# Patient Record
Sex: MALE | Race: WHITE | NOT HISPANIC OR LATINO | Employment: FULL TIME | URBAN - NONMETROPOLITAN AREA
[De-identification: names, ages, dates, MRNs, and addresses within clinical notes are randomized per-mention and may not be internally consistent; named-entity substitution may affect disease eponyms.]

---

## 2017-01-09 ENCOUNTER — APPOINTMENT (OUTPATIENT)
Dept: LAB | Facility: HOSPITAL | Age: 47
End: 2017-01-09
Payer: COMMERCIAL

## 2017-01-09 ENCOUNTER — TRANSCRIBE ORDERS (OUTPATIENT)
Dept: ADMINISTRATIVE | Facility: HOSPITAL | Age: 47
End: 2017-01-09

## 2017-01-09 DIAGNOSIS — Z79.899 DRUG THERAPY: Primary | ICD-10-CM

## 2017-01-09 DIAGNOSIS — Z79.899 DRUG THERAPY: ICD-10-CM

## 2017-01-09 LAB
ANION GAP SERPL CALCULATED.3IONS-SCNC: 13 MMOL/L (ref 4–13)
BASOPHILS # BLD AUTO: 0.02 THOUSANDS/ΜL (ref 0–0.1)
BASOPHILS NFR BLD AUTO: 0 % (ref 0–1)
BUN SERPL-MCNC: 28 MG/DL (ref 5–25)
CALCIUM SERPL-MCNC: 8.3 MG/DL (ref 8.3–10.1)
CHLORIDE SERPL-SCNC: 103 MMOL/L (ref 100–108)
CO2 SERPL-SCNC: 24 MMOL/L (ref 21–32)
CREAT SERPL-MCNC: 1.44 MG/DL (ref 0.6–1.3)
EOSINOPHIL # BLD AUTO: 0.27 THOUSAND/ΜL (ref 0–0.61)
EOSINOPHIL NFR BLD AUTO: 4 % (ref 0–6)
ERYTHROCYTE [DISTWIDTH] IN BLOOD BY AUTOMATED COUNT: 16.2 % (ref 11.6–15.1)
GFR SERPL CREATININE-BSD FRML MDRD: 52.6 ML/MIN/1.73SQ M
GLUCOSE SERPL-MCNC: 184 MG/DL (ref 65–140)
HCT VFR BLD AUTO: 31.9 % (ref 36.5–49.3)
HGB BLD-MCNC: 9.8 G/DL (ref 12–17)
LYMPHOCYTES # BLD AUTO: 1.5 THOUSANDS/ΜL (ref 0.6–4.47)
LYMPHOCYTES NFR BLD AUTO: 25 % (ref 14–44)
MCH RBC QN AUTO: 27.1 PG (ref 26.8–34.3)
MCHC RBC AUTO-ENTMCNC: 30.7 G/DL (ref 31.4–37.4)
MCV RBC AUTO: 88 FL (ref 82–98)
MONOCYTES # BLD AUTO: 0.82 THOUSAND/ΜL (ref 0.17–1.22)
MONOCYTES NFR BLD AUTO: 13 % (ref 4–12)
NEUTROPHILS # BLD AUTO: 3.52 THOUSANDS/ΜL (ref 1.85–7.62)
NEUTS SEG NFR BLD AUTO: 58 % (ref 43–75)
PLATELET # BLD AUTO: 362 THOUSANDS/UL (ref 149–390)
PMV BLD AUTO: 8.8 FL (ref 8.9–12.7)
POTASSIUM SERPL-SCNC: 3.9 MMOL/L (ref 3.5–5.3)
RBC # BLD AUTO: 3.62 MILLION/UL (ref 3.88–5.62)
SODIUM SERPL-SCNC: 140 MMOL/L (ref 136–145)
WBC # BLD AUTO: 6.13 THOUSAND/UL (ref 4.31–10.16)

## 2017-01-09 PROCEDURE — 80048 BASIC METABOLIC PNL TOTAL CA: CPT

## 2017-01-09 PROCEDURE — 80197 ASSAY OF TACROLIMUS: CPT

## 2017-01-09 PROCEDURE — 36415 COLL VENOUS BLD VENIPUNCTURE: CPT

## 2017-01-09 PROCEDURE — 85025 COMPLETE CBC W/AUTO DIFF WBC: CPT

## 2017-01-12 LAB — TACROLIMUS BLD LC/MS/MS-MCNC: 20.6 NG/ML (ref 2–20)

## 2017-03-21 ENCOUNTER — TRANSCRIBE ORDERS (OUTPATIENT)
Dept: ADMINISTRATIVE | Facility: HOSPITAL | Age: 47
End: 2017-03-21

## 2017-03-21 ENCOUNTER — APPOINTMENT (OUTPATIENT)
Dept: LAB | Facility: HOSPITAL | Age: 47
End: 2017-03-21
Payer: COMMERCIAL

## 2017-03-21 DIAGNOSIS — Z94.0 KIDNEY REPLACED BY TRANSPLANT: Primary | ICD-10-CM

## 2017-03-21 DIAGNOSIS — Z94.0 KIDNEY REPLACED BY TRANSPLANT: ICD-10-CM

## 2017-03-21 LAB
ALBUMIN SERPL BCP-MCNC: 3.4 G/DL (ref 3.5–5)
ALP SERPL-CCNC: 271 U/L (ref 46–116)
ALT SERPL W P-5'-P-CCNC: 29 U/L (ref 12–78)
ANION GAP SERPL CALCULATED.3IONS-SCNC: 16 MMOL/L (ref 4–13)
AST SERPL W P-5'-P-CCNC: 15 U/L (ref 5–45)
BILIRUB SERPL-MCNC: 0.2 MG/DL (ref 0.2–1)
BUN SERPL-MCNC: 38 MG/DL (ref 5–25)
CALCIUM SERPL-MCNC: 7.4 MG/DL (ref 8.3–10.1)
CHLORIDE SERPL-SCNC: 109 MMOL/L (ref 100–108)
CO2 SERPL-SCNC: 15 MMOL/L (ref 21–32)
CREAT SERPL-MCNC: 1.52 MG/DL (ref 0.6–1.3)
GFR SERPL CREATININE-BSD FRML MDRD: 49.4 ML/MIN/1.73SQ M
GLUCOSE P FAST SERPL-MCNC: 83 MG/DL (ref 65–99)
POTASSIUM SERPL-SCNC: 5.6 MMOL/L (ref 3.5–5.3)
PROT SERPL-MCNC: 6.8 G/DL (ref 6.4–8.2)
SODIUM SERPL-SCNC: 140 MMOL/L (ref 136–145)

## 2017-03-21 PROCEDURE — 80053 COMPREHEN METABOLIC PANEL: CPT

## 2017-03-21 PROCEDURE — 36415 COLL VENOUS BLD VENIPUNCTURE: CPT

## 2017-03-23 ENCOUNTER — GENERIC CONVERSION - ENCOUNTER (OUTPATIENT)
Dept: OTHER | Facility: OTHER | Age: 47
End: 2017-03-23

## 2017-06-27 ENCOUNTER — HOSPITAL ENCOUNTER (OUTPATIENT)
Dept: RADIOLOGY | Facility: HOSPITAL | Age: 47
Discharge: HOME | End: 2017-06-27
Attending: FAMILY MEDICINE
Payer: COMMERCIAL

## 2017-06-27 ENCOUNTER — HOSPITAL ENCOUNTER (EMERGENCY)
Facility: HOSPITAL | Age: 47
Discharge: HOME | End: 2017-06-27
Attending: EMERGENCY MEDICINE | Admitting: EMERGENCY MEDICINE
Payer: COMMERCIAL

## 2017-06-27 ENCOUNTER — TRANSCRIBE ORDERS (OUTPATIENT)
Dept: ADMINISTRATIVE | Facility: HOSPITAL | Age: 47
End: 2017-06-27

## 2017-06-27 VITALS
TEMPERATURE: 98.8 F | SYSTOLIC BLOOD PRESSURE: 161 MMHG | OXYGEN SATURATION: 100 % | RESPIRATION RATE: 18 BRPM | DIASTOLIC BLOOD PRESSURE: 72 MMHG | WEIGHT: 120 LBS | HEIGHT: 64 IN | HEART RATE: 95 BPM | BODY MASS INDEX: 20.49 KG/M2

## 2017-06-27 DIAGNOSIS — M79.671 PAIN OF RIGHT FOOT: ICD-10-CM

## 2017-06-27 DIAGNOSIS — S82.301A CLOSED FRACTURE OF RIGHT DISTAL TIBIA: Primary | ICD-10-CM

## 2017-06-27 DIAGNOSIS — M25.571 PAIN IN RIGHT ANKLE: ICD-10-CM

## 2017-06-27 DIAGNOSIS — S82.309A: ICD-10-CM

## 2017-06-27 DIAGNOSIS — S82.831A CLOSED FRACTURE OF RIGHT DISTAL FIBULA: ICD-10-CM

## 2017-06-27 DIAGNOSIS — I10 ESSENTIAL (PRIMARY) HYPERTENSION: ICD-10-CM

## 2017-06-27 DIAGNOSIS — E10.9 TYPE 1 DIABETES MELLITUS WITHOUT COMPLICATIONS (HCC): ICD-10-CM

## 2017-06-27 DIAGNOSIS — S92.001D: ICD-10-CM

## 2017-06-27 PROCEDURE — 73630 X-RAY EXAM OF FOOT: CPT

## 2017-06-27 PROCEDURE — 99283 EMERGENCY DEPT VISIT LOW MDM: CPT

## 2017-06-27 PROCEDURE — 73610 X-RAY EXAM OF ANKLE: CPT

## 2017-06-27 RX ORDER — ACETAMINOPHEN 325 MG/1
650 TABLET ORAL ONCE
Status: DISCONTINUED | OUTPATIENT
Start: 2017-06-27 | End: 2017-06-27 | Stop reason: HOSPADM

## 2017-06-28 ENCOUNTER — GENERIC CONVERSION - ENCOUNTER (OUTPATIENT)
Dept: OTHER | Facility: OTHER | Age: 47
End: 2017-06-28

## 2017-06-28 ENCOUNTER — ALLSCRIPTS OFFICE VISIT (OUTPATIENT)
Dept: OTHER | Facility: OTHER | Age: 47
End: 2017-06-28

## 2017-06-29 RX ORDER — ENALAPRIL MALEATE 5 MG/1
5 TABLET ORAL DAILY
Status: ON HOLD | COMMUNITY
End: 2017-07-03 | Stop reason: ALTCHOICE

## 2017-06-29 RX ORDER — ATENOLOL 25 MG/1
25 TABLET ORAL DAILY
COMMUNITY
End: 2017-09-25 | Stop reason: HOSPADM

## 2017-06-30 ENCOUNTER — APPOINTMENT (OUTPATIENT)
Dept: LAB | Facility: HOSPITAL | Age: 47
End: 2017-06-30
Payer: COMMERCIAL

## 2017-06-30 ENCOUNTER — TRANSCRIBE ORDERS (OUTPATIENT)
Dept: ADMINISTRATIVE | Facility: HOSPITAL | Age: 47
End: 2017-06-30

## 2017-06-30 ENCOUNTER — GENERIC CONVERSION - ENCOUNTER (OUTPATIENT)
Dept: OTHER | Facility: OTHER | Age: 47
End: 2017-06-30

## 2017-06-30 DIAGNOSIS — S82.309A: ICD-10-CM

## 2017-06-30 LAB
ANION GAP SERPL CALCULATED.3IONS-SCNC: 16 MMOL/L (ref 4–13)
APTT PPP: 53 SECONDS (ref 23–35)
BASOPHILS # BLD AUTO: 0.02 THOUSANDS/ΜL (ref 0–0.1)
BASOPHILS NFR BLD AUTO: 0 % (ref 0–1)
BUN SERPL-MCNC: 33 MG/DL (ref 5–25)
CALCIUM SERPL-MCNC: 7.7 MG/DL (ref 8.3–10.1)
CHLORIDE SERPL-SCNC: 114 MMOL/L (ref 100–108)
CO2 SERPL-SCNC: 13 MMOL/L (ref 21–32)
CREAT SERPL-MCNC: 1.94 MG/DL (ref 0.6–1.3)
EOSINOPHIL # BLD AUTO: 0.11 THOUSAND/ΜL (ref 0–0.61)
EOSINOPHIL NFR BLD AUTO: 1 % (ref 0–6)
ERYTHROCYTE [DISTWIDTH] IN BLOOD BY AUTOMATED COUNT: 16 % (ref 11.6–15.1)
EST. AVERAGE GLUCOSE BLD GHB EST-MCNC: 137 MG/DL
GFR SERPL CREATININE-BSD FRML MDRD: 37.3 ML/MIN/1.73SQ M
GLUCOSE SERPL-MCNC: 82 MG/DL (ref 65–140)
HBA1C MFR BLD: 6.4 % (ref 4.2–6.3)
HCT VFR BLD AUTO: 30.7 % (ref 36.5–49.3)
HGB BLD-MCNC: 9.5 G/DL (ref 12–17)
INR PPP: 1.6 (ref 0.86–1.16)
LYMPHOCYTES # BLD AUTO: 1.11 THOUSANDS/ΜL (ref 0.6–4.47)
LYMPHOCYTES NFR BLD AUTO: 14 % (ref 14–44)
MCH RBC QN AUTO: 26.8 PG (ref 26.8–34.3)
MCHC RBC AUTO-ENTMCNC: 30.9 G/DL (ref 31.4–37.4)
MCV RBC AUTO: 87 FL (ref 82–98)
MONOCYTES # BLD AUTO: 1.14 THOUSAND/ΜL (ref 0.17–1.22)
MONOCYTES NFR BLD AUTO: 14 % (ref 4–12)
NEUTROPHILS # BLD AUTO: 5.69 THOUSANDS/ΜL (ref 1.85–7.62)
NEUTS SEG NFR BLD AUTO: 71 % (ref 43–75)
PLATELET # BLD AUTO: 421 THOUSANDS/UL (ref 149–390)
PMV BLD AUTO: 9.1 FL (ref 8.9–12.7)
POTASSIUM SERPL-SCNC: 4.3 MMOL/L (ref 3.5–5.3)
PROTHROMBIN TIME: 19 SECONDS (ref 12.1–14.4)
RBC # BLD AUTO: 3.54 MILLION/UL (ref 3.88–5.62)
SODIUM SERPL-SCNC: 143 MMOL/L (ref 136–145)
WBC # BLD AUTO: 8.07 THOUSAND/UL (ref 4.31–10.16)

## 2017-06-30 PROCEDURE — 85610 PROTHROMBIN TIME: CPT

## 2017-06-30 PROCEDURE — 80048 BASIC METABOLIC PNL TOTAL CA: CPT

## 2017-06-30 PROCEDURE — 85730 THROMBOPLASTIN TIME PARTIAL: CPT

## 2017-06-30 PROCEDURE — 85025 COMPLETE CBC W/AUTO DIFF WBC: CPT

## 2017-06-30 PROCEDURE — 36415 COLL VENOUS BLD VENIPUNCTURE: CPT

## 2017-06-30 PROCEDURE — 83036 HEMOGLOBIN GLYCOSYLATED A1C: CPT

## 2017-07-02 ENCOUNTER — ANESTHESIA EVENT (OUTPATIENT)
Dept: PERIOP | Facility: HOSPITAL | Age: 47
End: 2017-07-02
Payer: COMMERCIAL

## 2017-07-03 ENCOUNTER — HOSPITAL ENCOUNTER (OUTPATIENT)
Facility: HOSPITAL | Age: 47
Setting detail: OUTPATIENT SURGERY
Discharge: HOME | End: 2017-07-03
Attending: ORTHOPAEDIC SURGERY | Admitting: ORTHOPAEDIC SURGERY
Payer: COMMERCIAL

## 2017-07-03 ENCOUNTER — APPOINTMENT (OUTPATIENT)
Dept: RADIOLOGY | Facility: HOSPITAL | Age: 47
End: 2017-07-03
Payer: COMMERCIAL

## 2017-07-03 ENCOUNTER — ANESTHESIA (OUTPATIENT)
Dept: PERIOP | Facility: HOSPITAL | Age: 47
End: 2017-07-03
Payer: COMMERCIAL

## 2017-07-03 VITALS
TEMPERATURE: 97.8 F | HEART RATE: 88 BPM | DIASTOLIC BLOOD PRESSURE: 62 MMHG | OXYGEN SATURATION: 96 % | RESPIRATION RATE: 18 BRPM | SYSTOLIC BLOOD PRESSURE: 128 MMHG

## 2017-07-03 DIAGNOSIS — T14.8XXA FRACTURE: ICD-10-CM

## 2017-07-03 PROBLEM — S82.301D CLOSED FRACTURE OF DISTAL END OF RIGHT TIBIA WITH ROUTINE HEALING: Status: ACTIVE | Noted: 2017-07-03

## 2017-07-03 LAB
GLUCOSE SERPL-MCNC: 229 MG/DL (ref 65–140)
INR PPP: 1.43 (ref 0.86–1.16)
PROTHROMBIN TIME: 17.4 SECONDS (ref 12.1–14.4)

## 2017-07-03 PROCEDURE — 73600 X-RAY EXAM OF ANKLE: CPT

## 2017-07-03 PROCEDURE — C1713 ANCHOR/SCREW BN/BN,TIS/BN: HCPCS | Performed by: ORTHOPAEDIC SURGERY

## 2017-07-03 PROCEDURE — 82948 REAGENT STRIP/BLOOD GLUCOSE: CPT

## 2017-07-03 PROCEDURE — 85610 PROTHROMBIN TIME: CPT | Performed by: NURSE ANESTHETIST, CERTIFIED REGISTERED

## 2017-07-03 PROCEDURE — C1769 GUIDE WIRE: HCPCS | Performed by: ORTHOPAEDIC SURGERY

## 2017-07-03 DEVICE — 7.3MM CANNULATED SCREW 32MM THREAD/55MM: Type: IMPLANTABLE DEVICE | Site: ANKLE | Status: FUNCTIONAL

## 2017-07-03 DEVICE — IMPLANTABLE DEVICE: Type: IMPLANTABLE DEVICE | Site: ANKLE | Status: FUNCTIONAL

## 2017-07-03 RX ORDER — GLYCOPYRROLATE 0.2 MG/ML
INJECTION INTRAMUSCULAR; INTRAVENOUS AS NEEDED
Status: DISCONTINUED | OUTPATIENT
Start: 2017-07-03 | End: 2017-07-03 | Stop reason: SURG

## 2017-07-03 RX ORDER — FENTANYL CITRATE 50 UG/ML
INJECTION, SOLUTION INTRAMUSCULAR; INTRAVENOUS AS NEEDED
Status: DISCONTINUED | OUTPATIENT
Start: 2017-07-03 | End: 2017-07-03 | Stop reason: SURG

## 2017-07-03 RX ORDER — LIDOCAINE HYDROCHLORIDE 10 MG/ML
INJECTION, SOLUTION INFILTRATION; PERINEURAL AS NEEDED
Status: DISCONTINUED | OUTPATIENT
Start: 2017-07-03 | End: 2017-07-03 | Stop reason: SURG

## 2017-07-03 RX ORDER — MIDAZOLAM HYDROCHLORIDE 1 MG/ML
INJECTION INTRAMUSCULAR; INTRAVENOUS AS NEEDED
Status: DISCONTINUED | OUTPATIENT
Start: 2017-07-03 | End: 2017-07-03 | Stop reason: SURG

## 2017-07-03 RX ORDER — MAGNESIUM HYDROXIDE 1200 MG/15ML
LIQUID ORAL AS NEEDED
Status: DISCONTINUED | OUTPATIENT
Start: 2017-07-03 | End: 2017-07-03 | Stop reason: HOSPADM

## 2017-07-03 RX ORDER — METOCLOPRAMIDE HYDROCHLORIDE 5 MG/ML
INJECTION INTRAMUSCULAR; INTRAVENOUS AS NEEDED
Status: DISCONTINUED | OUTPATIENT
Start: 2017-07-03 | End: 2017-07-03 | Stop reason: SURG

## 2017-07-03 RX ORDER — FENTANYL CITRATE/PF 50 MCG/ML
25 SYRINGE (ML) INJECTION
Status: DISCONTINUED | OUTPATIENT
Start: 2017-07-03 | End: 2017-07-03 | Stop reason: HOSPADM

## 2017-07-03 RX ORDER — ACETAMINOPHEN AND CODEINE PHOSPHATE 300; 15 MG/1; MG/1
1 TABLET ORAL EVERY 4 HOURS PRN
Qty: 30 TABLET | Refills: 0 | Status: SHIPPED | OUTPATIENT
Start: 2017-07-03 | End: 2017-07-14

## 2017-07-03 RX ORDER — BUPIVACAINE HYDROCHLORIDE AND EPINEPHRINE 5; 5 MG/ML; UG/ML
INJECTION, SOLUTION PERINEURAL AS NEEDED
Status: DISCONTINUED | OUTPATIENT
Start: 2017-07-03 | End: 2017-07-03 | Stop reason: HOSPADM

## 2017-07-03 RX ORDER — ONDANSETRON 2 MG/ML
INJECTION INTRAMUSCULAR; INTRAVENOUS AS NEEDED
Status: DISCONTINUED | OUTPATIENT
Start: 2017-07-03 | End: 2017-07-03 | Stop reason: SURG

## 2017-07-03 RX ORDER — PROPOFOL 10 MG/ML
INJECTION, EMULSION INTRAVENOUS AS NEEDED
Status: DISCONTINUED | OUTPATIENT
Start: 2017-07-03 | End: 2017-07-03 | Stop reason: SURG

## 2017-07-03 RX ORDER — OXYCODONE HYDROCHLORIDE AND ACETAMINOPHEN 5; 325 MG/1; MG/1
1 TABLET ORAL EVERY 4 HOURS PRN
Status: DISCONTINUED | OUTPATIENT
Start: 2017-07-03 | End: 2017-07-03 | Stop reason: HOSPADM

## 2017-07-03 RX ORDER — EPHEDRINE SULFATE 50 MG/ML
INJECTION, SOLUTION INTRAVENOUS AS NEEDED
Status: DISCONTINUED | OUTPATIENT
Start: 2017-07-03 | End: 2017-07-03 | Stop reason: SURG

## 2017-07-03 RX ORDER — SODIUM CHLORIDE 9 MG/ML
100 INJECTION, SOLUTION INTRAVENOUS CONTINUOUS
Status: DISCONTINUED | OUTPATIENT
Start: 2017-07-03 | End: 2017-07-03 | Stop reason: HOSPADM

## 2017-07-03 RX ADMIN — PROPOFOL 150 MG: 10 INJECTION, EMULSION INTRAVENOUS at 07:38

## 2017-07-03 RX ADMIN — CEFAZOLIN SODIUM 1000 MG: 1 SOLUTION INTRAVENOUS at 07:35

## 2017-07-03 RX ADMIN — METOCLOPRAMIDE HYDROCHLORIDE 10 MG: 5 INJECTION INTRAMUSCULAR; INTRAVENOUS at 07:33

## 2017-07-03 RX ADMIN — HYDROCORTISONE SODIUM SUCCINATE 100 MG: 100 INJECTION, POWDER, FOR SOLUTION INTRAMUSCULAR; INTRAVENOUS at 07:39

## 2017-07-03 RX ADMIN — MIDAZOLAM HYDROCHLORIDE 2 MG: 1 INJECTION, SOLUTION INTRAMUSCULAR; INTRAVENOUS at 07:30

## 2017-07-03 RX ADMIN — GLYCOPYRROLATE 0.2 MG: 0.2 INJECTION, SOLUTION INTRAMUSCULAR; INTRAVENOUS at 07:35

## 2017-07-03 RX ADMIN — INSULIN HUMAN 5 UNITS: 100 INJECTION, SOLUTION PARENTERAL at 09:25

## 2017-07-03 RX ADMIN — FENTANYL CITRATE 25 MCG: 50 INJECTION, SOLUTION INTRAMUSCULAR; INTRAVENOUS at 07:42

## 2017-07-03 RX ADMIN — EPHEDRINE SULFATE 10 MG: 50 INJECTION, SOLUTION INTRAMUSCULAR; INTRAVENOUS; SUBCUTANEOUS at 07:58

## 2017-07-03 RX ADMIN — FENTANYL CITRATE 25 MCG: 50 INJECTION, SOLUTION INTRAMUSCULAR; INTRAVENOUS at 08:47

## 2017-07-03 RX ADMIN — FENTANYL CITRATE 25 MCG: 50 INJECTION, SOLUTION INTRAMUSCULAR; INTRAVENOUS at 08:02

## 2017-07-03 RX ADMIN — FENTANYL CITRATE 25 MCG: 50 INJECTION, SOLUTION INTRAMUSCULAR; INTRAVENOUS at 08:25

## 2017-07-03 RX ADMIN — SODIUM CHLORIDE 100 ML/HR: 0.9 INJECTION, SOLUTION INTRAVENOUS at 07:22

## 2017-07-03 RX ADMIN — ONDANSETRON HYDROCHLORIDE 4 MG: 2 INJECTION, SOLUTION INTRAVENOUS at 07:44

## 2017-07-03 RX ADMIN — LIDOCAINE HYDROCHLORIDE 30 MG: 10 INJECTION, SOLUTION INFILTRATION; PERINEURAL at 07:38

## 2017-07-10 ENCOUNTER — GENERIC CONVERSION - ENCOUNTER (OUTPATIENT)
Dept: OTHER | Facility: OTHER | Age: 47
End: 2017-07-10

## 2017-07-12 ENCOUNTER — APPOINTMENT (OUTPATIENT)
Dept: RADIOLOGY | Facility: MEDICAL CENTER | Age: 47
End: 2017-07-12
Payer: COMMERCIAL

## 2017-07-12 ENCOUNTER — ALLSCRIPTS OFFICE VISIT (OUTPATIENT)
Dept: OTHER | Facility: OTHER | Age: 47
End: 2017-07-12

## 2017-07-12 DIAGNOSIS — M25.571 PAIN IN RIGHT ANKLE: ICD-10-CM

## 2017-07-12 PROCEDURE — 73610 X-RAY EXAM OF ANKLE: CPT

## 2017-07-12 PROCEDURE — 73600 X-RAY EXAM OF ANKLE: CPT

## 2017-07-14 ENCOUNTER — HOSPITAL ENCOUNTER (EMERGENCY)
Facility: HOSPITAL | Age: 47
Discharge: HOME | End: 2017-07-14
Attending: EMERGENCY MEDICINE
Payer: COMMERCIAL

## 2017-07-14 ENCOUNTER — APPOINTMENT (EMERGENCY)
Dept: RADIOLOGY | Facility: HOSPITAL | Age: 47
End: 2017-07-14
Payer: COMMERCIAL

## 2017-07-14 VITALS
WEIGHT: 103.2 LBS | HEIGHT: 64 IN | SYSTOLIC BLOOD PRESSURE: 164 MMHG | TEMPERATURE: 98.2 F | HEART RATE: 79 BPM | RESPIRATION RATE: 18 BRPM | OXYGEN SATURATION: 100 % | BODY MASS INDEX: 17.62 KG/M2 | DIASTOLIC BLOOD PRESSURE: 79 MMHG

## 2017-07-14 DIAGNOSIS — S82.301D: ICD-10-CM

## 2017-07-14 DIAGNOSIS — M79.673 FOOT PAIN: Primary | ICD-10-CM

## 2017-07-14 PROCEDURE — 73630 X-RAY EXAM OF FOOT: CPT

## 2017-07-14 PROCEDURE — 99283 EMERGENCY DEPT VISIT LOW MDM: CPT

## 2017-07-17 ENCOUNTER — ALLSCRIPTS OFFICE VISIT (OUTPATIENT)
Dept: OTHER | Facility: OTHER | Age: 47
End: 2017-07-17

## 2017-07-21 ENCOUNTER — APPOINTMENT (OUTPATIENT)
Dept: RADIOLOGY | Facility: MEDICAL CENTER | Age: 47
End: 2017-07-21
Payer: COMMERCIAL

## 2017-07-21 ENCOUNTER — ALLSCRIPTS OFFICE VISIT (OUTPATIENT)
Dept: OTHER | Facility: OTHER | Age: 47
End: 2017-07-21

## 2017-07-21 DIAGNOSIS — S92.001D: ICD-10-CM

## 2017-07-21 PROCEDURE — 73610 X-RAY EXAM OF ANKLE: CPT

## 2017-07-31 ENCOUNTER — APPOINTMENT (INPATIENT)
Dept: RADIOLOGY | Facility: HOSPITAL | Age: 47
DRG: 496 | End: 2017-07-31
Payer: COMMERCIAL

## 2017-07-31 ENCOUNTER — ANESTHESIA (INPATIENT)
Dept: PERIOP | Facility: HOSPITAL | Age: 47
DRG: 496 | End: 2017-07-31
Payer: COMMERCIAL

## 2017-07-31 ENCOUNTER — HOSPITAL ENCOUNTER (INPATIENT)
Facility: HOSPITAL | Age: 47
LOS: 1 days | Discharge: HOME | DRG: 496 | End: 2017-08-01
Attending: EMERGENCY MEDICINE | Admitting: ORTHOPAEDIC SURGERY
Payer: COMMERCIAL

## 2017-07-31 ENCOUNTER — ANESTHESIA EVENT (INPATIENT)
Dept: PERIOP | Facility: HOSPITAL | Age: 47
DRG: 496 | End: 2017-07-31
Payer: COMMERCIAL

## 2017-07-31 ENCOUNTER — APPOINTMENT (EMERGENCY)
Dept: RADIOLOGY | Facility: HOSPITAL | Age: 47
DRG: 496 | End: 2017-07-31
Payer: COMMERCIAL

## 2017-07-31 DIAGNOSIS — Z96.7 FIXATION HARDWARE IN LOWER EXTREMITY: Primary | ICD-10-CM

## 2017-07-31 DIAGNOSIS — M86.8X6: ICD-10-CM

## 2017-07-31 DIAGNOSIS — T14.8XXA WOUND INFECTION: ICD-10-CM

## 2017-07-31 DIAGNOSIS — E10.22 TYPE 1 DIABETES MELLITUS WITH DIABETIC CHRONIC KIDNEY DISEASE, UNSPECIFIED CKD STAGE (HCC): ICD-10-CM

## 2017-07-31 DIAGNOSIS — N17.9 AKI (ACUTE KIDNEY INJURY) (HCC): ICD-10-CM

## 2017-07-31 DIAGNOSIS — L08.9 WOUND INFECTION: ICD-10-CM

## 2017-07-31 PROBLEM — R79.89 ELEVATED PLATELET COUNT: Status: ACTIVE | Noted: 2017-07-31

## 2017-07-31 LAB
ALBUMIN SERPL BCP-MCNC: 2.7 G/DL (ref 3.5–5)
ALP SERPL-CCNC: 244 U/L (ref 46–116)
ALT SERPL W P-5'-P-CCNC: 23 U/L (ref 12–78)
ANION GAP SERPL CALCULATED.3IONS-SCNC: 15 MMOL/L (ref 4–13)
ANION GAP SERPL CALCULATED.3IONS-SCNC: 15 MMOL/L (ref 4–13)
AST SERPL W P-5'-P-CCNC: 21 U/L (ref 5–45)
BASOPHILS # BLD AUTO: 0.01 THOUSANDS/ΜL (ref 0–0.1)
BASOPHILS NFR BLD AUTO: 0 % (ref 0–1)
BILIRUB SERPL-MCNC: 0.3 MG/DL (ref 0.2–1)
BUN SERPL-MCNC: 31 MG/DL (ref 5–25)
BUN SERPL-MCNC: 32 MG/DL (ref 5–25)
CALCIUM SERPL-MCNC: 7.4 MG/DL (ref 8.3–10.1)
CALCIUM SERPL-MCNC: 7.6 MG/DL (ref 8.3–10.1)
CHLORIDE SERPL-SCNC: 110 MMOL/L (ref 100–108)
CHLORIDE SERPL-SCNC: 110 MMOL/L (ref 100–108)
CO2 SERPL-SCNC: 12 MMOL/L (ref 21–32)
CO2 SERPL-SCNC: 12 MMOL/L (ref 21–32)
CREAT SERPL-MCNC: 1.89 MG/DL (ref 0.6–1.3)
CREAT SERPL-MCNC: 2.03 MG/DL (ref 0.6–1.3)
EOSINOPHIL # BLD AUTO: 0.05 THOUSAND/ΜL (ref 0–0.61)
EOSINOPHIL NFR BLD AUTO: 1 % (ref 0–6)
ERYTHROCYTE [DISTWIDTH] IN BLOOD BY AUTOMATED COUNT: 16.3 % (ref 11.6–15.1)
GFR SERPL CREATININE-BSD FRML MDRD: 38 ML/MIN/1.73SQ M
GFR SERPL CREATININE-BSD FRML MDRD: 41 ML/MIN/1.73SQ M
GLUCOSE SERPL-MCNC: 211 MG/DL (ref 65–140)
GLUCOSE SERPL-MCNC: 52 MG/DL (ref 65–140)
GLUCOSE SERPL-MCNC: 78 MG/DL (ref 65–140)
GLUCOSE SERPL-MCNC: 83 MG/DL (ref 65–140)
GLUCOSE SERPL-MCNC: 94 MG/DL (ref 65–140)
HCT VFR BLD AUTO: 29.8 % (ref 36.5–49.3)
HGB BLD-MCNC: 9.3 G/DL (ref 12–17)
LYMPHOCYTES # BLD AUTO: 0.86 THOUSANDS/ΜL (ref 0.6–4.47)
LYMPHOCYTES NFR BLD AUTO: 11 % (ref 14–44)
MCH RBC QN AUTO: 26.1 PG (ref 26.8–34.3)
MCHC RBC AUTO-ENTMCNC: 31.2 G/DL (ref 31.4–37.4)
MCV RBC AUTO: 84 FL (ref 82–98)
MONOCYTES # BLD AUTO: 0.88 THOUSAND/ΜL (ref 0.17–1.22)
MONOCYTES NFR BLD AUTO: 12 % (ref 4–12)
NEUTROPHILS # BLD AUTO: 5.75 THOUSANDS/ΜL (ref 1.85–7.62)
NEUTS SEG NFR BLD AUTO: 76 % (ref 43–75)
PLATELET # BLD AUTO: 400 THOUSANDS/UL (ref 149–390)
PMV BLD AUTO: 9 FL (ref 8.9–12.7)
POTASSIUM SERPL-SCNC: 4.2 MMOL/L (ref 3.5–5.3)
POTASSIUM SERPL-SCNC: 4.5 MMOL/L (ref 3.5–5.3)
PROT SERPL-MCNC: 6.9 G/DL (ref 6.4–8.2)
RBC # BLD AUTO: 3.56 MILLION/UL (ref 3.88–5.62)
SODIUM SERPL-SCNC: 137 MMOL/L (ref 136–145)
SODIUM SERPL-SCNC: 137 MMOL/L (ref 136–145)
WBC # BLD AUTO: 7.55 THOUSAND/UL (ref 4.31–10.16)

## 2017-07-31 PROCEDURE — 0QPG04Z REMOVAL OF INTERNAL FIXATION DEVICE FROM RIGHT TIBIA, OPEN APPROACH: ICD-10-PCS | Performed by: ORTHOPAEDIC SURGERY

## 2017-07-31 PROCEDURE — 87070 CULTURE OTHR SPECIMN AEROBIC: CPT | Performed by: EMERGENCY MEDICINE

## 2017-07-31 PROCEDURE — 80048 BASIC METABOLIC PNL TOTAL CA: CPT | Performed by: INTERNAL MEDICINE

## 2017-07-31 PROCEDURE — 82948 REAGENT STRIP/BLOOD GLUCOSE: CPT

## 2017-07-31 PROCEDURE — 99284 EMERGENCY DEPT VISIT MOD MDM: CPT

## 2017-07-31 PROCEDURE — 80053 COMPREHEN METABOLIC PANEL: CPT | Performed by: EMERGENCY MEDICINE

## 2017-07-31 PROCEDURE — 87205 SMEAR GRAM STAIN: CPT | Performed by: EMERGENCY MEDICINE

## 2017-07-31 PROCEDURE — 85025 COMPLETE CBC W/AUTO DIFF WBC: CPT | Performed by: EMERGENCY MEDICINE

## 2017-07-31 PROCEDURE — 73600 X-RAY EXAM OF ANKLE: CPT

## 2017-07-31 PROCEDURE — 96365 THER/PROPH/DIAG IV INF INIT: CPT

## 2017-07-31 PROCEDURE — 87075 CULTR BACTERIA EXCEPT BLOOD: CPT | Performed by: ORTHOPAEDIC SURGERY

## 2017-07-31 PROCEDURE — 87040 BLOOD CULTURE FOR BACTERIA: CPT | Performed by: EMERGENCY MEDICINE

## 2017-07-31 PROCEDURE — 36415 COLL VENOUS BLD VENIPUNCTURE: CPT | Performed by: EMERGENCY MEDICINE

## 2017-07-31 PROCEDURE — 87070 CULTURE OTHR SPECIMN AEROBIC: CPT | Performed by: ORTHOPAEDIC SURGERY

## 2017-07-31 PROCEDURE — 87205 SMEAR GRAM STAIN: CPT | Performed by: ORTHOPAEDIC SURGERY

## 2017-07-31 PROCEDURE — 73610 X-RAY EXAM OF ANKLE: CPT

## 2017-07-31 RX ORDER — ONDANSETRON 2 MG/ML
4 INJECTION INTRAMUSCULAR; INTRAVENOUS EVERY 6 HOURS PRN
Status: DISCONTINUED | OUTPATIENT
Start: 2017-07-31 | End: 2017-08-01 | Stop reason: HOSPADM

## 2017-07-31 RX ORDER — GINSENG 100 MG
CAPSULE ORAL AS NEEDED
Status: DISCONTINUED | OUTPATIENT
Start: 2017-07-31 | End: 2017-07-31 | Stop reason: HOSPADM

## 2017-07-31 RX ORDER — SODIUM CHLORIDE, SODIUM LACTATE, POTASSIUM CHLORIDE, CALCIUM CHLORIDE 600; 310; 30; 20 MG/100ML; MG/100ML; MG/100ML; MG/100ML
125 INJECTION, SOLUTION INTRAVENOUS CONTINUOUS
Status: DISCONTINUED | OUTPATIENT
Start: 2017-07-31 | End: 2017-07-31

## 2017-07-31 RX ORDER — ONDANSETRON 2 MG/ML
4 INJECTION INTRAMUSCULAR; INTRAVENOUS ONCE AS NEEDED
Status: DISCONTINUED | OUTPATIENT
Start: 2017-07-31 | End: 2017-07-31 | Stop reason: HOSPADM

## 2017-07-31 RX ORDER — METOCLOPRAMIDE HYDROCHLORIDE 5 MG/ML
INJECTION INTRAMUSCULAR; INTRAVENOUS AS NEEDED
Status: DISCONTINUED | OUTPATIENT
Start: 2017-07-31 | End: 2017-07-31 | Stop reason: SURG

## 2017-07-31 RX ORDER — MIDAZOLAM HYDROCHLORIDE 1 MG/ML
INJECTION INTRAMUSCULAR; INTRAVENOUS AS NEEDED
Status: DISCONTINUED | OUTPATIENT
Start: 2017-07-31 | End: 2017-07-31 | Stop reason: SURG

## 2017-07-31 RX ORDER — SODIUM CHLORIDE 9 MG/ML
75 INJECTION, SOLUTION INTRAVENOUS CONTINUOUS
Status: DISCONTINUED | OUTPATIENT
Start: 2017-07-31 | End: 2017-08-01

## 2017-07-31 RX ORDER — ATENOLOL 25 MG/1
25 TABLET ORAL DAILY
Status: DISCONTINUED | OUTPATIENT
Start: 2017-08-01 | End: 2017-08-01 | Stop reason: HOSPADM

## 2017-07-31 RX ORDER — SODIUM CHLORIDE 9 MG/ML
INJECTION, SOLUTION INTRAVENOUS CONTINUOUS PRN
Status: DISCONTINUED | OUTPATIENT
Start: 2017-07-31 | End: 2017-07-31 | Stop reason: SURG

## 2017-07-31 RX ORDER — ACETAMINOPHEN 325 MG/1
650 TABLET ORAL EVERY 6 HOURS PRN
Status: DISCONTINUED | OUTPATIENT
Start: 2017-07-31 | End: 2017-08-01 | Stop reason: HOSPADM

## 2017-07-31 RX ORDER — TACROLIMUS 0.5 MG/1
0.5 CAPSULE ORAL 2 TIMES DAILY
Status: DISCONTINUED | OUTPATIENT
Start: 2017-07-31 | End: 2017-08-01 | Stop reason: HOSPADM

## 2017-07-31 RX ORDER — PROPOFOL 10 MG/ML
INJECTION, EMULSION INTRAVENOUS AS NEEDED
Status: DISCONTINUED | OUTPATIENT
Start: 2017-07-31 | End: 2017-07-31 | Stop reason: SURG

## 2017-07-31 RX ORDER — FENTANYL CITRATE 50 UG/ML
INJECTION, SOLUTION INTRAMUSCULAR; INTRAVENOUS AS NEEDED
Status: DISCONTINUED | OUTPATIENT
Start: 2017-07-31 | End: 2017-07-31 | Stop reason: SURG

## 2017-07-31 RX ORDER — PREDNISONE 1 MG/1
5 TABLET ORAL DAILY
Status: DISCONTINUED | OUTPATIENT
Start: 2017-08-01 | End: 2017-08-01

## 2017-07-31 RX ORDER — MAGNESIUM HYDROXIDE/ALUMINUM HYDROXICE/SIMETHICONE 120; 1200; 1200 MG/30ML; MG/30ML; MG/30ML
30 SUSPENSION ORAL EVERY 6 HOURS PRN
Status: DISCONTINUED | OUTPATIENT
Start: 2017-07-31 | End: 2017-08-01 | Stop reason: HOSPADM

## 2017-07-31 RX ORDER — DEXTROSE MONOHYDRATE 50 MG/ML
INJECTION, SOLUTION INTRAVENOUS CONTINUOUS PRN
Status: DISCONTINUED | OUTPATIENT
Start: 2017-07-31 | End: 2017-07-31 | Stop reason: SURG

## 2017-07-31 RX ORDER — OXYCODONE HYDROCHLORIDE 5 MG/1
5 TABLET ORAL EVERY 4 HOURS PRN
Status: DISCONTINUED | OUTPATIENT
Start: 2017-07-31 | End: 2017-08-01 | Stop reason: HOSPADM

## 2017-07-31 RX ORDER — LIDOCAINE HYDROCHLORIDE 10 MG/ML
INJECTION, SOLUTION INFILTRATION; PERINEURAL AS NEEDED
Status: DISCONTINUED | OUTPATIENT
Start: 2017-07-31 | End: 2017-07-31 | Stop reason: SURG

## 2017-07-31 RX ORDER — HEPARIN SODIUM 5000 [USP'U]/ML
5000 INJECTION, SOLUTION INTRAVENOUS; SUBCUTANEOUS EVERY 8 HOURS SCHEDULED
Status: DISCONTINUED | OUTPATIENT
Start: 2017-07-31 | End: 2017-08-01 | Stop reason: HOSPADM

## 2017-07-31 RX ORDER — FENTANYL CITRATE/PF 50 MCG/ML
25 SYRINGE (ML) INJECTION
Status: DISCONTINUED | OUTPATIENT
Start: 2017-07-31 | End: 2017-07-31 | Stop reason: HOSPADM

## 2017-07-31 RX ORDER — MAGNESIUM HYDROXIDE 1200 MG/15ML
LIQUID ORAL AS NEEDED
Status: DISCONTINUED | OUTPATIENT
Start: 2017-07-31 | End: 2017-07-31 | Stop reason: HOSPADM

## 2017-07-31 RX ORDER — CLINDAMYCIN HYDROCHLORIDE 300 MG/1
300 CAPSULE ORAL 3 TIMES DAILY
Qty: 30 CAPSULE | Refills: 0 | Status: SHIPPED | OUTPATIENT
Start: 2017-07-31 | End: 2017-08-10

## 2017-07-31 RX ORDER — DOCUSATE SODIUM 100 MG/1
100 CAPSULE, LIQUID FILLED ORAL 2 TIMES DAILY
Status: DISCONTINUED | OUTPATIENT
Start: 2017-07-31 | End: 2017-08-01 | Stop reason: HOSPADM

## 2017-07-31 RX ORDER — EPHEDRINE SULFATE 50 MG/ML
INJECTION, SOLUTION INTRAVENOUS AS NEEDED
Status: DISCONTINUED | OUTPATIENT
Start: 2017-07-31 | End: 2017-07-31 | Stop reason: SURG

## 2017-07-31 RX ORDER — OXYCODONE HYDROCHLORIDE AND ACETAMINOPHEN 5; 325 MG/1; MG/1
1 TABLET ORAL EVERY 4 HOURS PRN
Qty: 30 TABLET | Refills: 0 | Status: SHIPPED | OUTPATIENT
Start: 2017-07-31 | End: 2017-09-25 | Stop reason: HOSPADM

## 2017-07-31 RX ORDER — OXYCODONE HYDROCHLORIDE AND ACETAMINOPHEN 5; 325 MG/1; MG/1
2 TABLET ORAL EVERY 4 HOURS PRN
Status: DISCONTINUED | OUTPATIENT
Start: 2017-07-31 | End: 2017-07-31

## 2017-07-31 RX ORDER — PANTOPRAZOLE SODIUM 40 MG/1
40 TABLET, DELAYED RELEASE ORAL DAILY
Status: DISCONTINUED | OUTPATIENT
Start: 2017-08-01 | End: 2017-07-31

## 2017-07-31 RX ORDER — MYCOPHENOLATE MOFETIL 250 MG/1
750 CAPSULE ORAL 2 TIMES DAILY
Status: DISCONTINUED | OUTPATIENT
Start: 2017-07-31 | End: 2017-08-01 | Stop reason: HOSPADM

## 2017-07-31 RX ORDER — ACETAMINOPHEN 325 MG/1
650 TABLET ORAL EVERY 6 HOURS PRN
Status: DISCONTINUED | OUTPATIENT
Start: 2017-07-31 | End: 2017-07-31

## 2017-07-31 RX ORDER — ONDANSETRON 2 MG/ML
INJECTION INTRAMUSCULAR; INTRAVENOUS AS NEEDED
Status: DISCONTINUED | OUTPATIENT
Start: 2017-07-31 | End: 2017-07-31 | Stop reason: SURG

## 2017-07-31 RX ORDER — SUCCINYLCHOLINE CHLORIDE 20 MG/ML
INJECTION INTRAMUSCULAR; INTRAVENOUS AS NEEDED
Status: DISCONTINUED | OUTPATIENT
Start: 2017-07-31 | End: 2017-07-31 | Stop reason: SURG

## 2017-07-31 RX ORDER — GLYCOPYRROLATE 0.2 MG/ML
INJECTION INTRAMUSCULAR; INTRAVENOUS AS NEEDED
Status: DISCONTINUED | OUTPATIENT
Start: 2017-07-31 | End: 2017-07-31 | Stop reason: SURG

## 2017-07-31 RX ADMIN — MYCOPHENOLATE MOFETIL 750 MG: 250 CAPSULE ORAL at 21:30

## 2017-07-31 RX ADMIN — INSULIN LISPRO 1 UNITS: 100 INJECTION, SOLUTION INTRAVENOUS; SUBCUTANEOUS at 21:32

## 2017-07-31 RX ADMIN — SODIUM CHLORIDE 50 ML/HR: 0.9 INJECTION, SOLUTION INTRAVENOUS at 18:41

## 2017-07-31 RX ADMIN — TACROLIMUS 0.5 MG: 0.5 CAPSULE ORAL at 21:30

## 2017-07-31 RX ADMIN — LIDOCAINE HYDROCHLORIDE 50 MG: 10 INJECTION, SOLUTION INFILTRATION; PERINEURAL at 15:01

## 2017-07-31 RX ADMIN — INSULIN DETEMIR 10 UNITS: 100 INJECTION, SOLUTION SUBCUTANEOUS at 21:32

## 2017-07-31 RX ADMIN — SODIUM CHLORIDE: 0.9 INJECTION, SOLUTION INTRAVENOUS at 14:45

## 2017-07-31 RX ADMIN — HEPARIN SODIUM 5000 UNITS: 5000 INJECTION, SOLUTION INTRAVENOUS; SUBCUTANEOUS at 21:29

## 2017-07-31 RX ADMIN — VANCOMYCIN HYDROCHLORIDE 1 G: 1 INJECTION, POWDER, LYOPHILIZED, FOR SOLUTION INTRAVENOUS at 14:50

## 2017-07-31 RX ADMIN — SODIUM CHLORIDE, SODIUM LACTATE, POTASSIUM CHLORIDE, AND CALCIUM CHLORIDE 125 ML/HR: .6; .31; .03; .02 INJECTION, SOLUTION INTRAVENOUS at 16:42

## 2017-07-31 RX ADMIN — CEFAZOLIN SODIUM 2000 MG: 2 SOLUTION INTRAVENOUS at 12:22

## 2017-07-31 RX ADMIN — ONDANSETRON HYDROCHLORIDE 4 MG: 2 INJECTION, SOLUTION INTRAVENOUS at 15:01

## 2017-07-31 RX ADMIN — DOCUSATE SODIUM 100 MG: 100 CAPSULE, LIQUID FILLED ORAL at 17:05

## 2017-07-31 RX ADMIN — DEXTROSE: 5 SOLUTION INTRAVENOUS at 14:50

## 2017-07-31 RX ADMIN — METOCLOPRAMIDE HYDROCHLORIDE 10 MG: 5 INJECTION INTRAMUSCULAR; INTRAVENOUS at 15:01

## 2017-07-31 RX ADMIN — FENTANYL CITRATE 50 MCG: 50 INJECTION, SOLUTION INTRAMUSCULAR; INTRAVENOUS at 15:10

## 2017-07-31 RX ADMIN — MIDAZOLAM HYDROCHLORIDE 2 MG: 1 INJECTION, SOLUTION INTRAMUSCULAR; INTRAVENOUS at 14:51

## 2017-07-31 RX ADMIN — GLYCOPYRROLATE 0.1 MG: 0.2 INJECTION, SOLUTION INTRAMUSCULAR; INTRAVENOUS at 15:09

## 2017-07-31 RX ADMIN — EPHEDRINE SULFATE 10 MG: 50 INJECTION, SOLUTION INTRAMUSCULAR; INTRAVENOUS; SUBCUTANEOUS at 15:15

## 2017-07-31 RX ADMIN — PROPOFOL 150 MG: 10 INJECTION, EMULSION INTRAVENOUS at 15:01

## 2017-07-31 RX ADMIN — SUCCINYLCHOLINE CHLORIDE 100 MG: 20 INJECTION, SOLUTION INTRAMUSCULAR; INTRAVENOUS at 15:01

## 2017-08-01 VITALS
HEIGHT: 64 IN | WEIGHT: 108.25 LBS | RESPIRATION RATE: 18 BRPM | TEMPERATURE: 98.9 F | SYSTOLIC BLOOD PRESSURE: 98 MMHG | HEART RATE: 77 BPM | DIASTOLIC BLOOD PRESSURE: 56 MMHG | BODY MASS INDEX: 18.48 KG/M2 | OXYGEN SATURATION: 97 %

## 2017-08-01 LAB
25(OH)D3 SERPL-MCNC: 7.8 NG/ML (ref 30–100)
ALBUMIN SERPL BCP-MCNC: 2.4 G/DL (ref 3.5–5)
ALP SERPL-CCNC: 229 U/L (ref 46–116)
ALT SERPL W P-5'-P-CCNC: 18 U/L (ref 12–78)
ANION GAP SERPL CALCULATED.3IONS-SCNC: 15 MMOL/L (ref 4–13)
ARTERIAL PATENCY WRIST A: YES
AST SERPL W P-5'-P-CCNC: 15 U/L (ref 5–45)
BASE EXCESS BLDA CALC-SCNC: -17.3 MMOL/L
BASOPHILS # BLD AUTO: 0 THOUSANDS/ΜL (ref 0–0.1)
BASOPHILS NFR BLD AUTO: 0 % (ref 0–1)
BILIRUB SERPL-MCNC: 0.2 MG/DL (ref 0.2–1)
BUN SERPL-MCNC: 31 MG/DL (ref 5–25)
CA-I BLD-SCNC: 1.15 MMOL/L (ref 1.12–1.32)
CALCIUM SERPL-MCNC: 7.2 MG/DL (ref 8.3–10.1)
CHLORIDE SERPL-SCNC: 110 MMOL/L (ref 100–108)
CO2 SERPL-SCNC: 11 MMOL/L (ref 21–32)
CREAT SERPL-MCNC: 2.03 MG/DL (ref 0.6–1.3)
EOSINOPHIL # BLD AUTO: 0.05 THOUSAND/ΜL (ref 0–0.61)
EOSINOPHIL NFR BLD AUTO: 1 % (ref 0–6)
ERYTHROCYTE [DISTWIDTH] IN BLOOD BY AUTOMATED COUNT: 16.5 % (ref 11.6–15.1)
GFR SERPL CREATININE-BSD FRML MDRD: 38 ML/MIN/1.73SQ M
GLUCOSE SERPL-MCNC: 133 MG/DL (ref 65–140)
GLUCOSE SERPL-MCNC: 156 MG/DL (ref 65–140)
GLUCOSE SERPL-MCNC: 84 MG/DL (ref 65–140)
HCO3 BLDA-SCNC: 9 MMOL/L (ref 22–28)
HCT VFR BLD AUTO: 29.1 % (ref 36.5–49.3)
HGB BLD-MCNC: 9.1 G/DL (ref 12–17)
INR PPP: 1.49 (ref 0.86–1.16)
LACTATE SERPL-SCNC: 0.7 MMOL/L (ref 0.5–2)
LYMPHOCYTES # BLD AUTO: 1.06 THOUSANDS/ΜL (ref 0.6–4.47)
LYMPHOCYTES NFR BLD AUTO: 15 % (ref 14–44)
MAGNESIUM SERPL-MCNC: 1.2 MG/DL (ref 1.6–2.6)
MCH RBC QN AUTO: 26.4 PG (ref 26.8–34.3)
MCHC RBC AUTO-ENTMCNC: 31.3 G/DL (ref 31.4–37.4)
MCV RBC AUTO: 84 FL (ref 82–98)
MONOCYTES # BLD AUTO: 0.69 THOUSAND/ΜL (ref 0.17–1.22)
MONOCYTES NFR BLD AUTO: 10 % (ref 4–12)
NEUTROPHILS # BLD AUTO: 5.27 THOUSANDS/ΜL (ref 1.85–7.62)
NEUTS SEG NFR BLD AUTO: 74 % (ref 43–75)
NON VENT ROOM AIR: ABNORMAL %
O2 CT BLDA-SCNC: 13.2 ML/DL (ref 16–23)
OXYHGB MFR BLDA: 96.4 % (ref 94–97)
PCO2 BLDA: 23.1 MM HG (ref 36–44)
PH BLDA: 7.21 [PH] (ref 7.35–7.45)
PHOSPHATE SERPL-MCNC: 5.1 MG/DL (ref 2.7–4.5)
PLATELET # BLD AUTO: 430 THOUSANDS/UL (ref 149–390)
PMV BLD AUTO: 9.5 FL (ref 8.9–12.7)
PO2 BLDA: 116.1 MM HG (ref 75–129)
POTASSIUM SERPL-SCNC: 3.9 MMOL/L (ref 3.5–5.3)
PROT SERPL-MCNC: 6.2 G/DL (ref 6.4–8.2)
PROTHROMBIN TIME: 18 SECONDS (ref 12.1–14.4)
RBC # BLD AUTO: 3.45 MILLION/UL (ref 3.88–5.62)
SODIUM SERPL-SCNC: 136 MMOL/L (ref 136–145)
SPECIMEN SOURCE: ABNORMAL
WBC # BLD AUTO: 7.07 THOUSAND/UL (ref 4.31–10.16)

## 2017-08-01 PROCEDURE — 84100 ASSAY OF PHOSPHORUS: CPT | Performed by: INTERNAL MEDICINE

## 2017-08-01 PROCEDURE — 85610 PROTHROMBIN TIME: CPT | Performed by: INTERNAL MEDICINE

## 2017-08-01 PROCEDURE — 82306 VITAMIN D 25 HYDROXY: CPT | Performed by: INTERNAL MEDICINE

## 2017-08-01 PROCEDURE — 82948 REAGENT STRIP/BLOOD GLUCOSE: CPT

## 2017-08-01 PROCEDURE — 85025 COMPLETE CBC W/AUTO DIFF WBC: CPT | Performed by: INTERNAL MEDICINE

## 2017-08-01 PROCEDURE — 80197 ASSAY OF TACROLIMUS: CPT | Performed by: INTERNAL MEDICINE

## 2017-08-01 PROCEDURE — 83735 ASSAY OF MAGNESIUM: CPT | Performed by: INTERNAL MEDICINE

## 2017-08-01 PROCEDURE — 82330 ASSAY OF CALCIUM: CPT | Performed by: INTERNAL MEDICINE

## 2017-08-01 PROCEDURE — 80053 COMPREHEN METABOLIC PANEL: CPT | Performed by: INTERNAL MEDICINE

## 2017-08-01 PROCEDURE — 82805 BLOOD GASES W/O2 SATURATION: CPT | Performed by: INTERNAL MEDICINE

## 2017-08-01 PROCEDURE — 36600 WITHDRAWAL OF ARTERIAL BLOOD: CPT

## 2017-08-01 PROCEDURE — 83605 ASSAY OF LACTIC ACID: CPT | Performed by: INTERNAL MEDICINE

## 2017-08-01 RX ORDER — SODIUM BICARBONATE 650 MG/1
1300 TABLET ORAL
Qty: 120 TABLET | Refills: 0 | Status: SHIPPED | OUTPATIENT
Start: 2017-08-01 | End: 2017-09-25 | Stop reason: HOSPADM

## 2017-08-01 RX ORDER — MAGNESIUM SULFATE HEPTAHYDRATE 40 MG/ML
2 INJECTION, SOLUTION INTRAVENOUS ONCE
Status: COMPLETED | OUTPATIENT
Start: 2017-08-01 | End: 2017-08-01

## 2017-08-01 RX ORDER — TAMSULOSIN HYDROCHLORIDE 0.4 MG/1
0.4 CAPSULE ORAL
Status: DISCONTINUED | OUTPATIENT
Start: 2017-08-01 | End: 2017-08-01 | Stop reason: HOSPADM

## 2017-08-01 RX ORDER — MAGNESIUM SULFATE HEPTAHYDRATE 40 MG/ML
4 INJECTION, SOLUTION INTRAVENOUS ONCE
Status: DISCONTINUED | OUTPATIENT
Start: 2017-08-01 | End: 2017-08-01

## 2017-08-01 RX ORDER — MAGNESIUM SULFATE HEPTAHYDRATE 40 MG/ML
4 INJECTION, SOLUTION INTRAVENOUS ONCE
Status: DISCONTINUED | OUTPATIENT
Start: 2017-08-01 | End: 2017-08-01 | Stop reason: RX

## 2017-08-01 RX ORDER — TAMSULOSIN HYDROCHLORIDE 0.4 MG/1
0.4 CAPSULE ORAL
Qty: 30 CAPSULE | Refills: 0 | Status: SHIPPED | OUTPATIENT
Start: 2017-08-01 | End: 2017-09-23 | Stop reason: ALTCHOICE

## 2017-08-01 RX ORDER — SODIUM BICARBONATE 650 MG/1
1300 TABLET ORAL
Status: DISCONTINUED | OUTPATIENT
Start: 2017-08-01 | End: 2017-08-01 | Stop reason: HOSPADM

## 2017-08-01 RX ADMIN — MAGNESIUM SULFATE HEPTAHYDRATE 2 G: 40 INJECTION, SOLUTION INTRAVENOUS at 11:27

## 2017-08-01 RX ADMIN — INSULIN LISPRO 1 UNITS: 100 INJECTION, SOLUTION INTRAVENOUS; SUBCUTANEOUS at 11:21

## 2017-08-01 RX ADMIN — SODIUM BICARBONATE 1300 MG: 650 TABLET, ORALLY DISINTEGRATING ORAL at 11:23

## 2017-08-01 RX ADMIN — MAGNESIUM SULFATE HEPTAHYDRATE 2 G: 40 INJECTION, SOLUTION INTRAVENOUS at 09:05

## 2017-08-01 RX ADMIN — MYCOPHENOLATE MOFETIL 750 MG: 250 CAPSULE ORAL at 09:06

## 2017-08-01 RX ADMIN — VANCOMYCIN HYDROCHLORIDE 1000 MG: 1 INJECTION, POWDER, LYOPHILIZED, FOR SOLUTION INTRAVENOUS at 01:29

## 2017-08-01 RX ADMIN — INSULIN DETEMIR 10 UNITS: 100 INJECTION, SOLUTION SUBCUTANEOUS at 09:05

## 2017-08-01 RX ADMIN — HYDROCORTISONE SODIUM SUCCINATE 100 MG: 100 INJECTION, POWDER, FOR SOLUTION INTRAMUSCULAR; INTRAVENOUS at 09:05

## 2017-08-01 RX ADMIN — TACROLIMUS 0.5 MG: 0.5 CAPSULE ORAL at 09:06

## 2017-08-01 RX ADMIN — DOCUSATE SODIUM 100 MG: 100 CAPSULE, LIQUID FILLED ORAL at 09:05

## 2017-08-01 RX ADMIN — HEPARIN SODIUM 5000 UNITS: 5000 INJECTION, SOLUTION INTRAVENOUS; SUBCUTANEOUS at 05:39

## 2017-08-02 ENCOUNTER — GENERIC CONVERSION - ENCOUNTER (OUTPATIENT)
Dept: FAMILY MEDICINE CLINIC | Facility: CLINIC | Age: 47
End: 2017-08-02

## 2017-08-02 ENCOUNTER — ALLSCRIPTS OFFICE VISIT (OUTPATIENT)
Dept: OTHER | Facility: OTHER | Age: 47
End: 2017-08-02

## 2017-08-02 DIAGNOSIS — E55.9 VITAMIN D DEFICIENCY: ICD-10-CM

## 2017-08-02 DIAGNOSIS — S82.401D CLOSED FRACTURE OF SHAFT OF RIGHT FIBULA WITH ROUTINE HEALING: ICD-10-CM

## 2017-08-02 DIAGNOSIS — E10.9 TYPE 1 DIABETES MELLITUS WITHOUT COMPLICATIONS (HCC): ICD-10-CM

## 2017-08-02 DIAGNOSIS — Z11.59 ENCOUNTER FOR SCREENING FOR OTHER VIRAL DISEASES: ICD-10-CM

## 2017-08-02 DIAGNOSIS — S82.201D CLOSED FRACTURE OF SHAFT OF RIGHT TIBIA WITH ROUTINE HEALING: ICD-10-CM

## 2017-08-02 DIAGNOSIS — N18.4 CHRONIC KIDNEY DISEASE, STAGE IV (SEVERE) (HCC): ICD-10-CM

## 2017-08-02 DIAGNOSIS — S92.001D: ICD-10-CM

## 2017-08-02 DIAGNOSIS — E87.2 ACIDOSIS: ICD-10-CM

## 2017-08-02 LAB
BACTERIA SPEC ANAEROBE CULT: NORMAL
BACTERIA WND AEROBE CULT: NORMAL
BACTERIA WND AEROBE CULT: NORMAL
GRAM STN SPEC: NORMAL

## 2017-08-03 ENCOUNTER — APPOINTMENT (OUTPATIENT)
Dept: PULMONOLOGY | Facility: HOSPITAL | Age: 47
End: 2017-08-03
Attending: FAMILY MEDICINE
Payer: COMMERCIAL

## 2017-08-03 ENCOUNTER — APPOINTMENT (OUTPATIENT)
Dept: LAB | Facility: HOSPITAL | Age: 47
End: 2017-08-03
Attending: FAMILY MEDICINE
Payer: COMMERCIAL

## 2017-08-03 ENCOUNTER — TRANSCRIBE ORDERS (OUTPATIENT)
Dept: ADMINISTRATIVE | Facility: HOSPITAL | Age: 47
End: 2017-08-03

## 2017-08-03 DIAGNOSIS — E55.9 VITAMIN D DEFICIENCY: ICD-10-CM

## 2017-08-03 DIAGNOSIS — Z11.59 ENCOUNTER FOR SCREENING FOR OTHER VIRAL DISEASES: ICD-10-CM

## 2017-08-03 DIAGNOSIS — E87.2 ACIDOSIS: ICD-10-CM

## 2017-08-03 DIAGNOSIS — E10.9 TYPE 1 DIABETES MELLITUS WITHOUT COMPLICATIONS (HCC): ICD-10-CM

## 2017-08-03 DIAGNOSIS — N18.4 CHRONIC KIDNEY DISEASE, STAGE IV (SEVERE) (HCC): ICD-10-CM

## 2017-08-03 LAB
25(OH)D3 SERPL-MCNC: 7.9 NG/ML (ref 30–100)
ANION GAP SERPL CALCULATED.3IONS-SCNC: 16 MMOL/L (ref 4–13)
BUN SERPL-MCNC: 28 MG/DL (ref 5–25)
CALCIUM SERPL-MCNC: 7.8 MG/DL (ref 8.3–10.1)
CHLORIDE SERPL-SCNC: 111 MMOL/L (ref 100–108)
CHOLEST SERPL-MCNC: 72 MG/DL (ref 50–200)
CO2 SERPL-SCNC: 15 MMOL/L (ref 21–32)
CREAT SERPL-MCNC: 1.79 MG/DL (ref 0.6–1.3)
EST. AVERAGE GLUCOSE BLD GHB EST-MCNC: 120 MG/DL
GFR SERPL CREATININE-BSD FRML MDRD: 44 ML/MIN/1.73SQ M
GLUCOSE P FAST SERPL-MCNC: 40 MG/DL (ref 65–99)
HBA1C MFR BLD: 5.8 % (ref 4.2–6.3)
HCV AB SER QL: NORMAL
HDLC SERPL-MCNC: 27 MG/DL (ref 40–60)
LACTATE SERPL-SCNC: 0.5 MMOL/L (ref 0.5–2)
LDLC SERPL CALC-MCNC: 31 MG/DL (ref 0–100)
MAGNESIUM SERPL-MCNC: 1.8 MG/DL (ref 1.6–2.6)
PHOSPHATE SERPL-MCNC: 4.6 MG/DL (ref 2.7–4.5)
POTASSIUM SERPL-SCNC: 4.2 MMOL/L (ref 3.5–5.3)
SODIUM SERPL-SCNC: 142 MMOL/L (ref 136–145)
TACROLIMUS BLD LC/MS/MS-MCNC: 17.2 NG/ML (ref 2–20)
TRIGL SERPL-MCNC: 68 MG/DL

## 2017-08-03 PROCEDURE — 36415 COLL VENOUS BLD VENIPUNCTURE: CPT

## 2017-08-03 PROCEDURE — 80061 LIPID PANEL: CPT

## 2017-08-03 PROCEDURE — 83605 ASSAY OF LACTIC ACID: CPT

## 2017-08-03 PROCEDURE — 82306 VITAMIN D 25 HYDROXY: CPT

## 2017-08-03 PROCEDURE — 83735 ASSAY OF MAGNESIUM: CPT

## 2017-08-03 PROCEDURE — 83036 HEMOGLOBIN GLYCOSYLATED A1C: CPT

## 2017-08-03 PROCEDURE — 80048 BASIC METABOLIC PNL TOTAL CA: CPT

## 2017-08-03 PROCEDURE — 86803 HEPATITIS C AB TEST: CPT

## 2017-08-03 PROCEDURE — 84100 ASSAY OF PHOSPHORUS: CPT

## 2017-08-04 ENCOUNTER — GENERIC CONVERSION - ENCOUNTER (OUTPATIENT)
Dept: OTHER | Facility: OTHER | Age: 47
End: 2017-08-04

## 2017-08-05 LAB
BACTERIA BLD CULT: NORMAL
BACTERIA BLD CULT: NORMAL

## 2017-08-08 ENCOUNTER — ALLSCRIPTS OFFICE VISIT (OUTPATIENT)
Dept: OTHER | Facility: OTHER | Age: 47
End: 2017-08-08

## 2017-08-16 ENCOUNTER — ALLSCRIPTS OFFICE VISIT (OUTPATIENT)
Dept: OTHER | Facility: OTHER | Age: 47
End: 2017-08-16

## 2017-08-16 ENCOUNTER — ANESTHESIA EVENT (OUTPATIENT)
Dept: PERIOP | Facility: HOSPITAL | Age: 47
End: 2017-08-16
Payer: COMMERCIAL

## 2017-08-16 ENCOUNTER — APPOINTMENT (OUTPATIENT)
Dept: RADIOLOGY | Facility: MEDICAL CENTER | Age: 47
End: 2017-08-16
Payer: COMMERCIAL

## 2017-08-16 DIAGNOSIS — S92.001D: ICD-10-CM

## 2017-08-16 PROCEDURE — 73610 X-RAY EXAM OF ANKLE: CPT

## 2017-08-17 ENCOUNTER — ANESTHESIA (OUTPATIENT)
Dept: PERIOP | Facility: HOSPITAL | Age: 47
End: 2017-08-17
Payer: COMMERCIAL

## 2017-08-17 ENCOUNTER — HOSPITAL ENCOUNTER (OUTPATIENT)
Dept: RADIOLOGY | Facility: HOSPITAL | Age: 47
Discharge: HOME | End: 2017-08-17
Payer: COMMERCIAL

## 2017-08-17 ENCOUNTER — HOSPITAL ENCOUNTER (OUTPATIENT)
Facility: HOSPITAL | Age: 47
Setting detail: OUTPATIENT SURGERY
Discharge: HOME | End: 2017-08-17
Attending: ORTHOPAEDIC SURGERY | Admitting: ORTHOPAEDIC SURGERY
Payer: COMMERCIAL

## 2017-08-17 VITALS
TEMPERATURE: 97.9 F | HEART RATE: 77 BPM | RESPIRATION RATE: 18 BRPM | SYSTOLIC BLOOD PRESSURE: 102 MMHG | OXYGEN SATURATION: 97 % | DIASTOLIC BLOOD PRESSURE: 56 MMHG

## 2017-08-17 DIAGNOSIS — T84.7XXD INFECTION AND INFLAMMATORY REACTION DUE TO OTHER INTERNAL ORTHOPEDIC PROSTHETIC DEVICES, IMPLANTS AND GRAFTS, SUBSEQUENT ENCOUNTER: ICD-10-CM

## 2017-08-17 DIAGNOSIS — Z96.9 RETAINED ORTHOPEDIC HARDWARE: ICD-10-CM

## 2017-08-17 PROBLEM — T85.848A PAIN FROM IMPLANTED HARDWARE: Status: ACTIVE | Noted: 2017-08-17

## 2017-08-17 LAB — GLUCOSE SERPL-MCNC: 124 MG/DL (ref 65–140)

## 2017-08-17 PROCEDURE — 87070 CULTURE OTHR SPECIMN AEROBIC: CPT | Performed by: ORTHOPAEDIC SURGERY

## 2017-08-17 PROCEDURE — 87205 SMEAR GRAM STAIN: CPT | Performed by: ORTHOPAEDIC SURGERY

## 2017-08-17 PROCEDURE — 73590 X-RAY EXAM OF LOWER LEG: CPT

## 2017-08-17 PROCEDURE — 87176 TISSUE HOMOGENIZATION CULTR: CPT | Performed by: ORTHOPAEDIC SURGERY

## 2017-08-17 PROCEDURE — 87075 CULTR BACTERIA EXCEPT BLOOD: CPT | Performed by: ORTHOPAEDIC SURGERY

## 2017-08-17 PROCEDURE — 82948 REAGENT STRIP/BLOOD GLUCOSE: CPT

## 2017-08-17 RX ORDER — GINSENG 100 MG
CAPSULE ORAL AS NEEDED
Status: DISCONTINUED | OUTPATIENT
Start: 2017-08-17 | End: 2017-08-17 | Stop reason: HOSPADM

## 2017-08-17 RX ORDER — LIDOCAINE HYDROCHLORIDE 10 MG/ML
INJECTION, SOLUTION INFILTRATION; PERINEURAL AS NEEDED
Status: DISCONTINUED | OUTPATIENT
Start: 2017-08-17 | End: 2017-08-17 | Stop reason: SURG

## 2017-08-17 RX ORDER — SODIUM BICARBONATE 650 MG/1
1300 TABLET ORAL
Status: CANCELLED | OUTPATIENT
Start: 2017-08-17

## 2017-08-17 RX ORDER — OXYCODONE HYDROCHLORIDE AND ACETAMINOPHEN 5; 325 MG/1; MG/1
1 TABLET ORAL EVERY 4 HOURS PRN
Status: CANCELLED | OUTPATIENT
Start: 2017-08-17

## 2017-08-17 RX ORDER — SODIUM CHLORIDE, SODIUM LACTATE, POTASSIUM CHLORIDE, CALCIUM CHLORIDE 600; 310; 30; 20 MG/100ML; MG/100ML; MG/100ML; MG/100ML
50 INJECTION, SOLUTION INTRAVENOUS CONTINUOUS
Status: DISCONTINUED | OUTPATIENT
Start: 2017-08-17 | End: 2017-08-17 | Stop reason: HOSPADM

## 2017-08-17 RX ORDER — FENTANYL CITRATE 50 UG/ML
INJECTION, SOLUTION INTRAMUSCULAR; INTRAVENOUS AS NEEDED
Status: DISCONTINUED | OUTPATIENT
Start: 2017-08-17 | End: 2017-08-17 | Stop reason: SURG

## 2017-08-17 RX ORDER — SODIUM CHLORIDE 9 MG/ML
INJECTION, SOLUTION INTRAVENOUS CONTINUOUS PRN
Status: DISCONTINUED | OUTPATIENT
Start: 2017-08-17 | End: 2017-08-17 | Stop reason: SURG

## 2017-08-17 RX ORDER — OXYCODONE HYDROCHLORIDE 5 MG/1
5 TABLET ORAL EVERY 6 HOURS PRN
Qty: 30 TABLET | Refills: 0 | Status: SHIPPED | OUTPATIENT
Start: 2017-08-17 | End: 2017-09-25 | Stop reason: HOSPADM

## 2017-08-17 RX ORDER — ONDANSETRON 2 MG/ML
4 INJECTION INTRAMUSCULAR; INTRAVENOUS ONCE AS NEEDED
Status: DISCONTINUED | OUTPATIENT
Start: 2017-08-17 | End: 2017-08-17 | Stop reason: HOSPADM

## 2017-08-17 RX ORDER — PREDNISONE 1 MG/1
5 TABLET ORAL DAILY
Status: CANCELLED | OUTPATIENT
Start: 2017-08-17

## 2017-08-17 RX ORDER — METOCLOPRAMIDE HYDROCHLORIDE 5 MG/ML
INJECTION INTRAMUSCULAR; INTRAVENOUS AS NEEDED
Status: DISCONTINUED | OUTPATIENT
Start: 2017-08-17 | End: 2017-08-17 | Stop reason: SURG

## 2017-08-17 RX ORDER — DIPHENOXYLATE HYDROCHLORIDE AND ATROPINE SULFATE 2.5; .025 MG/1; MG/1
1 TABLET ORAL DAILY
Status: CANCELLED | OUTPATIENT
Start: 2017-08-17

## 2017-08-17 RX ORDER — CEPHALEXIN 500 MG/1
500 CAPSULE ORAL 4 TIMES DAILY
Qty: 20 CAPSULE | Refills: 0 | Status: SHIPPED | OUTPATIENT
Start: 2017-08-17 | End: 2017-08-17

## 2017-08-17 RX ORDER — TACROLIMUS 0.5 MG/1
0.5 CAPSULE ORAL 2 TIMES DAILY
Status: CANCELLED | OUTPATIENT
Start: 2017-08-17

## 2017-08-17 RX ORDER — CEPHALEXIN 500 MG/1
500 CAPSULE ORAL 4 TIMES DAILY
Qty: 20 CAPSULE | Refills: 0 | Status: SHIPPED | OUTPATIENT
Start: 2017-08-17 | End: 2017-08-27

## 2017-08-17 RX ORDER — MYCOPHENOLATE MOFETIL 250 MG/1
750 CAPSULE ORAL 2 TIMES DAILY
Status: CANCELLED | OUTPATIENT
Start: 2017-08-17

## 2017-08-17 RX ORDER — MAGNESIUM HYDROXIDE 1200 MG/15ML
LIQUID ORAL AS NEEDED
Status: DISCONTINUED | OUTPATIENT
Start: 2017-08-17 | End: 2017-08-17 | Stop reason: HOSPADM

## 2017-08-17 RX ORDER — MIDAZOLAM HYDROCHLORIDE 1 MG/ML
INJECTION INTRAMUSCULAR; INTRAVENOUS AS NEEDED
Status: DISCONTINUED | OUTPATIENT
Start: 2017-08-17 | End: 2017-08-17 | Stop reason: SURG

## 2017-08-17 RX ORDER — FENTANYL CITRATE/PF 50 MCG/ML
25 SYRINGE (ML) INJECTION
Status: DISCONTINUED | OUTPATIENT
Start: 2017-08-17 | End: 2017-08-17 | Stop reason: HOSPADM

## 2017-08-17 RX ORDER — ONDANSETRON 2 MG/ML
INJECTION INTRAMUSCULAR; INTRAVENOUS AS NEEDED
Status: DISCONTINUED | OUTPATIENT
Start: 2017-08-17 | End: 2017-08-17 | Stop reason: SURG

## 2017-08-17 RX ORDER — TAMSULOSIN HYDROCHLORIDE 0.4 MG/1
0.4 CAPSULE ORAL
Status: CANCELLED | OUTPATIENT
Start: 2017-08-17

## 2017-08-17 RX ORDER — CEPHALEXIN 500 MG/1
500 CAPSULE ORAL EVERY 6 HOURS SCHEDULED
Status: DISCONTINUED | OUTPATIENT
Start: 2017-08-17 | End: 2017-08-17 | Stop reason: HOSPADM

## 2017-08-17 RX ORDER — PROPOFOL 10 MG/ML
INJECTION, EMULSION INTRAVENOUS AS NEEDED
Status: DISCONTINUED | OUTPATIENT
Start: 2017-08-17 | End: 2017-08-17 | Stop reason: SURG

## 2017-08-17 RX ORDER — ATENOLOL 25 MG/1
25 TABLET ORAL DAILY
Status: CANCELLED | OUTPATIENT
Start: 2017-08-17

## 2017-08-17 RX ADMIN — METOCLOPRAMIDE HYDROCHLORIDE 10 MG: 5 INJECTION INTRAMUSCULAR; INTRAVENOUS at 10:05

## 2017-08-17 RX ADMIN — FENTANYL CITRATE 25 MCG: 50 INJECTION, SOLUTION INTRAMUSCULAR; INTRAVENOUS at 10:22

## 2017-08-17 RX ADMIN — PHENYLEPHRINE HYDROCHLORIDE 20 MCG/MIN: 10 INJECTION, SOLUTION INTRAMUSCULAR; INTRAVENOUS; SUBCUTANEOUS at 10:26

## 2017-08-17 RX ADMIN — MIDAZOLAM HYDROCHLORIDE 1 MG: 1 INJECTION, SOLUTION INTRAMUSCULAR; INTRAVENOUS at 10:12

## 2017-08-17 RX ADMIN — MIDAZOLAM HYDROCHLORIDE 1 MG: 1 INJECTION, SOLUTION INTRAMUSCULAR; INTRAVENOUS at 10:09

## 2017-08-17 RX ADMIN — FENTANYL CITRATE 25 MCG: 50 INJECTION, SOLUTION INTRAMUSCULAR; INTRAVENOUS at 10:45

## 2017-08-17 RX ADMIN — PROPOFOL 50 MG: 10 INJECTION, EMULSION INTRAVENOUS at 10:20

## 2017-08-17 RX ADMIN — LIDOCAINE HYDROCHLORIDE 50 MG: 10 INJECTION, SOLUTION INFILTRATION; PERINEURAL at 10:19

## 2017-08-17 RX ADMIN — SODIUM CHLORIDE: 0.9 INJECTION, SOLUTION INTRAVENOUS at 10:00

## 2017-08-17 RX ADMIN — VANCOMYCIN HYDROCHLORIDE 1000 MG: 1 INJECTION, POWDER, LYOPHILIZED, FOR SOLUTION INTRAVENOUS at 10:00

## 2017-08-17 RX ADMIN — DEXAMETHASONE SODIUM PHOSPHATE 4 MG: 10 INJECTION INTRAMUSCULAR; INTRAVENOUS at 10:05

## 2017-08-17 RX ADMIN — ONDANSETRON HYDROCHLORIDE 4 MG: 2 INJECTION, SOLUTION INTRAVENOUS at 10:05

## 2017-08-17 RX ADMIN — PROPOFOL 100 MG: 10 INJECTION, EMULSION INTRAVENOUS at 10:19

## 2017-08-17 RX ADMIN — SODIUM CHLORIDE, SODIUM LACTATE, POTASSIUM CHLORIDE, AND CALCIUM CHLORIDE 50 ML/HR: .6; .31; .03; .02 INJECTION, SOLUTION INTRAVENOUS at 09:24

## 2017-08-20 LAB
BACTERIA SPEC ANAEROBE CULT: NO GROWTH
BACTERIA TISS AEROBE CULT: NO GROWTH
GRAM STN SPEC: NORMAL

## 2017-08-29 ENCOUNTER — APPOINTMENT (OUTPATIENT)
Dept: RADIOLOGY | Facility: MEDICAL CENTER | Age: 47
End: 2017-08-29
Payer: COMMERCIAL

## 2017-08-29 ENCOUNTER — ALLSCRIPTS OFFICE VISIT (OUTPATIENT)
Dept: OTHER | Facility: OTHER | Age: 47
End: 2017-08-29

## 2017-08-29 DIAGNOSIS — S82.201D CLOSED FRACTURE OF SHAFT OF RIGHT TIBIA WITH ROUTINE HEALING: ICD-10-CM

## 2017-08-29 DIAGNOSIS — S82.401D CLOSED FRACTURE OF SHAFT OF RIGHT FIBULA WITH ROUTINE HEALING: ICD-10-CM

## 2017-08-29 PROCEDURE — 73610 X-RAY EXAM OF ANKLE: CPT

## 2017-09-05 ENCOUNTER — GENERIC CONVERSION - ENCOUNTER (OUTPATIENT)
Dept: OTHER | Facility: OTHER | Age: 47
End: 2017-09-05

## 2017-09-12 ENCOUNTER — GENERIC CONVERSION - ENCOUNTER (OUTPATIENT)
Dept: OTHER | Facility: OTHER | Age: 47
End: 2017-09-12

## 2017-09-12 ENCOUNTER — APPOINTMENT (OUTPATIENT)
Dept: RADIOLOGY | Facility: MEDICAL CENTER | Age: 47
End: 2017-09-12
Payer: COMMERCIAL

## 2017-09-12 DIAGNOSIS — R79.0 ABNORMAL LEVEL OF BLOOD MINERAL: ICD-10-CM

## 2017-09-12 DIAGNOSIS — E83.40 DISORDER OF MAGNESIUM METABOLISM: ICD-10-CM

## 2017-09-12 DIAGNOSIS — S82.201D CLOSED FRACTURE OF SHAFT OF RIGHT TIBIA WITH ROUTINE HEALING: ICD-10-CM

## 2017-09-12 DIAGNOSIS — S82.401D CLOSED FRACTURE OF SHAFT OF RIGHT FIBULA WITH ROUTINE HEALING: ICD-10-CM

## 2017-09-12 DIAGNOSIS — E87.6 HYPOKALEMIA: ICD-10-CM

## 2017-09-12 PROCEDURE — 73610 X-RAY EXAM OF ANKLE: CPT

## 2017-09-20 ENCOUNTER — GENERIC CONVERSION - ENCOUNTER (OUTPATIENT)
Dept: OTHER | Facility: OTHER | Age: 47
End: 2017-09-20

## 2017-09-23 ENCOUNTER — APPOINTMENT (EMERGENCY)
Dept: RADIOLOGY | Facility: HOSPITAL | Age: 47
DRG: 637 | End: 2017-09-23
Payer: COMMERCIAL

## 2017-09-23 ENCOUNTER — APPOINTMENT (INPATIENT)
Dept: ULTRASOUND IMAGING | Facility: HOSPITAL | Age: 47
DRG: 637 | End: 2017-09-23
Payer: COMMERCIAL

## 2017-09-23 ENCOUNTER — HOSPITAL ENCOUNTER (INPATIENT)
Facility: HOSPITAL | Age: 47
LOS: 2 days | Discharge: ANOTHER HOSPITAL | DRG: 637 | End: 2017-09-25
Attending: EMERGENCY MEDICINE | Admitting: FAMILY MEDICINE
Payer: COMMERCIAL

## 2017-09-23 DIAGNOSIS — S82.301D: ICD-10-CM

## 2017-09-23 DIAGNOSIS — N17.9 AKI (ACUTE KIDNEY INJURY) (HCC): ICD-10-CM

## 2017-09-23 DIAGNOSIS — R93.89 ABNORMAL X-RAY: ICD-10-CM

## 2017-09-23 DIAGNOSIS — E10.10 DIABETIC KETOACIDOSIS WITHOUT COMA ASSOCIATED WITH TYPE 1 DIABETES MELLITUS (HCC): Primary | ICD-10-CM

## 2017-09-23 DIAGNOSIS — R77.8 ELEVATED TROPONIN: ICD-10-CM

## 2017-09-23 DIAGNOSIS — R79.89 ELEVATED PLATELET COUNT: ICD-10-CM

## 2017-09-23 DIAGNOSIS — E10.22 TYPE 1 DIABETES MELLITUS WITH DIABETIC CHRONIC KIDNEY DISEASE, UNSPECIFIED CKD STAGE (HCC): ICD-10-CM

## 2017-09-23 DIAGNOSIS — R79.1 ELEVATED INR: ICD-10-CM

## 2017-09-23 DIAGNOSIS — R33.9 URINARY RETENTION: Chronic | ICD-10-CM

## 2017-09-23 DIAGNOSIS — L03.119 CELLULITIS OF ANKLE: ICD-10-CM

## 2017-09-23 DIAGNOSIS — Z94.0 RENAL TRANSPLANT, STATUS POST: ICD-10-CM

## 2017-09-23 DIAGNOSIS — M79.671 RIGHT FOOT PAIN: ICD-10-CM

## 2017-09-23 DIAGNOSIS — M86.669: ICD-10-CM

## 2017-09-23 DIAGNOSIS — R09.89 POOR CIRCULATION: ICD-10-CM

## 2017-09-23 DIAGNOSIS — I21.A1 NON-ST ELEVATION MYOCARDIAL INFARCTION (NSTEMI), TYPE 2: ICD-10-CM

## 2017-09-23 DIAGNOSIS — E87.5 HYPERKALEMIA: ICD-10-CM

## 2017-09-23 DIAGNOSIS — T85.848A PAIN FROM IMPLANTED HARDWARE: ICD-10-CM

## 2017-09-23 DIAGNOSIS — D84.9 IMMUNOSUPPRESSION (HCC): ICD-10-CM

## 2017-09-23 DIAGNOSIS — N18.4 CHRONIC KIDNEY DISEASE, STAGE IV (SEVERE) (HCC): ICD-10-CM

## 2017-09-23 DIAGNOSIS — N17.9 ACUTE RENAL FAILURE (HCC): ICD-10-CM

## 2017-09-23 PROBLEM — E11.10 DKA (DIABETIC KETOACIDOSES): Status: ACTIVE | Noted: 2017-09-23

## 2017-09-23 PROBLEM — R19.7 DIARRHEA: Status: ACTIVE | Noted: 2017-09-23

## 2017-09-23 LAB
ACETONE SERPL-MCNC: ABNORMAL MG/DL
ALBUMIN SERPL BCP-MCNC: 3.2 G/DL (ref 3.5–5)
ALP SERPL-CCNC: 289 U/L (ref 46–116)
ALT SERPL W P-5'-P-CCNC: 22 U/L (ref 12–78)
ANION GAP SERPL CALCULATED.3IONS-SCNC: 20 MMOL/L (ref 4–13)
ANION GAP SERPL CALCULATED.3IONS-SCNC: 21 MMOL/L (ref 4–13)
ANION GAP SERPL CALCULATED.3IONS-SCNC: 22 MMOL/L (ref 4–13)
ANION GAP SERPL CALCULATED.3IONS-SCNC: 23 MMOL/L (ref 4–13)
ANION GAP SERPL CALCULATED.3IONS-SCNC: 23 MMOL/L (ref 4–13)
APTT PPP: 79 SECONDS (ref 23–35)
ARTERIAL PATENCY WRIST A: YES
ARTERIAL PATENCY WRIST A: YES
AST SERPL W P-5'-P-CCNC: 24 U/L (ref 5–45)
BACTERIA UR QL AUTO: ABNORMAL /HPF
BASE EXCESS BLDA CALC-SCNC: -25.1 MMOL/L
BASE EXCESS BLDA CALC-SCNC: -27.4 MMOL/L
BASOPHILS # BLD AUTO: 0.01 THOUSANDS/ΜL (ref 0–0.1)
BASOPHILS NFR BLD AUTO: 0 % (ref 0–1)
BILIRUB SERPL-MCNC: 0.3 MG/DL (ref 0.2–1)
BILIRUB UR QL STRIP: NEGATIVE
BUN SERPL-MCNC: 77 MG/DL (ref 5–25)
BUN SERPL-MCNC: 78 MG/DL (ref 5–25)
BUN SERPL-MCNC: 80 MG/DL (ref 5–25)
BUN SERPL-MCNC: 81 MG/DL (ref 5–25)
BUN SERPL-MCNC: 83 MG/DL (ref 5–25)
BUN SERPL-MCNC: 85 MG/DL (ref 5–25)
BUN SERPL-MCNC: 86 MG/DL (ref 5–25)
CALCIUM SERPL-MCNC: 6.8 MG/DL (ref 8.3–10.1)
CALCIUM SERPL-MCNC: 6.9 MG/DL (ref 8.3–10.1)
CALCIUM SERPL-MCNC: 7 MG/DL (ref 8.3–10.1)
CALCIUM SERPL-MCNC: 7.1 MG/DL (ref 8.3–10.1)
CALCIUM SERPL-MCNC: 7.1 MG/DL (ref 8.3–10.1)
CALCIUM SERPL-MCNC: 7.3 MG/DL (ref 8.3–10.1)
CHLORIDE SERPL-SCNC: 102 MMOL/L (ref 100–108)
CHLORIDE SERPL-SCNC: 105 MMOL/L (ref 100–108)
CHLORIDE SERPL-SCNC: 108 MMOL/L (ref 100–108)
CHLORIDE SERPL-SCNC: 108 MMOL/L (ref 100–108)
CHLORIDE SERPL-SCNC: 109 MMOL/L (ref 100–108)
CHLORIDE SERPL-SCNC: 109 MMOL/L (ref 100–108)
CHLORIDE SERPL-SCNC: 111 MMOL/L (ref 100–108)
CHLORIDE SERPL-SCNC: 111 MMOL/L (ref 100–108)
CHLORIDE SERPL-SCNC: 112 MMOL/L (ref 100–108)
CLARITY UR: ABNORMAL
CO2 SERPL-SCNC: 5 MMOL/L (ref 21–32)
CO2 SERPL-SCNC: 6 MMOL/L (ref 21–32)
CO2 SERPL-SCNC: 6 MMOL/L (ref 21–32)
CO2 SERPL-SCNC: <5 MMOL/L (ref 21–32)
CO2 SERPL-SCNC: <5 MMOL/L (ref 21–32)
COARSE GRAN CASTS URNS QL MICRO: ABNORMAL /LPF
COLOR UR: YELLOW
CREAT SERPL-MCNC: 3.97 MG/DL (ref 0.6–1.3)
CREAT SERPL-MCNC: 3.98 MG/DL (ref 0.6–1.3)
CREAT SERPL-MCNC: 4 MG/DL (ref 0.6–1.3)
CREAT SERPL-MCNC: 4.02 MG/DL (ref 0.6–1.3)
CREAT SERPL-MCNC: 4.11 MG/DL (ref 0.6–1.3)
CREAT SERPL-MCNC: 4.15 MG/DL (ref 0.6–1.3)
CREAT SERPL-MCNC: 4.23 MG/DL (ref 0.6–1.3)
CREAT SERPL-MCNC: 4.38 MG/DL (ref 0.6–1.3)
CREAT SERPL-MCNC: 4.77 MG/DL (ref 0.6–1.3)
EOSINOPHIL # BLD AUTO: 0 THOUSAND/ΜL (ref 0–0.61)
EOSINOPHIL NFR BLD AUTO: 0 % (ref 0–6)
ERYTHROCYTE [DISTWIDTH] IN BLOOD BY AUTOMATED COUNT: 17.7 % (ref 11.6–15.1)
GFR SERPL CREATININE-BSD FRML MDRD: 13 ML/MIN/1.73SQ M
GFR SERPL CREATININE-BSD FRML MDRD: 15 ML/MIN/1.73SQ M
GFR SERPL CREATININE-BSD FRML MDRD: 16 ML/MIN/1.73SQ M
GFR SERPL CREATININE-BSD FRML MDRD: 17 ML/MIN/1.73SQ M
GLUCOSE SERPL-MCNC: 102 MG/DL (ref 65–140)
GLUCOSE SERPL-MCNC: 103 MG/DL (ref 65–140)
GLUCOSE SERPL-MCNC: 104 MG/DL (ref 65–140)
GLUCOSE SERPL-MCNC: 111 MG/DL (ref 65–140)
GLUCOSE SERPL-MCNC: 111 MG/DL (ref 65–140)
GLUCOSE SERPL-MCNC: 112 MG/DL (ref 65–140)
GLUCOSE SERPL-MCNC: 137 MG/DL (ref 65–140)
GLUCOSE SERPL-MCNC: 157 MG/DL (ref 65–140)
GLUCOSE SERPL-MCNC: 160 MG/DL (ref 65–140)
GLUCOSE SERPL-MCNC: 160 MG/DL (ref 65–140)
GLUCOSE SERPL-MCNC: 165 MG/DL (ref 65–140)
GLUCOSE SERPL-MCNC: 165 MG/DL (ref 65–140)
GLUCOSE SERPL-MCNC: 166 MG/DL (ref 65–140)
GLUCOSE SERPL-MCNC: 167 MG/DL (ref 65–140)
GLUCOSE SERPL-MCNC: 169 MG/DL (ref 65–140)
GLUCOSE SERPL-MCNC: 172 MG/DL (ref 65–140)
GLUCOSE SERPL-MCNC: 202 MG/DL (ref 65–140)
GLUCOSE SERPL-MCNC: 202 MG/DL (ref 65–140)
GLUCOSE SERPL-MCNC: 270 MG/DL (ref 65–140)
GLUCOSE SERPL-MCNC: 326 MG/DL (ref 65–140)
GLUCOSE SERPL-MCNC: 349 MG/DL (ref 65–140)
GLUCOSE SERPL-MCNC: 85 MG/DL (ref 65–140)
GLUCOSE SERPL-MCNC: 86 MG/DL (ref 65–140)
GLUCOSE SERPL-MCNC: 87 MG/DL (ref 65–140)
GLUCOSE SERPL-MCNC: 96 MG/DL (ref 65–140)
GLUCOSE SERPL-MCNC: 98 MG/DL (ref 65–140)
GLUCOSE SERPL-MCNC: 99 MG/DL (ref 65–140)
GLUCOSE UR STRIP-MCNC: NEGATIVE MG/DL
HCO3 BLDA-SCNC: 2.8 MMOL/L (ref 22–28)
HCO3 BLDA-SCNC: 3.7 MMOL/L (ref 22–28)
HCT VFR BLD AUTO: 29.8 % (ref 36.5–49.3)
HGB BLD-MCNC: 9.5 G/DL (ref 12–17)
HGB UR QL STRIP.AUTO: ABNORMAL
INR PPP: 3.83 (ref 0.86–1.16)
KETONES UR STRIP-MCNC: NEGATIVE MG/DL
LACTATE SERPL-SCNC: 1 MMOL/L (ref 0.5–2)
LACTATE SERPL-SCNC: 1.2 MMOL/L (ref 0.5–2)
LEUKOCYTE ESTERASE UR QL STRIP: NEGATIVE
LIPASE SERPL-CCNC: 104 U/L (ref 73–393)
LYMPHOCYTES # BLD AUTO: 0.65 THOUSANDS/ΜL (ref 0.6–4.47)
LYMPHOCYTES NFR BLD AUTO: 11 % (ref 14–44)
MAGNESIUM SERPL-MCNC: 1.2 MG/DL (ref 1.6–2.6)
MAGNESIUM SERPL-MCNC: 1.7 MG/DL (ref 1.6–2.6)
MCH RBC QN AUTO: 26.1 PG (ref 26.8–34.3)
MCHC RBC AUTO-ENTMCNC: 31.9 G/DL (ref 31.4–37.4)
MCV RBC AUTO: 82 FL (ref 82–98)
MONOCYTES # BLD AUTO: 0.98 THOUSAND/ΜL (ref 0.17–1.22)
MONOCYTES NFR BLD AUTO: 17 % (ref 4–12)
NASAL CANNULA: 21
NEUTROPHILS # BLD AUTO: 4.19 THOUSANDS/ΜL (ref 1.85–7.62)
NEUTS SEG NFR BLD AUTO: 72 % (ref 43–75)
NITRITE UR QL STRIP: NEGATIVE
NON VENT ROOM AIR: 21 %
NON-SQ EPI CELLS URNS QL MICRO: ABNORMAL /HPF
NT-PROBNP SERPL-MCNC: ABNORMAL PG/ML
O2 CT BLDA-SCNC: 13.4 ML/DL (ref 16–23)
O2 CT BLDA-SCNC: 13.5 ML/DL (ref 16–23)
OXYHGB MFR BLDA: 91.6 % (ref 94–97)
OXYHGB MFR BLDA: 96.9 % (ref 94–97)
PCO2 BLDA: 12.6 MM HG (ref 36–44)
PCO2 BLDA: 14.5 MM HG (ref 36–44)
PH BLDA: 6.96 [PH] (ref 7.35–7.45)
PH BLDA: 7.03 [PH] (ref 7.35–7.45)
PH BLDV: 6.89 [PH] (ref 7.3–7.4)
PH UR STRIP.AUTO: 5.5 [PH] (ref 4.5–8)
PHOSPHATE SERPL-MCNC: 7.3 MG/DL (ref 2.7–4.5)
PLATELET # BLD AUTO: 422 THOUSANDS/UL (ref 149–390)
PMV BLD AUTO: 9.9 FL (ref 8.9–12.7)
PO2 BLDA: 148.2 MM HG (ref 75–129)
PO2 BLDA: 80.4 MM HG (ref 75–129)
POTASSIUM SERPL-SCNC: 3.7 MMOL/L (ref 3.5–5.3)
POTASSIUM SERPL-SCNC: 4 MMOL/L (ref 3.5–5.3)
POTASSIUM SERPL-SCNC: 4.1 MMOL/L (ref 3.5–5.3)
POTASSIUM SERPL-SCNC: 4.2 MMOL/L (ref 3.5–5.3)
POTASSIUM SERPL-SCNC: 4.2 MMOL/L (ref 3.5–5.3)
POTASSIUM SERPL-SCNC: 4.3 MMOL/L (ref 3.5–5.3)
POTASSIUM SERPL-SCNC: 4.3 MMOL/L (ref 3.5–5.3)
POTASSIUM SERPL-SCNC: 4.5 MMOL/L (ref 3.5–5.3)
POTASSIUM SERPL-SCNC: 4.6 MMOL/L (ref 3.5–5.3)
PROT SERPL-MCNC: 7 G/DL (ref 6.4–8.2)
PROT UR STRIP-MCNC: ABNORMAL MG/DL
PROTHROMBIN TIME: 38 SECONDS (ref 12.1–14.4)
RBC # BLD AUTO: 3.64 MILLION/UL (ref 3.88–5.62)
RBC #/AREA URNS AUTO: ABNORMAL /HPF
SODIUM SERPL-SCNC: 131 MMOL/L (ref 136–145)
SODIUM SERPL-SCNC: 135 MMOL/L (ref 136–145)
SODIUM SERPL-SCNC: 135 MMOL/L (ref 136–145)
SODIUM SERPL-SCNC: 136 MMOL/L (ref 136–145)
SODIUM SERPL-SCNC: 136 MMOL/L (ref 136–145)
SODIUM SERPL-SCNC: 137 MMOL/L (ref 136–145)
SODIUM SERPL-SCNC: 138 MMOL/L (ref 136–145)
SP GR UR STRIP.AUTO: 1.02 (ref 1–1.03)
SPECIMEN SOURCE: ABNORMAL
SPECIMEN SOURCE: ABNORMAL
TROPONIN I SERPL-MCNC: 0.05 NG/ML
TROPONIN I SERPL-MCNC: 0.22 NG/ML
TROPONIN I SERPL-MCNC: 0.51 NG/ML
UROBILINOGEN UR QL STRIP.AUTO: 0.2 E.U./DL
WBC # BLD AUTO: 5.83 THOUSAND/UL (ref 4.31–10.16)
WBC #/AREA URNS AUTO: ABNORMAL /HPF

## 2017-09-23 PROCEDURE — 76776 US EXAM K TRANSPL W/DOPPLER: CPT

## 2017-09-23 PROCEDURE — 71010 HB CHEST X-RAY 1 VIEW FRONTAL (PORTABLE): CPT

## 2017-09-23 PROCEDURE — 96365 THER/PROPH/DIAG IV INF INIT: CPT

## 2017-09-23 PROCEDURE — 82948 REAGENT STRIP/BLOOD GLUCOSE: CPT

## 2017-09-23 PROCEDURE — 82800 BLOOD PH: CPT | Performed by: EMERGENCY MEDICINE

## 2017-09-23 PROCEDURE — 82805 BLOOD GASES W/O2 SATURATION: CPT | Performed by: EMERGENCY MEDICINE

## 2017-09-23 PROCEDURE — 83690 ASSAY OF LIPASE: CPT | Performed by: EMERGENCY MEDICINE

## 2017-09-23 PROCEDURE — 96361 HYDRATE IV INFUSION ADD-ON: CPT

## 2017-09-23 PROCEDURE — 83605 ASSAY OF LACTIC ACID: CPT | Performed by: EMERGENCY MEDICINE

## 2017-09-23 PROCEDURE — 82805 BLOOD GASES W/O2 SATURATION: CPT | Performed by: FAMILY MEDICINE

## 2017-09-23 PROCEDURE — 85610 PROTHROMBIN TIME: CPT | Performed by: EMERGENCY MEDICINE

## 2017-09-23 PROCEDURE — 82570 ASSAY OF URINE CREATININE: CPT | Performed by: FAMILY MEDICINE

## 2017-09-23 PROCEDURE — 84484 ASSAY OF TROPONIN QUANT: CPT | Performed by: FAMILY MEDICINE

## 2017-09-23 PROCEDURE — 36415 COLL VENOUS BLD VENIPUNCTURE: CPT | Performed by: EMERGENCY MEDICINE

## 2017-09-23 PROCEDURE — 84300 ASSAY OF URINE SODIUM: CPT | Performed by: FAMILY MEDICINE

## 2017-09-23 PROCEDURE — 93005 ELECTROCARDIOGRAM TRACING: CPT | Performed by: EMERGENCY MEDICINE

## 2017-09-23 PROCEDURE — 96375 TX/PRO/DX INJ NEW DRUG ADDON: CPT

## 2017-09-23 PROCEDURE — 36600 WITHDRAWAL OF ARTERIAL BLOOD: CPT

## 2017-09-23 PROCEDURE — 99285 EMERGENCY DEPT VISIT HI MDM: CPT

## 2017-09-23 PROCEDURE — 87040 BLOOD CULTURE FOR BACTERIA: CPT | Performed by: EMERGENCY MEDICINE

## 2017-09-23 PROCEDURE — 87633 RESP VIRUS 12-25 TARGETS: CPT | Performed by: INTERNAL MEDICINE

## 2017-09-23 PROCEDURE — 85025 COMPLETE CBC W/AUTO DIFF WBC: CPT | Performed by: EMERGENCY MEDICINE

## 2017-09-23 PROCEDURE — 81001 URINALYSIS AUTO W/SCOPE: CPT | Performed by: EMERGENCY MEDICINE

## 2017-09-23 PROCEDURE — 82009 KETONE BODYS QUAL: CPT | Performed by: EMERGENCY MEDICINE

## 2017-09-23 PROCEDURE — 84100 ASSAY OF PHOSPHORUS: CPT | Performed by: FAMILY MEDICINE

## 2017-09-23 PROCEDURE — 83880 ASSAY OF NATRIURETIC PEPTIDE: CPT | Performed by: EMERGENCY MEDICINE

## 2017-09-23 PROCEDURE — 80197 ASSAY OF TACROLIMUS: CPT | Performed by: FAMILY MEDICINE

## 2017-09-23 PROCEDURE — 80053 COMPREHEN METABOLIC PANEL: CPT | Performed by: EMERGENCY MEDICINE

## 2017-09-23 PROCEDURE — 84484 ASSAY OF TROPONIN QUANT: CPT | Performed by: EMERGENCY MEDICINE

## 2017-09-23 PROCEDURE — 80048 BASIC METABOLIC PNL TOTAL CA: CPT | Performed by: FAMILY MEDICINE

## 2017-09-23 PROCEDURE — 83735 ASSAY OF MAGNESIUM: CPT | Performed by: FAMILY MEDICINE

## 2017-09-23 PROCEDURE — 83735 ASSAY OF MAGNESIUM: CPT | Performed by: EMERGENCY MEDICINE

## 2017-09-23 PROCEDURE — 85730 THROMBOPLASTIN TIME PARTIAL: CPT | Performed by: EMERGENCY MEDICINE

## 2017-09-23 PROCEDURE — 87081 CULTURE SCREEN ONLY: CPT | Performed by: INTERNAL MEDICINE

## 2017-09-23 RX ORDER — TACROLIMUS 0.5 MG/1
0.5 CAPSULE ORAL 2 TIMES DAILY
Status: DISCONTINUED | OUTPATIENT
Start: 2017-09-23 | End: 2017-09-25 | Stop reason: HOSPADM

## 2017-09-23 RX ORDER — ONDANSETRON 2 MG/ML
4 INJECTION INTRAMUSCULAR; INTRAVENOUS ONCE
Status: COMPLETED | OUTPATIENT
Start: 2017-09-23 | End: 2017-09-23

## 2017-09-23 RX ORDER — SODIUM CHLORIDE 9 MG/ML
250 INJECTION, SOLUTION INTRAVENOUS CONTINUOUS
Status: DISPENSED | OUTPATIENT
Start: 2017-09-23 | End: 2017-09-23

## 2017-09-23 RX ORDER — DEXTROSE AND SODIUM CHLORIDE 5; .9 G/100ML; G/100ML
100 INJECTION, SOLUTION INTRAVENOUS CONTINUOUS
Status: DISCONTINUED | OUTPATIENT
Start: 2017-09-23 | End: 2017-09-23

## 2017-09-23 RX ORDER — SODIUM CHLORIDE 9 MG/ML
500 INJECTION, SOLUTION INTRAVENOUS CONTINUOUS
Status: DISPENSED | OUTPATIENT
Start: 2017-09-23 | End: 2017-09-23

## 2017-09-23 RX ORDER — 0.9 % SODIUM CHLORIDE 0.9 %
3 VIAL (ML) INJECTION AS NEEDED
Status: DISCONTINUED | OUTPATIENT
Start: 2017-09-23 | End: 2017-09-23 | Stop reason: HOSPADM

## 2017-09-23 RX ORDER — FENTANYL CITRATE 50 UG/ML
25 INJECTION, SOLUTION INTRAMUSCULAR; INTRAVENOUS ONCE
Status: COMPLETED | OUTPATIENT
Start: 2017-09-23 | End: 2017-09-23

## 2017-09-23 RX ORDER — POTASSIUM CHLORIDE 14.9 MG/ML
20 INJECTION INTRAVENOUS
Status: COMPLETED | OUTPATIENT
Start: 2017-09-23 | End: 2017-09-23

## 2017-09-23 RX ORDER — CEPHALEXIN 250 MG/1
CAPSULE ORAL
COMMUNITY
Start: 2017-07-12 | End: 2017-09-25 | Stop reason: HOSPADM

## 2017-09-23 RX ORDER — DEXTROSE AND SODIUM CHLORIDE 5; .9 G/100ML; G/100ML
250 INJECTION, SOLUTION INTRAVENOUS CONTINUOUS
Status: DISCONTINUED | OUTPATIENT
Start: 2017-09-23 | End: 2017-09-24

## 2017-09-23 RX ORDER — MYCOPHENOLATE MOFETIL 250 MG/1
750 CAPSULE ORAL 2 TIMES DAILY
Status: DISCONTINUED | OUTPATIENT
Start: 2017-09-23 | End: 2017-09-23

## 2017-09-23 RX ORDER — SODIUM CHLORIDE 9 MG/ML
1000 INJECTION, SOLUTION INTRAVENOUS CONTINUOUS
Refills: 0
Start: 2017-09-23 | End: 2017-10-26

## 2017-09-23 RX ORDER — ATENOLOL 25 MG/1
25 TABLET ORAL DAILY
Status: DISCONTINUED | OUTPATIENT
Start: 2017-09-23 | End: 2017-09-25 | Stop reason: HOSPADM

## 2017-09-23 RX ORDER — MAGNESIUM SULFATE HEPTAHYDRATE 40 MG/ML
2 INJECTION, SOLUTION INTRAVENOUS ONCE
Status: COMPLETED | OUTPATIENT
Start: 2017-09-23 | End: 2017-09-23

## 2017-09-23 RX ORDER — PREDNISONE 1 MG/1
5 TABLET ORAL DAILY
Status: DISCONTINUED | OUTPATIENT
Start: 2017-09-23 | End: 2017-09-23

## 2017-09-23 RX ORDER — SODIUM CHLORIDE 9 MG/ML
1000 INJECTION, SOLUTION INTRAVENOUS CONTINUOUS
Status: DISPENSED | OUTPATIENT
Start: 2017-09-23 | End: 2017-09-23

## 2017-09-23 RX ORDER — DEXTROSE AND SODIUM CHLORIDE 5; .9 G/100ML; G/100ML
250 INJECTION, SOLUTION INTRAVENOUS CONTINUOUS
Qty: 250 ML | Refills: 0 | Status: SHIPPED | OUTPATIENT
Start: 2017-09-23 | End: 2017-10-26

## 2017-09-23 RX ADMIN — INSULIN HUMAN 4 UNITS: 100 INJECTION, SOLUTION PARENTERAL at 05:55

## 2017-09-23 RX ADMIN — POTASSIUM CHLORIDE 20 MEQ: 200 INJECTION, SOLUTION INTRAVENOUS at 10:01

## 2017-09-23 RX ADMIN — TACROLIMUS 0.5 MG: 0.5 CAPSULE ORAL at 17:18

## 2017-09-23 RX ADMIN — DEXTROSE AND SODIUM CHLORIDE 250 ML/HR: 5; .9 INJECTION, SOLUTION INTRAVENOUS at 23:19

## 2017-09-23 RX ADMIN — DEXTROSE AND SODIUM CHLORIDE 250 ML/HR: 5; .9 INJECTION, SOLUTION INTRAVENOUS at 10:03

## 2017-09-23 RX ADMIN — SODIUM CHLORIDE 1000 ML: 0.9 INJECTION, SOLUTION INTRAVENOUS at 16:28

## 2017-09-23 RX ADMIN — SODIUM CHLORIDE 1455 ML: 0.9 INJECTION, SOLUTION INTRAVENOUS at 05:23

## 2017-09-23 RX ADMIN — ONDANSETRON 4 MG: 2 INJECTION INTRAMUSCULAR; INTRAVENOUS at 05:22

## 2017-09-23 RX ADMIN — TACROLIMUS 0.5 MG: 0.5 CAPSULE ORAL at 11:36

## 2017-09-23 RX ADMIN — CEFEPIME HYDROCHLORIDE 2000 MG: 2 INJECTION, SOLUTION INTRAVENOUS at 07:22

## 2017-09-23 RX ADMIN — FENTANYL CITRATE 25 MCG: 50 INJECTION INTRAMUSCULAR; INTRAVENOUS at 06:46

## 2017-09-23 RX ADMIN — DEXTROSE AND SODIUM CHLORIDE 250 ML/HR: 5; .9 INJECTION, SOLUTION INTRAVENOUS at 14:08

## 2017-09-23 RX ADMIN — SODIUM BICARBONATE 100 ML/HR: 84 INJECTION INTRAVENOUS at 20:33

## 2017-09-23 RX ADMIN — METRONIDAZOLE 500 MG: 500 INJECTION, SOLUTION INTRAVENOUS at 09:24

## 2017-09-23 RX ADMIN — SODIUM CHLORIDE 1000 ML: 0.9 INJECTION, SOLUTION INTRAVENOUS at 08:20

## 2017-09-23 RX ADMIN — HYDROCORTISONE SODIUM SUCCINATE 100 MG: 100 INJECTION, POWDER, FOR SOLUTION INTRAMUSCULAR; INTRAVENOUS at 11:53

## 2017-09-23 RX ADMIN — MAGNESIUM SULFATE HEPTAHYDRATE 2 G: 40 INJECTION, SOLUTION INTRAVENOUS at 06:24

## 2017-09-23 RX ADMIN — SODIUM CHLORIDE 500 ML: 0.9 INJECTION, SOLUTION INTRAVENOUS at 07:34

## 2017-09-23 RX ADMIN — HYDROCORTISONE SODIUM SUCCINATE 100 MG: 100 INJECTION, POWDER, FOR SOLUTION INTRAMUSCULAR; INTRAVENOUS at 21:38

## 2017-09-23 RX ADMIN — SODIUM CHLORIDE 4 UNITS/HR: 9 INJECTION, SOLUTION INTRAVENOUS at 05:56

## 2017-09-23 RX ADMIN — METRONIDAZOLE 500 MG: 500 INJECTION, SOLUTION INTRAVENOUS at 16:27

## 2017-09-23 RX ADMIN — POTASSIUM CHLORIDE 20 MEQ: 200 INJECTION, SOLUTION INTRAVENOUS at 11:59

## 2017-09-23 RX ADMIN — VANCOMYCIN HYDROCHLORIDE 1000 MG: 5 INJECTION, POWDER, LYOPHILIZED, FOR SOLUTION INTRAVENOUS at 13:13

## 2017-09-23 RX ADMIN — PREDNISONE 5 MG: 5 TABLET ORAL at 09:24

## 2017-09-23 RX ADMIN — DEXTROSE AND SODIUM CHLORIDE 250 ML/HR: 5; .9 INJECTION, SOLUTION INTRAVENOUS at 19:13

## 2017-09-23 NOTE — ED PROVIDER NOTES
History  Chief Complaint   Patient presents with    Shortness of Breath     Pt c/o feeling SOB and nauseated since thursday - also states BS @ 0405 was 411mg/dL - took 5u Lantus and 10u Humalog at that time     This patient has been feeling sick since 2 days ago  He is having nausea and shortness of breath  Blood sugar this morning was over 400, for which he took Lantus and Humalog  He is having upper back pain which is chronic  He denies fever  He has minimal cough  He denies abdominal pain  History provided by:  Patient  Nausea   The primary symptoms include nausea  Primary symptoms do not include fever  The illness began 2 days ago  The onset was gradual  The problem has been gradually worsening  The illness does not include chills or constipation  Prior to Admission Medications   Prescriptions Last Dose Informant Patient Reported? Taking?    Mycophenolate Mofetil (CELLCEPT PO) 9/22/2017 at Unknown time  Yes Yes   Sig: Take 750 mg by mouth 2 (two) times a day Take 3 caps    Tacrolimus (PROGRAF PO) 9/22/2017 at Unknown time  Yes Yes   Sig: Take 0 5 mg by mouth 2 (two) times a day Take 1 5 tabs    atenolol (TENORMIN) 25 mg tablet 9/22/2017 at Unknown time  Yes Yes   Sig: Take 25 mg by mouth daily   cephalexin (KEFLEX) 250 mg capsule 9/22/2017 at Unknown time  Yes Yes   Sig: Take by mouth   insulin detemir (LEVEMIR) 100 units/mL subcutaneous injection   No No   Sig: Inject 10 Units under the skin every 12 (twelve) hours for 30 days   insulin detemir (LEVEMIR) 100 units/mL subcutaneous injection 9/22/2017 at Unknown time  Yes Yes   Sig: Inject 10 Units under the skin daily at bedtime   insulin lispro (HumaLOG) 100 units/mL injection 9/23/2017 at Unknown time  Yes Yes   Sig: Inject 1-6 Units under the skin 3 (three) times a day before meals   multivitamin (THERAGRAN) TABS 9/22/2017 at Unknown time  Yes Yes   Sig: Take 1 tablet by mouth daily   oxyCODONE (ROXICODONE) 5 mg immediate release tablet Unknown at Unknown time  No No   Sig: Take 1 tablet by mouth every 6 (six) hours as needed for moderate pain for up to 20 doses Max Daily Amount: 20 mg   oxyCODONE-acetaminophen (PERCOCET) 5-325 mg per tablet   No No   Sig: Take 1 tablet by mouth every 4 (four) hours as needed for moderate pain for up to 30 doses Max Daily Amount: 6 tablets   predniSONE 5 mg tablet 9/22/2017 at Unknown time  Yes Yes   Sig: Take 5 mg by mouth daily   sodium bicarbonate 650 mg tablet Unknown at Unknown time  No No   Sig: Take 2 tablets by mouth 2 (two) times daily after meals      Facility-Administered Medications: None       Past Medical History:   Diagnosis Date    Diabetes mellitus     Diabetes mellitus type 1     Hypertension     Renal failure     Renal transplant, status post 07/21/2007       Past Surgical History:   Procedure Laterality Date    ANKLE FRACTURE SURGERY      ANKLE HARDWARE REMOVAL Right 7/31/2017    Procedure: REMOVAL HARDWARE ANKLE;  Surgeon: Monty Varela MD;  Location: [facility];  Service: Orthopedics    ANKLE HARDWARE REMOVAL Right 8/17/2017    Procedure: TIBIA FAILED HARDWARE REMOVAL;  Surgeon: Monty Varela MD;  Location: [facility];  Service: Orthopedics    CLOSED REDUCTION ANKLE Right 7/3/2017    Procedure: CLOSED REDUCTION DISTAL TIB-FIB AND CASTING VS;  Surgeon: Monty Varela MD;  Location: [facility];  Service: Orthopedics    EYE SURGERY      FRACTURE SURGERY      ORIF Rt Ankle    GLUTAMIC ACID DECARBOXYLASE (HISTORICAL)      NEPHRECTOMY TRANSPLANTED ORGAN      DC OPEN TREATMENT FRACTURE DISTAL TIBIA FIBULA Right 7/3/2017    Procedure: OPEN REDUCTION W/ INTERNAL FIXATION (ORIF);   Surgeon: Monty Varela MD;  [facility]: [facility];  Service: Orthopedics    TOE AMPUTATION Right 10/27/2016    Procedure: AMPUTATION TOE;  Surgeon: Demetra Fragoso DPM;  Location: [facility];  Service:        Family History   Problem Relation Age of Onset    Diabetes Brother      I have reviewed and agree with the history as documented  Social History   Substance Use Topics    Smoking status: Never Smoker    Smokeless tobacco: Never Used    Alcohol use No        Review of Systems   Constitutional: Negative  Negative for chills and fever  HENT: Negative  Negative for trouble swallowing and voice change  Cardiovascular: Negative  Negative for palpitations  Gastrointestinal: Positive for nausea  Negative for constipation  Endocrine: Negative  Genitourinary: Negative  Musculoskeletal: Negative  Skin: Negative  Neurological: Negative  Hematological: Negative  Physical Exam  ED Triage Vitals   Temperature Pulse Respirations Blood Pressure SpO2   09/23/17 0450 09/23/17 0450 09/23/17 0450 09/23/17 0450 09/23/17 0450   (!) 96 1 °F (35 6 °C) 84 (!) 24 (!) 89/50 100 %      Temp Source Heart Rate Source Patient Position - Orthostatic VS BP Location FiO2 (%)   09/23/17 0450 09/23/17 0450 09/23/17 0450 09/23/17 0450 09/25/17 0200   Temporal Monitor Sitting Left arm 80      Pain Score       09/23/17 0450       No Pain           Physical Exam   Constitutional: He is cooperative  He appears ill  HENT:   voice normal   managing secretions  Eyes: Conjunctivae and EOM are normal    Cardiovascular: Normal rate and regular rhythm  Pulmonary/Chest: Effort normal  Tachypnea noted  No respiratory distress  coarse breath sounds bilaterally   Abdominal: Soft  Normal appearance  Neurological: He is alert  GCS eye subscore is 4  GCS verbal subscore is 5  GCS motor subscore is 6  Skin: He is not diaphoretic  There is pallor  Psychiatric: He has a normal mood and affect  His speech is normal and behavior is normal  He is attentive  Nursing note and vitals reviewed        ED Medications  Medications   sodium chloride 0 9 % infusion (0 mL/hr Intravenous Stopped 9/23/17 1004)     Followed by   sodium chloride 0 9 % infusion (0 mL/hr Intravenous Stopped 9/23/17 1005)     Followed by   sodium chloride 0 9 % infusion (not administered)   ondansetron (ZOFRAN) injection 4 mg (4 mg Intravenous Given 9/23/17 0522)   sodium chloride 0 9 % bolus 1,455 mL (0 mL Intravenous Stopped 9/23/17 0817)   insulin regular (HumuLIN R,NovoLIN R) injection 4 Units (4 Units Intravenous Given 9/23/17 0555)   magnesium sulfate 2 g/50 mL IVPB (premix) 2 g (0 g Intravenous Stopped 9/23/17 0803)   fentanyl citrate (PF) 100 MCG/2ML 25 mcg (25 mcg Intravenous Given 9/23/17 0646)   cefepime (MAXIPIME) IVPB (premix) 2,000 mg (0 mg Intravenous Stopped 9/23/17 0804)   sodium chloride 0 9 % bolus 1,000 mL (0 mL Intravenous Stopped 9/23/17 1002)   potassium chloride 20 mEq IVPB (premix) (0 mEq Intravenous Stopped 9/23/17 1453)   sodium chloride 0 9 % bolus 1,000 mL (0 mL Intravenous Stopped 9/23/17 1801)   magnesium sulfate 2 g/50 mL IVPB (premix) 2 g (0 g Intravenous Stopped 9/24/17 1433)   furosemide (LASIX) injection 40 mg (40 mg Intravenous Given 9/24/17 0926)   phytonadione (AQUA-MEPHYTON) 10 mg/mL SC/IM injection 10 mg (10 mg Subcutaneous Given 9/24/17 1004)   potassium chloride 20 mEq IVPB (premix) (0 mEq Intravenous Stopped 9/24/17 2015)   dextrose 50 % IV solution 25 mL (25 mL Intravenous Given 9/24/17 1645)   dextrose 50 % IV solution **AcuDose Override Pull** (50 mL Intravenous Given 9/24/17 1646)   dextrose 50 % IV solution 25 mL (25 mL Intravenous Given 9/24/17 1729)   dextrose 50 % IV solution 25 mL (25 mL Intravenous Given 9/24/17 1733)   dextrose 50 % IV solution 50 mL (50 mL Intravenous Given 9/24/17 1937)   midazolam (VERSED) injection 5 mg (5 mg Intravenous Given 9/25/17 0308)   furosemide (LASIX) injection 40 mg (40 mg Intravenous Given 9/25/17 0847)   sodium bicarbonate 8 4 % injection 50 mEq (50 mEq Intravenous Given 9/25/17 0956)       Diagnostic Studies  Labs Reviewed   CBC AND DIFFERENTIAL - Abnormal        Result Value Ref Range Status    RBC 3 64 (*) 3 88 - 5 62 Million/uL Final    Hemoglobin 9 5 (*) 12 0 - 17 0 g/dL Final    Hematocrit 29 8 (*) 36 5 - 49 3 % Final    MCH 26 1 (*) 26 8 - 34 3 pg Final    RDW 17 7 (*) 11 6 - 15 1 % Final    Platelets 081 (*) 841 - 390 Thousands/uL Final    Lymphocytes Relative 11 (*) 14 - 44 % Final    Monocytes Relative 17 (*) 4 - 12 % Final    WBC 5 83  4 31 - 10 16 Thousand/uL Final    MCV 82  82 - 98 fL Final    MCHC 31 9  31 4 - 37 4 g/dL Final    MPV 9 9  8 9 - 12 7 fL Final    Neutrophils Relative 72  43 - 75 % Final    Eosinophils Relative 0  0 - 6 % Final    Basophils Relative 0  0 - 1 % Final    Neutrophils Absolute 4 19  1 85 - 7 62 Thousands/µL Final    Lymphocytes Absolute 0 65  0 60 - 4 47 Thousands/µL Final    Monocytes Absolute 0 98  0 17 - 1 22 Thousand/µL Final    Eosinophils Absolute 0 00  0 00 - 0 61 Thousand/µL Final    Basophils Absolute 0 01  0 00 - 0 10 Thousands/µL Final   COMPREHENSIVE METABOLIC PANEL - Abnormal     Sodium 131 (*) 136 - 145 mmol/L Final    CO2 <5 (*) 21 - 32 mmol/L Final    Comment: 3    BUN 85 (*) 5 - 25 mg/dL Final    Creatinine 4 77 (*) 0 60 - 1 30 mg/dL Final    Comment: Standardized to IDMS reference method    Glucose 349 (*) 65 - 140 mg/dL Final    Comment:   If the patient is fasting, the ADA then defines impaired fasting glucose as > 100 mg/dL and diabetes as > or equal to 123 mg/dL  Specimen collection should occur prior to Sulfasalazine administration due to the potential for falsely depressed results  Specimen collection should occur prior to Sulfapyridine administration due to the potential for falsely elevated results      Calcium 7 3 (*) 8 3 - 10 1 mg/dL Final    Alkaline Phosphatase 289 (*) 46 - 116 U/L Final    Albumin 3 2 (*) 3 5 - 5 0 g/dL Final    Potassium 4 5  3 5 - 5 3 mmol/L Final    Chloride 102  100 - 108 mmol/L Final    Anion Gap    4 - 13 mmol/L Final    Comment: Unable to calculate    AST 24  5 - 45 U/L Final    Comment:   Specimen collection should occur prior to Sulfasalazine administration due to the potential for falsely depressed results  ALT 22  12 - 78 U/L Final    Comment:   Specimen collection should occur prior to Sulfasalazine administration due to the potential for falsely depressed results  Total Protein 7 0  6 4 - 8 2 g/dL Final    Total Bilirubin 0 30  0 20 - 1 00 mg/dL Final    eGFR 13  ml/min/1 73sq m Final    Narrative:     National Kidney Disease Education Program recommendations are as follows:  GFR calculation is accurate only with a steady state creatinine  Chronic Kidney disease less than 60 ml/min/1 73 sq  meters  Kidney failure less than 15 ml/min/1 73 sq  meters  MAGNESIUM - Abnormal     Magnesium 1 2 (*) 1 6 - 2 6 mg/dL Final   PROTIME-INR - Abnormal     Protime 38 0 (*) 12 1 - 14 4 seconds Final    INR 3 83 (*) 0 86 - 1 16 Final   APTT - Abnormal     PTT 79 (*) 23 - 35 seconds Final    Narrative: Therapeutic Heparin Range = 60-90 seconds   TROPONIN I - Abnormal     Troponin I 0 05 (*) <=0 04 ng/mL Final    Narrative:     [employer] Chemistry analyzer 99% cutoff is > 0 04 ng/mL in network labs    o cTnI 99% cutoff is useful only when applied to patients in the clinical setting of myocardial ischemia  o cTnI 99% cutoff should be interpreted in the context of clinical history, ECG findings and possibly cardiac imaging to establish correct diagnosis  o cTnI 99% cutoff may be suggestive but clearly not indicative of a coronary event without the clinical setting of myocardial ischemia     PH, VENOUS - Abnormal     pH, Mio 6 893 (*) 7 300 - 7 400 Final   NT-BNP PRO (BRAIN NATRIURETIC PEPTIDE) - Abnormal     NT-proBNP 26,480 (*) <125 pg/mL Final   ACETONE - Abnormal     Acetone, Bld 1+ (*) Negative Final   BLOOD GAS, ARTERIAL - Abnormal     pH, Arterial 6 957 (*) 7 350 - 7 450 Final    pCO2, Arterial 12 6 (*) 36 0 - 44 0 mm Hg Final    HCO3, Arterial 2 8 (*) 22 0 - 28 0 mmol/L Final    O2 Content, Arterial 13 4 (*) 16 0 - 23 0 mL/dL Final    O2 HGB,Arterial  91 6 (*) 94 0 - 97 0 % Final    pO2, Arterial 80 4  75 0 - 129 0 mm Hg Final    Base Excess, Arterial -27 4  mmol/L Final    SOURCE Radial, Right   Final    AUBREY TEST Yes   Final    ROOM AIR FIO2 21  % Final   POCT GLUCOSE - Abnormal     POC Glucose 326 (*) 65 - 140 mg/dl Final   POCT GLUCOSE - Abnormal     POC Glucose 270 (*) 65 - 140 mg/dl Final   LACTIC ACID, PLASMA - Normal    LACTIC ACID 1 2  0 5 - 2 0 mmol/L Final    Narrative:     Result may be elevated if tourniquet was used during collection  LIPASE - Normal    Lipase 104  73 - 393 u/L Final       XR chest portable   Final Result      Asymmetric pulmonary edema (right greater than left  Superimposed infiltrates not excludable  Lines and tubes as described above  No pneumothorax            Workstation performed: [id number]         XR chest portable   Final Result      ET tube 1 5 cm above the james  Asymmetric pulmonary edema            Workstation performed: [id number]         XR chest portable   Final Result      Hazy right lung base infiltrate  The possibility of developing pneumonia is not excluded  Recommend continued follow-up  ##[email or web address]##imslh         Workstation performed: [id number]         US transplant kidney with doppler   Final Result      No hydronephrosis  Renal artery and vein patent  Limited as above  See above for further details  Probable ascites  Workstation performed: [id number]         XR chest 1 view portable   ED Interpretation   No focal infiltrate  NAD  Final Result      No active pulmonary disease           Workstation performed: [id number]             Procedures  Procedures      Phone Contacts  ED Phone Contact    ED Course  ED Course as of Sep 28 1403   Sat Sep 23, 2017   0526 Hyperglycemic and Acidotic - insulin drip pH, Mio: (!) 6 893   2356 With EKG changes c/w hypokalemia, confirmed potassium before insulin bolus Potassium: 4 5   0549 Acute kidney injury - prev Cr 1 8 Creatinine: (!) 4 77   0602 Dr Owen Maldonado plans bicarb drip but not until arrival on the floor  pH, Arterial: (!!) 6 957   0602 Repleting Magnesium: (!) 1 2   0603 LACTIC ACID: 1 2   0613 SBP 99 at present, which is improved  HEART Risk Score    Flowsheet Row Most Recent Value   History  0 Filed at: 09/23/2017 0521   ECG  1 Filed at: 09/23/2017 0521   Age  1 Filed at: 09/23/2017 0521   Risk Factors  1 Filed at: 09/23/2017 0521   Troponin  0 Filed at: 09/23/2017 0521   Heart Score Risk Calculator   History  0 Filed at: 09/23/2017 0521   ECG  1 Filed at: 09/23/2017 0521   Age  1 Filed at: 09/23/2017 0521   Risk Factors  1 Filed at: 09/23/2017 0521   Troponin  0 Filed at: 09/23/2017 0521   HEART Score  3 Filed at: 09/23/2017 0521   HEART Score  3 Filed at: 09/23/2017 1384                      Initial Sepsis Screening     Row Name 09/23/17 7553                Is the patient's history suggestive of a new or worsening infection? No  -DA        Suspected source of infection          Are two or more of the following signs & symptoms of infection both present and new to the patient?         Indicate SIRS criteria Tachypnea > 20 resp per min  -DA        If the answer is yes to both questions, suspicion of sepsis is present          If severe sepsis is present AND tissue hypoperfusion perists in the hour after fluid resuscitation or lactate > 4, the patient meets criteria for SEPTIC SHOCK          Are any of the following organ dysfunction criteria present within 6 hours of suspected infection and SIRS criteria that are NOT considered to be chronic conditions?         Organ dysfunction          Date of presentation of severe sepsis          Time of presentation of severe sepsis          Tissue hypoperfusion persists in the hour after crystalloid fluid administration, evidenced, by either:          Was hypotension present within one hour of the conclusion of crystalloid fluid administration?           Date of presentation of septic shock   Time of presentation of septic shock            User Key  (r) = Recorded By, (t) = Taken By, (c) = Cosigned By    [address] Name Provider Type    MARINO Monge MD Physician                  MDM  Number of Diagnoses or Management Options     Amount and/or Complexity of Data Reviewed  Tests in the radiology section of CPT®: ordered and reviewed  Tests in the medicine section of CPT®: ordered and reviewed  Decide to obtain previous medical records or to obtain history from someone other than the patient: yes  Discuss the patient with other providers: yes  Independent visualization of images, tracings, or specimens: yes    Risk of Complications, Morbidity, and/or Mortality  Presenting problems: high      The patient presented with a condition in which there was a high probability of imminent or life-threatening deterioration, and critical care services (excluding separately billable procedures) totalled 30-74 minutes  Disposition  Final diagnoses:   Diabetic ketoacidosis without coma associated with type 1 diabetes mellitus   Acute renal failure     ED Disposition     ED Disposition Condition Comment    Admit  Case was discussed with YINKA Da Silva for [facility]   and the patient's admission status was agreed to be Admission Status: inpatient status to the service of YINKA Hensley , for SLIM             MD Documentation    [facility] Name, [address] [facility] [place] by [facility] and Unit #)  [facility] Name, Replaced by [facility] Assignment  5   Transport Mode  Helicopter   Transported by [facility] and Unit #)  Georges Contreras   Level of Care  Paramedic   Copies of Medical Records Sent  History and Physical, Progress note, Orders, Transfer form, Nursing note, Radiology, Labs   Patient Belongings Disposition  Sent with family   Transfer Date  09/25/17 Transfer Time  1050      Follow-up Information    None       Discharge Medication List as of 9/25/2017 12:07 PM      START taking these medications    Details   cefepime 2,000 mg in dextrose 5 % 50 mL IVPB Infuse 2,000 mg into a venous catheter every 24 hours for 10 days, Starting Sat 9/23/2017, Until Tue 10/3/2017, No Print      Dextrose-Sodium Chloride (DEXTROSE 5 % AND SODIUM CHLORIDE 0 9 %) 5-0 9 percent Infuse 250 mL/hr into a venous catheter continuous, Starting Sat 9/23/2017, Print      hydrocortisone sodium succinate, PF, (Solu-CORTEF) 100 mg SOLR Infuse 2 mL into a venous catheter every 8 (eight) hours, Starting Sat 9/23/2017, No Print      insulin regular (HumuLIN R,NovoLIN R) infusion Infuse 2 Units/hr into a venous catheter continuous for 10 days, Starting Sat 9/23/2017, Until Tue 10/3/2017, No Print      metroNIDAZOLE (FLAGYL) 500 mg Infuse 100 mL into a venous catheter every 8 (eight) hours for 10 days, Starting Sat 9/23/2017, Until Tue 10/3/2017, Print      sodium bicarbonate infusion Infuse 100 mL/hr into a venous catheter continuous for 10 days, Starting Sat 9/23/2017, Until Tue 10/3/2017, No Print      sodium chloride Infuse 1,000 mL/hr into a venous catheter continuous, Starting Sat 9/23/2017, No Print      vancomycin 1,000 mg in sodium chloride 0 9 % 250 mL IVPB Infuse 1,000 mg into a venous catheter every other day for 10 days, Starting Sat 9/23/2017, Until Tue 10/3/2017, No Print         STOP taking these medications       atenolol (TENORMIN) 25 mg tablet Comments:   Reason for Stopping:         cephalexin (KEFLEX) 250 mg capsule Comments:   Reason for Stopping:         insulin detemir (LEVEMIR) 100 units/mL subcutaneous injection Comments:   Reason for Stopping:         insulin lispro (HumaLOG) 100 units/mL injection Comments:   Reason for Stopping:         multivitamin (THERAGRAN) TABS Comments:   Reason for Stopping:         Mycophenolate Mofetil (CELLCEPT PO) Comments:   Reason for Stopping:         predniSONE 5 mg tablet Comments:   Reason for Stopping:         Tacrolimus (PROGRAF PO) Comments:   Reason for Stopping:         insulin detemir (LEVEMIR) 100 units/mL subcutaneous injection Comments:   Reason for Stopping:         oxyCODONE (ROXICODONE) 5 mg immediate release tablet Comments:   Reason for Stopping:         oxyCODONE-acetaminophen (PERCOCET) 5-325 mg per tablet Comments:   Reason for Stopping:         sodium bicarbonate 650 mg tablet Comments:   Reason for Stopping:               Outpatient Discharge Orders  Discharge patient         ED Provider  Electronically Signed by       Simón Alberto MD  09/23/17 1463       Simón Alberto MD  09/28/17 5898

## 2017-09-23 NOTE — H&P
H&P Exam - Beau Lauren 51 y o  male MRN: [id number]    Unit/Bed#: RM04 Encounter: [id number]    Assessment:    Patient Active Problem List   Diagnosis    Cellulitis    Osteomyelitis    Foot pain    Hypertension    Type 1 diabetes mellitus    Renal transplant, status post    Hyperkalemia    Sepsis    MIKE (acute kidney injury)    Osteomyelitis    Poor circulation    Closed fracture of distal end of right tibia with routine healing    Elevated platelet count    Pain from implanted hardware    Chronic kidney disease, stage IV (severe)    Chronic osteomyelitis of tibia    Immunosuppression    Hypertension    DKA (diabetic ketoacidoses)    Elevated troponin    Diarrhea    Elevated INR       Diabetic Ketoacidosis   Admit to CCU  Aggressive IVF   Initiate DKA Protocol   Initiate Bicarbonate drip @ 100cc/hr   BMP q4  Recheck ABG     Possible early sepsis 2/2 chronic right lower extremity osteomyelitis   Given patient's immunocompromised status will obtain blood cultures, initiate vanco / cefepime    Acute Renal Failure superimposed on CKD IV  Baseline Renal function is 1 5-1 8  Aggressive IVF   Monitor renal function  Urine sodium and Urine creatinine   Consult Nephrology     HypoMg  Repleted in ED  Recheck with labs     Diarrhea  Again given immunocompromised state will check stool culture and for C  Diff  Initiate Flagyl until C  Diff negative     Elevated Troponin  Trend x 3   2D echocardiogram   Fasting Lipid panel  A1c    History of Renal Transplant   Continue prograf / cellcept / Chronic prednisone     Elevated INR  Patient is not on anticoagulation  Will trend INR and follow LFT     LOS > 2 midnights  Full Code  Heparin for DVT Prophy       History of Present Illness      Patient is a pleasant 51year old male known to me from previous admission who presents today with a chief complaint of of nausea / vomiting / diarrhea   The patient is currently being treated as an outpatient for ankle cellulitis with keflex  He also has a 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  He states that the ankle was red / hot earlier in the week and his orthopedist elected to continue antibiotics for an additional 10 days  He had diarrhea earlier in the week that was non bloody and began improving yesterday     Review of Systems   Constitutional: Positive for activity change and chills  HENT: Negative  Eyes: Negative  Respiratory: Positive for shortness of breath  Cardiovascular: Negative  Gastrointestinal: Positive for diarrhea, nausea and vomiting  Endocrine: Negative  Genitourinary: Negative  Musculoskeletal: Negative  Skin: Positive for color change  Neurological: Negative  Hematological: Negative  Psychiatric/Behavioral: Negative  Historical Information   Past Medical History:   Diagnosis Date    Diabetes mellitus     Hypertension     Renal failure      Past Surgical History:   Procedure Laterality Date    ANKLE FRACTURE SURGERY      ANKLE HARDWARE REMOVAL Right 7/31/2017    Procedure: REMOVAL HARDWARE ANKLE;  Surgeon: Jarvis Turner MD;  [facility];  Service: Orthopedics    ANKLE HARDWARE REMOVAL Right 8/17/2017    Procedure: TIBIA FAILED HARDWARE REMOVAL;  Surgeon: Jarvis Turner MD;  [facility];  Service: Orthopedics    CLOSED REDUCTION ANKLE Right 7/3/2017    Procedure: CLOSED REDUCTION DISTAL TIB-FIB AND CASTING VS;  Surgeon: Jarvis Turner MD;  [facility];  Service: Orthopedics    EYE SURGERY      GLUTAMIC ACID DECARBOXYLASE (HISTORICAL)      NEPHRECTOMY TRANSPLANTED ORGAN      [place] OPEN TREATMENT FRACTURE DISTAL TIBIA FIBULA Right 7/3/2017    Procedure: OPEN REDUCTION W/ INTERNAL FIXATION (ORIF);   Surgeon: Jarvis Turner MD;  [facility];  Service: Orthopedics    TOE AMPUTATION Right 10/27/2016    Procedure: AMPUTATION TOE;  Surgeon: Willie Travis DPM;  [facility];  Service: Social History   History   Alcohol Use No     History   Drug Use No     History   Smoking Status    Never Smoker   Smokeless Tobacco    Never Used     Family History: non-contributory    Meds/Allergies   all medications and allergies reviewed  No Known Allergies    Objective   First Vitals:   Blood Pressure: (!) 89/50 (09/23/17 0450)  Pulse: 84 (09/23/17 0450)  Temperature: (!) 96 1 °F (35 6 °C) (09/23/17 0450)  Temp Source: Temporal (09/23/17 0450)  Respirations: (!) 24 (09/23/17 0450)  Height: 5' 4" (162 6 cm) (09/23/17 0450)  Weight - Scale: 48 5 kg (106 lb 14 8 oz) (09/23/17 0517)  SpO2: 100 % (09/23/17 0450)    Current Vitals:   Blood Pressure: (!) 89/50 (09/23/17 0450)  Pulse: 84 (09/23/17 0450)  Temperature: (!) 96 1 °F (35 6 °C) (09/23/17 0450)  Temp Source: Temporal (09/23/17 0450)  Respirations: (!) 24 (09/23/17 0450)  Height: 5' 4" (162 6 cm) (09/23/17 0450)  Weight - Scale: 48 5 kg (106 lb 14 8 oz) (09/23/17 0517)  SpO2: 100 % (09/23/17 0450)    No intake or output data in the 24 hours ending 09/23/17 0609    Invasive Devices     Peripheral Intravenous Line            Peripheral IV 09/23/17 Left Antecubital less than 1 day                Physical Exam   Constitutional: He is oriented to person, place, and time  He appears well-nourished  No distress  HENT:   Head: Normocephalic and atraumatic  Eyes: No scleral icterus  Neck: Neck supple  No JVD present  Cardiovascular: Normal rate and regular rhythm  Pulmonary/Chest: Effort normal and breath sounds normal  No respiratory distress  He has no wheezes  He has no rales  Abdominal: Soft  Bowel sounds are normal  He exhibits no distension  There is no tenderness  There is no rebound and no guarding  Musculoskeletal: He exhibits edema and tenderness  Decreased ROM of right ankle   tender to touch   Neurological: He is alert and oriented to person, place, and time  No cranial nerve deficit  Coordination normal    Skin: There is erythema  Mild to moderate surrounding erythema or right ankle  Tender to touch         Lab Results:   Lab Results   Component Value Date    WBC 5 83 09/23/2017    HGB 9 5 (L) 09/23/2017    HCT 29 8 (L) 09/23/2017    MCV 82 09/23/2017     (H) 09/23/2017     Lab Results   Component Value Date    GLUCOSE 349 (H) 09/23/2017    CALCIUM 7 3 (L) 09/23/2017     (L) 09/23/2017    K 4 5 09/23/2017    CO2 <5 (LL) 09/23/2017     09/23/2017    BUN 85 (H) 09/23/2017    CREATININE 4 77 (H) 09/23/2017       Imaging:   CXR   NAD    EKG, Pathology, and Other Studies: NSR prolonged QT     Code Status: Prior  Advance Directive and Living Will:      Power of :    POLST:      Counseling / Coordination of Care:

## 2017-09-23 NOTE — CONSULTS
Consultation - Nephrology   Kenya Hoang 51 y o  male MRN: [id number]  Unit/Bed#:  Encounter: [id number]    A/P:  1  MIKE on top of CKD due to volume depletion, sepsis, with possible Renal Transplant rejection   Follow up urine Na/Cr, continue with aggressive volume expansion  Reassess labs in a few hours  If there is not a continued improvement in parameters we will need to consider transferring the patient to a higher level of care  With respect to his transplant, only one RI is slightly elevated, we will keep this in mind as we progress over the course of the day  2  CKD III with baseline Cr ~1 5 mg/dL  3  Immunosuppression s/p Renal Transplant   As mentioned above, one RI is slightly elevated, it may be a possible error in reading due to Kussmaul breathing  Continue with tacrolimus, hold MMF for now, start hydrocortisone 100mg IV TID for potential adrenal insufficiency  5  Hypotension -multifactorial, volume depletion and adrenal insufficiency   Continue with volume expansion, start IV hydrocortisone  6  DKA    Continue with insulin gtt, patient now on D5NS due to serum glucose of about 200 g/dL  7  Osteomyelitis probable   We will need to defer MRI this morning due to critical state of affairs for this patient  Either way, at this time, no change in treatment on the MRI confirming for or against osteomyelitis  Thank you for allowing us to participate in the care of your patient  Please feel free to contact us regarding the care of this patient, or any other questions/concerns that may be applicable      Patient Active Problem List   Diagnosis    Osteomyelitis    Foot pain    Hypertension    Type 1 diabetes mellitus    Renal transplant, status post    Hyperkalemia    MIKE (acute kidney injury)    Osteomyelitis    Poor circulation    Closed fracture of distal end of right tibia with routine healing    Elevated platelet count    Chronic kidney disease, stage IV (severe)    Chronic osteomyelitis of tibia    Immunosuppression    Hypertension    DKA (diabetic ketoacidoses)    Elevated troponin    Diarrhea    Elevated INR    Cellulitis of ankle       History of Present Illness   Physician Requesting Consult: Freda Eisenmenger, MD  Reason for Consult / Principal Problem: MIKE/DDRTx from 2001  Hx and PE limited by:   HPI: Zo Mccall is a 51y o  year old male who presented to the ED with n-v-d which began about 12 hrs prior to presentation  He has been taking his medications as prescribed, including insulin, with no side-effects prior to this  Patient has been on cephalexin 250 mg TID for right foot infection, which is chronic  He has been on these medications for the past month or so  He is taking a probiotic daily  The patient was found to be in DKA and renal failure and was admitted into the ICU  From the renal standpoint, the patient presented with a Cr of 4 77 mg/dL, sodium was 131 mmol/L and his bicarb was undetectable  Patient was placed on volume resuscitation and insulin gtt  He has been given several boluses since admission  This morning's Cr, which is about 3 hrs apart from presenting Cr was slightly improved to 4 38 mg/dL, bicarb remains undetected, sodium slightly improved up to 135 mmol/L  From acid/base standpoint, pH was 6 957 with only a slight improvement to 7 028 at 9:30 AM   pCO2 appears to be properly compensated and no secondary disorder appears to be significantly impacting patient at this time  History obtained from chart review and the patient    Review of Systems - Negative except as mentioned above      Historical Information   Past Medical History:   Diagnosis Date    Diabetes mellitus     Diabetes mellitus type 1     Hypertension     Renal failure     Renal transplant, status post 07/21/2007     Past Surgical History:   Procedure Laterality Date    ANKLE FRACTURE SURGERY      ANKLE HARDWARE REMOVAL Right 7/31/2017    Procedure: REMOVAL HARDWARE ANKLE;  Surgeon: Deedee Quiroz MD;  [facility];  Service: Orthopedics    ANKLE HARDWARE REMOVAL Right 8/17/2017    Procedure: TIBIA FAILED HARDWARE REMOVAL;  Surgeon: Deedee Quiroz MD;  [facility];  Service: Orthopedics    CLOSED REDUCTION ANKLE Right 7/3/2017    Procedure: CLOSED REDUCTION DISTAL TIB-FIB AND CASTING VS;  Surgeon: Deedee Quiroz MD;  [facility];  Service: Orthopedics    EYE SURGERY      FRACTURE SURGERY      ORIF Rt Ankle    GLUTAMIC ACID DECARBOXYLASE (HISTORICAL)      NEPHRECTOMY TRANSPLANTED ORGAN      [place] OPEN TREATMENT FRACTURE DISTAL TIBIA FIBULA Right 7/3/2017    Procedure: OPEN REDUCTION W/ INTERNAL FIXATION (ORIF);   Surgeon: Deedee Quiroz MD;  [facility];  Service: Orthopedics    TOE AMPUTATION Right 10/27/2016    Procedure: AMPUTATION TOE;  Surgeon: Sofia Lyons DPM;  [facility];  Service:      Social History   History   Alcohol Use No     History   Drug Use No     History   Smoking Status    Never Smoker   Smokeless Tobacco    Never Used     Family History   Problem Relation Age of Onset    Diabetes Brother        Meds/Allergies   all current active meds have been reviewed, current meds: Current Facility-Administered Medications   Medication Dose Route Frequency    atenolol (TENORMIN) tablet 25 mg  25 mg Oral Daily    cefepime (MAXIPIME) 2,000 mg in dextrose 5 % 50 mL IVPB  2,000 mg Intravenous Q24H    dextrose 5 % and sodium chloride 0 9 % infusion  250 mL/hr Intravenous Continuous    insulin regular (HumuLIN R,NovoLIN R) 1 Units/mL in sodium chloride 0 9 % 100 mL infusion  2 Units/hr Intravenous Continuous    metroNIDAZOLE (FLAGYL) IVPB (premix) 500 mg  500 mg Intravenous Q8H    mycophenolate (CELLCEPT) capsule 750 mg  750 mg Oral BID    potassium chloride 20 mEq IVPB (premix)  20 mEq Intravenous Q2H    predniSONE tablet 5 mg  5 mg Oral Daily    sodium bicarbonate 150 mEq in dextrose 5 % 1,000 mL infusion  100 mL/hr Intravenous Continuous    sodium chloride 0 9 % infusion  500 mL/hr Intravenous Continuous    Followed by   August Frias sodium chloride 0 9 % infusion  250 mL/hr Intravenous Continuous    tacrolimus (PROGRAF) capsule 0 5 mg  0 5 mg Oral BID    vancomycin (VANCOCIN) 750 mg in sodium chloride 0 9 % 250 mL IVPB  15 mg/kg Intravenous Q24H    and PTA meds:  Prescriptions Prior to Admission   Medication    atenolol (TENORMIN) 25 mg tablet    cephalexin (KEFLEX) 250 mg capsule    insulin detemir (LEVEMIR) 100 units/mL subcutaneous injection    insulin lispro (HumaLOG) 100 units/mL injection    multivitamin (THERAGRAN) TABS    Mycophenolate Mofetil (CELLCEPT PO)    predniSONE 5 mg tablet    Tacrolimus (PROGRAF PO)    insulin detemir (LEVEMIR) 100 units/mL subcutaneous injection    oxyCODONE (ROXICODONE) 5 mg immediate release tablet    oxyCODONE-acetaminophen (PERCOCET) 5-325 mg per tablet    sodium bicarbonate 650 mg tablet         No Known Allergies    Objective     Intake/Output Summary (Last 24 hours) at 09/23/17 1057  Last data filed at 09/23/17 1002   Gross per 24 hour   Intake             2955 ml   Output                0 ml   Net             2955 ml       Invasive Devices:        Physical Exam  Vitals:    09/23/17 0925   BP: 102/54   Pulse: 76   Resp:    Temp:    SpO2:        Gen: in NAD, Alert/Awake  HEENT: no sclerous icterus, MMM, neck supple  CV: +S1/S2, RRR  Lungs: CTA bilaterally  Abd: +BS, soft NT/ND  Ext: all four extremities are warm  Skin: no rashes noted  Neuro: CN II-XII intact    Current Weight: Weight - Scale: 48 5 kg (106 lb 14 8 oz)  First Weight: Weight - Scale: 48 5 kg (106 lb 14 8 oz)    Lab Results:  I have personally reviewed pertinent labs    CBC: Lab Results   Component Value Date    WBC 5 83 09/23/2017    HGB 9 5 (L) 09/23/2017    HCT 29 8 (L) 09/23/2017    MCV 82 09/23/2017     (H) 09/23/2017    MCH 26 1 (L) 09/23/2017    MCHC 31 9 09/23/2017    RDW 17 7 (H) 09/23/2017    MPV 9 9 09/23/2017     CMP: Lab Results   Component Value Date     (L) 09/23/2017    K 3 7 09/23/2017     09/23/2017    CO2 <5 (LL) 09/23/2017    ANIONGAP  09/23/2017      Comment:      Unable to calculate    BUN 86 (H) 09/23/2017    CREATININE 4 38 (H) 09/23/2017    GLUCOSE 202 (H) 09/23/2017    CALCIUM 7 1 (L) 09/23/2017    AST 24 09/23/2017    ALT 22 09/23/2017    ALKPHOS 289 (H) 09/23/2017    PROT 7 0 09/23/2017    ALBUMIN 3 2 (L) 09/23/2017    BILITOT 0 30 09/23/2017    EGFR 15 09/23/2017     Phosphorus:   Lab Results   Component Value Date    PHOS 7 3 (H) 09/23/2017     Magnesium:   Lab Results   Component Value Date    MG 1 7 09/23/2017     Urinalysis: No results found for: COLORU, CLARITYU, SPECGRAV, PHUR, LEUKOCYTESUR, NITRITE, PROTEINUA, GLUCOSEU, KETONESU, BILIRUBINUR, BLOODU  Ionized Calcium: No results found for: CAION  Coagulation:   Lab Results   Component Value Date    INR 3 83 (H) 09/23/2017     Troponin:   Lab Results   Component Value Date    TROPONINI 0 22 (HH) 09/23/2017     ABG: Lab Results   Component Value Date    PHART 7 028 (LL) 09/23/2017    [id number] 14 5 (LL) 09/23/2017    PO2ART 148 2 (H) 09/23/2017    TYT3PXX 3 7 (L) 09/23/2017    BEART -25 1 09/23/2017    SOURCE Brachial, Right 09/23/2017         Results from last 7 days  Lab Units 09/23/17  0857 09/23/17  0516   SODIUM mmol/L 135* 131*   POTASSIUM mmol/L 3 7 4 5   CHLORIDE mmol/L 105 102   CO2 mmol/L <5* <5*   BUN mg/dL 86* 85*   CREATININE mg/dL 4 38* 4 77*   CALCIUM mg/dL 7 1* 7 3*   TOTAL PROTEIN g/dL  --  7 0   BILIRUBIN TOTAL mg/dL  --  0 30   ALK PHOS U/L  --  289*   ALT U/L  --  22   AST U/L  --  24   GLUCOSE RANDOM mg/dL 202* 349*       Radiology review:     RENAL ULTRASOUND     INDICATION: Acute renal failure    Renal transplant history      COMPARISON: None      TECHNIQUE:   Ultrasound of the retroperitoneum was performed with a curvilinear transducer utilizing volumetric sweeps and still imaging techniques       FINDINGS:     KIDNEYS:  Limited portable ultrasound performed in the ICU  Imaging limited by rapid respiratory rate and labored breathing      No images provided of the native kidneys      Images of a transplant kidney show no evidence for hydronephrosis  The transplant measures 10 5 x 5 3 cm  There is mild relative increased parenchymal cortical echogenicity and prominence of the renal pyramids which is nonspecific but suggests   underlying inflammation      Doppler evaluation shows the main renal artery and renal vein to be patent  No definitive findings to suggest renal artery stenosis  Resistive indices obtained within the transplant itself are variable, in part related to respiratory motion artifact,   the resistive indices range primarily within normal limits, sampled at 0 68, 0 77 and 0 67  Only one of these was elevated at 0 85      Several images suggest the presence of ascites      No loculated pararenal collections      BLADDER:   No images provided      IMPRESSION:     No hydronephrosis  Renal artery and vein patent  Limited as above  See above for further details  Probable ascites          Workstation performed: [id number]      Imaging     US transplant kidney with doppler (Order #[id number]) on 9/23/2017 - Imaging Information             Counseling / Coordination of Care  Total additional floor / unit time spent today was 45 min of critical care time and 35 minutes of counseling and coordination of care  Greater than 50% of total time was spent with the patient and / or family counseling and / or coordination of care  A description of the counseling / coordination of care: assessment as a potential transfer, stood by ultrasound tech during renal US, one RI was 0 85 however, accuracy is suspect due to the patient in edelmira Kussmaul respirations  Discussions with Dr Rose Marie Gilmore, DO

## 2017-09-23 NOTE — DISCHARGE SUMMARY
Discharge Summary - [facility] Internal Medicine    Patient Information: Racheal Henry 51 y o  male MRN: [id number]  Unit/Bed#:  Encounter: [id number]    Discharging Physician / Practitioner: Pippa Coon MD  PCP: Carli Sanchez DO  Admission Date: 9/23/2017  Discharge Date: 09/23/17    Reason for Admission: DKA    Discharge Diagnoses:     Principal Problem:    DKA (diabetic ketoacidoses)  Active Problems:    Foot pain    MIKE (acute kidney injury)    Chronic kidney disease, stage IV (severe)    Chronic osteomyelitis of tibia    Immunosuppression    Elevated troponin    Diarrhea    Elevated INR    Non-ST elevation myocardial infarction (NSTEMI), type 2  Resolved Problems:    Cellulitis    Pain from implanted hardware      Consultations During Hospital Stay:  · Nephrology    Procedures Performed:     · None    Significant Findings / Test Results:     · Multiple abnormal labs    Incidental Findings:   · N/A     Test Results Pending at Discharge (will require follow up):   · Multiple cultures     Outpatient Tests Requested:  · N/A    Complications:  None    Hospital Course:     Racheal Henry is a 51 y o  male patient who originally presented to the hospital on 9/23/2017 due to respiratory distress  He was found to be in severe DKA requiring bicarb drip, with an acute kidney injury and suspicion for an acute osteomyelitis as well as acute c  Diff colitis vs  Other infectious process  The patient was pan-cultured and treated with broad spectrum antibiotics  He was placed on aggressive IV hydration, given bicarbonate IV and monitored in CCU with hourly glucose checks as well as BMP every 2 hours  The patient was also found to have NSTEMI type 2 due to DKA as his Troponins were trended  The patient was found to have an elevated INR of 3 8 at admission, this was deemed related to his intra-abdominal infectious process  Due to this, medical DVT prophylaxis was not initiated    Since the patient is s/p renal transplant and is immunosuppressed on multiple immunosuppressive meditations it was deemed to patient's benefit and best outcome to transfer him to a multi-disciplinary facility where a transplant specialist and an intensive care specialist are available  As the patient received his transplant care at [address] [place] [address], he requested that he is transferred there for further management  Condition at Discharge: fair     Discharge Day Visit / Exam:     Subjective:  Patient reports generalized weakness and malaise  It is difficult for him to provide history due to his distress  His significant other is at bedside and assisting with providing history  Apparently, the patient has been having diarrhea for the past 36 hours  Prior to that he states he had   Vitals: Blood Pressure: 111/64 (09/23/17 1207)  Pulse: 75 (09/23/17 1207)  Temperature: (!) 97 3 °F (36 3 °C) (09/23/17 1207)  Temp Source: Temporal (09/23/17 1207)  Respirations: (!) 23 (09/23/17 1207)  Height: 5' 4" (162 6 cm) (09/23/17 0450)  Weight - Scale: 48 5 kg (106 lb 14 8 oz) (09/23/17 0517)  SpO2: 99 % (09/23/17 1207)  Exam:     Physical Exam   Constitutional: He is oriented to person, place, and time  He appears lethargic  He appears cachectic  He appears ill  HENT:   Head: Normocephalic  Mouth/Throat: Oropharynx is clear and moist    Neck: Neck supple  Cardiovascular: Normal rate and regular rhythm  Exam reveals no gallop and no friction rub  No murmur heard  Pulmonary/Chest: Tachypnea noted  No respiratory distress  He has no wheezes  He has no rales  Abdominal: Soft  There is no tenderness  There is no rebound  Musculoskeletal: He exhibits no edema  Right ankle erythema and tenderness   Neurological: He is oriented to person, place, and time  He appears lethargic  Skin: Skin is warm and dry         Discussion with Family: Significant other    Discharge instructions/Information to patient and family:   See after visit summary for information provided to patient and family  Provisions for Follow-Up Care:  See after visit summary for information related to follow-up care and any pertinent home health orders  Disposition:     Transfer to [address] [place] [address]  [address]  to [facility]:   · Not Applicable to this Patient - Not Applicable to this Patient    Planned Readmission: Unknown     Discharge Statement:  I spent 45 minutes on care and discharge of this patient the patient  This time was spent on the day of discharge  I had direct contact with the patient on the day of discharge  Greater than 50% of the total time was spent examining patient, answering all patient questions, arranging and discussing plan of care with patient as well as directly providing post-discharge instructions  Additional time then spent on discharge activities  Discharge Medications:  See after visit summary for reconciled discharge medications provided to patient and family        ** Please Note: This note has been constructed using a voice recognition system **

## 2017-09-23 NOTE — PROGRESS NOTES
Dr Alonso Early notified of recent lab draw results  Also aware of insulin drip change from 2 units/hr to 1 unit/hr  Also made aware that patient dnv yet and did a bladder scan, will straight cath  Will also call Dr Chikis Davey regarding ivf with sodium bicarb

## 2017-09-23 NOTE — PROGRESS NOTES
Received patient from ER via stretcher  Insulin drip started prior to patient arrival @ 4 units/hr, cefepime and Magnesium 2G also initiated in ER  Patient placed on monitor, assessment completed, vs taken, dr made aware patient on unit

## 2017-09-23 NOTE — ED PROCEDURE NOTE
PROCEDURE  ECG 12 Lead Documentation  Date/Time: 9/23/2017 4:52 AM  Performed by: Claudia Bajwa  Authorized by: Claudia Bajwa     Indications / Diagnosis:  Sob  ECG reviewed by me, the ED Provider: yes    Patient location:  ED  Previous ECG:     Previous ECG:  Compared to current    Comparison ECG info:  New prolongation of QTc, new T-wave flattening diffusely  Interpretation:     Interpretation: abnormal    Rate:     ECG rate:  82    ECG rate assessment: normal    Rhythm:     Rhythm: sinus rhythm    Ectopy:     Ectopy: none    QRS:     QRS axis:  Normal  Conduction:     Conduction: normal    ST segments:     ST segments:  Normal  T waves:     T waves: flattening    Other findings:     Other findings: prolonged qTc interval    Comments:      QTc 507 ms

## 2017-09-23 NOTE — PROGRESS NOTES
Dr Theadore Kocher called for update on patient consult  Will see patient on Monday  Aware that MRI can not be done on RT Ankle until Monday r/t patient being on insulin drip

## 2017-09-23 NOTE — PROGRESS NOTES
Dr Oliverio Corral notified of recent lab results  ABG drawn  Will increase current NS bolus to wide open to finish and then start on D5NS @ 250 per Dr Oliverio Corral

## 2017-09-24 ENCOUNTER — APPOINTMENT (INPATIENT)
Dept: RADIOLOGY | Facility: HOSPITAL | Age: 47
DRG: 637 | End: 2017-09-24
Payer: COMMERCIAL

## 2017-09-24 PROBLEM — R93.89 ABNORMAL X-RAY: Status: ACTIVE | Noted: 2017-09-24

## 2017-09-24 PROBLEM — R33.9 URINARY RETENTION: Chronic | Status: ACTIVE | Noted: 2017-09-24

## 2017-09-24 LAB
ACANTHOCYTES BLD QL SMEAR: PRESENT
ALBUMIN SERPL BCP-MCNC: 2.8 G/DL (ref 3.5–5)
ALP SERPL-CCNC: 235 U/L (ref 46–116)
ALT SERPL W P-5'-P-CCNC: 21 U/L (ref 12–78)
ANION GAP SERPL CALCULATED.3IONS-SCNC: 20 MMOL/L (ref 4–13)
ANION GAP SERPL CALCULATED.3IONS-SCNC: 20 MMOL/L (ref 4–13)
ANION GAP SERPL CALCULATED.3IONS-SCNC: 21 MMOL/L (ref 4–13)
ANION GAP SERPL CALCULATED.3IONS-SCNC: 22 MMOL/L (ref 4–13)
ANION GAP SERPL CALCULATED.3IONS-SCNC: 23 MMOL/L (ref 4–13)
ANISOCYTOSIS BLD QL SMEAR: PRESENT
APTT PPP: 84 SECONDS (ref 23–35)
ARTERIAL PATENCY WRIST A: YES
AST SERPL W P-5'-P-CCNC: 26 U/L (ref 5–45)
BASE EXCESS BLDA CALC-SCNC: -22.2 MMOL/L
BASOPHILS # BLD MANUAL: 0 THOUSAND/UL (ref 0–0.1)
BASOPHILS NFR MAR MANUAL: 0 % (ref 0–1)
BILIRUB DIRECT SERPL-MCNC: 0.07 MG/DL (ref 0–0.2)
BILIRUB SERPL-MCNC: 0.2 MG/DL (ref 0.2–1)
BUN SERPL-MCNC: 75 MG/DL (ref 5–25)
BUN SERPL-MCNC: 77 MG/DL (ref 5–25)
BUN SERPL-MCNC: 78 MG/DL (ref 5–25)
BUN SERPL-MCNC: 79 MG/DL (ref 5–25)
BUN SERPL-MCNC: 80 MG/DL (ref 5–25)
BUN SERPL-MCNC: 80 MG/DL (ref 5–25)
BURR CELLS BLD QL SMEAR: PRESENT
CALCIUM SERPL-MCNC: 6.7 MG/DL (ref 8.3–10.1)
CALCIUM SERPL-MCNC: 6.8 MG/DL (ref 8.3–10.1)
CALCIUM SERPL-MCNC: 6.8 MG/DL (ref 8.3–10.1)
CALCIUM SERPL-MCNC: 6.9 MG/DL (ref 8.3–10.1)
CALCIUM SERPL-MCNC: 6.9 MG/DL (ref 8.3–10.1)
CALCIUM SERPL-MCNC: 7 MG/DL (ref 8.3–10.1)
CALCIUM SERPL-MCNC: 7 MG/DL (ref 8.3–10.1)
CALCIUM SERPL-MCNC: 7.1 MG/DL (ref 8.3–10.1)
CALCIUM SERPL-MCNC: 7.2 MG/DL (ref 8.3–10.1)
CALCIUM SERPL-MCNC: 7.2 MG/DL (ref 8.3–10.1)
CALCIUM SERPL-MCNC: 7.3 MG/DL (ref 8.3–10.1)
CHLORIDE SERPL-SCNC: 110 MMOL/L (ref 100–108)
CHLORIDE SERPL-SCNC: 111 MMOL/L (ref 100–108)
CHLORIDE SERPL-SCNC: 112 MMOL/L (ref 100–108)
CHLORIDE SERPL-SCNC: 113 MMOL/L (ref 100–108)
CHOLEST SERPL-MCNC: 61 MG/DL (ref 50–200)
CO2 SERPL-SCNC: 5 MMOL/L (ref 21–32)
CO2 SERPL-SCNC: 5 MMOL/L (ref 21–32)
CO2 SERPL-SCNC: 6 MMOL/L (ref 21–32)
CO2 SERPL-SCNC: 7 MMOL/L (ref 21–32)
CO2 SERPL-SCNC: 7 MMOL/L (ref 21–32)
CO2 SERPL-SCNC: <5 MMOL/L (ref 21–32)
CREAT SERPL-MCNC: 3.86 MG/DL (ref 0.6–1.3)
CREAT SERPL-MCNC: 3.89 MG/DL (ref 0.6–1.3)
CREAT SERPL-MCNC: 3.89 MG/DL (ref 0.6–1.3)
CREAT SERPL-MCNC: 3.92 MG/DL (ref 0.6–1.3)
CREAT SERPL-MCNC: 3.93 MG/DL (ref 0.6–1.3)
CREAT SERPL-MCNC: 3.95 MG/DL (ref 0.6–1.3)
CREAT SERPL-MCNC: 3.95 MG/DL (ref 0.6–1.3)
CREAT SERPL-MCNC: 3.96 MG/DL (ref 0.6–1.3)
CREAT SERPL-MCNC: 3.97 MG/DL (ref 0.6–1.3)
CREAT SERPL-MCNC: 3.98 MG/DL (ref 0.6–1.3)
CREAT SERPL-MCNC: 3.98 MG/DL (ref 0.6–1.3)
CREAT UR-MCNC: 148 MG/DL
DEPRECATED D DIMER PPP: 1135 NG/ML (FEU) (ref 0–424)
EOSINOPHIL # BLD MANUAL: 0 THOUSAND/UL (ref 0–0.4)
EOSINOPHIL NFR BLD MANUAL: 0 % (ref 0–6)
ERYTHROCYTE [DISTWIDTH] IN BLOOD BY AUTOMATED COUNT: 17.5 % (ref 11.6–15.1)
EST. AVERAGE GLUCOSE BLD GHB EST-MCNC: 111 MG/DL
FIBRINOGEN PPP-MCNC: 407 MG/DL (ref 227–495)
GFR SERPL CREATININE-BSD FRML MDRD: 17 ML/MIN/1.73SQ M
GLUCOSE SERPL-MCNC: 107 MG/DL (ref 65–140)
GLUCOSE SERPL-MCNC: 124 MG/DL (ref 65–140)
GLUCOSE SERPL-MCNC: 128 MG/DL (ref 65–140)
GLUCOSE SERPL-MCNC: 130 MG/DL (ref 65–140)
GLUCOSE SERPL-MCNC: 137 MG/DL (ref 65–140)
GLUCOSE SERPL-MCNC: 139 MG/DL (ref 65–140)
GLUCOSE SERPL-MCNC: 140 MG/DL (ref 65–140)
GLUCOSE SERPL-MCNC: 145 MG/DL (ref 65–140)
GLUCOSE SERPL-MCNC: 148 MG/DL (ref 65–140)
GLUCOSE SERPL-MCNC: 149 MG/DL (ref 65–140)
GLUCOSE SERPL-MCNC: 152 MG/DL (ref 65–140)
GLUCOSE SERPL-MCNC: 172 MG/DL (ref 65–140)
GLUCOSE SERPL-MCNC: 179 MG/DL (ref 65–140)
GLUCOSE SERPL-MCNC: 182 MG/DL (ref 65–140)
GLUCOSE SERPL-MCNC: 193 MG/DL (ref 65–140)
GLUCOSE SERPL-MCNC: 218 MG/DL (ref 65–140)
GLUCOSE SERPL-MCNC: 222 MG/DL (ref 65–140)
GLUCOSE SERPL-MCNC: 240 MG/DL (ref 65–140)
GLUCOSE SERPL-MCNC: 242 MG/DL (ref 65–140)
GLUCOSE SERPL-MCNC: 250 MG/DL (ref 65–140)
GLUCOSE SERPL-MCNC: 262 MG/DL (ref 65–140)
GLUCOSE SERPL-MCNC: 273 MG/DL (ref 65–140)
GLUCOSE SERPL-MCNC: 286 MG/DL (ref 65–140)
GLUCOSE SERPL-MCNC: 321 MG/DL (ref 65–140)
GLUCOSE SERPL-MCNC: 321 MG/DL (ref 65–140)
GLUCOSE SERPL-MCNC: 336 MG/DL (ref 65–140)
GLUCOSE SERPL-MCNC: 345 MG/DL (ref 65–140)
GLUCOSE SERPL-MCNC: 364 MG/DL (ref 65–140)
GLUCOSE SERPL-MCNC: 390 MG/DL (ref 65–140)
GLUCOSE SERPL-MCNC: 395 MG/DL (ref 65–140)
GLUCOSE SERPL-MCNC: 397 MG/DL (ref 65–140)
GLUCOSE SERPL-MCNC: 398 MG/DL (ref 65–140)
GLUCOSE SERPL-MCNC: 408 MG/DL (ref 65–140)
GLUCOSE SERPL-MCNC: 433 MG/DL (ref 65–140)
GLUCOSE SERPL-MCNC: 73 MG/DL (ref 65–140)
HBA1C MFR BLD: 5.5 % (ref 4.2–6.3)
HCO3 BLDA-SCNC: 5.6 MMOL/L (ref 22–28)
HCT VFR BLD AUTO: 25.7 % (ref 36.5–49.3)
HDLC SERPL-MCNC: 20 MG/DL (ref 40–60)
HGB BLD-MCNC: 8.3 G/DL (ref 12–17)
INR PPP: 5.6 (ref 0.86–1.16)
LDH SERPL-CCNC: 174 U/L (ref 81–234)
LDLC SERPL CALC-MCNC: 25 MG/DL (ref 0–100)
LYMPHOCYTES # BLD AUTO: 0.06 THOUSAND/UL (ref 0.6–4.47)
LYMPHOCYTES # BLD AUTO: 1 % (ref 14–44)
MAGNESIUM SERPL-MCNC: 1.2 MG/DL (ref 1.6–2.6)
MCH RBC QN AUTO: 26 PG (ref 26.8–34.3)
MCHC RBC AUTO-ENTMCNC: 32.3 G/DL (ref 31.4–37.4)
MCV RBC AUTO: 81 FL (ref 82–98)
MONOCYTES # BLD AUTO: 0.12 THOUSAND/UL (ref 0–1.22)
MONOCYTES NFR BLD: 2 % (ref 4–12)
NEUTROPHILS # BLD MANUAL: 5.94 THOUSAND/UL (ref 1.85–7.62)
NEUTS BAND NFR BLD MANUAL: 3 % (ref 0–8)
NEUTS SEG NFR BLD AUTO: 94 % (ref 43–75)
NON VENT ROOM AIR: 21 %
O2 CT BLDA-SCNC: 12 ML/DL (ref 16–23)
OXYHGB MFR BLDA: 93.4 % (ref 94–97)
PCO2 BLDA: 18.5 MM HG (ref 36–44)
PH BLDA: 7.1 [PH] (ref 7.35–7.45)
PHOSPHATE SERPL-MCNC: 6 MG/DL (ref 2.7–4.5)
PLATELET # BLD AUTO: 389 THOUSANDS/UL (ref 149–390)
PLATELET BLD QL SMEAR: ADEQUATE
PMV BLD AUTO: 9.8 FL (ref 8.9–12.7)
PO2 BLDA: 84.9 MM HG (ref 75–129)
POIKILOCYTOSIS BLD QL SMEAR: PRESENT
POTASSIUM SERPL-SCNC: 3.3 MMOL/L (ref 3.5–5.3)
POTASSIUM SERPL-SCNC: 3.4 MMOL/L (ref 3.5–5.3)
POTASSIUM SERPL-SCNC: 3.5 MMOL/L (ref 3.5–5.3)
POTASSIUM SERPL-SCNC: 4 MMOL/L (ref 3.5–5.3)
POTASSIUM SERPL-SCNC: 4.1 MMOL/L (ref 3.5–5.3)
POTASSIUM SERPL-SCNC: 4.2 MMOL/L (ref 3.5–5.3)
POTASSIUM SERPL-SCNC: 4.2 MMOL/L (ref 3.5–5.3)
POTASSIUM SERPL-SCNC: 4.3 MMOL/L (ref 3.5–5.3)
POTASSIUM SERPL-SCNC: 4.4 MMOL/L (ref 3.5–5.3)
POTASSIUM SERPL-SCNC: 4.4 MMOL/L (ref 3.5–5.3)
POTASSIUM SERPL-SCNC: 4.5 MMOL/L (ref 3.5–5.3)
PROT SERPL-MCNC: 6.1 G/DL (ref 6.4–8.2)
PROTHROMBIN TIME: 51.4 SECONDS (ref 12.1–14.4)
RBC # BLD AUTO: 3.19 MILLION/UL (ref 3.88–5.62)
SODIUM 24H UR-SCNC: 28 MOL/L
SODIUM SERPL-SCNC: 137 MMOL/L (ref 136–145)
SODIUM SERPL-SCNC: 138 MMOL/L (ref 136–145)
SODIUM SERPL-SCNC: 139 MMOL/L (ref 136–145)
SODIUM SERPL-SCNC: 140 MMOL/L (ref 136–145)
SODIUM SERPL-SCNC: 140 MMOL/L (ref 136–145)
SPECIMEN SOURCE: ABNORMAL
TOTAL CELLS COUNTED SPEC: 100
TRIGL SERPL-MCNC: 80 MG/DL
TROPONIN I SERPL-MCNC: 0.46 NG/ML
TROPONIN I SERPL-MCNC: 0.48 NG/ML
WBC # BLD AUTO: 6.12 THOUSAND/UL (ref 4.31–10.16)

## 2017-09-24 PROCEDURE — 85007 BL SMEAR W/DIFF WBC COUNT: CPT | Performed by: FAMILY MEDICINE

## 2017-09-24 PROCEDURE — 85730 THROMBOPLASTIN TIME PARTIAL: CPT | Performed by: FAMILY MEDICINE

## 2017-09-24 PROCEDURE — 84484 ASSAY OF TROPONIN QUANT: CPT | Performed by: FAMILY MEDICINE

## 2017-09-24 PROCEDURE — 80048 BASIC METABOLIC PNL TOTAL CA: CPT | Performed by: FAMILY MEDICINE

## 2017-09-24 PROCEDURE — 82948 REAGENT STRIP/BLOOD GLUCOSE: CPT

## 2017-09-24 PROCEDURE — 83010 ASSAY OF HAPTOGLOBIN QUANT: CPT | Performed by: FAMILY MEDICINE

## 2017-09-24 PROCEDURE — 94760 N-INVAS EAR/PLS OXIMETRY 1: CPT

## 2017-09-24 PROCEDURE — 94640 AIRWAY INHALATION TREATMENT: CPT

## 2017-09-24 PROCEDURE — 82805 BLOOD GASES W/O2 SATURATION: CPT | Performed by: FAMILY MEDICINE

## 2017-09-24 PROCEDURE — 85610 PROTHROMBIN TIME: CPT | Performed by: FAMILY MEDICINE

## 2017-09-24 PROCEDURE — 85384 FIBRINOGEN ACTIVITY: CPT | Performed by: FAMILY MEDICINE

## 2017-09-24 PROCEDURE — 83615 LACTATE (LD) (LDH) ENZYME: CPT | Performed by: FAMILY MEDICINE

## 2017-09-24 PROCEDURE — 94664 DEMO&/EVAL PT USE INHALER: CPT

## 2017-09-24 PROCEDURE — 36600 WITHDRAWAL OF ARTERIAL BLOOD: CPT

## 2017-09-24 PROCEDURE — 80061 LIPID PANEL: CPT | Performed by: FAMILY MEDICINE

## 2017-09-24 PROCEDURE — 71010 HB CHEST X-RAY 1 VIEW FRONTAL (PORTABLE): CPT

## 2017-09-24 PROCEDURE — 83735 ASSAY OF MAGNESIUM: CPT | Performed by: FAMILY MEDICINE

## 2017-09-24 PROCEDURE — 84100 ASSAY OF PHOSPHORUS: CPT | Performed by: FAMILY MEDICINE

## 2017-09-24 PROCEDURE — 85379 FIBRIN DEGRADATION QUANT: CPT | Performed by: FAMILY MEDICINE

## 2017-09-24 PROCEDURE — 80076 HEPATIC FUNCTION PANEL: CPT | Performed by: FAMILY MEDICINE

## 2017-09-24 PROCEDURE — 83036 HEMOGLOBIN GLYCOSYLATED A1C: CPT | Performed by: FAMILY MEDICINE

## 2017-09-24 PROCEDURE — 85027 COMPLETE CBC AUTOMATED: CPT | Performed by: FAMILY MEDICINE

## 2017-09-24 RX ORDER — DEXTROSE MONOHYDRATE 25 G/50ML
50 INJECTION, SOLUTION INTRAVENOUS ONCE
Status: COMPLETED | OUTPATIENT
Start: 2017-09-24 | End: 2017-09-24

## 2017-09-24 RX ORDER — SODIUM CHLORIDE 9 MG/ML
125 INJECTION, SOLUTION INTRAVENOUS CONTINUOUS
Status: DISCONTINUED | OUTPATIENT
Start: 2017-09-24 | End: 2017-09-24

## 2017-09-24 RX ORDER — DEXTROSE MONOHYDRATE 25 G/50ML
25 INJECTION, SOLUTION INTRAVENOUS ONCE
Status: COMPLETED | OUTPATIENT
Start: 2017-09-24 | End: 2017-09-24

## 2017-09-24 RX ORDER — MAGNESIUM SULFATE HEPTAHYDRATE 40 MG/ML
2 INJECTION, SOLUTION INTRAVENOUS
Status: COMPLETED | OUTPATIENT
Start: 2017-09-24 | End: 2017-09-24

## 2017-09-24 RX ORDER — POTASSIUM CHLORIDE 14.9 MG/ML
20 INJECTION INTRAVENOUS
Status: COMPLETED | OUTPATIENT
Start: 2017-09-24 | End: 2017-09-24

## 2017-09-24 RX ORDER — DEXTROSE AND SODIUM CHLORIDE 5; .9 G/100ML; G/100ML
150 INJECTION, SOLUTION INTRAVENOUS CONTINUOUS
Status: DISCONTINUED | OUTPATIENT
Start: 2017-09-24 | End: 2017-09-24

## 2017-09-24 RX ORDER — LEVALBUTEROL 1.25 MG/.5ML
SOLUTION, CONCENTRATE RESPIRATORY (INHALATION)
Status: COMPLETED
Start: 2017-09-24 | End: 2017-09-24

## 2017-09-24 RX ORDER — LEVALBUTEROL 1.25 MG/.5ML
1.25 SOLUTION, CONCENTRATE RESPIRATORY (INHALATION) 3 TIMES DAILY PRN
Status: DISCONTINUED | OUTPATIENT
Start: 2017-09-24 | End: 2017-09-25

## 2017-09-24 RX ORDER — FUROSEMIDE 10 MG/ML
40 INJECTION INTRAMUSCULAR; INTRAVENOUS ONCE
Status: COMPLETED | OUTPATIENT
Start: 2017-09-24 | End: 2017-09-24

## 2017-09-24 RX ORDER — POTASSIUM CHLORIDE 14.9 MG/ML
40 INJECTION INTRAVENOUS ONCE
Status: DISCONTINUED | OUTPATIENT
Start: 2017-09-24 | End: 2017-09-24 | Stop reason: SDUPTHER

## 2017-09-24 RX ORDER — DEXTROSE MONOHYDRATE 25 G/50ML
INJECTION, SOLUTION INTRAVENOUS
Status: COMPLETED
Start: 2017-09-24 | End: 2017-09-24

## 2017-09-24 RX ORDER — MAGNESIUM SULFATE HEPTAHYDRATE 40 MG/ML
4 INJECTION, SOLUTION INTRAVENOUS ONCE
Status: DISCONTINUED | OUTPATIENT
Start: 2017-09-24 | End: 2017-09-24 | Stop reason: ALTCHOICE

## 2017-09-24 RX ORDER — PHYTONADIONE 10 MG/ML
10 INJECTION, EMULSION INTRAMUSCULAR; INTRAVENOUS; SUBCUTANEOUS ONCE
Status: COMPLETED | OUTPATIENT
Start: 2017-09-24 | End: 2017-09-24

## 2017-09-24 RX ORDER — DEXTROSE AND SODIUM CHLORIDE 5; .45 G/100ML; G/100ML
150 INJECTION, SOLUTION INTRAVENOUS CONTINUOUS
Status: DISCONTINUED | OUTPATIENT
Start: 2017-09-24 | End: 2017-09-25

## 2017-09-24 RX ORDER — DEXTROSE AND SODIUM CHLORIDE 5; .9 G/100ML; G/100ML
125 INJECTION, SOLUTION INTRAVENOUS CONTINUOUS
Status: DISCONTINUED | OUTPATIENT
Start: 2017-09-24 | End: 2017-09-24

## 2017-09-24 RX ADMIN — MAGNESIUM SULFATE HEPTAHYDRATE 2 G: 40 INJECTION, SOLUTION INTRAVENOUS at 13:23

## 2017-09-24 RX ADMIN — FUROSEMIDE 40 MG: 10 INJECTION, SOLUTION INTRAMUSCULAR; INTRAVENOUS at 09:26

## 2017-09-24 RX ADMIN — IPRATROPIUM BROMIDE 0.5 MG: 0.5 SOLUTION RESPIRATORY (INHALATION) at 17:02

## 2017-09-24 RX ADMIN — DEXTROSE AND SODIUM CHLORIDE 250 ML/HR: 5; .9 INJECTION, SOLUTION INTRAVENOUS at 04:52

## 2017-09-24 RX ADMIN — LEVALBUTEROL HYDROCHLORIDE 1.25 MG: 1.25 SOLUTION, CONCENTRATE RESPIRATORY (INHALATION) at 11:11

## 2017-09-24 RX ADMIN — PHYTONADIONE 10 MG: 10 INJECTION, EMULSION INTRAMUSCULAR; INTRAVENOUS; SUBCUTANEOUS at 10:04

## 2017-09-24 RX ADMIN — DEXTROSE AND SODIUM CHLORIDE 150 ML/HR: 5; .45 INJECTION, SOLUTION INTRAVENOUS at 10:10

## 2017-09-24 RX ADMIN — TACROLIMUS 0.5 MG: 0.5 CAPSULE ORAL at 17:34

## 2017-09-24 RX ADMIN — MAGNESIUM SULFATE HEPTAHYDRATE 2 G: 40 INJECTION, SOLUTION INTRAVENOUS at 10:12

## 2017-09-24 RX ADMIN — ATENOLOL 25 MG: 25 TABLET ORAL at 09:25

## 2017-09-24 RX ADMIN — DEXTROSE MONOHYDRATE 25 ML: 500 INJECTION PARENTERAL at 17:33

## 2017-09-24 RX ADMIN — LEVALBUTEROL HYDROCHLORIDE 1.25 MG: 1.25 SOLUTION, CONCENTRATE RESPIRATORY (INHALATION) at 05:18

## 2017-09-24 RX ADMIN — POTASSIUM CHLORIDE 20 MEQ: 200 INJECTION, SOLUTION INTRAVENOUS at 18:15

## 2017-09-24 RX ADMIN — POTASSIUM CHLORIDE 20 MEQ: 200 INJECTION, SOLUTION INTRAVENOUS at 15:49

## 2017-09-24 RX ADMIN — METRONIDAZOLE 500 MG: 500 INJECTION, SOLUTION INTRAVENOUS at 01:10

## 2017-09-24 RX ADMIN — LEVALBUTEROL HYDROCHLORIDE 1.25 MG: 1.25 SOLUTION, CONCENTRATE RESPIRATORY (INHALATION) at 17:02

## 2017-09-24 RX ADMIN — DEXTROSE MONOHYDRATE 50 ML: 500 INJECTION PARENTERAL at 19:37

## 2017-09-24 RX ADMIN — IPRATROPIUM BROMIDE 0.5 MG: 0.5 SOLUTION RESPIRATORY (INHALATION) at 05:18

## 2017-09-24 RX ADMIN — CEFEPIME HYDROCHLORIDE 2000 MG: 2 INJECTION, POWDER, FOR SOLUTION INTRAVENOUS at 11:14

## 2017-09-24 RX ADMIN — INSULIN DETEMIR 10 UNITS: 100 INJECTION, SOLUTION SUBCUTANEOUS at 04:16

## 2017-09-24 RX ADMIN — HYDROCORTISONE SODIUM SUCCINATE 100 MG: 100 INJECTION, POWDER, FOR SOLUTION INTRAMUSCULAR; INTRAVENOUS at 05:33

## 2017-09-24 RX ADMIN — DEXTROSE MONOHYDRATE 25 ML: 25 INJECTION, SOLUTION INTRAVENOUS at 16:45

## 2017-09-24 RX ADMIN — DEXTROSE MONOHYDRATE 25 ML: 25 INJECTION, SOLUTION INTRAVENOUS at 17:29

## 2017-09-24 RX ADMIN — HYDROCORTISONE SODIUM SUCCINATE 100 MG: 100 INJECTION, POWDER, FOR SOLUTION INTRAMUSCULAR; INTRAVENOUS at 13:23

## 2017-09-24 RX ADMIN — DEXTROSE AND SODIUM CHLORIDE 125 ML/HR: 5; .9 INJECTION, SOLUTION INTRAVENOUS at 09:01

## 2017-09-24 RX ADMIN — DEXTROSE AND SODIUM CHLORIDE 150 ML/HR: 5; .45 INJECTION, SOLUTION INTRAVENOUS at 16:42

## 2017-09-24 RX ADMIN — HYDROCORTISONE SODIUM SUCCINATE 100 MG: 100 INJECTION, POWDER, FOR SOLUTION INTRAMUSCULAR; INTRAVENOUS at 22:35

## 2017-09-24 RX ADMIN — SODIUM CHLORIDE 16 UNITS/HR: 9 INJECTION, SOLUTION INTRAVENOUS at 11:50

## 2017-09-24 RX ADMIN — METRONIDAZOLE 500 MG: 500 INJECTION, SOLUTION INTRAVENOUS at 17:34

## 2017-09-24 RX ADMIN — METRONIDAZOLE 500 MG: 500 INJECTION, SOLUTION INTRAVENOUS at 10:27

## 2017-09-24 RX ADMIN — IPRATROPIUM BROMIDE 0.5 MG: 0.5 SOLUTION RESPIRATORY (INHALATION) at 11:11

## 2017-09-24 RX ADMIN — TACROLIMUS 0.5 MG: 0.5 CAPSULE ORAL at 09:26

## 2017-09-24 RX ADMIN — DEXTROSE MONOHYDRATE 50 ML: 25 INJECTION, SOLUTION INTRAVENOUS at 16:46

## 2017-09-24 NOTE — CASE MANAGEMENT
Initial Clinical Review    Admission: Date/Time/Statement: 9/23/17 @ 0644     Orders Placed This Encounter   Procedures    Inpatient Admission (expected length of stay for this patient is greater than two midnights)     Standing Status:   Standing     Number of Occurrences:   1     Order Specific Question:   Admitting Physician     Answer:   Nithya Christie     Order Specific Question:   Level of Care     Answer:   Critical Care [15]     Order Specific Question:   Estimated length of stay     Answer:   More than 2 Midnights     Order Specific Question:   Certification     Answer:   I certify that inpatient services are medically necessary for this patient for a duration of greater than two midnights  See H&P and MD Progress Notes for additional information about the patient's course of treatment  ED: Date/Time/Mode of Arrival:   ED Arrival Information     Expected Arrival Acuity Means of Arrival Escorted By Service Admission Type    - 9/23/2017 04:41 Urgent Wheelchair Friend General Medicine Urgent    Arrival Complaint    Shortness of Breath          Chief Complaint:   Chief Complaint   Patient presents with    Shortness of Breath     Pt c/o feeling SOB and nauseated since thursday - also states BS @ 0405 was 411mg/dL - took 5u Lantus and 10u Humalog at that time       History of Illness: Patient is a pleasant 51year old male known to me from previous admission who presents today with a chief complaint of of nausea / vomiting / diarrhea  The patient is currently being treated as an outpatient for ankle cellulitis with keflex  He also has a 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  He states that the ankle was red / hot earlier in the week and his orthopedist elected to continue antibiotics for an additional 10 days    He had diarrhea earlier in the week that was non bloody and began improving yesterday     ED Vital Signs:   ED Triage Vitals [09/23/17 0450]   Temperature Pulse Respirations Blood Pressure SpO2   (!) 96 1 °F (35 6 °C) 84 (!) 24 (!) 89/50 100 %      Temp Source Heart Rate Source Patient Position - Orthostatic VS BP Location FiO2 (%)   Temporal Monitor Sitting Left arm --      Pain Score       No Pain        Wt Readings from Last 1 Encounters:   09/23/17 48 5 kg (106 lb 14 8 oz)       Vital Signs (abnormal): Blood Pressure: (!) 89/50 (09/23/17 0450)  Pulse: 84 (09/23/17 0450)  Temperature: (!) 96 1 °F (35 6 °C) (09/23/17 0450)  Temp Source: Temporal (09/23/17 0450)  Respirations: (!) 24 (09/23/17 0450)  Height: 5' 4" (162 6 cm) (09/23/17 0450)  Weight - Scale: 48 5 kg (106 lb 14 8 oz) (09/23/17 0517)  SpO2: 100 % (09/23/17 0450)       Abnormal Labs/Diagnostic Test Results:    WBC 5 83 09/23/2017     HGB 9 5 (L) 09/23/2017     HCT 29 8 (L) 09/23/2017     MCV 82 09/23/2017      (H) 09/23/2017            Lab Results   Component Value Date     GLUCOSE 349 (H) 09/23/2017     CALCIUM 7 3 (L) 09/23/2017      (L) 09/23/2017     K 4 5 09/23/2017     CO2 <5 (LL) 09/23/2017      09/23/2017     BUN 85 (H) 09/23/2017     CREATININE 4 77 (H) 09/23/2017         AUBREY TEST  Yes   AUBREY TEST      pH, Arterial  7 028  pH, Arterial     pCO2, Arterial  14 5  pCO2, Arterial     pO2, Arterial  148 2  pO2, Arterial     HCO3, Arterial  3 7  HCO3, Arterial     Base Excess, Arterial  -25 1  Base Excess, Arterial     O2 Content, Arterial  13 5  O2 Content, Arterial     O2 HGB,Arterial  96 9  O2 HGB,Arterial     ABG SOURCE  Brachi    ABG SOURCE     Nasal Cannula           EKG, Pathology, and Other Studies: NSR prolonged QT    Slight          Clarity, UA                     Specific [place], UA                1 025      Specific Gravity, UA     Glucose, UA                Negative      Glucose, UA     Ketones, UA                Negative      Ketones, UA     Blood, UA                Moderate      Blood, UA     Nitrite, UA                Negative      Nitrite, UA     Leukocytes, UA Negative      Leukocytes, UA     pH, UA                5 5      pH, UA     Protein, UA                100 (2+)      Protein, UA     Bilirubin, UA                Negative      Bilirubin, UA     Urobilinogen, UA                0 2      Urobilinogen, UA     RBC, UA                1-2      RBC, UA     WBC, UA                0-1      WBC, UA     Bacteria, UA                Occasi          Bacteria, UA     COARSE GRANULAR CASTS                2-3      COARSE GRANULAR CASTS     MICROBIOLOGY       ED Treatment:   Medication Administration from 09/23/2017 0441 to 09/23/2017 0800       Date/Time Order Dose Route Action Action by Comments     09/23/2017 0522 ondansetron (ZOFRAN) injection 4 mg 4 mg Intravenous Given Leatha Pastor RN      09/23/2017 0523 sodium chloride 0 9 % bolus 1,455 mL 1,455 mL Intravenous [address] Leatha Pastor [facility]      09/23/2017 0750 insulin regular (HumuLIN R,NovoLIN R) 1 Units/mL in sodium chloride 0 9 % 100 mL infusion 0 Units/hr Intravenous Stopped Krystle Cervantes RN      09/23/2017 0556 insulin regular (HumuLIN R,NovoLIN R) 1 Units/mL in sodium chloride 0 9 % 100 mL infusion 4 Units/hr Intravenous [address] Leatha Pastor RN      09/23/2017 0555 insulin regular (HumuLIN R,NovoLIN R) injection 4 Units 4 Units Intravenous Given Leatha Pastor RN      09/23/2017 5321 magnesium sulfate 2 g/50 mL IVPB (premix) 2 g 2 g Intravenous [address] Leatha Pastor RN      09/23/2017 4443 fentanyl citrate (PF) 100 MCG/2ML 25 mcg 25 mcg Intravenous Given Leatha Pastor RN      09/23/2017 0722 cefepime (MAXIPIME) IVPB (premix) 2,000 mg 2,000 mg Intravenous [place] Anahi Stone RN      09/23/2017 0750 insulin regular (HumuLIN R,NovoLIN R) 1 Units/mL in sodium chloride 0 9 % 100 mL infusion 4 Units/hr Intravenous Handoff Krystle Cervantes RN      09/23/2017 0734 sodium chloride 0 9 % bolus 1,000 mL 500 mL Intravenous New Mitch Stone RN Past Medical/Surgical History:    Active Ambulatory Problems     Diagnosis Date Noted    Osteomyelitis 10/26/2016    Foot pain 10/26/2016    Hypertension 10/26/2016    Type 1 diabetes mellitus 10/26/2016    Renal transplant, status post 10/26/2016    Hyperkalemia 10/26/2016    MIKE (acute kidney injury) 10/27/2016    Osteomyelitis 12/07/2016    Poor circulation 12/07/2016    Closed fracture of distal end of right tibia with routine healing 07/03/2017    Elevated platelet count 10/89/7923    Hypertension 08/04/2010    Cellulitis of ankle 09/23/2017     Resolved Ambulatory Problems     Diagnosis Date Noted    Cellulitis 10/26/2016    Sepsis 10/27/2016    Pain from implanted hardware 08/17/2017     Past Medical History:   Diagnosis Date    Diabetes mellitus     Diabetes mellitus type 1     Hypertension     Renal failure     Renal transplant, status post 07/21/2007       Admitting Diagnosis: Shortness of breath [R06 02]  Right foot pain [M79 671]  MIKE (acute kidney injury) [N17 9]    Age/Sex: 51 y o  male    Assessment/Plan:    Assessment:         Patient Active Problem List   Diagnosis    Cellulitis    Osteomyelitis    Foot pain    Hypertension    Type 1 diabetes mellitus    Renal transplant, status post    Hyperkalemia    Sepsis    MIKE (acute kidney injury)    Osteomyelitis    Poor circulation    Closed fracture of distal end of right tibia with routine healing    Elevated platelet count    Pain from implanted hardware    Chronic kidney disease, stage IV (severe)    Chronic osteomyelitis of tibia    Immunosuppression    Hypertension    DKA (diabetic ketoacidoses)    Elevated troponin    Diarrhea    Elevated INR         Diabetic Ketoacidosis   Admit to CCU  Aggressive IVF   Initiate DKA Protocol   Initiate Bicarbonate drip @ 100cc/hr   BMP q4  Recheck ABG      Possible early sepsis 2/2 chronic right lower extremity osteomyelitis   Given patient's immunocompromised status will obtain blood cultures, initiate vanco / cefepime     Acute Renal Failure superimposed on CKD IV  Baseline Renal function is 1 5-1 8  Aggressive IVF   Monitor renal function  Urine sodium and Urine creatinine   Consult Nephrology      HypoMg  Repleted in ED  Recheck with labs      Diarrhea  Again given immunocompromised state will check stool culture and for C  Diff  Initiate Flagyl until C  Diff negative      Elevated Troponin  Trend x 3   2D echocardiogram   Fasting Lipid panel  A1c     History of Renal Transplant   Continue prograf / cellcept / Chronic prednisone      Elevated INR  Patient is not on anticoagulation  Will trend INR and follow LFT      LOS > 2 midnights  Full Code  Heparin for DVT Prophy          Admission Orders:  [email or web address]  NPO  [address] [facility]  D/C [email or web address]    Scheduled Meds:   atenolol 25 mg Oral Daily   cefepime 2,000 mg Intravenous Q24H   hydrocortisone sodium succinate 100 mg Intravenous Q8H [address]   insulin detemir 10 Units Subcutaneous HS   magnesium sulfate 2 g Intravenous Q2H   metroNIDAZOLE 500 mg Intravenous Q8H   phytonadione 10 mg Subcutaneous Once   tacrolimus 0 5 mg Oral BID   vancomycin (VANCOCIN) IVPB in 250 mL (over 90 min) 20 mg/kg Intravenous Q48H     Continuous Infusions:   dextrose 5 % and sodium chloride 0 9 % 125 mL/hr Last Rate: 125 mL/hr (09/24/17 0901)   insulin regular (HumuLIN R,NovoLIN R) infusion 4 Units/hr Last Rate: 4 Units/hr (09/24/17 0840)     PRN Meds: ipratropium X1    levalbuterol [address] [facility] in the [facility] by Jw Gagnon for 2017  Network Utilization Review Department  Phone: [phone]; Fax [phone]  ATTENTION: The Network Utilization Review Department is now centralized for our 7 Facilities  Make a note that we have a new phone and fax numbers for our Department   Please call with any questions or concerns to [phone] and carefully follow the prompts so that you are directed to the right person  All voicemails are confidential  Fax any determinations, approvals, denials, and requests for initial or continue stay review clinical to [phone]  Due to HIGH CALL volume, it would be easier if you could please send faxed requests to expedite your requests and in part, help us provide discharge notifications faster

## 2017-09-24 NOTE — PLAN OF CARE
Problem: Potential for Falls  Goal: Patient will remain free of falls  INTERVENTIONS:  - Assess patient frequently for physical needs  -  Identify cognitive and physical deficits and behaviors that affect risk of falls  -  [place] fall precautions as indicated by assessment   - Educate patient/family on patient safety including physical limitations  - Instruct patient to call for assistance with activity based on assessment  - Modify environment to reduce risk of injury  - Consider OT/PT consult to assist with strengthening/mobility   Outcome: Progressing      Problem: Nutrition/Hydration-ADULT  Goal: Nutrient/Hydration intake appropriate for improving, restoring or maintaining nutritional needs  Monitor and assess patient's nutrition/hydration status for malnutrition (ex- brittle hair, bruises, dry skin, pale skin and conjunctiva, muscle wasting, smooth red tongue, and disorientation)  Collaborate with interdisciplinary team and initiate plan and interventions as ordered  Monitor patient's weight and dietary intake as ordered or per policy  Utilize nutrition screening tool and intervene per policy  Determine patient's food preferences and provide high-protein, high-caloric foods as appropriate       INTERVENTIONS:  - Monitor oral intake, urinary output, labs, and treatment plans  - Assess nutrition and hydration status and recommend course of action  - Evaluate amount of meals eaten  - Assist patient with eating if necessary   - Allow adequate time for meals  - Recommend/ encourage appropriate diets, oral nutritional supplements, and vitamin/mineral supplements  - Order, calculate, and assess calorie counts as needed  - Recommend, monitor, and adjust tube feedings and TPN/PPN based on assessed needs  - Assess need for intravenous fluids  - Provide specific nutrition/hydration education as appropriate  - Include patient/family/caregiver in decisions related to nutrition   Outcome: Not Progressing      Problem: PAIN - ADULT  Goal: Verbalizes/displays adequate comfort level or baseline comfort level  Interventions:  - Encourage patient to monitor pain and request assistance  - Assess pain using appropriate pain scale  - Administer analgesics based on type and severity of pain and evaluate response  - Implement non-pharmacological measures as appropriate and evaluate response  - Consider cultural and social influences on pain and pain management  - Notify physician/advanced practitioner if interventions unsuccessful or patient reports new pain   Outcome: Progressing      Problem: INFECTION - ADULT  Goal: Absence or prevention of progression during hospitalization  INTERVENTIONS:  - Assess and monitor for signs and symptoms of infection  - Monitor lab/diagnostic results  - Monitor all insertion sites, i e  indwelling lines, tubes, and drains  - Monitor endotracheal (as able) and nasal secretions for changes in amount and color  - [place] appropriate cooling/warming therapies per order  - Administer medications as ordered  - Instruct and encourage patient and family to use good hand hygiene technique  - Identify and instruct in appropriate isolation precautions for identified infection/condition   Outcome: Progressing      Problem: SAFETY ADULT  Goal: Maintain or return to baseline ADL function  INTERVENTIONS:  -  Assess patient's ability to carry out ADLs; assess patient's baseline for ADL function and identify physical deficits which impact ability to perform ADLs (bathing, care of mouth/teeth, toileting, grooming, dressing, etc )  - Assess/evaluate cause of self-care deficits   - Assess range of motion  - Assess patient's mobility; develop plan if impaired  - Assess patient's need for assistive devices and provide as appropriate  - Encourage maximum independence but intervene and supervise when necessary  ¯ Involve family in performance of ADLs  ¯ Assess for home care needs following discharge   ¯ Request OT consult to assist with ADL evaluation and planning for discharge  ¯ Provide patient education as appropriate   Outcome: Progressing    Goal: Maintain or return mobility status to optimal level  INTERVENTIONS:  - Assess patient's baseline mobility status (ambulation, transfers, stairs, etc )    - Identify cognitive and physical deficits and behaviors that affect mobility  - Identify mobility aids required to assist with transfers and/or ambulation (gait belt, sit-to-stand, lift, walker, cane, etc )  - [place] fall precautions as indicated by assessment  - Record patient progress and toleration of activity level on Mobility SBAR; progress patient to next Phase/Stage  - Instruct patient to call for assistance with activity based on assessment  - Request Rehabilitation consult to assist with strengthening/weightbearing, etc    Outcome: Progressing      Problem: DISCHARGE PLANNING  Goal: Discharge to home or other facility with appropriate resources  INTERVENTIONS:  - Identify barriers to discharge w/patient and caregiver  - Arrange for needed discharge resources and transportation as appropriate  - Identify discharge learning needs (meds, wound care, etc )  - Arrange for interpretive services to assist at discharge as needed  - Refer to Case Management Department for coordinating discharge planning if the patient needs post-hospital services based on physician/advanced practitioner order or complex needs related to functional status, cognitive ability, or social support system   Outcome: Progressing      Problem: Knowledge Deficit  Goal: Patient/family/caregiver demonstrates understanding of disease process, treatment plan, medications, and discharge instructions  Complete learning assessment and assess knowledge base    Interventions:  - Provide teaching at level of understanding  - Provide teaching via preferred learning methods   Outcome: Progressing      Problem: NEUROSENSORY - ADULT  Goal: Achieves stable or improved neurological status  INTERVENTIONS  - Monitor and report changes in neurological status  - Initiate measures to prevent increased intracranial pressure  - Maintain blood pressure and fluid volume within ordered parameters to optimize cerebral perfusion  - Monitor temperature, glucose, and sodium or any other associated labs  Initiate appropriate interventions as ordered  - Monitor for seizure activity   - Administer anti-seizure medications as ordered   Outcome: Progressing      Problem: CARDIOVASCULAR - ADULT  Goal: Maintains optimal cardiac output and hemodynamic stability  INTERVENTIONS:  - Monitor I/O, vital signs and rhythm  - Monitor for S/S and trends of decreased cardiac output i e  bleeding, hypotension  - Administer and titrate ordered vasoactive medications to optimize hemodynamic stability  - Assess quality of pulses, skin color and temperature  - Assess for signs of decreased coronary artery perfusion - ex   Angina  - Instruct patient to report change in severity of symptoms   Outcome: Progressing      Problem: RESPIRATORY - ADULT  Goal: Achieves optimal ventilation and oxygenation  INTERVENTIONS:  - Assess for changes in respiratory status  - Assess for changes in mentation and behavior  - Position to facilitate oxygenation and minimize respiratory effort  - Oxygen administration by appropriate delivery method based on oxygen saturation (per order) or ABGs  - Initiate smoking cessation education as indicated  - Encourage broncho-pulmonary hygiene including cough, deep breathe, Incentive Spirometry  - Assess the need for suctioning and aspirate as needed  - Assess and instruct to report SOB or any respiratory difficulty  - Respiratory Therapy support as indicated   Outcome: Not Progressing      Problem: GASTROINTESTINAL - ADULT  Goal: Minimal or absence of nausea and/or vomiting  INTERVENTIONS:  - Administer IV fluids as ordered to ensure adequate hydration  - Maintain NPO status until nausea and vomiting are resolved  - Nasogastric tube as ordered  - Administer ordered antiemetic medications as needed  - Provide nonpharmacologic comfort measures as appropriate  - Advance diet as tolerated, if ordered  - Nutrition services referral to assist patient with adequate nutrition and appropriate food choices   Outcome: Progressing    Goal: Maintains or returns to baseline bowel function  INTERVENTIONS:  - Assess bowel function  - Encourage oral fluids to ensure adequate hydration  - Administer IV fluids as ordered to ensure adequate hydration  - Administer ordered medications as needed  - Encourage mobilization and activity  - Nutrition services referral to assist patient with appropriate food choices   Outcome: Not Progressing    Goal: Maintains adequate nutritional intake  INTERVENTIONS:  - Monitor percentage of each meal consumed  - Identify factors contributing to decreased intake, treat as appropriate  - Assist with meals as needed  - Monitor I&O, WT and lab values  - Obtain nutrition services referral as needed   Outcome: Not Progressing      Problem: GENITOURINARY - ADULT  Goal: Maintains or returns to baseline urinary function  INTERVENTIONS:  - Assess urinary function  - Encourage oral fluids to ensure adequate hydration  - Administer IV fluids as ordered to ensure adequate hydration  - Administer ordered medications as needed  - Offer frequent toileting  - Follow urinary retention protocol if ordered   Outcome: Not Progressing    Goal: Absence of urinary retention  INTERVENTIONS:  - Assess patients ability to void and empty bladder  - Monitor I/O  - Bladder scan as needed  - Discuss with physician/AP medications to alleviate retention as needed  - Discuss catheterization for long term situations as appropriate   Outcome: Not Progressing      Problem: METABOLIC, FLUID AND ELECTROLYTES - ADULT  Goal: Electrolytes maintained within normal limits  INTERVENTIONS:  - Monitor labs and assess patient for signs and symptoms of electrolyte imbalances  - Administer electrolyte replacement as ordered  - Monitor response to electrolyte replacements, including repeat lab results as appropriate  - Instruct patient on fluid and nutrition as appropriate   Outcome: Not Progressing    Goal: Fluid balance maintained  INTERVENTIONS:  - Monitor labs and assess for signs and symptoms of volume excess or deficit  - Monitor I/O and WT  - Instruct patient on fluid and nutrition as appropriate   Outcome: Not Progressing    Goal: Glucose maintained within target range  INTERVENTIONS:  - Monitor Blood Glucose as ordered  - Assess for signs and symptoms of hyperglycemia and hypoglycemia  - Administer ordered medications to maintain glucose within target range  - Assess nutritional intake and initiate nutrition service referral as needed   Outcome: Not Progressing      Problem: SKIN/TISSUE INTEGRITY - ADULT  Goal: Skin integrity remains intact  INTERVENTIONS  - Identify patients at risk for skin breakdown  - Assess and monitor skin integrity  - Assess and monitor nutrition and hydration status  - Monitor labs (i e  albumin)  - Assess for incontinence   - Turn and reposition patient  - Assist with mobility/ambulation  - Relieve pressure over bony prominences  - Avoid friction and shearing  - Provide appropriate hygiene as needed including keeping skin clean and dry  - Evaluate need for skin moisturizer/barrier cream  - Collaborate with interdisciplinary team (i e  Nutrition, Rehabilitation, etc )   - Patient/family teaching   Outcome: Progressing    Goal: Incision(s), wounds(s) or drain site(s) healing without S/S of infection  INTERVENTIONS  - Assess and document risk factors for skin impairment   - Assess and document dressing, incision, wound bed, drain sites and surrounding tissue  - Initiate Nutrition services consult and/or wound management as needed   Outcome: Progressing      Problem: HEMATOLOGIC - ADULT  Goal: Maintains hematologic stability  INTERVENTIONS  - Assess for signs and symptoms of bleeding or hemorrhage  - Monitor labs  - Administer supportive blood products/factors as ordered and appropriate   Outcome: Progressing      Problem: MUSCULOSKELETAL - ADULT  Goal: Maintain or return mobility to safest level of function  INTERVENTIONS:  - Assess patient's ability to carry out ADLs; assess patient's baseline for ADL function and identify physical deficits which impact ability to perform ADLs (bathing, care of mouth/teeth, toileting, grooming, dressing, etc )  - Assess/evaluate cause of self-care deficits   - Assess range of motion  - Assess patient's mobility; develop plan if impaired  - Assess patient's need for assistive devices and provide as appropriate  - Encourage maximum independence but intervene and supervise when necessary  - Involve family in performance of ADLs  - Assess for home care needs following discharge   - Request OT consult to assist with ADL evaluation and planning for discharge  - Provide patient education as appropriate   Outcome: Progressing      Problem: DISCHARGE PLANNING - CARE MANAGEMENT  Goal: Discharge to post-acute care or home with appropriate resources  INTERVENTIONS:  - Conduct assessment to determine patient/family and health care team treatment goals, and need for post-acute services based on payer coverage, community resources, and patient preferences, and barriers to discharge  - Address psychosocial, clinical, and financial barriers to discharge as identified in assessment in conjunction with the patient/family and health care team  - Arrange appropriate level of post-acute services according to patient's   needs and preference and payer coverage in collaboration with the physician and health care team  - Communicate with and update the patient/family, physician, and health care team regarding progress on the discharge plan  - Arrange appropriate transportation to post-acute venues   Outcome: Progressing

## 2017-09-24 NOTE — PROGRESS NOTES
Patient exhibiting audible wheezing and labored breathing with complaints of sob  spo2 still holding at 95% on ra  Will call for breathing treatment

## 2017-09-24 NOTE — PROGRESS NOTES
Repeat accucheck after amp D50 and another 1/2 amp D50 was 137  Dr Chikis Davey made aware and will order another amp D50 to be given

## 2017-09-24 NOTE — PROGRESS NOTES
Yue Stark was 67, Dr Cheyenne Palomares made aware and will hold insulin drip and give amp D50% and recheck glucose in 15 minutes

## 2017-09-24 NOTE — PROGRESS NOTES
Dr Gabriel Perez notified of recent BMP labwork results  Also notified of insulin gtt running at 0 5 units/hr per DKA protocol  Instructed by Dr Gabriel Perez to give 1 bag of IVF with sodium bicarb

## 2017-09-24 NOTE — DISCHARGE SUMMARY
Addendum to existing discharge summary 9/23/17    Accepting physician Dr Yadiel Walsh at the [facility]

## 2017-09-24 NOTE — PROGRESS NOTES
Phone call received from [facility] [place], update on pt given to [employer]

## 2017-09-24 NOTE — PROGRESS NOTES
BMP lab results reviewed with Dr Philip Canseco, 22:00 blood sugar was 85, and insulin gtt rate changed to 0 125 units/hr per protocol  Instructed by Dr Philip Canseco to hold insulin gtt for now, and continue monitoring blood sugar q1hr

## 2017-09-24 NOTE — PROGRESS NOTES
Pt c/o wheezing, increased wheezing noted  SpO2 97% on room air  5 am blood sugar 321  Dr Dumont Trang notified, new orders received  Restart insulin gtt, decrease rate of IVF D5NSS to 150ml/hr  Respiratory therapist in to see pt, breathing tx administered

## 2017-09-24 NOTE — SOCIAL WORK
The patient is on the list for transfer to the [facility] as that is where he had the transplant of the kidney done he is awaiting a bed

## 2017-09-24 NOTE — PROGRESS NOTES
Dr Becky Saunders called and informed me that she called [place] and spoke with physicians  Will hopefully be awaiting call for transfer of patient today

## 2017-09-24 NOTE — PROGRESS NOTES
Progress Note - Nephrology   Carey White 51 y o  male MRN: [id number]  Unit/Bed#:  Encounter: [id number]    A/P:  1  MIKE on top of CKD due to volume depletion, sepsis, with possible Renal Transplant rejection                        Follow up urine Na/Cr, continue with aggressive volume expansion  Reassess labs in a few hours  If there is not a continued improvement in parameters we will need to consider transferring the patient to a higher level of care  With respect to his transplant, only one RI is slightly elevated, we will keep this in mind as we progress over the course of the day  9/24: Cr improved, likely a combination of improvement in renal function as well as dilutional effect  Patient has a history of urinary retention, PVR were noted to be elevated, patient finally allowed for a straight cath last night with over 400 ml out  He has urinated twice since last night for about 400 ml, but has gotten about 1 25 L in the meantime  Check PVR this AM and straight cath if PVR is greater than 300 ml (patient not agreeable with lesser amount)  2  CKD III with baseline Cr ~1 5 mg/dL  3  Immunosuppression s/p Renal Transplant                        As mentioned above, one RI is slightly elevated, it may be a possible error in reading due to Kussmaul breathing  Continue with tacrolimus, hold MMF for now, start hydrocortisone 100mg IV TID for potential adrenal insufficiency  5  Hypotension -multifactorial, volume depletion and adrenal insufficiency                        Continue with volume expansion, start IV hydrocortisone   9/24: BPs better, continue with maintenance fluid but transition to D5 1/2NS, pls refer below to reasoning  Continue with hydrocortisone, patient is adequately volume resuscitated at this time  6  DKA                         Continue with insulin gtt, patient now on D5NS due to serum glucose of about 200 g/dL     9/24: Patient's BS has increased potentially due to underlying clinical process, however, he was placed on a bicarb gtt over night with D5W 150 mEq/L of bicarb at 100 ml/hr + D5NS at 150 ml/hr now with BS just below 400 g/dL  I will stop bicarb gtt as it is not likely of benefit, continue with D5 but change the tonicity to 1/2NS at 150 ml/hr, increase insulin gtt to properly address current hyperglycemia and better address ketosis  I would not transition to straight NS or 1/2NS at this time anticipating decrease in glucose and need to maintain high insulin levels  7  Osteomyelitis probable                        We will need to defer MRI this morning due to critical state of affairs for this patient  Either way, at this time, no change in treatment on the MRI confirming for or against osteomyelitis  9/24: Patient's underlying cause for DKA is likely osteo of the right foot, his last surgery removed hardware  Will defer to surgical colleagues regarding best course of action  8  SupraTx INR   After discussion with hospitalist, patient will receive vitK, DIC work up to be initiated  9  Pulmonary Edema -likely   Check portable CXR, will give a dose of IV lasix this AM   Patient was experiencing bronchospasm earlier this AM and was given a neb Tx with some improvement  10  Hypomagnesemia   4gr IV magnesium ordered this morning        Follow up reason for today's visit: MIKE/CKD/immunosuppression management/electrolyte disorder    Patient Active Problem List   Diagnosis    Osteomyelitis    Foot pain    Hypertension    Type 1 diabetes mellitus    Renal transplant, status post    Hyperkalemia    MIKE (acute kidney injury)    Osteomyelitis    Poor circulation    Closed fracture of distal end of right tibia with routine healing    Elevated platelet count    Chronic kidney disease, stage IV (severe)    Chronic osteomyelitis of tibia    Immunosuppression    Hypertension    DKA (diabetic ketoacidoses)    Elevated troponin    Diarrhea    Elevated INR    Cellulitis of ankle    Non-ST elevation myocardial infarction (NSTEMI), type 2         Subjective:    Patient claims that he feels "well", NPO, for transfer to [facility]  Objective:     Vitals: Blood pressure 126/87, pulse 88, temperature 98 2 °F (36 8 °C), temperature source Temporal, resp  rate (!) 25, height 5' 4" (1 626 m), weight 48 5 kg (106 lb 14 8 oz), SpO2 97 %  ,Body mass index is 18 35 kg/m²  Weight (last 2 days)     Date/Time   Weight    09/23/17 0517  48 5 (106 92)                Intake/Output Summary (Last 24 hours) at 09/24/17 0907  Last data filed at 09/24/17 7485   Gross per 24 hour   Intake          8954 62 ml   Output              874 ml   Net          8080 62 ml            Physical Exam: /87   Pulse 88   Temp 98 2 °F (36 8 °C) (Temporal)   Resp (!) 25   Ht 5' 4" (1 626 m)   Wt 48 5 kg (106 lb 14 8 oz)   SpO2 97%   BMI 18 35 kg/m²     General Appearance:    Alert, cooperative, mild respiratory distress   Head:    Normocephalic, without obvious abnormality, atraumatic   Eyes:    Conjunctiva/corneas clear   Ears:    Normal external ears   Nose:   Nares normal, septum midline, mucosa normal, no drainage    or sinus tenderness   Throat:   Lips, mucosa, and tongue normal; teeth and gums normal   Neck:   Supple   Back:     Symmetric, no curvature, ROM normal, no CVA tenderness   Lungs:     Crackles throughout, scattered wheezing   Chest wall:    No tenderness or deformity   Heart:    Regular rate and rhythm, S1 and S2 normal, no murmur, rub   or gallop   Abdomen:     Soft, non-tender, bowel sounds active   Extremities:   Right LE with +2 edema, left LE with trace edema   Skin:   Skin color, texture, turgor normal, no rashes or lesions   Lymph nodes:   Cervical normal   Neurologic:   CNII-XII intact            Lab, Imaging and other studies: I have personally reviewed pertinent labs    CBC: No results found for: WBC, HGB, HCT, MCV, PLT, ADJUSTEDWBC, MCH, MCHC, RDW, MPV, NRBC  CMP: Lab Results   Component Value Date     09/24/2017    K 4 5 09/24/2017     (H) 09/24/2017    CO2 5 (LL) 09/24/2017    ANIONGAP 23 (H) 09/24/2017    BUN 77 (H) 09/24/2017    CREATININE 3 86 (H) 09/24/2017    GLUCOSE 395 (H) 09/24/2017    CALCIUM 6 8 (L) 09/24/2017    EGFR 17 09/24/2017         Results from last 7 days  Lab Units 09/24/17  0704 09/24/17  0517 09/24/17  0410  09/23/17  0516   SODIUM mmol/L 138 137 138  < > 131*   POTASSIUM mmol/L 4 5 4 4 4 2  < > 4 5   CHLORIDE mmol/L 110* 111* 110*  < > 102   CO2 mmol/L 5* <5* 6*  < > <5*   BUN mg/dL 77* 77* 77*  < > 85*   CREATININE mg/dL 3 86* 3 89* 3 89*  < > 4 77*   CALCIUM mg/dL 6 8* 7 0* 6 9*  < > 7 3*   TOTAL PROTEIN g/dL  --   --   --   --  7 0   BILIRUBIN TOTAL mg/dL  --   --   --   --  0 30   ALK PHOS U/L  --   --   --   --  289*   ALT U/L  --   --   --   --  22   AST U/L  --   --   --   --  24   GLUCOSE RANDOM mg/dL 395* 321* 242*  < > 349*   < > = values in this interval not displayed        Phosphorus:   Lab Results   Component Value Date    PHOS 6 0 (H) 09/24/2017     Magnesium:   Lab Results   Component Value Date    MG 1 2 (L) 09/24/2017     Urinalysis: Lab Results   Component Value Date    COLORU Yellow 09/23/2017    CLARITYU Slightly Cloudy 09/23/2017    SPECGRAV 1 025 09/23/2017    PHUR 5 5 09/23/2017    LEUKOCYTESUR Negative 09/23/2017    NITRITE Negative 09/23/2017    PROTEINUA 100 (2+) (A) 09/23/2017    GLUCOSEU Negative 09/23/2017    KETONESU Negative 09/23/2017    BILIRUBINUR Negative 09/23/2017    BLOODU Moderate (A) 09/23/2017     Ionized Calcium: No results found for: CAION  Coagulation: Lab Results   Component Value Date    INR 5 60 (HH) 09/24/2017     Troponin:   Lab Results   Component Value Date    TROPONINI 0 51 (HH) 09/23/2017     ABG: Lab Results   Component Value Date    PHART 7 028 (LL) 09/23/2017    DQZ8ZTF 14 5 (LL) 09/23/2017    PO2ART 148 2 (H) 09/23/2017    NOV4BFR 3 7 (L) 09/23/2017    BEART -25 1 09/23/2017    SOURCE Brachial, Right 09/23/2017     Radiology review:     No new imaging      Current Facility-Administered Medications   Medication Dose Route Frequency    atenolol (TENORMIN) tablet 25 mg  25 mg Oral Daily    cefepime (MAXIPIME) 2,000 mg in dextrose 5 % 50 mL IVPB  2,000 mg Intravenous Q24H    dextrose 5 % and sodium chloride 0 9 % infusion  125 mL/hr Intravenous Continuous    furosemide (LASIX) injection 40 mg  40 mg Intravenous Once    hydrocortisone sodium succinate (PF) (Solu-CORTEF) injection 100 mg  100 mg Intravenous Q8H [address]    insulin detemir (LEVEMIR) subcutaneous injection 10 Units  10 Units Subcutaneous HS    insulin regular (HumuLIN R,NovoLIN R) 1 Units/mL in sodium chloride 0 9 % 100 mL infusion  4 Units/hr Intravenous Continuous    ipratropium (ATROVENT) 0 02 % inhalation solution 0 5 mg  0 5 mg Nebulization TID PRN    levalbuterol (XOPENEX) inhalation solution 1 25 mg  1 25 mg Nebulization TID PRN    magnesium sulfate 2 g/50 mL IVPB (premix) 2 g  2 g Intravenous Q2H    metroNIDAZOLE (FLAGYL) IVPB (premix) 500 mg  500 mg Intravenous Q8H    tacrolimus (PROGRAF) capsule 0 5 mg  0 5 mg Oral BID    vancomycin (VANCOCIN) 1,000 mg in sodium chloride 0 9 % 250 mL IVPB  20 mg/kg Intravenous Q48H     Medications Discontinued During This Encounter   Medication Reason    insulin lispro (HumaLOG) 100 units/mL injection Therapy completed    tamsulosin (FLOMAX) 0 4 mg Therapy completed    sodium chloride 0 9 % bolus 30 mL/kg     sodium chloride (PF) 0 9 % injection 3 mL Patient Transfer    insulin regular (HumuLIN R,NovoLIN R) 1 Units/mL in sodium chloride 0 9 % 080 mL infusion Duplicate order    metroNIDAZOLE (FLAGYL) IVPB (premix) 026 mg Duplicate order    dextrose 5 % and sodium chloride 0 9 % infusion     vancomycin (VANCOCIN) 750 mg in sodium chloride 0 9 % 250 mL IVPB Patient Transfer    vancomycin (VANCOCIN) 750 mg in sodium chloride 0 9 % 250 mL IVPB Dose adjustment    mycophenolate (CELLCEPT) capsule 750 mg     predniSONE tablet 5 mg     influenza inactivated quadrivalent vaccine (FLUARIX) IM injection 0 5 mL     dextrose 5 % and sodium chloride 0 9 % infusion     dextrose 5 % and sodium chloride 0 9 % infusion     magnesium sulfate 4 g/100 mL IVPB (premix) 4 g Alternate therapy    sodium bicarbonate 150 mEq in dextrose 5 % 1,000 mL infusion     sodium chloride 0 9 % infusion        Total additional time as critical care time is 40 min  Total additional non-critical care time is 35 min  Greater than 50% at bedside reviewing clinical situation, plan of care as well as coordination of care      Amarjit Garza DO

## 2017-09-24 NOTE — PROGRESS NOTES
[facility] Progress Note  Patient: Daya Novak 51 y o  male   MRN: [id number]  PCP: Lakshmi Taylor DO  Unit/Bed#:  Encounter: [id number]  Date Of Visit: 09/24/17    Assessment:    Principal Problem:    DKA (diabetic ketoacidoses)  Active Problems:    Foot pain    MIKE (acute kidney injury)    Chronic kidney disease, stage IV (severe)    Chronic osteomyelitis of tibia    Immunosuppression    Elevated troponin    Diarrhea    Elevated INR    Non-ST elevation myocardial infarction (NSTEMI), type 2      Plan:    · DKA, initially improved, however, labs plateaued without reaching acceptable values  This may be due to multiple combined etiologies of the DKA as well as severity of the acidosis  The patient has been accepted by [facility] for further management and we are awaiting an available bed  We will notify patient's father Dipti Matthews at [phone] mobile number once patient is transferred  We will continue to monitor BMP every 2 hours and hourly glucose results, we will continue insulin drip which we will adjust accordingly and IV fluids  We will continue broad spectrum antibiotics as patient's infection is suspected to be culprit of his DKA (osteomyelitis, colitis and/or developing pneumonia)  Will monitor and adjust electrolytes  · Suspected right ankle acute osteomyelitis, we are unable to obtain an MRI due to multiple drips and will therefore continue with the antibiotics, defer further diagnostic studies to the acute care facility where patient will be transferred  Of note, patient did have hardware placed previously, however, this was removed and there is currently no hardware placed there  · MIKE on CKD stage IV s/p renal transplant on chronic immunosuppression, mildly improved from 4 7 to 3 8, baseline 1 7-2  Continue IV fluids, monitoring BMP  · Acute diarrhea, resolved, none since admission, continue empiric treatment with Flagyl, no stool studies were done as no recurrence   Pt's INR elevation may be secondary to associated Vit K deficiency with suppression of gut dean  · Elevated INR, increased from 3 8 to 5 6, may be due gut dean suppression as above, workup for DIC done and so far not consistent with DIC, fibrinogen pending  Otherwise, no clear etiology identified so far  Vit K 10 mg IV given  · NSTEMI type 2 secondary to DKA and severe dehydration as well as suspected infection, will repeat troponin and trend x 3  · Urinary retention, acute on chronic, patient is refusing krishnan, will straight cath for >300, monitor I/Os closely  Patient did receive one dose of Lasix 40 mg IV today due to clinical signs of fluid overload, also evident on xray  · Abnormal chest xray, fluid overload vs pneumonia as evident with a hazy right lung base infiltrate  Patient on broad spectrum antibiotics (Vancomycin and Cefepime) which we will obviously continue  Lasix was given, as above  VTE Pharmacologic Prophylaxis:   Pharmacologic: None, supratherapeutic INR  Mechanical VTE Prophylaxis in Place: No    Patient Centered Rounds: I have performed bedside rounds with nursing staff today  Discussions with Specialists or Other Care Team Provider: Nephrology Dr Lia Foreman    Education and Discussions with Family / Patient: Patient and father    Time Spent for Care: 45 minutes  More than 50% of total time spent on counseling and coordination of care as described above  Current Length of Stay: 1 day(s)    Current Patient Status: Inpatient   Certification Statement: The patient will continue to require additional inpatient hospital stay due to DKA    Discharge Plan / Estimated Discharge Date: Today, to acute care at [facility]    Code Status: Prior      Subjective:   Patient seen and examined with father at bedside  Patient appears tired and tachypneic but states he feels better, father also reports that in his opinion patient appears better   The patient states his right ankle pain is better, he has not had a recurrence of diarrhea, he denies chest pain, palpitations, nausea or vomiting  Patient has not been urinating and urinary straight cath has been used  Objective:     Vitals:   Temp (24hrs), Av 7 °F (36 5 °C), Min:97 2 °F (36 2 °C), Max:98 2 °F (36 8 °C)    HR:  [78-93] 93  Resp:  [21-32] 25  BP: (109-152)/(55-87) 109/66  SpO2:  [94 %-100 %] 94 %  Body mass index is 18 35 kg/m²  Input and Output Summary (last 24 hours): Intake/Output Summary (Last 24 hours) at 17 1331  Last data filed at 17 1140   Gross per 24 hour   Intake          7596 29 ml   Output             1453 ml   Net          6143 29 ml       Physical Exam:     Physical Exam   Constitutional: He is oriented to person, place, and time  He has a sickly appearance  HENT:   Mouth/Throat: Oropharynx is clear and moist    Neck: No JVD present  Cardiovascular: Normal rate and regular rhythm  Exam reveals no gallop and no friction rub  No murmur heard  Pulmonary/Chest: He is in respiratory distress  He has no wheezes  He has no rales  Abdominal: Soft  He exhibits no distension  There is no tenderness  There is no rebound  Musculoskeletal:   Right ankle mild tenderness and erythema, apparent edema   Neurological: He is alert and oriented to person, place, and time  Skin: Skin is warm and dry  Psychiatric: He has a normal mood and affect         Additional Data:     Labs:      Results from last 7 days  Lab Units 17  1037 17  0516   WBC Thousand/uL 6 12 5 83   HEMOGLOBIN g/dL 8 3* 9 5*   HEMATOCRIT % 25 7* 29 8*   PLATELETS Thousands/uL 389 422*   NEUTROS PCT %  --  72   LYMPHS PCT %  --  11*   LYMPHO PCT % 1*  --    MONOS PCT %  --  17*   MONO PCT MAN % 2*  --    EOS PCT %  --  0   EOSINO PCT MANUAL % 0  --        Results from last 7 days  Lab Units 17  1045  17  0516   SODIUM mmol/L 139  < > 131*   POTASSIUM mmol/L 4 0  < > 4 5   CHLORIDE mmol/L 111*  < > 102   CO2 mmol/L 6*  < > <5*   BUN mg/dL 78*  < > 85*   CREATININE mg/dL 3 98*  < > 4 77*   CALCIUM mg/dL 6 9*  < > 7 3*   TOTAL PROTEIN g/dL  --   --  7 0   BILIRUBIN TOTAL mg/dL  --   --  0 30   ALK PHOS U/L  --   --  289*   ALT U/L  --   --  22   AST U/L  --   --  24   GLUCOSE RANDOM mg/dL 398*  < > 349*   < > = values in this interval not displayed  Results from last 7 days  Lab Units 09/24/17  0517   INR  5 60*       * I Have Reviewed All Lab Data Listed Above  * Additional Pertinent Lab Tests Reviewed:  [facility]res[facility] Admission Reviewed    Imaging:    Imaging Reports Reviewed Today Include: chest xray    Recent Cultures (last 7 days):         Last 24 Hours Medication List:     atenolol 25 mg Oral Daily   cefepime 2,000 mg Intravenous Q24H   hydrocortisone sodium succinate 100 mg Intravenous Q8H [address]   insulin detemir 10 Units Subcutaneous HS   magnesium sulfate 2 g Intravenous Q2H   metroNIDAZOLE 500 mg Intravenous Q8H   tacrolimus 0 5 mg Oral BID   vancomycin (VANCOCIN) IVPB in 250 mL (over 90 min) 20 mg/kg Intravenous Q48H        Today, Patient Was Seen By: Tawny Guerra MD

## 2017-09-24 NOTE — PLAN OF CARE
Problem: NEUROSENSORY - ADULT  Goal: Achieves stable or improved neurological status  INTERVENTIONS  - Monitor and report changes in neurological status  - Initiate measures to prevent increased intracranial pressure  - Maintain blood pressure and fluid volume within ordered parameters to optimize cerebral perfusion  - Monitor temperature, glucose, and sodium or any other associated labs  Initiate appropriate interventions as ordered  - Monitor for seizure activity   - Administer anti-seizure medications as ordered   Outcome: Progressing      Problem: CARDIOVASCULAR - ADULT  Goal: Maintains optimal cardiac output and hemodynamic stability  INTERVENTIONS:  - Monitor I/O, vital signs and rhythm  - Monitor for S/S and trends of decreased cardiac output i e  bleeding, hypotension  - Administer and titrate ordered vasoactive medications to optimize hemodynamic stability  - Assess quality of pulses, skin color and temperature  - Assess for signs of decreased coronary artery perfusion - ex   Angina  - Instruct patient to report change in severity of symptoms   Outcome: Progressing      Problem: RESPIRATORY - ADULT  Goal: Achieves optimal ventilation and oxygenation  INTERVENTIONS:  - Assess for changes in respiratory status  - Assess for changes in mentation and behavior  - Position to facilitate oxygenation and minimize respiratory effort  - Oxygen administration by appropriate delivery method based on oxygen saturation (per order) or ABGs  - Initiate smoking cessation education as indicated  - Encourage broncho-pulmonary hygiene including cough, deep breathe, Incentive Spirometry  - Assess the need for suctioning and aspirate as needed  - Assess and instruct to report SOB or any respiratory difficulty  - Respiratory Therapy support as indicated   Outcome: Progressing      Problem: GASTROINTESTINAL - ADULT  Goal: Minimal or absence of nausea and/or vomiting  INTERVENTIONS:  - Administer IV fluids as ordered to ensure adequate hydration  - Maintain NPO status until nausea and vomiting are resolved  - Nasogastric tube as ordered  - Administer ordered antiemetic medications as needed  - Provide nonpharmacologic comfort measures as appropriate  - Advance diet as tolerated, if ordered  - Nutrition services referral to assist patient with adequate nutrition and appropriate food choices   Outcome: Progressing    Goal: Maintains or returns to baseline bowel function  INTERVENTIONS:  - Assess bowel function  - Encourage oral fluids to ensure adequate hydration  - Administer IV fluids as ordered to ensure adequate hydration  - Administer ordered medications as needed  - Encourage mobilization and activity  - Nutrition services referral to assist patient with appropriate food choices   Outcome: Not Progressing    Goal: Maintains adequate nutritional intake  INTERVENTIONS:  - Monitor percentage of each meal consumed  - Identify factors contributing to decreased intake, treat as appropriate  - Assist with meals as needed  - Monitor I&O, WT and lab values  - Obtain nutrition services referral as needed   Outcome: Not Progressing      Problem: GENITOURINARY - ADULT  Goal: Maintains or returns to baseline urinary function  INTERVENTIONS:  - Assess urinary function  - Encourage oral fluids to ensure adequate hydration  - Administer IV fluids as ordered to ensure adequate hydration  - Administer ordered medications as needed  - Offer frequent toileting  - Follow urinary retention protocol if ordered   Outcome: Not Progressing    Goal: Absence of urinary retention  INTERVENTIONS:  - Assess patients ability to void and empty bladder  - Monitor I/O  - Bladder scan as needed  - Discuss with physician/AP medications to alleviate retention as needed  - Discuss catheterization for long term situations as appropriate   Outcome: Not Progressing      Problem: METABOLIC, FLUID AND ELECTROLYTES - ADULT  Goal: Electrolytes maintained within normal limits  INTERVENTIONS:  - Monitor labs and assess patient for signs and symptoms of electrolyte imbalances  - Administer electrolyte replacement as ordered  - Monitor response to electrolyte replacements, including repeat lab results as appropriate  - Instruct patient on fluid and nutrition as appropriate   Outcome: Progressing    Goal: Fluid balance maintained  INTERVENTIONS:  - Monitor labs and assess for signs and symptoms of volume excess or deficit  - Monitor I/O and WT  - Instruct patient on fluid and nutrition as appropriate   Outcome: Progressing    Goal: Glucose maintained within target range  INTERVENTIONS:  - Monitor Blood Glucose as ordered  - Assess for signs and symptoms of hyperglycemia and hypoglycemia  - Administer ordered medications to maintain glucose within target range  - Assess nutritional intake and initiate nutrition service referral as needed   Outcome: Progressing      Problem: SKIN/TISSUE INTEGRITY - ADULT  Goal: Skin integrity remains intact  INTERVENTIONS  - Identify patients at risk for skin breakdown  - Assess and monitor skin integrity  - Assess and monitor nutrition and hydration status  - Monitor labs (i e  albumin)  - Assess for incontinence   - Turn and reposition patient  - Assist with mobility/ambulation  - Relieve pressure over bony prominences  - Avoid friction and shearing  - Provide appropriate hygiene as needed including keeping skin clean and dry  - Evaluate need for skin moisturizer/barrier cream  - Collaborate with interdisciplinary team (i e  Nutrition, Rehabilitation, etc )   - Patient/family teaching   Outcome: Progressing    Goal: Incision(s), wounds(s) or drain site(s) healing without S/S of infection  INTERVENTIONS  - Assess and document risk factors for skin impairment   - Assess and document dressing, incision, wound bed, drain sites and surrounding tissue  - Initiate Nutrition services consult and/or wound management as needed   Outcome: Progressing      Problem: HEMATOLOGIC - ADULT  Goal: Maintains hematologic stability  INTERVENTIONS  - Assess for signs and symptoms of bleeding or hemorrhage  - Monitor labs  - Administer supportive blood products/factors as ordered and appropriate   Outcome: Progressing      Problem: MUSCULOSKELETAL - ADULT  Goal: Maintain or return mobility to safest level of function  INTERVENTIONS:  - Assess patient's ability to carry out ADLs; assess patient's baseline for ADL function and identify physical deficits which impact ability to perform ADLs (bathing, care of mouth/teeth, toileting, grooming, dressing, etc )  - Assess/evaluate cause of self-care deficits   - Assess range of motion  - Assess patient's mobility; develop plan if impaired  - Assess patient's need for assistive devices and provide as appropriate  - Encourage maximum independence but intervene and supervise when necessary  - Involve family in performance of ADLs  - Assess for home care needs following discharge   - Request OT consult to assist with ADL evaluation and planning for discharge  - Provide patient education as appropriate   Outcome: Progressing

## 2017-09-24 NOTE — PROGRESS NOTES
Dr Sendy Jara called and made aware of accucheck of 145  Will decrease insulin drip to 2 units/hr  Also made aware of potassium of 3 4, will order supplemental potassium

## 2017-09-25 ENCOUNTER — APPOINTMENT (INPATIENT)
Dept: RADIOLOGY | Facility: HOSPITAL | Age: 47
DRG: 637 | End: 2017-09-25
Payer: COMMERCIAL

## 2017-09-25 ENCOUNTER — ANESTHESIA EVENT (INPATIENT)
Dept: ICU | Facility: HOSPITAL | Age: 47
DRG: 637 | End: 2017-09-25
Payer: COMMERCIAL

## 2017-09-25 ENCOUNTER — APPOINTMENT (INPATIENT)
Dept: NON INVASIVE DIAGNOSTICS | Facility: HOSPITAL | Age: 47
DRG: 637 | End: 2017-09-25
Payer: COMMERCIAL

## 2017-09-25 ENCOUNTER — ANESTHESIA (INPATIENT)
Dept: ICU | Facility: HOSPITAL | Age: 47
DRG: 637 | End: 2017-09-25
Payer: COMMERCIAL

## 2017-09-25 VITALS
TEMPERATURE: 97.5 F | HEART RATE: 73 BPM | BODY MASS INDEX: 18.25 KG/M2 | SYSTOLIC BLOOD PRESSURE: 86 MMHG | WEIGHT: 106.92 LBS | DIASTOLIC BLOOD PRESSURE: 48 MMHG | OXYGEN SATURATION: 100 % | HEIGHT: 64 IN | RESPIRATION RATE: 25 BRPM

## 2017-09-25 LAB
ACANTHOCYTES BLD QL SMEAR: PRESENT
ANION GAP SERPL CALCULATED.3IONS-SCNC: 20 MMOL/L (ref 4–13)
ANION GAP SERPL CALCULATED.3IONS-SCNC: 21 MMOL/L (ref 4–13)
ANION GAP SERPL CALCULATED.3IONS-SCNC: 22 MMOL/L (ref 4–13)
ANISOCYTOSIS BLD QL SMEAR: PRESENT
ARTERIAL PATENCY WRIST A: YES
ARTERIAL PATENCY WRIST A: YES
BASE EXCESS BLDA CALC-SCNC: -23 MMOL/L (ref -2–3)
BASE EXCESS BLDA CALC-SCNC: -24 MMOL/L
BASE EXCESS BLDA CALC-SCNC: -24.9 MMOL/L
BASE EXCESS BLDA CALC-SCNC: -25.4 MMOL/L
BASOPHILS # BLD MANUAL: 0 THOUSAND/UL (ref 0–0.1)
BASOPHILS NFR MAR MANUAL: 0 % (ref 0–1)
BODY TEMPERATURE: 97.5 DEGREES FEHRENHEIT
BUN SERPL-MCNC: 79 MG/DL (ref 5–25)
BUN SERPL-MCNC: 79 MG/DL (ref 5–25)
BUN SERPL-MCNC: 80 MG/DL (ref 5–25)
BUN SERPL-MCNC: 80 MG/DL (ref 5–25)
BUN SERPL-MCNC: 81 MG/DL (ref 5–25)
BURR CELLS BLD QL SMEAR: PRESENT
CA-I BLD-SCNC: 1.24 MMOL/L (ref 1.12–1.32)
CALCIUM SERPL-MCNC: 6.6 MG/DL (ref 8.3–10.1)
CALCIUM SERPL-MCNC: 6.9 MG/DL (ref 8.3–10.1)
CALCIUM SERPL-MCNC: 7.1 MG/DL (ref 8.3–10.1)
CALCIUM SERPL-MCNC: 7.2 MG/DL (ref 8.3–10.1)
CALCIUM SERPL-MCNC: 7.2 MG/DL (ref 8.3–10.1)
CHLORIDE SERPL-SCNC: 111 MMOL/L (ref 100–108)
CO2 SERPL-SCNC: 5 MMOL/L (ref 21–32)
CO2 SERPL-SCNC: 6 MMOL/L (ref 21–32)
CO2 SERPL-SCNC: 6 MMOL/L (ref 21–32)
CO2 SERPL-SCNC: 7 MMOL/L (ref 21–32)
CO2 SERPL-SCNC: 7 MMOL/L (ref 21–32)
CREAT SERPL-MCNC: 3.7 MG/DL (ref 0.6–1.3)
CREAT SERPL-MCNC: 3.8 MG/DL (ref 0.6–1.3)
CREAT SERPL-MCNC: 3.81 MG/DL (ref 0.6–1.3)
CREAT SERPL-MCNC: 3.89 MG/DL (ref 0.6–1.3)
CREAT SERPL-MCNC: 3.91 MG/DL (ref 0.6–1.3)
EOSINOPHIL # BLD MANUAL: 0.08 THOUSAND/UL (ref 0–0.4)
EOSINOPHIL NFR BLD MANUAL: 1 % (ref 0–6)
ERYTHROCYTE [DISTWIDTH] IN BLOOD BY AUTOMATED COUNT: 17.5 % (ref 11.6–15.1)
GFR SERPL CREATININE-BSD FRML MDRD: 17 ML/MIN/1.73SQ M
GFR SERPL CREATININE-BSD FRML MDRD: 17 ML/MIN/1.73SQ M
GFR SERPL CREATININE-BSD FRML MDRD: 18 ML/MIN/1.73SQ M
GLUCOSE SERPL-MCNC: 151 MG/DL (ref 65–140)
GLUCOSE SERPL-MCNC: 157 MG/DL (ref 65–140)
GLUCOSE SERPL-MCNC: 162 MG/DL (ref 65–140)
GLUCOSE SERPL-MCNC: 202 MG/DL (ref 65–140)
GLUCOSE SERPL-MCNC: 204 MG/DL (ref 65–140)
GLUCOSE SERPL-MCNC: 218 MG/DL (ref 65–140)
GLUCOSE SERPL-MCNC: 226 MG/DL (ref 65–140)
GLUCOSE SERPL-MCNC: 227 MG/DL (ref 65–140)
GLUCOSE SERPL-MCNC: 232 MG/DL (ref 65–140)
GLUCOSE SERPL-MCNC: 264 MG/DL (ref 65–140)
GLUCOSE SERPL-MCNC: 273 MG/DL (ref 65–140)
GLUCOSE SERPL-MCNC: 283 MG/DL (ref 65–140)
GLUCOSE SERPL-MCNC: 287 MG/DL (ref 65–140)
GLUCOSE SERPL-MCNC: 293 MG/DL (ref 65–140)
GLUCOSE SERPL-MCNC: 293 MG/DL (ref 65–140)
GLUCOSE SERPL-MCNC: 302 MG/DL (ref 65–140)
GLUCOSE SERPL-MCNC: 306 MG/DL (ref 65–140)
GLUCOSE SERPL-MCNC: 312 MG/DL (ref 65–140)
GLUCOSE SERPL-MCNC: 319 MG/DL (ref 65–140)
HCO3 BLDA-SCNC: 4.6 MMOL/L (ref 22–28)
HCO3 BLDA-SCNC: 4.8 MMOL/L (ref 22–28)
HCO3 BLDA-SCNC: 5.2 MMOL/L (ref 22–28)
HCO3 BLDA-SCNC: 6.5 MMOL/L (ref 22–28)
HCT VFR BLD AUTO: 23.1 % (ref 36.5–49.3)
HCT VFR BLD CALC: 22 % (ref 36.5–49.3)
HEMOCCULT STL QL: POSITIVE
HGB BLD-MCNC: 7.5 G/DL (ref 12–17)
HGB BLDA-MCNC: 7.5 G/DL (ref 12–17)
HOROWITZ INDEX BLDA+IHG-RTO: 80 MM[HG]
HOROWITZ INDEX BLDA+IHG-RTO: 80 MM[HG]
INR PPP: 1.57 (ref 0.86–1.16)
INR PPP: 5.75 (ref 0.86–1.16)
LYMPHOCYTES # BLD AUTO: 0.32 THOUSAND/UL (ref 0.6–4.47)
LYMPHOCYTES # BLD AUTO: 4 % (ref 14–44)
MAGNESIUM SERPL-MCNC: 1.9 MG/DL (ref 1.6–2.6)
MCH RBC QN AUTO: 26.1 PG (ref 26.8–34.3)
MCHC RBC AUTO-ENTMCNC: 32.5 G/DL (ref 31.4–37.4)
MCV RBC AUTO: 81 FL (ref 82–98)
MONOCYTES # BLD AUTO: 0.24 THOUSAND/UL (ref 0–1.22)
MONOCYTES NFR BLD: 3 % (ref 4–12)
MRSA NOSE QL CULT: NORMAL
NEUTROPHILS # BLD MANUAL: 7.37 THOUSAND/UL (ref 1.85–7.62)
NEUTS BAND NFR BLD MANUAL: 5 % (ref 0–8)
NEUTS SEG NFR BLD AUTO: 87 % (ref 43–75)
O2 CT BLDA-SCNC: 10.3 ML/DL (ref 16–23)
O2 CT BLDA-SCNC: 10.4 ML/DL (ref 16–23)
O2 CT BLDA-SCNC: 11.5 ML/DL (ref 16–23)
OXYHGB MFR BLDA: 82.4 % (ref 94–97)
OXYHGB MFR BLDA: 94 % (ref 94–97)
OXYHGB MFR BLDA: 97.2 % (ref 94–97)
PCO2 BLD: 24.8 MM HG (ref 36–44)
PCO2 BLD: 7 MMOL/L (ref 21–32)
PCO2 BLDA: 18.1 MM HG (ref 36–44)
PCO2 BLDA: 21.1 MM HG (ref 36–44)
PCO2 BLDA: 23.3 MM HG (ref 36–44)
PEEP RESPIRATORY: 5 CM[H2O]
PEEP RESPIRATORY: ABNORMAL CM[H2O]
PH BLD: 7.03 [PH] (ref 7.35–7.45)
PH BLDA: 6.96 [PH] (ref 7.35–7.45)
PH BLDA: 6.96 [PH] (ref 7.35–7.45)
PH BLDA: 7.04 [PH] (ref 7.35–7.45)
PLATELET # BLD AUTO: 379 THOUSANDS/UL (ref 149–390)
PLATELET BLD QL SMEAR: ADEQUATE
PMV BLD AUTO: 8.7 FL (ref 8.9–12.7)
PO2 BLD: 97 MM HG (ref 75–129)
PO2 BLDA: 103.4 MM HG (ref 75–129)
PO2 BLDA: 171.1 MM HG (ref 75–129)
PO2 BLDA: 58.4 MM HG (ref 75–129)
POIKILOCYTOSIS BLD QL SMEAR: PRESENT
POTASSIUM BLD-SCNC: 3.3 MMOL/L (ref 3.5–5.3)
POTASSIUM SERPL-SCNC: 3.5 MMOL/L (ref 3.5–5.3)
POTASSIUM SERPL-SCNC: 3.9 MMOL/L (ref 3.5–5.3)
POTASSIUM SERPL-SCNC: 4 MMOL/L (ref 3.5–5.3)
POTASSIUM SERPL-SCNC: 4.2 MMOL/L (ref 3.5–5.3)
POTASSIUM SERPL-SCNC: 4.3 MMOL/L (ref 3.5–5.3)
PROTHROMBIN TIME: 18.7 SECONDS (ref 12.1–14.4)
PROTHROMBIN TIME: 52.9 SECONDS (ref 12.1–14.4)
RBC # BLD AUTO: 2.87 MILLION/UL (ref 3.88–5.62)
RV AG STL QL: NEGATIVE
SAO2 % BLD FROM PO2: 93 % (ref 95–98)
SODIUM BLD-SCNC: 139 MMOL/L (ref 136–145)
SODIUM SERPL-SCNC: 138 MMOL/L (ref 136–145)
SODIUM SERPL-SCNC: 138 MMOL/L (ref 136–145)
SODIUM SERPL-SCNC: 139 MMOL/L (ref 136–145)
SPECIMEN SOURCE: ABNORMAL
TACROLIMUS BLD LC/MS/MS-MCNC: 10.8 NG/ML (ref 2–20)
TOTAL CELLS COUNTED SPEC: 100
VENT AC: 20
VENT AC: 20
VENT- AC: AC
VENT- AC: AC
VT SETTING VENT: 450 ML
VT SETTING VENT: 450 ML
WBC # BLD AUTO: 8.01 THOUSAND/UL (ref 4.31–10.16)

## 2017-09-25 PROCEDURE — 94760 N-INVAS EAR/PLS OXIMETRY 1: CPT

## 2017-09-25 PROCEDURE — 93306 TTE W/DOPPLER COMPLETE: CPT

## 2017-09-25 PROCEDURE — 94664 DEMO&/EVAL PT USE INHALER: CPT

## 2017-09-25 PROCEDURE — 94770 HB EXHALED CARBON DIOXIDE TEST: CPT

## 2017-09-25 PROCEDURE — 82805 BLOOD GASES W/O2 SATURATION: CPT | Performed by: INTERNAL MEDICINE

## 2017-09-25 PROCEDURE — 0BH18EZ INSERTION OF ENDOTRACHEAL AIRWAY INTO TRACHEA, VIA NATURAL OR ARTIFICIAL OPENING ENDOSCOPIC: ICD-10-PCS | Performed by: FAMILY MEDICINE

## 2017-09-25 PROCEDURE — 71010 HB CHEST X-RAY 1 VIEW FRONTAL (PORTABLE): CPT

## 2017-09-25 PROCEDURE — 5A1935Z RESPIRATORY VENTILATION, LESS THAN 24 CONSECUTIVE HOURS: ICD-10-PCS | Performed by: FAMILY MEDICINE

## 2017-09-25 PROCEDURE — 85014 HEMATOCRIT: CPT

## 2017-09-25 PROCEDURE — 36600 WITHDRAWAL OF ARTERIAL BLOOD: CPT

## 2017-09-25 PROCEDURE — 85027 COMPLETE CBC AUTOMATED: CPT | Performed by: FAMILY MEDICINE

## 2017-09-25 PROCEDURE — 82272 OCCULT BLD FECES 1-3 TESTS: CPT | Performed by: FAMILY MEDICINE

## 2017-09-25 PROCEDURE — 84132 ASSAY OF SERUM POTASSIUM: CPT

## 2017-09-25 PROCEDURE — 87425 ROTAVIRUS AG IA: CPT | Performed by: INTERNAL MEDICINE

## 2017-09-25 PROCEDURE — 84295 ASSAY OF SERUM SODIUM: CPT

## 2017-09-25 PROCEDURE — 83735 ASSAY OF MAGNESIUM: CPT | Performed by: INTERNAL MEDICINE

## 2017-09-25 PROCEDURE — 80048 BASIC METABOLIC PNL TOTAL CA: CPT | Performed by: FAMILY MEDICINE

## 2017-09-25 PROCEDURE — 82330 ASSAY OF CALCIUM: CPT

## 2017-09-25 PROCEDURE — 82947 ASSAY GLUCOSE BLOOD QUANT: CPT

## 2017-09-25 PROCEDURE — 82948 REAGENT STRIP/BLOOD GLUCOSE: CPT

## 2017-09-25 PROCEDURE — 94002 VENT MGMT INPAT INIT DAY: CPT

## 2017-09-25 PROCEDURE — 94640 AIRWAY INHALATION TREATMENT: CPT

## 2017-09-25 PROCEDURE — 85007 BL SMEAR W/DIFF WBC COUNT: CPT | Performed by: FAMILY MEDICINE

## 2017-09-25 PROCEDURE — 31500 INSERT EMERGENCY AIRWAY: CPT

## 2017-09-25 PROCEDURE — 82805 BLOOD GASES W/O2 SATURATION: CPT | Performed by: FAMILY MEDICINE

## 2017-09-25 PROCEDURE — 85610 PROTHROMBIN TIME: CPT | Performed by: FAMILY MEDICINE

## 2017-09-25 PROCEDURE — 82803 BLOOD GASES ANY COMBINATION: CPT

## 2017-09-25 RX ORDER — LEVALBUTEROL 1.25 MG/.5ML
1.25 SOLUTION, CONCENTRATE RESPIRATORY (INHALATION)
Status: DISCONTINUED | OUTPATIENT
Start: 2017-09-25 | End: 2017-09-25 | Stop reason: HOSPADM

## 2017-09-25 RX ORDER — PROPOFOL 10 MG/ML
INJECTION, EMULSION INTRAVENOUS AS NEEDED
Status: DISCONTINUED | OUTPATIENT
Start: 2017-09-25 | End: 2017-09-25 | Stop reason: HOSPADM

## 2017-09-25 RX ORDER — PROPOFOL 10 MG/ML
5-50 INJECTION, EMULSION INTRAVENOUS
Status: DISCONTINUED | OUTPATIENT
Start: 2017-09-25 | End: 2017-09-25 | Stop reason: HOSPADM

## 2017-09-25 RX ORDER — FUROSEMIDE 10 MG/ML
40 INJECTION INTRAMUSCULAR; INTRAVENOUS ONCE
Status: COMPLETED | OUTPATIENT
Start: 2017-09-25 | End: 2017-09-25

## 2017-09-25 RX ORDER — MIDAZOLAM HYDROCHLORIDE 1 MG/ML
INJECTION INTRAMUSCULAR; INTRAVENOUS
Status: COMPLETED
Start: 2017-09-25 | End: 2017-09-25

## 2017-09-25 RX ORDER — MIDAZOLAM HYDROCHLORIDE 1 MG/ML
5 INJECTION INTRAMUSCULAR; INTRAVENOUS ONCE
Status: COMPLETED | OUTPATIENT
Start: 2017-09-25 | End: 2017-09-25

## 2017-09-25 RX ORDER — PROPOFOL 10 MG/ML
INJECTION, EMULSION INTRAVENOUS
Status: COMPLETED
Start: 2017-09-25 | End: 2017-09-25

## 2017-09-25 RX ORDER — SUCCINYLCHOLINE CHLORIDE 20 MG/ML
INJECTION INTRAMUSCULAR; INTRAVENOUS AS NEEDED
Status: DISCONTINUED | OUTPATIENT
Start: 2017-09-25 | End: 2017-09-25 | Stop reason: HOSPADM

## 2017-09-25 RX ADMIN — FUROSEMIDE 40 MG: 10 INJECTION, SOLUTION INTRAMUSCULAR; INTRAVENOUS at 08:47

## 2017-09-25 RX ADMIN — METRONIDAZOLE 500 MG: 500 INJECTION, SOLUTION INTRAVENOUS at 08:28

## 2017-09-25 RX ADMIN — METRONIDAZOLE 500 MG: 500 INJECTION, SOLUTION INTRAVENOUS at 01:36

## 2017-09-25 RX ADMIN — PROPOFOL 25 MCG/KG/MIN: 10 INJECTION, EMULSION INTRAVENOUS at 02:39

## 2017-09-25 RX ADMIN — MIDAZOLAM HYDROCHLORIDE 5 MG: 1 INJECTION INTRAMUSCULAR; INTRAVENOUS at 03:08

## 2017-09-25 RX ADMIN — DEXTROSE AND SODIUM CHLORIDE 150 ML/HR: 5; .45 INJECTION, SOLUTION INTRAVENOUS at 07:52

## 2017-09-25 RX ADMIN — DEXTROSE AND SODIUM CHLORIDE 150 ML/HR: 5; .45 INJECTION, SOLUTION INTRAVENOUS at 00:43

## 2017-09-25 RX ADMIN — MIDAZOLAM HYDROCHLORIDE 5 MG: 1 INJECTION, SOLUTION INTRAMUSCULAR; INTRAVENOUS at 03:08

## 2017-09-25 RX ADMIN — HYDROCORTISONE SODIUM SUCCINATE 100 MG: 100 INJECTION, POWDER, FOR SOLUTION INTRAMUSCULAR; INTRAVENOUS at 06:32

## 2017-09-25 RX ADMIN — LEVALBUTEROL HYDROCHLORIDE 1.25 MG: 1.25 SOLUTION, CONCENTRATE RESPIRATORY (INHALATION) at 00:14

## 2017-09-25 RX ADMIN — SODIUM BICARBONATE 100 ML/HR: 84 INJECTION, SOLUTION INTRAVENOUS at 09:49

## 2017-09-25 RX ADMIN — PROPOFOL 100 MG: 10 INJECTION, EMULSION INTRAVENOUS at 02:12

## 2017-09-25 RX ADMIN — LEVALBUTEROL 1.25 MG: 1.25 SOLUTION, CONCENTRATE RESPIRATORY (INHALATION) at 07:51

## 2017-09-25 RX ADMIN — SODIUM BICARBONATE 150 ML/HR: 84 INJECTION, SOLUTION INTRAVENOUS at 10:01

## 2017-09-25 RX ADMIN — IPRATROPIUM BROMIDE 0.5 MG: 0.5 SOLUTION RESPIRATORY (INHALATION) at 04:29

## 2017-09-25 RX ADMIN — LEVALBUTEROL 1.25 MG: 1.25 SOLUTION, CONCENTRATE RESPIRATORY (INHALATION) at 04:30

## 2017-09-25 RX ADMIN — SUCCINYLCHOLINE CHLORIDE 60 MG: 20 INJECTION, SOLUTION INTRAMUSCULAR; INTRAVENOUS at 02:12

## 2017-09-25 RX ADMIN — NOREPINEPHRINE BITARTRATE 5 MCG/MIN: 1 INJECTION, SOLUTION, CONCENTRATE INTRAVENOUS at 04:24

## 2017-09-25 RX ADMIN — SODIUM BICARBONATE 100 ML/HR: 84 INJECTION, SOLUTION INTRAVENOUS at 09:52

## 2017-09-25 RX ADMIN — TACROLIMUS 0.5 MG: 0.5 CAPSULE ORAL at 09:15

## 2017-09-25 RX ADMIN — IPRATROPIUM BROMIDE 0.5 MG: 0.5 SOLUTION RESPIRATORY (INHALATION) at 07:51

## 2017-09-25 RX ADMIN — SODIUM BICARBONATE 50 MEQ: 84 INJECTION, SOLUTION INTRAVENOUS at 09:56

## 2017-09-25 RX ADMIN — IPRATROPIUM BROMIDE 0.5 MG: 0.5 SOLUTION RESPIRATORY (INHALATION) at 00:14

## 2017-09-25 NOTE — PROGRESS NOTES
Dr Devin Todd notified of pt's increased work of breathing, pt also more lethargic than earlier in shift  Vital signs stable: HR 71, RR 27 /68 spO2 93% RA  Dr Devin Todd at the bedside to assess pt

## 2017-09-25 NOTE — PLAN OF CARE
Problem: DISCHARGE PLANNING - CARE MANAGEMENT  Goal: Discharge to post-acute care or home with appropriate resources  INTERVENTIONS:  - Conduct assessment to determine patient/family and health care team treatment goals, and need for post-acute services based on payer coverage, community resources, and patient preferences, and barriers to discharge  - Address psychosocial, clinical, and financial barriers to discharge as identified in assessment in conjunction with the patient/family and health care team  - Arrange appropriate level of post-acute services according to patient's   needs and preference and payer coverage in collaboration with the physician and health care team  - Communicate with and update the patient/family, physician, and health care team regarding progress on the discharge plan  - Arrange appropriate transportation to post-acute venues   Outcome: Completed Date Met: 09/25/17  [facility]

## 2017-09-25 NOTE — PROGRESS NOTES
R subclavian central line placed by Dr Mecca Skelton @ 03:26    5mg of versed IV push given @03:08 for procedure  Pt's BP 88/55 @03:20, 250 ml NSS bolus given to pt, propofol placed on hold  BP 75/44 @ 03:40 750 ml NSS bolus given  BP 81/52 @ 04:10 new order received from Dr Mecca Skelton to initiate levophed drip

## 2017-09-25 NOTE — SOCIAL WORK
0560 I was informed that the patient was going to be transferred to [place] [id number] for a higher level of care  As they have waited 48 hours for the [facility] to call and they still did not have a bed  I called [facility] and I faxed an urgent request to them and I was awaiting a call then the family called me and told me that the helicopter was on the way for the patient to transport and will be here in a few minutes as the condition of the patient was declining  I called [facility] again and I was on hold and then I spoke to Juju and she spoke to Suzanne Keller the transplant nurse who told me that if the patient goes to [place] [address] then the services will not be covered by [facility]  I told her that the helicopter was here and [place] [address] accepted the patient and that the patient's condition was decling  She asked why the patient was not going to [place] and I told her that they do not do transplants and that the patient needs a transplant center  She told me that the patient will need to come to [facility] and the stay will be covered 100 % and we need to call their transfer center and I called and Dr Carina Turner spoke to the icu physician and they accepted  [facility] agreed to fly the patient to [place]  The family is aware of this  The patient was transported via [facility] to [facility]

## 2017-09-25 NOTE — PLAN OF CARE
Problem: Potential for Falls  Goal: Patient will remain free of falls  INTERVENTIONS:  - Assess patient frequently for physical needs  -  Identify cognitive and physical deficits and behaviors that affect risk of falls  -  [place] fall precautions as indicated by assessment   - Educate patient/family on patient safety including physical limitations  - Instruct patient to call for assistance with activity based on assessment  - Modify environment to reduce risk of injury  - Consider OT/PT consult to assist with strengthening/mobility   Outcome: Adequate for Discharge      Problem: Nutrition/Hydration-ADULT  Goal: Nutrient/Hydration intake appropriate for improving, restoring or maintaining nutritional needs  Monitor and assess patient's nutrition/hydration status for malnutrition (ex- brittle hair, bruises, dry skin, pale skin and conjunctiva, muscle wasting, smooth red tongue, and disorientation)  Collaborate with interdisciplinary team and initiate plan and interventions as ordered  Monitor patient's weight and dietary intake as ordered or per policy  Utilize nutrition screening tool and intervene per policy  Determine patient's food preferences and provide high-protein, high-caloric foods as appropriate       INTERVENTIONS:  - Monitor oral intake, urinary output, labs, and treatment plans  - Assess nutrition and hydration status and recommend course of action  - Evaluate amount of meals eaten  - Assist patient with eating if necessary   - Allow adequate time for meals  - Recommend/ encourage appropriate diets, oral nutritional supplements, and vitamin/mineral supplements  - Order, calculate, and assess calorie counts as needed  - Recommend, monitor, and adjust tube feedings and TPN/PPN based on assessed needs  - Assess need for intravenous fluids  - Provide specific nutrition/hydration education as appropriate  - Include patient/family/caregiver in decisions related to nutrition   Outcome: Adequate for Discharge      Problem: PAIN - ADULT  Goal: Verbalizes/displays adequate comfort level or baseline comfort level  Interventions:  - Encourage patient to monitor pain and request assistance  - Assess pain using appropriate pain scale  - Administer analgesics based on type and severity of pain and evaluate response  - Implement non-pharmacological measures as appropriate and evaluate response  - Consider cultural and social influences on pain and pain management  - Notify physician/advanced practitioner if interventions unsuccessful or patient reports new pain   Outcome: Adequate for Discharge      Problem: INFECTION - ADULT  Goal: Absence or prevention of progression during hospitalization  INTERVENTIONS:  - Assess and monitor for signs and symptoms of infection  - Monitor lab/diagnostic results  - Monitor all insertion sites, i e  indwelling lines, tubes, and drains  - Monitor endotracheal (as able) and nasal secretions for changes in amount and color  - [place] appropriate cooling/warming therapies per order  - Administer medications as ordered  - Instruct and encourage patient and family to use good hand hygiene technique  - Identify and instruct in appropriate isolation precautions for identified infection/condition   Outcome: Adequate for Discharge      Problem: SAFETY ADULT  Goal: Maintain or return to baseline ADL function  INTERVENTIONS:  -  Assess patient's ability to carry out ADLs; assess patient's baseline for ADL function and identify physical deficits which impact ability to perform ADLs (bathing, care of mouth/teeth, toileting, grooming, dressing, etc )  - Assess/evaluate cause of self-care deficits   - Assess range of motion  - Assess patient's mobility; develop plan if impaired  - Assess patient's need for assistive devices and provide as appropriate  - Encourage maximum independence but intervene and supervise when necessary  ¯ Involve family in performance of ADLs  ¯ Assess for home care needs following discharge   ¯ Request OT consult to assist with ADL evaluation and planning for discharge  ¯ Provide patient education as appropriate   Outcome: Adequate for Discharge    Goal: Maintain or return mobility status to optimal level  INTERVENTIONS:  - Assess patient's baseline mobility status (ambulation, transfers, stairs, etc )    - Identify cognitive and physical deficits and behaviors that affect mobility  - Identify mobility aids required to assist with transfers and/or ambulation (gait belt, sit-to-stand, lift, walker, cane, etc )  - [place] fall precautions as indicated by assessment  - Record patient progress and toleration of activity level on Mobility SBAR; progress patient to next Phase/Stage  - Instruct patient to call for assistance with activity based on assessment  - Request Rehabilitation consult to assist with strengthening/weightbearing, etc    Outcome: Adequate for Discharge      Problem: DISCHARGE PLANNING  Goal: Discharge to home or other facility with appropriate resources  INTERVENTIONS:  - Identify barriers to discharge w/patient and caregiver  - Arrange for needed discharge resources and transportation as appropriate  - Identify discharge learning needs (meds, wound care, etc )  - Arrange for interpretive services to assist at discharge as needed  - Refer to Case Management Department for coordinating discharge planning if the patient needs post-hospital services based on physician/advanced practitioner order or complex needs related to functional status, cognitive ability, or social support system   Outcome: Completed Date Met: 09/25/17      Problem: Knowledge Deficit  Goal: Patient/family/caregiver demonstrates understanding of disease process, treatment plan, medications, and discharge instructions  Complete learning assessment and assess knowledge base    Interventions:  - Provide teaching at level of understanding  - Provide teaching via preferred learning methods   Outcome: Completed Date Met: 09/25/17      Problem: NEUROSENSORY - ADULT  Goal: Achieves stable or improved neurological status  INTERVENTIONS  - Monitor and report changes in neurological status  - Initiate measures to prevent increased intracranial pressure  - Maintain blood pressure and fluid volume within ordered parameters to optimize cerebral perfusion  - Monitor temperature, glucose, and sodium or any other associated labs  Initiate appropriate interventions as ordered  - Monitor for seizure activity   - Administer anti-seizure medications as ordered   Outcome: Adequate for Discharge      Problem: CARDIOVASCULAR - ADULT  Goal: Maintains optimal cardiac output and hemodynamic stability  INTERVENTIONS:  - Monitor I/O, vital signs and rhythm  - Monitor for S/S and trends of decreased cardiac output i e  bleeding, hypotension  - Administer and titrate ordered vasoactive medications to optimize hemodynamic stability  - Assess quality of pulses, skin color and temperature  - Assess for signs of decreased coronary artery perfusion - ex   Angina  - Instruct patient to report change in severity of symptoms   Outcome: Adequate for Discharge      Problem: RESPIRATORY - ADULT  Goal: Achieves optimal ventilation and oxygenation  INTERVENTIONS:  - Assess for changes in respiratory status  - Assess for changes in mentation and behavior  - Position to facilitate oxygenation and minimize respiratory effort  - Oxygen administration by appropriate delivery method based on oxygen saturation (per order) or ABGs  - Initiate smoking cessation education as indicated  - Encourage broncho-pulmonary hygiene including cough, deep breathe, Incentive Spirometry  - Assess the need for suctioning and aspirate as needed  - Assess and instruct to report SOB or any respiratory difficulty  - Respiratory Therapy support as indicated   Outcome: Adequate for Discharge      Problem: GASTROINTESTINAL - ADULT  Goal: Minimal or absence of nausea and/or vomiting  INTERVENTIONS:  - Administer IV fluids as ordered to ensure adequate hydration  - Maintain NPO status until nausea and vomiting are resolved  - Nasogastric tube as ordered  - Administer ordered antiemetic medications as needed  - Provide nonpharmacologic comfort measures as appropriate  - Advance diet as tolerated, if ordered  - Nutrition services referral to assist patient with adequate nutrition and appropriate food choices   Outcome: Adequate for Discharge    Goal: Maintains or returns to baseline bowel function  INTERVENTIONS:  - Assess bowel function  - Encourage oral fluids to ensure adequate hydration  - Administer IV fluids as ordered to ensure adequate hydration  - Administer ordered medications as needed  - Encourage mobilization and activity  - Nutrition services referral to assist patient with appropriate food choices   Outcome: Adequate for Discharge    Goal: Maintains adequate nutritional intake  INTERVENTIONS:  - Monitor percentage of each meal consumed  - Identify factors contributing to decreased intake, treat as appropriate  - Assist with meals as needed  - Monitor I&O, WT and lab values  - Obtain nutrition services referral as needed   Outcome: Adequate for Discharge      Problem: GENITOURINARY - ADULT  Goal: Maintains or returns to baseline urinary function  INTERVENTIONS:  - Assess urinary function  - Encourage oral fluids to ensure adequate hydration  - Administer IV fluids as ordered to ensure adequate hydration  - Administer ordered medications as needed  - Offer frequent toileting  - Follow urinary retention protocol if ordered   Outcome: Adequate for Discharge    Goal: Absence of urinary retention  INTERVENTIONS:  - Assess patients ability to void and empty bladder  - Monitor I/O  - Bladder scan as needed  - Discuss with physician/AP medications to alleviate retention as needed  - Discuss catheterization for long term situations as appropriate   Outcome: Adequate for Discharge      Problem: METABOLIC, FLUID AND ELECTROLYTES - ADULT  Goal: Electrolytes maintained within normal limits  INTERVENTIONS:  - Monitor labs and assess patient for signs and symptoms of electrolyte imbalances  - Administer electrolyte replacement as ordered  - Monitor response to electrolyte replacements, including repeat lab results as appropriate  - Instruct patient on fluid and nutrition as appropriate   Outcome: Adequate for Discharge    Goal: Fluid balance maintained  INTERVENTIONS:  - Monitor labs and assess for signs and symptoms of volume excess or deficit  - Monitor I/O and WT  - Instruct patient on fluid and nutrition as appropriate   Outcome: Adequate for Discharge    Goal: Glucose maintained within target range  INTERVENTIONS:  - Monitor Blood Glucose as ordered  - Assess for signs and symptoms of hyperglycemia and hypoglycemia  - Administer ordered medications to maintain glucose within target range  - Assess nutritional intake and initiate nutrition service referral as needed   Outcome: Adequate for Discharge      Problem: SKIN/TISSUE INTEGRITY - ADULT  Goal: Skin integrity remains intact  INTERVENTIONS  - Identify patients at risk for skin breakdown  - Assess and monitor skin integrity  - Assess and monitor nutrition and hydration status  - Monitor labs (i e  albumin)  - Assess for incontinence   - Turn and reposition patient  - Assist with mobility/ambulation  - Relieve pressure over bony prominences  - Avoid friction and shearing  - Provide appropriate hygiene as needed including keeping skin clean and dry  - Evaluate need for skin moisturizer/barrier cream  - Collaborate with interdisciplinary team (i e  Nutrition, Rehabilitation, etc )   - Patient/family teaching   Outcome: Adequate for Discharge    Goal: Incision(s), wounds(s) or drain site(s) healing without S/S of infection  INTERVENTIONS  - Assess and document risk factors for skin impairment   - Assess and document dressing, incision, wound bed, drain sites and surrounding tissue  - Initiate Nutrition services consult and/or wound management as needed   Outcome: Adequate for Discharge      Problem: HEMATOLOGIC - ADULT  Goal: Maintains hematologic stability  INTERVENTIONS  - Assess for signs and symptoms of bleeding or hemorrhage  - Monitor labs  - Administer supportive blood products/factors as ordered and appropriate   Outcome: Adequate for Discharge      Problem: MUSCULOSKELETAL - ADULT  Goal: Maintain or return mobility to safest level of function  INTERVENTIONS:  - Assess patient's ability to carry out ADLs; assess patient's baseline for ADL function and identify physical deficits which impact ability to perform ADLs (bathing, care of mouth/teeth, toileting, grooming, dressing, etc )  - Assess/evaluate cause of self-care deficits   - Assess range of motion  - Assess patient's mobility; develop plan if impaired  - Assess patient's need for assistive devices and provide as appropriate  - Encourage maximum independence but intervene and supervise when necessary  - Involve family in performance of ADLs  - Assess for home care needs following discharge   - Request OT consult to assist with ADL evaluation and planning for discharge  - Provide patient education as appropriate   Outcome: Adequate for Discharge      Problem: DISCHARGE PLANNING - CARE MANAGEMENT  Goal: Discharge to post-acute care or home with appropriate resources  INTERVENTIONS:  - Conduct assessment to determine patient/family and health care team treatment goals, and need for post-acute services based on payer coverage, community resources, and patient preferences, and barriers to discharge  - Address psychosocial, clinical, and financial barriers to discharge as identified in assessment in conjunction with the patient/family and health care team  - Arrange appropriate level of post-acute services according to patient's   needs and preference and payer coverage in collaboration with the physician and health care team  - Communicate with and update the patient/family, physician, and health care team regarding progress on the discharge plan  - Arrange appropriate transportation to post-acute venues   Outcome: Completed Date Met: 09/25/17      Problem: Prexisting or High Potential for Compromised Skin Integrity  Goal: Skin integrity is maintained or improved  INTERVENTIONS:  - Identify patients at risk for skin breakdown  - Assess and monitor skin integrity  - Assess and monitor nutrition and hydration status  - Monitor labs (i e  albumin)  - Assess for incontinence   - Turn and reposition patient  - Assist with mobility/ambulation  - Relieve pressure over bony prominences  - Avoid friction and shearing  - Provide appropriate hygiene as needed including keeping skin clean and dry  - Evaluate need for skin moisturizer/barrier cream  - Collaborate with interdisciplinary team (i e  Nutrition, Rehabilitation, etc )   - Patient/family teaching   Outcome: Adequate for Discharge      Problem: SAFETY,RESTRAINT: NV/NON-SELF DESTRUCTIVE BEHAVIOR  Goal: Remains free of harm/injury (restraint for non violent/non self-detsructive behavior)  INTERVENTIONS:  - Instruct patient/family regarding restraint use   - Assess and monitor physiologic and psychological status   - Provide interventions and comfort measures to meet assessed patient needs   - Identify and implement measures to help patient regain control  - Assess readiness for release of restraint   Outcome: Completed Date Met: 09/25/17    Goal: Returns to optimal restraint-free functioning  INTERVENTIONS:  - Assess the patient's behavior and symptoms that indicate continued need for restraint  - Identify and implement measures to help patient regain control  - Assess readiness for release of restraint   Outcome: Completed Date Met: 09/25/17

## 2017-09-25 NOTE — PROGRESS NOTES
Progress Note - Nephrology   Avni Hurst 51 y o  male MRN: [id number]  Unit/Bed#:  Encounter: [id number]    A/P:  1  MIKE on top of CKD due to volume depletion, sepsis, with possible Renal Transplant rejection                        Follow up urine Na/Cr, continue with aggressive volume expansion  Reassess labs in a few hours  If there is not a continued improvement in parameters we will need to consider transferring the patient to a higher level of care  With respect to his transplant, only one RI is slightly elevated, we will keep this in mind as we progress over the course of the day  9/24: Cr improved, likely a combination of improvement in renal function as well as dilutional effect  Patient has a history of urinary retention, PVR were noted to be elevated, patient finally allowed for a straight cath last night with over 400 ml out  He has urinated twice since last night for about 400 ml, but has gotten about 1 25 L in the meantime  Check PVR this AM and straight cath if PVR is greater than 300 ml (patient not agreeable with lesser amount)  9/25: Patient with minimal urine output (since mid-night) and is at high risk of going into oliguric MIKE, he will likely need to be started on CVVHD in the next 24hrs  Plans of transfer to [facility] unable to be completed and therefore we will need to transfer to another higher level of facility in the next 6 to 12 hrs  This is currently being worked on  In the meantime, I am agreeable with giving the patient 40 IV lasix as he has been responding this dose  2  CKD III with baseline Cr ~1 5 mg/dL  3  Immunosuppression s/p Renal Transplant                        As mentioned above, one RI is slightly elevated, it may be a possible error in reading due to Kussmaul breathing  Continue with tacrolimus, hold MMF for now, start hydrocortisone 100mg IV TID for potential adrenal insufficiency    5  Hypotension -multifactorial, volume depletion and adrenal insufficiency                        Continue with volume expansion, start IV hydrocortisone   9/24: BPs better, continue with maintenance fluid but transition to D5 1/2NS, pls refer below to reasoning  Continue with hydrocortisone, patient is adequately volume resuscitated at this time  9/25: Patient may need to be started on levophed in the next several hours  6  DKA                         Continue with insulin gtt, patient now on D5NS due to serum glucose of about 200 g/dL  9/24: Patient's BS has increased potentially due to underlying clinical process, however, he was placed on a bicarb gtt over night with D5W 150 mEq/L of bicarb at 100 ml/hr + D5NS at 150 ml/hr now with BS just below 400 g/dL  I will stop bicarb gtt as it is not likely of benefit, continue with D5 but change the tonicity to 1/2NS at 150 ml/hr, increase insulin gtt to properly address current hyperglycemia and better address ketosis  I would not transition to straight NS or 1/2NS at this time anticipating decrease in glucose and need to maintain high insulin levels  9/25: Continues to be an ongoing issue, main cause remains unaddressed and will need to be further evaluated in definitive manner as a higher level of care facility  7  Osteomyelitis probable                        We will need to defer MRI this morning due to critical state of affairs for this patient  Either way, at this time, no change in treatment on the MRI confirming for or against osteomyelitis  9/24: Patient's underlying cause for DKA is likely osteo of the right foot, his last surgery removed hardware  Will defer to surgical colleagues regarding best course of action  8  VDRF   ABG evaluated this AM, increase volume to 450 ml as he has some respiratory acidosis at this time, recheck ABG in 1 hr with further changes as indicated  9  SupraTx INR   After discussion with hospitalist, patient will receive vitK, DIC work up to be initiated     9/25: DIC w/u negative at this time  9  Pulmonary Edema -likely   Check portable CXR, will give a dose of IV lasix this AM   Patient was experiencing bronchospasm earlier this AM and was given a neb Tx with some improvement  10  Hypomagnesemia   4gr IV magnesium ordered this morning    9/25: Add Mg to AM labs  Follow up reason for today's visit: MIKE/CKD/immunosuppression management/electrolyte disorder    Patient Active Problem List   Diagnosis    Osteomyelitis    Foot pain    Hypertension    Type 1 diabetes mellitus    Renal transplant, status post    Hyperkalemia    MIKE (acute kidney injury)    Osteomyelitis    Poor circulation    Closed fracture of distal end of right tibia with routine healing    Elevated platelet count    Chronic kidney disease, stage IV (severe)    Chronic osteomyelitis of tibia    Immunosuppression    Hypertension    DKA (diabetic ketoacidoses)    Elevated troponin    Diarrhea    Elevated INR    Cellulitis of ankle    Non-ST elevation myocardial infarction (NSTEMI), type 2    Urinary retention    Abnormal x-ray         Subjective:    Patient was intubated overnight  Objective:     Vitals: Blood pressure (!) 86/48, pulse 73, temperature (!) 96 8 °F (36 °C), temperature source Temporal, resp  rate (!) 25, height 5' 4" (1 626 m), weight 48 5 kg (106 lb 14 8 oz), SpO2 99 %  ,Body mass index is 18 35 kg/m²      Weight (last 2 days)     Date/Time   Weight    09/23/17 0517  48 5 (106 92)                Intake/Output Summary (Last 24 hours) at 09/25/17 0831  Last data filed at 09/25/17 0751   Gross per 24 hour   Intake          4146 98 ml   Output             2645 ml   Net          1501 98 ml            Physical Exam: BP (!) 86/48   Pulse 73   Temp (!) 96 8 °F (36 °C) (Temporal)   Resp (!) 25   Ht 5' 4" (1 626 m)   Wt 48 5 kg (106 lb 14 8 oz)   SpO2 99%   BMI 18 35 kg/m²     General Appearance:    Intubated and sedated, edematous throughout   Head:    Normocephalic, without obvious abnormality, atraumatic   Eyes:    Conjunctiva/corneas clear   Ears:    Normal external ears   Nose:   Nares normal, septum midline, mucosa normal, no drainage    or sinus tenderness   Throat:   Lips, mucosa, and tongue normal; teeth and gums normal   Neck:   Supple   Back:     Symmetric, no curvature, ROM normal, no CVA tenderness   Lungs:     Rhonchi with soft crackles   Chest wall:    No tenderness or deformity   Heart:    Regular rate and rhythm, S1 and S2 normal, no murmur, rub   or gallop   Abdomen:     Soft, non-tender, bowel sounds active   Extremities:   Right LE with +2 edema, +1 LE with trace edema   Skin:   Skin color, texture, turgor normal, no rashes or lesions   Lymph nodes:   Cervical normal   Neurologic:   CNII-XII intact            Lab, Imaging and other studies: I have personally reviewed pertinent labs  CBC:   Lab Results   Component Value Date    WBC 6 12 09/24/2017    HGB 8 3 (L) 09/24/2017    HCT 25 7 (L) 09/24/2017    MCV 81 (L) 09/24/2017     09/24/2017    MCH 26 0 (L) 09/24/2017    MCHC 32 3 09/24/2017    RDW 17 5 (H) 09/24/2017    MPV 9 8 09/24/2017     CMP:   Lab Results   Component Value Date     09/25/2017    K 3 9 09/25/2017     (H) 09/25/2017    CO2 6 (LL) 09/25/2017    ANIONGAP 22 (H) 09/25/2017    BUN 80 (H) 09/25/2017    CREATININE 3 80 (H) 09/25/2017    GLUCOSE 312 (H) 09/25/2017    CALCIUM 6 9 (L) 09/25/2017    AST 26 09/24/2017    ALT 21 09/24/2017    ALKPHOS 235 (H) 09/24/2017    PROT 6 1 (L) 09/24/2017    ALBUMIN 2 8 (L) 09/24/2017    BILITOT 0 20 09/24/2017    EGFR 18 09/25/2017           Results from last 7 days  Lab Units 09/25/17  0612 09/25/17  0358 09/25/17  0208  09/24/17  1530  09/23/17  0516   SODIUM mmol/L 139 139 138  < > 139  < > 131*   POTASSIUM mmol/L 3 9 4 0 4 3  < > 3 3*  < > 4 5   CHLORIDE mmol/L 111* 111* 111*  < > 113*  < > 102   CO2 mmol/L 6* 6* 5*  < > 5*  < > <5*   BUN mg/dL 80* 79* 81*  < > 79*  < > 85*   CREATININE mg/dL 3 80* 3 70* 3 91*  < > 3 95*  < > 4 77*   CALCIUM mg/dL 6 9* 6 6* 7 2*  < > 7 3*  < > 7 3*   TOTAL PROTEIN g/dL  --   --   --   --  6 1*  --  7 0   BILIRUBIN TOTAL mg/dL  --   --   --   --  0 20  --  0 30   ALK PHOS U/L  --   --   --   --  235*  --  289*   ALT U/L  --   --   --   --  21  --  22   AST U/L  --   --   --   --  26  --  24   GLUCOSE RANDOM mg/dL 312* 283* 227*  < > 148*  < > 349*   < > = values in this interval not displayed  Phosphorus:   No results found for: PHOS  Magnesium:   No results found for: MG  Urinalysis: No results found for: Jonathon Butcher, SPECGRAV, PHUR, LEUKOCYTESUR, NITRITE, PROTEINUA, GLUCOSEU, KETONESU, BILIRUBINUR, BLOODU  Ionized Calcium: No results found for: CAION  Coagulation: No results found for: PT, INR, APTT  Troponin:   Lab Results   Component Value Date    TROPONINI 0 46 ([place]) 09/24/2017     ABG:   Lab Results   Component Value Date    PHART 6 957 (LL) 09/25/2017    HNJ2EDQ 21 1 (LL) 09/25/2017    PO2ART 103 4 09/25/2017    RFY4UTY 4 6 (L) 09/25/2017    BEART -25 4 09/25/2017    SOURCE Brachial, Right 09/25/2017     Radiology review:     Post-intubation X-ray pending read    CHEST      INDICATION:  Shortness of breath and wheezing     COMPARISON:  9/23/2017     VIEWS:   AP frontal     IMAGES:  1     FINDINGS:          Cardiomediastinal silhouette appears unremarkable      There is hazy infiltrate in the right lung base  No pleural fluid or pneumothorax appreciated      Visualized osseous structures appear within normal limits for the patient's age      IMPRESSION:     Hazy right lung base infiltrate  The possibility of developing pneumonia is not excluded    Recommend continued follow-up         ##imslh##imslh        Workstation performed: [id number]      Imaging     XR chest portable (Order #[id number]) on 9/24/2017 - Imaging Information       Current Facility-Administered Medications   Medication Dose Route Frequency    atenolol (TENORMIN) tablet 25 mg  25 mg Oral Daily  cefepime (MAXIPIME) 2,000 mg in dextrose 5 % 50 mL IVPB  2,000 mg Intravenous Q24H    dextrose 5 % and sodium chloride 0 45 % infusion  150 mL/hr Intravenous Continuous    furosemide (LASIX) injection 40 mg  40 mg Intravenous Once    hydrocortisone sodium succinate (PF) (Solu-CORTEF) injection 100 mg  100 mg Intravenous Q8H [address]    insulin regular (HumuLIN R,NovoLIN R) 1 Units/mL in sodium chloride 0 9 % 100 mL infusion  4 Units/hr Intravenous Continuous    ipratropium (ATROVENT) 0 02 % inhalation solution 0 5 mg  0 5 mg Nebulization TID    levalbuterol (XOPENEX) inhalation solution 1 25 mg  1 25 mg Nebulization TID    metroNIDAZOLE (FLAGYL) IVPB (premix) 500 mg  500 mg Intravenous Q8H    norepinephrine (LEVOPHED) 4 mg (STANDARD CONCENTRATION) IV in sodium chloride 0 9% 250 mL  1-30 mcg/min Intravenous Titrated    propofol (DIPRIVAN) 1000 mg in 100 mL infusion (premix)  5-50 mcg/kg/min Intravenous Titrated    sodium bicarbonate 150 mEq in dextrose 5 % 1,000 mL infusion  100 mL/hr Intravenous Continuous    tacrolimus (PROGRAF) capsule 0 5 mg  0 5 mg Oral BID    vancomycin (VANCOCIN) 1,000 mg in sodium chloride 0 9 % 250 mL IVPB  20 mg/kg Intravenous Q48H     Medications Discontinued During This Encounter   Medication Reason    insulin lispro (HumaLOG) 100 units/mL injection Therapy completed    tamsulosin (FLOMAX) 0 4 mg Therapy completed    sodium chloride 0 9 % bolus 30 mL/kg     sodium chloride (PF) 0 9 % injection 3 mL Patient Transfer    insulin regular (HumuLIN R,NovoLIN R) 1 Units/mL in sodium chloride 0 9 % 316 mL infusion Duplicate order    metroNIDAZOLE (FLAGYL) IVPB (premix) 425 mg Duplicate order    dextrose 5 % and sodium chloride 0 9 % infusion     vancomycin (VANCOCIN) 750 mg in sodium chloride 0 9 % 250 mL IVPB Patient Transfer    vancomycin (VANCOCIN) 750 mg in sodium chloride 0 9 % 250 mL IVPB Dose adjustment    mycophenolate (CELLCEPT) capsule 750 mg     predniSONE tablet 5 mg     influenza inactivated quadrivalent vaccine (FLUARIX) IM injection 0 5 mL     dextrose 5 % and sodium chloride 0 9 % infusion     dextrose 5 % and sodium chloride 0 9 % infusion     magnesium sulfate 4 g/100 mL IVPB (premix) 4 g Alternate therapy    sodium bicarbonate 150 mEq in dextrose 5 % 1,000 mL infusion     sodium chloride 0 9 % infusion     dextrose 5 % and sodium chloride 0 9 % infusion     potassium chloride 20 mEq IVPB (premix) Reorder    insulin detemir (LEVEMIR) subcutaneous injection 10 Units     ipratropium (ATROVENT) 0 02 % inhalation solution 0 5 mg     levalbuterol (XOPENEX) inhalation solution 1 25 mg        Total additional time as critical care time is 40 min         Leni Lima DO

## 2017-09-25 NOTE — DISCHARGE SUMMARY
Discharge Summary - [facility]    Patient Information: Enid Zavaleta 51 y o  male MRN: [id number]  Unit/Bed#:  Encounter: [id number]    Discharging Physician / Practitioner: Walt Mascorro MD  PCP: Rob Paul DO  Admission Date: 9/23/2017  Discharge Date: 09/25/17     THIS IS AN ADDENDUM TO THE ORIGINAL DISCHARGE SUMMARY WHICH WAS DOCUMENTED ON 9/23/17 WHEN INITIAL TRANSFER PLANNING WAS ARRANGED  Reason for Admission: DKA, metabolic acidosis, MIKE    Discharge Diagnoses:     Principal Problem:    DKA (diabetic ketoacidoses)  Active Problems:    Foot pain    MIKE (acute kidney injury)    Chronic kidney disease, stage IV (severe)    Chronic osteomyelitis of tibia    Immunosuppression    Elevated troponin    Diarrhea    Elevated INR    Non-ST elevation myocardial infarction (NSTEMI), type 2    Urinary retention    Abnormal x-ray  Resolved Problems:    Cellulitis    Pain from implanted hardware      Consultations During Hospital Stay:  · Nephrology   · Critical Care     Procedures Performed:     · Intubation, Subclavian central venous line placement    Significant Findings / Test Results:     · Multiple    Incidental Findings:   · None    Test Results Pending at Discharge (will require follow up):   · Microbiological cultures     Outpatient Tests Requested:  · Will be determined    Complications:  None    Hospital Course:     Enid Zavaleta is a 51 y o  male patient with history of diabetes mellitus type 1, status post renal transplant at [address] [place] [address], as well as history of right ankle fracture and osteomyelitis, who originally presented to the hospital on 9/23/2017 due to severe DKA with pH of 6 9, MIKE and an elevated INR  The patient's anion gap was initially undetectable   The etiology of the DKA was deemed to be secondary to an underlying infection, right ankle osteomyelitis given history of osteomyelitis with hardware placement s/p removal  Other infectious processes suspected were underlying colitis, possibly Campylobacter colitis as patient had a contact with the infection and initially presented with diarrhea, as well as possibly developing pneumonia as was evidenced on chest xray  Given patient's critical illness as well as his immunocompromised status due to being status post renal transplant, arrangements were immediately made for his transfer to [address] [place] [address] ICU for further management  However, no bed was available and the patient remained at the [facility] for further stabilization while awaiting a bed at [facility]  Although initially slightly improving his acid-base balance with aggressive treatment with fluids and insulin as appropriate for DKA, the patient's state then proceeded to deteriorate  Due to his increased work of breathing secondary to underlying DKA, the patient became fatigued requiring intubation  Due to episodes of hypotension that the patient developed, and his unresponsiveness to IVF, he was started on Levophed to maintain his BP as his central line was established in the Right Subclavian Vein  Although initially with fair urine output with help of urinary straight cath, the patient developed poor urine output and produced minimal amount of urine during the last 12 hours of his stay  After more than 48 hours of attempts to improve the patient's status and as no bed was still available at the [facility], an urgent arrangement was made to transfer the patient to [facility] ICU where a kidney transplant center was available, to further treat his academia with urgent dialysis which was unavailable at the [facility]     Condition at Discharge: serious     Discharge Day Visit / Exam:     Subjective:  Patient intubated and on Levophed  Poor urine output  Sedated on Propofol and unable to follow command    Vitals: Blood Pressure: (!) 86/48 (09/25/17 0829)  Pulse: 73 (09/25/17 8807)  Temperature: 97 5 °F (36 4 °C) (09/25/17 0700)  Temp Source: Temporal (09/25/17 0700)  Respirations: (!) 25 (09/25/17 0630)  Height: 5' 4" (162 6 cm) (09/23/17 0450)  Weight - Scale: 48 5 kg (106 lb 14 8 oz) (09/23/17 0517)  SpO2: 100 % (09/25/17 0921)  Exam:     Physical Exam   Constitutional: He appears toxic  He is sedated and intubated  HENT:   Head: Normocephalic  Neck: No JVD present  Cardiovascular: Normal rate and regular rhythm  Exam reveals no gallop and no friction rub  No murmur heard  Pulmonary/Chest: He is intubated  He has rales in the right lower field  Abdominal: Soft  He exhibits no distension  Musculoskeletal: He exhibits edema  Neurological:   Sedated, unable to follow command   Skin:   Right ankle mild erythema and edema       Discussion with Family: Girlfrienmichael Mercer    Discharge instructions/Information to patient and family:   See after visit summary for information provided to patient and family  Provisions for Follow-Up Care:  See after visit summary for information related to follow-up care and any pertinent home health orders  Disposition:     Transfer to [address] [place] [address]  60[address]: Angus MENDEZ, [facility],Third [facility] under the care of Dr Betty Reid to [facility] SNF:   · Not Applicable to this Patient - Not Applicable to this Patient    Planned Readmission: Unknown     Discharge Statement:  I spent 60 minutes discharging the patient  This time was spent on the day of discharge  I had direct contact with the patient on the day of discharge  Greater than 50% of the total time was spent examining patient, answering all patient questions, arranging and discussing plan of care with patient as well as directly providing post-discharge instructions  Additional time then spent on discharge activities  Discharge Medications:  See after visit summary for reconciled discharge medications provided to patient and family

## 2017-09-25 NOTE — PROGRESS NOTES
Patient transferred to [facility]  ICU Rm 5 via stretcher accompanied by [employer]  Patient transported intubated with propofol, levophed, insulin, and ivf drips  Report called and given to Omi Carter RN at receiving facility  Belongings sent home with family   Transfer information packet with radiology disc sent with transport team

## 2017-09-26 LAB
ADENOVIRUS: NOT DETECTED
ATRIAL RATE: 82 BPM
C PNEUM DNA SPEC QL NAA+PROBE: NOT DETECTED
FLUAV H1 RNA SPEC QL NAA+PROBE: NOT DETECTED
FLUAV H3 RNA SPEC QL NAA+PROBE: NOT DETECTED
FLUAV RNA SPEC QL NAA+PROBE: NOT DETECTED
FLUBV RNA SPEC QL NAA+PROBE: NOT DETECTED
HAPTOGLOB SERPL-MCNC: 202 MG/DL (ref 34–200)
HBOV DNA SPEC QL NAA+PROBE: NOT DETECTED
HCOV 229E RNA SPEC QL NAA+PROBE: NOT DETECTED
HCOV HKU1 RNA SPEC QL NAA+PROBE: NOT DETECTED
HCOV NL63 RNA SPEC QL NAA+PROBE: NOT DETECTED
HCOV OC43 RNA SPEC QL NAA+PROBE: NOT DETECTED
HPIV1 RNA SPEC QL NAA+PROBE: NOT DETECTED
HPIV2 RNA SPEC QL NAA+PROBE: NOT DETECTED
HPIV3 RNA SPEC QL NAA+PROBE: NOT DETECTED
HPIV4 RNA SPEC QL NAA+PROBE: NOT DETECTED
M PNEUMO DNA SPEC QL NAA+PROBE: NOT DETECTED
METAPNEUMOVIRUS: NOT DETECTED
P AXIS: 69 DEGREES
PR INTERVAL: 132 MS
QRS AXIS: 80 DEGREES
QRSD INTERVAL: 96 MS
QT INTERVAL: 434 MS
QTC INTERVAL: 507 MS
RHINOVIRUS RNA SPEC QL NAA+PROBE: DETECTED
RSV A RNA SPEC QL NAA+PROBE: NOT DETECTED
RSV B RNA SPEC QL NAA+PROBE: NOT DETECTED
T WAVE AXIS: 59 DEGREES
VENTRICULAR RATE: 82 BPM

## 2017-09-28 LAB
BACTERIA BLD CULT: NORMAL
BACTERIA BLD CULT: NORMAL

## 2017-10-03 ENCOUNTER — ALLSCRIPTS OFFICE VISIT (OUTPATIENT)
Dept: OTHER | Facility: OTHER | Age: 47
End: 2017-10-03

## 2017-10-07 ENCOUNTER — APPOINTMENT (OUTPATIENT)
Dept: LAB | Facility: HOSPITAL | Age: 47
End: 2017-10-07
Attending: FAMILY MEDICINE
Payer: COMMERCIAL

## 2017-10-07 ENCOUNTER — TRANSCRIBE ORDERS (OUTPATIENT)
Dept: ADMINISTRATIVE | Facility: HOSPITAL | Age: 47
End: 2017-10-07

## 2017-10-07 DIAGNOSIS — E87.6 HYPOKALEMIA: ICD-10-CM

## 2017-10-07 DIAGNOSIS — R79.0 ABNORMAL LEVEL OF BLOOD MINERAL: ICD-10-CM

## 2017-10-07 LAB
ANION GAP SERPL CALCULATED.3IONS-SCNC: 18 MMOL/L (ref 4–13)
BUN SERPL-MCNC: 54 MG/DL (ref 5–25)
CALCIUM SERPL-MCNC: 6.6 MG/DL (ref 8.3–10.1)
CHLORIDE SERPL-SCNC: 102 MMOL/L (ref 100–108)
CO2 SERPL-SCNC: 15 MMOL/L (ref 21–32)
CREAT SERPL-MCNC: 2.29 MG/DL (ref 0.6–1.3)
GFR SERPL CREATININE-BSD FRML MDRD: 33 ML/MIN/1.73SQ M
GLUCOSE P FAST SERPL-MCNC: 110 MG/DL (ref 65–99)
MAGNESIUM SERPL-MCNC: 1.4 MG/DL (ref 1.6–2.6)
POTASSIUM SERPL-SCNC: 3.6 MMOL/L (ref 3.5–5.3)
SODIUM SERPL-SCNC: 135 MMOL/L (ref 136–145)

## 2017-10-07 PROCEDURE — 36415 COLL VENOUS BLD VENIPUNCTURE: CPT

## 2017-10-07 PROCEDURE — 83735 ASSAY OF MAGNESIUM: CPT

## 2017-10-07 PROCEDURE — 80048 BASIC METABOLIC PNL TOTAL CA: CPT

## 2017-10-09 ENCOUNTER — GENERIC CONVERSION - ENCOUNTER (OUTPATIENT)
Dept: OTHER | Facility: OTHER | Age: 47
End: 2017-10-09

## 2017-10-17 DIAGNOSIS — K52.9 NONINFECTIVE GASTROENTERITIS AND COLITIS: ICD-10-CM

## 2017-10-24 ENCOUNTER — APPOINTMENT (OUTPATIENT)
Dept: LAB | Facility: HOSPITAL | Age: 47
End: 2017-10-24
Payer: COMMERCIAL

## 2017-10-24 ENCOUNTER — TRANSCRIBE ORDERS (OUTPATIENT)
Dept: ADMINISTRATIVE | Facility: HOSPITAL | Age: 47
End: 2017-10-24

## 2017-10-24 DIAGNOSIS — Z94.0 KIDNEY TRANSPLANT RECIPIENT: ICD-10-CM

## 2017-10-24 DIAGNOSIS — Z94.0 KIDNEY TRANSPLANT RECIPIENT: Primary | ICD-10-CM

## 2017-10-24 LAB
ALBUMIN SERPL BCP-MCNC: 2.4 G/DL (ref 3.5–5)
ALP SERPL-CCNC: 235 U/L (ref 46–116)
ALT SERPL W P-5'-P-CCNC: 15 U/L (ref 12–78)
ANION GAP SERPL CALCULATED.3IONS-SCNC: 20 MMOL/L (ref 4–13)
AST SERPL W P-5'-P-CCNC: 14 U/L (ref 5–45)
BASOPHILS # BLD AUTO: 0.01 THOUSANDS/ΜL (ref 0–0.1)
BASOPHILS NFR BLD AUTO: 0 % (ref 0–1)
BILIRUB SERPL-MCNC: 0.3 MG/DL (ref 0.2–1)
BUN SERPL-MCNC: 88 MG/DL (ref 5–25)
CALCIUM SERPL-MCNC: 5.8 MG/DL (ref 8.3–10.1)
CHLORIDE SERPL-SCNC: 106 MMOL/L (ref 100–108)
CO2 SERPL-SCNC: 10 MMOL/L (ref 21–32)
CREAT SERPL-MCNC: 3.24 MG/DL (ref 0.6–1.3)
EOSINOPHIL # BLD AUTO: 0.05 THOUSAND/ΜL (ref 0–0.61)
EOSINOPHIL NFR BLD AUTO: 1 % (ref 0–6)
ERYTHROCYTE [DISTWIDTH] IN BLOOD BY AUTOMATED COUNT: 18.3 % (ref 11.6–15.1)
GFR SERPL CREATININE-BSD FRML MDRD: 22 ML/MIN/1.73SQ M
GLUCOSE P FAST SERPL-MCNC: 130 MG/DL (ref 65–99)
HCT VFR BLD AUTO: 26.3 % (ref 36.5–49.3)
HGB BLD-MCNC: 8.4 G/DL (ref 12–17)
LYMPHOCYTES # BLD AUTO: 1.13 THOUSANDS/ΜL (ref 0.6–4.47)
LYMPHOCYTES NFR BLD AUTO: 14 % (ref 14–44)
MCH RBC QN AUTO: 26.3 PG (ref 26.8–34.3)
MCHC RBC AUTO-ENTMCNC: 31.9 G/DL (ref 31.4–37.4)
MCV RBC AUTO: 82 FL (ref 82–98)
MONOCYTES # BLD AUTO: 0.76 THOUSAND/ΜL (ref 0.17–1.22)
MONOCYTES NFR BLD AUTO: 10 % (ref 4–12)
NEUTROPHILS # BLD AUTO: 5.93 THOUSANDS/ΜL (ref 1.85–7.62)
NEUTS SEG NFR BLD AUTO: 75 % (ref 43–75)
PLATELET # BLD AUTO: 541 THOUSANDS/UL (ref 149–390)
PMV BLD AUTO: 8 FL (ref 8.9–12.7)
POTASSIUM SERPL-SCNC: 3.2 MMOL/L (ref 3.5–5.3)
PROT SERPL-MCNC: 5.4 G/DL (ref 6.4–8.2)
RBC # BLD AUTO: 3.2 MILLION/UL (ref 3.88–5.62)
SODIUM SERPL-SCNC: 136 MMOL/L (ref 136–145)
WBC # BLD AUTO: 7.88 THOUSAND/UL (ref 4.31–10.16)

## 2017-10-24 PROCEDURE — 80053 COMPREHEN METABOLIC PANEL: CPT

## 2017-10-24 PROCEDURE — 80197 ASSAY OF TACROLIMUS: CPT

## 2017-10-24 PROCEDURE — 36415 COLL VENOUS BLD VENIPUNCTURE: CPT

## 2017-10-24 PROCEDURE — 85025 COMPLETE CBC W/AUTO DIFF WBC: CPT

## 2017-10-26 ENCOUNTER — HOSPITAL ENCOUNTER (EMERGENCY)
Facility: HOSPITAL | Age: 47
Discharge: HOME | End: 2017-10-26
Admitting: EMERGENCY MEDICINE
Payer: COMMERCIAL

## 2017-10-26 ENCOUNTER — APPOINTMENT (EMERGENCY)
Dept: ULTRASOUND IMAGING | Facility: HOSPITAL | Age: 47
End: 2017-10-26
Payer: COMMERCIAL

## 2017-10-26 VITALS
SYSTOLIC BLOOD PRESSURE: 131 MMHG | BODY MASS INDEX: 16.64 KG/M2 | OXYGEN SATURATION: 99 % | WEIGHT: 97.44 LBS | DIASTOLIC BLOOD PRESSURE: 68 MMHG | HEART RATE: 94 BPM | TEMPERATURE: 97.8 F | RESPIRATION RATE: 16 BRPM | HEIGHT: 64 IN

## 2017-10-26 DIAGNOSIS — E83.51 HYPOCALCEMIA: Primary | ICD-10-CM

## 2017-10-26 DIAGNOSIS — R79.89 ELEVATED SERUM CREATININE: ICD-10-CM

## 2017-10-26 DIAGNOSIS — E87.8 LOW BICARBONATE LEVEL: ICD-10-CM

## 2017-10-26 DIAGNOSIS — Z94.0 RENAL TRANSPLANT RECIPIENT: ICD-10-CM

## 2017-10-26 LAB
ALBUMIN SERPL BCP-MCNC: 2.8 G/DL (ref 3.5–5)
ALP SERPL-CCNC: 248 U/L (ref 46–116)
ALT SERPL W P-5'-P-CCNC: 23 U/L (ref 12–78)
ANION GAP SERPL CALCULATED.3IONS-SCNC: 19 MMOL/L (ref 4–13)
APTT PPP: 37 SECONDS (ref 23–35)
AST SERPL W P-5'-P-CCNC: 18 U/L (ref 5–45)
ATRIAL RATE: 91 BPM
BACTERIA UR QL AUTO: ABNORMAL /HPF
BASOPHILS # BLD AUTO: 0.01 THOUSANDS/ΜL (ref 0–0.1)
BASOPHILS NFR BLD AUTO: 0 % (ref 0–1)
BILIRUB SERPL-MCNC: 0.3 MG/DL (ref 0.2–1)
BILIRUB UR QL STRIP: NEGATIVE
BUN SERPL-MCNC: 82 MG/DL (ref 5–25)
CALCIUM SERPL-MCNC: 7.2 MG/DL (ref 8.3–10.1)
CHLORIDE SERPL-SCNC: 106 MMOL/L (ref 100–108)
CLARITY UR: CLEAR
CO2 SERPL-SCNC: 14 MMOL/L (ref 21–32)
COLOR UR: ABNORMAL
CREAT SERPL-MCNC: 2.79 MG/DL (ref 0.6–1.3)
EOSINOPHIL # BLD AUTO: 0.06 THOUSAND/ΜL (ref 0–0.61)
EOSINOPHIL NFR BLD AUTO: 1 % (ref 0–6)
ERYTHROCYTE [DISTWIDTH] IN BLOOD BY AUTOMATED COUNT: 18.6 % (ref 11.6–15.1)
GFR SERPL CREATININE-BSD FRML MDRD: 26 ML/MIN/1.73SQ M
GLUCOSE SERPL-MCNC: 195 MG/DL (ref 65–140)
GLUCOSE UR STRIP-MCNC: NEGATIVE MG/DL
HCT VFR BLD AUTO: 29 % (ref 36.5–49.3)
HGB BLD-MCNC: 9.3 G/DL (ref 12–17)
HGB UR QL STRIP.AUTO: ABNORMAL
INR PPP: 1.12 (ref 0.86–1.16)
KETONES UR STRIP-MCNC: NEGATIVE MG/DL
LEUKOCYTE ESTERASE UR QL STRIP: NEGATIVE
LYMPHOCYTES # BLD AUTO: 0.86 THOUSANDS/ΜL (ref 0.6–4.47)
LYMPHOCYTES NFR BLD AUTO: 10 % (ref 14–44)
MAGNESIUM SERPL-MCNC: 1.7 MG/DL (ref 1.6–2.6)
MCH RBC QN AUTO: 26.3 PG (ref 26.8–34.3)
MCHC RBC AUTO-ENTMCNC: 32.1 G/DL (ref 31.4–37.4)
MCV RBC AUTO: 82 FL (ref 82–98)
MONOCYTES # BLD AUTO: 0.86 THOUSAND/ΜL (ref 0.17–1.22)
MONOCYTES NFR BLD AUTO: 10 % (ref 4–12)
NEUTROPHILS # BLD AUTO: 7.01 THOUSANDS/ΜL (ref 1.85–7.62)
NEUTS SEG NFR BLD AUTO: 79 % (ref 43–75)
NITRITE UR QL STRIP: NEGATIVE
NON-SQ EPI CELLS URNS QL MICRO: ABNORMAL /HPF
P AXIS: 58 DEGREES
PH UR STRIP.AUTO: 6 [PH] (ref 4.5–8)
PHOSPHATE SERPL-MCNC: 5.2 MG/DL (ref 2.7–4.5)
PLATELET # BLD AUTO: 536 THOUSANDS/UL (ref 149–390)
PMV BLD AUTO: 8 FL (ref 8.9–12.7)
POTASSIUM SERPL-SCNC: 3.7 MMOL/L (ref 3.5–5.3)
PR INTERVAL: 128 MS
PROT SERPL-MCNC: 6.4 G/DL (ref 6.4–8.2)
PROT UR STRIP-MCNC: ABNORMAL MG/DL
PROTHROMBIN TIME: 14.3 SECONDS (ref 12.1–14.4)
QRS AXIS: 43 DEGREES
QRSD INTERVAL: 84 MS
QT INTERVAL: 372 MS
QTC INTERVAL: 457 MS
RBC # BLD AUTO: 3.53 MILLION/UL (ref 3.88–5.62)
RBC #/AREA URNS AUTO: ABNORMAL /HPF
SODIUM SERPL-SCNC: 139 MMOL/L (ref 136–145)
SP GR UR STRIP.AUTO: 1.01 (ref 1–1.03)
T WAVE AXIS: 69 DEGREES
TACROLIMUS BLD LC/MS/MS-MCNC: 10.5 NG/ML (ref 2–20)
UROBILINOGEN UR QL STRIP.AUTO: 0.2 E.U./DL
VENTRICULAR RATE: 91 BPM
WBC # BLD AUTO: 8.8 THOUSAND/UL (ref 4.31–10.16)
WBC #/AREA URNS AUTO: ABNORMAL /HPF

## 2017-10-26 PROCEDURE — 96361 HYDRATE IV INFUSION ADD-ON: CPT

## 2017-10-26 PROCEDURE — 83735 ASSAY OF MAGNESIUM: CPT | Performed by: PHYSICIAN ASSISTANT

## 2017-10-26 PROCEDURE — 84100 ASSAY OF PHOSPHORUS: CPT | Performed by: PHYSICIAN ASSISTANT

## 2017-10-26 PROCEDURE — 85610 PROTHROMBIN TIME: CPT | Performed by: PHYSICIAN ASSISTANT

## 2017-10-26 PROCEDURE — 81001 URINALYSIS AUTO W/SCOPE: CPT | Performed by: PHYSICIAN ASSISTANT

## 2017-10-26 PROCEDURE — 85025 COMPLETE CBC W/AUTO DIFF WBC: CPT | Performed by: PHYSICIAN ASSISTANT

## 2017-10-26 PROCEDURE — 93005 ELECTROCARDIOGRAM TRACING: CPT | Performed by: PHYSICIAN ASSISTANT

## 2017-10-26 PROCEDURE — 85730 THROMBOPLASTIN TIME PARTIAL: CPT | Performed by: PHYSICIAN ASSISTANT

## 2017-10-26 PROCEDURE — 80053 COMPREHEN METABOLIC PANEL: CPT | Performed by: PHYSICIAN ASSISTANT

## 2017-10-26 PROCEDURE — 36415 COLL VENOUS BLD VENIPUNCTURE: CPT | Performed by: PHYSICIAN ASSISTANT

## 2017-10-26 PROCEDURE — 96365 THER/PROPH/DIAG IV INF INIT: CPT

## 2017-10-26 PROCEDURE — 76776 US EXAM K TRANSPL W/DOPPLER: CPT

## 2017-10-26 PROCEDURE — 99284 EMERGENCY DEPT VISIT MOD MDM: CPT

## 2017-10-26 RX ORDER — MYCOPHENOLATE MOFETIL 250 MG/1
1000 CAPSULE ORAL EVERY 12 HOURS SCHEDULED
COMMUNITY
End: 2018-07-19 | Stop reason: ALTCHOICE

## 2017-10-26 RX ORDER — SODIUM BICARBONATE 650 MG/1
650 TABLET ORAL 2 TIMES DAILY
Qty: 30 TABLET | Refills: 0 | Status: SHIPPED | OUTPATIENT
Start: 2017-10-26 | End: 2019-02-15 | Stop reason: ALTCHOICE

## 2017-10-26 RX ORDER — TACROLIMUS 0.5 MG/1
0.5 CAPSULE ORAL DAILY
COMMUNITY
End: 2018-12-20 | Stop reason: HOSPADM

## 2017-10-26 RX ORDER — PREDNISONE 1 MG/1
5 TABLET ORAL DAILY
COMMUNITY
End: 2020-02-03 | Stop reason: SDUPTHER

## 2017-10-26 RX ORDER — DIPHENOXYLATE HYDROCHLORIDE AND ATROPINE SULFATE 2.5; .025 MG/1; MG/1
1 TABLET ORAL DAILY
COMMUNITY
End: 2018-12-20 | Stop reason: HOSPADM

## 2017-10-26 RX ADMIN — CALCIUM GLUCONATE 1 G: 94 INJECTION, SOLUTION INTRAVENOUS at 12:20

## 2017-10-26 RX ADMIN — SODIUM CHLORIDE 1000 ML: 0.9 INJECTION, SOLUTION INTRAVENOUS at 15:51

## 2017-10-26 RX ADMIN — SODIUM CHLORIDE 1000 ML: 0.9 INJECTION, SOLUTION INTRAVENOUS at 11:58

## 2017-10-26 NOTE — ED NOTES
Pt observed sitting in wheelchair positioned towards door  Pt given update on plan of care, consents to IV fluids  IV fluids infusing appropriately  Pt offers no complaints at this time  Appropriate safety measures in place       Isrrael Hernandez RN  10/26/17 3898

## 2017-10-26 NOTE — ED PROVIDER NOTES
History  Chief Complaint   Patient presents with    Abnormal Lab     sent in [facility] for Abnormal   labs     51 yr male presents at the request of his nephrologist for abnormal routine labs  Type 1 diabetic with ESRD s/p renal transplant 2001 at [facility]  Transplant coordinator [phone] Shayy Ortega  Had routine labs 2 days ago 10/24/17 and was called by his transplant team today to "come to ED for iv calcium"  I reviewed the labs from 10/24/17, acute issues are hypokalemia 3 2, bun 88/cr 3 3, hypocalcemia 5 8  Baseline anemia 8-9, baseline creatinine is reportedly 1 5-1 7 per [facility]  Patient denies any symptoms- states he feels better than he has in a very long time  Only symptom is urinary frequency  No med changes, no diet changes  Took am doses today prior to arrival  BS are okay between (low 66 and high 214 this week) insulin coverage  He specifically denies f/c, headache, dizziness/imbalance, paresthesias, weakness, speech disturbance, uri sx, cough, cp/sob, abdominal pain, diarrhea, dysuria, flank pain, bruising, rash, or edema  He was critically ill back in September 2017 from DKA, postop tib/fib osteomyelitis/cellulitis, was transferred from our ICU here to [facility] ICU intubated on pressors  He had outpatient labs last on 10/7/17 which were also abnormal, unclear what the followup or treatment was in the meantime  Prior to Admission Medications   Prescriptions Last Dose Informant Patient Reported?  Taking?   insulin detemir (LEVEMIR) 100 units/mL subcutaneous injection 10/25/2017 at Unknown time  Yes Yes   Sig: Inject 10 Units under the skin 2 (two) times a day   insulin lispro (HumaLOG) 100 units/mL injection 10/25/2017 at Unknown time  Yes Yes   Sig: Inject under the skin 3 (three) times a day before meals   multivitamin (THERAGRAN) TABS 10/25/2017 at Unknown time  Yes Yes   Sig: Take 1 tablet by mouth daily   mycophenolate (CELLCEPT) 250 mg capsule 10/25/2017 at Unknown time  Yes Yes Sig: Take 1,000 mg by mouth every 12 (twelve) hours 3 tabs   predniSONE 5 mg tablet 10/25/2017 at Unknown time  Yes Yes   Sig: Take 5 mg by mouth daily   tacrolimus (PROGRAF) 0 5 mg capsule 10/25/2017 at Unknown time  Yes Yes   Sig: Take 0 5 mg by mouth 2 (two) times a day Take 2 tabs      Facility-Administered Medications: None       Past Medical History:   Diagnosis Date    Diabetes mellitus ([facility] )     Diabetes mellitus type 1 ([facility] )     Hypertension     Renal failure     Renal transplant, status post 07/21/2007       Past Surgical History:   Procedure Laterality Date    ANKLE FRACTURE SURGERY      ANKLE HARDWARE REMOVAL Right 7/31/2017    Procedure: REMOVAL HARDWARE ANKLE;  Surgeon: Shy Marino MD;  Location: [facility];  Service: Orthopedics    ANKLE HARDWARE REMOVAL Right 8/17/2017    Procedure: TIBIA FAILED HARDWARE REMOVAL;  Surgeon: Shy Marino MD;  Location: [facility];  Service: Orthopedics    CLOSED REDUCTION ANKLE Right 7/3/2017    Procedure: CLOSED REDUCTION DISTAL TIB-FIB AND CASTING VS;  Surgeon: Shy Marino MD;  Location: [facility];  Service: Orthopedics    EYE SURGERY      FRACTURE SURGERY      ORIF Rt Ankle    GLUTAMIC ACID DECARBOXYLASE (HISTORICAL)      NEPHRECTOMY TRANSPLANTED ORGAN      MD OPEN TREATMENT FRACTURE DISTAL TIBIA FIBULA Right 7/3/2017    Procedure: OPEN REDUCTION W/ INTERNAL FIXATION (ORIF); Surgeon: Shy Marino MD;  Location: [facility];  Service: Orthopedics    TOE AMPUTATION Right 10/27/2016    Procedure: AMPUTATION TOE;  Surgeon: Jerilyn Mcdonough DPM;  Location: [facility];  Service:        Family History   Problem Relation Age of Onset    Diabetes Brother      I have reviewed and agree with the history as documented      Social History   Substance Use Topics    Smoking status: Never Smoker    Smokeless tobacco: Never Used    Alcohol use No        Review of Systems   Constitutional: Negative for activity change, appetite change, chills, diaphoresis, fatigue, fever and unexpected weight change  HENT: Negative for congestion, ear pain, facial swelling, hearing loss, rhinorrhea, sinus pressure, sore throat and tinnitus  Eyes: Negative for photophobia, pain, redness, itching and visual disturbance  Respiratory: Negative for cough, chest tightness and wheezing  Cardiovascular: Negative for chest pain, palpitations and leg swelling  Gastrointestinal: Negative for abdominal pain, constipation, diarrhea, nausea and vomiting  Genitourinary: Negative for dysuria, flank pain, frequency, hematuria, testicular pain and urgency  Musculoskeletal: Positive for gait problem (is s/p ankle surgery- WC bound for now- still in cast/boot)  Negative for arthralgias, back pain and myalgias  Skin: Negative for color change, pallor, rash and wound  Neurological: Negative for dizziness, tremors, syncope and headaches  Physical Exam  ED Triage Vitals [10/26/17 1018]   Temperature Pulse Respirations Blood Pressure SpO2   97 8 °F (36 6 °C) 93 20 145/72 100 %      Temp Source Heart Rate Source Patient Position - Orthostatic VS BP Location FiO2 (%)   Temporal Monitor Sitting Left arm --      Pain Score       No Pain           Orthostatic Vital Signs  Vitals:    10/26/17 1230 10/26/17 1300 10/26/17 1442 10/26/17 1530   BP: 146/74 156/75 140/71 154/72   Pulse: 91 89 88 89   Patient Position - Orthostatic VS:   Sitting Sitting       Physical Exam   Constitutional: He is oriented to person, place, and time  Vital signs are normal   Non-toxic appearance  No distress  Chronically ill, appears well today, quite talkative and mobile   HENT:   Head: Normocephalic and atraumatic  Right Ear: Tympanic membrane and external ear normal    Left Ear: Tympanic membrane and external ear normal    Nose: Nose normal  No rhinorrhea     Mouth/Throat: Uvula is midline and oropharynx is clear and moist    Eyes: Conjunctivae, EOM and lids are normal  Pupils are equal, round, and reactive to light  Neck: Full passive range of motion without pain  Neck supple  Cardiovascular: Normal rate, regular rhythm, normal heart sounds and intact distal pulses  No murmur heard  Pulmonary/Chest: Effort normal and breath sounds normal  No accessory muscle usage  No tachypnea  No respiratory distress  He has no wheezes  Abdominal: Soft  Normal appearance and bowel sounds are normal  He exhibits no distension  There is no hepatosplenomegaly  There is no tenderness  There is no CVA tenderness  No hernia  Musculoskeletal: Normal range of motion  He exhibits no edema (mild, ankles) or tenderness  RLE cast boot s/p ankle surgery   Neurological: He is alert and oriented to person, place, and time  Skin: Skin is warm, dry and intact  Capillary refill takes less than 2 seconds  No rash noted  He is not diaphoretic  No erythema  No pallor  Psychiatric: He has a normal mood and affect  His speech is normal and behavior is normal    Nursing note and vitals reviewed        ED Medications  Medications   sodium chloride 0 9 % bolus 1,000 mL (1,000 mL Intravenous New Bag 10/26/17 1551)   sodium chloride 0 9 % bolus 1,000 mL (0 mL Intravenous Stopped 10/26/17 1425)   calcium gluconate 1 g in sodium chloride 0 9 % 100 mL IVPB (0 g Intravenous Stopped 10/26/17 1307)       Diagnostic Studies  Results Reviewed     Procedure Component Value Units Date/Time    Urine Microscopic [[id number]]  (Abnormal) Collected:  10/26/17 1334    Lab Status:  Final result Specimen:  Urine from Urine, Clean Catch Updated:  10/26/17 1348     RBC, UA 0-1 (A) /hpf      WBC, UA 0-1 (A) /hpf      Epithelial Cells None Seen /hpf      Bacteria, UA None Seen /hpf     UA w Reflex to Microscopic w Reflex to Culture [[id number]]  (Abnormal) Collected:  10/26/17 1334    Lab Status:  Final result Specimen:  Urine from Urine, Clean Catch Updated:  10/26/17 1341     Color, UA Light Yellow     Clarity, UA Clear     Specific [place], UA 1 015 pH, UA 6 0     Leukocytes, UA Negative     Nitrite, UA Negative     Protein, UA 30 (1+) (A) mg/dl      Glucose, UA Negative mg/dl      Ketones, UA Negative mg/dl      Urobilinogen, UA 0 2 E U /dl      Bilirubin, UA Negative     Blood, UA Trace-Intact    Comprehensive metabolic panel [[id number]]  (Abnormal) Collected:  10/26/17 1045    Lab Status:  Final result Specimen:  Blood from Arm, Left Updated:  10/26/17 1115     Sodium 139 mmol/L      Potassium 3 7 mmol/L      Chloride 106 mmol/L      CO2 14 (L) mmol/L      Anion Gap 19 (H) mmol/L      BUN 82 (H) mg/dL      Creatinine 2 79 (H) mg/dL      Glucose 195 (H) mg/dL      Calcium 7 2 (L) mg/dL      AST 18 U/L      ALT 23 U/L      Alkaline Phosphatase 248 (H) U/L      Total Protein 6 4 g/dL      Albumin 2 8 (L) g/dL      Total Bilirubin 0 30 mg/dL      eGFR 26 ml/min/1 73sq m     Narrative:         National Kidney Disease Education Program recommendations are as follows:  GFR calculation is accurate only with a steady state creatinine  Chronic Kidney disease less than 60 ml/min/1 73 sq  meters  Kidney failure less than 15 ml/min/1 73 sq  meters  Magnesium [[id number]]  (Normal) Collected:  10/26/17 1045    Lab Status:  Final result Specimen:  Blood from Arm, Left Updated:  10/26/17 1115     Magnesium 1 7 mg/dL     Phosphorus [[id number]]  (Abnormal) Collected:  10/26/17 1045    Lab Status:  Final result Specimen:  Blood from Arm, Left Updated:  10/26/17 1115     Phosphorus 5 2 (H) mg/dL     Protime-INR [[id number]]  (Normal) Collected:  10/26/17 1045    Lab Status:  Final result Specimen:  Blood from Arm, Left Updated:  10/26/17 1108     Protime 14 3 seconds      INR 1 12    APTT [[id number]]  (Abnormal) Collected:  10/26/17 1045    Lab Status:  Final result Specimen:  Blood from Arm, Left Updated:  10/26/17 1108     PTT 37 (H) seconds     Narrative:          Therapeutic Heparin Range = 60-90 seconds    CBC and differential [[id number]]  (Abnormal) Collected:  10/26/17 1045 Lab Status:  Final result Specimen:  Blood from Arm, Left Updated:  10/26/17 1058     WBC 8 80 Thousand/uL      RBC 3 53 (L) Million/uL      Hemoglobin 9 3 (L) g/dL      Hematocrit 29 0 (L) %      MCV 82 fL      MCH 26 3 (L) pg      MCHC 32 1 g/dL      RDW 18 6 (H) %      MPV 8 0 (L) fL      Platelets 032 (H) Thousands/uL      Neutrophils Relative 79 (H) %      Lymphocytes Relative 10 (L) %      Monocytes Relative 10 %      Eosinophils Relative 1 %      Basophils Relative 0 %      Neutrophils Absolute 7 01 Thousands/µL      Lymphocytes Absolute 0 86 Thousands/µL      Monocytes Absolute 0 86 Thousand/µL      Eosinophils Absolute 0 06 Thousand/µL      Basophils Absolute 0 01 Thousands/µL                  US transplant kidney with doppler   Final Result by Dinora Rios MD (10/26 1422)      No hydronephrosis  Renal artery and vein patent  Increased renal cortical echogenicity again seen suggesting underlying inflammation  Resistive indices ranging from 0 77-0 85  Workstation performed: [id number]                    Procedures  ECG 12 Lead Documentation  Date/Time: 10/26/2017 11:09 AM  Performed by: Hansa Boss  Authorized by: Hansa Boss     Indications / Diagnosis:  Abnormal labs  ECG reviewed by me, the ED Provider: yes    Patient location:  ED  Rate:     ECG rate:  91  Rhythm:     Rhythm: sinus rhythm    Ectopy:     Ectopy: none    QRS:     QRS axis:  Normal  Conduction:     Conduction: normal    ST segments:     ST segments:  Normal  T waves:     T waves: normal             Phone Contacts  ED Phone Contact    ED Course  ED Course as of Oct 26 1727   Thu Oct 26, 2017   [address] [facility] Transplant coordinator Shayy Jordan @ [facility] [phone]  Renal transplant 2001      1115 PTT: (!) 37   1115 WBC: 8 80   1115 Hemoglobin: (!) 9 3   1115 Hematocrit: (!) 29 0   1115 Platelets: (!) 231   1115 Phosphorus: (!) 5 2   1115 Magnesium: 1 7   1115 Sodium: 139   1115 Potassium: 3 7   1115 Chloride: 106   1115 CO2: (!) 14 1115 Anion Gap: (!) 19   1115 BUN: (!) 82   1115 Glucose: (!) 195   1115 Calcium: (!) 7 2   1115 AST: 18   1115 Alkaline Phosphatase: (!) 248   1115 Total Protein: 6 4   1115 Total Bilirubin: 0 30   1115 eGFR: 26   1144 I spoke with [facility] transplant [facility] note from pt's doc as follows-   "Recommend ED evaluation for stat ua & culture, ultrasound transplant, bladder scan with pvr, ionized calcium/iv calcium if calcemic  IVFs  Follow tacrolimus level  Can be seen by dr Fitz Champion at [facility] "  Dr Charmayne Faes  Prior creatinine 1 5 and 1 7    1227 Paged nephrology    1401 Color, UA: Light Yellow   1401 Clarity, UA: Clear   1401 pH, UA: 6 0   1401 Nitrite, UA: Negative   1402 Blood, UA: Trace-Intact   1407 Pt at ultrasound presently  Will re-page nephrology    3751 Paged nephrology third time    1501 I spoke with dr Pawan Porras office staff will have dr Sean Manley call (on-call provider) back to ED she is in with a patient  1530 I spoke with dr Jerez Peer reviewed pt history and current labs  Recommends additional 1L ns and start sodium bicarb tablets 650mg bid  1710 Pt ready for discharge, second liter bolus is complete  Remains asymptomatic, is eager to go home  Reviewed f/u labs in 5 days and establish/new appt with dr Solitario Hogan next week  RTED with and new/worse symptoms or concerns  MDM  Number of Diagnoses or Management Options  Elevated serum creatinine: established and improving  Hypocalcemia: established and improving  Low bicarbonate level:   Renal transplant recipient:   Diagnosis management comments: 51 yr male s/p renal transplant sent in for treatment of metabolic abnormalities on routine labs  Pt is asymptomatic  Will d/w transplant team and nephrology  Amount and/or Complexity of Data Reviewed  Clinical lab tests: ordered and reviewed  Review and summarize past medical records: yes  Discuss the patient with other providers: yes (Transplant coordinator [facility]   Also dr Sean Manley nephrology )  Independent visualization of images, tracings, or specimens: yes    Patient Progress  Patient progress: stable    CritCare Time    Disposition  Final diagnoses:   Hypocalcemia   Elevated serum creatinine   Low bicarbonate level   Renal transplant recipient     Time reflects when diagnosis was documented in both MDM as applicable and the Disposition within this note     Time User Action Codes Description Comment    10/26/2017  4:40 PM Shabana Fosteredler [E83 51] Hypocalcemia     10/26/2017  4:40 PM Ommolly Slime [R79 89] Elevated serum creatinine     10/26/2017  4:40 PM Laveta Lines Add [E87 8] Low bicarbonate level     10/26/2017  4:41 PM Laveta Lines Add [Z94 0] Renal transplant recipient       ED Disposition     ED Disposition Condition Comment    Discharge  Franklyn Oriana discharge to home/self care  Condition at discharge: Good        Follow-up Information     Follow up With Specialties Details Why Contact Info    Odilon Ham DO Family Medicine Schedule an appointment as soon as possible for a visit in 1 day  [address] [place] [address]      Jeannine Pablo MD Nephrology Schedule an appointment as soon as possible for a visit in 3 days see in office next week [address] [place] [id number]  [phone]          Patient's Medications   Discharge Prescriptions    SODIUM BICARBONATE 650 MG TABLET    Take 1 tablet by mouth 2 (two) times a day       Start Date: 10/26/2017End Date: --       Order Dose: 650 mg       Quantity: 30 tablet    Refills: 0       Outpatient Discharge Orders  Comprehensive metabolic panel   Standing Status: Future  Standing Exp  Date: 11/26/17     Tacrolimus level   Standing Status: Future  Standing Exp  Date: 11/26/17     Magnesium   Standing Status: Future  Standing Exp   Date: 10/26/18         ED Provider  Electronically Signed by           Clay Akins PA-C  10/26/17 0492

## 2017-10-26 NOTE — DISCHARGE INSTRUCTIONS
Hypocalcemia   WHAT YOU NEED TO KNOW:   Hypocalcemia is a low level of calcium in your blood  It occurs when your body loses too much calcium or does not absorb enough from the foods you eat  DISCHARGE INSTRUCTIONS:   Medicines:   · Medicines  will be given to bring your calcium and vitamin D levels back to normal  You may also need medicines to prevent bone loss  · Take your medicine as directed  Contact your healthcare provider if you think your medicine is not helping or if you have side effects  Tell him of her if you are allergic to any medicine  Keep a list of the medicines, vitamins, and herbs you take  Include the amounts, and when and why you take them  Bring the list or the pill bottles to follow-up visits  Carry your medicine list with you in case of an emergency  Follow up with your healthcare provider or endocrinologist every 3 to 6 months, or as directed: You will need to return to have your calcium levels checked  Bring a list of any questions you have so you remember to ask them during your visits  Eat foods rich in calcium:  Foods that contain calcium include milk, yogurt, cereals, and cheese  Leafy green vegetables, oranges, canned salmon, shrimp, and peanuts also contain calcium  Do not have caffeine or alcohol  These can slow your body's ability to absorb calcium  You may need to meet with a dietitian to help plan the best meals for you  Get safe amounts of sunlight: You may need to expose your skin to more sunlight if your body lacks vitamin D  Ask your healthcare provider how to safely expose yourself to UV light if you need it  Do not smoke: If you smoke, it is never too late to quit  Smoking increases the amount of calcium that leaves your body through your urine  Ask your healthcare provider for information if you need help quitting  Contact your healthcare provider or endocrinologist if:   · You have dry skin and brittle nails       · Your symptoms do not go away, or they get worse     · You feel depressed, anxious, angry, or confused  · You have questions or concerns about your condition or care  Return to the emergency department if:   · You have a seizure  · You have a slow or uneven heartbeat and feel lightheaded  · You see or hear things that are not really there  © 2017 [employer] Information is for End User's use only and may not be sold, redistributed or otherwise used for commercial purposes  All illustrations and images included in CareNotes® are the copyrighted property of [employer]  or [employer]  The above information is an  only  It is not intended as medical advice for individual conditions or treatments  Talk to your doctor, nurse or pharmacist before following any medical regimen to see if it is safe and effective for you  Acute Kidney Injury   AMBULATORY CARE:   Acute kidney injury (MIKE) is also called acute kidney failure, or acute renal failure  MIKE happens when your kidneys suddenly stop working correctly  Normally, the kidneys remove fluid, chemicals, and waste from your blood  These wastes are turned into urine by your kidneys  MIKE usually happens over hours or days  When you have MIKE, your kidneys do not remove the waste, chemicals, or extra fluid from your body  A normal amount of urine is not produced  MIKE is usually temporary, but it may become a chronic kidney condition  Causes of MIKE:   · Decreased blood flow to the kidney, such as from hypercalcemia (high blood calcium level) or severe heart disease     · A disease or condition that affects the kidneys, such as hypertension (high blood pressure) or diabetes     · A blockage in the kidney or ureter, such as a kidney or bladder stone, enlarged prostate, or tumor  Common symptoms include the following: You may not have any symptoms with early or mild MIKE   As MIKE progresses, you may have any of the following:  · Decrease in the amount of urine or no urination    · Swelling in your arms, legs, or feet     · Weakness, drowsiness, or no appetite    · Nausea, flank pain, muscle twitching or muscle cramps    · Itchy skin, or your, breath or body smells like urine    · Behavior changes, confusion, disorientation, or seizures  Call 911 if:   · You have sudden chest pain or trouble breathing  Seek care immediately if:   · Your symptoms get worse  Contact your healthcare provider if:   · Your symptoms return  · Your blood sugar or blood pressure level is not within the range your healthcare provider recommends  · You have questions or concerns about your condition or care  Treatment for MIKE  depends upon the cause of your acute kidney injury and how severe it is  Usually, MIKE will be monitored in the hospital  If you have mild MIKE, you may be able to go home to recover  Your healthcare providers will treat the cause of your MIKE  You may need IV fluids if your MIKE was caused by little or no fluid in your body  You may need dialysis to remove waste and extra fluid from your body  Nutrition:  Your healthcare provider may tell you to eat food low in sodium (salt), potassium, phosphorus, or protein  A dietitian can help you plan your meals  Drink liquids as directed: Your healthcare provider may recommend that you drink a certain amount of liquids  This will help your kidneys work better and decrease your risk for dehydration  Ask how much liquid to drink each day and which liquids are best for you  What you can do to manage and prevent MIKE:   · Monitor and manage other health conditions  such as diabetes, high blood pressure, or heart disease  These conditions increase your risk for acute kidney injury  Take your medicines for these conditions as directed  Also, monitor your blood sugar and blood pressure levels as directed  Contact your healthcare provider if your levels are not in the range he or she says it should be      · Talk to your healthcare provider before you take over-the-counter-medicine  NSAIDs, stomach medicine, or laxatives may harm your kidneys and increase your risk for acute kidney injury  If it is okay to take the medicine, follow the directions on the package  Do not take more than directed  · Tell healthcare providers you have had acute kidney injury  before you get contrast liquid for an x-ray or CT scan  Your healthcare provider may give you medicine to prevent kidney problems caused by the liquid  Follow up with your healthcare provider as directed:  Write down your questions so you remember to ask them during your visits  © 2017 [address] [facility] Information is for End User's use only and may not be sold, redistributed or otherwise used for commercial purposes  All illustrations and images included in CareNotes® are the copyrighted property of [employer] M , Inc  or [employer]  The above information is an  only  It is not intended as medical advice for individual conditions or treatments  Talk to your doctor, nurse or pharmacist before following any medical regimen to see if it is safe and effective for you

## 2017-11-04 ENCOUNTER — TRANSCRIBE ORDERS (OUTPATIENT)
Dept: ADMINISTRATIVE | Facility: HOSPITAL | Age: 47
End: 2017-11-04

## 2017-11-04 ENCOUNTER — APPOINTMENT (OUTPATIENT)
Dept: LAB | Facility: HOSPITAL | Age: 47
End: 2017-11-04
Attending: EMERGENCY MEDICINE
Payer: COMMERCIAL

## 2017-11-04 ENCOUNTER — APPOINTMENT (OUTPATIENT)
Dept: LAB | Facility: HOSPITAL | Age: 47
End: 2017-11-04
Payer: COMMERCIAL

## 2017-11-04 DIAGNOSIS — R79.89 ELEVATED SERUM CREATININE: ICD-10-CM

## 2017-11-04 DIAGNOSIS — Z01.818 PREOP EXAMINATION: ICD-10-CM

## 2017-11-04 DIAGNOSIS — Z94.0 KIDNEY REPLACED BY TRANSPLANT: Primary | ICD-10-CM

## 2017-11-04 DIAGNOSIS — Z94.0 RENAL TRANSPLANT RECIPIENT: ICD-10-CM

## 2017-11-04 DIAGNOSIS — E83.51 HYPOCALCEMIA: ICD-10-CM

## 2017-11-04 DIAGNOSIS — E87.8 LOW BICARBONATE LEVEL: ICD-10-CM

## 2017-11-04 DIAGNOSIS — Z94.0 KIDNEY REPLACED BY TRANSPLANT: ICD-10-CM

## 2017-11-04 DIAGNOSIS — M14.671 CHARCOT'S JOINT OF ANKLE, RIGHT: Primary | ICD-10-CM

## 2017-11-04 DIAGNOSIS — M14.671 CHARCOT'S JOINT OF ANKLE, RIGHT: ICD-10-CM

## 2017-11-04 LAB
ALBUMIN SERPL BCP-MCNC: 2.6 G/DL (ref 3.5–5)
ALP SERPL-CCNC: 327 U/L (ref 46–116)
ALT SERPL W P-5'-P-CCNC: 18 U/L (ref 12–78)
ANION GAP SERPL CALCULATED.3IONS-SCNC: 17 MMOL/L (ref 4–13)
AST SERPL W P-5'-P-CCNC: 10 U/L (ref 5–45)
BASOPHILS # BLD AUTO: 0.02 THOUSANDS/ΜL (ref 0–0.1)
BASOPHILS NFR BLD AUTO: 0 % (ref 0–1)
BILIRUB SERPL-MCNC: 0.2 MG/DL (ref 0.2–1)
BUN SERPL-MCNC: 60 MG/DL (ref 5–25)
CALCIUM SERPL-MCNC: 6.5 MG/DL (ref 8.3–10.1)
CHLORIDE SERPL-SCNC: 108 MMOL/L (ref 100–108)
CO2 SERPL-SCNC: 12 MMOL/L (ref 21–32)
CREAT SERPL-MCNC: 2.12 MG/DL (ref 0.6–1.3)
CRP SERPL QL: 7.7 MG/L
EOSINOPHIL # BLD AUTO: 0.15 THOUSAND/ΜL (ref 0–0.61)
EOSINOPHIL NFR BLD AUTO: 2 % (ref 0–6)
ERYTHROCYTE [DISTWIDTH] IN BLOOD BY AUTOMATED COUNT: 18.9 % (ref 11.6–15.1)
ERYTHROCYTE [SEDIMENTATION RATE] IN BLOOD: 12 MM/HOUR (ref 0–10)
FERRITIN SERPL-MCNC: 183 NG/ML (ref 8–388)
GFR SERPL CREATININE-BSD FRML MDRD: 36 ML/MIN/1.73SQ M
GLUCOSE P FAST SERPL-MCNC: 97 MG/DL (ref 65–99)
HCT VFR BLD AUTO: 29.9 % (ref 36.5–49.3)
HGB BLD-MCNC: 9.4 G/DL (ref 12–17)
IRON SERPL-MCNC: 42 UG/DL (ref 65–175)
LYMPHOCYTES # BLD AUTO: 1.02 THOUSANDS/ΜL (ref 0.6–4.47)
LYMPHOCYTES NFR BLD AUTO: 14 % (ref 14–44)
MAGNESIUM SERPL-MCNC: 1.2 MG/DL (ref 1.6–2.6)
MCH RBC QN AUTO: 26.8 PG (ref 26.8–34.3)
MCHC RBC AUTO-ENTMCNC: 31.4 G/DL (ref 31.4–37.4)
MCV RBC AUTO: 85 FL (ref 82–98)
MONOCYTES # BLD AUTO: 1.15 THOUSAND/ΜL (ref 0.17–1.22)
MONOCYTES NFR BLD AUTO: 16 % (ref 4–12)
NEUTROPHILS # BLD AUTO: 5.04 THOUSANDS/ΜL (ref 1.85–7.62)
NEUTS SEG NFR BLD AUTO: 68 % (ref 43–75)
PHOSPHATE SERPL-MCNC: 5 MG/DL (ref 2.7–4.5)
PLATELET # BLD AUTO: 339 THOUSANDS/UL (ref 149–390)
PMV BLD AUTO: 8.6 FL (ref 8.9–12.7)
POTASSIUM SERPL-SCNC: 3.3 MMOL/L (ref 3.5–5.3)
PROT SERPL-MCNC: 5.4 G/DL (ref 6.4–8.2)
PTH-INTACT SERPL-MCNC: 937.6 PG/ML (ref 14–72)
RBC # BLD AUTO: 3.51 MILLION/UL (ref 3.88–5.62)
SODIUM SERPL-SCNC: 137 MMOL/L (ref 136–145)
TIBC SERPL-MCNC: 194 UG/DL (ref 250–450)
TRANSFERRIN SERPL-MCNC: 162 MG/DL (ref 200–400)
WBC # BLD AUTO: 7.38 THOUSAND/UL (ref 4.31–10.16)

## 2017-11-04 PROCEDURE — 85025 COMPLETE CBC W/AUTO DIFF WBC: CPT

## 2017-11-04 PROCEDURE — 83540 ASSAY OF IRON: CPT

## 2017-11-04 PROCEDURE — 36415 COLL VENOUS BLD VENIPUNCTURE: CPT

## 2017-11-04 PROCEDURE — 84100 ASSAY OF PHOSPHORUS: CPT

## 2017-11-04 PROCEDURE — 80053 COMPREHEN METABOLIC PANEL: CPT

## 2017-11-04 PROCEDURE — 80197 ASSAY OF TACROLIMUS: CPT

## 2017-11-04 PROCEDURE — 83735 ASSAY OF MAGNESIUM: CPT

## 2017-11-04 PROCEDURE — 83550 IRON BINDING TEST: CPT

## 2017-11-04 PROCEDURE — 83970 ASSAY OF PARATHORMONE: CPT

## 2017-11-04 PROCEDURE — 85652 RBC SED RATE AUTOMATED: CPT

## 2017-11-04 PROCEDURE — 82728 ASSAY OF FERRITIN: CPT

## 2017-11-04 PROCEDURE — 84466 ASSAY OF TRANSFERRIN: CPT

## 2017-11-04 PROCEDURE — 86140 C-REACTIVE PROTEIN: CPT

## 2017-11-06 ENCOUNTER — GENERIC CONVERSION - ENCOUNTER (OUTPATIENT)
Dept: OTHER | Facility: OTHER | Age: 47
End: 2017-11-06

## 2017-11-07 LAB — TACROLIMUS BLD LC/MS/MS-MCNC: 18.6 NG/ML (ref 2–20)

## 2017-11-08 ENCOUNTER — GENERIC CONVERSION - ENCOUNTER (OUTPATIENT)
Dept: OTHER | Facility: OTHER | Age: 47
End: 2017-11-08

## 2017-11-20 ENCOUNTER — GENERIC CONVERSION - ENCOUNTER (OUTPATIENT)
Dept: OTHER | Facility: OTHER | Age: 47
End: 2017-11-20

## 2017-11-30 ENCOUNTER — TRANSCRIBE ORDERS (OUTPATIENT)
Dept: ADMINISTRATIVE | Facility: HOSPITAL | Age: 47
End: 2017-11-30

## 2017-11-30 ENCOUNTER — APPOINTMENT (OUTPATIENT)
Dept: LAB | Facility: HOSPITAL | Age: 47
End: 2017-11-30
Payer: COMMERCIAL

## 2017-11-30 DIAGNOSIS — Z79.899 PHARMACOLOGIC THERAPY: ICD-10-CM

## 2017-11-30 DIAGNOSIS — E83.51 HYPOCALCEMIA: Primary | ICD-10-CM

## 2017-11-30 DIAGNOSIS — E83.51 HYPOCALCEMIA: ICD-10-CM

## 2017-11-30 LAB
ALBUMIN SERPL BCP-MCNC: 2.3 G/DL (ref 3.5–5)
ALP SERPL-CCNC: 283 U/L (ref 46–116)
ALT SERPL W P-5'-P-CCNC: 31 U/L (ref 12–78)
ANION GAP SERPL CALCULATED.3IONS-SCNC: 12 MMOL/L (ref 4–13)
AST SERPL W P-5'-P-CCNC: 38 U/L (ref 5–45)
BILIRUB SERPL-MCNC: 0.2 MG/DL (ref 0.2–1)
BUN SERPL-MCNC: 43 MG/DL (ref 5–25)
CALCIUM SERPL-MCNC: 6.4 MG/DL (ref 8.3–10.1)
CHLORIDE SERPL-SCNC: 111 MMOL/L (ref 100–108)
CO2 SERPL-SCNC: 17 MMOL/L (ref 21–32)
CREAT SERPL-MCNC: 1.65 MG/DL (ref 0.6–1.3)
GFR SERPL CREATININE-BSD FRML MDRD: 48 ML/MIN/1.73SQ M
GLUCOSE P FAST SERPL-MCNC: 73 MG/DL (ref 65–99)
POTASSIUM SERPL-SCNC: 4.1 MMOL/L (ref 3.5–5.3)
PROT SERPL-MCNC: 5.2 G/DL (ref 6.4–8.2)
SODIUM SERPL-SCNC: 140 MMOL/L (ref 136–145)

## 2017-11-30 PROCEDURE — 80197 ASSAY OF TACROLIMUS: CPT

## 2017-11-30 PROCEDURE — 36415 COLL VENOUS BLD VENIPUNCTURE: CPT

## 2017-11-30 PROCEDURE — 80053 COMPREHEN METABOLIC PANEL: CPT

## 2017-12-02 LAB — TACROLIMUS BLD LC/MS/MS-MCNC: 10 NG/ML (ref 2–20)

## 2017-12-15 ENCOUNTER — LAB REQUISITION (OUTPATIENT)
Dept: LAB | Facility: HOSPITAL | Age: 47
End: 2017-12-15
Payer: COMMERCIAL

## 2017-12-15 DIAGNOSIS — Z79.2 LONG TERM CURRENT USE OF ANTIBIOTICS: ICD-10-CM

## 2017-12-15 LAB
ALBUMIN SERPL BCP-MCNC: 2.4 G/DL (ref 3.5–5)
ALP SERPL-CCNC: 282 U/L (ref 46–116)
ALT SERPL W P-5'-P-CCNC: 36 U/L (ref 12–78)
ANION GAP SERPL CALCULATED.3IONS-SCNC: 11 MMOL/L (ref 4–13)
AST SERPL W P-5'-P-CCNC: 35 U/L (ref 5–45)
BASOPHILS # BLD AUTO: 0.02 THOUSANDS/ΜL (ref 0–0.1)
BASOPHILS NFR BLD AUTO: 0 % (ref 0–1)
BILIRUB SERPL-MCNC: 0.21 MG/DL (ref 0.2–1)
BUN SERPL-MCNC: 36 MG/DL (ref 5–25)
CALCIUM SERPL-MCNC: 7.1 MG/DL (ref 8.3–10.1)
CHLORIDE SERPL-SCNC: 117 MMOL/L (ref 100–108)
CO2 SERPL-SCNC: 17 MMOL/L (ref 21–32)
CREAT SERPL-MCNC: 1.41 MG/DL (ref 0.6–1.3)
CRP SERPL QL: <3 MG/L
EOSINOPHIL # BLD AUTO: 0.23 THOUSAND/ΜL (ref 0–0.61)
EOSINOPHIL NFR BLD AUTO: 3 % (ref 0–6)
ERYTHROCYTE [DISTWIDTH] IN BLOOD BY AUTOMATED COUNT: 20.2 % (ref 11.6–15.1)
GFR SERPL CREATININE-BSD FRML MDRD: 58 ML/MIN/1.73SQ M
GLUCOSE SERPL-MCNC: 61 MG/DL (ref 65–140)
HCT VFR BLD AUTO: 26.2 % (ref 36.5–49.3)
HGB BLD-MCNC: 8.2 G/DL (ref 12–17)
LYMPHOCYTES # BLD AUTO: 0.85 THOUSANDS/ΜL (ref 0.6–4.47)
LYMPHOCYTES NFR BLD AUTO: 10 % (ref 14–44)
MCH RBC QN AUTO: 29 PG (ref 26.8–34.3)
MCHC RBC AUTO-ENTMCNC: 31.3 G/DL (ref 31.4–37.4)
MCV RBC AUTO: 93 FL (ref 82–98)
MONOCYTES # BLD AUTO: 0.59 THOUSAND/ΜL (ref 0.17–1.22)
MONOCYTES NFR BLD AUTO: 7 % (ref 4–12)
NEUTROPHILS # BLD AUTO: 6.42 THOUSANDS/ΜL (ref 1.85–7.62)
NEUTS SEG NFR BLD AUTO: 80 % (ref 43–75)
NRBC BLD AUTO-RTO: 0 /100 WBCS
POTASSIUM SERPL-SCNC: 5.3 MMOL/L (ref 3.5–5.3)
PROT SERPL-MCNC: 5.7 G/DL (ref 6.4–8.2)
RBC # BLD AUTO: 2.83 MILLION/UL (ref 3.88–5.62)
SODIUM SERPL-SCNC: 145 MMOL/L (ref 136–145)
WBC # BLD AUTO: 8.2 THOUSAND/UL (ref 4.31–10.16)

## 2017-12-15 PROCEDURE — 80202 ASSAY OF VANCOMYCIN: CPT | Performed by: FAMILY MEDICINE

## 2017-12-15 PROCEDURE — 80053 COMPREHEN METABOLIC PANEL: CPT | Performed by: FAMILY MEDICINE

## 2017-12-15 PROCEDURE — 86140 C-REACTIVE PROTEIN: CPT | Performed by: FAMILY MEDICINE

## 2017-12-15 PROCEDURE — 85025 COMPLETE CBC W/AUTO DIFF WBC: CPT | Performed by: FAMILY MEDICINE

## 2017-12-16 LAB — VANCOMYCIN TROUGH SERPL-MCNC: 28.6 UG/ML (ref 10–20)

## 2017-12-18 ENCOUNTER — GENERIC CONVERSION - ENCOUNTER (OUTPATIENT)
Dept: OTHER | Facility: OTHER | Age: 47
End: 2017-12-18

## 2017-12-19 ENCOUNTER — TRANSCRIBE ORDERS (OUTPATIENT)
Dept: ADMINISTRATIVE | Facility: HOSPITAL | Age: 47
End: 2017-12-19

## 2017-12-19 ENCOUNTER — APPOINTMENT (OUTPATIENT)
Dept: LAB | Facility: HOSPITAL | Age: 47
End: 2017-12-19
Payer: COMMERCIAL

## 2017-12-19 DIAGNOSIS — Z79.899 ENCOUNTER FOR LONG-TERM (CURRENT) USE OF MEDICATIONS: Primary | ICD-10-CM

## 2017-12-19 DIAGNOSIS — Z79.899 ENCOUNTER FOR LONG-TERM (CURRENT) USE OF MEDICATIONS: ICD-10-CM

## 2017-12-19 LAB
ALBUMIN SERPL BCP-MCNC: 2.6 G/DL (ref 3.5–5)
ALP SERPL-CCNC: 320 U/L (ref 46–116)
ALT SERPL W P-5'-P-CCNC: 34 U/L (ref 12–78)
ANION GAP SERPL CALCULATED.3IONS-SCNC: 9 MMOL/L (ref 4–13)
AST SERPL W P-5'-P-CCNC: 33 U/L (ref 5–45)
BILIRUB SERPL-MCNC: 0.2 MG/DL (ref 0.2–1)
BUN SERPL-MCNC: 30 MG/DL (ref 5–25)
CALCIUM SERPL-MCNC: 6.8 MG/DL (ref 8.3–10.1)
CHLORIDE SERPL-SCNC: 109 MMOL/L (ref 100–108)
CO2 SERPL-SCNC: 19 MMOL/L (ref 21–32)
CREAT SERPL-MCNC: 1.58 MG/DL (ref 0.6–1.3)
CRP SERPL QL: 4.2 MG/L
ERYTHROCYTE [DISTWIDTH] IN BLOOD BY AUTOMATED COUNT: 18.6 % (ref 11.6–15.1)
ERYTHROCYTE [SEDIMENTATION RATE] IN BLOOD: 31 MM/HOUR (ref 0–10)
GFR SERPL CREATININE-BSD FRML MDRD: 51 ML/MIN/1.73SQ M
GLUCOSE SERPL-MCNC: 83 MG/DL (ref 65–140)
HCT VFR BLD AUTO: 26.6 % (ref 36.5–49.3)
HGB BLD-MCNC: 8 G/DL (ref 12–17)
MCH RBC QN AUTO: 28.4 PG (ref 26.8–34.3)
MCHC RBC AUTO-ENTMCNC: 30.1 G/DL (ref 31.4–37.4)
MCV RBC AUTO: 94 FL (ref 82–98)
PLATELET # BLD AUTO: 27 THOUSANDS/UL (ref 149–390)
PMV BLD AUTO: 8.2 FL (ref 8.9–12.7)
POTASSIUM SERPL-SCNC: 6.4 MMOL/L (ref 3.5–5.3)
PROT SERPL-MCNC: 5.8 G/DL (ref 6.4–8.2)
RBC # BLD AUTO: 2.82 MILLION/UL (ref 3.88–5.62)
SODIUM SERPL-SCNC: 137 MMOL/L (ref 136–145)
VANCOMYCIN TROUGH SERPL-MCNC: 23.8 UG/ML (ref 10–20)
WBC # BLD AUTO: 6 THOUSAND/UL (ref 4.31–10.16)

## 2017-12-19 PROCEDURE — 85027 COMPLETE CBC AUTOMATED: CPT

## 2017-12-19 PROCEDURE — 86140 C-REACTIVE PROTEIN: CPT

## 2017-12-19 PROCEDURE — 80053 COMPREHEN METABOLIC PANEL: CPT

## 2017-12-19 PROCEDURE — 36415 COLL VENOUS BLD VENIPUNCTURE: CPT

## 2017-12-19 PROCEDURE — 80202 ASSAY OF VANCOMYCIN: CPT

## 2017-12-19 PROCEDURE — 85652 RBC SED RATE AUTOMATED: CPT

## 2017-12-26 ENCOUNTER — APPOINTMENT (OUTPATIENT)
Dept: LAB | Facility: HOSPITAL | Age: 47
End: 2017-12-26
Payer: COMMERCIAL

## 2017-12-26 ENCOUNTER — TRANSCRIBE ORDERS (OUTPATIENT)
Dept: ADMINISTRATIVE | Facility: HOSPITAL | Age: 47
End: 2017-12-26

## 2017-12-26 DIAGNOSIS — Z79.899 ENCOUNTER FOR LONG-TERM (CURRENT) USE OF MEDICATIONS: Primary | ICD-10-CM

## 2017-12-26 DIAGNOSIS — Z79.899 ENCOUNTER FOR LONG-TERM (CURRENT) USE OF MEDICATIONS: ICD-10-CM

## 2017-12-26 LAB
ALBUMIN SERPL BCP-MCNC: 2.7 G/DL (ref 3.5–5)
ALP SERPL-CCNC: 380 U/L (ref 46–116)
ALT SERPL W P-5'-P-CCNC: 34 U/L (ref 12–78)
ANION GAP SERPL CALCULATED.3IONS-SCNC: 12 MMOL/L (ref 4–13)
AST SERPL W P-5'-P-CCNC: 36 U/L (ref 5–45)
BILIRUB SERPL-MCNC: 0.2 MG/DL (ref 0.2–1)
BUN SERPL-MCNC: 28 MG/DL (ref 5–25)
CALCIUM SERPL-MCNC: 6.4 MG/DL (ref 8.3–10.1)
CHLORIDE SERPL-SCNC: 108 MMOL/L (ref 100–108)
CO2 SERPL-SCNC: 18 MMOL/L (ref 21–32)
CREAT SERPL-MCNC: 1.66 MG/DL (ref 0.6–1.3)
ERYTHROCYTE [DISTWIDTH] IN BLOOD BY AUTOMATED COUNT: 17 % (ref 11.6–15.1)
ERYTHROCYTE [SEDIMENTATION RATE] IN BLOOD: 29 MM/HOUR (ref 0–10)
GFR SERPL CREATININE-BSD FRML MDRD: 48 ML/MIN/1.73SQ M
GLUCOSE SERPL-MCNC: 182 MG/DL (ref 65–140)
HCT VFR BLD AUTO: 28.3 % (ref 36.5–49.3)
HGB BLD-MCNC: 8.5 G/DL (ref 12–17)
MCH RBC QN AUTO: 28.3 PG (ref 26.8–34.3)
MCHC RBC AUTO-ENTMCNC: 30 G/DL (ref 31.4–37.4)
MCV RBC AUTO: 94 FL (ref 82–98)
PLATELET # BLD AUTO: 22 THOUSANDS/UL (ref 149–390)
PMV BLD AUTO: 10.9 FL (ref 8.9–12.7)
POTASSIUM SERPL-SCNC: 4.8 MMOL/L (ref 3.5–5.3)
PROT SERPL-MCNC: 5.9 G/DL (ref 6.4–8.2)
RBC # BLD AUTO: 3 MILLION/UL (ref 3.88–5.62)
SODIUM SERPL-SCNC: 138 MMOL/L (ref 136–145)
VANCOMYCIN TROUGH SERPL-MCNC: 17.6 UG/ML (ref 10–20)
WBC # BLD AUTO: 6.66 THOUSAND/UL (ref 4.31–10.16)

## 2017-12-26 PROCEDURE — 36415 COLL VENOUS BLD VENIPUNCTURE: CPT

## 2017-12-26 PROCEDURE — 80202 ASSAY OF VANCOMYCIN: CPT

## 2017-12-26 PROCEDURE — 86140 C-REACTIVE PROTEIN: CPT

## 2017-12-26 PROCEDURE — 85652 RBC SED RATE AUTOMATED: CPT

## 2017-12-26 PROCEDURE — 80053 COMPREHEN METABOLIC PANEL: CPT

## 2017-12-26 PROCEDURE — 85027 COMPLETE CBC AUTOMATED: CPT

## 2017-12-27 LAB — CRP SERPL QL: 3.7 MG/L

## 2018-01-02 ENCOUNTER — APPOINTMENT (OUTPATIENT)
Dept: LAB | Facility: HOSPITAL | Age: 48
End: 2018-01-02
Payer: COMMERCIAL

## 2018-01-02 DIAGNOSIS — Z79.899 ENCOUNTER FOR LONG-TERM (CURRENT) USE OF MEDICATIONS: ICD-10-CM

## 2018-01-02 LAB
ALBUMIN SERPL BCP-MCNC: 2.7 G/DL (ref 3.5–5)
ALP SERPL-CCNC: 435 U/L (ref 46–116)
ALT SERPL W P-5'-P-CCNC: 31 U/L (ref 12–78)
ANION GAP SERPL CALCULATED.3IONS-SCNC: 11 MMOL/L (ref 4–13)
AST SERPL W P-5'-P-CCNC: 26 U/L (ref 5–45)
BILIRUB SERPL-MCNC: 0.2 MG/DL (ref 0.2–1)
BUN SERPL-MCNC: 27 MG/DL (ref 5–25)
CALCIUM SERPL-MCNC: 7.2 MG/DL (ref 8.3–10.1)
CHLORIDE SERPL-SCNC: 111 MMOL/L (ref 100–108)
CO2 SERPL-SCNC: 21 MMOL/L (ref 21–32)
CREAT SERPL-MCNC: 1.64 MG/DL (ref 0.6–1.3)
CRP SERPL QL: <3 MG/L
ERYTHROCYTE [DISTWIDTH] IN BLOOD BY AUTOMATED COUNT: 15.9 % (ref 11.6–15.1)
ERYTHROCYTE [SEDIMENTATION RATE] IN BLOOD: 22 MM/HOUR (ref 0–10)
GFR SERPL CREATININE-BSD FRML MDRD: 49 ML/MIN/1.73SQ M
GLUCOSE SERPL-MCNC: 119 MG/DL (ref 65–140)
HCT VFR BLD AUTO: 27.6 % (ref 36.5–49.3)
HGB BLD-MCNC: 8.5 G/DL (ref 12–17)
MCH RBC QN AUTO: 28.5 PG (ref 26.8–34.3)
MCHC RBC AUTO-ENTMCNC: 30.8 G/DL (ref 31.4–37.4)
MCV RBC AUTO: 93 FL (ref 82–98)
PLATELET # BLD AUTO: 35 THOUSANDS/UL (ref 149–390)
PMV BLD AUTO: 8.4 FL (ref 8.9–12.7)
POTASSIUM SERPL-SCNC: 5.2 MMOL/L (ref 3.5–5.3)
PROT SERPL-MCNC: 5.6 G/DL (ref 6.4–8.2)
RBC # BLD AUTO: 2.98 MILLION/UL (ref 3.88–5.62)
SODIUM SERPL-SCNC: 143 MMOL/L (ref 136–145)
VANCOMYCIN TROUGH SERPL-MCNC: 15.3 UG/ML (ref 10–20)
WBC # BLD AUTO: 4.98 THOUSAND/UL (ref 4.31–10.16)

## 2018-01-02 PROCEDURE — 80202 ASSAY OF VANCOMYCIN: CPT

## 2018-01-02 PROCEDURE — 85027 COMPLETE CBC AUTOMATED: CPT

## 2018-01-02 PROCEDURE — 85652 RBC SED RATE AUTOMATED: CPT

## 2018-01-02 PROCEDURE — 36415 COLL VENOUS BLD VENIPUNCTURE: CPT

## 2018-01-02 PROCEDURE — 86140 C-REACTIVE PROTEIN: CPT

## 2018-01-02 PROCEDURE — 80053 COMPREHEN METABOLIC PANEL: CPT

## 2018-01-09 ENCOUNTER — APPOINTMENT (OUTPATIENT)
Dept: LAB | Facility: HOSPITAL | Age: 48
End: 2018-01-09
Payer: COMMERCIAL

## 2018-01-09 DIAGNOSIS — Z79.899 ENCOUNTER FOR LONG-TERM (CURRENT) USE OF MEDICATIONS: ICD-10-CM

## 2018-01-09 LAB
ALBUMIN SERPL BCP-MCNC: 2.9 G/DL (ref 3.5–5)
ALP SERPL-CCNC: 459 U/L (ref 46–116)
ALT SERPL W P-5'-P-CCNC: 36 U/L (ref 12–78)
ANION GAP SERPL CALCULATED.3IONS-SCNC: 11 MMOL/L (ref 4–13)
AST SERPL W P-5'-P-CCNC: 31 U/L (ref 5–45)
BILIRUB SERPL-MCNC: 0.2 MG/DL (ref 0.2–1)
BUN SERPL-MCNC: 28 MG/DL (ref 5–25)
CALCIUM SERPL-MCNC: 7.3 MG/DL (ref 8.3–10.1)
CHLORIDE SERPL-SCNC: 108 MMOL/L (ref 100–108)
CO2 SERPL-SCNC: 21 MMOL/L (ref 21–32)
CREAT SERPL-MCNC: 1.8 MG/DL (ref 0.6–1.3)
CRP SERPL QL: 7.5 MG/L
ERYTHROCYTE [DISTWIDTH] IN BLOOD BY AUTOMATED COUNT: 15.1 % (ref 11.6–15.1)
ERYTHROCYTE [SEDIMENTATION RATE] IN BLOOD: 25 MM/HOUR (ref 0–10)
GFR SERPL CREATININE-BSD FRML MDRD: 44 ML/MIN/1.73SQ M
GLUCOSE SERPL-MCNC: 166 MG/DL (ref 65–140)
HCT VFR BLD AUTO: 28.5 % (ref 36.5–49.3)
HGB BLD-MCNC: 9 G/DL (ref 12–17)
MCH RBC QN AUTO: 28.6 PG (ref 26.8–34.3)
MCHC RBC AUTO-ENTMCNC: 31.6 G/DL (ref 31.4–37.4)
MCV RBC AUTO: 91 FL (ref 82–98)
PLATELET # BLD AUTO: 37 THOUSANDS/UL (ref 149–390)
PMV BLD AUTO: 8 FL (ref 8.9–12.7)
POTASSIUM SERPL-SCNC: 5 MMOL/L (ref 3.5–5.3)
PROT SERPL-MCNC: 5.9 G/DL (ref 6.4–8.2)
RBC # BLD AUTO: 3.15 MILLION/UL (ref 3.88–5.62)
SODIUM SERPL-SCNC: 140 MMOL/L (ref 136–145)
VANCOMYCIN TROUGH SERPL-MCNC: 15.8 UG/ML (ref 10–20)
WBC # BLD AUTO: 5.19 THOUSAND/UL (ref 4.31–10.16)

## 2018-01-09 PROCEDURE — 36415 COLL VENOUS BLD VENIPUNCTURE: CPT

## 2018-01-09 PROCEDURE — 80202 ASSAY OF VANCOMYCIN: CPT

## 2018-01-09 PROCEDURE — 86140 C-REACTIVE PROTEIN: CPT

## 2018-01-09 PROCEDURE — 85652 RBC SED RATE AUTOMATED: CPT

## 2018-01-09 PROCEDURE — 85027 COMPLETE CBC AUTOMATED: CPT

## 2018-01-09 PROCEDURE — 80053 COMPREHEN METABOLIC PANEL: CPT

## 2018-01-09 NOTE — RESULT NOTES
Verified Results  (1) BASIC METABOLIC PROFILE [id number] 07:25AM Zeny Banuelos   TW Order Number: [id number]     Test Name Result Flag Reference   SODIUM 135 mmol/L L 136-145   POTASSIUM 3 6 mmol/L  3 5-5 3   CHLORIDE 102 mmol/L  100-108   CARBON DIOXIDE 15 mmol/L L 21-32   ANION GAP (CALC) 18 mmol/L H 4-13   BLOOD UREA NITROGEN 54 mg/dL H 5-25   CREATININE 2 29 mg/dL H 0 60-1 30   Standardized to IDMS reference method   CALCIUM 6 6 mg/dL L 8 3-10 1   eGFR 33 ml/min/1 73sq m     National Kidney Disease Education Program recommendations are as follows:  GFR calculation is accurate only with a steady state creatinine  Chronic Kidney disease less than 60 ml/min/1 73 sq  meters  Kidney failure less than 15 ml/min/1 73 sq  meters  GLUCOSE FASTING 110 mg/dL H 65-99   Specimen collection should occur prior to Sulfasalazine administration due to the potential for falsely depressed results  Specimen collection should occur prior to Sulfapyridine administration due to the potential for falsely elevated results       (1) MAGNESIUM [id number] 07:25AM Deejay Gomez Order Number: [id number]     Test Name Result Flag Reference   MAGNESIUM 1 4 mg/dL L 1 6-2 6

## 2018-01-10 NOTE — RESULT NOTES
Verified Results  (1) BASIC METABOLIC PROFILE [id number] 07:10AM Arch Emperor     Test Name Result Flag Reference   SODIUM 142 mmol/L  136-145   POTASSIUM 4 2 mmol/L  3 5-5 3   CHLORIDE 111 mmol/L H 100-108   CARBON DIOXIDE 15 mmol/L L 21-32   ANION GAP (CALC) 16 mmol/L H 4-13   BLOOD UREA NITROGEN 28 mg/dL H 5-25   CREATININE 1 79 mg/dL H 0 60-1 30   Standardized to IDMS reference method   CALCIUM 7 8 mg/dL L 8 3-10 1   eGFR 44 ml/min/1 73sq m     National Kidney Disease Education Program recommendations are as follows:  GFR calculation is accurate only with a steady state creatinine  Chronic Kidney disease less than 60 ml/min/1 73 sq  meters  Kidney failure less than 15 ml/min/1 73 sq  meters  GLUCOSE FASTING 40 mg/dL L 65-99     (1) HEMOGLOBIN A1C [id number] 07:10AM Arch Emperor     Test Name Result Flag Reference   HEMOGLOBIN A1C 5 8 %  4 2-6 3   EST  AVG  GLUCOSE 120 mg/dl       (1) LIPID PANEL, FASTING [id number] 07:10AM Arch Emperor     Test Name Result Flag Reference   CHOLESTEROL 72 mg/dL     HDL,DIRECT 27 mg/dL L 40-60   Specimen collection should occur prior to Metamizole administration due to the potential for falsely depressed results  LDL CHOLESTEROL CALCULATED 31 mg/dL  0-100   Triglyceride:         Normal              <150 mg/dl       Borderline High    150-199 mg/dl       High               200-499 mg/dl       Very High          >499 mg/dl  Cholesterol:         Desirable        <200 mg/dl      Borderline High  200-239 mg/dl      High             >239 mg/dl  HDL Cholesterol:        High    >59 mg/dL      Low     <41 mg/dL  LDL CALCULATED:    This screening LDL is a calculated result  It does not have the accuracy of the Direct Measured LDL in the monitoring of patients with hyperlipidemia and/or statin therapy  Direct Measure LDL (IUY095) must be ordered separately in these patients     TRIGLYCERIDES 68 mg/dL  <=150   Specimen collection should occur prior to N-Acetylcysteine or Metamizole administration due to the potential for falsely depressed results  (1) MAGNESIUM [id number] 07:10AM Jerre All     Test Name Result Flag Reference   MAGNESIUM 1 8 mg/dL  1 6-2 6     (1) PHOSPHORUS 13Gcx7249 07:10AM Jerre All     Test Name Result Flag Reference   PHOSPHORUS 4 6 mg/dL H 2 7-4 5     (1) VITAMIN D 25-HYDROXY 46Aot5582 07:10AM Jerre All     Test Name Result Flag Reference   VIT D 25-HYDROX 7 9 ng/mL L 30 0-100 0   This assay is a certified procedure of the CDC Vitamin D Standardization Certification Program (VDSCP)     Deficiency <20ng/ml   Insufficiency 20-30ng/ml   Sufficient  ng/ml     *Patients undergoing fluorescein dye angiography may retain small amounts of fluorescein in the body for 48-72 hours post procedure  Samples containing fluorescein can produce falsely elevated Vitamin D values  If the patient had this procedure, a specimen should be resubmitted post fluorescein clearance  (1) LACTIC ACID 28Wbt3005 07:10AM Jerre All     Test Name Result Flag Reference   LACTIC ACID 0 5 mmol/L  0 5-2 0   Result may be elevated if tourniquet was used during collection       (1) HEP C ANTIBODY 53Bul8535 07:10AM Jerre All     Test Name Result Flag Reference   HEPATITIS C ANTIBODY Non-reactive  Non-reactive

## 2018-01-11 NOTE — RESULT NOTES
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, unchanged  Oblique fracture through the posterior aspect of the calcaneus, new  Old fractures of the 2nd through 5th metatarsals are noted  The bones are diffusely demineralized  No lytic or blastic lesions are seen  Soft tissues are unremarkable  IMPRESSION:     Acute fractures of the distal tibia, distal fibula and posterior calcaneus  Subacute fracture, distal 1st metatarsal      Chronic fracture deformities, 2nd through 5th metatarsals  ##imslh##imslh       Workstation performed: [id number]     Signed by:   Dorinda Herrera MD   6/27/17     * XR FOOT 3+ VIEW RIGHT [id number] 01:47PM [email or web address] Order Number: [id number]     Test Name Result Flag Reference   XR FOOT 3+ VW RIGHT (Report)     RIGHT FOOT     INDICATION: Right foot pain  COMPARISON: December 6, 2016     VIEWS: AP, lateral and oblique      IMAGES: 3     FINDINGS:     Acute nondisplaced transverse fractures of the distal tibial metaphysis and distal fibular metaphysis noted  Fracture deformity of the head of the 1st metatarsal is noted, subacute  Amputation of the 1st toe, unchanged  Oblique fracture through the posterior aspect of the calcaneus, new  Old fractures of the 2nd through 5th metatarsals are noted  The bones are diffusely demineralized       No lytic or blastic lesions are seen      Soft tissues are unremarkable  IMPRESSION:     Acute fractures of the distal tibia, distal fibula and posterior calcaneus  Subacute fracture, distal 1st metatarsal      Chronic fracture deformities, 2nd through 5th metatarsals  ##imslh##imslh       Workstation performed: [id number]     Signed by:   Katherne Lesch Raynard Farrow, MD   6/27/17                               Plan  Acute foot pain, right, Acute right ankle pain    · 2 - Lex CRONIN, Tamara Byers  (Orthopedic Surgery) Co-Management  *patient has a fractured  tibia and fibia  Status: Active  Requested for: 27Jun2017  Care Summary provided  : Yes

## 2018-01-12 VITALS — HEART RATE: 88 BPM | DIASTOLIC BLOOD PRESSURE: 75 MMHG | SYSTOLIC BLOOD PRESSURE: 157 MMHG

## 2018-01-12 VITALS — HEART RATE: 97 BPM | DIASTOLIC BLOOD PRESSURE: 81 MMHG | SYSTOLIC BLOOD PRESSURE: 129 MMHG

## 2018-01-12 VITALS — SYSTOLIC BLOOD PRESSURE: 114 MMHG | DIASTOLIC BLOOD PRESSURE: 70 MMHG | HEART RATE: 80 BPM

## 2018-01-12 VITALS — HEART RATE: 81 BPM | SYSTOLIC BLOOD PRESSURE: 126 MMHG | DIASTOLIC BLOOD PRESSURE: 76 MMHG

## 2018-01-12 NOTE — MISCELLANEOUS
July 17,2017    To whom it may concern: This is a request for my patient to be able to park in front of the hospital due a right ankle fracture, until further notice  If you should have any questions regarding  this diagnosis, please feel free to contact me at the office      Sincerely,        Randell Anderson DO  [phone]              Electronically signed by:Dalton Tristan DO  Jul 17 2017  4:13PM EST

## 2018-01-12 NOTE — RESULT NOTES
Verified Results  (1) APTT 30Jun2017 12:17PM Susie Dull   TW Order Number: [id number]     Test Name Result Flag Reference   PARTIAL THROMBOPLASTIN TIME 53 seconds H 23-35   Therapeutic Heparin Range = 60-90 seconds     (1) BASIC METABOLIC PROFILE [id number] 12:17PM Susie Dull    Order Number: [id number]     Test Name Result Flag Reference   GLUCOSE,RANDM 82 mg/dL     If the patient is fasting, the ADA then defines impaired fasting glucose as > 100 mg/dL and diabetes as > or equal to 123 mg/dL  SODIUM 143 mmol/L  136-145   POTASSIUM 4 3 mmol/L  3 5-5 3   CHLORIDE 114 mmol/L H 100-108   CARBON DIOXIDE 13 mmol/L L 21-32   ANION GAP (CALC) 16 mmol/L H 4-13   BLOOD UREA NITROGEN 33 mg/dL H 5-25   CREATININE 1 94 mg/dL H 0 60-1 30   Standardized to IDMS reference method   CALCIUM 7 7 mg/dL L 8 3-10 1   eGFR Non-African American 37 3 ml/min/1 73sq [facility] Disease Education Program recommendations are as follows:  GFR calculation is accurate only with a steady state creatinine  Chronic Kidney disease less than 60 ml/min/1 73 sq  meters  Kidney failure less than 15 ml/min/1 73 sq  meters  (1) CBC/PLT/DIFF [id number] 12:17PM Susie Dull   TW Order Number: [id number]     Test Name Result Flag Reference   WBC COUNT 8 07 Thousand/uL  4 31-10 16   RBC COUNT 3 54 Million/uL L 3 88-5 62   HEMOGLOBIN 9 5 g/dL L 12 0-17 0   HEMATOCRIT 30 7 % L 36 5-49 3   MCV 87 fL  82-98   MCH 26 8 pg  26 8-34 3   MCHC 30 9 g/dL L 31 4-37 4   RDW 16 0 % H 11 6-15 1   MPV 9 1 fL  8 9-12 7   PLATELET COUNT 014 Thousands/uL H 149-390   NEUTROPHILS RELATIVE PERCENT 71 %  43-75   LYMPHOCYTES RELATIVE PERCENT 14 %  14-44   MONOCYTES RELATIVE PERCENT 14 % H 4-12   EOSINOPHILS RELATIVE PERCENT 1 %  0-6   BASOPHILS RELATIVE PERCENT 0 %  0-1   NEUTROPHILS ABSOLUTE COUNT 5 69 Thousands/? ??L  1 85-7 62   LYMPHOCYTES ABSOLUTE COUNT 1 11 Thousands/? ??L  0 60-4 47   MONOCYTES ABSOLUTE COUNT 1 14 Thousand/? ??L  0 17-1 22   EOSINOPHILS ABSOLUTE COUNT 0 11 Thousand/? ??L  0 00-0 61   BASOPHILS ABSOLUTE COUNT 0 02 Thousands/? ??L  0 00-0 10     (1) HEMOGLOBIN A1C 30Jun2017 12:17PM Dada RUSHING Order Number: [id number]     Test Name Result Flag Reference   HEMOGLOBIN A1C 6 4 % H 4 2-6 3   EST  AVG   GLUCOSE 137 mg/dl

## 2018-01-13 VITALS — HEART RATE: 86 BPM | DIASTOLIC BLOOD PRESSURE: 79 MMHG | SYSTOLIC BLOOD PRESSURE: 154 MMHG

## 2018-01-13 VITALS — HEART RATE: 82 BPM | DIASTOLIC BLOOD PRESSURE: 71 MMHG | SYSTOLIC BLOOD PRESSURE: 126 MMHG

## 2018-01-13 VITALS
SYSTOLIC BLOOD PRESSURE: 112 MMHG | WEIGHT: 112 LBS | BODY MASS INDEX: 19.12 KG/M2 | DIASTOLIC BLOOD PRESSURE: 54 MMHG | HEIGHT: 64 IN

## 2018-01-13 NOTE — RESULT NOTES
Verified Results  (1) BASIC METABOLIC PROFILE [id number] 11:30AM Lalo Art    Order Number: [id number]     Test Name Result Flag Reference   GLUCOSE,RANDM 201 mg/dL H    If the patient is fasting, the ADA then defines impaired fasting glucose as > 100 mg/dL and diabetes as > or equal to 123 mg/dL  SODIUM 136 mmol/L  136-145   POTASSIUM 4 4 mmol/L  3 5-5 3   CHLORIDE 109 mmol/L H 100-108   CARBON DIOXIDE 15 mmol/L L 21-32   ANION GAP (CALC) 12 mmol/L  4-13   BLOOD UREA NITROGEN 51 mg/dL H 5-25   CREATININE 2 01 mg/dL H 0 60-1 30   Standardized to IDMS reference method   CALCIUM 8 0 mg/dL L 8 3-10 1   eGFR Non-African American 35 8 ml/min/1 73sq m     [facility] Disease Education Program recommendations are as follows:  GFR calculation is accurate only with a steady state creatinine  Chronic Kidney disease less than 60 ml/min/1 73 sq  meters  Kidney failure less than 15 ml/min/1 73 sq  meters

## 2018-01-13 NOTE — RESULT NOTES
Verified Results  (1) HEMOGLOBIN A1C [id number] 08:25AM Ryan Chew   TW Order Number: [id number]     Test Name Result Flag Reference   HEMOGLOBIN A1C 7 1 % H 4 2-6 3   EST  AVG  GLUCOSE 157 mg/dl     HEMOGLOBIN A1C 7 1 % H 4 2-6 3   EST  AVG  GLUCOSE 157 mg/dl                   (1) BASIC METABOLIC PROFILE [id number] 08:25AM Ryan Chew   TW Order Number: [id number]     Test Name Result Flag Reference   GLUCOSE,RANDM 241 mg/dL H    If the patient is fasting, the ADA then defines impaired fasting glucose as > 100 mg/dL and diabetes as > or equal to 123 mg/dL  SODIUM 135 mmol/L L 136-145   POTASSIUM 5 4 mmol/L H 3 5-5 3   CHLORIDE 108 mmol/L  100-108   CARBON DIOXIDE 13 mmol/L L 21-32   ANION GAP (CALC) 14 mmol/L H 4-13   BLOOD UREA NITROGEN 51 mg/dL H 5-25   CREATININE 2 02 mg/dL H 0 60-1 30   Standardized to IDMS reference method   CALCIUM 8 3 mg/dL  8 3-10 1   eGFR Non-African American 35 6 ml/min/1 73sq [facility] [employer] Disease Education Program recommendations are as follows:  GFR calculation is accurate only with a steady state creatinine  Chronic Kidney disease less than 60 ml/min/1 73 sq  meters  Kidney failure less than 15 ml/min/1 73 sq  meters

## 2018-01-14 VITALS — DIASTOLIC BLOOD PRESSURE: 77 MMHG | SYSTOLIC BLOOD PRESSURE: 135 MMHG | HEART RATE: 80 BPM

## 2018-01-15 NOTE — MISCELLANEOUS
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 (733 82) (H65 571M)    Discussion/Summary  Discussion Summary:   Patient's medication list was reconciled  We discussed his nonhealing right foot fracture  He would like to get a second opinion to see if he is a candidate for bone stimulation  He has a lot of contacts at panel where he is a well-known patient to them and I think that would be a good choice for orthopedics  Also, his atenolol as been discontinued these would started metoprolol so far  Sugars are in the 140s and we will see him again in about 2-3 months  History of Present Illness  TCM Communication [facility]: The patient is being contacted for MADELIN 10-3-17  He was hospitalized at [facility] and 9-23-17 TO 9-25-17  The dates of hospitalization: Sofiya Gilbert, date of admission: 9-25-17, date of discharge: 9-30-17  Diagnosis: SEPTIC,SHOCK, CKD, ARF, DKA, META ACIDOSIS, RESP FAILURE,HTN  He was discharged to home  Medications were not reviewed today  Follow-up appointments with other specialists: TRANSPLANT MD [facility]  Symptoms: cough  Communication performed and completed by Kiersten Hatch   HPI: PT HAD NAUSEA AND VOMITING WITH HYPERGLYCEMIA IN  RANGE  PT HAD TAKEN CEPHALEXIN FOR A R FOOT INFECTION  UPON ADMISSION PT WAS IN DKA AND CRF AND ADMITTED TO ICU  PT WAS TREATED WITH VANCO FOR SEPTIC SHOCK SECONDARY TO OSTEOMYELITIS  PT HAD LABORED BREATHING AND WAS INTUBATED WITH A SUBCLAVIAN LINE   PT WAS FOUND WITH RHINOVIRUS INSTEAD OF OSTEO   PT HAD CONSULT WITH INFECTIOUS DISEASE AND INSULIN AND ANTIBIOTICS WERE ADJUSTED  Review of Systems  Complete-Male:   Constitutional: No fever or chills, feels well, no tiredness, no recent weight gain or weight loss     Eyes: No complaints of eye pain, no red eyes, no discharge from eyes, no itchy eyes  ENT: no complaints of earache, no hearing loss, no nosebleeds, no nasal discharge, no sore throat, no hoarseness  Cardiovascular: No complaints of slow heart rate, no fast heart rate, no chest pain, no palpitations, no leg claudication, no lower extremity  Respiratory: No complaints of shortness of breath, no wheezing, no cough, no SOB on exertion, no orthopnea or PND  Gastrointestinal: No complaints of abdominal pain, no constipation, no nausea or vomiting, no diarrhea or bloody stools  Genitourinary: No complaints of dysuria, no incontinence, no hesitancy, no nocturia, no genital lesion, no testicular pain  Musculoskeletal: as noted in HPI  Integumentary: No complaints of skin rash or skin lesions, no itching, no skin wound, no dry skin  Neurological: No compliants of headache, no confusion, no convulsions, no numbness or tingling, no dizziness or fainting, no limb weakness, no difficulty walking  ROS Reviewed:   ROS reviewed  Active Problems    1  Acute foot pain, right (729 5) (M79 671)   2  Acute kidney injury (nontraumatic) (584 9) (N17 9)   3  Acute right ankle pain (719 47,338 19) (M25 571)   4  Cellulitis of ankle (682 6) (L03 119)   5  Chronic kidney disease, stage IV (severe) (585 4) (N18 4)   6  Chronic multifocal osteomyelitis of right tibia (730 16) (M86 361)   7  Closed fracture of right calcaneus with routine healing, unspecified portion of calcaneus,   subsequent encounter   8  Closed fracture of shaft of right tibia with routine healing (V54 16) (S82 201D)   9  Disseminated Herpes Zoster (053 8)   10  Extracapsular Cataract Extract W/ Insert Intraocular Lens Prosthesis   11  Failed hardware   12  Hypertension (401 9) (I10)   13  Metabolic acidosis (219 3) (E87 2)   14  Need for hepatitis C screening test (V73 89) (Z11 59)   15  Other acute osteomyelitis of right tibia (730 06) (M86 161)   16   Screening for depression (V79 0) (Z13 89)   17  Screening for diabetic peripheral neuropathy (V80 09) (Z13 89)   18  Status post ORIF of fracture of ankle (V45 89,V15 51) (Z96 7,Z87 81)   19  Type 1 diabetes mellitus without complication (293 89) (M37 1)   20  Vitamin D deficiency (268 9) (E55 9)    Past Medical History    1  History of Closed nondisplaced fracture of first metatarsal bone of right foot with routine   healing (V54 19) (S92 314D)   2  Common cold (460) (J00)   3  History of common cold (V12 09) (Z86 19)   4  History of pneumonia (V12 61) (Z87 01)   5  History of Sepsis, due to unspecified organism (038 9,995 91) (A41 9)   6  History of Type 1 diabetes mellitus, uncontrolled (250 03) (E10 65)   7  History of Urinary tract infection without hematuria, site unspecified (599 0) (N39 0)    Surgical History    1  Extracapsular Cataract Extract W/ Insert Intraocular Lens Prosthesis   2  History of Eye Surgery   3  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   4  History of Renal Transplant  Surgical History Reviewed: The surgical history was reviewed and updated today  Family History  Mother    1  Family history of Coronary artery disease (414 00) (I25 10)  Brother    2  Family history of Type 1 Diabetes Mellitus   3  Family history of Type 1 Diabetes Mellitus   4  Family history of Type 1 Diabetes Mellitus  Family History Reviewed: The family history was reviewed and updated today  Social History    · Being A Social Drinker   · Denied: History of Drug Use   · Never a smoker   · No living will  Social History Reviewed: The social history was reviewed and updated today  The social history was reviewed and is unchanged  Current Meds   1  Ruth Ann Contour Test In [employer]; check BS 5-6x/day; Therapy: 78UCB6605 to (Evaluate:71Ine3028)  Requested for: 90GFO1819; Last   Rx:16Jan2017 Ordered   2  BD Insulin Syr Ultrafine II 31G X 5/16" 0 5 ML Miscellaneous; use as directed;    Therapy: 51HXF2214 to (Aura Breanna)  Requested for: 09Iff3942; Last   Rx:37Bjs1244 Ordered   3  CellCept 250 MG Oral Capsule; TAKE 3 CAPSULES TWICE DAILY Recorded   4  Cephalexin 250 MG Oral Capsule; TAKE 1 CAPSULE 3 TIMES DAILY; Therapy: 11Gei1557 to (Evaluate:36Cod0687)  Requested for: 31Qlb0755; Last   Rx:90Xix0658 Ordered   5  CVS Daily Multiple Oral Tablet; Take 1 tablet daily; Therapy: (Recorded:56Jvb1869) to Recorded   6  HumaLOG 100 UNIT/ML Subcutaneous Solution; INJECT UP TO 30 UNITS   SUBCUTANEOUSLY PER DAY AS DIRECTED (PLEASE CALLOFFICE TO SCHEDULE   APPT); Therapy: 57NBB7471 to (Last Rx:18Apr2017)  Requested for: 56Vlm5581 Ordered   7  Levemir 100 UNIT/ML Subcutaneous Solution; INJECT 15 UNITS SUBCUTANEOUSLY 2   TIMES A DAY; Therapy: 85FYK1762 to (Last Rx:14Jun2017)  Requested for: 06TYL1840 Ordered   8  Metoprolol Tartrate 25 MG Oral Tablet; TAKE 0 5 TABLET Twice daily; Therapy: [phone]) to Recorded   9  OneTouch Ultra Blue In Vitro Strip; CHECK BLOOD SUGAR 5 TO 6 TIMES DAILY; Therapy: 34FJT1169 to (Last Rx:80Bsv0328)  Requested for: 20Qvp9775 Ordered   10  PredniSONE 5 MG Oral Tablet; Take 1 tablet daily  Requested for: 59PNF8655; Last    Rx:69Sxj4754 Ordered   11  Prograf 0 5 MG Oral Capsule; 1 5tablets twice per day Recorded   12  Sodium Bicarbonate 650 MG Oral Tablet; TAKE 1 TABLET TWICE DAILY WITH MEALS; Therapy: [phone]) to Recorded  Medication List Reviewed: The medication list was reviewed and updated today  Allergies    1  No Known Drug Allergies    Vitals  Signs   Recorded: 41IGE6575 01:16PM   Temperature: 27 2 F  Systolic: 049  Diastolic: 74  Height: 5 ft 4 in  Weight: 112 lb   BMI Calculated: 19 22  BSA Calculated: 1 53    Physical Exam    Constitutional   General appearance: No acute distress, well appearing and well nourished  Eyes   Conjunctiva and lids: No swelling, erythema, or discharge      Pupils and irises: Equal, round and reactive to light     Ears, Nose, Mouth, and Throat   External inspection of ears and nose: Normal     Otoscopic examination: Tympanic membrance translucent with normal light reflex  Canals patent without erythema  Nasal mucosa, septum, and turbinates: Normal without edema or erythema  Oropharynx: Normal with no erythema, edema, exudate or lesions  Pulmonary   Respiratory effort: No increased work of breathing or signs of respiratory distress  Auscultation of lungs: Clear to auscultation, equal breath sounds bilaterally, no wheezes, no rales, no rhonci  Cardiovascular   Palpation of heart: Normal PMI, no thrills  Auscultation of heart: Normal rate and rhythm, normal S1 and S2, without murmurs  Examination of extremities for edema and/or varicosities: Normal     Carotid pulses: Normal     Abdomen   Abdomen: Non-tender, no masses  Liver and spleen: No hepatomegaly or splenomegaly  Lymphatic   Palpation of lymph nodes in neck: No lymphadenopathy  Musculoskeletal   Gait and station: Normal     Digits and nails: Normal without clubbing or cyanosis  Inspection/palpation of joints, bones, and muscles: Normal     Skin   Skin and subcutaneous tissue: Normal without rashes or lesions  Neurologic   Cranial nerves: Cranial nerves 2-12 intact  Reflexes: 2+ and symmetric  Sensation: No sensory loss      Psychiatric   Orientation to person, place and time: Normal     Mood and affect: Normal          Future Appointments    Date/Time Provider Specialty Site   10/03/2017 01:00 PM Hudson De La Vega DO Family Medicine [address] [place] [address]     Signatures   Electronically signed by : Ofelia Silvestre, ; Oct  2 2017  2:34PM EST                       (Author)    Electronically signed by : Rodrigue Bobby DO; Oct  3 2017  1:49PM EST                       (Author)

## 2018-01-15 NOTE — PROGRESS NOTES
Assessment    1  Chronic kidney disease, stage IV (severe) (585 4) (N18 4)   2  Hypertension (401 9) (I10)    Plan  Hypertension    · (1) LIPID PANEL, FASTING; Status:Active; Requested for:00Zhv7459;   Hypertension, Type 1 diabetes mellitus without complication    · (1) HEMOGLOBIN A1C; Status:Active; Requested for:96Gsp9692;     Discussion/Summary  Impression: health maintenance visit  Currently, he eats a healthy diet  Prostate cancer screening: the risks and benefits of prostate cancer screening were discussed and PSA was ordered  Testicular cancer screening: the risks and benefits of testicular cancer screening were discussed  Colorectal cancer screening: the risks and benefits of colorectal cancer screening were discussed  History of Present Illness  HM, Adult Male: The patient is being seen for a health maintenance evaluation  The last health maintenance visit was 1 year(s) ago  General Health: The patient's health since the last visit is described as good  He has regular dental visits  He complains of vision problems  He denies hearing loss  Lifestyle:  He consumes a diverse and healthy diet  He does not have any weight concerns  He exercises regularly  He does not use tobacco  He consumes alcohol  He denies drug use  Reproductive health:  the patient is sexually active  birth control is being practiced  He denies erectile dysfunction  Screening: cancer screening reviewed and current  Prostate cancer screening includes no previous evaluation  Testicular cancer screening includes no previous evaluation  Colorectal cancer screening includes no previous screening  metabolic screening reviewed and current  Metabolic screening includes lipid profile performed within the past five years, glucose screening performed last year and thyroid function test performed last year  risk screening reviewed and current     Cardiovascular risk factors: hypertension and diabetes, but no high LDL cholesterol, no low HDL cholesterol, no stress, no obesity, no tobacco use, no illicit drug use, no sedentary lifestyle and no family history of cardiovascular disease  General health risks: no elevated prostate-specific antigen, no undescended testis, no family history of prostate cancer, no previous colon polyps, no inflammatory bowel disease, no osteoporosis risk factors, no asbestos exposure, no radiation exposure and no occupational exposure to  Safety elements used: seat belt, bicycle helmet, safe driving habits, sunscreen, smoke detector, carbon monoxide detector, hot water temperature less than 120 degrees F, bathroom grab bars and fall prevention measures, but no motorcycle helmet, no gun trigger locks, no gun safe, no CPR training for the patient and no CPR training for household members  HPI: Patient presents today for the express purposes of his health maintenance exam      Review of Systems    Constitutional: No fever or chills, feels well, no tiredness, no recent weight gain or weight loss  Eyes: No complaints of eye pain, no red eyes, no discharge from eyes, no itchy eyes  ENT: no complaints of earache, no hearing loss, no nosebleeds, no nasal discharge, no sore throat, no hoarseness  Cardiovascular: No complaints of slow heart rate, no fast heart rate, no chest pain, no palpitations, no leg claudication, no lower extremity  Respiratory: No complaints of shortness of breath, no wheezing, no cough, no SOB on exertion, no orthopnea or PND  Gastrointestinal: No complaints of abdominal pain, no constipation, no nausea or vomiting, no diarrhea or bloody stools  Genitourinary: chronic renal failure  Musculoskeletal: No complaints of arthralgia, no myalgias, no joint swelling or stiffness, no limb pain or swelling  Integumentary: No complaints of skin rash or skin lesions, no itching, no skin wound, no dry skin     Neurological: No compliants of headache, no confusion, no convulsions, no numbness or tingling, no dizziness or fainting, no limb weakness, no difficulty walking  ROS reviewed  Active Problems    1  Acute foot pain, right (729 5) (M79 671)   2  Acute right ankle pain (719 47,338 19) (M25 571)   3  Cellulitis of ankle (682 6) (L03 119)   4  Chronic kidney disease, stage IV (severe) (585 4) (N18 4)   5  Closed fracture of right calcaneus with routine healing, unspecified portion of calcaneus,   subsequent encounter (V54 19) (S92 001D)   6  Disseminated Herpes Zoster (053 8)   7  Extracapsular Cataract Extract W/ Insert Intraocular Lens Prosthesis   8  Fracture of right tibia and fibula, closed, with routine healing, subsequent encounter   (V54 16) (S82 201D,S82 401D)   9  Hypertension (401 9) (I10)   10  Screening for diabetic peripheral neuropathy (V80 09) (Z13 89)   11  Status post ORIF of fracture of ankle (V45 89,V15 51) (Z96 7,Z87 81)   12  Type 1 diabetes mellitus without complication (581 38) (M53 7)   13   Vitamin D deficiency (268 9) (E55 9)    Past Medical History    · History of Closed nondisplaced fracture of first metatarsal bone of right foot with routine  healing (V54 19) (S92 314D)   · Common cold (460) (J00)   · History of common cold (V12 09) (Z86 19)   · History of pneumonia (V12 61) (Z87 01)   · History of Sepsis, due to unspecified organism (038 9,995 91) (A41 9)   · History of Type 1 diabetes mellitus, uncontrolled (250 03) (E10 65)   · History of Urinary tract infection without hematuria, site unspecified (599 0) (N39 0)    Surgical History    · Extracapsular Cataract Extract W/ Insert Intraocular Lens Prosthesis   · History of Eye Surgery   · 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   · History of Renal Transplant    Family History  Mother    · Family history of Coronary artery disease (414 00) (I25 10)  Brother    · Family history of Type 1 Diabetes Mellitus   · Family history of Type 1 Diabetes Mellitus   · Family history of Type 1 Diabetes Mellitus    Social History    · Being A Social Drinker   · Denied: History of Drug Use   · Never a smoker   · No living will    Current Meds   1  Atenolol 25 MG Oral Tablet; Take 1 tablet daily as directed Recorded   2  Ruth Ann Contour Test In [employer]; check BS 5-6x/day; Therapy: 57SKF7877 to (Evaluate:11Oro0085)  Requested for: 34OWI1710; Last   Rx:16Jan2017 Ordered   3  BD Insulin Syr Ultrafine II 31G X 5/16" 0 5 ML Miscellaneous; use as directed; Therapy: 84IIZ9482 to (Evaluate:01Apr2018)  Requested for: 63VBX6190; Last   Rx:06Apr2017 Ordered   4  CellCept 250 MG Oral Capsule (Mycophenolate Mofetil); TAKE 3 CAPSULES TWICE   DAILY Recorded   5  Cephalexin 250 MG Oral Capsule; TAKE 1 CAPSULE 3 TIMES DAILY; Therapy: 80EZE0285 to (Evaluate:46Uyn1132)  Requested for: 31Ehy5756; Last   Rx:58Kqr4352 Ordered   6  CVS Daily Multiple Oral Tablet; Take 1 tablet daily; Therapy: (Recorded:38Rnb5278) to Recorded   7  HumaLOG 100 UNIT/ML Subcutaneous Solution; INJECT UP TO 30 UNITS   SUBCUTANEOUSLY PER DAY AS DIRECTED (PLEASE CALLOFFICE TO SCHEDULE   APPT); Therapy: 60SEK9537 to (Last Rx:18Apr2017)  Requested for: 18Apr2017 Ordered   8  Levemir 100 UNIT/ML Subcutaneous Solution; INJECT 15 UNITS SUBCUTANEOUSLY   2 TIMES A DAY; Therapy: 37KCN2075 to (Last Rx:14Jun2017)  Requested for: 20PXQ1595 Ordered   9  PredniSONE 5 MG Oral Tablet; Take 1 tablet daily Recorded   10  Prograf 0 5 MG Oral Capsule (Tacrolimus); 1 5tablets twice per day Recorded    Allergies    1  No Known Drug Allergies    Vitals   Recorded: 38JLL3299 53:59KJ   Systolic 491   Diastolic 54   Height 5 ft 4 in   Weight 112 lb    BMI Calculated 19 22   BSA Calculated 1 53     Physical Exam    Constitutional   General appearance: No acute distress, well appearing and well nourished  Eyes   Conjunctiva and lids: No swelling, erythema, or discharge  Pupils and irises: Equal, round and reactive to light      Ears, Nose, Mouth, and Throat   External inspection of ears and nose: Normal     Otoscopic examination: Tympanic membrance translucent with normal light reflex  Canals patent without erythema  Oropharynx: Normal with no erythema, edema, exudate or lesions  Pulmonary   Respiratory effort: No increased work of breathing or signs of respiratory distress  Auscultation of lungs: Clear to auscultation  Cardiovascular   Palpation of heart: Normal PMI, no thrills  Auscultation of heart: Normal rate and rhythm, normal S1 and S2, without murmurs  Examination of extremities for edema and/or varicosities: Normal     Abdomen   Abdomen: Non-tender, no masses  Liver and spleen: No hepatomegaly or splenomegaly  Lymphatic   Palpation of lymph nodes in neck: No lymphadenopathy  Musculoskeletal   Gait and station: Normal     Digits and nails: Normal without clubbing or cyanosis  Inspection/palpation of joints, bones, and muscles: Normal     Skin   Skin and subcutaneous tissue: Normal without rashes or lesions  Neurologic   Cranial nerves: Cranial nerves 2-12 intact  Reflexes: 2+ and symmetric  Sensation: No sensory loss  Psychiatric   Orientation to person, place and time: Normal     Mood and affect: Normal        Future Appointments    Date/Time Provider Specialty Site   07/21/2017 01:50 PM YINKA Walker   Orthopedic Surgery [facility]     Signatures   Electronically signed by : Jazmin Interiano DO; Jul 17 2017  4:26PM EST                       (Author)

## 2018-01-15 NOTE — MISCELLANEOUS
Message  Return to work or school:   Laney Murcia is under my professional care  He was seen in my office on June 28, 2017   He is able to return to work on  7/3/17    He is able to work with limitations  Weight Bearing Status: No Weight-Bearing  Right lower extremity in splint, no use right foot  Shanell Johansen PAC        Signatures   Electronically signed by : Shanell Johansen, [facility]; Jul 10 2017  4:20PM EST                       (Author)

## 2018-01-18 NOTE — MISCELLANEOUS
Message  Return to work or school:   Betsy Mike is under my professional care  He was seen in my office on 8/29/17 8/18/17      Weight Bearing Status: No Weight-Bearing  Right lower extremity nonweightbearing with crutches elevate much as possible  Jennifer Kennedy PAC        Signatures   Electronically signed by : Jennifer Kennedy, [facility]; Sep  5 2017  1:14PM EST                       (Author)

## 2018-01-22 VITALS
DIASTOLIC BLOOD PRESSURE: 60 MMHG | SYSTOLIC BLOOD PRESSURE: 118 MMHG | TEMPERATURE: 98 F | HEIGHT: 64 IN | WEIGHT: 112 LBS | BODY MASS INDEX: 19.12 KG/M2

## 2018-01-22 VITALS — HEART RATE: 87 BPM | DIASTOLIC BLOOD PRESSURE: 86 MMHG | SYSTOLIC BLOOD PRESSURE: 142 MMHG

## 2018-01-22 VITALS — SYSTOLIC BLOOD PRESSURE: 99 MMHG | DIASTOLIC BLOOD PRESSURE: 65 MMHG | HEART RATE: 80 BPM

## 2018-01-22 VITALS
TEMPERATURE: 97.8 F | HEIGHT: 64 IN | WEIGHT: 112 LBS | BODY MASS INDEX: 19.12 KG/M2 | DIASTOLIC BLOOD PRESSURE: 74 MMHG | SYSTOLIC BLOOD PRESSURE: 102 MMHG

## 2018-01-23 NOTE — MISCELLANEOUS
Assessment   1  Sepsis, due to unspecified organism (038 9,997 91) (A41 9)1   2  Diabetes mellitus type 1 with manifestations (250 91) (E10 8)1      1 Amended By: Carl Lozano; Nov 09 2016 5:18 PM EST    Discussion/Summary  Discussion Summary:   Patient's doing well since leaving the hospital  His antibiotic therapy is finished almost finished his pressure was a little low today, but he doesn't eat any salt at all and he's also been basically wheelchair bound until his foot heals so is not very active  I've asked him to liberalize his salt intake a little bit and also to cut his atenolol dosage in half until his pressures come up  I'd the goal pressure would be 120/80  Also, she is to: This level was a little bit on the high side, but he already had his dosage reduced while he was in the hospital1        1 Amended By: Carl Lozano; Nov 09 2016 5:25 PM EST    History of Present Illness  TCM Communication [facility]: The patient is being contacted for follow-up after hospitalization2  and  Patient is scheduled for MADELIN appt on 11/9/16 at 4:15 pm 2   Hospital Patient is to have repeat BMP and magnesium level done 11/1/16  He was hospitalized at [facility], [place], operated by [facility]  The date of admission: 10/26/2016, date of discharge: 10/31/2016  Diagnosis: Osteomyelitis of right great toe and associated sepsis D/C dx - Osteomyelitis; Cellulitis; Foot pain; HTN; Diabetes mellitus; Renal transplant, status post; Hyperkalemia; Sepsis; MIKE; Amputation right great toe procedure performed  He was discharged to home  Medications were not reviewed today  He scheduled a follow up appointment  Follow-up appointments with other specialists: Dr Loni Torres  Counseling was provided to  the patient2  2   Patient returned call to Eligio Carrillo to confirm appt  2   Communication performed and completed by1  Oma Monterroso        1 Amended By: Mohit August; Nov 07 2016 3:20 PM EST   2 Amended By: Mohit August;  Nov 07 2016 3:25 PM EST    Review of Systems  Complete-Male:   Constitutional:1  No fever or chills, feels well, no tiredness, no recent weight gain or weight loss1   Eyes:1  eyesight problems1   ENT:1  no complaints of earache, no hearing loss, no nosebleeds, no nasal discharge, no sore throat, no hoarseness1   Cardiovascular: 1  No complaints of slow heart rate, no fast heart rate, no chest pain, no palpitations, no leg claudication, no lower extremity1   Respiratory:1  No complaints of shortness of breath, no wheezing, no cough, no SOB on exertion, no orthopnea or PND1   Gastrointestinal:1  No complaints of abdominal pain, no constipation, no nausea or vomiting, no diarrhea or bloody stools1   Genitourinary:1  History of renal transplant1   Musculoskeletal: History of a partial amputation of great toe right foot secondary to osteomyelitis1   Integumentary:1  No complaints of skin rash or skin lesions, no itching, no skin wound, no dry skin1   Neurological:1  No compliants of headache, no confusion, no convulsions, no numbness or tingling, no dizziness or fainting, no limb weakness, no difficulty walking1   Psychiatric:1  Is not suicidal, no sleep disturbances, no anxiety or depression, no change in personality, no emotional problems1   Endocrine:1  No complaints of proptosis, no hot flashes, no muscle weakness, no erectile dysfunction, no deepening of the voice, no feelings of weakness1   ROS Reviewed:   ROS reviewed  1        1 Amended By: Milena Lind; Nov 09 2016 5:16 PM EST    Active Problems   1  Diabetes mellitus type 1 with manifestations (250 91) (E10 8)  2  Diabetes mellitus type II, controlled (250 00) (E11 9)  3  Disseminated Herpes Zoster (053 8)  4  Extracapsular Cataract Extract W/ Insert Intraocular Lens Prosthesis  5  Hypertension (401 9) (I10)  6  Pneumonia (486) (J18 9)  7  Type 1 diabetes mellitus without complication (893 86) (J00 8)  8  Type 1 diabetes mellitus, uncontrolled (250 03) (E10 65)  9   Urinary tract infection without hematuria, site unspecified (599 0) (N39 0)  10  Vitamin D deficiency (268 9) (E55 9)    Past Medical History   1  Common cold (460) (J00)  2  History of common cold (V12 09) (Z86 19)    Surgical History   1  Extracapsular Cataract Extract W/ Insert Intraocular Lens Prosthesis  2  History of Eye Surgery  3  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  4  History of Renal Transplant  Surgical History Reviewed: The surgical history was reviewed and updated today  1        1 Amended By: Hudson De La Vega; Nov 09 2016 5:16 PM EST    Family History  Mother   1  Family history of Coronary artery disease (414 00) (I25 10)  Brother   2  Family history of Type 1 Diabetes Mellitus  3  Family history of Type 1 Diabetes Mellitus  4  Family history of Type 1 Diabetes Mellitus  Family History Reviewed: The family history was reviewed and updated today  1        1 Amended By: Hudson De La Vega; Nov 09 2016 5:16 PM EST    Social History    · Being A Social Drinker   · Denied: History of Drug Use   · Never A Smoker    Social History Reviewed: The social history was reviewed and updated today  1  The social history was reviewed and is unchanged  1        1 Amended By: Hudson De La Vega; Nov 09 2016 5:16 PM EST    Current Meds  1  Atenolol 25 MG Oral Tablet; Take 1 tablet daily as directed Recorded  2  Ruth Ann Contour Test In [employer]; check BS 5-6x/day; Therapy: 13WEQ4853 to (Evaluate:44Ajr3356); Last Rx:18Jun2014 Ordered  3  BD Insulin Syr Ultrafine II 31G X 5/16" 0 5 ML Miscellaneous; use as directed; Therapy: 76VUH6317 to (Evaluate:75Vkf9977)  Requested for: 17CPE0748; Last   Rx:20Sep2013 Ordered  4  CellCept 250 MG Oral Capsule (Mycophenolate Mofetil); TAKE 3 CAPSULES TWICE   DAILY Recorded  5  Enalapril Maleate 5 MG Oral Tablet; Take 1 tablet daily Recorded  6   HumaLOG 100 UNIT/ML Subcutaneous Solution; INJECT UP TO 30 UNITS   SUBCUTANEOUSLY PER DAY AS DIRECTED (PLEASE CALLOFFICE TO SCHEDULE   APPT); Therapy: 10KME2141 to (Last Michelle Fair)  Requested for: 62Hhn5829 Ordered  7  Insulin Syringe 31G X 5/16" 0 5 ML Miscellaneous; USE 4 TIMES DAILY; Therapy: 83FCD8456 to (Evaluate:66Stm9812); Last Rx:31Alq4894 Ordered  8  Levemir 100 UNIT/ML Subcutaneous Solution; INJECT 15 UNITS SUBCUTANEOUSLY   twice daily; Therapy: 42UCB5021 to (Last Rx:17Oma1966)  Requested for: 52Yxn9670 Ordered  9  PredniSONE 5 MG Oral Tablet; Take 1 tablet daily Recorded  10  Prograf 0 5 MG Oral Capsule (Tacrolimus); 1 5tablets twice per day Recorded  Medication List Reviewed: The medication list was reviewed and updated today  1        1 Amended By: Casey Mercado; Nov 09 2016 5:16 PM EST    Allergies   1  No Known Drug Allergies    Vitals  Signs   Recorded: 31VSV3795 06:99YV    Systolic: 86, LUE, Sitting 1    Diastolic: 68, LUE, Sitting 1    Height: 5 ft 4 in 1    Weight: 103 lb 6 08 oz 1    BMI Calculated: 17 75 1    BSA Calculated: 1 49 1      1 Amended By: Casey Mercado; Nov 09 2016 5:17 PM EST    Physical Exam    Constitutional1    General appearance: No acute distress, well appearing and well nourished  1    Eyes1    Conjunctiva and lids: No swelling, erythema, or discharge  1    Pupils and irises: Equal, round and reactive to light  1    Ears, Nose, Mouth, and Throat1    External inspection of ears and nose: Normal 1    Otoscopic examination: Tympanic membrance translucent with normal light reflex  Canals patent without erythema  1    Nasal mucosa, septum, and turbinates: Normal without edema or erythema  1    Oropharynx: Normal with no erythema, edema, exudate or lesions  1    Pulmonary1    Respiratory effort: No increased work of breathing or signs of respiratory distress  1    Auscultation of lungs: Clear to auscultation, equal breath sounds bilaterally, no wheezes, no rales, no rhonci  1    Cardiovascular1    Palpation of heart: Normal PMI, no thrills  1    Auscultation of heart: Normal rate and rhythm, normal S1 and S2, without murmurs  1    Examination of extremities for edema and/or varicosities: Normal 1    Carotid pulses: Normal 1    Abdomen1    Abdomen: Non-tender, no masses  1    Liver and spleen: No hepatomegaly or splenomegaly  1    Lymphatic1    Palpation of lymph nodes in neck: No lymphadenopathy  1    Musculoskeletal1    Gait and station: Abnormal  1  The right foot is in a surgical boot and has a surgical dressing1   Digits and nails: Normal without clubbing or cyanosis  1    Inspection/palpation of joints, bones, and muscles: Normal 1    Skin1    Skin and subcutaneous tissue: Normal without rashes or lesions  1    Neurologic1    Cranial nerves: Cranial nerves 2-12 intact  1    Reflexes: 2+ and symmetric  1    Sensation: No sensory loss  1    Psychiatric1    Orientation to person, place and time: Normal 1    Mood and affect: Normal 1          1 Amended By: Nithya Corado;  Nov 09 2016 5:18 PM EST    Future Appointments    Date/Time Provider Specialty Site   11/04/2016 01:00 PM Nithya Corado DO Family Medicine [address] [place] [address]     Signatures   Electronically signed by : Lan Starr DO; Nov 2 2016  5:54PM EST                       (Author)    Electronically signed by : Elier John, ; Nov 7 2016  3:25PM EST                       (Author)    Electronically signed by : Lan Starr DO; Nov 9 2016  5:25PM EST                       (Author)

## 2018-01-23 NOTE — RESULT NOTES
Verified Results  (1) COMPREHENSIVE METABOLIC PANEL [id number] 21:19ZK Kashif Lower     Test Name Result Flag Reference   GLUCOSE,RANDM 61 mg/dL L    If the patient is fasting, the ADA then defines impaired fasting glucose as > 100 mg/dL and diabetes as > or equal to 123 mg/dL  Specimen collection should occur prior to Sulfasalazine administration due to the potential for falsely depressed results  Specimen collection should occur prior to Sulfapyridine administration due to the potential for falsely elevated results  SODIUM 145 mmol/L  136-145   POTASSIUM 5 3 mmol/L  3 5-5 3   CHLORIDE 117 mmol/L H 100-108   CARBON DIOXIDE 17 mmol/L L 21-32   ANION GAP (CALC) 11 mmol/L  4-13   BLOOD UREA NITROGEN 36 mg/dL H 5-25   CREATININE 1 41 mg/dL H 0 60-1 30   Standardized to IDMS reference method   CALCIUM 7 1 mg/dL L 8 3-10 1   BILI, TOTAL 0 21 mg/dL  0 20-1 00   ALK PHOSPHATAS 282 U/L H    ALT (SGPT) 36 U/L  12-78   Specimen collection should occur prior to Sulfasalazine and/or Sulfapyridine administration due to the potential for falsely depressed results  AST(SGOT) 35 U/L  5-45   Specimen collection should occur prior to Sulfasalazine administration due to the potential for falsely depressed results  ALBUMIN 2 4 g/dL L 3 5-5 0   TOTAL PROTEIN 5 7 g/dL L 6 4-8 2   eGFR 58 ml/min/1 73sq m     This is a patient instruction: Patient fasting for 8 hours or longer recommended  National Kidney Disease Education Program recommendations are as follows:  GFR calculation is accurate only with a steady state creatinine  Chronic Kidney disease less than 60 ml/min/1 73 sq  meters  Kidney failure less than 15 ml/min/1 73 sq  meters       (1) CBC/PLT/DIFF [id number] 08:18PM [facility]     Test Name Result Flag Reference   WBC COUNT 8 20 Thousand/uL  4 31-10 16   RBC COUNT 2 83 Million/uL L 3 88-5 62   HEMOGLOBIN 8 2 g/dL L 12 0-17 0   HEMATOCRIT 26 2 % L 36 5-49 3   MCV 93 fL  82-98   MCH 29 0 pg  26 8-34 3 MCHC 31 3 g/dL L 31 4-37 4   RDW 20 2 % H 11 6-15 1   PLATELET COUNT   372-878   Unable to perform due to clumped platelets Thousands/uL   Unable to perform due to clumped platelets   nRBC AUTOMATED 0 /100 WBCs     NEUTROPHILS RELATIVE PERCENT 80 % H 43-75   LYMPHOCYTES RELATIVE PERCENT 10 % L 14-44   MONOCYTES RELATIVE PERCENT 7 %  4-12   EOSINOPHILS RELATIVE PERCENT 3 %  0-6   BASOPHILS RELATIVE PERCENT 0 %  0-1   NEUTROPHILS ABSOLUTE COUNT 6 42 Thousands/? ??L  1 85-7 62   LYMPHOCYTES ABSOLUTE COUNT 0 85 Thousands/? ??L  0 60-4 47   MONOCYTES ABSOLUTE COUNT 0 59 Thousand/? ??L  0 17-1 22   EOSINOPHILS ABSOLUTE COUNT 0 23 Thousand/? ??L  0 00-0 61   BASOPHILS ABSOLUTE COUNT 0 02 Thousands/? ??L  0 00-0 10   This is a patient instruction: This test is non-fasting  Please drink two glasses of water morning of bloodwork  No Clots     (1) [facility] [id number] 08:18PM Lorna Abt     Test Name Result Flag Reference   VANCO, TROUGH 28 6 ug/mL HH 10 0-20 0   Verify that this specimen was collected immediately prior to drug administration  Reference range and result flagging reflect proper specimen collection protocol

## 2018-01-24 NOTE — MISCELLANEOUS
Assessment   1  Never a smoker  2  Acute kidney injury (nontraumatic) (584 9) (N17 9)  3  Chronic kidney disease, stage IV (severe) (585 4) (N18 4)  4  Metabolic acidosis (493 8) (E87 2)  5  Need for hepatitis C screening test (V73 89) (Z11 59)1      1 Amended By: Chelsy Neely; Aug 02 2017 3:02 PM EST    Plan  Chronic kidney disease, stage IV (severe), Metabolic acidosis, Vitamin D deficiency    · (1) VITAMIN D 25-HYDROXY; Status:Active; Requested for:02Aug2017;   Perform:[facility]; UXY:[id number]; Ordered; For:Chronic kidney disease,   stage IV (severe), Metabolic acidosis, Vitamin D deficiency; Ordered   By:Dalton Anderson  Fracture of right tibia and fibula, closed, with routine healing, subsequent encounter    · Stop: Clindamycin HCl - 300 MG Oral Capsule  Rx By: Jr Bruno; Dispense: 7 Days ; #:28 Capsule; Refill: 0;For: Fracture of right   tibia and fibula, closed, with routine healing, subsequent encounter; JASSON = N; Sent To:   [facility] [address] [facility] [employer]; Last Updated By: Chelsy Neely; 8/2/2017 4:02:35 PM  Metabolic acidosis    · (1) Arterial Blood Gases; Status:Active; Requested for:02Aug2017;   Perform:[facility]; XXU:[id number]; Ordered; For:Metabolic acidosis; Ordered By:Mayra Anderson   · (1) BASIC METABOLIC PROFILE; Status:Active; Requested for:02Aug2017;   Perform:[facility]; YFY:50VXC6631; Ordered; For:Metabolic acidosis; Ordered By:Mayra Anderson   · (1) CALCIUM; Status:Active; Requested for:02Aug2017;   Perform:[facility]; CHO:77GXO6699; Ordered; For:Metabolic acidosis; Ordered By:Mayra Anderson   · (1) LACTIC ACID; Status:Active; Requested for:02Aug2017;   Perform:[facility]; CYF:[id number]; Ordered; For:Metabolic acidosis; Ordered By:Mayra Anderson   · (1) MAGNESIUM; Status:Active; Requested for:16Ddr0908;   Perform:[facility]; KRT:99GRC5709; Ordered; For:Metabolic acidosis;    Ordered By:Mayra Anderson   · (1) PHOSPHORUS; Status:Active; Requested for:02Aug2017;   Perform:[facility]; XWF:[id number]; Ordered; For:Metabolic acidosis; Ordered By:Isac Anderson  Metabolic acidosis, Type 1 diabetes mellitus without complication    · (1) HEMOGLOBIN A1C; Status:Active; Requested for:02Aug2017;   Perform:[facility]; DBO:59SXH8438; Ordered; For:Metabolic acidosis, Type   1 diabetes mellitus without complication; Ordered By:Isac Anderson   · (1) LIPID PANEL, FASTING; Status:Active; Requested for:02Aug2017;   Perform:[facility]; PEW:15ARW6401; Ordered; For:Metabolic acidosis, Type   1 diabetes mellitus without complication; Ordered By:Dalton Anderson  Need for hepatitis C screening test    · (1) HEP C ANTIBODY; Status:Active; Requested for:02Aug2017; 1   Perform:[facility]; VKI:[id number]; Ordered; For:Need for hepatitis C   screening test; Ordered By:Dalton Anderson1      1 Amended By: Maude Rawls; Aug 02 2017 3:02 PM EST    Discussion/Summary  Discussion Summary:   I spoke with the patient's internal medicine specialist and hospitalist when he was discharged from the hospital  He still had a fairly significant metabolic acidosis with a pH of 7 2 going to ask him to repeat labs tomorrow including a lactic acid level and a blood gas to make sure that his sodium normalized  History of Present Illness  TCM Communication [facility]: The patient is being contacted for follow-up after hospitalization and Patient is scheduled for MADELIN appt on 8/2/17 at 12:45 pm per Dr Stephie Mike  He has no pending appts scheduled with our office at this time  He was hospitalized at [facility]  The date of admission: 07/31/2017, date of discharge: 08/01/2017  Diagnosis: Failure of hardware from right ankle ORIF, suspected osteomyelitis D/C dx - Osteomyelitis; Closed fracture of distal end of right tibia with routine healing; Elevated platelet count  He was discharged to home  Medications were not reviewed today     He scheduled a follow up appointment  Follow-up appointments with other specialists: are Orthopedics for further antibiotic therapy and arrangements for PICC line placement which is planned for Thursday as outpatient if blood cultures remain negative; Urology regarding evidence of incomplete bladder emptying  Communication performed and completed by Ambrocio Syed      Review of Systems  Complete-Male:   Constitutional: feeling poorly1 , feeling tired1  and recent 3 lb weight loss1  1   The patient presents with complaints of gradual onset of severe bilateral eye eyesight problems, described as blurry vision  ENT: no complaints of earache, no hearing loss, no nosebleeds, no nasal discharge, no sore throat, no hoarseness  Cardiovascular: No complaints of slow heart rate, no fast heart rate, no chest pain, no palpitations, no leg claudication, no lower extremity  Respiratory: No complaints of shortness of breath, no wheezing, no cough, no SOB on exertion, no orthopnea or PND  Gastrointestinal: No complaints of abdominal pain, no constipation, no nausea or vomiting, no diarrhea or bloody stools  Genitourinary: acute kidney injury metabolic acidosis1   Musculoskeletal: osteomyelitis right ankle  Integumentary: a rash1  and skin wound1  1   Neurological: charcots joint right ankle  Psychiatric: Is not suicidal, no sleep disturbances, no anxiety or depression, no change in personality, no emotional problems  Endocrine: type 1 diabetes,1  1   Hematologic/Lymphatic:1  No complaints of swollen glands, no swollen glands in the neck, does not bleed easily, no easy bruising1   ROS Reviewed:   ROS reviewed  1 Amended By: Kermit Porter; Aug 02 2017 2:33 PM EST    Active Problems   1  Acute foot pain, right (729 5) (M79 671)  2  Acute right ankle pain (719 47,338 19) (M25 571)  3  Cellulitis of ankle (682 6) (L03 119)  4  Chronic kidney disease, stage IV (severe) (585 4) (N18 4)  5   Closed fracture of right calcaneus with routine healing, unspecified portion of calcaneus,   subsequent encounter (V54 19) (S92 001D)  6  Disseminated Herpes Zoster (053 8)  7  Extracapsular Cataract Extract W/ Insert Intraocular Lens Prosthesis  8  Fracture of right tibia and fibula, closed, with routine healing, subsequent encounter   (V54 16) (S82 201D,S82 401D)  9  Hypertension (401 9) (I10)  10  Screening for depression (V79 0) (Z13 89)  11  Screening for diabetic peripheral neuropathy (V80 09) (Z13 89)  12  Status post ORIF of fracture of ankle (V45 89,V15 51) (Z96 7,Z87 81)  13  Type 1 diabetes mellitus without complication (804 39) (L04 0)  14  Vitamin D deficiency (268 9) (E55 9)    Past Medical History   1  History of Closed nondisplaced fracture of first metatarsal bone of right foot with routine   healing (V54 19) (S92 314D)  2  Common cold (460) (J00)  3  History of common cold (V12 09) (Z86 19)  4  History of pneumonia (V12 61) (Z87 01)  5  History of Sepsis, due to unspecified organism (038 9,995 91) (A41 9)  6  History of Type 1 diabetes mellitus, uncontrolled (250 03) (E10 65)  7  History of Urinary tract infection without hematuria, site unspecified (599 0) (N39 0)    Surgical History   1  Extracapsular Cataract Extract W/ Insert Intraocular Lens Prosthesis  2  History of Eye Surgery  3  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  4  History of Renal Transplant  Surgical History Reviewed: The surgical history was reviewed and updated today  Family History  Mother   1  Family history of Coronary artery disease (414 00) (I25 10)  Brother   2  Family history of Type 1 Diabetes Mellitus  3  Family history of Type 1 Diabetes Mellitus  4  Family history of Type 1 Diabetes Mellitus  Family History Reviewed: The family history was reviewed and updated today         Social History    · Being A Social Drinker   · Denied: History of Drug Use   · Never a smoker   · No living will  Social History Reviewed: The social history was reviewed and updated today  The social history was reviewed and is unchanged  Current Meds  1  Atenolol 25 MG Oral Tablet; Take 1 tablet daily as directed Recorded  2  Ruth Ann Contour Test In [employer]; check BS 5-6x/day; Therapy: 86JGU9481 to (Evaluate:27Hte6844)  Requested for: 18NEZ5024; Last   Rx:16Jan2017 Ordered  3  BD Insulin Syr Ultrafine II 31G X 5/16" 0 5 ML Miscellaneous; use as directed; Therapy: 11EDF3596 to (Evaluate:01Apr2018)  Requested for: 61COJ5097; Last   Rx:06Apr2017 Ordered  4  CellCept 250 MG Oral Capsule; TAKE 3 CAPSULES TWICE DAILY Recorded  5  Cephalexin 250 MG Oral Capsule; TAKE 1 CAPSULE 3 TIMES DAILY; Therapy: 69UIH1070 to (Evaluate:19Hvd1180)  Requested for: 55Out8554; Last   Rx:49Sru8026 Ordered  6  Clindamycin HCl - 300 MG Oral Capsule; TAKE 1 CAPSULE EVERY 6 HOURS DAILY; Therapy: 24JTL9543 to ([phone])  Requested for: 00Kjd9163; Last   Rx:53Cuk0643 Ordered  7  [employer] Daily Multiple Oral Tablet; Take 1 tablet daily; Therapy: (Recorded:25Hap1433) to Recorded  8  HumaLOG 100 UNIT/ML Subcutaneous Solution; INJECT UP TO 30 UNITS   SUBCUTANEOUSLY PER DAY AS DIRECTED (PLEASE CALLOFFICE TO SCHEDULE   APPT); Therapy: 64TQS8799 to (Last Rx:18Apr2017)  Requested for: 02Erg2094 Ordered  9  Levemir 100 UNIT/ML Subcutaneous Solution; INJECT 15 UNITS SUBCUTANEOUSLY 2   TIMES A DAY; Therapy: 51JDV5784 to (Last Rx:14Jun2017)  Requested for: 97HCU7488 Ordered  10  OneTouch Ultra Blue In Vitro Strip; CHECK BLOOD SUGAR 5 TO 6 TIMES DAILY; Therapy: 05EZM7305 to (Last Rx:29Pad1885)  Requested for: 70Tjq2625 Ordered  11  PredniSONE 5 MG Oral Tablet; Take 1 tablet daily Recorded  12  Prograf 0 5 MG Oral Capsule; 1 5tablets twice per day Recorded  Medication List Reviewed: The medication list was reviewed and updated today  Allergies   1   No Known Drug Allergies    Vitals  Signs   Recorded: 04Rmg4850 01:48PM   Temperature: 98 F  Systolic: 802  Diastolic: 60  Height: 5 ft 4 in  Weight: 112 lb   BMI Calculated: 19 22  BSA Calculated: 1 53    Physical Exam    Constitutional   General appearance: No acute distress, well appearing and well nourished  Eyes   Conjunctiva and lids: No swelling, erythema, or discharge  Pupils and irises: Equal, round and reactive to light  Ears, Nose, Mouth, and Throat   External inspection of ears and nose: Normal     Otoscopic examination: Tympanic membrance translucent with normal light reflex  Canals patent without erythema  Nasal mucosa, septum, and turbinates: Normal without edema or erythema  Oropharynx: Normal with no erythema, edema, exudate or lesions  Pulmonary   Respiratory effort: No increased work of breathing or signs of respiratory distress  Auscultation of lungs: Clear to auscultation, equal breath sounds bilaterally, no wheezes, no rales, no rhonci  Cardiovascular   Palpation of heart: Normal PMI, no thrills  Auscultation of heart: Normal rate and rhythm, normal S1 and S2, without murmurs  Examination of extremities for edema and/or varicosities: Normal     Carotid pulses: Normal     Abdomen   Abdomen: Non-tender, no masses  Liver and spleen: No hepatomegaly or splenomegaly  Lymphatic   Palpation of lymph nodes in neck: No lymphadenopathy  Musculoskeletal   1    Gait and station: Abnormal  1  Wheelchair1   Digits and nails: Normal without clubbing or cyanosis  1    Inspection/palpation of joints, bones, and muscles: Abnormal  1  Dressing right ankle1   Skin   Skin and subcutaneous tissue: Normal without rashes or lesions  Neurologic   Cranial nerves: Cranial nerves 2-12 intact  Reflexes: 2+ and symmetric  Sensation: No sensory loss  Psychiatric   Orientation to person, place and time: Normal     Mood and affect: Normal           1 Amended By: Milind Altamirano;  Aug 02 2017 2:34 PM EST    Future Appointments    Date/Time Provider Specialty Site   08/02/2017 12:45 PM Britany Jaimes DO Family Medicine [facility][note 3]   08/02/2017 01:00 PM YINKA Franks   Orthopedic Surgery [facility]     Signatures   Electronically signed by : Rosario Perez, ; Aug  2 2017  8:29AM EST                       (Author)    Electronically signed by : Cortez Aggarwal DO; Aug  2 2017  2:31PM EST                       (Author)    Electronically signed by : Cortez Aggarwal DO; Aug  2 2017  3:02PM EST                       (Author)

## 2018-02-10 ENCOUNTER — APPOINTMENT (OUTPATIENT)
Dept: LAB | Facility: HOSPITAL | Age: 48
End: 2018-02-10
Payer: COMMERCIAL

## 2018-02-10 ENCOUNTER — TRANSCRIBE ORDERS (OUTPATIENT)
Dept: ADMINISTRATIVE | Facility: HOSPITAL | Age: 48
End: 2018-02-10

## 2018-02-10 DIAGNOSIS — Z09 POSTOP CHECK: Primary | ICD-10-CM

## 2018-02-10 DIAGNOSIS — Z09 POSTOP CHECK: ICD-10-CM

## 2018-02-10 LAB
ANION GAP SERPL CALCULATED.3IONS-SCNC: 10 MMOL/L (ref 4–13)
BUN SERPL-MCNC: 38 MG/DL (ref 5–25)
CALCIUM SERPL-MCNC: 7.3 MG/DL (ref 8.3–10.1)
CHLORIDE SERPL-SCNC: 107 MMOL/L (ref 100–108)
CO2 SERPL-SCNC: 24 MMOL/L (ref 21–32)
CREAT SERPL-MCNC: 1.45 MG/DL (ref 0.6–1.3)
ERYTHROCYTE [DISTWIDTH] IN BLOOD BY AUTOMATED COUNT: 14.6 % (ref 11.6–15.1)
GFR SERPL CREATININE-BSD FRML MDRD: 57 ML/MIN/1.73SQ M
GLUCOSE P FAST SERPL-MCNC: 120 MG/DL (ref 65–99)
HCT VFR BLD AUTO: 25.8 % (ref 36.5–49.3)
HGB BLD-MCNC: 8 G/DL (ref 12–17)
MCH RBC QN AUTO: 27.5 PG (ref 26.8–34.3)
MCHC RBC AUTO-ENTMCNC: 31 G/DL (ref 31.4–37.4)
MCV RBC AUTO: 89 FL (ref 82–98)
PLATELET # BLD AUTO: 271 THOUSANDS/UL (ref 149–390)
PMV BLD AUTO: 9.2 FL (ref 8.9–12.7)
POTASSIUM SERPL-SCNC: 3.8 MMOL/L (ref 3.5–5.3)
RBC # BLD AUTO: 2.91 MILLION/UL (ref 3.88–5.62)
SODIUM SERPL-SCNC: 141 MMOL/L (ref 136–145)
WBC # BLD AUTO: 9.89 THOUSAND/UL (ref 4.31–10.16)

## 2018-02-10 PROCEDURE — 80048 BASIC METABOLIC PNL TOTAL CA: CPT

## 2018-02-10 PROCEDURE — 80197 ASSAY OF TACROLIMUS: CPT

## 2018-02-10 PROCEDURE — 85027 COMPLETE CBC AUTOMATED: CPT

## 2018-02-10 PROCEDURE — 36415 COLL VENOUS BLD VENIPUNCTURE: CPT

## 2018-02-11 LAB — TACROLIMUS BLD-MCNC: 8.2 NG/ML (ref 2–20)

## 2018-02-14 ENCOUNTER — HOSPITAL ENCOUNTER (EMERGENCY)
Facility: HOSPITAL | Age: 48
Discharge: ANOTHER HOSPITAL | End: 2018-02-14
Attending: EMERGENCY MEDICINE | Admitting: EMERGENCY MEDICINE
Payer: COMMERCIAL

## 2018-02-14 ENCOUNTER — APPOINTMENT (EMERGENCY)
Dept: RADIOLOGY | Facility: HOSPITAL | Age: 48
End: 2018-02-14
Payer: COMMERCIAL

## 2018-02-14 VITALS
WEIGHT: 132.28 LBS | BODY MASS INDEX: 22.58 KG/M2 | OXYGEN SATURATION: 90 % | TEMPERATURE: 95.7 F | SYSTOLIC BLOOD PRESSURE: 81 MMHG | DIASTOLIC BLOOD PRESSURE: 47 MMHG | RESPIRATION RATE: 37 BRPM | HEART RATE: 116 BPM | HEIGHT: 64 IN

## 2018-02-14 DIAGNOSIS — E87.2 LACTIC ACIDOSIS: ICD-10-CM

## 2018-02-14 DIAGNOSIS — R77.8 ELEVATED TROPONIN I LEVEL: ICD-10-CM

## 2018-02-14 DIAGNOSIS — R06.03 RESPIRATORY DISTRESS: Primary | ICD-10-CM

## 2018-02-14 DIAGNOSIS — I46.9 CARDIAC ARREST (HCC): ICD-10-CM

## 2018-02-14 DIAGNOSIS — J81.1 PULMONARY EDEMA: ICD-10-CM

## 2018-02-14 DIAGNOSIS — N18.9 ACUTE ON CHRONIC KIDNEY FAILURE (HCC): ICD-10-CM

## 2018-02-14 DIAGNOSIS — R73.9 HYPERGLYCEMIA: ICD-10-CM

## 2018-02-14 DIAGNOSIS — N17.9 ACUTE ON CHRONIC KIDNEY FAILURE (HCC): ICD-10-CM

## 2018-02-14 LAB
ALBUMIN SERPL BCP-MCNC: 2.1 G/DL (ref 3.5–5)
ALP SERPL-CCNC: 550 U/L (ref 46–116)
ALT SERPL W P-5'-P-CCNC: 38 U/L (ref 12–78)
AMORPH URATE CRY URNS QL MICRO: ABNORMAL /HPF
ANION GAP BLD CALC-SCNC: 19 MMOL/L (ref 4–13)
ANION GAP SERPL CALCULATED.3IONS-SCNC: 18 MMOL/L (ref 4–13)
ANISOCYTOSIS BLD QL SMEAR: PRESENT
APTT PPP: 72 SECONDS (ref 23–35)
AST SERPL W P-5'-P-CCNC: 63 U/L (ref 5–45)
ATRIAL RATE: 101 BPM
BACTERIA UR QL AUTO: ABNORMAL /HPF
BASE EXCESS BLDA CALC-SCNC: -15.7 MMOL/L
BASE EXCESS BLDA CALC-SCNC: -24.4 MMOL/L
BASOPHILS # BLD MANUAL: 0 THOUSAND/UL (ref 0–0.1)
BASOPHILS NFR MAR MANUAL: 0 % (ref 0–1)
BILIRUB SERPL-MCNC: 0.25 MG/DL (ref 0.2–1)
BILIRUB UR QL STRIP: NEGATIVE
BUN BLD-MCNC: 46 MG/DL (ref 5–25)
BUN SERPL-MCNC: 44 MG/DL (ref 5–25)
CA-I BLD-SCNC: 1.22 MMOL/L (ref 1.12–1.32)
CALCIUM SERPL-MCNC: 7.9 MG/DL (ref 8.3–10.1)
CHLORIDE BLD-SCNC: 109 MMOL/L (ref 100–108)
CHLORIDE SERPL-SCNC: 104 MMOL/L (ref 100–108)
CLARITY UR: ABNORMAL
CO2 SERPL-SCNC: 14 MMOL/L (ref 21–32)
COLOR UR: YELLOW
CREAT BLD-MCNC: 2 MG/DL (ref 0.6–1.3)
CREAT SERPL-MCNC: 2.27 MG/DL (ref 0.6–1.3)
EOSINOPHIL # BLD MANUAL: 0 THOUSAND/UL (ref 0–0.4)
EOSINOPHIL NFR BLD MANUAL: 0 % (ref 0–6)
ERYTHROCYTE [DISTWIDTH] IN BLOOD BY AUTOMATED COUNT: 15.8 % (ref 11.6–15.1)
GFR SERPL CREATININE-BSD FRML MDRD: 33 ML/MIN/1.73SQ M
GFR SERPL CREATININE-BSD FRML MDRD: 38 ML/MIN/1.73SQ M
GLUCOSE SERPL-MCNC: 447 MG/DL (ref 65–140)
GLUCOSE SERPL-MCNC: 460 MG/DL (ref 65–140)
GLUCOSE SERPL-MCNC: 468 MG/DL (ref 65–140)
GLUCOSE UR STRIP-MCNC: ABNORMAL MG/DL
HCO3 BLDA-SCNC: 11.4 MMOL/L (ref 22–28)
HCO3 BLDA-SCNC: 8.3 MMOL/L (ref 22–28)
HCT VFR BLD AUTO: 29.2 % (ref 36.5–49.3)
HCT VFR BLD CALC: 27 % (ref 36.5–49.3)
HGB BLD-MCNC: 8.9 G/DL (ref 12–17)
HGB BLDA-MCNC: 9.2 G/DL (ref 12–17)
HGB UR QL STRIP.AUTO: ABNORMAL
HOROWITZ INDEX BLDA+IHG-RTO: 100 MM[HG]
HOROWITZ INDEX BLDA+IHG-RTO: 100 MM[HG]
INR PPP: 1.35 (ref 0.86–1.16)
KETONES UR STRIP-MCNC: NEGATIVE MG/DL
LACTATE SERPL-SCNC: 9 MMOL/L (ref 0.5–2)
LEUKOCYTE ESTERASE UR QL STRIP: NEGATIVE
LYMPHOCYTES # BLD AUTO: 17 % (ref 14–44)
LYMPHOCYTES # BLD AUTO: 4.04 THOUSAND/UL (ref 0.6–4.47)
MAGNESIUM SERPL-MCNC: 2.1 MG/DL (ref 1.6–2.6)
MCH RBC QN AUTO: 28.1 PG (ref 26.8–34.3)
MCHC RBC AUTO-ENTMCNC: 30.5 G/DL (ref 31.4–37.4)
MCV RBC AUTO: 92 FL (ref 82–98)
MONOCYTES # BLD AUTO: 1.67 THOUSAND/UL (ref 0–1.22)
MONOCYTES NFR BLD: 7 % (ref 4–12)
NEUTROPHILS # BLD MANUAL: 17.37 THOUSAND/UL (ref 1.85–7.62)
NEUTS BAND NFR BLD MANUAL: 2 % (ref 0–8)
NEUTS SEG NFR BLD AUTO: 71 % (ref 43–75)
NITRITE UR QL STRIP: NEGATIVE
NON-SQ EPI CELLS URNS QL MICRO: ABNORMAL /HPF
NRBC BLD AUTO-RTO: 1 /100 WBC (ref 0–2)
NT-PROBNP SERPL-MCNC: ABNORMAL PG/ML
O2 CT BLDA-SCNC: 10 ML/DL (ref 16–23)
O2 CT BLDA-SCNC: 12.8 ML/DL (ref 16–23)
OXYHGB MFR BLDA: 73.5 % (ref 94–97)
OXYHGB MFR BLDA: 96.5 % (ref 94–97)
P AXIS: 52 DEGREES
PCO2 BLD: 13 MMOL/L (ref 21–32)
PCO2 BLDA: 31.4 MM HG (ref 36–44)
PCO2 BLDA: 47.8 MM HG (ref 36–44)
PEEP RESPIRATORY: 5 CM[H2O]
PEEP RESPIRATORY: 5 CM[H2O]
PH BLDA: 6.86 [PH] (ref 7.35–7.45)
PH BLDA: 7.18 [PH] (ref 7.35–7.45)
PH UR STRIP.AUTO: 6 [PH] (ref 4.5–8)
PLATELET # BLD AUTO: 509 THOUSANDS/UL (ref 149–390)
PLATELET BLD QL SMEAR: ABNORMAL
PMV BLD AUTO: 9.6 FL (ref 8.9–12.7)
PO2 BLDA: 108.2 MM HG (ref 75–129)
PO2 BLDA: 64.4 MM HG (ref 75–129)
POIKILOCYTOSIS BLD QL SMEAR: PRESENT
POLYCHROMASIA BLD QL SMEAR: PRESENT
POTASSIUM BLD-SCNC: 4.9 MMOL/L (ref 3.5–5.3)
POTASSIUM SERPL-SCNC: 5.4 MMOL/L (ref 3.5–5.3)
PR INTERVAL: 124 MS
PROT SERPL-MCNC: 6.2 G/DL (ref 6.4–8.2)
PROT UR STRIP-MCNC: ABNORMAL MG/DL
PROTHROMBIN TIME: 16.6 SECONDS (ref 12.1–14.4)
QRS AXIS: 29 DEGREES
QRSD INTERVAL: 96 MS
QT INTERVAL: 378 MS
QTC INTERVAL: 490 MS
RBC # BLD AUTO: 3.17 MILLION/UL (ref 3.88–5.62)
RBC #/AREA URNS AUTO: ABNORMAL /HPF
SODIUM BLD-SCNC: 135 MMOL/L (ref 136–145)
SODIUM SERPL-SCNC: 136 MMOL/L (ref 136–145)
SP GR UR STRIP.AUTO: 1.02 (ref 1–1.03)
SPECIMEN SOURCE: ABNORMAL
T WAVE AXIS: 154 DEGREES
TOTAL CELLS COUNTED SPEC: 100
TROPONIN I SERPL-MCNC: 3.35 NG/ML
UROBILINOGEN UR QL STRIP.AUTO: 0.2 E.U./DL
VARIANT LYMPHS # BLD AUTO: 3 %
VENT AC: 20
VENT AC: 24
VENT- AC: AC
VENT- AC: AC
VENTRICULAR RATE: 101 BPM
VT SETTING VENT: 450 ML
VT SETTING VENT: 450 ML
WBC # BLD AUTO: 23.79 THOUSAND/UL (ref 4.31–10.16)
WBC #/AREA URNS AUTO: ABNORMAL /HPF

## 2018-02-14 PROCEDURE — 96374 THER/PROPH/DIAG INJ IV PUSH: CPT

## 2018-02-14 PROCEDURE — 93005 ELECTROCARDIOGRAM TRACING: CPT | Performed by: EMERGENCY MEDICINE

## 2018-02-14 PROCEDURE — 96376 TX/PRO/DX INJ SAME DRUG ADON: CPT

## 2018-02-14 PROCEDURE — 83880 ASSAY OF NATRIURETIC PEPTIDE: CPT | Performed by: EMERGENCY MEDICINE

## 2018-02-14 PROCEDURE — 85007 BL SMEAR W/DIFF WBC COUNT: CPT | Performed by: EMERGENCY MEDICINE

## 2018-02-14 PROCEDURE — 94002 VENT MGMT INPAT INIT DAY: CPT

## 2018-02-14 PROCEDURE — 83735 ASSAY OF MAGNESIUM: CPT | Performed by: EMERGENCY MEDICINE

## 2018-02-14 PROCEDURE — 84484 ASSAY OF TROPONIN QUANT: CPT | Performed by: EMERGENCY MEDICINE

## 2018-02-14 PROCEDURE — 85027 COMPLETE CBC AUTOMATED: CPT | Performed by: EMERGENCY MEDICINE

## 2018-02-14 PROCEDURE — 85730 THROMBOPLASTIN TIME PARTIAL: CPT | Performed by: EMERGENCY MEDICINE

## 2018-02-14 PROCEDURE — 36600 WITHDRAWAL OF ARTERIAL BLOOD: CPT

## 2018-02-14 PROCEDURE — 94640 AIRWAY INHALATION TREATMENT: CPT

## 2018-02-14 PROCEDURE — 36415 COLL VENOUS BLD VENIPUNCTURE: CPT | Performed by: EMERGENCY MEDICINE

## 2018-02-14 PROCEDURE — 81001 URINALYSIS AUTO W/SCOPE: CPT | Performed by: EMERGENCY MEDICINE

## 2018-02-14 PROCEDURE — 85014 HEMATOCRIT: CPT

## 2018-02-14 PROCEDURE — 87147 CULTURE TYPE IMMUNOLOGIC: CPT | Performed by: EMERGENCY MEDICINE

## 2018-02-14 PROCEDURE — 71045 X-RAY EXAM CHEST 1 VIEW: CPT

## 2018-02-14 PROCEDURE — 82805 BLOOD GASES W/O2 SATURATION: CPT | Performed by: EMERGENCY MEDICINE

## 2018-02-14 PROCEDURE — 99292 CRITICAL CARE ADDL 30 MIN: CPT

## 2018-02-14 PROCEDURE — 80053 COMPREHEN METABOLIC PANEL: CPT | Performed by: EMERGENCY MEDICINE

## 2018-02-14 PROCEDURE — 96375 TX/PRO/DX INJ NEW DRUG ADDON: CPT

## 2018-02-14 PROCEDURE — 96372 THER/PROPH/DIAG INJ SC/IM: CPT

## 2018-02-14 PROCEDURE — 93010 ELECTROCARDIOGRAM REPORT: CPT | Performed by: INTERNAL MEDICINE

## 2018-02-14 PROCEDURE — 94760 N-INVAS EAR/PLS OXIMETRY 1: CPT

## 2018-02-14 PROCEDURE — 82948 REAGENT STRIP/BLOOD GLUCOSE: CPT

## 2018-02-14 PROCEDURE — 80047 BASIC METABLC PNL IONIZED CA: CPT

## 2018-02-14 PROCEDURE — 99291 CRITICAL CARE FIRST HOUR: CPT

## 2018-02-14 PROCEDURE — 85610 PROTHROMBIN TIME: CPT | Performed by: EMERGENCY MEDICINE

## 2018-02-14 PROCEDURE — 87040 BLOOD CULTURE FOR BACTERIA: CPT | Performed by: EMERGENCY MEDICINE

## 2018-02-14 PROCEDURE — 83605 ASSAY OF LACTIC ACID: CPT | Performed by: EMERGENCY MEDICINE

## 2018-02-14 RX ORDER — ATORVASTATIN CALCIUM 80 MG/1
80 TABLET, FILM COATED ORAL EVERY MORNING
COMMUNITY
End: 2021-04-20 | Stop reason: SDUPTHER

## 2018-02-14 RX ORDER — ASPIRIN 81 MG/1
81 TABLET ORAL DAILY
COMMUNITY
End: 2019-02-15 | Stop reason: ALTCHOICE

## 2018-02-14 RX ORDER — FENTANYL CITRATE 50 UG/ML
25 INJECTION, SOLUTION INTRAMUSCULAR; INTRAVENOUS ONCE
Status: COMPLETED | OUTPATIENT
Start: 2018-02-14 | End: 2018-02-14

## 2018-02-14 RX ORDER — MIDAZOLAM HYDROCHLORIDE 1 MG/ML
2 INJECTION INTRAMUSCULAR; INTRAVENOUS ONCE
Status: COMPLETED | OUTPATIENT
Start: 2018-02-14 | End: 2018-02-14

## 2018-02-14 RX ORDER — SODIUM CHLORIDE 450 MG/100ML
250 INJECTION, SOLUTION INTRAVENOUS CONTINUOUS
Status: DISCONTINUED | OUTPATIENT
Start: 2018-02-14 | End: 2018-02-14

## 2018-02-14 RX ORDER — DEXTROSE AND SODIUM CHLORIDE 5; .45 G/100ML; G/100ML
250 INJECTION, SOLUTION INTRAVENOUS CONTINUOUS
Status: DISCONTINUED | OUTPATIENT
Start: 2018-02-14 | End: 2018-02-14

## 2018-02-14 RX ORDER — AZATHIOPRINE 50 MG/1
50 TABLET ORAL DAILY
COMMUNITY
End: 2018-12-20 | Stop reason: HOSPADM

## 2018-02-14 RX ORDER — FENTANYL CITRATE 50 UG/ML
50 INJECTION, SOLUTION INTRAMUSCULAR; INTRAVENOUS ONCE
Status: DISCONTINUED | OUTPATIENT
Start: 2018-02-14 | End: 2018-02-14

## 2018-02-14 RX ORDER — CLINDAMYCIN HYDROCHLORIDE 300 MG/1
300 CAPSULE ORAL 4 TIMES DAILY
COMMUNITY
End: 2018-07-19 | Stop reason: ALTCHOICE

## 2018-02-14 RX ORDER — IPRATROPIUM BROMIDE AND ALBUTEROL SULFATE 2.5; .5 MG/3ML; MG/3ML
3 SOLUTION RESPIRATORY (INHALATION)
Status: DISCONTINUED | OUTPATIENT
Start: 2018-02-14 | End: 2018-02-14 | Stop reason: HOSPADM

## 2018-02-14 RX ORDER — METOPROLOL SUCCINATE 25 MG/1
25 TABLET, EXTENDED RELEASE ORAL DAILY
COMMUNITY
End: 2018-12-20 | Stop reason: HOSPADM

## 2018-02-14 RX ORDER — CALCITRIOL 0.5 UG/1
1 CAPSULE, LIQUID FILLED ORAL DAILY
COMMUNITY
End: 2019-12-01

## 2018-02-14 RX ORDER — FENTANYL CITRATE 50 UG/ML
50 INJECTION, SOLUTION INTRAMUSCULAR; INTRAVENOUS ONCE
Status: COMPLETED | OUTPATIENT
Start: 2018-02-14 | End: 2018-02-14

## 2018-02-14 RX ORDER — SODIUM CHLORIDE 450 MG/100ML
500 INJECTION, SOLUTION INTRAVENOUS CONTINUOUS
Status: DISCONTINUED | OUTPATIENT
Start: 2018-02-14 | End: 2018-02-14

## 2018-02-14 RX ORDER — SODIUM CHLORIDE 450 MG/100ML
1000 INJECTION, SOLUTION INTRAVENOUS CONTINUOUS
Status: DISCONTINUED | OUTPATIENT
Start: 2018-02-14 | End: 2018-02-14

## 2018-02-14 RX ORDER — IPRATROPIUM BROMIDE AND ALBUTEROL SULFATE 2.5; .5 MG/3ML; MG/3ML
3 SOLUTION RESPIRATORY (INHALATION) ONCE
Status: COMPLETED | OUTPATIENT
Start: 2018-02-14 | End: 2018-02-14

## 2018-02-14 RX ORDER — ASPIRIN 300 MG/1
600 SUPPOSITORY RECTAL ONCE
Status: COMPLETED | OUTPATIENT
Start: 2018-02-14 | End: 2018-02-14

## 2018-02-14 RX ORDER — CEPHALEXIN 500 MG/1
500 CAPSULE ORAL EVERY 6 HOURS SCHEDULED
COMMUNITY
End: 2018-04-12 | Stop reason: ALTCHOICE

## 2018-02-14 RX ORDER — RANITIDINE 150 MG/1
150 TABLET ORAL 2 TIMES DAILY
COMMUNITY
End: 2018-04-12 | Stop reason: ALTCHOICE

## 2018-02-14 RX ADMIN — FENTANYL CITRATE 50 MCG: 50 INJECTION, SOLUTION INTRAMUSCULAR; INTRAVENOUS at 02:04

## 2018-02-14 RX ADMIN — MIDAZOLAM 2 MG: 1 INJECTION INTRAMUSCULAR; INTRAVENOUS at 02:33

## 2018-02-14 RX ADMIN — FENTANYL CITRATE 25 MCG: 50 INJECTION, SOLUTION INTRAMUSCULAR; INTRAVENOUS at 02:33

## 2018-02-14 RX ADMIN — ASPIRIN 600 MG: 300 SUPPOSITORY RECTAL at 02:33

## 2018-02-14 RX ADMIN — MIDAZOLAM 2 MG: 1 INJECTION INTRAMUSCULAR; INTRAVENOUS at 03:30

## 2018-02-14 RX ADMIN — IPRATROPIUM BROMIDE AND ALBUTEROL SULFATE 3 ML: .5; 3 SOLUTION RESPIRATORY (INHALATION) at 03:04

## 2018-02-14 RX ADMIN — IPRATROPIUM BROMIDE AND ALBUTEROL SULFATE 3 ML: .5; 3 SOLUTION RESPIRATORY (INHALATION) at 03:05

## 2018-02-14 RX ADMIN — INSULIN LISPRO 6 UNITS: 100 INJECTION, SOLUTION INTRAVENOUS; SUBCUTANEOUS at 03:20

## 2018-02-14 NOTE — ED PROVIDER NOTES
History  Chief Complaint   Patient presents with    Respiratory Distress     Patient was observed by EMS in respiratory distress  Patient then coded  Patient had return of pulse around 3 minutes ago  Patient is a 58-year-old male brought in by EMS after being called for respiratory distress  History initially provided by EMS  When they found patient he was sitting with production of pink frothy sputum  Patient was subsequently intubated immediately for respiratory distress  Patient went into PEA arrest afterwards  In route patient did receive 6 epinephrine as well as intubated with 8 0 ETT  Upon arrival to our emergency department according to EMS, patient regained pulses  Family EN route  History provided by:  EMS personnel  History limited by: patient intubated  Cardiac Arrest   Witnessed by:  Healthcare provider and family member  Incident location:  Home  Time before ALS initiated:  Immediate  Condition upon EMS arrival:  Alert/oriented  Initial cardiac rhythm per EMS:  PEA  Treatments prior to arrival:  ACLS protocol, intubation and vascular access (IO)  Medications given prior to ED:  Epinephrine (6)  IV access type: Intraosseous  Airway:  Intubation prior to arrival  Rhythm on admission to ED:  Sinus tachycardia      Prior to Admission Medications   Prescriptions Last Dose Informant Patient Reported? Taking?    Ferric Citrate (AURYXIA) 1  MG(Fe) TABS   Yes Yes   Sig: Take by mouth   Lactobacillus (ACIDOPHILUS PO)   Yes Yes   Sig: Take 300 mg by mouth   aspirin (ECOTRIN LOW STRENGTH) 81 mg EC tablet   Yes Yes   Sig: Take 81 mg by mouth daily   atorvastatin (LIPITOR) 40 mg tablet   Yes Yes   Sig: Take 40 mg by mouth daily   azaTHIOprine (IMURAN) 50 mg tablet   Yes Yes   Sig: Take 50 mg by mouth daily   calcitriol (ROCALTROL) 0 5 MCG capsule   Yes Yes   Sig: Take 0 5 mcg by mouth daily   calcium acetate (PHOSLO) 667 mg capsule   Yes Yes   Sig: Take 667 mg by mouth 3 (three) times a day with meals   cephalexin (KEFLEX) 500 mg capsule   Yes Yes   Sig: Take 500 mg by mouth every 6 (six) hours   clindamycin (CLEOCIN) 300 MG capsule   Yes Yes   Sig: Take 300 mg by mouth 4 (four) times a day   insulin detemir (LEVEMIR) 100 units/mL subcutaneous injection   Yes Yes   Sig: Inject 10 Units under the skin 2 (two) times a day   insulin lispro (HumaLOG) 100 units/mL injection   Yes Yes   Sig: Inject under the skin 3 (three) times a day before meals   metoprolol succinate (TOPROL-XL) 25 mg 24 hr tablet   Yes Yes   Sig: Take 25 mg by mouth daily   multivitamin (THERAGRAN) TABS   Yes Yes   Sig: Take 1 tablet by mouth daily   mycophenolate (CELLCEPT) 250 mg capsule   Yes No   Sig: Take 1,000 mg by mouth every 12 (twelve) hours 3 tabs   predniSONE 5 mg tablet   Yes Yes   Sig: Take 5 mg by mouth daily   ranitidine (ZANTAC) 150 mg tablet   Yes Yes   Sig: Take 150 mg by mouth 2 (two) times a day   sodium bicarbonate 650 mg tablet   No Yes   Sig: Take 1 tablet by mouth 2 (two) times a day   tacrolimus (PROGRAF) 0 5 mg capsule   Yes Yes   Sig: Take 0 5 mg by mouth 2 (two) times a day Take 2 tabs      Facility-Administered Medications: None       Past Medical History:   Diagnosis Date    Diabetes mellitus [facility]     Diabetes mellitus type 1 ([facility]     Hypertension     Infection at site of external fixator pin ([facility]     Renal failure     Renal transplant, status post 07/21/2007       Past Surgical History:   Procedure Laterality Date    ANKLE FRACTURE SURGERY      ANKLE HARDWARE REMOVAL Right 7/31/2017    Procedure: REMOVAL HARDWARE ANKLE;  Surgeon: Yolanda Clemons MD;  [facility];  Service: Orthopedics    ANKLE HARDWARE REMOVAL Right 8/17/2017    Procedure: TIBIA FAILED HARDWARE REMOVAL;  Surgeon: Yolanda Clemons MD;  [facility];  Service: Orthopedics    CLOSED REDUCTION ANKLE Right 7/3/2017    Procedure: CLOSED REDUCTION DISTAL TIB-FIB AND CASTING VS;  Surgeon: Yolanda Clemons MD;  [facility];  Service: Orthopedics    EYE SURGERY      FRACTURE SURGERY      ORIF Rt Ankle    GLUTAMIC ACID DECARBOXYLASE (HISTORICAL)      NEPHRECTOMY TRANSPLANTED ORGAN      AR OPEN TREATMENT FRACTURE DISTAL TIBIA FIBULA Right 7/3/2017    Procedure: OPEN REDUCTION W/ INTERNAL FIXATION (ORIF); Surgeon: Amadeo Torres MD;  [facility];  Service: Orthopedics    TOE AMPUTATION Right 10/27/2016    Procedure: AMPUTATION TOE;  Surgeon: Pauline Olivier DPM;  [facility]: [facility];  Service:        Family History   Problem Relation Age of Onset    Diabetes Brother      I have reviewed and agree with the history as documented  Social History   Substance Use Topics    Smoking status: Never Smoker    Smokeless tobacco: Never Used    Alcohol use No        Review of Systems   Unable to perform ROS: Intubated       Physical Exam  ED Triage Vitals   Temperature Pulse Respirations Blood Pressure SpO2   02/14/18 0115 02/14/18 0107 02/14/18 0107 02/14/18 0107 02/14/18 0107   (!) 96 5 °F (35 8 °C) (!) 109 18 144/77 95 %      Temp Source Heart Rate Source Patient Position - Orthostatic VS BP Location FiO2 (%)   02/14/18 0115 02/14/18 0107 02/14/18 0107 02/14/18 0107 --   Temporal Monitor Lying Left arm       Pain Score       02/14/18 0331       4           Orthostatic Vital Signs  Vitals:    02/14/18 0330 02/14/18 0345 02/14/18 0400 02/14/18 0415   BP: 99/55 99/55 (!) 82/48 (!) 81/47   Pulse: (!) 114 (!) 116     Patient Position - Orthostatic VS: Lying          Physical Exam   Constitutional:   Older appearing, cachetic    HENT:   Head: Normocephalic and atraumatic  ETT 8 0   Eyes: Conjunctivae are normal  Right eye exhibits no discharge  Left eye exhibits no discharge  Right pupil 5 mm and sluggish  Left pupil 3 mm and sluggish   Neck: Neck supple  No JVD present  Cardiovascular: Regular rhythm  Tachycardia present  Pulmonary/Chest: No stridor  He has rales (diffuse rales )     Diffuse rales throughout bilateral lungs fields  Abdominal: Soft  Bowel sounds are normal  He exhibits no distension  No evidence of external trauma   Musculoskeletal:   Right external fixator    Neurological: He exhibits normal muscle tone  GCS 3- intubated  Initial exam patient has no purposeful movements   Skin: Skin is warm  Capillary refill takes less than 2 seconds  Nursing note reviewed        ED Medications  Medications   ipratropium-albuterol (DUO-NEB) 0 5-2 5 mg/3 mL inhalation solution 3 mL (3 mL Nebulization Given 2/14/18 0305)   fentanyl citrate (PF) 100 MCG/2ML 50 mcg (50 mcg Intravenous Given 2/14/18 0204)   midazolam (VERSED) injection 2 mg (2 mg Intravenous Given 2/14/18 0233)   fentanyl citrate (PF) 100 MCG/2ML 25 mcg (25 mcg Intravenous Given 2/14/18 0233)   aspirin rectal suppository 600 mg (600 mg Rectal Given 2/14/18 0233)   ipratropium-albuterol (DUO-NEB) 0 5-2 5 mg/3 mL inhalation solution 3 mL (3 mL Nebulization Given 2/14/18 0304)   insulin lispro (HumaLOG) 100 units/mL subcutaneous injection 6 Units (6 Units Subcutaneous Given 2/14/18 0320)   midazolam (VERSED) injection 2 mg (2 mg Intravenous Given 2/14/18 0330)       Diagnostic Studies  Results Reviewed     Procedure Component Value Units Date/Time    Blood gas, arterial [[id number]]  (Abnormal) Collected:  02/14/18 0314    Lab Status:  Final result Specimen:  Blood, Arterial from Brachial, Right Updated:  02/14/18 0319     pH, Arterial 7 179 (LL)     pCO2, Arterial 31 4 (L) mm Hg      pO2, Arterial 108 2 mm Hg      HCO3, Arterial 11 4 (L) mmol/L      Base Excess, Arterial -15 7 mmol/L      O2 Content, Arterial 12 8 (L) mL/dL      O2 HGB,Arterial  96 5 %      SOURCE Brachial, Right     Vent Type- AC AC     AC Rate 24     Tidal Volume 450 ml      Inspired Air (FIO2) 100     PEEP 5    Urine Microscopic [[id number]]  (Abnormal) Collected:  02/14/18 0239    Lab Status:  Final result Specimen:  Urine from Urine, Indwelling Chowdary Catheter Updated:  02/14/18 0300 RBC, UA 1-2 (A) /hpf      WBC, UA 2-4 (A) /hpf      Epithelial Cells None Seen /hpf      Bacteria, UA Occasional /hpf      AMORPH URATES Moderate /hpf     UA w Reflex to Microscopic [[id number]]  (Abnormal) Collected:  02/14/18 0239    Lab Status:  Final result Specimen:  Urine from Urine, Indwelling Chowdary Catheter Updated:  02/14/18 0250     Color, UA Yellow     Clarity, UA Slightly Cloudy     Specific Gravity, UA 1 025     pH, UA 6 0     Leukocytes, UA Negative     Nitrite, UA Negative     Protein,  (2+) (A) mg/dl      Glucose,  (1/2%) (A) mg/dl      Ketones, UA Negative mg/dl      Urobilinogen, UA 0 2 E U /dl      Bilirubin, UA Negative     Blood, UA Small (A)    Blood culture #2 [[id number]] Collected:  02/14/18 0158    Lab Status: In process Specimen:  Blood from Hand, Left Updated:  02/14/18 0246    Blood culture #1 [[id number]] Collected:  02/14/18 0230    Lab Status: In process Specimen:  Blood from Arm, Left Updated:  02/14/18 0246    B-type natriuretic peptide [[id number]]  (Abnormal) Collected:  02/14/18 0133    Lab Status:  Final result Specimen:  Blood from Arm, Left Updated:  02/14/18 0238     NT-proBNP 33,717 (H) pg/mL     CBC and differential [[id number]]  (Abnormal) Collected:  02/14/18 0133    Lab Status:  Final result Specimen:  Blood from Arm, Left Updated:  02/14/18 0230     WBC 23 79 (H) Thousand/uL      RBC 3 17 (L) Million/uL      Hemoglobin 8 9 (L) g/dL      Hematocrit 29 2 (L) %      MCV 92 fL      MCH 28 1 pg      MCHC 30 5 (L) g/dL      RDW 15 8 (H) %      MPV 9 6 fL      Platelets 998 (H) Thousands/uL     Narrative: This is an appended report  These results have been appended to a previously verified report      Troponin I [[id number]]  (Abnormal) Collected:  02/14/18 0133    Lab Status:  Final result Specimen:  Blood from Arm, Left Updated:  02/14/18 0225     Troponin I 3 35 (HH) ng/mL     Narrative:         [employer] Chemistry analyzer 99% cutoff is > 0 04 ng/mL in network labs    o cTnI 99% cutoff is useful only when applied to patients in the clinical setting of myocardial ischemia  o cTnI 99% cutoff should be interpreted in the context of clinical history, ECG findings and possibly cardiac imaging to establish correct diagnosis  o cTnI 99% cutoff may be suggestive but clearly not indicative of a coronary event without the clinical setting of myocardial ischemia  Lactic acid, plasma [[id number]]  (Abnormal) Collected:  02/14/18 0133    Lab Status:  Final result Specimen:  Blood from Arm, Left Updated:  02/14/18 0225     LACTIC ACID 9 0 (HH) mmol/L     Narrative:         Result may be elevated if tourniquet was used during collection  Magnesium [[id number]]  (Normal) Collected:  02/14/18 0133    Lab Status:  Final result Specimen:  Blood from Arm, Left Updated:  02/14/18 0222     Magnesium 2 1 mg/dL     Comprehensive metabolic panel [[id number]]  (Abnormal) Collected:  02/14/18 0133    Lab Status:  Final result Specimen:  Blood from Arm, Left Updated:  02/14/18 0222     Sodium 136 mmol/L      Potassium 5 4 (H) mmol/L      Chloride 104 mmol/L      CO2 14 (L) mmol/L      Anion Gap 18 (H) mmol/L      BUN 44 (H) mg/dL      Creatinine 2 27 (H) mg/dL      Glucose 447 (H) mg/dL      Calcium 7 9 (L) mg/dL      AST 63 (H) U/L      ALT 38 U/L      Alkaline Phosphatase 550 (H) U/L      Total Protein 6 2 (L) g/dL      Albumin 2 1 (L) g/dL      Total Bilirubin 0 25 mg/dL      eGFR 33 ml/min/1 73sq m     Narrative:       SPECIMEN IS SLIGHTLY HEMOLYZED  MAY AFFECT SOME RESULTS  National Kidney Disease Education Program recommendations are as follows:  GFR calculation is accurate only with a steady state creatinine  Chronic Kidney disease less than 60 ml/min/1 73 sq  meters  Kidney failure less than 15 ml/min/1 73 sq  meters      APTT [[id number]]  (Abnormal) Collected:  02/14/18 0133    Lab Status:  Final result Specimen:  Blood from Arm, Left Updated:  02/14/18 0208     PTT 72 (H) seconds Narrative:          Therapeutic Heparin Range = 60-90 seconds    Protime-INR [[id number]]  (Abnormal) Collected:  02/14/18 0133    Lab Status:  Final result Specimen:  Blood from Arm, Left Updated:  02/14/18 0208     Protime 16 6 (H) seconds      INR 1 35 (H)    Blood gas, arterial [[id number]]  (Abnormal) Collected:  02/14/18 0133    Lab Status:  Final result Specimen:  Blood, Arterial from Brachial, Right Updated:  02/14/18 0139     pH, Arterial 6 856 (LL)     pCO2, Arterial 47 8 (H) mm Hg      pO2, Arterial 64 4 (L) mm Hg      HCO3, Arterial 8 3 (L) mmol/L      Base Excess, Arterial -24 4 mmol/L      O2 Content, Arterial 10 0 (L) mL/dL      O2 HGB,Arterial  73 5 (L) %      SOURCE Brachial, Right     Vent Type- AC AC     AC Rate 20     Tidal Volume 450 ml      Inspired Air (FIO2) 100     PEEP 5    POCT Chem 8+ [[id number]]  (Abnormal) Collected:  02/14/18 0133    Lab Status:  Final result Updated:  02/14/18 0138     SODIUM, I-STAT 135 (L) mmol/l      Potassium, i-STAT 4 9 mmol/L      Chloride, istat 109 (H) mmol/L      CO2, i-STAT 13 (L) mmol/L      Anion Gap, Istat 19 (H) mmol/L      Calcium, Ionized i-STAT 1 22 mmol/L      BUN, I-STAT 46 (H) mg/dl      Creatinine, i-STAT 2 0 (H) mg/dl      eGFR 38 ml/min/1 73sq m      Glucose, i-STAT 460 (H) mg/dl      Hct, i-STAT 27 (L) %      Hgb, i-STAT 9 2 (L) g/dl      Specimen Type VENOUS    Fingerstick Glucose (POCT) [[id number]]  (Abnormal) Collected:  02/14/18 0118    Lab Status:  Final result Updated:  02/14/18 0119     POC Glucose 468 (H) mg/dl                  XR chest 1 view portable    (Results Pending)              Procedures  CriticalCare Time  Performed by: aRncho Casanova  Authorized by: Rancho Casanova     Critical care provider statement:     Critical care time (minutes):  60    Critical care was necessary to treat or prevent imminent or life-threatening deterioration of the following conditions:  Cardiac failure, circulatory failure, CNS failure or compromise, metabolic crisis, endocrine crisis, renal failure, respiratory failure, sepsis, shock, toxidrome, dehydration and hepatic failure    Critical care was time spent personally by me on the following activities:  Blood draw for specimens, obtaining history from patient or surrogate, development of treatment plan with patient or surrogate, discussions with consultants, evaluation of patient's response to treatment, examination of patient, ventilator management, re-evaluation of patient's condition, ordering and review of radiographic studies, ordering and review of laboratory studies and ordering and performing treatments and interventions           Phone Contacts  ED Phone Contact    ED Course  ED Course                                MDM  Number of Diagnoses or Management Options  Acute on chronic kidney failure [facility]):   Cardiac arrest [facility]):   Elevated troponin I level:   Hyperglycemia:   Lactic acidosis:   Pulmonary edema:   Respiratory distress:   Diagnosis management comments:  Patient is a 49 year male with a significant past medical history based on old records  Patient has a history of type 1 diabetic, hypertension, multiple episodes of DKA, NSTEMI, end-stage renal disease status post renal transplant 2001 from [address] [place] [address], immunosuppressed due to renal transplant,  as well as osteomyelitis and cellulitis at [address] [place] [address] coming in today after witnessed pulmonary and cardiac arrest while at home  Patient was intubated in the field with noted fulminant pulmonary edema, had PEA arrest approximately 10-15 minutes with return of spontaneous circulation after 6 rounds of epinephrine as well as chest compressions by a Kristen Cordova    Upon arrival patient did have an IO in his right shoulder, 'Y systolic, with no purposeful movements upon my initial exam     1:12 AM  Patient 50 yo male with significant past medical history who came in today after witnessed respiratory and cardiac arrest with noted fulminant pulmonary edema subsequently intubated in the field  Will obtain stat EKG, CBC, CMP, lactate, troponin, portable chest x-ray, ABG, magnesium  Will also place a Chowdary catheter  Noted fingerstick greater than 400  Will obtain more history from family when they arrive in the emergency department  Upon my initial exam during post resuscitation code, bedside cardiac ultrasound did not show pericardial effusion, there is good cardiac activity  No right heart strain identified on my views  Concern on initial H&P for worsening pulmonary edema  Given patient's significant past medical history is complicated where he follows closely at [facility], will start transfer process as patient needs higher level of care  We discussed with hospitalist at my nurse who states that patient will exceed care that can be provided here at [facility]  Patient will require not only pulmonary critical care, he will also require nephrology, transplant surgery, Orthopedics, cardiology, and possibly endocrinologist   All the services are not offered at this hospital and excedes higher level of care  EKG reveals marked artifact  This was completed at 1:26 a m   Sinus tachycardia 100 beats per minute  Intervals within normal limits and stable  QTC measured at 490 milliseconds  ST depression in Ii, III, AVF, V5, V6  There is baseline wandering  Discussed with Riddhi Salcedo - Dr James Munoz from [facility] Through transfer center/patient access  Patient most recently had surgery to the right lower extremity last week according to EMS and family  However after discussion with transfer center patient was discharged from [facility]  AT [place] not Riddhi Salcedo  He will discuss with [facility]  Concern for Riddhi Salcedo is the "bed situation"  I did my expressed my concern at great length regarding continuation of care as well as higher level facility    At this facility we do not had have pulmonary critical care, renal transplant, Nephrology, Cardiology, and potentially endocrinology  1:50 AM    Portable chest x-ray shows diffuse pulmonary edema  No pneumothorax  Will not start IV fluids as patient with new onset diffuse pulmonary edema  Patient slowly improving with airway protection and ventilation  Glucose greater than 400 on fingerstick  Pending CMP  No IV fluids given pulmonary edema  Not not start insulin drip immediately in till anion gap is resulted  Patient remains tachycardic  There are no a arrhythmias identified at this time    ABG shows marked acidosis  However this was initially post resuscitation  Will repeat and avoid sodium bicarb at this time  Patients girlfriend in ER and updated me on patient   She states that he had right lower extremity surgery down at [facility] and was discharged at the end of last week  Patient came home and was doing well  [facility] He was feeling well without any complaints  He called for her tonight respiratory distress  He has had this before  Patient had no chest pain, fevers, hemoptysis  He was eating well without vomiting, diarrhea  On Re examination of  patient - he can now shake his head and blink his eyes for yes or no  He answers questions appropriately through head shake or blinking of his eyes  He can also give thumbs up or thumbs down     Girlfriend at bedside can verify that he is more interactive and responsive  Patient remains tachycardic with no murmurs  Abdomen is soft nontender nondistended  Bowel sounds x4  Patient is in soft restraints  However does follow commands appropriately  Right lower extremity remains in an external fixator  Will give dose of fentanyl for sedation as patient is biting on the vent as well as noted higher pressures on ventilator  Patient's lungs improved with decreased rales on my exam   Patient has been taking spontaneous breath per respiratory      Patient with O2 sats 88-90% however will give sedation to help with higher pulmonary pressures  Given patient's renal transplant as well as past medical history will refrain from Lasix at this time given renal insufficiency    2:04 AM  Girlfriend wishes for brothers to come back and discuss as possible transfer to [facility]  However, while on transfer [address] had patch through to state that they did have bed availability  Will complete this transfer and family agreeable  2:25 AM  Dr Arabella Carranza accepts patient per transfer center  Family updated at length and they are agreeable for transfer to [place]  Discussed with family at length regarding laboratory data, as well as EKG and chest x-ray that I have thus far  They are aware of patient's severe acidosis  They state "he has gone into this before 2 other times  Every time they tell us that he will recover but they never check anything out  They just keep him on the ventilator he improves, then gets tube removed "  When I asked further questions patient has never had a cardiac catheterization per family, unsure of any diuretics per family members  Per respiratory therapy, patient does have improvement with his ventilatory status after sedation  Patient has improvement in his oxygenation as well as decrease in respiratory rate with sedation  Continue with fed no and/or Versed p r n  for sedation  2:40 AM  Dr Sincere Salazar on transfer center who was a medicine resident on the intensive care unit at the [address] [place] [address]  We discussed at length patient's history and physical initially as well as repeat physical exam   He is aware that patient initially had no purposeful movement; however, patient is now making purposeful movements from a neurological status  We discussed patient's respiratory status including his ventilatory settings, his improvement with sedation with p r n    He is aware of his pulmonary edema that clinically appears to be improving with intubation only  He is aware that I did refrain from Lasix at this time given acute on chronic renal failure as well as in combination with renal transplant  We discussed In regards to patient's cardiac standpoint the patient's EKG which does show marked artifact with ST segment depressions inferiorly with noted elevated troponin  Patient did have cardiac arrest with approximately 10 minutes of CPR  Will give rectal aspirin and refrain from heparin at this time  We have not ruled out pulmonary embolism at this time  Cannot CT due to acute on chronic kidney injury  In regards to renal-patient with increasing creatinine with acute on chronic kidney injury compared to old  He is aware my concern to avoid IV fluids at this time due to new onset of flash pulmonary edema    Endocrinology-patient is hyperglycemic with mildly elevated anion gap  Will give subcu insulin at this time  Will refrain from sodium bicarb as patient improving just with ventilator support at this time  Patient has multiple lab abnormalities including elevated troponin, acute on chronic renal insufficiency, elevated LFTs elevated lactate which in light of cardiac arrest and CPR can increase many these laboratory data however will need close follow-up and repeat labs  3:25 AM  Patients family updated on pending transfer as we cannot fly due to weather conditions at this time  They aware of ground transport  Long discussion regarding laboratory data including elevated troponin (which in combination of arrest, CPR, an NSTEMI) avoidance of heparin however will give a rectal aspirin  They are aware of acute on chronic renal injury  They are aware we will avoid IV fluids given his acute pulmonary edema  Patient is improving on ventilator regarding his pulmonary edema and avoidance of diuretics due to his acute kidney injury  Will give subcu insulin for his elevated glucose of mild anion gap    Family is agreeable with plans  And discussion as well as transport    4:12 AM  Prior to transport patient did have several low blood pressures  Patient has not been receiving IV fluids due to pulmonary edema  Will give 250 cc here as well as 250 cc in route  On Re exam patient's lungs have markedly improved with decreasing rales however does have mild diffuse wheezing this could be cardiac in nature  Repeat ABG prior to discharge does reveal improvement in acidosis with ventilatory support only at this time                      Amount and/or Complexity of Data Reviewed  Clinical lab tests: ordered and reviewed  Tests in the radiology section of CPT®: ordered and reviewed  Tests in the medicine section of CPT®: ordered and reviewed  Independent visualization of images, tracings, or specimens: yes (Portable chest  ETT deep  Large pleural edema )    Risk of Complications, Morbidity, and/or Mortality  Presenting problems: high  Diagnostic procedures: high  Management options: high      CritCare Time    Disposition  Final diagnoses:   Respiratory distress   Cardiac arrest ([facility] [address] )   Elevated troponin I level   Pulmonary edema     Time reflects when diagnosis was documented in both MDM as applicable and the Disposition within this note     Time User Action Codes Description Comment    2/14/2018  3:14 AM Hudson Francis Add [R06 00] Respiratory distress     2/14/2018  3:14 AM Suzanne Francis Add [I46 9] Cardiac arrest ([facility] [place] [address] )     2/14/2018  5:04 AM Hudson Francis Add [R74 8] Elevated troponin I level     2/14/2018  5:04 AM Suzanne Francis Add [J81 1] Pulmonary edema       ED Disposition     ED Disposition Condition Comment    Transfer to [facility] should be transferred out to [facility] Jonathan Mullins MD Documentation    [address] Most Recent Value   Patient Condition  The patient has been stabilized such that within reasonable medical probability, no material deterioration of the patient condition or the condition of the unborn child(tiesha) is likely to result from the transfer   Reason for Transfer  Level of Care needed not available at this facility   Benefits of Transfer  Specialized equipment and/or services available at the receiving facility (Include comment)________________________, Continuity of care   Risks of Transfer  Potential for delay in receiving treatment, Potential deterioration of medical condition, Loss of IV, Increased discomfort during transfer, Possible worsening of condition or death during transfer   Provider Certification  General risk, such as traffic hazards, adverse weather conditions, rough terrain or turbulence, possible failure of equipment (including vehicle or aircraft), or consequences of actions of persons outside the control of the transport personnel      Follow-up Information    None       Discharge Medication List as of 2/14/2018  4:33 AM      CONTINUE these medications which have NOT CHANGED    Details   aspirin (ECOTRIN LOW STRENGTH) 81 mg EC tablet Take 81 mg by mouth daily, Historical Med      atorvastatin (LIPITOR) 40 mg tablet Take 40 mg by mouth daily, Historical Med      azaTHIOprine (IMURAN) 50 mg tablet Take 50 mg by mouth daily, Historical Med      calcitriol (ROCALTROL) 0 5 MCG capsule Take 0 5 mcg by mouth daily, Historical Med      calcium acetate (PHOSLO) 667 mg capsule Take 667 mg by mouth 3 (three) times a day with meals, Historical Med      cephalexin (KEFLEX) 500 mg capsule Take 500 mg by mouth every 6 (six) hours, Historical Med      clindamycin (CLEOCIN) 300 MG capsule Take 300 mg by mouth 4 (four) times a day, Historical Med      Ferric Citrate (AURYXIA) 1  MG(Fe) TABS Take by mouth, Historical Med      insulin detemir (LEVEMIR) 100 units/mL subcutaneous injection Inject 10 Units under the skin 2 (two) times a day, Historical Med      insulin lispro (HumaLOG) 100 units/mL injection Inject under the skin 3 (three) times a day before meals, Historical Med Lactobacillus (ACIDOPHILUS PO) Take 300 mg by mouth, Historical Med      metoprolol succinate (TOPROL-XL) 25 mg 24 hr tablet Take 25 mg by mouth daily, Historical Med      multivitamin (THERAGRAN) TABS Take 1 tablet by mouth daily, Historical Med      predniSONE 5 mg tablet Take 5 mg by mouth daily, Historical Med      ranitidine (ZANTAC) 150 mg tablet Take 150 mg by mouth 2 (two) times a day, Historical Med      sodium bicarbonate 650 mg tablet Take 1 tablet by mouth 2 (two) times a day, Starting Thu 10/26/2017, Print      tacrolimus (PROGRAF) 0 5 mg capsule Take 0 5 mg by mouth 2 (two) times a day Take 2 tabs, Historical Med      mycophenolate (CELLCEPT) 250 mg capsule Take 1,000 mg by mouth every 12 (twelve) hours 3 tabs, Historical Med           No discharge procedures on file      ED Provider  Electronically Signed by           Iglesia Wellington DO  02/19/18 [id number]

## 2018-02-14 NOTE — EMTALA/ACUTE CARE TRANSFER
[address]  [address] [facility] [address]  Dept: [phone]      EMTALA TRANSFER CONSENT    NAME Carol Corona                                         1970                              MRN [id number]    I have been informed of my rights regarding examination, treatment, and transfer   by Dr Tucker Easton DO    Benefits: Specialized equipment and/or services available at the receiving facility (Include comment)________________________, Continuity of care    Risks: Potential for delay in receiving treatment, Potential deterioration of medical condition, Loss of IV, Increased discomfort during transfer, Possible worsening of condition or death during transfer      Transfer Request   I acknowledge that my medical condition has been evaluated and explained to me by the emergency department physician or other qualified medical person and/or my attending physician who has recommended and offered to me further medical examination and treatment  I understand the Hospital's obligation with respect to the treatment and stabilization of my emergency medical condition  I nevertheless request to be transferred  I release the Hospital, the doctor, and any other persons caring for me from all responsibility or liability for any injury or ill effects that may result from my transfer and agree to accept all responsibility for the consequences of my choice to transfer, rather than receive stabilizing treatment at the Hospital  I understand that because the transfer is my request, my insurance may not provide reimbursement for the services  The Hospital will assist and direct me and my family in how to make arrangements for transfer, but the hospital is not liable for any fees charged by the transport service  In spite of this understanding, I refuse to consent to further medical examination and treatment which has been offered to me, and request transfer to     I authorize the performance of emergency medical procedures and treatments upon me in both transit and upon arrival at the receiving facility  Additionally, I authorize the release of any and all medical records to the receiving facility and request they be transported with me, if possible  I authorize the performance of emergency medical procedures and treatments upon me in both transit and upon arrival at the receiving facility  Additionally, I authorize the release of any and all medical records to the receiving facility and request they be transported with me, if possible  I understand that the safest mode of transportation during a medical emergency is an ambulance and that the Hospital advocates the use of this mode of transport  Risks of traveling to the receiving facility by car, including absence of medical control, life sustaining equipment, such as oxygen, and medical personnel has been explained to me and I fully understand them  (LARISSA CORRECT BOX BELOW)  [  ]  I consent to the stated transfer and to be transported by ambulance/helicopter  [  ]  I consent to the stated transfer, but refuse transportation by ambulance and accept full responsibility for my transportation by car  I understand the risks of non-ambulance transfers and I exonerate the Hospital and its staff from any deterioration in my condition that results from this refusal     X___________________________________________    DATE  18  TIME________  Signature of patient or legally responsible individual signing on patient behalf           RELATIONSHIP TO PATIENT_________________________          Provider Certification    NAME Uriah Almendarez                                        [facility] 1970                              MRN [id number]    A medical screening exam was performed on the above named patient  Based on the examination:    Condition Necessitating Transfer The primary encounter diagnosis was Respiratory distress   A diagnosis of Cardiac arrest [facility]) was also pertinent to this visit  Patient Condition: The patient has been stabilized such that within reasonable medical probability, no material deterioration of the patient condition or the condition of the unborn child(tiesha) is likely to result from the transfer    Reason for Transfer: Level of Care needed not available at this facility    Transfer Requirements: Facility     · Space available and qualified personnel available for treatment as acknowledged by    · Agreed to accept transfer and to provide appropriate medical treatment as acknowledged by          · Appropriate medical records of the examination and treatment of the patient are provided at the time of transfer   [address] _______  · Transfer will be performed by qualified personnel from    and appropriate transfer equipment as required, including the use of necessary and appropriate life support measures  Provider Certification: I have examined the patient and explained the following risks and benefits of being transferred/refusing transfer to the patient/family:  General risk, such as traffic hazards, adverse weather conditions, rough terrain or turbulence, possible failure of equipment (including vehicle or aircraft), or consequences of actions of persons outside the control of the transport personnel      Based on these reasonable risks and benefits to the patient and/or the unborn child(tiesha), and based upon the information available at the time of the patients examination, I certify that the medical benefits reasonably to be expected from the provision of appropriate medical treatments at another medical facility outweigh the increasing risks, if any, to the individuals medical condition, and in the case of labor to the unborn child, from effecting the transfer      X____________________________________________ DATE 02/14/18        TIME_______      ORIGINAL - SEND TO MEDICAL RECORDS   COPY - SEND WITH PATIENT DURING TRANSFER

## 2018-02-17 LAB
BACTERIA BLD CULT: ABNORMAL
GRAM STN SPEC: ABNORMAL

## 2018-02-19 LAB — BACTERIA BLD CULT: NORMAL

## 2018-03-03 ENCOUNTER — APPOINTMENT (OUTPATIENT)
Dept: LAB | Facility: HOSPITAL | Age: 48
End: 2018-03-03
Payer: COMMERCIAL

## 2018-03-03 ENCOUNTER — TRANSCRIBE ORDERS (OUTPATIENT)
Dept: ADMINISTRATIVE | Facility: HOSPITAL | Age: 48
End: 2018-03-03

## 2018-03-03 DIAGNOSIS — Z94.0 KIDNEY REPLACED BY TRANSPLANT: Primary | ICD-10-CM

## 2018-03-03 DIAGNOSIS — Z94.0 KIDNEY REPLACED BY TRANSPLANT: ICD-10-CM

## 2018-03-03 LAB
ALBUMIN SERPL BCP-MCNC: 2.2 G/DL (ref 3.5–5)
ALP SERPL-CCNC: 543 U/L (ref 46–116)
ALT SERPL W P-5'-P-CCNC: 49 U/L (ref 12–78)
ANION GAP SERPL CALCULATED.3IONS-SCNC: 10 MMOL/L (ref 4–13)
AST SERPL W P-5'-P-CCNC: 50 U/L (ref 5–45)
BASOPHILS # BLD AUTO: 0.07 THOUSANDS/ΜL (ref 0–0.1)
BASOPHILS NFR BLD AUTO: 1 % (ref 0–1)
BILIRUB SERPL-MCNC: 0.4 MG/DL (ref 0.2–1)
BUN SERPL-MCNC: 35 MG/DL (ref 5–25)
CALCIUM SERPL-MCNC: 8.2 MG/DL (ref 8.3–10.1)
CHLORIDE SERPL-SCNC: 108 MMOL/L (ref 100–108)
CO2 SERPL-SCNC: 21 MMOL/L (ref 21–32)
CREAT SERPL-MCNC: 1.58 MG/DL (ref 0.6–1.3)
EOSINOPHIL # BLD AUTO: 0.31 THOUSAND/ΜL (ref 0–0.61)
EOSINOPHIL NFR BLD AUTO: 3 % (ref 0–6)
ERYTHROCYTE [DISTWIDTH] IN BLOOD BY AUTOMATED COUNT: 17.8 % (ref 11.6–15.1)
GFR SERPL CREATININE-BSD FRML MDRD: 51 ML/MIN/1.73SQ M
GLUCOSE P FAST SERPL-MCNC: 119 MG/DL (ref 65–99)
HCT VFR BLD AUTO: 24.3 % (ref 36.5–49.3)
HGB BLD-MCNC: 7.5 G/DL (ref 12–17)
LYMPHOCYTES # BLD AUTO: 2.38 THOUSANDS/ΜL (ref 0.6–4.47)
LYMPHOCYTES NFR BLD AUTO: 23 % (ref 14–44)
MCH RBC QN AUTO: 28.1 PG (ref 26.8–34.3)
MCHC RBC AUTO-ENTMCNC: 30.9 G/DL (ref 31.4–37.4)
MCV RBC AUTO: 91 FL (ref 82–98)
MONOCYTES # BLD AUTO: 1.11 THOUSAND/ΜL (ref 0.17–1.22)
MONOCYTES NFR BLD AUTO: 11 % (ref 4–12)
NEUTROPHILS # BLD AUTO: 6.46 THOUSANDS/ΜL (ref 1.85–7.62)
NEUTS SEG NFR BLD AUTO: 62 % (ref 43–75)
PLATELET # BLD AUTO: 376 THOUSANDS/UL (ref 149–390)
PMV BLD AUTO: 9 FL (ref 8.9–12.7)
POTASSIUM SERPL-SCNC: 4.6 MMOL/L (ref 3.5–5.3)
PROT SERPL-MCNC: 5.6 G/DL (ref 6.4–8.2)
RBC # BLD AUTO: 2.67 MILLION/UL (ref 3.88–5.62)
SODIUM SERPL-SCNC: 139 MMOL/L (ref 136–145)
WBC # BLD AUTO: 10.33 THOUSAND/UL (ref 4.31–10.16)

## 2018-03-03 PROCEDURE — 80053 COMPREHEN METABOLIC PANEL: CPT

## 2018-03-03 PROCEDURE — 80197 ASSAY OF TACROLIMUS: CPT

## 2018-03-03 PROCEDURE — 36415 COLL VENOUS BLD VENIPUNCTURE: CPT

## 2018-03-03 PROCEDURE — 85025 COMPLETE CBC W/AUTO DIFF WBC: CPT

## 2018-03-04 LAB — TACROLIMUS BLD-MCNC: 4.9 NG/ML (ref 2–20)

## 2018-03-17 ENCOUNTER — TRANSCRIBE ORDERS (OUTPATIENT)
Dept: ADMINISTRATIVE | Facility: HOSPITAL | Age: 48
End: 2018-03-17

## 2018-03-17 ENCOUNTER — APPOINTMENT (OUTPATIENT)
Dept: LAB | Facility: HOSPITAL | Age: 48
End: 2018-03-17
Payer: COMMERCIAL

## 2018-03-17 DIAGNOSIS — I12.9 RENAL HYPERTENSION: Primary | ICD-10-CM

## 2018-03-17 DIAGNOSIS — Z94.0 KIDNEY REPLACED BY TRANSPLANT: ICD-10-CM

## 2018-03-17 DIAGNOSIS — I12.9 RENAL HYPERTENSION: ICD-10-CM

## 2018-03-17 LAB
ALBUMIN SERPL BCP-MCNC: 2.3 G/DL (ref 3.5–5)
ALP SERPL-CCNC: 413 U/L (ref 46–116)
ALT SERPL W P-5'-P-CCNC: 22 U/L (ref 12–78)
ANION GAP SERPL CALCULATED.3IONS-SCNC: 11 MMOL/L (ref 4–13)
ANISOCYTOSIS BLD QL SMEAR: PRESENT
AST SERPL W P-5'-P-CCNC: 19 U/L (ref 5–45)
BASOPHILS # BLD MANUAL: 0 THOUSAND/UL (ref 0–0.1)
BASOPHILS NFR MAR MANUAL: 0 % (ref 0–1)
BILIRUB SERPL-MCNC: 0.5 MG/DL (ref 0.2–1)
BUN SERPL-MCNC: 50 MG/DL (ref 5–25)
CALCIUM SERPL-MCNC: 7.7 MG/DL (ref 8.3–10.1)
CHLORIDE SERPL-SCNC: 104 MMOL/L (ref 100–108)
CO2 SERPL-SCNC: 22 MMOL/L (ref 21–32)
CREAT SERPL-MCNC: 2.08 MG/DL (ref 0.6–1.3)
EOSINOPHIL # BLD MANUAL: 0.34 THOUSAND/UL (ref 0–0.4)
EOSINOPHIL NFR BLD MANUAL: 4 % (ref 0–6)
ERYTHROCYTE [DISTWIDTH] IN BLOOD BY AUTOMATED COUNT: 18.3 % (ref 11.6–15.1)
GFR SERPL CREATININE-BSD FRML MDRD: 37 ML/MIN/1.73SQ M
GLUCOSE P FAST SERPL-MCNC: 101 MG/DL (ref 65–99)
HCT VFR BLD AUTO: 27.6 % (ref 36.5–49.3)
HGB BLD-MCNC: 8.9 G/DL (ref 12–17)
LYMPHOCYTES # BLD AUTO: 2.88 THOUSAND/UL (ref 0.6–4.47)
LYMPHOCYTES # BLD AUTO: 34 % (ref 14–44)
MCH RBC QN AUTO: 28.3 PG (ref 26.8–34.3)
MCHC RBC AUTO-ENTMCNC: 32.2 G/DL (ref 31.4–37.4)
MCV RBC AUTO: 88 FL (ref 82–98)
MICROCYTES BLD QL AUTO: PRESENT
MONOCYTES # BLD AUTO: 0.76 THOUSAND/UL (ref 0–1.22)
MONOCYTES NFR BLD: 9 % (ref 4–12)
NEUTROPHILS # BLD MANUAL: 4.15 THOUSAND/UL (ref 1.85–7.62)
NEUTS BAND NFR BLD MANUAL: 1 % (ref 0–8)
NEUTS SEG NFR BLD AUTO: 48 % (ref 43–75)
PLATELET # BLD AUTO: 284 THOUSANDS/UL (ref 149–390)
PLATELET BLD QL SMEAR: ADEQUATE
PMV BLD AUTO: 9.3 FL (ref 8.9–12.7)
POLYCHROMASIA BLD QL SMEAR: PRESENT
POTASSIUM SERPL-SCNC: 3.9 MMOL/L (ref 3.5–5.3)
PROT SERPL-MCNC: 5.7 G/DL (ref 6.4–8.2)
RBC # BLD AUTO: 3.15 MILLION/UL (ref 3.88–5.62)
SODIUM SERPL-SCNC: 137 MMOL/L (ref 136–145)
TOTAL CELLS COUNTED SPEC: 100
VARIANT LYMPHS # BLD AUTO: 4 %
WBC # BLD AUTO: 8.46 THOUSAND/UL (ref 4.31–10.16)

## 2018-03-17 PROCEDURE — 80197 ASSAY OF TACROLIMUS: CPT

## 2018-03-17 PROCEDURE — 85027 COMPLETE CBC AUTOMATED: CPT

## 2018-03-17 PROCEDURE — 80053 COMPREHEN METABOLIC PANEL: CPT

## 2018-03-17 PROCEDURE — 85007 BL SMEAR W/DIFF WBC COUNT: CPT

## 2018-03-17 PROCEDURE — 36415 COLL VENOUS BLD VENIPUNCTURE: CPT

## 2018-03-18 LAB — TACROLIMUS BLD-MCNC: 4.5 NG/ML (ref 2–20)

## 2018-03-24 ENCOUNTER — APPOINTMENT (OUTPATIENT)
Dept: LAB | Facility: HOSPITAL | Age: 48
End: 2018-03-24
Payer: COMMERCIAL

## 2018-03-24 ENCOUNTER — TRANSCRIBE ORDERS (OUTPATIENT)
Dept: ADMINISTRATIVE | Facility: HOSPITAL | Age: 48
End: 2018-03-24

## 2018-03-24 DIAGNOSIS — Z79.899 ENCOUNTER FOR LONG-TERM (CURRENT) USE OF MEDICATIONS: ICD-10-CM

## 2018-03-24 DIAGNOSIS — Z79.899 ENCOUNTER FOR LONG-TERM (CURRENT) USE OF MEDICATIONS: Primary | ICD-10-CM

## 2018-03-24 DIAGNOSIS — Z94.0 KIDNEY REPLACED BY TRANSPLANT: ICD-10-CM

## 2018-03-24 LAB
ALBUMIN SERPL BCP-MCNC: 1.8 G/DL (ref 3.5–5)
ALP SERPL-CCNC: 251 U/L (ref 46–116)
ALT SERPL W P-5'-P-CCNC: 27 U/L (ref 12–78)
ANION GAP SERPL CALCULATED.3IONS-SCNC: 12 MMOL/L (ref 4–13)
ANISOCYTOSIS BLD QL SMEAR: PRESENT
AST SERPL W P-5'-P-CCNC: 25 U/L (ref 5–45)
BASOPHILS # BLD MANUAL: 0 THOUSAND/UL (ref 0–0.1)
BASOPHILS NFR MAR MANUAL: 0 % (ref 0–1)
BILIRUB SERPL-MCNC: 0.4 MG/DL (ref 0.2–1)
BUN SERPL-MCNC: 55 MG/DL (ref 5–25)
CALCIUM SERPL-MCNC: 7.1 MG/DL (ref 8.3–10.1)
CHLORIDE SERPL-SCNC: 108 MMOL/L (ref 100–108)
CO2 SERPL-SCNC: 18 MMOL/L (ref 21–32)
CREAT SERPL-MCNC: 1.88 MG/DL (ref 0.6–1.3)
CRP SERPL QL: 24.8 MG/L
EOSINOPHIL # BLD MANUAL: 0.27 THOUSAND/UL (ref 0–0.4)
EOSINOPHIL NFR BLD MANUAL: 3 % (ref 0–6)
ERYTHROCYTE [DISTWIDTH] IN BLOOD BY AUTOMATED COUNT: 18.7 % (ref 11.6–15.1)
ERYTHROCYTE [SEDIMENTATION RATE] IN BLOOD: 2 MM/HOUR (ref 0–10)
EST. AVERAGE GLUCOSE BLD GHB EST-MCNC: 117 MG/DL
GFR SERPL CREATININE-BSD FRML MDRD: 41 ML/MIN/1.73SQ M
GLUCOSE P FAST SERPL-MCNC: 126 MG/DL (ref 65–99)
HBA1C MFR BLD: 5.7 % (ref 4.2–6.3)
HCT VFR BLD AUTO: 26.2 % (ref 36.5–49.3)
HGB BLD-MCNC: 8.2 G/DL (ref 12–17)
LG PLATELETS BLD QL SMEAR: PRESENT
LYMPHOCYTES # BLD AUTO: 1.61 THOUSAND/UL (ref 0.6–4.47)
LYMPHOCYTES # BLD AUTO: 18 % (ref 14–44)
MCH RBC QN AUTO: 27.6 PG (ref 26.8–34.3)
MCHC RBC AUTO-ENTMCNC: 31.3 G/DL (ref 31.4–37.4)
MCV RBC AUTO: 88 FL (ref 82–98)
MONOCYTES # BLD AUTO: 0.27 THOUSAND/UL (ref 0–1.22)
MONOCYTES NFR BLD: 3 % (ref 4–12)
NEUTROPHILS # BLD MANUAL: 6.35 THOUSAND/UL (ref 1.85–7.62)
NEUTS SEG NFR BLD AUTO: 71 % (ref 43–75)
PLATELET # BLD AUTO: 332 THOUSANDS/UL (ref 149–390)
PLATELET BLD QL SMEAR: ADEQUATE
PMV BLD AUTO: 9.1 FL (ref 8.9–12.7)
POTASSIUM SERPL-SCNC: 4.8 MMOL/L (ref 3.5–5.3)
PROT SERPL-MCNC: 4.8 G/DL (ref 6.4–8.2)
RBC # BLD AUTO: 2.97 MILLION/UL (ref 3.88–5.62)
SODIUM SERPL-SCNC: 138 MMOL/L (ref 136–145)
TOTAL CELLS COUNTED SPEC: 100
VARIANT LYMPHS # BLD AUTO: 5 %
WBC # BLD AUTO: 8.95 THOUSAND/UL (ref 4.31–10.16)

## 2018-03-24 PROCEDURE — 80197 ASSAY OF TACROLIMUS: CPT

## 2018-03-24 PROCEDURE — 85027 COMPLETE CBC AUTOMATED: CPT

## 2018-03-24 PROCEDURE — 86140 C-REACTIVE PROTEIN: CPT

## 2018-03-24 PROCEDURE — 36415 COLL VENOUS BLD VENIPUNCTURE: CPT

## 2018-03-24 PROCEDURE — 83036 HEMOGLOBIN GLYCOSYLATED A1C: CPT

## 2018-03-24 PROCEDURE — 85007 BL SMEAR W/DIFF WBC COUNT: CPT

## 2018-03-24 PROCEDURE — 80053 COMPREHEN METABOLIC PANEL: CPT

## 2018-03-24 PROCEDURE — 85652 RBC SED RATE AUTOMATED: CPT

## 2018-03-25 LAB — TACROLIMUS BLD-MCNC: 2.9 NG/ML (ref 2–20)

## 2018-03-28 ENCOUNTER — TRANSCRIBE ORDERS (OUTPATIENT)
Dept: ADMINISTRATIVE | Facility: HOSPITAL | Age: 48
End: 2018-03-28

## 2018-03-28 DIAGNOSIS — S82.891A CLOSED FRACTURE OF RIGHT ANKLE, INITIAL ENCOUNTER: Primary | ICD-10-CM

## 2018-04-02 ENCOUNTER — HOSPITAL ENCOUNTER (OUTPATIENT)
Dept: CT IMAGING | Facility: HOSPITAL | Age: 48
Discharge: HOME | End: 2018-04-02
Payer: COMMERCIAL

## 2018-04-02 ENCOUNTER — APPOINTMENT (OUTPATIENT)
Dept: LAB | Facility: HOSPITAL | Age: 48
End: 2018-04-02
Payer: COMMERCIAL

## 2018-04-02 DIAGNOSIS — I12.9 RENAL HYPERTENSION: ICD-10-CM

## 2018-04-02 DIAGNOSIS — Z94.0 KIDNEY REPLACED BY TRANSPLANT: ICD-10-CM

## 2018-04-02 DIAGNOSIS — S82.891A CLOSED FRACTURE OF RIGHT ANKLE, INITIAL ENCOUNTER: ICD-10-CM

## 2018-04-02 LAB
ALBUMIN SERPL BCP-MCNC: 2 G/DL (ref 3.5–5)
ALP SERPL-CCNC: 351 U/L (ref 46–116)
ALT SERPL W P-5'-P-CCNC: 44 U/L (ref 12–78)
ANION GAP SERPL CALCULATED.3IONS-SCNC: 13 MMOL/L (ref 4–13)
AST SERPL W P-5'-P-CCNC: 23 U/L (ref 5–45)
BASOPHILS # BLD AUTO: 0.04 THOUSANDS/ΜL (ref 0–0.1)
BASOPHILS NFR BLD AUTO: 0 % (ref 0–1)
BILIRUB SERPL-MCNC: 0.3 MG/DL (ref 0.2–1)
BUN SERPL-MCNC: 52 MG/DL (ref 5–25)
CALCIUM SERPL-MCNC: 8.3 MG/DL (ref 8.3–10.1)
CHLORIDE SERPL-SCNC: 112 MMOL/L (ref 100–108)
CO2 SERPL-SCNC: 13 MMOL/L (ref 21–32)
CREAT SERPL-MCNC: 2.32 MG/DL (ref 0.6–1.3)
EOSINOPHIL # BLD AUTO: 0.42 THOUSAND/ΜL (ref 0–0.61)
EOSINOPHIL NFR BLD AUTO: 5 % (ref 0–6)
ERYTHROCYTE [DISTWIDTH] IN BLOOD BY AUTOMATED COUNT: 20 % (ref 11.6–15.1)
FERRITIN SERPL-MCNC: 153 NG/ML (ref 8–388)
GFR SERPL CREATININE-BSD FRML MDRD: 32 ML/MIN/1.73SQ M
GLUCOSE SERPL-MCNC: 94 MG/DL (ref 65–140)
HCT VFR BLD AUTO: 26.3 % (ref 36.5–49.3)
HGB BLD-MCNC: 8.4 G/DL (ref 12–17)
IRON SATN MFR SERPL: 35 %
IRON SERPL-MCNC: 70 UG/DL (ref 65–175)
LYMPHOCYTES # BLD AUTO: 2.78 THOUSANDS/ΜL (ref 0.6–4.47)
LYMPHOCYTES NFR BLD AUTO: 31 % (ref 14–44)
MCH RBC QN AUTO: 28.7 PG (ref 26.8–34.3)
MCHC RBC AUTO-ENTMCNC: 31.9 G/DL (ref 31.4–37.4)
MCV RBC AUTO: 90 FL (ref 82–98)
MONOCYTES # BLD AUTO: 1.1 THOUSAND/ΜL (ref 0.17–1.22)
MONOCYTES NFR BLD AUTO: 12 % (ref 4–12)
NEUTROPHILS # BLD AUTO: 4.78 THOUSANDS/ΜL (ref 1.85–7.62)
NEUTS SEG NFR BLD AUTO: 52 % (ref 43–75)
PLATELET # BLD AUTO: 444 THOUSANDS/UL (ref 149–390)
PMV BLD AUTO: 8.1 FL (ref 8.9–12.7)
POTASSIUM SERPL-SCNC: 5.3 MMOL/L (ref 3.5–5.3)
PROT SERPL-MCNC: 5.5 G/DL (ref 6.4–8.2)
RBC # BLD AUTO: 2.93 MILLION/UL (ref 3.88–5.62)
SODIUM SERPL-SCNC: 138 MMOL/L (ref 136–145)
TACROLIMUS BLD-MCNC: 3 NG/ML (ref 2–20)
TIBC SERPL-MCNC: 202 UG/DL (ref 250–450)
TRANSFERRIN SERPL-MCNC: 180 MG/DL (ref 200–400)
WBC # BLD AUTO: 9.12 THOUSAND/UL (ref 4.31–10.16)

## 2018-04-02 PROCEDURE — 73700 CT LOWER EXTREMITY W/O DYE: CPT

## 2018-04-02 PROCEDURE — 80053 COMPREHEN METABOLIC PANEL: CPT

## 2018-04-02 PROCEDURE — 83540 ASSAY OF IRON: CPT

## 2018-04-02 PROCEDURE — 84466 ASSAY OF TRANSFERRIN: CPT

## 2018-04-02 PROCEDURE — 85025 COMPLETE CBC W/AUTO DIFF WBC: CPT

## 2018-04-02 PROCEDURE — 80197 ASSAY OF TACROLIMUS: CPT

## 2018-04-02 PROCEDURE — 82728 ASSAY OF FERRITIN: CPT

## 2018-04-02 PROCEDURE — 36415 COLL VENOUS BLD VENIPUNCTURE: CPT

## 2018-04-02 PROCEDURE — 83550 IRON BINDING TEST: CPT

## 2018-04-12 ENCOUNTER — APPOINTMENT (EMERGENCY)
Dept: RADIOLOGY | Facility: HOSPITAL | Age: 48
End: 2018-04-12
Payer: COMMERCIAL

## 2018-04-12 ENCOUNTER — HOSPITAL ENCOUNTER (EMERGENCY)
Facility: HOSPITAL | Age: 48
Discharge: LEFT BEFORE DISCHARGE | End: 2018-04-12
Payer: COMMERCIAL

## 2018-04-12 ENCOUNTER — APPOINTMENT (EMERGENCY)
Dept: CT IMAGING | Facility: HOSPITAL | Age: 48
End: 2018-04-12
Payer: COMMERCIAL

## 2018-04-12 VITALS
HEART RATE: 99 BPM | OXYGEN SATURATION: 100 % | BODY MASS INDEX: 21.46 KG/M2 | TEMPERATURE: 98.9 F | RESPIRATION RATE: 19 BRPM | DIASTOLIC BLOOD PRESSURE: 53 MMHG | SYSTOLIC BLOOD PRESSURE: 112 MMHG | WEIGHT: 125 LBS

## 2018-04-12 DIAGNOSIS — D72.829 LEUKOCYTOSIS: Primary | ICD-10-CM

## 2018-04-12 DIAGNOSIS — R20.0 NUMBNESS: ICD-10-CM

## 2018-04-12 LAB
ALBUMIN SERPL BCP-MCNC: 1.7 G/DL (ref 3.5–5)
ALP SERPL-CCNC: 223 U/L (ref 46–116)
ALT SERPL W P-5'-P-CCNC: 19 U/L (ref 12–78)
ANION GAP SERPL CALCULATED.3IONS-SCNC: 12 MMOL/L (ref 4–13)
ANISOCYTOSIS BLD QL SMEAR: PRESENT
APTT PPP: 39 SECONDS (ref 23–35)
AST SERPL W P-5'-P-CCNC: 24 U/L (ref 5–45)
BASOPHILS # BLD MANUAL: 0.43 THOUSAND/UL (ref 0–0.1)
BASOPHILS NFR MAR MANUAL: 2 % (ref 0–1)
BILIRUB SERPL-MCNC: 0.3 MG/DL (ref 0.2–1)
BUN SERPL-MCNC: 41 MG/DL (ref 5–25)
CALCIUM SERPL-MCNC: 7.4 MG/DL
CHLORIDE SERPL-SCNC: 113 MMOL/L (ref 100–108)
CO2 SERPL-SCNC: 11 MMOL/L (ref 21–32)
CREAT SERPL-MCNC: 2.11 MG/DL (ref 0.6–1.3)
EOSINOPHIL # BLD MANUAL: 0.43 THOUSAND/UL (ref 0–0.4)
EOSINOPHIL NFR BLD MANUAL: 2 % (ref 0–6)
ERYTHROCYTE [DISTWIDTH] IN BLOOD BY AUTOMATED COUNT: 19.9 % (ref 11.6–15.1)
GFR SERPL CREATININE-BSD FRML MDRD: 36 ML/MIN/1.73SQ M
GLUCOSE SERPL-MCNC: 66 MG/DL (ref 65–140)
HCT VFR BLD AUTO: 30 % (ref 36.5–49.3)
HGB BLD-MCNC: 9.8 G/DL (ref 12–17)
INR PPP: 1.09 (ref 0.86–1.16)
LYMPHOCYTES # BLD AUTO: 13 % (ref 14–44)
LYMPHOCYTES # BLD AUTO: 2.79 THOUSAND/UL (ref 0.6–4.47)
MAGNESIUM SERPL-MCNC: 1.8 MG/DL (ref 1.6–2.6)
MCH RBC QN AUTO: 29.2 PG (ref 26.8–34.3)
MCHC RBC AUTO-ENTMCNC: 32.7 G/DL (ref 31.4–37.4)
MCV RBC AUTO: 89 FL (ref 82–98)
MONOCYTES # BLD AUTO: 1.72 THOUSAND/UL (ref 0–1.22)
MONOCYTES NFR BLD: 8 % (ref 4–12)
NEUTROPHILS # BLD MANUAL: 15.66 THOUSAND/UL (ref 1.85–7.62)
NEUTS BAND NFR BLD MANUAL: 5 % (ref 0–8)
NEUTS SEG NFR BLD AUTO: 68 % (ref 43–75)
PLATELET # BLD AUTO: 578 THOUSANDS/UL (ref 149–390)
PLATELET BLD QL SMEAR: ABNORMAL
PMV BLD AUTO: 7.6 FL (ref 8.9–12.7)
POTASSIUM SERPL-SCNC: 5.4 MMOL/L (ref 3.5–5.3)
PROT SERPL-MCNC: 5.3 G/DL (ref 6.4–8.2)
PROTHROMBIN TIME: 14 SECONDS (ref 12.1–14.4)
RBC # BLD AUTO: 3.36 MILLION/UL (ref 3.88–5.62)
SODIUM SERPL-SCNC: 136 MMOL/L (ref 136–145)
TOTAL CELLS COUNTED SPEC: 100
TROPONIN I SERPL-MCNC: 0.04 NG/ML
TSH SERPL DL<=0.05 MIU/L-ACNC: 2.21 UIU/ML (ref 0.36–3.74)
VARIANT LYMPHS # BLD AUTO: 2 %
WBC # BLD AUTO: 21.45 THOUSAND/UL (ref 4.31–10.16)

## 2018-04-12 PROCEDURE — 83735 ASSAY OF MAGNESIUM: CPT | Performed by: PHYSICIAN ASSISTANT

## 2018-04-12 PROCEDURE — 84443 ASSAY THYROID STIM HORMONE: CPT | Performed by: PHYSICIAN ASSISTANT

## 2018-04-12 PROCEDURE — 93005 ELECTROCARDIOGRAM TRACING: CPT

## 2018-04-12 PROCEDURE — 36415 COLL VENOUS BLD VENIPUNCTURE: CPT | Performed by: PHYSICIAN ASSISTANT

## 2018-04-12 PROCEDURE — 84484 ASSAY OF TROPONIN QUANT: CPT | Performed by: PHYSICIAN ASSISTANT

## 2018-04-12 PROCEDURE — 80053 COMPREHEN METABOLIC PANEL: CPT | Performed by: PHYSICIAN ASSISTANT

## 2018-04-12 PROCEDURE — 96360 HYDRATION IV INFUSION INIT: CPT

## 2018-04-12 PROCEDURE — 85610 PROTHROMBIN TIME: CPT | Performed by: PHYSICIAN ASSISTANT

## 2018-04-12 PROCEDURE — 85007 BL SMEAR W/DIFF WBC COUNT: CPT | Performed by: PHYSICIAN ASSISTANT

## 2018-04-12 PROCEDURE — 70450 CT HEAD/BRAIN W/O DYE: CPT

## 2018-04-12 PROCEDURE — 71045 X-RAY EXAM CHEST 1 VIEW: CPT

## 2018-04-12 PROCEDURE — 85730 THROMBOPLASTIN TIME PARTIAL: CPT | Performed by: PHYSICIAN ASSISTANT

## 2018-04-12 PROCEDURE — 99285 EMERGENCY DEPT VISIT HI MDM: CPT

## 2018-04-12 PROCEDURE — 85027 COMPLETE CBC AUTOMATED: CPT | Performed by: PHYSICIAN ASSISTANT

## 2018-04-12 RX ORDER — CLINDAMYCIN HYDROCHLORIDE 300 MG/1
300 CAPSULE ORAL 4 TIMES DAILY
Qty: 28 CAPSULE | Refills: 0 | Status: SHIPPED | OUTPATIENT
Start: 2018-04-12 | End: 2018-04-19

## 2018-04-12 RX ORDER — LEVOFLOXACIN 500 MG/1
500 TABLET, FILM COATED ORAL DAILY
Qty: 7 TABLET | Refills: 0 | Status: SHIPPED | OUTPATIENT
Start: 2018-04-12 | End: 2018-04-19

## 2018-04-12 RX ORDER — CLINDAMYCIN HYDROCHLORIDE 150 MG/1
300 CAPSULE ORAL ONCE
Status: COMPLETED | OUTPATIENT
Start: 2018-04-12 | End: 2018-04-12

## 2018-04-12 RX ORDER — SODIUM CHLORIDE 9 MG/ML
125 INJECTION, SOLUTION INTRAVENOUS CONTINUOUS
Status: DISCONTINUED | OUTPATIENT
Start: 2018-04-12 | End: 2018-04-12 | Stop reason: HOSPADM

## 2018-04-12 RX ADMIN — LEVOFLOXACIN 750 MG: 500 TABLET, FILM COATED ORAL at 20:13

## 2018-04-12 RX ADMIN — CLINDAMYCIN HYDROCHLORIDE 300 MG: 150 CAPSULE ORAL at 20:13

## 2018-04-12 RX ADMIN — SODIUM CHLORIDE 125 ML/HR: 0.9 INJECTION, SOLUTION INTRAVENOUS at 18:40

## 2018-04-12 NOTE — DISCHARGE INSTRUCTIONS
Leukocytosis   WHAT YOU NEED TO KNOW:   Leukocytosis is a condition that causes you to have too many white blood cells (WBC)  WBCs are part of your immune system and help fight infections and diseases  DISCHARGE INSTRUCTIONS:   Medicines:   · Medicines  may be given to decrease inflammation or treat an infection  You may also be given medicine to decrease acid levels in your body or urine  · Take your medicine as directed  Contact your healthcare provider if you think your medicine is not helping or if you have side effects  Tell him of her if you are allergic to any medicine  Keep a list of the medicines, vitamins, and herbs you take  Include the amounts, and when and why you take them  Bring the list or the pill bottles to follow-up visits  Carry your medicine list with you in case of an emergency  Follow up with your healthcare provider as directed: You may need more tests or treatment  You may also be referred to a specialist  Write down your questions so you remember to ask them during your visits  Do not smoke: If you smoke, it is never too late to quit  Ask for information about how to stop smoking if you need help  Contact your healthcare provider or specialist if:   · You have a fever  · You bruise or bleed easily  · You are nauseated, lose weight without trying, or have a poor appetite  · You feel weak, tired, or sick  · You are a man and you have a painful erection that lasts longer than usual      · You have questions or concerns about your condition or care  Seek care immediately or call 911 if:   · You have any of the following signs of a stroke:     ¨ Part of your face droops or is numb    ¨ Weakness in an arm or leg    ¨ Confusion or difficulty speaking    ¨ Dizziness, a severe headache, or vision loss    · You have chest pain or trouble breathing  · You have bleeding that does not stop  · You have new pain or tingling in your arms, legs, or abdomen      · You have sudden back pain  © 2017 [facility] Information is for End User's use only and may not be sold, redistributed or otherwise used for commercial purposes  All illustrations and images included in CareNotes® are the copyrighted property of [employer]  or [employer]  The above information is an  only  It is not intended as medical advice for individual conditions or treatments  Talk to your doctor, nurse or pharmacist before following any medical regimen to see if it is safe and effective for you

## 2018-04-12 NOTE — ED PROVIDER NOTES
History  Chief Complaint   Patient presents with    Numbness     pt states he has had left hand numbness for past 2 days  states while he was sleeping today it was worse  last time he experienced this he had a heart attack; 2 months ago  denies CP or SOB     Patient presents to the emergency department today offering a chief complaint intermittent bilateral hand numbness of the palms of the hands that commence yesterday afternoon  No history of trauma  Patient states the symptoms last a few minutes and experiences of every few hours  He denies range of motion deficits of the head or strength deficits  Denies numbness over the dorsum of the hands  Essentially is isolated to fingers 1 through 5 of the palmar aspect of the hands bilaterally  He denies headache neck pain or fever  Denies shortness of breath or chest pain  Denies visual acuity deficits  Denies confusion  It is of note that the patient had a myocardial infarction few months ago and had stent placement  Patient also has a cast on his right foot that was placed yesterday by his orthopedic physician Dr Yulissa August at [facility] in [facility]  Prior to Admission Medications   Prescriptions Last Dose Informant Patient Reported? Taking?    Ferric Citrate (AURYXIA) 1  MG(Fe) TABS   Yes Yes   Sig: Take by mouth   Lactobacillus (ACIDOPHILUS PO)   Yes Yes   Sig: Take 300 mg by mouth   aspirin (ECOTRIN LOW STRENGTH) 81 mg EC tablet   Yes Yes   Sig: Take 81 mg by mouth daily   atorvastatin (LIPITOR) 40 mg tablet   Yes Yes   Sig: Take 40 mg by mouth daily   azaTHIOprine (IMURAN) 50 mg tablet   Yes Yes   Sig: Take 50 mg by mouth daily   calcitriol (ROCALTROL) 0 5 MCG capsule   Yes Yes   Sig: Take 0 5 mcg by mouth daily   calcium acetate (PHOSLO) 667 mg capsule   Yes Yes   Sig: Take 667 mg by mouth 3 (three) times a day with meals   clindamycin (CLEOCIN) 300 MG capsule   Yes Yes   Sig: Take 300 mg by mouth 4 (four) times a day   insulin detemir (LEVEMIR) 100 units/mL subcutaneous injection   Yes Yes   Sig: Inject 10 Units under the skin 2 (two) times a day   insulin lispro (HumaLOG) 100 units/mL injection   Yes Yes   Sig: Inject under the skin 3 (three) times a day before meals   metoprolol succinate (TOPROL-XL) 25 mg 24 hr tablet   Yes Yes   Sig: Take 25 mg by mouth daily   multivitamin (THERAGRAN) TABS   Yes Yes   Sig: Take 1 tablet by mouth daily   mycophenolate (CELLCEPT) 250 mg capsule   Yes Yes   Sig: Take 1,000 mg by mouth every 12 (twelve) hours 3 tabs   predniSONE 5 mg tablet   Yes Yes   Sig: Take 5 mg by mouth daily   sodium bicarbonate 650 mg tablet   No Yes   Sig: Take 1 tablet by mouth 2 (two) times a day   tacrolimus (PROGRAF) 0 5 mg capsule   Yes Yes   Sig: Take 0 5 mg by mouth 2 (two) times a day Take 2 tabs      Facility-Administered Medications: None       Past Medical History:   Diagnosis Date    [facility] ([facility] )     Diabetes mellitus ([facility] )     Diabetes mellitus type 1 ([facility] )     Hypertension     [facility]     MI (myocardial infarction) ([facility] )     Renal failure     Renal transplant, status post 07/21/2007       Past Surgical History:   Procedure Laterality Date    ANKLE FRACTURE SURGERY      ANKLE HARDWARE REMOVAL Right 7/31/2017    Procedure: REMOVAL HARDWARE ANKLE;  Surgeon: Monty Varela MD;  [facility];  Service: Orthopedics    ANKLE HARDWARE REMOVAL Right 8/17/2017    Procedure: TIBIA FAILED HARDWARE REMOVAL;  Surgeon: Monty Varela MD;  [facility];  Service: Orthopedics    CARDIAC SURGERY      2 stents    CLOSED REDUCTION ANKLE Right 7/3/2017    Procedure: CLOSED REDUCTION DISTAL TIB-FIB AND CASTING VS;  Surgeon: Monty Varela MD;  [facility];  Service: Orthopedics    EYE SURGERY      FRACTURE SURGERY      ORIF Rt Ankle    GLUTAMIC ACID DECARBOXYLASE (HISTORICAL)      NEPHRECTOMY TRANSPLANTED ORGAN      MS OPEN TREATMENT FRACTURE DISTAL TIBIA FIBULA Right 7/3/2017    Procedure: OPEN REDUCTION W/ INTERNAL FIXATION (ORIF); Surgeon: Salima Corona MD;  [facility];  Service: Orthopedics    TOE AMPUTATION Right 10/27/2016    Procedure: AMPUTATION TOE;  Surgeon: Glen Arora DPM;  [facility];  Service:        Family History   Problem Relation Age of Onset    Diabetes Brother      I have reviewed and agree with the history as documented  Social History   Substance Use Topics    Smoking status: Never Smoker    Smokeless tobacco: Never Used    Alcohol use No        Review of Systems   Constitutional: Negative  HENT: Negative  Eyes: Negative  Respiratory: Negative  Cardiovascular: Negative  Gastrointestinal: Negative  Endocrine: Negative  Genitourinary: Negative  Musculoskeletal: Negative  Skin: Negative  Allergic/Immunologic: Negative  Neurological: Positive for numbness  Negative for dizziness, tremors, seizures, syncope, facial asymmetry, speech difficulty, weakness, light-headedness and headaches  Hematological: Negative  Psychiatric/Behavioral: Negative  All other systems reviewed and are negative  Physical Exam  ED Triage Vitals [04/12/18 1624]   Temperature Pulse Respirations Blood Pressure SpO2   98 9 °F (37 2 °C) 102 18 102/52 96 %      Temp Source Heart Rate Source Patient Position - Orthostatic VS BP Location FiO2 (%)   Temporal Monitor Sitting Right arm --      Pain Score       No Pain           Orthostatic Vital Signs  Vitals:    04/12/18 1624 04/12/18 1815   BP: 102/52 112/53   Pulse: 102 99   Patient Position - Orthostatic VS: Sitting Sitting       Physical Exam   Constitutional: He is oriented to person, place, and time  He appears well-developed and well-nourished  No distress  HENT:   Head: Normocephalic  Eyes: Pupils are equal, round, and reactive to light  Neck: Normal range of motion  Cardiovascular: Normal rate      Pulmonary/Chest: Effort normal and breath sounds normal  No respiratory distress  He has no wheezes  He has no rales  He exhibits no tenderness  Abdominal: Soft  There is no tenderness  Neurological: He is alert and oriented to person, place, and time  He has normal strength  He is not disoriented  No cranial nerve deficit or sensory deficit  He displays a negative Romberg sign  GCS eye subscore is 4  GCS verbal subscore is 5  GCS motor subscore is 6  Skin: Capillary refill takes less than 2 seconds  He is not diaphoretic  Patient is a cast on the right foot  There appears to be brownish drainage coming from the heel that has soaked through a cast into the sock  Psychiatric: He has a normal mood and affect  Vitals reviewed  ED Medications  Medications   sodium chloride 0 9 % infusion (125 mL/hr Intravenous New Bag 4/12/18 1840)   levofloxacin (LEVAQUIN) tablet 750 mg (not administered)   clindamycin (CLEOCIN) capsule 300 mg (not administered)       Diagnostic Studies  Results Reviewed     Procedure Component Value Units Date/Time    Comprehensive metabolic panel [[id number]]  (Abnormal) Collected:  04/12/18 1829    Lab Status:  Final result Specimen:  Blood from Arm, Right Updated:  04/12/18 1902     Sodium 136 mmol/L      Potassium 5 4 (H) mmol/L      Chloride 113 (H) mmol/L      CO2 11 (L) mmol/L      Anion Gap 12 mmol/L      BUN 41 (H) mg/dL      Creatinine 2 11 (H) mg/dL      Glucose 66 mg/dL      Calcium 7 4 mg/dL      AST 24 U/L      ALT 19 U/L      Alkaline Phosphatase 223 (H) U/L      Total Protein 5 3 (L) g/dL      Albumin 1 7 (L) g/dL      Total Bilirubin 0 30 mg/dL      eGFR 36 ml/min/1 73sq m     Narrative:         National Kidney Disease Education Program recommendations are as follows:  GFR calculation is accurate only with a steady state creatinine  Chronic Kidney disease less than 60 ml/min/1 73 sq  meters  Kidney failure less than 15 ml/min/1 73 sq  meters      TSH [[id number]]  (Normal) Collected:  04/12/18 1829    Lab Status: Final result Specimen:  Blood from Arm, Right Updated:  04/12/18 1902     TSH 3RD GENERATON 2 214 uIU/mL     Narrative:         Patients undergoing fluorescein dye angiography may retain small amounts of fluorescein in the body for 48-72 hours post procedure  Samples containing fluorescein can produce falsely depressed TSH values  If the patient had this procedure,a specimen should be resubmitted post fluorescein clearance  Magnesium [[id number]]  (Normal) Collected:  04/12/18 1829    Lab Status:  Final result Specimen:  Blood from Arm, Right Updated:  04/12/18 1902     Magnesium 1 8 mg/dL     CBC and differential [[id number]]  (Abnormal) Collected:  04/12/18 1829    Lab Status:  Final result Specimen:  Blood from Arm, Right Updated:  04/12/18 1900     WBC 21 45 (H) Thousand/uL      RBC 3 36 (L) Million/uL      Hemoglobin 9 8 (L) g/dL      Hematocrit 30 0 (L) %      MCV 89 fL      MCH 29 2 pg      MCHC 32 7 g/dL      RDW 19 9 (H) %      MPV 7 6 (L) fL      Platelets 163 (H) Thousands/uL     Narrative: This is an appended report  These results have been appended to a previously verified report  Troponin I [[id number]]  (Normal) Collected:  04/12/18 1829    Lab Status:  Final result Specimen:  Blood from Arm, Right Updated:  04/12/18 1855     Troponin I 0 04 ng/mL     Narrative:         [employer] Chemistry analyzer 99% cutoff is > 0 04 ng/mL in network labs    o cTnI 99% cutoff is useful only when applied to patients in the clinical setting of myocardial ischemia  o cTnI 99% cutoff should be interpreted in the context of clinical history, ECG findings and possibly cardiac imaging to establish correct diagnosis  o cTnI 99% cutoff may be suggestive but clearly not indicative of a coronary event without the clinical setting of myocardial ischemia      Julito Richards [[id number]]  (Normal) Collected:  04/12/18 1829    Lab Status:  Final result Specimen:  Blood from Arm, Right Updated:  04/12/18 1853     Protime 14 0 seconds      INR 1 09    APTT [[id number]]  (Abnormal) Collected:  04/12/18 1829    Lab Status:  Final result Specimen:  Blood from Arm, Right Updated:  04/12/18 1853     PTT 39 (H) seconds                  CT head without contrast   Final Result by Janny Dee MD (04/12 1848)      No acute intracranial abnormality  Workstation performed: [id number]         XR chest 1 view portable   Final Result by Janny Dee MD (04/12 1840)      Scattered bibasilar atelectasis  Workstation performed: [id number]                    Procedures  Procedures       Phone Contacts  ED Phone Contact    ED Course  ED Course as of Apr 12 1955   Thu Apr 12, 2018   1737 Blood Pressure: 102/52   1739 Temperature: 98 9 °F (37 2 °C)   1739 Pulse: 102   1739 Respirations: 18   1739 SpO2: 96 %   1740 Blood Pressure: 102/52   1740 Temperature: 98 9 °F (37 2 °C)   1740 Pulse: 102   1740 Respirations: 18   1740 SpO2: 96 %   1914 Improving Creatinine: (!) 2 11   1915 Improving Alkaline Phosphatase: (!) 223   1915 Improvement Total Protein: (!) 5 3   1915 Albumin: (!) 1 7   1915 TSH 3RD GENERATON: 2 214   1915 Magnesium: 1 8   1918 Impression       No acute intracranial abnormality  1918 Impression       Scattered bibasilar atelectasis  1919 Segs Relative: 76   1919 Bands Relative: 5   1942 I had an extensive conversation with the patient regarding his elevated white blood cell count and elevated liver function tests  His white blood cell count appears to be acutely elevated although he is on 5 mg of prednisone he has been on this chronically had prior white blood cell counts have been normal   There is 68 segs and 5 bands which are within normal limits  Patient is liver function tests have been elevated however improving over the last month, he states that his primary care provider has viewed these labs  I have concern for developing cellulitis or osteomyelitis of the right foot    Patient will not allow be to cut the cast off the right foot  I offered to fold the patient's orthopedic physician to help guide the case however he refuses  He states he will call as family doctor tomorrow and wishes to only be placed on oral antibiotics  MDM  CritCare Time    Disposition  Final diagnoses:   Leukocytosis   Numbness - Bilateral hands intermittent     Time reflects when diagnosis was documented in both MDM as applicable and the Disposition within this note     Time User Action Codes Description Comment    4/12/2018  7:44 PM Kayley Franco Add [M13 743] Leukocytosis     4/12/2018  7:44 PM Jose WALTON Add [R20 0] Numbness     4/12/2018  7:44 PM Jose WALTON Modify [R20 0] Numbness Bilateral hands intermittent      ED Disposition     ED Disposition Condition Comment    Discharge  Ector Jerry discharge to home/self care  Condition at discharge: Stable        Follow-up Information    None       Patient's Medications   Discharge Prescriptions    No medications on file     No discharge procedures on file      ED Provider  Electronically Signed by           Alise Horne PA-C  04/12/18 3469

## 2018-04-12 NOTE — ED NOTES
At this time patient being noncompliant with medication treatment  Refusing cardiac monitoring and to remove t-shirt  ED Tech helped patient into bathroom via wheelchair          Andre Coleman RN  04/12/18 0812

## 2018-04-13 ENCOUNTER — HOSPITAL ENCOUNTER (EMERGENCY)
Facility: HOSPITAL | Age: 48
Discharge: HOME | End: 2018-04-13
Admitting: EMERGENCY MEDICINE
Payer: COMMERCIAL

## 2018-04-13 VITALS
RESPIRATION RATE: 16 BRPM | HEART RATE: 100 BPM | DIASTOLIC BLOOD PRESSURE: 70 MMHG | WEIGHT: 125 LBS | TEMPERATURE: 98.4 F | OXYGEN SATURATION: 100 % | HEIGHT: 64 IN | BODY MASS INDEX: 21.34 KG/M2 | SYSTOLIC BLOOD PRESSURE: 129 MMHG

## 2018-04-13 DIAGNOSIS — Z51.89 VISIT FOR WOUND CHECK: ICD-10-CM

## 2018-04-13 DIAGNOSIS — Z46.89 CAST REMOVAL: Primary | ICD-10-CM

## 2018-04-13 LAB
ATRIAL RATE: 100 BPM
P AXIS: 50 DEGREES
PR INTERVAL: 116 MS
QRS AXIS: 36 DEGREES
QRSD INTERVAL: 94 MS
QT INTERVAL: 346 MS
QTC INTERVAL: 446 MS
T WAVE AXIS: 116 DEGREES
VENTRICULAR RATE: 100 BPM

## 2018-04-13 PROCEDURE — 93010 ELECTROCARDIOGRAM REPORT: CPT | Performed by: INTERNAL MEDICINE

## 2018-04-13 PROCEDURE — 99283 EMERGENCY DEPT VISIT LOW MDM: CPT

## 2018-04-13 NOTE — ED PROVIDER NOTES
History  Chief Complaint   Patient presents with    Cast Removal     c/o cast removal from R ankle that was placed on Wednesday 04/11/2018 related to ankle fracture  Pt states that orthopedic in contact with "Daniel HILL about coming today"  Denies any recent fall/trauma  Patient presents to the emergency department today I have his cast removed  I saw the patient here yesterday for bilateral hand numbness  He was worked up and was found to have a [place] blood [place] of 81079  Patient denied any other symptoms  It was of note that the patient had a cast on the right foot for 24 hours placed by his orthopedic doctor  I did note there was brown drainage at that time  I recommended that the cast be removed and consult to his orthopedic doctor however he refused yesterday signed out against medical advice and said he would be following up with his orthopedic group  Today they supposed oblique called and were told to come back to the ER to have the cast removed and continue the antibiotic therapy that was started last evening  At this point patient is completely asymptomatic  He denies fevers chills weakness  Denies chest pain shortness of breath  Denies pain of the foot  Prior to Admission Medications   Prescriptions Last Dose Informant Patient Reported? Taking?    Ferric Citrate (AURYXIA) 1  MG(Fe) TABS   Yes No   Sig: Take by mouth   Lactobacillus (ACIDOPHILUS PO)   Yes No   Sig: Take 300 mg by mouth   aspirin (ECOTRIN LOW STRENGTH) 81 mg EC tablet   Yes No   Sig: Take 81 mg by mouth daily   atorvastatin (LIPITOR) 40 mg tablet   Yes No   Sig: Take 40 mg by mouth daily   azaTHIOprine (IMURAN) 50 mg tablet   Yes No   Sig: Take 50 mg by mouth daily   calcitriol (ROCALTROL) 0 5 MCG capsule   Yes No   Sig: Take 0 5 mcg by mouth daily   calcium acetate (PHOSLO) 667 mg capsule   Yes No   Sig: Take 667 mg by mouth 3 (three) times a day with meals   clindamycin (CLEOCIN) 300 MG capsule   Yes No   Sig: Take 300 mg by mouth 4 (four) times a day   clindamycin (CLEOCIN) 300 MG capsule   No No   Sig: Take 1 capsule (300 mg total) by mouth 4 (four) times a day for 7 days   insulin detemir (LEVEMIR) 100 units/mL subcutaneous injection   Yes No   Sig: Inject 10 Units under the skin 2 (two) times a day   insulin lispro (HumaLOG) 100 units/mL injection   Yes No   Sig: Inject under the skin 3 (three) times a day before meals   levofloxacin (LEVAQUIN) 500 mg tablet   No No   Sig: Take 1 tablet (500 mg total) by mouth daily for 7 days   metoprolol succinate (TOPROL-XL) 25 mg 24 hr tablet   Yes No   Sig: Take 25 mg by mouth daily   multivitamin (THERAGRAN) TABS   Yes No   Sig: Take 1 tablet by mouth daily   mycophenolate (CELLCEPT) 250 mg capsule   Yes No   Sig: Take 1,000 mg by mouth every 12 (twelve) hours 3 tabs   predniSONE 5 mg tablet   Yes No   Sig: Take 5 mg by mouth daily   sodium bicarbonate 650 mg tablet   No No   Sig: Take 1 tablet by mouth 2 (two) times a day   tacrolimus (PROGRAF) 0 5 mg capsule   Yes No   Sig: Take 0 5 mg by mouth 2 (two) times a day Take 2 tabs      Facility-Administered Medications: None       Past Medical History:   Diagnosis Date    [facility] ([facility] )     [facility]     [facility]     Hypertension     [facility]     MI (myocardial infarction) ([facility] )     Renal failure     Renal transplant, status post 07/21/2007       Past Surgical History:   Procedure Laterality Date    ANKLE FRACTURE SURGERY      ANKLE HARDWARE REMOVAL Right 7/31/2017    Procedure: REMOVAL HARDWARE ANKLE;  Surgeon: Zoie Hernandez MD;  [facility];  Service: Orthopedics    ANKLE HARDWARE REMOVAL Right 8/17/2017    Procedure: TIBIA FAILED HARDWARE REMOVAL;  Surgeon: Zoie Hernandez MD;  [facility];  Service: Orthopedics    CARDIAC SURGERY      2 stents    CLOSED REDUCTION ANKLE Right 7/3/2017 Procedure: CLOSED REDUCTION DISTAL TIB-FIB AND CASTING VS;  Surgeon: Ector Luna MD;  [facility];  Service: Orthopedics    EYE SURGERY      FRACTURE SURGERY      ORIF Rt Ankle    GLUTAMIC ACID DECARBOXYLASE (HISTORICAL)      NEPHRECTOMY TRANSPLANTED ORGAN      RI OPEN TREATMENT FRACTURE DISTAL TIBIA FIBULA Right 7/3/2017    Procedure: OPEN REDUCTION W/ INTERNAL FIXATION (ORIF); Surgeon: Ector Luna MD;  [facility];  Service: Orthopedics    TOE AMPUTATION Right 10/27/2016    Procedure: AMPUTATION TOE;  Surgeon: Nicolette Mcfarlane DPM;  [facility];  Service:        Family History   Problem Relation Age of Onset    Diabetes Brother      I have reviewed and agree with the history as documented  Social History   Substance Use Topics    Smoking status: Never Smoker    Smokeless tobacco: Never Used    Alcohol use No        Review of Systems   Constitutional: Negative  HENT: Negative  Eyes: Negative  Respiratory: Negative  Cardiovascular: Negative  Gastrointestinal: Negative  Endocrine: Negative  Genitourinary: Negative  Musculoskeletal:        Cast on right foot with apparent brown discharge of the heel   Skin: Negative  Allergic/Immunologic: Negative  Neurological: Negative  Hematological: Negative  Psychiatric/Behavioral: Negative  All other systems reviewed and are negative  Physical Exam  ED Triage Vitals [04/13/18 1937]   Temperature Pulse Respirations Blood Pressure SpO2   98 4 °F (36 9 °C) 97 16 110/52 100 %      Temp Source Heart Rate Source Patient Position - Orthostatic VS BP Location FiO2 (%)   Temporal Monitor Sitting Right arm --      Pain Score       No Pain           Orthostatic Vital Signs  Vitals:    04/13/18 1937   BP: 110/52   Pulse: 97   Patient Position - Orthostatic VS: Sitting       Physical Exam   Constitutional: He is oriented to person, place, and time  He appears well-developed and well-nourished   No distress  HENT:   Head: Normocephalic  Eyes: Pupils are equal, round, and reactive to light  Neck: Normal range of motion  Cardiovascular: Normal rate  Pulmonary/Chest: Effort normal    Abdominal: Soft  Neurological: He is alert and oriented to person, place, and time  Skin: Capillary refill takes less than 2 seconds  He is not diaphoretic  Patient's toes are exposed right-sided  No evidence of heat or coldness  Flesh colored toes without tenderness  Psychiatric: He has a normal mood and affect  Vitals reviewed  ED Medications  Medications - No data to display    Diagnostic Studies  Results Reviewed     None                 No orders to display              Procedures  Procedures       Phone Contacts  ED Phone Contact    ED Course  ED Course as of Apr 13 2015 Fri Apr 13, 2018   1950 Alex called, paging on call ortho    1954 I spoke with Dr Ander Whitney, on-call orthopedics at 8  She did review the patient's notes from today's telephone conversation  She agrees with the cast being cut off here at [facility], patient will undergo daily dressing changes continue the antibiotics and follow up with them as an outpatient  2012 I wasAble to easily removed the cast and dressings  Post removal of the dressing wounds were evaluated  There is no evidence of swelling erythema or purulent drainage  There was clear serosanguineous drainage noted from the lateral aspect of the right foot toward the area of the right 5th distal metatarsal   Again this was clear and non purulent  Patient will complete daily wound care, take the antibiotics, and follow up with Orthopedics  2015 Normal neurovascular motor exams were noted post removal of the cast and dressings                                  [facility]  CritCare Time    Disposition  Final diagnoses:   Cast removal   Visit for wound check     Time reflects when diagnosis was documented in both MDM as applicable and the Disposition within this note     Time User Action Codes Description Comment    4/13/2018  8:13 PM Kayley Franco Add [Z46 89] Cast removal     4/13/2018  8:13 PM Kayley Franco Add [Z51 89] Visit for wound check       ED Disposition     ED Disposition Condition Comment    Discharge  Ector Jerry discharge to home/self care  Condition at discharge: Good        Follow-up Information     Follow up With Specialties Details Why Contact Info Additional Information    [facility]  Go to       [facility] Emergency Department Emergency Medicine  If you notice any increased redness swelling 5th thick discharge or fevers [facility] ED, [address], [place], [address] [facility], [id number]        Patient's Medications   Discharge Prescriptions    No medications on file     No discharge procedures on file      ED Provider  Electronically Signed by           Alise Horne PA-C  04/13/18 2015

## 2018-04-14 NOTE — DISCHARGE INSTRUCTIONS
Acute Wound Care   AMBULATORY CARE:   An acute wound  is an injury that causes a break in the skin  An acute wound can happen suddenly, last a short time, and may heal on its own  Common signs and symptoms of an acute wound:   · A cut, tear, or gash in your skin    · Bleeding, swelling, pain, or trouble moving the affected area    · Dirt or foreign objects inside the wound     · Milky, yellow, green, or brown pus in the wound     · Red, tender, or warm area around the pus    · Fever  Seek care immediately if:   · You have pus or a foul odor coming from the wound  · You have sudden trouble breathing or chest pain  · Blood soaks through your bandage  Contact your healthcare provider if:   · You have muscle, joint, or body aches, sweating, or a fever  · You have more swelling, redness, or bleeding in your wound  · Your skin is itchy, swollen, or you have a rash  · You have questions or concerns about your condition or care  Treatment for an acute wound  may include any of the following:  · Cleansing  is done with soap and water to wash away germs and decrease the risk of infection  Sterile water further cleans the wound  The cleaning is done under high pressure with a catheter tip and large syringe  A solution that kills germs may also be used  · Debridement  is done to clean and remove objects, dirt, or dead tissues from the open wound  Healthcare providers may also drain the wound to clean out pus  · Closure of the wound  is done with stitches, staples, skin adhesive, or other treatments  This may be done if the wound is wide or deep  Stitches may be needed if the wound is in an area that moves a lot, such as the hands, feet, and joints  Stitches may help to keep the wound from getting infected  They may also decrease the amount of scarring you have  Some wounds may heal better without stitches    Wound care:   · If your wound was closed with thin strips of medical tape, keep them clean and dry  The strips of medical tape will fall off on their own  Do not pull them off  · Keep the bandage clean and dry  Do not remove the bandage over your wound unless your healthcare provider says it is okay  · Wash your hands before and after you take care of your wound to prevent infection  · Clean the wound as directed  If you cannot reach the wound, have someone help you  · If you have packing, make sure all the gauze used to pack the wound is taken out and replaced as directed  Keep track of how many gauze dressings are placed inside the wound  Follow up with your healthcare provider as directed:  Write down your questions so you remember to ask them during your visits  © 2016 [employer] is for End User's use only and may not be sold, redistributed or otherwise used for commercial purposes  All illustrations and images included in CareNotes® are the copyrighted property of A D A M [employer]  or Jw [employer]  The above information is an  only  It is not intended as medical advice for individual conditions or treatments  Talk to your doctor, nurse or pharmacist before following any medical regimen to see if it is safe and effective for you

## 2018-04-30 ENCOUNTER — TRANSCRIBE ORDERS (OUTPATIENT)
Dept: ADMINISTRATIVE | Facility: HOSPITAL | Age: 48
End: 2018-04-30

## 2018-04-30 DIAGNOSIS — Z09 FOLLOW UP: Primary | ICD-10-CM

## 2018-05-19 ENCOUNTER — APPOINTMENT (OUTPATIENT)
Dept: LAB | Facility: HOSPITAL | Age: 48
End: 2018-05-19
Payer: COMMERCIAL

## 2018-05-19 ENCOUNTER — TRANSCRIBE ORDERS (OUTPATIENT)
Dept: ADMINISTRATIVE | Facility: HOSPITAL | Age: 48
End: 2018-05-19

## 2018-05-19 DIAGNOSIS — Z94.0 KIDNEY REPLACED BY TRANSPLANT: Primary | ICD-10-CM

## 2018-05-19 DIAGNOSIS — D63.1 ANEMIA OF CHRONIC RENAL FAILURE, UNSPECIFIED CKD STAGE: ICD-10-CM

## 2018-05-19 DIAGNOSIS — N25.0 RENAL BONE DISEASE: ICD-10-CM

## 2018-05-19 DIAGNOSIS — N18.9 ANEMIA OF CHRONIC RENAL FAILURE, UNSPECIFIED CKD STAGE: ICD-10-CM

## 2018-05-19 DIAGNOSIS — Z94.0 KIDNEY REPLACED BY TRANSPLANT: ICD-10-CM

## 2018-05-19 LAB
ALBUMIN SERPL BCP-MCNC: 2.6 G/DL (ref 3.5–5)
ALP SERPL-CCNC: 396 U/L (ref 46–116)
ALT SERPL W P-5'-P-CCNC: 30 U/L (ref 12–78)
ANION GAP SERPL CALCULATED.3IONS-SCNC: 13 MMOL/L (ref 4–13)
ANISOCYTOSIS BLD QL SMEAR: PRESENT
AST SERPL W P-5'-P-CCNC: 22 U/L (ref 5–45)
BASOPHILS # BLD MANUAL: 0 THOUSAND/UL (ref 0–0.1)
BASOPHILS NFR MAR MANUAL: 0 % (ref 0–1)
BILIRUB SERPL-MCNC: 0.4 MG/DL (ref 0.2–1)
BUN SERPL-MCNC: 49 MG/DL (ref 5–25)
CALCIUM SERPL-MCNC: 6.8 MG/DL (ref 8.3–10.1)
CHLORIDE SERPL-SCNC: 107 MMOL/L (ref 100–108)
CO2 SERPL-SCNC: 18 MMOL/L (ref 21–32)
CREAT SERPL-MCNC: 2.36 MG/DL (ref 0.6–1.3)
EOSINOPHIL # BLD MANUAL: 0.34 THOUSAND/UL (ref 0–0.4)
EOSINOPHIL NFR BLD MANUAL: 4 % (ref 0–6)
ERYTHROCYTE [DISTWIDTH] IN BLOOD BY AUTOMATED COUNT: 16 % (ref 11.6–15.1)
FOLATE SERPL-MCNC: 19.1 NG/ML (ref 3.1–17.5)
GFR SERPL CREATININE-BSD FRML MDRD: 31 ML/MIN/1.73SQ M
GLUCOSE P FAST SERPL-MCNC: 112 MG/DL (ref 65–99)
HCT VFR BLD AUTO: 29.3 % (ref 36.5–49.3)
HGB BLD-MCNC: 9.6 G/DL (ref 12–17)
IRON SATN MFR SERPL: 31 %
IRON SERPL-MCNC: 63 UG/DL (ref 65–175)
LYMPHOCYTES # BLD AUTO: 2.11 THOUSAND/UL (ref 0.6–4.47)
LYMPHOCYTES # BLD AUTO: 25 % (ref 14–44)
MCH RBC QN AUTO: 30.3 PG (ref 26.8–34.3)
MCHC RBC AUTO-ENTMCNC: 32.8 G/DL (ref 31.4–37.4)
MCV RBC AUTO: 92 FL (ref 82–98)
MICROCYTES BLD QL AUTO: PRESENT
MONOCYTES # BLD AUTO: 0.25 THOUSAND/UL (ref 0–1.22)
MONOCYTES NFR BLD: 3 % (ref 4–12)
NEUTROPHILS # BLD MANUAL: 5.74 THOUSAND/UL (ref 1.85–7.62)
NEUTS BAND NFR BLD MANUAL: 2 % (ref 0–8)
NEUTS SEG NFR BLD AUTO: 66 % (ref 43–75)
PHOSPHATE SERPL-MCNC: 5.5 MG/DL (ref 2.7–4.5)
PLATELET # BLD AUTO: 313 THOUSANDS/UL (ref 149–390)
PLATELET BLD QL SMEAR: ADEQUATE
PMV BLD AUTO: 9 FL (ref 8.9–12.7)
POTASSIUM SERPL-SCNC: 3.9 MMOL/L (ref 3.5–5.3)
PROT SERPL-MCNC: 6.8 G/DL (ref 6.4–8.2)
PTH-INTACT SERPL-MCNC: 1223.8 PG/ML (ref 18.4–80.1)
RBC # BLD AUTO: 3.17 MILLION/UL (ref 3.88–5.62)
SODIUM SERPL-SCNC: 138 MMOL/L (ref 136–145)
TACROLIMUS BLD-MCNC: 4 NG/ML (ref 2–20)
TIBC SERPL-MCNC: 203 UG/DL (ref 250–450)
TOTAL CELLS COUNTED SPEC: 100
TRANSFERRIN SERPL-MCNC: 164 MG/DL (ref 200–400)
VIT B12 SERPL-MCNC: 398 PG/ML (ref 100–900)
WBC # BLD AUTO: 8.44 THOUSAND/UL (ref 4.31–10.16)

## 2018-05-19 PROCEDURE — 36415 COLL VENOUS BLD VENIPUNCTURE: CPT

## 2018-05-19 PROCEDURE — 85007 BL SMEAR W/DIFF WBC COUNT: CPT

## 2018-05-19 PROCEDURE — 85027 COMPLETE CBC AUTOMATED: CPT

## 2018-05-19 PROCEDURE — 82607 VITAMIN B-12: CPT

## 2018-05-19 PROCEDURE — 83550 IRON BINDING TEST: CPT

## 2018-05-19 PROCEDURE — 80053 COMPREHEN METABOLIC PANEL: CPT

## 2018-05-19 PROCEDURE — 82746 ASSAY OF FOLIC ACID SERUM: CPT

## 2018-05-19 PROCEDURE — 80197 ASSAY OF TACROLIMUS: CPT

## 2018-05-19 PROCEDURE — 83970 ASSAY OF PARATHORMONE: CPT

## 2018-05-19 PROCEDURE — 84466 ASSAY OF TRANSFERRIN: CPT

## 2018-05-19 PROCEDURE — 84100 ASSAY OF PHOSPHORUS: CPT

## 2018-05-19 PROCEDURE — 83540 ASSAY OF IRON: CPT

## 2018-05-25 ENCOUNTER — HOSPITAL ENCOUNTER (OUTPATIENT)
Dept: NON INVASIVE DIAGNOSTICS | Facility: HOSPITAL | Age: 48
Discharge: HOME | End: 2018-05-25
Payer: COMMERCIAL

## 2018-05-25 DIAGNOSIS — Z09 FOLLOW UP: ICD-10-CM

## 2018-05-25 PROCEDURE — 93306 TTE W/DOPPLER COMPLETE: CPT

## 2018-05-25 PROCEDURE — 93306 TTE W/DOPPLER COMPLETE: CPT | Performed by: INTERNAL MEDICINE

## 2018-05-29 DIAGNOSIS — E10.21 TYPE 1 DIABETES MELLITUS WITH NEPHROPATHY (HCC): Primary | ICD-10-CM

## 2018-05-30 DIAGNOSIS — E10.21 TYPE 1 DIABETES MELLITUS WITH NEPHROPATHY (HCC): ICD-10-CM

## 2018-06-23 ENCOUNTER — TRANSCRIBE ORDERS (OUTPATIENT)
Dept: ADMINISTRATIVE | Facility: HOSPITAL | Age: 48
End: 2018-06-23

## 2018-06-23 ENCOUNTER — APPOINTMENT (OUTPATIENT)
Dept: LAB | Facility: HOSPITAL | Age: 48
End: 2018-06-23

## 2018-06-23 ENCOUNTER — APPOINTMENT (OUTPATIENT)
Dept: LAB | Facility: HOSPITAL | Age: 48
End: 2018-06-23
Payer: COMMERCIAL

## 2018-06-23 DIAGNOSIS — Z00.8 HEALTH EXAMINATION IN POPULATION SURVEY: ICD-10-CM

## 2018-06-23 DIAGNOSIS — Z94.0 KIDNEY REPLACED BY TRANSPLANT: Primary | ICD-10-CM

## 2018-06-23 DIAGNOSIS — Z00.8 HEALTH EXAMINATION IN POPULATION SURVEY: Primary | ICD-10-CM

## 2018-06-23 DIAGNOSIS — Z94.0 KIDNEY REPLACED BY TRANSPLANT: ICD-10-CM

## 2018-06-23 LAB
CHOLEST SERPL-MCNC: 106 MG/DL (ref 50–200)
EST. AVERAGE GLUCOSE BLD GHB EST-MCNC: 163 MG/DL
HBA1C MFR BLD: 7.3 % (ref 4.2–6.3)
HDLC SERPL-MCNC: 41 MG/DL (ref 40–60)
LDLC SERPL CALC-MCNC: 41 MG/DL (ref 0–100)
NONHDLC SERPL-MCNC: 65 MG/DL
PTH-INTACT SERPL-MCNC: 1147.9 PG/ML (ref 18.4–80.1)
TRIGL SERPL-MCNC: 120 MG/DL

## 2018-06-23 PROCEDURE — 36415 COLL VENOUS BLD VENIPUNCTURE: CPT

## 2018-06-23 PROCEDURE — 83036 HEMOGLOBIN GLYCOSYLATED A1C: CPT

## 2018-06-23 PROCEDURE — 82955 ASSAY OF G6PD ENZYME: CPT

## 2018-06-23 PROCEDURE — 80061 LIPID PANEL: CPT

## 2018-06-23 PROCEDURE — 83970 ASSAY OF PARATHORMONE: CPT

## 2018-06-26 LAB
G6PD BLD QN: 301 U/10E12 RBC (ref 146–376)
RBC # BLD AUTO: 2.96 X10E6/UL (ref 4.14–5.8)

## 2018-06-28 ENCOUNTER — TRANSCRIBE ORDERS (OUTPATIENT)
Dept: ADMINISTRATIVE | Facility: HOSPITAL | Age: 48
End: 2018-06-28

## 2018-06-28 DIAGNOSIS — M14.671 CHARCOT'S JOINT OF ANKLE, RIGHT: Primary | ICD-10-CM

## 2018-07-03 ENCOUNTER — HOSPITAL ENCOUNTER (OUTPATIENT)
Dept: CT IMAGING | Facility: HOSPITAL | Age: 48
Discharge: HOME | End: 2018-07-03
Payer: COMMERCIAL

## 2018-07-03 DIAGNOSIS — M14.671 CHARCOT'S JOINT OF ANKLE, RIGHT: ICD-10-CM

## 2018-07-03 PROCEDURE — 73700 CT LOWER EXTREMITY W/O DYE: CPT

## 2018-07-19 ENCOUNTER — OFFICE VISIT (OUTPATIENT)
Dept: FAMILY MEDICINE CLINIC | Facility: CLINIC | Age: 48
End: 2018-07-19
Payer: COMMERCIAL

## 2018-07-19 VITALS
DIASTOLIC BLOOD PRESSURE: 70 MMHG | WEIGHT: 122 LBS | BODY MASS INDEX: 20.83 KG/M2 | SYSTOLIC BLOOD PRESSURE: 158 MMHG | HEIGHT: 64 IN

## 2018-07-19 DIAGNOSIS — Z94.0 RENAL TRANSPLANT, STATUS POST: ICD-10-CM

## 2018-07-19 DIAGNOSIS — N18.4 CHRONIC KIDNEY DISEASE, STAGE IV (SEVERE) (HCC): ICD-10-CM

## 2018-07-19 DIAGNOSIS — I15.1 HYPERTENSION SECONDARY TO OTHER RENAL DISORDERS: ICD-10-CM

## 2018-07-19 DIAGNOSIS — M86.669: ICD-10-CM

## 2018-07-19 DIAGNOSIS — N28.89 HYPERTENSION SECONDARY TO OTHER RENAL DISORDERS: ICD-10-CM

## 2018-07-19 DIAGNOSIS — E10.22 TYPE 1 DIABETES MELLITUS WITH DIABETIC CHRONIC KIDNEY DISEASE, UNSPECIFIED CKD STAGE (HCC): ICD-10-CM

## 2018-07-19 DIAGNOSIS — Z00.00 MEDICARE ANNUAL WELLNESS VISIT, SUBSEQUENT: Primary | ICD-10-CM

## 2018-07-19 PROCEDURE — 99396 PREV VISIT EST AGE 40-64: CPT | Performed by: FAMILY MEDICINE

## 2018-07-19 NOTE — PROGRESS NOTES
Assessment/Plan:    No problem-specific Assessment & Plan notes found for this encounter  Diagnoses and all orders for this visit:    Medicare annual wellness visit, subsequent          Subjective:      Patient ID: Daya Novak is a 49 y o  male  Annual Medicare well visit for 26-year-old patient of who has Medicare benefits because of kidney failure since last visit patient has had a cardiac arrest resuscitation and subsequent cardiac studies he follows with [facility] [address] [facility] and has is doing well in that regard he sees his cardiologist every 6 months        The following portions of the patient's history were reviewed and updated as appropriate:   He  has a past medical history of Cardiac arrest ([facility] ); Diabetes mellitus ([facility] ); Diabetes mellitus type 1 ([facility] ); Hypertension; Infection at site of external fixator pin [facility]); MI (myocardial infarction) ([facility] ); Pneumonia; Renal failure; and Renal transplant, status post (07/21/2007)    He   Patient Active Problem List    Diagnosis Date Noted    Urinary retention 09/24/2017    Abnormal x-ray 09/24/2017    Chronic kidney disease, stage IV (severe) ([facility] ) 09/23/2017    Chronic osteomyelitis of tibia ([facility] ) 09/23/2017    DKA (diabetic ketoacidoses) ([facility] ) 09/23/2017    Elevated troponin 09/23/2017    Diarrhea 09/23/2017    Elevated INR 09/23/2017    Cellulitis of ankle 09/23/2017    Non-ST elevation myocardial infarction (NSTEMI), type 2 09/23/2017    Elevated platelet count 58/11/5392    Closed fracture of distal end of right tibia with routine healing 07/03/2017    Osteomyel[facility] ) 12/07/2016    Poor circulation 12/07/2016    MIKE (acute kidney injury) ([facility] ) 10/27/2016    Osteomyelitis ([facility] ) 10/26/2016    Foot pain 10/26/2016    Hypertension 10/26/2016    Type 1 diabetes mellitus ([facility] ) 10/26/2016    Renal transplant, status post 10/26/2016    Hyperkalemia 10/26/2016    Immunosuppression ([facility] ) 11/04/2014    Hypertension 08/04/2010 He  has a past surgical history that includes Nephrectomy transplanted organ; GLUTAMIC ACID DECARBOXYLASE (HISTORICAL); Eye surgery; Toe amputation (Right, 10/27/2016); pr open treatment fracture distal tibia fibula (Right, 7/3/2017); CLOSED REDUCTION ANKLE (Right, 7/3/2017); Ankle fracture surgery; Ankle hardware removal (Right, 7/31/2017); Ankle hardware removal (Right, 8/17/2017); Fracture surgery; and Cardiac surgery  His family history includes Coronary artery disease in his mother; Diabetes in his brother  He  reports that he has never smoked  He has never used smokeless tobacco  He reports that he does not drink alcohol or use drugs  Current Outpatient Prescriptions   Medication Sig Dispense Refill    aspirin (ECOTRIN LOW STRENGTH) 81 mg EC tablet Take 81 mg by mouth daily      atorvastatin (LIPITOR) 40 mg tablet Take 40 mg by mouth daily      azaTHIOprine (IMURAN) 50 mg tablet Take 50 mg by mouth daily      calcitriol (ROCALTROL) 0 5 MCG capsule Take 0 5 mcg by mouth daily      insulin detemir (LEVEMIR) 100 units/mL subcutaneous injection Inject 10 Units under the skin 2 (two) times a day      insulin lispro (HUMALOG) 100 units/mL injection Inject 30 Units under the skin daily 30 mL 5    Lactobacillus (ACIDOPHILUS PO) Take 300 mg by mouth      metoprolol succinate (TOPROL-XL) 25 mg 24 hr tablet Take 25 mg by mouth daily      multivitamin (THERAGRAN) TABS Take 1 tablet by mouth daily      predniSONE 5 mg tablet Take 5 mg by mouth daily      sodium bicarbonate 650 mg tablet Take 1 tablet by mouth 2 (two) times a day 30 tablet 0    tacrolimus (PROGRAF) 0 5 mg capsule Take 0 5 mg by mouth daily In the AM and 1mg hs        No current facility-administered medications for this visit        Current Outpatient Prescriptions on File Prior to Visit   Medication Sig    aspirin (ECOTRIN LOW STRENGTH) 81 mg EC tablet Take 81 mg by mouth daily    atorvastatin (LIPITOR) 40 mg tablet Take 40 mg by mouth daily    azaTHIOprine (IMURAN) 50 mg tablet Take 50 mg by mouth daily    calcitriol (ROCALTROL) 0 5 MCG capsule Take 0 5 mcg by mouth daily    insulin detemir (LEVEMIR) 100 units/mL subcutaneous injection Inject 10 Units under the skin 2 (two) times a day    insulin lispro (HUMALOG) 100 units/mL injection Inject 30 Units under the skin daily    Lactobacillus (ACIDOPHILUS PO) Take 300 mg by mouth    metoprolol succinate (TOPROL-XL) 25 mg 24 hr tablet Take 25 mg by mouth daily    multivitamin (THERAGRAN) TABS Take 1 tablet by mouth daily    predniSONE 5 mg tablet Take 5 mg by mouth daily    sodium bicarbonate 650 mg tablet Take 1 tablet by mouth 2 (two) times a day    tacrolimus (PROGRAF) 0 5 mg capsule Take 0 5 mg by mouth daily In the AM and 1mg hs     [DISCONTINUED] calcium acetate (PHOSLO) 667 mg capsule Take 667 mg by mouth 3 (three) times a day with meals    [DISCONTINUED] clindamycin (CLEOCIN) 300 MG capsule Take 300 mg by mouth 4 (four) times a day    [DISCONTINUED] Ferric Citrate (AURYXIA) 1  MG(Fe) TABS Take by mouth    [DISCONTINUED] mycophenolate (CELLCEPT) 250 mg capsule Take 1,000 mg by mouth every 12 (twelve) hours 3 tabs     No current facility-administered medications on file prior to visit  He has No Known Allergies       Review of Systems   Constitutional: Positive for activity change and fatigue  Negative for appetite change, diaphoresis and fever  HENT: Negative  Eyes: Negative  Patient has visual disturbances secondary to retinopathy from diabetes   Respiratory: Negative for apnea, cough, chest tightness, shortness of breath and wheezing  Cardiovascular: Negative for chest pain, palpitations and leg swelling  Recent cardiac arrest with resuscitation   Gastrointestinal: Positive for diarrhea  Negative for abdominal distention, abdominal pain, anal bleeding, constipation, nausea and vomiting  Endocrine: Positive for cold intolerance   Negative for heat intolerance, polydipsia, polyphagia and polyuria  Secondary hyperparathyroidism   Genitourinary: Negative for difficulty urinating, dysuria, flank pain, hematuria and urgency  Musculoskeletal: Positive for arthralgias  Negative for back pain, gait problem, joint swelling and myalgias  Patient has chronic nonhealing fracture of right foot and is currently scheduled for another surgery   Skin: Negative for color change, rash and wound  Allergic/Immunologic: Negative for environmental allergies, food allergies and immunocompromised state  Neurological: Positive for weakness  Negative for dizziness, seizures, syncope, speech difficulty, numbness and headaches  Hematological: Negative for adenopathy  Does not bruise/bleed easily  Psychiatric/Behavioral: Negative for agitation, behavioral problems, hallucinations, sleep disturbance and suicidal ideas  Objective:      /70 (BP Location: Left arm, Patient Position: Sitting, Cuff Size: Standard)   Ht 5' 4" (1 626 m)   Wt 55 3 kg (122 lb)   BMI 20 94 kg/m²          Physical Exam   Constitutional: He is oriented to person, place, and time  He appears well-developed and well-nourished  No distress  HENT:   Head: Normocephalic  Right Ear: External ear normal    Left Ear: External ear normal    Nose: Nose normal    Mouth/Throat: Oropharynx is clear and moist    Eyes: Conjunctivae and EOM are normal  Pupils are equal, round, and reactive to light  Right eye exhibits no discharge  Left eye exhibits no discharge  No scleral icterus  Neck: Normal range of motion  No tracheal deviation present  No thyromegaly present  Cardiovascular: Normal rate, regular rhythm and normal heart sounds  Exam reveals no gallop and no friction rub  No murmur heard  Pulmonary/Chest: Effort normal and breath sounds normal  No respiratory distress  He has no wheezes  Abdominal: Soft  Bowel sounds are normal  He exhibits no mass   There is no tenderness  There is no guarding  Musculoskeletal: He exhibits no edema or deformity  Patient has a Cam walking boot on the right foot   Lymphadenopathy:     He has no cervical adenopathy  Neurological: He is alert and oriented to person, place, and time  No cranial nerve deficit  Skin: Skin is warm and dry  No rash noted  He is not diaphoretic  No erythema  Psychiatric: He has a normal mood and affect   Thought content normal

## 2018-08-14 ENCOUNTER — PATIENT OUTREACH (OUTPATIENT)
Dept: FAMILY MEDICINE CLINIC | Facility: CLINIC | Age: 48
End: 2018-08-14

## 2018-08-14 NOTE — PROGRESS NOTES
Called to introduce self and discuss care management / assess services needed and support services pt has in place  spoke with pt who reports he is currently at [place] [id number] post ankle surgery  to be discharged at 2 PM today self care  pt report [facility]  Reports he is Currently active with 2 doctors- ortho at [place] and [facility] [place] and he has no problems with getting appts  supportive family that helps with transportation  declines CM but will keep contact info if needs change and also knows can reach CM thru pcp office

## 2018-08-16 ENCOUNTER — TRANSITIONAL CARE MANAGEMENT (OUTPATIENT)
Dept: FAMILY MEDICINE CLINIC | Facility: CLINIC | Age: 48
End: 2018-08-16

## 2018-08-20 ENCOUNTER — HOSPITAL ENCOUNTER (OUTPATIENT)
Dept: RADIOLOGY | Facility: HOSPITAL | Age: 48
Discharge: HOME | End: 2018-08-20
Attending: RADIOLOGY
Payer: COMMERCIAL

## 2018-08-20 DIAGNOSIS — Z79.2 ANTIBIOTIC LONG-TERM USE: ICD-10-CM

## 2018-08-20 PROCEDURE — C1751 CATH, INF, PER/CENT/MIDLINE: HCPCS

## 2018-08-20 PROCEDURE — 77001 FLUOROGUIDE FOR VEIN DEVICE: CPT | Performed by: RADIOLOGY

## 2018-08-20 PROCEDURE — 77001 FLUOROGUIDE FOR VEIN DEVICE: CPT

## 2018-08-20 PROCEDURE — 36569 INSJ PICC 5 YR+ W/O IMAGING: CPT

## 2018-08-20 PROCEDURE — C1894 INTRO/SHEATH, NON-LASER: HCPCS

## 2018-08-20 PROCEDURE — 76937 US GUIDE VASCULAR ACCESS: CPT

## 2018-08-20 PROCEDURE — 36558 INSERT TUNNELED CV CATH: CPT | Performed by: RADIOLOGY

## 2018-08-20 RX ORDER — LIDOCAINE HYDROCHLORIDE 10 MG/ML
10 INJECTION, SOLUTION INFILTRATION; PERINEURAL ONCE
Status: COMPLETED | OUTPATIENT
Start: 2018-08-20 | End: 2018-08-20

## 2018-08-20 RX ADMIN — LIDOCAINE HYDROCHLORIDE 10 ML: 10 INJECTION, SOLUTION INFILTRATION; PERINEURAL at 09:50

## 2018-08-24 ENCOUNTER — APPOINTMENT (OUTPATIENT)
Dept: LAB | Facility: HOSPITAL | Age: 48
End: 2018-08-24
Payer: COMMERCIAL

## 2018-08-24 ENCOUNTER — TRANSCRIBE ORDERS (OUTPATIENT)
Dept: ADMINISTRATIVE | Facility: HOSPITAL | Age: 48
End: 2018-08-24

## 2018-08-24 DIAGNOSIS — M86.10 ACUTE OSTEOMYELITIS (HCC): ICD-10-CM

## 2018-08-24 DIAGNOSIS — M86.10 ACUTE OSTEOMYELITIS (HCC): Primary | ICD-10-CM

## 2018-08-24 LAB
ALBUMIN SERPL BCP-MCNC: 2.7 G/DL (ref 3.5–5)
ALP SERPL-CCNC: 343 U/L (ref 46–116)
ALT SERPL W P-5'-P-CCNC: 25 U/L (ref 12–78)
ANION GAP SERPL CALCULATED.3IONS-SCNC: 8 MMOL/L (ref 4–13)
AST SERPL W P-5'-P-CCNC: 19 U/L (ref 5–45)
BASOPHILS # BLD AUTO: 0.03 THOUSANDS/ΜL (ref 0–0.1)
BASOPHILS NFR BLD AUTO: 0 % (ref 0–1)
BILIRUB SERPL-MCNC: 0.3 MG/DL (ref 0.2–1)
BUN SERPL-MCNC: 53 MG/DL (ref 5–25)
CALCIUM SERPL-MCNC: 7.7 MG/DL (ref 8.3–10.1)
CHLORIDE SERPL-SCNC: 110 MMOL/L (ref 100–108)
CO2 SERPL-SCNC: 22 MMOL/L (ref 21–32)
CREAT SERPL-MCNC: 2.59 MG/DL (ref 0.6–1.3)
EOSINOPHIL # BLD AUTO: 0.34 THOUSAND/ΜL (ref 0–0.61)
EOSINOPHIL NFR BLD AUTO: 5 % (ref 0–6)
ERYTHROCYTE [DISTWIDTH] IN BLOOD BY AUTOMATED COUNT: 14.8 % (ref 11.6–15.1)
GFR SERPL CREATININE-BSD FRML MDRD: 28 ML/MIN/1.73SQ M
GLUCOSE SERPL-MCNC: 114 MG/DL (ref 65–140)
HCT VFR BLD AUTO: 28.2 % (ref 36.5–49.3)
HGB BLD-MCNC: 8.7 G/DL (ref 12–17)
IMM GRANULOCYTES # BLD AUTO: 0.02 THOUSAND/UL (ref 0–0.2)
IMM GRANULOCYTES NFR BLD AUTO: 0 % (ref 0–2)
LYMPHOCYTES # BLD AUTO: 1.57 THOUSANDS/ΜL (ref 0.6–4.47)
LYMPHOCYTES NFR BLD AUTO: 22 % (ref 14–44)
MCH RBC QN AUTO: 28.8 PG (ref 26.8–34.3)
MCHC RBC AUTO-ENTMCNC: 30.9 G/DL (ref 31.4–37.4)
MCV RBC AUTO: 93 FL (ref 82–98)
MONOCYTES # BLD AUTO: 0.46 THOUSAND/ΜL (ref 0.17–1.22)
MONOCYTES NFR BLD AUTO: 6 % (ref 4–12)
NEUTROPHILS # BLD AUTO: 4.78 THOUSANDS/ΜL (ref 1.85–7.62)
NEUTS SEG NFR BLD AUTO: 67 % (ref 43–75)
NRBC BLD AUTO-RTO: 0 /100 WBCS
PLATELET # BLD AUTO: 367 THOUSANDS/UL (ref 149–390)
PMV BLD AUTO: 8.1 FL (ref 8.9–12.7)
POTASSIUM SERPL-SCNC: 5.4 MMOL/L (ref 3.5–5.3)
PROT SERPL-MCNC: 6.6 G/DL (ref 6.4–8.2)
RBC # BLD AUTO: 3.02 MILLION/UL (ref 3.88–5.62)
SODIUM SERPL-SCNC: 140 MMOL/L (ref 136–145)
VANCOMYCIN TROUGH SERPL-MCNC: 19.2 UG/ML (ref 10–20)
WBC # BLD AUTO: 7.2 THOUSAND/UL (ref 4.31–10.16)

## 2018-08-24 PROCEDURE — 85025 COMPLETE CBC W/AUTO DIFF WBC: CPT

## 2018-08-24 PROCEDURE — 80053 COMPREHEN METABOLIC PANEL: CPT

## 2018-08-24 PROCEDURE — 80202 ASSAY OF VANCOMYCIN: CPT

## 2018-08-24 PROCEDURE — 36415 COLL VENOUS BLD VENIPUNCTURE: CPT

## 2018-08-28 ENCOUNTER — APPOINTMENT (OUTPATIENT)
Dept: LAB | Facility: HOSPITAL | Age: 48
End: 2018-08-28
Payer: COMMERCIAL

## 2018-08-28 ENCOUNTER — TRANSCRIBE ORDERS (OUTPATIENT)
Dept: ADMINISTRATIVE | Facility: HOSPITAL | Age: 48
End: 2018-08-28

## 2018-08-28 DIAGNOSIS — M86.10 ACUTE OSTEOMYELITIS (HCC): Primary | ICD-10-CM

## 2018-08-28 DIAGNOSIS — M86.10 ACUTE OSTEOMYELITIS (HCC): ICD-10-CM

## 2018-08-28 LAB
ALBUMIN SERPL BCP-MCNC: 2.6 G/DL (ref 3.5–5)
ALP SERPL-CCNC: 356 U/L (ref 46–116)
ALT SERPL W P-5'-P-CCNC: 25 U/L (ref 12–78)
ANION GAP SERPL CALCULATED.3IONS-SCNC: 11 MMOL/L (ref 4–13)
AST SERPL W P-5'-P-CCNC: 23 U/L (ref 5–45)
BASOPHILS # BLD AUTO: 0.04 THOUSANDS/ΜL (ref 0–0.1)
BASOPHILS NFR BLD AUTO: 1 % (ref 0–1)
BILIRUB SERPL-MCNC: 0.5 MG/DL (ref 0.2–1)
BUN SERPL-MCNC: 54 MG/DL (ref 5–25)
CALCIUM SERPL-MCNC: 7.6 MG/DL (ref 8.3–10.1)
CHLORIDE SERPL-SCNC: 104 MMOL/L (ref 100–108)
CO2 SERPL-SCNC: 18 MMOL/L (ref 21–32)
CREAT SERPL-MCNC: 2.78 MG/DL (ref 0.6–1.3)
EOSINOPHIL # BLD AUTO: 0.4 THOUSAND/ΜL (ref 0–0.61)
EOSINOPHIL NFR BLD AUTO: 6 % (ref 0–6)
ERYTHROCYTE [DISTWIDTH] IN BLOOD BY AUTOMATED COUNT: 15 % (ref 11.6–15.1)
GFR SERPL CREATININE-BSD FRML MDRD: 26 ML/MIN/1.73SQ M
GLUCOSE SERPL-MCNC: 409 MG/DL (ref 65–140)
HCT VFR BLD AUTO: 27.7 % (ref 36.5–49.3)
HGB BLD-MCNC: 8.5 G/DL (ref 12–17)
IMM GRANULOCYTES # BLD AUTO: 0.02 THOUSAND/UL (ref 0–0.2)
IMM GRANULOCYTES NFR BLD AUTO: 0 % (ref 0–2)
LYMPHOCYTES # BLD AUTO: 1.75 THOUSANDS/ΜL (ref 0.6–4.47)
LYMPHOCYTES NFR BLD AUTO: 27 % (ref 14–44)
MCH RBC QN AUTO: 28.8 PG (ref 26.8–34.3)
MCHC RBC AUTO-ENTMCNC: 30.7 G/DL (ref 31.4–37.4)
MCV RBC AUTO: 94 FL (ref 82–98)
MONOCYTES # BLD AUTO: 0.63 THOUSAND/ΜL (ref 0.17–1.22)
MONOCYTES NFR BLD AUTO: 10 % (ref 4–12)
NEUTROPHILS # BLD AUTO: 3.68 THOUSANDS/ΜL (ref 1.85–7.62)
NEUTS SEG NFR BLD AUTO: 56 % (ref 43–75)
NRBC BLD AUTO-RTO: 0 /100 WBCS
PLATELET # BLD AUTO: 333 THOUSANDS/UL (ref 149–390)
PMV BLD AUTO: 8.6 FL (ref 8.9–12.7)
POTASSIUM SERPL-SCNC: 5.5 MMOL/L (ref 3.5–5.3)
PROT SERPL-MCNC: 6.9 G/DL (ref 6.4–8.2)
RBC # BLD AUTO: 2.95 MILLION/UL (ref 3.88–5.62)
SODIUM SERPL-SCNC: 133 MMOL/L (ref 136–145)
VANCOMYCIN TROUGH SERPL-MCNC: 22.8 UG/ML (ref 10–20)
WBC # BLD AUTO: 6.52 THOUSAND/UL (ref 4.31–10.16)

## 2018-08-28 PROCEDURE — 80053 COMPREHEN METABOLIC PANEL: CPT

## 2018-08-28 PROCEDURE — 36415 COLL VENOUS BLD VENIPUNCTURE: CPT

## 2018-08-28 PROCEDURE — 80202 ASSAY OF VANCOMYCIN: CPT

## 2018-08-28 PROCEDURE — 85025 COMPLETE CBC W/AUTO DIFF WBC: CPT

## 2018-08-31 ENCOUNTER — TRANSCRIBE ORDERS (OUTPATIENT)
Dept: ADMINISTRATIVE | Facility: HOSPITAL | Age: 48
End: 2018-08-31

## 2018-08-31 ENCOUNTER — APPOINTMENT (OUTPATIENT)
Dept: LAB | Facility: HOSPITAL | Age: 48
End: 2018-08-31
Payer: COMMERCIAL

## 2018-08-31 DIAGNOSIS — M86.10 OSTEOMYELITIS, ACUTE (HCC): Primary | ICD-10-CM

## 2018-08-31 DIAGNOSIS — M86.10 OSTEOMYELITIS, ACUTE (HCC): ICD-10-CM

## 2018-08-31 LAB
ALBUMIN SERPL BCP-MCNC: 2.5 G/DL (ref 3.5–5)
ALP SERPL-CCNC: 311 U/L (ref 46–116)
ALT SERPL W P-5'-P-CCNC: 26 U/L (ref 12–78)
ANION GAP SERPL CALCULATED.3IONS-SCNC: 12 MMOL/L (ref 4–13)
AST SERPL W P-5'-P-CCNC: 23 U/L (ref 5–45)
BASOPHILS # BLD AUTO: 0.04 THOUSANDS/ΜL (ref 0–0.1)
BASOPHILS NFR BLD AUTO: 1 % (ref 0–1)
BILIRUB SERPL-MCNC: 0.4 MG/DL (ref 0.2–1)
BUN SERPL-MCNC: 47 MG/DL (ref 5–25)
CALCIUM SERPL-MCNC: 7.7 MG/DL (ref 8.3–10.1)
CHLORIDE SERPL-SCNC: 109 MMOL/L (ref 100–108)
CO2 SERPL-SCNC: 18 MMOL/L (ref 21–32)
CREAT SERPL-MCNC: 2.78 MG/DL (ref 0.6–1.3)
EOSINOPHIL # BLD AUTO: 0.91 THOUSAND/ΜL (ref 0–0.61)
EOSINOPHIL NFR BLD AUTO: 11 % (ref 0–6)
ERYTHROCYTE [DISTWIDTH] IN BLOOD BY AUTOMATED COUNT: 15.2 % (ref 11.6–15.1)
GFR SERPL CREATININE-BSD FRML MDRD: 26 ML/MIN/1.73SQ M
GLUCOSE SERPL-MCNC: 136 MG/DL (ref 65–140)
HCT VFR BLD AUTO: 28 % (ref 36.5–49.3)
HGB BLD-MCNC: 8.7 G/DL (ref 12–17)
IMM GRANULOCYTES # BLD AUTO: 0.02 THOUSAND/UL (ref 0–0.2)
IMM GRANULOCYTES NFR BLD AUTO: 0 % (ref 0–2)
LYMPHOCYTES # BLD AUTO: 2.27 THOUSANDS/ΜL (ref 0.6–4.47)
LYMPHOCYTES NFR BLD AUTO: 28 % (ref 14–44)
MCH RBC QN AUTO: 28.8 PG (ref 26.8–34.3)
MCHC RBC AUTO-ENTMCNC: 31.1 G/DL (ref 31.4–37.4)
MCV RBC AUTO: 93 FL (ref 82–98)
MONOCYTES # BLD AUTO: 0.7 THOUSAND/ΜL (ref 0.17–1.22)
MONOCYTES NFR BLD AUTO: 9 % (ref 4–12)
NEUTROPHILS # BLD AUTO: 4.13 THOUSANDS/ΜL (ref 1.85–7.62)
NEUTS SEG NFR BLD AUTO: 51 % (ref 43–75)
NRBC BLD AUTO-RTO: 0 /100 WBCS
PLATELET # BLD AUTO: 294 THOUSANDS/UL (ref 149–390)
PMV BLD AUTO: 8.7 FL (ref 8.9–12.7)
POTASSIUM SERPL-SCNC: 4.5 MMOL/L (ref 3.5–5.3)
PROT SERPL-MCNC: 6.3 G/DL (ref 6.4–8.2)
RBC # BLD AUTO: 3.02 MILLION/UL (ref 3.88–5.62)
SODIUM SERPL-SCNC: 139 MMOL/L (ref 136–145)
VANCOMYCIN TROUGH SERPL-MCNC: 15.8 UG/ML (ref 10–20)
WBC # BLD AUTO: 8.07 THOUSAND/UL (ref 4.31–10.16)

## 2018-08-31 PROCEDURE — 36415 COLL VENOUS BLD VENIPUNCTURE: CPT

## 2018-08-31 PROCEDURE — 85025 COMPLETE CBC W/AUTO DIFF WBC: CPT

## 2018-08-31 PROCEDURE — 80053 COMPREHEN METABOLIC PANEL: CPT

## 2018-08-31 PROCEDURE — 80202 ASSAY OF VANCOMYCIN: CPT

## 2018-09-04 ENCOUNTER — TRANSCRIBE ORDERS (OUTPATIENT)
Dept: ADMINISTRATIVE | Facility: HOSPITAL | Age: 48
End: 2018-09-04

## 2018-09-04 ENCOUNTER — APPOINTMENT (OUTPATIENT)
Dept: LAB | Facility: HOSPITAL | Age: 48
End: 2018-09-04

## 2018-09-04 DIAGNOSIS — M86.10 OSTEOMYELITIS, ACUTE (HCC): ICD-10-CM

## 2018-09-04 DIAGNOSIS — M86.10 OSTEOMYELITIS, ACUTE (HCC): Primary | ICD-10-CM

## 2018-09-04 LAB
ALBUMIN SERPL BCP-MCNC: 2.9 G/DL (ref 3.5–5)
ALP SERPL-CCNC: 296 U/L (ref 46–116)
ALT SERPL W P-5'-P-CCNC: 30 U/L (ref 12–78)
ANION GAP SERPL CALCULATED.3IONS-SCNC: 13 MMOL/L (ref 4–13)
AST SERPL W P-5'-P-CCNC: 21 U/L (ref 5–45)
BASOPHILS # BLD AUTO: 0.05 THOUSANDS/ΜL (ref 0–0.1)
BASOPHILS NFR BLD AUTO: 1 % (ref 0–1)
BILIRUB SERPL-MCNC: 0.4 MG/DL (ref 0.2–1)
BUN SERPL-MCNC: 57 MG/DL (ref 5–25)
CALCIUM SERPL-MCNC: 7.3 MG/DL (ref 8.3–10.1)
CHLORIDE SERPL-SCNC: 107 MMOL/L (ref 100–108)
CO2 SERPL-SCNC: 18 MMOL/L (ref 21–32)
CREAT SERPL-MCNC: 2.97 MG/DL (ref 0.6–1.3)
EOSINOPHIL # BLD AUTO: 0.62 THOUSAND/ΜL (ref 0–0.61)
EOSINOPHIL NFR BLD AUTO: 8 % (ref 0–6)
ERYTHROCYTE [DISTWIDTH] IN BLOOD BY AUTOMATED COUNT: 14.9 % (ref 11.6–15.1)
GFR SERPL CREATININE-BSD FRML MDRD: 24 ML/MIN/1.73SQ M
GLUCOSE SERPL-MCNC: 72 MG/DL (ref 65–140)
HCT VFR BLD AUTO: 27.8 % (ref 36.5–49.3)
HGB BLD-MCNC: 8.8 G/DL (ref 12–17)
IMM GRANULOCYTES # BLD AUTO: 0.03 THOUSAND/UL (ref 0–0.2)
IMM GRANULOCYTES NFR BLD AUTO: 0 % (ref 0–2)
LYMPHOCYTES # BLD AUTO: 2.4 THOUSANDS/ΜL (ref 0.6–4.47)
LYMPHOCYTES NFR BLD AUTO: 30 % (ref 14–44)
MCH RBC QN AUTO: 29 PG (ref 26.8–34.3)
MCHC RBC AUTO-ENTMCNC: 31.7 G/DL (ref 31.4–37.4)
MCV RBC AUTO: 92 FL (ref 82–98)
MONOCYTES # BLD AUTO: 0.71 THOUSAND/ΜL (ref 0.17–1.22)
MONOCYTES NFR BLD AUTO: 9 % (ref 4–12)
NEUTROPHILS # BLD AUTO: 4.18 THOUSANDS/ΜL (ref 1.85–7.62)
NEUTS SEG NFR BLD AUTO: 52 % (ref 43–75)
NRBC BLD AUTO-RTO: 0 /100 WBCS
PLATELET # BLD AUTO: 267 THOUSANDS/UL (ref 149–390)
PMV BLD AUTO: 8.4 FL (ref 8.9–12.7)
POTASSIUM SERPL-SCNC: 5.3 MMOL/L (ref 3.5–5.3)
PROT SERPL-MCNC: 6.7 G/DL (ref 6.4–8.2)
RBC # BLD AUTO: 3.03 MILLION/UL (ref 3.88–5.62)
SODIUM SERPL-SCNC: 138 MMOL/L (ref 136–145)
VANCOMYCIN TROUGH SERPL-MCNC: 17.3 UG/ML (ref 10–20)
WBC # BLD AUTO: 7.99 THOUSAND/UL (ref 4.31–10.16)

## 2018-09-04 PROCEDURE — 80053 COMPREHEN METABOLIC PANEL: CPT

## 2018-09-04 PROCEDURE — 36415 COLL VENOUS BLD VENIPUNCTURE: CPT

## 2018-09-04 PROCEDURE — 80202 ASSAY OF VANCOMYCIN: CPT

## 2018-09-04 PROCEDURE — 85025 COMPLETE CBC W/AUTO DIFF WBC: CPT

## 2018-09-07 ENCOUNTER — TRANSCRIBE ORDERS (OUTPATIENT)
Dept: ADMINISTRATIVE | Facility: HOSPITAL | Age: 48
End: 2018-09-07

## 2018-09-07 ENCOUNTER — APPOINTMENT (OUTPATIENT)
Dept: LAB | Facility: HOSPITAL | Age: 48
End: 2018-09-07
Payer: COMMERCIAL

## 2018-09-07 DIAGNOSIS — M86.10 ACUTE OSTEOMYELITIS (HCC): Primary | ICD-10-CM

## 2018-09-07 DIAGNOSIS — M86.10 ACUTE OSTEOMYELITIS (HCC): ICD-10-CM

## 2018-09-07 LAB
ALBUMIN SERPL BCP-MCNC: 2.7 G/DL (ref 3.5–5)
ALP SERPL-CCNC: 294 U/L (ref 46–116)
ALT SERPL W P-5'-P-CCNC: 26 U/L (ref 12–78)
ANION GAP SERPL CALCULATED.3IONS-SCNC: 12 MMOL/L (ref 4–13)
AST SERPL W P-5'-P-CCNC: 23 U/L (ref 5–45)
BASOPHILS # BLD AUTO: 0.04 THOUSANDS/ΜL (ref 0–0.1)
BASOPHILS NFR BLD AUTO: 1 % (ref 0–1)
BILIRUB SERPL-MCNC: 0.5 MG/DL (ref 0.2–1)
BUN SERPL-MCNC: 61 MG/DL (ref 5–25)
CALCIUM SERPL-MCNC: 7.9 MG/DL (ref 8.3–10.1)
CHLORIDE SERPL-SCNC: 104 MMOL/L (ref 100–108)
CO2 SERPL-SCNC: 19 MMOL/L (ref 21–32)
CREAT SERPL-MCNC: 2.96 MG/DL (ref 0.6–1.3)
EOSINOPHIL # BLD AUTO: 0.32 THOUSAND/ΜL (ref 0–0.61)
EOSINOPHIL NFR BLD AUTO: 5 % (ref 0–6)
ERYTHROCYTE [DISTWIDTH] IN BLOOD BY AUTOMATED COUNT: 15 % (ref 11.6–15.1)
GFR SERPL CREATININE-BSD FRML MDRD: 24 ML/MIN/1.73SQ M
GLUCOSE SERPL-MCNC: 132 MG/DL (ref 65–140)
HCT VFR BLD AUTO: 28.2 % (ref 36.5–49.3)
HGB BLD-MCNC: 9 G/DL (ref 12–17)
IMM GRANULOCYTES # BLD AUTO: 0.01 THOUSAND/UL (ref 0–0.2)
IMM GRANULOCYTES NFR BLD AUTO: 0 % (ref 0–2)
LYMPHOCYTES # BLD AUTO: 1.86 THOUSANDS/ΜL (ref 0.6–4.47)
LYMPHOCYTES NFR BLD AUTO: 29 % (ref 14–44)
MCH RBC QN AUTO: 29 PG (ref 26.8–34.3)
MCHC RBC AUTO-ENTMCNC: 31.9 G/DL (ref 31.4–37.4)
MCV RBC AUTO: 91 FL (ref 82–98)
MONOCYTES # BLD AUTO: 0.66 THOUSAND/ΜL (ref 0.17–1.22)
MONOCYTES NFR BLD AUTO: 10 % (ref 4–12)
NEUTROPHILS # BLD AUTO: 3.43 THOUSANDS/ΜL (ref 1.85–7.62)
NEUTS SEG NFR BLD AUTO: 55 % (ref 43–75)
NRBC BLD AUTO-RTO: 0 /100 WBCS
PLATELET # BLD AUTO: 251 THOUSANDS/UL (ref 149–390)
PMV BLD AUTO: 8.8 FL (ref 8.9–12.7)
POTASSIUM SERPL-SCNC: 5.2 MMOL/L (ref 3.5–5.3)
PROT SERPL-MCNC: 7 G/DL (ref 6.4–8.2)
RBC # BLD AUTO: 3.1 MILLION/UL (ref 3.88–5.62)
SODIUM SERPL-SCNC: 135 MMOL/L (ref 136–145)
VANCOMYCIN TROUGH SERPL-MCNC: 14.9 UG/ML (ref 10–20)
WBC # BLD AUTO: 6.32 THOUSAND/UL (ref 4.31–10.16)

## 2018-09-07 PROCEDURE — 36415 COLL VENOUS BLD VENIPUNCTURE: CPT

## 2018-09-07 PROCEDURE — 80202 ASSAY OF VANCOMYCIN: CPT

## 2018-09-07 PROCEDURE — 85025 COMPLETE CBC W/AUTO DIFF WBC: CPT

## 2018-09-07 PROCEDURE — 80053 COMPREHEN METABOLIC PANEL: CPT

## 2018-09-11 ENCOUNTER — TRANSCRIBE ORDERS (OUTPATIENT)
Dept: ADMINISTRATIVE | Facility: HOSPITAL | Age: 48
End: 2018-09-11

## 2018-09-11 ENCOUNTER — APPOINTMENT (OUTPATIENT)
Dept: LAB | Facility: HOSPITAL | Age: 48
End: 2018-09-11
Payer: COMMERCIAL

## 2018-09-11 DIAGNOSIS — M86.10 ACUTE OSTEOMYELITIS (HCC): ICD-10-CM

## 2018-09-11 DIAGNOSIS — M86.10 ACUTE OSTEOMYELITIS (HCC): Primary | ICD-10-CM

## 2018-09-11 LAB
ALBUMIN SERPL BCP-MCNC: 2.8 G/DL (ref 3.5–5)
ALP SERPL-CCNC: 300 U/L (ref 46–116)
ALT SERPL W P-5'-P-CCNC: 33 U/L (ref 12–78)
ANION GAP SERPL CALCULATED.3IONS-SCNC: 12 MMOL/L (ref 4–13)
AST SERPL W P-5'-P-CCNC: 24 U/L (ref 5–45)
BASOPHILS # BLD AUTO: 0.03 THOUSANDS/ΜL (ref 0–0.1)
BASOPHILS NFR BLD AUTO: 0 % (ref 0–1)
BILIRUB SERPL-MCNC: 0.4 MG/DL (ref 0.2–1)
BUN SERPL-MCNC: 57 MG/DL (ref 5–25)
CALCIUM SERPL-MCNC: 7.4 MG/DL (ref 8.3–10.1)
CHLORIDE SERPL-SCNC: 108 MMOL/L (ref 100–108)
CO2 SERPL-SCNC: 18 MMOL/L (ref 21–32)
CREAT SERPL-MCNC: 3.14 MG/DL (ref 0.6–1.3)
EOSINOPHIL # BLD AUTO: 0.4 THOUSAND/ΜL (ref 0–0.61)
EOSINOPHIL NFR BLD AUTO: 6 % (ref 0–6)
ERYTHROCYTE [DISTWIDTH] IN BLOOD BY AUTOMATED COUNT: 15.1 % (ref 11.6–15.1)
GFR SERPL CREATININE-BSD FRML MDRD: 22 ML/MIN/1.73SQ M
GLUCOSE SERPL-MCNC: 145 MG/DL (ref 65–140)
HCT VFR BLD AUTO: 29.1 % (ref 36.5–49.3)
HGB BLD-MCNC: 9.1 G/DL (ref 12–17)
IMM GRANULOCYTES # BLD AUTO: 0.01 THOUSAND/UL (ref 0–0.2)
IMM GRANULOCYTES NFR BLD AUTO: 0 % (ref 0–2)
LYMPHOCYTES # BLD AUTO: 2 THOUSANDS/ΜL (ref 0.6–4.47)
LYMPHOCYTES NFR BLD AUTO: 29 % (ref 14–44)
MCH RBC QN AUTO: 28.4 PG (ref 26.8–34.3)
MCHC RBC AUTO-ENTMCNC: 31.3 G/DL (ref 31.4–37.4)
MCV RBC AUTO: 91 FL (ref 82–98)
MONOCYTES # BLD AUTO: 0.64 THOUSAND/ΜL (ref 0.17–1.22)
MONOCYTES NFR BLD AUTO: 9 % (ref 4–12)
NEUTROPHILS # BLD AUTO: 3.89 THOUSANDS/ΜL (ref 1.85–7.62)
NEUTS SEG NFR BLD AUTO: 56 % (ref 43–75)
NRBC BLD AUTO-RTO: 0 /100 WBCS
PLATELET # BLD AUTO: 258 THOUSANDS/UL (ref 149–390)
PMV BLD AUTO: 8.8 FL (ref 8.9–12.7)
POTASSIUM SERPL-SCNC: 4.7 MMOL/L (ref 3.5–5.3)
PROT SERPL-MCNC: 6.6 G/DL (ref 6.4–8.2)
RBC # BLD AUTO: 3.2 MILLION/UL (ref 3.88–5.62)
SODIUM SERPL-SCNC: 138 MMOL/L (ref 136–145)
VANCOMYCIN TROUGH SERPL-MCNC: 18.6 UG/ML (ref 10–20)
WBC # BLD AUTO: 6.97 THOUSAND/UL (ref 4.31–10.16)

## 2018-09-11 PROCEDURE — 80053 COMPREHEN METABOLIC PANEL: CPT

## 2018-09-11 PROCEDURE — 36415 COLL VENOUS BLD VENIPUNCTURE: CPT

## 2018-09-11 PROCEDURE — 85025 COMPLETE CBC W/AUTO DIFF WBC: CPT

## 2018-09-11 PROCEDURE — 80202 ASSAY OF VANCOMYCIN: CPT

## 2018-09-14 ENCOUNTER — APPOINTMENT (OUTPATIENT)
Dept: LAB | Facility: HOSPITAL | Age: 48
End: 2018-09-14
Payer: COMMERCIAL

## 2018-09-14 ENCOUNTER — TRANSCRIBE ORDERS (OUTPATIENT)
Dept: ADMINISTRATIVE | Facility: HOSPITAL | Age: 48
End: 2018-09-14

## 2018-09-14 DIAGNOSIS — M86.10 ACUTE OSTEOMYELITIS (HCC): Primary | ICD-10-CM

## 2018-09-14 DIAGNOSIS — M86.10 ACUTE OSTEOMYELITIS (HCC): ICD-10-CM

## 2018-09-14 LAB
ALBUMIN SERPL BCP-MCNC: 2.9 G/DL (ref 3.5–5)
ALP SERPL-CCNC: 305 U/L (ref 46–116)
ALT SERPL W P-5'-P-CCNC: 36 U/L (ref 12–78)
ANION GAP SERPL CALCULATED.3IONS-SCNC: 15 MMOL/L (ref 4–13)
AST SERPL W P-5'-P-CCNC: 26 U/L (ref 5–45)
BASOPHILS # BLD AUTO: 0.03 THOUSANDS/ΜL (ref 0–0.1)
BASOPHILS NFR BLD AUTO: 0 % (ref 0–1)
BILIRUB SERPL-MCNC: 0.5 MG/DL (ref 0.2–1)
BUN SERPL-MCNC: 58 MG/DL (ref 5–25)
CALCIUM SERPL-MCNC: 7.4 MG/DL (ref 8.3–10.1)
CHLORIDE SERPL-SCNC: 109 MMOL/L (ref 100–108)
CO2 SERPL-SCNC: 17 MMOL/L (ref 21–32)
CREAT SERPL-MCNC: 3.41 MG/DL (ref 0.6–1.3)
EOSINOPHIL # BLD AUTO: 0.47 THOUSAND/ΜL (ref 0–0.61)
EOSINOPHIL NFR BLD AUTO: 6 % (ref 0–6)
ERYTHROCYTE [DISTWIDTH] IN BLOOD BY AUTOMATED COUNT: 15.1 % (ref 11.6–15.1)
GFR SERPL CREATININE-BSD FRML MDRD: 20 ML/MIN/1.73SQ M
GLUCOSE SERPL-MCNC: 125 MG/DL (ref 65–140)
HCT VFR BLD AUTO: 28.1 % (ref 36.5–49.3)
HGB BLD-MCNC: 9 G/DL (ref 12–17)
IMM GRANULOCYTES # BLD AUTO: 0.02 THOUSAND/UL (ref 0–0.2)
IMM GRANULOCYTES NFR BLD AUTO: 0 % (ref 0–2)
LYMPHOCYTES # BLD AUTO: 2.38 THOUSANDS/ΜL (ref 0.6–4.47)
LYMPHOCYTES NFR BLD AUTO: 29 % (ref 14–44)
MCH RBC QN AUTO: 29.1 PG (ref 26.8–34.3)
MCHC RBC AUTO-ENTMCNC: 32 G/DL (ref 31.4–37.4)
MCV RBC AUTO: 91 FL (ref 82–98)
MONOCYTES # BLD AUTO: 0.69 THOUSAND/ΜL (ref 0.17–1.22)
MONOCYTES NFR BLD AUTO: 9 % (ref 4–12)
NEUTROPHILS # BLD AUTO: 4.5 THOUSANDS/ΜL (ref 1.85–7.62)
NEUTS SEG NFR BLD AUTO: 56 % (ref 43–75)
NRBC BLD AUTO-RTO: 0 /100 WBCS
PLATELET # BLD AUTO: 264 THOUSANDS/UL (ref 149–390)
PMV BLD AUTO: 8.6 FL (ref 8.9–12.7)
POTASSIUM SERPL-SCNC: 4.9 MMOL/L (ref 3.5–5.3)
PROT SERPL-MCNC: 6.6 G/DL (ref 6.4–8.2)
RBC # BLD AUTO: 3.09 MILLION/UL (ref 3.88–5.62)
SODIUM SERPL-SCNC: 141 MMOL/L (ref 136–145)
VANCOMYCIN TROUGH SERPL-MCNC: 23.5 UG/ML (ref 10–20)
WBC # BLD AUTO: 8.09 THOUSAND/UL (ref 4.31–10.16)

## 2018-09-14 PROCEDURE — 85025 COMPLETE CBC W/AUTO DIFF WBC: CPT

## 2018-09-14 PROCEDURE — 80053 COMPREHEN METABOLIC PANEL: CPT

## 2018-09-14 PROCEDURE — 36415 COLL VENOUS BLD VENIPUNCTURE: CPT

## 2018-09-14 PROCEDURE — 80202 ASSAY OF VANCOMYCIN: CPT

## 2018-09-17 ENCOUNTER — APPOINTMENT (OUTPATIENT)
Dept: LAB | Facility: HOSPITAL | Age: 48
End: 2018-09-17
Payer: COMMERCIAL

## 2018-09-17 ENCOUNTER — TRANSCRIBE ORDERS (OUTPATIENT)
Dept: ADMINISTRATIVE | Facility: HOSPITAL | Age: 48
End: 2018-09-17

## 2018-09-17 DIAGNOSIS — M86.10 ACUTE OSTEOMYELITIS (HCC): Primary | ICD-10-CM

## 2018-09-17 DIAGNOSIS — M86.10 ACUTE OSTEOMYELITIS (HCC): ICD-10-CM

## 2018-09-17 LAB
ALBUMIN SERPL BCP-MCNC: 3 G/DL (ref 3.5–5)
ALP SERPL-CCNC: 303 U/L (ref 46–116)
ALT SERPL W P-5'-P-CCNC: 36 U/L (ref 12–78)
ANION GAP SERPL CALCULATED.3IONS-SCNC: 13 MMOL/L (ref 4–13)
AST SERPL W P-5'-P-CCNC: 21 U/L (ref 5–45)
BASOPHILS # BLD AUTO: 0.03 THOUSANDS/ΜL (ref 0–0.1)
BASOPHILS NFR BLD AUTO: 0 % (ref 0–1)
BILIRUB SERPL-MCNC: 0.5 MG/DL (ref 0.2–1)
BUN SERPL-MCNC: 52 MG/DL (ref 5–25)
CALCIUM SERPL-MCNC: 8 MG/DL (ref 8.3–10.1)
CHLORIDE SERPL-SCNC: 106 MMOL/L (ref 100–108)
CO2 SERPL-SCNC: 18 MMOL/L (ref 21–32)
CREAT SERPL-MCNC: 3.18 MG/DL (ref 0.6–1.3)
EOSINOPHIL # BLD AUTO: 0.51 THOUSAND/ΜL (ref 0–0.61)
EOSINOPHIL NFR BLD AUTO: 7 % (ref 0–6)
ERYTHROCYTE [DISTWIDTH] IN BLOOD BY AUTOMATED COUNT: 15.2 % (ref 11.6–15.1)
GFR SERPL CREATININE-BSD FRML MDRD: 22 ML/MIN/1.73SQ M
GLUCOSE SERPL-MCNC: 118 MG/DL (ref 65–140)
HCT VFR BLD AUTO: 28.9 % (ref 36.5–49.3)
HGB BLD-MCNC: 9.2 G/DL (ref 12–17)
IMM GRANULOCYTES # BLD AUTO: 0.02 THOUSAND/UL (ref 0–0.2)
IMM GRANULOCYTES NFR BLD AUTO: 0 % (ref 0–2)
LYMPHOCYTES # BLD AUTO: 2.16 THOUSANDS/ΜL (ref 0.6–4.47)
LYMPHOCYTES NFR BLD AUTO: 28 % (ref 14–44)
MCH RBC QN AUTO: 29.1 PG (ref 26.8–34.3)
MCHC RBC AUTO-ENTMCNC: 31.8 G/DL (ref 31.4–37.4)
MCV RBC AUTO: 92 FL (ref 82–98)
MONOCYTES # BLD AUTO: 0.57 THOUSAND/ΜL (ref 0.17–1.22)
MONOCYTES NFR BLD AUTO: 7 % (ref 4–12)
NEUTROPHILS # BLD AUTO: 4.49 THOUSANDS/ΜL (ref 1.85–7.62)
NEUTS SEG NFR BLD AUTO: 58 % (ref 43–75)
NRBC BLD AUTO-RTO: 0 /100 WBCS
PLATELET # BLD AUTO: 272 THOUSANDS/UL (ref 149–390)
PMV BLD AUTO: 8.7 FL (ref 8.9–12.7)
POTASSIUM SERPL-SCNC: 4.9 MMOL/L (ref 3.5–5.3)
PROT SERPL-MCNC: 6.7 G/DL (ref 6.4–8.2)
RBC # BLD AUTO: 3.16 MILLION/UL (ref 3.88–5.62)
SODIUM SERPL-SCNC: 137 MMOL/L (ref 136–145)
VANCOMYCIN TROUGH SERPL-MCNC: 12.7 UG/ML (ref 10–20)
WBC # BLD AUTO: 7.78 THOUSAND/UL (ref 4.31–10.16)

## 2018-09-17 PROCEDURE — 80053 COMPREHEN METABOLIC PANEL: CPT

## 2018-09-17 PROCEDURE — 80202 ASSAY OF VANCOMYCIN: CPT

## 2018-09-17 PROCEDURE — 36415 COLL VENOUS BLD VENIPUNCTURE: CPT

## 2018-09-17 PROCEDURE — 85025 COMPLETE CBC W/AUTO DIFF WBC: CPT

## 2018-09-24 ENCOUNTER — TRANSCRIBE ORDERS (OUTPATIENT)
Dept: ADMINISTRATIVE | Facility: HOSPITAL | Age: 48
End: 2018-09-24

## 2018-09-24 ENCOUNTER — APPOINTMENT (OUTPATIENT)
Dept: LAB | Facility: HOSPITAL | Age: 48
End: 2018-09-24
Payer: COMMERCIAL

## 2018-09-24 DIAGNOSIS — M86.10 OSTEOMYELITIS, ACUTE (HCC): Primary | ICD-10-CM

## 2018-09-24 DIAGNOSIS — M86.10 OSTEOMYELITIS, ACUTE (HCC): ICD-10-CM

## 2018-09-24 LAB
ALBUMIN SERPL BCP-MCNC: 2.9 G/DL (ref 3.5–5)
ALP SERPL-CCNC: 319 U/L (ref 46–116)
ALT SERPL W P-5'-P-CCNC: 35 U/L (ref 12–78)
ANION GAP SERPL CALCULATED.3IONS-SCNC: 13 MMOL/L (ref 4–13)
AST SERPL W P-5'-P-CCNC: 23 U/L (ref 5–45)
BASOPHILS # BLD AUTO: 0.03 THOUSANDS/ΜL (ref 0–0.1)
BASOPHILS NFR BLD AUTO: 0 % (ref 0–1)
BILIRUB SERPL-MCNC: 0.4 MG/DL (ref 0.2–1)
BUN SERPL-MCNC: 59 MG/DL (ref 5–25)
CALCIUM SERPL-MCNC: 7.4 MG/DL (ref 8.3–10.1)
CHLORIDE SERPL-SCNC: 105 MMOL/L (ref 100–108)
CO2 SERPL-SCNC: 17 MMOL/L (ref 21–32)
CREAT SERPL-MCNC: 3.12 MG/DL (ref 0.6–1.3)
EOSINOPHIL # BLD AUTO: 0.15 THOUSAND/ΜL (ref 0–0.61)
EOSINOPHIL NFR BLD AUTO: 2 % (ref 0–6)
ERYTHROCYTE [DISTWIDTH] IN BLOOD BY AUTOMATED COUNT: 15 % (ref 11.6–15.1)
EST. AVERAGE GLUCOSE BLD GHB EST-MCNC: 154 MG/DL
GFR SERPL CREATININE-BSD FRML MDRD: 22 ML/MIN/1.73SQ M
GLUCOSE SERPL-MCNC: 245 MG/DL (ref 65–140)
HBA1C MFR BLD: 7 % (ref 4.2–6.3)
HCT VFR BLD AUTO: 28.4 % (ref 36.5–49.3)
HGB BLD-MCNC: 9.1 G/DL (ref 12–17)
IMM GRANULOCYTES # BLD AUTO: 0.03 THOUSAND/UL (ref 0–0.2)
IMM GRANULOCYTES NFR BLD AUTO: 0 % (ref 0–2)
LYMPHOCYTES # BLD AUTO: 1.96 THOUSANDS/ΜL (ref 0.6–4.47)
LYMPHOCYTES NFR BLD AUTO: 26 % (ref 14–44)
MCH RBC QN AUTO: 29.2 PG (ref 26.8–34.3)
MCHC RBC AUTO-ENTMCNC: 32 G/DL (ref 31.4–37.4)
MCV RBC AUTO: 91 FL (ref 82–98)
MONOCYTES # BLD AUTO: 0.53 THOUSAND/ΜL (ref 0.17–1.22)
MONOCYTES NFR BLD AUTO: 7 % (ref 4–12)
NEUTROPHILS # BLD AUTO: 4.95 THOUSANDS/ΜL (ref 1.85–7.62)
NEUTS SEG NFR BLD AUTO: 65 % (ref 43–75)
NRBC BLD AUTO-RTO: 0 /100 WBCS
PLATELET # BLD AUTO: 258 THOUSANDS/UL (ref 149–390)
PMV BLD AUTO: 8.8 FL (ref 8.9–12.7)
POTASSIUM SERPL-SCNC: 5.1 MMOL/L (ref 3.5–5.3)
PROT SERPL-MCNC: 6.4 G/DL (ref 6.4–8.2)
RBC # BLD AUTO: 3.12 MILLION/UL (ref 3.88–5.62)
SODIUM SERPL-SCNC: 135 MMOL/L (ref 136–145)
VANCOMYCIN TROUGH SERPL-MCNC: 13.5 UG/ML (ref 10–20)
WBC # BLD AUTO: 7.65 THOUSAND/UL (ref 4.31–10.16)

## 2018-09-24 PROCEDURE — 36415 COLL VENOUS BLD VENIPUNCTURE: CPT

## 2018-09-24 PROCEDURE — 80202 ASSAY OF VANCOMYCIN: CPT

## 2018-09-24 PROCEDURE — 83036 HEMOGLOBIN GLYCOSYLATED A1C: CPT

## 2018-09-24 PROCEDURE — 85025 COMPLETE CBC W/AUTO DIFF WBC: CPT

## 2018-09-24 PROCEDURE — 80053 COMPREHEN METABOLIC PANEL: CPT

## 2018-10-05 ENCOUNTER — HOSPITAL ENCOUNTER (OUTPATIENT)
Dept: RADIOLOGY | Facility: HOSPITAL | Age: 48
Discharge: HOME | End: 2018-10-05
Attending: FAMILY MEDICINE
Payer: COMMERCIAL

## 2018-10-05 DIAGNOSIS — Z45.2 PICC (PERIPHERALLY INSERTED CENTRAL CATHETER) REMOVAL: ICD-10-CM

## 2018-10-05 PROCEDURE — 36589 REMOVAL TUNNELED CV CATH: CPT | Performed by: RADIOLOGY

## 2018-10-05 PROCEDURE — 36589 REMOVAL TUNNELED CV CATH: CPT

## 2018-10-30 ENCOUNTER — TRANSCRIBE ORDERS (OUTPATIENT)
Dept: ADMINISTRATIVE | Facility: HOSPITAL | Age: 48
End: 2018-10-30

## 2018-10-30 ENCOUNTER — APPOINTMENT (OUTPATIENT)
Dept: LAB | Facility: HOSPITAL | Age: 48
End: 2018-10-30
Payer: COMMERCIAL

## 2018-10-30 DIAGNOSIS — I10 ESSENTIAL HYPERTENSION: ICD-10-CM

## 2018-10-30 DIAGNOSIS — I42.9 CARDIOMYOPATHY, UNSPECIFIED TYPE (HCC): ICD-10-CM

## 2018-10-30 DIAGNOSIS — I25.110 CORONARY ARTERY DISEASE WITH UNSTABLE ANGINA PECTORIS, UNSPECIFIED VESSEL OR LESION TYPE, UNSPECIFIED WHETHER NATIVE OR TRANSPLANTED HEART (HCC): Primary | ICD-10-CM

## 2018-10-30 DIAGNOSIS — I25.110 CORONARY ARTERY DISEASE WITH UNSTABLE ANGINA PECTORIS, UNSPECIFIED VESSEL OR LESION TYPE, UNSPECIFIED WHETHER NATIVE OR TRANSPLANTED HEART (HCC): ICD-10-CM

## 2018-10-30 LAB
ALBUMIN SERPL BCP-MCNC: 2.8 G/DL (ref 3.5–5)
ALP SERPL-CCNC: 215 U/L (ref 46–116)
ALT SERPL W P-5'-P-CCNC: 28 U/L (ref 12–78)
ANION GAP SERPL CALCULATED.3IONS-SCNC: 16 MMOL/L (ref 4–13)
AST SERPL W P-5'-P-CCNC: 18 U/L (ref 5–45)
BILIRUB SERPL-MCNC: 0.4 MG/DL (ref 0.2–1)
BUN SERPL-MCNC: 73 MG/DL (ref 5–25)
CALCIUM SERPL-MCNC: 8.1 MG/DL (ref 8.3–10.1)
CHLORIDE SERPL-SCNC: 107 MMOL/L (ref 100–108)
CO2 SERPL-SCNC: 19 MMOL/L (ref 21–32)
CREAT SERPL-MCNC: 3.8 MG/DL (ref 0.6–1.3)
GFR SERPL CREATININE-BSD FRML MDRD: 18 ML/MIN/1.73SQ M
GLUCOSE SERPL-MCNC: 147 MG/DL (ref 65–140)
POTASSIUM SERPL-SCNC: 4.5 MMOL/L (ref 3.5–5.3)
PROT SERPL-MCNC: 6.3 G/DL (ref 6.4–8.2)
SODIUM SERPL-SCNC: 142 MMOL/L (ref 136–145)

## 2018-10-30 PROCEDURE — 80053 COMPREHEN METABOLIC PANEL: CPT

## 2018-10-30 PROCEDURE — 36415 COLL VENOUS BLD VENIPUNCTURE: CPT

## 2018-12-10 ENCOUNTER — TRANSCRIBE ORDERS (OUTPATIENT)
Dept: ADMINISTRATIVE | Facility: HOSPITAL | Age: 48
End: 2018-12-10

## 2018-12-10 ENCOUNTER — APPOINTMENT (OUTPATIENT)
Dept: LAB | Facility: HOSPITAL | Age: 48
End: 2018-12-10
Payer: COMMERCIAL

## 2018-12-10 DIAGNOSIS — M86.10 ACUTE BONE INFECTION (HCC): Primary | ICD-10-CM

## 2018-12-10 DIAGNOSIS — M86.10 ACUTE BONE INFECTION (HCC): ICD-10-CM

## 2018-12-10 LAB
ALBUMIN SERPL BCP-MCNC: 2.9 G/DL (ref 3.5–5)
ALP SERPL-CCNC: 178 U/L (ref 46–116)
ALT SERPL W P-5'-P-CCNC: 31 U/L (ref 12–78)
ANION GAP SERPL CALCULATED.3IONS-SCNC: 13 MMOL/L (ref 4–13)
AST SERPL W P-5'-P-CCNC: 26 U/L (ref 5–45)
BILIRUB SERPL-MCNC: 0.4 MG/DL (ref 0.2–1)
BUN SERPL-MCNC: 73 MG/DL (ref 5–25)
CALCIUM SERPL-MCNC: 7.9 MG/DL (ref 8.3–10.1)
CHLORIDE SERPL-SCNC: 108 MMOL/L (ref 100–108)
CO2 SERPL-SCNC: 21 MMOL/L (ref 21–32)
CREAT SERPL-MCNC: 3.57 MG/DL (ref 0.6–1.3)
GFR SERPL CREATININE-BSD FRML MDRD: 19 ML/MIN/1.73SQ M
GLUCOSE P FAST SERPL-MCNC: 103 MG/DL (ref 65–99)
POTASSIUM SERPL-SCNC: 4.5 MMOL/L (ref 3.5–5.3)
PROT SERPL-MCNC: 6.5 G/DL (ref 6.4–8.2)
SODIUM SERPL-SCNC: 142 MMOL/L (ref 136–145)

## 2018-12-10 PROCEDURE — 36415 COLL VENOUS BLD VENIPUNCTURE: CPT

## 2018-12-10 PROCEDURE — 80053 COMPREHEN METABOLIC PANEL: CPT

## 2018-12-18 ENCOUNTER — APPOINTMENT (EMERGENCY)
Dept: RADIOLOGY | Facility: HOSPITAL | Age: 48
End: 2018-12-18
Payer: COMMERCIAL

## 2018-12-18 ENCOUNTER — HOSPITAL ENCOUNTER (EMERGENCY)
Facility: HOSPITAL | Age: 48
Discharge: HOME | End: 2018-12-18
Attending: EMERGENCY MEDICINE
Payer: COMMERCIAL

## 2018-12-18 VITALS
DIASTOLIC BLOOD PRESSURE: 59 MMHG | SYSTOLIC BLOOD PRESSURE: 131 MMHG | OXYGEN SATURATION: 94 % | HEIGHT: 64 IN | HEART RATE: 102 BPM | BODY MASS INDEX: 20.25 KG/M2 | WEIGHT: 118.61 LBS | RESPIRATION RATE: 18 BRPM

## 2018-12-18 DIAGNOSIS — Z96.7 FIXATION HARDWARE IN FOOT: ICD-10-CM

## 2018-12-18 DIAGNOSIS — M79.671 FOOT PAIN, RIGHT: Primary | ICD-10-CM

## 2018-12-18 PROCEDURE — 99283 EMERGENCY DEPT VISIT LOW MDM: CPT

## 2018-12-18 PROCEDURE — 73630 X-RAY EXAM OF FOOT: CPT

## 2018-12-18 PROCEDURE — 73590 X-RAY EXAM OF LOWER LEG: CPT

## 2018-12-18 NOTE — ED PROVIDER NOTES
History  Chief Complaint   Patient presents with    Pain     twisted  r ankle and now has pain on R ankle     23-year-old male presents complaining of right foot and right ankle pain after twisting it wall walking in his walking boot on Friday  Patient has had intermittent pain since that time  Patient denies chest pain or shortness of breath  Patient refused other labs or further evaluation today  Patient has intact dorsalis pedis and posterior tibialis pulses  History provided by:  Patient and spouse  Ankle Injury   Severity:  Mild  Onset quality:  Sudden  Duration:  5 days  Timing:  Intermittent  Progression:  Waxing and waning  Associated symptoms: no abdominal pain, no chest pain, no congestion, no cough, no diarrhea, no ear pain and no headaches        Prior to Admission Medications   Prescriptions Last Dose Informant Patient Reported? Taking?    Lactobacillus (ACIDOPHILUS PO)   Yes No   Sig: Take 300 mg by mouth   aspirin (ECOTRIN LOW STRENGTH) 81 mg EC tablet   Yes Yes   Sig: Take 81 mg by mouth daily   atorvastatin (LIPITOR) 40 mg tablet   Yes Yes   Sig: Take 40 mg by mouth daily   azaTHIOprine (IMURAN) 50 mg tablet   Yes Yes   Sig: Take 50 mg by mouth daily   calcitriol (ROCALTROL) 0 5 MCG capsule   Yes Yes   Sig: Take 0 5 mcg by mouth daily   insulin detemir (LEVEMIR) 100 units/mL subcutaneous injection   Yes Yes   Sig: Inject 10 Units under the skin 2 (two) times a day   insulin lispro (HUMALOG) 100 units/mL injection   No Yes   Sig: Inject 30 Units under the skin daily   metoprolol succinate (TOPROL-XL) 25 mg 24 hr tablet   Yes Yes   Sig: Take 25 mg by mouth daily   multivitamin (THERAGRAN) TABS   Yes Yes   Sig: Take 1 tablet by mouth daily   predniSONE 5 mg tablet   Yes Yes   Sig: Take 5 mg by mouth daily   sodium bicarbonate 650 mg tablet   No Yes   Sig: Take 1 tablet by mouth 2 (two) times a day   tacrolimus (PROGRAF) 0 5 mg capsule  Self Yes Yes   Sig: Take 0 5 mg by mouth daily In the AM and 1mg hs       Facility-Administered Medications: None       Past Medical History:   Diagnosis Date    [facility] )     Diabetes mellitus ([facility] )     Diabetes mellitus type 1 ([facility] )     Hypertension     Infection at site of external fixator pin ([facility] )     [facility]     Pneumonia     Last Assessed [id number]    Renal failure     Renal transplant, status post 07/21/2007       Past Surgical History:   Procedure Laterality Date    ANKLE FRACTURE SURGERY      ANKLE HARDWARE REMOVAL Right 7/31/2017    Procedure: REMOVAL HARDWARE ANKLE;  Surgeon: Amadeo Torres MD;  [facility];  Service: Orthopedics    ANKLE HARDWARE REMOVAL Right 8/17/2017    Procedure: TIBIA FAILED HARDWARE REMOVAL;  Surgeon: Amadeo Torres MD;  [facility];  Service: Orthopedics    CARDIAC SURGERY      2 stents    CLOSED REDUCTION ANKLE Right 7/3/2017    Procedure: CLOSED REDUCTION DISTAL TIB-FIB AND CASTING VS;  Surgeon: Amadeo Torres MD;  [facility]: [facility];  Service: Orthopedics    EYE SURGERY      FRACTURE SURGERY      ORIF Rt Ankle    GLUTAMIC ACID DECARBOXYLASE (HISTORICAL)      IR PICC LINE  8/20/2018    IR VENOUS LINE REMOVAL  10/5/2018    NEPHRECTOMY TRANSPLANTED ORGAN      MI OPEN TREATMENT FRACTURE DISTAL TIBIA FIBULA Right 7/3/2017    Procedure: OPEN REDUCTION W/ INTERNAL FIXATION (ORIF); Surgeon: Amadeo Torres MD;  [facility]: [facility];  Service: Orthopedics    TOE AMPUTATION Right 10/27/2016    Procedure: AMPUTATION TOE;  Surgeon: Pauline Olivier DPM;  Location: [facility];  Service:        Family History   Problem Relation Age of Onset    Diabetes Brother     Coronary artery disease Mother      I have reviewed and agree with the history as documented      Social History   Substance Use Topics    Smoking status: Never Smoker    Smokeless tobacco: Never Used    Alcohol use No        Review of Systems   Constitutional: Negative for activity change, appetite change and chills  HENT: Negative for congestion and ear pain  Eyes: Negative for pain, discharge and itching  Respiratory: Negative for cough  Cardiovascular: Negative for chest pain  Gastrointestinal: Negative for abdominal pain and diarrhea  Endocrine: Negative for cold intolerance and heat intolerance  Genitourinary: Negative for difficulty urinating, dysuria and enuresis  Musculoskeletal: Positive for gait problem  Skin: Negative for color change and pallor  Allergic/Immunologic: Negative for environmental allergies and food allergies  Neurological: Negative for dizziness, facial asymmetry and headaches  Hematological: Negative for adenopathy  Psychiatric/Behavioral: Negative for agitation, behavioral problems and confusion  All other systems reviewed and are negative  Physical Exam  Physical Exam   Constitutional: He appears well-developed  No distress  HENT:   Head: Normocephalic  Right Ear: External ear normal    Left Ear: External ear normal    Eyes: Pupils are equal, round, and reactive to light  Right eye exhibits no discharge  Left eye exhibits no discharge  Neck: Normal range of motion  No tracheal deviation present  No thyromegaly present  Cardiovascular: Normal rate, regular rhythm and normal heart sounds  Pulmonary/Chest: Effort normal  No respiratory distress  He has no wheezes  He has no rales  Abdominal: Soft  He exhibits no distension  There is no tenderness  There is no guarding  Musculoskeletal: He exhibits tenderness  He exhibits no edema  Minimal tenderness to the midfoot and medial and lateral malleolar region  There is no redness swelling or signs of infection   Neurological: He is alert  He displays normal reflexes  No cranial nerve deficit  Coordination normal    Skin: Skin is warm  Capillary refill takes less than 2 seconds  No rash noted  He is not diaphoretic  No erythema  No pallor     Patient with intact dorsalis pedis and posterior tibialis to the right ankle  Patient with prior amputation of the right great toe  Capillary refill to the remaining digits less than 2 seconds   Psychiatric: He has a normal mood and affect  His behavior is normal        Vital Signs  ED Triage Vitals [12/18/18 0741]   Temp Pulse Respirations Blood Pressure SpO2   -- 102 18 131/59 94 %      Temp src Heart Rate Source Patient Position - Orthostatic VS BP Location FiO2 (%)   -- Monitor Sitting Left arm --      Pain Score       5           Vitals:    12/18/18 0741   BP: 131/59   Pulse: 102   Patient Position - Orthostatic VS: Sitting       Visual Acuity      ED Medications  Medications - No data to display    Diagnostic Studies  Results Reviewed     None                 XR tibia fibula 2 views RIGHT   ED Interpretation by Steffen Winston DO (12/18 7183)      There is no acute fracture or dislocation  Patient is status post tibiotalar and subtalar joint fusion with intramedullary nail  One of the distal interlocking screws in the calcaneus is backed out, protruding by about 1 cm  The most proximal interlocking screw in the proximal tibia is also backed out by about 0 7 cm  Workstation performed: [id number]         Final Result by Karmen Francisco MD (12/18 7122)      There is no acute fracture or dislocation  Patient is status post tibiotalar and subtalar joint fusion with intramedullary nail  One of the distal interlocking screws in the calcaneus is backed out, protruding by about 1 cm  The most proximal interlocking screw in the proximal tibia is also backed out by about 0 7 cm  Workstation performed: [id number]         XR foot 3+ views RIGHT   Final Result by Karmen Francisco MD (81/03 8225)      Patient is status post tibiotalar and subtalar joint fusion with intramedullary nail  One of the distal interlocking screws in the calcaneus is backed out, protruding by about 1 cm              Workstation performed: [id number] Procedures  Procedures       Phone Contacts  ED Phone Contact    ED Course  ED Course as of Dec 18 1612   Tue Dec 18, 2018   0829 No fx     0844 XR tibia fibula 2 views RIGHT                               [facility]  Number of Diagnoses or Management Options  Diagnosis management comments: Differential diagnosis 1  Sprain 2  Strain 3  Fracture    Patient already has 2 different walking boots  Patient has a walker at home  Patient sees orthopedic surgeon in [place]  Patient and wife refused blood work today  CritCare Time    Disposition  Final diagnoses: Foot pain, right   Fixation hardware in foot     Time reflects when diagnosis was documented in both MDM as applicable and the Disposition within this note     Time User Action Codes Description Comment    12/18/2018  8:42 AM Orville Lesches Add [Z94 583] Foot pain, right     12/18/2018  8:43 AM Orville Lesches Add [Z96 7] Fixation hardware in foot       ED Disposition     ED Disposition Condition Comment    Discharge  [address] [facility] [address] discharge to home/self care      Condition at discharge: Good        Follow-up Information    None         Discharge Medication List as of 12/18/2018  8:43 AM      CONTINUE these medications which have NOT CHANGED    Details   aspirin (ECOTRIN LOW STRENGTH) 81 mg EC tablet Take 81 mg by mouth daily, Historical Med      atorvastatin (LIPITOR) 40 mg tablet Take 40 mg by mouth daily, Historical Med      azaTHIOprine (IMURAN) 50 mg tablet Take 50 mg by mouth daily, Historical Med      calcitriol (ROCALTROL) 0 5 MCG capsule Take 0 5 mcg by mouth daily, Historical Med      insulin detemir (LEVEMIR) 100 units/mL subcutaneous injection Inject 10 Units under the skin 2 (two) times a day, Historical Med      insulin lispro (HUMALOG) 100 units/mL injection Inject 30 Units under the skin daily, Starting Wed 5/30/2018, Normal      metoprolol succinate (TOPROL-XL) 25 mg 24 hr tablet Take 25 mg by mouth daily, Historical Med      multivitamin (THERAGRAN) TABS Take 1 tablet by mouth daily, Historical Med      predniSONE 5 mg tablet Take 5 mg by mouth daily, Historical Med      sodium bicarbonate 650 mg tablet Take 1 tablet by mouth 2 (two) times a day, Starting Thu 10/26/2017, Print      tacrolimus (PROGRAF) 0 5 mg capsule Take 0 5 mg by mouth daily In the AM and 1mg hs , Historical Med      Lactobacillus (ACIDOPHILUS PO) Take 300 mg by mouth, Historical Med           No discharge procedures on file      ED Provider  Electronically Signed by           Nickie Carrera,   12/18/18 [address] [place] [address],   12/18/18 6384

## 2018-12-18 NOTE — DISCHARGE INSTRUCTIONS
Arthralgia   WHAT YOU NEED TO KNOW:   Arthralgia is pain in one or more joints, with no inflammation  It may be short-term and get better within 6 to 8 weeks  Arthralgia can be an early sign of arthritis  Arthralgia may be caused by a medical condition, such as a hormone disorder or a tumor  It may also be caused by an infection or injury  DISCHARGE INSTRUCTIONS:   Medicines: The following medicines may  be ordered for you:  · Acetaminophen  decreases pain  Ask how much to take and how often to take it  Follow directions  Acetaminophen can cause liver damage if not taken correctly  · NSAIDs  decrease pain and prevent swelling  Ask your healthcare provider which medicine is right for you  Ask how much to take and when to take it  Take as directed  NSAIDs can cause stomach bleeding and kidney problems if not taken correctly  · Pain relief cream  decreases pain  Use this cream as directed  · Take your medicine as directed  Contact your healthcare provider if you think your medicine is not helping or if you have side effects  Tell him of her if you are allergic to any medicine  Keep a list of the medicines, vitamins, and herbs you take  Include the amounts, and when and why you take them  Bring the list or the pill bottles to follow-up visits  Carry your medicine list with you in case of an emergency  Follow up with your healthcare provider or specialist as directed:  Write down your questions so you remember to ask them during your visits  Self-care:   · Apply heat  to help decrease pain  Use a heating pad or heat wrap  Apply heat for 20 to 30 minutes every 2 hours for as many days as directed  · Rest  as much as possible  Avoid activities that cause joint pain  · Apply ice  to help decrease swelling and pain  Ice may also help prevent tissue damage  Use an ice pack, or put crushed ice in a plastic bag   Cover it with a towel and place it on your painful joint for 15 to 20 minutes every hour or as directed  · Support  the joint with a brace or elastic wrap as directed  · Elevate  your joint above the level of your heart as often as you can to help decrease swelling and pain  Prop your painful joint on pillows or blankets to keep it elevated comfortably  · Lose weight  if you are overweight  Extra weight can put pressure on your joints and cause more pain  Ask your healthcare provider how much you should weigh  Ask him to help you create a weight loss plan  · Exercise  regularly to help improve joint movement and to decrease pain  Ask about the best exercise plan for you  Low-impact exercises can help take the pressure off your joints  Examples are walking, swimming, and water aerobics  Physical therapy:  A physical therapist teaches you exercises to help improve movement and strength, and to decrease pain  Ask your healthcare provider if physical therapy is right for you  Contact your healthcare provider or specialist if:   · You have a fever  · You continue to have joint pain that cannot be relieved with heat, ice, or medicine  · You have pain and inflammation around your joint  · You have questions or concerns about your condition or care  Return to the emergency department if:   · You have sudden, severe pain when you move your joint  · You have a fever and shaking chills  · You cannot move your joint  · You lose feeling on the side of your body where you have the painful joint  © 2017 [employer]  Information is for End User's use only and may not be sold, redistributed or otherwise used for commercial purposes  All illustrations and images included in CareNotes® are the copyrighted property of [employer]  or [employer]  The above information is an  only  It is not intended as medical advice for individual conditions or treatments   Talk to your doctor, nurse or pharmacist before following any medical regimen to see if it is safe and effective for you  Foot Fracture in Adults   WHAT YOU NEED TO KNOW:   What is a foot fracture? A foot fracture is a break in one or more of the bones in your foot  Foot fractures are commonly caused by trauma, falls, or repeated stress injuries  What are the different types of foot fractures? · Nondisplaced: The bone cracks or breaks but stays in place  · Displaced: The bone breaks into 2 pieces  · Comminuted: The bone is broken in many different places  · Open fracture: The broken bone breaks through your skin  What are the signs and symptoms of a foot fracture? · Tenderness over the injured area    · Foot pain that increases when you try to stand or walk    · Numbness in your foot or toes    · Cracking sounds when you move your foot    · Swelling, bruising, blistering, or open breaks in the skin of your injured foot    · Decreased ability to move your foot or walk    · A different shape to your foot  How is a foot fracture diagnosed? Your healthcare provider will examine your foot  He may touch your foot to see if you have decreased feeling  He will check for any open breaks in the skin  He may check your foot movement  You may need any of the following tests:  · X-ray: This is a picture taken to check your foot for broken bones  You may need to put weight on your injured foot to take the x-ray  · CT scan: This test is also called a CAT scan  An x-ray machine uses a computer to take pictures of your foot  The pictures may show broken bones or other foot injuries  You may be given dye before the pictures are taken to help healthcare providers see the pictures better  Tell the healthcare provider if you have ever had an allergic reaction to contrast dye  · MRI:  This scan uses powerful magnets and a computer to take pictures of your foot  An MRI may show a fracture or other foot injuries  You may be given dye to help the pictures show up better   Tell the healthcare provider if you have ever had an allergic reaction to contrast dye  Do not enter the MRI room with anything metal  Metal can cause serious injury  Tell the healthcare provider if you have any metal in or on your body  · Bone scan: You are given a small, safe amount of radioactive dye in an IV  Pictures are then taken of your foot bones to look for fractures  How is a foot fracture treated? Treatment depends on what kind of fracture you have and how bad it is  You may need any of the following:  · Boot, cast, or splint:  A boot, cast, or splint may be put on your foot and lower leg to decrease your foot movement  These work to hold the broken bones in place, decrease pain, and prevent further damage to your foot  · Medicine:      ¨ Pain medicine: You may be given a prescription medicine to decrease pain  Do not wait until the pain is severe before you take this medicine  ¨ Antibiotics: This medicine is given to help treat or prevent an infection caused by bacteria  ¨ Tetanus shot: This is a shot of medicine to prevent you from getting tetanus  You may need this if you have breaks in your skin from your injury  You should have a tetanus shot if you have not had one in the past 5 to 10 years  · Surgery: You may need surgery if you have a severe foot fracture or if your fracture does not heal with other treatments  If you have an open fracture, you may need debridement before your surgery  This is when your healthcare provider removes damaged and infected tissue and cleans your wound  Debridement is done to help prevent infection and improve healing  You may need any of the following:    ¨ External fixation:  Your healthcare provider will put screws through your skin and into your broken bones  The screws will be secured to a device outside of your foot   External fixation holds your bones together so they can heal     ¨ Open reduction and internal fixation:  During this surgery, your healthcare provider will make an incision in your foot to straighten your broken bones  He may use wires, screws and metal plates, or nails to hold your broken bones together  ¨ Pin fixation:  Your healthcare provider may need to use metal wire pins to straighten the broken bones in your foot  The pins will hold the broken pieces of bone together  Pins will be placed through your skin and into your bone using a small drill  · Traction:  Traction pulls on the bones to put them back into place  A pin may be put in your bone or cast and hooked to a traction device  Weights are hung from the traction device to help pull the bones into the right position  What are the risks of a foot fracture? · During surgery, the nerves, tissues, and blood vessels in your foot may be damaged  You may have numbness or weakness in your foot and toes  Your foot may not heal properly or work as well as it did before your injury  Screws, nails, or pins used during your surgery may come loose, and you may need another surgery  You may get an infection  You may get a blood clot in your leg  The clot may travel to your heart or brain and cause life-threatening problems, such as a heart attack or stroke  · Without treatment, your broken foot may not heal  If your fracture heals on its own, your foot may be deformed  You may not be able to move your foot as well as you did before your injury  You may have pain, weakness, or loss of feeling in your foot  You may be at risk for blood clots  You may have tissue damage, and you may get an infection  Severe infections may lead to a bone infection, and you may need your foot amputated  What can I do to help my foot heal?   · Rest:  You may need to rest your foot and avoid activities that cause pain  For stress fractures, you will need to avoid the activity that caused the fracture until it heals  · Ice:  Ice helps decrease swelling and pain  Ice may also help prevent tissue damage   Use an ice pack or put crushed ice in a plastic bag  Cover it with a towel, and place it on your foot for 15 to 20 minutes every hour as directed  · Elevate your foot:  Raise your foot at or above the level of your heart as often as you can  This will help decrease swelling and pain  Prop your foot on pillows or blankets to keep it elevated comfortably  · Physical therapy: Once your foot has healed, a physical therapist can teach you exercises to help improve movement and strength, and to decrease pain  When should I contact my healthcare provider? · You have a fever  · You have new sores around your boot, cast, or splint  · You have new or worsening trouble moving your foot  · You notice a foul smell coming from under your cast     · Your boot, cast, or splint gets damaged  · You have questions about your condition or care  When should I seek immediate care or call 911? · The pain in your injured foot gets worse even after you rest and take pain medicine  · The skin or toes of your foot become numb, swollen, cold, white, or blue  · You have more pain or swelling than you did before the cast was put on  · Your wound is draining fluid or pus  · Blood soaks through your bandage  · Your leg feels warm, tender, and painful  It may look swollen and red  · You suddenly feel lightheaded and short of breath  · You have chest pain when you take a deep breath or cough  You may cough up blood  CARE AGREEMENT:   You have the right to help plan your care  Learn about your health condition and how it may be treated  Discuss treatment options with your caregivers to decide what care you want to receive  You always have the right to refuse treatment  The above information is an  only  It is not intended as medical advice for individual conditions or treatments   Talk to your doctor, nurse or pharmacist before following any medical regimen to see if it is safe and effective for you  © 2017 [facility] Information is for End User's use only and may not be sold, redistributed or otherwise used for commercial purposes  All illustrations and images included in CareNotes® are the copyrighted property of [employer]  or [employer]

## 2018-12-19 ENCOUNTER — HOSPITAL ENCOUNTER (EMERGENCY)
Facility: HOSPITAL | Age: 48
Discharge: ANOTHER HOSPITAL | End: 2018-12-19
Attending: EMERGENCY MEDICINE
Payer: COMMERCIAL

## 2018-12-19 ENCOUNTER — HOSPITAL ENCOUNTER (INPATIENT)
Facility: HOSPITAL | Age: 48
LOS: 1 days | DRG: 871 | End: 2018-12-20
Attending: HOSPITALIST | Admitting: HOSPITALIST
Payer: COMMERCIAL

## 2018-12-19 ENCOUNTER — APPOINTMENT (EMERGENCY)
Dept: ULTRASOUND IMAGING | Facility: HOSPITAL | Age: 48
End: 2018-12-19
Payer: COMMERCIAL

## 2018-12-19 ENCOUNTER — APPOINTMENT (EMERGENCY)
Dept: RADIOLOGY | Facility: HOSPITAL | Age: 48
End: 2018-12-19
Payer: COMMERCIAL

## 2018-12-19 VITALS
TEMPERATURE: 98.5 F | RESPIRATION RATE: 20 BRPM | HEART RATE: 86 BPM | SYSTOLIC BLOOD PRESSURE: 103 MMHG | BODY MASS INDEX: 20.81 KG/M2 | WEIGHT: 121.91 LBS | DIASTOLIC BLOOD PRESSURE: 52 MMHG | OXYGEN SATURATION: 99 % | HEIGHT: 64 IN

## 2018-12-19 DIAGNOSIS — E87.2 HIGH ANION GAP METABOLIC ACIDOSIS: ICD-10-CM

## 2018-12-19 DIAGNOSIS — N17.9 ACUTE ON CHRONIC KIDNEY FAILURE (HCC): ICD-10-CM

## 2018-12-19 DIAGNOSIS — E86.0 DEHYDRATION: ICD-10-CM

## 2018-12-19 DIAGNOSIS — R65.20 SEVERE SEPSIS (HCC): Primary | ICD-10-CM

## 2018-12-19 DIAGNOSIS — A41.9 SEVERE SEPSIS (HCC): Primary | ICD-10-CM

## 2018-12-19 DIAGNOSIS — N17.9 AKI (ACUTE KIDNEY INJURY) (HCC): ICD-10-CM

## 2018-12-19 DIAGNOSIS — A41.9 SEPSIS (HCC): Primary | ICD-10-CM

## 2018-12-19 DIAGNOSIS — Z94.0 RENAL TRANSPLANT, STATUS POST: ICD-10-CM

## 2018-12-19 DIAGNOSIS — N18.9 ACUTE ON CHRONIC KIDNEY FAILURE (HCC): ICD-10-CM

## 2018-12-19 DIAGNOSIS — I21.4 NSTEMI (NON-ST ELEVATED MYOCARDIAL INFARCTION) (HCC): ICD-10-CM

## 2018-12-19 LAB
ALBUMIN SERPL BCP-MCNC: 2.4 G/DL (ref 3.5–5)
ALBUMIN SERPL BCP-MCNC: 2.7 G/DL (ref 3.5–5.7)
ALBUMIN SERPL BCP-MCNC: 2.8 G/DL (ref 3.5–5.7)
ALP SERPL-CCNC: 122 U/L (ref 40–150)
ALP SERPL-CCNC: 123 U/L (ref 40–150)
ALP SERPL-CCNC: 173 U/L (ref 46–116)
ALT SERPL W P-5'-P-CCNC: 10 U/L (ref 7–52)
ALT SERPL W P-5'-P-CCNC: 18 U/L (ref 12–78)
ALT SERPL W P-5'-P-CCNC: 9 U/L (ref 7–52)
ANION GAP SERPL CALCULATED.3IONS-SCNC: 17 MMOL/L (ref 4–13)
ANION GAP SERPL CALCULATED.3IONS-SCNC: 18 MMOL/L (ref 4–13)
ANION GAP SERPL CALCULATED.3IONS-SCNC: 23 MMOL/L (ref 4–13)
APTT PPP: 49 SECONDS (ref 26–38)
AST SERPL W P-5'-P-CCNC: 10 U/L (ref 13–39)
AST SERPL W P-5'-P-CCNC: 11 U/L (ref 13–39)
AST SERPL W P-5'-P-CCNC: 14 U/L (ref 5–45)
BASOPHILS # BLD AUTO: 0.03 THOUSANDS/ΜL (ref 0–0.1)
BASOPHILS NFR BLD AUTO: 0 % (ref 0–1)
BETA-HYDROXYBUTYRATE: 1.59 MMOL/L (ref 0.02–0.27)
BILIRUB SERPL-MCNC: 0.3 MG/DL (ref 0.2–1)
BILIRUB SERPL-MCNC: 0.3 MG/DL (ref 0.2–1)
BILIRUB SERPL-MCNC: 0.4 MG/DL (ref 0.2–1)
BUN SERPL-MCNC: 125 MG/DL (ref 7–25)
BUN SERPL-MCNC: 129 MG/DL (ref 5–25)
BUN SERPL-MCNC: 140 MG/DL (ref 7–25)
CALCIUM SERPL-MCNC: 7.6 MG/DL (ref 8.3–10.1)
CALCIUM SERPL-MCNC: 7.6 MG/DL (ref 8.6–10.5)
CALCIUM SERPL-MCNC: 7.7 MG/DL (ref 8.6–10.5)
CHLORIDE SERPL-SCNC: 89 MMOL/L (ref 100–108)
CHLORIDE SERPL-SCNC: 94 MMOL/L (ref 98–107)
CHLORIDE SERPL-SCNC: 96 MMOL/L (ref 98–107)
CK SERPL-CCNC: 88 U/L (ref 39–308)
CO2 SERPL-SCNC: 11 MMOL/L (ref 21–31)
CO2 SERPL-SCNC: 13 MMOL/L (ref 21–31)
CO2 SERPL-SCNC: 13 MMOL/L (ref 21–32)
CREAT SERPL-MCNC: 6.12 MG/DL (ref 0.7–1.3)
CREAT SERPL-MCNC: 6.2 MG/DL (ref 0.7–1.3)
CREAT SERPL-MCNC: 6.69 MG/DL (ref 0.6–1.3)
EOSINOPHIL # BLD AUTO: 0 THOUSAND/ΜL (ref 0–0.61)
EOSINOPHIL NFR BLD AUTO: 0 % (ref 0–6)
ERYTHROCYTE [DISTWIDTH] IN BLOOD BY AUTOMATED COUNT: 14.6 % (ref 11.6–15.1)
FLUAV AG SPEC QL IA: NEGATIVE
FLUBV AG SPEC QL IA: NEGATIVE
GFR SERPL CREATININE-BSD FRML MDRD: 10 ML/MIN/1.73SQ M
GFR SERPL CREATININE-BSD FRML MDRD: 10 ML/MIN/1.73SQ M
GFR SERPL CREATININE-BSD FRML MDRD: 9 ML/MIN/1.73SQ M
GLUCOSE SERPL-MCNC: 183 MG/DL (ref 65–140)
GLUCOSE SERPL-MCNC: 237 MG/DL (ref 65–140)
GLUCOSE SERPL-MCNC: 252 MG/DL (ref 65–140)
GLUCOSE SERPL-MCNC: 254 MG/DL (ref 65–99)
GLUCOSE SERPL-MCNC: 277 MG/DL (ref 65–140)
GLUCOSE SERPL-MCNC: 280 MG/DL (ref 65–140)
GLUCOSE SERPL-MCNC: 282 MG/DL (ref 65–99)
GLUCOSE SERPL-MCNC: 315 MG/DL (ref 65–140)
HCT VFR BLD AUTO: 26.2 % (ref 36.5–49.3)
HGB BLD-MCNC: 8.7 G/DL (ref 12–17)
IMM GRANULOCYTES # BLD AUTO: 0.12 THOUSAND/UL (ref 0–0.2)
IMM GRANULOCYTES NFR BLD AUTO: 1 % (ref 0–2)
INR PPP: 1.15 (ref 0.86–1.17)
LACTATE SERPL-SCNC: 1 MMOL/L (ref 0.5–2)
LYMPHOCYTES # BLD AUTO: 0.98 THOUSANDS/ΜL (ref 0.6–4.47)
LYMPHOCYTES NFR BLD AUTO: 5 % (ref 14–44)
MAGNESIUM SERPL-MCNC: 2.4 MG/DL (ref 1.6–2.6)
MCH RBC QN AUTO: 29.5 PG (ref 26.8–34.3)
MCHC RBC AUTO-ENTMCNC: 33.2 G/DL (ref 31.4–37.4)
MCV RBC AUTO: 89 FL (ref 82–98)
MONOCYTES # BLD AUTO: 1.39 THOUSAND/ΜL (ref 0.17–1.22)
MONOCYTES NFR BLD AUTO: 7 % (ref 4–12)
NEUTROPHILS # BLD AUTO: 16.97 THOUSANDS/ΜL (ref 1.85–7.62)
NEUTS SEG NFR BLD AUTO: 87 % (ref 43–75)
NRBC BLD AUTO-RTO: 0 /100 WBCS
PHOSPHATE SERPL-MCNC: 7.8 MG/DL (ref 2.7–4.5)
PLATELET # BLD AUTO: 347 THOUSANDS/UL (ref 149–390)
PMV BLD AUTO: 8.8 FL (ref 8.9–12.7)
POTASSIUM SERPL-SCNC: 3.4 MMOL/L (ref 3.5–5.3)
POTASSIUM SERPL-SCNC: 3.8 MMOL/L (ref 3.5–5.5)
POTASSIUM SERPL-SCNC: 3.9 MMOL/L (ref 3.5–5.5)
PROCALCITONIN SERPL-MCNC: 35.23 NG/ML
PROT SERPL-MCNC: 5.7 G/DL (ref 6.4–8.9)
PROT SERPL-MCNC: 6 G/DL (ref 6.4–8.9)
PROT SERPL-MCNC: 7 G/DL (ref 6.4–8.2)
PROTHROMBIN TIME: 14.2 SECONDS (ref 11.8–14.2)
RBC # BLD AUTO: 2.95 MILLION/UL (ref 3.88–5.62)
SODIUM SERPL-SCNC: 124 MMOL/L (ref 134–143)
SODIUM SERPL-SCNC: 125 MMOL/L (ref 134–143)
SODIUM SERPL-SCNC: 125 MMOL/L (ref 136–145)
TROPONIN I SERPL-MCNC: 0.25 NG/ML
TROPONIN I SERPL-MCNC: 0.26 NG/ML
TROPONIN I SERPL-MCNC: 0.38 NG/ML
WBC # BLD AUTO: 19.49 THOUSAND/UL (ref 4.31–10.16)

## 2018-12-19 PROCEDURE — 84484 ASSAY OF TROPONIN QUANT: CPT | Performed by: INTERNAL MEDICINE

## 2018-12-19 PROCEDURE — 80053 COMPREHEN METABOLIC PANEL: CPT | Performed by: INTERNAL MEDICINE

## 2018-12-19 PROCEDURE — 96374 THER/PROPH/DIAG INJ IV PUSH: CPT

## 2018-12-19 PROCEDURE — 83735 ASSAY OF MAGNESIUM: CPT | Performed by: EMERGENCY MEDICINE

## 2018-12-19 PROCEDURE — 83605 ASSAY OF LACTIC ACID: CPT | Performed by: EMERGENCY MEDICINE

## 2018-12-19 PROCEDURE — 85025 COMPLETE CBC W/AUTO DIFF WBC: CPT | Performed by: EMERGENCY MEDICINE

## 2018-12-19 PROCEDURE — 71046 X-RAY EXAM CHEST 2 VIEWS: CPT

## 2018-12-19 PROCEDURE — 82948 REAGENT STRIP/BLOOD GLUCOSE: CPT

## 2018-12-19 PROCEDURE — 96366 THER/PROPH/DIAG IV INF ADDON: CPT

## 2018-12-19 PROCEDURE — 84100 ASSAY OF PHOSPHORUS: CPT | Performed by: EMERGENCY MEDICINE

## 2018-12-19 PROCEDURE — 96375 TX/PRO/DX INJ NEW DRUG ADDON: CPT

## 2018-12-19 PROCEDURE — 96361 HYDRATE IV INFUSION ADD-ON: CPT

## 2018-12-19 PROCEDURE — 36415 COLL VENOUS BLD VENIPUNCTURE: CPT | Performed by: EMERGENCY MEDICINE

## 2018-12-19 PROCEDURE — 87147 CULTURE TYPE IMMUNOLOGIC: CPT | Performed by: EMERGENCY MEDICINE

## 2018-12-19 PROCEDURE — 87631 RESP VIRUS 3-5 TARGETS: CPT | Performed by: EMERGENCY MEDICINE

## 2018-12-19 PROCEDURE — 84484 ASSAY OF TROPONIN QUANT: CPT | Performed by: EMERGENCY MEDICINE

## 2018-12-19 PROCEDURE — 99236 HOSP IP/OBS SAME DATE HI 85: CPT | Performed by: INTERNAL MEDICINE

## 2018-12-19 PROCEDURE — 76776 US EXAM K TRANSPL W/DOPPLER: CPT

## 2018-12-19 PROCEDURE — 85730 THROMBOPLASTIN TIME PARTIAL: CPT | Performed by: EMERGENCY MEDICINE

## 2018-12-19 PROCEDURE — 80053 COMPREHEN METABOLIC PANEL: CPT | Performed by: EMERGENCY MEDICINE

## 2018-12-19 PROCEDURE — 87186 SC STD MICRODIL/AGAR DIL: CPT | Performed by: EMERGENCY MEDICINE

## 2018-12-19 PROCEDURE — 84145 PROCALCITONIN (PCT): CPT | Performed by: EMERGENCY MEDICINE

## 2018-12-19 PROCEDURE — 99285 EMERGENCY DEPT VISIT HI MDM: CPT

## 2018-12-19 PROCEDURE — 96365 THER/PROPH/DIAG IV INF INIT: CPT

## 2018-12-19 PROCEDURE — 93005 ELECTROCARDIOGRAM TRACING: CPT

## 2018-12-19 PROCEDURE — G0426 INPT/ED TELECONSULT50: HCPCS | Performed by: ANESTHESIOLOGY

## 2018-12-19 PROCEDURE — 82550 ASSAY OF CK (CPK): CPT | Performed by: EMERGENCY MEDICINE

## 2018-12-19 PROCEDURE — 87040 BLOOD CULTURE FOR BACTERIA: CPT | Performed by: EMERGENCY MEDICINE

## 2018-12-19 PROCEDURE — 85610 PROTHROMBIN TIME: CPT | Performed by: EMERGENCY MEDICINE

## 2018-12-19 PROCEDURE — 82010 KETONE BODYS QUAN: CPT | Performed by: INTERNAL MEDICINE

## 2018-12-19 PROCEDURE — 80197 ASSAY OF TACROLIMUS: CPT | Performed by: INTERNAL MEDICINE

## 2018-12-19 RX ORDER — DIPHENHYDRAMINE HYDROCHLORIDE 50 MG/ML
12.5 INJECTION INTRAMUSCULAR; INTRAVENOUS ONCE
Status: COMPLETED | OUTPATIENT
Start: 2018-12-19 | End: 2018-12-19

## 2018-12-19 RX ORDER — POTASSIUM CHLORIDE 20 MEQ/1
20 TABLET, EXTENDED RELEASE ORAL ONCE
Status: COMPLETED | OUTPATIENT
Start: 2018-12-19 | End: 2018-12-19

## 2018-12-19 RX ORDER — PREDNISONE 10 MG/1
5 TABLET ORAL DAILY
Status: DISCONTINUED | OUTPATIENT
Start: 2018-12-20 | End: 2018-12-20 | Stop reason: HOSPADM

## 2018-12-19 RX ORDER — SODIUM BICARBONATE 650 MG/1
650 TABLET ORAL 2 TIMES DAILY
Status: DISCONTINUED | OUTPATIENT
Start: 2018-12-19 | End: 2018-12-20 | Stop reason: HOSPADM

## 2018-12-19 RX ORDER — NIFEDIPINE 30 MG/1
30 TABLET, FILM COATED, EXTENDED RELEASE ORAL DAILY
COMMUNITY
End: 2018-12-20 | Stop reason: HOSPADM

## 2018-12-19 RX ORDER — ASPIRIN 81 MG/1
81 TABLET ORAL DAILY
Status: DISCONTINUED | OUTPATIENT
Start: 2018-12-19 | End: 2018-12-20 | Stop reason: HOSPADM

## 2018-12-19 RX ORDER — METOCLOPRAMIDE HYDROCHLORIDE 5 MG/ML
5 INJECTION INTRAMUSCULAR; INTRAVENOUS EVERY 6 HOURS PRN
Status: DISCONTINUED | OUTPATIENT
Start: 2018-12-19 | End: 2018-12-20 | Stop reason: HOSPADM

## 2018-12-19 RX ORDER — CALCITRIOL 0.25 UG/1
0.5 CAPSULE, LIQUID FILLED ORAL DAILY
Status: DISCONTINUED | OUTPATIENT
Start: 2018-12-20 | End: 2018-12-20 | Stop reason: HOSPADM

## 2018-12-19 RX ORDER — ASPIRIN 81 MG/1
324 TABLET, CHEWABLE ORAL ONCE
Status: COMPLETED | OUTPATIENT
Start: 2018-12-19 | End: 2018-12-19

## 2018-12-19 RX ORDER — DOXYCYCLINE HYCLATE 100 MG/1
100 CAPSULE ORAL EVERY 12 HOURS SCHEDULED
Refills: 0
Start: 2018-12-19 | End: 2018-12-26

## 2018-12-19 RX ORDER — CEFEPIME HYDROCHLORIDE 1 G/50ML
1000 INJECTION, SOLUTION INTRAVENOUS EVERY 12 HOURS
Status: DISCONTINUED | OUTPATIENT
Start: 2018-12-19 | End: 2018-12-19

## 2018-12-19 RX ORDER — ONDANSETRON 2 MG/ML
4 INJECTION INTRAMUSCULAR; INTRAVENOUS EVERY 6 HOURS PRN
Status: DISCONTINUED | OUTPATIENT
Start: 2018-12-19 | End: 2018-12-20 | Stop reason: HOSPADM

## 2018-12-19 RX ORDER — CEFEPIME HYDROCHLORIDE 1 G/50ML
1000 INJECTION, SOLUTION INTRAVENOUS EVERY 24 HOURS
Status: DISCONTINUED | OUTPATIENT
Start: 2018-12-20 | End: 2018-12-20 | Stop reason: HOSPADM

## 2018-12-19 RX ORDER — HEPARIN SODIUM 5000 [USP'U]/ML
5000 INJECTION, SOLUTION INTRAVENOUS; SUBCUTANEOUS EVERY 8 HOURS SCHEDULED
Qty: 1 ML | Refills: 0
Start: 2018-12-19 | End: 2019-02-15 | Stop reason: ALTCHOICE

## 2018-12-19 RX ORDER — ACETAMINOPHEN 325 MG/1
650 TABLET ORAL EVERY 6 HOURS PRN
Status: DISCONTINUED | OUTPATIENT
Start: 2018-12-19 | End: 2018-12-20 | Stop reason: HOSPADM

## 2018-12-19 RX ORDER — HEPARIN SODIUM 5000 [USP'U]/ML
5000 INJECTION, SOLUTION INTRAVENOUS; SUBCUTANEOUS EVERY 8 HOURS SCHEDULED
Status: DISCONTINUED | OUTPATIENT
Start: 2018-12-19 | End: 2018-12-20 | Stop reason: HOSPADM

## 2018-12-19 RX ORDER — CEFEPIME HYDROCHLORIDE 1 G/1
1000 INJECTION, POWDER, FOR SOLUTION INTRAMUSCULAR; INTRAVENOUS EVERY 12 HOURS
Status: DISCONTINUED | OUTPATIENT
Start: 2018-12-19 | End: 2018-12-19

## 2018-12-19 RX ORDER — CEFEPIME HYDROCHLORIDE 1 G/50ML
1000 INJECTION, SOLUTION INTRAVENOUS EVERY 12 HOURS
Refills: 0
Start: 2018-12-20 | End: 2018-12-27

## 2018-12-19 RX ORDER — ATORVASTATIN CALCIUM 40 MG/1
40 TABLET, FILM COATED ORAL DAILY
Status: DISCONTINUED | OUTPATIENT
Start: 2018-12-19 | End: 2018-12-20 | Stop reason: HOSPADM

## 2018-12-19 RX ORDER — SODIUM CHLORIDE 9 MG/ML
125 INJECTION, SOLUTION INTRAVENOUS CONTINUOUS
Refills: 0
Start: 2018-12-19 | End: 2019-02-15 | Stop reason: ALTCHOICE

## 2018-12-19 RX ORDER — DOXYCYCLINE HYCLATE 50 MG/1
100 CAPSULE ORAL EVERY 12 HOURS SCHEDULED
Status: DISCONTINUED | OUTPATIENT
Start: 2018-12-19 | End: 2018-12-20 | Stop reason: HOSPADM

## 2018-12-19 RX ORDER — SODIUM CHLORIDE 9 MG/ML
150 INJECTION, SOLUTION INTRAVENOUS CONTINUOUS
Status: DISCONTINUED | OUTPATIENT
Start: 2018-12-19 | End: 2018-12-19

## 2018-12-19 RX ORDER — METOCLOPRAMIDE HYDROCHLORIDE 5 MG/ML
5 INJECTION INTRAMUSCULAR; INTRAVENOUS ONCE
Status: COMPLETED | OUTPATIENT
Start: 2018-12-19 | End: 2018-12-19

## 2018-12-19 RX ORDER — ONDANSETRON 2 MG/ML
4 INJECTION INTRAMUSCULAR; INTRAVENOUS ONCE
Status: COMPLETED | OUTPATIENT
Start: 2018-12-19 | End: 2018-12-19

## 2018-12-19 RX ADMIN — DOXYCYCLINE HYCLATE 100 MG: 50 CAPSULE, GELATIN COATED ORAL at 21:47

## 2018-12-19 RX ADMIN — ONDANSETRON 4 MG: 2 INJECTION INTRAMUSCULAR; INTRAVENOUS at 14:14

## 2018-12-19 RX ADMIN — Medication 75 ML/HR: at 21:34

## 2018-12-19 RX ADMIN — DIPHENHYDRAMINE HYDROCHLORIDE 12.5 MG: 50 INJECTION, SOLUTION INTRAMUSCULAR; INTRAVENOUS at 10:26

## 2018-12-19 RX ADMIN — ATORVASTATIN CALCIUM 40 MG: 40 TABLET, FILM COATED ORAL at 13:58

## 2018-12-19 RX ADMIN — METOCLOPRAMIDE 5 MG: 5 INJECTION, SOLUTION INTRAMUSCULAR; INTRAVENOUS at 15:19

## 2018-12-19 RX ADMIN — ASPIRIN 81 MG: 81 TABLET, COATED ORAL at 13:58

## 2018-12-19 RX ADMIN — ACETAMINOPHEN 650 MG: 325 TABLET ORAL at 17:54

## 2018-12-19 RX ADMIN — SODIUM BICARBONATE 650 MG TABLET 650 MG: at 17:54

## 2018-12-19 RX ADMIN — INSULIN LISPRO 3 UNITS: 100 INJECTION, SOLUTION INTRAVENOUS; SUBCUTANEOUS at 14:00

## 2018-12-19 RX ADMIN — CEFEPIME HYDROCHLORIDE 1000 MG: 1 INJECTION, SOLUTION INTRAVENOUS at 13:59

## 2018-12-19 RX ADMIN — INSULIN LISPRO 5 UNITS: 100 INJECTION, SOLUTION INTRAVENOUS; SUBCUTANEOUS at 17:55

## 2018-12-19 RX ADMIN — SODIUM CHLORIDE 1000 ML: 0.9 INJECTION, SOLUTION INTRAVENOUS at 08:40

## 2018-12-19 RX ADMIN — HEPARIN SODIUM 5000 UNITS: 5000 INJECTION INTRAVENOUS; SUBCUTANEOUS at 14:02

## 2018-12-19 RX ADMIN — POTASSIUM CHLORIDE 20 MEQ: 1500 TABLET, EXTENDED RELEASE ORAL at 09:19

## 2018-12-19 RX ADMIN — SODIUM CHLORIDE 1000 ML: 0.9 INJECTION, SOLUTION INTRAVENOUS at 10:25

## 2018-12-19 RX ADMIN — DOXYCYCLINE HYCLATE 100 MG: 50 CAPSULE, GELATIN COATED ORAL at 15:19

## 2018-12-19 RX ADMIN — ASPIRIN 81 MG 324 MG: 81 TABLET ORAL at 09:19

## 2018-12-19 RX ADMIN — ONDANSETRON 4 MG: 2 INJECTION, SOLUTION INTRAMUSCULAR; INTRAVENOUS at 09:17

## 2018-12-19 RX ADMIN — SODIUM CHLORIDE 10 UNITS/HR: 9 INJECTION, SOLUTION INTRAVENOUS at 21:38

## 2018-12-19 RX ADMIN — METOCLOPRAMIDE 5 MG: 5 INJECTION, SOLUTION INTRAMUSCULAR; INTRAVENOUS at 10:25

## 2018-12-19 RX ADMIN — VANCOMYCIN HYDROCHLORIDE 750 MG: 750 INJECTION, POWDER, LYOPHILIZED, FOR SOLUTION INTRAVENOUS at 09:28

## 2018-12-19 RX ADMIN — SODIUM CHLORIDE 125 ML/HR: 9 INJECTION, SOLUTION INTRAVENOUS at 13:29

## 2018-12-19 RX ADMIN — METOCLOPRAMIDE 5 MG: 5 INJECTION, SOLUTION INTRAMUSCULAR; INTRAVENOUS at 22:54

## 2018-12-19 RX ADMIN — HEPARIN SODIUM 5000 UNITS: 5000 INJECTION INTRAVENOUS; SUBCUTANEOUS at 21:21

## 2018-12-19 NOTE — UTILIZATION REVIEW
Initial Clinical Review      12/19  @  [id number]  Arrived at   [address] [facility]  ER   59-year-old male with a history of renal failure status post transplant in 2001 (History of 2 transplants), diabetes, hypertension, cardiac arrest February 2018, coronary artery disease status post 2 stents, chronic right osteomyelitis of the ankle      -  today complaining of generalized weakness  - pt was in ER yesterday for c/o right ankle pain, xray neg,  Dc to home   -  Continues to feel generalized weakness and fatigue  - has been trying to drink more, urinating well without any dysuria  - denies fever but does complain of chills  · -   Removed  patient's MAFO to examine Rt ankle/foot -  Erythema noted  (recent surgery on Rt ankle and was treated with prolonged course of doxycycline for corynebacterium striatum  Recently, patient had a trauma to the ankle and had been complaining of pain, with protruding screws)     98 5   93   20   107/53    98% ra    wgt 53 1 kg     WBC  19 49,   Hgb  8 7 ,  Trop  0 38,  Na  125,  K  3 4,  Phos  7 8,  An gap  23,  CO2 =  13  crt  6 69,  BUN  129,  GFR  9,  Ca  7 6,  Glucose  280  cxr - nad  US kidney w/doppler -  good blood flow to transplant; however high resistance concerning for failure  IN ER given  IVF NS 2 liter bolus,  IV vanco, IV cefepime,  IV zofran,  IV reglan,  IV benadryl,   PO asa and Kdur     9:13 AM    Ashutosh Rich Patient with multi-system failure  He does have leukocytosis without bands, acute on chronic kidney injury, hyponatremia, anion gap acidosis resume from diabetes  He does appear dry   Ashutosh Rich    patient does have NSTEMI  Aspirin ordered  He is hypotensive   2 L IV fluids ordered  TRANSFER to [facility] for  ICU CARE       Ashutosh Rich [facility] ICU  On  12/19  @  1219    Past Medical/Surgical History:    Active Ambulatory Problems     Diagnosis Date Noted    Osteomyelitis ([facility] ) 10/26/2016    Foot pain 10/26/2016    Hypertension 10/26/2016    Type 1 diabetes mellitus ([place] [address] ) 10/26/2016    Renal transplant, status post 10/26/2016    Hyperkalemia 10/26/2016    MIKE (acute kidney injury) ([facility] ) 10/27/2016    Osteomyelitis ([place] [address] ) 12/07/2016    Poor circulation 12/07/2016    Closed fracture of distal end of right tibia with routine healing 07/03/2017    Elevated platelet count 69/35/6792    Chronic kidney disease, stage IV (severe) ([place] [address] ) 09/23/2017    Chronic osteomyelitis of tibia ([place] [address] ) 09/23/2017    Immunosuppression ([place] [address] ) 11/04/2014    Hypertension 08/04/2010    DKA (diabetic ketoacidoses) ([place] [address] ) 09/23/2017    Elevated troponin 09/23/2017    Diarrhea 09/23/2017    Elevated INR 09/23/2017    Cellulitis of ankle 09/23/2017    Non-ST elevation myocardial infarction (NSTEMI), type 2 09/23/2017    Urinary retention 09/24/2017    Abnormal x-ray 09/24/2017     Resolved Ambulatory Problems     Diagnosis Date Noted    Cellulitis 10/26/2016    Sepsis ([place] [address] ) 10/27/2016    Pain from implanted hardware 08/17/2017     Past Medical History:   Diagnosis Date    Cardiac arrest ([place] [address] )     Diabetes mellitus ([place] [address] )     Diabetes mellitus type 1 ([place] [address] )     Hypertension     Infection at site of external fixator pin ([place] [address] )     MI (myocardial infarction) ([place] [address] )     Pneumonia     Renal failure     Renal transplant, status post 07/21/2007       Admitting Diagnosis: Sepsis ([place] [address] ) [A41 9]    Assessment/Plan:   · Severe Sepsis likely due to infection Rt ankle/ possible osteomyelitis -   vancomycin/cefepime/doxycycline pending culture results;  Continue IV fluid resuscitation  Nstemi T2  Due to demand ischemis 2/2 sepsis  MIKE - s/p renal transplant:   Monitor indices,  Hold tacrolimus, imuran, cont prednisone  T1DM -  Check beta hydroxybutyrate, cont home insulin  regimen +  Sliding scale insulin     Admission Orders:  Admit icu  Vibramycin po q 12  IV cefepime IV q 12 Renal consult   Asa, lipitor, calcitriol, na bicarb tabs,  tylenol prn  IVF NS @  150  IV reglan prn -  Given @  1519  IV Zofran prn -  Given  @  1414  Levemir sq 10 U q 12  Accucks before meals and HS w/SSI     Scheduled Meds:   Current Facility-Administered Medications:  acetaminophen 650 mg Oral Q6H PRN Lendon Aase, MD    aspirin 81 mg Oral Daily Lendon Aase, MD    atorvastatin 40 mg Oral Daily Lendon Aase, MD    [START ON 12/20/2018] calcitriol 0 5 mcg Oral Daily Lendon Aase, MD    cefepime 1,000 mg Intravenous Q12H Lendon Aase, MD Last Rate: 1,000 mg (12/19/18 1359)   doxycycline hyclate 100 mg Oral Q12H [address] Lendon Aase, MD    heparin (porcine) 5,000 Units Subcutaneous [facility] Lendon Aase, MD    insulin detemir 10 Units Subcutaneous Q12H [address] [facility] MD Munira    insulin lispro 1-5 Units Subcutaneous HS Lendon Aase, MD    insulin lispro 1-6 Units Subcutaneous TID [facility] Lendon Aase, MD    metoclopramide 5 mg Intravenous Q6H PRN Lendon Aase, MD    ondansetron 4 mg Intravenous Q6H PRN Lendon Aase, MD Charleen Blank ON 12/20/2018] predniSONE 5 mg Oral Daily Lendon Aase, MD    sodium bicarbonate 650 mg Oral BID Lendon Aase, MD    sodium chloride 150 mL/hr Intravenous Continuous Lendon Aase, MD Last Rate: 150 mL/hr (12/19/18 6595)     Due to complicated h/o renal transplant, recurrent infections and osteomyelitis,  WILL TRANSFER PT TO [address] [place] [address] [facility]  For nephrology services       12/20  @  8936  Pt discharged from [address] [place] [address]

## 2018-12-19 NOTE — PLAN OF CARE
Problem: GASTROINTESTINAL - ADULT  Goal: Minimal or absence of nausea and/or vomiting  INTERVENTIONS:  - Administer IV fluids as ordered to ensure adequate hydration  - Maintain NPO status until nausea and vomiting are resolved  - Nasogastric tube as ordered  - Administer ordered antiemetic medications as needed  - Provide nonpharmacologic comfort measures as appropriate  - Advance diet as tolerated, if ordered  - Nutrition services referral to assist patient with adequate nutrition and appropriate food choices  Outcome: Progressing  Patient expresses relief from Reglan

## 2018-12-19 NOTE — ED PROVIDER NOTES
History  Chief Complaint   Patient presents with    Weakness - Generalized     Was sen yesterday for Right ankle  Today c/o weakness  Patient is a 54-year-old male with a history of renal failure status post transplant in 2001 at [address] [place] [address] (history of 2 transplants), diabetes, hypertension, cardiac arrest February 2018, coronary artery disease status post 2 stents, chronic right osteomyelitis of the ankle followed at [facility] status post multiple surgeries in today complaining of generalized weakness  He states he was here yesterday for right ankle pain  He reports that after leaving here in going home he fed generalized weakness and fatigue  Headache, changes in vision, tinnitus or sore throat  He had decrease in appetite  Eating and drinking did not cause any pain  He states this is been tempting to drink more and has been urinating well without any dysuria  He has no melena or bright red blood per rectum  Patient has no fevers but does complain of chills  He did get the flu shot  He still thinks that he has the influenza  Attacks, recent travel recent surgery in the past 6 weeks  Patient did not take anything for this pain  His medications have remain the same except for nifedipine which was newly added          Mary Dub 36        History provided by:  Patient and spouse   used: No    Fatigue   Severity:  Mild  Onset quality:  Gradual  Timing:  Constant  Progression:  Unchanged  Chronicity:  New  Context: not alcohol use, not allergies, not dehydration, not drug use, not increased activity, not pinched nerve, not recent infection, not stress and not urinary tract infection    Relieved by:  None tried  Worsened by:  Nothing  Ineffective treatments:  None tried  Associated symptoms: myalgias    Associated symptoms: no abdominal pain, no anorexia, no aphasia, no arthralgias, no ataxia, no chest pain, no cough, no diarrhea, no difficulty walking, no dizziness, no drooling, no dysphagia, no dysuria, no numbness in extremities, no falls, no fever, no foul-smelling urine, no frequency, no headaches, no hematochezia, no lethargy, no loss of consciousness, no melena, no nausea, no near-syncope, no seizures, no sensory-motor deficit, no shortness of breath, no stroke symptoms, no syncope, no urgency, no vision change and no vomiting    Risk factors: coronary artery disease, diabetes, heart disease and new medications    Risk factors: no anemia, no congestive heart failure, no neurologic disease and no recent stressors        Prior to Admission Medications   Prescriptions Last Dose Informant Patient Reported? Taking?    Lactobacillus (ACIDOPHILUS PO)   Yes No   Sig: Take 300 mg by mouth   NIFEdipine (PROCARDIA PO)   Yes Yes   Sig: Take 1 capsule by mouth daily   aspirin (ECOTRIN LOW STRENGTH) 81 mg EC tablet   Yes No   Sig: Take 81 mg by mouth daily   atorvastatin (LIPITOR) 40 mg tablet   Yes No   Sig: Take 40 mg by mouth daily   azaTHIOprine (IMURAN) 50 mg tablet   Yes No   Sig: Take 50 mg by mouth daily   calcitriol (ROCALTROL) 0 5 MCG capsule   Yes No   Sig: Take 0 5 mcg by mouth daily   insulin detemir (LEVEMIR) 100 units/mL subcutaneous injection   Yes No   Sig: Inject 10 Units under the skin 2 (two) times a day   insulin lispro (HUMALOG) 100 units/mL injection   No No   Sig: Inject 30 Units under the skin daily   metoprolol succinate (TOPROL-XL) 25 mg 24 hr tablet   Yes No   Sig: Take 25 mg by mouth daily   multivitamin (THERAGRAN) TABS   Yes No   Sig: Take 1 tablet by mouth daily   predniSONE 5 mg tablet   Yes No   Sig: Take 5 mg by mouth daily   sodium bicarbonate 650 mg tablet   No No   Sig: Take 1 tablet by mouth 2 (two) times a day   tacrolimus (PROGRAF) 0 5 mg capsule  Self Yes No   Sig: Take 0 5 mg by mouth daily In the AM and 1mg hs       Facility-Administered Medications: None       Past Medical History:   Diagnosis Date    [facility]     Diabetes mellitus ([facility] )     [facility]     Hypertension     Infection at site of external fixator pin ([facility]     MI (myocardial infarction) ([facility] )     Pneumonia     Last Assessed [id number]    Renal failure     Renal transplant, status post 07/21/2007       Past Surgical History:   Procedure Laterality Date    ANKLE FRACTURE SURGERY      ANKLE HARDWARE REMOVAL Right 7/31/2017    Procedure: REMOVAL HARDWARE ANKLE;  Surgeon: Amadeo Torres MD;  [facility];  Service: Orthopedics    ANKLE HARDWARE REMOVAL Right 8/17/2017    Procedure: TIBIA FAILED HARDWARE REMOVAL;  Surgeon: Amadeo Torres MD;  [facility];  Service: Orthopedics    CARDIAC SURGERY      2 stents    CLOSED REDUCTION ANKLE Right 7/3/2017    Procedure: CLOSED REDUCTION DISTAL TIB-FIB AND CASTING VS;  Surgeon: Amadeo Torres MD;  [facility];  Service: Orthopedics    EYE SURGERY      FRACTURE SURGERY      ORIF Rt Ankle    GLUTAMIC ACID DECARBOXYLASE (HISTORICAL)      IR PICC LINE  8/20/2018    IR VENOUS LINE REMOVAL  10/5/2018    NEPHRECTOMY TRANSPLANTED ORGAN      KS OPEN TREATMENT FRACTURE DISTAL TIBIA FIBULA Right 7/3/2017    Procedure: OPEN REDUCTION W/ INTERNAL FIXATION (ORIF); Surgeon: Amadeo Torres MD;  [facility];  Service: Orthopedics    TOE AMPUTATION Right 10/27/2016    Procedure: AMPUTATION TOE;  Surgeon: Pauline Olivier DPM;  Location: [facility];  Service:        Family History   Problem Relation Age of Onset    Diabetes Brother     Coronary artery disease Mother      I have reviewed and agree with the history as documented  Social History   Substance Use Topics    Smoking status: Never Smoker    Smokeless tobacco: Never Used    Alcohol use No        Review of Systems   Constitutional: Positive for fatigue  Negative for diaphoresis and fever  HENT: Negative for drooling, ear pain and sore throat  Eyes: Negative for visual disturbance     Respiratory: Negative for cough, chest tightness and shortness of breath  Cardiovascular: Negative for chest pain, palpitations, syncope and near-syncope  Gastrointestinal: Negative for abdominal pain, anorexia, diarrhea, dysphagia, hematochezia, melena, nausea and vomiting  Genitourinary: Negative for difficulty urinating, dysuria, frequency and urgency  Musculoskeletal: Positive for myalgias  Negative for arthralgias, back pain, falls and neck pain  Skin: Negative for rash  Neurological: Negative for dizziness, seizures, loss of consciousness, weakness and headaches  Psychiatric/Behavioral: Negative for confusion  All other systems reviewed and are negative  Physical Exam  Physical Exam   Constitutional: He is oriented to person, place, and time  He appears well-developed  No distress  Appears older than stated age   HENT:   Head: Normocephalic and atraumatic  Dry mucous membranes  Patient maintaining airway maintaining secretions  Uvula midline  No posterior pharyngeal erythema or exudates  Eyes: Pupils are equal, round, and reactive to light  Conjunctivae and EOM are normal    Neck: Normal range of motion  Neck supple  Cardiovascular: Normal rate, regular rhythm, normal heart sounds and intact distal pulses  No murmur heard  Pulmonary/Chest: Effort normal and breath sounds normal  No stridor  No respiratory distress  Abdominal: Soft  Bowel sounds are normal  He exhibits no distension  There is no tenderness  Musculoskeletal: Normal range of motion  He exhibits no edema  Right lower extremity MAFO   Neurological: He is alert and oriented to person, place, and time  He displays normal reflexes  No cranial nerve deficit or sensory deficit  He exhibits normal muscle tone  Coordination normal    Skin: Skin is warm  Capillary refill takes less than 2 seconds  He is not diaphoretic  Nursing note and vitals reviewed        Vital Signs  ED Triage Vitals   Temperature Pulse Respirations Blood Pressure SpO2   12/19/18 0803 12/19/18 0803 12/19/18 0803 12/19/18 0805 12/19/18 0803   98 5 °F (36 9 °C) 93 20 107/53 98 %      Temp Source Heart Rate Source Patient Position - Orthostatic VS BP Location FiO2 (%)   12/19/18 0803 12/19/18 0803 12/19/18 0805 12/19/18 0805 --   Temporal Monitor Lying Right arm       Pain Score       12/19/18 0803       5           Vitals:    12/19/18 0803 12/19/18 0805 12/19/18 0930   BP:  107/53 103/52   Pulse: 93  86   Patient Position - Orthostatic VS:  Lying Lying       Visual Acuity      ED Medications  Medications   sodium chloride 0 9 % bolus 1,000 mL (1,000 mL Intravenous New Bag 12/19/18 1025)   vancomycin (VANCOCIN) 750 mg in sodium chloride 0 9 % 250 mL IVPB (750 mg Intravenous New Bag 12/19/18 0928)   cefepime (MAXIPIME) 500 mg in sodium chloride 0 9 % 50 mL IVPB (not administered)   sodium chloride 0 9 % bolus 1,000 mL (1,000 mL Intravenous New Bag 12/19/18 0840)   ondansetron (ZOFRAN) injection 4 mg (4 mg Intravenous Given 12/19/18 0917)   potassium chloride (K-DUR,KLOR-CON) CR tablet 20 mEq (20 mEq Oral Given 12/19/18 0919)   aspirin chewable tablet 324 mg (324 mg Oral Given 12/19/18 0919)   metoclopramide (REGLAN) injection 5 mg (5 mg Intravenous Given 12/19/18 1025)   diphenhydrAMINE (BENADRYL) injection 12 5 mg (12 5 mg Intravenous Given 12/19/18 1026)       Diagnostic Studies  Results Reviewed     Procedure Component Value Units Date/Time    Troponin I [[id number]]     Lab Status:  No result Specimen:  Blood     CK (with reflex to MB) [[id number]]  (Normal) Collected:  12/19/18 0828    Lab Status:  Final result Specimen:  Blood from Arm, Right Updated:  12/19/18 0932     Total CK 88 U/L     Procalcitonin [[id number]] Collected:  12/19/18 0828    Lab Status:   In process Specimen:  Blood Updated:  12/19/18 0931    Beta Hydroxybutyrate [[id number]]     Lab Status:  No result Specimen:  Blood     Acetone [[id number]]     Lab Status:  No result Specimen:  Blood Blood culture #2 [[id number]] Collected:  12/19/18 0830    Lab Status: In process Specimen:  Blood from Arm, Right Updated:  12/19/18 0915    Blood culture #1 [[id number]] Collected:  12/19/18 0913    Lab Status: In process Specimen:  Blood from Arm, Left Updated:  12/19/18 0915    Troponin I [[id number]]  (Abnormal) Collected:  12/19/18 0828    Lab Status:  Final result Specimen:  Blood from Arm, Right Updated:  12/19/18 0910     Troponin I 0 38 (HH) ng/mL     Rapid Influenza Screen with Reflex PCR [[id number]]  (Normal) Collected:  12/19/18 0828    Lab Status:  Final result Specimen:  Nasopharyngeal from Nasopharyngeal Swab Updated:  12/19/18 0858     Rapid Influenza A Ag Negative     Rapid Influenza B Ag Negative    Influenza A/B and RSV by PCR [[id number]] Collected:  12/19/18 5048    Lab Status: In process Specimen:  Nasopharyngeal from Nasopharyngeal Swab Updated:  12/19/18 0858    Comprehensive metabolic panel [[id number]]  (Abnormal) Collected:  12/19/18 0828    Lab Status:  Final result Specimen:  Blood from Arm, Right Updated:  12/19/18 0856     Sodium 125 (L) mmol/L      Potassium 3 4 (L) mmol/L      Chloride 89 (L) mmol/L      CO2 13 (L) mmol/L      ANION GAP 23 (H) mmol/L       (H) mg/dL      Creatinine 6 69 (H) mg/dL      Glucose 280 (H) mg/dL      Calcium 7 6 (L) mg/dL      AST 14 U/L      ALT 18 U/L      Alkaline Phosphatase 173 (H) U/L      Total Protein 7 0 g/dL      Albumin 2 4 (L) g/dL      Total Bilirubin 0 40 mg/dL      eGFR 9 ml/min/1 73sq m     Narrative:         National Kidney Disease Education Program recommendations are as follows:  GFR calculation is accurate only with a steady state creatinine  Chronic Kidney disease less than 60 ml/min/1 73 sq  meters  Kidney failure less than 15 ml/min/1 73 sq  meters      Phosphorus [[id number]]  (Abnormal) Collected:  12/19/18 0828    Lab Status:  Final result Specimen:  Blood from Arm, Right Updated:  12/19/18 0856     Phosphorus 7 8 (H) mg/dL Magnesium [[id number]]  (Normal) Collected:  12/19/18 0828    Lab Status:  Final result Specimen:  Blood from Arm, Right Updated:  12/19/18 0856     Magnesium 2 4 mg/dL     Protime-INR [[id number]]  (Normal) Collected:  12/19/18 0828    Lab Status:  Final result Specimen:  Blood from Arm, Right Updated:  12/19/18 0854     Protime 14 2 seconds      INR 1 15    APTT [[id number]]  (Abnormal) Collected:  12/19/18 0828    Lab Status:  Final result Specimen:  Blood from Arm, Right Updated:  12/19/18 0854     PTT 49 (H) seconds     Lactic acid, plasma [[id number]]  (Normal) Collected:  12/19/18 0828    Lab Status:  Final result Specimen:  Blood from Arm, Right Updated:  12/19/18 0854     LACTIC ACID 1 0 mmol/L     Narrative:         Result may be elevated if tourniquet was used during collection  CBC and differential [[id number]]  (Abnormal) Collected:  12/19/18 0828    Lab Status:  Final result Specimen:  Blood from Arm, Right Updated:  12/19/18 0836     WBC 19 49 (H) Thousand/uL      RBC 2 95 (L) Million/uL      Hemoglobin 8 7 (L) g/dL      Hematocrit 26 2 (L) %      MCV 89 fL      MCH 29 5 pg      MCHC 33 2 g/dL      RDW 14 6 %      MPV 8 8 (L) fL      Platelets 642 Thousands/uL      nRBC 0 /100 WBCs      Neutrophils Relative 87 (H) %      Immat GRANS % 1 %      Lymphocytes Relative 5 (L) %      Monocytes Relative 7 %      Eosinophils Relative 0 %      Basophils Relative 0 %      Neutrophils Absolute 16 97 (H) Thousands/µL      Immature Grans Absolute 0 12 Thousand/uL      Lymphocytes Absolute 0 98 Thousands/µL      Monocytes Absolute 1 39 (H) Thousand/µL      Eosinophils Absolute 0 00 Thousand/µL      Basophils Absolute 0 03 Thousands/µL     UA w Reflex to Microscopic [[id number]]     Lab Status:  No result Specimen:  Urine                  XR chest 2 views   Final Result by Mabel Tejada MD (12/19 2064)      No radiographic evidence of acute intrathoracic process accounting for shallow inspiratory result  Workstation performed: [id number]          transplant kidney with doppler    (Results Pending)              Procedures  Procedures       Phone Contacts  ED Phone Contact    ED Course                               MDM  Number of Diagnoses or Management Options  Acute on chronic kidney failure [facility]):   Dehydration:   NSTEMI (non-ST elevated myocardial infarction) [facility]):   Sepsis [facility]):   Diagnosis management comments:   Patient is a 69-year-old male coming in today with complaints of generalized weakness  Given his multiple comorbidities will check labs as well as influenza and RS V  Will give IV fluids as well as check urine and chest x-ray  On exam he is nontoxic appearing  Is noted last echo in May of 2018 showed the EF greater than 60%    EKG INTERPRETATION at 8:20 a m  RHYTHM:  Normal sinus rhythm at 90 beats per minute  AXIS:  Normal axis  INTERVALS:  HI interval measured at 132 millisecond  QRS COMPLEX:  QRS measured at 102 milliseconds  ST SEGMENT:  Nonspecific ST segment changes  Artifact present  Mild ST depressions in V5 and V6  QT INTERVAL:  QTC measured at 491 milliseconds  COMPARED WITH PRIOR compared to old EKG in May of this year no acute change except for mildly prolonged QTC compared to old  Interpretation by See Nelson,     9:00 AM  Noted elevated leukocytosis and will obtain blood cultures  Flu and RSV obtained  Pending chest x-ray read  Noted significant change in patient's creatinine from 3-6  Will obtain ultrasound of transplant kidney as well  Will give 2 L and recheck CM P  Patient's glucose is 280 with an noted anion gap at 23      9:13 AM  Patient with multi-system failure  He does have leukocytosis without bands, acute on chronic kidney injury, hyponatremia, anion gap acidosis resume from diabetes  He does appear dry  Neurologically patient is intact  No altered mental status  No focal deficits    Cardiovascular, patient does have NSTEMI    He has no chest pain or shortness of breath  Aspirin ordered  He is hypotensive   2 L IV fluids ordered  GI, no acute change  Renal   Acute on chronic renal failure with history of renal transplant 2001  Ultrasound at bedside now for interrogation of patient's renal transplant  2 L IV fluids given as well  Patient does have mildly elevated glucose with anion gap  ICU attending wishes for acetone and beta hydroxybutyrate before starting insulin drip       9:20 AM  Dr Caio Cuellar from [facility] accepts patient will be going to the ICU  We both agree vancomycin and cefepime for patient  Beta hydroxybutyrate before starting insulin  Updated on this and agreeable  Rediscussed with patient is he needs to keep his arm straight to continue IV fluids  9:59 AM  Pending 2nd line as IV fluids are stone are infusing properly  Ultrasound at bedside and states that there is good blood flow to transplant; however high resistance concerning for failure  Patient does state that he follow with Nephrology last week a m     I did remove patient's remote MAFO to examine foot  He does have a maculopapular lesion on the plantar surface of the right foot midfoot  He states it has been there since they put the matthias in his foot  Is erythematous  There is no vesicles or petechiae  10:10 AM  Transfer here  Acetone not resulted  Wife wished for him to go to [facility]  However, patient refused  He is alert oriented x3 and can make his decision  IV fluids as well as antibiotics infusing  Vanco infusing    10:24 AM  Wife and I discussed at length regarding options  I did offer to call [place]  However they are aware that this could take hours as I would not have a accepting physician as well as transport  I discussed with him given his critical nature from ICU care  Second L is being hung  Transported here  Vancomycin infusing  Cefepime had not confused  Labs as well as insulin drip has not started prior to transport      Dr Caio Cuellar aware that we do not run beta hydroxybutyrate at this campus  Acetone was still pending  Was aware of IV fluids as well as antibiotics that was infused and not refused  Portions of the record may have been created with voice recognition software  Occasional wrong word or "sound a like" substitutions may have occurred due to the inherent limitations of voice recognition software  Read the chart carefully and recognize, using context, where substitutions have occurred  Amount and/or Complexity of Data Reviewed  Clinical lab tests: ordered and reviewed  Tests in the radiology section of CPT®: ordered and reviewed  Tests in the medicine section of CPT®: ordered and reviewed      CritCare Time    Disposition  Final diagnoses:   Sepsis ([facility] )   NSTEMI (non-ST elevated myocardial infarction) ([place] [address] )   Acute on chronic kidney failure ([place] [address] )   Dehydration   High anion gap metabolic acidosis     Time reflects when diagnosis was documented in both MDM as applicable and the Disposition within this note     Time User Action Codes Description Comment    12/19/2018  9:25 AM Bendock, Lorena L Add [J02 0] Septic angina     12/19/2018  9:25 AM Bendock, Lorena L Remove [J02 0] Septic angina     12/19/2018  9:25 AM Bendock, Lorena L Add [A41 9] [facility]epsis [facility]     12/19/2018  9:25 AM Bendock Richad Hammers Add [I21 4] NSTEMI (non-ST elevated myocardial infarction) ([place] [address] )     12/19/2018  9:26 AM Bendock, Richad Hammers Add [N17 9,  N18 9] Acute on chronic kidney failure ([place] [address] )     12/19/2018  9:26 AM BendAlice rbaswell L Add [E86 0] Dehydration     12/19/2018 10:45 AM Bendock, Richad Hammers Add [E87 2] High anion gap metabolic acidosis       ED Disposition     ED Disposition Condition Comment    Transfer to Another Facility  Case was discussed with Dr Jonathan Johnston and the patient's admission status was agreed to be Admission Status: inpatient status to the service of Dr Jonathan Johnston MD Documentation      Most Recent Value   Patient Condition  The patient has been stabilized such that within reasonable medical probability, no material deterioration of the patient condition or the condition of the unborn child(tiesha) is likely to result from the transfer   Reason for Transfer  Level of Care needed not available at this facility, Patient/Family request, No bed available at level of patient's needs   Benefits of Transfer  Specialized equipment and/or services available at the receiving facility (Include comment)________________________   Risks of Transfer  Potential for delay in receiving treatment, Potential deterioration of medical condition, Loss of IV, Increased discomfort during transfer   Accepting Physician  dr Robbin Gamez Name, Janny contreras   Sending MD dr joy   Provider Certification  General risk, such as traffic hazards, adverse weather conditions, rough terrain or turbulence, possible failure of equipment (including vehicle or aircraft), or consequences of actions of persons outside the control of the transport personnel      RN Documentation      [facility] Name, [address]   Bed Assignment  ICU bed 6      Follow-up Information    None         Patient's Medications   Discharge Prescriptions    No medications on file     No discharge procedures on file      ED Provider  Electronically Signed by           Dariel Sethi DO  12/19/18 8454

## 2018-12-19 NOTE — ED NOTES
lals here   Report given to tim emtp care transferred to same     Makenna Fabian, GERMAN  12/19/18 1038

## 2018-12-19 NOTE — H&P
H&P- Carol Corona 1970, 51 y o  male MRN: [id number]    Unit/Bed#: ICU 06 Encounter: [id number]    Primary Care Provider: Julius Clark DO   Date and time admitted to hospital: 12/19/2018 11:10 AM        * Severe sepsis ([facility] [address] )   Assessment & Plan    · Severe sepsis likely due to infection of right ankle given history of recurrent infections and osteomyelitis  · Patient had recent surgery on the ankle and was treated with prolonged course of doxycycline for corynebacterium striatum  · Recently, patient had a trauma to the ankle and had been complaining of pain, with protruding screws  · Case discussed with critical care  · They are recommending transfer to tertiary care facility given his complicated history of renal transplant, recurrent infections and osteomyelitis  · Case discussed with NOLA Johnson, transfers pending  · In the interim, we will treat with vancomycin/cefepime/doxycycline pending culture results  · Continue IV fluid resuscitation     Osteomyelitis ([facility] )   Assessment & Plan    · We will treat with antibiotics as noted above     Non-ST elevation myocardial infarction (NSTEMI), type 2   Assessment & Plan    · NSTEMI type 2 due to supply demand mismatch from above-mentioned medical problems     MIKE [facility]acute kidney [facility]) [facility]   Assessment & Plan    · Concerning for worsening renal function in a transplanted patient  · Plan to transfer to tertiary care center for definitive treatment evaluation  · Ultrasound appeared relatively unremarkable with no obstruction     Renal transplant, status post   Assessment & Plan    · In light of renal failure and sepsis, will hold tacrolimus and Imuran, continue prednisone     Type 1 diabetes mellitus ([facility] [address] )   Assessment & Plan    Lab Results   Component Value Date    HGBA1C 7 0 (H) 09/24/2018       Recent Labs      12/19/18   1156   POCGLU  237*       Blood Sugar Average: Last 72 hrs:  (P) 237     Check beta hydroxybutyrate  Will continue with his home insulin regimen for now       VTE Prophylaxis: Heparin  Code Status: Full  POLST: POLST is not applicable to this patient  Discussion with family:wife and brother    Anticipated Length of Stay:  Patient will be admitted on an Inpatient basis with an anticipated length of stay of  > 2 midnights  Justification for Hospital Stay: severe sepsis    Total Time for Visit, including Counseling / Coordination of Care: 120 minutes  Greater than 50% of this total time spent on direct patient counseling and coordination of care  Chief Complaint:   Weakness and right ankle pain    History of Present Illness:    Miller Hahn is a 51 y o  male who presents with weakness and right ankle pain  Patient has a complicated past medical history including renal transplant, on immunosuppressants, history of recurrent osteomyelitis and infections of his right ankle status post recent surgery and treatment with doxycycline for corynebacterium stratium who presents with right ankle pain, weakness and nausea  He presented to the emergency department was found to be in renal failure and was subsequently transferred to our intensive care unit  Upon my examination, patient notes that he feels somewhat better, although, still complaining of significant nausea  Patient is otherwise without complaints  Hospital Course:    I discussed the patient's case with critical care attending and in light of the patient's multiple comorbid medical conditions including renal transplant status, worsening renal function, severe sepsis due to joint infection, it was felt that patient would benefit from a higher level of care at a tertiary care facility the patient be seen by infectious disease, transplant Nephrology, and Orthopedic surgery specializing in Charcot joint  Case was discussed with NOLA Johnson who has accepted the patient in transfer  Will continue with above-mentioned treatment in the interim      Review of Systems:  Review of Systems Constitutional: Positive for fatigue and fever  Gastrointestinal: Positive for nausea and vomiting  Musculoskeletal: Positive for gait problem and joint swelling  All other systems reviewed and are negative  Past Medical and Surgical History:   Past Medical History:   Diagnosis Date    Cardiac arrest ([facility] )     Diabetes mellitus ([facility] )     [facility]     Hypertension     Infection at site of external fixator pin ([facility]     MI (myocardial infarction) ([place] [address] )     Pneumonia     Last Assessed [id number]    Renal failure     Renal transplant, status post 07/21/2007       Past Surgical History:   Procedure Laterality Date    ANKLE FRACTURE SURGERY      ANKLE HARDWARE REMOVAL Right 7/31/2017    Procedure: REMOVAL HARDWARE ANKLE;  Surgeon: Emmie Medeiros MD;  Location: [facility];  Service: Orthopedics    ANKLE HARDWARE REMOVAL Right 8/17/2017    Procedure: TIBIA FAILED HARDWARE REMOVAL;  Surgeon: Emmie Medeiros MD;  [facility]: [facility];  Service: Orthopedics    CARDIAC SURGERY      2 stents    CLOSED REDUCTION ANKLE Right 7/3/2017    Procedure: CLOSED REDUCTION DISTAL TIB-FIB AND CASTING VS;  Surgeon: Emmie Medeiros MD;  [facility]: [facility];  Service: Orthopedics    EYE SURGERY      FRACTURE SURGERY      ORIF Rt Ankle    GLUTAMIC ACID DECARBOXYLASE (HISTORICAL)      IR PICC LINE  8/20/2018    IR VENOUS LINE REMOVAL  10/5/2018    NEPHRECTOMY TRANSPLANTED ORGAN      [place] OPEN TREATMENT FRACTURE DISTAL TIBIA FIBULA Right 7/3/2017    Procedure: OPEN REDUCTION W/ INTERNAL FIXATION (ORIF); Surgeon: Emmie Medeiros MD;  [facility]: [facility];  Service: Orthopedics    TOE AMPUTATION Right 10/27/2016    Procedure: AMPUTATION TOE;  Surgeon: Yaya Bonilla DPM;  Location: [facility];  Service:        Meds/Allergies:  Prior to Admission medications    Medication Sig Start Date End Date Taking?  Authorizing Provider   NIFEdipine ER (ADALAT CC) 30 MG 24 hr tablet Take 30 mg by mouth daily Yes Historical Provider, MD   aspirin (ECOTRIN LOW STRENGTH) 81 mg EC tablet Take 81 mg by mouth daily    Historical Provider, MD   atorvastatin (LIPITOR) 40 mg tablet Take 40 mg by mouth daily    Historical Provider, MD   azaTHIOprine (IMURAN) 50 mg tablet Take 50 mg by mouth daily    Historical Provider, MD   calcitriol (ROCALTROL) 0 5 MCG capsule Take 0 5 mcg by mouth daily    Historical Provider, MD   insulin detemir (LEVEMIR) 100 units/mL subcutaneous injection Inject 10 Units under the skin 2 (two) times a day    Historical Provider, MD   insulin lispro (HUMALOG) 100 units/mL injection Inject 30 Units under the skin daily 5/30/18   Kassidy Sims DO   Lactobacillus (ACIDOPHILUS PO) Take 300 mg by mouth    Historical Provider, MD   metoprolol succinate (TOPROL-XL) 25 mg 24 hr tablet Take 25 mg by mouth daily    Historical Provider, MD   multivitamin (THERAGRAN) TABS Take 1 tablet by mouth daily    Historical Provider, MD   predniSONE 5 mg tablet Take 5 mg by mouth daily    Historical Provider, MD   sodium bicarbonate 650 mg tablet Take 1 tablet by mouth 2 (two) times a day 10/26/17   Breezy Huang MD   tacrolimus (PROGRAF) 0 5 mg capsule Take 0 5 mg by mouth daily In the AM and 1mg hs     Historical Provider, MD   NIFEdipine (PROCARDIA PO) Take 1 capsule by mouth daily  12/19/18  Historical Provider, MD     I have reviewed home medications with patient family member      Allergies: No Known Allergies    Social History:  Marital Status: /Civil Union   Occupation: n/a  Patient Pre-hospital Living Situation: home  Patient Pre-hospital Level of Mobility: limited  Patient Pre-hospital Diet Restrictions: diabetic  Substance Use History:     History   Alcohol Use No     History   Smoking Status    Never Smoker   Smokeless Tobacco    Never Used     History   Drug Use No       Family History:  I have reviewed the patients family history    Physical Exam:   Vitals:   Blood Pressure: 117/53 (12/19/18 1123)  Pulse: 97 (12/19/18 1123)  Temperature: (!) 97 4 °F (36 3 °C) (12/19/18 1123)  Temp Source: Tympanic (12/19/18 1123)  Respirations: (!) 25 (12/19/18 1123)  Height: 5' 4" (162 6 cm) (12/19/18 1135)  Weight - Scale: 53 1 kg (117 lb 1 6 oz) (12/19/18 1135)  SpO2: 97 % (12/19/18 1123)    Physical Exam   Constitutional: He is oriented to person, place, and time  He appears well-developed and well-nourished  HENT:   Head: Normocephalic and atraumatic  Eyes: Pupils are equal, round, and reactive to light  EOM are normal    Neck: Normal range of motion  Neck supple  No JVD present  Cardiovascular: Normal rate, regular rhythm and normal heart sounds  Pulmonary/Chest: Effort normal and breath sounds normal  No respiratory distress  He has no wheezes  Abdominal: Soft  He exhibits no distension  There is tenderness  There is no rebound  Musculoskeletal: He exhibits edema and tenderness  Neurological: He is alert and oriented to person, place, and time  Skin: Skin is warm  There is erythema  Psychiatric: He has a normal mood and affect  His behavior is normal    Nursing note and vitals reviewed  Additional Data:   Lab Results: I have personally reviewed pertinent reports  Results from last 7 days  Lab Units 12/19/18  0828   WBC Thousand/uL 19 49*   HEMOGLOBIN g/dL 8 7*   HEMATOCRIT % 26 2*   PLATELETS Thousands/uL 347   NEUTROS PCT % 87*   LYMPHS PCT % 5*   MONOS PCT % 7   EOS PCT % 0       Results from last 7 days  Lab Units 12/19/18  0828   POTASSIUM mmol/L 3 4*   CHLORIDE mmol/L 89*   CO2 mmol/L 13*   BUN mg/dL 129*   CREATININE mg/dL 6 69*   CALCIUM mg/dL 7 6*   ALK PHOS U/L 173*   ALT U/L 18   AST U/L 14       Results from last 7 days  Lab Units 12/19/18  0828   INR  1 15       Results from last 7 days  Lab Units 12/19/18  1156   POC GLUCOSE mg/dl 237*           Imaging: I have personally reviewed pertinent reports      No orders to display       EKG, Pathology, and Other Studies Reviewed on Admission:   · EKG: n/a    NetAccess/Epic Records Reviewed: Yes     ** Please Note: This note has been constructed using a voice recognition system   **

## 2018-12-19 NOTE — EMTALA/ACUTE CARE TRANSFER
[address]  [address]  [address] [facility] [address] [id number]  [phone]  Dept: [phone]      ACUTE CARE TRANSFER CONSENT    NAME Hillary SPENCE 1970                              MRN [id number]    I have been informed of my rights regarding examination, treatment, and transfer   by Dr Corinne Hester MD    Benefits: Specialized equipment and/or services available at the receiving facility (Include comment)________________________ (transplant nephrology, orthopedic surgery specializing on Charcot joint)    Risks: Potential for delay in receiving treatment, Loss of IV, Increased discomfort during transfer, Potential deterioration of medical condition, Possible worsening of condition or death during transfer      Transfer Request:  I acknowledge that my medical condition has been evaluated and explained to me by the treating physician or other qualified medical person and/or my attending physician who has recommended and offered to me further medical examination and treatment  I understand the Hospital's obligation with respect to the treatment and stabilization of my medical condition  I nevertheless request to be transferred  I release the Hospital, the doctor, and any other persons caring for me from all responsibility or liability for any injury or ill effects that may result from my transfer and agree to accept all responsibility for the consequences of my choice to transfer, rather than receive stabilizing treatment at the Hospital  I understand that because the transfer is my request, my insurance may not provide reimbursement for the services  The Hospital will assist and direct me and my family in how to make arrangements for transfer, but the hospital is not liable for any fees charged by the transport service    In spite of this understanding, I refuse to consent to further medical examination and treatment which has been offered to me, and request transfer to    I authorize the performance of emergency medical procedures and treatments upon me in both transit and upon arrival at the receiving facility  Additionally, I authorize the release of any and all medical records to the receiving facility and request they be transported with me, if possible  I authorize the performance of emergency medical procedures and treatments upon me in both transit and upon arrival at the receiving facility  Additionally, I authorize the release of any and all medical records to the receiving facility and request they be transported with me, if possible  I understand that the safest mode of transportation during a medical emergency is an ambulance and that the Hospital advocates the use of this mode of transport  Risks of traveling to the receiving facility by car, including absence of medical control, life sustaining equipment, such as oxygen, and medical personnel has been explained to me and I fully understand them  (LARISSA CORRECT BOX BELOW)  [  ]  I consent to the stated transfer and to be transported by ambulance/helicopter  [  ]  I consent to the stated transfer, but refuse transportation by ambulance and accept full responsibility for my transportation by car  I understand the risks of non-ambulance transfers and I exonerate the Hospital and its staff from any deterioration in my condition that results from this refusal     X___________________________________________    DATE  18  TIME________  Signature of patient or legally responsible individual signing on patient behalf           RELATIONSHIP TO PATIENT_________________________          Provider Certification    NAME Vishal Howe                                        JORDY 1970                              MRN [id number]    A medical screening exam was performed on the above named patient    Based on the examination:    Condition Necessitating Transfer severe sepsis, renal failure and osteomyelitis of foot    Patient Condition: The patient has been stabilized such that within reasonable medical probability, no material deterioration of the patient condition or the condition of the unborn child(tiesha) is likely to result from the transfer    Reason for Transfer: Level of Care needed not available at this facility    Transfer Requirements: Facility     · Space available and qualified personnel available for treatment as acknowledged by    · Agreed to accept transfer and to provide appropriate medical treatment as acknowledged by Jose Corley          · Appropriate medical records of the examination and treatment of the patient are provided at the time of transfer   [address] [facility], [address] _______  · Transfer will be performed by qualified personnel from    and appropriate transfer equipment as required, including the use of necessary and appropriate life support measures  Provider Certification: I have examined the patient and explained the following risks and benefits of being transferred/refusing transfer to the patient/family:  General risk, such as traffic hazards, adverse weather conditions, rough terrain or turbulence, possible failure of equipment (including vehicle or aircraft), or consequences of actions of persons outside the control of the transport personnel      Based on these reasonable risks and benefits to the patient and/or the unborn child(tiesha), and based upon the information available at the time of the patients examination, I certify that the medical benefits reasonably to be expected from the provision of appropriate medical treatments at another medical facility outweigh the increasing risks, if any, to the individuals medical condition, and in the case of labor to the unborn child, from effecting the transfer      X____________________________________________ DATE 12/19/18        TIME_______      ORIGINAL - SEND TO MEDICAL RECORDS COPY - SEND WITH PATIENT DURING TRANSFER

## 2018-12-19 NOTE — Clinical Note
Case was discussed with Dr Jahaira Lugo and the patient's admission status was agreed to be Admission Status: inpatient status to the service of Dr Jahaira Lugo

## 2018-12-19 NOTE — EMTALA/ACUTE CARE TRANSFER
[address]  [address] [facility] [address]  Dept: [phone]      TPCR TRANSFER CONSENT    NAME oRb Rodriguez                                         1970                              MRN [id number]    I have been informed of my rights regarding examination, treatment, and transfer   by Dr Iglesia Wellington DO    Benefits: Specialized equipment and/or services available at the receiving facility (Include comment)________________________    Risks: Potential for delay in receiving treatment, Potential deterioration of medical condition, Loss of IV, Increased discomfort during transfer      Consent for Transfer:  I acknowledge that my medical condition has been evaluated and explained to me by the emergency department physician or other qualified medical person and/or my attending physician, who has recommended that I be transferred to the service of    at    The above potential benefits of such transfer, the potential risks associated with such transfer, and the probable risks of not being transferred have been explained to me, and I fully understand them  The doctor has explained that, in my case, the benefits of transfer outweigh the risks  I agree to be transferred  I authorize the performance of emergency medical procedures and treatments upon me in both transit and upon arrival at the receiving facility  Additionally, I authorize the release of any and all medical records to the receiving facility and request they be transported with me, if possible  I understand that the safest mode of transportation during a medical emergency is an ambulance and that the Hospital advocates the use of this mode of transport  Risks of traveling to the receiving facility by car, including absence of medical control, life sustaining equipment, such as oxygen, and medical personnel has been explained to me and I fully understand them      ([address])  [  ] I consent to the stated transfer and to be transported by ambulance/helicopter  [  ]  I consent to the stated transfer, but refuse transportation by ambulance and accept full responsibility for my transportation by car  I understand the risks of non-ambulance transfers and I exonerate the Hospital and its staff from any deterioration in my condition that results from this refusal     X___________________________________________    DATE  18  TIME________  Signature of patient or legally responsible individual signing on patient behalf           RELATIONSHIP TO PATIENT_________________________          Provider Certification    NAME Delmar Ross                                         1970                              MRN [id number]    A medical screening exam was performed on the above named patient  Based on the examination:    Condition Necessitating Transfer The primary encounter diagnosis was Sepsis ([facility] [address] )  Diagnoses of NSTEMI (non-ST elevated myocardial infarction) ([facility] ), Acute on chronic kidney failure ([facility] ), and Dehydration were also pertinent to this visit      Patient Condition: The patient has been stabilized such that within reasonable medical probability, no material deterioration of the patient condition or the condition of the unborn child(tiesha) is likely to result from the transfer    Reason for Transfer: Level of Care needed not available at this facility, Patient/Family request, No bed available at level of patient's needs    Transfer Requirements: Facility     · Space available and qualified personnel available for treatment as acknowledged by    · Agreed to accept transfer and to provide appropriate medical treatment as acknowledged by          · Appropriate medical records of the examination and treatment of the patient are provided at the time of transfer   [address] _______  · Transfer will be performed by qualified personnel from    and appropriate transfer equipment as required, including the use of necessary and appropriate life support measures  Provider Certification: I have examined the patient and explained the following risks and benefits of being transferred/refusing transfer to the patient/family:  General risk, such as traffic hazards, adverse weather conditions, rough terrain or turbulence, possible failure of equipment (including vehicle or aircraft), or consequences of actions of persons outside the control of the transport personnel      Based on these reasonable risks and benefits to the patient and/or the unborn child(tiesha), and based upon the information available at the time of the patients examination, I certify that the medical benefits reasonably to be expected from the provision of appropriate medical treatments at another medical facility outweigh the increasing risks, if any, to the individuals medical condition, and in the case of labor to the unborn child, from effecting the transfer      X____________________________________________ DATE 12/19/18        TIME_______      ORIGINAL - SEND TO MEDICAL RECORDS   COPY - SEND WITH PATIENT DURING TRANSFER

## 2018-12-20 ENCOUNTER — ANESTHESIA (INPATIENT)
Dept: GASTROENTEROLOGY | Facility: HOSPITAL | Age: 48
DRG: 559 | End: 2018-12-20
Payer: COMMERCIAL

## 2018-12-20 ENCOUNTER — APPOINTMENT (INPATIENT)
Dept: RADIOLOGY | Facility: HOSPITAL | Age: 48
DRG: 559 | End: 2018-12-20
Payer: COMMERCIAL

## 2018-12-20 ENCOUNTER — ANESTHESIA EVENT (INPATIENT)
Dept: GASTROENTEROLOGY | Facility: HOSPITAL | Age: 48
DRG: 559 | End: 2018-12-20
Payer: COMMERCIAL

## 2018-12-20 ENCOUNTER — HOSPITAL ENCOUNTER (INPATIENT)
Facility: HOSPITAL | Age: 48
LOS: 3 days | Discharge: ANOTHER HOSPITAL | DRG: 559 | End: 2018-12-23
Attending: INTERNAL MEDICINE | Admitting: INTERNAL MEDICINE
Payer: COMMERCIAL

## 2018-12-20 ENCOUNTER — APPOINTMENT (INPATIENT)
Dept: NON INVASIVE DIAGNOSTICS | Facility: HOSPITAL | Age: 48
DRG: 559 | End: 2018-12-20
Payer: COMMERCIAL

## 2018-12-20 VITALS
OXYGEN SATURATION: 100 % | HEART RATE: 121 BPM | BODY MASS INDEX: 19.99 KG/M2 | WEIGHT: 117.1 LBS | TEMPERATURE: 98.7 F | SYSTOLIC BLOOD PRESSURE: 118 MMHG | DIASTOLIC BLOOD PRESSURE: 69 MMHG | RESPIRATION RATE: 26 BRPM | HEIGHT: 64 IN

## 2018-12-20 DIAGNOSIS — Z94.0 RENAL TRANSPLANT, STATUS POST: ICD-10-CM

## 2018-12-20 DIAGNOSIS — N17.9 ACUTE RENAL FAILURE, UNSPECIFIED ACUTE RENAL FAILURE TYPE (HCC): ICD-10-CM

## 2018-12-20 DIAGNOSIS — M86.8X6: Primary | ICD-10-CM

## 2018-12-20 DIAGNOSIS — A41.9 SEVERE SEPSIS (HCC): ICD-10-CM

## 2018-12-20 DIAGNOSIS — N17.9 AKI (ACUTE KIDNEY INJURY) (HCC): ICD-10-CM

## 2018-12-20 DIAGNOSIS — M79.671 RIGHT FOOT PAIN: ICD-10-CM

## 2018-12-20 DIAGNOSIS — K92.2 GASTROINTESTINAL HEMORRHAGE, UNSPECIFIED GASTROINTESTINAL HEMORRHAGE TYPE: ICD-10-CM

## 2018-12-20 DIAGNOSIS — R65.20 SEVERE SEPSIS (HCC): ICD-10-CM

## 2018-12-20 PROBLEM — E87.29 HIGH ANION GAP METABOLIC ACIDOSIS: Status: ACTIVE | Noted: 2018-12-20

## 2018-12-20 PROBLEM — E87.1 HYPONATREMIA: Status: ACTIVE | Noted: 2018-12-20

## 2018-12-20 PROBLEM — E83.39 HYPERPHOSPHATEMIA: Status: ACTIVE | Noted: 2018-12-20

## 2018-12-20 PROBLEM — E87.2 HIGH ANION GAP METABOLIC ACIDOSIS: Status: ACTIVE | Noted: 2018-12-20

## 2018-12-20 PROBLEM — N18.4 STAGE 4 CHRONIC KIDNEY DISEASE (HCC): Status: ACTIVE | Noted: 2018-12-20

## 2018-12-20 LAB
ABO GROUP BLD: NORMAL
ALBUMIN SERPL BCP-MCNC: 2 G/DL (ref 3.5–5)
ALP SERPL-CCNC: 157 U/L (ref 46–116)
ALT SERPL W P-5'-P-CCNC: 14 U/L (ref 12–78)
AMORPH URATE CRY URNS QL MICRO: ABNORMAL /HPF
ANION GAP SERPL CALCULATED.3IONS-SCNC: 17 MMOL/L (ref 4–13)
ANION GAP SERPL CALCULATED.3IONS-SCNC: 17 MMOL/L (ref 4–13)
ANION GAP SERPL CALCULATED.3IONS-SCNC: 18 MMOL/L (ref 4–13)
ANION GAP SERPL CALCULATED.3IONS-SCNC: 19 MMOL/L (ref 4–13)
ANION GAP SERPL CALCULATED.3IONS-SCNC: 21 MMOL/L (ref 4–13)
ANION GAP SERPL CALCULATED.3IONS-SCNC: 21 MMOL/L (ref 4–13)
AST SERPL W P-5'-P-CCNC: 15 U/L (ref 5–45)
ATRIAL RATE: 89 BPM
BACTERIA UR QL AUTO: ABNORMAL /HPF
BASE EXCESS BLDA CALC-SCNC: -12.9 MMOL/L
BASOPHILS # BLD AUTO: 0.02 THOUSANDS/ΜL (ref 0–0.1)
BASOPHILS NFR BLD AUTO: 0 % (ref 0–1)
BILIRUB SERPL-MCNC: 0.27 MG/DL (ref 0.2–1)
BILIRUB UR QL STRIP: NEGATIVE
BLD GP AB SCN SERPL QL: NEGATIVE
BUN SERPL-MCNC: 143 MG/DL (ref 5–25)
BUN SERPL-MCNC: 143 MG/DL (ref 5–25)
BUN SERPL-MCNC: 144 MG/DL (ref 7–25)
BUN SERPL-MCNC: 157 MG/DL (ref 5–25)
BUN SERPL-MCNC: 162 MG/DL (ref 5–25)
BUN SERPL-MCNC: 162 MG/DL (ref 5–25)
CALCIUM SERPL-MCNC: 7.8 MG/DL (ref 8.3–10.1)
CALCIUM SERPL-MCNC: 7.8 MG/DL (ref 8.6–10.5)
CALCIUM SERPL-MCNC: 7.9 MG/DL (ref 8.3–10.1)
CALCIUM SERPL-MCNC: 7.9 MG/DL (ref 8.3–10.1)
CALCIUM SERPL-MCNC: 8 MG/DL (ref 8.3–10.1)
CALCIUM SERPL-MCNC: 8.1 MG/DL (ref 8.3–10.1)
CHLORIDE SERPL-SCNC: 96 MMOL/L (ref 100–108)
CHLORIDE SERPL-SCNC: 96 MMOL/L (ref 100–108)
CHLORIDE SERPL-SCNC: 98 MMOL/L (ref 100–108)
CHLORIDE SERPL-SCNC: 98 MMOL/L (ref 98–107)
CHLORIDE SERPL-SCNC: 99 MMOL/L (ref 100–108)
CHLORIDE SERPL-SCNC: 99 MMOL/L (ref 100–108)
CLARITY UR: CLEAR
CO2 SERPL-SCNC: 11 MMOL/L (ref 21–31)
CO2 SERPL-SCNC: 12 MMOL/L (ref 21–32)
CO2 SERPL-SCNC: 12 MMOL/L (ref 21–32)
CO2 SERPL-SCNC: 14 MMOL/L (ref 21–32)
CO2 SERPL-SCNC: 17 MMOL/L (ref 21–32)
CO2 SERPL-SCNC: 17 MMOL/L (ref 21–32)
COLOR UR: YELLOW
CREAT SERPL-MCNC: 6.19 MG/DL (ref 0.6–1.3)
CREAT SERPL-MCNC: 6.26 MG/DL (ref 0.7–1.3)
CREAT SERPL-MCNC: 6.37 MG/DL (ref 0.6–1.3)
CREAT SERPL-MCNC: 6.59 MG/DL (ref 0.6–1.3)
CREAT SERPL-MCNC: 6.71 MG/DL (ref 0.6–1.3)
CREAT SERPL-MCNC: 6.71 MG/DL (ref 0.6–1.3)
EOSINOPHIL # BLD AUTO: 0.01 THOUSAND/ΜL (ref 0–0.61)
EOSINOPHIL NFR BLD AUTO: 0 % (ref 0–6)
ERYTHROCYTE [DISTWIDTH] IN BLOOD BY AUTOMATED COUNT: 14.7 % (ref 11.6–15.1)
FLUAV AG SPEC QL: NORMAL
FLUBV AG SPEC QL: NORMAL
GFR SERPL CREATININE-BSD FRML MDRD: 10 ML/MIN/1.73SQ M
GFR SERPL CREATININE-BSD FRML MDRD: 10 ML/MIN/1.73SQ M
GFR SERPL CREATININE-BSD FRML MDRD: 9 ML/MIN/1.73SQ M
GLUCOSE SERPL-MCNC: 102 MG/DL (ref 65–140)
GLUCOSE SERPL-MCNC: 103 MG/DL (ref 65–140)
GLUCOSE SERPL-MCNC: 104 MG/DL (ref 65–140)
GLUCOSE SERPL-MCNC: 106 MG/DL (ref 65–140)
GLUCOSE SERPL-MCNC: 118 MG/DL (ref 65–140)
GLUCOSE SERPL-MCNC: 120 MG/DL (ref 65–140)
GLUCOSE SERPL-MCNC: 120 MG/DL (ref 65–140)
GLUCOSE SERPL-MCNC: 129 MG/DL (ref 65–140)
GLUCOSE SERPL-MCNC: 147 MG/DL (ref 65–140)
GLUCOSE SERPL-MCNC: 153 MG/DL (ref 65–140)
GLUCOSE SERPL-MCNC: 154 MG/DL (ref 65–140)
GLUCOSE SERPL-MCNC: 156 MG/DL (ref 65–140)
GLUCOSE SERPL-MCNC: 157 MG/DL (ref 65–140)
GLUCOSE SERPL-MCNC: 164 MG/DL (ref 65–140)
GLUCOSE SERPL-MCNC: 242 MG/DL (ref 65–140)
GLUCOSE SERPL-MCNC: 312 MG/DL (ref 65–140)
GLUCOSE SERPL-MCNC: 331 MG/DL (ref 65–140)
GLUCOSE SERPL-MCNC: 59 MG/DL (ref 65–140)
GLUCOSE SERPL-MCNC: 86 MG/DL (ref 65–140)
GLUCOSE SERPL-MCNC: 87 MG/DL (ref 65–140)
GLUCOSE SERPL-MCNC: 87 MG/DL (ref 65–140)
GLUCOSE SERPL-MCNC: 88 MG/DL (ref 65–140)
GLUCOSE SERPL-MCNC: 91 MG/DL (ref 65–140)
GLUCOSE SERPL-MCNC: 91 MG/DL (ref 65–99)
GLUCOSE SERPL-MCNC: 93 MG/DL (ref 65–140)
GLUCOSE UR STRIP-MCNC: NEGATIVE MG/DL
HCO3 BLDA-SCNC: 12.6 MMOL/L (ref 22–28)
HCT VFR BLD AUTO: 19.4 % (ref 36.5–49.3)
HEMOCCULT STL QL: POSITIVE
HGB BLD-MCNC: 6.1 G/DL (ref 12–17)
HGB BLD-MCNC: 6.1 G/DL (ref 12–17)
HGB BLD-MCNC: 8 G/DL (ref 12–17)
HGB BLD-MCNC: 8.8 G/DL (ref 12–17)
HGB UR QL STRIP.AUTO: ABNORMAL
IMM GRANULOCYTES # BLD AUTO: 0.21 THOUSAND/UL (ref 0–0.2)
IMM GRANULOCYTES NFR BLD AUTO: 2 % (ref 0–2)
KETONES UR STRIP-MCNC: NEGATIVE MG/DL
LEUKOCYTE ESTERASE UR QL STRIP: NEGATIVE
LYMPHOCYTES # BLD AUTO: 1.21 THOUSANDS/ΜL (ref 0.6–4.47)
LYMPHOCYTES NFR BLD AUTO: 9 % (ref 14–44)
MAGNESIUM SERPL-MCNC: 1.9 MG/DL (ref 1.6–2.6)
MAGNESIUM SERPL-MCNC: 2 MG/DL (ref 1.6–2.6)
MAGNESIUM SERPL-MCNC: 2 MG/DL (ref 1.6–2.6)
MAGNESIUM SERPL-MCNC: 2.2 MG/DL (ref 1.6–2.6)
MCH RBC QN AUTO: 29.5 PG (ref 26.8–34.3)
MCHC RBC AUTO-ENTMCNC: 31.4 G/DL (ref 31.4–37.4)
MCV RBC AUTO: 94 FL (ref 82–98)
MONOCYTES # BLD AUTO: 1.61 THOUSAND/ΜL (ref 0.17–1.22)
MONOCYTES NFR BLD AUTO: 12 % (ref 4–12)
NASAL CANNULA: 2
NEUTROPHILS # BLD AUTO: 10.47 THOUSANDS/ΜL (ref 1.85–7.62)
NEUTS SEG NFR BLD AUTO: 77 % (ref 43–75)
NITRITE UR QL STRIP: NEGATIVE
NON-SQ EPI CELLS URNS QL MICRO: ABNORMAL /HPF
NRBC BLD AUTO-RTO: 0 /100 WBCS
O2 CT BLDA-SCNC: 4.4 ML/DL (ref 16–23)
OXYHGB MFR BLDA: 69.6 % (ref 94–97)
P AXIS: 60 DEGREES
PCO2 BLDA: 26.5 MM HG (ref 36–44)
PH BLDA: 7.29 [PH] (ref 7.35–7.45)
PH UR STRIP.AUTO: 5.5 [PH] (ref 4.5–8)
PHOSPHATE SERPL-MCNC: 5.7 MG/DL (ref 2.7–4.5)
PHOSPHATE SERPL-MCNC: 5.7 MG/DL (ref 2.7–4.5)
PHOSPHATE SERPL-MCNC: 5.8 MG/DL (ref 2.7–4.5)
PLATELET # BLD AUTO: 282 THOUSANDS/UL (ref 149–390)
PMV BLD AUTO: 8.8 FL (ref 8.9–12.7)
PO2 BLDA: 36.1 MM HG (ref 75–129)
POTASSIUM SERPL-SCNC: 3.5 MMOL/L (ref 3.5–5.3)
POTASSIUM SERPL-SCNC: 3.5 MMOL/L (ref 3.5–5.5)
POTASSIUM SERPL-SCNC: 3.6 MMOL/L (ref 3.5–5.3)
POTASSIUM SERPL-SCNC: 3.6 MMOL/L (ref 3.5–5.3)
POTASSIUM SERPL-SCNC: 3.9 MMOL/L (ref 3.5–5.3)
POTASSIUM SERPL-SCNC: 3.9 MMOL/L (ref 3.5–5.3)
PR INTERVAL: 132 MS
PROT SERPL-MCNC: 6.1 G/DL (ref 6.4–8.2)
PROT UR STRIP-MCNC: ABNORMAL MG/DL
QRS AXIS: 52 DEGREES
QRSD INTERVAL: 102 MS
QT INTERVAL: 404 MS
QTC INTERVAL: 491 MS
RBC # BLD AUTO: 2.07 MILLION/UL (ref 3.88–5.62)
RBC #/AREA URNS AUTO: ABNORMAL /HPF
RH BLD: POSITIVE
RSV B RNA SPEC QL NAA+PROBE: NORMAL
SODIUM SERPL-SCNC: 127 MMOL/L (ref 134–143)
SODIUM SERPL-SCNC: 129 MMOL/L (ref 136–145)
SODIUM SERPL-SCNC: 129 MMOL/L (ref 136–145)
SODIUM SERPL-SCNC: 131 MMOL/L (ref 136–145)
SODIUM SERPL-SCNC: 133 MMOL/L (ref 136–145)
SODIUM SERPL-SCNC: 133 MMOL/L (ref 136–145)
SP GR UR STRIP.AUTO: 1.02 (ref 1–1.03)
SPECIMEN EXPIRATION DATE: NORMAL
SPECIMEN SOURCE: ABNORMAL
T WAVE AXIS: 89 DEGREES
TACROLIMUS BLD-MCNC: 12.5 NG/ML (ref 2–20)
TACROLIMUS BLD-MCNC: 16.9 NG/ML (ref 2–20)
UROBILINOGEN UR QL STRIP.AUTO: 0.2 E.U./DL
VANCOMYCIN SERPL-MCNC: 11.2 UG/ML
VENTRICULAR RATE: 89 BPM
WBC # BLD AUTO: 13.53 THOUSAND/UL (ref 4.31–10.16)
WBC #/AREA URNS AUTO: ABNORMAL /HPF

## 2018-12-20 PROCEDURE — 36556 INSERT NON-TUNNEL CV CATH: CPT | Performed by: NURSE PRACTITIONER

## 2018-12-20 PROCEDURE — 30233N1 TRANSFUSION OF NONAUTOLOGOUS RED BLOOD CELLS INTO PERIPHERAL VEIN, PERCUTANEOUS APPROACH: ICD-10-PCS | Performed by: INTERNAL MEDICINE

## 2018-12-20 PROCEDURE — 86900 BLOOD TYPING SEROLOGIC ABO: CPT | Performed by: NURSE PRACTITIONER

## 2018-12-20 PROCEDURE — 99291 CRITICAL CARE FIRST HOUR: CPT | Performed by: INTERNAL MEDICINE

## 2018-12-20 PROCEDURE — 36600 WITHDRAWAL OF ARTERIAL BLOOD: CPT

## 2018-12-20 PROCEDURE — 82805 BLOOD GASES W/O2 SATURATION: CPT | Performed by: NURSE PRACTITIONER

## 2018-12-20 PROCEDURE — 80048 BASIC METABOLIC PNL TOTAL CA: CPT | Performed by: NURSE PRACTITIONER

## 2018-12-20 PROCEDURE — 80048 BASIC METABOLIC PNL TOTAL CA: CPT | Performed by: ANESTHESIOLOGY

## 2018-12-20 PROCEDURE — 82948 REAGENT STRIP/BLOOD GLUCOSE: CPT

## 2018-12-20 PROCEDURE — 93306 TTE W/DOPPLER COMPLETE: CPT

## 2018-12-20 PROCEDURE — C9113 INJ PANTOPRAZOLE SODIUM, VIA: HCPCS | Performed by: NURSE PRACTITIONER

## 2018-12-20 PROCEDURE — 86920 COMPATIBILITY TEST SPIN: CPT

## 2018-12-20 PROCEDURE — 0DJ08ZZ INSPECTION OF UPPER INTESTINAL TRACT, VIA NATURAL OR ARTIFICIAL OPENING ENDOSCOPIC: ICD-10-PCS | Performed by: INTERNAL MEDICINE

## 2018-12-20 PROCEDURE — 82272 OCCULT BLD FECES 1-3 TESTS: CPT | Performed by: NURSE PRACTITIONER

## 2018-12-20 PROCEDURE — 80197 ASSAY OF TACROLIMUS: CPT | Performed by: NURSE PRACTITIONER

## 2018-12-20 PROCEDURE — P9021 RED BLOOD CELLS UNIT: HCPCS

## 2018-12-20 PROCEDURE — 99253 IP/OBS CNSLTJ NEW/EST LOW 45: CPT | Performed by: INTERNAL MEDICINE

## 2018-12-20 PROCEDURE — 87040 BLOOD CULTURE FOR BACTERIA: CPT | Performed by: INTERNAL MEDICINE

## 2018-12-20 PROCEDURE — 87147 CULTURE TYPE IMMUNOLOGIC: CPT | Performed by: INTERNAL MEDICINE

## 2018-12-20 PROCEDURE — 80202 ASSAY OF VANCOMYCIN: CPT | Performed by: NURSE PRACTITIONER

## 2018-12-20 PROCEDURE — 86850 RBC ANTIBODY SCREEN: CPT | Performed by: NURSE PRACTITIONER

## 2018-12-20 PROCEDURE — 93306 TTE W/DOPPLER COMPLETE: CPT | Performed by: INTERNAL MEDICINE

## 2018-12-20 PROCEDURE — 80053 COMPREHEN METABOLIC PANEL: CPT | Performed by: NURSE PRACTITIONER

## 2018-12-20 PROCEDURE — 85018 HEMOGLOBIN: CPT | Performed by: NURSE PRACTITIONER

## 2018-12-20 PROCEDURE — 99254 IP/OBS CNSLTJ NEW/EST MOD 60: CPT | Performed by: INTERNAL MEDICINE

## 2018-12-20 PROCEDURE — 81001 URINALYSIS AUTO W/SCOPE: CPT | Performed by: NURSE PRACTITIONER

## 2018-12-20 PROCEDURE — 83735 ASSAY OF MAGNESIUM: CPT | Performed by: NURSE PRACTITIONER

## 2018-12-20 PROCEDURE — 86901 BLOOD TYPING SEROLOGIC RH(D): CPT | Performed by: NURSE PRACTITIONER

## 2018-12-20 PROCEDURE — 93010 ELECTROCARDIOGRAM REPORT: CPT | Performed by: INTERNAL MEDICINE

## 2018-12-20 PROCEDURE — P9016 RBC LEUKOCYTES REDUCED: HCPCS

## 2018-12-20 PROCEDURE — 85025 COMPLETE CBC W/AUTO DIFF WBC: CPT | Performed by: NURSE PRACTITIONER

## 2018-12-20 PROCEDURE — 02H633Z INSERTION OF INFUSION DEVICE INTO RIGHT ATRIUM, PERCUTANEOUS APPROACH: ICD-10-PCS | Performed by: INTERNAL MEDICINE

## 2018-12-20 PROCEDURE — 71045 X-RAY EXAM CHEST 1 VIEW: CPT

## 2018-12-20 PROCEDURE — 84100 ASSAY OF PHOSPHORUS: CPT | Performed by: NURSE PRACTITIONER

## 2018-12-20 RX ORDER — CALCITRIOL 0.25 UG/1
0.5 CAPSULE, LIQUID FILLED ORAL DAILY
Status: CANCELLED | OUTPATIENT
Start: 2018-12-20

## 2018-12-20 RX ORDER — TACROLIMUS 0.5 MG/1
0.5 CAPSULE ORAL EVERY 12 HOURS SCHEDULED
Status: DISCONTINUED | OUTPATIENT
Start: 2018-12-21 | End: 2018-12-21

## 2018-12-20 RX ORDER — SODIUM CHLORIDE 9 MG/ML
500 INJECTION, SOLUTION INTRAVENOUS CONTINUOUS
Status: DISCONTINUED | OUTPATIENT
Start: 2018-12-20 | End: 2018-12-20

## 2018-12-20 RX ORDER — AZATHIOPRINE 50 MG/1
50 TABLET ORAL 2 TIMES DAILY
Status: DISCONTINUED | OUTPATIENT
Start: 2018-12-20 | End: 2018-12-20

## 2018-12-20 RX ORDER — PROPOFOL 10 MG/ML
INJECTION, EMULSION INTRAVENOUS AS NEEDED
Status: DISCONTINUED | OUTPATIENT
Start: 2018-12-20 | End: 2018-12-20 | Stop reason: SURG

## 2018-12-20 RX ORDER — HEPARIN SODIUM 5000 [USP'U]/ML
5000 INJECTION, SOLUTION INTRAVENOUS; SUBCUTANEOUS EVERY 8 HOURS SCHEDULED
Status: DISCONTINUED | OUTPATIENT
Start: 2018-12-20 | End: 2018-12-20

## 2018-12-20 RX ORDER — PREDNISONE 10 MG/1
5 TABLET ORAL DAILY
Status: CANCELLED | OUTPATIENT
Start: 2018-12-20

## 2018-12-20 RX ORDER — ASPIRIN 81 MG/1
81 TABLET ORAL DAILY
Status: CANCELLED | OUTPATIENT
Start: 2018-12-20

## 2018-12-20 RX ORDER — TACROLIMUS 0.5 MG/1
0.5 CAPSULE ORAL EVERY 12 HOURS SCHEDULED
Status: DISCONTINUED | OUTPATIENT
Start: 2018-12-21 | End: 2018-12-20

## 2018-12-20 RX ORDER — ATORVASTATIN CALCIUM 40 MG/1
40 TABLET, FILM COATED ORAL DAILY
Status: CANCELLED | OUTPATIENT
Start: 2018-12-20

## 2018-12-20 RX ORDER — DOXYCYCLINE HYCLATE 100 MG/1
100 CAPSULE ORAL EVERY 12 HOURS SCHEDULED
Status: DISCONTINUED | OUTPATIENT
Start: 2018-12-20 | End: 2018-12-20

## 2018-12-20 RX ORDER — ATORVASTATIN CALCIUM 40 MG/1
40 TABLET, FILM COATED ORAL
Status: DISCONTINUED | OUTPATIENT
Start: 2018-12-20 | End: 2018-12-20

## 2018-12-20 RX ORDER — ACETAMINOPHEN 325 MG/1
650 TABLET ORAL EVERY 6 HOURS PRN
Status: CANCELLED | OUTPATIENT
Start: 2018-12-20

## 2018-12-20 RX ORDER — POTASSIUM CHLORIDE 20 MEQ/1
40 TABLET, EXTENDED RELEASE ORAL ONCE
Status: DISCONTINUED | OUTPATIENT
Start: 2018-12-20 | End: 2018-12-21

## 2018-12-20 RX ORDER — HEPARIN SODIUM 5000 [USP'U]/ML
5000 INJECTION, SOLUTION INTRAVENOUS; SUBCUTANEOUS EVERY 8 HOURS SCHEDULED
Status: CANCELLED | OUTPATIENT
Start: 2018-12-20

## 2018-12-20 RX ORDER — SODIUM CHLORIDE 9 MG/ML
2000 INJECTION, SOLUTION INTRAVENOUS CONTINUOUS
Status: DISCONTINUED | OUTPATIENT
Start: 2018-12-20 | End: 2018-12-20

## 2018-12-20 RX ORDER — ONDANSETRON 2 MG/ML
4 INJECTION INTRAMUSCULAR; INTRAVENOUS EVERY 6 HOURS PRN
Status: DISCONTINUED | OUTPATIENT
Start: 2018-12-20 | End: 2018-12-23 | Stop reason: HOSPADM

## 2018-12-20 RX ORDER — VANCOMYCIN HYDROCHLORIDE 1 G/200ML
20 INJECTION, SOLUTION INTRAVENOUS ONCE
Status: COMPLETED | OUTPATIENT
Start: 2018-12-20 | End: 2018-12-20

## 2018-12-20 RX ORDER — POTASSIUM CHLORIDE 14.9 MG/ML
20 INJECTION INTRAVENOUS ONCE
Status: COMPLETED | OUTPATIENT
Start: 2018-12-20 | End: 2018-12-20

## 2018-12-20 RX ORDER — ACETAMINOPHEN 325 MG/1
650 TABLET ORAL EVERY 6 HOURS PRN
Status: DISCONTINUED | OUTPATIENT
Start: 2018-12-20 | End: 2018-12-23 | Stop reason: HOSPADM

## 2018-12-20 RX ORDER — POTASSIUM CHLORIDE 20 MEQ/1
20 TABLET, EXTENDED RELEASE ORAL ONCE
Status: COMPLETED | OUTPATIENT
Start: 2018-12-20 | End: 2018-12-20

## 2018-12-20 RX ORDER — CEFEPIME HYDROCHLORIDE 1 G/50ML
1000 INJECTION, SOLUTION INTRAVENOUS EVERY 24 HOURS
Status: CANCELLED | OUTPATIENT
Start: 2018-12-20

## 2018-12-20 RX ORDER — DEXTROSE AND SODIUM CHLORIDE 5; .9 G/100ML; G/100ML
150 INJECTION, SOLUTION INTRAVENOUS CONTINUOUS
Status: DISCONTINUED | OUTPATIENT
Start: 2018-12-20 | End: 2018-12-22

## 2018-12-20 RX ORDER — CALCITRIOL 0.25 UG/1
0.5 CAPSULE, LIQUID FILLED ORAL DAILY
Status: DISCONTINUED | OUTPATIENT
Start: 2018-12-20 | End: 2018-12-23 | Stop reason: HOSPADM

## 2018-12-20 RX ORDER — METOCLOPRAMIDE HYDROCHLORIDE 5 MG/ML
5 INJECTION INTRAMUSCULAR; INTRAVENOUS EVERY 6 HOURS PRN
Status: CANCELLED | OUTPATIENT
Start: 2018-12-20

## 2018-12-20 RX ORDER — SODIUM CHLORIDE 9 MG/ML
250 INJECTION, SOLUTION INTRAVENOUS CONTINUOUS
Status: DISCONTINUED | OUTPATIENT
Start: 2018-12-20 | End: 2018-12-20

## 2018-12-20 RX ORDER — SODIUM BICARBONATE 650 MG/1
650 TABLET ORAL 2 TIMES DAILY
Status: CANCELLED | OUTPATIENT
Start: 2018-12-20

## 2018-12-20 RX ORDER — SODIUM CHLORIDE 9 MG/ML
250 INJECTION, SOLUTION INTRAVENOUS CONTINUOUS
Status: DISCONTINUED | OUTPATIENT
Start: 2018-12-20 | End: 2018-12-21

## 2018-12-20 RX ORDER — PANTOPRAZOLE SODIUM 40 MG/1
40 INJECTION, POWDER, FOR SOLUTION INTRAVENOUS EVERY 12 HOURS SCHEDULED
Status: DISCONTINUED | OUTPATIENT
Start: 2018-12-20 | End: 2018-12-20

## 2018-12-20 RX ORDER — SODIUM BICARBONATE 650 MG/1
650 TABLET ORAL 2 TIMES DAILY
Status: DISCONTINUED | OUTPATIENT
Start: 2018-12-20 | End: 2018-12-20

## 2018-12-20 RX ORDER — ASPIRIN 81 MG/1
81 TABLET ORAL DAILY
Status: DISCONTINUED | OUTPATIENT
Start: 2018-12-20 | End: 2018-12-20

## 2018-12-20 RX ORDER — DOXYCYCLINE HYCLATE 100 MG/1
100 CAPSULE ORAL EVERY 12 HOURS SCHEDULED
Status: CANCELLED | OUTPATIENT
Start: 2018-12-20

## 2018-12-20 RX ORDER — ONDANSETRON 2 MG/ML
4 INJECTION INTRAMUSCULAR; INTRAVENOUS EVERY 6 HOURS PRN
Status: CANCELLED | OUTPATIENT
Start: 2018-12-20

## 2018-12-20 RX ORDER — SODIUM CHLORIDE 9 MG/ML
INJECTION, SOLUTION INTRAVENOUS CONTINUOUS PRN
Status: DISCONTINUED | OUTPATIENT
Start: 2018-12-20 | End: 2018-12-20 | Stop reason: SURG

## 2018-12-20 RX ORDER — PREDNISONE 10 MG/1
5 TABLET ORAL DAILY
Status: DISCONTINUED | OUTPATIENT
Start: 2018-12-20 | End: 2018-12-20

## 2018-12-20 RX ORDER — CEFAZOLIN SODIUM 2 G/50ML
2000 SOLUTION INTRAVENOUS EVERY 24 HOURS
Status: DISCONTINUED | OUTPATIENT
Start: 2018-12-20 | End: 2018-12-22

## 2018-12-20 RX ORDER — METOCLOPRAMIDE HYDROCHLORIDE 5 MG/ML
5 INJECTION INTRAMUSCULAR; INTRAVENOUS EVERY 6 HOURS PRN
Status: DISCONTINUED | OUTPATIENT
Start: 2018-12-20 | End: 2018-12-23 | Stop reason: HOSPADM

## 2018-12-20 RX ADMIN — PREDNISONE 5 MG: 10 TABLET ORAL at 09:09

## 2018-12-20 RX ADMIN — SODIUM CHLORIDE: 0.9 INJECTION, SOLUTION INTRAVENOUS at 13:00

## 2018-12-20 RX ADMIN — DEXTROSE AND SODIUM CHLORIDE 250 ML/HR: 5; .9 INJECTION, SOLUTION INTRAVENOUS at 17:15

## 2018-12-20 RX ADMIN — PROPOFOL 60 MG: 10 INJECTION, EMULSION INTRAVENOUS at 13:22

## 2018-12-20 RX ADMIN — AZATHIOPRINE 50 MG: 50 TABLET ORAL at 09:12

## 2018-12-20 RX ADMIN — Medication 125 ML/HR: at 15:48

## 2018-12-20 RX ADMIN — VANCOMYCIN HYDROCHLORIDE 1000 MG: 1 INJECTION, SOLUTION INTRAVENOUS at 06:26

## 2018-12-20 RX ADMIN — SODIUM CHLORIDE 1 UNITS/HR: 9 INJECTION, SOLUTION INTRAVENOUS at 05:41

## 2018-12-20 RX ADMIN — DEXTROSE AND SODIUM CHLORIDE 250 ML/HR: 5; .9 INJECTION, SOLUTION INTRAVENOUS at 12:44

## 2018-12-20 RX ADMIN — PROPOFOL 40 MG: 10 INJECTION, EMULSION INTRAVENOUS at 13:25

## 2018-12-20 RX ADMIN — DEXTROSE AND SODIUM CHLORIDE 150 ML/HR: 5; .9 INJECTION, SOLUTION INTRAVENOUS at 22:45

## 2018-12-20 RX ADMIN — TACROLIMUS 1.5 MG: 0.5 CAPSULE ORAL at 09:11

## 2018-12-20 RX ADMIN — HYDROCORTISONE SODIUM SUCCINATE 50 MG: 100 INJECTION, POWDER, FOR SOLUTION INTRAMUSCULAR; INTRAVENOUS at 11:38

## 2018-12-20 RX ADMIN — POTASSIUM CHLORIDE 20 MEQ: 200 INJECTION, SOLUTION INTRAVENOUS at 19:11

## 2018-12-20 RX ADMIN — SODIUM CHLORIDE 5 UNITS/HR: 9 INJECTION, SOLUTION INTRAVENOUS at 11:14

## 2018-12-20 RX ADMIN — TOPICAL ANESTHETIC 1 SPRAY: 200 SPRAY DENTAL; PERIODONTAL at 13:15

## 2018-12-20 RX ADMIN — HEPARIN SODIUM 5000 UNITS: 5000 INJECTION INTRAVENOUS; SUBCUTANEOUS at 05:43

## 2018-12-20 RX ADMIN — CALCITRIOL 0.5 MCG: 0.25 CAPSULE ORAL at 09:08

## 2018-12-20 RX ADMIN — DOXYCYCLINE HYCLATE 100 MG: 100 CAPSULE ORAL at 09:10

## 2018-12-20 RX ADMIN — Medication 125 ML/HR: at 06:20

## 2018-12-20 RX ADMIN — SODIUM CHLORIDE: 0.9 INJECTION, SOLUTION INTRAVENOUS at 13:30

## 2018-12-20 RX ADMIN — CEFAZOLIN SODIUM 2000 MG: 2 SOLUTION INTRAVENOUS at 17:45

## 2018-12-20 RX ADMIN — SODIUM CHLORIDE 1000 ML: 0.9 INJECTION, SOLUTION INTRAVENOUS at 11:15

## 2018-12-20 RX ADMIN — SODIUM CHLORIDE 8 MG/HR: 9 INJECTION, SOLUTION INTRAVENOUS at 20:49

## 2018-12-20 RX ADMIN — POTASSIUM CHLORIDE 20 MEQ: 1500 TABLET, EXTENDED RELEASE ORAL at 11:39

## 2018-12-20 RX ADMIN — SODIUM BICARBONATE 650 MG TABLET 650 MG: at 09:10

## 2018-12-20 RX ADMIN — PANTOPRAZOLE SODIUM 40 MG: 40 INJECTION, POWDER, FOR SOLUTION INTRAVENOUS at 09:08

## 2018-12-20 RX ADMIN — Medication 125 ML/HR: at 05:41

## 2018-12-20 RX ADMIN — SODIUM CHLORIDE 8 MG/HR: 9 INJECTION, SOLUTION INTRAVENOUS at 12:53

## 2018-12-20 RX ADMIN — LIDOCAINE HYDROCHLORIDE 80 MG: 20 INJECTION, SOLUTION INTRAVENOUS at 13:22

## 2018-12-20 NOTE — OP NOTE
OPERATIVE REPORT  PATIENT NAME: Miller Hahn    :  1970  MRN: [id number]  Pt Location: [facility] SHOW ROOM    SURGERY DATE: 2018    Surgeon(s) and Role:     Jelani Nuñez MD - Primary    Preop Diagnosis:  Gastrointestinal hemorrhage, unspecified gastrointestinal hemorrhage type [K92 2]    Post-Op Diagnosis Codes:     * Gastrointestinal hemorrhage, unspecified gastrointestinal hemorrhage type [K92 2]    Procedure(s) (LRB):  ESOPHAGOGASTRODUODENOSCOPY (EGD) in ICU (N/A)    Specimen(s):  * No specimens in log *    Estimated Blood Loss:   Minimal    Drains:       Anesthesia Type:   Conscious Sedation     Operative Indications:  Gastrointestinal hemorrhage, unspecified gastrointestinal hemorrhage type [K92 2]      Operative Findings:  POSSIBLE DISTAL ESOPHAGEAL MASS, CLOT IN DISTAL ESOPHAGUS      Complications:   None    Procedure and Technique:  THE UPPER ENDOSCOPE WAS PASSED UNDER DIRECT VISUALIZATION INTO THE ESOPHAGUS  THERE WAS A LOT OF PILL GRANULES IN THE ENTIRE ESOPHAGUS HOWEVER IN THE DISTAL 3RD OF THE ESOPHAGUS THERE APPEARS TO BE A CLOT WITH A MASS  I DID TRY TO PUSH SOME OF THIS INTO THE STOMACH BUT IT WAS ADHERENT AND SLIGHTLY FRIABLE  THE ENDOSCOPE WAS TRANSIENTLY PLACED IN THE STOMACH AND THERE IS NO BLOOD IN THE STOMACH  BECAUSE OF THE CLOT AND ADHERENT MATERIAL FROM PRIOR MEDICATIONS AND NOTED EROSIVE ESOPHAGITIS NO FURTHER THERAPY WAS ATTEMPTED  HE SHOULD HAVE RE-ENDOSCOPY IF TRANSFERRED TODAY TOMORROW OTHERWISE WE WILL RE-ENDOSCOPED HIM AFTER KEEP HIM NPO AGAIN  HE NEEDS HIGH-DOSE ACID SUPPRESSION  THERE WAS NO ACTIVE BLEEDING SEEN       I was present for the entire procedure    Patient Disposition:  hemodynamically stable    SIGNATURE: Zuly Cruz MD  DATE: 2018  TIME: 1:32 PM
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
## 2018-12-20 NOTE — PROGRESS NOTES
Pulse dosing vanco   Random level will be checked 12/21 at 0630 with AM labs  Re-dose at 10-15mg/kg if random level < 20

## 2018-12-20 NOTE — CONSULTS
Consultation - Nephrology   Uriah Almendarez 51 y o  male MRN: [id number]  Unit/Bed#: ICU 06 Encounter: [id number]      ASSESSMENT & PLAN:    50-year-old male with a past medical history of diabetes mellitus type 1, coronary artery disease status post MI x2 stents and hypertension with end-stage kidney disease status post living related donor transplant last in 2001 with a baseline creatinine of 2 5 presents to [facility]'s with right ankle pain and weakness found to have DKA and acute anemia complicated by acute kidney injury    1  Acute kidney injury-in the setting of acute anemia and DKA with osteomyelitis  -baseline creatinine has been around 2 5 in May in August 2018 and in September of 2018 creatinine was closer to 3  -follows up at the [address] [place] [address] had a recent orthopedic surgery and he says that after the surgery his creatinine did increase  -now with acute kidney injury on top of his chronic kidney disease in a living related donor  -strict intakes and outputs and daily weights  -check a renal transplant ultrasound  -urinalysis reviewed with a high specific gravity 2+ proteinuria, 2-4 WBCs and 1-2 RBCs  -continue IV fluid resuscitation  -currently no indication for renal replacement therapy  -continue to keep mean arterial pressures greater than 65  -medications reviewed appropriately dosed    2  Stage IIIB to stage 4 chronic kidney disease status post living related donor transplant x2  -baseline creatinine is around 2 5 but most recently was around 3, low muscle mass, creatinine may be over estimated GFR  -also with elevated phosphorus and the 6-9 range which may could indicate a lower GFR as well    3  Azotemia  - this is in the setting of an active GI bleed mental status is okay  -will monitor closely for EGD later today    4   Hyponatremia  -serum sodium 129, blood glucose 120 now  -will monitor with fluid resuscitation actively being done  -will check a urine osmolality, serum osmolality, urine sodium at the onset  -further workup will be done if does not improve with fluid resuscitation    5  Hyperphosphatemia  -even prior to this hospitalization serum phosphorus was around 6  -given continue to monitor and when acute issues resolve can start a phosphorus binder    6  Acute anemia ruling out GI bleed   -for endoscopy later today  -transfusing packed red blood cells-would use leuko reduced irradiated CMV-negative blood    7  Immunosuppressed the setting of renal transplant  -currently on prednisone 5 mg daily at home on Hydrocortisone in the hospital  -was on azathioprine 50 mg daily which is on hold given current critical illness  -tacrolimus dose was 1 5 mg at Q 12 last tacrolimus level trough was 12 holding evening dose starting 0 5 mg q 12 in repeating a trough tacrolimus level tomorrow    8  Immuno prophylaxis  -currently not on any immuno prophylaxis   -antibiotics per critical care team    9  Anion gap metabolic acidosis secondary to Diabetic ketoacidosis  -checking an osmolar gap    10  Osteomyelitis  - Continues on cefepime and doxycycline  -for further evaluation once transferred         HISTORY OF PRESENT ILLNESS:  Requesting Physician: Yajaira Hassan DO  Reason for Consult:  Acute kidney injury    Nicki Romero is a 51y o  year old male who was admitted to [facility] after presenting with worsening right foot pain   A renal consultation is requested today for assistance in the management of acute kidney injury    He has a past medical history of a living related donor transplant in [address] [place] [address] and then in 2001 he had worsening right foot pain since Friday, progressive weakness and malaise for 2-3 days he had recently had a foot surgery at [address] where he also gets his transplant follow-up he follows up with his primary care physician but no other nephrologist in the [facility] he was on Imuran, tacrolimus and prednisone any and a right foot osteomyelitis status post fusion he had a positive culture and was on doxycycline for this he presented to [facility] and subsequently lab work showed a severe anion gap metabolic acidosis with a normal lactate level and was found to be in DKA his being treated for his DKA and his BUN and creatinine remain elevated he is making urine feeling better at this time electrolytes are currently stable he denies any chest pain or shortness of Breath fevers or chills    PAST MEDICAL HISTORY:  Past Medical History:   Diagnosis Date    Cardiac arrest ([facility] )     Diabetes mellitus ([facility] )     Diabetes mellitus type 1 ([facility] )     Hypertension     Infection at site of external fixator pin ([facility] )     MI (myocardial infarction) ([place] [address] )     Pneumonia     Last Assessed [id number]    Renal failure     Renal transplant, status post 07/21/2007       PAST SURGICAL HISTORY:  Past Surgical History:   Procedure Laterality Date    ANKLE FRACTURE SURGERY      ANKLE HARDWARE REMOVAL Right 7/31/2017    Procedure: REMOVAL HARDWARE ANKLE;  Surgeon: Alexander Do MD;  Location: [facility];  Service: Orthopedics    ANKLE HARDWARE REMOVAL Right 8/17/2017    Procedure: TIBIA FAILED HARDWARE REMOVAL;  Surgeon: Alexander Do MD;  [facility];  Service: Orthopedics    CARDIAC SURGERY      2 stents    CLOSED REDUCTION ANKLE Right 7/3/2017    Procedure: CLOSED REDUCTION DISTAL TIB-FIB AND CASTING VS;  Surgeon: Alexander Do MD;  [facility]: [facility];  Service: Orthopedics    EYE SURGERY      FRACTURE SURGERY      ORIF Rt Ankle    GLUTAMIC ACID DECARBOXYLASE (HISTORICAL)      IR PICC LINE  8/20/2018    IR VENOUS LINE REMOVAL  10/5/2018    NEPHRECTOMY TRANSPLANTED ORGAN      [place] OPEN TREATMENT FRACTURE DISTAL TIBIA FIBULA Right 7/3/2017    Procedure: OPEN REDUCTION W/ INTERNAL FIXATION (ORIF);   Surgeon: Alexander Do MD;  [facility];  Service: Orthopedics    TOE AMPUTATION Right 10/27/2016    Procedure: AMPUTATION TOE;  Surgeon: Svetlana Robertson DPM;  Location: [facility];  Service:        ALLERGIES:  No Known Allergies    SOCIAL HISTORY:  History   Alcohol Use No     History   Drug Use No     History   Smoking Status    Never Smoker   Smokeless Tobacco    Never Used       FAMILY HISTORY:  Family History   Problem Relation Age of Onset    Diabetes Brother     Coronary artery disease Mother        MEDICATIONS:    Current Facility-Administered Medications:     acetaminophen (TYLENOL) tablet 650 mg, 650 mg, Oral, Q6H PRN, Arlyce Felix Spatzer, CRNP    calcitriol (ROCALTROL) capsule 0 5 mcg, 0 5 mcg, Oral, Daily, Arlyce Felix Spatzer, CRNP, 0 5 mcg at 12/20/18 0908    dextrose 5 % and sodium chloride 0 9 % infusion, 250 mL/hr, Intravenous, Continuous, Beéln K Bilofsky, CRNP    doxycycline hyclate (VIBRAMYCIN) capsule 100 mg, 100 mg, Oral, Q12H [address], Arlyce Felix Spatzer, CRNP, 100 mg at 12/20/18 0910    hydrocortisone sodium succinate (PF) (Solu-CORTEF) injection 50 mg, 50 mg, Intravenous, Daily, Belén K Bilofsky, CRNP    insulin regular (HumuLIN R,NovoLIN R) 1 Units/mL in sodium chloride 0 9 % 100 mL infusion, 0 1-30 Units/hr, Intravenous, Continuous, Belén K Bilofsky, CRNP    metoclopramide (REGLAN) injection 5 mg, 5 mg, Intravenous, Q6H PRN, Arlyce Felix Spatzer, CRNP    ondansetron [facility]) injection 4 mg, 4 mg, Intravenous, Q6H PRN, Arlyce Felix Spatzer, CRNP    pantoprazole (PROTONIX) 80 mg in sodium chloride 0 9 % 100 mL infusion, 8 mg/hr, Intravenous, Continuous, Belén K Bilofsky, CRNP    potassium chloride (K-DUR,KLOR-CON) CR tablet 20 mEq, 20 mEq, Oral, Once, Belén K Bilofsky, CRNP    sodium bicarbonate 150 mEq in dextrose 5 % 1,000 mL infusion, 125 mL/hr, Intravenous, Continuous, Arlyce Elvira Spakaraner, CRNP, Last Rate: 125 mL/hr at 12/20/18 0620, 125 mL/hr at 12/20/18 0620    sodium chloride 0 9 % bolus 1,000 mL, 1,000 mL, Intravenous, Once, GAVINO Gonzalez    sodium chloride 0 9 % infusion, 250 mL/hr, Intravenous, Continuous, Felisha Jimenez, CRNP    [START ON 12/21/2018] tacrolimus (PROGRAF) capsule 0 5 mg, 0 5 mg, Oral, Q12H [address], Lucbhavesh Specking, DO    REVIEW OF SYSTEMS:  All the systems were reviewed and were negative except as documented on the HPI        PHYSICAL EXAM:  Current Weight: Weight - Scale: 53 5 kg (117 lb 15 1 oz)  First Weight: Weight - Scale: 53 5 kg (117 lb 15 1 oz)  Vitals:    12/20/18 0700 12/20/18 0758 12/20/18 0900 12/20/18 0921   BP: 98/52  115/55 125/61   Pulse: (!) 110  (!) 108 (!) 114   Resp:   (!) 23 22   Temp:  98 1 °F (36 7 °C) 99 1 °F (37 3 °C) 99 5 °F (37 5 °C)   TempSrc:  Temporal Temporal Temporal   SpO2: 99%  100% 100%   Weight:       Height:           Intake/Output Summary (Last 24 hours) at 12/20/18 1108  Last data filed at 12/20/18 0726   Gross per 24 hour   Intake              200 ml   Output                0 ml   Net              200 ml     General: conscious, cooperative, in not acute distress  Eyes: conjunctivae pink, anicteric sclerae  ENT: lips and mucous membranes moist  Neck: supple, no JVD  Chest: clear breath sounds bilateral, no crackles, ronchus or wheezings  CVS: distinct S1 & S2, normal rate, regular rhythm  Abdomen: soft, non-tender, non-distended, normoactive bowel sounds  Extremities: no edema of both legs  Skin: no rash  Neuro: awake, alert, oriented    Invasive Devices:      Lab Results:     Results from last 7 days  Lab Units 12/20/18  0519 12/20/18  0428 12/20/18  0104 12/19/18  2104 12/19/18  1502 12/19/18  0913 12/19/18  0828   WBC Thousand/uL  --  13 53*  --   --   --   --  19 49*   HEMOGLOBIN g/dL 6 1* 6 1*  --   --   --   --  8 7*   HEMATOCRIT %  --  19 4*  --   --   --   --  26 2*   PLATELETS Thousands/uL  --  282  --   --   --   --  347   POTASSIUM mmol/L  --  3 9  3 9 3 5 3 9 3 8  --  3 4*   CHLORIDE mmol/L  --  96*  96* 98 96* 94*  --  89*   CO2 mmol/L  --  12*  12* 11* 11* 13*  --  13*   BUN mg/dL  --  162*  162* 144* 140* 125*  --  129*   CREATININE mg/dL  --  6 71*  6 71* 6 26* 6 20* 6 12*  --  6 69*   CALCIUM mg/dL  --  7 9*  7 9* 7 8* 7 7* 7 6*  --  7 6*   MAGNESIUM mg/dL  --  2 2  --   --   --   --  2 4   PHOSPHORUS mg/dL  --   --   --   --   --   --  7 8*   ALK PHOS U/L  --  157*  --  122 123  --  173*   ALT U/L  --  14  --  9 10  --  18   AST U/L  --  15  --  10* 11*  --  14   BLOOD CULTURE   --   --   --   --   --  Staphylococcus aureus*  --         Other Studies:  Chest x-ray shows no pneumothorax after central line was placed    Did have a transplant renal ultrasound which showed increased cortical echogenicity suggestive of underlying renal parenchymal disease no hydronephrosis although the external iliac artery and proximal renal artery at the site of anastomosis were not well evaluated the renal artery and vein are patent at the renal hilum there is elevation of the resistant indices basis of underlying renal parenchymal disease

## 2018-12-20 NOTE — PLAN OF CARE
Problem: SAFETY ADULT  Goal: Maintain or return to baseline ADL function  INTERVENTIONS:  -  Assess patient's ability to carry out ADLs; assess patient's baseline for ADL function and identify physical deficits which impact ability to perform ADLs (bathing, care of mouth/teeth, toileting, grooming, dressing, etc )  - Assess/evaluate cause of self-care deficits   - Assess range of motion  - Assess patient's mobility; develop plan if impaired  - Assess patient's need for assistive devices and provide as appropriate  - Encourage maximum independence but intervene and supervise when necessary  ¯ Involve family in performance of ADLs  ¯ Assess for home care needs following discharge   ¯ Request OT consult to assist with ADL evaluation and planning for discharge  ¯ Provide patient education as appropriate   Outcome: Adequate for Discharge

## 2018-12-20 NOTE — UTILIZATION REVIEW
Initial Clinical Review    TRANSFER FROM [facility]    Admission: Date/Time/Statement: 12/20/18 @ 0417     Orders Placed This Encounter   Procedures    Inpatient Admission     Standing Status:   Standing     Number of Occurrences:   1     Order Specific Question:   Admitting Physician     Answer:   Wing Trinidad [[id number]]     Order Specific Question:   Level of Care     Answer:   Critical Care [15]     Order Specific Question:   Estimated length of stay     Answer:   More than 2 Midnights     Order Specific Question:   Certification     Answer:   I certify that inpatient services are medically necessary for this patient for a duration of greater than two midnights  See H&P and MD Progress Notes for additional information about the patient's course of treatment  Chief Complaint: No chief complaint on file  History of Illness: Shala Esparza is a 51 y o  male with a history of DM Type I, CAD with MI status post 2 stents, status post renal transplant x1 in 1988 and 2001 on chronic immunosuppression and ankle fracture s/p ORIF with recurrent osteomyelitis who presents as a transfer from [facility]  The patient originally presented to [address] [place] [address] on 12/19 complaining of right ankle pain and generalized weakness  He also endorses that he was having some low-grade fevers  Patient had a Charcot deformity for which she had surgery at [facility] in August 2018  Since then he has had recurrent osteomyelitis  Work-up in the ED was concerning for developing sepsis so the patient was transferred to [address] [place] [address] for inpatient admission  He was found to be acidotic and in acute renal failure with a  Creatinine >6  He was started on a bicarb gtt however his acidosis did not significantly improve and he was transferred to [facility] with possible need for RRT    The case was discussed with [facility] and they agreed to accept the patient given his history of renal transplant and recurrent infections however there were no beds immediately available          ED Vital Signs:   ED Triage Vitals   Temperature Pulse Respirations Blood Pressure SpO2   12/20/18 0758 12/20/18 0535 12/20/18 0900 12/20/18 0535 12/20/18 0535   98 1 °F (36 7 °C) (!) 114 (!) 23 93/61 100 %      Temp Source Heart Rate Source Patient Position - Orthostatic VS BP Location FiO2 (%)   12/20/18 0758 -- -- -- --   Temporal          Pain Score       12/20/18 0426       5        Wt Readings from Last 1 Encounters:   12/20/18 53 5 kg (117 lb 15 1 oz)       Vital Signs (abnormal):    above    Abnormal Labs/Diagnostic Test Results:    WBC   13 53  H/H  6 1/19 4  Na  129  Chloride   96  CO2  12  AG  21  BUN/Creat    162///6 71  Alk phos   157  Albumin   2 0             Past Medical/Surgical History:    Active Ambulatory Problems     Diagnosis Date Noted    Osteomyelitis ([facility] ) 10/26/2016    Foot pain 10/26/2016    Hypertension 10/26/2016    Type 1 diabetes mellitus ([facility] ) 10/26/2016    Renal transplant, status post 10/26/2016    Hyperkalemia 10/26/2016    Sepsis ([facility] ) 10/27/2016    MIKE (acute kidney injury) ([facility] ) 10/27/2016    Osteomyelitis ([facility] ) 12/07/2016    Poor circulation 12/07/2016    Closed fracture of distal end of right tibia with routine healing 07/03/2017    Elevated platelet count 49/21/7836    Chronic kidney disease, stage IV (severe) ([facility] ) 09/23/2017    Chronic osteomyelitis of tibia ([facility] ) 09/23/2017    Immunosuppression ([facility] ) 11/04/2014    Hypertension 08/04/2010    DKA (diabetic ketoacidoses) [facility] ) 09/23/2017    Elevated troponin 09/23/2017    Diarrhea 09/23/2017    Elevated INR 09/23/2017    Cellulitis of ankle 09/23/2017    Non-ST elevation myocardial infarction (NSTEMI), type 2 09/23/2017    Urinary retention 09/24/2017    Abnormal x-ray 09/24/2017    Severe sepsis ([facility] ) 12/19/2018     Resolved Ambulatory Problems     Diagnosis Date Noted    Cellulitis 10/26/2016    Pain from implanted hardware 08/17/2017     Past Medical History: Diagnosis Date    Cardiac arrest ([facility] )     Diabetes mellitus ([facility] )     Diabetes mellitus type 1 ([place] [address] )     Hypertension     Infection at site of external fixator pin ([facility] )     MI (myocardial infarction) ([place] [address] )     Pneumonia     Renal failure     Renal transplant, status post 07/21/2007       Admitting Diagnosis: Severe sepsis ([place] [address] ) [A41 9, R65 20]    Age/Sex: 51 y o  male    1  Assessment/Plan: Sepsis secondary to gram-positive bacteremia history of recurring right foot osteomyelitis  · Admit to inpatient status as his condition is expected to require greater than a 2 midnight stay  · Patient had been on doxycycline for previous corynebacteria  2 of 2 blood cultures from 12/19 are preliminarily positive for gram-positive cocci in clusters  Lactic acid was normal   Patient had been on doxycycline cefepime and vancomycin prior to arrival   Will discontinue the cefepime and continue doxycycline and vancomycin for now  Consult ID for recommendations  · Hemodynamics are stable  · Has been afebrile  Follows temps and WBC count which is improved from prior to admission   2  Acute oliguric renal failure on CKD with history of previous renal transplant  · Patient has undergone 2 right-sided renal transplants 1st in 1988 then in 2001 at [facility]  He is maintain on chronic Imuran, tacrolimus and prednisone  These have been held since admission  · Will consult nephrology with concern for need for dialysis  · Pt has been accepted as [facility] for transfer when a bed is available  · Trend renal indices and monitor intake and output  3  Acute on chronic anemia  · Unclear etiology  There are no obvious sign of active bleeding  Hemoglobin was 8 7 on 12/19 and it is 6 1 on arrival this a m  Will transfuse 2 units packed red blood cells  4  Anion gap metabolic acidosis secondary to renal failure  · Continue Bicarbonate infusion for now  Trend BMP  5   NSTEMI Type II secondary to demand  · Troponin peaked at 0 26 trending down  No chest pain or ischemic changes on ECG  6  DM type 1  · Beta-Hydroxybutyrate is slightly elevated although urine ketones are negative  Will add dextrose to bicarbonate infusion in case there is an element of starvation ketoacidosis or DKA    Continue insulin infusion with goal to maintain -180  Hyponatremia    Admission Orders:   IP  12/20  @    0522  Scheduled Meds:   Current Facility-Administered Medications:  acetaminophen 650 mg Oral Q6H PRN Arlyce Felix Spatzer, CRNP    calcitriol 0 5 mcg Oral Daily Arlyce Felix Spatzer, CRNP    dextrose 5 % and sodium chloride 0 9 % 250 mL/hr Intravenous Continuous Belén K Bilofsky, CRNP    doxycycline hyclate 100 mg Oral Q12H [address] Arlyce Felix Spatzer, CRNP    hydrocortisone sodium succinate 50 mg Intravenous Daily Belén K Bilofsky, CRNP    insulin regular (HumuLIN R,NovoLIN R) infusion 0 1-30 Units/hr Intravenous Continuous Belén K Bilofsky, CRNP    metoclopramide 5 mg Intravenous Q6H PRN Arlyce Felix Spatzer, CRNP    ondansetron 4 mg Intravenous Q6H PRN Arlyce Felix Spatzer, CRNP    pantoprozole (PROTONIX) infusion (Continuous) 8 mg/hr Intravenous Continuous Belén K Bilofsky, CRNP    potassium chloride 20 mEq Oral Once Felisha Finch Bilofsky, CRNP    sodium bicarbonate infusion 125 mL/hr Intravenous Continuous Arlyce Felix Spakaraner, CRNP Last Rate: 125 mL/hr (12/20/18 5439)   sodium chloride 1,000 mL Intravenous Once Gini VELASCO Bilofskrox, CRNP    sodium chloride 250 mL/hr Intravenous Continuous Belén K Bilofsky, CRNP    [START ON 12/21/2018] tacrolimus 0 5 mg Oral Q12H [address] Brien Cali, DO      Continuous Infusions:   dextrose 5 % and sodium chloride 0 9 % 250 mL/hr    insulin regular (HumuLIN R,NovoLIN R) infusion 0 1-30 Units/hr    pantoprozole (PROTONIX) infusion (Continuous) 8 mg/hr    sodium bicarbonate infusion 125 mL/hr Last Rate: 125 mL/hr (12/20/18 0620)   sodium chloride 250 mL/hr      PRN Meds:   acetaminophen    metoclopramide    ondansetron      2 DE  PT/OT  Cons nephrology  Cons  ID  2  U  PRBC  Cons GI  hmgb  q 6 hrs  Insert central line      Per  Gi Consult:    Acute on chronic anemia  Patient presented with a hemoglobin is 6 1 associated with weakness and fatigue  No obvious signs of active GI bleed  Previous hemoglobin on 12/19 was 8 7  Patient to receive 2 units of packed red blood cells will monitor for GI bleed  May be need EGD consent obtained  Stools for occult blood pending  Pantoprazole 40 mg IV b i d  Hold Plavix  Frequent H& H    2   Sepsis secondary to gram-positive bacteremia history of recurrent right foot osteomyelitis  On antibiotics ID consult id  3  Acute oliguric renal failure on CKD with history of previous renal transplant primary following  4  Non STEMI type 2 secondary to demand  No ischemic changes on EKG  5  Hyponatremia        [address] Utilization Review Department  Phone: [phone]; Fax [phone]  [email or web address]  ATTENTION: Please call with any questions or concerns to [phone]  and carefully listen to the prompts so that you are directed to the right person  Send all requests for admission clinical reviews, approved or denied determinations and any other requests to fax [phone]   All voicemails are confidential

## 2018-12-20 NOTE — H&P
History & Physical Exam - Critical Care   Marcus Villalobos 51 y o  male MRN: [id number]  Unit/Bed#: ICU 06 Encounter: [id number]      Assessment/Plan:  1  Sepsis secondary to gram-positive bacteremia history of recurring right foot osteomyelitis  · Admit to inpatient status as his condition is expected to require greater than a 2 midnight stay  · Patient had been on doxycycline for previous corynebacteria  2 of 2 blood cultures from 12/19 are preliminarily positive for gram-positive cocci in clusters  Lactic acid was normal   Patient had been on doxycycline cefepime and vancomycin prior to arrival   Will discontinue the cefepime and continue doxycycline and vancomycin for now  Consult ID for recommendations  · Hemodynamics are stable  · Has been afebrile  Follows temps and WBC count which is improved from prior to admission   2  Acute oliguric renal failure on CKD with history of previous renal transplant  · Patient has undergone 2 right-sided renal transplants 1st in 1988 then in 2001 at [facility]  He is maintain on chronic Imuran, tacrolimus and prednisone  These have been held since admission  · Will consult nephrology with concern for need for dialysis  · Pt has been accepted as [facility] for transfer when a bed is available  · Trend renal indices and monitor intake and output  3  Acute on chronic anemia  · Unclear etiology  There are no obvious sign of active bleeding  Hemoglobin was 8 7 on 12/19 and it is 6 1 on arrival this a m  Will transfuse 2 units packed red blood cells  4  Anion gap metabolic acidosis secondary to renal failure  · Continue Bicarbonate infusion for now  Trend BMP  5  NSTEMI Type II secondary to demand  · Troponin peaked at 0 26 trending down  No chest pain or ischemic changes on ECG  6  DM type 1  · Beta-Hydroxybutyrate is slightly elevated although urine ketones are negative    Will add dextrose to bicarbonate infusion in case there is an element of starvation ketoacidosis or DKA  Continue insulin infusion with goal to maintain -180  7  Hyponatremia  · Sodium is improving on bicarb infusion  Continue to trend    _____________________________________________________________________      HPI:    Alejandrina Porter is a 51 y o  male with a history of DM Type I, CAD with MI status post 2 stents, status post renal transplant x1 in 1988 and 2001 on chronic immunosuppression and ankle fracture s/p ORIF with recurrent osteomyelitis who presents as a transfer from [facility]  The patient originally presented to [address] [place] [address] on 12/19 complaining of right ankle pain and generalized weakness  He also endorses that he was having some low-grade fevers  Patient had a Charcot deformity for which she had surgery at [facility] in August 2018  Since then he has had recurrent osteomyelitis  Work-up in the ED was concerning for developing sepsis so the patient was transferred to [address] [place] [address] for inpatient admission  He was found to be acidotic and in acute renal failure with a  Creatinine >6  He was started on a bicarb gtt however his acidosis did not significantly improve and he was transferred to [facility] with possible need for RRT  The case was discussed with [facility] and they agreed to accept the patient given his history of renal transplant and recurrent infections however there were no beds immediately available  Review of Systems:  Review of Systems   Constitutional: Positive for appetite change (decreased food and water intake), fatigue and fever  HENT: Negative for sinus pressure and sore throat  Eyes: Negative for visual disturbance  Respiratory: Negative for cough and shortness of breath  Cardiovascular: Positive for leg swelling (R lower extremity)  Negative for chest pain  Gastrointestinal: Positive for nausea  Negative for abdominal pain, constipation, diarrhea and vomiting  Endocrine: Negative      Genitourinary: Positive for decreased urine volume  Negative for dysuria, flank pain and hematuria  Musculoskeletal: Negative for myalgias  Skin: Positive for rash (R lower extremity)  Allergic/Immunologic: Negative  Neurological: Positive for weakness  Negative for syncope  Hematological: Negative  Psychiatric/Behavioral: Negative  Full 12 point review of systems was performed  Aside from what was mentioned in the HPI, it is otherwise negative  Historical Information   Past Medical History:   Diagnosis Date    Cardiac arrest ([facility] )     Diabetes mellitus ([facility] )     Diabetes mellitus type 1 ([facility] )     Hypertension     Infection at site of external fixator pin ([facility] )     MI (myocardial infarction) ([place] [address] )     Pneumonia     Last Assessed [id number]    Renal failure     Renal transplant, status post 07/21/2007     Past Surgical History:   Procedure Laterality Date    ANKLE FRACTURE SURGERY      ANKLE HARDWARE REMOVAL Right 7/31/2017    Procedure: REMOVAL HARDWARE ANKLE;  Surgeon: Yolanda Clemons MD;  [facility];  Service: Orthopedics    ANKLE HARDWARE REMOVAL Right 8/17/2017    Procedure: TIBIA FAILED HARDWARE REMOVAL;  Surgeon: Yolanda Clemons MD;  [facility];  Service: Orthopedics    CARDIAC SURGERY      2 stents    CLOSED REDUCTION ANKLE Right 7/3/2017    Procedure: CLOSED REDUCTION DISTAL TIB-FIB AND CASTING VS;  Surgeon: Yolanda Clemons MD;  [facility];  Service: Orthopedics    EYE SURGERY      FRACTURE SURGERY      ORIF Rt Ankle    GLUTAMIC ACID DECARBOXYLASE (HISTORICAL)      IR PICC LINE  8/20/2018    IR VENOUS LINE REMOVAL  10/5/2018    NEPHRECTOMY TRANSPLANTED ORGAN      [place] OPEN TREATMENT FRACTURE DISTAL TIBIA FIBULA Right 7/3/2017    Procedure: OPEN REDUCTION W/ INTERNAL FIXATION (ORIF);   Surgeon: Yolanda Clemons MD;  [facility];  Service: Orthopedics    TOE AMPUTATION Right 10/27/2016    Procedure: AMPUTATION TOE;  Surgeon: Diego White DPM;  Location: [facility];  Service: Social History   History   Alcohol Use No     History   Drug Use No     History   Smoking Status    Never Smoker   Smokeless Tobacco    Never Used       Family History:   Family History   Problem Relation Age of Onset    Diabetes Brother     Coronary artery disease Mother        Medications:  Pertinent medications were reviewed    Current Facility-Administered Medications:  acetaminophen 650 mg Oral Q6H PRN Ladon Hasty Spatzer, CRNP   aspirin 81 mg Oral Daily Ladon Hasty Spatzer, CRNP   atorvastatin 40 mg Oral Daily With [employer] Spatzer, CRNP   calcitriol 0 5 mcg Oral Daily Ladon Hasty Spatzer, CRNP   cefepime 1,000 mg Intravenous Q24H Ladon Hasty Spatzer, CRNP   doxycycline hyclate 100 mg Oral Q12H [address] Ladon Hasty Spatzer, CRNP   heparin (porcine) 5,000 Units Subcutaneous Q8H [address] Ladon Hasty Spatzer, CRNP   insulin regular (HumuLIN R,NovoLIN R) infusion 0 3-21 Units/hr Intravenous Titrated Ladon Hasty Spatzer, CRNP   metoclopramide 5 mg Intravenous Q6H PRN Ladon Hasty Spatzer, CRNP   ondansetron 4 mg Intravenous Q6H PRN Ladon Hasty Spatzer, CRNP   predniSONE 5 mg Oral Daily Ladon Hasty Spatzer, CRNP   sodium bicarbonate infusion 125 mL/hr Intravenous Continuous Ladon Hasty Spatzer, CRNP   sodium bicarbonate 650 mg Oral BID Ladon Hasty Spatzer, CRNP         No Known Allergies      Vitals:   Ht 5' 4" (1 626 m)   Wt 53 5 kg (117 lb 15 1 oz)   BMI 20 25 kg/m²   Body mass index is 20 25 kg/m²     ,    ,        No intake or output data in the 24 hours ending 12/20/18 0536  Invasive Devices     Peripheral Intravenous Line            Peripheral IV 12/19/18 Right Hand less than 1 day    Peripheral IV 12/19/18 Right Wrist less than 1 day                Physical Exam:  Gen:  Somnolent but easily arousable, appropriate, no acute distress  HE:  Atraumatic, normocephalic, extraocular movements intact, pupils 4 mm equal and reactive, sclerae anicteric, no nystagmus  ENT:  Oropharynx dry  Neck/lymph:  Supple, trachea midline, no JVD, no lymphadenopathy  Chest:  Slightly diminished otherwise clear to auscultation bilateral  Cor:  Single S1/S2, no murmurs, rubs, gallops, regular rhythm, tachycardic  Abd:  Soft, nontender, nondistended and bowel sounds are active  Ext:  Right lower extremity with swelling and rash, slightly tender to palpation  Neuro:  Oriented x3, cranial nerves 2-12 grossly intact, no focal deficits  Skin:  Warm, dry      Diagnostic Data:  Lab: I have personally reviewed pertinent lab results  ,   CBC:    Results from last 7 days  Lab Units 12/20/18  0428   WBC Thousand/uL 13 53*   HEMOGLOBIN g/dL 6 1*   HEMATOCRIT % 19 4*   PLATELETS Thousands/uL 282      CMP: Lab Results   Component Value Date    SODIUM 129 (L) 12/20/2018    SODIUM 129 (L) 12/20/2018    K 3 9 12/20/2018    K 3 9 12/20/2018    CL 96 (L) 12/20/2018    CL 96 (L) 12/20/2018    CO2 12 (L) 12/20/2018    CO2 12 (L) 12/20/2018     (H) 12/20/2018     (H) 12/20/2018    CREATININE 6 71 (H) 12/20/2018    CREATININE 6 71 (H) 12/20/2018    CALCIUM 7 9 (L) 12/20/2018    CALCIUM 7 9 (L) 12/20/2018    AST 15 12/20/2018    ALT 14 12/20/2018    ALKPHOS 157 (H) 12/20/2018    EGFR 9 12/20/2018    EGFR 9 12/20/2018   ,   PT/INR:   Lab Results   Component Value Date    INR 1 15 12/19/2018   ,   Troponin:   Lab Results   Component Value Date    TROPONINI 0 25 (H) 12/19/2018   ,   Magnesium: No components found for: MAG,  Phosphorous:   Lab Results   Component Value Date    PHOS 7 8 (H) 12/19/2018       Microbiology:  Blood cultures x2 - preliminarily positive for gram-positive cocci in clusters    Imaging: I have personally reviewed the pertinent imaging studies on the PACS system      EKG/telemetry/Echo:   Sinus tachycardia on telemetry      VTE Prophylaxis: Sequential compression device (Venodyne)  and Heparin    Code Status: Level 1 - Full Code    Portions of the record may have been created with voice recognition software   Occasional wrong word or "sound a like" substitutions may have occurred due to the inherent limitations of voice recognition software  Read the chart carefully and recognize, using context, where substitutions have occurred

## 2018-12-20 NOTE — PROGRESS NOTES
PLEASE NOTE PATIENT WAS ON PLAVIX RECENTLY  RE-EVALUATION OF THE ESOPHAGUS SHOULD BE DONE IN SEVERAL DAYS AS BIOPSY MAY BE NEEDED

## 2018-12-20 NOTE — PROGRESS NOTES
Critical Care Attending Note - Temporary HD Catheter placement Consent    Patient transferred from [facility] for emergent dialysis - post renal transplant with subsequent failure secondary to infectious process due to presumed lower extremity hardware infection  This was discussed with Dr Trace Bautista from [facility]  Patient severe electrolyte and acid-base derangements refractory to conservative measures (bicarb gtt)  I discussed this telephonically with the patient and he demonstrated understanding  I discussed need for venous temporary HD catheter placement to all performance of dialysis  I discussed risks to include but not limited to bleeding, infection, injury to nerves and blood vessels, and pneumothorax (collapsed lung)  He telephonically consented to proceed with placement and this was witnessed by his bedside nurse  Please see hardcopy consent for signature  Addendum [id number] - consent for blood and blood productions also telephonically obtained from patient        Noted Hgb drop to 6 1 - repeat evaluation ongoing, no evidence of active bleeding    Jose Ha, DO

## 2018-12-20 NOTE — H&P (VIEW-ONLY)
Patient MRN: [id number]  Date of Service: 12/20/2018  Referring Physician: Dr Dennys Cardozo  Provider Creating Note: GAVINO Nichols  PCP: Siddhartha Sanchez  Reason for Consult:  GI bleed  HPI  Toni Galloway is a 51 y o  male who was admitted with Acute renal failure (ARF) ([facility] )  He a gentleman with significant medical history including diabetes mellitus type 1, coronary artery disease with MI status post 2 stents in February 2018  Status post renal transplant in 1988 and 2001 on chronic immunosuppression, ankle fracture with status post ORIF with recurrent osteomyelitis  He presented to [facility] with a complaint of worsening weakness right ankle pain as well as low-grade fever  He was transferred to [facility] for further workup  He states that he was also having dark stools however he is on oral iron  He is also on Plavix  He has also noted decreased appetite with nausea without vomiting x1 week and right lower extremity leg swelling  He has not had any bright red blood per rectum  In he has no complaints of any abdominal pain  Hemoglobin on admission was 6 1 with normal MCV and [facility] BUN is 162 creatinine is 6 71, INR is 1 15  Patient also noted to have a slight troponin spill  Patient has been diagnosed with sepsis secondary to gram-positive bacteremia      Past Medical History:   Diagnosis Date    Cardiac arrest ([facility] )     Diabetes mellitus ([facility] )     Diabetes mellitus type 1 ([facility] )     Hypertension     Infection at site of external fixator pin ([facility] )     MI (myocardial infarction) ([facility] )     Pneumonia     Last Assessed [id number]    Renal failure     Renal transplant, status post 07/21/2007     Past Surgical History:   Procedure Laterality Date    ANKLE FRACTURE SURGERY      ANKLE HARDWARE REMOVAL Right 7/31/2017    Procedure: REMOVAL HARDWARE ANKLE;  Surgeon: Patria Amanda MD;  [facility];  Service: Orthopedics    ANKLE HARDWARE REMOVAL Right 8/17/2017 Procedure: TIBIA FAILED HARDWARE REMOVAL;  Surgeon: Yaquelin Yoon MD;  [facility]: [facility];  Service: Orthopedics    CARDIAC SURGERY      2 stents    CLOSED REDUCTION ANKLE Right 7/3/2017    Procedure: CLOSED REDUCTION DISTAL TIB-FIB AND CASTING VS;  Surgeon: Yaquelin Yoon MD;  [facility];  Service: Orthopedics    EYE SURGERY      FRACTURE SURGERY      ORIF Rt Ankle    GLUTAMIC ACID DECARBOXYLASE (HISTORICAL)      IR PICC LINE  8/20/2018    IR VENOUS LINE REMOVAL  10/5/2018    NEPHRECTOMY TRANSPLANTED ORGAN      [place] OPEN TREATMENT FRACTURE DISTAL TIBIA FIBULA Right 7/3/2017    Procedure: OPEN REDUCTION W/ INTERNAL FIXATION (ORIF); Surgeon: Yaquelin Yoon MD;  [facility];  Service: Orthopedics    TOE AMPUTATION Right 10/27/2016    Procedure: AMPUTATION TOE;  Surgeon: Svetlana Robertson DPM;  [facility]: [facility];  Service:      Medications  Home Medications:   Prior to Admission medications    Medication Sig Start Date End Date Taking?  Authorizing Provider   NIFEdipine (PROCARDIA) 10 mg capsule Take 10 mg by mouth daily   Yes Historical Provider, MD   aspirin (ECOTRIN LOW STRENGTH) 81 mg EC tablet Take 81 mg by mouth daily    Historical Provider, MD   atorvastatin (LIPITOR) 40 mg tablet Take 40 mg by mouth daily    Historical Provider, MD   calcitriol (ROCALTROL) 0 5 MCG capsule Take 0 5 mcg by mouth daily    Historical Provider, MD   cefepime (MAXIPIME) 1000 mg IVPB Infuse 50 mL (1,000 mg total) into a venous catheter every 12 (twelve) hours for 7 days 12/20/18 12/27/18  Lonny Mcdonald MD   doxycycline hyclate (VIBRAMYCIN) 100 mg capsule Take 1 capsule (100 mg total) by mouth every 12 (twelve) hours for 7 days 12/19/18 12/26/18  Lonny Mcdonald MD   Heparin Sodium, Porcine, (HEPARIN, PORCINE,) 5,000 units/mL Inject 1 mL (5,000 Units total) under the skin every 8 (eight) hours 12/19/18   Lonny Mcdonald MD   insulin detemir (LEVEMIR) 100 units/mL subcutaneous injection Inject 10 Units under the skin 2 (two) times a day    Historical Provider, MD   insulin lispro (HUMALOG) 100 units/mL injection Inject 30 Units under the skin daily 5/30/18   Tawana Tuttle DO   Lactobacillus (ACIDOPHILUS PO) Take 300 mg by mouth    Historical Provider, MD   predniSONE 5 mg tablet Take 5 mg by mouth daily    Historical Provider, MD   sodium bicarbonate 650 mg tablet Take 1 tablet by mouth 2 (two) times a day 10/26/17   Rhett Conrad MD   sodium chloride Infuse 125 mL/hr into a venous catheter continuous 12/19/18   Deidre Bledsoe MD   azaTHIOprine (IMURAN) 50 mg tablet Take 50 mg by mouth daily  12/20/18  Historical Provider, MD   metoprolol succinate (TOPROL-XL) 25 mg 24 hr tablet Take 25 mg by mouth daily  12/20/18  Historical Provider, MD   multivitamin [facility]) TABS Take 1 tablet by mouth daily  12/20/18  Historical Provider, MD   NIFEdipine ER (ADALAT CC) 30 MG 24 hr tablet Take 30 mg by mouth daily  12/20/18  Historical Provider, MD   tacrolimus (PROGRAF) 0 5 mg capsule Take 0 5 mg by mouth daily In the AM and 1mg hs   12/20/18  Historical Provider, MD       Inhouse Medications    Current Facility-Administered Medications:     acetaminophen (TYLENOL) tablet 650 mg, 650 mg, Oral, Q6H PRN    atorvastatin (LIPITOR) tablet 40 mg, 40 mg, Oral, Daily With Dinner    azaTHIOprine (IMURAN) tablet 50 mg, 50 mg, Oral, BID    calcitriol (ROCALTROL) capsule 0 5 mcg, 0 5 mcg, Oral, Daily, 0 5 mcg at 12/20/18 0908    doxycycline hyclate (VIBRAMYCIN) capsule 100 mg, 100 mg, Oral, Q12H [facility], 100 mg at 12/20/18 0910    insulin regular (HumuLIN R,NovoLIN R) 1 Units/mL in sodium chloride 0 9 % 100 mL infusion, 0 3-21 Units/hr, Intravenous, Titrated, 6 Units/hr at 12/20/18 0815    metoclopramide (REGLAN) injection 5 mg, 5 mg, Intravenous, Q6H PRN    ondansetron (ZOFRAN) injection 4 mg, 4 mg, Intravenous, Q6H PRN    pantoprazole (PROTONIX) injection 40 mg, 40 mg, Intravenous, Q12H JAMARCUS, 40 mg at 12/20/18 0908    predniSONE tablet 5 mg, 5 mg, Oral, Daily, 5 mg at 12/20/18 0909    sodium bicarbonate 150 mEq in dextrose 5 % 1,000 mL infusion, 125 mL/hr, Intravenous, Continuous, 125 mL/hr at 12/20/18 0620    sodium bicarbonate tablet 650 mg, 650 mg, Oral, BID, 650 mg at 12/20/18 0910    tacrolimus (PROGRAF) capsule 1 5 mg, 1 5 mg, Oral, Q12H [address]    Allergies  No Known Allergies  Social History   reports that he has never smoked  He has never used smokeless tobacco  He reports that he does not drink alcohol or use drugs  Family History  Family History   Problem Relation Age of Onset    Diabetes Brother     Coronary artery disease Mother      ROS  ROS: Denies CP, SOB, fever, abdominal pain  Positive for nausea, decreased appetite, fatigue, dark stool, right lower extremity swelling, decreased urine output  All others negative except as noted in HPI  Objective   Vitals  Blood pressure 115/55, pulse (!) 108, temperature 99 1 °F (37 3 °C), temperature source Temporal, resp  rate (!) 23, height 5' 4" (1 626 m), weight 53 5 kg (117 lb 15 1 oz), SpO2 100 %  General: Alert, no apparent distress  Eyes: No scleral icterus  ENT: MMM  Card: RRR no murmur  Lungs: Clear to ascultation b/l  No wheezes, rales, rhonchi  Abdomen: Soft  Nontender  Nondistended  Bowel sounds present and normoactive  No hepatosplenomegaly    Skin: No jaundice  Neuro: Alert and oriented x3  Extremities:  Right lower extremity swelling with rash      Laboratory Studies  Lab Results   Component Value Date    WBC 13 53 (H) 12/20/2018    HGB 6 1 (LL) 12/20/2018    HCT 19 4 (L) 12/20/2018     12/20/2018    MCV 94 12/20/2018     Lab Results   Component Value Date    CREATININE 6 71 (H) 12/20/2018    CREATININE 6 71 (H) 12/20/2018     (H) 12/20/2018     (H) 12/20/2018    SODIUM 129 (L) 12/20/2018    SODIUM 129 (L) 12/20/2018    K 3 9 12/20/2018    K 3 9 12/20/2018    CL 96 (L) 12/20/2018    CL 96 (L) 12/20/2018    CO2 12 (L) 12/20/2018    CO2 12 (L) 12/20/2018    GLUCOSE 460 (H) 02/14/2018    CALCIUM 7 9 (L) 12/20/2018    CALCIUM 7 9 (L) 12/20/2018    PROT 6 8 11/06/2015    ALKPHOS 157 (H) 12/20/2018    BILITOT 0 45 11/06/2015    AST 15 12/20/2018    ALT 14 12/20/2018     Lab Results   Component Value Date    PROTIME 14 2 12/19/2018    INR 1 15 12/19/2018       Imaging and Other Studies        Assessment and Plan:  1  Acute on chronic anemia  Patient presented with a hemoglobin is 6 1 associated with weakness and fatigue  No obvious signs of active GI bleed  Previous hemoglobin on 12/19 was 8 7  Patient to receive 2 units of packed red blood cells will monitor for GI bleed  May be need EGD consent obtained  Stools for occult blood pending  Pantoprazole 40 mg IV b i d  Hold Plavix  Frequent H& H    2   Sepsis secondary to gram-positive bacteremia history of recurrent right foot osteomyelitis  On antibiotics ID consult id  3  Acute oliguric renal failure on CKD with history of previous renal transplant primary following  4  Non STEMI type 2 secondary to demand  No ischemic changes on EKG    5  Hyponatremia      Principal Problem:    Acute renal failure (ARF) (HCC)  Active Problems:    Type 1 diabetes mellitus ([facility] )    Renal transplant, status post    Immunosuppression (HCC)    Severe sepsis ([facility] )      GAVINO Muhammad

## 2018-12-20 NOTE — PROGRESS NOTES
Vancomycin Assessment    Berto Beebe is a 51 y o  male who is currently receiving vancomycin 1000mg Iv Once  (Pulse Dosing) for bacteremia     Relevant clinical data and objective history reviewed:  Creatinine   Date Value Ref Range Status   12/20/2018 6 71 (H) 0 60 - 1 30 mg/dL Final     Comment:     Standardized to IDMS reference method   12/20/2018 6 71 (H) 0 60 - 1 30 mg/dL Final     Comment:     Standardized to IDMS reference method   12/20/2018 6 26 (H) 0 70 - 1 30 mg/dL Final     Comment:     Standardized to IDMS reference method   11/06/2015 1 26 0 60 - 1 30 mg/dL Final     Comment:     Standardized to IDMS reference method   06/05/2015 1 07 0 60 - 1 30 mg/dL Final     Comment:     Standardized to IDMS reference method   12/14/2014 1 13 0 60 - 1 30 mg/dL Final     Comment:     Standardized to IDMS reference method     Vancomycin Rm   Date Value Ref Range Status   12/20/2018 11 2 ug/mL Final     /56   Pulse (!) 116   Ht 5' 4" (1 626 m)   Wt 53 5 kg (117 lb 15 1 oz)   SpO2 99%   BMI 20 25 kg/m²   No intake/output data recorded  Lab Results   Component Value Date/Time     (H) 12/20/2018 04:28 AM     (H) 12/20/2018 04:28 AM    BUN 31 (H) 11/06/2015 07:34 AM    WBC 13 53 (H) 12/20/2018 04:28 AM    WBC 6 52 11/06/2015 07:34 AM    HGB 6 1 (LL) 12/20/2018 05:19 AM    HGB 11 7 (L) 11/06/2015 07:34 AM    HCT 19 4 (L) 12/20/2018 04:28 AM    HCT 35 4 (L) 11/06/2015 07:34 AM    MCV 94 12/20/2018 04:28 AM    MCV 89 11/06/2015 07:34 AM     12/20/2018 04:28 AM     11/06/2015 07:34 AM     Temp Readings from Last 3 Encounters:   12/20/18 98 7 °F (37 1 °C) (Tympanic)   12/19/18 98 5 °F (36 9 °C) (Temporal)   04/13/18 98 4 °F (36 9 °C) (Temporal)     Vancomycin Days of Therapy: 30      Assessment/Plan  The patient is currently on vancomycin utilizing pulse dosing based on actual body weight  Baseline risks associated with therapy include: pre-existing renal impairment    The patient is currently receiving 1000mg Iv Once  (Pulse Dosing) and is clinically appropriate and dose will be continued  Pharmacy will also follow closely for s/sx of nephrotoxicity, infusion reactions and appropriateness of therapy  BMP and CBC will be ordered per protocol  Plan for trough as patient approaches steady state, prior to the other  dose at approximately 1900 12/20  Due to infection severity, will target a trough of 15-20 (appropriate for most indications)   Pharmacy will continue to follow the patients culture results and clinical progress daily      Sidney Degroot, Pharmacist

## 2018-12-20 NOTE — PROCEDURES
Central Line Insertion  Date/Time: 12/20/2018 7:42 AM  Performed by: Amna Hayward  Authorized by: Amna Hayward     Patient location:  Bedside  Consent:     Consent obtained:  Verbal and written    Consent given by:  Patient    Risks discussed:  Arterial puncture, bleeding, infection, incorrect placement, nerve damage and pneumothorax    Alternatives discussed:  No treatment and delayed treatment  Universal protocol:     Procedure explained and questions answered to patient or proxy's satisfaction: yes      Required blood products, implants, devices, and special equipment available: yes      Site/side marked: yes      Immediately prior to procedure, a time out was called: yes      Patient identity confirmed:  Verbally with patient, hospital-assigned identification number and arm band  Pre-procedure details:     Hand hygiene: Hand hygiene performed prior to insertion      Sterile barrier technique: All elements of maximal sterile technique followed      Skin preparation:  ChloraPrep    Skin preparation agent: Skin preparation agent completely dried prior to procedure    Indications:     Central line indications: dialysis    Anesthesia (see MAR for exact dosages):      Anesthesia method:  Local infiltration    Local anesthetic:  Lidocaine 1% w/o epi  Procedure details:     Location:  Right internal jugular    Vessel type: vein      Laterality:  Right    Approach: percutaneous technique used      Patient position:  Reverse Trendelenburg    Catheter type:  Double lumen    Landmarks identified: yes      Ultrasound guidance: yes      Sterile ultrasound techniques: Sterile gel and sterile probe covers were used      Number of attempts:  1    Successful placement: yes    Post-procedure details:     Post-procedure:  Dressing applied and line sutured    Assessment:  Blood return through all ports, no pneumothorax on x-ray, free fluid flow and placement verified by x-ray    Post-procedure complications: none Patient tolerance of procedure:   Tolerated well, no immediate complications

## 2018-12-20 NOTE — INTERVAL H&P NOTE
H&P reviewed  After examining the patient I find no changes in the patients condition since the H&P had been written    SEE TODAY'S NOTE

## 2018-12-20 NOTE — NURSING NOTE
Patient AAOx3,on O2 2L NC  On Bicarb drip @ 75 ml/hr  Regular insulin drip started @ 2138Pm @ 10 u/hr (Algorithm 3)( see MAR for titration) and titrated accordingly  FS checked every 2 hrs as  Ordered by Dr Shireen Gaviria   Blood results monitored,EKG  Done  Patient is voiding  Dr Shireen Gaviria called to transfer pt to [address] [address] for further management  Spouse (Stephie Olson )notified  Report given to Nurse Pacheco Fleming  FS rechecked @ prior to leaving,FS was 59 mg/dl  Patient given juice x1 ,Regular insulin drip discontinued at this time   Pt was transferred to [facility] @ 3:35AM via ambulance

## 2018-12-20 NOTE — PLAN OF CARE
GASTROINTESTINAL - ADULT     Maintains adequate nutritional intake Progressing        GENITOURINARY - ADULT     Maintains or returns to baseline urinary function Progressing        HEMATOLOGIC - ADULT     Maintains hematologic stability Progressing        METABOLIC, FLUID AND ELECTROLYTES - ADULT     Electrolytes maintained within normal limits Progressing     Fluid balance maintained Progressing     Glucose maintained within target range Progressing        Nutrition/Hydration-ADULT     Nutrient/Hydration intake appropriate for improving, restoring or maintaining nutritional needs Progressing        Potential for Falls     Patient will remain free of falls Progressing        Prexisting or High Potential for Compromised Skin Integrity     Skin integrity is maintained or improved Progressing        SKIN/TISSUE INTEGRITY - ADULT     Skin integrity remains intact Progressing     Incision(s), wounds(s) or drain site(s) healing without S/S of infection Progressing     Oral mucous membranes remain intact Progressing

## 2018-12-20 NOTE — ANESTHESIA PREPROCEDURE EVALUATION
Review of Systems/Medical History  Patient summary reviewed  Chart reviewed  History of anesthetic complications PONV    Cardiovascular  EKG reviewed, Hypertension ,   Comment: NSR,  Pulmonary       GI/Hepatic    GI bleeding ,        Chronic kidney disease stage 4, Kidney transplant (16 yrs ago),        Endo/Other  Diabetes well controlled type 1 Insulin,      GYN       Hematology   Musculoskeletal       Neurology   Psychology           Physical Exam    Airway    Mallampati score: II  TM Distance: >3 FB  Neck ROM: limited     Dental   Comment: Denies loose teeth, No notable dental hx     Cardiovascular  Rhythm: regular, Rate: normal, Cardiovascular exam normal    Pulmonary  Pulmonary exam normal     Other Findings        Anesthesia Plan  ASA Score- 3 Emergent    Anesthesia Type- IV sedation with anesthesia with ASA Monitors  Additional Monitors:   Airway Plan:         Plan Factors-    Induction- intravenous  Postoperative Plan-     Informed Consent- Anesthetic plan and risks discussed with patient  I personally reviewed this patient with the CRNA  Discussed and agreed on the Anesthesia Plan with the CRNA           Labs noted  0700  mg/Dl

## 2018-12-20 NOTE — CONSULTS
REQUIRED DOCUMENTATION:     1  This service was provided via Telemedicine  2  Provider located at home  3  TeleMed provider: Ida Maciel DO   4  Identify all parties in room with patient during tele consult:  RN: Debbie Smith  After connecting through [place], patient was identified by name and date of birth and assistant checked wristband  Patient was then informed that this was a Telemedicine visit and that the exam was being conducted confidentially over secure lines  My office door was closed  No one else was in the room  Patient acknowledged consent and understanding of privacy and security of the Telemedicine visit, and gave us permission to have the assistant stay in the room in order to assist with the history and to conduct the exam   I informed the patient that I have reviewed their record in Epic and presented the opportunity for them to ask any questions regarding the visit today  The patient agreed to participate  [address] [place], [place] [address]ise [place] Giana 51 y o  male MRN: [id number]  Unit/Bed#: ICU 06 Encounter: [id number]    Physician Requesting Consult: Aspen Preciado MD    Reason for Consultation / Chief Complaint:     Malaise    Sepsis  MIKE on CKD in renal transplant patient  Hyponatremia  Metabolic Acidosis  Hyperglycema in Type I diabetic  Osteomyelitis, orthopedic hardware failure    History of Present Illness:  Cristela Ordonez is a 51 y o  male who presents [facility] ICU after transfer from [facility] ER 2/2 bed availability  Pt reports worsening R foot pain since Friday, progressive weakness and malaise for 2 to 3 days  Case discussed w/ IM earlier with plan to transfer to [facility] for continuity of care  I'm asked to see the patient this evening as the patient is accepted at [facility], but there are no beds available at this time  He has h/o renal transplant in 2001 maintained on imuran, tacrolimus, prednisone     H/O R foot osteomyelitis s/p fusion for charcot joint - cx demonstrated corynbacterium and previously on doxycycline    Since admission at [facility], Imuran, tacro have been held  Pt started on maint NSS  Abx initiated: doxy, cefepime, vancomycin  Repeat labs demonstrate stable AG met acidosis, nl lactic acid level  He is non-oliguric w/ slight improvement in BUN and Cr since initiation of IV fluids  Marked hyperglycemia  Pt reports overall feeling better since admission, but developing mild orthopnea  History obtained from chart review and the patient  Past Medical History:  Past Medical History:   Diagnosis Date    [facility]     Diabetes mellitus [facility]     Diabetes mellitus type 1 ([facility]     Hypertension     Infection [facility] external fixator [facility]     MI (myocardial infarction) ([facility]     Pneumonia     Last Assessed [id number]    Renal failure     Renal transplant, status post 07/21/2007       Past Surgical History:  Past Surgical History:   Procedure Laterality Date    ANKLE FRACTURE SURGERY      ANKLE HARDWARE REMOVAL Right 7/31/2017    Procedure: REMOVAL HARDWARE ANKLE;  Surgeon: Damaris Aragon MD;  Location: [facility];  Service: Orthopedics    ANKLE HARDWARE REMOVAL Right 8/17/2017    Procedure: TIBIA FAILED HARDWARE REMOVAL;  Surgeon: Damaris Aragon MD;  [facility]: [facility];  Service: Orthopedics    CARDIAC SURGERY      2 stents    CLOSED REDUCTION ANKLE Right 7/3/2017    Procedure: CLOSED REDUCTION DISTAL TIB-FIB AND CASTING VS;  Surgeon: Dmaaris Aragon MD;  [facility]: [facility];  Service: Orthopedics    EYE SURGERY      FRACTURE SURGERY      ORIF Rt Ankle    GLUTAMIC ACID DECARBOXYLASE (HISTORICAL)      IR PICC LINE  8/20/2018    IR VENOUS LINE REMOVAL  10/5/2018    NEPHRECTOMY TRANSPLANTED ORGAN      TN OPEN TREATMENT FRACTURE DISTAL TIBIA FIBULA Right 7/3/2017    Procedure: OPEN REDUCTION W/ INTERNAL FIXATION (ORIF);   Surgeon: Damaris Aragon MD;  [facility]: [facility];  Service: Orthopedics    TOE AMPUTATION Right 10/27/2016    Procedure: AMPUTATION TOE;  Surgeon: Jennifer Barrow DPM;  Location: [facility];  Service:        Past Family History:  Family History   Problem Relation Age of Onset    Diabetes Brother     Coronary artery disease Mother        Social History:  History   Smoking Status    Never Smoker   Smokeless Tobacco    Never Used     History   Alcohol Use No     History   Drug Use No     Marital Status: /Civil Union    Home Medications:   Prior to Admission medications    Medication Sig Start Date End Date Taking?  Authorizing Provider   NIFEdipine ER (ADALAT CC) 30 MG 24 hr tablet Take 30 mg by mouth daily   Yes Historical Provider, MD   aspirin (ECOTRIN LOW STRENGTH) 81 mg EC tablet Take 81 mg by mouth daily    Historical Provider, MD   atorvastatin (LIPITOR) 40 mg tablet Take 40 mg by mouth daily    Historical Provider, MD   azaTHIOprine (IMURAN) 50 mg tablet Take 50 mg by mouth daily    Historical Provider, MD   calcitriol (ROCALTROL) 0 5 MCG capsule Take 0 5 mcg by mouth daily    Historical Provider, MD   cefepime (MAXIPIME) 1000 mg IVPB Infuse 50 mL (1,000 mg total) into a venous catheter every 12 (twelve) hours for 7 days 12/20/18 12/27/18  Tri Lopez MD   doxycycline hyclate (VIBRAMYCIN) 100 mg capsule Take 1 capsule (100 mg total) by mouth every 12 (twelve) hours for 7 days 12/19/18 12/26/18  Tri Lopez MD   Heparin Sodium, Porcine, (HEPARIN, PORCINE,) 5,000 units/mL Inject 1 mL (5,000 Units total) under the skin every 8 (eight) hours 12/19/18   Tri Lopez MD   insulin detemir (LEVEMIR) 100 units/mL subcutaneous injection Inject 10 Units under the skin 2 (two) times a day    Historical Provider, MD   insulin lispro (HUMALOG) 100 units/mL injection Inject 30 Units under the skin daily 5/30/18   Palma Hanley DO   Lactobacillus (ACIDOPHILUS PO) Take 300 mg by mouth    Historical Provider, MD   metoprolol succinate (TOPROL-XL) 25 mg 24 hr tablet Take 25 mg by mouth daily    Historical MD Mohit   multivitamin (THERAGRAN) TABS Take 1 tablet by mouth daily    Historical MD Mohit   predniSONE 5 mg tablet Take 5 mg by mouth daily    Maribell Rueda MD   sodium bicarbonate 650 mg tablet Take 1 tablet by mouth 2 (two) times a day 10/26/17   Yanet Azevedo MD   sodium chloride Infuse 125 mL/hr into a venous catheter continuous 12/19/18   Karyle Mills, MD   tacrolimus (PROGRAF) 0 5 mg capsule Take 0 5 mg by mouth daily In the AM and 1mg hs     Maribell Rueda MD   NIFEdipine (PROCARDIA PO) Take 1 capsule by mouth daily  12/19/18  Maribell Provider, MD       Inpatient Medications:  Scheduled Meds:  Current Facility-Administered Medications:  acetaminophen 650 mg Oral Q6H PRN Karyle Mills, MD    aspirin 81 mg Oral Daily Karyle Mills, MD    atorvastatin 40 mg Oral Daily Karyle Mills, MD    [START ON 12/20/2018] calcitriol 0 5 mcg Oral Daily Karyle Mills, MD    [START ON 12/20/2018] cefepime 1,000 mg Intravenous Q24H Mark Lischner, DO    doxycycline hyclate 100 mg Oral Q12H [address] Ward Lombardo MD    heparin (porcine) 5,000 Units Subcutaneous [facility] Karyle Mills, MD    insulin regular (HumuLIN R,NovoLIN R) infusion 0 3-21 Units/hr Intravenous Titrated Mark Lischner, DO Last Rate: 10 Units/hr (12/19/18 2138)   metoclopramide 5 mg Intravenous Q6H PRN Karyle Mills, MD    ondansetron 4 mg Intravenous Q6H PRN Karyle Mills, MD    Syliva Linea ON 12/20/2018] predniSONE 5 mg Oral Daily Karyle Mills, MD    sodium bicarbonate infusion 75 mL/hr Intravenous Continuous Mark Lischner, DO Last Rate: 75 mL/hr (12/19/18 2134)   sodium bicarbonate 650 mg Oral BID Karyle Mills, MD      Continuous Infusions:  insulin regular (HumuLIN R,NovoLIN R) infusion 0 3-21 Units/hr Last Rate: 10 Units/hr (12/19/18 2138)   sodium bicarbonate infusion 75 mL/hr Last Rate: 75 mL/hr (12/19/18 2134)     PRN Meds:    acetaminophen 650 mg Q6H PRN   metoclopramide 5 mg Q6H PRN   ondansetron 4 mg Q6H PRN       Allergies:  No Known Allergies    ROS:   Review of Systems   Constitutional: Positive for activity change, chills and fatigue  HENT: Negative  Eyes: Negative  Respiratory: Positive for shortness of breath (orthopnea)  Cardiovascular: Negative  Gastrointestinal: Negative  Endocrine: Positive for polyuria  Musculoskeletal:        R foot pain   Skin: Negative  Neurological: Negative  Hematological: Negative  Psychiatric/Behavioral: Negative  Vitals:  Vitals:    18 1802 18 1900 18 2000 18 2100   BP: 121/57 138/70 136/64 121/57   BP Location:  Left arm Left arm Left arm   Pulse: 82 (!) 122 (!) 119 (!) 114   Resp: (!) 24 (!) 30 (!) 28 (!) 29   Temp:   98 7 °F (37 1 °C)    TempSrc:   Tympanic    SpO2:  97% 96% 97%   Weight:       Height:         Temperature:   Temp (24hrs), Av 5 °F (36 9 °C), Min:97 4 °F (36 3 °C), Max:99 3 °F (37 4 °C)    Current Temperature: 98 7 °F (37 1 °C)    Weights:   IBW: 59 2 kg  Body mass index is 20 1 kg/m²  Hemodynamic Monitoring:  N/A     Non-Invasive/Invasive Ventilation Settings:  Respiratory    Lab Data (Last 4 hours)    None         O2/Vent Data (Last 4 hours)    None              No results found for: PHART, JCL9KAU, PO2ART, TPX7EWH, F9QQFJXC, BEART, SOURCE  SpO2: SpO2: 97 %     Physical Exam:  Physical Exam   Constitutional: Vital signs are normal  He has a sickly appearance  HENT:   Head: Normocephalic and atraumatic  Eyes: No scleral icterus  Cardiovascular: Normal rate  Neurological: He is alert  Nursing note and vitals reviewed        Labs:    Results from last 7 days  Lab Units 18  0828   WBC Thousand/uL 19 49*   HEMOGLOBIN g/dL 8 7*   HEMATOCRIT % 26 2*   PLATELETS Thousands/uL 347   NEUTROS PCT % 87*   MONOS PCT % 7       Results from last 7 days  Lab Units 18  1502 18  0828   SODIUM mmol/L 124* 125*   POTASSIUM mmol/L 3 8 3 4*   CHLORIDE mmol/L 94* 89*   CO2 mmol/L 13* 13*   ANION GAP mmol/L 17* 23*   BUN mg/dL 125* 129*   CREATININE mg/dL 6 12* 6 69*   CALCIUM mg/dL 7 6* 7 6*   ALT U/L 10 18   AST U/L 11* 14   ALK PHOS U/L 123 173*   ALBUMIN g/dL 2 8* 2 4*   TOTAL BILIRUBIN mg/dL 0 30 0 40       Results from last 7 days  Lab Units 12/19/18  0828   MAGNESIUM mg/dL 2 4   PHOSPHORUS mg/dL 7 8*        Results from last 7 days  Lab Units 12/19/18  0828   INR  1 15   PTT seconds 49*       Results from last 7 days  Lab Units 12/19/18  2104 12/19/18  1807 12/19/18  0828   TROPONIN I ng/mL 0 25* 0 26* 0 38*       Results from last 7 days  Lab Units 12/19/18  0828   LACTIC ACID mmol/L 1 0     ABG:  Lab Results   Component Value Date    PHART 7 179 (LL) 02/14/2018    YWX7DIR 31 4 (L) 02/14/2018    PO2ART 108 2 02/14/2018    CMH7LSI 11 4 (L) 02/14/2018    BEART -15 7 02/14/2018    SOURCE Brachial, Right 02/14/2018     VBG:      Results from last 7 days  Lab Units 12/19/18  0828   PROCALCITONIN ng/ml 35 23*       Imaging:  CXR w/o acute pulm process  R foot demonstrating hardware failure  No obvious osteomyelitis on image  I have personally reviewed pertinent reports  and I have personally reviewed pertinent films in PACS  EKG: Pending   Micro:  BC Pending        ______________________________________________________________________    Assessment and Plan:     Neuro: No acute issues    CV: H/O HTN: meds on hold, BP stable    Lung: No acute issue, w/ orthopnea may be developing vol o/l given renal failure  See fluids below  GI: No acute issue  Renal/F/E/N:     Hyponatremia: Serial Na, follow while on NaHCO3 infusion  AGMA - likely 2/2 MIKE  Check acetone, r/o DKA  Transition NSS to NaHCO3 at 75/hr     MIKE on CKD, s/p renal transplant              - MALIA demonstrates no obstruction              - likely 2/2 sepsis, hypovolemia, tacrolimus accumulation              - Continue prednisone    Requested stat tacrolimus level from [facility] labs, will dose adjust based on results              - a/w holding imuran - Unless sig improvement o/n, plan HD in AM for clearance, correction of met derrangements  Nephrology is consulted  Will place HD catheter in AM                 : MIKE as above  Non-oliguric  Need for accurate I/O conveyed to RN  ID: Sepsis, source likely osteomyelitis given worsening foot pain and failed hardware  No other apparent source  Follow blood cultures  A/W current abx  Dose adjusted the cefepime for renal failure  Random vanc level in AM   If transfer will be delayed beyond tomorrow will seek ID consultation  Heme: No acute issues    Endo: DMI and hyperglycemia - transitioned from SSI to Insulin gtt  Check acetone for DKA, though no ketonuria  Msk/Skin: No acute issues, unable to assess foot via telemedicine    Disposition: Plan for transfer to [facility] for continuity of care  If unable to transfer in timely manner, may transfer to [facility] for additional service needs    Patient will be seen in person tomorrow AM     Counseling / Coordination of Care  Critical care time exclusive of teaching or procedures: 50  ______________________________________________________________________    VTE Pharmacologic Prophylaxis: Heparin  VTE Mechanical Prophylaxis: sequential compression device    Invasive lines and devices: Invasive Devices     Peripheral Intravenous Line            Peripheral IV 12/19/18 Right Hand less than 1 day    Peripheral IV 12/19/18 Right Wrist less than 1 day                Code Status: Level 1 - Full Code  POA:    POLST:      Given critical illness, patient length of stay will require greater than two midnights  Portions of the record may have been created with voice recognition software  Occasional wrong word or "sound a like" substitutions may have occurred due to the inherent limitations of voice recognition software  Read the chart carefully and recognize, using context, where substitutions have occurred        Terry Cease, DO    Consults

## 2018-12-20 NOTE — PLAN OF CARE
Problem: INFECTION - ADULT  Goal: Absence or prevention of progression during hospitalization  INTERVENTIONS:  - Assess and monitor for signs and symptoms of infection  - Monitor lab/diagnostic results  - Monitor all insertion sites, i e  indwelling lines, tubes, and drains  - Monitor endotracheal (as able) and nasal secretions for changes in amount and color  - Hay appropriate cooling/warming therapies per order  - Administer medications as ordered  - Instruct and encourage patient and family to use good hand hygiene technique  - Identify and instruct in appropriate isolation precautions for identified infection/condition   Outcome: Not Progressing  Patient transferred to     Problem: SAFETY ADULT  Goal: Patient will remain free of falls  INTERVENTIONS:  - Assess patient frequently for physical needs  -  Identify cognitive and physical deficits and behaviors that affect risk of falls    -  Hay fall precautions as indicated by assessment   - Educate patient/family on patient safety including physical limitations  - Instruct patient to call for assistance with activity based on assessment  - Modify environment to reduce risk of injury  - Consider OT/PT consult to assist with strengthening/mobility   Outcome: Adequate for Discharge

## 2018-12-20 NOTE — CONSULTS
Patient MRN: [id number]  Date of Service: 12/20/2018  Referring Physician: Dr Corrine Borden  Provider Creating Note: GAVINO Chan  PCP: Eddie Troncoso  Reason for Consult:  GI bleed  HPI  Maxi Barnett is a 51 y o  male who was admitted with Acute renal failure (ARF) ([facility] )  He a gentleman with significant medical history including diabetes mellitus type 1, coronary artery disease with MI status post 2 stents in February 2018  Status post renal transplant in 1988 and 2001 on chronic immunosuppression, ankle fracture with status post ORIF with recurrent osteomyelitis  He presented to [facility] with a complaint of worsening weakness right ankle pain as well as low-grade fever  He was transferred to [facility] for further workup  He states that he was also having dark stools however he is on oral iron  He is also on Plavix  He has also noted decreased appetite with nausea without vomiting x1 week and right lower extremity leg swelling  He has not had any bright red blood per rectum  In he has no complaints of any abdominal pain  Hemoglobin on admission was 6 1 with normal MCV and MCH BUN is 162 creatinine is 6 71, INR is 1 15  Patient also noted to have a slight troponin spill  Patient has been diagnosed with sepsis secondary to gram-positive bacteremia      Past Medical History:   Diagnosis Date    Cardiac arrest ([facility] )     Diabetes mellitus ([facility] )     Diabetes mellitus type 1 ([facility] )     Hypertension     Infection at site of external fixator pin ([facility] )     [facility]     Pneumonia     Last Assessed [id number]    Renal failure     Renal transplant, status post 07/21/2007     Past Surgical History:   Procedure Laterality Date    ANKLE FRACTURE SURGERY      ANKLE HARDWARE REMOVAL Right 7/31/2017    Procedure: REMOVAL HARDWARE ANKLE;  Surgeon: Jeanna Ontiveros MD;  [facility]: [facility];  Service: Orthopedics    ANKLE HARDWARE REMOVAL Right 8/17/2017 Procedure: TIBIA FAILED HARDWARE REMOVAL;  Surgeon: Yolanda Clemons MD;  [facility]: [facility];  Service: Orthopedics    CARDIAC SURGERY      2 stents    CLOSED REDUCTION ANKLE Right 7/3/2017    Procedure: CLOSED REDUCTION DISTAL TIB-FIB AND CASTING VS;  Surgeon: Yolanda Clemons MD;  [facility]: [facility];  Service: Orthopedics    EYE SURGERY      FRACTURE SURGERY      ORIF Rt Ankle    GLUTAMIC ACID DECARBOXYLASE (HISTORICAL)      IR PICC LINE  8/20/2018    IR VENOUS LINE REMOVAL  10/5/2018    NEPHRECTOMY TRANSPLANTED ORGAN      NM OPEN TREATMENT FRACTURE DISTAL TIBIA FIBULA Right 7/3/2017    Procedure: OPEN REDUCTION W/ INTERNAL FIXATION (ORIF); Surgeon: Yolanda Clemons MD;  [facility]: [facility];  Service: Orthopedics    TOE AMPUTATION Right 10/27/2016    Procedure: AMPUTATION TOE;  Surgeon: Diego White DPM;  Location: [facility];  Service:      Medications  Home Medications:   Prior to Admission medications    Medication Sig Start Date End Date Taking?  Authorizing Provider   NIFEdipine (PROCARDIA) 10 mg capsule Take 10 mg by mouth daily   Yes Historical Provider, MD   aspirin (ECOTRIN LOW STRENGTH) 81 mg EC tablet Take 81 mg by mouth daily    Historical Provider, MD   atorvastatin (LIPITOR) 40 mg tablet Take 40 mg by mouth daily    Historical Provider, MD   calcitriol (ROCALTROL) 0 5 MCG capsule Take 0 5 mcg by mouth daily    Historical Provider, MD   cefepime (MAXIPIME) 1000 mg IVPB Infuse 50 mL (1,000 mg total) into a venous catheter every 12 (twelve) hours for 7 days 12/20/18 12/27/18  Brenda Martin MD   doxycycline hyclate (VIBRAMYCIN) 100 mg capsule Take 1 capsule (100 mg total) by mouth every 12 (twelve) hours for 7 days 12/19/18 12/26/18  Brenda Martin MD   Heparin Sodium, Porcine, (HEPARIN, PORCINE,) 5,000 units/mL Inject 1 mL (5,000 Units total) under the skin every 8 (eight) hours 12/19/18   Brenda Martin MD   insulin detemir (LEVEMIR) 100 units/mL subcutaneous injection Inject 10 Units under the skin 2 (two) times a day    Historical Provider, MD   insulin lispro (HUMALOG) 100 units/mL injection Inject 30 Units under the skin daily 5/30/18   Filemon Rowe DO   Lactobacillus (ACIDOPHILUS PO) Take 300 mg by mouth    Historical Provider, MD   predniSONE 5 mg tablet Take 5 mg by mouth daily    Historical Provider, MD   sodium bicarbonate 650 mg tablet Take 1 tablet by mouth 2 (two) times a day 10/26/17   Renan Mohamud MD   sodium chloride Infuse 125 mL/hr into a venous catheter continuous 12/19/18   Jonatan Parish MD   azaTHIOprine (IMURAN) 50 mg tablet Take 50 mg by mouth daily  12/20/18  Historical Provider, MD   metoprolol succinate (TOPROL-XL) 25 mg 24 hr tablet Take 25 mg by mouth daily  12/20/18  Historical Provider, MD   multivitamin [facility]) TABS Take 1 tablet by mouth daily  12/20/18  Historical Provider, MD   NIFEdipine ER (ADALAT CC) 30 MG 24 hr tablet Take 30 mg by mouth daily  12/20/18  Historical Provider, MD   tacrolimus (PROGRAF) 0 5 mg capsule Take 0 5 mg by mouth daily In the AM and 1mg hs   12/20/18  Historical Provider, MD       Inhouse Medications    Current Facility-Administered Medications:     acetaminophen (TYLENOL) tablet 650 mg, 650 mg, Oral, Q6H PRN    atorvastatin (LIPITOR) tablet 40 mg, 40 mg, Oral, Daily With Dinner    azaTHIOprine (IMURAN) tablet 50 mg, 50 mg, Oral, BID    calcitriol (ROCALTROL) capsule 0 5 mcg, 0 5 mcg, Oral, Daily, 0 5 mcg at 12/20/18 0908    doxycycline hyclate (VIBRAMYCIN) capsule 100 mg, 100 mg, Oral, Q12H [facility], 100 mg at 12/20/18 0910    insulin regular (HumuLIN R,NovoLIN R) 1 Units/mL in sodium chloride 0 9 % 100 mL infusion, 0 3-21 Units/hr, Intravenous, Titrated, 6 Units/hr at 12/20/18 0815    metoclopramide (REGLAN) injection 5 mg, 5 mg, Intravenous, Q6H PRN    ondansetron (ZOFRAN) injection 4 mg, 4 mg, Intravenous, Q6H PRN    pantoprazole (PROTONIX) injection 40 mg, 40 mg, Intravenous, Q12H JAMARCUS, 40 mg at 12/20/18 0908    predniSONE tablet 5 mg, 5 mg, Oral, Daily, 5 mg at 12/20/18 0909    sodium bicarbonate 150 mEq in dextrose 5 % 1,000 mL infusion, 125 mL/hr, Intravenous, Continuous, 125 mL/hr at 12/20/18 0620    sodium bicarbonate tablet 650 mg, 650 mg, Oral, BID, 650 mg at 12/20/18 0910    tacrolimus (PROGRAF) capsule 1 5 mg, 1 5 mg, Oral, Q12H [address]    Allergies  No Known Allergies  Social History   reports that he has never smoked  He has never used smokeless tobacco  He reports that he does not drink alcohol or use drugs  Family History  Family History   Problem Relation Age of Onset    Diabetes Brother     Coronary artery disease Mother      ROS  ROS: Denies CP, SOB, fever, abdominal pain  Positive for nausea, decreased appetite, fatigue, dark stool, right lower extremity swelling, decreased urine output  All others negative except as noted in HPI  Objective   Vitals  Blood pressure 115/55, pulse (!) 108, temperature 99 1 °F (37 3 °C), temperature source Temporal, resp  rate (!) 23, height 5' 4" (1 626 m), weight 53 5 kg (117 lb 15 1 oz), SpO2 100 %  General: Alert, no apparent distress  Eyes: No scleral icterus  ENT: MMM  Card: RRR no murmur  Lungs: Clear to ascultation b/l  No wheezes, rales, rhonchi  Abdomen: Soft  Nontender  Nondistended  Bowel sounds present and normoactive  No hepatosplenomegaly    Skin: No jaundice  Neuro: Alert and oriented x3  Extremities:  Right lower extremity swelling with rash      Laboratory Studies  Lab Results   Component Value Date    WBC 13 53 (H) 12/20/2018    HGB 6 1 (LL) 12/20/2018    HCT 19 4 (L) 12/20/2018     12/20/2018    MCV 94 12/20/2018     Lab Results   Component Value Date    CREATININE 6 71 (H) 12/20/2018    CREATININE 6 71 (H) 12/20/2018     (H) 12/20/2018     (H) 12/20/2018    SODIUM 129 (L) 12/20/2018    SODIUM 129 (L) 12/20/2018    K 3 9 12/20/2018    K 3 9 12/20/2018    CL 96 (L) 12/20/2018    CL 96 (L) 12/20/2018    CO2 12 (L) 12/20/2018    CO2 12 (L) 12/20/2018    GLUCOSE 460 (H) 02/14/2018    CALCIUM 7 9 (L) 12/20/2018    CALCIUM 7 9 (L) 12/20/2018    PROT 6 8 11/06/2015    ALKPHOS 157 (H) 12/20/2018    BILITOT 0 45 11/06/2015    AST 15 12/20/2018    ALT 14 12/20/2018     Lab Results   Component Value Date    PROTIME 14 2 12/19/2018    INR 1 15 12/19/2018       Imaging and Other Studies        Assessment and Plan:  1  Acute on chronic anemia  Patient presented with a hemoglobin is 6 1 associated with weakness and fatigue  No obvious signs of active GI bleed  Previous hemoglobin on 12/19 was 8 7  Patient to receive 2 units of packed red blood cells will monitor for GI bleed  May be need EGD consent obtained  Stools for occult blood pending  Pantoprazole 40 mg IV b i d  Hold Plavix  Frequent H& H    2   Sepsis secondary to gram-positive bacteremia history of recurrent right foot osteomyelitis  On antibiotics ID consult id  3  Acute oliguric renal failure on CKD with history of previous renal transplant primary following  4  Non STEMI type 2 secondary to demand  No ischemic changes on EKG    5  Hyponatremia      Principal Problem:    Acute renal failure (ARF) (HCC)  Active Problems:    Type 1 diabetes mellitus ([facility] )    Renal transplant, status post    Immunosuppression (HCC)    Severe sepsis ([facility] )      GAVINO Breaux

## 2018-12-20 NOTE — ANESTHESIA POSTPROCEDURE EVALUATION
Post-Op Assessment Note      CV Status:  Stable    Mental Status:  Alert and awake    Hydration Status:  Euvolemic    PONV Controlled:  Controlled    Airway Patency:  Patent    Post Op Vitals Reviewed: Yes          Staff: Anesthesiologist, CRNA           BP      Temp      Pulse    Resp      SpO2 no

## 2018-12-21 ENCOUNTER — APPOINTMENT (INPATIENT)
Dept: CT IMAGING | Facility: HOSPITAL | Age: 48
DRG: 559 | End: 2018-12-21
Payer: COMMERCIAL

## 2018-12-21 DIAGNOSIS — Z71.89 COMPLEX CARE COORDINATION: Primary | ICD-10-CM

## 2018-12-21 PROBLEM — R93.89 ABNORMAL X-RAY: Status: RESOLVED | Noted: 2017-09-24 | Resolved: 2018-12-21

## 2018-12-21 PROBLEM — R79.89 ELEVATED TROPONIN: Status: RESOLVED | Noted: 2017-09-23 | Resolved: 2018-12-21

## 2018-12-21 PROBLEM — R78.81 BACTEREMIA: Status: ACTIVE | Noted: 2018-12-21

## 2018-12-21 PROBLEM — R79.1 ELEVATED INR: Status: RESOLVED | Noted: 2017-09-23 | Resolved: 2018-12-21

## 2018-12-21 PROBLEM — E87.1 HYPONATREMIA: Status: RESOLVED | Noted: 2018-12-20 | Resolved: 2018-12-21

## 2018-12-21 PROBLEM — R77.8 ELEVATED TROPONIN: Status: RESOLVED | Noted: 2017-09-23 | Resolved: 2018-12-21

## 2018-12-21 PROBLEM — R79.89 ELEVATED PLATELET COUNT: Status: RESOLVED | Noted: 2017-07-31 | Resolved: 2018-12-21

## 2018-12-21 PROBLEM — N17.9 ACUTE RENAL FAILURE (ARF) (HCC): Status: RESOLVED | Noted: 2018-12-20 | Resolved: 2018-12-21

## 2018-12-21 LAB
ABO GROUP BLD BPU: NORMAL
ABO GROUP BLD BPU: NORMAL
ANION GAP SERPL CALCULATED.3IONS-SCNC: 14 MMOL/L (ref 4–13)
ANION GAP SERPL CALCULATED.3IONS-SCNC: 14 MMOL/L (ref 4–13)
ANION GAP SERPL CALCULATED.3IONS-SCNC: 15 MMOL/L (ref 4–13)
ANION GAP SERPL CALCULATED.3IONS-SCNC: 15 MMOL/L (ref 4–13)
ANION GAP SERPL CALCULATED.3IONS-SCNC: 19 MMOL/L (ref 4–13)
ARTERIAL PATENCY WRIST A: YES
BASE EXCESS BLDA CALC-SCNC: -8.4 MMOL/L
BASOPHILS # BLD AUTO: 0.02 THOUSANDS/ΜL (ref 0–0.1)
BASOPHILS NFR BLD AUTO: 0 % (ref 0–1)
BPU ID: NORMAL
BPU ID: NORMAL
BUN SERPL-MCNC: 115 MG/DL (ref 5–25)
BUN SERPL-MCNC: 125 MG/DL (ref 5–25)
BUN SERPL-MCNC: 125 MG/DL (ref 5–25)
BUN SERPL-MCNC: 132 MG/DL (ref 5–25)
BUN SERPL-MCNC: 137 MG/DL (ref 5–25)
CA-I BLD-SCNC: 1.05 MMOL/L (ref 1.12–1.32)
CALCIUM SERPL-MCNC: 7.5 MG/DL (ref 8.3–10.1)
CALCIUM SERPL-MCNC: 7.7 MG/DL (ref 8.3–10.1)
CALCIUM SERPL-MCNC: 7.7 MG/DL (ref 8.3–10.1)
CALCIUM SERPL-MCNC: 8 MG/DL (ref 8.3–10.1)
CALCIUM SERPL-MCNC: 8.1 MG/DL (ref 8.3–10.1)
CHLORIDE SERPL-SCNC: 102 MMOL/L (ref 100–108)
CHLORIDE SERPL-SCNC: 102 MMOL/L (ref 100–108)
CHLORIDE SERPL-SCNC: 103 MMOL/L (ref 100–108)
CHLORIDE SERPL-SCNC: 104 MMOL/L (ref 100–108)
CHLORIDE SERPL-SCNC: 98 MMOL/L (ref 100–108)
CO2 SERPL-SCNC: 16 MMOL/L (ref 21–32)
CO2 SERPL-SCNC: 18 MMOL/L (ref 21–32)
CO2 SERPL-SCNC: 19 MMOL/L (ref 21–32)
CO2 SERPL-SCNC: 19 MMOL/L (ref 21–32)
CO2 SERPL-SCNC: 21 MMOL/L (ref 21–32)
CREAT SERPL-MCNC: 5.87 MG/DL (ref 0.6–1.3)
CREAT SERPL-MCNC: 5.96 MG/DL (ref 0.6–1.3)
CREAT SERPL-MCNC: 6.09 MG/DL (ref 0.6–1.3)
CREAT SERPL-MCNC: 6.18 MG/DL (ref 0.6–1.3)
CREAT SERPL-MCNC: 6.29 MG/DL (ref 0.6–1.3)
CROSSMATCH: NORMAL
CROSSMATCH: NORMAL
EOSINOPHIL # BLD AUTO: 0.02 THOUSAND/ΜL (ref 0–0.61)
EOSINOPHIL NFR BLD AUTO: 0 % (ref 0–6)
ERYTHROCYTE [DISTWIDTH] IN BLOOD BY AUTOMATED COUNT: 14.9 % (ref 11.6–15.1)
GFR SERPL CREATININE-BSD FRML MDRD: 10 ML/MIN/1.73SQ M
GLUCOSE SERPL-MCNC: 103 MG/DL (ref 65–140)
GLUCOSE SERPL-MCNC: 105 MG/DL (ref 65–140)
GLUCOSE SERPL-MCNC: 109 MG/DL (ref 65–140)
GLUCOSE SERPL-MCNC: 119 MG/DL (ref 65–140)
GLUCOSE SERPL-MCNC: 120 MG/DL (ref 65–140)
GLUCOSE SERPL-MCNC: 128 MG/DL (ref 65–140)
GLUCOSE SERPL-MCNC: 129 MG/DL (ref 65–140)
GLUCOSE SERPL-MCNC: 136 MG/DL (ref 65–140)
GLUCOSE SERPL-MCNC: 142 MG/DL (ref 65–140)
GLUCOSE SERPL-MCNC: 146 MG/DL (ref 65–140)
GLUCOSE SERPL-MCNC: 147 MG/DL (ref 65–140)
GLUCOSE SERPL-MCNC: 152 MG/DL (ref 65–140)
GLUCOSE SERPL-MCNC: 163 MG/DL (ref 65–140)
GLUCOSE SERPL-MCNC: 171 MG/DL (ref 65–140)
GLUCOSE SERPL-MCNC: 183 MG/DL (ref 65–140)
GLUCOSE SERPL-MCNC: 190 MG/DL (ref 65–140)
GLUCOSE SERPL-MCNC: 221 MG/DL (ref 65–140)
GLUCOSE SERPL-MCNC: 226 MG/DL (ref 65–140)
GLUCOSE SERPL-MCNC: 246 MG/DL (ref 65–140)
GLUCOSE SERPL-MCNC: 79 MG/DL (ref 65–140)
GLUCOSE SERPL-MCNC: 92 MG/DL (ref 65–140)
GLUCOSE SERPL-MCNC: 94 MG/DL (ref 65–140)
GLUCOSE SERPL-MCNC: 95 MG/DL (ref 65–140)
GLUCOSE SERPL-MCNC: 98 MG/DL (ref 65–140)
HCO3 BLDA-SCNC: 16.2 MMOL/L (ref 22–28)
HCT VFR BLD AUTO: 22.7 % (ref 36.5–49.3)
HGB BLD-MCNC: 7.5 G/DL (ref 12–17)
HGB BLD-MCNC: 7.6 G/DL (ref 12–17)
HGB BLD-MCNC: 7.6 G/DL (ref 12–17)
IMM GRANULOCYTES # BLD AUTO: 0.2 THOUSAND/UL (ref 0–0.2)
IMM GRANULOCYTES NFR BLD AUTO: 2 % (ref 0–2)
INR PPP: 1.37 (ref 0.86–1.17)
LYMPHOCYTES # BLD AUTO: 1.16 THOUSANDS/ΜL (ref 0.6–4.47)
LYMPHOCYTES NFR BLD AUTO: 12 % (ref 14–44)
MAGNESIUM SERPL-MCNC: 1.6 MG/DL (ref 1.6–2.6)
MAGNESIUM SERPL-MCNC: 1.8 MG/DL (ref 1.6–2.6)
MAGNESIUM SERPL-MCNC: 1.8 MG/DL (ref 1.6–2.6)
MAGNESIUM SERPL-MCNC: 1.9 MG/DL (ref 1.6–2.6)
MAGNESIUM SERPL-MCNC: 1.9 MG/DL (ref 1.6–2.6)
MCH RBC QN AUTO: 29.5 PG (ref 26.8–34.3)
MCHC RBC AUTO-ENTMCNC: 33.5 G/DL (ref 31.4–37.4)
MCV RBC AUTO: 88 FL (ref 82–98)
MONOCYTES # BLD AUTO: 1.17 THOUSAND/ΜL (ref 0.17–1.22)
MONOCYTES NFR BLD AUTO: 12 % (ref 4–12)
NEUTROPHILS # BLD AUTO: 6.99 THOUSANDS/ΜL (ref 1.85–7.62)
NEUTS SEG NFR BLD AUTO: 74 % (ref 43–75)
NON VENT ROOM AIR: 21 %
NRBC BLD AUTO-RTO: 0 /100 WBCS
O2 CT BLDA-SCNC: 10.1 ML/DL (ref 16–23)
OXYHGB MFR BLDA: 86.3 % (ref 94–97)
PCO2 BLDA: 29.7 MM HG (ref 36–44)
PH BLDA: 7.35 [PH] (ref 7.35–7.45)
PHOSPHATE SERPL-MCNC: 4.9 MG/DL (ref 2.7–4.5)
PHOSPHATE SERPL-MCNC: 5 MG/DL (ref 2.7–4.5)
PHOSPHATE SERPL-MCNC: 5.8 MG/DL (ref 2.7–4.5)
PLATELET # BLD AUTO: 230 THOUSANDS/UL (ref 149–390)
PMV BLD AUTO: 9 FL (ref 8.9–12.7)
PO2 BLDA: 51.4 MM HG (ref 75–129)
POTASSIUM SERPL-SCNC: 3.2 MMOL/L (ref 3.5–5.3)
POTASSIUM SERPL-SCNC: 3.3 MMOL/L (ref 3.5–5.3)
POTASSIUM SERPL-SCNC: 3.7 MMOL/L (ref 3.5–5.3)
PROCALCITONIN SERPL-MCNC: 18.33 NG/ML
PROTHROMBIN TIME: 17 SECONDS (ref 11.8–14.2)
RBC # BLD AUTO: 2.58 MILLION/UL (ref 3.88–5.62)
SODIUM SERPL-SCNC: 133 MMOL/L (ref 136–145)
SODIUM SERPL-SCNC: 135 MMOL/L (ref 136–145)
SODIUM SERPL-SCNC: 136 MMOL/L (ref 136–145)
SODIUM SERPL-SCNC: 136 MMOL/L (ref 136–145)
SODIUM SERPL-SCNC: 139 MMOL/L (ref 136–145)
SPECIMEN SOURCE: ABNORMAL
TACROLIMUS BLD-MCNC: 13.1 NG/ML (ref 2–20)
UNIT DISPENSE STATUS: NORMAL
UNIT DISPENSE STATUS: NORMAL
UNIT PRODUCT CODE: NORMAL
UNIT PRODUCT CODE: NORMAL
UNIT RH: NORMAL
UNIT RH: NORMAL
VANCOMYCIN SERPL-MCNC: 14.3 UG/ML
WBC # BLD AUTO: 9.56 THOUSAND/UL (ref 4.31–10.16)

## 2018-12-21 PROCEDURE — 87205 SMEAR GRAM STAIN: CPT | Performed by: NURSE PRACTITIONER

## 2018-12-21 PROCEDURE — C9113 INJ PANTOPRAZOLE SODIUM, VIA: HCPCS | Performed by: NURSE PRACTITIONER

## 2018-12-21 PROCEDURE — 82330 ASSAY OF CALCIUM: CPT | Performed by: NURSE PRACTITIONER

## 2018-12-21 PROCEDURE — 85610 PROTHROMBIN TIME: CPT | Performed by: NURSE PRACTITIONER

## 2018-12-21 PROCEDURE — 80048 BASIC METABOLIC PNL TOTAL CA: CPT | Performed by: NURSE PRACTITIONER

## 2018-12-21 PROCEDURE — 71250 CT THORAX DX C-: CPT

## 2018-12-21 PROCEDURE — 87147 CULTURE TYPE IMMUNOLOGIC: CPT | Performed by: NURSE PRACTITIONER

## 2018-12-21 PROCEDURE — 83735 ASSAY OF MAGNESIUM: CPT | Performed by: NURSE PRACTITIONER

## 2018-12-21 PROCEDURE — 99233 SBSQ HOSP IP/OBS HIGH 50: CPT | Performed by: INTERNAL MEDICINE

## 2018-12-21 PROCEDURE — 82948 REAGENT STRIP/BLOOD GLUCOSE: CPT

## 2018-12-21 PROCEDURE — 99232 SBSQ HOSP IP/OBS MODERATE 35: CPT | Performed by: INTERNAL MEDICINE

## 2018-12-21 PROCEDURE — 84100 ASSAY OF PHOSPHORUS: CPT | Performed by: NURSE PRACTITIONER

## 2018-12-21 PROCEDURE — 84145 PROCALCITONIN (PCT): CPT | Performed by: NURSE PRACTITIONER

## 2018-12-21 PROCEDURE — 36600 WITHDRAWAL OF ARTERIAL BLOOD: CPT

## 2018-12-21 PROCEDURE — 80197 ASSAY OF TACROLIMUS: CPT | Performed by: INTERNAL MEDICINE

## 2018-12-21 PROCEDURE — 87070 CULTURE OTHR SPECIMN AEROBIC: CPT | Performed by: NURSE PRACTITIONER

## 2018-12-21 PROCEDURE — 87186 SC STD MICRODIL/AGAR DIL: CPT | Performed by: NURSE PRACTITIONER

## 2018-12-21 PROCEDURE — 85025 COMPLETE CBC W/AUTO DIFF WBC: CPT | Performed by: INTERNAL MEDICINE

## 2018-12-21 PROCEDURE — 73700 CT LOWER EXTREMITY W/O DYE: CPT

## 2018-12-21 PROCEDURE — 82805 BLOOD GASES W/O2 SATURATION: CPT | Performed by: NURSE PRACTITIONER

## 2018-12-21 PROCEDURE — 80202 ASSAY OF VANCOMYCIN: CPT | Performed by: NURSE PRACTITIONER

## 2018-12-21 PROCEDURE — 85018 HEMOGLOBIN: CPT | Performed by: NURSE PRACTITIONER

## 2018-12-21 PROCEDURE — 99253 IP/OBS CNSLTJ NEW/EST LOW 45: CPT | Performed by: PHYSICIAN ASSISTANT

## 2018-12-21 RX ORDER — FENTANYL CITRATE/PF 50 MCG/ML
25 SYRINGE (ML) INJECTION ONCE
Status: COMPLETED | OUTPATIENT
Start: 2018-12-21 | End: 2018-12-21

## 2018-12-21 RX ORDER — POTASSIUM CHLORIDE 14.9 MG/ML
20 INJECTION INTRAVENOUS ONCE
Status: COMPLETED | OUTPATIENT
Start: 2018-12-21 | End: 2018-12-21

## 2018-12-21 RX ORDER — MAGNESIUM SULFATE 1 G/100ML
1 INJECTION INTRAVENOUS ONCE
Status: COMPLETED | OUTPATIENT
Start: 2018-12-21 | End: 2018-12-21

## 2018-12-21 RX ORDER — POTASSIUM CHLORIDE 29.8 MG/ML
40 INJECTION INTRAVENOUS ONCE
Status: COMPLETED | OUTPATIENT
Start: 2018-12-21 | End: 2018-12-21

## 2018-12-21 RX ADMIN — CEFAZOLIN SODIUM 2000 MG: 2 SOLUTION INTRAVENOUS at 17:55

## 2018-12-21 RX ADMIN — DEXTROSE AND SODIUM CHLORIDE 150 ML/HR: 5; .9 INJECTION, SOLUTION INTRAVENOUS at 05:10

## 2018-12-21 RX ADMIN — FENTANYL CITRATE 25 MCG: 50 INJECTION, SOLUTION INTRAMUSCULAR; INTRAVENOUS at 18:58

## 2018-12-21 RX ADMIN — POTASSIUM CHLORIDE 20 MEQ: 200 INJECTION, SOLUTION INTRAVENOUS at 21:00

## 2018-12-21 RX ADMIN — MAGNESIUM SULFATE HEPTAHYDRATE 1 G: 1 INJECTION, SOLUTION INTRAVENOUS at 20:05

## 2018-12-21 RX ADMIN — SODIUM CHLORIDE 8 MG/HR: 9 INJECTION, SOLUTION INTRAVENOUS at 08:05

## 2018-12-21 RX ADMIN — HYDROCORTISONE SODIUM SUCCINATE 50 MG: 100 INJECTION, POWDER, FOR SOLUTION INTRAMUSCULAR; INTRAVENOUS at 08:01

## 2018-12-21 RX ADMIN — SODIUM CHLORIDE 0.3 UNITS/HR: 9 INJECTION, SOLUTION INTRAVENOUS at 19:07

## 2018-12-21 RX ADMIN — VANCOMYCIN HYDROCHLORIDE 1250 MG: 1 INJECTION, POWDER, LYOPHILIZED, FOR SOLUTION INTRAVENOUS at 07:55

## 2018-12-21 RX ADMIN — Medication 125 ML/HR: at 00:38

## 2018-12-21 RX ADMIN — SODIUM CHLORIDE 1 UNITS/HR: 9 INJECTION, SOLUTION INTRAVENOUS at 05:09

## 2018-12-21 RX ADMIN — SODIUM CHLORIDE 8 MG/HR: 9 INJECTION, SOLUTION INTRAVENOUS at 19:08

## 2018-12-21 RX ADMIN — POTASSIUM CHLORIDE 40 MEQ: 400 INJECTION, SOLUTION INTRAVENOUS at 01:28

## 2018-12-21 RX ADMIN — DEXTROSE AND SODIUM CHLORIDE 150 ML/HR: 5; .9 INJECTION, SOLUTION INTRAVENOUS at 12:22

## 2018-12-21 RX ADMIN — DEXTROSE AND SODIUM CHLORIDE 150 ML/HR: 5; .9 INJECTION, SOLUTION INTRAVENOUS at 19:08

## 2018-12-21 RX ADMIN — TACROLIMUS 0.5 MG: 0.5 CAPSULE ORAL at 06:07

## 2018-12-21 RX ADMIN — POTASSIUM CHLORIDE 20 MEQ: 200 INJECTION, SOLUTION INTRAVENOUS at 15:40

## 2018-12-21 RX ADMIN — CALCIUM GLUCONATE 1 G: 98 INJECTION, SOLUTION INTRAVENOUS at 01:37

## 2018-12-21 RX ADMIN — POTASSIUM CHLORIDE 20 MEQ: 200 INJECTION, SOLUTION INTRAVENOUS at 19:32

## 2018-12-21 RX ADMIN — Medication 125 ML/HR: at 09:56

## 2018-12-21 NOTE — UTILIZATION REVIEW
Notification of Discharge  This is a Notification of Discharge from our facility [address]  Please be advised that this patient has been discharge from our facility  Below you will find the admission and discharge date and time including the patients disposition  PRESENTATION DATE: 12/19/2018 11:10 AM  IP ADMISSION DATE: 12/19/18 1110  DISCHARGE DATE: 12/20/2018  3:35 AM  DISPOSITION: [facility] Review Department  Phone: [phone]; Fax [phone]  ATTENTION: Please call with any questions or concerns to [phone]  and carefully listen to the prompts so that you are directed to the right person  Send all requests for admission clinical reviews, approved or denied determinations and any other requests to fax [phone]   All voicemails are confidential

## 2018-12-21 NOTE — PROGRESS NOTES
Progress note - Nephrology   Baldev Kline 51 y o  male MRN: [id number]  Unit/Bed#: ICU 06 Encounter: [id number]      ASSESSMENT & PLAN:    79-year-old male with a past medical history of diabetes mellitus type 1, coronary artery disease status post MI x2 stents and hypertension with end-stage kidney disease status post living related donor transplant last in 2001 with a baseline creatinine of 2 5 presents to [facility] with right ankle pain and weakness found to have DKA and acute anemia complicated by acute kidney injury    1  Acute kidney injury-in the setting of acute anemia and DKA with osteomyelitis  -baseline creatinine has been around 2 5 in May in August 2018 and in September of 2018 creatinine was closer to 3  -follows up at the [address] [place] [address] had a recent orthopedic surgery and he says that after the surgery his creatinine did increase  -now with acute kidney injury on top of his chronic kidney disease in a living related donor  -strict intakes and outputs and daily weights  -renal transplant ultrasound reviewed high resistive indices increased cortical echogenicity  -urinalysis reviewed with a high specific gravity 2+ proteinuria, 2-4 WBCs and 1-2 RBCs  -continue IV fluid resuscitation  -currently no indication for renal replacement therapy  -continue to keep mean arterial pressures greater than 65  -medications reviewed appropriately dosed  -if short of breath initiate Lasix 60 mg IV x1 and monitor response  Has made 1 L of urine since yesterday    2  Stage IIIB to stage 4 chronic kidney disease status post living related donor transplant x2  -baseline creatinine is around 2 5 but most recently was around 3, low muscle mass, creatinine may be over estimated GFR  -also with elevated phosphorus and the 6-9 range which may could indicate a lower GFR as well    3  Azotemia  -BUN  is actually now improving down to 125 from 160 with volume resuscitation improvement and DKA    4  Hyponatremia  -with volume resuscitation serum sodium has improved by 7 mEq which is appropriate  -will monitor with fluid resuscitation actively being done    5  Hyperphosphatemia  -even prior to this hospitalization serum phosphorus was around 6  -given continue to monitor and when acute issues resolve can start a phosphorus binder-although now phosphorus has improved into the 5 range    6  Acute anemia ruling out GI bleed   -endoscopy on home because of being on Plavix  -transfusing packed red blood cells-would use leuko reduced irradiated CMV-negative blood    7  Immunosuppressed the setting of renal transplant  -currently on prednisone 5 mg daily at home on Hydrocortisone in the hospital  -was on azathioprine 50 mg daily which is on hold given current critical illness  -tacrolimus level even higher at 13 and was a true trough no tacrolimus on hold repeat tacrolimus level tomorrow and will need re-dosing of tacrolimus  Was on 1 5 mg q 12 hours    8  Immuno prophylaxis  -currently not on any immuno prophylaxis     9  Anion gap metabolic acidosis secondary to Diabetic ketoacidosis  -improved would discontinue bicarb drip    10  Osteomyelitis  - now on cefazolin which is not seem to have any contraindication with his immune 0 suppression     SUBJECTIVE:    Patient seen today feels much better denies any chest pain or shortness of breath fevers or chills nausea vomiting diarrhea or constipation      MEDICATIONS:    Current Facility-Administered Medications:     acetaminophen (TYLENOL) tablet 650 mg, 650 mg, Oral, Q6H PRN, Mora Luo Spatzer, CRNP    calcitriol (ROCALTROL) capsule 0 5 mcg, 0 5 mcg, Oral, Daily, Mora Luo Spatzer, CRNP, 0 5 mcg at 12/20/18 0908    ceFAZolin (ANCEF) IVPB (premix) 2,000 mg, 2,000 mg, Intravenous, Q24H, Irina Reich MD, Stopped at 12/20/18 1815    dextrose 5 % and sodium chloride 0 9 % infusion, 150 mL/hr, Intravenous, Continuous, GAVINO Flannery, Last Rate: 150 mL/hr at 12/21/18 0510, 150 mL/hr at 12/21/18 0510    hydrocortisone sodium succinate (PF) (Solu-CORTEF) injection 50 mg, 50 mg, Intravenous, Daily, GAVINO Rai, 50 mg at 12/21/18 0801    insulin regular (HumuLIN R,NovoLIN R) 1 Units/mL in sodium chloride 0 9 % 100 mL infusion, 0 1-30 Units/hr, Intravenous, Continuous, Belén Jimenez CRNP, Last Rate: 1 5 mL/hr at 12/21/18 1005, 1 5 Units/hr at 12/21/18 1005    metoclopramide (REGLAN) injection 5 mg, 5 mg, Intravenous, Q6H PRN, Lara Neither Spatzer, CRNP    ondansetron [facility]) injection 4 mg, 4 mg, Intravenous, Q6H PRN, Monie Neither Spatzer, CRNP    pantoprazole (PROTONIX) 80 mg in sodium chloride 0 9 % 100 mL infusion, 8 mg/hr, Intravenous, Continuous, Belén K Santiagoofsamson, CRNP, Last Rate: 10 mL/hr at 12/21/18 0805, 8 mg/hr at 12/21/18 0805    potassium chloride (K-DUR,KLOR-CON) CR tablet 40 mEq, 40 mEq, Oral, Once, GAVINO Rai    sodium bicarbonate 150 mEq in dextrose 5 % 1,000 mL infusion, 125 mL/hr, Intravenous, Continuous, Monie Neither Spatzer, CRNP, Last Rate: 125 mL/hr at 12/21/18 0956, 125 mL/hr at 12/21/18 0956    sodium chloride 0 9 % infusion, 250 mL/hr, Intravenous, Continuous, Belén K Tony, CRNP    vancomycin (VANCOCIN) 1,250 mg in sodium chloride 0 9 % 250 mL IVPB, 1,250 mg, Intravenous, PRN, Adry Canes, DO, Stopped at 12/21/18 6706    REVIEW OF SYSTEMS:  All the systems were reviewed and were negative except as documented on the HPI        PHYSICAL EXAM:  Current Weight: Weight - Scale: 55 5 kg (122 lb 5 7 oz)  First Weight: Weight - Scale: 53 5 kg (117 lb 15 1 oz)  Vitals:    12/21/18 0615 12/21/18 0700 12/21/18 0707 12/21/18 0807   BP: 140/69  115/61 144/68   BP Location: Left arm      Pulse: (!) 110  104 104   Resp: 20  (!) 23 21   Temp:  (!) 100 6 °F (38 1 °C)     TempSrc:  Temporal     SpO2: 98%  97% 98%   Weight:       Height:           Intake/Output Summary (Last 24 hours) at 12/21/18 1048  Last data filed at 12/21/18 1000   Gross per 24 hour   Intake          9799 39 ml   Output             1193 ml   Net          8606 39 ml     General: conscious, cooperative, in not acute distress  Eyes: conjunctivae pink, anicteric sclerae  ENT: lips and mucous membranes moist  Neck: supple, no JVD  Chest: clear breath sounds bilateral, no crackles, ronchus or wheezings  CVS: distinct S1 & S2, normal rate, regular rhythm  Abdomen: soft, non-tender, non-distended, normoactive bowel sounds  Extremities:  With no current edema  Skin: no rash  Neuro: awake, alert, oriented     Lab Results:     Results from last 7 days  Lab Units 12/21/18  0922 12/21/18  0504 12/21/18  0102 12/20/18  2259 12/20/18  2100 12/20/18  1651 12/20/18  1240  12/20/18  0519 12/20/18  0428  12/19/18  2104 12/19/18  1502 12/19/18  0913 12/19/18  0830 12/19/18  0828   WBC Thousand/uL  --  9 56  --   --   --   --   --   --   --  13 53*  --   --   --   --   --  19 49*   HEMOGLOBIN g/dL  --  7 6*  --  8 0*  --  8 8*  --   --  6 1* 6 1*  --   --   --   --   --  8 7*   HEMATOCRIT %  --  22 7*  --   --   --   --   --   --   --  19 4*  --   --   --   --   --  26 2*   PLATELETS Thousands/uL  --  230  --   --   --   --   --   --   --  282  --   --   --   --   --  347   POTASSIUM mmol/L 3 3* 3 7 3 3*  --  3 6 3 6  --   < >  --  3 9  3 9  < > 3 9 3 8  --   --  3 4*   CHLORIDE mmol/L 102 102 98*  --  99* 99*  --   < >  --  96*  96*  < > 96* 94*  --   --  89*   CO2 mmol/L 19* 18* 16*  --  17* 17*  --   < >  --  12*  12*  < > 11* 13*  --   --  13*   BUN mg/dL 125* 132* 137*  --  143* 143*  --   < >  --  162*  162*  < > 140* 125*  --   --  129*   CREATININE mg/dL 6 09* 6 18* 6 29*  --  6 19* 6 37*  --   < >  --  6 71*  6 71*  < > 6 20* 6 12*  --   --  6 69*   CALCIUM mg/dL 7 7* 8 0* 8 1*  --  8 1* 8 0*  --   < >  --  7 9*  7 9*  < > 7 7* 7 6*  --   --  7 6*   MAGNESIUM mg/dL 1 8 1 9 1 9  --  2 0 2 0  --   < >  --  2 2  --   --   --   --   --  2 4   PHOSPHORUS mg/dL 5 0* 4 9* 5 8*  --  5 7* 5 8*  -- < >  --   --   --   --   --   --   --  7 8*   ALK PHOS U/L  --   --   --   --   --   --   --   --   --  157*  --  122 123  --   --  173*   ALT U/L  --   --   --   --   --   --   --   --   --  14  --  9 10  --   --  18   AST U/L  --   --   --   --   --   --   --   --   --  15  --  10* 11*  --   --  14   BLOOD CULTURE   --   --   --   --   --   --   --   --   --   --   --   --   --  Staphylococcus aureus* Staphylococcus aureus*  --    NITRITE UA   --   --   --   --   --   --  Negative  --   --   --   --   --   --   --   --   --    BLOOD UA   --   --   --   --   --   --  Small*  --   --   --   --   --   --   --   --   --    LEUKOCYTES UA   --   --   --   --   --   --  Negative  --   --   --   --   --   --   --   --   --    < > = values in this interval not displayed  Other Studies:  Chest x-ray shows no pneumothorax after central line was placed    Did have a transplant renal ultrasound which showed increased cortical echogenicity suggestive of underlying renal parenchymal disease no hydronephrosis although the external iliac artery and proximal renal artery at the site of anastomosis were not well evaluated the renal artery and vein are patent at the renal hilum there is elevation of the resistant indices basis of underlying renal parenchymal disease

## 2018-12-21 NOTE — PROGRESS NOTES
Patient Name: Lakshmi Lynn  Patient MRN: [id number]  Date: 12/21/18  Service: [facility]    Subjective   having black stools overnight  Hemoglobin reasonably stable  No complaints of abdominal pain  Per patient and wife was on Plavix before admission although seemingly not well documented in chart  Vitals  Blood pressure 144/68, pulse 104, temperature (!) 100 6 °F (38 1 °C), temperature source Temporal, resp  rate 21, height 5' 4" (1 626 m), weight 55 5 kg (122 lb 5 7 oz), SpO2 98 %  HEENT anicteric  Abdomen soft nontender  Neurologic 3 well alert and oriented x3    Laboratory Studies    Results from last 7 days  Lab Units 12/21/18  0504 12/20/18  2259 12/20/18  1651 12/20/18  0519 12/20/18  0428 12/19/18  0828   WBC Thousand/uL 9 56  --   --   --  13 53* 19 49*   HEMOGLOBIN g/dL 7 6* 8 0* 8 8* 6 1* 6 1* 8 7*   HEMATOCRIT % 22 7*  --   --   --  19 4* 26 2*   PLATELETS Thousands/uL 230  --   --   --  282 347   INR   --   --   --   --   --  1 15       Results from last 7 days  Lab Units 12/21/18  0504 12/21/18  0102 12/20/18  2100 12/20/18  1651 12/20/18  1236 12/20/18  0428  12/19/18  2104 12/19/18  1502 12/19/18  0828   POTASSIUM mmol/L 3 7 3 3* 3 6 3 6 3 5 3 9  3 9  < > 3 9 3 8 3 4*   CHLORIDE mmol/L 102 98* 99* 99* 98* 96*  96*  < > 96* 94* 89*   CO2 mmol/L 18* 16* 17* 17* 14* 12*  12*  < > 11* 13* 13*   BUN mg/dL 132* 137* 143* 143* 157* 162*  162*  < > 140* 125* 129*   CREATININE mg/dL 6 18* 6 29* 6 19* 6 37* 6 59* 6 71*  6 71*  < > 6 20* 6 12* 6 69*   CALCIUM mg/dL 8 0* 8 1* 8 1* 8 0* 7 8* 7 9*  7 9*  < > 7 7* 7 6* 7 6*   ALK PHOS U/L  --   --   --   --   --  157*  --  122 123 173*   ALT U/L  --   --   --   --   --  14  --  9 10 18   AST U/L  --   --   --   --   --  15  --  10* 11* 14   < > = values in this interval not displayed      Imaging and Other Studies      Inhouse Medications     Current Facility-Administered Medications:     acetaminophen (TYLENOL) tablet 650 mg, 650 mg, Oral, Q6H PRN    calcitriol (ROCALTROL) capsule 0 5 mcg, 0 5 mcg, Oral, Daily, 0 5 mcg at 12/20/18 0908    ceFAZolin (ANCEF) IVPB (premix) 2,000 mg, 2,000 mg, Intravenous, Q24H, Stopped at 12/20/18 1815    dextrose 5 % and sodium chloride 0 9 % infusion, 150 mL/hr, Intravenous, Continuous, 150 mL/hr at 12/21/18 0510    hydrocortisone sodium succinate (PF) (Solu-CORTEF) injection 50 mg, 50 mg, Intravenous, Daily, 50 mg at 12/21/18 0801    insulin regular (HumuLIN R,NovoLIN R) 1 Units/mL in sodium chloride 0 9 % 100 mL infusion, 0 1-30 Units/hr, Intravenous, Continuous, 1 Units/hr at 12/21/18 0509    metoclopramide (REGLAN) injection 5 mg, 5 mg, Intravenous, Q6H PRN    ondansetron (ZOFRAN) injection 4 mg, 4 mg, Intravenous, Q6H PRN    pantoprazole (PROTONIX) 80 mg in sodium chloride 0 9 % 100 mL infusion, 8 mg/hr, Intravenous, Continuous, 8 mg/hr at 12/21/18 0805    potassium chloride (K-DUR,KLOR-CON) CR tablet 40 mEq, 40 mEq, Oral, Once    sodium bicarbonate 150 mEq in dextrose 5 % 1,000 mL infusion, 125 mL/hr, Intravenous, Continuous, 125 mL/hr at 12/21/18 0038    sodium chloride 0 9 % infusion, 250 mL/hr, Intravenous, Continuous    tacrolimus (PROGRAF) capsule 0 5 mg, 0 5 mg, Oral, Q12H JAMARCUS, 0 5 mg at 12/21/18 0607    vancomycin (VANCOCIN) 1,250 mg in sodium chloride 0 9 % 250 mL IVPB, 1,250 mg, Intravenous, PRN, 1,250 mg at 12/21/18 0755      Assessment/Plan:  1  Probable distal esophageal mass with clot-not biopsied  Depending on whether is transferred her not will need re-endoscopy but likely not until 12/24   This is because of prior Plavix  Discussed with ICU attending  Would consider CT scan of chest   Will sign out to [address] [place] [address] in case he is not transferred    2  Sepsis with positive blood culture Staph aureus  3  Osteomyelitis  4  GI bleed secondary to 1  Transfuse as necessary    There is no active bleeding to prohibit transfer presently          Oscar Carmona MD

## 2018-12-21 NOTE — PROGRESS NOTES
Progress Note - Critical Care   Joycelyn Mojica 51 y o  male MRN: [id number]  Unit/Bed#: ICU 06 Encounter: [id number]    Assessment/Plan:  1  Sepsis secondary to gram-positive bacteremia in the setting of chronic right ankle osteomyelitis  · ID is following  Initial blood cultures preliminarily positive for Staph aureus  Awaiting sensitivities  Continue Ancef and vancomycin per ID  Repeat blood cultures were collected and are pending  · No obvious vegetation on echo  · Has been afebrile  Follow-up times and WBC count  · Chronic prednisone was swith  · Hemodynamics are stable  2  DKA with history of DM type 1  · Improving  Continue insulin and fluids per DKA protocol  Check BMP, Mag, Phos q4hrs  Monitor for gap closure  3  Anion gap metabolic acidosis secondary to #2  · Improving  Continue to trend labs as above  Bicarb improving  Would continue the Bicarb gtt for now  Can likely d/c later today  4  Acute kidney injury on chronic kidney disease stage 3 status post transplant x2  · Nephrology is following  Tacrolimus level is pending  Immunosupressants/Anti-rejection agents per nephrology  · Trend renal indices and monitor strict I&O's  · Pt has been accepted and awaiting transfer to [facility] where he had his transplants done when a bed is available  5  Acute blood loss anemia secondary to upper GI bleed  · Hemoglobin improved from 6 1-8 8 after 2 units packed red blood cells, now down to 7 6 this am   Continue to trend Hgb every 6 hours  · Use leukoreduced, irradiated, and CMV-negative blood products for any transfusion per nephrology recommendations  6  Upper GI bleed status post EGD  · A clot with a mass was identified in the distal 3rd of the esophagus although no blood was found in the stomach  · Continue Protonix drip  Keep NPO for now with plan for repeat EGD  7  Hyponatremia  · Improving    Continue to trend sodium    _____________________________________________________________________    HPI/24hr events:   Afebrile  Multiple dark bowel movements overnight  No acute events    Medications:    Current Facility-Administered Medications:  acetaminophen 650 mg Oral Q6H PRN Cyd Belton Spatzer, CRNP    calcitriol 0 5 mcg Oral Daily Cyd Belton Spatzer, CRNP    cefazolin 2,000 mg Intravenous Q24H Michelle Schmitt MD Last Rate: Stopped (12/20/18 1815)   dextrose 5 % and sodium chloride 0 9 % 150 mL/hr Intravenous Continuous Hilary Olustee GAVINO Jimenez Last Rate: 150 mL/hr (12/21/18 0510)   hydrocortisone sodium succinate 50 mg Intravenous Daily GAVINO Flannery    insulin regular (HumuLIN R,NovoLIN R) infusion 0 1-30 Units/hr Intravenous Continuous GAVINO Cartagena Last Rate: 1 Units/hr (12/21/18 0509)   metoclopramide 5 mg Intravenous Q6H PRN Cyd Belton Spatzer, CRNP    ondansetron 4 mg Intravenous Q6H PRN Cyd Belton Spatzer, CRNP    pantoprozole (PROTONIX) infusion (Continuous) 8 mg/hr Intravenous Continuous GAVINO Cartagena Last Rate: 8 mg/hr (12/20/18 2049)   potassium chloride 40 mEq Oral Once GAVINO Flannery    sodium bicarbonate infusion 125 mL/hr Intravenous Continuous Cyd Belton Spatzer, CRNP Last Rate: 125 mL/hr (12/21/18 0038)   sodium chloride 250 mL/hr Intravenous Continuous GAVINO Flannery    tacrolimus 0 5 mg Oral Q12H [facility] Brien Cali DO    vancomycin 1,250 mg Intravenous PRN Kylah Quigley DO          dextrose 5 % and sodium chloride 0 9 % 150 mL/hr Last Rate: 150 mL/hr (12/21/18 0510)   insulin regular (HumuLIN R,NovoLIN R) infusion 0 1-30 Units/hr Last Rate: 1 Units/hr (12/21/18 3559)   pantoprozole (PROTONIX) infusion (Continuous) 8 mg/hr Last Rate: 8 mg/hr (12/20/18 2049)   sodium bicarbonate infusion 125 mL/hr Last Rate: 125 mL/hr (12/21/18 0038)   sodium chloride 250 mL/hr          Physical exam:  Vitals: Body mass index is 21 kg/m²    Blood pressure 140/69, pulse (!) 110, temperature 98 5 °F (36 9 °C), temperature source Temporal, resp  rate 20, height 5' 4" (1 626 m), weight 55 5 kg (122 lb 5 7 oz), SpO2 98 % ,  Temp  Min: 97 4 °F (36 3 °C)  Max: 99 5 °F (37 5 °C)  IBW: 59 2 kg    SpO2: 98 %  SpO2 Activity: At Rest  O2 Device: None (Room air)      Intake/Output Summary (Last 24 hours) at 12/21/18 0646  Last data filed at 12/21/18 0600   Gross per 24 hour   Intake          8608 64 ml   Output              998 ml   Net          7610 64 ml       Invasive/non-invasive ventilation settings:   Respiratory    Lab Data (Last 4 hours)      12/21 0505            pH, Arterial       7 354           pCO2, Arterial       (!)29 7           pO2, Arterial       (!)51 4           HCO3, Arterial       (!)16 2           Base Excess, Arterial       -8 4                O2/Vent Data     None              Invasive Devices     Central Venous Catheter Line            CVC Central Lines 12/20/18 Double less than 1 day          Peripheral Intravenous Line            Peripheral IV 12/19/18 Right Hand 1 day    Peripheral IV 12/19/18 Right Wrist 1 day    Peripheral IV 12/21/18 Right Antecubital less than 1 day          Drain            Urethral Catheter 16 Fr  less than 1 day                  Physical Exam:  Gen:  Sleeping but easily arousable, appropriate, weak appearing, no acute distress  HEENT:  Atraumatic, normocephalic, extraocular movements intact, pupils 3 mm equal and reactive, oropharynx dry  Neck:  Supple, trachea midline, no JVD, no lymphadenopathy  Chest: Lungs clear  Cor: Single S1/S2, no m/r/g, RRR  Abd: soft, nontender, nondistended, BS normoactive  Ext: RLE 2+ edema, no clubbing or cyanosis  Neuro: oriented x3, CN II-XII grossly intact, no focal deficits  Skin: warm, dry      Diagnostic Data:  Lab: I have personally reviewed pertinent lab results     CBC:     Results from last 7 days  Lab Units 12/21/18  0504 12/20/18  2259 12/20/18  1651  12/20/18  0428 12/19/18  0828   WBC Thousand/uL 9 56  --   --   --  13 53* 19 49*   HEMOGLOBIN g/dL 7 6* 8 0* 8 8*  < > 6 1* 8 7*   HEMATOCRIT % 22 7*  --   --   --  19 4* 26 2*   PLATELETS Thousands/uL 230  --   --   --  282 347   < > = values in this interval not displayed  CMP:     Results from last 7 days  Lab Units 12/21/18  0504 12/21/18  0102 12/20/18  2100  12/20/18  0428  12/19/18  2104 12/19/18  1502   SODIUM mmol/L 135* 133* 133*  < > 129*  129*  < > 125* 124*   POTASSIUM mmol/L 3 7 3 3* 3 6  < > 3 9  3 9  < > 3 9 3 8   CHLORIDE mmol/L 102 98* 99*  < > 96*  96*  < > 96* 94*   CO2 mmol/L 18* 16* 17*  < > 12*  12*  < > 11* 13*   BUN mg/dL 132* 137* 143*  < > 162*  162*  < > 140* 125*   CREATININE mg/dL 6 18* 6 29* 6 19*  < > 6 71*  6 71*  < > 6 20* 6 12*   CALCIUM mg/dL 8 0* 8 1* 8 1*  < > 7 9*  7 9*  < > 7 7* 7 6*   ALK PHOS U/L  --   --   --   --  157*  --  122 123   ALT U/L  --   --   --   --  14  --  9 10   AST U/L  --   --   --   --  15  --  10* 11*   < > = values in this interval not displayed  PT/INR:   No results found for: PT, INR,   Magnesium:     Results from last 7 days  Lab Units 12/21/18  0504 12/21/18  0102 12/20/18  2100   MAGNESIUM mg/dL 1 9 1 9 2 0     Phosphorous:     Results from last 7 days  Lab Units 12/21/18  0504 12/21/18  0102 12/20/18  2100   PHOSPHORUS mg/dL 4 9* 5 8* 5 7*       Microbiology:    Results from last 7 days  Lab Units 12/19/18  0913 12/19/18  0830 12/19/18  0828   BLOOD CULTURE  Staphylococcus aureus*  --   --    GRAM STAIN RESULT  Gram positive cocci in clusters Gram positive cocci in clusters  --    INFLUENZA B PCR   --   --  None Detected   RSV PCR   --   --  None Detected       Imaging:  No new imaging    Cardiac lab/EKG/telemetry/ECHO:   Sinus tachycardia on telemetry    VTE Prophylaxis:  SCDs    Code Status: Level 1 - Full Code    Dwight Splinter Spatzer, CRNP    Portions of the record may have been created with voice recognition software   Occasional wrong word or "sound a like" substitutions may have occurred due to the inherent limitations of voice recognition software  Read the chart carefully and recognize, using context, where substitutions have occurred

## 2018-12-21 NOTE — PROGRESS NOTES
Progress Note - Infectious Disease   Yaya Estevez 51 y o  male MRN: [id number]  Unit/Bed#: ICU 06 Encounter: [id number]      Impression/Plan:  1  Sepsis, POA:  Patient noted on admission to be tachycardic, acidotic and in acute renal failure, with low-grade fevers  This is likely due to problems 2 and 3    -will continue antibiotics and repeat cultures as below  -continue to monitor fever curve/vitals  -additional supportive care as per ICU     2  Staph aureus bacteremia:  Patient's blood cultures noted to be positive for Staph aureus  This is likely due to problem 3 and given his report of recent trauma to the foot  No additional sources noted on exam   Patient's neuro exam remains intact  Transthoracic echo noted with normal valve structures and some pericardial fluid  -repeat blood cultures ordered for tomorrow  -continue cefazolin  -continue vancomycin  -pharmacy consult for vancomycin monitoring  -antibiotics renally dose adjusted  -continue to monitor CBC and chemistry  -will continue to trend fever curve/vitals  -patient will require transesophageal echo if cleared by GI  -would recommend orthopedic evaluation either here or after transfer    -would discuss additional imaging with Orthopedics either CT versus MRI      3  Chronic osteomyelitis of right ankle with hardware involved and suspected abscess:  Patient has had multiple surgeries on his right ankle and based on history has had infection in the hardware  Unclear what organism was isolated in the past and patient reports prior history of requiring 6 weeks of IV vancomycin    On exam suspect abscess development in the area of 2 displaced screws   -continue vancomycin  -continue cefazolin  -will add rifampin once blood cultures have cleared  -continue to monitor CBC and chemistry  -continue to monitor fever curve/vitals  -additional imaging as above  -would recommend orthopedic evaluation either here or after transfer  -patient will likely require prolonged IV antibiotic course and potentially suppressive therapy if hardware cannot be removed      4  Renal transplant on chronic immunosuppression:  Patient has had 2 renal transplants in the past   He remains on immunosuppressive therapy  This further complicates his response to antibiotics and inability to control infection  -will continue antibiotics as above  -immunosuppressive therapy as per Nephrology  -continue to trend fever curve/vitals  -continue to monitor CBC/chemistry  -as above will need to work towards source control     5  Acute on chronic kidney injury:  Patient's kidney function has acutely worsened from his baseline this is likely due to problems 1, 2, 3    -ongoing follow-up by Nephrology  -will renally dose adjust antibiotics as needed  -pharmacy consult for vancomycin monitoring      6  GI bleeding:  Patient noted with acute drop in hemoglobin along with melanotic stools  He is status post EGD was GI and question of esophageal mass   -continued supportive care as per primary  -additional interventions as per GI     7  DKA and type 1 DM:  Patient likely has presentation of DKA due to problems 1, 2, 3  -continue supportive care as per primary  -patient will require tight glucose control in the setting of active infection     Above plan discussed in detail with the patient and primary service      ID consult service will continue to follow  Antibiotics:  Vancomycin/Ancef 2    24 hr events:  Patient continues to be followed by GI  Continues to be followed by Nephrology  Patient's T-max overnight was 100 6  White blood cell count has trended down to 9 5  Blood cultures on 12/19 positive for Staph aureus  Blood cultures from 12/20 noted to be positive  CT chest noted with mural thickening of distal esophagus  Patient continues with intermittent tachycardia    Subjective:  Patient reports having some mild lower back pain  He continues to have ongoing pain in his ankle    He denies any urinary continence or fecal incontinence  He denies any nausea, vomiting, chest pain or shortness of breath  He denies any abdominal pain or diarrhea  He has not developed any rash or intolerance antibiotic  Objective:  Vitals:  Temp:  [98 5 °F (36 9 °C)-100 6 °F (38 1 °C)] 99 4 °F (37 4 °C)  HR:  [100-114] 100  Resp:  [19-37] 21  BP: (115-151)/(61-81) 146/72  SpO2:  [97 %-100 %] 98 %  Temp (24hrs), Av 4 °F (37 4 °C), Min:98 5 °F (36 9 °C), Max:100 6 °F (38 1 °C)  Current: Temperature: 99 4 °F (37 4 °C)    Physical Exam:   General Appearance:  Alert, interactive, nontoxic, no acute distress  Patient continues to take these large pursed lip breaths  Throat: Oropharynx moist without lesions  Lungs:   Clear to auscultation bilaterally; no wheezes, rhonchi or rales; respirations unlabored   Heart:  Tachycardic; no murmur, rub or gallop   Abdomen:   Soft, non-tender, non-distended, positive bowel sounds  No renal transplant graft site tenderness to palpation  Extremities: No clubbing, cyanosis or edema on the left  Patient's right lower extremity remains edematous  The area of hardware displacement on the dorsum of the foot as well as the dorsal aspect of the ankle both noted to be larger and appears from the skin itself that there is likely pus within these outpouchings  Neuro: No cranial nerve deficits noted on exam   Patient has a glass eye on the right  Patient is able to hold up his upper extremities as well his lower extremities against gravity  He has no spinal or paraspinal muscle tenderness to palpation  Perineal sensation is intact  Skin: No other new rashes or lesions  No other draining wounds noted         Labs, Imaging, & Other studies:   All pertinent labs and imaging studies were personally reviewed    Results from last 7 days  Lab Units 18  1143 18  0504 18  2259  18  0428 18  0828   WBC Thousand/uL  --  9 56  --   --  13 53* 19 49* HEMOGLOBIN g/dL 7 5* 7 6* 8 0*  < > 6 1* 8 7*   PLATELETS Thousands/uL  --  230  --   --  282 347   < > = values in this interval not displayed  Results from last 7 days  Lab Units 12/21/18  1143  12/20/18  0428  12/19/18  2104 12/19/18  1502   POTASSIUM mmol/L 3 2*  < > 3 9  3 9  < > 3 9 3 8   CHLORIDE mmol/L 103  < > 96*  96*  < > 96* 94*   CO2 mmol/L 19*  < > 12*  12*  < > 11* 13*   BUN mg/dL 125*  < > 162*  162*  < > 140* 125*   CREATININE mg/dL 5 96*  < > 6 71*  6 71*  < > 6 20* 6 12*   EGFR ml/min/1 73sq m 10  < > 9  9  < > 10 10   CALCIUM mg/dL 7 7*  < > 7 9*  7 9*  < > 7 7* 7 6*   AST U/L  --   --  15  --  10* 11*   ALT U/L  --   --  14  --  9 10   ALK PHOS U/L  --   --  157*  --  122 123   < > = values in this interval not displayed      Results from last 7 days  Lab Units 12/20/18  1720 12/19/18  0913 12/19/18  0830 12/19/18  6955   BLOOD CULTURE   --  Staphylococcus aureus* Staphylococcus aureus*  --    GRAM STAIN RESULT  Gram positive cocci in clusters Gram positive cocci in clusters Gram positive cocci in clusters  --    INFLUENZA B PCR   --   --   --  None Detected   RSV PCR   --   --   --  None Detected

## 2018-12-21 NOTE — PROGRESS NOTES
Trough was 14 3 on 12/21 at 0504  Vanco 1250mg IV prn for random daily trough<20   Next trough 12/22 at 0800 to achieve range of 15-20

## 2018-12-22 LAB
ANION GAP SERPL CALCULATED.3IONS-SCNC: 14 MMOL/L (ref 4–13)
ANION GAP SERPL CALCULATED.3IONS-SCNC: 15 MMOL/L (ref 4–13)
ANION GAP SERPL CALCULATED.3IONS-SCNC: 15 MMOL/L (ref 4–13)
BACTERIA BLD CULT: ABNORMAL
BACTERIA BLD CULT: ABNORMAL
BUN SERPL-MCNC: 102 MG/DL (ref 5–25)
BUN SERPL-MCNC: 110 MG/DL (ref 5–25)
BUN SERPL-MCNC: 96 MG/DL (ref 5–25)
CA-I BLD-SCNC: 1.07 MMOL/L (ref 1.12–1.32)
CA-I BLD-SCNC: 1.08 MMOL/L (ref 1.12–1.32)
CA-I BLD-SCNC: 1.1 MMOL/L (ref 1.12–1.32)
CALCIUM SERPL-MCNC: 8.1 MG/DL (ref 8.3–10.1)
CALCIUM SERPL-MCNC: 8.1 MG/DL (ref 8.3–10.1)
CALCIUM SERPL-MCNC: 8.4 MG/DL (ref 8.3–10.1)
CHLORIDE SERPL-SCNC: 105 MMOL/L (ref 100–108)
CHLORIDE SERPL-SCNC: 106 MMOL/L (ref 100–108)
CHLORIDE SERPL-SCNC: 108 MMOL/L (ref 100–108)
CO2 SERPL-SCNC: 18 MMOL/L (ref 21–32)
CO2 SERPL-SCNC: 19 MMOL/L (ref 21–32)
CO2 SERPL-SCNC: 19 MMOL/L (ref 21–32)
CREAT SERPL-MCNC: 5.58 MG/DL (ref 0.6–1.3)
CREAT SERPL-MCNC: 5.77 MG/DL (ref 0.6–1.3)
CREAT SERPL-MCNC: 5.8 MG/DL (ref 0.6–1.3)
ERYTHROCYTE [DISTWIDTH] IN BLOOD BY AUTOMATED COUNT: 15.2 % (ref 11.6–15.1)
GFR SERPL CREATININE-BSD FRML MDRD: 11 ML/MIN/1.73SQ M
GLUCOSE SERPL-MCNC: 101 MG/DL (ref 65–140)
GLUCOSE SERPL-MCNC: 111 MG/DL (ref 65–140)
GLUCOSE SERPL-MCNC: 112 MG/DL (ref 65–140)
GLUCOSE SERPL-MCNC: 129 MG/DL (ref 65–140)
GLUCOSE SERPL-MCNC: 133 MG/DL (ref 65–140)
GLUCOSE SERPL-MCNC: 163 MG/DL (ref 65–140)
GLUCOSE SERPL-MCNC: 164 MG/DL (ref 65–140)
GLUCOSE SERPL-MCNC: 174 MG/DL (ref 65–140)
GLUCOSE SERPL-MCNC: 175 MG/DL (ref 65–140)
GLUCOSE SERPL-MCNC: 192 MG/DL (ref 65–140)
GLUCOSE SERPL-MCNC: 211 MG/DL (ref 65–140)
GLUCOSE SERPL-MCNC: 238 MG/DL (ref 65–140)
GLUCOSE SERPL-MCNC: 73 MG/DL (ref 65–140)
GLUCOSE SERPL-MCNC: 75 MG/DL (ref 65–140)
GLUCOSE SERPL-MCNC: 97 MG/DL (ref 65–140)
GLUCOSE SERPL-MCNC: 97 MG/DL (ref 65–140)
GRAM STN SPEC: ABNORMAL
GRAM STN SPEC: ABNORMAL
HCT VFR BLD AUTO: 22.7 % (ref 36.5–49.3)
HGB BLD-MCNC: 7.3 G/DL (ref 12–17)
HGB BLD-MCNC: 7.3 G/DL (ref 12–17)
HGB BLD-MCNC: 7.9 G/DL (ref 12–17)
HGB BLD-MCNC: 8.2 G/DL (ref 12–17)
MAGNESIUM SERPL-MCNC: 1.7 MG/DL (ref 1.6–2.6)
MAGNESIUM SERPL-MCNC: 1.9 MG/DL (ref 1.6–2.6)
MAGNESIUM SERPL-MCNC: 1.9 MG/DL (ref 1.6–2.6)
MCH RBC QN AUTO: 28.9 PG (ref 26.8–34.3)
MCHC RBC AUTO-ENTMCNC: 32.2 G/DL (ref 31.4–37.4)
MCV RBC AUTO: 90 FL (ref 82–98)
PHOSPHATE SERPL-MCNC: 4.5 MG/DL (ref 2.7–4.5)
PHOSPHATE SERPL-MCNC: 4.6 MG/DL (ref 2.7–4.5)
PHOSPHATE SERPL-MCNC: 4.7 MG/DL (ref 2.7–4.5)
PLATELET # BLD AUTO: 230 THOUSANDS/UL (ref 149–390)
PMV BLD AUTO: 8.9 FL (ref 8.9–12.7)
POTASSIUM SERPL-SCNC: 3.4 MMOL/L (ref 3.5–5.3)
POTASSIUM SERPL-SCNC: 3.8 MMOL/L (ref 3.5–5.3)
POTASSIUM SERPL-SCNC: 4.2 MMOL/L (ref 3.5–5.3)
PROCALCITONIN SERPL-MCNC: 13.86 NG/ML
RBC # BLD AUTO: 2.53 MILLION/UL (ref 3.88–5.62)
SODIUM SERPL-SCNC: 138 MMOL/L (ref 136–145)
SODIUM SERPL-SCNC: 139 MMOL/L (ref 136–145)
SODIUM SERPL-SCNC: 142 MMOL/L (ref 136–145)
TACROLIMUS BLD-MCNC: 10.3 NG/ML (ref 2–20)
VANCOMYCIN TROUGH SERPL-MCNC: 26.4 UG/ML (ref 10–20)
WBC # BLD AUTO: 10.98 THOUSAND/UL (ref 4.31–10.16)

## 2018-12-22 PROCEDURE — 80197 ASSAY OF TACROLIMUS: CPT | Performed by: INTERNAL MEDICINE

## 2018-12-22 PROCEDURE — 87493 C DIFF AMPLIFIED PROBE: CPT | Performed by: NURSE PRACTITIONER

## 2018-12-22 PROCEDURE — 87147 CULTURE TYPE IMMUNOLOGIC: CPT | Performed by: INTERNAL MEDICINE

## 2018-12-22 PROCEDURE — C9113 INJ PANTOPRAZOLE SODIUM, VIA: HCPCS | Performed by: NURSE PRACTITIONER

## 2018-12-22 PROCEDURE — 99231 SBSQ HOSP IP/OBS SF/LOW 25: CPT | Performed by: ORTHOPAEDIC SURGERY

## 2018-12-22 PROCEDURE — 99232 SBSQ HOSP IP/OBS MODERATE 35: CPT | Performed by: INTERNAL MEDICINE

## 2018-12-22 PROCEDURE — 99233 SBSQ HOSP IP/OBS HIGH 50: CPT | Performed by: INTERNAL MEDICINE

## 2018-12-22 PROCEDURE — 84145 PROCALCITONIN (PCT): CPT | Performed by: NURSE PRACTITIONER

## 2018-12-22 PROCEDURE — 80202 ASSAY OF VANCOMYCIN: CPT | Performed by: INTERNAL MEDICINE

## 2018-12-22 PROCEDURE — 87186 SC STD MICRODIL/AGAR DIL: CPT | Performed by: INTERNAL MEDICINE

## 2018-12-22 PROCEDURE — 85018 HEMOGLOBIN: CPT | Performed by: NURSE PRACTITIONER

## 2018-12-22 PROCEDURE — 83735 ASSAY OF MAGNESIUM: CPT | Performed by: NURSE PRACTITIONER

## 2018-12-22 PROCEDURE — 85027 COMPLETE CBC AUTOMATED: CPT | Performed by: NURSE PRACTITIONER

## 2018-12-22 PROCEDURE — 82948 REAGENT STRIP/BLOOD GLUCOSE: CPT

## 2018-12-22 PROCEDURE — 80048 BASIC METABOLIC PNL TOTAL CA: CPT | Performed by: NURSE PRACTITIONER

## 2018-12-22 PROCEDURE — 84100 ASSAY OF PHOSPHORUS: CPT | Performed by: NURSE PRACTITIONER

## 2018-12-22 PROCEDURE — 87040 BLOOD CULTURE FOR BACTERIA: CPT | Performed by: INTERNAL MEDICINE

## 2018-12-22 PROCEDURE — 82330 ASSAY OF CALCIUM: CPT | Performed by: NURSE PRACTITIONER

## 2018-12-22 RX ORDER — HYDRALAZINE HYDROCHLORIDE 20 MG/ML
5 INJECTION INTRAMUSCULAR; INTRAVENOUS EVERY 6 HOURS PRN
Status: DISCONTINUED | OUTPATIENT
Start: 2018-12-22 | End: 2018-12-23 | Stop reason: HOSPADM

## 2018-12-22 RX ORDER — POTASSIUM CHLORIDE 14.9 MG/ML
20 INJECTION INTRAVENOUS ONCE
Status: COMPLETED | OUTPATIENT
Start: 2018-12-22 | End: 2018-12-22

## 2018-12-22 RX ORDER — CEFAZOLIN SODIUM 2 G/50ML
2000 SOLUTION INTRAVENOUS EVERY 12 HOURS
Status: DISCONTINUED | OUTPATIENT
Start: 2018-12-22 | End: 2018-12-23 | Stop reason: HOSPADM

## 2018-12-22 RX ORDER — PANTOPRAZOLE SODIUM 40 MG/1
40 INJECTION, POWDER, FOR SOLUTION INTRAVENOUS EVERY 12 HOURS SCHEDULED
Status: DISCONTINUED | OUTPATIENT
Start: 2018-12-22 | End: 2018-12-23 | Stop reason: HOSPADM

## 2018-12-22 RX ORDER — DEXTROSE AND SODIUM CHLORIDE 5; .45 G/100ML; G/100ML
50 INJECTION, SOLUTION INTRAVENOUS CONTINUOUS
Status: DISCONTINUED | OUTPATIENT
Start: 2018-12-22 | End: 2018-12-23 | Stop reason: HOSPADM

## 2018-12-22 RX ORDER — NIFEDIPINE 10 MG/1
10 CAPSULE ORAL DAILY
Status: DISCONTINUED | OUTPATIENT
Start: 2018-12-22 | End: 2018-12-23 | Stop reason: HOSPADM

## 2018-12-22 RX ORDER — SODIUM BICARBONATE 650 MG/1
1300 TABLET ORAL
Status: DISCONTINUED | OUTPATIENT
Start: 2018-12-22 | End: 2018-12-23 | Stop reason: HOSPADM

## 2018-12-22 RX ADMIN — POTASSIUM CHLORIDE 20 MEQ: 200 INJECTION, SOLUTION INTRAVENOUS at 07:24

## 2018-12-22 RX ADMIN — PANTOPRAZOLE SODIUM 40 MG: 40 INJECTION, POWDER, FOR SOLUTION INTRAVENOUS at 22:00

## 2018-12-22 RX ADMIN — Medication 125 MG: at 11:35

## 2018-12-22 RX ADMIN — NIFEDIPINE 10 MG: 10 CAPSULE ORAL at 11:35

## 2018-12-22 RX ADMIN — DEXTROSE AND SODIUM CHLORIDE 150 ML/HR: 5; .9 INJECTION, SOLUTION INTRAVENOUS at 03:36

## 2018-12-22 RX ADMIN — POTASSIUM CHLORIDE 20 MEQ: 200 INJECTION, SOLUTION INTRAVENOUS at 09:36

## 2018-12-22 RX ADMIN — Medication 125 MG: at 18:13

## 2018-12-22 RX ADMIN — HYDROCORTISONE SODIUM SUCCINATE 50 MG: 100 INJECTION, POWDER, FOR SOLUTION INTRAMUSCULAR; INTRAVENOUS at 08:30

## 2018-12-22 RX ADMIN — DEXTROSE AND SODIUM CHLORIDE 125 ML/HR: 5; .45 INJECTION, SOLUTION INTRAVENOUS at 07:24

## 2018-12-22 RX ADMIN — SODIUM CHLORIDE 8 MG/HR: 9 INJECTION, SOLUTION INTRAVENOUS at 05:47

## 2018-12-22 RX ADMIN — DEXTROSE AND SODIUM CHLORIDE 125 ML/HR: 5; .45 INJECTION, SOLUTION INTRAVENOUS at 23:54

## 2018-12-22 RX ADMIN — CEFAZOLIN SODIUM 2000 MG: 2 SOLUTION INTRAVENOUS at 14:14

## 2018-12-22 RX ADMIN — SODIUM BICARBONATE 650 MG TABLET 1300 MG: at 20:08

## 2018-12-22 NOTE — PROGRESS NOTES
Progress Note - Critical Care   Carol Corona 51 y o  male MRN: [id number]  Unit/Bed#: ICU 06 Encounter: [id number]    Assessment/Plan:  1  Sepsis secondary to Staph aureus bacteremia in the setting of chronic osteomyelitis with suspected abscess of the right ankle  · Initial blood culture positive for Staphylococcus aureus  Awaiting sensitivities  Two of 2 repeat blood cultures are preliminarily positive for gram-positive cocci in clusters  Wound culture from right ankle also preliminarily positive for 2+ Gram-positive cocci in clusters  Infectious Disease is following  Continue Ancef and vancomycin per their recommendations  Pharmacy is dosing the vancomycin  · Patient has not had any significant fevers  Hemodynamics are stable  · Initial procalcitonin was 35 23 is now trending down  Continue to trend  Follow temps and WBC count  · Will ultimately need orthopedic for surgical intervention for source control  · No obvious vegetation on echo  Would like to get CE however this may be contraindicated with suspected distal esophageal mass  2  Chronic osteomyelitis of right ankle with suspected abscess  · Right lower extremity wound open and is draining purulent fluid  Patient evaluated by Orthopedics  CT of the right lower extremity was done and the official read is pending  · Will likely require transfer to [facility] where previous surgeries were done  If there is delay and transfer could consider transfer to [place] for hardware removal versus below the knee amputation  3  DKA with history of type 1 DM  · Gap remains open  Continue critical care insulin gtt  Will switch IV fluids from D5NS to D51/2NS  Check BMP, Mag, Phos, every 4 hours  · If transfer to [facility] is delayed will consult endocrine given hx of DM Type I   4  Anion gap metabolic acidosis secondary to #2  · Remains off Bicarb gtt  Trend BMP  Acidosis is fairly stable      5  Acute kidney injury on chronic kidney disease stage 3 status post transplant x2 in 1988 and 2001  · Baseline creatinine appears to be 2 5-3  Nephrology is following  Creatinine is slowly improving  Urine output is good  Continue IV fluid hydration  Trend renal indices and monitor intake and output  · Tacrolimus level was high  Repeat level is pending for today  Anti rejection/immuno suppressant therapy per Nephrology  · Pt has been accepted and is awaiting transfer to the [facility] where he had his transplants done when a bed is available  6  Upper GI bleed status post EGD  · Clot with a mass was identified in the distal esophagus without obvious active bleeding  Patient will likely  require repeat EGD  7  Acute blood loss anemia secondary to upper GI bleed  · Hgb remains low but was stable overnight  Continue to trend hemoglobin q 6 hours and transfuse for Hgb>7   · Hemodynamics are stable  8  Suspected distal esophageal mass  · GI following as above  May require biopsy    _____________________________________________________________________    HPI/24hr events:   T-max 100 1°  No acute events overnight      Medications:    Current Facility-Administered Medications:  acetaminophen 650 mg Oral Q6H PRN Maceo Peto Spatzer, CRNP    calcitriol 0 5 mcg Oral Daily Maceo Peto Spatzer, CRNP    cefazolin 2,000 mg Intravenous Q24H Smiley House MD Last Rate: Stopped (12/21/18 1825)   dextrose 5 % and sodium chloride 0 9 % 150 mL/hr Intravenous Continuous Jerone GAVINO Rosa Last Rate: 150 mL/hr (12/21/18 1908)   hydrocortisone sodium succinate 50 mg Intravenous Daily GAVINO Flannery    insulin regular (HumuLIN R,NovoLIN R) infusion 0 3-21 Units/hr Intravenous Titrated GAVINO Penn Last Rate: 5 Units/hr (12/22/18 2165)   metoclopramide 5 mg Intravenous Q6H PRN Maceo Peto Spatzer, CRNP    ondansetron 4 mg Intravenous Q6H PRN Maceo Peto Spatzer, CRNP    pantoprozole (PROTONIX) infusion (Continuous) 8 mg/hr Intravenous Continuous GAVINO Flannery Last Rate: 8 mg/hr (12/21/18 1908)   vancomycin 1,250 mg Intravenous PRN Klaudia Hastings DO Last Rate: Stopped (12/21/18 0955)         dextrose 5 % and sodium chloride 0 9 % 150 mL/hr Last Rate: 150 mL/hr (12/21/18 1908)   insulin regular (HumuLIN R,NovoLIN R) infusion 0 3-21 Units/hr Last Rate: 5 Units/hr (12/22/18 0204)   pantoprozole (PROTONIX) infusion (Continuous) 8 mg/hr Last Rate: 8 mg/hr (12/21/18 1908)         Physical exam:  Vitals: Body mass index is 21 kg/m²  Blood pressure 125/61, pulse 92, temperature 100 1 °F (37 8 °C), resp  rate 21, height 5' 4" (1 626 m), weight 55 5 kg (122 lb 5 7 oz), SpO2 92 %  ,  Temp  Min: 97 4 °F (36 3 °C)  Max: 100 6 °F (38 1 °C)  IBW: 59 2 kg    SpO2: 92 %  SpO2 Activity: At Rest  O2 Device: None (Room air)      Intake/Output Summary (Last 24 hours) at 12/22/18 0207  Last data filed at 12/22/18 0200   Gross per 24 hour   Intake          6274 68 ml   Output             1720 ml   Net          4554 68 ml       Invasive/non-invasive ventilation settings:   Respiratory    Lab Data (Last 4 hours)    None         O2/Vent Data (Last 4 hours)    None              Invasive Devices     Central Venous Catheter Line            CVC Central Lines 12/20/18 Double 1 day          Peripheral Intravenous Line            Peripheral IV 12/19/18 Right Hand 2 days    Peripheral IV 12/19/18 Right Wrist 2 days    Peripheral IV 12/21/18 Right Antecubital less than 1 day          Drain            Urethral Catheter 16 Fr  1 day                  Physical Exam:  Gen:  Sleeping but easily arousable, appropriate, weak appearing, no acute distress  HEENT:  Atraumatic, normocephalic, extraocular movements intact, pupils 3 mm equal and reactive, oropharynx slightly dry  Neck:  Supple, trachea midline, no JVD, no lymphadenopathy  Chest:  Clear to auscultation bilaterally  Cor:  Single S1/S2, no murmurs, rubs, gallops, regular rate and rhythm  Abd:  Soft, nontender, nondistended, bowel sounds normoactive  Ext:  Right lower extremity warm and edematous, prior rate great toe amputation, no clubbing or cyanosis  Neuro:  Oriented x3, cranial nerves 2-12 grossly intact, no focal deficits  Skin:  Warm, dry, posterior open right foot wound draining purulent drainage      Diagnostic Data:  Lab: I have personally reviewed pertinent lab results  CBC:     Results from last 7 days  Lab Units 12/22/18  0019 12/21/18  1754 12/21/18  1143 12/21/18  0504  12/20/18  0428 12/19/18  0828   WBC Thousand/uL  --   --   --  9 56  --  13 53* 19 49*   HEMOGLOBIN g/dL 7 3* 7 6* 7 5* 7 6*  < > 6 1* 8 7*   HEMATOCRIT %  --   --   --  22 7*  --  19 4* 26 2*   PLATELETS Thousands/uL  --   --   --  230  --  282 347   < > = values in this interval not displayed  CMP:     Results from last 7 days  Lab Units 12/21/18  1754 12/21/18  1143 12/21/18  0922  12/20/18  0428  12/19/18  2104 12/19/18  1502   SODIUM mmol/L 139 136 136  < > 129*  129*  < > 125* 124*   POTASSIUM mmol/L 3 3* 3 2* 3 3*  < > 3 9  3 9  < > 3 9 3 8   CHLORIDE mmol/L 104 103 102  < > 96*  96*  < > 96* 94*   CO2 mmol/L 21 19* 19*  < > 12*  12*  < > 11* 13*   BUN mg/dL 115* 125* 125*  < > 162*  162*  < > 140* 125*   CREATININE mg/dL 5 87* 5 96* 6 09*  < > 6 71*  6 71*  < > 6 20* 6 12*   CALCIUM mg/dL 7 5* 7 7* 7 7*  < > 7 9*  7 9*  < > 7 7* 7 6*   ALK PHOS U/L  --   --   --   --  157*  --  122 123   ALT U/L  --   --   --   --  14  --  9 10   AST U/L  --   --   --   --  15  --  10* 11*   < > = values in this interval not displayed    PT/INR:   Lab Results   Component Value Date    INR 1 37 (H) 12/21/2018   ,   Magnesium:   Results from last 7 days  Lab Units 12/21/18  1754 12/21/18  1143 12/21/18  0922   MAGNESIUM mg/dL 1 6 1 8 1 8     Phosphorous:   Results from last 7 days  Lab Units 12/21/18  1754 12/21/18  1143 12/21/18  0922   PHOSPHORUS mg/dL 5 0* 5 0* 5 0*       Microbiology:    Results from last 7 days  Lab Units 12/21/18  1901 12/20/18  1921 12/20/18  1720 12/19/18  9192 12/19/18  0830 12/19/18  0828   BLOOD CULTURE   --   --   --  Staphylococcus aureus* Staphylococcus aureus*  --    GRAM STAIN RESULT  3+ Polys  2+ Gram positive cocci in clusters Gram positive cocci in clusters Gram positive cocci in clusters Gram positive cocci in clusters Gram positive cocci in clusters  --    INFLUENZA B PCR   --   --   --   --   --  None Detected   RSV PCR   --   --   --   --   --  None Detected       Imaging:  CT R Tibia/Fibula - Read pending    CT Chest - Limited noncontrast exam, moderate hiatal hernia  Mural thickening of the distal esophagus  Underlying mass lesion cannot be excluded on this exam     Cardiac lab/EKG/telemetry/ECHO:   Sinus rhythm on telemetry    VTE Prophylaxis: SCDs    Code Status: Level 1 - Full Code    Lara Neither Spatzer, CRNP    Portions of the record may have been created with voice recognition software  Occasional wrong word or "sound a like" substitutions may have occurred due to the inherent limitations of voice recognition software  Read the chart carefully and recognize, using context, where substitutions have occurred

## 2018-12-22 NOTE — PLAN OF CARE
DISCHARGE PLANNING     Discharge to home or other facility with appropriate resources Progressing        GASTROINTESTINAL - ADULT     Maintains adequate nutritional intake Progressing        GENITOURINARY - ADULT     Maintains or returns to baseline urinary function Progressing        HEMATOLOGIC - ADULT     Maintains hematologic stability Progressing        INFECTION - ADULT     Absence or prevention of progression during hospitalization Progressing        Knowledge Deficit     Patient/family/caregiver demonstrates understanding of disease process, treatment plan, medications, and discharge instructions Progressing        METABOLIC, FLUID AND ELECTROLYTES - ADULT     Electrolytes maintained within normal limits Progressing     Fluid balance maintained Progressing     Glucose maintained within target range Progressing        MUSCULOSKELETAL - ADULT     Maintain or return mobility to safest level of function Progressing        Nutrition/Hydration-ADULT     Nutrient/Hydration intake appropriate for improving, restoring or maintaining nutritional needs Progressing        PAIN - ADULT     Verbalizes/displays adequate comfort level or baseline comfort level Progressing        Potential for Falls     Patient will remain free of falls Progressing        Prexisting or High Potential for Compromised Skin Integrity     Skin integrity is maintained or improved Progressing        SKIN/TISSUE INTEGRITY - ADULT     Skin integrity remains intact Progressing     Incision(s), wounds(s) or drain site(s) healing without S/S of infection Progressing     Oral mucous membranes remain intact Progressing

## 2018-12-22 NOTE — PROGRESS NOTES
Vancomycin Assessment    Berto Zamudio is a 51 y o  male who is currently receiving vancomycin 1000mg iv once for bacteremia     Relevant clinical data and objective history reviewed:  Creatinine   Date Value Ref Range Status   12/22/2018 5 80 (H) 0 60 - 1 30 mg/dL Final     Comment:     Standardized to IDMS reference method   12/21/2018 5 87 (H) 0 60 - 1 30 mg/dL Final     Comment:     Standardized to IDMS reference method   12/21/2018 5 96 (H) 0 60 - 1 30 mg/dL Final     Comment:     Standardized to IDMS reference method   11/06/2015 1 26 0 60 - 1 30 mg/dL Final     Comment:     Standardized to IDMS reference method   06/05/2015 1 07 0 60 - 1 30 mg/dL Final     Comment:     Standardized to IDMS reference method   12/14/2014 1 13 0 60 - 1 30 mg/dL Final     Comment:     Standardized to IDMS reference method     Vancomycin Rm   Date Value Ref Range Status   12/21/2018 14 3 ug/mL Final     /76   Pulse 88   Temp 100 3 °F (37 9 °C) (Temporal)   Resp 22   Ht 5' 4" (1 626 m)   Wt 55 5 kg (122 lb 5 7 oz)   SpO2 95%   BMI 21 00 kg/m²   I/O last 3 completed shifts: In: 9764 7 [I V :8814 7; IV Piggyback:950]  Out: 9546 [Urine:2493]  Lab Results   Component Value Date/Time     (H) 12/22/2018 04:40 AM    BUN 31 (H) 11/06/2015 07:34 AM    WBC 10 98 (H) 12/22/2018 04:39 AM    WBC 6 52 11/06/2015 07:34 AM    HGB 7 3 (L) 12/22/2018 04:39 AM    HGB 11 7 (L) 11/06/2015 07:34 AM    HCT 22 7 (L) 12/22/2018 04:39 AM    HCT 35 4 (L) 11/06/2015 07:34 AM    MCV 90 12/22/2018 04:39 AM    MCV 89 11/06/2015 07:34 AM     12/22/2018 04:39 AM     11/06/2015 07:34 AM     Temp Readings from Last 3 Encounters:   12/22/18 100 3 °F (37 9 °C) (Temporal)   12/20/18 98 7 °F (37 1 °C) (Tympanic)   12/19/18 98 5 °F (36 9 °C) (Temporal)     Vancomycin Days of Therapy 3    Assessment/Plan  The patient is currently on vancomycin utilizing pulse dosing    Baseline risks associated with therapy include: pre-existing renal impairment and concomitant nephrotoxic medications  The patient is receiving 1250mg pulse dose with the most recent vancomycin level being not at steady-state and supratherapeutic based on a goal of 15-20 (appropriate for most indications) ; therefore, is not clinically appropriate, so dose will be held  [facility] will continue to follow closely for s/sx of nephrotoxicity, infusion reactions and appropriateness of therapy  BMP and CBC will be ordered per protocol  Plan for trough as patient's trough is currently at 26 4  Recheck trough tomorrow 12/23 at about 8am Pulse dose will be held until further trough assessments  Pharmacy will continue to follow the patients culture results and clinical progress daily      Ba Elliott, Pharmacist

## 2018-12-22 NOTE — PROGRESS NOTES
[address] [facility] NOTE   Joycelyn Mojica 51 y o  male MRN: [id number]  Unit/Bed#: ICU 06 Encounter: [id number]  Reason for Consult:  Acute kidney injury on CKD    ASSESSMENT and PLAN:  Patient is a 41-year-old gentleman with a history of diabetes mellitus type 1, CAD status post MI x2 with stents, end-stage renal disease status post living related donor transplant with a baseline creatinine of 2 5  He presented on 12/20/2018 and was found to have DKA and acute anemia with acute kidney injury  1  Acute kidney injury (POA):   · Pre renal/ +/- ATN due to volume losses/DKA, acute on chronic anemia, sepsis, elevated tacrolimus level  · Renal Transplant ultrasound:  High resistive indices and increased cortical echogenicity  · Status post IV fluid resuscitation, currently receiving IV fluids D5 half-normal saline at 125 mL an hour  · Renal function slowly improving  Creatinine down to 5 8 (peak creatinine 6 69 on admission)  · Good urine output  1775 mL last 24 hr   Patient on room air with no shortness of breath  Lasix 60 mg x1 can be utilized if patient becomes short of breath  · Urinalysis:  Small amount of blood, no RBCs, 1-2 WBCs, innumerable bacteria, 2+ protein  · Chowdary catheter in place for strict I&O  Remove as soon as possible  · Awaiting transfer to [facility] as soon as bed is available  · Continue Q 6 hr lab draws:  BMP, H&H, magnesium, phosphorus, calcium  2  Chronic kidney disease, stage IIIB to 4:    · Status post living related donor transplant x2  Last transplant 2001  Baseline creatinine previously 2 5 but more recently around 3  Low muscle mass-creatinine may overestimate GFR  3  Immunosuppression/ Status post living related donor transplant x2:    · Tacrolimus on hold due to elevated trough of 13  · Awaiting a m  Level  · Previously on 1 5 mg every 12 hr    4  Azotemia:  BUN improving  Level 160 on admission, currently down to 110  5  Hyperphosphatemia:  Resolved    Phosphorus level down to 4 5  6  Acute anemia/upper GI bleed:    · Status post 1 unit of packed cells on 12/20 for hemoglobin of 6 1  · Hemoglobin up to 8 8 post transfusion, drifting down and currently 7 3  · Clot/mass identified in the distal esophagus  7  Hypokalemia:    · Potassium 3 4  Receiving 40 mEq of potassium chloride IV piggyback this morning  · Magnesium level acceptable 1 9  8  Sepsis secondary to Staph aureus bacteremia in the setting of chronic osteomyelitis  · Id following  · Follow-up blood cultures on 12/22 pending  · On Ancef and Vancomycin  · Initial procalcitonin level 35 23   9  Chronic osteomyelitis right ankle:  Abscess suspected  Orthopedics following  10  DKA in the setting of type 1 diabetes mellitus:  Management per Critical Care  11  Anion gap metabolic acidosis in the setting of DKA  · Lactic acid 1 0     · Bicarbonate improved to 21 and bicarbonate infusion was discontinued  · Patient is currently on D5 half-normal saline and bicarbonate has declined to 19  · BMP q 6 hours to trend bicarbonate levels  May need to restart bicarbonate infusion    DISPOSITION:  Patient awaiting transfer to [facility] when bed is available    SUBJECTIVE / INTERVAL HISTORY:  Denies shortness of breath  States he feels "all right"  Family member in attendance  All questions answered  OBJECTIVE:  Current Weight: Weight - Scale: 55 5 kg (122 lb 5 7 oz)  Vitals:    12/22/18 0404 12/22/18 0518 12/22/18 0618 12/22/18 0730   BP:  163/83 154/70 165/74   Pulse:  104 104 (!) 106   Resp:  21 20 21   Temp: 98 3 °F (36 8 °C)      TempSrc:       SpO2:  96% 96% 96%   Weight:       Height:           Intake/Output Summary (Last 24 hours) at 12/22/18 0816  Last data filed at 12/22/18 0800   Gross per 24 hour   Intake          5330 63 ml   Output             1810 ml   Net          3520 63 ml     General:  No acute distress    Quietly lying in bed  Skin:  Warm and dry, no rash  Eyes:  Sclera clear, anicteric  ENT: Oropharynx moist  Neck:  Supple no JVD  Chest:  Clear bilaterally  CVS:  Regular rate and rhythm, no murmur rub or gallop  Abdomen:  Appears slightly distended, firm, bowel sounds present  Extremities:  Mild edema, right foot wrapped  :  Chowdary catheter  Neuro:  Alert and oriented x3  Psych:  Appropriate mood and affect  Medications:    Current Facility-Administered Medications:     acetaminophen (TYLENOL) tablet 650 mg, 650 mg, Oral, Q6H PRN, Jeris Kiang Spatzer, CRNP    calcitriol (ROCALTROL) capsule 0 5 mcg, 0 5 mcg, Oral, Daily, Jeris Kiang Spatzer, CRNP, 0 5 mcg at 12/20/18 0908    ceFAZolin (ANCEF) IVPB (premix) 2,000 mg, 2,000 mg, Intravenous, Q24H, Main Hardy MD, Stopped at 12/21/18 1825    dextrose 5 % and sodium chloride 0 45 % infusion, 125 mL/hr, Intravenous, Continuous, Jeris Kiang Spatzer, CRNP, Last Rate: 125 mL/hr at 12/22/18 0724, 125 mL/hr at 12/22/18 0724    hydrocortisone sodium succinate (PF) (Solu-CORTEF) injection 50 mg, 50 mg, Intravenous, Daily, Arlen GAVINO Arellano, 50 mg at 12/21/18 0801    insulin regular (HumuLIN R,NovoLIN R) 1 Units/mL in sodium chloride 0 9 % 100 mL infusion, 0 3-21 Units/hr, Intravenous, Titrated, GAVINO Murcia, Stopped at 12/22/18 0546    metoclopramide (REGLAN) injection 5 mg, 5 mg, Intravenous, Q6H PRN, Jeris Kiang Spatzer, CRNP    ondansetron [facility]) injection 4 mg, 4 mg, Intravenous, Q6H PRN, Jeris Kiang Spatzer, CRNP    pantoprazole (PROTONIX) 80 mg in sodium chloride 0 9 % 100 mL infusion, 8 mg/hr, Intravenous, Continuous, GAVINO Flannery, Last Rate: 10 mL/hr at 12/22/18 0547, 8 mg/hr at 12/22/18 0547    potassium chloride 20 mEq IVPB (premix), 20 mEq, Intravenous, Once, Jeris Kiang Spatzer, CRNP    potassium chloride 20 mEq IVPB (premix), 20 mEq, Intravenous, Once, Jeris Kiang Spatzer, CRNP, Last Rate: 50 mL/hr at 12/22/18 0724, 20 mEq at 12/22/18 0724    vancomycin (VANCOCIN) 1,250 mg in sodium chloride 0 9 % 250 mL IVPB, 1,250 mg, Intravenous, PRN, Mian Harrell, DO, Stopped at 12/21/18 3056    Laboratory Results:    Results from last 7 days  Lab Units 12/22/18  0440 12/22/18  0439 12/22/18  0019 12/21/18  1754 12/21/18  1143 12/21/18  0922 12/21/18  0504 12/21/18  0102 12/20/18  2259 12/20/18  2100 12/20/18  1651  12/20/18  0428  12/19/18  0828   WBC Thousand/uL  --  10 98*  --   --   --   --  9 56  --   --   --   --   --  13 53*  --  19 49*   HEMOGLOBIN g/dL  --  7 3* 7 3* 7 6* 7 5*  --  7 6*  --  8 0*  --  8 8*  < > 6 1*  --  8 7*   HEMATOCRIT %  --  22 7*  --   --   --   --  22 7*  --   --   --   --   --  19 4*  --  26 2*   PLATELETS Thousands/uL  --  230  --   --   --   --  230  --   --   --   --   --  282  --  347   POTASSIUM mmol/L 3 4*  --   --  3 3* 3 2* 3 3* 3 7 3 3*  --  3 6 3 6  < > 3 9  3 9  < > 3 4*   CHLORIDE mmol/L 108  --   --  104 103 102 102 98*  --  99* 99*  < > 96*  96*  < > 89*   CO2 mmol/L 19*  --   --  21 19* 19* 18* 16*  --  17* 17*  < > 12*  12*  < > 13*   BUN mg/dL 110*  --   --  115* 125* 125* 132* 137*  --  143* 143*  < > 162*  162*  < > 129*   CREATININE mg/dL 5 80*  --   --  5 87* 5 96* 6 09* 6 18* 6 29*  --  6 19* 6 37*  < > 6 71*  6 71*  < > 6 69*   CALCIUM mg/dL 8 1*  --   --  7 5* 7 7* 7 7* 8 0* 8 1*  --  8 1* 8 0*  < > 7 9*  7 9*  < > 7 6*   MAGNESIUM mg/dL 1 9  --   --  1 6 1 8 1 8 1 9 1 9  --  2 0 2 0  < > 2 2  --  2 4   PHOSPHORUS mg/dL 4 5  --   --  5 0* 5 0* 5 0* 4 9* 5 8*  --  5 7* 5 8*  < >  --   --  7 8*   < > = values in this interval not displayed    Work up:

## 2018-12-22 NOTE — CONSULTS
Patient already known to GI  Discussed with ICU NP  Eventual re-biopsy of possible GE junction mass as seen on EGD and CT scan imaging after to assess stage however given his CKD would hold off on further imaging currently  As long as hemoglobin remains stable will hold off on repeat EGD for now given his pending transfer to [facility] given his osteomyelitis and infectious process currently is the main issue  This will need to be worked up though and if remains here we can consider EGD on Monday

## 2018-12-22 NOTE — CONSULTS
Orthopaedic Surgery - Consultation  Katty Lopez (82 y o  male)   : 1970   MRN: [id number]  Date: 2018   Encounter: [id number]   Unit/Bed#: ICU 06    Consulting Physician:  Stephanie Moreno PA-C  Requesting Physician: Binu Mccauley DO  Reason for Consult / Principal Problem: Infection L ankle with retained hardware    Assessment / Plan  Infection L ankle with retained hardware and sepsis    · Due to extent of previous ankle surgeries would recommend transfer to [facility] where he had his previous surgery done  If transfer not available consider transfer to [place] for hardware removal verses consideration of BTK amputation by general surgery  · CT scan pending at this time  · Continue Ice and analgesics as needed  · Antibiotics per ID  · Will continue to follow at this time    History of Present Illness  Katty Lopez is a 51 y o  male who presents secondary to L ankle pain and swelling after being transferred from [address] [place] [address] with sepsis and renal failure  Since being transferred he has had increasing pain and today purulent drainage from a wound on the posterior aspect of the foot  He has history of multiple ankle surgeries and infection in the past with the last being a ankle fusion using a retrograde nail and screws in 2018 at [address] [facility] with Dr Lakeisha Tesfaye  He was doing well with this up to this time  He has a history of Kidney transplant also done a [facility] where there is transfer pending for him      Review of Systems  Refer to H&P    Medical, Surgical, Family, and Social History    Past Medical History:   Diagnosis Date    Bacteremia 2018    [facility]     Diabetes mellitus ([facility]     Diabetes mellitus type 1 ([facility]     Hypertension     Infection at site of external fixator pin ([facility]     MI (myocardial infarction) ([facility]     Pneumonia     Last Assessed [id number]    Renal failure     Renal transplant, status post 2007 Past Surgical History:   Procedure Laterality Date    ANKLE FRACTURE SURGERY      ANKLE HARDWARE REMOVAL Right 7/31/2017    Procedure: REMOVAL HARDWARE ANKLE;  Surgeon: Amadeo Torres MD;  Location: [facility];  Service: Orthopedics    ANKLE HARDWARE REMOVAL Right 8/17/2017    Procedure: TIBIA FAILED HARDWARE REMOVAL;  Surgeon: Amadeo Torres MD;  [facility]: [facility];  Service: Orthopedics    CARDIAC SURGERY      2 stents    CLOSED REDUCTION ANKLE Right 7/3/2017    Procedure: CLOSED REDUCTION DISTAL TIB-FIB AND CASTING VS;  Surgeon: Amadeo Torres MD;  Location: [facility];  Service: Orthopedics    ESOPHAGOGASTRODUODENOSCOPY N/A 12/20/2018    Procedure: ESOPHAGOGASTRODUODENOSCOPY (EGD) in ICU; Surgeon: Zoltan Marin MD;  Location: [facility]; Service: Gastroenterology    EYE SURGERY      FRACTURE SURGERY      ORIF Rt Ankle    GLUTAMIC ACID DECARBOXYLASE (HISTORICAL)      IR PICC LINE  8/20/2018    IR VENOUS LINE REMOVAL  10/5/2018    NEPHRECTOMY TRANSPLANTED ORGAN      IL OPEN TREATMENT FRACTURE DISTAL TIBIA FIBULA Right 7/3/2017    Procedure: OPEN REDUCTION W/ INTERNAL FIXATION (ORIF); Surgeon: Amadeo Torres MD;  Location: [facility];  Service: Orthopedics    TOE AMPUTATION Right 10/27/2016    Procedure: AMPUTATION TOE;  Surgeon: Pauline Olivier DPM;  Location: [facility];  Service:        Family History   Problem Relation Age of Onset    Diabetes Brother     Coronary artery disease Mother        Social History     Occupational History    Not on file       Social History Main Topics    Smoking status: Never Smoker    Smokeless tobacco: Never Used    Alcohol use No    Drug use: No    Sexual activity: Yes       No Known Allergies    Current Facility-Administered Medications   Medication Dose Route Frequency    acetaminophen (TYLENOL) tablet 650 mg  650 mg Oral Q6H PRN    calcitriol (ROCALTROL) capsule 0 5 mcg  0 5 mcg Oral Daily    ceFAZolin (ANCEF) IVPB (premix) 2,000 mg  2,000 mg Intravenous Q24H    dextrose 5 % and sodium chloride 0 9 % infusion  150 mL/hr Intravenous Continuous    hydrocortisone sodium succinate (PF) (Solu-CORTEF) injection 50 mg  50 mg Intravenous Daily    insulin regular (HumuLIN R,NovoLIN R) 1 Units/mL in sodium chloride 0 9 % 100 mL infusion  0 3-21 Units/hr Intravenous Titrated    metoclopramide (REGLAN) injection 5 mg  5 mg Intravenous Q6H PRN    ondansetron (ZOFRAN) injection 4 mg  4 mg Intravenous Q6H PRN    pantoprazole (PROTONIX) 80 mg in sodium chloride 0 9 % 100 mL infusion  8 mg/hr Intravenous Continuous    potassium chloride 20 mEq IVPB (premix)  20 mEq Intravenous Once    vancomycin (VANCOCIN) 1,250 mg in sodium chloride 0 9 % 250 mL IVPB  1,250 mg Intravenous PRN     Prescriptions Prior to Admission   Medication    NIFEdipine (PROCARDIA) 10 mg capsule    aspirin (ECOTRIN LOW STRENGTH) 81 mg EC tablet    atorvastatin (LIPITOR) 40 mg tablet    calcitriol (ROCALTROL) 0 5 MCG capsule    cefepime (MAXIPIME) 1000 mg IVPB    doxycycline hyclate (VIBRAMYCIN) 100 mg capsule    Heparin Sodium, Porcine, (HEPARIN, PORCINE,) 5,000 units/mL    insulin detemir (LEVEMIR) 100 units/mL subcutaneous injection    insulin lispro (HUMALOG) 100 units/mL injection    Lactobacillus (ACIDOPHILUS PO)    predniSONE 5 mg tablet    sodium bicarbonate 650 mg tablet    sodium chloride       Vitals  Temp:  [98 5 °F (36 9 °C)-100 6 °F (38 1 °C)] 99 8 °F (37 7 °C)  HR:  [] 108  Resp:  [20-37] 20  BP: (115-159)/(61-78) 159/76  Body mass index is 21 kg/m²  I/O last 24 hours:   In: 7938 2 [I V :7088 2; IV Piggyback:850]  Out: 2624 [Urine:1658]    Physical Exam  General:  Alert & oriented x3, no distress, appears stated age  [de-identified]:  EOMI, eyes clear, hearing intact, mucous membranes moist  Neck:  Supple, non-tender, trachea midline, no lymphadenopathy  Cardiovascular:  Regular rate, no discernable arrhythmia  Pulmonary:  Regular rate, respirations non-labored Abdominal:  Soft, non-tender    Ortho Exam - Left Lower Extremity  Left Foot & Ankle Exam  Alignment:  Normal ankle alignment  Inspection:  Moderate foot and ankle swelling  moderate erythema  There is a open wound on the posterior aspect of the foot with active purulent drainage at this time  There is also a calus on the bottom of the foot with swelling and appearance of fluid collection underneath  Palpation:  moderate  tenderness at the foot and ankle posteriorly generally  ROM:  No ankle ROM due to fusion  Strength:  Not tested  Stability:  Not tested  Tests:  No pertinent positive or negative tests  Neurovascular:  Sensation intact in DP/SP/Dunn/Sa/T nerve distributions  Brisk capillary refill in all toes  · Gait:  Not tested      Imaging  No studies to review    Lab Results  (I have personally reviewed pertinent lab results )    Results from last 7 days  Lab Units 12/21/18  1754 12/21/18  1143 12/21/18  0504 12/20/18  2259 12/20/18  1651 12/20/18  0519 12/20/18  0428 12/19/18  0828   WBC Thousand/uL  --   --  9 56  --   --   --  13 53* 19 49*   HEMOGLOBIN g/dL 7 6* 7 5* 7 6* 8 0* 8 8* 6 1* 6 1* 8 7*   HEMATOCRIT %  --   --  22 7*  --   --   --  19 4* 26 2*   PLATELETS Thousands/uL  --   --  230  --   --   --  282 347   RBC Million/uL  --   --  2 58*  --   --   --  2 07* 2 95*   MCV fL  --   --  88  --   --   --  94 89   MCH pg  --   --  29 5  --   --   --  29 5 29 5   MCHC g/dL  --   --  33 5  --   --   --  31 4 33 2   RDW %  --   --  14 9  --   --   --  14 7 14 6   MPV fL  --   --  9 0  --   --   --  8 8* 8 8*   NRBC AUTO /100 WBCs  --   --  0  --   --   --  0 0       Results from last 7 days  Lab Units 12/21/18  0922 12/19/18  0828   PTT seconds  --  49*   INR  1 37* 1 15       Results from last 7 days  Lab Units 12/21/18  1754 12/21/18  1143 12/21/18  6985 12/21/18  0504 12/21/18  0102 12/20/18  2100 12/20/18  1651 12/20/18  1236 12/20/18  0428 12/20/18  0104 12/19/18  2104 12/19/18  1502 12/19/18  0828   POTASSIUM mmol/L 3 3* 3 2* 3 3* 3 7 3 3* 3 6 3 6 3 5 3 9  3 9 3 5 3 9 3 8 3 4*   CHLORIDE mmol/L 104 103 102 102 98* 99* 99* 98* 96*  96* 98 96* 94* 89*   CO2 mmol/L 21 19* 19* 18* 16* 17* 17* 14* 12*  12* 11* 11* 13* 13*   BUN mg/dL 115* 125* 125* 132* 137* 143* 143* 157* 162*  162* 144* 140* 125* 129*   CREATININE mg/dL 5 87* 5 96* 6 09* 6 18* 6 29* 6 19* 6 37* 6 59* 6 71*  6 71* 6 26* 6 20* 6 12* 6 69*   EGFR ml/min/1 73sq m 10 10 10 10 10 10 9 9 9  9 10 10 10 9   CALCIUM mg/dL 7 5* 7 7* 7 7* 8 0* 8 1* 8 1* 8 0* 7 8* 7 9*  7 9* 7 8* 7 7* 7 6* 7 6*   PHOSPHORUS mg/dL 5 0* 5 0* 5 0* 4 9* 5 8* 5 7* 5 8* 5 7*  --   --   --   --  7 8*   ALT U/L  --   --   --   --   --   --   --   --  14  --  9 10 18   AST U/L  --   --   --   --   --   --   --   --  15  --  10* 11* 14           Results from last 7 days  Lab Units 12/20/18  1921 12/20/18  1720 12/19/18  0913 12/19/18  0830   BLOOD CULTURE   --   --  Staphylococcus aureus* Staphylococcus aureus*   GRAM STAIN RESULT  Gram positive cocci in clusters Gram positive cocci in clusters Gram positive cocci in clusters Gram positive cocci in clusters       Code Status  Level 1 - Full Code    Counseling / Coordination of Care  Total floor / unit time spent today 20 minutes  Greater than 50% of total time was spent with the patient and / or family counseling and / or coordination of care      Fabi Lombardi PA-C

## 2018-12-22 NOTE — CONSULTS
The patient has MSSA bacteremia, and ID has d/c'd vancomycin  Pharmacy will be signing off now  Thank you for consulting pharmacy for Vancomycin dosing

## 2018-12-22 NOTE — PROGRESS NOTES
Progress Note - Infectious Disease   Uriah Almendarez 51 y o  male MRN: [id number]  Unit/Bed#: ICU 06 Encounter: [id number]      Impression/Plan:  1  Sepsis, POA:  Patient noted on admission to be tachycardic, acidotic and in acute renal failure, with low-grade fevers   This is likely due to problems 2 and 3    -will continue antibiotics and repeat cultures as below  -continue to monitor fever curve/vitals  -additional supportive care as per ICU     2  MSSA bacteremia:  Patient's blood cultures noted to be positive for Staph aureus   This is likely due to problem 3 and given his report of recent trauma to the foot   No additional sources noted on exam   Patient's neuro exam remains intact  Transthoracic echo noted with normal valve structures and some pericardial fluid  -repeat blood cultures pending  -continue cefazolin  -stop vancomycin  -antibiotics renally dose adjusted  -continue to monitor CBC and chemistry  -will continue to trend fever curve/vitals  -patient will require transesophageal echo if cleared by GI  -orthopedic evaluation appreciated  -patient is unable to be transferred to [facility] then would consider transfer to [place] for orthopedic intervention to obtain source control      3  Chronic osteomyelitis of right ankle with hardware involved and abscess:  Patient has had multiple surgeries on his right ankle and based on history has had infection in the hardware   Unclear what organism was isolated in the past and patient reports prior history of requiring 6 weeks of IV vancomycin     Patient's wounds have started draining on exam   Wound cultures pending   -continue cefazolin  -consider rifampin after blood cultures clear, though significant drug drug interactions with patient's immunosuppression   -continue to monitor CBC and chemistry  -continue to monitor fever curve/vitals  -orthopedic evaluation appreciated  -the patient is unable to be transferred to [facility] that would agree with transfer to [place] for surgical intervention to obtain source control   -patient's antibiotic duration will be dependent on surgical intervention      4  Renal transplant on chronic immunosuppression:  Patient has had 2 renal transplants in the past  Keyur Shell remains on immunosuppressive therapy   This further complicates his response to antibiotics and inability to control infection  -will continue antibiotics as above  -immunosuppressive therapy as per Nephrology  -continue to trend fever curve/vitals  -continue to monitor CBC/chemistry  -as above will need to work towards source control     5  Acute on chronic kidney injury:  Patient's kidney function has acutely worsened from his baseline this is likely due to problems 1, 2, 3    -ongoing follow-up by Nephrology  -will renally dose adjust antibiotics as needed     6  GI bleeding and esophageal mass:  Patient noted with acute drop in hemoglobin along with melanotic stools   He is status post EGD was GI and question of esophageal mass   -continued supportive care as per primary  -additional interventions as per GI  -esophageal mass likely to prohibit possibility of transesophageal echo      7  DKA and type 1 DM:  Patient likely has presentation of DKA due to problems 1, 2, 3  -continue supportive care as per primary  -patient will require tight glucose control in the setting of active infection     Above plan discussed in detail with the patient      ID consult service will continue to follow  Antibiotics:  Vancomycin/Ancef 3     24 hr events:  Patient seen by Orthopedics yesterday  T-max of a 100 3°  White blood cell count 10 9  Blood culture still with Staph aureus on 12/19 12/20 2 of 2 cultures positive  Wound culture with gram-positive cocci in clusters 12/21  Blood cultures today pending  CT noted with large fluid collection on the plantar aspect of the patient's foot    Patient's other vitals are stable    Subjective:  Patient denies having any nausea, vomiting, chest pain or shortness of breath  He is currently tolerating antibiotic without significant issues  He denies having any rash or diarrhea  He denies having any new joint pains or new focal weaknesses on exam and denies having any progressive or new back pain  He continues to have pain in his right ankle and as per nursing it started to drain spontaneously yesterday which was the source of the cultures  Objective:  Vitals:  Temp:  [98 3 °F (36 8 °C)-100 3 °F (37 9 °C)] 100 3 °F (37 9 °C)  HR:  [] 86  Resp:  [18-24] 23  BP: (125-175)/(61-83) 162/75  SpO2:  [92 %-99 %] 95 %  Temp (24hrs), Av 6 °F (37 6 °C), Min:98 3 °F (36 8 °C), Max:100 3 °F (37 9 °C)  Current: Temperature: 100 3 °F (37 9 °C)    Physical Exam:   General Appearance:  Alert, interactive, nontoxic, no acute distress  Throat: Oropharynx moist without lesions  Lungs:   Clear to auscultation bilaterally; no wheezes, rhonchi or rales; respirations unlabored   Heart:  Tachycardia; no murmur, rub or gallop   Abdomen:   Soft, non-tender, non-distended, positive bowel sounds  No renal graft site tenderness to palpation    Extremities: No clubbing, cyanosis or edema in the left lower extremity  Right ankle noted to be wrapped with drainage coming from the posterior ankle site  The dorsal site continues to show changes consistent with purulent collection developed  The ankle itself is swollen and tender to palpation, there is no erythema or induration   Skin: No other rashes or lesions  No other draining wounds noted  Central line site appears intact         Labs, Imaging, & Other studies:   All pertinent labs and imaging studies were personally reviewed    Results from last 7 days  Lab Units 18  0439 18  0019 18  1754  18  0504  18  0428   WBC Thousand/uL 10 98*  --   --   --  9 56  --  13 53*   HEMOGLOBIN g/dL 7 3* 7 3* 7 6*  < > 7 6*  < > 6 1*   PLATELETS Thousands/uL 230  --   --   --  230  --  282   < > = values in this interval not displayed  Results from last 7 days  Lab Units 12/22/18  0440  12/20/18  0428  12/19/18  2104 12/19/18  1502   POTASSIUM mmol/L 3 4*  < > 3 9  3 9  < > 3 9 3 8   CHLORIDE mmol/L 108  < > 96*  96*  < > 96* 94*   CO2 mmol/L 19*  < > 12*  12*  < > 11* 13*   BUN mg/dL 110*  < > 162*  162*  < > 140* 125*   CREATININE mg/dL 5 80*  < > 6 71*  6 71*  < > 6 20* 6 12*   EGFR ml/min/1 73sq m 11  < > 9  9  < > 10 10   CALCIUM mg/dL 8 1*  < > 7 9*  7 9*  < > 7 7* 7 6*   AST U/L  --   --  15  --  10* 11*   ALT U/L  --   --  14  --  9 10   ALK PHOS U/L  --   --  157*  --  122 123   < > = values in this interval not displayed      Results from last 7 days  Lab Units 12/21/18  1901 12/20/18  1921 12/20/18  1720 12/19/18  0913 12/19/18  0830 12/19/18  7233   BLOOD CULTURE   --   --   --  Staphylococcus aureus* Staphylococcus aureus*  --    GRAM STAIN RESULT  3+ Polys  2+ Gram positive cocci in clusters Gram positive cocci in clusters Gram positive cocci in clusters Gram positive cocci in clusters Gram positive cocci in clusters  --    INFLUENZA B PCR   --   --   --   --   --  None Detected   RSV PCR   --   --   --   --   --  None Detected

## 2018-12-22 NOTE — PROGRESS NOTES
Orthopaedic Surgery - Progress Note  Abelardo Cast (56 y o  male)   : 1970   MRN: [id number]  Date: 2018   Encounter: [id number]   Unit/Bed#: ICU 06    Assessment / Plan  Left ankle infection with retained hardware and sepsis    · Awaiting transfer to [facility] where his previous ankle surgeries were performed  · Antibiotics per primary team  · Will follow while inpatient here    Subjective  Reports moderate to severe left ankle and foot pain, slightly improved on antibiotics  Vitals  Temp:  [98 3 °F (36 8 °C)-100 3 °F (37 9 °C)] 100 3 °F (37 9 °C)  HR:  [] 106  Resp:  [18-24] 21  BP: (125-175)/(61-83) 165/74  Body mass index is 21 kg/m²  I/O last 24 hours:   In: 5903 3 [I V :5203 3; IV Piggyback:700]  Out: 1930 [Urine:1930]    Ortho Exam - Left Lower Extremity  · Ankle and foot with moderate swelling and diffuse tenderness  · Ulcerations on plantar aspect of heel and posterior heel with mild purulent drainage  · Diminished sensation in BLE c/w baseline  · Able to wiggle toes slightly on LLE  · Foot warm, diminished DP pulse    Lab Results  (I have personally reviewed pertinent lab results )    Results from last 7 days  Lab Units 18  0439 18  0019 18  1754 18  1143 18  0504 18  2259 18  1651 18  0519 18  0428 18  0828   WBC Thousand/uL 10 98*  --   --   --  9 56  --   --   --  13 53* 19 49*   HEMOGLOBIN g/dL 7 3* 7 3* 7 6* 7 5* 7 6* 8 0* 8 8* 6 1* 6 1* 8 7*   HEMATOCRIT % 22 7*  --   --   --  22 7*  --   --   --  19 4* 26 2*   PLATELETS Thousands/uL 230  --   --   --  230  --   --   --  282 347       Results from last 7 days  Lab Units 18  0922 18  0828   PTT seconds  --  49*   INR  1 37* 1 15       Results from last 7 days  Lab Units 18  0440 18  1754 18  1143 18  8297 18  0504 18  0102 18  2100 18  1651 18  1236 18  0428 18  0104 18  2104 12/19/18  1502 12/19/18  0828   POTASSIUM mmol/L 3 4* 3 3* 3 2* 3 3* 3 7 3 3* 3 6 3 6 3 5 3 9  3 9 3 5 3 9 3 8 3 4*   CHLORIDE mmol/L 108 104 103 102 102 98* 99* 99* 98* 96*  96* 98 96* 94* 89*   CO2 mmol/L 19* 21 19* 19* 18* 16* 17* 17* 14* 12*  12* 11* 11* 13* 13*   BUN mg/dL 110* 115* 125* 125* 132* 137* 143* 143* 157* 162*  162* 144* 140* 125* 129*   CREATININE mg/dL 5 80* 5 87* 5 96* 6 09* 6 18* 6 29* 6 19* 6 37* 6 59* 6 71*  6 71* 6 26* 6 20* 6 12* 6 69*   EGFR ml/min/1 73sq m 11 10 10 10 10 10 10 9 9 9  9 10 10 10 9   CALCIUM mg/dL 8 1* 7 5* 7 7* 7 7* 8 0* 8 1* 8 1* 8 0* 7 8* 7 9*  7 9* 7 8* 7 7* 7 6* 7 6*   PHOSPHORUS mg/dL 4 5 5 0* 5 0* 5 0* 4 9* 5 8* 5 7* 5 8* 5 7*  --   --   --   --  7 8*   ALK PHOS U/L  --   --   --   --   --   --   --   --   --  157*  --  122 123 173*   ALT U/L  --   --   --   --   --   --   --   --   --  14  --  9 10 18   AST U/L  --   --   --   --   --   --   --   --   --  15  --  10* 11* 14           Results from last 7 days  Lab Units 12/21/18  1901 12/20/18  1921 12/20/18  1720 12/19/18  0913 12/19/18  0830   BLOOD CULTURE   --   --   --  Staphylococcus aureus* Staphylococcus aureus*   GRAM STAIN RESULT  3+ Polys  2+ Gram positive cocci in clusters Gram positive cocci in clusters Gram positive cocci in clusters Gram positive cocci in clusters Gram positive cocci in clusters       Elaina Bravo MD

## 2018-12-23 VITALS
OXYGEN SATURATION: 98 % | HEART RATE: 100 BPM | RESPIRATION RATE: 21 BRPM | TEMPERATURE: 98.3 F | HEIGHT: 64 IN | SYSTOLIC BLOOD PRESSURE: 167 MMHG | WEIGHT: 122.36 LBS | DIASTOLIC BLOOD PRESSURE: 80 MMHG | BODY MASS INDEX: 20.89 KG/M2

## 2018-12-23 LAB
ANION GAP SERPL CALCULATED.3IONS-SCNC: 14 MMOL/L (ref 4–13)
ANION GAP SERPL CALCULATED.3IONS-SCNC: 16 MMOL/L (ref 4–13)
BACTERIA BLD CULT: ABNORMAL
BACTERIA BLD CULT: ABNORMAL
BUN SERPL-MCNC: 101 MG/DL (ref 5–25)
BUN SERPL-MCNC: 94 MG/DL (ref 5–25)
C DIFF TOX GENS STL QL NAA+PROBE: NORMAL
CA-I BLD-SCNC: 1.08 MMOL/L (ref 1.12–1.32)
CA-I BLD-SCNC: 1.1 MMOL/L (ref 1.12–1.32)
CALCIUM SERPL-MCNC: 8.2 MG/DL (ref 8.3–10.1)
CALCIUM SERPL-MCNC: 8.2 MG/DL (ref 8.3–10.1)
CHLORIDE SERPL-SCNC: 104 MMOL/L (ref 100–108)
CHLORIDE SERPL-SCNC: 105 MMOL/L (ref 100–108)
CO2 SERPL-SCNC: 17 MMOL/L (ref 21–32)
CO2 SERPL-SCNC: 19 MMOL/L (ref 21–32)
CREAT SERPL-MCNC: 5.44 MG/DL (ref 0.6–1.3)
CREAT SERPL-MCNC: 5.52 MG/DL (ref 0.6–1.3)
GFR SERPL CREATININE-BSD FRML MDRD: 11 ML/MIN/1.73SQ M
GFR SERPL CREATININE-BSD FRML MDRD: 11 ML/MIN/1.73SQ M
GLUCOSE SERPL-MCNC: 109 MG/DL (ref 65–140)
GLUCOSE SERPL-MCNC: 110 MG/DL (ref 65–140)
GLUCOSE SERPL-MCNC: 137 MG/DL (ref 65–140)
GLUCOSE SERPL-MCNC: 148 MG/DL (ref 65–140)
GLUCOSE SERPL-MCNC: 160 MG/DL (ref 65–140)
GLUCOSE SERPL-MCNC: 184 MG/DL (ref 65–140)
GLUCOSE SERPL-MCNC: 199 MG/DL (ref 65–140)
GLUCOSE SERPL-MCNC: 203 MG/DL (ref 65–140)
GLUCOSE SERPL-MCNC: 205 MG/DL (ref 65–140)
GLUCOSE SERPL-MCNC: 265 MG/DL (ref 65–140)
GLUCOSE SERPL-MCNC: 73 MG/DL (ref 65–140)
GRAM STN SPEC: ABNORMAL
GRAM STN SPEC: ABNORMAL
HGB BLD-MCNC: 7.3 G/DL (ref 12–17)
HGB BLD-MCNC: 7.8 G/DL (ref 12–17)
MAGNESIUM SERPL-MCNC: 1.6 MG/DL (ref 1.6–2.6)
MAGNESIUM SERPL-MCNC: 1.7 MG/DL (ref 1.6–2.6)
PHOSPHATE SERPL-MCNC: 4.8 MG/DL (ref 2.7–4.5)
PHOSPHATE SERPL-MCNC: 5 MG/DL (ref 2.7–4.5)
POTASSIUM SERPL-SCNC: 3.4 MMOL/L (ref 3.5–5.3)
POTASSIUM SERPL-SCNC: 3.7 MMOL/L (ref 3.5–5.3)
SODIUM SERPL-SCNC: 137 MMOL/L (ref 136–145)
SODIUM SERPL-SCNC: 138 MMOL/L (ref 136–145)
TACROLIMUS BLD-MCNC: 7.4 NG/ML (ref 2–20)

## 2018-12-23 PROCEDURE — 84100 ASSAY OF PHOSPHORUS: CPT | Performed by: NURSE PRACTITIONER

## 2018-12-23 PROCEDURE — 99232 SBSQ HOSP IP/OBS MODERATE 35: CPT | Performed by: INTERNAL MEDICINE

## 2018-12-23 PROCEDURE — 83735 ASSAY OF MAGNESIUM: CPT | Performed by: NURSE PRACTITIONER

## 2018-12-23 PROCEDURE — 80197 ASSAY OF TACROLIMUS: CPT | Performed by: INTERNAL MEDICINE

## 2018-12-23 PROCEDURE — 99231 SBSQ HOSP IP/OBS SF/LOW 25: CPT | Performed by: ORTHOPAEDIC SURGERY

## 2018-12-23 PROCEDURE — C9113 INJ PANTOPRAZOLE SODIUM, VIA: HCPCS | Performed by: NURSE PRACTITIONER

## 2018-12-23 PROCEDURE — 80048 BASIC METABOLIC PNL TOTAL CA: CPT | Performed by: NURSE PRACTITIONER

## 2018-12-23 PROCEDURE — 85018 HEMOGLOBIN: CPT | Performed by: NURSE PRACTITIONER

## 2018-12-23 PROCEDURE — 82948 REAGENT STRIP/BLOOD GLUCOSE: CPT

## 2018-12-23 PROCEDURE — 99238 HOSP IP/OBS DSCHRG MGMT 30/<: CPT | Performed by: NURSE PRACTITIONER

## 2018-12-23 PROCEDURE — 82330 ASSAY OF CALCIUM: CPT | Performed by: NURSE PRACTITIONER

## 2018-12-23 PROCEDURE — 94640 AIRWAY INHALATION TREATMENT: CPT

## 2018-12-23 PROCEDURE — 99233 SBSQ HOSP IP/OBS HIGH 50: CPT | Performed by: INTERNAL MEDICINE

## 2018-12-23 RX ORDER — POTASSIUM CHLORIDE 20 MEQ/1
20 TABLET, EXTENDED RELEASE ORAL ONCE
Status: COMPLETED | OUTPATIENT
Start: 2018-12-23 | End: 2018-12-23

## 2018-12-23 RX ORDER — MAGNESIUM SULFATE HEPTAHYDRATE 40 MG/ML
2 INJECTION, SOLUTION INTRAVENOUS ONCE
Status: COMPLETED | OUTPATIENT
Start: 2018-12-23 | End: 2018-12-23

## 2018-12-23 RX ORDER — PREDNISONE 10 MG/1
5 TABLET ORAL DAILY
Status: DISCONTINUED | OUTPATIENT
Start: 2018-12-24 | End: 2018-12-23 | Stop reason: HOSPADM

## 2018-12-23 RX ORDER — TACROLIMUS 0.5 MG/1
0.5 CAPSULE ORAL EVERY 12 HOURS SCHEDULED
Status: DISCONTINUED | OUTPATIENT
Start: 2018-12-23 | End: 2018-12-23 | Stop reason: HOSPADM

## 2018-12-23 RX ADMIN — Medication 125 MG: at 18:20

## 2018-12-23 RX ADMIN — MAGNESIUM SULFATE IN WATER 2 G: 40 INJECTION, SOLUTION INTRAVENOUS at 10:22

## 2018-12-23 RX ADMIN — Medication 125 MG: at 06:45

## 2018-12-23 RX ADMIN — NIFEDIPINE 10 MG: 10 CAPSULE ORAL at 08:39

## 2018-12-23 RX ADMIN — HYDROCORTISONE SODIUM SUCCINATE 50 MG: 100 INJECTION, POWDER, FOR SOLUTION INTRAMUSCULAR; INTRAVENOUS at 08:39

## 2018-12-23 RX ADMIN — CEFAZOLIN SODIUM 2000 MG: 2 SOLUTION INTRAVENOUS at 01:09

## 2018-12-23 RX ADMIN — INSULIN LISPRO 2 UNITS: 100 INJECTION, SOLUTION INTRAVENOUS; SUBCUTANEOUS at 18:24

## 2018-12-23 RX ADMIN — SODIUM BICARBONATE 650 MG TABLET 1300 MG: at 16:54

## 2018-12-23 RX ADMIN — Medication 125 MG: at 01:00

## 2018-12-23 RX ADMIN — SODIUM BICARBONATE 650 MG TABLET 1300 MG: at 08:39

## 2018-12-23 RX ADMIN — PANTOPRAZOLE SODIUM 40 MG: 40 INJECTION, POWDER, FOR SOLUTION INTRAVENOUS at 08:38

## 2018-12-23 RX ADMIN — PANTOPRAZOLE SODIUM 40 MG: 40 INJECTION, POWDER, FOR SOLUTION INTRAVENOUS at 19:39

## 2018-12-23 RX ADMIN — DEXTROSE AND SODIUM CHLORIDE 125 ML/HR: 5; .45 INJECTION, SOLUTION INTRAVENOUS at 08:37

## 2018-12-23 RX ADMIN — CEFAZOLIN SODIUM 2000 MG: 2 SOLUTION INTRAVENOUS at 13:05

## 2018-12-23 RX ADMIN — TACROLIMUS 0.5 MG: 0.5 CAPSULE ORAL at 12:54

## 2018-12-23 RX ADMIN — CALCITRIOL 0.5 MCG: 0.25 CAPSULE ORAL at 08:39

## 2018-12-23 RX ADMIN — INSULIN DETEMIR 5 UNITS: 100 INJECTION, SOLUTION SUBCUTANEOUS at 12:54

## 2018-12-23 RX ADMIN — Medication 125 MG: at 12:55

## 2018-12-23 RX ADMIN — POTASSIUM CHLORIDE 20 MEQ: 1500 TABLET, EXTENDED RELEASE ORAL at 08:39

## 2018-12-23 RX ADMIN — POTASSIUM CHLORIDE 20 MEQ: 1500 TABLET, EXTENDED RELEASE ORAL at 16:54

## 2018-12-23 NOTE — PROGRESS NOTES
[address] [facility] NOTE   Dayday Vazquez 51 y o  male MRN: [id number]  Unit/Bed#: ICU 06 Encounter: [id number]  Reason for Consult: MIKE on CKD    ASSESSMENT and PLAN:  Patient is a 41-year-old gentleman with a history of diabetes mellitus type 1, CAD status post MI x2 with stents, end-stage renal disease status post living related donor transplant with a baseline creatinine of 2 5  He presented on 12/20/2018 and was found to have DKA and acute anemia with acute kidney injury      1  Acute kidney injury (POA):   · Pre renal/ +/- ATN due to volume losses/DKA, acute on chronic anemia, sepsis, elevated tacrolimus level  · Renal Transplant ultrasound:  High resistive indices and increased cortical echogenicity likely due to ATN  · Status post IV fluid resuscitation, currently receiving IV fluids D5 half-normal saline at 125 mL an hour  · Renal function slowly improving, now appears to be plateauing in the mid 5s  · Urine output  1345 mL last 24 hr   Almost 14 L positive  Patient on room air with no shortness of breath  Lasix 60 mg x1 can be utilized if patient becomes short of breath  IV fluids D5 half-normal saline at 125 mL an hour  He is NPO except for sips of fluid  · Urinalysis:  Small amount of blood, no RBCs, 1-2 WBCs, innumerable bacteria, 2+ protein  · Chowdary catheter removed on 12/22   · Awaiting transfer to [place]  2  Chronic kidney disease, stage IIIB to 4:    · Status post living related donor transplant x2  · Last transplant 2001  · Baseline creatinine previously 2 5 but more recently around 3    · Low muscle mass-creatinine may overestimate GFR  3  Immunosuppression/ Status post living related donor transplant x2:    · Tacrolimus on hold due to elevated trough of 13  · Follow-up tacrolimus level 7 4- remains above goal which is 3-5 this far out from transplant  But level rapidly declining therefore we will restart Prograf at 0 5 mg every 12 hours    First dose now and then next dose this evening depending on the receiving facilities schedule  · Previously on 1 5 mg Prograf every 12 hr    4  Azotemia:    · BUN improving  Level 160 on admission, currently down to 94  5  Hyperphosphatemia:     · Phosphorus mildly elevated, 4 8  · Patient NPO except for sips of fluid  6  Acute anemia/upper GI bleed:    · Status post 1 unit of packed cells on 12/20 for hemoglobin of 6 1  · Hemoglobin up to 8 8 post transfusion, drifting down and currently in the low 7's  · Transfuse for hemoglobin less than 7  Use leuko reduced irradiated CMV-negative blood  · Clot/mass identified in the distal esophagus  7  Hypokalemia:    · Potassium 3 4    received 20 mEq of K-Dur this morning  · Planned for another 20 mEq of K-Dur this afternoon  · Magnesium level 1 6  Give 1 dose of magnesium sulfate 2 g  8  Sepsis secondary to Staph aureus bacteremia in the setting of chronic osteomyelitis  · Id following  · Follow-up blood cultures no growth  · On IV Ancef and PO Vancomycin  · Initial procalcitonin level 35 23   9  Chronic osteomyelitis right ankle:   · Abscess suspected  Orthopedics following  · Transferred to [facility] in [place] for management  10  DKA in the setting of type 1 diabetes mellitus:    · Management per Critical Care  11  Hypertension:    · Permissive elevation of blood pressure  · On nifedipine 10 mg daily  12  Anion gap metabolic acidosis in the setting of DKA  · Lactic acid 1 0     · Bicarbonate improved to 21 and bicarbonate infusion was discontinued  · Bicarbonate level dropped and sodium bicarbonate tablets were initiated on 12/22  · Bicarbonate level up to 19 today-continue to monitor    DISPOSITION:  Awaiting transfer to [facility] which is planned for today  Restart Prograf at reduced dose 0 5 mg q 12 hours    SUBJECTIVE / INTERVAL HISTORY:  Patient states he feels okay today  He does not feel short of breath  He does not feel bloated    No nausea or vomiting  OBJECTIVE:  Current Weight: Weight - Scale: 55 5 kg (122 lb 5 7 oz)  Vitals:    12/23/18 0430 12/23/18 0530 12/23/18 0630 12/23/18 0700   BP: 142/73 154/80 160/78    Pulse: 90 92 98    Resp: 20 17 (!) 23    Temp:    97 6 °F (36 4 °C)   TempSrc:    Temporal   SpO2: 97% 97% 97%    Weight:       Height:           Intake/Output Summary (Last 24 hours) at 12/23/18 0950  Last data filed at 12/23/18 0631   Gross per 24 hour   Intake          3022 37 ml   Output             1190 ml   Net          1832 37 ml     General:  No acute distress  Quietly lying in bed  Very weak  Skin:  Warm and dry, no rash  Eyes:  Sclera clear  ENT:  Oropharynx moist  Neck:  Supple no JVD appreciated  Chest:  Clear bilaterally, decreased breath sounds in the bases  CVS:  Regular rate and rhythm, no murmur rub or gallop  Abdomen:  Firm, slightly distended appearing  Bowel sounds normoactive  Extremities:  Right foot wrapped, mild edema    :  No Chowdary  Neuro:  Alert and oriented  Psych:  Appropriate mood and affect  Medications:    Current Facility-Administered Medications:     acetaminophen (TYLENOL) tablet 650 mg, 650 mg, Oral, Q6H PRN, Maryrose Marie Spatzer, CRNP    calcitriol (ROCALTROL) capsule 0 5 mcg, 0 5 mcg, Oral, Daily, Maryrose Marie Spatzer, CRNP, 0 5 mcg at 12/23/18 6111    ceFAZolin (ANCEF) IVPB (premix) 2,000 mg, 2,000 mg, Intravenous, Q12H, Valerie Wall MD, Stopped at 12/23/18 0139    dextrose 5 % and sodium chloride 0 45 % infusion, 125 mL/hr, Intravenous, Continuous, Maryrose Marie Spatzer, CRNP, Last Rate: 125 mL/hr at 12/23/18 0837, 125 mL/hr at 12/23/18 0837    hydrALAZINE (APRESOLINE) injection 5 mg, 5 mg, Intravenous, Q6H PRN, GAVINO Fernandez    hydrocortisone sodium succinate (PF) (Solu-CORTEF) injection 50 mg, 50 mg, Intravenous, Daily, Meryle Jacquet Bilofsky, CRNP, 50 mg at 12/23/18 0839    insulin regular (HumuLIN R,NovoLIN R) 1 Units/mL in sodium chloride 0 9 % 100 mL infusion, 0 3-21 Units/hr, Intravenous, Titrated, GAVINO Painting, Last Rate: 0 3 mL/hr at 12/23/18 0834, 0 3 Units/hr at 12/23/18 0834    metoclopramide (REGLAN) injection 5 mg, 5 mg, Intravenous, Q6H PRN, Houston Kicks Spatzer, CRNP    NIFEdipine (PROCARDIA) capsule 10 mg, 10 mg, Oral, Daily, GAVINO Sewell, 10 mg at 12/23/18 0839    ondansetron (ZOFRAN) injection 4 mg, 4 mg, Intravenous, Q6H PRN, Houston Kicks Spatzer, CRNP    pantoprazole (PROTONIX) injection 40 mg, 40 mg, Intravenous, Q12H [address], GAVINO Sewell, 40 mg at 12/23/18 0581    sodium bicarbonate tablet 1,300 mg, 1,300 mg, Oral, BID after meals, Wendy K Doug, DO, 1,300 mg at 12/23/18 8035    vancomycin (VANCOCIN) oral solution 125 mg, 125 mg, Oral, Q6H [address], GAVINO Sewell, 125 mg at 12/23/18 0645    Laboratory Results:    Results from last 7 days  Lab Units 12/23/18  0614 12/23/18  0052 12/22/18  1827 12/22/18  1222 12/22/18  0440 12/22/18  0439 12/22/18  0019 12/21/18  1754 12/21/18  1143  12/21/18  0504  12/20/18  0428  12/19/18  0828   WBC Thousand/uL  --   --   --   --   --  10 98*  --   --   --   --  9 56  --  13 53*  --  19 49*   HEMOGLOBIN g/dL 7 3* 7 8* 7 9* 8 2*  --  7 3* 7 3* 7 6* 7 5*  --  7 6*  < > 6 1*  --  8 7*   HEMATOCRIT %  --   --   --   --   --  22 7*  --   --   --   --  22 7*  --  19 4*  --  26 2*   PLATELETS Thousands/uL  --   --   --   --   --  230  --   --   --   --  230  --  282  --  347   POTASSIUM mmol/L 3 4* 3 7 3 8 4 2 3 4*  --   --  3 3* 3 2*  < > 3 7  < > 3 9  3 9  < > 3 4*   CHLORIDE mmol/L 105 104 105 106 108  --   --  104 103  < > 102  < > 96*  96*  < > 89*   CO2 mmol/L 19* 17* 19* 18* 19*  --   --  21 19*  < > 18*  < > 12*  12*  < > 13*   BUN mg/dL 94* 101* 96* 102* 110*  --   --  115* 125*  < > 132*  < > 162*  162*  < > 129*   CREATININE mg/dL 5 52* 5 44* 5 77* 5 58* 5 80*  --   --  5 87* 5 96*  < > 6 18*  < > 6 71*  6 71*  < > 6 69*   CALCIUM mg/dL 8 2* 8 2* 8 1* 8 4 8 1*  --   --  7 5* 7 7*  < > 8 0*  < > 7 9*  7 9*  < > 7 6*   MAGNESIUM mg/dL 1 6 1 7 1 7 1 9 1 9  --   --  1 6 1 8  < > 1 9  < > 2 2  --  2 4   PHOSPHORUS mg/dL 4 8* 5 0* 4 7* 4 6* 4 5  --   --  5 0* 5 0*  < > 4 9*  < >  --   --  7 8*   < > = values in this interval not displayed    Work up:

## 2018-12-23 NOTE — PROGRESS NOTES
Orthopaedic Surgery - Progress Note  Jeannette Walker (50 y o  male)   : 1970   MRN: [id number]  Date: 2018   Encounter: [id number]   Unit/Bed#: ICU 06    Assessment / Plan  Left ankle infection with retained hardware and sepsis    · Awaiting transfer to [place]  · Antibiotics per primary team  · Will follow    Subjective  Reports continued improvement in pain since starting antibiotics  Vitals  Temp:  [98 2 °F (36 8 °C)-100 3 °F (37 9 °C)] 98 2 °F (36 8 °C)  HR:  [] 98  Resp:  [14-32] 23  BP: ()/(42-83) 160/78  Body mass index is 21 kg/m²  I/O last 24 hours:   In: 3454 5 [I V :3154 5; IV Piggyback:300]  Out: 8448 [Urine:1345]    Ortho Exam - Left Lower Extremity  · Ankle and foot with moderate swelling and diffuse tenderness  · Ulcerations on plantar aspect of heel and posterior heel with mild purulent drainage  · Diminished sensation in BLE c/w baseline  · Foot warm, diminished DP pulse    Lab Results  (I have personally reviewed pertinent lab results )    Results from last 7 days  Lab Units 18  0614 18  0052 18  1827 18  1222 18  0439 18  0019 18  1754 18  1143 18  0504 18  2259 18  1651 18  0519 18  0428 18  0828   WBC Thousand/uL  --   --   --   --  10 98*  --   --   --  9 56  --   --   --  13 53* 19 49*   HEMOGLOBIN g/dL 7 3* 7 8* 7 9* 8 2* 7 3* 7 3* 7 6* 7 5* 7 6* 8 0* 8 8* 6 1* 6 1* 8 7*   HEMATOCRIT %  --   --   --   --  22 7*  --   --   --  22 7*  --   --   --  19 4* 26 2*   PLATELETS Thousands/uL  --   --   --   --  230  --   --   --  230  --   --   --  282 347       Results from last 7 days  Lab Units 18  0922 18  0828   PTT seconds  --  49*   INR  1 37* 1 15       Results from last 7 days  Lab Units 18  1421 18  0052 18  1827 18  1222 18  0440 18  1754 18  1143 18  9530 18  0504 18  0102 18  2100 18  1651 12/20/18  1236 12/20/18  0428 12/20/18  0104 12/19/18  2104 12/19/18  1502 12/19/18  0828   POTASSIUM mmol/L 3 4* 3 7 3 8 4 2 3 4* 3 3* 3 2* 3 3* 3 7 3 3* 3 6 3 6 3 5 3 9  3 9 3 5 3 9 3 8 3 4*   CHLORIDE mmol/L 105 104 105 106 108 104 103 102 102 98* 99* 99* 98* 96*  96* 98 96* 94* 89*   CO2 mmol/L 19* 17* 19* 18* 19* 21 19* 19* 18* 16* 17* 17* 14* 12*  12* 11* 11* 13* 13*   BUN mg/dL 94* 101* 96* 102* 110* 115* 125* 125* 132* 137* 143* 143* 157* 162*  162* 144* 140* 125* 129*   CREATININE mg/dL 5 52* 5 44* 5 77* 5 58* 5 80* 5 87* 5 96* 6 09* 6 18* 6 29* 6 19* 6 37* 6 59* 6 71*  6 71* 6 26* 6 20* 6 12* 6 69*   EGFR ml/min/1 73sq m 11 11 11 11 11 10 10 10 10 10 10 9 9 9  9 10 10 10 9   CALCIUM mg/dL 8 2* 8 2* 8 1* 8 4 8 1* 7 5* 7 7* 7 7* 8 0* 8 1* 8 1* 8 0* 7 8* 7 9*  7 9* 7 8* 7 7* 7 6* 7 6*   PHOSPHORUS mg/dL 4 8* 5 0* 4 7* 4 6* 4 5 5 0* 5 0* 5 0* 4 9* 5 8* 5 7* 5 8* 5 7*  --   --   --   --  7 8*   ALK PHOS U/L  --   --   --   --   --   --   --   --   --   --   --   --   --  157*  --  122 123 173*   ALT U/L  --   --   --   --   --   --   --   --   --   --   --   --   --  14  --  9 10 18   AST U/L  --   --   --   --   --   --   --   --   --   --   --   --   --  15  --  10* 11* 14           Results from last 7 days  Lab Units 12/21/18  1901 12/20/18  1921 12/20/18  1720 12/19/18  0913 12/19/18  0830   WOUND CULTURE  Culture too young- will reincubate  --   --   --   --    BLOOD CULTURE   --  Staphylococcus aureus* Staphylococcus aureus* Staphylococcus aureus* Staphylococcus aureus*   GRAM STAIN RESULT  3+ Polys  2+ Gram positive cocci in clusters Gram positive cocci in clusters Gram positive cocci in clusters Gram positive cocci in clusters Gram positive cocci in clusters       Jerilyn Meléndez MD

## 2018-12-23 NOTE — PROGRESS NOTES
Progress Note - Critical Care   Nicik Romero 51 y o  male MRN: [id number]  Unit/Bed#: ICU 06 Encounter: [id number]    Assessment/Plan:  1  Sepsis secondary to staph aureus bacteremia in the setting of chronic osteomyelitis with suspected abscess of the right ankle  1  Infectious disease following and appreciate their recommendations  2  Continue Ancef  3  Possible transfer to [place] for source control if patient unable to be transferred to [facility]  2  Chronic osteomyelitis of right ankle with suspected abscess with MSSA  1  Right lower extremity wound open and is draining purulent fluid  2  Transfer to [address] [place] [address] for further intervention and possible hardware removal  3  type 1 DM with hyperglycemia  1  Continue critical care insulin infusion  4  Anion gap metabolic acidosis  1  Nephrology following  2  Continue Sodium bicarbonate tablets with meals  5  Acute kidney injury on CKD stage III status post transplant x2 in 1988 and 2001  1  Nephrology following  Creatinine slowly improving with creatinine 5 4  2  Monitor prograf level  If trending down would consider restarting low dose prograf  6  Acute gastrointestinal hemorrhage possibly related to esophageal mass  1  GI following will eventually need a re-biopsy EGD  If not transferred to [facility] would consider on Monday  2  Continue to monitor hemoglobin  3  Consider restarting heparin for DVT prophylaxis now that hemoglobin is stable  7  Acute blood loss anemia secondary to GI bleed  1  Hemoglobin stable (7 8)        Critical Care Time:   Documented critical care time excludes any procedures documented elsewhere   It also excludes any family updates    _____________________________________________________________________    HPI/24hr events:   No events overnight    Medications:    Current Facility-Administered Medications:  acetaminophen 650 mg Oral Q6H PRN Ladon Hasty Spatzer, CRNP    calcitriol 0 5 mcg Oral Daily Ladon Hasty Spatzer, CRNP    cefazolin 2,000 mg Intravenous Q12H Mónica Cortes MD Last Rate: Stopped (12/23/18 0139)   dextrose 5 % and sodium chloride 0 45 % 125 mL/hr Intravenous Continuous Bárbara Pleas Spatzer, CRNP Last Rate: 125 mL/hr (12/22/18 2354)   hydrALAZINE 5 mg Intravenous Q6H PRN [facility], CRNP    hydrocortisone sodium succinate 50 mg Intravenous Daily Belén K Tony, CRNP    insulin regular (HumuLIN R,NovoLIN R) infusion 0 3-21 Units/hr Intravenous Titrated Kelli Hoop, CRNP Last Rate: 4 Units/hr (12/23/18 0402)   metoclopramide 5 mg Intravenous Q6H PRN Bárbara Pleas Spatzer, CRNP    NIFEdipine 10 mg Oral Daily [facility], CRNP    ondansetron 4 mg Intravenous Q6H PRN Bárbara Pleas Spatzer, CRNP    pantoprazole 40 mg Intravenous Q12H [facility], CRNP    sodium bicarbonate 1,300 mg Oral BID after meals Wendy Masood Lords, DO    vancomycin 125 mg Oral Q6H [facility], CRNP          dextrose 5 % and sodium chloride 0 45 % 125 mL/hr Last Rate: 125 mL/hr (12/22/18 2354)   insulin regular (HumuLIN R,NovoLIN R) infusion 0 3-21 Units/hr Last Rate: 4 Units/hr (12/23/18 0402)         Physical exam:  Vitals: Body mass index is 21 kg/m²  Blood pressure 154/80, pulse 92, temperature 98 2 °F (36 8 °C), temperature source Temporal, resp  rate 17, height 5' 4" (1 626 m), weight 55 5 kg (122 lb 5 7 oz), SpO2 97 %  ,  Temp  Min: 97 4 °F (36 3 °C)  Max: 100 6 °F (38 1 °C)  IBW: 59 2 kg    SpO2: 97 %  SpO2 Activity: At Rest  O2 Device: Nasal cannula      Intake/Output Summary (Last 24 hours) at 12/23/18 0541  Last data filed at 12/23/18 0500   Gross per 24 hour   Intake          4108 39 ml   Output             1025 ml   Net          3083 39 ml       Invasive/non-invasive ventilation settings:   Respiratory    Lab Data (Last 4 hours)    None         O2/Vent Data (Last 4 hours)    None              Invasive Devices     Peripheral Intravenous Line            Peripheral IV 12/19/18 Right Hand 3 days    Peripheral IV 12/19/18 Right Wrist 3 days    Peripheral IV 12/21/18 Right Antecubital 2 days    Peripheral IV 12/22/18 Left Forearm 1 day                  Physical Exam:  Gen: No acute distress  HEENT: NC/AT PERRLA EOMI  Neck: supple, no JVD, trachea midline, no adenopathy  Chest: CTA  Cor: Clear S1 and S2  Abd: Soft NT/ND +BS  Ext: right foot dressing intact  Neuro: A&Ox3, non-focal  Skin: W/D      Diagnostic Data:  Lab: I have personally reviewed pertinent lab results  CBC:     Results from last 7 days  Lab Units 12/23/18  0052 12/22/18  1827 12/22/18  1222 12/22/18  0439  12/21/18  0504  12/20/18  0428   WBC Thousand/uL  --   --   --  10 98*  --  9 56  --  13 53*   HEMOGLOBIN g/dL 7 8* 7 9* 8 2* 7 3*  < > 7 6*  < > 6 1*   HEMATOCRIT %  --   --   --  22 7*  --  22 7*  --  19 4*   PLATELETS Thousands/uL  --   --   --  230  --  230  --  282   < > = values in this interval not displayed  CMP:     Results from last 7 days  Lab Units 12/23/18  0052 12/22/18  1827 12/22/18  1222  12/20/18  0428  12/19/18  2104 12/19/18  1502   SODIUM mmol/L 137 138 139  < > 129*  129*  < > 125* 124*   POTASSIUM mmol/L 3 7 3 8 4 2  < > 3 9  3 9  < > 3 9 3 8   CHLORIDE mmol/L 104 105 106  < > 96*  96*  < > 96* 94*   CO2 mmol/L 17* 19* 18*  < > 12*  12*  < > 11* 13*   BUN mg/dL 101* 96* 102*  < > 162*  162*  < > 140* 125*   CREATININE mg/dL 5 44* 5 77* 5 58*  < > 6 71*  6 71*  < > 6 20* 6 12*   CALCIUM mg/dL 8 2* 8 1* 8 4  < > 7 9*  7 9*  < > 7 7* 7 6*   ALK PHOS U/L  --   --   --   --  157*  --  122 123   ALT U/L  --   --   --   --  14  --  9 10   AST U/L  --   --   --   --  15  --  10* 11*   < > = values in this interval not displayed    PT/INR:   No results found for: PT, INR,   Magnesium:   Results from last 7 days  Lab Units 12/23/18 0052 12/22/18 1827 12/22/18  1222   MAGNESIUM mg/dL 1 7 1 7 1 9     Phosphorous:   Results from last 7 days  Lab Units 12/23/18 0052 12/22/18  1827 12/22/18  1222   PHOSPHORUS mg/dL 5 0* 4 7* 4 6*       Microbiology:    Results from last 7 days  Lab Units 12/21/18  1901 12/20/18  1921 12/20/18  1720 12/19/18  0913 12/19/18  0830 12/19/18  0828   BLOOD CULTURE   --  Staphylococcus aureus* Staphylococcus aureus* Staphylococcus aureus* Staphylococcus aureus*  --    GRAM STAIN RESULT  3+ Polys  2+ Gram positive cocci in clusters Gram positive cocci in clusters Gram positive cocci in clusters Gram positive cocci in clusters Gram positive cocci in clusters  --    WOUND CULTURE  Culture too young- will reincubate  --   --   --   --   --    INFLUENZA B PCR   --   --   --   --   --  None Detected   RSV PCR   --   --   --   --   --  None Detected       Imaging:  CT ankle: There is a fluid collection which measures about 3 8 x 3 7 x 3 8 cm in the plantar aspect of the hindfoot located in relation to the distal end of the intramedullary nail extending into the plantar soft tissue and subcutaneous tissue and traversing the   plantar fascia    Cardiac lab/EKG/telemetry/ECHO:   SR    VTE Prophylaxis: SCD    Code Status: Level 1 - Full Code    GAVINO Jimenez    Portions of the record may have been created with voice recognition software  Occasional wrong word or "sound a like" substitutions may have occurred due to the inherent limitations of voice recognition software  Read the chart carefully and recognize, using context, where substitutions have occurred

## 2018-12-24 LAB
BACTERIA WND AEROBE CULT: ABNORMAL
GRAM STN SPEC: ABNORMAL
GRAM STN SPEC: ABNORMAL

## 2018-12-24 NOTE — PROGRESS NOTES
Report called to MITALI PAYNE   Status and treatments relayed  Will be going via ground transport  All belongings given to wife @ bedside, she will be following via own transportation

## 2018-12-24 NOTE — DISCHARGE SUMMARY
Discharge Summary - Cristela Ordonez 51 y o  male MRN: [id number]    Unit/Bed#: ICU 06 Encounter: [id number]    Admission Date:   Admission Orders     Ordered        12/20/18 0525  Inpatient Admission  Once               Admitting Diagnosis: Severe sepsis ([facility] [address] ) [A41 9, R65 20]    HPI: Cristela Ordonez is a 51 y o  male with a history of DM Type I, CAD with MI status post 2 stents, status post renal transplant x1 in 1988 and 2001 on chronic immunosuppression and ankle fracture s/p ORIF with recurrent osteomyelitis who presents as a transfer from [place], [facility]  The patient originally presented to [address] [place] [address] on 12/19 complaining of right ankle pain and generalized weakness  He also endorses that he was having some low-grade fevers  Patient had a Charcot deformity for which she had surgery at [facility] in August 2018  Since then he has had recurrent osteomyelitis  Work-up in the ED was concerning for developing sepsis so the patient was transferred to [address] [place] [address] for inpatient admission  He was found to be acidotic and in acute renal failure with a  Creatinine >6  He was started on a bicarb gtt however his acidosis did not significantly improve and he was transferred to [facility] with possible need for RRT  The case was discussed with [facility] and they agreed to accept the patient given his history of renal transplant and recurrent infections however there were no beds immediately available          Procedures Performed:   Orders Placed This Encounter   Procedures   [address]       Summary of Hospital Course: The patient was admitted with DKA and mild anion gap metabolic acidosis  Additionally he was found to have bacteremia with positive blood cultures thought to be related to an abscess/osteomyelitis of the right ankle with a previous history of surgical hardware implanted  He was treated for his the in DKA with insulin and IV fluids and this improved    Additionally he was seen by infectious disease and was placed on broad-spectrum antibiotics  He is status post a renal transplant from 2001 and is currently on immunosuppression  Some of these agents were held given his septic/bacteremic picture and he was continued on prednisone  He was followed by Nephrology during his admission because of his elevated creatinine level  The patient is well known to the transplant service at the [address] [place] [address] as well as the Orthopedic Service at the [facility]  Given his on going Staph aureus bacteremia related to his osteomyelitis of the right lower extremity the decision was made to transfer the patient to the [facility] for further definitive management  He will need to be restarted on his Prograf likely tomorrow  Significant Findings, Care, Treatment and Services Provided: There was a question of a esophageal mass that was noted previously however the patient was unable to undergo biopsies because of his anticoagulation with Plavix and immunosuppressive therapy with Imuran  He will need to have this evaluated as an outpatient  Complications:     Discharge Diagnosis:  Staph aureus bacteremia from infected right lower extremity ankle hardware  Resolved Problems  Date Reviewed: 12/23/2018          Resolved    * (Principal)Acute renal failure (ARF) ([facility] [address] ) 12/21/2018     Resolved by  GAVINO Murcia    Hyponatremia 12/21/2018     Resolved by  GAVINO Murcia          Condition at Discharge: stable         Discharge instructions/Information to patient and family:   See after visit summary for information provided to patient and family  Provisions for Follow-Up Care:  See after visit summary for information related to follow-up care and any pertinent home health orders        PCP: Dario Tipton DO    Disposition: Transferred to [facility]    Planned Readmission: No    Discharge Statement   I spent 30 minutes discharging the patient  This time was spent on the day of discharge  I had direct contact with the patient on the day of discharge  Additional documentation is required if more than 30 minutes were spent on discharge  Discharge Medications:  See after visit summary for reconciled discharge medications provided to patient and family

## 2018-12-27 LAB
BACTERIA BLD CULT: NORMAL
CMV DNA SERPL NAA+PROBE-ACNC: NEGATIVE IU/ML
CMV DNA SERPL NAA+PROBE-LOG IU: NORMAL LOG10 IU/ML

## 2018-12-29 ENCOUNTER — HOSPITAL ENCOUNTER (EMERGENCY)
Facility: HOSPITAL | Age: 48
Discharge: ANOTHER HOSPITAL | End: 2018-12-30
Attending: EMERGENCY MEDICINE | Admitting: EMERGENCY MEDICINE
Payer: COMMERCIAL

## 2018-12-29 ENCOUNTER — APPOINTMENT (EMERGENCY)
Dept: RADIOLOGY | Facility: HOSPITAL | Age: 48
End: 2018-12-29
Payer: COMMERCIAL

## 2018-12-29 DIAGNOSIS — K92.2 GI BLEED: ICD-10-CM

## 2018-12-29 DIAGNOSIS — N18.4 CHRONIC KIDNEY DISEASE, STAGE 4 (SEVERE) (HCC): ICD-10-CM

## 2018-12-29 DIAGNOSIS — I95.9 HYPOTENSIVE EPISODE: ICD-10-CM

## 2018-12-29 DIAGNOSIS — R53.83 LETHARGIC: ICD-10-CM

## 2018-12-29 DIAGNOSIS — R77.8 ELEVATED TROPONIN: ICD-10-CM

## 2018-12-29 DIAGNOSIS — T68.XXXA: ICD-10-CM

## 2018-12-29 DIAGNOSIS — D72.829 LEUKOCYTOSIS: ICD-10-CM

## 2018-12-29 DIAGNOSIS — E83.42 HYPOMAGNESEMIA: ICD-10-CM

## 2018-12-29 DIAGNOSIS — D64.9 ANEMIA: Primary | ICD-10-CM

## 2018-12-29 LAB
APTT PPP: 47 SECONDS (ref 26–38)
BASOPHILS # BLD AUTO: 0.03 THOUSANDS/ΜL (ref 0–0.1)
BASOPHILS NFR BLD AUTO: 0 % (ref 0–1)
EOSINOPHIL # BLD AUTO: 0.08 THOUSAND/ΜL (ref 0–0.61)
EOSINOPHIL NFR BLD AUTO: 0 % (ref 0–6)
ERYTHROCYTE [DISTWIDTH] IN BLOOD BY AUTOMATED COUNT: 14.2 % (ref 11.6–15.1)
GLUCOSE SERPL-MCNC: 350 MG/DL (ref 65–140)
HCT VFR BLD AUTO: 9.7 % (ref 36.5–49.3)
HGB BLD-MCNC: 3 G/DL (ref 12–17)
IMM GRANULOCYTES # BLD AUTO: 0.5 THOUSAND/UL (ref 0–0.2)
IMM GRANULOCYTES NFR BLD AUTO: 2 % (ref 0–2)
INR PPP: 1.53 (ref 0.86–1.17)
LYMPHOCYTES # BLD AUTO: 1.3 THOUSANDS/ΜL (ref 0.6–4.47)
LYMPHOCYTES NFR BLD AUTO: 6 % (ref 14–44)
MCH RBC QN AUTO: 29.7 PG (ref 26.8–34.3)
MCHC RBC AUTO-ENTMCNC: 30.9 G/DL (ref 31.4–37.4)
MCV RBC AUTO: 96 FL (ref 82–98)
MONOCYTES # BLD AUTO: 1.14 THOUSAND/ΜL (ref 0.17–1.22)
MONOCYTES NFR BLD AUTO: 5 % (ref 4–12)
NEUTROPHILS # BLD AUTO: 19.81 THOUSANDS/ΜL (ref 1.85–7.62)
NEUTS SEG NFR BLD AUTO: 87 % (ref 43–75)
NRBC BLD AUTO-RTO: 0 /100 WBCS
PLATELET # BLD AUTO: 263 THOUSANDS/UL (ref 149–390)
PMV BLD AUTO: 10.1 FL (ref 8.9–12.7)
PROTHROMBIN TIME: 17.7 SECONDS (ref 11.8–14.2)
RBC # BLD AUTO: 1.01 MILLION/UL (ref 3.88–5.62)
WBC # BLD AUTO: 22.86 THOUSAND/UL (ref 4.31–10.16)

## 2018-12-29 PROCEDURE — 86850 RBC ANTIBODY SCREEN: CPT | Performed by: EMERGENCY MEDICINE

## 2018-12-29 PROCEDURE — 82550 ASSAY OF CK (CPK): CPT | Performed by: EMERGENCY MEDICINE

## 2018-12-29 PROCEDURE — 80053 COMPREHEN METABOLIC PANEL: CPT | Performed by: EMERGENCY MEDICINE

## 2018-12-29 PROCEDURE — 83605 ASSAY OF LACTIC ACID: CPT | Performed by: EMERGENCY MEDICINE

## 2018-12-29 PROCEDURE — 85610 PROTHROMBIN TIME: CPT | Performed by: EMERGENCY MEDICINE

## 2018-12-29 PROCEDURE — 85025 COMPLETE CBC W/AUTO DIFF WBC: CPT | Performed by: EMERGENCY MEDICINE

## 2018-12-29 PROCEDURE — 94660 CPAP INITIATION&MGMT: CPT

## 2018-12-29 PROCEDURE — 86901 BLOOD TYPING SEROLOGIC RH(D): CPT | Performed by: EMERGENCY MEDICINE

## 2018-12-29 PROCEDURE — 94760 N-INVAS EAR/PLS OXIMETRY 1: CPT

## 2018-12-29 PROCEDURE — 86920 COMPATIBILITY TEST SPIN: CPT

## 2018-12-29 PROCEDURE — 82009 KETONE BODYS QUAL: CPT | Performed by: EMERGENCY MEDICINE

## 2018-12-29 PROCEDURE — 71045 X-RAY EXAM CHEST 1 VIEW: CPT

## 2018-12-29 PROCEDURE — 83735 ASSAY OF MAGNESIUM: CPT | Performed by: EMERGENCY MEDICINE

## 2018-12-29 PROCEDURE — 82948 REAGENT STRIP/BLOOD GLUCOSE: CPT

## 2018-12-29 PROCEDURE — 36430 TRANSFUSION BLD/BLD COMPNT: CPT

## 2018-12-29 PROCEDURE — 99285 EMERGENCY DEPT VISIT HI MDM: CPT

## 2018-12-29 PROCEDURE — 84100 ASSAY OF PHOSPHORUS: CPT | Performed by: EMERGENCY MEDICINE

## 2018-12-29 PROCEDURE — 84484 ASSAY OF TROPONIN QUANT: CPT | Performed by: EMERGENCY MEDICINE

## 2018-12-29 PROCEDURE — 36415 COLL VENOUS BLD VENIPUNCTURE: CPT | Performed by: EMERGENCY MEDICINE

## 2018-12-29 PROCEDURE — 82805 BLOOD GASES W/O2 SATURATION: CPT | Performed by: EMERGENCY MEDICINE

## 2018-12-29 PROCEDURE — 85730 THROMBOPLASTIN TIME PARTIAL: CPT | Performed by: EMERGENCY MEDICINE

## 2018-12-29 PROCEDURE — 86900 BLOOD TYPING SEROLOGIC ABO: CPT | Performed by: EMERGENCY MEDICINE

## 2018-12-29 PROCEDURE — 96375 TX/PRO/DX INJ NEW DRUG ADDON: CPT

## 2018-12-29 PROCEDURE — 93005 ELECTROCARDIOGRAM TRACING: CPT

## 2018-12-29 PROCEDURE — 84145 PROCALCITONIN (PCT): CPT | Performed by: EMERGENCY MEDICINE

## 2018-12-29 RX ORDER — ETOMIDATE 2 MG/ML
10 INJECTION INTRAVENOUS ONCE
Status: DISCONTINUED | OUTPATIENT
Start: 2018-12-30 | End: 2018-12-30

## 2018-12-29 RX ORDER — MAGNESIUM SULFATE HEPTAHYDRATE 40 MG/ML
2 INJECTION, SOLUTION INTRAVENOUS ONCE
Status: COMPLETED | OUTPATIENT
Start: 2018-12-29 | End: 2018-12-30

## 2018-12-29 RX ORDER — ROCURONIUM BROMIDE 10 MG/ML
50 INJECTION, SOLUTION INTRAVENOUS ONCE
Status: DISCONTINUED | OUTPATIENT
Start: 2018-12-30 | End: 2018-12-30 | Stop reason: HOSPADM

## 2018-12-29 RX ADMIN — HYDROCORTISONE SODIUM SUCCINATE 200 MG: 100 INJECTION, POWDER, FOR SOLUTION INTRAMUSCULAR; INTRAVENOUS at 23:44

## 2018-12-30 VITALS
SYSTOLIC BLOOD PRESSURE: 106 MMHG | TEMPERATURE: 97.7 F | HEART RATE: 85 BPM | OXYGEN SATURATION: 99 % | RESPIRATION RATE: 18 BRPM | DIASTOLIC BLOOD PRESSURE: 62 MMHG

## 2018-12-30 LAB
ABO GROUP BLD: NORMAL
ACETONE SERPL-MCNC: NEGATIVE MG/DL
ALBUMIN SERPL BCP-MCNC: 1.2 G/DL (ref 3.5–5)
ALP SERPL-CCNC: 120 U/L (ref 46–116)
ALT SERPL W P-5'-P-CCNC: <6 U/L (ref 12–78)
ANION GAP SERPL CALCULATED.3IONS-SCNC: 15 MMOL/L (ref 4–13)
AST SERPL W P-5'-P-CCNC: 20 U/L (ref 5–45)
BASE EX.OXY STD BLDV CALC-SCNC: 68.4 % (ref 60–80)
BASE EXCESS BLDV CALC-SCNC: -8.5 MMOL/L
BILIRUB SERPL-MCNC: 0.1 MG/DL (ref 0.2–1)
BLD GP AB SCN SERPL QL: NEGATIVE
BUN SERPL-MCNC: 60 MG/DL (ref 5–25)
CALCIUM SERPL-MCNC: 7 MG/DL (ref 8.3–10.1)
CHLORIDE SERPL-SCNC: 101 MMOL/L (ref 100–108)
CK SERPL-CCNC: 60 U/L (ref 39–308)
CO2 SERPL-SCNC: 19 MMOL/L (ref 21–32)
CREAT SERPL-MCNC: 4.37 MG/DL (ref 0.6–1.3)
GFR SERPL CREATININE-BSD FRML MDRD: 15 ML/MIN/1.73SQ M
GLUCOSE SERPL-MCNC: 262 MG/DL (ref 65–140)
HCO3 BLDV-SCNC: 17.3 MMOL/L (ref 24–30)
LACTATE SERPL-SCNC: 6.3 MMOL/L (ref 0.5–2)
MAGNESIUM SERPL-MCNC: 1.5 MG/DL (ref 1.6–2.6)
O2 CT BLDV-SCNC: 3.6 ML/DL
PCO2 BLDV: 37 MM HG (ref 42–50)
PH BLDV: 7.29 [PH] (ref 7.3–7.4)
PHOSPHATE SERPL-MCNC: 5.2 MG/DL (ref 2.7–4.5)
PO2 BLDV: 41.5 MM HG (ref 35–45)
POTASSIUM SERPL-SCNC: 3.5 MMOL/L (ref 3.5–5.3)
PROCALCITONIN SERPL-MCNC: 0.9 NG/ML
PROT SERPL-MCNC: 4 G/DL (ref 6.4–8.2)
RH BLD: POSITIVE
SODIUM SERPL-SCNC: 135 MMOL/L (ref 136–145)
SPECIMEN EXPIRATION DATE: NORMAL
TROPONIN I SERPL-MCNC: 0.11 NG/ML

## 2018-12-30 PROCEDURE — P9016 RBC LEUKOCYTES REDUCED: HCPCS

## 2018-12-30 PROCEDURE — 96365 THER/PROPH/DIAG IV INF INIT: CPT

## 2018-12-30 RX ORDER — ETOMIDATE 2 MG/ML
20 INJECTION INTRAVENOUS ONCE
Status: DISCONTINUED | OUTPATIENT
Start: 2018-12-30 | End: 2018-12-30

## 2018-12-30 RX ORDER — ROCURONIUM BROMIDE 10 MG/ML
60 INJECTION, SOLUTION INTRAVENOUS ONCE
Status: DISCONTINUED | OUTPATIENT
Start: 2018-12-30 | End: 2018-12-30

## 2018-12-30 RX ADMIN — MAGNESIUM SULFATE HEPTAHYDRATE 2 G: 40 INJECTION, SOLUTION INTRAVENOUS at 00:02

## 2018-12-30 NOTE — ED NOTES
INFUSING UNIT , AND 2ND UNIT OF EMERGENT BLOOD GIVEN TO FLIGHT CREW FOR INFUSION DURING FLIGHT       Zakia Mondragon RN  12/30/18 3079

## 2018-12-30 NOTE — RESPIRATORY THERAPY NOTE
RN called stat to Trauma bay; per  Dr Corinne BERRIOS  For Vapotherm  Application NOW   See the written copy of this report in the patient's paper medical record  These results did not interface directly into the electronic medical record and are summarized here  Flow sheet for settings

## 2018-12-30 NOTE — ED PROVIDER NOTES
History  Chief Complaint   Patient presents with    Weakness - Generalized     Patient is a 25-year-old male with a history of renal failure status post transplant in 2001 at [address] [place] [address] (history of 2 transplants 1 in 1988 and 1 in 2001), chronic immunosuppression, diabetes type 1, hypertension, cardiac arrest February 2018, coronary artery disease status post 2 stents, chronic right osteomyelitis of the ankle followed at [facility] status post multiple surgeries in today complaining of hematemesis  Patient's wife at bedside helps provide history as well  Patient was just discharged from [facility] after they found an abscess at the chronic osteomyelitis site on his right foot  (patient had Charcot deformity that was repaired at [address] [place] [address] and current chronic osteomyelitis since 2018 in August)  Last week, Patient was initially seen here in the emergency department then transferred [facility], then subsequently [facility] to [address] [place] [address]  Patient was found to be in DKA in associated with sepsis due to his abscess  Patient did have an EGD completed while at [facility] due to drop in 2 g of hemoglobin  There was a questionable mass on EGD that was very friable and not removed or biopsied due to his anticoagulation  Patient has been on PPIs ever since then  Patient was subsequently transferred to [facility] for his abscess in his right foot  It was found that patient had MSSA bacteremia from this and was been placed on antibiotics via PICC  Patient did receive his antibiotics by his wife prior to coming into the emergency department  According to wife, patient was discharged this morning  On the way home he started becoming more lethargic and tired  She thought was due to traveling and the multiple facilities  Once upon arriving at home patient started having a bowel movement    They were nonbloody without any melena  He was not complaining of any pain just been severe tiredness  He then had multiple episodes of bright red blood of vomit  There is no coffee-ground emesis  EMS was called  Per EMS, initial blood pressure was less than 80  Id given permission to use the PICC line for small fluid bolus  Patient was subsequently brought to this hospital for further evaluation  He denies any chest pain, shortness of breath, fevers or chills  He is receiving chronic IV antibiotics was pick for his MSSA bacteremia  He does feels weak and tired  He has no cough or hemoptysis  Upon initial exam patient is very pale and cachectic and older appearing  Hypotensive, hypothermic, and appears overloaded  Will give Solu-Cortef presumed adrenal crisis given patient is prednisone dependent  Will give gentle IVF as patient is appears overloaded  He appears pale and intermittently confused  History provided by:  EMS personnel, patient and caregiver   used: No    Vomiting   Severity:  Moderate  Timing:  Intermittent  Quality:  Bright red blood  Able to tolerate:  Liquids and solids  Progression:  Unchanged  Chronicity:  New  Recent urination:  Normal  Context: not post-tussive and not self-induced    Relieved by:  Nothing  Worsened by:  Nothing  Ineffective treatments:  None tried  Associated symptoms: abdominal pain and fever    Associated symptoms: no arthralgias, no chills, no cough, no diarrhea, no headaches, no myalgias, no sore throat and no URI    Risk factors: diabetes    Risk factors: no alcohol use, no prior abdominal surgery, no sick contacts, no suspect food intake and no travel to endemic areas        Prior to Admission Medications   Prescriptions Last Dose Informant Patient Reported? Taking?    Heparin Sodium, Porcine, (HEPARIN, PORCINE,) 5,000 units/mL   No No   Sig: Inject 1 mL (5,000 Units total) under the skin every 8 (eight) hours   Lactobacillus (ACIDOPHILUS PO)   Yes No   Sig: Take 300 mg by mouth   NIFEdipine (PROCARDIA) 10 mg capsule   Yes No   Sig: Take 10 mg by mouth daily   aspirin (ECOTRIN LOW STRENGTH) 81 mg EC tablet   Yes No   Sig: Take 81 mg by mouth daily   atorvastatin (LIPITOR) 40 mg tablet   Yes No   Sig: Take 40 mg by mouth daily   calcitriol (ROCALTROL) 0 5 MCG capsule   Yes No   Sig: Take 0 5 mcg by mouth daily   insulin detemir (LEVEMIR) 100 units/mL subcutaneous injection   Yes No   Sig: Inject 10 Units under the skin 2 (two) times a day   insulin lispro (HUMALOG) 100 units/mL injection   No No   Sig: Inject 30 Units under the skin daily   predniSONE 5 mg tablet   Yes No   Sig: Take 5 mg by mouth daily   sodium bicarbonate 650 mg tablet   No No   Sig: Take 1 tablet by mouth 2 (two) times a day   sodium chloride   No No   Sig: Infuse 125 mL/hr into a venous catheter continuous      Facility-Administered Medications: None       Past Medical History:   Diagnosis Date    Bacteremia 12/21/2018    Cardiac arrest ([facility] )     Diabetes mellitus ([facility] )     Diabetes mellitus type 1 ([facility] )     Hypertension     Infection at site of external fixator pin ([facility] )     MI (myocardial infarction) ([facility] )     Pneumonia     Last Assessed [id number]    Renal failure     Renal transplant, status post 07/21/2007       Past Surgical History:   Procedure Laterality Date    ANKLE FRACTURE SURGERY      ANKLE HARDWARE REMOVAL Right 7/31/2017    Procedure: REMOVAL HARDWARE ANKLE;  Surgeon: Esperanza Lu MD;  Location: [facility];  Service: Orthopedics    ANKLE HARDWARE REMOVAL Right 8/17/2017    Procedure: TIBIA FAILED HARDWARE REMOVAL;  Surgeon: Esperanza Lu MD;  Location: [facility];  Service: Orthopedics    CARDIAC SURGERY      2 stents    CLOSED REDUCTION ANKLE Right 7/3/2017    Procedure: CLOSED REDUCTION DISTAL TIB-FIB AND CASTING VS;  Surgeon: Esperanza Lu MD;  Location: [facility];  Service: Orthopedics    ESOPHAGOGASTRODUODENOSCOPY N/A 12/20/2018    Procedure: ESOPHAGOGASTRODUODENOSCOPY (EGD) in ICU; Surgeon: Mic Jimenez MD;  Location: [facility]; Service: Gastroenterology    EYE SURGERY      FRACTURE SURGERY      ORIF Rt Ankle    GLUTAMIC ACID DECARBOXYLASE (HISTORICAL)      IR PICC LINE  8/20/2018    IR VENOUS LINE REMOVAL  10/5/2018    NEPHRECTOMY TRANSPLANTED ORGAN      DC OPEN TREATMENT FRACTURE DISTAL TIBIA FIBULA Right 7/3/2017    Procedure: OPEN REDUCTION W/ INTERNAL FIXATION (ORIF); Surgeon: Mike Escobar MD;  Location: [facility];  Service: Orthopedics    TOE AMPUTATION Right 10/27/2016    Procedure: AMPUTATION TOE;  Surgeon: Sai Lambert DPM;  Location: [facility];  Service:        Family History   Problem Relation Age of Onset    Diabetes Brother     Coronary artery disease Mother      I have reviewed and agree with the history as documented  Social History   Substance Use Topics    Smoking status: Never Smoker    Smokeless tobacco: Never Used    Alcohol use No        Review of Systems   Constitutional: Positive for fatigue and fever  Negative for chills and diaphoresis  HENT: Negative for ear pain and sore throat  Eyes: Negative for visual disturbance  Respiratory: Negative for cough, chest tightness and shortness of breath  Cardiovascular: Negative for chest pain and palpitations  Gastrointestinal: Positive for abdominal pain and vomiting  Negative for diarrhea  Hematemesis   Endocrine: Positive for cold intolerance  Genitourinary: Negative for difficulty urinating  Musculoskeletal: Negative for arthralgias, back pain, myalgias and neck pain  Skin: Negative for rash  Neurological: Negative for weakness and headaches  Psychiatric/Behavioral: Negative for confusion  All other systems reviewed and are negative  Physical Exam  Physical Exam   Constitutional: He is oriented to person, place, and time  He has a sickly appearance  Patient appears pale  Older than stated age    Diaphoretic get cold to touch   HENT:   Head: Normocephalic and atraumatic  Right Ear: Hearing and external ear normal    Left Ear: Hearing and external ear normal    Nose: Nose normal    Mouth/Throat: Uvula is midline and oropharynx is clear and moist  Mucous membranes are dry  Patient maintaining airway maintaining secretions   Eyes: Pupils are equal, round, and reactive to light  EOM are normal    Pale conjunctiva bilaterally   Neck: Trachea normal and normal range of motion  Neck supple  Cardiovascular: Normal rate, regular rhythm, normal heart sounds and intact distal pulses  No murmur heard  Pulmonary/Chest: Effort normal  No stridor  Tachypnea noted  No respiratory distress  He has rales (At bases  Pursed lip breathing)  Abdominal: Soft  He exhibits no distension and no mass  Bowel sounds are increased  There is no tenderness  There is no guarding  Musculoskeletal: Normal range of motion  He exhibits no edema (1+ bilateral upper extremity  Pending  1+ bilateral lower extremity pitting edema  )  Legs:  Neurological: He is alert and oriented to person, place, and time  No cranial nerve deficit  Skin: Skin is dry  Capillary refill takes less than 2 seconds  He is not diaphoretic  There is pallor  Pale appearance  Mildly diaphoretic  No rashes  Nursing note and vitals reviewed        Vital Signs  ED Triage Vitals   Temperature Pulse Respirations Blood Pressure SpO2   12/29/18 2356 12/29/18 2307 12/29/18 2307 12/29/18 2307 12/29/18 2307   (!) 94 °F (34 4 °C) 90 20 (!) 87/52 92 %      Temp Source Heart Rate Source Patient Position - Orthostatic VS BP Location FiO2 (%)   12/29/18 2307 12/29/18 2307 -- 12/29/18 2307 12/29/18 2300   Oral Monitor  Left arm 2      Pain Score       --                  Vitals:    12/30/18 0030 12/30/18 0043 12/30/18 0100 12/30/18 0222   BP: 96/54 98/56 101/59 106/62   Pulse: 81 88 86 85       Visual Acuity      ED Medications  Medications   pantoprazole (PROTONIX) 80 mg in sodium chloride 0 9 % 100 mL IVPB (80 mg Intravenous Handoff 12/30/18 0100)   pantoprazole (PROTONIX) 80 mg in sodium chloride 0 9 % 100 mL infusion (8 mg/hr Intravenous Handoff 12/30/18 0100)   magnesium sulfate 2 g/50 mL IVPB (premix) 2 g (2 g Intravenous New Bag 12/30/18 0002)   ROCuronium (ZEMURON) injection 50 mg (not administered)   vancomycin (VANCOCIN) 500 mg in sodium chloride 0 9 % 100 mL IVPB (500 mg Intravenous Handoff 12/30/18 0100)   hydrocortisone sodium succinate (PF) (Solu-CORTEF) injection 200 mg (200 mg Intravenous Given 12/29/18 2344)       Diagnostic Studies  Results Reviewed     Procedure Component Value Units Date/Time    Acetone [[id number]]  (Normal) Collected:  12/29/18 2330    Lab Status:  Final result Specimen:  Blood from Arm, Right Updated:  12/30/18 0027     Acetone, Bld Negative    Troponin I [[id number]]  (Abnormal) Collected:  12/29/18 2330    Lab Status:  Final result Specimen:  Blood from Arm, Right Updated:  12/30/18 0023     Troponin I 0 11 (HH) ng/mL     Lactic acid, plasma [[id number]]  (Abnormal) Collected:  12/29/18 2330    Lab Status:  Final result Specimen:  Blood from Arm, Right Updated:  12/30/18 0023     LACTIC ACID 6 3 (HH) mmol/L     Narrative:         Result may be elevated if tourniquet was used during collection      Comprehensive metabolic panel [[id number]]  (Abnormal) Collected:  12/29/18 2330    Lab Status:  Final result Specimen:  Blood from Arm, Right Updated:  12/30/18 0017     Sodium 135 (L) mmol/L      Potassium 3 5 mmol/L      Chloride 101 mmol/L      CO2 19 (L) mmol/L      ANION GAP 15 (H) mmol/L      BUN 60 (H) mg/dL      Creatinine 4 37 (H) mg/dL      Glucose 262 (H) mg/dL      Calcium 7 0 (L) mg/dL      AST 20 U/L      ALT <6 (L) U/L      Alkaline Phosphatase 120 (H) U/L      Total Protein 4 0 (L) g/dL      Albumin 1 2 (L) g/dL      Total Bilirubin 0 10 (L) mg/dL      eGFR 15 ml/min/1 73sq m     Narrative:         National Kidney Disease Education Program recommendations are as follows:  GFR calculation is accurate only with a steady state creatinine  Chronic Kidney disease less than 60 ml/min/1 73 sq  meters  Kidney failure less than 15 ml/min/1 73 sq  meters      Phosphorus [[id number]]  (Abnormal) Collected:  12/29/18 2330    Lab Status:  Final result Specimen:  Blood from Arm, Right Updated:  12/30/18 0017     Phosphorus 5 2 (H) mg/dL     Magnesium [[id number]]  (Abnormal) Collected:  12/29/18 2330    Lab Status:  Final result Specimen:  Blood from Arm, Right Updated:  12/30/18 0017     Magnesium 1 5 (L) mg/dL     Blood gas, venous [[id number]]  (Abnormal) Collected:  12/29/18 2330    Lab Status:  Final result Specimen:  Blood from Arm, Right Updated:  12/30/18 0015     pH, Mio 7 288 (L)     pCO2, Mio 37 0 (L) mm Hg      pO2, Mio 41 5 mm Hg      HCO3, Mio 17 3 (L) mmol/L      Base Excess, Mio -8 5 mmol/L      O2 Content, Mio 3 6 ml/dL      O2 HGB, VENOUS 68 4 %     CK (with reflex to MB) [[id number]]  (Normal) Collected:  12/29/18 2330    Lab Status:  Final result Specimen:  Blood from Arm, Right Updated:  12/30/18 0006     Total CK 60 U/L     CBC and differential [[id number]]  (Abnormal) Collected:  12/29/18 2330    Lab Status:  Final result Specimen:  Blood from Arm, Right Updated:  12/29/18 2357     WBC 22 86 (H) Thousand/uL      RBC 1 01 (L) Million/uL      Hemoglobin 3 0 (LL) g/dL      Hematocrit 9 7 (L) %      MCV 96 fL      MCH 29 7 pg      MCHC 30 9 (L) g/dL      RDW 14 2 %      MPV 10 1 fL      Platelets 541 Thousands/uL      nRBC 0 /100 WBCs      Neutrophils Relative 87 (H) %      Immat GRANS % 2 %      Lymphocytes Relative 6 (L) %      Monocytes Relative 5 %      Eosinophils Relative 0 %      Basophils Relative 0 %      Neutrophils Absolute 19 81 (H) Thousands/µL      Immature Grans Absolute 0 50 (H) Thousand/uL      Lymphocytes Absolute 1 30 Thousands/µL      Monocytes Absolute 1 14 Thousand/µL      Eosinophils Absolute 0 08 Thousand/µL      Basophils Absolute 0 03 Thousands/µL     APTT [[id number]]  (Abnormal) Collected:  12/29/18 2330    Lab Status:  Final result Specimen:  Blood from Arm, Right Updated:  12/29/18 2355     PTT 47 (H) seconds     Protime-INR [[id number]]  (Abnormal) Collected:  12/29/18 2330    Lab Status:  Final result Specimen:  Blood from Arm, Right Updated:  12/29/18 2355     Protime 17 7 (H) seconds      INR 1 53 (H)    Procalcitonin [[id number]] Collected:  12/29/18 2330    Lab Status:   In process Specimen:  Blood from Arm, Right Updated:  12/29/18 2339    Fingerstick Glucose (POCT) [[id number]]  (Abnormal) Collected:  12/29/18 2314    Lab Status:  Final result Updated:  12/29/18 2315     POC Glucose 350 (H) mg/dl     UA w Reflex to Microscopic [[id number]]     Lab Status:  No result Specimen:  Urine     Blood culture #1 [[id number]]     Lab Status:  No result Specimen:  Blood     Blood culture #2 [[id number]]     Lab Status:  No result Specimen:  Blood     Urine culture [[id number]]     Lab Status:  No result Specimen:  Urine from Urine, Other                  XR chest 1 view portable    (Results Pending)              Procedures  CriticalCare Time  Performed by: Liset Best  Authorized by: Liset Best     Critical care provider statement:     Critical care time (minutes):  60    Critical care was necessary to treat or prevent imminent or life-threatening deterioration of the following conditions:  Cardiac failure, circulatory failure, shock, sepsis, dehydration, endocrine crisis, renal failure and respiratory failure    Critical care was time spent personally by me on the following activities:  Blood draw for specimens, obtaining history from patient or surrogate, development of treatment plan with patient or surrogate, discussions with consultants, discussions with primary provider, evaluation of patient's response to treatment, examination of patient, review of old charts, re-evaluation of patient's condition, ordering and review of radiographic studies, ordering and review of laboratory studies and ordering and performing treatments and interventions           Phone Contacts  ED Phone Contact    ED Course                               MDM  Number of Diagnoses or Management Options  Anemia:   Chronic kidney disease, stage 4 (severe) ([facility] ):   Elevated troponin:   GI bleed:   Hypomagnesemia:   Hypotensive episode:   Hypothermic shock:   Lethargic:   Leukocytosis:   Diagnosis management comments:   Patient is a 62-year-old gentleman discharge from [place] in [place] today  Well known to this facility  He has complicated comorbidities including recent sepsis due to MSSA infection due to his abscess and near his chronic right osteomyelitis on his right foot way had multiple surgeries, diabetes type 1, coronary artery disease status post stents after cardiopulmonary arrest, as well as multiple renal transplant  Coming in today with hematemesis  On initial exam he appears pale, lethargic hypotensive and mildly tachypneic  Initially patient was put on Vapotherm for support  This did decrease his work of breathing  Will also give hydro cortef as patient is steroid dependent  Will slowly give IV fluids as well as magnesium due to QTC prolongation on EKG  Will also obtain labs a CBC, CMP, Mag, acetone, VBG, blood cultures, chest x-ray, EKG, troponin, CK  Upon initial evaluation with patient as well as family discussed with them that we have no unit beds available as well as no beds in the hospital   Given his extensive past medical history and recent discharge will need to be transferred to [address] [place] [address]  Transfer order was placed  Family at bedside updated regarding an EGD that was completed at [address] after his admission at [address] [place] [address]    Patient states wife states that "I was told that the EGD found a mass on as esophagus but they could removed or biopsied because it was too friable and caused as bleeding"  Initially patient was having melena in the hospital; however, has not been having melena according to wife    EKG INTERPRETATION at 11:08 p m  RHYTHM:  Normal sinus rhythm at 90 beats per minute  AXIS:  Normal axis  INTERVALS:  AL interval measured at 120 milliseconds  QRS COMPLEX:  QRS measured at 106 milliseconds  ST SEGMENT:  Nonspecific ST segment changes  Diffuse T-wave flattening as well as diffuse ST depression  QT INTERVAL:  QTC measured at 507 millisecond  COMPARED WITH PRIOR no significant change compared to old  Interpretation by Anirudh Gregory DO    11:15 PM  PACS called  Pending call from [facility]  Will discussed with attending on call  I discussed with him that at this time as his history and physical exam   We have a chest x-ray and EKG as well as a fingerstick glucose  We have no laboratory data back family updated and agreeable    11:23 PM  Discussed Dr Zain Mcwilliams from [facility] will call over [facility] for further ICU care  Patient does have a temperature of 94° axillary as no oral temperature was reading  Will refrain from rectal as patient is immunosuppressed and has been septic over the past several weeks  Patient was placed on a [facility]  Will continue give gentle IV fluids as well as Solu-Cortef  11:39 PM  Dr Zain Mcwilliams - from [facility] accepts patient wishes me to go of a call to the physician in-house Dr Blackmon Section ) Lavinia Leader , [facility] hospitalist  [id number] when patient leaves  I discussed with them that this time we still no laboratory data back  We are degree and that patient will need further and higher demands that we cannot obtain at this facility  12:02 AM  Labs resulted hemoglobin of 3  Call blood bank and will have emergent blood released  Patient is unstable will need blood immediately  I did have wife sign blood consent and she understands  Wife states that he has not been having any melena or bright red blood per rectum as she checks his stools    He had multiple episodes of large amount of blood by vomiting  There is no hemoptysis  12:17 AM  Patient was having intermittent waxing and waning periods of difficult to arouse  Patient was sat upright and has been maintaining airway and secretions  He is alert oriented x3 and been able to have a conversation about the [employer] football game today  This has been ongoing for 10-15 minutes  I discussed with patient and family that his his status with decline if you want intubation  His wishes are that he would want intubation  Again at this time no intubation is required  Will continue with blood transfusion as well they are aware that will be [facility] helicopter  [facility]    12[facility]23 AM  Patient's troponin has resulted at 0 11  Given patient's GI bleed with significant hemoglobin drop will refrain from aspirin at this time  His aspirin and Plavix this morning per report of family  Patient's lactic is 6 3  He was given IV fluids slowly by EMS and will continue to slowly give IV fluids here  Long conversation with family regarding this as well  There where no heparin and/or aspirin or Plavix will be giving due to his severe anemia from her presumed GI bleeding  He also aware that this trend is decreasing and will continue to trend his troponin  12:57 AM  Reviewed patient's labs with patient as well as family    NEUROLOGIC:  Patient is alert oriented x3, patient has full capacity to move bilateral upper lower extremities independently of each other  There is no focal deficit  NIH 0    CARDIOVASCULAR:  Patient has prolonged QTC and will give magnesium  Will refrain from QTC prolonging medications  Troponin at 0 11 in which is trending downward from last week  However will need to continue the strands since this is new admission  He does have a significant history of coronary artery disease with stenting    Plavix and aspirin will need to be held and was not given here despite his elevated troponin due to hemoglobin of 3 as well as GI bleeding  PULMONARY:  Patient initially was tachypneic with mild increased work of breathing  He is on Vapotherm and responding well  Chest x-ray without overload or CHF  GASTROINTESTINAL:  Hemoglobin of 3 with hematemesis at home  There is known friable mass per EGD  Will refrain from Plavix and aspirin  Protonix bolus and IV drip ordered  Will keep patient NPO as well  ENDOCRINE:  Patient's glucose is greater than 200 with an anion gap of 16  However, acetone is negative with noted elevated lactate     Will continue following trend  Patient is known brittle diabetic and has been DKA multiple times requiring insulin infusions  HEMATOLOGY-ONCOLOGY:  Patient does have a hemoglobin of 3 with noted leukocytosis  Patient's hemoglobin 3 is presumed to be due for is friable bleeding mass in this is soft Dank Tolbert found on EGD  Platelets are stable  RENAL:  Patient with known 2 renal transplants with last 1 in 2001  He is on multiple immunosuppressants  His renal function is around his baseline of 4 creatinine  Will continue to trend observe as well as follow I and O's    Orthopedics:  Right lower extremity in brace  WILL NEED FOLLOW-UP ONCE KENNETH    INFECTIOUS DISEASE:  Elevated leukocytosis as well as lactic acidosis  Did order vancomycin and blood cultures  Patient has known history of emesis a most recently  Will need to continue IV antibiotics while in the hospital   Chest x-ray without acute pathology  1:02 AM  Reviewed call Dr Meryle Nares from [facility]  We discussed case and reviewed labs as well as vital signs  Answered all questions and reviewed EGD that was performed by Dr Windy Alcantara at [address] of the record may have been created with voice recognition software   Occasional wrong word or "sound a like" substitutions may have occurred due to the inherent limitations of voice recognition software  Read the chart carefully and recognize, using context, where substitutions have occurred  Amount and/or Complexity of Data Reviewed  Clinical lab tests: ordered and reviewed  Tests in the radiology section of CPT®: ordered and reviewed  Tests in the medicine section of CPT®: ordered and reviewed  Independent visualization of images, tracings, or specimens: yes      CritCare Time    Disposition  Final diagnoses:   Anemia   Hypotensive episode   Chronic kidney disease, stage 4 (severe) (HCC)   Hypomagnesemia   Lethargic   Leukocytosis   Hypothermic shock   GI bleed   Elevated troponin     Time reflects when diagnosis was documented in both MDM as applicable and the Disposition within this note     Time User Action Codes Description Comment    12/30/2018  2:32 AM Mai Amelie Add [D64 9] Anemia     12/30/2018  2:32 AM Bendock, Rancho mirage L Add [I95 9] Hypotensive episode     12/30/2018  2:32 AM Bendock, [place]o mirage L Add [N18 4] Chronic kidney disease, stage 4 (severe) ([address] )     12/30/2018  2:32 AM Bendock, Lorena L Add [E83 42] Hypomagnesemia     12/30/2018  2:33 AM Mai Amelie Add [R53 83] Lethargic     12/30/2018  2:33 AM Mai Amelie Add [D72 829] Leukocytosis     12/30/2018  2:33 AM Bendock, Patrizia Boards Add [facility]  XXXA] Hypothermic shock     12/30/2018  2:33 AM Bendock, Lorena L Add [K92 2] GI bleed     12/30/2018  2:33 AM Bendock, [place]o mirage L Add [R74 8] Elevated troponin       ED Disposition     ED Disposition Condition Comment    Transfer to Replaced by [facility] should be transferred out to [address] [place] [address]        MD Documentation      Most Recent Value   Patient Condition  The patient has been stabilized such that within reasonable medical probability, no material deterioration of the patient condition or the condition of the unborn child(tiesha) is likely to result from the transfer   Reason for Transfer  Level of Care needed not available at this facility, No bed available at level of patient's needs   Benefits of Transfer  Patient preference, Specialized equipment and/or services available at the receiving facility (Include comment)________________________   Risks of Transfer  Potential for delay in receiving treatment, Potential deterioration of medical condition, Loss of IV, Increased discomfort during transfer, Possible worsening of condition or death during transfer   Provider Certification  General risk, such as traffic hazards, adverse weather conditions, rough terrain or turbulence, possible failure of equipment (including vehicle or aircraft), or consequences of actions of persons outside the control of the transport personnel      Follow-up Information    None         Discharge Medication List as of 12/30/2018  2:24 AM      CONTINUE these medications which have NOT CHANGED    Details   aspirin (ECOTRIN LOW STRENGTH) 81 mg EC tablet Take 81 mg by mouth daily, Historical Med      atorvastatin (LIPITOR) 40 mg tablet Take 40 mg by mouth daily, Historical Med      calcitriol (ROCALTROL) 0 5 MCG capsule Take 0 5 mcg by mouth daily, Historical Med      Heparin Sodium, Porcine, (HEPARIN, PORCINE,) 5,000 units/mL Inject 1 mL (5,000 Units total) under the skin every 8 (eight) hours, Starting Wed 12/19/2018, No Print      insulin detemir (LEVEMIR) 100 units/mL subcutaneous injection Inject 10 Units under the skin 2 (two) times a day, Historical Med      insulin lispro (HUMALOG) 100 units/mL injection Inject 30 Units under the skin daily, Starting Wed 5/30/2018, Normal      Lactobacillus (ACIDOPHILUS PO) Take 300 mg by mouth, Historical Med      NIFEdipine (PROCARDIA) 10 mg capsule Take 10 mg by mouth daily, Historical Med      predniSONE 5 mg tablet Take 5 mg by mouth daily, Historical Med      sodium bicarbonate 650 mg tablet Take 1 tablet by mouth 2 (two) times a day, Starting u 10/26/2017, Print      sodium chloride Infuse 125 mL/hr into a venous catheter continuous, Starting Wed 12/19/2018, No Print           No discharge procedures on file      ED Provider  Electronically Signed by           ZALP, DO  12/30/18 [id number]

## 2018-12-30 NOTE — EMTALA/ACUTE CARE TRANSFER
[address] [facility] [address]  [address] [facility] [address]  Dept: [phone]      EEZQKP TRANSFER CONSENT    NAME Baron Hernandez DOB 1970                              MRN [id number]    I have been informed of my rights regarding examination, treatment, and transfer   by Dr Jerrell Seals DO    Benefits: Patient preference, Specialized equipment and/or services available at the receiving facility (Include comment)________________________    Risks: Potential for delay in receiving treatment, Potential deterioration of medical condition, Loss of IV, Increased discomfort during transfer, Possible worsening of condition or death during transfer      Consent for Transfer:  I acknowledge that my medical condition has been evaluated and explained to me by the emergency department physician or other qualified medical person and/or my attending physician, who has recommended that I be transferred to the service of    at    The above potential benefits of such transfer, the potential risks associated with such transfer, and the probable risks of not being transferred have been explained to me, and I fully understand them  The doctor has explained that, in my case, the benefits of transfer outweigh the risks  I agree to be transferred  I authorize the performance of emergency medical procedures and treatments upon me in both transit and upon arrival at the receiving facility  Additionally, I authorize the release of any and all medical records to the receiving facility and request they be transported with me, if possible  I understand that the safest mode of transportation during a medical emergency is an ambulance and that the Hospital advocates the use of this mode of transport   Risks of traveling to the receiving facility by car, including absence of medical control, life sustaining equipment, such as oxygen, and medical personnel has been explained to me and I fully understand them  (LARISSA CORRECT BOX BELOW)  [  ]  I consent to the stated transfer and to be transported by ambulance/helicopter  [  ]  I consent to the stated transfer, but refuse transportation by ambulance and accept full responsibility for my transportation by car  I understand the risks of non-ambulance transfers and I exonerate the Hospital and its staff from any deterioration in my condition that results from this refusal     X___________________________________________    DATE  18  TIME________  Signature of patient or legally responsible individual signing on patient behalf           RELATIONSHIP TO PATIENT_________________________          Provider Certification    NAME Maxi Barnett                                        [facility] 1970                              MRN [id number]    A medical screening exam was performed on the above named patient  Based on the examination:    Condition Necessitating Transfer There were no encounter diagnoses  Patient Condition: The patient has been stabilized such that within reasonable medical probability, no material deterioration of the patient condition or the condition of the unborn child(tiesha) is likely to result from the transfer    Reason for Transfer: Level of Care needed not available at this facility, No bed available at level of patient's needs    Transfer Requirements: Facility     · Space available and qualified personnel available for treatment as acknowledged by    · Agreed to accept transfer and to provide appropriate medical treatment as acknowledged by          · Appropriate medical records of the examination and treatment of the patient are provided at the time of transfer   [address] _______  · Transfer will be performed by qualified personnel from    and appropriate transfer equipment as required, including the use of necessary and appropriate life support measures      Provider Certification: I have examined the patient and explained the following risks and benefits of being transferred/refusing transfer to the patient/family:  General risk, such as traffic hazards, adverse weather conditions, rough terrain or turbulence, possible failure of equipment (including vehicle or aircraft), or consequences of actions of persons outside the control of the transport personnel      Based on these reasonable risks and benefits to the patient and/or the unborn child(tiesha), and based upon the information available at the time of the patients examination, I certify that the medical benefits reasonably to be expected from the provision of appropriate medical treatments at another medical facility outweigh the increasing risks, if any, to the individuals medical condition, and in the case of labor to the unborn child, from effecting the transfer      X____________________________________________ DATE 12/29/18        TIME_______      ORIGINAL - SEND TO MEDICAL RECORDS   COPY - SEND WITH PATIENT DURING TRANSFER

## 2018-12-31 DIAGNOSIS — D63.1 ANEMIA DUE TO CHRONIC KIDNEY DISEASE, UNSPECIFIED CKD STAGE: Primary | ICD-10-CM

## 2018-12-31 DIAGNOSIS — N18.9 ANEMIA DUE TO CHRONIC KIDNEY DISEASE, UNSPECIFIED CKD STAGE: Primary | ICD-10-CM

## 2018-12-31 LAB
ABO GROUP BLD BPU: NORMAL
ABO GROUP BLD BPU: NORMAL
ATRIAL RATE: 92 BPM
BPU ID: NORMAL
BPU ID: NORMAL
CROSSMATCH: NORMAL
CROSSMATCH: NORMAL
P AXIS: 78 DEGREES
PR INTERVAL: 120 MS
QRS AXIS: 41 DEGREES
QRSD INTERVAL: 106 MS
QT INTERVAL: 410 MS
QTC INTERVAL: 507 MS
T WAVE AXIS: 181 DEGREES
UNIT DISPENSE STATUS: NORMAL
UNIT DISPENSE STATUS: NORMAL
UNIT PRODUCT CODE: NORMAL
UNIT PRODUCT CODE: NORMAL
UNIT RH: NORMAL
UNIT RH: NORMAL
VENTRICULAR RATE: 92 BPM

## 2018-12-31 PROCEDURE — 93010 ELECTROCARDIOGRAM REPORT: CPT | Performed by: INTERNAL MEDICINE

## 2019-01-02 LAB
BACTERIA BLD CULT: ABNORMAL
BACTERIA BLD CULT: ABNORMAL
GRAM STN SPEC: ABNORMAL

## 2019-01-16 ENCOUNTER — TRANSCRIBE ORDERS (OUTPATIENT)
Dept: LAB | Facility: CLINIC | Age: 49
End: 2019-01-16

## 2019-01-16 ENCOUNTER — TRANSITIONAL CARE MANAGEMENT (OUTPATIENT)
Dept: FAMILY MEDICINE CLINIC | Facility: CLINIC | Age: 49
End: 2019-01-16

## 2019-01-16 ENCOUNTER — LAB (OUTPATIENT)
Dept: LAB | Facility: CLINIC | Age: 49
End: 2019-01-16
Payer: COMMERCIAL

## 2019-01-16 DIAGNOSIS — M86.161 ACUTE OSTEOMYELITIS OF RIGHT LOWER LEG (HCC): Primary | ICD-10-CM

## 2019-01-16 DIAGNOSIS — Z79.2 ENCOUNTER FOR LONG-TERM (CURRENT) USE OF ANTIBIOTICS: ICD-10-CM

## 2019-01-16 DIAGNOSIS — M86.161 ACUTE OSTEOMYELITIS OF RIGHT LOWER LEG (HCC): ICD-10-CM

## 2019-01-16 DIAGNOSIS — R79.81 ABNORMAL ARTERIAL BLOOD GASES: ICD-10-CM

## 2019-01-16 DIAGNOSIS — B95.61 STAPHYLOCOCCUS AUREUS SUPERFICIAL FOLLICULITIS: ICD-10-CM

## 2019-01-16 DIAGNOSIS — L73.9 STAPHYLOCOCCUS AUREUS SUPERFICIAL FOLLICULITIS: ICD-10-CM

## 2019-01-16 DIAGNOSIS — T84.622A INFLAMMATORY REACTION DUE TO INTERNAL FIXATION DEVICE OF RIGHT TIBIA, INITIAL ENCOUNTER (HCC): ICD-10-CM

## 2019-01-16 LAB
ERYTHROCYTE [DISTWIDTH] IN BLOOD BY AUTOMATED COUNT: 15.5 % (ref 11.6–15.1)
HCT VFR BLD AUTO: 24.3 % (ref 36.5–49.3)
HGB BLD-MCNC: 7.9 G/DL (ref 12–17)
MCH RBC QN AUTO: 29.9 PG (ref 26.8–34.3)
MCHC RBC AUTO-ENTMCNC: 32.5 G/DL (ref 31.4–37.4)
MCV RBC AUTO: 92 FL (ref 82–98)
PLATELET # BLD AUTO: 382 THOUSANDS/UL (ref 149–390)
PMV BLD AUTO: 9.9 FL (ref 8.9–12.7)
RBC # BLD AUTO: 2.64 MILLION/UL (ref 3.88–5.62)
WBC # BLD AUTO: 10.49 THOUSAND/UL (ref 4.31–10.16)

## 2019-01-16 PROCEDURE — 85027 COMPLETE CBC AUTOMATED: CPT

## 2019-01-16 PROCEDURE — 36415 COLL VENOUS BLD VENIPUNCTURE: CPT

## 2019-01-17 ENCOUNTER — OFFICE VISIT (OUTPATIENT)
Dept: FAMILY MEDICINE CLINIC | Facility: CLINIC | Age: 49
End: 2019-01-17
Payer: COMMERCIAL

## 2019-01-17 VITALS
SYSTOLIC BLOOD PRESSURE: 110 MMHG | TEMPERATURE: 99.6 F | WEIGHT: 128 LBS | DIASTOLIC BLOOD PRESSURE: 58 MMHG | HEIGHT: 64 IN | BODY MASS INDEX: 21.85 KG/M2

## 2019-01-17 DIAGNOSIS — N18.4 CHRONIC KIDNEY DISEASE, STAGE IV (SEVERE) (HCC): ICD-10-CM

## 2019-01-17 DIAGNOSIS — R89.9 ABNORMAL LABORATORY TEST RESULT: ICD-10-CM

## 2019-01-17 DIAGNOSIS — E10.22 TYPE 1 DIABETES MELLITUS WITH STAGE 4 CHRONIC KIDNEY DISEASE (HCC): ICD-10-CM

## 2019-01-17 DIAGNOSIS — N18.4 CHRONIC KIDNEY DISEASE, STAGE 4 (SEVERE) (HCC): Primary | ICD-10-CM

## 2019-01-17 DIAGNOSIS — D64.9 LOW HEMOGLOBIN: Primary | ICD-10-CM

## 2019-01-17 DIAGNOSIS — D63.8 ANEMIA OF INFECTION AND CHRONIC DISEASE: ICD-10-CM

## 2019-01-17 DIAGNOSIS — N18.4 TYPE 1 DIABETES MELLITUS WITH STAGE 4 CHRONIC KIDNEY DISEASE (HCC): ICD-10-CM

## 2019-01-17 DIAGNOSIS — M86.8X6: ICD-10-CM

## 2019-01-17 DIAGNOSIS — B99.9 ANEMIA OF INFECTION AND CHRONIC DISEASE: ICD-10-CM

## 2019-01-17 PROCEDURE — 3066F NEPHROPATHY DOC TX: CPT | Performed by: FAMILY MEDICINE

## 2019-01-17 PROCEDURE — 99213 OFFICE O/P EST LOW 20 MIN: CPT | Performed by: FAMILY MEDICINE

## 2019-01-17 RX ORDER — TORSEMIDE 20 MG/1
80 TABLET ORAL DAILY
COMMUNITY
End: 2019-02-11 | Stop reason: SDUPTHER

## 2019-01-17 NOTE — PROGRESS NOTES
Please call the patient regarding his abnormal result    Hemoglobin is up to 7 9 not sure who supposed to be following this but I would repeat his hemoglobin again in 2 weeks to make sure it continues to trend upward and that it does not go down

## 2019-01-17 NOTE — PROGRESS NOTES
Assessment/Plan:    No problem-specific Assessment & Plan notes found for this encounter  Diagnoses and all orders for this visit:    Chronic kidney disease, stage 4 (severe) ([facility] )  -     Basic metabolic panel; Future  -     CBC; Future  -     Magnesium; Future    Anemia of infection and chronic disease  -     CBC; Future    Type 1 diabetes mellitus with stage 4 chronic kidney disease (HCC)    Other osteomyelitis of right tibia (HCC)    Chronic kidney disease, stage IV (severe) ([facility] )    Other orders  -     torsemide (DEMADEX) 20 mg tablet; Take 80 mg by mouth daily          Subjective:      Patient ID: Rob Rodriguez is a 51 y o  male  Janchiquise Care is here today for status post treatment at [address] [place] [address] for esophageal ulcer on hemoglobin was 3 he was emergently transfused most recent hemoglobin from yesterday is 7 9 needs weight least a weekly hemoglobin and we also need to do a BMP and a magnesium level because of his kidney function otherwise doing well his mental outlook is good        The following portions of the patient's history were reviewed and updated as appropriate:   He  has a past medical history of Bacteremia (12/21/2018); Cardiac arrest ([facility] ); Diabetes mellitus ([facility] ); Diabetes mellitus type 1 ([facility] ); Hypertension; Infection at site of external fixator pin [facility]); MI (myocardial infarction) ([facility] ); Pneumonia; Renal failure; and Renal transplant, status post (07/21/2007)    He   Patient Active Problem List    Diagnosis Date Noted    Bacteremia 12/21/2018    Stage 4 chronic kidney disease ([facility] ) 12/20/2018    High anion gap metabolic acidosis 26/68/5460    Hyperphosphatemia 12/20/2018    Gastrointestinal hemorrhage 12/20/2018    Severe sepsis ([facility] ) 12/19/2018    Urinary retention 09/24/2017    Chronic kidney disease, stage IV (severe) ([facility] ) 09/23/2017    Chronic osteomyelitis of tibia ([facility] ) 09/23/2017    DKA (diabetic ketoacidoses) ([facility] ) 09/23/2017    Diarrhea 09/23/2017    Cellulitis of ankle 09/23/2017    Non-ST elevation myocardial infarction (NSTEMI), type 2 09/23/2017    Closed fracture of distal end of right tibia with routine healing 07/03/2017    Osteomyelitis ([place] [address] ) 12/07/2016    Poor circulation 12/07/2016    Sepsis ([place] [address] ) 10/27/2016    Acute kidney injury ([place] [address] ) 10/27/2016    Osteomyelitis ([place] [address] ) 10/26/2016    Foot pain 10/26/2016    Type 1 diabetes mellitus ([place] [address] ) 10/26/2016    Renal transplant, status post 10/26/2016    Immunosuppression ([place] [address] ) 11/04/2014    Hypertension 08/04/2010     He  has a past surgical history that includes Nephrectomy transplanted organ; GLUTAMIC ACID DECARBOXYLASE (HISTORICAL); Eye surgery; Toe amputation (Right, 10/27/2016); pr open treatment fracture distal tibia fibula (Right, 7/3/2017); CLOSED REDUCTION ANKLE (Right, 7/3/2017); Ankle fracture surgery; Ankle hardware removal (Right, 7/31/2017); Ankle hardware removal (Right, 8/17/2017); Fracture surgery; Cardiac surgery; IR PICC line (8/20/2018); IR venous line removal (10/5/2018); and Esophagogastroduodenoscopy (N/A, 12/20/2018)  His family history includes Coronary artery disease in his mother; Diabetes in his brother  He  reports that he has never smoked  He has never used smokeless tobacco  He reports that he does not drink alcohol or use drugs    Current Outpatient Prescriptions   Medication Sig Dispense Refill    aspirin (ECOTRIN LOW STRENGTH) 81 mg EC tablet Take 81 mg by mouth daily      atorvastatin (LIPITOR) 40 mg tablet Take 40 mg by mouth daily      calcitriol (ROCALTROL) 0 5 MCG capsule Take 0 5 mcg by mouth daily      Heparin Sodium, Porcine, (HEPARIN, PORCINE,) 5,000 units/mL Inject 1 mL (5,000 Units total) under the skin every 8 (eight) hours 1 mL 0    insulin detemir (LEVEMIR) 100 units/mL subcutaneous injection Inject 10 Units under the skin 2 (two) times a day      insulin lispro (HUMALOG) 100 units/mL injection Inject 30 Units under the skin daily 30 mL 5    Lactobacillus (ACIDOPHILUS PO) Take 300 mg by mouth      NIFEdipine (PROCARDIA) 10 mg capsule Take 10 mg by mouth daily      predniSONE 5 mg tablet Take 5 mg by mouth daily      sodium bicarbonate 650 mg tablet Take 1 tablet by mouth 2 (two) times a day 30 tablet 0    sodium chloride Infuse 125 mL/hr into a venous catheter continuous  0    torsemide (DEMADEX) 20 mg tablet Take 80 mg by mouth daily       No current facility-administered medications for this visit  Current Outpatient Prescriptions on File Prior to Visit   Medication Sig    aspirin (ECOTRIN LOW STRENGTH) 81 mg EC tablet Take 81 mg by mouth daily    atorvastatin (LIPITOR) 40 mg tablet Take 40 mg by mouth daily    calcitriol (ROCALTROL) 0 5 MCG capsule Take 0 5 mcg by mouth daily    Heparin Sodium, Porcine, (HEPARIN, PORCINE,) 5,000 units/mL Inject 1 mL (5,000 Units total) under the skin every 8 (eight) hours    insulin detemir (LEVEMIR) 100 units/mL subcutaneous injection Inject 10 Units under the skin 2 (two) times a day    insulin lispro (HUMALOG) 100 units/mL injection Inject 30 Units under the skin daily    Lactobacillus (ACIDOPHILUS PO) Take 300 mg by mouth    NIFEdipine (PROCARDIA) 10 mg capsule Take 10 mg by mouth daily    predniSONE 5 mg tablet Take 5 mg by mouth daily    sodium bicarbonate 650 mg tablet Take 1 tablet by mouth 2 (two) times a day    sodium chloride Infuse 125 mL/hr into a venous catheter continuous     No current facility-administered medications on file prior to visit  He has No Known Allergies       Review of Systems   Constitutional: Negative for activity change, appetite change, diaphoresis, fatigue and fever  HENT: Negative  Eyes: Negative  Respiratory: Negative for apnea, cough, chest tightness, shortness of breath and wheezing  Cardiovascular: Negative for chest pain, palpitations and leg swelling     Gastrointestinal: Negative for abdominal distention, abdominal pain, anal bleeding, constipation, diarrhea, nausea and vomiting  Endocrine: Negative for cold intolerance, heat intolerance, polydipsia, polyphagia and polyuria  Genitourinary: Negative for difficulty urinating, dysuria, flank pain, hematuria and urgency  Chronic kidney disease stage 4   Musculoskeletal: Negative for arthralgias, back pain, gait problem, joint swelling and myalgias  Skin: Negative for color change, rash and wound  Allergic/Immunologic: Negative for environmental allergies, food allergies and immunocompromised state  Neurological: Negative for dizziness, seizures, syncope, speech difficulty, numbness and headaches  Hematological: Negative for adenopathy  Does not bruise/bleed easily  Psychiatric/Behavioral: Negative for agitation, behavioral problems, hallucinations, sleep disturbance and suicidal ideas  Objective:      /58 (BP Location: Left arm, Patient Position: Sitting, Cuff Size: Standard)   Temp 99 6 °F (37 6 °C) (Tympanic)   Ht 5' 4" (1 626 m)   Wt 58 1 kg (128 lb)   BMI 21 97 kg/m²          Physical Exam   Constitutional: He is oriented to person, place, and time  He appears well-developed and well-nourished  No distress  HENT:   Head: Normocephalic  Right Ear: External ear normal    Left Ear: External ear normal    Nose: Nose normal    Mouth/Throat: Oropharynx is clear and moist    Eyes: Pupils are equal, round, and reactive to light  Conjunctivae and EOM are normal  Right eye exhibits no discharge  Left eye exhibits no discharge  No scleral icterus  Neck: Normal range of motion  No tracheal deviation present  No thyromegaly present  Cardiovascular: Normal rate, regular rhythm and normal heart sounds  Exam reveals no gallop and no friction rub  No murmur heard  Pulmonary/Chest: Effort normal and breath sounds normal  No respiratory distress  He has no wheezes  Abdominal: Soft  Bowel sounds are normal  He exhibits no mass  There is no tenderness  There is no guarding  Musculoskeletal: He exhibits edema  He exhibits no deformity  Dressing right foot   Lymphadenopathy:     He has no cervical adenopathy  Neurological: He is alert and oriented to person, place, and time  No cranial nerve deficit  Skin: Skin is warm and dry  No rash noted  He is not diaphoretic  No erythema  Psychiatric: He has a normal mood and affect   Thought content normal

## 2019-01-23 ENCOUNTER — LAB (OUTPATIENT)
Dept: LAB | Facility: CLINIC | Age: 49
End: 2019-01-23
Payer: COMMERCIAL

## 2019-01-23 ENCOUNTER — TRANSCRIBE ORDERS (OUTPATIENT)
Dept: LAB | Facility: CLINIC | Age: 49
End: 2019-01-23

## 2019-01-23 DIAGNOSIS — N18.4 CHRONIC KIDNEY DISEASE, STAGE 4 (SEVERE) (HCC): ICD-10-CM

## 2019-01-23 DIAGNOSIS — B99.9 ANEMIA OF INFECTION AND CHRONIC DISEASE: ICD-10-CM

## 2019-01-23 DIAGNOSIS — D63.8 ANEMIA OF INFECTION AND CHRONIC DISEASE: ICD-10-CM

## 2019-01-23 LAB
ANION GAP SERPL CALCULATED.3IONS-SCNC: 12 MMOL/L (ref 4–13)
BUN SERPL-MCNC: 75 MG/DL (ref 5–25)
CALCIUM SERPL-MCNC: 8.2 MG/DL (ref 8.3–10.1)
CHLORIDE SERPL-SCNC: 99 MMOL/L (ref 100–108)
CO2 SERPL-SCNC: 19 MMOL/L (ref 21–32)
CREAT SERPL-MCNC: 4.76 MG/DL (ref 0.6–1.3)
ERYTHROCYTE [DISTWIDTH] IN BLOOD BY AUTOMATED COUNT: 16.6 % (ref 11.6–15.1)
GFR SERPL CREATININE-BSD FRML MDRD: 13 ML/MIN/1.73SQ M
GLUCOSE SERPL-MCNC: 115 MG/DL (ref 65–140)
HCT VFR BLD AUTO: 26.8 % (ref 36.5–49.3)
HGB BLD-MCNC: 8.4 G/DL (ref 12–17)
MAGNESIUM SERPL-MCNC: 2.1 MG/DL (ref 1.6–2.6)
MCH RBC QN AUTO: 29.5 PG (ref 26.8–34.3)
MCHC RBC AUTO-ENTMCNC: 31.3 G/DL (ref 31.4–37.4)
MCV RBC AUTO: 94 FL (ref 82–98)
PLATELET # BLD AUTO: 289 THOUSANDS/UL (ref 149–390)
PMV BLD AUTO: 9.9 FL (ref 8.9–12.7)
POTASSIUM SERPL-SCNC: 4.6 MMOL/L (ref 3.5–5.3)
RBC # BLD AUTO: 2.85 MILLION/UL (ref 3.88–5.62)
SODIUM SERPL-SCNC: 130 MMOL/L (ref 136–145)
WBC # BLD AUTO: 9.42 THOUSAND/UL (ref 4.31–10.16)

## 2019-01-23 PROCEDURE — 85027 COMPLETE CBC AUTOMATED: CPT

## 2019-01-23 PROCEDURE — 83735 ASSAY OF MAGNESIUM: CPT

## 2019-01-23 PROCEDURE — 80048 BASIC METABOLIC PNL TOTAL CA: CPT

## 2019-01-23 PROCEDURE — 36415 COLL VENOUS BLD VENIPUNCTURE: CPT

## 2019-01-24 NOTE — PROGRESS NOTES
Please call the patient regarding his abnormal result    Sodium is a little low at 130 potassium is good at 4 6 BUN is where he usually is his BUN is 75 and his creatinine is 4 76 and his glomerular filtration rate is 13 make sure that his nephrologist gets copies of these reports magnesium level is good at 2 1 blood count is good is hemoglobin is 8 4 holding steady and that is actually a good hemoglobin for somebody with his level of kidney dysfunction

## 2019-01-28 DIAGNOSIS — N18.5 TYPE 1 DIABETES MELLITUS WITH STAGE 5 CHRONIC KIDNEY DISEASE NOT ON CHRONIC DIALYSIS (HCC): Primary | ICD-10-CM

## 2019-01-28 DIAGNOSIS — E10.22 TYPE 1 DIABETES MELLITUS WITH STAGE 5 CHRONIC KIDNEY DISEASE NOT ON CHRONIC DIALYSIS (HCC): Primary | ICD-10-CM

## 2019-01-30 ENCOUNTER — LAB (OUTPATIENT)
Dept: LAB | Facility: CLINIC | Age: 49
End: 2019-01-30
Payer: COMMERCIAL

## 2019-01-30 DIAGNOSIS — D64.9 LOW HEMOGLOBIN: ICD-10-CM

## 2019-01-30 DIAGNOSIS — R89.9 ABNORMAL LABORATORY TEST RESULT: ICD-10-CM

## 2019-01-30 LAB
BASOPHILS # BLD AUTO: 0.06 THOUSANDS/ΜL (ref 0–0.1)
BASOPHILS NFR BLD AUTO: 0 % (ref 0–1)
EOSINOPHIL # BLD AUTO: 0.73 THOUSAND/ΜL (ref 0–0.61)
EOSINOPHIL NFR BLD AUTO: 5 % (ref 0–6)
ERYTHROCYTE [DISTWIDTH] IN BLOOD BY AUTOMATED COUNT: 17.5 % (ref 11.6–15.1)
HCT VFR BLD AUTO: 28.1 % (ref 36.5–49.3)
HGB BLD-MCNC: 9.1 G/DL (ref 12–17)
IMM GRANULOCYTES # BLD AUTO: 0.1 THOUSAND/UL (ref 0–0.2)
IMM GRANULOCYTES NFR BLD AUTO: 1 % (ref 0–2)
LYMPHOCYTES # BLD AUTO: 1.59 THOUSANDS/ΜL (ref 0.6–4.47)
LYMPHOCYTES NFR BLD AUTO: 11 % (ref 14–44)
MCH RBC QN AUTO: 30.1 PG (ref 26.8–34.3)
MCHC RBC AUTO-ENTMCNC: 32.4 G/DL (ref 31.4–37.4)
MCV RBC AUTO: 93 FL (ref 82–98)
MONOCYTES # BLD AUTO: 0.64 THOUSAND/ΜL (ref 0.17–1.22)
MONOCYTES NFR BLD AUTO: 4 % (ref 4–12)
NEUTROPHILS # BLD AUTO: 11.42 THOUSANDS/ΜL (ref 1.85–7.62)
NEUTS SEG NFR BLD AUTO: 79 % (ref 43–75)
NRBC BLD AUTO-RTO: 0 /100 WBCS
PLATELET # BLD AUTO: 365 THOUSANDS/UL (ref 149–390)
PMV BLD AUTO: 9.6 FL (ref 8.9–12.7)
RBC # BLD AUTO: 3.02 MILLION/UL (ref 3.88–5.62)
WBC # BLD AUTO: 14.54 THOUSAND/UL (ref 4.31–10.16)

## 2019-01-30 PROCEDURE — 85025 COMPLETE CBC W/AUTO DIFF WBC: CPT

## 2019-01-30 PROCEDURE — 36415 COLL VENOUS BLD VENIPUNCTURE: CPT

## 2019-02-11 ENCOUNTER — TRANSITIONAL CARE MANAGEMENT (OUTPATIENT)
Dept: FAMILY MEDICINE CLINIC | Facility: CLINIC | Age: 49
End: 2019-02-11

## 2019-02-11 DIAGNOSIS — I10 ESSENTIAL HYPERTENSION: Primary | ICD-10-CM

## 2019-02-11 RX ORDER — TORSEMIDE 20 MG/1
60 TABLET ORAL DAILY
Qty: 360 TABLET | Refills: 3 | Status: SHIPPED | OUTPATIENT
Start: 2019-02-11 | End: 2019-03-27 | Stop reason: SDUPTHER

## 2019-02-15 ENCOUNTER — OFFICE VISIT (OUTPATIENT)
Dept: FAMILY MEDICINE CLINIC | Facility: CLINIC | Age: 49
End: 2019-02-15
Payer: COMMERCIAL

## 2019-02-15 ENCOUNTER — LAB (OUTPATIENT)
Dept: LAB | Facility: MEDICAL CENTER | Age: 49
End: 2019-02-15
Payer: COMMERCIAL

## 2019-02-15 VITALS
DIASTOLIC BLOOD PRESSURE: 44 MMHG | BODY MASS INDEX: 19.12 KG/M2 | HEIGHT: 64 IN | SYSTOLIC BLOOD PRESSURE: 100 MMHG | WEIGHT: 112 LBS

## 2019-02-15 DIAGNOSIS — E10.22 TYPE 1 DIABETES MELLITUS WITH STAGE 4 CHRONIC KIDNEY DISEASE (HCC): Primary | ICD-10-CM

## 2019-02-15 DIAGNOSIS — Z94.0 RENAL TRANSPLANT, STATUS POST: ICD-10-CM

## 2019-02-15 DIAGNOSIS — N18.4 TYPE 1 DIABETES MELLITUS WITH STAGE 4 CHRONIC KIDNEY DISEASE (HCC): ICD-10-CM

## 2019-02-15 DIAGNOSIS — E10.22 TYPE 1 DIABETES MELLITUS WITH STAGE 4 CHRONIC KIDNEY DISEASE (HCC): ICD-10-CM

## 2019-02-15 DIAGNOSIS — N18.4 TYPE 1 DIABETES MELLITUS WITH STAGE 4 CHRONIC KIDNEY DISEASE (HCC): Primary | ICD-10-CM

## 2019-02-15 LAB
ANION GAP SERPL CALCULATED.3IONS-SCNC: 11 MMOL/L (ref 4–13)
BASOPHILS # BLD AUTO: 0.08 THOUSANDS/ΜL (ref 0–0.1)
BASOPHILS NFR BLD AUTO: 0 % (ref 0–1)
BUN SERPL-MCNC: 79 MG/DL (ref 5–25)
CALCIUM SERPL-MCNC: 8.1 MG/DL (ref 8.3–10.1)
CHLORIDE SERPL-SCNC: 99 MMOL/L (ref 100–108)
CHOLEST SERPL-MCNC: 95 MG/DL (ref 50–200)
CO2 SERPL-SCNC: 22 MMOL/L (ref 21–32)
CREAT SERPL-MCNC: 3.39 MG/DL (ref 0.6–1.3)
EOSINOPHIL # BLD AUTO: 0.77 THOUSAND/ΜL (ref 0–0.61)
EOSINOPHIL NFR BLD AUTO: 4 % (ref 0–6)
ERYTHROCYTE [DISTWIDTH] IN BLOOD BY AUTOMATED COUNT: 16.7 % (ref 11.6–15.1)
GFR SERPL CREATININE-BSD FRML MDRD: 20 ML/MIN/1.73SQ M
GLUCOSE SERPL-MCNC: 197 MG/DL (ref 65–140)
HCT VFR BLD AUTO: 31.1 % (ref 36.5–49.3)
HDLC SERPL-MCNC: 37 MG/DL (ref 40–60)
HGB BLD-MCNC: 10 G/DL (ref 12–17)
IMM GRANULOCYTES # BLD AUTO: 0.36 THOUSAND/UL (ref 0–0.2)
IMM GRANULOCYTES NFR BLD AUTO: 2 % (ref 0–2)
LDLC SERPL CALC-MCNC: 41 MG/DL (ref 0–100)
LYMPHOCYTES # BLD AUTO: 1.9 THOUSANDS/ΜL (ref 0.6–4.47)
LYMPHOCYTES NFR BLD AUTO: 10 % (ref 14–44)
MAGNESIUM SERPL-MCNC: 1.5 MG/DL (ref 1.6–2.6)
MCH RBC QN AUTO: 30.4 PG (ref 26.8–34.3)
MCHC RBC AUTO-ENTMCNC: 32.2 G/DL (ref 31.4–37.4)
MCV RBC AUTO: 95 FL (ref 82–98)
MONOCYTES # BLD AUTO: 1.52 THOUSAND/ΜL (ref 0.17–1.22)
MONOCYTES NFR BLD AUTO: 8 % (ref 4–12)
NEUTROPHILS # BLD AUTO: 14.8 THOUSANDS/ΜL (ref 1.85–7.62)
NEUTS SEG NFR BLD AUTO: 76 % (ref 43–75)
NONHDLC SERPL-MCNC: 58 MG/DL
NRBC BLD AUTO-RTO: 0 /100 WBCS
PLATELET # BLD AUTO: 456 THOUSANDS/UL (ref 149–390)
PMV BLD AUTO: 9 FL (ref 8.9–12.7)
POTASSIUM SERPL-SCNC: 5 MMOL/L (ref 3.5–5.3)
RBC # BLD AUTO: 3.29 MILLION/UL (ref 3.88–5.62)
SODIUM SERPL-SCNC: 132 MMOL/L (ref 136–145)
TRIGL SERPL-MCNC: 86 MG/DL
WBC # BLD AUTO: 19.43 THOUSAND/UL (ref 4.31–10.16)

## 2019-02-15 PROCEDURE — 80061 LIPID PANEL: CPT | Performed by: FAMILY MEDICINE

## 2019-02-15 PROCEDURE — 99495 TRANSJ CARE MGMT MOD F2F 14D: CPT | Performed by: FAMILY MEDICINE

## 2019-02-15 PROCEDURE — 80048 BASIC METABOLIC PNL TOTAL CA: CPT

## 2019-02-15 PROCEDURE — 85025 COMPLETE CBC W/AUTO DIFF WBC: CPT

## 2019-02-15 PROCEDURE — 83735 ASSAY OF MAGNESIUM: CPT

## 2019-02-15 PROCEDURE — 80197 ASSAY OF TACROLIMUS: CPT

## 2019-02-15 PROCEDURE — 36415 COLL VENOUS BLD VENIPUNCTURE: CPT

## 2019-02-15 RX ORDER — SODIUM CITRATE AND CITRIC ACID 334; 500 MG/5ML; MG/5ML
30 LIQUID ORAL
COMMUNITY
Start: 2019-02-09 | End: 2019-09-24 | Stop reason: HOSPADM

## 2019-02-15 RX ORDER — TACROLIMUS 1 MG/1
CAPSULE ORAL
COMMUNITY
End: 2019-03-04

## 2019-02-15 RX ORDER — CALCIUM ACETATE 667 MG/1
667 CAPSULE ORAL
COMMUNITY
Start: 2018-10-18 | End: 2019-09-24 | Stop reason: HOSPADM

## 2019-02-15 RX ORDER — METOPROLOL SUCCINATE 25 MG/1
50 TABLET, EXTENDED RELEASE ORAL
COMMUNITY
Start: 2018-03-12 | End: 2019-03-04 | Stop reason: SDUPTHER

## 2019-02-15 RX ORDER — CLOPIDOGREL BISULFATE 75 MG/1
75 TABLET ORAL
COMMUNITY
Start: 2018-10-22 | End: 2019-09-24 | Stop reason: HOSPADM

## 2019-02-15 RX ORDER — ALBUTEROL SULFATE 2.5 MG/3ML
2.5 SOLUTION RESPIRATORY (INHALATION) EVERY 4 HOURS PRN
COMMUNITY
Start: 2019-02-08 | End: 2021-05-28

## 2019-02-15 RX ORDER — AZATHIOPRINE 50 MG/1
50 TABLET ORAL EVERY MORNING
COMMUNITY
Start: 2018-10-18 | End: 2020-08-18 | Stop reason: SDUPTHER

## 2019-02-15 RX ORDER — GUAIFENESIN 600 MG
600 TABLET, EXTENDED RELEASE 12 HR ORAL
COMMUNITY
Start: 2019-02-08 | End: 2019-03-10

## 2019-02-15 RX ORDER — ELECTROLYTES/DEXTROSE
1 SOLUTION, ORAL ORAL
COMMUNITY
Start: 2018-10-18 | End: 2019-09-24 | Stop reason: HOSPADM

## 2019-02-15 NOTE — PROGRESS NOTES
Assessment/Plan:    No problem-specific Assessment & Plan notes found for this encounter  Diagnoses and all orders for this visit:    Type 1 diabetes mellitus with stage 4 chronic kidney disease (HCC)  -     Tacrolimus level; Future  -     CBC and differential; Future  -     Basic metabolic panel; Future  -     Magnesium; Future    Renal transplant, status post  -     Tacrolimus level; Future  -     CBC and differential; Future  -     Basic metabolic panel; Future  -     Magnesium; Future    Other orders  -     albuterol (2 5 mg/3 mL) 0 083 % nebulizer solution; Inhale 2 5 mg  -     azaTHIOprine (IMURAN) 50 mg tablet; Take 50 mg by mouth  -     calcium acetate (PHOSLO) capsule; Take 667 mg by mouth  -     Sod Citrate-Citric Acid (CYTRA-2) 500-334 MG/5ML SOLN; Take 30 mL by mouth  -     clopidogrel (PLAVIX) 75 mg tablet; Take 75 mg by mouth  -     guaiFENesin (MUCINEX) 600 mg 12 hr tablet; Take 600 mg by mouth  -     lansoprazole (PREVACID SOLUTAB) 30 mg disintegrating tablet; Take 30 mg by mouth  -     metoprolol succinate (TOPROL-XL) 25 mg 24 hr tablet; Take 50 mg by mouth  -     Multiple Vitamins-Minerals (MULTIVITAMIN ADULT) TABS; Take 1 tablet by mouth  -     tacrolimus (PROGRAF) 1 mg capsule; Take by mouth 3 caps in the AM, 2 caps PM/HS          Subjective:      Patient ID: Maria Guadalupe Wilson is a 51 y o  male      Transition of care visit patient had a below the knee amputation at [facility] did well postoperatively except he did end up on some oxygen for atelectasis on he is currently on 2 L nasal O2 his labs need to be drawn today to include CBC to crawl MS level BMP and magnesium level      The following portions of the patient's history were reviewed and updated as appropriate: He  has a past medical history of Bacteremia (12/21/2018), Cardiac arrest ([facility] ), Diabetes mellitus ([facility] ), Diabetes mellitus type 1 ([facility] ), Hypertension, Infection at site of external fixator pin [facility]), MI (myocardial infarction) ([facility] ), Pneumonia, Renal failure, and Renal transplant, status post (07/21/2007)  He   Patient Active Problem List    Diagnosis Date Noted    Bacteremia 12/21/2018    Stage 4 chronic kidney disease ([facility] ) 12/20/2018    High anion gap metabolic acidosis 03/34/6311    Hyperphosphatemia 12/20/2018    Gastrointestinal hemorrhage 12/20/2018    Severe sepsis ([facility] ) 12/19/2018    Urinary retention 09/24/2017    Chronic kidney disease, stage IV (severe) ([facility] ) 09/23/2017    Chronic osteomyelitis of tibia ([facility] ) 09/23/2017    DKA (diabetic ketoacidoses) ([facility] ) 09/23/2017    Diarrhea 09/23/2017    Cellulitis of ankle 09/23/2017    Non-ST elevation myocardial infarction (NSTEMI), type 2 09/23/2017    Closed fracture of distal end of right tibia with routine healing 07/03/2017    Osteomyelitis ([facility] ) 12/07/2016    Poor circulation 12/07/2016    Sepsis ([facility] ) 10/27/2016    Acute kidney injury ([facility] ) 10/27/2016    Osteomyelitis ([facility] ) 10/26/2016    Foot pain 10/26/2016    Type 1 diabetes mellitus ([place] [address] ) 10/26/2016    Renal transplant, status post 10/26/2016    Immunosuppression ([place] [address] ) 11/04/2014    Hypertension 08/04/2010     He  has a past surgical history that includes Nephrectomy transplanted organ; GLUTAMIC ACID DECARBOXYLASE (HISTORICAL); Eye surgery; Toe amputation (Right, 10/27/2016); pr open treatment fracture distal tibia fibula (Right, 7/3/2017); CLOSED REDUCTION ANKLE (Right, 7/3/2017); Ankle fracture surgery; Ankle hardware removal (Right, 7/31/2017); Ankle hardware removal (Right, 8/17/2017); Fracture surgery; Cardiac surgery; IR PICC line (8/20/2018); IR venous line removal (10/5/2018); Esophagogastroduodenoscopy (N/A, 12/20/2018); and Leg amputation (Right, 02/01/2019)  His family history includes Coronary artery disease in his mother; Diabetes in his brother  He  reports that he has never smoked  He has never used smokeless tobacco  He reports that he drank alcohol   He reports that he does not use drugs   Current Outpatient Medications   Medication Sig Dispense Refill    albuterol (2 5 mg/3 mL) 0 083 % nebulizer solution Inhale 2 5 mg      atorvastatin (LIPITOR) 80 mg tablet Take 80 mg by mouth daily       azaTHIOprine (IMURAN) 50 mg tablet Take 50 mg by mouth      calcitriol (ROCALTROL) 0 5 MCG capsule Take 1 mcg by mouth daily Monday Wednesday, Friday      calcium acetate (PHOSLO) capsule Take 667 mg by mouth      clopidogrel (PLAVIX) 75 mg tablet Take 75 mg by mouth      guaiFENesin (MUCINEX) 600 mg 12 hr tablet Take 600 mg by mouth      insulin detemir (LEVEMIR) 100 units/mL subcutaneous injection Inject 10 Units under the skin 2 (two) times a day      insulin lispro (HUMALOG) 100 units/mL injection Inject 30 Units under the skin daily 30 mL 5    lansoprazole (PREVACID SOLUTAB) 30 mg disintegrating tablet Take 30 mg by mouth      metoprolol succinate (TOPROL-XL) 25 mg 24 hr tablet Take 50 mg by mouth      Multiple Vitamins-Minerals (MULTIVITAMIN ADULT) TABS Take 1 tablet by mouth      NIFEdipine (PROCARDIA) 10 mg capsule Take 10 mg by mouth daily      ONE TOUCH ULTRA TEST test strip CHECK BLOOD SUGAR 5 TO 6 TIMES DAILY 100 each 6    predniSONE 5 mg tablet Take 5 mg by mouth daily      Sod Citrate-Citric Acid (CYTRA-2) 500-334 MG/5ML SOLN Take 30 mL by mouth      tacrolimus (PROGRAF) 1 mg capsule Take by mouth 3 caps in the AM, 2 caps PM/HS      torsemide (DEMADEX) 20 mg tablet Take 3 tablets (60 mg total) by mouth daily 360 tablet 3     No current facility-administered medications for this visit        Current Outpatient Medications on File Prior to Visit   Medication Sig    albuterol (2 5 mg/3 mL) 0 083 % nebulizer solution Inhale 2 5 mg    atorvastatin (LIPITOR) 80 mg tablet Take 80 mg by mouth daily     azaTHIOprine (IMURAN) 50 mg tablet Take 50 mg by mouth    calcitriol (ROCALTROL) 0 5 MCG capsule Take 1 mcg by mouth daily Monday Wednesday, Friday    calcium acetate (PHOSLO) capsule Take 667 mg by mouth    clopidogrel (PLAVIX) 75 mg tablet Take 75 mg by mouth    guaiFENesin (MUCINEX) 600 mg 12 hr tablet Take 600 mg by mouth    insulin detemir (LEVEMIR) 100 units/mL subcutaneous injection Inject 10 Units under the skin 2 (two) times a day    insulin lispro (HUMALOG) 100 units/mL injection Inject 30 Units under the skin daily    lansoprazole (PREVACID SOLUTAB) 30 mg disintegrating tablet Take 30 mg by mouth    metoprolol succinate (TOPROL-XL) 25 mg 24 hr tablet Take 50 mg by mouth    Multiple Vitamins-Minerals (MULTIVITAMIN ADULT) TABS Take 1 tablet by mouth    NIFEdipine (PROCARDIA) 10 mg capsule Take 10 mg by mouth daily    ONE TOUCH ULTRA TEST test strip CHECK BLOOD SUGAR 5 TO 6 TIMES DAILY    predniSONE 5 mg tablet Take 5 mg by mouth daily    Sod Citrate-Citric Acid (CYTRA-2) 500-334 MG/5ML SOLN Take 30 mL by mouth    tacrolimus (PROGRAF) 1 mg capsule Take by mouth 3 caps in the AM, 2 caps PM/HS    torsemide (DEMADEX) 20 mg tablet Take 3 tablets (60 mg total) by mouth daily    [DISCONTINUED] aspirin (ECOTRIN LOW STRENGTH) 81 mg EC tablet Take 81 mg by mouth daily    [DISCONTINUED] Heparin Sodium, Porcine, (HEPARIN, PORCINE,) 5,000 units/mL Inject 1 mL (5,000 Units total) under the skin every 8 (eight) hours    [DISCONTINUED] Lactobacillus (ACIDOPHILUS PO) Take 300 mg by mouth    [DISCONTINUED] sodium bicarbonate 650 mg tablet Take 1 tablet by mouth 2 (two) times a day    [DISCONTINUED] sodium chloride Infuse 125 mL/hr into a venous catheter continuous     No current facility-administered medications on file prior to visit  He has No Known Allergies       Review of Systems   Constitutional: Positive for activity change and appetite change  Negative for diaphoresis, fatigue and fever  HENT: Negative  Eyes: Negative  Respiratory: Negative for apnea, cough, chest tightness, shortness of breath and wheezing      Cardiovascular: Negative for chest pain, palpitations and leg swelling  Gastrointestinal: Negative for abdominal distention, abdominal pain, anal bleeding, constipation, diarrhea, nausea and vomiting  Endocrine: Negative for cold intolerance, heat intolerance, polydipsia, polyphagia and polyuria  Genitourinary: Negative for difficulty urinating, dysuria, flank pain, hematuria and urgency  Musculoskeletal: Negative for arthralgias, back pain, gait problem, joint swelling and myalgias  Status post right below-knee amputation   Skin: Negative for color change, rash and wound  Allergic/Immunologic: Negative for environmental allergies, food allergies and immunocompromised state  Neurological: Negative for dizziness, seizures, syncope, speech difficulty, numbness and headaches  Hematological: Negative for adenopathy  Does not bruise/bleed easily  Psychiatric/Behavioral: Negative for agitation, behavioral problems, hallucinations, sleep disturbance and suicidal ideas  Objective:      BP (!) 100/44 (BP Location: Left arm, Patient Position: Sitting, Cuff Size: Standard)   Ht 5' 4" (1 626 m)   Wt 50 8 kg (112 lb)   BMI 19 22 kg/m²          Physical Exam   Constitutional: He is oriented to person, place, and time  He appears well-developed and well-nourished  No distress  HENT:   Head: Normocephalic  Right Ear: External ear normal    Left Ear: External ear normal    Nose: Nose normal    Mouth/Throat: Oropharynx is clear and moist    Eyes: Pupils are equal, round, and reactive to light  Conjunctivae and EOM are normal  Right eye exhibits no discharge  Left eye exhibits no discharge  No scleral icterus  Neck: Normal range of motion  No tracheal deviation present  No thyromegaly present  Cardiovascular: Normal rate, regular rhythm and normal heart sounds  Exam reveals no gallop and no friction rub  No murmur heard  Pulmonary/Chest: Effort normal and breath sounds normal  No respiratory distress  He has no wheezes  Abdominal: Soft  Bowel sounds are normal  He exhibits no mass  There is no tenderness  There is no guarding  Musculoskeletal: He exhibits deformity  He exhibits no edema  Right below-knee amputation   Lymphadenopathy:     He has no cervical adenopathy  Neurological: He is alert and oriented to person, place, and time  No cranial nerve deficit  Skin: Skin is warm and dry  No rash noted  He is not diaphoretic  No erythema  Psychiatric: He has a normal mood and affect   Thought content normal

## 2019-02-16 LAB — TACROLIMUS BLD-MCNC: 12.6 NG/ML (ref 2–20)

## 2019-02-18 NOTE — RESULT ENCOUNTER NOTE
Please call the patient regarding his abnormal result    Tacrolimus level was 12 6 normals are from 02/20 BUN is holding steady at 79 creatinine is actually little bit better at 3 39 blood sugar was 197 but I realize that was not fasting his GFR is actually a little better than it has been it use to be anywhere from 15-11 and is now up to 20 magnesium level is slightly low at 1 5 normals or from 1 6-2 6 white blood count is 32097 weeks ago was 14,003 weeks ago was 9000 which was normal so it is slightly elevated this could be from the stress of his surgery hemoglobin is good at 10 platelet count is slightly elevated but that too is from surgery I probably would not start antibiotics unless he has a fever or chills

## 2019-02-20 DIAGNOSIS — D72.829 LEUKOCYTOSIS, UNSPECIFIED TYPE: ICD-10-CM

## 2019-02-20 DIAGNOSIS — N39.0 URINARY TRACT INFECTION WITHOUT HEMATURIA, SITE UNSPECIFIED: Primary | ICD-10-CM

## 2019-02-23 ENCOUNTER — LAB (OUTPATIENT)
Dept: LAB | Facility: MEDICAL CENTER | Age: 49
End: 2019-02-23
Payer: COMMERCIAL

## 2019-02-23 ENCOUNTER — TRANSCRIBE ORDERS (OUTPATIENT)
Dept: LAB | Facility: MEDICAL CENTER | Age: 49
End: 2019-02-23

## 2019-02-23 DIAGNOSIS — I12.9 RENAL HYPERTENSION: Primary | ICD-10-CM

## 2019-02-23 DIAGNOSIS — N39.0 URINARY TRACT INFECTION WITHOUT HEMATURIA, SITE UNSPECIFIED: ICD-10-CM

## 2019-02-23 DIAGNOSIS — D72.829 LEUKOCYTOSIS, UNSPECIFIED TYPE: ICD-10-CM

## 2019-02-23 DIAGNOSIS — I12.9 RENAL HYPERTENSION: ICD-10-CM

## 2019-02-23 LAB
ANION GAP SERPL CALCULATED.3IONS-SCNC: 8 MMOL/L (ref 4–13)
BACTERIA UR QL AUTO: ABNORMAL /HPF
BASOPHILS # BLD AUTO: 0.11 THOUSANDS/ΜL (ref 0–0.1)
BASOPHILS NFR BLD AUTO: 1 % (ref 0–1)
BILIRUB UR QL STRIP: NEGATIVE
BUN SERPL-MCNC: 105 MG/DL (ref 5–25)
CALCIUM SERPL-MCNC: 8.7 MG/DL (ref 8.3–10.1)
CHLORIDE SERPL-SCNC: 103 MMOL/L (ref 100–108)
CLARITY UR: CLEAR
CO2 SERPL-SCNC: 27 MMOL/L (ref 21–32)
COLOR UR: YELLOW
CREAT SERPL-MCNC: 3.25 MG/DL (ref 0.6–1.3)
EOSINOPHIL # BLD AUTO: 2.04 THOUSAND/ΜL (ref 0–0.61)
EOSINOPHIL NFR BLD AUTO: 13 % (ref 0–6)
ERYTHROCYTE [DISTWIDTH] IN BLOOD BY AUTOMATED COUNT: 16.9 % (ref 11.6–15.1)
FERRITIN SERPL-MCNC: 756 NG/ML (ref 8–388)
GFR SERPL CREATININE-BSD FRML MDRD: 21 ML/MIN/1.73SQ M
GLUCOSE P FAST SERPL-MCNC: 97 MG/DL (ref 65–99)
GLUCOSE UR STRIP-MCNC: NEGATIVE MG/DL
HCT VFR BLD AUTO: 29 % (ref 36.5–49.3)
HGB BLD-MCNC: 9 G/DL (ref 12–17)
HGB UR QL STRIP.AUTO: ABNORMAL
HYALINE CASTS #/AREA URNS LPF: ABNORMAL /LPF
IMM GRANULOCYTES # BLD AUTO: 0.11 THOUSAND/UL (ref 0–0.2)
IMM GRANULOCYTES NFR BLD AUTO: 1 % (ref 0–2)
IRON SATN MFR SERPL: 40 %
IRON SERPL-MCNC: 80 UG/DL (ref 65–175)
KETONES UR STRIP-MCNC: NEGATIVE MG/DL
LEUKOCYTE ESTERASE UR QL STRIP: NEGATIVE
LYMPHOCYTES # BLD AUTO: 2.96 THOUSANDS/ΜL (ref 0.6–4.47)
LYMPHOCYTES NFR BLD AUTO: 18 % (ref 14–44)
MCH RBC QN AUTO: 30.6 PG (ref 26.8–34.3)
MCHC RBC AUTO-ENTMCNC: 31 G/DL (ref 31.4–37.4)
MCV RBC AUTO: 99 FL (ref 82–98)
MONOCYTES # BLD AUTO: 1.07 THOUSAND/ΜL (ref 0.17–1.22)
MONOCYTES NFR BLD AUTO: 7 % (ref 4–12)
NEUTROPHILS # BLD AUTO: 10.05 THOUSANDS/ΜL (ref 1.85–7.62)
NEUTS SEG NFR BLD AUTO: 60 % (ref 43–75)
NITRITE UR QL STRIP: NEGATIVE
NON-SQ EPI CELLS URNS QL MICRO: ABNORMAL /HPF
NRBC BLD AUTO-RTO: 0 /100 WBCS
PH UR STRIP.AUTO: 7.5 [PH] (ref 4.5–8)
PLATELET # BLD AUTO: 447 THOUSANDS/UL (ref 149–390)
PMV BLD AUTO: 9 FL (ref 8.9–12.7)
POTASSIUM SERPL-SCNC: 5.5 MMOL/L (ref 3.5–5.3)
PROT UR STRIP-MCNC: ABNORMAL MG/DL
RBC # BLD AUTO: 2.94 MILLION/UL (ref 3.88–5.62)
RBC #/AREA URNS AUTO: ABNORMAL /HPF
SODIUM SERPL-SCNC: 138 MMOL/L (ref 136–145)
SP GR UR STRIP.AUTO: 1.01 (ref 1–1.03)
TIBC SERPL-MCNC: 200 UG/DL (ref 250–450)
UROBILINOGEN UR QL STRIP.AUTO: 0.2 E.U./DL
WBC # BLD AUTO: 16.34 THOUSAND/UL (ref 4.31–10.16)
WBC #/AREA URNS AUTO: ABNORMAL /HPF

## 2019-02-23 PROCEDURE — 36415 COLL VENOUS BLD VENIPUNCTURE: CPT

## 2019-02-23 PROCEDURE — 80197 ASSAY OF TACROLIMUS: CPT

## 2019-02-23 PROCEDURE — 83540 ASSAY OF IRON: CPT

## 2019-02-23 PROCEDURE — 87086 URINE CULTURE/COLONY COUNT: CPT

## 2019-02-23 PROCEDURE — 83550 IRON BINDING TEST: CPT

## 2019-02-23 PROCEDURE — 81001 URINALYSIS AUTO W/SCOPE: CPT

## 2019-02-23 PROCEDURE — 82728 ASSAY OF FERRITIN: CPT

## 2019-02-23 PROCEDURE — 80048 BASIC METABOLIC PNL TOTAL CA: CPT

## 2019-02-23 PROCEDURE — 85025 COMPLETE CBC W/AUTO DIFF WBC: CPT

## 2019-02-24 LAB
BACTERIA UR CULT: NORMAL
TACROLIMUS BLD-MCNC: 4.5 NG/ML (ref 2–20)

## 2019-02-25 NOTE — RESULT ENCOUNTER NOTE
Please call the patient regarding his abnormal result    Trace of blood in the urinalysis but no bacteria is seen

## 2019-03-04 DIAGNOSIS — Z94.0 RENAL TRANSPLANT, STATUS POST: ICD-10-CM

## 2019-03-04 DIAGNOSIS — I10 ESSENTIAL HYPERTENSION: ICD-10-CM

## 2019-03-04 RX ORDER — METOPROLOL SUCCINATE 25 MG/1
50 TABLET, EXTENDED RELEASE ORAL DAILY
Qty: 60 TABLET | Refills: 0 | Status: SHIPPED | OUTPATIENT
Start: 2019-03-04 | End: 2019-04-08 | Stop reason: SDUPTHER

## 2019-03-04 RX ORDER — TACROLIMUS 1 MG/1
CAPSULE ORAL
Qty: 150 CAPSULE | Refills: 0 | Status: ON HOLD | OUTPATIENT
Start: 2019-03-04 | End: 2019-10-22 | Stop reason: CLARIF

## 2019-03-25 ENCOUNTER — TELEPHONE (OUTPATIENT)
Dept: NEPHROLOGY | Facility: CLINIC | Age: 49
End: 2019-03-25

## 2019-03-25 NOTE — TELEPHONE ENCOUNTER
I was unable to leave a message to remind patient of appointment for 03/26/19 with Dr Herrera at the [facility]

## 2019-03-26 ENCOUNTER — TELEPHONE (OUTPATIENT)
Dept: NEPHROLOGY | Facility: CLINIC | Age: 49
End: 2019-03-26

## 2019-03-26 NOTE — TELEPHONE ENCOUNTER
I called patient to reschedule Consult with Dr Sprague Later, patient said he injured his ankle and is unable to drive so he is having a hard time finding transportation, he said he would have to give us a call back when he is able to find a ride

## 2019-03-26 NOTE — TELEPHONE ENCOUNTER
Thank you    Can we have pt placed on call back in 2-3 weeks to see where he is with his transport    np

## 2019-03-27 DIAGNOSIS — I10 ESSENTIAL HYPERTENSION: ICD-10-CM

## 2019-03-27 RX ORDER — TORSEMIDE 20 MG/1
60 TABLET ORAL DAILY
Qty: 360 TABLET | Refills: 3 | Status: SHIPPED | OUTPATIENT
Start: 2019-03-27 | End: 2019-09-24 | Stop reason: HOSPADM

## 2019-04-08 DIAGNOSIS — I10 ESSENTIAL HYPERTENSION: ICD-10-CM

## 2019-04-08 DIAGNOSIS — E10.21 TYPE 1 DIABETES MELLITUS WITH NEPHROPATHY (HCC): ICD-10-CM

## 2019-04-08 RX ORDER — METOPROLOL SUCCINATE 25 MG/1
50 TABLET, EXTENDED RELEASE ORAL DAILY
Qty: 180 TABLET | Refills: 1 | Status: SHIPPED | OUTPATIENT
Start: 2019-04-08 | End: 2020-04-14 | Stop reason: SDUPTHER

## 2019-04-16 ENCOUNTER — TELEPHONE (OUTPATIENT)
Dept: NEUROLOGY | Facility: CLINIC | Age: 49
End: 2019-04-16

## 2019-04-16 ENCOUNTER — CONSULT (OUTPATIENT)
Dept: NEUROLOGY | Facility: CLINIC | Age: 49
End: 2019-04-16
Payer: COMMERCIAL

## 2019-04-16 VITALS
HEART RATE: 85 BPM | SYSTOLIC BLOOD PRESSURE: 181 MMHG | RESPIRATION RATE: 12 BRPM | BODY MASS INDEX: 19.22 KG/M2 | HEIGHT: 64 IN | DIASTOLIC BLOOD PRESSURE: 83 MMHG | TEMPERATURE: 97.6 F

## 2019-04-16 DIAGNOSIS — E11.40 DIABETIC NEUROPATHY (HCC): ICD-10-CM

## 2019-04-16 DIAGNOSIS — Z89.611 S/P AKA (ABOVE KNEE AMPUTATION), RIGHT (HCC): Primary | ICD-10-CM

## 2019-04-16 DIAGNOSIS — M21.372 FOOT DROP, LEFT: ICD-10-CM

## 2019-04-16 PROCEDURE — 99244 OFF/OP CNSLTJ NEW/EST MOD 40: CPT | Performed by: PHYSICAL MEDICINE & REHABILITATION

## 2019-04-16 PROCEDURE — 99354 PR PROLONGED SVC OUTPATIENT SETTING 1ST HOUR: CPT | Performed by: PHYSICAL MEDICINE & REHABILITATION

## 2019-04-19 ENCOUNTER — TELEPHONE (OUTPATIENT)
Dept: NEUROLOGY | Facility: CLINIC | Age: 49
End: 2019-04-19

## 2019-04-22 ENCOUNTER — EVALUATION (OUTPATIENT)
Dept: PHYSICAL THERAPY | Facility: CLINIC | Age: 49
End: 2019-04-22
Payer: COMMERCIAL

## 2019-04-22 DIAGNOSIS — Z89.611 S/P AKA (ABOVE KNEE AMPUTATION), RIGHT (HCC): ICD-10-CM

## 2019-04-22 DIAGNOSIS — R26.9 GAIT ABNORMALITY: Primary | ICD-10-CM

## 2019-04-22 PROCEDURE — 97163 PT EVAL HIGH COMPLEX 45 MIN: CPT | Performed by: PHYSICAL THERAPIST

## 2019-04-22 PROCEDURE — 97535 SELF CARE MNGMENT TRAINING: CPT | Performed by: PHYSICAL THERAPIST

## 2019-05-04 ENCOUNTER — TRANSCRIBE ORDERS (OUTPATIENT)
Dept: LAB | Facility: MEDICAL CENTER | Age: 49
End: 2019-05-04

## 2019-05-04 ENCOUNTER — APPOINTMENT (OUTPATIENT)
Dept: LAB | Facility: MEDICAL CENTER | Age: 49
End: 2019-05-04
Payer: COMMERCIAL

## 2019-05-04 DIAGNOSIS — Z94.0 KIDNEY REPLACED BY TRANSPLANT: ICD-10-CM

## 2019-05-04 DIAGNOSIS — Z94.0 KIDNEY REPLACED BY TRANSPLANT: Primary | ICD-10-CM

## 2019-05-04 LAB
ALBUMIN SERPL BCP-MCNC: 3.2 G/DL (ref 3.5–5)
ALP SERPL-CCNC: 97 U/L (ref 46–116)
ALT SERPL W P-5'-P-CCNC: 36 U/L (ref 12–78)
ANION GAP SERPL CALCULATED.3IONS-SCNC: 11 MMOL/L (ref 4–13)
AST SERPL W P-5'-P-CCNC: 18 U/L (ref 5–45)
BASOPHILS # BLD AUTO: 0.07 THOUSANDS/ΜL (ref 0–0.1)
BASOPHILS NFR BLD AUTO: 1 % (ref 0–1)
BILIRUB SERPL-MCNC: 0.42 MG/DL (ref 0.2–1)
BUN SERPL-MCNC: 140 MG/DL (ref 5–25)
CALCIUM SERPL-MCNC: 8.5 MG/DL (ref 8.3–10.1)
CHLORIDE SERPL-SCNC: 106 MMOL/L (ref 100–108)
CO2 SERPL-SCNC: 18 MMOL/L (ref 21–32)
CREAT SERPL-MCNC: 4.21 MG/DL (ref 0.6–1.3)
EOSINOPHIL # BLD AUTO: 0.67 THOUSAND/ΜL (ref 0–0.61)
EOSINOPHIL NFR BLD AUTO: 5 % (ref 0–6)
ERYTHROCYTE [DISTWIDTH] IN BLOOD BY AUTOMATED COUNT: 13.5 % (ref 11.6–15.1)
FERRITIN SERPL-MCNC: 519 NG/ML (ref 8–388)
GFR SERPL CREATININE-BSD FRML MDRD: 15 ML/MIN/1.73SQ M
GLUCOSE P FAST SERPL-MCNC: 175 MG/DL (ref 65–99)
HCT VFR BLD AUTO: 29.4 % (ref 36.5–49.3)
HGB BLD-MCNC: 9.7 G/DL (ref 12–17)
IMM GRANULOCYTES # BLD AUTO: 0.05 THOUSAND/UL (ref 0–0.2)
IMM GRANULOCYTES NFR BLD AUTO: 0 % (ref 0–2)
IRON SATN MFR SERPL: 39 %
IRON SERPL-MCNC: 90 UG/DL (ref 65–175)
LYMPHOCYTES # BLD AUTO: 3.73 THOUSANDS/ΜL (ref 0.6–4.47)
LYMPHOCYTES NFR BLD AUTO: 28 % (ref 14–44)
MCH RBC QN AUTO: 31.6 PG (ref 26.8–34.3)
MCHC RBC AUTO-ENTMCNC: 33 G/DL (ref 31.4–37.4)
MCV RBC AUTO: 96 FL (ref 82–98)
MONOCYTES # BLD AUTO: 0.93 THOUSAND/ΜL (ref 0.17–1.22)
MONOCYTES NFR BLD AUTO: 7 % (ref 4–12)
NEUTROPHILS # BLD AUTO: 7.76 THOUSANDS/ΜL (ref 1.85–7.62)
NEUTS SEG NFR BLD AUTO: 59 % (ref 43–75)
NRBC BLD AUTO-RTO: 0 /100 WBCS
PHOSPHATE SERPL-MCNC: 5.5 MG/DL (ref 2.7–4.5)
PLATELET # BLD AUTO: 303 THOUSANDS/UL (ref 149–390)
PMV BLD AUTO: 9.2 FL (ref 8.9–12.7)
POTASSIUM SERPL-SCNC: 4.5 MMOL/L (ref 3.5–5.3)
PROT SERPL-MCNC: 7.3 G/DL (ref 6.4–8.2)
PTH-INTACT SERPL-MCNC: 132.4 PG/ML (ref 18.4–80.1)
RBC # BLD AUTO: 3.07 MILLION/UL (ref 3.88–5.62)
SODIUM SERPL-SCNC: 135 MMOL/L (ref 136–145)
TACROLIMUS BLD-MCNC: 4.5 NG/ML (ref 2–20)
TIBC SERPL-MCNC: 232 UG/DL (ref 250–450)
WBC # BLD AUTO: 13.21 THOUSAND/UL (ref 4.31–10.16)

## 2019-05-04 PROCEDURE — 83550 IRON BINDING TEST: CPT

## 2019-05-04 PROCEDURE — 84100 ASSAY OF PHOSPHORUS: CPT

## 2019-05-04 PROCEDURE — 82728 ASSAY OF FERRITIN: CPT

## 2019-05-04 PROCEDURE — 80197 ASSAY OF TACROLIMUS: CPT

## 2019-05-04 PROCEDURE — 83970 ASSAY OF PARATHORMONE: CPT

## 2019-05-04 PROCEDURE — 80053 COMPREHEN METABOLIC PANEL: CPT

## 2019-05-04 PROCEDURE — 36415 COLL VENOUS BLD VENIPUNCTURE: CPT

## 2019-05-04 PROCEDURE — 85025 COMPLETE CBC W/AUTO DIFF WBC: CPT

## 2019-05-04 PROCEDURE — 83540 ASSAY OF IRON: CPT

## 2019-06-03 DIAGNOSIS — R26.2 AMBULATORY DYSFUNCTION: ICD-10-CM

## 2019-06-03 DIAGNOSIS — Z89.619 HX OF LEG AMPUTATION (HCC): Primary | ICD-10-CM

## 2019-06-21 LAB
LEFT EYE DIABETIC RETINOPATHY: NORMAL
RIGHT EYE DIABETIC RETINOPATHY: NORMAL

## 2019-06-29 ENCOUNTER — LAB (OUTPATIENT)
Dept: LAB | Facility: MEDICAL CENTER | Age: 49
End: 2019-06-29
Payer: COMMERCIAL

## 2019-06-29 DIAGNOSIS — I12.9 RENAL HYPERTENSION: ICD-10-CM

## 2019-06-29 DIAGNOSIS — E10.22 TYPE 1 DIABETES MELLITUS WITH DIABETIC CHRONIC KIDNEY DISEASE, UNSPECIFIED CKD STAGE (HCC): ICD-10-CM

## 2019-06-29 LAB
EST. AVERAGE GLUCOSE BLD GHB EST-MCNC: 134 MG/DL
HBA1C MFR BLD: 6.3 % (ref 4.2–6.3)
PHOSPHATE SERPL-MCNC: 4.7 MG/DL (ref 2.7–4.5)

## 2019-06-29 PROCEDURE — 36415 COLL VENOUS BLD VENIPUNCTURE: CPT

## 2019-06-29 PROCEDURE — 84100 ASSAY OF PHOSPHORUS: CPT

## 2019-06-29 PROCEDURE — 83036 HEMOGLOBIN GLYCOSYLATED A1C: CPT

## 2019-07-03 ENCOUNTER — EVALUATION (OUTPATIENT)
Dept: PHYSICAL THERAPY | Facility: CLINIC | Age: 49
End: 2019-07-03
Payer: COMMERCIAL

## 2019-07-03 DIAGNOSIS — R26.9 ABNORMALITY OF GAIT: ICD-10-CM

## 2019-07-03 DIAGNOSIS — Z89.611 HISTORY OF RIGHT ABOVE KNEE AMPUTATION (HCC): Primary | ICD-10-CM

## 2019-07-03 PROCEDURE — 97162 PT EVAL MOD COMPLEX 30 MIN: CPT | Performed by: PHYSICAL THERAPIST

## 2019-07-03 PROCEDURE — 97116 GAIT TRAINING THERAPY: CPT | Performed by: PHYSICAL THERAPIST

## 2019-07-03 PROCEDURE — 97535 SELF CARE MNGMENT TRAINING: CPT | Performed by: PHYSICAL THERAPIST

## 2019-07-03 NOTE — PROGRESS NOTES
PT Evaluation     Today's date: 7/3/2019  Patient name: Sharon Mccord  : 1970  MRN: [id number]  Referring provider: Izzy Raymond MD  Dx:   Encounter Diagnosis     ICD-10-CM    1  History of right above knee amputation ([facility] ) Z89 611    2  Abnormality of gait R26 9                   Assessment  Assessment details: PT notes the patient with decrease ROM and strength t/o the right hip with demonstration of impaired gait pattern and balance s/p right AKA leading to functional limitations with need for course of skilled therapy for 10-12 weeks with focus on gait/balance, strengthening, analgesic modalities, posture, and HEP  PT will consult with BOAS that developed the prosthetic limb to discuss the LE positioning and if any further adjustments will be made to the limb  PT notes the patient with complicated PMH that may impair prognosis and/or prolong course of skilled therapy  Impairments: abnormal gait, abnormal or restricted ROM, activity intolerance, impaired balance, impaired physical strength, lacks appropriate home exercise program and weight-bearing intolerance  Understanding of Dx/Px/POC: good   Prognosis: fair    Goals  ST  Initiate HEP  2  Patient will ambulate 100+ feet with RW with min A/CG  3  Increase ROM to WNL with hip extension  4  Increase strength by 1-2 mm grades  5  Improve postural awareness with decrease FB of trunk with standing and walking position   LT  Patient MI with ambulation with RW/SPC  2  Improve postural awareness with standing and walking activity  3  MI with all transfers  4  Decrease limitations with ADL  5   DC with HEP    Plan  Plan details: PT note review of POC and findings with patient who is in agreement with PT recommendations of course of skilled therapy     Patient would benefit from: PT eval and skilled physical therapy  Planned modality interventions: cryotherapy and thermotherapy: hydrocollator packs  Planned therapy interventions: neuromuscular re-education, balance, balance/weight bearing training, patient education, postural training, self care, strengthening, stretching, therapeutic exercise, home exercise program, graded exercise, gait training, functional ROM exercises and flexibility  Frequency: 2x week  Duration in visits: 16  Duration in weeks: 8  Treatment plan discussed with: patient        Subjective Evaluation    History of Present Illness  Date of surgery: 2019  Mechanism of injury: surgery  Mechanism of injury: Patient reports development of sepsis in the right LE s/p right ankle fracture leading to multiple medical issues with need for surgical amputation of the right LE above the knee  Patient reports he was recently outfitted with prosthetic limb and is now ordered for OPPT for evaluation and treatment of gait/balance dysfunction  Patient reports he has been trying to  the new LE at home but feels very unstable as well as occasional LBP with prolonged standing activities  Patient reports significant PMH of kidney dysfunction with kidney transplant, DM, osteomyelitis/sepsis, and chronic LBP     Pain  Current pain ratin  At best pain ratin  At worst pain ratin  Location: Patient denies any pain in the right LE  Relieving factors: rest  Aggravating factors: standing and walking    Treatments  Current treatment: physical therapy  Patient Goals  Patient goals for therapy: improved balance, increased motion, increased strength, independence with ADLs/IADLs and return to sport/leisure activities          Objective     Palpation     Additional Palpation Details  No pain with palpation of the right residual limb with no signs of infection and no skin break down noted     Active Range of Motion   Left Hip   Normal active range of motion    Right Hip   Flexion: WFL  Extension: 3 degrees   Abduction: WFL  Adduction: WFL    Additional Active Range of Motion Details  PT notes lack of hip extension     Passive Range of Motion     Right Hip   Extension: 7 degrees     Strength/Myotome Testing     Left Hip   Normal muscle strength    Right Hip   Planes of Motion   Flexion: 4  Extension: 4  Abduction: 4-  Adduction: 4-  External rotation: 4-  Internal rotation: 4-    Additional Strength Details  PT notes decrease ROM and strength t/o the right hip     Ambulation     Observational Gait   Gait: asymmetric   Decreased walking speed, stride length, right stance time, right swing time and right step length  Additional Observational Gait Details  PT notes demonstration of impaired gait pattern with right prosthetic limb with FB of trunk, step to pattern, decrease step length on the right, decrease stance on the right, and decrease simba with rating of fair- with static and dynamic activities with need for min A from PT to ambulate with RW       General Comments:      Hip Comments   PT notes no pistoning noted with wearing of the right prosthetic limb but positioning of right hip flexion with standing positioning in the prosthetic limb with knee joint placed on the posterior distal surface of the prosthetic limb              Precautions  Fall risk, WBAT right LE with prosthetic limb       Manual  7/3       N/A                                            Exercise Diary  7/3       Sit to stand transfers with RW         Stand WS         Ambulation with prosthetic limb with RW  15 min        Prone hip extension         REYES         Bridge        DD seated hip march         DD seated Trunk rotation and PNF                                                                                                             Modalities 7/3        MHP or CP to the LB in seated  PRN

## 2019-07-08 ENCOUNTER — APPOINTMENT (OUTPATIENT)
Dept: PHYSICAL THERAPY | Facility: CLINIC | Age: 49
End: 2019-07-08
Payer: COMMERCIAL

## 2019-07-10 ENCOUNTER — OFFICE VISIT (OUTPATIENT)
Dept: PHYSICAL THERAPY | Facility: CLINIC | Age: 49
End: 2019-07-10
Payer: COMMERCIAL

## 2019-07-10 DIAGNOSIS — R26.9 ABNORMALITY OF GAIT: ICD-10-CM

## 2019-07-10 DIAGNOSIS — Z89.611 HISTORY OF RIGHT ABOVE KNEE AMPUTATION (HCC): Primary | ICD-10-CM

## 2019-07-10 PROCEDURE — 97116 GAIT TRAINING THERAPY: CPT | Performed by: PHYSICAL THERAPIST

## 2019-07-10 PROCEDURE — 97535 SELF CARE MNGMENT TRAINING: CPT | Performed by: PHYSICAL THERAPIST

## 2019-07-10 PROCEDURE — 97530 THERAPEUTIC ACTIVITIES: CPT | Performed by: PHYSICAL THERAPIST

## 2019-07-10 NOTE — PROGRESS NOTES
Daily Note     Today's date: 7/10/2019  Patient name: Jesus Herrera  : 1970  MRN: [id number]  Referring provider: Jose hCa MD  Dx:   Encounter Diagnosis     ICD-10-CM    1  History of right above knee amputation ([address] ) Z89 611    2  Abnormality of gait R26 9                   Subjective:  Patient reports his low back is sore with 4/10 pain levels but denies any symptoms in the right residual leg  Patient states he has been trying to wear the leg more and stand but states he has not been doing any laying on his stomach  Patient reports he is feeling more confident while wearing the prosthetic leg  Objective: See treatment diary below    Precautions  Fall risk, WBAT right LE with prosthetic limb       Manual  7/3 7/10      N/A                                            Exercise Diary  7/3 7/10       Sit to stand transfers with RW   5x       Stand WS   10x       Ambulation with prosthetic limb in parallel bars  15 min  30 min       Prone hip extension   2x15 Right only       REYES   1 min       Bridge        DD seated hip march   No DD  2x10 Bilat   Right Only       DD seated Trunk rotation and PNF         Prone lay with PT overpressure on hips   3x30" Hold                               HEP update/review   X                                                                  Modalities 7/3  7/10       MHP or CP to the LB in seated  PRN  NT                                Assessment: Tolerated treatment fair  PT notes start of TE program with focus on gait/balance to improve functional limitations to meet therapy goals  PT notes patient with improved performance and understanding with locking and unlocking of prosthetic knee joint  PT notes the patient with demonstration of poor UB posture with FB of trunk and anterior residual limb positioning with need for verbal and manual cues for proper positioning with need for continuation of skilled therapy  Plan: Continue per plan of care

## 2019-07-15 ENCOUNTER — APPOINTMENT (OUTPATIENT)
Dept: PHYSICAL THERAPY | Facility: CLINIC | Age: 49
End: 2019-07-15
Payer: COMMERCIAL

## 2019-07-17 ENCOUNTER — OFFICE VISIT (OUTPATIENT)
Dept: PHYSICAL THERAPY | Facility: CLINIC | Age: 49
End: 2019-07-17
Payer: COMMERCIAL

## 2019-07-17 DIAGNOSIS — E10.22 TYPE 1 DIABETES MELLITUS WITH STAGE 5 CHRONIC KIDNEY DISEASE NOT ON CHRONIC DIALYSIS (HCC): ICD-10-CM

## 2019-07-17 DIAGNOSIS — Z89.611 HISTORY OF RIGHT ABOVE KNEE AMPUTATION (HCC): Primary | ICD-10-CM

## 2019-07-17 DIAGNOSIS — R26.9 ABNORMALITY OF GAIT: ICD-10-CM

## 2019-07-17 DIAGNOSIS — N18.5 TYPE 1 DIABETES MELLITUS WITH STAGE 5 CHRONIC KIDNEY DISEASE NOT ON CHRONIC DIALYSIS (HCC): ICD-10-CM

## 2019-07-17 PROCEDURE — 97110 THERAPEUTIC EXERCISES: CPT | Performed by: PHYSICAL THERAPIST

## 2019-07-17 PROCEDURE — 97116 GAIT TRAINING THERAPY: CPT | Performed by: PHYSICAL THERAPIST

## 2019-07-17 NOTE — PROGRESS NOTES
Daily Note     Today's date: 2019  Patient name: Franklyn Gastelum  : 1970  MRN: [id number]  Referring provider: Cesar German MD  Dx:   Encounter Diagnosis     ICD-10-CM    1  History of right above knee amputation ([facility] [address] ) Z89 611    2  Abnormality of gait R26 9                   Subjective:  Patient reports he trying to lay on stomach more  Patient reports his back is bothering him with standing and prone activities  Patient reports he is hoping to start walking with the RW soon  Objective: See treatment diary below    Precautions  Fall risk, WBAT right LE with prosthetic limb       Manual  7/3 7/10 7/17     N/A                                            Exercise Diary  7/3 7/10  7/17     Sit to stand transfers with RW   5x  5x     Stand WS   10x       Ambulation with prosthetic limb in parallel bars  15 min  30 min  30 min and with RW      Prone hip extension   2x15 Right only  2x15 Right only      REYES   1 min  1 min      Bridge        DD seated hip march   No DD  2x10 Bilat   Right Only  2x10 Bilat  No DD      DD seated Trunk rotation and PNF         Prone lay with PT overpressure on hips   3x30" Hold  5x30" Hold                              HEP update/review   X                                                                  Modalities 7/3  7/10  7/17     MHP or CP to the LB in seated  PRN  NT  NT                           Assessment: Tolerated treatment well  PT notes continuation of progression of TE program with focus on gait/balance to improve functional limitations to meet therapy goals  PT notes the patient with continuation of limitations with hip extension secondary to hip flexor tightness with need for continuation of skilled therapy  PT notes trial of ambulation with RW with no LOB but need for VC for proper WS to the right LE and proper UB posture with standing  Plan: Continue per plan of care

## 2019-07-19 ENCOUNTER — OFFICE VISIT (OUTPATIENT)
Dept: PHYSICAL THERAPY | Facility: CLINIC | Age: 49
End: 2019-07-19
Payer: COMMERCIAL

## 2019-07-19 DIAGNOSIS — Z89.611 HISTORY OF RIGHT ABOVE KNEE AMPUTATION (HCC): Primary | ICD-10-CM

## 2019-07-19 DIAGNOSIS — R26.9 ABNORMALITY OF GAIT: ICD-10-CM

## 2019-07-19 PROCEDURE — 97530 THERAPEUTIC ACTIVITIES: CPT | Performed by: PHYSICAL THERAPIST

## 2019-07-19 PROCEDURE — 97116 GAIT TRAINING THERAPY: CPT | Performed by: PHYSICAL THERAPIST

## 2019-07-19 NOTE — PROGRESS NOTES
Daily Note     Today's date: 2019  Patient name: Kenya Hoang  : 1970  MRN: [id number]  Referring provider: Jagjit Jin MD  Dx:   Encounter Diagnosis     ICD-10-CM    1  History of right above knee amputation ([facility] [address] ) Z89 611    2  Abnormality of gait R26 9                   Subjective:  Patient reports he is trying to walk and stand more at with the prosthetic leg  Patient reports he felt unstable with A/D curb with RW  Patient expresses understanding of concept to step to gait pattern with RW with elevations  Objective: See treatment diary below    Precautions  Fall risk, WBAT right LE with prosthetic limb       Manual  7/3 7/10 7/17 7/19    N/A                                            Exercise Diary  7/3 7/10  7/17 7/19     Sit to stand transfers with RW   5x  5x 5X    Stand WS   10x       Ambulation with prosthetic limb in parallel bars  15 min  30 min  30 min and with RW  30 min with RW   Stair/Curb training     Prone hip extension   2x15 Right only  2x15 Right only  2x15   Right Only     REYES   1 min  1 min  1 min     Bridge        DD seated hip march   No DD  2x10 Bilat   Right Only  2x10 Bilat  No DD  On DD  2x10 Bilat     DD seated Trunk rotation and PNF         Prone lay with PT overpressure on hips   3x30" Hold  5x30" Hold  5 min   Prone lay with roll under residual leg and 10# weight on posterior hip                             HEP update/review   X                                                                  Modalities 7/3  7/10  7/17 7/19     MHP or CP to the LB in seated  PRN  NT  NT NT                              Assessment: Tolerated treatment fair  PT notes patient instructed in step to gait pattern with RW for stairs and elevations with demonstration of 1 LOB during descending on outside curb with need PT assistance to maintain balance so PT will continue with education and training in gait pattern for return community ambulation and safety         Plan: Continue per plan of care

## 2019-07-20 ENCOUNTER — APPOINTMENT (OUTPATIENT)
Dept: LAB | Facility: HOSPITAL | Age: 49
End: 2019-07-20
Payer: COMMERCIAL

## 2019-07-20 ENCOUNTER — TRANSCRIBE ORDERS (OUTPATIENT)
Dept: ADMINISTRATIVE | Facility: HOSPITAL | Age: 49
End: 2019-07-20

## 2019-07-20 DIAGNOSIS — Z94.0 KIDNEY REPLACED BY TRANSPLANT: ICD-10-CM

## 2019-07-20 DIAGNOSIS — N25.81 SECONDARY HYPERPARATHYROIDISM OF RENAL ORIGIN (HCC): ICD-10-CM

## 2019-07-20 DIAGNOSIS — Z94.0 KIDNEY REPLACED BY TRANSPLANT: Primary | ICD-10-CM

## 2019-07-20 LAB
ALBUMIN SERPL BCP-MCNC: 3 G/DL (ref 3.5–5)
ALP SERPL-CCNC: 90 U/L (ref 46–116)
ALT SERPL W P-5'-P-CCNC: 20 U/L (ref 12–78)
ANION GAP SERPL CALCULATED.3IONS-SCNC: 10 MMOL/L (ref 4–13)
AST SERPL W P-5'-P-CCNC: 13 U/L (ref 5–45)
BASOPHILS # BLD AUTO: 0.03 THOUSANDS/ΜL (ref 0–0.1)
BASOPHILS NFR BLD AUTO: 0 % (ref 0–1)
BILIRUB SERPL-MCNC: 0.4 MG/DL (ref 0.2–1)
BUN SERPL-MCNC: 91 MG/DL (ref 5–25)
CALCIUM SERPL-MCNC: 9.8 MG/DL (ref 8.3–10.1)
CHLORIDE SERPL-SCNC: 106 MMOL/L (ref 100–108)
CO2 SERPL-SCNC: 21 MMOL/L (ref 21–32)
CREAT SERPL-MCNC: 4.57 MG/DL (ref 0.6–1.3)
EOSINOPHIL # BLD AUTO: 0.33 THOUSAND/ΜL (ref 0–0.61)
EOSINOPHIL NFR BLD AUTO: 4 % (ref 0–6)
ERYTHROCYTE [DISTWIDTH] IN BLOOD BY AUTOMATED COUNT: 13.3 % (ref 11.6–15.1)
GFR SERPL CREATININE-BSD FRML MDRD: 14 ML/MIN/1.73SQ M
GLUCOSE P FAST SERPL-MCNC: 101 MG/DL (ref 65–99)
HCT VFR BLD AUTO: 26.2 % (ref 36.5–49.3)
HGB BLD-MCNC: 8.3 G/DL (ref 12–17)
IMM GRANULOCYTES # BLD AUTO: 0.02 THOUSAND/UL (ref 0–0.2)
IMM GRANULOCYTES NFR BLD AUTO: 0 % (ref 0–2)
LYMPHOCYTES # BLD AUTO: 2.66 THOUSANDS/ΜL (ref 0.6–4.47)
LYMPHOCYTES NFR BLD AUTO: 34 % (ref 14–44)
MCH RBC QN AUTO: 31 PG (ref 26.8–34.3)
MCHC RBC AUTO-ENTMCNC: 31.7 G/DL (ref 31.4–37.4)
MCV RBC AUTO: 98 FL (ref 82–98)
MONOCYTES # BLD AUTO: 0.7 THOUSAND/ΜL (ref 0.17–1.22)
MONOCYTES NFR BLD AUTO: 9 % (ref 4–12)
NEUTROPHILS # BLD AUTO: 4.12 THOUSANDS/ΜL (ref 1.85–7.62)
NEUTS SEG NFR BLD AUTO: 53 % (ref 43–75)
NRBC BLD AUTO-RTO: 0 /100 WBCS
PHOSPHATE SERPL-MCNC: 4.4 MG/DL (ref 2.7–4.5)
PLATELET # BLD AUTO: 237 THOUSANDS/UL (ref 149–390)
PMV BLD AUTO: 8 FL (ref 8.9–12.7)
POTASSIUM SERPL-SCNC: 4.9 MMOL/L (ref 3.5–5.3)
PROT SERPL-MCNC: 6.7 G/DL (ref 6.4–8.2)
PTH-INTACT SERPL-MCNC: 41.3 PG/ML (ref 18.4–80.1)
RBC # BLD AUTO: 2.68 MILLION/UL (ref 3.88–5.62)
SODIUM SERPL-SCNC: 137 MMOL/L (ref 136–145)
WBC # BLD AUTO: 7.86 THOUSAND/UL (ref 4.31–10.16)

## 2019-07-20 PROCEDURE — 80053 COMPREHEN METABOLIC PANEL: CPT

## 2019-07-20 PROCEDURE — 36415 COLL VENOUS BLD VENIPUNCTURE: CPT

## 2019-07-20 PROCEDURE — 83970 ASSAY OF PARATHORMONE: CPT

## 2019-07-20 PROCEDURE — 85025 COMPLETE CBC W/AUTO DIFF WBC: CPT

## 2019-07-20 PROCEDURE — 80197 ASSAY OF TACROLIMUS: CPT

## 2019-07-20 PROCEDURE — 84100 ASSAY OF PHOSPHORUS: CPT

## 2019-07-21 LAB — TACROLIMUS BLD-MCNC: 5 NG/ML (ref 2–20)

## 2019-07-24 ENCOUNTER — OFFICE VISIT (OUTPATIENT)
Dept: PHYSICAL THERAPY | Facility: CLINIC | Age: 49
End: 2019-07-24
Payer: COMMERCIAL

## 2019-07-24 DIAGNOSIS — R26.9 ABNORMALITY OF GAIT: ICD-10-CM

## 2019-07-24 DIAGNOSIS — Z89.611 HISTORY OF RIGHT ABOVE KNEE AMPUTATION (HCC): Primary | ICD-10-CM

## 2019-07-24 PROCEDURE — 97530 THERAPEUTIC ACTIVITIES: CPT

## 2019-07-24 PROCEDURE — 97116 GAIT TRAINING THERAPY: CPT

## 2019-07-24 NOTE — PROGRESS NOTES
Daily Note     Today's date: 2019  Patient name: Carey White  : 1970  MRN: [id number]  Referring provider: Leigh Carrasquillo MD  Dx:   Encounter Diagnosis     ICD-10-CM    1  History of right above knee amputation ([facility] [address] ) Z89 611    2  Abnormality of gait R26 9                   Subjective: patient stated the Dr wants him to start walking at home more  Objective: See treatment diary below    Precautions  Fall risk, WBAT right LE with prosthetic limb       Manual  7/3 7/10 7/17 7/19 7/24   N/A                                            Exercise Diary  7/3 7/10  7/17 7/19  7/24   Sit to stand transfers with RW   5x  5x 5X 5x   Stand WS   10x       Ambulation with prosthetic limb in parallel bars  15 min  30 min  30 min and with RW  30 min with RW   Stair/Curb training  30 min and with RW    Prone hip extension   2x15 Right only  2x15 Right only  2x15   Right Only  2x15  Right only    REYES   1 min  1 min  1 min  2 min for 2 trials   Bridge        DD seated hip march   No DD  2x10 Bilat   Right Only  2x10 Bilat  No DD  On DD  2x10 Bilat  On DD  2x10 bilat   DD seated Trunk rotation and PNF         Prone lay with PT overpressure on hips   3x30" Hold  5x30" Hold  5 min   Prone lay with roll under residual leg and 10# weight on posterior hip  5 min prone lay with roll under residual ligh an 10# weight on posterior hip                            HEP update/review   X                                                                  Modalities 7/3  7/10  7/17 7/19  7/24   MHP or CP to the LB in seated  PRN  NT  NT NT NT                       Assessment: patient amb with step to gait pattern with head down and forward trunk flexion while using RW  Practiced correcting these gait deviations in // bars  Patient presents with hip flexion contractor that therapy is working on with prone exercise but is hampered by low back pain  Patient also has no heel strike on left LE due to lack DF   Will continue to monitor gait deviations and strength deficits and progress as able  Plan: Continue per plan of care  Progress treatment as tolerated

## 2019-07-26 ENCOUNTER — OFFICE VISIT (OUTPATIENT)
Dept: PHYSICAL THERAPY | Facility: CLINIC | Age: 49
End: 2019-07-26
Payer: COMMERCIAL

## 2019-07-26 DIAGNOSIS — R26.9 ABNORMALITY OF GAIT: ICD-10-CM

## 2019-07-26 DIAGNOSIS — Z89.611 HISTORY OF RIGHT ABOVE KNEE AMPUTATION (HCC): Primary | ICD-10-CM

## 2019-07-26 PROCEDURE — 97530 THERAPEUTIC ACTIVITIES: CPT

## 2019-07-26 PROCEDURE — 97116 GAIT TRAINING THERAPY: CPT

## 2019-07-26 NOTE — PROGRESS NOTES
Daily Note     Today's date: 2019  Patient name: Kenya Hoang  : 1970  MRN: [id number]  Referring provider: Jagjit Jin MD  Dx:   Encounter Diagnosis     ICD-10-CM    1  History of right above knee amputation ([facility] [address] ) Z89 611    2  Abnormality of gait R26 9                   Subjective: patient stated he plans to work on walking over the weekend since his Dr has give him the okay  Objective: See treatment diary below    Precautions  Fall risk, WBAT right LE with prosthetic limb       Manual  7/26 7/10 7/17 7/19 7/24   N/A                                            Exercise Diary  7/26 7/10  7/17 7/19  7/24   Sit to stand transfers with RW  10x 5x  5x 5X 5x   Stand WS   10x       Ambulation with prosthetic limb in parallel bars  30 min  30 min  30 min and with RW  30 min with RW   Stair/Curb training  30 min and with RW    Prone hip extension  2x15 right only  2x15 Right only  2x15 Right only  2x15   Right Only  2x15  Right only    REYES  2 min 2 trials  1 min  1 min  1 min  2 min for 2 trials   Bridge        DD seated hip march  No DD  2x10 right only  No DD  2x10 Bilat   Right Only  2x10 Bilat  No DD  On DD  2x10 Bilat  On DD  2x10 bilat   DD seated Trunk rotation and PNF         Prone lay with PT overpressure on hips  NT today due time constraint  3x30" Hold  5x30" Hold  5 min   Prone lay with roll under residual leg and 10# weight on posterior hip  5 min prone lay with roll under residual ligh an 10# weight on posterior hip                            HEP update/review   X                                                                  Modalities 7/26 7/10  7/17 7/19  7/24   MHP or CP to the LB in seated  NT NT  NT NT NT                         Assessment: worked on transitioning skills practiced in parallel bars to use with RW having patient amb with a step thought gait pattern to a step through gait pattern  Also practiced wt shifting to sidestep in // bars   Will continue to work on gait and balance in upcoming visits  Plan: Continue per plan of care  Progress treatment as tolerated

## 2019-07-31 ENCOUNTER — APPOINTMENT (OUTPATIENT)
Dept: PHYSICAL THERAPY | Facility: CLINIC | Age: 49
End: 2019-07-31
Payer: COMMERCIAL

## 2019-08-02 ENCOUNTER — EVALUATION (OUTPATIENT)
Dept: PHYSICAL THERAPY | Facility: CLINIC | Age: 49
End: 2019-08-02
Payer: COMMERCIAL

## 2019-08-02 DIAGNOSIS — R26.9 ABNORMALITY OF GAIT: ICD-10-CM

## 2019-08-02 DIAGNOSIS — Z89.611 HISTORY OF RIGHT ABOVE KNEE AMPUTATION (HCC): Primary | ICD-10-CM

## 2019-08-02 PROCEDURE — 97110 THERAPEUTIC EXERCISES: CPT

## 2019-08-02 PROCEDURE — 97116 GAIT TRAINING THERAPY: CPT | Performed by: PHYSICAL THERAPIST

## 2019-08-02 PROCEDURE — 97116 GAIT TRAINING THERAPY: CPT

## 2019-08-02 NOTE — PROGRESS NOTES
Daily Note     Today's date: 2019  Patient name: Betsy Grimaldo  : 1970  MRN: [id number]  Referring provider: Viraj Olsen MD  Dx:   Encounter Diagnosis     ICD-10-CM    1  History of right above knee amputation ([facility] [address] ) Z89 611    2  Abnormality of gait R26 9        Start Time: 0115          Subjective: Patient stated he has been walking at home and it is getting better  Patient has not walked outside yet with prosthetic  Objective: See treatment diary below      Assessment: Unable to complete full program due to time constraints  Patient arrived 15 min late for appointment  Patient continues to present with significant hip flexor contractor  Patient has difficulty amb with a step through pattern with RW but does will with it in // bars  See Re-Eval for details  Plan: Continue per plan of care  Progress treatment as tolerated           Precautions  Fall risk, WBAT right LE with prosthetic limb       Manual     N/A                                            Exercise Diary     Sit to stand transfers with RW  10x 10x 5x 5X 5x   Stand SLR R hip extension with R on 4" box     2x10        Ambulation with prosthetic limb in parallel bars  30 min  30 min  30 min and with RW  30 min with RW   Stair/Curb training  30 min and with RW    Prone hip extension  2x15 right only  2x15 Right only  2x15 Right only  2x15   Right Only  2x15  Right only    REYES  2 min 2 trials  2 min 2 trials 1 min  1 min  2 min for 2 trials   Bridge        DD seated hip march  NT today due to time constraint  No DD  2x10 Bilat   Right Only  2x10 Bilat  No DD  On DD  2x10 Bilat  On DD  2x10 bilat   DD seated Trunk rotation and PNF         Prone lay with PT overpressure on hips  NT today due time constraint  NT today due today due to time constraints 5x30" Hold  5 min   Prone lay with roll under residual leg and 10# weight on posterior hip  5 min prone lay with roll under residual ligh an 10# weight on posterior hip                            HEP update/review   X                                                                  Modalities 7/26 8/2 7/17 7/19 7/24   MHP or CP to the LB in seated  NT NT  NT NT NT

## 2019-08-02 NOTE — PROGRESS NOTES
PT Discharge    Today's date: 2019  Patient name: Jose Atkinson  : 1970  MRN: [id number]  Referring provider: Albina Reeder MD  Dx:   Encounter Diagnosis     ICD-10-CM    1  History of right above knee amputation ([facility] [address] ) Z89 611    2  Abnormality of gait R26 9        Start Time: 0115          Assessment  Assessment details: PT will comply with patient request and DC with HEP secondary to transportation issues  Impairments: abnormal gait, abnormal or restricted ROM, activity intolerance, impaired balance, impaired physical strength, lacks appropriate home exercise program and weight-bearing intolerance  Understanding of Dx/Px/POC: good   Prognosis: fair    Goals  ST  Initiate HEP-MET  2  Patient will ambulate 100+ feet with RW with min A/CG-MET  3  Increase ROM to WNL with hip extension-Partial MET  4  Increase strength by 1-2 mm grades-Partial MET  5  Improve postural awareness with decrease FB of trunk with standing and walking position-Partial MET  LT  Patient MI with ambulation with RW/SPC-Progressing   2  Improve postural awareness with standing and walking activity-Partial MET  3   MI with all transfers-Partial MET  4  Decrease limitations with ADL-Partial MET  5   DC with HEP-Progressing     Plan  Plan details: DC with HEP     Patient would benefit from: PT eval and skilled physical therapy  Planned modality interventions: cryotherapy and thermotherapy: hydrocollator packs  Planned therapy interventions: neuromuscular re-education, balance, balance/weight bearing training, patient education, postural training, self care, strengthening, stretching, therapeutic exercise, home exercise program, graded exercise, gait training, functional ROM exercises and flexibility  Frequency: 2x week  Duration in visits: 16  Duration in weeks: 8  Treatment plan discussed with: patient        Subjective Evaluation    History of Present Illness  Date of surgery: 2019  Mechanism of injury: surgery  Mechanism of injury: PT notes the patient contacted clinic and informed PT that he will not be able to attend skilled therapy secondary to transportation limitations  Pain  Current pain ratin  At best pain ratin  At worst pain ratin  Location: Patient denies any pain in the right LE  Relieving factors: rest  Aggravating factors: standing and walking    Treatments  Current treatment: physical therapy  Patient Goals  Patient goals for therapy: improved balance, increased motion, increased strength, independence with ADLs/IADLs and return to sport/leisure activities          Objective     Palpation     Additional Palpation Details  No pain with palpation of the right residual limb with no signs of infection and no skin break down noted     Active Range of Motion   Left Hip   Normal active range of motion    Right Hip   Flexion: WFL  Extension: -4 degrees   Abduction: WFL  Adduction: WFL    Additional Active Range of Motion Details  PT notes lack of hip extension     Passive Range of Motion     Right Hip   Extension: 3 degrees     Strength/Myotome Testing     Left Hip   Normal muscle strength    Right Hip   Planes of Motion   Flexion: 4  Extension: 4+  Abduction: 4  Adduction: 4  External rotation: 4  Internal rotation: 4    Additional Strength Details  PT notes decrease ROM and strength t/o the right hip     Ambulation     Observational Gait   Gait: asymmetric   Decreased walking speed, stride length, right stance time, right swing time and right step length  Additional Observational Gait Details  PT notes demonstration of impaired gait pattern with right prosthetic limb with FB of trunk, step to pattern, decrease step length on the right, decrease stance on the right, and decrease simba with rating of fair- with static and dynamic activities with need for CG from PT to ambulate with RW  PT notes TUG time of 48 22 seconds with ambulation in the parallel bars with CG/S from PT       General Comments:      Hip Comments   PT notes no pistoning noted with wearing of the right prosthetic limb but positioning of right hip flexion with standing positioning in the prosthetic limb with knee joint placed on the posterior distal surface of the prosthetic limb              Precautions  Fall risk, WBAT right LE with prosthetic limb

## 2019-08-09 ENCOUNTER — APPOINTMENT (OUTPATIENT)
Dept: PHYSICAL THERAPY | Facility: CLINIC | Age: 49
End: 2019-08-09
Payer: COMMERCIAL

## 2019-08-09 ENCOUNTER — TELEPHONE (OUTPATIENT)
Dept: FAMILY MEDICINE CLINIC | Facility: CLINIC | Age: 49
End: 2019-08-09

## 2019-08-09 NOTE — TELEPHONE ENCOUNTER
Asking for New orders for Pt - but then called back stating that Pt would be best to change office but stay with Out-pt facility    Will call back if he needs different orders due to inability to get to appts

## 2019-09-23 ENCOUNTER — APPOINTMENT (EMERGENCY)
Dept: RADIOLOGY | Facility: HOSPITAL | Age: 49
End: 2019-09-23
Payer: COMMERCIAL

## 2019-09-23 ENCOUNTER — HOSPITAL ENCOUNTER (EMERGENCY)
Facility: HOSPITAL | Age: 49
End: 2019-09-24
Attending: EMERGENCY MEDICINE | Admitting: EMERGENCY MEDICINE
Payer: COMMERCIAL

## 2019-09-23 DIAGNOSIS — E87.5 HYPERKALEMIA: ICD-10-CM

## 2019-09-23 DIAGNOSIS — N17.9 ACUTE KIDNEY INJURY (HCC): ICD-10-CM

## 2019-09-23 DIAGNOSIS — K92.2 UPPER GI BLEED: Primary | ICD-10-CM

## 2019-09-23 DIAGNOSIS — D62 ACUTE BLOOD LOSS ANEMIA: ICD-10-CM

## 2019-09-23 DIAGNOSIS — Z97.8 ENDOTRACHEALLY INTUBATED: ICD-10-CM

## 2019-09-23 DIAGNOSIS — E87.2 METABOLIC ACIDOSIS: ICD-10-CM

## 2019-09-23 LAB
ALBUMIN SERPL BCP-MCNC: 1.9 G/DL (ref 3.5–5)
ALP SERPL-CCNC: 61 U/L (ref 46–116)
ALT SERPL W P-5'-P-CCNC: 14 U/L (ref 12–78)
ANION GAP SERPL CALCULATED.3IONS-SCNC: 18 MMOL/L (ref 4–13)
APTT PPP: 36 SECONDS (ref 23–37)
AST SERPL W P-5'-P-CCNC: 15 U/L (ref 5–45)
BASE EX.OXY STD BLDV CALC-SCNC: 93.5 % (ref 60–80)
BASE EXCESS BLDA CALC-SCNC: -20.2 MMOL/L
BASE EXCESS BLDV CALC-SCNC: -21.7 MMOL/L
BASOPHILS # BLD AUTO: 0.01 THOUSANDS/ΜL (ref 0–0.1)
BASOPHILS NFR BLD AUTO: 0 % (ref 0–1)
BILIRUB SERPL-MCNC: 0.3 MG/DL (ref 0.2–1)
BUN SERPL-MCNC: 154 MG/DL (ref 5–25)
CALCIUM SERPL-MCNC: 6.7 MG/DL (ref 8.3–10.1)
CHLORIDE SERPL-SCNC: 106 MMOL/L (ref 100–108)
CO2 SERPL-SCNC: 9 MMOL/L (ref 21–32)
CREAT SERPL-MCNC: 6.9 MG/DL (ref 0.6–1.3)
EOSINOPHIL # BLD AUTO: 0.03 THOUSAND/ΜL (ref 0–0.61)
EOSINOPHIL NFR BLD AUTO: 0 % (ref 0–6)
ERYTHROCYTE [DISTWIDTH] IN BLOOD BY AUTOMATED COUNT: 15.2 % (ref 11.6–15.1)
GFR SERPL CREATININE-BSD FRML MDRD: 9 ML/MIN/1.73SQ M
GLUCOSE SERPL-MCNC: 307 MG/DL (ref 65–140)
HCO3 BLDA-SCNC: 7.2 MMOL/L (ref 22–28)
HCO3 BLDV-SCNC: 6.2 MMOL/L (ref 24–30)
HCT VFR BLD AUTO: 9.3 % (ref 36.5–49.3)
HGB BLD-MCNC: 2.7 G/DL (ref 12–17)
IMM GRANULOCYTES # BLD AUTO: 0.21 THOUSAND/UL (ref 0–0.2)
IMM GRANULOCYTES NFR BLD AUTO: 2 % (ref 0–2)
INR PPP: 1.31 (ref 0.84–1.19)
LACTATE SERPL-SCNC: 1.6 MMOL/L (ref 0.5–2)
LACTATE SERPL-SCNC: 3.7 MMOL/L (ref 0.5–2)
LYMPHOCYTES # BLD AUTO: 1.11 THOUSANDS/ΜL (ref 0.6–4.47)
LYMPHOCYTES NFR BLD AUTO: 9 % (ref 14–44)
MCH RBC QN AUTO: 29.7 PG (ref 26.8–34.3)
MCHC RBC AUTO-ENTMCNC: 29 G/DL (ref 31.4–37.4)
MCV RBC AUTO: 102 FL (ref 82–98)
MONOCYTES # BLD AUTO: 0.29 THOUSAND/ΜL (ref 0.17–1.22)
MONOCYTES NFR BLD AUTO: 2 % (ref 4–12)
NASAL CANNULA: ABNORMAL
NEUTROPHILS # BLD AUTO: 11.12 THOUSANDS/ΜL (ref 1.85–7.62)
NEUTS SEG NFR BLD AUTO: 87 % (ref 43–75)
NRBC BLD AUTO-RTO: 0 /100 WBCS
O2 CT BLDA-SCNC: 1.6 ML/DL (ref 16–23)
O2 CT BLDV-SCNC: 5.4 ML/DL
OXYHGB MFR BLDA: 33.6 % (ref 94–97)
PCO2 BLDA: 23.2 MM HG (ref 36–44)
PCO2 BLDV: 22 MM HG (ref 42–50)
PH BLDA: 7.11 [PH] (ref 7.35–7.45)
PH BLDV: 7.07 [PH] (ref 7.3–7.4)
PLATELET # BLD AUTO: 338 THOUSANDS/UL (ref 149–390)
PMV BLD AUTO: 8.3 FL (ref 8.9–12.7)
PO2 BLDA: 25.6 MM HG (ref 75–129)
PO2 BLDV: 147.9 MM HG (ref 35–45)
POTASSIUM SERPL-SCNC: 6.1 MMOL/L (ref 3.5–5.3)
PROT SERPL-MCNC: 4.5 G/DL (ref 6.4–8.2)
PROTHROMBIN TIME: 16.4 SECONDS (ref 11.6–14.5)
RBC # BLD AUTO: 0.91 MILLION/UL (ref 3.88–5.62)
SODIUM SERPL-SCNC: 133 MMOL/L (ref 136–145)
SPECIMEN SOURCE: ABNORMAL
WBC # BLD AUTO: 12.77 THOUSAND/UL (ref 4.31–10.16)

## 2019-09-23 PROCEDURE — 86900 BLOOD TYPING SEROLOGIC ABO: CPT | Performed by: EMERGENCY MEDICINE

## 2019-09-23 PROCEDURE — 81001 URINALYSIS AUTO W/SCOPE: CPT | Performed by: EMERGENCY MEDICINE

## 2019-09-23 PROCEDURE — 82805 BLOOD GASES W/O2 SATURATION: CPT | Performed by: EMERGENCY MEDICINE

## 2019-09-23 PROCEDURE — 80197 ASSAY OF TACROLIMUS: CPT | Performed by: EMERGENCY MEDICINE

## 2019-09-23 PROCEDURE — 80053 COMPREHEN METABOLIC PANEL: CPT | Performed by: EMERGENCY MEDICINE

## 2019-09-23 PROCEDURE — P9016 RBC LEUKOCYTES REDUCED: HCPCS

## 2019-09-23 PROCEDURE — 83605 ASSAY OF LACTIC ACID: CPT | Performed by: EMERGENCY MEDICINE

## 2019-09-23 PROCEDURE — 99291 CRITICAL CARE FIRST HOUR: CPT

## 2019-09-23 PROCEDURE — 86901 BLOOD TYPING SEROLOGIC RH(D): CPT | Performed by: EMERGENCY MEDICINE

## 2019-09-23 PROCEDURE — 31500 INSERT EMERGENCY AIRWAY: CPT | Performed by: EMERGENCY MEDICINE

## 2019-09-23 PROCEDURE — 85610 PROTHROMBIN TIME: CPT | Performed by: EMERGENCY MEDICINE

## 2019-09-23 PROCEDURE — 36430 TRANSFUSION BLD/BLD COMPNT: CPT

## 2019-09-23 PROCEDURE — 71045 X-RAY EXAM CHEST 1 VIEW: CPT

## 2019-09-23 PROCEDURE — 93005 ELECTROCARDIOGRAM TRACING: CPT

## 2019-09-23 PROCEDURE — 96368 THER/DIAG CONCURRENT INF: CPT

## 2019-09-23 PROCEDURE — 84484 ASSAY OF TROPONIN QUANT: CPT | Performed by: EMERGENCY MEDICINE

## 2019-09-23 PROCEDURE — 96365 THER/PROPH/DIAG IV INF INIT: CPT

## 2019-09-23 PROCEDURE — 94002 VENT MGMT INPAT INIT DAY: CPT

## 2019-09-23 PROCEDURE — 87040 BLOOD CULTURE FOR BACTERIA: CPT | Performed by: EMERGENCY MEDICINE

## 2019-09-23 PROCEDURE — 36600 WITHDRAWAL OF ARTERIAL BLOOD: CPT

## 2019-09-23 PROCEDURE — 85025 COMPLETE CBC W/AUTO DIFF WBC: CPT | Performed by: EMERGENCY MEDICINE

## 2019-09-23 PROCEDURE — 36571 INSERT PICVAD CATH: CPT | Performed by: EMERGENCY MEDICINE

## 2019-09-23 PROCEDURE — 86923 COMPATIBILITY TEST ELECTRIC: CPT

## 2019-09-23 PROCEDURE — 85730 THROMBOPLASTIN TIME PARTIAL: CPT | Performed by: EMERGENCY MEDICINE

## 2019-09-23 PROCEDURE — 86850 RBC ANTIBODY SCREEN: CPT | Performed by: EMERGENCY MEDICINE

## 2019-09-23 PROCEDURE — 36415 COLL VENOUS BLD VENIPUNCTURE: CPT | Performed by: EMERGENCY MEDICINE

## 2019-09-23 PROCEDURE — 99291 CRITICAL CARE FIRST HOUR: CPT | Performed by: EMERGENCY MEDICINE

## 2019-09-23 PROCEDURE — 84145 PROCALCITONIN (PCT): CPT | Performed by: EMERGENCY MEDICINE

## 2019-09-23 PROCEDURE — 94660 CPAP INITIATION&MGMT: CPT

## 2019-09-23 PROCEDURE — 96366 THER/PROPH/DIAG IV INF ADDON: CPT

## 2019-09-23 RX ORDER — CALCIUM CHLORIDE 100 MG/ML
1 INJECTION INTRAVENOUS; INTRAVENTRICULAR ONCE
Status: COMPLETED | OUTPATIENT
Start: 2019-09-24 | End: 2019-09-24

## 2019-09-23 RX ORDER — ETOMIDATE 2 MG/ML
10 INJECTION INTRAVENOUS ONCE
Status: COMPLETED | OUTPATIENT
Start: 2019-09-23 | End: 2019-09-23

## 2019-09-23 RX ORDER — CEFEPIME HYDROCHLORIDE 1 G/50ML
1000 INJECTION, SOLUTION INTRAVENOUS ONCE
Status: COMPLETED | OUTPATIENT
Start: 2019-09-23 | End: 2019-09-23

## 2019-09-23 RX ORDER — FENTANYL CITRATE 50 UG/ML
25 INJECTION, SOLUTION INTRAMUSCULAR; INTRAVENOUS ONCE
Status: DISCONTINUED | OUTPATIENT
Start: 2019-09-23 | End: 2019-09-24 | Stop reason: HOSPADM

## 2019-09-23 RX ORDER — ALBUTEROL SULFATE 2.5 MG/3ML
5 SOLUTION RESPIRATORY (INHALATION) ONCE
Status: COMPLETED | OUTPATIENT
Start: 2019-09-24 | End: 2019-09-24

## 2019-09-23 RX ORDER — PROPOFOL 10 MG/ML
5-50 INJECTION, EMULSION INTRAVENOUS
Status: DISCONTINUED | OUTPATIENT
Start: 2019-09-23 | End: 2019-09-24 | Stop reason: HOSPADM

## 2019-09-23 RX ORDER — SUCCINYLCHOLINE/SOD CL,ISO/PF 100 MG/5ML
50 SYRINGE (ML) INTRAVENOUS ONCE
Status: COMPLETED | OUTPATIENT
Start: 2019-09-23 | End: 2019-09-23

## 2019-09-23 RX ORDER — VANCOMYCIN HYDROCHLORIDE 1 G/200ML
20 INJECTION, SOLUTION INTRAVENOUS ONCE
Status: COMPLETED | OUTPATIENT
Start: 2019-09-23 | End: 2019-09-23

## 2019-09-23 RX ADMIN — ETOMIDATE 10 MG: 2 INJECTION, SOLUTION INTRAVENOUS at 23:18

## 2019-09-23 RX ADMIN — VANCOMYCIN HYDROCHLORIDE 1000 MG: 1 INJECTION, SOLUTION INTRAVENOUS at 21:40

## 2019-09-23 RX ADMIN — Medication 50 MG: at 23:31

## 2019-09-23 RX ADMIN — SODIUM CHLORIDE, SODIUM LACTATE, POTASSIUM CHLORIDE, AND CALCIUM CHLORIDE 1000 ML: .6; .31; .03; .02 INJECTION, SOLUTION INTRAVENOUS at 20:38

## 2019-09-23 RX ADMIN — PROPOFOL 5 MCG/KG/MIN: 10 INJECTION, EMULSION INTRAVENOUS at 23:28

## 2019-09-23 RX ADMIN — SODIUM CHLORIDE, SODIUM LACTATE, POTASSIUM CHLORIDE, AND CALCIUM CHLORIDE 1000 ML: .6; .31; .03; .02 INJECTION, SOLUTION INTRAVENOUS at 20:41

## 2019-09-23 RX ADMIN — CEFEPIME HYDROCHLORIDE 1000 MG: 1 INJECTION, SOLUTION INTRAVENOUS at 20:54

## 2019-09-24 ENCOUNTER — HOSPITAL ENCOUNTER (INPATIENT)
Facility: HOSPITAL | Age: 49
LOS: 1 days | Discharge: ANOTHER HOSPITAL | DRG: 637 | End: 2019-09-24
Attending: INTERNAL MEDICINE | Admitting: INTERNAL MEDICINE
Payer: COMMERCIAL

## 2019-09-24 VITALS
OXYGEN SATURATION: 100 % | DIASTOLIC BLOOD PRESSURE: 58 MMHG | WEIGHT: 100.75 LBS | TEMPERATURE: 94.6 F | RESPIRATION RATE: 21 BRPM | BODY MASS INDEX: 17.29 KG/M2 | SYSTOLIC BLOOD PRESSURE: 127 MMHG | HEART RATE: 80 BPM

## 2019-09-24 VITALS
DIASTOLIC BLOOD PRESSURE: 66 MMHG | OXYGEN SATURATION: 100 % | HEART RATE: 80 BPM | TEMPERATURE: 98.6 F | SYSTOLIC BLOOD PRESSURE: 146 MMHG | HEIGHT: 64 IN | RESPIRATION RATE: 22 BRPM | WEIGHT: 103.17 LBS | BODY MASS INDEX: 17.61 KG/M2

## 2019-09-24 DIAGNOSIS — E10.10 DIABETIC KETOACIDOSIS WITHOUT COMA ASSOCIATED WITH TYPE 1 DIABETES MELLITUS (HCC): ICD-10-CM

## 2019-09-24 DIAGNOSIS — E10.21 TYPE 1 DIABETES MELLITUS WITH NEPHROPATHY (HCC): ICD-10-CM

## 2019-09-24 DIAGNOSIS — K92.0 GASTROINTESTINAL HEMORRHAGE WITH HEMATEMESIS: ICD-10-CM

## 2019-09-24 DIAGNOSIS — Z94.0 RENAL TRANSPLANT, STATUS POST: Primary | ICD-10-CM

## 2019-09-24 DIAGNOSIS — J96.00 ACUTE RESPIRATORY FAILURE (HCC): ICD-10-CM

## 2019-09-24 PROBLEM — E87.20 METABOLIC ACIDOSIS: Status: ACTIVE | Noted: 2018-12-20

## 2019-09-24 PROBLEM — D62 ACUTE BLOOD LOSS ANEMIA: Status: ACTIVE | Noted: 2019-09-24

## 2019-09-24 LAB
ABO GROUP BLD BPU: NORMAL
ABO GROUP BLD BPU: NORMAL
ABO GROUP BLD: NORMAL
ABO GROUP BLD: NORMAL
ALBUMIN SERPL BCP-MCNC: 1.9 G/DL (ref 3.5–5)
ALBUMIN SERPL BCP-MCNC: 2.1 G/DL (ref 3.5–5)
ALP SERPL-CCNC: 59 U/L (ref 46–116)
ALP SERPL-CCNC: 66 U/L (ref 46–116)
ALT SERPL W P-5'-P-CCNC: 14 U/L (ref 12–78)
ALT SERPL W P-5'-P-CCNC: 14 U/L (ref 12–78)
AMORPH URATE CRY URNS QL MICRO: ABNORMAL /HPF
ANION GAP SERPL CALCULATED.3IONS-SCNC: 18 MMOL/L (ref 4–13)
ANION GAP SERPL CALCULATED.3IONS-SCNC: 19 MMOL/L (ref 4–13)
ANION GAP SERPL CALCULATED.3IONS-SCNC: 20 MMOL/L (ref 4–13)
APTT PPP: 35 SECONDS (ref 23–37)
ARTERIAL PATENCY WRIST A: YES
AST SERPL W P-5'-P-CCNC: 21 U/L (ref 5–45)
AST SERPL W P-5'-P-CCNC: 24 U/L (ref 5–45)
BACTERIA UR QL AUTO: ABNORMAL /HPF
BASE EXCESS BLDA CALC-SCNC: -16.3 MMOL/L
BASE EXCESS BLDA CALC-SCNC: -20.5 MMOL/L
BASE EXCESS BLDA CALC-SCNC: -20.8 MMOL/L
BASOPHILS # BLD AUTO: 0.01 THOUSANDS/ΜL (ref 0–0.1)
BASOPHILS NFR BLD AUTO: 0 % (ref 0–1)
BILIRUB SERPL-MCNC: 0.54 MG/DL (ref 0.2–1)
BILIRUB SERPL-MCNC: 0.58 MG/DL (ref 0.2–1)
BILIRUB UR QL STRIP: NEGATIVE
BLD GP AB SCN SERPL QL: NEGATIVE
BLD GP AB SCN SERPL QL: NEGATIVE
BPU ID: NORMAL
BPU ID: NORMAL
BUN SERPL-MCNC: 145 MG/DL (ref 5–25)
BUN SERPL-MCNC: 147 MG/DL (ref 5–25)
BUN SERPL-MCNC: 153 MG/DL (ref 5–25)
CA-I BLD-SCNC: 1.12 MMOL/L (ref 1.12–1.32)
CA-I BLD-SCNC: 1.14 MMOL/L (ref 1.12–1.32)
CALCIUM SERPL-MCNC: 7.3 MG/DL (ref 8.3–10.1)
CALCIUM SERPL-MCNC: 7.6 MG/DL (ref 8.3–10.1)
CALCIUM SERPL-MCNC: 7.7 MG/DL (ref 8.3–10.1)
CHLORIDE SERPL-SCNC: 107 MMOL/L (ref 100–108)
CHLORIDE SERPL-SCNC: 107 MMOL/L (ref 100–108)
CHLORIDE SERPL-SCNC: 108 MMOL/L (ref 100–108)
CLARITY UR: CLEAR
CO2 SERPL-SCNC: 10 MMOL/L (ref 21–32)
CO2 SERPL-SCNC: 10 MMOL/L (ref 21–32)
CO2 SERPL-SCNC: 12 MMOL/L (ref 21–32)
COLOR UR: YELLOW
CREAT SERPL-MCNC: 6.19 MG/DL (ref 0.6–1.3)
CREAT SERPL-MCNC: 6.6 MG/DL (ref 0.6–1.3)
CREAT SERPL-MCNC: 6.67 MG/DL (ref 0.6–1.3)
CROSSMATCH: NORMAL
CROSSMATCH: NORMAL
EOSINOPHIL # BLD AUTO: 0 THOUSAND/ΜL (ref 0–0.61)
EOSINOPHIL NFR BLD AUTO: 0 % (ref 0–6)
ERYTHROCYTE [DISTWIDTH] IN BLOOD BY AUTOMATED COUNT: 13.9 % (ref 11.6–15.1)
ERYTHROCYTE [DISTWIDTH] IN BLOOD BY AUTOMATED COUNT: 15.2 % (ref 11.6–15.1)
GFR SERPL CREATININE-BSD FRML MDRD: 10 ML/MIN/1.73SQ M
GFR SERPL CREATININE-BSD FRML MDRD: 9 ML/MIN/1.73SQ M
GFR SERPL CREATININE-BSD FRML MDRD: 9 ML/MIN/1.73SQ M
GLUCOSE SERPL-MCNC: 113 MG/DL (ref 65–140)
GLUCOSE SERPL-MCNC: 126 MG/DL (ref 65–140)
GLUCOSE SERPL-MCNC: 192 MG/DL (ref 65–140)
GLUCOSE SERPL-MCNC: 274 MG/DL (ref 65–140)
GLUCOSE SERPL-MCNC: 311 MG/DL (ref 65–140)
GLUCOSE SERPL-MCNC: 331 MG/DL (ref 65–140)
GLUCOSE SERPL-MCNC: 350 MG/DL (ref 65–140)
GLUCOSE SERPL-MCNC: 354 MG/DL (ref 65–140)
GLUCOSE SERPL-MCNC: 402 MG/DL (ref 65–140)
GLUCOSE SERPL-MCNC: 417 MG/DL (ref 65–140)
GLUCOSE SERPL-MCNC: 74 MG/DL (ref 65–140)
GLUCOSE SERPL-MCNC: 88 MG/DL (ref 65–140)
GLUCOSE SERPL-MCNC: 98 MG/DL (ref 65–140)
GLUCOSE UR STRIP-MCNC: NEGATIVE MG/DL
HCO3 BLDA-SCNC: 7 MMOL/L (ref 22–28)
HCO3 BLDA-SCNC: 7.1 MMOL/L (ref 22–28)
HCO3 BLDA-SCNC: 9.2 MMOL/L (ref 22–28)
HCT VFR BLD AUTO: 22 % (ref 36.5–49.3)
HCT VFR BLD AUTO: 23.6 % (ref 36.5–49.3)
HGB BLD-MCNC: 6.9 G/DL (ref 12–17)
HGB BLD-MCNC: 7.5 G/DL (ref 12–17)
HGB UR QL STRIP.AUTO: ABNORMAL
HOROWITZ INDEX BLDA+IHG-RTO: 100 MM[HG]
HOROWITZ INDEX BLDA+IHG-RTO: 60 MM[HG]
IMM GRANULOCYTES # BLD AUTO: 0.08 THOUSAND/UL (ref 0–0.2)
IMM GRANULOCYTES NFR BLD AUTO: 1 % (ref 0–2)
INR PPP: 1.34 (ref 0.84–1.19)
KETONES UR STRIP-MCNC: NEGATIVE MG/DL
LACTATE SERPL-SCNC: 0.7 MMOL/L (ref 0.5–2)
LEUKOCYTE ESTERASE UR QL STRIP: NEGATIVE
LYMPHOCYTES # BLD AUTO: 1.92 THOUSANDS/ΜL (ref 0.6–4.47)
LYMPHOCYTES NFR BLD AUTO: 18 % (ref 14–44)
MAGNESIUM SERPL-MCNC: 1.9 MG/DL (ref 1.6–2.6)
MAGNESIUM SERPL-MCNC: 2.9 MG/DL (ref 1.6–2.6)
MCH RBC QN AUTO: 29.3 PG (ref 26.8–34.3)
MCH RBC QN AUTO: 30.8 PG (ref 26.8–34.3)
MCHC RBC AUTO-ENTMCNC: 31.4 G/DL (ref 31.4–37.4)
MCHC RBC AUTO-ENTMCNC: 31.8 G/DL (ref 31.4–37.4)
MCV RBC AUTO: 92 FL (ref 82–98)
MCV RBC AUTO: 98 FL (ref 82–98)
MONOCYTES # BLD AUTO: 0.8 THOUSAND/ΜL (ref 0.17–1.22)
MONOCYTES NFR BLD AUTO: 8 % (ref 4–12)
NEUTROPHILS # BLD AUTO: 7.7 THOUSANDS/ΜL (ref 1.85–7.62)
NEUTS SEG NFR BLD AUTO: 73 % (ref 43–75)
NITRITE UR QL STRIP: NEGATIVE
NON-SQ EPI CELLS URNS QL MICRO: ABNORMAL /HPF
NRBC BLD AUTO-RTO: 0 /100 WBCS
O2 CT BLDA-SCNC: 11.3 ML/DL (ref 16–23)
O2 CT BLDA-SCNC: 14 ML/DL (ref 16–23)
O2 CT BLDA-SCNC: 7.6 ML/DL (ref 16–23)
OXYHGB MFR BLDA: 82.5 % (ref 94–97)
OXYHGB MFR BLDA: 98.7 % (ref 94–97)
OXYHGB MFR BLDA: 99.1 % (ref 94–97)
PCO2 BLDA: 21.7 MM HG (ref 36–44)
PCO2 BLDA: 21.7 MM HG (ref 36–44)
PCO2 BLDA: 24.2 MM HG (ref 36–44)
PEEP RESPIRATORY: 5 CM[H2O]
PEEP RESPIRATORY: 5 CM[H2O]
PH BLDA: 7.08 [PH] (ref 7.35–7.45)
PH BLDA: 7.13 [PH] (ref 7.35–7.45)
PH BLDA: 7.25 [PH] (ref 7.35–7.45)
PH UR STRIP.AUTO: 5.5 [PH]
PHOSPHATE SERPL-MCNC: 5.9 MG/DL (ref 2.7–4.5)
PHOSPHATE SERPL-MCNC: 7.8 MG/DL (ref 2.7–4.5)
PLATELET # BLD AUTO: 171 THOUSANDS/UL (ref 149–390)
PLATELET # BLD AUTO: 180 THOUSANDS/UL (ref 149–390)
PMV BLD AUTO: 8.2 FL (ref 8.9–12.7)
PMV BLD AUTO: 8.8 FL (ref 8.9–12.7)
PO2 BLDA: 212.7 MM HG (ref 75–129)
PO2 BLDA: 43.1 MM HG (ref 75–129)
PO2 BLDA: 486.2 MM HG (ref 75–129)
POTASSIUM SERPL-SCNC: 3.9 MMOL/L (ref 3.5–5.3)
POTASSIUM SERPL-SCNC: 4.2 MMOL/L (ref 3.5–5.3)
POTASSIUM SERPL-SCNC: 4.6 MMOL/L (ref 3.5–5.3)
PROCALCITONIN SERPL-MCNC: 0.9 NG/ML
PROT SERPL-MCNC: 4.6 G/DL (ref 6.4–8.2)
PROT SERPL-MCNC: 5 G/DL (ref 6.4–8.2)
PROT UR STRIP-MCNC: ABNORMAL MG/DL
PROTHROMBIN TIME: 16.8 SECONDS (ref 11.6–14.5)
RBC # BLD AUTO: 2.24 MILLION/UL (ref 3.88–5.62)
RBC # BLD AUTO: 2.56 MILLION/UL (ref 3.88–5.62)
RBC #/AREA URNS AUTO: ABNORMAL /HPF
RH BLD: POSITIVE
RH BLD: POSITIVE
SODIUM SERPL-SCNC: 136 MMOL/L (ref 136–145)
SODIUM SERPL-SCNC: 137 MMOL/L (ref 136–145)
SODIUM SERPL-SCNC: 138 MMOL/L (ref 136–145)
SP GR UR STRIP.AUTO: 1.02 (ref 1–1.03)
SPECIMEN EXPIRATION DATE: NORMAL
SPECIMEN EXPIRATION DATE: NORMAL
SPECIMEN SOURCE: ABNORMAL
TACROLIMUS BLD-MCNC: 2.8 NG/ML (ref 2–20)
TACROLIMUS BLD-MCNC: 5.2 NG/ML (ref 2–20)
TC PEEP: 5
TC VENT FIO2: 40
TROPONIN I SERPL-MCNC: 0.8 NG/ML
TROPONIN I SERPL-MCNC: 1.78 NG/ML
TROPONIN I SERPL-MCNC: 14.01 NG/ML
TROPONIN I SERPL-MCNC: 5.16 NG/ML
UNIT DISPENSE STATUS: NORMAL
UNIT DISPENSE STATUS: NORMAL
UNIT PRODUCT CODE: NORMAL
UNIT PRODUCT CODE: NORMAL
UNIT RH: NORMAL
UNIT RH: NORMAL
UROBILINOGEN UR QL STRIP.AUTO: 0.2 E.U./DL
VENT AC: 14
VENT AC: 16
VENT TC: 100 %
VENT- AC: AC
VENT- AC: AC
VT SETTING VENT: 375 ML
VT SETTING VENT: 400 ML
WBC # BLD AUTO: 10.51 THOUSAND/UL (ref 4.31–10.16)
WBC # BLD AUTO: 11.61 THOUSAND/UL (ref 4.31–10.16)
WBC #/AREA URNS AUTO: ABNORMAL /HPF

## 2019-09-24 PROCEDURE — 82805 BLOOD GASES W/O2 SATURATION: CPT | Performed by: NURSE PRACTITIONER

## 2019-09-24 PROCEDURE — 86900 BLOOD TYPING SEROLOGIC ABO: CPT | Performed by: NURSE PRACTITIONER

## 2019-09-24 PROCEDURE — 85730 THROMBOPLASTIN TIME PARTIAL: CPT | Performed by: NURSE PRACTITIONER

## 2019-09-24 PROCEDURE — 5A1935Z RESPIRATORY VENTILATION, LESS THAN 24 CONSECUTIVE HOURS: ICD-10-PCS | Performed by: INTERNAL MEDICINE

## 2019-09-24 PROCEDURE — P9016 RBC LEUKOCYTES REDUCED: HCPCS

## 2019-09-24 PROCEDURE — 80307 DRUG TEST PRSMV CHEM ANLYZR: CPT | Performed by: NURSE PRACTITIONER

## 2019-09-24 PROCEDURE — 86901 BLOOD TYPING SEROLOGIC RH(D): CPT | Performed by: NURSE PRACTITIONER

## 2019-09-24 PROCEDURE — C9113 INJ PANTOPRAZOLE SODIUM, VIA: HCPCS | Performed by: EMERGENCY MEDICINE

## 2019-09-24 PROCEDURE — 86850 RBC ANTIBODY SCREEN: CPT | Performed by: NURSE PRACTITIONER

## 2019-09-24 PROCEDURE — 83735 ASSAY OF MAGNESIUM: CPT | Performed by: INTERNAL MEDICINE

## 2019-09-24 PROCEDURE — C9113 INJ PANTOPRAZOLE SODIUM, VIA: HCPCS | Performed by: NURSE PRACTITIONER

## 2019-09-24 PROCEDURE — 85025 COMPLETE CBC W/AUTO DIFF WBC: CPT | Performed by: NURSE PRACTITIONER

## 2019-09-24 PROCEDURE — 83735 ASSAY OF MAGNESIUM: CPT | Performed by: NURSE PRACTITIONER

## 2019-09-24 PROCEDURE — 36600 WITHDRAWAL OF ARTERIAL BLOOD: CPT

## 2019-09-24 PROCEDURE — 99255 IP/OBS CONSLTJ NEW/EST HI 80: CPT | Performed by: INTERNAL MEDICINE

## 2019-09-24 PROCEDURE — 84100 ASSAY OF PHOSPHORUS: CPT | Performed by: INTERNAL MEDICINE

## 2019-09-24 PROCEDURE — 82948 REAGENT STRIP/BLOOD GLUCOSE: CPT

## 2019-09-24 PROCEDURE — NC001 PR NO CHARGE: Performed by: NURSE PRACTITIONER

## 2019-09-24 PROCEDURE — 83605 ASSAY OF LACTIC ACID: CPT | Performed by: NURSE PRACTITIONER

## 2019-09-24 PROCEDURE — 85610 PROTHROMBIN TIME: CPT | Performed by: NURSE PRACTITIONER

## 2019-09-24 PROCEDURE — 99291 CRITICAL CARE FIRST HOUR: CPT | Performed by: NURSE PRACTITIONER

## 2019-09-24 PROCEDURE — 80197 ASSAY OF TACROLIMUS: CPT | Performed by: NURSE PRACTITIONER

## 2019-09-24 PROCEDURE — 30233N1 TRANSFUSION OF NONAUTOLOGOUS RED BLOOD CELLS INTO PERIPHERAL VEIN, PERCUTANEOUS APPROACH: ICD-10-PCS | Performed by: INTERNAL MEDICINE

## 2019-09-24 PROCEDURE — 96375 TX/PRO/DX INJ NEW DRUG ADDON: CPT

## 2019-09-24 PROCEDURE — 82805 BLOOD GASES W/O2 SATURATION: CPT | Performed by: EMERGENCY MEDICINE

## 2019-09-24 PROCEDURE — 84100 ASSAY OF PHOSPHORUS: CPT | Performed by: NURSE PRACTITIONER

## 2019-09-24 PROCEDURE — 99292 CRITICAL CARE ADDL 30 MIN: CPT | Performed by: INTERNAL MEDICINE

## 2019-09-24 PROCEDURE — 85027 COMPLETE CBC AUTOMATED: CPT | Performed by: NURSE PRACTITIONER

## 2019-09-24 PROCEDURE — 96374 THER/PROPH/DIAG INJ IV PUSH: CPT

## 2019-09-24 PROCEDURE — 84484 ASSAY OF TROPONIN QUANT: CPT | Performed by: NURSE PRACTITIONER

## 2019-09-24 PROCEDURE — 82330 ASSAY OF CALCIUM: CPT | Performed by: NURSE PRACTITIONER

## 2019-09-24 PROCEDURE — 80053 COMPREHEN METABOLIC PANEL: CPT | Performed by: NURSE PRACTITIONER

## 2019-09-24 PROCEDURE — 80048 BASIC METABOLIC PNL TOTAL CA: CPT | Performed by: INTERNAL MEDICINE

## 2019-09-24 PROCEDURE — 94002 VENT MGMT INPAT INIT DAY: CPT

## 2019-09-24 RX ORDER — CHLORHEXIDINE GLUCONATE 0.12 MG/ML
15 RINSE ORAL EVERY 12 HOURS SCHEDULED
Status: DISCONTINUED | OUTPATIENT
Start: 2019-09-24 | End: 2019-09-24 | Stop reason: HOSPADM

## 2019-09-24 RX ORDER — SODIUM CHLORIDE 9 MG/ML
2000 INJECTION, SOLUTION INTRAVENOUS CONTINUOUS
Status: DISCONTINUED | OUTPATIENT
Start: 2019-09-24 | End: 2019-09-24

## 2019-09-24 RX ORDER — TACROLIMUS 1 MG/1
1 CAPSULE ORAL DAILY
Status: DISCONTINUED | OUTPATIENT
Start: 2019-09-24 | End: 2019-09-24

## 2019-09-24 RX ORDER — MAGNESIUM SULFATE HEPTAHYDRATE 40 MG/ML
2 INJECTION, SOLUTION INTRAVENOUS ONCE
Status: COMPLETED | OUTPATIENT
Start: 2019-09-24 | End: 2019-09-24

## 2019-09-24 RX ORDER — PREDNISONE 2.5 MG
5 TABLET ORAL DAILY
Status: DISCONTINUED | OUTPATIENT
Start: 2019-09-25 | End: 2019-09-24 | Stop reason: HOSPADM

## 2019-09-24 RX ORDER — DEXTROSE MONOHYDRATE 50 MG/ML
125 INJECTION, SOLUTION INTRAVENOUS CONTINUOUS
Status: DISCONTINUED | OUTPATIENT
Start: 2019-09-24 | End: 2019-09-24 | Stop reason: HOSPADM

## 2019-09-24 RX ORDER — DEXTROSE MONOHYDRATE 25 G/50ML
25 INJECTION, SOLUTION INTRAVENOUS ONCE
Status: COMPLETED | OUTPATIENT
Start: 2019-09-24 | End: 2019-09-24

## 2019-09-24 RX ORDER — TACROLIMUS 1 MG/1
1 CAPSULE ORAL EVERY 12 HOURS SCHEDULED
Status: DISCONTINUED | OUTPATIENT
Start: 2019-09-24 | End: 2019-09-24

## 2019-09-24 RX ORDER — FENTANYL CITRATE 50 UG/ML
50 INJECTION, SOLUTION INTRAMUSCULAR; INTRAVENOUS ONCE
Status: COMPLETED | OUTPATIENT
Start: 2019-09-24 | End: 2019-09-24

## 2019-09-24 RX ORDER — DEXTROSE MONOHYDRATE 50 MG/ML
125 INJECTION, SOLUTION INTRAVENOUS CONTINUOUS
Qty: 50 ML | Refills: 0 | Status: SHIPPED | OUTPATIENT
Start: 2019-09-24 | End: 2019-10-18 | Stop reason: ALTCHOICE

## 2019-09-24 RX ORDER — CHLORHEXIDINE GLUCONATE 0.12 MG/ML
15 RINSE ORAL EVERY 12 HOURS SCHEDULED
Qty: 120 ML | Refills: 0 | Status: ON HOLD | OUTPATIENT
Start: 2019-09-24 | End: 2019-10-22 | Stop reason: ALTCHOICE

## 2019-09-24 RX ORDER — DEXTROSE MONOHYDRATE 25 G/50ML
INJECTION, SOLUTION INTRAVENOUS
Status: DISCONTINUED
Start: 2019-09-24 | End: 2019-09-24 | Stop reason: HOSPADM

## 2019-09-24 RX ORDER — POTASSIUM CHLORIDE 14.9 MG/ML
20 INJECTION INTRAVENOUS ONCE
Status: COMPLETED | OUTPATIENT
Start: 2019-09-24 | End: 2019-09-24

## 2019-09-24 RX ORDER — SODIUM CHLORIDE 9 MG/ML
500 INJECTION, SOLUTION INTRAVENOUS CONTINUOUS
Status: DISCONTINUED | OUTPATIENT
Start: 2019-09-24 | End: 2019-09-24

## 2019-09-24 RX ORDER — DEXTROSE AND SODIUM CHLORIDE 5; .9 G/100ML; G/100ML
125 INJECTION, SOLUTION INTRAVENOUS CONTINUOUS
Status: DISCONTINUED | OUTPATIENT
Start: 2019-09-24 | End: 2019-09-24

## 2019-09-24 RX ORDER — SODIUM CHLORIDE 9 MG/ML
250 INJECTION, SOLUTION INTRAVENOUS CONTINUOUS
Status: DISCONTINUED | OUTPATIENT
Start: 2019-09-24 | End: 2019-09-24

## 2019-09-24 RX ORDER — TACROLIMUS 1 MG/1
2 CAPSULE ORAL EVERY EVENING
Status: DISCONTINUED | OUTPATIENT
Start: 2019-09-24 | End: 2019-09-24 | Stop reason: HOSPADM

## 2019-09-24 RX ORDER — PANTOPRAZOLE SODIUM 40 MG/1
INJECTION, POWDER, FOR SOLUTION INTRAVENOUS
Status: DISCONTINUED
Start: 2019-09-24 | End: 2019-09-24 | Stop reason: HOSPADM

## 2019-09-24 RX ORDER — TACROLIMUS 1 MG/1
3 CAPSULE ORAL DAILY
Status: DISCONTINUED | OUTPATIENT
Start: 2019-09-24 | End: 2019-09-24 | Stop reason: HOSPADM

## 2019-09-24 RX ORDER — PROPOFOL 10 MG/ML
5-50 INJECTION, EMULSION INTRAVENOUS
Status: DISCONTINUED | OUTPATIENT
Start: 2019-09-24 | End: 2019-09-24 | Stop reason: HOSPADM

## 2019-09-24 RX ORDER — SODIUM CHLORIDE, SODIUM LACTATE, POTASSIUM CHLORIDE, CALCIUM CHLORIDE 600; 310; 30; 20 MG/100ML; MG/100ML; MG/100ML; MG/100ML
100 INJECTION, SOLUTION INTRAVENOUS CONTINUOUS
Status: DISCONTINUED | OUTPATIENT
Start: 2019-09-24 | End: 2019-09-24

## 2019-09-24 RX ORDER — SODIUM CHLORIDE 9 MG/ML
75 INJECTION, SOLUTION INTRAVENOUS CONTINUOUS
Status: DISCONTINUED | OUTPATIENT
Start: 2019-09-24 | End: 2019-09-24

## 2019-09-24 RX ORDER — AZATHIOPRINE 50 MG/1
50 TABLET ORAL DAILY
Status: DISCONTINUED | OUTPATIENT
Start: 2019-09-24 | End: 2019-09-24 | Stop reason: HOSPADM

## 2019-09-24 RX ADMIN — SODIUM CHLORIDE 9.4 UNITS/HR: 9 INJECTION, SOLUTION INTRAVENOUS at 09:10

## 2019-09-24 RX ADMIN — PROPOFOL 30 MCG/KG/MIN: 10 INJECTION, EMULSION INTRAVENOUS at 10:09

## 2019-09-24 RX ADMIN — DEXTROSE MONOHYDRATE 25 ML: 25 INJECTION, SOLUTION INTRAVENOUS at 00:31

## 2019-09-24 RX ADMIN — PROPOFOL 40 MCG/KG/MIN: 10 INJECTION, EMULSION INTRAVENOUS at 03:04

## 2019-09-24 RX ADMIN — ALBUTEROL SULFATE 5 MG: 2.5 SOLUTION RESPIRATORY (INHALATION) at 00:15

## 2019-09-24 RX ADMIN — SODIUM CHLORIDE 8 MG/HR: 9 INJECTION, SOLUTION INTRAVENOUS at 09:11

## 2019-09-24 RX ADMIN — SODIUM BICARBONATE 100 ML/HR: 84 INJECTION, SOLUTION INTRAVENOUS at 03:23

## 2019-09-24 RX ADMIN — AZATHIOPRINE 50 MG: 50 TABLET ORAL at 10:17

## 2019-09-24 RX ADMIN — TACROLIMUS 3 MG: 1 CAPSULE ORAL at 08:36

## 2019-09-24 RX ADMIN — Medication 80 MG: at 00:41

## 2019-09-24 RX ADMIN — CHLORHEXIDINE GLUCONATE 0.12% ORAL RINSE 15 ML: 1.2 LIQUID ORAL at 08:36

## 2019-09-24 RX ADMIN — SODIUM BICARBONATE 100 ML/HR: 84 INJECTION, SOLUTION INTRAVENOUS at 09:08

## 2019-09-24 RX ADMIN — CALCIUM CHLORIDE 1 G: 100 INJECTION, SOLUTION INTRAVENOUS at 00:25

## 2019-09-24 RX ADMIN — SODIUM CHLORIDE 8 MG/HR: 9 INJECTION, SOLUTION INTRAVENOUS at 03:30

## 2019-09-24 RX ADMIN — HYDROCORTISONE SODIUM SUCCINATE 100 MG: 100 INJECTION, POWDER, FOR SOLUTION INTRAMUSCULAR; INTRAVENOUS at 05:57

## 2019-09-24 RX ADMIN — SODIUM BICARBONATE 50 MEQ: 84 INJECTION INTRAVENOUS at 01:05

## 2019-09-24 RX ADMIN — DEXTROSE AND SODIUM CHLORIDE 125 ML/HR: 5; .9 INJECTION, SOLUTION INTRAVENOUS at 09:34

## 2019-09-24 RX ADMIN — SODIUM CHLORIDE 4.7 UNITS/HR: 9 INJECTION, SOLUTION INTRAVENOUS at 03:28

## 2019-09-24 RX ADMIN — POTASSIUM CHLORIDE 20 MEQ: 200 INJECTION, SOLUTION INTRAVENOUS at 09:02

## 2019-09-24 RX ADMIN — DEXTROSE 125 ML/HR: 5 SOLUTION INTRAVENOUS at 10:09

## 2019-09-24 RX ADMIN — SODIUM CHLORIDE, SODIUM LACTATE, POTASSIUM CHLORIDE, AND CALCIUM CHLORIDE 1000 ML: .6; .31; .03; .02 INJECTION, SOLUTION INTRAVENOUS at 08:54

## 2019-09-24 RX ADMIN — MAGNESIUM SULFATE HEPTAHYDRATE 2 G: 40 INJECTION, SOLUTION INTRAVENOUS at 09:07

## 2019-09-24 RX ADMIN — INSULIN HUMAN 5 UNITS: 100 INJECTION, SOLUTION PARENTERAL at 00:38

## 2019-09-24 RX ADMIN — FENTANYL CITRATE 50 MCG: 0.05 INJECTION, SOLUTION INTRAMUSCULAR; INTRAVENOUS at 00:30

## 2019-09-24 RX ADMIN — FENTANYL CITRATE 50 MCG: 0.05 INJECTION, SOLUTION INTRAMUSCULAR; INTRAVENOUS at 00:09

## 2019-09-24 NOTE — CONSULTS
Consultation    Nephrology   Sharon Mccord 51 y o  male MRN: [id number]  Unit/Bed#: ICU 12 Encounter: [id number]    History of Present Illness   Physician Requesting Consult: Salome Mcpherson DO  Reason for Consult : -  acute kidney injury     ASSESSMENT/PLAN:   51 y o   male with pmh of hypertension, diabetes, CAD status post stents and CKD stage 5 T status post 2 renal transplants admitted with weakness and coffee ground emesis subsequently became unresponsive and needed intubation  Nephrology consulted for evaluation and management of acute kidney injury  1)Acute kidney injury (POA) on CKD stage 5 T:  - MIKE most likely secondary to progression to ESRD plus hemorrhagic shock + pre renal azotemia  - After review of records it appears that the patient has a baseline Creatinine of 4 5mg/dL  - patient was admitted with a creatinine of 6 90 mg/dL  - patient's creatinine today is at 6 67 mg/dL  - clinically patient appears to be euvolemic to minimally hypervolemic  - Avoid nephrotoxins, adjust meds to appropriate GFR   - Acid base and lytes stable except anion gap metabolic acidosis, hyperphosphatemia  - had a detailed discussion with the patient that in light of his renal parameters and overall condition we should pursue renal replacement therapy with dialysis  Explained all risks and benefits to the patient in depth including death however patient refuses to undergo dialysis understands the risks  Wishes for only medical management at this time  I have discussed this with the team   Patient is awaiting transfer to [facility]  - Optimize hemodynamic status to avoid delay in renal recovery  - check BMP, magnesium, phosphorus Q 4   - Check renal ultrasound to r/o hydronephrosis, obstruction   - Await renal recovery  - Place on a renal diet when allowed diet order    - Strict I/O   - history of renal transplants x2 last 1 was in 2001   - on Prograf 3 mg p o  Q a m  And 2 mg p o  Q p m     - check Prograf level, likely would need to hold dose as level may be high due to worsening renal parameters  - on prednisone 5 mg p o  Q day     2)Blood pressure:  - Optimize hemodynamics   - Maintain MAP > 65mmHg  - Avoid BP fluctuations  3)H/H/anemia:  - most recent hemoglobin at 7 5 grams/deciliter, admitted with a hemoglobin of 2 7 grams/deciliter  - maintain hemoglobin greater than 8 grams/deciliter  - management as per primary team  - recommend use of leukocyte reduced, irradiated blood    4)Volume status:  - Clinically patient appears to be euvolemic to minimally hypervolemic  - continue on IV fluids of lactated Ringer's as well as D5 with 150 mEq sodium bicarb at 100 cc an hour    5)Hyponatremia:  -initially secondary to hyperglycemia  - Continue to monitor for now  - sodium stable and improved    6)Hyperkalemia / hypokalemia:  - Likely due to decreased distal delivery  - Low potassium diet  - now improved with replacement as well as insulin    7)Hyperphosphatemia:  - Likely due to decreased renal clearance  - Continue to monitor for now  - likely would benefit from dialysis and renal phosphorus binders  - records show that outpatient he was on PhosLo and calcitriol  Will get further history once he is extubated    8)DM / DKA:  - management as per Primary team  - on IV fluids and insulin    9) anion gap metabolic acidosis:  - due to renal failure along with DKA  - has chronic acidosis likely secondary to his renal dysfunction, was on Bicitra as an outpatient    10) NSTEMI:  - most recent troponins at 14  - follow-up with primary team        Thanks for the consult  Will continue to follow  Please call with questions/ concerns  Above-mentioned orders and Plan with regards to acute kidney injury and electrolyte abnormalities was discussed with the team in depth      Trupti Heredia MD, FASN, 9/24/2019, 10:24 AM          HISTORY OF PRESENT ILLNESS:   Lena Wyatt is a 51 y o   male with pmh of hypertension, diabetes, CAD status post stents and CKD stage 5 T status post 2 renal transplants admitted with weakness and coffee ground emesis subsequently became unresponsive and needed intubation  Nephrology consulted for evaluation and management of acute kidney injury  Patient seen and examined in the ICU earlier this twyla Franciscomarii Garcia Review of records shows that patient has a history of 2 renal transplants the last 1 of which was in 2001 which was a living donor transplant  Patient has had a baseline creatinine around 4 5 mg per dL around July 2019  Records show that patient has not been accepting to the idea of dialysis and thus far is not listed or completed workup for a 3rd renal transplant  Even the patient was intubated he was able to communicate since he was not on any sedation  He reports he was never on dialysis previously  He is not interested in dialysis even if the need arises and understands the risks and benefits  At the moment patient is awaiting transfer to [facility] as soon as bed is available  Upon arrival to the ED he became unresponsive needed to be intubated his hemoglobin came back as 2 7 grams/deciliter  His creatinine was as high as 6 mg/dL with severe azotemia as well as metabolic acidosis  He was started treatment with IV fluids as well as insulin for DKA  Remains intubated plan is for extubation later this twyla astorga  Has received PRBC  Awaiting GI consult for possible EGD  At this time patient has made thus far about 70 cc of urine since admission  He was admitted with a pH of 7 08 and a bicarb level of 7 1, most recent bicarb levels corrected to 10  History obtained from chart review and the patient    Consults    Review of Systems   Constitutional: Negative for appetite change, fatigue and fever  HENT: Negative for congestion and sore throat  Respiratory: Negative for wheezing  Cardiovascular: Negative for chest pain, palpitations and leg swelling     Gastrointestinal: Negative for abdominal pain, diarrhea, nausea and vomiting  Genitourinary: Negative for flank pain  Musculoskeletal: Negative for back pain  Neurological: Negative for dizziness and headaches  Psychiatric/Behavioral: Negative for confusion          Historical Information   Patient Active Problem List   Diagnosis    Osteomyelitis ([place] [address] )    Foot pain    Type 1 diabetes mellitus ([place] [address] )    Renal transplant, status post    Sepsis ([place] [address] )    [facility] ([facility] )    Osteomyelitis ([place] [address] )    Poor circulation    Closed fracture of distal end of right tibia with routine healing    Chronic kidney disease, stage IV (severe) ([place] [address] )    Chronic osteomyelitis of tibia ([place] [address] )    Immunosuppression ([place] [address] )    Hypertension    DKA (diabetic ketoacidoses) ([place] [address] )    Diarrhea    Cellulitis of ankle    Non-ST elevation myocardial infarction (NSTEMI), type 2    Urinary retention    [facility]    Stage 4 chronic kidney disease ([facility])    Metabolic acidosis    Hyperphosphatemia    Gastrointestinal hemorrhage    Bacteremia    S/P AKA (above knee amputation), right ([facility])    Acute blood loss anemia    Acute respiratory failure ([facility])     Past Medical History:   Diagnosis Date    Bacteremia 12/21/2018    Cardiac arrest ([place] [address] )     Diabetes mellitus ([place] [address] )     Diabetes mellitus type 1 ([place] [address] )     GI bleed     Hypertension     Infection at site of external fixator pin ([place] [address] )     MI (myocardial infarction) ([place] [address] )     Pneumonia     Last Assessed [id number]    Renal failure     Renal transplant, status post 07/21/2007     Past Surgical History:   Procedure Laterality Date    ANKLE FRACTURE SURGERY      ANKLE HARDWARE REMOVAL Right 7/31/2017    Procedure: REMOVAL HARDWARE ANKLE;  Surgeon: Maria Elena Valencia MD;  Location: [facility];  Service: Orthopedics    ANKLE HARDWARE REMOVAL Right 8/17/2017    Procedure: TIBIA FAILED HARDWARE REMOVAL;  Surgeon: Maria Elena Valencia MD;  [facility]: [facility];  Service: Orthopedics    CARDIAC SURGERY      2 stents  CLOSED REDUCTION ANKLE Right 7/3/2017    Procedure: CLOSED REDUCTION DISTAL TIB-FIB AND CASTING VS;  Surgeon: Jozef Guerrero MD;  Location: [facility];  Service: Orthopedics    ESOPHAGOGASTRODUODENOSCOPY N/A 12/20/2018    Procedure: ESOPHAGOGASTRODUODENOSCOPY (EGD) in ICU; Surgeon: Jillian Michelle MD;  Location: [facility]; Service: Gastroenterology    EYE SURGERY      FRACTURE SURGERY      ORIF Rt Ankle    GLUTAMIC ACID DECARBOXYLASE (HISTORICAL)      IR PICC LINE  8/20/2018    IR VENOUS LINE REMOVAL  10/5/2018    LEG AMPUTATION Right 02/01/2019    Above the knee    NEPHRECTOMY TRANSPLANTED ORGAN      AL OPEN TREATMENT FRACTURE DISTAL TIBIA FIBULA Right 7/3/2017    Procedure: OPEN REDUCTION W/ INTERNAL FIXATION (ORIF);   Surgeon: Jozfe Guerrero MD;  Location: [facility];  Service: Orthopedics    TOE AMPUTATION Right 10/27/2016    Procedure: AMPUTATION TOE;  Surgeon: Allen Orr DPM;  Location: [facility];  Service:      Social History   Social History     Substance and Sexual Activity   Alcohol Use Not Currently     Social History     Substance and Sexual Activity   Drug Use Never     Social History     Tobacco Use   Smoking Status Never Smoker   Smokeless Tobacco Never Used     Family History   Problem Relation Age of Onset    Diabetes Brother     Coronary artery disease Mother        Meds/Allergies   current meds:   Current Facility-Administered Medications   Medication Dose Route Frequency    azaTHIOprine (IMURAN) tablet 50 mg  50 mg Oral Daily    chlorhexidine (PERIDEX) 0 12 % oral rinse 15 mL  15 mL Swish & Spit Q12H [address]    dextrose 5 % infusion  125 mL/hr Intravenous Continuous    insulin regular (HumuLIN R,NovoLIN R) 1 Units/mL in sodium chloride 0 9 % 100 mL infusion  0 1-30 Units/hr Intravenous Continuous    lactated ringers bolus 1,000 mL  1,000 mL Intravenous Once    magnesium sulfate 2 g/50 mL IVPB (premix) 2 g  2 g Intravenous Once    pantoprazole (PROTONIX) 80 mg in sodium chloride 0 9 % 100 mL infusion  8 mg/hr Intravenous Continuous    potassium chloride 20 mEq IVPB (premix)  20 mEq Intravenous Once    [START ON 9/25/2019] predniSONE tablet 5 mg  5 mg Oral Daily    propofol (DIPRIVAN) 1000 mg in 100 mL infusion (premix)  5-50 mcg/kg/min Intravenous Titrated    sodium bicarbonate 150 mEq in sterile water 1,000 mL infusion  100 mL/hr Intravenous Continuous    tacrolimus (PROGRAF) capsule 2 mg  2 mg Oral QPM    tacrolimus (PROGRAF) capsule 3 mg  3 mg Oral Daily    and PTA meds:   Prior to Admission Medications   Prescriptions Last Dose Informant Patient Reported? Taking?    Multiple Vitamins-Minerals (MULTIVITAMIN ADULT) TABS   Yes No   Sig: Take 1 tablet by mouth   NIFEDIPINE PO   Yes No   Sig: Take 30 mg by mouth daily    ONE TOUCH ULTRA TEST test strip   No No   Sig: CHECK BLOOD SUGAR 5 TO 6 TIMES DAILY   Sod Citrate-Citric Acid (CYTRA-2) 500-334 MG/5ML SOLN   Yes No   Sig: Take 30 mL by mouth   albuterol (2 5 mg/3 mL) 0 083 % nebulizer solution   Yes No   Sig: Inhale 2 5 mg   atorvastatin (LIPITOR) 80 mg tablet   Yes No   Sig: Take 80 mg by mouth daily    azaTHIOprine (IMURAN) 50 mg tablet   Yes No   Sig: Take 50 mg by mouth   calcitriol (ROCALTROL) 0 5 MCG capsule  Self Yes No   Sig: Take 1 mcg by mouth daily Monday Wednesday, Friday   calcium acetate (PHOSLO) capsule   Yes No   Sig: Take 667 mg by mouth   clopidogrel (PLAVIX) 75 mg tablet   Yes No   Sig: Take 75 mg by mouth   insulin detemir (LEVEMIR) 100 units/mL subcutaneous injection   No No   Sig: Inject 10 Units under the skin 2 (two) times a day   insulin lispro (HUMALOG) 100 units/mL injection   No No   Sig: Inject 30 Units under the skin daily   lansoprazole (PREVACID SOLUTAB) 30 mg disintegrating tablet Not Taking at discontinued   Yes No   Sig: Take 30 mg by mouth   metoprolol succinate (TOPROL-XL) 25 mg 24 hr tablet   No No   Sig: Take 2 tablets (50 mg total) by mouth daily   predniSONE 5 mg tablet   Yes No   Sig: Take 5 mg by mouth daily   tacrolimus (PROGRAF) 1 mg capsule   No No   Sig: 3 caps in the AM, 2 caps PM/HS   torsemide (DEMADEX) 20 mg tablet   No Yes   Sig: Take 3 tablets (60 mg total) by mouth daily      Facility-Administered Medications: None       Scheduled Meds:  Current Facility-Administered Medications:  azaTHIOprine 50 mg Oral Daily Halie Tahira, CRNP    chlorhexidine 15 mL Swish & Spit Q12H [address] Halie Tahira, CRNP    dextrose 125 mL/hr Intravenous Continuous GAVINO Samano Last Rate: 125 mL/hr (09/24/19 1009)   insulin regular (HumuLIN R,NovoLIN R) infusion 0 1-30 Units/hr Intravenous Continuous Halie Tahira, CRNP Last Rate: 4 7 Units/hr (09/24/19 0923)   lactated ringers 1,000 mL Intravenous Once GAVINO Falcon Last Rate: 1,000 mL (09/24/19 0854)   magnesium sulfate 2 g Intravenous Once Belén K GAVINO Jimenez    pantoprozole (PROTONIX) infusion (Continuous) 8 mg/hr Intravenous Continuous Halie Tahira, CRNP Last Rate: 8 mg/hr (09/24/19 0911)   potassium chloride 20 mEq Intravenous Once GAVINO Falcon Last Rate: 20 mEq (09/24/19 0902)   [START ON 9/25/2019] predniSONE 5 mg Oral Daily Belén K GAVINO Jimenez    propofol 5-50 mcg/kg/min Intravenous Titrated Halie Tahira, CRNP Last Rate: 30 mcg/kg/min (09/24/19 1009)   sodium bicarbonate infusion 100 mL/hr Intravenous Continuous Chichi Harada Santiagoofsamson, CRNP Last Rate: 100 mL/hr (09/24/19 0908)   tacrolimus 2 mg Oral QPM Halie Tahira, CRNP    tacrolimus 3 mg Oral Daily Halie Tahira, CRNP        PRN Meds:     Continuous Infusions:  dextrose 125 mL/hr Last Rate: 125 mL/hr (09/24/19 1009)   insulin regular (HumuLIN R,NovoLIN R) infusion 0 1-30 Units/hr Last Rate: 4 7 Units/hr (09/24/19 0923)   pantoprozole (PROTONIX) infusion (Continuous) 8 mg/hr Last Rate: 8 mg/hr (09/24/19 0911)   propofol 5-50 mcg/kg/min Last Rate: 30 mcg/kg/min (09/24/19 1009)   sodium bicarbonate infusion 100 mL/hr Last Rate: 100 mL/hr (09/24/19 0908)       No Known Allergies      Invasive Devices: Invasive Devices     Central Venous Catheter Line            CVC Central Lines 19 Triple less than 1 day          Peripheral Intravenous Line            Peripheral IV 19 Left Antecubital less than 1 day          Drain            NG/OG/Enteral Tube Orogastric Center mouth less than 1 day    Urethral Catheter Latex; Temperature probe 16 Fr  less than 1 day          Airway            ETT  Cuffed 7 mm less than 1 day                  PHYSICAL EXAM  BP (!) 184/88   Pulse 94   Temp 99 °F (37 2 °C)   Resp (!) 25   Ht 5' 4" (1 626 m)   Wt 46 8 kg (103 lb 2 8 oz)   SpO2 100%   BMI 17 71 kg/m²   Temp (24hrs), Av 2 °F (36 2 °C), Min:93 4 °F (34 1 °C), Max:99 3 °F (37 4 °C)        Intake/Output Summary (Last 24 hours) at 2019 1024  Last data filed at 2019 0932  Gross per 24 hour   Intake 736 43 ml   Output 380 ml   Net 356 43 ml       I/O last 24 hours: In: 736 4 [I V :336 4; Blood:350; NG/GT:50]  Out: 380 [Urine:380]          Current Weight: Weight - Scale: 46 8 kg (103 lb 2 8 oz)  First Weight: Weight - Scale: 46 8 kg (103 lb 2 8 oz)  Physical Exam   Constitutional: He is oriented to person, place, and time  He appears well-developed and well-nourished  No distress  HENT:   Head: Normocephalic and atraumatic  Intubated   Eyes: Conjunctivae are normal  No scleral icterus  Neck: Neck supple  No JVD present  Cardiovascular: Normal heart sounds  Exam reveals no friction rub  Pulmonary/Chest: Effort normal  He has no wheezes  He has no rales  Abdominal: Soft  There is no tenderness  Scars from renal transplant lower abdomen   Musculoskeletal: He exhibits edema  Trace bilateral upper and lower extremity edema, right AKA  Neurological: He is alert and oriented to person, place, and time  Skin: Skin is warm  He is not diaphoretic  No pallor  Psychiatric: He has a normal mood and affect   His behavior is normal    Nursing note and vitals reviewed  LABORATORY:    Results from last 7 days   Lab Units 09/24/19  0554 09/24/19  0235 09/23/19  2303   WBC Thousand/uL 10 51* 11 61* 12 77*   HEMOGLOBIN g/dL 7 5* 6 9* 2 7*   HEMATOCRIT % 23 6* 22 0* 9 3*   PLATELETS Thousands/uL 171 180 338   POTASSIUM mmol/L 3 9 4 6 6 1*   CHLORIDE mmol/L 107 107 106   CO2 mmol/L 10* 10* 9*   BUN mg/dL 153* 147* 154*   CREATININE mg/dL 6 67* 6 60* 6 90*   CALCIUM mg/dL 7 3* 7 7* 6 7*   MAGNESIUM mg/dL 1 9  --   --    PHOSPHORUS mg/dL 7 8*  --   --       rest all reviewed    RADIOLOGY:  No orders to display     Rest all reviewed    Portions of the record may have been created with voice recognition software  Occasional wrong word or "sound a like" substitutions may have occurred due to the inherent limitations of voice recognition software  Read the chart carefully and recognize, using context, where substitutions have occurred  If you have any questions, please contact the dictating provider

## 2019-09-24 NOTE — UTILIZATION REVIEW
Notification of Inpatient Admission/Inpatient Authorization Request  This is a Notification of Inpatient Admission/Request for Inpatient Authorization for our facility [address]  Be advised that this patient was admitted to our facility under Inpatient Status  Please contact the Utilization Review Department where the patient is receiving care services for additional admission information  Place of Service Code: 24   Place of Service Name: Inpatient Hospital  Presentation Date & Time: 9/24/2019  2:16 AM  Inpatient Admission Date & Time: 9/24/19 0216  Discharge Date & Time: 9/24/2019 12:00 PM   Discharge Disposition (if discharged): [address] [place] [address]  Attending Physician and NPI#: Lavinia Sam [[id number]]  Admission Orders (From admission, onward)     Ordered        09/24/19 0238  Inpatient Admission  Once                   Facility: [facility] Utilization Review Department  Phone: [phone]; Fax [phone]  [email or web address]  ATTENTION: Please call with any questions or concerns to [phone]  and carefully listen to the prompts so that you are directed to the right person  Send all requests for admission clinical reviews, approved or denied determinations and any other requests to fax [phone]   All voicemails are confidential

## 2019-09-24 NOTE — ED PROVIDER NOTES
History  Chief Complaint   Patient presents with    Weakness - Generalized     pt states his diabetes was givinghim problems beginning today  pts wife states he has been very disphoretic and SOB     HPI   This is a 78-year-old male with past medical history of type 1 diabetes mellitus, hypertension, end-stage renal disease with history of renal transplant, not on hemodialysis, GI bleed, myocardial infarction, right above knee amputation, presenting with generalized weakness, diaphoresis, shortness of breath  Patient has been sick with an upper respiratory illness for the past 1 week  This morning, he called his wife to let her know that his blood glucose was low  When she arrived home, patient was very fatigued and weak, he appeared very diaphoretic and was short of breath  Patient himself denies chest pain, reports some shortness of breath, denies cough  He denies abdominal pain  He denies headache  He has not had sepsis since he had issues with his right leg wound which ultimately led up to his above knee amputation  He has been taking all medications as prescribed  No nausea or vomiting, no hematemesis, no dark tarry stools  Prior to Admission Medications   Prescriptions Last Dose Informant Patient Reported? Taking?    Multiple Vitamins-Minerals (MULTIVITAMIN ADULT) TABS   Yes No   Sig: Take 1 tablet by mouth   NIFEDIPINE PO   Yes No   Sig: Take 30 mg by mouth daily    ONE TOUCH ULTRA TEST test strip   No No   Sig: CHECK BLOOD SUGAR 5 TO 6 TIMES DAILY   Sod Citrate-Citric Acid (CYTRA-2) 500-334 MG/5ML SOLN   Yes No   Sig: Take 30 mL by mouth   albuterol (2 5 mg/3 mL) 0 083 % nebulizer solution   Yes No   Sig: Inhale 2 5 mg   atorvastatin (LIPITOR) 80 mg tablet   Yes No   Sig: Take 80 mg by mouth daily    azaTHIOprine (IMURAN) 50 mg tablet   Yes No   Sig: Take 50 mg by mouth   calcitriol (ROCALTROL) 0 5 MCG capsule  Self Yes No   Sig: Take 1 mcg by mouth daily Monday Wednesday, Friday   calcium acetate (PHOSLO) capsule   Yes No   Sig: Take 667 mg by mouth   clopidogrel (PLAVIX) 75 mg tablet   Yes No   Sig: Take 75 mg by mouth   insulin detemir (LEVEMIR) 100 units/mL subcutaneous injection   No No   Sig: Inject 10 Units under the skin 2 (two) times a day   insulin lispro (HUMALOG) 100 units/mL injection   No No   Sig: Inject 30 Units under the skin daily   lansoprazole (PREVACID SOLUTAB) 30 mg disintegrating tablet   Yes No   Sig: Take 30 mg by mouth   metoprolol succinate (TOPROL-XL) 25 mg 24 hr tablet   No No   Sig: Take 2 tablets (50 mg total) by mouth daily   predniSONE 5 mg tablet   Yes No   Sig: Take 5 mg by mouth daily   tacrolimus (PROGRAF) 1 mg capsule   No No   Sig: 3 caps in the AM, 2 caps PM/HS   torsemide (DEMADEX) 20 mg tablet   No No   Sig: Take 3 tablets (60 mg total) by mouth daily      Facility-Administered Medications: None       Past Medical History:   Diagnosis Date    Bacteremia 12/21/2018    [facility]     Diabetes mellitus [facility]     Diabetes mellitus type 1 ([facility]     GI bleed     Hypertension     Infection at site of external fixator pin ([facility]     MI [facility]myocardial infarction) ([facility]     Pneumonia     Last Assessed [id number]    Renal failure     Renal transplant, status post 07/21/2007       Past Surgical History:   Procedure Laterality Date    ANKLE FRACTURE SURGERY      ANKLE HARDWARE REMOVAL Right 7/31/2017    Procedure: REMOVAL HARDWARE ANKLE;  Surgeon: Cb Mcpherson MD;  [facility]: [facility];  Service: Orthopedics    ANKLE HARDWARE REMOVAL Right 8/17/2017    Procedure: TIBIA FAILED HARDWARE REMOVAL;  Surgeon: Cb Mcpherson MD;  [facility]: [facility];  Service: Orthopedics    CARDIAC SURGERY      2 stents    CLOSED REDUCTION ANKLE Right 7/3/2017    Procedure: CLOSED REDUCTION DISTAL TIB-FIB AND CASTING VS;  Surgeon: Cb Mcpherson MD;  [facility]: [facility];  Service: Orthopedics    ESOPHAGOGASTRODUODENOSCOPY N/A 12/20/2018    Procedure: ESOPHAGOGASTRODUODENOSCOPY (EGD) in ICU; Surgeon: Michelle Atkinson MD;  Location: [facility]; Service: Gastroenterology    EYE SURGERY      FRACTURE SURGERY      ORIF Rt Ankle    GLUTAMIC ACID DECARBOXYLASE (HISTORICAL)      IR PICC LINE  8/20/2018    IR VENOUS LINE REMOVAL  10/5/2018    LEG AMPUTATION Right 02/01/2019    Above the knee    NEPHRECTOMY TRANSPLANTED ORGAN      IA OPEN TREATMENT FRACTURE DISTAL TIBIA FIBULA Right 7/3/2017    Procedure: OPEN REDUCTION W/ INTERNAL FIXATION (ORIF); Surgeon: Anil Greco MD;  [facility];  Service: Orthopedics    TOE AMPUTATION Right 10/27/2016    Procedure: AMPUTATION TOE;  Surgeon: Vicente Marinelli DPM;  Location: [facility];  Service:        Family History   Problem Relation Age of Onset    Diabetes Brother     Coronary artery disease Mother      I have reviewed and agree with the history as documented  Social History     Tobacco Use    Smoking status: Never Smoker    Smokeless tobacco: Never Used   Substance Use Topics    Alcohol use: Not Currently    Drug use: Never        Review of Systems   Unable to perform ROS: Mental status change   Constitutional: Positive for diaphoresis and fatigue  Negative for fever  HENT: Positive for congestion and rhinorrhea  Respiratory: Positive for cough and shortness of breath  Cardiovascular: Negative for chest pain  Gastrointestinal: Negative for abdominal pain, nausea and vomiting  Endocrine:        Known diabetes   Genitourinary: Negative for difficulty urinating  Skin: Negative for rash  Psychiatric/Behavioral: Negative for confusion  All other systems reviewed and are negative  Physical Exam  Physical Exam   Constitutional: He is oriented to person, place, and time  He appears distressed (respiratory distress)  Frail and ill-appearing 40-year-old male resting in bed with eyes closed, answers questions appropriately, tachypneic  HENT:   Head: Normocephalic and atraumatic  Mouth/Throat: No oropharyngeal exudate  Eyes: Conjunctivae are normal  Right eye exhibits no discharge  Left eye exhibits no discharge  No scleral icterus  Neck: Neck supple  No tracheal deviation present  Cardiovascular: Normal rate, regular rhythm, normal heart sounds and intact distal pulses  Exam reveals no gallop and no friction rub  No murmur heard  Pulmonary/Chest: He is in respiratory distress  He has rales  Increased work of breathing with accessory muscle use, diffuse rhonchi throughout anterior and posterior lung fields  Abdominal: Soft  Bowel sounds are normal  He exhibits no distension  There is no tenderness  There is no rebound and no guarding  No decubitus ulcers present   Musculoskeletal: He exhibits edema ( trace pedal edema on the left)  Above knee amputation on the right, stump is without erythema   Neurological: He is oriented to person, place, and time  Resting with eyes closed, answers questions appropriately  Moves all extremities spontaneously  Nonfocal exam    Skin: Capillary refill takes less than 2 seconds  He is diaphoretic  There is pallor     Somewhat cool to touch   Psychiatric:   Appears fatigued, is pleasant       Vital Signs  ED Triage Vitals   Temperature Pulse Respirations Blood Pressure SpO2   09/23/19 2010 09/23/19 2010 09/23/19 2010 09/23/19 2010 09/23/19 2010   (!) 96 9 °F (36 1 °C) 65 22 (!) 98/47 99 %      Temp Source Heart Rate Source Patient Position - Orthostatic VS BP Location FiO2 (%)   09/23/19 2010 09/23/19 2010 09/23/19 2010 09/23/19 2010 09/23/19 2130   Temporal Monitor Lying Left arm 30      Pain Score       09/23/19 2010       No Pain           Vitals:    09/23/19 2315 09/23/19 2330 09/24/19 0015 09/24/19 0045   BP: 107/52 127/58     Pulse: 66 64 63 75   Patient Position - Orthostatic VS:             Visual Acuity      ED Medications  Medications   propofol (DIPRIVAN) 1000 mg in 100 mL infusion (premix) (30 mcg/kg/min × 45 7 kg Intravenous Rate/Dose Change 9/24/19 0107)   fentanyl citrate (PF) 100 MCG/2ML 25 mcg (25 mcg Intravenous Not Given 9/24/19 0009)   pantoprazole (PROTONIX) 40 mg injection **ADS Override Pull** (has no administration in time range)   lactated ringers bolus 1,000 mL (0 mL Intravenous Stopped 9/23/19 2245)     Followed by   lactated ringers bolus 1,000 mL (0 mL Intravenous Stopped 9/23/19 2245)   cefepime (MAXIPIME) IVPB (premix) 1,000 mg (0 mg Intravenous Stopped 9/23/19 2139)   vancomycin (VANCOCIN) IVPB (premix) 1,000 mg (0 mg/kg × 45 7 kg Intravenous Stopped 9/23/19 2240)   etomidate (AMIDATE) 2 mg/mL injection 10 mg (10 mg Intravenous Given 9/23/19 2318)   Succinylcholine Chloride 100 mg/5 mL syringe 60 mg (50 mg Intravenous Given 9/23/19 2331)   albuterol inhalation solution 5 mg (5 mg Nebulization Given 9/24/19 0015)   calcium chloride 10 % injection 1 g (1 g Intravenous Given 9/24/19 0025)   pantoprazole (PROTONIX) 80 mg in sodium chloride 0 9 % 100 mL IVPB (0 mg Intravenous Stopped 9/24/19 0056)   fentanyl citrate (PF) 100 MCG/2ML 50 mcg (50 mcg Intravenous Given 9/24/19 0009)   dextrose 50 % IV solution 25 mL (25 mL Intravenous Given 9/24/19 0031)   fentanyl citrate (PF) 100 MCG/2ML 50 mcg (50 mcg Intravenous Given 9/24/19 0030)   insulin regular (HumuLIN R,NovoLIN R) injection 5 Units (5 Units Intravenous Given 9/24/19 0038)   sodium bicarbonate 8 4 % injection 50 mEq (50 mEq Intravenous Given 9/24/19 0105)       Diagnostic Studies  Results Reviewed     Procedure Component Value Units Date/Time    Blood gas, arterial [[id number]]  (Abnormal) Collected:  09/24/19 0026    Lab Status:  Final result Specimen:  Blood, Arterial from Radial, Right Updated:  09/24/19 0033     pH, Arterial 7 085     pCO2, Arterial 24 2 mm Hg      pO2, Arterial 486 2 mm Hg      HCO3, Arterial 7 1 mmol/L      Base Excess, Arterial -20 8 mmol/L      O2 Content, Arterial 7 6 mL/dL      O2 HGB,Arterial  99 1 %      SOURCE Radial, Right     AUBREY TEST Yes     Vent Type- AC AC     AC Rate 16     Tidal Volume 400 ml      Inspired Air (FIO2) 100     PEEP 5    Troponin I [[id number]]  (Abnormal) Collected:  09/23/19 2352    Lab Status:  Final result Specimen:  Blood from Central Venous Line Updated:  09/24/19 0032     Troponin I 0 80 ng/mL     Urine Microscopic [[id number]]  (Abnormal) Collected:  09/23/19 2352    Lab Status:  Final result Specimen:  Urine, Indwelling Chowdary Catheter Updated:  09/24/19 0010     RBC, UA 0-1 /hpf      WBC, UA None Seen /hpf      Epithelial Cells None Seen /hpf      Bacteria, UA Occasional /hpf      AMORPH URATES Occasional /hpf     UA w Reflex to Microscopic w Reflex to Culture [[id number]]  (Abnormal) Collected:  09/23/19 2352    Lab Status:  Final result Specimen:  Urine, Indwelling Chowdary Catheter Updated:  09/24/19 0004     Color, UA Yellow     Clarity, UA Clear     Specific Cameron, UA 1 020     pH, UA 5 5     Leukocytes, UA Negative     Nitrite, UA Negative     Protein,  (2+) mg/dl      Glucose, UA Negative mg/dl      Ketones, UA Negative mg/dl      Urobilinogen, UA 0 2 E U /dl      Bilirubin, UA Negative     Blood, UA Trace-Intact    Lactic acid x2 [[id number]]  (Abnormal) Collected:  09/23/19 2258    Lab Status:  Final result Specimen:  Blood from Central Venous Line Updated:  09/23/19 2335     LACTIC ACID 3 7 mmol/L     Narrative:       Result may be elevated if tourniquet was used during collection      Comprehensive metabolic panel [[id number]]  (Abnormal) Collected:  09/23/19 2303    Lab Status:  Final result Specimen:  Blood from Central Venous Line Updated:  09/23/19 2334     Sodium 133 mmol/L      Potassium 6 1 mmol/L      Chloride 106 mmol/L      CO2 9 mmol/L      ANION GAP 18 mmol/L       mg/dL      Creatinine 6 90 mg/dL      Glucose 307 mg/dL      Calcium 6 7 mg/dL      AST 15 U/L      ALT 14 U/L      Alkaline Phosphatase 61 U/L      Total Protein 4 5 g/dL      Albumin 1 9 g/dL      Total Bilirubin 0 30 mg/dL eGFR 9 ml/min/1 73sq m     Narrative:       National Kid[employer] guidelines for Chronic Kidney Disease (CKD):     Stage 1 with normal or high GFR (GFR > 90 mL/min/1 73 square meters)    Stage 2 Mild CKD (GFR = 60-89 mL/min/1 73 square meters)    Stage 3A Moderate CKD (GFR = 45-59 mL/min/1 73 square meters)    Stage 3B Moderate CKD (GFR = 30-44 mL/min/1 73 square meters)    Stage 4 Severe CKD (GFR = 15-29 mL/min/1 73 square meters)    Stage 5 End Stage CKD (GFR <15 mL/min/1 73 square meters)  Note: GFR calculation is accurate only with a steady state creatinine    CBC and differential [[id number]]  (Abnormal) Collected:  09/23/19 2303    Lab Status:  Final result Specimen:  Blood from Central Venous Line Updated:  09/23/19 2312     WBC 12 77 Thousand/uL      RBC 0 91 Million/uL      Hemoglobin 2 7 g/dL      Hematocrit 9 3 %       fL      MCH 29 7 pg      MCHC 29 0 g/dL      RDW 15 2 %      MPV 8 3 fL      Platelets 352 Thousands/uL      nRBC 0 /100 WBCs      Neutrophils Relative 87 %      Immat GRANS % 2 %      Lymphocytes Relative 9 %      Monocytes Relative 2 %      Eosinophils Relative 0 %      Basophils Relative 0 %      Neutrophils Absolute 11 12 Thousands/µL      Immature Grans Absolute 0 21 Thousand/uL      Lymphocytes Absolute 1 11 Thousands/µL      Monocytes Absolute 0 29 Thousand/µL      Eosinophils Absolute 0 03 Thousand/µL      Basophils Absolute 0 01 Thousands/µL     Tacrolimus level [[id number]] Collected:  09/23/19 2258    Lab Status:   In process Specimen:  Blood from Central Venous Line Updated:  09/23/19 2305    Blood gas, arterial [[id number]]  (Abnormal) Collected:  09/23/19 2111    Lab Status:  Final result Specimen:  Blood, Arterial from Brachial, Right Updated:  09/23/19 2116     pH, Arterial 7 109     pCO2, Arterial 23 2 mm Hg      pO2, Arterial 25 6 mm Hg      HCO3, Arterial 7 2 mmol/L      Base Excess, Arterial -20 2 mmol/L      O2 Content, Arterial 1 6 mL/dL      O2 HGB,Arterial  33 6 %      SOURCE Brachial, Right     Nasal Cannula 2 5 lpm nasal cannula    Lactic acid x2 [[id number]]  (Normal) Collected:  09/23/19 2025    Lab Status:  Final result Specimen:  Blood from Arm, Left Updated:  09/23/19 2056     LACTIC ACID 1 6 mmol/L     Narrative:       Result may be elevated if tourniquet was used during collection  APTT [[id number]]  (Normal) Collected:  09/23/19 2025    Lab Status:  Final result Specimen:  Blood from Arm, Left Updated:  09/23/19 2051     PTT 36 seconds     Protime-INR [[id number]]  (Abnormal) Collected:  09/23/19 2025    Lab Status:  Final result Specimen:  Blood from Arm, Left Updated:  09/23/19 2051     Protime 16 4 seconds      INR 1 31    Blood gas, Venous [[id number]]  (Abnormal) Collected:  09/23/19 2025    Lab Status:  Final result Specimen:  Blood from Arm, Left Updated:  09/23/19 2038     pH, Mio 7 070     pCO2, Mio 22 0 mm Hg      pO2, Mio 147 9 mm Hg      HCO3, Mio 6 2 mmol/L      Base Excess, Mio -21 7 mmol/L      O2 Content, Mio 5 4 ml/dL      O2 HGB, VENOUS 93 5 %     Procalcitonin [[id number]] Collected:  09/23/19 2025    Lab Status: In process Specimen:  Blood from Arm, Left Updated:  09/23/19 2030    Blood culture #2 [[id number]] Collected:  09/23/19 2025    Lab Status: In process Specimen:  Blood from Arm, Left Updated:  09/23/19 2030    Blood culture #1 [[id number]] Collected:  09/23/19 2025    Lab Status: In process Specimen:  Blood from Arm, Right Updated:  09/23/19 2030               XR chest 1 view portable   ED Interpretation by Stacey Alexis MD (09/24 0124)   Endotracheal tube terminating approximately 3 5 cm above james  OG tube with tip terminating in stomach        XR chest 1 view portable   ED Interpretation by Stacey Alexis MD (09/24 0122)   Right internal jugular central line with tip terminating at the cavoatrial junction      XR chest portable   ED Interpretation by Stacey Alexis MD (09/24 0121)   Right lower lobe infiltrate Procedures  Intubation  Date/Time: 9/23/2019 11:00 PM  Performed by: Gloria Arellano MD  Authorized by: Gloria Arellano MD     Patient location:  ED  Consent:     Consent obtained:  Verbal and emergent situation    Consent given by:  Patient (Patient's wife)    Alternatives discussed:  Delayed treatment  Universal protocol:     Patient identity confirmed:  Arm band  Pre-procedure details:     Patient status:  Altered mental status    Mallampati score:  2    Paralytics:  Succinylcholine  Indications:     Indications for intubation: respiratory distress and airway protection    Procedure details:     Preoxygenation:  Bag valve mask    Intubation method:  Oral    Oral intubation technique:  Glidescope    Laryngoscope blade: Mac 3    Tube size (mm):  7 0    Tube type:  Cuffed    Number of attempts:  2    Ventilation between attempts: yes      Cricoid pressure: yes      Tube visualized through cords: yes    Placement assessment:     ETT to teeth:  23    Tube secured with:  ETT claudio    Breath sounds:  Equal and absent over the epigastrium    Placement verification: chest rise, condensation, CXR verification, direct visualization, equal breath sounds and ETCO2 detector      CXR findings:  ETT in proper place  Post-procedure details:     Patient tolerance of procedure: Tolerated well, no immediate complications  Central Line  Date/Time: 9/24/2019 1:15 AM  Performed by: Gloria Arellano MD  Authorized by: Gloria Arellano MD     Patient location:  ED  Consent:     Consent obtained:  Written    Consent given by:  Spouse (Wife)    Risks discussed:  Incorrect placement, bleeding, infection and pneumothorax    Alternatives discussed:  Delayed treatment  Universal protocol:     Procedure explained and questions answered to patient or proxy's satisfaction: yes      Patient identity confirmed:  Arm band  Pre-procedure details:     Hand hygiene: Hand hygiene performed prior to insertion      Sterile barrier technique:  All elements of maximal sterile technique followed      Skin preparation:  2% chlorhexidine    Skin preparation agent: Skin preparation agent completely dried prior to procedure    Indications:     Central line indications: medications requiring central line and no peripheral vascular access      Site selection rationale:  Compressible site in a patient on Plavix  Anesthesia (see MAR for exact dosages): Anesthesia method:  Local infiltration    Local anesthetic:  Lidocaine 1% w/o epi  Procedure details:     Location:  Right internal jugular    Vessel type: vein      Laterality:  Right    Patient position:  Flat    Catheter type:  Triple lumen 20cm    Catheter size:  7 Fr    Ultrasound guidance: yes      Sterile ultrasound techniques: Sterile gel and sterile probe covers were used      Number of attempts:  1  Post-procedure details:     Post-procedure:  Dressing applied and line sutured    Assessment:  Blood return through all ports, no pneumothorax on x-ray, free fluid flow and placement verified by x-ray    Post-procedure complications: none      Patient tolerance of procedure:   Tolerated well, no immediate complications  Comments:      Secured at 15 cm at skin  CriticalCare Time  Performed by: Bryson Obrien MD  Authorized by: Bryson Obrien MD     Critical care provider statement:     Critical care time (minutes):  53    Critical care start time:  9/23/2019 8:07 PM    Critical care end time:  9/23/2019 9:00 PM    Critical care time was exclusive of:  Separately billable procedures and treating other patients    Critical care was necessary to treat or prevent imminent or life-threatening deterioration of the following conditions:  Renal failure, respiratory failure, sepsis and shock    Critical care was time spent personally by me on the following activities:  Blood draw for specimens, obtaining history from patient or surrogate, development of treatment plan with patient or surrogate, discussions with consultants, evaluation of patient's response to treatment, examination of patient, ordering and performing treatments and interventions, ordering and review of laboratory studies, ordering and review of radiographic studies, re-evaluation of patient's condition, review of old charts and ventilator management      ED Course       EKG NSR 65, P are 128, , , right axis deviation, T-wave abnormalities in lateral precordial leads with T-wave inversions in high lateral leads, no STEMI  51 y o  male presenting with weakness and diaphoresis  VS reviewed, afebrile, BP soft, 94/48, saturating in 90s on RA but tachypneic at 22  DDx includes underlying infection including pneumonia, sepsis, diabetic ketoacidosis, with other etiologies including heart failure, acute coronary syndrome, versus another etiology of symptoms  Patient answering questions, however, he is mainly resting with his eyes closed  Patient placed on supplemental O2 given tachypnea  Overall, given appearance of the patient, concern for sepsis versus another critical condition  Blood cultures obtained and sent  Broad-spectrum antibiotics administered, including vancomycin 20 milligrams/kilogram and cefepime 1000 mg  30 milliliters/kilogram normal lactated Ringer's bolus started  A total of 2 L of lactated Ringer's administered  Unfortunately, there is a challenge with intravenous access, patient's initial blood gas revealed pH of 7 07 with bicarb of 22 and base excess of -21, indicating the patient is critically ill  His initial set of labs hemolyzed  I discussed with patient and his wife placing a central venous access  Full informed consent, right internal jugular central venous line was placed as per procedure note above  Patient continued to become more and more obtunded  Decision made to secure the patient's airway  Patient intubated, as per procedure block above  Labs revealed hemoglobin of 2 7   Emergent uncrossed O negative unit of blood transfusion started  Patient's potassium 6 1, insulin 5 units IV, dextrose 25 g IV, as well as albuterol 5 mg breathing treatment administered  Rest of the patient's BMP reveals bicarb of 9 with creatinine of 6 9, concerning for metabolic acidosis and acute kidney injury  Glucose is 307  Patient's lactate is 3 7  Troponin is 0 8, possibly elevated secondary to end-organ damage versus due to failure to clear given acute kidney injury  Patient's OG tube has coffee-ground output, Protonix 80 mg IV administered  Hypothermic just prior to intubation, patient placed under warming blanket  Following 1st unit of uncrossed blood, patient's blood pressure improved up to 127/58  Informed consent obtained from patient's wife for further blood transfusion, 2nd unit of type and screened blood started  Initially, in discussion with patient's wife, she would prefer for patient to be transferred to [address] [place] [address] where patient had his renal transplant and where his nephrologist is  I discussed this with back and with call center with [address] [place] [address]  Dr Renita Ha accepts patient for admission, however, there are no ICU beds available and it is unlikely one would be available tonight  Plan made to admit patient to local intensive care unit and transfer patient once a bed becomes available at [facility]  In discussion with Dr Meseret Mercedes, [facility], patient is critically ill and would require a higher level of care than we are able to provide here  Patient will be transferred to [address], Dr Verona Rodriguez kindly accepts patient for admission  Patient's repeat blood gas is still with significant acidemia, ABG pH is 7 085 with bicarb of 7  Amp of bicarb IV administered      MDM  Number of Diagnoses or Management Options  Acute blood loss anemia: new and requires workup  Acute kidney injury [facility]): new and requires workup  Endotracheally intubated:   Hyperkalemia: new and does not require workup  Metabolic acidosis: new and requires workup  Upper GI bleed: new and requires workup     Amount and/or Complexity of Data Reviewed  Clinical lab tests: ordered and reviewed  Tests in the radiology section of CPT®: ordered and reviewed  Tests in the medicine section of CPT®: ordered and reviewed  Decide to obtain previous medical records or to obtain history from someone other than the patient: yes  Obtain history from someone other than the patient: yes  Review and summarize past medical records: yes  Discuss the patient with other providers: yes  Independent visualization of images, tracings, or specimens: yes    Risk of Complications, Morbidity, and/or Mortality  Presenting problems: high  Diagnostic procedures: high  Management options: high    Patient Progress  Patient progress: improved (Improvement in blood pressure, remains critically ill)      Disposition  Final diagnoses:   Upper GI bleed   Acute blood loss anemia   Metabolic acidosis   Endotracheally intubated   Hyperkalemia   Acute kidney injury ([facility] [address] )     Time reflects when diagnosis was documented in both MDM as applicable and the Disposition within this note     Time User Action Codes Description Comment    9/24/2019 12:09 AM Kim Forward Add [K92 2] Upper GI bleed     9/24/2019 12:09 AM Kim Forward Add [D62] Acute blood loss anemia     9/24/2019 12:09 AM Kim Forward Add [J91 6] Metabolic acidosis     0/69/2316 12:09 AM Kim Forward Add [Z97 8] Endotracheally intubated     9/24/2019  1:29 AM Kim Forward Add [E87 5] Hyperkalemia     9/24/2019  1:29 AM Kim Forward Add [N17 9] Acute kidney injury [facility])       ED Disposition     ED Disposition Condition Date/Time Comment    Transfer to Another Facility-In Network  Tue Sep 24, 2019 12:09 AM Anna Akins should be transferred out to [facility] MD Documentation      Most Recent Value   Patient Condition  An emergency transfer is being made prior to stabilization due to the need for definitive care and the benefit of transfer outweighs the risk   Reason for Transfer  Level of Care needed not available at this facility   Benefits of Transfer  Specialized equipment and/or services available at the receiving facility (Include comment)________________________ [Emergent upper endoscopy, ICU admission, hemodynamic monitoring]   Risks of Transfer  Potential for delay in receiving treatment, Potential deterioration of medical condition, Loss of IV, Increased discomfort during transfer, Possible worsening of condition or death during transfer   Accepting Physician  Dr Jose Mcdonald NameEliezer MD, Dr   Provider Certification  General risk, such as traffic hazards, adverse weather conditions, rough terrain or turbulence, possible failure of equipment (including vehicle or aircraft), or consequences of actions of persons outside the control of the transport personnel      RN Documentation      Most [facility] Name, Eliezer Amos      Follow-up Information    None       ED Provider  Electronically Signed by           Diego Persaud MD  09/24/19 6178

## 2019-09-24 NOTE — H&P
History & Physical Exam - Critical Care   Maritza Rucker 51 y o  male MRN: [id number]  Unit/Bed#: ICU 12 Encounter: [id number]      Assessment/Plan:  1  Acute blood loss anemia, possibly related to upper gastrointestinal bleeding  · Given the patient's profound drop in hemoglobin with associated metabolic acidosis and need for mechanical ventilation he will be admitted to the intensive care unit for evaluation and monitoring  This will likely require greater than a 2 midnight stay therefore the patient will be placed in inpatient status  · Was a GI consult is pending  He may need to undergo an EGD  · We will continue him on a Protonix infusion for now  2  Metabolic acidosis, likely multifactorial related to DKA, hyperkalemia, lactic acladiosis in the setting of #1   · We will place the patient on a bicarbonate infusion and will manage the DKA with the DKA protocol and monitor the electrolytes closely  3  Diabetes mellitus type 1 with hyperglycemia, possible DKA  · Patient received 2 L of fluid at [facility]   We will continue with the sodium bicarbonate infusion as noted above  · Will place the patient on a continuous insulin infusion with a goal to maintain his blood glucose in the 140-160 range  4  Acute respiratory failure likely secondary to his severe metabolic acidosis  · Hopefully we will be able to liberate the patient from the ventilator once his acidosis is resolved  5  MIKE and CKD stage IV s/p renal transplant on immunosuppressive therapy  · We will monitor the patient's urine output and renal indices closely  · We will obtain a Nephrology consult for the assistance with the care of this patient  · A Prograf level was sent and is pending  He may need adjustments to his Prograf dosing based on his elevation in creatinine level  · He he may need transfer to the [address] [place] [address] for management related to his previous renal transplant    According to the patient's wife he has been accepted for transfer there however they do not have a bed  We will need to reach out to the transplant center and discuss his care in more detail  6  Elevated troponin in the setting of demand ischemia secondary to #1  · We will trend the patient's troponin level  7  S/P right AKA      Critical Care Time:  42 minutes  Documented critical care time excludes any procedures documented elsewhere  It also excludes any family updates    _____________________________________________________________________      HPI:    Danyel Olson is a 51 y o  male who presents as a transfer from [address] [place] [address] after he presented there with a complaint of weakness  He was noted to have coffee-ground emesis  During his evaluation there he became unresponsive requiring emergent intubation  He was found to have a hemoglobin of 2 7  He was transfused 2 units of pack red blood cells  In addition he was found to have a severe metabolic acidosis with a initial pH of 7 point post 8 and a bicarbonate level of 7  In addition he was found to have a blood glucose of 350  Initial labs revealed a lactic acid level of 3 7  Currently the patient is intubated but is arousable  He denies any chest pain or shortness of breath at present       Review of Systems:    Full 12 point review of systems was performed  Aside from what was mentioned in the HPI, it is otherwise negative        Historical Information   Past Medical History:   Diagnosis Date    Bacteremia 12/21/2018    [facility]     [facility]     Diabetes mellitus type 1 [facility] [address] [facility]     GI bleed     Hypertension     Infection at site of external fixator pin ([facility] [address] )     [facility] infarction) [facility]     Pneumonia     Last Assessed [id number]    Renal failure     Renal transplant, status post 07/21/2007     Past Surgical History:   Procedure Laterality Date    ANKLE FRACTURE SURGERY      ANKLE HARDWARE REMOVAL Right 7/31/2017    Procedure: REMOVAL HARDWARE ANKLE;  Surgeon: Zoie Hernandez MD;  [facility];  Service: Orthopedics    ANKLE HARDWARE REMOVAL Right 8/17/2017    Procedure: TIBIA FAILED HARDWARE REMOVAL;  Surgeon: Zoie Hernandez MD;  [facility];  Service: Orthopedics    CARDIAC SURGERY      2 stents    CLOSED REDUCTION ANKLE Right 7/3/2017    Procedure: CLOSED REDUCTION DISTAL TIB-FIB AND CASTING VS;  Surgeon: Zoie Hernandez MD;  [facility];  Service: Orthopedics    ESOPHAGOGASTRODUODENOSCOPY N/A 12/20/2018    Procedure: ESOPHAGOGASTRODUODENOSCOPY (EGD) in ICU; Surgeon: Juan Askew MD;  [facility]: [facility]; Service: Gastroenterology    EYE SURGERY      FRACTURE SURGERY      ORIF Rt Ankle    GLUTAMIC ACID DECARBOXYLASE (HISTORICAL)      IR PICC LINE  8/20/2018    IR VENOUS LINE REMOVAL  10/5/2018    LEG AMPUTATION Right 02/01/2019    Above the knee    NEPHRECTOMY TRANSPLANTED ORGAN      OK OPEN TREATMENT FRACTURE DISTAL TIBIA FIBULA Right 7/3/2017    Procedure: OPEN REDUCTION W/ INTERNAL FIXATION (ORIF);   Surgeon: Zoie Hernandez MD;  [facility]: [facility];  Service: Orthopedics    TOE AMPUTATION Right 10/27/2016    Procedure: AMPUTATION TOE;  Surgeon: Rob Roberto DPM;  Location: [facility];  Service:      Social History   Social History     Substance and Sexual Activity   Alcohol Use Not Currently     Social History     Substance and Sexual Activity   Drug Use Never     Social History     Tobacco Use   Smoking Status Never Smoker   Smokeless Tobacco Never Used       Family History:   Family History   Problem Relation Age of Onset    Diabetes Brother     Coronary artery disease Mother        Medications:  Pertinent medications were reviewed    Current Facility-Administered Medications:  azaTHIOprine 50 mg Oral Daily Lavetta Soulier, CRNP    chlorhexidine 15 mL Swish & Spit Q12H [facility] Lavetta Soulier, CRNP    hydrocortisone sodium succinate 100 mg Intravenous [facility] Lavetta Soulier, CRNP    insulin regular (HumuLIN R,NovoLIN R) infusion 0 3-21 Units/hr Intravenous Titrated Anise Pesa, CRNP    pantoprozole (PROTONIX) infusion (Continuous) 8 mg/hr Intravenous Continuous Anise Pesa, CRNP    propofol 5-50 mcg/kg/min Intravenous Titrated Anise Pesa, CRNP Last Rate: 50 mcg/kg/min (09/24/19 0304)   sodium bicarbonate infusion 100 mL/hr Intravenous Continuous Anise Pesa, CRNP    tacrolimus 1 mg Oral Q12H [address] Pau Martonik, CRNP          No Known Allergies      Vitals:   /62   Pulse 84   Temp (!) 96 1 °F (35 6 °C)   Resp (!) 23   Wt 46 8 kg (103 lb 2 8 oz)   SpO2 100%   BMI 17 71 kg/m²   Body mass index is 17 71 kg/m²  SpO2: 100 %,    ,        No intake or output data in the 24 hours ending 09/24/19 0313  Invasive Devices     Central Venous Catheter Line            CVC Central Lines 09/23/19 Triple less than 1 day          Peripheral Intravenous Line            Peripheral IV 09/23/19 Left Antecubital less than 1 day          Drain            NG/OG/Enteral Tube Orogastric Center mouth less than 1 day    Urethral Catheter Latex; Temperature probe 16 Fr  less than 1 day          Airway            ETT  Cuffed 7 mm less than 1 day                Physical Exam:  Gen:  Sleepy but arousable  HEENT:  Pupils are equal round reactive to light  The oropharynx is intubated but otherwise clear  His conjunctivae are slightly pale  Neck:  Supple negative for lymphadenopathy  Chest:  Essentially clear to auscultation bilaterally  Cor:  Regular rate and rhythm  Abd:  Distended but soft and nontender  Ext:  He is status post AKA of the right lower extremity  Neuro:  Sleepy but arousable, able to move his extremities  Skin:  Pale, warm and dry      Diagnostic Data:  Lab: I have personally reviewed pertinent lab results  ,   CBC:  Results from last 7 days   Lab Units 09/24/19  0235   WBC Thousand/uL 11 61*   HEMOGLOBIN g/dL 6 9*   HEMATOCRIT % 22 0*   PLATELETS Thousands/uL 180      CMP: Lab Results   Component Value Date    SODIUM 136 09/24/2019    K 4 6 09/24/2019     09/24/2019    CO2 10 (L) 09/24/2019     (H) 09/24/2019    CREATININE 6 60 (H) 09/24/2019    CALCIUM 7 7 (L) 09/24/2019    AST 21 09/24/2019    ALT 14 09/24/2019    ALKPHOS 66 09/24/2019    EGFR 9 09/24/2019   ,   PT/INR:   Lab Results   Component Value Date    INR 1 31 (H) 09/23/2019   ,   Magnesium: No components found for: MAG,  Phosphorous: No results found for: PHOS    ABG: Lab Results   Component Value Date    PHART 7 085 (LL) 09/24/2019    AJV9DZL 24 2 (LL) 09/24/2019    PO2ART 486 2 (H) 09/24/2019    TZQ3SZO 7 1 (L) 09/24/2019    BEART -20 8 09/24/2019    SOURCE Radial, Right 09/24/2019   ,     Microbiology:      Imaging: I have personally reviewed the pertinent imaging studies on the PACS system  Chest x-ray reveals the endotracheal tube to be appropriately positioned above the james  There is no evidence of focal infiltrate    Cardiac/EKG/telemetry/Echo:   Results from last 7 days   Lab Units 09/23/19  2352   TROPONIN I ng/mL 0 80*           VTE Prophylaxis: Sequential compression device Mohini Parker)     Code Status: Level 1 - Full Code    GAVINO Kendall    Portions of the record may have been created with voice recognition software  Occasional wrong word or "sound a like" substitutions may have occurred due to the inherent limitations of voice recognition software  Read the chart carefully and recognize, using context, where substitutions have occurred

## 2019-09-24 NOTE — RESPIRATORY THERAPY NOTE
Vapotherm therapy       An abg was ordered, it appears to be venous, but the ph is still low, doctor stated PCO2 also is low, so we have placed him on the vapotherm to help him decrease his work of breathing

## 2019-09-24 NOTE — PROGRESS NOTES
Report called to [facility] [place]  Wife updated via telephone  Report given to life flight  No belongings at bedside  Iv site flushed and intact, CL lines flushed and infusing

## 2019-09-24 NOTE — ED NOTES
Patient received 2000 mL of Lactated Ringers  Dr Indra Bullock made aware       Suly Lainez RN  09/24/19 0003

## 2019-09-24 NOTE — CONSULTS
Patient MRN: [id number]  Date of Service: 9/24/2019  Referring Physician: Dr Benita Cancino  Provider Creating Note: GAVINO Craig  PCP: Mirela Segovia  Reason for Consult:  Coffee-ground emesis with profound anemia  HPI  Nicolette Gay is a 51 y o  male who was admitted with Acute blood loss anemia  He had presented to the emergency room at [facility] complaining of weakness for several days  He was noted to have coffee-ground emesis in the ER and during his evaluation he became unresponsive and required emergent intubation  Original hemoglobin was 2 7  He was transfused with 2 units of packed red cells and then a 3rd and current hemoglobin is 7 4  He was found to be a in severe metabolic acidosis   He has a history of upper GI bleed in the past on 12/20/2018 he had an EGD which showed a clot and questionable mass  He was stabilized and was to have repeat EGD but after discharge he developed hematemesis again and was flown to [facility] at which time he had an EGD on 12/20/2018 which showed severe distal esophagitis with proximal gastritis  Repeat EGD on 01/11/2019 showed resolving bleed with residual esophagitis and duodenitis  Patient had had no symptoms of bleeding since that time  Patient states that he has had no epigastric pain no reflux or burning no melena or bright red blood per rectum  BUN is 154 creatinine is 6 9  He does have a history of renal transplant    Past Medical History:   Diagnosis Date    Bacteremia 12/21/2018    [facility] [address] [facility]     Diabetes mellitus ([facility] [address] )     Diabetes mellitus type 1 ([facility] [address] )     GI bleed     Hypertension     Infection at site of external fixator pin ([address] )     MI (myocardial infarction) ([facility] [address] )     Pneumonia     Last Assessed [id number]    Renal failure     Renal transplant, status post 07/21/2007     Past Surgical History:   Procedure Laterality Date    ANKLE FRACTURE SURGERY      ANKLE HARDWARE REMOVAL Right 7/31/2017    Procedure: REMOVAL HARDWARE ANKLE;  Surgeon: Josafat Nava MD;  [facility]: [facility];  Service: Orthopedics    ANKLE HARDWARE REMOVAL Right 8/17/2017    Procedure: TIBIA FAILED HARDWARE REMOVAL;  Surgeon: Josafat Nava MD;  [facility]: [facility];  Service: Orthopedics    CARDIAC SURGERY      2 stents    CLOSED REDUCTION ANKLE Right 7/3/2017    Procedure: CLOSED REDUCTION DISTAL TIB-FIB AND CASTING VS;  Surgeon: Josafat Nava MD;  [facility]: [facility];  Service: Orthopedics    ESOPHAGOGASTRODUODENOSCOPY N/A 12/20/2018    Procedure: ESOPHAGOGASTRODUODENOSCOPY (EGD) in ICU; Surgeon: Marques Glasgow MD;  [facility]: [facility]; Service: Gastroenterology    EYE SURGERY      FRACTURE SURGERY      ORIF Rt Ankle    GLUTAMIC ACID DECARBOXYLASE (HISTORICAL)      IR PICC LINE  8/20/2018    IR VENOUS LINE REMOVAL  10/5/2018    LEG AMPUTATION Right 02/01/2019    Above the knee    NEPHRECTOMY TRANSPLANTED ORGAN      CO OPEN TREATMENT FRACTURE DISTAL TIBIA FIBULA Right 7/3/2017    Procedure: OPEN REDUCTION W/ INTERNAL FIXATION (ORIF); Surgeon: Josafat Nava MD;  [facility]: [facility];  Service: Orthopedics    TOE AMPUTATION Right 10/27/2016    Procedure: AMPUTATION TOE;  Surgeon: Suzanne Richardson DPM;  Location: [facility];  Service:      Medications  Home Medications:   Prior to Admission medications    Medication Sig Start Date End Date Taking?  Authorizing Provider   torsemide (DEMADEX) 20 mg tablet Take 3 tablets (60 mg total) by mouth daily 3/27/19  Yes Bryan Murray DO   albuterol (2 5 mg/3 mL) 0 083 % nebulizer solution Inhale 2 5 mg 2/8/19   Historical Provider, MD   atorvastatin (LIPITOR) 80 mg tablet Take 80 mg by mouth daily     Historical Provider, MD   azaTHIOprine (IMURAN) 50 mg tablet Take 50 mg by mouth 10/18/18   Historical Provider, MD   calcitriol (ROCALTROL) 0 5 MCG capsule Take 1 mcg by mouth daily Monday Wednesday, Friday    Historical Provider, MD   calcium acetate (PHOSLO) capsule Take 667 mg by mouth 10/18/18   Historical Provider, MD   clopidogrel (PLAVIX) 75 mg tablet Take 75 mg by mouth 10/22/18   Historical Provider, MD   insulin detemir (LEVEMIR) 100 units/mL subcutaneous injection Inject 10 Units under the skin 2 (two) times a day 4/8/19   [place] Ped, DO   insulin lispro (HUMALOG) 100 units/mL injection Inject 30 Units under the skin daily 4/8/19   [place] Ped, DO   lansoprazole (PREVACID SOLUTAB) 30 mg disintegrating tablet Take 30 mg by mouth 1/12/19   Historical Provider, MD   metoprolol succinate (TOPROL-XL) 25 mg 24 hr tablet Take 2 tablets (50 mg total) by mouth daily 4/8/19   [place] Ped, DO   Multiple Vitamins-Minerals (MULTIVITAMIN ADULT) TABS Take 1 tablet by mouth 10/18/18   Historical Provider, MD   NIFEDIPINE PO Take 30 mg by mouth daily     Historical Provider, MD   ONE TOUCH ULTRA TEST test strip CHECK BLOOD SUGAR 5 TO 6 TIMES DAILY 7/18/19   [facility], DO   predniSONE 5 mg tablet Take 5 mg by mouth daily    Historical Provider, MD   Sod Citrate-Citric Acid (CYTRA-2) 500-334 MG/5ML SOLN Take 30 mL by mouth 2/9/19   Historical Provider, MD   tacrolimus (PROGRAF) 1 mg capsule 3 caps in the AM, 2 caps PM/HS 3/4/19   [facility], DO       Inhouse Medications    Current Facility-Administered Medications:     azaTHIOprine (IMURAN) tablet 50 mg, 50 mg, Oral, Daily    chlorhexidine (PERIDEX) 0 12 % oral rinse 15 mL, 15 mL, Swish & Spit, Q12H [address]    dextrose 5 % and sodium chloride 0 9 % infusion, 125 mL/hr, Intravenous, Continuous    hydrocortisone sodium succinate (PF) (Solu-CORTEF) injection 100 mg, 100 mg, Intravenous, Q8H JAMARCUS, 100 mg at 09/24/19 0557    insulin regular (HumuLIN R,NovoLIN R) 1 Units/mL in sodium chloride 0 9 % 100 mL infusion, 0 1-30 Units/hr, Intravenous, Continuous, 18 8 Units/hr at 09/24/19 0701    magnesium sulfate 2 g/50 mL IVPB (premix) 2 g, 2 g, Intravenous, Once    pantoprazole (PROTONIX) 80 mg in sodium chloride 0 9 % 100 mL infusion, 8 mg/hr, Intravenous, Continuous, 8 mg/hr at 09/24/19 0330    potassium chloride 20 mEq IVPB (premix), 20 mEq, Intravenous, Once    propofol (DIPRIVAN) 1000 mg in 100 mL infusion (premix), 5-50 mcg/kg/min, Intravenous, Titrated, Stopped at 09/24/19 0750    sodium bicarbonate 150 mEq in dextrose 5 % 1,000 mL infusion, 100 mL/hr, Intravenous, Continuous, 100 mL/hr at 09/24/19 0323    tacrolimus (PROGRAF) capsule 2 mg, 2 mg, Oral, QPM    tacrolimus (PROGRAF) capsule 3 mg, 3 mg, Oral, Daily    Allergies  No Known Allergies  Social History   reports that he has never smoked  He has never used smokeless tobacco  He reports that he drank alcohol  He reports that he does not use drugs  Family History  Family History   Problem Relation Age of Onset    Diabetes Brother     Coronary artery disease Mother      ROS  ROS: Denies CP, SOB, fever, weight loss  Positive weakness and fatigue/coffee-ground emesis  Denies bright red blood per rectum or melena or abdominal pain  All others negative except as noted in HPI  Objective   Vitals  Blood pressure (!) 178/76, pulse 96, temperature 99 °F (37 2 °C), resp  rate (!) 33, height 5' 4" (1 626 m), weight 46 8 kg (103 lb 2 8 oz), SpO2 100 %  General: Alert, no apparent distress  Eyes: No scleral icterus  ENT:  Intubated, OG tube with small amount of coffee-ground material  Card: RRR no murmur  Lungs: Clear to ascultation b/l  No wheezes, rales, rhonchi  Abdomen: Soft  Nontender  Nondistended  Bowel sounds present and normoactive     Skin: No jaundice  Neuro: Alert, responds to questions appropriately  Extremities:  Right AKA        Laboratory Studies  Lab Results   Component Value Date    WBC 10 51 (H) 09/24/2019    HGB 7 5 (L) 09/24/2019    HCT 23 6 (L) 09/24/2019     09/24/2019    MCV 92 09/24/2019     Lab Results   Component Value Date    CREATININE 6 67 (H) 09/24/2019     (H) 09/24/2019    SODIUM 137 09/24/2019    K 3 9 09/24/2019     09/24/2019    CO2 10 (L) 09/24/2019 GLUCOSE 460 (H) 02/14/2018    CALCIUM 7 3 (L) 09/24/2019    PROT 6 8 11/06/2015    ALKPHOS 59 09/24/2019    BILITOT 0 45 11/06/2015    AST 24 09/24/2019    ALT 14 09/24/2019     Lab Results   Component Value Date    PROTIME 16 8 (H) 09/24/2019    INR 1 34 (H) 09/24/2019       Imaging and Other Studies      Assessment and Plan:  1  Hematemesis with profound anemia  Patient with history of erosive esophagitis and duodenitis in the past with upper GI bleed  Patient will be scheduled for an EGD tomorrow  Frequent H&H, transfuse as needed  Continue Protonix drip  2  Metabolic acidosis likely multifactorial related to DKA, hyperkalemia and lactic acidosis  Hyperkalemia corrected  3  Diabetes mellitus type 1  4  Acute respiratory failure secondary to severe metabolic acidosis  Patient to be extubated today      5   Elevated troponin likely demand ischemia from profound anemia    Principal Problem:    Acute blood loss anemia  Active Problems:    Type 1 diabetes mellitus ([facility] [address] )    Renal transplant, status post    Acute kidney injury ([facility] [address] )    Chronic kidney disease, stage IV (severe) (HCC)    Immunosuppression (HCC)    Non-ST elevation myocardial infarction (NSTEMI), type 2    Metabolic acidosis    Gastrointestinal hemorrhage    Acute respiratory failure ([facility] [address] )      GAVINO Ornelas

## 2019-09-24 NOTE — SOCIAL WORK
Patient transferring to [facility] for a higher level of medical care via RN Flight Crew  CM faxed the trasnport request and medical necessity to [facility] at [phone]  The original was placed on the MR to be scanned into the media tab fo the patients chart

## 2019-09-24 NOTE — EMTALA/ACUTE CARE TRANSFER
[address] [facility] EMERGENCY DEPARTMENT  [address] [facility] [address]  Dept: [phone]      EMTALA TRANSFER CONSENT    NAME Elliott SPENCE 1970                              MRN [id number]    I have been informed of my rights regarding examination, treatment, and transfer   by Dr Ibrahima Hernandez MD    Benefits:      Risks:        Consent for Transfer:  I acknowledge that my medical condition has been evaluated and explained to me by the emergency department physician or other qualified medical person and/or my attending physician, who has recommended that I be transferred to the service of    at    The above potential benefits of such transfer, the potential risks associated with such transfer, and the probable risks of not being transferred have been explained to me, and I fully understand them  The doctor has explained that, in my case, the benefits of transfer outweigh the risks  I agree to be transferred  I authorize the performance of emergency medical procedures and treatments upon me in both transit and upon arrival at the receiving facility  Additionally, I authorize the release of any and all medical records to the receiving facility and request they be transported with me, if possible  I understand that the safest mode of transportation during a medical emergency is an ambulance and that the Hospital advocates the use of this mode of transport  Risks of traveling to the receiving facility by car, including absence of medical control, life sustaining equipment, such as oxygen, and medical personnel has been explained to me and I fully understand them  (LARISSA CORRECT BOX BELOW)  [  ]  I consent to the stated transfer and to be transported by ambulance/helicopter  [  ]  I consent to the stated transfer, but refuse transportation by ambulance and accept full responsibility for my transportation by car    I understand the risks of non-ambulance transfers and I exonerate the Hospital and its staff from any deterioration in my condition that results from this refusal     X___________________________________________    DATE  19  TIME________  Signature of patient or legally responsible individual signing on patient behalf           RELATIONSHIP TO PATIENT_________________________          Provider Certification    NAME Jus SPENCE 1970                              MRN [id number]    A medical screening exam was performed on the above named patient  Based on the examination:    Condition Necessitating Transfer The primary encounter diagnosis was Upper GI bleed  Diagnoses of Acute blood loss anemia, Metabolic acidosis, and Endotracheally intubated were also pertinent to this visit  Patient Condition:      Reason for Transfer:      Transfer Requirements: Facility     · Space available and qualified personnel available for treatment as acknowledged by    · Agreed to accept transfer and to provide appropriate medical treatment as acknowledged by          · Appropriate medical records of the examination and treatment of the patient are provided at the time of transfer   [address] [facility], [address] _______  · Transfer will be performed by qualified personnel from    and appropriate transfer equipment as required, including the use of necessary and appropriate life support measures      Provider Certification: I have examined the patient and explained the following risks and benefits of being transferred/refusing transfer to the patient/family:         Based on these reasonable risks and benefits to the patient and/or the unborn child(tiesha), and based upon the information available at the time of the patients examination, I certify that the medical benefits reasonably to be expected from the provision of appropriate medical treatments at another medical facility outweigh the increasing risks, if any, to the individuals medical condition, and in the case of labor to the unborn child, from effecting the transfer      X____________________________________________ DATE 09/24/19        TIME_______      ORIGINAL - SEND TO MEDICAL RECORDS   COPY - SEND WITH PATIENT DURING TRANSFER

## 2019-09-24 NOTE — UTILIZATION REVIEW
Initial Clinical Review    TRANSFER FROM   [facility]   ED    Admission: Date/Time/Statement: Inpatient Admission Orders (From admission, onward)     Ordered        09/24/19 0238  Inpatient Admission  Once                   Orders Placed This Encounter   Procedures    Inpatient Admission     Standing Status:   Standing     Number of Occurrences:   1     Order Specific Question:   Admitting Physician     Answer:   Nasir Chou [[id number]]     Order Specific Question:   Level of Care     Answer:   Critical Care [15]     Order Specific Question:   Estimated length of stay     Answer:   More than 2 Midnights     Order Specific Question:   Certification     Answer:   I certify that inpatient services are medically necessary for this patient for a duration of greater than two midnights  See H&P and MD Progress Notes for additional information about the patient's course of treatment  No chief complaint on file  Assessment/Plan:     51 Y O  male  Presented  To ED    @  [facility]  From home  With weakness and coffee ground  Emesis  During  Ed e jose alfredo,  Pt  Became unresponsive and  req'd  Emergent  Intubation  PMH  Is    S/P  R  BKA,   CKD  Stage   4, S/P   Renal transplant,   And    DM 1  W/U revealed  A  hmgb  Of  2 7, severe  Metabolic acidosis with initial  Ph  Of 7 and  Bicarb  Of 7,  BS  380 and lactic acid    3 7  Transferred  To [employer]  For  Higher  LOC   And  Admitted  ICU  LOC  With   Acute  Blood  Loss   Anemia, poss  R/T  GIB, Acute  Respiratory failure  2nd  To severe metabolic acidosis,  MIKE on  CKD ,  And  Elevated  Troponin and plan is  GI  Consult,  Monitor  Labs, vent  Support  And  Insulin  Drip  Per  GI Consult:     Hematemesis with profound anemia  Patient with history of erosive esophagitis and duodenitis in the past with upper GI bleed  Patient will be scheduled for an EGD tomorrow  Frequent H&H, transfuse as needed  Continue Protonix drip    2  Metabolic acidosis likely multifactorial related to DKA, hyperkalemia and lactic acidosis  Hyperkalemia corrected  3  Diabetes mellitus type 1  4  Acute respiratory failure secondary to severe metabolic acidosis  Patient to be extubated today  5   Elevated troponin likely demand ischemia from profound anemia    Per  Nephrology   Consult:     Acute kidney injury (POA) on CKD stage 5 T:  - MIKE most likely secondary to progression to ESRD plus hemorrhagic shock + pre renal azotemia  - After review of records it appears that the patient has a baseline Creatinine of 4 5mg/dL  - patient was admitted with a creatinine of 6 90 mg/dL  - patient's creatinine today is at 6 67 mg/dL  - clinically patient appears to be euvolemic to minimally hypervolemic  - Avoid nephrotoxins, adjust meds to appropriate GFR   - Acid base and lytes stable except anion gap metabolic acidosis, hyperphosphatemia  - had a detailed discussion with the patient that in light of his renal parameters and overall condition we should pursue renal replacement therapy with dialysis  Explained all risks and benefits to the patient in depth including death however patient refuses to undergo dialysis understands the risks  Wishes for only medical management at this time  I have discussed this with the team   Patient is awaiting transfer [facility]  - Optimize hemodynamic status to avoid delay in renal recovery  - check BMP, magnesium, phosphorus Q 4   - Check renal ultrasound to r/o hydronephrosis, obstruction   - Await renal recovery  - Place on a renal diet when allowed diet order    - Strict I/O   - history of renal transplants x2 last 1 was in 2001   - on Prograf 3 mg p o  Q a m  And 2 mg p o  Q p m         ED Triage Vitals   Temperature Pulse Respirations Blood Pressure SpO2   09/24/19 0243 09/24/19 0243 09/24/19 0243 09/24/19 0230 09/24/19 0243   (!) 96 1 °F (35 6 °C) 84 (!) 23 133/62 100 %      Temp src Heart Rate Source Patient Position - Orthostatic VS BP Location FiO2 (%)   -- -- -- -- --             Pain Score       09/24/19 0240       No Pain        Wt Readings from Last 1 Encounters:   09/24/19 46 8 kg (103 lb 2 8 oz)     Additional Vital Signs:   24/19 1045  99 °F (37 2 °C)  80  22  152/70  110  100 %   09/24/19 1000  99 °F (37 2 °C)  94  25Abnormal   184/88Abnormal   129  100 %   09/24/19 0900  99 3 °F (37 4 °C)  92  31Abnormal   185/80Abnormal   133  100 %   09/24/19 0800  99 °F (37 2 °C)  96  33Abnormal   178/76Abnormal   120  100 %   09/24/19 0745  99 °F (37 2 °C)             09/24/19 0700  98 6 °F (37 °C)  80  22  140/66  92  100 %   09/24/19 0600  98 2 °F (36 8 °C)  80  22  134/63  90  100 %   09/24/19 0530  97 9 °F (36 6 °C)  80  22  147/64    100 %   09/24/19 0415  97 5 °F (36 4 °C)  78  22  119/54    100 %   09/24/19 0400  97 5 °F (36 4 °C)  78  22  116/54    100 %   09/24/19 0339  97 2 °F (36 2 °C)Abnormal   76  22      100 %   09/24/19 0330        110/55  73     09/24/19 0300            100 %   09/24/19 0243  96 1 °F (35 6 °C)Abnormal   84  23Abnormal       100 %   09/24/19 0230        133/62  82           Pertinent Labs/Diagnostic Test Results:   Results from last 7 days   Lab Units 09/24/19  0554 09/24/19  0235 09/23/19  2303   WBC Thousand/uL 10 51* 11 61* 12 77*   HEMOGLOBIN g/dL 7 5* 6 9* 2 7*   HEMATOCRIT % 23 6* 22 0* 9 3*   PLATELETS Thousands/uL 171 180 338   NEUTROS ABS Thousands/µL 7 70*  --  11 12*         Results from last 7 days   Lab Units 09/24/19  0554 09/24/19  0235 09/23/19  2303   SODIUM mmol/L 137 136 133*   POTASSIUM mmol/L 3 9 4 6 6 1*   CHLORIDE mmol/L 107 107 106   CO2 mmol/L 10* 10* 9*   ANION GAP mmol/L 20* 19* 18*   BUN mg/dL 153* 147* 154*   CREATININE mg/dL 6 67* 6 60* 6 90*   EGFR ml/min/1 73sq m 9 9 9   CALCIUM mg/dL 7 3* 7 7* 6 7*   CALCIUM, IONIZED mmol/L 1 12 1 14  --    MAGNESIUM mg/dL 1 9  --   --    PHOSPHORUS mg/dL 7 8*  --   --      Results from last 7 days   Lab Units 09/24/19  0554 09/24/19  0235 09/23/19  2303   AST U/L 24 21 15   ALT U/L 14 14 14   ALK PHOS U/L 59 66 61   TOTAL PROTEIN g/dL 4 6* 5 0* 4 5*   ALBUMIN g/dL 1 9* 2 1* 1 9*   TOTAL BILIRUBIN mg/dL 0 58 0 54 0 30     Results from last 7 days   Lab Units 09/24/19  1034 09/24/19  0923 09/24/19  0833 09/24/19  0711 09/24/19  0553 09/24/19  0502 09/24/19  0359 09/24/19  0231   POC GLUCOSE mg/dl 113 126 192* 274* 354* 331* 402* 417*     Results from last 7 days   Lab Units 09/24/19  0554 09/24/19  0235 09/23/19  2303   GLUCOSE RANDOM mg/dL 311* 350* 307*             BETA-HYDROXYBUTYRATE   Date Value Ref Range Status   12/19/2018 1 59 (H) 0 02 - 0 27 mmol/L Final      Results from last 7 days   Lab Units 09/24/19  0959 09/24/19  0301 09/24/19  0026   PH ART  7 247* 7 127* 7 085*   PCO2 ART mm Hg 21 7* 21 7* 24 2*   PO2 ART mm Hg 43 1* 212 7* 486 2*   HCO3 ART mmol/L 9 2* 7 0* 7 1*   BASE EXC ART mmol/L -16 3 -20 5 -20 8   O2 CONTENT ART mL/dL 11 3* 14 0* 7 6*   O2 HGB, ARTERIAL % 82 5* 98 7* 99 1*   ABG SOURCE  Radial, Right Radial, Left Radial, Right     Results from last 7 days   Lab Units 09/23/19  2025   PH JAMES  7 070*   PCO2 JAMES mm Hg 22 0*   PO2 JAMES mm Hg 147 9*   HCO3 JAMES mmol/L 6 2*   BASE EXC JAMES mmol/L -21 7   O2 CONTENT JAMES ml/dL 5 4   O2 HGB, VENOUS % 93 5*             Results from last 7 days   Lab Units 09/24/19  0928 09/24/19  0554 09/24/19  0307 09/23/19  2352   TROPONIN I ng/mL 14 01* 5 16* 1 78* 0 80*         Results from last 7 days   Lab Units 09/24/19  0554 09/24/19 0235 09/23/19 2025   PROTIME seconds 16 8*  --  16 4*   INR  1 34*  --  1 31*   PTT seconds  --  35 36         Results from last 7 days   Lab Units 09/23/19 2025   PROCALCITONIN ng/ml 0 90*     Results from last 7 days   Lab Units 09/24/19  0235 09/23/19 2258 09/23/19 2025   LACTIC ACID mmol/L 0 7 3 7* 1 6           Results from last 7 days   Lab Units 09/23/19  2352   CLARITY UA  Clear   COLOR UA  Yellow   SPEC GRAV UA  1 020   PH UA 5 5   GLUCOSE UA mg/dl Negative   KETONES UA mg/dl Negative   BLOOD UA  Trace-Intact*   PROTEIN UA mg/dl 100 (2+)*   NITRITE UA  Negative   BILIRUBIN UA  Negative   UROBILINOGEN UA E U /dl 0 2   LEUKOCYTES UA  Negative   WBC UA /hpf None Seen   RBC UA /hpf 0-1*   BACTERIA UA /hpf Occasional   EPITHELIAL CELLS WET PREP /hpf None Seen     CXR   ( 9/24)      Basilar consolidation in the right lung, potentially in the middle lobe   No left-sided consolidation   No pneumothorax or effusion  Normal cardiomediastinal silhouette and pulmonary vasculature  Present on Admission:   Acute kidney injury ([address] )   Chronic kidney disease, stage IV (severe) (HCC)   Type 1 diabetes mellitus (HCC)   Immunosuppression (HCC)   Non-ST elevation myocardial infarction (NSTEMI), type 2   Gastrointestinal hemorrhage      Admitting Diagnosis: Acute blood loss anemia [D62]  Age/Sex: 51 y o  male  Admission Orders:    Current Facility-Administered Medications:  dextrose      azaTHIOprine 50 mg Oral Daily   chlorhexidine 15 mL Swish & Spit Q12H [facility]   dextrose 125 mL/hr Intravenous Continuous   insulin regular (HumuLIN R,NovoLIN R) infusion 0 1-30 Units/hr Intravenous Continuous   pantoprozole (PROTONIX) infusion (Continuous) 8 mg/hr Intravenous Continuous   [START ON 9/25/2019] predniSONE 5 mg Oral Daily   propofol 5-50 mcg/kg/min Intravenous Titrated   sodium bicarbonate infusion 100 mL/hr Intravenous Continuous   tacrolimus 2 mg Oral QPM   tacrolimus 3 mg Oral Daily       IP CONSULT TO CASE MANAGEMENT  IP CONSULT TO NEPHROLOGY  IP CONSULT TO GASTROENTEROLOGY  IP CONSULT TO ANESTHESIOLOGY       PLAN  TRANSFER   TO [place]   9/24/2019    Network Utilization Review Department  Phone: [phone]; Fax [phone]  [email or web address]  ATTENTION: Please call with any questions or concerns to [phone]  and carefully listen to the prompts so that you are directed to the right person     Send all requests for admission clinical reviews, approved or denied determinations and any other requests to fax [phone]   All voicemails are confidential

## 2019-09-24 NOTE — RESPIRATORY THERAPY NOTE
RT Ventilator Management Note  Zoë Stern 51 y o  male MRN: [id number]  Unit/Bed#: EG21 Encounter: [id number]      Daily Screen       9/23/2019  2341             Patient safety screen outcome[de-identified]  Failed    Not Ready for Weaning due to[de-identified]  Underline problem not resolved; Recieving paralytics            Physical Exam:     Patient was on vapotherm, it was decided to intubate patient at this time  He was intubated with a size 7 ETT tube  Placement check via auscultation, bilateral and coarse, color change on the ETCO2 cap  Xray checked and verified by doctor  Tube secured with commercial tube claudio, on the right 23 at the lip   ABGs were drawn, results given to me by lab, I reported them face to face to Dr Rashawn Redman

## 2019-09-24 NOTE — ED NOTES
Attempt to call report to [facility] ICU x 3 with no answer       Juliette Smith RN  09/24/19 3775

## 2019-09-24 NOTE — ED NOTES
Sepsis alert called at [address] [place] [address], [address]  09/23/19 2032

## 2019-09-24 NOTE — DISCHARGE SUMMARY
Discharge Summary - Nani Snider 51 y o  male MRN: [id number]    Unit/Bed#: ICU 12 Encounter: [id number]    Admission Date:   Admission Orders (From admission, onward)     Ordered        09/24/19 0238  Inpatient Admission  Once                     Admitting Diagnosis: Acute blood loss anemia [D62]    HPI:  51year old male with a past medical history of bilateral renal transplants in 1989 and 2001 on immunosuppressants, diabetes mellitus type 2, and history of GI bleed  Patient presented to the ER at [facility] with a three day complaint of generalized weakness  He was initially resistant to be evaluated but his wife persuaded him to go to the ER  In the ER was found to have a hemoglobin of 2 7 with coffee ground emesis  Due to this was given 2 units of PRBC  He was found to have a severe anion and non anion gap metabolic acidosis  He was treated with 1 amp of bicarbonate infusion and was intubated for altered mental status  He was transferred to [facility] ICU for further management  Procedures Performed: No orders of the defined types were placed in this encounter  Summary of Hospital Course: Once transferred to [place] [address] ICU repeat labs were performed  He arrived on mechanical ventilation  He continued to be acidotic and was found to be in DKA  Due to this he was treated with aggressive IVF hydration and started on a bicarbonate infusion for a pH of 7 0 that only improved to 7 1  Currently he is on a bicarbonate infusion at 100 ml in sterile water, D5w at 125 ml/hr and receiving an insulin infusion  His recent blood glucose was 121 but appeared to have an acidosis due to hyperchloremia in addition to DKA  As per his GI bleed he had a small amount of coffee grounds in OG tube <100 ml with no significant bleeding  Was evaluated by GI and planned to have EGD this admission  He is currently on a Protonix infusion  During hospitalization stay had an elevation of troponin to 14 and EKG changes   This was discussed with cardiology and was thought to be related to anemia and acidosis  He was also found to have a creatinine of 6 9 which is up from his baseline of 4  Nephrology was consulted and recommended dialysis but patient was initially refusing  Discussed with patient about transfer to [place] and he was in agreement  Was plans on extubating patient due to improvement of mental status and tolerated well on weaning trial  However, remained severely acidotic and due to transfer will remain intubated  Significant Findings, Care, Treatment and Services Provided:   Blood cultures- pending    Complications:       Discharge Diagnosis:   Acute on chronic kidney disease stage IV  Acute blood loss anemia  Acute upper GI bleed  DKA  Non anion gap metabolic acidosis  Elevated troponin      Resolved Problems  Date Reviewed: 9/24/2019    None          Condition at Discharge: critical         Discharge instructions/Information to patient and family:   See after visit summary for information provided to patient and family  Provisions for Follow-Up Care:  See after visit summary for information related to follow-up care and any pertinent home health orders  PCP: Macy Wray DO    Disposition: [address]    Planned Readmission: No      Discharge Statement   I spent 28 minutes discharging the patient  This time was spent on the day of discharge  I had direct contact with the patient on the day of discharge  Additional documentation is required if more than 30 minutes were spent on discharge  Discharge Medications:  See after visit summary for reconciled discharge medications provided to patient and family

## 2019-09-24 NOTE — PLAN OF CARE
Problem: Potential for Falls  Goal: Patient will remain free of falls  Description  INTERVENTIONS:  - Assess patient frequently for physical needs  -  Identify cognitive and physical deficits and behaviors that affect risk of falls  -  [place] fall precautions as indicated by assessment   - Educate patient/family on patient safety including physical limitations  - Instruct patient to call for assistance with activity based on assessment  - Modify environment to reduce risk of injury  - Consider OT/PT consult to assist with strengthening/mobility  Outcome: Progressing     Problem: Prexisting or High Potential for Compromised Skin Integrity  Goal: Skin integrity is maintained or improved  Description  INTERVENTIONS:  - Identify patients at risk for skin breakdown  - Assess and monitor skin integrity  - Assess and monitor nutrition and hydration status  - Monitor labs   - Assess for incontinence   - Turn and reposition patient  - Assist with mobility/ambulation  - Relieve pressure over bony prominences  - Avoid friction and shearing  - Provide appropriate hygiene as needed including keeping skin clean and dry  - Evaluate need for skin moisturizer/barrier cream  - Collaborate with interdisciplinary team   - Patient/family teaching  - Consider wound care consult   Outcome: Progressing     Problem: Nutrition/Hydration-ADULT  Goal: Nutrient/Hydration intake appropriate for improving, restoring or maintaining nutritional needs  Description  Monitor and assess patient's nutrition/hydration status for malnutrition  Collaborate with interdisciplinary team and initiate plan and interventions as ordered  Monitor patient's weight and dietary intake as ordered or per policy  Utilize nutrition screening tool and intervene as necessary  Determine patient's food preferences and provide high-protein, high-caloric foods as appropriate       INTERVENTIONS:  - Monitor oral intake, urinary output, labs, and treatment plans  - Assess nutrition and hydration status and recommend course of action  - Evaluate amount of meals eaten  - Assist patient with eating if necessary   - Allow adequate time for meals  - Recommend/ encourage appropriate diets, oral nutritional supplements, and vitamin/mineral supplements  - Order, calculate, and assess calorie counts as needed  - Recommend, monitor, and adjust tube feedings and TPN/PPN based on assessed needs  - Assess need for intravenous fluids  - Provide specific nutrition/hydration education as appropriate  - Include patient/family/caregiver in decisions related to nutrition  Outcome: Progressing     Problem: RESPIRATORY - ADULT  Goal: Achieves optimal ventilation and oxygenation  Description  INTERVENTIONS:  - Assess for changes in respiratory status  - Assess for changes in mentation and behavior  - Position to facilitate oxygenation and minimize respiratory effort  - Oxygen administered by appropriate delivery if ordered  - Initiate smoking cessation education as indicated  - Encourage broncho-pulmonary hygiene including cough, deep breathe, Incentive Spirometry  - Assess the need for suctioning and aspirate as needed  - Assess and instruct to report SOB or any respiratory difficulty  - Respiratory Therapy support as indicated  Outcome: Progressing     Problem: METABOLIC, FLUID AND ELECTROLYTES - ADULT  Goal: Electrolytes maintained within normal limits  Description  INTERVENTIONS:  - Monitor labs and assess patient for signs and symptoms of electrolyte imbalances  - Administer electrolyte replacement as ordered  - Monitor response to electrolyte replacements, including repeat lab results as appropriate  - Instruct patient on fluid and nutrition as appropriate  Outcome: Progressing  Goal: Fluid balance maintained  Description  INTERVENTIONS:  - Monitor labs   - Monitor I/O and WT  - Instruct patient on fluid and nutrition as appropriate  - Assess for signs & symptoms of volume excess or deficit  Outcome: Progressing  Goal: Glucose maintained within target range  Description  INTERVENTIONS:  - Monitor Blood Glucose as ordered  - Assess for signs and symptoms of hyperglycemia and hypoglycemia  - Administer ordered medications to maintain glucose within target range  - Assess nutritional intake and initiate nutrition service referral as needed  Outcome: Progressing     Problem: HEMATOLOGIC - ADULT  Goal: Maintains hematologic stability  Description  INTERVENTIONS  - Assess for signs and symptoms of bleeding or hemorrhage  - Monitor labs  - Administer supportive blood products/factors as ordered and appropriate  Outcome: Progressing     Problem: SAFETY,RESTRAINT: NV/NON-SELF DESTRUCTIVE BEHAVIOR  Goal: Remains free of harm/injury (restraint for non violent/non self-detsructive behavior)  Description  INTERVENTIONS:  - Instruct patient/family regarding restraint use   - Assess and monitor physiologic and psychological status   - Provide interventions and comfort measures to meet assessed patient needs   - Identify and implement measures to help patient regain control  - Assess readiness for release of restraint   Outcome: Progressing  Goal: Returns to optimal restraint-free functioning  Description  INTERVENTIONS:  - Assess the patient's behavior and symptoms that indicate continued need for restraint  - Identify and implement measures to help patient regain control  - Assess readiness for release of restraint   Outcome: Progressing     Problem: SAFETY ADULT  Goal: Patient will remain free of falls  Description  INTERVENTIONS:  - Assess patient frequently for physical needs  -  Identify cognitive and physical deficits and behaviors that affect risk of falls    -  [place] fall precautions as indicated by assessment   - Educate patient/family on patient safety including physical limitations  - Instruct patient to call for assistance with activity based on assessment  - Modify environment to reduce risk of injury  - Consider OT/PT consult to assist with strengthening/mobility  Outcome: Progressing  Goal: Maintain or return to baseline ADL function  Description  INTERVENTIONS:  -  Assess patient's ability to carry out ADLs; assess patient's baseline for ADL function and identify physical deficits which impact ability to perform ADLs (bathing, care of mouth/teeth, toileting, grooming, dressing, etc )  - Assess/evaluate cause of self-care deficits   - Assess range of motion  - Assess patient's mobility; develop plan if impaired  - Assess patient's need for assistive devices and provide as appropriate  - Encourage maximum independence but intervene and supervise when necessary  - Involve family in performance of ADLs  - Assess for home care needs following discharge   - Consider OT consult to assist with ADL evaluation and planning for discharge  - Provide patient education as appropriate  Outcome: Progressing  Goal: Maintain or return mobility status to optimal level  Description  INTERVENTIONS:  - Assess patient's baseline mobility status (ambulation, transfers, stairs, etc )    - Identify cognitive and physical deficits and behaviors that affect mobility  - Identify mobility aids required to assist with transfers and/or ambulation (gait belt, sit-to-stand, lift, walker, cane, etc )  - [place] fall precautions as indicated by assessment  - Record patient progress and toleration of activity level on Mobility SBAR; progress patient to next Phase/Stage  - Instruct patient to call for assistance with activity based on assessment  - Consider rehabilitation consult to assist with strengthening/weightbearing, etc   Outcome: Progressing

## 2019-09-24 NOTE — PROGRESS NOTES
Spoke with Cardiology in regards to troponin increase to 14 and EKG changes  He reviewed EKG and appears to be similar to previous EKG in April 2019  Troponin elevation due to severe metabolic acidosis and no intervention needed at this time

## 2019-09-25 LAB
ATRIAL RATE: 65 BPM
PR INTERVAL: 128 MS
QRS AXIS: 114 DEGREES
QRSD INTERVAL: 102 MS
QT INTERVAL: 470 MS
QTC INTERVAL: 488 MS
T WAVE AXIS: 139 DEGREES
VENTRICULAR RATE: 65 BPM

## 2019-09-25 PROCEDURE — 93010 ELECTROCARDIOGRAM REPORT: CPT | Performed by: INTERNAL MEDICINE

## 2019-09-25 NOTE — UTILIZATION REVIEW
Notification of Discharge  This is a Notification of Discharge from our facility [address]  Please be advised that this patient has been discharge from our facility  Below you will find the admission and discharge date and time including the patients disposition  PRESENTATION DATE: 9/24/2019  2:16 AM    IP ADMISSION DATE: 9/24/19 0216   DISCHARGE DATE: 9/24/2019 12:00 PM  DISPOSITION: Non [facility]   Admission Orders listed below:  Admission Orders (From admission, onward)     Ordered        09/24/19 0238  Inpatient Admission  Once                   Please contact the UR Department if additional information is required to close this patient's authorization/case  [address]  Utilization Review Department  Phone: [phone]; Fax [phone]  [email or web address]  ATTENTION: Please call with any questions or concerns to [phone]  and carefully listen to the prompts so that you are directed to the right person  Send all requests for admission clinical reviews, approved or denied determinations and any other requests to fax [phone]   All voicemails are confidential

## 2019-09-27 LAB
ABO GROUP BLD BPU: NORMAL
ABO GROUP BLD BPU: NORMAL
BPU ID: NORMAL
BPU ID: NORMAL
CROSSMATCH: NORMAL
CROSSMATCH: NORMAL
UNIT DISPENSE STATUS: NORMAL
UNIT DISPENSE STATUS: NORMAL
UNIT PRODUCT CODE: NORMAL
UNIT PRODUCT CODE: NORMAL
UNIT RH: NORMAL
UNIT RH: NORMAL

## 2019-09-29 LAB
BACTERIA BLD CULT: NORMAL
BACTERIA BLD CULT: NORMAL

## 2019-10-01 ENCOUNTER — TRANSCRIBE ORDERS (OUTPATIENT)
Dept: ADMINISTRATIVE | Facility: HOSPITAL | Age: 49
End: 2019-10-01

## 2019-10-01 ENCOUNTER — LAB (OUTPATIENT)
Dept: LAB | Facility: HOSPITAL | Age: 49
End: 2019-10-01
Payer: COMMERCIAL

## 2019-10-01 DIAGNOSIS — Z94.0 KIDNEY REPLACED BY TRANSPLANT: Primary | ICD-10-CM

## 2019-10-01 DIAGNOSIS — Z94.0 KIDNEY REPLACED BY TRANSPLANT: ICD-10-CM

## 2019-10-01 LAB
ALBUMIN SERPL BCP-MCNC: 2 G/DL (ref 3.5–5)
ALP SERPL-CCNC: 96 U/L (ref 46–116)
ALT SERPL W P-5'-P-CCNC: 17 U/L (ref 12–78)
ANION GAP SERPL CALCULATED.3IONS-SCNC: 15 MMOL/L (ref 4–13)
AST SERPL W P-5'-P-CCNC: 44 U/L (ref 5–45)
BASOPHILS # BLD AUTO: 0.03 THOUSANDS/ΜL (ref 0–0.1)
BASOPHILS NFR BLD AUTO: 0 % (ref 0–1)
BILIRUB SERPL-MCNC: 0.2 MG/DL (ref 0.2–1)
BUN SERPL-MCNC: 116 MG/DL (ref 5–25)
CALCIUM SERPL-MCNC: 7 MG/DL (ref 8.3–10.1)
CHLORIDE SERPL-SCNC: 105 MMOL/L (ref 100–108)
CO2 SERPL-SCNC: 20 MMOL/L (ref 21–32)
CREAT SERPL-MCNC: 5.52 MG/DL (ref 0.6–1.3)
EOSINOPHIL # BLD AUTO: 0.1 THOUSAND/ΜL (ref 0–0.61)
EOSINOPHIL NFR BLD AUTO: 1 % (ref 0–6)
ERYTHROCYTE [DISTWIDTH] IN BLOOD BY AUTOMATED COUNT: 16.3 % (ref 11.6–15.1)
GFR SERPL CREATININE-BSD FRML MDRD: 11 ML/MIN/1.73SQ M
GLUCOSE P FAST SERPL-MCNC: 103 MG/DL (ref 65–99)
HCT VFR BLD AUTO: 22.1 % (ref 36.5–49.3)
HGB BLD-MCNC: 7 G/DL (ref 12–17)
IMM GRANULOCYTES # BLD AUTO: 0.09 THOUSAND/UL (ref 0–0.2)
IMM GRANULOCYTES NFR BLD AUTO: 1 % (ref 0–2)
LYMPHOCYTES # BLD AUTO: 1.04 THOUSANDS/ΜL (ref 0.6–4.47)
LYMPHOCYTES NFR BLD AUTO: 6 % (ref 14–44)
MCH RBC QN AUTO: 29.2 PG (ref 26.8–34.3)
MCHC RBC AUTO-ENTMCNC: 31.7 G/DL (ref 31.4–37.4)
MCV RBC AUTO: 92 FL (ref 82–98)
MONOCYTES # BLD AUTO: 0.44 THOUSAND/ΜL (ref 0.17–1.22)
MONOCYTES NFR BLD AUTO: 3 % (ref 4–12)
NEUTROPHILS # BLD AUTO: 14.9 THOUSANDS/ΜL (ref 1.85–7.62)
NEUTS SEG NFR BLD AUTO: 89 % (ref 43–75)
NRBC BLD AUTO-RTO: 0 /100 WBCS
PLATELET # BLD AUTO: 313 THOUSANDS/UL (ref 149–390)
PMV BLD AUTO: 8.4 FL (ref 8.9–12.7)
POTASSIUM SERPL-SCNC: 3.5 MMOL/L (ref 3.5–5.3)
PROT SERPL-MCNC: 5.4 G/DL (ref 6.4–8.2)
RBC # BLD AUTO: 2.4 MILLION/UL (ref 3.88–5.62)
SODIUM SERPL-SCNC: 140 MMOL/L (ref 136–145)
WBC # BLD AUTO: 16.6 THOUSAND/UL (ref 4.31–10.16)

## 2019-10-01 PROCEDURE — 85025 COMPLETE CBC W/AUTO DIFF WBC: CPT

## 2019-10-01 PROCEDURE — 36415 COLL VENOUS BLD VENIPUNCTURE: CPT

## 2019-10-01 PROCEDURE — 80197 ASSAY OF TACROLIMUS: CPT

## 2019-10-01 PROCEDURE — 80053 COMPREHEN METABOLIC PANEL: CPT

## 2019-10-02 LAB — TACROLIMUS BLD-MCNC: 6 NG/ML (ref 2–20)

## 2019-10-04 LAB — G-OH-BUTYR SERPL-MCNC: NEGATIVE UG/ML

## 2019-10-07 DIAGNOSIS — Z71.89 COMPLEX CARE COORDINATION: Primary | ICD-10-CM

## 2019-10-09 ENCOUNTER — PATIENT OUTREACH (OUTPATIENT)
Dept: FAMILY MEDICINE CLINIC | Facility: CLINIC | Age: 49
End: 2019-10-09

## 2019-10-09 NOTE — PROGRESS NOTES
First Telephone outreach to introduce self and out patient case management   No answer and voice mailbox not set up yet

## 2019-10-11 ENCOUNTER — HOSPITAL ENCOUNTER (INPATIENT)
Facility: HOSPITAL | Age: 49
LOS: 4 days | Discharge: HOME | DRG: 189 | End: 2019-10-15
Attending: INTERNAL MEDICINE | Admitting: INTERNAL MEDICINE
Payer: COMMERCIAL

## 2019-10-11 ENCOUNTER — APPOINTMENT (EMERGENCY)
Dept: RADIOLOGY | Facility: HOSPITAL | Age: 49
End: 2019-10-11
Payer: COMMERCIAL

## 2019-10-11 ENCOUNTER — APPOINTMENT (INPATIENT)
Dept: NON INVASIVE DIAGNOSTICS | Facility: HOSPITAL | Age: 49
DRG: 189 | End: 2019-10-11
Payer: COMMERCIAL

## 2019-10-11 ENCOUNTER — HOSPITAL ENCOUNTER (EMERGENCY)
Facility: HOSPITAL | Age: 49
End: 2019-10-11
Attending: EMERGENCY MEDICINE | Admitting: EMERGENCY MEDICINE
Payer: COMMERCIAL

## 2019-10-11 VITALS
BODY MASS INDEX: 21.38 KG/M2 | RESPIRATION RATE: 22 BRPM | HEART RATE: 88 BPM | OXYGEN SATURATION: 100 % | TEMPERATURE: 96.7 F | SYSTOLIC BLOOD PRESSURE: 179 MMHG | DIASTOLIC BLOOD PRESSURE: 87 MMHG | WEIGHT: 124.56 LBS

## 2019-10-11 DIAGNOSIS — I15.1 HYPERTENSION SECONDARY TO OTHER RENAL DISORDERS: ICD-10-CM

## 2019-10-11 DIAGNOSIS — D64.9 ANEMIA, UNSPECIFIED TYPE: ICD-10-CM

## 2019-10-11 DIAGNOSIS — N28.89 HYPERTENSION SECONDARY TO OTHER RENAL DISORDERS: ICD-10-CM

## 2019-10-11 DIAGNOSIS — N17.0 ACUTE KIDNEY INJURY (AKI) WITH ACUTE TUBULAR NECROSIS (ATN) (HCC): Primary | ICD-10-CM

## 2019-10-11 DIAGNOSIS — N18.5 STAGE 5 CHRONIC KIDNEY DISEASE NOT ON CHRONIC DIALYSIS (HCC): ICD-10-CM

## 2019-10-11 DIAGNOSIS — J96.00 ACUTE RESPIRATORY FAILURE (HCC): Primary | ICD-10-CM

## 2019-10-11 DIAGNOSIS — K92.0 GASTROINTESTINAL HEMORRHAGE WITH HEMATEMESIS: ICD-10-CM

## 2019-10-11 LAB
ABO GROUP BLD: NORMAL
ALBUMIN SERPL BCP-MCNC: 2.5 G/DL (ref 3.5–5)
ALP SERPL-CCNC: 161 U/L (ref 46–116)
ALT SERPL W P-5'-P-CCNC: 55 U/L (ref 12–78)
ANION GAP SERPL CALCULATED.3IONS-SCNC: 12 MMOL/L (ref 4–13)
APTT PPP: 31 SECONDS (ref 23–37)
AST SERPL W P-5'-P-CCNC: 115 U/L (ref 5–45)
ATRIAL RATE: 103 BPM
BACTERIA UR QL AUTO: ABNORMAL /HPF
BASE EX.OXY STD BLDV CALC-SCNC: 88.4 % (ref 60–80)
BASE EXCESS BLDV CALC-SCNC: 0.4 MMOL/L
BASOPHILS # BLD AUTO: 0.05 THOUSANDS/ΜL (ref 0–0.1)
BASOPHILS NFR BLD AUTO: 0 % (ref 0–1)
BILIRUB SERPL-MCNC: 0.6 MG/DL (ref 0.2–1)
BILIRUB UR QL STRIP: NEGATIVE
BLD GP AB SCN SERPL QL: NEGATIVE
BUN SERPL-MCNC: 93 MG/DL (ref 5–25)
CALCIUM SERPL-MCNC: 8 MG/DL (ref 8.3–10.1)
CHLORIDE SERPL-SCNC: 102 MMOL/L (ref 100–108)
CLARITY UR: CLEAR
CO2 SERPL-SCNC: 27 MMOL/L (ref 21–32)
COLOR UR: YELLOW
CREAT SERPL-MCNC: 4.76 MG/DL (ref 0.6–1.3)
EOSINOPHIL # BLD AUTO: 0.26 THOUSAND/ΜL (ref 0–0.61)
EOSINOPHIL NFR BLD AUTO: 2 % (ref 0–6)
ERYTHROCYTE [DISTWIDTH] IN BLOOD BY AUTOMATED COUNT: 15.9 % (ref 11.6–15.1)
EST. AVERAGE GLUCOSE BLD GHB EST-MCNC: 91 MG/DL
GFR SERPL CREATININE-BSD FRML MDRD: 13 ML/MIN/1.73SQ M
GLUCOSE SERPL-MCNC: 187 MG/DL (ref 65–140)
GLUCOSE SERPL-MCNC: 267 MG/DL (ref 65–140)
GLUCOSE SERPL-MCNC: 358 MG/DL (ref 65–140)
GLUCOSE UR STRIP-MCNC: ABNORMAL MG/DL
HBA1C MFR BLD: 4.8 % (ref 4.2–6.3)
HCO3 BLDV-SCNC: 26.3 MMOL/L (ref 24–30)
HCT VFR BLD AUTO: 26 % (ref 36.5–49.3)
HGB BLD-MCNC: 8.1 G/DL (ref 12–17)
HGB UR QL STRIP.AUTO: ABNORMAL
IMM GRANULOCYTES # BLD AUTO: 0.11 THOUSAND/UL (ref 0–0.2)
IMM GRANULOCYTES NFR BLD AUTO: 1 % (ref 0–2)
INR PPP: 0.99 (ref 0.84–1.19)
KETONES UR STRIP-MCNC: NEGATIVE MG/DL
LACTATE SERPL-SCNC: 2.1 MMOL/L (ref 0.5–2)
LEUKOCYTE ESTERASE UR QL STRIP: NEGATIVE
LYMPHOCYTES # BLD AUTO: 3.6 THOUSANDS/ΜL (ref 0.6–4.47)
LYMPHOCYTES NFR BLD AUTO: 25 % (ref 14–44)
MAGNESIUM SERPL-MCNC: 2.2 MG/DL (ref 1.6–2.6)
MCH RBC QN AUTO: 30 PG (ref 26.8–34.3)
MCHC RBC AUTO-ENTMCNC: 31.2 G/DL (ref 31.4–37.4)
MCV RBC AUTO: 96 FL (ref 82–98)
MONOCYTES # BLD AUTO: 0.77 THOUSAND/ΜL (ref 0.17–1.22)
MONOCYTES NFR BLD AUTO: 5 % (ref 4–12)
NEUTROPHILS # BLD AUTO: 9.82 THOUSANDS/ΜL (ref 1.85–7.62)
NEUTS SEG NFR BLD AUTO: 67 % (ref 43–75)
NITRITE UR QL STRIP: NEGATIVE
NON-SQ EPI CELLS URNS QL MICRO: ABNORMAL /HPF
NRBC BLD AUTO-RTO: 0 /100 WBCS
NT-PROBNP SERPL-MCNC: ABNORMAL PG/ML
O2 CT BLDV-SCNC: 11.1 ML/DL
P AXIS: 80 DEGREES
PCO2 BLDV: 48.8 MM HG (ref 42–50)
PH BLDV: 7.35 [PH] (ref 7.3–7.4)
PH UR STRIP.AUTO: 7.5 [PH]
PLATELET # BLD AUTO: 436 THOUSANDS/UL (ref 149–390)
PMV BLD AUTO: 8.5 FL (ref 8.9–12.7)
PO2 BLDV: 66.2 MM HG (ref 35–45)
POTASSIUM SERPL-SCNC: 5.2 MMOL/L (ref 3.5–5.3)
PR INTERVAL: 130 MS
PROT SERPL-MCNC: 6.7 G/DL (ref 6.4–8.2)
PROT UR STRIP-MCNC: >=300 MG/DL
PROTHROMBIN TIME: 13.1 SECONDS (ref 11.6–14.5)
QRS AXIS: 79 DEGREES
QRSD INTERVAL: 86 MS
QT INTERVAL: 376 MS
QTC INTERVAL: 492 MS
RBC # BLD AUTO: 2.7 MILLION/UL (ref 3.88–5.62)
RBC #/AREA URNS AUTO: ABNORMAL /HPF
RH BLD: POSITIVE
SODIUM SERPL-SCNC: 141 MMOL/L (ref 136–145)
SP GR UR STRIP.AUTO: 1.02 (ref 1–1.03)
SPECIMEN EXPIRATION DATE: NORMAL
T WAVE AXIS: 91 DEGREES
TROPONIN I SERPL-MCNC: 0.06 NG/ML
UROBILINOGEN UR QL STRIP.AUTO: 0.2 E.U./DL
VENTRICULAR RATE: 103 BPM
WBC # BLD AUTO: 14.61 THOUSAND/UL (ref 4.31–10.16)
WBC #/AREA URNS AUTO: ABNORMAL /HPF

## 2019-10-11 PROCEDURE — 99223 1ST HOSP IP/OBS HIGH 75: CPT | Performed by: INTERNAL MEDICINE

## 2019-10-11 PROCEDURE — 82948 REAGENT STRIP/BLOOD GLUCOSE: CPT

## 2019-10-11 PROCEDURE — 83605 ASSAY OF LACTIC ACID: CPT | Performed by: EMERGENCY MEDICINE

## 2019-10-11 PROCEDURE — 99285 EMERGENCY DEPT VISIT HI MDM: CPT

## 2019-10-11 PROCEDURE — 93010 ELECTROCARDIOGRAM REPORT: CPT | Performed by: INTERNAL MEDICINE

## 2019-10-11 PROCEDURE — 86900 BLOOD TYPING SEROLOGIC ABO: CPT | Performed by: EMERGENCY MEDICINE

## 2019-10-11 PROCEDURE — 94760 N-INVAS EAR/PLS OXIMETRY 1: CPT

## 2019-10-11 PROCEDURE — 94660 CPAP INITIATION&MGMT: CPT

## 2019-10-11 PROCEDURE — 84484 ASSAY OF TROPONIN QUANT: CPT | Performed by: EMERGENCY MEDICINE

## 2019-10-11 PROCEDURE — 82805 BLOOD GASES W/O2 SATURATION: CPT | Performed by: EMERGENCY MEDICINE

## 2019-10-11 PROCEDURE — 83036 HEMOGLOBIN GLYCOSYLATED A1C: CPT | Performed by: NURSE PRACTITIONER

## 2019-10-11 PROCEDURE — 81001 URINALYSIS AUTO W/SCOPE: CPT | Performed by: EMERGENCY MEDICINE

## 2019-10-11 PROCEDURE — 85730 THROMBOPLASTIN TIME PARTIAL: CPT | Performed by: EMERGENCY MEDICINE

## 2019-10-11 PROCEDURE — 85610 PROTHROMBIN TIME: CPT | Performed by: EMERGENCY MEDICINE

## 2019-10-11 PROCEDURE — 86901 BLOOD TYPING SEROLOGIC RH(D): CPT | Performed by: EMERGENCY MEDICINE

## 2019-10-11 PROCEDURE — 96365 THER/PROPH/DIAG IV INF INIT: CPT

## 2019-10-11 PROCEDURE — 87040 BLOOD CULTURE FOR BACTERIA: CPT | Performed by: EMERGENCY MEDICINE

## 2019-10-11 PROCEDURE — 80197 ASSAY OF TACROLIMUS: CPT | Performed by: NURSE PRACTITIONER

## 2019-10-11 PROCEDURE — 93005 ELECTROCARDIOGRAM TRACING: CPT

## 2019-10-11 PROCEDURE — 86850 RBC ANTIBODY SCREEN: CPT | Performed by: EMERGENCY MEDICINE

## 2019-10-11 PROCEDURE — 83735 ASSAY OF MAGNESIUM: CPT | Performed by: EMERGENCY MEDICINE

## 2019-10-11 PROCEDURE — 96375 TX/PRO/DX INJ NEW DRUG ADDON: CPT

## 2019-10-11 PROCEDURE — 96366 THER/PROPH/DIAG IV INF ADDON: CPT

## 2019-10-11 PROCEDURE — 85025 COMPLETE CBC W/AUTO DIFF WBC: CPT | Performed by: EMERGENCY MEDICINE

## 2019-10-11 PROCEDURE — 71045 X-RAY EXAM CHEST 1 VIEW: CPT

## 2019-10-11 PROCEDURE — 83880 ASSAY OF NATRIURETIC PEPTIDE: CPT | Performed by: EMERGENCY MEDICINE

## 2019-10-11 PROCEDURE — 93306 TTE W/DOPPLER COMPLETE: CPT

## 2019-10-11 PROCEDURE — C9113 INJ PANTOPRAZOLE SODIUM, VIA: HCPCS | Performed by: NURSE PRACTITIONER

## 2019-10-11 PROCEDURE — 80053 COMPREHEN METABOLIC PANEL: CPT | Performed by: EMERGENCY MEDICINE

## 2019-10-11 PROCEDURE — 99291 CRITICAL CARE FIRST HOUR: CPT | Performed by: EMERGENCY MEDICINE

## 2019-10-11 PROCEDURE — 94644 CONT INHLJ TX 1ST HOUR: CPT

## 2019-10-11 PROCEDURE — 36415 COLL VENOUS BLD VENIPUNCTURE: CPT | Performed by: EMERGENCY MEDICINE

## 2019-10-11 RX ORDER — NITROGLYCERIN 20 MG/100ML
5-200 INJECTION INTRAVENOUS
Status: DISCONTINUED | OUTPATIENT
Start: 2019-10-11 | End: 2019-10-11 | Stop reason: HOSPADM

## 2019-10-11 RX ORDER — METOPROLOL SUCCINATE 50 MG/1
50 TABLET, EXTENDED RELEASE ORAL DAILY
Status: DISCONTINUED | OUTPATIENT
Start: 2019-10-12 | End: 2019-10-15 | Stop reason: HOSPADM

## 2019-10-11 RX ORDER — METHYLPREDNISOLONE SODIUM SUCCINATE 125 MG/2ML
125 INJECTION, POWDER, LYOPHILIZED, FOR SOLUTION INTRAMUSCULAR; INTRAVENOUS ONCE
Status: COMPLETED | OUTPATIENT
Start: 2019-10-11 | End: 2019-10-11

## 2019-10-11 RX ORDER — CALCITRIOL 0.25 UG/1
1 CAPSULE, LIQUID FILLED ORAL
Status: DISCONTINUED | OUTPATIENT
Start: 2019-10-11 | End: 2019-10-12

## 2019-10-11 RX ORDER — FUROSEMIDE 10 MG/ML
40 INJECTION INTRAMUSCULAR; INTRAVENOUS
Status: DISCONTINUED | OUTPATIENT
Start: 2019-10-11 | End: 2019-10-12

## 2019-10-11 RX ORDER — ATORVASTATIN CALCIUM 80 MG/1
80 TABLET, FILM COATED ORAL
Status: DISCONTINUED | OUTPATIENT
Start: 2019-10-11 | End: 2019-10-15 | Stop reason: HOSPADM

## 2019-10-11 RX ORDER — TACROLIMUS 1 MG/1
3 CAPSULE ORAL DAILY
Status: DISCONTINUED | OUTPATIENT
Start: 2019-10-11 | End: 2019-10-15 | Stop reason: HOSPADM

## 2019-10-11 RX ORDER — AZATHIOPRINE 50 MG/1
50 TABLET ORAL DAILY
Status: DISCONTINUED | OUTPATIENT
Start: 2019-10-11 | End: 2019-10-15 | Stop reason: HOSPADM

## 2019-10-11 RX ORDER — NIFEDIPINE 30 MG/1
30 TABLET, EXTENDED RELEASE ORAL DAILY
Status: DISCONTINUED | OUTPATIENT
Start: 2019-10-12 | End: 2019-10-12

## 2019-10-11 RX ORDER — FUROSEMIDE 10 MG/ML
40 INJECTION INTRAMUSCULAR; INTRAVENOUS
Status: DISCONTINUED | OUTPATIENT
Start: 2019-10-11 | End: 2019-10-11

## 2019-10-11 RX ORDER — TACROLIMUS 1 MG/1
2 CAPSULE ORAL EVERY EVENING
Status: DISCONTINUED | OUTPATIENT
Start: 2019-10-11 | End: 2019-10-12

## 2019-10-11 RX ORDER — PANTOPRAZOLE SODIUM 40 MG/1
40 INJECTION, POWDER, FOR SOLUTION INTRAVENOUS
Status: DISCONTINUED | OUTPATIENT
Start: 2019-10-11 | End: 2019-10-12

## 2019-10-11 RX ORDER — HEPARIN SODIUM 5000 [USP'U]/ML
5000 INJECTION, SOLUTION INTRAVENOUS; SUBCUTANEOUS EVERY 8 HOURS SCHEDULED
Status: DISCONTINUED | OUTPATIENT
Start: 2019-10-11 | End: 2019-10-15 | Stop reason: HOSPADM

## 2019-10-11 RX ORDER — HYDRALAZINE HYDROCHLORIDE 20 MG/ML
10 INJECTION INTRAMUSCULAR; INTRAVENOUS EVERY 6 HOURS PRN
Status: DISCONTINUED | OUTPATIENT
Start: 2019-10-11 | End: 2019-10-13

## 2019-10-11 RX ORDER — METOPROLOL SUCCINATE 50 MG/1
50 TABLET, EXTENDED RELEASE ORAL DAILY
Status: DISCONTINUED | OUTPATIENT
Start: 2019-10-12 | End: 2019-10-11

## 2019-10-11 RX ORDER — SODIUM CHLORIDE FOR INHALATION 0.9 %
12 VIAL, NEBULIZER (ML) INHALATION ONCE
Status: COMPLETED | OUTPATIENT
Start: 2019-10-11 | End: 2019-10-11

## 2019-10-11 RX ORDER — PREDNISONE 1 MG/1
5 TABLET ORAL DAILY
Status: DISCONTINUED | OUTPATIENT
Start: 2019-10-11 | End: 2019-10-15 | Stop reason: HOSPADM

## 2019-10-11 RX ORDER — METOPROLOL SUCCINATE 50 MG/1
50 TABLET, EXTENDED RELEASE ORAL DAILY
Status: DISCONTINUED | OUTPATIENT
Start: 2019-10-11 | End: 2019-10-11

## 2019-10-11 RX ORDER — LEVALBUTEROL 1.25 MG/.5ML
1.25 SOLUTION, CONCENTRATE RESPIRATORY (INHALATION) EVERY 8 HOURS PRN
Status: DISCONTINUED | OUTPATIENT
Start: 2019-10-11 | End: 2019-10-15 | Stop reason: HOSPADM

## 2019-10-11 RX ORDER — FUROSEMIDE 10 MG/ML
40 INJECTION INTRAMUSCULAR; INTRAVENOUS ONCE
Status: COMPLETED | OUTPATIENT
Start: 2019-10-11 | End: 2019-10-11

## 2019-10-11 RX ADMIN — METOPROLOL SUCCINATE 50 MG: 50 TABLET, EXTENDED RELEASE ORAL at 13:02

## 2019-10-11 RX ADMIN — PANTOPRAZOLE SODIUM 40 MG: 40 INJECTION, POWDER, FOR SOLUTION INTRAVENOUS at 14:20

## 2019-10-11 RX ADMIN — METHYLPREDNISOLONE SODIUM SUCCINATE 125 MG: 125 INJECTION, POWDER, FOR SOLUTION INTRAMUSCULAR; INTRAVENOUS at 03:56

## 2019-10-11 RX ADMIN — TACROLIMUS 2 MG: 1 CAPSULE ORAL at 18:09

## 2019-10-11 RX ADMIN — FUROSEMIDE 40 MG: 10 INJECTION, SOLUTION INTRAMUSCULAR; INTRAVENOUS at 18:08

## 2019-10-11 RX ADMIN — FUROSEMIDE 40 MG: 10 INJECTION, SOLUTION INTRAMUSCULAR; INTRAVENOUS at 03:57

## 2019-10-11 RX ADMIN — INSULIN LISPRO 10 UNITS: 100 INJECTION, SOLUTION INTRAVENOUS; SUBCUTANEOUS at 16:21

## 2019-10-11 RX ADMIN — HYDRALAZINE HYDROCHLORIDE 10 MG: 20 INJECTION INTRAMUSCULAR; INTRAVENOUS at 23:01

## 2019-10-11 RX ADMIN — IPRATROPIUM BROMIDE 1 MG: 0.5 SOLUTION RESPIRATORY (INHALATION) at 03:26

## 2019-10-11 RX ADMIN — HEPARIN SODIUM 5000 UNITS: 5000 INJECTION INTRAVENOUS; SUBCUTANEOUS at 22:36

## 2019-10-11 RX ADMIN — ISODIUM CHLORIDE 12 ML: 0.03 SOLUTION RESPIRATORY (INHALATION) at 03:26

## 2019-10-11 RX ADMIN — CALCITRIOL CAPSULES 0.25 MCG 1 MCG: 0.25 CAPSULE ORAL at 13:04

## 2019-10-11 RX ADMIN — TACROLIMUS 3 MG: 1 CAPSULE ORAL at 13:04

## 2019-10-11 RX ADMIN — ATORVASTATIN CALCIUM 80 MG: 80 TABLET, FILM COATED ORAL at 18:08

## 2019-10-11 RX ADMIN — AZATHIOPRINE 50 MG: 50 TABLET ORAL at 13:04

## 2019-10-11 RX ADMIN — ALBUTEROL SULFATE 10 MG: 2.5 SOLUTION RESPIRATORY (INHALATION) at 03:26

## 2019-10-11 RX ADMIN — HYDRALAZINE HYDROCHLORIDE 10 MG: 20 INJECTION INTRAMUSCULAR; INTRAVENOUS at 16:21

## 2019-10-11 RX ADMIN — PREDNISONE 5 MG: 2.5 TABLET ORAL at 13:02

## 2019-10-11 RX ADMIN — INSULIN LISPRO 6 UNITS: 100 INJECTION, SOLUTION INTRAVENOUS; SUBCUTANEOUS at 13:07

## 2019-10-11 RX ADMIN — HEPARIN SODIUM 5000 UNITS: 5000 INJECTION INTRAVENOUS; SUBCUTANEOUS at 14:21

## 2019-10-11 RX ADMIN — INSULIN LISPRO 12 UNITS: 100 INJECTION, SOLUTION INTRAVENOUS; SUBCUTANEOUS at 22:17

## 2019-10-11 RX ADMIN — INSULIN DETEMIR 7 UNITS: 100 INJECTION, SOLUTION SUBCUTANEOUS at 23:12

## 2019-10-11 RX ADMIN — NITROGLYCERIN 5 MCG/MIN: 20 INJECTION INTRAVENOUS at 03:58

## 2019-10-11 NOTE — H&P
History & Physical Exam - Critical Care   Oris Cornea 51 y o  male MRN: [id number]  Unit/Bed#: ICU 13 Encounter: [id number]      Assessment/Plan:  1  Flash pulmonary edema secondary to hypertension  · Patient was initially treated with diuretics, nitroglycerin, and steroids  · Continue to treat hypertension  · Diuretics per Nephrology  2  Acute kidney in the setting of kidney transplant  · Consult Nephrology  · Monitor kidney indices  · May require hemodialysis  · Creatinine close to baseline of 4 5  · Strict I&O monitoring  · Continue anti-rejection drugs  3  Anasarca  · Diuretics per Nephrology  · No history of congestive heart failure, check echocardiogram  4  Type 1 diabetes  · Sliding scale insulin   · Monitor blood glucose  · Controlled carbohydrate diet  · Resume home insulin regimen at discharge cut  5  History of GI bleed ; Protonix daily  6  Status post right AKA in February of this year      Critical Care Time:  minutes  Documented critical care time excludes any procedures documented elsewhere  It also excludes any family updates    _____________________________________________________________________      HPI:    Oris Cornea is a 51 y o  male who was transported to [facility] by EMS secondary to respiratory distress  According to EMS report the patient was at home became progressively short of breath  He did take his medications hoping to improve  However, his wife insisted that he activated EMS when he did not improve immediately  Upon arrival to the emergency department at [facility] patient was noted to be significantly short of breath  He was immediately treated with BiPAP, updraft nebulizers, nitroglycerin IV, steroids, and diuretics    Given the patient's recent history requiring life flighted to [facility], and his significant respiratory distress, the patient was transported to [address] [address] Intensive Care Unit for higher level of care  Upon [place] [facility] [place], the patient is improved  He feels much better, but is still mildly short of breath  He denies headache, upper respiratory symptoms, chest pain, abdominal pain, difficulty with urination or defecation  He does complain of left lower extremity edema  He has never smoked, does not drink alcohol  He works as a   He will require greater than 2 midnight stay  Review of Systems:    Full 12 point review of systems was performed  Aside from what was mentioned in the HPI, it is otherwise negative  Historical Information   Past Medical History:   Diagnosis Date    Bacteremia 12/21/2018    [facility]     Diabetes mellitus [facility]     Diabetes mellitus type 1 ([facility]     GI bleed     Hypertension     Infection at site of external fixator pin ([facility]     [facility]     Pneumonia     Last Assessed [id number]    Renal failure     Renal transplant, status post 07/21/2007     Past Surgical History:   Procedure Laterality Date    ANKLE FRACTURE SURGERY      ANKLE HARDWARE REMOVAL Right 7/31/2017    Procedure: REMOVAL HARDWARE ANKLE;  Surgeon: Cheyenne Covarrubias MD;  [facility];  Service: Orthopedics    ANKLE HARDWARE REMOVAL Right 8/17/2017    Procedure: TIBIA FAILED HARDWARE REMOVAL;  Surgeon: Cheyenne Covarrubias MD;  [facility];  Service: Orthopedics    CARDIAC SURGERY      2 stents    CLOSED REDUCTION ANKLE Right 7/3/2017    Procedure: CLOSED REDUCTION DISTAL TIB-FIB AND CASTING VS;  Surgeon: Cheyenne Covarrubias MD;  [facility];  Service: Orthopedics    ESOPHAGOGASTRODUODENOSCOPY N/A 12/20/2018    Procedure: ESOPHAGOGASTRODUODENOSCOPY (EGD) in ICU; Surgeon: Yamilet Crespo MD;  Location: [facility];   Service: Gastroenterology    EYE SURGERY      FRACTURE SURGERY      ORIF Rt Ankle    GLUTAMIC ACID DECARBOXYLASE (HISTORICAL)      IR PICC LINE  8/20/2018    IR VENOUS LINE REMOVAL  10/5/2018    LEG AMPUTATION Right 02/01/2019    Above the knee    NEPHRECTOMY TRANSPLANTED ORGAN      AK OPEN TREATMENT FRACTURE DISTAL TIBIA FIBULA Right 7/3/2017    Procedure: OPEN REDUCTION W/ INTERNAL FIXATION (ORIF); Surgeon: Oneida Valenzuela MD;  [facility]: [facility];  Service: Orthopedics    TOE AMPUTATION Right 10/27/2016    Procedure: AMPUTATION TOE;  Surgeon: Dina Edgar DPM;  [facility]: [facility];  Service:      Social History   Social History     Substance and Sexual Activity   Alcohol Use Not Currently     Social History     Substance and Sexual Activity   Drug Use Never     Social History     Tobacco Use   Smoking Status Never Smoker   Smokeless Tobacco Never Used       Family History:   Family History   Problem Relation Age of Onset    Diabetes Brother     Coronary artery disease Mother        Medications:  Pertinent medications were reviewed    Current Facility-Administered Medications:  atorvastatin 80 mg Oral After Dimple Tiffani, CRNP   azaTHIOprine 50 mg Oral Daily Edyth Kostas, CRNP   calcitriol 1 mcg Oral Once per day on Mon Wed Fri Edyth Kostas, CRNP   insulin lispro 2-12 Units Subcutaneous TID With Meals Edyth Kostas, CRNP   metoprolol succinate 50 mg Oral Daily Edyth Kostas, CRNP   predniSONE 5 mg Oral Daily Edyth Kostas, CRNP   tacrolimus 2 mg Oral QPM Edyth Kostas, CRNP   tacrolimus 3 mg Oral Daily Edyth Kostas, CRNP         No Known Allergies      Vitals:   BP (!) 172/82   Pulse 90   Temp 99 °F (37 2 °C) (Temporal)   Resp 22   SpO2 97%   There is no height or weight on file to calculate BMI    SpO2: 97 %,    ,        No intake or output data in the 24 hours ending 10/11/19 1218  Invasive Devices     Peripheral Intravenous Line            Peripheral IV 10/11/19 Left Forearm less than 1 day                Physical Exam:  Gen:  Pleasant and cooperative  HEENT:  Atraumatic normocephalic pupils equal round reactive light extraocular muscles intact laboratory:  Mucosa is pink and moist  Neck:  Supple no JVD no lymphadenopathy trach is midline  Chest:  Breath sounds diminished throughout the left side and the right base, respirations were even and nonlabored  O2 saturation in the mid 90s on 4 L via nasal cannula  Cor:  Regular rate and rhythm no murmurs rubs or gallops appreciated  Abd:  Soft nontender with positive bowel sounds  Ext:  Right AKA, pitting edema of the left lower extremity  Neuro:  Alert and oriented x3 moves all extremities  Skin:  Warm dry and intact      Diagnostic Data:  Lab: I have personally reviewed pertinent lab results  ,   CBC:  Results from last 7 days   Lab Units 10/11/19  0346   WBC Thousand/uL 14 61*   HEMOGLOBIN g/dL 8 1*   HEMATOCRIT % 26 0*   PLATELETS Thousands/uL 436*      CMP: Lab Results   Component Value Date    SODIUM 141 10/11/2019    K 5 2 10/11/2019     10/11/2019    CO2 27 10/11/2019    BUN 93 (H) 10/11/2019    CREATININE 4 76 (H) 10/11/2019    CALCIUM 8 0 (L) 10/11/2019     (H) 10/11/2019    ALT 55 10/11/2019    ALKPHOS 161 (H) 10/11/2019    EGFR 13 10/11/2019   ,   PT/INR:   Lab Results   Component Value Date    INR 0 99 10/11/2019   ,   Magnesium: No components found for: MAG,  Phosphorous: No results found for: PHOS    ABG: No results found for: PHART, FON8ODC, PO2ART, PUU5ZAM, C0UQOIWR, BEART, SOURCE,     Microbiology:  Blood culture pending    Imaging: I have personally reviewed the pertinent imaging studies on the PACS system  Chest x-ray done 11 October 2015 : Interval  removal  of  left-sided  central  venous  catheter,  endotracheal,  and  enteric  tubes  The  cardiac  silhouette  is  enlarged  but  stable  in  size   Redemonstration  of  aortic  arch  calcification      Increased  opacity  overlying  the  right  lower  lung  field  compatible  with  pneumonia  in  the  appropriate  clinical  setting     There  is  pulmonary  vascular  congestion   No  pneumothorax  or  pleural  effusion  Osseous  structures  appear  within  normal  limits  for  patient  age       Cardiac/EKG/telemetry/Echo:   Results from last 7 days   Lab Units 10/11/19  0346   TROPONIN I ng/mL 0 06*   NT-PRO BNP pg/mL 78,897*     Normal sinus rhythm      VTE Prophylaxis: Heparin    Code Status: Prior    Concha Romberg, CRNP    Portions of the record may have been created with voice recognition software  Occasional wrong word or "sound a like" substitutions may have occurred due to the inherent limitations of voice recognition software  Read the chart carefully and recognize, using context, where substitutions have occurred

## 2019-10-11 NOTE — RESPIRATORY THERAPY NOTE
Pt  Karyle Mulder in to ER in severe respiratory distress on CPAP unit  Placed pt  On our BiPAP and put a continuous heart neb in line  Will continue to monitor pt

## 2019-10-11 NOTE — EMTALA/ACUTE CARE TRANSFER
[facility] EMERGENCY DEPARTMENT  [address] [facility] [address]  Dept: [phone]      EMTALA TRANSFER CONSENT    NAME Ed SPENCE 1970                              MRN [id number]    I have been informed of my rights regarding examination, treatment, and transfer   by Dr Karen Cates MD    Benefits: (Critical Care)    Risks: Potential for delay in receiving treatment, Loss of IV, Potential deterioration of medical condition, Increased discomfort during transfer, Possible worsening of condition or death during transfer      Consent for Transfer:  I acknowledge that my medical condition has been evaluated and explained to me by the emergency department physician or other qualified medical person and/or my attending physician, who has recommended that I be transferred to the service of  Accepting Physician: Dr Carlos Delgadillo at [address] [place] [address] Name, Elder Carter : [facility]  The above potential benefits of such transfer, the potential risks associated with such transfer, and the probable risks of not being transferred have been explained to me, and I fully understand them  The doctor has explained that, in my case, the benefits of transfer outweigh the risks  I agree to be transferred  I authorize the performance of emergency medical procedures and treatments upon me in both transit and upon arrival at the receiving facility  Additionally, I authorize the release of any and all medical records to the receiving facility and request they be transported with me, if possible  I understand that the safest mode of transportation during a medical emergency is an ambulance and that the Hospital advocates the use of this mode of transport   Risks of traveling to the receiving facility by car, including absence of medical control, life sustaining equipment, such as oxygen, and medical personnel has been explained to me and I fully understand them  (LARISSA CORRECT BOX BELOW)  [  ]  I consent to the stated transfer and to be transported by ambulance/helicopter  [  ]  I consent to the stated transfer, but refuse transportation by ambulance and accept full responsibility for my transportation by car  I understand the risks of non-ambulance transfers and I exonerate the Hospital and its staff from any deterioration in my condition that results from this refusal     X___________________________________________    DATE  10/11/19  TIME________  Signature of patient or legally responsible individual signing on patient behalf           RELATIONSHIP TO PATIENT_________________________          Provider Certification    NAME Shima Fuentes                                         1970                              MRN [id number]    A medical screening exam was performed on the above named patient  Based on the examination:    Condition Necessitating Transfer The encounter diagnosis was Acute respiratory failure ([address] )  Patient Condition: The patient has been stabilized such that within reasonable medical probability, no material deterioration of the patient condition or the condition of the unborn child(tiesha) is likely to result from the transfer    Reason for Transfer: Level of Care needed not available at this facility    Transfer Requirements: [id number] [facility] [[email or web address] [address]   · Space available and qualified personnel available for treatment as acknowledged by [facility]  · Agreed to accept transfer and to provide appropriate medical treatment as acknowledged by       Dr Leana Valdez  · Appropriate medical records of the examination and treatment of the patient are provided at the time of transfer   [address], [address] _______  · Transfer will be performed by qualified personnel from    and appropriate transfer equipment as required, including the use of necessary and appropriate life support measures      Provider Certification: I have examined the patient and explained the following risks and benefits of being transferred/refusing transfer to the patient/family:  General risk, such as traffic hazards, adverse weather conditions, rough terrain or turbulence, possible failure of equipment (including vehicle or aircraft), or consequences of actions of persons outside the control of the transport personnel, Unanticipated needs of medical equipment and personnel during transport, Risk of worsening condition      Based on these reasonable risks and benefits to the patient and/or the unborn child(tiesha), and based upon the information available at the time of the patients examination, I certify that the medical benefits reasonably to be expected from the provision of appropriate medical treatments at another medical facility outweigh the increasing risks, if any, to the individuals medical condition, and in the case of labor to the unborn child, from effecting the transfer      X____________________________________________ DATE 10/11/19        TIME_______      ORIGINAL - SEND TO MEDICAL RECORDS   COPY - SEND WITH PATIENT DURING TRANSFER

## 2019-10-11 NOTE — CONSULTS
Consultation - Nephrology   Leidymatt Donovan 51 y o  male MRN: [id number]  Unit/Bed#: ICU 15 Encounter: [id number]      ASSESSMENT    Pleasant 14-year-old male transferred from [facility] for respiratory distress has a history of living related donor transplant in 2001 has a previous transplant as well, type 1 diabetes mellitus and stage 5 CKD with a baseline creatinine of 4 5 who presented with hypertensive urgency and flash pulmonary edema    1  Acute kidney injury with a history of kidney transplant  -1 year ago his creatinine was around 2 5-3, will last month he presented with a creatinine in the 6-7 rate he was transferred down to pad he did not require dialysis and his creatinine stabilized around 4 5  -she is interested and all modalities if dialysis is needed  -is a history of a living donor transplant from 2001  -follows with [facility] for transplant and Nephrology  2  Stage 5 chronic kidney disease not yet on dialysis  -his baseline creatinine is around 4 5  3  Pulmonary edema  -flash pulmonary edema in the setting of persistent hypertension  -improved respiratory status  4  Type 1 diabetes mellitus  -currently on insulin  5  History of GI bleed  -on PPI  6  Immunosuppressed  -currently on azathioprine 50 mg daily  -tacrolimus 3 mg a m  And 2 mg p m  7  Hypertension  -metoprolol XL 50 mg daily  -nitroglycerin drip  -diuresis    IMPRESSION & PLAN     -he has had 2 recent hospitalization at now has a GFR that is less than 15   He has no asterixis her mild clonus his potassium is 15 in his bicarb was 27 he is still making urine there is no urgent need for renal replacement therapy       -In the past he has stated he would not want dialysis but this has now changed his with his wife at his bedside who states that he would want all modalities potentially home hemodialysis if needed had if he needs dialysis in the hospital he would be okay with that as well    -he seems to have responded to furosemide 40 mg IV with about 350 cc of urine so far may  Would increase furosemide to 40 mg IV t i d -targeted net negative fluid balance of between 1-2 L by tomorrow morning the patient is not meeting a net negative fluid balance than up titrate dose to 80 mg IV t i d  Or potentially start a Lasix drip    -he does not have any vascular access at this point does not require dialysis but given his poor renal reserve this will need to be monitored closely    -continue immunosuppression which consists of azathioprine and tacrolimus with doses above a tacrolimus levels pending    -continue blood pressure control with metoprolol patient receive nitroprusside and diuresis    -check a renal ultrasound with renal artery Doppler of transplanted kidney, urinalysis shows greater than 300 protein specific gravity of 10 20 small blood, check a urine protein creatinine ratio    -will make further recommendations throughout his hospitalization for now he has clinically improved since his presentation from [facility] we will monitor closely    HISTORY OF PRESENT ILLNESS:  Requesting Physician: Maribel Larry DO  Reason for Consult:  Stage 5 chronic kidney disease    Leidy Donovan is a 51y o  year old male who was admitted to [facility] after presenting with shortness of breath  A renal consultation is requested today for assistance in the management of acute kidney injury on chronic kidney disease      He has a living related donor transplant in [address] [place] [address] than and 2001 she has had 3 hospitalizations in [facility] system he had DKA 2018 most recently presented with acute kidney injury and DKA as well as well as coffee-ground emesis he was transferred to [address] during that hospitalization his creatinine was around 6 5 but then did improved to 4 5 he did not require dialysis he was discharged from there and was home his blood pressures were elevated and he became short of breath and came to the hospital was lb to have pulmonary vascular congestion and because of his respiratory issues transfer to [facility] test he currently is feeling much better he received 40 mg of IV Lasix and some steroids in the hospital is an elevated BMP and a creatinine that is slightly above his baseline he still is making urine without any difficulty    PAST MEDICAL HISTORY:  Past Medical History:   Diagnosis Date    Bacteremia 12/21/2018    [facility]     Diabetes mellitus [facility]     Diabetes mellitus type 1 [facility]     GI bleed     Hypertension     Infection at site of external fixator pin ([facility]     [facility]     Pneumonia     Last Assessed [id number]    Renal failure     Renal transplant, status post 07/21/2007       PAST SURGICAL HISTORY:  Past Surgical History:   Procedure Laterality Date    ANKLE FRACTURE SURGERY      ANKLE HARDWARE REMOVAL Right 7/31/2017    Procedure: REMOVAL HARDWARE ANKLE;  Surgeon: Faith Montiel MD;  [facility];  Service: Orthopedics    ANKLE HARDWARE REMOVAL Right 8/17/2017    Procedure: TIBIA FAILED HARDWARE REMOVAL;  Surgeon: Faith Montiel MD;  [facility];  Service: Orthopedics    CARDIAC SURGERY      2 stents    CLOSED REDUCTION ANKLE Right 7/3/2017    Procedure: CLOSED REDUCTION DISTAL TIB-FIB AND CASTING VS;  Surgeon: Faith Montiel MD;  [facility]: [facility];  Service: Orthopedics    ESOPHAGOGASTRODUODENOSCOPY N/A 12/20/2018    Procedure: ESOPHAGOGASTRODUODENOSCOPY (EGD) in ICU; Surgeon: Collin Waters MD;  [facility]: [facility]; Service: Gastroenterology    EYE SURGERY      FRACTURE SURGERY      ORIF Rt Ankle    GLUTAMIC ACID DECARBOXYLASE (HISTORICAL)      IR PICC LINE  8/20/2018    IR VENOUS LINE REMOVAL  10/5/2018    LEG AMPUTATION Right 02/01/2019    Above the knee    NEPHRECTOMY TRANSPLANTED ORGAN      [place] OPEN TREATMENT FRACTURE DISTAL TIBIA FIBULA Right 7/3/2017    Procedure: OPEN REDUCTION W/ INTERNAL FIXATION (ORIF);   Surgeon: Faith Montiel MD; Location: [facility];  Service: Orthopedics    TOE AMPUTATION Right 10/27/2016    Procedure: AMPUTATION TOE;  Surgeon: Joy Kaufman DPM;  [facility];  Service:        ALLERGIES:  No Known Allergies    SOCIAL HISTORY:  Social History     Substance and Sexual Activity   Alcohol Use Not Currently     Social History     Substance and Sexual Activity   Drug Use Never     Social History     Tobacco Use   Smoking Status Never Smoker   Smokeless Tobacco Never Used       FAMILY HISTORY:  Family History   Problem Relation Age of Onset    Diabetes Brother     Coronary artery disease Mother        MEDICATIONS:    Current Facility-Administered Medications:     atorvastatin (LIPITOR) tablet 80 mg, 80 mg, Oral, After Dinner, GAVINO Blank    azaTHIOprine (IMURAN) tablet 50 mg, 50 mg, Oral, Daily, GAVINO Blank, 50 mg at 10/11/19 1304    calcitriol (ROCALTROL) capsule 1 mcg, 1 mcg, Oral, Once per day on Mon Wed Fri, GAVINO Blank, 1 mcg at 10/11/19 1304    heparin (porcine) subcutaneous injection 5,000 Units, 5,000 Units, Subcutaneous, Q8H [address], GAVINO Blank, 5,000 Units at 10/11/19 1421    insulin lispro (HumaLOG) 100 units/mL subcutaneous injection 2-12 Units, 2-12 Units, Subcutaneous, TID With Meals, 6 Units at 10/11/19 1307 **AND** Fingerstick Glucose (POCT), , , TID AC, GAVINO Blank    levalbuterol [facility]) inhalation solution 1 25 mg, 1 25 mg, Nebulization, Q8H PRN, GAVINO Blank    [START ON 10/12/2019] metoprolol succinate (TOPROL-XL) 24 hr tablet 50 mg, 50 mg, Oral, Daily, GAVINO Wheeler    pantoprazole (PROTONIX) injection 40 mg, 40 mg, Intravenous, Q24H [address], GAVINO Blank, 40 mg at 10/11/19 1420    predniSONE tablet 5 mg, 5 mg, Oral, Daily, GAVINO Blank, 5 mg at 10/11/19 1302    tacrolimus (PROGRAF) capsule 2 mg, 2 mg, Oral, QPM, GAVINO Blank    tacrolimus (PROGRAF) capsule 3 mg, 3 mg, Oral, Daily, GAVINO Blank, 3 mg at 10/11/19 1304    REVIEW OF SYSTEMS:    All the systems were reviewed and were negative except as documented on the HPI  PHYSICAL EXAM:  Current Weight:    First Weight:    Vitals:    10/11/19 1100 10/11/19 1155 10/11/19 1200 10/11/19 1302   BP: (!) 172/82  (!) 179/81 (!) 184/81   Pulse: 90  92 95   Resp: 22  (!) 25    Temp:  99 °F (37 2 °C)     TempSrc:  Temporal     SpO2: 97%  99%        Intake/Output Summary (Last 24 hours) at 10/11/2019 1508  Last data filed at 10/11/2019 1301  Gross per 24 hour   Intake --   Output 350 ml   Net -350 ml     General: conscious, cooperative, in no acute distress  Eyes: conjunctivae pink, anicteric sclerae  ENT: lips and mucous membranes moist  Neck: supple, no JVD  Chest: clear breath sounds bilateral, no crackles, ronchus or wheezings  CVS: normal rate, regular rhythm  Abdomen: soft, non-tender, non-distended, normoactive bowel sounds  Extremities: no edema of both legs  Skin: no rash  Neuro: awake, alert, oriented  Psych:  Pleasant affect    Invasive Devices:      Lab Results:   Results from last 7 days   Lab Units 10/11/19  0747 10/11/19  0346   WBC Thousand/uL  --  14 61*   HEMOGLOBIN g/dL  --  8 1*   HEMATOCRIT %  --  26 0*   PLATELETS Thousands/uL  --  436*   POTASSIUM mmol/L  --  5 2   CHLORIDE mmol/L  --  102   CO2 mmol/L  --  27   BUN mg/dL  --  93*   CREATININE mg/dL  --  4 76*   CALCIUM mg/dL  --  8 0*   MAGNESIUM mg/dL  --  2 2   ALK PHOS U/L  --  161*   ALT U/L  --  55   AST U/L  --  115*   NITRITE UA  Negative  --    BLOOD UA  Small*  --    LEUKOCYTES UA  Negative  --        Other Studies:    Chest x-ray 1  Large increased opacity overlying the right lower lung field compatible with pneumonia in the appropriate clinical setting  2  Cardiomegaly and pulmonary vascular congestion

## 2019-10-11 NOTE — ED PROVIDER NOTES
History  Chief Complaint   Patient presents with    Shortness of Breath     sudden onset of resp  distress around 0230,      Patient: Jose Daniel Flowers  48 y o /male  YOB: 1970  MRN: [id number]  PCP: Ronni Marinelli DO  Date of evaluation: 10/11/2019    (N B   [address] may have been used in the preparation of this document  Occasional wrong word or "sound-alike" substitutions may have occurred due to the inherent limitations of voice recognition software  Interpretation should be guided by context )    History provided by:  EMS personnel  History limited by:  Severe respiratory distress  Shortness of Breath   Onset quality:  Sudden  Chronicity:  Recurrent      Prior to Admission Medications   Prescriptions Last Dose Informant Patient Reported? Taking?    albuterol (2 5 mg/3 mL) 0 083 % nebulizer solution   Yes Yes   Sig: Inhale 2 5 mg   atorvastatin (LIPITOR) 80 mg tablet   Yes Yes   Sig: Take 80 mg by mouth daily    azaTHIOprine (IMURAN) 50 mg tablet   Yes Yes   Sig: Take 50 mg by mouth   calcitriol (ROCALTROL) 0 5 MCG capsule  Self Yes Yes   Sig: Take 1 mcg by mouth daily Monday Wednesday, Friday   chlorhexidine (PERIDEX) 0 12 % solution   No Yes   Sig: Apply 15 mL to the mouth or throat every 12 (twelve) hours   dextrose 5 %   No No   Sig: Infuse 125 mL/hr into a venous catheter continuous   insulin regular (HumuLIN R,NovoLIN R) infusion   No No   Sig: Infuse 0 1-30 Units/hr into a venous catheter continuous   metoprolol succinate (TOPROL-XL) 25 mg 24 hr tablet   No Yes   Sig: Take 2 tablets (50 mg total) by mouth daily   pantoprozole (PROTONIX) infusion (Continuous)   No No   Sig: Infuse 8 mg/hr into a venous catheter continuous   predniSONE 5 mg tablet   Yes Yes   Sig: Take 5 mg by mouth daily   sodium bicarbonate infusion   No No   Sig: Infuse 100 mL/hr into a venous catheter continuous   tacrolimus (PROGRAF) 1 mg capsule   No Yes   Sig: 3 caps in the AM, 2 caps PM/HS Facility-Administered Medications: None       Past Medical History:   Diagnosis Date    Bacteremia 12/21/2018    [facility]     [facility]     Diabetes mellitus type 1 ([facility]     GI bleed     Hypertension     Infection at site of external fixator pin ([facility]     [facility]     Pneumonia     Last Assessed [id number]    Renal failure     Renal transplant, status post 07/21/2007       Past Surgical History:   Procedure Laterality Date    ANKLE FRACTURE SURGERY      ANKLE HARDWARE REMOVAL Right 7/31/2017    Procedure: REMOVAL HARDWARE ANKLE;  Surgeon: Jing Rojas MD;  [facility]: [facility];  Service: Orthopedics    ANKLE HARDWARE REMOVAL Right 8/17/2017    Procedure: TIBIA FAILED HARDWARE REMOVAL;  Surgeon: Jing Rojas MD;  [facility];  Service: Orthopedics    CARDIAC SURGERY      2 stents    CLOSED REDUCTION ANKLE Right 7/3/2017    Procedure: CLOSED REDUCTION DISTAL TIB-FIB AND CASTING VS;  Surgeon: Jing Rojas MD;  [facility];  Service: Orthopedics    ESOPHAGOGASTRODUODENOSCOPY N/A 12/20/2018    Procedure: ESOPHAGOGASTRODUODENOSCOPY (EGD) in ICU; Surgeon: Adriana Voss MD;  [facility]: [facility]; Service: Gastroenterology    EYE SURGERY      FRACTURE SURGERY      ORIF Rt Ankle    GLUTAMIC ACID DECARBOXYLASE (HISTORICAL)      IR PICC LINE  8/20/2018    IR VENOUS LINE REMOVAL  10/5/2018    LEG AMPUTATION Right 02/01/2019    Above the knee    NEPHRECTOMY TRANSPLANTED ORGAN      [place] OPEN TREATMENT FRACTURE DISTAL TIBIA FIBULA Right 7/3/2017    Procedure: OPEN REDUCTION W/ INTERNAL FIXATION (ORIF);   Surgeon: Jing Rojas MD;  [facility]: [facility];  Service: Orthopedics    TOE AMPUTATION Right 10/27/2016    Procedure: AMPUTATION TOE;  Surgeon: Author Wil DPM;  [facility]: [facility];  Service:        Family History   Problem Relation Age of Onset    Diabetes Brother     Coronary artery disease Mother      I have reviewed and agree with the history as documented  Social History     Tobacco Use    Smoking status: Never Smoker    Smokeless tobacco: Never Used   Substance Use Topics    Alcohol use: Not Currently    Drug use: Never        Review of Systems   Unable to perform ROS: Severe respiratory distress   Respiratory: Positive for shortness of breath  Physical Exam  Physical Exam   Constitutional: He is oriented to person, place, and time  He appears well-developed and well-nourished  He is cooperative  He appears ill  He appears distressed  Amputee   HENT:   Mouth/Throat: Oropharynx is clear and moist and mucous membranes are normal    Voice normal   Eyes: Pupils are equal, round, and reactive to light  EOM are normal    Cardiovascular: Normal rate and regular rhythm  Pulmonary/Chest: Effort normal    Abdominal: Soft  Bowel sounds are normal    Neurological: He is alert and oriented to person, place, and time  GCS eye subscore is 4  GCS verbal subscore is 5  GCS motor subscore is 6  Skin: Skin is warm and dry  There is pallor  Psychiatric: He has a normal mood and affect  His speech is normal and behavior is normal    Nursing note and vitals reviewed        Vital Signs  ED Triage Vitals   Temperature Pulse Respirations Blood Pressure SpO2   10/11/19 0322 10/11/19 0322 10/11/19 0322 10/11/19 0322 10/11/19 0322   (!) 96 7 °F (35 9 °C) (!) 109 (!) 32 (!) 230/101 100 %      Temp Source Heart Rate Source Patient Position - Orthostatic VS BP Location FiO2 (%)   10/11/19 0322 10/11/19 0322 10/11/19 0322 10/11/19 0322 10/11/19 0333   Temporal Monitor Lying Left arm 50      Pain Score       --                  Vitals:    10/11/19 0405 10/11/19 0410 10/11/19 0415 10/11/19 0420   BP: (!) 178/88 (!) 174/90 (!) 188/92 (!) 172/83   Pulse: 98 98 96 90   Patient Position - Orthostatic VS:             Visual Acuity      ED Medications  Medications   nitroGLYcerin (TRIDIL) 50 mg in 250 mL infusion (premix) (5 mcg/min Intravenous New Bag 10/11/19 0358)   albuterol inhalation solution 10 mg (10 mg Nebulization Given 10/11/19 0326)   ipratropium (ATROVENT) 0 02 % inhalation solution 1 mg (1 mg Nebulization Given 10/11/19 0326)   sodium chloride 0 9 % inhalation solution 12 mL (12 mL Nebulization Given 10/11/19 0326)   methylPREDNISolone sodium succinate (Solu-MEDROL) injection 125 mg (125 mg Intravenous Given 10/11/19 0356)   furosemide (LASIX) injection 40 mg (40 mg Intravenous Given 10/11/19 0357)       Diagnostic Studies  Results Reviewed     Procedure Component Value Units Date/Time    Lactic acid x2 [[id number]]  (Abnormal) Collected:  10/11/19 0346    Lab Status:  Final result Specimen:  Blood from Arm, Right Updated:  10/11/19 0430     LACTIC ACID 2 1 mmol/L     Narrative:       Result may be elevated if tourniquet was used during collection      Magnesium [[id number]]  (Normal) Collected:  10/11/19 0346    Lab Status:  Final result Specimen:  Blood from Arm, Right Updated:  10/11/19 0424     Magnesium 2 2 mg/dL     NT-BNP PRO [[id number]]  (Abnormal) Collected:  10/11/19 0346    Lab Status:  Final result Specimen:  Blood from Arm, Right Updated:  10/11/19 0424     NT-proBNP 78,897 pg/mL     Comprehensive metabolic panel [[id number]]  (Abnormal) Collected:  10/11/19 0346    Lab Status:  Final result Specimen:  Blood from Arm, Right Updated:  10/11/19 0424     Sodium 141 mmol/L      Potassium 5 2 mmol/L      Chloride 102 mmol/L      CO2 27 mmol/L      ANION GAP 12 mmol/L      BUN 93 mg/dL      Creatinine 4 76 mg/dL      Glucose 187 mg/dL      Calcium 8 0 mg/dL       U/L      ALT 55 U/L      Alkaline Phosphatase 161 U/L      Total Protein 6 7 g/dL      Albumin 2 5 g/dL      Total Bilirubin 0 60 mg/dL      eGFR 13 ml/min/1 73sq m     Narrative:       Christina guidelines for Chronic Kidney Disease (CKD):     Stage 1 with normal or high GFR (GFR > 90 mL/min/1 73 square meters)    Stage 2 Mild CKD (GFR = 60-89 mL/min/1 73 square meters)    Stage 3A Moderate CKD (GFR = 45-59 mL/min/1 73 square meters)    Stage 3B Moderate CKD (GFR = 30-44 mL/min/1 73 square meters)    Stage 4 Severe CKD (GFR = 15-29 mL/min/1 73 square meters)    Stage 5 End Stage CKD (GFR <15 mL/min/1 73 square meters)  Note: GFR calculation is accurate only with a steady state creatinine    Troponin I [[id number]]  (Abnormal) Collected:  10/11/19 0346    Lab Status:  Final result Specimen:  Blood from Arm, Right Updated:  10/11/19 0418     Troponin I 0 06 ng/mL     Protime-INR [[id number]]  (Normal) Collected:  10/11/19 0346    Lab Status:  Final result Specimen:  Blood from Arm, Right Updated:  10/11/19 0405     Protime 13 1 seconds      INR 0 99    APTT [[id number]]  (Normal) Collected:  10/11/19 0346    Lab Status:  Final result Specimen:  Blood from Arm, Right Updated:  10/11/19 0405     PTT 31 seconds     Procalcitonin [[id number]] Updated:  10/11/19 0401    Lab Status:  No result Specimen:  Blood from Arm, Right     Blood gas, Venous [[id number]]  (Abnormal) Collected:  10/11/19 0346    Lab Status:  Final result Specimen:  Blood from Arm, Right Updated:  10/11/19 0355     pH, Mio 7 349     pCO2, Mio 48 8 mm Hg      pO2, Mio 66 2 mm Hg      HCO3, Mio 26 3 mmol/L      Base Excess, Mio 0 4 mmol/L      O2 Content, Mio 11 1 ml/dL      O2 HGB, VENOUS 88 4 %     CBC and differential [[id number]]  (Abnormal) Collected:  10/11/19 0346    Lab Status:  Final result Specimen:  Blood from Arm, Right Updated:  10/11/19 0353     WBC 14 61 Thousand/uL      RBC 2 70 Million/uL      Hemoglobin 8 1 g/dL      Hematocrit 26 0 %      MCV 96 fL      MCH 30 0 pg      MCHC 31 2 g/dL      RDW 15 9 %      MPV 8 5 fL      Platelets 413 Thousands/uL      nRBC 0 /100 WBCs      Neutrophils Relative 67 %      Immat GRANS % 1 %      Lymphocytes Relative 25 %      Monocytes Relative 5 %      Eosinophils Relative 2 %      Basophils Relative 0 %      Neutrophils Absolute 9 82 Thousands/µL      Immature Grans Absolute 0 11 Thousand/uL      Lymphocytes Absolute 3 60 Thousands/µL      Monocytes Absolute 0 77 Thousand/µL      Eosinophils Absolute 0 26 Thousand/µL      Basophils Absolute 0 05 Thousands/µL     Blood culture #1 [[id number]] Collected:  10/11/19 0346    Lab Status: In process Specimen:  Blood from Arm, Right Updated:  10/11/19 0350    Lactic acid x2 [[id number]]     Lab Status:  No result Specimen:  Blood     Blood culture #2 [[id number]]     Lab Status:  No result Specimen:  Blood     UA w Reflex to Microscopic w Reflex to Culture [[id number]]     Lab Status:  No result Specimen:  Urine                  XR chest portable   Final Result by Talya Kessler MD ([id number]/61 [id number])      1  Large increased opacity overlying the right lower lung field compatible with pneumonia in the appropriate clinical setting  2   Cardiomegaly and pulmonary vascular congestion              Workstation performed: [id number]                    Procedures  CriticalCare Time  Performed by: Rosa Cosme MD  Authorized by: Rosa Cosme MD     Critical care provider statement:     Critical care time (minutes):  35    Critical care time was exclusive of:  Separately billable procedures and treating other patients and teaching time    Critical care was necessary to treat or prevent imminent or life-threatening deterioration of the following conditions:  Respiratory failure    Critical care was time spent personally by me on the following activities:  Obtaining history from patient or surrogate, development of treatment plan with patient or surrogate, evaluation of patient's response to treatment, examination of patient, review of old charts, re-evaluation of patient's condition, ordering and review of radiographic studies and ordering and review of laboratory studies    I assumed direction of critical care for this patient from another provider in my specialty: no             ED Course  ED Course as of Oct 16 1021   Fri Oct 11, 2019   0444 pH, Mio: 7 349   0444 pCO2, Mio: 48 8   0444 WBC(!): 14 61   0444 Hemoglobin(!): 8 1   0444 Platelet Count(!): 556   0444 Sodium: 141   0444 Potassium: 5 2   0444 Chloride: 102   0444 Anion Gap: 12   0444 BUN(!): 93   0444 Creatinine(!): 4 76   0445 few days ago was bun 116, Cr 5 52      0446 BUN(!): 93   0446 Creatinine(!): 4 76   0447 LACTIC ACID(!!): 2 1   0500 Pt is alert, cooperative, NAD  Doing well on BiPAP  Family at bedside  Questions answered  0509 ROD Jj PA-C for [facility]  is in the ED to evaluate pt - we want to make sure he appears stable to be admitted here vs needing transfer  0559 ROD Jj PA-C for [facility]  relates that Dr Kg Armas for [facility]  feels we should transfer this patient due to the need for intensivist care  Carlos Gabriel at [facility]  suggests we call [facility] at [facility]  - pt seemed stable for [facility]  MDM  Number of Diagnoses or Management Options     Amount and/or Complexity of Data Reviewed  Tests in the radiology section of CPT®: ordered and reviewed  Tests in the medicine section of CPT®: ordered and reviewed  Obtain history from someone other than the patient: yes  Independent visualization of images, tracings, or specimens: yes    Risk of Complications, Morbidity, and/or Mortality  Presenting problems: high  Management options: high    Patient Progress  Patient progress: improved      Disposition  Final diagnoses:   None     ED Disposition     None      Follow-up Information    None         Patient's Medications   Discharge Prescriptions    No medications on file     No discharge procedures on file      ED Provider  Electronically Signed by           Nasra Banegas MD  10/11/19 [address], [place]  10/11/19 8650       Nasra Banegas MD  10/11/19 9206       Nasra Banegas MD  10/16/19 1021

## 2019-10-11 NOTE — ED NOTES
Attempting to transition pt to CPAP for transport  Pt tolerating poorly  Respiratory at bedside       Franklin Chaudhari RN  10/11/19 3729

## 2019-10-11 NOTE — ED NOTES
Patient BiPAP settings are IPAP 16, EPAP 7, RR 22, and 35% oxygen        Marc Wright RN  10/11/19 3292

## 2019-10-11 NOTE — ED NOTES
Verbal orders to hold on blood work due to transport being in building and low suspicion for sepsis      Gaby Tellez RN  10/11/19 5312

## 2019-10-11 NOTE — ED PROCEDURE NOTE
PROCEDURE  ECG 12 Lead Documentation Only  Date/Time: 10/11/2019 3:26 AM  Performed by: Alissa Baltazar MD  Authorized by: Alissa Baltazar MD     Indications / Diagnosis:  Sob  ECG reviewed by me, the ED Provider: yes    Patient location:  ED  Previous ECG:     Previous ECG:  Compared to current    Comparison ECG info:  [place] in MUSE    Similarity:  No change    Comparison to cardiac monitor: Yes    Interpretation:     Interpretation: abnormal    Rate:     ECG rate:  103    ECG rate assessment: tachycardic    Rhythm:     Rhythm: sinus tachycardia    Ectopy:     Ectopy: none    QRS:     QRS axis:  Normal    QRS intervals:  Normal  Conduction:     Conduction: normal    ST segments:     ST segments:  Abnormal    Depression:  V4, V5 and V6         Alissa Baltazar MD  10/11/19 0629

## 2019-10-12 LAB
ANION GAP SERPL CALCULATED.3IONS-SCNC: 12 MMOL/L (ref 4–13)
BASOPHILS # BLD AUTO: 0.02 THOUSANDS/ΜL (ref 0–0.1)
BASOPHILS NFR BLD AUTO: 0 % (ref 0–1)
BUN SERPL-MCNC: 110 MG/DL (ref 5–25)
CALCIUM SERPL-MCNC: 8 MG/DL (ref 8.3–10.1)
CHLORIDE SERPL-SCNC: 100 MMOL/L (ref 100–108)
CO2 SERPL-SCNC: 27 MMOL/L (ref 21–32)
CREAT SERPL-MCNC: 5.18 MG/DL (ref 0.6–1.3)
EOSINOPHIL # BLD AUTO: 0.04 THOUSAND/ΜL (ref 0–0.61)
EOSINOPHIL NFR BLD AUTO: 0 % (ref 0–6)
ERYTHROCYTE [DISTWIDTH] IN BLOOD BY AUTOMATED COUNT: 16.1 % (ref 11.6–15.1)
GFR SERPL CREATININE-BSD FRML MDRD: 12 ML/MIN/1.73SQ M
GLUCOSE SERPL-MCNC: 124 MG/DL (ref 65–140)
GLUCOSE SERPL-MCNC: 175 MG/DL (ref 65–140)
GLUCOSE SERPL-MCNC: 191 MG/DL (ref 65–140)
GLUCOSE SERPL-MCNC: 204 MG/DL (ref 65–140)
GLUCOSE SERPL-MCNC: 223 MG/DL (ref 65–140)
GLUCOSE SERPL-MCNC: 434 MG/DL (ref 65–140)
GLUCOSE SERPL-MCNC: 439 MG/DL (ref 65–140)
GLUCOSE SERPL-MCNC: 97 MG/DL (ref 65–140)
HCT VFR BLD AUTO: 22.7 % (ref 36.5–49.3)
HGB BLD-MCNC: 7 G/DL (ref 12–17)
IMM GRANULOCYTES # BLD AUTO: 0.03 THOUSAND/UL (ref 0–0.2)
IMM GRANULOCYTES NFR BLD AUTO: 0 % (ref 0–2)
LYMPHOCYTES # BLD AUTO: 1.58 THOUSANDS/ΜL (ref 0.6–4.47)
LYMPHOCYTES NFR BLD AUTO: 17 % (ref 14–44)
MCH RBC QN AUTO: 29.7 PG (ref 26.8–34.3)
MCHC RBC AUTO-ENTMCNC: 30.8 G/DL (ref 31.4–37.4)
MCV RBC AUTO: 96 FL (ref 82–98)
MONOCYTES # BLD AUTO: 0.88 THOUSAND/ΜL (ref 0.17–1.22)
MONOCYTES NFR BLD AUTO: 10 % (ref 4–12)
NEUTROPHILS # BLD AUTO: 6.74 THOUSANDS/ΜL (ref 1.85–7.62)
NEUTS SEG NFR BLD AUTO: 73 % (ref 43–75)
NRBC BLD AUTO-RTO: 0 /100 WBCS
PHOSPHATE SERPL-MCNC: 7 MG/DL (ref 2.7–4.5)
PLATELET # BLD AUTO: 261 THOUSANDS/UL (ref 149–390)
PMV BLD AUTO: 8.6 FL (ref 8.9–12.7)
POTASSIUM SERPL-SCNC: 4.3 MMOL/L (ref 3.5–5.3)
PTH-INTACT SERPL-MCNC: 450 PG/ML (ref 18.4–80.1)
RBC # BLD AUTO: 2.36 MILLION/UL (ref 3.88–5.62)
SODIUM SERPL-SCNC: 139 MMOL/L (ref 136–145)
TACROLIMUS BLD-MCNC: 3.9 NG/ML (ref 2–20)
WBC # BLD AUTO: 9.29 THOUSAND/UL (ref 4.31–10.16)

## 2019-10-12 PROCEDURE — 99232 SBSQ HOSP IP/OBS MODERATE 35: CPT | Performed by: INTERNAL MEDICINE

## 2019-10-12 PROCEDURE — 99233 SBSQ HOSP IP/OBS HIGH 50: CPT | Performed by: INTERNAL MEDICINE

## 2019-10-12 PROCEDURE — 85025 COMPLETE CBC W/AUTO DIFF WBC: CPT | Performed by: NURSE PRACTITIONER

## 2019-10-12 PROCEDURE — 84100 ASSAY OF PHOSPHORUS: CPT | Performed by: NURSE PRACTITIONER

## 2019-10-12 PROCEDURE — NC001 PR NO CHARGE: Performed by: NURSE PRACTITIONER

## 2019-10-12 PROCEDURE — 82948 REAGENT STRIP/BLOOD GLUCOSE: CPT

## 2019-10-12 PROCEDURE — 83970 ASSAY OF PARATHORMONE: CPT | Performed by: NURSE PRACTITIONER

## 2019-10-12 PROCEDURE — C9113 INJ PANTOPRAZOLE SODIUM, VIA: HCPCS | Performed by: NURSE PRACTITIONER

## 2019-10-12 PROCEDURE — 80048 BASIC METABOLIC PNL TOTAL CA: CPT | Performed by: NURSE PRACTITIONER

## 2019-10-12 RX ORDER — PANTOPRAZOLE SODIUM 40 MG/1
40 TABLET, DELAYED RELEASE ORAL
Status: DISCONTINUED | OUTPATIENT
Start: 2019-10-13 | End: 2019-10-15 | Stop reason: HOSPADM

## 2019-10-12 RX ORDER — NIFEDIPINE 30 MG/1
30 TABLET, EXTENDED RELEASE ORAL DAILY
Status: DISCONTINUED | OUTPATIENT
Start: 2019-10-13 | End: 2019-10-15 | Stop reason: HOSPADM

## 2019-10-12 RX ORDER — TORSEMIDE 20 MG/1
20 TABLET ORAL DAILY
Status: DISCONTINUED | OUTPATIENT
Start: 2019-10-13 | End: 2019-10-13

## 2019-10-12 RX ORDER — CALCITRIOL 0.25 UG/1
1 CAPSULE, LIQUID FILLED ORAL DAILY
Status: DISCONTINUED | OUTPATIENT
Start: 2019-10-13 | End: 2019-10-13

## 2019-10-12 RX ORDER — TACROLIMUS 1 MG/1
2 CAPSULE ORAL EVERY EVENING
Status: DISCONTINUED | OUTPATIENT
Start: 2019-10-12 | End: 2019-10-15 | Stop reason: HOSPADM

## 2019-10-12 RX ORDER — SEVELAMER HYDROCHLORIDE 800 MG/1
800 TABLET, FILM COATED ORAL
Status: DISCONTINUED | OUTPATIENT
Start: 2019-10-12 | End: 2019-10-15 | Stop reason: HOSPADM

## 2019-10-12 RX ADMIN — ATORVASTATIN CALCIUM 80 MG: 80 TABLET, FILM COATED ORAL at 18:08

## 2019-10-12 RX ADMIN — PANTOPRAZOLE SODIUM 40 MG: 40 INJECTION, POWDER, FOR SOLUTION INTRAVENOUS at 08:28

## 2019-10-12 RX ADMIN — FUROSEMIDE 40 MG: 10 INJECTION, SOLUTION INTRAMUSCULAR; INTRAVENOUS at 06:37

## 2019-10-12 RX ADMIN — HEPARIN SODIUM 5000 UNITS: 5000 INJECTION INTRAVENOUS; SUBCUTANEOUS at 05:11

## 2019-10-12 RX ADMIN — PREDNISONE 5 MG: 2.5 TABLET ORAL at 08:28

## 2019-10-12 RX ADMIN — FUROSEMIDE 40 MG: 10 INJECTION, SOLUTION INTRAMUSCULAR; INTRAVENOUS at 12:34

## 2019-10-12 RX ADMIN — HEPARIN SODIUM 5000 UNITS: 5000 INJECTION INTRAVENOUS; SUBCUTANEOUS at 14:20

## 2019-10-12 RX ADMIN — NIFEDIPINE 30 MG: 30 TABLET, FILM COATED, EXTENDED RELEASE ORAL at 08:28

## 2019-10-12 RX ADMIN — METOPROLOL SUCCINATE 50 MG: 50 TABLET, EXTENDED RELEASE ORAL at 08:28

## 2019-10-12 RX ADMIN — INSULIN DETEMIR 7 UNITS: 100 INJECTION, SOLUTION SUBCUTANEOUS at 22:41

## 2019-10-12 RX ADMIN — INSULIN LISPRO 2 UNITS: 100 INJECTION, SOLUTION INTRAVENOUS; SUBCUTANEOUS at 12:34

## 2019-10-12 RX ADMIN — HEPARIN SODIUM 5000 UNITS: 5000 INJECTION INTRAVENOUS; SUBCUTANEOUS at 22:41

## 2019-10-12 RX ADMIN — INSULIN DETEMIR 6 UNITS: 100 INJECTION, SOLUTION SUBCUTANEOUS at 08:58

## 2019-10-12 RX ADMIN — TACROLIMUS 2 MG: 1 CAPSULE ORAL at 22:41

## 2019-10-12 RX ADMIN — TACROLIMUS 3 MG: 1 CAPSULE ORAL at 08:29

## 2019-10-12 RX ADMIN — AZATHIOPRINE 50 MG: 50 TABLET ORAL at 08:29

## 2019-10-12 NOTE — PLAN OF CARE
Problem: Potential for Falls  Goal: Patient will remain free of falls  Description  INTERVENTIONS:  - Assess patient frequently for physical needs  -  Identify cognitive and physical deficits and behaviors that affect risk of falls    -  [place] fall precautions as indicated by assessment   - Educate patient/family on patient safety including physical limitations  - Instruct patient to call for assistance with activity based on assessment  - Modify environment to reduce risk of injury  - Consider OT/PT consult to assist with strengthening/mobility  10/12/2019 0928 by Erlin Katz RN  Outcome: Progressing  10/12/2019 0927 by Erlin Katz RN  Outcome: Progressing     Problem: Prexisting or High Potential for Compromised Skin Integrity  Goal: Skin integrity is maintained or improved  Description  INTERVENTIONS:  - Identify patients at risk for skin breakdown  - Assess and monitor skin integrity  - Assess and monitor nutrition and hydration status  - Monitor labs   - Assess for incontinence   - Turn and reposition patient  - Assist with mobility/ambulation  - Relieve pressure over bony prominences  - Avoid friction and shearing  - Provide appropriate hygiene as needed including keeping skin clean and dry  - Evaluate need for skin moisturizer/barrier cream  - Collaborate with interdisciplinary team   - Patient/family teaching  - Consider wound care consult   10/12/2019 0928 by Erlin Katz RN  Outcome: Progressing  10/12/2019 0927 by Erlin Katz RN  Outcome: Progressing     Problem: GENITOURINARY - ADULT  Goal: Maintains or returns to baseline urinary function  Description  INTERVENTIONS:  - Assess urinary function  - Encourage oral fluids to ensure adequate hydration if ordered  - Administer IV fluids as ordered to ensure adequate hydration  - Administer ordered medications as needed  - Offer frequent toileting  - Follow urinary retention protocol if ordered  Outcome: Progressing  Goal: Absence of urinary retention  Description  INTERVENTIONS:  - Assess patients ability to void and empty bladder  - Monitor I/O  - Bladder scan as needed  - Discuss with physician/AP medications to alleviate retention as needed  - Discuss catheterization for long term situations as appropriate  Outcome: Progressing  Goal: Urinary catheter remains patent  Description  INTERVENTIONS:  - Assess patency of urinary catheter  - If patient has a chronic krishnan, consider changing catheter if non-functioning  - Follow guidelines for intermittent irrigation of non-functioning urinary catheter  Outcome: Progressing     Problem: METABOLIC, FLUID AND ELECTROLYTES - ADULT  Goal: Electrolytes maintained within normal limits  Description  INTERVENTIONS:  - Monitor labs and assess patient for signs and symptoms of electrolyte imbalances  - Administer electrolyte replacement as ordered  - Monitor response to electrolyte replacements, including repeat lab results as appropriate  - Instruct patient on fluid and nutrition as appropriate  Outcome: Progressing  Goal: Fluid balance maintained  Description  INTERVENTIONS:  - Monitor labs   - Monitor I/O and WT  - Instruct patient on fluid and nutrition as appropriate  - Assess for signs & symptoms of volume excess or deficit  Outcome: Progressing  Goal: Glucose maintained within target range  Description  INTERVENTIONS:  - Monitor Blood Glucose as ordered  - Assess for signs and symptoms of hyperglycemia and hypoglycemia  - Administer ordered medications to maintain glucose within target range  - Assess nutritional intake and initiate nutrition service referral as needed  Outcome: Progressing

## 2019-10-12 NOTE — PROGRESS NOTES
Progress Note - ICU Transfer to SD/MS tele/MS   Ed Yates 51 y o  male MRN: [id number]  [facility]   Unit/Bed#: ICU 84 Encounter: [id number]    Code Status: level 1  Referring Physician: Aline Sigala  Accepting Physician: Michael Otero  _____________________________________________________________________    Reason for ICU/SD admission:  Hypertensive urgency, flash pulmonary edema, and acute on chronic kidney injury  History of Present Illness:  19-year-old man with a known prior history significant for end-stage kidney disease  Prior history of kidney transplant  Presented to the emergency department at [facility] with complaints of progressive shortness of breath and left lower extremity edema  Upon arrival the patient was found to be significantly hypoxic  He was treated with Lasix, nitroglycerin, and steroids  Given the previous history of intubation patient was transferred to [address] [id number] for higher level of care  Initial treatment at [facility] was very effective  Upon arrival to [place] [address] Intensive Care Unit the patient was placed on nasal cannula and felt much better  He was monitored overnight, eventually off oxygen, saturating well on room air  Nephrology was consulted and is in discussion with the patient preparing for of long-term care including hemodialysis  Patient was deemed medically fit for transfer to the medical-surgical unit where he will continue to undergo diuresis        Consultants:  Nephrology  _____________________________________________________________________    Diagnostic Data:  CBC:  Results from last 7 days   Lab Units 10/12/19  0416   WBC Thousand/uL 9 29   HEMOGLOBIN g/dL 7 0*   HEMATOCRIT % 22 7*   PLATELETS Thousands/uL 261      CMP:   Lab Results   Component Value Date    SODIUM 139 10/12/2019    K 4 3 10/12/2019     10/12/2019    CO2 27 10/12/2019     (H) 10/12/2019    CREATININE 5 18 (H) 10/12/2019    CALCIUM 8 0 (L) 10/12/2019    EGFR 12 10/12/2019   ,   PT/INR: No results found for: PT, INR,   Magnesium: No components found for: MAG,  Phosphorous:   Lab Results   Component Value Date    PHOS 7 0 (H) 10/12/2019       ABG: No results found for: PHART, OJD7OLK, PO2ART, YQB8BEW, C7CFYDYS, BEART, SOURCE,     Microbiology:  Results from last 7 days   Lab Units 10/11/19  0346   BLOOD CULTURE  No Growth at 24 hrs  Imaging: I have personally reviewed the pertinent imaging studies on the PACS system  Chest x-ray done 11 October 2019:   1  Large increased opacity overlying the right lower lung field compatible with pneumonia in the appropriate clinical setting  2   Cardiomegaly and pulmonary vascular congestion        Cardiac/EKG/telemetry/Echo:   Results from last 7 days   Lab Units 10/11/19  0346   TROPONIN I ng/mL 0 06*   NT-PRO BNP pg/mL 78,897*     Sinus rhythm  _____________________________________________________________________    Recent or scheduled procedures:  None    Outstanding/pending diagnostics:  None     Mobilization Plan:  Patient has a right BKA, will need assistance with mobilization        Spoke with Dr Matthew West  regarding transfer  Please call [phone] with any questions or concerns  Portions of the record may have been created with voice recognition software  Occasional wrong word or "sound a like" substitutions may have occurred due to the inherent limitations of voice recognition software  Read the chart carefully and recognize, using context, where substitutions have occurred      GAVINO Dickinson

## 2019-10-12 NOTE — PLAN OF CARE
Problem: Potential for Falls  Goal: Patient will remain free of falls  Description  INTERVENTIONS:  - Assess patient frequently for physical needs  -  Identify cognitive and physical deficits and behaviors that affect risk of falls    -  [place] fall precautions as indicated by assessment   - Educate patient/family on patient safety including physical limitations  - Instruct patient to call for assistance with activity based on assessment  - Modify environment to reduce risk of injury  - Consider OT/PT consult to assist with strengthening/mobility  Outcome: Progressing     Problem: Prexisting or High Potential for Compromised Skin Integrity  Goal: Skin integrity is maintained or improved  Description  INTERVENTIONS:  - Identify patients at risk for skin breakdown  - Assess and monitor skin integrity  - Assess and monitor nutrition and hydration status  - Monitor labs   - Assess for incontinence   - Turn and reposition patient  - Assist with mobility/ambulation  - Relieve pressure over bony prominences  - Avoid friction and shearing  - Provide appropriate hygiene as needed including keeping skin clean and dry  - Evaluate need for skin moisturizer/barrier cream  - Collaborate with interdisciplinary team   - Patient/family teaching  - Consider wound care consult   Outcome: Progressing

## 2019-10-12 NOTE — PROGRESS NOTES
NEPHROLOGY PROGRESS NOTE   Anthony Reed 51 y o  male MRN: [id number]  Unit/Bed#: ICU 13 Encounter: [id number]  Reason for Consult: MIKE, renal transplant recipient  ASSESSMENT/PLAN:  MIKE:  Recent hospital admission with transfer to [facility] with creatinine of 6 5   -presented with sCr of 5 5, improved, worsened today likely due to diuretics as well as possibly hemo dynamics  - receiving IV Lasix 40 mg TID, target negative fluid balance 1-2 L    -UOP 1 2 L    -will discuss with Dr Ary Murphy in regards to further diuretics  -UA:  Glucose, small blood, greater than 300 protein, 4-10 RBCs, 1-2 WBCs  - no urgent indication for dialysis  -would like to pursue CRRT needed   -continue to avoid nephrotoxins  -strict I/O  Access: none  CKD V:  With history of living related donor renal transplant in 1998 and in 2001 at [facility]   -baseline creatinine around 4 5   -immunosuppressive:  Tacrolimus 3 mg am/2 mg pm, azathioprine 50 mg po daily, prednisone 5 mg daily   -tac level: pending  Flash pulmonary edema/pulmonary HTN:  In the setting of persistent hypertension  Respiratory status is improving   -chest x-ray:  Probable pneumonia, cardiomegaly, and pulmonary vascular congestion  -echocardiogram shows EF of 52%   -continues IV Lasix  Hypertension: blood pressure initially elevated with SBP in the 170-180's now improved to acceptable range    -optimize hemodynamics, maintain MAP >65 mmHg   -avoid hypotension or high fluctuations in blood pressure   -continue metoprolol 50 mg daily, Procardia 30 mg daily, and p r n  Hydralazine   -place holding parameters on antihypertensive for systolic blood pressure less than 130    Hyperphosphatemia: elevated serum phos level of 7 0, likely due to decreased clearance in the setting of worening renal failure  - low phos diet  - consider phos binders if no improvement  - continue to monitor       MBD in CKD:  -continues on calcitriol 1 mcg 3 times weekly  - renal diet  -PTH level pending  Anemia: Hgb remains low  - continue to monitor and transfuse as needed   -would recommend leuko reduced, irradiated blood in the setting of transplant   -consider CAPRICE  SUBJECTIVE:  The patient is resting in bed  He is on room air  He denies chest pain or shortness of breath  He still exhibits lower extremity edema  He denies nausea, vomiting, diarrhea  He is urinating well without difficulty  OBJECTIVE:  Current Weight: Weight - Scale: 54 1 kg (119 lb 4 3 oz)  Vitals:    10/12/19 0200 10/12/19 0400 10/12/19 0500 10/12/19 0700   BP: 144/78 149/82 149/73    Pulse: 72 66 76    Resp: 20 20 (!) 31    Temp:  98 9 °F (37 2 °C)  98 5 °F (36 9 °C)   TempSrc:  Temporal  Temporal   SpO2: 100% 100% 93%    Weight:  54 1 kg (119 lb 4 3 oz)     Height:  5' (1 524 m)         Intake/Output Summary (Last 24 hours) at 10/12/2019 3824  Last data filed at 10/12/2019 0401  Gross per 24 hour   Intake 375 ml   Output 1255 ml   Net -880 ml     General: No apparent distress  Skin: warm, dry, intact, no rash  HEENT: Moist mucous membranes, sclera anicteric, normocephalic  Neck: No apparent JVD appreciated  Chest: Lung sounds clear B/L, on RA  Heart: Regular rate and rhythm, No murmer  Abdomen: Soft, round, NT, +BS  Extremities:  B/L LE edema present, Right leg amputated    Neuro: Alert and oriented  Psych: Appropriate mood and affect    Medications:    Current Facility-Administered Medications:     atorvastatin (LIPITOR) tablet 80 mg, 80 mg, Oral, After GAVINO Alexander, 80 mg at 10/11/19 1808    azaTHIOprine (IMURAN) tablet 50 mg, 50 mg, Oral, Daily, GAVINO Dickinson, 50 mg at 10/12/19 3278    calcitriol (ROCALTROL) capsule 1 mcg, 1 mcg, Oral, Once per day on Mon Wed Fri, GAVINO Duarte, 1 mcg at 10/11/19 1304    furosemide (LASIX) injection 40 mg, 40 mg, Intravenous, TID (diuretic), Skyler Whittaker, DO, 40 mg at 10/12/19 0637    heparin (porcine) subcutaneous injection 5,000 Units, 5,000 Units, Subcutaneous, Q8H [facility] GAVINO Crooks, 5,000 Units at 10/12/19 0511    hydrALAZINE (APRESOLINE) injection 10 mg, 10 mg, Intravenous, Q6H PRN, GAVINO Sanchez, 10 mg at 10/11/19 2301    insulin detemir (LEVEMIR) subcutaneous injection 6 Units, 6 Units, Subcutaneous, QAM, GAVINO Wiggins    insulin detemir (LEVEMIR) subcutaneous injection 7 Units, 7 Units, Subcutaneous, HS, GAVINO Wiggins, 7 Units at 10/11/19 2312    insulin lispro (HumaLOG) 100 units/mL subcutaneous injection 2-12 Units, 2-12 Units, Subcutaneous, TID With Meals, 12 Units at 10/11/19 2217 **AND** Fingerstick Glucose (POCT), , , TID AC, GAVINO Sanchez    levalbuterol Darreld Bird) inhalation solution 1 25 mg, 1 25 mg, Nebulization, Q8H PRN, GAVINO Sanchez    metoprolol succinate (TOPROL-XL) 24 hr tablet 50 mg, 50 mg, Oral, Daily, GAVINO Wiggins, 50 mg at 10/12/19 2770    NIFEdipine (PROCARDIA XL) 24 hr tablet 30 mg, 30 mg, Oral, Daily, GAVINO Wiggins, 30 mg at 10/12/19 0828    pantoprazole (PROTONIX) injection 40 mg, 40 mg, Intravenous, Q24H [facility], GAVINO Sanchez, 40 mg at 10/12/19 4337    predniSONE tablet 5 mg, 5 mg, Oral, Daily, GAVINO Sanchez, 5 mg at 10/12/19 9221    tacrolimus (PROGRAF) capsule 2 mg, 2 mg, Oral, QPM, GAVINO Sanchez, 2 mg at 10/11/19 1809    tacrolimus (PROGRAF) capsule 3 mg, 3 mg, Oral, Daily, GAVINO Sanchez, 3 mg at 10/12/19 3875    Laboratory Results:  Results from last 7 days   Lab Units 10/12/19  0416 10/11/19  0346   WBC Thousand/uL 9 29 14 61*   HEMOGLOBIN g/dL 7 0* 8 1*   HEMATOCRIT % 22 7* 26 0*   PLATELETS Thousands/uL 261 436*   POTASSIUM mmol/L 4 3 5 2   CHLORIDE mmol/L 100 102   CO2 mmol/L 27 27   BUN mg/dL 110* 93*   CREATININE mg/dL 5 18* 4 76*   CALCIUM mg/dL 8 0* 8 0*   MAGNESIUM mg/dL  --  2 2   PHOSPHORUS mg/dL 7 0*  --

## 2019-10-12 NOTE — UTILIZATION REVIEW
Initial Clinical Review   PATIENT ADMITTED TO ICU FOR ACUTE RESPIRATORY FAILURE     Admission: Date/Time/Statement: Inpatient Admission Orders (From admission, onward)     Ordered        10/11/19 1133  Inpatient Admission  Once                   Orders Placed This Encounter   Procedures    Inpatient Admission     Standing Status:   Standing     Number of Occurrences:   1     Order Specific Question:   Admitting Physician     Answer:   Samia Kidd [[id number]]     Order Specific Question:   Level of Care     Answer:   Critical Care [15]     Order Specific Question:   Estimated length of stay     Answer:   More than 2 Midnights     Order Specific Question:   Certification     Answer:   I certify that inpatient services are medically necessary for this patient for a duration of greater than two midnights  See H&P and MD Progress Notes for additional information about the patient's course of treatment  No chief complaint on file  SOB      Assessment/Plan: 50 YO MALE PRESENTED TO [facility] FROM HOME VIA EMS  AS INPATIENT ADMISSION FOR ACUTE RESPIRATORY FAILURE  ON ARRIVAL TO [facility] PATIENT IN SEVERE DISTRESS AND PLACED ON BIPAP  NEB IV  NITRO STEROIDS AND DIURETICS  PATIENT WAS LIFE FLIGHTED TO [facility] FOR HIGHER LEVEL OF CARE  UPON ARRIVAL TO [facility] PATIENT HX LIVING  RELATED DONOR KIDNEY TRANSPLANT DM TYPE 1 AND STAGE 5 CKD WITH CREATININE BASE LINE 4 5 PATIENT HYPERTENSIVE AND FLASHED PULMONARY EDEMA PATIENT WEANED OFF BIPAP AND 4 PLACED ON 4 L NC   PLAN     Assessment/Plan:  1  Flash pulmonary edema secondary to hypertension  · Patient was initially treated with diuretics, nitroglycerin, and steroids  · Continue to treat hypertension  · Diuretics per Nephrology  2   Acute kidney in the setting of kidney transplant  · Consult Nephrology  · Monitor kidney indices  · May require hemodialysis  · Creatinine close to baseline of 4 5  · Strict I&O monitoring  · Continue anti-rejection drugs  3  Anasarca  · Diuretics per Nephrology  · No history of congestive heart failure, check echocardiogram  4  Type 1 diabetes  · Sliding scale insulin   · Monitor blood glucose  · Controlled carbohydrate diet  · Resume home insulin regimen at discharge cut  5  History of GI bleed ; Protonix daily  6   Status post right AKA in February of this year   Vitals   Temperature Pulse Respirations Blood Pressure SpO2   10/11/19 1155 10/11/19 1030 10/11/19 1100 10/11/19 1030 10/11/19 1100   99 °F (37 2 °C) 90 22 (!) 179/82 97 %      Temp Source Heart Rate Source Patient Position - Orthostatic VS BP Location FiO2 (%)   10/11/19 1155 -- 10/12/19 1611 10/12/19 1611 --   Temporal  Sitting Right arm       Pain Score       10/11/19 1007       No Pain        Wt Readings from Last 1 Encounters:   10/12/19 54 1 kg (119 lb 4 3 oz)     Additional Vital Signs:   10/12/19 0400  98 9 °F (37 2 °C)  66  20  149/82  116  100 %  --  --   10/12/19 0200  --  72  20  144/78  --  100 %  --  --   10/12/19 0018  --  80  15  139/66  96  99 %  --  --   10/11/19 2303  99 °F (37 2 °C)  --  --  --  --  --  --  --   10/11/19 2301  --  --  --  175/90Abnormal   --  --  --  --   10/11/19 2258  --  80  24Abnormal   163/82  112  94 %  --  --   10/11/19 2211  --  86  16  175/90Abnormal   129  94 %  --  --   10/11/19 2022  99 °F (37 2 °C)  --  --  --  --  --  --  --   10/11/19 2012  --  86  27Abnormal   173/90Abnormal   117  --  --  --   10/11/19 1900  --  84  27Abnormal   165/77  102  --  --  --   10/11/19 1800  --  80  17  164/80  127  --  --  --   10/11/19 1700  --  84  18  142/67  90  --  --  --   10/11/19 1649  98 6 °F (37 °C)  --  --  --  --  --  --  --   10/11/19 1600  --  90  20  180/85Abnormal   114  --  --  --   10/11/19 1500  --  92  18  178/80Abnormal   106  --  --  --   10/11/19 1302  --  95  --  184/81Abnormal   --  --  --  --   10/11/19 1200  --  92  25Abnormal   179/81Abnormal   102  99 %  --  --   10/11/19 1155  99 °F (37 2 °C)  --  --  --  --             Pertinent Labs/Diagnostic Test Results:   Results from last 7 days   Lab Units 10/12/19  0416 10/11/19  0346   WBC Thousand/uL 9 29 14 61*   HEMOGLOBIN g/dL 7 0* 8 1*   HEMATOCRIT % 22 7* 26 0*   PLATELETS Thousands/uL 261 436*   NEUTROS ABS Thousands/µL 6 74 9 82*         Results from last 7 days   Lab Units 10/12/19  0416 10/11/19  0346   SODIUM mmol/L 139 141   POTASSIUM mmol/L 4 3 5 2   CHLORIDE mmol/L 100 102   CO2 mmol/L 27 27   ANION GAP mmol/L 12 12   BUN mg/dL 110* 93*   CREATININE mg/dL 5 18* 4 76*   EGFR ml/min/1 73sq m 12 13   CALCIUM mg/dL 8 0* 8 0*   MAGNESIUM mg/dL  --  2 2   PHOSPHORUS mg/dL 7 0*  --      Results from last 7 days   Lab Units 10/11/19  0346   AST U/L 115*   ALT U/L 55   ALK PHOS U/L 161*   TOTAL PROTEIN g/dL 6 7   ALBUMIN g/dL 2 5*   TOTAL BILIRUBIN mg/dL 0 60     Results from last 7 days   Lab Units 10/12/19  1624 10/12/19  1230 10/12/19  0645 10/12/19  0430 10/11/19  2202 10/11/19  2156 10/11/19  1613 10/11/19  1159   POC GLUCOSE mg/dl 97 175* 124 204* 434* 439* 358* 267*     Results from last 7 days   Lab Units 10/12/19  0416 10/11/19  0346   GLUCOSE RANDOM mg/dL 191* 187*         Results from last 7 days   Lab Units 10/11/19  1346   HEMOGLOBIN A1C % 4 8   EAG mg/dl 91     BETA-HYDROXYBUTYRATE   Date Value Ref Range Status   12/19/2018 1 59 (H) 0 02 - 0 27 mmol/L Final          Results from last 7 days   Lab Units 10/11/19  0346   PH JAMES  7 349   PCO2 JAMES mm Hg 48 8   PO2 JAMES mm Hg 66 2*   HCO3 JAMES mmol/L 26 3   BASE EXC JAMES mmol/L 0 4   O2 CONTENT JAMES ml/dL 11 1   O2 HGB, VENOUS % 88 4*             Results from last 7 days   Lab Units 10/11/19  0346   TROPONIN I ng/mL 0 06*         Results from last 7 days   Lab Units 10/11/19  0346   PROTIME seconds 13 1   INR  0 99   PTT seconds 31             Results from last 7 days   Lab Units 10/11/19  0346   LACTIC ACID mmol/L 2 1*     Results from last 7 days   Lab Units 10/11/19  0346   NT-PRO BNP pg/mL 06,699*     Results from last 7 days   Lab Units 10/11/19  0747   CLARITY UA  Clear   COLOR UA  Yellow   SPEC GRAV UA  1 020   PH UA  7 5   GLUCOSE UA mg/dl 100 (1/10%)*   KETONES UA mg/dl Negative   BLOOD UA  Small*   PROTEIN UA mg/dl >=300*   NITRITE UA  Negative   BILIRUBIN UA  Negative   UROBILINOGEN UA E U /dl 0 2   LEUKOCYTES UA  Negative   WBC UA /hpf 1-2*   RBC UA /hpf 4-10*   BACTERIA UA /hpf Occasional   EPITHELIAL CELLS WET PREP /hpf None Seen     Results from last 7 days   Lab Units 10/11/19  0346   BLOOD CULTURE  No Growth at 24 hrs  CXR 10-11-19  1  Large increased opacity overlying the right lower lung field compatible with pneumonia in the appropriate clinical setting     2   Cardiomegaly and pulmonary vascular congestion          EKG 10-11-19  Comparison to cardiac monitor: Yes    Interpretation:     Interpretation: abnormal    Rate:     ECG rate:  103    ECG rate assessment: tachycardic    Rhythm:     Rhythm: sinus tachycardia    Ectopy:     Ectopy: none    QRS:     QRS axis:  Normal    QRS intervals:  Normal  Conduction:     Conduction: normal    ST segments:     ST segments:  Abnormal    Depression:  V4, V5 and V6        Past Medical History:   Diagnosis Date    Bacteremia 12/21/2018    Cardiac arrest ([facility] )     Diabetes mellitus ([place] [address] )     Diabetes mellitus type 1 ([place] [address] )     GI bleed     Hypertension     Infection at site of external fixator pin ([facility] )     MI (myocardial infarction) ([place] [address] )     Pneumonia     Last Assessed [id number]    Renal failure     Renal transplant, status post 07/21/2007     Present on Admission:   Stage 4 chronic kidney disease ([facility] )   Acute respiratory failure ([facility] )      Admitting Diagnosis: Pulmonary edema [J81 1]  Age/Sex: 51 y o  male  Admission Orders:    Medications:  [MAR Hold] atorvastatin 80 mg Oral After Dinner   [MAR Hold] azaTHIOprine 50 mg Oral Daily   [MAR Hold] calcitriol 1 mcg Oral Daily   [MAR Hold] heparin (porcine) 5,000 Units Subcutaneous Q8H [address]   [MAR Hold] insulin detemir 6 Units Subcutaneous QAM   [MAR Hold] insulin detemir 7 Units Subcutaneous HS   [MAR Hold] insulin lispro 2-12 Units Subcutaneous TID With Meals   [MAR Hold] metoprolol succinate 50 mg Oral Daily   [MAR Hold] NIFEdipine 30 mg Oral Daily   [MAR Hold] pantoprazole 40 mg Intravenous Q24H [address]   [MAR Hold] predniSONE 5 mg Oral Daily   [MAR Hold] sevelamer 800 mg Oral TID With Meals   [MAR Hold] tacrolimus 2 mg Oral QPM   [MAR Hold] tacrolimus 3 mg Oral Daily   [MAR Hold] torsemide 20 mg Oral Daily          [MAR Hold] hydrALAZINE 10 mg Intravenous Q6H PRN   [MAR Hold] levalbuterol 1 25 mg Nebulization Q8H PRN       IP CONSULT TO NEPHROLOGY   TELEMETRY  SCD  CONTINUOUS CARDIO-PULMONARY MONITORING   BLOOD SUGARS AC AND HS      Network Utilization Review Department  Phone: [phone]; Fax [phone]  [email or web address]  ATTENTION: Please call with any questions or concerns to [phone]  and carefully listen to the prompts so that you are directed to the right person  Send all requests for admission clinical reviews, approved or denied determinations and any other requests to fax [phone]   All voicemails are confidential

## 2019-10-12 NOTE — PROGRESS NOTES
Progress Note - Critical Care   Vianey Arcos 51 y o  male MRN: [id number]  Unit/Bed#: ICU 13 Encounter: [id number]    Assessment/Plan:  1  Acute Hypoxic Respiratory Failure likely secondary to Flash Pulmonary Edema in the setting of HTN  · Continue diuresis with IV lasix 40mg TID per nephrology  · Continue home anti-hypertensives and PRN hydralazine   · Continue to wean supplemental O2 for SpO2>90  · Encourage cough, deep breathing, IS  2  MIKE on CKD stage 5 with history of Kidney Transplant  · Baseline Cr 4 5, appears close to baseline - currently 4 76   · Continue IV lasix with goal negative fluid balance of 1L/24 hours  · Per nephrology, is agreeable to IHD if necessary   · Continue to monitor I/O and BUN/Cr closely  3  Pulmonary HTN  · ECHO from yesterday shows EF 52% with mild hypokinesis of basal inferior wall and moderate hypokinesis of basal-mid inferoseptal wall  Peak PA pressure found to be 61 mmHg suggestive of moderate to severe Pulmonary HTN  · Continue IV Lasix   4  History of Kidney Transplant [facility] in 1998 and 2001  · Continue tacrolimus, prednisone, and imuran   5  Hypertension  · Continue home Metoprolol Succinate  · Restart home Procardia  · Continue PRN Hydralazine for SBP>160  6  History of GIB  · Continue Protonix  7  History of R AKA February 2019  · PT/OT, encourage ambulation       Critical Care Time: 32 minutes  Documented critical care time excludes any procedures documented elsewhere  It also excludes any family updates    _____________________________________________________________________    HPI/24hr events: The patient was admitted with shortness of breath, lower extremity edema, and HTN  He received Nitroglycerin, IV steroids, and IV lasix  His symptoms improved with diuresis  He remained stable overnight on 4L NC       Medications:    Current Facility-Administered Medications:  atorvastatin 80 mg Oral After Jw Brought, CRNP   azaTHIOprine 50 mg Oral Daily Deepti Crooks, CRNP   calcitriol 1 mcg Oral Once per day on Mon Wed Fri Deepti Crooks, CRNP   furosemide 40 mg Intravenous TID (diuretic) Claudene Spates,    heparin (porcine) 5,000 Units Subcutaneous [facility] Deepti Crooks, CRNP   hydrALAZINE 10 mg Intravenous Q6H PRN Deepti Valeriy, CRNP   insulin detemir 6 Units Subcutaneous QAM Clemencia Frank, CRNP   insulin detemir 7 Units Subcutaneous HS Clemencia Frank, CRNP   insulin lispro 2-12 Units Subcutaneous TID With Meals Deepti Crooks, CRNP   levalbuterol 1 25 mg Nebulization Q8H PRN [facility] Valeriy, CRNP   metoprolol succinate 50 mg Oral Daily Clemencia Zac, CRNP   NIFEdipine 30 mg Oral Daily [facility], CRNP   pantoprazole 40 mg Intravenous Q24H [facility] Valeriy, CRNP   predniSONE 5 mg Oral Daily [facility] Draft, CRNP   tacrolimus 2 mg Oral QPM [facility] Valeriy, CRNP   tacrolimus 3 mg Oral Daily [facility] Valeriy, CRNP              Physical exam:  Vitals: There is no height or weight on file to calculate BMI  Blood pressure 139/66, pulse 80, temperature 99 °F (37 2 °C), temperature source Temporal, resp  rate 15, SpO2 99 %  ,  Temp  Min: 96 7 °F (35 9 °C)  Max: 99 °F (37 2 °C)       SpO2: 99 %            Intake/Output Summary (Last 24 hours) at 10/12/2019 0038  Last data filed at 10/12/2019 0001  Gross per 24 hour   Intake 325 ml   Output 800 ml   Net -475 ml       Invasive/non-invasive ventilation settings:   Respiratory    Lab Data (Last 4 hours)    None         O2/Vent Data (Last 4 hours)    None              Invasive Devices     Peripheral Intravenous Line            Peripheral IV 10/11/19 Left Forearm less than 1 day    Peripheral IV 10/11/19 Right Wrist less than 1 day                  Physical Exam:  Gen: Alert, No distress, Not ill appearing  HEENT: Normocephallic, Atraumatic, PERRL, Oropharynx clear  Neck: Supple, trachea midline  Chest: S1/S2, NSR rate 80   No murmur, rub, gallop  Cor: Lungs clear bilaterally, no crackles or wheezes  Abd: soft, nontender, nondistended  Ext: +2 pulses in RUE, LUE, LLE, +2 R femoral pulse  +2 pitting edema in LLE  Neuro: Alert and Oriented x3, Equal strength in all extremities  Skin: Clean, Dry, Intact  Diagnostic Data:  Lab: I have personally reviewed pertinent lab results  CBC:   Results from last 7 days   Lab Units 10/11/19  0346   WBC Thousand/uL 14 61*   HEMOGLOBIN g/dL 8 1*   HEMATOCRIT % 26 0*   PLATELETS Thousands/uL 436*       CMP:   Results from last 7 days   Lab Units 10/11/19  0346   SODIUM mmol/L 141   POTASSIUM mmol/L 5 2   CHLORIDE mmol/L 102   CO2 mmol/L 27   BUN mg/dL 93*   CREATININE mg/dL 4 76*   CALCIUM mg/dL 8 0*   ALK PHOS U/L 161*   ALT U/L 55   AST U/L 115*     PT/INR:   Lab Results   Component Value Date    INR 0 99 10/11/2019   ,   Magnesium:   Results from last 7 days   Lab Units 10/11/19  0346   MAGNESIUM mg/dL 2 2     Phosphorous:       Microbiology:        Imaging:  CXR from admission showing pulmonary vascular congestion    Cardiac lab/EKG/telemetry/ECHO:   EKG: NSR rate 80    VTE Prophylaxis: Heparin    Code Status: Prior    GAVINO Aguilar    Portions of the record may have been created with voice recognition software  Occasional wrong word or "sound a like" substitutions may have occurred due to the inherent limitations of voice recognition software  Read the chart carefully and recognize, using context, where substitutions have occurred

## 2019-10-13 PROBLEM — N18.5 STAGE 5 CHRONIC KIDNEY DISEASE NOT ON CHRONIC DIALYSIS (HCC): Status: ACTIVE | Noted: 2018-12-20

## 2019-10-13 PROBLEM — I27.20 PULMONARY HYPERTENSION (HCC): Status: ACTIVE | Noted: 2019-10-13

## 2019-10-13 LAB
25(OH)D3 SERPL-MCNC: 19.9 NG/ML (ref 30–100)
ALBUMIN SERPL BCP-MCNC: 2.4 G/DL (ref 3.5–5)
ALP SERPL-CCNC: 96 U/L (ref 46–116)
ALT SERPL W P-5'-P-CCNC: 36 U/L (ref 12–78)
ANION GAP SERPL CALCULATED.3IONS-SCNC: 12 MMOL/L (ref 4–13)
AST SERPL W P-5'-P-CCNC: 53 U/L (ref 5–45)
BASOPHILS # BLD AUTO: 0.05 THOUSANDS/ΜL (ref 0–0.1)
BASOPHILS NFR BLD AUTO: 1 % (ref 0–1)
BILIRUB SERPL-MCNC: 0.42 MG/DL (ref 0.2–1)
BUN SERPL-MCNC: 120 MG/DL (ref 5–25)
CALCIUM SERPL-MCNC: 8.1 MG/DL (ref 8.3–10.1)
CHLORIDE SERPL-SCNC: 98 MMOL/L (ref 100–108)
CO2 SERPL-SCNC: 26 MMOL/L (ref 21–32)
CREAT SERPL-MCNC: 5.28 MG/DL (ref 0.6–1.3)
EOSINOPHIL # BLD AUTO: 0.25 THOUSAND/ΜL (ref 0–0.61)
EOSINOPHIL NFR BLD AUTO: 3 % (ref 0–6)
ERYTHROCYTE [DISTWIDTH] IN BLOOD BY AUTOMATED COUNT: 15.9 % (ref 11.6–15.1)
FERRITIN SERPL-MCNC: 452 NG/ML (ref 8–388)
GFR SERPL CREATININE-BSD FRML MDRD: 12 ML/MIN/1.73SQ M
GLUCOSE SERPL-MCNC: 119 MG/DL (ref 65–140)
GLUCOSE SERPL-MCNC: 152 MG/DL (ref 65–140)
GLUCOSE SERPL-MCNC: 167 MG/DL (ref 65–140)
GLUCOSE SERPL-MCNC: 169 MG/DL (ref 65–140)
GLUCOSE SERPL-MCNC: 220 MG/DL (ref 65–140)
HCT VFR BLD AUTO: 24.2 % (ref 36.5–49.3)
HGB BLD-MCNC: 7.5 G/DL (ref 12–17)
IMM GRANULOCYTES # BLD AUTO: 0.05 THOUSAND/UL (ref 0–0.2)
IMM GRANULOCYTES NFR BLD AUTO: 1 % (ref 0–2)
IRON SATN MFR SERPL: 29 %
IRON SERPL-MCNC: 92 UG/DL (ref 65–175)
LYMPHOCYTES # BLD AUTO: 2.62 THOUSANDS/ΜL (ref 0.6–4.47)
LYMPHOCYTES NFR BLD AUTO: 27 % (ref 14–44)
MCH RBC QN AUTO: 30.4 PG (ref 26.8–34.3)
MCHC RBC AUTO-ENTMCNC: 31 G/DL (ref 31.4–37.4)
MCV RBC AUTO: 98 FL (ref 82–98)
MONOCYTES # BLD AUTO: 0.65 THOUSAND/ΜL (ref 0.17–1.22)
MONOCYTES NFR BLD AUTO: 7 % (ref 4–12)
NEUTROPHILS # BLD AUTO: 6.23 THOUSANDS/ΜL (ref 1.85–7.62)
NEUTS SEG NFR BLD AUTO: 61 % (ref 43–75)
NRBC BLD AUTO-RTO: 0 /100 WBCS
PHOSPHATE SERPL-MCNC: 6.6 MG/DL (ref 2.7–4.5)
PLATELET # BLD AUTO: 334 THOUSANDS/UL (ref 149–390)
PMV BLD AUTO: 8.8 FL (ref 8.9–12.7)
POTASSIUM SERPL-SCNC: 4.5 MMOL/L (ref 3.5–5.3)
PROT SERPL-MCNC: 5.6 G/DL (ref 6.4–8.2)
RBC # BLD AUTO: 2.47 MILLION/UL (ref 3.88–5.62)
SODIUM SERPL-SCNC: 136 MMOL/L (ref 136–145)
TACROLIMUS BLD-MCNC: 4 NG/ML (ref 2–20)
TIBC SERPL-MCNC: 322 UG/DL (ref 250–450)
WBC # BLD AUTO: 9.85 THOUSAND/UL (ref 4.31–10.16)

## 2019-10-13 PROCEDURE — 82948 REAGENT STRIP/BLOOD GLUCOSE: CPT

## 2019-10-13 PROCEDURE — 99232 SBSQ HOSP IP/OBS MODERATE 35: CPT | Performed by: INTERNAL MEDICINE

## 2019-10-13 PROCEDURE — 85025 COMPLETE CBC W/AUTO DIFF WBC: CPT | Performed by: NURSE PRACTITIONER

## 2019-10-13 PROCEDURE — 84100 ASSAY OF PHOSPHORUS: CPT | Performed by: NURSE PRACTITIONER

## 2019-10-13 PROCEDURE — 83540 ASSAY OF IRON: CPT | Performed by: NURSE PRACTITIONER

## 2019-10-13 PROCEDURE — 83550 IRON BINDING TEST: CPT | Performed by: NURSE PRACTITIONER

## 2019-10-13 PROCEDURE — 82728 ASSAY OF FERRITIN: CPT | Performed by: NURSE PRACTITIONER

## 2019-10-13 PROCEDURE — 99232 SBSQ HOSP IP/OBS MODERATE 35: CPT | Performed by: PHYSICIAN ASSISTANT

## 2019-10-13 PROCEDURE — 80197 ASSAY OF TACROLIMUS: CPT | Performed by: NURSE PRACTITIONER

## 2019-10-13 PROCEDURE — 82306 VITAMIN D 25 HYDROXY: CPT | Performed by: NURSE PRACTITIONER

## 2019-10-13 PROCEDURE — 80053 COMPREHEN METABOLIC PANEL: CPT | Performed by: NURSE PRACTITIONER

## 2019-10-13 RX ORDER — HYDRALAZINE HYDROCHLORIDE 20 MG/ML
10 INJECTION INTRAMUSCULAR; INTRAVENOUS EVERY 6 HOURS PRN
Status: DISCONTINUED | OUTPATIENT
Start: 2019-10-13 | End: 2019-10-13

## 2019-10-13 RX ORDER — CALCITRIOL 0.25 UG/1
1.5 CAPSULE, LIQUID FILLED ORAL
Status: DISCONTINUED | OUTPATIENT
Start: 2019-10-14 | End: 2019-10-13

## 2019-10-13 RX ORDER — HYDRALAZINE HYDROCHLORIDE 20 MG/ML
10 INJECTION INTRAMUSCULAR; INTRAVENOUS EVERY 6 HOURS PRN
Status: DISCONTINUED | OUTPATIENT
Start: 2019-10-13 | End: 2019-10-15 | Stop reason: HOSPADM

## 2019-10-13 RX ORDER — MELATONIN
1000 DAILY
Status: DISCONTINUED | OUTPATIENT
Start: 2019-10-14 | End: 2019-10-15 | Stop reason: HOSPADM

## 2019-10-13 RX ORDER — CALCITRIOL 0.25 UG/1
1 CAPSULE, LIQUID FILLED ORAL DAILY
Status: DISCONTINUED | OUTPATIENT
Start: 2019-10-14 | End: 2019-10-15 | Stop reason: HOSPADM

## 2019-10-13 RX ORDER — TAMSULOSIN HYDROCHLORIDE 0.4 MG/1
0.4 CAPSULE ORAL
Status: DISCONTINUED | OUTPATIENT
Start: 2019-10-13 | End: 2019-10-15 | Stop reason: HOSPADM

## 2019-10-13 RX ORDER — TORSEMIDE 20 MG/1
20 TABLET ORAL
Status: DISCONTINUED | OUTPATIENT
Start: 2019-10-13 | End: 2019-10-15 | Stop reason: HOSPADM

## 2019-10-13 RX ADMIN — TORSEMIDE 20 MG: 20 TABLET ORAL at 16:42

## 2019-10-13 RX ADMIN — INSULIN LISPRO 4 UNITS: 100 INJECTION, SOLUTION INTRAVENOUS; SUBCUTANEOUS at 13:00

## 2019-10-13 RX ADMIN — HEPARIN SODIUM 5000 UNITS: 5000 INJECTION INTRAVENOUS; SUBCUTANEOUS at 06:22

## 2019-10-13 RX ADMIN — TAMSULOSIN HYDROCHLORIDE 0.4 MG: 0.4 CAPSULE ORAL at 16:42

## 2019-10-13 RX ADMIN — NIFEDIPINE 30 MG: 30 TABLET, FILM COATED, EXTENDED RELEASE ORAL at 09:27

## 2019-10-13 RX ADMIN — HEPARIN SODIUM 5000 UNITS: 5000 INJECTION INTRAVENOUS; SUBCUTANEOUS at 13:52

## 2019-10-13 RX ADMIN — SEVELAMER HYDROCHLORIDE 800 MG: 800 TABLET, FILM COATED PARENTERAL at 13:00

## 2019-10-13 RX ADMIN — TORSEMIDE 20 MG: 20 TABLET ORAL at 09:27

## 2019-10-13 RX ADMIN — HEPARIN SODIUM 5000 UNITS: 5000 INJECTION INTRAVENOUS; SUBCUTANEOUS at 21:00

## 2019-10-13 RX ADMIN — AZATHIOPRINE 50 MG: 50 TABLET ORAL at 09:13

## 2019-10-13 RX ADMIN — TACROLIMUS 3 MG: 1 CAPSULE ORAL at 09:13

## 2019-10-13 RX ADMIN — ATORVASTATIN CALCIUM 80 MG: 80 TABLET, FILM COATED ORAL at 16:42

## 2019-10-13 RX ADMIN — INSULIN DETEMIR 6 UNITS: 100 INJECTION, SOLUTION SUBCUTANEOUS at 09:12

## 2019-10-13 RX ADMIN — PREDNISONE 5 MG: 2.5 TABLET ORAL at 09:12

## 2019-10-13 RX ADMIN — INSULIN DETEMIR 7 UNITS: 100 INJECTION, SOLUTION SUBCUTANEOUS at 21:01

## 2019-10-13 RX ADMIN — SEVELAMER HYDROCHLORIDE 800 MG: 800 TABLET, FILM COATED PARENTERAL at 16:42

## 2019-10-13 RX ADMIN — SEVELAMER HYDROCHLORIDE 800 MG: 800 TABLET, FILM COATED PARENTERAL at 09:00

## 2019-10-13 RX ADMIN — PANTOPRAZOLE SODIUM 40 MG: 40 TABLET, DELAYED RELEASE ORAL at 09:12

## 2019-10-13 RX ADMIN — TACROLIMUS 2 MG: 1 CAPSULE ORAL at 20:53

## 2019-10-13 RX ADMIN — METOPROLOL SUCCINATE 50 MG: 50 TABLET, EXTENDED RELEASE ORAL at 09:12

## 2019-10-13 NOTE — PROGRESS NOTES
Progress Note - Morales Darby 1970, 51 y o  male MRN: [id number]  Unit/Bed#: E5 -01 Encounter: [id number]  Primary Care Provider: Gonsalo Kelly DO   Date and time admitted to hospital: 10/11/2019 10:53 AM    * Acute respiratory failure ([facility] )  Assessment & Plan  · Now resolved  · Patient is recovering from flash pulmonary edema, stepped down from ICU  · This was likely 2/2 hypertension and volume overload  · Echo EF 52%, moderate hypokinesis, peak PA pressure 61 mm Hg signifying moderate to severe pulmonary hypertension  · Resolved with IV Lasix, now transitioned to p o  Diuretic per Nephrology  · Patient successfully weaned from O2  · No further respiratory complaints at this time    [facility] ([facility] )  Assessment & Plan  · Patient recovering from flash pulmonary edema secondary to hypertension and fluid overload  · Echocardiogram done this admission shows EF 52%, moderate diffuse hypokinesis, as well as peak PA pressure 61 mmHg  · Continue Lasix per Nephrology recommendations    Stage 5 chronic kidney disease not on chronic dialysis [facility])  Assessment & Plan  · History of renal transplant 1998 and 2001 with [facility] [place]  · Per Nephro, patient's baseline appears 4 5  His immunosuppressive therapy is tacrolimus, azathioprine, prednisone 5 mg  · He will continue to follow with Nephrology as an outpatient  Acute kidney injury [facility])  Assessment & Plan  · MIKE POA with CKD stage 5 and history of kidney transplant  · Patient is near baseline  · He is agreeable to renal replacement therapy if needed      Type 1 diabetes mellitus [facility])  Assessment & Plan  Lab Results   Component Value Date    HGBA1C 4 8 10/11/2019       Recent Labs     10/12/19  1624 10/12/19  2038 10/13/19  0712 10/13/19  1112   POCGLU 97 223* 169* 220*       Blood Sugar Average: Last 72 hrs:  (P) 244 3   Continue insulin and Accu-Cheks    S/P AKA (above knee amputation), right [facility])  Assessment & Plan  Patient declines PT eval at this time  Urinary retention  Assessment & Plan  · Patient with good urine output however  - question patient's baseline  · Added Flomax 10/13/2019  · Continue bladder scans Q shift  · Patient denies urinary complaints    Hypertension  Assessment & Plan  · Patient's regimen is metoprolol 50 mg daily, Procardia 30 mg daily  · We are also providing p r n  Hydralazine here  · Goal is to avoid blood pressure extremes  Patient was hypertensive this a m  and required p r n   · Hold parameters for SBP less than 130      VTE Pharmacologic Prophylaxis:   Pharmacologic: Heparin  Mechanical VTE Prophylaxis in Place: Yes    Patient Centered Rounds: I have performed bedside rounds with nursing staff today  Discussions with Specialists or Other Care Team Provider:  Discussed with Nephrology, nursing    Education and Discussions with Family / Patient:  Discussed with patient  Time Spent for Care: 30 minutes  More than 50% of total time spent on counseling and coordination of care as described above  Current Length of Stay: 2 day(s)    Current Patient Status: Inpatient   Certification Statement: The patient will continue to require additional inpatient hospital stay due to Monitoring respiratory status and renal trend given change of diuretics  Recovering from pulmonary edema  Discharge Plan:  Likely tomorrow if medically appropriate    Code Status: Prior      Subjective:   Overall, patient feels improved  He would be eager for discharge however reluctantly agreeable to stay  Denies urinary complaints  Objective:     Vitals:   Temp (24hrs), Av 2 °F (36 8 °C), Min:97 2 °F (36 2 °C), Max:99 3 °F (37 4 °C)    Temp:  [97 2 °F (36 2 °C)-99 3 °F (37 4 °C)] 99 3 °F (37 4 °C)  HR:  [72-81] 75  Resp:  [18] 18  BP: (138-200)/(72-89) 151/72  SpO2:  [94 %-97 %] 96 %  Body mass index is 23 29 kg/m²  Input and Output Summary (last 24 hours):        Intake/Output Summary (Last 24 hours) at 10/13/2019 1600  Last data filed at 10/13/2019 1429  Gross per 24 hour   Intake 360 ml   Output 950 ml   Net -590 ml       Physical Exam:     Physical Exam   Constitutional: He appears well-developed and well-nourished  No distress  HENT:   Head: Normocephalic and atraumatic  Mouth/Throat: No oropharyngeal exudate  Eyes: Right eye exhibits no discharge  Left eye exhibits no discharge  No scleral icterus  Neck: No JVD present  No tracheal deviation present  No thyromegaly present  Cardiovascular: Regular rhythm  Exam reveals no gallop and no friction rub  No murmur heard  Pulmonary/Chest: Effort normal  No respiratory distress  He has no wheezes  He has rales  He exhibits no tenderness (Slight)  Abdominal: Soft  Bowel sounds are normal  He exhibits no distension  There is no tenderness  There is no rebound  Musculoskeletal: He exhibits deformity  He exhibits no edema or tenderness  Skin: Skin is warm and dry  He is not diaphoretic  No erythema  No pallor  Psychiatric: He has a normal mood and affect   His behavior is normal        Additional Data:     Labs:    Results from last 7 days   Lab Units 10/13/19  0625   WBC Thousand/uL 9 85   HEMOGLOBIN g/dL 7 5*   HEMATOCRIT % 24 2*   PLATELETS Thousands/uL 334   NEUTROS PCT % 61   LYMPHS PCT % 27   MONOS PCT % 7   EOS PCT % 3     Results from last 7 days   Lab Units 10/13/19  0625   SODIUM mmol/L 136   POTASSIUM mmol/L 4 5   CHLORIDE mmol/L 98*   CO2 mmol/L 26   BUN mg/dL 120*   CREATININE mg/dL 5 28*   ANION GAP mmol/L 12   CALCIUM mg/dL 8 1*   ALBUMIN g/dL 2 4*   TOTAL BILIRUBIN mg/dL 0 42   ALK PHOS U/L 96   ALT U/L 36   AST U/L 53*   GLUCOSE RANDOM mg/dL 167*     Results from last 7 days   Lab Units 10/11/19  0346   INR  0 99     Results from last 7 days   Lab Units 10/13/19  1112 10/13/19  0712 10/12/19  2038 10/12/19  1624 10/12/19  1230 10/12/19  0645 10/12/19  0430 10/11/19  2202 10/11/19  2156 10/11/19  1613 10/11/19  1159   POC GLUCOSE mg/dl 220* 169* 223* 97 175* 124 204* 434* 439* 358* 267*     Results from last 7 days   Lab Units 10/11/19  1346   HEMOGLOBIN A1C % 4 8     Results from last 7 days   Lab Units 10/11/19  0346   LACTIC ACID mmol/L 2 1*           * I Have Reviewed All Lab Data Listed Above  * Additional Pertinent Lab Tests Reviewed: All Labs Within Last 24 Hours Reviewed      Recent Cultures (last 7 days):     Results from last 7 days   Lab Units 10/11/19  0346   BLOOD CULTURE  No Growth at 48 hrs  Last 24 Hours Medication List:     Current Facility-Administered Medications:  atorvastatin 80 mg Oral After Qasim Best, CRTANNER   azaTHIOprine 50 mg Oral Daily Mauri Score, CRNP   [START ON 10/14/2019] calcitriol 1 5 mcg Oral Once per day on Mon Wed Fri Jackie Angulo, GAVINO   heparin (porcine) 5,000 Units Subcutaneous [facility] Mauri Score, CRNP   hydrALAZINE 10 mg Intravenous Q6H PRN Ashely Estevez PA-C   insulin detemir 6 Units Subcutaneous QAM Mauri Score, CRNP   insulin detemir 7 Units Subcutaneous HS Mauri Score, CRNP   insulin lispro 2-12 Units Subcutaneous TID With Meals Mauri Score, CRNP   levalbuterol 1 25 mg Nebulization Q8H PRN Mauri Score, CRNP   metoprolol succinate 50 mg Oral Daily Mauri Score, CRNP   NIFEdipine 30 mg Oral Daily Mauri Score, CRNP   pantoprazole 40 mg Oral [employer], DO   predniSONE 5 mg Oral Daily Mauri Score, CRNP   sevelamer 800 mg Oral TID With Meals Mauri Score, CRNP   tacrolimus 2 mg Oral QPM Mauri Score, CRNP   tacrolimus 3 mg Oral Daily Mauri Score, CRNP   tamsulosin 0 4 mg Oral Daily With Ana Metzger PA-C   torsemide 20 mg Oral Daily Mauri Score, CRNP        Today, Patient Was Seen By: Kevin Cervnates PA-C    ** Please Note: Dictation voice to text software may have been used in the creation of this document   **

## 2019-10-13 NOTE — ASSESSMENT & PLAN NOTE
Lab Results   Component Value Date    HGBA1C 4 8 10/11/2019       Recent Labs     10/12/19  1624 10/12/19  2038 10/13/19  0712 10/13/19  1112   POCGLU 97 223* 169* 220*       Blood Sugar Average: Last 72 hrs:  (P) 244 3   Continue insulin and Accu-Cheks

## 2019-10-13 NOTE — ASSESSMENT & PLAN NOTE
· Patient with good urine output however  - question patient's baseline  · Added Flomax 10/13/2019  · Continue bladder scans Q shift  · Patient denies urinary complaints

## 2019-10-13 NOTE — ASSESSMENT & PLAN NOTE
· History of renal transplant 1998 and 2001 with [facility] [place]  · Per Nephro, patient's baseline appears 4 5  His immunosuppressive therapy is tacrolimus, azathioprine, prednisone 5 mg  · He will continue to follow with Nephrology as an outpatient

## 2019-10-13 NOTE — ASSESSMENT & PLAN NOTE
· Patient recovering from flash pulmonary edema secondary to hypertension and fluid overload  · Echocardiogram done this admission shows EF 52%, moderate diffuse hypokinesis, as well as peak PA pressure 61 mmHg    · Continue Lasix per Nephrology recommendations

## 2019-10-13 NOTE — ASSESSMENT & PLAN NOTE
· Patient's regimen is metoprolol 50 mg daily, Procardia 30 mg daily  · We are also providing p r n  Hydralazine here  · Goal is to avoid blood pressure extremes    Patient was hypertensive this a m  and required p r n   · Hold parameters for SBP less than 130

## 2019-10-13 NOTE — ASSESSMENT & PLAN NOTE
· Now resolved  · Patient is recovering from flash pulmonary edema, stepped down from ICU  · This was likely 2/2 hypertension and volume overload  · Echo EF 52%, moderate hypokinesis, peak PA pressure 61 mm Hg signifying moderate to severe pulmonary hypertension  · Resolved with IV Lasix, now transitioned to p o   Diuretic per Nephrology  · Patient successfully weaned from O2  · No further respiratory complaints at this time

## 2019-10-13 NOTE — PROGRESS NOTES
NEPHROLOGY PROGRESS NOTE   Yefri Kaiser 51 y o  male MRN: [id number]  Unit/Bed#: E5 -01 Encounter: [id number]  Reason for Consult: MIKE (POA) on CKD V    ASSESSMENT/PLAN:  MIKE:  Recent hospital admission with transfer to [facility] with creatinine of 6 5   -presented with sCr of 5 5, improved to 4 7 and again slowly increasing     - changed from IV Lasix to PO Torsemide  - increase torsemide 20 mg BID    -UA:  Glucose, small blood, greater than 300 protein, 4-10 RBCs, 1-2 WBCs  - PVR: 137  Continue to check Q shift with urinary retention protocol    - good UOP  - no urgent indication for dialysis  -would like to pursue RRT needed   -continue to avoid nephrotoxins  -strict I/O      Access: none       CKD V:  With history of living related donor renal transplant in 1998 and in 2001 at [facility]   -baseline creatinine around 4 5    -immunosuppressive:  Tacrolimus 3 mg am/2 mg pm, azathioprine 50 mg po daily, prednisone 5 mg daily   -tac level: 4 0       Flash pulmonary edema/pulmonary HTN:  In the setting of persistent hypertension  Respiratory status is improving   -chest x-ray:  Probable pneumonia, cardiomegaly, and pulmonary vascular congestion  -echocardiogram shows EF of 52%  -S/P diuresis with IV Lasix  - continues on PO bumex      Hypertension: blood pressure elevated this morning x 1   -optimize hemodynamics, maintain MAP >65 mmHg   -avoid hypotension or high fluctuations in blood pressure   -continue metoprolol 50 mg daily, Procardia 30 mg daily, and p r n  Hydralazine   - holding parameters on antihypertensive for systolic blood pressure less than 130     Hyperphosphatemia: improved to 6 6, still elevated  Likely due to decreased clearance in the setting of renal failure  - low phos diet  - started on phos binder 10/12   - continue to monitor       MBD in CKD:  -continues on calcitriol, increased to 1 5 mcg 3 times weekly  - renal diet    -PTH level elevated at 450 0   - Vit D 25: pending    - started on phos binder       Anemia: Hgb remains low  - continue to monitor and transfuse as needed   -would recommend leuko reduced, irradiated blood in the setting of transplant   -consider CAPRICE  -iron panel pending  SUBJECTIVE:  The patient is resting sitting up in bed with his wife at the bedside  He denies chest pain or shortness of breath  He denies nausea, vomiting, diarrhea  He states that he is urinating without difficulty  However he does exhibit rales on exam with lower extremity edema  He also had bladder scan of 120 cc yesterday      OBJECTIVE:  Current Weight: Weight - Scale: 54 1 kg (119 lb 4 3 oz)  Vitals:    10/12/19 1611 10/12/19 2346 10/13/19 0706 10/13/19 0713   BP: 154/72 138/75 (!) 200/89 (!) 190/86   BP Location: Right arm Left arm Left arm Left arm   Pulse: 72 75 81    Resp: 18 18 18    Temp: (!) 97 2 °F (36 2 °C) 97 5 °F (36 4 °C) 98 7 °F (37 1 °C)    TempSrc: Temporal Temporal Temporal    SpO2: 96% 94% 97%    Weight:       Height:           Intake/Output Summary (Last 24 hours) at 10/13/2019 0857  Last data filed at 10/13/2019 0844  Gross per 24 hour   Intake 360 ml   Output 1087 ml   Net -727 ml     General: No apparent distress  Skin: warm, dry, intact, no rash  HEENT: Moist mucous membranes, sclera anicteric, normocephalic  Neck: No apparent JVD appreciated  Chest: B/L rales, on RA  Heart: Regular rate and rhythm, No murmer  Abdomen: Soft, round, NT, +BS  Extremities:  B/L LE edema present  Neuro: Alert and oriented  Psych: Appropriate mood and affect    Medications:    Current Facility-Administered Medications:     atorvastatin (LIPITOR) tablet 80 mg, 80 mg, Oral, After GAVINO Gandhi, 80 mg at 10/12/19 1808    azaTHIOprine (IMURAN) tablet 50 mg, 50 mg, Oral, Daily, GAVINO Thompson, 50 mg at 10/12/19 8514    calcitriol (ROCALTROL) capsule 1 mcg, 1 mcg, Oral, Daily, GAVINO Thompson    heparin (porcine) subcutaneous injection 5,000 Units, 5,000 Units, Subcutaneous, Q8H [address], Cyndra Jon, CRNP, 5,000 Units at 10/13/19 0622    hydrALAZINE (APRESOLINE) injection 10 mg, 10 mg, Intravenous, Q6H PRN, Ashely Estevez PA-C    insulin detemir (LEVEMIR) subcutaneous injection 6 Units, 6 Units, Subcutaneous, QAM, Honey Webb, CRNP, 6 Units at 10/12/19 0858    insulin detemir (LEVEMIR) subcutaneous injection 7 Units, 7 Units, Subcutaneous, HS, Honey Webb, CRNP, 7 Units at 10/12/19 2241    insulin lispro (HumaLOG) 100 units/mL subcutaneous injection 2-12 Units, 2-12 Units, Subcutaneous, TID With Meals, 2 Units at 10/12/19 1234 **AND** Fingerstick Glucose (POCT), , , TID AC, GAVINO Martinez    levalbuterol [facility]) inhalation solution 1 25 mg, 1 25 mg, Nebulization, Q8H PRN, BERLIN MartinezNP    metoprolol succinate (TOPROL-XL) 24 hr tablet 50 mg, 50 mg, Oral, Daily, Titadra Jon, CRNP, 50 mg at 10/12/19 1368    NIFEdipine (PROCARDIA XL) 24 hr tablet 30 mg, 30 mg, Oral, Daily, Honey Webb, CRNP    pantoprazole (PROTONIX) EC tablet 40 mg, 40 mg, Oral, [place], [place],     predniSONE tablet 5 mg, 5 mg, Oral, Daily, Titadra Jon, CRNP, 5 mg at 10/12/19 5565    sevelamer (RENAGEL) tablet 800 mg, 800 mg, Oral, TID With Meals, Honey Webb, BERLINNP    tacrolimus (PROGRAF) capsule 2 mg, 2 mg, Oral, QPM, Titadra Jon, CRNP, 2 mg at 10/12/19 2241    tacrolimus (PROGRAF) capsule 3 mg, 3 mg, Oral, Daily, Honey Webb, CRNP, 3 mg at 10/12/19 0829    torsemide (DEMADEX) tablet 20 mg, 20 mg, Oral, Daily, Honey Webb, CRTANNER    Laboratory Results:  Results from last 7 days   Lab Units 10/13/19  0625 10/12/19  0416 10/11/19  0346   WBC Thousand/uL 9 85 9 29 14 61*   HEMOGLOBIN g/dL 7 5* 7 0* 8 1*   HEMATOCRIT % 24 2* 22 7* 26 0*   PLATELETS Thousands/uL 334 261 436*   POTASSIUM mmol/L 4 5 4 3 5 2   CHLORIDE mmol/L 98* 100 102   CO2 mmol/L 26 27 27   BUN mg/dL 120* 110* 93*   CREATININE mg/dL 5 28* 5 18* 4 76*   CALCIUM mg/dL 8 1* 8 0* 8 0*   MAGNESIUM mg/dL  --   --  2 2   PHOSPHORUS mg/dL 6 6* 7 0*  --

## 2019-10-13 NOTE — ASSESSMENT & PLAN NOTE
· MIKE POA with CKD stage 5 and history of kidney transplant  · Patient is near baseline  · He is agreeable to renal replacement therapy if needed

## 2019-10-13 NOTE — PROGRESS NOTES
The pantoprazole IV has / have been converted to Oral per [facility] IV-to-PO Auto-Conversion Protocol for Adults as approved by the Pharmacy and Therapeutics Committee  The patient met all eligible criteria:  1) Age => 24years old   2) Received at least one dose of the IV form   3) Receiving at least one other scheduled oral/enteral medication   4) Tolerating an oral/enteral diet   and did not have any exclusions:   1) Critical care patient   2) Active GI bleed (IF assessing H2RAs or PPIs)   3) Continuous tube feeding (IF assessing cipro, doxycycline, levofloxacin, minocycline, rifampin, or voriconazole)   4) Receiving PO vancomycin (IF assessing metronidazole)   5) Persistent nausea and/or vomiting   6) Ileus or gastrointestinal obstruction   7) Terence/nasogastric tube set for continuous suction   8) Specific order not to automatically convert to PO (in the order's comments or if discussed in the most recent Infectious Disease or primary team's progress notes)

## 2019-10-14 ENCOUNTER — APPOINTMENT (INPATIENT)
Dept: ULTRASOUND IMAGING | Facility: MEDICAL CENTER | Age: 49
DRG: 189 | End: 2019-10-14
Payer: COMMERCIAL

## 2019-10-14 ENCOUNTER — APPOINTMENT (INPATIENT)
Dept: ULTRASOUND IMAGING | Facility: HOSPITAL | Age: 49
DRG: 189 | End: 2019-10-14
Payer: COMMERCIAL

## 2019-10-14 PROBLEM — D64.9 ANEMIA: Status: ACTIVE | Noted: 2019-10-14

## 2019-10-14 LAB
ABO GROUP BLD BPU: NORMAL
ABO GROUP BLD: NORMAL
ALBUMIN SERPL BCP-MCNC: 2.1 G/DL (ref 3.5–5)
ALP SERPL-CCNC: 81 U/L (ref 46–116)
ALT SERPL W P-5'-P-CCNC: 27 U/L (ref 12–78)
ANION GAP SERPL CALCULATED.3IONS-SCNC: 13 MMOL/L (ref 4–13)
AST SERPL W P-5'-P-CCNC: 44 U/L (ref 5–45)
ATRIAL RATE: 91 BPM
ATRIAL RATE: 92 BPM
BASOPHILS # BLD AUTO: 0.05 THOUSANDS/ΜL (ref 0–0.1)
BASOPHILS NFR BLD AUTO: 1 % (ref 0–1)
BILIRUB SERPL-MCNC: 0.36 MG/DL (ref 0.2–1)
BLD GP AB SCN SERPL QL: NEGATIVE
BPU ID: NORMAL
BUN SERPL-MCNC: 126 MG/DL (ref 5–25)
CALCIUM SERPL-MCNC: 7.9 MG/DL (ref 8.3–10.1)
CHLORIDE SERPL-SCNC: 101 MMOL/L (ref 100–108)
CO2 SERPL-SCNC: 24 MMOL/L (ref 21–32)
CREAT SERPL-MCNC: 5.39 MG/DL (ref 0.6–1.3)
CROSSMATCH: NORMAL
EOSINOPHIL # BLD AUTO: 0.21 THOUSAND/ΜL (ref 0–0.61)
EOSINOPHIL NFR BLD AUTO: 2 % (ref 0–6)
ERYTHROCYTE [DISTWIDTH] IN BLOOD BY AUTOMATED COUNT: 15.7 % (ref 11.6–15.1)
GFR SERPL CREATININE-BSD FRML MDRD: 11 ML/MIN/1.73SQ M
GLUCOSE SERPL-MCNC: 120 MG/DL (ref 65–140)
GLUCOSE SERPL-MCNC: 189 MG/DL (ref 65–140)
GLUCOSE SERPL-MCNC: 242 MG/DL (ref 65–140)
GLUCOSE SERPL-MCNC: 249 MG/DL (ref 65–140)
GLUCOSE SERPL-MCNC: 85 MG/DL (ref 65–140)
HCT VFR BLD AUTO: 21 % (ref 36.5–49.3)
HGB BLD-MCNC: 6.7 G/DL (ref 12–17)
IMM GRANULOCYTES # BLD AUTO: 0.15 THOUSAND/UL (ref 0–0.2)
IMM GRANULOCYTES NFR BLD AUTO: 2 % (ref 0–2)
LYMPHOCYTES # BLD AUTO: 2.78 THOUSANDS/ΜL (ref 0.6–4.47)
LYMPHOCYTES NFR BLD AUTO: 28 % (ref 14–44)
MCH RBC QN AUTO: 30.2 PG (ref 26.8–34.3)
MCHC RBC AUTO-ENTMCNC: 31.9 G/DL (ref 31.4–37.4)
MCV RBC AUTO: 95 FL (ref 82–98)
MONOCYTES # BLD AUTO: 0.8 THOUSAND/ΜL (ref 0.17–1.22)
MONOCYTES NFR BLD AUTO: 8 % (ref 4–12)
NEUTROPHILS # BLD AUTO: 5.97 THOUSANDS/ΜL (ref 1.85–7.62)
NEUTS SEG NFR BLD AUTO: 59 % (ref 43–75)
NRBC BLD AUTO-RTO: 0 /100 WBCS
P AXIS: 66 DEGREES
P AXIS: 72 DEGREES
PHOSPHATE SERPL-MCNC: 6.3 MG/DL (ref 2.7–4.5)
PLATELET # BLD AUTO: 307 THOUSANDS/UL (ref 149–390)
PMV BLD AUTO: 8.7 FL (ref 8.9–12.7)
POTASSIUM SERPL-SCNC: 4.1 MMOL/L (ref 3.5–5.3)
PR INTERVAL: 133 MS
PR INTERVAL: 150 MS
PROT SERPL-MCNC: 5.1 G/DL (ref 6.4–8.2)
QRS AXIS: 46 DEGREES
QRS AXIS: 48 DEGREES
QRSD INTERVAL: 88 MS
QRSD INTERVAL: 92 MS
QT INTERVAL: 396 MS
QT INTERVAL: 396 MS
QTC INTERVAL: 488 MS
QTC INTERVAL: 490 MS
RBC # BLD AUTO: 2.22 MILLION/UL (ref 3.88–5.62)
RH BLD: POSITIVE
SODIUM SERPL-SCNC: 138 MMOL/L (ref 136–145)
SPECIMEN EXPIRATION DATE: NORMAL
T WAVE AXIS: 90 DEGREES
T WAVE AXIS: 91 DEGREES
TACROLIMUS BLD-MCNC: 4.4 NG/ML (ref 2–20)
UNIT DISPENSE STATUS: NORMAL
UNIT PRODUCT CODE: NORMAL
UNIT RH: NORMAL
VENTRICULAR RATE: 91 BPM
VENTRICULAR RATE: 92 BPM
WBC # BLD AUTO: 9.96 THOUSAND/UL (ref 4.31–10.16)

## 2019-10-14 PROCEDURE — 84100 ASSAY OF PHOSPHORUS: CPT | Performed by: INTERNAL MEDICINE

## 2019-10-14 PROCEDURE — 80053 COMPREHEN METABOLIC PANEL: CPT | Performed by: INTERNAL MEDICINE

## 2019-10-14 PROCEDURE — 80197 ASSAY OF TACROLIMUS: CPT | Performed by: INTERNAL MEDICINE

## 2019-10-14 PROCEDURE — 86900 BLOOD TYPING SEROLOGIC ABO: CPT | Performed by: INTERNAL MEDICINE

## 2019-10-14 PROCEDURE — 86901 BLOOD TYPING SEROLOGIC RH(D): CPT | Performed by: INTERNAL MEDICINE

## 2019-10-14 PROCEDURE — 99233 SBSQ HOSP IP/OBS HIGH 50: CPT | Performed by: INTERNAL MEDICINE

## 2019-10-14 PROCEDURE — 86923 COMPATIBILITY TEST ELECTRIC: CPT

## 2019-10-14 PROCEDURE — 76770 US EXAM ABDO BACK WALL COMP: CPT

## 2019-10-14 PROCEDURE — 85025 COMPLETE CBC W/AUTO DIFF WBC: CPT | Performed by: INTERNAL MEDICINE

## 2019-10-14 PROCEDURE — 93010 ELECTROCARDIOGRAM REPORT: CPT | Performed by: INTERNAL MEDICINE

## 2019-10-14 PROCEDURE — 86850 RBC ANTIBODY SCREEN: CPT | Performed by: INTERNAL MEDICINE

## 2019-10-14 PROCEDURE — 99232 SBSQ HOSP IP/OBS MODERATE 35: CPT | Performed by: INTERNAL MEDICINE

## 2019-10-14 PROCEDURE — 30233N1 TRANSFUSION OF NONAUTOLOGOUS RED BLOOD CELLS INTO PERIPHERAL VEIN, PERCUTANEOUS APPROACH: ICD-10-PCS | Performed by: INTERNAL MEDICINE

## 2019-10-14 PROCEDURE — 82948 REAGENT STRIP/BLOOD GLUCOSE: CPT

## 2019-10-14 PROCEDURE — P9058 RBC, L/R, CMV-NEG, IRRAD: HCPCS

## 2019-10-14 RX ADMIN — ATORVASTATIN CALCIUM 80 MG: 80 TABLET, FILM COATED ORAL at 17:03

## 2019-10-14 RX ADMIN — INSULIN LISPRO 2 UNITS: 100 INJECTION, SOLUTION INTRAVENOUS; SUBCUTANEOUS at 16:59

## 2019-10-14 RX ADMIN — TAMSULOSIN HYDROCHLORIDE 0.4 MG: 0.4 CAPSULE ORAL at 16:59

## 2019-10-14 RX ADMIN — METOPROLOL SUCCINATE 50 MG: 50 TABLET, EXTENDED RELEASE ORAL at 09:52

## 2019-10-14 RX ADMIN — INSULIN DETEMIR 7 UNITS: 100 INJECTION, SOLUTION SUBCUTANEOUS at 21:23

## 2019-10-14 RX ADMIN — SEVELAMER HYDROCHLORIDE 800 MG: 800 TABLET, FILM COATED PARENTERAL at 16:59

## 2019-10-14 RX ADMIN — NIFEDIPINE 30 MG: 30 TABLET, FILM COATED, EXTENDED RELEASE ORAL at 09:52

## 2019-10-14 RX ADMIN — PANTOPRAZOLE SODIUM 40 MG: 40 TABLET, DELAYED RELEASE ORAL at 05:44

## 2019-10-14 RX ADMIN — TORSEMIDE 20 MG: 20 TABLET ORAL at 16:58

## 2019-10-14 RX ADMIN — SEVELAMER HYDROCHLORIDE 800 MG: 800 TABLET, FILM COATED PARENTERAL at 09:52

## 2019-10-14 RX ADMIN — CALCITRIOL CAPSULES 0.25 MCG 1 MCG: 0.25 CAPSULE ORAL at 09:53

## 2019-10-14 RX ADMIN — HEPARIN SODIUM 5000 UNITS: 5000 INJECTION INTRAVENOUS; SUBCUTANEOUS at 21:23

## 2019-10-14 RX ADMIN — INSULIN DETEMIR 6 UNITS: 100 INJECTION, SOLUTION SUBCUTANEOUS at 09:52

## 2019-10-14 RX ADMIN — SEVELAMER HYDROCHLORIDE 800 MG: 800 TABLET, FILM COATED PARENTERAL at 13:00

## 2019-10-14 RX ADMIN — TORSEMIDE 20 MG: 20 TABLET ORAL at 09:51

## 2019-10-14 RX ADMIN — MELATONIN 1000 UNITS: at 09:52

## 2019-10-14 RX ADMIN — TACROLIMUS 2 MG: 1 CAPSULE ORAL at 21:23

## 2019-10-14 RX ADMIN — INSULIN LISPRO 4 UNITS: 100 INJECTION, SOLUTION INTRAVENOUS; SUBCUTANEOUS at 13:00

## 2019-10-14 RX ADMIN — AZATHIOPRINE 50 MG: 50 TABLET ORAL at 09:52

## 2019-10-14 RX ADMIN — PREDNISONE 5 MG: 2.5 TABLET ORAL at 09:52

## 2019-10-14 RX ADMIN — TACROLIMUS 3 MG: 1 CAPSULE ORAL at 09:52

## 2019-10-14 RX ADMIN — HEPARIN SODIUM 5000 UNITS: 5000 INJECTION INTRAVENOUS; SUBCUTANEOUS at 05:43

## 2019-10-14 NOTE — PROGRESS NOTES
NEPHROLOGY PROGRESS NOTE   Thom Godoy 51 y o  male MRN: [id number]  Unit/Bed#: E5 -01 Encounter: [id number]  Reason for Consult: JAMSHID/CKD    ASSESSMENT AND PLAN:  65-year-old male with a past medical history of renal transplant x2, with 1st transplant 1998, then living related donor in 2001, also with islet cell transplant in 2007, native disease type 1 diabetes, CAD, presented initially with respiratory distress and found to be volume overloaded  We are consulted for JAMSHID/CKD/transplant management  Jamshid on CKD stage 5, baseline serum creatinine seems to be mid 4s, follows with Dr Jose Guadalupe Gonzalez at [facility]  -creatinine 4 7 now seems to be worsening to 5 3 today with worsening azotemia  No emergent need for dialysis yet although patient is close to dialysis and may need dialysis this admission versus soon in the near future depending on his renal function, any uremic symptoms versus electrolytes/acid base abnormalities  -check transplant ultrasound  -bladder scan for PVR nonsignificant  -urinalysis this admission shows slightly concentrated sample, 3+ proteinuria, 4 to 10 RBCs, 1 to 2 WBCs  -patient had recent admission in September 2019 with severe JAMSIHD with peak creatinine 6 9 eventually improved to 5 5 and got discharge  Status post 2nd renal transplant, now chronic allograft dysfunction with slowly declining renal function  -currently remains on immunosuppression with azathioprine, tacrolimus, prednisone  -last tacrolimus level four on 10/13/19  -repeat level to follow from today   -avoid nephrotoxins or NSAIDs    Hyperphosphatemia, CKD BMD, serum phosphorus 6 3 although slowly improving  Continue current phosphorus binders    Renal diet    Severe azotemia, BUN continue to worsen 126 with aggressive diuresis  -patient does not have any significant uremic symptoms although closely monitor  -if has any uremic symptoms, may need to consider dialysis  -with recent history of GI bleed, currently hemoglobin again dropping, check FOBT, which could be contributing to azotemia as well  Anemia CKD  -hemoglobin dropping to 6 7, consider blood transfusion, will need all transplant precautions if any blood transfusion needed   -would recommend additional Lasix 40 mg IV does after blood transfusion   -recently had usually during admission at [facility] in September 2019 which showed pyloric/duodenal ulcers Scales's esophagus  Hypertension  -blood pressure somewhat fluctuating with occasional high readings  Continue to closely monitor, goal SBP 140s to 150s for time being for better renal perfusion   -currently on metoprolol, nifedipine, torsemide, Flomax  -partly volume mediated as well  continue to monitor  Volume overload, seems to be overall improving  Remains on room air now  Currently on torsemide 20 mg p o  B i d  Urine output good  Daily weight, accurate intake and output  Discussed above plan in detail with Dr Wesley Chavez  I have also personally talked to transplant office staff and left my cell phone number for Dr Marcos Pena (transplant nephrologist at [facility]) to call back  With worsening azotemia, worsening renal function, worsening anemia, patient is not stable from renal standpoint for discharge yet  SUBJECTIVE:  Patient seen and examined at bedside  No chest pain, shortness of breath, nausea, vomiting, abdominal pain    Denies any urinary complaint    OBJECTIVE:  Current Weight: Weight - Scale: 54 kg (119 lb 0 8 oz)  Vitals:    10/14/19 0754   BP: (!) 174/81   Pulse: 82   Resp: 18   Temp: 97 8 °F (36 6 °C)   SpO2: 96%       Intake/Output Summary (Last 24 hours) at 10/14/2019 1016  Last data filed at 10/14/2019 0759  Gross per 24 hour   Intake 990 ml   Output 1550 ml   Net -560 ml     Wt Readings from Last 3 Encounters:   10/14/19 54 kg (119 lb 0 8 oz)   10/11/19 56 5 kg (124 lb 9 oz)   09/24/19 46 8 kg (103 lb 2 8 oz)     Temp Readings from Last 3 Encounters:   10/14/19 97 8 °F (36 6 °C) (Temporal) 10/11/19 (!) 96 7 °F (35 9 °C) (Temporal)   09/24/19 98 6 °F (37 °C)     BP Readings from Last 3 Encounters:   10/14/19 (!) 174/81   10/11/19 (!) 179/87   09/24/19 146/66     Pulse Readings from Last 3 Encounters:   10/14/19 82   10/11/19 88   09/24/19 80        Physical Examination:  General:  Sitting in bed, no acute distress   Eyes:  Mild conjunctival pallor present  ENT:  External examination of ears and nose unremarkable  Neck:  Supple  Respiratory:  Bilateral air entry present  CVS:  S1, S2 present  GI:  Soft, nontender, nondistended  CNS:  Active alert oriented x3  Extremities:  Left lower extremity 2+ edema, right AKA present  Skin:  No new rash in legs    Medications:    Current Facility-Administered Medications:     atorvastatin (LIPITOR) tablet 80 mg, 80 mg, Oral, After [place], GAVINO, 80 mg at 10/13/19 1642    azaTHIOprine (IMURAN) tablet 50 mg, 50 mg, Oral, Daily, GAVINO Shell, 50 mg at 10/14/19 1009    calcitriol (ROCALTROL) capsule 1 mcg, 1 mcg, Oral, Daily, Mary Brewster MD, 1 mcg at 10/14/19 2864    cholecalciferol (VITAMIN D3) tablet 1,000 Units, 1,000 Units, Oral, Daily, Mary Brewster MD, 1,000 Units at 10/14/19 0952    heparin (porcine) subcutaneous injection 5,000 Units, 5,000 Units, Subcutaneous, Q8H [address], GAVINO Shell, 5,000 Units at 10/14/19 0543    hydrALAZINE (APRESOLINE) injection 10 mg, 10 mg, Intravenous, Q6H PRN, Ashely Estevez PA-C    insulin detemir (LEVEMIR) subcutaneous injection 6 Units, 6 Units, Subcutaneous, QAMLester CRNP, 6 Units at 10/14/19 0952    insulin detemir (LEVEMIR) subcutaneous injection 7 Units, 7 Units, Subcutaneous, HSLester CRNP, 7 Units at 10/13/19 2101    insulin lispro (HumaLOG) 100 units/mL subcutaneous injection 2-12 Units, 2-12 Units, Subcutaneous, TID With Meals, 4 Units at 10/13/19 1300 **AND** Fingerstick Glucose (POCT), , , TID Lester LOPEZ CRNP    levalbuterol [facility]) inhalation solution 1 25 mg, 1 25 mg, Nebulization, Q8H PRN, Ti Beverage, CRNP    metoprolol succinate (TOPROL-XL) 24 hr tablet 50 mg, 50 mg, Oral, Daily, Ti Beverage, CRNP, 50 mg at 10/14/19 9132    NIFEdipine (PROCARDIA XL) 24 hr tablet 30 mg, 30 mg, Oral, Daily, Ti Beverage, CRNP, 30 mg at 10/14/19 3157    pantoprazole (PROTONIX) EC tablet 40 mg, 40 mg, Oral, Early Morning, Foreign Prechtel, DO, 40 mg at 10/14/19 0544    predniSONE tablet 5 mg, 5 mg, Oral, Daily, Ti Beverage, CRNP, 5 mg at 10/14/19 1795    sevelamer (RENAGEL) tablet 800 mg, 800 mg, Oral, TID With Meals, Ti Beverage, CRNP, 800 mg at 10/14/19 1879    tacrolimus (PROGRAF) capsule 2 mg, 2 mg, Oral, QPM, Ti Beverage, CRNP, 2 mg at 10/13/19 2053    tacrolimus (PROGRAF) capsule 3 mg, 3 mg, Oral, Daily, Ti Beverage, CRNP, 3 mg at 10/14/19 3897    tamsulosin (FLOMAX) capsule 0 4 mg, 0 4 mg, Oral, Daily With Yulia Estevez PA-C, 0 4 mg at 10/13/19 1642    torsemide (DEMADEX) tablet 20 mg, 20 mg, Oral, BID (diuretic), Sunday Pacheco MD, 20 mg at 10/14/19 0951    Laboratory Results:  Results from last 7 days   Lab Units 10/14/19  0711 10/13/19  0625 10/12/19  0416 10/11/19  0346   WBC Thousand/uL 9 96 9 85 9 29 14 61*   HEMOGLOBIN g/dL 6 7* 7 5* 7 0* 8 1*   HEMATOCRIT % 21 0* 24 2* 22 7* 26 0*   PLATELETS Thousands/uL 307 334 261 436*   SODIUM mmol/L 138 136 139 141   POTASSIUM mmol/L 4 1 4 5 4 3 5 2   CHLORIDE mmol/L 101 98* 100 102   CO2 mmol/L 24 26 27 27   BUN mg/dL 126* 120* 110* 93*   CREATININE mg/dL 5 39* 5 28* 5 18* 4 76*   CALCIUM mg/dL 7 9* 8 1* 8 0* 8 0*   MAGNESIUM mg/dL  --   --   --  2 2   PHOSPHORUS mg/dL 6 3* 6 6* 7 0*  --        No orders to display       Portions of the record may have been created with voice recognition software  Occasional wrong word or "sound a like" substitutions may have occurred due to the inherent limitations of voice recognition software   Read the chart carefully and recognize, using context, where substitutions have occurred

## 2019-10-14 NOTE — PLAN OF CARE
Problem: Potential for Falls  Goal: Patient will remain free of falls  Description  INTERVENTIONS:  - Assess patient frequently for physical needs  -  Identify cognitive and physical deficits and behaviors that affect risk of falls    -  [place] fall precautions as indicated by assessment   - Educate patient/family on patient safety including physical limitations  - Instruct patient to call for assistance with activity based on assessment  - Modify environment to reduce risk of injury  - Consider OT/PT consult to assist with strengthening/mobility  Outcome: Progressing     Problem: Prexisting or High Potential for Compromised Skin Integrity  Goal: Skin integrity is maintained or improved  Description  INTERVENTIONS:  - Identify patients at risk for skin breakdown  - Assess and monitor skin integrity  - Assess and monitor nutrition and hydration status  - Monitor labs   - Assess for incontinence   - Turn and reposition patient  - Assist with mobility/ambulation  - Relieve pressure over bony prominences  - Avoid friction and shearing  - Provide appropriate hygiene as needed including keeping skin clean and dry  - Evaluate need for skin moisturizer/barrier cream  - Collaborate with interdisciplinary team   - Patient/family teaching  - Consider wound care consult   Outcome: Progressing     Problem: GENITOURINARY - ADULT  Goal: Maintains or returns to baseline urinary function  Description  INTERVENTIONS:  - Assess urinary function  - Encourage oral fluids to ensure adequate hydration if ordered  - Administer IV fluids as ordered to ensure adequate hydration  - Administer ordered medications as needed  - Offer frequent toileting  - Follow urinary retention protocol if ordered  Outcome: Progressing  Goal: Absence of urinary retention  Description  INTERVENTIONS:  - Assess patients ability to void and empty bladder  - Monitor I/O  - Bladder scan as needed  - Discuss with physician/AP medications to alleviate retention as needed  - Discuss catheterization for long term situations as appropriate  Outcome: Progressing  Goal: Urinary catheter remains patent  Description  INTERVENTIONS:  - Assess patency of urinary catheter  - If patient has a chronic krishnan, consider changing catheter if non-functioning  - Follow guidelines for intermittent irrigation of non-functioning urinary catheter  Outcome: Progressing     Problem: METABOLIC, FLUID AND ELECTROLYTES - ADULT  Goal: Electrolytes maintained within normal limits  Description  INTERVENTIONS:  - Monitor labs and assess patient for signs and symptoms of electrolyte imbalances  - Administer electrolyte replacement as ordered  - Monitor response to electrolyte replacements, including repeat lab results as appropriate  - Instruct patient on fluid and nutrition as appropriate  Outcome: Progressing  Goal: Fluid balance maintained  Description  INTERVENTIONS:  - Monitor labs   - Monitor I/O and WT  - Instruct patient on fluid and nutrition as appropriate  - Assess for signs & symptoms of volume excess or deficit  Outcome: Progressing  Goal: Glucose maintained within target range  Description  INTERVENTIONS:  - Monitor Blood Glucose as ordered  - Assess for signs and symptoms of hyperglycemia and hypoglycemia  - Administer ordered medications to maintain glucose within target range  - Assess nutritional intake and initiate nutrition service referral as needed  Outcome: Progressing

## 2019-10-14 NOTE — ASSESSMENT & PLAN NOTE
· History of renal transplant 1998 and 2001 with [facility]  · Per Nephro, patient's baseline appears 4 5  His immunosuppressive therapy is tacrolimus, azathioprine, prednisone 5 mg

## 2019-10-14 NOTE — ASSESSMENT & PLAN NOTE
· Now resolved  · Patient is recovering from flash pulmonary edema, stepped down from ICU  · This was likely 2/2 hypertension and volume overload  · MIKE on stage 5 CKD   · Echo EF 52%, moderate hypokinesis, peak PA pressure 61 mm Hg signifying moderate to severe pulmonary hypertension  · Resolved with IV Lasix, now transitioned to p o   Diuretic per Nephrology  · No further respiratory complaints at this time

## 2019-10-14 NOTE — SOCIAL WORK
CM met with the patient and his brother, Ijeoma Mukherjee, at bedside to complete a general CM assessment:    The patient is a re-admission  Last admission he was only  Here a total of 8hrs and was transferred down to [facility] as he follows with the transplant team at that facility  He was there for two days and discharged home with no needs  He is back here for MIKE and a low hemoglobin  The patient lives in a two story home, he can have a first floor set up if needed  He lives with his wife  He is independent with adls  He uses either a RW or a WC for ambulatory needs  No hx of VNA or STR needs in the past  He is employed as a   His PCP is Dr Ector Lagos  He has no formal POA, his health care agent would be his wife, Eduardo Chao  He uses [address] [facility] in [facility] for rx needs  No Mental Health History  No D&A history  CM following for discharge needs

## 2019-10-14 NOTE — PROGRESS NOTES
Progress Note - Susana Mathis 1970, 51 y o  male MRN: [id number]    Unit/Bed#: E5 -01 Encounter: [id number]    Primary Care Provider: Mendoza Weems DO   Date and time admitted to hospital: 10/11/2019 10:53 AM        * Acute respiratory failure ([address] )  Assessment & Plan  · Now resolved  · Patient is recovering from flash pulmonary edema, stepped down from ICU  · This was likely 2/2 hypertension and volume overload  · MIKE on stage 5 CKD   · Echo EF 52%, moderate hypokinesis, peak PA pressure 61 mm Hg signifying moderate to severe pulmonary hypertension  · Resolved with IV Lasix, now transitioned to p o  Diuretic per Nephrology  · No further respiratory complaints at this time    Acute kidney injury [facility])  Assessment & Plan  · MIKE POA with CKD stage 5 and history of kidney transplant  · Patient is near baseline  · He is agreeable to renal replacement therapy if needed  Stage 5 chronic kidney disease not on chronic dialysis [facility])  Assessment & Plan  · History of renal transplant 1998 and 2001 with [facility]  · Per Nephro, patient's baseline appears 4 5  His immunosuppressive therapy is tacrolimus, azathioprine, prednisone 5 mg  Hypertension  Assessment & Plan  · Patient's regimen is metoprolol 50 mg daily, Procardia 30 mg daily  · We are also providing p r n  Hydralazine here  · Goal is to avoid blood pressure extremes    Patient was hypertensive this a m  and required p r n   · Hold parameters for SBP less than 130    Anemia  Assessment & Plan  Hemoglobin dropped to 6 7  History of upper GI bleeding  Previous upper GI endoscopy showed duodenitis and esophagitis  Baseline hemoglobin 7-8  Transfuse 1 unit of PRBC  reassess posttransfusion hemoglobin  Check FOBT if positive consider GI consult      VTE Pharmacologic Prophylaxis:   Pharmacologic: Heparin    Patient Centered Rounds: I have performed bedside rounds with nursing staff today    Discussions with Specialists or Other Care Team Provider:     Education and Discussions with Family / Patient:     Current Length of Stay: 3 day(s)    Current Patient Status: Inpatient   Certification Statement: The patient will continue to require additional inpatient hospital stay due to Acute renal failure, anemia    Discharge Plan:  Probably in 2-3 days    Code Status: Prior      Subjective:   Has no complaints  Denies hematemesis, melena, dizziness, palpitation, shortness of breath, cough, fever, urinary or bowel habit change  Objective:     Vitals:   Temp (24hrs), Av 8 °F (37 1 °C), Min:97 8 °F (36 6 °C), Max:99 4 °F (37 4 °C)    Temp:  [97 8 °F (36 6 °C)-99 4 °F (37 4 °C)] 97 8 °F (36 6 °C)  HR:  [75-82] 82  Resp:  [18] 18  BP: (151-174)/(72-81) 174/81  SpO2:  [94 %-96 %] 96 %  Body mass index is 23 25 kg/m²  Input and Output Summary (last 24 hours):        Intake/Output Summary (Last 24 hours) at 10/14/2019 1240  Last data filed at 10/14/2019 0759  Gross per 24 hour   Intake 990 ml   Output 1550 ml   Net -560 ml       Physical Exam:     General appearance: alert, appears stated age and cooperative  Head: Normocephalic, without obvious abnormality, atraumatic  Lungs: clear to auscultation bilaterally  Heart: regular rate and rhythm  Abdomen: soft, non-tender, positive bowel sounds   Back: negative  Extremities: Right AKA, mild pretibial and pedal edema on the left side  Neurologic: Grossly normal    Additional Data:     Labs:    Results from last 7 days   Lab Units 10/14/19  0711   WBC Thousand/uL 9 96   HEMOGLOBIN g/dL 6 7*   HEMATOCRIT % 21 0*   PLATELETS Thousands/uL 307   NEUTROS PCT % 59   LYMPHS PCT % 28   MONOS PCT % 8   EOS PCT % 2     Results from last 7 days   Lab Units 10/14/19  0711   SODIUM mmol/L 138   POTASSIUM mmol/L 4 1   CHLORIDE mmol/L 101   CO2 mmol/L 24   BUN mg/dL 126*   CREATININE mg/dL 5 39*   ANION GAP mmol/L 13   CALCIUM mg/dL 7 9*   ALBUMIN g/dL 2 1*   TOTAL BILIRUBIN mg/dL 0 36   ALK PHOS U/L 81   ALT U/L 27   AST U/L 44   GLUCOSE RANDOM mg/dL 85     Results from last 7 days   Lab Units 10/11/19  0346   INR  0 99     Results from last 7 days   Lab Units 10/14/19  1119 10/14/19  0756 10/13/19  2027 10/13/19  1608 10/13/19  1112 10/13/19  0712 10/12/19  2038 10/12/19  1624 10/12/19  1230 10/12/19  0645 10/12/19  0430 10/11/19  2202   POC GLUCOSE mg/dl 242* 120 152* 119 220* 169* 223* 97 175* 124 204* 434*     Results from last 7 days   Lab Units 10/11/19  1346   HEMOGLOBIN A1C % 4 8     Results from last 7 days   Lab Units 10/11/19  0346   LACTIC ACID mmol/L 2 1*           * I Have Reviewed All Lab Data Listed Above  * Additional Pertinent Lab Tests Reviewed: [address] Admission Reviewed    Imaging:    Imaging Reports Reviewed Today Include: images reviewed    Recent Cultures (last 7 days):     Results from last 7 days   Lab Units 10/11/19  0346   BLOOD CULTURE  No Growth at 72 hrs         Last 24 Hours Medication List:     Current Facility-Administered Medications:  atorvastatin 80 mg Oral After GAVINO Charles   azaTHIOprine 50 mg Oral Daily Concha Romberg, CRNP   calcitriol 1 mcg Oral Daily Manfred Dee MD   cholecalciferol 1,000 Units Oral Daily Manfred Dee MD   heparin (porcine) 5,000 Units Subcutaneous [facility] Concha Romberg, CRNP   hydrALAZINE 10 mg Intravenous Q6H PRN Ashely Estevez PA-C   insulin detemir 6 Units Subcutaneous QAM Concha Romberg, CRNP   insulin detemir 7 Units Subcutaneous HS Concha Romberg, CRNP   insulin lispro 2-12 Units Subcutaneous TID With Meals Concha Romberg, CRNP   levalbuterol 1 25 mg Nebulization Q8H PRN Concha Romberg, CRNP   metoprolol succinate 50 mg Oral Daily Concha Romberg, CRNP   NIFEdipine 30 mg Oral Daily Concha Romberg, CRNP   pantoprazole 40 mg Oral Early Morning Foreign Prechtel, DO   predniSONE 5 mg Oral Daily Concha Romberg, CRNP   sevelamer 800 mg Oral TID With Meals Concha Romberg, CRNP   tacrolimus 2 mg Oral QPM Mai Negrete GAVINO Velasco   tacrolimus 3 mg Oral Daily GAVINO Soares   tamsulosin 0 4 mg Oral Daily With Dinner Ashely Estevez PA-C   torsemide 20 mg Oral BID (diuretic) Rosa M Cole MD        Today, Patient Was Seen By: Boni Cockayne, MD    ** Please Note: Dictation voice to text software may have been used in the creation of this document   **

## 2019-10-14 NOTE — UTILIZATION REVIEW
Notification of Inpatient Admission/Inpatient Authorization Request  This is a Notification of Inpatient Admission/Request for Inpatient Authorization for our facility [facility]  Be advised that this patient was admitted to our facility under Inpatient Status  Please contact the Utilization Review Department where the patient is receiving care services for additional admission information  Facility: [facility]  Place of Service Code: 21   [facility]  Presentation Date & Time: 10/11/2019 10:53 AM  Inpatient Admission Date & Time: 10/11/19 1053  Discharge Date & Time: No discharge date for patient encounter  Discharge Disposition (if discharged): [facility]  Attending Physician and NPI#: Nila Beasley, [address] Yu [[id number]]  Admission Orders (From admission, onward)     Ordered        10/11/19 1133  Inpatient Admission  Once                   Network Utilization Review Department  Phone: [phone]; Fax [phone]  [email or web address]  ATTENTION: Please call with any questions or concerns to [phone]  and carefully listen to the prompts so that you are directed to the right person  Send all requests for admission clinical reviews, approved or denied determinations and any other requests to fax [phone]   All voicemails are confidential

## 2019-10-14 NOTE — ASSESSMENT & PLAN NOTE
Hemoglobin dropped to 6 7  History of upper GI bleeding  Previous upper GI endoscopy showed duodenitis and esophagitis  Baseline hemoglobin 7-8  Transfuse 1 unit of PRBC  reassess posttransfusion hemoglobin  Check FOBT if positive consider GI consult

## 2019-10-14 NOTE — UTILIZATION REVIEW
Initial Clinical Review   PATIENT ADMITTED TO ICU FOR ACUTE RESPIRATORY FAILURE     Admission: Date/Time/Statement: Inpatient Admission Orders (From admission, onward)     Ordered        10/11/19 1133  Inpatient Admission  Once                   Orders Placed This Encounter   Procedures    Inpatient Admission     Standing Status:   Standing     Number of Occurrences:   1     Order Specific Question:   Admitting Physician     Answer:   Anni Nichole [[id number]]     Order Specific Question:   Level of Care     Answer:   Critical Care [15]     Order Specific Question:   Estimated length of stay     Answer:   More than 2 Midnights     Order Specific Question:   Certification     Answer:   I certify that inpatient services are medically necessary for this patient for a duration of greater than two midnights  See H&P and MD Progress Notes for additional information about the patient's course of treatment  No chief complaint on file  SOB      Assessment/Plan: 50 YO MALE PRESENTED TO [facility] FROM HOME VIA EMS  AS INPATIENT ADMISSION FOR ACUTE RESPIRATORY FAILURE  ON ARRIVAL TO [facility] PATIENT IN SEVERE DISTRESS AND PLACED ON BIPAP  NEB IV  NITRO STEROIDS AND DIURETICS  PATIENT WAS LIFE FLIGHTED TO [facility] FOR HIGHER LEVEL OF CARE  UPON ARRIVAL TO [facility] PATIENT HX LIVING  RELATED DONOR KIDNEY TRANSPLANT DM TYPE 1 AND STAGE 5 CKD WITH CREATININE BASE LINE 4 5 PATIENT HYPERTENSIVE AND FLASHED PULMONARY EDEMA PATIENT WEANED OFF BIPAP AND 4 PLACED ON 4 L NC   PLAN     Assessment/Plan:  1  Flash pulmonary edema secondary to hypertension  · Patient was initially treated with diuretics, nitroglycerin, and steroids  · Continue to treat hypertension  · Diuretics per Nephrology  2   Acute kidney in the setting of kidney transplant  · Consult Nephrology  · Monitor kidney indices  · May require hemodialysis  · Creatinine close to baseline of 4 5  · Strict I&O monitoring  · Continue anti-rejection drugs  3  Anasarca  · Diuretics per Nephrology  · No history of congestive heart failure, check echocardiogram  4  Type 1 diabetes  · Sliding scale insulin   · Monitor blood glucose  · Controlled carbohydrate diet  · Resume home insulin regimen at discharge cut  5  History of GI bleed ; Protonix daily  6   Status post right AKA in February of this year   Vitals   Temperature Pulse Respirations Blood Pressure SpO2   10/11/19 1155 10/11/19 1030 10/11/19 1100 10/11/19 1030 10/11/19 1100   99 °F (37 2 °C) 90 22 (!) 179/82 97 %      Temp Source Heart Rate Source Patient Position - Orthostatic VS BP Location FiO2 (%)   10/11/19 1155 -- 10/12/19 1611 10/12/19 1611 --   Temporal  Sitting Right arm       Pain Score       10/11/19 1007       No Pain        Wt Readings from Last 1 Encounters:   10/12/19 54 1 kg (119 lb 4 3 oz)     Additional Vital Signs:   10/12/19 0400  98 9 °F (37 2 °C)  66  20  149/82  116  100 %  --  --   10/12/19 0200  --  72  20  144/78  --  100 %  --  --   10/12/19 0018  --  80  15  139/66  96  99 %  --  --   10/11/19 2303  99 °F (37 2 °C)  --  --  --  --  --  --  --   10/11/19 2301  --  --  --  175/90Abnormal   --  --  --  --   10/11/19 2258  --  80  24Abnormal   163/82  112  94 %  --  --   10/11/19 2211  --  86  16  175/90Abnormal   129  94 %  --  --   10/11/19 2022  99 °F (37 2 °C)  --  --  --  --  --  --  --   10/11/19 2012  --  86  27Abnormal   173/90Abnormal   117  --  --  --   10/11/19 1900  --  84  27Abnormal   165/77  102  --  --  --   10/11/19 1800  --  80  17  164/80  127  --  --  --   10/11/19 1700  --  84  18  142/67  90  --  --  --   10/11/19 1649  98 6 °F (37 °C)  --  --  --  --  --  --  --   10/11/19 1600  --  90  20  180/85Abnormal   114  --  --  --   10/11/19 1500  --  92  18  178/80Abnormal   106  --  --  --   10/11/19 1302  --  95  --  184/81Abnormal   --  --  --  --   10/11/19 1200  --  92  25Abnormal   179/81Abnormal   102  99 %  --  --   10/11/19 1155  99 °F (37 2 °C)  --  --  --  --             Pertinent Labs/Diagnostic Test Results:   Results from last 7 days   Lab Units 10/12/19  0416 10/11/19  0346   WBC Thousand/uL 9 29 14 61*   HEMOGLOBIN g/dL 7 0* 8 1*   HEMATOCRIT % 22 7* 26 0*   PLATELETS Thousands/uL 261 436*   NEUTROS ABS Thousands/µL 6 74 9 82*         Results from last 7 days   Lab Units 10/12/19  0416 10/11/19  0346   SODIUM mmol/L 139 141   POTASSIUM mmol/L 4 3 5 2   CHLORIDE mmol/L 100 102   CO2 mmol/L 27 27   ANION GAP mmol/L 12 12   BUN mg/dL 110* 93*   CREATININE mg/dL 5 18* 4 76*   EGFR ml/min/1 73sq m 12 13   CALCIUM mg/dL 8 0* 8 0*   MAGNESIUM mg/dL  --  2 2   PHOSPHORUS mg/dL 7 0*  --      Results from last 7 days   Lab Units 10/11/19  0346   AST U/L 115*   ALT U/L 55   ALK PHOS U/L 161*   TOTAL PROTEIN g/dL 6 7   ALBUMIN g/dL 2 5*   TOTAL BILIRUBIN mg/dL 0 60     Results from last 7 days   Lab Units 10/12/19  1624 10/12/19  1230 10/12/19  0645 10/12/19  0430 10/11/19  2202 10/11/19  2156 10/11/19  1613 10/11/19  1159   POC GLUCOSE mg/dl 97 175* 124 204* 434* 439* 358* 267*     Results from last 7 days   Lab Units 10/12/19  0416 10/11/19  0346   GLUCOSE RANDOM mg/dL 191* 187*         Results from last 7 days   Lab Units 10/11/19  1346   HEMOGLOBIN A1C % 4 8   EAG mg/dl 91     BETA-HYDROXYBUTYRATE   Date Value Ref Range Status   12/19/2018 1 59 (H) 0 02 - 0 27 mmol/L Final          Results from last 7 days   Lab Units 10/11/19  0346   PH JAMES  7 349   PCO2 JAMES mm Hg 48 8   PO2 JAMES mm Hg 66 2*   HCO3 JAMES mmol/L 26 3   BASE EXC JAMES mmol/L 0 4   O2 CONTENT JAMES ml/dL 11 1   O2 HGB, VENOUS % 88 4*             Results from last 7 days   Lab Units 10/11/19  0346   TROPONIN I ng/mL 0 06*         Results from last 7 days   Lab Units 10/11/19  0346   PROTIME seconds 13 1   INR  0 99   PTT seconds 31             Results from last 7 days   Lab Units 10/11/19  0346   LACTIC ACID mmol/L 2 1*     Results from last 7 days   Lab Units 10/11/19  0346   NT-PRO BNP pg/mL 17,571*     Results from last 7 days   Lab Units 10/11/19  0747   CLARITY UA  Clear   COLOR UA  Yellow   SPEC GRAV UA  1 020   PH UA  7 5   GLUCOSE UA mg/dl 100 (1/10%)*   KETONES UA mg/dl Negative   BLOOD UA  Small*   PROTEIN UA mg/dl >=300*   NITRITE UA  Negative   BILIRUBIN UA  Negative   UROBILINOGEN UA E U /dl 0 2   LEUKOCYTES UA  Negative   WBC UA /hpf 1-2*   RBC UA /hpf 4-10*   BACTERIA UA /hpf Occasional   EPITHELIAL CELLS WET PREP /hpf None Seen     Results from last 7 days   Lab Units 10/11/19  0346   BLOOD CULTURE  No Growth at 24 hrs  CXR 10-11-19  1  Large increased opacity overlying the right lower lung field compatible with pneumonia in the appropriate clinical setting     2   Cardiomegaly and pulmonary vascular congestion          EKG 10-11-19  Comparison to cardiac monitor: Yes    Interpretation:     Interpretation: abnormal    Rate:     ECG rate:  103    ECG rate assessment: tachycardic    Rhythm:     Rhythm: sinus tachycardia    Ectopy:     Ectopy: none    QRS:     QRS axis:  Normal    QRS intervals:  Normal  Conduction:     Conduction: normal    ST segments:     ST segments:  Abnormal    Depression:  V4, V5 and V6        Past Medical History:   Diagnosis Date    Bacteremia 12/21/2018    Cardiac arrest ([facility] )     Diabetes mellitus ([place] [address] )     Diabetes mellitus type 1 ([place] [address] )     GI bleed     Hypertension     Infection at site of external fixator pin ([facility] )     MI (myocardial infarction) ([place] [address] )     Pneumonia     Last Assessed [id number]    Renal failure     Renal transplant, status post 07/21/2007     Present on Admission:   Stage 4 chronic kidney disease ([facility] )   Acute respiratory failure ([facility] )      Admitting Diagnosis: Pulmonary edema [J81 1]  Age/Sex: 51 y o  male  Admission Orders:    Medications:  [MAR Hold] atorvastatin 80 mg Oral After Dinner   [MAR Hold] azaTHIOprine 50 mg Oral Daily   [MAR Hold] calcitriol 1 mcg Oral Daily   [MAR Hold] heparin (porcine) 5,000 Units Subcutaneous Q8H [facility]   [MAR Hold] insulin detemir 6 Units Subcutaneous QAM   [MAR Hold] insulin detemir 7 Units Subcutaneous HS   [MAR Hold] insulin lispro 2-12 Units Subcutaneous TID With Meals   [MAR Hold] metoprolol succinate 50 mg Oral Daily   [MAR Hold] NIFEdipine 30 mg Oral Daily   [MAR Hold] pantoprazole 40 mg Intravenous Q24H [facility]   [MAR Hold] predniSONE 5 mg Oral Daily   [MAR Hold] sevelamer 800 mg Oral TID With Meals   [MAR Hold] tacrolimus 2 mg Oral QPM   [MAR Hold] tacrolimus 3 mg Oral Daily   [MAR Hold] torsemide 20 mg Oral Daily          [MAR Hold] hydrALAZINE 10 mg Intravenous Q6H PRN   [MAR Hold] levalbuterol 1 25 mg Nebulization Q8H PRN       IP CONSULT TO NEPHROLOGY   TELEMETRY  SCD  CONTINUOUS CARDIO-PULMONARY MONITORING   BLOOD SUGARS AC AND HS      Network Utilization Review Department  Phone: [phone]; Fax [phone]  [email or web address]  ATTENTION: Please call with any questions or concerns to [phone]  and carefully listen to the prompts so that you are directed to the right person  Send all requests for admission clinical reviews, approved or denied determinations and any other requests to fax [phone]   All voicemails are confidential

## 2019-10-15 VITALS
WEIGHT: 119.05 LBS | SYSTOLIC BLOOD PRESSURE: 122 MMHG | TEMPERATURE: 98.3 F | BODY MASS INDEX: 23.37 KG/M2 | RESPIRATION RATE: 18 BRPM | HEART RATE: 66 BPM | HEIGHT: 60 IN | OXYGEN SATURATION: 95 % | DIASTOLIC BLOOD PRESSURE: 65 MMHG

## 2019-10-15 LAB
ABO GROUP BLD BPU: NORMAL
ANION GAP SERPL CALCULATED.3IONS-SCNC: 12 MMOL/L (ref 4–13)
BASOPHILS # BLD AUTO: 0.03 THOUSANDS/ΜL (ref 0–0.1)
BASOPHILS NFR BLD AUTO: 0 % (ref 0–1)
BPU ID: NORMAL
BUN SERPL-MCNC: 121 MG/DL (ref 5–25)
CALCIUM SERPL-MCNC: 8.2 MG/DL (ref 8.3–10.1)
CHLORIDE SERPL-SCNC: 99 MMOL/L (ref 100–108)
CO2 SERPL-SCNC: 24 MMOL/L (ref 21–32)
CREAT SERPL-MCNC: 5.49 MG/DL (ref 0.6–1.3)
CROSSMATCH: NORMAL
EOSINOPHIL # BLD AUTO: 0.27 THOUSAND/ΜL (ref 0–0.61)
EOSINOPHIL NFR BLD AUTO: 3 % (ref 0–6)
ERYTHROCYTE [DISTWIDTH] IN BLOOD BY AUTOMATED COUNT: 14.9 % (ref 11.6–15.1)
GFR SERPL CREATININE-BSD FRML MDRD: 11 ML/MIN/1.73SQ M
GLUCOSE SERPL-MCNC: 213 MG/DL (ref 65–140)
GLUCOSE SERPL-MCNC: 225 MG/DL (ref 65–140)
GLUCOSE SERPL-MCNC: 237 MG/DL (ref 65–140)
HCT VFR BLD AUTO: 26.7 % (ref 36.5–49.3)
HGB BLD-MCNC: 8.5 G/DL (ref 12–17)
IMM GRANULOCYTES # BLD AUTO: 0.05 THOUSAND/UL (ref 0–0.2)
IMM GRANULOCYTES NFR BLD AUTO: 1 % (ref 0–2)
LYMPHOCYTES # BLD AUTO: 2.53 THOUSANDS/ΜL (ref 0.6–4.47)
LYMPHOCYTES NFR BLD AUTO: 31 % (ref 14–44)
MCH RBC QN AUTO: 29.6 PG (ref 26.8–34.3)
MCHC RBC AUTO-ENTMCNC: 31.8 G/DL (ref 31.4–37.4)
MCV RBC AUTO: 93 FL (ref 82–98)
MONOCYTES # BLD AUTO: 0.68 THOUSAND/ΜL (ref 0.17–1.22)
MONOCYTES NFR BLD AUTO: 8 % (ref 4–12)
NEUTROPHILS # BLD AUTO: 4.63 THOUSANDS/ΜL (ref 1.85–7.62)
NEUTS SEG NFR BLD AUTO: 57 % (ref 43–75)
NRBC BLD AUTO-RTO: 0 /100 WBCS
PLATELET # BLD AUTO: 320 THOUSANDS/UL (ref 149–390)
PMV BLD AUTO: 8.5 FL (ref 8.9–12.7)
POTASSIUM SERPL-SCNC: 4.2 MMOL/L (ref 3.5–5.3)
RBC # BLD AUTO: 2.87 MILLION/UL (ref 3.88–5.62)
SODIUM SERPL-SCNC: 135 MMOL/L (ref 136–145)
UNIT DISPENSE STATUS: NORMAL
UNIT PRODUCT CODE: NORMAL
UNIT RH: NORMAL
WBC # BLD AUTO: 8.19 THOUSAND/UL (ref 4.31–10.16)

## 2019-10-15 PROCEDURE — 85025 COMPLETE CBC W/AUTO DIFF WBC: CPT | Performed by: INTERNAL MEDICINE

## 2019-10-15 PROCEDURE — 99233 SBSQ HOSP IP/OBS HIGH 50: CPT | Performed by: INTERNAL MEDICINE

## 2019-10-15 PROCEDURE — 82948 REAGENT STRIP/BLOOD GLUCOSE: CPT

## 2019-10-15 PROCEDURE — 80048 BASIC METABOLIC PNL TOTAL CA: CPT | Performed by: INTERNAL MEDICINE

## 2019-10-15 PROCEDURE — 99239 HOSP IP/OBS DSCHRG MGMT >30: CPT | Performed by: INTERNAL MEDICINE

## 2019-10-15 RX ORDER — PANTOPRAZOLE SODIUM 40 MG/1
40 TABLET, DELAYED RELEASE ORAL
Qty: 30 TABLET | Refills: 0 | Status: SHIPPED | OUTPATIENT
Start: 2019-10-16 | End: 2019-11-19 | Stop reason: SDUPTHER

## 2019-10-15 RX ORDER — SEVELAMER HYDROCHLORIDE 800 MG/1
800 TABLET, FILM COATED ORAL
Qty: 90 TABLET | Refills: 0 | Status: SHIPPED | OUTPATIENT
Start: 2019-10-15 | End: 2019-12-01

## 2019-10-15 RX ORDER — NIFEDIPINE 30 MG/1
30 TABLET, FILM COATED, EXTENDED RELEASE ORAL DAILY
Qty: 30 TABLET | Refills: 0 | Status: SHIPPED | OUTPATIENT
Start: 2019-10-16 | End: 2019-10-25 | Stop reason: HOSPADM

## 2019-10-15 RX ORDER — TORSEMIDE 20 MG/1
20 TABLET ORAL DAILY
Qty: 30 TABLET | Refills: 0 | Status: SHIPPED | OUTPATIENT
Start: 2019-10-15 | End: 2019-10-25 | Stop reason: HOSPADM

## 2019-10-15 RX ADMIN — CALCITRIOL CAPSULES 0.25 MCG 1 MCG: 0.25 CAPSULE ORAL at 09:23

## 2019-10-15 RX ADMIN — INSULIN DETEMIR 6 UNITS: 100 INJECTION, SOLUTION SUBCUTANEOUS at 09:22

## 2019-10-15 RX ADMIN — SEVELAMER HYDROCHLORIDE 800 MG: 800 TABLET, FILM COATED PARENTERAL at 07:40

## 2019-10-15 RX ADMIN — INSULIN LISPRO 4 UNITS: 100 INJECTION, SOLUTION INTRAVENOUS; SUBCUTANEOUS at 07:39

## 2019-10-15 RX ADMIN — AZATHIOPRINE 50 MG: 50 TABLET ORAL at 09:23

## 2019-10-15 RX ADMIN — MELATONIN 1000 UNITS: at 09:23

## 2019-10-15 RX ADMIN — NIFEDIPINE 30 MG: 30 TABLET, FILM COATED, EXTENDED RELEASE ORAL at 09:23

## 2019-10-15 RX ADMIN — TACROLIMUS 3 MG: 1 CAPSULE ORAL at 09:23

## 2019-10-15 RX ADMIN — PANTOPRAZOLE SODIUM 40 MG: 40 TABLET, DELAYED RELEASE ORAL at 05:24

## 2019-10-15 RX ADMIN — SEVELAMER HYDROCHLORIDE 800 MG: 800 TABLET, FILM COATED PARENTERAL at 11:44

## 2019-10-15 RX ADMIN — METOPROLOL SUCCINATE 50 MG: 50 TABLET, EXTENDED RELEASE ORAL at 09:22

## 2019-10-15 RX ADMIN — INSULIN LISPRO 4 UNITS: 100 INJECTION, SOLUTION INTRAVENOUS; SUBCUTANEOUS at 11:45

## 2019-10-15 RX ADMIN — PREDNISONE 5 MG: 2.5 TABLET ORAL at 09:22

## 2019-10-15 RX ADMIN — TORSEMIDE 20 MG: 20 TABLET ORAL at 07:40

## 2019-10-15 RX ADMIN — HEPARIN SODIUM 5000 UNITS: 5000 INJECTION INTRAVENOUS; SUBCUTANEOUS at 05:25

## 2019-10-15 NOTE — ASSESSMENT & PLAN NOTE
· History of renal transplant 1998 and 2001 with [facility]    · Continue immunosuppressive therapies  · No uremic signs  · Follow-up with [facility] and Nephrology as an outpatient

## 2019-10-15 NOTE — PLAN OF CARE
Problem: Potential for Falls  Goal: Patient will remain free of falls  Description  INTERVENTIONS:  - Assess patient frequently for physical needs  -  Identify cognitive and physical deficits and behaviors that affect risk of falls    -  Lulu fall precautions as indicated by assessment   - Educate patient/family on patient safety including physical limitations  - Instruct patient to call for assistance with activity based on assessment  - Modify environment to reduce risk of injury  - Consider OT/PT consult to assist with strengthening/mobility  Outcome: Progressing     Problem: Prexisting or High Potential for Compromised Skin Integrity  Goal: Skin integrity is maintained or improved  Description  INTERVENTIONS:  - Identify patients at risk for skin breakdown  - Assess and monitor skin integrity  - Assess and monitor nutrition and hydration status  - Monitor labs   - Assess for incontinence   - Turn and reposition patient  - Assist with mobility/ambulation  - Relieve pressure over bony prominences  - Avoid friction and shearing  - Provide appropriate hygiene as needed including keeping skin clean and dry  - Evaluate need for skin moisturizer/barrier cream  - Collaborate with interdisciplinary team   - Patient/family teaching  - Consider wound care consult   Outcome: Progressing     Problem: GENITOURINARY - ADULT  Goal: Maintains or returns to baseline urinary function  Description  INTERVENTIONS:  - Assess urinary function  - Encourage oral fluids to ensure adequate hydration if ordered  - Administer IV fluids as ordered to ensure adequate hydration  - Administer ordered medications as needed  - Offer frequent toileting  - Follow urinary retention protocol if ordered  Outcome: Progressing  Goal: Absence of urinary retention  Description  INTERVENTIONS:  - Assess patients ability to void and empty bladder  - Monitor I/O  - Bladder scan as needed  - Discuss with physician/AP medications to alleviate retention as needed  - Discuss catheterization for long term situations as appropriate  Outcome: Progressing  Goal: Urinary catheter remains patent  Description  INTERVENTIONS:  - Assess patency of urinary catheter  - If patient has a chronic krishnan, consider changing catheter if non-functioning  - Follow guidelines for intermittent irrigation of non-functioning urinary catheter  Outcome: Progressing     Problem: METABOLIC, FLUID AND ELECTROLYTES - ADULT  Goal: Electrolytes maintained within normal limits  Description  INTERVENTIONS:  - Monitor labs and assess patient for signs and symptoms of electrolyte imbalances  - Administer electrolyte replacement as ordered  - Monitor response to electrolyte replacements, including repeat lab results as appropriate  - Instruct patient on fluid and nutrition as appropriate  Outcome: Progressing  Goal: Fluid balance maintained  Description  INTERVENTIONS:  - Monitor labs   - Monitor I/O and WT  - Instruct patient on fluid and nutrition as appropriate  - Assess for signs & symptoms of volume excess or deficit  Outcome: Progressing  Goal: Glucose maintained within target range  Description  INTERVENTIONS:  - Monitor Blood Glucose as ordered  - Assess for signs and symptoms of hyperglycemia and hypoglycemia  - Administer ordered medications to maintain glucose within target range  - Assess nutritional intake and initiate nutrition service referral as needed  Outcome: Progressing

## 2019-10-15 NOTE — ASSESSMENT & PLAN NOTE
· Now resolved  · Patient recovered from flash pulmonary edema, stepped down from ICU  · This was likely 2/2 hypertension and volume overload  · MIKE on stage 5 CKD   · Echo EF 52%, moderate hypokinesis, peak PA pressure 61 mm Hg signifying moderate to severe pulmonary hypertension  · Resolved with IV Lasix, now transitioned to p o   Diuretic per Nephrology  · No further respiratory complaints at this time

## 2019-10-15 NOTE — DISCHARGE SUMMARY
Discharge- Ed Yates 1970, 51 y o  male MRN: [id number]    Unit/Bed#: E5 -01 Encounter: [id number]    Primary Care Provider: Ector Lagos DO   Date and time admitted to hospital: 10/11/2019 10:53 AM        * Acute respiratory failure ([address] )  Assessment & Plan  · Now resolved  · Patient recovered from flash pulmonary edema, stepped down from ICU  · This was likely 2/2 hypertension and volume overload  · MIKE on stage 5 CKD   · Echo EF 52%, moderate hypokinesis, peak PA pressure 61 mm Hg signifying moderate to severe pulmonary hypertension  · Resolved with IV Lasix, now transitioned to p o  Diuretic per Nephrology  · No further respiratory complaints at this time    Acute kidney injury [facility])  Assessment & Plan  · MIKE POA with CKD stage 5 and history of kidney transplant  · Creatinine is stable, probably new baseline  · He is agreeable to renal replacement therapy if needed  · Patient need to follow up with Ferdinand Scheuermann and said he has an upcoming appointment in about a week  · Spoke with Nephrology team and suggested patient is stable and can be discharged and be followed up as an outpatient  · Follow with primary care physician within 5-7 days post discharge to check renal function and serum electrolyte    Stage 5 chronic kidney disease not on chronic dialysis [facility])  Assessment & Plan  · History of renal transplant 1998 and 2001 with  Alex    · Continue immunosuppressive therapies  · No uremic signs  · Follow-up with [facility] transplant unit and Nephrology as an outpatient        Hypertension  Assessment & Plan  · Presented with hypertensive urgency and flash pulmonary edema  · BP stable now  · Continue nifedipine, torsemide, metoprolol      Anemia  Assessment & Plan  Hemoglobin dropped to 6 7  History of upper GI bleeding  Previous upper GI endoscopy showed duodenitis and esophagitis  Baseline hemoglobin 7-8  1 unit PRBC transfused yesterday, posttransfusion hemoglobin 8 5  Follow-up with GI with for repeat endoscopy as an outpatient  Follow-up CBC in 1 week and follow it with primary care physician      Type 1 diabetes mellitus [facility])  Assessment & Plan  Lab Results   Component Value Date    HGBA1C 4 8 10/11/2019       Recent Labs     10/14/19  1608 10/14/19  2042 10/15/19  0658 10/15/19  1138   POCGLU 189* 249* 225* 213*   Optimal blood glucose control  Continue with insulin pump follow-up with Endocrinology and primary care physician as an outpatient    Blood Sugar Average: Last 72 hrs:  (P) 181 4   Continue insulin and Accu-Cheks      Discharging Physician / Practitioner: Wei Richardson MD  PCP: Clarisse Sotelo DO  Admission Date:   Admission Orders (From admission, onward)     Ordered        10/11/19 1133  Inpatient Admission  Once                   Discharge Date: 10/15/19    Disposition:      Other: Home    For Discharges to [facility]:   · Not Applicable to this Patient - Not Applicable to this Patient    Reason for Admission:  Acute respiratory distress    Discharge Diagnoses:     Please see assessment and plan section above for further details regarding discharge diagnoses  Resolved Problems  Date Reviewed: 10/13/2019    None          Consultations During Hospital Stay:  Jada Rodgers TO NEPHROLOGY     Procedures Performed:   * No surgery found *      Xr Chest Portable    Result Date: 10/11/2019  Narrative: CHEST INDICATION:   Shortness of breath    COMPARISON:  Radiograph of the chest on September 23, 2019  EXAM PERFORMED/VIEWS:  XR CHEST PORTABLE FINDINGS:  Interval removal of left-sided central venous catheter, endotracheal, and enteric tubes  The cardiac silhouette is enlarged but stable in size  Redemonstration of aortic arch calcification  Increased opacity overlying the right lower lung field compatible with pneumonia in the appropriate clinical setting  There is pulmonary vascular congestion  No pneumothorax or pleural effusion   Osseous structures appear within normal limits for patient age  Impression: 1  Large increased opacity overlying the right lower lung field compatible with pneumonia in the appropriate clinical setting  2   Cardiomegaly and pulmonary vascular congestion  Workstation performed: [id number]     Xr Chest 1 View Portable    Result Date: 9/24/2019  Narrative: CHEST INDICATION:   ETT confirmation  COMPARISON:  None EXAM PERFORMED/VIEWS:  XR CHEST PORTABLE FINDINGS:  Right IJ catheter with tip at the cavoatrial junction  Appropriately positioned endotracheal tube with tip 3 3 cm from the james  Enteric tube tip projects over the gastroesophageal junction and side port projects over the distal esophagus  Cardial mediastinal silhouette and pulmonary vasculature are normal when accounting for AP technique and patient rotation  Increasing patchy opacities at the right lung base  No left-sided consolidation  No pneumothorax or effusion  Osseous structures appear within normal limits for patient age  Impression: 1  Enteric tube tip projects in the region of the gastroesophageal junction  Side-port is in the distal esophagus  Consider advancing  2   Patchy right basilar consolidations concerning for pneumonia or aspiration pneumonitis again noted  3   Appropriately positioned endotracheal tube and right IJ catheter  Note: The study was marked in EPIC for significant notification  Workstation performed: [id number]     Xr Chest 1 View Portable    Result Date: 9/24/2019  Narrative: CHEST INDICATION:   Central line placement  COMPARISON:  None EXAM PERFORMED/VIEWS:  XR CHEST PORTABLE FINDINGS:  Right IJ catheter tip at the cavoatrial junction  Improved aeration  Decreasing right basilar consolidation though there is still an opacity obscuring branching of the right infrahilar vasculature  No left-sided consolidation  No pneumothorax or effusion  Normal cardiomediastinal silhouette and pulmonary vasculature when accounting for patient rotation   Bones are unremarkable  Impression: Some of the atelectasis at the right lung base has resolved with improved inspiratory effort but there is still a subtle opacity obscuring vascular branching concerning for right basilar pneumonia  Appropriately positioned IJ catheter  Note: I agree with the preliminary interpretation by the ED care provider documented in Epic  Workstation performed: [id number]     Xr Chest Portable    Result Date: 9/24/2019  Narrative: CHEST INDICATION:   tachypneic, coarse breath sounds  COMPARISON:  None EXAM PERFORMED/VIEWS:  XR CHEST PORTABLE FINDINGS: Basilar consolidation in the right lung, potentially in the middle lobe  No left-sided consolidation  No pneumothorax or effusion  Normal cardiomediastinal silhouette and pulmonary vasculature  Bones are unremarkable  Impression: Right basilar consolidation could represent atelectasis or developing pneumonia  Note: I agree with the preliminary interpretation by the ED care provider documented in Epic  Workstation performed: [id number]     [facility] Kidney And Bladder    Result Date: 10/15/2019  Narrative: RENAL ULTRASOUND INDICATION:   Renal insufficiency    COMPARISON: 12/19/2018 TECHNIQUE:   Ultrasound of the retroperitoneum was performed with a curvilinear transducer utilizing volumetric sweeps and still imaging techniques  FINDINGS: KIDNEYS: Right lower quadrant renal transplant is noted measuring 10 0 x 5 6 cm  No hydronephrosis is identified  No masses are present  URETERS: Nonvisualized  BLADDER: Normally distended  No focal thickening or mass lesions  Bilateral ureteral jets detected  Mild bladder wall thickening is present  Impression: Normal-appearing right lower quadrant renal transplant  Mild bladder wall thickening   Workstation performed: [id number]        Medication Adjustments and Discharge Medications:  · Summary of Medication Adjustments made as a result of this hospitalization:  Blood pressure medication adjusted with nifedipine, metoprolol, torsemide  · Medication Dosing Tapers - Please refer to Discharge Medication List for details on any medication dosing tapers (if applicable to patient)  · Discharge Medication List: See after visit summary for reconciled discharge medications  Wound Care Recommendations:  When applicable, please see wound care section of After Visit Summary  Diet Recommendations at Discharge:  Diet -        Diet Orders   (From admission, onward)             Start     Ordered    10/13/19 1611  Room Service  Once     Question:  Type of Service  Answer:  Room Service-Appropriate    10/13/19 1610    10/12/19 0851  Diet Ramirez/CHO Controlled; Consistent Carbohydrate Diet Level 3 (6 carb servings/90 grams CHO/meal); Renal Restrictive; No; No  Diet effective now     Question Answer Comment   Diet Type Ramirez/CHO Controlled    Ramirez/CHO Controlled Consistent Carbohydrate Diet Level 3 (6 carb servings/90 grams CHO/meal)    Other Restriction(s): Renal Restrictive    Should patient have a fluid restriction? No    Should patient have a protein modifier? No    RD to adjust diet per protocol? Yes        10/12/19 0850                  Incidental Findings:   · None     Test Results Pending at Discharge (will require follow up):   · CBC and BMP in 1 week         Hospital Course:     Thom Godoy is a 51 y o  male patient who originally presented to the hospital on 10/11/2019 due to acute respiratory distress  Patient found to have acute pulmonary edema with hypertensive urgency as well as MIKE on CKD  Blood pressure was managed and patient was safely downgraded from ICU to regular floor  Patient progressively has improved  Creatinine at baseline was around 4 5 and was elevated to mid 6 which later improved to mid 5s  Patient has no uremic symptom and volume status was stable throughout his stay  Patient has history of renal transplant in the past on immunosuppressive therapy   Patient is agreeable for renal replacement therapy if needed  Nephrology team evaluated patient and suggested for outpatient follow up at transplant Center in [facility] at [address] and with his nephrologist of as an outpatient  Patient had anemia of hemoglobin 6 7 for which 1 unit of PRBC transfused  Posttransfusion hemoglobin was 8 5  Day of discharge patient had no symptoms and admits for upcoming appointment with his nephrologist in a week time  Patient advised to follow up with his primary care physician within 5-7 days post discharge and do a blood work in about a week  Condition at Discharge: stable     Discharge Day Visit / Exam:     Subjective:  No complaints  Vitals: Blood Pressure: 122/65 (10/15/19 0710)  Pulse: 66 (10/15/19 0710)  Temperature: 98 3 °F (36 8 °C) (10/15/19 0710)  Temp Source: Temporal (10/15/19 0710)  Respirations: 18 (10/15/19 0710)  Height: 5' (152 4 cm) (10/12/19 0400)  Weight - Scale: 54 kg (119 lb 0 8 oz) (10/14/19 0600)  SpO2: 95 % (10/15/19 0710)  Exam:     General appearance: alert, appears stated age and cooperative  Head: Normocephalic, without obvious abnormality, atraumatic  Lungs: clear to auscultation bilaterally  Heart: regular rate and rhythm  Abdomen: soft, non-tender, positive bowel sounds   Back: negative  Extremities: Right AKA, minimal pretibial and pedal edema  Neurologic: Grossly normal      Discharge instructions/Information to patient and family:   See after visit summary section titled Discharge Instructions for information provided to patient and family  Planned Readmission:  No      Discharge Statement:  I spent 35 minutes discharging the patient  This time was spent on the day of discharge  I had direct contact with the patient on the day of discharge  Greater than 50% of the total time was spent examining patient, answering all patient questions, arranging and discussing plan of care with patient as well as directly providing post-discharge instructions    Additional time then spent on discharge activities      ** Please Note: This note has been constructed using a voice recognition system **

## 2019-10-15 NOTE — ASSESSMENT & PLAN NOTE
· MIKE POA with CKD stage 5 and history of kidney transplant  · Creatinine is stable, probably new baseline  · He is agreeable to renal replacement therapy if needed  · Patient need to follow up with Radha Gandara and said he has an upcoming appointment in about a week    · Spoke with Nephrology team and suggested patient is stable and can be discharged and be followed up as an outpatient  · Follow with primary care physician within 5-7 days post discharge to check renal function and serum electrolyte

## 2019-10-15 NOTE — ASSESSMENT & PLAN NOTE
Hemoglobin dropped to 6 7  History of upper GI bleeding  Previous upper GI endoscopy showed duodenitis and esophagitis  Baseline hemoglobin 7-8  1 unit PRBC transfused yesterday, posttransfusion hemoglobin 8 5  Follow-up with GI with for repeat endoscopy as an outpatient  Follow-up CBC in 1 week and follow it with primary care physician

## 2019-10-15 NOTE — ASSESSMENT & PLAN NOTE
Lab Results   Component Value Date    HGBA1C 4 8 10/11/2019       Recent Labs     10/14/19  1608 10/14/19  2042 10/15/19  0658 10/15/19  1138   POCGLU 189* 249* 225* 213*   Optimal blood glucose control  Continue with insulin pump follow-up with Endocrinology and primary care physician as an outpatient    Blood Sugar Average: Last 72 hrs:  (P) 181 4   Continue insulin and Accu-Cheks

## 2019-10-15 NOTE — ASSESSMENT & PLAN NOTE
· Presented with hypertensive urgency and flash pulmonary edema  · BP stable now  · Continue nifedipine, torsemide, metoprolol

## 2019-10-15 NOTE — PROGRESS NOTES
NEPHROLOGY PROGRESS NOTE   Orestes Hernández 51 y o  male MRN: [id number]  Unit/Bed#: E5 -01 Encounter: [id number]      ASSESSMENT & PLAN:  This is a 70-year-old gentleman with history of kidney transplant x2, last kidney transplant in 2001, history of is left cell transplantation in 2007, type 2 diabetes, coronary artery disease who was admitted with respiratory distress and volume overload  1  Acute kidney injury (POA) on top of chronic kidney disease 5, serum creatinine seems to be in the mid 4, follows with [facility] Dr Maylin Marx  Allograft function unfortunately is not improving, his serum creatinine is 5 49 today  Patient is currently asymptomatic and denies any symptoms  No urgent indication for dialysis at this moment  Patient would like to consider PD when needed  Would like to go home and continue with close follow-up with NOLA Johnson, he states he had an appointment this coming Monday  Discussed about signs and symptoms to look for worsening kidney function including shortness of breath, decreased urine output, nausea, vomiting  2  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  On Imuran, tacrolimus and prednisone  [facility]  3  Azotemia, BUN is stable at 129  Patient without any uremic symptoms  4  Anemia, multifactorial, status post blood transfusion, hemoglobin improved  5  Hemodynamics, blood pressure currently stable, avoid relative hypotension    6  Volume overload, overall improving, currently on room air, is asymptomatic, continue with diuresis  My plan and recommendation with discussed with primary team Dr Mary Turner  Currently patient states is asymptomatic and he wants to go home, long discussion with patient about his worsening allograft function and the fact that he is close of needing dialysis   He expressed understanding, but he would like to go home and continue follow-up with [facility], he states he had an appointment this coming Monday  Once again discussed with patient about signs and symptoms to look for that can represent worsening kidney function  SUBJECTIVE:  Patient seen and examined, denies any complaints, chest pain or shortness of breath, no abdominal pain, nausea or vomiting, states his appetite is good, denies any urinary problems  Patient states wants to go home and continue follow-up with his nephrologist at [facility]      OBJECTIVE:  Current Weight: Weight - Scale: 54 kg (119 lb 0 8 oz)  Vitals:    10/15/19 0710   BP: 122/65   Pulse: 66   Resp: 18   Temp: 98 3 °F (36 8 °C)   SpO2: 95%       Intake/Output Summary (Last 24 hours) at 10/15/2019 1050  Last data filed at 10/15/2019 0608  Gross per 24 hour   Intake --   Output 1150 ml   Net -1150 ml     General: conscious, cooperative, in not acute distress  Eyes: conjunctivae pale, anicteric sclerae  ENT: lips and mucous membranes moist  Neck: supple, no JVD  Chest: clear breath sounds bilateral, no crackles, ronchus or wheezings  CVS: distinct S1 & S2, normal rate, regular rhythm  Abdomen: soft, non-tender, non-distended, normoactive bowel sounds, allograft nontender  Extremities: + edema of both legs, right AKA  Skin: no rash  Neuro: awake, alert, oriented      Medications:    Current Facility-Administered Medications:     atorvastatin (LIPITOR) tablet 80 mg, 80 mg, Oral, After GAVINO Alexander, 80 mg at 10/14/19 1703    azaTHIOprine (IMURAN) tablet 50 mg, 50 mg, Oral, Daily, GAVINO Dickinson, 50 mg at 10/15/19 7044    calcitriol (ROCALTROL) capsule 1 mcg, 1 mcg, Oral, Daily, Kaila Baca MD, 1 mcg at 10/15/19 6840    cholecalciferol (VITAMIN D3) tablet 1,000 Units, 1,000 Units, Oral, Daily, Kaila Baca MD, 1,000 Units at 10/15/19 0923    heparin (porcine) subcutaneous injection 5,000 Units, 5,000 Units, Subcutaneous, Q8H [address], GAVINO Dickinson, 5,000 Units at 10/15/19 0525    hydrALAZINE (APRESOLINE) injection 10 mg, 10 mg, Intravenous, Q6H PRN, Ashely Estevez PA-C    insulin detemir (LEVEMIR) subcutaneous injection 6 Units, 6 Units, Subcutaneous, QAM, GAVINO Olvera, 6 Units at 10/15/19 5236    insulin detemir (LEVEMIR) subcutaneous injection 7 Units, 7 Units, Subcutaneous, HS, GAVINO Olvera, 7 Units at 10/14/19 2123    insulin lispro (HumaLOG) 100 units/mL subcutaneous injection 2-12 Units, 2-12 Units, Subcutaneous, TID With Meals, 4 Units at 10/15/19 0739 **AND** Fingerstick Glucose (POCT), , , TID AC, GAVINO Olvera    levalbuterol [facility]) inhalation solution 1 25 mg, 1 25 mg, Nebulization, Q8H PRN, GAVINO Olvera    metoprolol succinate (TOPROL-XL) 24 hr tablet 50 mg, 50 mg, Oral, Daily, GAVINO Olvera, 50 mg at 10/15/19 3846    NIFEdipine (PROCARDIA XL) 24 hr tablet 30 mg, 30 mg, Oral, Daily, GAVINO Olvera, 30 mg at 10/15/19 1605    pantoprazole (PROTONIX) EC tablet 40 mg, 40 mg, Oral, Early Morning, [place], DO, 40 mg at 10/15/19 0524    predniSONE tablet 5 mg, 5 mg, Oral, Daily, GAVINO Olvera, 5 mg at 10/15/19 6618    sevelamer (RENAGEL) tablet 800 mg, 800 mg, Oral, TID With Meals, GAVINO Olvera, 800 mg at 10/15/19 0740    tacrolimus (PROGRAF) capsule 2 mg, 2 mg, Oral, QPM, GAVINO Olvera, 2 mg at 10/14/19 2123    tacrolimus (PROGRAF) capsule 3 mg, 3 mg, Oral, Daily, GAVINO Olvera, 3 mg at 10/15/19 2003    tamsulosin (FLOMAX) capsule 0 4 mg, 0 4 mg, Oral, Daily With Jenny Yolasabi Estevez PA-C, 0 4 mg at 10/14/19 1659    torsemide (DEMADEX) tablet 20 mg, 20 mg, Oral, BID (diuretic), Zachariah Carrion MD, 20 mg at 10/15/19 0740    Invasive Devices:        Lab Results:   Results from last 7 days   Lab Units 10/15/19  0528 10/14/19  0711 10/13/19  0625 10/12/19  0416 10/11/19  0346   WBC Thousand/uL 8 19 9 96 9 85 9 29 14 61*   HEMOGLOBIN g/dL 8 5* 6 7* 7 5* 7 0* 8 1*   HEMATOCRIT % 26 7* 21 0* 24 2* 22 7* 26 0*   PLATELETS Thousands/uL 320 307 334 261 436*   SODIUM mmol/L 135* 138 136 139 141   POTASSIUM mmol/L 4 2 4 1 4 5 4 3 5 2   CHLORIDE mmol/L 99* 101 98* 100 102   CO2 mmol/L 24 24 26 27 27   BUN mg/dL 121* 126* 120* 110* 93*   CREATININE mg/dL 5 49* 5 39* 5 28* 5 18* 4 76*   CALCIUM mg/dL 8 2* 7 9* 8 1* 8 0* 8 0*   MAGNESIUM mg/dL  --   --   --   --  2 2   PHOSPHORUS mg/dL  --  6 3* 6 6* 7 0*  --    ALK PHOS U/L  --  81 96  --  161*   ALT U/L  --  27 36  --  55   AST U/L  --  44 53*  --  115*           Portions of the record may have been created with voice recognition software  Occasional wrong word or "sound a like" substitutions may have occurred due to the inherent limitations of voice recognition software  Read the chart carefully and recognize, using context, where substitutions have occurred  If you have any questions, please contact the dictating provider

## 2019-10-16 ENCOUNTER — PATIENT OUTREACH (OUTPATIENT)
Dept: FAMILY MEDICINE CLINIC | Facility: CLINIC | Age: 49
End: 2019-10-16

## 2019-10-16 ENCOUNTER — TRANSITIONAL CARE MANAGEMENT (OUTPATIENT)
Dept: FAMILY MEDICINE CLINIC | Facility: CLINIC | Age: 49
End: 2019-10-16

## 2019-10-16 LAB — BACTERIA BLD CULT: NORMAL

## 2019-10-16 NOTE — UTILIZATION REVIEW
Notification of Discharge  This is a Notification of Discharge from our facility [address]  Please be advised that this patient has been discharge from our facility  Below you will find the admission and discharge date and time including the patients disposition  PRESENTATION DATE: 10/11/2019 10:53 AM    IP ADMISSION DATE: 10/11/19 1053   DISCHARGE DATE: 10/15/2019  1:03 PM  DISPOSITION: Home/Self Care Home/Self Care   Admission Orders listed below:  Admission Orders (From admission, onward)     Ordered        10/11/19 1133  Inpatient Admission  Once                   Please contact the UR Department if additional information is required to close this patient's authorization/case  [facility] Utilization Review Department  Phone: [phone]; Fax [phone]  [email or web address]  ATTENTION: Please call with any questions or concerns to [phone]  and carefully listen to the prompts so that you are directed to the right person  Send all requests for admission clinical reviews, approved or denied determinations and any other requests to fax [phone]   All voicemails are confidential

## 2019-10-16 NOTE — PROGRESS NOTES
Second telephone outreach   Spoke with Patient who reports he is doing well and declines case management  and further outreach calls  He does have this CM contact number and is aware he can reach me thru pcp office if needs change

## 2019-10-18 ENCOUNTER — LAB (OUTPATIENT)
Dept: LAB | Facility: MEDICAL CENTER | Age: 49
End: 2019-10-18
Payer: COMMERCIAL

## 2019-10-18 ENCOUNTER — OFFICE VISIT (OUTPATIENT)
Dept: FAMILY MEDICINE CLINIC | Facility: CLINIC | Age: 49
End: 2019-10-18
Payer: COMMERCIAL

## 2019-10-18 VITALS
WEIGHT: 119 LBS | DIASTOLIC BLOOD PRESSURE: 90 MMHG | SYSTOLIC BLOOD PRESSURE: 208 MMHG | HEIGHT: 60 IN | BODY MASS INDEX: 23.36 KG/M2

## 2019-10-18 DIAGNOSIS — E16.2 HYPOGLYCEMIA: ICD-10-CM

## 2019-10-18 DIAGNOSIS — Z23 NEED FOR DIPHTHERIA, TETANUS, ACELLULAR PERTUSSIS AND HAEMOPHILUS INFLUENZAE VACCINE: ICD-10-CM

## 2019-10-18 DIAGNOSIS — G47.33 OBSTRUCTIVE SLEEP APNEA HYPOPNEA, SEVERE: ICD-10-CM

## 2019-10-18 DIAGNOSIS — R91.8 RIGHT LOWER LOBE PULMONARY INFILTRATE: ICD-10-CM

## 2019-10-18 DIAGNOSIS — D63.8 ANEMIA OF CHRONIC DISEASE: Primary | ICD-10-CM

## 2019-10-18 DIAGNOSIS — Z23 NEED FOR INFLUENZA VACCINATION: ICD-10-CM

## 2019-10-18 DIAGNOSIS — D63.8 ANEMIA OF CHRONIC DISEASE: ICD-10-CM

## 2019-10-18 LAB
ANION GAP SERPL CALCULATED.3IONS-SCNC: 11 MMOL/L (ref 4–13)
BASOPHILS # BLD AUTO: 0.07 THOUSANDS/ΜL (ref 0–0.1)
BASOPHILS NFR BLD AUTO: 1 % (ref 0–1)
BUN SERPL-MCNC: 116 MG/DL (ref 5–25)
CALCIUM SERPL-MCNC: 9 MG/DL (ref 8.3–10.1)
CHLORIDE SERPL-SCNC: 102 MMOL/L (ref 100–108)
CO2 SERPL-SCNC: 29 MMOL/L (ref 21–32)
CREAT SERPL-MCNC: 5.62 MG/DL (ref 0.6–1.3)
EOSINOPHIL # BLD AUTO: 0.32 THOUSAND/ΜL (ref 0–0.61)
EOSINOPHIL NFR BLD AUTO: 3 % (ref 0–6)
ERYTHROCYTE [DISTWIDTH] IN BLOOD BY AUTOMATED COUNT: 15.8 % (ref 11.6–15.1)
GFR SERPL CREATININE-BSD FRML MDRD: 11 ML/MIN/1.73SQ M
GLUCOSE SERPL-MCNC: 89 MG/DL (ref 65–140)
HCT VFR BLD AUTO: 26.8 % (ref 36.5–49.3)
HGB BLD-MCNC: 8.4 G/DL (ref 12–17)
IMM GRANULOCYTES # BLD AUTO: 0.04 THOUSAND/UL (ref 0–0.2)
IMM GRANULOCYTES NFR BLD AUTO: 0 % (ref 0–2)
LYMPHOCYTES # BLD AUTO: 2.44 THOUSANDS/ΜL (ref 0.6–4.47)
LYMPHOCYTES NFR BLD AUTO: 20 % (ref 14–44)
MCH RBC QN AUTO: 29.7 PG (ref 26.8–34.3)
MCHC RBC AUTO-ENTMCNC: 31.3 G/DL (ref 31.4–37.4)
MCV RBC AUTO: 95 FL (ref 82–98)
MONOCYTES # BLD AUTO: 0.97 THOUSAND/ΜL (ref 0.17–1.22)
MONOCYTES NFR BLD AUTO: 8 % (ref 4–12)
NEUTROPHILS # BLD AUTO: 8.1 THOUSANDS/ΜL (ref 1.85–7.62)
NEUTS SEG NFR BLD AUTO: 68 % (ref 43–75)
NRBC BLD AUTO-RTO: 0 /100 WBCS
PLATELET # BLD AUTO: 318 THOUSANDS/UL (ref 149–390)
PMV BLD AUTO: 9.1 FL (ref 8.9–12.7)
POTASSIUM SERPL-SCNC: 4.6 MMOL/L (ref 3.5–5.3)
RBC # BLD AUTO: 2.83 MILLION/UL (ref 3.88–5.62)
SODIUM SERPL-SCNC: 142 MMOL/L (ref 136–145)
WBC # BLD AUTO: 11.94 THOUSAND/UL (ref 4.31–10.16)

## 2019-10-18 PROCEDURE — 90682 RIV4 VACC RECOMBINANT DNA IM: CPT | Performed by: FAMILY MEDICINE

## 2019-10-18 PROCEDURE — 90471 IMMUNIZATION ADMIN: CPT | Performed by: FAMILY MEDICINE

## 2019-10-18 PROCEDURE — 36415 COLL VENOUS BLD VENIPUNCTURE: CPT

## 2019-10-18 PROCEDURE — 80048 BASIC METABOLIC PNL TOTAL CA: CPT

## 2019-10-18 PROCEDURE — 99496 TRANSJ CARE MGMT HIGH F2F 7D: CPT | Performed by: FAMILY MEDICINE

## 2019-10-18 PROCEDURE — 85025 COMPLETE CBC W/AUTO DIFF WBC: CPT

## 2019-10-18 NOTE — PROGRESS NOTES
Assessment/Plan: needs cxr, bmp,cbc,sleep study     There are no diagnoses linked to this encounter  Subjective:     Patient ID: Leidy Donovan is a 51 y o  male  Neela Kellie is here today for a transition of care visit he was hospitalized with flash pulmonary edema and a possible right lower lobe pneumonia on that needs to be room recheck it with another chest x-ray he needs a BMP his sugars have also been going a little bit lower going to cut his Levemir back to 3 twice daily 3 units twice daily instead of the 7 that he is currently taking on patient also has elevated blood pressure today with any increase his Procardia to 30 mg twice daily he is going to be visiting [facility] on Monday and I will defer the hypertension treatment to their judgment on all give him a slip also for a chest x-ray 1 more issue for this patient is that while in the hospital he was observed by the nursing staff to have episodes of apnea most likely of sleep obstruction his wife states that he also has frequent episodes of apnea lasting anywhere from 5-10 seconds and then he also has what sounds like Cheyne-Miller respirations but I am so highly suspicious the patient has obstructive sleep apnea which could be contributing to his hypertension and he may actually have central hypopnea which would necessitate BiPAP he needs a formal sleep study in the hospital      Review of Systems   Constitutional: Positive for activity change  Negative for appetite change, diaphoresis, fatigue and fever  HENT: Negative  Eyes: Positive for visual disturbance  Respiratory: Negative for apnea, cough, chest tightness, shortness of breath and wheezing  Cardiovascular: Negative for chest pain, palpitations and leg swelling  Gastrointestinal: Negative for abdominal distention, abdominal pain, anal bleeding, constipation, diarrhea, nausea and vomiting     Endocrine: Negative for cold intolerance, heat intolerance, polydipsia, polyphagia and polyuria  Genitourinary: Negative for difficulty urinating, dysuria, flank pain, hematuria and urgency  Musculoskeletal: Positive for gait problem  Negative for arthralgias, back pain, joint swelling and myalgias  Skin: Negative for color change, rash and wound  Allergic/Immunologic: Negative for environmental allergies, food allergies and immunocompromised state  Neurological: Negative for dizziness, seizures, syncope, speech difficulty, numbness and headaches  Hematological: Negative for adenopathy  Does not bruise/bleed easily  Psychiatric/Behavioral: Negative for agitation, behavioral problems, hallucinations, sleep disturbance and suicidal ideas  Objective:     Physical Exam   Constitutional: He is oriented to person, place, and time  He appears well-developed and well-nourished  No distress  HENT:   Head: Normocephalic  Right Ear: External ear normal    Left Ear: External ear normal    Nose: Nose normal    Mouth/Throat: Oropharynx is clear and moist    Eyes: Pupils are equal, round, and reactive to light  Conjunctivae and EOM are normal  Right eye exhibits no discharge  Left eye exhibits no discharge  No scleral icterus  Neck: Normal range of motion  No tracheal deviation present  No thyromegaly present  Cardiovascular: Normal rate, regular rhythm and normal heart sounds  Exam reveals no gallop and no friction rub  No murmur heard  Pulmonary/Chest: Effort normal and breath sounds normal  No respiratory distress  He has no wheezes  Abdominal: Soft  Bowel sounds are normal  He exhibits no mass  There is no tenderness  There is no guarding  Musculoskeletal: He exhibits no edema or deformity  Lymphadenopathy:     He has no cervical adenopathy  Neurological: He is alert and oriented to person, place, and time  No cranial nerve deficit  Skin: Skin is warm and dry  No rash noted  He is not diaphoretic  No erythema  Psychiatric: He has a normal mood and affect   Thought content normal          Vitals:    10/18/19 0711   BP: (!) 208/90   BP Location: Left arm   Patient Position: Sitting   Cuff Size: Standard   Weight: 54 kg (119 lb)   Height: 5' (1 524 m)       The following portions of the patient's history were reviewed and updated as appropriate:   He  has a past medical history of Bacteremia (12/21/2018), Cardiac arrest ([facility] ), Diabetes mellitus ([facility] ), Diabetes mellitus type 1 ([facility] ), GI bleed, Hypertension, Infection at site of external fixator pin [facility]), MI (myocardial infarction) ([facility] ), Pneumonia, Renal failure, and Renal transplant, status post (07/21/2007)    He   Patient Active Problem List    Diagnosis Date Noted    Anemia 10/14/2019    Pulmonary hypertension ([facility] ) 10/13/2019    Acute blood loss anemia 09/24/2019    Acute respiratory failure ([facility] ) 09/24/2019    S/P AKA (above knee amputation), right ([facility] ) 04/16/2019    Bacteremia 12/21/2018    Stage 5 chronic kidney disease not on chronic dialysis ([facility] ) 19/88/5264    Metabolic acidosis 23/44/6186    Hyperphosphatemia 12/20/2018    Gastrointestinal hemorrhage 12/20/2018    Severe sepsis ([facility] ) 12/19/2018    Urinary retention 09/24/2017    Chronic kidney disease, stage IV (severe) ([facility] ) 09/23/2017    Chronic osteomyelitis of tibia ([facility] ) 09/23/2017    DKA (diabetic ketoacidoses) ([facility]) 09/23/2017    Diarrhea 09/23/2017    Cellulitis of ankle 09/23/2017    Non-ST elevation myocardial infarction (NSTEMI), type 2 09/23/2017    Closed fracture of distal end of right tibia with routine healing 07/03/2017    Osteomyelitis ([facility] ) 12/07/2016    Poor circulation 12/07/2016    Sepsis ([facility] ) 10/27/2016    Acute kidney injury ([facility] ) 10/27/2016    Osteomyelitis ([facility] ) 10/26/2016    Foot pain 10/26/2016    Type 1 diabetes mellitus ([facility] ) 10/26/2016    Renal transplant, status post 10/26/2016    Immunosuppression ([facility] ) 11/04/2014    Hypertension 08/04/2010     He  has a past surgical history that includes Nephrectomy transplanted organ; GLUTAMIC ACID DECARBOXYLASE (HISTORICAL); Eye surgery; Toe amputation (Right, 10/27/2016); pr open treatment fracture distal tibia fibula (Right, 7/3/2017); CLOSED REDUCTION ANKLE (Right, 7/3/2017); Ankle fracture surgery; Ankle hardware removal (Right, 7/31/2017); Ankle hardware removal (Right, 8/17/2017); Fracture surgery; Cardiac surgery; IR PICC line (8/20/2018); IR venous line removal (10/5/2018); Esophagogastroduodenoscopy (N/A, 12/20/2018); and Leg amputation (Right, 02/01/2019)  His family history includes Coronary artery disease in his mother; Diabetes in his brother  He  reports that he has never smoked  He has never used smokeless tobacco  He reports that he drank alcohol  He reports that he does not use drugs    Current Outpatient Medications   Medication Sig Dispense Refill    albuterol (2 5 mg/3 mL) 0 083 % nebulizer solution Inhale 2 5 mg      atorvastatin (LIPITOR) 80 mg tablet Take 80 mg by mouth daily       azaTHIOprine (IMURAN) 50 mg tablet Take 50 mg by mouth      calcitriol (ROCALTROL) 0 5 MCG capsule Take 1 mcg by mouth daily Monday Wednesday, Friday      chlorhexidine (PERIDEX) 0 12 % solution Apply 15 mL to the mouth or throat every 12 (twelve) hours 120 mL 0    cholecalciferol 1000 units tablet Take 1 tablet (1,000 Units total) by mouth daily 30 tablet 0    insulin detemir (LEVEMIR) 100 units/mL subcutaneous injection Inject 7 Units under the skin 2 (two) times a day      insulin lispro (HumaLOG) 100 units/mL injection Inject 10 Units under the skin daily      metoprolol succinate (TOPROL-XL) 25 mg 24 hr tablet Take 2 tablets (50 mg total) by mouth daily 180 tablet 1    NIFEdipine (ADALAT CC) 30 MG 24 hr tablet Take 1 tablet (30 mg total) by mouth daily 30 tablet 0    pantoprazole (PROTONIX) 40 mg tablet Take 1 tablet (40 mg total) by mouth daily in the early morning 30 tablet 0    predniSONE 5 mg tablet Take 5 mg by mouth daily      sevelamer (RENAGEL) 800 mg tablet Take 1 tablet (800 mg total) by mouth 3 (three) times a day with meals 90 tablet 0    tacrolimus (PROGRAF) 1 mg capsule 3 caps in the AM, 2 caps PM/ capsule 0    torsemide (DEMADEX) 20 mg tablet Take 1 tablet (20 mg total) by mouth daily 30 tablet 0    insulin regular (HumuLIN R,NovoLIN R) infusion Infuse 0 1-30 Units/hr into a venous catheter continuous 1000 mL 0     No current facility-administered medications for this visit        Current Outpatient Medications on File Prior to Visit   Medication Sig    albuterol (2 5 mg/3 mL) 0 083 % nebulizer solution Inhale 2 5 mg    atorvastatin (LIPITOR) 80 mg tablet Take 80 mg by mouth daily     azaTHIOprine (IMURAN) 50 mg tablet Take 50 mg by mouth    calcitriol (ROCALTROL) 0 5 MCG capsule Take 1 mcg by mouth daily Monday Wednesday, Friday    chlorhexidine (PERIDEX) 0 12 % solution Apply 15 mL to the mouth or throat every 12 (twelve) hours    cholecalciferol 1000 units tablet Take 1 tablet (1,000 Units total) by mouth daily    insulin detemir (LEVEMIR) 100 units/mL subcutaneous injection Inject 7 Units under the skin 2 (two) times a day    insulin lispro (HumaLOG) 100 units/mL injection Inject 10 Units under the skin daily    metoprolol succinate (TOPROL-XL) 25 mg 24 hr tablet Take 2 tablets (50 mg total) by mouth daily    NIFEdipine (ADALAT CC) 30 MG 24 hr tablet Take 1 tablet (30 mg total) by mouth daily    pantoprazole (PROTONIX) 40 mg tablet Take 1 tablet (40 mg total) by mouth daily in the early morning    predniSONE 5 mg tablet Take 5 mg by mouth daily    sevelamer (RENAGEL) 800 mg tablet Take 1 tablet (800 mg total) by mouth 3 (three) times a day with meals    tacrolimus (PROGRAF) 1 mg capsule 3 caps in the AM, 2 caps PM/HS    torsemide (DEMADEX) 20 mg tablet Take 1 tablet (20 mg total) by mouth daily    insulin regular (HumuLIN R,NovoLIN R) infusion Infuse 0 1-30 Units/hr into a venous catheter continuous    [DISCONTINUED] dextrose 5 % Infuse 125 mL/hr into a venous catheter continuous (Patient not taking: Reported on 10/11/2019)    [DISCONTINUED] pantoprozole (PROTONIX) infusion (Continuous) Infuse 8 mg/hr into a venous catheter continuous (Patient not taking: Reported on 10/11/2019)    [DISCONTINUED] sodium bicarbonate infusion Infuse 100 mL/hr into a venous catheter continuous (Patient not taking: Reported on 10/11/2019)     No current facility-administered medications on file prior to visit  He has No Known Allergies       Transitional Care Management Review:  Lorraine Ram is a 51 y o  male here for TCM follow up  During the TCM phone call patient stated:    TCM Call (since 9/17/2019)     Date and time call was made  10/16/2019  1:26 PM    Hospital care reviewed  -- <img src='C:FILES ([place])    Patient was hospitialized at  [facility]    Date of Admission  10/11/19    Date of discharge  10/08/19    Diagnosis  Acute respiratory failure    Disposition  Home    Were the patients medications reviewed and updated  No    Current Symptoms  None      TCM Call (since 9/17/2019)     Post hospital issues  None    Should patient be enrolled in anticoag monitoring? No    Scheduled for follow up?   Yes <img src='C:FILES (X86)    Referrals needed  [address]    Did you obtain your prescribed medications  Yes    Do you need help managing your prescriptions or medications  No    Is transportation to your appointment needed  No    I have advised the patient to call PCP with any new or worsening symptoms  JACY GUTIERREZ    Living Arrangements  Spouse or Significiant other    Support System  Spouse    The type of support provided  Physical; Emotional    Do you have social support  Yes, as much as I need    Are you recieving any outpatient services  No    Are you recieving home care services  No    Are you using any community resources  No    Current waiver services  No    Have you fallen in the last 12 months  No    Interperter language line needed  No    Counseling  Patient              Zaira Nava DO

## 2019-10-19 ENCOUNTER — HOSPITAL ENCOUNTER (EMERGENCY)
Facility: HOSPITAL | Age: 49
End: 2019-10-20
Attending: EMERGENCY MEDICINE | Admitting: EMERGENCY MEDICINE
Payer: COMMERCIAL

## 2019-10-19 ENCOUNTER — APPOINTMENT (EMERGENCY)
Dept: RADIOLOGY | Facility: HOSPITAL | Age: 49
End: 2019-10-19
Payer: COMMERCIAL

## 2019-10-19 DIAGNOSIS — J81.1 PULMONARY EDEMA: ICD-10-CM

## 2019-10-19 DIAGNOSIS — E83.51 HYPOCALCEMIA: ICD-10-CM

## 2019-10-19 DIAGNOSIS — E87.2 LACTIC ACIDOSIS: ICD-10-CM

## 2019-10-19 DIAGNOSIS — I50.9 CHF (CONGESTIVE HEART FAILURE) (HCC): ICD-10-CM

## 2019-10-19 DIAGNOSIS — J18.9 PNEUMONIA: ICD-10-CM

## 2019-10-19 DIAGNOSIS — J96.02 ACUTE RESPIRATORY FAILURE WITH HYPERCAPNIA (HCC): Primary | ICD-10-CM

## 2019-10-19 LAB
ALBUMIN SERPL BCP-MCNC: 1.5 G/DL (ref 3.5–5)
ALP SERPL-CCNC: 131 U/L (ref 46–116)
ALT SERPL W P-5'-P-CCNC: 26 U/L (ref 12–78)
ANION GAP SERPL CALCULATED.3IONS-SCNC: 8 MMOL/L (ref 4–13)
APTT PPP: 31 SECONDS (ref 23–37)
AST SERPL W P-5'-P-CCNC: 55 U/L (ref 5–45)
BASOPHILS # BLD AUTO: 0.09 THOUSANDS/ΜL (ref 0–0.1)
BASOPHILS NFR BLD AUTO: 1 % (ref 0–1)
BILIRUB SERPL-MCNC: 0.4 MG/DL (ref 0.2–1)
BUN SERPL-MCNC: 78 MG/DL (ref 5–25)
CALCIUM SERPL-MCNC: 5.4 MG/DL (ref 8.3–10.1)
CHLORIDE SERPL-SCNC: 111 MMOL/L (ref 100–108)
CO2 SERPL-SCNC: 18 MMOL/L (ref 21–32)
CREAT SERPL-MCNC: 3.44 MG/DL (ref 0.6–1.3)
EOSINOPHIL # BLD AUTO: 0.22 THOUSAND/ΜL (ref 0–0.61)
EOSINOPHIL NFR BLD AUTO: 1 % (ref 0–6)
ERYTHROCYTE [DISTWIDTH] IN BLOOD BY AUTOMATED COUNT: 15.6 % (ref 11.6–15.1)
GFR SERPL CREATININE-BSD FRML MDRD: 20 ML/MIN/1.73SQ M
GLUCOSE SERPL-MCNC: 333 MG/DL (ref 65–140)
HCT VFR BLD AUTO: 29.9 % (ref 36.5–49.3)
HGB BLD-MCNC: 9.6 G/DL (ref 12–17)
IMM GRANULOCYTES # BLD AUTO: 0.06 THOUSAND/UL (ref 0–0.2)
IMM GRANULOCYTES NFR BLD AUTO: 0 % (ref 0–2)
INR PPP: 0.93 (ref 0.84–1.19)
LACTATE SERPL-SCNC: 2.8 MMOL/L (ref 0.5–2)
LYMPHOCYTES # BLD AUTO: 3.25 THOUSANDS/ΜL (ref 0.6–4.47)
LYMPHOCYTES NFR BLD AUTO: 21 % (ref 14–44)
MAGNESIUM SERPL-MCNC: 2.3 MG/DL (ref 1.6–2.6)
MCH RBC QN AUTO: 30.9 PG (ref 26.8–34.3)
MCHC RBC AUTO-ENTMCNC: 32.1 G/DL (ref 31.4–37.4)
MCV RBC AUTO: 96 FL (ref 82–98)
MONOCYTES # BLD AUTO: 1.21 THOUSAND/ΜL (ref 0.17–1.22)
MONOCYTES NFR BLD AUTO: 8 % (ref 4–12)
NEUTROPHILS # BLD AUTO: 10.56 THOUSANDS/ΜL (ref 1.85–7.62)
NEUTS SEG NFR BLD AUTO: 69 % (ref 43–75)
NRBC BLD AUTO-RTO: 0 /100 WBCS
NT-PROBNP SERPL-MCNC: ABNORMAL PG/ML
PLATELET # BLD AUTO: 472 THOUSANDS/UL (ref 149–390)
PMV BLD AUTO: 8.6 FL (ref 8.9–12.7)
POTASSIUM SERPL-SCNC: 3.4 MMOL/L (ref 3.5–5.3)
PROT SERPL-MCNC: 4.1 G/DL (ref 6.4–8.2)
PROTHROMBIN TIME: 12.5 SECONDS (ref 11.6–14.5)
RBC # BLD AUTO: 3.11 MILLION/UL (ref 3.88–5.62)
SODIUM SERPL-SCNC: 137 MMOL/L (ref 136–145)
TROPONIN I SERPL-MCNC: 0.05 NG/ML
WBC # BLD AUTO: 15.39 THOUSAND/UL (ref 4.31–10.16)

## 2019-10-19 PROCEDURE — 94664 DEMO&/EVAL PT USE INHALER: CPT

## 2019-10-19 PROCEDURE — 85610 PROTHROMBIN TIME: CPT | Performed by: EMERGENCY MEDICINE

## 2019-10-19 PROCEDURE — 99291 CRITICAL CARE FIRST HOUR: CPT

## 2019-10-19 PROCEDURE — 84484 ASSAY OF TROPONIN QUANT: CPT | Performed by: EMERGENCY MEDICINE

## 2019-10-19 PROCEDURE — 36415 COLL VENOUS BLD VENIPUNCTURE: CPT | Performed by: EMERGENCY MEDICINE

## 2019-10-19 PROCEDURE — 99292 CRITICAL CARE ADDL 30 MIN: CPT | Performed by: EMERGENCY MEDICINE

## 2019-10-19 PROCEDURE — 85025 COMPLETE CBC W/AUTO DIFF WBC: CPT | Performed by: EMERGENCY MEDICINE

## 2019-10-19 PROCEDURE — 71045 X-RAY EXAM CHEST 1 VIEW: CPT

## 2019-10-19 PROCEDURE — 83605 ASSAY OF LACTIC ACID: CPT | Performed by: EMERGENCY MEDICINE

## 2019-10-19 PROCEDURE — 94660 CPAP INITIATION&MGMT: CPT

## 2019-10-19 PROCEDURE — 93005 ELECTROCARDIOGRAM TRACING: CPT

## 2019-10-19 PROCEDURE — 85730 THROMBOPLASTIN TIME PARTIAL: CPT | Performed by: EMERGENCY MEDICINE

## 2019-10-19 PROCEDURE — 96365 THER/PROPH/DIAG IV INF INIT: CPT

## 2019-10-19 PROCEDURE — 99291 CRITICAL CARE FIRST HOUR: CPT | Performed by: EMERGENCY MEDICINE

## 2019-10-19 PROCEDURE — 83880 ASSAY OF NATRIURETIC PEPTIDE: CPT | Performed by: EMERGENCY MEDICINE

## 2019-10-19 PROCEDURE — 83735 ASSAY OF MAGNESIUM: CPT | Performed by: EMERGENCY MEDICINE

## 2019-10-19 PROCEDURE — 94760 N-INVAS EAR/PLS OXIMETRY 1: CPT

## 2019-10-19 PROCEDURE — 80053 COMPREHEN METABOLIC PANEL: CPT | Performed by: EMERGENCY MEDICINE

## 2019-10-19 RX ORDER — NITROGLYCERIN 20 MG/100ML
INJECTION INTRAVENOUS
Status: DISCONTINUED
Start: 2019-10-19 | End: 2019-10-20 | Stop reason: HOSPADM

## 2019-10-19 RX ORDER — NITROGLYCERIN 20 MG/100ML
5-200 INJECTION INTRAVENOUS
Status: DISCONTINUED | OUTPATIENT
Start: 2019-10-19 | End: 2019-10-20 | Stop reason: HOSPADM

## 2019-10-19 RX ORDER — CEFEPIME HYDROCHLORIDE 2 G/50ML
2000 INJECTION, SOLUTION INTRAVENOUS ONCE
Status: COMPLETED | OUTPATIENT
Start: 2019-10-20 | End: 2019-10-20

## 2019-10-19 RX ORDER — CALCIUM GLUCONATE 94 MG/ML
INJECTION, SOLUTION INTRAVENOUS
Status: COMPLETED
Start: 2019-10-19 | End: 2019-10-20

## 2019-10-19 RX ADMIN — NITROGLYCERIN 75 MCG/MIN: 20 INJECTION INTRAVENOUS at 23:05

## 2019-10-20 ENCOUNTER — HOSPITAL ENCOUNTER (INPATIENT)
Facility: HOSPITAL | Age: 49
LOS: 5 days | Discharge: HOME | DRG: 291 | End: 2019-10-25
Attending: EMERGENCY MEDICINE | Admitting: HOSPITALIST
Payer: COMMERCIAL

## 2019-10-20 VITALS
BODY MASS INDEX: 24.2 KG/M2 | SYSTOLIC BLOOD PRESSURE: 166 MMHG | OXYGEN SATURATION: 100 % | RESPIRATION RATE: 25 BRPM | HEART RATE: 92 BPM | WEIGHT: 123.9 LBS | DIASTOLIC BLOOD PRESSURE: 84 MMHG

## 2019-10-20 DIAGNOSIS — N18.4 TYPE 1 DIABETES MELLITUS WITH STAGE 4 CHRONIC KIDNEY DISEASE (HCC): ICD-10-CM

## 2019-10-20 DIAGNOSIS — N18.4 CHRONIC KIDNEY DISEASE, STAGE IV (SEVERE) (HCC): ICD-10-CM

## 2019-10-20 DIAGNOSIS — E10.22 TYPE 1 DIABETES MELLITUS WITH STAGE 4 CHRONIC KIDNEY DISEASE (HCC): ICD-10-CM

## 2019-10-20 DIAGNOSIS — D84.9 IMMUNOSUPPRESSION (HCC): ICD-10-CM

## 2019-10-20 DIAGNOSIS — N17.9 ACUTE KIDNEY INJURY (HCC): Primary | ICD-10-CM

## 2019-10-20 DIAGNOSIS — I15.1 HYPERTENSION SECONDARY TO OTHER RENAL DISORDERS: ICD-10-CM

## 2019-10-20 DIAGNOSIS — N28.89 HYPERTENSION SECONDARY TO OTHER RENAL DISORDERS: ICD-10-CM

## 2019-10-20 DIAGNOSIS — R09.89 DEPRESSED LEFT VENTRICULAR EJECTION FRACTION: ICD-10-CM

## 2019-10-20 DIAGNOSIS — I10 HYPERTENSION: ICD-10-CM

## 2019-10-20 PROBLEM — I50.9 ACUTE CHF (CONGESTIVE HEART FAILURE) (HCC): Status: ACTIVE | Noted: 2019-10-20

## 2019-10-20 LAB
ALBUMIN SERPL BCP-MCNC: 2.8 G/DL (ref 3.5–5)
ALP SERPL-CCNC: 157 U/L (ref 46–116)
ALT SERPL W P-5'-P-CCNC: 42 U/L (ref 12–78)
ANION GAP SERPL CALCULATED.3IONS-SCNC: 9 MMOL/L (ref 4–13)
APTT PPP: 30 SECONDS (ref 23–37)
AST SERPL W P-5'-P-CCNC: 38 U/L (ref 5–45)
BASE EX.OXY STD BLDV CALC-SCNC: 94.9 % (ref 60–80)
BASE EXCESS BLDV CALC-SCNC: -1 MMOL/L
BASOPHILS # BLD AUTO: 0.05 THOUSANDS/ΜL (ref 0–0.1)
BASOPHILS NFR BLD AUTO: 1 % (ref 0–1)
BETA-HYDROXYBUTYRATE: 0 MMOL/L
BILIRUB SERPL-MCNC: 0.53 MG/DL (ref 0.2–1)
BUN SERPL-MCNC: 123 MG/DL (ref 5–25)
CA-I BLD-SCNC: 1.21 MMOL/L (ref 1.12–1.32)
CALCIUM SERPL-MCNC: 10.1 MG/DL (ref 8.3–10.1)
CHLORIDE SERPL-SCNC: 99 MMOL/L (ref 100–108)
CO2 SERPL-SCNC: 31 MMOL/L (ref 21–32)
CREAT SERPL-MCNC: 5.99 MG/DL (ref 0.6–1.3)
EOSINOPHIL # BLD AUTO: 0.13 THOUSAND/ΜL (ref 0–0.61)
EOSINOPHIL NFR BLD AUTO: 1 % (ref 0–6)
ERYTHROCYTE [DISTWIDTH] IN BLOOD BY AUTOMATED COUNT: 15.4 % (ref 11.6–15.1)
GFR SERPL CREATININE-BSD FRML MDRD: 10 ML/MIN/1.73SQ M
GLUCOSE SERPL-MCNC: 115 MG/DL (ref 65–140)
GLUCOSE SERPL-MCNC: 171 MG/DL (ref 65–140)
GLUCOSE SERPL-MCNC: 204 MG/DL (ref 65–140)
GLUCOSE SERPL-MCNC: 339 MG/DL (ref 65–140)
GLUCOSE SERPL-MCNC: 358 MG/DL (ref 65–140)
GLUCOSE SERPL-MCNC: 394 MG/DL (ref 65–140)
GLUCOSE SERPL-MCNC: 416 MG/DL (ref 65–140)
GLUCOSE SERPL-MCNC: 47 MG/DL (ref 65–140)
GLUCOSE SERPL-MCNC: 57 MG/DL (ref 65–140)
GLUCOSE SERPL-MCNC: 60 MG/DL (ref 65–140)
GLUCOSE SERPL-MCNC: 94 MG/DL (ref 65–140)
HCO3 BLDV-SCNC: 20.4 MMOL/L (ref 24–30)
HCT VFR BLD AUTO: 22.8 % (ref 36.5–49.3)
HCT VFR BLD AUTO: 24 % (ref 36.5–49.3)
HGB BLD-MCNC: 7.3 G/DL (ref 12–17)
HGB BLD-MCNC: 7.8 G/DL (ref 12–17)
IMM GRANULOCYTES # BLD AUTO: 0.05 THOUSAND/UL (ref 0–0.2)
IMM GRANULOCYTES NFR BLD AUTO: 1 % (ref 0–2)
INR PPP: 1.09 (ref 0.84–1.19)
LACTATE SERPL-SCNC: 1.3 MMOL/L (ref 0.5–2)
LYMPHOCYTES # BLD AUTO: 1.42 THOUSANDS/ΜL (ref 0.6–4.47)
LYMPHOCYTES NFR BLD AUTO: 13 % (ref 14–44)
MAGNESIUM SERPL-MCNC: 2.3 MG/DL (ref 1.6–2.6)
MCH RBC QN AUTO: 30.3 PG (ref 26.8–34.3)
MCHC RBC AUTO-ENTMCNC: 32 G/DL (ref 31.4–37.4)
MCV RBC AUTO: 95 FL (ref 82–98)
MONOCYTES # BLD AUTO: 1.08 THOUSAND/ΜL (ref 0.17–1.22)
MONOCYTES NFR BLD AUTO: 10 % (ref 4–12)
NEUTROPHILS # BLD AUTO: 8.17 THOUSANDS/ΜL (ref 1.85–7.62)
NEUTS SEG NFR BLD AUTO: 74 % (ref 43–75)
NRBC BLD AUTO-RTO: 0 /100 WBCS
O2 CT BLDV-SCNC: 10.8 ML/DL
PCO2 BLDV: 22.8 MM HG (ref 42–50)
PH BLDV: 7.57 [PH] (ref 7.3–7.4)
PHOSPHATE SERPL-MCNC: 7.1 MG/DL (ref 2.7–4.5)
PLATELET # BLD AUTO: 299 THOUSANDS/UL (ref 149–390)
PMV BLD AUTO: 8.6 FL (ref 8.9–12.7)
PO2 BLDV: 123 MM HG (ref 35–45)
POTASSIUM SERPL-SCNC: 4.3 MMOL/L (ref 3.5–5.3)
PROCALCITONIN SERPL-MCNC: 0.33 NG/ML
PROCALCITONIN SERPL-MCNC: 0.39 NG/ML
PROT SERPL-MCNC: 5.6 G/DL (ref 6.4–8.2)
PROTHROMBIN TIME: 13.7 SECONDS (ref 11.6–14.5)
RBC # BLD AUTO: 2.41 MILLION/UL (ref 3.88–5.62)
SODIUM SERPL-SCNC: 139 MMOL/L (ref 136–145)
TACROLIMUS BLD-MCNC: 3.1 NG/ML (ref 2–20)
TROPONIN I SERPL-MCNC: 0.11 NG/ML
TROPONIN I SERPL-MCNC: 0.11 NG/ML
WBC # BLD AUTO: 10.9 THOUSAND/UL (ref 4.31–10.16)

## 2019-10-20 PROCEDURE — 85025 COMPLETE CBC W/AUTO DIFF WBC: CPT | Performed by: EMERGENCY MEDICINE

## 2019-10-20 PROCEDURE — 84100 ASSAY OF PHOSPHORUS: CPT | Performed by: EMERGENCY MEDICINE

## 2019-10-20 PROCEDURE — 83605 ASSAY OF LACTIC ACID: CPT | Performed by: EMERGENCY MEDICINE

## 2019-10-20 PROCEDURE — 36415 COLL VENOUS BLD VENIPUNCTURE: CPT | Performed by: EMERGENCY MEDICINE

## 2019-10-20 PROCEDURE — 85610 PROTHROMBIN TIME: CPT | Performed by: EMERGENCY MEDICINE

## 2019-10-20 PROCEDURE — 84145 PROCALCITONIN (PCT): CPT | Performed by: EMERGENCY MEDICINE

## 2019-10-20 PROCEDURE — 99255 IP/OBS CONSLTJ NEW/EST HI 80: CPT | Performed by: INTERNAL MEDICINE

## 2019-10-20 PROCEDURE — 80197 ASSAY OF TACROLIMUS: CPT | Performed by: EMERGENCY MEDICINE

## 2019-10-20 PROCEDURE — 94760 N-INVAS EAR/PLS OXIMETRY 1: CPT

## 2019-10-20 PROCEDURE — 85730 THROMBOPLASTIN TIME PARTIAL: CPT | Performed by: EMERGENCY MEDICINE

## 2019-10-20 PROCEDURE — 96366 THER/PROPH/DIAG IV INF ADDON: CPT

## 2019-10-20 PROCEDURE — 99291 CRITICAL CARE FIRST HOUR: CPT | Performed by: EMERGENCY MEDICINE

## 2019-10-20 PROCEDURE — 82010 KETONE BODYS QUAN: CPT | Performed by: EMERGENCY MEDICINE

## 2019-10-20 PROCEDURE — 85014 HEMATOCRIT: CPT | Performed by: PHYSICIAN ASSISTANT

## 2019-10-20 PROCEDURE — 85018 HEMOGLOBIN: CPT | Performed by: PHYSICIAN ASSISTANT

## 2019-10-20 PROCEDURE — 82805 BLOOD GASES W/O2 SATURATION: CPT | Performed by: EMERGENCY MEDICINE

## 2019-10-20 PROCEDURE — 96367 TX/PROPH/DG ADDL SEQ IV INF: CPT

## 2019-10-20 PROCEDURE — 96365 THER/PROPH/DIAG IV INF INIT: CPT

## 2019-10-20 PROCEDURE — NC001 PR NO CHARGE: Performed by: INTERNAL MEDICINE

## 2019-10-20 PROCEDURE — 84484 ASSAY OF TROPONIN QUANT: CPT | Performed by: EMERGENCY MEDICINE

## 2019-10-20 PROCEDURE — 87040 BLOOD CULTURE FOR BACTERIA: CPT | Performed by: EMERGENCY MEDICINE

## 2019-10-20 PROCEDURE — 94660 CPAP INITIATION&MGMT: CPT

## 2019-10-20 PROCEDURE — 83735 ASSAY OF MAGNESIUM: CPT | Performed by: EMERGENCY MEDICINE

## 2019-10-20 PROCEDURE — 82948 REAGENT STRIP/BLOOD GLUCOSE: CPT

## 2019-10-20 PROCEDURE — NC001 PR NO CHARGE: Performed by: EMERGENCY MEDICINE

## 2019-10-20 PROCEDURE — 93005 ELECTROCARDIOGRAM TRACING: CPT

## 2019-10-20 PROCEDURE — 80053 COMPREHEN METABOLIC PANEL: CPT | Performed by: EMERGENCY MEDICINE

## 2019-10-20 PROCEDURE — 82330 ASSAY OF CALCIUM: CPT | Performed by: EMERGENCY MEDICINE

## 2019-10-20 RX ORDER — ASPIRIN 81 MG/1
81 TABLET, CHEWABLE ORAL DAILY
Status: DISCONTINUED | OUTPATIENT
Start: 2019-10-20 | End: 2019-10-25 | Stop reason: HOSPADM

## 2019-10-20 RX ORDER — LIDOCAINE HYDROCHLORIDE 10 MG/ML
INJECTION, SOLUTION EPIDURAL; INFILTRATION; INTRACAUDAL; PERINEURAL
Status: COMPLETED
Start: 2019-10-20 | End: 2019-10-20

## 2019-10-20 RX ORDER — HEPARIN SODIUM 5000 [USP'U]/ML
5000 INJECTION, SOLUTION INTRAVENOUS; SUBCUTANEOUS EVERY 8 HOURS SCHEDULED
Status: DISCONTINUED | OUTPATIENT
Start: 2019-10-20 | End: 2019-10-25 | Stop reason: HOSPADM

## 2019-10-20 RX ORDER — FUROSEMIDE 10 MG/ML
10 SYRINGE (ML) INJECTION CONTINUOUS
Status: DISCONTINUED | OUTPATIENT
Start: 2019-10-20 | End: 2019-10-21

## 2019-10-20 RX ORDER — METOLAZONE 5 MG/1
5 TABLET ORAL ONCE
Status: COMPLETED | OUTPATIENT
Start: 2019-10-20 | End: 2019-10-20

## 2019-10-20 RX ORDER — CHLORHEXIDINE GLUCONATE 0.12 MG/ML
15 RINSE ORAL EVERY 12 HOURS SCHEDULED
Status: DISCONTINUED | OUTPATIENT
Start: 2019-10-20 | End: 2019-10-20

## 2019-10-20 RX ORDER — VANCOMYCIN HYDROCHLORIDE 500 MG/100ML
10 INJECTION, SOLUTION INTRAVENOUS EVERY 24 HOURS
Status: DISCONTINUED | OUTPATIENT
Start: 2019-10-20 | End: 2019-10-20

## 2019-10-20 RX ORDER — SEVELAMER HYDROCHLORIDE 800 MG/1
800 TABLET, FILM COATED ORAL
Status: DISCONTINUED | OUTPATIENT
Start: 2019-10-20 | End: 2019-10-25 | Stop reason: HOSPADM

## 2019-10-20 RX ORDER — VANCOMYCIN HYDROCHLORIDE 500 MG/100ML
500 INJECTION, SOLUTION INTRAVENOUS ONCE
Status: COMPLETED | OUTPATIENT
Start: 2019-10-20 | End: 2019-10-20

## 2019-10-20 RX ORDER — PREDNISONE 1 MG/1
5 TABLET ORAL DAILY
Status: DISCONTINUED | OUTPATIENT
Start: 2019-10-20 | End: 2019-10-25 | Stop reason: HOSPADM

## 2019-10-20 RX ORDER — ATORVASTATIN CALCIUM 80 MG/1
80 TABLET, FILM COATED ORAL
Status: DISCONTINUED | OUTPATIENT
Start: 2019-10-20 | End: 2019-10-25 | Stop reason: HOSPADM

## 2019-10-20 RX ORDER — METOPROLOL SUCCINATE 50 MG/1
50 TABLET, EXTENDED RELEASE ORAL DAILY
Status: DISCONTINUED | OUTPATIENT
Start: 2019-10-20 | End: 2019-10-23

## 2019-10-20 RX ORDER — DOCUSATE SODIUM 100 MG/1
100 CAPSULE, LIQUID FILLED ORAL DAILY
Status: DISCONTINUED | OUTPATIENT
Start: 2019-10-20 | End: 2019-10-25 | Stop reason: HOSPADM

## 2019-10-20 RX ORDER — FUROSEMIDE 10 MG/ML
80 INJECTION INTRAMUSCULAR; INTRAVENOUS ONCE
Status: COMPLETED | OUTPATIENT
Start: 2019-10-20 | End: 2019-10-20

## 2019-10-20 RX ORDER — LABETALOL 20 MG/4 ML (5 MG/ML) INTRAVENOUS SYRINGE
10 EVERY 6 HOURS PRN
Status: DISCONTINUED | OUTPATIENT
Start: 2019-10-20 | End: 2019-10-25 | Stop reason: HOSPADM

## 2019-10-20 RX ORDER — SENNOSIDES 8.6 MG
1 TABLET ORAL
Status: DISCONTINUED | OUTPATIENT
Start: 2019-10-20 | End: 2019-10-25 | Stop reason: HOSPADM

## 2019-10-20 RX ORDER — TACROLIMUS 1 MG/1
3 CAPSULE ORAL EVERY MORNING
Status: DISCONTINUED | OUTPATIENT
Start: 2019-10-20 | End: 2019-10-25 | Stop reason: HOSPADM

## 2019-10-20 RX ORDER — TACROLIMUS 1 MG/1
2 CAPSULE ORAL
Status: DISCONTINUED | OUTPATIENT
Start: 2019-10-20 | End: 2019-10-25 | Stop reason: HOSPADM

## 2019-10-20 RX ORDER — AZATHIOPRINE 50 MG/1
50 TABLET ORAL DAILY
Status: DISCONTINUED | OUTPATIENT
Start: 2019-10-20 | End: 2019-10-25 | Stop reason: HOSPADM

## 2019-10-20 RX ORDER — ASPIRIN 81 MG/1
81 TABLET, CHEWABLE ORAL EVERY MORNING
COMMUNITY

## 2019-10-20 RX ORDER — ALBUTEROL SULFATE 2.5 MG/3ML
2.5 SOLUTION RESPIRATORY (INHALATION) EVERY 4 HOURS PRN
Status: DISCONTINUED | OUTPATIENT
Start: 2019-10-20 | End: 2019-10-22

## 2019-10-20 RX ORDER — NITROGLYCERIN 20 MG/100ML
5-200 INJECTION INTRAVENOUS
Status: DISCONTINUED | OUTPATIENT
Start: 2019-10-20 | End: 2019-10-23

## 2019-10-20 RX ORDER — HYDRALAZINE HYDROCHLORIDE 25 MG/1
25 TABLET, FILM COATED ORAL EVERY 8 HOURS SCHEDULED
Status: DISCONTINUED | OUTPATIENT
Start: 2019-10-20 | End: 2019-10-21

## 2019-10-20 RX ORDER — PANTOPRAZOLE SODIUM 40 MG/1
40 TABLET, DELAYED RELEASE ORAL
Status: DISCONTINUED | OUTPATIENT
Start: 2019-10-20 | End: 2019-10-25 | Stop reason: HOSPADM

## 2019-10-20 RX ORDER — NIFEDIPINE 60 MG/1
60 TABLET, EXTENDED RELEASE ORAL DAILY
Status: DISCONTINUED | OUTPATIENT
Start: 2019-10-20 | End: 2019-10-21

## 2019-10-20 RX ORDER — DEXTROSE MONOHYDRATE 25 G/50ML
25 INJECTION, SOLUTION INTRAVENOUS ONCE
Status: COMPLETED | OUTPATIENT
Start: 2019-10-20 | End: 2019-10-20

## 2019-10-20 RX ADMIN — CALCIUM GLUCONATE 2 G: 98 INJECTION, SOLUTION INTRAVENOUS at 00:12

## 2019-10-20 RX ADMIN — HYDRALAZINE HYDROCHLORIDE 25 MG: 25 TABLET ORAL at 21:05

## 2019-10-20 RX ADMIN — INSULIN LISPRO 3 UNITS: 100 INJECTION, SOLUTION INTRAVENOUS; SUBCUTANEOUS at 18:30

## 2019-10-20 RX ADMIN — TACROLIMUS 3 MG: 1 CAPSULE ORAL at 09:54

## 2019-10-20 RX ADMIN — VANCOMYCIN HYDROCHLORIDE 500 MG: 500 INJECTION, SOLUTION INTRAVENOUS at 11:01

## 2019-10-20 RX ADMIN — LIDOCAINE HYDROCHLORIDE 1 ML: 10 INJECTION, SOLUTION EPIDURAL; INFILTRATION; INTRACAUDAL; PERINEURAL at 10:40

## 2019-10-20 RX ADMIN — FUROSEMIDE 80 MG: 10 INJECTION, SOLUTION INTRAMUSCULAR; INTRAVENOUS at 02:53

## 2019-10-20 RX ADMIN — ASPIRIN 81 MG 81 MG: 81 TABLET ORAL at 09:51

## 2019-10-20 RX ADMIN — DEXTROSE 50 % IN WATER (D50W) INTRAVENOUS SYRINGE 25 ML: at 12:49

## 2019-10-20 RX ADMIN — METOLAZONE 5 MG: 5 TABLET ORAL at 17:21

## 2019-10-20 RX ADMIN — METRONIDAZOLE 500 MG: 500 INJECTION, SOLUTION INTRAVENOUS at 09:52

## 2019-10-20 RX ADMIN — FUROSEMIDE 80 MG: 10 INJECTION, SOLUTION INTRAMUSCULAR; INTRAVENOUS at 05:55

## 2019-10-20 RX ADMIN — NITROGLYCERIN 160 MCG/MIN: 20 INJECTION INTRAVENOUS at 02:45

## 2019-10-20 RX ADMIN — METOPROLOL SUCCINATE 50 MG: 50 TABLET, EXTENDED RELEASE ORAL at 05:09

## 2019-10-20 RX ADMIN — INSULIN DETEMIR 7 UNITS: 100 INJECTION, SOLUTION SUBCUTANEOUS at 13:36

## 2019-10-20 RX ADMIN — CEFEPIME HYDROCHLORIDE 2000 MG: 2 INJECTION, SOLUTION INTRAVENOUS at 00:37

## 2019-10-20 RX ADMIN — SEVELAMER HYDROCHLORIDE 800 MG: 800 TABLET, FILM COATED PARENTERAL at 12:49

## 2019-10-20 RX ADMIN — SEVELAMER HYDROCHLORIDE 800 MG: 800 TABLET, FILM COATED PARENTERAL at 17:30

## 2019-10-20 RX ADMIN — INSULIN LISPRO 1 UNITS: 100 INJECTION, SOLUTION INTRAVENOUS; SUBCUTANEOUS at 18:30

## 2019-10-20 RX ADMIN — PANTOPRAZOLE SODIUM 40 MG: 40 TABLET, DELAYED RELEASE ORAL at 05:10

## 2019-10-20 RX ADMIN — PREDNISONE 5 MG: 5 TABLET ORAL at 09:51

## 2019-10-20 RX ADMIN — CEFEPIME HYDROCHLORIDE 1000 MG: 1 INJECTION, POWDER, FOR SOLUTION INTRAMUSCULAR; INTRAVENOUS at 13:36

## 2019-10-20 RX ADMIN — Medication 2 G: at 00:14

## 2019-10-20 RX ADMIN — AZATHIOPRINE 50 MG: 50 TABLET ORAL at 09:54

## 2019-10-20 RX ADMIN — NIFEDIPINE 60 MG: 60 TABLET, FILM COATED, EXTENDED RELEASE ORAL at 05:09

## 2019-10-20 RX ADMIN — SEVELAMER HYDROCHLORIDE 800 MG: 800 TABLET, FILM COATED PARENTERAL at 09:54

## 2019-10-20 RX ADMIN — SODIUM CHLORIDE 9 UNITS/HR: 9 INJECTION, SOLUTION INTRAVENOUS at 04:23

## 2019-10-20 RX ADMIN — METRONIDAZOLE 500 MG: 500 INJECTION, SOLUTION INTRAVENOUS at 17:30

## 2019-10-20 RX ADMIN — TACROLIMUS 2 MG: 1 CAPSULE ORAL at 21:05

## 2019-10-20 RX ADMIN — ATORVASTATIN CALCIUM 80 MG: 80 TABLET, FILM COATED ORAL at 17:21

## 2019-10-20 RX ADMIN — Medication 10 MG/HR: at 09:52

## 2019-10-20 RX ADMIN — VANCOMYCIN HYDROCHLORIDE 500 MG: 500 INJECTION, SOLUTION INTRAVENOUS at 05:14

## 2019-10-20 RX ADMIN — HEPARIN SODIUM 5000 UNITS: 5000 INJECTION INTRAVENOUS; SUBCUTANEOUS at 21:05

## 2019-10-20 RX ADMIN — NITROGLYCERIN 200 MCG/MIN: 20 INJECTION INTRAVENOUS at 07:04

## 2019-10-20 RX ADMIN — HEPARIN SODIUM 5000 UNITS: 5000 INJECTION INTRAVENOUS; SUBCUTANEOUS at 05:13

## 2019-10-20 RX ADMIN — CHLORHEXIDINE GLUCONATE 0.12% ORAL RINSE 15 ML: 1.2 LIQUID ORAL at 09:52

## 2019-10-20 RX ADMIN — HEPARIN SODIUM 5000 UNITS: 5000 INJECTION INTRAVENOUS; SUBCUTANEOUS at 14:43

## 2019-10-20 RX ADMIN — DOCUSATE SODIUM 100 MG: 100 CAPSULE, LIQUID FILLED ORAL at 09:51

## 2019-10-20 RX ADMIN — METRONIDAZOLE 500 MG: 500 INJECTION, SOLUTION INTRAVENOUS at 01:08

## 2019-10-20 NOTE — EMTALA/ACUTE CARE TRANSFER
[facility] EMERGENCY DEPARTMENT  [address] [place] [id number]  Dept: [phone]      EMTALA TRANSFER CONSENT    NAME Susana SPENCE 1970                              MRN [id number]    I have been informed of my rights regarding examination, treatment, and transfer   by Dr Michael Parks MD    Benefits: Specialized equipment and/or services available at the receiving facility (Include comment)________________________    Risks: Potential for delay in receiving treatment, Potential deterioration of medical condition, Loss of IV, Possible worsening of condition or death during transfer, Increased discomfort during transfer      Consent for Transfer:  I acknowledge that my medical condition has been evaluated and explained to me by the emergency department physician or other qualified medical person and/or my attending physician, who has recommended that I be transferred to the service of  Accepting Physician: Dr Benjamín Costa at [facility] Name, [address] : [facility]  The above potential benefits of such transfer, the potential risks associated with such transfer, and the probable risks of not being transferred have been explained to me, and I fully understand them  The doctor has explained that, in my case, the benefits of transfer outweigh the risks  I agree to be transferred  I authorize the performance of emergency medical procedures and treatments upon me in both transit and upon arrival at the receiving facility  Additionally, I authorize the release of any and all medical records to the receiving facility and request they be transported with me, if possible  I understand that the safest mode of transportation during a medical emergency is an ambulance and that the Hospital advocates the use of this mode of transport   Risks of traveling to the receiving facility by car, including absence of medical control, life sustaining equipment, such as oxygen, and medical personnel has been explained to me and I fully understand them  (LARISSA CORRECT BOX BELOW)  [ x ]  I consent to the stated transfer and to be transported by ambulance/helicopter  [  ]  I consent to the stated transfer, but refuse transportation by ambulance and accept full responsibility for my transportation by car  I understand the risks of non-ambulance transfers and I exonerate the Hospital and its staff from any deterioration in my condition that results from this refusal     X___________________________________________    DATE  10/20/19  TIME________  Signature of patient or legally responsible individual signing on patient behalf           RELATIONSHIP TO PATIENT_________________________          Provider Certification    NAME Leidy Donovan                                        [facility] 1970                              MRN [id number]    A medical screening exam was performed on the above named patient  Based on the examination:    Condition Necessitating Transfer The primary encounter diagnosis was Acute respiratory failure with hypercapnia ([address] )  Diagnoses of Hypocalcemia, Pulmonary edema, CHF (congestive heart failure) ([address] ), Pneumonia, and Lactic acidosis were also pertinent to this visit      Patient Condition: The patient has been stabilized such that within reasonable medical probability, no material deterioration of the patient condition or the condition of the unborn child(tiesha) is likely to result from the transfer    Reason for Transfer: Level of Care needed not available at this facility    Transfer Requirements: Facility [facility]   · Space available and qualified personnel available for treatment as acknowledged by    · Agreed to accept transfer and to provide appropriate medical treatment as acknowledged by       Dr Kiana Harvey  · Appropriate medical records of the examination and treatment of the patient are provided at the time of transfer   STAFF INITIAL WHEN COMPLETED _______  · Transfer will be performed by qualified personnel from    and appropriate transfer equipment as required, including the use of necessary and appropriate life support measures  Provider Certification: I have examined the patient and explained the following risks and benefits of being transferred/refusing transfer to the patient/family:  General risk, such as traffic hazards, adverse weather conditions, rough terrain or turbulence, possible failure of equipment (including vehicle or aircraft), or consequences of actions of persons outside the control of the transport personnel, Unanticipated needs of medical equipment and personnel during transport, Risk of worsening condition, The possibility of a transport vehicle being unavailable      Based on these reasonable risks and benefits to the patient and/or the unborn child(tiesha), and based upon the information available at the time of the patients examination, I certify that the medical benefits reasonably to be expected from the provision of appropriate medical treatments at another medical facility outweigh the increasing risks, if any, to the individuals medical condition, and in the case of labor to the unborn child, from effecting the transfer      X____________________________________________ DATE 10/20/19        TIME_______      ORIGINAL - SEND TO MEDICAL RECORDS   COPY - SEND WITH PATIENT DURING TRANSFER

## 2019-10-20 NOTE — CONSULTS
Consultation    Nephrology   Sean Rodriguez 51 y o  male MRN: [id number]  Unit/Bed#: MICU 08 Encounter: [id number]    History of Present Illness   Physician Requesting Consult: Emy Palmer MD  Reason for Consult : -  acute kidney injury    ASSESSMENT/PLAN:    51 y o   male with pmh of hypertension, diabetes, CAD status post stents and CKD stage 5 T status post 2 renal transplants and multiple recent hospitalizations with volume overload and acute kidney injury who was admitted for increasing shortness of breath and volume overload initially at [facility], subsequently treated with Lasix and nitro drip and also noted to have pneumonia, subsequently transferred over to [facility] for higher care  Nephrology has been consulted for acute kidney injury management  1)Acute kidney injury (POA) on CKD stage 5 T:  - MIKE most likely secondary to renal venous congestion and likely progression to ESRD  - After review of records it appears that the patient has a baseline Creatinine of 4 5mg/dL  - patient has had multiple recent admissions with volume overload and acute kidney injury and has been reluctant in the past with regards to initiation of dialysis  - I had a detailed discussion with the patient today again that with these recurrent admissions and his renal parameters these are sign suggestive that his renal reserve is minimal to none and that it is truly time for him to be started on dialysis  He does not wish to be undergoing multiple hospitalizations and prolonged hospitalizations  So informed him that for his stability it was be best that if warranted he likely start on renal replacement therapy while he is in the hospital in the next 24-48 hours if there is not significant response to the medical management  - I discussed renal replacement therapy with him in depth    At this time he understands the risks and benefits consent for renal replacement therapy was obtained and placed in the chart on 10/20/2019  - I advised the patient that if he does not have significant diuretic response then we will likely place him on renal replacement therapy  He is agreeable  - in the long term patient wishes to pursue PD  Advised patient that that could be pursued at a later date upon discharge if he is still requiring dialysis which he likely will  And that dialysis in his situation will likely be permanent due to his chronic state of disease   - patient was admitted with a creatinine of 5 62 mg/dL  - patient's creatinine today is at 5 99 mg/dL  - clinically patient appears to be hypervolemic  - Avoid nephrotoxins, adjust meds to appropriate GFR   - Acid base and lytes stable except hyperphosphatemia, alkalosis  - Clinically patient is not uremic and there is no acute indication for renal replacement therapy (dialysis)  - Optimize hemodynamic status to avoid delay in renal recovery  - check BMP, magnesium, phosphorus in a m   - continue bladder scanning and place and urinary retention protocol and see if patient needs Chowdary  - Await renal recovery  - Place on a renal diet when allowed diet order    - Strict I/O  - patient follows with [facility] for his renal transplant  - for renal transplant continue on Imuran 50 mg p o  Q day, prednisone 5 mg p o  Q day, Prograf 2 mg p o  Q p m  And 3 mg p o  Q p a m   - check Prograf level in a m  As may need dose adjustments due to worsening renal parameters as that may also be contributing to his elevated creatinine    2)Blood pressure:  - Optimize hemodynamics   - Maintain MAP > 65mmHg  - Avoid BP fluctuations  - on hydralazine 25 mg p o  Q 8, Toprol-XL 50 mg p o  Q day, Procardia XL 60 mg p o   Q day    3)H/H/anemia:  - most recent hemoglobin at 7 3 grams/deciliter  - maintain hemoglobin greater than 8 grams/deciliter  - if patient needs transfusion recommend using leukocyte reduced, irradiated blood since he is a transplant patient  - hold off on IV iron due to patient being on antibiotics    4)Volume status:  - Clinically patient appears to be hypervolemic  - recommend placing on Lasix drip at 10 milligrams/hour for now and seeing diuretic response if no improvement or significant response then will likely place him on dialysis  - will see if he needs PermCath placed  5)Hyperphosphatemia:  - Likely due to decreased renal clearance  - Continue to monitor for now  - continue on sevelamer 800 mg p o  T i d  For now    6)CHF:  - Management as per primary team  - most recent BNP of 33281  - follow-up with cardiology  - most recent echo from 10/11/2019 showed EF of 52%  7)Diabetes:  - management per primary team  - on insulin    8)Pneumonia:  - management per primary team  - on cefepime and Flagyl    Thanks for the consult  Will continue to follow  Please call with questions/ concerns  Above-mentioned orders and Plan in terms of acute kidney injury and dialysis was discussed with the team in depth  Yaneth Conde MD, [facility], 10/20/2019, 1:33 PM          HISTORY OF PRESENT ILLNESS:   Suzanna Venegas is a 51 y o   male with pmh of hypertension, diabetes, CAD status post stents and CKD stage 5 T status post 2 renal transplants and multiple recent hospitalizations with volume overload and acute kidney injury who was admitted for increasing shortness of breath and volume overload initially at [facility], subsequently treated with Lasix and nitro drip and also noted to have pneumonia, subsequently transferred over to [facility] for higher care  Nephrology has been consulted for acute kidney injury management  Patient was seen and examined in the ICU earlier this a m  [facility] reports of acute shortness of breath that led to the hospitalization  Currently shortness of breath is stable  He had received 2 doses of Lasix thus far this a m  1 hours at bedside he had a bladder scan with 334 cc    Patient routinely follows up with junito Johnson for his transplant issues and in the past has been reluctant for dialysis  He wishes to pursue PD if the need arises  History obtained from chart review and the patient    Consults    Review of Systems   Constitutional: Positive for appetite change and fatigue  Negative for fever  HENT: Negative for congestion  Respiratory: Negative for cough, shortness of breath and wheezing  Cardiovascular: Negative for chest pain, palpitations and leg swelling  Gastrointestinal: Negative for abdominal pain, constipation, diarrhea, nausea and vomiting  Genitourinary: Negative for dysuria  Musculoskeletal: Negative for back pain  Neurological: Negative for dizziness, tremors and headaches  Psychiatric/Behavioral: Negative for confusion  All other systems reviewed and are negative         Historical Information   Patient Active Problem List   Diagnosis    Osteomyelitis ([facility] )    Foot pain    Type 1 diabetes mellitus ([place] [id number] )    Renal transplant, status post    Sepsis ([facility] )    [facility]    Osteomyelitis [facility]    Poor circulation    Closed fracture of distal end of right tibia with routine healing    Chronic kidney disease, stage IV (severe) ([facility] )    Chronic osteomyelitis of tibia ([facility] )    Immunosuppression ([facility] )    Hypertension    DKA (diabetic ketoacidoses) ([facility] )    Diarrhea    Cellulitis of ankle    Non-ST elevation myocardial infarction (NSTEMI), type 2    Urinary retention    Severe sepsis ([facility])    Stage 5 chronic kidney disease not on chronic dialysis ([facility])    Metabolic acidosis    Hyperphosphatemia    Gastrointestinal hemorrhage    Bacteremia    S/P AKA (above knee amputation), right ([facility])    Acute blood loss anemia    Acute respiratory failure ([facility])    Pulmonary hypertension ([facility])    Anemia    Depressed left ventricular ejection fraction    Acute CHF (congestive heart failure) ([facility])     Past Medical History:   Diagnosis Date    Bacteremia 12/21/2018    [facility]     Diabetes mellitus [facility]     Diabetes mellitus type 1 ([facility]     GI bleed     Hypertension     Infection at site of external fixator pin ([facility]     MI (myocardial infarction) ([facility]     Pneumonia     Last Assessed [id number]    Renal failure     Renal transplant, status post 07/21/2007     Past Surgical History:   Procedure Laterality Date    ANKLE FRACTURE SURGERY      ANKLE HARDWARE REMOVAL Right 7/31/2017    Procedure: REMOVAL HARDWARE ANKLE;  Surgeon: Cj Medina MD;  Location: [facility];  Service: Orthopedics    ANKLE HARDWARE REMOVAL Right 8/17/2017    Procedure: TIBIA FAILED HARDWARE REMOVAL;  Surgeon: Cj Medina MD;  Location: [facility];  Service: Orthopedics    CARDIAC SURGERY      2 stents    CLOSED REDUCTION ANKLE Right 7/3/2017    Procedure: CLOSED REDUCTION DISTAL TIB-FIB AND CASTING VS;  Surgeon: Cj Medina MD;  Location: [facility];  Service: Orthopedics    ESOPHAGOGASTRODUODENOSCOPY N/A 12/20/2018    Procedure: ESOPHAGOGASTRODUODENOSCOPY (EGD) in ICU; Surgeon: Roshan Silveira MD;  Location: [facility]; Service: Gastroenterology    EYE SURGERY      FRACTURE SURGERY      ORIF Rt Ankle    GLUTAMIC ACID DECARBOXYLASE (HISTORICAL)      IR PICC LINE  8/20/2018    IR VENOUS LINE REMOVAL  10/5/2018    LEG AMPUTATION Right 02/01/2019    Above the knee    NEPHRECTOMY TRANSPLANTED ORGAN      TX OPEN TREATMENT FRACTURE DISTAL TIBIA FIBULA Right 7/3/2017    Procedure: OPEN REDUCTION W/ INTERNAL FIXATION (ORIF);   Surgeon: Cj Medina MD;  Location: [facility];  Service: Orthopedics    TOE AMPUTATION Right 10/27/2016    Procedure: AMPUTATION TOE;  Surgeon: Cyril Crystal DPM;  Location: [facility];  Service:      Social History   Social History     Substance and Sexual Activity   Alcohol Use Not Currently     Social History     Substance and Sexual Activity   Drug Use Never     Social History     Tobacco Use   Smoking Status Never Smoker Smokeless Tobacco Never Used     Family History   Problem Relation Age of Onset    Diabetes Brother     Coronary artery disease Mother        Meds/Allergies   current meds:   Current Facility-Administered Medications   Medication Dose Route Frequency    aspirin chewable tablet 81 mg  81 mg Oral Daily    atorvastatin (LIPITOR) tablet 80 mg  80 mg Oral Daily With Dinner    azaTHIOprine (IMURAN) tablet 50 mg  50 mg Oral Daily    cefepime (MAXIPIME) 1,000 mg in dextrose 5 % 50 mL IVPB  1,000 mg Intravenous Q12H    chlorhexidine (PERIDEX) 0 12 % oral rinse 15 mL  15 mL Swish & Spit Q12H [address]    docusate sodium (COLACE) capsule 100 mg  100 mg Oral Daily    furosemide (LASIX) 500 mg infusion 50 mL  10 mg/hr Intravenous Continuous    heparin (porcine) subcutaneous injection 5,000 Units  5,000 Units Subcutaneous Q8H [address]    hydrALAZINE (APRESOLINE) tablet 25 mg  25 mg Oral Q8H [address]    insulin detemir (LEVEMIR) subcutaneous injection 7 Units  7 Units Subcutaneous BID    insulin lispro (HumaLOG) 100 units/mL subcutaneous injection 2-12 Units  2-12 Units Subcutaneous TID AC    Labetalol HCl (NORMODYNE) injection 10 mg  10 mg Intravenous Q6H PRN    metoprolol succinate (TOPROL-XL) 24 hr tablet 50 mg  50 mg Oral Daily    metroNIDAZOLE (FLAGYL) IVPB (premix) 500 mg  500 mg Intravenous Q8H    NIFEdipine (PROCARDIA XL) 24 hr tablet 60 mg  60 mg Oral Daily    nitroGLYcerin (TRIDIL) 50 mg in 250 mL infusion (premix)  5-200 mcg/min Intravenous Titrated    pantoprazole (PROTONIX) EC tablet 40 mg  40 mg Oral Early Morning    predniSONE tablet 5 mg  5 mg Oral Daily    senna (SENOKOT) tablet 8 6 mg  1 tablet Oral HS    sevelamer (RENAGEL) tablet 800 mg  800 mg Oral TID With Meals    tacrolimus (PROGRAF) capsule 2 mg  2 mg Oral HS    tacrolimus (PROGRAF) capsule 3 mg  3 mg Oral QAM    [START ON 10/21/2019] vancomycin (VANCOCIN) IVPB (premix) 750 mg  15 mg/kg Intravenous Daily PRN    and PTA meds:   Prior to Admission Medications   Prescriptions Last Dose Informant Patient Reported? Taking?    NIFEdipine (ADALAT CC) 30 MG 24 hr tablet   No No   Sig: Take 1 tablet (30 mg total) by mouth daily   Patient taking differently: Take 30 mg by mouth daily    albuterol (2 5 mg/3 mL) 0 083 % nebulizer solution   Yes No   Sig: Inhale 2 5 mg   aspirin 81 mg chewable tablet   Yes No   Sig: Chew 81 mg daily   atorvastatin (LIPITOR) 80 mg tablet   Yes No   Sig: Take 80 mg by mouth daily    azaTHIOprine (IMURAN) 50 mg tablet   Yes No   Sig: Take 50 mg by mouth   calcitriol (ROCALTROL) 0 5 MCG capsule  Self Yes No   Sig: Take 1 mcg by mouth daily Monday Wednesday, Friday   chlorhexidine (PERIDEX) 0 12 % solution   No No   Sig: Apply 15 mL to the mouth or throat every 12 (twelve) hours   Patient not taking: Reported on 10/20/2019   cholecalciferol 1000 units tablet   No No   Sig: Take 1 tablet (1,000 Units total) by mouth daily   glucagon (GLUCAGON EMERGENCY) 1 MG injection   No No   Sig: Inject 1 mg under the skin once as needed for low blood sugar for up to 1 dose   glucagon (GLUCAGON EMERGENCY) 1 MG injection   No No   Sig: Inject 1 mg under the skin once as needed for low blood sugar for up to 1 dose   insulin detemir (LEVEMIR) 100 units/mL subcutaneous injection  Self Yes No   Sig: Inject 7 Units under the skin 2 (two) times a day   insulin lispro (HumaLOG) 100 units/mL injection  Self Yes No   Sig: Inject 10 Units under the skin daily   insulin regular (HumuLIN R,NovoLIN R) infusion   No No   Sig: Infuse 0 1-30 Units/hr into a venous catheter continuous   metoprolol succinate (TOPROL-XL) 25 mg 24 hr tablet   No No   Sig: Take 2 tablets (50 mg total) by mouth daily   pantoprazole (PROTONIX) 40 mg tablet   No No   Sig: Take 1 tablet (40 mg total) by mouth daily in the early morning   predniSONE 5 mg tablet   Yes No   Sig: Take 5 mg by mouth daily   sevelamer (RENAGEL) 800 mg tablet   No No   Sig: Take 1 tablet (800 mg total) by mouth 3 (three) times a day with meals   tacrolimus (PROGRAF) 1 mg capsule   No No   Sig: 3 caps in the AM, 2 caps PM/HS   torsemide (DEMADEX) 20 mg tablet   No No   Sig: Take 1 tablet (20 mg total) by mouth daily      Facility-Administered Medications: None       Scheduled Meds:  Current Facility-Administered Medications:  aspirin 81 mg Oral Daily Heide Donahue MD    atorvastatin 80 mg Oral Daily With Sinai Wu MD    azaTHIOprine 50 mg Oral Daily Heide Donahue MD    cefepime 1,000 mg Intravenous Q12H Nan Mitchell PA-C    chlorhexidine 15 mL Swish & Spit Q12H [address], MD    docusate sodium 100 mg Oral Daily Heide Donahue MD    furosemide 10 mg/hr Intravenous Continuous Lizzie Francisco DO Last Rate: 10 mg/hr (10/20/19 0932)   heparin (porcine) 5,000 Units Subcutaneous [facility] Heide Donahue MD    hydrALAZINE 25 mg Oral [facility] Lizzie Francisco DO    insulin detemir 7 Units Subcutaneous BID Nan Mitchell PA-C    insulin lispro 2-12 Units Subcutaneous TID AC Rosalva Taylor PA-C    Labetalol HCl 10 mg Intravenous Q6H PRN Lizzie Francisco DO    metoprolol succinate 50 mg Oral Daily Heide Donahue MD    metroNIDAZOLE 500 mg Intravenous Q8H Heide Donahue MD Last Rate: Stopped (10/20/19 1022)   NIFEdipine ER 60 mg Oral Daily Heide Donahue MD    nitroGLYcerin 5-200 mcg/min Intravenous Titrated Heide Donahue MD Last Rate: Stopped (10/20/19 1330)   pantoprazole 40 mg Oral Early Morning Heide Donahue MD    predniSONE 5 mg Oral Daily Heide Donahue MD    senna 1 tablet Oral HS Heide Donahue MD    sevelamer 800 mg Oral TID With Meals Heide Donahue MD    tacrolimus 2 mg Oral HS Heide Donahue MD    tacrolimus 3 mg Oral QAM Heide Donahue MD    [START ON 10/21/2019] vancomycin 15 mg/kg Intravenous Daily PRN Nan Mitchell PA-C        PRN Meds: Labetalol HCl    [START ON 10/21/2019] vancomycin    Continuous Infusions:  furosemide 10 mg/hr Last Rate: 10 mg/hr (10/20/19 0952)   nitroGLYcerin 5-200 mcg/min Last Rate: Stopped (10/20/19 1330)       No Known Allergies      Invasive Devices: Invasive Devices     Peripheral Intravenous Line            Peripheral IV 10/19/19 Left Antecubital less than 1 day    Peripheral IV 10/19/19 Right Antecubital less than 1 day    Peripheral IV 10/20/19 Left Hand less than 1 day    Peripheral IV 10/20/19 Right Wrist less than 1 day                  PHYSICAL EXAM  BP (!) 102/47   Pulse 78   Temp 98 3 °F (36 8 °C) (Oral)   Resp 12   Ht 5' 4" (1 626 m)   Wt 54 3 kg (119 lb 11 4 oz)   SpO2 90%   BMI 20 55 kg/m²   Temp (24hrs), Av 4 °F (36 9 °C), Min:98 3 °F (36 8 °C), Max:98 5 °F (36 9 °C)        Intake/Output Summary (Last 24 hours) at 10/20/2019 1333  Last data filed at 10/20/2019 1200  Gross per 24 hour   Intake 916 48 ml   Output 875 ml   Net 41 48 ml       I/O last 24 hours: In: 916 5 [I V :616 5; IV Piggyback:300]  Out: 875 [Urine:875]          Current Weight: Weight - Scale: 54 3 kg (119 lb 11 4 oz)  First Weight: Weight - Scale: 54 3 kg (119 lb 11 4 oz)  Physical Exam   Constitutional: He is oriented to person, place, and time  He appears well-developed and well-nourished  No distress  HENT:   Head: Normocephalic and atraumatic  Mouth/Throat: Oropharynx is clear and moist  No oropharyngeal exudate  Eyes: Conjunctivae are normal  No scleral icterus  Neck: Neck supple  JVD present  Cardiovascular: Exam reveals friction rub  Pulmonary/Chest: Effort normal    Course breath sounds diffusely no wheezing   Abdominal: Soft  He exhibits no mass  There is no tenderness  Positive renal transplant right lower quadrant nontender   Musculoskeletal: He exhibits edema  Right AKA, +1 edema left lower extremity   Neurological: He is alert and oriented to person, place, and time  Skin: Skin is warm  He is not diaphoretic  No pallor  Psychiatric: He has a normal mood and affect  His behavior is normal    Nursing note and vitals reviewed          LABORATORY:    Results from last 7 days Lab Units 10/20/19  0419 10/20/19  0409 10/19/19  2300 10/18/19  0805 10/15/19  0528 10/14/19  0711   WBC Thousand/uL 10 90*  --  15 39* 11 94* 8 19 9 96   HEMOGLOBIN g/dL 7 3*  --  9 6* 8 4* 8 5* 6 7*   HEMATOCRIT % 22 8*  --  29 9* 26 8* 26 7* 21 0*   PLATELETS Thousands/uL 299  --  472* 318 320 307   POTASSIUM mmol/L  --  4 3 3 4* 4 6 4 2 4 1   CHLORIDE mmol/L  --  99* 111* 102 99* 101   CO2 mmol/L  --  31 18* 29 24 24   BUN mg/dL  --  123* 78* 116* 121* 126*   CREATININE mg/dL  --  5 99* 3 44* 5 62* 5 49* 5 39*   CALCIUM mg/dL  --  10 1 5 4* 9 0 8 2* 7 9*   MAGNESIUM mg/dL  --  2 3 2 3  --   --   --    PHOSPHORUS mg/dL  --  7 1*  --   --   --  6 3*      rest all reviewed    RADIOLOGY:  No orders to display     Rest all reviewed    Portions of the record may have been created with voice recognition software  Occasional wrong word or "sound a like" substitutions may have occurred due to the inherent limitations of voice recognition software  Read the chart carefully and recognize, using context, where substitutions have occurred  If you have any questions, please contact the dictating provider

## 2019-10-20 NOTE — ED PROVIDER NOTES
History  Chief Complaint   Patient presents with    Shortness of Breath     sudden onset of symptoms tonight, ems provided cpap, nitro tab, and nitro paste  This is a 50-year-old male with a history of chronic kidney disease status post renal transplant on tacrolimus and Prograf, diabetes, hypertension, CHF who presents with sudden onset shortness of breath  Per EMS, the patient started to experience sudden-onset shortness of breath  He was placed on non-rebreather with saturations to approximately 80%  He was then placed on CPAP with improvement of his symptoms  He was given sublingual nitro and nitro paste was placed on his chest with improvement of his symptoms  The patient states that he has been taking his medications as prescribed  I did have a discussion with the patient regarding goals, he is currently full code  Patient placed on BiPAP on arrival   He does have bilateral lower extremity pitting edema and rhonchorous lungs bilaterally  Denies fever/chills, nausea/vomiting, lightheadedness/dizziness, numbness/weakness, headache, change in vision, URI symptoms, neck pain, chest pain, palpitations, cough, back pain, flank pain, abdominal pain, diarrhea, hematochezia, melena, dysuria, hematuria  Prior to Admission Medications   Prescriptions Last Dose Informant Patient Reported? Taking?    NIFEdipine (ADALAT CC) 30 MG 24 hr tablet 10/19/2019 at Unknown time  No Yes   Sig: Take 1 tablet (30 mg total) by mouth daily   Patient taking differently: Take 30 mg by mouth daily    albuterol (2 5 mg/3 mL) 0 083 % nebulizer solution 10/19/2019 at Unknown time  Yes Yes   Sig: Inhale 2 5 mg   aspirin 81 mg chewable tablet 10/19/2019 at Unknown time  Yes Yes   Sig: Chew 81 mg daily   atorvastatin (LIPITOR) 80 mg tablet 10/19/2019 at Unknown time  Yes Yes   Sig: Take 80 mg by mouth daily    azaTHIOprine (IMURAN) 50 mg tablet 10/19/2019 at Unknown time  Yes Yes   Sig: Take 50 mg by mouth   calcitriol (ROCALTROL) 0 5 MCG capsule 10/19/2019 at Unknown time Self Yes Yes   Sig: Take 1 mcg by mouth daily Monday Wednesday, Friday   chlorhexidine (PERIDEX) 0 12 % solution Not Taking at Unknown time  No No   Sig: Apply 15 mL to the mouth or throat every 12 (twelve) hours   Patient not taking: Reported on 10/20/2019   cholecalciferol 1000 units tablet 10/19/2019 at Unknown time  No Yes   Sig: Take 1 tablet (1,000 Units total) by mouth daily   glucagon (GLUCAGON EMERGENCY) 1 MG injection Unknown at Unknown time  No No   Sig: Inject 1 mg under the skin once as needed for low blood sugar for up to 1 dose   glucagon (GLUCAGON EMERGENCY) 1 MG injection Unknown at Unknown time  No No   Sig: Inject 1 mg under the skin once as needed for low blood sugar for up to 1 dose   insulin detemir (LEVEMIR) 100 units/mL subcutaneous injection 10/19/2019 at Unknown time Self Yes Yes   Sig: Inject 7 Units under the skin 2 (two) times a day   insulin lispro (HumaLOG) 100 units/mL injection 10/19/2019 at Unknown time Self Yes Yes   Sig: Inject 10 Units under the skin daily   insulin regular (HumuLIN R,NovoLIN R) infusion 10/19/2019 at Unknown time  No Yes   Sig: Infuse 0 1-30 Units/hr into a venous catheter continuous   metoprolol succinate (TOPROL-XL) 25 mg 24 hr tablet 10/19/2019 at Unknown time  No Yes   Sig: Take 2 tablets (50 mg total) by mouth daily   pantoprazole (PROTONIX) 40 mg tablet 10/19/2019 at Unknown time  No Yes   Sig: Take 1 tablet (40 mg total) by mouth daily in the early morning   predniSONE 5 mg tablet 10/19/2019 at Unknown time  Yes Yes   Sig: Take 5 mg by mouth daily   sevelamer (RENAGEL) 800 mg tablet 10/19/2019 at Unknown time  No Yes   Sig: Take 1 tablet (800 mg total) by mouth 3 (three) times a day with meals   tacrolimus (PROGRAF) 1 mg capsule 10/19/2019 at Unknown time  No Yes   Sig: 3 caps in the AM, 2 caps PM/HS   torsemide (DEMADEX) 20 mg tablet 10/19/2019 at Unknown time  No Yes   Sig: Take 1 tablet (20 mg total) by mouth daily      Facility-Administered Medications: None       Past Medical History:   Diagnosis Date    Bacteremia 12/21/2018    [facility] [address] [facility]     Diabetes mellitus ([facility] )     Diabetes mellitus type 1 ([facility] )     GI bleed     Hypertension     [facility]     MI (myocardial infarction) ([facility] )     Pneumonia     Last Assessed [id number]    Renal failure     Renal transplant, status post 07/21/2007       Past Surgical History:   Procedure Laterality Date    ANKLE FRACTURE SURGERY      ANKLE HARDWARE REMOVAL Right 7/31/2017    Procedure: REMOVAL HARDWARE ANKLE;  Surgeon: Cj Medina MD;  [facility]: [facility];  Service: Orthopedics    ANKLE HARDWARE REMOVAL Right 8/17/2017    Procedure: TIBIA FAILED HARDWARE REMOVAL;  Surgeon: Cj Medina MD;  [facility]: [facility];  Service: Orthopedics    CARDIAC SURGERY      2 stents    CLOSED REDUCTION ANKLE Right 7/3/2017    Procedure: CLOSED REDUCTION DISTAL TIB-FIB AND CASTING VS;  Surgeon: Cj Medina MD;  [facility]: [facility];  Service: Orthopedics    ESOPHAGOGASTRODUODENOSCOPY N/A 12/20/2018    Procedure: ESOPHAGOGASTRODUODENOSCOPY (EGD) in ICU; Surgeon: Roshan Silveira MD;  Location: [facility]; Service: Gastroenterology    EYE SURGERY      FRACTURE SURGERY      ORIF Rt Ankle    GLUTAMIC ACID DECARBOXYLASE (HISTORICAL)      IR PICC LINE  8/20/2018    IR VENOUS LINE REMOVAL  10/5/2018    LEG AMPUTATION Right 02/01/2019    Above the knee    NEPHRECTOMY TRANSPLANTED ORGAN      MN OPEN TREATMENT FRACTURE DISTAL TIBIA FIBULA Right 7/3/2017    Procedure: OPEN REDUCTION W/ INTERNAL FIXATION (ORIF);   Surgeon: Cj Medina MD;  [facility]: [facility];  Service: Orthopedics    TOE AMPUTATION Right 10/27/2016    Procedure: AMPUTATION TOE;  Surgeon: Cyril Crystal DPM;  Location: [facility];  Service:        Family History   Problem Relation Age of Onset    Diabetes Brother     Coronary artery disease Mother      I have reviewed and agree with the history as documented  Social History     Tobacco Use    Smoking status: Never Smoker    Smokeless tobacco: Never Used   Substance Use Topics    Alcohol use: Not Currently    Drug use: Never        Review of Systems   Constitutional: Negative for chills, fatigue and fever  HENT: Negative for rhinorrhea, sore throat and trouble swallowing  Eyes: Negative for photophobia and visual disturbance  Respiratory: Positive for shortness of breath  Negative for cough and chest tightness  Cardiovascular: Negative for chest pain, palpitations and leg swelling  Gastrointestinal: Negative for abdominal pain, blood in stool, diarrhea, nausea and vomiting  Endocrine: Negative for polyuria  Genitourinary: Negative for dysuria, flank pain and hematuria  Musculoskeletal: Negative for back pain and neck pain  Skin: Negative for color change and rash  Allergic/Immunologic: Negative for immunocompromised state  Neurological: Negative for dizziness, weakness, light-headedness, numbness and headaches  All other systems reviewed and are negative  Physical Exam  Physical Exam   Constitutional: Vital signs are normal  He appears well-developed and well-nourished  He is cooperative  No distress  HENT:   Mouth/Throat: Uvula is midline and oropharynx is clear and moist    Eyes: Pupils are equal, round, and reactive to light  Conjunctivae, EOM and lids are normal    Neck: Trachea normal  No thyroid mass and no thyromegaly present  Cardiovascular: Regular rhythm, normal heart sounds, intact distal pulses and normal pulses  Tachycardia present  No murmur heard  Pulmonary/Chest: Accessory muscle usage present  Bradypnea noted  He is in respiratory distress  He has rhonchi  He has rales  Abdominal: Soft  Normal appearance and bowel sounds are normal  There is no tenderness  There is no rebound, no guarding, no CVA tenderness and negative Castanon's sign  Musculoskeletal:   2+ pitting edema bilateral lower extremities  Right-sided BKA  Neurological: He is alert  Skin: Skin is warm, dry and intact  Psychiatric: He has a normal mood and affect   His speech is normal and behavior is normal  Thought content normal        Vital Signs  ED Triage Vitals   Temp Pulse Respirations Blood Pressure SpO2   -- 10/19/19 2253 10/19/19 2253 10/19/19 2253 10/19/19 2253    (!) 106 (!) 24 (!) 217/102 93 %      Temp src Heart Rate Source Patient Position - Orthostatic VS BP Location FiO2 (%)   -- 10/19/19 2253 10/19/19 2253 10/19/19 2253 --    Monitor Lying Left arm       Pain Score       10/19/19 2330       No Pain           Vitals:    10/20/19 0041 10/20/19 0043 10/20/19 0045 10/20/19 0100   BP: 162/84 170/88 169/84 (!) 184/87   Pulse: 89 91 91 91   Patient Position - Orthostatic VS: Sitting Sitting  Sitting         Visual Acuity      ED Medications  Medications   nitroGLYcerin (TRIDIL) 50 mg in 250 mL infusion (premix) (160 mcg/min Intravenous Rate/Dose Change 10/20/19 0043)   nitroGLYcerin (TRIDIL) 50 mg in 250 mL infusion (premix) **ADS Override Pull** (125 mcg/min  Rate/Dose Change 10/19/19 2355)   nitroGLYcerin (TRIDIL) 50 mg in 250 mL infusion (premix) **ADS Override Pull** (has no administration in time range)   nitroGLYcerin (TRIDIL) 50 mg in 250 mL infusion (premix) **ADS Override Pull** (has no administration in time range)   vancomycin IVPB 750 mg (has no administration in time range)   metroNIDAZOLE (FLAGYL) IVPB (premix) 500 mg (500 mg Intravenous New Bag 10/20/19 0108)   nitroglycerin (FOR EMS ONLY) (NITRO-BID) 2 % TD ointment 1 inch (0 inches Does not apply Given to EMS 10/19/19 2257)   calcium gluconate 2 g in sodium chloride 0 9 % 100 mL IVPB (2 g Intravenous New Bag 10/20/19 0014)   cefepime (MAXIPIME) IVPB (premix) 2,000 mg (0 mg Intravenous Stopped 10/20/19 0106)   calcium gluconate 10 % injection **ADS Override Pull** (2 g  Given 10/20/19 0012) Diagnostic Studies  Results Reviewed     Procedure Component Value Units Date/Time    Procalcitonin [[id number]] Collected:  10/20/19 0115    Lab Status: In process Specimen:  Blood from Hand, Right Updated:  10/20/19 0122    Beta Hydroxybutyrate [[id number]]  (Normal) Collected:  10/20/19 0035    Lab Status:  Final result Specimen:  Blood from Arm, Right Updated:  10/20/19 0051     BETA-HYDROXYBUTYRATE 0 0 mmol/L     Blood gas, venous [[id number]]  (Abnormal) Collected:  10/20/19 0035    Lab Status:  Final result Specimen:  Blood from Arm, Right Updated:  10/20/19 0044     pH, Mio 7 570     pCO2, Mio 22 8 mm Hg      pO2, Mio 123 0 mm Hg      HCO3, Mio 20 4 mmol/L      Base Excess, Mio -1 0 mmol/L      O2 Content, Mio 10 8 ml/dL      O2 HGB, VENOUS 94 9 %     Blood culture #2 [[id number]] Collected:  10/20/19 0029    Lab Status: In process Specimen:  Blood from Hand, Right Updated:  10/20/19 0041    Blood culture #1 [[id number]] Collected:  10/20/19 0002    Lab Status: In process Specimen:  Blood from Arm, Right Updated:  10/20/19 0013    Magnesium [[id number]]  (Normal) Collected:  10/19/19 2300    Lab Status:  Final result Specimen:  Blood from Arm, Left Updated:  10/19/19 2334     Magnesium 2 3 mg/dL     NT-BNP PRO [[id number]]  (Abnormal) Collected:  10/19/19 2300    Lab Status:  Final result Specimen:  Blood from Arm, Left Updated:  10/19/19 2334     NT-proBNP 49,506 pg/mL     Lactic acid, plasma x2 [[id number]]  (Abnormal) Collected:  10/19/19 2300    Lab Status:  Final result Specimen:  Blood from Arm, Left Updated:  10/19/19 2334     LACTIC ACID 2 8 mmol/L     Narrative:       Result may be elevated if tourniquet was used during collection      Comprehensive metabolic panel [[id number]]  (Abnormal) Collected:  10/19/19 2300    Lab Status:  Final result Specimen:  Blood from Arm, Left Updated:  10/19/19 2334     Sodium 137 mmol/L      Potassium 3 4 mmol/L      Chloride 111 mmol/L      CO2 18 mmol/L      ANION GAP 8 mmol/L      BUN 78 mg/dL      Creatinine 3 44 mg/dL      Glucose 333 mg/dL      Calcium 5 4 mg/dL      AST 55 U/L      ALT 26 U/L      Alkaline Phosphatase 131 U/L      Total Protein 4 1 g/dL      Albumin 1 5 g/dL      Total Bilirubin 0 40 mg/dL      eGFR 20 ml/min/1 73sq m     Narrative:       National Kidney Disease Foundation guidelines for Chronic Kidney Disease (CKD):     Stage 1 with normal or high GFR (GFR > 90 mL/min/1 73 square meters)    Stage 2 Mild CKD (GFR = 60-89 mL/min/1 73 square meters)    Stage 3A Moderate CKD (GFR = 45-59 mL/min/1 73 square meters)    Stage 3B Moderate CKD (GFR = 30-44 mL/min/1 73 square meters)    Stage 4 Severe CKD (GFR = 15-29 mL/min/1 73 square meters)    Stage 5 End Stage CKD (GFR <15 mL/min/1 73 square meters)  Note: GFR calculation is accurate only with a steady state creatinine    Troponin I [[id number]]  (Abnormal) Collected:  10/19/19 2300    Lab Status:  Final result Specimen:  Blood from Arm, Left Updated:  10/19/19 2330     Troponin I 0 05 ng/mL     Protime-INR [[id number]]  (Normal) Collected:  10/19/19 2300    Lab Status:  Final result Specimen:  Blood from Arm, Left Updated:  10/19/19 2323     Protime 12 5 seconds      INR 0 93    APTT [[id number]]  (Normal) Collected:  10/19/19 2300    Lab Status:  Final result Specimen:  Blood from Arm, Left Updated:  10/19/19 2323     PTT 31 seconds     CBC and differential [[id number]]  (Abnormal) Collected:  10/19/19 2300    Lab Status:  Final result Specimen:  Blood from Arm, Left Updated:  10/19/19 2310     WBC 15 39 Thousand/uL      RBC 3 11 Million/uL      Hemoglobin 9 6 g/dL      Hematocrit 29 9 %      MCV 96 fL      MCH 30 9 pg      MCHC 32 1 g/dL      RDW 15 6 %      MPV 8 6 fL      Platelets 961 Thousands/uL      nRBC 0 /100 WBCs      Neutrophils Relative 69 %      Immat GRANS % 0 %      Lymphocytes Relative 21 %      Monocytes Relative 8 %      Eosinophils Relative 1 %      Basophils Relative 1 % Neutrophils Absolute 10 56 Thousands/µL      Immature Grans Absolute 0 06 Thousand/uL      Lymphocytes Absolute 3 25 Thousands/µL      Monocytes Absolute 1 21 Thousand/µL      Eosinophils Absolute 0 22 Thousand/µL      Basophils Absolute 0 09 Thousands/µL     Lactic acid, plasma x2 [[id number]]     Lab Status:  No result Specimen:  Blood                  XR chest 1 view portable   ED Interpretation by Caridad Sprague MD (10/19 9175)   Opacities overlying the left and right lung concern for possible pneumonia  Vascular congestion as interpreted by myself                   Procedures  ECG 12 Lead Documentation Only  Date/Time: 10/19/2019 11:13 PM  Performed by: Caridad Sprague MD  Authorized by: Caridad Sprague MD     ECG reviewed by me, the ED Provider: yes    Patient location:  ED  Previous ECG:     Previous ECG:  Compared to current    Comparison ECG info:  10/14/19    Similarity:  No change    Comparison to cardiac monitor: Yes    Interpretation:     Interpretation: abnormal    Rate:     ECG rate:  103    ECG rate assessment: tachycardic    Rhythm:     Rhythm: sinus tachycardia    Ectopy:     Ectopy: none    QRS:     QRS axis:  Normal    QRS intervals:  Normal  Conduction:     Conduction: normal    ST segments:     ST segments:  Normal  T waves:     T waves: normal      CriticalCare Time  Performed by: Caridad Sprague MD  Authorized by: Caridad Sprague MD     Critical care provider statement:     Critical care time (minutes):  84    Critical care start time:  10/19/2019 10:50 PM    Critical care end time:  10/20/2019 12:14 AM    Critical care time was exclusive of:  Separately billable procedures and treating other patients    Critical care was necessary to treat or prevent imminent or life-threatening deterioration of the following conditions:  Respiratory failure, cardiac failure and sepsis    Critical care was time spent personally by me on the following activities:  Obtaining history from patient or surrogate, discussions with primary provider, evaluation of patient's response to treatment, examination of patient, review of old charts, re-evaluation of patient's condition, ordering and review of radiographic studies, ordering and review of laboratory studies and development of treatment plan with patient or surrogate           ED Course  ED Course as of Oct 20 0125   Sat Oct 19, 2019   2337 Will give calcium   Calcium(!!): 5 4   2358 stable   Troponin I(!): 0 05   Sun Oct 20, 2019   0002 The patient appears much more comfortable at this time  States that he feels much better on the nitro drip  Given x-ray findings, will empirically treat for possible pneumonia  Lab still pending  The patient will ultimately need to be transferred  Initial Sepsis Screening     Row Name 10/19/19 2356                Is the patient's history suggestive of a new or worsening infection? (!) Yes (Proceed)  -CC        Suspected source of infection  pneumonia  -CC        Are two or more of the following signs & symptoms of infection both present and new to the patient? (!) Yes (Proceed)  -CC        Indicate SIRS criteria  Tachycardia > 90 bpm;Tachypnea > 20 resp per min;Leukocytosis (WBC > 43985 IJL)  -CC        If the answer is yes to both questions, suspicion of sepsis is present          If severe sepsis is present AND tissue hypoperfusion perists in the hour after fluid resuscitation or lactate > 4, the patient meets criteria for SEPTIC SHOCK          Are any of the following organ dysfunction criteria present within 6 hours of suspected infection and SIRS criteria that are NOT considered to be chronic conditions?   (!) Yes  -CC        Organ dysfunction  Lactate > 2 0 mmol/L  -CC        Date of presentation of severe sepsis  10/19/19  -CC        Time of presentation of severe sepsis  2356  -CC        Tissue hypoperfusion persists in the hour after crystalloid fluid administration, evidenced, by either:  Woody Nguyen Was hypotension present within one hour of the conclusion of crystalloid fluid administration? No  -CC        Date of presentation of septic shock          Time of presentation of septic shock            User Key  (r) = Recorded By, (t) = Taken By, (c) = Cosigned By    Initials Name Provider Type    GRETEL Posada MD Physician           Default Flowsheet Data (last 720 hours)      Sepsis Reassess     Row Name 10/20/19 0045                   Repeat Volume Status and Tissue Perfusion Assessment Performed    Repeat Volume Status and Tissue Perfusion Assessment Performed  Yes  -CC           Volume Status and Tissue Perfusion Post Fluid Resuscitation * Must Document All *    Vital Signs Reviewed (HR, RR, BP, T)  Yes  -CC        Shock Index Reviewed  Yes  -CC        Arterial Oxygen Saturation Reviewed (POx, SaO2 or SpO2)  Yes (comment %) 100  -CC        Cardio  Normal S1/S2; Regular rate and rhythm; No murmor  -CC        Pulmonary  (!) Normal effort;Rales; Tachypnea  -CC        Capillary Refill  Brisk  -CC        Peripheral Pulses  Radial  -CC        Peripheral Pulse  +2  -CC        Skin  Warm;Dry;Normal  -CC        Urine output assessed  None  -CC           *OR*   Intensive Monitoring- Must Document One of the Following Four *:    Vital Signs Reviewed          * Central Venous Pressure (CVP or RAP)          * Central Venous Oxygen (SVO2, ScvO2 or Oxygen saturation via central catheter)          * Bedside Cardiovascular US in IVC diameter and % collapse          * Passive Leg Raise OR Crystalloid Challenge            User Key  (r) = Recorded By, (t) = Taken By, (c) = Cosigned By    Initials Name Provider Type    GRETEL Posada MD Physician                MDM  Number of Diagnoses or Management Options  Diagnosis management comments: Likely acute onset heart failure  The patient is hypertensive  Plan to start nitro drip  Portable x-ray  Will check labs and EKG    Patient will likely need to be transferred  Disposition  Final diagnoses:   Acute respiratory failure with hypercapnia (HCC)   Hypocalcemia   Pulmonary edema   CHF (congestive heart failure) (HCC)   Pneumonia   Lactic acidosis     Time reflects when diagnosis was documented in both MDM as applicable and the Disposition within this note     Time User Action Codes Description Comment    10/20/2019 12:18 AM Lonney Salinas P Add [J96 02] Acute respiratory failure with hypercapnia ([address] )     10/20/2019 12:30 AM Lonney Rosario P Add [E83 51] Hypocalcemia     10/20/2019 12:30 AM Lonney Salinas P Add [J81 1] Pulmonary edema     10/20/2019 12:30 AM Lonney Salinas P Add [I50 9] CHF (congestive heart failure) ([facility] [address] )     10/20/2019 12:30 AM Lonney Salinas P Add [J18 9] Pneumonia     10/20/2019 12:30 AM Lonney Rosario P Add [E87 2] Lactic acidosis       ED Disposition     ED Disposition Condition Date/Time Comment    Transfer to Another Facility-In Network  [place] Oct 20, 2019 12:36 AM Alessandro Simon should be transferred out to [facility]          MD Documentation      Most Recent Value   Patient Condition  The patient has been stabilized such that within reasonable medical probability, no material deterioration of the patient condition or the condition of the unborn child(tiesha) is likely to result from the transfer   Reason for Transfer  Level of Care needed not available at this facility   Benefits of Transfer  Specialized equipment and/or services available at the receiving facility (Include comment)________________________   Risks of Transfer  Potential for delay in receiving treatment, Potential deterioration of medical condition, Loss of IV, Possible worsening of condition or death during transfer, Increased discomfort during transfer   Accepting Physician  Dr Tal Kim Name, Darinel Goodson MD  [facility]   Provider Certification  General risk, such as traffic hazards, adverse weather conditions, rough terrain or turbulence, possible failure of equipment (including vehicle or aircraft), or consequences of actions of persons outside the control of the transport personnel, Unanticipated needs of medical equipment and personnel during transport, Risk of worsening condition, The possibility of a transport vehicle being unavailable      RN Documentation      [facility] Name, [facility]    SLB      Follow-up Information    None         Patient's Medications   Discharge Prescriptions    No medications on file     No discharge procedures on file      ED Provider  Electronically Signed by           Arianna Adhikari MD  10/20/19 7169

## 2019-10-20 NOTE — PROGRESS NOTES
Vancomycin IV Pharmacy-to-Dose Consultation    Suzanna Venegas is a 51 y o  male who is currently receiving vancomycin IV with management by the Pharmacy Consult service  Relevant clinical data and objective history reviewed:  Temp Readings from Last 3 Encounters:   10/20/19 98 5 °F (36 9 °C) (Rectal)   10/15/19 98 3 °F (36 8 °C) (Temporal)   10/11/19 (!) 96 7 °F (35 9 °C) (Temporal)     /75   Pulse 78   Temp 98 5 °F (36 9 °C) (Rectal)   Resp 13   Ht 5' 4" (1 626 m)   Wt 54 3 kg (119 lb 11 4 oz)   SpO2 99%   BMI 20 55 kg/m²     I/O last 3 completed shifts:   In: 313 4 [I V :213 4; IV Piggyback:100]  Out: 350 [Urine:350]    Lab Results   Component Value Date/Time     (H) 10/20/2019 04:09 AM    BUN 31 (H) 11/06/2015 07:34 AM    WBC 10 90 (H) 10/20/2019 04:19 AM    WBC 6 52 11/06/2015 07:34 AM    HGB 7 3 (L) 10/20/2019 04:19 AM    HGB 11 7 (L) 11/06/2015 07:34 AM    HCT 22 8 (L) 10/20/2019 04:19 AM    HCT 35 4 (L) 11/06/2015 07:34 AM    MCV 95 10/20/2019 04:19 AM    MCV 89 11/06/2015 07:34 AM     10/20/2019 04:19 AM     11/06/2015 07:34 AM     Creatinine   Date Value Ref Range Status   10/20/2019 5 99 (H) 0 60 - 1 30 mg/dL Final     Comment:     Standardized to IDMS reference method   10/19/2019 3 44 (H) 0 60 - 1 30 mg/dL Final     Comment:     Standardized to IDMS reference method   11/06/2015 1 26 0 60 - 1 30 mg/dL Final     Comment:     Standardized to IDMS reference method   06/05/2015 1 07 0 60 - 1 30 mg/dL Final     Comment:     Standardized to IDMS reference method     VANCOMYCIN TROUGH   Date Value Ref Range Status   12/09/2014 19 6 10 0 - 20 0 mcg/mL Final     Comment:     The above 1 analytes were performed [place] [address] [place] [address]       Vancomycin Tr   Date Value Ref Range Status   12/22/2018 26 4 (HH) 10 0 - 20 0 ug/mL Final   09/24/2018 13 5 10 0 - 20 0 ug/mL Final     Vancomycin Rm   Date Value Ref Range Status   12/21/2018 14 3 ug/mL Final 12/20/2018 11 2 ug/mL Final         Vancomycin Assessment:  Indication: Pneumonia  Status: critically ill  Micro:   10/20 blood cx: in process  Procalcitonin: mildly elevated today at 0 33, but patient does have CKD  Renal Function: serum creatinine up from 3 44 last night to 5 99 today  Potential Nephrotoxicity Factors:  Medications: tacrolimus  Patient-Factors: renal dysfunction  Days of Therapy: 1  Current Dose: 500 mg IV q24h (x1 dose this AM )  Goal Trough: 15-20 (appropriate for most indications)   Goal AUC(24h): 400-600  Last Level: n/a      Vancomycin Plan:  New Dosing: due to CKD and renal txp change to 500 mg + 500 mg IV x1 then 750 mg IV q24h PRN when random vancomycin level < 20 (next dose: now)  Next Level: random level 10/21 with AM labs  Renal Function Monitoring: daily BMP and UOP assessment      Pharmacy will continue to follow closely for s/sx of nephrotoxicity, infusion reactions and appropriateness of therapy  BMP and CBC will be ordered per protocol  We will continue to follow the patient's culture results and clinical progress daily         Thank you,  Mer Shelley, PharmD, [facility] 6 Pharmacist  [phone]

## 2019-10-20 NOTE — NURSING NOTE
Report called to Dontrell Ventura on p5  Patient transported by nurse via wheelchair to floor with o2, monitor and IV pump  Significant other Sylvie called and updated on transfer

## 2019-10-20 NOTE — ASSESSMENT & PLAN NOTE
Wt Readings from Last 3 Encounters:   10/20/19 54 3 kg (119 lb 11 4 oz)   10/19/19 56 2 kg (123 lb 14 4 oz)   10/20/19 56 kg (123 lb 7 3 oz)     Continue with diuresis   Wean NC as able

## 2019-10-20 NOTE — PLAN OF CARE
Problem: Potential for Falls  Goal: Patient will remain free of falls  Description  INTERVENTIONS:  - Assess patient frequently for physical needs  -  Identify cognitive and physical deficits and behaviors that affect risk of falls    -  [place] fall precautions as indicated by assessment   - Educate patient/family on patient safety including physical limitations  - Instruct patient to call for assistance with activity based on assessment  - Modify environment to reduce risk of injury  - Consider OT/PT consult to assist with strengthening/mobility  Outcome: Progressing     Problem: Prexisting or High Potential for Compromised Skin Integrity  Goal: Skin integrity is maintained or improved  Description  INTERVENTIONS:  - Identify patients at risk for skin breakdown  - Assess and monitor skin integrity  - Assess and monitor nutrition and hydration status  - Monitor labs   - Assess for incontinence   - Turn and reposition patient  - Assist with mobility/ambulation  - Relieve pressure over bony prominences  - Avoid friction and shearing  - Provide appropriate hygiene as needed including keeping skin clean and dry  - Evaluate need for skin moisturizer/barrier cream  - Collaborate with interdisciplinary team   - Patient/family teaching  - Consider wound care consult   Outcome: Progressing     Problem: PAIN - ADULT  Goal: Verbalizes/displays adequate comfort level or baseline comfort level  Description  Interventions:  - Encourage patient to monitor pain and request assistance  - Assess pain using appropriate pain scale  - Administer analgesics based on type and severity of pain and evaluate response  - Implement non-pharmacological measures as appropriate and evaluate response  - Consider cultural and social influences on pain and pain management  - Notify physician/advanced practitioner if interventions unsuccessful or patient reports new pain  Outcome: Progressing     Problem: INFECTION - ADULT  Goal: Absence or prevention of progression during hospitalization  Description  INTERVENTIONS:  - Assess and monitor for signs and symptoms of infection  - Monitor lab/diagnostic results  - Monitor all insertion sites, i e  indwelling lines, tubes, and drains  - Monitor endotracheal if appropriate and nasal secretions for changes in amount and color  - [place] appropriate cooling/warming therapies per order  - Administer medications as ordered  - Instruct and encourage patient and family to use good hand hygiene technique  - Identify and instruct in appropriate isolation precautions for identified infection/condition  Outcome: Progressing     Problem: SAFETY ADULT  Goal: Maintain or return to baseline ADL function  Description  INTERVENTIONS:  -  Assess patient's ability to carry out ADLs; assess patient's baseline for ADL function and identify physical deficits which impact ability to perform ADLs (bathing, care of mouth/teeth, toileting, grooming, dressing, etc )  - Assess/evaluate cause of self-care deficits   - Assess range of motion  - Assess patient's mobility; develop plan if impaired  - Assess patient's need for assistive devices and provide as appropriate  - Encourage maximum independence but intervene and supervise when necessary  - Involve family in performance of ADLs  - Assess for home care needs following discharge   - Consider OT consult to assist with ADL evaluation and planning for discharge  - Provide patient education as appropriate  Outcome: Progressing  Goal: Maintain or return mobility status to optimal level  Description  INTERVENTIONS:  - Assess patient's baseline mobility status (ambulation, transfers, stairs, etc )    - Identify cognitive and physical deficits and behaviors that affect mobility  - Identify mobility aids required to assist with transfers and/or ambulation (gait belt, sit-to-stand, lift, walker, cane, etc )  - [place] fall precautions as indicated by assessment  - Record patient progress and toleration of activity level on Mobility SBAR; progress patient to next Phase/Stage  - Instruct patient to call for assistance with activity based on assessment  - Consider rehabilitation consult to assist with strengthening/weightbearing, etc   Outcome: Progressing     Problem: DISCHARGE PLANNING  Goal: Discharge to home or other facility with appropriate resources  Description  INTERVENTIONS:  - Identify barriers to discharge w/patient and caregiver  - Arrange for needed discharge resources and transportation as appropriate  - Identify discharge learning needs (meds, wound care, etc )  - Arrange for interpretive services to assist at discharge as needed  - Refer to Case Management Department for coordinating discharge planning if the patient needs post-hospital services based on physician/advanced practitioner order or complex needs related to functional status, cognitive ability, or social support system  Outcome: Progressing     Problem: Knowledge Deficit  Goal: Patient/family/caregiver demonstrates understanding of disease process, treatment plan, medications, and discharge instructions  Description  Complete learning assessment and assess knowledge base    Interventions:  - Provide teaching at level of understanding  - Provide teaching via preferred learning methods  Outcome: Progressing     Problem: CARDIOVASCULAR - ADULT  Goal: Maintains optimal cardiac output and hemodynamic stability  Description  INTERVENTIONS:  - Monitor I/O, vital signs and rhythm  - Monitor for S/S and trends of decreased cardiac output  - Administer and titrate ordered vasoactive medications to optimize hemodynamic stability  - Assess quality of pulses, skin color and temperature  - Assess for signs of decreased coronary artery perfusion  - Instruct patient to report change in severity of symptoms  Outcome: Progressing  Goal: Absence of cardiac dysrhythmias or at baseline rhythm  Description  INTERVENTIONS:  - Continuous cardiac monitoring, vital signs, obtain 12 lead EKG if ordered  - Administer antiarrhythmic and heart rate control medications as ordered  - Monitor electrolytes and administer replacement therapy as ordered  Outcome: Progressing     Problem: RESPIRATORY - ADULT  Goal: Achieves optimal ventilation and oxygenation  Description  INTERVENTIONS:  - Assess for changes in respiratory status  - Assess for changes in mentation and behavior  - Position to facilitate oxygenation and minimize respiratory effort  - Oxygen administered by appropriate delivery if ordered  - Initiate smoking cessation education as indicated  - Encourage broncho-pulmonary hygiene including cough, deep breathe, Incentive Spirometry  - Assess the need for suctioning and aspirate as needed  - Assess and instruct to report SOB or any respiratory difficulty  - Respiratory Therapy support as indicated  Outcome: Progressing     Problem: METABOLIC, FLUID AND ELECTROLYTES - ADULT  Goal: Electrolytes maintained within normal limits  Description  INTERVENTIONS:  - Monitor labs and assess patient for signs and symptoms of electrolyte imbalances  - Administer electrolyte replacement as ordered  - Monitor response to electrolyte replacements, including repeat lab results as appropriate  - Instruct patient on fluid and nutrition as appropriate  Outcome: Progressing  Goal: Fluid balance maintained  Description  INTERVENTIONS:  - Monitor labs   - Monitor I/O and WT  - Instruct patient on fluid and nutrition as appropriate  - Assess for signs & symptoms of volume excess or deficit  Outcome: Progressing  Goal: Glucose maintained within target range  Description  INTERVENTIONS:  - Monitor Blood Glucose as ordered  - Assess for signs and symptoms of hyperglycemia and hypoglycemia  - Administer ordered medications to maintain glucose within target range  - Assess nutritional intake and initiate nutrition service referral as needed  Outcome: Progressing

## 2019-10-20 NOTE — PROGRESS NOTES
I have personally seen and examined the patient  I assumed care from Dr Tanika Brock for this patient this morning  Labs and radiographs were personally reviewed  Labs:   ABG: No results found for: PHART, FKA9RTQ, PO2ART, JLB9UMU, D7IUBYBQ, BEART, SOURCE, BNP: No results found for: BNP, CBC:   Lab Results   Component Value Date    WBC 10 90 (H) 10/20/2019    HGB 7 3 (L) 10/20/2019    HCT 22 8 (L) 10/20/2019    MCV 95 10/20/2019     10/20/2019    MCH 30 3 10/20/2019    MCHC 32 0 10/20/2019    RDW 15 4 (H) 10/20/2019    MPV 8 6 (L) 10/20/2019    NRBC 0 10/20/2019   , CMP:   Lab Results   Component Value Date    SODIUM 139 10/20/2019    K 4 3 10/20/2019    CL 99 (L) 10/20/2019    CO2 31 10/20/2019     (H) 10/20/2019    CREATININE 5 99 (H) 10/20/2019    CALCIUM 10 1 10/20/2019    AST 38 10/20/2019    ALT 42 10/20/2019    ALKPHOS 157 (H) 10/20/2019    EGFR 10 10/20/2019   , PT/INR:   Lab Results   Component Value Date    INR 1 09 10/20/2019   , Troponin:   Lab Results   Component Value Date    TROPONINI 0 11 (H) 10/20/2019       Physical exam   /75   Pulse 78   Temp 98 5 °F (36 9 °C) (Rectal)   Resp 13   Ht 5' 4" (1 626 m)   Wt 54 3 kg (119 lb 11 4 oz)   SpO2 99%   BMI 20 55 kg/m²      Gen: a&O, nad   Heart: rrr w/o mrg  Lungs: dimished at bases, no wheezing   Ext: no c/c/e     Assessment/Plan:  50 y/o male with history of renal transplant (2001), CHF, DM who developed sob at home and was brought to ED by EMS and was placed on ntg and received lasix gtt  Has greatly improve  On NC     1  Flash pulmonary edema: likely to due to hypertensive emergency   2  Acute hypoxic respiratory failure  3  Severe pulmonary hypertension   4  Decompensated HF  5  VSD   6  MIKE on chronic kidney injury   7  Left extremity edema     Plan   1  Lasix infusion   2  ntg gtt   3  HF consult   4  Cont nifedipine   5  Start hydralazine   6  Prn lV labelol   7   Pulm toilet, cont abx, f/u procalciton and dc descalate  as permitted  8  Cardiac, renal diet  9  Recheck hgb this evening    Updated the patient and his wife regarding his plan

## 2019-10-20 NOTE — PROGRESS NOTES
Progress Note - Trenton Vergara 1970, 51 y o  male MRN: [id number]    Unit/Bed#: MICU 08 Encounter: [id number]    Primary Care Provider: Marcial Olsen DO   Date and time admitted to hospital: 10/20/2019  2:21 AM        Renal transplant, status post  Assessment & Plan  Nephrology following    Acute CHF (congestive heart failure) ([facility] )  Assessment & Plan  Wt Readings from Last 3 Encounters:   10/20/19 54 3 kg (119 lb 11 4 oz)   10/19/19 56 2 kg (123 lb 14 4 oz)   10/20/19 56 kg (123 lb 7 3 oz)     Continue with diuresis   Wean NC as able        Depressed left ventricular ejection fraction  Assessment & Plan  Heart failure consulted    Stage 5 chronic kidney disease not on chronic dialysis [facility])  Assessment & Plan  Nephrology following  Lasix gtt   Continued on home renal transplant medications    Non-ST elevation myocardial infarction (NSTEMI), type 2  Assessment & Plan  Trended troponins     Hypertension  Assessment & Plan  Restarted home lopressor and nifedipine   Continues on nitroglycerine gtt  Initiated on hydralazine with goal to discontinue drip   Heart failure consulted as patient has severe pulmonary artery hypertension with CHF and has had 2 exacerbations in past 2 weeks      Type 1 diabetes mellitus (HCC)  Assessment & Plan  Discontinued insulin gtt and started on SSI  Endocrine consulted    * Acute respiratory failure ([facility] )  Assessment & Plan  Has not required BiPAP since admission  Wean NC as able          Code Status: Level 1 - Full Code  POA:    POLST:      Reason for ICU admission:   Acute respiratory failure    Active problems:   Principal Problem:    Acute respiratory failure ([facility] )  Active Problems:    Renal transplant, status post    Type 1 diabetes mellitus (HCC)    Hypertension    Non-ST elevation myocardial infarction (NSTEMI), type 2    Stage 5 chronic kidney disease not on chronic dialysis ([facility] )    Depressed left ventricular ejection fraction    Acute CHF (congestive heart failure) [facility])  Resolved Problems:    * No resolved hospital problems  *      Consultants:   Nephrology  Endocrine  Heart failure    History of Present Illness and Summary of clinical course:   Presented to [facility] with acute CHF exacerbation, required CPAP prehospital and transitioned to BiPAP in ED  Given nitroglycerine and placed on gtt with improvement of breathing and patient weaned to [place]  Additionally given ABX as CXR with opacifications  Patient had concern for decompensation and was transferred to [facility]  Upon arrival patient given lasix 80mg with little UOP, nephrology consulted as patient has history of renal transplant now with MIKE  Nephrology recommended lasix drip with plans of intermittent hemodialysis tomorrow if patient does not improve  Patient continued on ABX with plan to discontinue on 10/20 if procal, blood cultures and fever are negative  Consulted heart failure service to assist with severe pulmonary artery hypertension management as well as multiple episodes of acute chf of past few months  Endocrine consulted for glucose management in setting of renal failure on levemir       Recent or scheduled procedures:   None    Outstanding/pending diagnostics:   None    Cultures:   Blood        Mobilization Plan:   PT/OT    Nutrition Plan:   Cardiac/renal    VTE Pharmacologic Prophylaxis: Heparin  VTE Mechanical Prophylaxis: sequential compression device    Discharge Plan:   Patient should be ready for discharge to home  after weaning from oxygen and completed consults    Initial Physical Therapy Recommendations: N/A  Initial Occupational Therapy Recommendations: N/A  Initial /Plan: N/A    Home medications that are not reordered and reason why:   Levemir- ARF, endocrine consulted  Demadex- on lasix gtt       Spoke with Dr Aubrey Harris  regarding transfer  Please call [phone] with any questions or concerns  Portions of the record may have been created with voice recognition software  Occasional wrong word or "sound a like" substitutions may have occurred due to the inherent limitations of voice recognition software  Read the chart carefully and recognize, using context, where substitutions have occurred

## 2019-10-20 NOTE — ASSESSMENT & PLAN NOTE
Restarted home lopressor and nifedipine   Continues on nitroglycerine gtt  Initiated on hydralazine with goal to discontinue drip   Heart failure consulted as patient has severe pulmonary artery hypertension with CHF and has had 2 exacerbations in past 2 weeks

## 2019-10-20 NOTE — SOCIAL WORK
Patient sleeping,  family not present in room  CM called patient's spouse Jenni Vasquez for open assessment information  Jenni Vasquez states that she is a nurse and is able to assist patient very thoroughly at home  Hisotry of [employer]  Jenni Vasquez states patient does very well, and is able to most things independently  He is independent with transfers to the commode and shower chair  He is primarily wheel chair bound in the home  [place] and is able to wheel self into ground floor  Patient goes up approximately 15 steps on his bottom and once to the next floor has a wheelchair at the top of the stairs  Jenni Vasquez does assist with transfers from wheelchair to steps  Patient has had [facility] in the past but not recently  No hx of SNF but has used [facility] in [place]  Patients spouse can be reached via cell [phone] or at work M-F [phone]  CM will continue to follow for all discharge planning needs

## 2019-10-20 NOTE — RESPIRATORY THERAPY NOTE
RT Protocol Note  Nilo Harden 51 y o  male MRN: [id number]  Unit/Bed#: [facility] 529-01 Encounter: [id number]    Assessment    Principal Problem:    Acute respiratory failure ([facility] )  Active Problems:    Type 1 diabetes mellitus ([place] [address] )    Renal transplant, status post    Hypertension    Non-ST elevation myocardial infarction (NSTEMI), type 2    Stage 5 chronic kidney disease not on chronic dialysis ([place] [address] )    Depressed left ventricular ejection fraction    Acute CHF (congestive heart failure) ([place] [address] )      Home Pulmonary Medications:  Albuterol udn prn       Past Medical History:   Diagnosis Date    Bacteremia 12/21/2018    Cardiac arrest ([place] [address] )     Diabetes mellitus ([place] [address] )     Diabetes mellitus type 1 ([place] [address] )     GI bleed     Hypertension     Infection at site of external fixator pin ([place] [address] )     MI (myocardial infarction) ([place] [address] )     Pneumonia     Last Assessed [id number]    Renal failure     Renal transplant, status post 07/21/2007     Social History     Socioeconomic History    Marital status: /Civil Union     Spouse name: Not on file    Number of children: Not on file    Years of education: Not on file    Highest education level: Not on file   Occupational History    Not on file   Social Needs    Financial resource strain: Not on file    Food insecurity:     Worry: Not on file     Inability: Not on file    Transportation needs:     Medical: Not on file     Non-medical: Not on file   Tobacco Use    Smoking status: Never Smoker    Smokeless tobacco: Never Used   Substance and Sexual Activity    Alcohol use: Not Currently    Drug use: Never    Sexual activity: Yes     Partners: Female   Lifestyle    Physical activity:     Days per week: Not on file     Minutes per session: Not on file    Stress: Not on file   Relationships    Social connections:     Talks on phone: Not on file     Gets together: Not on file     Attends Church service: Not on file     Active member of club or organization: Not on file     Attends meetings of clubs or organizations: Not on file     Relationship status: Not on file    Intimate partner violence:     Fear of current or ex partner: Not on file     Emotionally abused: Not on file     Physically abused: Not on file     Forced sexual activity: Not on file   Other Topics Concern    Not on file   Social History Narrative    No living will       Subjective         Objective    Physical Exam:   Assessment Type: Assess only  General Appearance: Sleeping  Respiratory Pattern: Normal  Chest Assessment: Chest expansion symmetrical    Vitals:  Blood pressure 157/75, pulse 82, temperature 98 °F (36 7 °C), temperature source Oral, resp  rate 12, height 5' 4" (1 626 m), weight 54 3 kg (119 lb 11 4 oz), SpO2 99 %  Imaging and other studies: I have personally reviewed pertinent reports  O2 Device: BiPAP     Plan    Respiratory Plan: No distress/Pulmonary history        Resp Comments: Pt evaluated per respiratory protocol  Pt not in any respiratory distress at this time  Pt was placed on bipap upon arrival to hospital  Pt has since been taken off and bipap orders were discontiued  Pt is not in any respiratory distress at this time  Pt does used udns at home as needed  Will order pt on albuterol udn prn and continue to moniter pt

## 2019-10-20 NOTE — CONSULTS
Consultation - [facility]    Patient Information: Isadora Gómez 51 y o  male MRN: [id number]  [facility]counter: [id number]  Admitting Physician: Julia Smart MD  PCP: Smiley Stallworth DO  Date of Admission:  10/20/19    ASSESSMENT:  54-year-old male with known type 1 diabetes and chronic allograft injury post renal transplant is admitted with acute kidney injury and volume overload with pulmonary edema  Endocrine consultation for diabetes management  Hospital Problem List:     Principal Problem:    Acute respiratory failure (HCC)  Active Problems:    Type 1 diabetes mellitus ([facility] [address] )    Renal transplant, status post    Hypertension    Non-ST elevation myocardial infarction (NSTEMI), type 2    Stage 5 chronic kidney disease not on chronic dialysis ([facility])    Depressed left ventricular ejection fraction    Acute CHF (congestive heart failure) ([facility])      PLAN:    · Type 1 diabetes mellitus  He reports recent labile control with the multiple hospitalizations, steroid use, changes in volume status and kidney functions  He usually takes Levemir 7 units twice daily and flexible insulin therapy for meals  Given acute kidney injury, we will reduce his Levemir to 7 units q daily, Humalog 3 units TIDAC and sliding scale insulin and monitor blood glucose  Reason for consultation:   Hypoglycemia    History of Present Illness:    Isadora Gómez is a 51 y o  male who presents with noncardiac pulmonary edema in context of acute on chronic renal failure  He has known history of hypertension, type 1 diabetes, coronary artery disease status post stents, history of 2 renal transplants with chronic allograft injury, baseline creatinine 3 5 and multiple hospitalizations  He was admitted as a transfer from [address] [place] [address] after presenting there with pulmonary edema  He is being evaluated by Cardiology and Nephrology and is contemplated for dialysis initiation    At presentation he had hyperglycemia for which she was placed on an insulin drip  This morning he had hypoglycemia at the point of transition from IV to basal insulin  Patient reports history of type 1 diabetes diagnosed at age 10 associated with complications including nephropathy neuropathy and retinopathy  He also had end-stage renal disease and is s/ p LRKT in 1989, DDKT in 2001 and Islet cell transplant in 2007  He follows with renal transplant team and Endocrinology at [facility]  His usual insulin dosages Levemir 7 units twice daily and Humalog with meals  He is also on chronic prednisone and other immune suppressing medications  He presently denies any nausea vomiting abdominal pain presyncope syncope fever chills or diarrhea    Review of Systems:    Review of Systems   Constitutional: Positive for activity change and fever  HENT: Negative  Eyes: Negative  Respiratory: Positive for shortness of breath  Cardiovascular: Positive for palpitations  Gastrointestinal: Positive for abdominal distention  Endocrine: Negative  Genitourinary: Negative  Musculoskeletal: Negative  Skin: Negative  Allergic/Immunologic: Negative  Neurological: Negative  Hematological: Negative  Psychiatric/Behavioral: Negative  All other systems reviewed and are negative        Past Medical and Surgical History:     Past Medical History:   Diagnosis Date    Bacteremia 12/21/2018    Cardiac arrest ([facility] [address] )     Diabetes mellitus ([facility] [address] )     Diabetes mellitus type 1 ([address] )     GI bleed     Hypertension     Infection at site of external fixator pin ([facility] [address] )     MI (myocardial infarction) ([facility] [address] )     Pneumonia     Last Assessed [id number]    Renal failure     Renal transplant, status post 07/21/2007       Past Surgical History:   Procedure Laterality Date    ANKLE FRACTURE SURGERY      ANKLE HARDWARE REMOVAL Right 7/31/2017    Procedure: REMOVAL HARDWARE ANKLE;  Surgeon: Harper Valenzuela MD;  Location: [facility]; Service: Orthopedics    ANKLE HARDWARE REMOVAL Right 8/17/2017    Procedure: TIBIA FAILED HARDWARE REMOVAL;  Surgeon: Harper Valenzuela MD;  Location: [facility];  Service: Orthopedics    CARDIAC SURGERY      2 stents    CLOSED REDUCTION ANKLE Right 7/3/2017    Procedure: CLOSED REDUCTION DISTAL TIB-FIB AND CASTING VS;  Surgeon: Harper Valenzuela MD;  Location: [facility];  Service: Orthopedics    ESOPHAGOGASTRODUODENOSCOPY N/A 12/20/2018    Procedure: ESOPHAGOGASTRODUODENOSCOPY (EGD) in ICU; Surgeon: Andrew Landin MD;  Location: [facility]; Service: Gastroenterology    EYE SURGERY      FRACTURE SURGERY      ORIF Rt Ankle    GLUTAMIC ACID DECARBOXYLASE (HISTORICAL)      IR PICC LINE  8/20/2018    IR VENOUS LINE REMOVAL  10/5/2018    LEG AMPUTATION Right 02/01/2019    Above the knee    NEPHRECTOMY TRANSPLANTED ORGAN      VA OPEN TREATMENT FRACTURE DISTAL TIBIA FIBULA Right 7/3/2017    Procedure: OPEN REDUCTION W/ INTERNAL FIXATION (ORIF); Surgeon: Harper Valenzuela MD;  [facility]: [facility];  Service: Orthopedics    TOE AMPUTATION Right 10/27/2016    Procedure: AMPUTATION TOE;  Surgeon: Lopez Pennington DPM;  Location: [facility];  Service:        Meds/Allergies:    PTA meds:   Prior to Admission Medications   Prescriptions Last Dose Informant Patient Reported? Taking?    NIFEdipine (ADALAT CC) 30 MG 24 hr tablet   No No   Sig: Take 1 tablet (30 mg total) by mouth daily   Patient taking differently: Take 30 mg by mouth daily    albuterol (2 5 mg/3 mL) 0 083 % nebulizer solution   Yes No   Sig: Inhale 2 5 mg   aspirin 81 mg chewable tablet   Yes No   Sig: Chew 81 mg daily   atorvastatin (LIPITOR) 80 mg tablet   Yes No   Sig: Take 80 mg by mouth daily    azaTHIOprine (IMURAN) 50 mg tablet   Yes No   Sig: Take 50 mg by mouth   calcitriol (ROCALTROL) 0 5 MCG capsule  Self Yes No   Sig: Take 1 mcg by mouth daily Monday Wednesday, Friday   chlorhexidine (PERIDEX) 0 12 % solution   No No   Sig: Apply 15 mL to the mouth or throat every 12 (twelve) hours   Patient not taking: Reported on 10/20/2019   cholecalciferol 1000 units tablet   No No   Sig: Take 1 tablet (1,000 Units total) by mouth daily   glucagon (GLUCAGON EMERGENCY) 1 MG injection   No No   Sig: Inject 1 mg under the skin once as needed for low blood sugar for up to 1 dose   glucagon (GLUCAGON EMERGENCY) 1 MG injection   No No   Sig: Inject 1 mg under the skin once as needed for low blood sugar for up to 1 dose   insulin detemir (LEVEMIR) 100 units/mL subcutaneous injection  Self Yes No   Sig: Inject 7 Units under the skin 2 (two) times a day   insulin lispro (HumaLOG) 100 units/mL injection  Self Yes No   Sig: Inject 10 Units under the skin daily   insulin regular (HumuLIN R,NovoLIN R) infusion   No No   Sig: Infuse 0 1-30 Units/hr into a venous catheter continuous   metoprolol succinate (TOPROL-XL) 25 mg 24 hr tablet   No No   Sig: Take 2 tablets (50 mg total) by mouth daily   pantoprazole (PROTONIX) 40 mg tablet   No No   Sig: Take 1 tablet (40 mg total) by mouth daily in the early morning   predniSONE 5 mg tablet   Yes No   Sig: Take 5 mg by mouth daily   sevelamer (RENAGEL) 800 mg tablet   No No   Sig: Take 1 tablet (800 mg total) by mouth 3 (three) times a day with meals   tacrolimus (PROGRAF) 1 mg capsule   No No   Sig: 3 caps in the AM, 2 caps PM/HS   torsemide (DEMADEX) 20 mg tablet   No No   Sig: Take 1 tablet (20 mg total) by mouth daily      Facility-Administered Medications: None       Allergies: No Known Allergies  History:     Marital Status: /Civil Union   Occupation:   Substance Use History:   Social History     Substance and Sexual Activity   Alcohol Use Not Currently     Social History     Tobacco Use   Smoking Status Never Smoker   Smokeless Tobacco Never Used     Social History     Substance and Sexual Activity   Drug Use Never       Family History:    Family History   Problem Relation Age of Onset    Diabetes Brother     Coronary artery disease Mother        Physical Exam:     Vitals:   Blood Pressure: 112/59 (10/20/19 1500)  Pulse: 81 (10/20/19 1500)  Temperature: 97 6 °F (36 4 °C) (10/20/19 1500)  Temp Source: Oral (10/20/19 1500)  Respirations: 17 (10/20/19 1500)  Height: 5' 4" (162 6 cm) (10/20/19 0300)  Weight - Scale: 54 3 kg (119 lb 11 4 oz) (10/20/19 0300)  SpO2: 94 % (10/20/19 1500)    Physical Exam   Constitutional: He is oriented to person, place, and time  HENT:   Head: Normocephalic  Small build   Eyes: Pupils are equal, round, and reactive to light  Cardiovascular: Normal rate and normal heart sounds  Pulmonary/Chest: Effort normal and breath sounds normal    Abdominal: Soft  Bowel sounds are normal    Musculoskeletal: He exhibits no edema  Neurological: He is alert and oriented to person, place, and time  Skin: There is pallor  Psychiatric: He has a normal mood and affect  Lab and Imaging Results: I have personally reviewed pertinent films in PACS    Results from last 7 days   Lab Units 10/20/19  0419   WBC Thousand/uL 10 90*   HEMOGLOBIN g/dL 7 3*   HEMATOCRIT % 22 8*   PLATELETS Thousands/uL 299   NEUTROS PCT % 74   LYMPHS PCT % 13*   MONOS PCT % 10   EOS PCT % 1     Results from last 7 days   Lab Units 10/20/19  0409   POTASSIUM mmol/L 4 3   CHLORIDE mmol/L 99*   CO2 mmol/L 31   BUN mg/dL 123*   CREATININE mg/dL 5 99*   CALCIUM mg/dL 10 1   ALK PHOS U/L 157*   ALT U/L 42   AST U/L 38     Results from last 7 days   Lab Units 10/20/19  0419   INR  1 09     POC Glucose (mg/dl)   Date Value   10/20/2019 115   10/20/2019 60 (L)   10/20/2019 47 (L)   10/20/2019 57 (L)   10/20/2019 204 (H)   10/20/2019 358 (H)   10/20/2019 416 (H)   10/20/2019 394 (H)   10/15/2019 213 (H)   10/15/2019 225 (H)       Xr Chest 1 View Portable    Result Date: 10/20/2019  Narrative: CHEST INDICATION:   Shortness of breath   COMPARISON:  October 11, 2019 EXAM PERFORMED/VIEWS:  XR CHEST PORTABLE FINDINGS: Cardiomediastinal silhouette appears unremarkable  Worsening dense bilateral perihilar airspace opacities, denser and over a larger area on the right than on the left  Background pulmonary vascular congestive changes  No pneumothorax or pleural effusion  Osseous structures appear within normal limits for patient age  Impression: Dense right greater than left bilateral perihilar airspace opacities suspicious for bilateral pneumonia superimposed upon a background of probable pulmonary vascular congestive change  Workstation performed: [id number]     Xr Chest Portable    Result Date: 10/11/2019  Narrative: CHEST INDICATION:   Shortness of breath    COMPARISON:  Radiograph of the chest on September 23, 2019  EXAM PERFORMED/VIEWS:  XR CHEST PORTABLE FINDINGS:  Interval removal of left-sided central venous catheter, endotracheal, and enteric tubes  The cardiac silhouette is enlarged but stable in size  Redemonstration of aortic arch calcification  Increased opacity overlying the right lower lung field compatible with pneumonia in the appropriate clinical setting  There is pulmonary vascular congestion  No pneumothorax or pleural effusion  Osseous structures appear within normal limits for patient age  Impression: 1  Large increased opacity overlying the right lower lung field compatible with pneumonia in the appropriate clinical setting  2   Cardiomegaly and pulmonary vascular congestion  Workstation performed: [id number]     Xr Chest 1 View Portable    Result Date: 9/24/2019  Narrative: CHEST INDICATION:   ETT confirmation  COMPARISON:  None EXAM PERFORMED/VIEWS:  XR CHEST PORTABLE FINDINGS:  Right IJ catheter with tip at the cavoatrial junction  Appropriately positioned endotracheal tube with tip 3 3 cm from the james  Enteric tube tip projects over the gastroesophageal junction and side port projects over the distal esophagus   Cardial mediastinal silhouette and pulmonary vasculature are normal when accounting for AP technique and patient rotation  Increasing patchy opacities at the right lung base  No left-sided consolidation  No pneumothorax or effusion  Osseous structures appear within normal limits for patient age  Impression: 1  Enteric tube tip projects in the region of the gastroesophageal junction  Side-port is in the distal esophagus  Consider advancing  2   Patchy right basilar consolidations concerning for pneumonia or aspiration pneumonitis again noted  3   Appropriately positioned endotracheal tube and right IJ catheter  Note: The study was marked in EPIC for significant notification  Workstation performed: [id number]     Xr Chest 1 View Portable    Result Date: 9/24/2019  Narrative: CHEST INDICATION:   Central line placement  COMPARISON:  None EXAM PERFORMED/VIEWS:  XR CHEST PORTABLE FINDINGS:  Right IJ catheter tip at the cavoatrial junction  Improved aeration  Decreasing right basilar consolidation though there is still an opacity obscuring branching of the right infrahilar vasculature  No left-sided consolidation  No pneumothorax or effusion  Normal cardiomediastinal silhouette and pulmonary vasculature when accounting for patient rotation  Bones are unremarkable  Impression: Some of the atelectasis at the right lung base has resolved with improved inspiratory effort but there is still a subtle opacity obscuring vascular branching concerning for right basilar pneumonia  Appropriately positioned IJ catheter  Note: I agree with the preliminary interpretation by the ED care provider documented in Epic  Workstation performed: [id number]     Xr Chest Portable    Result Date: 9/24/2019  Narrative: CHEST INDICATION:   tachypneic, coarse breath sounds  COMPARISON:  None EXAM PERFORMED/VIEWS:  XR CHEST PORTABLE FINDINGS: Basilar consolidation in the right lung, potentially in the middle lobe  No left-sided consolidation  No pneumothorax or effusion  Normal cardiomediastinal silhouette and pulmonary vasculature  Bones are unremarkable  Impression: Right basilar consolidation could represent atelectasis or developing pneumonia  Note: I agree with the preliminary interpretation by the ED care provider documented in Epic  Workstation performed: [id number]     Us Kidney And Bladder    Result Date: 10/15/2019  Narrative: RENAL ULTRASOUND INDICATION:   Renal insufficiency    COMPARISON: 12/19/2018 TECHNIQUE:   Ultrasound of the retroperitoneum was performed with a curvilinear transducer utilizing volumetric sweeps and still imaging techniques  FINDINGS: KIDNEYS: Right lower quadrant renal transplant is noted measuring 10 0 x 5 6 cm  No hydronephrosis is identified  No masses are present  URETERS: Nonvisualized  BLADDER: Normally distended  No focal thickening or mass lesions  Bilateral ureteral jets detected  Mild bladder wall thickening is present  Impression: Normal-appearing right lower quadrant renal transplant  Mild bladder wall thickening  Workstation performed: [id number]         ** Please Note: Dragon 360 Dictation voice to text software may have been used in the creation of this document   **

## 2019-10-20 NOTE — H&P
I have personally seen and examined patient and reviewed all data with resident/ MLP/ medical student  Agree with note, assessment and plan  Critical care time 41min  Critical care time does not include procedures, family meeting or teaching  Please refer to Critical Care resident H&P for full details on medications, past medical history and review of systems    Acute hypoxic respiratory failure  Acute pulmonary edema  Hypertension with hypertensive emergency  Mitral regurgitation  Pulmonary hypertension  Coronary artery disease with history of stent x2 and cardiac arrest  Metabolic acidosis  Chronic kidney disease with baseline creatinine of 5 3-5 6 with history renal transplant 2001 on chronic immunosuppression medications  Diabetes mellitus type 1 with Hyperglycemia  Hyperlipidemia-continue statin  History of right lower extremity below-knee amputation secondary to infection related to orthopedic hardware placement  Hypokalemia-replete and recheck    Patient presented to [facility] with acute shortness of breath  EMS initially evaluated patient and found saturations to be 80%, they place patient on a rib either  In rate patient had not improved in saturation or work of breathing and CPAP was initiated  BiPAP was initiated upon arrival to emergency department with nitroglycerin infusion  Patient improved with management for pulmonary edema  In addition to management of pulmonary edema patient also received antibiotics for possible pneumonia  Of note patient had a recent admission 10/11/2019-10/15/2019 for which he presented in respiratory distress secondary to pulmonary edema with hypertensive urgency  Patient appears to be in similar presentation to prior admission  Continue with nitroglycerin  Administer Lasix 80 mg IV x1 now and reassess for appropriateness of diuresis  Continue to monitor patient's electrolytes as well as acid-base status    Patient's creatinine has improved since his last presentation  Continue to trend BUN and creatinine as well as urine output  If patient does not diurese appropriately administer 2nd dose of 80 of Lasix  Nephrology consultation  Continue with immunosuppression medication and check levels for appropriate dosing  Maintain on BiPAP with plan to assess patient transitioned off BiPAP if he remains appropriate with oxygen saturation of work of breathing  Continue on nitroglycerin for afterload reduction  It seems patient has hypertensive emergency with likely increased afterload resulting in flash pulmonary edema, similar to prior admission  Echocardiogram was performed on 10/11/2019 with ejection fraction of 52% and mild hypokinesis of the basal inferior walls  There was also moderate hypokinesis of the basal mid inferior septal wall  Patient was also noted to have a small VSD and moderate mitral regurgitation  These findings may be contributing to patient's overall flash pulmonary edema events  He was also noted to have severe pulmonary hypertension on last echocardiogram which may be secondary to acute volume overloaded state  Consider heart failure service consultation pending patient's clinical status with re-evaluation in the morning  Initiate insulin protocol for glycemic control with infusion starting at algorithm 2  Continue to trend patient's blood glucose  Antibiotics initiated secondary to concern for pneumonia with chest x-ray findings of bilateral pulmonary infiltrates  Likely infiltrates are secondary to pulmonary edema  Procalcitonin slightly elevated  Continue to trend procalcitonin  Blood cultures obtained  Patient's blood cultures remain negative times 48 hours and procalcitonin does not elevate consider discontinuing antibiotics

## 2019-10-20 NOTE — PROGRESS NOTES
Vancomycin Assessment    Doug Bryant is a 51 y o  male who is currently receiving vancomycin [id number] for Pneumonia     Relevant clinical data and objective history reviewed:  Creatinine   Date Value Ref Range Status   10/19/2019 3 44 (H) 0 60 - 1 30 mg/dL Final     Comment:     Standardized to IDMS reference method   10/18/2019 5 62 (H) 0 60 - 1 30 mg/dL Final     Comment:     Standardized to IDMS reference method   10/15/2019 5 49 (H) 0 60 - 1 30 mg/dL Final     Comment:     Standardized to IDMS reference method   11/06/2015 1 26 0 60 - 1 30 mg/dL Final     Comment:     Standardized to IDMS reference method   06/05/2015 1 07 0 60 - 1 30 mg/dL Final     Comment:     Standardized to IDMS reference method   12/14/2014 1 13 0 60 - 1 30 mg/dL Final     Comment:     Standardized to IDMS reference method     Vancomycin Rm   Date Value Ref Range Status   12/21/2018 14 3 ug/mL Final     /79   Pulse 82   Temp 98 5 °F (36 9 °C) (Rectal)   Resp 16   Ht 5' 4" (1 626 m)   Wt 54 3 kg (119 lb 11 4 oz)   SpO2 100%   BMI 20 55 kg/m²   No intake/output data recorded  Lab Results   Component Value Date/Time    BUN 78 (H) 10/19/2019 11:00 PM    BUN 31 (H) 11/06/2015 07:34 AM    WBC 15 39 (H) 10/19/2019 11:00 PM    WBC 6 52 11/06/2015 07:34 AM    HGB 9 6 (L) 10/19/2019 11:00 PM    HGB 11 7 (L) 11/06/2015 07:34 AM    HCT 29 9 (L) 10/19/2019 11:00 PM    HCT 35 4 (L) 11/06/2015 07:34 AM    MCV 96 10/19/2019 11:00 PM    MCV 89 11/06/2015 07:34 AM     (H) 10/19/2019 11:00 PM     11/06/2015 07:34 AM     Temp Readings from Last 3 Encounters:   10/20/19 98 5 °F (36 9 °C) (Rectal)   10/15/19 98 3 °F (36 8 °C) (Temporal)   10/11/19 (!) 96 7 °F (35 9 °C) (Temporal)     Vancomycin Days of Therapy:  1    Assessment/Plan  The patient is currently on vancomycin utilizing scheduled dosing based on actual body weight  Baseline risks associated with therapy include: pre-existing renal impairment    The patient is currently receiving [id number] and is clinically appropriate and dose will be continued  Pharmacy will also follow closely for s/sx of nephrotoxicity, infusion reactions and appropriateness of therapy  BMP and CBC will be ordered per protocol  Plan for trough as patient approaches steady state, prior to the 3rd  dose at approximately 0430 10/22  Due to infection severity, will target a trough of 15-20 (appropriate for most indications)   Pharmacy will continue to follow the patients culture results and clinical progress daily      Christen Reaves, Pharmacist

## 2019-10-20 NOTE — H&P
History and Physical - Critical Care  Isadora Oar 51 y o  male MRN: [id number]  Unit/Bed#: [facility] 08 Encounter: [id number]     Reason for Admission / Chief Complaint: respiratory distress     History of Present Illness:  Isadora Oajenny is a 51 y o  male who presents to the ICU with respiratory distress  Patient has a history of chronic kidney disease, status post renal transplant in 2001, on tacrolimus, diabetes, CHF, with last echo 10/1, EF of 52%, who presented to the emergency department 10/19, with sudden onset of shortness of breath  He denied cough fevers or sick contacts  The patient was placed on BiPAP in the ED, and also placed on a nitro drip  He did have relief of his symptoms  The patient states that he has been taking his medications as prescribed  He does take 20 of torsemide daily  States that his recent admission and discharge on 10/15, was for similar symptoms  Patient was transferred to the ICU, and [facility], given that the patient had requirement for continuous BiPAP, and history of renal transplant  History obtained from chart review and the patient       Past Medical History:  Past Medical History:   Diagnosis Date    Bacteremia 12/21/2018    [facility] [address] [facility]     Diabetes mellitus [facility] [address] [facility]     Diabetes mellitus type 1 ([facility] [address] [facility]     GI bleed     Hypertension     Infection at site of external fixator pin ([address]     MI (myocardial infarction) ([facility] [address] [facility]     Pneumonia     Last Assessed [id number]    Renal failure     Renal transplant, status post 07/21/2007        Past Surgical History:  Past Surgical History:   Procedure Laterality Date    ANKLE FRACTURE SURGERY      ANKLE HARDWARE REMOVAL Right 7/31/2017    Procedure: REMOVAL HARDWARE ANKLE;  Surgeon: Migel Swanson MD;  [facility];  Service: Orthopedics    ANKLE HARDWARE REMOVAL Right 8/17/2017    Procedure: TIBIA FAILED HARDWARE REMOVAL;  Surgeon: Migel Swanson MD;  [facility];  Service: Orthopedics   Rossy Baez CARDIAC SURGERY      2 stents    CLOSED REDUCTION ANKLE Right 7/3/2017    Procedure: CLOSED REDUCTION DISTAL TIB-FIB AND CASTING VS;  Surgeon: Shireen Gee MD;  Location[facility];  Service: Orthopedics    ESOPHAGOGASTRODUODENOSCOPY N/A 12/20/2018    Procedure: ESOPHAGOGASTRODUODENOSCOPY (EGD) in ICU; Surgeon: Awilda Castaneda MD;  Location: [facility]; Service: Gastroenterology    EYE SURGERY      FRACTURE SURGERY      ORIF Rt Ankle    GLUTAMIC ACID DECARBOXYLASE (HISTORICAL)      IR PICC LINE  8/20/2018    IR VENOUS LINE REMOVAL  10/5/2018    LEG AMPUTATION Right 02/01/2019    Above the knee    NEPHRECTOMY TRANSPLANTED ORGAN      OR OPEN TREATMENT FRACTURE DISTAL TIBIA FIBULA Right 7/3/2017    Procedure: OPEN REDUCTION W/ INTERNAL FIXATION (ORIF);   Surgeon: Shireen Gee MD;  [facility];  Service: Orthopedics    TOE AMPUTATION Right 10/27/2016    Procedure: AMPUTATION TOE;  Surgeon: Edward Solis DPM;  Location[facility];  Service:         Past Family History:  Family History   Problem Relation Age of Onset    Diabetes Brother     Coronary artery disease Mother         Social History:  Social History     Tobacco Use   Smoking Status Never Smoker   Smokeless Tobacco Never Used     Social History     Substance and Sexual Activity   Alcohol Use Not Currently     Social History     Substance and Sexual Activity   Drug Use Never     Marital Status: /Civil Union     Medications:  Current Facility-Administered Medications   Medication Dose Route Frequency    aspirin chewable tablet 81 mg  81 mg Oral Daily    atorvastatin (LIPITOR) tablet 80 mg  80 mg Oral Daily With Dinner    azaTHIOprine (IMURAN) tablet 50 mg  50 mg Oral Daily    chlorhexidine (PERIDEX) 0 12 % oral rinse 15 mL  15 mL Swish & Spit Q12H [address]    heparin (porcine) subcutaneous injection 5,000 Units  5,000 Units Subcutaneous Q8H [address]    insulin regular (HumuLIN R,NovoLIN R) 1 Units/mL in sodium chloride 0 9 % 100 mL infusion  0 3-21 Units/hr Intravenous Titrated    metoprolol succinate (TOPROL-XL) 24 hr tablet 50 mg  50 mg Oral Daily    NIFEdipine (PROCARDIA XL) 24 hr tablet 60 mg  60 mg Oral Daily    nitroGLYcerin (TRIDIL) 50 mg in 250 mL infusion (premix)  5-200 mcg/min Intravenous Titrated    pantoprazole (PROTONIX) EC tablet 40 mg  40 mg Oral Early Morning    predniSONE tablet 5 mg  5 mg Oral Daily    sevelamer (RENAGEL) tablet 800 mg  800 mg Oral TID With Meals    tacrolimus (PROGRAF) capsule 2 mg  2 mg Oral HS    tacrolimus (PROGRAF) capsule 3 mg  3 mg Oral QAM     Home medications:  Prior to Admission medications    Medication Sig Start Date End Date Taking?  Authorizing Provider   albuterol (2 5 mg/3 mL) 0 083 % nebulizer solution Inhale 2 5 mg 2/8/19   Historical Provider, MD   aspirin 81 mg chewable tablet Chew 81 mg daily    Historical Provider, MD   atorvastatin (LIPITOR) 80 mg tablet Take 80 mg by mouth daily     Historical Provider, MD   azaTHIOprine (IMURAN) 50 mg tablet Take 50 mg by mouth 10/18/18   Historical Provider, MD   calcitriol (ROCALTROL) 0 5 MCG capsule Take 1 mcg by mouth daily Monday Wednesday, Friday    Historical Provider, MD   chlorhexidine (PERIDEX) 0 12 % solution Apply 15 mL to the mouth or throat every 12 (twelve) hours  Patient not taking: Reported on 10/20/2019 9/24/19   GAVINO Flynn   cholecalciferol 1000 units tablet Take 1 tablet (1,000 Units total) by mouth daily 10/16/19   Jorge Reid MD   glucagon (GLUCAGON EMERGENCY) 1 MG injection Inject 1 mg under the skin once as needed for low blood sugar for up to 1 dose 10/18/19   Donnal Leaf, DO   glucagon (GLUCAGON EMERGENCY) 1 MG injection Inject 1 mg under the skin once as needed for low blood sugar for up to 1 dose 10/18/19   Ronni Leaf, DO   insulin detemir (LEVEMIR) 100 units/mL subcutaneous injection Inject 7 Units under the skin 2 (two) times a day    Historical Provider, MD   insulin lispro (HumaLOG) 100 units/mL injection Inject 10 Units under the skin daily    Historical Provider, MD   insulin regular (HumuLIN R,NovoLIN R) infusion Infuse 0 1-30 Units/hr into a venous catheter continuous 9/24/19   GAVINO Coleman   metoprolol succinate (TOPROL-XL) 25 mg 24 hr tablet Take 2 tablets (50 mg total) by mouth daily 4/8/19   Joss Alanis DO   NIFEdipine (ADALAT CC) 30 MG 24 hr tablet Take 1 tablet (30 mg total) by mouth daily  Patient taking differently: Take 30 mg by mouth daily  10/16/19   Christian Aquino MD   pantoprazole (PROTONIX) 40 mg tablet Take 1 tablet (40 mg total) by mouth daily in the early morning 10/16/19   Christian Aquino MD   predniSONE 5 mg tablet Take 5 mg by mouth daily    Historical Provider, MD   sevelamer (RENAGEL) 800 mg tablet Take 1 tablet (800 mg total) by mouth 3 (three) times a day with meals 10/15/19   Christian Aquino MD   tacrolimus (PROGRAF) 1 mg capsule 3 caps in the AM, 2 caps PM/HS 3/4/19   Joss Alanis DO   torsemide [facility]) 20 mg tablet Take 1 tablet (20 mg total) by mouth daily 10/15/19   Christian Aquino MD     Allergies:  No Known Allergies     ROS:   Review of Systems   Constitutional: Negative for chills, fatigue and fever  HENT: Negative for nosebleeds and sore throat  Eyes: Negative for redness and visual disturbance  Respiratory: Positive for shortness of breath  Negative for wheezing  Cardiovascular: Negative for chest pain and palpitations  Gastrointestinal: Negative for abdominal pain and diarrhea  Endocrine: Negative for polyuria  Genitourinary: Negative for difficulty urinating and testicular pain  Musculoskeletal: Negative for back pain and neck stiffness  Skin: Negative for rash and wound  Neurological: Negative for seizures, speech difficulty and headaches  Psychiatric/Behavioral: Negative for dysphoric mood and hallucinations  All other systems reviewed and are negative         Vitals:  Vitals:    10/20/19 0238 10/20/19 0248 10/20/19 0300   BP:  (!) 187/91 168/79   BP Location:  Right arm    Pulse: 85 84 82   Resp: (!) 32 16 16   Temp:  98 5 °F (36 9 °C)    TempSrc:  Rectal    SpO2: 100% 100% 100%   Weight:   54 3 kg (119 lb 11 4 oz)   Height:   5' 4" (1 626 m)     Temperature:   Temp (24hrs), Av 5 °F (36 9 °C), Min:98 5 °F (36 9 °C), Max:98 5 °F (36 9 °C)    Current: Temperature: 98 5 °F (36 9 °C)     Weights:   IBW: 59 2 kg  Body mass index is 20 55 kg/m²  Hemodynamic Monitoring:  N/A     Non-Invasive/Invasive Ventilation Settings:  Respiratory    Lab Data (Last 4 hours)    None         O2/Vent Data (Last 4 hours)      10/19 2327 10/20 023        Non-Invasive Ventilation Mode CPAP BiPAP                No results found for: PHART, WQS4NYF, PO2ART, AMF2EVN, L0SLJMPA, BEART, SOURCE  SpO2: SpO2: 100 %     Physical Exam:  Physical Exam   Constitutional: He is oriented to person, place, and time  He appears well-developed and well-nourished  HENT:   Head: Normocephalic and atraumatic  Right Ear: External ear normal    Left Ear: External ear normal    Mouth/Throat: Oropharynx is clear and moist    Eyes: Conjunctivae and EOM are normal    Neck: Normal range of motion  Cardiovascular: Normal rate, regular rhythm, normal heart sounds and intact distal pulses  Pulmonary/Chest: Effort normal  He has rales  He exhibits no tenderness  Intermittent rales noted, with diminished breath sounds at the bases  Currently on BiPAP   Abdominal: Soft  Bowel sounds are normal  There is no tenderness  There is no guarding  Musculoskeletal: Normal range of motion  Right BKA   Neurological: He is alert and oriented to person, place, and time  No cranial nerve deficit or sensory deficit  He exhibits normal muscle tone  Coordination normal    Skin: Skin is warm and dry  No rash noted  Psychiatric: He has a normal mood and affect  Nursing note and vitals reviewed         Labs:  Results from last 7 days   Lab Units 10/19/19  2300 10/18/19  0805 10/15/19  0528   WBC Thousand/uL 15 39* 11 94* 8 19   HEMOGLOBIN g/dL 9 6* 8 4* 8 5*   HEMATOCRIT % 29 9* 26 8* 26 7*   PLATELETS Thousands/uL 472* 318 320   NEUTROS PCT % 69 68 57   MONOS PCT % 8 8 8      Results from last 7 days   Lab Units 10/19/19  2300 10/18/19  0805 10/15/19  0528 10/14/19  0711 10/13/19  0625   SODIUM mmol/L 137 142 135* 138 136   POTASSIUM mmol/L 3 4* 4 6 4 2 4 1 4 5   CHLORIDE mmol/L 111* 102 99* 101 98*   CO2 mmol/L 18* 29 24 24 26   BUN mg/dL 78* 116* 121* 126* 120*   CREATININE mg/dL 3 44* 5 62* 5 49* 5 39* 5 28*   CALCIUM mg/dL 5 4* 9 0 8 2* 7 9* 8 1*   ALK PHOS U/L 131*  --   --  81 96   ALT U/L 26  --   --  27 36   AST U/L 55*  --   --  44 53*     Results from last 7 days   Lab Units 10/19/19  2300   MAGNESIUM mg/dL 2 3     Results from last 7 days   Lab Units 10/14/19  0711 10/13/19  0625   PHOSPHORUS mg/dL 6 3* 6 6*      Results from last 7 days   Lab Units 10/19/19  2300   INR  0 93   PTT seconds 31     Results from last 7 days   Lab Units 10/19/19  2300   LACTIC ACID mmol/L 2 8*     0   Lab Value Date/Time    TROPONINI 0 05 (H) 10/19/2019 2300    TROPONINI 0 06 (H) 10/11/2019 0346    TROPONINI 14 01 (H) 09/24/2019 0928    TROPONINI 5 16 (H) 09/24/2019 0554    TROPONINI 1 78 (H) 09/24/2019 0307    TROPONINI 0 80 (HH) 09/23/2019 2352    TROPONINI 0 11 (HH) 12/29/2018 2330    TROPONINI 0 25 (H) 12/19/2018 2104    TROPONINI 0 26 (H) 12/19/2018 1807    TROPONINI 0 38 (HH) 12/19/2018 0828    TROPONINI 0 04 04/12/2018 1829    TROPONINI 3 35 (HH) 02/14/2018 0133    TROPONINI 0 46 (HH) 09/24/2017 1724    TROPONINI 0 48 () 09/24/2017 1400    TROPONINI 0 51 () 09/23/2017 1129    TROPONINI 0 22 () 09/23/2017 0857    TROPONINI 0 05 () 09/23/2017 0516    TROPONINI <0 02 10/26/2016 1942    TROPONINI <0 04 12/07/2014 0915    TROPONINI 0 05 (H) 12/06/2014 2200        Imaging:   10/19: Cxray: vascular congestion with possible RLL infiltrate        EKG: sinus tachycardia    Micro:  Lab Results   Component Value Date    BLOODCX No Growth After 5 Days  10/11/2019    BLOODCX No Growth After 5 Days  09/23/2019    BLOODCX No Growth After 5 Days  09/23/2019    URINECX No Growth <1000 cfu/mL 02/23/2019    URINECX No Growth <1000 cfu/mL 07/27/2016    WOUNDCULT 3+ Growth of Staphylococcus aureus (A) 12/21/2018    WOUNDCULT 1+ Growth of Diphtheroids 07/31/2017    WOUNDCULT 2+ Growth of Diphtheroids 07/31/2017       Assessment:  51 y o  male who presents to the ICU with respiratory distress secondary to likely CHF exacerbation, currently improved and stable on BiPap  Plan:    Neuro:   No active issues     CV: Goal MAP > 65    //CHF  -BNP: 49K  -nitro drip   -lasix  -prior echo 10/11 showing EF of 52%    //NSTEMI  -Trop 0 05  -likely from demand, trend till downtrending or neg     //HTN:  -home metoprolol and nifedipine (nifedipine increased to 60 mg daily in last week)  -home torsemide held given lasix     //HLD:  -home statin    Pulm: Goal SpO2 > 95    //Respiraory distress  -likely from flash pulmonary edema from volume overload  -Bipap currently 14/6  -lasix 80 given home torsemide 40 daily  //possible PNA;  -vanc cef flagyl with pharmacy consult for renal dosing  -follow procalcitonin  -follow blood cultures, sepsis protocol initiated    GI:     //Stress ulcer ppx: protonix daily (hx of esophagitis)    //Bowel Regimen: colace, senna     : Trend Is and Os  Trend UOP and BUN/creatinine     //Chronic renal failure  -Stage 5 CKD, not on dialysis  -Cr baseline 4 5-5 5  Patient current Cr 3   -home tacrolimus, prednisone renagel and azathiprine  [ ] Renal consult placed to help CKD and HTN    //Indwelling Chowdary present: no        F/E/N:     //Fluids: held in setting of volume overload   -- Goal 24 fluid balance: negative    //Electrolytes: Replete PRN   Goals K >4 0, Mag >2 0, and Phos >3 0    //Nutrition: NPO       ID: Trend white count and fever curve   -see above for PNA     Heme: Trend H and H   -hx of Anemia, Hg stable 9 6 on 10/19    //DVT ppx: heparin     Endo:    //DM:   -Last HgA1c 4 8 on 10/11  -insulin drip Alg 2 for Hyperglycemia     Msk/Skin: Frequent turning and pressure offloading    //OOB if possible  //PT/OT to see patient when able     Disposition: ICU level care       VTE Pharmacologic Prophylaxis: Heparin  VTE Mechanical Prophylaxis: sequential compression device     Invasive lines and devices: Invasive Devices     Peripheral Intravenous Line            Peripheral IV 10/19/19 Left Antecubital less than 1 day    Peripheral IV 10/19/19 Right Antecubital less than 1 day    Peripheral IV 10/20/19 Right Wrist less than 1 day                 Code Status: Level 1 - Full Code  POA:    POLST:       Given critical illness, patient length of stay will require greater than two midnights  Counseling / Coordination of Care  Total Critical Care time spent 30 minutes excluding procedures, teaching and family updates  Portions of the record may have been created with voice recognition software  Occasional wrong word or "sound a like" substitutions may have occurred due to the inherent limitations of voice recognition software  Read the chart carefully and recognize, using context, where substitutions have occurred          Carlos Garrett MD

## 2019-10-20 NOTE — SEPSIS NOTE
Sepsis Note   Dot Oar 51 y o  male MRN: [id number]  Unit/Bed#: [facility]03 Encounter: [id number]      qSOFA     Row Name 10/20/19 0043 10/20/19 0041 10/20/19 0026 10/20/19 0021 10/20/19 0015    Altered mental status GCS < 15              Respiratory Rate > / =22  0  0  1  1  1    Systolic BP < / =661  0  0  0  0  0    Q Sofa Score  0  0  1  1  1    Row Name 10/20/19 0010 10/20/19 0006 10/20/19 0001 10/19/19 2355 10/19/19 2350    Altered mental status GCS < 15              Respiratory Rate > / =22  1  1  1  1  1    Systolic BP < / =637  0  0  0  0  0    Q Sofa Score  1  1  1  1  1    Row Name 10/19/19 2345 10/19/19 2340 10/19/19 2335 10/19/19 2330 10/19/19 2325    Altered mental status GCS < 15              Respiratory Rate > / =22  1  1  1  1  1    Systolic BP < / =268  0  0  0  0  0    Q Sofa Score  1  1  1  1  1    Row Name 10/19/19 2320 10/19/19 2317 10/19/19 2310 10/19/19 2305 10/19/19 2253    Altered mental status GCS < 15              Respiratory Rate > / =22  1  1  1  1  1    Systolic BP < / =665  0  0  0  0  0    Q Sofa Score  1  1  1  1  1        Initial Sepsis Screening     Row Name 10/19/19 2356                Is the patient's history suggestive of a new or worsening infection? (!) Yes (Proceed)  -CC        Suspected source of infection  pneumonia  -CC        Are two or more of the following signs & symptoms of infection both present and new to the patient?   (!) Yes (Proceed)  -CC        Indicate SIRS criteria  Tachycardia > 90 bpm;Tachypnea > 20 resp per min;Leukocytosis (WBC > 00133 IJL)  -CC        If the answer is yes to both questions, suspicion of sepsis is present          If severe sepsis is present AND tissue hypoperfusion perists in the hour after fluid resuscitation or lactate > 4, the patient meets criteria for SEPTIC SHOCK          Are any of the following organ dysfunction criteria present within 6 hours of suspected infection and SIRS criteria that are NOT considered to be chronic conditions? (!) Yes  -CC        Organ dysfunction  Lactate > 2 0 mmol/L  -CC        Date of presentation of severe sepsis  10/19/19  -CC        Time of presentation of severe sepsis  6576  -CC        Tissue hypoperfusion persists in the hour after crystalloid fluid administration, evidenced, by either:          Was hypotension present within one hour of the conclusion of crystalloid fluid administration? No  -CC        Date of presentation of septic shock          Time of presentation of septic shock            User Key  (r) = Recorded By, (t) = Taken By, (c) = Cosigned By    Initials Name Provider Type    CC Martina Zepeda MD Physician               Default Flowsheet Data (last 720 hours)      Sepsis Reassess     Row Name 10/20/19 0045                   Repeat Volume Status and Tissue Perfusion Assessment Performed    Repeat Volume Status and Tissue Perfusion Assessment Performed  Yes  -CC           Volume Status and Tissue Perfusion Post Fluid Resuscitation * Must Document All *    Vital Signs Reviewed (HR, RR, BP, T)  Yes  -CC        Shock Index Reviewed  Yes  -CC        Arterial Oxygen Saturation Reviewed (POx, SaO2 or SpO2)  Yes (comment %) 100  -CC        Cardio  Normal S1/S2; Regular rate and rhythm; No murmor  -CC        Pulmonary  (!) Normal effort;Rales; Tachypnea  -CC        Capillary Refill  Brisk  -CC        Peripheral Pulses  Radial  -CC        Peripheral Pulse  +2  -CC        Skin  Warm;Dry;Normal  -CC        Urine output assessed  None  -CC           *OR*   Intensive Monitoring- Must Document One of the Following Four *:    Vital Signs Reviewed          * Central Venous Pressure (CVP or RAP)          * Central Venous Oxygen (SVO2, ScvO2 or Oxygen saturation via central catheter)          * Bedside Cardiovascular US in IVC diameter and % collapse          * Passive Leg Raise OR Crystalloid Challenge            User Key  (r) = Recorded By, (t) = Taken By, (c) = Cosigned By    [address] Name Provider Type    Aleah Adair MD Physician

## 2019-10-21 ENCOUNTER — APPOINTMENT (INPATIENT)
Dept: RADIOLOGY | Facility: HOSPITAL | Age: 49
DRG: 291 | End: 2019-10-21
Attending: INTERNAL MEDICINE
Payer: COMMERCIAL

## 2019-10-21 PROBLEM — N18.9 ACUTE KIDNEY INJURY SUPERIMPOSED ON CKD (HCC): Status: ACTIVE | Noted: 2018-12-20

## 2019-10-21 PROBLEM — J96.01 ACUTE RESPIRATORY FAILURE WITH HYPOXIA (HCC): Status: ACTIVE | Noted: 2019-09-24

## 2019-10-21 PROBLEM — Z89.511 HX OF RIGHT BKA (HCC): Status: ACTIVE | Noted: 2019-10-21

## 2019-10-21 PROBLEM — J81.0 ACUTE PULMONARY EDEMA (HCC): Status: ACTIVE | Noted: 2019-10-20

## 2019-10-21 PROBLEM — N17.9 ACUTE KIDNEY INJURY SUPERIMPOSED ON CKD (HCC): Status: ACTIVE | Noted: 2018-12-20

## 2019-10-21 LAB
ANION GAP SERPL CALCULATED.3IONS-SCNC: 10 MMOL/L (ref 4–13)
ATRIAL RATE: 103 BPM
ATRIAL RATE: 84 BPM
BASOPHILS # BLD AUTO: 0.06 THOUSANDS/ΜL (ref 0–0.1)
BASOPHILS NFR BLD AUTO: 1 % (ref 0–1)
BUN SERPL-MCNC: 108 MG/DL (ref 5–25)
CALCIUM SERPL-MCNC: 9.1 MG/DL (ref 8.3–10.1)
CHLORIDE SERPL-SCNC: 99 MMOL/L (ref 100–108)
CO2 SERPL-SCNC: 28 MMOL/L (ref 21–32)
CREAT SERPL-MCNC: 6.01 MG/DL (ref 0.6–1.3)
EOSINOPHIL # BLD AUTO: 0.32 THOUSAND/ΜL (ref 0–0.61)
EOSINOPHIL NFR BLD AUTO: 3 % (ref 0–6)
ERYTHROCYTE [DISTWIDTH] IN BLOOD BY AUTOMATED COUNT: 15.7 % (ref 11.6–15.1)
GFR SERPL CREATININE-BSD FRML MDRD: 10 ML/MIN/1.73SQ M
GLUCOSE SERPL-MCNC: 143 MG/DL (ref 65–140)
GLUCOSE SERPL-MCNC: 159 MG/DL (ref 65–140)
GLUCOSE SERPL-MCNC: 163 MG/DL (ref 65–140)
GLUCOSE SERPL-MCNC: 193 MG/DL (ref 65–140)
GLUCOSE SERPL-MCNC: 195 MG/DL (ref 65–140)
GLUCOSE SERPL-MCNC: 259 MG/DL (ref 65–140)
HBV CORE AB SER QL: NORMAL
HBV CORE IGM SER QL: NORMAL
HBV SURFACE AB SER-ACNC: <3.1 MIU/ML
HBV SURFACE AG SER QL: NORMAL
HCT VFR BLD AUTO: 23.9 % (ref 36.5–49.3)
HCV AB SER QL: NORMAL
HGB BLD-MCNC: 7.6 G/DL (ref 12–17)
IMM GRANULOCYTES # BLD AUTO: 0.06 THOUSAND/UL (ref 0–0.2)
IMM GRANULOCYTES NFR BLD AUTO: 1 % (ref 0–2)
LYMPHOCYTES # BLD AUTO: 2.2 THOUSANDS/ΜL (ref 0.6–4.47)
LYMPHOCYTES NFR BLD AUTO: 22 % (ref 14–44)
MCH RBC QN AUTO: 30 PG (ref 26.8–34.3)
MCHC RBC AUTO-ENTMCNC: 31.8 G/DL (ref 31.4–37.4)
MCV RBC AUTO: 95 FL (ref 82–98)
MONOCYTES # BLD AUTO: 0.85 THOUSAND/ΜL (ref 0.17–1.22)
MONOCYTES NFR BLD AUTO: 9 % (ref 4–12)
NEUTROPHILS # BLD AUTO: 6.51 THOUSANDS/ΜL (ref 1.85–7.62)
NEUTS SEG NFR BLD AUTO: 64 % (ref 43–75)
NRBC BLD AUTO-RTO: 0 /100 WBCS
P AXIS: -6 DEGREES
P AXIS: 62 DEGREES
PLATELET # BLD AUTO: 281 THOUSANDS/UL (ref 149–390)
PMV BLD AUTO: 8.8 FL (ref 8.9–12.7)
POTASSIUM SERPL-SCNC: 4.2 MMOL/L (ref 3.5–5.3)
PR INTERVAL: 130 MS
PR INTERVAL: 133 MS
PROCALCITONIN SERPL-MCNC: 0.51 NG/ML
QRS AXIS: 26 DEGREES
QRS AXIS: 32 DEGREES
QRSD INTERVAL: 92 MS
QRSD INTERVAL: 94 MS
QT INTERVAL: 366 MS
QT INTERVAL: 400 MS
QTC INTERVAL: 473 MS
QTC INTERVAL: 479 MS
RBC # BLD AUTO: 2.53 MILLION/UL (ref 3.88–5.62)
SODIUM SERPL-SCNC: 137 MMOL/L (ref 136–145)
T WAVE AXIS: -29 DEGREES
T WAVE AXIS: 112 DEGREES
VANCOMYCIN TROUGH SERPL-MCNC: 21 UG/ML (ref 10–20)
VENTRICULAR RATE: 103 BPM
VENTRICULAR RATE: 84 BPM
WBC # BLD AUTO: 10 THOUSAND/UL (ref 4.31–10.16)

## 2019-10-21 PROCEDURE — 76937 US GUIDE VASCULAR ACCESS: CPT

## 2019-10-21 PROCEDURE — 99152 MOD SED SAME PHYS/QHP 5/>YRS: CPT | Performed by: RADIOLOGY

## 2019-10-21 PROCEDURE — 0JH63XZ INSERTION OF TUNNELED VASCULAR ACCESS DEVICE INTO CHEST SUBCUTANEOUS TISSUE AND FASCIA, PERCUTANEOUS APPROACH: ICD-10-PCS | Performed by: RADIOLOGY

## 2019-10-21 PROCEDURE — 02H633Z INSERTION OF INFUSION DEVICE INTO RIGHT ATRIUM, PERCUTANEOUS APPROACH: ICD-10-PCS | Performed by: RADIOLOGY

## 2019-10-21 PROCEDURE — 84145 PROCALCITONIN (PCT): CPT | Performed by: PHYSICIAN ASSISTANT

## 2019-10-21 PROCEDURE — C1750 CATH, HEMODIALYSIS,LONG-TERM: HCPCS

## 2019-10-21 PROCEDURE — 99233 SBSQ HOSP IP/OBS HIGH 50: CPT | Performed by: INTERNAL MEDICINE

## 2019-10-21 PROCEDURE — 93010 ELECTROCARDIOGRAM REPORT: CPT | Performed by: INTERNAL MEDICINE

## 2019-10-21 PROCEDURE — 86704 HEP B CORE ANTIBODY TOTAL: CPT | Performed by: INTERNAL MEDICINE

## 2019-10-21 PROCEDURE — 99222 1ST HOSP IP/OBS MODERATE 55: CPT | Performed by: INTERNAL MEDICINE

## 2019-10-21 PROCEDURE — 83835 ASSAY OF METANEPHRINES: CPT | Performed by: PHYSICIAN ASSISTANT

## 2019-10-21 PROCEDURE — 36558 INSERT TUNNELED CV CATH: CPT | Performed by: RADIOLOGY

## 2019-10-21 PROCEDURE — 99232 SBSQ HOSP IP/OBS MODERATE 35: CPT | Performed by: INTERNAL MEDICINE

## 2019-10-21 PROCEDURE — 36558 INSERT TUNNELED CV CATH: CPT

## 2019-10-21 PROCEDURE — 84244 ASSAY OF RENIN: CPT | Performed by: PHYSICIAN ASSISTANT

## 2019-10-21 PROCEDURE — 82088 ASSAY OF ALDOSTERONE: CPT | Performed by: PHYSICIAN ASSISTANT

## 2019-10-21 PROCEDURE — 86803 HEPATITIS C AB TEST: CPT | Performed by: INTERNAL MEDICINE

## 2019-10-21 PROCEDURE — 87340 HEPATITIS B SURFACE AG IA: CPT | Performed by: INTERNAL MEDICINE

## 2019-10-21 PROCEDURE — 82948 REAGENT STRIP/BLOOD GLUCOSE: CPT

## 2019-10-21 PROCEDURE — 77001 FLUOROGUIDE FOR VEIN DEVICE: CPT

## 2019-10-21 PROCEDURE — 99024 POSTOP FOLLOW-UP VISIT: CPT | Performed by: RADIOLOGY

## 2019-10-21 PROCEDURE — 99152 MOD SED SAME PHYS/QHP 5/>YRS: CPT

## 2019-10-21 PROCEDURE — 76937 US GUIDE VASCULAR ACCESS: CPT | Performed by: RADIOLOGY

## 2019-10-21 PROCEDURE — 85025 COMPLETE CBC W/AUTO DIFF WBC: CPT | Performed by: PHYSICIAN ASSISTANT

## 2019-10-21 PROCEDURE — 86706 HEP B SURFACE ANTIBODY: CPT | Performed by: INTERNAL MEDICINE

## 2019-10-21 PROCEDURE — 86705 HEP B CORE ANTIBODY IGM: CPT | Performed by: INTERNAL MEDICINE

## 2019-10-21 PROCEDURE — 77001 FLUOROGUIDE FOR VEIN DEVICE: CPT | Performed by: RADIOLOGY

## 2019-10-21 PROCEDURE — C1894 INTRO/SHEATH, NON-LASER: HCPCS

## 2019-10-21 PROCEDURE — 80197 ASSAY OF TACROLIMUS: CPT | Performed by: INTERNAL MEDICINE

## 2019-10-21 PROCEDURE — 80048 BASIC METABOLIC PNL TOTAL CA: CPT | Performed by: PHYSICIAN ASSISTANT

## 2019-10-21 PROCEDURE — 80202 ASSAY OF VANCOMYCIN: CPT | Performed by: PHYSICIAN ASSISTANT

## 2019-10-21 RX ORDER — FENTANYL CITRATE 50 UG/ML
INJECTION, SOLUTION INTRAMUSCULAR; INTRAVENOUS CODE/TRAUMA/SEDATION MEDICATION
Status: COMPLETED | OUTPATIENT
Start: 2019-10-21 | End: 2019-10-21

## 2019-10-21 RX ORDER — BUMETANIDE 0.25 MG/ML
1 INJECTION INTRAMUSCULAR; INTRAVENOUS CONTINUOUS
Status: DISPENSED | OUTPATIENT
Start: 2019-10-21 | End: 2019-10-22

## 2019-10-21 RX ORDER — CEFAZOLIN SODIUM 1 G/50ML
SOLUTION INTRAVENOUS
Status: COMPLETED | OUTPATIENT
Start: 2019-10-21 | End: 2019-10-21

## 2019-10-21 RX ORDER — MIDAZOLAM HYDROCHLORIDE 1 MG/ML
INJECTION INTRAMUSCULAR; INTRAVENOUS CODE/TRAUMA/SEDATION MEDICATION
Status: COMPLETED | OUTPATIENT
Start: 2019-10-21 | End: 2019-10-21

## 2019-10-21 RX ORDER — HYDRALAZINE HYDROCHLORIDE 50 MG/1
50 TABLET, FILM COATED ORAL EVERY 8 HOURS SCHEDULED
Status: DISCONTINUED | OUTPATIENT
Start: 2019-10-21 | End: 2019-10-22

## 2019-10-21 RX ORDER — ACETAMINOPHEN 325 MG/1
650 TABLET ORAL EVERY 6 HOURS PRN
Status: DISCONTINUED | OUTPATIENT
Start: 2019-10-21 | End: 2019-10-25 | Stop reason: HOSPADM

## 2019-10-21 RX ADMIN — Medication 10 MG/HR: at 06:27

## 2019-10-21 RX ADMIN — TACROLIMUS 3 MG: 1 CAPSULE ORAL at 08:46

## 2019-10-21 RX ADMIN — HEPARIN SODIUM 5000 UNITS: 5000 INJECTION INTRAVENOUS; SUBCUTANEOUS at 21:39

## 2019-10-21 RX ADMIN — METRONIDAZOLE 500 MG: 500 INJECTION, SOLUTION INTRAVENOUS at 17:44

## 2019-10-21 RX ADMIN — INSULIN LISPRO 3 UNITS: 100 INJECTION, SOLUTION INTRAVENOUS; SUBCUTANEOUS at 17:42

## 2019-10-21 RX ADMIN — INSULIN LISPRO 1 UNITS: 100 INJECTION, SOLUTION INTRAVENOUS; SUBCUTANEOUS at 21:39

## 2019-10-21 RX ADMIN — TACROLIMUS 2 MG: 1 CAPSULE ORAL at 21:39

## 2019-10-21 RX ADMIN — METRONIDAZOLE 500 MG: 500 INJECTION, SOLUTION INTRAVENOUS at 09:21

## 2019-10-21 RX ADMIN — FENTANYL CITRATE 25 MCG: 50 INJECTION, SOLUTION INTRAMUSCULAR; INTRAVENOUS at 14:17

## 2019-10-21 RX ADMIN — METOPROLOL SUCCINATE 50 MG: 50 TABLET, EXTENDED RELEASE ORAL at 08:44

## 2019-10-21 RX ADMIN — ATORVASTATIN CALCIUM 80 MG: 80 TABLET, FILM COATED ORAL at 17:40

## 2019-10-21 RX ADMIN — INSULIN LISPRO 2 UNITS: 100 INJECTION, SOLUTION INTRAVENOUS; SUBCUTANEOUS at 09:23

## 2019-10-21 RX ADMIN — MIDAZOLAM 0.5 MG: 1 INJECTION INTRAMUSCULAR; INTRAVENOUS at 14:17

## 2019-10-21 RX ADMIN — LABETALOL 20 MG/4 ML (5 MG/ML) INTRAVENOUS SYRINGE 10 MG: at 02:36

## 2019-10-21 RX ADMIN — HYDRALAZINE HYDROCHLORIDE 25 MG: 25 TABLET ORAL at 05:11

## 2019-10-21 RX ADMIN — CEFAZOLIN SODIUM 1000 MG: 1 SOLUTION INTRAVENOUS at 14:20

## 2019-10-21 RX ADMIN — INSULIN LISPRO 3 UNITS: 100 INJECTION, SOLUTION INTRAVENOUS; SUBCUTANEOUS at 12:16

## 2019-10-21 RX ADMIN — SEVELAMER HYDROCHLORIDE 800 MG: 800 TABLET, FILM COATED PARENTERAL at 17:40

## 2019-10-21 RX ADMIN — DOCUSATE SODIUM 100 MG: 100 CAPSULE, LIQUID FILLED ORAL at 08:44

## 2019-10-21 RX ADMIN — Medication 1 MG/HR: at 11:46

## 2019-10-21 RX ADMIN — PREDNISONE 5 MG: 5 TABLET ORAL at 08:44

## 2019-10-21 RX ADMIN — ASPIRIN 81 MG 81 MG: 81 TABLET ORAL at 08:44

## 2019-10-21 RX ADMIN — HYDRALAZINE HYDROCHLORIDE 50 MG: 50 TABLET, FILM COATED ORAL at 21:39

## 2019-10-21 RX ADMIN — CEFEPIME HYDROCHLORIDE 1000 MG: 1 INJECTION, POWDER, FOR SOLUTION INTRAMUSCULAR; INTRAVENOUS at 00:07

## 2019-10-21 RX ADMIN — METRONIDAZOLE 500 MG: 500 INJECTION, SOLUTION INTRAVENOUS at 01:06

## 2019-10-21 RX ADMIN — HYDRALAZINE HYDROCHLORIDE 50 MG: 50 TABLET, FILM COATED ORAL at 13:10

## 2019-10-21 RX ADMIN — HEPARIN SODIUM 5000 UNITS: 5000 INJECTION INTRAVENOUS; SUBCUTANEOUS at 05:12

## 2019-10-21 RX ADMIN — NIFEDIPINE 60 MG: 60 TABLET, FILM COATED, EXTENDED RELEASE ORAL at 08:44

## 2019-10-21 RX ADMIN — ACETAMINOPHEN 650 MG: 325 TABLET ORAL at 19:40

## 2019-10-21 RX ADMIN — AZATHIOPRINE 50 MG: 50 TABLET ORAL at 08:44

## 2019-10-21 RX ADMIN — LABETALOL 20 MG/4 ML (5 MG/ML) INTRAVENOUS SYRINGE 10 MG: at 11:17

## 2019-10-21 RX ADMIN — PANTOPRAZOLE SODIUM 40 MG: 40 TABLET, DELAYED RELEASE ORAL at 05:11

## 2019-10-21 RX ADMIN — INSULIN LISPRO 1 UNITS: 100 INJECTION, SOLUTION INTRAVENOUS; SUBCUTANEOUS at 12:16

## 2019-10-21 RX ADMIN — CEFEPIME HYDROCHLORIDE 1000 MG: 1 INJECTION, POWDER, FOR SOLUTION INTRAMUSCULAR; INTRAVENOUS at 12:16

## 2019-10-21 NOTE — ASSESSMENT & PLAN NOTE
· Status post renal transplant 2001  · Baseline creatinine 4-5  · Creatinine 6 1 today  · IV Lasix infusion switched to Bumex infusion med Nephrology  · Plan for PermCath today and initiation of hemodialysis  · Monitor I&O  · Appreciate Nephrology input  · Continue immunosuppressive therapy

## 2019-10-21 NOTE — ASSESSMENT & PLAN NOTE
Likely non MI elevation in setting of acute pulmonary edema, uncontrolled hypertension and MIKE  Cardiology on board , appreciate input

## 2019-10-21 NOTE — UTILIZATION REVIEW
Notification of Inpatient Admission/Inpatient Authorization Request  This is a Notification of Inpatient Admission/Request for Inpatient Authorization for our facility [facility]  Be advised that this patient was admitted to our facility under Inpatient Status  Please contact the Utilization Review Department where the patient is receiving care services for additional admission information  Facility: [facility] ([facility])  Place of Service Code: 21   Place of Service Name: [address]  Presentation Date & Time: 10/20/2019  2:21 AM  Inpatient Admission Date & Time: 10/20/19 0221  Discharge Date & Time: No discharge date for patient encounter  Discharge Disposition (if discharged): [facility] EMERGENCY [facility]  Attending Physician and NPI#: Cm Ferromason, [address] [[id number]]   Attending Physician:  YINKA Tejeda  Specialty- Internal Medicine  [facility] ID- [id number]  [address]  Phone 1: [phone]  Fax: [phone]    Admission Orders (From admission, onward)     Ordered        10/20/19 0236  Inpatient Admission  Once                   Network Utilization Review Department  Phone: [phone]; Fax [phone]  [email or web address]  ATTENTION: Please call with any questions or concerns to [phone]  and carefully listen to the prompts so that you are directed to the right person  Send all requests for admission clinical reviews, approved or denied determinations and any other requests to fax [phone]   All voicemails are confidential

## 2019-10-21 NOTE — PLAN OF CARE
Problem: Potential for Falls  Goal: Patient will remain free of falls  Description  INTERVENTIONS:  - Assess patient frequently for physical needs  -  Identify cognitive and physical deficits and behaviors that affect risk of falls    -  Bourg fall precautions as indicated by assessment   - Educate patient/family on patient safety including physical limitations  - Instruct patient to call for assistance with activity based on assessment  - Modify environment to reduce risk of injury  - Consider OT/PT consult to assist with strengthening/mobility  Outcome: Progressing     Problem: Prexisting or High Potential for Compromised Skin Integrity  Goal: Skin integrity is maintained or improved  Description  INTERVENTIONS:  - Identify patients at risk for skin breakdown  - Assess and monitor skin integrity  - Assess and monitor nutrition and hydration status  - Monitor labs   - Assess for incontinence   - Turn and reposition patient  - Assist with mobility/ambulation  - Relieve pressure over bony prominences  - Avoid friction and shearing  - Provide appropriate hygiene as needed including keeping skin clean and dry  - Evaluate need for skin moisturizer/barrier cream  - Collaborate with interdisciplinary team   - Patient/family teaching  - Consider wound care consult   Outcome: Progressing     Problem: PAIN - ADULT  Goal: Verbalizes/displays adequate comfort level or baseline comfort level  Description  Interventions:  - Encourage patient to monitor pain and request assistance  - Assess pain using appropriate pain scale  - Administer analgesics based on type and severity of pain and evaluate response  - Implement non-pharmacological measures as appropriate and evaluate response  - Consider cultural and social influences on pain and pain management  - Notify physician/advanced practitioner if interventions unsuccessful or patient reports new pain  Outcome: Progressing     Problem: INFECTION - ADULT  Goal: Absence or prevention of progression during hospitalization  Description  INTERVENTIONS:  - Assess and monitor for signs and symptoms of infection  - Monitor lab/diagnostic results  - Monitor all insertion sites, i e  indwelling lines, tubes, and drains  - Monitor endotracheal if appropriate and nasal secretions for changes in amount and color  - Manor appropriate cooling/warming therapies per order  - Administer medications as ordered  - Instruct and encourage patient and family to use good hand hygiene technique  - Identify and instruct in appropriate isolation precautions for identified infection/condition  Outcome: Progressing     Problem: SAFETY ADULT  Goal: Maintain or return to baseline ADL function  Description  INTERVENTIONS:  -  Assess patient's ability to carry out ADLs; assess patient's baseline for ADL function and identify physical deficits which impact ability to perform ADLs (bathing, care of mouth/teeth, toileting, grooming, dressing, etc )  - Assess/evaluate cause of self-care deficits   - Assess range of motion  - Assess patient's mobility; develop plan if impaired  - Assess patient's need for assistive devices and provide as appropriate  - Encourage maximum independence but intervene and supervise when necessary  - Involve family in performance of ADLs  - Assess for home care needs following discharge   - Consider OT consult to assist with ADL evaluation and planning for discharge  - Provide patient education as appropriate  Outcome: Progressing  Goal: Maintain or return mobility status to optimal level  Description  INTERVENTIONS:  - Assess patient's baseline mobility status (ambulation, transfers, stairs, etc )    - Identify cognitive and physical deficits and behaviors that affect mobility  - Identify mobility aids required to assist with transfers and/or ambulation (gait belt, sit-to-stand, lift, walker, cane, etc )  - Manor fall precautions as indicated by assessment  - Record patient progress and toleration of activity level on Mobility SBAR; progress patient to next Phase/Stage  - Instruct patient to call for assistance with activity based on assessment  - Consider rehabilitation consult to assist with strengthening/weightbearing, etc   Outcome: Progressing     Problem: DISCHARGE PLANNING  Goal: Discharge to home or other facility with appropriate resources  Description  INTERVENTIONS:  - Identify barriers to discharge w/patient and caregiver  - Arrange for needed discharge resources and transportation as appropriate  - Identify discharge learning needs (meds, wound care, etc )  - Arrange for interpretive services to assist at discharge as needed  - Refer to Case Management Department for coordinating discharge planning if the patient needs post-hospital services based on physician/advanced practitioner order or complex needs related to functional status, cognitive ability, or social support system  Outcome: Progressing     Problem: Knowledge Deficit  Goal: Patient/family/caregiver demonstrates understanding of disease process, treatment plan, medications, and discharge instructions  Description  Complete learning assessment and assess knowledge base    Interventions:  - Provide teaching at level of understanding  - Provide teaching via preferred learning methods  Outcome: Progressing     Problem: CARDIOVASCULAR - ADULT  Goal: Maintains optimal cardiac output and hemodynamic stability  Description  INTERVENTIONS:  - Monitor I/O, vital signs and rhythm  - Monitor for S/S and trends of decreased cardiac output  - Administer and titrate ordered vasoactive medications to optimize hemodynamic stability  - Assess quality of pulses, skin color and temperature  - Assess for signs of decreased coronary artery perfusion  - Instruct patient to report change in severity of symptoms  Outcome: Progressing  Goal: Absence of cardiac dysrhythmias or at baseline rhythm  Description  INTERVENTIONS:  - Continuous cardiac monitoring, vital signs, obtain 12 lead EKG if ordered  - Administer antiarrhythmic and heart rate control medications as ordered  - Monitor electrolytes and administer replacement therapy as ordered  Outcome: Progressing     Problem: RESPIRATORY - ADULT  Goal: Achieves optimal ventilation and oxygenation  Description  INTERVENTIONS:  - Assess for changes in respiratory status  - Assess for changes in mentation and behavior  - Position to facilitate oxygenation and minimize respiratory effort  - Oxygen administered by appropriate delivery if ordered  - Initiate smoking cessation education as indicated  - Encourage broncho-pulmonary hygiene including cough, deep breathe, Incentive Spirometry  - Assess the need for suctioning and aspirate as needed  - Assess and instruct to report SOB or any respiratory difficulty  - Respiratory Therapy support as indicated  Outcome: Progressing     Problem: METABOLIC, FLUID AND ELECTROLYTES - ADULT  Goal: Electrolytes maintained within normal limits  Description  INTERVENTIONS:  - Monitor labs and assess patient for signs and symptoms of electrolyte imbalances  - Administer electrolyte replacement as ordered  - Monitor response to electrolyte replacements, including repeat lab results as appropriate  - Instruct patient on fluid and nutrition as appropriate  Outcome: Progressing  Goal: Fluid balance maintained  Description  INTERVENTIONS:  - Monitor labs   - Monitor I/O and WT  - Instruct patient on fluid and nutrition as appropriate  - Assess for signs & symptoms of volume excess or deficit  Outcome: Progressing  Goal: Glucose maintained within target range  Description  INTERVENTIONS:  - Monitor Blood Glucose as ordered  - Assess for signs and symptoms of hyperglycemia and hypoglycemia  - Administer ordered medications to maintain glucose within target range  - Assess nutritional intake and initiate nutrition service referral as needed  Outcome: Progressing

## 2019-10-21 NOTE — RESULT ENCOUNTER NOTE
Please call the patient regarding his abnormal result    GFR is 11 creatinine is 5 6 to BUN is 116 patient should consider hemodialysis at home he would feel better also hemoglobin is 8 4 which is stable but cyst he has the anemia of chronic kidney disease and he should be set up with the infusion center Hematology so that he can get Braulio bow peau eaten injections to stimulate his bone marrow to make more blood

## 2019-10-21 NOTE — PROGRESS NOTES
Vancomycin IV Pharmacy-to-Dose Consultation    Dot Oar is a 51 y o  male who is currently receiving Vancomycin IV with management by the Pharmacy Consult service for the treatment of Pneumonia    Assessment/Plan:    The patient's chart was reviewed  Renal function continues to decline, tentative plan to start dialysis tomorrow  The following nephrotoxicity factors are present:  Medications: tacrolimus, bumetanide gtt  Patient-Factors: renal dysfunction    Based on today's assessment, will continue current vancomycin (Day # 2) dosing of 750 mg q24h PRN level < 20, with a plan for random level to be drawn at 0600 on 10/22  Will adjust vancomycin dose when dialysis is initiated  We will continue to follow the patient's culture results and clinical progress daily      [employer], Pharmacist

## 2019-10-21 NOTE — ASSESSMENT & PLAN NOTE
· Presented with hypertensive  emergency, now better controlled  · Continue with Bumex infusion  · Continue with Toprol-XL 50 mg daily, Procardia XL 90 mg daily, hydralazine 50 mg q 8 hours  · P r n  Labetalol ordered  · Off nitroglycerin drip  · Appreciate Nephrology input

## 2019-10-21 NOTE — PROGRESS NOTES
Progress Note - Oswaldo Bagley 1970, 51 y o  male MRN: [id number]    Unit/Bed#: [facility] 529-01 Encounter: [id number]    Primary Care Provider: Zaira Nava DO   Date and time admitted to hospital: 10/20/2019  2:21 AM        * Acute respiratory failure with hypoxia [facility])  Assessment & Plan  · Likely secondary to acute pulmonary edema in setting of hypertensive emergency and Jamshid on CKD  · Initially required BiPAP and ICU admission  · Currently on nasal cannula  · Was also started on broad-spectrum antibiotics given high procalcitonin which can be falls in setting of acute kidney injury on CKD  · Continue Bumex drip per Nephrology and Cardiology  · Plan for eventual dialysis  PermCath likely today  · O2 supplementation as needed to maintain O2 sat above 89%  · Infectious workup pending, follow blood cultures and MRSA swab  · Consider rapid discontinuation of antibiotics if infectious workup negative and if patient is afebrile  · Trend procalcitonin in a m  Thelda Barake Thelda Far Acute kidney injury superimposed on CKD [facility])  Assessment & Plan  · Status post renal transplant 2001  · Baseline creatinine 4-5  · Creatinine 6 1 today  · IV Lasix infusion switched to Bumex infusion med Nephrology  · Plan for PermCath today and initiation of hemodialysis  · Monitor I&O  · Appreciate Nephrology input  · Continue immunosuppressive therapy  Acute pulmonary edema (HCC)  Assessment & Plan  · In setting of hypertensive emergency  · Currently on Bumex infusion  · Plan for hemodialysis  · Echo on 10/11 showed EF of 52% with hypokinesis, moderate to severe pulmonary hypertension and possible small VSD  · Cardiology on board  Appreciate input  · I&O and daily weight    · O2 supplementation as needed to maintain O2 sat >89%    Hx of right BKA (HCC)  Assessment & Plan  · Known history of  · Supportive care    Chronic anemia  Assessment & Plan  · Baseline hemoglobin between 7-8  · Currently at baseline  · Trend CBC  · Has history of GI bleed  Elevated troponin  Assessment & Plan  Likely non MI elevation in setting of acute pulmonary edema, uncontrolled hypertension and MIKE  Cardiology on board , appreciate input  Hypertension  Assessment & Plan  · Presented with hypertensive  emergency, now better controlled  · Continue with Bumex infusion  · Continue with Toprol-XL 50 mg daily, Procardia XL 90 mg daily, hydralazine 50 mg q 8 hours  · P r n  Labetalol ordered  · Off nitroglycerin drip  · Appreciate Nephrology input  Type 1 diabetes mellitus [facility])  Assessment & Plan  Lab Results   Component Value Date    HGBA1C 4 8 10/11/2019       Recent Labs     10/20/19  2040 10/21/19  0544 10/21/19  0918 10/21/19  1127   POCGLU 94 193* 259* 195*       Blood Sugar Average: Last 72 hrs:  (P) 370 [id number]     Endocrinology on board  Continue with basal bolus regimen  Seven units Lantus at bedtime with 3 units t i d  Lispro for mealtime coverage  Appreciate Endocrinology input  Insulin sliding scale and Accu-Cheks  VTE Pharmacologic Prophylaxis:   Pharmacologic: Heparin  Mechanical VTE Prophylaxis in Place: Yes    Patient Centered Rounds: I have performed bedside rounds with nursing staff today  Discussions with Specialists or Other Care Team Provider:  Nephrology,     Education and Discussions with Family / Patient:  Discussed plan of care with the patient    Time Spent for Care: 30 minutes  More than 50% of total time spent on counseling and coordination of care as described above  Current Length of Stay: 1 day(s)    Current Patient Status: Inpatient   Certification Statement: The patient will continue to require additional inpatient hospital stay due to Not medically stable    Discharge Plan:  When medically stable    Code Status: Level 1 - Full Code      Subjective:   Patient is comfortable this morning without any chest pain, palpitations or worsening dyspnea    Sitting in his bed appears comfortable on nasal cannula  Denies any abdominal pain, diarrhea  No fever or chills  Objective:     Vitals:   Temp (24hrs), Av 4 °F (36 9 °C), Min:98 °F (36 7 °C), Max:98 6 °F (37 °C)    Temp:  [98 °F (36 7 °C)-98 6 °F (37 °C)] 98 6 °F (37 °C)  HR:  [77-91] 77  Resp:  [12-20] 18  BP: (110-232)/(57-98) 115/58  SpO2:  [95 %-100 %] 98 %  Body mass index is 20 55 kg/m²  Input and Output Summary (last 24 hours): Intake/Output Summary (Last 24 hours) at 10/21/2019 1636  Last data filed at 10/21/2019 1116  Gross per 24 hour   Intake 322 ml   Output 1700 ml   Net -1378 ml       Physical Exam:     Physical Exam   Constitutional: No distress  Eyes: Pupils are equal, round, and reactive to light  Cardiovascular: Normal rate, regular rhythm and normal heart sounds  No murmur heard  Pulmonary/Chest: Effort normal  No respiratory distress  He has no wheezes  He has rales  Abdominal: Soft  Bowel sounds are normal  He exhibits no distension  There is no tenderness  Musculoskeletal: He exhibits edema  Right BKA noted   Neurological: He is alert  Awake and communicative     Skin: Skin is warm           Additional Data:     Labs:    Results from last 7 days   Lab Units 10/21/19  0511   WBC Thousand/uL 10 00   HEMOGLOBIN g/dL 7 6*   HEMATOCRIT % 23 9*   PLATELETS Thousands/uL 281   NEUTROS PCT % 64   LYMPHS PCT % 22   MONOS PCT % 9   EOS PCT % 3     Results from last 7 days   Lab Units 10/21/19  0511 10/20/19  0409   SODIUM mmol/L 137 139   POTASSIUM mmol/L 4 2 4 3   CHLORIDE mmol/L 99* 99*   CO2 mmol/L 28 31   BUN mg/dL 108* 123*   CREATININE mg/dL 6 01* 5 99*   ANION GAP mmol/L 10 9   CALCIUM mg/dL 9 1 10 1   ALBUMIN g/dL  --  2 8*   TOTAL BILIRUBIN mg/dL  --  0 53   ALK PHOS U/L  --  157*   ALT U/L  --  42   AST U/L  --  38   GLUCOSE RANDOM mg/dL 163* 339*     Results from last 7 days   Lab Units 10/20/19  0419   INR  1 09     Results from last 7 days   Lab Units 10/21/19  1127 10/21/19  5101 10/21/19  0544 10/20/19  2040 10/20/19  1612 10/20/19  1332 10/20/19  1216 10/20/19  1215 10/20/19  1158 10/20/19  0800 10/20/19  0611 10/20/19  0412   POC GLUCOSE mg/dl 195* 259* 193* 94 171* 115 60* 47* 57* 204* 358* 416*         Results from last 7 days   Lab Units 10/21/19  0511 10/20/19  0419 10/20/19  0409 10/20/19  0115 10/19/19  2300   LACTIC ACID mmol/L  --  1 3  --   --  2 8*   PROCALCITONIN ng/ml 0 51*  --  0 33* 0 39*  --            * I Have Reviewed All Lab Data Listed Above  * Additional Pertinent Lab Tests Reviewed:  All Labs Within Last 24 Hours Reviewed    Imaging:    IR permacath placement   Final Result by Alexandr Cruz MD (10/21 1501)   Impression: Successful image guided placement of tunneled central venous hemodialysis catheter         Workstation performed: [id number]             Recent Cultures (last 7 days):           Last 24 Hours Medication List:     Current Facility-Administered Medications:  albuterol 2 5 mg Nebulization Q4H PRN Isabela Arevalo MD    aspirin 81 mg Oral Daily uSpa Nicholson PA-C    atorvastatin 80 mg Oral Daily With [facility] EMANUEL Mayfield    azaTHIOprine 50 mg Oral Daily Supa Nicholson PA-C    bumetanide 1 mg/hr Intravenous Continuous Iveth Greene MD Last Rate: 1 mg/hr (10/21/19 1146)   cefepime 1,000 mg Intravenous Q12H Supa Nicholson PA-C Last Rate: 1,000 mg (10/21/19 1216)   docusate sodium 100 mg Oral Daily Rosalva Taylor PA-C    heparin (porcine) 5,000 Units Subcutaneous Q8H [address] Rosalva Taylor PA-C    hydrALAZINE 50 mg Oral Q8H [address] Brien Cali DO    insulin detemir 7 Units Subcutaneous Early Morning Isabela Arevalo MD    insulin lispro 1-5 Units Subcutaneous HS Isabela Arevalo MD    insulin lispro 1-5 Units Subcutaneous TID AC Sona Mojica MD    insulin lispro 3 Units Subcutaneous TID With Meals Sona Mojica MD    Labetalol HCl 10 mg Intravenous Q6H PRN Del Bao Taylor PA-C    metoprolol succinate 50 mg Oral Daily Deniz Lazo PA-C    metroNIDAZOLE 500 mg Intravenous Q8H Deniz Lazo PA-C Last Rate: 500 mg (10/21/19 0921)   [START ON 10/22/2019] NIFEdipine ER 90 mg Oral Daily Linsey Ramachandran MD    nitroGLYcerin 5-200 mcg/min Intravenous Titrated Deniz Lazo PA-C Last Rate: Stopped (10/20/19 1330)   pantoprazole 40 mg Oral Early Morning Rosalva Taylor PA-C    predniSONE 5 mg Oral Daily Deniz Lazo PA-C    senna 1 tablet Oral HS Deniz Lazo PA-C    sevelamer 800 mg Oral TID With Meals Deniz Lazo PA-C    tacrolimus 2 mg Oral HS Rosalva Taylor PA-C    tacrolimus 3 mg Oral QAM Rosalva Taylor PA-C    vancomycin 15 mg/kg Intravenous Daily PRN Deniz Lazo PA-C         Today, Patient Was Seen By: Tra Pina MD    ** Please Note: Dictation voice to text software may have been used in the creation of this document   **

## 2019-10-21 NOTE — ASSESSMENT & PLAN NOTE
· In setting of hypertensive emergency  · Currently on Bumex infusion  · Plan for hemodialysis  · Echo on 10/11 showed EF of 52% with hypokinesis, moderate to severe pulmonary hypertension and possible small VSD  · Cardiology on board  Appreciate input  · I&O and daily weight    · O2 supplementation as needed to maintain O2 sat >89%

## 2019-10-21 NOTE — CONSULTS
Reason for Consult / Principal Problem:  Acute congestive heart failure    Physician Requesting Consult:  Edd Lee MD    Cardiologist: Estrada Calderon MD        Assessment and Plan      Current Problem List   Principal Problem:    Acute respiratory failure ([facility] )  Active Problems:    Type 1 diabetes mellitus ([facility] )    Renal transplant, status post    Hypertension    Non-ST elevation myocardial infarction (NSTEMI), type 2    Stage 5 chronic kidney disease not on chronic dialysis ([facility] )    Depressed left ventricular ejection fraction    Acute CHF (congestive heart failure) ([facility] )    Assessment/Plan:    1  Acute Congestive Heart Failure-Two similar events in the last few weeks, most likely eiology hypertensive urgency likely increased afterload resulting in flash pulmonary edema with possible pneumonia presentation with increased procalcitonin, x-ray findings, fluid overload state in the setting of s/p renal transplant and chronic kidney disease stage 5  · Cardiorenal syndrome type 3/4  · Currently on cefepime, vancomycin, Flagyl broad-spectrum  · on hydralazine 25 Q 8, Toprol XL 50 qday, will increase nifedipine 90 q day  · D/c Lasix gtt, start Bumex gtt  · Goal SBP less than 140  2  History CAD  · S/P stents x2 February 2017 at [facility]  · Consider follow-up testing outpatient  3  Renal transplant, s/p 2001  · Nephrology on case, on tacrolimus and Prograf  · Stage 5 chronic kidney disease not currently on dialysis  · Possible Port-A-Cath today  4  NSTEMI, type 2   · Most likely supply/demand ischemia in the setting of acute congestive heart failure, current asymptomatic, Troponins elevated, 0 05, 0 11, 0 11, peaked, no chest pain or palpitations  5  Hypertension  · Hypertension urgency on admission, continue above medications for blood pressure control  · Pending secondary hypertension workup, aldosterone, metanephrine, renin lab values  6   Type 1 diabetes mellitus  ·  Continue insulin regimen per primary team    Subjective     CC:  Shortness of breath, cardiac consult    HPI: Susana Mathis 51y o  year old male who presents with CKD status post renal transplant 2001 on tacrolimus and prograf, diabetes mellitus type 1, hypertension, s/p above the knee amputation right side presented with sudden onset of shortness of breath 10/19  Patient notes Friday night all of a sudden started to feel shortness of breath, checked his pulse ox which went down to 79% and called EMS and was transported to hospital  Patient required continuous BiPAP with nitroglycerin infusion  Recent admission 10/11/-10/15/19 for similar symptoms, respiratory distress secondary to pulmonary edema/hypertensive urgency  At time of encounter this morning denies chest pain, shortness of breath, palpitations, orthopnea, extremity edema, syncope, presyncope, diaphoresis, nausea/vomiting  Telemetry:  Currently in sinus rhythm, no overnight events    Net fluid balance:  -624 mL, output yesterday 1 6 L    EKG:  Normal sinus rhythm, on telemetry    ECHO:  10/11/2019, EF 52%, mild hypokinesis of basal inferior wall, moderate hypokinesis of basal mid inferior septal wall, mild concentric hypertrophy, small VSD in apical septum, left atrium mildly dilated, moderate mitral regurg, moderate tricuspid valve regurgitation with peak PA pressure 61, suggestive of moderate to severe pulmonary hypertension, trace pericardial effusion    12/20/18, EF 65%, no regional wall motion abnormalities    Cardiac Catheterization:  Per patient, two stents placed February 2017 at [address]: 10/19/19: Dense right greater than left bilateral perihilar airspace opacities suspicious for bilateral pneumonia superimposed upon a background of probable pulmonary vascular congestive change      Labs:  Elevated troponins, 0 05, 0 11, 0 11, elevated procalcitonin, electrolytes wnl, admission BNP 49k+    Home cardiac medications:  Aspirin 81 q day, atorvastatin 80 mg q day, metoprolol succinate 25 mg 24 hour tablet q day, nifedipine 30 mg 24 hour tablet q day, torsemide 20 q day    Renal transplant; azathioprine 50 q day, tacrolimus 1 mg 3/2    Social history:  Denies alcohol, smoking, illicit drug history  Admits to being compliant with home medications, following diabetic diet  Family History: Mother coronary artery disease    Historical Information   Past Medical History:   Diagnosis Date    Bacteremia 12/21/2018    Cardiac arrest ([facility] )     Diabetes mellitus ([place] [address] )     Diabetes mellitus type 1 ([place] [address] )     GI bleed     Hypertension     Infection at site of external fixator pin ([facility] )     MI (myocardial infarction) ([place] [address] )     Pneumonia     Last Assessed [id number]    Renal failure     Renal transplant, status post 07/21/2007     Past Surgical History:   Procedure Laterality Date    ANKLE FRACTURE SURGERY      ANKLE HARDWARE REMOVAL Right 7/31/2017    Procedure: REMOVAL HARDWARE ANKLE;  Surgeon: Milton Johnson MD;  Location: [facility];  Service: Orthopedics    ANKLE HARDWARE REMOVAL Right 8/17/2017    Procedure: TIBIA FAILED HARDWARE REMOVAL;  Surgeon: Milton Johnson MD;  [facility]: [facility];  Service: Orthopedics    CARDIAC SURGERY      2 stents    CLOSED REDUCTION ANKLE Right 7/3/2017    Procedure: CLOSED REDUCTION DISTAL TIB-FIB AND CASTING VS;  Surgeon: Milton Johnson MD;  [facility]: [facility];  Service: Orthopedics    ESOPHAGOGASTRODUODENOSCOPY N/A 12/20/2018    Procedure: ESOPHAGOGASTRODUODENOSCOPY (EGD) in ICU; Surgeon: Sagar Monahan MD;  Location: [facility];   Service: Gastroenterology    EYE SURGERY      FRACTURE SURGERY      ORIF Rt Ankle    GLUTAMIC ACID DECARBOXYLASE (HISTORICAL)      IR PICC LINE  8/20/2018    IR VENOUS LINE REMOVAL  10/5/2018    LEG AMPUTATION Right 02/01/2019    Above the knee    NEPHRECTOMY TRANSPLANTED ORGAN      VT OPEN TREATMENT FRACTURE DISTAL TIBIA FIBULA Right 7/3/2017    Procedure: OPEN REDUCTION W/ INTERNAL FIXATION (ORIF); Surgeon: Yoshi Yoon MD;  [facility]: [facility];  Service: Orthopedics    TOE AMPUTATION Right 10/27/2016    Procedure: AMPUTATION TOE;  Surgeon: Joy Kaufman DPM;  Location: [facility];  Service:      Social History   Social History     Substance and Sexual Activity   Alcohol Use Not Currently     Social History     Substance and Sexual Activity   Drug Use Never     Social History     Tobacco Use   Smoking Status Never Smoker   Smokeless Tobacco Never Used     Family History:   Family History   Problem Relation Age of Onset    Diabetes Brother     Coronary artery disease Mother        Review of Systems:  Review of Systems   Constitutional: Negative for chills, diaphoresis and fever  HENT: Negative  Eyes: Negative  Respiratory: Negative for shortness of breath and wheezing  Cardiovascular: Negative for chest pain, palpitations and leg swelling  Gastrointestinal: Negative for abdominal distention, abdominal pain, constipation, diarrhea, nausea and vomiting  Endocrine: Negative  Genitourinary: Positive for frequency  Negative for difficulty urinating, dysuria and hematuria  Musculoskeletal: Negative for back pain and neck pain  Neurological: Negative for dizziness, syncope, light-headedness, numbness and headaches  Psychiatric/Behavioral: Negative for agitation, behavioral problems and confusion             Scheduled Meds:  Current Facility-Administered Medications:  albuterol 2 5 mg Nebulization Q4H PRN Khris Handley MD    aspirin 81 mg Oral Daily Efraín Delarosa PA-C    atorvastatin 80 mg Oral Daily With [employer] [facility]    azaTHIOprine 50 mg Oral Daily Rosalva Taylor PA-C    cefepime 1,000 mg Intravenous Q12H Efraín Delarosa PA-C Last Rate: Stopped (10/21/19 0052)   docusate sodium 100 mg Oral Daily Rosalva Taylor PA-C    furosemide 10 mg/hr Intravenous Continuous Efraín Delarosa PA-C Last Rate: 10 mg/hr (10/21/19 1031)   heparin (porcine) 5,000 Units Subcutaneous Q8H [address] Rosalva Taylor PA-C    hydrALAZINE 25 mg Oral Q8H [address] Rosalva Taylor PA-C    insulin detemir 7 Units Subcutaneous Early Morning Aubree Lynch MD    insulin lispro 1-5 Units Subcutaneous HS Aubree Lynch MD    insulin lispro 1-5 Units Subcutaneous TID AC Travis Xiong MD    insulin lispro 3 Units Subcutaneous TID With Meals Sergey Ghosh MD    Labetalol HCl 10 mg Intravenous Q6H PRN Edelmira Perdomo PA-C    metoprolol succinate 50 mg Oral Daily Edelmira Perdomo PA-C    metroNIDAZOLE 500 mg Intravenous Q8H Edelmira Perdomo PA-C Last Rate: Stopped (10/21/19 0140)   NIFEdipine ER 60 mg Oral Daily Rosalva Taylor PA-C    nitroGLYcerin 5-200 mcg/min Intravenous Titrated Edelmira Perdomo PA-C Last Rate: Stopped (10/20/19 133)   pantoprazole 40 mg Oral Early Morning Rosalva Taylor PA-C    predniSONE 5 mg Oral Daily Edelmira Perdomo PA-C    senna 1 tablet Oral HS Edelmira Perdomo PA-C    sevelamer 800 mg Oral TID With Meals Edelmira Perdomo PA-C    tacrolimus 2 mg Oral HS Rosalva Taylor PA-C    tacrolimus 3 mg Oral QAM Rosalva Taylor PA-C    vancomycin 15 mg/kg Intravenous Daily PRN Edelmira Perdomo PA-C      Continuous Infusions:  furosemide 10 mg/hr Last Rate: 10 mg/hr (10/21/19 0627)   nitroGLYcerin 5-200 mcg/min Last Rate: Stopped (10/20/19 1330)     PRN Meds:   albuterol    Labetalol HCl    vancomycin  all current active meds have been reviewed    No Known Allergies    Objective   Vitals: Temp (24hrs), Av 2 °F (36 8 °C), Min:97 6 °F (36 4 °C), Max:98 6 °F (37 °C)  Current: Temperature: 98 1 °F (36 7 °C)  Patient Vitals for the past 24 hrs:   BP Temp Temp src Pulse Resp SpO2   10/21/19 0707 (!) 193/92 98 1 °F (36 7 °C) Oral 85 14 99 %   10/21/19 0300 (!) 179/84   80     10/21/19 0228 (!) 188/82 98 6 °F (37 °C)  80 18 100 %   10/20/19 2334 (!) 178/79 98 5 °F (36 9 °C)  83 18 98 %   10/20/19 2040 148/67   83 20 95 %   10/20/19 1955      99 %   10/20/19 1941 157/75 98 °F (36 7 °C) Oral 82 12 99 %   10/20/19 1500 112/59 97 6 °F (36 4 °C) Oral 81 17 94 %   10/20/19 1433 106/78        10/20/19 1345 102/58   82 19 91 %   10/20/19 1330 (!) 102/47   78 12 90 %   10/20/19 1300 109/50   84 (!) 10 90 %   10/20/19 1200 109/52 98 3 °F (36 8 °C) Oral 78 17 90 %   10/20/19 1000 142/65   86 20 91 %   10/20/19 0900 152/68   82 18 93 %    Body mass index is 20 55 kg/m²    Orthostatic Blood Pressures      Most Recent Value   Blood Pressure  (!) 193/92 filed at 10/21/2019 0707   Patient Position - Orthostatic VS  Sitting filed at 10/20/2019 2040              Invasive Devices     Peripheral Intravenous Line            Peripheral IV 10/20/19 Left Hand 1 day    Peripheral IV 10/20/19 Right Forearm less than 1 day                Physical Exam:    General:  AO x3, no acute distress  Cardiac:  S1-S2 normal  No murmurs, rubs or gallops, JVD present  Lungs:  Diminished breath sounds with positive basilar base crackles bilaterally  Abdomen:  Soft nontender nondistended, positive bowel sounds  Extremities:  Warm, well perfused, pulses palpable, no ulcers or rashes, no pedal edema, above the knee amputation right lower extremity  Neuro: Grossly nonfocal  Psych:  Normal affect      Lab Results:   Results from last 7 days   Lab Units 10/21/19  0511 10/20/19  1628 10/20/19  0419 10/19/19  2300 10/18/19  0805 10/15/19  0528   WBC Thousand/uL 10 00  --  10 90* 15 39* 11 94* 8 19   HEMOGLOBIN g/dL 7 6* 7 8* 7 3* 9 6* 8 4* 8 5*   HEMATOCRIT % 23 9* 24 0* 22 8* 29 9* 26 8* 26 7*   PLATELETS Thousands/uL 281  --  299 472* 318 320   NEUTROS PCT % 64  --  74 69 68 57   MONOS PCT % 9  --  10 8 8 8    Results from last 7 days   Lab Units 10/21/19  0511 10/20/19  1002 10/20/19  0419 10/20/19  0409 10/19/19  2300 10/18/19  0805 10/15/19  0528   SODIUM mmol/L 137  --   --  139 137 142 135*   POTASSIUM mmol/L 4 2  --   --  4 3 3 4* 4 6 4 2   CHLORIDE mmol/L 99*  --   -- 99* 111* 102 99*   CO2 mmol/L 28  --   --  31 18* 29 24   BUN mg/dL 108*  --   --  123* 78* 116* 121*   CREATININE mg/dL 6 01*  --   --  5 99* 3 44* 5 62* 5 49*   CALCIUM mg/dL 9 1  --   --  10 1 5 4* 9 0 8 2*   ALK PHOS U/L  --   --   --  157* 131*  --   --    ALT U/L  --   --   --  42 26  --   --    AST U/L  --   --   --  38 55*  --   --    TROPONIN I ng/mL  --  0 11* 0 11*  --  0 05*  --   --    MAGNESIUM mg/dL  --   --   --  2 3 2 3  --   --    PHOSPHORUS mg/dL  --   --   --  7 1*  --   --   --    INR   --   --  1 09  --  0 93  --   --    PTT seconds  --   --  30  --  31  --   --    EGFR ml/min/1 73sq m 10  --   --  10 20 11 11     Results from last 7 days   Lab Units 10/20/19  0419 10/19/19  2300   INR  1 09 0 93   PTT seconds 30 31     Results from last 7 days   Lab Units 10/20/19  0419   LACTIC ACID mmol/L 1 3     Results from last 7 days   Lab Units 10/20/19  1002 10/20/19  0419 10/19/19  2300   TROPONIN I ng/mL 0 11* 0 11* 0 05*     No results found for: PHART, YMJ5QAB, PO2ART, YIW6DZP, V1ZBBAYG, BEART, SOURCE  No components found for: HIV1X2  Lab Results   Component Value Date    HEPAIGM Non-Reactive (q 12/07/2014    HEPBIGM Non-Reactive (q 12/07/2014    HEPCAB Non-reactive 08/03/2017     No results found for: SPEP, UPEP Lab Results   Component Value Date    HGBA1C 4 8 10/11/2019    HGBA1C 6 3 06/29/2019    HGBA1C 7 0 (H) 09/24/2018     Lab Results   Component Value Date    CHOL 105 08/18/2015    Lab Results   Component Value Date    HDL 37 (L) 02/15/2019    HDL 41 06/23/2018    HDL 20 (L) 09/24/2017    Lab Results   Component Value Date    LDLCALC 41 02/15/2019    LDLCALC 41 06/23/2018    LDLCALC 25 09/24/2017    Lab Results   Component Value Date    TRIG 86 02/15/2019    TRIG 120 06/23/2018    TRIG 80 09/24/2017     No components found for: PROCAL          Imaging: I have personally reviewed pertinent reports          DO Mohini Ellis [facility] Internal Medicine PGY-1

## 2019-10-21 NOTE — PROCEDURES
Central Line Insertion  Date/Time: 10/21/2019 2:36 PM  Performed by: Miguel Collins MD  Authorized by: Miguel Collins MD     Patient location:  [facility] protocol:     Procedure explained and questions answered to patient or proxy's satisfaction: yes      Relevant documents present and verified: yes      Immediately prior to procedure, a time out was called: yes      Patient identity confirmed:  Verbally with patient, arm band and provided demographic data  Pre-procedure details:     Hand hygiene: Hand hygiene performed prior to insertion      Sterile barrier technique: All elements of maximal sterile technique followed      Skin preparation:  ChloraPrep    Skin preparation agent: Skin preparation agent completely dried prior to procedure    Indications:     Central line indications: dialysis    Sedation:     Sedation type: Moderate (conscious) sedation  Anesthesia (see MAR for exact dosages): Anesthesia method:  Local infiltration    Local anesthetic:  Lidocaine 1% WITH epi  Procedure details:     Location:  Right internal jugular    Vessel type: vein      Approach: percutaneous technique used      Catheter type:  Double lumen    Catheter size:  14 Fr    Ultrasound guidance: yes      Sterile ultrasound techniques: Sterile gel and sterile probe covers were used      Successful placement: yes      Vessel of catheter tip end:  RA  Post-procedure details:     Post-procedure:  Dressing applied and line sutured    Assessment:  Blood return through all ports    Post-procedure complications: none      Patient tolerance of procedure:   Tolerated well, no immediate complications

## 2019-10-21 NOTE — DISCHARGE INSTRUCTIONS
Perma-cath Placement   WHAT YOU NEED TO KNOW:   A perma-cath is a catheter placed through a vein into or near your right atrium  Your right atrium is the right upper chamber of your heart  A perma-cath is used for dialysis in an emergency or until a long-term device is ready to use  After your procedure, you will have some pain and swelling on your chest and neck  You may have some bruises on your chest and neck  You may also have 2 dressings, one on your chest and one on your neck  DISCHARGE INSTRUCTIONS:   Call 911 for any of the following:   · You feel lightheaded, short of breath, and have chest pain  · Your catheter comes out   Contact Interventional Radiology at [phone] [place] PATIENTS: Contact Interventional Radiology at [phone]) Carolann Mota PATIENTS: Contact Interventional Radiology at [phone]) if:  · Blood soaks through your bandage  · You have new swelling in your arm, neck, face, or chest on your right side  · Your catheter gets wet  · Your bruises or pain get worse  · You have a fever or chills  · Persistent nausea or vomiting  · Your incision is red, swollen, or draining pus  · You have questions or concerns about your condition or care  Self-care:       · Resume your normal diet  · Keep your dressings dry  Do not take a shower or swim  You may take a tub bath, but do not get your dressings wet  Water in your wound can cause bacteria to grow and cause an infection  If your dressing gets wet, dry it off and cover it with dry sterile gauze  Call your healthcare provider  Do not use soaps or ointments  · Do not change your dressings  Your healthcare provider or dialysis nurse will change your dressings  Your dressings should stay in place until your healthcare provider removes them  The dressing on your chest will stay as long as you have the catheter in place  The dressing prevents infection  · Do not remove the red and blue caps from the end of your catheter   The caps prevent air from getting into your catheter    Follow up with your healthcare provider as directed: Write down your questions so you remember to ask them during your visits

## 2019-10-21 NOTE — PROGRESS NOTES
Patient c/o feeling "strange " BP stable, BS94, patient given rice crispies per his request since he will be NPO at midnight

## 2019-10-21 NOTE — UTILIZATION REVIEW
Initial Clinical Review    Admission: Date/Time/Statement: Inpatient Admission Orders (From admission, onward)     Ordered        10/20/19 0236  Inpatient Admission  Once                   Orders Placed This Encounter   Procedures    Inpatient Admission     Standing Status:   Standing     Number of Occurrences:   1     Order Specific Question:   Admitting Physician     Answer:   Rita Johnson [[id number]]     Order Specific Question:   Level of Care     Answer:   Critical Care [15]     Order Specific Question:   Estimated length of stay     Answer:   More than 2 Midnights     Order Specific Question:   Certification     Answer:   I certify that inpatient services are medically necessary for this patient for a duration of greater than two midnights  See H&P and MD Progress Notes for additional information about the patient's course of treatment  Assessment/Plan: 50 y/o male transferred from [facility] ED presents with respiratory distress and requirement for continuous bipap  Initially presented to [facility] ED with sudden onset SOB, placed on bipap and nitro gtt  Recent hospitalization with discharge 10/15 with similar symptoms  Hx CKD s/p renal transplant, CHF  On exam, decreased breath sounds with rales, remains on bipap  Admitted as inpatient to Critical Care due to acute hypoxic respiratory failure likely secondary to CHF exacerbation, acute pulmonary edema from volume overload, elevated troponin likely from demand, hypertensive emergency, suspected pneumonia  Continue nitro gtt  IV Lasix gtt  Trend troponins  Continue bipap  Continue IV antibiotics  Blood cx pending  Start insulin gtt for glycemic control  PRN IV labetolol  10/20 Nephrology consult:  MIKE on CKD most likely secondary to renal venous congestion and likely progression to ESRD  Baseline Cr 4 5  Will start renal replacement therapy if no significant diuresis  Will require permanent dialysis after discharge    NPO after MN for HD cath placement  10/20 Endocrinology consult:  Recent labile diabetes control with multiple hospitalizations, steroid use, changes in volume status and kidney function  Insulin regimen adjustment and continued glucose monitoring  10/21 Cardiology consult:  Decompensated HF, reduced EF  Cardio renal syndrome, fluid overloaded  D/c lasix gtt  Start bumex gtt  Continue tele  On exam, decreased breath sounds with bibasilar crackles      ED Triage Vitals   Temperature Pulse Respirations Blood Pressure SpO2   10/20/19 0248 10/20/19 0238 10/20/19 0238 10/20/19 0248 10/20/19 0238   98 5 °F (36 9 °C) 85 (!) 32 (!) 187/91 100 %      Temp Source Heart Rate Source Patient Position - Orthostatic VS BP Location FiO2 (%)   10/20/19 0248 10/20/19 0248 10/20/19 0248 10/20/19 0248 10/20/19 0248   Rectal Monitor Lying Right arm 60      Pain Score       10/20/19 0248       No Pain        Wt Readings from Last 1 Encounters:   10/20/19 54 3 kg (119 lb 11 4 oz)     Additional Vital Signs:   10/21/19 0707 98 1 °F (36 7 °C) 85 14 193/92Abnormal  99 %    10/21/19 0300  80  179/84Abnormal   3L   10/21/19 0228 98 6 °F (37 °C) 80 18 188/82Abnormal  100 %    10/20/19 2334 98 5 °F (36 9 °C) 83 18 178/79Abnormal  98 %    10/20/19 2040  83 20 148/67 95 % Nasal cannula 3L   10/20/19 1941 98 °F (36 7 °C) 82 12 157/75 99 % Nasal cannula   10/20/19 1500 97 6 °F (36 4 °C) 81 17 112/59 94 % Nasal cannula 3L   10/20/19 1433    106/78     10/20/19 1345  82 19 102/58 91 %    10/20/19 1330  78 12 102/47Abnormal  90 %    10/20/19 1300  84 10Abnormal  109/50 90 %    10/20/19 1200 98 3 °F (36 8 °C) 78 17 109/52 90 %    10/20/19 1000  86 20 142/65 91 %    10/20/19 0900  82 18 152/68 93 %    10/20/19 0800  82 11Abnormal  164/71 94 %    10/20/19 0700  76 15 163/73 96 %    10/20/19 0553  78 13 158/75 99 % Nasal cannula 2L   10/20/19 0509  89  167/79     10/20/19 0500  82 13 175/78Abnormal  99 %    10/20/19 0446  86 13 180/80Abnormal  100 %    10/20/19 0440  86 16 177/79Abnormal  100 % Nasal cannula 6L   10/20/19 0436  84 18 177/79Abnormal  100 %    10/20/19 0426  84 13 180/80Abnormal  100 %    10/20/19 0331  82 15 174/81Abnormal  100 %    10/20/19 0300  82 16 168/79 100 %    10/20/19 0253  84 12 194/93Abnormal  100 % bipap 60%FiO2       Pertinent Labs/Diagnostic Test Results:   Lab Units 10/21/19  0511 10/20/19  1628 10/20/19  0419 10/19/19  2300   WBC Thousand/uL 10 00  --  10 90* 15 39*   HEMOGLOBIN g/dL 7 6* 7 8* 7 3* 9 6*   HEMATOCRIT % 23 9* 24 0* 22 8* 29 9*   PLATELETS Thousands/uL 281  --  299 472*   NEUTROS ABS Thousands/µL 6 51  --  8 17* 10 56*         Lab Units 10/21/19  0511 10/20/19  0409 10/20/19  0408 10/19/19  2300   SODIUM mmol/L 137 139  --  137   POTASSIUM mmol/L 4 2 4 3  --  3 4*   CHLORIDE mmol/L 99* 99*  --  111*   CO2 mmol/L 28 31  --  18*   ANION GAP mmol/L 10 9  --  8   BUN mg/dL 108* 123*  --  78*   CREATININE mg/dL 6 01* 5 99*  --  3 44*   EGFR ml/min/1 73sq m 10 10  --  20   CALCIUM mg/dL 9 1 10 1  --  5 4*   CALCIUM, IONIZED mmol/L  --   --  1 21  --    MAGNESIUM mg/dL  --  2 3  --  2 3   PHOSPHORUS mg/dL  --  7 1*  --   --      Results from last 7 days   Lab Units 10/20/19  0409 10/19/19  2300   AST U/L 38 55*   ALT U/L 42 26   ALK PHOS U/L 157* 131*   TOTAL PROTEIN g/dL 5 6* 4 1*   ALBUMIN g/dL 2 8* 1 5*   TOTAL BILIRUBIN mg/dL 0 53 0 40     Results from last 7 days   Lab Units 10/21/19  0544 10/20/19  2040 10/20/19  1612 10/20/19  1332 10/20/19  1216 10/20/19  1215 10/20/19  1158 10/20/19  0800 10/20/19  0611 10/20/19  0412   POC GLUCOSE mg/dl 193* 94 171* 115 60* 47* 57* 204* 358* 416*     Lab Units 10/21/19  0511 10/20/19  0409 10/19/19  2300   GLUCOSE RANDOM mg/dL 163* 339* 333*     BETA-HYDROXYBUTYRATE   Date Value Ref Range Status   10/20/2019 0 0 <0 6 mmol/L Final          Results from last 7 days   Lab Units 10/20/19  0035   PH JAMES  7 570*   PCO2 JAMES mm Hg 22 8* PO2 JAMES mm Hg 123 0*   HCO3 JAMES mmol/L 20 4*   BASE EXC JAMES mmol/L -1 0   O2 CONTENT JAMES ml/dL 10 8   O2 HGB, VENOUS % 94 9*     Results from last 7 days   Lab Units 10/20/19  1002 10/20/19  0419 10/19/19  2300   TROPONIN I ng/mL 0 11* 0 11* 0 05*         Results from last 7 days   Lab Units 10/20/19  0419 10/19/19  2300   PROTIME seconds 13 7 12 5   INR  1 09 0 93   PTT seconds 30 31         Results from last 7 days   Lab Units 10/21/19  0511 10/20/19  0409 10/20/19  0115   PROCALCITONIN ng/ml 0 51* 0 33* 0 39*     Results from last 7 days   Lab Units 10/20/19  0419 10/19/19  2300   LACTIC ACID mmol/L 1 3 2 8*       Results from last 7 days   Lab Units 10/19/19  2300   NT-PRO BNP pg/mL 49,506*     10/20 EKG:  Normal sinus rhythm  Inferior infarct , age undetermined    10/20 CXR:  Dense right greater than left bilateral perihilar airspace opacities suspicious for bilateral pneumonia superimposed upon a background of probable pulmonary vascular congestive change        Past Medical History:   Diagnosis Date    Bacteremia 12/21/2018    Cardiac arrest ([facility] )     Diabetes mellitus ([facility] )     Diabetes mellitus type 1 ([facility] )     GI bleed     Hypertension     Infection at site of external fixator pin ([facility] )     MI (myocardial infarction) ([place] [address] )     Pneumonia     Last Assessed [id number]    Renal failure     Renal transplant, status post 07/21/2007     Present on Admission:   Type 1 diabetes mellitus ([facility] )   Hypertension   Non-ST elevation myocardial infarction (NSTEMI), type 2   Stage 5 chronic kidney disease not on chronic dialysis ([facility] )   Acute respiratory failure ([facility] )   Depressed left ventricular ejection fraction   Acute CHF (congestive heart failure) ([facility])      Admitting Diagnosis: Respiratory failure ([facility]) [J96 90]  CHF (congestive heart failure) ([facility] ) [I50 9]  Age/Sex: 51 y o  male  Admission Orders:    Medications:  aspirin 81 mg Oral Daily   atorvastatin 80 mg Oral Daily With Dinner   azaTHIOprine 50 mg Oral Daily   cefepime 1,000 mg Intravenous Q12H   docusate sodium 100 mg Oral Daily   heparin (porcine) 5,000 Units Subcutaneous Q8H [address]   hydrALAZINE 25 mg Oral Q8H [address]   insulin detemir 7 Units Subcutaneous Early Morning   insulin lispro 1-5 Units Subcutaneous HS   insulin lispro 1-5 Units Subcutaneous TID AC   insulin lispro 3 Units Subcutaneous TID With Meals   metoprolol succinate 50 mg Oral Daily   metroNIDAZOLE 500 mg Intravenous Q8H   [START ON 10/22/2019] NIFEdipine ER 90 mg Oral Daily   pantoprazole 40 mg Oral Early Morning   predniSONE 5 mg Oral Daily   senna 1 tablet Oral HS   sevelamer 800 mg Oral TID With Meals   tacrolimus 2 mg Oral HS   tacrolimus 3 mg Oral QAM       bumetanide 1 mg/hr Intravenous Continuous   nitroGLYcerin 5-200 mcg/min Intravenous Titrated       albuterol 2 5 mg Nebulization Q4H PRN   Labetalol HCl 10 mg Intravenous Q6H PRN x2   vancomycin 15 mg/kg Intravenous Daily PRN     furosemide (LASIX) 500 mg infusion 50 mL   Rate: 1 mL/hr Dose: 10 mg/hr  Freq: Continuous Route: IV  Last Dose: 10 mg/hr (10/21/19 0627)  Start: 10/20/19 0830 End: 10/21/19 1012  insulin regular (HumuLIN R,NovoLIN R) 1 Units/mL in sodium chloride 0 9 % 100 mL infusion   Rate: 0 3-21 mL/hr Dose: 0 3-21 Units/hr  Freq: Titrated Route: IV  Last Dose: Stopped (10/20/19 1200)  Start: 10/20/19 0245 End: 10/20/19 1123    IP CONSULT TO CASE MANAGEMENT  IP CONSULT TO NEPHROLOGY  IP CONSULT TO PHARMACY  IP CONSULT TO HEART FAILURE SERVICE  IP CONSULT TO ENDOCRINOLOGY   Accuchecks  Tele  SCDs  Daily weight  VS  Fall precautions  IR consult for HD cath    Network Utilization Review Department  Phone: [phone]; Fax [phone]  [email or web address]  ATTENTION: Please call with any questions or concerns to [phone]  and carefully listen to the prompts so that you are directed to the right person     Send all requests for admission clinical reviews, approved or denied determinations and any other requests to fax [phone]   All voicemails are confidential

## 2019-10-21 NOTE — PROGRESS NOTES
NEPHROLOGY PROGRESS NOTE   Morales Darby 51 y o  male MRN: [id number]  Unit/Bed#: [facility] 529-01 Encounter: [id number]      ASSESSMENT    Pleasant 79-year-old male transferred from [facility] for respiratory distress has a history of living related donor transplant in 2001 has a previous transplant as well, type 1 diabetes mellitus and stage 5 CKD with a baseline creatinine of 4 5 who presented with hypertensive urgency and flash pulmonary edema     1  Acute kidney injury with a history of kidney transplant on  -1 year ago his creatinine was around 2 5-3, will last month he presented with a creatinine in the 6-7   -is interested in home modality  -is a history of a living donor transplant from 2001  -follows with [place] for transplant and Nephrology  2  Stage 5 chronic kidney disease not yet on dialysis  -his baseline creatinine is around 4 5  3  Pulmonary edema  -flash pulmonary edema in the setting of persistent hypertension  -improved respiratory status  4  Type 1 diabetes mellitus  -currently on insulin  5  History of GI bleed  -on PPI  6  Immunosuppressed  -currently on azathioprine 50 mg daily  -tacrolimus 3 mg a m  And 2 mg p m  7    Hypertension  -metoprolol XL 50 mg daily  -nitroglycerin drip  -diuresis    IMPRESSION & PLAN    Patient has had 3 hospitalizations over the last couple of months  Creatinine now worsening up to 6 BUN in the 100s  Electrolytes are currently stable  Currently on a Bumex drip at 1 mg an hour net negative by about half a L thus far  Also receiving metolazone  He is immunosuppressed with tacrolimus 3 mg in the morning and 2 mg at night and azathioprine 50 mg daily which will continue  He has some subtle signs of uremia, now with flash pulmonary edema and poor renal reserve  He likely has progressed to requiring renal replacement therapy, given his acute issues flash pulmonary edema will plan on hemodialysis with a transitioned to a home modality as an outpatient  Currently NPO, discussed with IR and if able they will put a tunneled catheter today will plan on starting hemodialysis tomorrow  This was explained in detail with patient, discussed with Dr Deejay Bowens [facility] regarding dialysis plan, discussed with interventional radiology, and discussed with nurse  Will increase his hydralazine to 50 mg 3 times daily    SUBJECTIVE:    Patient was seen today brother at his bedside breathing is better blood pressures remain elevated feeling better no headaches shortness of breath improved    OBJECTIVE:  Current Weight: Weight - Scale: 54 3 kg (119 lb 11 4 oz)  Vitals:    10/21/19 0707   BP: (!) 193/92   Pulse: 85   Resp: 14   Temp: 98 1 °F (36 7 °C)   SpO2: 99%       Intake/Output Summary (Last 24 hours) at 10/21/2019 1110  Last data filed at 10/21/2019 0900  Gross per 24 hour   Intake 689 35 ml   Output 1825 ml   Net -1135 65 ml       General: conscious, cooperative, in not acute distress  Eyes:  Pallor  ENT: lips and mucous membranes moist  Neck: supple, no JVD  Chest:  Decreased breath sounds  CVS: distinct S1 & S2, normal rate, regular rhythm, PMI displaced  Abdomen: soft, non-tender, non-distended, normoactive bowel sounds  Extremities:  Anasarca  Skin: no rash  Neuro: awake, alert, oriented        Medications:    Current Facility-Administered Medications:     albuterol inhalation solution 2 5 mg, 2 5 mg, Nebulization, Q4H PRN, Mike Cordova MD    aspirin chewable tablet 81 mg, 81 mg, Oral, Daily, Jayne Taylor PA-C, 81 mg at 10/21/19 0844    atorvastatin (LIPITOR) tablet 80 mg, 80 mg, Oral, Daily With [facility] E[facility]UEL [place], 80 mg at 10/20/19 1721    azaTHIOprine (IMURAN) tablet 50 mg, 50 mg, Oral, Daily, Rosalva Taylor PA-C, 50 mg at 10/21/19 0844    bumetanide (BUMEX) 12 5 mg infusion 50 mL, 1 mg/hr, Intravenous, Continuous, Gisel Boyd MD    cefepime (MAXIPIME) 1,000 mg in dextrose 5 % 50 mL IVPB, 1,000 mg, Intravenous, Q12H, Lucrecia Ruiz PA-C, Stopped at 10/21/19 0052    docusate sodium (COLACE) capsule 100 mg, 100 mg, Oral, Daily, Rosalva Taylor PA-C, 100 mg at 10/21/19 0844    heparin (porcine) subcutaneous injection 5,000 Units, 5,000 Units, Subcutaneous, Q8H [address], 5,000 Units at 10/21/19 0512 **AND** [CANCELED] Platelet count, , , Once, Murali Meneses MD    hydrALAZINE (APRESOLINE) tablet 25 mg, 25 mg, Oral, Q8H [address], Rosalav Taylor PA-C, 25 mg at 10/21/19 0511    insulin detemir (LEVEMIR) subcutaneous injection 7 Units, 7 Units, Subcutaneous, Early Morning, Aurbee Lynch MD    insulin lispro (HumaLOG) 100 units/mL subcutaneous injection 1-5 Units, 1-5 Units, Subcutaneous, HS, Aubree Lynch MD, Stopped at 10/20/19 2200    insulin lispro (HumaLOG) 100 units/mL subcutaneous injection 1-5 Units, 1-5 Units, Subcutaneous, TID AC, 2 Units at 10/21/19 0923 **AND** Fingerstick Glucose (POCT), , , TID AC, Duane Nelson MD    insulin lispro (HumaLOG) 100 units/mL subcutaneous injection 3 Units, 3 Units, Subcutaneous, TID With Meals, Duane Nelson MD, Stopped at 10/21/19 0730    Labetalol HCl (NORMODYNE) injection 10 mg, 10 mg, Intravenous, Q6H PRN, David Davis PA-C, 10 mg at 10/21/19 0236    metoprolol succinate (TOPROL-XL) 24 hr tablet 50 mg, 50 mg, Oral, Daily, Rosalva Taylor PA-C, 50 mg at 10/21/19 0844    metroNIDAZOLE (FLAGYL) IVPB (premix) 500 mg, 500 mg, Intravenous, Q8H, Rosalva Taylor PA-C, Last Rate: 200 mL/hr at 10/21/19 0921, 500 mg at 10/21/19 0921    [START ON 10/22/2019] NIFEdipine (PROCARDIA XL) 24 hr tablet 90 mg, 90 mg, Oral, Daily, Fernando Reyes MD    nitroGLYcerin (TRIDIL) 50 mg in 250 mL infusion (premix), 5-200 mcg/min, Intravenous, Titrated, David Davis PA-C, Stopped at 10/20/19 1330    pantoprazole (PROTONIX) EC tablet 40 mg, 40 mg, Oral, Early Morning, Rosalva Taylor PA-C, 40 mg at 10/21/19 0511    predniSONE tablet 5 mg, 5 mg, Oral, Daily, Jamila Taylor PA-C, 5 mg at 10/21/19 0807    senna (SENOKOT) tablet 8 6 mg, 1 tablet, Oral, HS, Diamond Taylor PA-C    sevelamer (RENAGEL) tablet 800 mg, 800 mg, Oral, TID With Meals, Deon Garrido PA-C, Stopped at 10/21/19 0730    tacrolimus (PROGRAF) capsule 2 mg, 2 mg, Oral, HS, Diamond Taylor PA-C, 2 mg at 10/20/19 2105    tacrolimus (PROGRAF) capsule 3 mg, 3 mg, Oral, QAM, Rosalva Taylor PA-C, 3 mg at 10/21/19 0846    vancomycin (VANCOCIN) IVPB (premix) 750 mg, 15 mg/kg, Intravenous, Daily PRN, Deon Garrido PA-C    Invasive Devices:      Lab Results:   Results from last 7 days   Lab Units 10/21/19  0511 10/20/19  1628 10/20/19  0419 10/20/19  0409 10/19/19  2300 10/18/19  0805 10/15/19  0528   WBC Thousand/uL 10 00  --  10 90*  --  15 39* 11 94* 8 19   HEMOGLOBIN g/dL 7 6* 7 8* 7 3*  --  9 6* 8 4* 8 5*   HEMATOCRIT % 23 9* 24 0* 22 8*  --  29 9* 26 8* 26 7*   PLATELETS Thousands/uL 281  --  299  --  472* 318 320   POTASSIUM mmol/L 4 2  --   --  4 3 3 4* 4 6 4 2   CHLORIDE mmol/L 99*  --   --  99* 111* 102 99*   CO2 mmol/L 28  --   --  31 18* 29 24   BUN mg/dL 108*  --   --  123* 78* 116* 121*   CREATININE mg/dL 6 01*  --   --  5 99* 3 44* 5 62* 5 49*   CALCIUM mg/dL 9 1  --   --  10 1 5 4* 9 0 8 2*   MAGNESIUM mg/dL  --   --   --  2 3 2 3  --   --    PHOSPHORUS mg/dL  --   --   --  7 1*  --   --   --    ALK PHOS U/L  --   --   --  157* 131*  --   --    ALT U/L  --   --   --  42 26  --   --    AST U/L  --   --   --  38 55*  --   --        Previous work up:  Please see previous note

## 2019-10-21 NOTE — PROGRESS NOTES
H and P from admission reviewed  Breathing improved  Off bipap  Able to comfortably lay relatively flat  Referred for tunneled HD cath placement for worsening renal function of transplanted kidney  Informed written consent was obtained      Teresa Otoole MD  10/21/19  2:04 PM

## 2019-10-21 NOTE — ASSESSMENT & PLAN NOTE
· Likely secondary to acute pulmonary edema in setting of hypertensive emergency and Jamshid on CKD  · Initially required BiPAP and ICU admission  · Currently on nasal cannula  · Was also started on broad-spectrum antibiotics given high procalcitonin which can be falls in setting of acute kidney injury on CKD  · Continue Bumex drip per Nephrology and Cardiology  · Plan for eventual dialysis  PermCath likely today  · O2 supplementation as needed to maintain O2 sat above 89%  · Infectious workup pending, follow blood cultures and MRSA swab  · Consider rapid discontinuation of antibiotics if infectious workup negative and if patient is afebrile  · Trend procalcitonin in a m  Bhumi Alcocer Irl Carlyn

## 2019-10-21 NOTE — ASSESSMENT & PLAN NOTE
Lab Results   Component Value Date    HGBA1C 4 8 10/11/2019       Recent Labs     10/20/19  2040 10/21/19  0544 10/21/19  0918 10/21/19  1127   POCGLU 94 193* 259* 195*       Blood Sugar Average: Last 72 hrs:  (P) 021 [id number]     Endocrinology on board  Continue with basal bolus regimen  Seven units Lantus at bedtime with 3 units t i d  Lispro for mealtime coverage  Appreciate Endocrinology input  Insulin sliding scale and Accu-Cheks

## 2019-10-22 ENCOUNTER — APPOINTMENT (INPATIENT)
Dept: DIALYSIS | Facility: HOSPITAL | Age: 49
DRG: 291 | End: 2019-10-22
Payer: COMMERCIAL

## 2019-10-22 LAB
ANION GAP SERPL CALCULATED.3IONS-SCNC: 11 MMOL/L (ref 4–13)
BASOPHILS # BLD AUTO: 0.09 THOUSANDS/ΜL (ref 0–0.1)
BASOPHILS NFR BLD AUTO: 1 % (ref 0–1)
BUN SERPL-MCNC: 123 MG/DL (ref 5–25)
CALCIUM SERPL-MCNC: 8.7 MG/DL (ref 8.3–10.1)
CHLORIDE SERPL-SCNC: 97 MMOL/L (ref 100–108)
CO2 SERPL-SCNC: 25 MMOL/L (ref 21–32)
CREAT SERPL-MCNC: 6.52 MG/DL (ref 0.6–1.3)
EOSINOPHIL # BLD AUTO: 0.51 THOUSAND/ΜL (ref 0–0.61)
EOSINOPHIL NFR BLD AUTO: 5 % (ref 0–6)
ERYTHROCYTE [DISTWIDTH] IN BLOOD BY AUTOMATED COUNT: 15.5 % (ref 11.6–15.1)
GFR SERPL CREATININE-BSD FRML MDRD: 9 ML/MIN/1.73SQ M
GLUCOSE SERPL-MCNC: 147 MG/DL (ref 65–140)
GLUCOSE SERPL-MCNC: 170 MG/DL (ref 65–140)
GLUCOSE SERPL-MCNC: 233 MG/DL (ref 65–140)
GLUCOSE SERPL-MCNC: 309 MG/DL (ref 65–140)
GLUCOSE SERPL-MCNC: 320 MG/DL (ref 65–140)
HCT VFR BLD AUTO: 25.2 % (ref 36.5–49.3)
HGB BLD-MCNC: 8 G/DL (ref 12–17)
IMM GRANULOCYTES # BLD AUTO: 0.05 THOUSAND/UL (ref 0–0.2)
IMM GRANULOCYTES NFR BLD AUTO: 1 % (ref 0–2)
LYMPHOCYTES # BLD AUTO: 1.9 THOUSANDS/ΜL (ref 0.6–4.47)
LYMPHOCYTES NFR BLD AUTO: 17 % (ref 14–44)
MCH RBC QN AUTO: 30.1 PG (ref 26.8–34.3)
MCHC RBC AUTO-ENTMCNC: 31.7 G/DL (ref 31.4–37.4)
MCV RBC AUTO: 95 FL (ref 82–98)
MONOCYTES # BLD AUTO: 0.81 THOUSAND/ΜL (ref 0.17–1.22)
MONOCYTES NFR BLD AUTO: 7 % (ref 4–12)
NEUTROPHILS # BLD AUTO: 7.61 THOUSANDS/ΜL (ref 1.85–7.62)
NEUTS SEG NFR BLD AUTO: 69 % (ref 43–75)
NRBC BLD AUTO-RTO: 0 /100 WBCS
PLATELET # BLD AUTO: 281 THOUSANDS/UL (ref 149–390)
PMV BLD AUTO: 8.9 FL (ref 8.9–12.7)
POTASSIUM SERPL-SCNC: 4 MMOL/L (ref 3.5–5.3)
RBC # BLD AUTO: 2.66 MILLION/UL (ref 3.88–5.62)
SODIUM SERPL-SCNC: 133 MMOL/L (ref 136–145)
TACROLIMUS BLD-MCNC: 4.5 NG/ML (ref 2–20)
VANCOMYCIN SERPL-MCNC: 17.8 UG/ML
WBC # BLD AUTO: 10.97 THOUSAND/UL (ref 4.31–10.16)

## 2019-10-22 PROCEDURE — 5A1D70Z PERFORMANCE OF URINARY FILTRATION, INTERMITTENT, LESS THAN 6 HOURS PER DAY: ICD-10-PCS | Performed by: INTERNAL MEDICINE

## 2019-10-22 PROCEDURE — 82948 REAGENT STRIP/BLOOD GLUCOSE: CPT

## 2019-10-22 PROCEDURE — 99232 SBSQ HOSP IP/OBS MODERATE 35: CPT | Performed by: INTERNAL MEDICINE

## 2019-10-22 PROCEDURE — 80048 BASIC METABOLIC PNL TOTAL CA: CPT | Performed by: INTERNAL MEDICINE

## 2019-10-22 PROCEDURE — 90935 HEMODIALYSIS ONE EVALUATION: CPT | Performed by: INTERNAL MEDICINE

## 2019-10-22 PROCEDURE — 85025 COMPLETE CBC W/AUTO DIFF WBC: CPT | Performed by: INTERNAL MEDICINE

## 2019-10-22 PROCEDURE — 80202 ASSAY OF VANCOMYCIN: CPT | Performed by: PHYSICIAN ASSISTANT

## 2019-10-22 PROCEDURE — 99232 SBSQ HOSP IP/OBS MODERATE 35: CPT | Performed by: FAMILY MEDICINE

## 2019-10-22 RX ORDER — TACROLIMUS 1 MG/1
2 CAPSULE ORAL
Status: ON HOLD | COMMUNITY
End: 2020-08-25 | Stop reason: ALTCHOICE

## 2019-10-22 RX ORDER — TACROLIMUS 1 MG/1
1 CAPSULE ORAL 3 TIMES DAILY
COMMUNITY
End: 2020-09-10 | Stop reason: SDUPTHER

## 2019-10-22 RX ORDER — CLONIDINE HYDROCHLORIDE 0.1 MG/1
0.1 TABLET ORAL ONCE
Status: COMPLETED | OUTPATIENT
Start: 2019-10-23 | End: 2019-10-23

## 2019-10-22 RX ORDER — HYDRALAZINE HYDROCHLORIDE 50 MG/1
50 TABLET, FILM COATED ORAL EVERY 8 HOURS SCHEDULED
Status: DISCONTINUED | OUTPATIENT
Start: 2019-10-22 | End: 2019-10-25 | Stop reason: HOSPADM

## 2019-10-22 RX ADMIN — METRONIDAZOLE 500 MG: 500 INJECTION, SOLUTION INTRAVENOUS at 12:12

## 2019-10-22 RX ADMIN — HYDRALAZINE HYDROCHLORIDE 50 MG: 50 TABLET ORAL at 21:41

## 2019-10-22 RX ADMIN — HEPARIN SODIUM 5000 UNITS: 5000 INJECTION INTRAVENOUS; SUBCUTANEOUS at 05:38

## 2019-10-22 RX ADMIN — TACROLIMUS 3 MG: 1 CAPSULE ORAL at 12:10

## 2019-10-22 RX ADMIN — INSULIN LISPRO 1 UNITS: 100 INJECTION, SOLUTION INTRAVENOUS; SUBCUTANEOUS at 12:11

## 2019-10-22 RX ADMIN — INSULIN LISPRO 3 UNITS: 100 INJECTION, SOLUTION INTRAVENOUS; SUBCUTANEOUS at 07:49

## 2019-10-22 RX ADMIN — PANTOPRAZOLE SODIUM 40 MG: 40 TABLET, DELAYED RELEASE ORAL at 05:39

## 2019-10-22 RX ADMIN — INSULIN LISPRO 2 UNITS: 100 INJECTION, SOLUTION INTRAVENOUS; SUBCUTANEOUS at 21:42

## 2019-10-22 RX ADMIN — ACETAMINOPHEN 650 MG: 325 TABLET ORAL at 05:38

## 2019-10-22 RX ADMIN — INSULIN LISPRO 3 UNITS: 100 INJECTION, SOLUTION INTRAVENOUS; SUBCUTANEOUS at 17:15

## 2019-10-22 RX ADMIN — METRONIDAZOLE 500 MG: 500 INJECTION, SOLUTION INTRAVENOUS at 17:15

## 2019-10-22 RX ADMIN — INSULIN LISPRO 3 UNITS: 100 INJECTION, SOLUTION INTRAVENOUS; SUBCUTANEOUS at 07:48

## 2019-10-22 RX ADMIN — INSULIN DETEMIR 7 UNITS: 100 INJECTION, SOLUTION SUBCUTANEOUS at 06:45

## 2019-10-22 RX ADMIN — HYDRALAZINE HYDROCHLORIDE 50 MG: 50 TABLET, FILM COATED ORAL at 05:39

## 2019-10-22 RX ADMIN — SEVELAMER HYDROCHLORIDE 800 MG: 800 TABLET, FILM COATED PARENTERAL at 17:16

## 2019-10-22 RX ADMIN — LABETALOL 20 MG/4 ML (5 MG/ML) INTRAVENOUS SYRINGE 10 MG: at 13:38

## 2019-10-22 RX ADMIN — INSULIN LISPRO 3 UNITS: 100 INJECTION, SOLUTION INTRAVENOUS; SUBCUTANEOUS at 12:11

## 2019-10-22 RX ADMIN — LABETALOL 20 MG/4 ML (5 MG/ML) INTRAVENOUS SYRINGE 10 MG: at 19:29

## 2019-10-22 RX ADMIN — CEFEPIME HYDROCHLORIDE 1000 MG: 1 INJECTION, POWDER, FOR SOLUTION INTRAMUSCULAR; INTRAVENOUS at 00:18

## 2019-10-22 RX ADMIN — CEFEPIME HYDROCHLORIDE 1000 MG: 1 INJECTION, POWDER, FOR SOLUTION INTRAMUSCULAR; INTRAVENOUS at 12:13

## 2019-10-22 RX ADMIN — HEPARIN SODIUM 5000 UNITS: 5000 INJECTION INTRAVENOUS; SUBCUTANEOUS at 13:35

## 2019-10-22 RX ADMIN — METRONIDAZOLE 500 MG: 500 INJECTION, SOLUTION INTRAVENOUS at 01:03

## 2019-10-22 RX ADMIN — SEVELAMER HYDROCHLORIDE 800 MG: 800 TABLET, FILM COATED PARENTERAL at 12:10

## 2019-10-22 RX ADMIN — TACROLIMUS 2 MG: 1 CAPSULE ORAL at 21:42

## 2019-10-22 RX ADMIN — HEPARIN SODIUM 5000 UNITS: 5000 INJECTION INTRAVENOUS; SUBCUTANEOUS at 21:41

## 2019-10-22 RX ADMIN — HYDRALAZINE HYDROCHLORIDE 50 MG: 50 TABLET ORAL at 13:34

## 2019-10-22 RX ADMIN — VANCOMYCIN HYDROCHLORIDE 750 MG: 750 INJECTION, SOLUTION INTRAVENOUS at 11:00

## 2019-10-22 RX ADMIN — Medication 1 MG/HR: at 05:01

## 2019-10-22 RX ADMIN — SEVELAMER HYDROCHLORIDE 800 MG: 800 TABLET, FILM COATED PARENTERAL at 07:48

## 2019-10-22 RX ADMIN — ATORVASTATIN CALCIUM 80 MG: 80 TABLET, FILM COATED ORAL at 17:15

## 2019-10-22 NOTE — PROGRESS NOTES
Vancomycin IV Pharmacy-to-Dose Consultation    Jose Daniel Flowers is a 51 y o  male who is currently receiving Vancomycin IV with management by the Pharmacy Consult service  Assessment/Plan:  The patient was reviewed  Renal function is stable and no signs or symptoms of nephrotoxicity and/or infusion reactions were documented in the chart  Based on todays assessment, continue current vancomycin (day # 3) dosing of 750 mg prn level< 20 post dialysis, with a plan for trough to be drawn at am draw on 10-24  We will continue to follow the patients culture results and clinical progress daily      Jose Omalley, Pharmacist

## 2019-10-22 NOTE — ASSESSMENT & PLAN NOTE
Lab Results   Component Value Date    HGBA1C 4 8 10/11/2019       Recent Labs     10/21/19  1634 10/21/19  2050 10/22/19  0639 10/22/19  1207   POCGLU 143* 159* 320* 170*       Blood Sugar Average: Last 72 hrs:  (P) 197 [id number]     Endocrinology on board  Continue with basal bolus regimen  Seven units Lantus at bedtime with 3 units t i d  Lispro for mealtime coverage  Appreciate Endocrinology input  Insulin sliding scale and Accu-Cheks

## 2019-10-22 NOTE — ASSESSMENT & PLAN NOTE
· Status post renal transplant 2001  · Baseline creatinine 4-5  · Creatinine 6 52 today  · Patient was started on hemodialysis assisted by Nephrology  Had another session today    · Patient is currently on Bumex drip

## 2019-10-22 NOTE — PLAN OF CARE
Goal- 1 L as tolerated       Problem: METABOLIC, FLUID AND ELECTROLYTES - ADULT  Goal: Electrolytes maintained within normal limits  Description  INTERVENTIONS:  - Monitor labs and assess patient for signs and symptoms of electrolyte imbalances  - Administer electrolyte replacement as ordered  - Monitor response to electrolyte replacements, including repeat lab results as appropriate  - Instruct patient on fluid and nutrition as appropriate  Outcome: Progressing

## 2019-10-22 NOTE — RESPIRATORY THERAPY NOTE
RT Protocol Note  Douglass Pac 51 y o  male MRN: [id number]  Unit/Bed#: [facility] 529-01 Encounter: [id number]    Assessment    Principal Problem:    Acute respiratory failure with hypoxia ([facility] )  Active Problems:    Type 1 diabetes mellitus ([facility] )    Renal transplant, status post    Hypertension    Elevated troponin    Acute kidney injury superimposed on CKD (HCC)    Chronic anemia    Depressed left ventricular ejection fraction    Acute pulmonary edema (HCC)    Hx of right BKA (HCC)      Home Pulmonary Medications:         Past Medical History:   Diagnosis Date    Bacteremia 12/21/2018    Cardiac arrest ([facility] )     Diabetes mellitus ([facility] )     Diabetes mellitus type 1 ([facility] )     GI bleed     Hypertension     [facility]     MI (myocardial infarction) ([place] [address] )     Pneumonia     Last Assessed [id number]    Renal failure     Renal transplant, status post 07/21/2007     Social History     Socioeconomic History    Marital status: /Civil Union     Spouse name: Not on file    Number of children: Not on file    Years of education: Not on file    Highest education level: Not on file   Occupational History    Not on file   Social Needs    Financial resource strain: Not on file    Food insecurity:     Worry: Not on file     Inability: Not on file    Transportation needs:     Medical: Not on file     Non-medical: Not on file   Tobacco Use    Smoking status: Never Smoker    Smokeless tobacco: Never Used   Substance and Sexual Activity    Alcohol use: Not Currently    Drug use: Never    Sexual activity: Yes     Partners: Female   Lifestyle    Physical activity:     Days per week: Not on file     Minutes per session: Not on file    Stress: Not on file   Relationships    Social connections:     Talks on phone: Not on file     Gets together: Not on file     Attends Hoahaoism service: Not on file     Active member of club or organization: Not on file     Attends meetings of clubs or organizations: Not on file     Relationship status: Not on file    Intimate partner violence:     Fear of current or ex partner: Not on file     Emotionally abused: Not on file     Physically abused: Not on file     Forced sexual activity: Not on file   Other Topics Concern    Not on file   Social History Narrative    No living will       Subjective         Objective    Physical Exam:   Assessment Type: (P) Assess only  General Appearance: (P) Alert, Awake  Respiratory Pattern: (P) Normal  Chest Assessment: (P) Chest expansion symmetrical  Bilateral Breath Sounds: (P) Clear, Diminished  R Breath Sounds: (P) Diminished  L Breath Sounds: (P) Clear  Cough: (P) None    Vitals:  Blood pressure (!) 177/79, pulse 90, temperature 98 °F (36 7 °C), temperature source Oral, resp  rate 17, height 5' 4" (1 626 m), weight 52 1 kg (114 lb 14 4 oz), SpO2 99 %  Imaging and other studies: I have personally reviewed pertinent reports        O2 Device: 2 5  l NC     Plan    Respiratory Plan: No distress/Pulmonary history        Resp Comments: (P) pt sitting at side of bed, denies sob, no distress noted atn this time, will pt has not needed prn neb, will d/c udn and respiratory protocol at this time and re institute if needed at further time

## 2019-10-22 NOTE — PROGRESS NOTES
NEPHROLOGY PROGRESS NOTE   Hemodialysis procedure note:  Benjamin Cohen 51 y o  male MRN: [id number]  Unit/Bed#: [facility] 529-01 Encounter: [id number]      ASSESSMENT    Pleasant 69-year-old male transferred from [facility]'s for respiratory distress has a history of living related donor transplant in 2001 has a previous transplant as well, type 1 diabetes mellitus and stage 5 CKD with a baseline creatinine of 4 5 who presented with hypertensive urgency and flash pulmonary edema     1  Acute kidney injury with a history of kidney transplant on  -1 year ago his creatinine was around 2 5-3, over the last 2 months he has had creatinine in the 5-6 range with over 3 hospital admission  -is interested in home modality in the long-term  -is a history of a living donor transplant from 2001  -follows with [place] for transplant and Nephrology  2  Stage 5 chronic kidney disease not yet on dialysis  -his baseline creatinine is around 4 5 but likely now with multiple acute kidney injury he has progression and his GFR is 10 mL per minute, given his 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 his GFR is overestimated  3  Pulmonary edema  -flash pulmonary edema in the setting of persistent hypertension  -improved respiratory status  4  Type 1 diabetes mellitus  -currently on insulin  5  History of GI bleed  -on PPI  6  Immunosuppressed  -currently on azathioprine 50 mg daily  -tacrolimus 3 mg a m  And 2 mg p m  7    Hypertension  -metoprolol XL 50 mg daily  -nitroglycerin drip  -diuresis    IMPRESSION & PLAN  Hemodialysis procedure note:-patient seen at the initiation of dialysis at approximately 9:06 a m   -did make 1 7 L of urine  -tacrolimus level is pending  -tunneled dialysis catheter placed appreciate placement by interventional radiology  -indication for dialysis: volume overload and uremia refractory to diuretics-3 hospitalization 2 at [facility] and now 1 at [facility] for volume overload  -consent obtained for dialysis and in the chart  -currently blood pressure is being treated with nifedipine 90 mg daily, metoprolol XL 50 mg daily, and hydralazine 50 mg every 8 hours, would hold hydralazine if systolic blood pressures are less than 130, when dialysis initiated blood pressures should improve as volume status improved  Using tunneled dialysis catheter with a blood flow rate of 200 a dialysate flow rate of 300 a bicarb of 30 a 2 potassium 136 sodium without F 160 dialyzer for 2 hours today  -continue Bumex drip for the next 24 hours and then discontinue if any signs of hypotension can discontinue  -will plan on repeat treatment tomorrow for 3 hours with 2-3 L ultrafiltration as tolerated  -patient being worked up for a pneumonia currently on antibiotics      SUBJECTIVE:    Patient seen at the initiation of dialysis treatment seems to be tolerating treatment has had a net negative fluid balance no shortness of breath able to move someone blood pressures remain elevated tolerating some p o   Intake    OBJECTIVE:  Current Weight: Weight - Scale: 52 1 kg (114 lb 14 4 oz)  Vitals:    10/22/19 0737   BP: (!) 177/79   Pulse: 90   Resp: 17   Temp: 98 °F (36 7 °C)   SpO2: 99%       Intake/Output Summary (Last 24 hours) at 10/22/2019 0857  Last data filed at 10/22/2019 0805  Gross per 24 hour   Intake 714 93 ml   Output 2150 ml   Net -1435 07 ml       General: conscious, cooperative, in no acute distress  Eyes:  Pallor  ENT: lips and mucous membranes moist  Neck: supple, no JVD  Chest:  Decreased breath sounds  CVS: normal rate, regular rhythm  Abdomen: soft, non-tender, non-distended, normoactive bowel sounds  Extremities:  Right above-the-knee amputation  Skin: no rash  Neuro: awake, alert, oriented  Psych:  Flat affect        Medications:    Current Facility-Administered Medications:     acetaminophen (TYLENOL) tablet 650 mg, 650 mg, Oral, Q6H PRN, Dona Perdomo MD, 650 mg at 10/22/19 0538    albuterol inhalation solution 2 5 mg, 2 5 mg, Nebulization, Q4H PRN, Yennifer Henriquez MD    aspirin chewable tablet 81 mg, 81 mg, Oral, Daily, Bethel Taylor PA-C, 81 mg at 10/21/19 0844    atorvastatin (LIPITOR) tablet 80 mg, 80 mg, Oral, Daily With Yamel Anne PA-C, 80 mg at 10/21/19 1740    azaTHIOprine (IMURAN) tablet 50 mg, 50 mg, Oral, Daily, Rosalva Taylor PA-C, 50 mg at 10/21/19 0844    bumetanide (BUMEX) 12 5 mg infusion 50 mL, 1 mg/hr, Intravenous, Continuous, Greyson Castillo MD, Last Rate: 4 mL/hr at 10/22/19 0501, 1 mg/hr at 10/22/19 0501    cefepime (MAXIPIME) 1,000 mg in dextrose 5 % 50 mL IVPB, 1,000 mg, Intravenous, Q12H, Dale Beck PA-C, Stopped at 10/22/19 0055    docusate sodium (COLACE) capsule 100 mg, 100 mg, Oral, Daily, Rosalva Taylor PA-C, 100 mg at 10/21/19 0844    heparin (porcine) subcutaneous injection 5,000 Units, 5,000 Units, Subcutaneous, Q8H [facility], 5,000 Units at 10/22/19 0538 **AND** [CANCELED] Platelet count, , , Once, Nagi Orourke MD    hydrALAZINE (APRESOLINE) tablet 50 mg, 50 mg, Oral, Q8H [facility], Brien Cali DO, 50 mg at 10/22/19 0539    insulin detemir (LEVEMIR) subcutaneous injection 7 Units, 7 Units, Subcutaneous, Early Morning, Aubree Lynch MD, 7 Units at 10/22/19 0645    insulin lispro (HumaLOG) 100 units/mL subcutaneous injection 1-5 Units, 1-5 Units, Subcutaneous, HS, Aubree Lynch MD, 1 Units at 10/21/19 2139    insulin lispro (HumaLOG) 100 units/mL subcutaneous injection 1-5 Units, 1-5 Units, Subcutaneous, TID AC, 3 Units at 10/22/19 0749 **AND** Fingerstick Glucose (POCT), , , TID AC, Travis Xiong MD    insulin lispro (HumaLOG) 100 units/mL subcutaneous injection 3 Units, 3 Units, Subcutaneous, TID With Meals, Margareth Swanson MD, 3 Units at 10/22/19 0748    Labetalol HCl (NORMODYNE) injection 10 mg, 10 mg, Intravenous, Q6H PRN, Bethel Taylor PA-C, 10 mg at 10/21/19 1117    metoprolol succinate (TOPROL-XL) 24 hr tablet 50 mg, 50 mg, Oral, Daily, Lachelle Taylor PA-C, 50 mg at 10/21/19 0844    metroNIDAZOLE (FLAGYL) IVPB (premix) 500 mg, 500 mg, Intravenous, Q8H, Julia Murphy PA-C, Stopped at 10/22/19 0147    NIFEdipine (PROCARDIA XL) 24 hr tablet 90 mg, 90 mg, Oral, Daily, Shashi Wright MD    nitroGLYcerin (TRIDIL) 50 mg in 250 mL infusion (premix), 5-200 mcg/min, Intravenous, Titrated, Julia Murphy PA-C, Stopped at 10/20/19 1330    pantoprazole (PROTONIX) EC tablet 40 mg, 40 mg, Oral, Early Morning, Rosalva Taylor PA-C, 40 mg at 10/22/19 0539    predniSONE tablet 5 mg, 5 mg, Oral, Daily, Lachelle Taylor PA-C, 5 mg at 10/21/19 0844    senna (SENOKOT) tablet 8 6 mg, 1 tablet, Oral, HS, Julia Murphy PA-C    sevelamer (RENAGEL) tablet 800 mg, 800 mg, Oral, TID With Meals, Julia Murphy PA-C, 800 mg at 10/22/19 0748    tacrolimus (PROGRAF) capsule 2 mg, 2 mg, Oral, HS, Lachelle Taylor PA-C, 2 mg at 10/21/19 2139    tacrolimus (PROGRAF) capsule 3 mg, 3 mg, Oral, QAM, Rosalva Taylor PA-C, 3 mg at 10/21/19 0846    vancomycin (VANCOCIN) IVPB (premix) 750 mg, 15 mg/kg, Intravenous, Daily PRN, Julia Murphy PA-C    Invasive Devices:      Lab Results:   Results from last 7 days   Lab Units 10/21/19  0511 10/20/19  1628 10/20/19  0419 10/20/19  0409 10/20/19  0029 10/20/19  0002 10/19/19  2300 10/18/19  0805   WBC Thousand/uL 10 00  --  10 90*  --   --   --  15 39* 11 94*   HEMOGLOBIN g/dL 7 6* 7 8* 7 3*  --   --   --  9 6* 8 4*   HEMATOCRIT % 23 9* 24 0* 22 8*  --   --   --  29 9* 26 8*   PLATELETS Thousands/uL 281  --  299  --   --   --  472* 318   POTASSIUM mmol/L 4 2  --   --  4 3  --   --  3 4* 4 6   CHLORIDE mmol/L 99*  --   --  99*  --   --  111* 102   CO2 mmol/L 28  --   --  31  --   --  18* 29   BUN mg/dL 108*  --   --  123*  --   --  78* 116*   CREATININE mg/dL 6 01*  --   --  5 99*  --   --  3 44* 5 62*   CALCIUM mg/dL 9 1  --   --  10 1  --   --  5 4* 9 0   MAGNESIUM mg/dL  --   --   --  2 3  --   --  2 3 --    PHOSPHORUS mg/dL  --   --   --  7 1*  --   --   --   --    ALK PHOS U/L  --   --   --  157*  --   --  131*  --    ALT U/L  --   --   --  42  --   --  26  --    AST U/L  --   --   --  38  --   --  55*  --    BLOOD CULTURE   --   --   --   --  No Growth at 24 hrs   No Growth at 24 hrs   --   --        Previous work up:  Please see previous note

## 2019-10-22 NOTE — ASSESSMENT & PLAN NOTE
· Presented with hypertensive  emergency, now better controlled  · Continue with Bumex infusion  · Continue with Toprol-XL 50 mg daily, Procardia XL 90 mg daily, hydralazine 50 mg q 8 hours  · P r n  Labetalol ordered  · Off nitroglycerin drip

## 2019-10-22 NOTE — PLAN OF CARE
Problem: Potential for Falls  Goal: Patient will remain free of falls  Description  INTERVENTIONS:  - Assess patient frequently for physical needs  -  Identify cognitive and physical deficits and behaviors that affect risk of falls    -  [place] fall precautions as indicated by assessment   - Educate patient/family on patient safety including physical limitations  - Instruct patient to call for assistance with activity based on assessment  - Modify environment to reduce risk of injury  - Consider OT/PT consult to assist with strengthening/mobility  Outcome: Progressing     Problem: Prexisting or High Potential for Compromised Skin Integrity  Goal: Skin integrity is maintained or improved  Description  INTERVENTIONS:  - Identify patients at risk for skin breakdown  - Assess and monitor skin integrity  - Assess and monitor nutrition and hydration status  - Monitor labs   - Assess for incontinence   - Turn and reposition patient  - Assist with mobility/ambulation  - Relieve pressure over bony prominences  - Avoid friction and shearing  - Provide appropriate hygiene as needed including keeping skin clean and dry  - Evaluate need for skin moisturizer/barrier cream  - Collaborate with interdisciplinary team   - Patient/family teaching  - Consider wound care consult   Outcome: Progressing     Problem: PAIN - ADULT  Goal: Verbalizes/displays adequate comfort level or baseline comfort level  Description  Interventions:  - Encourage patient to monitor pain and request assistance  - Assess pain using appropriate pain scale  - Administer analgesics based on type and severity of pain and evaluate response  - Implement non-pharmacological measures as appropriate and evaluate response  - Consider cultural and social influences on pain and pain management  - Notify physician/advanced practitioner if interventions unsuccessful or patient reports new pain  Outcome: Progressing     Problem: INFECTION - ADULT  Goal: Absence or prevention of progression during hospitalization  Description  INTERVENTIONS:  - Assess and monitor for signs and symptoms of infection  - Monitor lab/diagnostic results  - Monitor all insertion sites, i e  indwelling lines, tubes, and drains  - Monitor endotracheal if appropriate and nasal secretions for changes in amount and color  - [place] appropriate cooling/warming therapies per order  - Administer medications as ordered  - Instruct and encourage patient and family to use good hand hygiene technique  - Identify and instruct in appropriate isolation precautions for identified infection/condition  Outcome: Progressing     Problem: SAFETY ADULT  Goal: Maintain or return to baseline ADL function  Description  INTERVENTIONS:  -  Assess patient's ability to carry out ADLs; assess patient's baseline for ADL function and identify physical deficits which impact ability to perform ADLs (bathing, care of mouth/teeth, toileting, grooming, dressing, etc )  - Assess/evaluate cause of self-care deficits   - Assess range of motion  - Assess patient's mobility; develop plan if impaired  - Assess patient's need for assistive devices and provide as appropriate  - Encourage maximum independence but intervene and supervise when necessary  - Involve family in performance of ADLs  - Assess for home care needs following discharge   - Consider OT consult to assist with ADL evaluation and planning for discharge  - Provide patient education as appropriate  Outcome: Progressing  Goal: Maintain or return mobility status to optimal level  Description  INTERVENTIONS:  - Assess patient's baseline mobility status (ambulation, transfers, stairs, etc )    - Identify cognitive and physical deficits and behaviors that affect mobility  - Identify mobility aids required to assist with transfers and/or ambulation (gait belt, sit-to-stand, lift, walker, cane, etc )  - [place] fall precautions as indicated by assessment  - Record patient progress and toleration of activity level on Mobility SBAR; progress patient to next Phase/Stage  - Instruct patient to call for assistance with activity based on assessment  - Consider rehabilitation consult to assist with strengthening/weightbearing, etc   Outcome: Progressing     Problem: DISCHARGE PLANNING  Goal: Discharge to home or other facility with appropriate resources  Description  INTERVENTIONS:  - Identify barriers to discharge w/patient and caregiver  - Arrange for needed discharge resources and transportation as appropriate  - Identify discharge learning needs (meds, wound care, etc )  - Arrange for interpretive services to assist at discharge as needed  - Refer to Case Management Department for coordinating discharge planning if the patient needs post-hospital services based on physician/advanced practitioner order or complex needs related to functional status, cognitive ability, or social support system  Outcome: Progressing     Problem: Knowledge Deficit  Goal: Patient/family/caregiver demonstrates understanding of disease process, treatment plan, medications, and discharge instructions  Description  Complete learning assessment and assess knowledge base    Interventions:  - Provide teaching at level of understanding  - Provide teaching via preferred learning methods  Outcome: Progressing     Problem: CARDIOVASCULAR - ADULT  Goal: Maintains optimal cardiac output and hemodynamic stability  Description  INTERVENTIONS:  - Monitor I/O, vital signs and rhythm  - Monitor for S/S and trends of decreased cardiac output  - Administer and titrate ordered vasoactive medications to optimize hemodynamic stability  - Assess quality of pulses, skin color and temperature  - Assess for signs of decreased coronary artery perfusion  - Instruct patient to report change in severity of symptoms  Outcome: Progressing  Goal: Absence of cardiac dysrhythmias or at baseline rhythm  Description  INTERVENTIONS:  - Continuous cardiac monitoring, vital signs, obtain 12 lead EKG if ordered  - Administer antiarrhythmic and heart rate control medications as ordered  - Monitor electrolytes and administer replacement therapy as ordered  Outcome: Progressing     Problem: RESPIRATORY - ADULT  Goal: Achieves optimal ventilation and oxygenation  Description  INTERVENTIONS:  - Assess for changes in respiratory status  - Assess for changes in mentation and behavior  - Position to facilitate oxygenation and minimize respiratory effort  - Oxygen administered by appropriate delivery if ordered  - Initiate smoking cessation education as indicated  - Encourage broncho-pulmonary hygiene including cough, deep breathe, Incentive Spirometry  - Assess the need for suctioning and aspirate as needed  - Assess and instruct to report SOB or any respiratory difficulty  - Respiratory Therapy support as indicated  Outcome: Progressing     Problem: METABOLIC, FLUID AND ELECTROLYTES - ADULT  Goal: Electrolytes maintained within normal limits  Description  INTERVENTIONS:  - Monitor labs and assess patient for signs and symptoms of electrolyte imbalances  - Administer electrolyte replacement as ordered  - Monitor response to electrolyte replacements, including repeat lab results as appropriate  - Instruct patient on fluid and nutrition as appropriate  Outcome: Progressing  Goal: Fluid balance maintained  Description  INTERVENTIONS:  - Monitor labs   - Monitor I/O and WT  - Instruct patient on fluid and nutrition as appropriate  - Assess for signs & symptoms of volume excess or deficit  Outcome: Progressing  Goal: Glucose maintained within target range  Description  INTERVENTIONS:  - Monitor Blood Glucose as ordered  - Assess for signs and symptoms of hyperglycemia and hypoglycemia  - Administer ordered medications to maintain glucose within target range  - Assess nutritional intake and initiate nutrition service referral as needed  Outcome: Progressing

## 2019-10-22 NOTE — PROGRESS NOTES
Progress Note - Surekha Coad 1970, 51 y o  male MRN: [id number]    Unit/Bed#: [facility] 529-01 Encounter: [id number]    Primary Care Provider: Lio Lawson DO   Date and time admitted to hospital: 10/20/2019  2:21 AM        * Acute respiratory failure with hypoxia [facility])  Assessment & Plan  · Likely secondary to acute pulmonary edema in setting of hypertensive emergency and Jamshid on CKD  · Initially required BiPAP and ICU admission  · Currently on nasal cannula  · Was also started on broad-spectrum antibiotics given high procalcitonin which can be falls in setting of acute kidney injury on CKD  · Continue Bumex drip per Nephrology and Cardiology  · Patient was started on dialysis yesterday  · O2 supplementation as needed to maintain O2 sat above 89%  · Infectious workup so far is negative, will DC antibiotics by tomorrow if the cultures remained negative  · Trend procalcitonin in a m -procalcitonin elevation could be secondary to chronic kidney disease    Hx of right BKA (HCC)  Assessment & Plan  · Known history of  · Supportive care    Acute pulmonary edema (HCC)  Assessment & Plan  · In setting of hypertensive emergency  · Currently on Bumex infusion  · Started on hemodialysis  · Echo on 10/11 showed EF of 52% with hypokinesis, moderate to severe pulmonary hypertension and possible small VSD  · Cardiology on board  Appreciate input  · I&O and daily weight  · O2 supplementation as needed to maintain O2 sat >89%    Chronic anemia  Assessment & Plan  · Baseline hemoglobin between 7-8  · Currently at baseline  · Trend CBC  · Has history of GI bleed  Acute kidney injury superimposed on CKD [facility])  Assessment & Plan  · Status post renal transplant 2001  · Baseline creatinine 4-5  · Creatinine 6 52 today  · Patient was started on hemodialysis assisted by Nephrology  Had another session today    · Patient is currently on Bumex drip    Elevated troponin  Assessment & Plan  Likely non MI elevation in setting of acute pulmonary edema, uncontrolled hypertension and MIKE  Cardiology on board , appreciate input  Hypertension  Assessment & Plan  · Presented with hypertensive  emergency, now better controlled  · Continue with Bumex infusion  · Continue with Toprol-XL 50 mg daily, Procardia XL 90 mg daily, hydralazine 50 mg q 8 hours  · P r n  Labetalol ordered  · Off nitroglycerin drip  Type 1 diabetes mellitus [facility])  Assessment & Plan  Lab Results   Component Value Date    HGBA1C 4 8 10/11/2019       Recent Labs     10/21/19  1634 10/21/19  2050 10/22/19  0639 10/22/19  1207   POCGLU 143* 159* 320* 170*       Blood Sugar Average: Last 72 hrs:  (P) 197 [id number]     Endocrinology on board  Continue with basal bolus regimen  Seven units Lantus at bedtime with 3 units t i d  Lispro for mealtime coverage  Appreciate Endocrinology input  Insulin sliding scale and Accu-Cheks  VTE Pharmacologic Prophylaxis:   Pharmacologic: Heparin  Mechanical VTE Prophylaxis in Place: Yes    Patient Centered Rounds: I have performed bedside rounds with nursing staff today  Discussions with Specialists or Other Care Team Provider:     Education and Discussions with Family / Patient:  Patient    Time Spent for Care: 30 minutes  More than 50% of total time spent on counseling and coordination of care as described above  Current Length of Stay: 2 day(s)    Current Patient Status: Inpatient   Certification Statement: The patient will continue to require additional inpatient hospital stay due to Dialysis    Discharge Plan:     Code Status: Level 1 - Full Code      Subjective:   Called by nursing since the patient believes he had a stroke  When asked about his symptoms he reported that he has bilateral upper extremity shaking  On evaluation patient is alert awake do not appear to have any focal neurological deficits and the shaking is consistent with his uremia    No slurred speech    Objective:     Vitals:   Temp (24hrs), Av 4 °F (36 9 °C), Min:98 °F (36 7 °C), Max:98 8 °F (37 1 °C)    Temp:  [98 °F (36 7 °C)-98 8 °F (37 1 °C)] 98 5 °F (36 9 °C)  HR:  [70-90] 81  Resp:  [16-18] 18  BP: (142-195)/() 195/95  SpO2:  [97 %-100 %] 100 %  Body mass index is 19 72 kg/m²  Input and Output Summary (last 24 hours): Intake/Output Summary (Last 24 hours) at 10/22/2019 1750  Last data filed at 10/22/2019 1455  Gross per 24 hour   Intake 1512 93 ml   Output 2750 ml   Net -1237 07 ml       Physical Exam:     Physical Exam   Constitutional: No distress  HENT:   Head: Normocephalic and atraumatic  Neck: No JVD present  Cardiovascular: Normal rate  No murmur heard  Pulmonary/Chest:   Decreased breath sounds bilateral  Bilateral basal crackles   Abdominal: Soft  He exhibits no distension  Musculoskeletal: Normal range of motion  He exhibits no edema  Right AKA   Neurological: He is alert  No cranial nerve deficit  No motor or sensory deficits on examination       Additional Data:     Labs:    Results from last 7 days   Lab Units 10/22/19  0859   WBC Thousand/uL 10 97*   HEMOGLOBIN g/dL 8 0*   HEMATOCRIT % 25 2*   PLATELETS Thousands/uL 281   NEUTROS PCT % 69   LYMPHS PCT % 17   MONOS PCT % 7   EOS PCT % 5     Results from last 7 days   Lab Units 10/22/19  0859  10/20/19  0409   POTASSIUM mmol/L 4 0   < > 4 3   CHLORIDE mmol/L 97*   < > 99*   CO2 mmol/L 25   < > 31   BUN mg/dL 123*   < > 123*   CREATININE mg/dL 6 52*   < > 5 99*   CALCIUM mg/dL 8 7   < > 10 1   ALK PHOS U/L  --   --  157*   ALT U/L  --   --  42   AST U/L  --   --  38    < > = values in this interval not displayed  Results from last 7 days   Lab Units 10/20/19  0419   INR  1 09       * I Have Reviewed All Lab Data Listed Above  * Additional Pertinent Lab Tests Reviewed:  All Labs For Current Hospital Admission Reviewed    Imaging:    Imaging Reports Reviewed Today Include:   Imaging Personally Reviewed by Myself Includes:      Recent Cultures (last 7 days):     Results from last 7 days   Lab Units 10/20/19  0029 10/20/19  0002   BLOOD CULTURE  No Growth at 48 hrs  No Growth at 48 hrs         Last 24 Hours Medication List:     Current Facility-Administered Medications:  acetaminophen 650 mg Oral Q6H PRN Danyel Hernandez MD    aspirin 81 mg Oral Daily Michelle Tapia PA-C    atorvastatin 80 mg Oral Daily With [facility] EMANUEL Mayfield    azaTHIOprine 50 mg Oral Daily Michelle Tapia PA-C    bumetanide 1 mg/hr Intravenous Continuous Randi DO Melvin Last Rate: 1 mg/hr (10/22/19 0501)   cefepime 1,000 mg Intravenous Q12H Michelle Tapia PA-C Last Rate: 1,000 mg (10/22/19 1213)   docusate sodium 100 mg Oral Daily Rosalva Taylor PA-C    heparin (porcine) 5,000 Units Subcutaneous Q8H [address] Rosalva Tyalor PA-C    hydrALAZINE 50 mg Oral Q8H [address] Brien Cali DO    insulin detemir 7 Units Subcutaneous Early Morning Aubree Lynch MD    insulin lispro 1-5 Units Subcutaneous HS Anjali Garcia MD    insulin lispro 1-5 Units Subcutaneous TID AC Travis Xiong MD    insulin lispro 3 Units Subcutaneous TID With Meals Mavis Angelo MD    Labetalol HCl 10 mg Intravenous Q6H PRN Michelle Tapia PA-C    metoprolol succinate 50 mg Oral Daily Rosalva Taylor PA-C    metroNIDAZOLE 500 mg Intravenous Q8H Michelle Tapia PA-C Last Rate: 500 mg (10/22/19 1715)   NIFEdipine ER 90 mg Oral Daily Tay Del Valle MD    nitroGLYcerin 5-200 mcg/min Intravenous Titrated Michelle Tapia PA-C Last Rate: Stopped (10/20/19 1330)   pantoprazole 40 mg Oral Early Morning Rosalva Taylor PA-C    predniSONE 5 mg Oral Daily Michelle Tapia PA-C    senna 1 tablet Oral HS Michelle Tapia PA-C    sevelamer 800 mg Oral TID With Meals Michelle Tapia PA-C    tacrolimus 2 mg Oral HS Rosalva Taylor PA-C    tacrolimus 3 mg Oral QAM Rosalva Taylor PA-C    vancomycin 15 mg/kg Intravenous Daily PRN Michelle Tapia PA-C Last Rate: 750 mg (10/22/19 1100) Today, Patient Was Seen By: Karrie Talamantes MD    ** Please Note: Dictation voice to text software may have been used in the creation of this document   **

## 2019-10-22 NOTE — ASSESSMENT & PLAN NOTE
· Likely secondary to acute pulmonary edema in setting of hypertensive emergency and Jamshid on CKD  · Initially required BiPAP and ICU admission  · Currently on nasal cannula  · Was also started on broad-spectrum antibiotics given high procalcitonin which can be falls in setting of acute kidney injury on CKD  · Continue Bumex drip per Nephrology and Cardiology  · Patient was started on dialysis yesterday  · O2 supplementation as needed to maintain O2 sat above 89%    · Infectious workup so far is negative, will DC antibiotics by tomorrow if the cultures remained negative  · Trend procalcitonin in a m -procalcitonin elevation could be secondary to chronic kidney disease

## 2019-10-22 NOTE — PROGRESS NOTES
Pt called RN into room to say he thinks he is having a stroke or had a stroke- RN performed assessment and called provider who immediately came to bedside  Neuro assessment normal, no slurred speech, no muscle or cognitive deficits noted  Complete assessment performed and will be repeated in 4 hours per provider

## 2019-10-22 NOTE — SOCIAL WORK
Discussed patient with renal team  Patient will be new start HD-ESRD  The closest unit to his home is [place]  He hopes to be future in home HD but will start at center  Met with patient and his brother to discuss HD  Family will provide transport and patient prefers MWF schedule  Informed them of process for referral to [facility] admissions  CM faced Admission Intake form and clinical records to [facility] at [phone]  Patient is high risk readmission and was given Access To Care and Care Now forms

## 2019-10-22 NOTE — HEMODIALYSIS
Patient completed first 2 hour HD treatment using RIJ tunneled catheter without difficulty  Patient denied cramping, and SOB, and dizziness        Total UF removed:  1300 ml  Pre-weight:  52 7 kg  Post-weight:  51 5 kg

## 2019-10-22 NOTE — ASSESSMENT & PLAN NOTE
· In setting of hypertensive emergency  · Currently on Bumex infusion  · Started on hemodialysis  · Echo on 10/11 showed EF of 52% with hypokinesis, moderate to severe pulmonary hypertension and possible small VSD  · Cardiology on board  Appreciate input  · I&O and daily weight    · O2 supplementation as needed to maintain O2 sat >89%

## 2019-10-22 NOTE — PROGRESS NOTES
Cardiology Progress note  Unit/Bed#: [facility] 529-01 Encounter: [id number]        Shima Fuentes 51 y o  male [id number]  Hospital Stay Days: 2    Assessment and Plan      Current Problem List   Principal Problem:    Acute respiratory failure with hypoxia ([facility] [address] )  Active Problems:    Type 1 diabetes mellitus ([facility] [address] )    Renal transplant, status post    Hypertension    Elevated troponin    Acute kidney injury superimposed on CKD (HCC)    Chronic anemia    Depressed left ventricular ejection fraction    Acute pulmonary edema (HCC)    Hx of right BKA ([facility])    Assessment/Plan:    Acute congestive heart failure:  Etiology most likely due to acute pulmonary edema, fluid overload state consistent with cardiorenal syndrome type 3/4, in setting of S/P renal transplant and chronic kidney disease stage 5  -continue Bumex gtt  -broad-spectrum antibiotics cefepime, Flagyl due to possible underlying pneumonia, elevated procalcitonin and and x-ray findings, per primary team    Hypertension:  Hypertensive urgency on admission, blood pressure has been more controlled 138T/47Y, with systolic bumping to 721 intermittently  -continue Toprol XL 50 mg q day, Procardia 90 mg q day, hydralazine 50 mg q 8  -p r n   Labetalol  -follow-up on secondary hypertension workup    History renal transplant, 2001:  Currently stage 5 chronic kidney disease not on dialysis, PermCath placed yesterday with IR  -eventually will need dialysis, nephrology on case  -immunosuppressed on Prograf, Imuran    History CAD:  S/P stents x2 in February 2017 at [facility]  -continue aspirin, follow up outpatient    Elevated troponin:  Not MI related in the setting of acute pulmonary edema, likely supply/demand troponin leak, currently patient asymptomatic, no chest pain, shortness of breath, palpitations    Diabetes mellitus type 1:  Continue insulin regimen per primary team    Hyperlipidemia:  Recommend changing Lipitor to Crestor to avoid interactions with tacrolimus    Subjective     Patient seen examined at bedside this morning  No overnight events  Feels slightly better, no chest pain, shortness of breath, diaphoresis, nausea/vomiting, fever/chills  Blood pressure has been more controlled  Will go for dialysis today  Objective     Vitals: Temp (24hrs), Av 5 °F (36 9 °C), Min:98 °F (36 7 °C), Max:98 8 °F (37 1 °C)  Current: Temperature: 98 °F (36 7 °C)  Patient Vitals for the past 24 hrs:   BP Temp Temp src Pulse Resp SpO2 Weight   10/22/19 0737 (!) 177/79 98 °F (36 7 °C) Oral 90 17 99 %    10/22/19 0539 (!) 176/79         10/22/19 0301       52 1 kg (114 lb 14 4 oz)   10/22/19 0147 162/74 98 8 °F (37 1 °C)  85 18 99 %    10/21/19 2321 151/70 98 8 °F (37 1 °C)  86 18 98 %    10/21/19 2139 144/66         10/21/19 2000 142/65   83 16 97 %    10/21/19 1900 152/70 98 2 °F (36 8 °C) Oral 83 16 98 %    10/21/19 1700 125/55   76 16 100 %    10/21/19 1635 115/58   77 18 98 %    10/21/19 1600 110/57   77 16 98 %    10/21/19 1540 119/59   82 20 97 %    10/21/19 1528 115/58   83 18 97 %    10/21/19 1506 117/59   84 14 98 %    10/21/19 1431 123/60   90 16 95 %    10/21/19 1426 125/62   91 16 95 %    10/21/19 1421 122/63   86 16 95 %    10/21/19 1416 130/65   85 16 96 %    10/21/19 1412    84 16 96 %    10/21/19 1222 170/79         10/21/19 1116 (!) 232/98 98 6 °F (37 °C) Oral 85 16      Body mass index is 19 72 kg/m²  Physical Exam:  GENERAL: NAD  HEENT:  NC/AT, PERRL, EOMI, no scleral icterus  CARDIAC:  RRR, +S1/S2, no S3/S4 heard, no m/g/r  PULMONARY:  Rales bilaterally lower bases, improved since yesterday  ABDOMEN:  Soft, NT/ND,no rebound/guarding/rigidity  Extremities:  Above the knee amputation right, no edema, cyanosis, or clubbing  NEUROLOGIC: Grossly intact       Invasive Devices     Peripheral Intravenous Line            Peripheral IV 10/20/19 Left Hand 1 day    Peripheral IV 10/20/19 Right Forearm 1 day          Hemodialysis Catheter            Permanent HD Catheter  less than 1 day                    Labs:   Results from last 7 days   Lab Units 10/21/19  0511 10/20/19  1628 10/20/19  0419 10/19/19  2300 10/18/19  0805   WBC Thousand/uL 10 00  --  10 90* 15 39* 11 94*   HEMOGLOBIN g/dL 7 6* 7 8* 7 3* 9 6* 8 4*   HEMATOCRIT % 23 9* 24 0* 22 8* 29 9* 26 8*   PLATELETS Thousands/uL 281  --  299 472* 318   NEUTROS PCT % 64  --  74 69 68   MONOS PCT % 9  --  10 8 8    Results from last 7 days   Lab Units 10/21/19  0511 10/20/19  1002 10/20/19  0419 10/20/19  0409 10/19/19  2300 10/18/19  0805   SODIUM mmol/L 137  --   --  139 137 142   POTASSIUM mmol/L 4 2  --   --  4 3 3 4* 4 6   CHLORIDE mmol/L 99*  --   --  99* 111* 102   CO2 mmol/L 28  --   --  31 18* 29   BUN mg/dL 108*  --   --  123* 78* 116*   CREATININE mg/dL 6 01*  --   --  5 99* 3 44* 5 62*   CALCIUM mg/dL 9 1  --   --  10 1 5 4* 9 0   ALK PHOS U/L  --   --   --  157* 131*  --    ALT U/L  --   --   --  42 26  --    AST U/L  --   --   --  38 55*  --    TROPONIN I ng/mL  --  0 11* 0 11*  --  0 05*  --    MAGNESIUM mg/dL  --   --   --  2 3 2 3  --    PHOSPHORUS mg/dL  --   --   --  7 1*  --   --    INR   --   --  1 09  --  0 93  --    PTT seconds  --   --  30  --  31  --    EGFR ml/min/1 73sq m 10  --   --  10 20 11     Results from last 7 days   Lab Units 10/20/19  0419 10/19/19  2300   INR  1 09 0 93   PTT seconds 30 31     Results from last 7 days   Lab Units 10/20/19  0419   LACTIC ACID mmol/L 1 3     Results from last 7 days   Lab Units 10/20/19  1002 10/20/19  0419 10/19/19  2300   TROPONIN I ng/mL 0 11* 0 11* 0 05*     No results found for: PHART, LCR2FHE, PO2ART, GKO0BYO, A1WPXCEM, BEART, SOURCE  No components found for: HIV1X2  Lab Results   Component Value Date    HEPAIGM Non-Reactive (q 12/07/2014    HEPBIGM Non-reactive 10/21/2019    HEPBCAB Non-reactive 10/21/2019    HEPCAB Non-reactive 10/21/2019     No results found for: SPEP, UPEP Lab Results   Component Value Date    HGBA1C 4 8 10/11/2019    HGBA1C 6 3 06/29/2019    HGBA1C 7 0 (H) 09/24/2018     Lab Results   Component Value Date    CHOL 105 08/18/2015    Lab Results   Component Value Date    HDL 37 (L) 02/15/2019    HDL 41 06/23/2018    HDL 20 (L) 09/24/2017    Lab Results   Component Value Date    LDLCALC 41 02/15/2019    LDLCALC 41 06/23/2018    LDLCALC 25 09/24/2017    Lab Results   Component Value Date    TRIG 86 02/15/2019    TRIG 120 06/23/2018    TRIG 80 09/24/2017     No components found for: PROCAL      Micro:  Results from last 7 days   Lab Units 10/20/19  0029 10/20/19  0002   BLOOD CULTURE  No Growth at 24 hrs  No Growth at 24 hrs  Urinalysis:  No results found for: AMPHETUR, BDZUR, COCAINEUR, OPIATEUR, PCPUR, THCUR, ETOH, ACTMNPHEN, SALICYLATE       Invalid input(s): URIBILINOGEN        Intake and Outputs:  I/O       10/20 0701 - 10/21 0700 10/21 0701 - 10/22 0700 10/22 0701 - 10/23 0700    P  O  0 440     I V  (mL/kg) 562 7 (10 4) 124 9 (2 4)     IV Piggyback 500 150     Total Intake(mL/kg) 1062 7 (19 6) 714 9 (13 7)     Urine (mL/kg/hr) 1650 (1 3) 1700 (1 4)     Total Output 1650 1700     Net -587 3 -985  1                Nutrition:  Diet Ramirez/CHO Controlled; Consistent Carbohydrate Diet Level 2 (5 carb servings/75 grams CHO/meal);  Consistent Carbohydrate Diet Level 2 (5 carb servings/75 grams CHO/meal), Sodium 2 GM  Radiology Results:   IR permacath placement   Final Result by Miguel Collins MD (10/21 4500)   Impression: Successful image guided placement of tunneled central venous hemodialysis catheter         Workstation performed: [id number]           Scheduled Medications:    aspirin 81 mg Daily   atorvastatin 80 mg Daily With Dinner   azaTHIOprine 50 mg Daily   cefepime 1,000 mg Q12H   docusate sodium 100 mg Daily   heparin (porcine) 5,000 Units Q8H [facility]   hydrALAZINE 50 mg Q8H [facility]   insulin detemir 7 Units Early Morning   insulin lispro 1-5 Units HS   insulin lispro 1-5 Units TID AC   insulin lispro 3 Units TID With Meals   metoprolol succinate 50 mg Daily   metroNIDAZOLE 500 mg Q8H   NIFEdipine ER 90 mg Daily   pantoprazole 40 mg Early Morning   predniSONE 5 mg Daily   senna 1 tablet HS   sevelamer 800 mg TID With Meals   tacrolimus 2 mg HS   tacrolimus 3 mg QAM     PRN MEDS:    acetaminophen 650 mg Q6H PRN   albuterol 2 5 mg Q4H PRN   Labetalol HCl 10 mg Q6H PRN   vancomycin 15 mg/kg Daily PRN     Last 24 Hour Meds: :   Medication Administration - last 24 hours from 10/21/2019 0741 to 10/22/2019 0741       Date/Time Order Dose Route Action Action by     10/21/2019 0844 aspirin chewable tablet 81 mg 81 mg Oral Given [facility], RN     10/21/2019 1740 atorvastatin (LIPITOR) tablet 80 mg 80 mg Oral Given [facility], RN     10/22/2019 0539 pantoprazole (PROTONIX) EC tablet 40 mg 40 mg Oral Given [facility], RN     10/21/2019 0844 predniSONE tablet 5 mg 5 mg Oral Given [facility], RN     10/21/2019 1740 sevelamer (RENAGEL) tablet 800 mg 800 mg Oral Given [facility], RN     10/21/2019 1119 sevelamer (RENAGEL) tablet 800 mg 0 mg Oral Hold [facility], RN     10/21/2019 0844 azaTHIOprine (IMURAN) tablet 50 mg 50 mg Oral Given [facility], RN     10/22/2019 0538 heparin (porcine) subcutaneous injection 5,000 Units 5,000 Units Subcutaneous Given [facility], RN     10/21/2019 2139 heparin (porcine) subcutaneous injection 5,000 Units 5,000 Units Subcutaneous Given [facility], RN     10/21/2019 1321 heparin (porcine) subcutaneous injection 5,000 Units 0 Units Subcutaneous Hold [facility], RN     10/21/2019 0846 tacrolimus (PROGRAF) capsule 3 mg 3 mg Oral Given [place], RN     10/21/2019 2139 tacrolimus (PROGRAF) capsule 2 mg 2 mg Oral Given [facility], RN     10/21/2019 0844 metoprolol succinate (TOPROL-XL) 24 hr tablet 50 mg 50 mg Oral Given [place], RN     10/21/2019 0844 NIFEdipine (PROCARDIA XL) 24 hr tablet 60 mg 60 mg Oral Given Eliz Valentine RN     10/22/2019 0147 metroNIDAZOLE (FLAGYL) IVPB (premix) 500 mg 0 mg Intravenous Stopped [place] RN     10/22/2019 0103 metroNIDAZOLE (FLAGYL) IVPB (premix) 500 mg 500 mg Intravenous [address] Jose [place], [address]     10/21/2019 1744 metroNIDAZOLE (FLAGYL) IVPB (premix) 500 mg 500 mg Intravenous [address] Tania [place] RN     10/21/2019 4449 metroNIDAZOLE (FLAGYL) IVPB (premix) 500 mg 500 mg Intravenous 600 [facility], [address] [facility]     10/21/2019 2138 senna (SENOKOT) tablet 8 6 mg 8 6 mg Oral Not Given [place] RN     10/21/2019 0844 docusate sodium (COLACE) capsule 100 mg 100 mg Oral Given Eliz Valentine RN     10/21/2019 1152 furosemide (LASIX) 500 mg infusion 50 mL 0 mg/hr Intravenous Stopped Eliz Valentine RN     10/22/2019 0055 cefepime (MAXIPIME) 1,000 mg in dextrose 5 % 50 mL IVPB 0 mg Intravenous Stopped [place] RN     10/22/2019 0018 cefepime (MAXIPIME) 1,000 mg in dextrose 5 % 50 mL IVPB 1,000 mg Intravenous [address] [place], [place]     10/21/2019 1216 cefepime (MAXIPIME) 1,000 mg in dextrose 5 % 50 mL IVPB 1,000 mg Intravenous [address] Eliz Valentine RN     10/21/2019 1117 Labetalol HCl (NORMODYNE) injection 10 mg 10 mg Intravenous Given Eliz Valentine RN     10/22/2019 0645 insulin detemir (LEVEMIR) subcutaneous injection 7 Units 7 Units Subcutaneous Given [place] RN     10/22/2019 0537 insulin detemir (LEVEMIR) subcutaneous injection 7 Units   Subcutaneous Canceled Entry [place] RN     10/21/2019 2139 insulin lispro (HumaLOG) 100 units/mL subcutaneous injection 1-5 Units 1 Units Subcutaneous Given [place] RN     10/21/2019 1742 insulin lispro (HumaLOG) 100 units/mL subcutaneous injection 3 Units 3 Units Subcutaneous Given [place]ania [place]la RN     10/21/2019 1216 insulin lispro (HumaLOG) 100 units/mL subcutaneous injection 3 Units 3 Units Subcutaneous Given Eliz Valentine RN     10/21/2019 1739 insulin lispro (HumaLOG) 100 units/mL subcutaneous injection 1-5 Units 0 Units Subcutaneous Hold Bo Ruth, RN     10/21/2019 1216 insulin lispro (HumaLOG) 100 units/mL subcutaneous injection 1-5 Units 1 Units Subcutaneous Given [facility], RN     10/21/2019 3668 insulin lispro (HumaLOG) 100 units/mL subcutaneous injection 1-5 Units 2 Units Subcutaneous Given [facility], RN     10/22/2019 0501 bumetanide (BUMEX) 12 5 mg infusion 50 mL 1 mg/hr Intravenous [address] [place], RN     10/22/2019 0500 bumetanide (BUMEX) 12 5 mg infusion 50 mL 0 mg/hr Intravenous Stopped [facility], RN     10/21/2019 1146 bumetanide (BUMEX) 12 5 mg infusion 50 mL 1 mg/hr Intravenous 600 [facility], [address] [facility]     10/22/2019 0539 hydrALAZINE (APRESOLINE) tablet 50 mg 50 mg Oral Given [facility], RN     10/21/2019 2139 hydrALAZINE (APRESOLINE) tablet 50 mg 50 mg Oral Given [facility], RN     10/21/2019 1310 hydrALAZINE (APRESOLINE) tablet 50 mg 50 mg Oral Given [facility], RN     10/21/2019 1417 midazolam (VERSED) injection 0 5 mg Intravenous Given Alba Board, RN     10/21/2019 1417 fentanyl citrate (PF) 100 MCG/2ML 25 mcg Intravenous Given Alba Board, RN     10/21/2019 1420 ceFAZolin (ANCEF) IVPB (premix) 1,000 mg Intravenous [facility], [address] [facility]     10/22/2019 0045 acetaminophen (TYLENOL) tablet 650 mg 650 mg Oral Given [facility], RN     10/21/2019 1940 acetaminophen (TYLENOL) tablet 650 mg 650 mg Oral Given [facility], RN          PLEASE NOTE:  This encounter was completed utilizing the [employer] Direct Speech Voice Recognition Software  Grammatical errors, random word insertions, pronoun errors and incomplete sentences are occasional consequences of the system due to software limitations, ambient noise and hardware issues  These may be missed by proof reading prior to affixing electronic signature   Any questions or concerns about the content, text or information contained within the body of this dictation should be directly addressed to the physician for clarification  Please do not hesitate to call me directly if you have any any questions or concerns        DO Mohini Jerome 73 Internal Medicine PGY-1

## 2019-10-23 ENCOUNTER — APPOINTMENT (INPATIENT)
Dept: DIALYSIS | Facility: HOSPITAL | Age: 49
DRG: 291 | End: 2019-10-23
Attending: INTERNAL MEDICINE
Payer: COMMERCIAL

## 2019-10-23 LAB
ANION GAP SERPL CALCULATED.3IONS-SCNC: 10 MMOL/L (ref 4–13)
BUN SERPL-MCNC: 65 MG/DL (ref 5–25)
CALCIUM SERPL-MCNC: 8.5 MG/DL (ref 8.3–10.1)
CHLORIDE SERPL-SCNC: 100 MMOL/L (ref 100–108)
CO2 SERPL-SCNC: 25 MMOL/L (ref 21–32)
CREAT SERPL-MCNC: 4.5 MG/DL (ref 0.6–1.3)
ERYTHROCYTE [DISTWIDTH] IN BLOOD BY AUTOMATED COUNT: 15.5 % (ref 11.6–15.1)
GFR SERPL CREATININE-BSD FRML MDRD: 14 ML/MIN/1.73SQ M
GLUCOSE SERPL-MCNC: 124 MG/DL (ref 65–140)
GLUCOSE SERPL-MCNC: 197 MG/DL (ref 65–140)
GLUCOSE SERPL-MCNC: 208 MG/DL (ref 65–140)
GLUCOSE SERPL-MCNC: 236 MG/DL (ref 65–140)
GLUCOSE SERPL-MCNC: 244 MG/DL (ref 65–140)
GLUCOSE SERPL-MCNC: 280 MG/DL (ref 65–140)
HCT VFR BLD AUTO: 25 % (ref 36.5–49.3)
HGB BLD-MCNC: 7.9 G/DL (ref 12–17)
MCH RBC QN AUTO: 29.9 PG (ref 26.8–34.3)
MCHC RBC AUTO-ENTMCNC: 31.6 G/DL (ref 31.4–37.4)
MCV RBC AUTO: 95 FL (ref 82–98)
PLATELET # BLD AUTO: 262 THOUSANDS/UL (ref 149–390)
PMV BLD AUTO: 8.9 FL (ref 8.9–12.7)
POTASSIUM SERPL-SCNC: 4.2 MMOL/L (ref 3.5–5.3)
RBC # BLD AUTO: 2.64 MILLION/UL (ref 3.88–5.62)
SODIUM SERPL-SCNC: 135 MMOL/L (ref 136–145)
VANCOMYCIN SERPL-MCNC: 24.1 UG/ML
WBC # BLD AUTO: 9.41 THOUSAND/UL (ref 4.31–10.16)

## 2019-10-23 PROCEDURE — 5A1D70Z PERFORMANCE OF URINARY FILTRATION, INTERMITTENT, LESS THAN 6 HOURS PER DAY: ICD-10-PCS | Performed by: INTERNAL MEDICINE

## 2019-10-23 PROCEDURE — NC001 PR NO CHARGE: Performed by: NURSE PRACTITIONER

## 2019-10-23 PROCEDURE — 99232 SBSQ HOSP IP/OBS MODERATE 35: CPT | Performed by: FAMILY MEDICINE

## 2019-10-23 PROCEDURE — 90935 HEMODIALYSIS ONE EVALUATION: CPT | Performed by: INTERNAL MEDICINE

## 2019-10-23 PROCEDURE — 82948 REAGENT STRIP/BLOOD GLUCOSE: CPT

## 2019-10-23 PROCEDURE — 85027 COMPLETE CBC AUTOMATED: CPT | Performed by: FAMILY MEDICINE

## 2019-10-23 PROCEDURE — 80048 BASIC METABOLIC PNL TOTAL CA: CPT | Performed by: FAMILY MEDICINE

## 2019-10-23 PROCEDURE — 99232 SBSQ HOSP IP/OBS MODERATE 35: CPT | Performed by: INTERNAL MEDICINE

## 2019-10-23 PROCEDURE — 80202 ASSAY OF VANCOMYCIN: CPT | Performed by: FAMILY MEDICINE

## 2019-10-23 RX ORDER — CARVEDILOL 6.25 MG/1
6.25 TABLET ORAL 2 TIMES DAILY WITH MEALS
Status: DISCONTINUED | OUTPATIENT
Start: 2019-10-23 | End: 2019-10-25 | Stop reason: HOSPADM

## 2019-10-23 RX ORDER — LABETALOL 20 MG/4 ML (5 MG/ML) INTRAVENOUS SYRINGE
10 ONCE
Status: COMPLETED | OUTPATIENT
Start: 2019-10-23 | End: 2019-10-23

## 2019-10-23 RX ORDER — CLONIDINE HYDROCHLORIDE 0.1 MG/1
0.1 TABLET ORAL ONCE
Status: COMPLETED | OUTPATIENT
Start: 2019-10-23 | End: 2019-10-23

## 2019-10-23 RX ORDER — CARVEDILOL 6.25 MG/1
6.25 TABLET ORAL 2 TIMES DAILY WITH MEALS
Status: DISCONTINUED | OUTPATIENT
Start: 2019-10-23 | End: 2019-10-23

## 2019-10-23 RX ORDER — AMLODIPINE BESYLATE 10 MG/1
10 TABLET ORAL ONCE
Status: COMPLETED | OUTPATIENT
Start: 2019-10-23 | End: 2019-10-23

## 2019-10-23 RX ADMIN — CLONIDINE HYDROCHLORIDE 0.1 MG: 0.1 TABLET ORAL at 00:39

## 2019-10-23 RX ADMIN — SEVELAMER HYDROCHLORIDE 800 MG: 800 TABLET, FILM COATED PARENTERAL at 17:46

## 2019-10-23 RX ADMIN — CEFEPIME HYDROCHLORIDE 1000 MG: 1 INJECTION, POWDER, FOR SOLUTION INTRAMUSCULAR; INTRAVENOUS at 15:38

## 2019-10-23 RX ADMIN — AMLODIPINE BESYLATE 10 MG: 10 TABLET ORAL at 05:19

## 2019-10-23 RX ADMIN — NIFEDIPINE 90 MG: 60 TABLET, FILM COATED, EXTENDED RELEASE ORAL at 08:32

## 2019-10-23 RX ADMIN — INSULIN LISPRO 3 UNITS: 100 INJECTION, SOLUTION INTRAVENOUS; SUBCUTANEOUS at 08:35

## 2019-10-23 RX ADMIN — PANTOPRAZOLE SODIUM 40 MG: 40 TABLET, DELAYED RELEASE ORAL at 05:19

## 2019-10-23 RX ADMIN — AZATHIOPRINE 50 MG: 50 TABLET ORAL at 08:31

## 2019-10-23 RX ADMIN — INSULIN LISPRO 3 UNITS: 100 INJECTION, SOLUTION INTRAVENOUS; SUBCUTANEOUS at 12:08

## 2019-10-23 RX ADMIN — INSULIN LISPRO 2 UNITS: 100 INJECTION, SOLUTION INTRAVENOUS; SUBCUTANEOUS at 06:49

## 2019-10-23 RX ADMIN — METRONIDAZOLE 500 MG: 500 INJECTION, SOLUTION INTRAVENOUS at 01:20

## 2019-10-23 RX ADMIN — LABETALOL 20 MG/4 ML (5 MG/ML) INTRAVENOUS SYRINGE 10 MG: at 02:08

## 2019-10-23 RX ADMIN — INSULIN DETEMIR 7 UNITS: 100 INJECTION, SOLUTION SUBCUTANEOUS at 05:19

## 2019-10-23 RX ADMIN — INSULIN LISPRO 1 UNITS: 100 INJECTION, SOLUTION INTRAVENOUS; SUBCUTANEOUS at 21:19

## 2019-10-23 RX ADMIN — METOPROLOL SUCCINATE 50 MG: 50 TABLET, EXTENDED RELEASE ORAL at 08:31

## 2019-10-23 RX ADMIN — METRONIDAZOLE 500 MG: 500 INJECTION, SOLUTION INTRAVENOUS at 11:00

## 2019-10-23 RX ADMIN — CLONIDINE HYDROCHLORIDE 0.1 MG: 0.1 TABLET ORAL at 03:09

## 2019-10-23 RX ADMIN — HYDRALAZINE HYDROCHLORIDE 50 MG: 50 TABLET ORAL at 05:19

## 2019-10-23 RX ADMIN — TACROLIMUS 2 MG: 1 CAPSULE ORAL at 21:20

## 2019-10-23 RX ADMIN — ASPIRIN 81 MG 81 MG: 81 TABLET ORAL at 08:30

## 2019-10-23 RX ADMIN — HEPARIN SODIUM 5000 UNITS: 5000 INJECTION INTRAVENOUS; SUBCUTANEOUS at 21:21

## 2019-10-23 RX ADMIN — PREDNISONE 5 MG: 5 TABLET ORAL at 08:30

## 2019-10-23 RX ADMIN — ATORVASTATIN CALCIUM 80 MG: 80 TABLET, FILM COATED ORAL at 17:45

## 2019-10-23 RX ADMIN — SEVELAMER HYDROCHLORIDE 800 MG: 800 TABLET, FILM COATED PARENTERAL at 08:31

## 2019-10-23 RX ADMIN — INSULIN LISPRO 2 UNITS: 100 INJECTION, SOLUTION INTRAVENOUS; SUBCUTANEOUS at 12:08

## 2019-10-23 RX ADMIN — METRONIDAZOLE 500 MG: 500 INJECTION, SOLUTION INTRAVENOUS at 17:46

## 2019-10-23 RX ADMIN — LABETALOL 20 MG/4 ML (5 MG/ML) INTRAVENOUS SYRINGE 10 MG: at 05:19

## 2019-10-23 RX ADMIN — TACROLIMUS 3 MG: 1 CAPSULE ORAL at 08:31

## 2019-10-23 RX ADMIN — INSULIN LISPRO 3 UNITS: 100 INJECTION, SOLUTION INTRAVENOUS; SUBCUTANEOUS at 17:45

## 2019-10-23 RX ADMIN — SEVELAMER HYDROCHLORIDE 800 MG: 800 TABLET, FILM COATED PARENTERAL at 11:03

## 2019-10-23 RX ADMIN — SENNOSIDES 8.6 MG: 8.6 TABLET, FILM COATED ORAL at 21:17

## 2019-10-23 RX ADMIN — CEFEPIME HYDROCHLORIDE 1000 MG: 1 INJECTION, POWDER, FOR SOLUTION INTRAMUSCULAR; INTRAVENOUS at 00:39

## 2019-10-23 RX ADMIN — DOCUSATE SODIUM 100 MG: 100 CAPSULE, LIQUID FILLED ORAL at 08:30

## 2019-10-23 RX ADMIN — HEPARIN SODIUM 5000 UNITS: 5000 INJECTION INTRAVENOUS; SUBCUTANEOUS at 05:20

## 2019-10-23 NOTE — PROGRESS NOTES
Cardiology Progress note  Unit/Bed#: [facility] 529-01 Encounter: [id number]        Surekha Coad 51 y o  male [id number]  Hospital Stay Days: 3    Assessment and Plan      Current Problem List   Principal Problem:    Acute respiratory failure with hypoxia ([facility] [address] )  Active Problems:    Type 1 diabetes mellitus ([facility] [address] )    Renal transplant, status post    Hypertension    Elevated troponin    Acute kidney injury superimposed on CKD (HCC)    Chronic anemia    Depressed left ventricular ejection fraction    Acute pulmonary edema (HCC)    Hx of right BKA ([facility])    Assessment/Plan:    Hypertension:  Hypertensive urgency on admission, blood pressure elevated overnight needing p r n  Labetalol as well as Catapres x2, amlodipine 10 mg 1x dose given    -on Toprol XL 50 mg q day, Procardia 90 mg q day, hydralazine 50 mg q 8  -p r n   Labetalol 10 mg q 6h  -consider switching Toprol XL to Coreg 6 25 mg bid  -will monitor patient after hemodialysis today, -3L since admission, -1 3L HD yesterday    Acute congestive heart failure:   improved, etiology most likely due to acute pulmonary edema, fluid overload state consistent with cardiorenal syndrome type 3/4, in setting of S/P renal transplant and chronic kidney disease stage 5    -off Bumex gtt, hemodialysis planned for today  -broad-spectrum antibiotics for possible underlying pneumonia      History renal transplant, 2001:  Currently stage 5 chronic kidney disease not on dialysis, PermCath placed yesterday with IR    -dialysis yesterday will most likely go today as well, nephrology on case  -immunosuppressed on Prograf, Imuran, prednisone     History CAD:  S/P stents x2 in February 2017 at [facility]  -continue aspirin/statin, follow up outpatient     Elevated troponin:  Not MI related in the setting of acute pulmonary edema, likely supply/demand troponin leak, currently patient asymptomatic, no chest pain, shortness of breath, palpitations     Diabetes mellitus type 1:  Continue insulin regimen per primary team     Hyperlipidemia:  Recommend changing Lipitor to Crestor to avoid interactions with tacrolimus    Subjective     Patient seen examined at bedside this morning  Overnight blood pressure elevated, p r n  Labetalol given as well as scheduled hydralazine with blood pressure still 170s/90s  Catapres x2, amlodipine 10 mg x1 with mild improvement  Patient notes this morning at this time did not have any shortness of breath, chest pain but felt diaphoretic at this time  This morning no complaints, no symptoms  Understands that he will be going for dialysis again today  Last blood pressure reading 164/89  Objective     Vitals: Temp (24hrs), Av 4 °F (36 9 °C), Min:98 °F (36 7 °C), Max:98 9 °F (37 2 °C)  Current: Temperature: 98 °F (36 7 °C)  Patient Vitals for the past 24 hrs:   BP Temp Temp src Pulse Resp SpO2   10/23/19 0735 164/89 98 °F (36 7 °C) Oral 79 18 100 %   10/23/19 0637 (!) 192/86   72     10/23/19 0435 (!) 218/94        10/23/19 0352 (!) 214/95 98 4 °F (36 9 °C) Oral 77 18 100 %   10/23/19 0248 (!) 201/88   78  98 %   10/23/19 0202 (!) 195/92   77     10/22/19 2338 (!) 174/94 98 9 °F (37 2 °C) Oral 79 18 99 %   10/22/19 2141 (!) 185/72        10/22/19 1930 (!) 191/82 98 4 °F (36 9 °C) Oral 77 20 96 %   10/22/19 1500 (!) 195/95 98 5 °F (36 9 °C) Oral 81 18 100 %   10/22/19 1238      100 %   10/22/19 1200 (!) 193/91 98 7 °F (37 1 °C) Oral 77 17 100 %   10/22/19 1115 170/93 98 °F (36 7 °C) Oral 70 16    10/22/19 1100 (!) 177/103   77 18    10/22/19 1030 152/89   78 18    10/22/19 1002 154/87   79 18    10/22/19 0930 155/82   82 18     Body mass index is 19 72 kg/m²  Physical Exam:  GENERAL: NAD  HEENT:  NC/AT, PERRL, EOMI, no scleral icterus  CARDIAC:  RRR, +S1/S2, no S3/S4 heard, no m/g/r  PULMONARY:  Mild rales bilaterally lower bases, non-labored breathing  ABDOMEN:  Soft, NT/ND,no rebound/guarding/rigidity  Extremities:   No edema, cyanosis, or clubbing, above the knee amputation right side  NEUROLOGIC: Grossly intact  SKIN:  No rashes or erythema noted on exposed skin     Psych: Normal affect    Invasive Devices     Peripheral Intravenous Line            Peripheral IV 10/20/19 Left Hand 3 days    Peripheral IV 10/20/19 Right Forearm 2 days          Hemodialysis Catheter            Permanent HD Catheter  1 day                    Labs:   Results from last 7 days   Lab Units 10/23/19  0515 10/22/19  0859 10/21/19  0511 10/20/19  1628 10/20/19  0419 10/19/19  2300 10/18/19  0805   WBC Thousand/uL 9 41 10 97* 10 00  --  10 90* 15 39* 11 94*   HEMOGLOBIN g/dL 7 9* 8 0* 7 6* 7 8* 7 3* 9 6* 8 4*   HEMATOCRIT % 25 0* 25 2* 23 9* 24 0* 22 8* 29 9* 26 8*   PLATELETS Thousands/uL 262 281 281  --  299 472* 318   NEUTROS PCT %  --  69 64  --  74 69 68   MONOS PCT %  --  7 9  --  10 8 8      Results from last 7 days   Lab Units 10/23/19  0515 10/22/19  0859 10/21/19  0511 10/20/19  1002 10/20/19  0419 10/20/19  0409 10/19/19  2300 10/18/19  0805   SODIUM mmol/L 135* 133* 137  --   --  139 137 142   POTASSIUM mmol/L 4 2 4 0 4 2  --   --  4 3 3 4* 4 6   CHLORIDE mmol/L 100 97* 99*  --   --  99* 111* 102   CO2 mmol/L 25 25 28  --   --  31 18* 29   BUN mg/dL 65* 123* 108*  --   --  123* 78* 116*   CREATININE mg/dL 4 50* 6 52* 6 01*  --   --  5 99* 3 44* 5 62*   CALCIUM mg/dL 8 5 8 7 9 1  --   --  10 1 5 4* 9 0   ALK PHOS U/L  --   --   --   --   --  157* 131*  --    ALT U/L  --   --   --   --   --  42 26  --    AST U/L  --   --   --   --   --  38 55*  --    TROPONIN I ng/mL  --   --   --  0 11* 0 11*  --  0 05*  --    MAGNESIUM mg/dL  --   --   --   --   --  2 3 2 3  --    PHOSPHORUS mg/dL  --   --   --   --   --  7 1*  --   --    INR   --   --   --   --  1 09  --  0 93  --    PTT seconds  --   --   --   --  30  --  31  --    EGFR ml/min/1 73sq m 14 9 10  --   --  10 20 11     Results from last 7 days   Lab Units 10/20/19  1798 10/19/19  8260   INR 1  09 0 93   PTT seconds 30 31     Results from last 7 days   Lab Units 10/20/19  0419   LACTIC ACID mmol/L 1 3     Results from last 7 days   Lab Units 10/20/19  1002 10/20/19  0419 10/19/19  2300   TROPONIN I ng/mL 0 11* 0 11* 0 05*     No results found for: PHART, HDB0WRI, PO2ART, LXH2XGY, D7XNYHHW, BEART, SOURCE  No components found for: HIV1X2  Lab Results   Component Value Date    HEPAIGM Non-Reactive (q 12/07/2014    HEPBIGM Non-reactive 10/21/2019    HEPBCAB Non-reactive 10/21/2019    HEPCAB Non-reactive 10/21/2019     No results found for: SPEP, UPEP   Lab Results   Component Value Date    HGBA1C 4 8 10/11/2019    HGBA1C 6 3 06/29/2019    HGBA1C 7 0 (H) 09/24/2018     Lab Results   Component Value Date    CHOL 105 08/18/2015      Lab Results   Component Value Date    HDL 37 (L) 02/15/2019    HDL 41 06/23/2018    HDL 20 (L) 09/24/2017      Lab Results   Component Value Date    LDLCALC 41 02/15/2019    LDLCALC 41 06/23/2018    LDLCALC 25 09/24/2017      Lab Results   Component Value Date    TRIG 86 02/15/2019    TRIG 120 06/23/2018    TRIG 80 09/24/2017     No components found for: PROCAL      Micro:  Results from last 7 days   Lab Units 10/20/19  0029 10/20/19  0002   BLOOD CULTURE  No Growth at 48 hrs  No Growth at 48 hrs  Urinalysis:  No results found for: AMPHETUR, BDZUR, COCAINEUR, OPIATEUR, PCPUR, THCUR, ETOH, ACTMNPHEN, SALICYLATE       Invalid input(s): URIBILINOGEN        Intake and Outputs:  I/O       10/21 0701 - 10/22 0700 10/22 0701 - 10/23 0700 10/23 0701 - 10/24 0700    P  O  440 418     I V  (mL/kg) 124 9 (2 4) 500 (9 6)     IV Piggyback 150      Total Intake(mL/kg) 714 9 (13 7) 918 (17 6)     Urine (mL/kg/hr) 1700 (1 4) 2700 (2 2)     Other  1300     Total Output 1700 4000     Net -985 1 -3082                Nutrition:  Diet Ramirez/CHO Controlled; Consistent Carbohydrate Diet Level 2 (5 carb servings/75 grams CHO/meal);  Consistent Carbohydrate Diet Level 2 (5 carb servings/75 grams CHO/meal), Sodium 2 GM  Radiology Results:   IR permacath placement   Final Result by Miguel Collins MD (10/21 8671)   Impression: Successful image guided placement of tunneled central venous hemodialysis catheter         Workstation performed: [id number]           Scheduled Medications:    aspirin 81 mg Daily   atorvastatin 80 mg Daily With Dinner   azaTHIOprine 50 mg Daily   cefepime 1,000 mg Q12H   docusate sodium 100 mg Daily   heparin (porcine) 5,000 Units Q8H [address]   hydrALAZINE 50 mg Q8H [address]   insulin detemir 7 Units Early Morning   insulin lispro 1-5 Units HS   insulin lispro 1-5 Units TID AC   insulin lispro 3 Units TID With Meals   metoprolol succinate 50 mg Daily   metroNIDAZOLE 500 mg Q8H   NIFEdipine ER 90 mg Daily   pantoprazole 40 mg Early Morning   predniSONE 5 mg Daily   senna 1 tablet HS   sevelamer 800 mg TID With Meals   tacrolimus 2 mg HS   tacrolimus 3 mg QAM     PRN MEDS:    acetaminophen 650 mg Q6H PRN   Labetalol HCl 10 mg Q6H PRN   vancomycin 15 mg/kg Daily PRN     Last 24 Hour Meds: :   Medication Administration - last 24 hours from 10/22/2019 0916 to 10/23/2019 9086       Date/Time Order Dose Route Action Action by     10/23/2019 0830 aspirin chewable tablet 81 mg 81 mg Oral Given Teresa Lawrence RN     10/22/2019 0948 aspirin chewable tablet 81 mg 0 mg Oral Hold Emi Fernandez RN     10/22/2019 1715 atorvastatin (LIPITOR) tablet 80 mg 80 mg Oral Given Emi Fernandez RN     10/23/2019 0519 pantoprazole (PROTONIX) EC tablet 40 mg 40 mg Oral Given [facility] GERMAN Wong     10/23/2019 0830 predniSONE tablet 5 mg 5 mg Oral Given Teresa Lawrence RN     10/22/2019 0950 predniSONE tablet 5 mg 0 mg Oral Hold Emi Fernandez RN     10/23/2019 0831 sevelamer (RENAGEL) tablet 800 mg 800 mg Oral Given Teresa Lawrence RN     10/22/2019 1716 sevelamer (RENAGEL) tablet 800 mg 800 mg Oral Given Emi Fernandez RN     10/22/2019 1210 sevelamer (RENAGEL) tablet 800 mg 800 mg Oral Given Emi Fernandez RN     10/23/2019 0831 azaTHIOprine (IMURAN) tablet 50 mg 50 mg Oral Given Teresa Lawrence RN     10/22/2019 0948 azaTHIOprine (IMURAN) tablet 50 mg 0 mg Oral Hold Samreen Cline, GERMAN     10/23/2019 0520 heparin (porcine) subcutaneous injection 5,000 Units 5,000 Units Subcutaneous Given Jose Lord, RN     10/22/2019 2141 heparin (porcine) subcutaneous injection 5,000 Units 5,000 Units Subcutaneous Given Darshana Dean RN     10/22/2019 1335 heparin (porcine) subcutaneous injection 5,000 Units 5,000 Units Subcutaneous Given Samreen Cline, RN     10/23/2019 0831 tacrolimus (PROGRAF) capsule 3 mg 3 mg Oral Given Deepika Domínguez RN     10/22/2019 1210 tacrolimus (PROGRAF) capsule 3 mg 3 mg Oral Given Samreen Cline, RN     10/22/2019 2142 tacrolimus (PROGRAF) capsule 2 mg 2 mg Oral Given Darshana Dean RN     10/23/2019 0831 metoprolol succinate (TOPROL-XL) 24 hr tablet 50 mg 50 mg Oral Given Teresa Lawrence, RN     10/22/2019 0949 metoprolol succinate (TOPROL-XL) 24 hr tablet 50 mg 0 mg Oral Hold Samreen Cline, RN     10/23/2019 0120 metroNIDAZOLE (FLAGYL) IVPB (premix) 500 mg 500 mg Intravenous New [address] Jose Lord, GERMAN     10/22/2019 1715 metroNIDAZOLE (FLAGYL) IVPB (premix) 500 mg 500 mg Intravenous [address] [facility]     10/22/2019 1212 metroNIDAZOLE (FLAGYL) IVPB (premix) 500 mg 500 mg Intravenous 600 [facility] Bandera, RN     10/22/2019 2142 senna (SENOKOT) tablet 8 6 mg 8 6 mg Oral Not Given Darshana Dean RN     10/23/2019 0830 docusate sodium (COLACE) capsule 100 mg 100 mg Oral Given Deepika Domínguez, RN     10/22/2019 0949 docusate sodium (COLACE) capsule 100 mg 0 mg Oral Hold Samreen Cline RN     10/23/2019 0039 cefepime (MAXIPIME) 1,000 mg in dextrose 5 % 50 mL IVPB 1,000 mg Intravenous TEPALDA Bishop RN     10/22/2019 1213 cefepime (MAXIPIME) 1,000 mg in dextrose 5 % 50 mL IVPB 1,000 mg Intravenous 600 [facility] Rianna Ni RN     10/22/2019 1100 vancomycin (VANCOCIN) IVPB (premix) 750 mg 750 mg Intravenous New Mitch Gutierrez, RN     10/23/2019 6836 Labetalol HCl (NORMODYNE) injection 10 mg 10 mg Intravenous Given Vadim Hyde RN     10/22/2019 1929 Labetalol HCl (NORMODYNE) injection 10 mg 10 mg Intravenous Given Steven Freedman RN     10/22/2019 1338 Labetalol HCl (NORMODYNE) injection 10 mg 10 mg Intravenous Given Vivi Bass RN     10/23/2019 0519 insulin detemir (LEVEMIR) subcutaneous injection 7 Units 7 Units Subcutaneous Given Vadim Hyde RN     10/22/2019 2142 insulin lispro (HumaLOG) 100 units/mL subcutaneous injection 1-5 Units 2 Units Subcutaneous Given Steven Freedman RN     10/23/2019 0835 insulin lispro (HumaLOG) 100 units/mL subcutaneous injection 3 Units 3 Units Subcutaneous Given Zackary Corona RN     10/22/2019 1715 insulin lispro (HumaLOG) 100 units/mL subcutaneous injection 3 Units 3 Units Subcutaneous Given Vivi Bass RN     10/22/2019 1211 insulin lispro (HumaLOG) 100 units/mL subcutaneous injection 3 Units 3 Units Subcutaneous Given Vivi Bass RN     10/23/2019 7365 insulin lispro (HumaLOG) 100 units/mL subcutaneous injection 1-5 Units 2 Units Subcutaneous Given Vadim Hyde RN     10/22/2019 1716 insulin lispro (HumaLOG) 100 units/mL subcutaneous injection 1-5 Units 1 Units Subcutaneous Not Given Vivi Bass RN     10/22/2019 1211 insulin lispro (HumaLOG) 100 units/mL subcutaneous injection 1-5 Units 1 Units Subcutaneous Given Vivi Bass RN     10/23/2019 5226 NIFEdipine (PROCARDIA XL) 24 hr tablet 90 mg 90 mg Oral Given Zackary Corona RN     10/22/2019 0949 NIFEdipine (PROCARDIA XL) 24 hr tablet 90 mg 0 mg Oral Hold Vivi Bass RN     10/23/2019 0040 bumetanide (BUMEX) 12 5 mg infusion 50 mL 0 mg/hr Intravenous Stopped Vadim Hyde RN     10/23/2019 0519 hydrALAZINE (APRESOLINE) tablet 50 mg 50 mg Oral Given Vadim Hyde RN     10/22/2019 2141 hydrALAZINE (APRESOLINE) tablet 50 mg 50 mg Oral Given Steven Freedman RN     10/22/2019 1334 hydrALAZINE (APRESOLINE) tablet 50 mg 50 mg Oral Given Bryan Belt, RN     10/23/2019 0039 cloNIDine (CATAPRES) tablet 0 1 mg 0 1 mg Oral Given Tollhouse Blew, RN     10/23/2019 0309 cloNIDine (CATAPRES) tablet 0 1 mg 0 1 mg Oral Given Flynn Blew, RN     10/23/2019 0519 amLODIPine (NORVASC) tablet 10 mg 10 mg Oral Given Flynn Blew, RN     10/23/2019 8122 Labetalol HCl (NORMODYNE) injection 10 mg 10 mg Intravenous Given Flynn Blew, RN          PLEASE NOTE:  This encounter was completed utilizing the [employer] Direct Speech Voice Recognition Software  Grammatical errors, random word insertions, pronoun errors and incomplete sentences are occasional consequences of the system due to software limitations, ambient noise and hardware issues  These may be missed by proof reading prior to affixing electronic signature  Any questions or concerns about the content, text or information contained within the body of this dictation should be directly addressed to the physician for clarification  Please do not hesitate to call me directly if you have any any questions or concerns        DO Mohini Ellis 73 Internal Medicine PGY-1

## 2019-10-23 NOTE — HEMODIALYSIS
Pt stable on 3 hr HD tx  Pre tx wt- 49 1 kg, post wt- 49 2 kg  Ran even  Hypotensive throughout tx  Total UF removal- 446  No other issues noted

## 2019-10-23 NOTE — ASSESSMENT & PLAN NOTE
· In setting of hypertensive emergency  · Off of bumex drip  · Started on hemodialysis  · Echo on 10/11 showed EF of 52% with hypokinesis, moderate to severe pulmonary hypertension and possible small VSD  · Cardiology on board  Appreciate input  · I&O and daily weight    · O2 supplementation as needed to maintain O2 sat >89%

## 2019-10-23 NOTE — ASSESSMENT & PLAN NOTE
· Status post renal transplant 2001  · Baseline creatinine 4-5  · Creatinine 4 5 today  · Patient was started on hemodialysis by Nephrology  Had another session today    · Off of the bumex dripalso nitroglycerin drip

## 2019-10-23 NOTE — PLAN OF CARE
Goal-2 L as tolerated         Problem: METABOLIC, FLUID AND ELECTROLYTES - ADULT  Goal: Electrolytes maintained within normal limits  Description  INTERVENTIONS:  - Monitor labs and assess patient for signs and symptoms of electrolyte imbalances  - Administer electrolyte replacement as ordered  - Monitor response to electrolyte replacements, including repeat lab results as appropriate  - Instruct patient on fluid and nutrition as appropriate  Outcome: Progressing

## 2019-10-23 NOTE — PROGRESS NOTES
Progress Note - Nephrology   Yefri Kaiser 51 y o  male MRN: [id number]  Unit/Bed#: [facility] 529-01 Encounter: [id number]      Assessment:  65-year-old male with past medical history significant for CKD stage 5 status post living donor renal transplant in 2001, hypertension, diabetes type 1, CAD post stents, R AKA,  who presented to the hospital on 10/20/19 with hypertensive emergency and acute congestive heart failure  Unfortunately he has had multiple recent admissions related to volume overload and MIKE  A RIJ 14 F tunneled dialysis catheter was placed in IR on 10/21/2019 and the patient received his first hemodialysis treatment on 10/22/19 without difficulty and a total of 1300 mL UF was removed  Today patient states that he is feeling well and is ready to go home  His ROS are negative and states he is making a lot of urine  All of his questions were answered  He is scheduled for second hemodialysis treatment today  Plan:  MIKE POA with history of live donor renal transplant 2001 on CKD 5  · Follows with [facility] for transplant, continue imuran, tacrolimus, and prednisone  · Tacrolimus level 4 5 on 10/21/19  · Creatinine 5 99 upon admission, 4 5 today  eGFR 14 today, likely overestimated as pt with R AKA  · 3 hospital admissions since 9/24/19 with creatinine ranging between 5-6  · Received first hemodialysis 10/22/19 with removal of 1300 mL UF via R tunneled HD cath  · Patient hopes for future home hemodialysis but will likely be starting at   · Plan for 3 hour HD treatment today    Access  · RIJ Tunneled double lumen dialysis catheter inserted 10/21/2019 in IR  · No access issues  Plan to save left arm      Acute Congestive Heart Failure   · Cardiology following  · Improving s/p volume removal, net negative 4 7 L  · Bumex infusion discontinued, on HD  · Continues on 3LNC for now, wean as tolerated    Hypertensive Crisis and Flash Pulmonary Edema  · BP elevated with SBP >190 overnight requiring 2 PRN doses of  1 mg Clonidine  · Nitroglycerin infusion discontinued  · Re-evaluation blood pressure after more volume removal today  · 3LNC, wean as tolerated  · Continue amlodipine, carvedilol, hydralazine and PRN labetalol       Anemia   · Fecal occult studies positive in 2017 and 2019, no recent studies  · History of GI bleed  · Last iron studies 10/13/19 and acceptable  · Continue PPI, monitor s/s anemia  · Transfuse hemoglobin <7      Diabetes Mellitus type 1  · Continue short- and long-acting insulin, up titrate as necessary   · Last A1C 4 8 on 10/11/2019        Subjective:   Pt reports feeling good today  Denied headache, dizziness, chest pain, shortness of breath, abdominal pain, diarrhea, nausea, numbness, cramping  Objective:     Vitals: Blood pressure 164/89, pulse 79, temperature 98 °F (36 7 °C), temperature source Oral, resp  rate 18, height 5' 4" (1 626 m), weight 52 1 kg (114 lb 14 4 oz), SpO2 100 %  ,Body mass index is 19 72 kg/m²  Weight (last 2 days)     Date/Time   Weight    10/22/19 0301   52 1 (114 9)                Intake/Output Summary (Last 24 hours) at 10/23/2019 1234  Last data filed at 10/23/2019 1149  Gross per 24 hour   Intake 538 ml   Output 2625 ml   Net -2087 ml       Permanent HD Catheter  (Active)   Line Necessity Reviewed Yes, reviewed with provider 10/23/2019  7:30 AM   Site Assessment Clean;Dry; Intact 10/23/2019  7:30 AM   Proximal Lumen ([place] Port-PICC only) Status Capped 10/23/2019  7:30 AM   Medial Lumen (Purple/[place] Port-PICC only) Status Capped 10/23/2019  7:30 AM   Distal Lumen ([place] Port-PICC only) Status Capped 10/23/2019  7:30 AM   Catheter Out on Skin (cm) 0 cm 10/21/2019  2:34 PM   Dressing Type Chlorhexidine dressing 10/23/2019  7:30 AM   Dressing Status Clean;Dry; Intact 10/23/2019  7:30 AM   Dressing Change Due 10/28/19 10/22/2019 11:15 AM       Physical Exam: General appearance: alert and oriented, in no acute distress  Head: Normocephalic, without obvious abnormality, atraumatic  Eyes: Anicteric  Throat: lips, mucosa, and tongue normal; teeth and gums normal  Neck: supple, midline  Lungs: clear to auscultation bilaterally  Heart: regular rate and rhythm, S1, S2 normal, no murmur, click, rub or gallop  Abdomen: soft, non-tender; bowel sounds normal; no masses,  no organomegaly  Extremities: R AKA, left lower extremity without edema  Pulses: left pedal pulse +2, right AKA  Skin: Skin color, texture, turgor normal  No rashes or lesions   Pale  Neurologic: Grossly normal     Lab, Imaging and other studies:   CBC:   Lab Results   Component Value Date    WBC 9 41 10/23/2019    HGB 7 9 (L) 10/23/2019    HCT 25 0 (L) 10/23/2019    MCV 95 10/23/2019     10/23/2019    MCH 29 9 10/23/2019    MCHC 31 6 10/23/2019    RDW 15 5 (H) 10/23/2019    MPV 8 9 10/23/2019     CMP:   Lab Results   Component Value Date    SODIUM 135 (L) 10/23/2019    K 4 2 10/23/2019     10/23/2019    CO2 25 10/23/2019    BUN 65 (H) 10/23/2019    CREATININE 4 50 (H) 10/23/2019    CALCIUM 8 5 10/23/2019    EGFR 14 10/23/2019

## 2019-10-23 NOTE — ASSESSMENT & PLAN NOTE
Lab Results   Component Value Date    HGBA1C 4 8 10/11/2019       Recent Labs     10/23/19  0641 10/23/19  1110 10/23/19  1251 10/23/19  1654   POCGLU 244* 236* 280* 124       Blood Sugar Average: Last 72 hrs:  (P) 799 [id number]     Endocrinology on board  monitor blood sugar  Continue with basal bolus regimen  Seven units Lantus at bedtime with 3 units t i d  Lispro for mealtime coverage  Appreciate Endocrinology input  Insulin sliding scale and Accu-Cheks

## 2019-10-23 NOTE — PROGRESS NOTES
NEPHROLOGY PROGRESS NOTE   Hemodialysis procedure note:  Nilo Harden 51 y o  male MRN: [id number]  Unit/Bed#: [facility] 529-01 Encounter: [id number]      ASSESSMENT    Pleasant 62-year-old male transferred from [facility] for respiratory distress has a history of living related donor transplant in 2001 has a previous transplant as well, type 1 diabetes mellitus and stage 5 CKD with a baseline creatinine of 4 5 who presented with hypertensive urgency and flash pulmonary edema     1  Acute kidney injury with a history of kidney transplant on  -1 year ago his creatinine was around 2 5-3, over the last 2 months he has had creatinine in the 5-6 range with over 3 hospital admission  -is interested in home modality in the long-term  -is a history of a living donor transplant from 2001  -follows with [facility] for transplant and Nephrology  2  Stage 5 chronic kidney disease not yet on dialysis  -his baseline creatinine is around 4 5 but likely now with multiple acute kidney injury he has progression and his GFR is 10 mL per minute, given his 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 his GFR is overestimated  3  Pulmonary edema  -flash pulmonary edema in the setting of persistent hypertension  -improved respiratory status  4  Type 1 diabetes mellitus  -currently on insulin  5  History of GI bleed  -on PPI  6  Immunosuppressed  -currently on azathioprine 50 mg daily  -tacrolimus 3 mg a m  And 2 mg p m  7  Hypertension  -metoprolol XL 50 mg daily  -nitroglycerin drip  -diuresis    IMPRESSION & PLAN    Hemodialysis procedure note:  Has a tunneled dialysis catheter and this is his 2nd treatment he was seen on dialysis at approximately 2:08 p m   His blood pressures are now in the 80 systolic he was hypertensive overnight and his blood pressure medications were up titrated in his given labetalol plan now blood pressures are low back off on ultrafiltration he is still is on 3 L of normal saline  -we are using a 136 sodium, 2 potassium, 30 bicarb bath and an F 160 dialyzer assess for 3rd treatment tomorrow for 3-1/2 hours  Will need outpatient placement  Once more euvolemic his blood pressure likely decreased and will likely need to deescalate blood pressure medication for now will increase hold parameters to a systolic of 074 so that adequate ultrafiltration can be done        SUBJECTIVE:    Patient was seen today on dialysis tolerating treatment without any difficulty    OBJECTIVE:  Current Weight: Weight - Scale: 52 1 kg (114 lb 14 4 oz)  Vitals:    10/23/19 1330   BP: (!) 91/46   Pulse: 68   Resp: 18   Temp:    SpO2:        Intake/Output Summary (Last 24 hours) at 10/23/2019 1408  Last data filed at 10/23/2019 1315  Gross per 24 hour   Intake 620 ml   Output 2625 ml   Net -2005 ml       General: conscious, cooperative, in no acute distress  Eyes: conjunctivae pink, anicteric sclerae  ENT: lips and mucous membranes moist  Neck: supple, no JVD  Chest: clear breath sounds bilateral, no crackles, ronchus or wheezings, tunneled dialysis catheter with some dry blood  CVS: normal rate, regular rhythm  Abdomen: soft, non-tender, non-distended, normoactive bowel sounds  Extremities:  AKA on the right  Skin: no rash  Neuro: awake, alert, oriented  Psych:  Pleasant affect      Medications:    Current Facility-Administered Medications:     acetaminophen (TYLENOL) tablet 650 mg, 650 mg, Oral, Q6H PRN, Mary Nino MD, 650 mg at 10/22/19 0538    aspirin chewable tablet 81 mg, 81 mg, Oral, Daily, Carlos Taylor PA-C, 81 mg at 10/23/19 0830    atorvastatin (LIPITOR) tablet 80 mg, 80 mg, Oral, Daily With Dinner, Mena Hercules PA-C, 80 mg at 10/22/19 1715    azaTHIOprine (IMURAN) tablet 50 mg, 50 mg, Oral, Daily, Rosalva Taylor PA-C, 50 mg at 10/23/19 0831    carvedilol (COREG) tablet 6 25 mg, 6 25 mg, Oral, BID With Meals, Clemencia Garrett,     cefepime (MAXIPIME) 1,000 mg in dextrose 5 % 50 mL IVPB, 1,000 mg, Intravenous, Q12H, Alisha Victor JOEL Taylor PA-C, Last Rate: 100 mL/hr at 10/23/19 0039, 1,000 mg at 10/23/19 0039    docusate sodium (COLACE) capsule 100 mg, 100 mg, Oral, Daily, Rosalva Taylor PA-C, 100 mg at 10/23/19 0830    heparin (porcine) subcutaneous injection 5,000 Units, 5,000 Units, Subcutaneous, Q8H [address], 5,000 Units at 10/23/19 0520 **AND** [CANCELED] Platelet count, , , Once, Taylor Guillermo MD    hydrALAZINE (APRESOLINE) tablet 50 mg, 50 mg, Oral, Q8H [address], Brien Cali, DO, 50 mg at 10/23/19 0519    insulin detemir (LEVEMIR) subcutaneous injection 7 Units, 7 Units, Subcutaneous, Early Morning, Ez Carrillo MD, 7 Units at 10/23/19 0519    insulin lispro (HumaLOG) 100 units/mL subcutaneous injection 1-5 Units, 1-5 Units, Subcutaneous, HS, Ez Carrillo MD, 2 Units at 10/22/19 2142    insulin lispro (HumaLOG) 100 units/mL subcutaneous injection 1-5 Units, 1-5 Units, Subcutaneous, TID AC, 2 Units at 10/23/19 1208 **AND** Fingerstick Glucose (POCT), , , TID AC, Mendel Ferris, MD    insulin lispro (HumaLOG) 100 units/mL subcutaneous injection 3 Units, 3 Units, Subcutaneous, TID With Meals, Mendel Ferris, MD, 3 Units at 10/23/19 1208    Labetalol HCl (NORMODYNE) injection 10 mg, 10 mg, Intravenous, Q6H PRN, Maribel Johnston PA-C, 10 mg at 10/23/19 0208    metroNIDAZOLE (FLAGYL) IVPB (premix) 500 mg, 500 mg, Intravenous, Q8H, Rosalva Taylor PA-C, Last Rate: 200 mL/hr at 10/23/19 1100, 500 mg at 10/23/19 1100    NIFEdipine (PROCARDIA XL) 24 hr tablet 90 mg, 90 mg, Oral, Daily, Kash Downs MD, 90 mg at 10/23/19 0832    nitroGLYcerin (TRIDIL) 50 mg in 250 mL infusion (premix), 5-200 mcg/min, Intravenous, Titrated, Maribel Johnston PA-C, Stopped at 10/20/19 1330    pantoprazole (PROTONIX) EC tablet 40 mg, 40 mg, Oral, Early Morning, Rosalva Taylor PA-C, 40 mg at 10/23/19 0519    predniSONE tablet 5 mg, 5 mg, Oral, Daily, Alex Taylor PA-C, 5 mg at 10/23/19 0830    senna (SENOKOT) tablet 8 6 mg, 1 tablet, Oral, HS, Jayne Taylor PA-C    sevelamer (RENAGEL) tablet 800 mg, 800 mg, Oral, TID With Meals, Jayne Taylor PA-C, 800 mg at 10/23/19 1103    tacrolimus (PROGRAF) capsule 2 mg, 2 mg, Oral, HS, Rosalva Taylor PA-C, 2 mg at 10/22/19 2142    tacrolimus (PROGRAF) capsule 3 mg, 3 mg, Oral, QAM, Rosalva Taylor PA-C, 3 mg at 10/23/19 0831    vancomycin (VANCOCIN) IVPB (premix) 750 mg, 15 mg/kg, Intravenous, Daily PRN, Lucrecia Ruiz PA-C, Last Rate: 150 mL/hr at 10/22/19 1100, 750 mg at 10/22/19 1100    Invasive Devices:      Lab Results:   Results from last 7 days   Lab Units 10/23/19  0515 10/22/19  0859 10/21/19  0511 10/20/19  1628 10/20/19  0419 10/20/19  0409 10/20/19  0029 10/20/19  0002 10/19/19  2300   WBC Thousand/uL 9 41 10 97* 10 00  --  10 90*  --   --   --  15 39*   HEMOGLOBIN g/dL 7 9* 8 0* 7 6* 7 8* 7 3*  --   --   --  9 6*   HEMATOCRIT % 25 0* 25 2* 23 9* 24 0* 22 8*  --   --   --  29 9*   PLATELETS Thousands/uL 262 281 281  --  299  --   --   --  472*   POTASSIUM mmol/L 4 2 4 0 4 2  --   --  4 3  --   --  3 4*   CHLORIDE mmol/L 100 97* 99*  --   --  99*  --   --  111*   CO2 mmol/L 25 25 28  --   --  31  --   --  18*   BUN mg/dL 65* 123* 108*  --   --  123*  --   --  78*   CREATININE mg/dL 4 50* 6 52* 6 01*  --   --  5 99*  --   --  3 44*   CALCIUM mg/dL 8 5 8 7 9 1  --   --  10 1  --   --  5 4*   MAGNESIUM mg/dL  --   --   --   --   --  2 3  --   --  2 3   PHOSPHORUS mg/dL  --   --   --   --   --  7 1*  --   --   --    ALK PHOS U/L  --   --   --   --   --  157*  --   --  131*   ALT U/L  --   --   --   --   --  42  --   --  26   AST U/L  --   --   --   --   --  38  --   --  55*   BLOOD CULTURE   --   --   --   --   --   --  No Growth at 48 hrs  No Growth at 48 hrs    --        Previous work up:  Please see previous note

## 2019-10-23 NOTE — ASSESSMENT & PLAN NOTE
· Likely secondary to acute pulmonary edema in setting of hypertensive emergency and Jamshid on CKD  · Initially required BiPAP and ICU admission  · Currently on nasal cannula  · Was also started on broad-spectrum antibiotics given high procalcitonin which can be falls in setting of acute kidney injury on CKD  · Patient was started on dialysis  On Monday   · O2 supplementation as needed to maintain O2 sat above 89%    · Infectious workup so far is negative, will DC antibiotics   · Trend procalcitonin in a m -procalcitonin elevation could be secondary to chronic kidney disease

## 2019-10-23 NOTE — PROGRESS NOTES
Progress Note - Alessandro Simon 1970, 51 y o  male MRN: [id number]    Unit/Bed#: [facility] 529-01 Encounter: [id number]    Primary Care Provider: Nayeli Ferguson DO   Date and time admitted to hospital: 10/20/2019  2:21 AM        * Acute respiratory failure with hypoxia [facility])  Assessment & Plan  · Likely secondary to acute pulmonary edema in setting of hypertensive emergency and Jamshid on CKD  · Initially required BiPAP and ICU admission  · Currently on nasal cannula  · Was also started on broad-spectrum antibiotics given high procalcitonin which can be falls in setting of acute kidney injury on CKD  · Patient was started on dialysis  On Monday   · O2 supplementation as needed to maintain O2 sat above 89%  · Infectious workup so far is negative, will DC antibiotics   · Trend procalcitonin in a m -procalcitonin elevation could be secondary to chronic kidney disease    Hx of right BKA ([facility])  Assessment & Plan  · Known history of  right BKA, patient with  prosthesis  · Supportive care    Acute pulmonary edema ([address] )  Assessment & Plan  · In setting of hypertensive emergency  · Off of bumex drip  · Started on hemodialysis  · Echo on 10/11 showed EF of 52% with hypokinesis, moderate to severe pulmonary hypertension and possible small VSD  · Cardiology on board  Appreciate input  · I&O and daily weight  · O2 supplementation as needed to maintain O2 sat >89%    Chronic anemia  Assessment & Plan  · Baseline hemoglobin between 7-8  · Currently at baseline  · Trend CBC  · Has history of GI bleed  Transfuse p r n  Acute kidney injury superimposed on CKD [facility])  Assessment & Plan  · Status post renal transplant 2001  · Baseline creatinine 4-5  · Creatinine 4 5 today  · Patient was started on hemodialysis by Nephrology  Had another session today    · Off of the bumex dripalso nitroglycerin drip    Elevated troponin  Assessment & Plan  Likely non MI elevation in setting of acute pulmonary edema, uncontrolled hypertension and MIKE  Cardiology on board , appreciate input  Hypertension  Assessment & Plan  · Presented with hypertensive  emergency, now better controlled  · Continue with Bumex infusion  · Continue with Toprol-XL 50 mg daily, Procardia XL 90 mg daily, hydralazine 50 mg q 8 hours  · P r n  Labetalol ordered  · Off nitroglycerin drip  · Patient appears to be hypotensive with dialysis    Type 1 diabetes mellitus [facility])  Assessment & Plan  Lab Results   Component Value Date    HGBA1C 4 8 10/11/2019       Recent Labs     10/23/19  0641 10/23/19  1110 10/23/19  1251 10/23/19  1654   POCGLU 244* 236* 280* 124       Blood Sugar Average: Last 72 hrs:  (P) 275 [id number]     Endocrinology on board  monitor blood sugar  Continue with basal bolus regimen  Seven units Lantus at bedtime with 3 units t i d  Lispro for mealtime coverage  Appreciate Endocrinology input  Insulin sliding scale and Accu-Cheks  VTE Pharmacologic Prophylaxis:   Pharmacologic: Heparin  Mechanical VTE Prophylaxis in Place: Yes    Patient Centered Rounds: I have performed bedside rounds with nursing staff today  Discussions with Specialists or Other Care Team Provider: nephrology    Education and Discussions with Family / Patient: offered to call, tomorrow    Time Spent for Care: 30 minutes  More than 50% of total time spent on counseling and coordination of care as described above  Current Length of Stay: 3 day(s)    Current Patient Status: Inpatient   Certification Statement: The patient will continue to require additional inpatient hospital stay due to dialysis placement    Discharge Plan:     Code Status: Level 1 - Full Code      Subjective:   Patient seen and examined  Reported that tremor is improving  No specific complaints offered  Discussed with the patient about discontinuing the antibiotics       Objective:     Vitals:   Temp (24hrs), Av 3 °F (36 8 °C), Min:98 °F (36 7 °C), Max:98 9 °F (37 2 °C)    Temp: [98 °F (36 7 °C)-98 9 °F (37 2 °C)] 98 1 °F (36 7 °C)  HR:  [61-79] 61  Resp:  [16-20] 16  BP: ()/(38-95) 92/60  SpO2:  [96 %-100 %] 100 %  Body mass index is 19 72 kg/m²  Input and Output Summary (last 24 hours): Intake/Output Summary (Last 24 hours) at 10/23/2019 1923  Last data filed at 10/23/2019 1615  Gross per 24 hour   Intake 800 ml   Output 2321 ml   Net -1521 ml       Physical Exam:     Physical Exam   Constitutional: No distress  HENT:   Head: Normocephalic and atraumatic  Neck: No JVD present  Cardiovascular: Normal rate  Pulmonary/Chest: Effort normal and breath sounds normal    Abdominal: Soft  Musculoskeletal: Normal range of motion  He exhibits no edema  Right bka   Neurological: He is alert  Additional Data:     Labs:    Results from last 7 days   Lab Units 10/23/19  0515 10/22/19  0859   WBC Thousand/uL 9 41 10 97*   HEMOGLOBIN g/dL 7 9* 8 0*   HEMATOCRIT % 25 0* 25 2*   PLATELETS Thousands/uL 262 281   NEUTROS PCT %  --  69   LYMPHS PCT %  --  17   MONOS PCT %  --  7   EOS PCT %  --  5     Results from last 7 days   Lab Units 10/23/19  0515  10/20/19  0409   POTASSIUM mmol/L 4 2   < > 4 3   CHLORIDE mmol/L 100   < > 99*   CO2 mmol/L 25   < > 31   BUN mg/dL 65*   < > 123*   CREATININE mg/dL 4 50*   < > 5 99*   CALCIUM mg/dL 8 5   < > 10 1   ALK PHOS U/L  --   --  157*   ALT U/L  --   --  42   AST U/L  --   --  38    < > = values in this interval not displayed  Results from last 7 days   Lab Units 10/20/19  0419   INR  1 09       * I Have Reviewed All Lab Data Listed Above  * Additional Pertinent Lab Tests Reviewed: [address] Admission Reviewed    Imaging:    Imaging Reports Reviewed Today Include:   Imaging Personally Reviewed by Myself Includes:      Recent Cultures (last 7 days):     Results from last 7 days   Lab Units 10/20/19  0029 10/20/19  0002   BLOOD CULTURE  No Growth at 72 hrs  No Growth at 72 hrs         Last 24 Hours Medication List:     Current Facility-Administered Medications:  acetaminophen 650 mg Oral Q6H PRN Benita Maria MD   aspirin 81 mg Oral Daily Jessica Lee PA-C   atorvastatin 80 mg Oral Daily With [facility] EMANUEL Mayfield   azaTHIOprine 50 mg Oral Daily Jessica Lee PA-C   carvedilol 6 25 mg Oral BID With Meals Cristiane Smith DO   docusate sodium 100 mg Oral Daily Rosalva Taylor PA-C   heparin (porcine) 5,000 Units Subcutaneous Q8H [address] Rosalva Taylor PA-C   hydrALAZINE 50 mg Oral Q8H [address] Brien Cali, DO   insulin detemir 7 Units Subcutaneous Early Morning Aubree Lynch MD   insulin lispro 1-5 Units Subcutaneous HS Aubree Lynch MD   insulin lispro 1-5 Units Subcutaneous TID AC Travis Xiong MD   insulin lispro 3 Units Subcutaneous TID With Meals Dmitri Rendon MD   Labetalol HCl 10 mg Intravenous Q6H PRN Jessica Lee PA-C   [START ON 10/24/2019] NIFEdipine ER 90 mg Oral Daily Brien Cali,    pantoprazole 40 mg Oral Early Morning Rosalva Taylor PA-C   predniSONE 5 mg Oral Daily Jessica Lee PA-C   senna 1 tablet Oral HS Jessica Lee PA-C   sevelamer 800 mg Oral TID With Meals Warren Taylor PA-C   tacrolimus 2 mg Oral HS Rosalva Taylor PA-C   tacrolimus 3 mg Oral QAM Jessica Lee PA-C        Today, Patient Was Seen By: Gina Bingham MD    ** Please Note: Dictation voice to text software may have been used in the creation of this document   **

## 2019-10-23 NOTE — PROGRESS NOTES
Was told in change of shift report at 2300 that patient's BP have not been controlled all day  The nurse prior to me gave a PRN labetalol at 1930 and his scheduled hydralazine at around 2200  The 2300 vitals showed a BP of 174/94  MELISSA was made aware and ordered CATAPRES  That was given at [facility]  BP was rechecked at 0200 and was 195/92 automatically and checked manually as well and read almost the same  I gave PRN labetalol and notified BRYCE again  Pt is now resting comfortably with no complaints of pain or discomfort  Will cont to monitor and wait for any orders

## 2019-10-23 NOTE — ASSESSMENT & PLAN NOTE
· Presented with hypertensive  emergency, now better controlled  · Continue with Bumex infusion  · Continue with Toprol-XL 50 mg daily, Procardia XL 90 mg daily, hydralazine 50 mg q 8 hours  · P r n  Labetalol ordered  · Off nitroglycerin drip    · Patient appears to be hypotensive with dialysis

## 2019-10-23 NOTE — ASSESSMENT & PLAN NOTE
· Baseline hemoglobin between 7-8  · Currently at baseline  · Trend CBC  · Has history of GI bleed  Transfuse p r n

## 2019-10-23 NOTE — SOCIAL WORK
Spoke to [place] VERNON, [phone], ext [phone]  They received all records and will do insurance check and send records to [facility]

## 2019-10-24 ENCOUNTER — APPOINTMENT (INPATIENT)
Dept: DIALYSIS | Facility: HOSPITAL | Age: 49
DRG: 291 | End: 2019-10-24
Payer: COMMERCIAL

## 2019-10-24 LAB
ANION GAP SERPL CALCULATED.3IONS-SCNC: 10 MMOL/L (ref 4–13)
BUN SERPL-MCNC: 30 MG/DL (ref 5–25)
CALCIUM SERPL-MCNC: 8.6 MG/DL (ref 8.3–10.1)
CHLORIDE SERPL-SCNC: 99 MMOL/L (ref 100–108)
CO2 SERPL-SCNC: 22 MMOL/L (ref 21–32)
CREAT SERPL-MCNC: 3.07 MG/DL (ref 0.6–1.3)
ERYTHROCYTE [DISTWIDTH] IN BLOOD BY AUTOMATED COUNT: 15 % (ref 11.6–15.1)
GFR SERPL CREATININE-BSD FRML MDRD: 23 ML/MIN/1.73SQ M
GLUCOSE SERPL-MCNC: 164 MG/DL (ref 65–140)
GLUCOSE SERPL-MCNC: 218 MG/DL (ref 65–140)
GLUCOSE SERPL-MCNC: 285 MG/DL (ref 65–140)
GLUCOSE SERPL-MCNC: 401 MG/DL (ref 65–140)
GLUCOSE SERPL-MCNC: 431 MG/DL (ref 65–140)
GLUCOSE SERPL-MCNC: 493 MG/DL (ref 65–140)
HCT VFR BLD AUTO: 24 % (ref 36.5–49.3)
HGB BLD-MCNC: 7.5 G/DL (ref 12–17)
MCH RBC QN AUTO: 29.8 PG (ref 26.8–34.3)
MCHC RBC AUTO-ENTMCNC: 31.3 G/DL (ref 31.4–37.4)
MCV RBC AUTO: 95 FL (ref 82–98)
PLATELET # BLD AUTO: 207 THOUSANDS/UL (ref 149–390)
PMV BLD AUTO: 9.3 FL (ref 8.9–12.7)
POTASSIUM SERPL-SCNC: 3.9 MMOL/L (ref 3.5–5.3)
PROCALCITONIN SERPL-MCNC: 0.58 NG/ML
RBC # BLD AUTO: 2.52 MILLION/UL (ref 3.88–5.62)
SODIUM SERPL-SCNC: 131 MMOL/L (ref 136–145)
WBC # BLD AUTO: 9.29 THOUSAND/UL (ref 4.31–10.16)

## 2019-10-24 PROCEDURE — G8978 MOBILITY CURRENT STATUS: HCPCS

## 2019-10-24 PROCEDURE — 80048 BASIC METABOLIC PNL TOTAL CA: CPT | Performed by: FAMILY MEDICINE

## 2019-10-24 PROCEDURE — 99232 SBSQ HOSP IP/OBS MODERATE 35: CPT | Performed by: FAMILY MEDICINE

## 2019-10-24 PROCEDURE — G8979 MOBILITY GOAL STATUS: HCPCS

## 2019-10-24 PROCEDURE — 99232 SBSQ HOSP IP/OBS MODERATE 35: CPT | Performed by: INTERNAL MEDICINE

## 2019-10-24 PROCEDURE — 84145 PROCALCITONIN (PCT): CPT | Performed by: FAMILY MEDICINE

## 2019-10-24 PROCEDURE — 97162 PT EVAL MOD COMPLEX 30 MIN: CPT

## 2019-10-24 PROCEDURE — 90935 HEMODIALYSIS ONE EVALUATION: CPT | Performed by: INTERNAL MEDICINE

## 2019-10-24 PROCEDURE — G8980 MOBILITY D/C STATUS: HCPCS

## 2019-10-24 PROCEDURE — 85027 COMPLETE CBC AUTOMATED: CPT | Performed by: FAMILY MEDICINE

## 2019-10-24 PROCEDURE — 82948 REAGENT STRIP/BLOOD GLUCOSE: CPT

## 2019-10-24 RX ADMIN — INSULIN LISPRO 4 UNITS: 100 INJECTION, SOLUTION INTRAVENOUS; SUBCUTANEOUS at 06:33

## 2019-10-24 RX ADMIN — AZATHIOPRINE 50 MG: 50 TABLET ORAL at 13:06

## 2019-10-24 RX ADMIN — NIFEDIPINE 90 MG: 60 TABLET, FILM COATED, EXTENDED RELEASE ORAL at 13:06

## 2019-10-24 RX ADMIN — SENNOSIDES 8.6 MG: 8.6 TABLET, FILM COATED ORAL at 22:01

## 2019-10-24 RX ADMIN — INSULIN LISPRO 3 UNITS: 100 INJECTION, SOLUTION INTRAVENOUS; SUBCUTANEOUS at 13:08

## 2019-10-24 RX ADMIN — HEPARIN SODIUM 5000 UNITS: 5000 INJECTION INTRAVENOUS; SUBCUTANEOUS at 13:04

## 2019-10-24 RX ADMIN — TACROLIMUS 3 MG: 1 CAPSULE ORAL at 13:05

## 2019-10-24 RX ADMIN — INSULIN LISPRO 1 UNITS: 100 INJECTION, SOLUTION INTRAVENOUS; SUBCUTANEOUS at 18:24

## 2019-10-24 RX ADMIN — HEPARIN SODIUM 5000 UNITS: 5000 INJECTION INTRAVENOUS; SUBCUTANEOUS at 05:37

## 2019-10-24 RX ADMIN — ASPIRIN 81 MG 81 MG: 81 TABLET ORAL at 13:06

## 2019-10-24 RX ADMIN — SEVELAMER HYDROCHLORIDE 800 MG: 800 TABLET, FILM COATED PARENTERAL at 18:25

## 2019-10-24 RX ADMIN — INSULIN DETEMIR 7 UNITS: 100 INJECTION, SOLUTION SUBCUTANEOUS at 05:37

## 2019-10-24 RX ADMIN — DOCUSATE SODIUM 100 MG: 100 CAPSULE, LIQUID FILLED ORAL at 13:05

## 2019-10-24 RX ADMIN — PREDNISONE 5 MG: 5 TABLET ORAL at 13:05

## 2019-10-24 RX ADMIN — PANTOPRAZOLE SODIUM 40 MG: 40 TABLET, DELAYED RELEASE ORAL at 05:38

## 2019-10-24 RX ADMIN — INSULIN LISPRO 2 UNITS: 100 INJECTION, SOLUTION INTRAVENOUS; SUBCUTANEOUS at 22:02

## 2019-10-24 RX ADMIN — ATORVASTATIN CALCIUM 80 MG: 80 TABLET, FILM COATED ORAL at 18:25

## 2019-10-24 RX ADMIN — HYDRALAZINE HYDROCHLORIDE 50 MG: 50 TABLET ORAL at 05:37

## 2019-10-24 RX ADMIN — HEPARIN SODIUM 5000 UNITS: 5000 INJECTION INTRAVENOUS; SUBCUTANEOUS at 22:01

## 2019-10-24 RX ADMIN — HYDRALAZINE HYDROCHLORIDE 50 MG: 50 TABLET ORAL at 13:06

## 2019-10-24 RX ADMIN — INSULIN LISPRO 3 UNITS: 100 INJECTION, SOLUTION INTRAVENOUS; SUBCUTANEOUS at 07:20

## 2019-10-24 RX ADMIN — TACROLIMUS 2 MG: 1 CAPSULE ORAL at 22:02

## 2019-10-24 RX ADMIN — INSULIN LISPRO 3 UNITS: 100 INJECTION, SOLUTION INTRAVENOUS; SUBCUTANEOUS at 18:24

## 2019-10-24 RX ADMIN — SEVELAMER HYDROCHLORIDE 800 MG: 800 TABLET, FILM COATED PARENTERAL at 13:08

## 2019-10-24 NOTE — PROGRESS NOTES
Progress Note - Alessandro Simon 1970, 51 y o  male MRN: [id number]    Unit/Bed#: [facility] 529-01 Encounter: [id number]    Primary Care Provider: Nayeli Ferguson DO   Date and time admitted to hospital: 10/20/2019  2:21 AM        * Acute respiratory failure with hypoxia [facility])  Assessment & Plan  · Likely secondary to acute pulmonary edema in setting of hypertensive emergency and Jamshid on CKD  · Initially required BiPAP and ICU admission  · Currently on nasal cannula  · Was also started on broad-spectrum antibiotics given high procalcitonin which can be falls in setting of acute kidney injury on CKD  · Patient was started on dialysis  On Monday    · O2 supplementation as needed to maintain O2 sat above 89%  · Infectious workup so far is negative, antibiotics discontinued yesterday  · Procalcitonin remained stable-likely secondary to chronic kidney disease    Hx of right [address]KA ([facility] [address] )  Assessment & Plan  · Known history of  right BKA, patient with  prosthesis  · Supportive care    [facility] ([facility] )  Assessment & Plan  · In setting of hypertensive emergency  · Off of bumex drip  · Started on hemodialysis  · Echo on 10/11 showed EF of 52% with hypokinesis, moderate to severe pulmonary hypertension and possible small VSD  · Cardiology on board  Appreciate input  · I&O and daily weight  · O2 supplementation as needed to maintain O2 sat >89%    Chronic anemia  Assessment & Plan  · Baseline hemoglobin between 7-8  · Currently at baseline  · Trend CBC  · Has history of GI bleed  Transfuse p r n for hemoglobin less than 7    Acute kidney injury superimposed on CKD [facility])  Assessment & Plan  · Status post renal transplant 2001  · Baseline creatinine 4-5  · Creatinine 4 5 today  · Patient was started on hemodialysis by Nephrology  Had another session today        Elevated troponin  Assessment & Plan  Likely non MI elevation in setting of acute pulmonary edema, uncontrolled hypertension and JAMSHID  Cardiology on board , appreciate input  Hypertension  Assessment & Plan  · Presented with hypertensive  emergency, now better controlled  · Continue with Toprol-XL 50 mg daily, Procardia XL 90 mg daily, hydralazine 50 mg q 8 hours  · P r n  Labetalol ordered  · Patient was hypotensive after dialysis yesterday  Monitor blood pressure with dialysis        VTE Pharmacologic Prophylaxis:   Pharmacologic: Heparin  Mechanical VTE Prophylaxis in Place: Yes    Patient Centered Rounds: I have performed bedside rounds with nursing staff today  Discussions with Specialists or Other Care Team Provider:     Education and Discussions with Family / Patient:  Updated patient  Called wife twice today  Unable to leave a voicemail since there is no voicemail set up    Time Spent for Care: 30 minutes  More than 50% of total time spent on counseling and coordination of care as described above  Current Length of Stay: 4 day(s)    Current Patient Status: Inpatient   Certification Statement: The patient will continue to require additional inpatient hospital stay due to Pending dialysis placement    Discharge Plan:     Code Status: Level 1 - Full Code      Subjective:   Patient seen and examined  Feeling better  No specific complaints offered  Had dialysis today  Objective:     Vitals:   Temp (24hrs), Av 1 °F (36 7 °C), Min:97 6 °F (36 4 °C), Max:98 6 °F (37 °C)    Temp:  [97 6 °F (36 4 °C)-98 6 °F (37 °C)] 97 6 °F (36 4 °C)  HR:  [61-90] 78  Resp:  [16-20] 18  BP: ()/(42-96) 145/75  SpO2:  [98 %-99 %] 98 %  Body mass index is 19 72 kg/m²  Input and Output Summary (last 24 hours): Intake/Output Summary (Last 24 hours) at 10/24/2019 1521  Last data filed at 10/24/2019 1303  Gross per 24 hour   Intake 1868 ml   Output 2196 ml   Net -328 ml       Physical Exam:     Physical Exam   Constitutional: No distress  HENT:   Head: Normocephalic  Mouth/Throat: No oropharyngeal exudate  Neck: No JVD present  Cardiovascular: Normal rate  No murmur heard  Pulmonary/Chest:   Decreased breath sounds bilateral   Abdominal: Soft  He exhibits no distension  There is no tenderness  Musculoskeletal: Normal range of motion  He exhibits no edema  Right bka     Neurological: He is alert  No cranial nerve deficit  Coordination normal    Skin: He is not diaphoretic  Additional Data:     Labs:    Results from last 7 days   Lab Units 10/24/19  0808  10/22/19  0859   WBC Thousand/uL 9 29   < > 10 97*   HEMOGLOBIN g/dL 7 5*   < > 8 0*   HEMATOCRIT % 24 0*   < > 25 2*   PLATELETS Thousands/uL 207   < > 281   NEUTROS PCT %  --   --  69   LYMPHS PCT %  --   --  17   MONOS PCT %  --   --  7   EOS PCT %  --   --  5    < > = values in this interval not displayed  Results from last 7 days   Lab Units 10/24/19  0808  10/20/19  0409   POTASSIUM mmol/L 3 9   < > 4 3   CHLORIDE mmol/L 99*   < > 99*   CO2 mmol/L 22   < > 31   BUN mg/dL 30*   < > 123*   CREATININE mg/dL 3 07*   < > 5 99*   CALCIUM mg/dL 8 6   < > 10 1   ALK PHOS U/L  --   --  157*   ALT U/L  --   --  42   AST U/L  --   --  38    < > = values in this interval not displayed  Results from last 7 days   Lab Units 10/20/19  0419   INR  1 09       * I Have Reviewed All Lab Data Listed Above  * Additional Pertinent Lab Tests Reviewed: [address] Admission Reviewed    Imaging:    Imaging Reports Reviewed Today Include:   Imaging Personally Reviewed by Myself Includes:      Recent Cultures (last 7 days):     Results from last 7 days   Lab Units 10/20/19  0029 10/20/19  0002   BLOOD CULTURE  No Growth at 72 hrs  No Growth at 72 hrs         Last 24 Hours Medication List:     Current Facility-Administered Medications:  acetaminophen 650 mg Oral Q6H PRN Francine Garcia MD   aspirin 81 mg Oral Daily Anna Sweet PA-C   atorvastatin 80 mg Oral Daily With [facility] EMANUEL Mayfield   azaTHIOprine 50 mg Oral Daily Anna Sweet PA-C   carvedilol 6 25 mg Oral BID With Meals Lonmorgan Fullers, DO   docusate sodium 100 mg Oral Daily Rosalva Taylor PA-C   heparin (porcine) 5,000 Units Subcutaneous Q8H [address] Rosalva Taylor PA-C   hydrALAZINE 50 mg Oral Q8H [address] Brien Cali, DO   insulin detemir 7 Units Subcutaneous Early Morning Aubree Lynch MD   insulin lispro 1-5 Units Subcutaneous HS Aubree Lynch MD   insulin lispro 1-5 Units Subcutaneous TID AC Travis Xiong MD   insulin lispro 3 Units Subcutaneous TID With Meals Le Luu MD   Labetalol HCl 10 mg Intravenous Q6H PRN Ruddy Bond PA-C   NIFEdipine ER 90 mg Oral Daily Brien Cali, DO   pantoprazole 40 mg Oral Early Morning Rosalva Taylor PA-C   predniSONE 5 mg Oral Daily Ruddy Bond PA-C   senna 1 tablet Oral HS Ruddy Bond PA-C   sevelamer 800 mg Oral TID With Meals Casie Taylor PA-C   tacrolimus 2 mg Oral HS Rosalva Taylor PA-C   tacrolimus 3 mg Oral QAM Ruddy Bond PA-C        Today, Patient Was Seen By: Oma Palmer MD    ** Please Note: Dictation voice to text software may have been used in the creation of this document   **

## 2019-10-24 NOTE — ASSESSMENT & PLAN NOTE
· Baseline hemoglobin between 7-8  · Currently at baseline  · Trend CBC  · Has history of GI bleed    Transfuse p r n for hemoglobin less than 7

## 2019-10-24 NOTE — HEMODIALYSIS
Pt stable on 3 1/2 hr HD tx  Pre tx wt= 49 4 kg, post wt= 48 5 kg  Total UF removal- 500 ml  No issues noted

## 2019-10-24 NOTE — ASSESSMENT & PLAN NOTE
· Status post renal transplant 2001  · Baseline creatinine 4-5  · Creatinine 4 5 today  · Patient was started on hemodialysis by Nephrology  Had another session today

## 2019-10-24 NOTE — ASSESSMENT & PLAN NOTE
· Likely secondary to acute pulmonary edema in setting of hypertensive emergency and Jamshid on CKD  · Initially required BiPAP and ICU admission  · Currently on nasal cannula  · Was also started on broad-spectrum antibiotics given high procalcitonin which can be falls in setting of acute kidney injury on CKD  · Patient was started on dialysis  On Monday    · O2 supplementation as needed to maintain O2 sat above 89%    · Infectious workup so far is negative, antibiotics discontinued yesterday  · Procalcitonin remained stable-likely secondary to chronic kidney disease

## 2019-10-24 NOTE — SOCIAL WORK
Discussed patient in care coordination rounds and patient is ready for dc home wheb HD schedule is complete  Called Nayana at [facility] central admissions and she is still awaiting clinical approval for [facility] and a schedule  She sent an email to Pippa [place] and will call CM tomorrow  Explained patient is ready for dc  Met with patient to explain above  He would prefer MWF schedule but if that is not available family can transport for TTS schedule

## 2019-10-24 NOTE — ASSESSMENT & PLAN NOTE
· Presented with hypertensive  emergency, now better controlled  · Continue with Toprol-XL 50 mg daily, Procardia XL 90 mg daily, hydralazine 50 mg q 8 hours  · P r n  Labetalol ordered  · Patient was hypotensive after dialysis yesterday    Monitor blood pressure with dialysis

## 2019-10-24 NOTE — PLAN OF CARE
To uf 1 l discussed with Nephrologist    Problem: METABOLIC, FLUID AND ELECTROLYTES - ADULT  Goal: Electrolytes maintained within normal limits  Description  INTERVENTIONS:  - Monitor labs and assess patient for signs and symptoms of electrolyte imbalances  - Administer electrolyte replacement as ordered  - Monitor response to electrolyte replacements, including repeat lab results as appropriate  - Instruct patient on fluid and nutrition as appropriate  Outcome: Progressing     Problem: METABOLIC, FLUID AND ELECTROLYTES - ADULT  Goal: Fluid balance maintained  Description  INTERVENTIONS:  - Monitor labs   - Monitor I/O and WT  - Instruct patient on fluid and nutrition as appropriate  - Assess for signs & symptoms of volume excess or deficit  Outcome: Progressing

## 2019-10-24 NOTE — PHYSICAL THERAPY NOTE
Physical Therapy Evaluation Note     Patient Name: Benjamin Cohen    BDIBV'Y Date: 10/24/2019     Problem List  Principal Problem:    Acute respiratory failure with hypoxia (HCC)  Active Problems:    Type 1 diabetes mellitus ([facility] )    Renal transplant, status post    Hypertension    Elevated troponin    Acute kidney injury superimposed on CKD (HCC)    Chronic anemia    Depressed left ventricular ejection fraction    Acute pulmonary edema (HCC)    Hx of right BKA (HCC)       Past Medical History  Past Medical History:   Diagnosis Date    Bacteremia 12/21/2018    Cardiac arrest ([facility]     Diabetes mellitus ([facility]     Diabetes mellitus type 1 ([facility]     GI bleed     Hypertension     Infection at site of external fixator pin ([facility] )     [facility]     Pneumonia     Last Assessed [id number]    Renal failure     Renal transplant, status post 07/21/2007        Past Surgical History  Past Surgical History:   Procedure Laterality Date    ANKLE FRACTURE SURGERY      ANKLE HARDWARE REMOVAL Right 7/31/2017    Procedure: REMOVAL HARDWARE ANKLE;  Surgeon: Jazmine Kimble MD;  Location: [facility];  Service: Orthopedics    ANKLE HARDWARE REMOVAL Right 8/17/2017    Procedure: TIBIA FAILED HARDWARE REMOVAL;  Surgeon: Jazmine Kimble MD;  [facility]: [facility];  Service: Orthopedics    CARDIAC SURGERY      2 stents    CLOSED REDUCTION ANKLE Right 7/3/2017    Procedure: CLOSED REDUCTION DISTAL TIB-FIB AND CASTING VS;  Surgeon: Jazmine Kimble MD;  [facility]: [facility];  Service: Orthopedics    ESOPHAGOGASTRODUODENOSCOPY N/A 12/20/2018    Procedure: ESOPHAGOGASTRODUODENOSCOPY (EGD) in ICU; Surgeon: Farzaneh Brush MD;  Location: [facility];   Service: Gastroenterology    EYE SURGERY      FRACTURE SURGERY      ORIF Rt Ankle    GLUTAMIC ACID DECARBOXYLASE (HISTORICAL)      IR [address] PLACEMENT  10/21/2019    IR PICC LINE  8/20/2018    IR VENOUS LINE REMOVAL  10/5/2018    LEG AMPUTATION Right 02/01/2019    Above the knee    NEPHRECTOMY TRANSPLANTED ORGAN      CO OPEN TREATMENT FRACTURE DISTAL TIBIA FIBULA Right 7/3/2017    Procedure: OPEN REDUCTION W/ INTERNAL FIXATION (ORIF); Surgeon: Sathya White MD;  Location: [facility];  Service: Orthopedics    TOE AMPUTATION Right 10/27/2016    Procedure: AMPUTATION TOE;  Surgeon: Ernie Ascencio DPM;  Location: [facility];  Service:            10/24/19 1641   Note Type   Note type Eval only   Pain Assessment   Pain Assessment No/denies pain   Pain Score No Pain   Home Living   Type of [address] One level   Additional Comments Pt lives at home with his wife in a 1 SH  Pt has 13 steps to the first floor if he enters through the garage  Pt was I prior for mobility and used a RW with his prosthetic but a w/c when he did not have his prosthetic on  Pt work full time  Pt's wife is able to assist if needed, she works full time as a nurse  Pt has negotiated the steps tot he first floor on his bottom if he needs to      Prior Function   Level of [place] Independent with ADLs and functional mobility   Lives With Spouse   Receives Help From Family   ADL Assistance Independent   IADLs Independent   Falls in the last 6 months 1 to 4   Vocational Full time employment   Restrictions/Precautions   Weight Bearing Precautions Per Order No   Braces or Orthoses Other (Comment)  (prothetic for R LE )   General   Family/Caregiver Present No   Cognition   Overall Cognitive Status WFL   Arousal/Participation Alert   Orientation Level Oriented X4   Memory Within functional limits   Following Commands Follows all commands and directions without difficulty   LLE Assessment   LLE Assessment X   LLE Overall AROM   L Hip Flexion 3+/5   L Knee Extension 3+/5   L Ankle Dorsiflexion 3-/5  (foot drop)   Bed Mobility   Additional Comments unable to asses due to being OOB   Transfers   Stand pivot 7  Independent   Balance Static Sitting Normal   Dynamic Sitting Normal   Static Standing Good   Dynamic Standing Good   Endurance Deficit   Endurance Deficit No   Activity Tolerance   Activity Tolerance Patient tolerated treatment well   Nurse Made Aware nurse approved therapy session   Assessment   Prognosis Excellent   Problem List Decreased mobility   Assessment Pt is a 50 yo male admitted to [place] [address] on 10/20/2019 s/p sudden onset SOB  Two patient identifiers were used to confirm  DX: acute respiratory failure with hypoxia, Hx of BKA on R LE, acute pulmonary edema, chronic anemia, acute kidney injury on CKD, elevated troponin  Pt lives at home with his wife in a [facility]  Pt has 15 steps to the first floor but can stay I the basement where he enters into the home  Pt works full time  Pt's wife can assist and also works full time as a nurse  Pt was I for ADL's and mobility prior  Pt has a prosthetic leg for his R LE however, did not have it with him  Pt commented that when he does not use his prosthetic leg he uses the w/c  Pt also uses a RW when he uses his prosthetic  Pt's impairments include reduced mobility  Pt will be d/c from therapy due to being at baseline function  Pt reported no concerns for function  Recommend discharge to home with family support  At the end of the session the patient was left in seated position with call bell and phone within reach  Pt was educated about post dialysis mobility and to consider using the w/c  Pt was I for transfers      Barriers to Discharge Decreased caregiver support   Recommendation   Recommendation Home with family support   PT - OK to Discharge Yes   Additional Comments when medically cleared    Modified [place] Scale   Modified [place] Scale 1   Barthel Index   Feeding 10   Bathing 5   Grooming Score 5   Dressing Score 10   Bladder Score 10   Bowels Score 10   Toilet Use Score 5   Transfers (Bed/Chair) Score 15   Mobility (Level Surface) Score 0   Stairs Score 0   Barthel Index Score 70 Judie Acosta, Pt, DPT

## 2019-10-24 NOTE — PROGRESS NOTES
NEPHROLOGY PROGRESS NOTE   Hemodialysis procedure note:  Jose Daniel Flowers 51 y o  male MRN: [id number]  Unit/Bed#: [facility] 529-01 Encounter: [id number]      ASSESSMENT    Pleasant 79-year-old male transferred from [facility]'s for respiratory distress has a history of living related donor transplant in 2001 has a previous transplant as well, type 1 diabetes mellitus and stage 5 CKD with a baseline creatinine of 4 5 who presented with hypertensive urgency and flash pulmonary edema     1  Acute kidney injury with a history of kidney transplant on  -1 year ago his creatinine was around 2 5-3, over the last 2 months he has had creatinine in the 5-6 range with over 3 hospital admission  -is interested in home modality in the long-term  -is a history of a living donor transplant from 2001  -follows with [place] for transplant and Nephrology  2  Stage 5 chronic kidney disease not yet on dialysis  -his baseline creatinine is around 4 5 but likely now with multiple acute kidney injury he has progression and his GFR is 10 mL per minute, given his 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 his GFR is overestimated  3  Pulmonary edema  -flash pulmonary edema in the setting of persistent hypertension  -improved respiratory status  4  Type 1 diabetes mellitus  -currently on insulin  5  History of GI bleed  -on PPI  6  Immunosuppressed  -currently on azathioprine 50 mg daily  -tacrolimus 3 mg a m  And 2 mg p m  7    Hypertension  -now better controlled    IMPRESSION & PLAN    Hemodialysis procedure note:    3rd treatment was done today his blood pressures are now much better he is now on carvedilol 6 25 mg 2 times daily, hydralazine 50 mg 3 times daily, nifedipine 90 mg daily, clonidine discontinue  Tolerated ultrafiltration today on dialysis with hemodynamics are remained stable  He is now off oxygen  Awaiting outpatient placement  Does not appear to be any contraindication will initiate Epogen and will check iron studies      SUBJECTIVE:    Patient was seen today on dialysis tolerating treatment without any difficulty    OBJECTIVE:  Current Weight: Weight - Scale: 52 1 kg (114 lb 14 4 oz)  Vitals:    10/24/19 1130   BP: 168/95   Pulse: 81   Resp: 18   Temp: 98 2 °F (36 8 °C)   SpO2:        Intake/Output Summary (Last 24 hours) at 10/24/2019 1205  Last data filed at 10/24/2019 1130  Gross per 24 hour   Intake 1828 ml   Output 1946 ml   Net -118 ml       General: conscious, cooperative, in no acute distress  Eyes: conjunctivae pink, anicteric sclerae  ENT: lips and mucous membranes moist  Neck: supple, no JVD  Chest: clear breath sounds bilateral, no crackles, ronchus or wheezings  CVS: normal rate, regular rhythm  Abdomen: soft, non-tender, non-distended, normoactive bowel sounds  Extremities:  Above-the-knee amputation on the right  Skin: no rash  Neuro: awake, alert, oriented  Psych:  Pleasant affect      Medications:    Current Facility-Administered Medications:     acetaminophen (TYLENOL) tablet 650 mg, 650 mg, Oral, Q6H PRN, Tiffanie Villalobos MD, 650 mg at 10/22/19 0538    aspirin chewable tablet 81 mg, 81 mg, Oral, Daily, Renee Barr PA-C, Stopped at 10/24/19 1043    atorvastatin (LIPITOR) tablet 80 mg, 80 mg, Oral, Daily With Estephania Santamaria PA-C, 80 mg at 10/23/19 1745    azaTHIOprine (IMURAN) tablet 50 mg, 50 mg, Oral, Daily, Renee Barr PA-C, Stopped at 10/24/19 1043    carvedilol (COREG) tablet 6 25 mg, 6 25 mg, Oral, BID With Meals, Franca Ax DO, Stopped at 10/24/19 0738    docusate sodium (COLACE) capsule 100 mg, 100 mg, Oral, Daily, Renee Barr PA-C, Stopped at 10/24/19 1043    heparin (porcine) subcutaneous injection 5,000 Units, 5,000 Units, Subcutaneous, Q8H [address], 5,000 Units at 10/24/19 0537 **AND** [CANCELED] Platelet count, , , Once, Rae Cortés MD    hydrALAZINE (APRESOLINE) tablet 50 mg, 50 mg, Oral, Q8H [address], Briendeng Cali DO, 50 mg at 10/24/19 0537    insulin detemir (LEVEMIR) subcutaneous injection 7 Units, 7 Units, Subcutaneous, Early Morning, Aubree Lynch MD, 7 Units at 10/24/19 0537    insulin lispro (HumaLOG) 100 units/mL subcutaneous injection 1-5 Units, 1-5 Units, Subcutaneous, HS, Lazaro Buitrago MD, 1 Units at 10/23/19 2119    insulin lispro (HumaLOG) 100 units/mL subcutaneous injection 1-5 Units, 1-5 Units, Subcutaneous, TID AC, 4 Units at 10/24/19 4278 **AND** Fingerstick Glucose (POCT), , , TID AC, Travis Xiong MD    insulin lispro (HumaLOG) 100 units/mL subcutaneous injection 3 Units, 3 Units, Subcutaneous, TID With Meals, Edilberto Mackay MD, 3 Units at 10/24/19 0720    Labetalol HCl (NORMODYNE) injection 10 mg, 10 mg, Intravenous, Q6H PRN, Pam Correa PA-C, 10 mg at 10/23/19 0208    NIFEdipine (PROCARDIA XL) 24 hr tablet 90 mg, 90 mg, Oral, Daily, [place] List, DO, Stopped at 10/24/19 1044    pantoprazole (PROTONIX) EC tablet 40 mg, 40 mg, Oral, Early Morning, Rosalva Taylor PA-C, 40 mg at 10/24/19 3474    predniSONE tablet 5 mg, 5 mg, Oral, Daily, Pam Correa PA-C, Stopped at 10/24/19 1043    senna (SENOKOT) tablet 8 6 mg, 1 tablet, Oral, HS, Rosalva Taylor PA-C, 8 6 mg at 10/23/19 2117    sevelamer (RENAGEL) tablet 800 mg, 800 mg, Oral, TID With Meals, Pam Correa PA-C, Stopped at 10/24/19 0738    tacrolimus (PROGRAF) capsule 2 mg, 2 mg, Oral, HS, Rosalva Taylor PA-C, 2 mg at 10/23/19 2120    tacrolimus (PROGRAF) capsule 3 mg, 3 mg, Oral, QAM, Pam Correa PA-C, Stopped at 10/24/19 1043    Invasive Devices:      Lab Results:   Results from last 7 days   Lab Units 10/24/19  0808 10/23/19  0515 10/22/19  0859 10/21/19  0511 10/20/19  1628 10/20/19  0419 10/20/19  0409 10/20/19  0029 10/20/19  0002 10/19/19  2300   WBC Thousand/uL 9 29 9 41 10 97* 10 00  --  10 90*  --   --   --  15 39*   HEMOGLOBIN g/dL 7 5* 7 9* 8 0* 7 6* 7 8* 7 3*  --   --   --  9 6*   HEMATOCRIT % 24 0* 25 0* 25 2* 23 9* 24 0* 22 8*  -- --   --  29 9*   PLATELETS Thousands/uL 207 262 281 281  --  299  --   --   --  472*   POTASSIUM mmol/L 3 9 4 2 4 0 4 2  --   --  4 3  --   --  3 4*   CHLORIDE mmol/L 99* 100 97* 99*  --   --  99*  --   --  111*   CO2 mmol/L 22 25 25 28  --   --  31  --   --  18*   BUN mg/dL 30* 65* 123* 108*  --   --  123*  --   --  78*   CREATININE mg/dL 3 07* 4 50* 6 52* 6 01*  --   --  5 99*  --   --  3 44*   CALCIUM mg/dL 8 6 8 5 8 7 9 1  --   --  10 1  --   --  5 4*   MAGNESIUM mg/dL  --   --   --   --   --   --  2 3  --   --  2 3   PHOSPHORUS mg/dL  --   --   --   --   --   --  7 1*  --   --   --    ALK PHOS U/L  --   --   --   --   --   --  157*  --   --  131*   ALT U/L  --   --   --   --   --   --  42  --   --  26   AST U/L  --   --   --   --   --   --  38  --   --  55*   BLOOD CULTURE   --   --   --   --   --   --   --  No Growth at 72 hrs  No Growth at 72 hrs    --        Previous work up:  Please see previous note

## 2019-10-24 NOTE — CONSULTS
The patient's vancomycin therapy has been completed/ discontinued  Thank you for allowing us to take part in this patient's care  Pharmacy will sign off now

## 2019-10-25 VITALS
WEIGHT: 114.9 LBS | HEIGHT: 64 IN | RESPIRATION RATE: 18 BRPM | SYSTOLIC BLOOD PRESSURE: 115 MMHG | HEART RATE: 77 BPM | BODY MASS INDEX: 19.62 KG/M2 | OXYGEN SATURATION: 99 % | DIASTOLIC BLOOD PRESSURE: 61 MMHG | TEMPERATURE: 97.6 F

## 2019-10-25 DIAGNOSIS — Z71.89 COMPLEX CARE COORDINATION: Primary | ICD-10-CM

## 2019-10-25 LAB
ALDOST SERPL-MCNC: 6.9 NG/DL (ref 0–30)
BACTERIA BLD CULT: NORMAL
BACTERIA BLD CULT: NORMAL
GLUCOSE SERPL-MCNC: 330 MG/DL (ref 65–140)
GLUCOSE SERPL-MCNC: 377 MG/DL (ref 65–140)
METANEPH FREE SERPL-MCNC: 41 PG/ML (ref 0–62)
NORMETANEPHRINE SERPL-MCNC: 33 PG/ML (ref 0–145)
RENIN PLAS-CCNC: 0.47 NG/ML/HR (ref 0.17–5.38)

## 2019-10-25 PROCEDURE — 99239 HOSP IP/OBS DSCHRG MGMT >30: CPT | Performed by: FAMILY MEDICINE

## 2019-10-25 PROCEDURE — 82948 REAGENT STRIP/BLOOD GLUCOSE: CPT

## 2019-10-25 PROCEDURE — 99232 SBSQ HOSP IP/OBS MODERATE 35: CPT | Performed by: INTERNAL MEDICINE

## 2019-10-25 RX ORDER — NIFEDIPINE 90 MG/1
90 TABLET, EXTENDED RELEASE ORAL DAILY
Qty: 30 TABLET | Refills: 0 | Status: SHIPPED | OUTPATIENT
Start: 2019-10-26 | End: 2019-11-19 | Stop reason: SDUPTHER

## 2019-10-25 RX ORDER — CARVEDILOL 6.25 MG/1
6.25 TABLET ORAL 2 TIMES DAILY WITH MEALS
Qty: 60 TABLET | Refills: 0 | Status: SHIPPED | OUTPATIENT
Start: 2019-10-25 | End: 2019-11-19 | Stop reason: SDUPTHER

## 2019-10-25 RX ORDER — HYDRALAZINE HYDROCHLORIDE 50 MG/1
50 TABLET, FILM COATED ORAL EVERY 8 HOURS SCHEDULED
Qty: 30 TABLET | Refills: 0 | Status: SHIPPED | OUTPATIENT
Start: 2019-10-25 | End: 2019-10-31 | Stop reason: SDUPTHER

## 2019-10-25 RX ADMIN — INSULIN DETEMIR 7 UNITS: 100 INJECTION, SOLUTION SUBCUTANEOUS at 06:05

## 2019-10-25 RX ADMIN — INSULIN LISPRO 3 UNITS: 100 INJECTION, SOLUTION INTRAVENOUS; SUBCUTANEOUS at 11:18

## 2019-10-25 RX ADMIN — DOCUSATE SODIUM 100 MG: 100 CAPSULE, LIQUID FILLED ORAL at 08:46

## 2019-10-25 RX ADMIN — AZATHIOPRINE 50 MG: 50 TABLET ORAL at 08:50

## 2019-10-25 RX ADMIN — HYDRALAZINE HYDROCHLORIDE 50 MG: 50 TABLET ORAL at 14:21

## 2019-10-25 RX ADMIN — INSULIN LISPRO 3 UNITS: 100 INJECTION, SOLUTION INTRAVENOUS; SUBCUTANEOUS at 11:20

## 2019-10-25 RX ADMIN — PANTOPRAZOLE SODIUM 40 MG: 40 TABLET, DELAYED RELEASE ORAL at 05:40

## 2019-10-25 RX ADMIN — CARVEDILOL 6.25 MG: 6.25 TABLET, FILM COATED ORAL at 08:46

## 2019-10-25 RX ADMIN — NIFEDIPINE 90 MG: 60 TABLET, FILM COATED, EXTENDED RELEASE ORAL at 08:46

## 2019-10-25 RX ADMIN — HEPARIN SODIUM 5000 UNITS: 5000 INJECTION INTRAVENOUS; SUBCUTANEOUS at 05:39

## 2019-10-25 RX ADMIN — ASPIRIN 81 MG 81 MG: 81 TABLET ORAL at 08:46

## 2019-10-25 RX ADMIN — PREDNISONE 5 MG: 5 TABLET ORAL at 08:46

## 2019-10-25 RX ADMIN — INSULIN LISPRO 4 UNITS: 100 INJECTION, SOLUTION INTRAVENOUS; SUBCUTANEOUS at 08:45

## 2019-10-25 RX ADMIN — SEVELAMER HYDROCHLORIDE 800 MG: 800 TABLET, FILM COATED PARENTERAL at 09:35

## 2019-10-25 RX ADMIN — SEVELAMER HYDROCHLORIDE 800 MG: 800 TABLET, FILM COATED PARENTERAL at 11:19

## 2019-10-25 RX ADMIN — INSULIN DETEMIR 7 UNITS: 100 INJECTION, SOLUTION SUBCUTANEOUS at 11:18

## 2019-10-25 RX ADMIN — TACROLIMUS 3 MG: 1 CAPSULE ORAL at 09:35

## 2019-10-25 RX ADMIN — INSULIN LISPRO 3 UNITS: 100 INJECTION, SOLUTION INTRAVENOUS; SUBCUTANEOUS at 08:48

## 2019-10-25 RX ADMIN — HYDRALAZINE HYDROCHLORIDE 50 MG: 50 TABLET ORAL at 06:02

## 2019-10-25 NOTE — PROGRESS NOTES
Cardiology Progress Note - Ed Yates 51 y o  male MRN: [id number]    Unit/Bed#: [facility] 529-01 Encounter: [id number]      Assessment:  Principal Problem:    Acute respiratory failure with hypoxia ([facility] )  Active Problems:    Type 1 diabetes mellitus ([facility] [address] )    Renal transplant, status post    Hypertension    Elevated troponin    Acute kidney injury superimposed on CKD (HCC)    Chronic anemia    Depressed left ventricular ejection fraction    Acute pulmonary edema (HCC)    Hx of right BKA ([facility] )      Plan:  1  HFpEF- Type 4 Cardiorenal syndrome  - appears volemic  - being arranged for outpatient dialysis  - Appointment scheduled with Dr Raul Brock as outpatient on Dec 18 at 5 PM    2  Hypertension  - Labile blood pressure with drastic changes with volume removal, however off dialysis he needs all the antihypertensives  For now, will discharge him on the current regimen    3  Coronary artery disease with PCI in 2017  - on ASA 81 mg daily, Atorvastatin 80 mg daily- will be changed to Crestor at discharge due to interaction with Tacrolimus    4  Non MI related troponin elevation    5  MIKE on CKD 5 with history of living donor transplant in 2001  - on Prednisone, tacrolimus, azathioprine    6  Type I Diabetes  - on insulin therapy  - A1C-6 3    7  Dyslipidemia  - Feb 2019- LDL-41, HDL-37    8  Normocytic anemia- ACD        Subjective:   Patient seen and examined  No significant events overnight  Improved shortness of breath, no chest pain or pressure  Improved lower leg swelling        Objective:     Vitals: Blood pressure 152/68, pulse 95, temperature 98 5 °F (36 9 °C), temperature source Oral, resp  rate 18, height 5' 4" (1 626 m), weight 52 1 kg (114 lb 14 4 oz), SpO2 99 %  , Body mass index is 19 72 kg/m² ,   Orthostatic Blood Pressures      Most Recent Value   Blood Pressure  152/68 filed at 10/25/2019 0700   Patient Position - Orthostatic VS  Sitting filed at 10/24/2019 1456            Intake/Output Summary (Last 24 hours) at 10/25/2019 1308  Last data filed at 10/25/2019 1201  Gross per 24 hour   Intake 342 ml   Output 425 ml   Net -83 ml         Physical Exam:    GEN: Andrei Pearl appears well, alert and oriented x 3, pleasant and cooperative   HEENT: anicteric, mucous membranes moist  NECK: no jvd, carotid bruits   HEART: regular rhythm, normal S1 and S2, no murmurs, clicks, gallops or rubs   LUNGS: diminished at the bases bilaterally   ABDOMEN: normal bowel sounds, soft, no tenderness, no distention  EXTREMITIES: peripheral pulses normal; trace edema of bilateral lower extremities  NEURO: no focal findings   SKIN: normal without suspicious lesions on exposed skin      Current Facility-Administered Medications:     acetaminophen (TYLENOL) tablet 650 mg, 650 mg, Oral, Q6H PRN, Lito Pino MD, 650 mg at 10/22/19 0538    aspirin chewable tablet 81 mg, 81 mg, Oral, Daily, Elzbieta Taylor PA-C, 81 mg at 10/25/19 0846    atorvastatin (LIPITOR) tablet 80 mg, 80 mg, Oral, Daily With Dinner, Ruddy Bond PA-C, 80 mg at 10/24/19 1825    azaTHIOprine (IMURAN) tablet 50 mg, 50 mg, Oral, Daily, Rosalva Taylor PA-C, 50 mg at 10/25/19 0850    carvedilol (COREG) tablet 6 25 mg, 6 25 mg, Oral, BID With Meals, Lonzell Closs, DO, 6 25 mg at 10/25/19 0846    docusate sodium (COLACE) capsule 100 mg, 100 mg, Oral, Daily, Rosalva Taylor PA-C, 100 mg at 10/25/19 0846    heparin (porcine) subcutaneous injection 5,000 Units, 5,000 Units, Subcutaneous, Q8H [facility], 5,000 Units at 10/25/19 0539 **AND** [CANCELED] Platelet count, , , Once, Reinier Walker MD    hydrALAZINE (APRESOLINE) tablet 50 mg, 50 mg, Oral, Q8H [facility], Brien Cali DO, 50 mg at 10/25/19 0602    insulin detemir (LEVEMIR) subcutaneous injection 7 Units, 7 Units, Subcutaneous, Q12H [facility], Oma Palmer MD    insulin lispro (HumaLOG) 100 units/mL subcutaneous injection 1-5 Units, 1-5 Units, Subcutaneous, HS, Julien Moore MD, 2 Units at 10/24/19 2202    insulin lispro (HumaLOG) 100 units/mL subcutaneous injection 1-5 Units, 1-5 Units, Subcutaneous, TID AC, 3 Units at 10/25/19 1118 **AND** Fingerstick Glucose (POCT), , , TID AC, Sg Ronquillo MD    insulin lispro (HumaLOG) 100 units/mL subcutaneous injection 3 Units, 3 Units, Subcutaneous, TID With Meals, Sg Ronquillo MD, 3 Units at 10/25/19 1120    Labetalol HCl (NORMODYNE) injection 10 mg, 10 mg, Intravenous, Q6H PRN, Lucky Mohs, PA-C, 10 mg at 10/23/19 0208    NIFEdipine (PROCARDIA XL) 24 hr tablet 90 mg, 90 mg, Oral, Daily, Brien Cali, DO, 90 mg at 10/25/19 0846    pantoprazole (PROTONIX) EC tablet 40 mg, 40 mg, Oral, Early Morning, Rosalva Taylor PA-C, 40 mg at 10/25/19 0540    predniSONE tablet 5 mg, 5 mg, Oral, Daily, Matias Taylor PA-C, 5 mg at 10/25/19 0846    senna (SENOKOT) tablet 8 6 mg, 1 tablet, Oral, HS, Rosalva Taylor PA-C, 8 6 mg at 10/24/19 2201    sevelamer (RENAGEL) tablet 800 mg, 800 mg, Oral, TID With Meals, Lucky Mohs, PA-C, 800 mg at 10/25/19 1119    tacrolimus (PROGRAF) capsule 2 mg, 2 mg, Oral, HS, Rosalva Taylor PA-C, 2 mg at 10/24/19 2202    tacrolimus (PROGRAF) capsule 3 mg, 3 mg, Oral, QAM, Rosalva Taylor PA-C, 3 mg at 10/25/19 0935    Labs & Results:    Lab Results   Component Value Date    CKTOTAL 60 12/29/2018    CKTOTAL 88 12/19/2018    CKTOTAL 94 10/26/2016    TROPONINI 0 11 (H) 10/20/2019    TROPONINI 0 11 (H) 10/20/2019    TROPONINI 0 05 (H) 10/19/2019       Lab Results   Component Value Date    GLUCOSE 460 (H) 02/14/2018    CALCIUM 8 6 10/24/2019     11/06/2015    K 3 9 10/24/2019    CO2 22 10/24/2019    CL 99 (L) 10/24/2019    BUN 30 (H) 10/24/2019    CREATININE 3 07 (H) 10/24/2019       Lab Results   Component Value Date    WBC 9 29 10/24/2019    HGB 7 5 (L) 10/24/2019    HCT 24 0 (L) 10/24/2019    MCV 95 10/24/2019     10/24/2019     Results from last 7 days   Lab Units 10/20/19  0419   INR  1 09       Lab Results   Component Value Date    CHOL 105 08/18/2015     Lab Results   Component Value Date    HDL 37 (L) 02/15/2019    HDL 41 06/23/2018     Lab Results   Component Value Date    LDLCALC 41 02/15/2019    LDLCALC 41 06/23/2018     Lab Results   Component Value Date    TRIG 86 02/15/2019    TRIG 120 06/23/2018       Lab Results   Component Value Date    ALT 42 10/20/2019    AST 38 10/20/2019

## 2019-10-25 NOTE — NURSING NOTE
Discharge instructions reviewed with pt and family- questions answered- discharged to home with family

## 2019-10-25 NOTE — SOCIAL WORK
Spoke to [place], [facility] admissions and Emmie Chaney to confirm the patient has HD schedule TTS 11:15 and can start Saturday, 10/16  He is on wait list for MWF schedule  Informed the patient, Kathrin renal and Dr Trey Cavanaugh  The patient will discharge home today

## 2019-10-25 NOTE — PROGRESS NOTES
NEPHROLOGY PROGRESS NOTE   Doug Bryant 51 y o  male MRN: [id number]  Unit/Bed#: [facility] 529-01 Encounter: [id number]      ASSESSMENT/PLAN:  1  MIKE on CKD 5 with history of LRRT-- now progressed to ESRD: 1 year ago creatinine was 2 5-3 but over the past few months has been 5-6  · Patient was started on HD this admission due to pulmonary edema   · Would prefer home HD long term and I will discuss this with his dialysis unit today   · Had LRRT in 2001 at [facility] and will need to follow up with them to see if immunosuppression should be decreased now that he is on HD   · Current immunosuppression: azathioprine 50mg daily, program 3mg am and 2mg pm, and prednisone 5mg daily   · Continue HD TTS-- next treatment tomorrow  2  Access: R CVC  Will need outpatient vascular follow up   3  Flash pulmonary edema: improved with volume removal on HD   4  Type I Diabetes   5  Hypertension: BP ok today   6  Anemia of CKD: hgb 7 5 yesterday  Checking iron studies today   7  Mineral Bone Disease of CKD: on renagel with meals      Dispo: outpatient HD set up TTS Aleksander Welsh  Ok to be d/c today from a renal standpoint and go to [facility] tomorrow for dialysis     SUBJECTIVE:  Feeling well today and ready to go home  Wants to do home HD in the near future  No issues with HD        OBJECTIVE:  Current Weight: Weight - Scale: 52 1 kg (114 lb 14 4 oz)  Vitals:    10/25/19 0700   BP: 152/68   Pulse: 95   Resp: 18   Temp: 98 5 °F (36 9 °C)   SpO2: 99%       Intake/Output Summary (Last 24 hours) at 10/25/2019 1139  Last data filed at 10/25/2019 0901  Gross per 24 hour   Intake 582 ml   Output 500 ml   Net 82 ml     General: no acute distress   Skin: no rash  Eyes: sclera anicteric   ENT: moist mucous membranes   Neck: no JVD  Chest: clear to auscultation    CVS: regular rate and rhtyhm  Abdomen: soft, non-tender, non-distended    Extremities: +1 LLE ankle edema, R AKA    Neuro: awake and alert  Psych: appropriate affect      Medications:  Scheduled Meds:  Current Facility-Administered Medications:  acetaminophen 650 mg Oral Q6H PRN Ligia Pace MD   aspirin 81 mg Oral Daily David Davis PA-C   atorvastatin 80 mg Oral Daily With Dare Nohelia Taylor PA-C   azaTHIOprine 50 mg Oral Daily David Davis PA-C   carvedilol 6 25 mg Oral BID With Meals Dina Castro,    docusate sodium 100 mg Oral Daily Rosalva Taylor PA-C   heparin (porcine) 5,000 Units Subcutaneous Q8H [facility] Rosalva Taylor PA-C   hydrALAZINE 50 mg Oral Q8H [facility] Briendeng Cali, DO   insulin detemir 7 Units Subcutaneous Q12H [facility] John Flores MD   insulin lispro 1-5 Units Subcutaneous HS Aubree Lynch MD   insulin lispro 1-5 Units Subcutaneous TID AC Travis Xiong MD   insulin lispro 3 Units Subcutaneous TID With Meals Duane Nelson MD   Labetalol HCl 10 mg Intravenous Q6H PRN David Davis PA-C   NIFEdipine ER 90 mg Oral Daily Briendeng Cali DO   pantoprazole 40 mg Oral Early Morning David Davis PA-C   predniSONE 5 mg Oral Daily David Davis PA-C   senna 1 tablet Oral HS David Davis PA-C   sevelamer 800 mg Oral TID With Meals David Davis PA-C   tacrolimus 2 mg Oral HS Jamila Taylor PA-C   tacrolimus 3 mg Oral QAM David Davis PA-C       PRN Meds:   acetaminophen    Labetalol HCl    Laboratory Results:  Results from last 7 days   Lab Units 10/24/19  0808 10/23/19  0515 10/22/19  0859 10/21/19  0511 10/20/19  1628 10/20/19  0419 10/20/19  0409 10/19/19  2300   WBC Thousand/uL 9 29 9 41 10 97* 10 00  --  10 90*  --  15 39*   HEMOGLOBIN g/dL 7 5* 7 9* 8 0* 7 6* 7 8* 7 3*  --  9 6*   HEMATOCRIT % 24 0* 25 0* 25 2* 23 9* 24 0* 22 8*  --  29 9*   PLATELETS Thousands/uL 207 262 281 281  --  299  --  472*   SODIUM mmol/L 131* 135* 133* 137  --   --  139 137   POTASSIUM mmol/L 3 9 4 2 4 0 4 2  --   --  4 3 3 4*   CHLORIDE mmol/L 99* 100 97* 99*  --   --  99* 111*   CO2 mmol/L 22 25 25 28  --   --  31 18*   BUN mg/dL 30* 65* 123* 108*  --   --  123* 78*   CREATININE mg/dL 3 07* 4 50* 6 52* 6 01*  --   --  5 99* 3 44*   CALCIUM mg/dL 8 6 8 5 8 7 9 1  --   --  10 1 5 4*   MAGNESIUM mg/dL  --   --   --   --   --   --  2 3 2 3   PHOSPHORUS mg/dL  --   --   --   --   --   --  7 1*  --

## 2019-10-28 ENCOUNTER — TRANSITIONAL CARE MANAGEMENT (OUTPATIENT)
Dept: FAMILY MEDICINE CLINIC | Facility: CLINIC | Age: 49
End: 2019-10-28

## 2019-10-28 NOTE — DISCHARGE SUMMARY
Discharge Summary - [facility] Internal Medicine    Patient Information: Orestes Hernández 51 y o  male MRN: [id number]  Unit/Bed#: [facility] 529-01 Encounter: [id number]    Discharging Physician / Practitioner: Amy Perez MD  PCP: Joss Eddy DO  Admission Date: 10/20/2019  Discharge Date: 10/25/19  Disposition:     Home    Reason for Admission:  Acute respiratory failure secondary to fluid overload    Discharge Diagnoses:     Principal Problem:    Acute respiratory failure with hypoxia ([facility] [address] [facility] )  Active Problems:    Type 1 diabetes mellitus ([facility] )    Renal transplant, status post    Hypertension    Elevated troponin    Acute kidney injury superimposed on CKD ([facility] )    Chronic anemia    Depressed left ventricular ejection fraction    Acute pulmonary edema ([facility] )    Hx of right BKA ([facility] )  Resolved Problems:    * No resolved hospital problems  *      Consultations During Hospital Stay:  · Nephrology  · Cardiology   · Endocrinology    Procedures Performed:   PermCath placement and initiation of dialysis    Significant Findings / Test Results:     · Chest x-ray 10/19/2019-dense right greater than left bilateral perihilar airspace opacities suspicious for bilateral pneumonia superimposed upon a background of probable pulmonary vascular congestive changes    Incidental Findings:   ·     Test Results Pending at Discharge (will require follow up):   ·      Outpatient Tests Requested:  ·     Complications:  none    Hospital Course:     Orestes Hernández is a 51 y o  male patient who originally presented to the hospital on 10/20/2019 due to shortness of breath  Patient initially presented to [facility] with acute shortness of breath  Patient's pulse ox was  found to be  80% and was started on BiPAP  Patient was also started on nitroglycerin drip in the emergency room  Chest x-ray was concerning for pneumonia so the patient received antibiotics and also received treatment for pulmonary edema    Patient was transferred to the [facility] under critical care service  Patient was continued on the BiPAP and a nitroglycerin drip with dot will by Nephrology  Patient with acute kidney injury on CKD stage 5  At the time of presentation to the hospital his creatinine was 5 6 2 and it next day the creatinine was 5 99 with evidence of hypervolemia  Cardiology also followed the patient in the hospital and Nephrology recommended renal replacement therapy since he was not making any significant improvement with diuresis  Patient with history of renal transplant in the past and he was continued on his tacrolimus  his creatinine peaked at 6 52  Nephrology decided to start on dialysis and he had permacath placement on 10/21/19and started on dialysis  If she is infectious workup was negative and the antibiotics was discontinued after 48 hours once the cultures were negative  Patient' sprocalcitonin remained elevated but remained within the range for dialysis patient  with the initiation of dialysis patient  Respiratory status and uremia improved  case management was able to set up his dialysis placement the was outpatient dot patient was discharged home in a stable condition on the next day in [facility]  Patient remained hemodynamically stable and afebrile    Patient with known history of type 1 diabetes mellitus and endocrinology followed the patient during the hospitalization and managed the insulin  For details refer to the chart    Condition at Discharge: good     Discharge Day Visit / Exam:     Subjective:  Patient seen and examined  No specific complaints offered  Patient reported that he is okay to go home with the on the next day    Reported that his wife is going to pick himup  Vitals: Blood Pressure: 115/61 (10/25/19 1612)  Pulse: 77 (10/25/19 1612)  Temperature: 97 6 °F (36 4 °C) (10/25/19 1612)  Temp Source: Oral (10/25/19 1612)  Respirations: 18 (10/25/19 1612)  Height: 5' 4" (162 6 cm) (10/20/19 0300)  Weight - Scale: 52 1 kg (114 lb 14 4 oz) (10/22/19 0301)  SpO2: 99 % (10/25/19 0700)  Exam:   Physical Exam   Constitutional: No distress  HENT:   Head: Normocephalic and atraumatic  Eyes: Pupils are equal, round, and reactive to light  Neck: Normal range of motion  No thyromegaly present  Cardiovascular: Normal rate  Pulmonary/Chest: Effort normal    Abdominal: Soft  Musculoskeletal: Normal range of motion  Neurological: He is alert  Skin: He is not diaphoretic  Discussion with Family:  Wife updated in detail the day before    Discharge instructions/Information to patient and family:   See after visit summary for information provided to patient and family  Provisions for Follow-Up Care:  See after visit summary for information related to follow-up care and any pertinent home health orders  Planned Readmission:  none     Discharge Statement:  I spent 45 minutes discharging the patient  This time was spent on the day of discharge  I had direct contact with the patient on the day of discharge  Greater than 50% of the total time was spent examining patient, answering all patient questions, arranging and discussing plan of care with patient as well as directly providing post-discharge instructions  Additional time then spent on discharge activities  Discharge Medications:  See after visit summary for reconciled discharge medications provided to patient and family        ** Please Note: This note has been constructed using a voice recognition system **

## 2019-10-28 NOTE — UTILIZATION REVIEW
Notification of Discharge  This is a Notification of Discharge from our facility [address]  Please be advised that this patient has been discharge from our facility  Below you will find the admission and discharge date and time including the patients disposition  PRESENTATION DATE: 10/20/2019  2:21 AM  OBS ADMISSION DATE:   IP ADMISSION DATE: 10/20/19 0221   DISCHARGE DATE: 10/25/2019  5:25 PM  DISPOSITION: Home/Self Care Home/Self Care   Admission Orders listed below:  Admission Orders (From admission, onward)     Ordered        10/20/19 0236  Inpatient Admission  Once                   Please contact the UR Department if additional information is required to close this patient's authorization/case  [address]  Utilization Review Department  Phone: [phone]; Fax [phone]  [email or web address]  ATTENTION: Please call with any questions or concerns to [phone]  and carefully listen to the prompts so that you are directed to the right person  Send all requests for admission clinical reviews, approved or denied determinations and any other requests to fax [phone]   All voicemails are confidential

## 2019-10-31 DIAGNOSIS — I10 HYPERTENSION: ICD-10-CM

## 2019-10-31 RX ORDER — HYDRALAZINE HYDROCHLORIDE 50 MG/1
50 TABLET, FILM COATED ORAL EVERY 8 HOURS SCHEDULED
Qty: 270 TABLET | Refills: 1 | Status: SHIPPED | OUTPATIENT
Start: 2019-10-31 | End: 2019-11-07 | Stop reason: SDUPTHER

## 2019-11-01 ENCOUNTER — TRANSCRIBE ORDERS (OUTPATIENT)
Dept: NEPHROLOGY | Facility: CLINIC | Age: 49
End: 2019-11-01

## 2019-11-01 DIAGNOSIS — N18.9 CHRONIC KIDNEY DISEASE, UNSPECIFIED CKD STAGE: Primary | ICD-10-CM

## 2019-11-01 DIAGNOSIS — N18.9 CHRONIC KIDNEY DISEASE, UNSPECIFIED: Primary | ICD-10-CM

## 2019-11-04 ENCOUNTER — HOSPITAL ENCOUNTER (OUTPATIENT)
Dept: NON INVASIVE DIAGNOSTICS | Facility: HOSPITAL | Age: 49
Discharge: HOME | End: 2019-11-04
Attending: INTERNAL MEDICINE
Payer: COMMERCIAL

## 2019-11-04 DIAGNOSIS — N18.9 CHRONIC KIDNEY DISEASE, UNSPECIFIED CKD STAGE: ICD-10-CM

## 2019-11-04 PROCEDURE — G0365 VESSEL MAPPING HEMO ACCESS: HCPCS

## 2019-11-04 PROCEDURE — G0365 VESSEL MAPPING HEMO ACCESS: HCPCS | Performed by: SURGERY

## 2019-11-07 DIAGNOSIS — I10 HYPERTENSION: ICD-10-CM

## 2019-11-07 RX ORDER — HYDRALAZINE HYDROCHLORIDE 50 MG/1
50 TABLET, FILM COATED ORAL EVERY 8 HOURS SCHEDULED
Qty: 270 TABLET | Refills: 1 | Status: SHIPPED | OUTPATIENT
Start: 2019-11-07 | End: 2020-08-25 | Stop reason: ALTCHOICE

## 2019-11-19 DIAGNOSIS — N18.5 STAGE 5 CHRONIC KIDNEY DISEASE NOT ON CHRONIC DIALYSIS (HCC): ICD-10-CM

## 2019-11-19 DIAGNOSIS — K92.0 GASTROINTESTINAL HEMORRHAGE WITH HEMATEMESIS: ICD-10-CM

## 2019-11-19 DIAGNOSIS — N18.5 TYPE 1 DIABETES MELLITUS WITH STAGE 5 CHRONIC KIDNEY DISEASE NOT ON CHRONIC DIALYSIS (HCC): ICD-10-CM

## 2019-11-19 DIAGNOSIS — I10 HYPERTENSION: ICD-10-CM

## 2019-11-19 DIAGNOSIS — E10.22 TYPE 1 DIABETES MELLITUS WITH STAGE 5 CHRONIC KIDNEY DISEASE NOT ON CHRONIC DIALYSIS (HCC): ICD-10-CM

## 2019-11-19 RX ORDER — PANTOPRAZOLE SODIUM 40 MG/1
40 TABLET, DELAYED RELEASE ORAL
Qty: 90 TABLET | Refills: 1 | Status: SHIPPED | OUTPATIENT
Start: 2019-11-19 | End: 2020-05-11

## 2019-11-19 RX ORDER — NIFEDIPINE 90 MG/1
90 TABLET, EXTENDED RELEASE ORAL DAILY
Qty: 90 TABLET | Refills: 1 | Status: SHIPPED | OUTPATIENT
Start: 2019-11-19 | End: 2020-05-11

## 2019-11-19 RX ORDER — CARVEDILOL 6.25 MG/1
6.25 TABLET ORAL 2 TIMES DAILY WITH MEALS
Qty: 180 TABLET | Refills: 1 | Status: SHIPPED | OUTPATIENT
Start: 2019-11-19 | End: 2020-05-11

## 2019-12-01 ENCOUNTER — APPOINTMENT (EMERGENCY)
Dept: RADIOLOGY | Facility: HOSPITAL | Age: 49
End: 2019-12-01
Payer: COMMERCIAL

## 2019-12-01 ENCOUNTER — HOSPITAL ENCOUNTER (OUTPATIENT)
Facility: HOSPITAL | Age: 49
Setting detail: OBSERVATION
LOS: 1 days | Discharge: HOME | End: 2019-12-02
Attending: EMERGENCY MEDICINE | Admitting: INTERNAL MEDICINE
Payer: COMMERCIAL

## 2019-12-01 DIAGNOSIS — J81.1 PULMONARY EDEMA: ICD-10-CM

## 2019-12-01 DIAGNOSIS — Z94.0 RENAL TRANSPLANT, STATUS POST: ICD-10-CM

## 2019-12-01 DIAGNOSIS — I16.0 HYPERTENSIVE URGENCY: Primary | ICD-10-CM

## 2019-12-01 DIAGNOSIS — J81.0 ACUTE PULMONARY EDEMA (HCC): ICD-10-CM

## 2019-12-01 LAB
ALBUMIN SERPL BCP-MCNC: 3.3 G/DL (ref 3.5–5)
ALP SERPL-CCNC: 143 U/L (ref 46–116)
ALT SERPL W P-5'-P-CCNC: 21 U/L (ref 12–78)
ANION GAP SERPL CALCULATED.3IONS-SCNC: 8 MMOL/L (ref 4–13)
AST SERPL W P-5'-P-CCNC: 19 U/L (ref 5–45)
BASE EX.OXY STD BLDV CALC-SCNC: 69.7 % (ref 60–80)
BASE EXCESS BLDV CALC-SCNC: -1.5 MMOL/L
BASOPHILS # BLD AUTO: 0.13 THOUSANDS/ΜL (ref 0–0.1)
BASOPHILS NFR BLD AUTO: 1 % (ref 0–1)
BETA-HYDROXYBUTYRATE: 0.3 MMOL/L
BILIRUB SERPL-MCNC: 0.9 MG/DL (ref 0.2–1)
BUN SERPL-MCNC: 33 MG/DL (ref 5–25)
CALCIUM SERPL-MCNC: 8.7 MG/DL (ref 8.3–10.1)
CHLORIDE SERPL-SCNC: 104 MMOL/L (ref 100–108)
CO2 SERPL-SCNC: 27 MMOL/L (ref 21–32)
CREAT SERPL-MCNC: 3.08 MG/DL (ref 0.6–1.3)
EOSINOPHIL # BLD AUTO: 0.56 THOUSAND/ΜL (ref 0–0.61)
EOSINOPHIL NFR BLD AUTO: 4 % (ref 0–6)
ERYTHROCYTE [DISTWIDTH] IN BLOOD BY AUTOMATED COUNT: 15.5 % (ref 11.6–15.1)
GFR SERPL CREATININE-BSD FRML MDRD: 22 ML/MIN/1.73SQ M
GLUCOSE SERPL-MCNC: 146 MG/DL (ref 65–140)
GLUCOSE SERPL-MCNC: 351 MG/DL (ref 65–140)
GLUCOSE SERPL-MCNC: 472 MG/DL (ref 65–140)
HCO3 BLDV-SCNC: 23.7 MMOL/L (ref 24–30)
HCT VFR BLD AUTO: 38 % (ref 36.5–49.3)
HGB BLD-MCNC: 11.9 G/DL (ref 12–17)
IMM GRANULOCYTES # BLD AUTO: 0.08 THOUSAND/UL (ref 0–0.2)
IMM GRANULOCYTES NFR BLD AUTO: 1 % (ref 0–2)
LYMPHOCYTES # BLD AUTO: 2.4 THOUSANDS/ΜL (ref 0.6–4.47)
LYMPHOCYTES NFR BLD AUTO: 15 % (ref 14–44)
MAGNESIUM SERPL-MCNC: 2.3 MG/DL (ref 1.6–2.6)
MCH RBC QN AUTO: 31.1 PG (ref 26.8–34.3)
MCHC RBC AUTO-ENTMCNC: 31.3 G/DL (ref 31.4–37.4)
MCV RBC AUTO: 99 FL (ref 82–98)
MONOCYTES # BLD AUTO: 1.09 THOUSAND/ΜL (ref 0.17–1.22)
MONOCYTES NFR BLD AUTO: 7 % (ref 4–12)
NEUTROPHILS # BLD AUTO: 11.63 THOUSANDS/ΜL (ref 1.85–7.62)
NEUTS SEG NFR BLD AUTO: 72 % (ref 43–75)
NRBC BLD AUTO-RTO: 0 /100 WBCS
NT-PROBNP SERPL-MCNC: ABNORMAL PG/ML
O2 CT BLDV-SCNC: 12.9 ML/DL
PCO2 BLDV: 42.1 MM HG (ref 42–50)
PH BLDV: 7.37 [PH] (ref 7.3–7.4)
PHOSPHATE SERPL-MCNC: 3.8 MG/DL (ref 2.7–4.5)
PLATELET # BLD AUTO: 308 THOUSANDS/UL (ref 149–390)
PMV BLD AUTO: 8.2 FL (ref 8.9–12.7)
PO2 BLDV: 39 MM HG (ref 35–45)
POTASSIUM SERPL-SCNC: 4.5 MMOL/L (ref 3.5–5.3)
PROT SERPL-MCNC: 7.1 G/DL (ref 6.4–8.2)
RBC # BLD AUTO: 3.83 MILLION/UL (ref 3.88–5.62)
SODIUM SERPL-SCNC: 139 MMOL/L (ref 136–145)
TROPONIN I SERPL-MCNC: 0.05 NG/ML
WBC # BLD AUTO: 15.89 THOUSAND/UL (ref 4.31–10.16)

## 2019-12-01 PROCEDURE — 80053 COMPREHEN METABOLIC PANEL: CPT | Performed by: EMERGENCY MEDICINE

## 2019-12-01 PROCEDURE — 71046 X-RAY EXAM CHEST 2 VIEWS: CPT

## 2019-12-01 PROCEDURE — 82010 KETONE BODYS QUAN: CPT | Performed by: EMERGENCY MEDICINE

## 2019-12-01 PROCEDURE — 84100 ASSAY OF PHOSPHORUS: CPT | Performed by: EMERGENCY MEDICINE

## 2019-12-01 PROCEDURE — 36415 COLL VENOUS BLD VENIPUNCTURE: CPT | Performed by: EMERGENCY MEDICINE

## 2019-12-01 PROCEDURE — 99220 PR INITIAL OBSERVATION CARE/DAY 70 MINUTES: CPT | Performed by: NURSE PRACTITIONER

## 2019-12-01 PROCEDURE — 94664 DEMO&/EVAL PT USE INHALER: CPT

## 2019-12-01 PROCEDURE — 99285 EMERGENCY DEPT VISIT HI MDM: CPT | Performed by: EMERGENCY MEDICINE

## 2019-12-01 PROCEDURE — 85025 COMPLETE CBC W/AUTO DIFF WBC: CPT | Performed by: EMERGENCY MEDICINE

## 2019-12-01 PROCEDURE — 82948 REAGENT STRIP/BLOOD GLUCOSE: CPT

## 2019-12-01 PROCEDURE — 99285 EMERGENCY DEPT VISIT HI MDM: CPT

## 2019-12-01 PROCEDURE — 83735 ASSAY OF MAGNESIUM: CPT | Performed by: EMERGENCY MEDICINE

## 2019-12-01 PROCEDURE — 96374 THER/PROPH/DIAG INJ IV PUSH: CPT

## 2019-12-01 PROCEDURE — 93005 ELECTROCARDIOGRAM TRACING: CPT

## 2019-12-01 PROCEDURE — 83880 ASSAY OF NATRIURETIC PEPTIDE: CPT | Performed by: EMERGENCY MEDICINE

## 2019-12-01 PROCEDURE — 84484 ASSAY OF TROPONIN QUANT: CPT | Performed by: EMERGENCY MEDICINE

## 2019-12-01 PROCEDURE — 82805 BLOOD GASES W/O2 SATURATION: CPT | Performed by: EMERGENCY MEDICINE

## 2019-12-01 RX ORDER — LABETALOL 20 MG/4 ML (5 MG/ML) INTRAVENOUS SYRINGE
10 ONCE
Status: COMPLETED | OUTPATIENT
Start: 2019-12-01 | End: 2019-12-01

## 2019-12-01 RX ORDER — TACROLIMUS 1 MG/1
2 CAPSULE ORAL
Status: DISCONTINUED | OUTPATIENT
Start: 2019-12-01 | End: 2019-12-02 | Stop reason: HOSPADM

## 2019-12-01 RX ORDER — NITROGLYCERIN 0.4 MG/1
0.4 TABLET SUBLINGUAL ONCE AS NEEDED
Status: DISCONTINUED | OUTPATIENT
Start: 2019-12-01 | End: 2019-12-02 | Stop reason: HOSPADM

## 2019-12-01 RX ORDER — TACROLIMUS 1 MG/1
3 CAPSULE ORAL EVERY MORNING
Status: DISCONTINUED | OUTPATIENT
Start: 2019-12-01 | End: 2019-12-02 | Stop reason: HOSPADM

## 2019-12-01 RX ORDER — HEPARIN SODIUM 5000 [USP'U]/ML
5000 INJECTION, SOLUTION INTRAVENOUS; SUBCUTANEOUS EVERY 8 HOURS SCHEDULED
Status: DISCONTINUED | OUTPATIENT
Start: 2019-12-01 | End: 2019-12-02 | Stop reason: HOSPADM

## 2019-12-01 RX ORDER — NIFEDIPINE 30 MG/1
90 TABLET, EXTENDED RELEASE ORAL DAILY
Status: DISCONTINUED | OUTPATIENT
Start: 2019-12-02 | End: 2019-12-02 | Stop reason: HOSPADM

## 2019-12-01 RX ORDER — METHYLPREDNISOLONE SOD SUCC 125 MG
1 VIAL (EA) INJECTION ONCE
Status: COMPLETED | OUTPATIENT
Start: 2019-12-01 | End: 2019-12-01

## 2019-12-01 RX ORDER — TACROLIMUS 1 MG/1
3 CAPSULE ORAL EVERY MORNING
Status: DISCONTINUED | OUTPATIENT
Start: 2019-12-02 | End: 2019-12-01

## 2019-12-01 RX ORDER — PANTOPRAZOLE SODIUM 40 MG/1
40 TABLET, DELAYED RELEASE ORAL
Status: DISCONTINUED | OUTPATIENT
Start: 2019-12-01 | End: 2019-12-02 | Stop reason: HOSPADM

## 2019-12-01 RX ORDER — ATORVASTATIN CALCIUM 40 MG/1
80 TABLET, FILM COATED ORAL DAILY
Status: DISCONTINUED | OUTPATIENT
Start: 2019-12-01 | End: 2019-12-02 | Stop reason: HOSPADM

## 2019-12-01 RX ORDER — AZATHIOPRINE 50 MG/1
50 TABLET ORAL DAILY
Status: DISCONTINUED | OUTPATIENT
Start: 2019-12-01 | End: 2019-12-02 | Stop reason: HOSPADM

## 2019-12-01 RX ORDER — METOPROLOL SUCCINATE 50 MG/1
50 TABLET, EXTENDED RELEASE ORAL DAILY
Status: DISCONTINUED | OUTPATIENT
Start: 2019-12-02 | End: 2019-12-02 | Stop reason: HOSPADM

## 2019-12-01 RX ORDER — HYDRALAZINE HYDROCHLORIDE 25 MG/1
50 TABLET, FILM COATED ORAL EVERY 8 HOURS SCHEDULED
Status: DISCONTINUED | OUTPATIENT
Start: 2019-12-01 | End: 2019-12-02 | Stop reason: HOSPADM

## 2019-12-01 RX ORDER — IPRATROPIUM BROMIDE AND ALBUTEROL SULFATE .5; 3 MG/3ML; MG/3ML
1 SOLUTION RESPIRATORY (INHALATION) ONCE
Status: COMPLETED | OUTPATIENT
Start: 2019-12-01 | End: 2019-12-01

## 2019-12-01 RX ORDER — ALBUTEROL SULFATE 2.5 MG/3ML
2.5 SOLUTION RESPIRATORY (INHALATION) EVERY 4 HOURS PRN
Status: DISCONTINUED | OUTPATIENT
Start: 2019-12-01 | End: 2019-12-02 | Stop reason: HOSPADM

## 2019-12-01 RX ORDER — PREDNISONE 1 MG/1
5 TABLET ORAL DAILY
Status: DISCONTINUED | OUTPATIENT
Start: 2019-12-01 | End: 2019-12-02 | Stop reason: HOSPADM

## 2019-12-01 RX ORDER — FUROSEMIDE 10 MG/ML
40 INJECTION INTRAMUSCULAR; INTRAVENOUS ONCE
Status: COMPLETED | OUTPATIENT
Start: 2019-12-01 | End: 2019-12-01

## 2019-12-01 RX ORDER — CARVEDILOL 3.12 MG/1
6.25 TABLET ORAL 2 TIMES DAILY WITH MEALS
Status: DISCONTINUED | OUTPATIENT
Start: 2019-12-01 | End: 2019-12-02 | Stop reason: HOSPADM

## 2019-12-01 RX ORDER — ASPIRIN 81 MG/1
81 TABLET, CHEWABLE ORAL DAILY
Status: DISCONTINUED | OUTPATIENT
Start: 2019-12-01 | End: 2019-12-02 | Stop reason: HOSPADM

## 2019-12-01 RX ORDER — LABETALOL 20 MG/4 ML (5 MG/ML) INTRAVENOUS SYRINGE
20 ONCE
Status: DISCONTINUED | OUTPATIENT
Start: 2019-12-01 | End: 2019-12-01

## 2019-12-01 RX ADMIN — LABETALOL 20 MG/4 ML (5 MG/ML) INTRAVENOUS SYRINGE 10 MG: at 11:13

## 2019-12-01 RX ADMIN — ASPIRIN 81 MG 81 MG: 81 TABLET ORAL at 15:16

## 2019-12-01 RX ADMIN — PREDNISONE 5 MG: 5 TABLET ORAL at 15:16

## 2019-12-01 RX ADMIN — ATORVASTATIN CALCIUM 80 MG: 40 TABLET, FILM COATED ORAL at 18:17

## 2019-12-01 RX ADMIN — TACROLIMUS 2 MG: 1 CAPSULE ORAL at 21:15

## 2019-12-01 RX ADMIN — PANTOPRAZOLE SODIUM 40 MG: 40 TABLET, DELAYED RELEASE ORAL at 15:16

## 2019-12-01 RX ADMIN — HYDRALAZINE HYDROCHLORIDE 50 MG: 25 TABLET ORAL at 21:15

## 2019-12-01 RX ADMIN — INSULIN LISPRO 4 UNITS: 100 INJECTION, SOLUTION INTRAVENOUS; SUBCUTANEOUS at 16:07

## 2019-12-01 RX ADMIN — INSULIN LISPRO 5 UNITS: 100 INJECTION, SOLUTION INTRAVENOUS; SUBCUTANEOUS at 21:17

## 2019-12-01 RX ADMIN — HEPARIN SODIUM 5000 UNITS: 5000 INJECTION INTRAVENOUS; SUBCUTANEOUS at 21:16

## 2019-12-01 RX ADMIN — CARVEDILOL 6.25 MG: 3.12 TABLET, FILM COATED ORAL at 16:07

## 2019-12-01 RX ADMIN — TACROLIMUS 3 MG: 1 CAPSULE ORAL at 16:07

## 2019-12-01 RX ADMIN — AZATHIOPRINE 50 MG: 50 TABLET ORAL at 15:16

## 2019-12-01 RX ADMIN — HYDRALAZINE HYDROCHLORIDE 50 MG: 25 TABLET ORAL at 15:16

## 2019-12-01 RX ADMIN — INSULIN DETEMIR 7 UNITS: 100 INJECTION, SOLUTION SUBCUTANEOUS at 18:17

## 2019-12-01 RX ADMIN — FUROSEMIDE 40 MG: 10 INJECTION, SOLUTION INTRAMUSCULAR; INTRAVENOUS at 14:55

## 2019-12-01 RX ADMIN — NITROGLYCERIN 0.5 INCH: 20 OINTMENT TOPICAL at 13:43

## 2019-12-01 NOTE — ASSESSMENT & PLAN NOTE
Chief complaint of shortness of breath-sudden onset-also reports history of flash pulmonary edema over the past several months-patient states was hypoxic on room air has evidence by SPO2 in the mid 60s-chart review indicates SpO2 of 93 on 100% non-rebreather in emergency room  Chest x-ray collected-volume overload highly suspicion-not officially read to date  Patient transition to 3 L nasal cannula satting 93%  Physical exam reveals bilateral crackles throughout  Please see additional treatment plan for pulmonary edema

## 2019-12-01 NOTE — ASSESSMENT & PLAN NOTE
Pulmonary edema most likely flash pulmonary edema accompanied with hypertensive urgency 224/103  Chest x-ray highly suspicious for vascular congestion not officially read as of now  Lasix 40 mg IV pressure ordered  1/2 inch nitropaste ordered  Supplement O2 to SpO2 greater than 92%  Respiratory support  Respiratory protocol in place

## 2019-12-01 NOTE — ASSESSMENT & PLAN NOTE
Acute renal failure status post renal transplant required hemodialysis  Hemodialysis catheter right chest  Received hemodialysis yesterday states hemodialysis is on Tuesday  Nephrology will be consulted

## 2019-12-01 NOTE — H&P
H&P- Cj Roberts 1970, 47 y o  male MRN: [id number]    Unit/Bed#: 881-70 Encounter: [id number]    Primary Care Provider: Mayra Quintanilla DO   Date and time admitted to hospital: 12/1/2019  9:04 AM        * Acute respiratory failure with hypoxia [facility])  Assessment & Plan  Chief complaint of shortness of breath-sudden onset-also reports history of flash pulmonary edema over the past several months-patient states was hypoxic on room air has evidence by SPO2 in the mid 60s-chart review indicates SpO2 of 93 on 100% non-rebreather in emergency room  Chest x-ray collected-volume overload highly suspicion-not officially read to date  Patient transition to 3 L nasal cannula satting 93%  Physical exam reveals bilateral crackles throughout  Please see additional treatment plan for pulmonary edema    Hypertensive urgency  Assessment & Plan  As evidence by blood pressure 224/103 in the emergency room  Patient was given labetalol  Patient reports compliance with prior to admission Coreg and Toprol  Patient will be giving 1/2 inch nitropaste  Admit to [facility] with telemetry- monitor per protocol  Continue prior to admission medications  Blood pressure is trending down nicely    Acute pulmonary edema (HCC)  Assessment & Plan  Pulmonary edema most likely flash pulmonary edema accompanied with hypertensive urgency 224/103  Chest x-ray highly suspicious for vascular congestion not officially read as of now  Lasix 40 mg IV pressure ordered  1/2 inch nitropaste ordered  Supplement O2 to SpO2 greater than 92%  Respiratory support  Respiratory protocol in place    Acute renal failure (ARF) ([facility] [address] )  Assessment & Plan  Acute renal failure status post renal transplant required hemodialysis  Hemodialysis catheter right chest  Received hemodialysis yesterday states hemodialysis is on Tuesday  Nephrology will be consulted    Renal transplant, status post  Assessment & Plan  Status post renal transplant  Continue prior to admission Prograf and Imuran  Nephrology consulted    Type 1 diabetes mellitus [facility])  Assessment & Plan  Lab Results   Component Value Date    HGBA1C 4 8 10/11/2019       No results for input(s): POCGLU in the last 72 hours  Blood Sugar Average: Last 72 hrs:     Accu-Cheks a c  HS  Continue prior to admission long-acting insulin  Insulin sliding scale            VTE Prophylaxis: Heparin  / sequential compression device   Code Status: FULL  POLST: POLST is not applicable to this patient    Anticipated Length of Stay:  Patient will be admitted on an Observation basis with an anticipated length of stay of  Less than 2 midnights  Justification for Hospital Stay:  Hypertension urgency and pulmonary edema    Total Time for Visit, including Counseling / Coordination of Care: 45 minutes  Greater than 50% of this total time spent on direct patient counseling and coordination of care  Chief Complaint:   Sudden onset of shortness of breath also reports additional history of flash pulmonary edema over the past few months    History of Present Illness:    Trenton Vergara is a 47 y o  male who presented to the emergency room via EMS for sudden onset of shortness of breath  Patient reports being at home come short of breath utilizing nebulizers reports SpO2 of 63-64 EMS arrival patient was placed on 100% non-rebreather upon entry to the emergency room patient was 93% on a non-rebreather  Patient has a past medical history including flash pulmonary edema since the beginning of dialysis approximately 2 months ago  In addition to hypertension renal transplant-hemodialysis patient Tuesday Thursday Saturday additional history of an MI, diabetes type 1, images and labs were collected emergency room-see below  Significant findings was elevated blood pressure of 224/103 and a chest x-ray very concerned for pulmonary edema    Patient will be admitted for observation, nephrology will be consulted, patient was given 1/2 inch nitropaste in addition to 40 mg IV push Lasix  Review of Systems:    Review of Systems   Constitutional:        Under eye swelling   Respiratory: Positive for shortness of breath  All other systems reviewed and are negative  Past Medical and Surgical History:     Past Medical History:   Diagnosis Date    Bacteremia 12/21/2018    Cardiac arrest [facility] [address] )     Diabetes mellitus ([facility] [address] )     Diabetes mellitus type 1 ([facility] [address] )     Dialysis patient [facility])     Access in right chest    GI bleed     Hypertension     Infection at site of external fixator pin ([facility] [address] )     MI (myocardial infarction) ([facility] [address] )     Pneumonia     Last Assessed [id number]    Renal failure     Renal transplant, status post 07/21/2007       Past Surgical History:   Procedure Laterality Date    ANKLE FRACTURE SURGERY      ANKLE HARDWARE REMOVAL Right 7/31/2017    Procedure: REMOVAL HARDWARE ANKLE;  Surgeon: Milton Johnson MD;  Location: [facility];  Service: Orthopedics    ANKLE HARDWARE REMOVAL Right 8/17/2017    Procedure: TIBIA FAILED HARDWARE REMOVAL;  Surgeon: Mitlon Johnson MD;  Location: [facility];  Service: Orthopedics    CARDIAC SURGERY      2 stents    CLOSED REDUCTION ANKLE Right 7/3/2017    Procedure: CLOSED REDUCTION DISTAL TIB-FIB AND CASTING VS;  Surgeon: Milton Johnson MD;  Location: [facility];  Service: Orthopedics    ESOPHAGOGASTRODUODENOSCOPY N/A 12/20/2018    Procedure: ESOPHAGOGASTRODUODENOSCOPY (EGD) in ICU; Surgeon: Sagar Monahan MD;  Location: [facility]; Service: Gastroenterology    EYE SURGERY      FRACTURE SURGERY      ORIF Rt Ankle    GLUTAMIC ACID DECARBOXYLASE (HISTORICAL)      IR PERMACATH PLACEMENT  10/21/2019    IR PICC LINE  8/20/2018    IR VENOUS LINE REMOVAL  10/5/2018    LEG AMPUTATION Right 02/01/2019    Above the knee    NEPHRECTOMY TRANSPLANTED ORGAN      MA OPEN TREATMENT FRACTURE DISTAL TIBIA FIBULA Right 7/3/2017    Procedure: OPEN REDUCTION W/ INTERNAL FIXATION (ORIF);   Surgeon: Milton Johnson MD;  Location: [facility];  Service: Orthopedics    TOE AMPUTATION Right 10/27/2016    Procedure: AMPUTATION TOE;  Surgeon: Brodie Paniagua DPM;  Location: [facility];  Service:        Meds/Allergies:    Prior to Admission medications    Medication Sig Start Date End Date Taking?  Authorizing Provider   albuterol (2 5 mg/3 mL) 0 083 % nebulizer solution Inhale 2 5 mg every 4 (four) hours as needed  2/8/19  Yes Historical Provider, MD   aspirin 81 mg chewable tablet Chew 81 mg daily   Yes Historical Provider, MD   atorvastatin (LIPITOR) 80 mg tablet Take 80 mg by mouth daily    Yes Historical Provider, MD   azaTHIOprine (IMURAN) 50 mg tablet Take 50 mg by mouth daily  10/18/18  Yes Historical Provider, MD   carvedilol (COREG) 6 25 mg tablet Take 1 tablet (6 25 mg total) by mouth 2 (two) times a day with meals 11/19/19  Yes Nicolasa Wilburn DO   Cholecalciferol 25 MCG (1000 UT) tablet Take 1 tablet (1,000 Units total) by mouth daily 11/19/19  Yes Nicolasa Wilburn DO   glucagon (GLUCAGON EMERGENCY) 1 MG injection Inject 1 mg under the skin once as needed for low blood sugar for up to 1 dose 10/18/19  Yes Nicolasa Wilburn DO   hydrALAZINE (APRESOLINE) 50 mg tablet Take 1 tablet (50 mg total) by mouth every 8 (eight) hours 11/7/19  Yes iNcolasa Wilburn DO   insulin detemir (LEVEMIR) 100 units/mL subcutaneous injection Inject 7 Units under the skin 2 (two) times a day   Yes Historical Provider, MD   insulin lispro (HumaLOG) 100 units/mL injection Inject 10 Units under the skin as needed    Yes Historical Provider, MD   metoprolol succinate (TOPROL-XL) 25 mg 24 hr tablet Take 2 tablets (50 mg total) by mouth daily 4/8/19  Yes Nicolasa Wilburn DO   NIFEdipine (PROCARDIA XL) 90 mg 24 hr tablet Take 1 tablet (90 mg total) by mouth daily 11/19/19  Yes Nicolasa Wilburn DO   pantoprazole (PROTONIX) 40 mg tablet Take 1 tablet (40 mg total) by mouth daily in the early morning 11/19/19  Yes Nicolasa Wilburn DO   predniSONE 5 mg tablet Take 5 mg by mouth daily   Yes Historical Provider, MD   tacrolimus (PROGRAF) 1 mg capsule Take 3 mg by mouth every morning   Yes Historical Provider, MD   tacrolimus (PROGRAF) 1 mg capsule Take 2 mg by mouth daily at bedtime   Yes Historical Provider, MD   ONE TOUCH ULTRA TEST test strip CHECK BLOOD SUGAR 5 TO 6 TIMES DAILY 11/19/19   Riddhi Garza DO   calcitriol (ROCALTROL) 0 5 MCG capsule Take 1 mcg by mouth daily Monday Wednesday, Friday 12/1/19  Historical Provider, MD   sevelamer (RENAGEL) 800 mg tablet Take 1 tablet (800 mg total) by mouth 3 (three) times a day with meals 10/15/19 12/1/19  Laure Watt MD     I have reviewed home medications with patient personally  Allergies: No Known Allergies    Social History:     Marital Status: /Civil Union   Occupation:  Noncontributory  Patient Pre-hospital Living Situation:  Independent  Patient Pre-hospital Level of Mobility:  Limited  Patient Pre-hospital Diet Restrictions:  Diabetic  Substance Use History:   Social History     Substance and Sexual Activity   Alcohol Use Not Currently     Social History     Tobacco Use   Smoking Status Never Smoker   Smokeless Tobacco Never Used     Social History     Substance and Sexual Activity   Drug Use Never       Family History:    Family History   Problem Relation Age of Onset    Diabetes Brother     Coronary artery disease Mother        Physical Exam:     Vitals:   Blood Pressure: 148/73 (12/01/19 1400)  Pulse: 81 (12/01/19 1400)  Temperature: 98 6 °F (37 °C) (12/01/19 1400)  Temp Source: Oral (12/01/19 1400)  Respirations: 18 (12/01/19 1400)  Height: 5' 4" (162 6 cm) (12/01/19 1400)  Weight - Scale: 52 9 kg (116 lb 10 oz) (12/01/19 0910)  SpO2: 93 % (12/01/19 1400)    Physical Exam   Constitutional: He is oriented to person, place, and time  He appears well-developed and well-nourished  HENT:   Head: Normocephalic and atraumatic  Eyes: Pupils are equal, round, and reactive to light   EOM are normal    Neck: Normal range of motion  Neck supple  Cardiovascular: Normal rate, regular rhythm, normal heart sounds and intact distal pulses  Pulmonary/Chest: Effort normal  He has rales  Abdominal: Soft  Bowel sounds are normal    Musculoskeletal: He exhibits edema (orbital edema )  He exhibits no deformity  Right AKA     Neurological: He is alert and oriented to person, place, and time  No cranial nerve deficit  Coordination normal    Skin: Skin is warm and dry  Capillary refill takes less than 2 seconds  No rash noted  No erythema  No pallor  Psychiatric: He has a normal mood and affect  His behavior is normal  Judgment and thought content normal        Additional Data:     Lab Results: I have personally reviewed pertinent reports  Results from last 7 days   Lab Units 12/01/19  0926   WBC Thousand/uL 15 89*   HEMOGLOBIN g/dL 11 9*   HEMATOCRIT % 38 0   PLATELETS Thousands/uL 308   NEUTROS PCT % 72   LYMPHS PCT % 15   MONOS PCT % 7   EOS PCT % 4     Results from last 7 days   Lab Units 12/01/19  0926   POTASSIUM mmol/L 4 5   CHLORIDE mmol/L 104   CO2 mmol/L 27   BUN mg/dL 33*   CREATININE mg/dL 3 08*   CALCIUM mg/dL 8 7   ALK PHOS U/L 143*   ALT U/L 21   AST U/L 19           Imaging: I have personally reviewed pertinent reports  Vas Vessel Mapping For Hemodialysis Upper    Result Date: 11/4/2019  Narrative:  [facility] REPORT CLINICAL: Indications: Pre-Op evaluation for future dialysis AVF  Patient has chronic kidney disease  Patient is Right handed and has a right subclavian dialysis port  Operative History: 2019-02-01 Leg amputation (Right) Cardiac surgery Right Nephrectomy with transplant Risk Factors The patient has history of HTN, Diabetes, and CKD  The patient current BMI is 19 57, Weight is 106 lb and height is 64 in  Clinical Right Pressure:  138/64 mm Hg, Left Pressure:  128/68 mm Hg    FINDINGS:  Right                   Impression     Diameter AP (mm)  Depth (mm)  PSV (cm/s)  Subclavian 260  Brachial Artery         Calcification               4 1                     141  Dist  Radial Artery     Calcification               1 5                      70  Axillary                                                                    108  Prox  Radial            Calcification               1 5                     106  Dist  Ulnar Artery      Calcification               1 8                     139  Mid Radial              Calcification               1 7                      78  Prox  Ulnar             Calcification               3 0                      83  Mid   Ulnar              Calcification               2 5                     120  Bas Upper                                           6 0         5 7              Median Cubital V                                    1 2         9 7              Bas Mid Arm                                         4 6         6 6              Basilic Lower Arm                                   1 8         3 9              Bas AC                                              1 8         3 3              Basilic Upper Forearm                               1 3         2 2              Bas Mid Forearm                                     0 9         1 8              Basilic Lower Forearm                               0 9         1 5              Bas Wrist                                           0 8         2 4              Ceph Upper                                          2 4         5 4              Ceph Mid Arm                                        2 0         4 8              Cephalic Lower Arm                                  1 9         4 3              Ceph AC                                             2 8         4 6              Cephalic Upper Forearm                              1 3         4 0              Ceph Mid Forearm                                    0 9         3 2              Cephalic Lower Forearm 1 0         2 9               Left                    Impression     Diameter AP (mm)  Depth (mm)  PSV (cm/s)  Subclavian                                                                  279  Brachial Artery         Calcification               4 0                     123  Dist  Radial Artery     Calcification               1 2                      77  Axillary                                                                    135  Prox  Radial            Calcification               2 0                      88  Dist  Ulnar Artery      Calcification               2 1                      90  Mid Radial              Calcification               1 5                     121  Prox  Ulnar             Calcification               3 4                      95  Mid   Ulnar              Calcification               2 6                     105  Bas Upper                                           7 7         3 8              Median Cubital V                                    2 3        11 4              Bas Mid Arm                                         0 5         6 2              Basilic Lower Arm                                   1 5         3 3              Bas AC                                              1 1         1 7              Basilic Upper Forearm                               1 0         2 4              Bas Mid Forearm                                     0 9         1 7              Basilic Lower Forearm                               0 6         2 6              Bas Wrist                                           0 8         2 0              Ceph Upper                                          2 0         5 8              Ceph Mid Arm                                        1 8         5 3              Cephalic Lower Arm                                  1 9         3 6              Ceph AC                                             3 8         3 6              Cephalic Upper Forearm 1 7         3 9              [facility] Mid Forearm                                    1 5         4 8              Cephalic Lower Forearm                              1 8         2 7              [facility] Wrist                                          1 5         1 9                 CONCLUSION:  Impression RIGHT ARM: The cephalic vein communicates with the median cubital vein  The cephalic vein is of adequate caliber = and >2mm throughout the upper arm  Multiple branches are noted throughout the arm  The basilic vein is of inadequate caliber  Multiple branches are noted throughout the arm  The brachial artery measures 4 1 mm in its greatest diameter  Multiple branches are noted coming off the brachial artery  The subclavian, axillary and brachial arteries are widely patent  The radial and ulnar arteries are patent and bifurcate at the antecubital fossa  Calcification is noted  Evaluation shows patent and thrombus free cephalic vein and basilic vein  Thickened walls are noted  The IJV, subclavian, axillary and brachial veins are patent  LEFT ARM: The cephalic vein communicates with the median cubital vein  The cephalic vein is of inadequate caliber  Multiple branches are noted throughout the arm  The basilic vein is of inadequate caliber  Multiple branches are noted throughout the arm  The brachial artery measures 4 mm in its greatest diameter  The subclavian, axillary and brachial arteries are widely patent  The radial and ulnar arteries are patent and bifurcate at the antecubital fossa  Calcification is noted  Evaluation shows patent and thrombus free cephalic vein and basilic vein  Thickened walls are noted  The IJV, subclavian, axillary and brachial veins are patent  SIGNATURE: Electronically Signed by: Juan Carlos Perkins on 2019-11-04 07:50:40 PM      [facility] / Care Everywhere Records Reviewed: Yes     ** Please Note: This note has been constructed using a voice recognition system   **

## 2019-12-01 NOTE — ASSESSMENT & PLAN NOTE
As evidence by blood pressure 224/103 in the emergency room  Patient was given labetalol  Patient reports compliance with prior to admission Coreg and Toprol  Patient will be giving 1/2 inch nitropaste  Admit to [facility] surge with telemetry- monitor per protocol  Continue prior to admission medications  Blood pressure is trending down nicely

## 2019-12-01 NOTE — RESPIRATORY THERAPY NOTE
RT Protocol Note  Glen Ying 47 y o  male MRN: [id number]  Unit/Bed#: 532-50 Encounter: [id number]    Assessment    Principal Problem:    Acute respiratory failure with hypoxia ([place] [address] )  Active Problems:    Type 1 diabetes mellitus ([facility] )    Renal transplant, status post    Acute renal failure (ARF) ([facility] )    Acute pulmonary edema (HCC)    Hypertensive urgency      Home Pulmonary Medications:  ALBUTEROL 0 083% neb Q4 PRN  Home Devices/Therapy: Other (Comment)(uses Albuterol neb prn at home)    Past Medical History:   Diagnosis Date    Bacteremia 12/21/2018    Cardiac arrest ([place] [address] )     Diabetes mellitus ([place] [address] )     Diabetes mellitus type 1 ([place] [address] )     Dialysis patient ([place] [address] )     Access in right chest    GI bleed     Hypertension     Infection at site of external fixator pin ([place] [address] )     MI (myocardial infarction) ([place] [address] )     Pneumonia     Last Assessed [id number]    Renal failure     Renal transplant, status post 07/21/2007     Social History     Socioeconomic History    Marital status: /Civil Union     Spouse name: None    Number of children: None    Years of education: None    Highest education level: None   Occupational History    None   Social Needs    Financial resource strain: None    Food insecurity:     Worry: None     Inability: None    Transportation needs:     Medical: None     Non-medical: None   Tobacco Use    Smoking status: Never Smoker    Smokeless tobacco: Never Used   Substance and Sexual Activity    Alcohol use: Not Currently    Drug use: Never    Sexual activity: Yes     Partners: Female   Lifestyle    Physical activity:     Days per week: None     Minutes per session: None    Stress: None   Relationships    Social connections:     Talks on phone: None     Gets together: None     Attends Yazdanism service: None     Active member of club or organization: None     Attends meetings of clubs or organizations: None     Relationship status: None    Intimate partner violence: Fear of current or ex partner: None     Emotionally abused: None     Physically abused: None     Forced sexual activity: None   Other Topics Concern    None   Social History Narrative    No living will       Subjective         Objective    Physical Exam:   Assessment Type: Assess only  General Appearance: Alert, Awake  Respiratory Pattern: Normal  Chest Assessment: Chest expansion symmetrical  Bilateral Breath Sounds: Diminished  R Breath Sounds: Crackles(RLL)  Cough: None  O2 Device: 1 5 l/m NC    Vitals:  Blood pressure 149/72, pulse 78, temperature 98 6 °F (37 °C), temperature source Oral, resp  rate 16, height 5' 4" (1 626 m), weight 53 1 kg (117 lb 1 oz), SpO2 96 %  Imaging and other studies: I have personally reviewed pertinent reports  O2 Device: 1 5 l/m NC     Plan  Bronchodilator therapy Albuterol 0 083% Q4prn for SOB and Wheezing  Oxygen therapy titrated to room air             Resp Comments: pt states he knows to call for tx's if he needs

## 2019-12-01 NOTE — ED PROVIDER NOTES
History  Chief Complaint   Patient presents with    Shortness of Breath     Started with SOB this AM       Patient: Ed Yates  49 y o /male  YOB: 1970  MRN: [id number]  PCP: Ector Lagos DO  Date of evaluation: 12/1/2019    (KERRY Mejia may have been used in the preparation of this document  Occasional wrong word or "sound-alike" substitutions may have occurred due to the inherent limitations of voice recognition software  Interpretation should be guided by context )    Chief complaint is shortness of breath  Shortness of breath started this morning and almost immediately became severe  Yesterday he was not short of breath  He was perhaps sneezing a  He was able to complete his full course of dialysis yesterday  He has been going to dialysis for 1 month and doing well with that  History provided by:  Patient, EMS personnel and medical records  Shortness of Breath   Severity:  Severe  Onset quality:  Sudden  Timing:  Constant  Progression:  Unchanged  Chronicity:  Recurrent  Context: URI    Relieved by:  Nothing  Worsened by: Activity  Ineffective treatments: Albuterol nebulizer treatment  Associated symptoms: no abdominal pain, no chest pain, no cough, no diaphoresis, no fever, no neck pain, no rash, no sputum production and no vomiting        Prior to Admission Medications   Prescriptions Last Dose Informant Patient Reported? Taking?    Cholecalciferol 25 MCG (1000 UT) tablet   No Yes   Sig: Take 1 tablet (1,000 Units total) by mouth daily   NIFEdipine (PROCARDIA XL) 90 mg 24 hr tablet 12/1/2019 at Unknown time  No Yes   Sig: Take 1 tablet (90 mg total) by mouth daily   ONE TOUCH ULTRA TEST test strip   No No   Sig: CHECK BLOOD SUGAR 5 TO 6 TIMES DAILY   albuterol (2 5 mg/3 mL) 0 083 % nebulizer solution 12/1/2019 at Unknown time  Yes Yes   Sig: Inhale 2 5 mg every 4 (four) hours as needed    aspirin 81 mg chewable tablet   Yes Yes   Sig: Chew 81 mg daily atorvastatin (LIPITOR) 80 mg tablet   Yes Yes   Sig: Take 80 mg by mouth daily    azaTHIOprine (IMURAN) 50 mg tablet   Yes Yes   Sig: Take 50 mg by mouth daily    carvedilol (COREG) 6 25 mg tablet 12/1/2019 at Unknown time  No Yes   Sig: Take 1 tablet (6 25 mg total) by mouth 2 (two) times a day with meals   glucagon (GLUCAGON EMERGENCY) 1 MG injection   No Yes   Sig: Inject 1 mg under the skin once as needed for low blood sugar for up to 1 dose   hydrALAZINE (APRESOLINE) 50 mg tablet   No Yes   Sig: Take 1 tablet (50 mg total) by mouth every 8 (eight) hours   insulin detemir (LEVEMIR) 100 units/mL subcutaneous injection  Self Yes Yes   Sig: Inject 7 Units under the skin 2 (two) times a day   insulin lispro (HumaLOG) 100 units/mL injection  Self Yes Yes   Sig: Inject 10 Units under the skin daily   metoprolol succinate (TOPROL-XL) 25 mg 24 hr tablet 12/1/2019 at Unknown time  No Yes   Sig: Take 2 tablets (50 mg total) by mouth daily   pantoprazole (PROTONIX) 40 mg tablet   No Yes   Sig: Take 1 tablet (40 mg total) by mouth daily in the early morning   predniSONE 5 mg tablet   Yes Yes   Sig: Take 5 mg by mouth daily   tacrolimus (PROGRAF) 1 mg capsule  Pharmacy (Specify) Yes Yes   Sig: Take 3 mg by mouth every morning   tacrolimus (PROGRAF) 1 mg capsule   Yes Yes   Sig: Take 2 mg by mouth daily at bedtime      Facility-Administered Medications: None       Past Medical History:   Diagnosis Date    Bacteremia 12/21/2018    [facility]     [facility] mellitus [facility]     Diabetes mellitus type 1 ([facility]     Dialysis patient [facility])     Access in right chest    GI bleed     Hypertension     [facility]fection at site of external fixator pin [facility]     MI (myocardial infarction) ([facility]     Pneumonia     Last Assessed [id number]    Renal failure     Renal transplant, status post 07/21/2007       Past Surgical History:   Procedure Laterality Date    ANKLE FRACTURE SURGERY      ANKLE HARDWARE REMOVAL Right 7/31/2017 Procedure: REMOVAL HARDWARE ANKLE;  Surgeon: Alexey Lopez MD;  [facility]: [facility];  Service: Orthopedics    ANKLE HARDWARE REMOVAL Right 8/17/2017    Procedure: TIBIA FAILED HARDWARE REMOVAL;  Surgeon: Alexey Lopez MD;  [facility]: [facility];  Service: Orthopedics    CARDIAC SURGERY      2 stents    CLOSED REDUCTION ANKLE Right 7/3/2017    Procedure: CLOSED REDUCTION DISTAL TIB-FIB AND CASTING VS;  Surgeon: Alexey Lopez MD;  Location: [facility];  Service: Orthopedics    ESOPHAGOGASTRODUODENOSCOPY N/A 12/20/2018    Procedure: ESOPHAGOGASTRODUODENOSCOPY (EGD) in ICU; Surgeon: Mavis Le MD;  Location: [facility]; Service: Gastroenterology    EYE SURGERY      FRACTURE SURGERY      ORIF Rt Ankle    GLUTAMIC ACID DECARBOXYLASE (HISTORICAL)      IR PERMACATH PLACEMENT  10/21/2019    IR PICC LINE  8/20/2018    IR VENOUS LINE REMOVAL  10/5/2018    LEG AMPUTATION Right 02/01/2019    Above the knee    NEPHRECTOMY TRANSPLANTED ORGAN      TX OPEN TREATMENT FRACTURE DISTAL TIBIA FIBULA Right 7/3/2017    Procedure: OPEN REDUCTION W/ INTERNAL FIXATION (ORIF); Surgeon: Alexey Lopez MD;  Location: [facility];  Service: Orthopedics    TOE AMPUTATION Right 10/27/2016    Procedure: AMPUTATION TOE;  Surgeon: Velva Bence, DPM;  Location: [facility];  Service:        Family History   Problem Relation Age of Onset    Diabetes Brother     Coronary artery disease Mother      I have reviewed and agree with the history as documented  Social History     Tobacco Use    Smoking status: Never Smoker    Smokeless tobacco: Never Used   Substance Use Topics    Alcohol use: Not Currently    Drug use: Never        Review of Systems   Constitutional: Negative  Negative for chills, diaphoresis and fever  HENT: Negative  Negative for trouble swallowing and voice change  Eyes: Negative for photophobia and visual disturbance  Respiratory: Positive for shortness of breath   Negative for cough and sputum production  Cardiovascular: Negative  Negative for chest pain, palpitations and leg swelling  Gastrointestinal: Negative  Negative for abdominal pain and vomiting  Endocrine: Negative  Negative for polydipsia and polyuria  Genitourinary: Negative  Negative for difficulty urinating and urgency  Musculoskeletal: Negative  Negative for back pain and neck pain  Skin: Negative  Negative for rash and wound  Allergic/Immunologic: Negative for immunocompromised state  Neurological: Negative  Negative for speech difficulty and weakness  Hematological: Negative  Negative for adenopathy  Does not bruise/bleed easily  All other systems reviewed and are negative  Physical Exam  Physical Exam   Constitutional: He is oriented to person, place, and time  He appears well-developed and well-nourished  HENT:   Mouth/Throat: Oropharynx is clear and moist and mucous membranes are normal    Voice normal   Eyes: Pupils are equal, round, and reactive to light  EOM are normal    Cardiovascular: Normal rate and regular rhythm  Pulmonary/Chest: Effort normal  He has decreased breath sounds  He has rales in the right lower field, the left middle field and the left lower field  Abdominal: Soft  Bowel sounds are normal    Neurological: He is alert and oriented to person, place, and time  GCS eye subscore is 4  GCS verbal subscore is 5  GCS motor subscore is 6  Skin: Skin is warm and dry  Psychiatric: He has a normal mood and affect  His speech is normal and behavior is normal    Nursing note and vitals reviewed        Vital Signs  ED Triage Vitals   Temperature Pulse Respirations Blood Pressure SpO2   12/01/19 0910 12/01/19 0910 12/01/19 0910 12/01/19 0910 12/01/19 0910   97 6 °F (36 4 °C) 82 (!) 24 (!) 224/103 93 %      Temp src Heart Rate Source Patient Position - Orthostatic VS BP Location FiO2 (%)   -- 12/01/19 0910 12/01/19 0910 12/01/19 0910 --    Monitor Sitting Right arm       Pain Score 12/01/19 0945       No Pain           Vitals:    12/01/19 1030 12/01/19 1100 12/01/19 1119 12/01/19 1122   BP: (!) 204/98 (!) 220/98 (!) 220/98 (!) 188/84   Pulse: 71 72 76 78   Patient Position - Orthostatic VS: Sitting Sitting Sitting Sitting         Visual Acuity      ED Medications  Medications   nitroglycerin (NITRO-BID) 2 % TD ointment 1 inch (has no administration in time range)   nitroglycerin (NITROSTAT) SL tablet 0 4 mg (has no administration in time range)   Labetalol HCl (NORMODYNE) injection 20 mg (has no administration in time range)   ipratropium-albuterol (FOR EMS ONLY) (DUO-NEB) 0 5-2 5 mg/3 mL inhalation solution 3 mL (0 mL Does not apply Given to EMS 12/1/19 0920)   methylPREDNISolone sodium succinate (FOR EMS ONLY) (Solu-MEDROL) 125 MG injection 125 mg (0 mg Does not apply Given to EMS 12/1/19 0921)   Labetalol HCl (NORMODYNE) injection 10 mg (10 mg Intravenous Given 12/1/19 1113)       Diagnostic Studies  Results Reviewed     Procedure Component Value Units Date/Time    [place] draw [[id number]] Collected:  12/01/19 0929    Lab Status:  Final result Specimen:  Blood from Arm, Right Updated:  12/01/19 1102    Narrative: The following orders were created for panel order [place] draw  Procedure                               Abnormality         Status                     ---------                               -----------         ------                     Radha Perez Top on [facility][[id number]]                           Final result                 Please view results for these tests on the individual orders      NT-BNP PRO [[id number]]  (Abnormal) Collected:  12/01/19 0926    Lab Status:  Final result Specimen:  Blood from Arm, Right Updated:  12/01/19 0955     NT-proBNP 35,372 pg/mL     Magnesium [[id number]]  (Normal) Collected:  12/01/19 0926    Lab Status:  Final result Specimen:  Blood from Arm, Right Updated:  12/01/19 0955     Magnesium 2 3 mg/dL     Phosphorus [[id number]]  (Normal) Collected:  12/01/19 0926    Lab Status:  Final result Specimen:  Blood from Arm, Right Updated:  12/01/19 0955     Phosphorus 3 8 mg/dL     Troponin I [[id number]]  (Abnormal) Collected:  12/01/19 0926    Lab Status:  Final result Specimen:  Blood from Arm, Right Updated:  12/01/19 0952     Troponin I 0 05 ng/mL     Comprehensive metabolic panel [[id number]]  (Abnormal) Collected:  12/01/19 0926    Lab Status:  Final result Specimen:  Blood from Arm, Right Updated:  12/01/19 0950     Sodium 139 mmol/L      Potassium 4 5 mmol/L      Chloride 104 mmol/L      CO2 27 mmol/L      ANION GAP 8 mmol/L      BUN 33 mg/dL      Creatinine 3 08 mg/dL      Glucose 146 mg/dL      Calcium 8 7 mg/dL      AST 19 U/L      ALT 21 U/L      Alkaline Phosphatase 143 U/L      Total Protein 7 1 g/dL      Albumin 3 3 g/dL      Total Bilirubin 0 90 mg/dL      eGFR 22 ml/min/1 73sq m     Narrative:       [facility] guidelines for Chronic Kidney Disease (CKD):     Stage 1 with normal or high GFR (GFR > 90 mL/min/1 73 square meters)    Stage 2 Mild CKD (GFR = 60-89 mL/min/1 73 square meters)    Stage 3A Moderate CKD (GFR = 45-59 mL/min/1 73 square meters)    Stage 3B Moderate CKD (GFR = 30-44 mL/min/1 73 square meters)    Stage 4 Severe CKD (GFR = 15-29 mL/min/1 73 square meters)    Stage 5 End Stage CKD (GFR <15 mL/min/1 73 square meters)  Note: GFR calculation is accurate only with a steady state creatinine    Beta Hydroxybutyrate [[id number]]  (Normal) Collected:  12/01/19 0926    Lab Status:  Final result Specimen:  Blood from Arm, Right Updated:  12/01/19 0939     BETA-HYDROXYBUTYRATE 0 3 mmol/L     Blood gas, venous [[id number]]  (Abnormal) Collected:  12/01/19 0926    Lab Status:  Final result Specimen:  Blood from Arm, Right Updated:  12/01/19 0934     pH, Mio 7 369     pCO2, Mio 42 1 mm Hg      pO2, Mio 39 0 mm Hg      HCO3, Mio 23 7 mmol/L      Base Excess, Mio -1 5 mmol/L      O2 Content, Mio 12 9 ml/dL O2 HGB, VENOUS 69 7 %     CBC and differential [[id number]]  (Abnormal) Collected:  12/01/19 0926    Lab Status:  Final result Specimen:  Blood from Arm, Right Updated:  12/01/19 0933     WBC 15 89 Thousand/uL      RBC 3 83 Million/uL      Hemoglobin 11 9 g/dL      Hematocrit 38 0 %      MCV 99 fL      MCH 31 1 pg      MCHC 31 3 g/dL      RDW 15 5 %      MPV 8 2 fL      Platelets 430 Thousands/uL      nRBC 0 /100 WBCs      Neutrophils Relative 72 %      Immat GRANS % 1 %      Lymphocytes Relative 15 %      Monocytes Relative 7 %      Eosinophils Relative 4 %      Basophils Relative 1 %      Neutrophils Absolute 11 63 Thousands/µL      Immature Grans Absolute 0 08 Thousand/uL      Lymphocytes Absolute 2 40 Thousands/µL      Monocytes Absolute 1 09 Thousand/µL      Eosinophils Absolute 0 56 Thousand/µL      Basophils Absolute 0 13 Thousands/µL                  XR chest 2 views    (Results Pending)              Procedures  ECG 12 Lead Documentation Only  Date/Time: 12/1/2019 9:20 AM  Performed by: Alex Armendariz MD  Authorized by: Alex Armendariz MD     Indications / Diagnosis:  Sob  ECG reviewed by me, the ED Provider: yes    Patient location:  ED  Previous ECG:     Comparison to cardiac monitor: Yes    Interpretation:     Interpretation: non-specific    Quality:     Tracing quality:  Limited by artifact  Rate:     ECG rate:  80    ECG rate assessment: normal    Rhythm:     Rhythm: sinus rhythm    Ectopy:     Ectopy: none    QRS:     QRS axis:  Normal    QRS intervals:  Normal  Conduction:     Conduction: normal    ST segments:     ST segments:  Normal  T waves:     T waves: normal             ED Course  ED Course as of Dec 01 1307   Sun Dec 01, 2019   1124 Patient advised of results thus far and plan for labetalol and nitroglycerin        1242 Patient with uncontrolled hypertension present associated with the sudden onset of severe shortness of breath, lung exam with bilateral crackles as well as increased respiratory effort, chest x-ray concerning for bilateral pulmonary edema  2011 GAVINO Andrade, for SLIM  Is at the bedside at present                                  MDM  Number of Diagnoses or Management Options  Hypertensive urgency:   Pulmonary edema:      Amount and/or Complexity of Data Reviewed  Tests in the radiology section of CPT®: ordered and reviewed  Tests in the medicine section of CPT®: ordered and reviewed  Decide to obtain previous medical records or to obtain history from someone other than the patient: yes  Independent visualization of images, tracings, or specimens: yes    Risk of Complications, Morbidity, and/or Mortality  Presenting problems: high  Management options: high    Patient Progress  Patient progress: improved      Disposition  Final diagnoses:   None     ED Disposition     None      Follow-up Information    None         Patient's Medications   Discharge Prescriptions    No medications on file     No discharge procedures on file      ED Provider  Electronically Signed by           Lea Cook MD  12/01/19 MD Saadia  12/01/19 4266

## 2019-12-01 NOTE — ASSESSMENT & PLAN NOTE
Lab Results   Component Value Date    HGBA1C 4 8 10/11/2019       No results for input(s): POCGLU in the last 72 hours      Blood Sugar Average: Last 72 hrs:     Accu-Cheks a c  HS  Continue prior to admission long-acting insulin  Insulin sliding scale

## 2019-12-02 VITALS
HEART RATE: 76 BPM | SYSTOLIC BLOOD PRESSURE: 141 MMHG | RESPIRATION RATE: 18 BRPM | TEMPERATURE: 98.4 F | DIASTOLIC BLOOD PRESSURE: 68 MMHG | OXYGEN SATURATION: 96 % | BODY MASS INDEX: 19.04 KG/M2 | WEIGHT: 111.55 LBS | HEIGHT: 64 IN

## 2019-12-02 LAB
ANION GAP SERPL CALCULATED.3IONS-SCNC: 11 MMOL/L (ref 4–13)
ATRIAL RATE: 80 BPM
BUN SERPL-MCNC: 60 MG/DL (ref 5–25)
CALCIUM SERPL-MCNC: 8.5 MG/DL (ref 8.3–10.1)
CHLORIDE SERPL-SCNC: 99 MMOL/L (ref 100–108)
CO2 SERPL-SCNC: 24 MMOL/L (ref 21–32)
CREAT SERPL-MCNC: 4.3 MG/DL (ref 0.6–1.3)
ERYTHROCYTE [DISTWIDTH] IN BLOOD BY AUTOMATED COUNT: 15.1 % (ref 11.6–15.1)
GFR SERPL CREATININE-BSD FRML MDRD: 15 ML/MIN/1.73SQ M
GLUCOSE SERPL-MCNC: 395 MG/DL (ref 65–140)
GLUCOSE SERPL-MCNC: 443 MG/DL (ref 65–140)
GLUCOSE SERPL-MCNC: 451 MG/DL (ref 65–140)
HCT VFR BLD AUTO: 31.3 % (ref 36.5–49.3)
HGB BLD-MCNC: 10 G/DL (ref 12–17)
MAGNESIUM SERPL-MCNC: 2.5 MG/DL (ref 1.6–2.6)
MCH RBC QN AUTO: 31.5 PG (ref 26.8–34.3)
MCHC RBC AUTO-ENTMCNC: 31.9 G/DL (ref 31.4–37.4)
MCV RBC AUTO: 99 FL (ref 82–98)
P AXIS: 57 DEGREES
PHOSPHATE SERPL-MCNC: 5.4 MG/DL (ref 2.7–4.5)
PLATELET # BLD AUTO: 256 THOUSANDS/UL (ref 149–390)
PMV BLD AUTO: 8.8 FL (ref 8.9–12.7)
POTASSIUM SERPL-SCNC: 4.7 MMOL/L (ref 3.5–5.3)
PR INTERVAL: 130 MS
QRS AXIS: 21 DEGREES
QRSD INTERVAL: 94 MS
QT INTERVAL: 416 MS
QTC INTERVAL: 479 MS
RBC # BLD AUTO: 3.17 MILLION/UL (ref 3.88–5.62)
SODIUM SERPL-SCNC: 134 MMOL/L (ref 136–145)
T WAVE AXIS: 89 DEGREES
VENTRICULAR RATE: 80 BPM
WBC # BLD AUTO: 7.27 THOUSAND/UL (ref 4.31–10.16)

## 2019-12-02 PROCEDURE — 93010 ELECTROCARDIOGRAM REPORT: CPT | Performed by: INTERNAL MEDICINE

## 2019-12-02 PROCEDURE — 83735 ASSAY OF MAGNESIUM: CPT | Performed by: NURSE PRACTITIONER

## 2019-12-02 PROCEDURE — 99244 OFF/OP CNSLTJ NEW/EST MOD 40: CPT | Performed by: INTERNAL MEDICINE

## 2019-12-02 PROCEDURE — 80048 BASIC METABOLIC PNL TOTAL CA: CPT | Performed by: NURSE PRACTITIONER

## 2019-12-02 PROCEDURE — 82948 REAGENT STRIP/BLOOD GLUCOSE: CPT

## 2019-12-02 PROCEDURE — 84100 ASSAY OF PHOSPHORUS: CPT | Performed by: NURSE PRACTITIONER

## 2019-12-02 PROCEDURE — 85027 COMPLETE CBC AUTOMATED: CPT | Performed by: NURSE PRACTITIONER

## 2019-12-02 PROCEDURE — 99217 PR OBSERVATION CARE DISCHARGE MANAGEMENT: CPT | Performed by: NURSE PRACTITIONER

## 2019-12-02 RX ADMIN — PANTOPRAZOLE SODIUM 40 MG: 40 TABLET, DELAYED RELEASE ORAL at 05:25

## 2019-12-02 RX ADMIN — AZATHIOPRINE 50 MG: 50 TABLET ORAL at 08:41

## 2019-12-02 RX ADMIN — METOPROLOL SUCCINATE 50 MG: 50 TABLET, EXTENDED RELEASE ORAL at 08:41

## 2019-12-02 RX ADMIN — TACROLIMUS 3 MG: 1 CAPSULE ORAL at 11:55

## 2019-12-02 RX ADMIN — NIFEDIPINE 90 MG: 30 TABLET, FILM COATED, EXTENDED RELEASE ORAL at 08:41

## 2019-12-02 RX ADMIN — INSULIN DETEMIR 7 UNITS: 100 INJECTION, SOLUTION SUBCUTANEOUS at 08:41

## 2019-12-02 RX ADMIN — PREDNISONE 5 MG: 5 TABLET ORAL at 08:41

## 2019-12-02 RX ADMIN — ASPIRIN 81 MG 81 MG: 81 TABLET ORAL at 08:41

## 2019-12-02 RX ADMIN — HEPARIN SODIUM 5000 UNITS: 5000 INJECTION INTRAVENOUS; SUBCUTANEOUS at 05:25

## 2019-12-02 RX ADMIN — CARVEDILOL 6.25 MG: 3.12 TABLET, FILM COATED ORAL at 07:35

## 2019-12-02 RX ADMIN — INSULIN LISPRO 5 UNITS: 100 INJECTION, SOLUTION INTRAVENOUS; SUBCUTANEOUS at 11:55

## 2019-12-02 RX ADMIN — HYDRALAZINE HYDROCHLORIDE 50 MG: 25 TABLET ORAL at 05:25

## 2019-12-02 RX ADMIN — INSULIN LISPRO 5 UNITS: 100 INJECTION, SOLUTION INTRAVENOUS; SUBCUTANEOUS at 07:36

## 2019-12-02 NOTE — ASSESSMENT & PLAN NOTE
RESOLVED    Chief complaint of shortness of breath-sudden onset-also reports history of flash pulmonary edema over the past several months-patient states was hypoxic on room air has evidence by SPO2 in the mid 60s-chart review indicates SpO2 of 93 on 100% non-rebreather in emergency room  Chest x-ray collected-volume overload highly suspicion-not officially read to date  Patient transition to 3 L nasal cannula satting 93%  Physical exam reveals bilateral crackles throughout  Please see additional treatment plan for pulmonary edema

## 2019-12-02 NOTE — ASSESSMENT & PLAN NOTE
RESOLVED    As evidence by blood pressure 224/103 in the emergency room  Patient was given labetalol  Patient reports compliance with prior to admission Coreg and Toprol  Patient will be giving 1/2 inch nitropaste  Admit to [facility] surge with telemetry- monitor per protocol  Continue prior to admission medications  Blood pressure is trending down nicely

## 2019-12-02 NOTE — NURSING NOTE
Patient wheeled off floor via wheelchair by wife  Discharge instructions reviewed, verbal understanding of same  Patient in no acute distress

## 2019-12-02 NOTE — DISCHARGE SUMMARY
Discharge- Andrei Pearl 1970, 47 y o  male MRN: [id number]    Unit/Bed#: 923-05 Encounter: [id number]    Primary Care Provider: Nila Jean DO   Date and time admitted to hospital: 12/1/2019  9:04 AM        * Acute respiratory failure with hypoxia [facility])  Assessment & Plan  RESOLVED    Chief complaint of shortness of breath-sudden onset-also reports history of flash pulmonary edema over the past several months-patient states was hypoxic on room air has evidence by SPO2 in the mid 60s-chart review indicates SpO2 of 93 on 100% non-rebreather in emergency room  Chest x-ray collected-volume overload highly suspicion-not officially read to date  Patient transition to 3 L nasal cannula satting 93%  Physical exam reveals bilateral crackles throughout  Please see additional treatment plan for pulmonary edema    Hypertensive urgency  Assessment & Plan  RESOLVED    As evidence by blood pressure 224/103 in the emergency room  Patient was given labetalol  Patient reports compliance with prior to admission Coreg and Toprol  Patient will be giving 1/2 inch nitropaste  Admit to [facility] with telemetry- monitor per protocol  Continue prior to admission medications  Blood pressure is trending down nicely    Acute pulmonary edema (HCC)  Assessment & Plan  Unclear etiology, appears RESOLVED    Pulmonary edema most likely flash pulmonary edema accompanied with hypertensive urgency 224/103  Chest x-ray highly suspicious for vascular congestion not officially read as of now  Lasix 40 mg IV was given  1/2 inch nitropaste was given   Supplement O2 to SpO2 greater than 92%  Respiratory support  Respiratory protocol in place    Acute renal failure (ARF) ([facility] )  Assessment & Plan  Acute renal failure status post renal transplant required hemodialysis  Hemodialysis catheter right chest  Received hemodialysis yesterday states hemodialysis is on Tuesday  Nephrology will be consulted resume dialysis Tuesday Thursday Saturday    Renal transplant, status post  Assessment & Plan  Status post renal transplant  Continue prior to admission Prograf and Imuran  Nephrology consulted    Type 1 diabetes mellitus [facility])  Assessment & Plan  Lab Results   Component Value Date    HGBA1C 4 8 10/11/2019       Recent Labs     12/01/19  1603 12/01/19  2113 12/02/19  0731 12/02/19  1154   POCGLU 351* 472* 451* 395*       Blood Sugar Average: Last 72 hrs:  (P) 417 25       Resume prior to admission medications          Consultations During Hospital Stay:  · Nephrology    Procedures Performed:   · n/a    Significant Findings / Test Results:   · n/a    Incidental Findings:   · n/a     Test Results Pending at Discharge (will require follow up):   · n/a     Outpatient Tests Requested:  · n/a    Complications:  none    Reason for Admission:  Pulmonary edema and acute respiratory failure    Hospital Course:     Amrik Adam is a 47 y o  male patient who originally presented to the hospital on 12/1/2019 due to acute respiratory failure requiring O2 supplementation and most likely secondary to pulmonary edema of unclear etiology which is now subsequently resolved in additionally was admitted for hypertensive urgency  Patient was given happens nitro paste and 40 mg IV push Lasix  Patient has a past medical history includes renal transplant requiring hemodialysis Tuesday Thursday and Saturday  Nephrology was consulted no further intervention at this time patient is to resume dialysis tomorrow on Tuesday December 3rd  Please see above list of diagnoses and related plan for additional information       Condition at Discharge: fair     Discharge Day Visit / Exam:     Subjective:  Denies any current complaints including chest pain and shortness of breath, states his breathing is much better-also reports not using O2 supplementation,   Vitals: Blood Pressure: 141/68 (12/02/19 0829)  Pulse: 76 (12/02/19 0829)  Temperature: 98 4 °F (36 9 °C) (12/02/19 4875)  Temp Source: Temporal (12/02/19 9619)  Respirations: 18 (12/02/19 7117)  Height: 5' 4" (162 6 cm) (12/01/19 1400)  Weight - Scale: 50 6 kg (111 lb 8 8 oz) (12/02/19 0600)  SpO2: 92 % (12/02/19 0829)  Exam:   Physical Exam  Physical Exam   Constitutional: He is oriented to person, place, and time  He appears well-developed and well-nourished  HENT:   Head: Normocephalic and atraumatic  Eyes: Pupils are equal, round, and reactive to light  EOM are normal    Neck: Normal range of motion  Neck supple  Cardiovascular: Normal rate, regular rhythm, normal heart sounds and intact distal pulses  Pulmonary/Chest: Effort normal and breath sounds normal    Abdominal: Soft  Bowel sounds are normal    Musculoskeletal: He exhibits no edema, tenderness or deformity  Orbital edema has decreased   Neurological: He is alert and oriented to person, place, and time  Skin: Skin is warm and dry  Capillary refill takes less than 2 seconds  Psychiatric: He has a normal mood and affect  His behavior is normal  Judgment and thought content normal        Discussion with Family:  Discussion with patient to resume dialysis as previously scheduled    Discharge instructions/Information to patient and family:   See after visit summary for information provided to patient and family  Provisions for Follow-Up Care:  See after visit summary for information related to follow-up care and any pertinent home health orders  Disposition:     Home    For Discharges to [facility] SNF:   · Not Applicable to this Patient - Not Applicable to this Patient    Planned Readmission: TBD     Discharge Statement:  I spent 35 minutes discharging the patient  This time was spent on the day of discharge  I had direct contact with the patient on the day of discharge   Greater than 50% of the total time was spent examining patient, answering all patient questions, arranging and discussing plan of care with patient as well as directly providing post-discharge instructions  Additional time then spent on discharge activities  Discharge Medications:  See after visit summary for reconciled discharge medications provided to patient and family        ** Please Note: This note has been constructed using a voice recognition system **

## 2019-12-02 NOTE — ASSESSMENT & PLAN NOTE
Unclear etiology, appears RESOLVED    Pulmonary edema most likely flash pulmonary edema accompanied with hypertensive urgency 224/103  Chest x-ray highly suspicious for vascular congestion not officially read as of now  Lasix 40 mg IV was given  1/2 inch nitropaste was given   Supplement O2 to SpO2 greater than 92%  Respiratory support  Respiratory protocol in place

## 2019-12-02 NOTE — SOCIAL WORK
Pt is being discharged today  Pt's wife will give the patient a ride home   Follow up appointments reviewed with a good understanding   Case Management reviewed discharge planning process including the following: identifying help that is needed at home, pt's preference for discharge needs and Meds at [facility]  Reviewed with Pt that any member of the healthcare team can answer questions regarding : medications, jmportance of recognizing  Signs and symptoms of any  medical problems  Case Management also encouraged pt to follow up with all recommended appointments after discharge

## 2019-12-02 NOTE — ASSESSMENT & PLAN NOTE
Lab Results   Component Value Date    HGBA1C 4 8 10/11/2019       Recent Labs     12/01/19  1603 12/01/19  2113 12/02/19  0731 12/02/19  1154   POCGLU 351* 472* 451* 395*       Blood Sugar Average: Last 72 hrs:  (P) 417 25       Resume prior to admission medications

## 2019-12-02 NOTE — ASSESSMENT & PLAN NOTE
Acute renal failure status post renal transplant required hemodialysis  Hemodialysis catheter right chest  Received hemodialysis yesterday states hemodialysis is on Tuesday  Nephrology will be consulted resume dialysis Tuesday Thursday Saturday

## 2019-12-02 NOTE — PLAN OF CARE
Problem: DISCHARGE PLANNING - CARE MANAGEMENT  Goal: Discharge to post-acute care or home with appropriate resources  Description  INTERVENTIONS:  - Conduct assessment to determine patient/family and health care team treatment goals, and need for post-acute services based on payer coverage, community resources, and patient preferences, and barriers to discharge  - Address psychosocial, clinical, and financial barriers to discharge as identified in assessment in conjunction with the patient/family and health care team  - Arrange appropriate level of post-acute services according to patient's   needs and preference and payer coverage in collaboration with the physician and health care team  - Communicate with and update the patient/family, physician, and health care team regarding progress on the discharge plan  - Arrange appropriate transportation to post-acute venues    Pt's goal is to return home with spouse   Outcome: Completed

## 2019-12-02 NOTE — SOCIAL WORK
Chart reviewed by case management,assessment completed at the bedside, pt lives with his wife in a 1 story home,, no steps outside or inside, pt works as a , pt's wife drives ,shop, cleans, cooks and helps with his care, pt denies any d/c needs, pt declined hhc, pt has a walker, cane, commode, shower chair and glucometer at home, pt goes to [address] [place] [address] Edinson Soto 09:15 am -thur 09:15 am -Sat 11:15 am Cory Durant 6674 was called and made aware of the  d/c for today, I was able to reach his wife at 13:45 to make her aware of the d/c , she mark be transporting the pt home, I made her aware the pt declined [facility], pcp appointment was made, pt is in agreement with the d/c and d/c plan home with spouse, d/c plan was discussed at care coordination rounds today,  Patient/caregiver received discharge checklist   Content reviewed  Patient/caregiver encouraged to participate in discharge plan of care prior to discharge home  CM reviewed d/c planning process including the following: identifying help at home, patient preference for d/c planning needs, availability of treatment team to discuss questions or concerns patient and/or family may have regarding understanding medications and recognizing signs and symptoms once discharged  CM also encouraged patient to follow up with all recommended appointments after discharge  Patient advised of importance for patient and family to participate in managing patients medical well being

## 2019-12-02 NOTE — CONSULTS
Consultation - Nephrology   Ed Yates 47 y o  male MRN: [id number]  Unit/Bed#: 495-98 Encounter: [id number]      A/P:  1  End-stage renal disease   Continue hemodialysis sessions Tuesday Thursday Saturday, next session to be tomorrow from December 3rd  2  Secondary hyperparathyroidism   Continue phosphorus binders  3  Diabetic nephropathy   Blood sugars have been doing well, continue with current medications  4  Anemia of chronic kidney disease   Continue with CAPRICE's according outpatient Epogen regimen, hemoglobin is 10 g/dL  5  Hypertensive urgency -resolved  6  Living related renal transplant   Continue with Azathioprine and tacrolimus   7  Probable flash pulmonary edema   Unclear etiology, appears resolved  Unclear how the patient's symptoms were significantly improved with nebulizer treatment  Unclear if there is a bronchospasm component  At this point patient's recover from this episode  Thank you for allowing us to participate in the care of your patient  Please feel free to contact us regarding the care of this patient, or any other questions/concerns that may be applicable      Patient Active Problem List   Diagnosis    [facility]omyel[facility]    Foot pain    Type 1 diabetes mellitus ([facility] )    Renal transplant, status post    Sepsis ([address]    Acute kidney injury ([address] )    Osteomyelitis ([address]    Poor circulation    Closed fracture of distal end of right tibia with routine healing    Chronic kidney disease, stage IV (severe) ([address] )    Chronic osteomyelitis of tibia ([address] )    Immunosuppression ([address] )    Hypertension    [facility]acidoses[facility]    Elevated troponin    Diarrhea    Cellulitis of ankle    Non-ST elevation myocardial infarction (NSTEMI), type 2    Urinary retention    Severe sepsis ([facility])    Acute renal failure (ARF) ([facility])    Acute kidney injury superimposed on CKD ([facility])    Metabolic acidosis    Hyperphosphatemia    Gastrointestinal hemorrhage    Bacteremia    S/P AKA (above knee amputation), right (HCC)    Acute blood loss anemia    Acute respiratory failure with hypoxia (HCC)    Pulmonary hypertension (HCC)    Chronic anemia    Depressed left ventricular ejection fraction    Acute pulmonary edema (HCC)    Hx of right BKA (HCC)    Hypertensive urgency       History of Present Illness   Physician Requesting Consult: Starla Martinez DO  Reason for Consult / Principal Problem:  End-stage renal disease  Hx and PE limited by:   HPI: Kenya Pan is a 47y o  year old male who presented to the emergency department early on Jaime morning, December 1st, due to hypoxia  Patient is a hemodialysis patient who has regular treatment on Tuesday Thursday Saturday, he went to this treatment on Saturday and completed without any issues  Claims when home has typical intake of food as well as liquids which was appropriate  Only small potential issue with his foods that he ate Thanksgiving meal as leftovers which could have been a little saltier than typical foods, however nothing out of the ordinary  Rarely since morning, the patient was suffering with significant hypoxia and difficulty breathing  He started giving himself a breathing treatment by the time EMS arrived and documented a pulse ox in around 60%  The timing finished with his breathing treatment, pulse ox was up into the 80s and after the patient had a non-rebreather mask placed by the EMS crew his oxygenation was up into the low 90s  Patient's blood pressure is elevated, otherwise no other issues rate would be noted  During the workup phase emergency department nothing acute was able to be determined  With review of medications and other potential intake of food or supplements, nothing was able to be noted in the history as it may apply to this episode of hypoxia  At this time, there is clinical concern for potential flash pulmonary edema    Patient claims he has had this in the past with no etiology that up to this point has been able to be determined  Patient feels like himself this morning, nasal cannula is not currently in the nostrils and the patient was saturating at 98-99% on room air  History obtained from chart review and the patient    Review of Systems - 14 point review system was all completely negative with exception was mentioned above, at this time the patient denies any those complaints as well  Historical Information   Past Medical History:   Diagnosis Date    Bacteremia 12/21/2018    Card[facility] arrest [facility] )     Diabetes mellitus ([facility] )     Diabetes mellitus type 1 ([facility] )     Dialysis patient [facility])     Access in right chest    GI bleed     Hypertension     Infection at site of external fixator pin ([facility] )     MI (myocardial infarction) ([place] [address] )     Pneumonia     Last Assessed [id number]    Renal failure     Renal transplant, status post 07/21/2007     Past Surgical History:   Procedure Laterality Date    ANKLE FRACTURE SURGERY      ANKLE HARDWARE REMOVAL Right 7/31/2017    Procedure: REMOVAL HARDWARE ANKLE;  Surgeon: Talia Kevin MD;  [facility]: [facility];  Service: Orthopedics    ANKLE HARDWARE REMOVAL Right 8/17/2017    Procedure: TIBIA FAILED HARDWARE REMOVAL;  Surgeon: Talia Kevin MD;  [facility];  Service: Orthopedics    CARDIAC SURGERY      2 stents    CLOSED REDUCTION ANKLE Right 7/3/2017    Procedure: CLOSED REDUCTION DISTAL TIB-FIB AND CASTING VS;  Surgeon: Talia Kevin MD;  [facility]: [facility];  Service: Orthopedics    ESOPHAGOGASTRODUODENOSCOPY N/A 12/20/2018    Procedure: ESOPHAGOGASTRODUODENOSCOPY (EGD) in ICU; Surgeon: Renate Gonzalez MD;  Location: [facility];   Service: Gastroenterology    EYE SURGERY      FRACTURE SURGERY      ORIF Rt Ankle    GLUTAMIC ACID DECARBOXYLASE (HISTORICAL)      IR [address] PLACEMENT  10/21/2019    IR PICC LINE  8/20/2018    IR VENOUS LINE REMOVAL  10/5/2018    LEG AMPUTATION Right 02/01/2019    Above the knee    NEPHRECTOMY TRANSPLANTED ORGAN      MS OPEN TREATMENT FRACTURE DISTAL TIBIA FIBULA Right 7/3/2017    Procedure: OPEN REDUCTION W/ INTERNAL FIXATION (ORIF);   Surgeon: Migel Swanson MD;  [facility]: [facility];  Service: Orthopedics    TOE AMPUTATION Right 10/27/2016    Procedure: AMPUTATION TOE;  Surgeon: Anai Murphy DPM;  [facility];  Service:      Social History   Social History     Substance and Sexual Activity   Alcohol Use Not Currently     Social History     Substance and Sexual Activity   Drug Use Never     Social History     Tobacco Use   Smoking Status Never Smoker   Smokeless Tobacco Never Used     Family History   Problem Relation Age of Onset    Diabetes Brother     Coronary artery disease Mother        Meds/Allergies   all current active meds have been reviewed, current meds:   Current Facility-Administered Medications   Medication Dose Route Frequency    albuterol inhalation solution 2 5 mg  2 5 mg Nebulization Q4H PRN    aspirin chewable tablet 81 mg  81 mg Oral Daily    atorvastatin (LIPITOR) tablet 80 mg  80 mg Oral Daily    azaTHIOprine (IMURAN) tablet 50 mg  50 mg Oral Daily    carvedilol (COREG) tablet 6 25 mg  6 25 mg Oral BID With Meals    heparin (porcine) subcutaneous injection 5,000 Units  5,000 Units Subcutaneous Q8H [facility]    hydrALAZINE (APRESOLINE) tablet 50 mg  50 mg Oral Q8H [facility]    insulin detemir (LEVEMIR) subcutaneous injection 7 Units  7 Units Subcutaneous BID    insulin lispro (HumaLOG) 100 units/mL subcutaneous injection 1-5 Units  1-5 Units Subcutaneous TID AC    insulin lispro (HumaLOG) 100 units/mL subcutaneous injection 1-5 Units  1-5 Units Subcutaneous HS    metoprolol succinate (TOPROL-XL) 24 hr tablet 50 mg  50 mg Oral Daily    NIFEdipine (PROCARDIA XL) 24 hr tablet 90 mg  90 mg Oral Daily    nitroglycerin (NITROSTAT) SL tablet 0 4 mg  0 4 mg Sublingual Once PRN    pantoprazole (PROTONIX) EC tablet 40 mg  40 mg Oral Early Morning    predniSONE tablet 5 mg  5 mg Oral Daily    tacrolimus (PROGRAF) capsule 2 mg  2 mg Oral HS    tacrolimus (PROGRAF) capsule 3 mg  3 mg Oral QAM    and PTA meds:    Medications Prior to Admission   Medication    albuterol (2 5 mg/3 mL) 0 083 % nebulizer solution    aspirin 81 mg chewable tablet    atorvastatin (LIPITOR) 80 mg tablet    azaTHIOprine (IMURAN) 50 mg tablet    carvedilol (COREG) 6 25 mg tablet    Cholecalciferol 25 MCG (1000 UT) tablet    glucagon (GLUCAGON EMERGENCY) 1 MG injection    hydrALAZINE (APRESOLINE) 50 mg tablet    insulin detemir (LEVEMIR) 100 units/mL subcutaneous injection    insulin lispro (HumaLOG) 100 units/mL injection    metoprolol succinate (TOPROL-XL) 25 mg 24 hr tablet    NIFEdipine (PROCARDIA XL) 90 mg 24 hr tablet    pantoprazole (PROTONIX) 40 mg tablet    predniSONE 5 mg tablet    tacrolimus (PROGRAF) 1 mg capsule    tacrolimus (PROGRAF) 1 mg capsule    ONE TOUCH ULTRA TEST test strip         No Known Allergies    Objective     Intake/Output Summary (Last 24 hours) at 12/2/2019 1121  Last data filed at 12/2/2019 0815  Gross per 24 hour   Intake 720 ml   Output 150 ml   Net 570 ml       Invasive Devices:        Physical Exam      I/O last 3 completed shifts: In: 360 [P O :360]  Out: 150 [Urine:150]    Vitals:    12/02/19 0829   BP: 141/68   Pulse: 76   Resp:    Temp:    SpO2: 92%       Gen: in NAD, Alert/Awake  HEENT: no sclerous icterus, MMM, neck supple  CV: +S1/S2, RRR  Lungs: CTA bilaterally  Abd: +BS, soft NT/ND  Ext: all four extremities are warm  Skin: no rashes noted  Neuro: CN II-XII intact    Current Weight: Weight - Scale: 50 6 kg (111 lb 8 8 oz)  First Weight: Weight - Scale: 52 9 kg (116 lb 10 oz)    Lab Results:  I have personally reviewed pertinent labs      CBC:   Lab Results   Component Value Date    WBC 7 27 12/02/2019    HGB 10 0 (L) 12/02/2019    HCT 31 3 (L) 12/02/2019    MCV 99 (H) 12/02/2019     12/02/2019    MCH 31 5 12/02/2019 MCHC 31 9 12/02/2019    RDW 15 1 12/02/2019    MPV 8 8 (L) 12/02/2019     CMP:   Lab Results   Component Value Date    K 4 7 12/02/2019    CL 99 (L) 12/02/2019    CO2 24 12/02/2019    BUN 60 (H) 12/02/2019    CREATININE 4 30 (H) 12/02/2019    CALCIUM 8 5 12/02/2019    EGFR 15 12/02/2019     Phosphorus:   Lab Results   Component Value Date    PHOS 5 4 (H) 12/02/2019     Magnesium:   Lab Results   Component Value Date    MG 2 5 12/02/2019     Urinalysis: No results found for: Debbie Passe, SPECGRAV, PHUR, LEUKOCYTESUR, NITRITE, PROTEINUA, GLUCOSEU, KETONESU, BILIRUBINUR, BLOODU  Ionized Calcium: No results found for: CAION  Coagulation: No results found for: PT, INR, APTT  Troponin: No results found for: TROPONINI  ABG: No results found for: PHART, LGZ9FVN, PO2ART, XIP3END, S7LYQYNX, BEART, SOURCE    Results from last 7 days   Lab Units 12/02/19  0455 12/01/19  0926   POTASSIUM mmol/L 4 7 4 5   CHLORIDE mmol/L 99* 104   CO2 mmol/L 24 27   BUN mg/dL 60* 33*   CREATININE mg/dL 4 30* 3 08*   CALCIUM mg/dL 8 5 8 7   ALK PHOS U/L  --  143*   ALT U/L  --  21   AST U/L  --  19       Radiology review:  Procedure: Xr Chest 2 Views    Result Date: 12/1/2019  Narrative: CHEST INDICATION:   sob  COMPARISON:  10/19/2019 EXAM PERFORMED/VIEWS:  XR CHEST PA & LATERAL FINDINGS:  There is a right-sided dialysis catheter with its tip projecting over the right atrium  Cardiomediastinal silhouette appears unremarkable  There is patchy airspace disease bilaterally, grossly stable  Osseous structures appear within normal limits for patient age  Impression: Right-sided dialysis catheter with its tip projecting over the right atrium  No definite pneumothorax identified  Patchy airspace disease bilaterally is favored to be on the basis of pulmonary edema   Workstation performed: [id number]         Lindsay Juárez DO      This consultation note was produced in part using a dictation device which may document imprecise wording from author's original intent

## 2019-12-02 NOTE — UTILIZATION REVIEW
Initial Clinical Review    Admission: Date/Time/Statement: OBS  12/1 1330  Orders Placed This Encounter   Procedures    Place in Observation     Standing Status:   Standing     Number of Occurrences:   1     Order Specific Question:   Admitting Physician     Answer:   Torri Beltrán     Order Specific Question:   Level of Care     Answer:   Med Surg [16]     Comments:   tele     ED Arrival Information     Expected Arrival Acuity Means of Arrival Escorted By Service Admission Type    - 12/1/2019 09:04 [facility] [place] [facility]    Arrival Complaint    sob        Chief Complaint   Patient presents with    Shortness of Breath     Started with SOB this AM       Assessment/Plan: 46 yo male to ED from home via EMS w/ SOB EMS found pt to have O2 sat 63-64 % and placed on 100 % NRB , in ED found to have elevated /103 and CXR concerning for pulmonary edema  NTG paste applied and IV lasix given   Admitted OBS status w/ acute resp failure , HTN and pulm edema  On  Tele , monitor BP , cont BP meds , O2 keep sat > 92% , nephrology consult        PE : rales, edema and R AKA     ED Triage Vitals   Temperature Pulse Respirations Blood Pressure SpO2   12/01/19 0910 12/01/19 0910 12/01/19 0910 12/01/19 0910 12/01/19 0910   97 6 °F (36 4 °C) 82 (!) 24 (!) 224/103 93 %      Temp Source Heart Rate Source Patient Position - Orthostatic VS BP Location FiO2 (%)   12/01/19 1400 12/01/19 0910 12/01/19 0910 12/01/19 0910 --   Oral Monitor Sitting Right arm       Pain Score       12/01/19 0945       No Pain        Wt Readings from Last 1 Encounters:   12/02/19 50 6 kg (111 lb 8 8 oz)     Additional Vital Signs:   12/02/19 08:29:20    76    141/68  92  92 %       12/02/19 06:23:23  98 4 °F (36 9 °C)  76  18  143/69  94  97 %  None (Room air)  Sitting   12/02/19 0522        146/70        Sitting   12/01/19 23:02:31  98 2 °F (36 8 °C)  76        95 %       12/01/19 22:44:01   79  16  142/68  93  96 %       12/01/19 2114        142/68        Lying   12/01/19 19:14:24    76    144/71  95  95 %       12/01/19 16:05:51    83    146/70  95  95 %       12/01/19 1526    78  16      96 %        SpO2: 1 5 at 12/01/19 1526   12/01/19 14:53:10    80    149/72  98  95 %       12/01/19 14:00:35  98 6 °F (37 °C)  81  18  148/73  98  93 %  Nasal cannula  Lying   12/01/19 1330    80  30Abnormal   160/74    93 %       12/01/19 1300    79  31Abnormal   151/71    92 %       12/01/19 1122    78    188/84Abnormal         Sitting   12/01/19 1119    76  30Abnormal   220/98Abnormal         Sitting   12/01/19 1100    72  25Abnormal   220/98Abnormal     100 %    Sitting   12/01/19 1030    71  22  204/98Abnormal     99 %  Non-rebreather mask  Sitting   12/01/19 1000    74  21  199/98Abnormal     97 %  Non-rebreather mask  Sitting   12/01/19 0949    75  22  203/96Abnormal     99 %  Non-rebreather mask  Sitting   12/01/19 0945      22  205/102Abnormal     100 %  Non-rebreather mask  Sitting   12/01/19 0935              Non-rebreather mask       Pertinent Labs/Diagnostic Test Results:   12/1 CXR - Right-sided dialysis catheter with its tip projecting over the right atrium  No definite pneumothorax identified  Patchy airspace disease bilaterally is favored to be on the basis of pulmonary edema    12/1 EKG - NSR   Results from last 7 days   Lab Units 12/02/19  0504 12/01/19  0926   WBC Thousand/uL 7 27 15 89*   HEMOGLOBIN g/dL 10 0* 11 9*   HEMATOCRIT % 31 3* 38 0   PLATELETS Thousands/uL 256 308   NEUTROS ABS Thousands/µL  --  11 63*     Results from last 7 days   Lab Units 12/02/19  0455 12/01/19  0926   SODIUM mmol/L 134* 139   POTASSIUM mmol/L 4 7 4 5   CHLORIDE mmol/L 99* 104   CO2 mmol/L 24 27   ANION GAP mmol/L 11 8   BUN mg/dL 60* 33*   CREATININE mg/dL 4 30* 3 08*   EGFR ml/min/1 73sq m 15 22   CALCIUM mg/dL 8 5 8 7   MAGNESIUM mg/dL 2 5 2 3 PHOSPHORUS mg/dL 5 4* 3 8     Results from last 7 days   Lab Units 12/01/19  0926   AST U/L 19   ALT U/L 21   ALK PHOS U/L 143*   TOTAL PROTEIN g/dL 7 1   ALBUMIN g/dL 3 3*   TOTAL BILIRUBIN mg/dL 0 90     Results from last 7 days   Lab Units 12/02/19  0731 12/01/19  2113 12/01/19  1603   POC GLUCOSE mg/dl 451* 472* 351*     Results from last 7 days   Lab Units 12/02/19  0455 12/01/19  0926   GLUCOSE RANDOM mg/dL 443* 146*     BETA-HYDROXYBUTYRATE   Date Value Ref Range Status   12/01/2019 0 3 <0 6 mmol/L Final     Results from last 7 days   Lab Units 12/01/19  0926   PH JAMES  7 369   PCO2 JAMES mm Hg 42 1   PO2 JAMES mm Hg 39 0   HCO3 JAMES mmol/L 23 7*   BASE EXC JAMES mmol/L -1 5   O2 CONTENT JAMES ml/dL 12 9   O2 HGB, VENOUS % 69 7       Results from last 7 days   Lab Units 12/01/19  0926   TROPONIN I ng/mL 0 05*     Results from last 7 days   Lab Units 12/01/19  0926   NT-PRO BNP pg/mL 35,372*     ED Treatment:   Medication Administration from 12/01/2019 0904 to 12/01/2019 1356       Date/Time Order Dose Route Action     12/01/2019 0920 ipratropium-albuterol (FOR EMS ONLY) (DUO-NEB) 0 5-2 5 mg/3 mL inhalation solution 3 mL 0 mL Does not apply Given to EMS     12/01/2019 0921 methylPREDNISolone sodium succinate (FOR EMS ONLY) (Solu-MEDROL) 125 MG injection 125 mg 0 mg Does not apply Given to EMS     12/01/2019 1113 Labetalol HCl (NORMODYNE) injection 10 mg 10 mg Intravenous Given     12/01/2019 1343 nitroglycerin (NITRO-BID) 2 % TD ointment 0 5 inch 0 5 inch Topical Given        Past Medical History:   Diagnosis Date    Bacteremia 12/21/2018    [facility] ([facility]     Diabetes mellitus ([facility] )     Diabetes mellitus type 1 ([facility] )     Dialysis patient ([facility] )     Access in right chest    GI bleed     Hypertension     Infection at site of external fixator pin ([facility] )     [facility]my[facility] infarction) [facility]     Pneumonia     Last Assessed [id number]    Renal failure     Renal transplant, status post 07/21/2007     Present on Admission:   Type 1 diabetes mellitus (HCC)   Acute respiratory failure with hypoxia (HCC)   Acute pulmonary edema (HCC)   Acute renal failure (ARF) (HCC)   Hypertensive urgency      Admitting Diagnosis: Acute pulmonary edema (HCC) [J81 0]  Pulmonary edema [J81 1]  SOB (shortness of breath) [R06 02]  Hypertensive urgency [I16 0]  Renal transplant, status post [Z94 0]  Age/Sex: 47 y o  male  Admission Orders:  Scheduled Medications:    Medications:  aspirin 81 mg Oral Daily   atorvastatin 80 mg Oral Daily   azaTHIOprine 50 mg Oral Daily   carvedilol 6 25 mg Oral BID With Meals   heparin (porcine) 5,000 Units Subcutaneous Q8H [address]   hydrALAZINE 50 mg Oral Q8H [address]   insulin detemir 7 Units Subcutaneous BID   insulin lispro 1-5 Units Subcutaneous TID AC   insulin lispro 1-5 Units Subcutaneous HS   metoprolol succinate 50 mg Oral Daily   NIFEdipine 90 mg Oral Daily   pantoprazole 40 mg Oral Early Morning   predniSONE 5 mg Oral Daily   tacrolimus 2 mg Oral HS   tacrolimus 3 mg Oral QAM     Continuous IV Infusions:     PRN Meds:    albuterol 2 5 mg Nebulization Q4H PRN   nitroglycerin 0 4 mg Sublingual Once PRN     Fingerstick ac and hs   Tele   Cons carb diet   I&O   Daily weight   Up w/ assist  IP CONSULT TO NEPHROLOGY  IP CONSULT TO CASE MANAGEMENT    Network Utilization Review Department  [email or web address]  ATTENTION: Please call with any questions or concerns to [phone] and carefully listen to the prompts so that you are directed to the right person  All voicemails are confidential   [employer] all requests for admission clinical reviews, approved or denied determinations and any other requests to dedicated fax number below belonging to the campus where the patient is receiving treatment    FACILITY NAME UR FAX NUMBER   ADMISSION DENIALS (Administrative/Medical Necessity) [phone]   PARENT CHILD HEALTH (Maternity/NICU/Pediatrics) [place] [address] [facility] [phone]   [address] [facility] [phone]   [address] [facility]  [place] [address] [facility] [phone]   Shiva Panda [address] [phone]

## 2019-12-03 ENCOUNTER — TELEPHONE (OUTPATIENT)
Dept: VASCULAR SURGERY | Facility: CLINIC | Age: 49
End: 2019-12-03

## 2019-12-03 ENCOUNTER — TRANSITIONAL CARE MANAGEMENT (OUTPATIENT)
Dept: FAMILY MEDICINE CLINIC | Facility: CLINIC | Age: 49
End: 2019-12-03

## 2019-12-03 ENCOUNTER — CONSULT (OUTPATIENT)
Dept: VASCULAR SURGERY | Facility: CLINIC | Age: 49
End: 2019-12-03
Payer: COMMERCIAL

## 2019-12-03 VITALS
BODY MASS INDEX: 19.04 KG/M2 | DIASTOLIC BLOOD PRESSURE: 70 MMHG | HEART RATE: 84 BPM | TEMPERATURE: 99.9 F | SYSTOLIC BLOOD PRESSURE: 134 MMHG | HEIGHT: 64 IN | WEIGHT: 111.5 LBS

## 2019-12-03 DIAGNOSIS — N18.6 END-STAGE RENAL DISEASE NEEDING DIALYSIS (HCC): Primary | ICD-10-CM

## 2019-12-03 DIAGNOSIS — N18.9 CHRONIC KIDNEY DISEASE, UNSPECIFIED: ICD-10-CM

## 2019-12-03 DIAGNOSIS — Z99.2 END-STAGE RENAL DISEASE NEEDING DIALYSIS (HCC): Primary | ICD-10-CM

## 2019-12-03 PROCEDURE — 99213 OFFICE O/P EST LOW 20 MIN: CPT | Performed by: SURGERY

## 2019-12-03 RX ORDER — CHLORHEXIDINE GLUCONATE 0.12 MG/ML
15 RINSE ORAL ONCE
Status: CANCELLED | OUTPATIENT
Start: 2019-12-03 | End: 2019-12-03

## 2019-12-03 RX ORDER — LANCETS 30 GAUGE
EACH MISCELLANEOUS
Refills: 5 | COMMUNITY
Start: 2019-11-19 | End: 2021-04-19

## 2019-12-03 RX ORDER — CEFAZOLIN SODIUM 1 G/50ML
1000 SOLUTION INTRAVENOUS ONCE
Status: CANCELLED | OUTPATIENT
Start: 2020-02-11 | End: 2019-12-03

## 2019-12-03 NOTE — LETTER
December 3, 2019     Vasquez Banerjee DO  [address] [facility] [address]    Patient: Neville Carbajal   YOB: 1970   Date of Visit: 12/3/2019       Dear Dr Lita Arellano: Thank you for referring Sherita Chaudhari to me for evaluation  Below are my notes for this consultation  If you have questions, please do not hesitate to call me  I look forward to following your patient along with you           Sincerely,        Stanley Le MD        CC: Lois Sr,

## 2019-12-03 NOTE — PATIENT INSTRUCTIONS
Presents in need of permanent dialysis access vein mapping is more optimal in the right arm therefore planning a right upper arm AV fistula his pulses are nonpalpable at the wrist     Plan:  Right upper arm AV fistula after clearance and risk assessment by Cardiology

## 2019-12-03 NOTE — UTILIZATION REVIEW
Notification of Discharge  This is a Notification of Discharge from our facility [address]  Please be advised that this patient has been discharge from our facility  Below you will find the admission and discharge date and time including the patients disposition  PRESENTATION DATE: 12/1/2019  9:04 AM  OBS ADMISSION DATE: 12/1/2019  IP ADMISSION DATE: 12/1/19 1327   DISCHARGE DATE: 12/2/2019  2:00 PM  DISPOSITION: Home/Self Care Home/Self Care   Admission Orders listed below:  Admission Orders (From admission, onward)     Ordered        12/01/19 1327  Inpatient Admission  Once,   Status:  Canceled         12/01/19 1330  Place in Observation  Once                   Please contact the UR Department if additional information is required to close this patient's authorization/case  [facility] Utilization Review Department  Main: [phone] x carefully listen to the prompts  All voicemails are confidential   [email or web address]  Send all requests for admission clinical reviews, approved or denied determinations and any other requests to dedicated fax number below belonging to the campus where the patient is receiving treatment   List of dedicated fax numbers:  [address] DENIALS (Administrative/Medical Necessity) [phone]   [address]  (Maternity/NICU/Pediatrics) [phone]   [facility] [phone]   Tñoo Grooms [phone]   Jennifer [place] [phone]   [facility] [phone]   Roman [place] [phone]   Yvonna Reach [phone]   [facility] [address] 1000 [facility] [phone]

## 2019-12-03 NOTE — PROGRESS NOTES
Assessment/Plan:    End-stage renal disease needing dialysis ([facility] )  Presents in need of permanent dialysis access vein mapping is more optimal in the right arm therefore planning a right upper arm AV fistula his pulses are nonpalpable at the wrist     Plan:  Right upper arm AV fistula after clearance and risk assessment by Cardiology  Diagnoses and all orders for this visit:    End-stage renal disease needing dialysis ([facility] )    Chronic kidney disease, unspecified  -     Ambulatory referral to Vascular Surgery  -     Case request operating room: CREATION FISTULA ARTERIOVENOUS (AV) right upper extremity; Standing  -     Ambulatory referral to Cardiology; Future  -     Case request operating room: CREATION FISTULA ARTERIOVENOUS (AV) right upper extremity  -     Basic metabolic panel; Future  -     CBC and Platelet; Future    Other orders  -     Lancets ([address]) [address] [facility]  -     Diet NPO; Sips with meds; Standing  -     Void on call to OR; Standing  -     Insert peripheral IV; Standing  -     Nursing Communication Use 2 CHG cloths, have the patient wash his/her surgical site or have staff wash the site if patient is unable to; Standing  -     Nursing Communication Swab both nares with Povidone-Iodine solution, EXCLUDE if patient has shellfish/Iodine allergy; Standing  -     chlorhexidine (PERIDEX) 0 12 % oral rinse 15 mL  -     Place sequential compression device; Standing  -     ceFAZolin (ANCEF) 1,000 mg in dextrose 5 % 100 mL IVPB        Subjective:      Patient ID: Isadora Oar is a 47 y o  male  HPI on hemodialysis need for permanent access vein mapping reviewed most optimal is right upper arm AV fistula discussed with patient  Steal syndrome also discussed and what symptoms to look out for      The following portions of the patient's history were reviewed and updated as appropriate: allergies, current medications, past family history, past medical history, past social history, past surgical history and problem list     Review of Systems  end-stage renal disease on hemodialysis, no GI or  system issues, no constitutional systems or skin rash all other systems negative    Objective:      /70 (BP Location: Left arm, Patient Position: Sitting, Cuff Size: Standard)   Pulse 84   Temp 99 9 °F (37 7 °C) (Tympanic)   Ht 5' 4" (1 626 m)   Wt 50 6 kg (111 lb 8 oz)   BMI 19 14 kg/m²          Physical Exam      Tourniquet placed bilateral upper extremities the median cubital vein appears likely by mapping and by clinical evaluation for an AV fistula  I have reviewed and made appropriate changes to the review of systems input by the medical assistant      Vitals:    12/03/19 1459   BP: 134/70   BP Location: Left arm   Patient Position: Sitting   Cuff Size: Standard   Pulse: 84   Temp: 99 9 °F (37 7 °C)   TempSrc: Tympanic   Weight: 50 6 kg (111 lb 8 oz)   Height: 5' 4" (1 626 m)       Patient Active Problem List   Diagnosis    [facility]omyel[facility]    Foot pain    Type 1 diabetes mellitus [facility]    Renal transplant, status post    Sepsis ([facility] )    Acute kidney injury ([facility] )    Osteomyelitis ([facility] )    Poor circulation    Closed fracture of distal end of right tibia with routine healing    Chronic kidney disease, stage IV (severe) ([facility]    Chronic osteomyelitis of tibia ([facility])    Immunosuppression ([facility] )    Hypertension    DKA (diabetic ketoacidoses) ([facility])    Elevated troponin    Diarrhea    Cellulitis of ankle    Non-ST elevation myocardial infarction (NSTEMI), type 2    Urinary retention    Severe sepsis ([facility]    Acute renal failure (ARF) ([facility])    Acute kidney injury superimposed on CKD ([facility])    Metabolic acidosis    Hyperphosphatemia    Gastrointestinal hemorrhage    Bacteremia    S/P AKA (above knee amputation), right ([facility])    Acute blood loss anemia    Acute respiratory failure with hypoxia ([facility]    Pulmonary hypertension ([facility]    Chronic anemia    Depressed left ventricular ejection fraction    Acute pulmonary edema (HCC)    Hx of right BKA (HCC)    Hypertensive urgency    End-stage renal disease needing dialysis [facility])       Past Surgical History:   Procedure Laterality Date    ANKLE FRACTURE SURGERY      ANKLE HARDWARE REMOVAL Right 7/31/2017    Procedure: REMOVAL HARDWARE ANKLE;  Surgeon: Florinda Corrales MD;  [facility];  Service: Orthopedics    ANKLE HARDWARE REMOVAL Right 8/17/2017    Procedure: TIBIA FAILED HARDWARE REMOVAL;  Surgeon: Florinda Corrales MD;  [facility];  Service: Orthopedics    CARDIAC SURGERY      2 stents    CLOSED REDUCTION ANKLE Right 7/3/2017    Procedure: CLOSED REDUCTION DISTAL TIB-FIB AND CASTING VS;  Surgeon: Florinda Corrales MD;  [facility];  Service: Orthopedics    ESOPHAGOGASTRODUODENOSCOPY N/A 12/20/2018    Procedure: ESOPHAGOGASTRODUODENOSCOPY (EGD) in ICU; Surgeon: Sonia Knox MD;  [facility]; Service: Gastroenterology    EYE SURGERY      FRACTURE SURGERY      ORIF Rt Ankle    GLUTAMIC ACID DECARBOXYLASE (HISTORICAL)      IR PERMACATH PLACEMENT  10/21/2019    IR PICC LINE  8/20/2018    IR VENOUS LINE REMOVAL  10/5/2018    LEG AMPUTATION Right 02/01/2019    Above the knee    NEPHRECTOMY TRANSPLANTED ORGAN      SC OPEN TREATMENT FRACTURE DISTAL TIBIA FIBULA Right 7/3/2017    Procedure: OPEN REDUCTION W/ INTERNAL FIXATION (ORIF);   Surgeon: Florinda Corrales MD;  [facility];  Service: Orthopedics    TOE AMPUTATION Right 10/27/2016    Procedure: AMPUTATION TOE;  Surgeon: Yared Judd DPM;  [facility];  Service:        Family History   Problem Relation Age of Onset    Diabetes Brother     Coronary artery disease Mother        Social History     Socioeconomic History    Marital status: /Civil Union     Spouse name: Not on file    Number of children: Not on file    Years of education: Not on file    Highest education level: Not on file   Occupational History    Not on file   Social Needs    Financial resource strain: Not on file    Food insecurity:     Worry: Not on file     Inability: Not on file    Transportation needs:     Medical: Not on file     Non-medical: Not on file   Tobacco Use    Smoking status: Never Smoker    Smokeless tobacco: Never Used   Substance and Sexual Activity    Alcohol use: Not Currently    Drug use: Never    Sexual activity: Yes     Partners: Female   Lifestyle    Physical activity:     Days per week: Not on file     Minutes per session: Not on file    Stress: Not on file   Relationships    Social connections:     Talks on phone: Not on file     Gets together: Not on file     Attends Mandaeism service: Not on file     Active member of club or organization: Not on file     Attends meetings of clubs or organizations: Not on file     Relationship status: Not on file    Intimate partner violence:     Fear of current or ex partner: Not on file     Emotionally abused: Not on file     Physically abused: Not on file     Forced sexual activity: Not on file   Other Topics Concern    Not on file   Social History Narrative    No living will       No Known Allergies      Current Outpatient Medications:     albuterol (2 5 mg/3 mL) 0 083 % nebulizer solution, Inhale 2 5 mg every 4 (four) hours as needed , Disp: , Rfl:     aspirin 81 mg chewable tablet, Chew 81 mg daily, Disp: , Rfl:     atorvastatin (LIPITOR) 80 mg tablet, Take 80 mg by mouth daily , Disp: , Rfl:     azaTHIOprine (IMURAN) 50 mg tablet, Take 50 mg by mouth daily , Disp: , Rfl:     carvedilol (COREG) 6 25 mg tablet, Take 1 tablet (6 25 mg total) by mouth 2 (two) times a day with meals, Disp: 180 tablet, Rfl: 1    Cholecalciferol 25 MCG (1000 UT) tablet, Take 1 tablet (1,000 Units total) by mouth daily, Disp: 90 tablet, Rfl: 1    hydrALAZINE (APRESOLINE) 50 mg tablet, Take 1 tablet (50 mg total) by mouth every 8 (eight) hours, Disp: 270 tablet, Rfl: 1    insulin detemir (LEVEMIR) 100 units/mL subcutaneous injection, Inject 7 Units under the skin 2 (two) times a day, Disp: , Rfl:     insulin lispro (HumaLOG) 100 units/mL injection, Inject 10 Units under the skin as needed , Disp: , Rfl:     Lancets (ONETOUCH DELICA PLUS [id number]) MISC, , Disp: , Rfl: 5    metoprolol succinate (TOPROL-XL) 25 mg 24 hr tablet, Take 2 tablets (50 mg total) by mouth daily, Disp: 180 tablet, Rfl: 1    NIFEdipine (PROCARDIA XL) 90 mg 24 hr tablet, Take 1 tablet (90 mg total) by mouth daily, Disp: 90 tablet, Rfl: 1    ONE TOUCH ULTRA TEST test strip, CHECK BLOOD SUGAR 5 TO 6 TIMES DAILY, Disp: 600 each, Rfl: 5    pantoprazole (PROTONIX) 40 mg tablet, Take 1 tablet (40 mg total) by mouth daily in the early morning, Disp: 90 tablet, Rfl: 1    predniSONE 5 mg tablet, Take 5 mg by mouth daily, Disp: , Rfl:     tacrolimus (PROGRAF) 1 mg capsule, Take 3 mg by mouth every morning, Disp: , Rfl:     tacrolimus (PROGRAF) 1 mg capsule, Take 2 mg by mouth daily at bedtime, Disp: , Rfl:     glucagon (GLUCAGON EMERGENCY) 1 MG injection, Inject 1 mg under the skin once as needed for low blood sugar for up to 1 dose (Patient not taking: Reported on 12/3/2019), Disp: 3 kit, Rfl: 5  No current facility-administered medications for this visit

## 2019-12-23 ENCOUNTER — CONSULT (OUTPATIENT)
Dept: CARDIOLOGY CLINIC | Facility: CLINIC | Age: 49
End: 2019-12-23
Payer: COMMERCIAL

## 2019-12-23 VITALS
WEIGHT: 120 LBS | DIASTOLIC BLOOD PRESSURE: 58 MMHG | HEART RATE: 60 BPM | BODY MASS INDEX: 20.49 KG/M2 | SYSTOLIC BLOOD PRESSURE: 134 MMHG | HEIGHT: 64 IN

## 2019-12-23 DIAGNOSIS — I25.10 CORONARY ARTERY DISEASE INVOLVING NATIVE CORONARY ARTERY OF NATIVE HEART WITHOUT ANGINA PECTORIS: ICD-10-CM

## 2019-12-23 DIAGNOSIS — Z99.2 ESRD (END STAGE RENAL DISEASE) ON DIALYSIS (HCC): ICD-10-CM

## 2019-12-23 DIAGNOSIS — N18.6 ESRD (END STAGE RENAL DISEASE) ON DIALYSIS (HCC): ICD-10-CM

## 2019-12-23 DIAGNOSIS — I21.A1 TYPE 2 MYOCARDIAL INFARCTION WITHOUT ST ELEVATION (HCC): Primary | ICD-10-CM

## 2019-12-23 DIAGNOSIS — Z01.810 PRE-OPERATIVE CARDIOVASCULAR EXAMINATION: ICD-10-CM

## 2019-12-23 PROCEDURE — 99244 OFF/OP CNSLTJ NEW/EST MOD 40: CPT | Performed by: INTERNAL MEDICINE

## 2019-12-23 RX ORDER — CALCIUM CARBONATE 200(500)MG
500 TABLET,CHEWABLE ORAL 2 TIMES DAILY
COMMUNITY
End: 2020-11-14

## 2019-12-23 NOTE — PROGRESS NOTES
Patient ID: Trenton Vergara is a 47 y o  male  Plan:      Coronary artery disease involving native coronary artery of native heart without angina pectoris    No symptoms at present but ambulation does not occur at this point  He may be having transplant surgery as well as AV fistula shortly  Shyrl Her will be scheduled  Pre-operative cardiovascular examination    Unless the Lexiscan Myoview significantly abnormal, okay to proceed with fistula formation  ESRD (end stage renal disease) on dialysis [facility])    On the transplant list at [facility]  Follow up Plan:    If the stress test is not significantly abnormal then okay for surgery as planned  He will continue to have his routine cardiology follow-up at [facility]  HPI:   The patient is seen today in consultation from Dr Severa Barefoot preoperatively for fistula placement  The patient has had diabetes since childhood  His current transplant is only marginally working  This was from his father  He is now on hemodialysis and fistula placement is planned  He is also on a list for a new transplant at [facility]  No recent chest pain or chest pressure  In 2018 he had cardiac arrest and ultimately was found of significant CAD  He underwent drug-eluting stent of the circumflex and proximal left anterior descending artery  Is also known to have a small VSD  There is prior right AKA post osteomyelitis  Most recent or relevant cardiac/vascular testing:    Feb 2018: PCI/MARC to his prox LCx and ostial LAD and EF improved to 40% at discharge           Past Surgical History:   Procedure Laterality Date    ANKLE FRACTURE SURGERY      ANKLE HARDWARE REMOVAL Right 7/31/2017    Procedure: REMOVAL HARDWARE ANKLE;  Surgeon: Cj Medina MD;  [facility];  Service: Orthopedics    ANKLE HARDWARE REMOVAL Right 8/17/2017    Procedure: TIBIA FAILED HARDWARE REMOVAL;  Surgeon: Cj Medina MD;  [facility];  Service: Orthopedics    CARDIAC SURGERY      2 stents    CLOSED REDUCTION ANKLE Right 7/3/2017    Procedure: CLOSED REDUCTION DISTAL TIB-FIB AND CASTING VS;  Surgeon: Shirin Pace MD;  [facility];  Service: Orthopedics    CORONARY ANGIOPLASTY WITH STENT PLACEMENT  02/2018    CAD s/p MARC to LCx, pLAD    ESOPHAGOGASTRODUODENOSCOPY N/A 12/20/2018    Procedure: ESOPHAGOGASTRODUODENOSCOPY (EGD) in ICU; Surgeon: Siva Vogt MD;  [facility]; Service: Gastroenterology    EYE SURGERY      FRACTURE SURGERY      ORIF Rt Ankle    GLUTAMIC ACID DECARBOXYLASE (HISTORICAL)      IR PERMACATH PLACEMENT  10/21/2019    IR PICC LINE  8/20/2018    IR VENOUS LINE REMOVAL  10/5/2018    LEG AMPUTATION Right 02/01/2019    Above the knee    NEPHRECTOMY TRANSPLANTED ORGAN      NE OPEN TREATMENT FRACTURE DISTAL TIBIA FIBULA Right 7/3/2017    Procedure: OPEN REDUCTION W/ INTERNAL FIXATION (ORIF); Surgeon: Shirin Pace MD;  [facility];  Service: Orthopedics    TOE AMPUTATION Right 10/27/2016    Procedure: AMPUTATION TOE;  Surgeon: Rickey Carson DPM;  [facility];  Service:      CMP:   Lab Results   Component Value Date     11/06/2015    K 4 7 12/02/2019    K 4 4 11/06/2015    CL 99 (L) 12/02/2019     11/06/2015    CO2 24 12/02/2019    CO2 13 (L) 02/14/2018    BUN 60 (H) 12/02/2019    BUN 31 (H) 11/06/2015    CREATININE 4 30 (H) 12/02/2019    CREATININE 1 26 11/06/2015    GLUCOSE 460 (H) 02/14/2018    GLUCOSE 111 11/06/2015    EGFR 15 12/02/2019    EGFR 38 02/14/2018       Lipid Profile:   Lab Results   Component Value Date    CHOL 105 08/18/2015    TRIG 86 02/15/2019    TRIG 136 08/18/2015    HDL 37 (L) 02/15/2019    HDL 31 08/18/2015         Review of Systems   10  point ROS  was otherwise non pertinent or negative except as per HPI or as below     Gait: Wheelchair        Objective:     /58   Pulse 60   Ht 5' 4" (1 626 m)   Wt 54 4 kg (120 lb)   BMI 20 60 kg/m²     PHYSICAL EXAM:    General: Normal appearance in no distress  Eyes:  Anicteric  Oral mucosa:  Moist   Neck:  No JVD  Carotid upstrokes are brisk without bruits  No masses  Chest:  Clear to auscultation and percussion  Cardiac:  Normal PMI  Normal S1 and S2  No murmur gallop or rub  Abdomen:  Soft and nontender  No palpable organomegaly or aortic enlargement  Extremities:  No peripheral edema  Right BKA  Musculoskeletal:  Symmetric  Vascular:  Femoral pulses are brisk without bruits  Neuro:  Grossly symmetric  Psych:  Alert and oriented x3          Current Outpatient Medications:     albuterol (2 5 mg/3 mL) 0 083 % nebulizer solution, Inhale 2 5 mg every 4 (four) hours as needed , Disp: , Rfl:     aspirin 81 mg chewable tablet, Chew 81 mg daily, Disp: , Rfl:     atorvastatin (LIPITOR) 80 mg tablet, Take 80 mg by mouth daily , Disp: , Rfl:     azaTHIOprine (IMURAN) 50 mg tablet, Take 50 mg by mouth daily , Disp: , Rfl:     calcium carbonate (TUMS) 500 mg chewable tablet, Chew 500 mg, Disp: , Rfl:     carvedilol (COREG) 6 25 mg tablet, Take 1 tablet (6 25 mg total) by mouth 2 (two) times a day with meals, Disp: 180 tablet, Rfl: 1    Cholecalciferol 25 MCG (1000 UT) tablet, Take 1 tablet (1,000 Units total) by mouth daily, Disp: 90 tablet, Rfl: 1    hydrALAZINE (APRESOLINE) 50 mg tablet, Take 1 tablet (50 mg total) by mouth every 8 (eight) hours, Disp: 270 tablet, Rfl: 1    insulin detemir (LEVEMIR) 100 units/mL subcutaneous injection, Inject 7 Units under the skin 2 (two) times a day, Disp: , Rfl:     insulin lispro (HumaLOG) 100 units/mL injection, Inject 10 Units under the skin as needed , Disp: , Rfl:     Lancets (ONETOUCH DELICA PLUS [id number]) MISC, , Disp: , Rfl: 5    metoprolol succinate (TOPROL-XL) 25 mg 24 hr tablet, Take 2 tablets (50 mg total) by mouth daily, Disp: 180 tablet, Rfl: 1    NIFEdipine (PROCARDIA XL) 90 mg 24 hr tablet, Take 1 tablet (90 mg total) by mouth daily, Disp: 90 tablet, Rfl: 1    ONE TOUCH ULTRA TEST test strip, CHECK BLOOD SUGAR 5 TO 6 TIMES DAILY, Disp: 600 each, Rfl: 5    pantoprazole (PROTONIX) 40 mg tablet, Take 1 tablet (40 mg total) by mouth daily in the early morning, Disp: 90 tablet, Rfl: 1    predniSONE 5 mg tablet, Take 5 mg by mouth daily, Disp: , Rfl:     tacrolimus (PROGRAF) 1 mg capsule, Take 3 mg by mouth every morning, Disp: , Rfl:     tacrolimus (PROGRAF) 1 mg capsule, Take 2 mg by mouth daily at bedtime, Disp: , Rfl:     glucagon (GLUCAGON EMERGENCY) 1 MG injection, Inject 1 mg under the skin once as needed for low blood sugar for up to 1 dose (Patient not taking: Reported on 12/3/2019), Disp: 3 kit, Rfl: 5  No Known Allergies  Past Medical History:   Diagnosis Date    Bacteremia 12/21/2018    CAD (coronary artery disease)     s/p MARC to LCx, pLAD 2/2018    Cardiac arrest ([facility] )     Diabetes mellitus type 1 ([facility] )     [facility])     Access in right chest    GI bleed     Hyperlipidemia     Hypertension     Infection at site of external fixator pin ([facility] )     MI (myocardial infarction) ([facility] )     Pneumonia     Last Assessed [id number]    Renal failure     Renal transplant, status post 07/21/2007           Social History     Tobacco Use   Smoking Status Never Smoker   Smokeless Tobacco Never Used

## 2019-12-23 NOTE — ASSESSMENT & PLAN NOTE
No symptoms at present but ambulation does not occur at this point  He may be having transplant surgery as well as AV fistula shortly  Freddie Romero will be scheduled

## 2020-01-07 ENCOUNTER — TELEPHONE (OUTPATIENT)
Dept: INTERVENTIONAL RADIOLOGY/VASCULAR | Facility: HOSPITAL | Age: 50
End: 2020-01-07

## 2020-01-07 NOTE — TELEPHONE ENCOUNTER
Called patient to schedule permacath exchange  Patient scheduled for 1/8/20 at 1430 a [facility]  Consult complete

## 2020-01-08 ENCOUNTER — TRANSCRIBE ORDERS (OUTPATIENT)
Dept: ADMINISTRATIVE | Facility: HOSPITAL | Age: 50
End: 2020-01-08

## 2020-01-08 ENCOUNTER — HOSPITAL ENCOUNTER (OUTPATIENT)
Dept: RADIOLOGY | Facility: HOSPITAL | Age: 50
Discharge: HOME | End: 2020-01-08
Attending: INTERNAL MEDICINE
Payer: COMMERCIAL

## 2020-01-08 VITALS
OXYGEN SATURATION: 96 % | HEART RATE: 69 BPM | SYSTOLIC BLOOD PRESSURE: 126 MMHG | DIASTOLIC BLOOD PRESSURE: 59 MMHG | RESPIRATION RATE: 16 BRPM

## 2020-01-08 DIAGNOSIS — Z01.810 PRE-OPERATIVE CARDIOVASCULAR EXAMINATION: Primary | ICD-10-CM

## 2020-01-08 DIAGNOSIS — T82.49XA COMPLICATIONS, MECHANICAL, CATHETER, DIALYSIS, INITIAL ENCOUNTER (HCC): ICD-10-CM

## 2020-01-08 PROCEDURE — 36581 REPLACE TUNNELED CV CATH: CPT | Performed by: RADIOLOGY

## 2020-01-08 PROCEDURE — 77001 FLUOROGUIDE FOR VEIN DEVICE: CPT | Performed by: RADIOLOGY

## 2020-01-08 PROCEDURE — 77001 FLUOROGUIDE FOR VEIN DEVICE: CPT

## 2020-01-08 PROCEDURE — C1750 CATH, HEMODIALYSIS,LONG-TERM: HCPCS

## 2020-01-08 PROCEDURE — C1769 GUIDE WIRE: HCPCS

## 2020-01-08 PROCEDURE — 36581 REPLACE TUNNELED CV CATH: CPT

## 2020-01-08 RX ORDER — LIDOCAINE HYDROCHLORIDE 10 MG/ML
INJECTION, SOLUTION EPIDURAL; INFILTRATION; INTRACAUDAL; PERINEURAL CODE/TRAUMA/SEDATION MEDICATION
Status: COMPLETED | OUTPATIENT
Start: 2020-01-08 | End: 2020-01-08

## 2020-01-08 RX ADMIN — LIDOCAINE HYDROCHLORIDE 10 ML: 10 INJECTION, SOLUTION EPIDURAL; INFILTRATION; INTRACAUDAL; PERINEURAL at 15:38

## 2020-01-08 RX ADMIN — LIDOCAINE HYDROCHLORIDE 10 ML: 10 INJECTION, SOLUTION EPIDURAL; INFILTRATION; INTRACAUDAL; PERINEURAL at 15:35

## 2020-01-08 RX ADMIN — IOHEXOL 10 ML: 300 INJECTION, SOLUTION INTRAVENOUS at 15:21

## 2020-01-08 RX ADMIN — HEPARIN SODIUM (PORCINE) LOCK FLUSH IV SOLN 100 UNIT/ML 5 ML: 100 SOLUTION at 15:43

## 2020-01-08 NOTE — DISCHARGE INSTRUCTIONS
[address]  Interventional Radiology  Dr Adin Ravi: [phone] (M-F 7:30am - 4:00pm)  Micheal: [phone] (Off hours or no answer)        Perma-cath Placement   WHAT YOU NEED TO KNOW:   A perma-cath is a catheter placed through a vein into or near your right atrium  Your right atrium is the right upper chamber of your heart  A perma-cath is used for dialysis in an emergency or until a long-term device is ready to use  After your procedure, you will have some pain and swelling on your chest and neck  You may have some bruises on your chest and neck  You may also have 2 dressings, one on your chest and one on your neck  DISCHARGE INSTRUCTIONS:   Call 911 for any of the following:   · You feel lightheaded, short of breath, and have chest pain  · Your catheter comes out   Contact Interventional Radiology at [phone] [place] PATIENTS: Contact Interventional Radiology at [phone]) Kavitha Marcus PATIENTS: Contact Interventional Radiology at [phone]) if:  · Blood soaks through your bandage  · You have new swelling in your arm, neck, face, or chest on your right side  · Your catheter gets wet  · Your bruises or pain get worse  · You have a fever or chills  · Persistent nausea or vomiting  · Your incision is red, swollen, or draining pus  · You have questions or concerns about your condition or care  Self-care:       · Resume your normal diet  · Keep your dressings dry  Do not take a shower or swim  You may take a tub bath, but do not get your dressings wet  Water in your wound can cause bacteria to grow and cause an infection  If your dressing gets wet, dry it off and cover it with dry sterile gauze  Call your healthcare provider  Do not use soaps or ointments  · Do not change your dressings  Your healthcare provider or dialysis nurse will change your dressings   Your dressings should stay in place until your healthcare provider removes them  The dressing on your chest will stay as long as you have the catheter in place  The dressing prevents infection  · Do not remove the red and blue caps from the end of your catheter  The caps prevent air from getting into your catheter    Follow up with your healthcare provider as directed: Write down your questions so you remember to ask them during your visits

## 2020-01-08 NOTE — PROCEDURES
Central Line Insertion  Date/Time: 1/8/2020 4:31 PM  Performed by: Mercedes Casey MD  Authorized by: Mercedes Casey MD     Patient location:  IR  Consent:     Consent obtained:  Written    Consent given by:  Patient    Risks discussed:  Bleeding and infection    Alternatives discussed:  No treatment and delayed treatment  Universal protocol:     Procedure explained and questions answered to patient or proxy's satisfaction: yes      Relevant documents present and verified: yes      Test results available and properly labeled: yes      Radiology Images displayed and confirmed  If images not available, report reviewed: yes      Required blood products, implants, devices, and special equipment available: yes      Site/side marked: yes      Immediately prior to procedure, a time out was called: yes      Patient identity confirmed:  Verbally with patient and arm band  Pre-procedure details:     Hand hygiene: Hand hygiene performed prior to insertion      Sterile barrier technique: All elements of maximal sterile technique followed      Skin preparation:  2% chlorhexidine    Skin preparation agent: Skin preparation agent completely dried prior to procedure    Indications:     Central line indications: dialysis      Site selection rationale:  Change over a wire  No choice    Anesthesia (see MAR for exact dosages):      Anesthesia method:  Local infiltration    Local anesthetic:  Lidocaine 1% WITH epi  Procedure details:     Location:  Right internal jugular    Vessel type: vein      Laterality:  Right    Approach: percutaneous technique used      Patient position:  Flat    Catheter type:  Double lumen    Catheter size:  14 Fr    Landmarks identified: yes      Ultrasound guidance: yes      Sterile ultrasound techniques: Sterile gel and sterile probe covers were used      Number of attempts:  1    Successful placement: yes      Vessel of catheter tip end:  RA  Post-procedure details:     Post-procedure:  Dressing applied    Assessment:  Blood return through all ports, no pneumothorax on x-ray and placement verified by x-ray    Post-procedure complications: none      Patient tolerance of procedure: Tolerated well, no immediate complications  Comments:      Change over a wire  The red clamp was broken

## 2020-01-13 ENCOUNTER — HOSPITAL ENCOUNTER (OUTPATIENT)
Dept: NUCLEAR MEDICINE | Facility: HOSPITAL | Age: 50
Discharge: HOME | End: 2020-01-13
Payer: COMMERCIAL

## 2020-01-13 ENCOUNTER — HOSPITAL ENCOUNTER (OUTPATIENT)
Dept: CT IMAGING | Facility: HOSPITAL | Age: 50
Discharge: HOME | End: 2020-01-13
Payer: COMMERCIAL

## 2020-01-13 DIAGNOSIS — Z01.810 PRE-OPERATIVE CARDIOVASCULAR EXAMINATION: ICD-10-CM

## 2020-01-13 DIAGNOSIS — I21.A1 TYPE 2 MYOCARDIAL INFARCTION WITHOUT ST ELEVATION (HCC): ICD-10-CM

## 2020-01-13 LAB
CHEST PAIN STATEMENT: NORMAL
MAX DIASTOLIC BP: 70 MMHG
MAX HEART RATE: 80 BPM
MAX PREDICTED HEART RATE: 170 BPM
MAX. SYSTOLIC BP: 132 MMHG
PROTOCOL NAME: NORMAL
REASON FOR TERMINATION: NORMAL
TARGET HR FORMULA: NORMAL
TEST INDICATION: NORMAL
TIME IN EXERCISE PHASE: NORMAL

## 2020-01-13 PROCEDURE — 93017 CV STRESS TEST TRACING ONLY: CPT

## 2020-01-13 PROCEDURE — 78452 HT MUSCLE IMAGE SPECT MULT: CPT | Performed by: INTERNAL MEDICINE

## 2020-01-13 PROCEDURE — 93016 CV STRESS TEST SUPVJ ONLY: CPT | Performed by: INTERNAL MEDICINE

## 2020-01-13 PROCEDURE — 74150 CT ABDOMEN W/O CONTRAST: CPT

## 2020-01-13 PROCEDURE — 93018 CV STRESS TEST I&R ONLY: CPT | Performed by: INTERNAL MEDICINE

## 2020-01-13 PROCEDURE — A9502 TC99M TETROFOSMIN: HCPCS

## 2020-01-13 PROCEDURE — 78452 HT MUSCLE IMAGE SPECT MULT: CPT

## 2020-01-13 RX ADMIN — REGADENOSON 0.4 MG: 0.08 INJECTION, SOLUTION INTRAVENOUS at 09:52

## 2020-01-13 RX ADMIN — IOHEXOL 50 ML: 240 INJECTION, SOLUTION INTRATHECAL; INTRAVASCULAR; INTRAVENOUS; ORAL at 10:07

## 2020-01-14 ENCOUNTER — DOCUMENTATION (OUTPATIENT)
Dept: CARDIOLOGY CLINIC | Facility: CLINIC | Age: 50
End: 2020-01-14

## 2020-01-14 ENCOUNTER — TELEPHONE (OUTPATIENT)
Dept: CARDIOLOGY CLINIC | Facility: CLINIC | Age: 50
End: 2020-01-14

## 2020-01-14 NOTE — TELEPHONE ENCOUNTER
I spoke to [facility] office may need a little note in the chart stating he is cleared for surgery?

## 2020-01-14 NOTE — TELEPHONE ENCOUNTER
----- Message from Orlin Chery MD sent at 1/14/2020  9:46 AM EST -----  Stress test only minimally abnormal   No further cardiac testing needed in the near term

## 2020-01-14 NOTE — PROGRESS NOTES
Stress test reviewed  Mild abnormality noted  OK to proceed with dialysis access creation at reasonable risk

## 2020-01-15 ENCOUNTER — PREP FOR PROCEDURE (OUTPATIENT)
Dept: VASCULAR SURGERY | Facility: CLINIC | Age: 50
End: 2020-01-15

## 2020-01-15 ENCOUNTER — TELEPHONE (OUTPATIENT)
Dept: VASCULAR SURGERY | Facility: CLINIC | Age: 50
End: 2020-01-15

## 2020-01-15 PROBLEM — N18.9 CHRONIC KIDNEY DISEASE, UNSPECIFIED: Status: ACTIVE | Noted: 2020-01-15

## 2020-01-15 NOTE — TELEPHONE ENCOUNTER
Spoke with pt to schedule surgery on 2-11-20 at [facility]/OR with Dr Alisha Newman  Pt will go for labs to   Pt was told npo from midnight,hospital will call with arrival time, no hold on ASA  Pt has Dialysis on [facility],I have told pt will request to re arrange dialysis to accommodate surgery  SJ

## 2020-01-17 ENCOUNTER — HOSPITAL ENCOUNTER (OUTPATIENT)
Dept: NON INVASIVE DIAGNOSTICS | Facility: HOSPITAL | Age: 50
Discharge: HOME | End: 2020-01-17
Payer: COMMERCIAL

## 2020-01-17 DIAGNOSIS — Z01.810 PRE-OPERATIVE CARDIOVASCULAR EXAMINATION: ICD-10-CM

## 2020-01-17 PROCEDURE — 93306 TTE W/DOPPLER COMPLETE: CPT

## 2020-01-17 PROCEDURE — 93306 TTE W/DOPPLER COMPLETE: CPT | Performed by: INTERNAL MEDICINE

## 2020-01-30 ENCOUNTER — TELEPHONE (OUTPATIENT)
Dept: SLEEP CENTER | Facility: CLINIC | Age: 50
End: 2020-01-30

## 2020-01-30 NOTE — TELEPHONE ENCOUNTER
----- Message from Richardson Chou MD sent at 1/28/2020  4:09 PM EST -----  Approved  ----- Message -----  From: Amanda Nelson MA  Sent: 1/27/2020   9:48 AM EST  To: Sleep Medicine [place] Provider    This sleep study needs approval      If approved please sign and return to clerical pool  If denied please include reasons why  Also provide alternative testing if warranted  Please sign and return to clerical pool

## 2020-02-03 DIAGNOSIS — N17.9 ACUTE KIDNEY INJURY (HCC): Primary | ICD-10-CM

## 2020-02-03 RX ORDER — PREDNISONE 1 MG/1
5 TABLET ORAL DAILY
Qty: 90 TABLET | Refills: 2 | Status: SHIPPED | OUTPATIENT
Start: 2020-02-03 | End: 2020-04-14 | Stop reason: SDUPTHER

## 2020-02-06 NOTE — PRE-PROCEDURE INSTRUCTIONS
Pre-Surgery Instructions:   Medication Instructions    albuterol (2 5 mg/3 mL) 0 083 % nebulizer solution Instructed patient per Anesthesia Guidelines  Use as needed DOS    aspirin 81 mg chewable tablet Patient was instructed by Physician and understands  Last dose on 2/10    atorvastatin (LIPITOR) 80 mg tablet Instructed patient per Anesthesia Guidelines  Last dose on 2/10    azaTHIOprine (IMURAN) 50 mg tablet Instructed patient per Anesthesia Guidelines  Take on DOS 2/11    calcium carbonate (TUMS) 500 mg chewable tablet Instructed patient per Anesthesia Guidelines  Last dose on 2/10    carvedilol (COREG) 6 25 mg tablet Instructed patient per Anesthesia Guidelines  Take on DOS    Cholecalciferol 25 MCG (1000 UT) tablet Instructed patient per Anesthesia Guidelines  Last dose on 2/10    glucagon (GLUCAGON EMERGENCY) 1 MG injection Instructed patient per Anesthesia Guidelines  Take as needed    hydrALAZINE (APRESOLINE) 50 mg tablet Instructed patient per Anesthesia Guidelines  Last dose on 2/10    insulin detemir (LEVEMIR) 100 units/mL subcutaneous injection Instructed patient per Anesthesia Guidelines  half evening dose on 2/10  No insulin 2/11    insulin lispro (HumaLOG) 100 units/mL injection Instructed patient per Anesthesia Guidelines  Last dose on 2/10    metoprolol succinate (TOPROL-XL) 25 mg 24 hr tablet Instructed patient per Anesthesia Guidelines  Take on DOS    NIFEdipine (PROCARDIA XL) 90 mg 24 hr tablet Instructed patient per Anesthesia Guidelines  Take on DOS    pantoprazole (PROTONIX) 40 mg tablet Instructed patient per Anesthesia Guidelines  Take on DOS    predniSONE 5 mg tablet Instructed patient per Anesthesia Guidelines  Take on DOS    tacrolimus (PROGRAF) 1 mg capsule Instructed patient per Anesthesia Guidelines  Take am dose on DOS    tacrolimus (PROGRAF) 1 mg capsule Instructed patient per Anesthesia Guidelines       [facility]    My Surgical Experience    The following information was developed to assist you to prepare for your operation  What do I need to do before coming to the hospital?   Arrange for a responsible person to drive you to and from the hospital    Arrange care for your children at home  Children are not allowed in the recovery areas of the hospital  Plan to wear clothing that is easy to put on and take off; loose shirt  Bathing  o Shower the evening before and the morning of your surgery with Hibiclens, an antibacterial soap  I reviewed the Pre Op Showering Instructions for Surgery Patients Sheet   o Remove nail polish and all body piercing jewelry; romario right arm  o Do not shave any body part for at least 24 hours before surgery-this includes face, arms, legs and upper body  Food  o Nothing to eat or drink after midnight the night before your surgery  This includes candy and chewing gum  o Do not drink alcohol 24 hours before surgery  Medicine  o Follow instructions you received from your surgeon about which medicines you may take on the day of surgery  o If instructed to take medicine on the morning of surgery, take pills with just a small sip of water  Call your prescribing doctor for specific infroamtion on what to do if you take insulin    What should I bring to the hospital?    Bring:   A list of the daily medicines, vitamins, minerals, herbals and nutritional supplements you take  Include the dosages of medicines and the time you take them each day   Glasses, dentures or hearing aids   Minimal clothing; you will be wearing hospital sleepwear   Photo ID; required to verify your identity   If you have a Living Will or Power of , bring a copy of the documents   If you have an ostomy, bring an extra pouch and any supplies you use    Do not bring   Medicines or inhalers   Money, valuables or jewelry    What other information should I know about the day of surgery?    Notify your surgeons if you develop a cold, sore throat, cough, fever, rash or any other illness   Report to the Ambulatory Surgical/Same Day Surgery Unit   You will be instructed to stop at Registration only if you have not been pre-registered   Inform your  fi they do not stay that they will be asked by the staff to leave a phone number where they can be reached   Be available to be reached before surgery  In the event the operating room schedule changes, you may be asked to come in earlier or later than expected    *It is important to tell your doctor and others involved in your health care if you are taking or have been taking any non-prescription drugs, vitamins, minerals, herbals or other nutritional supplements   Any of these may interact with some food or medicines and cause a reaction

## 2020-02-10 ENCOUNTER — ANESTHESIA EVENT (OUTPATIENT)
Dept: PERIOP | Facility: HOSPITAL | Age: 50
End: 2020-02-10
Payer: COMMERCIAL

## 2020-02-10 RX ORDER — SODIUM CHLORIDE, SODIUM LACTATE, POTASSIUM CHLORIDE, CALCIUM CHLORIDE 600; 310; 30; 20 MG/100ML; MG/100ML; MG/100ML; MG/100ML
50 INJECTION, SOLUTION INTRAVENOUS CONTINUOUS
Status: CANCELLED | OUTPATIENT
Start: 2020-02-10

## 2020-02-11 ENCOUNTER — ANESTHESIA (OUTPATIENT)
Dept: PERIOP | Facility: HOSPITAL | Age: 50
End: 2020-02-11
Payer: COMMERCIAL

## 2020-02-11 ENCOUNTER — HOSPITAL ENCOUNTER (OUTPATIENT)
Facility: HOSPITAL | Age: 50
Setting detail: OUTPATIENT SURGERY
Discharge: HOME | End: 2020-02-11
Attending: SURGERY | Admitting: SURGERY
Payer: COMMERCIAL

## 2020-02-11 VITALS
WEIGHT: 120 LBS | OXYGEN SATURATION: 90 % | RESPIRATION RATE: 18 BRPM | HEIGHT: 64 IN | BODY MASS INDEX: 20.49 KG/M2 | HEART RATE: 72 BPM | DIASTOLIC BLOOD PRESSURE: 58 MMHG | SYSTOLIC BLOOD PRESSURE: 122 MMHG | TEMPERATURE: 97.7 F

## 2020-02-11 DIAGNOSIS — Z99.2 STAGE 5 CHRONIC KIDNEY DISEASE ON CHRONIC DIALYSIS (HCC): Primary | ICD-10-CM

## 2020-02-11 DIAGNOSIS — N18.6 STAGE 5 CHRONIC KIDNEY DISEASE ON CHRONIC DIALYSIS (HCC): Primary | ICD-10-CM

## 2020-02-11 LAB
GLUCOSE SERPL-MCNC: 244 MG/DL (ref 65–140)
GLUCOSE SERPL-MCNC: 292 MG/DL (ref 65–140)

## 2020-02-11 PROCEDURE — 36821 AV FUSION DIRECT ANY SITE: CPT | Performed by: SURGERY

## 2020-02-11 PROCEDURE — 82948 REAGENT STRIP/BLOOD GLUCOSE: CPT

## 2020-02-11 PROCEDURE — 36821 AV FUSION DIRECT ANY SITE: CPT | Performed by: PHYSICIAN ASSISTANT

## 2020-02-11 RX ORDER — OXYCODONE HYDROCHLORIDE AND ACETAMINOPHEN 5; 325 MG/1; MG/1
1 TABLET ORAL EVERY 4 HOURS PRN
Qty: 10 TABLET | Refills: 0 | Status: SHIPPED | OUTPATIENT
Start: 2020-02-11 | End: 2020-02-21

## 2020-02-11 RX ORDER — METOCLOPRAMIDE HYDROCHLORIDE 5 MG/ML
10 INJECTION INTRAMUSCULAR; INTRAVENOUS ONCE AS NEEDED
Status: DISCONTINUED | OUTPATIENT
Start: 2020-02-11 | End: 2020-02-11 | Stop reason: HOSPADM

## 2020-02-11 RX ORDER — CHLORHEXIDINE GLUCONATE 0.12 MG/ML
15 RINSE ORAL ONCE
Status: COMPLETED | OUTPATIENT
Start: 2020-02-11 | End: 2020-02-11

## 2020-02-11 RX ORDER — OXYCODONE HYDROCHLORIDE AND ACETAMINOPHEN 5; 325 MG/1; MG/1
1 TABLET ORAL EVERY 4 HOURS PRN
Status: DISCONTINUED | OUTPATIENT
Start: 2020-02-11 | End: 2020-02-11 | Stop reason: HOSPADM

## 2020-02-11 RX ORDER — SODIUM CHLORIDE 9 MG/ML
INJECTION, SOLUTION INTRAVENOUS CONTINUOUS PRN
Status: DISCONTINUED | OUTPATIENT
Start: 2020-02-11 | End: 2020-02-11 | Stop reason: SURG

## 2020-02-11 RX ORDER — CEFAZOLIN SODIUM 1 G/50ML
1000 SOLUTION INTRAVENOUS ONCE
Status: COMPLETED | OUTPATIENT
Start: 2020-02-11 | End: 2020-02-11

## 2020-02-11 RX ORDER — PROMETHAZINE HYDROCHLORIDE 25 MG/ML
12.5 INJECTION, SOLUTION INTRAMUSCULAR; INTRAVENOUS ONCE AS NEEDED
Status: DISCONTINUED | OUTPATIENT
Start: 2020-02-11 | End: 2020-02-11 | Stop reason: HOSPADM

## 2020-02-11 RX ORDER — LABETALOL 20 MG/4 ML (5 MG/ML) INTRAVENOUS SYRINGE
AS NEEDED
Status: DISCONTINUED | OUTPATIENT
Start: 2020-02-11 | End: 2020-02-11 | Stop reason: SURG

## 2020-02-11 RX ORDER — HEPARIN SODIUM 1000 [USP'U]/ML
INJECTION, SOLUTION INTRAVENOUS; SUBCUTANEOUS AS NEEDED
Status: DISCONTINUED | OUTPATIENT
Start: 2020-02-11 | End: 2020-02-11 | Stop reason: SURG

## 2020-02-11 RX ORDER — FENTANYL CITRATE 50 UG/ML
INJECTION, SOLUTION INTRAMUSCULAR; INTRAVENOUS AS NEEDED
Status: DISCONTINUED | OUTPATIENT
Start: 2020-02-11 | End: 2020-02-11 | Stop reason: SURG

## 2020-02-11 RX ORDER — BUPIVACAINE HYDROCHLORIDE AND EPINEPHRINE 5; 5 MG/ML; UG/ML
INJECTION, SOLUTION EPIDURAL; INTRACAUDAL; PERINEURAL AS NEEDED
Status: DISCONTINUED | OUTPATIENT
Start: 2020-02-11 | End: 2020-02-11 | Stop reason: HOSPADM

## 2020-02-11 RX ORDER — LIDOCAINE HYDROCHLORIDE 10 MG/ML
INJECTION, SOLUTION EPIDURAL; INFILTRATION; INTRACAUDAL; PERINEURAL AS NEEDED
Status: DISCONTINUED | OUTPATIENT
Start: 2020-02-11 | End: 2020-02-11 | Stop reason: HOSPADM

## 2020-02-11 RX ORDER — FENTANYL CITRATE/PF 50 MCG/ML
25 SYRINGE (ML) INJECTION
Status: DISCONTINUED | OUTPATIENT
Start: 2020-02-11 | End: 2020-02-11 | Stop reason: HOSPADM

## 2020-02-11 RX ORDER — PROPOFOL 10 MG/ML
INJECTION, EMULSION INTRAVENOUS CONTINUOUS PRN
Status: DISCONTINUED | OUTPATIENT
Start: 2020-02-11 | End: 2020-02-11 | Stop reason: SURG

## 2020-02-11 RX ORDER — SODIUM CHLORIDE 9 MG/ML
50 INJECTION, SOLUTION INTRAVENOUS CONTINUOUS
Status: CANCELLED | OUTPATIENT
Start: 2020-02-11

## 2020-02-11 RX ORDER — ONDANSETRON 2 MG/ML
4 INJECTION INTRAMUSCULAR; INTRAVENOUS ONCE AS NEEDED
Status: DISCONTINUED | OUTPATIENT
Start: 2020-02-11 | End: 2020-02-11 | Stop reason: HOSPADM

## 2020-02-11 RX ORDER — PROPOFOL 10 MG/ML
INJECTION, EMULSION INTRAVENOUS AS NEEDED
Status: DISCONTINUED | OUTPATIENT
Start: 2020-02-11 | End: 2020-02-11 | Stop reason: SURG

## 2020-02-11 RX ORDER — HYDROMORPHONE HCL/PF 1 MG/ML
0.4 SYRINGE (ML) INJECTION
Status: DISCONTINUED | OUTPATIENT
Start: 2020-02-11 | End: 2020-02-11 | Stop reason: HOSPADM

## 2020-02-11 RX ORDER — ALBUTEROL SULFATE 2.5 MG/3ML
2.5 SOLUTION RESPIRATORY (INHALATION) ONCE AS NEEDED
Status: DISCONTINUED | OUTPATIENT
Start: 2020-02-11 | End: 2020-02-11 | Stop reason: HOSPADM

## 2020-02-11 RX ADMIN — FENTANYL CITRATE 25 MCG: 50 INJECTION INTRAMUSCULAR; INTRAVENOUS at 08:20

## 2020-02-11 RX ADMIN — FENTANYL CITRATE 50 MCG: 50 INJECTION INTRAMUSCULAR; INTRAVENOUS at 08:12

## 2020-02-11 RX ADMIN — PROPOFOL 30 MG: 10 INJECTION, EMULSION INTRAVENOUS at 09:26

## 2020-02-11 RX ADMIN — LABETALOL 20 MG/4 ML (5 MG/ML) INTRAVENOUS SYRINGE 2.5 MG: at 09:41

## 2020-02-11 RX ADMIN — FENTANYL CITRATE 25 MCG: 50 INJECTION INTRAMUSCULAR; INTRAVENOUS at 08:33

## 2020-02-11 RX ADMIN — CHLORHEXIDINE GLUCONATE 0.12% ORAL RINSE 15 ML: 1.2 LIQUID ORAL at 07:06

## 2020-02-11 RX ADMIN — SODIUM CHLORIDE: 0.9 INJECTION, SOLUTION INTRAVENOUS at 07:49

## 2020-02-11 RX ADMIN — PROPOFOL 30 MG: 10 INJECTION, EMULSION INTRAVENOUS at 08:12

## 2020-02-11 RX ADMIN — PROPOFOL 100 MCG/KG/MIN: 10 INJECTION, EMULSION INTRAVENOUS at 08:12

## 2020-02-11 RX ADMIN — CEFAZOLIN SODIUM 1000 MG: 1 SOLUTION INTRAVENOUS at 08:04

## 2020-02-11 RX ADMIN — LABETALOL 20 MG/4 ML (5 MG/ML) INTRAVENOUS SYRINGE 5 MG: at 09:54

## 2020-02-11 RX ADMIN — HEPARIN SODIUM 2000 UNITS: 1000 INJECTION, SOLUTION INTRAVENOUS; SUBCUTANEOUS at 08:59

## 2020-02-11 RX ADMIN — LABETALOL 20 MG/4 ML (5 MG/ML) INTRAVENOUS SYRINGE 2.5 MG: at 09:19

## 2020-02-11 NOTE — DISCHARGE INSTRUCTIONS
DISCHARGE INSTRUCTIONS  ARM SURGERY    Following discharge from the hospital, you may have some questions about your operation, your activities or your general condition  These instructions may answer some of your questions and help you adjust during the first few weeks following your operation  ACTIVITY:    Limit use of the operated arm to what is absolutely necessary for the first day after surgery  On the second day after surgery, you may start to increase use of your arm as tolerated  One week after surgery, you should start to exercise your hand on the side of the dialysis graft by squeezing a ball  This increases blood flow in your graft and arm so your graft will function better  DIET:   Resume your normal diet  Try to eat low fat and low cholesterol foods  INCISION:   Your surgeon may have chosen to use a type of adhesive glue to close your incision  There are stitches present under the skin, which will absorb on their own  The glue is used to cover the incision, assist in closure, and prevent contamination  This adhesive will darken and peel away on its own within one to two weeks  You may shower after your surgery if there is adhesive glue present, but do not scrub the incision  If you do not have this adhesive glue, you may include the operated area in a shower on the third day following surgery  It is normal to have some pain at the surgical site  You will receive a prescription for pain medicine at the time of discharge  It is normal to have some bruising, swelling or mild discoloration around the incision  If increasing redness or pain develops at the incision site or severe pain, numbness, or weakness occurs in the hand, call our office immediately  Numbness in the region of the incision may occur following the surgery  This normally resolves in six to twelve months  ARM SWELLING: Most patients have some noticeable arm swelling after surgery    This usually disappears within a few weeks  If swelling is present, elevate the arm whenever possible  RESTRICTIONS: Do not have blood draws, IVs, or blood pressures performed on the operated arm  PLEASE CALL THE OFFICE IF YOU HAVE ANY QUESTIONS  [phone] [facility] [phone] [facility] FREE [phone]  [address] [place] [address] , [address], [facility] [place], [facility]  [address], [address] [facility]  [address], [place], [address]  [address] [facility], [facility], [address] [facility] [address]    [address]st [address], [facility]FF[facility]ATE OF [facility], [address], [address] [facility], [address] [facility] [address], [facility], [facility] 97   [address] [facility], [address] [facility], [facility] [place], [address]  [address] [facility], [address] [facility], [facility],[address], [address]

## 2020-02-11 NOTE — ANESTHESIA POSTPROCEDURE EVALUATION
Post-Op Assessment Note    CV Status:  Stable  Pain Score: 0    Pain management: adequate     Mental Status:  Arousable   Hydration Status:  Stable   PONV Controlled:  None   Airway Patency:  Patent   Post Op Vitals Reviewed: Yes      Staff: CRNA           BP  172/75   Temp   98 3   Pulse  74   Resp   24   SpO2   98%

## 2020-02-11 NOTE — INTERVAL H&P NOTE
H&P reviewed  After examining the patient I find no changes in the patients condition since the H&P had been written      Plan:  Creation of right upper arm AV fistula    Vitals:    02/11/20 0650   BP: (!) 191/90   Pulse: 74   Resp: 18   Temp: 99 3 °F (37 4 °C)   SpO2: 96%

## 2020-02-11 NOTE — OP NOTE
OPERATIVE REPORT  PATIENT NAME: Anthony Reed    :  1970  MRN: [id number]  Pt Location: [facility] OR ROOM 02    SURGERY DATE: 2020    Surgeon(s) and Role:     * Alex Victor MD - Primary     * Bernard Bhat PA-C - Assisting    Preop Diagnosis:  Chronic kidney disease, unspecified [N18 9]    Post-Op Diagnosis Codes:     * Chronic kidney disease, unspecified [N18 9]    Procedure(s) (LRB):  CREATION FISTULA ARTERIOVENOUS (AV) right upper extremity (Right)    Specimen(s):  * No specimens in log *    Estimated Blood Loss:   Minimal    Drains:  * No LDAs found *    Anesthesia Type:   IV Sedation with Anesthesia    Operative Indications:  Chronic kidney disease, unspecified [N18 9]      Operative Findings:  See op report    Complications:   None    Procedure and Technique:  The patient Marquita Finn was identified in the operating room after adequate IV sedation/laryngeal mask anesthesia was obtained the right arm was prepped and draped using chlorpropamide prep and sterile drape Infiltration with 1% Xylocaine incision was made over the cephalic vein which showed evidence of scarring and multiple phlebotomy events, sharp dissection down through the skin and subcutaneous tissue the vein was mobilized for several centimeters  A non-crushing vascular clamp was placed proximally and the vein dilated nicely with a heparin Papaverin solution  Deepening our incision the deep fascia was incised the distal brachial artery was identified and vessel loops were placed proximally and distally  An arteriotomy was made the artery was locally heparinized, the vein was cut to size and anastomosed  to the artery and an end to side fashion using a running 6-0 prolene  Prior to completion the vein was irrigated with heparin/papaverine solution, the artery was dilated with a 2 mm coronary dilator and the anastomosis was completed an excellent thrill to be felt immediately into the outflow tract   Handheld Doppler confirmed excellent flow     The wound was irrigated with copious amounts of antibiotic solution, the wound was closed in 2 layers using running 3-0 Monocryl at the subcutaneous level and skin clips  Sterile dressing was place and he was taken to the recovery room in stable condition    There was a mild thrill with excellent Doppler sounds throughout the vein with radial artery Doppler sounds at the conclusion of the procedure     I was present for the entire procedure, I was present for all critical portions of the procedure, A qualified resident physician was not available and A physician assistant was required during the procedure for retraction tissue handling,dissection and suturing    Patient Disposition:  PACU  and hemodynamically stable     Vascular Quality Initiative - Hemodialysis Access Placement    Previous Access: forearm fistula (none )    Preop ARTERIAL evaluation and/or treatment: duplex    Preop VENOUS evaluation and/or treatment: ultrasound mapping    Status: Outpatient    Anesthesia: Local    Side:right    Access Type: AVF     SIGNATURE: Ryley Mejia MD  DATE: February 11, 2020  TIME: 10:02 AM

## 2020-02-11 NOTE — ANESTHESIA PREPROCEDURE EVALUATION
Review of Systems/Medical History  Patient summary reviewed  Chart reviewed  No history of anesthetic complications     Cardiovascular  EKG reviewed, Hyperlipidemia, Hypertension , Past MI , CAD ,   Comment: Normal sinus rhythm  Possible Left atrial enlargement  Borderline ECG  When compared with ECG of 20-OCT-2019 02:54,  Criteria for Inferior infarct are no longer Present  T wave inversion no longer evident in Inferior leads  T wave amplitude has increased in Anterior leads  Nonspecific T wave abnormality now evident in Lateral leads  Confirmed by Arcadio Flores ([id number]) on 12/2/2019 7:54:59 AM      LEFT VENTRICLE: Size was normal  Systolic function was normal  Ejection fraction was estimated to be 60 %  There were no regional wall motion abnormalities  Wall thickness was mildly to moderately increased  DOPPLER: Features were  consistent with a pseudonormal left ventricular filling pattern, with concomitant abnormal relaxation and increased filling pressure (grade 2 diastolic dysfunction)      RIGHT VENTRICLE: The size was normal  Systolic function was normal  Wall thickness was normal      LEFT ATRIUM: The atrium was mildly dilated      RIGHT ATRIUM: Size was normal      MITRAL VALVE: Valve structure was normal  There was normal leaflet separation  DOPPLER: The transmitral velocity was within the normal range  There was no evidence for stenosis  There was mild regurgitation      AORTIC VALVE: The valve was trileaflet  Leaflets exhibited mildly to moderately increased thickness, normal cuspal separation, and sclerosis  DOPPLER: Transaortic velocity was within the normal range  There was no evidence for stenosis  There was no regurgitation      TRICUSPID VALVE: The valve structure was normal  There was normal leaflet separation  DOPPLER: The transtricuspid velocity was within the normal range  There was no evidence for stenosis  There was mild regurgitation   Estimated peak PA  pressure was 44 mmHg      PULMONIC VALVE: Leaflets exhibited normal thickness, no calcification, and normal cuspal separation  DOPPLER: The transpulmonic velocity was within the normal range  There was mild regurgitation      PERICARDIUM: There was no pericardial effusion  The pericardium was normal in appearance      AORTA: The root exhibited normal size      SYSTEMIC VEINS: IVC: The inferior vena cava was not well visualized  , Pulmonary hypertension Pulmonary  Pneumonia,        GI/Hepatic       Chronic kidney disease , Kidney transplant,        Endo/Other  Diabetes poorly controlled type 1 ,      GYN       Hematology  Anemia ,     Musculoskeletal    Comment: Hx of right BKA       Neurology   Psychology       psh  NEPHRECTOMY TRANSPLANTED ORGAN GLUTAMIC ACID DECARBOXYLASE (HISTORICAL)   EYE SURGERY TOE AMPUTATION   RI OPEN TREATMENT FRACTURE DISTAL TIBIA FIBULA CLOSED REDUCTION ANKLE   ANKLE FRACTURE SURGERY ANKLE HARDWARE REMOVAL   ANKLE HARDWARE REMOVAL FRACTURE SURGERY   IR PICC LINE IR VENOUS LINE REMOVAL   ESOPHAGOGASTRODUODENOSCOPY LEG AMPUTATION   IR [address] PLACEMENT CORONARY ANGIOPLASTY WITH STENT PLACEMENT   IR [address] EXCHANGE AMPUTATION         Physical Exam    Airway    Mallampati score: II  TM Distance: >3 FB  Neck ROM: full     Dental   No notable dental hx     Cardiovascular  Cardiovascular exam normal    Pulmonary  Pulmonary exam normal Breath sounds clear to auscultation,     Other Findings        Anesthesia Plan  ASA Score- 3     Anesthesia Type- IV sedation with anesthesia with ASA Monitors  Additional Monitors:   Airway Plan:         Plan Factors- Patient instructed to abstain from smoking on day of procedure       Induction- intravenous  Postoperative Plan- Plan for postoperative opioid use  Informed Consent- Anesthetic plan and risks discussed with patient  I personally reviewed this patient with the CRNA  Discussed and agreed on the Anesthesia Plan with the CRNA             Lab Results   Component Value Date WBC 7 27 12/02/2019    HGB 10 0 (L) 12/02/2019    HCT 31 3 (L) 12/02/2019    MCV 99 (H) 12/02/2019     12/02/2019     Lab Results   Component Value Date    GLUCOSE 460 (H) 02/14/2018    CALCIUM 8 5 12/02/2019     11/06/2015    K 4 7 12/02/2019    CO2 24 12/02/2019    CL 99 (L) 12/02/2019    BUN 60 (H) 12/02/2019    CREATININE 4 30 (H) 12/02/2019     Lab Results   Component Value Date    INR 1 09 10/20/2019    INR 0 93 10/19/2019    INR 0 99 10/11/2019    PROTIME 13 7 10/20/2019    PROTIME 12 5 10/19/2019    PROTIME 13 1 10/11/2019     Lab Results   Component Value Date    PTT 30 10/20/2019     Type and Screen:  O        Discussed with pt the benefits/alternatives and risks or General Anesthesia including breathing tube remaining in place if not strong enough, PONV, damage to lips and teeth, sore throat, eye injury or blindness    I, Dr Macdonald, the attending physician, have personally seen and evaluated the patient prior to anesthetic care  I have reviewed the pre-anesthetic record, and other medical records if appropriate to the anesthetic care  If a CRNA is involved in the case, I have reviewed the CRNA assessment, if present, and agree  The patient is in a suitable condition to proceed with my formulated anesthetic plan

## 2020-02-14 ENCOUNTER — TELEPHONE (OUTPATIENT)
Dept: NEUROLOGY | Facility: CLINIC | Age: 50
End: 2020-02-14

## 2020-02-14 DIAGNOSIS — Z89.611 HX OF AKA (ABOVE KNEE AMPUTATION), RIGHT (HCC): Primary | ICD-10-CM

## 2020-02-14 NOTE — TELEPHONE ENCOUNTER
pt called asking if he can get a new order to revamp his prosthetic   he  states that it is too heavy and socket is not fitting right   he states that he is switching companies  he will be using [facility]      can fax  [phone]  please advise  [phone] to leave a detailed message

## 2020-02-14 NOTE — TELEPHONE ENCOUNTER
Yes no problem  Order placed under Prosthetic - please fax  If they need a note, he will need to come in to the office as I saw him 10 months ago

## 2020-02-17 NOTE — TELEPHONE ENCOUNTER
Torie with [employer] [facility] asking for last office note  Faxed to [phone]

## 2020-02-27 ENCOUNTER — HOSPITAL ENCOUNTER (EMERGENCY)
Facility: HOSPITAL | Age: 50
Discharge: HOME | End: 2020-02-28
Attending: EMERGENCY MEDICINE
Payer: COMMERCIAL

## 2020-02-27 VITALS
SYSTOLIC BLOOD PRESSURE: 186 MMHG | OXYGEN SATURATION: 95 % | DIASTOLIC BLOOD PRESSURE: 88 MMHG | BODY MASS INDEX: 21.04 KG/M2 | HEART RATE: 63 BPM | HEIGHT: 64 IN | WEIGHT: 123.24 LBS | RESPIRATION RATE: 22 BRPM | TEMPERATURE: 97.2 F

## 2020-02-27 DIAGNOSIS — T38.3X5A HYPOGLYCEMIA DUE TO INSULIN: Primary | ICD-10-CM

## 2020-02-27 DIAGNOSIS — E16.0 HYPOGLYCEMIA DUE TO INSULIN: Primary | ICD-10-CM

## 2020-02-27 LAB
GLUCOSE SERPL-MCNC: 107 MG/DL (ref 65–140)
GLUCOSE SERPL-MCNC: 45 MG/DL (ref 65–140)
GLUCOSE SERPL-MCNC: 56 MG/DL (ref 65–140)

## 2020-02-27 PROCEDURE — 82948 REAGENT STRIP/BLOOD GLUCOSE: CPT

## 2020-02-27 PROCEDURE — 99285 EMERGENCY DEPT VISIT HI MDM: CPT | Performed by: EMERGENCY MEDICINE

## 2020-02-27 PROCEDURE — 93005 ELECTROCARDIOGRAM TRACING: CPT

## 2020-02-27 PROCEDURE — 99284 EMERGENCY DEPT VISIT MOD MDM: CPT

## 2020-02-27 RX ORDER — DEXTROSE 50 % IN WATER 50 %
1 SYRINGE (ML) INTRAVENOUS ONCE
Status: COMPLETED | OUTPATIENT
Start: 2020-02-27 | End: 2020-02-27

## 2020-02-27 RX ORDER — ONDANSETRON 2 MG/ML
4 INJECTION INTRAMUSCULAR; INTRAVENOUS ONCE
Status: DISCONTINUED | OUTPATIENT
Start: 2020-02-27 | End: 2020-02-28 | Stop reason: HOSPADM

## 2020-02-28 ENCOUNTER — OFFICE VISIT (OUTPATIENT)
Dept: VASCULAR SURGERY | Facility: CLINIC | Age: 50
End: 2020-02-28

## 2020-02-28 VITALS
SYSTOLIC BLOOD PRESSURE: 182 MMHG | HEIGHT: 64 IN | BODY MASS INDEX: 21.15 KG/M2 | DIASTOLIC BLOOD PRESSURE: 78 MMHG | HEART RATE: 64 BPM

## 2020-02-28 DIAGNOSIS — N18.6 ESRD (END STAGE RENAL DISEASE) ON DIALYSIS (HCC): Primary | ICD-10-CM

## 2020-02-28 DIAGNOSIS — Z98.890 POSTOPERATIVE STATE: ICD-10-CM

## 2020-02-28 DIAGNOSIS — Z99.2 ESRD (END STAGE RENAL DISEASE) ON DIALYSIS (HCC): Primary | ICD-10-CM

## 2020-02-28 LAB
ATRIAL RATE: 62 BPM
GLUCOSE SERPL-MCNC: 174 MG/DL (ref 65–140)
P AXIS: 52 DEGREES
PR INTERVAL: 144 MS
QRS AXIS: 2 DEGREES
QRSD INTERVAL: 92 MS
QT INTERVAL: 470 MS
QTC INTERVAL: 477 MS
T WAVE AXIS: 79 DEGREES
VENTRICULAR RATE: 62 BPM

## 2020-02-28 PROCEDURE — 93010 ELECTROCARDIOGRAM REPORT: CPT | Performed by: INTERNAL MEDICINE

## 2020-02-28 PROCEDURE — 3066F NEPHROPATHY DOC TX: CPT | Performed by: PHYSICIAN ASSISTANT

## 2020-02-28 PROCEDURE — 82948 REAGENT STRIP/BLOOD GLUCOSE: CPT

## 2020-02-28 PROCEDURE — 3008F BODY MASS INDEX DOCD: CPT | Performed by: PHYSICIAN ASSISTANT

## 2020-02-28 PROCEDURE — 99024 POSTOP FOLLOW-UP VISIT: CPT | Performed by: PHYSICIAN ASSISTANT

## 2020-02-28 NOTE — ED PROVIDER NOTES
History  Chief Complaint   Patient presents with    Hypoglycemia - no symptoms     pt was found unresponsive by wife, pt was hypoglycemic, wife gave 2g total of glucagon and 250mL of d10  pt took only 8units of humalog insulin, ate dinner      25-year-old male with history of type 1 diabetes on hemodialysis status post nephrectomy of transplanted kidney  He underwent a recent new right forearm fistula in the right axilla in the right antecubital region but he is not using it he is receiving his hemodialysis Tuesday Thursday Saturday through his right chest wall hemodialysis catheter with Dr Filiberto Vigil; hemodialysis has been going well  He is on his usual regimen of insulin with limb of ear 7 units in the morning and the evening he did take 8 units of Humalog prior to dinner he states he ate a good amount at dinner  But this evening his blood sugars dropped to 35 and then to 20s his wife did give 2 g of oral glucose and in route he received 250 mL of D10  In route his sugars improved to a 6 upon arrival here there 107  He denies any fever chills cough or upper respiratory complaints he feels fine he has no symptomatic complaints currently he was diaphoretic and does feel slightly cold he denies any chest pain hemodialysis has been going well he has no abdominal pain no nausea vomiting no skin issues he does have an appointment tomorrow with in [place] in to reassess his new right forearm AV fistula  Is denying any lightheadedness headaches no new numbness or tingling  No rashes skin issues or cellulitis          Prior to Admission Medications   Prescriptions Last Dose Informant Patient Reported? Taking?    Cholecalciferol 25 MCG (1000 UT) tablet  Self No No   Sig: Take 1 tablet (1,000 Units total) by mouth daily   Patient taking differently: Take 1,000 Units by mouth every morning    Lancets (ONETOUCH DELICA PLUS [id number]) MISC  Self Yes No   NIFEdipine (PROCARDIA XL) 90 mg 24 hr tablet  Self No No Sig: Take 1 tablet (90 mg total) by mouth daily   Patient taking differently: Take 90 mg by mouth every morning    ONE TOUCH ULTRA TEST test strip  Self No No   Sig: CHECK BLOOD SUGAR 5 TO 6 TIMES DAILY   albuterol (2 5 mg/3 mL) 0 083 % nebulizer solution  Self Yes No   Sig: Inhale 2 5 mg every 4 (four) hours as needed    aspirin 81 mg chewable tablet  Self Yes No   Sig: Chew 81 mg every morning    atorvastatin (LIPITOR) 80 mg tablet  Self Yes No   Sig: Take 80 mg by mouth every morning    azaTHIOprine (IMURAN) 50 mg tablet  Self Yes No   Sig: Take 50 mg by mouth every morning    calcium carbonate (TUMS) 500 mg chewable tablet   Yes No   Sig: Chew 500 mg 2 (two) times a day    carvedilol (COREG) 6 25 mg tablet  Self No No   Sig: Take 1 tablet (6 25 mg total) by mouth 2 (two) times a day with meals   glucagon (GLUCAGON EMERGENCY) 1 MG injection  Self No No   Sig: Inject 1 mg under the skin once as needed for low blood sugar for up to 1 dose   hydrALAZINE (APRESOLINE) 50 mg tablet  Self No No   Sig: Take 1 tablet (50 mg total) by mouth every 8 (eight) hours   insulin detemir (LEVEMIR) 100 units/mL subcutaneous injection  Self Yes No   Sig: Inject 7 Units under the skin 2 (two) times a day   insulin lispro (HumaLOG) 100 units/mL injection  Self Yes No   Sig: Inject 10 Units under the skin as needed    metoprolol succinate (TOPROL-XL) 25 mg 24 hr tablet  Self No No   Sig: Take 2 tablets (50 mg total) by mouth daily   Patient taking differently: Take 50 mg by mouth every morning    pantoprazole (PROTONIX) 40 mg tablet  Self No No   Sig: Take 1 tablet (40 mg total) by mouth daily in the early morning   predniSONE 5 mg tablet   No No   Sig: Take 1 tablet (5 mg total) by mouth daily   Patient taking differently: Take 5 mg by mouth every morning    tacrolimus (PROGRAF) 1 mg capsule  Self Yes No   Sig: Take 3 mg by mouth every morning   tacrolimus (PROGRAF) 1 mg capsule  Self Yes No   Sig: Take 2 mg by mouth daily at bedtime      Facility-Administered Medications: None       Past Medical History:   Diagnosis Date    Bacteremia 12/21/2018    CAD (coronary artery disease)     s/p MARC to LCx, pLAD 2/2018    [facility] [address] [facility]     Chronic kidney disease     Diabetes mellitus type 1 ([facility] [address] [facility]     Dialysis patient [facility])     Access in right chest    GI bleed     Hyperlipidemia     Hypertension     Infection at site of external fixator pin ([facility] [address] [facility]     MI (myocardial infarction) ([facility] [address] [facility]     Pneumonia     Last Assessed [id number]    Renal failure     Renal transplant, status post 07/21/2007       Past Surgical History:   Procedure Laterality Date    AMPUTATION Right 02/2019    aboce knee    ANKLE FRACTURE SURGERY      ANKLE HARDWARE REMOVAL Right 7/31/2017    Procedure: REMOVAL HARDWARE ANKLE;  Surgeon: Royal Collins MD;  Location: [facility];  Service: Orthopedics    ANKLE HARDWARE REMOVAL Right 8/17/2017    Procedure: TIBIA FAILED HARDWARE REMOVAL;  Surgeon: Royal Collins MD;  [facility]: [facility];  Service: Orthopedics    CLOSED REDUCTION ANKLE Right 7/3/2017    Procedure: CLOSED REDUCTION DISTAL TIB-FIB AND CASTING VS;  Surgeon: Royal Collins MD;  [facility]: [facility];  Service: Orthopedics    CORONARY ANGIOPLASTY WITH STENT PLACEMENT  02/2018    CAD s/p MARC to LCx, pLAD    ESOPHAGOGASTRODUODENOSCOPY N/A 12/20/2018    Procedure: ESOPHAGOGASTRODUODENOSCOPY (EGD) in ICU; Surgeon: Nora Umanzor MD;  Location: [facility];   Service: Gastroenterology    EYE SURGERY      FRACTURE SURGERY      ORIF Rt Ankle    GLUTAMIC ACID DECARBOXYLASE (HISTORICAL)      IR [address] EXCHANGE  1/8/2020    IR [address] PLACEMENT  10/21/2019    IR PICC LINE  8/20/2018    IR VENOUS LINE REMOVAL  10/5/2018    LEG AMPUTATION Right 02/01/2019    Above the knee    NEPHRECTOMY TRANSPLANTED ORGAN      MT ANASTOMOSIS,AV,ANY SITE Right 2/11/2020    Procedure: CREATION FISTULA ARTERIOVENOUS (AV) right upper extremity;  Surgeon: Jose C Cassidy MD;  Location: [address] [place] [address] MAIN OR;  Service: Vascular    GA OPEN TREATMENT FRACTURE DISTAL TIBIA FIBULA Right 7/3/2017    Procedure: OPEN REDUCTION W/ INTERNAL FIXATION (ORIF); Surgeon: Jing Rojas MD;  Location: [facility];  Service: Orthopedics    TOE AMPUTATION Right 10/27/2016    Procedure: AMPUTATION TOE;  Surgeon: Author Wil DPM;  Location: [facility];  Service:        Family History   Problem Relation Age of Onset    Diabetes Brother     Coronary artery disease Mother     No Known Problems Father      I have reviewed and agree with the history as documented  E-Cigarette/Vaping    E-Cigarette Use Never User      E-Cigarette/Vaping Substances    Nicotine No     Flavoring No      Social History     Tobacco Use    Smoking status: Never Smoker    Smokeless tobacco: Never Used   Substance Use Topics    Alcohol use: Not Currently    Drug use: Never       Review of Systems   Constitutional: Positive for diaphoresis  Negative for activity change, appetite change, chills and fever  HENT: Negative for congestion, ear pain, rhinorrhea, sneezing and sore throat  Eyes: Negative for discharge  Respiratory: Negative for cough and shortness of breath  Cardiovascular: Negative for chest pain and leg swelling  Gastrointestinal: Negative for abdominal pain, blood in stool, diarrhea, nausea and vomiting  Endocrine: Negative for polyuria  Genitourinary: Negative for difficulty urinating and urgency  Musculoskeletal: Negative for back pain and myalgias  Skin: Negative for rash  Neurological: Negative for dizziness, weakness, light-headedness, numbness and headaches  Hematological: Negative for adenopathy  Psychiatric/Behavioral: Negative for confusion  All other systems reviewed and are negative  Physical Exam  Physical Exam   Constitutional: He is oriented to person, place, and time  He appears well-developed  No distress  HENT:   Head: Normocephalic and atraumatic     Right Ear: External ear normal    Left Ear: External ear normal    Nose: Nose normal    Mouth/Throat: Oropharynx is clear and moist    Eyes: Pupils are equal, round, and reactive to light  Conjunctivae and EOM are normal  Right eye exhibits no discharge  Left eye exhibits no discharge  No scleral icterus  Neck: Normal range of motion  Neck supple  Cardiovascular: Normal rate, regular rhythm, normal heart sounds and intact distal pulses  Pulmonary/Chest: Effort normal and breath sounds normal  No respiratory distress  Right chest wall HD catheter c/d/i   Abdominal: Soft  Bowel sounds are normal  He exhibits no distension and no mass  There is no tenderness  There is no rebound and no guarding  Back: no mildline or CVA tenderness   Musculoskeletal: Normal range of motion  He exhibits no edema, tenderness or deformity  RUE fistula (maturing) c/d/i with thrill and bruit  Dressing not removed  No evidence of cellulitis  Rt AKA   Lymphadenopathy:     He has no cervical adenopathy  Neurological: He is alert and oriented to person, place, and time  No cranial nerve deficit or sensory deficit  He exhibits normal muscle tone  Coordination normal    Gait steady   Skin: Skin is warm and dry  Capillary refill takes less than 2 seconds  He is not diaphoretic  Psychiatric: He has a normal mood and affect  Nursing note and vitals reviewed        Vital Signs  ED Triage Vitals   Temperature Pulse Respirations Blood Pressure SpO2   02/27/20 2057 02/27/20 2057 02/27/20 2057 02/27/20 2112 02/27/20 2057   (!) 97 2 °F (36 2 °C) 62 18 154/69 96 %      Temp Source Heart Rate Source Patient Position - Orthostatic VS BP Location FiO2 (%)   02/27/20 2057 02/27/20 2057 02/27/20 2057 02/27/20 2057 --   Temporal Monitor Sitting Left arm       Pain Score       02/27/20 2057       No Pain           Vitals:    02/27/20 2215 02/27/20 2230 02/27/20 2245 02/27/20 2300   BP:  (!) 172/82  (!) 186/88   Pulse: 60 61 63 63   Patient Position - Orthostatic VS:             Visual Acuity      ED Medications  Medications   dextrose (FOR EMS ONLY) 50 % IV solution 100 mL (0 mL Does not apply Given to EMS 2/27/20 2123)       Diagnostic Studies  Results Reviewed     Procedure Component Value Units Date/Time    Fingerstick Glucose (POCT) [[id number]]  (Abnormal) Collected:  02/28/20 0002    Lab Status:  Final result Updated:  02/28/20 0003     POC Glucose 174 mg/dl     Fingerstick Glucose (POCT) [[id number]]  (Abnormal) Collected:  02/27/20 2229    Lab Status:  Final result Updated:  02/27/20 2230     POC Glucose 56 mg/dl     Fingerstick Glucose (POCT) [[id number]]  (Abnormal) Collected:  02/27/20 2153    Lab Status:  Final result Updated:  02/27/20 2154     POC Glucose 45 mg/dl     Fingerstick Glucose (POCT) [[id number]]  (Normal) Collected:  02/27/20 2054    Lab Status:  Final result Updated:  02/27/20 2103     POC Glucose 107 mg/dl                  No orders to display              Procedures  ECG 12 Lead Documentation Only  Date/Time: 2/27/2020 9:09 PM  Performed by: Kai Tamayo MD  Authorized by: Kai Tamayo MD     Indications / Diagnosis:  Hypoglycemic episode  ECG reviewed by me, the ED Provider: yes    Patient location:  ED  Previous ECG:     Previous ECG:  Compared to current    Comparison ECG info:  12/1/2019    Similarity:  No change  Rate:     ECG rate:  62    ECG rate assessment: normal    Rhythm:     Rhythm: sinus rhythm    QRS:     QRS axis:  Normal  Comments:      No acute ischemic change             ED Course  ED Course as of Feb 28 0413   Thu Feb 27, 2020 2202 Blood sugar decreased to 45; trying soda  Will hold bedtime insulin      2234 Recheck blood sugar 56 if not trending up recommend d10 infusion and admission        2315 Patient nauseated from consuming so much soda will administer Zofran he will take his own supply of hydralazine because his blood pressure is creeping up we reviewed that if we were not able to get his sugar up within next Accu-Chek he needs to be admitted patient agreeable  Discussed in front of wife                                  MDM  Number of Diagnoses or Management Options  Hypoglycemia due to insulin:   Diagnosis management comments: Mdm:  28-year-old male with type 1 diabetes on dialysis there is no evidence of infection based on physical exam in a struggle features likely etiology of hypoglycemic episode is decreased p o  Intake at lunch she did eat dinner Accu-Chek here on arrival is 107 he will consume peanut butter and crackers recheck his glucose decrease his nighttime Levemir to half and he does snack pawn arrival home  We reviewed doing a more extensive workup including labs chest x-ray after careful consideration patient and wife using shared decision making decided to defer he has any recurrent episodes will return immediately family is comfortable with plan  Prior to discharge patients blood sugar 174 requested to go home will skip evening insulin dose        Disposition  Final diagnoses:   Hypoglycemia due to insulin     Time reflects when diagnosis was documented in both MDM as applicable and the Disposition within this note     Time User Action Codes Description Comment    2/27/2020  9:37 PM Caroline Do Add [E16 0,  T38 3X5A] Hypoglycemia due to insulin       ED Disposition     ED Disposition Condition Date/Time Comment    Discharge Stable u Feb 27, 2020  9:43 PM Alessandro Simon discharge to home/self care              Follow-up Information     Follow up With Specialties Details Why Contact Anna Moctezuma, DO Nephrology  As needed [address]  [phone]            Discharge Medication List as of 2/27/2020  9:43 PM      CONTINUE these medications which have NOT CHANGED    Details   albuterol (2 5 mg/3 mL) 0 083 % nebulizer solution Inhale 2 5 mg every 4 (four) hours as needed , Starting Fri 2/8/2019, Historical Med      aspirin 81 mg chewable tablet Chew 81 mg every morning , Historical Med      atorvastatin (LIPITOR) 80 mg tablet Take 80 mg by mouth every morning , Historical Med      azaTHIOprine (IMURAN) 50 mg tablet Take 50 mg by mouth every morning , Starting Thu 10/18/2018, Historical Med      calcium carbonate (TUMS) 500 mg chewable tablet Chew 500 mg 2 (two) times a day , Historical Med      carvedilol (COREG) 6 25 mg tablet Take 1 tablet (6 25 mg total) by mouth 2 (two) times a day with meals, Starting Tue 11/19/2019, Normal      Cholecalciferol 25 MCG (1000 UT) tablet Take 1 tablet (1,000 Units total) by mouth daily, Starting Tue 11/19/2019, Normal      glucagon (GLUCAGON EMERGENCY) 1 MG injection Inject 1 mg under the skin once as needed for low blood sugar for up to 1 dose, Starting Fri 10/18/2019, Normal      hydrALAZINE (APRESOLINE) 50 mg tablet Take 1 tablet (50 mg total) by mouth every 8 (eight) hours, Starting Thu 11/7/2019, Normal      insulin detemir (LEVEMIR) 100 units/mL subcutaneous injection Inject 7 Units under the skin 2 (two) times a day, Historical Med      insulin lispro (HumaLOG) 100 units/mL injection Inject 10 Units under the skin as needed , Historical Med      Lancets (ONETOUCH DELICA PLUS [id number]) MISC Starting Tue 11/19/2019, Historical Med      metoprolol succinate (TOPROL-XL) 25 mg 24 hr tablet Take 2 tablets (50 mg total) by mouth daily, Starting Mon 4/8/2019, Normal      NIFEdipine (PROCARDIA XL) 90 mg 24 hr tablet Take 1 tablet (90 mg total) by mouth daily, Starting Tue 11/19/2019, Normal      ONE TOUCH ULTRA TEST test strip CHECK BLOOD SUGAR 5 TO 6 TIMES DAILY, Normal      pantoprazole (PROTONIX) 40 mg tablet Take 1 tablet (40 mg total) by mouth daily in the early morning, Starting Tue 11/19/2019, Normal      predniSONE 5 mg tablet Take 1 tablet (5 mg total) by mouth daily, Starting Mon 2/3/2020, Normal      !! tacrolimus (PROGRAF) 1 mg capsule Take 3 mg by mouth every morning, Historical Med      !! tacrolimus (PROGRAF) 1 mg capsule Take 2 mg by mouth daily at bedtime, Historical Med       !! - Potential duplicate medications found  Please discuss with provider  No discharge procedures on file      PDMP Review     None          ED Provider  Electronically Signed by           Amberly Ji MD  02/27/20 [id number]       Amberly Ji MD  02/28/20 [id number]

## 2020-02-28 NOTE — PROGRESS NOTES
Assessment/Plan:    ESRD (end stage renal disease) on dialysis [facility])  58-year-old male nonsmoker with HTN, a HLD, type 1 DM, CAD, s/p PCI/stent, s/p R AKA 2/1/19 and CKD w/renal transplant (LRRT) x 2 with progression to ESRD on HD TTS since October '19 via R IJV PermCath who is now s/p creation R brachiocephalic AVF by Dr Della Vance on 2/11/2020   -+thrjoanne lee RUE AVF  -doing well, incision healing well  Staples removed in the office today without incident or dehiscence  -continue incisional care as outlined  -continue HD via right PermCath per Nephrology  -initiate right hand exercises to promote AVF maturity  -HD duplex in 3 weeks to assess AVF maturity  -return to office in 6 weeks with Dr Della Vance for assessment of AVF maturity  -instructed to contact the office with new symptoms, concerns or changes in incision       Diagnoses and all orders for this visit:    ESRD (end stage renal disease) on dialysis ([facility] [address] )  -     VAS hemodialysis access duplex right upper limb avf; Future    Postoperative state  -     VAS hemodialysis access duplex right upper limb avf; Future          Subjective:      Patient ID: Sean Rodriguez is a 47 y o  male  Pt is PO RUE AVF done on 2/11/20 by Dr Della Vance  Pt currently goes to [facility] [place]  Pt denies having any fever, chills, numbness, cramping during dialysis in the RUE  The incision site is dry and intact  The staples are intact  There is some dried blood around the outside of the incision Pt currently has a chest port on the R side they use for dialysis  Pt currently takes ASA 81mg, Atorvastatin  58-year-old male nonsmoker with HTN, HLD, type 1 DM, CAD, s/p PCI/stent, s/p R AKA 2/1/19 and CKD w/renal transplant (LRRT) x 2 with last transplant '01 with progression to ESRD on HD TTS since October '19 via R IJV PermCath who is now s/p creation R brachiocephalic AVF by Dr Della Vance on 2/11/2020  Patient returns the office today for postoperative follow-up    He is doing well and asymptomatic without right upper extremity paresthesias, numbness, pain, pallor, tissue loss or claudication  Right antecubital incision healed well with staples intact without dehiscence, erythema, drainage, hematoma or induration  Right upper extremity well perfused with readily palpable radial and ulnar pulses  +thrill, bruit RUE AVF  Patient in the emergency room last p m  secondary to unresponsiveness related to hypoglycemia, now resolved  The following portions of the patient's history were reviewed and updated as appropriate: allergies, current medications, past family history, past medical history, past social history, past surgical history and problem list     Review of Systems   Constitutional: Negative  Negative for chills and fever  HENT: Negative  Eyes: Negative  Respiratory: Negative  Negative for chest tightness and shortness of breath  Cardiovascular: Negative for chest pain  Gastrointestinal: Negative  Endocrine: Negative  Genitourinary: Negative  Musculoskeletal: Negative  Skin: Positive for wound (R AVF incision site )  Allergic/Immunologic: Negative  Neurological: Negative  Negative for weakness, light-headedness and numbness  Hematological: Negative  Psychiatric/Behavioral: Negative  I have personally reviewed the ROS entered by MA and agree as documented  Objective:      BP (!) 182/78 (BP Location: Right arm, Patient Position: Sitting)   Pulse 64   Ht 5' 4" (1 626 m)   BMI 21 15 kg/m²          Physical Exam   Constitutional: He is oriented to person, place, and time  He appears well-developed and well-nourished  No distress  Neck: No JVD present  Cardiovascular: Normal rate and regular rhythm  Exam reveals no friction rub  No murmur heard  Pulses:       Radial pulses are 2+ on the right side     Right upper extremity warm, pink, motor and sensory intact and well perfused with readily palpable 2+ radial and ulnar pulse   Right antecubital incision healed well with staples intact and wound edges well approximated  No erythema, induration, drainage, hematoma, hemorrhage or dehiscence  Pulmonary/Chest: Effort normal and breath sounds normal  No respiratory distress  He has no wheezes  He has no rales  Right anterior chest wall PermCath site clear without erythema, drainage or hemorrhage  Musculoskeletal: Normal range of motion  He exhibits no edema  Neurological: He is alert and oriented to person, place, and time  Skin: Skin is warm and dry  Capillary refill takes less than 2 seconds  He is not diaphoretic  No erythema  Nursing note and vitals reviewed

## 2020-02-28 NOTE — DISCHARGE INSTRUCTIONS
Cut Levemir to 1/2 dose this evening  Snack this evening    Return with any fever, skin issues he signs of infection chest pain shortness of breath recurrent hypoglycemia or any concerns  Keep appointment with your surgeon tomorrow

## 2020-02-28 NOTE — PATIENT INSTRUCTIONS
DISCHARGE INSTRUCTIONS  DIALYSIS FISTULA SURGERY    Following discharge from the hospital, you may have some questions about your operation, your activities or your general condition  These instructions may answer some of your questions and help you adjust during the first few weeks following your operation  ACTIVITY:  Limit use of the operated arm to what is absolutely necessary for the first day after surgery  On the second day after surgery, you may start to increase use of your arm as tolerated  One week after surgery, you should start to exercise your hand on the side of the graft by squeezing a ball  This increases blood flow in your graft and arm so your graft will function better  DIET:   Resume your normal diet  Try to eat low fat and low cholesterol foods  DRESSING:   The dressing may be removed on the third day following surgery  INCISION:   You may include the operated area in a shower on the fourth day following surgery  It is normal to have some pain at the surgical site  You will receive a prescription for pain medicine at the time of discharge  There will also be some swelling and bruising around the surgical site  If increasing redness or pain develops at the incision or severe pain, numbness or weakness occurs in the hand, call our office immediately  Numbness in the region of the incision may occur following the surgery  This normally resolves in six to twelve months  ARM SWELLING:    Most patients have some noticeable arm swelling after surgery  This usually disappears within a few weeks  If swelling is present, elevate the arm whenever possible  RESTRICTIONS:   Do not have blood draws, IVs, or blood pressures performed on the operated arm  FISTULA USE:    Your fistula will not be used for six to 12 weeks      PLEASE CALL THE OFFICE IF YOU HAVE ANY QUESTIONS  [phone] Abdifatah Millan [phone]  TOLL FREE [phone]  [address] [place] [address], [place], [address]  [address], [address], [address], [facility], [address]  [address] [facility] [address], [place] K[place]er, [address] [facility] [address]  [address], [address]st Floor, [address]  [address], [address] [facility], [address] [facility] [address]   [address] [facility], [address] [facility], [address]  [facility], [address] [facility], [facility], Hebert Perez [address]    -your incision is healing well   -continue hemodialysis via PermCath at the present time   -continue incisional care to upper extremity  Clean incision daily with soap and water  No lotions, ointments or creams to incision  No soaking or submerging incision x4 weeks  -initiate hand exercises to help promote maturity fistula  -we will schedule you for a upper extremity ultrasound in 3 weeks to evaluate AV fistula maturity to help determine when the fistula can be used  -return to office in 6 weeks with Dr Inge Peter for reassessment and determination if fistula can be used for hemodialysis  -please contact the office in the interim with any questions, concerns or changes in your incision

## 2020-02-28 NOTE — LETTER
February 28, 2020     Nicolasamatt Wilburn DO  [address] [facility] [address]    Patient: Suzanna Venegas   YOB: 1970   Date of Visit: 2/28/2020       Dear Dr Sandro Overton: Thank you for referring [facility] to me for evaluation  Below are my notes for this consultation  If you have questions, please do not hesitate to call me  I look forward to following your patient along with you  Sincerely,        Florentin Cantrell PA-C        CC: DO Florentin Buenrostro, [place]  2/28/2020 10:19 AM  Sign at close encounter  Assessment/Plan:    ESRD (end stage renal disease) on dialysis [facility])  58-year-old male nonsmoker with HTN, a HLD, type 1 DM, CAD, s/p PCI/stent, s/p R AKA 2/1/19 and CKD w/renal transplant (LRRT) x 2 with progression to ESRD on HD TTS since October '19 via R IJV PermCath who is now s/p creation R brachiocephalic AVF by Dr Britany Tanner on 2/11/2020   -+thrill, bruit RUE AVF  -doing well, incision healing well  Staples removed in the office today without incident or dehiscence  -continue incisional care as outlined  -continue HD via right PermCath per Nephrology  -initiate right hand exercises to promote AVF maturity  -HD duplex in 3 weeks to assess AVF maturity  -return to office in 6 weeks with Dr Britany Tanner for assessment of AVF maturity  -instructed to contact the office with new symptoms, concerns or changes in incision       Diagnoses and all orders for this visit:    ESRD (end stage renal disease) on dialysis ([address] )  -     VAS hemodialysis access duplex right upper limb avf; Future    Postoperative state  -     VAS hemodialysis access duplex right upper limb avf; Future          Subjective:      Patient ID: Oris Cornea is a 47 y o  male  Pt is PO RUE AVF done on 2/11/20 by Dr Britany Tanner  Pt currently goes to [facility] [place]  Pt denies having any fever, chills, numbness, cramping during dialysis in the RUE  The incision site is dry and intact   The staples are intact  There is some dried blood around the outside of the incision Pt currently has a chest port on the R side they use for dialysis  Pt currently takes ASA 81mg, Atorvastatin  54-year-old male nonsmoker with HTN, HLD, type 1 DM, CAD, s/p PCI/stent, s/p R AKA 2/1/19 and CKD w/renal transplant (LRRT) x 2 with last transplant '01 with progression to ESRD on HD TTS since October '19 via R IJV PermCath who is now s/p creation R brachiocephalic AVF by Dr Jay Fishman on 2/11/2020  Patient returns the office today for postoperative follow-up  He is doing well and asymptomatic without right upper extremity paresthesias, numbness, pain, pallor, tissue loss or claudication  Right antecubital incision healed well with staples intact without dehiscence, erythema, drainage, hematoma or induration  Right upper extremity well perfused with readily palpable radial and ulnar pulses  +thrill, bruit RUE AVF  Patient in the emergency room last p m  secondary to unresponsiveness related to hypoglycemia, now resolved  The following portions of the patient's history were reviewed and updated as appropriate: allergies, current medications, past family history, past medical history, past social history, past surgical history and problem list     Review of Systems   Constitutional: Negative  Negative for chills and fever  HENT: Negative  Eyes: Negative  Respiratory: Negative  Negative for chest tightness and shortness of breath  Cardiovascular: Negative for chest pain  Gastrointestinal: Negative  Endocrine: Negative  Genitourinary: Negative  Musculoskeletal: Negative  Skin: Positive for wound (R AVF incision site )  Allergic/Immunologic: Negative  Neurological: Negative  Negative for weakness, light-headedness and numbness  Hematological: Negative  Psychiatric/Behavioral: Negative          I have personally reviewed the ROS entered by MA and agree as documented  Objective:      BP (!) 182/78 (BP Location: Right arm, Patient Position: Sitting)   Pulse 64   Ht 5' 4" (1 626 m)   BMI 21 15 kg/m²           Physical Exam   Constitutional: He is oriented to person, place, and time  He appears well-developed and well-nourished  No distress  Neck: No JVD present  Cardiovascular: Normal rate and regular rhythm  Exam reveals no friction rub  No murmur heard  Pulses:       Radial pulses are 2+ on the right side  Right upper extremity warm, pink, motor and sensory intact and well perfused with readily palpable 2+ radial and ulnar pulse  Right antecubital incision healed well with staples intact and wound edges well approximated  No erythema, induration, drainage, hematoma, hemorrhage or dehiscence  Pulmonary/Chest: Effort normal and breath sounds normal  No respiratory distress  He has no wheezes  He has no rales  Right anterior chest wall PermCath site clear without erythema, drainage or hemorrhage  Musculoskeletal: Normal range of motion  He exhibits no edema  Neurological: He is alert and oriented to person, place, and time  Skin: Skin is warm and dry  Capillary refill takes less than 2 seconds  He is not diaphoretic  No erythema  Nursing note and vitals reviewed

## 2020-02-28 NOTE — ASSESSMENT & PLAN NOTE
58-year-old male nonsmoker with HTN, a HLD, type 1 DM, CAD, s/p PCI/stent, s/p R AKA 2/1/19 and CKD w/renal transplant (LRRT) x 2 with progression to ESRD on HD TTS since October '19 via R IJV PermCath who is now s/p creation R brachiocephalic AVF by Dr Ragini Brown on 2/11/2020   -+thrill, bruit RUE AVF  -doing well, incision healing well  Staples removed in the office today without incident or dehiscence    -continue incisional care as outlined  -continue HD via right PermCath per Nephrology  -initiate right hand exercises to promote AVF maturity  -HD duplex in 3 weeks to assess AVF maturity  -return to office in 6 weeks with Dr Ragini Brown for assessment of AVF maturity  -instructed to contact the office with new symptoms, concerns or changes in incision

## 2020-03-03 ENCOUNTER — TELEPHONE (OUTPATIENT)
Dept: VASCULAR SURGERY | Facility: CLINIC | Age: 50
End: 2020-03-03

## 2020-03-03 NOTE — TELEPHONE ENCOUNTER
Spoke with patient testing scheduled 3/20 at 8am in the [address] [place] [address]  This was the first avail appointment that worked for the patient     From: Ruven Leventhal [mailto:[email or web address]   Sent: Tuesday, March 3, 2020 10:21 AM  To: Ean Salamanca  Subject: FW: Katerina  pt AVF assessment    Hi [place],    Can you please move this pts Doppler up by 4 weeks - please see Dr Alvaro Aguilar email below  Lorena Clarence - [birth year]  Thank you,  Darya    From: Leonora Daugherty [mailto:[email or web address]]   Sent: Monday, March 2, 2020 4:56 PM  To: Ruven Leventhal <[email or web address]  Subject: Re: Katerina  pt AVF assessment    WARNING: This email originated outside of [facility]  Even if this looks like a [employer] email, it is not  DO NOT provide your username, password, or any other personal information in response to this or any other email  [employer] WILL NEVER ask you for your username or password via email  DO NOT CLICK links or attachments unless you are positive the content is safe  IF IN DOUBT about the safety of this message, use the Report Phishing button  We need to move Doppler up 3-4 weeks     Danny Jimenes MD, [address] Director   [facility]    From: Ruven Leventhal <[email or web address]  Sent: Monday, March 2, 2020 10:22:32 AM  To: Leonora Daugherty <[email or web address]  Subject: RE: Katerina  pt AVF assessment      Hi Rolando Vance,     Just a quick follow-up on Faria  He had his appt with Lu on 2/28 but the Doppler isnt scheduled until nearly 10  weeks post op and appt with you is 4 days after that  Im worried that if there is an AA  stenosis present, he will thrombose during this long wait  Please [email or web address]     From: Leonora Daugherty [mailto:[email or web address]]   Sent: Thursday, February 20, 2020 2:59 PM  To: Ruven Leventhal <[email or web address]  Subject: Re: Katerina  pt AVF assessment     WARNING: This email originated outside of [address]   Even if this looks like a [employer] email, it is not  DO NOT provide your username, password, or any other personal information in response to this or any other email  [employer] WILL NEVER ask you for your username or password via email  DO NOT CLICK links or attachments unless you are positive the content is safe  IF IN DOUBT about the safety of this message, use the Report Phishing button  Great thanks      Donya Lewis MD, [address] Director   [facility]    From: Anival Chu <[email or web address]  Sent: Thursday, February 20, 2020 2:45:25 PM  To: Amalia Daley <[email or web address] | [email or web address]  Subject: Jose Aneta pt AVF assessment      WARNING! This email came from outside the [address] [place] [address] Ave  DO NOT click on any links or open attachments from this email unless you know the sender & the content are safe  Jensen Granger,     I was in [place] today and assessed Berto Jackman AVF  He has a single cephalic vein developing and it looks/feels nice and straight  But at the [place] [address], I feel more of a pulse and hear more of a beat than a bruit or thrill  I think your idea of getting a duplex early is a great idea - he has an appt with Lu on 2/28  Maybe get the duplex at 3 or 4 weeks post op and a fistulogram at 5 weeks to catch it before it occludes  It has the potential to be a very nice access  Also - I stopped by the [facility] office this afternoon and gave Arianna Richards a packet of updated access related stats/case study info for you to share with the Group       Thanks,  Precious Huertas, [facility]  [place]   [place] [address]  [phone] (cell)  [phone] (efax)

## 2020-03-20 ENCOUNTER — HOSPITAL ENCOUNTER (OUTPATIENT)
Dept: NON INVASIVE DIAGNOSTICS | Facility: HOSPITAL | Age: 50
Discharge: HOME | End: 2020-03-20
Payer: COMMERCIAL

## 2020-03-20 DIAGNOSIS — N18.6 ESRD (END STAGE RENAL DISEASE) ON DIALYSIS (HCC): ICD-10-CM

## 2020-03-20 DIAGNOSIS — Z98.890 POSTOPERATIVE STATE: ICD-10-CM

## 2020-03-20 DIAGNOSIS — Z99.2 ESRD (END STAGE RENAL DISEASE) ON DIALYSIS (HCC): ICD-10-CM

## 2020-03-20 PROCEDURE — 93990 DOPPLER FLOW TESTING: CPT

## 2020-03-20 PROCEDURE — 93990 DOPPLER FLOW TESTING: CPT | Performed by: SURGERY

## 2020-03-31 ENCOUNTER — TELEPHONE (OUTPATIENT)
Dept: VASCULAR SURGERY | Facility: CLINIC | Age: 50
End: 2020-03-31

## 2020-03-31 DIAGNOSIS — N18.4 CHRONIC KIDNEY DISEASE, STAGE IV (SEVERE) (HCC): Primary | ICD-10-CM

## 2020-04-07 ENCOUNTER — TELEPHONE (OUTPATIENT)
Dept: INTERVENTIONAL RADIOLOGY/VASCULAR | Facility: HOSPITAL | Age: 50
End: 2020-04-07

## 2020-04-14 ENCOUNTER — TELEPHONE (OUTPATIENT)
Dept: SURGERY | Facility: HOSPITAL | Age: 50
End: 2020-04-14

## 2020-04-14 DIAGNOSIS — N17.9 ACUTE KIDNEY INJURY (HCC): ICD-10-CM

## 2020-04-14 DIAGNOSIS — I10 ESSENTIAL HYPERTENSION: ICD-10-CM

## 2020-04-14 RX ORDER — PREDNISONE 1 MG/1
5 TABLET ORAL DAILY
Qty: 90 TABLET | Refills: 1 | Status: SHIPPED | OUTPATIENT
Start: 2020-04-14 | End: 2020-10-22 | Stop reason: SDUPTHER

## 2020-04-14 RX ORDER — METOPROLOL SUCCINATE 25 MG/1
50 TABLET, EXTENDED RELEASE ORAL EVERY MORNING
Qty: 180 TABLET | Refills: 1 | Status: SHIPPED | OUTPATIENT
Start: 2020-04-14 | End: 2020-06-18 | Stop reason: SDUPTHER

## 2020-04-16 ENCOUNTER — HOSPITAL ENCOUNTER (OUTPATIENT)
Dept: INTERVENTIONAL RADIOLOGY/VASCULAR | Facility: HOSPITAL | Age: 50
Discharge: HOME | End: 2020-04-16
Attending: SURGERY | Admitting: RADIOLOGY
Payer: COMMERCIAL

## 2020-04-16 VITALS
WEIGHT: 140 LBS | OXYGEN SATURATION: 95 % | DIASTOLIC BLOOD PRESSURE: 67 MMHG | SYSTOLIC BLOOD PRESSURE: 148 MMHG | TEMPERATURE: 98.2 F | HEIGHT: 64 IN | HEART RATE: 82 BPM | BODY MASS INDEX: 23.9 KG/M2 | RESPIRATION RATE: 16 BRPM

## 2020-04-16 DIAGNOSIS — N18.4 CHRONIC KIDNEY DISEASE, STAGE IV (SEVERE) (HCC): ICD-10-CM

## 2020-04-16 LAB — GLUCOSE SERPL-MCNC: 249 MG/DL (ref 65–140)

## 2020-04-16 PROCEDURE — C1894 INTRO/SHEATH, NON-LASER: HCPCS

## 2020-04-16 PROCEDURE — 36902 INTRO CATH DIALYSIS CIRCUIT: CPT

## 2020-04-16 PROCEDURE — 76937 US GUIDE VASCULAR ACCESS: CPT | Performed by: RADIOLOGY

## 2020-04-16 PROCEDURE — 99152 MOD SED SAME PHYS/QHP 5/>YRS: CPT | Performed by: RADIOLOGY

## 2020-04-16 PROCEDURE — C1769 GUIDE WIRE: HCPCS

## 2020-04-16 PROCEDURE — 99152 MOD SED SAME PHYS/QHP 5/>YRS: CPT

## 2020-04-16 PROCEDURE — C1725 CATH, TRANSLUMIN NON-LASER: HCPCS

## 2020-04-16 PROCEDURE — 99153 MOD SED SAME PHYS/QHP EA: CPT

## 2020-04-16 PROCEDURE — 36902 INTRO CATH DIALYSIS CIRCUIT: CPT | Performed by: RADIOLOGY

## 2020-04-16 PROCEDURE — 82948 REAGENT STRIP/BLOOD GLUCOSE: CPT

## 2020-04-16 RX ORDER — MIDAZOLAM HYDROCHLORIDE 2 MG/2ML
INJECTION, SOLUTION INTRAMUSCULAR; INTRAVENOUS CODE/TRAUMA/SEDATION MEDICATION
Status: COMPLETED | OUTPATIENT
Start: 2020-04-16 | End: 2020-04-16

## 2020-04-16 RX ORDER — SODIUM CHLORIDE 9 MG/ML
50 INJECTION, SOLUTION INTRAVENOUS CONTINUOUS
Status: CANCELLED | OUTPATIENT
Start: 2020-04-16

## 2020-04-16 RX ORDER — FENTANYL CITRATE 50 UG/ML
INJECTION, SOLUTION INTRAMUSCULAR; INTRAVENOUS CODE/TRAUMA/SEDATION MEDICATION
Status: COMPLETED | OUTPATIENT
Start: 2020-04-16 | End: 2020-04-16

## 2020-04-16 RX ORDER — LIDOCAINE HYDROCHLORIDE 10 MG/ML
INJECTION, SOLUTION EPIDURAL; INFILTRATION; INTRACAUDAL; PERINEURAL CODE/TRAUMA/SEDATION MEDICATION
Status: COMPLETED | OUTPATIENT
Start: 2020-04-16 | End: 2020-04-16

## 2020-04-16 RX ORDER — SODIUM CHLORIDE 9 MG/ML
50 INJECTION, SOLUTION INTRAVENOUS CONTINUOUS
Status: DISCONTINUED | OUTPATIENT
Start: 2020-04-16 | End: 2020-04-20 | Stop reason: HOSPADM

## 2020-04-16 RX ADMIN — IOHEXOL 40 ML: 300 INJECTION, SOLUTION INTRAVENOUS at 11:20

## 2020-04-16 RX ADMIN — FENTANYL CITRATE 50 MCG: 50 INJECTION INTRAMUSCULAR; INTRAVENOUS at 11:24

## 2020-04-16 RX ADMIN — MIDAZOLAM HYDROCHLORIDE 0.5 MG: 1 INJECTION, SOLUTION INTRAMUSCULAR; INTRAVENOUS at 10:57

## 2020-04-16 RX ADMIN — LIDOCAINE HYDROCHLORIDE 10 ML: 10 INJECTION, SOLUTION EPIDURAL; INFILTRATION; INTRACAUDAL; PERINEURAL at 11:04

## 2020-04-16 RX ADMIN — FENTANYL CITRATE 25 MCG: 50 INJECTION INTRAMUSCULAR; INTRAVENOUS at 11:04

## 2020-04-16 RX ADMIN — MIDAZOLAM HYDROCHLORIDE 0.5 MG: 1 INJECTION, SOLUTION INTRAMUSCULAR; INTRAVENOUS at 11:04

## 2020-04-16 RX ADMIN — SODIUM CHLORIDE 50 ML/HR: 0.9 INJECTION, SOLUTION INTRAVENOUS at 09:14

## 2020-04-16 RX ADMIN — FENTANYL CITRATE 25 MCG: 50 INJECTION INTRAMUSCULAR; INTRAVENOUS at 10:57

## 2020-05-10 DIAGNOSIS — I10 HYPERTENSION: ICD-10-CM

## 2020-05-10 DIAGNOSIS — K92.0 GASTROINTESTINAL HEMORRHAGE WITH HEMATEMESIS: ICD-10-CM

## 2020-05-11 DIAGNOSIS — K92.0 GASTROINTESTINAL HEMORRHAGE WITH HEMATEMESIS: ICD-10-CM

## 2020-05-11 DIAGNOSIS — I10 HYPERTENSION: ICD-10-CM

## 2020-05-11 RX ORDER — PANTOPRAZOLE SODIUM 40 MG/1
TABLET, DELAYED RELEASE ORAL
Qty: 90 TABLET | Refills: 0 | Status: SHIPPED | OUTPATIENT
Start: 2020-05-11 | End: 2020-05-11

## 2020-05-11 RX ORDER — PANTOPRAZOLE SODIUM 40 MG/1
TABLET, DELAYED RELEASE ORAL
Qty: 90 TABLET | Refills: 0 | Status: SHIPPED | OUTPATIENT
Start: 2020-05-11 | End: 2020-08-18 | Stop reason: SDUPTHER

## 2020-05-11 RX ORDER — CARVEDILOL 6.25 MG/1
6.25 TABLET ORAL 2 TIMES DAILY WITH MEALS
Qty: 180 TABLET | Refills: 1 | Status: SHIPPED | OUTPATIENT
Start: 2020-05-11 | End: 2020-08-18 | Stop reason: SDUPTHER

## 2020-05-11 RX ORDER — NIFEDIPINE 90 MG/1
TABLET, EXTENDED RELEASE ORAL
Qty: 90 TABLET | Refills: 0 | Status: SHIPPED | OUTPATIENT
Start: 2020-05-11 | End: 2020-05-11 | Stop reason: SDUPTHER

## 2020-05-11 RX ORDER — CARVEDILOL 6.25 MG/1
TABLET ORAL
Qty: 180 TABLET | Refills: 0 | Status: SHIPPED | OUTPATIENT
Start: 2020-05-11 | End: 2020-05-11 | Stop reason: SDUPTHER

## 2020-05-11 RX ORDER — NIFEDIPINE 90 MG/1
90 TABLET, EXTENDED RELEASE ORAL DAILY
Qty: 90 TABLET | Refills: 1 | Status: SHIPPED | OUTPATIENT
Start: 2020-05-11 | End: 2020-08-18 | Stop reason: SDUPTHER

## 2020-05-26 ENCOUNTER — TELEPHONE (OUTPATIENT)
Dept: INTERVENTIONAL RADIOLOGY/VASCULAR | Facility: HOSPITAL | Age: 50
End: 2020-05-26

## 2020-05-28 ENCOUNTER — TELEPHONE (OUTPATIENT)
Dept: INTERVENTIONAL RADIOLOGY/VASCULAR | Facility: HOSPITAL | Age: 50
End: 2020-05-28

## 2020-05-29 ENCOUNTER — HOSPITAL ENCOUNTER (OUTPATIENT)
Dept: INTERVENTIONAL RADIOLOGY/VASCULAR | Facility: HOSPITAL | Age: 50
Discharge: HOME | End: 2020-05-29
Attending: INTERNAL MEDICINE
Payer: COMMERCIAL

## 2020-05-29 DIAGNOSIS — Z99.2 HEMODIALYSIS ACCESS, FISTULA MATURE (HCC): ICD-10-CM

## 2020-05-29 PROCEDURE — 36589 REMOVAL TUNNELED CV CATH: CPT

## 2020-05-29 PROCEDURE — 36589 REMOVAL TUNNELED CV CATH: CPT | Performed by: RADIOLOGY

## 2020-06-03 ENCOUNTER — TELEPHONE (OUTPATIENT)
Dept: FAMILY MEDICINE CLINIC | Facility: CLINIC | Age: 50
End: 2020-06-03

## 2020-06-05 DIAGNOSIS — R93.89 ABNORMAL CT SCAN: Primary | ICD-10-CM

## 2020-06-09 ENCOUNTER — APPOINTMENT (EMERGENCY)
Dept: CT IMAGING | Facility: HOSPITAL | Age: 50
End: 2020-06-09
Payer: COMMERCIAL

## 2020-06-09 ENCOUNTER — HOSPITAL ENCOUNTER (EMERGENCY)
Facility: HOSPITAL | Age: 50
Discharge: HOME | End: 2020-06-09
Attending: EMERGENCY MEDICINE | Admitting: EMERGENCY MEDICINE
Payer: COMMERCIAL

## 2020-06-09 ENCOUNTER — APPOINTMENT (EMERGENCY)
Dept: RADIOLOGY | Facility: HOSPITAL | Age: 50
End: 2020-06-09
Payer: COMMERCIAL

## 2020-06-09 VITALS
DIASTOLIC BLOOD PRESSURE: 72 MMHG | BODY MASS INDEX: 19.08 KG/M2 | TEMPERATURE: 97.4 F | HEIGHT: 64 IN | WEIGHT: 111.77 LBS | SYSTOLIC BLOOD PRESSURE: 146 MMHG | HEART RATE: 67 BPM | RESPIRATION RATE: 16 BRPM | OXYGEN SATURATION: 96 %

## 2020-06-09 DIAGNOSIS — J18.9 PNEUMONIA: ICD-10-CM

## 2020-06-09 DIAGNOSIS — R11.0 NAUSEA: Primary | ICD-10-CM

## 2020-06-09 LAB
ALBUMIN SERPL BCP-MCNC: 3.9 G/DL (ref 3.5–5)
ALP SERPL-CCNC: 192 U/L (ref 46–116)
ALT SERPL W P-5'-P-CCNC: 14 U/L (ref 12–78)
ANION GAP SERPL CALCULATED.3IONS-SCNC: 9 MMOL/L (ref 4–13)
AST SERPL W P-5'-P-CCNC: 21 U/L (ref 5–45)
BASOPHILS # BLD AUTO: 0.05 THOUSANDS/ΜL (ref 0–0.1)
BASOPHILS NFR BLD AUTO: 1 % (ref 0–1)
BILIRUB SERPL-MCNC: 0.9 MG/DL (ref 0.2–1)
BUN SERPL-MCNC: 21 MG/DL (ref 5–25)
CALCIUM SERPL-MCNC: 8.6 MG/DL (ref 8.3–10.1)
CHLORIDE SERPL-SCNC: 96 MMOL/L (ref 100–108)
CO2 SERPL-SCNC: 26 MMOL/L (ref 21–32)
CREAT SERPL-MCNC: 3.56 MG/DL (ref 0.6–1.3)
EOSINOPHIL # BLD AUTO: 0.14 THOUSAND/ΜL (ref 0–0.61)
EOSINOPHIL NFR BLD AUTO: 1 % (ref 0–6)
ERYTHROCYTE [DISTWIDTH] IN BLOOD BY AUTOMATED COUNT: 14.8 % (ref 11.6–15.1)
GFR SERPL CREATININE-BSD FRML MDRD: 19 ML/MIN/1.73SQ M
GLUCOSE SERPL-MCNC: 170 MG/DL (ref 65–140)
GLUCOSE SERPL-MCNC: 175 MG/DL (ref 65–140)
HCT VFR BLD AUTO: 36.7 % (ref 36.5–49.3)
HGB BLD-MCNC: 11.8 G/DL (ref 12–17)
IMM GRANULOCYTES # BLD AUTO: 0.05 THOUSAND/UL (ref 0–0.2)
IMM GRANULOCYTES NFR BLD AUTO: 1 % (ref 0–2)
LYMPHOCYTES # BLD AUTO: 1.42 THOUSANDS/ΜL (ref 0.6–4.47)
LYMPHOCYTES NFR BLD AUTO: 14 % (ref 14–44)
MCH RBC QN AUTO: 31.4 PG (ref 26.8–34.3)
MCHC RBC AUTO-ENTMCNC: 32.2 G/DL (ref 31.4–37.4)
MCV RBC AUTO: 98 FL (ref 82–98)
MONOCYTES # BLD AUTO: 0.71 THOUSAND/ΜL (ref 0.17–1.22)
MONOCYTES NFR BLD AUTO: 7 % (ref 4–12)
NEUTROPHILS # BLD AUTO: 8.05 THOUSANDS/ΜL (ref 1.85–7.62)
NEUTS SEG NFR BLD AUTO: 76 % (ref 43–75)
NRBC BLD AUTO-RTO: 0 /100 WBCS
PLATELET # BLD AUTO: 353 THOUSANDS/UL (ref 149–390)
PMV BLD AUTO: 8.2 FL (ref 8.9–12.7)
POTASSIUM SERPL-SCNC: 4 MMOL/L (ref 3.5–5.3)
PROT SERPL-MCNC: 8.6 G/DL (ref 6.4–8.2)
RBC # BLD AUTO: 3.76 MILLION/UL (ref 3.88–5.62)
SODIUM SERPL-SCNC: 131 MMOL/L (ref 136–145)
TROPONIN I SERPL-MCNC: <0.02 NG/ML
WBC # BLD AUTO: 10.42 THOUSAND/UL (ref 4.31–10.16)

## 2020-06-09 PROCEDURE — 36415 COLL VENOUS BLD VENIPUNCTURE: CPT | Performed by: EMERGENCY MEDICINE

## 2020-06-09 PROCEDURE — 99285 EMERGENCY DEPT VISIT HI MDM: CPT | Performed by: EMERGENCY MEDICINE

## 2020-06-09 PROCEDURE — 82948 REAGENT STRIP/BLOOD GLUCOSE: CPT

## 2020-06-09 PROCEDURE — 96374 THER/PROPH/DIAG INJ IV PUSH: CPT

## 2020-06-09 PROCEDURE — 70450 CT HEAD/BRAIN W/O DYE: CPT

## 2020-06-09 PROCEDURE — 84484 ASSAY OF TROPONIN QUANT: CPT | Performed by: EMERGENCY MEDICINE

## 2020-06-09 PROCEDURE — 93005 ELECTROCARDIOGRAM TRACING: CPT

## 2020-06-09 PROCEDURE — 85025 COMPLETE CBC W/AUTO DIFF WBC: CPT | Performed by: EMERGENCY MEDICINE

## 2020-06-09 PROCEDURE — 71046 X-RAY EXAM CHEST 2 VIEWS: CPT

## 2020-06-09 PROCEDURE — 74176 CT ABD & PELVIS W/O CONTRAST: CPT

## 2020-06-09 PROCEDURE — 96376 TX/PRO/DX INJ SAME DRUG ADON: CPT

## 2020-06-09 PROCEDURE — 80053 COMPREHEN METABOLIC PANEL: CPT | Performed by: EMERGENCY MEDICINE

## 2020-06-09 PROCEDURE — 99284 EMERGENCY DEPT VISIT MOD MDM: CPT

## 2020-06-09 RX ORDER — ONDANSETRON 2 MG/ML
4 INJECTION INTRAMUSCULAR; INTRAVENOUS ONCE
Status: COMPLETED | OUTPATIENT
Start: 2020-06-09 | End: 2020-06-09

## 2020-06-09 RX ORDER — ALBUTEROL SULFATE 90 UG/1
2 AEROSOL, METERED RESPIRATORY (INHALATION) ONCE
Status: COMPLETED | OUTPATIENT
Start: 2020-06-09 | End: 2020-06-09

## 2020-06-09 RX ORDER — ONDANSETRON 4 MG/1
4 TABLET, ORALLY DISINTEGRATING ORAL EVERY 6 HOURS PRN
Qty: 20 TABLET | Refills: 0 | Status: SHIPPED | OUTPATIENT
Start: 2020-06-09 | End: 2021-05-28

## 2020-06-09 RX ORDER — DOXYCYCLINE HYCLATE 100 MG/1
100 CAPSULE ORAL 2 TIMES DAILY
Qty: 14 CAPSULE | Refills: 0 | Status: SHIPPED | OUTPATIENT
Start: 2020-06-09 | End: 2020-06-16

## 2020-06-09 RX ORDER — DOXYCYCLINE HYCLATE 100 MG/1
100 CAPSULE ORAL ONCE
Status: COMPLETED | OUTPATIENT
Start: 2020-06-09 | End: 2020-06-09

## 2020-06-09 RX ADMIN — ALBUTEROL SULFATE 2 PUFF: 90 AEROSOL, METERED RESPIRATORY (INHALATION) at 19:28

## 2020-06-09 RX ADMIN — DOXYCYCLINE HYCLATE 100 MG: 100 CAPSULE ORAL at 19:27

## 2020-06-09 RX ADMIN — ONDANSETRON 4 MG: 2 INJECTION INTRAMUSCULAR; INTRAVENOUS at 19:25

## 2020-06-09 RX ADMIN — ONDANSETRON 4 MG: 2 INJECTION INTRAMUSCULAR; INTRAVENOUS at 16:34

## 2020-06-10 LAB
ATRIAL RATE: 60 BPM
P AXIS: 54 DEGREES
PR INTERVAL: 142 MS
QRS AXIS: 17 DEGREES
QRSD INTERVAL: 94 MS
QT INTERVAL: 476 MS
QTC INTERVAL: 476 MS
T WAVE AXIS: 94 DEGREES
VENTRICULAR RATE: 60 BPM

## 2020-06-10 PROCEDURE — 93010 ELECTROCARDIOGRAM REPORT: CPT | Performed by: INTERNAL MEDICINE

## 2020-06-18 DIAGNOSIS — I10 ESSENTIAL HYPERTENSION: ICD-10-CM

## 2020-06-18 RX ORDER — METOPROLOL SUCCINATE 50 MG/1
50 TABLET, EXTENDED RELEASE ORAL EVERY MORNING
Qty: 90 TABLET | Refills: 2 | Status: SHIPPED | OUTPATIENT
Start: 2020-06-18 | End: 2020-10-07 | Stop reason: SDUPTHER

## 2020-06-22 ENCOUNTER — CONSULT (OUTPATIENT)
Dept: GASTROENTEROLOGY | Facility: CLINIC | Age: 50
End: 2020-06-22
Payer: COMMERCIAL

## 2020-06-22 VITALS
BODY MASS INDEX: 28.95 KG/M2 | DIASTOLIC BLOOD PRESSURE: 70 MMHG | WEIGHT: 169.6 LBS | HEIGHT: 64 IN | SYSTOLIC BLOOD PRESSURE: 155 MMHG | TEMPERATURE: 97 F | HEART RATE: 77 BPM

## 2020-06-22 DIAGNOSIS — R93.3 ABNORMAL CT SCAN, COLON: ICD-10-CM

## 2020-06-22 DIAGNOSIS — K86.9 LESION OF PANCREAS: ICD-10-CM

## 2020-06-22 DIAGNOSIS — Z11.59 SCREENING FOR VIRAL DISEASE: Primary | ICD-10-CM

## 2020-06-22 PROCEDURE — 99244 OFF/OP CNSLTJ NEW/EST MOD 40: CPT | Performed by: INTERNAL MEDICINE

## 2020-06-22 RX ORDER — POLYETHYLENE GLYCOL 3350, SODIUM CHLORIDE, SODIUM BICARBONATE, POTASSIUM CHLORIDE 420; 11.2; 5.72; 1.48 G/4L; G/4L; G/4L; G/4L
4000 POWDER, FOR SOLUTION ORAL ONCE
Qty: 4000 ML | Refills: 0 | Status: SHIPPED | OUTPATIENT
Start: 2020-06-22 | End: 2020-08-25 | Stop reason: ALTCHOICE

## 2020-06-23 ENCOUNTER — TELEPHONE (OUTPATIENT)
Dept: NEUROLOGY | Facility: CLINIC | Age: 50
End: 2020-06-23

## 2020-07-10 DIAGNOSIS — G62.9 NEUROPATHY: Primary | ICD-10-CM

## 2020-08-18 DIAGNOSIS — I10 HYPERTENSION: ICD-10-CM

## 2020-08-18 DIAGNOSIS — K92.0 GASTROINTESTINAL HEMORRHAGE WITH HEMATEMESIS: ICD-10-CM

## 2020-08-18 RX ORDER — CARVEDILOL 6.25 MG/1
6.25 TABLET ORAL 2 TIMES DAILY WITH MEALS
Qty: 180 TABLET | Refills: 2 | Status: SHIPPED | OUTPATIENT
Start: 2020-08-18 | End: 2020-10-07 | Stop reason: ALTCHOICE

## 2020-08-18 RX ORDER — NIFEDIPINE 90 MG/1
90 TABLET, EXTENDED RELEASE ORAL DAILY
Qty: 90 TABLET | Refills: 2 | Status: SHIPPED | OUTPATIENT
Start: 2020-08-18 | End: 2021-05-26 | Stop reason: SDUPTHER

## 2020-08-18 RX ORDER — AZATHIOPRINE 50 MG/1
50 TABLET ORAL EVERY MORNING
Qty: 90 TABLET | Refills: 2 | Status: SHIPPED | OUTPATIENT
Start: 2020-08-18 | End: 2021-06-07 | Stop reason: SDUPTHER

## 2020-08-18 RX ORDER — PANTOPRAZOLE SODIUM 40 MG/1
40 TABLET, DELAYED RELEASE ORAL DAILY
Qty: 90 TABLET | Refills: 2 | Status: SHIPPED | OUTPATIENT
Start: 2020-08-18 | End: 2021-05-07 | Stop reason: SDUPTHER

## 2020-08-25 ENCOUNTER — APPOINTMENT (INPATIENT)
Dept: DIALYSIS | Facility: HOSPITAL | Age: 50
DRG: 189 | End: 2020-08-25
Payer: COMMERCIAL

## 2020-08-25 ENCOUNTER — APPOINTMENT (INPATIENT)
Dept: NON INVASIVE DIAGNOSTICS | Facility: HOSPITAL | Age: 50
DRG: 189 | End: 2020-08-25
Payer: COMMERCIAL

## 2020-08-25 ENCOUNTER — APPOINTMENT (EMERGENCY)
Dept: RADIOLOGY | Facility: HOSPITAL | Age: 50
DRG: 189 | End: 2020-08-25
Payer: COMMERCIAL

## 2020-08-25 ENCOUNTER — HOSPITAL ENCOUNTER (INPATIENT)
Facility: HOSPITAL | Age: 50
LOS: 2 days | Discharge: LEFT BEFORE DISCHARGE | DRG: 189 | End: 2020-08-27
Attending: EMERGENCY MEDICINE | Admitting: HOSPITALIST
Payer: COMMERCIAL

## 2020-08-25 DIAGNOSIS — J81.0 ACUTE PULMONARY EDEMA (HCC): ICD-10-CM

## 2020-08-25 DIAGNOSIS — J96.01 ACUTE RESPIRATORY FAILURE WITH HYPOXIA (HCC): Primary | ICD-10-CM

## 2020-08-25 DIAGNOSIS — N18.6 ESRD (END STAGE RENAL DISEASE) ON DIALYSIS (HCC): ICD-10-CM

## 2020-08-25 DIAGNOSIS — A41.9 SEVERE SEPSIS (HCC): ICD-10-CM

## 2020-08-25 DIAGNOSIS — Z99.2 ESRD (END STAGE RENAL DISEASE) ON DIALYSIS (HCC): ICD-10-CM

## 2020-08-25 DIAGNOSIS — J18.9 PNEUMONIA: ICD-10-CM

## 2020-08-25 DIAGNOSIS — R65.20 SEVERE SEPSIS (HCC): ICD-10-CM

## 2020-08-25 PROBLEM — R06.03 RESPIRATORY DISTRESS: Status: ACTIVE | Noted: 2020-08-25

## 2020-08-25 LAB
ALBUMIN SERPL BCP-MCNC: 3.1 G/DL (ref 3.5–5)
ALP SERPL-CCNC: 183 U/L (ref 46–116)
ALT SERPL W P-5'-P-CCNC: 11 U/L (ref 12–78)
ANION GAP SERPL CALCULATED.3IONS-SCNC: 10 MMOL/L (ref 4–13)
ANION GAP SERPL CALCULATED.3IONS-SCNC: 13 MMOL/L (ref 4–13)
AST SERPL W P-5'-P-CCNC: 12 U/L (ref 5–45)
ATRIAL RATE: 78 BPM
BASOPHILS # BLD AUTO: 0.04 THOUSANDS/ΜL (ref 0–0.1)
BASOPHILS NFR BLD AUTO: 1 % (ref 0–1)
BILIRUB SERPL-MCNC: 0.6 MG/DL (ref 0.2–1)
BUN SERPL-MCNC: 61 MG/DL (ref 5–25)
BUN SERPL-MCNC: 63 MG/DL (ref 5–25)
CALCIUM SERPL-MCNC: 8.4 MG/DL (ref 8.3–10.1)
CALCIUM SERPL-MCNC: 8.6 MG/DL (ref 8.3–10.1)
CHLORIDE SERPL-SCNC: 94 MMOL/L (ref 100–108)
CHLORIDE SERPL-SCNC: 96 MMOL/L (ref 100–108)
CO2 SERPL-SCNC: 22 MMOL/L (ref 21–32)
CO2 SERPL-SCNC: 24 MMOL/L (ref 21–32)
CREAT SERPL-MCNC: 7.27 MG/DL (ref 0.6–1.3)
CREAT SERPL-MCNC: 7.48 MG/DL (ref 0.6–1.3)
EOSINOPHIL # BLD AUTO: 0.08 THOUSAND/ΜL (ref 0–0.61)
EOSINOPHIL NFR BLD AUTO: 1 % (ref 0–6)
ERYTHROCYTE [DISTWIDTH] IN BLOOD BY AUTOMATED COUNT: 12.9 % (ref 11.6–15.1)
ERYTHROCYTE [DISTWIDTH] IN BLOOD BY AUTOMATED COUNT: 13 % (ref 11.6–15.1)
GFR SERPL CREATININE-BSD FRML MDRD: 8 ML/MIN/1.73SQ M
GFR SERPL CREATININE-BSD FRML MDRD: 8 ML/MIN/1.73SQ M
GLUCOSE SERPL-MCNC: 185 MG/DL (ref 65–140)
GLUCOSE SERPL-MCNC: 203 MG/DL (ref 65–140)
GLUCOSE SERPL-MCNC: 258 MG/DL (ref 65–140)
GLUCOSE SERPL-MCNC: 266 MG/DL (ref 65–140)
GLUCOSE SERPL-MCNC: 276 MG/DL (ref 65–140)
GLUCOSE SERPL-MCNC: 367 MG/DL (ref 65–140)
GLUCOSE SERPL-MCNC: 412 MG/DL (ref 65–140)
GLUCOSE SERPL-MCNC: 429 MG/DL (ref 65–140)
HCT VFR BLD AUTO: 30.4 % (ref 36.5–49.3)
HCT VFR BLD AUTO: 33.4 % (ref 36.5–49.3)
HGB BLD-MCNC: 11 G/DL (ref 12–17)
HGB BLD-MCNC: 9.8 G/DL (ref 12–17)
IMM GRANULOCYTES # BLD AUTO: 0.04 THOUSAND/UL (ref 0–0.2)
IMM GRANULOCYTES NFR BLD AUTO: 1 % (ref 0–2)
LYMPHOCYTES # BLD AUTO: 1.25 THOUSANDS/ΜL (ref 0.6–4.47)
LYMPHOCYTES NFR BLD AUTO: 15 % (ref 14–44)
MAGNESIUM SERPL-MCNC: 3.2 MG/DL (ref 1.6–2.6)
MCH RBC QN AUTO: 30.6 PG (ref 26.8–34.3)
MCH RBC QN AUTO: 31.3 PG (ref 26.8–34.3)
MCHC RBC AUTO-ENTMCNC: 32.2 G/DL (ref 31.4–37.4)
MCHC RBC AUTO-ENTMCNC: 32.9 G/DL (ref 31.4–37.4)
MCV RBC AUTO: 95 FL (ref 82–98)
MCV RBC AUTO: 95 FL (ref 82–98)
MONOCYTES # BLD AUTO: 0.38 THOUSAND/ΜL (ref 0.17–1.22)
MONOCYTES NFR BLD AUTO: 4 % (ref 4–12)
NEUTROPHILS # BLD AUTO: 6.76 THOUSANDS/ΜL (ref 1.85–7.62)
NEUTS SEG NFR BLD AUTO: 78 % (ref 43–75)
NRBC BLD AUTO-RTO: 0 /100 WBCS
NT-PROBNP SERPL-MCNC: ABNORMAL PG/ML
P AXIS: 61 DEGREES
PLATELET # BLD AUTO: 238 THOUSANDS/UL (ref 149–390)
PLATELET # BLD AUTO: 271 THOUSANDS/UL (ref 149–390)
PMV BLD AUTO: 8.4 FL (ref 8.9–12.7)
PMV BLD AUTO: 8.7 FL (ref 8.9–12.7)
POTASSIUM SERPL-SCNC: 5.1 MMOL/L (ref 3.5–5.3)
POTASSIUM SERPL-SCNC: 5.2 MMOL/L (ref 3.5–5.3)
PR INTERVAL: 136 MS
PROCALCITONIN SERPL-MCNC: 0.36 NG/ML
PROT SERPL-MCNC: 7 G/DL (ref 6.4–8.2)
QRS AXIS: 43 DEGREES
QRSD INTERVAL: 92 MS
QT INTERVAL: 402 MS
QTC INTERVAL: 458 MS
RBC # BLD AUTO: 3.2 MILLION/UL (ref 3.88–5.62)
RBC # BLD AUTO: 3.51 MILLION/UL (ref 3.88–5.62)
SARS-COV-2 RNA RESP QL NAA+PROBE: NEGATIVE
SODIUM SERPL-SCNC: 128 MMOL/L (ref 136–145)
SODIUM SERPL-SCNC: 131 MMOL/L (ref 136–145)
T WAVE AXIS: 92 DEGREES
TROPONIN I SERPL-MCNC: <0.02 NG/ML
TSH SERPL DL<=0.05 MIU/L-ACNC: 1.44 UIU/ML (ref 0.36–3.74)
VENTRICULAR RATE: 78 BPM
WBC # BLD AUTO: 11.78 THOUSAND/UL (ref 4.31–10.16)
WBC # BLD AUTO: 8.55 THOUSAND/UL (ref 4.31–10.16)

## 2020-08-25 PROCEDURE — 84443 ASSAY THYROID STIM HORMONE: CPT | Performed by: HOSPITALIST

## 2020-08-25 PROCEDURE — 87040 BLOOD CULTURE FOR BACTERIA: CPT | Performed by: EMERGENCY MEDICINE

## 2020-08-25 PROCEDURE — 84145 PROCALCITONIN (PCT): CPT | Performed by: HOSPITALIST

## 2020-08-25 PROCEDURE — 93010 ELECTROCARDIOGRAM REPORT: CPT | Performed by: INTERNAL MEDICINE

## 2020-08-25 PROCEDURE — 83735 ASSAY OF MAGNESIUM: CPT | Performed by: EMERGENCY MEDICINE

## 2020-08-25 PROCEDURE — 71045 X-RAY EXAM CHEST 1 VIEW: CPT

## 2020-08-25 PROCEDURE — 5A09457 ASSISTANCE WITH RESPIRATORY VENTILATION, 24-96 CONSECUTIVE HOURS, CONTINUOUS POSITIVE AIRWAY PRESSURE: ICD-10-PCS | Performed by: FAMILY MEDICINE

## 2020-08-25 PROCEDURE — 87449 NOS EACH ORGANISM AG IA: CPT | Performed by: FAMILY MEDICINE

## 2020-08-25 PROCEDURE — 96374 THER/PROPH/DIAG INJ IV PUSH: CPT

## 2020-08-25 PROCEDURE — 80048 BASIC METABOLIC PNL TOTAL CA: CPT | Performed by: EMERGENCY MEDICINE

## 2020-08-25 PROCEDURE — 99255 IP/OBS CONSLTJ NEW/EST HI 80: CPT | Performed by: INTERNAL MEDICINE

## 2020-08-25 PROCEDURE — 94002 VENT MGMT INPAT INIT DAY: CPT

## 2020-08-25 PROCEDURE — 84484 ASSAY OF TROPONIN QUANT: CPT | Performed by: EMERGENCY MEDICINE

## 2020-08-25 PROCEDURE — 94760 N-INVAS EAR/PLS OXIMETRY 1: CPT

## 2020-08-25 PROCEDURE — 83880 ASSAY OF NATRIURETIC PEPTIDE: CPT | Performed by: EMERGENCY MEDICINE

## 2020-08-25 PROCEDURE — 82948 REAGENT STRIP/BLOOD GLUCOSE: CPT

## 2020-08-25 PROCEDURE — 85025 COMPLETE CBC W/AUTO DIFF WBC: CPT | Performed by: EMERGENCY MEDICINE

## 2020-08-25 PROCEDURE — 80053 COMPREHEN METABOLIC PANEL: CPT | Performed by: HOSPITALIST

## 2020-08-25 PROCEDURE — 87635 SARS-COV-2 COVID-19 AMP PRB: CPT | Performed by: FAMILY MEDICINE

## 2020-08-25 PROCEDURE — 99291 CRITICAL CARE FIRST HOUR: CPT

## 2020-08-25 PROCEDURE — 99291 CRITICAL CARE FIRST HOUR: CPT | Performed by: EMERGENCY MEDICINE

## 2020-08-25 PROCEDURE — 36415 COLL VENOUS BLD VENIPUNCTURE: CPT | Performed by: EMERGENCY MEDICINE

## 2020-08-25 PROCEDURE — 93306 TTE W/DOPPLER COMPLETE: CPT

## 2020-08-25 PROCEDURE — 85027 COMPLETE CBC AUTOMATED: CPT | Performed by: HOSPITALIST

## 2020-08-25 PROCEDURE — 99254 IP/OBS CNSLTJ NEW/EST MOD 60: CPT | Performed by: INTERNAL MEDICINE

## 2020-08-25 PROCEDURE — 93306 TTE W/DOPPLER COMPLETE: CPT | Performed by: INTERNAL MEDICINE

## 2020-08-25 PROCEDURE — 87147 CULTURE TYPE IMMUNOLOGIC: CPT | Performed by: EMERGENCY MEDICINE

## 2020-08-25 PROCEDURE — 96365 THER/PROPH/DIAG IV INF INIT: CPT

## 2020-08-25 PROCEDURE — 99223 1ST HOSP IP/OBS HIGH 75: CPT | Performed by: HOSPITALIST

## 2020-08-25 PROCEDURE — 93005 ELECTROCARDIOGRAM TRACING: CPT

## 2020-08-25 PROCEDURE — 87077 CULTURE AEROBIC IDENTIFY: CPT | Performed by: EMERGENCY MEDICINE

## 2020-08-25 RX ORDER — METOPROLOL SUCCINATE 50 MG/1
50 TABLET, EXTENDED RELEASE ORAL EVERY MORNING
Status: DISCONTINUED | OUTPATIENT
Start: 2020-08-25 | End: 2020-08-27 | Stop reason: HOSPADM

## 2020-08-25 RX ORDER — SODIUM CHLORIDE 9 MG/ML
3 INJECTION INTRAVENOUS
Status: DISCONTINUED | OUTPATIENT
Start: 2020-08-25 | End: 2020-08-27 | Stop reason: HOSPADM

## 2020-08-25 RX ORDER — CARVEDILOL 3.12 MG/1
6.25 TABLET ORAL 2 TIMES DAILY WITH MEALS
Status: DISCONTINUED | OUTPATIENT
Start: 2020-08-25 | End: 2020-08-25

## 2020-08-25 RX ORDER — TACROLIMUS 1 MG/1
2 CAPSULE ORAL
Status: DISCONTINUED | OUTPATIENT
Start: 2020-08-25 | End: 2020-08-25

## 2020-08-25 RX ORDER — ALBUTEROL SULFATE 2.5 MG/3ML
2.5 SOLUTION RESPIRATORY (INHALATION) EVERY 4 HOURS PRN
Status: DISCONTINUED | OUTPATIENT
Start: 2020-08-25 | End: 2020-08-27 | Stop reason: HOSPADM

## 2020-08-25 RX ORDER — FUROSEMIDE 10 MG/ML
40 INJECTION INTRAMUSCULAR; INTRAVENOUS
Status: DISCONTINUED | OUTPATIENT
Start: 2020-08-25 | End: 2020-08-27 | Stop reason: HOSPADM

## 2020-08-25 RX ORDER — CEFEPIME HYDROCHLORIDE 1 G/50ML
1000 INJECTION, SOLUTION INTRAVENOUS ONCE
Status: DISCONTINUED | OUTPATIENT
Start: 2020-08-25 | End: 2020-08-25

## 2020-08-25 RX ORDER — CEFEPIME HYDROCHLORIDE 2 G/50ML
2000 INJECTION, SOLUTION INTRAVENOUS EVERY 24 HOURS
Status: DISCONTINUED | OUTPATIENT
Start: 2020-08-25 | End: 2020-08-25

## 2020-08-25 RX ORDER — TACROLIMUS 1 MG/1
1 CAPSULE ORAL 3 TIMES DAILY
Status: DISCONTINUED | OUTPATIENT
Start: 2020-08-25 | End: 2020-08-27 | Stop reason: HOSPADM

## 2020-08-25 RX ORDER — TACROLIMUS 1 MG/1
3 CAPSULE ORAL EVERY MORNING
Status: DISCONTINUED | OUTPATIENT
Start: 2020-08-25 | End: 2020-08-25

## 2020-08-25 RX ORDER — PANTOPRAZOLE SODIUM 40 MG/1
40 TABLET, DELAYED RELEASE ORAL
Status: DISCONTINUED | OUTPATIENT
Start: 2020-08-25 | End: 2020-08-27 | Stop reason: HOSPADM

## 2020-08-25 RX ORDER — MELATONIN
1000 EVERY MORNING
Status: DISCONTINUED | OUTPATIENT
Start: 2020-08-25 | End: 2020-08-27 | Stop reason: HOSPADM

## 2020-08-25 RX ORDER — FUROSEMIDE 10 MG/ML
40 INJECTION INTRAMUSCULAR; INTRAVENOUS ONCE
Status: COMPLETED | OUTPATIENT
Start: 2020-08-25 | End: 2020-08-25

## 2020-08-25 RX ORDER — HEPARIN SODIUM 5000 [USP'U]/ML
5000 INJECTION, SOLUTION INTRAVENOUS; SUBCUTANEOUS EVERY 8 HOURS SCHEDULED
Status: DISCONTINUED | OUTPATIENT
Start: 2020-08-25 | End: 2020-08-27 | Stop reason: HOSPADM

## 2020-08-25 RX ORDER — ATORVASTATIN CALCIUM 40 MG/1
80 TABLET, FILM COATED ORAL
Status: DISCONTINUED | OUTPATIENT
Start: 2020-08-25 | End: 2020-08-27 | Stop reason: HOSPADM

## 2020-08-25 RX ORDER — MAGNESIUM SULFATE 500 MG/ML
1 VIAL (ML) INJECTION ONCE
Status: DISCONTINUED | OUTPATIENT
Start: 2020-08-25 | End: 2020-08-25

## 2020-08-25 RX ORDER — NIFEDIPINE 30 MG/1
90 TABLET, EXTENDED RELEASE ORAL DAILY
Status: DISCONTINUED | OUTPATIENT
Start: 2020-08-25 | End: 2020-08-27 | Stop reason: HOSPADM

## 2020-08-25 RX ORDER — METHYLPREDNISOLONE SOD SUCC 125 MG
1 VIAL (EA) INJECTION ONCE
Status: COMPLETED | OUTPATIENT
Start: 2020-08-25 | End: 2020-08-25

## 2020-08-25 RX ORDER — PREDNISONE 1 MG/1
5 TABLET ORAL DAILY
Status: DISCONTINUED | OUTPATIENT
Start: 2020-08-25 | End: 2020-08-27 | Stop reason: HOSPADM

## 2020-08-25 RX ORDER — AZATHIOPRINE 50 MG/1
50 TABLET ORAL EVERY MORNING
Status: DISCONTINUED | OUTPATIENT
Start: 2020-08-25 | End: 2020-08-27 | Stop reason: HOSPADM

## 2020-08-25 RX ORDER — ASPIRIN 81 MG/1
81 TABLET, CHEWABLE ORAL EVERY MORNING
Status: DISCONTINUED | OUTPATIENT
Start: 2020-08-25 | End: 2020-08-27 | Stop reason: HOSPADM

## 2020-08-25 RX ORDER — CALCIUM CARBONATE 200(500)MG
500 TABLET,CHEWABLE ORAL 2 TIMES DAILY
Status: DISCONTINUED | OUTPATIENT
Start: 2020-08-25 | End: 2020-08-27 | Stop reason: HOSPADM

## 2020-08-25 RX ORDER — CEFEPIME HYDROCHLORIDE 1 G/50ML
1000 INJECTION, SOLUTION INTRAVENOUS EVERY 24 HOURS
Status: DISCONTINUED | OUTPATIENT
Start: 2020-08-26 | End: 2020-08-27 | Stop reason: HOSPADM

## 2020-08-25 RX ORDER — MAGNESIUM SULFATE HEPTAHYDRATE 40 MG/ML
1 INJECTION, SOLUTION INTRAVENOUS ONCE
Status: COMPLETED | OUTPATIENT
Start: 2020-08-25 | End: 2020-08-25

## 2020-08-25 RX ORDER — NITROGLYCERIN 20 MG/100ML
100 INJECTION INTRAVENOUS
Status: DISCONTINUED | OUTPATIENT
Start: 2020-08-25 | End: 2020-08-26

## 2020-08-25 RX ORDER — ONDANSETRON 2 MG/ML
4 INJECTION INTRAMUSCULAR; INTRAVENOUS EVERY 4 HOURS PRN
Status: DISCONTINUED | OUTPATIENT
Start: 2020-08-25 | End: 2020-08-27 | Stop reason: HOSPADM

## 2020-08-25 RX ADMIN — INSULIN DETEMIR 7 UNITS: 100 INJECTION, SOLUTION SUBCUTANEOUS at 17:11

## 2020-08-25 RX ADMIN — PREDNISONE 5 MG: 5 TABLET ORAL at 08:21

## 2020-08-25 RX ADMIN — METOPROLOL SUCCINATE 50 MG: 50 TABLET, EXTENDED RELEASE ORAL at 08:21

## 2020-08-25 RX ADMIN — NITROGLYCERIN 100 MCG/MIN: 20 INJECTION INTRAVENOUS at 00:51

## 2020-08-25 RX ADMIN — INSULIN LISPRO 1 UNITS: 100 INJECTION, SOLUTION INTRAVENOUS; SUBCUTANEOUS at 16:45

## 2020-08-25 RX ADMIN — FUROSEMIDE 40 MG: 10 INJECTION, SOLUTION INTRAMUSCULAR; INTRAVENOUS at 01:57

## 2020-08-25 RX ADMIN — INSULIN LISPRO 4 UNITS: 100 INJECTION, SOLUTION INTRAVENOUS; SUBCUTANEOUS at 21:01

## 2020-08-25 RX ADMIN — ASPIRIN 81 MG 81 MG: 81 TABLET ORAL at 08:20

## 2020-08-25 RX ADMIN — NIFEDIPINE 90 MG: 30 TABLET, FILM COATED, EXTENDED RELEASE ORAL at 08:20

## 2020-08-25 RX ADMIN — INSULIN LISPRO 1 UNITS: 100 INJECTION, SOLUTION INTRAVENOUS; SUBCUTANEOUS at 12:10

## 2020-08-25 RX ADMIN — ATORVASTATIN CALCIUM 80 MG: 40 TABLET, FILM COATED ORAL at 17:12

## 2020-08-25 RX ADMIN — VANCOMYCIN HYDROCHLORIDE 500 MG: 500 INJECTION, POWDER, LYOPHILIZED, FOR SOLUTION INTRAVENOUS at 11:30

## 2020-08-25 RX ADMIN — AZATHIOPRINE 50 MG: 50 TABLET ORAL at 08:20

## 2020-08-25 RX ADMIN — INSULIN DETEMIR 7 UNITS: 100 INJECTION, SOLUTION SUBCUTANEOUS at 08:30

## 2020-08-25 RX ADMIN — PANTOPRAZOLE SODIUM 40 MG: 40 TABLET, DELAYED RELEASE ORAL at 05:36

## 2020-08-25 RX ADMIN — NITROGLYCERIN 98 MCG/MIN: 20 INJECTION INTRAVENOUS at 08:25

## 2020-08-25 RX ADMIN — HEPARIN SODIUM 5000 UNITS: 5000 INJECTION INTRAVENOUS; SUBCUTANEOUS at 14:16

## 2020-08-25 RX ADMIN — Medication 750 MG: at 02:12

## 2020-08-25 RX ADMIN — CEFEPIME HYDROCHLORIDE 1000 MG: 1 INJECTION, SOLUTION INTRAVENOUS at 01:20

## 2020-08-25 RX ADMIN — Medication 2.25 G: at 03:20

## 2020-08-25 RX ADMIN — NITROGLYCERIN 100 MCG/MIN: 20 INJECTION INTRAVENOUS at 00:47

## 2020-08-25 RX ADMIN — HEPARIN SODIUM 5000 UNITS: 5000 INJECTION INTRAVENOUS; SUBCUTANEOUS at 05:36

## 2020-08-25 RX ADMIN — INSULIN LISPRO 2 UNITS: 100 INJECTION, SOLUTION INTRAVENOUS; SUBCUTANEOUS at 08:24

## 2020-08-25 RX ADMIN — HEPARIN SODIUM 5000 UNITS: 5000 INJECTION INTRAVENOUS; SUBCUTANEOUS at 21:02

## 2020-08-25 RX ADMIN — TACROLIMUS 1 MG: 1 CAPSULE ORAL at 14:00

## 2020-08-25 RX ADMIN — TACROLIMUS 1 MG: 1 CAPSULE ORAL at 20:00

## 2020-08-25 RX ADMIN — FUROSEMIDE 40 MG: 10 INJECTION, SOLUTION INTRAMUSCULAR; INTRAVENOUS at 16:52

## 2020-08-25 RX ADMIN — FUROSEMIDE 40 MG: 10 INJECTION, SOLUTION INTRAMUSCULAR; INTRAVENOUS at 08:24

## 2020-08-25 RX ADMIN — TACROLIMUS 1 MG: 1 CAPSULE ORAL at 08:22

## 2020-08-25 NOTE — PROGRESS NOTES
Patient requested for his bipap to come off  Per RT, ok to take him off   4L NC applied, current SPO2 92%

## 2020-08-25 NOTE — CONSULTS
Consultation - Nephrology   Toni Galloway 47 y o  male MRN: [id number]  Unit/Bed#:  Encounter: [id number]      A/P:  1  End-stage renal disease   Will continue hemodialysis sessions Tuesday Thursday Saturday in the inpatient setting, hemodialysis nurses were contacted this morning by myself and will be coming later today to provide a appropriate treatment  Orders are in the chart for 3 hours, will try to ultrafiltrate between 2-3 L or as tolerated by the patient  2  Secondary hyperparathyroidism   Patient currently on diet control and does not require phosphorus binders  Continue closely monitor phosphorus in the inpatient setting  3  History of living related renal transplant on chronic immunosuppression   Continue current medications, patient currently on tacrolimus and azathioprine  If indicated, and if the patient's clinical status deteriorates we should hold Azathioprine  Patient also is on daily dose of prednisone, depending on his clinical course, he may also require stress dose hydrocortisone IV  Continue monitor hemodynamics closely  4  Hypertension setting of end-stage renal disease   Continue current medications, however, as mentioned above will need to be cautious with the patient's current clinical status which may require medications to be held for discontinue altogether the short term until the patient improves in general   5  Respiratory distress with hypoxia    Improved with oxygen via nasal cannula, will try to ultrafilter as tolerated, between 2-3 L with hemodialysis today  Patient potentially has a right-sided pneumonia, currently on treatment with vancomycin piperacillin tazobactam   This will be transitioned to vancomycin and cefepime  There is no concern for aspiration at this time  6  Diabetic nephropathy  7  Diabetes mellitus type 1           Thank you for allowing us to participate in the care of your patient       Please feel free to contact us regarding the care of this patient, or any other questions/concerns that may be applicable  Patient Active Problem List   Diagnosis    Osteomyelitis ([place] [address] )    Foot pain    Type 1 diabetes mellitus ([place] [address] )    Renal transplant, status post    Sepsis ([place] [address] )    [facility]    Osteomyelitis [facility]  )    Poor circulation    Closed fracture of distal end of right tibia with routine healing    Chronic kidney disease, stage IV (severe) ([place] [address] )    Chronic osteomyelitis of tibia ([place] [address] )    Immunosuppression ([place] [address] )    Hypertension    [facility]acidoses[facility]    Elevated troponin    Diarrhea    Cellulitis of ankle    Non-ST elevation myocardial infarction (NSTEMI), type 2    Urinary retention    Severe sepsis ([facility])    Acute renal failure (ARF) ([facility])    Acute kidney injury superimposed on CKD ([facility])    Metabolic acidosis    Hyperphosphatemia    Gastrointestinal hemorrhage    Bacteremia    S/P AKA (above knee amputation), right ([facility])    Acute blood loss anemia    Acute respiratory failure with hypoxia ([facility])    Pulmonary hypertension ([facility])    Chronic anemia    Depressed left ventricular ejection fraction    Acute pulmonary edema ([facility])    Hx of right BKA ([facility])    Hypertensive urgency    ESRD (end stage renal disease) on dialysis ([place] [address] )    Coronary artery disease involving native coronary artery of native heart without angina pectoris    Pre-operative cardiovascular examination    Chronic kidney disease, unspecified    Lesion of pancreas    Abnormal CT scan, colon    Respiratory distress       History of Present Illness   Physician Requesting Consult: Li Cote MD  Reason for Consult / Principal Problem:  End-stage renal disease   Hx and PE limited by:   HPI: Nicolasa Story is a 47y o  year old male who presented to the emergency department earlier this morning with complaints of hypoxia    Patient was at home and usual state health the day prior, 1 to work, her work he has no interaction with other persons, and then went back home with no specific complaints  Yesterday evening, he noted that he was somewhat short of breath, checked his pulse ox was noted to be in the 80s, he took some deep breaths in immediately improved up into the 90s  Shortly after that he once again noted to be dyspneic with conversation reassessed his pulse ox and now was down to the 70s  At that time EMS was called and present to the emergency room for further evaluation  Patient denies fevers/chills/cough at this time  He is immunocompromised due to immunosuppression from a prior kidney transplant  In the emergency department, he was noted to have elevated blood pressures as well as an exam consistent with congestive heart failure is placed on nitroglycerin drip, he remains on nitroglycerin drip currently in the intensive care unit  Patient denies specific complaints at this time, he currently has oxygen via nasal cannula  He denies chest pain or pressure  Patient was given a dose of vancomycin and placed on piperacillin tazobactam for broad-spectrum coverage  As of this dictation, patient's COVID-23 remains pending  From the renal standpoint, the patient has end-stage renal disease and is on hemodialysis sessions Tuesday Thursday Saturday  From a volume standpoint, patient typically does a good job restrict fluid and really needs excess ultrafiltration with hemodialysis sessions  At presentation, he was given 40 mg of IV Lasix with no documentation of urine output  Prior medical charts reviewed during the course this consultation    History obtained from chart review and the patient    Review of Systems - Negative except as mentioned above in HPI, more specifics as mentioned below    Review of Systems - General ROS: positive for  - fatigue  Psychological ROS: negative  Ophthalmic ROS: negative  ENT ROS: negative  Allergy and Immunology ROS: negative  Hematological and Lymphatic ROS: negative  Endocrine ROS: negative  Respiratory ROS: positive for - shortness of breath  Cardiovascular ROS: negative  Gastrointestinal ROS: no abdominal pain, change in bowel habits, or black or bloody stools  Genito-Urinary ROS: no dysuria, trouble voiding, or hematuria  Musculoskeletal ROS: negative  Neurological ROS: no TIA or stroke symptoms  Dermatological ROS: negative    Historical Information   Past Medical History:   Diagnosis Date    Bacteremia 12/21/2018    CAD (coronary artery disease)     s/p MARC to LCx, pLAD 2/2018    [facility] arrest ([facility] )     Chronic kidney disease     Diabetes mellitus type 1 ([place] [address] )     [facility]     Access in right chest    GI bleed     Hyperlipidemia     Hypertension     Infection at site of external fixator pin ([place] [address] )     MI (myocardial infarction) ([place] [address] )     Pneumonia     Last Assessed [id number]    Renal failure     Renal transplant, status post 07/21/2007     Past Surgical History:   Procedure Laterality Date    AMPUTATION Right 02/2019    aboce knee    ANKLE FRACTURE SURGERY      ANKLE HARDWARE REMOVAL Right 7/31/2017    Procedure: REMOVAL HARDWARE ANKLE;  Surgeon: Princess Be MD;  Location: [facility];  Service: Orthopedics    ANKLE HARDWARE REMOVAL Right 8/17/2017    Procedure: TIBIA FAILED HARDWARE REMOVAL;  Surgeon: Princess Be MD;  [facility]: [facility];  Service: Orthopedics    CLOSED REDUCTION ANKLE Right 7/3/2017    Procedure: CLOSED REDUCTION DISTAL TIB-FIB AND CASTING VS;  Surgeon: Princess Be MD;  [facility]: [facility];  Service: Orthopedics    CORONARY ANGIOPLASTY WITH STENT PLACEMENT  02/2018    CAD s/p MARC to LCx, pLAD    ESOPHAGOGASTRODUODENOSCOPY N/A 12/20/2018    Procedure: ESOPHAGOGASTRODUODENOSCOPY (EGD) in ICU; Surgeon: Aleida Rowland MD;  Location: [facility];   Service: Gastroenterology    EYE SURGERY      FRACTURE SURGERY      ORIF Rt Ankle    GLUTAMIC ACID DECARBOXYLASE (HISTORICAL)      IR AV FISTULAGRAM/GRAFTOGRAM  4/16/2020    IR PICC LINE  8/20/2018    IR TUNNELED DIALYSIS CATHETER CHECK/CHANGE/REPOSITION/ANGIOPLASTY  1/8/2020    IR TUNNELED DIALYSIS CATHETER PLACEMENT  10/21/2019    IR TUNNELED DIALYSIS CATHETER REMOVAL  5/29/2020    IR VENOUS LINE REMOVAL  10/5/2018    LEG AMPUTATION Right 02/01/2019    Above the knee    NEPHRECTOMY TRANSPLANTED ORGAN      IA ANASTOMOSIS,AV,ANY SITE Right 2/11/2020    Procedure: CREATION FISTULA ARTERIOVENOUS (AV) right upper extremity;  Surgeon: Zina Lopez MD;  [facility] MA[facility] OR;  Service: Vascular    IA OPEN TREATMENT FRACTURE DISTAL TIBIA FIBULA Right 7/3/2017    Procedure: OPEN REDUCTION W/ INTERNAL FIXATION (ORIF);   Surgeon: Jonathan Vega MD;  Location: [facility];  Service: Orthopedics    TOE AMPUTATION Right 10/27/2016    Procedure: AMPUTATION TOE;  Surgeon: Jennifer Barrow DPM;  Location: [facility];  Service:      Social History   Social History     Substance and Sexual Activity   Alcohol Use Not Currently     Social History     Substance and Sexual Activity   Drug Use Never     Social History     Tobacco Use   Smoking Status Never Smoker   Smokeless Tobacco Never Used     Family History   Problem Relation Age of Onset    Diabetes Brother     Coronary artery disease Mother     No Known Problems Father        Meds/Allergies   all current active meds have been reviewed, current meds:   Current Facility-Administered Medications   Medication Dose Route Frequency    albuterol inhalation solution 2 5 mg  2 5 mg Nebulization Q4H PRN    aspirin chewable tablet 81 mg  81 mg Oral QAM    atorvastatin (LIPITOR) tablet 80 mg  80 mg Oral After Dinner    azaTHIOprine (IMURAN) tablet 50 mg  50 mg Oral QAM    calcium carbonate (TUMS) chewable tablet 500 mg  500 mg Oral BID    cholecalciferol (VITAMIN D3) tablet 1,000 Units  1,000 Units Oral QAM    furosemide (LASIX) injection 40 mg  40 mg Intravenous BID (diuretic)    heparin (porcine) subcutaneous injection 5,000 Units  5,000 Units Subcutaneous Q8H [address]    insulin detemir (LEVEMIR) subcutaneous injection 7 Units  7 Units Subcutaneous BID    insulin lispro (HumaLOG) 100 units/mL subcutaneous injection 1-5 Units  1-5 Units Subcutaneous TID AC    metoprolol succinate (TOPROL-XL) 24 hr tablet 50 mg  50 mg Oral QAM    NIFEdipine (PROCARDIA XL) 24 hr tablet 90 mg  90 mg Oral Daily    nitroGLYcerin (TRIDIL) 50 mg in 250 mL infusion (premix)  100 mcg/min Intravenous Titrated    ondansetron (ZOFRAN) injection 4 mg  4 mg Intravenous Q4H PRN    pantoprazole (PROTONIX) EC tablet 40 mg  40 mg Oral Early Morning    piperacillin-tazobactam (ZOSYN) 2 25 g in sodium chloride 0 9 % 50 mL IVPB  2 25 g Intravenous Q8H    predniSONE tablet 5 mg  5 mg Oral Daily    sodium chloride (PF) 0 9 % injection 3 mL  3 mL Intravenous Q1H PRN    tacrolimus (PROGRAF) capsule 2 mg  2 mg Oral HS    tacrolimus (PROGRAF) capsule 3 mg  3 mg Oral QAM    and PTA meds:    Medications Prior to Admission   Medication    insulin detemir (LEVEMIR) 100 units/mL subcutaneous injection    insulin lispro (HumaLOG) 100 units/mL injection    albuterol (2 5 mg/3 mL) 0 083 % nebulizer solution    aspirin 81 mg chewable tablet    atorvastatin (LIPITOR) 80 mg tablet    azaTHIOprine (IMURAN) 50 mg tablet    calcium carbonate (TUMS) 500 mg chewable tablet    carvedilol (COREG) 6 25 mg tablet    Cholecalciferol 25 MCG (1000 UT) tablet    glucagon (GLUCAGON EMERGENCY) 1 MG injection    Lancets (ONETOUCH DELICA PLUS [id number]) MISC    metoprolol succinate (TOPROL-XL) 50 mg 24 hr tablet    NIFEdipine (PROCARDIA XL) 90 mg 24 hr tablet    ondansetron (ZOFRAN-ODT) 4 mg disintegrating tablet    ONE TOUCH ULTRA TEST test strip    pantoprazole (PROTONIX) 40 mg tablet    predniSONE 5 mg tablet    tacrolimus (PROGRAF) 1 mg capsule    tacrolimus (PROGRAF) 1 mg capsule         No Known Allergies    Objective     Intake/Output Summary (Last 24 hours) at 8/25/2020 9117  Last data filed at 8/25/2020 0600  Gross per 24 hour   Intake 286 03 ml   Output    Net 286 03 ml       Invasive Devices:        Physical Exam      I/O last 3 completed shifts: In: 286 [P O :30; I V :156; IV Piggyback:100]  Out: -     Vitals:    08/25/20 0820   BP: 131/58   Pulse: 74   Resp:    Temp:    SpO2:        Gen: in NAD, Alert/Awake  HEENT: no sclerous icterus, MMM, neck supple  CV: +S1/S2, RRR  Lungs:  Reduced bilaterally  Abd: +BS, soft NT/ND  Ext: all four extremities are warm, trace bilateral lower extremity edema, right lower extremity stump intact  Skin: no rashes noted  Neuro: CN II-XII intact    Current Weight: Weight - Scale: 50 8 kg (112 lb)  First Weight: Weight - Scale: 53 8 kg (118 lb 9 7 oz)    Lab Results:  I have personally reviewed pertinent labs      CBC:   Lab Results   Component Value Date    WBC 11 78 (H) 08/25/2020    HGB 9 8 (L) 08/25/2020    HCT 30 4 (L) 08/25/2020    MCV 95 08/25/2020     08/25/2020    MCH 30 6 08/25/2020    MCHC 32 2 08/25/2020    RDW 12 9 08/25/2020    MPV 8 7 (L) 08/25/2020    NRBC 0 08/25/2020     CMP:   Lab Results   Component Value Date    K 5 1 08/25/2020    CL 96 (L) 08/25/2020    CO2 22 08/25/2020    BUN 63 (H) 08/25/2020    CREATININE 7 48 (H) 08/25/2020    CALCIUM 8 4 08/25/2020    AST 12 08/25/2020    ALT 11 (L) 08/25/2020    ALKPHOS 183 (H) 08/25/2020    EGFR 8 08/25/2020     Phosphorus: No results found for: PHOS  Magnesium:   Lab Results   Component Value Date    MG 3 2 (H) 08/25/2020     Urinalysis: No results found for: Isaura Perks, SPECGRAV, PHUR, LEUKOCYTESUR, NITRITE, PROTEINUA, GLUCOSEU, KETONESU, BILIRUBINUR, BLOODU  Ionized Calcium: No results found for: CAION  Coagulation: No results found for: PT, INR, APTT  Troponin:   Lab Results   Component Value Date    TROPONINI <0 02 08/25/2020     ABG: No results found for: PHART, JBW2LPH, PO2ART, KPP8RDE, V0DLRWHQ, BEART, SOURCE    Results from last 7 days   Lab Units 08/25/20  0528 08/25/20  0043 POTASSIUM mmol/L 5 1 5 2   CHLORIDE mmol/L 96* 94*   CO2 mmol/L 22 24   BUN mg/dL 63* 61*   CREATININE mg/dL 7 48* 7 27*   CALCIUM mg/dL 8 4 8 6   ALK PHOS U/L 183*  --    ALT U/L 11*  --    AST U/L 12  --        Radiology review:  Procedure: X-ray Chest 1 View Portable    Result Date: 8/25/2020  Narrative: CHEST INDICATION:   Shortness of breath  Patient has suspected COVID-19  COMPARISON:  12/1/2019 and 6/9/2020  EXAM PERFORMED/VIEWS:  XR CHEST PORTABLE FINDINGS: Dense consolidation at the right lower lung field  Lack of silhouetting of the cardiac border implies lower lobe location  Patchy air opacities demonstrated peripherally in the left lateral lower lung field  Effusion on either side  No pneumothorax  Heart is mildly enlarged but unchanged  Pulmonary vasculature is partially obscured  Question mild cephalization  Bones are unremarkable  Impression: 1  Dense right lower lobe pneumonia  Patchy opacities in the left could represent other foci of infection  2   Question background pulmonary vascular congestion though focal distribution of consolidations speaks against alveolar edema  Note: I agree with the preliminary interpretation by the ED care provider documented in Epic  Workstation performed: [id number]         Sandy Cruz DO      This consultation note was produced in part using a dictation device which may document imprecise wording from author's original intent

## 2020-08-25 NOTE — H&P
H&P- Debbie Reed 1970, 47 y o  male MRN: [id number]    Unit/Bed#:  Encounter: [id number]    Primary Care Provider: Jessica Olivier DO   Date and time admitted to hospital: 8/25/2020 12:38 AM             [facility] - History and Physical:       Debbie Reed 47 y o  male MRN: [id number]  Unit/Bed#:  Encounter: [id number]  Admitting Physician: Flaquita Bradley MD  PCP: Jessica Olivier DO  Date of Admission:  08/25/20        Assessment and Plan:     * Respiratory distress  Assessment & Plan  Secondary to acute pulmonary edema with concomitant pneumonia  · Admit to the ICU  · Consult to Critical Care placed  · ED physician discussed with critical care and recommended BiPAP for now and Critical Care was agreeable to nitroglycerin drip  · Will also start IV Lasix b i d     The patient stated that he makes urine  · Will order IV Zosyn to cover for possible pneumonia    Acute pulmonary edema (HCC)  Assessment & Plan  · Continue BiPAP  · Will start IV Lasix b i d   · Patient is end-stage renal disease on hemodialysis and also has underlying CHF    ESRD (end stage renal disease) on dialysis [facility])  Assessment & Plan  · Consult to Nephrology placed    Hypertensive urgency  Assessment & Plan  Related to respiratory distress  · Currently on nitroglycerin drip  · Continue metoprolol and Procardia    Depressed left ventricular ejection fraction  Assessment & Plan  Noted  · Will order 2D echocardiogram to evaluate    S/P AKA (above knee amputation), right [facility])  Assessment & Plan  Noted    Renal transplant, status post  Assessment & Plan  · Continue azathioprine, tacrolimus and prednisone    Type 1 diabetes mellitus ([facility] [address] )  Assessment & Plan  Lab Results   Component Value Date    HGBA1C 4 8 10/11/2019       No results for input(s): POCGLU in the last 72 hours      Blood Sugar Average: Last 72 hrs:     · Continue Levemir  · Will order insulin sliding scale            VTE Prophylaxis: Heparin / sequential compression device   Code Status: full      Anticipated Length of Stay:  Patient will be admitted on an Inpatient basis with an anticipated length of stay of  2 midnights  Justification for Hospital Stay:  Respiratory distress    Total Time for Visit, including Counseling / Coordination of Care: 30 minutes  Greater than 50% of this total time spent on direct patient counseling and coordination of care  Chief Complaint:   Shortness of breath    History of Present Illness:    Lakshmi Lynn is a 47 y o  male who presented to the ED for sudden onset shortness of breath  Approximately 1 hour prior to arrival to the ED, the patient started to experience sudden-onset shortness of breath and EMS was subsequently called  it was noted that he had crackles and he was given nitroglycerin paste and 2 g of IV magnesium  In the ED he was noted to be hypoxic and he was immediately placed on BiPAP  His oxygen saturations to the mid to upper 90s  ED physician discussed the case with Critical Care and it was recommended that the patient be admitted to the ICU on nitroglycerin drip and BiPAP  The patient already feels better after being placed on BiPAP  He denies any chest pain/nausea/vomiting/fevers/chills  Review of Systems:    Review of Systems   Constitutional: Negative  HENT: Negative  Eyes: Negative  Respiratory: Positive for shortness of breath  Cardiovascular: Negative  Gastrointestinal: Negative  Genitourinary: Negative  Musculoskeletal: Negative  Skin: Negative  Neurological: Negative  Psychiatric/Behavioral: Negative          Past Medical and Surgical History:     Past Medical History:   Diagnosis Date    Bacteremia 12/21/2018    CAD (coronary artery disease)     s/p MARC to LCx, pLAD 2/2018    Cardiac arrest ([facility] [address] )     Chronic kidney disease     [facility]     [facility]ialysis patient [facility])     Access in right chest    GI bleed     Hyperlipidemia     Hypertension     Infection at site of external fixator pin ([facility] [address] )     MI (myocardial infarction) ([facility] [address] )     Pneumonia     Last Assessed [id number]    Renal failure     Renal transplant, status post 07/21/2007       Past Surgical History:   Procedure Laterality Date    AMPUTATION Right 02/2019    aboce knee    ANKLE FRACTURE SURGERY      ANKLE HARDWARE REMOVAL Right 7/31/2017    Procedure: REMOVAL HARDWARE ANKLE;  Surgeon: Jeanna Ontiveros MD;  Location: [facility];  Service: Orthopedics    ANKLE HARDWARE REMOVAL Right 8/17/2017    Procedure: TIBIA FAILED HARDWARE REMOVAL;  Surgeon: Jeanna Ontiveros MD;  Location: [facility];  Service: Orthopedics    CLOSED REDUCTION ANKLE Right 7/3/2017    Procedure: CLOSED REDUCTION DISTAL TIB-FIB AND CASTING VS;  Surgeon: Jeanna Ontiveros MD;  Location: [facility];  Service: Orthopedics    CORONARY ANGIOPLASTY WITH STENT PLACEMENT  02/2018    CAD s/p MARC to LCx, pLAD    ESOPHAGOGASTRODUODENOSCOPY N/A 12/20/2018    Procedure: ESOPHAGOGASTRODUODENOSCOPY (EGD) in ICU; Surgeon: Oscar Carmona MD;  Location: [facility]; Service: Gastroenterology    EYE SURGERY      FRACTURE SURGERY      ORIF Rt Ankle    GLUTAMIC ACID DECARBOXYLASE (HISTORICAL)      IR AV FISTULAGRAM/GRAFTOGRAM  4/16/2020    IR PICC LINE  8/20/2018    IR TUNNELED DIALYSIS CATHETER CHECK/CHANGE/REPOSITION/ANGIOPLASTY  1/8/2020    IR TUNNELED DIALYSIS CATHETER PLACEMENT  10/21/2019    IR TUNNELED DIALYSIS CATHETER REMOVAL  5/29/2020    IR VENOUS LINE REMOVAL  10/5/2018    LEG AMPUTATION Right 02/01/2019    Above the knee    NEPHRECTOMY TRANSPLANTED ORGAN      IL ANASTOMOSIS,AV,ANY SITE Right 2/11/2020    Procedure: CREATION FISTULA ARTERIOVENOUS (AV) right upper extremity;  Surgeon: Elena Hdez MD;  Location: [address] [place] [address] MAIN OR;  Service: Vascular    IL OPEN TREATMENT FRACTURE DISTAL TIBIA FIBULA Right 7/3/2017    Procedure: OPEN REDUCTION W/ INTERNAL FIXATION (ORIF);   Surgeon: Beverly Kessler Jocy Najera MD;  Location: [facility];  Service: Orthopedics    TOE AMPUTATION Right 10/27/2016    Procedure: AMPUTATION TOE;  Surgeon: Rober Ascencio DPM;  Location: [facility];  Service:        Meds/Allergies:    Prior to Admission medications    Medication Sig Start Date End Date Taking?  Authorizing Provider   insulin detemir (LEVEMIR) 100 units/mL subcutaneous injection Inject 8 Units under the skin 2 (two) times a day    Yes Historical Provider, MD   insulin lispro (HumaLOG) 100 units/mL injection Inject 10 Units under the skin as needed    Yes Historical Provider, MD   albuterol (2 5 mg/3 mL) 0 083 % nebulizer solution Inhale 2 5 mg every 4 (four) hours as needed  2/8/19   Historical Provider, MD   aspirin 81 mg chewable tablet Chew 81 mg every morning     Historical Provider, MD   atorvastatin (LIPITOR) 80 mg tablet Take 80 mg by mouth every morning     Historical Provider, MD   azaTHIOprine (IMURAN) 50 mg tablet Take 1 tablet (50 mg total) by mouth every morning 8/18/20   Shereen Negrete DO   calcium carbonate (TUMS) 500 mg chewable tablet Chew 500 mg 2 (two) times a day     Historical Provider, MD   carvedilol (COREG) 6 25 mg tablet Take 1 tablet (6 25 mg total) by mouth 2 (two) times a day with meals 8/18/20   Shereen Negrete DO   Cholecalciferol 25 MCG (1000 UT) tablet Take 1 tablet (1,000 Units total) by mouth daily  Patient taking differently: Take 1,000 Units by mouth every morning  11/19/19   Shereen Negrete DO   glucagon (GLUCAGON EMERGENCY) 1 MG injection Inject 1 mg under the skin once as needed for low blood sugar for up to 1 dose 10/18/19   Shereen Negrete DO   Lancets ([address]) [address] [facility]  11/19/19   Historical Provider, MD   metoprolol succinate (TOPROL-XL) 50 mg 24 hr tablet Take 1 tablet (50 mg total) by mouth every morning 6/18/20   Shereen Negrete DO   NIFEdipine (PROCARDIA XL) 90 mg 24 hr tablet Take 1 tablet (90 mg total) by mouth daily 8/18/20   Shereen Negrete DO   ondansetron (ZOFRAN-ODT) 4 mg disintegrating tablet Take 1 tablet (4 mg total) by mouth every 6 (six) hours as needed for vomiting 6/9/20   Neema Sanchez, DO   ONE TOUCH ULTRA TEST test strip CHECK BLOOD SUGAR 5 TO 6 TIMES DAILY 11/19/19   Romana Cartwright, DO   pantoprazole (PROTONIX) 40 mg tablet Take 1 tablet (40 mg total) by mouth daily 8/18/20   Romana Cartwright, DO   predniSONE 5 mg tablet Take 1 tablet (5 mg total) by mouth daily 4/14/20   Romana Cartwright, DO   tacrolimus (PROGRAF) 1 mg capsule Take 3 mg by mouth every morning    Historical Provider, MD   tacrolimus (PROGRAF) 1 mg capsule Take 2 mg by mouth daily at bedtime    Historical Provider, MD   hydrALAZINE (APRESOLINE) 50 mg tablet Take 1 tablet (50 mg total) by mouth every 8 (eight) hours 11/7/19 8/25/20  Romana Cartwright, DO   polyethylene glycol-electrolytes (TriLyte) 4000 mL solution Take 4,000 mL by mouth once for 1 dose 6/22/20 8/25/20  Charissa Roman MD     I have reviewed home medications with patient personally  Allergies: No Known Allergies    Social History:     Marital Status: /Civil Union     Social History     Substance and Sexual Activity   Alcohol Use Not Currently     Social History     Tobacco Use   Smoking Status Never Smoker   Smokeless Tobacco Never Used     Social History     Substance and Sexual Activity   Drug Use Never       Family History:    non-contributory    Physical Exam:     Vitals:   Blood Pressure: (!) 171/72 (08/25/20 0242)  Pulse: 72 (08/25/20 0242)  Temperature: 98 1 °F (36 7 °C) (08/25/20 0242)  Temp Source: Temporal (08/25/20 0242)  Respirations: (!) 30 (08/25/20 0242)  Height: 5' 4" (162 6 cm) (08/25/20 0242)  Weight - Scale: 50 8 kg (112 lb) (08/25/20 0242)  SpO2: 95 % (08/25/20 0242)    Physical Exam  Constitutional:       Appearance: Normal appearance  HENT:      Head: Normocephalic and atraumatic  Eyes:      Extraocular Movements: Extraocular movements intact  Pupils: Pupils are equal, round, and reactive to light  Neck:      Musculoskeletal: Neck supple  Cardiovascular:      Rate and Rhythm: Normal rate and regular rhythm  Pulmonary:      Comments: Crackles  Abdominal:      General: Bowel sounds are normal       Palpations: Abdomen is soft  Musculoskeletal:      Comments: Right below-the-knee amputation   Skin:     General: Skin is warm and dry  Neurological:      Mental Status: He is alert and oriented to person, place, and time  Psychiatric:         Mood and Affect: Mood normal          Behavior: Behavior normal          Additional Data:     Lab Results: I have personally reviewed pertinent reports  Results from last 7 days   Lab Units 08/25/20  0043   WBC Thousand/uL 8 55   HEMOGLOBIN g/dL 11 0*   HEMATOCRIT % 33 4*   PLATELETS Thousands/uL 271   NEUTROS PCT % 78*   LYMPHS PCT % 15   MONOS PCT % 4   EOS PCT % 1     Results from last 7 days   Lab Units 08/25/20  0043   SODIUM mmol/L 128*   POTASSIUM mmol/L 5 2   CHLORIDE mmol/L 94*   CO2 mmol/L 24   BUN mg/dL 61*   CREATININE mg/dL 7 27*   ANION GAP mmol/L 10   CALCIUM mg/dL 8 6   GLUCOSE RANDOM mg/dL 412*                       Imaging: I have personally reviewed pertinent reports  X-ray chest 1 view portable   ED Interpretation by Biju Joseph MD (08/25 0101)   Right lower lobe pneumonia as interpreted by myself  Allscripts / [email or web address] Records Reviewed: Yes     ** Please Note: This note has been constructed using a voice recognition system   **

## 2020-08-25 NOTE — ASSESSMENT & PLAN NOTE
Related to respiratory distress  · Currently on nitroglycerin drip  · Continue metoprolol and Procardia

## 2020-08-25 NOTE — ASSESSMENT & PLAN NOTE
Secondary to acute pulmonary edema with concomitant pneumonia  · Admit to the ICU  · Consult to Critical Care placed  · ED physician discussed with critical care and recommended BiPAP for now and Critical Care was agreeable to nitroglycerin drip  · Will also start IV Lasix b i d     The patient stated that he makes urine  · Will order IV Zosyn to cover for possible pneumonia

## 2020-08-25 NOTE — RESPIRATORY THERAPY NOTE
BIPAP therapy     Patient came in via ambulance into the ED  Patient has increased work of breathing, oxygen saturations were low  He was already on CPAP therapy from the ambulance  Placed him on BIPAP therapy, patient seems more comfortable on the therapy      2:44 am, patient transported from the ED to ICU without incident,     5:45 am, patient now off bipap and on 4 lpm nasal oxygen ox sat 92%, he is resting very comfortably  Patient rang for the nurse to come off the bipap machine   Dr Damian Mason notified

## 2020-08-25 NOTE — CASE MANAGEMENT
Pt lives with his wife in a ranch home  Pt has 0 DENNIS  Pt has a walker, cane, bedside commode , shower chair and glucometer  Pt is independent with ADL's  Pt's wife does the cooking and cleaning  Pt's wife drives him to appointments  Pt gets dialysis at the [place] [address] in [facility] on Tuesday , Thursday and Saturday  Pt has had Rekha KENNEDYA in the past  Pt's PCP is Dr Tiara Bustillo  Pt's cardiologist is Dr Brea Mchugh  Pt uses the [facility] in [facility]  Pt was given the discharge check list  List was reviewed with a good understanding  I will continue to follow for any Case Manangement needs

## 2020-08-25 NOTE — ED PROVIDER NOTES
History  Chief Complaint   Patient presents with    Respiratory Distress     This is a 50-year-old male with a history of chronic kidney disease status post renal transplant on tacrolimus on dialysis on [facility], diabetes, hypertension, CHF who presents with sudden onset shortness of breath  Approximately 1 hour prior to arrival, the patient started to experience sudden-onset shortness of breath  EMS was called  The patient was hypertensive with crackles throughout the lungs  He was given 1 in of nitroglycerin paste  He was given 2 g of magnesium  He was placed on CPAP  He was ultimately brought to the hospital   On arrival, the patient was saturating in the mid to upper 80s on CPAP  He was immediately placed on BiPAP with improvement of his oxygen saturations to the mid to upper 90s  The patient was very hypertensive with crackles throughout  I placed him on a nitroglycerin drip  Patient denies any other symptoms except shortness of breath  Denies fever/chills, nausea/vomiting, lightheadedness/dizziness, numbness/weakness, headache, change in vision, URI symptoms, neck pain, chest pain, palpitations, cough, back pain, flank pain, abdominal pain, diarrhea, hematochezia, melena, dysuria, hematuria  Prior to Admission Medications   Prescriptions Last Dose Informant Patient Reported? Taking?    Cholecalciferol 25 MCG (1000 UT) tablet  Self No No   Sig: Take 1 tablet (1,000 Units total) by mouth daily   Patient taking differently: Take 1,000 Units by mouth every morning    Lancets (ONETOUCH DELICA PLUS ALRUOR22X) MISC  Self Yes No   NIFEdipine (PROCARDIA XL) 90 mg 24 hr tablet   No No   Sig: Take 1 tablet (90 mg total) by mouth daily   ONE TOUCH ULTRA TEST test strip  Self No No   Sig: CHECK BLOOD SUGAR 5 TO 6 TIMES DAILY   albuterol (2 5 mg/3 mL) 0 083 % nebulizer solution  Self Yes No   Sig: Inhale 2 5 mg every 4 (four) hours as needed    aspirin 81 mg chewable tablet  Self Yes No   Sig: Chew 81 mg every morning    atorvastatin (LIPITOR) 80 mg tablet  Self Yes No   Sig: Take 80 mg by mouth every morning    azaTHIOprine (IMURAN) 50 mg tablet   No No   Sig: Take 1 tablet (50 mg total) by mouth every morning   calcium carbonate (TUMS) 500 mg chewable tablet  Self Yes No   Sig: Chew 500 mg 2 (two) times a day    carvedilol (COREG) 6 25 mg tablet   No No   Sig: Take 1 tablet (6 25 mg total) by mouth 2 (two) times a day with meals   glucagon (GLUCAGON EMERGENCY) 1 MG injection  Self No No   Sig: Inject 1 mg under the skin once as needed for low blood sugar for up to 1 dose   insulin detemir (LEVEMIR) 100 units/mL subcutaneous injection  Self Yes No   Sig: Inject 7 Units under the skin 2 (two) times a day   insulin lispro (HumaLOG) 100 units/mL injection  Self Yes No   Sig: Inject 10 Units under the skin as needed    metoprolol succinate (TOPROL-XL) 50 mg 24 hr tablet  Self No No   Sig: Take 1 tablet (50 mg total) by mouth every morning   ondansetron (ZOFRAN-ODT) 4 mg disintegrating tablet  Self No No   Sig: Take 1 tablet (4 mg total) by mouth every 6 (six) hours as needed for vomiting   pantoprazole (PROTONIX) 40 mg tablet   No No   Sig: Take 1 tablet (40 mg total) by mouth daily   predniSONE 5 mg tablet  Self No No   Sig: Take 1 tablet (5 mg total) by mouth daily   tacrolimus (PROGRAF) 1 mg capsule  Self Yes No   Sig: Take 3 mg by mouth every morning   tacrolimus (PROGRAF) 1 mg capsule  Self Yes No   Sig: Take 2 mg by mouth daily at bedtime      Facility-Administered Medications: None       Past Medical History:   Diagnosis Date    Bacteremia 12/21/2018    CAD (coronary artery disease)     s/p MARC to LCx, pLAD 2/2018    Cardiac arrest (HCC)     Chronic kidney disease     Diabetes mellitus type 1 (HCC)     Dialysis patient ([facility] )     Access in right chest    GI bleed     Hyperlipidemia     Hypertension     Infection at site of external fixator pin ([facility] )     MI (myocardial infarction) ([facility] )     Pneumonia Last Assessed [id number]    Renal failure     Renal transplant, status post 07/21/2007       Past Surgical History:   Procedure Laterality Date    AMPUTATION Right 02/2019    aboce knee    ANKLE FRACTURE SURGERY      ANKLE HARDWARE REMOVAL Right 7/31/2017    Procedure: REMOVAL HARDWARE ANKLE;  Surgeon: Elva Pelaez MD;  [facility]: [facility];  Service: Orthopedics    ANKLE HARDWARE REMOVAL Right 8/17/2017    Procedure: TIBIA FAILED HARDWARE REMOVAL;  Surgeon: Elva Pelaez MD;  [facility]: [facility];  Service: Orthopedics    CLOSED REDUCTION ANKLE Right 7/3/2017    Procedure: CLOSED REDUCTION DISTAL TIB-FIB AND CASTING VS;  Surgeon: Elva Pelaez MD;  [facility]: [facility];  Service: Orthopedics    CORONARY ANGIOPLASTY WITH STENT PLACEMENT  02/2018    CAD s/p MARC to LCx, pLAD    ESOPHAGOGASTRODUODENOSCOPY N/A 12/20/2018    Procedure: ESOPHAGOGASTRODUODENOSCOPY (EGD) in ICU; Surgeon: Corky King MD;  Location: [facility]; Service: Gastroenterology    EYE SURGERY      FRACTURE SURGERY      ORIF Rt Ankle    GLUTAMIC ACID DECARBOXYLASE (HISTORICAL)      IR AV FISTULAGRAM/GRAFTOGRAM  4/16/2020    IR PICC LINE  8/20/2018    IR TUNNELED DIALYSIS CATHETER CHECK/CHANGE/REPOSITION/ANGIOPLASTY  1/8/2020    IR TUNNELED DIALYSIS CATHETER PLACEMENT  10/21/2019    IR TUNNELED DIALYSIS CATHETER REMOVAL  5/29/2020    IR VENOUS LINE REMOVAL  10/5/2018    LEG AMPUTATION Right 02/01/2019    Above the knee    NEPHRECTOMY TRANSPLANTED ORGAN      CO ANASTOMOSIS,AV,ANY SITE Right 2/11/2020    Procedure: CREATION FISTULA ARTERIOVENOUS (AV) right upper extremity;  Surgeon: Wanda Vieyra MD;  Location: [facility] MAIN OR;  Service: Vascular    CO OPEN TREATMENT FRACTURE DISTAL TIBIA FIBULA Right 7/3/2017    Procedure: OPEN REDUCTION W/ INTERNAL FIXATION (ORIF);   Surgeon: Elva Pelaez MD;  [facility]: [facility];  Service: Orthopedics    TOE AMPUTATION Right 10/27/2016    Procedure: AMPUTATION TOE;  Surgeon: Tiesha Cloud Anabelle Jordan DPM;  Location: [facility];  Service:        Family History   Problem Relation Age of Onset    Diabetes Brother     Coronary artery disease Mother     No Known Problems Father      I have reviewed and agree with the history as documented  E-Cigarette/Vaping    E-Cigarette Use Never User      E-Cigarette/Vaping Substances    Nicotine No     THC No     CBD No     Flavoring No     Other No     Unknown No      Social History     Tobacco Use    Smoking status: Never Smoker    Smokeless tobacco: Never Used   Substance Use Topics    Alcohol use: Not Currently    Drug use: Never       Review of Systems   Constitutional: Negative for chills, fatigue and fever  HENT: Negative for rhinorrhea, sore throat and trouble swallowing  Eyes: Negative for photophobia and visual disturbance  Respiratory: Positive for shortness of breath  Negative for cough and chest tightness  Cardiovascular: Negative for chest pain, palpitations and leg swelling  Gastrointestinal: Negative for abdominal pain, blood in stool, diarrhea, nausea and vomiting  Endocrine: Negative for polyuria  Genitourinary: Negative for dysuria, flank pain and hematuria  Musculoskeletal: Negative for back pain and neck pain  Skin: Negative for color change and rash  Allergic/Immunologic: Negative for immunocompromised state  Neurological: Negative for dizziness, weakness, light-headedness, numbness and headaches  All other systems reviewed and are negative  Physical Exam  Physical Exam  Constitutional:       General: He is in acute distress  Appearance: He is well-developed  Comments: Chronically ill-appearing male  HENT:      Mouth/Throat:      Pharynx: Uvula midline  Eyes:      General: Lids are normal       Conjunctiva/sclera: Conjunctivae normal       Pupils: Pupils are equal, round, and reactive to light  Neck:      Thyroid: No thyroid mass or thyromegaly        Trachea: Trachea normal  Cardiovascular:      Rate and Rhythm: Normal rate and regular rhythm  Pulses: Normal pulses  Heart sounds: Normal heart sounds  No murmur  Pulmonary:      Effort: Tachypnea, accessory muscle usage and respiratory distress present  Breath sounds: Rales present  Comments: Crackles throughout  Abdominal:      General: Bowel sounds are normal       Palpations: Abdomen is soft  Tenderness: There is no abdominal tenderness  There is no guarding or rebound  Negative signs include Castanon's sign  Musculoskeletal:      Comments: Right AKA  Fistula right upper extremity with a palpable thrill  Skin:     General: Skin is warm and dry  Neurological:      Mental Status: He is alert  Psychiatric:         Speech: Speech normal          Behavior: Behavior normal  Behavior is cooperative  Thought Content:  Thought content normal          Vital Signs  ED Triage Vitals   Temperature Pulse Respirations Blood Pressure SpO2   08/25/20 0040 08/25/20 0040 08/25/20 0040 08/25/20 0040 08/25/20 0030   97 5 °F (36 4 °C) 80 18 (!) 236/105 96 %      Temp Source Heart Rate Source Patient Position - Orthostatic VS BP Location FiO2 (%)   08/25/20 0040 08/25/20 0040 08/25/20 0040 08/25/20 0040 08/25/20 0030   Temporal Monitor Lying Left arm 60      Pain Score       --                  Vitals:    08/25/20 0040 08/25/20 0049 08/25/20 0106   BP: (!) 236/105 (!) 177/77 158/61   Pulse: 80 79 72   Patient Position - Orthostatic VS: Lying Sitting Sitting         Visual Acuity      ED Medications  Medications   sodium chloride (PF) 0 9 % injection 3 mL (has no administration in time range)   nitroGLYcerin (TRIDIL) 50 mg in 250 mL infusion (premix) (100 mcg/min Intravenous New Bag 8/25/20 0051)   cefepime (MAXIPIME) IVPB (premix) 1,000 mg 50 mL (1,000 mg Intravenous New Bag 8/25/20 0120)   vancomycin 750 mg in sodium chloride 0 9% 250 mL (has no administration in time range)   metroNIDAZOLE (FLAGYL) IVPB (premix) 500 mg 100 mL (has no administration in time range)   furosemide (LASIX) injection 40 mg (has no administration in time range)   methylPREDNISolone sodium succinate (FOR EMS ONLY) (Solu-MEDROL) 125 MG injection 125 mg (0 mg Does not apply Given to EMS 8/25/20 0121)   nitroglycerin (FOR EMS ONLY) (NITRO-BID) 2 % TD ointment 1 inch (0 inches Does not apply Given to EMS 8/25/20 0121)   magnesium sulfate (FOR EMS ONLY) 2 g/50 mL IVPB (premix) 2 g (0 g Does not apply Given to EMS 8/25/20 0121)       Diagnostic Studies  Results Reviewed     Procedure Component Value Units Date/Time    Blood culture #1 [[id number]] Collected:  08/25/20 0107    Lab Status: In process Specimen:  Blood from Arm, Left Updated:  08/25/20 0121    Blood culture #2 [[id number]] Collected:  08/25/20 0113    Lab Status:   In process Specimen:  Blood from Hand, Left Updated:  08/25/20 2058    Basic metabolic panel [[id number]]  (Abnormal) Collected:  08/25/20 0043    Lab Status:  Final result Specimen:  Blood from Arm, Left Updated:  08/25/20 0112     Sodium 128 mmol/L      Potassium 5 2 mmol/L      Chloride 94 mmol/L      CO2 24 mmol/L      ANION GAP 10 mmol/L      BUN 61 mg/dL      Creatinine 7 27 mg/dL      Glucose 412 mg/dL      Calcium 8 6 mg/dL      eGFR 8 ml/min/1 73sq m     Narrative:       Christina guidelines for Chronic Kidney Disease (CKD):     Stage 1 with normal or high GFR (GFR > 90 mL/min/1 73 square meters)    Stage 2 Mild CKD (GFR = 60-89 mL/min/1 73 square meters)    Stage 3A Moderate CKD (GFR = 45-59 mL/min/1 73 square meters)    Stage 3B Moderate CKD (GFR = 30-44 mL/min/1 73 square meters)    Stage 4 Severe CKD (GFR = 15-29 mL/min/1 73 square meters)    Stage 5 End Stage CKD (GFR <15 mL/min/1 73 square meters)  Note: GFR calculation is accurate only with a steady state creatinine    Magnesium [[id number]]  (Abnormal) Collected:  08/25/20 0043    Lab Status:  Final result Specimen:  Blood from Arm, Left Updated:  08/25/20 0112     Magnesium 3 2 mg/dL     NT-BNP PRO [[id number]]  (Abnormal) Collected:  08/25/20 0043    Lab Status:  Final result Specimen:  Blood from Arm, Left Updated:  08/25/20 0112     NT-proBNP 34,230 pg/mL     Troponin I [[id number]]  (Normal) Collected:  08/25/20 0043    Lab Status:  Final result Specimen:  Blood from Arm, Left Updated:  08/25/20 0107     Troponin I <0 02 ng/mL     CBC and differential [[id number]]  (Abnormal) Collected:  08/25/20 0043    Lab Status:  Final result Specimen:  Blood from Arm, Left Updated:  08/25/20 0051     WBC 8 55 Thousand/uL      RBC 3 51 Million/uL      Hemoglobin 11 0 g/dL      Hematocrit 33 4 %      MCV 95 fL      MCH 31 3 pg      MCHC 32 9 g/dL      RDW 13 0 %      MPV 8 4 fL      Platelets 871 Thousands/uL      nRBC 0 /100 WBCs      Neutrophils Relative 78 %      Immat GRANS % 1 %      Lymphocytes Relative 15 %      Monocytes Relative 4 %      Eosinophils Relative 1 %      Basophils Relative 1 %      Neutrophils Absolute 6 76 Thousands/µL      Immature Grans Absolute 0 04 Thousand/uL      Lymphocytes Absolute 1 25 Thousands/µL      Monocytes Absolute 0 38 Thousand/µL      Eosinophils Absolute 0 08 Thousand/µL      Basophils Absolute 0 04 Thousands/µL                  X-ray chest 1 view portable   ED Interpretation by Isa Craven MD (08/25 0101)   Right lower lobe pneumonia as interpreted by myself                   Procedures  ECG 12 Lead Documentation Only    Date/Time: 8/25/2020 12:56 AM  Performed by: Isa Craven MD  Authorized by: Isa Craven MD     ECG reviewed by me, the ED Provider: yes    Patient location:  ED  Previous ECG:     Previous ECG:  Compared to current    Comparison ECG info:  6/9/20    Similarity:  No change    Comparison to cardiac monitor: Yes    Interpretation:     Interpretation: non-specific    Rate:     ECG rate:  78    ECG rate assessment: normal    Rhythm:     Rhythm: sinus rhythm    Ectopy:     Ectopy: none    QRS: QRS axis:  Normal    QRS intervals:  Normal  Conduction:     Conduction: normal    ST segments:     ST segments:  Normal  T waves:     T waves: normal    Other findings:     Other findings: LVH    CriticalCare Time  Performed by: Codi Sahu MD  Authorized by: Codi Sahu MD     Critical care provider statement:     Critical care time (minutes):  61    Critical care start time:  8/25/2020 12:40 AM    Critical care end time:  8/25/2020 1:41 AM    Critical care time was exclusive of:  Separately billable procedures and treating other patients    Critical care was necessary to treat or prevent imminent or life-threatening deterioration of the following conditions:  Cardiac failure and respiratory failure    Critical care was time spent personally by me on the following activities:  Obtaining history from patient or surrogate, development of treatment plan with patient or surrogate, discussions with consultants, evaluation of patient's response to treatment, examination of patient, review of old charts, re-evaluation of patient's condition, ordering and review of radiographic studies and ordering and review of laboratory studies    I assumed direction of critical care for this patient from another provider in my specialty: no               ED Course  ED Course as of Aug 25 0143   Tue Aug 25, 2020   0104 Patient appears to have a right-sided pneumonia  Will obtain blood cultures and start empiric antibiotics  Will not give fluids as the patient's respiratory distress is likely secondary to flash pulmonary edema  Fluid administration would likely worsen his pulmonary edema and resulted intubation increasing his risk for mortality and morbidity  0113 Patient appears much more comfortable now  Blood pressure is 158/61  Saturating in the mid 90s  Will increase the nitroglycerin drip        0114 Chronic, slightly decreased   Sodium(!): 128   0115 Increased, on dialysis   Creatinine(!): 7 27   0119 ICU attending paged      979 0035 with Dr Laquita Peter who reviewed the case and imaging  Since the patient is stable and doing well on his current management, the patient may remain appear  However, if he does decompensate, he may need a higher level of care  States that the x-ray findings could represent atypical pulmonary edema  However, he does agree to treat for possible pneumonia  Will admit at this time  80 SLIM recommends adding lasix          US AUDIT      Most Recent Value   Initial Alcohol Screen: US AUDIT-C    1  How often do you have a drink containing alcohol?  0 Filed at: 08/25/2020 0039   2  How many drinks containing alcohol do you have on a typical day you are drinking? 0 Filed at: 08/25/2020 0039   3a  Male UNDER 65: How often do you have five or more drinks on one occasion? 0 Filed at: 08/25/2020 0039   3b  FEMALE Any Age, or MALE 65+: How often do you have 4 or more drinks on one occassion? 0 Filed at: 08/25/2020 0039   Audit-C Score  0 Filed at: 08/25/2020 5529                                            MDM  Number of Diagnoses or Management Options  Diagnosis management comments: Likely flash pulmonary edema given physical exam and hypertensive state  Patient improving on BiPAP and nitroglycerin drip  Plan to check labs, EKG, chest x-ray  Patient will need to be admitted          Disposition  Final diagnoses:   Ac[facility] ([facility] )   Acute pulmonary edema (HCC)   Pneumonia   ESRD (end stage renal disease) on dialysis [facility])     Time reflects when diagnosis was documented in both MDM as applicable and the Disposition within this note     Time User Action Codes Description Comment    8/25/2020  1:33 AM Estil Rasher P Add [J96 01] Acute respiratory failure with hypoxia ([facility] )     8/25/2020  1:33 AM Estil Rasher P Add [J81 0] Acute pulmonary edema ([facility] )     8/25/2020  1:33 AM Estil Rasher P Add [J18 9] Pneumonia     8/25/2020  1:33 AM Marlon [place] Add [N18 6,  Z99 2] ESRD (end stage renal disease) on dialysis [facility])       ED Disposition     ED Disposition Condition Date/Time Comment    Admit Stable Tue Aug 25, 2020  1:33 AM         Follow-up Information    None         Patient's Medications   Discharge Prescriptions    No medications on file     No discharge procedures on file      PDMP Review     None          ED Provider  Electronically Signed by           Abdulkadir Sanders MD  08/25/20 [address], [place]  08/25/20 2470

## 2020-08-25 NOTE — ASSESSMENT & PLAN NOTE
· Continue BiPAP  · Will start IV Lasix b i d   · Patient is end-stage renal disease on hemodialysis and also has underlying CHF

## 2020-08-25 NOTE — PLAN OF CARE
Problem: Potential for Falls  Goal: Patient will remain free of falls  Description: INTERVENTIONS:  - Assess patient frequently for physical needs  -  Identify cognitive and physical deficits and behaviors that affect risk of falls  -  [place] fall precautions as indicated by assessment   - Educate patient/family on patient safety including physical limitations  - Instruct patient to call for assistance with activity based on assessment  - Modify environment to reduce risk of injury  - Consider OT/PT consult to assist with strengthening/mobility  Outcome: Progressing     Problem: NEUROSENSORY - ADULT  Goal: Achieves stable or improved neurological status  Description: INTERVENTIONS  - Monitor and report changes in neurological status  - Monitor vital signs such as temperature, blood pressure, glucose, and any other labs ordered         Outcome: Progressing  Goal: Achieves maximal functionality and self care  Description: INTERVENTIONS  - Monitor swallowing and airway patency with patient fatigue and changes in neurological status  - Encourage and assist patient to increase activity and self care     - Encourage visually impaired, hearing impaired and aphasic patients to use assistive/communication devices  Outcome: Progressing     Problem: CARDIOVASCULAR - ADULT  Goal: Maintains optimal cardiac output and hemodynamic stability  Description: INTERVENTIONS:  - Monitor I/O, vital signs and rhythm  - Monitor for S/S and trends of decreased cardiac output  - Administer and titrate ordered vasoactive medications to optimize hemodynamic stability  - Assess quality of pulses, skin color and temperature  - Assess for signs of decreased coronary artery perfusion  - Instruct patient to report change in severity of symptoms  Outcome: Progressing  Goal: Absence of cardiac dysrhythmias or at baseline rhythm  Description: INTERVENTIONS:  - Continuous cardiac monitoring, vital signs, obtain 12 lead EKG if ordered  - Administer antiarrhythmic and heart rate control medications as ordered  - Monitor electrolytes and administer replacement therapy as ordered  Outcome: Progressing     Problem: RESPIRATORY - ADULT  Goal: Achieves optimal ventilation and oxygenation  Description: INTERVENTIONS:  - Assess for changes in respiratory status  - Assess for changes in mentation and behavior  - Position to facilitate oxygenation and minimize respiratory effort  - Oxygen administered by appropriate delivery if ordered  - Encourage broncho-pulmonary hygiene including cough, deep breathe, Incentive Spirometry  - Assess the need for suctioning and aspirate as needed  - Assess and instruct to report SOB or any respiratory difficulty  - Respiratory Therapy support as indicated  Outcome: Progressing     Problem: GASTROINTESTINAL - ADULT  Goal: Minimal or absence of nausea and/or vomiting  Description: INTERVENTIONS:  - Administer IV fluids if ordered to ensure adequate hydration  - Administer ordered antiemetic medications as needed  - Provide nonpharmacologic comfort measures as appropriate  - Advance diet as tolerated, if ordered  - Consider nutrition services referral to assist patient with adequate nutrition and appropriate food choices  Outcome: Progressing  Goal: Maintains or returns to baseline bowel function  Description: INTERVENTIONS:  - Assess bowel function  - Encourage oral fluids to ensure adequate hydration  - Administer IV fluids if ordered to ensure adequate hydration  - Administer ordered medications as needed  - Encourage mobilization and activity  - Consider nutritional services referral to assist patient with adequate nutrition and appropriate food choices  Outcome: Progressing  Goal: Maintains adequate nutritional intake  Description: INTERVENTIONS:  - Monitor percentage of each meal consumed  - Identify factors contributing to decreased intake, treat as appropriate  - Assist with meals as needed  - Monitor I&O, weight, and lab values if indicated  - Obtain nutrition services referral as needed  Outcome: Progressing     Problem: GENITOURINARY - ADULT  Goal: Maintains or returns to baseline urinary function  Description: INTERVENTIONS:  - Assess urinary function  - Encourage oral fluids to ensure adequate hydration if ordered  - Administer IV fluids as ordered to ensure adequate hydration  - Administer ordered medications as needed  - Offer frequent toileting  - Follow urinary retention protocol if ordered  Outcome: Progressing  Goal: Absence of urinary retention  Description: INTERVENTIONS:  - Assess patient's ability to void and empty bladder  - Monitor I/O  - Bladder scan as needed  Outcome: Progressing     Problem: METABOLIC, FLUID AND ELECTROLYTES - ADULT  Goal: Electrolytes maintained within normal limits  Description: INTERVENTIONS:  - Monitor labs and assess patient for signs and symptoms of electrolyte imbalances  - Administer electrolyte replacement as ordered  - Monitor response to electrolyte replacements, including repeat lab results as appropriate  - Instruct patient on fluid and nutrition as appropriate  Outcome: Progressing  Goal: Fluid balance maintained  Description: INTERVENTIONS:  - Monitor labs   - Monitor I/O and WT  - Instruct patient on fluid and nutrition as appropriate  - Assess for signs & symptoms of volume excess or deficit  Outcome: Progressing  Goal: Glucose maintained within target range  Description: INTERVENTIONS:  - Monitor Blood Glucose as ordered  - Assess for signs and symptoms of hyperglycemia and hypoglycemia  - Administer ordered medications to maintain glucose within target range  - Assess nutritional intake and initiate nutrition service referral as needed  Outcome: Progressing     Problem: SKIN/TISSUE INTEGRITY - ADULT  Goal: Skin integrity remains intact  Description: INTERVENTIONS  - Identify patients at risk for skin breakdown  - Assess and monitor skin integrity  - Assess and monitor nutrition and hydration status  - Monitor labs   - Assess for incontinence   - Turn and reposition patient  - Assist with mobility/ambulation  - Relieve pressure over bony prominences  - Avoid friction and shearing  - Provide appropriate hygiene as needed including keeping skin clean and dry  - Evaluate need for skin moisturizer/barrier cream  - Collaborate with interdisciplinary team (i e  Nutrition, Rehabilitation, etc )   - Patient/family teaching  Outcome: Progressing  Goal: Incision(s), wounds(s) or drain site(s) healing without S/S of infection  Description: INTERVENTIONS  - Assess and document risk factors for skin impairment   - Assess and document dressing and surrounding tissue  - Consider nutrition services referral as needed  - Oral mucous membranes remain intact  - Provide patient/ family education  Outcome: Progressing  Goal: Oral mucous membranes remain intact  Description: INTERVENTIONS  - Assess oral mucosa and hygiene practices  - Implement preventative oral hygiene regimen  - Implement oral medicated treatments as ordered  - Initiate Nutrition services referral as needed  Outcome: Progressing     Problem: HEMATOLOGIC - ADULT  Goal: Maintains hematologic stability  Description: INTERVENTIONS  - Assess for signs and symptoms of bleeding or hemorrhage  - Monitor labs  - Administer supportive blood products/factors as ordered and appropriate  Outcome: Progressing     Problem: MUSCULOSKELETAL - ADULT  Goal: Maintain or return mobility to safest level of function  Description: INTERVENTIONS:  - Assess patient's ability to carry out ADLs; assess patient's baseline for ADL function and identify physical deficits which impact ability to perform ADLs (bathing, care of mouth/teeth, toileting, grooming, dressing, etc )  - Assess/evaluate cause of self-care deficits   - Assess range of motion  - Assess patient's mobility  - Assess patient's need for assistive devices and provide as appropriate  - Encourage maximum independence but intervene and supervise when necessary  - Involve family in performance of ADLs  - Assess for home care needs following discharge   - Consider OT consult to assist with ADL evaluation and planning for discharge  - Provide patient education as appropriate  Outcome: Progressing  Goal: Maintain proper alignment of affected body part  Description: INTERVENTIONS:  - Support, maintain and protect limb and body alignment  - Provide patient/ family with appropriate education  Outcome: Progressing

## 2020-08-25 NOTE — PROGRESS NOTES
Vancomycin Assessment    Berto Agudelo is a 47 y o  male who is currently receiving vancomycin 750mg once, then 500mg Tues, Thurs, Sat after HD for Pneumonia     Relevant clinical data and objective history reviewed:  Creatinine   Date Value Ref Range Status   08/25/2020 7 48 (H) 0 60 - 1 30 mg/dL Final     Comment:     Standardized to IDMS reference method   08/25/2020 7 27 (H) 0 60 - 1 30 mg/dL Final     Comment:     Standardized to IDMS reference method   06/09/2020 3 56 (H) 0 60 - 1 30 mg/dL Final     Comment:     Standardized to IDMS reference method   11/06/2015 1 26 0 60 - 1 30 mg/dL Final     Comment:     Standardized to IDMS reference method   06/05/2015 1 07 0 60 - 1 30 mg/dL Final     Comment:     Standardized to IDMS reference method   12/14/2014 1 13 0 60 - 1 30 mg/dL Final     Comment:     Standardized to IDMS reference method     Vancomycin Rm   Date Value Ref Range Status   10/23/2019 24 1 ug/mL Final     /55 (BP Location: Left leg)   Pulse 69   Temp 98 2 °F (36 8 °C) (Temporal)   Resp 20   Ht 5' 4" (1 626 m)   Wt 50 8 kg (112 lb)   SpO2 93%   BMI 19 22 kg/m²   I/O last 3 completed shifts: In: 286 [P O :30; I V :156; IV Piggyback:100]  Out: -   Lab Results   Component Value Date/Time    BUN 63 (H) 08/25/2020 05:28 AM    BUN 31 (H) 11/06/2015 07:34 AM    WBC 11 78 (H) 08/25/2020 05:28 AM    WBC 6 52 11/06/2015 07:34 AM    HGB 9 8 (L) 08/25/2020 05:28 AM    HGB 11 7 (L) 11/06/2015 07:34 AM    HCT 30 4 (L) 08/25/2020 05:28 AM    HCT 35 4 (L) 11/06/2015 07:34 AM    MCV 95 08/25/2020 05:28 AM    MCV 89 11/06/2015 07:34 AM     08/25/2020 05:28 AM     11/06/2015 07:34 AM     Temp Readings from Last 3 Encounters:   08/25/20 98 2 °F (36 8 °C) (Temporal)   06/22/20 (!) 97 °F (36 1 °C) (Tympanic)   06/09/20 (!) 97 4 °F (36 3 °C)     Vancomycin Days of Therapy: 1    Assessment/Plan  The patient is currently on vancomycin utilizing pulse dosing based on actual body weight  Baseline risks associated with therapy include: pre-existing renal impairment  The patient is currently receiving 750mg once, then 500mg Tues, Thurs, Sat after HD and after clinical evaluation will be changed to same  Pharmacy will also follow closely for s/sx of nephrotoxicity, infusion reactions, and appropriateness of therapy  BMP and CBC will be ordered per protocol  Plan for trough as patient approaches steady state, prior to the 3rd  dose at approximately 0600 on 6/29/20  Due to infection severity, will target a trough of 15-20 (appropriate for most indications)   Pharmacy will continue to follow the patients culture results and clinical progress daily      Valery Essex, Pharmacist

## 2020-08-25 NOTE — CONSULTS
Consultation - Cardiology   Nicolasa Story 47 y o  male MRN: [id number]  Unit/Bed#:  Encounter: [id number]    Consults      Physician Requesting Consult: Li Cote MD  Reason for Consult / Principal Problem: CHF    History of Present Illness   HPI: Nicolasa Story is a 47y o  year old male who has a history of coronary artery disease with PCI in 2017, hypertension, chronic diastolic congestive heart failure, end-stage renal disease on dialysis history renal transplant next type 1 diabetic next hyperlipidemia next normocytic anemia presents with acute onset of dyspnea  Patient tells me he was his office yesterday and he was having a normal day at work  He checked his blood pressure in the morning it was around 483 systolic and heart rate was well controlled  He had a normal day at the office and then went home  When he got to his house he started to have some mild shortness of breath  He checks his oxygen saturation at home and he noted it was down to around 90%  Blood pressures were rising and he suddenly got acutely short of breath  He does have a history of very labile blood pressures with flash pulmonary edema in the past   He denies any fevers or chills  Denies any cough  Denies any significant edema or weight gain  Denies any change in salt intake  He was admitted to the hospital chest x-ray showed right lower lobe pneumonia and vascular congestion  He makes a little bit a urine received dose of IV Lasix but is awaiting dialysis today  Review of Systems   Constitution: Negative  HENT: Negative  Eyes: Negative  Cardiovascular: Positive for dyspnea on exertion  Respiratory: Positive for shortness of breath  Endocrine: Negative  Hematologic/Lymphatic: Negative  Skin: Negative  Musculoskeletal: Negative  Gastrointestinal: Negative  Genitourinary: Negative  Neurological: Negative  Psychiatric/Behavioral: Negative      All other systems reviewed and are negative  Historical Information   Past Medical History:   Diagnosis Date    Bacteremia 12/21/2018    CAD (coronary artery disease)     s/p MARC to LCx, pLAD 2/2018    Cardiac arrest (HCC)     Chronic kidney disease     Diabetes mellitus type 1 ([facility] )     Dialysis patient [facility])     Access in right chest    GI bleed     Hyperlipidemia     Hypertension     Infection at site of external fixator pin ([facility] )     MI (myocardial infarction) ([facility] )     Pneumonia     Last Assessed [id number]    Renal failure     Renal transplant, status post 07/21/2007     Past Surgical History:   Procedure Laterality Date    AMPUTATION Right 02/2019    aboce knee    ANKLE FRACTURE SURGERY      ANKLE HARDWARE REMOVAL Right 7/31/2017    Procedure: REMOVAL HARDWARE ANKLE;  Surgeon: Mike Escobar MD;  [facility];  Service: Orthopedics    ANKLE HARDWARE REMOVAL Right 8/17/2017    Procedure: TIBIA FAILED HARDWARE REMOVAL;  Surgeon: Mike Escobar MD;  [facility]: [facility];  Service: Orthopedics    CLOSED REDUCTION ANKLE Right 7/3/2017    Procedure: CLOSED REDUCTION DISTAL TIB-FIB AND CASTING VS;  Surgeon: Mike Escobar MD;  [facility]: [facility];  Service: Orthopedics    CORONARY ANGIOPLASTY WITH STENT PLACEMENT  02/2018    CAD s/p MARC to LCx, pLAD    ESOPHAGOGASTRODUODENOSCOPY N/A 12/20/2018    Procedure: ESOPHAGOGASTRODUODENOSCOPY (EGD) in ICU; Surgeon: Mic Jimenez MD;  Location: [facility];   Service: Gastroenterology    EYE SURGERY      FRACTURE SURGERY      ORIF Rt Ankle    GLUTAMIC ACID DECARBOXYLASE (HISTORICAL)      IR AV FISTULAGRAM/GRAFTOGRAM  4/16/2020    IR PICC LINE  8/20/2018    IR TUNNELED DIALYSIS CATHETER CHECK/CHANGE/REPOSITION/ANGIOPLASTY  1/8/2020    IR TUNNELED DIALYSIS CATHETER PLACEMENT  10/21/2019    IR TUNNELED DIALYSIS CATHETER REMOVAL  5/29/2020    IR VENOUS LINE REMOVAL  10/5/2018    LEG AMPUTATION Right 02/01/2019    Above the knee    NEPHRECTOMY TRANSPLANTED ORGAN      DE ANASTOMOSIS,AV,ANY SITE Right 2/11/2020    Procedure: CREATION FISTULA ARTERIOVENOUS (AV) right upper extremity;  Surgeon: Nedra Tobar MD;  Location[facility] MAIN OR;  Service: Vascular    AK OPEN TREATMENT FRACTURE DISTAL TIBIA FIBULA Right 7/3/2017    Procedure: OPEN REDUCTION W/ INTERNAL FIXATION (ORIF); Surgeon: Natali Caputo MD;  [facility]: [facility];  Service: Orthopedics    TOE AMPUTATION Right 10/27/2016    Procedure: AMPUTATION TOE;  Surgeon: Solo Denton DPM;  Location: [facility];  Service:      Social History     Substance and Sexual Activity   Alcohol Use Not Currently     Social History     Substance and Sexual Activity   Drug Use Never     Social History     Tobacco Use   Smoking Status Never Smoker   Smokeless Tobacco Never Used     Family History: non-contributory    Meds/Allergies   all current active meds have been reviewed  nitroGLYcerin, 100 mcg/min, Last Rate: 98 mcg/min (08/25/20 0825)        No Known Allergies    Objective   Vitals: Blood pressure 131/58, pulse 74, temperature (!) 97 3 °F (36 3 °C), temperature source Tympanic, resp  rate (!) 26, height 5' 4" (1 626 m), weight 50 8 kg (112 lb), SpO2 95 %  , Body mass index is 19 22 kg/m² ,   Orthostatic Blood Pressures      Most Recent Value   Blood Pressure  131/58 filed at 08/25/2020 0820   Patient Position - Orthostatic VS  Lying filed at 08/25/2020 3952        Systolic (63GSK), YNW:555 , Min:103 , CSB:659     Diastolic (92AWB), NVI:68, Min:51, Max:105      Intake/Output Summary (Last 24 hours) at 8/25/2020 0909  Last data filed at 8/25/2020 0600  Gross per 24 hour   Intake 286 03 ml   Output    Net 286 03 ml       Weight (last 2 days)     Date/Time   Weight    08/25/20 0242   50 8 (112)    08/25/20 0040   53 8 (118 61)              Invasive Devices     Central Venous Catheter Line            CVC Central Lines 01/08/20 Double 229 days          Peripheral Intravenous Line            Peripheral IV 08/25/20 Left Antecubital less than 1 day Peripheral IV 08/25/20 Left Arm less than 1 day    Peripheral IV 08/25/20 Left Hand less than 1 day          Line            Hemodialysis AV Fistula 02/11/20 Right Forearm 195 days                  Physical Exam  Vitals signs and nursing note reviewed  Constitutional:       General: He is not in acute distress  Appearance: He is well-developed  HENT:      Head: Normocephalic and atraumatic  Eyes:      Conjunctiva/sclera: Conjunctivae normal       Pupils: Pupils are equal, round, and reactive to light  Neck:      Musculoskeletal: Neck supple  Cardiovascular:      Rate and Rhythm: Normal rate and regular rhythm  Heart sounds: Normal heart sounds  No murmur  No friction rub  Pulmonary:      Effort: Pulmonary effort is normal  No respiratory distress  Breath sounds: Decreased breath sounds present  No wheezing or rales  Abdominal:      General: Bowel sounds are normal  There is no distension  Palpations: Abdomen is soft  Tenderness: There is no abdominal tenderness  There is no rebound  Musculoskeletal: Normal range of motion  General: Swelling present  No tenderness or deformity  Skin:     General: Skin is warm and dry  Findings: No erythema  Neurological:      Mental Status: He is alert and oriented to person, place, and time  Cranial Nerves: No cranial nerve deficit               Laboratory Results:  Results from last 7 days   Lab Units 08/25/20  0043   TROPONIN I ng/mL <0 02       CBC with diff:   Results from last 7 days   Lab Units 08/25/20 0528 08/25/20  0043   WBC Thousand/uL 11 78* 8 55   HEMOGLOBIN g/dL 9 8* 11 0*   HEMATOCRIT % 30 4* 33 4*   MCV fL 95 95   PLATELETS Thousands/uL 238 271   MCH pg 30 6 31 3   MCHC g/dL 32 2 32 9   RDW % 12 9 13 0   MPV fL 8 7* 8 4*   NRBC AUTO /100 WBCs  --  0         CMP:  Results from last 7 days   Lab Units 08/25/20 0528 08/25/20  0043   POTASSIUM mmol/L 5 1 5 2   CHLORIDE mmol/L 96* 94*   CO2 mmol/L 22 24 BUN mg/dL 63* 61*   CREATININE mg/dL 7 48* 7 27*   CALCIUM mg/dL 8 4 8 6   AST U/L 12  --    ALT U/L 11*  --    ALK PHOS U/L 183*  --    EGFR ml/min/1 73sq m 8 8         BMP:  Results from last 7 days   Lab Units 20  0528 20  0043   POTASSIUM mmol/L 5 1 5 2   CHLORIDE mmol/L 96* 94*   CO2 mmol/L 22 24   BUN mg/dL 63* 61*   CREATININE mg/dL 7 48* 7 27*   CALCIUM mg/dL 8 4 8 6       BNP:  No results for input(s): BNP in the last 72 hours  Magnesium:   Results from last 7 days   Lab Units 20  0043   MAGNESIUM mg/dL 3 2*       Coags:       TSH:       Hemoglobin A1C       Lipid Profile:         Cardiac testing:   Results for orders placed during the hospital encounter of 20   Echo complete with contrast if indicated    Narrative [address] [facility] [address] [facility], [facility] [address]  [phone]    Transthoracic Echocardiogram  2D, M-mode, Doppler, and Color Doppler    Study date:  2020    Patient: Rosendo Doran  MR number: [id number]  Account number: [de-identified]  : 1970  Age: 47 years  Gender: Male  Status: Outpatient  Location: Echo lab  Height: 62 in  Weight: 119 7 lb  BP: 148/ 70 mmHg    Indications: pre-op exam; coronary artery disease-stent    Diagnoses: V72 81 - PREOP CARDIOVSCLR EXAM    Sonographer:  Hardy Perez RDCS  Primary Physician:  Aniya Bajwa DO  Group:  [facility]  Interpreting Physician:  Sveta Doran DO    SUMMARY    LEFT VENTRICLE:  Systolic function was normal  Ejection fraction was estimated to be 60 %  There were no regional wall motion abnormalities  Wall thickness was mildly to moderately increased  Features were consistent with a pseudonormal left ventricular filling pattern, with concomitant abnormal relaxation and increased filling pressure (grade 2 diastolic dysfunction)  RIGHT VENTRICLE:  Systolic function was normal     LEFT ATRIUM:  The atrium was mildly dilated      MITRAL VALVE:  There was mild regurgitation  TRICUSPID VALVE:  There was mild regurgitation  Estimated peak PA pressure was 44 mmHg  PULMONIC VALVE:  There was mild regurgitation  HISTORY: PRIOR HISTORY: diabetes mellitus type 1, end stage renal disease-pt  on dialysis; pt  is on transplant list; respiratory failure with hypoxia, pulmonary edema, hypertension, NSTEMI    PROCEDURE: The procedure was performed in the echo lab  This was a routine study  The transthoracic approach was used  The study included complete 2D imaging, M-mode, complete spectral Doppler, and color Doppler  Images were obtained from  the parasternal, apical, subcostal, and suprasternal notch acoustic windows  Image quality was adequate  LEFT VENTRICLE: Size was normal  Systolic function was normal  Ejection fraction was estimated to be 60 %  There were no regional wall motion abnormalities  Wall thickness was mildly to moderately increased  DOPPLER: Features were  consistent with a pseudonormal left ventricular filling pattern, with concomitant abnormal relaxation and increased filling pressure (grade 2 diastolic dysfunction)  RIGHT VENTRICLE: The size was normal  Systolic function was normal  Wall thickness was normal     LEFT ATRIUM: The atrium was mildly dilated  RIGHT ATRIUM: Size was normal     MITRAL VALVE: Valve structure was normal  There was normal leaflet separation  DOPPLER: The transmitral velocity was within the normal range  There was no evidence for stenosis  There was mild regurgitation  AORTIC VALVE: The valve was trileaflet  Leaflets exhibited mildly to moderately increased thickness, normal cuspal separation, and sclerosis  DOPPLER: Transaortic velocity was within the normal range  There was no evidence for stenosis  There was no regurgitation  TRICUSPID VALVE: The valve structure was normal  There was normal leaflet separation  DOPPLER: The transtricuspid velocity was within the normal range   There was no evidence for stenosis  There was mild regurgitation  Estimated peak PA  pressure was 44 mmHg  PULMONIC VALVE: Leaflets exhibited normal thickness, no calcification, and normal cuspal separation  DOPPLER: The transpulmonic velocity was within the normal range  There was mild regurgitation  PERICARDIUM: There was no pericardial effusion  The pericardium was normal in appearance  AORTA: The root exhibited normal size  SYSTEMIC VEINS: IVC: The inferior vena cava was not well visualized  SYSTEM MEASUREMENT TABLES    2D  %FS: 41 01 %  Ao Diam: 2 55 cm  EDV(Teich): 77 61 ml  EF(Teich): 72 24 %  ESV(Teich): 21 54 ml  IVSd: 1 31 cm  LA Area: 22 69 cm2  LA Diam: 4 21 cm  LVEDV MOD A4C: 125 78 ml  LVEF MOD A4C: 66 1 %  LVESV MOD A4C: 42 63 ml  LVIDd: 4 18 cm  LVIDs: 2 46 cm  LVLd A4C: 8 74 cm  LVLs A4C: 7 14 cm  LVPWd: 1 25 cm  RA Area: 12 54 cm2  RVIDd: 3 47 cm  RWT: 0 6  SV MOD A4C: 83 14 ml  SV(Teich): 56 07 ml    CW  AV Vmax: 1 46 m/s  AV maxP 51 mmHg  PV Vmax: 1 18 m/s  PV maxP 57 mmHg  TR Vmax: 2 65 m/s  TR maxP 61 mmHg    MM  TAPSE: 2 17 cm    PW  E' Lat: 0 1 m/s  E' Sept: 0 07 m/s  E/E' Lat: 12 73  E/E' Sept: 19 92  LVOT Vmax: 0 92 m/s  LVOT maxPG: 3 38 mmHg  MV A Giancarlo: 0 86 m/s  MV Dec Garza: 7 25 m/s2  MV DecT: 183 5 ms  MV E Giancarlo: 1 33 m/s  MV E/A Ratio: 1 54  RVOT Vmax: 0 71 m/s  RVOT maxP 99 mmHg    [employer] Accredited Echocardiography Laboratory    Prepared and electronically signed by    Dayday Marie DO  Signed 2020 16:27:55       No results found for this or any previous visit  No results found for this or any previous visit  No results found for this or any previous visit  Imaging: I have personally reviewed pertinent films in PACS  X-ray Chest 1 View Portable    Result Date: 2020  Narrative: CHEST INDICATION:   Shortness of breath  Patient has suspected COVID-19  COMPARISON:  2019 and 2020   EXAM PERFORMED/VIEWS:  XR CHEST PORTABLE FINDINGS: Dense consolidation at the right lower lung field  Lack of silhouetting of the cardiac border implies lower lobe location  Patchy air opacities demonstrated peripherally in the left lateral lower lung field  Effusion on either side  No pneumothorax  Heart is mildly enlarged but unchanged  Pulmonary vasculature is partially obscured  Question mild cephalization  Bones are unremarkable  Impression: 1  Dense right lower lobe pneumonia  Patchy opacities in the left could represent other foci of infection  2   Question background pulmonary vascular congestion though focal distribution of consolidations speaks against alveolar edema  Note: I agree with the preliminary interpretation by the ED care provider documented in Epic  Workstation performed: [id number]       EKG reviewed personally:  Sinus rhythm LVH  Telemetry reviewed personally:  No events    Assessment:  Principal Problem:    Respiratory distress  Active Problems:    Type 1 diabetes mellitus (HCC)    Renal transplant, status post    S/P AKA (above knee amputation), right (HCC)    Depressed left ventricular ejection fraction    Acute pulmonary edema (HCC)    Hypertensive urgency    ESRD (end stage renal disease) on dialysis ([facility] [address] )      Assesment/ Plan:  1  Flash pulmonary edema, likely secondary to labile blood pressures and volume overload with end-stage renal disease  2  Possible right lower lobe pneumonia  3  End-stage renal disease on dialysis  4  Coronary disease stable without angina  5  Labile hypertension  6  Hyperlipidemia  7  Chronic diastolic congestive heart failure  8  Type 1 diabetes    Recommendations:   He looks quite comfortable this morning  He will be dialyzed today goal to remove 2-3 L of fluid  Blood pressures have  significantly improved  He is currently 131/58    He is currently on 98 mcg of nitroglycerin IV will start to down titrate after dialysis and volume removal   He may benefit from a low-dose of isosorbide mononitrate 30 daily  Continue current medications  Will check echocardiogram to make sure there has been no change in LV function  Recent stress test showed small anterolateral infarct with mild sg-infarct ischemia  Continue current treatment for coronary artery disease  Will discuss with Medicine findings in the right lower lobe with possible pneumonia  Counseling / Coordination of Care  Total floor / unit time spent today 40minutes  Greater than 50% of total time was spent with the patient and / or family counseling and / or coordination of care  A description of the counseling / coordination of care:  Case discussed with medicine          Code Status: Level 1 - Full Code

## 2020-08-26 ENCOUNTER — TELEPHONE (OUTPATIENT)
Dept: GASTROENTEROLOGY | Facility: CLINIC | Age: 50
End: 2020-08-26

## 2020-08-26 PROBLEM — J18.9 COMMUNITY ACQUIRED PNEUMONIA OF LEFT UPPER LOBE OF LUNG: Status: ACTIVE | Noted: 2020-08-26

## 2020-08-26 LAB
ALBUMIN SERPL BCP-MCNC: 3 G/DL (ref 3.5–5)
ALP SERPL-CCNC: 164 U/L (ref 46–116)
ALT SERPL W P-5'-P-CCNC: 13 U/L (ref 12–78)
ANION GAP SERPL CALCULATED.3IONS-SCNC: 9 MMOL/L (ref 4–13)
AST SERPL W P-5'-P-CCNC: 8 U/L (ref 5–45)
BASOPHILS # BLD AUTO: 0.04 THOUSANDS/ΜL (ref 0–0.1)
BASOPHILS NFR BLD AUTO: 1 % (ref 0–1)
BILIRUB SERPL-MCNC: 0.5 MG/DL (ref 0.2–1)
BUN SERPL-MCNC: 49 MG/DL (ref 5–25)
CALCIUM SERPL-MCNC: 8.4 MG/DL (ref 8.3–10.1)
CHLORIDE SERPL-SCNC: 98 MMOL/L (ref 100–108)
CO2 SERPL-SCNC: 27 MMOL/L (ref 21–32)
CREAT SERPL-MCNC: 5.72 MG/DL (ref 0.6–1.3)
EOSINOPHIL # BLD AUTO: 0.08 THOUSAND/ΜL (ref 0–0.61)
EOSINOPHIL NFR BLD AUTO: 1 % (ref 0–6)
ERYTHROCYTE [DISTWIDTH] IN BLOOD BY AUTOMATED COUNT: 13.2 % (ref 11.6–15.1)
GFR SERPL CREATININE-BSD FRML MDRD: 11 ML/MIN/1.73SQ M
GLUCOSE SERPL-MCNC: 139 MG/DL (ref 65–140)
GLUCOSE SERPL-MCNC: 159 MG/DL (ref 65–140)
GLUCOSE SERPL-MCNC: 239 MG/DL (ref 65–140)
GLUCOSE SERPL-MCNC: 248 MG/DL (ref 65–140)
GLUCOSE SERPL-MCNC: 269 MG/DL (ref 65–140)
GLUCOSE SERPL-MCNC: 289 MG/DL (ref 65–140)
HCT VFR BLD AUTO: 30.5 % (ref 36.5–49.3)
HGB BLD-MCNC: 9.9 G/DL (ref 12–17)
IMM GRANULOCYTES # BLD AUTO: 0.02 THOUSAND/UL (ref 0–0.2)
IMM GRANULOCYTES NFR BLD AUTO: 0 % (ref 0–2)
LYMPHOCYTES # BLD AUTO: 2.01 THOUSANDS/ΜL (ref 0.6–4.47)
LYMPHOCYTES NFR BLD AUTO: 25 % (ref 14–44)
MAGNESIUM SERPL-MCNC: 2.6 MG/DL (ref 1.6–2.6)
MCH RBC QN AUTO: 31 PG (ref 26.8–34.3)
MCHC RBC AUTO-ENTMCNC: 32.5 G/DL (ref 31.4–37.4)
MCV RBC AUTO: 96 FL (ref 82–98)
MONOCYTES # BLD AUTO: 0.77 THOUSAND/ΜL (ref 0.17–1.22)
MONOCYTES NFR BLD AUTO: 9 % (ref 4–12)
NEUTROPHILS # BLD AUTO: 5.29 THOUSANDS/ΜL (ref 1.85–7.62)
NEUTS SEG NFR BLD AUTO: 64 % (ref 43–75)
NRBC BLD AUTO-RTO: 0 /100 WBCS
PHOSPHATE SERPL-MCNC: 4.4 MG/DL (ref 2.7–4.5)
PLATELET # BLD AUTO: 218 THOUSANDS/UL (ref 149–390)
PMV BLD AUTO: 8.6 FL (ref 8.9–12.7)
POTASSIUM SERPL-SCNC: 4.2 MMOL/L (ref 3.5–5.3)
PROCALCITONIN SERPL-MCNC: 0.39 NG/ML
PROT SERPL-MCNC: 6.8 G/DL (ref 6.4–8.2)
RBC # BLD AUTO: 3.19 MILLION/UL (ref 3.88–5.62)
SODIUM SERPL-SCNC: 134 MMOL/L (ref 136–145)
WBC # BLD AUTO: 8.21 THOUSAND/UL (ref 4.31–10.16)

## 2020-08-26 PROCEDURE — 83735 ASSAY OF MAGNESIUM: CPT | Performed by: FAMILY MEDICINE

## 2020-08-26 PROCEDURE — 99232 SBSQ HOSP IP/OBS MODERATE 35: CPT | Performed by: FAMILY MEDICINE

## 2020-08-26 PROCEDURE — 99232 SBSQ HOSP IP/OBS MODERATE 35: CPT | Performed by: INTERNAL MEDICINE

## 2020-08-26 PROCEDURE — 84100 ASSAY OF PHOSPHORUS: CPT | Performed by: FAMILY MEDICINE

## 2020-08-26 PROCEDURE — 85025 COMPLETE CBC W/AUTO DIFF WBC: CPT | Performed by: FAMILY MEDICINE

## 2020-08-26 PROCEDURE — 84145 PROCALCITONIN (PCT): CPT | Performed by: HOSPITALIST

## 2020-08-26 PROCEDURE — 80053 COMPREHEN METABOLIC PANEL: CPT | Performed by: FAMILY MEDICINE

## 2020-08-26 PROCEDURE — 82948 REAGENT STRIP/BLOOD GLUCOSE: CPT

## 2020-08-26 RX ORDER — ISOSORBIDE MONONITRATE 30 MG/1
30 TABLET, EXTENDED RELEASE ORAL DAILY
Status: DISCONTINUED | OUTPATIENT
Start: 2020-08-26 | End: 2020-08-27 | Stop reason: HOSPADM

## 2020-08-26 RX ADMIN — AZATHIOPRINE 50 MG: 50 TABLET ORAL at 07:53

## 2020-08-26 RX ADMIN — ISOSORBIDE MONONITRATE 30 MG: 30 TABLET, EXTENDED RELEASE ORAL at 11:39

## 2020-08-26 RX ADMIN — NIFEDIPINE 90 MG: 30 TABLET, FILM COATED, EXTENDED RELEASE ORAL at 07:53

## 2020-08-26 RX ADMIN — ATORVASTATIN CALCIUM 80 MG: 40 TABLET, FILM COATED ORAL at 18:04

## 2020-08-26 RX ADMIN — ASPIRIN 81 MG 81 MG: 81 TABLET ORAL at 07:53

## 2020-08-26 RX ADMIN — INSULIN DETEMIR 7 UNITS: 100 INJECTION, SOLUTION SUBCUTANEOUS at 07:58

## 2020-08-26 RX ADMIN — METOPROLOL SUCCINATE 50 MG: 50 TABLET, EXTENDED RELEASE ORAL at 07:54

## 2020-08-26 RX ADMIN — INSULIN LISPRO 2 UNITS: 100 INJECTION, SOLUTION INTRAVENOUS; SUBCUTANEOUS at 07:55

## 2020-08-26 RX ADMIN — PANTOPRAZOLE SODIUM 40 MG: 40 TABLET, DELAYED RELEASE ORAL at 05:27

## 2020-08-26 RX ADMIN — FUROSEMIDE 40 MG: 10 INJECTION, SOLUTION INTRAMUSCULAR; INTRAVENOUS at 18:10

## 2020-08-26 RX ADMIN — INSULIN LISPRO 2 UNITS: 100 INJECTION, SOLUTION INTRAVENOUS; SUBCUTANEOUS at 21:35

## 2020-08-26 RX ADMIN — FUROSEMIDE 40 MG: 10 INJECTION, SOLUTION INTRAMUSCULAR; INTRAVENOUS at 07:55

## 2020-08-26 RX ADMIN — TACROLIMUS 1 MG: 1 CAPSULE ORAL at 14:42

## 2020-08-26 RX ADMIN — PREDNISONE 5 MG: 5 TABLET ORAL at 07:55

## 2020-08-26 RX ADMIN — INSULIN DETEMIR 7 UNITS: 100 INJECTION, SOLUTION SUBCUTANEOUS at 18:10

## 2020-08-26 RX ADMIN — HEPARIN SODIUM 5000 UNITS: 5000 INJECTION INTRAVENOUS; SUBCUTANEOUS at 21:37

## 2020-08-26 RX ADMIN — HEPARIN SODIUM 5000 UNITS: 5000 INJECTION INTRAVENOUS; SUBCUTANEOUS at 14:42

## 2020-08-26 RX ADMIN — INSULIN LISPRO 1 UNITS: 100 INJECTION, SOLUTION INTRAVENOUS; SUBCUTANEOUS at 11:43

## 2020-08-26 RX ADMIN — CEFEPIME HYDROCHLORIDE 1000 MG: 1 INJECTION, SOLUTION INTRAVENOUS at 22:50

## 2020-08-26 RX ADMIN — TACROLIMUS 1 MG: 1 CAPSULE ORAL at 20:07

## 2020-08-26 RX ADMIN — TACROLIMUS 1 MG: 1 CAPSULE ORAL at 07:52

## 2020-08-26 RX ADMIN — HEPARIN SODIUM 5000 UNITS: 5000 INJECTION INTRAVENOUS; SUBCUTANEOUS at 05:27

## 2020-08-26 NOTE — UTILIZATION REVIEW
Initial Clinical Review    Admission: Date/Time/Statement:   Admission Orders (From admission, onward)     Ordered        08/25/20 0140  Inpatient Admission  Once                   Orders Placed This Encounter   Procedures    Inpatient Admission     Standing Status:   Standing     Number of Occurrences:   1     Order Specific Question:   Admitting Physician     Answer:   Tony Dumont [[id number]]     Order Specific Question:   Level of Care     Answer:   Critical Care [15]     Order Specific Question:   Estimated length of stay     Answer:   More than 2 Midnights     Order Specific Question:   Certification     Answer:   I certify that inpatient services are medically necessary for this patient for a duration of greater than two midnights  See H&P and MD Progress Notes for additional information about the patient's course of treatment  ED Arrival Information     Expected Arrival Acuity Means of Arrival Escorted By Service Admission Type    - 8/25/2020 00:28 [facility] [address] [facility]  [facility]) Hospitalist Emergency    Arrival Complaint    -        Chief Complaint   Patient presents with    Respiratory Distress     Assessment/Plan:   47 yom to er via ems for sudden onset sob  hx chronic kidney disease status post renal transplant on tacrolimus on dialysis on TuThSa, diabetes, hypertension, CHF  Hypertensive with crackles upon ems arrival, NTG paste & Mag given, placed on Cpap  Presents to er satting in upper [de-identified] on Cpap & placed on Bipap, persistent crackles, started on nitro gtt  Admission work-up showing COVID neg pneumonia & pulmonary vascular congestion  Started on iv diuresis with lasix bid, double ivabt  Renal & cardio consulted       ED Triage Vitals   Temperature Pulse Respirations Blood Pressure SpO2   08/25/20 0040 08/25/20 0040 08/25/20 0040 08/25/20 0040 08/25/20 0030   97 5 °F (36 4 °C) 80 18 (!) 236/105 96 %      Temp Source Heart Rate Source Patient Position - Orthostatic VS BP Location FiO2 (%)   08/25/20 0040 08/25/20 0040 08/25/20 0040 08/25/20 0040 08/25/20 0030   Temporal Monitor Lying Left arm 60      Pain Score       08/25/20 0301       No Pain          Wt Readings from Last 1 Encounters:   08/25/20 50 8 kg (112 lb)     Additional Vital Signs:   08/25/20 0242   98 1 °F (36 7 °C)   72   30Abnormal     171/72Abnormal     103   95 %            Other (comment)    Lying    O2 Device: bipap at 08/25/20 0242    08/25/20 0205                  99 %   40         Other (comment)       08/25/20 0201      68   38Abnormal     137/64      99 %            Other (comment)    Sitting    O2 Device: bipap at 08/25/20 0201    08/25/20 0106      72   32Abnormal     158/61      94 %            Other (comment)   Sitting    08/25/20 0049      79   38Abnormal     177/77Abnormal        95 %            Other (comment)   Sitting    08/25/20 0040   97 5 °F (36 4 °C)   80   18   236/105Abnormal        94 %            High flow nasal cannula   Lying    08/25/20 0030                  96 %   60         Other (comment)         Pertinent Labs/Diagnostic Test Results:   Results from last 7 days   Lab Units 08/25/20  0800   SARS-COV-2  Negative     Results from last 7 days   Lab Units 08/26/20  0514 08/25/20  0528 08/25/20  0043   WBC Thousand/uL 8 21 11 78* 8 55   HEMOGLOBIN g/dL 9 9* 9 8* 11 0*   HEMATOCRIT % 30 5* 30 4* 33 4*   PLATELETS Thousands/uL 218 238 271   NEUTROS ABS Thousands/µL 5 29  --  6 76     Results from last 7 days   Lab Units 08/26/20  0514 08/25/20  0528 08/25/20  0043   SODIUM mmol/L 134* 131* 128*   POTASSIUM mmol/L 4 2 5 1 5 2   CHLORIDE mmol/L 98* 96* 94*   CO2 mmol/L 27 22 24   ANION GAP mmol/L 9 13 10   BUN mg/dL 49* 63* 61*   CREATININE mg/dL 5 72* 7 48* 7 27*   EGFR ml/min/1 73sq m 11 8 8   CALCIUM mg/dL 8 4 8 4 8 6   MAGNESIUM mg/dL 2 6  --  3 2*   PHOSPHORUS mg/dL 4 4  --   --      Results from last 7 days   Lab Units 08/26/20  0514 08/25/20  9909 AST U/L 8 12   ALT U/L 13 11*   ALK PHOS U/L 164* 183*   TOTAL PROTEIN g/dL 6 8 7 0   ALBUMIN g/dL 3 0* 3 1*   TOTAL BILIRUBIN mg/dL 0 50 0 60     Results from last 7 days   Lab Units 08/26/20  0706 08/25/20 2059 08/25/20  1959 08/25/20  1605 08/25/20  1100 08/25/20  0718 08/25/20  0333   POC GLUCOSE mg/dl 239* 429* 367* 185* 203* 258* 266*     Results from last 7 days   Lab Units 08/26/20  0514 08/25/20  0528 08/25/20  0043   GLUCOSE RANDOM mg/dL 248* 276* 412*     BETA-HYDROXYBUTYRATE   Date Value Ref Range Status   12/01/2019 0 3 <0 6 mmol/L Final     Results from last 7 days   Lab Units 08/25/20  0043   TROPONIN I ng/mL <0 02     Results from last 7 days   Lab Units 08/25/20  0528   TSH 3RD GENERATON uIU/mL 1 439     Results from last 7 days   Lab Units 08/25/20  0528   PROCALCITONIN ng/ml 0 36*     Results from last 7 days   Lab Units 08/25/20  0043   NT-PRO BNP pg/mL 34,230*     Results from last 7 days   Lab Units 08/25/20  0113 08/25/20  0107   BLOOD CULTURE  No Growth at 24 hrs  No Growth at 24 hrs      8/25  Cxr= 1  Dense right lower lobe pneumonia  Patchy opacities in the left could represent other foci of infection  2   Question background pulmonary vascular congestion though focal distribution of consolidations speaks against alveolar edema    Ekg= Normal sinus rhythm  Possible Left atrial enlargement  Left ventricular hypertrophy  Nonspecific ST and T wave abnormality    ED Treatment:   Medication Administration from 08/25/2020 0028 to 08/25/2020 0227       Date/Time Order Dose Route Action     08/25/2020 0051 nitroGLYcerin (TRIDIL) 50 mg in 250 mL infusion (premix) 100 mcg/min Intravenous New Bag     08/25/2020 0047 nitroGLYcerin (TRIDIL) 50 mg in 250 mL infusion (premix) 100 mcg/min Intravenous New Bag     08/25/2020 0121 methylPREDNISolone sodium succinate (FOR EMS ONLY) (Solu-MEDROL) 125 MG injection 125 mg 0 mg Does not apply Given to EMS     08/25/2020 0121 nitroglycerin (FOR EMS ONLY) (NITRO-BID) 2 % TD ointment 1 inch 0 inch Does not apply Given to EMS     08/25/2020 0121 magnesium sulfate (FOR EMS ONLY) 2 g/50 mL IVPB (premix) 2 g 0 g Does not apply Given to EMS     08/25/2020 0120 cefepime (MAXIPIME) IVPB (premix) 1,000 mg 50 mL 1,000 mg Intravenous New Bag     08/25/2020 0212 vancomycin 750 mg in sodium chloride 0 9% 250 mL 750 mg Intravenous New Bag     08/25/2020 0157 furosemide (LASIX) injection 40 mg 40 mg Intravenous Given        Past Medical History:   Diagnosis Date    Bacteremia 12/21/2018    CAD (coronary artery disease)     s/p MARC to LCx, pLAD 2/2018    Cardiac arrest ([facility] )     Chronic kidney disease     Diabetes mellitus type 1 ([facility] )     Dialysis patient ([facility] )     Access in right chest    GI bleed     Hyperlipidemia     Hypertension     Infection at site of external fixator pin ([facility]     MI (myocardial infarction) ([place] [address] )     Pneumonia     Last Assessed [id number]    Renal failure     Renal transplant, status post 07/21/2007     Present on Admission:   Acute pulmonary edema ([facility] )   Depressed left ventricular ejection fraction   Type 1 diabetes mellitus ([facility] )   Hypertensive urgency    Admitting Diagnosis: Acute pulmonary edema (HCC) [J81 0]  Pneumonia [J18 9]  Respiratory distress [R06 03]  ESRD (end stage renal disease) on dialysis ([facility] ) [N18 6, Z99 2]  Acute respiratory failure with hypoxia ([place] [address] ) [J96 01]  Age/Sex: 47 y o  male  Admission Orders:  HD 3x/wk  Consult renal  Scd/foot pumps  accuchecks with coverage scale  Consult cardio    Scheduled Medications:  aspirin, 81 mg, Oral, QAM  atorvastatin, 80 mg, Oral, After Dinner  azaTHIOprine, 50 mg, Oral, QAM  calcium carbonate, 500 mg, Oral, BID  cefepime, 1,000 mg, Intravenous, Q24H  cholecalciferol, 1,000 Units, Oral, QAM  furosemide, 40 mg, Intravenous, BID (diuretic)  heparin (porcine), 5,000 Units, Subcutaneous, Q8H [address]  insulin detemir, 7 Units, Subcutaneous, BID  insulin lispro, 1-5 Units, Subcutaneous, TID AC  insulin lispro, 1-5 Units, Subcutaneous, HS  isosorbide mononitrate, 30 mg, Oral, Daily  metoprolol succinate, 50 mg, Oral, QAM  NIFEdipine, 90 mg, Oral, Daily  pantoprazole, 40 mg, Oral, Early Morning  predniSONE, 5 mg, Oral, Daily  tacrolimus, 1 mg, Oral, TID  vancomycin, 10 mg/kg, Intravenous, Once per day Tue Thu Sat    Continuus IV Infusions:     PRN Meds:  albuterol, 2 5 mg, Nebulization, Q4H PRN  ondansetron, 4 mg, Intravenous, Q4H PRN  sodium chloride (PF), 3 mL, Intravenous, Q1H PRN    Network Utilization Review Department  [email or web address]  ATTENTION: Please call with any questions or concerns to [phone] and carefully listen to the prompts so that you are directed to the right person  All voicemails are confidential   Rao Delude all requests for admission clinical reviews, approved or denied determinations and any other requests to dedicated fax number below belonging to the campus where the patient is receiving treatment   List of dedicated fax numbers for the Facilities:  [address] [place] [address] DENIALS (Administrative/Medical Necessity) [phone]   [address] (Maternity/NICU/Pediatrics) [phone]   Felicita Worthington [phone]   Serena Miller [phone]   [place]ao  [phone]   Yonatan Stephens [phone]   [address] [phone]   [facility]  [phone]   [address] [facility]  [address] [facility] [address] [facility] [phone]

## 2020-08-26 NOTE — PROGRESS NOTES
Vancomycin IV Pharmacy-to-Dose Consultation    Lakshmi Lynn is a 47 y o  male who is currently receiving Vancomycin IV with management by the Pharmacy Consult service  Assessment/Plan:  The patient was reviewed  Renal function is stable and no signs or symptoms of nephrotoxicity and/or infusion reactions were documented in the chart  Based on todays assessment, continue current vancomycin (day # 2) dosing of 500mg Tues, Thurs, Sat after HD, with a plan for trough to be drawn at 0600 on 8/29/20  We will continue to follow the patients culture results and clinical progress daily      Mukesh Cadet, Pharmacist

## 2020-08-26 NOTE — QUICK NOTE
Patient with 1 of 2 blood cultures positive for gram + organism in clusters  Although I feel this represents a contaminant, the patient is immunocompromised  Therefore I will repeat cultures x 2 and follow up initial blood cultures

## 2020-08-26 NOTE — ASSESSMENT & PLAN NOTE
Lab Results   Component Value Date    HGBA1C 4 8 10/11/2019       Recent Labs     08/25/20  1605 08/25/20 1959 08/25/20 2059 08/26/20  0706   POCGLU 185* 367* 429* 239*       Blood Sugar Average: Last 72 hrs:  (P) 278 [id number]   · Continue Levemir  · Will order insulin sliding scale

## 2020-08-26 NOTE — PROGRESS NOTES
Progress Note - Nephrology   Cristela Ordonez 47 y o  male MRN: [id number]  Unit/Bed#:  Encounter: [id number]    7  End-stage renal disease              Will continue hemodialysis sessions Tuesday Thursday Saturday in the inpatient setting, hemodialysis was done yesterday with 2 5 L ultrafiltration without any adverse events   His is scheduled to have hemodialysis tomorrow  2  Secondary hyperparathyroidism              Patient currently on diet control and does not require phosphorus binders  Continue closely monitor phosphorus in the inpatient setting  3  History of living related renal transplant on chronic immunosuppression              Continue current medications, patient currently on tacrolimus and azathioprine  If indicated, and if the patient's clinical status deteriorates we should hold Azathioprine  Patient also is on daily dose of prednisone, depending on his clinical course, he may also require stress dose hydrocortisone IV  Continue monitor hemodynamics closely  4  Hypertension setting of end-stage renal disease              Continue current medications,    5  Respiratory distress with hypoxia               Improved with oxygen via nasal cannula, after ultrafiltration yesterday patient got off BiPAP and maintaining oxygenation well   will try to ultrafilter as tolerated, between 2-3 L with hemodialysis tomorrow  Patient potentially has a right-sided pneumonia, currently on treatment with vancomycin and cefepime  Adjust does as far end-stage renal disease    6  Diabetic nephropathy    7   Diabetes mellitus type 1      Follow up reason for today's visit: ESRD/HD/SOB, Fluid overload/Renal transplant on immunosuppression    Respiratory distress    Patient Active Problem List   Diagnosis    Osteomyelitis ([address] )    Foot pain    Type 1 diabetes mellitus ([address] )    Renal transplant, status post    Sepsis ([address] )    Acute kidney injury ([address] )    Osteomyelitis ([address] )    Poor circulation  Closed fracture of distal end of right tibia with routine healing    Chronic kidney disease, stage IV (severe) ([facility])    Chronic osteomyelitis of tibia (HCC)    [facility]uppression ([facility]    Hypertension    DKA (diabetic ketoacidoses) (HCC)    Elevated troponin    Diarrhea    Cellulitis of ankle    Non-ST elevation myocardial infarction (NSTEMI), type 2    Urinary retention    Severe sepsis (HCC)    Acute renal failure (ARF) (HCC)    Acute kidney injury superimposed on CKD (HCC)    Metabolic acidosis    Hyperphosphatemia    Gastrointestinal hemorrhage    Bacteremia    S/P AKA (above knee amputation), right (HCC)    Acute blood loss anemia    Acute respiratory failure with hypoxia (HCC)    Pulmonary hypertension (HCC)    Chronic anemia    Depressed left ventricular ejection fraction    Acute pulmonary edema (HCC)    Hx of right BKA ([facility])    Hypertensive urgency    ESRD (end stage renal disease) on dialysis ([facility] )    Coronary artery disease involving native coronary artery of native heart without angina pectoris    Pre-operative cardiovascular examination    Chronic kidney disease, unspecified    Lesion of pancreas    Abnormal CT scan, colon    Respiratory distress    Community acquired pneumonia of right lower lobe of lung         Subjective:   Feeling much better, ready to go home after HD tomorrow    Objective:     Vitals: Blood pressure 132/63, pulse 70, temperature 99 4 °F (37 4 °C), temperature source Temporal, resp  rate 16, height 5' 4" (1 626 m), weight 50 8 kg (112 lb), SpO2 95 %  ,Body mass index is 19 22 kg/m²  Weight (last 2 days)     Date/Time   Weight    08/25/20 0242   50 8 (112)    08/25/20 0040   53 8 (118 61)                Intake/Output Summary (Last 24 hours) at 8/26/2020 1047  Last data filed at 8/26/2020 0528  Gross per 24 hour   Intake 430 ml   Output 30 ml   Net 400 ml     I/O last 3 completed shifts: In: 6508 [P O :160; I V :656;  Other:200; IV Piggyback:100]  Out: 30 [Urine:30]         Physical Exam: /63 (BP Location: Left leg)   Pulse 70   Temp 99 4 °F (37 4 °C) (Temporal)   Resp 16   Ht 5' 4" (1 626 m)   Wt 50 8 kg (112 lb)   SpO2 95%   BMI 19 22 kg/m²     General Appearance:    Alert, cooperative, no distress, appears stated age   Head:    Normocephalic, without obvious abnormality, atraumatic   Eyes:    Conjunctiva/corneas clear   Ears:    Normal external ears   Nose:   Nares normal, septum midline, mucosa normal, no drainage    or sinus tenderness   Throat:   Lips, mucosa, and tongue normal; teeth and gums normal   Neck:   Supple, symmetrical, trachea midline, no adenopathy;        thyroid:  No enlargement/tenderness/nodules; no carotid    bruit or JVD   Back:     Symmetric, no curvature, ROM normal, no CVA tenderness   Lungs: Few rhonchi with bronchial breath sounds auscultation bilaterally, respirations unlabored   Chest wall:    No tenderness or deformity   Heart:    Regular rate and rhythm, S1 and S2 normal, no murmur, rub   or gallop   Abdomen:     Soft, non-tender, bowel sounds active   Extremities:   Right AKA, Extremities normal, atraumatic, no cyanosis or edema   Skin:   Skin color, texture, turgor normal, no rashes or lesions   Lymph nodes:   Cervical normal   Neurologic:   CNII-XII intact      Access : right upper extremity AV fistula thrill 3+      Lab, Imaging and other studies: I have personally reviewed pertinent labs    CBC:   Lab Results   Component Value Date    WBC 8 21 08/26/2020    HGB 9 9 (L) 08/26/2020    HCT 30 5 (L) 08/26/2020    MCV 96 08/26/2020     08/26/2020    MCH 31 0 08/26/2020    MCHC 32 5 08/26/2020    RDW 13 2 08/26/2020    MPV 8 6 (L) 08/26/2020    NRBC 0 08/26/2020     CMP:   Lab Results   Component Value Date    K 4 2 08/26/2020    CL 98 (L) 08/26/2020    CO2 27 08/26/2020    BUN 49 (H) 08/26/2020    CREATININE 5 72 (H) 08/26/2020    CALCIUM 8 4 08/26/2020    AST 8 08/26/2020    ALT 13 08/26/2020    ALKPHOS 164 (H) 08/26/2020    EGFR 11 08/26/2020         Results from last 7 days   Lab Units 08/26/20  0514 08/25/20  0528 08/25/20  0043   POTASSIUM mmol/L 4 2 5 1 5 2   CHLORIDE mmol/L 98* 96* 94*   CO2 mmol/L 27 22 24   BUN mg/dL 49* 63* 61*   CREATININE mg/dL 5 72* 7 48* 7 27*   CALCIUM mg/dL 8 4 8 4 8 6   ALK PHOS U/L 164* 183*  --    ALT U/L 13 11*  --    AST U/L 8 12  --          Phosphorus:   Lab Results   Component Value Date    PHOS 4 4 08/26/2020     Magnesium:   Lab Results   Component Value Date    MG 2 6 08/26/2020     Urinalysis: No results found for: COLORU, CLARITYU, SPECGRAV, PHUR, LEUKOCYTESUR, NITRITE, PROTEINUA, GLUCOSEU, KETONESU, BILIRUBINUR, BLOODU  Ionized Calcium: No results found for: CAION  Coagulation: No results found for: PT, INR, APTT  Troponin: No results found for: TROPONINI  ABG: No results found for: PHART, BUW6OKK, PO2ART, KIM6ZXF, S8HZMPZK, BEART, SOURCE  Radiology review:     IMAGING  Procedure: X-ray Chest 1 View Portable    Result Date: 8/25/2020  Narrative: CHEST INDICATION:   Shortness of breath  Patient has suspected COVID-19  COMPARISON:  12/1/2019 and 6/9/2020  EXAM PERFORMED/VIEWS:  XR CHEST PORTABLE FINDINGS: Dense consolidation at the right lower lung field  Lack of silhouetting of the cardiac border implies lower lobe location  Patchy air opacities demonstrated peripherally in the left lateral lower lung field  Effusion on either side  No pneumothorax  Heart is mildly enlarged but unchanged  Pulmonary vasculature is partially obscured  Question mild cephalization  Bones are unremarkable  Impression: 1  Dense right lower lobe pneumonia  Patchy opacities in the left could represent other foci of infection  2   Question background pulmonary vascular congestion though focal distribution of consolidations speaks against alveolar edema  Note: I agree with the preliminary interpretation by the ED care provider documented in Epic   Workstation performed: [id number]       Current Facility-Administered Medications   Medication Dose Route Frequency    albuterol inhalation solution 2 5 mg  2 5 mg Nebulization Q4H PRN    aspirin chewable tablet 81 mg  81 mg Oral QAM    atorvastatin (LIPITOR) tablet 80 mg  80 mg Oral After Dinner    azaTHIOprine (IMURAN) tablet 50 mg  50 mg Oral QAM    calcium carbonate (TUMS) chewable tablet 500 mg  500 mg Oral BID    cefepime (MAXIPIME) IVPB (premix) 1,000 mg 50 mL  1,000 mg Intravenous Q24H    cholecalciferol (VITAMIN D3) tablet 1,000 Units  1,000 Units Oral QAM    furosemide (LASIX) injection 40 mg  40 mg Intravenous BID (diuretic)    heparin (porcine) subcutaneous injection 5,000 Units  5,000 Units Subcutaneous Q8H [address]    insulin detemir (LEVEMIR) subcutaneous injection 7 Units  7 Units Subcutaneous BID    insulin lispro (HumaLOG) 100 units/mL subcutaneous injection 1-5 Units  1-5 Units Subcutaneous TID AC    insulin lispro (HumaLOG) 100 units/mL subcutaneous injection 1-5 Units  1-5 Units Subcutaneous HS    isosorbide mononitrate (IMDUR) 24 hr tablet 30 mg  30 mg Oral Daily    metoprolol succinate (TOPROL-XL) 24 hr tablet 50 mg  50 mg Oral QAM    NIFEdipine (PROCARDIA XL) 24 hr tablet 90 mg  90 mg Oral Daily    ondansetron (ZOFRAN) injection 4 mg  4 mg Intravenous Q4H PRN    pantoprazole (PROTONIX) EC tablet 40 mg  40 mg Oral Early Morning    predniSONE tablet 5 mg  5 mg Oral Daily    sodium chloride (PF) 0 9 % injection 3 mL  3 mL Intravenous Q1H PRN    tacrolimus (PROGRAF) capsule 1 mg  1 mg Oral TID    vancomycin (VANCOCIN) 500 mg in sodium chloride 0 9 % 100 mL IVPB  10 mg/kg Intravenous Once per day on Tue Thu Sat     Medications Discontinued During This Encounter   Medication Reason    magnesium sulfate (FOR EMS ONLY) 1 g/2 mL injection 1 g     hydrALAZINE (APRESOLINE) 50 mg tablet Discontinued by another clinician    polyethylene glycol-electrolytes (TriLyte) 4000 mL solution Therapy completed    cefepime (MAXIPIME) IVPB (premix) 1,000 mg 50 mL     metroNIDAZOLE (FLAGYL) IVPB (premix) 500 mg 100 mL     carvedilol (COREG) tablet 6 25 mg     piperacillin-tazobactam (ZOSYN) 2 25 g in sodium chloride 0 9 % 50 mL IVPB     cefepime (MAXIPIME) IVPB (premix) 2,000 mg 50 mL     vancomycin 750 mg in sodium chloride 0 9% 250 mL     vancomycin (VANCOCIN) 500 mg in sodium chloride 0 9% 100 mL IVPB     tacrolimus (PROGRAF) capsule 2 mg     tacrolimus (PROGRAF) capsule 3 mg     tacrolimus (PROGRAF) 1 mg capsule Discontinued by another clinician    nitroGLYcerin (TRIDIL) 50 mg in 250 mL infusion (premix)        Rosalba Reid MD      This progress note was produced in part using a dictation device which may document imprecise wording from author's original intent

## 2020-08-26 NOTE — UTILIZATION REVIEW
Notification of Inpatient Admission/Inpatient Authorization Request   This is a Notification of Inpatient Admission for [address]  Be advised that this patient was admitted to our facility under Inpatient Status  Contact Jorgito Linder at [phone] for additional admission information  [facility] DEPT  DEDICATED -[phone]  Patient Name:   Ama Travis   YOB: 1970       [address]   [address]:   [address]  Tax ID: [id number]  NPI: [id number] Attending Provider/NPI:  Phone:  Address: Remigio Cooper, [address] [[id number]]  [phone]  Same as BILLIE/Dino Rolle [address] of Service Code: 24 Place of Service Name:  [address] [place] [address]   Start Date: 8/25/20 0140 Discharge Date & Time: No discharge date for patient encounter  Type of Admission: Inpatient Status Discharge Disposition   (if discharged): Home/Self Care   Patient Diagnoses: Acute pulmonary edema ([address] ) [J81 0]  Pneumonia [J18 9]  Respiratory distress [R06 03]  ESRD (end stage renal disease) on dialysis ([address] ) [N18 6, Z99 2]  Acute respiratory failure with hypoxia ([address] ) [J96 01]     Orders: Admission Orders (From admission, onward)     Ordered        08/25/20 0140  Inpatient Admission  Once                    Assigned Utilization Review Contact: Jorgito Linder  Utilization   Network Utilization Review Department  Phone: [phone]; Fax [phone]  Email: [email or web address]   ATTENTION PAYERS: Please call the assigned Utilization  directly with any questions or concerns ALL voicemails in the department are confidential  Send all requests for admission clinical reviews, approved or denied determinations and any other requests to dedicated fax number belonging to the campus where the patient is receiving treatment

## 2020-08-26 NOTE — ASSESSMENT & PLAN NOTE
Patient received Zosyn was transitioned to vancomycin and cefepime  Blood cultures x2 are negative  COVID negative  Urine Legionella pneumococcal antigen negative  Continue vancomycin and cefepime in the setting of immunosuppression and plan to discharge on doxycycline to complete 7 days

## 2020-08-26 NOTE — PLAN OF CARE
RA, oob to chair w/wc and stand to pivot, will have dialysis Thursday, improved lung sounds w/good air flow

## 2020-08-26 NOTE — TELEPHONE ENCOUNTER
Called patient, as per recall to schedule Colonoscopy  He does not want to schedule colonoscopy  He will contact our office if he changes his mind

## 2020-08-26 NOTE — PROGRESS NOTES
Cardiology Progress Note - Carol Corona 47 y o  male MRN: [id number]    Unit/Bed#:  Encounter: [id number]    Assesment/ Plan:  1  Flash pulmonary edema, likely secondary to labile blood pressures and volume overload with end-stage renal disease  2  Possible right lower lobe pneumonia  3  End-stage renal disease on dialysis  4  Coronary disease stable without angina  5  Labile hypertension  6  Hyperlipidemia  7  Chronic diastolic congestive heart failure  8  Type 1 diabetes    Recommendations:  Overall blood pressure much better controlled  Add isosorbide mononitrate 30 mg daily for treatment of small vessel disease and help to stabilize blood pressures  Continue fluid removal based on dialysis  Echocardiogram was personally reviewed no significant change prior  Coronary disease stable with no complaints of angina  Continue other cardiac medications can follow up with his regular cardiologist upon discharge  Continue with low-sodium diet  Please call if any questions    Subjective:    No significant events overnight  Feels great today denies any chest pain, shortness of breath, palpitations, lightheadedness, dizziness, or syncope  He has been taken off the nitro drip  No lower extremity edema tolerated dialysis well    Review of Systems   Constitution: Negative  HENT: Negative  Eyes: Negative  Cardiovascular: Negative  Respiratory: Negative  Endocrine: Negative  Hematologic/Lymphatic: Negative  Skin: Negative  Musculoskeletal: Negative  Gastrointestinal: Negative  Genitourinary: Negative  Neurological: Negative  Psychiatric/Behavioral: Negative  All other systems reviewed and are negative  Objective:   Vitals: Blood pressure 132/63, pulse 70, temperature 99 4 °F (37 4 °C), temperature source Temporal, resp  rate 16, height 5' 4" (1 626 m), weight 50 8 kg (112 lb), SpO2 95 %  , Body mass index is 19 22 kg/m² ,   Orthostatic Blood Pressures      Most Recent Value   Blood Pressure  132/63 filed at 2020 7381   Patient Position - Orthostatic VS  Lying filed at 2020 8977         Systolic (69LTN), OWQ:945 , Min:109 , WK     Diastolic (89RVB), VQU:87, Min:51, Max:68      Intake/Output Summary (Last 24 hours) at 2020 1047  Last data filed at 2020 0528  Gross per 24 hour   Intake 430 ml   Output 30 ml   Net 400 ml     Weight (last 2 days)     Date/Time   Weight    20 0242   50 8 (112)    20 0040   53 8 (118 61)                Telemetry Review: No significant arrhythmias seen on telemetry review  EKG personally reviewed by Glen Summers DO  Physical Exam  Vitals signs and nursing note reviewed  Constitutional:       General: He is not in acute distress  Appearance: He is well-developed  HENT:      Head: Normocephalic and atraumatic  Eyes:      Conjunctiva/sclera: Conjunctivae normal       Pupils: Pupils are equal, round, and reactive to light  Neck:      Musculoskeletal: Neck supple  Cardiovascular:      Rate and Rhythm: Normal rate and regular rhythm  Heart sounds: Normal heart sounds  No murmur  No friction rub  Pulmonary:      Effort: Pulmonary effort is normal  No respiratory distress  Breath sounds: Normal breath sounds  No wheezing or rales  Abdominal:      General: Bowel sounds are normal  There is no distension  Palpations: Abdomen is soft  Tenderness: There is no abdominal tenderness  There is no rebound  Musculoskeletal: Normal range of motion  General: No tenderness or deformity  Skin:     General: Skin is warm and dry  Findings: No erythema  Neurological:      Mental Status: He is alert and oriented to person, place, and time  Cranial Nerves: No cranial nerve deficit             Laboratory Results:  Results from last 7 days   Lab Units 20  0043   TROPONIN I ng/mL <0 02       CBC with diff:   Results from last 7 days   Lab Units 20  0514 20  0528 20  0043   WBC Thousand/uL 8 21 11 78* 8 55   HEMOGLOBIN g/dL 9 9* 9 8* 11 0*   HEMATOCRIT % 30 5* 30 4* 33 4*   MCV fL 96 95 95   PLATELETS Thousands/uL 218 238 271   MCH pg 31 0 30 6 31 3   MCHC g/dL 32 5 32 2 32 9   RDW % 13 2 12 9 13 0   MPV fL 8 6* 8 7* 8 4*   NRBC AUTO /100 WBCs 0  --  0         CMP:  Results from last 7 days   Lab Units 20  0514 20  0528 20  0043   POTASSIUM mmol/L 4 2 5 1 5 2   CHLORIDE mmol/L 98* 96* 94*   CO2 mmol/L 27 22 24   BUN mg/dL 49* 63* 61*   CREATININE mg/dL 5 72* 7 48* 7 27*   CALCIUM mg/dL 8 4 8 4 8 6   AST U/L 8 12  --    ALT U/L 13 11*  --    ALK PHOS U/L 164* 183*  --    EGFR ml/min/1 73sq m 11 8 8         BMP:  Results from last 7 days   Lab Units 20  0514 20  0528 20  0043   POTASSIUM mmol/L 4 2 5 1 5 2   CHLORIDE mmol/L 98* 96* 94*   CO2 mmol/L 27 22 24   BUN mg/dL 49* 63* 61*   CREATININE mg/dL 5 72* 7 48* 7 27*   CALCIUM mg/dL 8 4 8 4 8 6       BNP: No results for input(s): BNP in the last 72 hours      Magnesium:   Results from last 7 days   Lab Units 20  0514 20  0043   MAGNESIUM mg/dL 2 6 3 2*       Coags:       TSH:        Hemoglobin A1C       Lipid Profile:       Cardiac testing:   Results for orders placed during the hospital encounter of 20   Echo complete with contrast if indicated    Narrative [address] [facility] [address] 34 [phone]    Transthoracic Echocardiogram  2D, M-mode, Doppler, and Color Doppler    Study date:  25-Aug-2020    Patient: Maynor Swan  MR number: [id number]  Account number: [de-identified]  : 1970  Age: 47 years  Gender: Male  Status: Inpatient  Location: Bedside  Height: 64 in  Weight: 112 lb  BP: 118/ 56 mmHg    Indications: Acute respiratory failure    Diagnoses: J96 90 - Respiratory failure, unspecified, unspecified whether with hypoxia or hypercapnia    Sonographer:  Bard Svetlana JONES, [facility]  Primary Physician:  Eddie Troncoso DO  Referring Physician:  Daren Holloway DO  Group:  [facility]  Interpreting Physician:  Margoth Doshi DO    SUMMARY    LEFT VENTRICLE:  Systolic function was normal  Ejection fraction was estimated to be 65 %  There were no regional wall motion abnormalities  Wall thickness was moderately increased  Features were consistent with a pseudonormal left ventricular filling pattern, with concomitant abnormal relaxation and increased filling pressure (grade 2 diastolic dysfunction)  LEFT ATRIUM:  The atrium was mildly to moderately dilated  MITRAL VALVE:  There was mild to moderate regurgitation  TRICUSPID VALVE:  There was mild regurgitation  HISTORY: PRIOR HISTORY: End stage renal disease, diabetes    PROCEDURE: The procedure was performed at the bedside  This was a routine study  The transthoracic approach was used  The study included complete 2D imaging, M-mode, complete spectral Doppler, and color Doppler  The heart rate was 80 bpm,  at the start of the study  Image quality was adequate  LEFT VENTRICLE: Size was normal  Systolic function was normal  Ejection fraction was estimated to be 65 %  There were no regional wall motion abnormalities  Wall thickness was moderately increased  DOPPLER: Features were consistent with a  pseudonormal left ventricular filling pattern, with concomitant abnormal relaxation and increased filling pressure (grade 2 diastolic dysfunction)  RIGHT VENTRICLE: The size was normal  Systolic function was normal  Wall thickness was normal     LEFT ATRIUM: The atrium was mildly to moderately dilated  RIGHT ATRIUM: Size was normal     MITRAL VALVE: Valve structure was normal  There was normal leaflet separation  DOPPLER: The transmitral velocity was within the normal range  There was no evidence for stenosis  There was mild to moderate regurgitation  AORTIC VALVE: The valve was trileaflet   Leaflets exhibited normal thickness and normal cuspal separation  DOPPLER: Transaortic velocity was within the normal range  There was no evidence for stenosis  There was no regurgitation  TRICUSPID VALVE: The valve structure was normal  There was normal leaflet separation  DOPPLER: The transtricuspid velocity was within the normal range  There was no evidence for stenosis  There was mild regurgitation  PULMONIC VALVE: Leaflets exhibited normal thickness, no calcification, and normal cuspal separation  DOPPLER: The transpulmonic velocity was within the normal range  There was no regurgitation  PERICARDIUM: There was no pericardial effusion  The pericardium was normal in appearance  AORTA: The root exhibited normal size  SYSTEMIC VEINS: IVC: The inferior vena cava was not well visualized  SYSTEM MEASUREMENT TABLES    2D  %FS: 45 68 %  Ao Diam: 2 5 cm  EDV(Teich): 94 59 ml  EF(Teich): 77 13 %  ESV(Teich): 21 63 ml  IVSd: 1 35 cm  LA Area: 24 37 cm2  LA Diam: 4 61 cm  LVEDV MOD A4C: 94 48 ml  LVEF MOD A4C: 74 4 %  LVESV MOD A4C: 24 18 ml  LVIDd: 4 54 cm  LVIDs: 2 47 cm  LVLd A4C: 8 18 cm  LVLs A4C: 6 48 cm  LVPWd: 1 18 cm  RA Area: 14 03 cm2  RVIDd: 2 79 cm  RWT: 0 52  SV MOD A4C: 70 29 ml  SV(Teich): 72 96 ml    CW  RAP: 0 mmHg  TR Vmax: 2 64 m/s  TR maxP 99 mmHg    MM  TAPSE: 3 23 cm    PW  E' Sept: 0 06 m/s  E/E' Sept: 24 91  MV A [place]: 1 14 m/s  MV Dec [place]: 6 71 m/s2  MV DecT: 206 46 ms  MV E [place]: 1 39 m/s  MV E/A Ratio: 1 21  RVSP: 27 99 mmHg    [employer] Accredited Echocardiography Laboratory    Prepared and electronically signed by    Mala May DO  Signed 25-Aug-2020 16:38:26       No results found for this or any previous visit  No results found for this or any previous visit  No results found for this or any previous visit      Meds/Allergies   all current active meds have been reviewed  Medications Prior to Admission   Medication    insulin detemir (LEVEMIR) 100 units/mL subcutaneous injection    insulin lispro (HumaLOG) 100 units/mL injection    albuterol (2 5 mg/3 mL) 0 083 % nebulizer solution    aspirin 81 mg chewable tablet    atorvastatin (LIPITOR) 80 mg tablet    azaTHIOprine (IMURAN) 50 mg tablet    calcium carbonate (TUMS) 500 mg chewable tablet    carvedilol (COREG) 6 25 mg tablet    Cholecalciferol 25 MCG (1000 UT) tablet    glucagon (GLUCAGON EMERGENCY) 1 MG injection    Lancets (ONETOUCH DELICA PLUS [id number]) MISC    metoprolol succinate (TOPROL-XL) 50 mg 24 hr tablet    NIFEdipine (PROCARDIA XL) 90 mg 24 hr tablet    ondansetron (ZOFRAN-ODT) 4 mg disintegrating tablet    ONE TOUCH ULTRA TEST test strip    pantoprazole (PROTONIX) 40 mg tablet    predniSONE 5 mg tablet    tacrolimus (PROGRAF) 1 mg capsule          Assessment:  Principal Problem:    Respiratory distress  Active Problems:    Type 1 diabetes mellitus (HCC)    Renal transplant, status post    S/P AKA (above knee amputation), right (HCC)    Depressed left ventricular ejection fraction    Acute pulmonary edema (HCC)    Hypertensive urgency    ESRD (end stage renal disease) on dialysis [facility])    Community acquired pneumonia of right lower lobe of lung            Counseling / Coordination of Care  Total floor / unit time spent today 20 minutes  Greater than 50% of total time was spent with the patient and / or family counseling and / or coordination of care  A description of the counseling / coordination of care:  Discussed with slim

## 2020-08-26 NOTE — PROGRESS NOTES
Progress Note - Hillary Laws 1970, 47 y o  male MRN: [id number]    Unit/Bed#:  Encounter: [id number]    Primary Care Provider: Tawana Tuttle DO   Date and time admitted to hospital: 8/25/2020 12:38 AM        Community acquired pneumonia of right lower lobe of lung  Assessment & Plan  Patient received Zosyn was transitioned to vancomycin and cefepime  Blood cultures x2 are negative  COVID negative  Urine Legionella pneumococcal antigen negative  Continue vancomycin and cefepime in the setting of immunosuppression and plan to discharge on doxycycline to complete 7 days    Acute pulmonary edema (HCC)  Assessment & Plan  · Resolved    ESRD (end stage renal disease) on dialysis [facility])  Assessment & Plan  · Nephrology input appreciated  · HD completed on 8/25/20  · HD tomorrow prior to DC     Renal transplant, status post  Assessment & Plan  · Continue azathioprine, tacrolimus and prednisone    Type 1 diabetes mellitus [facility])  Assessment & Plan  Lab Results   Component Value Date    HGBA1C 4 8 10/11/2019       Recent Labs     08/25/20  1605 08/25/20  1959 08/25/20 2059 08/26/20  0706   POCGLU 185* 367* 429* 239*       Blood Sugar Average: Last 72 hrs:  (P) 278 [id number]   · Continue Levemir  · Will order insulin sliding scale        Progress Note - Hillary Laws 47 y o  male MRN: [id number]    Unit/Bed#:  Encounter: [id number]        Subjective:   Patient seen and examined at bedside  Feels back to normal and feels wel    Objective:     Vitals:   Vitals:    08/26/20 0752   BP: 132/63   Pulse: 70   Resp: 16   Temp:    SpO2:      Body mass index is 19 22 kg/m²      Intake/Output Summary (Last 24 hours) at 8/26/2020 0930  Last data filed at 8/26/2020 0528  Gross per 24 hour   Intake 830 ml   Output 30 ml   Net 800 ml       Physical Exam:   /63 (BP Location: Left leg)   Pulse 70   Temp 99 4 °F (37 4 °C) (Temporal)   Resp 16   Ht 5' 4" (1 626 m)   Wt 50 8 kg (112 lb)   SpO2 95% BMI 19 22 kg/m²   General appearance: alert and oriented, in no acute distress  Head: Normocephalic, without obvious abnormality, atraumatic  Lungs: clear to auscultation bilaterally  Heart: regular rate and rhythm, S1, S2 normal, no murmur, click, rub or gallop  Abdomen: soft, non-tender; bowel sounds normal; no masses,  no organomegaly  Extremities:  Right BKA  Pulses: 2+ and symmetric  Neurologic: Grossly normal     Invasive Devices     Central Venous Catheter Line            CVC Central Lines 01/08/20 Double 230 days          Peripheral Intravenous Line            Peripheral IV 08/25/20 Left Arm 1 day    Peripheral IV 08/25/20 Left Hand 1 day          Line            Hemodialysis AV Fistula 02/11/20 Right Forearm 196 days                Results from last 7 days   Lab Units 08/26/20 0514 08/25/20  0528 08/25/20  0043   WBC Thousand/uL 8 21 11 78* 8 55   HEMOGLOBIN g/dL 9 9* 9 8* 11 0*   HEMATOCRIT % 30 5* 30 4* 33 4*   PLATELETS Thousands/uL 218 238 271       Results from last 7 days   Lab Units 08/26/20 0514 08/25/20 0528 08/25/20  0043   POTASSIUM mmol/L 4 2 5 1 5 2   CHLORIDE mmol/L 98* 96* 94*   CO2 mmol/L 27 22 24   BUN mg/dL 49* 63* 61*   CREATININE mg/dL 5 72* 7 48* 7 27*   CALCIUM mg/dL 8 4 8 4 8 6   ALK PHOS U/L 164* 183*  --    ALT U/L 13 11*  --    AST U/L 8 12  --        Medication Administration - last 24 hours from 08/25/2020 0930 to 08/26/2020 0930       Date/Time Order Dose Route Action Action by     08/25/2020 1409 nitroGLYcerin (TRIDIL) 50 mg in 250 mL infusion (premix) 0 mcg/min Intravenous Stopped Irma Angulo RN     08/25/2020 1327 nitroGLYcerin (TRIDIL) 50 mg in 250 mL infusion (premix) 50 mcg/min Intravenous Rate/Dose Change Irma Angulo RN     08/25/2020 1221 nitroGLYcerin (TRIDIL) 50 mg in 250 mL infusion (premix) 60 mcg/min Intravenous Rate/Dose Change Irma Angulo RN     08/25/2020 1031 nitroGLYcerin (TRIDIL) 50 mg in 250 mL infusion (premix) 65 mcg/min Intravenous Rate/Dose Change Rosario Vences RN     08/25/2020 1475 nitroGLYcerin (TRIDIL) 50 mg in 250 mL infusion (premix) 70 mcg/min Intravenous Rate/Dose Change Rosario Vences RN     08/26/2020 0488 predniSONE tablet 5 mg 5 mg Oral Not Given Rosario Vences, GERMAN     08/26/2020 0755 predniSONE tablet 5 mg 5 mg Oral Given Rosario Vences RN     08/26/2020 0527 pantoprazole (PROTONIX) EC tablet 40 mg 40 mg Oral Given Milena rFy RN     08/26/2020 6965 NIFEdipine (PROCARDIA XL) 24 hr tablet 90 mg 90 mg Oral Not Given Rosario Vences RN     08/26/2020 0753 NIFEdipine (PROCARDIA XL) 24 hr tablet 90 mg 90 mg Oral Given Rosario Vences RN     08/26/2020 1781 metoprolol succinate (TOPROL-XL) 24 hr tablet 50 mg 50 mg Oral Not Given Rosario Vences RN     08/26/2020 0754 metoprolol succinate (TOPROL-XL) 24 hr tablet 50 mg 50 mg Oral Given Rosario Vences RN     08/26/2020 0091 insulin detemir (LEVEMIR) subcutaneous injection 7 Units 7 Units Subcutaneous Not Given Rosario Vences RN     08/26/2020 0758 insulin detemir (LEVEMIR) subcutaneous injection 7 Units 7 Units Subcutaneous Given Rosario Vences, GERMAN     08/25/2020 1711 insulin detemir (LEVEMIR) subcutaneous injection 7 Units 7 Units Subcutaneous Given Rosario Vences RN     08/26/2020 6935 cholecalciferol (VITAMIN D3) tablet 1,000 Units 1,000 Units Oral Refused Rosario Vences RN     08/26/2020 9340 azaTHIOprine (IMURAN) tablet 50 mg 50 mg Oral Not Given Rosario Vences RN     08/26/2020 0753 azaTHIOprine (IMURAN) tablet 50 mg 50 mg Oral Given Rosario Vences RN     08/26/2020 7903 aspirin chewable tablet 81 mg 81 mg Oral Not Given Rosario Vences RN     08/26/2020 1152 aspirin chewable tablet 81 mg 81 mg Oral Given Rosario Vences RN     08/25/2020 1712 atorvastatin (LIPITOR) tablet 80 mg 80 mg Oral Given Rosario Vences RN     08/26/2020 6694 calcium carbonate (TUMS) chewable tablet 500 mg 500 mg Oral Refused Rosario Vences RN     08/25/2020 1711 calcium carbonate (TUMS) chewable tablet 500 mg 500 mg Oral Refused Cyndi [facility], RN     08/26/2020 0527 heparin (porcine) subcutaneous injection 5,000 Units 5,000 Units Subcutaneous Given Darshana Flaherty, RN     08/25/2020 2102 heparin (porcine) subcutaneous injection 5,000 Units 5,000 Units Subcutaneous Given Darshana Flaherty, RN     08/25/2020 1416 heparin (porcine) subcutaneous injection 5,000 Units 5,000 Units Subcutaneous Given Cyndi [facility], RN     08/26/2020 0755 insulin lispro (HumaLOG) 100 units/mL subcutaneous injection 1-5 Units 2 Units Subcutaneous Given Cyndi Manger, RN     08/25/2020 1645 insulin lispro (HumaLOG) 100 units/mL subcutaneous injection 1-5 Units 1 Units Subcutaneous Given Cyndi [facility], RN     08/25/2020 1210 insulin lispro (HumaLOG) 100 units/mL subcutaneous injection 1-5 Units 1 Units Subcutaneous Given Cyndi [facility], RN     08/26/2020 0755 furosemide (LASIX) injection 40 mg 40 mg [facility]     08/25/2020 1652 furosemide (LASIX) injection 40 mg 40 mg Intravenous Given Cyndi [facility], RN     08/25/2020 1209 cefepime (MAXIPIME) IVPB (premix) 2,000 mg 50 mL   Intravenous Canceled Entry [facility], RN     08/25/2020 1210 vancomycin 750 mg in sodium chloride 0 9% 250 mL   Intravenous Canceled Entry [facility], RN     08/25/2020 1209 vancomycin (VANCOCIN) 500 mg in sodium chloride 0 9% 100 mL IVPB   Intravenous Canceled Entry [facility], RN     08/25/2020 1200 vancomycin (VANCOCIN) 500 mg in sodium chloride 0 9 % 100 mL IVPB 0 mg Intravenous Stopped [facility], RN     08/25/2020 1130 vancomycin (VANCOCIN) 500 mg in sodium chloride 0 9 % 100 mL IVPB 500 mg Intravenous New Bag [facility], RN     08/26/2020 0752 tacrolimus (PROGRAF) capsule 1 mg 1 mg Oral Given Cyndi Reena, RN     08/25/2020 2000 tacrolimus (PROGRAF) capsule 1 mg 1 mg Oral Given Darshana Flaherty, RN     08/25/2020 1400 tacrolimus (PROGRAF) capsule 1 mg 1 mg Oral Given Slick Esparza RN     08/25/2020 2101 insulin lispro (HumaLOG) 100 units/mL subcutaneous injection 1-5 Units 4 Units Subcutaneous Given Kalpana Gallegos RN            Lab, Imaging and other studies: I have personally reviewed pertinent reports      VTE Pharmacologic Prophylaxis: Heparin  VTE Mechanical Prophylaxis: sequential compression device     Claudetta Fear, MD  8/26/2020,9:30 AM

## 2020-08-27 ENCOUNTER — APPOINTMENT (INPATIENT)
Dept: DIALYSIS | Facility: HOSPITAL | Age: 50
DRG: 189 | End: 2020-08-27
Attending: INTERNAL MEDICINE
Payer: COMMERCIAL

## 2020-08-27 VITALS
DIASTOLIC BLOOD PRESSURE: 85 MMHG | TEMPERATURE: 98 F | HEART RATE: 80 BPM | SYSTOLIC BLOOD PRESSURE: 191 MMHG | BODY MASS INDEX: 19.12 KG/M2 | WEIGHT: 112 LBS | HEIGHT: 64 IN | RESPIRATION RATE: 18 BRPM | OXYGEN SATURATION: 97 %

## 2020-08-27 LAB
ANION GAP SERPL CALCULATED.3IONS-SCNC: 11 MMOL/L (ref 4–13)
BASOPHILS # BLD AUTO: 0.04 THOUSANDS/ΜL (ref 0–0.1)
BASOPHILS NFR BLD AUTO: 1 % (ref 0–1)
BUN SERPL-MCNC: 73 MG/DL (ref 5–25)
CALCIUM SERPL-MCNC: 7.9 MG/DL (ref 8.3–10.1)
CHLORIDE SERPL-SCNC: 99 MMOL/L (ref 100–108)
CO2 SERPL-SCNC: 24 MMOL/L (ref 21–32)
CREAT SERPL-MCNC: 7.31 MG/DL (ref 0.6–1.3)
EOSINOPHIL # BLD AUTO: 0.25 THOUSAND/ΜL (ref 0–0.61)
EOSINOPHIL NFR BLD AUTO: 3 % (ref 0–6)
ERYTHROCYTE [DISTWIDTH] IN BLOOD BY AUTOMATED COUNT: 13.2 % (ref 11.6–15.1)
GFR SERPL CREATININE-BSD FRML MDRD: 8 ML/MIN/1.73SQ M
GLUCOSE SERPL-MCNC: 104 MG/DL (ref 65–140)
GLUCOSE SERPL-MCNC: 106 MG/DL (ref 65–140)
GLUCOSE SERPL-MCNC: 145 MG/DL (ref 65–140)
HCT VFR BLD AUTO: 29.4 % (ref 36.5–49.3)
HGB BLD-MCNC: 9.4 G/DL (ref 12–17)
IMM GRANULOCYTES # BLD AUTO: 0.02 THOUSAND/UL (ref 0–0.2)
IMM GRANULOCYTES NFR BLD AUTO: 0 % (ref 0–2)
LYMPHOCYTES # BLD AUTO: 2.61 THOUSANDS/ΜL (ref 0.6–4.47)
LYMPHOCYTES NFR BLD AUTO: 34 % (ref 14–44)
MCH RBC QN AUTO: 30.8 PG (ref 26.8–34.3)
MCHC RBC AUTO-ENTMCNC: 32 G/DL (ref 31.4–37.4)
MCV RBC AUTO: 96 FL (ref 82–98)
MONOCYTES # BLD AUTO: 0.73 THOUSAND/ΜL (ref 0.17–1.22)
MONOCYTES NFR BLD AUTO: 10 % (ref 4–12)
NEUTROPHILS # BLD AUTO: 4 THOUSANDS/ΜL (ref 1.85–7.62)
NEUTS SEG NFR BLD AUTO: 52 % (ref 43–75)
NRBC BLD AUTO-RTO: 0 /100 WBCS
PLATELET # BLD AUTO: 205 THOUSANDS/UL (ref 149–390)
PMV BLD AUTO: 8.5 FL (ref 8.9–12.7)
POTASSIUM SERPL-SCNC: 4.3 MMOL/L (ref 3.5–5.3)
RBC # BLD AUTO: 3.05 MILLION/UL (ref 3.88–5.62)
SODIUM SERPL-SCNC: 134 MMOL/L (ref 136–145)
WBC # BLD AUTO: 7.65 THOUSAND/UL (ref 4.31–10.16)

## 2020-08-27 PROCEDURE — 99232 SBSQ HOSP IP/OBS MODERATE 35: CPT | Performed by: INTERNAL MEDICINE

## 2020-08-27 PROCEDURE — 87040 BLOOD CULTURE FOR BACTERIA: CPT | Performed by: FAMILY MEDICINE

## 2020-08-27 PROCEDURE — 85025 COMPLETE CBC W/AUTO DIFF WBC: CPT | Performed by: FAMILY MEDICINE

## 2020-08-27 PROCEDURE — 99239 HOSP IP/OBS DSCHRG MGMT >30: CPT | Performed by: FAMILY MEDICINE

## 2020-08-27 PROCEDURE — 82948 REAGENT STRIP/BLOOD GLUCOSE: CPT

## 2020-08-27 PROCEDURE — 80048 BASIC METABOLIC PNL TOTAL CA: CPT | Performed by: FAMILY MEDICINE

## 2020-08-27 RX ORDER — DOXYCYCLINE 100 MG/1
100 TABLET ORAL 2 TIMES DAILY
Qty: 10 TABLET | Refills: 0 | Status: SHIPPED | OUTPATIENT
Start: 2020-08-27 | End: 2020-09-01

## 2020-08-27 RX ADMIN — FUROSEMIDE 40 MG: 10 INJECTION, SOLUTION INTRAMUSCULAR; INTRAVENOUS at 07:43

## 2020-08-27 RX ADMIN — NIFEDIPINE 90 MG: 30 TABLET, FILM COATED, EXTENDED RELEASE ORAL at 12:26

## 2020-08-27 RX ADMIN — AZATHIOPRINE 50 MG: 50 TABLET ORAL at 09:23

## 2020-08-27 RX ADMIN — TACROLIMUS 1 MG: 1 CAPSULE ORAL at 09:23

## 2020-08-27 RX ADMIN — METOPROLOL SUCCINATE 50 MG: 50 TABLET, EXTENDED RELEASE ORAL at 12:27

## 2020-08-27 RX ADMIN — HEPARIN SODIUM 5000 UNITS: 5000 INJECTION INTRAVENOUS; SUBCUTANEOUS at 05:26

## 2020-08-27 RX ADMIN — PREDNISONE 5 MG: 5 TABLET ORAL at 09:23

## 2020-08-27 RX ADMIN — VITAMIN D, TAB 1000IU (100/BT) 1000 UNITS: 25 TAB at 12:27

## 2020-08-27 RX ADMIN — PANTOPRAZOLE SODIUM 40 MG: 40 TABLET, DELAYED RELEASE ORAL at 05:26

## 2020-08-27 RX ADMIN — CEFEPIME HYDROCHLORIDE 1000 MG: 1 INJECTION, SOLUTION INTRAVENOUS at 11:02

## 2020-08-27 RX ADMIN — ASPIRIN 81 MG 81 MG: 81 TABLET ORAL at 12:28

## 2020-08-27 RX ADMIN — INSULIN DETEMIR 7 UNITS: 100 INJECTION, SOLUTION SUBCUTANEOUS at 09:23

## 2020-08-27 RX ADMIN — ISOSORBIDE MONONITRATE 30 MG: 30 TABLET, EXTENDED RELEASE ORAL at 12:28

## 2020-08-27 RX ADMIN — VANCOMYCIN HYDROCHLORIDE 500 MG: 500 INJECTION, POWDER, LYOPHILIZED, FOR SOLUTION INTRAVENOUS at 11:24

## 2020-08-27 NOTE — SOCIAL WORK
Pt signed out AMA today  I in basket messaged Dr Rick Fruits office to call patient at home for a follow up appointment  AVM and discharge instructions reviewed with patient with good understanding  Case Management reviewed discharge planning process including the following: identifying help that is needed at home, pt's preference for discharge needs and Meds at [facility]  Reviewed with Pt that any member of the healthcare team can answer questions regarding : medications, jmportance of recognizing  Signs and symptoms of any  medical problems  Case Management also encouraged pt to follow up with all recommended appointments after discharge

## 2020-08-27 NOTE — NURSING NOTE
Patient given discharge instructions  He was encouraged to stay another day signed out AMA   He said he has to go to work and will not stay

## 2020-08-27 NOTE — PROGRESS NOTES
Vancomycin IV Pharmacy-to-Dose Consultation    Maxi Barnett is a 47 y o  male who is currently receiving Vancomycin IV with management by the Pharmacy Consult service  Assessment/Plan:  The patient was reviewed  Patient will receive dialysis along with vancomycin today  Based on todays assessment, continue current vancomycin (day # 3) dosing of 500mg with each dialysis session, with a plan for trough to be drawn prior to dialysis on 08/29/2020    We will continue to follow the patients culture results and clinical progress daily      Iliana Serrano, Pharmacist

## 2020-08-27 NOTE — PLAN OF CARE
Pt stable on 3 hr HD tx using R AV fistula  Pre tx wt- 49 9 kg, post wt- 47 4 kg  Total UF removal- 2 5 L     No issues noted  Pt signing self out AMA post tx due to job issues  Report given to primary nurse           Goal- remove 2-3 L as tolerated      Problem: METABOLIC, FLUID AND ELECTROLYTES - ADULT  Goal: Electrolytes maintained within normal limits  Description: INTERVENTIONS:  - Monitor labs and assess patient for signs and symptoms of electrolyte imbalances  - Administer electrolyte replacement as ordered  - Monitor response to electrolyte replacements, including repeat lab results as appropriate  - Instruct patient on fluid and nutrition as appropriate  Outcome: Progressing

## 2020-08-27 NOTE — DISCHARGE SUMMARY
Discharge Summary - Denisse Cardona 47 y o  male MRN: [id number]    Unit/Bed#: CCU 80 Encounter: [id number]    Admission Date:   Admission Orders (From admission, onward)     Ordered        08/25/20 0140  Inpatient Admission  Once                     Admitting Diagnosis: Acute pulmonary edema ([facility] ) [J81 0]  Pneumonia [J18 9]  Respiratory distress [R06 03]  ESRD (end stage renal disease) on dialysis ([facility] ) [N18 6, Z99 2]  Acute respiratory failure with hypoxia ([facility] ) [J96 01]    HPI: Denisse Cardona is a 47 y o  male who presented to the ED for sudden onset shortness of breath   Approximately 1 hour prior to arrival to the ED, the patient started to experience sudden-onset shortness of breath and EMS was subsequently called    it was noted that he had crackles and he was given nitroglycerin paste and 2 g of IV magnesium  In the ED he was noted to be hypoxic and he was immediately placed on BiPAP  His oxygen saturations to the mid to upper 90s  ED physician discussed the case with Critical Care and it was recommended that the patient be admitted to the ICU on nitroglycerin drip and BiPAP  The patient already feels better after being placed on BiPAP  He denies any chest pain/nausea/vomiting/fevers/chills  Procedures Performed:   Orders Placed This Encounter   Procedures    Critical Care    ED ECG Documentation Only       Summary of Hospital Course:     Community-acquired pneumonia of right lower lobe   ESRD     Patient presents emergency room with worsening shortness of breath, found to have an infiltrate, and in pulmonary edema  He does make some urine and was given IV Lasix, and underwent hemodialysis with resolution of pulmonary edema and respiratory distress the next morning  Patient was initiated on IV Zosyn, transitioned to vancomycin and cefepime, 1 of 2 blood cultures obtained in the ED was positive for Gram-positive organism in clusters  Patient refused repeat blood cultures on 08/26/2020  But was agreeable on the following day  Patient stated that he will not be staying in the hospital because he has to get to work  I attempted to call the microbiology lab and assess at this was a coagulase negative Staph, I was told that the organism isolated is possibly micrococcus, but needed more time  I relayed my findings to the patient and made him aware that he is immunocompromised, patient stated that he has to get to work and decided to sign out against medical advice  Fortunately he did undergo hemodialysis, and received a dose of vancomycin and cefepime  I reached out to the patient's primary nephrologist made him aware of patient's decision  We agreed that I will follow the blood cultures over the next 24 hours and communicate with Nephrology, if patient were to require additional IV vancomycin he stated he would not return until Monday to the inpatient setting but is agreeable to receive vancomycin in hemodialysis on Saturday    I prescribed doxycycline for an additional 5 days on discharge       Significant Findings, Care, Treatment and Services Provided:     CXR    1   Dense right lower lobe pneumonia   Patchy opacities in the left could represent other foci of infection  2   Question background pulmonary vascular congestion though focal distribution of consolidations speaks against alveolar edema  Complications: none    Discharge Diagnosis: see abovd    Resolved Problems  Date Reviewed: 6/22/2020    None          Condition at Discharge: fair         Discharge instructions/Information to patient and family:   See after visit summary for information provided to patient and family  Provisions for Follow-Up Care:  See after visit summary for information related to follow-up care and any pertinent home health orders  PCP: Jaida Mantilla DO    Disposition: Left against medical advice    Planned Readmission: No      Discharge Statement   I spent 40 minutes discharging the patient  This time was spent on the day of discharge  I had direct contact with the patient on the day of discharge  Additional documentation is required if more than 30 minutes were spent on discharge  Discharge Medications:  See after visit summary for reconciled discharge medications provided to patient and family

## 2020-08-27 NOTE — PROGRESS NOTES
Progress Note - Nephrology   Baldev Kline 47 y o  male MRN: [id number]  Unit/Bed#:  Encounter: [id number]    A/P:  1  End-stage renal disease   Continue hemodialysis sessions Tuesday Thursday Saturday, patient will be placed on hemodialysis very shortly  Ultrafiltrate as tolerated  2  Secondary hyperparathyroidism   Continue monitor phosphorus another laboratory parameters, a phosphorus binders at this time  3  History of living related renal transplant on chronic immunosuppression   Patient appears to be responding well clinically, continue all medications including tacrolimus/Azathioprine/Prednisone  Patient did not require discontinuation or alteration of any medications during this inpatient stay  4  Hypertension setting of end-stage renal disease   Patient had episode of accelerated hypertension, appears improved at this time  Please refer below  5  Acute respiratory failure with possible flash pulmonary edema   Patient has had this issue in the past, may be related to accelerated hypertension  He was placed on isosorbide to assist in preventing this in the future, appreciate Cardiology input regarding this    6  Diabetic nephropathy      Follow up reason for today's visit:  End-stage renal disease/secondary hyperparathyroidism/immune suppression    Respiratory distress    Patient Active Problem List   Diagnosis    Osteomyelitis ([address] )    Foot pain    Type 1 diabetes mellitus ([address] )    Renal transplant, status post    Sepsis ([address] )    Acute kidney injury ([address] )    Osteomyelitis ([address]    Poor circulation    Closed fracture of distal end of right tibia with routine healing    Chronic kidney disease, stage IV (severe) ([address] )    Chronic osteomyelitis of tibia ([address] )    Immunosuppression ([address] )    Hypertension    DKA (diabetic ketoacidoses) (HCC)    Elevated troponin    Diarrhea    Cellulitis of ankle    Non-ST elevation myocardial infarction (NSTEMI), type 2    Urinary retention    Severe sepsis (HCC)    Acute renal failure (ARF) (HCC)    Acute kidney injury superimposed on CKD (HCC)    Metabolic acidosis    Hyperphosphatemia    Gastrointestinal hemorrhage    Bacteremia    S/P AKA (above knee amputation), right (HCC)    Acute blood loss anemia    Acute respiratory failure with hypoxia (HCC)    Pulmonary hypertension (HCC)    Chronic anemia    Depressed left ventricular ejection fraction    Acute pulmonary edema (HCC)    Hx of right BKA (HCC)    Hypertensive urgency    ESRD (end stage renal disease) on dialysis ([facility] )    Coronary artery disease involving native coronary artery of native heart without angina pectoris    Pre-operative cardiovascular examination    Chronic kidney disease, unspecified    Lesion of pancreas    Abnormal CT scan, colon    Respiratory distress    Community acquired pneumonia of right lower lobe of lung         Subjective:   No Acute events overnight, patient is eating and drinking appropriately  Objective:     Vitals: Blood pressure 170/77, pulse 69, temperature 98 °F (36 7 °C), temperature source Tympanic, resp  rate 18, height 5' 4" (1 626 m), weight 50 8 kg (112 lb), SpO2 97 %  ,Body mass index is 19 22 kg/m²  Weight (last 2 days)     Date/Time   Weight    08/25/20 0242   50 8 (112)    08/25/20 0040   53 8 (118 61)                Intake/Output Summary (Last 24 hours) at 8/27/2020 0842  Last data filed at 8/26/2020 2320  Gross per 24 hour   Intake 240 ml   Output    Net 240 ml     I/O last 3 completed shifts:   In: 370 [P O :310; I V :10; IV Piggyback:50]  Out: 30 [Urine:30]         Physical Exam: /77 (BP Location: Left arm)   Pulse 69   Temp 98 °F (36 7 °C) (Tympanic)   Resp 18   Ht 5' 4" (1 626 m)   Wt 50 8 kg (112 lb)   SpO2 97%   BMI 19 22 kg/m²     General Appearance:    Alert, cooperative, no distress, appears stated age   Head:    Normocephalic, without obvious abnormality, atraumatic   Eyes:    Conjunctiva/corneas clear Ears:    Normal external ears   Nose:   Nares normal, septum midline, mucosa normal, no drainage    or sinus tenderness   Throat:   Lips, mucosa, and tongue normal; teeth and gums normal   Neck:   Supple   Back:     Symmetric, no curvature, ROM normal, no CVA tenderness   Lungs:     Clear to auscultation bilaterally, respirations unlabored   Chest wall:    No tenderness or deformity   Heart:    Regular rate and rhythm, S1 and S2 normal, no murmur, rub   or gallop   Abdomen:     Soft, non-tender, bowel sounds active   Extremities:   Extremities normal, atraumatic, no cyanosis or edema   Skin:   Skin color, texture, turgor normal, no rashes or lesions   Lymph nodes:   Cervical normal   Neurologic:   CNII-XII intact            Lab, Imaging and other studies: I have personally reviewed pertinent labs  CBC:   Lab Results   Component Value Date    WBC 7 65 08/27/2020    HGB 9 4 (L) 08/27/2020    HCT 29 4 (L) 08/27/2020    MCV 96 08/27/2020     08/27/2020    MCH 30 8 08/27/2020    MCHC 32 0 08/27/2020    RDW 13 2 08/27/2020    MPV 8 5 (L) 08/27/2020    NRBC 0 08/27/2020     CMP:   Lab Results   Component Value Date    K 4 3 08/27/2020    CL 99 (L) 08/27/2020    CO2 24 08/27/2020    BUN 73 (H) 08/27/2020    CREATININE 7 31 (H) 08/27/2020    CALCIUM 7 9 (L) 08/27/2020    EGFR 8 08/27/2020           Results from last 7 days   Lab Units 08/27/20  0523 08/26/20  0514 08/25/20  0528   POTASSIUM mmol/L 4 3 4 2 5 1   CHLORIDE mmol/L 99* 98* 96*   CO2 mmol/L 24 27 22   BUN mg/dL 73* 49* 63*   CREATININE mg/dL 7 31* 5 72* 7 48*   CALCIUM mg/dL 7 9* 8 4 8 4   ALK PHOS U/L  --  164* 183*   ALT U/L  --  13 11*   AST U/L  --  8 12         Phosphorus: No results found for: PHOS  Magnesium: No results found for: MG  Urinalysis: No results found for: COLORU, CLARITYU, SPECGRAV, PHUR, LEUKOCYTESUR, NITRITE, PROTEINUA, GLUCOSEU, KETONESU, BILIRUBINUR, BLOODU  Ionized Calcium: No results found for: CAION  Coagulation: No results found for: PT, INR, APTT  Troponin: No results found for: TROPONINI  ABG: No results found for: PHART, FAG2POC, PO2ART, HSE5EPA, V8LCWDKH, BEART, SOURCE  Radiology review:     IMAGING  Procedure: X-ray Chest 1 View Portable    Result Date: 8/25/2020  Narrative: CHEST INDICATION:   Shortness of breath  Patient has suspected COVID-19  COMPARISON:  12/1/2019 and 6/9/2020  EXAM PERFORMED/VIEWS:  XR CHEST PORTABLE FINDINGS: Dense consolidation at the right lower lung field  Lack of silhouetting of the cardiac border implies lower lobe location  Patchy air opacities demonstrated peripherally in the left lateral lower lung field  Effusion on either side  No pneumothorax  Heart is mildly enlarged but unchanged  Pulmonary vasculature is partially obscured  Question mild cephalization  Bones are unremarkable  Impression: 1  Dense right lower lobe pneumonia  Patchy opacities in the left could represent other foci of infection  2   Question background pulmonary vascular congestion though focal distribution of consolidations speaks against alveolar edema  Note: I agree with the preliminary interpretation by the ED care provider documented in Epic   Workstation performed: [id number]       Current Facility-Administered Medications   Medication Dose Route Frequency    albuterol inhalation solution 2 5 mg  2 5 mg Nebulization Q4H PRN    aspirin chewable tablet 81 mg  81 mg Oral QAM    atorvastatin (LIPITOR) tablet 80 mg  80 mg Oral After Dinner    azaTHIOprine (IMURAN) tablet 50 mg  50 mg Oral QAM    calcium carbonate (TUMS) chewable tablet 500 mg  500 mg Oral BID    cefepime (MAXIPIME) IVPB (premix) 1,000 mg 50 mL  1,000 mg Intravenous Q24H    cholecalciferol (VITAMIN D3) tablet 1,000 Units  1,000 Units Oral QAM    furosemide (LASIX) injection 40 mg  40 mg Intravenous BID (diuretic)    heparin (porcine) subcutaneous injection 5,000 Units  5,000 Units Subcutaneous Q8H [facility]    insulin detemir (LEVEMIR) subcutaneous injection 7 Units  7 Units Subcutaneous BID    insulin lispro (HumaLOG) 100 units/mL subcutaneous injection 1-5 Units  1-5 Units Subcutaneous TID AC    insulin lispro (HumaLOG) 100 units/mL subcutaneous injection 1-5 Units  1-5 Units Subcutaneous HS    isosorbide mononitrate (IMDUR) 24 hr tablet 30 mg  30 mg Oral Daily    metoprolol succinate (TOPROL-XL) 24 hr tablet 50 mg  50 mg Oral QAM    NIFEdipine (PROCARDIA XL) 24 hr tablet 90 mg  90 mg Oral Daily    ondansetron (ZOFRAN) injection 4 mg  4 mg Intravenous Q4H PRN    pantoprazole (PROTONIX) EC tablet 40 mg  40 mg Oral Early Morning    predniSONE tablet 5 mg  5 mg Oral Daily    sodium chloride (PF) 0 9 % injection 3 mL  3 mL Intravenous Q1H PRN    tacrolimus (PROGRAF) capsule 1 mg  1 mg Oral TID    vancomycin (VANCOCIN) 500 mg in sodium chloride 0 9 % 100 mL IVPB  10 mg/kg Intravenous Once per day on Tue Thu Sat     Medications Discontinued During This Encounter   Medication Reason    magnesium sulfate (FOR EMS ONLY) 1 g/2 mL injection 1 g     hydrALAZINE (APRESOLINE) 50 mg tablet Discontinued by another clinician    polyethylene glycol-electrolytes (TriLyte) 4000 mL solution Therapy completed    cefepime (MAXIPIME) IVPB (premix) 1,000 mg 50 mL     metroNIDAZOLE (FLAGYL) IVPB (premix) 500 mg 100 mL     carvedilol (COREG) tablet 6 25 mg     piperacillin-tazobactam (ZOSYN) 2 25 g in sodium chloride 0 9 % 50 mL IVPB     cefepime (MAXIPIME) IVPB (premix) 2,000 mg 50 mL     vancomycin 750 mg in sodium chloride 0 9% 250 mL     vancomycin (VANCOCIN) 500 mg in sodium chloride 0 9% 100 mL IVPB     tacrolimus (PROGRAF) capsule 2 mg     tacrolimus (PROGRAF) capsule 3 mg     tacrolimus (PROGRAF) 1 mg capsule Discontinued by another clinician    nitroGLYcerin (TRIDIL) 50 mg in 250 mL infusion (premix)        Diane Aguilar,       This progress note was produced in part using a dictation device which may document imprecise wording from author's original intent

## 2020-08-28 ENCOUNTER — TELEPHONE (OUTPATIENT)
Dept: FAMILY MEDICINE CLINIC | Facility: CLINIC | Age: 50
End: 2020-08-28

## 2020-08-28 ENCOUNTER — TRANSITIONAL CARE MANAGEMENT (OUTPATIENT)
Dept: FAMILY MEDICINE CLINIC | Facility: CLINIC | Age: 50
End: 2020-08-28

## 2020-08-28 DIAGNOSIS — Z71.89 COMPLEX CARE COORDINATION: Primary | ICD-10-CM

## 2020-08-28 LAB
BACTERIA BLD CULT: ABNORMAL
GRAM STN SPEC: ABNORMAL
L PNEUMO1 AG UR QL IA.RAPID: NEGATIVE
S PNEUM AG UR QL: NEGATIVE

## 2020-08-28 NOTE — UTILIZATION REVIEW
Notification of Discharge  This is a Notification of Discharge from our facility [address]  Please be advised that this patient has been discharge from our facility  Below you will find the admission and discharge date and time including the patients disposition  PRESENTATION DATE: 8/25/2020 12:38 AM  OBS ADMISSION DATE:   IP ADMISSION DATE: 8/25/20 0140   DISCHARGE DATE: 8/27/2020  1:21 PM  DISPOSITION: Left against medical advice or discontinued care Left against medical advice or discontinued care   Admission Orders listed below:  Admission Orders (From admission, onward)     Ordered        08/25/20 0140  Inpatient Admission  Once                   Please contact the UR Department if additional information is required to close this patient's authorization/case  [address] [facility] Utilization Review Department  Main: [phone] x carefully listen to the prompts  All voicemails are confidential   [email or web address]  Send all requests for admission clinical reviews, approved or denied determinations and any other requests to dedicated fax number below belonging to the campus where the patient is receiving treatment   List of dedicated fax numbers:  [address] DENIALS (Administrative/Medical Necessity) [phone]   [address] (Maternity/NICU/Pediatrics) [phone]   Eugenio Doan [phone]   Susana Tinsley [phone]   [facility] [place] [phone]   Sam Lund [facility] [address] [facility] [phone]   [facility]  [phone]   [address] [facility] [address]  [address] [address] [address] [facility] [phone]

## 2020-08-28 NOTE — TELEPHONE ENCOUNTER
----- Message from Zac Lopez RN sent at 8/27/2020 10:23 AM EDT -----  Regarding: TCM  Patient is signing out AMA  from their inpatient hospitalization today  Patient's unplanned readmission score is red or yellow (meaning that they are at high risk of readmission) and requires a TCM appointment within 3-5 days of discharge  Please contact this patient to schedule appointment      Thank you    Inpatient Care Management

## 2020-08-28 NOTE — TELEPHONE ENCOUNTER
I called to schedule appt w/in 3-5 days per recommendation  Spoke to [employer]  He is unable to schedule because he needs to be with his wife when he does as she is his transportation  He will call back to schedule  I explained Dr Garry Mckenzie has few openings, but can currently get him in Monday afternoon  Please call back as soon as possible

## 2020-08-30 LAB — BACTERIA BLD CULT: NORMAL

## 2020-08-31 ENCOUNTER — PATIENT OUTREACH (OUTPATIENT)
Dept: FAMILY MEDICINE CLINIC | Facility: CLINIC | Age: 50
End: 2020-08-31

## 2020-08-31 DIAGNOSIS — I25.10 CORONARY ARTERY DISEASE INVOLVING NATIVE CORONARY ARTERY OF NATIVE HEART WITHOUT ANGINA PECTORIS: Primary | ICD-10-CM

## 2020-08-31 RX ORDER — ISOSORBIDE MONONITRATE 30 MG/1
30 TABLET, EXTENDED RELEASE ORAL DAILY
Qty: 30 TABLET | Refills: 1 | Status: SHIPPED | OUTPATIENT
Start: 2020-08-31 | End: 2020-09-01

## 2020-08-31 NOTE — PROGRESS NOTES
Outpatient Care Management Note:  In basket message received for HRR referral   Chart review completed

## 2020-09-01 DIAGNOSIS — I25.10 CORONARY ARTERY DISEASE INVOLVING NATIVE CORONARY ARTERY OF NATIVE HEART WITHOUT ANGINA PECTORIS: ICD-10-CM

## 2020-09-01 LAB
BACTERIA BLD CULT: NORMAL
BACTERIA BLD CULT: NORMAL

## 2020-09-01 RX ORDER — ISOSORBIDE MONONITRATE 30 MG/1
30 TABLET, EXTENDED RELEASE ORAL DAILY
Qty: 90 TABLET | Refills: 3 | Status: SHIPPED | OUTPATIENT
Start: 2020-09-01 | End: 2022-02-10 | Stop reason: SDUPTHER

## 2020-09-02 ENCOUNTER — PATIENT OUTREACH (OUTPATIENT)
Dept: FAMILY MEDICINE CLINIC | Facility: CLINIC | Age: 50
End: 2020-09-02

## 2020-09-02 NOTE — PROGRESS NOTES
Outpatient Care Management Note:  Outreach call placed to [facility]  Introduced myself and my role in his care  [facility] is not interested in outreach at this time  He is doing "great" and returned to work the day he left the hospital   He is going to dialysis on Tuesday, Thursday and Saturday  He has his TCM appointment scheduled  Provided my contact information should he have any future needs

## 2020-09-04 ENCOUNTER — OFFICE VISIT (OUTPATIENT)
Dept: FAMILY MEDICINE CLINIC | Facility: CLINIC | Age: 50
End: 2020-09-04
Payer: COMMERCIAL

## 2020-09-04 ENCOUNTER — HOSPITAL ENCOUNTER (OUTPATIENT)
Dept: RADIOLOGY | Facility: HOSPITAL | Age: 50
Discharge: HOME | End: 2020-09-04
Attending: FAMILY MEDICINE
Payer: COMMERCIAL

## 2020-09-04 VITALS
WEIGHT: 112 LBS | BODY MASS INDEX: 19.12 KG/M2 | TEMPERATURE: 97.5 F | DIASTOLIC BLOOD PRESSURE: 62 MMHG | HEIGHT: 64 IN | SYSTOLIC BLOOD PRESSURE: 142 MMHG

## 2020-09-04 DIAGNOSIS — J18.9 PNEUMONIA OF RIGHT LOWER LOBE DUE TO INFECTIOUS ORGANISM: ICD-10-CM

## 2020-09-04 DIAGNOSIS — Z76.89 ENCOUNTER FOR SUPPORT AND COORDINATION OF TRANSITION OF CARE: Primary | ICD-10-CM

## 2020-09-04 PROCEDURE — 71046 X-RAY EXAM CHEST 2 VIEWS: CPT

## 2020-09-04 PROCEDURE — 99496 TRANSJ CARE MGMT HIGH F2F 7D: CPT | Performed by: FAMILY MEDICINE

## 2020-09-04 NOTE — PROGRESS NOTES
Assessment/Plan:      Diagnoses and all orders for this visit:    Encounter for support and coordination of transition of care    Pneumonia of right lower lobe due to infectious organism  -     XR chest pa & lateral; Future         Subjective:     Patient ID: Bryan Chapman is a 47 y o  male  Transition of care on Mr Katerina Young all was hospitalized with pneumonia and flash pulmonary edema I have reviewed his entire hospital record his cultures are negative at 5 days but there was a question of whether he had micrococcus he was treated with Zosyn and vancomycin while in the hospital and was discharged on doxycycline he was a bad boy in signed out against medical advice      Review of Systems   Constitutional: Negative for activity change, appetite change, diaphoresis, fatigue and fever  HENT: Positive for dental problem  Eyes: Positive for visual disturbance  Respiratory: Positive for cough  Negative for apnea, chest tightness, shortness of breath and wheezing  Cardiovascular: Negative for chest pain, palpitations and leg swelling  Gastrointestinal: Negative for abdominal distention, abdominal pain, anal bleeding, constipation, diarrhea, nausea and vomiting  Endocrine: Negative for cold intolerance, heat intolerance, polydipsia, polyphagia and polyuria  Genitourinary: Negative for difficulty urinating, dysuria, flank pain, hematuria and urgency  Musculoskeletal: Negative for arthralgias, back pain, gait problem, joint swelling and myalgias  Skin: Negative for color change, rash and wound  Allergic/Immunologic: Negative for environmental allergies, food allergies and immunocompromised state  Neurological: Negative for dizziness, seizures, syncope, speech difficulty, numbness and headaches  Hematological: Negative for adenopathy  Does not bruise/bleed easily  Psychiatric/Behavioral: Negative for agitation, behavioral problems, hallucinations, sleep disturbance and suicidal ideas  Objective:     Physical Exam  Constitutional:       General: He is not in acute distress  Appearance: He is well-developed  He is not diaphoretic  HENT:      Head: Normocephalic  Right Ear: External ear normal       Left Ear: External ear normal       Nose: Nose normal    Eyes:      General: No scleral icterus  Right eye: No discharge  Left eye: No discharge  Conjunctiva/sclera: Conjunctivae normal       Pupils: Pupils are equal, round, and reactive to light  Neck:      Musculoskeletal: Normal range of motion  Thyroid: No thyromegaly  Trachea: No tracheal deviation  Cardiovascular:      Rate and Rhythm: Normal rate and regular rhythm  Heart sounds: Normal heart sounds  No murmur  No friction rub  No gallop  Pulmonary:      Effort: Pulmonary effort is normal  No respiratory distress  Breath sounds: Normal breath sounds  No wheezing  Abdominal:      General: Bowel sounds are normal       Palpations: Abdomen is soft  There is no mass  Tenderness: There is no abdominal tenderness  There is no guarding  Musculoskeletal:         General: No deformity  Lymphadenopathy:      Cervical: No cervical adenopathy  Skin:     General: Skin is warm and dry  Findings: No erythema or rash  Neurological:      Mental Status: He is alert and oriented to person, place, and time  Cranial Nerves: No cranial nerve deficit  Psychiatric:         Thought Content:  Thought content normal            Vitals:    09/04/20 0734   BP: 142/62   BP Location: Left arm   Patient Position: Sitting   Cuff Size: Standard   Temp: 97 5 °F (36 4 °C)   TempSrc: Temporal   Weight: 50 8 kg (112 lb)   Height: 5' 4" (1 626 m)       The following portions of the patient's history were reviewed and updated as appropriate:   He  has a past medical history of Bacteremia (12/21/2018), CAD (coronary artery disease), Cardiac arrest ([address] ), Chronic kidney disease, Diabetes mellitus type 1 [facility]), Dialysis patient [facility]), GI bleed, Hyperlipidemia, Hypertension, Infection at site of external fixator pin [facility]), MI (myocardial infarction) ([facility] ), Pneumonia, Renal failure, and Renal transplant, status post (07/21/2007)    He   Patient Active Problem List    Diagnosis Date Noted    Community acquired pneumonia of right lower lobe of lung 08/26/2020    Respiratory distress 08/25/2020    Lesion of pancreas 06/22/2020    Abnormal CT scan, colon 06/22/2020    Chronic kidney disease, unspecified 01/15/2020    Coronary artery disease involving native coronary artery of native heart without angina pectoris 12/23/2019    Pre-operative cardiovascular examination 12/23/2019    ESRD (end stage renal disease) on dialysis ([facility] ) 12/03/2019    Hypertensive urgency 12/01/2019    Hx of right BKA ([facility] ) 10/21/2019    Acute pulmonary edema ([facility] ) 10/20/2019    Chronic anemia 10/14/2019    Pulmonary hypertension ([facility] ) 10/13/2019    Acute blood loss anemia 09/24/2019    Acute respiratory failure with hypoxia ([facility] ) 09/24/2019    S/P AKA (above knee amputation), right ([facility] ) 04/16/2019    Depressed left ventricular ejection fraction 02/06/2019    Bacteremia 12/21/2018    Acute renal failure (ARF) ([facility] ) 12/20/2018    Acute kidney injury superimposed on CKD ([facility] ) 68/53/2388    Metabolic acidosis 05/00/4436    Hyperphosphatemia 12/20/2018    Gastrointestinal hemorrhage 12/20/2018    Severe sepsis ([facility] ) 12/19/2018    Urinary retention 09/24/2017    Chronic kidney disease, stage IV (severe) ([facility] ) 09/23/2017    Chronic osteomyelitis of tibia ([facility] ) 09/23/2017    DKA (diabetic ketoacidoses) ([facility] ) 09/23/2017    Elevated troponin 09/23/2017    Diarrhea 09/23/2017    Cellulitis of ankle 09/23/2017    Non-ST elevation myocardial infarction (NSTEMI), type 2 09/23/2017    Closed fracture of distal end of right tibia with routine healing 07/03/2017    Osteomyelitis ([facility] ) 12/07/2016    Poor circulation 12/07/2016    Sepsis ([place] [address] ) 10/27/2016    Acute kidney injury ([place] [address] ) 10/27/2016    Osteomyelitis ([place] [address] ) 10/26/2016    Foot pain 10/26/2016    Type 1 diabetes mellitus ([place] [address] ) 10/26/2016    Renal transplant, status post 10/26/2016    Immunosuppression ([place] [address] ) 11/04/2014    Hypertension 08/04/2010     He  has a past surgical history that includes Nephrectomy transplanted organ; GLUTAMIC ACID DECARBOXYLASE (HISTORICAL); Eye surgery; Toe amputation (Right, 10/27/2016); pr open treatment fracture distal tibia fibula (Right, 7/3/2017); CLOSED REDUCTION ANKLE (Right, 7/3/2017); Ankle fracture surgery; Ankle hardware removal (Right, 7/31/2017); Ankle hardware removal (Right, 8/17/2017); Fracture surgery; IR PICC line (8/20/2018); IR venous line removal (10/5/2018); Esophagogastroduodenoscopy (N/A, 12/20/2018); Leg amputation (Right, 02/01/2019); IR tunneled dialysis catheter placement (10/21/2019); Coronary angioplasty with stent (02/2018); IR tunneled dialysis catheter check/change/reposition/angioplasty (1/8/2020); Amputation (Right, 02/2019); pr anastomosis,av,any site (Right, 2/11/2020); IR AV fistulagram/graftogram (4/16/2020); and IR tunneled dialysis catheter removal (5/29/2020)  His family history includes Coronary artery disease in his mother; Diabetes in his brother; No Known Problems in his father  He  reports that he has never smoked  He has never used smokeless tobacco  He reports previous alcohol use  He reports that he does not use drugs    Current Outpatient Medications   Medication Sig Dispense Refill    albuterol (2 5 mg/3 mL) 0 083 % nebulizer solution Inhale 2 5 mg every 4 (four) hours as needed       aspirin 81 mg chewable tablet Chew 81 mg every morning       atorvastatin (LIPITOR) 80 mg tablet Take 80 mg by mouth every morning       azaTHIOprine (IMURAN) 50 mg tablet Take 1 tablet (50 mg total) by mouth every morning 90 tablet 2    calcium carbonate (TUMS) 500 mg chewable tablet Chew 500 mg 2 (two) times a day       carvedilol (COREG) 6 25 mg tablet Take 1 tablet (6 25 mg total) by mouth 2 (two) times a day with meals 180 tablet 2    Cholecalciferol 25 MCG (1000 UT) tablet Take 1 tablet (1,000 Units total) by mouth daily (Patient taking differently: Take 1,000 Units by mouth every morning ) 90 tablet 1    glucagon (GLUCAGON EMERGENCY) 1 MG injection Inject 1 mg under the skin once as needed for low blood sugar for up to 1 dose 3 kit 5    insulin detemir (LEVEMIR) 100 units/mL subcutaneous injection Inject 8 Units under the skin 2 (two) times a day       insulin lispro (HumaLOG) 100 units/mL injection Inject 10 Units under the skin as needed       isosorbide mononitrate (IMDUR) 30 mg 24 hr tablet Take 1 tablet (30 mg total) by mouth daily 90 tablet 3    Lancets (ONETOUCH DELICA PLUS [id number]) MISC   5    metoprolol succinate (TOPROL-XL) 50 mg 24 hr tablet Take 1 tablet (50 mg total) by mouth every morning 90 tablet 2    NIFEdipine (PROCARDIA XL) 90 mg 24 hr tablet Take 1 tablet (90 mg total) by mouth daily 90 tablet 2    ondansetron (ZOFRAN-ODT) 4 mg disintegrating tablet Take 1 tablet (4 mg total) by mouth every 6 (six) hours as needed for vomiting 20 tablet 0    ONE TOUCH ULTRA TEST test strip CHECK BLOOD SUGAR 5 TO 6 TIMES DAILY 600 each 5    pantoprazole (PROTONIX) 40 mg tablet Take 1 tablet (40 mg total) by mouth daily 90 tablet 2    predniSONE 5 mg tablet Take 1 tablet (5 mg total) by mouth daily 90 tablet 1    tacrolimus (PROGRAF) 1 mg capsule Take 1 mg by mouth 3 (three) times a day 0800, 1400, 2000       No current facility-administered medications for this visit        Current Outpatient Medications on File Prior to Visit   Medication Sig    albuterol (2 5 mg/3 mL) 0 083 % nebulizer solution Inhale 2 5 mg every 4 (four) hours as needed     aspirin 81 mg chewable tablet Chew 81 mg every morning     atorvastatin (LIPITOR) 80 mg tablet Take 80 mg by mouth every morning     azaTHIOprine (IMURAN) 50 mg tablet Take 1 tablet (50 mg total) by mouth every morning    calcium carbonate (TUMS) 500 mg chewable tablet Chew 500 mg 2 (two) times a day     carvedilol (COREG) 6 25 mg tablet Take 1 tablet (6 25 mg total) by mouth 2 (two) times a day with meals    Cholecalciferol 25 MCG (1000 UT) tablet Take 1 tablet (1,000 Units total) by mouth daily (Patient taking differently: Take 1,000 Units by mouth every morning )    glucagon (GLUCAGON EMERGENCY) 1 MG injection Inject 1 mg under the skin once as needed for low blood sugar for up to 1 dose    insulin detemir (LEVEMIR) 100 units/mL subcutaneous injection Inject 8 Units under the skin 2 (two) times a day     insulin lispro (HumaLOG) 100 units/mL injection Inject 10 Units under the skin as needed     isosorbide mononitrate (IMDUR) 30 mg 24 hr tablet Take 1 tablet (30 mg total) by mouth daily    Lancets (ONETOUCH DELICA PLUS [id number]) MISC     metoprolol succinate (TOPROL-XL) 50 mg 24 hr tablet Take 1 tablet (50 mg total) by mouth every morning    NIFEdipine (PROCARDIA XL) 90 mg 24 hr tablet Take 1 tablet (90 mg total) by mouth daily    ondansetron (ZOFRAN-ODT) 4 mg disintegrating tablet Take 1 tablet (4 mg total) by mouth every 6 (six) hours as needed for vomiting    ONE TOUCH ULTRA TEST test strip CHECK BLOOD SUGAR 5 TO 6 TIMES DAILY    pantoprazole (PROTONIX) 40 mg tablet Take 1 tablet (40 mg total) by mouth daily    predniSONE 5 mg tablet Take 1 tablet (5 mg total) by mouth daily    tacrolimus (PROGRAF) 1 mg capsule Take 1 mg by mouth 3 (three) times a day 0800, 1400, 2000     No current facility-administered medications on file prior to visit  He has No Known Allergies       Transitional Care Management Review:  Delmar Ross is a 47 y o  male here for TCM follow up       During the TCM phone call patient stated:    TCM Call (since 8/4/2020)     Date and time call was made  8/28/2020  8:36 AM    Hospital care reviewed  Records reviewed Patient was hospitialized at  [address] [facility]    Date of Admission  08/25/20    Date of discharge  08/27/20    Diagnosis  Respiratory distress    Disposition  Home    Current Symptoms  None      TCM Call (since 8/4/2020)     Post hospital issues  None    Should patient be enrolled in anticoag monitoring? No    Scheduled for follow up?   Yes (Comment)  9/4/20 @ 7:30 AM    Patients specialists  Nephrologist    Nephrologist name  DR Abram Puentes    Did you obtain your prescribed medications  Yes    Do you need help managing your prescriptions or medications  No    Is transportation to your appointment needed  No    I have advised the patient to call PCP with any new or worsening symptoms  ALEKSEY Amaro or Significiant other    Support System  Spouse    The type of support provided  Emotional; Physical    Do you have social support  Yes, as much as I need    Are you recieving any outpatient services  No    Are you recieving home care services  No    Are you using any community resources  No    Current waiver services  No    Have you fallen in the last 12 months  No    Interperter language line needed  No    Counseling  [address] [facility], DO

## 2020-09-08 RX ORDER — TACROLIMUS 1 MG/1
1 CAPSULE ORAL 3 TIMES DAILY
Qty: 270 CAPSULE | Refills: 2 | OUTPATIENT
Start: 2020-09-08

## 2020-09-08 NOTE — TELEPHONE ENCOUNTER
This prescription should be prescribed by his transplant physicians because it requires special follow-up

## 2020-09-10 DIAGNOSIS — T86.12 RENAL TRANSPLANT FAILURE AND REJECTION: ICD-10-CM

## 2020-09-10 DIAGNOSIS — E10.9 DIABETES MELLITUS TYPE 1 (HCC): Primary | ICD-10-CM

## 2020-09-10 DIAGNOSIS — T86.11 RENAL TRANSPLANT FAILURE AND REJECTION: ICD-10-CM

## 2020-09-10 RX ORDER — TACROLIMUS 1 MG/1
1 CAPSULE ORAL 3 TIMES DAILY
Qty: 270 CAPSULE | Refills: 1 | Status: SHIPPED | OUTPATIENT
Start: 2020-09-10 | End: 2021-02-10 | Stop reason: HOSPADM

## 2020-09-16 ENCOUNTER — APPOINTMENT (EMERGENCY)
Dept: RADIOLOGY | Facility: HOSPITAL | Age: 50
DRG: 189 | End: 2020-09-16
Payer: COMMERCIAL

## 2020-09-16 ENCOUNTER — HOSPITAL ENCOUNTER (INPATIENT)
Facility: HOSPITAL | Age: 50
LOS: 2 days | Discharge: HOME | DRG: 189 | End: 2020-09-18
Attending: EMERGENCY MEDICINE | Admitting: FAMILY MEDICINE
Payer: COMMERCIAL

## 2020-09-16 DIAGNOSIS — Z99.2 ESRD (END STAGE RENAL DISEASE) ON DIALYSIS (HCC): ICD-10-CM

## 2020-09-16 DIAGNOSIS — N18.6 ESRD (END STAGE RENAL DISEASE) ON DIALYSIS (HCC): ICD-10-CM

## 2020-09-16 DIAGNOSIS — R09.02 HYPOXIA: ICD-10-CM

## 2020-09-16 DIAGNOSIS — J18.9 PNEUMONIA: ICD-10-CM

## 2020-09-16 DIAGNOSIS — E16.0 HYPOGLYCEMIA DUE TO INSULIN: Primary | ICD-10-CM

## 2020-09-16 DIAGNOSIS — T38.3X5A HYPOGLYCEMIA DUE TO INSULIN: Primary | ICD-10-CM

## 2020-09-16 PROBLEM — J96.21 ACUTE ON CHRONIC RESPIRATORY FAILURE WITH HYPOXIA (HCC): Status: ACTIVE | Noted: 2020-09-16

## 2020-09-16 LAB
ALBUMIN SERPL BCP-MCNC: 3.5 G/DL (ref 3.5–5)
ALP SERPL-CCNC: 167 U/L (ref 46–116)
ALT SERPL W P-5'-P-CCNC: 19 U/L (ref 12–78)
ANION GAP SERPL CALCULATED.3IONS-SCNC: 12 MMOL/L (ref 4–13)
AST SERPL W P-5'-P-CCNC: 17 U/L (ref 5–45)
BASOPHILS # BLD AUTO: 0.06 THOUSANDS/ΜL (ref 0–0.1)
BASOPHILS NFR BLD AUTO: 1 % (ref 0–1)
BILIRUB SERPL-MCNC: 0.8 MG/DL (ref 0.2–1)
BUN SERPL-MCNC: 38 MG/DL (ref 5–25)
CALCIUM SERPL-MCNC: 8.5 MG/DL (ref 8.3–10.1)
CHLORIDE SERPL-SCNC: 93 MMOL/L (ref 100–108)
CO2 SERPL-SCNC: 25 MMOL/L (ref 21–32)
CREAT SERPL-MCNC: 5.12 MG/DL (ref 0.6–1.3)
EOSINOPHIL # BLD AUTO: 0.21 THOUSAND/ΜL (ref 0–0.61)
EOSINOPHIL NFR BLD AUTO: 3 % (ref 0–6)
ERYTHROCYTE [DISTWIDTH] IN BLOOD BY AUTOMATED COUNT: 13.8 % (ref 11.6–15.1)
GFR SERPL CREATININE-BSD FRML MDRD: 12 ML/MIN/1.73SQ M
GLUCOSE SERPL-MCNC: 189 MG/DL (ref 65–140)
GLUCOSE SERPL-MCNC: 199 MG/DL (ref 65–140)
GLUCOSE SERPL-MCNC: 61 MG/DL (ref 65–140)
GLUCOSE SERPL-MCNC: 66 MG/DL (ref 65–140)
HCT VFR BLD AUTO: 32.7 % (ref 36.5–49.3)
HGB BLD-MCNC: 10.7 G/DL (ref 12–17)
IMM GRANULOCYTES # BLD AUTO: 0.02 THOUSAND/UL (ref 0–0.2)
IMM GRANULOCYTES NFR BLD AUTO: 0 % (ref 0–2)
LACTATE SERPL-SCNC: 0.9 MMOL/L (ref 0.5–2)
LIPASE SERPL-CCNC: 48 U/L (ref 73–393)
LYMPHOCYTES # BLD AUTO: 1.76 THOUSANDS/ΜL (ref 0.6–4.47)
LYMPHOCYTES NFR BLD AUTO: 24 % (ref 14–44)
MCH RBC QN AUTO: 31 PG (ref 26.8–34.3)
MCHC RBC AUTO-ENTMCNC: 32.7 G/DL (ref 31.4–37.4)
MCV RBC AUTO: 95 FL (ref 82–98)
MONOCYTES # BLD AUTO: 0.64 THOUSAND/ΜL (ref 0.17–1.22)
MONOCYTES NFR BLD AUTO: 9 % (ref 4–12)
NEUTROPHILS # BLD AUTO: 4.78 THOUSANDS/ΜL (ref 1.85–7.62)
NEUTS SEG NFR BLD AUTO: 63 % (ref 43–75)
NRBC BLD AUTO-RTO: 0 /100 WBCS
PLATELET # BLD AUTO: 253 THOUSANDS/UL (ref 149–390)
PMV BLD AUTO: 8.4 FL (ref 8.9–12.7)
POTASSIUM SERPL-SCNC: 4.2 MMOL/L (ref 3.5–5.3)
PROT SERPL-MCNC: 7.8 G/DL (ref 6.4–8.2)
RBC # BLD AUTO: 3.45 MILLION/UL (ref 3.88–5.62)
SARS-COV-2 RNA RESP QL NAA+PROBE: NEGATIVE
SODIUM SERPL-SCNC: 130 MMOL/L (ref 136–145)
TROPONIN I SERPL-MCNC: <0.02 NG/ML
TSH SERPL DL<=0.05 MIU/L-ACNC: 1.49 UIU/ML (ref 0.36–3.74)
WBC # BLD AUTO: 7.47 THOUSAND/UL (ref 4.31–10.16)

## 2020-09-16 PROCEDURE — 82948 REAGENT STRIP/BLOOD GLUCOSE: CPT

## 2020-09-16 PROCEDURE — 94760 N-INVAS EAR/PLS OXIMETRY 1: CPT

## 2020-09-16 PROCEDURE — 87635 SARS-COV-2 COVID-19 AMP PRB: CPT | Performed by: EMERGENCY MEDICINE

## 2020-09-16 PROCEDURE — 80053 COMPREHEN METABOLIC PANEL: CPT | Performed by: EMERGENCY MEDICINE

## 2020-09-16 PROCEDURE — 87081 CULTURE SCREEN ONLY: CPT | Performed by: NURSE PRACTITIONER

## 2020-09-16 PROCEDURE — 84484 ASSAY OF TROPONIN QUANT: CPT | Performed by: EMERGENCY MEDICINE

## 2020-09-16 PROCEDURE — 83690 ASSAY OF LIPASE: CPT | Performed by: EMERGENCY MEDICINE

## 2020-09-16 PROCEDURE — 87631 RESP VIRUS 3-5 TARGETS: CPT | Performed by: NURSE PRACTITIONER

## 2020-09-16 PROCEDURE — 87040 BLOOD CULTURE FOR BACTERIA: CPT | Performed by: EMERGENCY MEDICINE

## 2020-09-16 PROCEDURE — 84145 PROCALCITONIN (PCT): CPT | Performed by: EMERGENCY MEDICINE

## 2020-09-16 PROCEDURE — 85025 COMPLETE CBC W/AUTO DIFF WBC: CPT | Performed by: EMERGENCY MEDICINE

## 2020-09-16 PROCEDURE — 36415 COLL VENOUS BLD VENIPUNCTURE: CPT | Performed by: EMERGENCY MEDICINE

## 2020-09-16 PROCEDURE — 99285 EMERGENCY DEPT VISIT HI MDM: CPT

## 2020-09-16 PROCEDURE — 96375 TX/PRO/DX INJ NEW DRUG ADDON: CPT

## 2020-09-16 PROCEDURE — 84443 ASSAY THYROID STIM HORMONE: CPT | Performed by: EMERGENCY MEDICINE

## 2020-09-16 PROCEDURE — 83605 ASSAY OF LACTIC ACID: CPT | Performed by: EMERGENCY MEDICINE

## 2020-09-16 PROCEDURE — 93005 ELECTROCARDIOGRAM TRACING: CPT

## 2020-09-16 PROCEDURE — 99285 EMERGENCY DEPT VISIT HI MDM: CPT | Performed by: EMERGENCY MEDICINE

## 2020-09-16 PROCEDURE — 96365 THER/PROPH/DIAG IV INF INIT: CPT

## 2020-09-16 PROCEDURE — 99223 1ST HOSP IP/OBS HIGH 75: CPT | Performed by: NURSE PRACTITIONER

## 2020-09-16 PROCEDURE — 71045 X-RAY EXAM CHEST 1 VIEW: CPT

## 2020-09-16 RX ORDER — HEPARIN SODIUM 5000 [USP'U]/ML
5000 INJECTION, SOLUTION INTRAVENOUS; SUBCUTANEOUS EVERY 8 HOURS SCHEDULED
Status: DISCONTINUED | OUTPATIENT
Start: 2020-09-16 | End: 2020-09-18 | Stop reason: HOSPADM

## 2020-09-16 RX ORDER — CEFEPIME HYDROCHLORIDE 1 G/50ML
1000 INJECTION, SOLUTION INTRAVENOUS EVERY 24 HOURS
Status: DISCONTINUED | OUTPATIENT
Start: 2020-09-17 | End: 2020-09-18

## 2020-09-16 RX ORDER — NIFEDIPINE 30 MG/1
90 TABLET, EXTENDED RELEASE ORAL DAILY
Status: DISCONTINUED | OUTPATIENT
Start: 2020-09-17 | End: 2020-09-18 | Stop reason: HOSPADM

## 2020-09-16 RX ORDER — TACROLIMUS 1 MG/1
1 CAPSULE ORAL 3 TIMES DAILY
Status: DISCONTINUED | OUTPATIENT
Start: 2020-09-16 | End: 2020-09-18 | Stop reason: HOSPADM

## 2020-09-16 RX ORDER — CEFEPIME HYDROCHLORIDE 1 G/50ML
1000 INJECTION, SOLUTION INTRAVENOUS ONCE
Status: COMPLETED | OUTPATIENT
Start: 2020-09-16 | End: 2020-09-16

## 2020-09-16 RX ORDER — CEFEPIME HYDROCHLORIDE 1 G/50ML
1000 INJECTION, SOLUTION INTRAVENOUS EVERY 8 HOURS
Status: DISCONTINUED | OUTPATIENT
Start: 2020-09-17 | End: 2020-09-16

## 2020-09-16 RX ORDER — ASPIRIN 81 MG/1
81 TABLET, CHEWABLE ORAL EVERY MORNING
Status: DISCONTINUED | OUTPATIENT
Start: 2020-09-17 | End: 2020-09-18 | Stop reason: HOSPADM

## 2020-09-16 RX ORDER — ATORVASTATIN CALCIUM 40 MG/1
80 TABLET, FILM COATED ORAL EVERY MORNING
Status: DISCONTINUED | OUTPATIENT
Start: 2020-09-17 | End: 2020-09-18 | Stop reason: HOSPADM

## 2020-09-16 RX ORDER — CARVEDILOL 3.12 MG/1
6.25 TABLET ORAL 2 TIMES DAILY WITH MEALS
Status: DISCONTINUED | OUTPATIENT
Start: 2020-09-17 | End: 2020-09-18 | Stop reason: HOSPADM

## 2020-09-16 RX ORDER — DEXTROSE MONOHYDRATE 25 G/50ML
25 INJECTION, SOLUTION INTRAVENOUS EVERY 2 HOUR PRN
Status: DISCONTINUED | OUTPATIENT
Start: 2020-09-16 | End: 2020-09-18 | Stop reason: HOSPADM

## 2020-09-16 RX ORDER — ACETAMINOPHEN 325 MG/1
650 TABLET ORAL EVERY 6 HOURS PRN
Status: DISCONTINUED | OUTPATIENT
Start: 2020-09-16 | End: 2020-09-18 | Stop reason: HOSPADM

## 2020-09-16 RX ORDER — GUAIFENESIN 600 MG
600 TABLET, EXTENDED RELEASE 12 HR ORAL 2 TIMES DAILY
Status: DISCONTINUED | OUTPATIENT
Start: 2020-09-17 | End: 2020-09-18 | Stop reason: HOSPADM

## 2020-09-16 RX ORDER — METOPROLOL SUCCINATE 50 MG/1
50 TABLET, EXTENDED RELEASE ORAL EVERY MORNING
Status: DISCONTINUED | OUTPATIENT
Start: 2020-09-17 | End: 2020-09-18 | Stop reason: HOSPADM

## 2020-09-16 RX ORDER — AZATHIOPRINE 50 MG/1
50 TABLET ORAL EVERY MORNING
Status: DISCONTINUED | OUTPATIENT
Start: 2020-09-17 | End: 2020-09-18 | Stop reason: HOSPADM

## 2020-09-16 RX ORDER — ALBUTEROL SULFATE 2.5 MG/3ML
2.5 SOLUTION RESPIRATORY (INHALATION) EVERY 4 HOURS PRN
Status: DISCONTINUED | OUTPATIENT
Start: 2020-09-16 | End: 2020-09-18 | Stop reason: HOSPADM

## 2020-09-16 RX ORDER — PREDNISONE 1 MG/1
5 TABLET ORAL DAILY
Status: DISCONTINUED | OUTPATIENT
Start: 2020-09-17 | End: 2020-09-18 | Stop reason: HOSPADM

## 2020-09-16 RX ORDER — DEXTROSE MONOHYDRATE 25 G/50ML
50 INJECTION, SOLUTION INTRAVENOUS ONCE
Status: COMPLETED | OUTPATIENT
Start: 2020-09-16 | End: 2020-09-16

## 2020-09-16 RX ORDER — PANTOPRAZOLE SODIUM 40 MG/1
40 TABLET, DELAYED RELEASE ORAL
Status: DISCONTINUED | OUTPATIENT
Start: 2020-09-17 | End: 2020-09-18 | Stop reason: HOSPADM

## 2020-09-16 RX ORDER — ISOSORBIDE MONONITRATE 30 MG/1
30 TABLET, EXTENDED RELEASE ORAL DAILY
Status: DISCONTINUED | OUTPATIENT
Start: 2020-09-17 | End: 2020-09-18 | Stop reason: HOSPADM

## 2020-09-16 RX ADMIN — DEXTROSE MONOHYDRATE 50 ML: 25 INJECTION, SOLUTION INTRAVENOUS at 20:30

## 2020-09-16 RX ADMIN — VANCOMYCIN HYDROCHLORIDE 1000 MG: 1 INJECTION, POWDER, LYOPHILIZED, FOR SOLUTION INTRAVENOUS at 22:20

## 2020-09-16 RX ADMIN — CEFEPIME HYDROCHLORIDE 1000 MG: 1 INJECTION, SOLUTION INTRAVENOUS at 21:43

## 2020-09-16 NOTE — ED PROVIDER NOTES
History  Chief Complaint   Patient presents with    Hypoglycemia - no symptoms     Pt states he has been having trouble keeping his blood sugar up all day  Last reading at home 71     62-year-old male male type 1 diabetic with history of renal transplant; he is on hemodialysis Tuesday Thursday Saturday with Gregory Estes/Alie; he presents for recurrent hypoglycemia blood sugars have been as low as 40s ; his last insulin was at approximately 1000 hours today he takes levemir 10 units twice a day as well as sliding scale insulin  Patient has been eating skin skittles drinking Coca-Cola sugared soda and had some PEEPS; he is now feels full and presents to ED at encourgement of his wife  Patient was recently admitted on from 8/25-8/27 with acute pulmonary edema and pneumonia and respiratory failure he was placed on BiPAP underwent dialysis and did have a blood culture which was showing Gram-positive in clusters possibly Micrococcus was initially initiated on IV Zosyn transition to Vanco and cefepime he declined repeat blood cultures and ultimately signed out against medical advice he did complete some outpatient doxycycline repeat chest x-ray on 9/4/2020 was improved; blood cultures from that admit resulted no growth *5 days  Patient denies any fever or chills he has had no cough or upper respiratory complaints  He denies any skin issues he has had no nausea vomiting or diarrhea no abdominal pain no chest pain no shortness of breath no sick contacts dialysis sessions have been going well he has a right upper extremity AV fistula  He has had prior episodes of hypoglycemia where the cause is not been determined turned the corner with amps of D50 and then started readmit stream his insulin this occurred approximately year ago patient has had no lower extremity edema no significant changes to his medications  Prior to Admission Medications   Prescriptions Last Dose Informant Patient Reported? Taking? Cholecalciferol 25 MCG (1000 UT) tablet Not Taking at Unknown time Self No No   Sig: Take 1 tablet (1,000 Units total) by mouth daily   Patient not taking: Reported on 9/16/2020   Lancets ([address]) [address] [facility] 9/16/2020 at Unknown time Self Yes Yes   NIFEdipine (PROCARDIA XL) 90 mg 24 hr tablet 9/16/2020 at Unknown time  No Yes   Sig: Take 1 tablet (90 mg total) by mouth daily   ONE TOUCH ULTRA TEST test strip 9/16/2020 at Unknown time Self No Yes   Sig: CHECK BLOOD SUGAR 5 TO 6 TIMES DAILY   albuterol (2 5 mg/3 mL) 0 083 % nebulizer solution More than a month at Unknown time Self Yes No   Sig: Inhale 2 5 mg every 4 (four) hours as needed    aspirin 81 mg chewable tablet 9/16/2020 at Unknown time Self Yes Yes   Sig: Chew 81 mg every morning    atorvastatin (LIPITOR) 80 mg tablet 9/16/2020 at Unknown time Self Yes Yes   Sig: Take 80 mg by mouth every morning    azaTHIOprine (IMURAN) 50 mg tablet 9/16/2020 at Unknown time  No Yes   Sig: Take 1 tablet (50 mg total) by mouth every morning   calcium carbonate (TUMS) 500 mg chewable tablet Past Week at Unknown time Self Yes Yes   Sig: Chew 500 mg 2 (two) times a day    carvedilol (COREG) 6 25 mg tablet 9/16/2020 at Unknown time  No Yes   Sig: Take 1 tablet (6 25 mg total) by mouth 2 (two) times a day with meals   glucagon (GLUCAGON EMERGENCY) 1 MG injection More than a month at Unknown time Self No No   Sig: Inject 1 mg under the skin once as needed for low blood sugar for up to 1 dose   insulin detemir (LEVEMIR) 100 units/mL subcutaneous injection   No No   Sig: Inject 8 Units under the skin 2 (two) times a day   insulin lispro (HumaLOG) 100 units/mL injection 9/16/2020 at Unknown time  No Yes   Sig: Inject 10 Units under the skin as needed for high blood sugar   isosorbide mononitrate (IMDUR) 30 mg 24 hr tablet 9/16/2020 at Unknown time  No Yes   Sig: Take 1 tablet (30 mg total) by mouth daily   metoprolol succinate (TOPROL-XL) 50 mg 24 hr tablet 9/16/2020 at Unknown time Self No Yes   Sig: Take 1 tablet (50 mg total) by mouth every morning   ondansetron (ZOFRAN-ODT) 4 mg disintegrating tablet More than a month at Unknown time Self No No   Sig: Take 1 tablet (4 mg total) by mouth every 6 (six) hours as needed for vomiting   pantoprazole (PROTONIX) 40 mg tablet 9/16/2020 at Unknown time  No Yes   Sig: Take 1 tablet (40 mg total) by mouth daily   predniSONE 5 mg tablet 9/16/2020 at Unknown time Self No Yes   Sig: Take 1 tablet (5 mg total) by mouth daily   tacrolimus (PROGRAF) 1 mg capsule 9/16/2020 at Unknown time  No Yes   Sig: Take 1 capsule (1 mg total) by mouth 3 (three) times a day 0800, 1400, 2000      Facility-Administered Medications: None       Past Medical History:   Diagnosis Date    Bacteremia 12/21/2018    CAD (coronary artery disease)     s/p MARC to LCx, pLAD 2/2018    [facility]     Chronic kidney disease     Diabetes mellitus type 1 ([facility]     Dialysis patient ([facility] )     Access in right chest    GI bleed     Hyperlipidemia     Hypertension     Infection at site of external fixator pin ([facility]     MI (myocardial infarction) ([facility]     Pneumonia     Last Assessed [id number]    Renal failure     Renal transplant, status post 07/21/2007       Past Surgical History:   Procedure Laterality Date    AMPUTATION Right 02/2019    aboce knee    ANKLE FRACTURE SURGERY      ANKLE HARDWARE REMOVAL Right 7/31/2017    Procedure: REMOVAL HARDWARE ANKLE;  Surgeon: Jamison Krabbe, MD;  Location: [facility];  Service: Orthopedics    ANKLE HARDWARE REMOVAL Right 8/17/2017    Procedure: TIBIA FAILED HARDWARE REMOVAL;  Surgeon: Jamison Krabbe, MD;  [facility]: [facility];  Service: Orthopedics    CLOSED REDUCTION ANKLE Right 7/3/2017    Procedure: CLOSED REDUCTION DISTAL TIB-FIB AND CASTING VS;  Surgeon: Jamison Krabbe, MD;  Location: [facility];  Service: Orthopedics    CORONARY ANGIOPLASTY WITH STENT PLACEMENT  02/2018    CAD s/p MARC to LCx, pLAD  ESOPHAGOGASTRODUODENOSCOPY N/A 12/20/2018    Procedure: ESOPHAGOGASTRODUODENOSCOPY (EGD) in ICU; Surgeon: Regine Rios MD;  Location: [facility]; Service: Gastroenterology    EYE SURGERY      FRACTURE SURGERY      ORIF Rt Ankle    GLUTAMIC ACID DECARBOXYLASE (HISTORICAL)      IR AV FISTULAGRAM/GRAFTOGRAM  4/16/2020    IR PICC LINE  8/20/2018    IR TUNNELED DIALYSIS CATHETER CHECK/CHANGE/REPOSITION/ANGIOPLASTY  1/8/2020    IR TUNNELED DIALYSIS CATHETER PLACEMENT  10/21/2019    IR TUNNELED DIALYSIS CATHETER REMOVAL  5/29/2020    IR VENOUS LINE REMOVAL  10/5/2018    LEG AMPUTATION Right 02/01/2019    Above the knee    NEPHRECTOMY TRANSPLANTED ORGAN      WI ANASTOMOSIS,AV,ANY SITE Right 2/11/2020    Procedure: CREATION FISTULA ARTERIOVENOUS (AV) right upper extremity;  Surgeon: Alfredo Abdul MD;  Location: [facility] MAIN OR;  Service: Vascular    WI OPEN TREATMENT FRACTURE DISTAL TIBIA FIBULA Right 7/3/2017    Procedure: OPEN REDUCTION W/ INTERNAL FIXATION (ORIF); Surgeon: Sea Pereira MD;  Location: [facility];  Service: Orthopedics    TOE AMPUTATION Right 10/27/2016    Procedure: AMPUTATION TOE;  Surgeon: Fidel Ca DPM;  Location: [facility];  Service:        Family History   Problem Relation Age of Onset    Diabetes Brother     Coronary artery disease Mother     No Known Problems Father      I have reviewed and agree with the history as documented  E-Cigarette/Vaping    E-Cigarette Use Never User      E-Cigarette/Vaping Substances    Nicotine No     THC No     CBD No     Flavoring No     Other No     Unknown No      Social History     Tobacco Use    Smoking status: Never Smoker    Smokeless tobacco: Never Used   Substance Use Topics    Alcohol use: Not Currently    Drug use: Never       Review of Systems   Constitutional: Negative for activity change, chills and fever  HENT: Negative for congestion, ear pain, rhinorrhea, sneezing and sore throat      Eyes: Negative for discharge  Respiratory: Negative for cough and shortness of breath  Cardiovascular: Negative for chest pain and leg swelling  Gastrointestinal: Negative for abdominal pain, blood in stool, diarrhea, nausea and vomiting  Endocrine: Negative for polyuria  Genitourinary: Negative for dysuria, frequency and urgency  Musculoskeletal: Negative for back pain and myalgias  Skin: Negative for rash  Neurological: Negative for dizziness, weakness and numbness  Hematological: Negative for adenopathy  Psychiatric/Behavioral: Negative for confusion  All other systems reviewed and are negative  Physical Exam  Physical Exam  Vitals signs and nursing note reviewed  Constitutional:       General: He is not in acute distress  Appearance: He is well-developed  He is not ill-appearing, toxic-appearing or diaphoretic  Comments: Chronically ill appearing   HENT:      Head: Normocephalic and atraumatic  Right Ear: Tympanic membrane and external ear normal       Left Ear: Tympanic membrane and external ear normal       Nose: Nose normal       Mouth/Throat:      Mouth: Mucous membranes are moist       Pharynx: No oropharyngeal exudate or posterior oropharyngeal erythema  Eyes:      General: No scleral icterus  Right eye: No discharge  Left eye: No discharge  Extraocular Movements: Extraocular movements intact  Conjunctiva/sclera: Conjunctivae normal       Pupils: Pupils are equal, round, and reactive to light  Neck:      Musculoskeletal: Normal range of motion and neck supple  Cardiovascular:      Rate and Rhythm: Normal rate and regular rhythm  Heart sounds: Normal heart sounds  Pulmonary:      Effort: Pulmonary effort is normal  No respiratory distress  Breath sounds: Normal breath sounds  No stridor  No wheezing, rhonchi or rales  Chest:      Chest wall: No tenderness  Abdominal:      General: Bowel sounds are normal  There is no distension  Palpations: Abdomen is soft  There is no mass  Tenderness: There is no abdominal tenderness  There is no right CVA tenderness, left CVA tenderness, guarding or rebound  Comments: Back: no mildline or CVA tenderness   Musculoskeletal: Normal range of motion  General: No tenderness or deformity  Left lower leg: No edema  Comments: RUE AVF with thrill and bruit; Rt AKA   Lymphadenopathy:      Cervical: No cervical adenopathy  Skin:     General: Skin is warm and dry  Capillary Refill: Capillary refill takes less than 2 seconds  Findings: No rash  Neurological:      Mental Status: He is alert and oriented to person, place, and time  Cranial Nerves: No cranial nerve deficit  Sensory: No sensory deficit  Motor: No weakness or abnormal muscle tone        Coordination: Coordination normal    Psychiatric:         Mood and Affect: Mood normal          Vital Signs  ED Triage Vitals   Temperature Pulse Respirations Blood Pressure SpO2   09/16/20 1955 09/16/20 1955 09/16/20 1955 09/16/20 1955 09/16/20 1955   97 8 °F (36 6 °C) 76 20 161/72 (!) 89 %      Temp Source Heart Rate Source Patient Position - Orthostatic VS BP Location FiO2 (%)   09/16/20 1955 09/16/20 1955 09/16/20 2000 09/16/20 2000 --   Temporal Monitor Lying Right arm       Pain Score       --                  Vitals:    09/16/20 2000 09/16/20 2100 09/16/20 2130 09/16/20 2200   BP: 161/72 (!) 180/78 162/73 156/71   Pulse: 73 70 72 68   Patient Position - Orthostatic VS: Lying Lying Lying Lying         Visual Acuity  Visual Acuity      Most Recent Value   L Pupil Size (mm)  3   R Pupil Size (mm)  3          ED Medications  Medications   dextrose 50 % IV solution 50 mL (50 mL Intravenous Given 9/16/20 2030)   vancomycin (VANCOCIN) 1000 mg in sodium chloride 0 9% 250 mL IVPB (1,000 mg Intravenous Given 9/16/20 2220)   cefepime (MAXIPIME) IVPB (premix in dextrose) 1,000 mg 50 mL (0 mg Intravenous Stopped 9/16/20 2213)       Diagnostic Studies  Results Reviewed     Procedure Component Value Units Date/Time    Novel Coronavirus Inge Euceda Guthriebelen Harris [[id number]]  (Normal) Collected:  09/16/20 2134    Lab Status:  Final result Specimen:  Nares from Nose Updated:  09/16/20 2237     SARS-CoV-2 Negative    Narrative: The specimen collection materials, transport medium, and/or testing methodology utilized in the production of these test results have been proven to be reliable in a limited validation with an abbreviated program under the Emergency Utilization Authorization provided by the FDA  Testing reported as "Presumptive positive" will be confirmed with secondary testing with a reference laboratory to ensure result accuracy  Clinical caution and judgement should be used with the interpretation of these results with consideration of the clinical impression and other laboratory testing  Testing reported as "Positive" or "Negative" has been proven to be accurate according to standard laboratory validation requirements  All testing is performed with control materials showing appropriate reactivity at standard intervals  Blood culture #1 [[id number]] Collected:  09/16/20 2134    Lab Status: In process Specimen:  Blood from Hand, Left Updated:  09/16/20 2139    Blood culture #2 [[id number]] Collected:  09/16/20 2134    Lab Status: In process Specimen:  Blood from Arm, Left Updated:  09/16/20 2139    Procalcitonin with AM Reflex [[id number]] Collected:  09/16/20 2134    Lab Status: In process Specimen:  Blood from Arm, Left Updated:  09/16/20 2138    TSH, 3rd generation with Free T4 reflex [[id number]]  (Normal) Collected:  09/16/20 2028    Lab Status:  Final result Specimen:  Blood from Arm, Left Updated:  09/16/20 2102     TSH 3RD GENERATON 1 491 uIU/mL     Narrative:       Patients undergoing fluorescein dye angiography may retain small amounts of fluorescein in the body for 48-72 hours post procedure   Samples containing fluorescein can produce falsely depressed TSH values  If the patient had this procedure,a specimen should be resubmitted post fluorescein clearance  Lipase [[id number]]  (Abnormal) Collected:  09/16/20 2028    Lab Status:  Final result Specimen:  Blood from Arm, Left Updated:  09/16/20 2102     Lipase 48 u/L     Lactic acid [[id number]]  (Normal) Collected:  09/16/20 2028    Lab Status:  Final result Specimen:  Blood from Arm, Left Updated:  09/16/20 2059     LACTIC ACID 0 9 mmol/L     Narrative:       Result may be elevated if tourniquet was used during collection      Troponin I [[id number]]  (Normal) Collected:  09/16/20 2028    Lab Status:  Final result Specimen:  Blood from Arm, Left Updated:  09/16/20 2057     Troponin I <0 02 ng/mL     Fingerstick Glucose (POCT) [[id number]]  (Abnormal) Collected:  09/16/20 2054    Lab Status:  Final result Updated:  09/16/20 2056     POC Glucose 199 mg/dl     Comprehensive metabolic panel [[id number]]  (Abnormal) Collected:  09/16/20 2028    Lab Status:  Final result Specimen:  Blood from Arm, Left Updated:  09/16/20 2055     Sodium 130 mmol/L      Potassium 4 2 mmol/L      Chloride 93 mmol/L      CO2 25 mmol/L      ANION GAP 12 mmol/L      BUN 38 mg/dL      Creatinine 5 12 mg/dL      Glucose 66 mg/dL      Calcium 8 5 mg/dL      AST 17 U/L      ALT 19 U/L      Alkaline Phosphatase 167 U/L      Total Protein 7 8 g/dL      Albumin 3 5 g/dL      Total Bilirubin 0 80 mg/dL      eGFR 12 ml/min/1 73sq m     Narrative:       Christina guidelines for Chronic Kidney Disease (CKD):     Stage 1 with normal or high GFR (GFR > 90 mL/min/1 73 square meters)    Stage 2 Mild CKD (GFR = 60-89 mL/min/1 73 square meters)    Stage 3A Moderate CKD (GFR = 45-59 mL/min/1 73 square meters)    Stage 3B Moderate CKD (GFR = 30-44 mL/min/1 73 square meters)    Stage 4 Severe CKD (GFR = 15-29 mL/min/1 73 square meters)    Stage 5 End Stage CKD (GFR <15 mL/min/1 73 square meters)  Note: GFR calculation is accurate only with a steady state creatinine    CBC and differential [[id number]]  (Abnormal) Collected:  09/16/20 2028    Lab Status:  Final result Specimen:  Blood from Arm, Left Updated:  09/16/20 2041     WBC 7 47 Thousand/uL      RBC 3 45 Million/uL      Hemoglobin 10 7 g/dL      Hematocrit 32 7 %      MCV 95 fL      MCH 31 0 pg      MCHC 32 7 g/dL      RDW 13 8 %      MPV 8 4 fL      Platelets 569 Thousands/uL      nRBC 0 /100 WBCs      Neutrophils Relative 63 %      Immat GRANS % 0 %      Lymphocytes Relative 24 %      Monocytes Relative 9 %      Eosinophils Relative 3 %      Basophils Relative 1 %      Neutrophils Absolute 4 78 Thousands/µL      Immature Grans Absolute 0 02 Thousand/uL      Lymphocytes Absolute 1 76 Thousands/µL      Monocytes Absolute 0 64 Thousand/µL      Eosinophils Absolute 0 21 Thousand/µL      Basophils Absolute 0 06 Thousands/µL     Fingerstick Glucose (POCT) [[id number]]  (Abnormal) Collected:  09/16/20 1955    Lab Status:  Final result Updated:  09/16/20 1956     POC Glucose 61 mg/dl                  XR chest 1 view portable   ED Interpretation by Cindy Tam MD (09/16 2132)   Read by me; Radiologist to provide formal interpretation    Rt sided infiltrate with fliud overload infiltrate new vs  9/4/2020                 Procedures  ECG 12 Lead Documentation Only    Date/Time: 9/16/2020 10:05 PM  Performed by: Cindy Tam MD  Authorized by: Cindy Tam MD     Indications / Diagnosis:  Hypoglycemia  ECG reviewed by me, the ED Provider: yes    Patient location:  ED  Previous ECG:     Previous ECG:  Compared to current    Comparison ECG info:  8/25/2020 2035    Similarity:  No change  Rate:     ECG rate:  72    ECG rate assessment: normal    Rhythm:     Rhythm: sinus rhythm    QRS:     QRS axis:  Normal  Comments:      Nonspecific ST-T changes             ED Course  ED Course as of Sep 16 2242   Wed Sep 16, 2020   2156 Case reviewed with Dr Caridad Fuller nephrology OK to admit to [facility] can accommodate HD tomorrow while hosptialized  Recommends continued 1/2amp D50 2-3 prn for support blood sugar will contact [facility] for admit       2229 Case reviewed with Eulogio Watters NP full admit med/surg to Dr Miller Blood service      2232 Recheck BS 80                                          MDM  Number of Diagnoses or Management Options  ESRD (end stage renal disease) on dialysis [facility]): Hypoglycemia due to insulin:   Hypoxia:   Pneumonia:   Diagnosis management comments: Mdm:  Recurrent hypoglycemia will administer amp of D50 and follow glucose will check for any evidence of infection acute coronary syndrome and re-evaluate  Disposition  Final diagnoses:   Hypoglycemia due to insulin   Pneumonia - rt sided   Hypoxia   ESRD (end stage renal disease) on dialysis [facility])     Time reflects when diagnosis was documented in both MDM as applicable and the Disposition within this note     Time User Action Codes Description Comment    9/16/2020  9:46 PM Cora Duane Add [E16 0,  T38 3X5A] Hypoglycemia due to insulin     9/16/2020  9:47 PM Cora Duane Add [J18 9] Pneumonia     9/16/2020  9:47 PM Cora Duane Modify [J18 9] Pneumonia rt sided    9/16/2020  9:47 PM Cora Duane Add [R09 02] Hypoxia     9/16/2020  9:47 PM Anoop Sanchez Add [N18 6,  Z99 2] ESRD (end stage renal disease) on dialysis [facility])       ED Disposition     ED Disposition Condition Date/Time Comment    Admit Stable Wed Sep 16, 2020 10:31 PM Case was discussed with Eulogio Watters NP  and the patient's admission status was agreed to be Admission Status: inpatient status to the service of Dr Drew Reich   Follow-up Information    None         Patient's Medications   Discharge Prescriptions    No medications on file     No discharge procedures on file      PDMP Review     None          ED Provider  Electronically Signed by           Christen Rose MD  09/16/20 6612

## 2020-09-17 ENCOUNTER — APPOINTMENT (INPATIENT)
Dept: RADIOLOGY | Facility: HOSPITAL | Age: 50
DRG: 189 | End: 2020-09-17
Payer: COMMERCIAL

## 2020-09-17 ENCOUNTER — APPOINTMENT (INPATIENT)
Dept: DIALYSIS | Facility: HOSPITAL | Age: 50
DRG: 189 | End: 2020-09-17
Payer: COMMERCIAL

## 2020-09-17 LAB
ANION GAP SERPL CALCULATED.3IONS-SCNC: 12 MMOL/L (ref 4–13)
ATRIAL RATE: 72 BPM
BUN SERPL-MCNC: 41 MG/DL (ref 5–25)
CALCIUM SERPL-MCNC: 8.1 MG/DL (ref 8.3–10.1)
CHLORIDE SERPL-SCNC: 93 MMOL/L (ref 100–108)
CO2 SERPL-SCNC: 22 MMOL/L (ref 21–32)
CREAT SERPL-MCNC: 5.81 MG/DL (ref 0.6–1.3)
ERYTHROCYTE [DISTWIDTH] IN BLOOD BY AUTOMATED COUNT: 13.6 % (ref 11.6–15.1)
EST. AVERAGE GLUCOSE BLD GHB EST-MCNC: 166 MG/DL
FLUAV RNA NPH QL NAA+PROBE: NORMAL
FLUBV RNA NPH QL NAA+PROBE: NORMAL
GFR SERPL CREATININE-BSD FRML MDRD: 10 ML/MIN/1.73SQ M
GLUCOSE SERPL-MCNC: 141 MG/DL (ref 65–140)
GLUCOSE SERPL-MCNC: 205 MG/DL (ref 65–140)
GLUCOSE SERPL-MCNC: 218 MG/DL (ref 65–140)
GLUCOSE SERPL-MCNC: 230 MG/DL (ref 65–140)
GLUCOSE SERPL-MCNC: 247 MG/DL (ref 65–140)
GLUCOSE SERPL-MCNC: 286 MG/DL (ref 65–140)
GLUCOSE SERPL-MCNC: 299 MG/DL (ref 65–140)
GLUCOSE SERPL-MCNC: 319 MG/DL (ref 65–140)
GLUCOSE SERPL-MCNC: 319 MG/DL (ref 65–140)
HBA1C MFR BLD: 7.4 %
HCT VFR BLD AUTO: 26.2 % (ref 36.5–49.3)
HGB BLD-MCNC: 8.6 G/DL (ref 12–17)
MAGNESIUM SERPL-MCNC: 2.3 MG/DL (ref 1.6–2.6)
MCH RBC QN AUTO: 31 PG (ref 26.8–34.3)
MCHC RBC AUTO-ENTMCNC: 32.8 G/DL (ref 31.4–37.4)
MCV RBC AUTO: 95 FL (ref 82–98)
P AXIS: 62 DEGREES
PHOSPHATE SERPL-MCNC: 6 MG/DL (ref 2.7–4.5)
PLATELET # BLD AUTO: 191 THOUSANDS/UL (ref 149–390)
PMV BLD AUTO: 8.4 FL (ref 8.9–12.7)
POTASSIUM SERPL-SCNC: 4.2 MMOL/L (ref 3.5–5.3)
PR INTERVAL: 136 MS
PROCALCITONIN SERPL-MCNC: 0.34 NG/ML
PROCALCITONIN SERPL-MCNC: 0.38 NG/ML
QRS AXIS: 54 DEGREES
QRSD INTERVAL: 94 MS
QT INTERVAL: 412 MS
QTC INTERVAL: 451 MS
RBC # BLD AUTO: 2.77 MILLION/UL (ref 3.88–5.62)
RSV RNA NPH QL NAA+PROBE: NORMAL
SODIUM SERPL-SCNC: 127 MMOL/L (ref 136–145)
T WAVE AXIS: 77 DEGREES
VENTRICULAR RATE: 72 BPM
WBC # BLD AUTO: 5.92 THOUSAND/UL (ref 4.31–10.16)

## 2020-09-17 PROCEDURE — 90935 HEMODIALYSIS ONE EVALUATION: CPT | Performed by: INTERNAL MEDICINE

## 2020-09-17 PROCEDURE — 93010 ELECTROCARDIOGRAM REPORT: CPT | Performed by: INTERNAL MEDICINE

## 2020-09-17 PROCEDURE — 5A1D70Z PERFORMANCE OF URINARY FILTRATION, INTERMITTENT, LESS THAN 6 HOURS PER DAY: ICD-10-PCS | Performed by: INTERNAL MEDICINE

## 2020-09-17 PROCEDURE — 84145 PROCALCITONIN (PCT): CPT | Performed by: NURSE PRACTITIONER

## 2020-09-17 PROCEDURE — 84100 ASSAY OF PHOSPHORUS: CPT | Performed by: NURSE PRACTITIONER

## 2020-09-17 PROCEDURE — 82948 REAGENT STRIP/BLOOD GLUCOSE: CPT

## 2020-09-17 PROCEDURE — 99254 IP/OBS CNSLTJ NEW/EST MOD 60: CPT | Performed by: INTERNAL MEDICINE

## 2020-09-17 PROCEDURE — 71045 X-RAY EXAM CHEST 1 VIEW: CPT

## 2020-09-17 PROCEDURE — 83036 HEMOGLOBIN GLYCOSYLATED A1C: CPT | Performed by: NURSE PRACTITIONER

## 2020-09-17 PROCEDURE — 83735 ASSAY OF MAGNESIUM: CPT | Performed by: NURSE PRACTITIONER

## 2020-09-17 PROCEDURE — 99254 IP/OBS CNSLTJ NEW/EST MOD 60: CPT | Performed by: NURSE PRACTITIONER

## 2020-09-17 PROCEDURE — 99232 SBSQ HOSP IP/OBS MODERATE 35: CPT | Performed by: FAMILY MEDICINE

## 2020-09-17 PROCEDURE — 85027 COMPLETE CBC AUTOMATED: CPT | Performed by: NURSE PRACTITIONER

## 2020-09-17 PROCEDURE — 80048 BASIC METABOLIC PNL TOTAL CA: CPT | Performed by: NURSE PRACTITIONER

## 2020-09-17 RX ADMIN — METOPROLOL SUCCINATE 50 MG: 50 TABLET, EXTENDED RELEASE ORAL at 08:36

## 2020-09-17 RX ADMIN — CARVEDILOL 6.25 MG: 3.12 TABLET, FILM COATED ORAL at 16:52

## 2020-09-17 RX ADMIN — INSULIN DETEMIR 10 UNITS: 100 INJECTION, SOLUTION SUBCUTANEOUS at 08:37

## 2020-09-17 RX ADMIN — INSULIN DETEMIR 6 UNITS: 100 INJECTION, SOLUTION SUBCUTANEOUS at 21:28

## 2020-09-17 RX ADMIN — GUAIFENESIN 600 MG: 600 TABLET ORAL at 17:29

## 2020-09-17 RX ADMIN — ASPIRIN 81 MG 81 MG: 81 TABLET ORAL at 08:36

## 2020-09-17 RX ADMIN — CEFEPIME HYDROCHLORIDE 1000 MG: 1 INJECTION, SOLUTION INTRAVENOUS at 14:46

## 2020-09-17 RX ADMIN — AZATHIOPRINE 50 MG: 50 TABLET ORAL at 08:36

## 2020-09-17 RX ADMIN — PREDNISONE 5 MG: 5 TABLET ORAL at 08:36

## 2020-09-17 RX ADMIN — ISOSORBIDE MONONITRATE 30 MG: 30 TABLET, EXTENDED RELEASE ORAL at 08:36

## 2020-09-17 RX ADMIN — INSULIN LISPRO 3 UNITS: 100 INJECTION, SOLUTION INTRAVENOUS; SUBCUTANEOUS at 07:53

## 2020-09-17 RX ADMIN — TACROLIMUS 1 MG: 1 CAPSULE ORAL at 20:25

## 2020-09-17 RX ADMIN — ATORVASTATIN CALCIUM 80 MG: 40 TABLET, FILM COATED ORAL at 08:37

## 2020-09-17 RX ADMIN — TACROLIMUS 1 MG: 1 CAPSULE ORAL at 07:50

## 2020-09-17 RX ADMIN — NIFEDIPINE 90 MG: 30 TABLET, FILM COATED, EXTENDED RELEASE ORAL at 08:36

## 2020-09-17 RX ADMIN — CARVEDILOL 6.25 MG: 3.12 TABLET, FILM COATED ORAL at 07:50

## 2020-09-17 RX ADMIN — TACROLIMUS 1 MG: 1 CAPSULE ORAL at 14:47

## 2020-09-17 RX ADMIN — INSULIN LISPRO 2 UNITS: 100 INJECTION, SOLUTION INTRAVENOUS; SUBCUTANEOUS at 16:53

## 2020-09-17 NOTE — UTILIZATION REVIEW
Initial Clinical Review    Admission: Date/Time/Statement:   Admission Orders (From admission, onward)     Ordered        09/16/20 2231  Inpatient Admission (expected length of stay for this patient Order details is greater than two midnights)  Once                   Orders Placed This Encounter   Procedures    Inpatient Admission (expected length of stay for this patient Order details is greater than two midnights)     Standing Status:   Standing     Number of Occurrences:   1     Order Specific Question:   Admitting Physician     Answer:   Edel Severe [[id number]]     Order Specific Question:   Level of Care     Answer:   Med Surg [16]     Order Specific Question:   Estimated length of stay     Answer:   More than 2 Midnights     Order Specific Question:   Certification     Answer:   I certify that inpatient services are medically necessary for this patient for a duration of greater than two midnights  See H&P and MD Progress Notes for additional information about the patient's course of treatment  ED Arrival Information     Expected Arrival Acuity Means of Arrival Escorted By Service Admission Type    - 9/16/2020 19:48 Urgent Wheelchair Spouse Hospitalist Urgent    Arrival Complaint    Low Blood Sugar        Chief Complaint   Patient presents with    Hypoglycemia - no symptoms     Pt states he has been having trouble keeping his blood sugar up all day  Last reading at home 69  Assessment/Plan:   47 yom ambulatory to er from home c/o recurrent hypoglycemia with blood sugars in 40's at home  Hx type 1 diabetic with history of renal transplant; he is on hemodialysis Tuesday Thursday Saturday, R AKA, cad, hld, htn, mi  Patient was recently admitted on from 8/25-8/27 with acute pulmonary edema and pneumonia and respiratory failure, on BiPAP underwent dialysis, +blood culture, signed out AMA  Has been compliant with OP HD tx  Presents hypoxic on RA, RUE AVF with thrill and bruit   Admission work-up showing hyponatremia, mere, blood sugar 61 (tx with amp D50), elevated procalcitonin, CHF with RLL infiltrate on imaging  Admitted to inpatient status for acute hypoxic respiratory failure with hypoxia 2nd recurrent HCAP  Started on ivabt, O2 @ 2ltr, pulmonary & renal consulted      ED Triage Vitals   Temperature Pulse Respirations Blood Pressure SpO2   09/16/20 1955 09/16/20 1955 09/16/20 1955 09/16/20 1955 09/16/20 1955   97 8 °F (36 6 °C) 76 20 161/72 (!) 89 %      Temp Source Heart Rate Source Patient Position - Orthostatic VS BP Location FiO2 (%)   09/16/20 1955 09/16/20 1955 09/16/20 2000 09/16/20 2000 --   Temporal Monitor Lying Right arm       Pain Score       09/16/20 2310       No Pain          Wt Readings from Last 1 Encounters:   09/17/20 59 kg (130 lb 1 1 oz)     Additional Vital Signs:   09/16/20 2130      72   16   162/73   105   98 %   28   2 L/min   Nasal cannula   Lying    09/16/20 2100      70   18   180/78Abnormal     112   99 %   28   2 L/min   Nasal cannula   Lying    09/16/20 2000      73   20   161/72   104   94 %   28   2 L/min   Nasal cannula   Lying    09/16/20 1958                  89 %Abnormal                  09/16/20 1955   97 8 °F (36 6 °C)   76   20   161/72      89 %Abnormal           None (Room air)       Pertinent Labs/Diagnostic Test Results:   Results from last 7 days   Lab Units 09/16/20 2134   SARS-COV-2  Negative     Results from last 7 days   Lab Units 09/17/20 0542 09/16/20 2028   WBC Thousand/uL 5 92 7 47   HEMOGLOBIN g/dL 8 6* 10 7*   HEMATOCRIT % 26 2* 32 7*   PLATELETS Thousands/uL 191 253   NEUTROS ABS Thousands/µL  --  4 78     Results from last 7 days   Lab Units 09/17/20 0542 09/16/20 2028   SODIUM mmol/L 127* 130*   POTASSIUM mmol/L 4 2 4 2   CHLORIDE mmol/L 93* 93*   CO2 mmol/L 22 25   ANION GAP mmol/L 12 12   BUN mg/dL 41* 38*   CREATININE mg/dL 5 81* 5 12*   EGFR ml/min/1 73sq m 10 12   CALCIUM mg/dL 8 1* 8 5   MAGNESIUM mg/dL 2 3  --    PHOSPHORUS mg/dL 6 0*  --      Results from last 7 days   Lab Units 09/16/20  2028   AST U/L 17   ALT U/L 19   ALK PHOS U/L 167*   TOTAL PROTEIN g/dL 7 8   ALBUMIN g/dL 3 5   TOTAL BILIRUBIN mg/dL 0 80     Results from last 7 days   Lab Units 09/17/20  0944 09/17/20  0728 09/17/20  0526 09/17/20  0328 09/17/20  0127 09/16/20  2304 09/16/20  2054 09/16/20  1955   POC GLUCOSE mg/dl 247* 319* 286* 299* 205* 189* 199* 61*     Results from last 7 days   Lab Units 09/17/20  0542 09/16/20  2028   GLUCOSE RANDOM mg/dL 319* 66     Results from last 7 days   Lab Units 09/17/20  0020   HEMOGLOBIN A1C % 7 4*   EAG mg/dl 166     BETA-HYDROXYBUTYRATE   Date Value Ref Range Status   12/01/2019 0 3 <0 6 mmol/L Final      Results from last 7 days   Lab Units 09/16/20 2028   TROPONIN I ng/mL <0 02     Results from last 7 days   Lab Units 09/16/20  2028   TSH 3RD GENERATON uIU/mL 1 491     Results from last 7 days   Lab Units 09/17/20  0020 09/16/20  2134   PROCALCITONIN ng/ml 0 34* 0 38*     Results from last 7 days   Lab Units 09/16/20 2028   LACTIC ACID mmol/L 0 9     Results from last 7 days   Lab Units 09/16/20 2028   LIPASE u/L 48*     Results from last 7 days   Lab Units 09/16/20  2304   INFLUENZA A PCR  None Detected   INFLUENZA B PCR  None Detected   RSV PCR  None Detected     Results from last 7 days   Lab Units 09/16/20  2134   BLOOD CULTURE  Received in Microbiology Lab  Culture in Progress  Received in Microbiology Lab  Culture in Progress  9/16  Cxr= 1  Cardiomegaly and mild CHF    2   Suspected superimposed infiltrate in the right lower lobe  Ekg= Rhythm: sinus rhythm    QRS:     QRS axis:  Normal  Comments:    Nonspecific ST-T changes    ED Treatment:   Medication Administration from 09/16/2020 1947 to 09/16/2020 2248       Date/Time Order Dose Route Action     09/16/2020 2030 dextrose 50 % IV solution 50 mL 50 mL Intravenous Given     09/16/2020 2220 vancomycin (VANCOCIN) 1000 mg in sodium chloride 0 9% 250 mL IVPB 1,000 mg Intravenous Given     09/16/2020 2143 cefepime (MAXIPIME) IVPB (premix in dextrose) 1,000 mg 50 mL 1,000 mg Intravenous New Bag        Past Medical History:   Diagnosis Date    Bacteremia 12/21/2018    CAD (coronary artery disease)     s/p MARC to LCx, pLAD 2/2018    Cardiac arrest ([place] [address] )     Chronic kidney disease     Diabetes mellitus type 1 ([place] [address] )     Dialysis patient ([place] [address] )     Access in right chest    GI bleed     Hyperlipidemia     Hypertension     Infection at site of external fixator pin ([place] [address] )     MI (myocardial infarction) ([place] [address] )     Pneumonia     Last Assessed [id number]    Renal failure     Renal transplant, status post 07/21/2007     Present on Admission:   Type 1 diabetes mellitus ([place] [address] )   HCAP (healthcare-associated pneumonia)   Immunosuppression ([place] [address] )   Hypertension   Chronic anemia   Acute respiratory failure with hypoxia (HCC)  Admitting Diagnosis: Pneumonia [J18 9]  Low blood sugar [E16 2]  Hypoxia [R09 02]  ESRD (end stage renal disease) on dialysis ([place] [address] ) [N18 6, Z99 2]  Hypoglycemia due to insulin [E16 0, T38 3X5A]  Age/Sex: 47 y o  male  Admission Orders:  accuchecks with coverage scale  Incentive spirometry  Pt/ot eval & tx  Droplet isolation  Consult renal  Consult pulmonary  HD tx 3x/wk    Scheduled Medications:  aspirin, 81 mg, Oral, QAM  atorvastatin, 80 mg, Oral, QAM  azaTHIOprine, 50 mg, Oral, QAM  carvedilol, 6 25 mg, Oral, BID With Meals  cefepime, 1,000 mg, Intravenous, Q24H  guaiFENesin, 600 mg, Oral, BID  heparin (porcine), 5,000 Units, Subcutaneous, Q8H JAMARCUS  insulin detemir, 10 Units, Subcutaneous, BID  insulin lispro, 1-5 Units, Subcutaneous, TID AC  insulin lispro, 1-5 Units, Subcutaneous, HS  isosorbide mononitrate, 30 mg, Oral, Daily  metoprolol succinate, 50 mg, Oral, QAM  NIFEdipine, 90 mg, Oral, Daily  pantoprazole, 40 mg, Oral, Early Morning  predniSONE, 5 mg, Oral, Daily  tacrolimus, 1 mg, Oral, TID    PRN Meds:  acetaminophen, 650 mg, Oral, Q6H PRN  albuterol, 2 5 mg, Nebulization, Q4H PRN  dextrose, 25 mL, Intravenous, Q2H PRN  vancomycin, 10 mg/kg, Intravenous, After Dialysis    Network Utilization Review Department  [email or web address]  ATTENTION: Please call with any questions or concerns to [phone] and carefully listen to the prompts so that you are directed to the right person  All voicemails are confidential   Jnenifer Bee all requests for admission clinical reviews, approved or denied determinations and any other requests to dedicated fax number below belonging to the campus where the patient is receiving treatment   List of dedicated fax numbers for the Facilities:  [address] DENIALS (Administrative/Medical Necessity) [phone]   [address] (Maternity/NICU/Pediatrics) [phone]   Eugenio Doan [phone]   Susana Tinsley [phone]   Micah Hyde [phone]   Sam Lund [phone]   1205 [facility] 1525 [facility] [phone]   [facility]  [phone]   [address] [place] [address], [address]  2401 [facility] 1000 [address] [facility] [phone]

## 2020-09-17 NOTE — ASSESSMENT & PLAN NOTE
As evidence by a sodium level of 130 on admission  Most likely secondary to hypovolemia patient to receive hemodialysis tomorrow

## 2020-09-17 NOTE — PROGRESS NOTES
Progress Note - Nuris Jamil 1970, 47 y o  male MRN: [id number]    Unit/Bed#: 417-01 Encounter: [id number]    Primary Care Provider: Miguel Angel Bolanos DO   Date and time admitted to hospital: 9/16/2020  7:50 PM        Acute respiratory failure with hypoxia [facility])  Assessment & Plan  Likely due to pulmonary vascular congestion and right sided pneumonia  Will consult pulmonary medicine given recurrence of right sided pneumonia    HCAP (healthcare-associated pneumonia)  Assessment & Plan  Recent hospitalization for right lower lobe pneumonia CAP  Day 2 vancomycin and cefepime  Follow-up urinary antigens  Follow-up blood cultures  Pulmonary consult given recurrence of right-sided pneumonia    Acute on chronic respiratory failure with hypoxia [facility])  Assessment & Plan  Acute on chronic respiratory failure with hypoxia as evidence by SpO2 of 89% on room air at rest   Awaiting official radiological read on chest x-ray-my interpretation is right lower lobe pneumonia and moderate vascular congestion  Please see additional treatment plan for healthcare associated pneumonia and end-stage renal disease     ESRD (end stage renal disease) on dialysis [facility])  Assessment & Plan  Patient requires hemodialysis Tuesday Thursday Saturday  Nephro consult and HD today     Hyponatremia  Assessment & Plan  As evidence by a sodium level of 130 on admission  HD today    Type 1 diabetes mellitus [facility])  Assessment & Plan  Lab Results   Component Value Date    HGBA1C 7 4 (H) 09/17/2020       Recent Labs     09/17/20  0127 09/17/20  0328 09/17/20  0526 09/17/20  0728   POCGLU 205* 299* 286* 319*       Blood Sugar Average: Last 72 hrs:  (P) 222 [id number]     Patient's fasting blood glucose is greater than 300 this morning  Resume Levemir  Insulin sliding scale with Accu-Cheks AC and HS  Continue hypoglycemic protocol        Progress Note - Nuris Jamil 47 y o  male MRN: [id number]    Unit/Bed#: 417-01 Encounter: [id number]        Subjective:   Patient seen and examined at bedside  He offers no acute complaints     Objective:     Vitals:   Vitals:    09/17/20 0729   BP: 131/63   Pulse: 77   Resp: 18   Temp:    SpO2: 95%     Body mass index is 22 33 kg/m²      Intake/Output Summary (Last 24 hours) at 9/17/2020 0939  Last data filed at 9/16/2020 2213  Gross per 24 hour   Intake 50 ml   Output    Net 50 ml       Physical Exam:   /63   Pulse 77   Temp 98 4 °F (36 9 °C)   Resp 18   Wt 59 kg (130 lb 1 1 oz)   SpO2 95%   BMI 22 33 kg/m²   General appearance: alert and oriented, in no acute distress  Head: Normocephalic, without obvious abnormality, atraumatic  Lungs: clear to auscultation bilaterally  Heart: regular rate and rhythm, S1, S2 normal, no murmur, click, rub or gallop  Abdomen: soft, non-tender; bowel sounds normal; no masses,  no organomegaly  Extremities: extremities normal, warm and well-perfused; no cyanosis, clubbing, or edema  Neurologic: Grossly normal     Invasive Devices     Peripheral Intravenous Line            Peripheral IV 09/16/20 Left Antecubital less than 1 day          Line            Hemodialysis AV Fistula Right Upper arm -- days                Results from last 7 days   Lab Units 09/17/20  0542 09/16/20 2028   WBC Thousand/uL 5 92 7 47   HEMOGLOBIN g/dL 8 6* 10 7*   HEMATOCRIT % 26 2* 32 7*   PLATELETS Thousands/uL 191 253       Results from last 7 days   Lab Units 09/17/20  0542 09/16/20 2028   POTASSIUM mmol/L 4 2 4 2   CHLORIDE mmol/L 93* 93*   CO2 mmol/L 22 25   BUN mg/dL 41* 38*   CREATININE mg/dL 5 81* 5 12*   CALCIUM mg/dL 8 1* 8 5   ALK PHOS U/L  --  167*   ALT U/L  --  19   AST U/L  --  17       Medication Administration - last 24 hours from 09/16/2020 0939 to 09/17/2020 0202       Date/Time Order Dose Route Action Action by     09/16/2020 2030 dextrose 50 % IV solution 50 mL 50 mL Intravenous Given Dayron Varela RN     09/16/2020 2220 vancomycin (VANCOCIN) 1000 mg in sodium chloride 0 9% 250 mL IVPB 1,000 mg Intravenous Given Jackie Amor, RN     09/16/2020 2213 cefepime (MAXIPIME) IVPB (premix in dextrose) 1,000 mg 50 mL 0 mg Intravenous Stopped Jackie Amor, RN     09/16/2020 2143 cefepime (MAXIPIME) IVPB (premix in dextrose) 1,000 mg 50 mL 1,000 mg Intravenous New Bag Eris Caballero, RN     09/17/2020 5906 aspirin chewable tablet 81 mg 81 mg Oral Given Precious Sommer, RN     09/17/2020 0837 atorvastatin (LIPITOR) tablet 80 mg 80 mg Oral Given Negaurav Sommer, RN     09/17/2020 0836 azaTHIOprine (IMURAN) tablet 50 mg 50 mg Oral Given Precious Sommer, RN     09/17/2020 0750 carvedilol (COREG) tablet 6 25 mg 6 25 mg Oral Given Precious Sommer, RN     09/17/2020 0836 isosorbide mononitrate (IMDUR) 24 hr tablet 30 mg 30 mg Oral Given Precious Sommer, RN     09/17/2020 0836 metoprolol succinate (TOPROL-XL) 24 hr tablet 50 mg 50 mg Oral Given Precious Sommer, RN     09/17/2020 0836 NIFEdipine (PROCARDIA XL) 24 hr tablet 90 mg 90 mg Oral Given Precious Sommer, RN     09/17/2020 0527 pantoprazole (PROTONIX) EC tablet 40 mg 0 mg Oral Hold Mary Kay Ca RN     09/17/2020 0750 tacrolimus (PROGRAF) capsule 1 mg 1 mg Oral Given Precious Sommer, GERMAN     09/16/2020 2301 tacrolimus (PROGRAF) capsule 1 mg 1 mg Oral Not Given Mary Kay Ca RN     09/17/2020 2167 predniSONE tablet 5 mg 5 mg Oral Given Precious Sommer RN     09/17/2020 0753 insulin lispro (HumaLOG) 100 units/mL subcutaneous injection 1-5 Units 3 Units Subcutaneous Given Precious Sommer, GERMAN     09/16/2020 2313 insulin lispro (HumaLOG) 100 units/mL subcutaneous injection 1-5 Units 1 Units Subcutaneous Refused Mary Kay Ca RN     09/17/2020 0836 guaiFENesin (MUCINEX) 12 hr tablet 600 mg 600 mg Oral Not Given Precious Sommer RN     09/17/2020 0552 heparin (porcine) subcutaneous injection 5,000 Units 5,000 Units Subcutaneous Refused Mary Kay Ca RN     09/16/2020 7023 heparin (porcine) subcutaneous injection 5,000 Units 5,000 Units Subcutaneous Refused Francoise Muniz RN     09/17/2020 5209 insulin detemir (LEVEMIR) subcutaneous injection 10 Units 10 Units Subcutaneous Given Milana Wisdom RN            Lab, Imaging and other studies: I have personally reviewed pertinent reports      VTE Pharmacologic Prophylaxis: Heparin  VTE Mechanical Prophylaxis: sequential compression device     Jenni Welch MD  9/17/2020,9:39 AM

## 2020-09-17 NOTE — ASSESSMENT & PLAN NOTE
As evidence by hemoglobin level of 10 7 probably most likely be secondary to chronic disease including end-stage renal disease  No signs and symptoms of  bleeding

## 2020-09-17 NOTE — OCCUPATIONAL THERAPY NOTE
Occupational Therapy         Patient Name: Yue [id number] Date: 9/17/2020    Order received and chart review performed; pt receiving dialysis this AM; will attempt as able for OT evaluation with recommendations

## 2020-09-17 NOTE — HEMODIALYSIS
Stable tx today, pt tolerated well  Denies any complaints post   Post wt  47 kg  Pre tx weight is questionable

## 2020-09-17 NOTE — CONSULTS
Selvin Sy Giana 47 y o  male MRN: [id number]  Unit/Bed#: 417-01 Encounter: [id number]        Assessment and Plan:    1  ESRD on HD TTS at [facility]  · He underwent hemodialysis treatment today with 2 5 L ultrafiltration  Tolerated without event  Next treatment will be Saturday, 9/19 as scheduled  Access: R AVF  · Continue renal diet with fluid restriction  2  Chronic Immunosuppression  · History of LRRT  Continue current medications which include tacrolimus, azathioprine, and daily prednisone  3  Secondary Hyperparathyroidism  · Phosphorus elevated today likely due to 2 cans of Coke prior to presentation which is notoriously high in phosphorus  Typically his phosphorus is controlled  Will continue to monitor for now  4  Anemia of CKD  · Hgb trending down 10 7 g/dL -> 8 6 g/dL  Unclear etiology  Hopefully inaccurate  Should recheck in AM  5  Hypertension in the setting of ESRD  · Blood pressure is controlled  6  Diabetic Nephropathy  7  Type 1 Diabetes Mellitus  8  Acute Hypoxic Respiratory Failure  · Liberated from oxygen after volume removal  Being seen in consultation by pulm who do not feel strongly this is pna      HPI:    Kar Bee is a 47 y o  male with an active problem list significant for ESRD on HD, s/p LRRT with nephropathy and failure, T1DM, CAD s/p stenting, hypertension, diabetic nephropathy, who presented to [facility] ER 9/16 with chief complaint of hyperglycemia  He was noted to have pulsox of 89% in ER therefore initiated on nasal casal cannula  Chest x-ray showed cardiomegaly with congestive heart failure and possible pneumonia  He is being seen in consultation by pulmonology  A renal consultation was requested for ESRD on HD  Upon discussion with the patient, he relays HPI as above  States all day yesterday he was hypoglycemic with blood sugars ranging from 60s-110s  He states he drank two diet soda's back to back to raise his sugar   He feels this is why he was volume overloaded and short of breath  He is requesting to have his HD time extended in clinic for additional volume removal         The medical record, including Care Everywhere and media tabs were reviewed  Reason for Consult: ESRD on HD     Review of Systems:  A complete 10-point review of systems was performed  Aside from what was mentioned in the HPI, it is otherwise negative  Historical Information   Past Medical History:   Diagnosis Date    Bacteremia 12/21/2018    CAD (coronary artery disease)     s/p MARC to LCx, pLAD 2/2018    Cardiac arrest (HCC)     Chronic kidney disease     Diabetes mellitus type 1 ([facility] [address] )     Dialysis patient [facility])     Access in right chest    GI bleed     Hyperlipidemia     Hypertension     Infection at site of external fixator pin ([facility] [address] )     [facility]     Pneumonia     Last Assessed 26Feb2013    Renal failure     Renal transplant, status post 07/21/2007     Past Surgical History:   Procedure Laterality Date    AMPUTATION Right 02/2019    aboce knee    ANKLE FRACTURE SURGERY      ANKLE HARDWARE REMOVAL Right 7/31/2017    Procedure: REMOVAL HARDWARE ANKLE;  Surgeon: Elva Pelaez MD;  [facility];  Service: Orthopedics    ANKLE HARDWARE REMOVAL Right 8/17/2017    Procedure: TIBIA FAILED HARDWARE REMOVAL;  Surgeon: Elva Pelaez MD;  [facility];  Service: Orthopedics    CLOSED REDUCTION ANKLE Right 7/3/2017    Procedure: CLOSED REDUCTION DISTAL TIB-FIB AND CASTING VS;  Surgeon: Elva Pelaez MD;  [facility];  Service: Orthopedics    CORONARY ANGIOPLASTY WITH STENT PLACEMENT  02/2018    CAD s/p MARC to LCx, pLAD    ESOPHAGOGASTRODUODENOSCOPY N/A 12/20/2018    Procedure: ESOPHAGOGASTRODUODENOSCOPY (EGD) in ICU; Surgeon: Corky King MD;  [facility];   Service: Gastroenterology    EYE SURGERY      FRACTURE SURGERY      ORIF Rt Ankle    GLUTAMIC ACID DECARBOXYLASE (HISTORICAL)      IR AV FISTULAGRAM/GRAFTOGRAM  4/16/2020    IR PICC LINE  8/20/2018    IR TUNNELED DIALYSIS CATHETER CHECK/CHANGE/REPOSITION/ANGIOPLASTY  1/8/2020    IR TUNNELED DIALYSIS CATHETER PLACEMENT  10/21/2019    IR TUNNELED DIALYSIS CATHETER REMOVAL  5/29/2020    IR VENOUS LINE REMOVAL  10/5/2018    LEG AMPUTATION Right 02/01/2019    Above the knee    NEPHRECTOMY TRANSPLANTED ORGAN      MT ANASTOMOSIS,AV,ANY SITE Right 2/11/2020    Procedure: CREATION FISTULA ARTERIOVENOUS (AV) right upper extremity;  Surgeon: Manuel Ennis MD;  Location: [facility] MAIN OR;  Service: Vascular    MT OPEN TREATMENT FRACTURE DISTAL TIBIA FIBULA Right 7/3/2017    Procedure: OPEN REDUCTION W/ INTERNAL FIXATION (ORIF);   Surgeon: Emmie Medeiros MD;  [facility]: [facility];  Service: Orthopedics    TOE AMPUTATION Right 10/27/2016    Procedure: AMPUTATION TOE;  Surgeon: Yaya Bonilla DPM;  [facility];  Service:      Social History   Social History     Substance and Sexual Activity   Alcohol Use Not Currently     Social History     Substance and Sexual Activity   Drug Use Never     Social History     Tobacco Use   Smoking Status Never Smoker   Smokeless Tobacco Never Used       Family History:   Family History   Problem Relation Age of Onset    Diabetes Brother     Coronary artery disease Mother     No Known Problems Father        Medications:  Pertinent medications were reviewed  Current Facility-Administered Medications   Medication Dose Route Frequency Provider Last Rate    acetaminophen  650 mg Oral Q6H PRN Angella Flash, CRNP      albuterol  2 5 mg Nebulization Q4H PRN Angella Flash, CRNP      aspirin  81 mg Oral QAM Elsa Y Swank, CRNP      atorvastatin  80 mg Oral QAM Elsa Y Swank, CRNP      azaTHIOprine  50 mg Oral QAM Elsa Y Swank, CRNP      carvedilol  6 25 mg Oral BID With Meals Elsa Y Swank, CRNP      cefepime  1,000 mg Intravenous Q24H Elsa Y Swank, CRNP 1,000 mg (09/17/20 8406)  dextrose  25 mL Intravenous Q2H PRN Zhou Joseph, GAVINO      guaiFENesin  600 mg Oral BID Elsa Y Swank, CRNP      heparin (porcine)  5,000 Units Subcutaneous [facility] Zhou Joseph, GAVINO      insulin detemir  10 Units Subcutaneous BID Sal Rubalcava MD      insulin lispro  1-5 Units Subcutaneous TID AC Zhou Joseph, GAVINO      insulin lispro  1-5 Units Subcutaneous HS Elsa Y Swank, CRNP      isosorbide mononitrate  30 mg Oral Daily Elsa Y Swank, CRNP      metoprolol succinate  50 mg Oral QAM Elsa Y Swank, CRNP      NIFEdipine  90 mg Oral Daily Elsa Y Swank, CRNP      pantoprazole  40 mg Oral Early Morning Elsa Y Swank, CRNP      predniSONE  5 mg Oral Daily Elsa Y Swank, CRNP      tacrolimus  1 mg Oral TID Elsa Y Swank, CRNP      vancomycin  10 mg/kg Intravenous After Dialysis Saundra Y Swank, CRNP           No Known Allergies      Vitals:   /62 (BP Location: Left arm)   Pulse 75   Temp 98 1 °F (36 7 °C) (Oral)   Resp 18   Wt 59 kg (130 lb 1 1 oz)   SpO2 95%   BMI 22 33 kg/m²   Body mass index is 22 33 kg/m²    SpO2: 95 %,   SpO2 Activity: At Rest,   O2 Device: Nasal cannula      Intake/Output Summary (Last 24 hours) at 9/17/2020 1525  Last data filed at 9/17/2020 1300  Gross per 24 hour   Intake 1290 ml   Output    Net 1290 ml     Invasive Devices     Peripheral Intravenous Line            Peripheral IV 09/16/20 Left Antecubital less than 1 day          Line            Hemodialysis AV Fistula Right Upper arm -- days                Physical Exam:  General: conscious, cooperative, in no acute distress  Eyes: conjunctivae pink, anicteric sclerae  ENT: lips and mucous membranes moist  Neck: supple, no JVD, no masses  Chest: diminished breath sounds bilateral, no crackles, ronchus or wheezings  CVS: distinct S1 & S2, normal rate, regular rhythm  Abdomen: soft, non-tender, non-distended, normoactive bowel sounds  Extremities: right aka  Skin: no rash  Neuro: awake, alert, oriented      Diagnostic Data:  Lab: I have personally reviewed pertinent lab results  ,   CBC:  Results from last 7 days   Lab Units 09/17/20  0542   WBC Thousand/uL 5 92   HEMOGLOBIN g/dL 8 6*   HEMATOCRIT % 26 2*   PLATELETS Thousands/uL 191      CMP:   Lab Results   Component Value Date    SODIUM 127 (L) 09/17/2020    K 4 2 09/17/2020    CL 93 (L) 09/17/2020    CO2 22 09/17/2020    BUN 41 (H) 09/17/2020    CREATININE 5 81 (H) 09/17/2020    CALCIUM 8 1 (L) 09/17/2020    AST 17 09/16/2020    ALT 19 09/16/2020    ALKPHOS 167 (H) 09/16/2020    EGFR 10 09/17/2020   ,   PT/INR: No results found for: PT, INR,   Magnesium: No components found for: MAG,  Phosphorous:   Lab Results   Component Value Date    PHOS 6 0 (H) 09/17/2020       Microbiology:  @LABRCNTIP,(urinecx:7)@        GAVINO Quiroz    Portions of the record may have been created with voice recognition software  Occasional wrong word or "sound a like" substitutions may have occurred due to the inherent limitations of voice recognition software  Read the chart carefully and recognize, using context, where substitutions have occurred

## 2020-09-17 NOTE — PROGRESS NOTES
HEMODIALYSIS PROCEDURE NOTE  The patient was seen and examined on hemodialysis  Time: 3 25 hours  Sodium: 138 Blood flow: 400   Dialyzer: F160 Potassium: 2 Dialysate flow: 1 5x   Access: AVF Bicarbonate: 30 Ultrafiltration goal: 2L   Medications on HD: none     Patient tolerating treatment well, he was seen examined twice while on hemodialysis  Time was reduced from 4 hours down to 3 25 hours  No change in ultrafiltration was necessary  No electrolyte abnormalities noted no adjustments made either      Sudheer Party, DO

## 2020-09-17 NOTE — ASSESSMENT & PLAN NOTE
Acute on chronic respiratory failure with hypoxia as evidence by SpO2 of 89% on room air at rest   Awaiting official radiological read on chest x-ray-my interpretation is right lower lobe pneumonia and moderate vascular congestion  Please see additional treatment plan for healthcare associated pneumonia and end-stage renal disease

## 2020-09-17 NOTE — CONSULTS
Consultation - Pulmonary Medicine   Shala Esparza 47 y o  male MRN: [id number]  Unit/Bed#: 417-01 Encounter: [id number]      Assessment/Plan:     Recurrent pneumonia   -patient has no symptoms of pneumonia, no cough no WBC elevation and a procalcitonin that is mildly elevated but in light of his end-stage renal disease and had stability since his last visit I do not think he has a bacterial pneumonia  Given his history with high intake of fluids during his hypoglycemia yesterday I suggested repeating a chest x-ray  We did a stat a chest x-ray and I reviewed it at the bedside and it seems to show resolution of his right lower lobe pneumonia indicated since probably fluid retention  After dialysis his chest x-ray is back to normal and I would discontinue antibiotics free to discharged to home    End-stage renal disease on HD after renal transplant   -high volume removed to get to dry weight solidifies above diagnosis  Diabetes type 1 with hypoglycemia   -unclear etiology of hypoglycemia does concern me for underlying infection however after observation it does not appear that he has any a infection at this time      History of Present Illness   Physician Requesting Consult: Jenni Welch MD  Reason for Consult / Principal Problem:  Recurrent pneumonia    HPI: Shala Esparza is a 47y o  year old male who presents with hypoglycemia  Elizabeth Hicks took his normal amount of insulin yesterday morning and throughout the day had trouble keeping his blood sugars above 60  He drank almost 3 Cokes in addition to his normal meals yesterday in attempt to get his blood sugars at  He finally made his way to the emergency room because hypoglycemia and was admitted for incidental finding of right lower lobe pneumonia  That being said he had no symptoms of pneumonia, no cough no shortness of breaths no fever chills  He does have a history of recent pneumonia which was completely resolved on radiograph only 2 weeks ago    He denies any other symptoms of infection wounds dysuria  He is a dialysis patient and had no trouble with dialysis over the past week  Of note patient denies any smoking, vaping or any other inhaled irritants  Inpatient consult to Pulmonology  Consult performed by: Ulises Toney DO  Consult ordered by: Magi Giron MD          Review of Systems   Constitutional: Negative  HENT: Negative  Eyes: Negative  Respiratory: Negative for shortness of breath  Cardiovascular: Negative  Gastrointestinal: Negative  Endocrine: Negative  Genitourinary: Negative  Allergic/Immunologic: Negative  Neurological: Negative  Hematological: Negative  Psychiatric/Behavioral: Negative          Historical Information   Past Medical History:   Diagnosis Date    Bacteremia 12/21/2018    CAD (coronary artery disease)     s/p MARC to LCx, pLAD 2/2018    Cardiac arrest (HCC)     Chronic kidney disease     [facility] mellitus type 1 ([facility] [address] )     Dialysis patient [facility])     Access in right chest    GI bleed     Hyperlipidemia     Hypertension     Infection at site of external fixator pin ([facility] [address] )     MI (myocardial infarction) ([facility] [address] )     Pneumonia     Last Assessed [id number]    Renal failure     Renal transplant, status post 07/21/2007     Past Surgical History:   Procedure Laterality Date    AMPUTATION Right 02/2019    aboce knee    ANKLE FRACTURE SURGERY      ANKLE HARDWARE REMOVAL Right 7/31/2017    Procedure: REMOVAL HARDWARE ANKLE;  Surgeon: Mike Escobar MD;  [facility]: [facility];  Service: Orthopedics    ANKLE HARDWARE REMOVAL Right 8/17/2017    Procedure: TIBIA FAILED HARDWARE REMOVAL;  Surgeon: Mike Escobar MD;  [facility]: [facility];  Service: Orthopedics    CLOSED REDUCTION ANKLE Right 7/3/2017    Procedure: CLOSED REDUCTION DISTAL TIB-FIB AND CASTING VS;  Surgeon: Mike Escobar MD;  [facility]: [facility];  Service: Orthopedics    CORONARY ANGIOPLASTY WITH STENT PLACEMENT  02/2018    CAD s/p MARC to LCx, pLAD    ESOPHAGOGASTRODUODENOSCOPY N/A 12/20/2018    Procedure: ESOPHAGOGASTRODUODENOSCOPY (EGD) in ICU; Surgeon: Tony Aviles MD;  [facility]: [facility]; Service: Gastroenterology    EYE SURGERY      FRACTURE SURGERY      ORIF Rt Ankle    GLUTAMIC ACID DECARBOXYLASE (HISTORICAL)      IR AV FISTULAGRAM/GRAFTOGRAM  4/16/2020    IR PICC LINE  8/20/2018    IR TUNNELED DIALYSIS CATHETER CHECK/CHANGE/REPOSITION/ANGIOPLASTY  1/8/2020    IR TUNNELED DIALYSIS CATHETER PLACEMENT  10/21/2019    IR TUNNELED DIALYSIS CATHETER REMOVAL  5/29/2020    IR VENOUS LINE REMOVAL  10/5/2018    LEG AMPUTATION Right 02/01/2019    Above the knee    NEPHRECTOMY TRANSPLANTED ORGAN      VT ANASTOMOSIS,AV,ANY SITE Right 2/11/2020    Procedure: CREATION FISTULA ARTERIOVENOUS (AV) right upper extremity;  Surgeon: Anish Lott MD;  Location: [facility] MAIN OR;  Service: Vascular    VT OPEN TREATMENT FRACTURE DISTAL TIBIA FIBULA Right 7/3/2017    Procedure: OPEN REDUCTION W/ INTERNAL FIXATION (ORIF); Surgeon: Alyson Bangura MD;  [facility];  Service: Orthopedics    TOE AMPUTATION Right 10/27/2016    Procedure: AMPUTATION TOE;  Surgeon: Vj Blanc DPM;  [facility];  Service:      Social History   Social History     Substance and Sexual Activity   Alcohol Use Not Currently     Social History     Substance and Sexual Activity   Drug Use Never     E-Cigarette/Vaping    E-Cigarette Use Never User      E-Cigarette/Vaping Substances    Nicotine No     THC No     CBD No     Flavoring No     Other No     Unknown No      Social History     Tobacco Use   Smoking Status Never Smoker   Smokeless Tobacco Never Used     Occupational History:      Family History: non-contributory    Meds/Allergies   all current active meds have been reviewed    No Known Allergies    Objective   Vitals: Blood pressure 134/62, pulse 75, temperature 98 1 °F (36 7 °C), temperature source Oral, resp   rate 18, weight 59 kg (130 lb 1 1 oz), SpO2 95 %  ,Body mass index is 22 33 kg/m²  Intake/Output Summary (Last 24 hours) at 9/17/2020 1420  Last data filed at 9/17/2020 1300  Gross per 24 hour   Intake 990 ml   Output    Net 990 ml     Invasive Devices     Peripheral Intravenous Line            Peripheral IV 09/16/20 Left Antecubital less than 1 day          Line            Hemodialysis AV Fistula Right Upper arm -- days                Physical Exam  Constitutional:       Appearance: He is well-developed  HENT:      Head: Normocephalic and atraumatic  Eyes:      Pupils: Pupils are equal, round, and reactive to light  Neck:      Musculoskeletal: Normal range of motion and neck supple  Cardiovascular:      Rate and Rhythm: Normal rate and regular rhythm  Heart sounds: No murmur  Pulmonary:      Effort: Pulmonary effort is normal  No respiratory distress  Breath sounds: Normal breath sounds  No wheezing or rales  Abdominal:      Palpations: Abdomen is soft  Musculoskeletal:      Comments: Right AKA   Skin:     General: Skin is warm and dry  Neurological:      Mental Status: He is alert and oriented to person, place, and time           Lab Results:   BNP: No results found for: BNP, CBC:   Lab Results   Component Value Date    WBC 5 92 09/17/2020    HGB 8 6 (L) 09/17/2020    HCT 26 2 (L) 09/17/2020    MCV 95 09/17/2020     09/17/2020    MCH 31 0 09/17/2020    MCHC 32 8 09/17/2020    RDW 13 6 09/17/2020    MPV 8 4 (L) 09/17/2020    NRBC 0 09/16/2020   , CMP:   Lab Results   Component Value Date    SODIUM 127 (L) 09/17/2020    K 4 2 09/17/2020    CL 93 (L) 09/17/2020    CO2 22 09/17/2020    BUN 41 (H) 09/17/2020    CREATININE 5 81 (H) 09/17/2020    CALCIUM 8 1 (L) 09/17/2020    AST 17 09/16/2020    ALT 19 09/16/2020    ALKPHOS 167 (H) 09/16/2020    EGFR 10 09/17/2020     Imaging Studies: I have personally reviewed pertinent films in PACS  EKG, Pathology, and Other Studies: I have personally reviewed pertinent films in PACS  VTE Prophylaxis: Heparin    Code Status: Level 1 - Full Code  Advance Directive and Living Will:      Power of :    POLST:      None

## 2020-09-17 NOTE — ED NOTES
IV site assessed  Antibiotics running at this time  Right limb restriction due to dialysis fistula        Ray Du RN  09/16/20 6975

## 2020-09-17 NOTE — ASSESSMENT & PLAN NOTE
Lab Results   Component Value Date    HGBA1C 7 4 (H) 09/17/2020       Recent Labs     09/17/20  0127 09/17/20  0328 09/17/20  0526 09/17/20  0728   POCGLU 205* 299* 286* 319*       Blood Sugar Average: Last 72 hrs:  (P) 222 [id number]     Patient's fasting blood glucose is greater than 300 this morning  Resume Levemir  Insulin sliding scale with Accu-Cheks AC and HS  Continue hypoglycemic protocol

## 2020-09-17 NOTE — ASSESSMENT & PLAN NOTE
Patient requires hemodialysis Tuesday Thursday Saturday  Nephrology consulted  Will need dialysis tomorrow Thursday

## 2020-09-17 NOTE — UTILIZATION REVIEW
Notification of Inpatient Admission/Inpatient Authorization Request   This is a Notification of Inpatient Admission for [facility] [address]  Be advised that this patient was admitted to our facility under Inpatient Status  Contact Rio Henry at [phone] for additional admission information  [facility] DEPT  DEDICATED [phone]  Patient Name:   Baldev Kline   YOB: 1970       [address]   [address]:   [address]  Tax ID: [id number]  NPI: [id number] Attending Provider/NPI:  Phone:  Address: Tiffany Lancaster [[id number]]  [phone]  Same as BILLIE/Dino Rolle [address] of Service Code: 24 Place of Service Name:  [address] [place] [address]   Start Date: 9/16/20 2231 Discharge Date & Time: No discharge date for patient encounter  Type of Admission: Inpatient Status Discharge Disposition   (if discharged): Left against medical advice or discontinued care   Patient Diagnoses: Pneumonia [J18 9]  Low blood sugar [E16 2]  Hypoxia [R09 02]  ESRD (end stage renal disease) on dialysis ([facility] [address] ) [N18 6, Z99 2]  Hypoglycemia due to insulin [E16 0, T38 3X5A]     Orders: Admission Orders (From admission, onward)     Ordered        09/16/20 2231  Inpatient Admission (expected length of stay for this patient Order details is greater than two midnights)  Once                    Assigned Utilization Review Contact: Rio Henry  Utilization   Network Utilization Review Department  Phone: [phone]; Fax [phone]  Email: [email or web address]   ATTENTION PAYERS: Please call the assigned Utilization  directly with any questions or concerns ALL voicemails in the department are confidential  Send all requests for admission clinical reviews, approved or denied determinations and any other requests to dedicated fax number belonging to the campus where the patient is receiving treatment

## 2020-09-17 NOTE — ASSESSMENT & PLAN NOTE
Likely due to pulmonary vascular congestion and right sided pneumonia  Will consult pulmonary medicine given recurrence of right sided pneumonia

## 2020-09-17 NOTE — ASSESSMENT & PLAN NOTE
Lab Results   Component Value Date    HGBA1C 4 8 10/11/2019       Recent Labs     09/16/20 1955 09/16/20 2054   POCGLU 61* 199*       Blood Sugar Average: Last 72 hrs:  (P) 130     Patient presented to the emergency room hypoglycemic with a blood glucose of 61  Patient was given an amp of D50 in the emergency room current blood glucose is 199  Will continue long-acting insulin starting tomorrow  Accu-Cheks Q 2 hours until specified  Half a amp dextrose 50 p r n   Ordered  Collect new A1c  Insulin sliding scale

## 2020-09-17 NOTE — H&P
H&P- Shala Esparza 1970, 47 y o  male MRN: [id number]    Unit/Bed#: 417-01 Encounter: [id number]    Primary Care Provider: Bhumi Luo DO   Date and time admitted to hospital: 9/16/2020  7:50 PM        ESRD (end stage renal disease) on dialysis [facility])  Assessment & Plan  Patient requires hemodialysis Tuesday Thursday Saturday  Nephrology consulted  Will need dialysis tomorrow Thursday    * Acute on chronic respiratory failure with hypoxia [facility])  Assessment & Plan  Acute on chronic respiratory failure with hypoxia as evidence by SpO2 of 89% on room air at rest   Awaiting official radiological read on chest x-ray-my interpretation is right lower lobe pneumonia and moderate vascular congestion  Please see additional treatment plan for healthcare associated pneumonia and end-stage renal disease     HCAP (healthcare-associated pneumonia)  Assessment & Plan  Recent hospitalization for right lower lobe pneumonia CAP  Images from today highly suspicious of recurrence of right lower lobe pneumonia  Cefepime and vancomycin ordered-renally dosed  Urine antigens ordered  Influenza A/B ordered  Covid ordered  Blood cultures in process  Serial procalcitonin  Respiratory protocol in place  Admit to med surge  Monitor per protocol    Immunosuppression [facility])  Assessment & Plan  Status post renal transplant taking Prograf and prednisone daily-continue    Type 1 diabetes mellitus [facility])  Assessment & Plan  Lab Results   Component Value Date    HGBA1C 4 8 10/11/2019       Recent Labs     09/16/20 1955 09/16/20 2054   POCGLU 61* 199*       Blood Sugar Average: Last 72 hrs:  (P) 130     Patient presented to the emergency room hypoglycemic with a blood glucose of 61  Patient was given an amp of D50 in the emergency room current blood glucose is 199  Will continue long-acting insulin starting tomorrow  Accu-Cheks Q 2 hours until specified  Half a amp dextrose 50 p r n   Ordered  Collect new A1c  Insulin sliding scale    Hyponatremia  Assessment & Plan  As evidence by a sodium level of 130 on admission  Most likely secondary to hypovolemia patient to receive hemodialysis tomorrow    Chronic anemia  Assessment & Plan  As evidence by hemoglobin level of 10 7 probably most likely be secondary to chronic disease including end-stage renal disease  No signs and symptoms of  bleeding    Hypertension  Assessment & Plan  Blood pressure currently stable  Admit to med surge  Monitor per protocol  Continue prior to admission medication        VTE Prophylaxis: Heparin  / sequential compression device   Code Status: FULL  POLST: POLST is not applicable to this patient    Anticipated Length of Stay:  Patient will be admitted on an Inpatient basis with an anticipated length of stay of  Greater than 2 midnights  Justification for Hospital Stay:  Hypoglycemia, end-stage renal disease requiring dialysis, Healthcare associated pneumonia, acute respiratory failure with hypoxia    Total Time for Visit, including Counseling / Coordination of Care: 45 minutes  Greater than 50% of this total time spent on direct patient counseling and coordination of care  Chief Complaint:  Reports low blood glucose reading at home    History of Present Illness:    Baron Hernandez is a 47 y o  male  with a past medical history including diabetes type 1, end-stage renal disease requiring hemodialysis, renal transplant, immunosuppression, hypertension, hyperlipidemia, coronary artery disease, MI, presented to the emergency room with a chief complaint of low blood glucose that has been persistent most of the day  On any and triaged in the emergency room patient was noted to be hypoxic 89% at rest   Patient does not utilize home O2 supplementation  Chart review indicates recent hospitalization for CAP follow-up images were collected and images showed improvement of the pneumonia    Images and labs were collected today right lower lobe highly suggestive of recurrent pneumonia  Patient denies lightheadedness dizziness denies chest pain denies shortness of breath denies abdominal pain nausea vomiting diarrhea denies dysuria urgency or frequency  States he checks felt like his blood sugar was low patient be [facility] to [facility], significant findings of acute respiratory failure with hypoxia, HCAP, vascular congestion, hypervolemia, hypoglycemia  Review of Systems:    Review of Systems   All other systems reviewed and are negative  Past Medical and Surgical History:     Past Medical History:   Diagnosis Date    Bacteremia 12/21/2018    CAD (coronary artery disease)     s/p MARC to LCx, pLAD 2/2018    Cardiac arrest (HCC)     Chronic kidney disease     Diabetes mellitus type 1 ([facility] [address] )     Dialysis patient [facility])     Access in right chest    GI bleed     Hyperlipidemia     Hypertension     Infection at site of external fixator pin ([facility] )     MI (myocardial infarction) ([facility] )     Pneumonia     Last Assessed [id number]    Renal failure     Renal transplant, status post 07/21/2007       Past Surgical History:   Procedure Laterality Date    AMPUTATION Right 02/2019    aboce knee    ANKLE FRACTURE SURGERY      ANKLE HARDWARE REMOVAL Right 7/31/2017    Procedure: REMOVAL HARDWARE ANKLE;  Surgeon: Alyson Bangura MD;  Location: [facility];  Service: Orthopedics    ANKLE HARDWARE REMOVAL Right 8/17/2017    Procedure: TIBIA FAILED HARDWARE REMOVAL;  Surgeon: Alyson Bangura MD;  Location: [facility];  Service: Orthopedics    CLOSED REDUCTION ANKLE Right 7/3/2017    Procedure: CLOSED REDUCTION DISTAL TIB-FIB AND CASTING VS;  Surgeon: Alyson Bangura MD;  Location: [facility];  Service: Orthopedics    CORONARY ANGIOPLASTY WITH STENT PLACEMENT  02/2018    CAD s/p MARC to LCx, pLAD    ESOPHAGOGASTRODUODENOSCOPY N/A 12/20/2018    Procedure: ESOPHAGOGASTRODUODENOSCOPY (EGD) in ICU; Surgeon: Tony Aviles MD;  Location: [facility];   Service: Gastroenterology    EYE SURGERY      FRACTURE SURGERY      ORIF Rt Ankle    GLUTAMIC ACID DECARBOXYLASE (HISTORICAL)      IR AV FISTULAGRAM/GRAFTOGRAM  4/16/2020    IR PICC LINE  8/20/2018    IR TUNNELED DIALYSIS CATHETER CHECK/CHANGE/REPOSITION/ANGIOPLASTY  1/8/2020    IR TUNNELED DIALYSIS CATHETER PLACEMENT  10/21/2019    IR TUNNELED DIALYSIS CATHETER REMOVAL  5/29/2020    IR VENOUS LINE REMOVAL  10/5/2018    LEG AMPUTATION Right 02/01/2019    Above the knee    NEPHRECTOMY TRANSPLANTED ORGAN      NC ANASTOMOSIS,AV,ANY SITE Right 2/11/2020    Procedure: CREATION FISTULA ARTERIOVENOUS (AV) right upper extremity;  Surgeon: Farzad Smith MD;  Location: [address] [place] [address] MAIN OR;  Service: Vascular    NC OPEN TREATMENT FRACTURE DISTAL TIBIA FIBULA Right 7/3/2017    Procedure: OPEN REDUCTION W/ INTERNAL FIXATION (ORIF); Surgeon: Mike Escobar MD;  Location: [facility];  Service: Orthopedics    TOE AMPUTATION Right 10/27/2016    Procedure: AMPUTATION TOE;  Surgeon: Sai Lambert DPM;  Location: [facility];  Service:        Meds/Allergies:    Prior to Admission medications    Medication Sig Start Date End Date Taking?  Authorizing Provider   aspirin 81 mg chewable tablet Chew 81 mg every morning    Yes Historical Provider, MD   atorvastatin (LIPITOR) 80 mg tablet Take 80 mg by mouth every morning    Yes Historical Provider, MD   azaTHIOprine (IMURAN) 50 mg tablet Take 1 tablet (50 mg total) by mouth every morning 8/18/20  Yes Kassidy Sims, DO   calcium carbonate (TUMS) 500 mg chewable tablet Chew 500 mg 2 (two) times a day    Yes Historical Provider, MD   carvedilol (COREG) 6 25 mg tablet Take 1 tablet (6 25 mg total) by mouth 2 (two) times a day with meals 8/18/20  Yes Kassidy Sims, DO   insulin lispro (HumaLOG) 100 units/mL injection Inject 10 Units under the skin as needed for high blood sugar 9/10/20  Yes GAVINO Gutierrez   isosorbide mononitrate (IMDUR) 30 mg 24 hr tablet Take 1 tablet (30 mg total) by mouth daily 9/1/20  Yes Debra Hearn PA-C [facility] ([address]) [address] [facility]  11/19/19  Yes Historical Provider, MD   metoprolol succinate (TOPROL-XL) 50 mg 24 hr tablet Take 1 tablet (50 mg total) by mouth every morning 6/18/20  Yes Mariella Rashel, DO   NIFEdipine (PROCARDIA XL) 90 mg 24 hr tablet Take 1 tablet (90 mg total) by mouth daily 8/18/20  Yes Mariella Rashel, DO   ONE TOUCH ULTRA TEST test strip CHECK BLOOD SUGAR 5 TO 6 TIMES DAILY 11/19/19  Yes Mariella Rashel, DO   pantoprazole (PROTONIX) 40 mg tablet Take 1 tablet (40 mg total) by mouth daily 8/18/20  Yes Mariella Rashel, DO   predniSONE 5 mg tablet Take 1 tablet (5 mg total) by mouth daily 4/14/20  Yes Mariella Rashel, DO   tacrolimus (PROGRAF) 1 mg capsule Take 1 capsule (1 mg total) by mouth 3 (three) times a day 0800, 1400, 2000 9/10/20  Yes GAVINO Bingham   albuterol (2 5 mg/3 mL) 0 083 % nebulizer solution Inhale 2 5 mg every 4 (four) hours as needed  2/8/19   Historical Provider, MD   Cholecalciferol 25 MCG (1000 UT) tablet Take 1 tablet (1,000 Units total) by mouth daily  Patient not taking: Reported on 9/16/2020 11/19/19   Mariella Rashel, DO   glucagon (GLUCAGON EMERGENCY) 1 MG injection Inject 1 mg under the skin once as needed for low blood sugar for up to 1 dose 10/18/19   Mariella Rashel, DO   insulin detemir (LEVEMIR) 100 units/mL subcutaneous injection Inject 8 Units under the skin 2 (two) times a day 9/10/20   GAVINO Bingham   ondansetron (ZOFRAN-ODT) 4 mg disintegrating tablet Take 1 tablet (4 mg total) by mouth every 6 (six) hours as needed for vomiting 6/9/20   Timmothy Grime, DO     I have reviewed home medications using allscripts      Allergies: No Known Allergies    Social History:     Marital Status: /Civil Union   Occupation:  Noncontributory    Substance Use History:   Social History     Substance and Sexual Activity   Alcohol Use Not Currently     Social History     Tobacco Use   Smoking Status Never Smoker   Smokeless Tobacco Never Used     Social History Substance and Sexual Activity   Drug Use Never       Family History:    Family History   Problem Relation Age of Onset    Diabetes Brother     Coronary artery disease Mother     No Known Problems Father        Physical Exam:     Vitals:   Blood Pressure: 156/71 (09/16/20 2200)  Pulse: 68 (09/16/20 2200)  Temperature: 97 8 °F (36 6 °C) (09/16/20 1955)  Temp Source: Temporal (09/16/20 1955)  Respirations: 16 (09/16/20 2200)  Weight - Scale: 59 kg (130 lb 1 1 oz) (09/16/20 1955)  SpO2: 98 % (09/16/20 2200)    Physical Exam  Constitutional:       Appearance: Normal appearance  He is ill-appearing  Interventions: Nasal cannula in place  Comments: 2 l NC   Eyes:      Extraocular Movements: Extraocular movements intact  Pupils: Pupils are equal, round, and reactive to light  Neck:      Musculoskeletal: Normal range of motion and neck supple  Cardiovascular:      Rate and Rhythm: Normal rate and regular rhythm  Pulmonary:      Effort: Pulmonary effort is normal       Breath sounds: Normal breath sounds  Abdominal:      General: Abdomen is flat  Bowel sounds are normal       Palpations: Abdomen is soft  Musculoskeletal:         General: No swelling or tenderness  Skin:     General: Skin is warm and dry  Capillary Refill: Capillary refill takes 2 to 3 seconds  Neurological:      General: No focal deficit present  Mental Status: He is alert and oriented to person, place, and time  GCS: GCS eye subscore is 4  GCS verbal subscore is 5  GCS motor subscore is 6  Psychiatric:         Mood and Affect: Mood normal          Behavior: Behavior normal          Thought Content: Thought content normal          Judgment: Judgment normal          Additional Data:     Lab Results: I have personally reviewed pertinent reports        Results from last 7 days   Lab Units 09/16/20 2028   WBC Thousand/uL 7 47   HEMOGLOBIN g/dL 10 7*   HEMATOCRIT % 32 7*   PLATELETS Thousands/uL 253   NEUTROS PCT % 63   LYMPHS PCT % 24   MONOS PCT % 9   EOS PCT % 3     Results from last 7 days   Lab Units 09/16/20 2028   POTASSIUM mmol/L 4 2   CHLORIDE mmol/L 93*   CO2 mmol/L 25   BUN mg/dL 38*   CREATININE mg/dL 5 12*   CALCIUM mg/dL 8 5   ALK PHOS U/L 167*   ALT U/L 19   AST U/L 17           Imaging: I have personally reviewed pertinent reports  X-ray Chest 1 View Portable    Result Date: 8/25/2020  Narrative: CHEST INDICATION:   Shortness of breath  Patient has suspected COVID-19  COMPARISON:  12/1/2019 and 6/9/2020  EXAM PERFORMED/VIEWS:  XR CHEST PORTABLE FINDINGS: Dense consolidation at the right lower lung field  Lack of silhouetting of the cardiac border implies lower lobe location  Patchy air opacities demonstrated peripherally in the left lateral lower lung field  Effusion on either side  No pneumothorax  Heart is mildly enlarged but unchanged  Pulmonary vasculature is partially obscured  Question mild cephalization  Bones are unremarkable  Impression: 1  Dense right lower lobe pneumonia  Patchy opacities in the left could represent other foci of infection  2   Question background pulmonary vascular congestion though focal distribution of consolidations speaks against alveolar edema  Note: I agree with the preliminary interpretation by the ED care provider documented in Epic  Workstation performed: [id number]     Xr Chest Pa & Lateral    Result Date: 9/4/2020  Narrative: CHEST INDICATION:   J18 9: Pneumonia, unspecified organism  Follow-up pneumonia  COMPARISON:  Chest radiograph from 8/25/2020  EXAM PERFORMED/VIEWS:  XR CHEST PA & LATERAL FINDINGS: Cardiomediastinal silhouette appears unremarkable  Cardiac stents  Resolution of right base pneumonia  No effusion or pneumothorax  Osseous structures appear within normal limits for patient age  Impression: No acute cardiopulmonary disease  Resolution of right base pneumonia   Workstation performed: [id number]       [facility] Records Reviewed: Yes     ** Please Note: This note has been constructed using a voice recognition system   **

## 2020-09-17 NOTE — ASSESSMENT & PLAN NOTE
Recent hospitalization for right lower lobe pneumonia CAP  Images from today highly suspicious of recurrence of right lower lobe pneumonia  Cefepime and vancomycin ordered-renally dosed  Urine antigens ordered  Influenza A/B ordered  Covid ordered  Blood cultures in process  Serial procalcitonin  Respiratory protocol in place  Admit to med surge  Monitor per protocol

## 2020-09-17 NOTE — ED NOTES
Pt placed on 2L O2 via N/C for O2 sat of 88% on room air  Pt denies any SOB but states he was recently admitted for pulmonary edema  POCT glucose 61       Martha Ramires RN  09/16/20 2002

## 2020-09-17 NOTE — PLAN OF CARE
Problem: Potential for Falls  Goal: Patient will remain free of falls  Description: INTERVENTIONS:  - Assess patient frequently for physical needs  -  Identify physical deficits and behaviors that affect risk of falls  -  [place] fall precautions as indicated by assessment   - Educate patient/family on patient safety including physical limitations  - Instruct patient to call for assistance with activity based on assessment  - Modify environment to reduce risk of injury  - Consider OT/PT consult to assist with strengthening/mobility  Outcome: Progressing     Problem: PAIN - ADULT  Goal: Verbalizes/displays adequate comfort level or baseline comfort level  Description: Interventions:  - Encourage patient to monitor pain and request assistance  - Assess pain using appropriate pain scale  - Administer analgesics based on type and severity of pain and evaluate response  - Implement non-pharmacological measures as appropriate and evaluate response  - Consider cultural and social influences on pain and pain management  - Notify physician/advanced practitioner if interventions unsuccessful or patient reports new pain  Outcome: Progressing     Problem: INFECTION - ADULT  Goal: Absence or prevention of progression during hospitalization  Description: INTERVENTIONS:  - Assess and monitor for signs and symptoms of infection  - Monitor lab/diagnostic results  - Monitor all insertion sites, i e  indwelling lines  - [place] appropriate cooling/warming therapies per order  - Administer medications as ordered  - Instruct and encourage patient and family to use good hand hygiene technique  - Identify and instruct in appropriate isolation precautions for identified infection/condition  Outcome: Progressing     Problem: SAFETY ADULT  Goal: Patient will remain free of falls  Description: INTERVENTIONS:  - Assess patient frequently for physical needs  -  Identify physical deficits and behaviors that affect risk of falls    - [place] fall precautions as indicated by assessment   - Educate patient/family on patient safety including physical limitations  - Instruct patient to call for assistance with activity based on assessment  - Modify environment to reduce risk of injury  - Consider OT/PT consult to assist with strengthening/mobility  Outcome: Progressing  Goal: Maintain or return to baseline ADL function  Description: INTERVENTIONS:  -  Assess patient's ability to carry out ADLs; assess patient's baseline for ADL function and identify physical deficits which impact ability to perform ADLs (bathing, care of mouth/teeth, toileting, grooming, dressing, etc )  - Assess/evaluate cause of self-care deficits   - Assess range of motion  - Assess patient's mobility; develop plan if impaired  - Assess patient's need for assistive devices and provide as appropriate  - Encourage maximum independence but intervene and supervise when necessary  - Involve family in performance of ADLs  - Assess for home care needs following discharge   - Consider OT consult to assist with ADL evaluation and planning for discharge  - Provide patient education as appropriate  Outcome: Progressing  Goal: Maintain or return mobility status to optimal level  Description: INTERVENTIONS:  - Assess patient's baseline mobility status (ambulation, transfers, stairs, etc )    - Identify physical deficits and behaviors that affect mobility  - Identify mobility aids required to assist with transfers and/or ambulation (gait belt, sit-to-stand, lift, walker, cane, etc )  - [place] fall precautions as indicated by assessment  - Record patient progress and toleration of activity level on Mobility SBAR; progress patient to next Phase/Stage  - Instruct patient to call for assistance with activity based on assessment  - Consider rehabilitation consult to assist with strengthening/weightbearing, etc   Outcome: Progressing     Problem: DISCHARGE PLANNING  Goal: Discharge to home or other facility with appropriate resources  Description: INTERVENTIONS:  - Identify barriers to discharge w/patient   - Arrange for needed discharge resources and transportation as appropriate  - Identify discharge learning needs (meds, wound care, etc )  - Refer to Case Management Department for coordinating discharge planning if the patient needs post-hospital services based on physician/advanced practitioner order or complex needs related to functional status, or social support system  Outcome: Progressing     Problem: Knowledge Deficit  Goal: Patient/family/caregiver demonstrates understanding of disease process, treatment plan, medications, and discharge instructions  Description: Complete learning assessment and assess knowledge base    Interventions:  - Provide teaching at level of understanding  - Provide teaching via preferred learning methods  Outcome: Progressing     Problem: METABOLIC, FLUID AND ELECTROLYTES - ADULT  Goal: Glucose maintained within target range  Description: INTERVENTIONS:  - Monitor Blood Glucose as ordered  - Assess for signs and symptoms of hyperglycemia and hypoglycemia  - Administer ordered medications to maintain glucose within target range  - Assess nutritional intake and initiate nutrition service referral as needed  Outcome: Progressing     Problem: SKIN/TISSUE INTEGRITY - ADULT  Goal: Skin integrity remains intact  Description: INTERVENTIONS  - Identify patients at risk for skin breakdown  - Assess and monitor skin integrity  - Assess and monitor nutrition and hydration status  - Monitor labs (i e  albumin)  - Assist with mobility/ambulation  - Provide appropriate hygiene as needed including keeping skin clean and dry  - Evaluate need for skin moisturizer/barrier cream  - Collaborate with interdisciplinary team (i e  Nutrition, Rehabilitation, etc )   - Patient/family teaching  Outcome: Progressing     Problem: MUSCULOSKELETAL - ADULT  Goal: Maintain or return mobility to safest level of function  Description: INTERVENTIONS:  - Assess patient's ability to carry out ADLs; assess patient's baseline for ADL function and identify physical deficits which impact ability to perform ADLs (bathing, care of mouth/teeth, toileting, grooming, dressing, etc )  - Assess/evaluate cause of self-care deficits   - Assess range of motion  - Assess patient's mobility  - Assess patient's need for assistive devices and provide as appropriate  - Encourage maximum independence but intervene and supervise when necessary  - Involve family in performance of ADLs  - Assess for home care needs following discharge   - Consider OT consult to assist with ADL evaluation and planning for discharge  - Provide patient education as appropriate  Outcome: Progressing  Goal: Maintain proper alignment of affected body part  Description: INTERVENTIONS:  - Support, maintain and protect limb and body alignment  - Provide patient/ family with appropriate education  Outcome: Progressing

## 2020-09-17 NOTE — PLAN OF CARE
Addendum  CPAP DOWNLOAD DATA  % use > 4 hours 80%  Residual AHI 4  Mean Usage (time in hours) 5 hours and 3 min    No changes at this time.   Problem: Potential for Falls  Goal: Patient will remain free of falls  Description: INTERVENTIONS:  - Assess patient frequently for physical needs  -  Identify physical deficits and behaviors that affect risk of falls  -  [place] fall precautions as indicated by assessment   - Educate patient/family on patient safety including physical limitations  - Instruct patient to call for assistance with activity based on assessment  - Modify environment to reduce risk of injury  - Consider OT/PT consult to assist with strengthening/mobility  Outcome: Progressing     Problem: PAIN - ADULT  Goal: Verbalizes/displays adequate comfort level or baseline comfort level  Description: Interventions:  - Encourage patient to monitor pain and request assistance  - Assess pain using appropriate pain scale  - Administer analgesics based on type and severity of pain and evaluate response  - Implement non-pharmacological measures as appropriate and evaluate response  - Consider cultural and social influences on pain and pain management  - Notify physician/advanced practitioner if interventions unsuccessful or patient reports new pain  Outcome: Progressing     Problem: INFECTION - ADULT  Goal: Absence or prevention of progression during hospitalization  Description: INTERVENTIONS:  - Assess and monitor for signs and symptoms of infection  - Monitor lab/diagnostic results  - Monitor all insertion sites, i e  indwelling lines  - [place] appropriate cooling/warming therapies per order  - Administer medications as ordered  - Instruct and encourage patient and family to use good hand hygiene technique  - Identify and instruct in appropriate isolation precautions for identified infection/condition  Outcome: Progressing     Problem: SAFETY ADULT  Goal: Patient will remain free of falls  Description: INTERVENTIONS:  - Assess patient frequently for physical needs  -  Identify physical deficits and behaviors that affect risk of falls    - [place] fall precautions as indicated by assessment   - Educate patient/family on patient safety including physical limitations  - Instruct patient to call for assistance with activity based on assessment  - Modify environment to reduce risk of injury  - Consider OT/PT consult to assist with strengthening/mobility  Outcome: Progressing  Goal: Maintain or return to baseline ADL function  Description: INTERVENTIONS:  -  Assess patient's ability to carry out ADLs; assess patient's baseline for ADL function and identify physical deficits which impact ability to perform ADLs (bathing, care of mouth/teeth, toileting, grooming, dressing, etc )  - Assess/evaluate cause of self-care deficits   - Assess range of motion  - Assess patient's mobility; develop plan if impaired  - Assess patient's need for assistive devices and provide as appropriate  - Encourage maximum independence but intervene and supervise when necessary  - Involve family in performance of ADLs  - Assess for home care needs following discharge   - Consider OT consult to assist with ADL evaluation and planning for discharge  - Provide patient education as appropriate  Outcome: Progressing  Goal: Maintain or return mobility status to optimal level  Description: INTERVENTIONS:  - Assess patient's baseline mobility status (ambulation, transfers, stairs, etc )    - Identify physical deficits and behaviors that affect mobility  - Identify mobility aids required to assist with transfers and/or ambulation (gait belt, sit-to-stand, lift, walker, cane, etc )  - [place] fall precautions as indicated by assessment  - Record patient progress and toleration of activity level on Mobility SBAR; progress patient to next Phase/Stage  - Instruct patient to call for assistance with activity based on assessment  - Consider rehabilitation consult to assist with strengthening/weightbearing, etc   Outcome: Progressing     Problem: DISCHARGE PLANNING  Goal: Discharge to home or other facility with appropriate resources  Description: INTERVENTIONS:  - Identify barriers to discharge w/patient   - Arrange for needed discharge resources and transportation as appropriate  - Identify discharge learning needs (meds, wound care, etc )  - Refer to Case Management Department for coordinating discharge planning if the patient needs post-hospital services based on physician/advanced practitioner order or complex needs related to functional status, or social support system  Outcome: Progressing     Problem: Knowledge Deficit  Goal: Patient/family/caregiver demonstrates understanding of disease process, treatment plan, medications, and discharge instructions  Description: Complete learning assessment and assess knowledge base    Interventions:  - Provide teaching at level of understanding  - Provide teaching via preferred learning methods  Outcome: Progressing     Problem: METABOLIC, FLUID AND ELECTROLYTES - ADULT  Goal: Glucose maintained within target range  Description: INTERVENTIONS:  - Monitor Blood Glucose as ordered  - Assess for signs and symptoms of hyperglycemia and hypoglycemia  - Administer ordered medications to maintain glucose within target range  - Assess nutritional intake and initiate nutrition service referral as needed  Outcome: Progressing     Problem: SKIN/TISSUE INTEGRITY - ADULT  Goal: Skin integrity remains intact  Description: INTERVENTIONS  - Identify patients at risk for skin breakdown  - Assess and monitor skin integrity  - Assess and monitor nutrition and hydration status  - Monitor labs (i e  albumin)  - Assist with mobility/ambulation  - Provide appropriate hygiene as needed including keeping skin clean and dry  - Evaluate need for skin moisturizer/barrier cream  - Collaborate with interdisciplinary team (i e  Nutrition, Rehabilitation, etc )   - Patient/family teaching  Outcome: Progressing     Problem: MUSCULOSKELETAL - ADULT  Goal: Maintain or return mobility to safest level of function  Description: INTERVENTIONS:  - Assess patient's ability to carry out ADLs; assess patient's baseline for ADL function and identify physical deficits which impact ability to perform ADLs (bathing, care of mouth/teeth, toileting, grooming, dressing, etc )  - Assess/evaluate cause of self-care deficits   - Assess range of motion  - Assess patient's mobility  - Assess patient's need for assistive devices and provide as appropriate  - Encourage maximum independence but intervene and supervise when necessary  - Involve family in performance of ADLs  - Assess for home care needs following discharge   - Consider OT consult to assist with ADL evaluation and planning for discharge  - Provide patient education as appropriate  Outcome: Progressing  Goal: Maintain proper alignment of affected body part  Description: INTERVENTIONS:  - Support, maintain and protect limb and body alignment  - Provide patient/ family with appropriate education  Outcome: Progressing

## 2020-09-17 NOTE — ASSESSMENT & PLAN NOTE
Recent hospitalization for right lower lobe pneumonia CAP  Day 2 vancomycin and cefepime  Follow-up urinary antigens  Follow-up blood cultures  Pulmonary consult given recurrence of right-sided pneumonia

## 2020-09-17 NOTE — RESPIRATORY THERAPY NOTE
RT Protocol Note  Baron Hernandez 47 y o  male MRN: [id number]  Unit/Bed#: 361-68 Encounter: [id number]    Assessment    Principal Problem:    Acute on chronic respiratory failure with hypoxia ([place] [address] )  Active Problems:    Type 1 diabetes mellitus (HCC)    Immunosuppression (HCC)    Hypertension    Hyponatremia    Chronic anemia    ESRD (end stage renal disease) on dialysis ([place] [address] )    HCAP (healthcare-associated pneumonia)      Home Pulmonary Medications:  Patient states he does have a nebulizer machine at home but hasnt had to use it in about a year   Patient denies oxygen and pap therapy       Past Medical History:   Diagnosis Date    Bacteremia 12/21/2018    CAD (coronary artery disease)     s/p MARC to LCx, pLAD 2/2018    Cardiac arrest ([place] [address] )     Chronic kidney disease     Diabetes mellitus type 1 ([place] [address] )     Dialysis patient ([place] [address] )     Access in right chest    GI bleed     Hyperlipidemia     Hypertension     Infection at site of external fixator pin ([place] [address] )     MI (myocardial infarction) ([place] [address] )     Pneumonia     Last Assessed [id number]    Renal failure     Renal transplant, status post 07/21/2007     Social History     Socioeconomic History    Marital status: /Civil Union     Spouse name: None    Number of children: None    Years of education: None    Highest education level: None   Occupational History    None   Social Needs    Financial resource strain: None    Food insecurity     Worry: None     Inability: None    Transportation needs     Medical: None     Non-medical: None   Tobacco Use    Smoking status: Never Smoker    Smokeless tobacco: Never Used   Substance and Sexual Activity    Alcohol use: Not Currently    Drug use: Never    Sexual activity: Yes     Partners: Female   Lifestyle    Physical activity     Days per week: None     Minutes per session: None    Stress: None   Relationships    Social connections     Talks on phone: None     Gets together: None     Attends Faith service: None     Active member of club or organization: None     Attends meetings of clubs or organizations: None     Relationship status: None    Intimate partner violence     Fear of current or ex partner: None     Emotionally abused: None     Physically abused: None     Forced sexual activity: None   Other Topics Concern    None   Social History Narrative    No living will       Subjective    Subjective Data: Patient states he is not short of breathe    Objective  PRN albuteral  Incentive spirometer to help promote bronchial hygiene Q1H    Physical Exam:   Assessment Type: During-treatment  General Appearance: Alert, Awake  Chest Assessment: Chest expansion symmetrical  Bilateral Breath Sounds: Diminished, Coarse  Cough: Non-productive  O2 Device: 2 lpm oxygen via nasal cannula    Vitals:  Blood pressure 128/63, pulse 74, temperature 98 4 °F (36 9 °C), resp  rate 18, weight 59 kg (130 lb 1 1 oz), SpO2 98 %  Imaging and other studies: I have personally reviewed pertinent reports  O2 Device: 2 lpm oxygen via nasal cannula     Plan    Respiratory Plan: Home Bronchodilator Patient pathway  Airway Clearance Plan: Incentive Spirometer     Resp Comments: Patient received in his room  Patient stated he does have a nebulizer machine at home but hasnt had to use it in about a year  Patient denies oxygen and pap therapy  Will make patient prn Aluberal  Incentive spirometer to help promote bronchial hygiene Q1H   Instruction and education given to patient, good return demonstration

## 2020-09-17 NOTE — PROGRESS NOTES
Vancomycin Assessment    Berto Martinez is a 47 y o  male who is currently ordered vancomycin 100mg IV q12h for Pneumonia  Relevant clinical data and objective history reviewed:  Creatinine   Date Value Ref Range Status   09/16/2020 5 12 (H) 0 60 - 1 30 mg/dL Final     Comment:     Standardized to IDMS reference method   08/27/2020 7 31 (H) 0 60 - 1 30 mg/dL Final     Comment:     Standardized to IDMS reference method   08/26/2020 5 72 (H) 0 60 - 1 30 mg/dL Final     Comment:     Standardized to IDMS reference method   11/06/2015 1 26 0 60 - 1 30 mg/dL Final     Comment:     Standardized to IDMS reference method   06/05/2015 1 07 0 60 - 1 30 mg/dL Final     Comment:     Standardized to IDMS reference method   12/14/2014 1 13 0 60 - 1 30 mg/dL Final     Comment:     Standardized to IDMS reference method     Vancomycin Rm   Date Value Ref Range Status   10/23/2019 24 1 ug/mL Final     /63   Pulse 71   Temp 98 4 °F (36 9 °C)   Resp 18   Wt 59 kg (130 lb 1 1 oz)   SpO2 95%   BMI 22 33 kg/m²   No intake/output data recorded  Lab Results   Component Value Date/Time    BUN 38 (H) 09/16/2020 08:28 PM    BUN 31 (H) 11/06/2015 07:34 AM    WBC 7 47 09/16/2020 08:28 PM    WBC 6 52 11/06/2015 07:34 AM    HGB 10 7 (L) 09/16/2020 08:28 PM    HGB 11 7 (L) 11/06/2015 07:34 AM    HCT 32 7 (L) 09/16/2020 08:28 PM    HCT 35 4 (L) 11/06/2015 07:34 AM    MCV 95 09/16/2020 08:28 PM    MCV 89 11/06/2015 07:34 AM     09/16/2020 08:28 PM     11/06/2015 07:34 AM     Temp Readings from Last 3 Encounters:   09/16/20 98 4 °F (36 9 °C)   09/04/20 97 5 °F (36 4 °C) (Temporal)   08/27/20 98 °F (36 7 °C)     Vancomycin Days of Therapy: 2    Assessment/Plan  The patient is currently on vancomycin utilizing scheduled dosing based on actual body weight  Baseline risks associated with therapy include: pre-existing renal impairment, concomitant nephrotoxic medications, and advanced age    The patient is currently receiving 100mg IV q12h and after clinical evaluation will be changed to 500mg IV after each HD session  Pharmacy will also follow closely for s/sx of nephrotoxicity, infusion reactions, and appropriateness of therapy  BMP and CBC will be ordered per protocol  Plan for trough as patient approaches steady state, prior to the 3rd  dose at approximately 0700 on 9/19/2020 (prior to HD)  Due to infection severity, will target a trough of 15-20 (appropriate for most indications)   Pharmacy will continue to follow the patients culture results and clinical progress daily      David Nichols, Pharmacist

## 2020-09-17 NOTE — SOCIAL WORK
Met with pt to discuss role as  in helping pt to develop discharge plan and to help pt carry out their plan  Pt lives in a ranch  house with his wife   Pt has 0 DENNIS  Pt has a walker , cane , bedside commode , shower chair and glucometer at home   Pt is independent with ADL's and ambulation  Pt's wife does the cooking and cleaning  Pt goes to the [facility] on [address] [place] and Saturday  Pt's wife drives him to appointments  Pt has had [facility] in the past  Pt's PCP is Dr Cheyenne Mora  Pt's cardiologist is Dr Ninoska Fatima  Pt uses the [facility] in [place]  Pt was given a discharge check list and Meds to [facility] Papers  Both reviewed with a good understanding  Pt is a 30 day readmission  Pt signed out [place] [place] on 8/27/20 after being treated for CAP and Pulmonary Edema  Pt was contacted by outpatient  Veronica Quiroz and he declined her services  Pt did see PCP on 9/4/20  I will continue to follow for any case management needs

## 2020-09-18 VITALS
OXYGEN SATURATION: 96 % | WEIGHT: 112.66 LBS | SYSTOLIC BLOOD PRESSURE: 147 MMHG | RESPIRATION RATE: 18 BRPM | DIASTOLIC BLOOD PRESSURE: 71 MMHG | TEMPERATURE: 98.7 F | HEART RATE: 75 BPM | BODY MASS INDEX: 19.34 KG/M2

## 2020-09-18 LAB
ALBUMIN SERPL BCP-MCNC: 3.1 G/DL (ref 3.5–5)
ALP SERPL-CCNC: 152 U/L (ref 46–116)
ALT SERPL W P-5'-P-CCNC: 14 U/L (ref 12–78)
ANION GAP SERPL CALCULATED.3IONS-SCNC: 11 MMOL/L (ref 4–13)
AST SERPL W P-5'-P-CCNC: 14 U/L (ref 5–45)
BASOPHILS # BLD AUTO: 0.04 THOUSANDS/ΜL (ref 0–0.1)
BASOPHILS NFR BLD AUTO: 1 % (ref 0–1)
BILIRUB SERPL-MCNC: 0.9 MG/DL (ref 0.2–1)
BUN SERPL-MCNC: 38 MG/DL (ref 5–25)
CALCIUM ALBUM COR SERPL-MCNC: 9.3 MG/DL (ref 8.3–10.1)
CALCIUM SERPL-MCNC: 8.6 MG/DL (ref 8.3–10.1)
CHLORIDE SERPL-SCNC: 101 MMOL/L (ref 100–108)
CO2 SERPL-SCNC: 24 MMOL/L (ref 21–32)
CREAT SERPL-MCNC: 4.4 MG/DL (ref 0.6–1.3)
EOSINOPHIL # BLD AUTO: 0.21 THOUSAND/ΜL (ref 0–0.61)
EOSINOPHIL NFR BLD AUTO: 3 % (ref 0–6)
ERYTHROCYTE [DISTWIDTH] IN BLOOD BY AUTOMATED COUNT: 14.1 % (ref 11.6–15.1)
GFR SERPL CREATININE-BSD FRML MDRD: 15 ML/MIN/1.73SQ M
GLUCOSE SERPL-MCNC: 144 MG/DL (ref 65–140)
GLUCOSE SERPL-MCNC: 181 MG/DL (ref 65–140)
GLUCOSE SERPL-MCNC: 238 MG/DL (ref 65–140)
HCT VFR BLD AUTO: 28 % (ref 36.5–49.3)
HGB BLD-MCNC: 8.9 G/DL (ref 12–17)
IMM GRANULOCYTES # BLD AUTO: 0.03 THOUSAND/UL (ref 0–0.2)
IMM GRANULOCYTES NFR BLD AUTO: 0 % (ref 0–2)
LYMPHOCYTES # BLD AUTO: 1.67 THOUSANDS/ΜL (ref 0.6–4.47)
LYMPHOCYTES NFR BLD AUTO: 25 % (ref 14–44)
MCH RBC QN AUTO: 30.7 PG (ref 26.8–34.3)
MCHC RBC AUTO-ENTMCNC: 31.8 G/DL (ref 31.4–37.4)
MCV RBC AUTO: 97 FL (ref 82–98)
MONOCYTES # BLD AUTO: 0.77 THOUSAND/ΜL (ref 0.17–1.22)
MONOCYTES NFR BLD AUTO: 11 % (ref 4–12)
MRSA NOSE QL CULT: NORMAL
NEUTROPHILS # BLD AUTO: 4.02 THOUSANDS/ΜL (ref 1.85–7.62)
NEUTS SEG NFR BLD AUTO: 60 % (ref 43–75)
NRBC BLD AUTO-RTO: 0 /100 WBCS
PHOSPHATE SERPL-MCNC: 4.5 MG/DL (ref 2.7–4.5)
PLATELET # BLD AUTO: 212 THOUSANDS/UL (ref 149–390)
PMV BLD AUTO: 8.5 FL (ref 8.9–12.7)
POTASSIUM SERPL-SCNC: 4.5 MMOL/L (ref 3.5–5.3)
PROCALCITONIN SERPL-MCNC: 0.43 NG/ML
PROT SERPL-MCNC: 7 G/DL (ref 6.4–8.2)
RBC # BLD AUTO: 2.9 MILLION/UL (ref 3.88–5.62)
SODIUM SERPL-SCNC: 136 MMOL/L (ref 136–145)
WBC # BLD AUTO: 6.74 THOUSAND/UL (ref 4.31–10.16)

## 2020-09-18 PROCEDURE — 82948 REAGENT STRIP/BLOOD GLUCOSE: CPT

## 2020-09-18 PROCEDURE — 97163 PT EVAL HIGH COMPLEX 45 MIN: CPT

## 2020-09-18 PROCEDURE — 80053 COMPREHEN METABOLIC PANEL: CPT | Performed by: FAMILY MEDICINE

## 2020-09-18 PROCEDURE — 99232 SBSQ HOSP IP/OBS MODERATE 35: CPT | Performed by: INTERNAL MEDICINE

## 2020-09-18 PROCEDURE — 84100 ASSAY OF PHOSPHORUS: CPT | Performed by: NURSE PRACTITIONER

## 2020-09-18 PROCEDURE — 84145 PROCALCITONIN (PCT): CPT | Performed by: FAMILY MEDICINE

## 2020-09-18 PROCEDURE — 97167 OT EVAL HIGH COMPLEX 60 MIN: CPT

## 2020-09-18 PROCEDURE — 85025 COMPLETE CBC W/AUTO DIFF WBC: CPT | Performed by: FAMILY MEDICINE

## 2020-09-18 PROCEDURE — 99239 HOSP IP/OBS DSCHRG MGMT >30: CPT | Performed by: INTERNAL MEDICINE

## 2020-09-18 RX ADMIN — AZATHIOPRINE 50 MG: 50 TABLET ORAL at 08:03

## 2020-09-18 RX ADMIN — GUAIFENESIN 600 MG: 600 TABLET ORAL at 08:03

## 2020-09-18 RX ADMIN — INSULIN LISPRO 2 UNITS: 100 INJECTION, SOLUTION INTRAVENOUS; SUBCUTANEOUS at 11:37

## 2020-09-18 RX ADMIN — PANTOPRAZOLE SODIUM 40 MG: 40 TABLET, DELAYED RELEASE ORAL at 07:48

## 2020-09-18 RX ADMIN — ASPIRIN 81 MG 81 MG: 81 TABLET ORAL at 08:03

## 2020-09-18 RX ADMIN — ISOSORBIDE MONONITRATE 30 MG: 30 TABLET, EXTENDED RELEASE ORAL at 08:03

## 2020-09-18 RX ADMIN — ATORVASTATIN CALCIUM 80 MG: 40 TABLET, FILM COATED ORAL at 08:03

## 2020-09-18 RX ADMIN — TACROLIMUS 1 MG: 1 CAPSULE ORAL at 08:03

## 2020-09-18 RX ADMIN — CARVEDILOL 6.25 MG: 3.12 TABLET, FILM COATED ORAL at 07:48

## 2020-09-18 RX ADMIN — METOPROLOL SUCCINATE 50 MG: 50 TABLET, EXTENDED RELEASE ORAL at 08:04

## 2020-09-18 RX ADMIN — INSULIN DETEMIR 10 UNITS: 100 INJECTION, SOLUTION SUBCUTANEOUS at 10:02

## 2020-09-18 RX ADMIN — PREDNISONE 5 MG: 5 TABLET ORAL at 08:04

## 2020-09-18 RX ADMIN — NIFEDIPINE 90 MG: 30 TABLET, FILM COATED, EXTENDED RELEASE ORAL at 08:03

## 2020-09-18 NOTE — PLAN OF CARE
Problem: Potential for Falls  Goal: Patient will remain free of falls  Description: INTERVENTIONS:  - Assess patient frequently for physical needs  -  Identify physical deficits and behaviors that affect risk of falls  -  [place] fall precautions as indicated by assessment   - Educate patient/family on patient safety including physical limitations  - Instruct patient to call for assistance with activity based on assessment  - Modify environment to reduce risk of injury  - Consider OT/PT consult to assist with strengthening/mobility  Outcome: Progressing     Problem: PAIN - ADULT  Goal: Verbalizes/displays adequate comfort level or baseline comfort level  Description: Interventions:  - Encourage patient to monitor pain and request assistance  - Assess pain using appropriate pain scale  - Administer analgesics based on type and severity of pain and evaluate response  - Implement non-pharmacological measures as appropriate and evaluate response  - Consider cultural and social influences on pain and pain management  - Notify physician/advanced practitioner if interventions unsuccessful or patient reports new pain  Outcome: Progressing     Problem: INFECTION - ADULT  Goal: Absence or prevention of progression during hospitalization  Description: INTERVENTIONS:  - Assess and monitor for signs and symptoms of infection  - Monitor lab/diagnostic results  - Monitor all insertion sites, i e  indwelling lines  - [place] appropriate cooling/warming therapies per order  - Administer medications as ordered  - Instruct and encourage patient and family to use good hand hygiene technique  - Identify and instruct in appropriate isolation precautions for identified infection/condition  Outcome: Progressing     Problem: SAFETY ADULT  Goal: Patient will remain free of falls  Description: INTERVENTIONS:  - Assess patient frequently for physical needs  -  Identify physical deficits and behaviors that affect risk of falls    - [place] fall precautions as indicated by assessment   - Educate patient/family on patient safety including physical limitations  - Instruct patient to call for assistance with activity based on assessment  - Modify environment to reduce risk of injury  - Consider OT/PT consult to assist with strengthening/mobility  Outcome: Progressing  Goal: Maintain or return to baseline ADL function  Description: INTERVENTIONS:  -  Assess patient's ability to carry out ADLs; assess patient's baseline for ADL function and identify physical deficits which impact ability to perform ADLs (bathing, care of mouth/teeth, toileting, grooming, dressing, etc )  - Assess/evaluate cause of self-care deficits   - Assess range of motion  - Assess patient's mobility; develop plan if impaired  - Assess patient's need for assistive devices and provide as appropriate  - Encourage maximum independence but intervene and supervise when necessary  - Involve family in performance of ADLs  - Assess for home care needs following discharge   - Consider OT consult to assist with ADL evaluation and planning for discharge  - Provide patient education as appropriate  Outcome: Progressing  Goal: Maintain or return mobility status to optimal level  Description: INTERVENTIONS:  - Assess patient's baseline mobility status (ambulation, transfers, stairs, etc )    - Identify physical deficits and behaviors that affect mobility  - Identify mobility aids required to assist with transfers and/or ambulation (gait belt, sit-to-stand, lift, walker, cane, etc )  - [place] fall precautions as indicated by assessment  - Record patient progress and toleration of activity level on Mobility SBAR; progress patient to next Phase/Stage  - Instruct patient to call for assistance with activity based on assessment  - Consider rehabilitation consult to assist with strengthening/weightbearing, etc   Outcome: Progressing     Problem: DISCHARGE PLANNING  Goal: Discharge to home or other facility with appropriate resources  Description: INTERVENTIONS:  - Identify barriers to discharge w/patient   - Arrange for needed discharge resources and transportation as appropriate  - Identify discharge learning needs (meds, wound care, etc )  - Refer to Case Management Department for coordinating discharge planning if the patient needs post-hospital services based on physician/advanced practitioner order or complex needs related to functional status, or social support system  Outcome: Progressing     Problem: Knowledge Deficit  Goal: Patient/family/caregiver demonstrates understanding of disease process, treatment plan, medications, and discharge instructions  Description: Complete learning assessment and assess knowledge base    Interventions:  - Provide teaching at level of understanding  - Provide teaching via preferred learning methods  Outcome: Progressing     Problem: METABOLIC, FLUID AND ELECTROLYTES - ADULT  Goal: Glucose maintained within target range  Description: INTERVENTIONS:  - Monitor Blood Glucose as ordered  - Assess for signs and symptoms of hyperglycemia and hypoglycemia  - Administer ordered medications to maintain glucose within target range  - Assess nutritional intake and initiate nutrition service referral as needed  Outcome: Progressing     Problem: SKIN/TISSUE INTEGRITY - ADULT  Goal: Skin integrity remains intact  Description: INTERVENTIONS  - Identify patients at risk for skin breakdown  - Assess and monitor skin integrity  - Assess and monitor nutrition and hydration status  - Monitor labs (i e  albumin)  - Assist with mobility/ambulation  - Provide appropriate hygiene as needed including keeping skin clean and dry  - Evaluate need for skin moisturizer/barrier cream  - Collaborate with interdisciplinary team (i e  Nutrition, Rehabilitation, etc )   - Patient/family teaching  Outcome: Progressing     Problem: MUSCULOSKELETAL - ADULT  Goal: Maintain or return mobility to safest level of function  Description: INTERVENTIONS:  - Assess patient's ability to carry out ADLs; assess patient's baseline for ADL function and identify physical deficits which impact ability to perform ADLs (bathing, care of mouth/teeth, toileting, grooming, dressing, etc )  - Assess/evaluate cause of self-care deficits   - Assess range of motion  - Assess patient's mobility  - Assess patient's need for assistive devices and provide as appropriate  - Encourage maximum independence but intervene and supervise when necessary  - Involve family in performance of ADLs  - Assess for home care needs following discharge   - Consider OT consult to assist with ADL evaluation and planning for discharge  - Provide patient education as appropriate  Outcome: Progressing  Goal: Maintain proper alignment of affected body part  Description: INTERVENTIONS:  - Support, maintain and protect limb and body alignment  - Provide patient/ family with appropriate education  Outcome: Progressing

## 2020-09-18 NOTE — PLAN OF CARE
Problem: Potential for Falls  Goal: Patient will remain free of falls  Description: INTERVENTIONS:  - Assess patient frequently for physical needs  -  Identify physical deficits and behaviors that affect risk of falls    -  [place] fall precautions as indicated by assessment   - Educate patient/family on patient safety including physical limitations  - Instruct patient to call for assistance with activity based on assessment  - Modify environment to reduce risk of injury  - Consider OT/PT consult to assist with strengthening/mobility  9/18/2020 1139 by Krystal Parsons RN  Outcome: Adequate for Discharge  9/18/2020 0901 by Krystal Parsons RN  Outcome: Progressing     Problem: PAIN - ADULT  Goal: Verbalizes/displays adequate comfort level or baseline comfort level  Description: Interventions:  - Encourage patient to monitor pain and request assistance  - Assess pain using appropriate pain scale  - Administer analgesics based on type and severity of pain and evaluate response  - Implement non-pharmacological measures as appropriate and evaluate response  - Consider cultural and social influences on pain and pain management  - Notify physician/advanced practitioner if interventions unsuccessful or patient reports new pain  9/18/2020 1139 by Krystal Parsons RN  Outcome: Adequate for Discharge  9/18/2020 0901 by Krystal Parsons RN  Outcome: Progressing     Problem: INFECTION - ADULT  Goal: Absence or prevention of progression during hospitalization  Description: INTERVENTIONS:  - Assess and monitor for signs and symptoms of infection  - Monitor lab/diagnostic results  - Monitor all insertion sites, i e  indwelling lines  - [place] appropriate cooling/warming therapies per order  - Administer medications as ordered  - Instruct and encourage patient and family to use good hand hygiene technique  - Identify and instruct in appropriate isolation precautions for identified infection/condition  9/18/2020 1139 by Shelby Jones RN  Outcome: Adequate for Discharge  9/18/2020 0901 by Shelby Jones RN  Outcome: Progressing     Problem: SAFETY ADULT  Goal: Patient will remain free of falls  Description: INTERVENTIONS:  - Assess patient frequently for physical needs  -  Identify physical deficits and behaviors that affect risk of falls    -  [place] fall precautions as indicated by assessment   - Educate patient/family on patient safety including physical limitations  - Instruct patient to call for assistance with activity based on assessment  - Modify environment to reduce risk of injury  - Consider OT/PT consult to assist with strengthening/mobility  9/18/2020 1139 by Shelby Jones RN  Outcome: Adequate for Discharge  9/18/2020 0901 by Shelby Jones RN  Outcome: Progressing  Goal: Maintain or return to baseline ADL function  Description: INTERVENTIONS:  -  Assess patient's ability to carry out ADLs; assess patient's baseline for ADL function and identify physical deficits which impact ability to perform ADLs (bathing, care of mouth/teeth, toileting, grooming, dressing, etc )  - Assess/evaluate cause of self-care deficits   - Assess range of motion  - Assess patient's mobility; develop plan if impaired  - Assess patient's need for assistive devices and provide as appropriate  - Encourage maximum independence but intervene and supervise when necessary  - Involve family in performance of ADLs  - Assess for home care needs following discharge   - Consider OT consult to assist with ADL evaluation and planning for discharge  - Provide patient education as appropriate  9/18/2020 1139 by Shelby Jones RN  Outcome: Adequate for Discharge  9/18/2020 0901 by Shelby Jones RN  Outcome: Progressing  Goal: Maintain or return mobility status to optimal level  Description: INTERVENTIONS:  - Assess patient's baseline mobility status (ambulation, transfers, stairs, etc )    - Identify physical deficits and behaviors that affect mobility  - Identify mobility aids required to assist with transfers and/or ambulation (gait belt, sit-to-stand, lift, walker, cane, etc )  - [place] fall precautions as indicated by assessment  - Record patient progress and toleration of activity level on Mobility SBAR; progress patient to next Phase/Stage  - Instruct patient to call for assistance with activity based on assessment  - Consider rehabilitation consult to assist with strengthening/weightbearing, etc   9/18/2020 1139 by Minna Diego RN  Outcome: Adequate for Discharge  9/18/2020 0901 by Minna Diego RN  Outcome: Progressing     Problem: DISCHARGE PLANNING  Goal: Discharge to home or other facility with appropriate resources  Description: INTERVENTIONS:  - Identify barriers to discharge w/patient   - Arrange for needed discharge resources and transportation as appropriate  - Identify discharge learning needs (meds, wound care, etc )  - Refer to Case Management Department for coordinating discharge planning if the patient needs post-hospital services based on physician/advanced practitioner order or complex needs related to functional status, or social support system  9/18/2020 1139 by Minna Diego RN  Outcome: Adequate for Discharge  9/18/2020 0901 by Minna Diego RN  Outcome: Progressing     Problem: Knowledge Deficit  Goal: Patient/family/caregiver demonstrates understanding of disease process, treatment plan, medications, and discharge instructions  Description: Complete learning assessment and assess knowledge base    Interventions:  - Provide teaching at level of understanding  - Provide teaching via preferred learning methods  9/18/2020 1139 by Minna Diego RN  Outcome: Adequate for Discharge  9/18/2020 0901 by Minna Diego RN  Outcome: Progressing     Problem: METABOLIC, FLUID AND ELECTROLYTES - ADULT  Goal: Glucose maintained within target range  Description: INTERVENTIONS:  - Monitor Blood Glucose as ordered  - Assess for signs and symptoms of hyperglycemia and hypoglycemia  - Administer ordered medications to maintain glucose within target range  - Assess nutritional intake and initiate nutrition service referral as needed  9/18/2020 1139 by Colonel Suha RN  Outcome: Adequate for Discharge  9/18/2020 0901 by Colonel Suha RN  Outcome: Progressing     Problem: SKIN/TISSUE INTEGRITY - ADULT  Goal: Skin integrity remains intact  Description: INTERVENTIONS  - Identify patients at risk for skin breakdown  - Assess and monitor skin integrity  - Assess and monitor nutrition and hydration status  - Monitor labs (i e  albumin)  - Assist with mobility/ambulation  - Provide appropriate hygiene as needed including keeping skin clean and dry  - Evaluate need for skin moisturizer/barrier cream  - Collaborate with interdisciplinary team (i e  Nutrition, Rehabilitation, etc )   - Patient/family teaching  9/18/2020 1139 by Colonel Suha RN  Outcome: Adequate for Discharge  9/18/2020 0901 by Colonel Suha RN  Outcome: Progressing     Problem: MUSCULOSKELETAL - ADULT  Goal: Maintain or return mobility to safest level of function  Description: INTERVENTIONS:  - Assess patient's ability to carry out ADLs; assess patient's baseline for ADL function and identify physical deficits which impact ability to perform ADLs (bathing, care of mouth/teeth, toileting, grooming, dressing, etc )  - Assess/evaluate cause of self-care deficits   - Assess range of motion  - Assess patient's mobility  - Assess patient's need for assistive devices and provide as appropriate  - Encourage maximum independence but intervene and supervise when necessary  - Involve family in performance of ADLs  - Assess for home care needs following discharge   - Consider OT consult to assist with ADL evaluation and planning for discharge  - Provide patient education as appropriate  9/18/2020 1139 by Colonel Suha RN  Outcome: Adequate for Discharge  9/18/2020 0901 by Nohelia Prather RN  Outcome: Progressing  Goal: Maintain proper alignment of affected body part  Description: INTERVENTIONS:  - Support, maintain and protect limb and body alignment  - Provide patient/ family with appropriate education  9/18/2020 1139 by Nohelia Prather RN  Outcome: Adequate for Discharge  9/18/2020 0901 by Nohelia Prather RN  Outcome: Progressing

## 2020-09-18 NOTE — ASSESSMENT & PLAN NOTE
Lab Results   Component Value Date    HGBA1C 7 4 (H) 09/17/2020       Recent Labs     09/17/20  1649 09/17/20 2026 09/18/20  0729 09/18/20  1122   POCGLU 230* 218* 144* 238*       Blood Sugar Average: Last 72 hrs:  (P) 213 [id number]     Hemoglobin A1c near goal   Continue current insulin regimen, ADA diet following discharge

## 2020-09-18 NOTE — PROGRESS NOTES
Progress Note - Nephrology   Marcus Villalobos 47 y o  male MRN: [id number]  Unit/Bed#: 427-46 Encounter: [id number]    A/P:  1  End-stage renal disease   Continue hemodialysis sessions Tuesday Thursday Saturday, next session will be tomorrow, September 19th   2  Secondary hyperparathyroidism   Continue to encourage dietary control, continue phosphorus binders  3  Anemia of chronic kidney disease   Hemoglobin level stable, continue monitor, continue with CAPRICE in the outpatient setting  4  The history of for living related related renal transplant on chronic immunosuppression   Continue current immunosuppression  5  Diabetic nephropathy  6  Hypoglycemia   Resolved  7  Right lower lobe infiltrate   Improved, continue follow-up according to Pulmonary recommendations      Follow up reason for today's visit:  End-stage renal disease/secondary hyperparathyroidism/anemia of chronic kidney disease-     Acute respiratory failure with hypoxia [facility])    Patient Active Problem List   Diagnosis    [facility]omyelitis [facility] [address] [facility]    Foot pain    Type 1 diabetes mellitus ([facility] [address] [facility]    Renal transplant, status post    Sepsis ([facility] [address] [facility]    Acute kidney injury ([facility] [address] [facility]    Osteomyelitis ([facility] [address] [facility]    Poor circulation    Closed fracture of distal end of right tibia with routine healing    Chronic kidney disease, stage IV (severe) ([facility] [address] [facility]    Chronic osteomyelitis of tibia ([facility] [address] [facility]    Immunosuppression ([facility] [address] )    Hypertension    DKA (diabetic ketoacidoses) ([facility])    Elevated troponin    Diarrhea    Cellulitis of ankle    Non-ST elevation myocardial infarction (NSTEMI), type 2    Urinary retention    Severe sepsis ([facility])    Acute renal failure (ARF) ([facility])    Acute kidney injury superimposed on CKD ([facility])    Metabolic acidosis    Hyperphosphatemia    Hyponatremia    Gastrointestinal hemorrhage    Bacteremia    S/P AKA (above knee amputation), right ([facility])    Acute blood loss anemia    Acute respiratory failure with hypoxia ([facility])    Pulmonary hypertension (HCC)    Chronic anemia    Depressed left ventricular ejection fraction    Acute pulmonary edema (HCC)    Hx of right BKA (HCC)    Hypertensive urgency    ESRD (end stage renal disease) on dialysis ([facility] [address] )    Coronary artery disease involving native coronary artery of native heart without angina pectoris    Pre-operative cardiovascular examination    Chronic kidney disease, unspecified    Lesion of pancreas    Abnormal CT scan, colon    Respiratory distress    Community acquired pneumonia of right lower lobe of lung    HCAP (healthcare-associated pneumonia)    Acute on chronic respiratory failure with hypoxia ([facility] [address] )         Subjective:   No acute events overnight, patient is feeling well at this time  Objective:     Vitals: Blood pressure 147/71, pulse 75, temperature 98 7 °F (37 1 °C), resp  rate 18, weight 51 1 kg (112 lb 10 5 oz), SpO2 96 %  ,Body mass index is 19 34 kg/m²  Weight (last 2 days)     Date/Time   Weight    09/18/20 0600   51 1 (112 66)    09/17/20 0600   59 (130 07)    09/16/20 1955   59 (130 07)                Intake/Output Summary (Last 24 hours) at 9/18/2020 1029  Last data filed at 9/18/2020 0830  Gross per 24 hour   Intake 780 ml   Output    Net 780 ml     I/O last 3 completed shifts: In: 3892 [P O :540; I V :500;  Other:200; IV Piggyback:50]  Out: -          Physical Exam: /71   Pulse 75   Temp 98 7 °F (37 1 °C)   Resp 18   Wt 51 1 kg (112 lb 10 5 oz)   SpO2 96%   BMI 19 34 kg/m²     General Appearance:    Alert, cooperative, no distress, appears stated age   Head:    Normocephalic, without obvious abnormality, atraumatic   Eyes:    Conjunctiva/corneas clear   Ears:    Normal external ears   Nose:   Nares normal, septum midline, mucosa normal, no drainage    or sinus tenderness   Throat:   Lips, mucosa, and tongue normal; teeth and gums normal   Neck:   Supple   Back:     Symmetric, no curvature, ROM normal, no CVA tenderness   Lungs:     Clear to auscultation bilaterally, respirations unlabored   Chest wall:    No tenderness or deformity   Heart:    Regular rate and rhythm, S1 and S2 normal, no murmur, rub   or gallop   Abdomen:     Soft, non-tender, bowel sounds active   Extremities:   Extremities normal, atraumatic, no cyanosis, trace bilateral lower extremity edema   Skin:   Skin color, texture, turgor normal, no rashes or lesions   Lymph nodes:   Cervical normal   Neurologic:   CNII-XII intact            Lab, Imaging and other studies: I have personally reviewed pertinent labs  CBC:   Lab Results   Component Value Date    WBC 6 74 09/18/2020    HGB 8 9 (L) 09/18/2020    HCT 28 0 (L) 09/18/2020    MCV 97 09/18/2020     09/18/2020    MCH 30 7 09/18/2020    MCHC 31 8 09/18/2020    RDW 14 1 09/18/2020    MPV 8 5 (L) 09/18/2020    NRBC 0 09/18/2020     CMP:   Lab Results   Component Value Date    K 4 5 09/18/2020     09/18/2020    CO2 24 09/18/2020    BUN 38 (H) 09/18/2020    CREATININE 4 40 (H) 09/18/2020    CALCIUM 8 6 09/18/2020    AST 14 09/18/2020    ALT 14 09/18/2020    ALKPHOS 152 (H) 09/18/2020    EGFR 15 09/18/2020           Results from last 7 days   Lab Units 09/18/20  0447 09/17/20  0542 09/16/20 2028   POTASSIUM mmol/L 4 5 4 2 4 2   CHLORIDE mmol/L 101 93* 93*   CO2 mmol/L 24 22 25   BUN mg/dL 38* 41* 38*   CREATININE mg/dL 4 40* 5 81* 5 12*   CALCIUM mg/dL 8 6 8 1* 8 5   ALK PHOS U/L 152*  --  167*   ALT U/L 14  --  19   AST U/L 14  --  17         Phosphorus:   Lab Results   Component Value Date    PHOS 4 5 09/18/2020     Magnesium: No results found for: MG  Urinalysis: No results found for: COLORU, CLARITYU, SPECGRAV, PHUR, LEUKOCYTESUR, NITRITE, PROTEINUA, GLUCOSEU, KETONESU, BILIRUBINUR, BLOODU  Ionized Calcium: No results found for: CAION  Coagulation: No results found for: PT, INR, APTT  Troponin: No results found for: TROPONINI  ABG: No results found for: PHART, NEG5JIA, PO2ART, IMV3ELV, H9VYKIJN, BEART, SOURCE  Radiology review:     IMAGING  Procedure: Xr Chest Portable    Result Date: 9/17/2020  Narrative: CHEST INDICATION:   Respiratory failure  COMPARISON:  9/16/2020 EXAM PERFORMED/VIEWS:  XR CHEST PORTABLE FINDINGS: The heart remains mildly enlarged  There is been improvement in what now represents mild vascular congestion suggesting volume overload  There is been interim improvement in the interstitial and alveolar edema noted in the mid lower lung zones  No focal consolidation identified  No pneumothorax or effusion is appreciated  Osseous structures appear within normal limits for patient age  Impression: Cardiomegaly with interim improvement in congestive heart failure with mild residual vascular congestion  Workstation performed: [id number]     Procedure: Xr Chest 1 View Portable    Result Date: 9/17/2020  Narrative: CHEST INDICATION:   hypoglycemia  COMPARISON:  09/04/2020, 08/25/2020 EXAM PERFORMED/VIEWS:  XR CHEST PORTABLE 1 image FINDINGS: Cardiomediastinal silhouette appears enlarged  The patient appears to be in mild CHF  Asymmetric density in the right lower lobe may represent a superimposed infiltrate  There is no pneumothorax  Are no pleural effusions  Osseous structures appear within normal limits for patient age  Impression: 1  Cardiomegaly and mild CHF  2   Suspected superimposed infiltrate in the right lower lobe   Workstation performed: [id number]       Current Facility-Administered Medications   Medication Dose Route Frequency    acetaminophen (TYLENOL) tablet 650 mg  650 mg Oral Q6H PRN    albuterol inhalation solution 2 5 mg  2 5 mg Nebulization Q4H PRN    aspirin chewable tablet 81 mg  81 mg Oral QAM    atorvastatin (LIPITOR) tablet 80 mg  80 mg Oral QAM    azaTHIOprine (IMURAN) tablet 50 mg  50 mg Oral QAM    carvedilol (COREG) tablet 6 25 mg  6 25 mg Oral BID With Meals    dextrose 50 % IV solution 25 mL  25 mL Intravenous Q2H PRN    guaiFENesin (MUCINEX) 12 hr tablet 600 mg  600 mg Oral BID    heparin (porcine) subcutaneous injection 5,000 Units  5,000 Units Subcutaneous Q8H [address]    insulin detemir (LEVEMIR) subcutaneous injection 10 Units  10 Units Subcutaneous Q12H [address]    insulin lispro (HumaLOG) 100 units/mL subcutaneous injection 1-5 Units  1-5 Units Subcutaneous TID AC    insulin lispro (HumaLOG) 100 units/mL subcutaneous injection 1-5 Units  1-5 Units Subcutaneous HS    isosorbide mononitrate (IMDUR) 24 hr tablet 30 mg  30 mg Oral Daily    metoprolol succinate (TOPROL-XL) 24 hr tablet 50 mg  50 mg Oral QAM    NIFEdipine (PROCARDIA XL) 24 hr tablet 90 mg  90 mg Oral Daily    pantoprazole (PROTONIX) EC tablet 40 mg  40 mg Oral Early Morning    predniSONE tablet 5 mg  5 mg Oral Daily    tacrolimus (PROGRAF) capsule 1 mg  1 mg Oral TID    vancomycin (VANCOCIN) 500 mg in sodium chloride 0 9 % 100 mL IVPB  10 mg/kg Intravenous Once per day on Tue Thu Sat     Medications Discontinued During This Encounter   Medication Reason    cefepime (MAXIPIME) IVPB (premix in dextrose) 1,000 mg 50 mL     insulin detemir (LEVEMIR) subcutaneous injection 8 Units     insulin detemir (LEVEMIR) subcutaneous injection 10 Units     cefepime (MAXIPIME) IVPB (premix in dextrose) 1,000 mg 50 mL     vancomycin (VANCOCIN) 500 mg in sodium chloride 0 9% 100 mL IVPB     vancomycin (VANCOCIN) 500 mg in sodium chloride 0 9% 100 mL IVPB        Natacha Baxter,       This progress note was produced in part using a dictation device which may document imprecise wording from author's original intent

## 2020-09-18 NOTE — PHYSICAL THERAPY NOTE
Physical Therapy Evaluation    Patient Name: Delmar Ross    KYNQS'B Date: 9/18/2020     Problem List  Principal Problem:    Acute respiratory failure with hypoxia ([facility] )  Active Problems:    Type 1 diabetes mellitus (HCC)    Immunosuppression (HCC)    Hyponatremia    Chronic anemia    ESRD (end stage renal disease) on dialysis [facility])       Past Medical History  Past Medical History:   Diagnosis Date    Bacteremia 12/21/2018    CAD (coronary artery disease)     s/p MARC to LCx, pLAD 2/2018    Cardiac arrest ([facility] )     Chronic kidney disease     Diabetes mellitus type 1 ([facility] )     Dialysis patient ([facility] )     Access in right chest    GI bleed     Hyperlipidemia     Hypertension     Infection at site of external fixator pin ([facility] )     [facility]     Pneumonia     Last Assessed [id number]    Renal failure     Renal transplant, status post 07/21/2007        Past Surgical History  Past Surgical History:   Procedure Laterality Date    AMPUTATION Right 02/2019    aboce knee    ANKLE FRACTURE SURGERY      ANKLE HARDWARE REMOVAL Right 7/31/2017    Procedure: REMOVAL HARDWARE ANKLE;  Surgeon: Johnson Bonds MD;  [facility];  Service: Orthopedics    ANKLE HARDWARE REMOVAL Right 8/17/2017    Procedure: TIBIA FAILED HARDWARE REMOVAL;  Surgeon: Johnson Bonds MD;  [facility];  Service: Orthopedics    CLOSED REDUCTION ANKLE Right 7/3/2017    Procedure: CLOSED REDUCTION DISTAL TIB-FIB AND CASTING VS;  Surgeon: Johnson Bonds MD;  [facility];  Service: Orthopedics    CORONARY ANGIOPLASTY WITH STENT PLACEMENT  02/2018    CAD s/p MARC to LCx, pLAD    ESOPHAGOGASTRODUODENOSCOPY N/A 12/20/2018    Procedure: ESOPHAGOGASTRODUODENOSCOPY (EGD) in ICU; Surgeon: Debbie Mora MD;  [facility]: [facility];   Service: Gastroenterology    EYE SURGERY      FRACTURE SURGERY      ORIF Rt Ankle    GLUTAMIC ACID DECARBOXYLASE (HISTORICAL)      IR AV FISTULAGRAM/GRAFTOGRAM  4/16/2020    IR PICC LINE  8/20/2018    IR TUNNELED DIALYSIS CATHETER CHECK/CHANGE/REPOSITION/ANGIOPLASTY  1/8/2020    IR TUNNELED DIALYSIS CATHETER PLACEMENT  10/21/2019    IR TUNNELED DIALYSIS CATHETER REMOVAL  5/29/2020    IR VENOUS LINE REMOVAL  10/5/2018    LEG AMPUTATION Right 02/01/2019    Above the knee    NEPHRECTOMY TRANSPLANTED ORGAN      NM ANASTOMOSIS,AV,ANY SITE Right 2/11/2020    Procedure: CREATION FISTULA ARTERIOVENOUS (AV) right upper extremity;  Surgeon: Annmarie Banuelos MD;  Location: [facility] MAIN OR;  Service: Vascular    NM OPEN TREATMENT FRACTURE DISTAL TIBIA FIBULA Right 7/3/2017    Procedure: OPEN REDUCTION W/ INTERNAL FIXATION (ORIF); Surgeon: Ruddy Worthington MD;  [facility]: [facility];  Service: Orthopedics    TOE AMPUTATION Right 10/27/2016    Procedure: AMPUTATION TOE;  Surgeon: Nicolasa Hernandez DPM;  [facility]: [facility];  Service:            09/18/20 1011   Note Type   Note type Eval only   Pain Assessment   Pain Assessment Tool Pain Assessment not indicated - pt denies pain   Pain Score No Pain   Home Living   Type of [address] One level;Ramped entrance   [address]; Wheelchair-manual   Additional Comments pt reports use of RW with his prosthetics, otherwise utilizes w/c   Prior Function   Level of [place] Independent with ADLs and functional mobility; Needs assistance with IADLs   Lives With Spouse   Receives Help From Family   ADL Assistance Independent   IADLs Needs assistance   Falls in the last 6 months 0   Vocational Full time employment   Comments spouse drives   Restrictions/Precautions   Weight Bearing Precautions Per Order No   Other Precautions Fall Risk   General   Family/Caregiver Present No   Cognition   Overall Cognitive Status WFL   Arousal/Participation Alert   Orientation Level Oriented X4   Following Commands Follows all commands and directions without difficulty   RLE Assessment   RLE Assessment X  (s/p AKA; residual limb WFL)   LLE Assessment   LLE Assessment WFL  (4/5 grossly)   Bed Mobility   Additional Comments pt OOB at start/end of session   Transfers   Sit to Stand 6  Modified independent   Stand to Sit 6  Modified independent   Stand pivot 6  Modified independent   Additional Comments no device used   Ambulation/Elevation   Gait pattern Not appropriate; Not tested   Balance   Static Sitting Good   Dynamic Sitting Good   Static Standing Good   Dynamic Standing Good   Endurance Deficit   Endurance Deficit No   Activity Tolerance   Activity Tolerance Patient tolerated treatment well   Assessment   Prognosis Good   Assessment Patient is a 47 y o  male evaluated by Physical Therapy s/p admit to [address] [place] [address],[address] on 9/16/2020 with admitting diagnosis of: Pneumonia, Low blood sugar, Hypoxia, ESRD (end stage renal disease) on dialysis, Hypoglycemia due to insulin, and principal problem of: Acute respiratory failure with hypoxia  PT was consulted to assess patient's functional mobility and discharge needs  Upon evaluation, pt able to perform all functional transfers mod(I)  At baseline, pt utilizes RW to ambulate with R prosthetic leg  Currently, pt able to transfer mod(I) from all surfaces and manage manual w/c without assist  Pt to be discharged this date and is appropriate to from a mobility standpoint  Goals   Patient Goals to go home   Plan   PT Frequency One time visit   Recommendation   PT Discharge Recommendation Return to previous environment with social support   PT - OK to Discharge Yes     Pt seated EOB at end of session with all needs in reach

## 2020-09-18 NOTE — OCCUPATIONAL THERAPY NOTE
Occupational Therapy Evaluation     Patient Name: Vishal Howe  UOIQL'L Date: 9/18/2020  Problem List  Principal Problem:    Acute respiratory failure with hypoxia ([facility] )  Active Problems:    Type 1 diabetes mellitus (HCC)    Immunosuppression (HCC)    Hyponatremia    Chronic anemia    ESRD (end stage renal disease) on dialysis [facility])    Past Medical History  Past Medical History:   Diagnosis Date    Bacteremia 12/21/2018    CAD (coronary artery disease)     s/p MARC to LCx, pLAD 2/2018    Cardiac arrest ([facility] )     Chronic kidney disease     Diabetes mellitus type 1 ([facility] )     Dialysis patient ([facility] )     Access in right chest    GI bleed     Hyperlipidemia     Hypertension     Infection at site of external fixator pin ([facility] )     [facility]     Pneumonia     Last Assessed [id number]    Renal failure     Renal transplant, status post 07/21/2007     Past Surgical History  Past Surgical History:   Procedure Laterality Date    AMPUTATION Right 02/2019    aboce knee    ANKLE FRACTURE SURGERY      ANKLE HARDWARE REMOVAL Right 7/31/2017    Procedure: REMOVAL HARDWARE ANKLE;  Surgeon: Michele Cooper MD;  [facility];  Service: Orthopedics    ANKLE HARDWARE REMOVAL Right 8/17/2017    Procedure: TIBIA FAILED HARDWARE REMOVAL;  Surgeon: Michele Cooper MD;  [facility];  Service: Orthopedics    CLOSED REDUCTION ANKLE Right 7/3/2017    Procedure: CLOSED REDUCTION DISTAL TIB-FIB AND CASTING VS;  Surgeon: Michele Cooper MD;  [facility];  Service: Orthopedics    CORONARY ANGIOPLASTY WITH STENT PLACEMENT  02/2018    CAD s/p MARC to LCx, pLAD    ESOPHAGOGASTRODUODENOSCOPY N/A 12/20/2018    Procedure: ESOPHAGOGASTRODUODENOSCOPY (EGD) in ICU; Surgeon: Xiomara Vásquez MD;  [facility]: [facility];   Service: Gastroenterology    EYE SURGERY      FRACTURE SURGERY      ORIF Rt Ankle    GLUTAMIC ACID DECARBOXYLASE (HISTORICAL)      IR AV FISTULAGRAM/GRAFTOGRAM  4/16/2020  IR PICC LINE  8/20/2018    IR TUNNELED DIALYSIS CATHETER CHECK/CHANGE/REPOSITION/ANGIOPLASTY  1/8/2020    IR TUNNELED DIALYSIS CATHETER PLACEMENT  10/21/2019    IR TUNNELED DIALYSIS CATHETER REMOVAL  5/29/2020    IR VENOUS LINE REMOVAL  10/5/2018    LEG AMPUTATION Right 02/01/2019    Above the knee    NEPHRECTOMY TRANSPLANTED ORGAN      [place] ANASTOMOSIS,AV,ANY SITE Right 2/11/2020    Procedure: CREATION FISTULA ARTERIOVENOUS (AV) right upper extremity;  Surgeon: Brian Mclean MD;  Location: [facility] MAIN OR;  Service: Vascular    [place] OPEN TREATMENT FRACTURE DISTAL TIBIA FIBULA Right 7/3/2017    Procedure: OPEN REDUCTION W/ INTERNAL FIXATION (ORIF);   Surgeon: Jordy Velazquez MD;  [facility];  Service: Orthopedics    TOE AMPUTATION Right 10/27/2016    Procedure: AMPUTATION TOE;  Surgeon: Justina Hatch DPM;  [facility];  Service:              09/18/20 1010   Note Type   Note type Eval only   Restrictions/Precautions   Weight Bearing Precautions Per Order No   Other Precautions Fall Risk   Pain Assessment   Pain Assessment Tool Pain Assessment not indicated - pt denies pain   Home Living   Additional Comments see PT evaluation for details   Prior Function   Level of [place] Independent with ADLs and functional mobility   Lives With Spouse   Receives Help From Family   ADL Assistance Independent   IADLs Needs assistance   Falls in the last 6 months 0   Vocational Full time employment   Comments spouse drives   Psychosocial   Psychosocial (WDL) WDL   Subjective   Subjective "I am going to work from here"   ADL   Where Assessed Edge of bed   UB Dressing Assistance 7  Independent   LB Dressing Assistance 7  [address]  7  Independent   Bed Mobility   Additional Comments pt seated EOB at start and end of session; no complaints of SOB; Spo2 WFL   Transfers   Sit to Stand 6  Modified independent   Stand to Sit 6  Modified independent   Stand pivot 6  Modified independent Additional Comments performed w/c transfer during session   Functional Mobility   Additional Comments nonambulatory   Balance   Static Sitting Good   Dynamic Sitting Good   Static Standing Good   Dynamic Standing Good   Activity Tolerance   Activity Tolerance Patient tolerated treatment well   RUE Assessment   RUE Assessment WFL   LUE Assessment   LUE Assessment WFL   Hand Function   Gross Motor Coordination Functional   Fine Motor Coordination Functional   Sensation   Light Touch No apparent deficits   Sharp/Dull No apparent deficits   Cognition   Overall Cognitive Status WFL   Arousal/Participation Alert   Attention Within functional limits   Orientation Level Oriented X4   Memory Within functional limits   Following Commands Follows all commands and directions without difficulty   Assessment   Assessment Pt is a 47 y o  male seen for OT evaluation s/p admit to [facility] on 9/16/2020 w/ Acute respiratory failure with hypoxia ([facility] )  Comorbidities affecting pt's functional performance at time of assessment include: DM, HTN and renal failure, renal transplant, MI, cardiac arrest, GI bleed, dialysis  Personal factors affecting pt at time of IE include:difficulty performing ADLS, difficulty performing IADLS , limited insight into deficits, decreased initiation and engagement  and health management   Prior to admission, pt was (I) with ADLs and IADLs with use of w/c during mobility  Upon evaluation: Pt requires mod (I) level with w/c during mobility 2* the following deficits impacting occupational performance: weakness, decreased strength, decreased balance, decreased tolerance, impaired initiation, decreased safety awareness and impaired interpersonal skills  From OT standpoint, recommendation at time of d/c would be home with spouse support     Goals   Patient Goals to go home today   Recommendation   OT Discharge Recommendation Return to previous environment with no needs   Barthel Index   Feeding 10   Bathing 5 Grooming Score 5   Dressing Score 10   Bladder Score 10   Bowels Score 10   Toilet Use Score 10   Transfers (Bed/Chair) Score 15   Mobility (Level Surface) Score 5   Stairs Score 0   Barthel Index Score 80     Pt is performing at [facility]; OT services will be discontinued at this time  Pt left seated in chair at end of session; all needs within reach; all lines intact

## 2020-09-18 NOTE — NURSING NOTE
Instructions reviewed with patient who verbalized understanding  Patient in no acute distress at time of d/c

## 2020-09-18 NOTE — UTILIZATION REVIEW
Notification of Discharge  This is a Notification of Discharge from our facility [address]  Please be advised that this patient has been discharge from our facility  Below you will find the admission and discharge date and time including the patients disposition  PRESENTATION DATE: 9/16/2020  7:50 PM  OBS ADMISSION DATE:   IP ADMISSION DATE: 9/16/20 2231   DISCHARGE DATE: 9/18/2020 12:29 PM  DISPOSITION: Home/Self Care Home/Self Care   Admission Orders listed below:  Admission Orders (From admission, onward)     Ordered        09/16/20 2231  Inpatient Admission (expected length of stay for this patient Order details is greater than two midnights)  Once                   Please contact the UR Department if additional information is required to close this patient's authorization/case  [address] [facility] Utilization Review Department  Main: [phone] x carefully listen to the prompts  All voicemails are confidential   [email or web address]  Send all requests for admission clinical reviews, approved or denied determinations and any other requests to dedicated fax number below belonging to the campus where the patient is receiving treatment   List of dedicated fax numbers:  [address] DENIALS (Administrative/Medical Necessity) [phone]   [address]  (Maternity/NICU/Pediatrics) [phone]   Jose Daniel Denise [phone]   [place] [facility] [phone]   Mikel Cheung [phone]   [place] Griffiths [facility] [address] [facility] [phone]   [facility]  [phone]   [address] [place] [address]  [address] [facility] [address] [facility] [phone]

## 2020-09-18 NOTE — SOCIAL WORK
Pt is being discharged today  Pt's wife will give the patient a ride home  I in basket messaged Dr Zac Bonds office to call the patient at home with a follow up appointment  Hedy Driscoll reached out to patient recently and patient refused to have her call her back   AVS and discharge instructions reviewed with patient good understanding  Case Management reviewed discharge planning process including the following: identifying help that is needed at home, pt's preference for discharge needs and Meds at [facility]  Reviewed with Pt that any member of the healthcare team can answer questions regarding : medications, jmportance of recognizing  Signs and symptoms of any  medical problems  Case Management also encouraged pt to follow up with all recommended appointments after discharge  Karrie Nissen

## 2020-09-18 NOTE — PROGRESS NOTES
Progress Note - Pulmonary   Uriah Almendarez 47 y o  male MRN: [id number]  Unit/Bed#: 417-01 Encounter: [id number]    Assessment/Plan:    1  Abnormal CXR secondary to volume overload  1  Repeat CXR s/p HD yesterday revealed resolved RLL infiltrate/vascular congestion  2  PCT likely elevated in the setting of renal disease  3  Continue to monitor off antibiotics  2  ESRD on HD  1  Continue HD TTS  2  Continue fluid restriction and renal diet  3  Avoid nephrotoxic agents   4  Further treatment per primary team   3  Diabetes mellitus type 1   1  Monitor blood glucose closely  2  Further treatment per primary team  4  Chronic immunosuppression  1  History of LRRT  2  Continue prednisone, tacrolimus, azathioprine  5  Anemia of CKD  1  Trending upward today, 8 9 today  2  Transfuse for Hgb less than 7 or symptomatic     Patient is stable from pulmonary standpoint  Will sign off  Call with questions  Chief Complaint:    "I'm ready to leave "    Subjective:    Patient was seen and examiend today  No overnight events  Patient is seen sitting at edge of bed on room air in no acute distress  Denies SOB< CP, cough, wheeze, sputum production, hemoptysis, fever, chill, dizziness, headache, N/V/D  Patient tolerated increased time yesterday on dialysis  Objective:    Vitals: Blood pressure 147/71, pulse 75, temperature 98 7 °F (37 1 °C), resp  rate 18, weight 51 1 kg (112 lb 10 5 oz), SpO2 96 %  room air,Body mass index is 19 34 kg/m²  Intake/Output Summary (Last 24 hours) at 9/18/2020 1009  Last data filed at 9/18/2020 0830  Gross per 24 hour   Intake 780 ml   Output    Net 780 ml       Invasive Devices     Peripheral Intravenous Line            Peripheral IV 09/16/20 Left Antecubital 1 day          Line            Hemodialysis AV Fistula Right Upper arm -- days                Physical Exam:     Physical Exam  Vitals signs reviewed  Constitutional:       Appearance: Normal appearance     HENT:      Head: Normocephalic and atraumatic  Right Ear: External ear normal       Left Ear: External ear normal       Nose: Nose normal       Mouth/Throat:      Mouth: Mucous membranes are moist       Pharynx: Oropharynx is clear  Eyes:      Extraocular Movements: Extraocular movements intact  Conjunctiva/sclera: Conjunctivae normal       Pupils: Pupils are equal, round, and reactive to light  Neck:      Musculoskeletal: Normal range of motion and neck supple  Cardiovascular:      Rate and Rhythm: Normal rate and regular rhythm  Pulmonary:      Effort: Pulmonary effort is normal  No respiratory distress  Breath sounds: Normal breath sounds  No wheezing, rhonchi or rales  Chest:      Chest wall: No tenderness  Abdominal:      Palpations: Abdomen is soft  Tenderness: There is no abdominal tenderness  Hernia: No hernia is present  Musculoskeletal:         General: No swelling or tenderness  Comments: Right AKA   Skin:     General: Skin is warm and dry  Neurological:      General: No focal deficit present  Mental Status: He is alert and oriented to person, place, and time  Mental status is at baseline  Psychiatric:         Mood and Affect: Mood normal          Behavior: Behavior normal          Thought Content: Thought content normal          Judgment: Judgment normal          Labs: I have personally reviewed pertinent lab results  , ABG: No results found for: PHART, IEM2JHU, PO2ART, GPJ1SPE, E3EYEILS, BEART, SOURCE, BNP: No results found for: BNP, CBC:   Lab Results   Component Value Date    WBC 6 74 09/18/2020    HGB 8 9 (L) 09/18/2020    HCT 28 0 (L) 09/18/2020    MCV 97 09/18/2020     09/18/2020    MCH 30 7 09/18/2020    MCHC 31 8 09/18/2020    RDW 14 1 09/18/2020    MPV 8 5 (L) 09/18/2020    NRBC 0 09/18/2020   , CMP:   Lab Results   Component Value Date    SODIUM 136 09/18/2020    K 4 5 09/18/2020     09/18/2020    CO2 24 09/18/2020    BUN 38 (H) 09/18/2020 CREATININE 4 40 (H) 09/18/2020    CALCIUM 8 6 09/18/2020    AST 14 09/18/2020    ALT 14 09/18/2020    ALKPHOS 152 (H) 09/18/2020    EGFR 15 09/18/2020   , PT/INR: No results found for: PT, INR, Troponin: No results found for: TROPONINI      Imaging and other studies: I have personally reviewed pertinent reports  and I have personally reviewed pertinent films in PACS     CXR 9/17/20  Improved with only mild pulmonary vascular congestion

## 2020-09-18 NOTE — ASSESSMENT & PLAN NOTE
Likely secondary to vascular congestion in patient with a history of diastolic dysfunction and ESRD on HD  · Procalcitonin minimally elevated, but likely secondary to renal disease  · Antibiotics discontinued  · Repeat chest x-ray with resolution of previous findings  · Patient is ready and stable for discharge today

## 2020-09-18 NOTE — DISCHARGE SUMMARY
Discharge- Flaco Lau 1970, 47 y o  male MRN: [id number]    Unit/Bed#: 320-13 Encounter: [id number]    Primary Care Provider: Chandler Buitrago DO   Date and time admitted to hospital: 9/16/2020  7:50 PM        * Acute respiratory failure with hypoxia [facility])  Assessment & Plan  Likely secondary to vascular congestion in patient with a history of diastolic dysfunction and ESRD on HD  · Procalcitonin minimally elevated, but likely secondary to renal disease  · Antibiotics discontinued  · Repeat chest x-ray with resolution of previous findings  · Patient is ready and stable for discharge today  ESRD (end stage renal disease) on dialysis [facility])  Assessment & Plan  Continue HD TTS  Hyponatremia  Assessment & Plan  Resolved  Type 1 diabetes mellitus [facility])  Assessment & Plan  Lab Results   Component Value Date    HGBA1C 7 4 (H) 09/17/2020       Recent Labs     09/17/20  1649 09/17/20  2026 09/18/20  0729 09/18/20  1122   POCGLU 230* 218* 144* 238*       Blood Sugar Average: Last 72 hrs:  (P) 213 [id number]     Hemoglobin A1c near goal   Continue current insulin regimen, ADA diet following discharge  Immunosuppression ([facility] [address] )  Assessment & Plan  s/p LRRT, chronically on azathioprine, tacrolimus, and daily steroids with low-dose prednisone  Chronic anemia  Assessment & Plan  Likely anemia of chronic disease secondary to ESRD  Continue to monitor          Discharging Physician / Practitioner: Zacarias Bond MD  PCP: Chandler Buitrago DO  Admission Date:   Admission Orders (From admission, onward)     Ordered        09/16/20 2231  Inpatient Admission (expected length of stay for this patient Order details is greater than two midnights)  Once                   Discharge Date: 09/18/20    Resolved Problems  Date Reviewed: 9/18/2020    None          Consultations During Hospital Stay:  · Nephrology, Pulmonology    Procedures Performed:   · HD    Significant Findings / Test Results:   Xr Chest Portable    Result Date: 9/17/2020  Impression: Cardiomegaly with interim improvement in congestive heart failure with mild residual vascular congestion  Workstation performed: [id number]     Xr Chest 1 View Portable    Result Date: 9/17/2020  Impression: 1  Cardiomegaly and mild CHF  2   Suspected superimposed infiltrate in the right lower lobe  Workstation performed: [id number]       Incidental Findings:   · As above  Test Results Pending at Discharge (will require follow up): · None     Outpatient Tests Requested:  · None    Complications:  None    Reason for Admission:  Hypoxia, suspected pneumonia    Hospital Course:     14-year-old man on chronic immunosuppressive therapy, admitted from [facility] ED with diagnosis of pneumonia and hypoxia  Mr Abhishek Prince has past medical history significant for type 1 DM, previous LRRT on chronic immunosuppressive therapy, ESRD on HD, CAD with prior NSTEMI s/p stenting, prior right-sided AKA, mention of previous GI bleed, and prior history of osteomyelitis  Patient presented initially to the ED for evaluation of persistent hypoglycemia, at which time he was found to be slightly hypoxic  Chest x-ray was significant for cardiomegaly with mild vascular congestion, with but a superimposed infiltrate in the RLL was suspected  He was referred for admission for further evaluation treatment  Following admission the patient was found to have no signs or symptoms of pneumonia, although immunosuppressed  He did have a recent history of pneumonia, radiographically resolved by CXR approximately 2 weeks prior  He was maintained on broad-spectrum antibiotic therapy, underwent hemodialysis, with repeat CXR showing essential resolution of the prior findings, making vascular congestion more likely diagnosis  He had a minimal elevation in procalcitonin that remained unchanged, and likely due to his end-stage renal disease    Pulmonology was consulted, and related that patient's findings were unlikely to be a bacterial pneumonia  The patient in fact feels vastly improved and at baseline following dialysis and fluid removal   His antibiotics were discontinued, the patient is being discharged home in stable condition and apparently at his baseline  He has had no further evidence of hypoglycemia while hospitalized  Please see above list of diagnoses and related plan for additional information  Condition at Discharge: stable     Discharge Day Visit / Exam:     * Please refer to separate progress note for these details *    Discharge instructions/Information to patient and family:   See after visit summary for information provided to patient and family  Provisions for Follow-Up Care:  See after visit summary for information related to follow-up care and any pertinent home health orders  Disposition:     Home    For Discharges to [facility] SN[facility]:   · Not Applicable to this Patient - Not Applicable to this Patient    Planned Readmission: No     Discharge Statement:  I spent 35 minutes discharging the patient  This time was spent on the day of discharge  I had direct contact with the patient on the day of discharge  Greater than 50% of the total time was spent examining patient, answering all patient questions, arranging and discussing plan of care with patient as well as directly providing post-discharge instructions  Additional time then spent on discharge activities  Discharge Medications:  See after visit summary for reconciled discharge medications provided to patient and family        ** Please Note: This note has been constructed using a voice recognition system **

## 2020-09-18 NOTE — PROGRESS NOTES
Vancomycin IV Pharmacy-to-Dose Consultation    Kar Bee is a 47 y o  male who is currently receiving Vancomycin IV with management by the Pharmacy Consult service  Assessment/Plan:  The patient was reviewed  It appears no vanco  dose was given yesterday despite patient receiving dialysis  Will give dose now  Based on todays assessment, continue current vancomycin (day # 2) dosing of 500mg after each dialysis, with a plan for trough to be drawn tomorrow pre dialysis  We will continue to follow the patients culture results and clinical progress daily      Christianne Aceves, Pharmacist

## 2020-09-20 LAB — GLUCOSE SERPL-MCNC: 179 MG/DL (ref 65–140)

## 2020-09-21 ENCOUNTER — TRANSITIONAL CARE MANAGEMENT (OUTPATIENT)
Dept: FAMILY MEDICINE CLINIC | Facility: CLINIC | Age: 50
End: 2020-09-21

## 2020-09-22 LAB
BACTERIA BLD CULT: NORMAL
BACTERIA BLD CULT: NORMAL

## 2020-10-07 DIAGNOSIS — I10 ESSENTIAL HYPERTENSION: ICD-10-CM

## 2020-10-07 RX ORDER — METOPROLOL SUCCINATE 25 MG/1
25 TABLET, EXTENDED RELEASE ORAL 2 TIMES DAILY
Qty: 180 TABLET | Refills: 3 | Status: SHIPPED | OUTPATIENT
Start: 2020-10-07 | End: 2021-02-10 | Stop reason: HOSPADM

## 2020-10-14 ENCOUNTER — IMMUNIZATIONS (OUTPATIENT)
Dept: FAMILY MEDICINE CLINIC | Facility: CLINIC | Age: 50
End: 2020-10-14
Payer: COMMERCIAL

## 2020-10-14 DIAGNOSIS — Z23 NEED FOR INFLUENZA VACCINATION: Primary | ICD-10-CM

## 2020-10-14 PROCEDURE — 90682 RIV4 VACC RECOMBINANT DNA IM: CPT | Performed by: FAMILY MEDICINE

## 2020-10-14 PROCEDURE — 90471 IMMUNIZATION ADMIN: CPT | Performed by: FAMILY MEDICINE

## 2020-10-18 ENCOUNTER — HOSPITAL ENCOUNTER (EMERGENCY)
Facility: HOSPITAL | Age: 50
Discharge: HOME | End: 2020-10-18
Attending: EMERGENCY MEDICINE | Admitting: EMERGENCY MEDICINE
Payer: COMMERCIAL

## 2020-10-18 ENCOUNTER — APPOINTMENT (EMERGENCY)
Dept: RADIOLOGY | Facility: HOSPITAL | Age: 50
End: 2020-10-18
Payer: COMMERCIAL

## 2020-10-18 VITALS
DIASTOLIC BLOOD PRESSURE: 84 MMHG | RESPIRATION RATE: 18 BRPM | OXYGEN SATURATION: 97 % | BODY MASS INDEX: 18.67 KG/M2 | WEIGHT: 109.35 LBS | TEMPERATURE: 97.6 F | HEIGHT: 64 IN | HEART RATE: 90 BPM | SYSTOLIC BLOOD PRESSURE: 178 MMHG

## 2020-10-18 DIAGNOSIS — I10 ESSENTIAL HYPERTENSION: Primary | ICD-10-CM

## 2020-10-18 LAB
ALBUMIN SERPL BCP-MCNC: 3.2 G/DL (ref 3.5–5)
ALP SERPL-CCNC: 166 U/L (ref 46–116)
ALT SERPL W P-5'-P-CCNC: 21 U/L (ref 12–78)
ANION GAP SERPL CALCULATED.3IONS-SCNC: 5 MMOL/L (ref 4–13)
APTT PPP: 37 SECONDS (ref 23–37)
AST SERPL W P-5'-P-CCNC: 17 U/L (ref 5–45)
BASOPHILS # BLD AUTO: 0.05 THOUSANDS/ΜL (ref 0–0.1)
BASOPHILS NFR BLD AUTO: 1 % (ref 0–1)
BILIRUB SERPL-MCNC: 0.5 MG/DL (ref 0.2–1)
BUN SERPL-MCNC: 32 MG/DL (ref 5–25)
CALCIUM ALBUM COR SERPL-MCNC: 10.8 MG/DL (ref 8.3–10.1)
CALCIUM SERPL-MCNC: 10.2 MG/DL (ref 8.3–10.1)
CHLORIDE SERPL-SCNC: 101 MMOL/L (ref 100–108)
CO2 SERPL-SCNC: 31 MMOL/L (ref 21–32)
CREAT SERPL-MCNC: 4.01 MG/DL (ref 0.6–1.3)
EOSINOPHIL # BLD AUTO: 0.22 THOUSAND/ΜL (ref 0–0.61)
EOSINOPHIL NFR BLD AUTO: 3 % (ref 0–6)
ERYTHROCYTE [DISTWIDTH] IN BLOOD BY AUTOMATED COUNT: 14.4 % (ref 11.6–15.1)
GFR SERPL CREATININE-BSD FRML MDRD: 16 ML/MIN/1.73SQ M
GLUCOSE SERPL-MCNC: 166 MG/DL (ref 65–140)
HCT VFR BLD AUTO: 24.6 % (ref 36.5–49.3)
HGB BLD-MCNC: 8.1 G/DL (ref 12–17)
IMM GRANULOCYTES # BLD AUTO: 0.02 THOUSAND/UL (ref 0–0.2)
IMM GRANULOCYTES NFR BLD AUTO: 0 % (ref 0–2)
INR PPP: 1.08 (ref 0.84–1.19)
LIPASE SERPL-CCNC: 62 U/L (ref 73–393)
LYMPHOCYTES # BLD AUTO: 1.97 THOUSANDS/ΜL (ref 0.6–4.47)
LYMPHOCYTES NFR BLD AUTO: 28 % (ref 14–44)
MAGNESIUM SERPL-MCNC: 2.4 MG/DL (ref 1.6–2.6)
MCH RBC QN AUTO: 32.8 PG (ref 26.8–34.3)
MCHC RBC AUTO-ENTMCNC: 32.9 G/DL (ref 31.4–37.4)
MCV RBC AUTO: 100 FL (ref 82–98)
MONOCYTES # BLD AUTO: 0.71 THOUSAND/ΜL (ref 0.17–1.22)
MONOCYTES NFR BLD AUTO: 10 % (ref 4–12)
NEUTROPHILS # BLD AUTO: 4.02 THOUSANDS/ΜL (ref 1.85–7.62)
NEUTS SEG NFR BLD AUTO: 58 % (ref 43–75)
NRBC BLD AUTO-RTO: 0 /100 WBCS
NT-PROBNP SERPL-MCNC: ABNORMAL PG/ML
PLATELET # BLD AUTO: 260 THOUSANDS/UL (ref 149–390)
PMV BLD AUTO: 8.3 FL (ref 8.9–12.7)
POTASSIUM SERPL-SCNC: 4.6 MMOL/L (ref 3.5–5.3)
PROT SERPL-MCNC: 7.3 G/DL (ref 6.4–8.2)
PROTHROMBIN TIME: 13.8 SECONDS (ref 11.6–14.5)
RBC # BLD AUTO: 2.47 MILLION/UL (ref 3.88–5.62)
SODIUM SERPL-SCNC: 137 MMOL/L (ref 136–145)
TROPONIN I SERPL-MCNC: 0.03 NG/ML
WBC # BLD AUTO: 6.99 THOUSAND/UL (ref 4.31–10.16)

## 2020-10-18 PROCEDURE — 83880 ASSAY OF NATRIURETIC PEPTIDE: CPT | Performed by: EMERGENCY MEDICINE

## 2020-10-18 PROCEDURE — 83735 ASSAY OF MAGNESIUM: CPT | Performed by: EMERGENCY MEDICINE

## 2020-10-18 PROCEDURE — 85730 THROMBOPLASTIN TIME PARTIAL: CPT | Performed by: EMERGENCY MEDICINE

## 2020-10-18 PROCEDURE — 84484 ASSAY OF TROPONIN QUANT: CPT | Performed by: EMERGENCY MEDICINE

## 2020-10-18 PROCEDURE — 71045 X-RAY EXAM CHEST 1 VIEW: CPT

## 2020-10-18 PROCEDURE — 85610 PROTHROMBIN TIME: CPT | Performed by: EMERGENCY MEDICINE

## 2020-10-18 PROCEDURE — 96374 THER/PROPH/DIAG INJ IV PUSH: CPT

## 2020-10-18 PROCEDURE — 99284 EMERGENCY DEPT VISIT MOD MDM: CPT | Performed by: EMERGENCY MEDICINE

## 2020-10-18 PROCEDURE — 36415 COLL VENOUS BLD VENIPUNCTURE: CPT | Performed by: EMERGENCY MEDICINE

## 2020-10-18 PROCEDURE — 83690 ASSAY OF LIPASE: CPT | Performed by: EMERGENCY MEDICINE

## 2020-10-18 PROCEDURE — 80053 COMPREHEN METABOLIC PANEL: CPT | Performed by: EMERGENCY MEDICINE

## 2020-10-18 PROCEDURE — 99284 EMERGENCY DEPT VISIT MOD MDM: CPT

## 2020-10-18 PROCEDURE — 93005 ELECTROCARDIOGRAM TRACING: CPT

## 2020-10-18 PROCEDURE — 85025 COMPLETE CBC W/AUTO DIFF WBC: CPT | Performed by: EMERGENCY MEDICINE

## 2020-10-18 RX ORDER — LABETALOL 20 MG/4 ML (5 MG/ML) INTRAVENOUS SYRINGE
10 ONCE
Status: COMPLETED | OUTPATIENT
Start: 2020-10-18 | End: 2020-10-18

## 2020-10-18 RX ORDER — SODIUM CHLORIDE 9 MG/ML
3 INJECTION INTRAVENOUS
Status: DISCONTINUED | OUTPATIENT
Start: 2020-10-18 | End: 2020-10-18 | Stop reason: HOSPADM

## 2020-10-18 RX ADMIN — LABETALOL HYDROCHLORIDE 10 MG: 5 INJECTION, SOLUTION INTRAVENOUS at 01:52

## 2020-10-19 LAB
ATRIAL RATE: 94 BPM
P AXIS: 61 DEGREES
PR INTERVAL: 144 MS
QRS AXIS: 27 DEGREES
QRSD INTERVAL: 88 MS
QT INTERVAL: 358 MS
QTC INTERVAL: 447 MS
T WAVE AXIS: 80 DEGREES
VENTRICULAR RATE: 94 BPM

## 2020-10-19 PROCEDURE — 93010 ELECTROCARDIOGRAM REPORT: CPT | Performed by: INTERNAL MEDICINE

## 2020-10-22 DIAGNOSIS — N17.9 ACUTE KIDNEY INJURY (HCC): ICD-10-CM

## 2020-10-22 RX ORDER — PREDNISONE 1 MG/1
5 TABLET ORAL DAILY
Qty: 90 TABLET | Refills: 1 | Status: SHIPPED | OUTPATIENT
Start: 2020-10-22 | End: 2021-04-20 | Stop reason: SDUPTHER

## 2020-11-14 DIAGNOSIS — E83.39 HYPERPHOSPHATEMIA: Primary | ICD-10-CM

## 2020-11-14 RX ORDER — SEVELAMER HYDROCHLORIDE 400 MG/1
800 TABLET, FILM COATED ORAL
Qty: 240 TABLET | Refills: 1 | Status: SHIPPED | OUTPATIENT
Start: 2020-11-14 | End: 2020-11-14

## 2020-11-14 RX ORDER — SEVELAMER HYDROCHLORIDE 800 MG/1
800 TABLET, FILM COATED ORAL
Qty: 240 TABLET | Refills: 2 | Status: SHIPPED | OUTPATIENT
Start: 2020-11-14 | End: 2020-11-23

## 2020-11-16 ENCOUNTER — TELEPHONE (OUTPATIENT)
Dept: NEPHROLOGY | Facility: CLINIC | Age: 50
End: 2020-11-16

## 2020-11-18 ENCOUNTER — TELEPHONE (OUTPATIENT)
Dept: NEPHROLOGY | Facility: CLINIC | Age: 50
End: 2020-11-18

## 2020-11-22 ENCOUNTER — HOSPITAL ENCOUNTER (EMERGENCY)
Facility: HOSPITAL | Age: 50
Discharge: HOME | End: 2020-11-22
Attending: EMERGENCY MEDICINE | Admitting: EMERGENCY MEDICINE
Payer: COMMERCIAL

## 2020-11-22 VITALS
WEIGHT: 107.58 LBS | OXYGEN SATURATION: 96 % | HEART RATE: 77 BPM | SYSTOLIC BLOOD PRESSURE: 184 MMHG | BODY MASS INDEX: 18.47 KG/M2 | TEMPERATURE: 98.1 F | RESPIRATION RATE: 16 BRPM | DIASTOLIC BLOOD PRESSURE: 85 MMHG

## 2020-11-22 DIAGNOSIS — Z01.89 PATIENT REQUEST FOR DIAGNOSTIC TESTING: Primary | ICD-10-CM

## 2020-11-22 PROCEDURE — 99283 EMERGENCY DEPT VISIT LOW MDM: CPT

## 2020-11-22 PROCEDURE — 99284 EMERGENCY DEPT VISIT MOD MDM: CPT | Performed by: PHYSICIAN ASSISTANT

## 2020-11-23 DIAGNOSIS — E83.39 HYPERPHOSPHATEMIA: Primary | ICD-10-CM

## 2020-11-23 RX ORDER — SEVELAMER CARBONATE FOR ORAL SUSPENSION 800 MG/1
0.8 POWDER, FOR SUSPENSION ORAL
Qty: 90 EACH | Refills: 2 | Status: ON HOLD | OUTPATIENT
Start: 2020-11-23 | End: 2021-02-08 | Stop reason: ALTCHOICE

## 2020-11-25 ENCOUNTER — TELEPHONE (OUTPATIENT)
Dept: NEPHROLOGY | Facility: CLINIC | Age: 50
End: 2020-11-25

## 2020-11-25 DIAGNOSIS — E83.52 HYPERCALCEMIA: Primary | ICD-10-CM

## 2020-11-25 RX ORDER — CINACALCET 30 MG/1
30 TABLET, FILM COATED ORAL DAILY
Qty: 30 TABLET | Refills: 1 | Status: ON HOLD | OUTPATIENT
Start: 2020-11-25 | End: 2021-02-08 | Stop reason: ALTCHOICE

## 2020-11-27 DIAGNOSIS — N18.4 TYPE 1 DIABETES MELLITUS WITH STAGE 4 CHRONIC KIDNEY DISEASE (HCC): Primary | ICD-10-CM

## 2020-11-27 DIAGNOSIS — E10.22 TYPE 1 DIABETES MELLITUS WITH STAGE 4 CHRONIC KIDNEY DISEASE (HCC): Primary | ICD-10-CM

## 2020-11-27 RX ORDER — BLOOD SUGAR DIAGNOSTIC
STRIP MISCELLANEOUS
Qty: 600 EACH | Refills: 3 | Status: SHIPPED | OUTPATIENT
Start: 2020-11-27 | End: 2021-04-19

## 2020-12-10 DIAGNOSIS — I10 HYPERTENSION: ICD-10-CM

## 2020-12-10 RX ORDER — CARVEDILOL 6.25 MG/1
6.25 TABLET ORAL 2 TIMES DAILY WITH MEALS
Qty: 180 TABLET | Refills: 2 | Status: SHIPPED | OUTPATIENT
Start: 2020-12-10 | End: 2021-03-01 | Stop reason: SDUPTHER

## 2020-12-29 DIAGNOSIS — E55.9 VITAMIN D DEFICIENCY: Primary | ICD-10-CM

## 2020-12-29 DIAGNOSIS — E10.9 DIABETES MELLITUS TYPE 1 (HCC): ICD-10-CM

## 2020-12-29 RX ORDER — ERGOCALCIFEROL (VITAMIN D2) 1250 MCG
50000 CAPSULE ORAL WEEKLY
Qty: 12 CAPSULE | Refills: 3 | Status: SHIPPED | OUTPATIENT
Start: 2020-12-29 | End: 2022-05-05

## 2021-01-19 ENCOUNTER — IMMUNIZATIONS (OUTPATIENT)
Dept: FAMILY MEDICINE CLINIC | Facility: HOSPITAL | Age: 51
End: 2021-01-19

## 2021-01-19 DIAGNOSIS — Z23 ENCOUNTER FOR IMMUNIZATION: Primary | ICD-10-CM

## 2021-01-19 PROCEDURE — 0011A SARS-COV-2 / COVID-19 MRNA VACCINE (MODERNA) 100 MCG: CPT

## 2021-01-19 PROCEDURE — 91301 SARS-COV-2 / COVID-19 MRNA VACCINE (MODERNA) 100 MCG: CPT

## 2021-01-21 ENCOUNTER — TELEPHONE (OUTPATIENT)
Dept: GASTROENTEROLOGY | Facility: CLINIC | Age: 51
End: 2021-01-21

## 2021-01-21 NOTE — TELEPHONE ENCOUNTER
Patient of Dr Kayla Soliman, last seen 6/22/20    History of abnormal CT scan of colon, lesion of pancreas    Received call from [facility] with [facility] regarding patient's kidney transplant  She wanted to know if patient needs colonoscopy done to clear him for renal transplant  I did advise that in 6/22/20 Dr Lisa Quach had recommended EUS and colonoscopy, but states "this should not delay his listing for renal transplant"  She states they would like more recent confirmation from us that patient does not need colonoscopy to be cleared for renal transplant  She asks I call her back at [phone], and her fax # is [phone]   Please advise

## 2021-01-22 NOTE — TELEPHONE ENCOUNTER
Pt does need colon & EUS to evaluate pancreatic lesion & for screening for colon cancer  However these should not delay him from transplant unless this is part of their guidelines prior to transplant    Mary Or

## 2021-01-25 NOTE — TELEPHONE ENCOUNTER
Patient made aware:  Pt does need colon & EUS to evaluate pancreatic lesion & for screening for colon cancer  However these should not delay him from transplant unless this is part of their guidelines prior to transplant  Patient verbalized understanding he is currently completing 3 diagnostic tests per [facility] at this time & stated he will schedule appointment for procedures

## 2021-02-01 ENCOUNTER — NURSE TRIAGE (OUTPATIENT)
Dept: OTHER | Facility: OTHER | Age: 51
End: 2021-02-01

## 2021-02-01 ENCOUNTER — TELEPHONE (OUTPATIENT)
Dept: OTHER | Facility: OTHER | Age: 51
End: 2021-02-01

## 2021-02-01 DIAGNOSIS — E87.5 HYPERKALEMIA: Primary | ICD-10-CM

## 2021-02-01 RX ORDER — SODIUM POLYSTYRENE SULFONATE 15 G/60ML
SUSPENSION ORAL; RECTAL
Qty: 120 ML | Refills: 5 | Status: SHIPPED | OUTPATIENT
Start: 2021-02-01 | End: 2021-05-28

## 2021-02-01 NOTE — TELEPHONE ENCOUNTER
Reason for Disposition   [1] Caller requesting NON-URGENT health information AND [2] PCP's office is the best resource    Answer Assessment - Initial Assessment Questions  1   REASON FOR CALL or QUESTION: "What is your reason for calling today?" or "How can I best help you?" or "What question do you have that I can help answer?"      Worried about pt not getting dialysis today and asking to call nephrology for kayexalate rx    Protocols used: INFORMATION ONLY CALL-ADULT-

## 2021-02-01 NOTE — TELEPHONE ENCOUNTER
Caller request callback stating she was advised that a Rx was to be called into patient's pharmacy due to him not having his dialysis today because of the weather [phone] (pharmacy)     Paged Dr Margarete Bernheim

## 2021-02-01 NOTE — TELEPHONE ENCOUNTER
Regarding: Missed dialysis   ----- Message from Kike Husain sent at 2/1/2021 10:14 AM EST -----  " My  was called to cancel his dialysis today  It's been too many days for him   He has a poor hx when he misses dialysis and I would like to see if his physician could call I for  kaexylat script until he's able to have his dialysis"   [phone] (pharmacy)

## 2021-02-03 NOTE — TELEPHONE ENCOUNTER
Called pt today 2/3/21 (as office was closed 2/1 and 2/2)  Pt did NOT  script at pharmacy  He said he was not advised that a script was called in  He will pickup today

## 2021-02-08 ENCOUNTER — APPOINTMENT (INPATIENT)
Dept: CT IMAGING | Facility: HOSPITAL | Age: 51
DRG: 291 | End: 2021-02-08
Payer: COMMERCIAL

## 2021-02-08 ENCOUNTER — APPOINTMENT (EMERGENCY)
Dept: RADIOLOGY | Facility: HOSPITAL | Age: 51
DRG: 291 | End: 2021-02-08
Payer: COMMERCIAL

## 2021-02-08 ENCOUNTER — HOSPITAL ENCOUNTER (INPATIENT)
Facility: HOSPITAL | Age: 51
LOS: 2 days | Discharge: HOME | DRG: 291 | End: 2021-02-10
Attending: EMERGENCY MEDICINE | Admitting: INTERNAL MEDICINE
Payer: COMMERCIAL

## 2021-02-08 ENCOUNTER — APPOINTMENT (INPATIENT)
Dept: DIALYSIS | Facility: HOSPITAL | Age: 51
DRG: 291 | End: 2021-02-08
Payer: COMMERCIAL

## 2021-02-08 DIAGNOSIS — I34.0 MITRAL VALVE INSUFFICIENCY, UNSPECIFIED ETIOLOGY: ICD-10-CM

## 2021-02-08 DIAGNOSIS — J96.00 ACUTE RESPIRATORY FAILURE, UNSPECIFIED WHETHER WITH HYPOXIA OR HYPERCAPNIA (HCC): Primary | ICD-10-CM

## 2021-02-08 DIAGNOSIS — R06.89 DYSPNEA AND RESPIRATORY ABNORMALITY: ICD-10-CM

## 2021-02-08 DIAGNOSIS — N18.6 ESRD (END STAGE RENAL DISEASE) ON DIALYSIS (HCC): ICD-10-CM

## 2021-02-08 DIAGNOSIS — R06.00 DYSPNEA AND RESPIRATORY ABNORMALITY: ICD-10-CM

## 2021-02-08 DIAGNOSIS — I50.33 ACUTE ON CHRONIC DIASTOLIC CHF (CONGESTIVE HEART FAILURE) (HCC): ICD-10-CM

## 2021-02-08 DIAGNOSIS — Z99.2 ESRD (END STAGE RENAL DISEASE) ON DIALYSIS (HCC): ICD-10-CM

## 2021-02-08 DIAGNOSIS — Z94.0 RENAL TRANSPLANT, STATUS POST: ICD-10-CM

## 2021-02-08 PROBLEM — J18.9 COMMUNITY ACQUIRED PNEUMONIA OF RIGHT LUNG: Status: ACTIVE | Noted: 2021-02-08

## 2021-02-08 LAB
ALBUMIN SERPL BCP-MCNC: 3.8 G/DL (ref 3.5–5)
ALP SERPL-CCNC: 140 U/L (ref 46–116)
ALT SERPL W P-5'-P-CCNC: 25 U/L (ref 12–78)
ANION GAP SERPL CALCULATED.3IONS-SCNC: 16 MMOL/L (ref 4–13)
APTT PPP: 43 SECONDS (ref 23–37)
ARTERIAL PATENCY WRIST A: YES
AST SERPL W P-5'-P-CCNC: 12 U/L (ref 5–45)
ATRIAL RATE: 72 BPM
BASE EX.OXY STD BLDV CALC-SCNC: 92.1 % (ref 60–80)
BASE EXCESS BLDA CALC-SCNC: -4.9 MMOL/L
BASE EXCESS BLDV CALC-SCNC: -4.7 MMOL/L
BASOPHILS # BLD AUTO: 0.09 THOUSANDS/ΜL (ref 0–0.1)
BASOPHILS NFR BLD AUTO: 1 % (ref 0–1)
BILIRUB SERPL-MCNC: 0.9 MG/DL (ref 0.2–1)
BUN SERPL-MCNC: 71 MG/DL (ref 5–25)
CALCIUM SERPL-MCNC: 9.7 MG/DL (ref 8.3–10.1)
CHLORIDE SERPL-SCNC: 102 MMOL/L (ref 100–108)
CO2 SERPL-SCNC: 21 MMOL/L (ref 21–32)
CREAT SERPL-MCNC: 7.65 MG/DL (ref 0.6–1.3)
EOSINOPHIL # BLD AUTO: 0.3 THOUSAND/ΜL (ref 0–0.61)
EOSINOPHIL NFR BLD AUTO: 3 % (ref 0–6)
ERYTHROCYTE [DISTWIDTH] IN BLOOD BY AUTOMATED COUNT: 13.8 % (ref 11.6–15.1)
FLUAV RNA RESP QL NAA+PROBE: NEGATIVE
FLUBV RNA RESP QL NAA+PROBE: NEGATIVE
GFR SERPL CREATININE-BSD FRML MDRD: 7 ML/MIN/1.73SQ M
GLUCOSE SERPL-MCNC: 148 MG/DL (ref 65–140)
GLUCOSE SERPL-MCNC: 203 MG/DL (ref 65–140)
GLUCOSE SERPL-MCNC: 239 MG/DL (ref 65–140)
GLUCOSE SERPL-MCNC: 274 MG/DL (ref 65–140)
HCO3 BLDA-SCNC: 19.9 MMOL/L (ref 22–28)
HCO3 BLDV-SCNC: 20.7 MMOL/L (ref 24–30)
HCT VFR BLD AUTO: 39.9 % (ref 36.5–49.3)
HGB BLD-MCNC: 12.9 G/DL (ref 12–17)
IMM GRANULOCYTES # BLD AUTO: 0.05 THOUSAND/UL (ref 0–0.2)
IMM GRANULOCYTES NFR BLD AUTO: 0 % (ref 0–2)
INR PPP: 1.01 (ref 0.84–1.19)
IPAP: 18
LACTATE SERPL-SCNC: 1.4 MMOL/L (ref 0.5–2)
LIPASE SERPL-CCNC: 40 U/L (ref 73–393)
LYMPHOCYTES # BLD AUTO: 1.88 THOUSANDS/ΜL (ref 0.6–4.47)
LYMPHOCYTES NFR BLD AUTO: 16 % (ref 14–44)
MAGNESIUM SERPL-MCNC: 3 MG/DL (ref 1.6–2.6)
MCH RBC QN AUTO: 32.7 PG (ref 26.8–34.3)
MCHC RBC AUTO-ENTMCNC: 32.3 G/DL (ref 31.4–37.4)
MCV RBC AUTO: 101 FL (ref 82–98)
MONOCYTES # BLD AUTO: 0.67 THOUSAND/ΜL (ref 0.17–1.22)
MONOCYTES NFR BLD AUTO: 6 % (ref 4–12)
NEUTROPHILS # BLD AUTO: 9.02 THOUSANDS/ΜL (ref 1.85–7.62)
NEUTS SEG NFR BLD AUTO: 74 % (ref 43–75)
NON VENT- BIPAP: ABNORMAL
NRBC BLD AUTO-RTO: 0 /100 WBCS
NT-PROBNP SERPL-MCNC: ABNORMAL PG/ML
O2 CT BLDA-SCNC: 16.7 ML/DL (ref 16–23)
O2 CT BLDV-SCNC: 15.4 ML/DL
OXYHGB MFR BLDA: 97.8 % (ref 94–97)
P AXIS: 49 DEGREES
PCO2 BLDA: 35.7 MM HG (ref 36–44)
PCO2 BLDV: 39.5 MM HG (ref 42–50)
PEEP MAX SETTING VENT: 6 CM[H2O]
PH BLDA: 7.36 [PH] (ref 7.35–7.45)
PH BLDV: 7.34 [PH] (ref 7.3–7.4)
PLATELET # BLD AUTO: 260 THOUSANDS/UL (ref 149–390)
PMV BLD AUTO: 8.3 FL (ref 8.9–12.7)
PO2 BLDA: 132.1 MM HG (ref 75–129)
PO2 BLDV: 78.7 MM HG (ref 35–45)
POTASSIUM SERPL-SCNC: 5.4 MMOL/L (ref 3.5–5.3)
PR INTERVAL: 124 MS
PROCALCITONIN SERPL-MCNC: 0.58 NG/ML
PROT SERPL-MCNC: 8 G/DL (ref 6.4–8.2)
PROTHROMBIN TIME: 13.1 SECONDS (ref 11.6–14.5)
QRS AXIS: 20 DEGREES
QRSD INTERVAL: 92 MS
QT INTERVAL: 418 MS
QTC INTERVAL: 457 MS
RBC # BLD AUTO: 3.95 MILLION/UL (ref 3.88–5.62)
RSV RNA RESP QL NAA+PROBE: NEGATIVE
SARS-COV-2 RNA RESP QL NAA+PROBE: NEGATIVE
SODIUM SERPL-SCNC: 139 MMOL/L (ref 136–145)
SPECIMEN SOURCE: ABNORMAL
T WAVE AXIS: 85 DEGREES
TROPONIN I SERPL-MCNC: <0.02 NG/ML
VENT BIPAP FIO2: 70 %
VENTRICULAR RATE: 72 BPM
WBC # BLD AUTO: 12.01 THOUSAND/UL (ref 4.31–10.16)

## 2021-02-08 PROCEDURE — G1004 CDSM NDSC: HCPCS

## 2021-02-08 PROCEDURE — 0241U HB NFCT DS VIR RESP RNA 4 TRGT: CPT | Performed by: EMERGENCY MEDICINE

## 2021-02-08 PROCEDURE — 96365 THER/PROPH/DIAG IV INF INIT: CPT

## 2021-02-08 PROCEDURE — 85610 PROTHROMBIN TIME: CPT | Performed by: EMERGENCY MEDICINE

## 2021-02-08 PROCEDURE — 80053 COMPREHEN METABOLIC PANEL: CPT | Performed by: EMERGENCY MEDICINE

## 2021-02-08 PROCEDURE — 82805 BLOOD GASES W/O2 SATURATION: CPT | Performed by: INTERNAL MEDICINE

## 2021-02-08 PROCEDURE — 87040 BLOOD CULTURE FOR BACTERIA: CPT | Performed by: EMERGENCY MEDICINE

## 2021-02-08 PROCEDURE — 71045 X-RAY EXAM CHEST 1 VIEW: CPT

## 2021-02-08 PROCEDURE — 85730 THROMBOPLASTIN TIME PARTIAL: CPT | Performed by: EMERGENCY MEDICINE

## 2021-02-08 PROCEDURE — 94644 CONT INHLJ TX 1ST HOUR: CPT

## 2021-02-08 PROCEDURE — 82948 REAGENT STRIP/BLOOD GLUCOSE: CPT

## 2021-02-08 PROCEDURE — 94760 N-INVAS EAR/PLS OXIMETRY 1: CPT

## 2021-02-08 PROCEDURE — 83735 ASSAY OF MAGNESIUM: CPT | Performed by: EMERGENCY MEDICINE

## 2021-02-08 PROCEDURE — 99291 CRITICAL CARE FIRST HOUR: CPT | Performed by: EMERGENCY MEDICINE

## 2021-02-08 PROCEDURE — 85025 COMPLETE CBC W/AUTO DIFF WBC: CPT | Performed by: EMERGENCY MEDICINE

## 2021-02-08 PROCEDURE — 99292 CRITICAL CARE ADDL 30 MIN: CPT | Performed by: INTERNAL MEDICINE

## 2021-02-08 PROCEDURE — 99291 CRITICAL CARE FIRST HOUR: CPT

## 2021-02-08 PROCEDURE — 93010 ELECTROCARDIOGRAM REPORT: CPT | Performed by: INTERNAL MEDICINE

## 2021-02-08 PROCEDURE — 94002 VENT MGMT INPAT INIT DAY: CPT

## 2021-02-08 PROCEDURE — 84145 PROCALCITONIN (PCT): CPT | Performed by: STUDENT IN AN ORGANIZED HEALTH CARE EDUCATION/TRAINING PROGRAM

## 2021-02-08 PROCEDURE — 82805 BLOOD GASES W/O2 SATURATION: CPT | Performed by: EMERGENCY MEDICINE

## 2021-02-08 PROCEDURE — 99255 IP/OBS CONSLTJ NEW/EST HI 80: CPT | Performed by: INTERNAL MEDICINE

## 2021-02-08 PROCEDURE — 93005 ELECTROCARDIOGRAM TRACING: CPT

## 2021-02-08 PROCEDURE — 83605 ASSAY OF LACTIC ACID: CPT | Performed by: EMERGENCY MEDICINE

## 2021-02-08 PROCEDURE — 36600 WITHDRAWAL OF ARTERIAL BLOOD: CPT

## 2021-02-08 PROCEDURE — 84484 ASSAY OF TROPONIN QUANT: CPT | Performed by: EMERGENCY MEDICINE

## 2021-02-08 PROCEDURE — 99254 IP/OBS CNSLTJ NEW/EST MOD 60: CPT | Performed by: INTERNAL MEDICINE

## 2021-02-08 PROCEDURE — 71250 CT THORAX DX C-: CPT

## 2021-02-08 PROCEDURE — 83690 ASSAY OF LIPASE: CPT | Performed by: EMERGENCY MEDICINE

## 2021-02-08 PROCEDURE — 83880 ASSAY OF NATRIURETIC PEPTIDE: CPT | Performed by: EMERGENCY MEDICINE

## 2021-02-08 PROCEDURE — 99223 1ST HOSP IP/OBS HIGH 75: CPT | Performed by: INTERNAL MEDICINE

## 2021-02-08 PROCEDURE — 36415 COLL VENOUS BLD VENIPUNCTURE: CPT | Performed by: STUDENT IN AN ORGANIZED HEALTH CARE EDUCATION/TRAINING PROGRAM

## 2021-02-08 RX ORDER — PREDNISONE 1 MG/1
5 TABLET ORAL DAILY
Status: DISCONTINUED | OUTPATIENT
Start: 2021-02-08 | End: 2021-02-10 | Stop reason: HOSPADM

## 2021-02-08 RX ORDER — ATORVASTATIN CALCIUM 40 MG/1
80 TABLET, FILM COATED ORAL EVERY MORNING
Status: CANCELLED | OUTPATIENT
Start: 2021-02-08

## 2021-02-08 RX ORDER — LABETALOL 20 MG/4 ML (5 MG/ML) INTRAVENOUS SYRINGE
10 EVERY 4 HOURS PRN
Status: DISCONTINUED | OUTPATIENT
Start: 2021-02-08 | End: 2021-02-10 | Stop reason: HOSPADM

## 2021-02-08 RX ORDER — PREDNISONE 10 MG/1
5 TABLET ORAL DAILY
Status: CANCELLED | OUTPATIENT
Start: 2021-02-08

## 2021-02-08 RX ORDER — CARVEDILOL 3.12 MG/1
6.25 TABLET ORAL 2 TIMES DAILY WITH MEALS
Status: CANCELLED | OUTPATIENT
Start: 2021-02-08

## 2021-02-08 RX ORDER — TACROLIMUS 1 MG/1
1 CAPSULE ORAL 3 TIMES DAILY
Status: CANCELLED | OUTPATIENT
Start: 2021-02-08

## 2021-02-08 RX ORDER — ASPIRIN 81 MG/1
81 TABLET, CHEWABLE ORAL EVERY MORNING
Status: CANCELLED | OUTPATIENT
Start: 2021-02-08

## 2021-02-08 RX ORDER — TACROLIMUS 1 MG/1
1 CAPSULE ORAL 3 TIMES DAILY
Status: DISCONTINUED | OUTPATIENT
Start: 2021-02-08 | End: 2021-02-08

## 2021-02-08 RX ORDER — TACROLIMUS 1 MG/1
2 CAPSULE ORAL DAILY
Status: DISCONTINUED | OUTPATIENT
Start: 2021-02-08 | End: 2021-02-10 | Stop reason: HOSPADM

## 2021-02-08 RX ORDER — TACROLIMUS 1 MG/1
1 CAPSULE ORAL
Status: DISCONTINUED | OUTPATIENT
Start: 2021-02-08 | End: 2021-02-10 | Stop reason: HOSPADM

## 2021-02-08 RX ORDER — ALBUTEROL SULFATE 2.5 MG/3ML
10 SOLUTION RESPIRATORY (INHALATION) ONCE
Status: COMPLETED | OUTPATIENT
Start: 2021-02-08 | End: 2021-02-08

## 2021-02-08 RX ORDER — ISOSORBIDE MONONITRATE 30 MG/1
30 TABLET, EXTENDED RELEASE ORAL DAILY
Status: CANCELLED | OUTPATIENT
Start: 2021-02-08

## 2021-02-08 RX ORDER — HYDROMORPHONE HCL/PF 1 MG/ML
0.5 SYRINGE (ML) INJECTION EVERY 4 HOURS PRN
Status: DISCONTINUED | OUTPATIENT
Start: 2021-02-08 | End: 2021-02-10 | Stop reason: HOSPADM

## 2021-02-08 RX ORDER — METOPROLOL SUCCINATE 25 MG/1
25 TABLET, EXTENDED RELEASE ORAL 2 TIMES DAILY
Status: CANCELLED | OUTPATIENT
Start: 2021-02-08

## 2021-02-08 RX ORDER — HEPARIN SODIUM 5000 [USP'U]/ML
5000 INJECTION, SOLUTION INTRAVENOUS; SUBCUTANEOUS EVERY 8 HOURS SCHEDULED
Status: DISCONTINUED | OUTPATIENT
Start: 2021-02-08 | End: 2021-02-10 | Stop reason: HOSPADM

## 2021-02-08 RX ORDER — ACETAMINOPHEN 325 MG/1
650 TABLET ORAL EVERY 6 HOURS PRN
Status: DISCONTINUED | OUTPATIENT
Start: 2021-02-08 | End: 2021-02-10 | Stop reason: HOSPADM

## 2021-02-08 RX ORDER — PANTOPRAZOLE SODIUM 40 MG/1
40 TABLET, DELAYED RELEASE ORAL
Status: DISCONTINUED | OUTPATIENT
Start: 2021-02-08 | End: 2021-02-10 | Stop reason: HOSPADM

## 2021-02-08 RX ORDER — SODIUM CHLORIDE 9 MG/ML
3 INJECTION INTRAVENOUS
Status: DISCONTINUED | OUTPATIENT
Start: 2021-02-08 | End: 2021-02-10 | Stop reason: HOSPADM

## 2021-02-08 RX ORDER — HYDROMORPHONE HCL/PF 1 MG/ML
0.2 SYRINGE (ML) INJECTION EVERY 4 HOURS PRN
Status: DISCONTINUED | OUTPATIENT
Start: 2021-02-08 | End: 2021-02-10 | Stop reason: HOSPADM

## 2021-02-08 RX ORDER — ATORVASTATIN CALCIUM 40 MG/1
80 TABLET, FILM COATED ORAL EVERY EVENING
Status: DISCONTINUED | OUTPATIENT
Start: 2021-02-08 | End: 2021-02-10 | Stop reason: HOSPADM

## 2021-02-08 RX ORDER — AZATHIOPRINE 50 MG/1
50 TABLET ORAL EVERY MORNING
Status: DISCONTINUED | OUTPATIENT
Start: 2021-02-08 | End: 2021-02-10 | Stop reason: HOSPADM

## 2021-02-08 RX ORDER — NIFEDIPINE 30 MG/1
90 TABLET, EXTENDED RELEASE ORAL DAILY
Status: DISCONTINUED | OUTPATIENT
Start: 2021-02-08 | End: 2021-02-10 | Stop reason: HOSPADM

## 2021-02-08 RX ORDER — HYDRALAZINE HYDROCHLORIDE 50 MG/1
50 TABLET, FILM COATED ORAL 3 TIMES DAILY
COMMUNITY
End: 2022-02-09

## 2021-02-08 RX ORDER — ONDANSETRON 4 MG/1
4 TABLET, ORALLY DISINTEGRATING ORAL EVERY 6 HOURS PRN
Status: CANCELLED | OUTPATIENT
Start: 2021-02-08

## 2021-02-08 RX ORDER — CEFEPIME HYDROCHLORIDE 2 G/50ML
2000 INJECTION, SOLUTION INTRAVENOUS ONCE
Status: COMPLETED | OUTPATIENT
Start: 2021-02-08 | End: 2021-02-08

## 2021-02-08 RX ORDER — CEFTRIAXONE 1 G/50ML
1000 INJECTION, SOLUTION INTRAVENOUS EVERY 24 HOURS
Status: DISCONTINUED | OUTPATIENT
Start: 2021-02-09 | End: 2021-02-08

## 2021-02-08 RX ORDER — PANTOPRAZOLE SODIUM 40 MG/1
40 TABLET, DELAYED RELEASE ORAL DAILY
Status: CANCELLED | OUTPATIENT
Start: 2021-02-08

## 2021-02-08 RX ORDER — ISOSORBIDE MONONITRATE 30 MG/1
30 TABLET, EXTENDED RELEASE ORAL DAILY
Status: DISCONTINUED | OUTPATIENT
Start: 2021-02-08 | End: 2021-02-10 | Stop reason: HOSPADM

## 2021-02-08 RX ORDER — CARVEDILOL 3.12 MG/1
6.25 TABLET ORAL 2 TIMES DAILY WITH MEALS
Status: DISCONTINUED | OUTPATIENT
Start: 2021-02-08 | End: 2021-02-10 | Stop reason: HOSPADM

## 2021-02-08 RX ORDER — NIFEDIPINE 30 MG/1
90 TABLET, EXTENDED RELEASE ORAL DAILY
Status: CANCELLED | OUTPATIENT
Start: 2021-02-08

## 2021-02-08 RX ORDER — FOLIC ACID/VIT B COMPLEX AND C 0.8 MG
1 TABLET ORAL DAILY
COMMUNITY
End: 2022-02-08

## 2021-02-08 RX ORDER — ASPIRIN 81 MG/1
81 TABLET, CHEWABLE ORAL EVERY MORNING
Status: DISCONTINUED | OUTPATIENT
Start: 2021-02-08 | End: 2021-02-10 | Stop reason: HOSPADM

## 2021-02-08 RX ORDER — METOCLOPRAMIDE HYDROCHLORIDE 5 MG/ML
5 INJECTION INTRAMUSCULAR; INTRAVENOUS
Status: DISCONTINUED | OUTPATIENT
Start: 2021-02-08 | End: 2021-02-10 | Stop reason: HOSPADM

## 2021-02-08 RX ORDER — ONDANSETRON 2 MG/ML
4 INJECTION INTRAMUSCULAR; INTRAVENOUS EVERY 4 HOURS PRN
Status: DISCONTINUED | OUTPATIENT
Start: 2021-02-08 | End: 2021-02-10 | Stop reason: HOSPADM

## 2021-02-08 RX ORDER — AZATHIOPRINE 50 MG/1
50 TABLET ORAL EVERY MORNING
Status: CANCELLED | OUTPATIENT
Start: 2021-02-08

## 2021-02-08 RX ORDER — METOPROLOL SUCCINATE 25 MG/1
25 TABLET, EXTENDED RELEASE ORAL 2 TIMES DAILY
Status: DISCONTINUED | OUTPATIENT
Start: 2021-02-08 | End: 2021-02-08

## 2021-02-08 RX ORDER — HYDRALAZINE HYDROCHLORIDE 25 MG/1
50 TABLET, FILM COATED ORAL EVERY 8 HOURS SCHEDULED
Status: DISCONTINUED | OUTPATIENT
Start: 2021-02-08 | End: 2021-02-10 | Stop reason: HOSPADM

## 2021-02-08 RX ORDER — ALBUTEROL SULFATE 2.5 MG/3ML
2.5 SOLUTION RESPIRATORY (INHALATION) EVERY 6 HOURS PRN
Status: DISCONTINUED | OUTPATIENT
Start: 2021-02-08 | End: 2021-02-10 | Stop reason: HOSPADM

## 2021-02-08 RX ORDER — CEFEPIME HYDROCHLORIDE 1 G/50ML
1000 INJECTION, SOLUTION INTRAVENOUS EVERY 24 HOURS
Status: DISCONTINUED | OUTPATIENT
Start: 2021-02-09 | End: 2021-02-08

## 2021-02-08 RX ADMIN — INSULIN LISPRO 1 UNITS: 100 INJECTION, SOLUTION INTRAVENOUS; SUBCUTANEOUS at 16:41

## 2021-02-08 RX ADMIN — VANCOMYCIN HYDROCHLORIDE 750 MG: 5 INJECTION, POWDER, LYOPHILIZED, FOR SOLUTION INTRAVENOUS at 08:07

## 2021-02-08 RX ADMIN — ALBUTEROL SULFATE 10 MG: 2.5 SOLUTION RESPIRATORY (INHALATION) at 06:00

## 2021-02-08 RX ADMIN — HEPARIN SODIUM 5000 UNITS: 5000 INJECTION INTRAVENOUS; SUBCUTANEOUS at 13:39

## 2021-02-08 RX ADMIN — METOCLOPRAMIDE HYDROCHLORIDE 5 MG: 5 INJECTION INTRAMUSCULAR; INTRAVENOUS at 17:03

## 2021-02-08 RX ADMIN — ONDANSETRON 4 MG: 2 INJECTION INTRAMUSCULAR; INTRAVENOUS at 10:59

## 2021-02-08 RX ADMIN — LABETALOL HYDROCHLORIDE 10 MG: 5 INJECTION, SOLUTION INTRAVENOUS at 20:27

## 2021-02-08 RX ADMIN — INSULIN DETEMIR 4 UNITS: 100 INJECTION, SOLUTION SUBCUTANEOUS at 21:46

## 2021-02-08 RX ADMIN — HEPARIN SODIUM 5000 UNITS: 5000 INJECTION INTRAVENOUS; SUBCUTANEOUS at 21:46

## 2021-02-08 RX ADMIN — INSULIN LISPRO 2 UNITS: 100 INJECTION, SOLUTION INTRAVENOUS; SUBCUTANEOUS at 12:21

## 2021-02-08 RX ADMIN — CEFEPIME HYDROCHLORIDE 2000 MG: 2 INJECTION, SOLUTION INTRAVENOUS at 06:55

## 2021-02-08 RX ADMIN — INSULIN DETEMIR 8 UNITS: 100 INJECTION, SOLUTION SUBCUTANEOUS at 12:20

## 2021-02-08 RX ADMIN — HYDRALAZINE HYDROCHLORIDE 50 MG: 25 TABLET, FILM COATED ORAL at 21:42

## 2021-02-08 RX ADMIN — METOCLOPRAMIDE HYDROCHLORIDE 5 MG: 5 INJECTION INTRAMUSCULAR; INTRAVENOUS at 12:41

## 2021-02-08 RX ADMIN — ATORVASTATIN CALCIUM 80 MG: 40 TABLET, FILM COATED ORAL at 21:42

## 2021-02-08 RX ADMIN — TACROLIMUS 1 MG: 1 CAPSULE ORAL at 21:42

## 2021-02-08 NOTE — PLAN OF CARE
Pt for 3 5hr HD treatment today  Right upper arm fistula cannulated with #16g needles x2 without difficulty  BFR to 350  #16g needles used for treatment  UF 3kg as tolerated  Using 2K+ bath for treatment        Problem: METABOLIC, FLUID AND ELECTROLYTES - ADULT  Goal: Electrolytes maintained within normal limits  Description: INTERVENTIONS:  - Monitor labs and assess patient for signs and symptoms of electrolyte imbalances  - Administer electrolyte replacement as ordered  - Monitor response to electrolyte replacements, including repeat lab results as appropriate  - Instruct patient on fluid and nutrition as appropriate  - Dialysis as ordered  Outcome: Progressing  Goal: Fluid balance maintained  Description: INTERVENTIONS:  - Monitor labs   - Monitor I/O and WT  - Instruct patient on fluid and nutrition as appropriate  - Assess for signs & symptoms of volume excess or deficit  -Dialysis as ordered  Outcome: Progressing

## 2021-02-08 NOTE — ED PROVIDER NOTES
History  Chief Complaint   Patient presents with    Shortness of Breath     increased SOB since this am, had dialysis M/W/F feels he needs his treatment  HPI     Pt presents from home, hx of CAD, CKD on HD (M,W,F), HTN, HLD, c/o increasing sob, MARSH which began early this morning awakening the patient from sleep  Upon arrival to his house, the patient was in respiratory distress and was placed on CPAP by EMS  He is improving here in the ED and states that he feels better  Pt denies ha, fevers, cough, cp, n/v/d/c, abd pain, dysuria, focal def or syncope  Prior to Admission Medications   Prescriptions Last Dose Informant Patient Reported? Taking?    B Complex-C-Folic Acid (Nela-Juan Jose) TABS 2/7/2021 at Unknown time  Yes Yes   Sig: Take 1 tablet by mouth daily   Lancets (ONETOUCH DELICA PLUS [id number]) MISC  Self Yes No   NIFEdipine (PROCARDIA XL) 90 mg 24 hr tablet 2/7/2021 at Unknown time  No Yes   Sig: Take 1 tablet (90 mg total) by mouth daily   ONE TOUCH ULTRA TEST test strip  Self No No   Sig: CHECK BLOOD SUGAR 5 TO 6 TIMES DAILY   albuterol (2 5 mg/3 mL) 0 083 % nebulizer solution  Self Yes No   Sig: Inhale 2 5 mg every 4 (four) hours as needed    aspirin 81 mg chewable tablet 2/7/2021 at Unknown time Self Yes Yes   Sig: Chew 81 mg every morning    atorvastatin (LIPITOR) 80 mg tablet 2/7/2021 at Unknown time Self Yes Yes   Sig: Take 80 mg by mouth every morning    azaTHIOprine (IMURAN) 50 mg tablet 2/7/2021 at Unknown time  No Yes   Sig: Take 1 tablet (50 mg total) by mouth every morning   carvedilol (COREG) 6 25 mg tablet 2/7/2021 at Unknown time  No Yes   Sig: Take 1 tablet (6 25 mg total) by mouth 2 (two) times a day with meals   ergocalciferol (ERGOCALCIFEROL) 1 25 MG (49892 UT) capsule Past Week at Unknown time  No Yes   Sig: Take 1 capsule (50,000 Units total) by mouth once a week   glucagon (GLUCAGON EMERGENCY) 1 MG injection  Self No No   Sig: Inject 1 mg under the skin once as needed for low blood sugar for up to 1 dose   glucose blood (OneTouch Ultra) test strip   No No   Si-6 times daily   hydrALAZINE (APRESOLINE) 50 mg tablet 2021 at Unknown time  Yes Yes   Sig: Take 50 mg by mouth 3 (three) times a day   insulin detemir (LEVEMIR) 100 units/mL subcutaneous injection 2021 at Unknown time  No Yes   Sig: Inject 8 Units under the skin 2 (two) times a day   Patient taking differently: Inject 10 Units under the skin 2 (two) times a day    insulin lispro (HumaLOG) 100 units/mL injection   No No   Sig: Inject 10 Units under the skin 3 (three) times a day before meals   Patient taking differently: Inject 10 Units under the skin 3 (three) times a day as needed    isosorbide mononitrate (IMDUR) 30 mg 24 hr tablet 2021 at Unknown time  No Yes   Sig: Take 1 tablet (30 mg total) by mouth daily   metoprolol succinate (TOPROL-XL) 25 mg 24 hr tablet 2021 at Unknown time  No Yes   Sig: Take 1 tablet (25 mg total) by mouth 2 (two) times a day   ondansetron (ZOFRAN-ODT) 4 mg disintegrating tablet  Self No No   Sig: Take 1 tablet (4 mg total) by mouth every 6 (six) hours as needed for vomiting   pantoprazole (PROTONIX) 40 mg tablet 2021 at Unknown time  No Yes   Sig: Take 1 tablet (40 mg total) by mouth daily   predniSONE 5 mg tablet 2021 at Unknown time  No Yes   Sig: Take 1 tablet (5 mg total) by mouth daily   sodium polystyrene sulfonate (KAYEXALATE) 15 g/60 mL suspension   No No   Sig: Use as needed daily for elevated potasium level   tacrolimus (PROGRAF) 1 mg capsule   No No   Sig: Take 1 capsule (1 mg total) by mouth 3 (three) times a day 0800, 1400, 2000   Patient taking differently: Take 1 mg by mouth every 12 (twelve) hours 2 tabs in am, 1 tab in pm      Facility-Administered Medications: None       Past Medical History:   Diagnosis Date    Bacteremia 2018    CAD (coronary artery disease)     s/p MARC to LCx, pLAD 2018    Cardiac arrest (HCC)     Chronic kidney disease     Diabetes mellitus type 1 ([facility] )     [facility])     Access in right chest    GI bleed     Hyperlipidemia     Hypertension     Infection at site of external fixator pin ([facility] )     MI (myocardial infarction) ([facility] )     Pneumonia     Last Assessed [id number]    Renal failure     Renal transplant, status post 07/21/2007       Past Surgical History:   Procedure Laterality Date    AMPUTATION Right 02/2019    aboce knee    ANKLE FRACTURE SURGERY      ANKLE HARDWARE REMOVAL Right 7/31/2017    Procedure: REMOVAL HARDWARE ANKLE;  Surgeon: Jaylon De La Garza MD;  [facility];  Service: Orthopedics    ANKLE HARDWARE REMOVAL Right 8/17/2017    Procedure: TIBIA FAILED HARDWARE REMOVAL;  Surgeon: Jaylon De La Garza MD;  [facility]: [facility];  Service: Orthopedics    CLOSED REDUCTION ANKLE Right 7/3/2017    Procedure: CLOSED REDUCTION DISTAL TIB-FIB AND CASTING VS;  Surgeon: Jaylon De La Garza MD;  [facility];  Service: Orthopedics    CORONARY ANGIOPLASTY WITH STENT PLACEMENT  02/2018    CAD s/p MARC to LCx, pLAD    ESOPHAGOGASTRODUODENOSCOPY N/A 12/20/2018    Procedure: ESOPHAGOGASTRODUODENOSCOPY (EGD) in ICU; Surgeon: Dhiraj Garcia MD;  Location: [facility];   Service: Gastroenterology    EYE SURGERY      FRACTURE SURGERY      ORIF Rt Ankle    GLUTAMIC ACID DECARBOXYLASE (HISTORICAL)      IR AV FISTULAGRAM/GRAFTOGRAM  4/16/2020    IR PICC LINE  8/20/2018    IR TUNNELED DIALYSIS CATHETER CHECK/CHANGE/REPOSITION/ANGIOPLASTY  1/8/2020    IR TUNNELED DIALYSIS CATHETER PLACEMENT  10/21/2019    IR TUNNELED DIALYSIS CATHETER REMOVAL  5/29/2020    IR VENOUS LINE REMOVAL  10/5/2018    LEG AMPUTATION Right 02/01/2019    Above the knee    NEPHRECTOMY TRANSPLANTED ORGAN      LA ANASTOMOSIS,AV,ANY SITE Right 2/11/2020    Procedure: CREATION FISTULA ARTERIOVENOUS (AV) right upper extremity;  Surgeon: Bella Bill MD;  Location: [facility] MAIN OR;  Service: Vascular    LA OPEN TREATMENT FRACTURE DISTAL TIBIA FIBULA Right 7/3/2017    Procedure: OPEN REDUCTION W/ INTERNAL FIXATION (ORIF); Surgeon: Shelley Cruz MD;  [facility]: [facility];  Service: Orthopedics    TOE AMPUTATION Right 10/27/2016    Procedure: AMPUTATION TOE;  Surgeon: Shivani Gaviria DPM;  Location: [facility];  Service:        Family History   Problem Relation Age of Onset    Diabetes Brother     Coronary artery disease Mother     No Known Problems Father      I have reviewed and agree with the history as documented  E-Cigarette/Vaping    E-Cigarette Use Never User      E-Cigarette/Vaping Substances    Nicotine No     THC No     CBD No     Flavoring No     Other No     Unknown No      Social History     Tobacco Use    Smoking status: Never Smoker    Smokeless tobacco: Never Used   Substance Use Topics    Alcohol use: Not Currently     Alcohol/week: 0 0 standard drinks     Frequency: Never     Binge frequency: Never    Drug use: Never       Review of Systems   Constitutional: Negative for activity change, appetite change and fever  HENT: Negative for congestion, nosebleeds and sore throat  Eyes: Negative for photophobia and discharge  Respiratory: Positive for shortness of breath  Negative for cough, wheezing and stridor  Cardiovascular: Negative for chest pain  Gastrointestinal: Negative for abdominal pain, constipation, diarrhea, nausea and vomiting  Endocrine: Negative for cold intolerance and polydipsia  Genitourinary: Negative for discharge, hematuria and penile pain  Musculoskeletal: Negative for arthralgias, myalgias and neck stiffness  Skin: Negative for color change and rash  Allergic/Immunologic: Negative for immunocompromised state  Neurological: Negative for dizziness, seizures and headaches  Hematological: Negative for adenopathy  Psychiatric/Behavioral: Negative for confusion and self-injury  The patient is not nervous/anxious          Physical Exam  Physical Exam  Vitals signs and nursing note reviewed  Constitutional:       General: He is not in acute distress  Appearance: He is well-developed  He is not diaphoretic  HENT:      Head: Normocephalic and atraumatic  Right Ear: External ear normal       Left Ear: External ear normal       Nose: Nose normal       Mouth/Throat:      Pharynx: No oropharyngeal exudate  Eyes:      General: No scleral icterus  Right eye: No discharge  Left eye: No discharge  Conjunctiva/sclera: Conjunctivae normal       Pupils: Pupils are equal, round, and reactive to light  Neck:      Musculoskeletal: Normal range of motion and neck supple  Thyroid: No thyromegaly  Vascular: No JVD  Trachea: No tracheal deviation  Cardiovascular:      Rate and Rhythm: Normal rate and regular rhythm  Heart sounds: Normal heart sounds  No murmur  No friction rub  No gallop  Pulmonary:      Effort: Respiratory distress present  Breath sounds: No stridor  Rales present  No wheezing  Comments: Increased work of breathing with course breath sounds throughout  Crackles throughout  Speaking in 1 to 2 word sentences  Currently on CPAP  Chest:      Chest wall: No tenderness  Abdominal:      General: Bowel sounds are normal  There is no distension  Palpations: Abdomen is soft  There is no mass  Tenderness: There is no abdominal tenderness  There is no guarding or rebound  Musculoskeletal: Normal range of motion  General: No tenderness or deformity  Lymphadenopathy:      Cervical: No cervical adenopathy  Skin:     General: Skin is warm and dry  Coloration: Skin is not pale  Findings: No erythema or rash  Neurological:      Mental Status: He is alert and oriented to person, place, and time  Cranial Nerves: No cranial nerve deficit  Motor: No abnormal muscle tone  Coordination: Coordination normal       Deep Tendon Reflexes: Reflexes are normal and symmetric   Reflexes normal  Psychiatric:         Behavior: Behavior normal          Thought Content: Thought content normal          Judgment: Judgment normal          Vital Signs  ED Triage Vitals   Temperature Pulse Respirations Blood Pressure SpO2   02/08/21 0551 02/08/21 0551 02/08/21 0551 02/08/21 0551 02/08/21 0551   98 1 °F (36 7 °C) 77 (!) 24 (!) 231/98 95 %      Temp Source Heart Rate Source Patient Position - Orthostatic VS BP Location FiO2 (%)   02/08/21 0551 02/08/21 0551 02/08/21 0551 02/08/21 0551 02/08/21 0551   Temporal Monitor Sitting Left arm 70      Pain Score       02/08/21 1005       No Pain           Vitals:    02/10/21 1345 02/10/21 1400 02/10/21 1415 02/10/21 1430   BP: 97/62 (!) 88/54 (!) 88/51 121/57   Pulse: 75 74 74 77   Patient Position - Orthostatic VS:    Lying         Visual Acuity      ED Medications  Medications   albuterol inhalation solution 10 mg (10 mg Nebulization Given 2/8/21 0600)   cefepime (MAXIPIME) IVPB (premix in dextrose) 2,000 mg 50 mL (0 mg Intravenous Stopped 2/8/21 0805)   vancomycin 750 mg in sodium chloride 0 9% 250 mL (0 mg/kg × 54 kg Intravenous Stopped 2/8/21 0931)   dextrose 50 % IV solution 25 mL (25 mL Intravenous Given 2/9/21 0556)       Diagnostic Studies  Results Reviewed     Procedure Component Value Units Date/Time    Blood culture [[id number]] Collected: 02/08/21 0604    Lab Status: Preliminary result Specimen: Blood from Arm, Left Updated: 02/10/21 1201     Blood Culture No Growth at 48 hrs  Blood culture [[id number]] Collected: 02/08/21 0604    Lab Status: Preliminary result Specimen: Blood from Hand, Left Updated: 02/10/21 1201     Blood Culture No Growth at 48 hrs      Procalcitonin with AM Reflex [[id number]]  (Abnormal) Collected: 02/08/21 0554    Lab Status: Final result Specimen: Blood Updated: 02/08/21 1651     Procalcitonin 0 58 ng/ml     Strep Pneumoniae, Urine [[id number]]     Lab Status: No result Specimen: Urine     Legionella antigen, urine [[id number]]     Lab Status: No result Specimen: Urine     COVID19, Influenza A/B, RSV PCR, [facility] [[id number]]  (Normal) Collected: 02/08/21 0727    Lab Status: Final result Specimen: Nares from Nasopharyngeal Swab Updated: 02/08/21 0821     SARS-CoV-2 Negative     INFLUENZA A PCR Negative     INFLUENZA B PCR Negative     RSV PCR Negative    Narrative: This test has been authorized by FDA under an EUA (Emergency Use Assay) for use by authorized laboratories  Clinical caution and judgement should be used with the interpretation of these results with consideration of the clinical impression and other laboratory testing  Testing reported as "Positive" or "Negative" has been proven to be accurate according to standard laboratory validation requirements  All testing is performed with control materials showing appropriate reactivity at standard intervals      Blood gas, arterial [[id number]]  (Abnormal) Collected: 02/08/21 0755    Lab Status: Final result Specimen: Blood, Arterial from Radial, Left Updated: 02/08/21 0801     pH, Arterial 7 363     pCO2, Arterial 35 7 mm Hg      pO2, Arterial 132 1 mm Hg      HCO3, Arterial 19 9 mmol/L      Base Excess, Arterial -4 9 mmol/L      O2 Content, Arterial 16 7 mL/dL      O2 HGB,Arterial  97 8 %      SOURCE Radial, Left     AUBREY TEST Yes     Non Vent type BIPAP BIPAP     IPAP 18     EPAP 6     BIPAP fio2 70 %     Blood gas, venous [[id number]]  (Abnormal) Collected: 02/08/21 0727    Lab Status: Final result Specimen: Blood from Arm, Left Updated: 02/08/21 0735     pH, Mio 7 338     pCO2, Mio 39 5 mm Hg      pO2, Mio 78 7 mm Hg      HCO3, Mio 20 7 mmol/L      Base Excess, Mio -4 7 mmol/L      O2 Content, Mio 15 4 ml/dL      O2 HGB, VENOUS 92 1 %     NT-BNP PRO [[id number]]  (Abnormal) Collected: 02/08/21 0554    Lab Status: Final result Specimen: Blood from Arm, Left Updated: 02/08/21 0625     NT-proBNP 24,494 pg/mL     Lipase [[id number]]  (Abnormal) Collected: 02/08/21 0554    Lab Status: Final result Specimen: Blood from Arm, Left Updated: 02/08/21 0625     Lipase 40 u/L     Magnesium [[id number]]  (Abnormal) Collected: 02/08/21 0554    Lab Status: Final result Specimen: Blood from Arm, Left Updated: 02/08/21 0624     Magnesium 3 0 mg/dL     Troponin I [[id number]]  (Normal) Collected: 02/08/21 0554    Lab Status: Final result Specimen: Blood from Arm, Left Updated: 02/08/21 0622     Troponin I <0 02 ng/mL     Lactic acid, plasma [[id number]]  (Normal) Collected: 02/08/21 0554    Lab Status: Final result Specimen: Blood from Arm, Left Updated: 02/08/21 8563     LACTIC ACID 1 4 mmol/L     Narrative:      Result may be elevated if tourniquet was used during collection      Comprehensive metabolic panel [[id number]]  (Abnormal) Collected: 02/08/21 0554    Lab Status: Final result Specimen: Blood from Arm, Left Updated: 02/08/21 4316     Sodium 139 mmol/L      Potassium 5 4 mmol/L      Chloride 102 mmol/L      CO2 21 mmol/L      ANION GAP 16 mmol/L      BUN 71 mg/dL      Creatinine 7 65 mg/dL      Glucose 239 mg/dL      Calcium 9 7 mg/dL      AST 12 U/L      ALT 25 U/L      Alkaline Phosphatase 140 U/L      Total Protein 8 0 g/dL      Albumin 3 8 g/dL      Total Bilirubin 0 90 mg/dL      eGFR 7 ml/min/1 73sq m     Narrative:      [place] guidelines for Chronic Kidney Disease (CKD):     Stage 1 with normal or high GFR (GFR > 90 mL/min/1 73 square meters)    Stage 2 Mild CKD (GFR = 60-89 mL/min/1 73 square meters)    Stage 3A Moderate CKD (GFR = 45-59 mL/min/1 73 square meters)    Stage 3B Moderate CKD (GFR = 30-44 mL/min/1 73 square meters)    Stage 4 Severe CKD (GFR = 15-29 mL/min/1 73 square meters)    Stage 5 End Stage CKD (GFR <15 mL/min/1 73 square meters)  Note: GFR calculation is accurate only with a steady state creatinine    Protime-INR [[id number]]  (Normal) Collected: 02/08/21 0554    Lab Status: Final result Specimen: Blood from Arm, Left Updated: 02/08/21 9545 Protime 13 1 seconds      INR 1 01    APTT [[id number]]  (Abnormal) Collected: 02/08/21 0554    Lab Status: Final result Specimen: Blood from Arm, Left Updated: 02/08/21 0619     PTT 43 seconds     CBC and differential [[id number]]  (Abnormal) Collected: 02/08/21 0554    Lab Status: Final result Specimen: Blood from Arm, Left Updated: 02/08/21 0605     WBC 12 01 Thousand/uL      RBC 3 95 Million/uL      Hemoglobin 12 9 g/dL      Hematocrit 39 9 %       fL      MCH 32 7 pg      MCHC 32 3 g/dL      RDW 13 8 %      MPV 8 3 fL      Platelets 992 Thousands/uL      nRBC 0 /100 WBCs      Neutrophils Relative 74 %      Immat GRANS % 0 %      Lymphocytes Relative 16 %      Monocytes Relative 6 %      Eosinophils Relative 3 %      Basophils Relative 1 %      Neutrophils Absolute 9 02 Thousands/µL      Immature Grans Absolute 0 05 Thousand/uL      Lymphocytes Absolute 1 88 Thousands/µL      Monocytes Absolute 0 67 Thousand/µL      Eosinophils Absolute 0 30 Thousand/µL      Basophils Absolute 0 09 Thousands/µL              EKG: normal sinus rhythm, non-specific t-wave abnormality, no acute ischemia    XR chest portable   Final Result by Meaghan Javed MD (02/09 5318)      Near-complete resolution of pulmonary edema            Workstation performed: [id number]         CT chest wo contrast   Final Result by Meaghan Javed MD (02/08 7230)      Severe bilateral consolidation, greater on the right, with trace effusions  This is likely due to asymmetric pulmonary edema, given the history of dialysis and no signs of infection  Trace perihepatic ascites  Diffuse bone sclerosis related to renal osteodystrophy  Workstation performed: [id number]         X-ray chest 1 view portable   Final Result by Meaghan Javed MD (02/08 0451)      Pulmonary edema with severe right pneumonia                    Workstation performed: [id number]                    Procedures  CriticalCare Time  Performed by: Volodymyr Barber   Authorized by: Derek Acosta DO     Critical care provider statement:     Critical care time (minutes):  48    Critical care start time:  2/8/2021 5:51 AM    Critical care end time:  2/8/2021 6:53 AM    Critical care time was exclusive of:  Separately billable procedures and treating other patients    Critical care was necessary to treat or prevent imminent or life-threatening deterioration of the following conditions:  Respiratory failure    Critical care was time spent personally by me on the following activities:  Obtaining history from patient or surrogate, ordering and performing treatments and interventions, development of treatment plan with patient or surrogate, ordering and review of laboratory studies, ordering and review of radiographic studies, re-evaluation of patient's condition, evaluation of patient's response to treatment, review of old charts, examination of patient and ventilator management  Comments:      Patient appears such that there is high probability of imminent or life threatening deterioration in the patient's condition and that the failure to initiate these interventions on an urgent basis would likely result in sudden, clinically significant or life threatening deterioration in the patient's condition               ED Course       6:14 AM - Pt is improved from prior  His bipap settings are IP 18, PEEP 6, RR12, O2 70%  Pt will likely need ICU or step-down  We do not have a bed available here so will need to transfer the patient for an available bed   6:49 AM - I was just informed that a bed is available here for TR05  Awaiting results of testing and will admit to medicine at our hospital   7:17 AM - I spoke with the hospitalist who admit the pt from the ED  SBIRT 20yo+      Most Recent Value   SBIRT (24 yo +)   In order to provide better care to our patients, we are screening all of our patients for alcohol and drug use   Would it be okay to ask you these screening questions? Yes Filed at: 02/08/2021 7342   Initial Alcohol Screen: US AUDIT-C    1  How often do you have a drink containing alcohol?  0 Filed at: 02/08/2021 0558   2  How many drinks containing alcohol do you have on a typical day you are drinking? 0 Filed at: 02/08/2021 0558   3a  Male UNDER 65: How often do you have five or more drinks on one occasion? 0 Filed at: 02/08/2021 0558   3b  FEMALE Any Age, or MALE 65+: How often do you have 4 or more drinks on one occassion? 0 Filed at: 02/08/2021 0558   Audit-C Score  0 Filed at: 02/08/2021 8721   ADRIEL: How many times in the past year have you    Used an illegal drug or used a prescription medication for non-medical reasons? Never Filed at: 02/08/2021 5247                    MDM  Number of Diagnoses or Management Options  Diagnosis management comments: IMP: volume overload vs pneumonia, CHF  Doubt acs, pe, dissection, tamponade, cva, bacteremia, surgical abd process  Plan: cardiac labs, ekg, cxr, give duoneb prn   - ekg no acute ischemia  - cxr shows large right sided infiltrate  - hyperkalemia 5 4  - Mg 3 0  - BNP 11370  - Pt with respiratory distress now improved on bipap         Amount and/or Complexity of Data Reviewed  Clinical lab tests: ordered and reviewed  Tests in the radiology section of CPT®: ordered and reviewed  Tests in the medicine section of CPT®: ordered and reviewed  Decide to obtain previous medical records or to obtain history from someone other than the patient: yes  Obtain history from someone other than the patient: yes (EMS provider)  Review and summarize past medical records: yes  Independent visualization of images, tracings, or specimens: yes    Risk of Complications, Morbidity, and/or Mortality  Presenting problems: high  Diagnostic procedures: high  Management options: high    Patient Progress  Patient progress: stable      Disposition  Final diagnoses:   Dyspnea and respiratory abnormality     Time reflects when diagnosis was documented in both MDM as applicable and the Disposition within this note     Time User Action Codes Description Comment    2/8/2021  7:16 AM Fernando Sanford Add [J96 00] Acute respiratory failure, unspecified whether with hypoxia or hypercapnia ([facility] )     2/8/2021  7:16 AM Fernando Sanford Modify [J96 00] Acute respiratory failure, unspecified whether with hypoxia or hypercapnia ([facility] )     2/8/2021  7:18 AM Fernando Sanford Add [N18 6,  Z99 2] ESRD (end stage renal disease) on dialysis ([place] [address] )     2/8/2021  7:19 AM Corky Creed Add [R06 00,  R06 89] Dyspnea and respiratory abnormality     2/10/2021  9:04 AM Fernando Sanford [Z94 0] Renal transplant, status post     2/10/2021  4:32 PM Jess Kinsey Add [I50 33] Acute on chronic diastolic CHF (congestive heart failure) ([place] [address] )     2/10/2021  4:32 PM eFrnando Sanford Add [I34 0] Mitral valve insufficiency, unspecified etiology       ED Disposition     ED Disposition Condition Date/Time Comment    Admit Stable Mon Feb 8, 2021  7:19 AM Case was discussed with Dr Jackelyn Rust and the patient's admission status was agreed to be Admission Status: inpatient status to the service of Dr Jackelyn Rust  Follow-up Information     Follow up With Specialties Details Why Contact Info    Piedad Davenport DO Family Medicine Call in 1 week(s)  [address]      Lindsay Juárez,  Nephrology Call in 1 week(s)  [address], [place] [address] [phone]            Discharge Medication List as of 2/10/2021  2:54 PM      CONTINUE these medications which have CHANGED    Details   !! tacrolimus (PROGRAF) 1 mg capsule Take 2 capsules (2 mg total) by mouth daily, Starting Thu 2/11/2021, No Print      !! tacrolimus (PROGRAF) 1 mg capsule Take 1 capsule (1 mg total) by mouth daily at bedtime, Starting Wed 2/10/2021, No Print       !! - Potential duplicate medications found  Please discuss with provider  CONTINUE these medications which have NOT CHANGED    Details   albuterol (2 5 mg/3 mL) 0 083 % nebulizer solution Inhale 2 5 mg every 4 (four) hours as needed , Starting Fri 2/8/2019, Historical Med      aspirin 81 mg chewable tablet Chew 81 mg every morning , Historical Med      atorvastatin (LIPITOR) 80 mg tablet Take 80 mg by mouth every morning , Historical Med      azaTHIOprine (IMURAN) 50 mg tablet Take 1 tablet (50 mg total) by mouth every morning, Starting Tue 8/18/2020, Normal      B Complex-C-Folic Acid (Nela-Juan Jose) TABS Take 1 tablet by mouth daily, Historical Med      carvedilol (COREG) 6 25 mg tablet Take 1 tablet (6 25 mg total) by mouth 2 (two) times a day with meals, Starting Thu 12/10/2020, Normal      ergocalciferol (ERGOCALCIFEROL) 1 25 MG (55021 UT) capsule Take 1 capsule (50,000 Units total) by mouth once a week, Starting Tue 12/29/2020, Normal      glucagon (GLUCAGON EMERGENCY) 1 MG injection Inject 1 mg under the skin once as needed for low blood sugar for up to 1 dose, Starting Fri 10/18/2019, Normal      !! glucose blood (OneTouch Ultra) test strip 5-6 times daily, Normal      hydrALAZINE (APRESOLINE) 50 mg tablet Take 50 mg by mouth 3 (three) times a day, Historical Med      insulin detemir (LEVEMIR) 100 units/mL subcutaneous injection Inject 8 Units under the skin 2 (two) times a day, Starting Tue 12/29/2020, Normal      insulin lispro (HumaLOG) 100 units/mL injection Inject 10 Units under the skin 3 (three) times a day before meals, Starting Tue 12/29/2020, Normal      isosorbide mononitrate (IMDUR) 30 mg 24 hr tablet Take 1 tablet (30 mg total) by mouth daily, Starting Tue 9/1/2020, Normal      Lancets (Darci Lush) MISC Starting Tue 11/19/2019, Historical Med      NIFEdipine (PROCARDIA XL) 90 mg 24 hr tablet Take 1 tablet (90 mg total) by mouth daily, Starting Tue 8/18/2020, Normal      ondansetron (ZOFRAN-ODT) 4 mg disintegrating tablet Take 1 tablet (4 mg total) by mouth every 6 (six) hours as needed for vomiting, Starting Tue 6/9/2020, Print      !! ONE TOUCH ULTRA TEST test strip CHECK BLOOD SUGAR 5 TO 6 TIMES DAILY, Normal      pantoprazole (PROTONIX) 40 mg tablet Take 1 tablet (40 mg total) by mouth daily, Starting Tue 8/18/2020, Normal      predniSONE 5 mg tablet Take 1 tablet (5 mg total) by mouth daily, Starting Thu 10/22/2020, Normal      sodium polystyrene sulfonate (KAYEXALATE) 15 g/60 mL suspension Use as needed daily for elevated potasium level, Normal       !! - Potential duplicate medications found  Please discuss with provider  STOP taking these medications       metoprolol succinate (TOPROL-XL) 25 mg 24 hr tablet Comments:   Reason for Stopping:             Outpatient Discharge Orders   Ambulatory referral to Cardiology   Standing Status: Future Standing Exp   Date: 02/10/22      Discharge Diet     Activity as tolerated     Call provider for:  persistent nausea or vomiting     Call provider for:  severe uncontrolled pain     Call provider for:  redness, tenderness, or signs of infection (pain, swelling, redness, odor or green/yellow discharge around incision site)     Call provider for: active or persistent bleeding     Call provider for:  difficulty breathing, headache or visual disturbances     Call provider for:  hives     Call provider for:  persistent dizziness or light-headedness     Call provider for:  extreme fatigue     Call provider for:       PDMP Review     None          ED Provider  Electronically Signed by           Hartley Blizzard, DO  02/11/21 9818

## 2021-02-08 NOTE — ASSESSMENT & PLAN NOTE
BP severely elevated on arrival with systolic 762C, trending down gradually  · Continue Home medication Hydralazine 50 mg PO Q8H  · BP monitoring

## 2021-02-08 NOTE — RESPIRATORY THERAPY NOTE
RT Protocol Note  Jose Daniel Flowers 45 y o  male MRN: [id number]  Unit/Bed#:  Encounter: [id number]    Assessment    Principal Problem:    Acute respiratory failure ([facility] )  Active Problems:    Type 1 diabetes mellitus ([facility] )    Renal transplant, status post    Hyperkalemia    Hypertension    Acute pulmonary edema (HCC)    ESRD (end stage renal disease) on dialysis (HCC)    Dyspnea and respiratory abnormality    Community acquired pneumonia of right lung      Home Pulmonary Medications:  Albuterol neb PRN       Past Medical History:   Diagnosis Date    Bacteremia 12/21/2018    CAD (coronary artery disease)     s/p MARC to LCx, pLAD 2/2018    Cardiac arrest ([place] [address] )     Chronic kidney disease     Diabetes mellitus type 1 ([place] [address] )     Dialysis patient ([place] [address] )     Access in right chest    GI bleed     Hyperlipidemia     Hypertension     Infection at site of external fixator pin ([facility] )     MI (myocardial infarction) ([place] [address] )     Pneumonia     Last Assessed [id number]    Renal failure     Renal transplant, status post 07/21/2007     Social History     Socioeconomic History    Marital status: /Civil Union     Spouse name: None    Number of children: None    Years of education: None    Highest education level: None   Occupational History    None   Social Needs    Financial resource strain: None    Food insecurity     Worry: None     Inability: None    Transportation needs     Medical: None     Non-medical: None   Tobacco Use    Smoking status: Never Smoker    Smokeless tobacco: Never Used   Substance and Sexual Activity    Alcohol use: Not Currently     Alcohol/week: 0 0 standard drinks     Frequency: Never     Binge frequency: Never    Drug use: Never    Sexual activity: Yes     Partners: Female   Lifestyle    Physical activity     Days per week: None     Minutes per session: None    Stress: None   Relationships    Social connections     Talks on phone: None     Gets together: None     Attends Yazdanism service: None     Active member of club or organization: None     Attends meetings of clubs or organizations: None     Relationship status: None    Intimate partner violence     Fear of current or ex partner: None     Emotionally abused: None     Physically abused: None     Forced sexual activity: None   Other Topics Concern    None   Social History Narrative    No living will       Subjective     " When can I get this mask off?"    Objective    Physical Exam:   Assessment Type: Assess only  General Appearance: Awake, Alert  Respiratory Pattern: Tachypneic  Chest Assessment: Chest expansion symmetrical  Bilateral Breath Sounds: Diminished    Vitals:  Blood pressure 142/65, pulse (!) 52, temperature 98 2 °F (36 8 °C), temperature source Temporal, resp  rate (!) 30, height 5' 4" (1 626 m), weight 48 9 kg (107 lb 12 9 oz), SpO2 94 %  Results from last 7 days   Lab Units 02/08/21  0755   PH ART  7 363   PCO2 ART mm Hg 35 7*   PO2 ART mm Hg 132 1*   HCO3 ART mmol/L 19 9*   BASE EXC ART mmol/L -4 9   O2 CONTENT ART mL/dL 16 7   O2 HGB, ARTERIAL % 97 8*   ABG SOURCE  Radial, Left   AUBREY TEST  Yes       Imaging and other studies: I have personally reviewed pertinent reports  Plan    Respiratory Plan: Vent/NIV/HFNC, Home Bronchodilator Patient pathway         PRN albuterol for SOB/ Wheezing  Wean Bipap as pt tolerated to NC/ RA

## 2021-02-08 NOTE — RESPIRATORY THERAPY NOTE
Pt was placed on Vapotherm due to previously on Bipap but became very nauseated  Pt on 10L NC, which was increased by [facility] with SpO2 of 86%  Pt placed on Vapotherm of 25L and 50% SpO2 initially reading 91%, after exiting the room SpO2 decreased to 84%  Vapotherm increased to 30L and 75%  SpO2 increased and currently reading 93%  RN aware, will continue to monitor

## 2021-02-08 NOTE — ASSESSMENT & PLAN NOTE
Patient had right sided nephrectomy many years ago, started hemodialysis since Nov 2019  Patient is chronically on Azathioprine, Tacrolimus, and Prednisone

## 2021-02-08 NOTE — ASSESSMENT & PLAN NOTE
Patient presented with acute dyspnea while sleeping  Denied any chest pain, cough, fever, upper respiratory symptoms     · WBC 12 01, L A 1 4  · Procalcitonin, Blood culture x 2, Urine strep pneumoniae and legionella  · CXR: severe right sided pulmonary edema with infiltrate in right midlung and right base  · ED: Cefepime 2 g and Vancomycin 15 mg/kg x 1  · Continue with Rocephin 1g IV daily and Azithromycin 500 mg IV once daily   · COVID testing negative  · Respiratory protocol

## 2021-02-08 NOTE — ED NOTES
Per patients wife, patient had first dose of COVID vaccine on 01/19/2021     Shannan Reyes RN  02/08/21 7403

## 2021-02-08 NOTE — H&P
H&P- Ed Yates 1970, 45 y o  male MRN: [id number]    Unit/Bed#: Cathy Torres Encounter: [id number]    Primary Care Provider: Ector Lagos DO   Date and time admitted to hospital: 2021  5:53 AM        * Acute respiratory failure [facility])  Assessment & Plan  Patient reports short of breath started this morning around 4:00 a m  While sleeping  Baseline no supplemental oxygen at home  Placed on CPAP en route to ED  Placed on BiPAP at ED  · Likely secondary to pneumonia and diastolic dysfunction and ESRD on HD  · Continue BiPAP, /35 7/132  9/-4 9 on 18/70%, adjusted to /40%  · Pneumonia work up as noted below  · Respiratory protocol  · Incentive spirometry      Community acquired pneumonia of right lung  Assessment & Plan  Patient presented with acute dyspnea while sleeping  Denied any chest pain, cough, fever, upper respiratory symptoms  · WBC 12 01, L A 1 4  · Procalcitonin, Blood culture x 2, Urine strep pneumoniae and legionella  · CXR: severe right sided pulmonary edema with infiltrate in right midlung and right base  · ED: Cefepime 2 g and Vancomycin 15 mg/kg x 1  · Continue with Rocephin 1g IV daily and Azithromycin 500 mg IV once daily   · COVID testing negative  · Respiratory protocol    ESRD (end stage renal disease) on dialysis [facility])  Assessment & Plan  Lab Results   Component Value Date    EGFR 7 2021    EGFR 16 10/18/2020    EGFR 15 2020    CREATININE 7 65 (H) 2021    CREATININE 4 01 (H) 10/18/2020    CREATININE 4 40 (H) 2020     Patient is on HD M, W, F  Nephrology consult placed  Baseline Cr fluctuate between 3 5-7 4    Type 1 diabetes mellitus ([facility] )  Assessment & Plan  Lab Results   Component Value Date    HGBA1C 7 4 (H) 2020       No results for input(s): POCGLU in the last 72 hours      Blood Sugar Average: Last 72 hrs:     Home regimen: Insulin Levemir 10 Units BID and Lispro coverage as needed  Continue with Levemir 8 U BID and ISS coverage Alg #1  Niyah AC/HS    Renal transplant, status post  Assessment & Plan  Patient had right sided nephrectomy many years ago, started hemodialysis since Nov 2019  Patient is chronically on Azathioprine, Tacrolimus, and Prednisone     Hypertension  Assessment & Plan  BP severely elevated on arrival with systolic 395H, trending down gradually  · Continue Home medication Hydralazine 50 mg PO Q8H  · BP monitoring    Hyperkalemia  Assessment & Plan  K+ of 5 4 on admission, monitor BMP, if persist may utilize kayexalate       VTE Prophylaxis: Heparin  / sequential compression device   Code Status: Level I - Full code  POLST: POLST form is not discussed and not completed at this time  Discussion with family: Wife - Odette Kay [phone]    Anticipated Length of Stay:  Patient will be admitted on an Inpatient basis with an anticipated length of stay of  More than 2 midnights  Justification for Hospital Stay: Community acquired pneumonia, acute respiratory failure    Total Time for Visit, including Counseling / Coordination of Care: 30 minutes  Greater than 50% of this total time spent on direct patient counseling and coordination of care  Chief Complaint:   I cannot breath  History of Present Illness:    Jose Daniel Flowers is a 45 y o  male with past medical history CAD status post MARC to left circumflex and LAD, end-stage renal disease on hemodialysis Monday Wednesday Friday, hypertension, hyperlipidemia, type 1 diabetes, status post renal transplant with right nephrectomy in 2019, status post right above knee amputation who presents with acute shortness of breath started this morning around 4:00 a m  While sleeping  History taken from patient and wife Odette Kay at bedside  Patient does not require any baseline oxygen supplementation at home  Other than shortness of breath, patient denies any other symptoms    Denies any chest pain, dizziness, headache, malaise, cough, fever, chills, recent illness or sick contact  Patient usually sleeps supine with 1 pillow  His wife is a nursing staff at [facility] 5th floor nursing home with regular COVID testing negative test last week  Patient is compliant to his hemodialysis last received last Friday  He reports control sugar at home range 130-140s mg/dL  Denies any abdominal pain, N/V  Reports normal appetite  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  Patient was placed on BiPAP at the ED current setting IPAP 18 EPAP 6 PEEP 6 FiO2 40% saturating 94%  ED work up:  Chest x-ray showed pulmonary edema with severe right-sided pneumonia  BNP 24,494 (prior range 15,000- 30,000)  Troponin < 0 02  WBC 12 01  Cr  7 65 (baseline Cr fluctuates between 3 5-7 4)  Blood Cx x 2  /35 7/132  9/-4 9 on BiPAP 18/6/70%  Given Albuterol x 1, Cefepime 2 g x 1, Vancomycin 15mg/kg x 1    Review of Systems:    Review of Systems   Constitutional: Negative for chills, fatigue and fever  HENT: Negative for congestion and rhinorrhea  Respiratory: Positive for shortness of breath  Negative for cough  Cardiovascular: Negative for chest pain, palpitations and leg swelling  Gastrointestinal: Negative for abdominal pain, nausea and vomiting  Genitourinary: Negative for dysuria and flank pain  Musculoskeletal: Negative for back pain  Neurological: Negative for dizziness and headaches  Psychiatric/Behavioral: Negative for confusion         Past Medical and Surgical History:     Past Medical History:   Diagnosis Date    Bacteremia 2018    CAD (coronary artery disease)     s/p MARC to LCx, pLAD 2018    Cardiac arrest ([facility] )     Chronic kidney disease     Diabetes mellitus type 1 ([place] [address] )     Dialysis patient [facility])     Access in right chest    GI bleed     Hyperlipidemia     Hypertension     Infection at site of external fixator pin ([facility] )     MI (myocardial infarction) ([facility] )     Pneumonia     Last Assessed [id number]   Barb Drop Renal failure     Renal transplant, status post 07/21/2007       Past Surgical History:   Procedure Laterality Date    AMPUTATION Right 02/2019    aboce knee    ANKLE FRACTURE SURGERY      ANKLE HARDWARE REMOVAL Right 7/31/2017    Procedure: REMOVAL HARDWARE ANKLE;  Surgeon: Javier Loo MD;  Location: [facility];  Service: Orthopedics    ANKLE HARDWARE REMOVAL Right 8/17/2017    Procedure: TIBIA FAILED HARDWARE REMOVAL;  Surgeon: Javier Loo MD;  Location: [facility];  Service: Orthopedics    CLOSED REDUCTION ANKLE Right 7/3/2017    Procedure: CLOSED REDUCTION DISTAL TIB-FIB AND CASTING VS;  Surgeon: Javier Loo MD;  Location: [facility];  Service: Orthopedics    CORONARY ANGIOPLASTY WITH STENT PLACEMENT  02/2018    CAD s/p MARC to LCx, pLAD    ESOPHAGOGASTRODUODENOSCOPY N/A 12/20/2018    Procedure: ESOPHAGOGASTRODUODENOSCOPY (EGD) in ICU; Surgeon: Blanca Smith MD;  Location: [facility]; Service: Gastroenterology    EYE SURGERY      FRACTURE SURGERY      ORIF Rt Ankle    GLUTAMIC ACID DECARBOXYLASE (HISTORICAL)      IR AV FISTULAGRAM/GRAFTOGRAM  4/16/2020    IR PICC LINE  8/20/2018    IR TUNNELED DIALYSIS CATHETER CHECK/CHANGE/REPOSITION/ANGIOPLASTY  1/8/2020    IR TUNNELED DIALYSIS CATHETER PLACEMENT  10/21/2019    IR TUNNELED DIALYSIS CATHETER REMOVAL  5/29/2020    IR VENOUS LINE REMOVAL  10/5/2018    LEG AMPUTATION Right 02/01/2019    Above the knee    NEPHRECTOMY TRANSPLANTED ORGAN      AR ANASTOMOSIS,AV,ANY SITE Right 2/11/2020    Procedure: CREATION FISTULA ARTERIOVENOUS (AV) right upper extremity;  Surgeon: Emre Stevens MD;  Location: [address] [place] [address] MAIN OR;  Service: Vascular    AR OPEN TREATMENT FRACTURE DISTAL TIBIA FIBULA Right 7/3/2017    Procedure: OPEN REDUCTION W/ INTERNAL FIXATION (ORIF);   Surgeon: Javier Loo MD;  Location: [facility];  Service: Orthopedics    TOE AMPUTATION Right 10/27/2016    Procedure: AMPUTATION TOE;  Surgeon: Raúl Johnson DPM;  Location: [facility]; Service:        Meds/Allergies:    Prior to Admission medications    Medication Sig Start Date End Date Taking?  Authorizing Provider   albuterol (2 5 mg/3 mL) 0 083 % nebulizer solution Inhale 2 5 mg every 4 (four) hours as needed  2/8/19   Historical Provider, MD   aspirin 81 mg chewable tablet Chew 81 mg every morning     Historical Provider, MD   atorvastatin (LIPITOR) 80 mg tablet Take 80 mg by mouth every morning     Historical Provider, MD   azaTHIOprine (IMURAN) 50 mg tablet Take 1 tablet (50 mg total) by mouth every morning 8/18/20   Pawel Jeison,    carvedilol (COREG) 6 25 mg tablet Take 1 tablet (6 25 mg total) by mouth 2 (two) times a day with meals 12/10/20   [facility], DO   cinacalcet (SENSIPAR) 30 mg tablet Take 1 tablet (30 mg total) by mouth daily 11/25/20   GAVINO Terrell   ergocalciferol (ERGOCALCIFEROL) 1 25 MG (71617 UT) capsule Take 1 capsule (50,000 Units total) by mouth once a week 12/29/20   Sabra Kathleen DO   glucagon (GLUCAGON EMERGENCY) 1 MG injection Inject 1 mg under the skin once as needed for low blood sugar for up to 1 dose 10/18/19   [facility], DO   glucose blood (OneTouch Ultra) test strip 5-6 times daily 11/27/20   [facility], DO   insulin detemir (LEVEMIR) 100 units/mL subcutaneous injection Inject 8 Units under the skin 2 (two) times a day 12/29/20   Sabra Kathleen DO   insulin lispro (HumaLOG) 100 units/mL injection Inject 10 Units under the skin 3 (three) times a day before meals 12/29/20   Sabra Kathleen,    isosorbide mononitrate (IMDUR) 30 mg 24 hr tablet Take 1 tablet (30 mg total) by mouth daily 9/1/20   Attila Reyes PA-C   Lancets ([address] [facility]) [facility]  11/19/19   Historical Provider, MD   metoprolol succinate (TOPROL-XL) 25 mg 24 hr tablet Take 1 tablet (25 mg total) by mouth 2 (two) times a day 10/7/20   [facility], DO   NIFEdipine (PROCARDIA XL) 90 mg 24 hr tablet Take 1 tablet (90 mg total) by mouth daily 8/18/20   Peter Sands Meghna Livingston, DO   ondansetron (ZOFRAN-ODT) 4 mg disintegrating tablet Take 1 tablet (4 mg total) by mouth every 6 (six) hours as needed for vomiting 6/9/20   Nicho Hanks, DO   ONE TOUCH ULTRA TEST test strip CHECK BLOOD SUGAR 5 TO 6 TIMES DAILY 11/19/19   Clementina Boehringer, DO   pantoprazole (PROTONIX) 40 mg tablet Take 1 tablet (40 mg total) by mouth daily 8/18/20   Cleemntina Boehringer, DO   predniSONE 5 mg tablet Take 1 tablet (5 mg total) by mouth daily 10/22/20   Kim Guerrero, DO   Sevelamer Carbonate 0 8 g PACK Take 0 8 g by mouth 3 (three) times a day with meals 11/23/20   GAVINO Vela   sodium polystyrene sulfonate (KAYEXALATE) 15 g/60 mL suspension Use as needed daily for elevated potasium level 2/1/21   Kim Guerrero, DO   tacrolimus (PROGRAF) 1 mg capsule Take 1 capsule (1 mg total) by mouth 3 (three) times a day 0800, 1400, 2000 9/10/20   GAVINO Vela     I have reviewed home medications with patient family member  Allergies: No Known Allergies    Social History:     Marital Status: /Civil Union     Substance Use History:   Social History     Substance and Sexual Activity   Alcohol Use Not Currently     Social History     Tobacco Use   Smoking Status Never Smoker   Smokeless Tobacco Never Used     Social History     Substance and Sexual Activity   Drug Use Never       Family History:    Patient's brother is also on HD    Physical Exam:     Vitals:   Blood Pressure: (!) 179/79 (02/08/21 0845)  Pulse: 74 (02/08/21 0845)  Temperature: 98 1 °F (36 7 °C) (02/08/21 0551)  Temp Source: Temporal (02/08/21 0551)  Respirations: (!) 24 (02/08/21 0845)  Height: 5' 4" (162 6 cm) (02/08/21 0551)  Weight - Scale: 54 kg (119 lb 0 8 oz) (02/08/21 0551)  SpO2: 91 % (02/08/21 0845)    Physical Exam  Constitutional:       General: He is not in acute distress  Appearance: He is ill-appearing  He is not toxic-appearing  HENT:      Head: Normocephalic     Cardiovascular:      Rate and Rhythm: Normal rate       Heart sounds: No murmur  Pulmonary:      Comments: Currently on BiPAP IPAP 18 EPAP 6 RR 12 FiO2 40% saturating 94%  Prominent inspiratory and expiratory crackles in RLL>RUL  Abdominal:      General: Abdomen is flat  Bowel sounds are normal  There is no distension  Tenderness: There is no abdominal tenderness  There is no right CVA tenderness or left CVA tenderness  Musculoskeletal:         General: No tenderness  Left lower leg: No edema  Comments: Right above knee amputation   Neurological:      General: No focal deficit present  Mental Status: He is alert and oriented to person, place, and time  Psychiatric:         Mood and Affect: Mood normal          Behavior: Behavior normal            Additional Data:     Lab Results: I have personally reviewed pertinent reports  Results from last 7 days   Lab Units 02/08/21  0554   WBC Thousand/uL 12 01*   HEMOGLOBIN g/dL 12 9   HEMATOCRIT % 39 9   PLATELETS Thousands/uL 260   NEUTROS PCT % 74   LYMPHS PCT % 16   MONOS PCT % 6   EOS PCT % 3     Results from last 7 days   Lab Units 02/08/21  0554   SODIUM mmol/L 139   POTASSIUM mmol/L 5 4*   CHLORIDE mmol/L 102   CO2 mmol/L 21   BUN mg/dL 71*   CREATININE mg/dL 7 65*   ANION GAP mmol/L 16*   CALCIUM mg/dL 9 7   ALBUMIN g/dL 3 8   TOTAL BILIRUBIN mg/dL 0 90   ALK PHOS U/L 140*   ALT U/L 25   AST U/L 12   GLUCOSE RANDOM mg/dL 239*     Results from last 7 days   Lab Units 02/08/21  0554   INR  1 01             Results from last 7 days   Lab Units 02/08/21  0554   LACTIC ACID mmol/L 1 4       Imaging: I have personally reviewed pertinent reports  CT chest wo contrast   Final Result by Newton Daniels MD (02/08 0867)      Severe bilateral consolidation, greater on the right, with trace effusions  This is likely due to asymmetric pulmonary edema, given the history of dialysis and no signs of infection  Trace perihepatic ascites        Diffuse bone sclerosis related to renal osteodystrophy  Workstation performed: [id number]         X-ray chest 1 view portable   Final Result by Le Mcintosh MD (02/08 5414)      Pulmonary edema with severe right pneumonia  Workstation performed: [id number]             EKG, Pathology, and Other Studies Reviewed on Admission:   · EKG: Normal sinus rhythm    Allscripts / Epic Records Reviewed: Yes     ** Please Note: This note has been constructed using a voice recognition system   **

## 2021-02-08 NOTE — RESPIRATORY THERAPY NOTE
BIPAP started   Patient received from EMS on CPAP therapy  Patient is in respiratory distress  He is a known patient, he gets dialysis M-W-F, he is due to get dialysis today  I now have him on BIPAP therapy  Dr Kelly Baig has asked me to put an hour long Albuteral treatment in line for a potential potasium problem  6:18 Increased his IPAP pressure from 16 up to 18, patient's respiratory rate was 34-37   The change decreased his respiratory rate

## 2021-02-08 NOTE — HEMODIALYSIS
Post-Dialysis RN Treatment Note    Blood Pressure:  Pre 156/72 mm/Hg  Post 180/71mmHg   EDW not provided    Weight:  Pre 49 1 kg   Post 46 1 kg   Mode of weight measurement: Bed Scale   Volume Removed  3000 ml    Treatment duration 210 minutes    NS given  No    Treatment shortened? No   Medications given during Rx None Reported   Estimated Kt/V  2 14   Access type: AV fistula   Access Status: Left upper arm fistula cannulated with #16g needles x2 without difficulty  BFR to 350 with #16g needles used  +bruit/thrill   Verbal report given to Soheila Davila RN  Pt tolerated HD well

## 2021-02-08 NOTE — RESPIRATORY THERAPY NOTE
Pt began to desaturate while receiving dialysis  Vapotherm increased to 40L and 100%  SpO2 ranging from 85-90% Angel Oconnor from dialysis and [facility] ICU RN aware of the SpO2 and vapotherm increase  Will continue to monitor

## 2021-02-08 NOTE — PLAN OF CARE
Problem: Potential for Falls  Goal: Patient will remain free of falls  Description: INTERVENTIONS:  - Assess patient frequently for physical needs  -  Identify physical deficits that affect risk of falls    -  [place] fall precautions   - Educate patient on patient safety   - Instruct patient to call for assistance with activity   - Modify environment to reduce risk of injury  - Consider OT/PT consult to assist with strengthening/mobility  Outcome: Progressing     Problem: PAIN - ADULT  Goal: Verbalizes/displays adequate comfort level or baseline comfort level  Description: Interventions:  - Encourage patient to monitor pain and request assistance  - Assess pain using 0-10 pain scale  - Administer analgesics based on type and severity of pain and evaluate response  - Implement non-pharmacological measures as appropriate and evaluate response  - Notify physician/advanced practitioner if interventions unsuccessful or patient reports new pain  Outcome: Progressing     Problem: INFECTION - ADULT  Goal: Absence or prevention of progression during hospitalization  Description: INTERVENTIONS:  - Assess and monitor for signs and symptoms of infection  - Monitor WBC  - Monitor temp  - Monitor Procalcitonin  - Monitor all insertion sites  - [place] appropriate cooling/warming therapies per order  - Administer medications as ordered  - Instruct and encourage patient and family to use good hand hygiene technique  Outcome: Progressing     Problem: DISCHARGE PLANNING  Goal: Discharge to home or other facility with appropriate resources  Description: INTERVENTIONS:  - Identify barriers to discharge w/patient   - Arrange for needed discharge resources and transportation   - Identify discharge learning needs (meds, wound care, etc )  - Refer to Case Management Department for coordinating discharge planning if the patient needs post-hospital services based on physician/advanced practitioner order or complex needs related to functional status, cognitive ability, or social support system  Outcome: Progressing     Problem: Knowledge Deficit  Goal: Patient/family/caregiver demonstrates understanding of disease process, treatment plan, medications, and discharge instructions  Description: Complete learning assessment and assess knowledge base    Interventions:  - Provide teaching at level of understanding  - Provide teaching via preferred learning methods  Outcome: Progressing     Problem: RESPIRATORY - ADULT  Goal: Achieves optimal ventilation and oxygenation  Description: INTERVENTIONS:  - Assess for changes in respiratory status  - Assess for changes in mentation and behavior  - Position to facilitate oxygenation and minimize respiratory effort  - Oxygen administered by appropriate delivery if ordered  - Encourage broncho-pulmonary hygiene including cough, deep breathe, Incentive Spirometry  - Assess and instruct to report SOB or any respiratory difficulty  - Respiratory Therapy support as indicated  Outcome: Progressing     Problem: METABOLIC, FLUID AND ELECTROLYTES - ADULT  Goal: Electrolytes maintained within normal limits  Description: INTERVENTIONS:  - Monitor labs and assess patient for signs and symptoms of electrolyte imbalances  - Administer electrolyte replacement as ordered  - Monitor response to electrolyte replacements, including repeat lab results as appropriate  - Instruct patient on fluid and nutrition as appropriate  - Dialysis as ordered  Outcome: Progressing  Goal: Fluid balance maintained  Description: INTERVENTIONS:  - Monitor labs   - Monitor I/O and WT  - Instruct patient on fluid and nutrition as appropriate  - Assess for signs & symptoms of volume excess or deficit  -Dialysis as ordered  Outcome: Progressing  Goal: Glucose maintained within target range  Description: INTERVENTIONS:  - Monitor Blood Glucose as ordered  - Assess for signs and symptoms of hyperglycemia and hypoglycemia  - Administer ordered medications to maintain glucose within target range  - Assess nutritional intake and initiate nutrition service referral as needed  Outcome: Progressing

## 2021-02-08 NOTE — CONSULTS
Consultation - Pulmonary Medicine   Taryn Sofia 45 y o  male MRN: [id number]  Unit/Bed#:  Encounter: [id number]      Assessment/Plan:    1  Acute hypoxic respiratory failure   1  Saturating in the mid 80s on 10L while I was at bedside  2  Transition to Vapotherm- maintain Spo2 greater than 88%  3  Avoid BiPAP while patient is acutely nauseated   2  Abnormal Chest CT secondary to volume overload vs PNA  1  Favor flash pulmonary edema based on history and evidence of elevated proBNP on admission  2  Dialysis per Nephrology today   3  Recommend cefepime and vancomycin given chronic immunosuppression while PCT, Legionella antigen, strep pneumoniae, and blood cultures are pending  3  ESRD on HD  1  Due for diaylsis today, continue M,W,F schedule  2  Recommend nephrology consult- pending  3  Avoid nephrotoxic agents as able   4  Diabetes mellitus type 1  1  Noted  2  Treatment per primary team   5  Anemia of CKD  1  hgb stable- continue to monitor   2  Treatment per primary team     History of Present Illness   Physician Requesting Consult: Ninoska Watkins DO  Reason for Consult / Principal Problem:  Acute hypoxic respiratory failure  Hx and PE limited by: n/a  Chief Complaint: shortness of breath  HPI: Taryn Sofia is a 45 y o   male who presented to [address] [facility] with complaints of  Shortness of breath  Patient's past medical history positive for CAD status post MARC to left circumflex and LAD, end-stage renal disease on hemodialysis Monday Wednesday Friday, hypertension, hyperlipidemia, type 1 diabetes mellitus, status post renal transplant with right nephrectomy in 2009 and status post above-knee amputation presents with acute shortness of breath that will come up early this morning around 4:00 a m   No  Other pertinent symptoms per patient  Denies fevers, chills, dizziness, headache, palpitations, chest pain, cough, sputum production, hemoptysis, wheeze    No nausea, vomiting, abdominal pain prior to admission but now reports nausea currently  Denies leg swelling  Patient reports compliance on hemodialysis and last received it Friday  He reports his blood sugars have been well controlled  He has not increased his salt intake or fluid intake over the weekend  In the ED he is placed on BiPAP for respiratory distress and hypoxia  Laboratory workup revealed elevated proBNP, creatinine 7 65, a mild leukocytosis of 12,000  CT chest reveals right greater than left groundglass and consolidative opacities with trace effusions (R>L as well)  He was admitted to the ICU where he became nauseous and BiPAP was taken off  Patient had continued desaturations on nasal cannula and Pulmonary consult to help manage this  Presently his only complaint is  Nausea without episodes of emesis  It is shortness is improved  Inpatient consult to Pulmonology  Consult performed by: [address], PA-C  Consult ordered by: Marine Yoon MD          Review of Systems   All other systems reviewed and are negative        Historical Information   Past Medical History:   Diagnosis Date    Bacteremia 12/21/2018    CAD (coronary artery disease)     s/p MARC to LCx, pLAD 2/2018    Cardiac arrest (HCC)     Chronic kidney disease     Diabetes mellitus type 1 ([facility] )     Dialysis patient [facility])     Access in right chest    GI bleed     Hyperlipidemia     Hypertension     Infection at site of external fixator pin ([facility] )     MI (myocardial infarction) ([facility] )     Pneumonia     Last Assessed [id number]    Renal failure     Renal transplant, status post 07/21/2007     Past Surgical History:   Procedure Laterality Date    AMPUTATION Right 02/2019    aboce knee    ANKLE FRACTURE SURGERY      ANKLE HARDWARE REMOVAL Right 7/31/2017    Procedure: REMOVAL HARDWARE ANKLE;  Surgeon: Faith Montiel MD;  Location: [facility];  Service: Orthopedics    ANKLE HARDWARE REMOVAL Right 8/17/2017    Procedure: TIBIA FAILED HARDWARE REMOVAL;  Surgeon: Isabella Madison MD;  Location: [facility];  Service: Orthopedics    CLOSED REDUCTION ANKLE Right 7/3/2017    Procedure: CLOSED REDUCTION DISTAL TIB-FIB AND CASTING VS;  Surgeon: Isabella Madison MD;  [facility]: [facility];  Service: Orthopedics    CORONARY ANGIOPLASTY WITH STENT PLACEMENT  02/2018    CAD s/p MARC to LCx, pLAD    ESOPHAGOGASTRODUODENOSCOPY N/A 12/20/2018    Procedure: ESOPHAGOGASTRODUODENOSCOPY (EGD) in ICU; Surgeon: Beck Smith MD;  Location: [facility]; Service: Gastroenterology    EYE SURGERY      FRACTURE SURGERY      ORIF Rt Ankle    GLUTAMIC ACID DECARBOXYLASE (HISTORICAL)      IR AV FISTULAGRAM/GRAFTOGRAM  4/16/2020    IR PICC LINE  8/20/2018    IR TUNNELED DIALYSIS CATHETER CHECK/CHANGE/REPOSITION/ANGIOPLASTY  1/8/2020    IR TUNNELED DIALYSIS CATHETER PLACEMENT  10/21/2019    IR TUNNELED DIALYSIS CATHETER REMOVAL  5/29/2020    IR VENOUS LINE REMOVAL  10/5/2018    LEG AMPUTATION Right 02/01/2019    Above the knee    NEPHRECTOMY TRANSPLANTED ORGAN      OK ANASTOMOSIS,AV,ANY SITE Right 2/11/2020    Procedure: CREATION FISTULA ARTERIOVENOUS (AV) right upper extremity;  Surgeon: Jeffery Man MD;  Location: [address] [place] [address] MAIN OR;  Service: Vascular    OK OPEN TREATMENT FRACTURE DISTAL TIBIA FIBULA Right 7/3/2017    Procedure: OPEN REDUCTION W/ INTERNAL FIXATION (ORIF);   Surgeon: Isabella Madison MD;  Location: [facility];  Service: Orthopedics    TOE AMPUTATION Right 10/27/2016    Procedure: AMPUTATION TOE;  Surgeon: Ryder Calhoun DPM;  Location: [facility];  Service:      Social History   Social History     Substance and Sexual Activity   Alcohol Use Not Currently    Alcohol/week: 0 0 standard drinks    Frequency: Never    Binge frequency: Never     Social History     Substance and Sexual Activity   Drug Use Never     Social History     Tobacco Use   Smoking Status Never Smoker   Smokeless Tobacco Never Used     E-Cigarette/Vaping    E-Cigarette Use Never User      E-Cigarette/Vaping Substances    Nicotine No     THC No     CBD No     Flavoring No     Other No     Unknown No      Occupational History: disabled    Family History:   Family History   Problem Relation Age of Onset    Diabetes Brother     Coronary artery disease Mother     No Known Problems Father        Meds/Allergies   all current active meds have been reviewed, pertinent pulmonary meds have been reviewed and current meds:   Current Facility-Administered Medications   Medication Dose Route Frequency    acetaminophen (TYLENOL) tablet 650 mg  650 mg Oral Q6H PRN    albuterol inhalation solution 2 5 mg  2 5 mg Nebulization Q6H PRN    aspirin chewable tablet 81 mg  81 mg Oral QAM    atorvastatin (LIPITOR) tablet 80 mg  80 mg Oral QPM    azaTHIOprine (IMURAN) tablet 50 mg  50 mg Oral QAM    carvedilol (COREG) tablet 6 25 mg  6 25 mg Oral BID With Meals    [START ON 2/9/2021] cefepime (MAXIPIME) IVPB (premix in dextrose) 1,000 mg 50 mL  1,000 mg Intravenous Q24H    heparin (porcine) subcutaneous injection 5,000 Units  5,000 Units Subcutaneous Q8H [address]    hydrALAZINE (APRESOLINE) tablet 50 mg  50 mg Oral Q8H [address]    insulin detemir (LEVEMIR) subcutaneous injection 8 Units  8 Units Subcutaneous Q12H [address]    insulin lispro (HumaLOG) 100 units/mL subcutaneous injection 1-5 Units  1-5 Units Subcutaneous TID AC    isosorbide mononitrate (IMDUR) 24 hr tablet 30 mg  30 mg Oral Daily    Labetalol HCl (NORMODYNE) injection 10 mg  10 mg Intravenous Q4H PRN    NIFEdipine (PROCARDIA XL) 24 hr tablet 90 mg  90 mg Oral Daily    ondansetron (ZOFRAN) injection 4 mg  4 mg Intravenous Q4H PRN    pantoprazole (PROTONIX) EC tablet 40 mg  40 mg Oral Daily Before Breakfast    predniSONE tablet 5 mg  5 mg Oral Daily    sodium chloride (PF) 0 9 % injection 3 mL  3 mL Intravenous Q1H PRN    tacrolimus (PROGRAF) capsule 2 mg  2 mg Oral Daily    And    tacrolimus (PROGRAF) capsule 1 mg  1 mg Oral HS  [START ON 2/9/2021] vancomycin (VANCOCIN) 489 mg in sodium chloride 0 9 % 100 mL IVPB  10 mg/kg Intravenous Q24H       No Known Allergies    Objective   Vitals: Blood pressure 142/65, pulse (!) 52, temperature 98 2 °F (36 8 °C), temperature source Temporal, resp  rate (!) 30, height 5' 4" (1 626 m), weight 48 9 kg (107 lb 12 9 oz), SpO2 91 %  10L NC,Body mass index is 18 5 kg/m²  Intake/Output Summary (Last 24 hours) at 2/8/2021 1101  Last data filed at 2/8/2021 0805  Gross per 24 hour   Intake 50 ml   Output --   Net 50 ml     Invasive Devices     Peripheral Intravenous Line            Peripheral IV 02/08/21 Left Antecubital less than 1 day    Peripheral IV 02/08/21 Left Wrist less than 1 day          Line            Hemodialysis AV Fistula Right Upper arm -- days                Physical Exam  Vitals signs and nursing note reviewed  Constitutional:       General: He is not in acute distress  Appearance: Normal appearance  HENT:      Head: Normocephalic and atraumatic  Right Ear: External ear normal       Left Ear: External ear normal       Nose: Nose normal       Mouth/Throat:      Mouth: Mucous membranes are moist       Pharynx: Oropharynx is clear  Eyes:      Extraocular Movements: Extraocular movements intact  Conjunctiva/sclera: Conjunctivae normal       Pupils: Pupils are equal, round, and reactive to light  Neck:      Musculoskeletal: Normal range of motion and neck supple  No muscular tenderness  Cardiovascular:      Rate and Rhythm: Normal rate and regular rhythm  Pulses: Normal pulses  Heart sounds: No murmur  Pulmonary:      Effort: Pulmonary effort is normal       Breath sounds: Rales present  No wheezing or rhonchi  Abdominal:      General: Bowel sounds are normal       Palpations: Abdomen is soft  There is no mass  Tenderness: There is no abdominal tenderness  Hernia: No hernia is present  Musculoskeletal: Normal range of motion           General: No tenderness  Left lower leg: No edema  Comments: R AKA noted   Skin:     General: Skin is warm and dry  Neurological:      General: No focal deficit present  Mental Status: He is alert and oriented to person, place, and time  Mental status is at baseline  Psychiatric:         Mood and Affect: Mood normal          Behavior: Behavior normal          Thought Content: Thought content normal          Judgment: Judgment normal          Lab Results:   I have personally reviewed pertinent lab results  , ABG:   Lab Results   Component Value Date    PHART 7 363 02/08/2021    XJJ5XAP 35 7 (L) 02/08/2021    PO2ART 132 1 (H) 02/08/2021    FDN3CPH 19 9 (L) 02/08/2021    BEART -4 9 02/08/2021    SOURCE Radial, Left 02/08/2021   , BNP: No results found for: BNP, CBC:   Lab Results   Component Value Date    WBC 12 01 (H) 02/08/2021    HGB 12 9 02/08/2021    HCT 39 9 02/08/2021     (H) 02/08/2021     02/08/2021    MCH 32 7 02/08/2021    MCHC 32 3 02/08/2021    RDW 13 8 02/08/2021    MPV 8 3 (L) 02/08/2021    NRBC 0 02/08/2021   , CMP:   Lab Results   Component Value Date    SODIUM 139 02/08/2021    K 5 4 (H) 02/08/2021     02/08/2021    CO2 21 02/08/2021    BUN 71 (H) 02/08/2021    CREATININE 7 65 (H) 02/08/2021    CALCIUM 9 7 02/08/2021    AST 12 02/08/2021    ALT 25 02/08/2021    ALKPHOS 140 (H) 02/08/2021    EGFR 7 02/08/2021   , PT/INR:   Lab Results   Component Value Date    INR 1 01 02/08/2021   , Troponin:   Lab Results   Component Value Date    TROPONINI <0 02 02/08/2021       Imaging Studies: I have personally reviewed pertinent reports  and I have personally reviewed pertinent films in PACS       CT chest without contrast 02/08/2021   Severe bilateral consolidative  And ground-glass opacities right greater than left with trace pleural effusions  Trace perihepatic ascites    Diffuse bone sclerosis  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

## 2021-02-08 NOTE — PROGRESS NOTES
Vancomycin Assessment    Berto Sánchez is a 45 y o  male who is currently receiving vancomycin 750mg LD followed by 500mg IV after each HD session for Pneumonia     Relevant clinical data and objective history reviewed:  Creatinine   Date Value Ref Range Status   02/08/2021 7 65 (H) 0 60 - 1 30 mg/dL Final     Comment:     Standardized to IDMS reference method   10/18/2020 4 01 (H) 0 60 - 1 30 mg/dL Final     Comment:     Standardized to IDMS reference method   09/18/2020 4 40 (H) 0 60 - 1 30 mg/dL Final     Comment:     Standardized to IDMS reference method   11/06/2015 1 26 0 60 - 1 30 mg/dL Final     Comment:     Standardized to IDMS reference method   06/05/2015 1 07 0 60 - 1 30 mg/dL Final     Comment:     Standardized to IDMS reference method   12/14/2014 1 13 0 60 - 1 30 mg/dL Final     Comment:     Standardized to IDMS reference method     Vancomycin Rm   Date Value Ref Range Status   10/23/2019 24 1 ug/mL Final     /62   Pulse 81   Temp 98 2 °F (36 8 °C) (Temporal)   Resp (!) 35   Ht 5' 4" (1 626 m)   Wt 48 9 kg (107 lb 12 9 oz)   SpO2 93%   BMI 18 50 kg/m²   No intake/output data recorded  Lab Results   Component Value Date/Time    BUN 71 (H) 02/08/2021 05:54 AM    BUN 31 (H) 11/06/2015 07:34 AM    WBC 12 01 (H) 02/08/2021 05:54 AM    WBC 6 52 11/06/2015 07:34 AM    HGB 12 9 02/08/2021 05:54 AM    HGB 11 7 (L) 11/06/2015 07:34 AM    HCT 39 9 02/08/2021 05:54 AM    HCT 35 4 (L) 11/06/2015 07:34 AM     (H) 02/08/2021 05:54 AM    MCV 89 11/06/2015 07:34 AM     02/08/2021 05:54 AM     11/06/2015 07:34 AM     Temp Readings from Last 3 Encounters:   02/08/21 98 2 °F (36 8 °C) (Temporal)   11/22/20 98 1 °F (36 7 °C) (Temporal)   10/18/20 97 6 °F (36 4 °C)     Vancomycin Days of Therapy: 1    Assessment/Plan  The patient is currently on vancomycin utilizing pulse dosing based on actual body weight    Baseline risks associated with therapy include: pre-existing renal impairment  The patient is currently receiving 750mg LD followed by 500mg IV after each HD session and is clinically appropriate and dose will be continued  Pharmacy will also follow closely for s/sx of nephrotoxicity, infusion reactions, and appropriateness of therapy  BMP and CBC will be ordered per protocol  Plan for trough as patient approaches steady state, prior to the 3rd  dose at approximately 0600 on 2/11/21  Due to infection severity, will target a trough of 15-20 (appropriate for most indications)   Pharmacy will continue to follow the patients culture results and clinical progress daily      Nilo Bhatt, Pharmacist

## 2021-02-08 NOTE — ASSESSMENT & PLAN NOTE
K+ of 5 4 on admission, monitor BMP, if persist may utilize kayexalate 

## 2021-02-08 NOTE — RESPIRATORY THERAPY NOTE
Pt transported to  from ED on Bipap of 18/6x40%  Pt was tried off Bipap by RN but desaturated and became SOB  Pt SpO2 91% -94% during transport  Pt remains on the same [facility]

## 2021-02-08 NOTE — ASSESSMENT & PLAN NOTE
Lab Results   Component Value Date    HGBA1C 7 4 (H) 09/17/2020       No results for input(s): POCGLU in the last 72 hours      Blood Sugar Average: Last 72 hrs:     Home regimen: Insulin Levemir 10 Units BID and Lispro coverage as needed  Continue with Levemir 8 U BID and ISS coverage Alg #1  Accuchsteven AC/HS

## 2021-02-08 NOTE — ASSESSMENT & PLAN NOTE
Patient reports short of breath started this morning around 4:00 a m  While sleeping  Baseline no supplemental oxygen at home  Placed on CPAP en route to ED  Placed on BiPAP at ED     · Likely secondary to pneumonia and diastolic dysfunction and ESRD on HD  · Continue BiPAP, /35 7/132  9/-4 9 on 18/6/70%, adjusted to 18/6/40%  · Pneumonia work up as noted below  · Respiratory protocol  · Incentive spirometry

## 2021-02-08 NOTE — CASE MANAGEMENT
Cm met with the patient to evaluate the patients prior function and living situation and any barriers to d/c and form a safe d/c plan  Cm also evaluated the patient for any services in the home or needs for services  Patient states he lives w/his wife in a house w/0 DENNIS's and 8 inside steps  He is independent with his ADL's  He has a walker, cane, bedside commode, shower chair and glucometer at home  He gets dialysis M/W/F at [facility] in [place] and his wife transports him  He has had [facility] home care in the past  PCP is Dr Franklin Woods  His cardiologist is Dr Simón Ellison  He uses [facility] in [facility]  CM will assist with discharge needs  He plans to return home with wife on discharge and she will transport him home

## 2021-02-08 NOTE — ASSESSMENT & PLAN NOTE
Lab Results   Component Value Date    EGFR 7 02/08/2021    EGFR 16 10/18/2020    EGFR 15 09/18/2020    CREATININE 7 65 (H) 02/08/2021    CREATININE 4 01 (H) 10/18/2020    CREATININE 4 40 (H) 09/18/2020     Patient is on HD M, W, F  Nephrology consult placed  Baseline Cr fluctuate between 3 5-7 4

## 2021-02-08 NOTE — CONSULTS
Consultation - Nephrology   Amrik Adam 45 y o  male MRN: [id number]  Unit/Bed#:  Encounter: [id number]      A/P:  1  End-stage renal disease   Will provide the patient hemodialysis later today, will ultrafilter as tolerated  Will continue with Tuesday Thursday Saturday hemodialysis sessions after today, meaning that his next session will be tomorrow, February 9th  2  Secondary hyperparathyroidism   Continue monitor phosphorus levels, PTH in the outpatient setting  3  Anemia of chronic kidney disease   Continue monitor hemoglobin, patient's last levels were appropriate, no need for CAPRICE at this time  4  Diabetic nephropathy  5  History of but he related renal transplant on chronic immunosuppression, now end-stage renal  6  Possible healthcare associated pneumonia   Continue broad-spectrum antibiotics, defer to hospitalist and Pulmonary for direction  7  Acute respiratory failure   Patient have BiPAP discontinued due to feeling of nausea, will be transitioned to Vapotherm by Pulmonary in the near future  Will try to ultrafilter as tolerated with hemodialysis  Thank you for allowing us to participate in the care of your patient  Please feel free to contact us regarding the care of this patient, or any other questions/concerns that may be applicable      Patient Active Problem List   Diagnosis    Osteomyelitis ([address]    Foot pain    Type 1 diabetes mellitus ([address] )    Renal transplant, status post    Hyperkalemia    Sepsis ([address]    Acute kidney injury ([address] )    Osteomyelitis ([address]    Poor circulation    Closed fracture of distal end of right tibia with routine healing    Chronic kidney disease, stage IV (severe) ([address] )    Chronic osteomyelitis of tibia ([address]    Immunosuppression ([address] )    Hypertension    DKA (diabetic ketoacidoses) (HCC)    Elevated troponin    Diarrhea    Cellulitis of ankle    Non-ST elevation myocardial infarction (NSTEMI), type 2    Urinary retention    Severe sepsis (HCC)    Acute renal failure (ARF) (HCC)    Acute kidney injury superimposed on CKD (HCC)    Metabolic acidosis    Hyperphosphatemia    Hyponatremia    Gastrointestinal hemorrhage    Bacteremia    S/P AKA (above knee amputation), right (HCC)    Acute blood loss anemia    Acute respiratory failure with hypoxia (HCC)    Pulmonary hypertension (HCC)    Chronic anemia    Depressed left ventricular ejection fraction    Acute pulmonary edema (HCC)    Hx of right BKA (HCC)    Hypertensive urgency    ESRD (end stage renal disease) on dialysis ([facility]    Coronary artery disease involving native coronary artery of native heart without angina pectoris    Pre-operative cardiovascular examination    Chronic kidney disease, unspecified    Lesion of pancreas    Abnormal CT scan, colon    Respiratory distress    Community acquired pneumonia of right lower lobe of lung    HCAP (healthcare-associated pneumonia)    Acute on chronic respiratory failure with hypoxia (HCC)    Acute respiratory failure (HCC)    Dyspnea and respiratory abnormality    Community acquired pneumonia of right lung       History of Present Illness   Physician Requesting Consult: Gilberto Dooley DO  Reason for Consult / Principal Problem:  End-stage renal disease   Hx and PE limited by:   HPI: Jonah Toure is a 45y o  year old male who presents to the emergency room status post acute shortness of breath which began earlier this morning  Patient was brought to the hospital by his wife due to the difficulty breathing  Patient denies cough, however upon further workup, his note most likely have a healthcare associated pneumonia, with evidence of pulmonary edema  Patient is end-stage renal disease and his last hemodialysis session was as scheduled on Friday, February 5th      At this time, the patient has no specific complaints, he was originally on BiPAP but this was discontinued due to the patient having increased symptoms of nausea with small episodes of emesis  He is now feeling improved status post Zofran  From the renal standpoint, patient is compliant with treatments, he has been doing a good job with volume management in the inter-dialytic period  Patient with occasional episodes of hyperkalemia, otherwise no other specific issues with dialysis  Phosphorus is at the bone mineral issues are typically appropriate  Previous electronic medical records reviewed during the course this consultation  History obtained from chart review and the patient    Review of Systems - Negative except as mentioned above in HPI, more specifics as mentioned below    Review of Systems - General ROS: positive for  - fatigue  Psychological ROS: negative  Ophthalmic ROS: negative  ENT ROS: negative  Allergy and Immunology ROS: negative  Hematological and Lymphatic ROS: negative  Endocrine ROS: negative  Respiratory ROS: positive for - shortness of breath  Cardiovascular ROS: positive for - dyspnea on exertion and shortness of breath  Gastrointestinal ROS: negative  Genito-Urinary ROS: no dysuria, trouble voiding, or hematuria  Musculoskeletal ROS: negative  Neurological ROS: no TIA or stroke symptoms  Dermatological ROS: negative    Historical Information   Past Medical History:   Diagnosis Date    Bacteremia 12/21/2018    CAD (coronary artery disease)     s/p MARC to LCx, pLAD 2/2018    Cardiac arrest [facility]     Chronic kidney disease     Diabetes mellitus type 1 ([facility] )     Dialysis patient ([facility] )     Access in right chest    GI bleed     Hyperlipidemia     Hypertension     Infection at site of external fixator pin ([address] )     MI (myocardial infarction) ([facility]     Pneumonia     Last Assessed [id number]    Renal failure     Renal transplant, status post 07/21/2007     Past Surgical History:   Procedure Laterality Date    AMPUTATION Right 02/2019    aboce knee    ANKLE FRACTURE SURGERY      ANKLE HARDWARE REMOVAL Right 7/31/2017    Procedure: REMOVAL HARDWARE ANKLE;  Surgeon: Jaylon De La Garza MD;  Location: [facility];  Service: Orthopedics    ANKLE HARDWARE REMOVAL Right 8/17/2017    Procedure: TIBIA FAILED HARDWARE REMOVAL;  Surgeon: Jaylon De La Garza MD;  Location: [facility];  Service: Orthopedics    CLOSED REDUCTION ANKLE Right 7/3/2017    Procedure: CLOSED REDUCTION DISTAL TIB-FIB AND CASTING VS;  Surgeon: Jaylon De La Garza MD;  Location: [facility];  Service: Orthopedics    CORONARY ANGIOPLASTY WITH STENT PLACEMENT  02/2018    CAD s/p MARC to LCx, pLAD    ESOPHAGOGASTRODUODENOSCOPY N/A 12/20/2018    Procedure: ESOPHAGOGASTRODUODENOSCOPY (EGD) in ICU; Surgeon: Dhiraj Garcia MD;  Location: [facility]; Service: Gastroenterology    EYE SURGERY      FRACTURE SURGERY      ORIF Rt Ankle    GLUTAMIC ACID DECARBOXYLASE (HISTORICAL)      IR AV FISTULAGRAM/GRAFTOGRAM  4/16/2020    IR PICC LINE  8/20/2018    IR TUNNELED DIALYSIS CATHETER CHECK/CHANGE/REPOSITION/ANGIOPLASTY  1/8/2020    IR TUNNELED DIALYSIS CATHETER PLACEMENT  10/21/2019    IR TUNNELED DIALYSIS CATHETER REMOVAL  5/29/2020    IR VENOUS LINE REMOVAL  10/5/2018    LEG AMPUTATION Right 02/01/2019    Above the knee    NEPHRECTOMY TRANSPLANTED ORGAN      [place] ANASTOMOSIS,AV,ANY SITE Right 2/11/2020    Procedure: CREATION FISTULA ARTERIOVENOUS (AV) right upper extremity;  Surgeon: Bella Bill MD;  Location[facility] MAIN OR;  Service: Vascular    [place] OPEN TREATMENT FRACTURE DISTAL TIBIA FIBULA Right 7/3/2017    Procedure: OPEN REDUCTION W/ INTERNAL FIXATION (ORIF);   Surgeon: Jaylon De La Garza MD;  [facility]: [facility];  Service: Orthopedics    TOE AMPUTATION Right 10/27/2016    Procedure: AMPUTATION TOE;  Surgeon: Lorena Pappas DPM;  Location: [facility];  Service:      Social History   Social History     Substance and Sexual Activity   Alcohol Use Not Currently    Alcohol/week: 0 0 standard drinks    Frequency: Never    Binge frequency: Never     Social History Substance and Sexual Activity   Drug Use Never     Social History     Tobacco Use   Smoking Status Never Smoker   Smokeless Tobacco Never Used     Family History   Problem Relation Age of Onset    Diabetes Brother     Coronary artery disease Mother     No Known Problems Father        Meds/Allergies   all current active meds have been reviewed, current meds:   Current Facility-Administered Medications   Medication Dose Route Frequency    acetaminophen (TYLENOL) tablet 650 mg  650 mg Oral Q6H PRN    albuterol inhalation solution 2 5 mg  2 5 mg Nebulization Q6H PRN    aspirin chewable tablet 81 mg  81 mg Oral QAM    atorvastatin (LIPITOR) tablet 80 mg  80 mg Oral QPM    azaTHIOprine (IMURAN) tablet 50 mg  50 mg Oral QAM    carvedilol (COREG) tablet 6 25 mg  6 25 mg Oral BID With Meals    [START ON 2/9/2021] cefepime (MAXIPIME) IVPB (premix in dextrose) 1,000 mg 50 mL  1,000 mg Intravenous Q24H    heparin (porcine) subcutaneous injection 5,000 Units  5,000 Units Subcutaneous Q8H [facility]    hydrALAZINE (APRESOLINE) tablet 50 mg  50 mg Oral Q8H [facility]    insulin detemir (LEVEMIR) subcutaneous injection 8 Units  8 Units Subcutaneous Q12H [facility]    insulin lispro (HumaLOG) 100 units/mL subcutaneous injection 1-5 Units  1-5 Units Subcutaneous TID AC    isosorbide mononitrate (IMDUR) 24 hr tablet 30 mg  30 mg Oral Daily    Labetalol HCl (NORMODYNE) injection 10 mg  10 mg Intravenous Q4H PRN    NIFEdipine (PROCARDIA XL) 24 hr tablet 90 mg  90 mg Oral Daily    ondansetron (ZOFRAN) injection 4 mg  4 mg Intravenous Q4H PRN    pantoprazole (PROTONIX) EC tablet 40 mg  40 mg Oral Daily Before Breakfast    predniSONE tablet 5 mg  5 mg Oral Daily    sodium chloride (PF) 0 9 % injection 3 mL  3 mL Intravenous Q1H PRN    tacrolimus (PROGRAF) capsule 2 mg  2 mg Oral Daily    And    tacrolimus (PROGRAF) capsule 1 mg  1 mg Oral HS    [START ON 2/9/2021] vancomycin (VANCOCIN) 489 mg in sodium chloride 0 9 % 100 mL IVPB  10 mg/kg Intravenous Q24H    and PTA meds:    Medications Prior to Admission   Medication    aspirin 81 mg chewable tablet    atorvastatin (LIPITOR) 80 mg tablet    azaTHIOprine (IMURAN) 50 mg tablet    B Complex-C-Folic Acid (Nela-Juan Jose) TABS    carvedilol (COREG) 6 25 mg tablet    ergocalciferol (ERGOCALCIFEROL) 1 25 MG (19295 UT) capsule    hydrALAZINE (APRESOLINE) 50 mg tablet    insulin detemir (LEVEMIR) 100 units/mL subcutaneous injection    isosorbide mononitrate (IMDUR) 30 mg 24 hr tablet    metoprolol succinate (TOPROL-XL) 25 mg 24 hr tablet    NIFEdipine (PROCARDIA XL) 90 mg 24 hr tablet    pantoprazole (PROTONIX) 40 mg tablet    predniSONE 5 mg tablet    albuterol (2 5 mg/3 mL) 0 083 % nebulizer solution    glucagon (GLUCAGON EMERGENCY) 1 MG injection    glucose blood (OneTouch Ultra) test strip    insulin lispro (HumaLOG) 100 units/mL injection    Lancets (ONETOUCH DELICA PLUS [id number]) MISC    ondansetron (ZOFRAN-ODT) 4 mg disintegrating tablet    ONE TOUCH ULTRA TEST test strip    sodium polystyrene sulfonate (KAYEXALATE) 15 g/60 mL suspension    tacrolimus (PROGRAF) 1 mg capsule         No Known Allergies    Objective     Intake/Output Summary (Last 24 hours) at 2/8/2021 1139  Last data filed at 2/8/2021 0805  Gross per 24 hour   Intake 50 ml   Output --   Net 50 ml       Invasive Devices:        Physical Exam      No intake/output data recorded  Vitals:    02/08/21 1128   BP:    Pulse:    Resp:    Temp:    SpO2: 91%       Gen: in NAD, Alert/Awake  HEENT: no sclerous icterus, MMM, neck supple  CV: +S1/S2, RRR  Lungs:  Reduced bilaterally with crackles  Abd: +BS, soft NT/ND  Ext: all four extremities are warm, mild bilateral lower extremity edema  Skin: no rashes noted  Neuro: CN II-XII intact    Current Weight: Weight - Scale: 48 9 kg (107 lb 12 9 oz)  First Weight: Weight - Scale: 54 kg (119 lb 0 8 oz)    Lab Results:  I have personally reviewed pertinent labs      CBC:   Lab Results   Component Value Date    WBC 12 01 (H) 02/08/2021    HGB 12 9 02/08/2021    HCT 39 9 02/08/2021     (H) 02/08/2021     02/08/2021    MCH 32 7 02/08/2021    MCHC 32 3 02/08/2021    RDW 13 8 02/08/2021    MPV 8 3 (L) 02/08/2021    NRBC 0 02/08/2021     CMP:   Lab Results   Component Value Date    K 5 4 (H) 02/08/2021     02/08/2021    CO2 21 02/08/2021    BUN 71 (H) 02/08/2021    CREATININE 7 65 (H) 02/08/2021    CALCIUM 9 7 02/08/2021    AST 12 02/08/2021    ALT 25 02/08/2021    ALKPHOS 140 (H) 02/08/2021    EGFR 7 02/08/2021     Phosphorus: No results found for: PHOS  Magnesium:   Lab Results   Component Value Date    MG 3 0 (H) 02/08/2021     Urinalysis: No results found for: [place] Emily, SPECGRAV, PHUR, LEUKOCYTESUR, NITRITE, PROTEINUA, GLUCOSEU, KETONESU, BILIRUBINUR, BLOODU  Ionized Calcium: No results found for: CAION  Coagulation:   Lab Results   Component Value Date    INR 1 01 02/08/2021     Troponin:   Lab Results   Component Value Date    TROPONINI <0 02 02/08/2021     ABG:   Lab Results   Component Value Date    PHART 7 363 02/08/2021    HTR4IEY 35 7 (L) 02/08/2021    PO2ART 132 1 (H) 02/08/2021    PJH0TIZ 19 9 (L) 02/08/2021    BEART -4 9 02/08/2021    SOURCE Radial, Left 02/08/2021       Results from last 7 days   Lab Units 02/08/21  0554   POTASSIUM mmol/L 5 4*   CHLORIDE mmol/L 102   CO2 mmol/L 21   BUN mg/dL 71*   CREATININE mg/dL 7 65*   CALCIUM mg/dL 9 7   ALK PHOS U/L 140*   ALT U/L 25   AST U/L 12       Radiology review:  Procedure: X-ray Chest 1 View Portable    Result Date: 2/8/2021  Narrative: CHEST INDICATION:   chest pain  COMPARISON:  Chest radiograph from 10/18/2020 and abdomen CT from 6/9/2020  EXAM PERFORMED/VIEWS:  XR CHEST PORTABLE FINDINGS: Cardiomediastinal silhouette appears unremarkable  Pulmonary edema with severe pneumonia in the right midlung and right base  No effusion or pneumothorax   Osseous structures appear within normal limits for patient age      Impression: Pulmonary edema with severe right pneumonia  Workstation performed: [id number]     Procedure: Ct Chest Wo Contrast    Result Date: 2/8/2021  Narrative: CT CHEST WITHOUT IV CONTRAST INDICATION:   "Pleural effusion, respiratory failure  "  Increasing shortness of breath, dyspnea on exertion which began earlier this morning waking the patient  No cough or fever  History of coronary artery disease and chronic kidney disease on dialysis  COMPARISON:  Chest radiograph from today and 10/18/2020  Chest CT from 12/21/2018  TECHNIQUE: CT examination of the chest was performed without intravenous contrast   Axial, sagittal, and coronal 2D reformatted images were created from the source data and submitted for interpretation  Radiation dose length product (DLP) for this visit:  218 84 mGy-cm   This examination, like all CT scans performed in the [facility], was performed utilizing techniques to minimize radiation dose exposure, including the use of iterative  reconstruction and automated exposure control  FINDINGS: LUNGS:  Severe consolidation with air bronchograms throughout the right lung with moderate consolidation in the left lower lobe  No significant filling defects in the trachea and bronchi  PLEURA:  Trace effusions  HEART/GREAT VESSELS:  Mild cardiomegaly  Moderate coronary artery calcification indicating atherosclerotic heart disease  MEDIASTINUM AND DONNY:  Unremarkable  CHEST WALL AND LOWER NECK:   Unremarkable  UPPER ABDOMEN:  Trace perihepatic ascites  Severe renal atrophy  OSSEOUS STRUCTURES:  Diffuse sclerosis  Impression: Severe bilateral consolidation, greater on the right, with trace effusions  This is likely due to asymmetric pulmonary edema, given the history of dialysis and no signs of infection  Trace perihepatic ascites  Diffuse bone sclerosis related to renal osteodystrophy   Workstation performed: [id number]         Elier Era, DO      This consultation note was produced in part using a dictation device which may document imprecise wording from author's original intent

## 2021-02-09 ENCOUNTER — APPOINTMENT (INPATIENT)
Dept: RADIOLOGY | Facility: HOSPITAL | Age: 51
DRG: 291 | End: 2021-02-09
Payer: COMMERCIAL

## 2021-02-09 ENCOUNTER — APPOINTMENT (INPATIENT)
Dept: DIALYSIS | Facility: HOSPITAL | Age: 51
DRG: 291 | End: 2021-02-09
Attending: INTERNAL MEDICINE
Payer: COMMERCIAL

## 2021-02-09 PROBLEM — J81.1 PULMONARY EDEMA: Status: ACTIVE | Noted: 2021-02-08

## 2021-02-09 LAB
ALBUMIN SERPL BCP-MCNC: 3.3 G/DL (ref 3.5–5)
ALP SERPL-CCNC: 100 U/L (ref 46–116)
ALT SERPL W P-5'-P-CCNC: 23 U/L (ref 12–78)
ANION GAP SERPL CALCULATED.3IONS-SCNC: 12 MMOL/L (ref 4–13)
AST SERPL W P-5'-P-CCNC: 21 U/L (ref 5–45)
BASOPHILS # BLD AUTO: 0.1 THOUSANDS/ΜL (ref 0–0.1)
BASOPHILS NFR BLD AUTO: 1 % (ref 0–1)
BILIRUB SERPL-MCNC: 1.2 MG/DL (ref 0.2–1)
BUN SERPL-MCNC: 30 MG/DL (ref 5–25)
CALCIUM ALBUM COR SERPL-MCNC: 10.2 MG/DL (ref 8.3–10.1)
CALCIUM SERPL-MCNC: 9.6 MG/DL (ref 8.3–10.1)
CHLORIDE SERPL-SCNC: 101 MMOL/L (ref 100–108)
CK SERPL-CCNC: 56 U/L (ref 39–308)
CO2 SERPL-SCNC: 24 MMOL/L (ref 21–32)
CREAT SERPL-MCNC: 4.51 MG/DL (ref 0.6–1.3)
EOSINOPHIL # BLD AUTO: 0.29 THOUSAND/ΜL (ref 0–0.61)
EOSINOPHIL NFR BLD AUTO: 3 % (ref 0–6)
ERYTHROCYTE [DISTWIDTH] IN BLOOD BY AUTOMATED COUNT: 14.4 % (ref 11.6–15.1)
EST. AVERAGE GLUCOSE BLD GHB EST-MCNC: 134 MG/DL
GFR SERPL CREATININE-BSD FRML MDRD: 14 ML/MIN/1.73SQ M
GLUCOSE SERPL-MCNC: 121 MG/DL (ref 65–140)
GLUCOSE SERPL-MCNC: 173 MG/DL (ref 65–140)
GLUCOSE SERPL-MCNC: 25 MG/DL (ref 65–140)
GLUCOSE SERPL-MCNC: 434 MG/DL (ref 65–140)
GLUCOSE SERPL-MCNC: 462 MG/DL (ref 65–140)
GLUCOSE SERPL-MCNC: 88 MG/DL (ref 65–140)
GLUCOSE SERPL-MCNC: <20 MG/DL (ref 65–140)
HBA1C MFR BLD: 6.3 %
HCT VFR BLD AUTO: 38.5 % (ref 36.5–49.3)
HGB BLD-MCNC: 12.3 G/DL (ref 12–17)
IMM GRANULOCYTES # BLD AUTO: 0.04 THOUSAND/UL (ref 0–0.2)
IMM GRANULOCYTES NFR BLD AUTO: 0 % (ref 0–2)
LACTATE SERPL-SCNC: 0.6 MMOL/L (ref 0.5–2)
LYMPHOCYTES # BLD AUTO: 3.25 THOUSANDS/ΜL (ref 0.6–4.47)
LYMPHOCYTES NFR BLD AUTO: 28 % (ref 14–44)
MAGNESIUM SERPL-MCNC: 2.5 MG/DL (ref 1.6–2.6)
MCH RBC QN AUTO: 32 PG (ref 26.8–34.3)
MCHC RBC AUTO-ENTMCNC: 31.9 G/DL (ref 31.4–37.4)
MCV RBC AUTO: 100 FL (ref 82–98)
MONOCYTES # BLD AUTO: 1.29 THOUSAND/ΜL (ref 0.17–1.22)
MONOCYTES NFR BLD AUTO: 11 % (ref 4–12)
NEUTROPHILS # BLD AUTO: 6.46 THOUSANDS/ΜL (ref 1.85–7.62)
NEUTS SEG NFR BLD AUTO: 57 % (ref 43–75)
NRBC BLD AUTO-RTO: 0 /100 WBCS
PHOSPHATE SERPL-MCNC: 4.4 MG/DL (ref 2.7–4.5)
PLATELET # BLD AUTO: 284 THOUSANDS/UL (ref 149–390)
PMV BLD AUTO: 8.4 FL (ref 8.9–12.7)
POTASSIUM SERPL-SCNC: 4.3 MMOL/L (ref 3.5–5.3)
PROCALCITONIN SERPL-MCNC: 3.38 NG/ML
PROT SERPL-MCNC: 7.6 G/DL (ref 6.4–8.2)
RBC # BLD AUTO: 3.84 MILLION/UL (ref 3.88–5.62)
SODIUM SERPL-SCNC: 137 MMOL/L (ref 136–145)
TROPONIN I SERPL-MCNC: 0.09 NG/ML
WBC # BLD AUTO: 11.43 THOUSAND/UL (ref 4.31–10.16)

## 2021-02-09 PROCEDURE — 90935 HEMODIALYSIS ONE EVALUATION: CPT | Performed by: INTERNAL MEDICINE

## 2021-02-09 PROCEDURE — 82948 REAGENT STRIP/BLOOD GLUCOSE: CPT

## 2021-02-09 PROCEDURE — 80197 ASSAY OF TACROLIMUS: CPT | Performed by: INTERNAL MEDICINE

## 2021-02-09 PROCEDURE — 84100 ASSAY OF PHOSPHORUS: CPT | Performed by: INTERNAL MEDICINE

## 2021-02-09 PROCEDURE — 5A1D70Z PERFORMANCE OF URINARY FILTRATION, INTERMITTENT, LESS THAN 6 HOURS PER DAY: ICD-10-PCS | Performed by: INTERNAL MEDICINE

## 2021-02-09 PROCEDURE — 83605 ASSAY OF LACTIC ACID: CPT | Performed by: INTERNAL MEDICINE

## 2021-02-09 PROCEDURE — 82550 ASSAY OF CK (CPK): CPT | Performed by: INTERNAL MEDICINE

## 2021-02-09 PROCEDURE — 84145 PROCALCITONIN (PCT): CPT | Performed by: STUDENT IN AN ORGANIZED HEALTH CARE EDUCATION/TRAINING PROGRAM

## 2021-02-09 PROCEDURE — 97166 OT EVAL MOD COMPLEX 45 MIN: CPT

## 2021-02-09 PROCEDURE — 83735 ASSAY OF MAGNESIUM: CPT | Performed by: INTERNAL MEDICINE

## 2021-02-09 PROCEDURE — 71045 X-RAY EXAM CHEST 1 VIEW: CPT

## 2021-02-09 PROCEDURE — 84484 ASSAY OF TROPONIN QUANT: CPT | Performed by: INTERNAL MEDICINE

## 2021-02-09 PROCEDURE — 94760 N-INVAS EAR/PLS OXIMETRY 1: CPT

## 2021-02-09 PROCEDURE — 99232 SBSQ HOSP IP/OBS MODERATE 35: CPT | Performed by: INTERNAL MEDICINE

## 2021-02-09 PROCEDURE — 80053 COMPREHEN METABOLIC PANEL: CPT | Performed by: INTERNAL MEDICINE

## 2021-02-09 PROCEDURE — 86738 MYCOPLASMA ANTIBODY: CPT | Performed by: INTERNAL MEDICINE

## 2021-02-09 PROCEDURE — 85025 COMPLETE CBC W/AUTO DIFF WBC: CPT | Performed by: INTERNAL MEDICINE

## 2021-02-09 PROCEDURE — 83036 HEMOGLOBIN GLYCOSYLATED A1C: CPT | Performed by: INTERNAL MEDICINE

## 2021-02-09 RX ORDER — DEXTROSE MONOHYDRATE 25 G/50ML
25 INJECTION, SOLUTION INTRAVENOUS ONCE
Status: COMPLETED | OUTPATIENT
Start: 2021-02-09 | End: 2021-02-09

## 2021-02-09 RX ORDER — DEXTROSE MONOHYDRATE 25 G/50ML
INJECTION, SOLUTION INTRAVENOUS
Status: COMPLETED
Start: 2021-02-09 | End: 2021-02-09

## 2021-02-09 RX ADMIN — HEPARIN SODIUM 5000 UNITS: 5000 INJECTION INTRAVENOUS; SUBCUTANEOUS at 21:25

## 2021-02-09 RX ADMIN — INSULIN LISPRO 5 UNITS: 100 INJECTION, SOLUTION INTRAVENOUS; SUBCUTANEOUS at 17:41

## 2021-02-09 RX ADMIN — HYDRALAZINE HYDROCHLORIDE 50 MG: 25 TABLET, FILM COATED ORAL at 15:08

## 2021-02-09 RX ADMIN — TACROLIMUS 2 MG: 1 CAPSULE ORAL at 08:25

## 2021-02-09 RX ADMIN — CARVEDILOL 6.25 MG: 3.12 TABLET, FILM COATED ORAL at 13:01

## 2021-02-09 RX ADMIN — NIFEDIPINE 90 MG: 30 TABLET, FILM COATED, EXTENDED RELEASE ORAL at 13:01

## 2021-02-09 RX ADMIN — HYDRALAZINE HYDROCHLORIDE 50 MG: 25 TABLET, FILM COATED ORAL at 21:24

## 2021-02-09 RX ADMIN — ASPIRIN 81 MG CHEWABLE TABLET 81 MG: 81 TABLET CHEWABLE at 13:01

## 2021-02-09 RX ADMIN — AZATHIOPRINE 50 MG: 50 TABLET ORAL at 13:02

## 2021-02-09 RX ADMIN — PREDNISONE 5 MG: 5 TABLET ORAL at 08:25

## 2021-02-09 RX ADMIN — DEXTROSE MONOHYDRATE 25 ML: 25 INJECTION, SOLUTION INTRAVENOUS at 05:56

## 2021-02-09 RX ADMIN — INSULIN DETEMIR 4 UNITS: 100 INJECTION, SOLUTION SUBCUTANEOUS at 12:59

## 2021-02-09 RX ADMIN — CARVEDILOL 6.25 MG: 3.12 TABLET, FILM COATED ORAL at 17:41

## 2021-02-09 RX ADMIN — HEPARIN SODIUM 5000 UNITS: 5000 INJECTION INTRAVENOUS; SUBCUTANEOUS at 15:08

## 2021-02-09 RX ADMIN — ISOSORBIDE MONONITRATE 30 MG: 30 TABLET, EXTENDED RELEASE ORAL at 13:02

## 2021-02-09 RX ADMIN — INSULIN LISPRO 1 UNITS: 100 INJECTION, SOLUTION INTRAVENOUS; SUBCUTANEOUS at 13:00

## 2021-02-09 RX ADMIN — INSULIN DETEMIR 4 UNITS: 100 INJECTION, SOLUTION SUBCUTANEOUS at 21:25

## 2021-02-09 RX ADMIN — INSULIN LISPRO 5 UNITS: 100 INJECTION, SOLUTION INTRAVENOUS; SUBCUTANEOUS at 21:26

## 2021-02-09 RX ADMIN — ATORVASTATIN CALCIUM 80 MG: 40 TABLET, FILM COATED ORAL at 17:40

## 2021-02-09 RX ADMIN — PANTOPRAZOLE SODIUM 40 MG: 40 TABLET, DELAYED RELEASE ORAL at 06:41

## 2021-02-09 RX ADMIN — HYDRALAZINE HYDROCHLORIDE 50 MG: 25 TABLET, FILM COATED ORAL at 05:19

## 2021-02-09 RX ADMIN — TACROLIMUS 1 MG: 1 CAPSULE ORAL at 21:24

## 2021-02-09 RX ADMIN — HEPARIN SODIUM 5000 UNITS: 5000 INJECTION INTRAVENOUS; SUBCUTANEOUS at 05:20

## 2021-02-09 NOTE — HEMODIALYSIS
Post-Dialysis RN Treatment Note    Blood Pressure:  Pre 166/72 mm/Hg  Post 168/74 mmHg   EDW not provided   Weight:  Pre 43 kg   Post 41 kg   Mode of weight measurement: Bed Scale   Volume Removed  2000 ml    Treatment duration 180 minutes    NS given  No    Treatment shortened? No   Medications given during Rx None Reported   Estimated Kt/V  1 74   Access type: AV fistula   Access Status: Right upper arm fistula cannulated with #16g needles x2 without difficulty  BFR to 350 d/t #16g needles being used  +bruit/thrill    Verbal report given to [facility] RN  Pt tolerated HD well

## 2021-02-09 NOTE — ASSESSMENT & PLAN NOTE
Lab Results   Component Value Date    HGBA1C 7 4 (H) 09/17/2020       Recent Labs     02/08/21  2145 02/09/21  0544 02/09/21  0607 02/09/21  0714   POCGLU 148* <20* 88 121       Blood Sugar Average: Last 72 hrs:  (P) 166 8   Home regimen: Insulin Levemir 10 Units BID and Lispro coverage as needed  Patient had hypoglycemia episode this morning BS <20, given D5 25ml   Continue with Levemir 4 U BID and ISS coverage Alg #1  Accucheck AC/HS

## 2021-02-09 NOTE — OCCUPATIONAL THERAPY NOTE
Occupational Therapy Evaluation     Patient Name: Glen MOSER Date: 2/9/2021  Problem List  Principal Problem:    Acute respiratory failure ([facility] )  Active Problems:    Type 1 diabetes mellitus ([facility] )    Renal transplant, status post    Hyperkalemia    Hypertension    ESRD (end stage renal disease) on dialysis ([facility] )    Dyspnea and respiratory abnormality    Pulmonary edema    Past Medical History  Past Medical History:   Diagnosis Date    Bacteremia 12/21/2018    CAD (coronary artery disease)     s/p MARC to LCx, pLAD 2/2018    Cardiac arrest ([facility] )     Chronic kidney disease     Diabetes mellitus type 1 ([facility] )     Dialysis patient ([facility] )     Access in right chest    GI bleed     Hyperlipidemia     Hypertension     Infection at site of external fixator pin ([place] [address] )     MI (myocardial infarction) ([place] [address] )     Pneumonia     Last Assessed [id number]    Renal failure     Renal transplant, status post 07/21/2007     Past Surgical History  Past Surgical History:   Procedure Laterality Date    AMPUTATION Right 02/2019    aboce knee    ANKLE FRACTURE SURGERY      ANKLE HARDWARE REMOVAL Right 7/31/2017    Procedure: REMOVAL HARDWARE ANKLE;  Surgeon: Shirin Pace MD;  Location: [facility];  Service: Orthopedics    ANKLE HARDWARE REMOVAL Right 8/17/2017    Procedure: TIBIA FAILED HARDWARE REMOVAL;  Surgeon: Shirin Pace MD;  Location: [facility];  Service: Orthopedics    CLOSED REDUCTION ANKLE Right 7/3/2017    Procedure: CLOSED REDUCTION DISTAL TIB-FIB AND CASTING VS;  Surgeon: Shirin Pace MD;  Location: [facility];  Service: Orthopedics    CORONARY ANGIOPLASTY WITH STENT PLACEMENT  02/2018    CAD s/p MARC to LCx, pLAD    ESOPHAGOGASTRODUODENOSCOPY N/A 12/20/2018    Procedure: ESOPHAGOGASTRODUODENOSCOPY (EGD) in ICU; Surgeon: Siva Vogt MD;  Location: [facility];   Service: Gastroenterology    EYE SURGERY      FRACTURE SURGERY      ORIF Rt Ankle    GLUTAMIC ACID DECARBOXYLASE (HISTORICAL)      IR AV FISTULAGRAM/GRAFTOGRAM  4/16/2020    IR PICC LINE  8/20/2018    IR TUNNELED DIALYSIS CATHETER CHECK/CHANGE/REPOSITION/ANGIOPLASTY  1/8/2020    IR TUNNELED DIALYSIS CATHETER PLACEMENT  10/21/2019    IR TUNNELED DIALYSIS CATHETER REMOVAL  5/29/2020    IR VENOUS LINE REMOVAL  10/5/2018    LEG AMPUTATION Right 02/01/2019    Above the knee    NEPHRECTOMY TRANSPLANTED ORGAN      ID ANASTOMOSIS,AV,ANY SITE Right 2/11/2020    Procedure: CREATION FISTULA ARTERIOVENOUS (AV) right upper extremity;  Surgeon: Sidney Zavala MD;  Location: [address] [place] [address] MAIN OR;  Service: Vascular    ID OPEN TREATMENT FRACTURE DISTAL TIBIA FIBULA Right 7/3/2017    Procedure: OPEN REDUCTION W/ INTERNAL FIXATION (ORIF); Surgeon: Harper Valenzuela MD;  Location: [facility];  Service: Orthopedics    TOE AMPUTATION Right 10/27/2016    Procedure: AMPUTATION TOE;  Surgeon: Lopez Pennington DPM;  Location: [facility];  Service:              02/09/21 1448   OT Last Visit   OT Visit Date 02/09/21   Note Type   Note type Evaluation   Restrictions/Precautions   Weight Bearing Precautions Per Order No   Other Precautions Telemetry;Multiple lines; Fall Risk   Pain Assessment   Pain Assessment Tool Pain Assessment not indicated - pt denies pain   Home Living   Type of [address] Two level;Performs ADLs on one level; Able to live on main level with bedroom/bathroom  (0 DENNIS; 8 steps to 2nd)   Bathroom Shower/Tub Walk-in shower   Bathroom Toilet Standard   Bathroom Equipment Shower chair;Commode   Bathroom Accessibility Accessible via wheelchair   [address] [facility]; Wheelchair-manual;Cane  (glucometer)   Additional Comments Pt reports WC at baseline   Prior Function   Level of [place] Independent with ADLs and functional mobility   Lives With Spouse   Receives Help From Family   ADL Assistance Independent   IADLs Independent   Comments Pt reports wife drives   Psychosocial   Psychosocial (WDL) WDL   Subjective   Subjective "I 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 the cleaning for my wife"   Bed Mobility   Supine to Sit 7  Independent   Sit to Supine 7  Independent   Additional Comments Pt supine at start and end of session   Transfers   Additional Comments Pt did not transfer this session due to not having WC and prosthesis with him; Pt reports (I) with transfers to [facility] at home   Balance   Static Sitting Fair +   Dynamic Sitting Fair +   Activity Tolerance   Activity Tolerance Patient tolerated treatment well   RUE Assessment   RUE Assessment WFL   LUE Assessment   LUE Assessment WFL   Hand Function   Gross Motor Coordination Functional   Fine Motor Coordination Functional   Sensation   Light Touch No apparent deficits   Cognition   Overall Cognitive Status WFL   Arousal/Participation Alert; Cooperative   Attention Within functional limits   Orientation Level Oriented X4   Memory Within functional limits   Following Commands Follows all commands and directions without difficulty   Comments Pt friendly and agreeable to participate in session   Assessment   Assessment Patient is a 45 y o  male admitted to [facility] on 2/8/2021 due to Acute respiratory failure ([facility] )  Comorbidities affecting pt's physical performance at time of assessment include DM, HTN and ESRD, pulmonary edema, hyperkalemia, history of right BKA  Patient has active OT orders and activity orders for Up and OOB as tolerated   Prior to admission pt was living independently in a house with wife  Patient independent w/ all ADLs, and functional mobility and driving   At the time of evaluation patient currently requires (I) for UB ADLs, (I) for LB ADLs, (I) for functional transfers, and (I) for functional mobility  Pt is performing at OF; No further acute OT needs identified at this time  Recommend continued mobilization with hospital staff and restorative services while in the hospital to increase pts endurance and strength upon D/C   From OT standpoint, recommend D/C to home with family support when medically cleared  D/C pt from OT caseload at this time  Goals   Patient Goals to get home    Plan   Goal Expiration Date 02/23/21   OT Frequency Eval only   Recommendation   OT Discharge Recommendation Return to previous environment with no needs   OT - OK to Discharge Yes  (When medically cleared)   Additional Comments  Pt's raw score on the AM-PAC Daily Activity short form is 24, standardized score is 57 54  Pts at this level are likely to benefit from DC to home with no needs, however, therapist recommendation is home with no needs  Pt's raw score on the AM-[facility] Applied Cognition inpatient short form is 24, standardized score is 62 21  Pts at this level are likely to benefit from DC to home with no needs, however, therapist recommendation is home with no needs  AM-PAC Daily Activity Inpatient   Lower Body Dressing 4   Bathing 4   Toileting 4   Upper Body Dressing 4   Grooming 4   Eating 4   Daily Activity Raw Score 24   Daily Activity Standardized Score (Calc for Raw Score >=11) 57 54   AM-PAC Applied Cognition Inpatient   Following a Speech/Presentation 4   Understanding Ordinary Conversation 4   Taking Medications 4   Remembering Where Things Are Placed or Put Away 4   Remembering List of 4-5 Errands 4   Taking Care of Complicated Tasks 4   Applied Cognition Raw Score 24   Applied Cognition Standardized Score 62 21     Pt is performing at OF; OT services will be discontinued at this time  Pt left supine in bed at end of session; all needs within reach; all lines intact

## 2021-02-09 NOTE — ASSESSMENT & PLAN NOTE
Patient reports short of breath started this morning around 4:00 a m  While sleeping  Baseline no supplemental oxygen at home  Placed on CPAP en route to ED  Placed on BiPAP at ED     · Likely secondary to diastolic dysfunction from renal failure and ESRD on HD; d/c antibiotic as suspicion of pneumonia is low  · Transitioned from BiPAP to HF nasal cannula, currently on HF 20 LPM, FiO2 35% saturating 96%

## 2021-02-09 NOTE — PHYSICAL THERAPY NOTE
PHYSICAL THERAPY          Patient Name: Morales DISLA Date: 2/9/2021     Order received and chart review performed  Per OTS evaluation, pt performing functional mobility at baseline level has has no concerns about his mobility at this time  Skilled PT intervention not indicated at this time; will d/c PT orders      Raven Velasco PT

## 2021-02-09 NOTE — PLAN OF CARE
Problem: Potential for Falls  Goal: Patient will remain free of falls  Description: INTERVENTIONS:  - Assess patient frequently for physical needs  -  Identify physical deficits that affect risk of falls    -  [place] fall precautions   - Educate patient on patient safety   - Instruct patient to call for assistance with activity   - Modify environment to reduce risk of injury  - Consider OT/PT consult to assist with strengthening/mobility  Outcome: Progressing     Problem: PAIN - ADULT  Goal: Verbalizes/displays adequate comfort level or baseline comfort level  Description: Interventions:  - Encourage patient to monitor pain and request assistance  - Assess pain using 0-10 pain scale  - Administer analgesics based on type and severity of pain and evaluate response  - Implement non-pharmacological measures as appropriate and evaluate response  - Notify physician/advanced practitioner if interventions unsuccessful or patient reports new pain  Outcome: Progressing     Problem: INFECTION - ADULT  Goal: Absence or prevention of progression during hospitalization  Description: INTERVENTIONS:  - Assess and monitor for signs and symptoms of infection  - Monitor WBC  - Monitor temp  - Monitor Procalcitonin  - Monitor all insertion sites  - [place] appropriate cooling/warming therapies per order  - Administer medications as ordered  - Instruct and encourage patient and family to use good hand hygiene technique  Outcome: Progressing     Problem: DISCHARGE PLANNING  Goal: Discharge to home or other facility with appropriate resources  Description: INTERVENTIONS:  - Identify barriers to discharge w/patient   - Arrange for needed discharge resources and transportation   - Identify discharge learning needs (meds, wound care, etc )  - Refer to Case Management Department for coordinating discharge planning if the patient needs post-hospital services based on physician/advanced practitioner order or complex needs related to functional status, cognitive ability, or social support system  Outcome: Progressing     Problem: Knowledge Deficit  Goal: Patient/family/caregiver demonstrates understanding of disease process, treatment plan, medications, and discharge instructions  Description: Complete learning assessment and assess knowledge base    Interventions:  - Provide teaching at level of understanding  - Provide teaching via preferred learning methods  Outcome: Progressing     Problem: RESPIRATORY - ADULT  Goal: Achieves optimal ventilation and oxygenation  Description: INTERVENTIONS:  - Assess for changes in respiratory status  - Assess for changes in mentation and behavior  - Position to facilitate oxygenation and minimize respiratory effort  - Oxygen administered by appropriate delivery if ordered  - Encourage broncho-pulmonary hygiene including cough, deep breathe, Incentive Spirometry  - Assess and instruct to report SOB or any respiratory difficulty  - Respiratory Therapy support as indicated  Outcome: Progressing     Problem: METABOLIC, FLUID AND ELECTROLYTES - ADULT  Goal: Electrolytes maintained within normal limits  Description: INTERVENTIONS:  - Monitor labs and assess patient for signs and symptoms of electrolyte imbalances  - Administer electrolyte replacement as ordered  - Monitor response to electrolyte replacements, including repeat lab results as appropriate  - Instruct patient on fluid and nutrition as appropriate  - Dialysis as ordered  Outcome: Progressing  Goal: Fluid balance maintained  Description: INTERVENTIONS:  - Monitor labs   - Monitor I/O and WT  - Instruct patient on fluid and nutrition as appropriate  - Assess for signs & symptoms of volume excess or deficit  -Dialysis as ordered  Outcome: Progressing  Goal: Glucose maintained within target range  Description: INTERVENTIONS:  - Monitor Blood Glucose as ordered  - Assess for signs and symptoms of hyperglycemia and hypoglycemia  - Administer ordered medications to maintain glucose within target range  - Assess nutritional intake and initiate nutrition service referral as needed  Outcome: Progressing

## 2021-02-09 NOTE — SPEECH THERAPY NOTE
Speech/Language Pathology Note    Patient Name: Glen MARTIN Date: 2/9/2021     Consult received and chart reviewed  Per chart review and discussion with RN, pt passed RN dysphagia assessment and is tolerating regular diet with thin liquids with no s/s of aspiration  Speech/Language deficits also denied  Formal speech/swallow evaluation does not appear to be indicated at this time  If new concerns arise, please reconsult  Thank you

## 2021-02-09 NOTE — UTILIZATION REVIEW
Initial Clinical Review    Admission: Date/Time/Statement:   Admission Orders (From admission, onward)     Ordered        02/08/21 0717  Inpatient Admission  Once                   Orders Placed This Encounter   Procedures    Inpatient Admission     Standing Status:   Standing     Number of Occurrences:   1     Order Specific Question:   Level of Care     Answer:   Critical Care [15]     Order Specific Question:   Estimated length of stay     Answer:   More than 2 Midnights     Order Specific Question:   Certification     Answer:   I certify that inpatient services are medically necessary for this patient for a duration of greater than two midnights  See H&P and MD Progress Notes for additional information about the patient's course of treatment  ED Arrival Information     Expected Arrival Acuity Means of Arrival Escorted By Service Admission Type    - 2/8/2021 05:51 Emergent Ambulance [facility]) Hospitalist Emergency    Arrival Complaint    -        Chief Complaint   Patient presents with    Shortness of Breath     increased SOB since this am, had dialysis M/W/F feels he needs his treatment  Assessment/Plan:   45 yom to ER from home via ems c/o increased SOB/MARSH  Hx CAD status post MARC to left circumflex and LAD, end-stage renal disease on HD M-W-F, hypertension, hyperlipidemia, type 1 diabetes, status post renal transplant with right nephrectomy in 2019, status post right above knee amputation, 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 in high school  Presents on CPAP from ems, placed on BIPAP in ER, hypertensive, tachypneic, I&E crackles RLL>RUL  Admission work-up showing leukocytosis, hyperkalemia, elevated BNP, procalcitonin, +troponin, pulm edema & pneumonia on imaging  Admitted to inpatient status for acute respiratory failure 2nd pneumonia, on BIPAP  O2 weaned to [address] @ 20lpm 2/9      ED Triage Vitals   Temperature Pulse Respirations Blood Pressure SpO2 02/08/21 0551 02/08/21 0551 02/08/21 0551 02/08/21 0551 02/08/21 0551   98 1 °F (36 7 °C) 77 (!) 24 (!) 231/98 95 %      Temp Source Heart Rate Source Patient Position - Orthostatic VS BP Location FiO2 (%)   02/08/21 0551 02/08/21 0551 02/08/21 0551 02/08/21 0551 02/08/21 0551   Temporal Monitor Sitting Left arm 70      Pain Score       02/08/21 1005       No Pain          Wt Readings from Last 1 Encounters:   02/09/21 41 6 kg (91 lb 11 4 oz)     Additional Vital Signs:   02/08/21 0845  --  74  24Abnormal   179/79Abnormal   114  91 %  --  --  --  --  --   --  Sitting   O2 Device: bipap at 02/08/21 0845   02/08/21 0815  --  72  24Abnormal   181/84Abnormal   120  92 %  --  --  --  --  --   --  Sitting   O2 Device: bipap at 02/08/21 0815   02/08/21 0748  --  --  --  --  --  --  --  --  --  --  --  Full face mask  --   02/08/21 0745  --  73  26Abnormal   196/90Abnormal   --  99 %  --  --  --  --  --   --  Sitting   O2 Device: bipap at 02/08/21 0745   02/08/21 0658  --  --  --  --  --  --  --  --  70 L/min  --  Other (comment)  --  --   02/08/21 0645  --  --  30Abnormal   173/81Abnormal   116  98 %  --  --  --  --  Other (comment)   --  Lying   O2 Device: bipap at 02/08/21 0645   02/08/21 0600  --  --  --  --  --  --  --  --  --  --  --  Full face mask  --   02/08/21 0551  98 1 °F (36 7 °C)  77  24Abnormal   231/98Abnormal   --  95 %  70  --  --  --  Other (comment)   --  Sitting     Pertinent Labs/Diagnostic Test Results:   Results from last 7 days   Lab Units 02/08/21  0727   SARS-COV-2  Negative     Results from last 7 days   Lab Units 02/09/21  0531 02/08/21  0554   WBC Thousand/uL 11 43* 12 01*   HEMOGLOBIN g/dL 12 3 12 9   HEMATOCRIT % 38 5 39 9   PLATELETS Thousands/uL 284 260   NEUTROS ABS Thousands/µL 6 46 9 02*     Results from last 7 days   Lab Units 02/09/21  0531 02/08/21  0554   SODIUM mmol/L 137 139   POTASSIUM mmol/L 4 3 5 4*   CHLORIDE mmol/L 101 102   CO2 mmol/L 24 21   ANION GAP mmol/L 12 16*   BUN mg/dL 30* 71*   CREATININE mg/dL 4 51* 7 65*   EGFR ml/min/1 73sq m 14 7   CALCIUM mg/dL 9 6 9 7   MAGNESIUM mg/dL 2 5 3 0*   PHOSPHORUS mg/dL 4 4  --      Results from last 7 days   Lab Units 02/09/21  0531 02/08/21  0554   AST U/L 21 12   ALT U/L 23 25   ALK PHOS U/L 100 140*   TOTAL PROTEIN g/dL 7 6 8 0   ALBUMIN g/dL 3 3* 3 8   TOTAL BILIRUBIN mg/dL 1 20* 0 90     Results from last 7 days   Lab Units 02/09/21  0714 02/09/21  0607 02/09/21  0544 02/08/21  2145 02/08/21  1535 02/08/21  1115   POC GLUCOSE mg/dl 121 88 <20* 148* 203* 274*     Results from last 7 days   Lab Units 02/09/21  0531 02/08/21  0554   GLUCOSE RANDOM mg/dL 25* 239*     BETA-HYDROXYBUTYRATE   Date Value Ref Range Status   12/01/2019 0 3 <0 6 mmol/L Final      Results from last 7 days   Lab Units 02/08/21  0755   PH ART  7 363   PCO2 ART mm Hg 35 7*   PO2 ART mm Hg 132 1*   HCO3 ART mmol/L 19 9*   BASE EXC ART mmol/L -4 9   O2 CONTENT ART mL/dL 16 7   O2 HGB, ARTERIAL % 97 8*   ABG SOURCE  Radial, Left     Results from last 7 days   Lab Units 02/08/21  0727   PH JAMES  7 338   PCO2 JAMES mm Hg 39 5*   PO2 JAMES mm Hg 78 7*   HCO3 JAMES mmol/L 20 7*   BASE EXC JAMES mmol/L -4 7   O2 CONTENT JAMES ml/dL 15 4   O2 HGB, VENOUS % 92 1*     Results from last 7 days   Lab Units 02/09/21  0531   CK TOTAL U/L 56     Results from last 7 days   Lab Units 02/09/21  0531 02/08/21  0554   TROPONIN I ng/mL 0 09* <0 02     Results from last 7 days   Lab Units 02/08/21  0554   PROTIME seconds 13 1   INR  1 01   PTT seconds 43*     Results from last 7 days   Lab Units 02/08/21  0554   PROCALCITONIN ng/ml 0 58*     Results from last 7 days   Lab Units 02/09/21  0531 02/08/21  0554   LACTIC ACID mmol/L 0 6 1 4     Results from last 7 days   Lab Units 02/08/21  0554   NT-PRO BNP pg/mL 24,494*     Results from last 7 days   Lab Units 02/08/21  0554   LIPASE u/L 40*     Results from last 7 days   Lab Units 02/08/21  0727   INFLUENZA A PCR  Negative   INFLUENZA B PCR  Negative RSV PCR  Negative     Results from last 7 days   Lab Units 02/08/21  0604   BLOOD CULTURE  Received in Microbiology Lab  Culture in Progress  Received in Microbiology Lab  Culture in Progress  2/8  Cxr=  Pulmonary edema with severe right pneumonia  Ct chest=  Severe bilateral consolidation, greater on the right, with trace effusions  This is likely due to asymmetric pulmonary edema, given the history of dialysis and no signs of infection  Trace perihepatic ascites  Diffuse bone sclerosis related to renal osteodystrophy    Ekg=  Normal sinus rhythm  Possible Left atrial enlargement  Left ventricular hypertrophy  Nonspecific T wave abnormality    ED Treatment:   Medication Administration from 02/08/2021 0551 to 02/08/2021 1000       Date/Time Order Dose Route Action     02/08/2021 0600 albuterol inhalation solution 10 mg 10 mg Nebulization Given     02/08/2021 0655 cefepime (MAXIPIME) IVPB (premix in dextrose) 2,000 mg 50 mL 2,000 mg Intravenous New Bag     02/08/2021 0807 vancomycin 750 mg in sodium chloride 0 9% 250 mL 750 mg Intravenous New Bag     02/08/2021 0912 hydrALAZINE (APRESOLINE) tablet 50 mg 50 mg Oral Not Given        Past Medical History:   Diagnosis Date    Bacteremia 12/21/2018    CAD (coronary artery disease)     s/p MARC to LCx, pLAD 2/2018    [facility] arrest ([facility] )     Chronic kidney disease     Diabetes mellitus type 1 ([facility] )     Dialysis patient [facility] )     Access in right chest    GI bleed     Hyperlipidemia     Hypertension     Infection at site of external fixator pin ([facility] )     MI (myocardial infarction) ([facility] )     Pneumonia     Last Assessed [id number]    Renal failure     Renal transplant, status post 07/21/2007     Present on Admission:   Dyspnea and respiratory abnormality   Type 1 diabetes mellitus ([facility] )   Hypertension   Acute pulmonary edema (HCC)    Admitting Diagnosis: SOB (shortness of breath) [R06 02]  Dyspnea and respiratory abnormality [R06 00, R06 89]  ESRD (end stage renal disease) on dialysis ([facility] ) [N18 6, Z99 2]  Acute respiratory failure, unspecified whether with hypoxia or hypercapnia ([facility] ) [J96 00]  Age/Sex: 45 y o  male  Admission Orders:  Cont pulse ox  Consult pulmonary  Consult renal  Telemetry  accuchecks with coverage scale  Elevate HOB 30 degrees  Aspiration precautions  Incentive spirometry  scd  Pt/ot/st eval & tx  HD 3x/wk    Scheduled Medications:  aspirin, 81 mg, Oral, QAM  atorvastatin, 80 mg, Oral, QPM  azaTHIOprine, 50 mg, Oral, QAM  carvedilol, 6 25 mg, Oral, BID With Meals  glucose, 16 g, Oral, Once  heparin (porcine), 5,000 Units, Subcutaneous, Q8H [address]  hydrALAZINE, 50 mg, Oral, Q8H [address]  insulin detemir, 8 Units, Subcutaneous, Q12H JAMARCUS  insulin lispro, 1-5 Units, Subcutaneous, TID AC  insulin lispro, 1-5 Units, Subcutaneous, HS  isosorbide mononitrate, 30 mg, Oral, Daily  metoclopramide, 5 mg, Intravenous, 4x Daily (AC & HS)  NIFEdipine, 90 mg, Oral, Daily  pantoprazole, 40 mg, Oral, Daily Before Breakfast  predniSONE, 5 mg, Oral, Daily  tacrolimus, 2 mg, Oral, Daily    And  tacrolimus, 1 mg, Oral, HS    PRN Meds:  acetaminophen, 650 mg, Oral, Q6H PRN  albuterol, 2 5 mg, Nebulization, Q6H PRN  HYDROmorphone, 0 2 mg, Intravenous, Q4H PRN    Or  HYDROmorphone, 0 5 mg, Intravenous, Q4H PRN  Labetalol HCl, 10 mg, Intravenous, Q4H PRN  ondansetron, 4 mg, Intravenous, Q4H PRN  sodium chloride (PF), 3 mL, Intravenous, Q1H PRN    Network Utilization Review Department  ATTENTION: Please call with any questions or concerns to [phone] and carefully listen to the prompts so that you are directed to the right person  All voicemails are confidential   Peterson Fernandez all requests for admission clinical reviews, approved or denied determinations and any other requests to dedicated fax number below belonging to the campus where the patient is receiving treatment   List of dedicated fax numbers for the Facilities:  FACILITY NAME UR FAX NUMBER   ADMISSION DENIALS (Administrative/Medical Necessity) [phone]   [address] (Maternity/NICU/Pediatrics) 261 [facility],[address] [facility] 40 [facility]  [phone]   [address] (Gloria Kaur "Hannah" 103) [address] [place] [address] [facility] [phone]   [place] [address] [phone]

## 2021-02-09 NOTE — ASSESSMENT & PLAN NOTE
Patient presented with acute dyspnea while sleeping  Denied any chest pain, cough, fever, upper respiratory symptoms     · WBC 11 43, L A 0 6  · Procalcitonin 0 58 (2/8), trend level  · Blood culture x 2 pending, mycoplasma pneumoniae IgG pending  · CXR: severe right sided pulmonary edema with infiltrate in right midlung and right base  · ED: Cefepime 2 g and Vancomycin 15 mg/kg x 1  · Antibiotic was discontinued due to low suspicion of pneumonia  · Pulmonology recommendation appreciated  · COVID testing negative  · Continue hemodialysis performed on 2/8, next scheduled on 2/9

## 2021-02-09 NOTE — PROGRESS NOTES
On assessment, pt was very sweaty, his hands were shaky, and was not answering questions appropriately  BG check showed <20  Per hypoglycemia protocol, pt given 25mL dextrose 50% IV solution  BG 88 on recheck  Patient provided cereal for a snack as requested   Yifan MANCIA aware

## 2021-02-09 NOTE — NURSING NOTE
Pt's blood glucose 148  Pt was only willing to receive 4 units out of the scheduled 8 units of Levemir  Pt also refusing MRSA swab  Kasey MANCIA made aware

## 2021-02-09 NOTE — RESPIRATORY THERAPY NOTE
Pt taken off 3L NC and placed on RA  SpO2 95%, pt offering no respiratory complaints  RN aware  Vapotherm and Bipap pulled from room and order discontinued

## 2021-02-09 NOTE — ASSESSMENT & PLAN NOTE
Lab Results   Component Value Date    EGFR 14 02/09/2021    EGFR 7 02/08/2021    EGFR 16 10/18/2020    CREATININE 4 51 (H) 02/09/2021    CREATININE 7 65 (H) 02/08/2021    CREATININE 4 01 (H) 10/18/2020     Patient was previously on HD M W F, switched ton Tues, Thurs, and Sat  Nephrology recommendation appreciated, hemodialysis performed on 2/8, next scheduled on 2/9   Baseline Cr fluctuate between 3 5-7 4  Estimated Creatinine Clearance: 11 4 mL/min (A) (by C-G formula based on SCr of 4 51 mg/dL (H))

## 2021-02-09 NOTE — PROGRESS NOTES
Progress Note - Dot Oar 1970, 45 y o  male MRN: [id number]    Unit/Bed#:  Encounter: [id number]    Primary Care Provider: Smiley Stallworth DO   Date and time admitted to hospital: 2/8/2021  5:53 AM        * Acute respiratory failure [facility])  Assessment & Plan  Patient reports short of breath started this morning around 4:00 a m  While sleeping  Baseline no supplemental oxygen at home  Placed on CPAP en route to ED  Placed on BiPAP at ED  · Likely secondary to diastolic dysfunction from renal failure and ESRD on HD; d/c antibiotic as suspicion of pneumonia is low  · Transitioned from BiPAP to HF nasal cannula, currently on HF 20 LPM, FiO2 35% saturating 96%      Pulmonary edema  Assessment & Plan  Patient presented with acute dyspnea while sleeping  Denied any chest pain, cough, fever, upper respiratory symptoms  · WBC 11 43, L A 0 6  · Procalcitonin 0 58 (2/8), trend level  · Blood culture x 2 pending, mycoplasma pneumoniae IgG pending  · CXR: severe right sided pulmonary edema with infiltrate in right midlung and right base  · ED: Cefepime 2 g and Vancomycin 15 mg/kg x 1  · Antibiotic was discontinued due to low suspicion of pneumonia  · Pulmonology recommendation appreciated  · COVID testing negative  · Continue hemodialysis performed on 2/8, next scheduled on 2/9    ESRD (end stage renal disease) on dialysis [facility])  Assessment & Plan  Lab Results   Component Value Date    EGFR 14 02/09/2021    EGFR 7 02/08/2021    EGFR 16 10/18/2020    CREATININE 4 51 (H) 02/09/2021    CREATININE 7 65 (H) 02/08/2021    CREATININE 4 01 (H) 10/18/2020     Patient was previously on HD M W F, switched ton Tues, Thurs, and Sat  Nephrology recommendation appreciated, hemodialysis performed on 2/8, next scheduled on 2/9   Baseline Cr fluctuate between 3 5-7 4  Estimated Creatinine Clearance: 11 4 mL/min (A) (by C-G formula based on SCr of 4 51 mg/dL (H))       Type 1 diabetes mellitus ([facility] [address] )  Assessment & Plan  Lab Results   Component Value Date    HGBA1C 7 4 (H) 09/17/2020       Recent Labs     02/08/21  2145 02/09/21  0544 02/09/21  0607 02/09/21  0714   POCGLU 148* <20* 88 121       Blood Sugar Average: Last 72 hrs:  (P) 166 8   Home regimen: Insulin Levemir 10 Units BID and Lispro coverage as needed  Patient had hypoglycemia episode this morning BS <20, given D5 25ml   Continue with Levemir 4 U BID and ISS coverage Alg #1  Accucheck AC/HS    Renal transplant, status post  Assessment & Plan  Patient had right sided nephrectomy many years ago, started hemodialysis since Nov 2019  Patient is chronically on Azathioprine, Tacrolimus, and Prednisone     Hypertension  Assessment & Plan  BP severely elevated on arrival with systolic 165V, trending down gradually  · Continue Home medication Hydralazine 50 mg PO Q8H  · Labetalol IV 10 mg Q4H PRN for SBP >170 or DBP >100  · BP monitoring    Hyperkalemia  Assessment & Plan  K+ of 5 4 on admission, monitor BMP, if persist may utilize kayexalate  K+ 4 4 today        Subjective:   Patient seen and examined at bedside  Patient had hypoglycemic episode this morning with BS <20 received D5 25mL improved to 88 mg/dL  Decreased Levemir from 8 U to 4U BID  Patient currently on high flow NC 20 LPM, FiO2 35% saturating 96%  Hemodynamically stable  Patient is to repeat HD today  Objective:     Vitals: Blood pressure 170/74, pulse 83, temperature 97 8 °F (36 6 °C), temperature source Temporal, resp  rate 22, height 5' 4" (1 626 m), weight 41 6 kg (91 lb 11 4 oz), SpO2 100 %  ,Body mass index is 15 74 kg/m²  Wt Readings from Last 3 Encounters:   02/09/21 41 6 kg (91 lb 11 4 oz)   11/22/20 48 8 kg (107 lb 9 4 oz)   10/18/20 49 6 kg (109 lb 5 6 oz)       Intake/Output Summary (Last 24 hours) at 2/9/2021 1026  Last data filed at 2/9/2021 3421  Gross per 24 hour   Intake 850 ml   Output 3500 ml   Net -2650 ml       Physical Exam:     Physical Exam  Vitals signs reviewed     Constitutional: General: He is not in acute distress  Appearance: He is ill-appearing  HENT:      Head: Normocephalic  Eyes:      General: No scleral icterus  Right eye: No discharge  Left eye: No discharge  Cardiovascular:      Rate and Rhythm: Normal rate  Heart sounds: No murmur  Pulmonary:      Effort: Pulmonary effort is normal  No respiratory distress  Comments: Inspiratory crackles in right upper and lower lung fields improved from yesterday exam      Currently on high flow NC 20 LPM 35% saturating 96%  Abdominal:      General: Abdomen is flat  Bowel sounds are normal  There is no distension  Tenderness: There is no abdominal tenderness  Musculoskeletal:      Left lower leg: No edema  Comments: Right above knee amputation   Skin:     General: Skin is warm  Neurological:      Mental Status: He is alert and oriented to person, place, and time     Psychiatric:         Behavior: Behavior normal          Recent Results (from the past 24 hour(s))   Fingerstick Glucose (POCT)    Collection Time: 02/08/21 11:15 AM   Result Value Ref Range    POC Glucose 274 (H) 65 - 140 mg/dl   Fingerstick Glucose (POCT)    Collection Time: 02/08/21  3:35 PM   Result Value Ref Range    POC Glucose 203 (H) 65 - 140 mg/dl   Fingerstick Glucose (POCT)    Collection Time: 02/08/21  9:45 PM   Result Value Ref Range    POC Glucose 148 (H) 65 - 140 mg/dl   CBC and differential    Collection Time: 02/09/21  5:31 AM   Result Value Ref Range    WBC 11 43 (H) 4 31 - 10 16 Thousand/uL    RBC 3 84 (L) 3 88 - 5 62 Million/uL    Hemoglobin 12 3 12 0 - 17 0 g/dL    Hematocrit 38 5 36 5 - 49 3 %     (H) 82 - 98 fL    MCH 32 0 26 8 - 34 3 pg    MCHC 31 9 31 4 - 37 4 g/dL    RDW 14 4 11 6 - 15 1 %    MPV 8 4 (L) 8 9 - 12 7 fL    Platelets 706 932 - 131 Thousands/uL    nRBC 0 /100 WBCs    Neutrophils Relative 57 43 - 75 %    Immat GRANS % 0 0 - 2 %    Lymphocytes Relative 28 14 - 44 %    Monocytes Relative 11 4 - 12 %    Eosinophils Relative 3 0 - 6 %    Basophils Relative 1 0 - 1 %    Neutrophils Absolute 6 46 1 85 - 7 62 Thousands/µL    Immature Grans Absolute 0 04 0 00 - 0 20 Thousand/uL    Lymphocytes Absolute 3 25 0 60 - 4 47 Thousands/µL    Monocytes Absolute 1 29 (H) 0 17 - 1 22 Thousand/µL    Eosinophils Absolute 0 29 0 00 - 0 61 Thousand/µL    Basophils Absolute 0 10 0 00 - 0 10 Thousands/µL   CK    Collection Time: 02/09/21  5:31 AM   Result Value Ref Range    Total CK 56 39 - 308 U/L   Comprehensive metabolic panel    Collection Time: 02/09/21  5:31 AM   Result Value Ref Range    Sodium 137 136 - 145 mmol/L    Potassium 4 3 3 5 - 5 3 mmol/L    Chloride 101 100 - 108 mmol/L    CO2 24 21 - 32 mmol/L    ANION GAP 12 4 - 13 mmol/L    BUN 30 (H) 5 - 25 mg/dL    Creatinine 4 51 (H) 0 60 - 1 30 mg/dL    Glucose 25 (LL) 65 - 140 mg/dL    Calcium 9 6 8 3 - 10 1 mg/dL    Corrected Calcium 10 2 (H) 8 3 - 10 1 mg/dL    AST 21 5 - 45 U/L    ALT 23 12 - 78 U/L    Alkaline Phosphatase 100 46 - 116 U/L    Total Protein 7 6 6 4 - 8 2 g/dL    Albumin 3 3 (L) 3 5 - 5 0 g/dL    Total Bilirubin 1 20 (H) 0 20 - 1 00 mg/dL    eGFR 14 ml/min/1 73sq m   Magnesium    Collection Time: 02/09/21  5:31 AM   Result Value Ref Range    Magnesium 2 5 1 6 - 2 6 mg/dL   Phosphorus    Collection Time: 02/09/21  5:31 AM   Result Value Ref Range    Phosphorus 4 4 2 7 - 4 5 mg/dL   Lactic acid, plasma    Collection Time: 02/09/21  5:31 AM   Result Value Ref Range    LACTIC ACID 0 6 0 5 - 2 0 mmol/L   Troponin I    Collection Time: 02/09/21  5:31 AM   Result Value Ref Range    Troponin I 0 09 (H) <=0 04 ng/mL   Fingerstick Glucose (POCT)    Collection Time: 02/09/21  5:44 AM   Result Value Ref Range    POC Glucose <20 (LL) 65 - 140 mg/dl   Fingerstick Glucose (POCT)    Collection Time: 02/09/21  6:07 AM   Result Value Ref Range    POC Glucose 88 65 - 140 mg/dl   Fingerstick Glucose (POCT)    Collection Time: 02/09/21  7:14 AM   Result Value Ref Range    POC Glucose 121 65 - 140 mg/dl       Current Facility-Administered Medications   Medication Dose Route Frequency Provider Last Rate Last Admin    acetaminophen (TYLENOL) tablet 650 mg  650 mg Oral Q6H PRN Fatimah Garrett DO        albuterol inhalation solution 2 5 mg  2 5 mg Nebulization Q6H PRN Fatimah Garrett DO        aspirin chewable tablet 81 mg  81 mg Oral QAM Kushal Nye MD        atorvastatin (LIPITOR) tablet 80 mg  80 mg Oral QPM Kushal Nye MD   80 mg at 02/08/21 2142    azaTHIOprine (IMURAN) tablet 50 mg  50 mg Oral QAM Kushal Nye MD        carvedilol (COREG) tablet 6 25 mg  6 25 mg Oral BID With Meals Fatimah Garrett DO        glucose chewable tablet 16 g  16 g Oral Once Fatimah Garrett DO        heparin (porcine) subcutaneous injection 5,000 Units  5,000 Units Subcutaneous [facility] Kushal Nye MD   5,000 Units at 02/09/21 0520    hydrALAZINE (APRESOLINE) tablet 50 mg  50 mg Oral [facility] Kushal Nye MD   50 mg at 02/09/21 0519    HYDROmorphone (DILAUDID) injection 0 2 mg  0 2 mg Intravenous Q4H PRN Fatimah Garrett DO        Or    HYDROmorphone (DILAUDID) injection 0 5 mg  0 5 mg Intravenous Q4H PRN Fatimah Garrett DO        insulin detemir (LEVEMIR) subcutaneous injection 4 Units  4 Units Subcutaneous Q12H [address] Kushal Nye MD        insulin lispro (HumaLOG) 100 units/mL subcutaneous injection 1-5 Units  1-5 Units Subcutaneous TID [facility], DO   1 Units at 02/08/21 1641    insulin lispro (HumaLOG) 100 units/mL subcutaneous injection 1-5 Units  1-5 Units Subcutaneous HS Fatimah Garrett DO        isosorbide mononitrate (IMDUR) 24 hr tablet 30 mg  30 mg Oral Daily Kushal Nye MD        Labetalol HCl (NORMODYNE) injection 10 mg  10 mg Intravenous Q4H PRN Fatimah Garrett DO   10 mg at 02/08/21 2027    metoclopramide (REGLAN) injection 5 mg  5 mg Intravenous 4x Daily (AC & HS) Fatimah Garrett, DO   5 mg at 02/08/21 1703    NIFEdipine (PROCARDIA XL) 24 hr tablet 90 mg  90 mg Oral Daily Kushal Osei MD        ondansetron [facility]) injection 4 mg  4 mg Intravenous Q4H PRN Ayla Madrid DO   4 mg at 02/08/21 1059    pantoprazole (PROTONIX) EC tablet 40 mg  40 mg Oral Daily Before Breakfast Kushal Osei MD   40 mg at 02/09/21 0641    predniSONE tablet 5 mg  5 mg Oral Daily Kushal Osei MD   5 mg at 02/09/21 0825    sodium chloride (PF) 0 9 % injection 3 mL  3 mL Intravenous Q1H PRN Kushal Osei MD        tacrolimus (PROGRAF) capsule 2 mg  2 mg Oral Daily Kushal Osei MD   2 mg at 02/09/21 0825    And    tacrolimus (PROGRAF) capsule 1 mg  1 mg Oral HS Kushal Osei MD   1 mg at 02/08/21 5859       Invasive Devices     Peripheral Intravenous Line            Peripheral IV 02/08/21 Left Antecubital 1 day    Peripheral IV 02/08/21 Left Wrist 1 day          Line            Hemodialysis AV Fistula Right Upper arm -- days                Lab, Imaging and other studies: I have personally reviewed pertinent reports      VTE Pharmacologic Prophylaxis: Heparin  VTE Mechanical Prophylaxis: sequential compression device    Kushal Osei MD

## 2021-02-09 NOTE — RESPIRATORY THERAPY NOTE
Pt taken off vapotherm and placed 3L NC  Florance Ladi ICU RN and [facility] RN both aware   Will continue to monitor and titrate to RA

## 2021-02-09 NOTE — PROGRESS NOTES
Progress Note - Nephrology   Shima Fuentes 45 y o  male MRN: [id number]  Unit/Bed#:  Encounter: [id number]    HEMODIALYSIS PROCEDURE NOTE  The patient was seen and examined on hemodialysis  Time: 3 hours  Sodium: 138 Blood flow: 400   Dialyzer: F160 Potassium: 3 Dialysate flow: 400   Access: Av fistula Bicarbonate: 30 Ultrafiltration goal: 2L   Medications on HD: none         A/P:  1  End-stage renal disease               status post hemodialysis yesterday, February 8th, patient has significant volume removed during that treatment, will try to remove an additional 2 L today  This may ultimately drive down the patient's estimated dry weight further down which is likely appropriate  Next hemodialysis session will be tomorrow, February 10th which will put him back on his regular Monday Wednesday Friday schedule  He will only be 2 hour session however  2  Secondary hyperparathyroidism              continue with phosphorus binders, PTH checked in the outpatient setting only  3  Anemia of chronic kidney disease               continue monitor hemoglobin, no need for CAPRICE at this time  4  Diabetic nephropathy  5  History of but he related renal transplant on chronic immunosuppression, now end-stage renal  6  Possible healthcare associated pneumonia               appreciate Pulmonary input, unlikely the patient has a pneumonia at this time  7  Acute respiratory failure               significantly improved status post ultrafiltration and hemodialysis yesterday as well as this morning  Patient continue to required nasal cannula earlier this morning, will look to have this removed altogether upon discharge home        Follow up reason for today's visit:  End-stage renal disease/secondary hyperparathyroidism/anemia chronic kidney disease    Acute respiratory failure [facility])    Patient Active Problem List   Diagnosis    Osteomyelitis ([facility] )    Foot pain    [facility]    Renal transplant, status post    Hyperkalemia    Sepsis ([facility]    Acute kidney injury ([facility] )    Osteomyelitis ([place] [address] )    Poor circulation    Closed fracture of distal end of right tibia with routine healing    Chronic kidney disease, stage IV (severe) ([facility])    Chronic osteomyelitis of tibia ([facility])    Immunosuppression ([facility] )    Hypertension    DKA (diabetic ketoacidoses) ([facility])    Elevated troponin    Diarrhea    Cellulitis of ankle    Non-ST elevation myocardial infarction (NSTEMI), type 2    Urinary retention    Severe sepsis ([facility])    Acute renal failure (ARF) ([facility])    Acute kidney injury superimposed on CKD ([facility])    Metabolic acidosis    Hyperphosphatemia    Hyponatremia    Gastrointestinal hemorrhage    Bacteremia    S/P AKA (above knee amputation), right ([facility])    Acute blood loss anemia    Acute respiratory failure with hypoxia ([facility])    Pulmonary hypertension ([facility])    Chronic anemia    Depressed left ventricular ejection fraction    Acute pulmonary edema ([facility])    Hx of right BKA ([facility])    Hypertensive urgency    ESRD (end stage renal disease) on dialysis ([place] [address] )    Coronary artery disease involving native coronary artery of native heart without angina pectoris    Pre-operative cardiovascular examination    Chronic kidney disease, unspecified    Lesion of pancreas    Abnormal CT scan, colon    Respiratory distress    Community acquired pneumonia of right lower lobe of lung    HCAP (healthcare-associated pneumonia)    Acute on chronic respiratory failure with hypoxia ([facility])    Acute respiratory failure ([facility])    Dyspnea and respiratory abnormality    Pulmonary edema         Subjective:   Patient has significantly improved over last 24 hours  He has successful hemodialysis session yesterday, he is currently on his 2nd hemodialysis session in a row  Objective:     Vitals: Blood pressure 168/74, pulse 83, temperature 97 8 °F (36 6 °C), temperature source Temporal, resp   rate (!) 25, height 5' 4" (1 626 m), weight 41 6 kg (91 lb 11 4 oz), SpO2 95 %  ,Body mass index is 15 74 kg/m²  Weight (last 2 days)     Date/Time   Weight    02/09/21 0600   41 6 (91 71)    02/08/21 1010   48 9 (107 81)    02/08/21 1008   48 9 (107 81)    02/08/21 0551   54 (119 05)                Intake/Output Summary (Last 24 hours) at 2/9/2021 1403  Last data filed at 2/9/2021 1305  Gross per 24 hour   Intake 1390 ml   Output 6000 ml   Net -4610 ml     I/O last 3 completed shifts: In: 0 [P O :150; I V :500; IV Piggyback:50]  Out: 3500 [Other:3500]         Physical Exam: /74   Pulse 83   Temp 97 8 °F (36 6 °C) (Temporal)   Resp (!) 25   Ht 5' 4" (1 626 m)   Wt 41 6 kg (91 lb 11 4 oz)   SpO2 95%   BMI 15 74 kg/m²     General Appearance:    Alert, cooperative, no distress, appears stated age   Head:    Normocephalic, without obvious abnormality, atraumatic   Eyes:    Conjunctiva/corneas clear   Ears:    Normal external ears   Nose:   Nares normal, septum midline, mucosa normal, no drainage    or sinus tenderness   Throat:   Lips, mucosa, and tongue normal; teeth and gums normal   Neck:   Supple   Back:     Symmetric, no curvature, ROM normal, no CVA tenderness   Lungs:     Clear to auscultation bilaterally, respirations unlabored   Chest wall:    No tenderness or deformity   Heart:    Regular rate and rhythm, S1 and S2 normal, no murmur, rub   or gallop   Abdomen:     Soft, non-tender, bowel sounds active   Extremities:   Extremities normal, atraumatic, no cyanosis or edema   Skin:   Skin color, texture, turgor normal, no rashes or lesions   Lymph nodes:   Cervical normal   Neurologic:   CNII-XII intact            Lab, Imaging and other studies: I have personally reviewed pertinent labs    CBC:   Lab Results   Component Value Date    WBC 11 43 (H) 02/09/2021    HGB 12 3 02/09/2021    HCT 38 5 02/09/2021     (H) 02/09/2021     02/09/2021    MCH 32 0 02/09/2021    MCHC 31 9 02/09/2021    RDW 14 4 02/09/2021 MPV 8 4 (L) 02/09/2021    NRBC 0 02/09/2021     CMP:   Lab Results   Component Value Date    K 4 3 02/09/2021     02/09/2021    CO2 24 02/09/2021    BUN 30 (H) 02/09/2021    CREATININE 4 51 (H) 02/09/2021    CALCIUM 9 6 02/09/2021    AST 21 02/09/2021    ALT 23 02/09/2021    ALKPHOS 100 02/09/2021    EGFR 14 02/09/2021         Results from last 7 days   Lab Units 02/09/21  0531 02/08/21  0554   POTASSIUM mmol/L 4 3 5 4*   CHLORIDE mmol/L 101 102   CO2 mmol/L 24 21   BUN mg/dL 30* 71*   CREATININE mg/dL 4 51* 7 65*   CALCIUM mg/dL 9 6 9 7   ALK PHOS U/L 100 140*   ALT U/L 23 25   AST U/L 21 12         Phosphorus:   Lab Results   Component Value Date    PHOS 4 4 02/09/2021     Magnesium:   Lab Results   Component Value Date    MG 2 5 02/09/2021     Urinalysis: No results found for: Graydon Mahesh, SPECGRAV, PHUR, LEUKOCYTESUR, NITRITE, PROTEINUA, GLUCOSEU, KETONESU, BILIRUBINUR, BLOODU  Ionized Calcium: No results found for: CAION  Coagulation: No results found for: PT, INR, APTT  Troponin:   Lab Results   Component Value Date    TROPONINI 0 09 (H) 02/09/2021     ABG: No results found for: PHART, HGI7ODD, PO2ART, GAJ1IFR, H6UJYIFE, BEART, SOURCE  Radiology review:     IMAGING  Procedure: X-ray Chest 1 View Portable    Result Date: 2/8/2021  Narrative: CHEST INDICATION:   chest pain  COMPARISON:  Chest radiograph from 10/18/2020 and abdomen CT from 6/9/2020  EXAM PERFORMED/VIEWS:  XR CHEST PORTABLE FINDINGS: Cardiomediastinal silhouette appears unremarkable  Pulmonary edema with severe pneumonia in the right midlung and right base  No effusion or pneumothorax  Osseous structures appear within normal limits for patient age  Impression: Pulmonary edema with severe right pneumonia   Workstation performed: [id number]     Procedure: Ct Chest Wo Contrast    Result Date: 2/8/2021  Narrative: CT CHEST WITHOUT IV CONTRAST INDICATION:   "Pleural effusion, respiratory failure  "  Increasing shortness of breath, dyspnea on exertion which began earlier this morning waking the patient  No cough or fever  History of coronary artery disease and chronic kidney disease on dialysis  COMPARISON:  Chest radiograph from today and 10/18/2020  Chest CT from 12/21/2018  TECHNIQUE: CT examination of the chest was performed without intravenous contrast   Axial, sagittal, and coronal 2D reformatted images were created from the source data and submitted for interpretation  Radiation dose length product (DLP) for this visit:  218 84 mGy-cm   This examination, like all CT scans performed in the [facility], was performed utilizing techniques to minimize radiation dose exposure, including the use of iterative  reconstruction and automated exposure control  FINDINGS: LUNGS:  Severe consolidation with air bronchograms throughout the right lung with moderate consolidation in the left lower lobe  No significant filling defects in the trachea and bronchi  PLEURA:  Trace effusions  HEART/GREAT VESSELS:  Mild cardiomegaly  Moderate coronary artery calcification indicating atherosclerotic heart disease  MEDIASTINUM AND DONNY:  Unremarkable  CHEST WALL AND LOWER NECK:   Unremarkable  UPPER ABDOMEN:  Trace perihepatic ascites  Severe renal atrophy  OSSEOUS STRUCTURES:  Diffuse sclerosis  Impression: Severe bilateral consolidation, greater on the right, with trace effusions  This is likely due to asymmetric pulmonary edema, given the history of dialysis and no signs of infection  Trace perihepatic ascites  Diffuse bone sclerosis related to renal osteodystrophy   Workstation performed: [id number]       Current Facility-Administered Medications   Medication Dose Route Frequency    acetaminophen (TYLENOL) tablet 650 mg  650 mg Oral Q6H PRN    albuterol inhalation solution 2 5 mg  2 5 mg Nebulization Q6H PRN    aspirin chewable tablet 81 mg  81 mg Oral QAM    atorvastatin (LIPITOR) tablet 80 mg  80 mg Oral QPM    azaTHIOprine (IMURAN) tablet 50 mg  50 mg Oral QAM    carvedilol (COREG) tablet 6 25 mg  6 25 mg Oral BID With Meals    glucose chewable tablet 16 g  16 g Oral Once    heparin (porcine) subcutaneous injection 5,000 Units  5,000 Units Subcutaneous Q8H [facility]    hydrALAZINE (APRESOLINE) tablet 50 mg  50 mg Oral Q8H [facility]    HYDROmorphone (DILAUDID) injection 0 2 mg  0 2 mg Intravenous Q4H PRN    Or    HYDROmorphone (DILAUDID) injection 0 5 mg  0 5 mg Intravenous Q4H PRN    insulin detemir (LEVEMIR) subcutaneous injection 4 Units  4 Units Subcutaneous Q12H [facility]    insulin lispro (HumaLOG) 100 units/mL subcutaneous injection 1-5 Units  1-5 Units Subcutaneous TID AC    insulin lispro (HumaLOG) 100 units/mL subcutaneous injection 1-5 Units  1-5 Units Subcutaneous HS    isosorbide mononitrate (IMDUR) 24 hr tablet 30 mg  30 mg Oral Daily    Labetalol HCl (NORMODYNE) injection 10 mg  10 mg Intravenous Q4H PRN    metoclopramide (REGLAN) injection 5 mg  5 mg Intravenous 4x Daily (AC & HS)    NIFEdipine (PROCARDIA XL) 24 hr tablet 90 mg  90 mg Oral Daily    ondansetron (ZOFRAN) injection 4 mg  4 mg Intravenous Q4H PRN    pantoprazole (PROTONIX) EC tablet 40 mg  40 mg Oral Daily Before Breakfast    predniSONE tablet 5 mg  5 mg Oral Daily    sodium chloride (PF) 0 9 % injection 3 mL  3 mL Intravenous Q1H PRN    tacrolimus (PROGRAF) capsule 2 mg  2 mg Oral Daily    And    tacrolimus (PROGRAF) capsule 1 mg  1 mg Oral HS     Medications Discontinued During This Encounter   Medication Reason    tacrolimus (PROGRAF) capsule 1 mg     Sevelamer Carbonate 0 8 g PACK Therapy completed    cinacalcet (SENSIPAR) 30 mg tablet Therapy completed    metoprolol succinate (TOPROL-XL) 24 hr tablet 25 mg     cefTRIAXone (ROCEPHIN) IVPB (premix in dextrose) 1,000 mg 50 mL     azithromycin (ZITHROMAX) 500 mg in sodium chloride 0 9 % 250 mL IVPB     insulin lispro (HumaLOG) 100 units/mL subcutaneous injection 1-5 Units     vancomycin (VANCOCIN) 500 mg in sodium chloride 0 9% 100 mL IVPB     cefepime (MAXIPIME) IVPB (premix in dextrose) 1,000 mg 50 mL     vancomycin (VANCOCIN) 500 mg in sodium chloride 0 9% 100 mL IVPB     vancomycin (VANCOCIN) 500 mg in sodium chloride 0 9 % 100 mL IVPB     insulin detemir (LEVEMIR) subcutaneous injection 8 Units     insulin detemir (LEVEMIR) subcutaneous injection 4 Units        Manju Fuller, DO      This progress note was produced in part using a dictation device which may document imprecise wording from author's original intent

## 2021-02-09 NOTE — RESPIRATORY THERAPY NOTE
02/08/21 5264   Non-Invasive Information   O2 Interface Device HFNC prongs   Non-Invasive Ventilation Mode HFNC (High flow)   SpO2 96 %   $ Pulse Oximetry Spot Check Charge Completed   Resp Comments water changed   Non-Invasive Settings   FiO2 (%) 70   Flow (lpm) 40   Humidification 33       Patient received on 80%, oxygen saturation 99%, dropped oxygen down to 70%    Patient is sleeping and not in respiratory distress, respirations 24    Machines have been wiped down    I have been able to decrease patients vapotherm settings as per the pulse oximetry, see flow chart

## 2021-02-09 NOTE — UTILIZATION REVIEW
Notification of Inpatient Admission/Inpatient Authorization Request   This is a Notification of Inpatient Admission for [address]  Be advised that this patient was admitted to our facility under Inpatient Status  Contact Reji Aponte at [phone] for additional admission information  [facility]  DEDICATED [phone]  Patient Name:   Yefri Kaiser   YOB: 1970       [address]   [address]:   [address]  Tax ID: [id number]  NPI: [id number] Attending Provider/NPI:  Phone:  Address: Dayron Jaimes Do [[id number]]  [phone]  Same as BILLIE/Dino Rolle [address] of Service Code: 24 Place of Service Name:  [address] [place] [address]   Start Date: 2/8/21 9596 Discharge Date & Time: No discharge date for patient encounter  Type of Admission: Inpatient Status Discharge Disposition   (if discharged): Home/Self Care   Patient Diagnoses: SOB (shortness of breath) [R06 02]  Dyspnea and respiratory abnormality [R06 00, R06 89]  ESRD (end stage renal disease) on dialysis ([address] ) [N18 6, Z99 2]  Acute respiratory failure, unspecified whether with hypoxia or hypercapnia ([address] ) [J96 00]     Orders: Admission Orders (From admission, onward)     Ordered        02/08/21 0717  Inpatient Admission  Once                    Assigned Utilization Review Contact: Reji Aponte  Utilization   Network Utilization Review Department  Phone: [phone]; Fax [phone]  Email: [email or web address]   ATTENTION PAYERS: Please call the assigned Utilization  directly with any questions or concerns ALL voicemails in the department are confidential  Send all requests for admission clinical reviews, approved or denied determinations and any other requests to dedicated fax number belonging to the campus where the patient is receiving treatment

## 2021-02-09 NOTE — ASSESSMENT & PLAN NOTE
BP severely elevated on arrival with systolic 178A, trending down gradually  · Continue Home medication Hydralazine 50 mg PO Q8H  · Labetalol IV 10 mg Q4H PRN for SBP >170 or DBP >100  · BP monitoring

## 2021-02-10 ENCOUNTER — APPOINTMENT (INPATIENT)
Dept: DIALYSIS | Facility: HOSPITAL | Age: 51
DRG: 291 | End: 2021-02-10
Attending: INTERNAL MEDICINE
Payer: COMMERCIAL

## 2021-02-10 VITALS
RESPIRATION RATE: 18 BRPM | WEIGHT: 88.85 LBS | TEMPERATURE: 97 F | OXYGEN SATURATION: 98 % | DIASTOLIC BLOOD PRESSURE: 57 MMHG | SYSTOLIC BLOOD PRESSURE: 121 MMHG | HEART RATE: 77 BPM | BODY MASS INDEX: 15.17 KG/M2 | HEIGHT: 64 IN

## 2021-02-10 PROBLEM — E80.6 HYPERBILIRUBINEMIA: Status: ACTIVE | Noted: 2021-02-10

## 2021-02-10 PROBLEM — I34.0 MITRAL VALVE INSUFFICIENCY: Status: ACTIVE | Noted: 2021-02-10

## 2021-02-10 PROBLEM — I50.33 ACUTE ON CHRONIC DIASTOLIC CHF (CONGESTIVE HEART FAILURE) (HCC): Status: ACTIVE | Noted: 2021-02-10

## 2021-02-10 PROBLEM — I50.33 ACUTE ON CHRONIC DIASTOLIC HEART FAILURE (HCC): Status: ACTIVE | Noted: 2021-02-10

## 2021-02-10 PROBLEM — R71.8 ELEVATED MCV: Status: ACTIVE | Noted: 2021-02-10

## 2021-02-10 PROBLEM — R79.89 ELEVATED PROCALCITONIN: Status: ACTIVE | Noted: 2021-02-10

## 2021-02-10 PROBLEM — R94.31 ABNORMAL EKG: Status: ACTIVE | Noted: 2021-02-10

## 2021-02-10 LAB
ALBUMIN SERPL BCP-MCNC: 3.3 G/DL (ref 3.5–5)
ALP SERPL-CCNC: 102 U/L (ref 46–116)
ALT SERPL W P-5'-P-CCNC: 17 U/L (ref 12–78)
ANION GAP SERPL CALCULATED.3IONS-SCNC: 12 MMOL/L (ref 4–13)
AST SERPL W P-5'-P-CCNC: 13 U/L (ref 5–45)
BASOPHILS # BLD AUTO: 0.06 THOUSANDS/ΜL (ref 0–0.1)
BASOPHILS NFR BLD AUTO: 1 % (ref 0–1)
BILIRUB SERPL-MCNC: 1.2 MG/DL (ref 0.2–1)
BUN SERPL-MCNC: 49 MG/DL (ref 5–25)
CALCIUM ALBUM COR SERPL-MCNC: 10.6 MG/DL (ref 8.3–10.1)
CALCIUM SERPL-MCNC: 10 MG/DL (ref 8.3–10.1)
CHLORIDE SERPL-SCNC: 100 MMOL/L (ref 100–108)
CO2 SERPL-SCNC: 25 MMOL/L (ref 21–32)
CREAT SERPL-MCNC: 4.98 MG/DL (ref 0.6–1.3)
EOSINOPHIL # BLD AUTO: 0.39 THOUSAND/ΜL (ref 0–0.61)
EOSINOPHIL NFR BLD AUTO: 4 % (ref 0–6)
ERYTHROCYTE [DISTWIDTH] IN BLOOD BY AUTOMATED COUNT: 14.1 % (ref 11.6–15.1)
GFR SERPL CREATININE-BSD FRML MDRD: 12 ML/MIN/1.73SQ M
GLUCOSE SERPL-MCNC: 189 MG/DL (ref 65–140)
GLUCOSE SERPL-MCNC: 397 MG/DL (ref 65–140)
GLUCOSE SERPL-MCNC: 44 MG/DL (ref 65–140)
HCT VFR BLD AUTO: 39.7 % (ref 36.5–49.3)
HGB BLD-MCNC: 12.5 G/DL (ref 12–17)
IMM GRANULOCYTES # BLD AUTO: 0.03 THOUSAND/UL (ref 0–0.2)
IMM GRANULOCYTES NFR BLD AUTO: 0 % (ref 0–2)
LYMPHOCYTES # BLD AUTO: 2.44 THOUSANDS/ΜL (ref 0.6–4.47)
LYMPHOCYTES NFR BLD AUTO: 26 % (ref 14–44)
M PNEUMO IGG SER IA-ACNC: 336 U/ML (ref 0–99)
M PNEUMO IGM SER IA-ACNC: <770 U/ML (ref 0–769)
MAGNESIUM SERPL-MCNC: 2.6 MG/DL (ref 1.6–2.6)
MCH RBC QN AUTO: 31.3 PG (ref 26.8–34.3)
MCHC RBC AUTO-ENTMCNC: 31.5 G/DL (ref 31.4–37.4)
MCV RBC AUTO: 99 FL (ref 82–98)
MONOCYTES # BLD AUTO: 1.16 THOUSAND/ΜL (ref 0.17–1.22)
MONOCYTES NFR BLD AUTO: 12 % (ref 4–12)
NEUTROPHILS # BLD AUTO: 5.4 THOUSANDS/ΜL (ref 1.85–7.62)
NEUTS SEG NFR BLD AUTO: 57 % (ref 43–75)
NRBC BLD AUTO-RTO: 0 /100 WBCS
PHOSPHATE SERPL-MCNC: 4.6 MG/DL (ref 2.7–4.5)
PLATELET # BLD AUTO: 252 THOUSANDS/UL (ref 149–390)
PMV BLD AUTO: 8.3 FL (ref 8.9–12.7)
POTASSIUM SERPL-SCNC: 4.3 MMOL/L (ref 3.5–5.3)
PROCALCITONIN SERPL-MCNC: 4.6 NG/ML
PROT SERPL-MCNC: 7.8 G/DL (ref 6.4–8.2)
RBC # BLD AUTO: 4 MILLION/UL (ref 3.88–5.62)
SODIUM SERPL-SCNC: 137 MMOL/L (ref 136–145)
TACROLIMUS BLD-MCNC: 3.9 NG/ML (ref 2–20)
WBC # BLD AUTO: 9.48 THOUSAND/UL (ref 4.31–10.16)

## 2021-02-10 PROCEDURE — 82948 REAGENT STRIP/BLOOD GLUCOSE: CPT

## 2021-02-10 PROCEDURE — 85025 COMPLETE CBC W/AUTO DIFF WBC: CPT | Performed by: STUDENT IN AN ORGANIZED HEALTH CARE EDUCATION/TRAINING PROGRAM

## 2021-02-10 PROCEDURE — 94760 N-INVAS EAR/PLS OXIMETRY 1: CPT

## 2021-02-10 PROCEDURE — 84145 PROCALCITONIN (PCT): CPT | Performed by: INTERNAL MEDICINE

## 2021-02-10 PROCEDURE — 84100 ASSAY OF PHOSPHORUS: CPT | Performed by: STUDENT IN AN ORGANIZED HEALTH CARE EDUCATION/TRAINING PROGRAM

## 2021-02-10 PROCEDURE — 80053 COMPREHEN METABOLIC PANEL: CPT | Performed by: STUDENT IN AN ORGANIZED HEALTH CARE EDUCATION/TRAINING PROGRAM

## 2021-02-10 PROCEDURE — 83735 ASSAY OF MAGNESIUM: CPT | Performed by: STUDENT IN AN ORGANIZED HEALTH CARE EDUCATION/TRAINING PROGRAM

## 2021-02-10 PROCEDURE — 99232 SBSQ HOSP IP/OBS MODERATE 35: CPT | Performed by: INTERNAL MEDICINE

## 2021-02-10 PROCEDURE — 94664 DEMO&/EVAL PT USE INHALER: CPT

## 2021-02-10 PROCEDURE — 99239 HOSP IP/OBS DSCHRG MGMT >30: CPT | Performed by: INTERNAL MEDICINE

## 2021-02-10 RX ORDER — TACROLIMUS 1 MG/1
2 CAPSULE ORAL DAILY
Refills: 0
Start: 2021-02-11 | End: 2021-05-28

## 2021-02-10 RX ORDER — TACROLIMUS 1 MG/1
1 CAPSULE ORAL
Refills: 0
Start: 2021-02-10 | End: 2021-05-07 | Stop reason: SDUPTHER

## 2021-02-10 RX ADMIN — NIFEDIPINE 90 MG: 30 TABLET, FILM COATED, EXTENDED RELEASE ORAL at 08:25

## 2021-02-10 RX ADMIN — PREDNISONE 5 MG: 5 TABLET ORAL at 08:25

## 2021-02-10 RX ADMIN — HYDRALAZINE HYDROCHLORIDE 50 MG: 25 TABLET, FILM COATED ORAL at 05:42

## 2021-02-10 RX ADMIN — ASPIRIN 81 MG CHEWABLE TABLET 81 MG: 81 TABLET CHEWABLE at 08:25

## 2021-02-10 RX ADMIN — INSULIN LISPRO 4 UNITS: 100 INJECTION, SOLUTION INTRAVENOUS; SUBCUTANEOUS at 12:10

## 2021-02-10 RX ADMIN — CARVEDILOL 6.25 MG: 3.12 TABLET, FILM COATED ORAL at 08:25

## 2021-02-10 RX ADMIN — PANTOPRAZOLE SODIUM 40 MG: 40 TABLET, DELAYED RELEASE ORAL at 06:00

## 2021-02-10 RX ADMIN — INSULIN LISPRO 1 UNITS: 100 INJECTION, SOLUTION INTRAVENOUS; SUBCUTANEOUS at 08:26

## 2021-02-10 RX ADMIN — TACROLIMUS 2 MG: 1 CAPSULE ORAL at 08:46

## 2021-02-10 RX ADMIN — INSULIN DETEMIR 4 UNITS: 100 INJECTION, SOLUTION SUBCUTANEOUS at 08:46

## 2021-02-10 RX ADMIN — ISOSORBIDE MONONITRATE 30 MG: 30 TABLET, EXTENDED RELEASE ORAL at 08:25

## 2021-02-10 RX ADMIN — AZATHIOPRINE 50 MG: 50 TABLET ORAL at 08:25

## 2021-02-10 NOTE — ASSESSMENT & PLAN NOTE
· Resolved with hemodialysis treatment  · Follow the potassium level as an outpatient with Nephrology    Results from last 7 days   Lab Units 02/10/21  0539 02/09/21  0531 02/08/21  0554   SODIUM mmol/L 137 137 139   POTASSIUM mmol/L 4 3 4 3 5 4*   CHLORIDE mmol/L 100 101 102   CO2 mmol/L 25 24 21   BUN mg/dL 49* 30* 71*   CREATININE mg/dL 4 98* 4 51* 7 65*   CALCIUM mg/dL 10 0 9 6 9 7

## 2021-02-10 NOTE — ASSESSMENT & PLAN NOTE
· Initially treated with IV vancomycin and IV cefepime  · The patient was seen in consultation by Pulmonology/Critical Care Medicine, who did not feel that he had an acute pulmonary infection at this time  · The abnormal findings on the patient's chest x-ray almost completely resolved on 02/09/2021, which was likely secondary to pulmonary edema improving after hemodialysis treatment  · The antibiotics were discontinued, and the patient will continue to be observed off antibiotic treatment

## 2021-02-10 NOTE — ASSESSMENT & PLAN NOTE
· Secondary to acute on chronic diastolic congestive heart failure requiring emergent hemodialysis  · Initially required the Vapotherm Unit to maintain oxygen saturation levels at 92% and above  · Successfully weaned off all supplemental oxygen by the time of discharge

## 2021-02-10 NOTE — ASSESSMENT & PLAN NOTE
· History of failed renal transplant  · Continue PO azathioprine, PO tacrolimus, and PO prednisone  · Outpatient follow-up with the Renal Transplant Team    Results for Palma Officer (MRN [id number]) as of 2/10/2021 16:30   Ref   Range 2/9/2021 05:31   TACROLIMUS Latest Ref Range: 2 0 - 20 0 ng/mL 3 9

## 2021-02-10 NOTE — ASSESSMENT & PLAN NOTE
Wt Readings from Last 3 Encounters:   02/10/21 40 3 kg (88 lb 13 5 oz)   11/22/20 48 8 kg (107 lb 9 4 oz)   10/18/20 49 6 kg (109 lb 5 6 oz)     · Requiring emergent hemodialysis  · The patient was seen in consultation by Nephrology  · The patient underwent hemodialysis on 3 consecutive days including 02/08/2021, 02/09/2021, and 02/10/2021 during the hospitalization  · Next outpatient hemodialysis session will be on Friday, 02/12/2021  · Nephrology may decrease the patient's dry weight goal in an attempt to prevent volume overload when he is off hemodialysis for 2 consecutive days during each weekend  · He may also require PO diuretic treatment, which I will defer to Nephrology    · Continue PO coreg  · Discontinue PO toprol XL with the patient already being on PO coreg  · Outpatient Cardiology evaluation

## 2021-02-10 NOTE — DISCHARGE SUMMARY
Discharge- Jose Daniel Flowers 1970, 45 y o  male MRN: [id number]    Unit/Bed#:  Encounter: [id number]    Primary Care Provider: Ronni Marinelli DO   Date and time admitted to hospital: 2/8/2021  5:53 AM        * Acute on chronic diastolic CHF (congestive heart failure) ([facility] [address] )  Assessment & Plan  Wt Readings from Last 3 Encounters:   02/10/21 40 3 kg (88 lb 13 5 oz)   11/22/20 48 8 kg (107 lb 9 4 oz)   10/18/20 49 6 kg (109 lb 5 6 oz)     · Requiring emergent hemodialysis  · The patient was seen in consultation by Nephrology  · The patient underwent hemodialysis on 3 consecutive days including 02/08/2021, 02/09/2021, and 02/10/2021 during the hospitalization  · Next outpatient hemodialysis session will be on Friday, 02/12/2021  · Nephrology may decrease the patient's dry weight goal in an attempt to prevent volume overload when he is off hemodialysis for 2 consecutive days during each weekend  · He may also require PO diuretic treatment, which I will defer to Nephrology    · Continue PO coreg  · Discontinue PO toprol XL with the patient already being on PO coreg  · Outpatient Cardiology evaluation        Acute respiratory failure with hypoxia [facility])  Assessment & Plan  · Secondary to acute on chronic diastolic congestive heart failure requiring emergent hemodialysis  · Initially required the Vapotherm Unit to maintain oxygen saturation levels at 92% and above  · Successfully weaned off all supplemental oxygen by the time of discharge    Abnormal EKG  Assessment & Plan  · Outpatient Cardiology evaluation    ECG 12 lead  Order: [id number]  Status:  Final result   Visible to patient:  Yes ([facility] MyChart)   Next appt:  02/18/2021 at 03:50 PM in Family Medicine (COVID VACCINE RESOURCE)   Ref Range & Units 2/8/21 0637   Ventricular Rate BPM 72    Atrial Rate BPM 72    AK Interval ms 124    QRSD Interval ms 92    QT Interval ms 418    QTC Interval ms 457    P Axis degrees 49    QRS Axis degrees 20    T Wave Axis degrees 85       Narrative & Impression    Normal sinus rhythm  Possible Left atrial enlargement  Left ventricular hypertrophy  Nonspecific T wave abnormality  Abnormal ECG  When compared with ECG of 18-OCT-2020 00:33,  Nonspecific T wave abnormality now evident in Lateral leads  Confirmed by Phylicia Charles ([id number]) on 2/8/2021 12:42:59 PM      Specimen Collected: 02/08/21 06:37   Last Resulted: 02/08/21 12:43               Mitral valve insufficiency  Assessment & Plan  · Outpatient Cardiology evaluation    Elevated procalcitonin  Assessment & Plan  · Initially treated with IV vancomycin and IV cefepime  · The patient was seen in consultation by Pulmonology/Critical Care Medicine, who did not feel that he had an acute pulmonary infection at this time  · The abnormal findings on the patient's chest x-ray almost completely resolved on 02/09/2021, which was likely secondary to pulmonary edema improving after hemodialysis treatment  · The antibiotics were discontinued, and the patient will continue to be observed off antibiotic treatment  Hyperbilirubinemia  Assessment & Plan  · Mild hyperbilirubinemia  · Follow the total bilirubin level as an outpatient with his PCP    Elevated MCV  Assessment & Plan  · Check a vitamin B12 level and folate level as an outpatient with his PCP    ESRD (end stage renal disease) on dialysis [facility])  Assessment & Plan  Lab Results   Component Value Date    EGFR 12 02/10/2021    EGFR 14 02/09/2021    EGFR 7 02/08/2021    CREATININE 4 98 (H) 02/10/2021    CREATININE 4 51 (H) 02/09/2021    CREATININE 7 65 (H) 02/08/2021     · The patient was seen in consultation by Nephrology  · The patient underwent hemodialysis on 3 consecutive days including 02/08/2021, 02/09/2021, and 02/10/2021 during the hospitalization  · Next outpatient hemodialysis session will be on Friday, 02/12/2021    · Nephrology may decrease the patient's dry weight goal in an attempt to prevent volume overload when he is off hemodialysis for 2 consecutive days during each weekend  · He may also require PO diuretic treatment, which I will defer to Nephrology  Hyperphosphatemia  Assessment & Plan  · Management per Nephrology  · Follow the phosphorus level as an outpatient with Nephrology    Hypertension  Assessment & Plan  · Discontinue toprol XL with the patient also being on coreg  · Continue PO procardia XL and PO hydralazine  · Outpatient follow-up with Nephrology in regards to this matter    Hyperkalemia  Assessment & Plan  · Resolved with hemodialysis treatment  · Follow the potassium level as an outpatient with Nephrology    Results from last 7 days   Lab Units 02/10/21  0539 02/09/21  0531 02/08/21  0554   SODIUM mmol/L 137 137 139   POTASSIUM mmol/L 4 3 4 3 5 4*   CHLORIDE mmol/L 100 101 102   CO2 mmol/L 25 24 21   BUN mg/dL 49* 30* 71*   CREATININE mg/dL 4 98* 4 51* 7 65*   CALCIUM mg/dL 10 0 9 6 9 7         Renal transplant, status post  Assessment & Plan  · History of failed renal transplant  · Continue PO azathioprine, PO tacrolimus, and PO prednisone  · Outpatient follow-up with the Renal Transplant Team    Results for Ruddy Mckeon (MRN [id number]) as of 2/10/2021 16:30   Ref   Range 2/9/2021 05:31   TACROLIMUS Latest Ref Range: 2 0 - 20 0 ng/mL 3 9       Type 1 diabetes mellitus [facility])  Assessment & Plan  Lab Results   Component Value Date    HGBA1C 6 3 (H) 02/09/2021       Recent Labs     02/09/21  1607 02/09/21  2118 02/10/21  0656 02/10/21  1118   POCGLU 434* 462* 189* 397*       Blood Sugar Average: Last 72 hrs:  (P) 248 9     · Continue the patient's home insulin regimen upon discharge  · Outpatient follow-up with Endocrinology      Discharging Physician / Practitioner: Desmond Barry DO  PCP: Pam Silva DO  Admission Date:   Admission Orders (From admission, onward)     Ordered        02/08/21 0717  Inpatient Admission  Once                   Discharge Date: 02/10/21    Consultations During Hospital Stay:  · Pulmonology/Critical Care Medicine  · Nephrology    Procedures Performed:   · Hemodialysis on 02/08/2021, 02/09/2021, and 02/10/2021 during the hospitalization    Significant Findings / Test Results:   Xr Chest Portable    Result Date: 2/9/2021  Impression: Near-complete resolution of pulmonary edema Workstation performed: [id number]     X-ray Chest 1 View Portable    Result Date: 2/8/2021  Impression: Pulmonary edema with severe right pneumonia  Workstation performed: [id number]     Ct Chest Wo Contrast    Result Date: 2/8/2021  Impression: Severe bilateral consolidation, greater on the right, with trace effusions  This is likely due to asymmetric pulmonary edema, given the history of dialysis and no signs of infection  Trace perihepatic ascites  Diffuse bone sclerosis related to renal osteodystrophy   Workstation performed: [id number]     ECG 12 lead  Order: [id number]  Status:  Final result   Visible to patient:  Yes ([facility] MyChart)   Next appt:  02/18/2021 at 03:50 PM in Family Medicine ([facility])   Ref Range & Units 2/8/21 0637   Ventricular Rate BPM 72    Atrial Rate BPM 72    CT Interval ms 124    QRSD Interval ms 92    QT Interval ms 418    QTC Interval ms 457    P Axis degrees 49    QRS Axis degrees 20    T Wave Axis degrees 85       Narrative & Impression    Normal sinus rhythm  Possible Left atrial enlargement  Left ventricular hypertrophy  Nonspecific T wave abnormality  Abnormal ECG  When compared with ECG of 18-OCT-2020 00:33,  Nonspecific T wave abnormality now evident in Lateral leads  Confirmed by Elías Orourke ([id number]) on 2/8/2021 12:42:59 PM      Specimen Collected: 02/08/21 06:37   Last Resulted: 02/08/21 12:43           Microbiology Results (last 21 days)    Collected Updated Procedure Result Status Patient Facility Result Comment    02/08/2021 0727 02/08/2021 0821 COVID19, Influenza A/B, RSV PCR, [facility] [[id number]]    Nares from Nasopharyngeal Swab    Final result [address]  This test has been authorized by FDA under an EUA (Emergency Use Assay) for use by authorized laboratories  Marcelino Yisel caution and judgement should be used with the interpretation of these results with consideration of the clinical impression and other laboratory testing   Testing reported as "Positive" or "Negative" has been proven to be accurate according to standard laboratory validation requirements   All testing is performed with control materials showing appropriate reactivity at standard intervals  Component Value   SARS-COV-2 Negative   INFLU A PCR Negative   INFLU B PCR Negative   RSV PCR Negative              02/08/2021 0604 02/10/2021 1201 Blood culture [[id number]]   Blood from Arm, Left    Preliminary result [address]  Component Value   Blood Culture No Growth at 48 hrs  P              02/08/2021 0604 02/10/2021 1201 Blood culture [[id number]]   Blood from Hand, Left    Preliminary result [address]  Component Value   Blood Culture No Growth at 48 hrs  P          Incidental Findings:   · None    Test Results Pending at Discharge (will require follow-up): · Blood cultures x 2 sets from 02/08/2021 (follow-up with PCP)     Outpatient Tests Requested:  · CBC with diff , CMP, Magnesium level, Phosphorus level, and Procalcitonin level in 2 days with hemodialysis treatment    Complications:  None    Reason for Admission:  Acute respiratory failure with hypoxia secondary to acute on chronic diastolic congestive heart failure    Hospital Course:     Saman Garay is a 45 y o  male patient who originally presented to the hospital on 2/8/2021 due to shortness of breath  Please see above list of diagnoses and related plan for additional information  Condition at Discharge: good     Discharge Day Visit / Exam:     Subjective: The patient was seen and examined  The patient is doing better  No chest pain    No shortness of breath  No abdominal pain  No nausea or vomiting  Vitals: Blood Pressure: 121/57 (02/10/21 1430)  Pulse: 77 (02/10/21 1430)  Temperature: (!) 97 °F (36 1 °C) (02/10/21 1430)  Temp Source: Temporal (02/10/21 1430)  Respirations: 18 (02/10/21 1430)  Height: 5' 4" (162 6 cm) (02/08/21 1008)  Weight - Scale: 40 3 kg (88 lb 13 5 oz) (02/10/21 0542)  SpO2: 98 % (02/10/21 1430)  Exam:   Physical Exam  General:  NAD, follows commands  HEENT:  NC/AT, mucous membranes moist  Neck:  Supple, No JVP elevation  CV:  + S1, + S2, RRR  Pulm:  Lung fields are CTA bilaterally  Abd:  Soft, Non-tender, Non-distended  Ext:  No clubbing/cyanosis/edema  Skin:  No rashes  Neuro:  Awake, alert, oriented  Psych:  Normal mood and affect    Discussion with Family: I updated the patient's wife, Isac Carpio, on the telephone  Discharge instructions/Information to patient and family:  See after visit summary for information provided to patient and family  Provisions for Follow-Up Care:  See after visit summary for information related to follow-up care and any pertinent home health orders  Disposition:     Home    Planned Readmission:  No     Discharge Statement:  I spent 45 minutes discharging the patient  This time was spent on the day of discharge  I had direct contact with the patient on the day of discharge  Greater than 50% of the total time was spent examining patient, answering all patient questions, arranging and discussing plan of care with patient as well as directly providing post-discharge instructions  Additional time then spent on discharge activities  Discharge Medications:  See after visit summary for reconciled discharge medications provided to patient and family        ** Please Note: This note has been constructed using a voice recognition system **

## 2021-02-10 NOTE — CASE MANAGEMENT
Discussed with patient preferences on discharge;understanding how to manage health at home; purpose of taking medications; importance of follow up care/appointments; and symptoms to watch out for once discharged home  Patient is being discharged today to home  In basket messages sent to Dr Elton Fuentes office and Dr Toshia Sigala office for follow up appointments  His wife will transport him home

## 2021-02-10 NOTE — ASSESSMENT & PLAN NOTE
Lab Results   Component Value Date    EGFR 12 02/10/2021    EGFR 14 02/09/2021    EGFR 7 02/08/2021    CREATININE 4 98 (H) 02/10/2021    CREATININE 4 51 (H) 02/09/2021    CREATININE 7 65 (H) 02/08/2021     · The patient was seen in consultation by Nephrology  · The patient underwent hemodialysis on 3 consecutive days including 02/08/2021, 02/09/2021, and 02/10/2021 during the hospitalization  · Next outpatient hemodialysis session will be on Friday, 02/12/2021  · Nephrology may decrease the patient's dry weight goal in an attempt to prevent volume overload when he is off hemodialysis for 2 consecutive days during each weekend  · He may also require PO diuretic treatment, which I will defer to Nephrology

## 2021-02-10 NOTE — PROGRESS NOTES
Progress Note - Nephrology   Taryn Sofia 45 y o  male MRN: [id number]  Unit/Bed#:  Encounter: [id number]    A/P:  1  End-stage renal disease                patient will have a 2 hour treatment for dialysis today, will ultrafilter 1 5 L or as tolerated  2  Secondary hyperparathyroidism             Will follow-up PTH in the outpatient setting  3  Anemia of chronic kidney disease             No CAPRICE indicated this time, will continue following the outpatient setting  4  Diabetic nephropathy  5  History of but he related renal transplant on chronic immunosuppression, now end-stage renal  6  Acute respiratory failure                significantly improved, continue with ultrafilter as tolerated hemodialysis sessions  Patient will need to have a new estimated dry weight        Follow up reason for today's visit:  End-stage renal disease/secondary hyperparathyroidism/anemia chronic kidney disease    Acute respiratory failure [facility])    Patient Active Problem List   Diagnosis    Osteomyelitis ([address]    Foot pain    Type 1 diabetes mellitus ([address] )    Renal transplant, status post    Hyperkalemia    Sepsis ([address] )    Acute kidney injury ([address] )    Osteomyelitis ([address]    Poor circulation    Closed fracture of distal end of right tibia with routine healing    Chronic kidney disease, stage IV (severe) ([address] )    Chronic osteomyelitis of tibia ([address] )    Immunosuppression ([address] )    Hypertension    DKA (diabetic ketoacidoses) ([facility])    Elevated troponin    Diarrhea    Cellulitis of ankle    Non-ST elevation myocardial infarction (NSTEMI), type 2    Urinary retention    Severe sepsis ([facility])    Acute renal failure (ARF) ([facility])    Acute kidney injury superimposed on CKD ([facility])    Metabolic acidosis    Hyperphosphatemia    Hyponatremia    Gastrointestinal hemorrhage    Bacteremia    S/P AKA (above knee amputation), right ([facility])    Acute blood loss anemia    Acute respiratory failure with hypoxia ([address] )    Pulmonary hypertension (HCC)    Chronic anemia    Depressed left ventricular ejection fraction    Acute pulmonary edema (HCC)    Hx of right BKA (HCC)    Hypertensive urgency    ESRD (end stage renal disease) on dialysis [facility]    Coronary artery disease involving native coronary artery of native heart without angina pectoris    Pre-operative cardiovascular examination    Chronic kidney disease, unspecified    Lesion of pancreas    Abnormal CT scan, colon    Respiratory distress    Community acquired pneumonia of right lower lobe of lung    HCAP (healthcare-associated pneumonia)    Acute on chronic respiratory failure with hypoxia (HCC)    Acute respiratory failure (HCC)    Dyspnea and respiratory abnormality    Pulmonary edema    Elevated MCV         Subjective:   No Acute events overnight, patient no longer requires oxygen kidneys cannula  Objective:     Vitals: Blood pressure 112/54, pulse 79, temperature 98 8 °F (37 1 °C), temperature source Temporal, resp  rate 18, height 5' 4" (1 626 m), weight 40 3 kg (88 lb 13 5 oz), SpO2 97 %  ,Body mass index is 15 25 kg/m²  Weight (last 2 days)     Date/Time   Weight    02/10/21 0542   40 3 (88 85)    02/09/21 0600   41 6 (91 71)    02/08/21 1010   48 9 (107 81)    02/08/21 1008   48 9 (107 81)    02/08/21 0551   54 (119 05)                Intake/Output Summary (Last 24 hours) at 2/10/2021 0821  Last data filed at 2/10/2021 0540  Gross per 24 hour   Intake 1200 ml   Output 2500 ml   Net -1300 ml     I/O last 3 completed shifts: In: 1598 [P O :1050;  I V :800]  Out: 6000 [Other:6000]         Physical Exam: /54 (BP Location: Left arm)   Pulse 79   Temp 98 8 °F (37 1 °C) (Temporal)   Resp 18   Ht 5' 4" (1 626 m)   Wt 40 3 kg (88 lb 13 5 oz)   SpO2 97%   BMI 15 25 kg/m²     General Appearance:    Alert, cooperative, no distress, appears stated age   Head:    Normocephalic, without obvious abnormality, atraumatic   Eyes: Conjunctiva/corneas clear   Ears:    Normal external ears   Nose:   Nares normal, septum midline, mucosa normal, no drainage    or sinus tenderness   Throat:   Lips, mucosa, and tongue normal; teeth and gums normal   Neck:   Supple, symmetrical, trachea midline, no adenopathy;        thyroid:  No enlargement/tenderness/nodules; no carotid    bruit or JVD   Back:     Symmetric, no curvature, ROM normal, no CVA tenderness   Lungs:     Reduced but otherwise clear bilaterally   Chest wall:    No tenderness or deformity   Heart:    Regular rate and rhythm, S1 and S2 normal, no murmur, rub   or gallop   Abdomen:     Soft, non-tender, bowel sounds active   Extremities:   Extremities normal, atraumatic, no cyanosis or edema   Skin:   Skin color, texture, turgor normal, no rashes or lesions   Lymph nodes:   Cervical normal   Neurologic:   CNII-XII intact            Lab, Imaging and other studies: I have personally reviewed pertinent labs  CBC:   Lab Results   Component Value Date    WBC 9 48 02/10/2021    HGB 12 5 02/10/2021    HCT 39 7 02/10/2021    MCV 99 (H) 02/10/2021     02/10/2021    MCH 31 3 02/10/2021    MCHC 31 5 02/10/2021    RDW 14 1 02/10/2021    MPV 8 3 (L) 02/10/2021    NRBC 0 02/10/2021     CMP:   Lab Results   Component Value Date    K 4 3 02/10/2021     02/10/2021    CO2 25 02/10/2021    BUN 49 (H) 02/10/2021    CREATININE 4 98 (H) 02/10/2021    CALCIUM 10 0 02/10/2021    AST 13 02/10/2021    ALT 17 02/10/2021    ALKPHOS 102 02/10/2021    EGFR 12 02/10/2021           Results from last 7 days   Lab Units 02/10/21  0539 02/09/21  0531 02/08/21  0554   POTASSIUM mmol/L 4 3 4 3 5 4*   CHLORIDE mmol/L 100 101 102   CO2 mmol/L 25 24 21   BUN mg/dL 49* 30* 71*   CREATININE mg/dL 4 98* 4 51* 7 65*   CALCIUM mg/dL 10 0 9 6 9 7   ALK PHOS U/L 102 100 140*   ALT U/L 17 23 25   AST U/L 13 21 12         Phosphorus:   Lab Results   Component Value Date    PHOS 4 6 (H) 02/10/2021     Magnesium:   Lab Results Component Value Date    MG 2 6 02/10/2021     Urinalysis: No results found for: Joneen Means, SPECGRAV, PHUR, LEUKOCYTESUR, NITRITE, PROTEINUA, GLUCOSEU, KETONESU, BILIRUBINUR, BLOODU  Ionized Calcium: No results found for: CAION  Coagulation: No results found for: PT, INR, APTT  Troponin: No results found for: TROPONINI  ABG: No results found for: PHART, ZEI5HPS, PO2ART, TCC3GXR, X5DKOMCJ, BEART, SOURCE  Radiology review:     IMAGING  Procedure: Xr Chest Portable    Result Date: 2/9/2021  Narrative: CHEST INDICATION:   pulmonary edema  COMPARISON:  Chest radiograph and CT from 2/8/2021  EXAM PERFORMED/VIEWS:  XR CHEST PORTABLE FINDINGS: Cardiomediastinal silhouette appears unremarkable  Near complete resolution of pulmonary edema with mild residual fluid in the right midlung  No effusion or pneumothorax  Old right rib fractures  Impression: Near-complete resolution of pulmonary edema Workstation performed: [id number]     Procedure: X-ray Chest 1 View Portable    Result Date: 2/8/2021  Narrative: CHEST INDICATION:   chest pain  COMPARISON:  Chest radiograph from 10/18/2020 and abdomen CT from 6/9/2020  EXAM PERFORMED/VIEWS:  XR CHEST PORTABLE FINDINGS: Cardiomediastinal silhouette appears unremarkable  Pulmonary edema with severe pneumonia in the right midlung and right base  No effusion or pneumothorax  Osseous structures appear within normal limits for patient age  Impression: Pulmonary edema with severe right pneumonia  Workstation performed: [id number]     Procedure: Ct Chest Wo Contrast    Result Date: 2/8/2021  Narrative: CT CHEST WITHOUT IV CONTRAST INDICATION:   "Pleural effusion, respiratory failure  "  Increasing shortness of breath, dyspnea on exertion which began earlier this morning waking the patient  No cough or fever  History of coronary artery disease and chronic kidney disease on dialysis  COMPARISON:  Chest radiograph from today and 10/18/2020  Chest CT from 12/21/2018   TECHNIQUE: CT examination of the chest was performed without intravenous contrast   Axial, sagittal, and coronal 2D reformatted images were created from the source data and submitted for interpretation  Radiation dose length product (DLP) for this visit:  218 84 mGy-cm   This examination, like all CT scans performed in the [facility], was performed utilizing techniques to minimize radiation dose exposure, including the use of iterative  reconstruction and automated exposure control  FINDINGS: LUNGS:  Severe consolidation with air bronchograms throughout the right lung with moderate consolidation in the left lower lobe  No significant filling defects in the trachea and bronchi  PLEURA:  Trace effusions  HEART/GREAT VESSELS:  Mild cardiomegaly  Moderate coronary artery calcification indicating atherosclerotic heart disease  MEDIASTINUM AND DONNY:  Unremarkable  CHEST WALL AND LOWER NECK:   Unremarkable  UPPER ABDOMEN:  Trace perihepatic ascites  Severe renal atrophy  OSSEOUS STRUCTURES:  Diffuse sclerosis  Impression: Severe bilateral consolidation, greater on the right, with trace effusions  This is likely due to asymmetric pulmonary edema, given the history of dialysis and no signs of infection  Trace perihepatic ascites  Diffuse bone sclerosis related to renal osteodystrophy   Workstation performed: [id number]       Current Facility-Administered Medications   Medication Dose Route Frequency    acetaminophen (TYLENOL) tablet 650 mg  650 mg Oral Q6H PRN    albuterol inhalation solution 2 5 mg  2 5 mg Nebulization Q6H PRN    aspirin chewable tablet 81 mg  81 mg Oral QAM    atorvastatin (LIPITOR) tablet 80 mg  80 mg Oral QPM    azaTHIOprine (IMURAN) tablet 50 mg  50 mg Oral QAM    carvedilol (COREG) tablet 6 25 mg  6 25 mg Oral BID With Meals    glucose chewable tablet 16 g  16 g Oral Once    heparin (porcine) subcutaneous injection 5,000 Units  5,000 Units Subcutaneous Q8H [facility]    hydrALAZINE (APRESOLINE) tablet 50 mg  50 mg Oral Q8H [address]    HYDROmorphone (DILAUDID) injection 0 2 mg  0 2 mg Intravenous Q4H PRN    Or    HYDROmorphone (DILAUDID) injection 0 5 mg  0 5 mg Intravenous Q4H PRN    insulin detemir (LEVEMIR) subcutaneous injection 4 Units  4 Units Subcutaneous Q12H [address]    insulin lispro (HumaLOG) 100 units/mL subcutaneous injection 1-5 Units  1-5 Units Subcutaneous TID AC    insulin lispro (HumaLOG) 100 units/mL subcutaneous injection 1-5 Units  1-5 Units Subcutaneous HS    isosorbide mononitrate (IMDUR) 24 hr tablet 30 mg  30 mg Oral Daily    Labetalol HCl (NORMODYNE) injection 10 mg  10 mg Intravenous Q4H PRN    metoclopramide (REGLAN) injection 5 mg  5 mg Intravenous 4x Daily (AC & HS)    NIFEdipine (PROCARDIA XL) 24 hr tablet 90 mg  90 mg Oral Daily    ondansetron (ZOFRAN) injection 4 mg  4 mg Intravenous Q4H PRN    pantoprazole (PROTONIX) EC tablet 40 mg  40 mg Oral Daily Before Breakfast    predniSONE tablet 5 mg  5 mg Oral Daily    sodium chloride (PF) 0 9 % injection 3 mL  3 mL Intravenous Q1H PRN    tacrolimus (PROGRAF) capsule 2 mg  2 mg Oral Daily    And    tacrolimus (PROGRAF) capsule 1 mg  1 mg Oral HS     Medications Discontinued During This Encounter   Medication Reason    tacrolimus (PROGRAF) capsule 1 mg     Sevelamer Carbonate 0 8 g PACK Therapy completed    cinacalcet (SENSIPAR) 30 mg tablet Therapy completed    metoprolol succinate (TOPROL-XL) 24 hr tablet 25 mg     cefTRIAXone (ROCEPHIN) IVPB (premix in dextrose) 1,000 mg 50 mL     azithromycin (ZITHROMAX) 500 mg in sodium chloride 0 9 % 250 mL IVPB     insulin lispro (HumaLOG) 100 units/mL subcutaneous injection 1-5 Units     vancomycin (VANCOCIN) 500 mg in sodium chloride 0 9% 100 mL IVPB     cefepime (MAXIPIME) IVPB (premix in dextrose) 1,000 mg 50 mL     vancomycin (VANCOCIN) 500 mg in sodium chloride 0 9% 100 mL IVPB     vancomycin (VANCOCIN) 500 mg in sodium chloride 0 9 % 100 mL IVPB     insulin detemir (LEVEMIR) subcutaneous injection 8 Units     insulin detemir (LEVEMIR) subcutaneous injection 4 Units        Larraine Grant, DO      This progress note was produced in part using a dictation device which may document imprecise wording from author's original intent

## 2021-02-10 NOTE — ASSESSMENT & PLAN NOTE
· Discontinue toprol XL with the patient also being on coreg  · Continue PO procardia XL and PO hydralazine  · Outpatient follow-up with Nephrology in regards to this matter

## 2021-02-10 NOTE — PLAN OF CARE
Pt for 2hr HD treatment today  Right upper fistula cannulated with #16g needles x2 without difficulty   d/t #16g needles used for treatment  UF 1 5kg as tolerated  Using 3K+ bath for treatment        Problem: METABOLIC, FLUID AND ELECTROLYTES - ADULT  Goal: Electrolytes maintained within normal limits  Description: INTERVENTIONS:  - Monitor labs and assess patient for signs and symptoms of electrolyte imbalances  - Administer electrolyte replacement as ordered  - Monitor response to electrolyte replacements, including repeat lab results as appropriate  - Instruct patient on fluid and nutrition as appropriate  - Dialysis as ordered  Outcome: Progressing  Goal: Fluid balance maintained  Description: INTERVENTIONS:  - Monitor labs   - Monitor I/O and WT  - Instruct patient on fluid and nutrition as appropriate  - Assess for signs & symptoms of volume excess or deficit  -Dialysis as ordered  Outcome: Progressing

## 2021-02-10 NOTE — ASSESSMENT & PLAN NOTE
Lab Results   Component Value Date    HGBA1C 6 3 (H) 02/09/2021       Recent Labs     02/09/21  1607 02/09/21  2118 02/10/21  0656 02/10/21  1118   POCGLU 434* 462* 189* 397*       Blood Sugar Average: Last 72 hrs:  (P) 248 9     · Continue the patient's home insulin regimen upon discharge  · Outpatient follow-up with Endocrinology

## 2021-02-10 NOTE — ASSESSMENT & PLAN NOTE
· Outpatient Cardiology evaluation    ECG 12 lead  Order: [id number]  Status:  Final result   Visible to patient:  Yes ([facility] MyChart)   Next appt:  02/18/2021 at 03:50 PM in Family Medicine ([facility])   Ref Range & Units 2/8/21 0637   Ventricular Rate BPM 72    Atrial Rate BPM 72    NM Interval ms 124    QRSD Interval ms 92    QT Interval ms 418    QTC Interval ms 457    P Axis degrees 49    QRS Axis degrees 20    T Wave Axis degrees 85       Narrative & Impression    Normal sinus rhythm  Possible Left atrial enlargement  Left ventricular hypertrophy  Nonspecific T wave abnormality  Abnormal ECG  When compared with ECG of 18-OCT-2020 00:33,  Nonspecific T wave abnormality now evident in Lateral leads  Confirmed by Luz Mcbride ([id number]) on 2/8/2021 12:42:59 PM      Specimen Collected: 02/08/21 06:37   Last Resulted: 02/08/21 12:43

## 2021-02-10 NOTE — UTILIZATION REVIEW
Notification of Discharge  This is a Notification of Discharge from our facility [address]  Please be advised that this patient has been discharge from our facility  Below you will find the admission and discharge date and time including the patients disposition  PRESENTATION DATE: 2/8/2021  5:53 AM  OBS ADMISSION DATE:   IP ADMISSION DATE: 2/8/21 0717   DISCHARGE DATE: 2/10/2021  3:21 PM  DISPOSITION: Home/Self Care Home/Self Care   Admission Orders listed below:  Admission Orders (From admission, onward)     Ordered        02/08/21 0717  Inpatient Admission  Once                   Please contact the UR Department if additional information is required to close this patient's authorization/case  [facility] Utilization Review Department  Main: [phone] x carefully listen to the prompts  All voicemails are confidential   [email or web address]  Send all requests for admission clinical reviews, approved or denied determinations and any other requests to dedicated fax number below belonging to the campus where the patient is receiving treatment   List of dedicated fax numbers:  [address] DENIALS (Administrative/Medical Necessity) [phone]   [address] (Maternity/NICU/Pediatrics) [phone]   Dionicio Greco [phone]   Harpreet Chu [phone]   Krystle Bond [phone]   Merl[place] [address] [phone]   [facility]  [phone]   [address] [facility] [address]  [address] [address] [facility] [phone]

## 2021-02-11 ENCOUNTER — TRANSITIONAL CARE MANAGEMENT (OUTPATIENT)
Dept: FAMILY MEDICINE CLINIC | Facility: CLINIC | Age: 51
End: 2021-02-11

## 2021-02-11 ENCOUNTER — TELEPHONE (OUTPATIENT)
Dept: NEPHROLOGY | Facility: CLINIC | Age: 51
End: 2021-02-11

## 2021-02-11 NOTE — TELEPHONE ENCOUNTER
Do you want patient to come in the office for a hospital follow up or will you see him at the clinic at dialysis

## 2021-02-11 NOTE — TELEPHONE ENCOUNTER
----- Message from Edel Toledo RN sent at 2/10/2021  9:25 AM EST -----  Regarding: follow up appointment  Patient is being discharged today and will need follow up appointment in 1 week  Thank you

## 2021-02-12 DIAGNOSIS — Z71.89 COMPLEX CARE COORDINATION: Primary | ICD-10-CM

## 2021-02-13 LAB
BACTERIA BLD CULT: NORMAL
BACTERIA BLD CULT: NORMAL

## 2021-02-16 ENCOUNTER — PATIENT OUTREACH (OUTPATIENT)
Dept: FAMILY MEDICINE CLINIC | Facility: CLINIC | Age: 51
End: 2021-02-16

## 2021-02-16 NOTE — PROGRESS NOTES
Outpatient Care Management Note:  In basket [facility]  referral received  Chart review completed

## 2021-02-17 ENCOUNTER — PATIENT OUTREACH (OUTPATIENT)
Dept: FAMILY MEDICINE CLINIC | Facility: CLINIC | Age: 51
End: 2021-02-17

## 2021-02-17 NOTE — PROGRESS NOTES
Outpatient Care Management Note:  Outreach call placed to verna  Introduced myself and my role  He is doing well and does not feel that outreach Is necessary    His wife is a nurse and he feels they are "on top of my care "

## 2021-02-18 ENCOUNTER — IMMUNIZATIONS (OUTPATIENT)
Dept: FAMILY MEDICINE CLINIC | Facility: HOSPITAL | Age: 51
End: 2021-02-18

## 2021-02-18 DIAGNOSIS — Z23 ENCOUNTER FOR IMMUNIZATION: Primary | ICD-10-CM

## 2021-02-18 PROCEDURE — 91301 SARS-COV-2 / COVID-19 MRNA VACCINE (MODERNA) 100 MCG: CPT

## 2021-02-18 PROCEDURE — 0012A SARS-COV-2 / COVID-19 MRNA VACCINE (MODERNA) 100 MCG: CPT

## 2021-03-01 DIAGNOSIS — N18.4 TYPE 1 DIABETES MELLITUS WITH STAGE 4 CHRONIC KIDNEY DISEASE (HCC): Primary | ICD-10-CM

## 2021-03-01 DIAGNOSIS — E10.22 TYPE 1 DIABETES MELLITUS WITH STAGE 4 CHRONIC KIDNEY DISEASE (HCC): Primary | ICD-10-CM

## 2021-03-01 DIAGNOSIS — I10 HYPERTENSION: ICD-10-CM

## 2021-03-01 RX ORDER — SYRINGE-NEEDLE,INSULIN,0.5 ML 27GX1/2"
SYRINGE, EMPTY DISPOSABLE MISCELLANEOUS 4 TIMES DAILY
Qty: 360 EACH | Refills: 3 | Status: SHIPPED | OUTPATIENT
Start: 2021-03-01 | End: 2021-05-27 | Stop reason: SDUPTHER

## 2021-03-01 RX ORDER — CARVEDILOL 6.25 MG/1
6.25 TABLET ORAL 2 TIMES DAILY WITH MEALS
Qty: 180 TABLET | Refills: 2 | Status: SHIPPED | OUTPATIENT
Start: 2021-03-01 | End: 2021-07-30 | Stop reason: SDUPTHER

## 2021-03-15 ENCOUNTER — TELEPHONE (OUTPATIENT)
Dept: NEPHROLOGY | Facility: CLINIC | Age: 51
End: 2021-03-15

## 2021-03-15 ENCOUNTER — EPISODE CHANGES (OUTPATIENT)
Dept: CASE MANAGEMENT | Facility: HOSPITAL | Age: 51
End: 2021-03-15

## 2021-03-15 ENCOUNTER — TRANSCRIBE ORDERS (OUTPATIENT)
Dept: ADMINISTRATIVE | Facility: HOSPITAL | Age: 51
End: 2021-03-15

## 2021-03-15 DIAGNOSIS — E10.21 TYPE 1 DIABETES MELLITUS WITH NEPHROPATHY (HCC): Primary | ICD-10-CM

## 2021-03-15 DIAGNOSIS — Z76.82 AWAITING ORGAN TRANSPLANT: ICD-10-CM

## 2021-03-15 DIAGNOSIS — I25.119 ATHEROSCLEROSIS OF NATIVE CORONARY ARTERY WITH ANGINA PECTORIS, UNSPECIFIED WHETHER NATIVE OR TRANSPLANTED HEART (HCC): ICD-10-CM

## 2021-03-15 DIAGNOSIS — Z01.818 ENCOUNTER FOR PRE-TRANSPLANT EVALUATION FOR CHRONIC KIDNEY DISEASE: Primary | ICD-10-CM

## 2021-03-15 RX ORDER — INSULIN GLARGINE 100 [IU]/ML
10 INJECTION, SOLUTION SUBCUTANEOUS EVERY 12 HOURS SCHEDULED
Qty: 15 ML | Refills: 3 | Status: SHIPPED | OUTPATIENT
Start: 2021-03-15 | End: 2021-05-28

## 2021-03-15 NOTE — TELEPHONE ENCOUNTER
Patient called stating the Levemir is no longer covered by his insurance  He states that the [address] [place], [place] [address]ion Joanna Tafoya in [facility] told him that Lantus would be covered for him  If you could please send in a 90 day supply to them

## 2021-03-19 ENCOUNTER — HOSPITAL ENCOUNTER (OUTPATIENT)
Dept: CT IMAGING | Facility: HOSPITAL | Age: 51
Discharge: HOME | End: 2021-03-19
Payer: COMMERCIAL

## 2021-03-19 ENCOUNTER — HOSPITAL ENCOUNTER (OUTPATIENT)
Dept: RADIOLOGY | Facility: HOSPITAL | Age: 51
Discharge: HOME | End: 2021-03-19
Payer: COMMERCIAL

## 2021-03-19 ENCOUNTER — HOSPITAL ENCOUNTER (OUTPATIENT)
Dept: NON INVASIVE DIAGNOSTICS | Facility: HOSPITAL | Age: 51
Discharge: HOME | End: 2021-03-19
Payer: COMMERCIAL

## 2021-03-19 DIAGNOSIS — I25.119 ATHEROSCLEROSIS OF NATIVE CORONARY ARTERY WITH ANGINA PECTORIS, UNSPECIFIED WHETHER NATIVE OR TRANSPLANTED HEART (HCC): ICD-10-CM

## 2021-03-19 DIAGNOSIS — Z01.818 ENCOUNTER FOR PRE-TRANSPLANT EVALUATION FOR CHRONIC KIDNEY DISEASE: ICD-10-CM

## 2021-03-19 DIAGNOSIS — Z76.82 AWAITING ORGAN TRANSPLANT: ICD-10-CM

## 2021-03-19 PROCEDURE — 71046 X-RAY EXAM CHEST 2 VIEWS: CPT

## 2021-03-19 PROCEDURE — 93306 TTE W/DOPPLER COMPLETE: CPT

## 2021-03-19 PROCEDURE — 93306 TTE W/DOPPLER COMPLETE: CPT | Performed by: INTERNAL MEDICINE

## 2021-03-19 PROCEDURE — G1004 CDSM NDSC: HCPCS

## 2021-03-19 PROCEDURE — 74176 CT ABD & PELVIS W/O CONTRAST: CPT

## 2021-04-05 ENCOUNTER — TELEPHONE (OUTPATIENT)
Dept: FAMILY MEDICINE CLINIC | Facility: CLINIC | Age: 51
End: 2021-04-05

## 2021-04-05 DIAGNOSIS — I50.42 CHRONIC COMBINED SYSTOLIC AND DIASTOLIC CONGESTIVE HEART FAILURE (HCC): Primary | ICD-10-CM

## 2021-04-05 NOTE — TELEPHONE ENCOUNTER
Pt was on medication before hospitalization in February & AVS from 2/10/21 said to stop Metoprolol Succ ER 25mg    Pt has been taking it both tabs in the AM & B/P has been staying down & well controlled    Would you be comfortable refilling Rx or would you rather Pt contact Dr Bala Mchugh?     Pt asked for Callback if Rx is refused

## 2021-04-05 NOTE — TELEPHONE ENCOUNTER
There is no metoprolol listed on his drug profile please double check that he is really on it and what dose

## 2021-04-06 RX ORDER — METOPROLOL SUCCINATE 25 MG/1
25 TABLET, EXTENDED RELEASE ORAL 2 TIMES DAILY
Qty: 90 TABLET | Refills: 2 | Status: SHIPPED | OUTPATIENT
Start: 2021-04-06 | End: 2021-07-30 | Stop reason: SDUPTHER

## 2021-04-14 DIAGNOSIS — N18.6 TYPE 1 DIABETES MELLITUS WITH CHRONIC KIDNEY DISEASE ON CHRONIC DIALYSIS (HCC): Primary | ICD-10-CM

## 2021-04-14 DIAGNOSIS — Z99.2 TYPE 1 DIABETES MELLITUS WITH CHRONIC KIDNEY DISEASE ON CHRONIC DIALYSIS (HCC): Primary | ICD-10-CM

## 2021-04-14 DIAGNOSIS — E10.22 TYPE 1 DIABETES MELLITUS WITH CHRONIC KIDNEY DISEASE ON CHRONIC DIALYSIS (HCC): Primary | ICD-10-CM

## 2021-04-19 ENCOUNTER — TELEPHONE (OUTPATIENT)
Dept: FAMILY MEDICINE CLINIC | Facility: CLINIC | Age: 51
End: 2021-04-19

## 2021-04-19 DIAGNOSIS — N18.4 TYPE 1 DIABETES MELLITUS WITH STAGE 4 CHRONIC KIDNEY DISEASE (HCC): Primary | ICD-10-CM

## 2021-04-19 DIAGNOSIS — E10.22 TYPE 1 DIABETES MELLITUS WITH STAGE 4 CHRONIC KIDNEY DISEASE (HCC): Primary | ICD-10-CM

## 2021-04-19 RX ORDER — BLOOD-GLUCOSE METER
1 EACH MISCELLANEOUS DAILY
Qty: 1 KIT | Refills: 0 | Status: SHIPPED | OUTPATIENT
Start: 2021-04-19 | End: 2021-04-19 | Stop reason: CLARIF

## 2021-04-19 RX ORDER — BLOOD-GLUCOSE TRANSMITTER
1 EACH MISCELLANEOUS DAILY
Start: 2021-04-19 | End: 2021-05-10 | Stop reason: SDUPTHER

## 2021-04-19 RX ORDER — BLOOD-GLUCOSE SENSOR
1 EACH MISCELLANEOUS DAILY
Start: 2021-04-19 | End: 2021-05-10 | Stop reason: SDUPTHER

## 2021-04-19 RX ORDER — BLOOD SUGAR DIAGNOSTIC
STRIP MISCELLANEOUS
Qty: 800 EACH | Refills: 3 | Status: SHIPPED | OUTPATIENT
Start: 2021-04-19 | End: 2021-04-19 | Stop reason: CLARIF

## 2021-04-19 RX ORDER — LANCETS 30 GAUGE
EACH MISCELLANEOUS DAILY
Qty: 800 EACH | Refills: 3 | Status: CANCELLED | OUTPATIENT
Start: 2021-04-19

## 2021-04-19 RX ORDER — BLOOD-GLUCOSE METER
1 EACH MISCELLANEOUS DAILY
Qty: 1 KIT | Refills: 0 | Status: CANCELLED | OUTPATIENT
Start: 2021-04-19

## 2021-04-19 RX ORDER — BLOOD SUGAR DIAGNOSTIC
STRIP MISCELLANEOUS
Qty: 800 EACH | Refills: 3 | Status: CANCELLED | OUTPATIENT
Start: 2021-04-19

## 2021-04-19 RX ORDER — BLOOD-GLUCOSE,RECEIVER,CONT
1 EACH MISCELLANEOUS DAILY
Start: 2021-04-19 | End: 2021-05-10 | Stop reason: SDUPTHER

## 2021-04-19 RX ORDER — BLOOD-GLUCOSE SENSOR
1 EACH MISCELLANEOUS DAILY
Status: CANCELLED
Start: 2021-04-19

## 2021-04-19 RX ORDER — BLOOD-GLUCOSE,RECEIVER,CONT
1 EACH MISCELLANEOUS DAILY
Qty: 12 DEVICE | Refills: 3 | Status: CANCELLED
Start: 2021-04-19

## 2021-04-20 DIAGNOSIS — N17.9 ACUTE KIDNEY INJURY (HCC): ICD-10-CM

## 2021-04-20 DIAGNOSIS — I10 HYPERTENSION, UNSPECIFIED TYPE: Primary | ICD-10-CM

## 2021-04-20 RX ORDER — BLOOD SUGAR DIAGNOSTIC
STRIP MISCELLANEOUS
Qty: 700 EACH | Refills: 3 | Status: SHIPPED | OUTPATIENT
Start: 2021-04-20 | End: 2021-04-21 | Stop reason: CLARIF

## 2021-04-20 RX ORDER — ATORVASTATIN CALCIUM 80 MG/1
80 TABLET, FILM COATED ORAL EVERY MORNING
Qty: 90 TABLET | Refills: 1 | Status: SHIPPED | OUTPATIENT
Start: 2021-04-20 | End: 2021-10-12 | Stop reason: SDUPTHER

## 2021-04-20 RX ORDER — LANCETS
EACH MISCELLANEOUS
Qty: 700 EACH | Refills: 3 | Status: SHIPPED | OUTPATIENT
Start: 2021-04-20 | End: 2021-04-21 | Stop reason: CLARIF

## 2021-04-20 RX ORDER — BLOOD-GLUCOSE METER
EACH MISCELLANEOUS DAILY
Qty: 1 KIT | Refills: 1 | Status: SHIPPED | OUTPATIENT
Start: 2021-04-20 | End: 2021-04-21 | Stop reason: CLARIF

## 2021-04-20 RX ORDER — PREDNISONE 1 MG/1
5 TABLET ORAL DAILY
Qty: 90 TABLET | Refills: 1 | Status: SHIPPED | OUTPATIENT
Start: 2021-04-20 | End: 2021-10-12 | Stop reason: SDUPTHER

## 2021-04-21 DIAGNOSIS — N18.4 TYPE 1 DIABETES MELLITUS WITH STAGE 4 CHRONIC KIDNEY DISEASE (HCC): Primary | ICD-10-CM

## 2021-04-21 DIAGNOSIS — E10.22 TYPE 1 DIABETES MELLITUS WITH STAGE 4 CHRONIC KIDNEY DISEASE (HCC): Primary | ICD-10-CM

## 2021-04-21 RX ORDER — BLOOD-GLUCOSE METER
1 EACH MISCELLANEOUS DAILY
Qty: 1 KIT | Refills: 0 | Status: CANCELLED | OUTPATIENT
Start: 2021-04-21

## 2021-04-21 RX ORDER — BLOOD SUGAR DIAGNOSTIC
STRIP MISCELLANEOUS
Qty: 800 EACH | Refills: 3 | Status: SHIPPED | OUTPATIENT
Start: 2021-04-21 | End: 2021-04-23 | Stop reason: SDUPTHER

## 2021-04-21 RX ORDER — BLOOD SUGAR DIAGNOSTIC
STRIP MISCELLANEOUS
Qty: 800 EACH | Refills: 2 | Status: CANCELLED | OUTPATIENT
Start: 2021-04-21

## 2021-04-21 RX ORDER — BLOOD-GLUCOSE METER
1 EACH MISCELLANEOUS DAILY
Qty: 1 KIT | Refills: 0 | Status: SHIPPED | OUTPATIENT
Start: 2021-04-21 | End: 2021-05-28

## 2021-04-23 DIAGNOSIS — E10.22 TYPE 1 DIABETES MELLITUS WITH STAGE 4 CHRONIC KIDNEY DISEASE (HCC): ICD-10-CM

## 2021-04-23 DIAGNOSIS — N18.4 TYPE 1 DIABETES MELLITUS WITH STAGE 4 CHRONIC KIDNEY DISEASE (HCC): ICD-10-CM

## 2021-04-23 RX ORDER — BLOOD SUGAR DIAGNOSTIC
STRIP MISCELLANEOUS
Qty: 600 EACH | Refills: 3 | Status: SHIPPED | OUTPATIENT
Start: 2021-04-23 | End: 2021-05-28

## 2021-05-07 DIAGNOSIS — K92.0 GASTROINTESTINAL HEMORRHAGE WITH HEMATEMESIS: ICD-10-CM

## 2021-05-07 DIAGNOSIS — Z94.0 RENAL TRANSPLANT, STATUS POST: ICD-10-CM

## 2021-05-07 RX ORDER — TACROLIMUS 1 MG/1
CAPSULE ORAL
Qty: 270 CAPSULE | Refills: 3
Start: 2021-05-07 | End: 2021-05-20 | Stop reason: SDUPTHER

## 2021-05-07 RX ORDER — PANTOPRAZOLE SODIUM 40 MG/1
40 TABLET, DELAYED RELEASE ORAL DAILY
Qty: 90 TABLET | Refills: 3 | Status: SHIPPED | OUTPATIENT
Start: 2021-05-07 | End: 2021-05-10 | Stop reason: SDUPTHER

## 2021-05-10 DIAGNOSIS — E10.22 TYPE 1 DIABETES MELLITUS WITH STAGE 4 CHRONIC KIDNEY DISEASE (HCC): ICD-10-CM

## 2021-05-10 DIAGNOSIS — K92.0 GASTROINTESTINAL HEMORRHAGE WITH HEMATEMESIS: ICD-10-CM

## 2021-05-10 DIAGNOSIS — N18.4 TYPE 1 DIABETES MELLITUS WITH STAGE 4 CHRONIC KIDNEY DISEASE (HCC): ICD-10-CM

## 2021-05-10 RX ORDER — BLOOD-GLUCOSE,RECEIVER,CONT
1 EACH MISCELLANEOUS DAILY
Qty: 1 EACH | Refills: 0 | Status: SHIPPED | OUTPATIENT
Start: 2021-05-10 | End: 2021-09-17

## 2021-05-10 RX ORDER — PANTOPRAZOLE SODIUM 40 MG/1
40 TABLET, DELAYED RELEASE ORAL DAILY
Qty: 90 TABLET | Refills: 3 | Status: SHIPPED | OUTPATIENT
Start: 2021-05-10 | End: 2022-05-17 | Stop reason: SDUPTHER

## 2021-05-10 RX ORDER — BLOOD-GLUCOSE TRANSMITTER
1 EACH MISCELLANEOUS DAILY
Qty: 1 EACH | Refills: 0 | Status: SHIPPED | OUTPATIENT
Start: 2021-05-10 | End: 2021-09-17

## 2021-05-10 RX ORDER — BLOOD-GLUCOSE SENSOR
1 EACH MISCELLANEOUS DAILY
Qty: 15 EACH | Refills: 3 | Status: SHIPPED | OUTPATIENT
Start: 2021-05-10 | End: 2021-09-17

## 2021-05-20 ENCOUNTER — TELEPHONE (OUTPATIENT)
Dept: FAMILY MEDICINE CLINIC | Facility: CLINIC | Age: 51
End: 2021-05-20

## 2021-05-20 DIAGNOSIS — Z94.0 RENAL TRANSPLANT, STATUS POST: ICD-10-CM

## 2021-05-20 RX ORDER — TACROLIMUS 1 MG/1
CAPSULE ORAL
Qty: 270 CAPSULE | Refills: 3
Start: 2021-05-20 | End: 2021-05-26 | Stop reason: SDUPTHER

## 2021-05-20 NOTE — TELEPHONE ENCOUNTER
Vitaliy Singh called for refill of Tacrolimus - She was asked to call Dr Mile Otoole for refill because he is the last provider to make an adjustment on the Rx (5/7/21)

## 2021-05-22 ENCOUNTER — APPOINTMENT (OUTPATIENT)
Dept: LAB | Facility: HOSPITAL | Age: 51
End: 2021-05-22

## 2021-05-22 ENCOUNTER — TRANSCRIBE ORDERS (OUTPATIENT)
Dept: ADMINISTRATIVE | Facility: HOSPITAL | Age: 51
End: 2021-05-22

## 2021-05-22 DIAGNOSIS — Z00.8 HEALTH EXAMINATION IN POPULATION SURVEY: Primary | ICD-10-CM

## 2021-05-22 DIAGNOSIS — Z00.8 HEALTH EXAMINATION IN POPULATION SURVEY: ICD-10-CM

## 2021-05-22 LAB
CHOLEST SERPL-MCNC: 82 MG/DL (ref 50–200)
EST. AVERAGE GLUCOSE BLD GHB EST-MCNC: 148 MG/DL
HBA1C MFR BLD: 6.8 %
HDLC SERPL-MCNC: 35 MG/DL
LDLC SERPL CALC-MCNC: 37 MG/DL (ref 0–100)
NONHDLC SERPL-MCNC: 47 MG/DL
TRIGL SERPL-MCNC: 51 MG/DL

## 2021-05-22 PROCEDURE — 83036 HEMOGLOBIN GLYCOSYLATED A1C: CPT

## 2021-05-22 PROCEDURE — 80061 LIPID PANEL: CPT

## 2021-05-22 PROCEDURE — 36415 COLL VENOUS BLD VENIPUNCTURE: CPT

## 2021-05-26 ENCOUNTER — TELEPHONE (OUTPATIENT)
Dept: NEPHROLOGY | Facility: CLINIC | Age: 51
End: 2021-05-26

## 2021-05-26 DIAGNOSIS — I10 HYPERTENSION: ICD-10-CM

## 2021-05-26 DIAGNOSIS — Z94.0 RENAL TRANSPLANT, STATUS POST: ICD-10-CM

## 2021-05-26 RX ORDER — TACROLIMUS 1 MG/1
CAPSULE ORAL
Qty: 270 CAPSULE | Refills: 3
Start: 2021-05-26 | End: 2021-05-27 | Stop reason: SDUPTHER

## 2021-05-26 RX ORDER — NIFEDIPINE 90 MG/1
90 TABLET, EXTENDED RELEASE ORAL DAILY
Qty: 90 TABLET | Refills: 3 | Status: SHIPPED | OUTPATIENT
Start: 2021-05-26 | End: 2021-11-11

## 2021-05-26 NOTE — TELEPHONE ENCOUNTER
----- Message from Lisa Lopez sent at 5/26/2021  8:35 AM EDT -----  Please call Donita Neely and ask him if he was able to obtain his medications (Tacrolimus and bp med) or if this still needs a refill

## 2021-05-26 NOTE — TELEPHONE ENCOUNTER
Patient states that he needs the tacromlius  He spoke with Dr Nicole Tipton office about his b/p med needing to be filled

## 2021-05-27 ENCOUNTER — TELEPHONE (OUTPATIENT)
Dept: NEPHROLOGY | Facility: CLINIC | Age: 51
End: 2021-05-27

## 2021-05-27 DIAGNOSIS — N18.4 TYPE 1 DIABETES MELLITUS WITH STAGE 4 CHRONIC KIDNEY DISEASE (HCC): ICD-10-CM

## 2021-05-27 DIAGNOSIS — E10.22 TYPE 1 DIABETES MELLITUS WITH STAGE 4 CHRONIC KIDNEY DISEASE (HCC): ICD-10-CM

## 2021-05-27 DIAGNOSIS — Z94.0 RENAL TRANSPLANT, STATUS POST: ICD-10-CM

## 2021-05-27 RX ORDER — TACROLIMUS 1 MG/1
CAPSULE ORAL
Qty: 270 CAPSULE | Refills: 3
Start: 2021-05-27 | End: 2021-06-07 | Stop reason: SDUPTHER

## 2021-05-27 RX ORDER — SYRINGE-NEEDLE,INSULIN,0.5 ML 27GX1/2"
SYRINGE, EMPTY DISPOSABLE MISCELLANEOUS 4 TIMES DAILY
Qty: 360 EACH | Refills: 2 | Status: SHIPPED | OUTPATIENT
Start: 2021-05-27 | End: 2021-05-28

## 2021-05-27 NOTE — TELEPHONE ENCOUNTER
----- Message from Rajesh Rumatt Gary Blanka sent at 5/27/2021  1:37 PM EDT -----  Jose Daniels to Damaso at dialysis just now  I have ordered him a bunch of stuff to [facility] including Tac and insulin needles that he never received   Can you call them and see what the deal is  Thanks in advance

## 2021-05-27 NOTE — TELEPHONE ENCOUNTER
The tac was sent to the [facility] in [place] and the other stuff was sent by Dr Talita Ramon to [facility]

## 2021-05-28 ENCOUNTER — TELEPHONE (OUTPATIENT)
Dept: NEPHROLOGY | Facility: CLINIC | Age: 51
End: 2021-05-28

## 2021-05-28 DIAGNOSIS — Z94.0 RENAL TRANSPLANT, STATUS POST: ICD-10-CM

## 2021-05-28 DIAGNOSIS — N18.6 TYPE 1 DIABETES MELLITUS WITH CHRONIC KIDNEY DISEASE ON CHRONIC DIALYSIS (HCC): ICD-10-CM

## 2021-05-28 DIAGNOSIS — Z99.2 TYPE 1 DIABETES MELLITUS WITH CHRONIC KIDNEY DISEASE ON CHRONIC DIALYSIS (HCC): ICD-10-CM

## 2021-05-28 DIAGNOSIS — E10.22 TYPE 1 DIABETES MELLITUS WITH STAGE 4 CHRONIC KIDNEY DISEASE (HCC): Primary | ICD-10-CM

## 2021-05-28 DIAGNOSIS — E10.22 TYPE 1 DIABETES MELLITUS WITH CHRONIC KIDNEY DISEASE ON CHRONIC DIALYSIS (HCC): ICD-10-CM

## 2021-05-28 DIAGNOSIS — N18.4 TYPE 1 DIABETES MELLITUS WITH STAGE 4 CHRONIC KIDNEY DISEASE (HCC): Primary | ICD-10-CM

## 2021-05-28 DIAGNOSIS — E10.9 DIABETES MELLITUS TYPE 1 (HCC): ICD-10-CM

## 2021-05-28 RX ORDER — INSULIN GLARGINE 100 [IU]/ML
10 INJECTION, SOLUTION SUBCUTANEOUS 2 TIMES DAILY
Qty: 20 ML | Refills: 3 | Status: SHIPPED | OUTPATIENT
Start: 2021-05-28 | End: 2022-05-03

## 2021-05-28 NOTE — TELEPHONE ENCOUNTER
----- Message from Danny Peter, [address] Garg sent at 5/28/2021  7:59 AM EDT -----  [facility] pharmacy reached out to me yesterday evening stating they have in their system that Damaso uses insulin pens and does not need syringes  Can you call him and tell him we have to do a med rec and make sure his epic med list is up to date for this pharmacy?  Can you do a med rec with him and update the list?  Thank you in advance

## 2021-05-28 NOTE — TELEPHONE ENCOUNTER
Spoke with pt and I cleaned up his med list   The problem is he always received a vial of insulin which is why he needed the needles  For some reason the insulin pen was sent in this time and he isn't sure why  He said he never received pen needles for the pen so he was using the syringes to draw the insulin out of the pen  He would rather the Vial than the pen  I discontinued Lantus PEN and have requested the vial instead  He injects Lantus BID 10 units  He also uses Humalog 10 units TID PRN  He now has a dexcom so he doesn't need lancets or test strips  He also says he has about a 3 month supply if he ever needed to go back to them

## 2021-05-28 NOTE — TELEPHONE ENCOUNTER
----- Message from Rajesh Moscoso sent at 5/28/2021  2:14 PM EDT -----  Received another message from his pharmacy  Humalog is not covered but novolog is and is only 5$   Is he okay with novolog or does he want us to do prior auth for humalog

## 2021-05-30 DIAGNOSIS — E10.22 TYPE 1 DIABETES MELLITUS WITH STAGE 4 CHRONIC KIDNEY DISEASE (HCC): Primary | ICD-10-CM

## 2021-05-30 DIAGNOSIS — N18.4 TYPE 1 DIABETES MELLITUS WITH STAGE 4 CHRONIC KIDNEY DISEASE (HCC): Primary | ICD-10-CM

## 2021-05-30 NOTE — TELEPHONE ENCOUNTER
Okay I have sent in the novolog   I'm sure he is aware but remind him that novolog works a bit quicker than humalog and can be injected within 5-10 minutes before eating a meal  Please let him know to reach out if he has any other issues and I will see him at dialysis

## 2021-06-07 DIAGNOSIS — I10 HYPERTENSION: ICD-10-CM

## 2021-06-07 DIAGNOSIS — Z94.0 RENAL TRANSPLANT, STATUS POST: ICD-10-CM

## 2021-06-08 RX ORDER — AZATHIOPRINE 50 MG/1
50 TABLET ORAL EVERY MORNING
Qty: 90 TABLET | Refills: 2 | Status: SHIPPED | OUTPATIENT
Start: 2021-06-08 | End: 2022-02-21

## 2021-06-08 RX ORDER — TACROLIMUS 1 MG/1
CAPSULE ORAL
Qty: 270 CAPSULE | Refills: 3 | Status: SHIPPED | OUTPATIENT
Start: 2021-06-08 | End: 2022-06-28

## 2021-09-29 ENCOUNTER — IMMUNIZATIONS (OUTPATIENT)
Dept: FAMILY MEDICINE CLINIC | Facility: HOSPITAL | Age: 51
End: 2021-09-29

## 2021-09-29 DIAGNOSIS — Z23 ENCOUNTER FOR IMMUNIZATION: Primary | ICD-10-CM

## 2021-09-29 PROCEDURE — 91301 SARS-COV-2 / COVID-19 MRNA VACCINE (MODERNA) 100 MCG: CPT

## 2021-09-29 PROCEDURE — 0011A SARS-COV-2 / COVID-19 MRNA VACCINE (MODERNA) 100 MCG: CPT

## 2021-10-19 ENCOUNTER — TELEPHONE (OUTPATIENT)
Dept: NEPHROLOGY | Facility: CLINIC | Age: 51
End: 2021-10-19

## 2021-12-07 ENCOUNTER — TELEPHONE (OUTPATIENT)
Dept: FAMILY MEDICINE CLINIC | Facility: CLINIC | Age: 51
End: 2021-12-07

## 2021-12-08 LAB
EXTERNAL HIV CONFIRMATION: NORMAL
EXTERNAL HIV SCREEN: NORMAL

## 2022-01-07 LAB
LEFT EYE DIABETIC RETINOPATHY: NORMAL
RIGHT EYE DIABETIC RETINOPATHY: NORMAL
SEVERITY (EYE EXAM): NORMAL

## 2022-01-20 ENCOUNTER — HOSPITAL ENCOUNTER (OUTPATIENT)
Dept: MRI IMAGING | Facility: HOSPITAL | Age: 52
Discharge: HOME | End: 2022-01-20
Payer: COMMERCIAL

## 2022-01-20 DIAGNOSIS — Z01.818 ENCOUNTER FOR OTHER PREPROCEDURAL EXAMINATION: ICD-10-CM

## 2022-01-20 PROCEDURE — 74181 MRI ABDOMEN W/O CONTRAST: CPT

## 2022-02-01 ENCOUNTER — TELEPHONE (OUTPATIENT)
Dept: NEPHROLOGY | Facility: CLINIC | Age: 52
End: 2022-02-01

## 2022-02-09 ENCOUNTER — OFFICE VISIT (OUTPATIENT)
Dept: CARDIOLOGY CLINIC | Facility: HOSPITAL | Age: 52
End: 2022-02-09
Payer: COMMERCIAL

## 2022-02-09 VITALS
BODY MASS INDEX: 15.25 KG/M2 | SYSTOLIC BLOOD PRESSURE: 124 MMHG | HEART RATE: 85 BPM | DIASTOLIC BLOOD PRESSURE: 60 MMHG | HEIGHT: 64 IN

## 2022-02-09 DIAGNOSIS — I50.33 ACUTE ON CHRONIC DIASTOLIC CHF (CONGESTIVE HEART FAILURE) (HCC): ICD-10-CM

## 2022-02-09 DIAGNOSIS — Z99.2 ESRD (END STAGE RENAL DISEASE) ON DIALYSIS (HCC): ICD-10-CM

## 2022-02-09 DIAGNOSIS — I34.0 MITRAL VALVE INSUFFICIENCY, UNSPECIFIED ETIOLOGY: ICD-10-CM

## 2022-02-09 DIAGNOSIS — E10.22 TYPE 1 DIABETES MELLITUS WITH STAGE 4 CHRONIC KIDNEY DISEASE (HCC): ICD-10-CM

## 2022-02-09 DIAGNOSIS — I15.1 HYPERTENSION SECONDARY TO OTHER RENAL DISORDERS: ICD-10-CM

## 2022-02-09 DIAGNOSIS — I25.10 CORONARY ARTERY DISEASE INVOLVING NATIVE CORONARY ARTERY OF NATIVE HEART WITHOUT ANGINA PECTORIS: Primary | ICD-10-CM

## 2022-02-09 DIAGNOSIS — N18.6 ESRD (END STAGE RENAL DISEASE) ON DIALYSIS (HCC): ICD-10-CM

## 2022-02-09 DIAGNOSIS — I27.20 PULMONARY HYPERTENSION (HCC): ICD-10-CM

## 2022-02-09 DIAGNOSIS — N28.89 HYPERTENSION SECONDARY TO OTHER RENAL DISORDERS: ICD-10-CM

## 2022-02-09 DIAGNOSIS — N18.4 TYPE 1 DIABETES MELLITUS WITH STAGE 4 CHRONIC KIDNEY DISEASE (HCC): ICD-10-CM

## 2022-02-09 PROCEDURE — 99214 OFFICE O/P EST MOD 30 MIN: CPT | Performed by: INTERNAL MEDICINE

## 2022-02-09 PROCEDURE — 93000 ELECTROCARDIOGRAM COMPLETE: CPT | Performed by: INTERNAL MEDICINE

## 2022-02-09 NOTE — PROGRESS NOTES
Cardiology Follow Up    Nicolasa Story  1970  [id number]  [facility] [address] [id number]  [phone]  [phone]    1  Type 1 diabetes mellitus with stage 4 chronic kidney disease (HCC)  POCT ECG   2  Acute on chronic diastolic CHF (congestive heart failure) ([facility] [address] )     3  Coronary artery disease involving native coronary artery of native heart without angina pectoris     4  Hypertension secondary to other renal disorders     5  Mitral valve insufficiency, unspecified etiology     6  Pulmonary hypertension ([facility] )     7  ESRD (end stage renal disease) on dialysis [facility])         Discussion/Summary:  Coronary disease stable no complaints of angina  Recent nuclear stress test was personally reviewed shows small area of infarct with mild gs-infarct ischemia  Stable with no symptoms  Blood pressure has been well controlled sometimes he is on the low side for now he is asymptomatic and they were able to run dialysis with midodrine pre treatment  Would continue current treatment plan with no change  Lipids have been stable  Recent echocardiogram was reviewed with the patient  Preserved LV function  Interval History:  67-year-old gentleman with a history of coronary disease prior PCI in 0127, chronic diastolic congestive heart failure, end-stage renal disease, peripheral arterial disease, type 1 diabetic presents to establish with me in the office  He was seeing my partners previously  He has had some issues with low blood pressures he was started on midodrine predialysis he has been doing well  Systolic blood pressures at home range from 100-120  Denies any palpitations, lightheadedness, dizziness  There has been no anginal sounding symptoms  He does remain well hydrated  He continues to work  He does some walking with the assistance of a walker due to right lower extremity amputation    There has been no lower extremity edema, PND, orthopnea      Medical Problems             Problem List     Osteomyelitis ([place] [address] )    Foot pain    Type 1 diabetes mellitus ([place] [address] )      Lab Results   Component Value Date    HGBA1C 6 8 (H) 05/22/2021         Renal transplant, status post    Hyperkalemia    Sepsis ([place] [address] )    Acute kidney injury ([place] [address] )    Osteomyelitis ([place] [address] )    Poor circulation    Closed fracture of distal end of right tibia with routine healing    Chronic kidney disease, stage IV (severe) ([place] [address] )    Lab Results   Component Value Date    EGFR 12 02/10/2021    EGFR 14 02/09/2021    EGFR 7 02/08/2021    CREATININE 4 98 (H) 02/10/2021    CREATININE 4 51 (H) 02/09/2021    CREATININE 7 65 (H) 02/08/2021         Chronic osteomyelitis of tibia (HCC)    Immunosuppression ([place] [address] )    Hypertension    DKA (diabetic ketoacidoses)      Lab Results   Component Value Date    HGBA1C 6 8 (H) 05/22/2021         Elevated troponin    Diarrhea    Cellulitis of ankle    Non-ST elevation myocardial infarction (NSTEMI), type 2    Urinary retention (Chronic)    Severe sepsis (HCC)    Acute renal failure (ARF) ([place] [address] )    Acute kidney injury superimposed on CKD ([place] [address] )    Lab Results   Component Value Date    EGFR 12 02/10/2021    EGFR 14 02/09/2021    EGFR 7 02/08/2021    CREATININE 4 98 (H) 02/10/2021    CREATININE 4 51 (H) 02/09/2021    CREATININE 7 65 (H) 30/50/4255         Metabolic acidosis    Hyperphosphatemia    Hyponatremia    Gastrointestinal hemorrhage    Overview Signed 12/20/2018 12:49 PM by GAVINO Andrade     Added automatically from request for surgery [id number]         Bacteremia    S/P AKA (above knee amputation), right (HCC)    Acute blood loss anemia    Acute respiratory failure with hypoxia (HCC)    Pulmonary hypertension (HCC)    Chronic anemia    Depressed left ventricular ejection fraction    Acute pulmonary edema (HCC)    Hx of right BKA (HCC)    Hypertensive urgency    ESRD (end stage renal disease) on dialysis [facility])    Lab Results   Component Value Date EGFR 12 02/10/2021    EGFR 14 02/09/2021    EGFR 7 02/08/2021    CREATININE 4 98 (H) 02/10/2021    CREATININE 4 51 (H) 02/09/2021    CREATININE 7 65 (H) 02/08/2021         Coronary artery disease involving native coronary artery of native heart without angina pectoris    Pre-operative cardiovascular examination    Chronic kidney disease, unspecified    Overview Signed 1/15/2020  1:38 PM by Leeanna Andrea MD     Added automatically from request for surgery [id number]         Lab Results   Component Value Date    EGFR 12 02/10/2021    EGFR 14 02/09/2021    EGFR 7 02/08/2021    CREATININE 4 98 (H) 02/10/2021    CREATININE 4 51 (H) 02/09/2021    CREATININE 7 65 (H) 02/08/2021         Lesion of pancreas    Abnormal CT scan, colon    Respiratory distress    Community acquired pneumonia of right lower lobe of lung    HCAP (healthcare-associated pneumonia)    Acute on chronic respiratory failure with hypoxia (HCC)    Elevated MCV    Hyperbilirubinemia    Acute on chronic diastolic CHF (congestive heart failure) (HCC)    Wt Readings from Last 3 Encounters:   02/10/21 40 3 kg (88 lb 13 5 oz)   11/22/20 48 8 kg (107 lb 9 4 oz)   10/18/20 49 6 kg (109 lb 5 6 oz)                 Elevated procalcitonin    Mitral valve insufficiency    Abnormal EKG              Past Medical History:   Diagnosis Date    Bacteremia 12/21/2018    CAD (coronary artery disease)     s/p MARC to LCx, pLAD 2/2018    Cardiac arrest ([facility] [address] )     Chronic kidney disease     Diabetes mellitus type 1 ([facility] [address] )     Dialysis patient ([facility] [address] )     Access in right chest    GI bleed     Hyperlipidemia     Hypertension     Infection at site of external fixator pin ([facility] [address] )     MI (myocardial infarction) ([facility] [address] )     Pneumonia     Last Assessed [id number]    Renal failure     Renal transplant, status post 07/21/2007     Social History     Socioeconomic History    Marital status: /Civil Union     Spouse name: Not on file    Number of children: Not on file    Years of education: Not on file    Highest education level: Not on file   Occupational History    Not on file   Tobacco Use    Smoking status: Never Smoker    Smokeless tobacco: Never Used   Vaping Use    Vaping Use: Never used   Substance and Sexual Activity    Alcohol use: Not Currently     Alcohol/week: 0 0 standard drinks    Drug use: Never    Sexual activity: Yes     Partners: Female   Other Topics Concern    Not on file   Social History Narrative    No living will     Social Determinants of Health     Financial Resource Strain: Not on file   Food Insecurity: Not on file   Transportation Needs: Not on file   Physical Activity: Not on file   Stress: Not on file   Social Connections: Not on file   Intimate Partner Violence: Not on file   Housing Stability: Not on file      Family History   Problem Relation Age of Onset    Diabetes Brother     Coronary artery disease Mother     No Known Problems Father      Past Surgical History:   Procedure Laterality Date    AMPUTATION Right 02/2019    aboce knee    ANKLE FRACTURE SURGERY      ANKLE HARDWARE REMOVAL Right 7/31/2017    Procedure: REMOVAL HARDWARE ANKLE;  Surgeon: Damaris Aragon MD;  Location: [facility];  Service: Orthopedics    ANKLE HARDWARE REMOVAL Right 8/17/2017    Procedure: TIBIA FAILED HARDWARE REMOVAL;  Surgeon: Damaris Aragon MD;  Location: [facility];  Service: Orthopedics    CLOSED REDUCTION ANKLE Right 7/3/2017    Procedure: CLOSED REDUCTION DISTAL TIB-FIB AND CASTING VS;  Surgeon: Damaris Aragon MD;  Location: [facility];  Service: Orthopedics    CORONARY ANGIOPLASTY WITH STENT PLACEMENT  02/2018    CAD s/p MARC to LCx, pLAD    ESOPHAGOGASTRODUODENOSCOPY N/A 12/20/2018    Procedure: ESOPHAGOGASTRODUODENOSCOPY (EGD) in ICU; Surgeon: Crissy Aguilar MD;  Location: [facility];   Service: Gastroenterology    EYE SURGERY      FRACTURE SURGERY      ORIF Rt Ankle    GLUTAMIC ACID DECARBOXYLASE (HISTORICAL)      IR AV FISTULAGRAM/GRAFTOGRAM 4/16/2020    IR PICC LINE  8/20/2018    IR TUNNELED DIALYSIS CATHETER CHECK/CHANGE/REPOSITION/ANGIOPLASTY  1/8/2020    IR TUNNELED DIALYSIS CATHETER PLACEMENT  10/21/2019    IR TUNNELED DIALYSIS CATHETER REMOVAL  5/29/2020    IR VENOUS LINE REMOVAL  10/5/2018    LEG AMPUTATION Right 02/01/2019    Above the knee    NEPHRECTOMY TRANSPLANTED ORGAN      NM ANASTOMOSIS,AV,ANY SITE Right 2/11/2020    Procedure: CREATION FISTULA ARTERIOVENOUS (AV) right upper extremity;  Surgeon: Mattie Weiss MD;  Location: [address] [place] [address] MAIN OR;  Service: Vascular    NM OPEN TREATMENT FRACTURE DISTAL TIBIA FIBULA Right 7/3/2017    Procedure: OPEN REDUCTION W/ INTERNAL FIXATION (ORIF);   Surgeon: Michele Cooper MD;  Location: [facility];  Service: Orthopedics    TOE AMPUTATION Right 10/27/2016    Procedure: AMPUTATION TOE;  Surgeon: Mayer Schirmer, DPM;  Location: [facility];  Service:        Current Outpatient Medications:     aspirin 81 mg chewable tablet, Chew 81 mg every morning , Disp: , Rfl:     atorvastatin (LIPITOR) 80 mg tablet, Take 1 tablet (80 mg total) by mouth every morning, Disp: 90 tablet, Rfl: 3    azaTHIOprine (IMURAN) 50 mg tablet, Take 1 tablet (50 mg total) by mouth every morning, Disp: 90 tablet, Rfl: 2    glucose blood test strip, Use as instructed, Disp: 600 strip, Rfl: 3    insulin aspart (NovoLOG) 100 units/mL injection, Inject 10 Units under the skin 3 (three) times a day as needed for high blood sugar, Disp: 10 mL, Rfl: 3    insulin glargine (LANTUS) 100 units/mL subcutaneous injection, Inject 10 Units under the skin 2 (two) times a day, Disp: 20 mL, Rfl: 3    isosorbide mononitrate (IMDUR) 30 mg 24 hr tablet, Take 1 tablet (30 mg total) by mouth daily, Disp: 90 tablet, Rfl: 3    metoprolol succinate (Toprol XL) 25 mg 24 hr tablet, Take 1 tablet (25 mg total) by mouth 2 (two) times a day (Patient taking differently: Take 25 mg by mouth daily  ), Disp: 90 tablet, Rfl: 3    midodrine (PROAMATINE) 5 mg tablet, Take 1 tablet (5 mg total) by mouth 3 (three) times a week Take before dialysis, Disp: 30 tablet, Rfl: 2    NIFEdipine (PROCARDIA XL) 30 mg 24 hr tablet, Take 1 tablet (30 mg total) by mouth daily, Disp: 90 tablet, Rfl: 2    pantoprazole (PROTONIX) 40 mg tablet, Take 1 tablet (40 mg total) by mouth daily, Disp: 90 tablet, Rfl: 3    predniSONE 5 mg tablet, Take 1 tablet (5 mg total) by mouth daily, Disp: 90 tablet, Rfl: 3    tacrolimus (PROGRAF) 1 mg capsule, Take 2 tablets in the morning, and 1 at bedtime, Disp: 270 capsule, Rfl: 3    B Complex-C-Folic Acid (Nela-Juan Jose) TABS, TAKE ONE TABLET BY MOUTH ONCE DAILY AFTER DIALYSIS (Patient not taking: Reported on 2/9/2022), Disp: 90 tablet, Rfl: 1    ergocalciferol (ERGOCALCIFEROL) 1 25 MG (71547 UT) capsule, Take 1 capsule (50,000 Units total) by mouth once a week (Patient not taking: Reported on 2/9/2022 ), Disp: 12 capsule, Rfl: 3  No Known Allergies    Labs:     Chemistry        Component Value Date/Time     11/06/2015 0734    K 4 3 02/10/2021 0539    K 4 4 11/06/2015 0734     02/10/2021 0539     11/06/2015 0734    CO2 25 02/10/2021 0539    CO2 13 (L) 02/14/2018 0133    BUN 49 (H) 02/10/2021 0539    BUN 31 (H) 11/06/2015 0734    CREATININE 4 98 (H) 02/10/2021 0539    CREATININE 1 26 11/06/2015 0734        Component Value Date/Time    CALCIUM 10 0 02/10/2021 0539    CALCIUM 8 8 11/06/2015 0734    ALKPHOS 102 02/10/2021 0539    ALKPHOS 164 (H) 11/06/2015 0734    AST 13 02/10/2021 0539    AST 12 11/06/2015 0734    ALT 17 02/10/2021 0539    ALT 34 11/06/2015 0734    BILITOT 0 45 11/06/2015 0734            Lab Results   Component Value Date    CHOL 105 08/18/2015     Lab Results   Component Value Date    HDL 35 (L) 05/22/2021    HDL 37 (L) 02/15/2019    HDL 41 06/23/2018     Lab Results   Component Value Date    LDLCALC 37 05/22/2021    LDLCALC 41 02/15/2019    LDLCALC 41 06/23/2018     Lab Results   Component Value Date    TRIG 51 05/22/2021 TRIG 86 02/15/2019    TRIG 120 06/23/2018     No results found for: CHOLHDL    Imaging: MRI abdomen wo contrast and mrcp    Result Date: 1/21/2022  Narrative: MRI OF THE ABDOMEN WITHOUT CONTRAST WITH MRCP INDICATION:  Abnormality within the pancreas COMPARISON:  CT from 3/19/2021; 6/9/2020; 1/13/2020 TECHNIQUE:  MRI of the abdomen was performed without the administration of contrast using the following  using sequences: Axial T1 weighted in/out of phase images, multiplanar T2 weighted images, diffusion weighted and fat suppressed T1 weighted images  3D MRCP images were obtained with radial thick slabs and projections  3D rendering was performed from the acquisition scanner  FINDINGS: LOWER CHEST:   Unremarkable  LIVER: Normal in size and configuration  There is significant signal loss on in phase imaging as well as T2-weighted imaging suggesting iron deposition  Prior noncontrast CT demonstrated only moderate density of 70 Hounsfield units however  No suspicious mass  The hepatic veins and portal veins are patent  BILE DUCTS:  No intrahepatic or extrahepatic bile duct dilation  Common bile duct is normal in caliber  No choledocholithiasis, biliary stricture or suspicious mass  GALLBLADDER:  Normal  PANCREAS:  The pancreatic duct is normal in caliber  In the pancreatic tail there is a 1 5 x 1 3 mm multilobular cyst with thin septations on image 14, series 4  This demonstrates typical high T2 and low T1 weighted signal  In the pancreatic body there is a 2 1 x 0 8 cm cyst  There is a 1 7 x 1 2 x 1 9 cm multilobular cyst in the pancreatic head correlating to the CT abnormality  Image 19, series 4 and MRCP image #58  MRCP images demonstrate these lesions to be in close proximity with the pancreatic duct suggesting side branch IPMNs  One or 2 other smaller cystic areas are identified along the duct also  The pancreas is not fully characterized without contrast in regards any subtle enhancing lesions   ADRENAL GLANDS:  Normal  SPLEEN:  Signal loss suggest deposition is in the liver  KIDNEYS/PROXIMAL URETERS:  No hydroureteronephrosis  No suspicious renal mass  Atrophic kidneys is consistent with end-stage renal disease  Some renal cysts are seen demonstrating high T2-weighted signal and well-circumscribed  BOWEL:  No dilated loops of bowel  PERITONEAL CAVITY/RETROPERITONEUM:  No ascites  No mass  Renal transplant appears to be present right lower quadrant on the coronal T2-weighted images  Left lower quadrant transplant is thought to be atraumatic be commented on the prior CT  LYMPH NODES:  No abdominal lymphadenopathy  VASCULAR STRUCTURES:  Unremarkable  No aneurysm  ABDOMINAL WALL:  Periumbilical hernia of fat is partially seen  OSSEOUS STRUCTURES:  No suspicious osseous lesion  Impression: Multiple cystic lesions in the pancreas  The largest in the mid pancreas measures up to 2 1 cm in size  1 pancreatic head and correlates to the size and position of the prior CT abnormality  Other lesions are more difficult to appreciate on the CT  Some lesions demonstrate thin septations  Nodular features are not definitely appreciated  The lesions are not fully characterized without contrast  For simple cyst(s) 2 cm or greater recommend gastroenterology and/or surgical oncology consult  EUS is likely now warranted    The recommendations regarding pancreatic findings assumes that patient does not have family history of pancreatic cancer nor have any symptoms potentially attributable to pancreatic cystic lesions (hyperamylasemia, recent-onset diabetes, severe epigastric pain, weight loss, steatorrhea, or jaundice ) If these conditions are not true, then management should be deferred to judgement of specialists such as gastroenterologists or oncologic surgeons   Recommendations are based on recent consensus statements on management of pancreatic cystic lesions from [address] [place] [address] Gastroenterology [place] [address] of Radiology, the journal Pancreatology, and our own institutional consensus  Low signal liver suggests iron deposition and should be correlated with serum ferritin  The study was marked in EPIC for significant notification  Workstation performed: [id number]       ECG:        ROS    Vitals:    02/09/22 1527   BP: 124/60   Pulse: 85     There were no vitals filed for this visit  Height: 5' 4" (162 6 cm)   Body mass index is 15 25 kg/m²  Physical Exam:  Vital signs reviewed  General:  Alert and cooperative, appears stated age, no acute distress  HEENT:  PERRLA, EOMI, no scleral icterus, no conjunctival pallor  Neck:  No lymphadenopathy, no thyromegaly, no carotid bruits, no elevated JVP  Heart:  Regular rate and rhythm, normal S1/S2, no S3/S4, no murmur, rubs or gallops  PMI nondisplaced  Lungs:  Clear to auscultation bilaterally, no wheezes rales or rhonchi  Abdomen:  Soft, non-tender, positive bowel sounds, no rebound or guarding,   no organomegaly   Extremities:  Normal range of motion    No clubbing, cyanosis or edema   Vascular:  2+ pedal pulses  Skin:  No rashes or lesions on exposed skin  Neurologic:  Cranial nerves II-XII grossly intact without focal deficits

## 2022-02-21 DIAGNOSIS — I10 HYPERTENSION: ICD-10-CM

## 2022-02-21 RX ORDER — AZATHIOPRINE 50 MG/1
TABLET ORAL
Qty: 90 TABLET | Refills: 0 | Status: SHIPPED | OUTPATIENT
Start: 2022-02-21 | End: 2022-06-21 | Stop reason: SDUPTHER

## 2022-03-10 DIAGNOSIS — Z99.2 TYPE 1 DIABETES MELLITUS WITH CHRONIC KIDNEY DISEASE ON CHRONIC DIALYSIS (HCC): Primary | ICD-10-CM

## 2022-03-10 DIAGNOSIS — E10.22 TYPE 1 DIABETES MELLITUS WITH CHRONIC KIDNEY DISEASE ON CHRONIC DIALYSIS (HCC): Primary | ICD-10-CM

## 2022-03-10 DIAGNOSIS — N18.6 TYPE 1 DIABETES MELLITUS WITH CHRONIC KIDNEY DISEASE ON CHRONIC DIALYSIS (HCC): Primary | ICD-10-CM

## 2022-03-10 NOTE — TELEPHONE ENCOUNTER
----- Message from Rajesh Moscoso sent at 3/10/2022  1:03 PM EST -----  Patient still did not receive insulin syringe (0 5 mL)  I have resent to [facility]  Can you call them and ask if I am doing something incorrect?

## 2022-04-10 DIAGNOSIS — N18.4 TYPE 1 DIABETES MELLITUS WITH STAGE 4 CHRONIC KIDNEY DISEASE (HCC): ICD-10-CM

## 2022-04-10 DIAGNOSIS — E10.22 TYPE 1 DIABETES MELLITUS WITH STAGE 4 CHRONIC KIDNEY DISEASE (HCC): ICD-10-CM

## 2022-04-20 ENCOUNTER — TELEPHONE (OUTPATIENT)
Dept: FAMILY MEDICINE CLINIC | Facility: CLINIC | Age: 52
End: 2022-04-20

## 2022-05-03 ENCOUNTER — TELEPHONE (OUTPATIENT)
Dept: NEPHROLOGY | Facility: CLINIC | Age: 52
End: 2022-05-03

## 2022-05-03 NOTE — TELEPHONE ENCOUNTER
Call from [address], Lantus is no longer covered by insurance  They are requesting Rx for Semglee be sent there

## 2022-05-09 NOTE — RESULT NOTES
Ernie Lugo  36 y.o. male  1980   [address] [address]  [address]:    Telemedicine Progress Note  Zulema Cheung MD       Encounter Date and Time: May 9, 2022 at 2:10 PM    Consent: Ernie Lugo, who was seen by synchronous (real-time) audio-video technology, and/or his healthcare decision maker, is aware that this patient-initiated, Telehealth encounter on 5/9/2022 is a billable service, with coverage as determined by his insurance carrier. He is aware that he may receive a bill and has provided verbal consent to proceed: Yes. Chief Complaint   Patient presents with    Abdominal Pain     History of Present Illness   Ernie Lugo is a 36 y.o. male was evaluated by synchronous (real-time) audio-video technology from home, through a secure patient portal.  Patient has several concerns she like to address today. 1) abdominal pain: Patient believes he has 2 separate issues here. He has had a history of right flank plain that is mostly resolved. This still comes and goes but however it has greatly improved. The second problem he has is pain in the \"solar plexus. \"  This pain is mostly persistent, is often exacerbated by food and sometimes gets severe enough that prompts an ED visit. He stopped his omeprazole because it did not seem to be providing any relief. At his most recent ED visit last week, he was started on famotidine, however he is uncertain if this is providing any benefit or not. He states that he is scheduled for a endoscopy, however this is not until September. Denies hematochezia or melena. Denies nausea or vomiting. 2) cholesterol: Last lipid panel was elevated. LFTs were modestly increased. Statin therapy was held until ultrasonography of his liver was completed. He reports that since his last visit he is cut back on his alcohol. Only drinking 1 to 2 days/week now instead of daily.   Also started to watch what he is Verified Results  (1)  TACROLIMUS [id number] 08:25AM [facility] Emperor     Test Name Result Flag Reference    35 9 ng/mL HH 2 0 - 20 0   Results confirmed on  dilution  Trough (immediately following                  transplant)                       15 0          Trough (steady state, 2 weeks or                  more after transplant):      3 0 - 8 0                                   Detection Limit = 1 0          Performed by LC-MS/MS technology  Patient drug level exceeds published reference range  Evaluate  clinically for signs of potential toxicity    Performed at:  [address]  [id number]  : Margarita Perez MD, Phone:  [phone] eating. But notes that quotes he is not the best at it yet. \"  Often has feelings of early satiety. His appetite is also decreased due to the anticipation of pain with eating. 3) anxiety/panic attacks: Lorazepam has been beneficial for acute panic attacks. Given recent pain, he has had increased use. Patient also notes that he recently also started going back on vacations. When he travels, he is more likely to use his medication. Last prescription filled for #10 tablets. Review of Systems   Review of Systems   Constitutional: Negative for chills and fever. Gastrointestinal: Positive for abdominal pain. Negative for blood in stool, constipation, diarrhea, melena and nausea. Psychiatric/Behavioral: Positive for substance abuse (Alcohol). The patient is nervous/anxious. Vitals/Objective:     Patient-Reported Vitals 5/9/2022   Patient-Reported Weight 220   Patient-Reported Height 71   Patient-Reported Pulse -   Patient-Reported Temperature -   Patient-Reported Systolic  -   Patient-Reported Diastolic -      General: alert, cooperative, no distress   Mental  status: mental status: alert, oriented to person, place, and time, anxious, pacing around home   Resp: resp: normal effort and no respiratory distress   Neuro: neuro: no gross deficits   Skin: skin: no discoloration or lesions of concern on visible areas   Due to this being a TeleHealth evaluation, many elements of the physical examination are unable to be assessed. Assessment and Plan:       1. Epigastric pain  Differential diagnosis includes gastric/duodenal ulcer, gastritis, pancreatitis. Records from most recent ED visit not available for review. Her lipase was normal and did not suggest pancreatitis at that time. Advised to continue famotidine and see if this is beneficial for him. If not, will switch to alternative PPI. Protonix was sent to his pharmacy.   Would also add back Carafate for 2 weeks to help coat the lining of the stomach and intestines in the event of a gastric or duodenal ulcer.  - pantoprazole (PROTONIX) 40 mg tablet; Take 1 Tablet by mouth daily. Start if famotidine is ineffective  Dispense: 90 Tablet; Refill: 1  - sucralfate (Carafate) 1 gram tablet; Take 1 Tablet by mouth four (4) times daily for 14 days. Dispense: 56 Tablet; Refill: 0    2. Panic attacks  Intermittent use for panic attacks. Is effective when needed. Is requiring slightly increased use due to increased travel and anxiety about pain. Will increase number of pills to 15 per prescription. 2 refills given. - LORazepam (ATIVAN) 1 mg tablet; TAKE 1 TABLET BY MOUTH TWICE DAILY AS NEEDED FOR ANXIETY  Dispense: 15 Tablet; Refill: 2    3. Elevated cholesterol  Mild elevation in LFTs and decrease in alcohol use. Will start on 1/2 tablet of Lipitor and titrate up as long as LFTs remain stable. - atorvastatin (LIPITOR) 40 mg tablet; Take 0.5 Tablets by mouth daily. Dispense: 90 Tablet; Refill: 1  - LIPID PANEL; Future    4. Chronic pain syndrome  Given persistent issues with pain, referral placed for pain management. Patient would like to avoid opioid pain medications if at all possible. May benefit from corticosteroid injections or alternative forms of pain treatment.  - REFERRAL TO PAIN MANAGEMENT    5. Primary hypertension  Stable on metoprolol at this time. No changes to his medication. We discussed the expected course, resolution and complications of the diagnosis(es) in detail. Medication risks, benefits, costs, interactions, and alternatives were discussed as indicated. I advised him to contact the office if his condition worsens, changes or fails to improve as anticipated. He expressed understanding with the diagnosis(es) and plan. Patient understands that this encounter was a temporary measure, and the importance of further follow up and examination was emphasized. Patient verbalized understanding.        Patient informed to follow up: Follow-up and Dispositions  ·   Return in about 3 months (around 8/9/2022), or if symptoms worsen or fail to improve. Routing History     . Electronically Signed: MD Charley Denneyhong Crawford is a 36 y.o. male who was evaluated by an audio-video encounter for concerns as above. Patient identification was verified prior to start of the visit. A caregiver was present when appropriate. Due to this being a TeleHealth encounter (During [facility] public health emergency), evaluation of the following organ systems was limited: Vitals/Constitutional/EENT/Resp/CV/GI//MS/Neuro/Skin/Heme-Lymph-Imm. Pursuant to the emergency declaration under the [facility], [facility] waiver authority and the [employer] Act, this Virtual Visit was conducted, with patient's (and/or legal guardian's) consent, to reduce the patient's risk of exposure to COVID-19 and provide necessary medical care. Services were provided through a synchronous discussion virtually to substitute for in-person clinic visit. I was in the office. The patient was at home. History   Patients past medical, surgical and family histories were reviewed and updated. Past Medical History:   Diagnosis Date    Elevated cholesterol     GERD (gastroesophageal reflux disease)     Hypertension     Panic attacks     PTSD (post-traumatic stress disorder)      No past surgical history on file.   Family History   Problem Relation Age of Onset    Cancer Mother     Stroke Father      Social History     Tobacco Use    Smoking status: Never Smoker    Smokeless tobacco: Never Used   Vaping Use    Vaping Use: Never used   Substance Use Topics    Alcohol use: Yes     Comment: Occasional    Drug use: Never     Patient Active Problem List   Diagnosis Code    Hypertension I10    Panic attacks F41.0    PTSD (post-traumatic stress disorder) F43.10    Elevated cholesterol E78.00          Current Medications/Allergies   Medications and Allergies reviewed:    Current Outpatient Medications   Medication Sig Dispense Refill    LORazepam (ATIVAN) 1 mg tablet TAKE 1 TABLET BY MOUTH TWICE DAILY AS NEEDED FOR ANXIETY 10 Tablet 1    metoprolol succinate (TOPROL-XL) 50 mg XL tablet TAKE 1 TABLET BY MOUTH DAILY 90 Tablet 4    sucralfate (CARAFATE) 1 gram tablet Take 1 g by mouth three (3) times daily.  omeprazole (PRILOSEC) 40 mg capsule Take 1 Capsule by mouth daily.  90 Capsule 1     No Known Allergies

## 2022-05-17 DIAGNOSIS — K92.0 GASTROINTESTINAL HEMORRHAGE WITH HEMATEMESIS: ICD-10-CM

## 2022-05-17 RX ORDER — PANTOPRAZOLE SODIUM 40 MG/1
40 TABLET, DELAYED RELEASE ORAL DAILY
Qty: 90 TABLET | Refills: 3 | Status: SHIPPED | OUTPATIENT
Start: 2022-05-17 | End: 2022-10-03

## 2022-06-02 ENCOUNTER — DOCUMENTATION (OUTPATIENT)
Dept: OTHER | Facility: HOSPITAL | Age: 52
End: 2022-06-02

## 2022-06-02 DIAGNOSIS — I25.10 CORONARY ARTERY DISEASE INVOLVING NATIVE CORONARY ARTERY OF NATIVE HEART WITHOUT ANGINA PECTORIS: Primary | ICD-10-CM

## 2022-06-16 ENCOUNTER — DOCUMENTATION (OUTPATIENT)
Dept: NEPHROLOGY | Facility: CLINIC | Age: 52
End: 2022-06-16

## 2022-06-16 DIAGNOSIS — E83.39 HYPERPHOSPHATEMIA: Primary | ICD-10-CM

## 2022-06-16 RX ORDER — AZATHIOPRINE 50 MG/1
50 TABLET ORAL DAILY
Qty: 90 TABLET | Refills: 1 | Status: CANCELLED | OUTPATIENT
Start: 2022-06-16

## 2022-06-21 DIAGNOSIS — I10 HYPERTENSION: ICD-10-CM

## 2022-06-21 DIAGNOSIS — I25.10 CORONARY ARTERY DISEASE INVOLVING NATIVE CORONARY ARTERY OF NATIVE HEART WITHOUT ANGINA PECTORIS: ICD-10-CM

## 2022-06-21 RX ORDER — AZATHIOPRINE 50 MG/1
50 TABLET ORAL DAILY
Qty: 90 TABLET | Refills: 3 | Status: SHIPPED | OUTPATIENT
Start: 2022-06-21 | End: 2023-06-29 | Stop reason: SDUPTHER

## 2022-06-21 RX ORDER — CARVEDILOL 6.25 MG/1
6.25 TABLET ORAL 2 TIMES DAILY WITH MEALS
Qty: 180 TABLET | Refills: 3 | Status: SHIPPED | OUTPATIENT
Start: 2022-06-21 | End: 2022-07-05 | Stop reason: SDUPTHER

## 2022-06-27 DIAGNOSIS — Z94.0 RENAL TRANSPLANT, STATUS POST: ICD-10-CM

## 2022-06-28 ENCOUNTER — TELEPHONE (OUTPATIENT)
Dept: NEPHROLOGY | Facility: CLINIC | Age: 52
End: 2022-06-28

## 2022-06-28 DIAGNOSIS — N18.6 TYPE 1 DIABETES MELLITUS WITH END-STAGE RENAL DISEASE (ESRD) (HCC): Primary | ICD-10-CM

## 2022-06-28 DIAGNOSIS — Z94.0 RENAL TRANSPLANT, STATUS POST: ICD-10-CM

## 2022-06-28 DIAGNOSIS — E10.22 TYPE 1 DIABETES MELLITUS WITH END-STAGE RENAL DISEASE (ESRD) (HCC): Primary | ICD-10-CM

## 2022-06-28 RX ORDER — INSULIN LISPRO 100 [IU]/ML
10 INJECTION, SOLUTION INTRAVENOUS; SUBCUTANEOUS
Qty: 3 ML | Refills: 5 | Status: SHIPPED | OUTPATIENT
Start: 2022-06-28 | End: 2022-06-30

## 2022-06-28 RX ORDER — TACROLIMUS 1 MG/1
CAPSULE ORAL
Qty: 270 CAPSULE | Refills: 0 | Status: SHIPPED | OUTPATIENT
Start: 2022-06-28

## 2022-06-28 RX ORDER — TACROLIMUS 1 MG/1
CAPSULE ORAL
Qty: 270 CAPSULE | Refills: 0 | Status: SHIPPED | OUTPATIENT
Start: 2022-06-28 | End: 2022-06-28

## 2022-06-28 NOTE — TELEPHONE ENCOUNTER
Call from [address] [place] [address]  Rx should be Christelle Brothers as previously had  Not humalog cartrige  Can you please send new rx

## 2022-06-30 ENCOUNTER — TELEPHONE (OUTPATIENT)
Dept: ADMINISTRATIVE | Facility: OTHER | Age: 52
End: 2022-06-30

## 2022-06-30 DIAGNOSIS — N18.6 TYPE 1 DIABETES MELLITUS WITH END-STAGE RENAL DISEASE (ESRD) (HCC): Primary | ICD-10-CM

## 2022-06-30 DIAGNOSIS — E10.22 TYPE 1 DIABETES MELLITUS WITH END-STAGE RENAL DISEASE (ESRD) (HCC): Primary | ICD-10-CM

## 2022-06-30 NOTE — TELEPHONE ENCOUNTER
Upon review of the In Basket request we were able to locate, review, and update the patient chart as requested for Diabetic Eye Exam     Any additional questions or concerns should be emailed to the Practice Liaisons via [email or web address] email, please do not reply via In Basket      Thank you  Monica Banegas

## 2022-06-30 NOTE — TELEPHONE ENCOUNTER
----- Message from Sweetie Petit sent at 6/29/2022  3:51 PM EDT -----  Regarding: care gap request  06/29/22 3:51 PM    Hello, our patient attached above has had Diabetic Eye Exam ([address]) completed/performed  Please assist in updating the patient chart by pulling the document from the Media Tab  The date of service is 1/7/22       Thank you,  Sweetie [facility] [address]

## 2022-07-05 DIAGNOSIS — E83.39 HYPERPHOSPHATEMIA: Primary | ICD-10-CM

## 2022-07-05 DIAGNOSIS — I25.10 CORONARY ARTERY DISEASE INVOLVING NATIVE CORONARY ARTERY OF NATIVE HEART WITHOUT ANGINA PECTORIS: ICD-10-CM

## 2022-07-05 RX ORDER — CALCIUM ACETATE 667 MG/1
667 CAPSULE ORAL
Qty: 270 CAPSULE | Refills: 3 | Status: SHIPPED | OUTPATIENT
Start: 2022-07-05 | End: 2022-07-07

## 2022-07-05 RX ORDER — CARVEDILOL 6.25 MG/1
6.25 TABLET ORAL 2 TIMES DAILY WITH MEALS
Qty: 180 TABLET | Refills: 0 | Status: SHIPPED | OUTPATIENT
Start: 2022-07-05 | End: 2023-05-02 | Stop reason: SDUPTHER

## 2022-07-05 RX ORDER — SEVELAMER CARBONATE 800 MG/1
800 TABLET, FILM COATED ORAL
Qty: 450 TABLET | Refills: 1 | Status: SHIPPED | OUTPATIENT
Start: 2022-07-05 | End: 2022-10-03

## 2022-07-12 ENCOUNTER — TELEPHONE (OUTPATIENT)
Dept: ADMINISTRATIVE | Facility: OTHER | Age: 52
End: 2022-07-12

## 2022-07-12 NOTE — TELEPHONE ENCOUNTER
----- Message from [facility] sent at 7/12/2022 12:15 PM EDT -----  Regarding: HIV  07/12/22 12:15 PM    Hello, our patient Lakshmi Lynn has had HIV completed/performed  Please assist in updating the patient chart by pulling the Care Everywhere (CE) document  The date of service is 12/08/2021       Thank you,  Vero Stone   [facility]

## 2022-07-12 NOTE — TELEPHONE ENCOUNTER
Upon review of the In Basket request we were able to locate, review, and update the patient chart as requested for HIV  Any additional questions or concerns should be emailed to the Practice Liaisons via [email or web address] email, please do not reply via In Basket      Thank you  Luca Yao MA

## 2022-08-21 ENCOUNTER — APPOINTMENT (OUTPATIENT)
Dept: LAB | Facility: HOSPITAL | Age: 52
End: 2022-08-21

## 2022-08-21 DIAGNOSIS — Z00.8 HEALTH EXAMINATION IN POPULATION SURVEY: ICD-10-CM

## 2022-08-21 LAB
CHOLEST SERPL-MCNC: 81 MG/DL
EST. AVERAGE GLUCOSE BLD GHB EST-MCNC: 166 MG/DL
HBA1C MFR BLD: 7.4 %
HDLC SERPL-MCNC: 41 MG/DL
LDLC SERPL CALC-MCNC: 29 MG/DL (ref 0–100)
NONHDLC SERPL-MCNC: 40 MG/DL
TRIGL SERPL-MCNC: 54 MG/DL

## 2022-08-21 PROCEDURE — 36415 COLL VENOUS BLD VENIPUNCTURE: CPT

## 2022-08-21 PROCEDURE — 80061 LIPID PANEL: CPT

## 2022-08-21 PROCEDURE — 83036 HEMOGLOBIN GLYCOSYLATED A1C: CPT

## 2022-08-24 ENCOUNTER — OFFICE VISIT (OUTPATIENT)
Dept: FAMILY MEDICINE CLINIC | Facility: CLINIC | Age: 52
End: 2022-08-24
Payer: COMMERCIAL

## 2022-08-24 VITALS
DIASTOLIC BLOOD PRESSURE: 60 MMHG | WEIGHT: 115 LBS | HEART RATE: 79 BPM | SYSTOLIC BLOOD PRESSURE: 152 MMHG | OXYGEN SATURATION: 98 % | TEMPERATURE: 97.6 F | HEIGHT: 64 IN | BODY MASS INDEX: 19.63 KG/M2

## 2022-08-24 DIAGNOSIS — S78.119A AMPUTATION ABOVE KNEE (HCC): ICD-10-CM

## 2022-08-24 DIAGNOSIS — Z00.00 ANNUAL PHYSICAL EXAM: ICD-10-CM

## 2022-08-24 DIAGNOSIS — Z00.00 MEDICARE ANNUAL WELLNESS VISIT, SUBSEQUENT: Primary | ICD-10-CM

## 2022-08-24 DIAGNOSIS — B35.1 ONYCHOMYCOSIS: ICD-10-CM

## 2022-08-24 PROCEDURE — 99396 PREV VISIT EST AGE 40-64: CPT | Performed by: FAMILY MEDICINE

## 2022-08-24 NOTE — PROGRESS NOTES
ADULT ANNUAL [address] PRIMARY CARE    NAME: Cristela Ordonez  AGE: 45 y o  SEX: male  : 1970     DATE: 2022     Assessment and Plan:     Problem List Items Addressed This Visit    None     Visit Diagnoses     Medicare annual wellness visit, subsequent    -  Primary    Onychomycosis        Amputation above knee ([facility] [address] )              Immunizations and preventive care screenings were discussed with patient today  Appropriate education was printed on patient's after visit summary  Counseling:  Alcohol/drug use: discussed moderation in alcohol intake, the recommendations for healthy alcohol use, and avoidance of illicit drug use  Dental Health: discussed importance of regular tooth brushing, flossing, and dental visits  Injury prevention: discussed safety/seat belts, safety helmets, smoke detectors, carbon dioxide detectors, and smoking near bedding or upholstery  Sexual health: discussed sexually transmitted diseases, partner selection, use of condoms, avoidance of unintended pregnancy, and contraceptive alternatives  · Exercise: the importance of regular exercise/physical activity was discussed  Recommend exercise 3-5 times per week for at least 30 minutes  Depression Screening and Follow-up Plan: Patient was screened for depression during today's encounter  They screened negative with a PHQ-2 score of 0  No follow-ups on file  Chief Complaint:     Chief Complaint   Patient presents with    Annual Exam     Annual Wellness      History of Present Illness:     Adult Annual Physical   Patient here for a comprehensive physical exam  The patient reports no problems  Diet and Physical Activity  · Diet/Nutrition: well balanced diet  · Exercise: walking        Depression Screening  PHQ-2/9 Depression Screening    Little interest or pleasure in doing things: 0 - not at all  Feeling down, depressed, or hopeless: 0 - not at all  PHQ-2 Score: 0  PHQ-2 Interpretation: Negative depression screen       General Health  · Sleep: gets 7-8 hours of sleep on average  · Hearing: normal - bilateral   · Vision: most recent eye exam <1 year ago  · Dental: regular dental visits   Health  · Symptoms include: none     Review of Systems:     Review of Systems   Constitutional: Negative for activity change, appetite change, diaphoresis, fatigue and fever  HENT: Negative  Negative for dental problem  Eyes: Positive for visual disturbance  Patient is planning on having a laser cleansing of his cataract lens in the left eye   Respiratory: Negative for apnea, cough, chest tightness, shortness of breath and wheezing  Cardiovascular: Negative for chest pain, palpitations and leg swelling  Gastrointestinal: Negative for abdominal distention, abdominal pain, anal bleeding, constipation, diarrhea, nausea and vomiting  Endocrine: Negative for cold intolerance, heat intolerance, polydipsia, polyphagia and polyuria  Genitourinary: Negative for difficulty urinating, dysuria, flank pain, hematuria and urgency  Currently on dialysis 3 times weekly doing exceedingly well   Musculoskeletal: Negative for arthralgias, back pain, gait problem, joint swelling and myalgias  Skin: Negative for color change, rash and wound  Allergic/Immunologic: Negative for environmental allergies, food allergies and immunocompromised state  Neurological: Negative for dizziness, seizures, syncope, speech difficulty, numbness and headaches  Hematological: Negative for adenopathy  Does not bruise/bleed easily  Psychiatric/Behavioral: Negative for agitation, behavioral problems, hallucinations, sleep disturbance and suicidal ideas        Past Medical History:     Past Medical History:   Diagnosis Date    Bacteremia 12/21/2018    CAD (coronary artery disease)     s/p MARC to LCx, pLAD 2/2018    Cardiac arrest (HCC)     Chronic kidney disease     Diabetes mellitus type 1 ([facility] [address] )     Dialysis patient [facility])     Access in right chest    GI bleed     Hyperlipidemia     Hypertension     Infection at site of external fixator pin ([facility] [address] )     MI (myocardial infarction) ([facility] [address] )     Pneumonia     Last Assessed [id number]    Renal failure     Renal transplant, status post 07/21/2007      Past Surgical History:     Past Surgical History:   Procedure Laterality Date    AMPUTATION Right 02/2019    aboce knee    ANKLE FRACTURE SURGERY      ANKLE HARDWARE REMOVAL Right 7/31/2017    Procedure: REMOVAL HARDWARE ANKLE;  Surgeon: Abril Coreas MD;  Location: [facility];  Service: Orthopedics    ANKLE HARDWARE REMOVAL Right 8/17/2017    Procedure: TIBIA FAILED HARDWARE REMOVAL;  Surgeon: Abril Coreas MD;  Location: [facility];  Service: Orthopedics    CLOSED REDUCTION ANKLE Right 7/3/2017    Procedure: CLOSED REDUCTION DISTAL TIB-FIB AND CASTING VS;  Surgeon: Abril Coreas MD;  [facility]: [facility];  Service: Orthopedics    CORONARY ANGIOPLASTY WITH STENT PLACEMENT  02/2018    CAD s/p MARC to LCx, pLAD    ESOPHAGOGASTRODUODENOSCOPY N/A 12/20/2018    Procedure: ESOPHAGOGASTRODUODENOSCOPY (EGD) in ICU; Surgeon: Yaneli Vogt MD;  Location: [facility];   Service: Gastroenterology    EYE SURGERY      FRACTURE SURGERY      ORIF Rt Ankle    GLUTAMIC ACID DECARBOXYLASE (HISTORICAL)      IR AV FISTULAGRAM/GRAFTOGRAM  4/16/2020    IR PICC LINE  8/20/2018    IR TUNNELED DIALYSIS CATHETER CHECK/CHANGE/REPOSITION/ANGIOPLASTY  1/8/2020    IR TUNNELED DIALYSIS CATHETER PLACEMENT  10/21/2019    IR TUNNELED DIALYSIS CATHETER REMOVAL  5/29/2020    IR VENOUS LINE REMOVAL  10/5/2018    LEG AMPUTATION Right 02/01/2019    Above the knee    NEPHRECTOMY TRANSPLANTED ORGAN      ID ANASTOMOSIS,AV,ANY SITE Right 2/11/2020    Procedure: CREATION FISTULA ARTERIOVENOUS (AV) right upper extremity;  Surgeon: Christelle Matt MD;  Location: [facility] MAIN OR;  Service: Vascular    ID OPEN TREATMENT FRACTURE DISTAL TIBIA FIBULA Right 7/3/2017    Procedure: OPEN REDUCTION W/ INTERNAL FIXATION (ORIF);   Surgeon: Elva Pelaez MD;  Location: [facility];  Service: Orthopedics    TOE AMPUTATION Right 10/27/2016    Procedure: AMPUTATION TOE;  Surgeon: Winston Her DPM;  Location: [facility];  Service:       Family History:     Family History   Problem Relation Age of Onset    Diabetes Brother     Coronary artery disease Mother     No Known Problems Father       Social History:     Social History     Socioeconomic History    Marital status: /Civil Union     Spouse name: None    Number of children: None    Years of education: None    Highest education level: None   Occupational History    None   Tobacco Use    Smoking status: Never Smoker    Smokeless tobacco: Never Used   Vaping Use    Vaping Use: Never used   Substance and Sexual Activity    Alcohol use: Not Currently     Alcohol/week: 0 0 standard drinks    Drug use: Never    Sexual activity: Yes     Partners: Female   Other Topics Concern    None   Social History Narrative    No living will     Social Determinants of Health     Financial Resource Strain: Not on file   Food Insecurity: Not on file   Transportation Needs: Not on file   Physical Activity: Not on file   Stress: Not on file   Social Connections: Not on file   Intimate Partner Violence: Not on file   Housing Stability: Not on file      Current Medications:     Current Outpatient Medications   Medication Sig Dispense Refill    aspirin 81 mg chewable tablet Chew 81 mg every morning       atorvastatin (LIPITOR) 80 mg tablet Take 1 tablet (80 mg total) by mouth every morning 90 tablet 3    azaTHIOprine (IMURAN) 50 mg tablet Take 1 tablet (50 mg total) by mouth daily 90 tablet 3    B Complex-C-Folic Acid (Nela-Juan Jose) TABS Apply 1 tablet to the mouth or throat in the morning 90 tablet 3    carvedilol (COREG) 6 25 mg tablet Take 1 tablet (6 25 mg total) by mouth 2 (two) times a day with meals 180 tablet 0    glucose blood test strip Use as instructed 600 strip 3    insulin aspart (NovoLOG) 100 units/mL injection Inject 10 Units under the skin 3 (three) times a day with meals 30 mL 4    insulin glargine (Semglee) 100 units/mL subcutaneous injection Inject 10 Units under the skin every 12 (twelve) hours 10 mL 3    Insulin Syringe-Needle U-100 31G X 15/64" 0 5 ML MISC Use 3 (three) times a day 100 each 4    isosorbide mononitrate (IMDUR) 30 mg 24 hr tablet Take 1 tablet (30 mg total) by mouth daily 90 tablet 0    NIFEdipine (PROCARDIA XL) 30 mg 24 hr tablet Take 1 tablet (30 mg total) by mouth daily 90 tablet 2    pantoprazole (PROTONIX) 40 mg tablet Take 1 tablet (40 mg total) by mouth in the morning  90 tablet 3    predniSONE 5 mg tablet Take 1 tablet (5 mg total) by mouth daily 90 tablet 3    sevelamer carbonate (RENVELA) 800 mg tablet Take 1 tablet (800 mg total) by mouth 3 (three) times a day with meals And 1 tab with snacks and beverages 450 tablet 1    tacrolimus (PROGRAF) 1 mg capsule TAKE 2 CAPSULES BY MOUTH EVERY MORNING AND ONE CAPSULE EVERY DAY AT BEDTIME 270 capsule 0    cinacalcet (SENSIPAR) 30 mg tablet Take 1 tablet (30 mg total) by mouth daily (Patient not taking: Reported on 8/24/2022) 90 tablet 3    midodrine (PROAMATINE) 5 mg tablet Take 1 tablet (5 mg total) by mouth 3 (three) times a week Take before dialysis (Patient not taking: Reported on 8/24/2022) 30 tablet 2     No current facility-administered medications for this visit  Allergies:     No Known Allergies   Physical Exam:     /60 (BP Location: Left arm, Patient Position: Sitting, Cuff Size: Standard)   Pulse 79   Temp 97 6 °F (36 4 °C)   Ht 5' 4" (1 626 m)   Wt 52 2 kg (115 lb)   SpO2 98%   BMI 19 74 kg/m²     Physical Exam  Constitutional:       Appearance: He is well-developed  HENT:      Head: Normocephalic and atraumatic        Right Ear: External ear normal       Left Ear: External ear normal       Nose: Nose normal    Eyes:      Conjunctiva/sclera: Conjunctivae normal       Pupils: Pupils are equal, round, and reactive to light  Cardiovascular:      Rate and Rhythm: Normal rate and regular rhythm  Heart sounds: Normal heart sounds  No murmur heard  No friction rub  Pulmonary:      Effort: Pulmonary effort is normal  No respiratory distress  Breath sounds: Normal breath sounds  No wheezing or rales  Chest:      Chest wall: No tenderness  Abdominal:      General: Bowel sounds are normal       Palpations: Abdomen is soft  Musculoskeletal:         General: Normal range of motion  Cervical back: Normal range of motion and neck supple  Skin:     General: Skin is warm and dry  Capillary Refill: Capillary refill takes 2 to 3 seconds  Neurological:      Mental Status: He is alert and oriented to person, place, and time  Psychiatric:         Behavior: Behavior normal          Thought Content:  Thought content normal          Judgment: Judgment normal           Author DO Ga  [address] [facility]

## 2022-08-24 NOTE — PATIENT INSTRUCTIONS

## 2022-09-30 ENCOUNTER — OFFICE VISIT (OUTPATIENT)
Dept: CARDIOLOGY CLINIC | Facility: HOSPITAL | Age: 52
End: 2022-09-30
Payer: MEDICARE

## 2022-09-30 VITALS
DIASTOLIC BLOOD PRESSURE: 68 MMHG | HEIGHT: 64 IN | HEART RATE: 81 BPM | BODY MASS INDEX: 19.74 KG/M2 | SYSTOLIC BLOOD PRESSURE: 122 MMHG

## 2022-09-30 DIAGNOSIS — I50.33 ACUTE ON CHRONIC DIASTOLIC CHF (CONGESTIVE HEART FAILURE) (HCC): ICD-10-CM

## 2022-09-30 DIAGNOSIS — I16.0 HYPERTENSIVE URGENCY: ICD-10-CM

## 2022-09-30 DIAGNOSIS — I27.20 PULMONARY HYPERTENSION (HCC): ICD-10-CM

## 2022-09-30 DIAGNOSIS — I34.0 MITRAL VALVE INSUFFICIENCY, UNSPECIFIED ETIOLOGY: ICD-10-CM

## 2022-09-30 DIAGNOSIS — I25.10 CORONARY ARTERY DISEASE INVOLVING NATIVE CORONARY ARTERY OF NATIVE HEART WITHOUT ANGINA PECTORIS: ICD-10-CM

## 2022-09-30 DIAGNOSIS — Z12.11 ENCOUNTER FOR SCREENING COLONOSCOPY: Primary | ICD-10-CM

## 2022-09-30 PROCEDURE — 99214 OFFICE O/P EST MOD 30 MIN: CPT | Performed by: INTERNAL MEDICINE

## 2022-09-30 NOTE — PROGRESS NOTES
Cardiology Follow Up    Romelia Smith  1970  [id number]  [address] [id number]  [phone]  [phone]    1  Encounter for screening colonoscopy  Ambulatory referral for colon cancer education   2  Coronary artery disease involving native coronary artery of native heart without angina pectoris     3  Hypertensive urgency     4  Pulmonary hypertension ([facility] [address] )     5  Acute on chronic diastolic CHF (congestive heart failure) ([facility] )     6  Mitral valve insufficiency, unspecified etiology         Discussion/Summary:  Overall he is doing well coronary disease stable no complaints of angina  Blood pressure is well controlled lipids are doing quite well with an LDL in the low 20s  No further testing at this point time he will need an echocardiogram in about a year  Continue current medical treatment plan no symptoms follow-up in 8 months  Interval History:  16-year-old gentleman with a history of coronary disease prior PCI in 4818, chronic diastolic congestive heart failure, end-stage renal disease, peripheral arterial disease, type 1 diabetic presents to establish with me in the office  He was seeing my partners previously  He has had some issues with low blood pressures he was started on midodrine predialysis he has been doing well  Systolic blood pressures at home range from 100-120  Denies any palpitations, lightheadedness, dizziness  There has been no anginal sounding symptoms  He does remain well hydrated  He continues to work  He does some walking with the assistance of a walker due to right lower extremity amputation  There has been no lower extremity edema, PND, orthopnea  He continues to remain physically active he is working as a   He does not do a lot of ambulation due to lower extremity amputation    Denies any chest pain or discomfort, shortness of breath, palpitations, lightheadedness, dizziness, or syncope  There has been no adverse bleeding  He takes all medications as prescribed      Medical Problems             Problem List     Osteomyelitis ([place] [address] )    Foot pain    Type 1 diabetes mellitus ([place] [address] )      Lab Results   Component Value Date    HGBA1C 7 4 (H) 08/21/2022         Renal transplant, status post    Hyperkalemia    Sepsis ([place] [address] )    Acute kidney injury ([place] [address] )    Osteomyelitis ([place] [address] )    Poor circulation    Closed fracture of distal end of right tibia with routine healing    Chronic kidney disease, stage IV (severe) ([place] [address] )    Lab Results   Component Value Date    EGFR 12 02/10/2021    EGFR 14 02/09/2021    EGFR 7 02/08/2021    CREATININE 4 98 (H) 02/10/2021    CREATININE 4 51 (H) 02/09/2021    CREATININE 7 65 (H) 02/08/2021         Chronic osteomyelitis of tibia (HCC)    Immunosuppression ([place] [address] )    Hypertension    DKA (diabetic ketoacidoses)      Lab Results   Component Value Date    HGBA1C 7 4 (H) 08/21/2022         Elevated troponin    Diarrhea    Cellulitis of ankle    Non-ST elevation myocardial infarction (NSTEMI), type 2    Urinary retention (Chronic)    Severe sepsis ([facility])    Acute renal failure (ARF) ([place] [address] )    Acute kidney injury superimposed on CKD ([place] [address] )    Lab Results   Component Value Date    EGFR 12 02/10/2021    EGFR 14 02/09/2021    EGFR 7 02/08/2021    CREATININE 4 98 (H) 02/10/2021    CREATININE 4 51 (H) 02/09/2021    CREATININE 7 65 (H) 67/94/3294         Metabolic acidosis    Hyperphosphatemia    Hyponatremia    Gastrointestinal hemorrhage    Overview Signed 12/20/2018 12:49 PM by GAVINO Palomino     Added automatically from request for surgery [id number]         Bacteremia    S/P AKA (above knee amputation), right (HCC)    Acute blood loss anemia    Acute respiratory failure with hypoxia ([place] [address] )    Pulmonary hypertension (HCC)    Chronic anemia    Depressed left ventricular ejection fraction    Acute pulmonary edema (HCC)    Hx of right BKA ([place] [address] )    Hypertensive urgency    ESRD (end stage renal disease) on dialysis [facility])    Lab Results   Component Value Date    EGFR 12 02/10/2021    EGFR 14 02/09/2021    EGFR 7 02/08/2021    CREATININE 4 98 (H) 02/10/2021    CREATININE 4 51 (H) 02/09/2021    CREATININE 7 65 (H) 02/08/2021         Coronary artery disease involving native coronary artery of native heart without angina pectoris    Pre-operative cardiovascular examination    Chronic kidney disease, unspecified    Overview Signed 1/15/2020  1:38 PM by Hallie Youssef MD     Added automatically from request for surgery [id number]         Lab Results   Component Value Date    EGFR 12 02/10/2021    EGFR 14 02/09/2021    EGFR 7 02/08/2021    CREATININE 4 98 (H) 02/10/2021    CREATININE 4 51 (H) 02/09/2021    CREATININE 7 65 (H) 02/08/2021         Lesion of pancreas    Abnormal CT scan, colon    Respiratory distress    Community acquired pneumonia of right lower lobe of lung    HCAP (healthcare-associated pneumonia)    Acute on chronic respiratory failure with hypoxia (HCC)    Elevated MCV    Hyperbilirubinemia    Acute on chronic diastolic CHF (congestive heart failure) (HCC)    Wt Readings from Last 3 Encounters:   08/24/22 52 2 kg (115 lb)   02/10/21 40 3 kg (88 lb 13 5 oz)   11/22/20 48 8 kg (107 lb 9 4 oz)                 Elevated procalcitonin    Mitral valve insufficiency    Abnormal EKG              Past Medical History:   Diagnosis Date    Bacteremia 12/21/2018    CAD (coronary artery disease)     s/p MARC to LCx, pLAD 2/2018    Cardiac arrest ([facility] [address] )     Chronic kidney disease     Diabetes mellitus type 1 ([facility] )     Dialysis patient [facility])     Access in right chest    GI bleed     Hyperlipidemia     Hypertension     Infection at site of external fixator pin ([facility] )     MI (myocardial infarction) ([facility] )     Pneumonia     Last Assessed [id number]    Renal failure     Renal transplant, status post 07/21/2007     Social History     Socioeconomic History    Marital status: /Civil Union Spouse name: Not on file    Number of children: Not on file    Years of education: Not on file    Highest education level: Not on file   Occupational History    Not on file   Tobacco Use    Smoking status: Never Smoker    Smokeless tobacco: Never Used   Vaping Use    Vaping Use: Never used   Substance and Sexual Activity    Alcohol use: Not Currently     Alcohol/week: 0 0 standard drinks    Drug use: Never    Sexual activity: Yes     Partners: Female   Other Topics Concern    Not on file   Social History Narrative    No living will     Social Determinants of Health     Financial Resource Strain: Not on file   Food Insecurity: Not on file   Transportation Needs: Not on file   Physical Activity: Not on file   Stress: Not on file   Social Connections: Not on file   Intimate Partner Violence: Not on file   Housing Stability: Not on file      Family History   Problem Relation Age of Onset    Diabetes Brother     Coronary artery disease Mother     No Known Problems Father      Past Surgical History:   Procedure Laterality Date    AMPUTATION Right 02/2019    aboce knee    ANKLE FRACTURE SURGERY      ANKLE HARDWARE REMOVAL Right 7/31/2017    Procedure: REMOVAL HARDWARE ANKLE;  Surgeon: Estefanía Flowers MD;  Location: [facility];  Service: Orthopedics    ANKLE HARDWARE REMOVAL Right 8/17/2017    Procedure: TIBIA FAILED HARDWARE REMOVAL;  Surgeon: Estefanía Flowers MD;  [facility]: [facility];  Service: Orthopedics    CLOSED REDUCTION ANKLE Right 7/3/2017    Procedure: CLOSED REDUCTION DISTAL TIB-FIB AND CASTING VS;  Surgeon: Estefanía Flowers MD;  [facility]: [facility];  Service: Orthopedics    CORONARY ANGIOPLASTY WITH STENT PLACEMENT  02/2018    CAD s/p MARC to LCx, pLAD    ESOPHAGOGASTRODUODENOSCOPY N/A 12/20/2018    Procedure: ESOPHAGOGASTRODUODENOSCOPY (EGD) in ICU; Surgeon: Cholo Alcantar MD;  Location: [facility];   Service: Gastroenterology    EYE SURGERY      FRACTURE SURGERY      ORIF Rt Ankle    GLUTAMIC ACID DECARBOXYLASE (HISTORICAL)      IR AV FISTULAGRAM/GRAFTOGRAM  4/16/2020    IR PICC LINE  8/20/2018    IR TUNNELED DIALYSIS CATHETER CHECK/CHANGE/REPOSITION/ANGIOPLASTY  1/8/2020    IR TUNNELED DIALYSIS CATHETER PLACEMENT  10/21/2019    IR TUNNELED DIALYSIS CATHETER REMOVAL  5/29/2020    IR VENOUS LINE REMOVAL  10/5/2018    LEG AMPUTATION Right 02/01/2019    Above the knee    NEPHRECTOMY TRANSPLANTED ORGAN      MO ANASTOMOSIS,AV,ANY SITE Right 2/11/2020    Procedure: CREATION FISTULA ARTERIOVENOUS (AV) right upper extremity;  Surgeon: Feng Jackson MD;  Location: [address] [place] [address] MAIN OR;  Service: Vascular    MO OPEN TREATMENT FRACTURE DISTAL TIBIA FIBULA Right 7/3/2017    Procedure: OPEN REDUCTION W/ INTERNAL FIXATION (ORIF);   Surgeon: Johnson Bonds MD;  [facility]: [facility];  Service: Orthopedics    TOE AMPUTATION Right 10/27/2016    Procedure: AMPUTATION TOE;  Surgeon: Ariana Carrasco DPM;  Location: [facility];  Service:        Current Outpatient Medications:     aspirin 81 mg chewable tablet, Chew 81 mg every morning , Disp: , Rfl:     atorvastatin (LIPITOR) 80 mg tablet, Take 1 tablet (80 mg total) by mouth every morning, Disp: 90 tablet, Rfl: 3    azaTHIOprine (IMURAN) 50 mg tablet, Take 1 tablet (50 mg total) by mouth daily, Disp: 90 tablet, Rfl: 3    carvedilol (COREG) 6 25 mg tablet, Take 1 tablet (6 25 mg total) by mouth 2 (two) times a day with meals, Disp: 180 tablet, Rfl: 0    insulin aspart (NovoLOG) 100 units/mL injection, Inject 10 Units under the skin 3 (three) times a day with meals, Disp: 30 mL, Rfl: 4    insulin glargine (Semglee) 100 units/mL subcutaneous injection, Inject 10 Units under the skin every 12 (twelve) hours, Disp: 10 mL, Rfl: 3    Insulin Syringe-Needle U-100 31G X 15/64" 0 5 ML MISC, Use 3 (three) times a day, Disp: 100 each, Rfl: 4    isosorbide mononitrate (IMDUR) 30 mg 24 hr tablet, Take 1 tablet (30 mg total) by mouth daily, Disp: 90 tablet, Rfl: 0   NIFEdipine (PROCARDIA XL) 30 mg 24 hr tablet, Take 1 tablet (30 mg total) by mouth daily, Disp: 90 tablet, Rfl: 2    predniSONE 5 mg tablet, Take 1 tablet (5 mg total) by mouth daily, Disp: 90 tablet, Rfl: 3    tacrolimus (PROGRAF) 1 mg capsule, TAKE 2 CAPSULES BY MOUTH EVERY MORNING AND ONE CAPSULE EVERY DAY AT BEDTIME, Disp: 270 capsule, Rfl: 0    B Complex-C-Folic Acid (Nela-Juan Jose) TABS, Apply 1 tablet to the mouth or throat in the morning (Patient not taking: Reported on 9/30/2022), Disp: 90 tablet, Rfl: 3    cinacalcet (SENSIPAR) 30 mg tablet, Take 1 tablet (30 mg total) by mouth daily (Patient not taking: Reported on 9/30/2022), Disp: 90 tablet, Rfl: 3    glucose blood test strip, Use as instructed (Patient not taking: Reported on 9/30/2022), Disp: 600 strip, Rfl: 3    midodrine (PROAMATINE) 5 mg tablet, Take 1 tablet (5 mg total) by mouth 3 (three) times a week Take before dialysis (Patient not taking: No sig reported), Disp: 30 tablet, Rfl: 2    pantoprazole (PROTONIX) 40 mg tablet, Take 1 tablet (40 mg total) by mouth in the morning   (Patient not taking: Reported on 9/30/2022), Disp: 90 tablet, Rfl: 3    sevelamer carbonate (RENVELA) 800 mg tablet, Take 1 tablet (800 mg total) by mouth 3 (three) times a day with meals And 1 tab with snacks and beverages (Patient not taking: Reported on 9/30/2022), Disp: 450 tablet, Rfl: 1  No Known Allergies    Labs:     Chemistry        Component Value Date/Time     11/06/2015 0734    K 4 3 02/10/2021 0539    K 4 4 11/06/2015 0734     02/10/2021 0539     11/06/2015 0734    CO2 25 02/10/2021 0539    CO2 13 (L) 02/14/2018 0133    BUN 49 (H) 02/10/2021 0539    BUN 31 (H) 11/06/2015 0734    CREATININE 4 98 (H) 02/10/2021 0539    CREATININE 1 26 11/06/2015 0734        Component Value Date/Time    CALCIUM 10 0 02/10/2021 0539    CALCIUM 8 8 11/06/2015 0734    ALKPHOS 102 02/10/2021 0539    ALKPHOS 164 (H) 11/06/2015 0734    AST 13 02/10/2021 0539    AST 12 11/06/2015 0734    ALT 17 02/10/2021 0539    ALT 34 11/06/2015 0734    BILITOT 0 45 11/06/2015 0734            Lab Results   Component Value Date    CHOL 105 08/18/2015     Lab Results   Component Value Date    HDL 41 08/21/2022    HDL 35 (L) 05/22/2021    HDL 37 (L) 02/15/2019     Lab Results   Component Value Date    LDLCALC 29 08/21/2022    LDLCALC 37 05/22/2021    LDLCALC 41 02/15/2019     Lab Results   Component Value Date    TRIG 54 08/21/2022    TRIG 51 05/22/2021    TRIG 86 02/15/2019     No results found for: CHOLHDL    Imaging: MRI abdomen wo contrast and mrcp    Result Date: 1/21/2022  Narrative: MRI OF THE ABDOMEN WITHOUT CONTRAST WITH MRCP INDICATION:  Abnormality within the pancreas COMPARISON:  CT from 3/19/2021; 6/9/2020; 1/13/2020 TECHNIQUE:  MRI of the abdomen was performed without the administration of contrast using the following  using sequences: Axial T1 weighted in/out of phase images, multiplanar T2 weighted images, diffusion weighted and fat suppressed T1 weighted images  3D MRCP images were obtained with radial thick slabs and projections  3D rendering was performed from the acquisition scanner  FINDINGS: LOWER CHEST:   Unremarkable  LIVER: Normal in size and configuration  There is significant signal loss on in phase imaging as well as T2-weighted imaging suggesting iron deposition  Prior noncontrast CT demonstrated only moderate density of 70 Hounsfield units however  No suspicious mass  The hepatic veins and portal veins are patent  BILE DUCTS:  No intrahepatic or extrahepatic bile duct dilation  Common bile duct is normal in caliber  No choledocholithiasis, biliary stricture or suspicious mass  GALLBLADDER:  Normal  PANCREAS:  The pancreatic duct is normal in caliber  In the pancreatic tail there is a 1 5 x 1 3 mm multilobular cyst with thin septations on image 14, series 4    This demonstrates typical high T2 and low T1 weighted signal  In the pancreatic body there is a 2 1 x 0 8 cm cyst  There is a 1 7 x 1 2 x 1 9 cm multilobular cyst in the pancreatic head correlating to the CT abnormality  Image 19, series 4 and MRCP image #58  MRCP images demonstrate these lesions to be in close proximity with the pancreatic duct suggesting side branch IPMNs  One or 2 other smaller cystic areas are identified along the duct also  The pancreas is not fully characterized without contrast in regards any subtle enhancing lesions  ADRENAL GLANDS:  Normal  SPLEEN:  Signal loss suggest deposition is in the liver  KIDNEYS/PROXIMAL URETERS:  No hydroureteronephrosis  No suspicious renal mass  Atrophic kidneys is consistent with end-stage renal disease  Some renal cysts are seen demonstrating high T2-weighted signal and well-circumscribed  BOWEL:  No dilated loops of bowel  PERITONEAL CAVITY/RETROPERITONEUM:  No ascites  No mass  Renal transplant appears to be present right lower quadrant on the coronal T2-weighted images  Left lower quadrant transplant is thought to be atraumatic be commented on the prior CT  LYMPH NODES:  No abdominal lymphadenopathy  VASCULAR STRUCTURES:  Unremarkable  No aneurysm  ABDOMINAL WALL:  Periumbilical hernia of fat is partially seen  OSSEOUS STRUCTURES:  No suspicious osseous lesion  Impression: Multiple cystic lesions in the pancreas  The largest in the mid pancreas measures up to 2 1 cm in size  1 pancreatic head and correlates to the size and position of the prior CT abnormality  Other lesions are more difficult to appreciate on the CT  Some lesions demonstrate thin septations  Nodular features are not definitely appreciated  The lesions are not fully characterized without contrast  For simple cyst(s) 2 cm or greater recommend gastroenterology and/or surgical oncology consult  EUS is likely now warranted      The recommendations regarding pancreatic findings assumes that patient does not have family history of pancreatic cancer nor have any symptoms potentially attributable to pancreatic cystic lesions (hyperamylasemia, recent-onset diabetes, severe epigastric pain, weight loss, steatorrhea, or jaundice ) If these conditions are not true, then management should be deferred to judgement of specialists such as gastroenterologists or oncologic surgeons  Recommendations are based on recent consensus statements on management of pancreatic cystic lesions from [address] [place] [address] Gastroenterology Association, [employer] of [employer], the journal Pancreatology, and our own institutional consensus  Low signal liver suggests iron deposition and should be correlated with serum ferritin  The study was marked in EPIC for significant notification  Workstation performed: [id number]       ECG:        ROS    Vitals:    09/30/22 1410   BP: 122/68   Pulse: 81     Vitals:     Height: 5' 4" (162 6 cm)   Body mass index is 19 74 kg/m²  Physical Exam:  Vital signs reviewed  General:  Alert and cooperative, appears stated age, no acute distress  HEENT:  PERRLA, EOMI, no scleral icterus, no conjunctival pallor  Neck:  No lymphadenopathy, no thyromegaly, no carotid bruits, no elevated JVP  Heart:  Regular rate and rhythm, normal S1/S2, no S3/S4, no murmur, rubs or gallops  PMI nondisplaced  Lungs:  Clear to auscultation bilaterally, no wheezes rales or rhonchi  Abdomen:  Soft, non-tender, positive bowel sounds, no rebound or guarding,   no organomegaly   Extremities:  Normal range of motion    No clubbing, cyanosis or edema   Vascular:  2+ pedal pulses  Skin:  No rashes or lesions on exposed skin  Neurologic:  Cranial nerves II-XII grossly intact without focal deficits  Psych:  Normal mood and affect

## 2022-10-01 ENCOUNTER — APPOINTMENT (EMERGENCY)
Dept: CT IMAGING | Facility: HOSPITAL | Age: 52
DRG: 871 | End: 2022-10-01
Payer: MEDICARE

## 2022-10-01 ENCOUNTER — APPOINTMENT (OUTPATIENT)
Dept: RADIOLOGY | Facility: HOSPITAL | Age: 52
DRG: 871 | End: 2022-10-01
Payer: MEDICARE

## 2022-10-01 ENCOUNTER — HOSPITAL ENCOUNTER (INPATIENT)
Facility: HOSPITAL | Age: 52
LOS: 2 days | Discharge: HOME | DRG: 871 | End: 2022-10-03
Attending: EMERGENCY MEDICINE | Admitting: INTERNAL MEDICINE
Payer: MEDICARE

## 2022-10-01 DIAGNOSIS — Z99.2 ESRD (END STAGE RENAL DISEASE) ON DIALYSIS (HCC): ICD-10-CM

## 2022-10-01 DIAGNOSIS — R41.89 UNRESPONSIVE EPISODE: ICD-10-CM

## 2022-10-01 DIAGNOSIS — T17.908A ASPIRATION INTO AIRWAY, INITIAL ENCOUNTER: ICD-10-CM

## 2022-10-01 DIAGNOSIS — R11.2 NAUSEA AND VOMITING: Primary | ICD-10-CM

## 2022-10-01 DIAGNOSIS — N18.6 ESRD (END STAGE RENAL DISEASE) ON DIALYSIS (HCC): ICD-10-CM

## 2022-10-01 DIAGNOSIS — E10.22 TYPE 1 DIABETES MELLITUS WITH DIABETIC CHRONIC KIDNEY DISEASE, UNSPECIFIED CKD STAGE (HCC): Primary | ICD-10-CM

## 2022-10-01 DIAGNOSIS — R09.02 HYPOXIA: ICD-10-CM

## 2022-10-01 DIAGNOSIS — E10.22 TYPE 1 DIABETES MELLITUS WITH DIABETIC CHRONIC KIDNEY DISEASE, UNSPECIFIED CKD STAGE (HCC): ICD-10-CM

## 2022-10-01 PROBLEM — K52.9 GASTROENTERITIS: Status: ACTIVE | Noted: 2022-10-01

## 2022-10-01 PROBLEM — R55 VASOVAGAL SYNCOPE: Status: ACTIVE | Noted: 2022-10-01

## 2022-10-01 PROBLEM — R78.81 BACTEREMIA: Status: RESOLVED | Noted: 2018-12-21 | Resolved: 2022-10-01

## 2022-10-01 PROBLEM — N17.9 ACUTE KIDNEY INJURY SUPERIMPOSED ON CKD (HCC): Status: RESOLVED | Noted: 2018-12-20 | Resolved: 2022-10-01

## 2022-10-01 PROBLEM — N18.4 CHRONIC KIDNEY DISEASE, STAGE IV (SEVERE) (HCC): Status: RESOLVED | Noted: 2017-09-23 | Resolved: 2022-10-01

## 2022-10-01 PROBLEM — E87.20 METABOLIC ACIDOSIS: Status: RESOLVED | Noted: 2018-12-20 | Resolved: 2022-10-01

## 2022-10-01 PROBLEM — N17.9 ACUTE RENAL FAILURE (ARF) (HCC): Status: RESOLVED | Noted: 2018-12-20 | Resolved: 2022-10-01

## 2022-10-01 PROBLEM — N18.9 ACUTE KIDNEY INJURY SUPERIMPOSED ON CKD (HCC): Status: RESOLVED | Noted: 2018-12-20 | Resolved: 2022-10-01

## 2022-10-01 LAB
2HR DELTA HS TROPONIN: -4 NG/L
ALBUMIN SERPL BCP-MCNC: 4 G/DL (ref 3.5–5)
ALP SERPL-CCNC: 131 U/L (ref 46–116)
ALT SERPL W P-5'-P-CCNC: 20 U/L (ref 12–78)
ANION GAP SERPL CALCULATED.3IONS-SCNC: 12 MMOL/L (ref 4–13)
APTT PPP: 32 SECONDS (ref 23–37)
AST SERPL W P-5'-P-CCNC: 8 U/L (ref 5–45)
ATRIAL RATE: 68 BPM
BASE EX.OXY STD BLDV CALC-SCNC: 31.1 % (ref 60–80)
BASE EXCESS BLDV CALC-SCNC: 1 MMOL/L
BASOPHILS # BLD AUTO: 0.09 THOUSANDS/ΜL (ref 0–0.1)
BASOPHILS NFR BLD AUTO: 1 % (ref 0–1)
BILIRUB SERPL-MCNC: 0.61 MG/DL (ref 0.2–1)
BUN SERPL-MCNC: 30 MG/DL (ref 5–25)
CALCIUM SERPL-MCNC: 9.7 MG/DL (ref 8.3–10.1)
CARDIAC TROPONIN I PNL SERPL HS: 11 NG/L
CARDIAC TROPONIN I PNL SERPL HS: 15 NG/L
CHLORIDE SERPL-SCNC: 94 MMOL/L (ref 96–108)
CO2 SERPL-SCNC: 28 MMOL/L (ref 21–32)
CREAT SERPL-MCNC: 4.18 MG/DL (ref 0.6–1.3)
EOSINOPHIL # BLD AUTO: 0.27 THOUSAND/ΜL (ref 0–0.61)
EOSINOPHIL NFR BLD AUTO: 2 % (ref 0–6)
ERYTHROCYTE [DISTWIDTH] IN BLOOD BY AUTOMATED COUNT: 13.7 % (ref 11.6–15.1)
FLUAV RNA RESP QL NAA+PROBE: NEGATIVE
FLUBV RNA RESP QL NAA+PROBE: NEGATIVE
GFR SERPL CREATININE-BSD FRML MDRD: 15 ML/MIN/1.73SQ M
GLUCOSE SERPL-MCNC: 103 MG/DL (ref 65–140)
GLUCOSE SERPL-MCNC: 129 MG/DL (ref 65–140)
GLUCOSE SERPL-MCNC: 213 MG/DL (ref 65–140)
GLUCOSE SERPL-MCNC: 228 MG/DL (ref 65–140)
HCO3 BLDV-SCNC: 28.1 MMOL/L (ref 24–30)
HCT VFR BLD AUTO: 36.6 % (ref 36.5–49.3)
HGB BLD-MCNC: 12.4 G/DL (ref 12–17)
IMM GRANULOCYTES # BLD AUTO: 0.08 THOUSAND/UL (ref 0–0.2)
IMM GRANULOCYTES NFR BLD AUTO: 0 % (ref 0–2)
INR PPP: 0.92 (ref 0.84–1.19)
LACTATE SERPL-SCNC: 1.2 MMOL/L (ref 0.5–2)
LYMPHOCYTES # BLD AUTO: 1.6 THOUSANDS/ΜL (ref 0.6–4.47)
LYMPHOCYTES NFR BLD AUTO: 9 % (ref 14–44)
MCH RBC QN AUTO: 35 PG (ref 26.8–34.3)
MCHC RBC AUTO-ENTMCNC: 33.9 G/DL (ref 31.4–37.4)
MCV RBC AUTO: 103 FL (ref 82–98)
MONOCYTES # BLD AUTO: 1.21 THOUSAND/ΜL (ref 0.17–1.22)
MONOCYTES NFR BLD AUTO: 7 % (ref 4–12)
NEUTROPHILS # BLD AUTO: 15.04 THOUSANDS/ΜL (ref 1.85–7.62)
NEUTS SEG NFR BLD AUTO: 81 % (ref 43–75)
NRBC BLD AUTO-RTO: 0 /100 WBCS
O2 CT BLDV-SCNC: 5.6 ML/DL
P AXIS: 75 DEGREES
PCO2 BLDV: 56 MM HG (ref 42–50)
PH BLDV: 7.32 [PH] (ref 7.3–7.4)
PLATELET # BLD AUTO: 271 THOUSANDS/UL (ref 149–390)
PMV BLD AUTO: 8.5 FL (ref 8.9–12.7)
PO2 BLDV: 20.1 MM HG (ref 35–45)
POTASSIUM SERPL-SCNC: 3.3 MMOL/L (ref 3.5–5.3)
PR INTERVAL: 142 MS
PROCALCITONIN SERPL-MCNC: 0.77 NG/ML
PROT SERPL-MCNC: 8.6 G/DL (ref 6.4–8.4)
PROTHROMBIN TIME: 12.5 SECONDS (ref 11.6–14.5)
QRS AXIS: 80 DEGREES
QRSD INTERVAL: 100 MS
QT INTERVAL: 426 MS
QTC INTERVAL: 452 MS
RBC # BLD AUTO: 3.54 MILLION/UL (ref 3.88–5.62)
RSV RNA RESP QL NAA+PROBE: NEGATIVE
SARS-COV-2 RNA RESP QL NAA+PROBE: NEGATIVE
SODIUM SERPL-SCNC: 134 MMOL/L (ref 135–147)
T WAVE AXIS: 85 DEGREES
VENTRICULAR RATE: 68 BPM
WBC # BLD AUTO: 18.29 THOUSAND/UL (ref 4.31–10.16)

## 2022-10-01 PROCEDURE — 71045 X-RAY EXAM CHEST 1 VIEW: CPT

## 2022-10-01 PROCEDURE — 74176 CT ABD & PELVIS W/O CONTRAST: CPT

## 2022-10-01 PROCEDURE — 94760 N-INVAS EAR/PLS OXIMETRY 1: CPT

## 2022-10-01 PROCEDURE — 84484 ASSAY OF TROPONIN QUANT: CPT | Performed by: EMERGENCY MEDICINE

## 2022-10-01 PROCEDURE — 87040 BLOOD CULTURE FOR BACTERIA: CPT | Performed by: EMERGENCY MEDICINE

## 2022-10-01 PROCEDURE — 80197 ASSAY OF TACROLIMUS: CPT | Performed by: EMERGENCY MEDICINE

## 2022-10-01 PROCEDURE — 96374 THER/PROPH/DIAG INJ IV PUSH: CPT

## 2022-10-01 PROCEDURE — 82948 REAGENT STRIP/BLOOD GLUCOSE: CPT

## 2022-10-01 PROCEDURE — 71250 CT THORAX DX C-: CPT

## 2022-10-01 PROCEDURE — 82805 BLOOD GASES W/O2 SATURATION: CPT | Performed by: EMERGENCY MEDICINE

## 2022-10-01 PROCEDURE — 99223 1ST HOSP IP/OBS HIGH 75: CPT | Performed by: INTERNAL MEDICINE

## 2022-10-01 PROCEDURE — 84145 PROCALCITONIN (PCT): CPT | Performed by: EMERGENCY MEDICINE

## 2022-10-01 PROCEDURE — 99291 CRITICAL CARE FIRST HOUR: CPT | Performed by: EMERGENCY MEDICINE

## 2022-10-01 PROCEDURE — 5A1D70Z PERFORMANCE OF URINARY FILTRATION, INTERMITTENT, LESS THAN 6 HOURS PER DAY: ICD-10-PCS | Performed by: INTERNAL MEDICINE

## 2022-10-01 PROCEDURE — 87081 CULTURE SCREEN ONLY: CPT | Performed by: INTERNAL MEDICINE

## 2022-10-01 PROCEDURE — G1004 CDSM NDSC: HCPCS

## 2022-10-01 PROCEDURE — 80053 COMPREHEN METABOLIC PANEL: CPT | Performed by: EMERGENCY MEDICINE

## 2022-10-01 PROCEDURE — 85025 COMPLETE CBC W/AUTO DIFF WBC: CPT | Performed by: EMERGENCY MEDICINE

## 2022-10-01 PROCEDURE — 96366 THER/PROPH/DIAG IV INF ADDON: CPT

## 2022-10-01 PROCEDURE — 36415 COLL VENOUS BLD VENIPUNCTURE: CPT | Performed by: EMERGENCY MEDICINE

## 2022-10-01 PROCEDURE — 96375 TX/PRO/DX INJ NEW DRUG ADDON: CPT

## 2022-10-01 PROCEDURE — 96365 THER/PROPH/DIAG IV INF INIT: CPT

## 2022-10-01 PROCEDURE — 96367 TX/PROPH/DG ADDL SEQ IV INF: CPT

## 2022-10-01 PROCEDURE — 85610 PROTHROMBIN TIME: CPT | Performed by: EMERGENCY MEDICINE

## 2022-10-01 PROCEDURE — 93005 ELECTROCARDIOGRAM TRACING: CPT

## 2022-10-01 PROCEDURE — 85730 THROMBOPLASTIN TIME PARTIAL: CPT | Performed by: EMERGENCY MEDICINE

## 2022-10-01 PROCEDURE — 0241U HB NFCT DS VIR RESP RNA 4 TRGT: CPT | Performed by: EMERGENCY MEDICINE

## 2022-10-01 PROCEDURE — 99291 CRITICAL CARE FIRST HOUR: CPT

## 2022-10-01 PROCEDURE — 83605 ASSAY OF LACTIC ACID: CPT | Performed by: EMERGENCY MEDICINE

## 2022-10-01 PROCEDURE — 70450 CT HEAD/BRAIN W/O DYE: CPT

## 2022-10-01 RX ORDER — ATORVASTATIN CALCIUM 40 MG/1
80 TABLET, FILM COATED ORAL EVERY MORNING
Status: DISCONTINUED | OUTPATIENT
Start: 2022-10-02 | End: 2022-10-03 | Stop reason: HOSPADM

## 2022-10-01 RX ORDER — ALBUTEROL SULFATE 2.5 MG/3ML
2.5 SOLUTION RESPIRATORY (INHALATION) EVERY 4 HOURS PRN
Status: DISCONTINUED | OUTPATIENT
Start: 2022-10-01 | End: 2022-10-03 | Stop reason: HOSPADM

## 2022-10-01 RX ORDER — ONDANSETRON 2 MG/ML
4 INJECTION INTRAMUSCULAR; INTRAVENOUS EVERY 4 HOURS PRN
Status: DISCONTINUED | OUTPATIENT
Start: 2022-10-01 | End: 2022-10-03 | Stop reason: HOSPADM

## 2022-10-01 RX ORDER — INSULIN GLARGINE 100 [IU]/ML
10 INJECTION, SOLUTION SUBCUTANEOUS EVERY 12 HOURS SCHEDULED
Status: DISCONTINUED | OUTPATIENT
Start: 2022-10-01 | End: 2022-10-03 | Stop reason: HOSPADM

## 2022-10-01 RX ORDER — METRONIDAZOLE 500 MG/100ML
500 INJECTION, SOLUTION INTRAVENOUS EVERY 8 HOURS
Status: DISCONTINUED | OUTPATIENT
Start: 2022-10-01 | End: 2022-10-03 | Stop reason: HOSPADM

## 2022-10-01 RX ORDER — ACETAMINOPHEN 325 MG/1
650 TABLET ORAL EVERY 6 HOURS PRN
Status: DISCONTINUED | OUTPATIENT
Start: 2022-10-01 | End: 2022-10-03 | Stop reason: HOSPADM

## 2022-10-01 RX ORDER — AZATHIOPRINE 50 MG/1
50 TABLET ORAL DAILY
Status: DISCONTINUED | OUTPATIENT
Start: 2022-10-02 | End: 2022-10-03 | Stop reason: HOSPADM

## 2022-10-01 RX ORDER — MIDODRINE HYDROCHLORIDE 5 MG/1
5 TABLET ORAL ONCE
Status: COMPLETED | OUTPATIENT
Start: 2022-10-01 | End: 2022-10-01

## 2022-10-01 RX ORDER — PANTOPRAZOLE SODIUM 40 MG/1
40 TABLET, DELAYED RELEASE ORAL DAILY
COMMUNITY

## 2022-10-01 RX ORDER — ONDANSETRON 2 MG/ML
4 INJECTION INTRAMUSCULAR; INTRAVENOUS ONCE
Status: COMPLETED | OUTPATIENT
Start: 2022-10-01 | End: 2022-10-01

## 2022-10-01 RX ORDER — TACROLIMUS 1 MG/1
1 CAPSULE ORAL EVERY 12 HOURS SCHEDULED
Status: DISCONTINUED | OUTPATIENT
Start: 2022-10-01 | End: 2022-10-03 | Stop reason: HOSPADM

## 2022-10-01 RX ORDER — SEVELAMER CARBONATE 800 MG/1
800 TABLET, FILM COATED ORAL AS NEEDED
COMMUNITY

## 2022-10-01 RX ORDER — METRONIDAZOLE 500 MG/1
500 TABLET ORAL ONCE
Status: DISCONTINUED | OUTPATIENT
Start: 2022-10-01 | End: 2022-10-01

## 2022-10-01 RX ORDER — ASPIRIN 81 MG/1
81 TABLET, CHEWABLE ORAL EVERY MORNING
Status: DISCONTINUED | OUTPATIENT
Start: 2022-10-02 | End: 2022-10-03 | Stop reason: HOSPADM

## 2022-10-01 RX ORDER — MAGNESIUM HYDROXIDE/ALUMINUM HYDROXICE/SIMETHICONE 120; 1200; 1200 MG/30ML; MG/30ML; MG/30ML
30 SUSPENSION ORAL EVERY 6 HOURS PRN
Status: DISCONTINUED | OUTPATIENT
Start: 2022-10-01 | End: 2022-10-03 | Stop reason: HOSPADM

## 2022-10-01 RX ORDER — CEFEPIME HYDROCHLORIDE 2 G/50ML
2000 INJECTION, SOLUTION INTRAVENOUS ONCE
Status: COMPLETED | OUTPATIENT
Start: 2022-10-01 | End: 2022-10-01

## 2022-10-01 RX ORDER — MIDODRINE HYDROCHLORIDE 5 MG/1
5 TABLET ORAL 3 TIMES DAILY PRN
COMMUNITY

## 2022-10-01 RX ADMIN — CEFEPIME HYDROCHLORIDE 2000 MG: 2 INJECTION, SOLUTION INTRAVENOUS at 15:51

## 2022-10-01 RX ADMIN — VANCOMYCIN HYDROCHLORIDE 750 MG: 5 INJECTION, POWDER, LYOPHILIZED, FOR SOLUTION INTRAVENOUS at 16:22

## 2022-10-01 RX ADMIN — ONDANSETRON HYDROCHLORIDE 4 MG: 2 INJECTION, SOLUTION INTRAVENOUS at 17:53

## 2022-10-01 RX ADMIN — MIDODRINE HYDROCHLORIDE 5 MG: 5 TABLET ORAL at 19:56

## 2022-10-01 RX ADMIN — METRONIDAZOLE 500 MG: 500 INJECTION, SOLUTION INTRAVENOUS at 18:53

## 2022-10-01 RX ADMIN — SODIUM CHLORIDE 500 ML: 0.9 INJECTION, SOLUTION INTRAVENOUS at 17:05

## 2022-10-01 RX ADMIN — SODIUM CHLORIDE 250 ML: 0.9 INJECTION, SOLUTION INTRAVENOUS at 19:31

## 2022-10-01 RX ADMIN — SODIUM CHLORIDE 250 ML: 0.9 INJECTION, SOLUTION INTRAVENOUS at 16:47

## 2022-10-01 RX ADMIN — SODIUM CHLORIDE 250 ML: 0.9 INJECTION, SOLUTION INTRAVENOUS at 23:50

## 2022-10-01 RX ADMIN — ONDANSETRON 4 MG: 2 INJECTION INTRAMUSCULAR; INTRAVENOUS at 15:25

## 2022-10-01 NOTE — ED NOTES
While performing [employer] glugose on pt , pt became unresponvise on vomited large amount of undigested food   Dr Leslie Rubio aware and at bedside at this time     Ty Wood RN  10/01/22 2335

## 2022-10-01 NOTE — ASSESSMENT & PLAN NOTE
Patient's symptoms began today suddenly, after hemodialysis as well as eating out at a [employer], 1 other sick contact related to [place] anfix was reported      Possible viral gastroenteritis, will check acute hepatitis panel/hepatitis a    Check stool cultures, check C diff

## 2022-10-01 NOTE — ED NOTES
Patient was trialed off the non-rebreather but quickly desated to 84%   15L nonrebreather placed back on patient and saturations returned to low 90's     Leo John RN  10/01/22 8945

## 2022-10-01 NOTE — RESPIRATORY THERAPY NOTE
RT Protocol Note  Marcus Villalobos 45 y o  male MRN: [id number]  Unit/Bed#: 407-01 Encounter: [id number]    Assessment    Principal Problem:    Acute respiratory failure with hypoxia ([facility] )  Active Problems:    Type 1 diabetes mellitus ([facility] )    Renal transplant, status post    Immunosuppression (HCC)    Diarrhea    S/P AKA (above knee amputation), right (HCC)    Gastroenteritis    Vasovagal syncope      Home Pulmonary Medications:  Patient does not use any home respiratory medications, no home oxygen or pap therapy       Past Medical History:   Diagnosis Date    Bacteremia 12/21/2018    CAD (coronary artery disease)     s/p MARC to LCx, pLAD 2/2018    Cardiac arrest ([place] [address] )     Chronic kidney disease     Diabetes mellitus type 1 ([place] [address] )     Dialysis patient ([place] [address] )     Access in right chest    GI bleed     Hyperlipidemia     Hypertension     Infection at site of external fixator pin ([place] [address] )     MI (myocardial infarction) ([place] [address] )     Pneumonia     Last Assessed [id number]    Renal failure     Renal transplant, status post 07/21/2007     Social History     Socioeconomic History    Marital status: /Civil Union     Spouse name: None    Number of children: None    Years of education: None    Highest education level: None   Occupational History    None   Tobacco Use    Smoking status: Never Smoker    Smokeless tobacco: Never Used   Vaping Use    Vaping Use: Never used   Substance and Sexual Activity    Alcohol use: Not Currently     Alcohol/week: 0 0 standard drinks    Drug use: Never    Sexual activity: Yes     Partners: Female   Other Topics Concern    None   Social History Narrative    No living will     Social Determinants of Health     Financial Resource Strain: Not on file   Food Insecurity: Not on file   Transportation Needs: Not on file   Physical Activity: Not on file   Stress: Not on file   Social Connections: Not on file   Intimate Partner Violence: Not on file   Housing Stability: Not on file       Subjective    Subjective Data: Patient is responding, but groggy    Objective    Physical Exam:   Assessment Type: During-treatment  General Appearance: Drowsy  Chest Assessment: Chest expansion symmetrical, Trachea midline  Bilateral Breath Sounds: Rhonchi  O2 Device: Vapotherm    Vitals:  Blood pressure (!) 95/48, pulse 83, temperature 98 9 °F (37 2 °C), temperature source Oral, resp  rate 20, weight 52 4 kg (115 lb 8 3 oz), SpO2 94 %  Imaging and other studies: I have personally reviewed pertinent reports  O2 Device: Vapotherm     Plan    Respiratory Plan: Vent/NIV/HFNC  Airway Clearance Plan: Incentive Spirometer     Resp Comments: Patient received in the ED on Vapotherm, patient transported up to the ICU without incident  Patient does not use any pap therapy, no oxygen therapy, he does not have any home respiratory medications  Patient did aspirate, we will have to give patient appropriate bronchial hygiene  Incentive spirometer to be used Q1H to help with bronchial hygiene  Patient is on Vapotherm, this will also help with bronchial hygiene         Incentive spirometer instructed and educated, patient does not seem to receptive to the therapy at this time, his wife is at bedside and stated she will help to encourage patient to use

## 2022-10-01 NOTE — ASSESSMENT & PLAN NOTE
Lab Results   Component Value Date    HGBA1C 7 4 (H) 08/21/2022       Recent Labs     10/01/22  1511   POCGLU 228*       Blood Sugar Average: Last 72 hrs:  (P) 228     Continue Lantus 10 units twice daily, monitor Accu-Cheks    Low threshold to initiate continuous insulin infusion

## 2022-10-01 NOTE — ASSESSMENT & PLAN NOTE
Patient had several episodes of vasovagal syncope today, short episodes, no evidence of seizure activity, no postictal     Treat underlying process as above, suspect combination of acute infection, volume depletion

## 2022-10-01 NOTE — ASSESSMENT & PLAN NOTE
Patient with acute respiratory failure with hypoxia, required non-rebreather in the emergency room to maintain sats greater than 90%, patient also requiring Vapotherm currently, admit to step-down level 1, patient had aspiration event, aspiration pneumonitis versus aspiration pneumonia, will give broad-spectrum antibiotics    Follow-up cultures, wean oxygen as tolerated    Trend procalcitonin

## 2022-10-01 NOTE — H&P
[address] 1970, 45 y o  male MRN: [id number]  Unit/Bed#: KRYSTIAN Encounter: [id number]  Primary Care Provider: Shereen Negrete DO   Date and time admitted to hospital: 10/1/2022  2:55 PM    * Acute respiratory failure with hypoxia [facility])  Assessment & Plan  Patient with acute respiratory failure with hypoxia, required non-rebreather in the emergency room to maintain sats greater than 90%, patient also requiring Vapotherm currently, admit to step-down level 1, patient had aspiration event, aspiration pneumonitis versus aspiration pneumonia, will give broad-spectrum antibiotics    Follow-up cultures, wean oxygen as tolerated    Trend procalcitonin    Gastroenteritis  Assessment & Plan  Patient's symptoms began today suddenly, after hemodialysis as well as eating out at a [employer] donut, 1 other sick contact related to [place] donut was reported  Possible viral gastroenteritis, will check acute hepatitis panel/hepatitis a    Check stool cultures, check C diff    Diarrhea  Assessment & Plan  Check stool studies as above    Type 1 diabetes mellitus ([facility] )  Assessment & Plan  Lab Results   Component Value Date    HGBA1C 7 4 (H) 08/21/2022       Recent Labs     10/01/22  1511   POCGLU 228*       Blood Sugar Average: Last 72 hrs:  (P) 228     Continue Lantus 10 units twice daily, monitor Accu-Cheks    Low threshold to initiate continuous insulin infusion    Chronic anemia  Assessment & Plan  Hemoglobin stable    Vasovagal syncope  Assessment & Plan  Patient had several episodes of vasovagal syncope today, short episodes, no evidence of seizure activity, no postictal     Treat underlying process as above, suspect combination of acute infection, volume depletion      Renal transplant, status post  Assessment & Plan  History of renal transplant, now has end-stage renal disease due to failed renal transplant is on hemodialysis Tuesday Thursday Saturday, tolerated hemodialysis today 10/1/2022        Immunosuppression (HCC)  Assessment & Plan  Chronic immunosuppression, continue broad-spectrum antibiotics pending cultures      VTE Pharmacologic Prophylaxis: VTE Score: 2 Low Risk (Score 0-2) - Encourage Ambulation  Code Status:  Full code  Discussion with family: Updated  (significant other) at bedside  Anticipated Length of Stay: Patient will be admitted on an inpatient basis with an anticipated length of stay of greater than 2 midnights secondary to Acute respiratory failure with hypoxia  Total Time for Visit, including Counseling / Coordination of Care: 90 minutes Greater than 50% of this total time spent on direct patient counseling and coordination of care  Chief Complaint:  Shortness of breath    History of Present Illness:  Toni Galloway is a 45 y o  male with a PMH of diabetes mellitus type 1, end-stage renal disease on hemodialysis who presents with sudden onset of illness today  Patient's symptoms started with a GI illness, nausea vomiting and diarrhea reported, patient then felt hypoglycemic, received a dose of glucagon with mild improvement  Patient continued to experience worsening symptoms ongoing nausea and vomiting, associated with fainting episode  There was response reported aspiration, now with large amount of mucopurulent sputum production  Patient was in his usual state of health yesterday, went to hemodialysis session this a m  From approximately 06:00 to 10:00, without incident, of note patient recently ate at a [employer] don[employer], had a drink and a donut  Review of Systems:  Review of Systems   All other systems reviewed and are negative        Past Medical and Surgical History:   Past Medical History:   Diagnosis Date    Bacteremia 12/21/2018    CAD (coronary artery disease)     s/p MARC to LCx, pLAD 2/2018    Cardiac arrest ([facility] )     Chronic kidney disease     Diabetes mellitus type 1 ([facility] )     Dialysis patient ([facility] )     Access in right chest    GI bleed     Hyperlipidemia     Hypertension     Infection at site of external fixator pin ([facility] )     MI (myocardial infarction) ([facility] )     Pneumonia     Last Assessed [id number]    Renal failure     Renal transplant, status post 07/21/2007       Past Surgical History:   Procedure Laterality Date    AMPUTATION Right 02/2019    aboce knee    ANKLE FRACTURE SURGERY      ANKLE HARDWARE REMOVAL Right 7/31/2017    Procedure: REMOVAL HARDWARE ANKLE;  Surgeon: Emmie Medeiros MD;  Location: [facility];  Service: Orthopedics    ANKLE HARDWARE REMOVAL Right 8/17/2017    Procedure: TIBIA FAILED HARDWARE REMOVAL;  Surgeon: Emmie Medeiros MD;  Location: [facility];  Service: Orthopedics    CLOSED REDUCTION ANKLE Right 7/3/2017    Procedure: CLOSED REDUCTION DISTAL TIB-FIB AND CASTING VS;  Surgeon: Emmie Medeiros MD;  Location: [facility];  Service: Orthopedics    CORONARY ANGIOPLASTY WITH STENT PLACEMENT  02/2018    CAD s/p MARC to LCx, pLAD    ESOPHAGOGASTRODUODENOSCOPY N/A 12/20/2018    Procedure: ESOPHAGOGASTRODUODENOSCOPY (EGD) in ICU; Surgeon: Rowan Machado MD;  Location: [facility];   Service: Gastroenterology    EYE SURGERY      FRACTURE SURGERY      ORIF Rt Ankle    GLUTAMIC ACID DECARBOXYLASE (HISTORICAL)      IR AV FISTULAGRAM/GRAFTOGRAM  4/16/2020    IR PICC LINE  8/20/2018    IR TUNNELED DIALYSIS CATHETER CHECK/CHANGE/REPOSITION/ANGIOPLASTY  1/8/2020    IR TUNNELED DIALYSIS CATHETER PLACEMENT  10/21/2019    IR TUNNELED DIALYSIS CATHETER REMOVAL  5/29/2020    IR VENOUS LINE REMOVAL  10/5/2018    LEG AMPUTATION Right 02/01/2019    Above the knee    NEPHRECTOMY TRANSPLANTED ORGAN      [place] ANASTOMOSIS,AV,ANY SITE Right 2/11/2020    Procedure: CREATION FISTULA ARTERIOVENOUS (AV) right upper extremity;  Surgeon: Manuel Ennis MD;  Location: [address] [place] [address] MAIN OR;  Service: Vascular    [place] OPEN TREATMENT FRACTURE DISTAL TIBIA FIBULA Right 7/3/2017    Procedure: OPEN REDUCTION W/ INTERNAL FIXATION (ORIF); Surgeon: Esperanza Lu MD;  Location: [facility];  Service: Orthopedics    TOE AMPUTATION Right 10/27/2016    Procedure: AMPUTATION TOE;  Surgeon: Kaylah Fleming DPM;  Location: [facility];  Service:        Meds/Allergies:  Prior to Admission medications    Medication Sig Start Date End Date Taking?  Authorizing Provider   aspirin 81 mg chewable tablet Chew 81 mg every morning     Historical Provider, MD   atorvastatin (LIPITOR) 80 mg tablet Take 1 tablet (80 mg total) by mouth every morning 10/12/21   GAVINO Guzmán   azaTHIOprine (IMURAN) 50 mg tablet Take 1 tablet (50 mg total) by mouth daily 6/21/22   GAVINO Guzmán   B Complex-C-Folic Acid (Nela-Juan Jose) TABS Apply 1 tablet to the mouth or throat in the morning  Patient not taking: Reported on 9/30/2022 5/5/22   GAVINO Guzmán   carvedilol (COREG) 6 25 mg tablet Take 1 tablet (6 25 mg total) by mouth 2 (two) times a day with meals 7/5/22   GAVINO Guzmán   cinacalcet (SENSIPAR) 30 mg tablet Take 1 tablet (30 mg total) by mouth daily  Patient not taking: Reported on 9/30/2022 3/7/22   GAVINO Guzmán   glucose blood test strip Use as instructed  Patient not taking: Reported on 9/30/2022 9/17/21   Ángela Cummins DO   insulin aspart (NovoLOG) 100 units/mL injection Inject 10 Units under the skin 3 (three) times a day with meals 6/30/22   GAVINO Guzmán   insulin glargine (Semglee) 100 units/mL subcutaneous injection Inject 10 Units under the skin every 12 (twelve) hours 5/3/22   GAVINO Guzmán   Insulin Syringe-Needle U-100 31G X 15/64" 0 5 ML MISC Use 3 (three) times a day 3/10/22   GAVINO Guzmán   isosorbide mononitrate (IMDUR) 30 mg 24 hr tablet Take 1 tablet (30 mg total) by mouth daily 2/10/22   Andrzej Gonzales PA-C   midodrine (PROAMATINE) 5 mg tablet Take 1 tablet (5 mg total) by mouth 3 (three) times a week Take before dialysis  Patient not taking: No sig reported 2/2/22   GAVINO Guzmán   NIFEdipine (PROCARDIA XL) 30 mg 24 hr tablet Take 1 tablet (30 mg total) by mouth daily 1/11/22   GAVINO Quiroz   pantoprazole (PROTONIX) 40 mg tablet Take 1 tablet (40 mg total) by mouth in the morning  Patient not taking: Reported on 9/30/2022 5/17/22   GAVINO Quiroz   predniSONE 5 mg tablet Take 1 tablet (5 mg total) by mouth daily 10/12/21   GAVINO Quiroz   sevelamer carbonate (RENVELA) 800 mg tablet Take 1 tablet (800 mg total) by mouth 3 (three) times a day with meals And 1 tab with snacks and beverages  Patient not taking: Reported on 9/30/2022 7/5/22   Rahul Ashton DO   tacrolimus (PROGRAF) 1 mg capsule TAKE 2 CAPSULES BY MOUTH EVERY MORNING AND ONE CAPSULE EVERY DAY AT BEDTIME 6/28/22   GAVINO Quiroz     I have reviewed home medications using recent Epic encounter  Allergies: No Known Allergies    Social History:  Marital Status: /Civil Union   Occupation:    Substance Use History:   Social History     Substance and Sexual Activity   Alcohol Use Not Currently    Alcohol/week: 0 0 standard drinks     Social History     Tobacco Use   Smoking Status Never Smoker   Smokeless Tobacco Never Used     Social History     Substance and Sexual Activity   Drug Use Never       Family History:  Family History   Problem Relation Age of Onset    Diabetes Brother     Coronary artery disease Mother     No Known Problems Father        Physical Exam:     Vitals:   Blood Pressure: (!) 78/42 (10/01/22 1845)  Pulse: 74 (10/01/22 1845)  Temperature: 97 6 °F (36 4 °C) (10/01/22 1500)  Respirations: 20 (10/01/22 1845)  Weight - Scale: 52 4 kg (115 lb 8 3 oz) (10/01/22 1500)  SpO2: 95 % (10/01/22 1845)    Physical Exam  Vitals and nursing note reviewed  Constitutional:       General: He is not in acute distress  Appearance: He is ill-appearing  He is not toxic-appearing or diaphoretic  HENT:      Head: Normocephalic and atraumatic        Right Ear: External ear normal       Left Ear: External ear normal    Cardiovascular:      Rate and Rhythm: Normal rate  Pulses: Normal pulses  Pulmonary:      Effort: Pulmonary effort is normal    Skin:     General: Skin is warm and dry  Neurological:      Mental Status: He is oriented to person, place, and time  Mental status is at baseline  Cranial Nerves: No cranial nerve deficit  Psychiatric:         Mood and Affect: Mood normal          Behavior: Behavior normal          Thought Content: Thought content normal          Judgment: Judgment normal           Additional Data:     Lab Results:  Results from last 7 days   Lab Units 10/01/22  1523   WBC Thousand/uL 18 29*   HEMOGLOBIN g/dL 12 4   HEMATOCRIT % 36 6   PLATELETS Thousands/uL 271   NEUTROS PCT % 81*   LYMPHS PCT % 9*   MONOS PCT % 7   EOS PCT % 2     Results from last 7 days   Lab Units 10/01/22  1523   SODIUM mmol/L 134*   POTASSIUM mmol/L 3 3*   CHLORIDE mmol/L 94*   CO2 mmol/L 28   BUN mg/dL 30*   CREATININE mg/dL 4 18*   ANION GAP mmol/L 12   CALCIUM mg/dL 9 7   ALBUMIN g/dL 4 0   TOTAL BILIRUBIN mg/dL 0 61   ALK PHOS U/L 131*   ALT U/L 20   AST U/L 8   GLUCOSE RANDOM mg/dL 213*     Results from last 7 days   Lab Units 10/01/22  1523   INR  0 92     Results from last 7 days   Lab Units 10/01/22  1511   POC GLUCOSE mg/dl 228*         Results from last 7 days   Lab Units 10/01/22  1523   LACTIC ACID mmol/L 1 2   PROCALCITONIN ng/ml 0 77*       Imaging: Reviewed radiology reports from this admission including: chest CT scan and abdominal/pelvic CT  CT chest abdomen pelvis wo contrast   Final Result by Marizol Cast MD (10/01 1757)      There is bilateral central, posterior lower lobe patchy airspace consolidation, consistent with aspiration (series 2 images 24-43 )      Fluid-filled large bowel, consistent with diarrheal illness              Workstation performed: [id number]         CT head without contrast   Final Result by Marizol Cast MD (10/01 1751)      No acute intracranial abnormality  Workstation performed: [id number]         XR chest 1 view portable   ED Interpretation by Vinicio Alexis MD (10/01 1640)   Bilateral infiltrates concerning for multifocal pneumonia          EKG and Other Studies Reviewed on Admission:   · EKG: Sinus Bradycardia  HR Thirty-seven  ** Please Note: This note has been constructed using a voice recognition system   **

## 2022-10-01 NOTE — RESPIRATORY THERAPY NOTE
1815 called to ER1  to place pt on vapotherm    10/01/22 1815   Respiratory Assessment   Resp Comments called to ER  to place pt on vapotherm to keep pt off NRB   Non-Invasive Information   O2 Interface Device HFNC prongs   Non-Invasive Ventilation Mode HFNC (High flow)   SpO2 96 %   $ Pulse Oximetry Spot Check Charge Completed   Non-Invasive Settings   Flow (lpm) 30   FiO2 (%) 50   Temperature (Set) 33   Non-Invasive Readings   Heater Temperature (Obs) 33

## 2022-10-01 NOTE — PLAN OF CARE
Problem: Potential for Falls  Goal: Patient will remain free of falls  Description: INTERVENTIONS:  - Educate patient/family on patient safety including physical limitations  - Instruct patient to call for assistance with activity   - Consult OT/PT to assist with strengthening/mobility   - Keep Call bell within reach  - Keep bed low and locked with side rails adjusted as appropriate  - Keep care items and personal belongings within reach  - Initiate and maintain comfort rounds  - Make Fall Risk Sign visible to staff  - Offer Toileting every 2 Hours, in advance of need  - Initiate/Maintain bed/chair alarm  - Obtain necessary fall risk management equipment: alarms  - Apply yellow socks and bracelet for high fall risk patients  - Consider moving patient to room near nurses station  Outcome: Progressing     Problem: PAIN - ADULT  Goal: Verbalizes/displays adequate comfort level or baseline comfort level  Description: Interventions:  - Encourage patient to monitor pain and request assistance  - Assess pain using appropriate pain scale  - Administer analgesics based on type and severity of pain and evaluate response  - Implement non-pharmacological measures as appropriate and evaluate response  - Consider cultural and social influences on pain and pain management  - Notify physician/advanced practitioner if interventions unsuccessful or patient reports new pain  Outcome: Progressing     Problem: INFECTION - ADULT  Goal: Absence or prevention of progression during hospitalization  Description: INTERVENTIONS:  - Assess and monitor for signs and symptoms of infection  - Monitor lab/diagnostic results  - Monitor all insertion sites, i e  indwelling lines, tubes, and drains  - Monitor endotracheal if appropriate and nasal secretions for changes in amount and color  - [place] appropriate cooling/warming therapies per order  - Administer medications as ordered  - Instruct and encourage patient and family to use good hand hygiene technique  - Identify and instruct in appropriate isolation precautions for identified infection/condition  Outcome: Progressing  Goal: Absence of fever/infection during neutropenic period  Description: INTERVENTIONS:  - Monitor WBC    Outcome: Progressing     Problem: SAFETY ADULT  Goal: Patient will remain free of falls  Description: INTERVENTIONS:  - Educate patient/family on patient safety including physical limitations  - Instruct patient to call for assistance with activity   - Consult OT/PT to assist with strengthening/mobility   - Keep Call bell within reach  - Keep bed low and locked with side rails adjusted as appropriate  - Keep care items and personal belongings within reach  - Initiate and maintain comfort rounds  - Make Fall Risk Sign visible to staff  - Offer Toileting every 2 Hours, in advance of need  - Initiate/Maintain bed/chair alarm  - Obtain necessary fall risk management equipment: alarms  - Apply yellow socks and bracelet for high fall risk patients  - Consider moving patient to room near nurses station  Outcome: Progressing  Goal: Maintain or return to baseline ADL function  Description: INTERVENTIONS:  -  Assess patient's ability to carry out ADLs; assess patient's baseline for ADL function and identify physical deficits which impact ability to perform ADLs (bathing, care of mouth/teeth, toileting, grooming, dressing, etc )  - Assess/evaluate cause of self-care deficits   - Assess range of motion  - Assess patient's mobility; develop plan if impaired  - Assess patient's need for assistive devices and provide as appropriate  - Encourage maximum independence but intervene and supervise when necessary  - Involve family in performance of ADLs  - Assess for home care needs following discharge   - Consider OT consult to assist with ADL evaluation and planning for discharge  - Provide patient education as appropriate  Outcome: Progressing  Goal: Maintains/Returns to pre admission functional level  Description: INTERVENTIONS:  - Perform BMAT or MOVE assessment daily    - Set and communicate daily mobility goal to care team and patient/family/caregiver  - Collaborate with rehabilitation services on mobility goals if consulted  - Perform Range of Motion 3 times a day  - Reposition patient every 2 hours  - Dangle patient 3 times a day  - Stand patient 4 times a day  - Ambulate patient 4 times a day  - Out of bed to chair 4 times a day   - Out of bed for meals 3 times a day  - Out of bed for toileting  - Record patient progress and toleration of activity level   Outcome: Progressing     Problem: Knowledge Deficit  Goal: Patient/family/caregiver demonstrates understanding of disease process, treatment plan, medications, and discharge instructions  Description: Complete learning assessment and assess knowledge base    Interventions:  - Provide teaching at level of understanding  - Provide teaching via preferred learning methods  Outcome: Progressing     Problem: NEUROSENSORY - ADULT  Goal: Achieves stable or improved neurological status  Description: INTERVENTIONS  - Monitor and report changes in neurological status  - Monitor vital signs such as temperature, blood pressure, glucose, and any other labs ordered   - Initiate measures to prevent increased intracranial pressure  - Monitor for seizure activity and implement precautions if appropriate      Outcome: Progressing     Problem: CARDIOVASCULAR - ADULT  Goal: Maintains optimal cardiac output and hemodynamic stability  Description: INTERVENTIONS:  - Monitor I/O, vital signs and rhythm  - Monitor for S/S and trends of decreased cardiac output  - Administer and titrate ordered vasoactive medications to optimize hemodynamic stability  - Assess quality of pulses, skin color and temperature  - Assess for signs of decreased coronary artery perfusion  - Instruct patient to report change in severity of symptoms  Outcome: Progressing  Goal: Absence of cardiac dysrhythmias or at baseline rhythm  Description: INTERVENTIONS:  - Continuous cardiac monitoring, vital signs, obtain 12 lead EKG if ordered  - Administer antiarrhythmic and heart rate control medications as ordered  - Monitor electrolytes and administer replacement therapy as ordered  Outcome: Progressing     Problem: RESPIRATORY - ADULT  Goal: Achieves optimal ventilation and oxygenation  Description: INTERVENTIONS:  - Assess for changes in respiratory status  - Assess for changes in mentation and behavior  - Position to facilitate oxygenation and minimize respiratory effort  - Oxygen administered by appropriate delivery if ordered  - Initiate smoking cessation education as indicated  - Encourage broncho-pulmonary hygiene including cough, deep breathe, Incentive Spirometry  - Assess the need for suctioning and aspirate as needed  - Assess and instruct to report SOB or any respiratory difficulty  - Respiratory Therapy support as indicated  Outcome: Progressing     Problem: GASTROINTESTINAL - ADULT  Goal: Minimal or absence of nausea and/or vomiting  Description: INTERVENTIONS:  - Administer IV fluids if ordered to ensure adequate hydration  - Maintain NPO status until nausea and vomiting are resolved  - Nasogastric tube if ordered  - Administer ordered antiemetic medications as needed  - Provide nonpharmacologic comfort measures as appropriate  - Advance diet as tolerated, if ordered  - Consider nutrition services referral to assist patient with adequate nutrition and appropriate food choices  Outcome: Progressing  Goal: Oral mucous membranes remain intact  Description: INTERVENTIONS  - Assess oral mucosa and hygiene practices  - Implement preventative oral hygiene regimen  - Implement oral medicated treatments as ordered  - Initiate Nutrition services referral as needed  Outcome: Progressing     Problem: METABOLIC, FLUID AND ELECTROLYTES - ADULT  Goal: Electrolytes maintained within normal limits  Description: INTERVENTIONS:  - Monitor labs and assess patient for signs and symptoms of electrolyte imbalances  - Administer electrolyte replacement as ordered  - Monitor response to electrolyte replacements, including repeat lab results as appropriate  - Instruct patient on fluid and nutrition as appropriate  Outcome: Progressing  Goal: Fluid balance maintained  Description: INTERVENTIONS:  - Monitor labs   - Monitor I/O and WT  - Instruct patient on fluid and nutrition as appropriate  - Assess for signs & symptoms of volume excess or deficit  Outcome: Progressing  Goal: Glucose maintained within target range  Description: INTERVENTIONS:  - Monitor Blood Glucose as ordered  - Assess for signs and symptoms of hyperglycemia and hypoglycemia  - Administer ordered medications to maintain glucose within target range  - Assess nutritional intake and initiate nutrition service referral as needed  Outcome: Progressing     Problem: SKIN/TISSUE INTEGRITY - ADULT  Goal: Skin Integrity remains intact(Skin Breakdown Prevention)  Description: Assess:  -Perform Frank assessment every shift  -Clean and moisturize skin every 4 hours  -Inspect skin when repositioning, toileting, and assisting with ADLS  -Assess under medical devices such as pulse every shift  -Assess extremities for adequate circulation and sensation     Bed Management:  -Have minimal linens on bed & keep smooth, unwrinkled  -Change linens as needed when moist or perspiring  -Avoid sitting or lying in one position for more than 2 hours while in bed  -Keep HOB at 30 degrees     Toileting:  -Offer bedside commode  -Assess for incontinence every 2  -Use incontinent care products after each incontinent episode such as breifs    Activity:  -Mobilize patient 4 times a day  -Encourage activity and walks on unit  -Encourage or provide ROM exercises   -Turn and reposition patient every 2 Hours  -Use appropriate equipment to lift or move patient in bed  -Instruct/ Assist with weight shifting every 2 when out of bed in chair  -Consider limitation of chair time 4 hour intervals    Skin Care:  -Avoid use of baby powder, tape, friction and shearing, hot water or constrictive clothing  -Relieve pressure over bony prominences using alevyn  -Do not massage red bony areas    Next Steps:  -Teach patient strategies to minimize risks such as weight shift  Outcome: Progressing

## 2022-10-01 NOTE — ED PROVIDER NOTES
EMERGENCY DEPARTMENT ENCOUNTER NOTE    This note has been generated using a voice recognition software  There may be typographic, grammatic, or word substitution errors that have escaped editorial review  Emergency Department Note- Sheri Zayas 45 y o  male MRN: [id number]    Unit/Bed#: Yareli Henry Encounter: [id number]  ? CHIEF COMPLAINT  Chief Complaint   Patient presents with    Shortness of Breath     Pt sob diarrhea and felt like his sugar was low at home  Wife gave him glucagon  feeling somewhat better now       HPI  Sheri Zayas is a 45 y o  male with PMH of coronary artery disease, CHF, pulmonary hypertension, ESRD with prior kidney transplant that has unfortunately failed, on hemodialysis Tuesday, Thursday, Saturday, last hemodialysis today, presenting with nausea, vomiting, diarrhea, and an episode of unresponsiveness  About 2 hours ago, patient developed profuse loose watery bowel movements  He felt like his blood glucose was low and his wife administered glucagon  He did have an episode of emesis and episode of unresponsiveness  Upon arrival to the emergency department, I entered the room to find patient being propped forward by his wife and profusely vomiting, shaking upper and lower extremities in rhythmic activity and unresponsive  His pulse ox dropped down to 82% with good pleth, O2 via NC followed by NRB applied  Patient did regain consciousness almost right away, no postictal period, he was able to state he feels tired, nauseated, and has diarrhea  He denies any chest pain  He denies any shortness of breath despite hypoxia  He is able to state his name and knows where he is  Patient had follow-up with his cardiologist yesterday  History of present illness and review of systems obtained from patient and wife  REVIEW OF SYSTEMS    Constitutional: denies fevers, chills  Visual/Eyes: no changes in vision  HENT: no rhinorrhea, no sore throat  Cardiac: no chest pain    History of right AKA  Respiratory: no shortness of breath, no cough  GI:  No abdominal pain  Patient does have nausea and loose stools that started just today after hemodialysis  :  Rarely urinates  Heme/Onc: no easy bruising  Endocrine:  Type 1 diabetes mellitus  Neuro: no focal weakness or numbness  No history of seizures  Ten systems reviewed and negative unless otherwise noted in HPI and above    PAST MEDICAL HISTORY  Past Medical History:   Diagnosis Date    Bacteremia 12/21/2018    CAD (coronary artery disease)     s/p MARC to LCx, pLAD 2/2018    [facility]     Chronic kidney disease     [facility]     Dialysis patient [facility]     Access in right chest    GI bleed     Hyperlipidemia     Hypertension     [facility]fection [facility] fix[facility]     [facility] infarction[facility]     Pneumonia     Last Assessed [id number]    Renal failure     Renal transplant, status post 07/21/2007       SURGICAL HISTORY  Past Surgical History:   Procedure Laterality Date    AMPUTATION Right 02/2019    aboce knee    ANKLE FRACTURE SURGERY      ANKLE HARDWARE REMOVAL Right 7/31/2017    Procedure: REMOVAL HARDWARE ANKLE;  Surgeon: Delgado Pool MD;  [facility]: [facility];  Service: Orthopedics    ANKLE HARDWARE REMOVAL Right 8/17/2017    Procedure: TIBIA FAILED HARDWARE REMOVAL;  Surgeon: Delgado Pool MD;  [facility]: [facility];  Service: Orthopedics    CLOSED REDUCTION ANKLE Right 7/3/2017    Procedure: CLOSED REDUCTION DISTAL TIB-FIB AND CASTING VS;  Surgeon: Delgado Pool MD;  [facility]: [facility];  Service: Orthopedics    CORONARY ANGIOPLASTY WITH STENT PLACEMENT  02/2018    CAD s/p MARC to LCx, pLAD    ESOPHAGOGASTRODUODENOSCOPY N/A 12/20/2018    Procedure: ESOPHAGOGASTRODUODENOSCOPY (EGD) in ICU; Surgeon: Shahzad Gonzales MD;  Location: [facility];   Service: Gastroenterology    EYE SURGERY      FRACTURE SURGERY      ORIF Rt Ankle    GLUTAMIC ACID DECARBOXYLASE (HISTORICAL)      IR AV FISTULAGRAM/GRAFTOGRAM  4/16/2020    IR PICC LINE  8/20/2018    IR TUNNELED DIALYSIS CATHETER CHECK/CHANGE/REPOSITION/ANGIOPLASTY  1/8/2020    IR TUNNELED DIALYSIS CATHETER PLACEMENT  10/21/2019    IR TUNNELED DIALYSIS CATHETER REMOVAL  5/29/2020    IR VENOUS LINE REMOVAL  10/5/2018    LEG AMPUTATION Right 02/01/2019    Above the knee    NEPHRECTOMY TRANSPLANTED ORGAN      MS ANASTOMOSIS,AV,ANY SITE Right 2/11/2020    Procedure: CREATION FISTULA ARTERIOVENOUS (AV) right upper extremity;  Surgeon: Boy Crum MD;  Location: [address] [place] [address] MAIN OR;  Service: Vascular    MS OPEN TREATMENT FRACTURE DISTAL TIBIA FIBULA Right 7/3/2017    Procedure: OPEN REDUCTION W/ INTERNAL FIXATION (ORIF); Surgeon: Jamison Krabbe, MD;  Location: [facility];  Service: Orthopedics    TOE AMPUTATION Right 10/27/2016    Procedure: AMPUTATION TOE;  Surgeon: Neha Aponte DPM;  Location: [facility];  Service:        FAMILY HISTORY  Family History   Problem Relation Age of Onset    Diabetes Brother     Coronary artery disease Mother     No Known Problems Father         CURRENT MEDICATIONS  No current facility-administered medications on file prior to encounter       Current Outpatient Medications on File Prior to Encounter   Medication Sig    aspirin 81 mg chewable tablet Chew 81 mg every morning     atorvastatin (LIPITOR) 80 mg tablet Take 1 tablet (80 mg total) by mouth every morning    azaTHIOprine (IMURAN) 50 mg tablet Take 1 tablet (50 mg total) by mouth daily    B Complex-C-Folic Acid (Nela-Juan Jose) TABS Apply 1 tablet to the mouth or throat in the morning (Patient not taking: Reported on 9/30/2022)    carvedilol (COREG) 6 25 mg tablet Take 1 tablet (6 25 mg total) by mouth 2 (two) times a day with meals    cinacalcet (SENSIPAR) 30 mg tablet Take 1 tablet (30 mg total) by mouth daily (Patient not taking: Reported on 9/30/2022)    glucose blood test strip Use as instructed (Patient not taking: Reported on 9/30/2022)    insulin aspart (NovoLOG) 100 units/mL injection Inject 10 Units under the skin 3 (three) times a day with meals    insulin glargine (Semglee) 100 units/mL subcutaneous injection Inject 10 Units under the skin every 12 (twelve) hours    Insulin Syringe-Needle U-100 31G X 15/64" 0 5 ML MISC Use 3 (three) times a day    isosorbide mononitrate (IMDUR) 30 mg 24 hr tablet Take 1 tablet (30 mg total) by mouth daily    midodrine (PROAMATINE) 5 mg tablet Take 1 tablet (5 mg total) by mouth 3 (three) times a week Take before dialysis (Patient not taking: No sig reported)    NIFEdipine (PROCARDIA XL) 30 mg 24 hr tablet Take 1 tablet (30 mg total) by mouth daily    pantoprazole (PROTONIX) 40 mg tablet Take 1 tablet (40 mg total) by mouth in the morning   (Patient not taking: Reported on 9/30/2022)    predniSONE 5 mg tablet Take 1 tablet (5 mg total) by mouth daily    sevelamer carbonate (RENVELA) 800 mg tablet Take 1 tablet (800 mg total) by mouth 3 (three) times a day with meals And 1 tab with snacks and beverages (Patient not taking: Reported on 9/30/2022)    tacrolimus (PROGRAF) 1 mg capsule TAKE 2 CAPSULES BY MOUTH EVERY MORNING AND ONE CAPSULE EVERY DAY AT BEDTIME       ALLERGIES  No Known Allergies    SOCIAL HISTORY  Social History     Socioeconomic History    Marital status: /Civil Union     Spouse name: None    Number of children: None    Years of education: None    Highest education level: None   Occupational History    None   Tobacco Use    Smoking status: Never Smoker    Smokeless tobacco: Never Used   Vaping Use    Vaping Use: Never used   Substance and Sexual Activity    Alcohol use: Not Currently     Alcohol/week: 0 0 standard drinks    Drug use: Never    Sexual activity: Yes     Partners: Female   Other Topics Concern    None   Social History Narrative    No living will     Social Determinants of Health     Financial Resource Strain: Not on file   Food Insecurity: Not on file   Transportation Needs: Not on file   Physical Activity: Not on file   Stress: Not on file   Social Connections: Not on file   Intimate Partner Violence: Not on file   Housing Stability: Not on file       PHYSICAL EXAM    /58 (BP Location: Left arm)   Pulse 76   Temp 97 6 °F (36 4 °C)   Resp 18   Wt 52 4 kg (115 lb 8 3 oz)   SpO2 92%   BMI 19 83 kg/m²   Vital signs and nursing notes reviewed    CONSTITUTIONAL: male appearing stated age resting in bed, appears ill and toxic  HEENT: atraumatic, normocephalic  Sclera anicteric, conjunctiva are not injected  Moist oral mucosa  CARDIOVASCULAR/CHEST: RRR, no M/R/G  2+ radial pulses  PULMONARY:  Breathing comfortably on 6 L O2 via nasal cannula, rhonchi in bilateral bases  ABDOMEN: non-distended  BS present, normoactive  Non-tender  Healed surgical incision consistent with history of prior kidney transplant  MSK:  Right AKA  No edema in left lower extremity  NEURO: Awake, alert, and oriented x 3  Face symmetric  Moves all extremities spontaneously  No focal neurologic deficits  SKIN: Warm, appears well-perfused  MENTAL STATUS: Normal affect      ? LABS AND TESTS    Results Reviewed     Procedure Component Value Units Date/Time    HS Troponin I 2hr [[id number]] Collected: 10/01/22 1736    Lab Status:  In process Specimen: Blood from Arm, Left Updated: 10/01/22 1741    Blood gas, Venous [[id number]]  (Abnormal) Collected: 10/01/22 1523    Lab Status: Final result Specimen: Blood from Arm, Left Updated: 10/01/22 1647     pH, Mio 7 319     pCO2, Mio 56 0 mm Hg      pO2, Mio 20 1 mm Hg      HCO3, Mio 28 1 mmol/L      Base Excess, Mio 1 0 mmol/L      O2 Content, Mio 5 6 ml/dL      O2 HGB, VENOUS 31 1 %     FLU/RSV/COVID - if FLU/RSV clinically relevant [[id number]]  (Normal) Collected: 10/01/22 1523    Lab Status: Final result Specimen: Nares from Nose Updated: 10/01/22 1626     SARS-CoV-2 Negative     INFLUENZA A PCR Negative     INFLUENZA B PCR Negative RSV PCR Negative    Narrative:      FOR PEDIATRIC PATIENTS - copy/paste COVID Guidelines URL to browser: [email or web address]    SARS-CoV-2 assay is a Nucleic Acid Amplification assay intended for the  qualitative detection of nucleic acid from SARS-CoV-2 in nasopharyngeal  swabs  Results are for the presumptive identification of SARS-CoV-2 RNA  Positive results are indicative of infection with SARS-CoV-2, the virus  causing COVID-19, but do not rule out bacterial infection or co-infection  with other viruses  Laboratories within the [place] and its  territories are required to report all positive results to the appropriate  public health authorities  Negative results do not preclude SARS-CoV-2  infection and should not be used as the sole basis for treatment or other  patient management decisions  Negative results must be combined with  clinical observations, patient history, and epidemiological information  This test has not been FDA cleared or approved  This test has been authorized by FDA under an Emergency Use Authorization  (EUA)  This test is only authorized for the duration of time the  declaration that circumstances exist justifying the authorization of the  emergency use of an in vitro diagnostic tests for detection of SARS-CoV-2  virus and/or diagnosis of COVID-19 infection under section 564(b)(1) of  the Act, 21 U  [place] C  764YSZ-3(U)(4), unless the authorization is terminated  or revoked sooner  The test has been validated but independent review by FDA  and CLIA is pending  Test performed using [employer] GeneXpert: This RT-PCR assay targets N2,  a region unique to SARS-CoV-2  A conserved region in the E-gene was chosen  for pan-Sarbecovirus detection which includes SARS-CoV-2  According to CMS-2020-01-R, this platform meets the definition of high-[employer] technology      Tacrolimus level [[id number]] Collected: 10/01/22 [facility]    Lab Status: In process Specimen: Blood Updated: 10/01/22 1609    HS Troponin I 4hr [[id number]]     Lab Status: No result Specimen: Blood     HS Troponin 0hr (reflex protocol) [[id number]]  (Normal) Collected: 10/01/22 1523    Lab Status: Final result Specimen: Blood from Arm, Left Updated: 10/01/22 1602     hs TnI 0hr 15 ng/L     Procalcitonin [[id number]]  (Abnormal) Collected: 10/01/22 1523    Lab Status: Final result Specimen: Blood from Arm, Left Updated: 10/01/22 1555     Procalcitonin 0 77 ng/ml     Lactic acid [[id number]]  (Normal) Collected: 10/01/22 1523    Lab Status: Final result Specimen: Blood from Arm, Left Updated: 10/01/22 1548     LACTIC ACID 1 2 mmol/L     Narrative:      Result may be elevated if tourniquet was used during collection      Comprehensive metabolic panel [[id number]]  (Abnormal) Collected: 10/01/22 1523    Lab Status: Final result Specimen: Blood from Arm, Left Updated: 10/01/22 1545     Sodium 134 mmol/L      Potassium 3 3 mmol/L      Chloride 94 mmol/L      CO2 28 mmol/L      ANION GAP 12 mmol/L      BUN 30 mg/dL      Creatinine 4 18 mg/dL      Glucose 213 mg/dL      Calcium 9 7 mg/dL      AST 8 U/L      ALT 20 U/L      Alkaline Phosphatase 131 U/L      Total Protein 8 6 g/dL      Albumin 4 0 g/dL      Total Bilirubin 0 61 mg/dL      eGFR 15 ml/min/1 73sq m     Narrative:      National Kidney Disease Foundation guidelines for Chronic Kidney Disease (CKD):     Stage 1 with normal or high GFR (GFR > 90 mL/min/1 73 square meters)    Stage 2 Mild CKD (GFR = 60-89 mL/min/1 73 square meters)    Stage 3A Moderate CKD (GFR = 45-59 mL/min/1 73 square meters)    Stage 3B Moderate CKD (GFR = 30-44 mL/min/1 73 square meters)    Stage 4 Severe CKD (GFR = 15-29 mL/min/1 73 square meters)    Stage 5 End Stage CKD (GFR <15 mL/min/1 73 square meters)  Note: GFR calculation is accurate only with a steady state creatinine    Protime-INR [[id number]]  (Normal) Collected: 10/01/22 1523    Lab Status: Final result Specimen: Blood from Arm, Left Updated: 10/01/22 1542     Protime 12 5 seconds      INR 0 92    APTT [[id number]]  (Normal) Collected: 10/01/22 1523    Lab Status: Final result Specimen: Blood from Arm, Left Updated: 10/01/22 1542     PTT 32 seconds     CBC and differential [[id number]]  (Abnormal) Collected: 10/01/22 1523    Lab Status: Final result Specimen: Blood from Arm, Left Updated: 10/01/22 1532     WBC 18 29 Thousand/uL      RBC 3 54 Million/uL      Hemoglobin 12 4 g/dL      Hematocrit 36 6 %       fL      MCH 35 0 pg      MCHC 33 9 g/dL      RDW 13 7 %      MPV 8 5 fL      Platelets 683 Thousands/uL      nRBC 0 /100 WBCs      Neutrophils Relative 81 %      Immat GRANS % 0 %      Lymphocytes Relative 9 %      Monocytes Relative 7 %      Eosinophils Relative 2 %      Basophils Relative 1 %      Neutrophils Absolute 15 04 Thousands/µL      Immature Grans Absolute 0 08 Thousand/uL      Lymphocytes Absolute 1 60 Thousands/µL      Monocytes Absolute 1 21 Thousand/µL      Eosinophils Absolute 0 27 Thousand/µL      Basophils Absolute 0 09 Thousands/µL     Blood culture #1 [[id number]] Collected: 10/01/22 1523    Lab Status: In process Specimen: Blood from Hand, Left Updated: 10/01/22 1530    Blood culture #2 [[id number]] Collected: 10/01/22 1529    Lab Status: In process Specimen: Blood from Hand, Left Updated: 10/01/22 1530    UA w Reflex to Microscopic w Reflex to Culture [[id number]]     Lab Status: No result Specimen: Urine     Fingerstick Glucose (POCT) [[id number]]  (Abnormal) Collected: 10/01/22 1511    Lab Status: Final result Updated: 10/01/22 1519     POC Glucose 228 mg/dl           CT chest abdomen pelvis wo contrast   Final Result by Rohan Zeng MD (10/01 1757)      There is bilateral central, posterior lower lobe patchy airspace consolidation, consistent with aspiration (series 2 images 24-43 )      Fluid-filled large bowel, consistent with diarrheal illness              Workstation performed: [id number]         CT head without contrast   Final Result by Karmen Francisco MD (10/01 1751)      No acute intracranial abnormality  Workstation performed: [id number]         XR chest 1 view portable   ED Interpretation by Darci Louie MD (10/01 1640)   Bilateral infiltrates concerning for multifocal pneumonia          ED COURSE & MEDICAL DECISION MAKING  ECG 12 Lead Documentation Only    Date/Time: 10/1/2022 3:29 PM  Performed by: Darci Louie MD  Authorized by: Darci Louie MD     Comments:      Normal sinus rhythm, ventricular rate 60, OH interval 142, , , normal axis, there are T-wave inversions in high lateral lead aVL, there are T-waves in lead V2 suggestive of age-indeterminate septal infarct  No STEMI  Patient denies chest pain      CriticalCare Time  Performed by: Darci Louie MD  Authorized by: Darci Louie MD     Critical care provider statement:     Critical care time (minutes):  45    Critical care time was exclusive of:  Separately billable procedures and treating other patients and teaching time    Critical care was necessary to treat or prevent imminent or life-threatening deterioration of the following conditions:  Circulatory failure, dehydration, sepsis and respiratory failure    Critical care was time spent personally by me on the following activities:  Obtaining history from patient or surrogate, examination of patient, ordering and performing treatments and interventions, ordering and review of laboratory studies, ordering and review of radiographic studies, review of old charts, development of treatment plan with patient or surrogate, discussions with consultants, evaluation of patient's response to treatment and re-evaluation of patient's condition                 Medications   metroNIDAZOLE (FLAGYL) IVPB (premix) 500 mg 100 mL (has no administration in time range)   sodium chloride 0 9 % bolus 250 mL (250 mL Intravenous New Bag 10/1/22 1647)   ondansetron (ZOFRAN) injection 4 mg (4 mg Intravenous Given 10/1/22 1525)   cefepime (MAXIPIME) IVPB (premix in dextrose) 2,000 mg 50 mL (0 mg Intravenous Stopped 10/1/22 1622)   vancomycin 750 mg in sodium chloride 0 9% 250 mL (750 mg Intravenous New Bag 10/1/22 1622)   sodium chloride 0 9 % bolus 500 mL (500 mL Intravenous New Bag 10/1/22 1705)   ondansetron (ZOFRAN) injection 4 mg (4 mg Intravenous Given 10/1/22 163)     21-year-old male presenting with nausea, vomiting, loose stools, as well as now 2 episodes of unresponsiveness which appeared to be seizure-like with rhythmic activity of upper and lower extremities  Differential diagnosis is broad, including viral gastroenteritis with resultant nausea and vomiting, hypoglycemia with resultant seizure-like activity, hyponatremia, vagal nerve stimulation with profound bradycardia during vomiting with resultant seizure-like activity, brain mass, versus another etiology of symptoms  Point of care glucose is 228, ruling out hypoglycemia as the etiology of the 2nd unresponsive episode  Labs reveal leukocytosis, elevated procalcitonin (bacterial infection vs false elevation due to ESRD), mild hyponatremia and hypokalemia, HS trop 15 (no EKG changes, patient denies chest pain)  Covid-19/RSV/Influenza negative  CXR to my review is with infiltrates concerning for multifocal pneumonia, given HPI, concern for aspiration  Cefepime/Flagyl/Vanc IV started in anticipation of patient developing aspiration pneumonia  CT head without acute intracranial abnormality, CT chest/abdomen/pelvis is as above, consistent with aspiration in bilateral lungs as well as with diarrheal illness  Patient's BP did go down to 70s/40s, likely due to diarrhea-associated volume loss  A total of 750 mL NS bolus is being administered with improvement in BP   Patient is dialysis dependent and has a 1000 mL PO fluid restriction daily, will exercise caution with fluid resuscitation, however, at present, fluids are deemed to be appropriate  ED Course as of 10/01/22 1806   Sat Oct 01, 2022   1745 Patient was noted to lesley down to mid 30s on the monitor, sinus  When I entered the room, patient was reporting that he was about to throw up  He did proceed to have non-bloody, non-bilious emesis  Zofran administered  Bradycardia resolved  Patient felt improved  Repeat VS HR 92, /52    1756 CT head without contrast  No acute intracranial abnormality  1806 CT chest abdomen pelvis wo contrast  There is bilateral central, posterior lower lobe patchy airspace consolidation, consistent with aspiration (series 2 images 24-43 )     Fluid-filled large bowel, consistent with diarrheal illness         MDM  Number of Diagnoses or Management Options  Aspiration into airway, initial encounter: new and requires workup  Hypoxia: new and requires workup  Nausea and vomiting: new and requires workup  Unresponsive episode: new and requires workup     Amount and/or Complexity of Data Reviewed  Clinical lab tests: ordered and reviewed  Tests in the radiology section of CPT®: ordered and reviewed  Tests in the medicine section of CPT®: ordered and reviewed  Obtain history from someone other than the patient: yes (Wife, Marine Waterman)  Review and summarize past medical records: yes  Discuss the patient with other providers: yes (SLIM)  Independent visualization of images, tracings, or specimens: yes    Risk of Complications, Morbidity, and/or Mortality  Presenting problems: high  Diagnostic procedures: high  Management options: high    Patient Progress  Patient progress: improved      CLINICAL IMPRESSION  Final diagnoses:   Nausea and vomiting   Unresponsive episode   Aspiration into airway, initial encounter   Hypoxia       DISPOSITION  Time reflects when diagnosis was documented in both MDM as applicable and the Disposition within this note     Time User Action Codes Description Comment    10/1/2022  3:44 PM Michaela Robledo [R11 2] Nausea and vomiting     10/1/2022  3:44 PM Radha Seed Add [R41 89] Unresponsive episode     10/1/2022  3:44 PM Radha Seed Add [N41 142A] Aspiration into airway, initial encounter     10/1/2022  3:44 PM Radha Seed Add [R09 02] Hypoxia       ED Disposition     ED Disposition   Admit    Condition   Stable    Date/Time   Sat Oct 1, 2022  6:14 PM    Comment   Case was discussed with Dr Kylah Prather and the patient's admission status was agreed to be Admission Status: inpatient status to the service of Dr Kylah Prather             Follow-up Information    None          Flaquita Cochran MD  10/01/22 3746

## 2022-10-01 NOTE — ASSESSMENT & PLAN NOTE
History of renal transplant, now has end-stage renal disease due to failed renal transplant is on hemodialysis Tuesday Thursday Saturday, tolerated hemodialysis today 10/1/2022

## 2022-10-02 LAB
ALBUMIN SERPL BCP-MCNC: 3 G/DL (ref 3.5–5)
ALP SERPL-CCNC: 78 U/L (ref 46–116)
ALT SERPL W P-5'-P-CCNC: 14 U/L (ref 12–78)
ANION GAP SERPL CALCULATED.3IONS-SCNC: 12 MMOL/L (ref 4–13)
AST SERPL W P-5'-P-CCNC: 7 U/L (ref 5–45)
BASOPHILS # BLD AUTO: 0.03 THOUSANDS/ΜL (ref 0–0.1)
BASOPHILS NFR BLD AUTO: 0 % (ref 0–1)
BILIRUB SERPL-MCNC: 1.04 MG/DL (ref 0.2–1)
BUN SERPL-MCNC: 38 MG/DL (ref 5–25)
CALCIUM ALBUM COR SERPL-MCNC: 9.3 MG/DL (ref 8.3–10.1)
CALCIUM SERPL-MCNC: 8.5 MG/DL (ref 8.3–10.1)
CHLORIDE SERPL-SCNC: 101 MMOL/L (ref 96–108)
CO2 SERPL-SCNC: 22 MMOL/L (ref 21–32)
CREAT SERPL-MCNC: 5.14 MG/DL (ref 0.6–1.3)
EOSINOPHIL # BLD AUTO: 0.19 THOUSAND/ΜL (ref 0–0.61)
EOSINOPHIL NFR BLD AUTO: 2 % (ref 0–6)
ERYTHROCYTE [DISTWIDTH] IN BLOOD BY AUTOMATED COUNT: 14.2 % (ref 11.6–15.1)
GFR SERPL CREATININE-BSD FRML MDRD: 11 ML/MIN/1.73SQ M
GLUCOSE SERPL-MCNC: 129 MG/DL (ref 65–140)
GLUCOSE SERPL-MCNC: 151 MG/DL (ref 65–140)
GLUCOSE SERPL-MCNC: 158 MG/DL (ref 65–140)
GLUCOSE SERPL-MCNC: 164 MG/DL (ref 65–140)
GLUCOSE SERPL-MCNC: 207 MG/DL (ref 65–140)
GLUCOSE SERPL-MCNC: 229 MG/DL (ref 65–140)
GLUCOSE SERPL-MCNC: 276 MG/DL (ref 65–140)
GLUCOSE SERPL-MCNC: 307 MG/DL (ref 65–140)
GLUCOSE SERPL-MCNC: 63 MG/DL (ref 65–140)
HAV IGM SER QL: NORMAL
HBV CORE IGM SER QL: NORMAL
HBV SURFACE AG SER QL: NORMAL
HCT VFR BLD AUTO: 32.1 % (ref 36.5–49.3)
HCV AB SER QL: NORMAL
HGB BLD-MCNC: 10.4 G/DL (ref 12–17)
IMM GRANULOCYTES # BLD AUTO: 0.04 THOUSAND/UL (ref 0–0.2)
IMM GRANULOCYTES NFR BLD AUTO: 0 % (ref 0–2)
INR PPP: 1.03 (ref 0.84–1.19)
LIPASE SERPL-CCNC: 20 U/L (ref 73–393)
LYMPHOCYTES # BLD AUTO: 1.03 THOUSANDS/ΜL (ref 0.6–4.47)
LYMPHOCYTES NFR BLD AUTO: 8 % (ref 14–44)
MAGNESIUM SERPL-MCNC: 2.1 MG/DL (ref 1.6–2.6)
MCH RBC QN AUTO: 33.9 PG (ref 26.8–34.3)
MCHC RBC AUTO-ENTMCNC: 32.4 G/DL (ref 31.4–37.4)
MCV RBC AUTO: 105 FL (ref 82–98)
MONOCYTES # BLD AUTO: 0.91 THOUSAND/ΜL (ref 0.17–1.22)
MONOCYTES NFR BLD AUTO: 7 % (ref 4–12)
NEUTROPHILS # BLD AUTO: 10.09 THOUSANDS/ΜL (ref 1.85–7.62)
NEUTS SEG NFR BLD AUTO: 83 % (ref 43–75)
NRBC BLD AUTO-RTO: 0 /100 WBCS
PHOSPHATE SERPL-MCNC: 4.3 MG/DL (ref 2.7–4.5)
PLATELET # BLD AUTO: 219 THOUSANDS/UL (ref 149–390)
PMV BLD AUTO: 8.2 FL (ref 8.9–12.7)
POTASSIUM SERPL-SCNC: 4.3 MMOL/L (ref 3.5–5.3)
PROT SERPL-MCNC: 6.7 G/DL (ref 6.4–8.4)
PROTHROMBIN TIME: 13.7 SECONDS (ref 11.6–14.5)
RBC # BLD AUTO: 3.07 MILLION/UL (ref 3.88–5.62)
SODIUM SERPL-SCNC: 135 MMOL/L (ref 135–147)
TACROLIMUS BLD-MCNC: 6.9 NG/ML (ref 2–20)
WBC # BLD AUTO: 12.29 THOUSAND/UL (ref 4.31–10.16)

## 2022-10-02 PROCEDURE — 85025 COMPLETE CBC W/AUTO DIFF WBC: CPT | Performed by: INTERNAL MEDICINE

## 2022-10-02 PROCEDURE — 83735 ASSAY OF MAGNESIUM: CPT | Performed by: INTERNAL MEDICINE

## 2022-10-02 PROCEDURE — 82948 REAGENT STRIP/BLOOD GLUCOSE: CPT

## 2022-10-02 PROCEDURE — 84100 ASSAY OF PHOSPHORUS: CPT | Performed by: INTERNAL MEDICINE

## 2022-10-02 PROCEDURE — 80074 ACUTE HEPATITIS PANEL: CPT | Performed by: INTERNAL MEDICINE

## 2022-10-02 PROCEDURE — 94760 N-INVAS EAR/PLS OXIMETRY 1: CPT

## 2022-10-02 PROCEDURE — 94664 DEMO&/EVAL PT USE INHALER: CPT

## 2022-10-02 PROCEDURE — 83690 ASSAY OF LIPASE: CPT | Performed by: INTERNAL MEDICINE

## 2022-10-02 PROCEDURE — 94668 MNPJ CHEST WALL SBSQ: CPT

## 2022-10-02 PROCEDURE — 80053 COMPREHEN METABOLIC PANEL: CPT | Performed by: INTERNAL MEDICINE

## 2022-10-02 PROCEDURE — 99223 1ST HOSP IP/OBS HIGH 75: CPT | Performed by: NURSE PRACTITIONER

## 2022-10-02 PROCEDURE — 99232 SBSQ HOSP IP/OBS MODERATE 35: CPT | Performed by: INTERNAL MEDICINE

## 2022-10-02 PROCEDURE — 85610 PROTHROMBIN TIME: CPT | Performed by: INTERNAL MEDICINE

## 2022-10-02 RX ORDER — DEXTROSE MONOHYDRATE 25 G/50ML
25 INJECTION, SOLUTION INTRAVENOUS ONCE
Status: COMPLETED | OUTPATIENT
Start: 2022-10-02 | End: 2022-10-02

## 2022-10-02 RX ORDER — CEFEPIME HYDROCHLORIDE 1 G/50ML
1000 INJECTION, SOLUTION INTRAVENOUS EVERY 24 HOURS
Status: DISCONTINUED | OUTPATIENT
Start: 2022-10-02 | End: 2022-10-03 | Stop reason: HOSPADM

## 2022-10-02 RX ORDER — INSULIN LISPRO 100 [IU]/ML
1-5 INJECTION, SOLUTION INTRAVENOUS; SUBCUTANEOUS
Status: DISCONTINUED | OUTPATIENT
Start: 2022-10-02 | End: 2022-10-03 | Stop reason: HOSPADM

## 2022-10-02 RX ORDER — CALCITRIOL 0.25 UG/1
0.25 CAPSULE, LIQUID FILLED ORAL 3 TIMES WEEKLY
Status: DISCONTINUED | OUTPATIENT
Start: 2022-10-04 | End: 2022-10-03 | Stop reason: HOSPADM

## 2022-10-02 RX ORDER — CINACALCET 30 MG/1
30 TABLET, FILM COATED ORAL 3 TIMES WEEKLY
Status: DISCONTINUED | OUTPATIENT
Start: 2022-10-04 | End: 2022-10-03 | Stop reason: HOSPADM

## 2022-10-02 RX ORDER — CALCIUM CARBONATE 200(500)MG
750 TABLET,CHEWABLE ORAL 3 TIMES WEEKLY
Status: DISCONTINUED | OUTPATIENT
Start: 2022-10-04 | End: 2022-10-03 | Stop reason: HOSPADM

## 2022-10-02 RX ORDER — INSULIN LISPRO 100 [IU]/ML
3 INJECTION, SOLUTION INTRAVENOUS; SUBCUTANEOUS
Status: DISCONTINUED | OUTPATIENT
Start: 2022-10-02 | End: 2022-10-03 | Stop reason: HOSPADM

## 2022-10-02 RX ADMIN — METRONIDAZOLE 500 MG: 500 INJECTION, SOLUTION INTRAVENOUS at 09:57

## 2022-10-02 RX ADMIN — ASPIRIN 81 MG CHEWABLE TABLET 81 MG: 81 TABLET CHEWABLE at 09:56

## 2022-10-02 RX ADMIN — METRONIDAZOLE 500 MG: 500 INJECTION, SOLUTION INTRAVENOUS at 01:53

## 2022-10-02 RX ADMIN — INSULIN LISPRO 2 UNITS: 100 INJECTION, SOLUTION INTRAVENOUS; SUBCUTANEOUS at 16:25

## 2022-10-02 RX ADMIN — METRONIDAZOLE 500 MG: 500 INJECTION, SOLUTION INTRAVENOUS at 17:23

## 2022-10-02 RX ADMIN — TACROLIMUS 1 MG: 1 CAPSULE ORAL at 21:31

## 2022-10-02 RX ADMIN — AZATHIOPRINE 50 MG: 50 TABLET ORAL at 09:57

## 2022-10-02 RX ADMIN — CEFEPIME HYDROCHLORIDE 1000 MG: 1 INJECTION, SOLUTION INTRAVENOUS at 16:25

## 2022-10-02 RX ADMIN — DEXTROSE MONOHYDRATE 25 ML: 25 INJECTION, SOLUTION INTRAVENOUS at 04:24

## 2022-10-02 RX ADMIN — ATORVASTATIN CALCIUM 80 MG: 40 TABLET, FILM COATED ORAL at 09:57

## 2022-10-02 RX ADMIN — INSULIN GLARGINE 5 UNITS: 100 INJECTION, SOLUTION SUBCUTANEOUS at 09:57

## 2022-10-02 RX ADMIN — TACROLIMUS 1 MG: 1 CAPSULE ORAL at 10:04

## 2022-10-02 RX ADMIN — SODIUM CHLORIDE 250 ML: 0.9 INJECTION, SOLUTION INTRAVENOUS at 02:32

## 2022-10-02 RX ADMIN — INSULIN LISPRO 3 UNITS: 100 INJECTION, SOLUTION INTRAVENOUS; SUBCUTANEOUS at 16:36

## 2022-10-02 RX ADMIN — INSULIN GLARGINE 5 UNITS: 100 INJECTION, SOLUTION SUBCUTANEOUS at 21:30

## 2022-10-02 NOTE — PLAN OF CARE
Problem: Potential for Falls  Goal: Patient will remain free of falls  Description: INTERVENTIONS:  - Educate patient/family on patient safety including physical limitations  - Instruct patient to call for assistance with activity   - Consult OT/PT to assist with strengthening/mobility   - Keep Call bell within reach  - Keep bed low and locked with side rails adjusted as appropriate  - Keep care items and personal belongings within reach  - Initiate and maintain comfort rounds  - Make Fall Risk Sign visible to staff  - Offer Toileting every 2 Hours, in advance of need  - Initiate/Maintain bed/chair alarm  - Obtain necessary fall risk management equipment: alarms  - Apply yellow socks and bracelet for high fall risk patients  - Consider moving patient to room near nurses station  Outcome: Progressing     Problem: PAIN - ADULT  Goal: Verbalizes/displays adequate comfort level or baseline comfort level  Description: Interventions:  - Encourage patient to monitor pain and request assistance  - Assess pain using appropriate pain scale  - Administer analgesics based on type and severity of pain and evaluate response  - Implement non-pharmacological measures as appropriate and evaluate response  - Consider cultural and social influences on pain and pain management  - Notify physician/advanced practitioner if interventions unsuccessful or patient reports new pain  Outcome: Progressing     Problem: INFECTION - ADULT  Goal: Absence or prevention of progression during hospitalization  Description: INTERVENTIONS:  - Assess and monitor for signs and symptoms of infection  - Monitor lab/diagnostic results  - Monitor all insertion sites, i e  indwelling lines, tubes, and drains  - Monitor endotracheal if appropriate and nasal secretions for changes in amount and color  - [place] appropriate cooling/warming therapies per order  - Administer medications as ordered  - Instruct and encourage patient and family to use good hand hygiene technique  - Identify and instruct in appropriate isolation precautions for identified infection/condition  Outcome: Progressing  Goal: Absence of fever/infection during neutropenic period  Description: INTERVENTIONS:  - Monitor WBC    Outcome: Progressing     Problem: SAFETY ADULT  Goal: Patient will remain free of falls  Description: INTERVENTIONS:  - Educate patient/family on patient safety including physical limitations  - Instruct patient to call for assistance with activity   - Consult OT/PT to assist with strengthening/mobility   - Keep Call bell within reach  - Keep bed low and locked with side rails adjusted as appropriate  - Keep care items and personal belongings within reach  - Initiate and maintain comfort rounds  - Make Fall Risk Sign visible to staff  - Offer Toileting every 2 Hours, in advance of need  - Initiate/Maintain bed/chair alarm  - Obtain necessary fall risk management equipment: alarms  - Apply yellow socks and bracelet for high fall risk patients  - Consider moving patient to room near nurses station  Outcome: Progressing  Goal: Maintain or return to baseline ADL function  Description: INTERVENTIONS:  -  Assess patient's ability to carry out ADLs; assess patient's baseline for ADL function and identify physical deficits which impact ability to perform ADLs (bathing, care of mouth/teeth, toileting, grooming, dressing, etc )  - Assess/evaluate cause of self-care deficits   - Assess range of motion  - Assess patient's mobility; develop plan if impaired  - Assess patient's need for assistive devices and provide as appropriate  - Encourage maximum independence but intervene and supervise when necessary  - Involve family in performance of ADLs  - Assess for home care needs following discharge   - Consider OT consult to assist with ADL evaluation and planning for discharge  - Provide patient education as appropriate  Outcome: Progressing  Goal: Maintains/Returns to pre admission functional level  Description: INTERVENTIONS:  - Perform BMAT or MOVE assessment daily    - Set and communicate daily mobility goal to care team and patient/family/caregiver  - Collaborate with rehabilitation services on mobility goals if consulted  - Perform Range of Motion 3 times a day  - Reposition patient every 2 hours  - Dangle patient 3 times a day  - Stand patient 4 times a day  - Ambulate patient 4 times a day  - Out of bed to chair 4 times a day   - Out of bed for meals 3 times a day  - Out of bed for toileting  - Record patient progress and toleration of activity level   Outcome: Progressing     Problem: DISCHARGE PLANNING  Goal: Discharge to home or other facility with appropriate resources  Description: INTERVENTIONS:  - Identify barriers to discharge w/patient and caregiver  - Arrange for needed discharge resources and transportation as appropriate  - Identify discharge learning needs (meds, wound care, etc )  - Arrange for interpretive services to assist at discharge as needed  - Refer to Case Management Department for coordinating discharge planning if the patient needs post-hospital services based on physician/advanced practitioner order or complex needs related to functional status, cognitive ability, or social support system  Outcome: Progressing     Problem: Knowledge Deficit  Goal: Patient/family/caregiver demonstrates understanding of disease process, treatment plan, medications, and discharge instructions  Description: Complete learning assessment and assess knowledge base    Interventions:  - Provide teaching at level of understanding  - Provide teaching via preferred learning methods  Outcome: Progressing     Problem: NEUROSENSORY - ADULT  Goal: Achieves stable or improved neurological status  Description: INTERVENTIONS  - Monitor and report changes in neurological status  - Monitor vital signs such as temperature, blood pressure, glucose, and any other labs ordered   - Initiate measures to prevent increased intracranial pressure  - Monitor for seizure activity and implement precautions if appropriate      Outcome: Progressing     Problem: CARDIOVASCULAR - ADULT  Goal: Maintains optimal cardiac output and hemodynamic stability  Description: INTERVENTIONS:  - Monitor I/O, vital signs and rhythm  - Monitor for S/S and trends of decreased cardiac output  - Administer and titrate ordered vasoactive medications to optimize hemodynamic stability  - Assess quality of pulses, skin color and temperature  - Assess for signs of decreased coronary artery perfusion  - Instruct patient to report change in severity of symptoms  Outcome: Progressing  Goal: Absence of cardiac dysrhythmias or at baseline rhythm  Description: INTERVENTIONS:  - Continuous cardiac monitoring, vital signs, obtain 12 lead EKG if ordered  - Administer antiarrhythmic and heart rate control medications as ordered  - Monitor electrolytes and administer replacement therapy as ordered  Outcome: Progressing     Problem: RESPIRATORY - ADULT  Goal: Achieves optimal ventilation and oxygenation  Description: INTERVENTIONS:  - Assess for changes in respiratory status  - Assess for changes in mentation and behavior  - Position to facilitate oxygenation and minimize respiratory effort  - Oxygen administered by appropriate delivery if ordered  - Initiate smoking cessation education as indicated  - Encourage broncho-pulmonary hygiene including cough, deep breathe, Incentive Spirometry  - Assess the need for suctioning and aspirate as needed  - Assess and instruct to report SOB or any respiratory difficulty  - Respiratory Therapy support as indicated  Outcome: Progressing     Problem: GASTROINTESTINAL - ADULT  Goal: Minimal or absence of nausea and/or vomiting  Description: INTERVENTIONS:  - Administer IV fluids if ordered to ensure adequate hydration  - Maintain NPO status until nausea and vomiting are resolved  - Nasogastric tube if ordered  - Administer ordered antiemetic medications as needed  - Provide nonpharmacologic comfort measures as appropriate  - Advance diet as tolerated, if ordered  - Consider nutrition services referral to assist patient with adequate nutrition and appropriate food choices  Outcome: Progressing  Goal: Oral mucous membranes remain intact  Description: INTERVENTIONS  - Assess oral mucosa and hygiene practices  - Implement preventative oral hygiene regimen  - Implement oral medicated treatments as ordered  - Initiate Nutrition services referral as needed  Outcome: Progressing     Problem: METABOLIC, FLUID AND ELECTROLYTES - ADULT  Goal: Electrolytes maintained within normal limits  Description: INTERVENTIONS:  - Monitor labs and assess patient for signs and symptoms of electrolyte imbalances  - Administer electrolyte replacement as ordered  - Monitor response to electrolyte replacements, including repeat lab results as appropriate  - Instruct patient on fluid and nutrition as appropriate  Outcome: Progressing  Goal: Fluid balance maintained  Description: INTERVENTIONS:  - Monitor labs   - Monitor I/O and WT  - Instruct patient on fluid and nutrition as appropriate  - Assess for signs & symptoms of volume excess or deficit  Outcome: Progressing  Goal: Glucose maintained within target range  Description: INTERVENTIONS:  - Monitor Blood Glucose as ordered  - Assess for signs and symptoms of hyperglycemia and hypoglycemia  - Administer ordered medications to maintain glucose within target range  - Assess nutritional intake and initiate nutrition service referral as needed  Outcome: Progressing     Problem: SKIN/TISSUE INTEGRITY - ADULT  Goal: Skin Integrity remains intact(Skin Breakdown Prevention)  Description: Assess:  -Perform Frank assessment every shift  -Clean and moisturize skin every 4 hours  -Inspect skin when repositioning, toileting, and assisting with ADLS  -Assess under medical devices such as pulse every shift  -Assess extremities for adequate circulation and sensation     Bed Management:  -Have minimal linens on bed & keep smooth, unwrinkled  -Change linens as needed when moist or perspiring  -Avoid sitting or lying in one position for more than 2 hours while in bed  -Keep HOB at 30 degrees     Toileting:  -Offer bedside commode  -Assess for incontinence every 2  -Use incontinent care products after each incontinent episode such as breifs    Activity:  -Mobilize patient 4 times a day  -Encourage activity and walks on unit  -Encourage or provide ROM exercises   -Turn and reposition patient every 2 Hours  -Use appropriate equipment to lift or move patient in bed  -Instruct/ Assist with weight shifting every 2 when out of bed in chair  -Consider limitation of chair time 4 hour intervals    Skin Care:  -Avoid use of baby powder, tape, friction and shearing, hot water or constrictive clothing  -Relieve pressure over bony prominences using alevyn  -Do not massage red bony areas    Next Steps:  -Teach patient strategies to minimize risks such as weight shift  Outcome: Progressing     Problem: Nutrition/Hydration-ADULT  Goal: Nutrient/Hydration intake appropriate for improving, restoring or maintaining nutritional needs  Description: Monitor and assess patient's nutrition/hydration status for malnutrition  Collaborate with interdisciplinary team and initiate plan and interventions as ordered  Monitor patient's weight and dietary intake as ordered or per policy  Utilize nutrition screening tool and intervene as necessary  Determine patient's food preferences and provide high-protein, high-caloric foods as appropriate       INTERVENTIONS:  - Monitor oral intake, urinary output, labs, and treatment plans  - Assess nutrition and hydration status and recommend course of action  - Evaluate amount of meals eaten  - Assist patient with eating if necessary   - Allow adequate time for meals  - Recommend/ encourage appropriate diets, oral nutritional supplements, and vitamin/mineral supplements  - Order, calculate, and assess calorie counts as needed  - Recommend, monitor, and adjust tube feedings and TPN/PPN based on assessed needs  - Assess need for intravenous fluids  - Provide specific nutrition/hydration education as appropriate  - Include patient/family/caregiver in decisions related to nutrition  Outcome: Progressing

## 2022-10-02 NOTE — ASSESSMENT & PLAN NOTE
Patient with acute respiratory failure with hypoxia, required non-rebreather in the emergency room to maintain sats greater than 90%, patient also requiring Vapotherm on admission , admitted to step-down level 1, patient had aspiration event, aspiration pneumonitis versus aspiration pneumonia, continue broad-spectrum antibiotics, trend procalcitonin level    Follow-up cultures, wean oxygen as tolerated, , transition from Vapotherm to nasal cannula/2 L today, case discussed with respiratory care team    Vancomycin, cefepime, Flagyl day 2

## 2022-10-02 NOTE — ASSESSMENT & PLAN NOTE
Present on admission, due to suspected aspiration pneumonia, respiratory rate 20 2 to 34, white blood cell count 18,000, patient required 750 mL normal saline, has history of end-stage renal disease, patient treated with antibiotics as above    The 30ml/kg fluid bolus was not given to the patient despite hypotension and/or significantly elevated lactate of ? 4 and/or presence of septic shock due to: Renal Failure  The patient will be administered 750 of crystalloid fluid instead  Orders for this have been placed in Epic  The patient may receive additional colloid or crystalloid fluids thereafter based on clinical condition       Julio Cesario Feller [facility]

## 2022-10-02 NOTE — ASSESSMENT & PLAN NOTE
Patient's symptoms began today suddenly, after hemodialysis as well as eating out at a [employer] donut, 1 other sick contact related to [employer] donut was reported      Possible viral gastroenteritis, will check acute hepatitis panel/hepatitis a    Check stool cultures, check C diff

## 2022-10-02 NOTE — RESPIRATORY THERAPY NOTE
1315 pt using flutter valve and incentive on own with good technique  Pt does not need any further instructions by therapist  Encouraged continued use  Also pt taken off 2 l/m nc for a trial on RA  Pt does not wear oxygen at home  Will cont to monitor pt

## 2022-10-02 NOTE — PHYSICAL THERAPY NOTE
10/02/22 0825   PT Last Visit   PT Visit Date 10/02/22   Note Type   Additional Comments PT eval order received  Pt's wife also present  Pt is alert  Cooperative  Verbalizes good safety awareness  Pt reports that he has been walking with a prosthesis and a RW or using the w/c for mobility  Pt reports that his prosthesis has been poorly fitting causing skin irritation  He reports that he does not have an appt with a physiatrist until April 2023  Discussed reaching out to the physiatrist to pursue getting a script for a new prosthesis  Discussed risk for pressure sore, wounds, sepsis  Pt at increased risk for infections due to being immunocompromised  Pt and pt's wife verbalized understanding  Pt reports that he is able to transfer OOB and to the chair without difficulty  Pt did not wish to pursue therapy at this time  Pt reports that he will speak with nsg/doctor if he feels that he has had a decline in his functional mobility, but does not need therapy at this time   Will d/c PT per pt's request

## 2022-10-02 NOTE — PROGRESS NOTES
[address] [facility] [address]  Progress Note - Alejandrina Porter 1970, 45 y o  male MRN: [id number]  Unit/Bed#: 407-01 Encounter: [id number]  Primary Care Provider: Kassidy Sims DO   Date and time admitted to hospital: 10/1/2022  2:55 PM    * Acute respiratory failure with hypoxia [facility])  Assessment & Plan  Patient with acute respiratory failure with hypoxia, required non-rebreather in the emergency room to maintain sats greater than 90%, patient also requiring Vapotherm on admission , admitted to step-down level 1, patient had aspiration event, aspiration pneumonitis versus aspiration pneumonia, continue broad-spectrum antibiotics, trend procalcitonin level    Follow-up cultures, wean oxygen as tolerated, , transition from Vapotherm to nasal cannula/2 L today, case discussed with respiratory care team    Vancomycin, cefepime, Flagyl day 2    Gastroenteritis  Assessment & Plan  Patient's symptoms began today suddenly, after hemodialysis as well as eating out at a [employer] donut, 1 other sick contact related to [employer] was reported  Possible viral gastroenteritis, will check acute hepatitis panel/hepatitis a    Check stool cultures, check C diff    Sepsis ([facility] [address] )  Assessment & Plan  Present on admission, due to suspected aspiration pneumonia, respiratory rate 20 2 to 34, white blood cell count 18,000, patient required 750 mL normal saline, has history of end-stage renal disease, patient treated with antibiotics as above    The 30ml/kg fluid bolus was not given to the patient despite hypotension and/or significantly elevated lactate of ? 4 and/or presence of septic shock due to: Renal Failure  The patient will be administered 750 of crystalloid fluid instead  Orders for this have been placed in Epic  The patient may receive additional colloid or crystalloid fluids thereafter based on clinical condition       Rylee Licea    Diarrhea  Assessment & Plan  Check stool studies as above    Type 1 diabetes mellitus [facility])  Assessment & Plan  Lab Results   Component Value Date    HGBA1C 7 4 (H) 2022       Recent Labs     10/01/22  2351 10/02/22  0410 10/02/22  0509 10/02/22  0754   POCGLU 103 63* 158* 129       Blood Sugar Average: Last 72 hrs:  (P) 135     Continue Lantus 10 units twice daily, monitor Accu-Cheks      Start diabetic diet, Humalog 3 units with meals plus sliding scale      Vasovagal syncope  Assessment & Plan  Patient had several episodes of vasovagal syncope today, short episodes, no evidence of seizure activity, no postictal     Treat underlying process as above, suspect combination of acute infection, volume depletion  Renal transplant, status post  Assessment & Plan  History of renal transplant, now has end-stage renal disease due to failed renal transplant is on hemodialysis , tolerated hemodialysis today 10/1/2022        Immunosuppression ([address] )  Assessment & Plan  Chronic immunosuppression, continue broad-spectrum antibiotics pending cultures        Code Status: Level 1 - Full Code    Subjective:   No pain    Objective:     Vitals:   Temp (24hrs), Av 3 °F (36 8 °C), Min:97 6 °F (36 4 °C), Max:99 °F (37 2 °C)    Temp:  [97 6 °F (36 4 °C)-99 °F (37 2 °C)] 99 °F (37 2 °C)  HR:  [63-92] 84  Resp:  [18-34] 20  BP: ()/(39-71) 96/54  SpO2:  [89 %-100 %] 96 %  Body mass index is 18 81 kg/m²  Input and Output Summary (last 24 hours): Intake/Output Summary (Last 24 hours) at 10/2/2022 1047  Last data filed at 10/2/2022 0530  Gross per 24 hour   Intake 1750 ml   Output --   Net 1750 ml       Physical Exam:   Physical Exam  Vitals and nursing note reviewed  Constitutional:       General: He is not in acute distress  Appearance: He is not ill-appearing, toxic-appearing or diaphoretic  HENT:      Head: Normocephalic and atraumatic        Right Ear: External ear normal       Left Ear: External ear normal    Cardiovascular:      Rate and Rhythm: Normal rate  Pulses: Normal pulses  Pulmonary:      Effort: Pulmonary effort is normal    Musculoskeletal:         General: Normal range of motion  Cervical back: Normal range of motion  Comments: Right lower extremity amputation   Skin:     General: Skin is warm and dry  Coloration: Skin is not jaundiced or pale  Findings: No bruising or erythema  Neurological:      General: No focal deficit present  Mental Status: He is alert and oriented to person, place, and time  Mental status is at baseline  Cranial Nerves: No cranial nerve deficit  Additional Data:     Labs:  Results from last 7 days   Lab Units 10/02/22  0530   WBC Thousand/uL 12 29*   HEMOGLOBIN g/dL 10 4*   HEMATOCRIT % 32 1*   PLATELETS Thousands/uL 219   NEUTROS PCT % 83*   LYMPHS PCT % 8*   MONOS PCT % 7   EOS PCT % 2     Results from last 7 days   Lab Units 10/02/22  0530   SODIUM mmol/L 135   POTASSIUM mmol/L 4 3   CHLORIDE mmol/L 101   CO2 mmol/L 22   BUN mg/dL 38*   CREATININE mg/dL 5 14*   ANION GAP mmol/L 12   CALCIUM mg/dL 8 5   ALBUMIN g/dL 3 0*   TOTAL BILIRUBIN mg/dL 1 04*   ALK PHOS U/L 78   ALT U/L 14   AST U/L 7   GLUCOSE RANDOM mg/dL 164*     Results from last 7 days   Lab Units 10/02/22  0530   INR  1 03     Results from last 7 days   Lab Units 10/02/22  0754 10/02/22  0509 10/02/22  0410 10/01/22  2351 10/01/22  2112 10/01/22  1511   POC GLUCOSE mg/dl 129 158* 63* 103 129 228*         Results from last 7 days   Lab Units 10/01/22  1523   LACTIC ACID mmol/L 1 2   PROCALCITONIN ng/ml 0 77*       Lines/Drains:  Invasive Devices  Report    Peripheral Intravenous Line  Duration           Peripheral IV 10/01/22 Left Antecubital <1 day    Peripheral IV 10/01/22 Left Hand <1 day          Line  Duration           Hemodialysis AV Fistula Right Upper arm -- days                      Imaging: No pertinent imaging reviewed      Recent Cultures (last 7 days):   Results from last 7 days   Lab Units 10/01/22  1529 10/01/22  1523   BLOOD CULTURE  Received in Microbiology Lab  Culture in Progress  Received in Microbiology Lab  Culture in Progress  Last 24 Hours Medication List:   Current Facility-Administered Medications   Medication Dose Route Frequency Provider Last Rate    acetaminophen  650 mg Oral Q6H PRN Evlyn Bathe Vinayak, DO      albuterol  2 5 mg Nebulization Q4H PRN Evlyn Bathe Vinayak, DO      aluminum-magnesium hydroxide-simethicone  30 mL Oral Q6H PRN Evlyn Bathe Vinayak, DO      aspirin  81 mg Oral QAM Evlyn Bathe Aleisha Pila, DO      atorvastatin  80 mg Oral QAM Evlyn Bathe Aleisha Pila, DO      azaTHIOprine  50 mg Oral Daily [facility] Bathe [facility]      cefepime  1,000 mg Intravenous Q24H Evlyn Bathe [facility]isha [facility], [facility]      insulin glargine  10 Units Subcutaneous Q12H [address] Evlyn Bathe Aleisha [facility]      insulin lispro  1-5 Units Subcutaneous TID [facility] Evlyn Bathe Aleisha Pila, DO      insulin lispro  3 Units Subcutaneous TID With Meals Evlyn Bathe Aleisha Pila, DO      metroNIDAZOLE  500 mg Intravenous [place] Aleisha Pila,  mg (10/02/22 0957)    ondansetron  4 mg Intravenous Q4H PRN Evlyn Bathe Vinayak, DO      tacrolimus  1 mg Oral Q12H [address] Lizeth Jensen DO          Today, Patient Was Seen By: Lizeth Jensen    **Please Note: This note may have been constructed using a voice recognition system  **

## 2022-10-02 NOTE — CONSULTS
Abram Faria 45 y o  male MRN: [id number]  Unit/Bed#: 407-01 Encounter: [id number]        Assessment and Plan:    1  ESRD on hemodialysis Tuesday, Thursday, Saturday at [facility]  · Most recent HD session 10/1 for 0 9 L ultra filtered  He received 1 5 L bolus in addition to IV antibiotics yesterday  He examines mildly overloaded and will add him on as "evaluation" tomorrow for hemodialysis with ultrafiltration  · Access:  Right AV fistula  · Renal diet with fluid restriction when able to take p o   · Still urinates-recommend Lasix 120 mg IV if decompensates from a volume standpoint  2  History of LDRT 2001  · Remains on immunosuppression  3  Mineral bone disease  · Labs 9/8 phosphorus 3 1 mg/dL, calcium 9 0 mg/dL,  pg/mL  · Continue Sensipar 30 mg with dialysis, calcitriol 0 25 mcg with dialysis, Tums 750 mg with dialysis  Binder on hold  4  Anemia of ESRD  · Continue Epogen 1000 units with hemodialysis  Transfuse for hemoglobin less than 7 0 grams/deciliter  5  Type 1 diabetes mellitus with diabetic nephropathy  6  Hypotension  · Avoid further volume expansion  Utilize midodrine if needed  7  Acute hypoxic respiratory failure in the setting of aspiration pneumonia  · Suspect component of volume overload at this point  8  Sepsis POA secondary to gastroenteritis and aspiration pneumonia  · Management per primary team    HPI:    Rob Rodriguez is a 45 y o  male with ESRD on hemodialysis, history of LDRT 2001 and previous renal transplant, chronic immunosuppression, type 1 diabetes mellitus with diabetic nephropathy, BKA, hypertension, secondary hyperparathyroidism, anemia of ESRD  Presented to [facility] emergency department 10/1 with chief complaint diarrhea, low blood sugar, shortness of breath    Currently being treated in ICU for acute hypoxic respiratory failure secondary to aspiration pneumonia on Flagyl, transient hypotension overnight status post volume expansion  Per patient report, tells me he went to Dr Dora Greco after dialysis yesterday and had Tea 1 hour after drinking tea he developed profuse diarrhea and vomiting  He he believes he aspirated  He states he feels well today but does admit to some swelling around his face  From a Nephrology perspective, undergoes hemodialysis Tuesday, Thursday, Saturday at [facility]  Dr Ayla Mitchell is primary nephrologist   Most recent hemodialysis session 10/1 and 0 9 L removed  Reason for Consult:  ESRD on hemodialysis    Review of Systems:  A complete 10-point review of systems was performed  Aside from what was mentioned in the HPI, it is otherwise negative        Historical Information   Past Medical History:   Diagnosis Date    Bacteremia 12/21/2018    CAD (coronary artery disease)     s/p MARC to LCx, pLAD 2/2018    Cardiac arrest (HCC)     Chronic kidney disease     Diabetes mellitus type 1 ([facility] )     Dialysis patient [facility])     Access in right chest    GI bleed     Hyperlipidemia     Hypertension     Infection at site of external fixator pin ([facility] )     MI (myocardial infarction) ([facility] )     Pneumonia     Last Assessed [id number]    Renal failure     Renal transplant, status post 07/21/2007     Past Surgical History:   Procedure Laterality Date    AMPUTATION Right 02/2019    aboce knee    ANKLE FRACTURE SURGERY      ANKLE HARDWARE REMOVAL Right 7/31/2017    Procedure: REMOVAL HARDWARE ANKLE;  Surgeon: Ramya Logan MD;  Location: [facility] MAIN [facility];  Service: Orthopedics    ANKLE HARDWARE REMOVAL Right 8/17/2017    Procedure: TIBIA FAILED HARDWARE REMOVAL;  Surgeon: Ramya Logan MD;  Location: [facility];  Service: Orthopedics    CLOSED REDUCTION ANKLE Right 7/3/2017    Procedure: CLOSED REDUCTION DISTAL TIB-FIB AND CASTING VS;  Surgeon: Ramya Logan MD;  Location: [facility];  Service: Orthopedics    CORONARY ANGIOPLASTY WITH STENT PLACEMENT  02/2018    CAD s/p MARC to LCx, pLAD    ESOPHAGOGASTRODUODENOSCOPY N/A 12/20/2018    Procedure: ESOPHAGOGASTRODUODENOSCOPY (EGD) in ICU; Surgeon: Aimee Orourke MD;  Location: [facility]; Service: Gastroenterology    EYE SURGERY      FRACTURE SURGERY      ORIF Rt Ankle    GLUTAMIC ACID DECARBOXYLASE (HISTORICAL)      IR AV FISTULAGRAM/GRAFTOGRAM  4/16/2020    IR PICC LINE  8/20/2018    IR TUNNELED DIALYSIS CATHETER CHECK/CHANGE/REPOSITION/ANGIOPLASTY  1/8/2020    IR TUNNELED DIALYSIS CATHETER PLACEMENT  10/21/2019    IR TUNNELED DIALYSIS CATHETER REMOVAL  5/29/2020    IR VENOUS LINE REMOVAL  10/5/2018    LEG AMPUTATION Right 02/01/2019    Above the knee    NEPHRECTOMY TRANSPLANTED ORGAN      [place] ANASTOMOSIS,AV,ANY SITE Right 2/11/2020    Procedure: CREATION FISTULA ARTERIOVENOUS (AV) right upper extremity;  Surgeon: Boy Crum MD;  Location: [address] [place] [address] MAIN OR;  Service: Vascular    [place] OPEN TREATMENT FRACTURE DISTAL TIBIA FIBULA Right 7/3/2017    Procedure: OPEN REDUCTION W/ INTERNAL FIXATION (ORIF);   Surgeon: Jamison Krabbe, MD;  Location: [facility];  Service: Orthopedics    TOE AMPUTATION Right 10/27/2016    Procedure: AMPUTATION TOE;  Surgeon: Neha Aponte DPM;  Location: [facility];  Service:      Social History   Social History     Substance and Sexual Activity   Alcohol Use Not Currently    Alcohol/week: 0 0 standard drinks     Social History     Substance and Sexual Activity   Drug Use Never     Social History     Tobacco Use   Smoking Status Never Smoker   Smokeless Tobacco Never Used       Family History:   Family History   Problem Relation Age of Onset    Diabetes Brother     Coronary artery disease Mother     No Known Problems Father        Medications:  Pertinent medications were reviewed  Current Facility-Administered Medications   Medication Dose Route Frequency Provider Last Rate    acetaminophen  650 mg Oral Q6H PRN Prashanth Zak Vinayak, DO      albuterol  2 5 mg Nebulization Q4H PRN Joan Skflori, DO     Magnus Pert aluminum-magnesium hydroxide-simethicone  30 mL Oral Q6H PRN Orien Lis Terence dun, DO      aspirin  81 mg Oral QAM [place], [place]      atorvastatin  80 mg Oral QAM [place]      azaTHIOprine  50 mg Oral Daily [place]      cefepime  1,000 mg Intravenous Q24H [place]      insulin glargine  10 Units Subcutaneous Q12H [address] Ori Lis [facility], [place]      insulin lispro  1-5 Units Subcutaneous TID [facility], [place]     Earna Shy insulin lispro  3 Units Subcutaneous TID With Meals Orien Lis Terence dun, DO      metroNIDAZOLE  500 mg Intravenous Sanchez Bora [facility],  mg (10/02/22 0957)    ondansetron  4 mg Intravenous Q4H PRN Orien Lis Vinayak, DO      tacrolimus  1 mg Oral Q12H [address] Orien Lis Terence dun, DO           No Known Allergies      Vitals:   BP 96/54 (BP Location: Left arm)   Pulse 84   Temp 99 °F (37 2 °C) (Axillary)   Resp 20   Ht 5' 4" (1 626 m)   Wt 49 7 kg (109 lb 9 1 oz)   SpO2 96%   BMI 18 81 kg/m²   Body mass index is 18 81 kg/m²    SpO2: 96 %,   SpO2 Activity: At Rest,   O2 Device: Nasal cannula      Intake/Output Summary (Last 24 hours) at 10/2/2022 1054  Last data filed at 10/2/2022 0530  Gross per 24 hour   Intake 1750 ml   Output --   Net 1750 ml     Invasive Devices  Report    Peripheral Intravenous Line  Duration           Peripheral IV 10/01/22 Left Antecubital <1 day    Peripheral IV 10/01/22 Left Hand <1 day          Line  Duration           Hemodialysis AV Fistula Right Upper arm -- days                Physical Exam:  General: conscious, cooperative, in no acute distress  Eyes: conjunctivae pink, anicteric sclerae periorbital edema  ENT: lips and mucous membranes moist  Neck: supple, right JVD, no masses  Chest:  Diminished breath sounds bilateral, no crackles, ronchus or wheezings  CVS: S1 & S2, normal rate, regular rhythm  Abdomen: soft, non-tender, non-distended, normoactive bowel sounds  Extremities: no edema of both legs BKA  Skin: no rash  Neuro: awake, alert, oriented      Diagnostic Data:  Lab: I have personally reviewed pertinent lab results  ,   CBC:  Results from last 7 days   Lab Units 10/02/22  0530   WBC Thousand/uL 12 29*   HEMOGLOBIN g/dL 10 4*   HEMATOCRIT % 32 1*   PLATELETS Thousands/uL 219      CMP:   Lab Results   Component Value Date    SODIUM 135 10/02/2022    K 4 3 10/02/2022     10/02/2022    CO2 22 10/02/2022    BUN 38 (H) 10/02/2022    CREATININE 5 14 (H) 10/02/2022    CALCIUM 8 5 10/02/2022    AST 7 10/02/2022    ALT 14 10/02/2022    ALKPHOS 78 10/02/2022    EGFR 11 10/02/2022   ,   PT/INR:   Lab Results   Component Value Date    INR 1 03 10/02/2022   ,   Magnesium: No components found for: MAG,  Phosphorous:   Lab Results   Component Value Date    PHOS 4 3 10/02/2022       Microbiology:  @LABNT,(urinecx:7)@        Nakul Mukherjee    Portions of the record may have been created with voice recognition software  Occasional wrong word or "sound a like" substitutions may have occurred due to the inherent limitations of voice recognition software  Read the chart carefully and recognize, using context, where substitutions have occurred

## 2022-10-02 NOTE — NURSING NOTE
Norah Pettit RN at bedside as 2nd RN for skin assessment  Patient refused skin assessment  Patient educated regarding importance of skin assessment at admission and risks associated with possible skin breakdown with preexisting skin injuries  Patient again refused skin assessment  Patient verbalized understanding of education provided  As per patient's wife who is at bedside, patient has deep tissue injury on buttocks   This RN unable to confirm the statement or visually assess the area due to patient's refusal

## 2022-10-02 NOTE — RESPIRATORY THERAPY NOTE
10/02/22 0900   Respiratory Assessment   General Appearance Alert; Awake   Respiratory Pattern Normal   Chest Assessment Chest expansion symmetrical;Trachea midline   Bilateral Breath Sounds Diminished   R Breath Sounds Rhonchi  (on RT with coughing)   Cough Productive;Strong;Moist   Resp Comments pt taken off vapotherm and on 2 l/m nc, also started pt on flutter valve   O2 Device 2 l/m   Oxygen Therapy/Pulse Ox   O2 Device Nasal cannula   O2 Therapy Oxygen   Nasal Cannula O2 Flow Rate (L/min) 2 L/min   Calculated FIO2 (%) - Nasal Cannula 28   O2 Flow Rate (L/min) 2 L/min   SpO2 96 %   SpO2 Activity At Rest

## 2022-10-02 NOTE — ASSESSMENT & PLAN NOTE
Lab Results   Component Value Date    HGBA1C 7 4 (H) 08/21/2022       Recent Labs     10/01/22  2351 10/02/22  0410 10/02/22  0509 10/02/22  0754   POCGLU 103 63* 158* 129       Blood Sugar Average: Last 72 hrs:  (P) 135     Continue Lantus 10 units twice daily, monitor Accu-Cheks      Start diabetic diet, Humalog 3 units with meals plus sliding scale

## 2022-10-03 ENCOUNTER — TRANSITIONAL CARE MANAGEMENT (OUTPATIENT)
Dept: FAMILY MEDICINE CLINIC | Facility: CLINIC | Age: 52
End: 2022-10-03

## 2022-10-03 VITALS
WEIGHT: 116.18 LBS | HEART RATE: 90 BPM | RESPIRATION RATE: 18 BRPM | HEIGHT: 64 IN | DIASTOLIC BLOOD PRESSURE: 62 MMHG | SYSTOLIC BLOOD PRESSURE: 131 MMHG | OXYGEN SATURATION: 95 % | BODY MASS INDEX: 19.84 KG/M2 | TEMPERATURE: 98.9 F

## 2022-10-03 DIAGNOSIS — E10.22 TYPE 1 DIABETES MELLITUS WITH DIABETIC CHRONIC KIDNEY DISEASE, UNSPECIFIED CKD STAGE (HCC): ICD-10-CM

## 2022-10-03 LAB
ALBUMIN SERPL BCP-MCNC: 2.9 G/DL (ref 3.5–5)
ALP SERPL-CCNC: 80 U/L (ref 46–116)
ALT SERPL W P-5'-P-CCNC: 11 U/L (ref 12–78)
ANION GAP SERPL CALCULATED.3IONS-SCNC: 13 MMOL/L (ref 4–13)
AST SERPL W P-5'-P-CCNC: <5 U/L (ref 5–45)
ATRIAL RATE: 68 BPM
BASOPHILS # BLD AUTO: 0.02 THOUSANDS/ΜL (ref 0–0.1)
BASOPHILS NFR BLD AUTO: 0 % (ref 0–1)
BILIRUB SERPL-MCNC: 0.73 MG/DL (ref 0.2–1)
BUN SERPL-MCNC: 61 MG/DL (ref 5–25)
CALCIUM ALBUM COR SERPL-MCNC: 9.5 MG/DL (ref 8.3–10.1)
CALCIUM SERPL-MCNC: 8.6 MG/DL (ref 8.3–10.1)
CHLORIDE SERPL-SCNC: 102 MMOL/L (ref 96–108)
CO2 SERPL-SCNC: 21 MMOL/L (ref 21–32)
CREAT SERPL-MCNC: 7.63 MG/DL (ref 0.6–1.3)
EOSINOPHIL # BLD AUTO: 0.26 THOUSAND/ΜL (ref 0–0.61)
EOSINOPHIL NFR BLD AUTO: 3 % (ref 0–6)
ERYTHROCYTE [DISTWIDTH] IN BLOOD BY AUTOMATED COUNT: 14 % (ref 11.6–15.1)
GFR SERPL CREATININE-BSD FRML MDRD: 7 ML/MIN/1.73SQ M
GLUCOSE SERPL-MCNC: 155 MG/DL (ref 65–140)
GLUCOSE SERPL-MCNC: 98 MG/DL (ref 65–140)
HCT VFR BLD AUTO: 29.4 % (ref 36.5–49.3)
HGB BLD-MCNC: 10.1 G/DL (ref 12–17)
IMM GRANULOCYTES # BLD AUTO: 0.04 THOUSAND/UL (ref 0–0.2)
IMM GRANULOCYTES NFR BLD AUTO: 1 % (ref 0–2)
INR PPP: 1.11 (ref 0.84–1.19)
LYMPHOCYTES # BLD AUTO: 1.29 THOUSANDS/ΜL (ref 0.6–4.47)
LYMPHOCYTES NFR BLD AUTO: 16 % (ref 14–44)
MAGNESIUM SERPL-MCNC: 2.2 MG/DL (ref 1.6–2.6)
MCH RBC QN AUTO: 35.7 PG (ref 26.8–34.3)
MCHC RBC AUTO-ENTMCNC: 34.4 G/DL (ref 31.4–37.4)
MCV RBC AUTO: 104 FL (ref 82–98)
MONOCYTES # BLD AUTO: 0.73 THOUSAND/ΜL (ref 0.17–1.22)
MONOCYTES NFR BLD AUTO: 9 % (ref 4–12)
MRSA NOSE QL CULT: NORMAL
NEUTROPHILS # BLD AUTO: 5.73 THOUSANDS/ΜL (ref 1.85–7.62)
NEUTS SEG NFR BLD AUTO: 71 % (ref 43–75)
NRBC BLD AUTO-RTO: 0 /100 WBCS
P AXIS: 75 DEGREES
PHOSPHATE SERPL-MCNC: 4.6 MG/DL (ref 2.7–4.5)
PLATELET # BLD AUTO: 205 THOUSANDS/UL (ref 149–390)
PMV BLD AUTO: 8.7 FL (ref 8.9–12.7)
POTASSIUM SERPL-SCNC: 4.5 MMOL/L (ref 3.5–5.3)
PR INTERVAL: 142 MS
PROCALCITONIN SERPL-MCNC: 17.53 NG/ML
PROT SERPL-MCNC: 6.8 G/DL (ref 6.4–8.4)
PROTHROMBIN TIME: 14.5 SECONDS (ref 11.6–14.5)
QRS AXIS: 80 DEGREES
QRSD INTERVAL: 100 MS
QT INTERVAL: 426 MS
QTC INTERVAL: 452 MS
RBC # BLD AUTO: 2.83 MILLION/UL (ref 3.88–5.62)
SODIUM SERPL-SCNC: 136 MMOL/L (ref 135–147)
T WAVE AXIS: 85 DEGREES
VENTRICULAR RATE: 68 BPM
WBC # BLD AUTO: 8.07 THOUSAND/UL (ref 4.31–10.16)

## 2022-10-03 PROCEDURE — 93010 ELECTROCARDIOGRAM REPORT: CPT | Performed by: INTERNAL MEDICINE

## 2022-10-03 PROCEDURE — 85025 COMPLETE CBC W/AUTO DIFF WBC: CPT | Performed by: INTERNAL MEDICINE

## 2022-10-03 PROCEDURE — 83735 ASSAY OF MAGNESIUM: CPT | Performed by: INTERNAL MEDICINE

## 2022-10-03 PROCEDURE — 84145 PROCALCITONIN (PCT): CPT | Performed by: INTERNAL MEDICINE

## 2022-10-03 PROCEDURE — 85610 PROTHROMBIN TIME: CPT | Performed by: INTERNAL MEDICINE

## 2022-10-03 PROCEDURE — 80053 COMPREHEN METABOLIC PANEL: CPT | Performed by: INTERNAL MEDICINE

## 2022-10-03 PROCEDURE — 82948 REAGENT STRIP/BLOOD GLUCOSE: CPT

## 2022-10-03 PROCEDURE — 84100 ASSAY OF PHOSPHORUS: CPT | Performed by: INTERNAL MEDICINE

## 2022-10-03 PROCEDURE — 99239 HOSP IP/OBS DSCHRG MGMT >30: CPT | Performed by: INTERNAL MEDICINE

## 2022-10-03 RX ORDER — IBUPROFEN 600 MG/1
TABLET ORAL
Qty: 2 KIT | Refills: 0 | Status: SHIPPED | OUTPATIENT
Start: 2022-10-03 | End: 2022-10-03 | Stop reason: SDUPTHER

## 2022-10-03 RX ORDER — METRONIDAZOLE 500 MG/1
500 TABLET ORAL EVERY 8 HOURS SCHEDULED
Qty: 15 TABLET | Refills: 0 | Status: SHIPPED | OUTPATIENT
Start: 2022-10-03 | End: 2022-10-08

## 2022-10-03 RX ORDER — IBUPROFEN 600 MG/1
1 TABLET ORAL AS NEEDED
Qty: 2 KIT | Refills: 0 | Status: SHIPPED | OUTPATIENT
Start: 2022-10-03 | End: 2022-10-03

## 2022-10-03 RX ORDER — IBUPROFEN 600 MG/1
TABLET ORAL
Qty: 2 KIT | Refills: 0 | Status: SHIPPED | OUTPATIENT
Start: 2022-10-03

## 2022-10-03 RX ORDER — CEFDINIR 300 MG/1
300 CAPSULE ORAL EVERY 24 HOURS
Qty: 5 CAPSULE | Refills: 0 | Status: SHIPPED | OUTPATIENT
Start: 2022-10-03 | End: 2022-10-08

## 2022-10-03 RX ADMIN — ATORVASTATIN CALCIUM 80 MG: 40 TABLET, FILM COATED ORAL at 08:26

## 2022-10-03 RX ADMIN — METRONIDAZOLE 500 MG: 500 INJECTION, SOLUTION INTRAVENOUS at 01:32

## 2022-10-03 RX ADMIN — TACROLIMUS 1 MG: 1 CAPSULE ORAL at 08:28

## 2022-10-03 RX ADMIN — AZATHIOPRINE 50 MG: 50 TABLET ORAL at 08:26

## 2022-10-03 RX ADMIN — ASPIRIN 81 MG CHEWABLE TABLET 81 MG: 81 TABLET CHEWABLE at 08:26

## 2022-10-03 RX ADMIN — INSULIN GLARGINE 5 UNITS: 100 INJECTION, SOLUTION SUBCUTANEOUS at 08:26

## 2022-10-03 NOTE — CASE MANAGEMENT
Case Management Assessment & Discharge Planning Note    Patient name Flaco Lau  [id number]  : 1970 Date 10/3/2022       Current Admission Date: 10/1/2022  Current Admission Diagnosis:Acute respiratory failure with hypoxia [facility])   Patient Active Problem List    Diagnosis Date Noted    Gastroenteritis 10/01/2022    Vasovagal syncope 10/01/2022    Elevated MCV 02/10/2021    Hyperbilirubinemia 02/10/2021    Acute on chronic diastolic CHF (congestive heart failure) ([address] ) 02/10/2021    Elevated procalcitonin 02/10/2021    Mitral valve insufficiency 02/10/2021    Abnormal EKG 02/10/2021    HCAP (healthcare-associated pneumonia) 2020    Acute on chronic respiratory failure with hypoxia ([facility] ) 2020    Community acquired pneumonia of right lower lobe of lung 2020    Respiratory distress 2020    Lesion of pancreas 2020    Abnormal CT scan, colon 2020    Chronic kidney disease, unspecified 01/15/2020    Coronary artery disease involving native coronary artery of native heart without angina pectoris 2019    Pre-operative cardiovascular examination 2019    ESRD (end stage renal disease) on dialysis ([address] ) 2019    Hypertensive urgency 2019    Hx of right BKA ([address] ) 10/21/2019    Acute pulmonary edema ([facility] ) 10/20/2019    Chronic anemia 10/14/2019    Pulmonary hypertension ([address] ) 10/13/2019    Acute blood loss anemia 2019    Acute respiratory failure with hypoxia ([address] ) 2019    S/P AKA (above knee amputation), right ([address] ) 2019    Depressed left ventricular ejection fraction 2019    Hyperphosphatemia 2018    Hyponatremia 2018    Gastrointestinal hemorrhage 2018    Severe sepsis ([address] ) 2018    Urinary retention 2017    Chronic osteomyelitis of tibia ([address] ) 2017    DKA (diabetic ketoacidoses) 2017    Elevated troponin 2017    Diarrhea 09/23/2017    Cellulitis of ankle 09/23/2017    Non-ST elevation myocardial infarction (NSTEMI), type 2 09/23/2017    Closed fracture of distal end of right tibia with routine healing 07/03/2017    Osteomyelitis ([facility] ) 12/07/2016    Poor circulation 12/07/2016    Sepsis ([facility] ) 10/27/2016    Type 1 diabetes mellitus ([place] [address] ) 10/26/2016    Renal transplant, status post 10/26/2016    Hyperkalemia 10/26/2016    Immunosuppression ([facility] ) 11/04/2014    Hypertension 08/04/2010      LOS (days): 2  Geometric Mean LOS (GMLOS) (days): 5 00  Days to GMLOS:3 3     OBJECTIVE:    Risk of Unplanned Readmission Score: 20 95         Current admission status: Inpatient       Preferred Pharmacy:   [address] [address]  Phone: [phone] Fax: [phone]    Primary Care Provider: Dario Tipton DO    Primary Insurance: MEDICARE  Secondary Insurance: CAPITAL    ASSESSMENT:  [facility], [address] [place] [address] Drive Representative - Spouse   Primary Phone: [phone] (Mobile)  Home Phone: [phone]               Advance Directives  Does patient have a [address]?: No  Was patient offered paperwork?: Yes (declined)  Does patient currently have a Health Care decision maker?: Yes, please see Health Care Proxy section  Does patient have Advance Directives?: No  Was patient offered paperwork?: Yes (declined)  Primary Contact: Beindy Milor-wife              Patient Information  Admitted from[de-identified] Home  During Assessment patient was accompanied by: Not accompanied during assessment  Assessment information provided by[de-identified] Patient  Primary Caregiver: Self  Support Systems: Self, Spouse/significant other  [place] of Residence: [facility]note 3] [address] do you live in?: [place]  Home entry access options   Select all that apply : No steps to enter home  Type of Current Residence: [facility]  In the last 12 months, was there a time when you were not able to pay the mortgage or rent on time?: No  In the last 12 months, how many places have you lived?: 1  In the last 12 months, was there a time when you did not have a steady place to sleep or slept in a shelter (including now)?: No  Homeless/housing insecurity resource given?: N/A  Living Arrangements: Lives w/ Spouse/significant other  Is patient a ?: No    Activities of Daily Living Prior to Admission  Functional Status: Independent  Completes ADLs independently?: Yes  Ambulates independently?: Yes  Does patient use assisted devices?: Yes  Assisted Devices (DME) used: [employer], Wheelchair  Does patient currently own DME?: Yes  What DME does the patient currently own?: Wheelchair, [employer]  Does patient have a history of Outpatient Therapy (PT/OT)?: No  Does the patient have a history of Short-Term Rehab?: No  Does patient have a history of HHC?: Yes (agency unknown)  Does patient currently have [facility]?: No         Patient Information Continued  Income Source: SSI/SSD  Does patient have prescription coverage?: Yes  Within the past 12 months, you worried that your food would run out before you got the money to buy more : Never true  Within the past 12 months, the food you bought just didn't last and you didn't have money to get more : Never true  Food insecurity resource given?: N/A  Does patient receive dialysis treatments?: Yes (tuesday, thursday and saturday   [facility])  Does patient have a history of substance abuse?: No  Does patient have a history of Mental Health Diagnosis?: No         Means of Transportation  Means of Transport to [facility][de-identified] Family transport  In the past 12 months, has lack of transportation kept you from medical appointments or from getting medications?: No  In the past 12 months, has lack of transportation kept you from meetings, work, or from getting things needed for daily living?: No  Was application for public transport provided?: N/A        DISCHARGE DETAILS:    Discharge planning discussed with[de-identified] patient        CM contacted family/caregiver?: No- see comments (declined)  Were Treatment Team discharge recommendations reviewed with patient/caregiver?: Yes  Did patient/caregiver verbalize understanding of patient care needs?: Yes  Were patient/caregiver advised of the risks associated with not following Treatment Team discharge recommendations?: Yes    Contacts  Contact Method: In Person  Reason/Outcome: Discharge [address]         Is the patient interested in [place] [address] at discharge?: No    DME Referral Provided  Referral made for DME?: No         Would you like to participate in our [facility] [address] service program?  : No - Declined    Treatment Team Recommendation: Home  Discharge Destination Plan[de-identified] Home       Cm met with the patient to evaluate the patients prior function and living situation and any barriers to d/c and form a safe d/c plan  Cm also evaluated the patient for any services in the home or needs for services  CM also discussed with patient that their preferences will be taken into account/consideration  Discussed with patient preferences on discharge;understanding how to manage health at home; purpose of taking medications; importance of follow up care/appointments; and symptoms to watch out for once discharged home  Pt admitted with sepsis, respiratory failure  Pt had some vomiting at home believes he ate something bad  Currently feeling fine  Possible aspiration  Pt discharging home on antibiotics  No other discharge needs noted  Nurse to review AVS, all follow up providers listed  Wife to transport home

## 2022-10-03 NOTE — OCCUPATIONAL THERAPY NOTE
Occupational Therapy         Patient Name: Abelardo Cast  WHRTF'Q Date: 10/3/2022           10/03/22 7713   OT Last Visit   OT Visit Date 10/03/22   Note Type   Note type Cancelled Session   Additional Comments pt is known to this department; PT spoke with pt on 10-2 and pt expresses no need for therapy services during hospital admission; pt performs at (I) level with no needs currently; will d/c OT orders at this time       [facility]

## 2022-10-03 NOTE — ASSESSMENT & PLAN NOTE
History of renal transplant, now has end-stage renal disease due to failed renal transplant is on hemodialysis Tuesday Thursday Saturday    Continue routine outpatient dialysis schedule

## 2022-10-03 NOTE — ASSESSMENT & PLAN NOTE
· Secondary to suspected aspiration event  · Resolved  · Patient currently doing well off oxygen  · Will discharge on oral antibiotics to complete course of therapy for aspiration pneumonia

## 2022-10-03 NOTE — DISCHARGE SUMMARY
[address] [facility] [address]  Discharge- Miller Number 1970, 45 y o  male MRN: [id number]  Unit/Bed#: 407-01 Encounter: [id number]  Primary Care Provider: Shereen Negrete DO   Date and time admitted to hospital: 10/1/2022  2:55 PM    Sepsis [facility])  Assessment & Plan  · Secondary to suspected aspiration event  · Procalcitonin elevated  · Cultures have been negative to date  · Patient clinically improving with IV antibiotics  · Will transition to oral antibiotics for discharge  Given concern for aspiration will discharge on cefdinir/Flagyl to complete course of therapy  · Advised to follow-up with PCP as outpatient  · Patient did receive IV fluid resuscitation on admission    * Acute respiratory failure with hypoxia ([facility] [address] )  Assessment & Plan  · Secondary to suspected aspiration event  · Resolved  · Patient currently doing well off oxygen  · Will discharge on oral antibiotics to complete course of therapy for aspiration pneumonia    Vasovagal syncope  Assessment & Plan  Patient had several episodes of vasovagal syncope prior to admission, short episodes, no evidence of seizure activity, no postictal     Treat underlying process as above, suspect combination of acute infection, volume depletion  Gastroenteritis  Assessment & Plan  Resolved  Diarrhea  Assessment & Plan  Resolved  No need for stool studies    Immunosuppression [facility])  Assessment & Plan  Chronic immunosuppression due to history of renal transplant  Renal transplant, status post  Assessment & Plan  History of renal transplant, now has end-stage renal disease due to failed renal transplant is on hemodialysis Tuesday Thursday Saturday    Continue routine outpatient dialysis schedule    Type 1 diabetes mellitus [facility])  Assessment & Plan  Lab Results   Component Value Date    HGBA1C 7 4 (H) 08/21/2022       Recent Labs     10/02/22  1615 10/02/22  1728 10/02/22  2116 10/03/22  0715   POCGLU 276* 307* 229* 98       Blood Sugar Average: Last 72 hrs:  (P) 427 [id number]   · Continue home diabetic regimen  · Follow-up with PCP as outpatient for further management  · Patient requested prescription for glucagon as he ran out  Prescription provided on discharge      Discharging Physician / Practitioner: Matt Westbrook  PCP: Chandler Buitrago DO  Admission Date:   Admission Orders (From admission, onward)     Ordered        10/01/22 1816  INPATIENT ADMISSION  Once                      Discharge Date: 10/03/22    Medical Problems             Resolved Problems  Date Reviewed: 10/2/2022   None                 Consultations During Hospital Stay:  · Nephrology    Procedures Performed:   · None    Significant Findings / Test Results:   · Aspiration/gastroenteritis    Incidental Findings:   · None     Test Results Pending at Discharge (will require follow up): · None     Outpatient Tests Requested:  · Routine labs with PCP as outpatient    Complications:     None    Reason for Admission:  Aspiration/gastroenteritis    Hospital Course:     Flaco Lau is a 45 y o  male patient who originally presented to the hospital on 10/1/2022 due to shortness of breath  Patient with nausea/vomiting/diarrhea which patient attributes to a meal from [employer] he had prior to admission  Patient also with significant hypoxia on admission requiring non-rebreather briefly  Patient was started on antibiotics for possible aspiration event  Patient was weaned from oxygen  GI symptoms resolved within 24 hours  Patient was dialyzed based on his outpatient schedule of Tuesday Thursday Saturday  Patient received dialysis on 10/01/2022  Patient doing well today  Currently not requiring oxygen  Denies any further diarrhea  No abdominal pain  Tolerating diet  Currently afebrile  Cultures have been negative  Patient will be discharged on oral antibiotics  Outpatient follow-up with PCP  Continue outpatient dialysis schedule, next session tomorrow      Please see above list of diagnoses and related plan for additional information  Condition at Discharge: stable     Discharge Day Visit / Exam:     Subjective:  No complaints at this time  Patient requesting to be discharged    Vitals: Blood Pressure: 131/62 (10/03/22 0600)  Pulse: 90 (10/03/22 0600)  Temperature: 98 9 °F (37 2 °C) (10/03/22 0713)  Temp Source: Oral (10/03/22 0713)  Respirations: 18 (10/03/22 0600)  Height: 5' 4" (162 6 cm) (10/01/22 1910)  Weight - Scale: 52 7 kg (116 lb 2 9 oz) (10/03/22 0551)  SpO2: 95 % (10/03/22 0600)     Exam:   Physical Exam  Constitutional:       General: He is not in acute distress  HENT:      Head: Normocephalic and atraumatic  Nose: Nose normal       Mouth/Throat:      Mouth: Mucous membranes are moist    Eyes:      Extraocular Movements: Extraocular movements intact  Conjunctiva/sclera: Conjunctivae normal    Cardiovascular:      Rate and Rhythm: Normal rate and regular rhythm  Pulmonary:      Effort: Pulmonary effort is normal  No respiratory distress  Abdominal:      Palpations: Abdomen is soft  Tenderness: There is no abdominal tenderness  Musculoskeletal:         General: Normal range of motion  Cervical back: Normal range of motion and neck supple  Comments: Right lower extremity amputation noted   Skin:     General: Skin is warm and dry  Neurological:      General: No focal deficit present  Mental Status: He is alert  Cranial Nerves: No cranial nerve deficit  Psychiatric:         Mood and Affect: Mood normal          Behavior: Behavior normal          Discussion with Family:  Updated patient regarding discharge plan    Discharge instructions/Information to patient and family:   See after visit summary for information provided to patient and family  Provisions for Follow-Up Care:  See after visit summary for information related to follow-up care and any pertinent home health orders        Disposition: Home      Planned Readmission:    no     Discharge Statement:  I spent 35 minutes discharging the patient  This time was spent on the day of discharge  I had direct contact with the patient on the day of discharge  Greater than 50% of the total time was spent examining patient, answering all patient questions, arranging and discussing plan of care with patient as well as directly providing post-discharge instructions  Additional time then spent on discharge activities  Discharge Medications:  See after visit summary for reconciled discharge medications provided to patient and family        ** Please Note: This note has been constructed using a voice recognition system **

## 2022-10-03 NOTE — PLAN OF CARE
Problem: Potential for Falls  Goal: Patient will remain free of falls  Description: INTERVENTIONS:  - Educate patient/family on patient safety including physical limitations  - Instruct patient to call for assistance with activity   - Consult OT/PT to assist with strengthening/mobility   - Keep Call bell within reach  - Keep bed low and locked with side rails adjusted as appropriate  - Keep care items and personal belongings within reach  - Initiate and maintain comfort rounds  - Make Fall Risk Sign visible to staff  - Offer Toileting every 2 Hours, in advance of need  - Initiate/Maintain bed/chair alarm  - Obtain necessary fall risk management equipment: alarms  - Apply yellow socks and bracelet for high fall risk patients  - Consider moving patient to room near nurses station  Outcome: Progressing     Problem: PAIN - ADULT  Goal: Verbalizes/displays adequate comfort level or baseline comfort level  Description: Interventions:  - Encourage patient to monitor pain and request assistance  - Assess pain using appropriate pain scale  - Administer analgesics based on type and severity of pain and evaluate response  - Implement non-pharmacological measures as appropriate and evaluate response  - Consider cultural and social influences on pain and pain management  - Notify physician/advanced practitioner if interventions unsuccessful or patient reports new pain  Outcome: Progressing     Problem: INFECTION - ADULT  Goal: Absence of fever/infection during neutropenic period  Description: INTERVENTIONS:  - Monitor WBC    Outcome: Progressing     Problem: SAFETY ADULT  Goal: Patient will remain free of falls  Description: INTERVENTIONS:  - Educate patient/family on patient safety including physical limitations  - Instruct patient to call for assistance with activity   - Consult OT/PT to assist with strengthening/mobility   - Keep Call bell within reach  - Keep bed low and locked with side rails adjusted as appropriate  - Keep care items and personal belongings within reach  - Initiate and maintain comfort rounds  - Make Fall Risk Sign visible to staff  - Offer Toileting every 2 Hours, in advance of need  - Initiate/Maintain bed/chair alarm  - Obtain necessary fall risk management equipment: alarms  - Apply yellow socks and bracelet for high fall risk patients  - Consider moving patient to room near nurses station  Outcome: Progressing  Goal: Maintain or return to baseline ADL function  Description: INTERVENTIONS:  -  Assess patient's ability to carry out ADLs; assess patient's baseline for ADL function and identify physical deficits which impact ability to perform ADLs (bathing, care of mouth/teeth, toileting, grooming, dressing, etc )  - Assess/evaluate cause of self-care deficits   - Assess range of motion  - Assess patient's mobility; develop plan if impaired  - Assess patient's need for assistive devices and provide as appropriate  - Encourage maximum independence but intervene and supervise when necessary  - Involve family in performance of ADLs  - Assess for home care needs following discharge   - Consider OT consult to assist with ADL evaluation and planning for discharge  - Provide patient education as appropriate  Outcome: Progressing  Goal: Maintains/Returns to pre admission functional level  Description: INTERVENTIONS:  - Perform BMAT or MOVE assessment daily    - Set and communicate daily mobility goal to care team and patient/family/caregiver  - Collaborate with rehabilitation services on mobility goals if consulted  - Perform Range of Motion 3 times a day  - Reposition patient every 2 hours    - Dangle patient 3 times a day  - Stand patient 4 times a day  - Ambulate patient 4 times a day  - Out of bed to chair 4 times a day   - Out of bed for meals 3 times a day  - Out of bed for toileting  - Record patient progress and toleration of activity level   Outcome: Progressing     Problem: MOBILITY - ADULT  Goal: Maintain or return to baseline ADL function  Description: INTERVENTIONS:  -  Assess patient's ability to carry out ADLs; assess patient's baseline for ADL function and identify physical deficits which impact ability to perform ADLs (bathing, care of mouth/teeth, toileting, grooming, dressing, etc )  - Assess/evaluate cause of self-care deficits   - Assess range of motion  - Assess patient's mobility; develop plan if impaired  - Assess patient's need for assistive devices and provide as appropriate  - Encourage maximum independence but intervene and supervise when necessary  - Involve family in performance of ADLs  - Assess for home care needs following discharge   - Consider OT consult to assist with ADL evaluation and planning for discharge  - Provide patient education as appropriate  Outcome: Progressing  Goal: Maintains/Returns to pre admission functional level  Description: INTERVENTIONS:  - Perform BMAT or MOVE assessment daily    - Set and communicate daily mobility goal to care team and patient/family/caregiver  - Collaborate with rehabilitation services on mobility goals if consulted  - Perform Range of Motion 3 times a day  - Reposition patient every 2 hours    - Dangle patient 3 times a day  - Stand patient 4 times a day  - Ambulate patient 4 times a day  - Out of bed to chair 4 times a day   - Out of bed for meals 3 times a day  - Out of bed for toileting  - Record patient progress and toleration of activity level   Outcome: Progressing

## 2022-10-03 NOTE — ASSESSMENT & PLAN NOTE
Lab Results   Component Value Date    HGBA1C 7 4 (H) 08/21/2022       Recent Labs     10/02/22  1615 10/02/22  1728 10/02/22  2116 10/03/22  0715   POCGLU 276* 307* 229* 98       Blood Sugar Average: Last 72 hrs:  (P) 534 [id number]   · Continue home diabetic regimen  · Follow-up with PCP as outpatient for further management  · Patient requested prescription for glucagon as he ran out    Prescription provided on discharge

## 2022-10-03 NOTE — ASSESSMENT & PLAN NOTE
Patient had several episodes of vasovagal syncope prior to admission, short episodes, no evidence of seizure activity, no postictal     Treat underlying process as above, suspect combination of acute infection, volume depletion

## 2022-10-03 NOTE — RESPIRATORY THERAPY NOTE
10/02/22 2036   Respiratory Assessment   Assessment Type Assess only   General Appearance Sleeping   O2 Device Patient sleeping in no respiratory distress   Oxygen Therapy/Pulse Ox   O2 Device None (Room air)   O2 Therapy Room air   SpO2 95 %   SpO2 Activity At Rest   $ Pulse Oximetry Spot Check Charge Completed

## 2022-10-03 NOTE — ASSESSMENT & PLAN NOTE
· Secondary to suspected aspiration event  · Procalcitonin elevated  · Cultures have been negative to date  · Patient clinically improving with IV antibiotics  · Will transition to oral antibiotics for discharge    Given concern for aspiration will discharge on cefdinir/Flagyl to complete course of therapy  · Advised to follow-up with PCP as outpatient  · Patient did receive IV fluid resuscitation on admission

## 2022-10-03 NOTE — PLAN OF CARE
Problem: PAIN - ADULT  Goal: Verbalizes/displays adequate comfort level or baseline comfort level  Description: Interventions:  - Encourage patient to monitor pain and request assistance  - Assess pain using appropriate pain scale  - Administer analgesics based on type and severity of pain and evaluate response  - Implement non-pharmacological measures as appropriate and evaluate response  - Consider cultural and social influences on pain and pain management  - Notify physician/advanced practitioner if interventions unsuccessful or patient reports new pain  Outcome: Progressing     Problem: INFECTION - ADULT  Goal: Absence or prevention of progression during hospitalization  Description: INTERVENTIONS:  - Assess and monitor for signs and symptoms of infection  - Monitor lab/diagnostic results  - Monitor all insertion sites, i e  indwelling lines, tubes, and drains  - Monitor endotracheal if appropriate and nasal secretions for changes in amount and color  - [place] appropriate cooling/warming therapies per order  - Administer medications as ordered  - Instruct and encourage patient and family to use good hand hygiene technique  - Identify and instruct in appropriate isolation precautions for identified infection/condition  Outcome: Progressing

## 2022-10-07 LAB
BACTERIA BLD CULT: NORMAL
BACTERIA BLD CULT: NORMAL

## 2022-10-10 ENCOUNTER — HOSPITAL ENCOUNTER (INPATIENT)
Facility: HOSPITAL | Age: 52
LOS: 2 days | Discharge: HOME | DRG: 177 | End: 2022-10-12
Attending: EMERGENCY MEDICINE | Admitting: FAMILY MEDICINE
Payer: MEDICARE

## 2022-10-10 ENCOUNTER — APPOINTMENT (OUTPATIENT)
Dept: RADIOLOGY | Facility: HOSPITAL | Age: 52
DRG: 177 | End: 2022-10-10
Payer: MEDICARE

## 2022-10-10 DIAGNOSIS — Z99.2 ESRD (END STAGE RENAL DISEASE) ON DIALYSIS (HCC): ICD-10-CM

## 2022-10-10 DIAGNOSIS — K86.9 PANCREATIC LESION: ICD-10-CM

## 2022-10-10 DIAGNOSIS — J96.01 ACUTE RESPIRATORY FAILURE WITH HYPOXIA (HCC): ICD-10-CM

## 2022-10-10 DIAGNOSIS — N18.6 ESRD (END STAGE RENAL DISEASE) ON DIALYSIS (HCC): ICD-10-CM

## 2022-10-10 DIAGNOSIS — J12.82 PNEUMONIA DUE TO COVID-19 VIRUS: Primary | ICD-10-CM

## 2022-10-10 DIAGNOSIS — U07.1 PNEUMONIA DUE TO COVID-19 VIRUS: Primary | ICD-10-CM

## 2022-10-10 PROBLEM — I50.32 CHRONIC DIASTOLIC CONGESTIVE HEART FAILURE (HCC): Status: ACTIVE | Noted: 2022-10-10

## 2022-10-10 LAB
ALBUMIN SERPL BCP-MCNC: 3.1 G/DL (ref 3.5–5)
ALP SERPL-CCNC: 129 U/L (ref 46–116)
ALT SERPL W P-5'-P-CCNC: 79 U/L (ref 12–78)
ANION GAP SERPL CALCULATED.3IONS-SCNC: 14 MMOL/L (ref 4–13)
APTT PPP: 40 SECONDS (ref 23–37)
APTT PPP: >210 SECONDS (ref 23–37)
AST SERPL W P-5'-P-CCNC: 61 U/L (ref 5–45)
ATRIAL RATE: 73 BPM
ATRIAL RATE: 73 BPM
BASOPHILS # BLD AUTO: 0.01 THOUSANDS/ΜL (ref 0–0.1)
BASOPHILS NFR BLD AUTO: 0 % (ref 0–1)
BILIRUB SERPL-MCNC: 0.62 MG/DL (ref 0.2–1)
BUN SERPL-MCNC: 40 MG/DL (ref 5–25)
CALCIUM ALBUM COR SERPL-MCNC: 9.2 MG/DL (ref 8.3–10.1)
CALCIUM SERPL-MCNC: 8.5 MG/DL (ref 8.3–10.1)
CHLORIDE SERPL-SCNC: 90 MMOL/L (ref 96–108)
CK SERPL-CCNC: 82 U/L (ref 39–308)
CO2 SERPL-SCNC: 23 MMOL/L (ref 21–32)
CREAT SERPL-MCNC: 7.04 MG/DL (ref 0.6–1.3)
CRP SERPL QL: 11.9 MG/L
D DIMER PPP FEU-MCNC: 2 UG/ML FEU
EOSINOPHIL # BLD AUTO: 0.05 THOUSAND/ΜL (ref 0–0.61)
EOSINOPHIL NFR BLD AUTO: 1 % (ref 0–6)
ERYTHROCYTE [DISTWIDTH] IN BLOOD BY AUTOMATED COUNT: 13.7 % (ref 11.6–15.1)
ERYTHROCYTE [DISTWIDTH] IN BLOOD BY AUTOMATED COUNT: 13.7 % (ref 11.6–15.1)
GFR SERPL CREATININE-BSD FRML MDRD: 8 ML/MIN/1.73SQ M
GLUCOSE SERPL-MCNC: 208 MG/DL (ref 65–140)
GLUCOSE SERPL-MCNC: 229 MG/DL (ref 65–140)
GLUCOSE SERPL-MCNC: 306 MG/DL (ref 65–140)
GLUCOSE SERPL-MCNC: 421 MG/DL (ref 65–140)
GLUCOSE SERPL-MCNC: 452 MG/DL (ref 65–140)
GLUCOSE SERPL-MCNC: >500 MG/DL (ref 65–140)
HCT VFR BLD AUTO: 33.8 % (ref 36.5–49.3)
HCT VFR BLD AUTO: 34.2 % (ref 36.5–49.3)
HGB BLD-MCNC: 11.7 G/DL (ref 12–17)
HGB BLD-MCNC: 11.8 G/DL (ref 12–17)
IMM GRANULOCYTES # BLD AUTO: 0.06 THOUSAND/UL (ref 0–0.2)
IMM GRANULOCYTES NFR BLD AUTO: 1 % (ref 0–2)
INR PPP: 0.96 (ref 0.84–1.19)
LACTATE SERPL-SCNC: 0.8 MMOL/L (ref 0.5–2)
LYMPHOCYTES # BLD AUTO: 1 THOUSANDS/ΜL (ref 0.6–4.47)
LYMPHOCYTES NFR BLD AUTO: 20 % (ref 14–44)
MCH RBC QN AUTO: 34.7 PG (ref 26.8–34.3)
MCH RBC QN AUTO: 35.5 PG (ref 26.8–34.3)
MCHC RBC AUTO-ENTMCNC: 34.2 G/DL (ref 31.4–37.4)
MCHC RBC AUTO-ENTMCNC: 34.9 G/DL (ref 31.4–37.4)
MCV RBC AUTO: 102 FL (ref 82–98)
MCV RBC AUTO: 102 FL (ref 82–98)
MONOCYTES # BLD AUTO: 0.34 THOUSAND/ΜL (ref 0.17–1.22)
MONOCYTES NFR BLD AUTO: 7 % (ref 4–12)
NEUTROPHILS # BLD AUTO: 3.62 THOUSANDS/ΜL (ref 1.85–7.62)
NEUTS SEG NFR BLD AUTO: 71 % (ref 43–75)
NRBC BLD AUTO-RTO: 0 /100 WBCS
NT-PROBNP SERPL-MCNC: 8513 PG/ML
P AXIS: 58 DEGREES
P AXIS: 59 DEGREES
PLATELET # BLD AUTO: 162 THOUSANDS/UL (ref 149–390)
PLATELET # BLD AUTO: 189 THOUSANDS/UL (ref 149–390)
PMV BLD AUTO: 8.9 FL (ref 8.9–12.7)
PMV BLD AUTO: 9.5 FL (ref 8.9–12.7)
POTASSIUM SERPL-SCNC: 3.7 MMOL/L (ref 3.5–5.3)
PR INTERVAL: 136 MS
PR INTERVAL: 136 MS
PROCALCITONIN SERPL-MCNC: 2.28 NG/ML
PROT SERPL-MCNC: 7.1 G/DL (ref 6.4–8.4)
PROTHROMBIN TIME: 12.9 SECONDS (ref 11.6–14.5)
QRS AXIS: 14 DEGREES
QRS AXIS: 18 DEGREES
QRSD INTERVAL: 92 MS
QRSD INTERVAL: 94 MS
QT INTERVAL: 438 MS
QT INTERVAL: 442 MS
QTC INTERVAL: 482 MS
QTC INTERVAL: 486 MS
RBC # BLD AUTO: 3.32 MILLION/UL (ref 3.88–5.62)
RBC # BLD AUTO: 3.37 MILLION/UL (ref 3.88–5.62)
SARS-COV-2 RNA RESP QL NAA+PROBE: POSITIVE
SODIUM SERPL-SCNC: 127 MMOL/L (ref 135–147)
T WAVE AXIS: 78 DEGREES
T WAVE AXIS: 83 DEGREES
TACROLIMUS BLD-MCNC: 7.8 NG/ML (ref 2–20)
VENTRICULAR RATE: 73 BPM
VENTRICULAR RATE: 73 BPM
WBC # BLD AUTO: 5.08 THOUSAND/UL (ref 4.31–10.16)
WBC # BLD AUTO: 5.3 THOUSAND/UL (ref 4.31–10.16)

## 2022-10-10 PROCEDURE — 71045 X-RAY EXAM CHEST 1 VIEW: CPT

## 2022-10-10 PROCEDURE — U0003 INFECTIOUS AGENT DETECTION BY NUCLEIC ACID (DNA OR RNA); SEVERE ACUTE RESPIRATORY SYNDROME CORONAVIRUS 2 (SARS-COV-2) (CORONAVIRUS DISEASE [COVID-19]), AMPLIFIED PROBE TECHNIQUE, MAKING USE OF HIGH THROUGHPUT TECHNOLOGIES AS DESCRIBED BY CMS-2020-01-R: HCPCS

## 2022-10-10 PROCEDURE — 99223 1ST HOSP IP/OBS HIGH 75: CPT | Performed by: INTERNAL MEDICINE

## 2022-10-10 PROCEDURE — 84145 PROCALCITONIN (PCT): CPT | Performed by: EMERGENCY MEDICINE

## 2022-10-10 PROCEDURE — 90682 RIV4 VACC RECOMBINANT DNA IM: CPT | Performed by: FAMILY MEDICINE

## 2022-10-10 PROCEDURE — 99285 EMERGENCY DEPT VISIT HI MDM: CPT

## 2022-10-10 PROCEDURE — 82948 REAGENT STRIP/BLOOD GLUCOSE: CPT

## 2022-10-10 PROCEDURE — 93010 ELECTROCARDIOGRAM REPORT: CPT | Performed by: INTERNAL MEDICINE

## 2022-10-10 PROCEDURE — 85730 THROMBOPLASTIN TIME PARTIAL: CPT | Performed by: FAMILY MEDICINE

## 2022-10-10 PROCEDURE — 85730 THROMBOPLASTIN TIME PARTIAL: CPT

## 2022-10-10 PROCEDURE — 99223 1ST HOSP IP/OBS HIGH 75: CPT | Performed by: FAMILY MEDICINE

## 2022-10-10 PROCEDURE — 87081 CULTURE SCREEN ONLY: CPT | Performed by: FAMILY MEDICINE

## 2022-10-10 PROCEDURE — XW033E5 INTRODUCTION OF REMDESIVIR ANTI-INFECTIVE INTO PERIPHERAL VEIN, PERCUTANEOUS APPROACH, NEW TECHNOLOGY GROUP 5: ICD-10-PCS | Performed by: FAMILY MEDICINE

## 2022-10-10 PROCEDURE — 82550 ASSAY OF CK (CPK): CPT | Performed by: EMERGENCY MEDICINE

## 2022-10-10 PROCEDURE — 83605 ASSAY OF LACTIC ACID: CPT | Performed by: EMERGENCY MEDICINE

## 2022-10-10 PROCEDURE — 87040 BLOOD CULTURE FOR BACTERIA: CPT | Performed by: EMERGENCY MEDICINE

## 2022-10-10 PROCEDURE — 94760 N-INVAS EAR/PLS OXIMETRY 1: CPT

## 2022-10-10 PROCEDURE — 80197 ASSAY OF TACROLIMUS: CPT

## 2022-10-10 PROCEDURE — 96374 THER/PROPH/DIAG INJ IV PUSH: CPT

## 2022-10-10 PROCEDURE — 83880 ASSAY OF NATRIURETIC PEPTIDE: CPT | Performed by: EMERGENCY MEDICINE

## 2022-10-10 PROCEDURE — 99285 EMERGENCY DEPT VISIT HI MDM: CPT | Performed by: EMERGENCY MEDICINE

## 2022-10-10 PROCEDURE — 93005 ELECTROCARDIOGRAM TRACING: CPT

## 2022-10-10 PROCEDURE — 85025 COMPLETE CBC W/AUTO DIFF WBC: CPT | Performed by: EMERGENCY MEDICINE

## 2022-10-10 PROCEDURE — 85379 FIBRIN DEGRADATION QUANT: CPT | Performed by: EMERGENCY MEDICINE

## 2022-10-10 PROCEDURE — 85027 COMPLETE CBC AUTOMATED: CPT

## 2022-10-10 PROCEDURE — G0008 ADMIN INFLUENZA VIRUS VAC: HCPCS | Performed by: FAMILY MEDICINE

## 2022-10-10 PROCEDURE — 94664 DEMO&/EVAL PT USE INHALER: CPT

## 2022-10-10 PROCEDURE — 86140 C-REACTIVE PROTEIN: CPT | Performed by: EMERGENCY MEDICINE

## 2022-10-10 PROCEDURE — 85610 PROTHROMBIN TIME: CPT

## 2022-10-10 PROCEDURE — 36415 COLL VENOUS BLD VENIPUNCTURE: CPT | Performed by: EMERGENCY MEDICINE

## 2022-10-10 PROCEDURE — 5A1D70Z PERFORMANCE OF URINARY FILTRATION, INTERMITTENT, LESS THAN 6 HOURS PER DAY: ICD-10-PCS | Performed by: FAMILY MEDICINE

## 2022-10-10 PROCEDURE — 80053 COMPREHEN METABOLIC PANEL: CPT | Performed by: EMERGENCY MEDICINE

## 2022-10-10 PROCEDURE — U0005 INFEC AGEN DETEC AMPLI PROBE: HCPCS

## 2022-10-10 RX ORDER — HEPARIN SODIUM 1000 [USP'U]/ML
4000 INJECTION, SOLUTION INTRAVENOUS; SUBCUTANEOUS
Status: DISCONTINUED | OUTPATIENT
Start: 2022-10-10 | End: 2022-10-12 | Stop reason: HOSPADM

## 2022-10-10 RX ORDER — NIFEDIPINE 30 MG/1
30 TABLET, EXTENDED RELEASE ORAL DAILY
Status: DISCONTINUED | OUTPATIENT
Start: 2022-10-10 | End: 2022-10-12 | Stop reason: HOSPADM

## 2022-10-10 RX ORDER — PANTOPRAZOLE SODIUM 40 MG/1
40 TABLET, DELAYED RELEASE ORAL DAILY
Status: DISCONTINUED | OUTPATIENT
Start: 2022-10-10 | End: 2022-10-12 | Stop reason: HOSPADM

## 2022-10-10 RX ORDER — HEPARIN SODIUM 1000 [USP'U]/ML
4000 INJECTION, SOLUTION INTRAVENOUS; SUBCUTANEOUS ONCE
Status: COMPLETED | OUTPATIENT
Start: 2022-10-10 | End: 2022-10-10

## 2022-10-10 RX ORDER — ALBUTEROL SULFATE 2.5 MG/3ML
2.5 SOLUTION RESPIRATORY (INHALATION) EVERY 6 HOURS PRN
Status: DISCONTINUED | OUTPATIENT
Start: 2022-10-10 | End: 2022-10-12 | Stop reason: HOSPADM

## 2022-10-10 RX ORDER — AZATHIOPRINE 50 MG/1
50 TABLET ORAL DAILY
Status: DISCONTINUED | OUTPATIENT
Start: 2022-10-10 | End: 2022-10-12 | Stop reason: HOSPADM

## 2022-10-10 RX ORDER — HEPARIN SODIUM 1000 [USP'U]/ML
2000 INJECTION, SOLUTION INTRAVENOUS; SUBCUTANEOUS
Status: DISCONTINUED | OUTPATIENT
Start: 2022-10-10 | End: 2022-10-12 | Stop reason: HOSPADM

## 2022-10-10 RX ORDER — TACROLIMUS 1 MG/1
1 CAPSULE ORAL
Status: DISCONTINUED | OUTPATIENT
Start: 2022-10-10 | End: 2022-10-12 | Stop reason: HOSPADM

## 2022-10-10 RX ORDER — ONDANSETRON 2 MG/ML
4 INJECTION INTRAMUSCULAR; INTRAVENOUS EVERY 6 HOURS PRN
Status: DISCONTINUED | OUTPATIENT
Start: 2022-10-10 | End: 2022-10-12 | Stop reason: HOSPADM

## 2022-10-10 RX ORDER — INSULIN GLARGINE 100 [IU]/ML
10 INJECTION, SOLUTION SUBCUTANEOUS EVERY 12 HOURS SCHEDULED
Status: DISCONTINUED | OUTPATIENT
Start: 2022-10-10 | End: 2022-10-11

## 2022-10-10 RX ORDER — TACROLIMUS 1 MG/1
2 CAPSULE ORAL DAILY
Status: DISCONTINUED | OUTPATIENT
Start: 2022-10-10 | End: 2022-10-12 | Stop reason: HOSPADM

## 2022-10-10 RX ORDER — ACETAMINOPHEN 325 MG/1
650 TABLET ORAL EVERY 6 HOURS PRN
Status: DISCONTINUED | OUTPATIENT
Start: 2022-10-10 | End: 2022-10-12 | Stop reason: HOSPADM

## 2022-10-10 RX ORDER — DEXAMETHASONE SODIUM PHOSPHATE 4 MG/ML
6 INJECTION, SOLUTION INTRA-ARTICULAR; INTRALESIONAL; INTRAMUSCULAR; INTRAVENOUS; SOFT TISSUE EVERY 24 HOURS
Status: DISCONTINUED | OUTPATIENT
Start: 2022-10-10 | End: 2022-10-11

## 2022-10-10 RX ORDER — HEPARIN SODIUM 10000 [USP'U]/100ML
3-30 INJECTION, SOLUTION INTRAVENOUS
Status: DISCONTINUED | OUTPATIENT
Start: 2022-10-10 | End: 2022-10-12 | Stop reason: HOSPADM

## 2022-10-10 RX ORDER — ATORVASTATIN CALCIUM 40 MG/1
80 TABLET, FILM COATED ORAL EVERY MORNING
Status: DISCONTINUED | OUTPATIENT
Start: 2022-10-10 | End: 2022-10-12 | Stop reason: HOSPADM

## 2022-10-10 RX ORDER — ISOSORBIDE MONONITRATE 30 MG/1
30 TABLET, EXTENDED RELEASE ORAL DAILY
Status: DISCONTINUED | OUTPATIENT
Start: 2022-10-10 | End: 2022-10-12 | Stop reason: HOSPADM

## 2022-10-10 RX ORDER — INSULIN LISPRO 100 [IU]/ML
1-5 INJECTION, SOLUTION INTRAVENOUS; SUBCUTANEOUS
Status: DISCONTINUED | OUTPATIENT
Start: 2022-10-10 | End: 2022-10-12 | Stop reason: HOSPADM

## 2022-10-10 RX ORDER — CARVEDILOL 3.12 MG/1
6.25 TABLET ORAL 2 TIMES DAILY WITH MEALS
Status: DISCONTINUED | OUTPATIENT
Start: 2022-10-10 | End: 2022-10-12 | Stop reason: HOSPADM

## 2022-10-10 RX ORDER — ACETAMINOPHEN 325 MG/1
975 TABLET ORAL ONCE
Status: COMPLETED | OUTPATIENT
Start: 2022-10-10 | End: 2022-10-10

## 2022-10-10 RX ORDER — INSULIN LISPRO 100 [IU]/ML
6 INJECTION, SOLUTION INTRAVENOUS; SUBCUTANEOUS ONCE
Status: COMPLETED | OUTPATIENT
Start: 2022-10-10 | End: 2022-10-10

## 2022-10-10 RX ORDER — ASPIRIN 81 MG/1
81 TABLET, CHEWABLE ORAL EVERY MORNING
Status: DISCONTINUED | OUTPATIENT
Start: 2022-10-10 | End: 2022-10-12 | Stop reason: HOSPADM

## 2022-10-10 RX ORDER — MIDODRINE HYDROCHLORIDE 5 MG/1
5 TABLET ORAL 3 TIMES DAILY PRN
Status: DISCONTINUED | OUTPATIENT
Start: 2022-10-10 | End: 2022-10-12 | Stop reason: HOSPADM

## 2022-10-10 RX ADMIN — INSULIN LISPRO 3 UNITS: 100 INJECTION, SOLUTION INTRAVENOUS; SUBCUTANEOUS at 11:45

## 2022-10-10 RX ADMIN — DESMOPRESSIN ACETATE 80 MG: 0.2 TABLET ORAL at 09:23

## 2022-10-10 RX ADMIN — CARVEDILOL 6.25 MG: 3.12 TABLET, FILM COATED ORAL at 09:23

## 2022-10-10 RX ADMIN — TACROLIMUS 1 MG: 1 CAPSULE ORAL at 21:48

## 2022-10-10 RX ADMIN — ISOSORBIDE MONONITRATE 30 MG: 30 TABLET, EXTENDED RELEASE ORAL at 09:23

## 2022-10-10 RX ADMIN — TACROLIMUS 2 MG: 1 CAPSULE ORAL at 11:40

## 2022-10-10 RX ADMIN — PANTOPRAZOLE SODIUM 40 MG: 40 TABLET, DELAYED RELEASE ORAL at 09:23

## 2022-10-10 RX ADMIN — INSULIN GLARGINE 10 UNITS: 100 INJECTION, SOLUTION SUBCUTANEOUS at 09:28

## 2022-10-10 RX ADMIN — INSULIN GLARGINE 10 UNITS: 100 INJECTION, SOLUTION SUBCUTANEOUS at 21:48

## 2022-10-10 RX ADMIN — HEPARIN SODIUM 18 UNITS/KG/HR: 10000 INJECTION, SOLUTION INTRAVENOUS at 09:46

## 2022-10-10 RX ADMIN — ASPIRIN 81 MG CHEWABLE TABLET 81 MG: 81 TABLET CHEWABLE at 09:23

## 2022-10-10 RX ADMIN — INSULIN LISPRO 2 UNITS: 100 INJECTION, SOLUTION INTRAVENOUS; SUBCUTANEOUS at 09:28

## 2022-10-10 RX ADMIN — INSULIN LISPRO 6 UNITS: 100 INJECTION, SOLUTION INTRAVENOUS; SUBCUTANEOUS at 22:16

## 2022-10-10 RX ADMIN — ACETAMINOPHEN 975 MG: 325 TABLET, FILM COATED ORAL at 05:43

## 2022-10-10 RX ADMIN — DEXAMETHASONE SODIUM PHOSPHATE 6 MG: 4 INJECTION, SOLUTION INTRAMUSCULAR; INTRAVENOUS at 05:47

## 2022-10-10 RX ADMIN — INSULIN LISPRO 5 UNITS: 100 INJECTION, SOLUTION INTRAVENOUS; SUBCUTANEOUS at 16:00

## 2022-10-10 RX ADMIN — ACETAMINOPHEN 650 MG: 325 TABLET, FILM COATED ORAL at 16:00

## 2022-10-10 RX ADMIN — NIFEDIPINE 30 MG: 30 TABLET, FILM COATED, EXTENDED RELEASE ORAL at 09:24

## 2022-10-10 RX ADMIN — HEPARIN SODIUM 4000 UNITS: 1000 INJECTION INTRAVENOUS; SUBCUTANEOUS at 09:44

## 2022-10-10 RX ADMIN — REMDESIVIR 200 MG: 100 INJECTION, POWDER, LYOPHILIZED, FOR SOLUTION INTRAVENOUS at 06:15

## 2022-10-10 RX ADMIN — AZATHIOPRINE 50 MG: 50 TABLET ORAL at 09:23

## 2022-10-10 RX ADMIN — INFLUENZA A VIRUS A/WISCONSIN/588/2019 (H1N1) RECOMBINANT HEMAGGLUTININ ANTIGEN, INFLUENZA A VIRUS A/DARWIN/6/2021 (H3N2) RECOMBINANT HEMAGGLUTININ ANTIGEN, INFLUENZA B VIRUS B/AUSTRIA/1359417/2021 RECOMBINANT HEMAGGLUTININ ANTIGEN, AND INFLUENZA B VIRUS B/PHUKET/3073/2013 RECOMBINANT HEMAGGLUTININ ANTIGEN 0.5 ML: 45; 45; 45; 45 INJECTION INTRAMUSCULAR at 14:25

## 2022-10-10 NOTE — ASSESSMENT & PLAN NOTE
· Continue Coreg 6 25 mg oral b i d   With meals  · Continue Imdur 30 mg oral daily  · Continue nifedipine 30 mg oral daily

## 2022-10-10 NOTE — RESPIRATORY THERAPY NOTE
RT Protocol Note  Uriah Almendarez 45 y o  male MRN: [id number]  Unit/Bed#: 407-01 Encounter: [id number]    Assessment    Principal Problem:    Acute on chronic respiratory failure with hypoxia ([place] [address] )  Active Problems:    Type 1 diabetes mellitus ([place] [address] )    Renal transplant, status post    Hypertension    ESRD (end stage renal disease) on dialysis [facility])    Coronary artery disease involving native coronary artery of native heart without angina pectoris    Pneumonia due to COVID-19 virus    Chronic diastolic congestive heart failure ([place] [address] )      Home Pulmonary Medications:  none       Past Medical History:   Diagnosis Date   • Bacteremia 12/21/2018   • CAD (coronary artery disease)     s/p MARC to LCx, pLAD 2/2018   • Cardiac arrest [facility])    • Chronic kidney disease    • Diabetes mellitus type 1 (HCC)    • Dialysis patient [facility])     Access in right chest   • GI bleed    • Hyperlipidemia    • Hypertension    • Infection at site of external fixator pin [facility])    • MI (myocardial infarction) ([place] [address] )    • Pneumonia     Last Assessed [id number]   • Renal failure    • Renal transplant, status post 07/21/2007     Social History     Socioeconomic History   • Marital status: /Civil Union     Spouse name: None   • Number of children: None   • Years of education: None   • Highest education level: None   Occupational History   • None   Tobacco Use   • Smoking status: Never Smoker   • Smokeless tobacco: Never Used   Vaping Use   • Vaping Use: Never used   Substance and Sexual Activity   • Alcohol use: Not Currently     Alcohol/week: 0 0 standard drinks   • Drug use: Never   • Sexual activity: Yes     Partners: Female   Other Topics Concern   • None   Social History Narrative    No living will     Social Determinants of Health     Financial Resource Strain: Not on file   Food Insecurity: No Food Insecurity   • Worried About Running Out of Food in the Last Year: Never true   • Ran Out of Food in the Last Year: Never true Transportation Needs: No Transportation Needs   • Lack of Transportation (Medical): No   • Lack of Transportation (Non-Medical): No   Physical Activity: Not on file   Stress: Not on file   Social Connections: Not on file   Intimate Partner Violence: Not on file   Housing Stability: Low Risk    • Unable to Pay for Housing in the Last Year: No   • Number of Places Lived in the Last Year: 1   • Unstable Housing in the Last Year: No       Subjective  Feels good       Objective    Physical Exam:   Assessment Type: Assess only  General Appearance: Alert, Awake  Respiratory Pattern: Symmetrical, Spontaneous  Chest Assessment: Chest expansion symmetrical, Trachea midline  Bilateral Breath Sounds: Diminished (posteriorly)  Cough: None  O2 Device: 3 l/m nc    Vitals:  Blood pressure 122/57, pulse 85, temperature 98 4 °F (36 9 °C), temperature source Oral, resp  rate 18, height 5' 4" (1 626 m), SpO2 95 %  Imaging and other studies: I have personally reviewed pertinent reports        O2 Device: 3 l/m nc     Plan  Oxygen therapy 3 l/m nc with titration to room air as tolerated, flutter valve for mobilization of secretions  Due to pneumonia, Q4 prn MDI ventolin  For SOB and wheezing           Resp Comments: pt states he fells good, pt weaned to 3 l/m nc, will cont to titrate as tolerated, pt doesn't take resp meds at home, no wheezing at present time, will put in ventolin MDI Q4 prn for SOB wheezing

## 2022-10-10 NOTE — ASSESSMENT & PLAN NOTE
· Patient presents with 1 day of increased shortness of breath, malaise  His wife tested positive on Saturday for [place]    He tested positive on home COVID test today  · Will repeat COVID test  · White blood cells within normal limits  · Lactic acid within normal limits  · Procalcitonin elevated at 2 28  · CRP elevated at 11 9  · D-dimer elevated at 2 20  · Chest x-ray does appear to show patchy infiltrates  · Initiated on Decadron 6 mg IV Q 24  · Initiate patient on remdesivir  · Due to elevated D-dimer, will initiate on VTE/PE heparin drip  · Blood cultures drawn

## 2022-10-10 NOTE — H&P
[facility] [address] [place]  H&P- Lenox Counts 1970, 45 y o  male MRN: [id number]  Unit/Bed#: RM07 Encounter: [id number]  Primary Care Provider: Kelly Velazquez DO   Date and time admitted to hospital: 10/10/2022  4:58 AM    * Acute on chronic respiratory failure with hypoxia ([address] )  Assessment & Plan  · Patient recently tested positive for COVID-19 on a home COVID test   Reports increased shortness of breath, malaise over the past day  · Patient currently requiring 4 L nasal cannula oxygen and saturating at 93%  · Please see assessment and plan for pneumonia due to COVID-19 virus  · Continue nasal cannula oxygen as needed to maintain O2 sats above 92%  · Respiratory protocol    Pneumonia due to COVID-19 virus  Assessment & Plan  · Patient presents with 1 day of increased shortness of breath, malaise  His wife tested positive on Saturday for [place]    He tested positive on home COVID test today  · Will repeat COVID test  · White blood cells within normal limits  · Lactic acid within normal limits  · Procalcitonin elevated at 2 28  · CRP elevated at 11 9  · D-dimer elevated at 2 20  · Chest x-ray does appear to show patchy infiltrates  · Initiated on Decadron 6 mg IV Q 24  · Initiate patient on remdesivir  · Due to elevated D-dimer, will initiate on VTE/PE heparin drip  · Blood cultures drawn    Chronic diastolic congestive heart failure (HCC)  Assessment & Plan  · Wt Readings from Last 3 Encounters:   10/03/22 52 7 kg (116 lb 2 9 oz)   08/24/22 52 2 kg (115 lb)   02/10/21 40 3 kg (88 lb 13 5 oz)     · Does not appear to be in acute exacerbation at this time  · ProBNP 8500 which is below patient's recent values above 15,000  · Chest x-ray does not appear to show any evidence of pulmonary volume overload  · Continue Coreg 6 25 mg oral b i d   · Continue to monitor volume status closely        Coronary artery disease involving native coronary artery of native heart without angina pectoris  Assessment & Plan  · Continue Coreg 6 25 mg oral b i d  With meals  · Continue Imdur 30 mg oral daily  · Continue nifedipine 30 mg oral daily    ESRD (end stage renal disease) on dialysis [facility])  Assessment & Plan  Lab Results   Component Value Date    EGFR 7 10/03/2022    EGFR 11 10/02/2022    EGFR 15 10/01/2022    CREATININE 7 63 (H) 10/03/2022    CREATININE 5 14 (H) 10/02/2022    CREATININE 4 18 (H) 10/01/2022   · 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  · Undergoes dialysis Tuesday Thursday Saturday  · Nephrology consult  · Continue Tacrolimus 2 mg in morning 1 mg at night  · Check tacro level    Hypertension  Assessment & Plan  · Current blood pressure is 124/60  · Continue Coreg 6 25 mg oral b i d   · Continue nifedipine 30 mg oral daily    Renal transplant, status post  Assessment & Plan  · Patient has history of failed renal transplant now with ESRD  Currently on Tuesday Thursday Saturday dialysis  · Continue dialysis schedule  · Nephrology consult  · Check tacrolimus level  · Continue home dose of tacrolimus 2 capsules every morning 1 capsule at bedtime    Type 1 diabetes mellitus ([facility] [address] )  Assessment & Plan    Lab Results   Component Value Date    HGBA1C 7 4 (H) 08/21/2022   · Blood glucose on admission 208  · Continue home glargine 10 units subQ q 12  · Sliding scale insulin  · A c  HS fingerstick glucose checks      VTE Pharmacologic Prophylaxis: VTE Score: 6 High Risk (Score >/= 5) - Pharmacological DVT Prophylaxis Ordered: heparin drip  Sequential Compression Devices Ordered  Code Status: Prior Level 1 full code  Discussion with family: Patient declined call to   Anticipated Length of Stay: Patient will be admitted on an inpatient basis with an anticipated length of stay of greater than 2 midnights secondary to Acute respiratory failure with hypoxia, Covid 19 Pneumonia        Total Time for Visit, including Counseling / Coordination of Care: 60 minutes Greater than 50% of this total time spent on direct patient counseling and coordination of care  Chief Complaint: Shortness of breath     History of Present Illness:  Rob Rodriguez is a 45 y o  male with a PMH of CAD, CHF, type 2 diabetes, ESRD on dialysis, hypertension, hyperlipidemia who presents with increased shortness of breath over the past 24 hours  Patient states that on Saturday of this weekend, his wife tested positive for COVID-19  He states that over the past day, he has felt increasingly malaise and has had a nonproductive cough shortness of breath  He also reports a mild headache  He tested himself for COVID-19 at home which tested positive  Has any fevers, chills, chest pain, nausea, vomiting, diarrhea, abdominal pain  The patient was recently admitted here for pneumonia thought to be caused by an aspiration event  Patient states that his shortness of breath is made better with rest   He does however state that he does have some shortness of breath at rest   Denies any orthopnea, paroxysmal nocturnal dyspnea  Upon arrival to ED, patient was placed on 4 L nasal cannula  He is currently saturating at 93% on 4 L nasal cannula  Review of Systems:  Review of Systems   Constitutional: Positive for fatigue  Negative for chills and fever  HENT: Negative for ear pain and sore throat  Eyes: Negative for pain and visual disturbance  Respiratory: Positive for shortness of breath  Negative for cough and wheezing  Cardiovascular: Negative for chest pain, palpitations and leg swelling  Gastrointestinal: Negative for abdominal distention, abdominal pain, diarrhea, nausea and vomiting  Endocrine: Negative for polydipsia and polyuria  Genitourinary: Negative for dysuria and hematuria  Musculoskeletal: Negative for arthralgias and back pain  Skin: Negative for color change and rash  Neurological: Negative for dizziness, seizures, syncope, facial asymmetry, weakness, light-headedness, numbness and headaches  Psychiatric/Behavioral: Negative for agitation and confusion  All other systems reviewed and are negative  Past Medical and Surgical History:   Past Medical History:   Diagnosis Date   • Bacteremia 12/21/2018   • CAD (coronary artery disease)     s/p MARC to LCx, pLAD 2/2018   • Cardiac arrest [facility])    • Chronic kidney disease    • Diabetes mellitus type 1 ([facility] [address] )    • Dialysis patient [facility])     Access in right chest   • GI bleed    • Hyperlipidemia    • Hypertension    • Infection at site of external fixator pin [facility])    • MI (myocardial infarction) ([facility] [address] )    • Pneumonia     Last Assessed [id number]   • Renal failure    • Renal transplant, status post 07/21/2007       Past Surgical History:   Procedure Laterality Date   • AMPUTATION Right 02/2019    aboce knee   • ANKLE FRACTURE SURGERY     • ANKLE HARDWARE REMOVAL Right 7/31/2017    Procedure: REMOVAL HARDWARE ANKLE;  Surgeon: Leslye Mishra MD;  Location: [facility] MAIN [facility];  Service: Orthopedics   • ANKLE HARDWARE REMOVAL Right 8/17/2017    Procedure: TIBIA FAILED HARDWARE REMOVAL;  Surgeon: Leslye Mishra MD;  Location: [facility];  Service: Orthopedics   • CLOSED REDUCTION ANKLE Right 7/3/2017    Procedure: CLOSED REDUCTION DISTAL TIB-FIB AND CASTING VS;  Surgeon: Leslye Mishra MD;  Location: [facility] MA[facility];  Service: Orthopedics   • CORONARY ANGIOPLASTY WITH STENT PLACEMENT  02/2018    CAD s/p MARC to LCx, pLAD   • ESOPHAGOGASTRODUODENOSCOPY N/A 12/20/2018    Procedure: ESOPHAGOGASTRODUODENOSCOPY (EGD) in ICU; Surgeon: Annalisa Hastings MD;  Location: [facility];   Service: Gastroenterology   • EYE SURGERY     • FRACTURE SURGERY      ORIF Rt Ankle   • GLUTAMIC ACID DECARBOXYLASE (HISTORICAL)     • IR AV FISTULAGRAM/GRAFTOGRAM  4/16/2020   • IR PICC LINE  8/20/2018   • IR TUNNELED DIALYSIS CATHETER CHECK/CHANGE/REPOSITION/ANGIOPLASTY  1/8/2020   • IR TUNNELED DIALYSIS CATHETER PLACEMENT  10/21/2019   • IR TUNNELED DIALYSIS CATHETER REMOVAL  5/29/2020   • IR VENOUS LINE REMOVAL  10/5/2018   • LEG AMPUTATION Right 02/01/2019    Above the knee   • NEPHRECTOMY TRANSPLANTED ORGAN     • PA ANASTOMOSIS,AV,ANY SITE Right 2/11/2020    Procedure: CREATION FISTULA ARTERIOVENOUS (AV) right upper extremity;  Surgeon: Nella Dumas MD;  Location: [address] [place] [address] MAIN OR;  Service: Vascular   • PA OPEN TREATMENT FRACTURE DISTAL TIBIA FIBULA Right 7/3/2017    Procedure: OPEN REDUCTION W/ INTERNAL FIXATION (ORIF); Surgeon: Tabatha Foley MD;  Location: [facility];  Service: Orthopedics   • TOE AMPUTATION Right 10/27/2016    Procedure: AMPUTATION TOE;  Surgeon: Ninoska Alanis DPM;  Location: [facility] MA[facility];  Service:        Meds/Allergies:  Prior to Admission medications    Medication Sig Start Date End Date Taking?  Authorizing Provider   aspirin 81 mg chewable tablet Chew 81 mg every morning     Historical Provider, MD   atorvastatin (LIPITOR) 80 mg tablet Take 1 tablet (80 mg total) by mouth every morning 10/12/21   GAVINO Vela   azaTHIOprine (IMURAN) 50 mg tablet Take 1 tablet (50 mg total) by mouth daily 6/21/22   GAVINO Vela   carvedilol (COREG) 6 25 mg tablet Take 1 tablet (6 25 mg total) by mouth 2 (two) times a day with meals 7/5/22   GAVINO Vela   cinacalcet (SENSIPAR) 30 mg tablet Take 1 tablet (30 mg total) by mouth daily  Patient not taking: Reported on 9/30/2022 3/7/22   GAVINO Vela   Glucagon, rDNA, (Glucagon Emergency) 1 MG KIT INJECT 1MG SUBCUTANEOUSLY ONCE AS NEEDED FOR LOW BLOOD SUGAR FOR UP TO 1 DOSE 10/3/22   Biagio Closs, DO   glucose blood test strip Use as instructed  Patient not taking: Reported on 9/30/2022 9/17/21   Bhavna Padilla,    insulin aspart (NovoLOG) 100 units/mL injection Inject 10 Units under the skin 3 (three) times a day with meals  Patient taking differently: Inject 1-10 Units under the skin 3 (three) times a day with meals Pt uses Sliding scale at home 6/30/22   GAVINO Vela   insulin glargine (Semglee) 100 units/mL subcutaneous injection Inject 10 Units under the skin every 12 (twelve) hours 5/3/22   GAVINO Valerio   Insulin Syringe-Needle U-100 31G X 15/64" 0 5 ML MISC Use 3 (three) times a day 3/10/22   GAVINO Valerio   isosorbide mononitrate (IMDUR) 30 mg 24 hr tablet Take 1 tablet (30 mg total) by mouth daily 2/10/22   Anya Hill PA-C   midodrine (PROAMATINE) 5 mg tablet Take 5 mg by mouth 3 (three) times a day as needed    Historical Provider, MD   NIFEdipine (PROCARDIA XL) 30 mg 24 hr tablet Take 1 tablet (30 mg total) by mouth daily 1/11/22   GAVINO Valerio   pantoprazole (PROTONIX) 40 mg tablet Take 40 mg by mouth daily    Historical Provider, MD   predniSONE 5 mg tablet Take 1 tablet (5 mg total) by mouth daily 10/12/21   GAVINO Valerio   sevelamer carbonate (RENVELA) 800 mg tablet Take 800 mg by mouth as needed    Historical Provider, MD   tacrolimus (PROGRAF) 1 mg capsule TAKE 2 CAPSULES BY MOUTH EVERY MORNING AND ONE CAPSULE EVERY DAY AT BEDTIME 6/28/22   GAVINO Valerio     I have reviewed home medications with patient personally      Allergies: No Known Allergies    Social History:  Marital Status: /Civil Union   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   Patient Pre-hospital Living Situation: Home  Patient Pre-hospital Level of Mobility: walks  Patient Pre-hospital Diet Restrictions: none   Substance Use History:   Social History     Substance and Sexual Activity   Alcohol Use Not Currently   • Alcohol/week: 0 0 standard drinks     Social History     Tobacco Use   Smoking Status Never Smoker   Smokeless Tobacco Never Used     Social History     Substance and Sexual Activity   Drug Use Never       Family History:  Family History   Problem Relation Age of Onset   • Diabetes Brother    • Coronary artery disease Mother    • No Known Problems Father        Physical Exam:     Vitals:   Blood Pressure: 124/60 (10/10/22 0600)  Pulse: 74 (10/10/22 0600)  Temperature: 97 7 °F (36 5 °C) (10/10/22 0502)  Temp Source: Tympanic (10/10/22 0502)  Respirations: 18 (10/10/22 0600)  SpO2: 93 % (10/10/22 0600)    Physical Exam  Vitals and nursing note reviewed  Constitutional:       General: He is not in acute distress  Appearance: Normal appearance  He is well-developed  He is ill-appearing  Comments: Chronically ill appearing      HENT:      Head: Normocephalic and atraumatic  Nose: Nose normal  No congestion or rhinorrhea  Mouth/Throat:      Mouth: Mucous membranes are moist       Pharynx: Oropharynx is clear  No oropharyngeal exudate or posterior oropharyngeal erythema  Eyes:      General: No scleral icterus  Right eye: No discharge  Left eye: No discharge  Extraocular Movements: Extraocular movements intact  Conjunctiva/sclera: Conjunctivae normal       Pupils: Pupils are equal, round, and reactive to light  Cardiovascular:      Rate and Rhythm: Normal rate and regular rhythm  Pulses: Normal pulses  Heart sounds: Normal heart sounds  No murmur heard  No friction rub  No gallop  Pulmonary:      Effort: Pulmonary effort is normal  No tachypnea or respiratory distress  Breath sounds: Examination of the right-upper field reveals rales  Examination of the right-middle field reveals rales  Examination of the left-middle field reveals rales  Examination of the right-lower field reveals rales  Examination of the left-lower field reveals rales  Rales present  No wheezing or rhonchi  Abdominal:      General: Abdomen is flat  Bowel sounds are normal  There is no distension  Palpations: Abdomen is soft  Tenderness: There is no abdominal tenderness  There is no guarding or rebound  Musculoskeletal:      Cervical back: Neck supple  Left lower leg: No edema  Comments: RLE BKA   Skin:     General: Skin is warm and dry  Capillary Refill: Capillary refill takes less than 2 seconds  Neurological:      General: No focal deficit present        Mental Status: He is alert and oriented to person, place, and time  Mental status is at baseline  Cranial Nerves: No cranial nerve deficit  Motor: No weakness  Coordination: Coordination normal    Psychiatric:         Mood and Affect: Mood normal          Behavior: Behavior normal           Additional Data:     Lab Results:  Results from last 7 days   Lab Units 10/10/22  0545   WBC Thousand/uL 5 08   HEMOGLOBIN g/dL 11 7*   HEMATOCRIT % 34 2*   PLATELETS Thousands/uL 162   NEUTROS PCT % 71   LYMPHS PCT % 20   MONOS PCT % 7   EOS PCT % 1     Results from last 7 days   Lab Units 10/10/22  0545   SODIUM mmol/L 127*   POTASSIUM mmol/L 3 7   CHLORIDE mmol/L 90*   CO2 mmol/L 23   BUN mg/dL 40*   CREATININE mg/dL 7 04*   ANION GAP mmol/L 14*   CALCIUM mg/dL 8 5   ALBUMIN g/dL 3 1*   TOTAL BILIRUBIN mg/dL 0 62   ALK PHOS U/L 129*   ALT U/L 79*   AST U/L 61*   GLUCOSE RANDOM mg/dL 208*         Results from last 7 days   Lab Units 10/03/22  0715   POC GLUCOSE mg/dl 98         Results from last 7 days   Lab Units 10/10/22  0545   LACTIC ACID mmol/L 0 8   PROCALCITONIN ng/ml 2 28*       Imaging: Reviewed radiology reports from this admission including: chest xray  XR chest portable   ED Interpretation by Inna Lynn DO (10/10 2601)   Diffuse patchy infiltrate, known COVID19          EKG and Other Studies Reviewed on Admission:   · EKG: NSR  HR 74     ** Please Note: This note has been constructed using a voice recognition system   **

## 2022-10-10 NOTE — PLAN OF CARE
Problem: PAIN - ADULT  Goal: Verbalizes/displays adequate comfort level or baseline comfort level  Description: Interventions:  - Encourage patient to monitor pain and request assistance  - Assess pain using appropriate pain scale  - Administer analgesics based on type and severity of pain and evaluate response  - Implement non-pharmacological measures as appropriate and evaluate response  - Consider cultural and social influences on pain and pain management  - Notify physician/advanced practitioner if interventions unsuccessful or patient reports new pain  Outcome: Progressing     Problem: INFECTION - ADULT  Goal: Absence or prevention of progression during hospitalization  Description: INTERVENTIONS:  - Assess and monitor for signs and symptoms of infection  - Monitor lab/diagnostic results  - Monitor all insertion sites, i e  indwelling lines, tubes, and drains  - Monitor endotracheal if appropriate and nasal secretions for changes in amount and color  - Star appropriate cooling/warming therapies per order  - Administer medications as ordered  - Instruct and encourage patient and family to use good hand hygiene technique  - Identify and instruct in appropriate isolation precautions for identified infection/condition  Outcome: Progressing  Goal: Absence of fever/infection during neutropenic period  Description: INTERVENTIONS:  - Monitor WBC    Outcome: Progressing     Problem: SAFETY ADULT  Goal: Patient will remain free of falls  Description: INTERVENTIONS:  - Educate patient/family on patient safety including physical limitations  - Instruct patient to call for assistance with activity   - Consult OT/PT to assist with strengthening/mobility   - Keep Call bell within reach  - Keep bed low and locked with side rails adjusted as appropriate  - Keep care items and personal belongings within reach  - Initiate and maintain comfort rounds  - Make Fall Risk Sign visible to staff  - Offer Toileting every 2 Hours, in advance of need  - Initiate/Maintain bed alarm  Goal: Maintain or return to baseline ADL function  Description: INTERVENTIONS:  -  Assess patient's ability to carry out ADLs; assess patient's baseline for ADL function and identify physical deficits which impact ability to perform ADLs (bathing, care of mouth/teeth, toileting, grooming, dressing, etc )  - Assess/evaluate cause of self-care deficits   - Assess range of motion  - Assess patient's mobility; develop plan if impaired  - Assess patient's need for assistive devices and provide as appropriate  - Encourage maximum independence but intervene and supervise when necessary  - Involve family in performance of ADLs  - Assess for home care needs following discharge   - Consider OT consult to assist with ADL evaluation and planning for discharge  - Provide patient education as appropriate  Outcome: Progressing  Goal: Maintains/Returns to pre admission functional level  Description: INTERVENTIONS:  - Perform BMAT or MOVE assessment daily    - Set and communicate daily mobility goal to care team and patient/family/caregiver  - Collaborate with rehabilitation services on mobility goals if consulted  - Perform Range of Motion 3 times a day  - Reposition patient every 3 hours    - Dangle patient 3 times a day  - Stand patient 3 times a day  - Ambulate patient 3 times a day  - Out of bed to chair 3 times a day   - Out of bed for meals 3 times a day  - Out of bed for toileting  - Record patient progress and toleration of activity level   Outcome: Progressing     Problem: DISCHARGE PLANNING  Goal: Discharge to home or other facility with appropriate resources  Description: INTERVENTIONS:  - Identify barriers to discharge w/patient and caregiver  - Arrange for needed discharge resources and transportation as appropriate  - Identify discharge learning needs (meds, wound care, etc )  - Arrange for interpretive services to assist at discharge as needed  - Refer to Case Management Department for coordinating discharge planning if the patient needs post-hospital services based on physician/advanced practitioner order or complex needs related to functional status, cognitive ability, or social support system  Outcome: Progressing     Problem: Knowledge Deficit  Goal: Patient/family/caregiver demonstrates understanding of disease process, treatment plan, medications, and discharge instructions  Description: Complete learning assessment and assess knowledge base    Interventions:  - Provide teaching at level of understanding  - Provide teaching via preferred learning methods  Outcome: Progressing     Problem: RESPIRATORY - ADULT  Goal: Achieves optimal ventilation and oxygenation  Description: INTERVENTIONS:  - Assess for changes in respiratory status  - Assess for changes in mentation and behavior  - Position to facilitate oxygenation and minimize respiratory effort  - Oxygen administered by appropriate delivery if ordered  - Initiate smoking cessation education as indicated  - Encourage broncho-pulmonary hygiene including cough, deep breathe, Incentive Spirometry  - Assess the need for suctioning and aspirate as needed  - Assess and instruct to report SOB or any respiratory difficulty  - Respiratory Therapy support as indicated  Outcome: Progressing

## 2022-10-10 NOTE — CONSULTS
Consultation - Nephrology   Baldev Kline 45 y o  male MRN: [id number]  Unit/Bed#: 407-01 Encounter: [id number]      A/P:  1  ESRD hemodialysis dependent   - have contacted his home clinic  Orders have been obtained for 3 hour 15 minute treatment  His estimated dry weight is 47 5 kg    - he will have a treatment tomorrow as per his usual TTS schedule    2  Type 1 diabetes mellitus   - sugars are being covered as per protocol and monitored    3  COVID-19 pneumonia   - receiving dexamethasone 6 mg IV Q 24 hours, remdesivir 100 mg Q 24 hours    4  History of living related donor transplant in 2001   - continue immunosuppressive agents Prograf 1 mg at bedtime 2 mg in the morning, azathioprine 50 mg daily    5  Primary hypertension   - blood pressure is well controlled  - continue carvedilol 6 25 twice a day with meals and nifedipine 30 mg daily    6  Acute on chronic respiratory failure with hypoxia   - comfortable on 4 liters/minute  Oxygen saturation is 95%    6  Chronic diastolic heart failure   - will be ultrafilter to his dry weight of 47 5 kg tomorrow         Thank you for allowing us to participate in the care of your patient  Please feel free to contact us regarding the care of this patient, or any other questions/concerns that may be applicable      Patient Active Problem List   Diagnosis   • Type 1 diabetes mellitus ([facility] )   • Renal transplant, status post   • Hyperkalemia   • Sepsis ([facility] )   • Osteomyelitis ([facility])   • Poor circulation   • Closed fracture of distal end of right tibia with routine healing   • Chronic osteomyelitis of tibia ([facility])   • Immunosuppression ([facility])   • Hypertension   • DKA (diabetic ketoacidoses)   • Elevated troponin   • Diarrhea   • Cellulitis of ankle   • Non-ST elevation myocardial infarction (NSTEMI), type 2   • Urinary retention   • Severe sepsis ([facility])   • Hyperphosphatemia   • Hyponatremia   • Gastrointestinal hemorrhage   • S/P AKA (above knee amputation), right ([facility] ) • Acute blood loss anemia   • Acute respiratory failure with hypoxia (HCC)   • Pulmonary hypertension (HCC)   • Chronic anemia   • Depressed left ventricular ejection fraction   • Acute pulmonary edema (HCC)   • Hx of right BKA (HCC)   • Hypertensive urgency   • ESRD (end stage renal disease) on dialysis [facility])   • Coronary artery disease involving native coronary artery of native heart without angina pectoris   • Pre-operative cardiovascular examination   • Chronic kidney disease, unspecified   • Lesion of pancreas   • Abnormal CT scan, colon   • Respiratory distress   • Community acquired pneumonia of right lower lobe of lung   • HCAP (healthcare-associated pneumonia)   • Acute on chronic respiratory failure with hypoxia (HCC)   • Elevated MCV   • Hyperbilirubinemia   • Acute on chronic diastolic CHF (congestive heart failure) ([facility])   • Elevated procalcitonin   • Mitral valve insufficiency   • Abnormal EKG   • Gastroenteritis   • Vasovagal syncope   • Pneumonia due to COVID-19 virus   • Chronic diastolic congestive heart failure ([facility] )       History of Present Illness   Physician Requesting Consult: Cherie Daniel MD  Reason for Consult / Principal Problem: ESRD hemodialysis dependent TTS at [address]  Hx and PE limited by:   HPI: Ama Travis is a 45y o  year old male who presents with ESRD hemodialysis dependent due to diabetic nephropathy and a failed kidney transplant  The patient received dialysis Saturday for 3 hours 15 min  He felt well, however, he awoke at 4 am with a sore throat and feeling ill  His wife has Covid now and he tested positive on a home test this morning  Past medical history is significant for living related donor transplant in 2001 currently on immunosuppressive medication, mineral bone disease, history of hypotension, and a history sepsis present on admission due to gastroenteritis an aspiration pneumonia requiring admission earlier this month    He has type 1 diabetes and has had a right BKA  History obtained from chart review, the patient and dialysis nurse at [facility]- Has fatigue,no  fever, chills, night sweats, weight changes  HEENT ROS-  blurred vision     Endocrine ROS- Long history diabetes mellitus no thyroid disease  Cardiovascular ROS- Denies chest pain, palpitation,has  dyspnea exertion,no  orthopnea, claudication  Pulmonary ROS- Denies history of COPD, asthma  Denies cough,has sore throathas  shortness of breath  GI ROS- Denies abdominal pain, diarrhea, nausea, swallowing problems, vomiting, constipation, blood in stools,   Hematological ROS- Denies history of easy bruising,   Genitourinary ROS-Voids clear yellow urine without change  Lymphatic ROS- Denies lymphadenopathy  Musculoskeletal ROS- Has history of muscle weakness, joint pain  Dermatological    Psychiatric ROS-   Neurological ROS- No stroke or TIA symptoms        Historical Information   Past Medical History:   Diagnosis Date   • Bacteremia 12/21/2018   • CAD (coronary artery disease)     s/p MARC to LCx, pLAD 2/2018   • Cardiac arrest [facility])    • Chronic kidney disease    • Diabetes mellitus type 1 ([facility] )    • Dialysis patient [facility])     Access in right chest   • GI bleed    • Hyperlipidemia    • Hypertension    • Infection at site of external fixator pin [facility])    • MI (myocardial infarction) ([facility] )    • Pneumonia     Last Assessed 26Feb2013   • Renal failure    • Renal transplant, status post 07/21/2007     Past Surgical History:   Procedure Laterality Date   • AMPUTATION Right 02/2019    aboce knee   • ANKLE FRACTURE SURGERY     • ANKLE HARDWARE REMOVAL Right 7/31/2017    Procedure: REMOVAL HARDWARE ANKLE;  Surgeon: Gabriella Houser MD;  Location: [facility];  Service: Orthopedics   • ANKLE HARDWARE REMOVAL Right 8/17/2017    Procedure: TIBIA FAILED HARDWARE REMOVAL;  Surgeon: Gabriella Houser MD;  Location: [facility];  Service: Orthopedics   • CLOSED REDUCTION ANKLE Right 7/3/2017    Procedure: CLOSED REDUCTION DISTAL TIB-FIB AND CASTING VS;  Surgeon: Patria Amanda MD;  Location: [facility];  Service: Orthopedics   • CORONARY ANGIOPLASTY WITH STENT PLACEMENT  02/2018    CAD s/p MARC to LCx, pLAD   • ESOPHAGOGASTRODUODENOSCOPY N/A 12/20/2018    Procedure: ESOPHAGOGASTRODUODENOSCOPY (EGD) in ICU; Surgeon: Wetzel Canavan, MD;  Location: [facility]; Service: Gastroenterology   • EYE SURGERY     • FRACTURE SURGERY      ORIF Rt Ankle   • GLUTAMIC ACID DECARBOXYLASE (HISTORICAL)     • IR AV FISTULAGRAM/GRAFTOGRAM  4/16/2020   • IR PICC LINE  8/20/2018   • IR TUNNELED DIALYSIS CATHETER CHECK/CHANGE/REPOSITION/ANGIOPLASTY  1/8/2020   • IR TUNNELED DIALYSIS CATHETER PLACEMENT  10/21/2019   • IR TUNNELED DIALYSIS CATHETER REMOVAL  5/29/2020   • IR VENOUS LINE REMOVAL  10/5/2018   • LEG AMPUTATION Right 02/01/2019    Above the knee   • NEPHRECTOMY TRANSPLANTED ORGAN     • PA ANASTOMOSIS,AV,ANY SITE Right 2/11/2020    Procedure: CREATION FISTULA ARTERIOVENOUS (AV) right upper extremity;  Surgeon: Bill Stone MD;  Location: [facility] MAIN OR;  Service: Vascular   • PA OPEN TREATMENT FRACTURE DISTAL TIBIA FIBULA Right 7/3/2017    Procedure: OPEN REDUCTION W/ INTERNAL FIXATION (ORIF);   Surgeon: Patria Amanda MD;  Location: [facility];  Service: Orthopedics   • TOE AMPUTATION Right 10/27/2016    Procedure: AMPUTATION TOE;  Surgeon: Arash Edge DPM;  Location: [facility];  Service:      Social History   Social History     Substance and Sexual Activity   Alcohol Use Not Currently   • Alcohol/week: 0 0 standard drinks     Social History     Substance and Sexual Activity   Drug Use Never     Social History     Tobacco Use   Smoking Status Never Smoker   Smokeless Tobacco Never Used     Family History   Problem Relation Age of Onset   • Diabetes Brother    • Coronary artery disease Mother    • No Known Problems Father        Meds/Allergies   all current active meds have been reviewed, current meds: Current Facility-Administered Medications   Medication Dose Route Frequency   • aspirin chewable tablet 81 mg  81 mg Oral QAM   • atorvastatin (LIPITOR) tablet 80 mg  80 mg Oral QAM   • azaTHIOprine (IMURAN) tablet 50 mg  50 mg Oral Daily   • carvedilol (COREG) tablet 6 25 mg  6 25 mg Oral BID With Meals   • dexamethasone (DECADRON) injection 6 mg  6 mg Intravenous Q24H   • heparin (porcine) 25,000 units in 0 45% NaCl 250 mL infusion (premix)  3-30 Units/kg/hr (Order-Specific) Intravenous Titrated   • heparin (porcine) injection 2,000 Units  2,000 Units Intravenous Q1H PRN   • heparin (porcine) injection 4,000 Units  4,000 Units Intravenous Q1H PRN   • influenza vaccine, recombinant, quadrivalent (FLUBLOK) IM injection 0 5 mL  0 5 mL Intramuscular Once   • insulin glargine (LANTUS) subcutaneous injection 10 Units 0 1 mL  10 Units Subcutaneous Q12H JAMARCUS   • insulin lispro (HumaLOG) 100 units/mL subcutaneous injection 1-5 Units  1-5 Units Subcutaneous TID AC   • isosorbide mononitrate (IMDUR) 24 hr tablet 30 mg  30 mg Oral Daily   • midodrine (PROAMATINE) tablet 5 mg  5 mg Oral TID PRN   • NIFEdipine (PROCARDIA XL) 24 hr tablet 30 mg  30 mg Oral Daily   • ondansetron (ZOFRAN) injection 4 mg  4 mg Intravenous Q6H PRN   • pantoprazole (PROTONIX) EC tablet 40 mg  40 mg Oral Daily   • [START ON 10/11/2022] remdesivir (Veklury) 100 mg in sodium chloride 0 9 % 270 mL IVPB  100 mg Intravenous Q24H   • tacrolimus (PROGRAF) capsule 1 mg  1 mg Oral HS   • tacrolimus (PROGRAF) capsule 2 mg  2 mg Oral Daily    and PTA meds:    Medications Prior to Admission   Medication   • aspirin 81 mg chewable tablet   • atorvastatin (LIPITOR) 80 mg tablet   • azaTHIOprine (IMURAN) 50 mg tablet   • carvedilol (COREG) 6 25 mg tablet   • cinacalcet (SENSIPAR) 30 mg tablet   • Glucagon, rDNA, (Glucagon Emergency) 1 MG KIT   • glucose blood test strip   • insulin aspart (NovoLOG) 100 units/mL injection   • insulin glargine (Semglee) 100 units/mL subcutaneous injection   • Insulin Syringe-Needle U-100 31G X 15/64" 0 5 ML MISC   • isosorbide mononitrate (IMDUR) 30 mg 24 hr tablet   • midodrine (PROAMATINE) 5 mg tablet   • NIFEdipine (PROCARDIA XL) 30 mg 24 hr tablet   • pantoprazole (PROTONIX) 40 mg tablet   • predniSONE 5 mg tablet   • sevelamer carbonate (RENVELA) 800 mg tablet   • tacrolimus (PROGRAF) 1 mg capsule         No Known Allergies    Objective   No intake or output data in the 24 hours ending 10/10/22 1243    Invasive Devices:        Physical Exam      No intake/output data recorded  Vitals:    10/10/22 1018   BP:    Pulse: 85   Resp: 18   Temp:    SpO2: 95%       General Appearance:    No acute distress  Cooperative  Appears stated age  Head:    Normocephalic  Atraumatic  Normal jaw occlusion  Eyes:    Lids, conjunctiva normal  No scleral icterus  Ears:    Normal external ears  Nose:   Nares normal  No drainage  Mouth:   Lips, tongue normal  Mucosa normal  Phonation normal    Neck:   Supple  Symmetrical    Back:     Symmetric  No CVA tenderness  Lungs:     Normal respiratory effort  Scattered exp cracklesto auscultation bilaterally  Chest wall:    No tenderness or deformity  Heart:    Regular rate and rhythm  Normal S1 and S2  No murmur  No JVD  No edema  Abdomen:     Soft  Non-tender  Bowel sounds active  Genitourinary:   No Chowdary catheter present  Extremities:   Extremities right BKA   RUE AVF with thrill and bruit   Skin:   Warm and dry  No pallor, jaundice, rash, ecchymoses  Neurologic:   Alert and oriented to person, place, time  No focal deficit  Current Weight: Weight - Scale:  (pt refused to stand to weigh dt not having prosthetic at bedside)  First Weight: Weight - Scale:  (pt refused to stand to weigh dt not having prosthetic at bedside)    Lab Results:  I have personally reviewed pertinent labs      CBC:   Lab Results   Component Value Date    WBC 5 30 10/10/2022    HGB 11 8 (L) 10/10/2022    HCT 33 8 (L) 10/10/2022     (H) 10/10/2022     10/10/2022    MCH 35 5 (H) 10/10/2022    MCHC 34 9 10/10/2022    RDW 13 7 10/10/2022    MPV 8 9 10/10/2022    NRBC 0 10/10/2022     CMP:   Lab Results   Component Value Date    K 3 7 10/10/2022    CL 90 (L) 10/10/2022    CO2 23 10/10/2022    BUN 40 (H) 10/10/2022    CREATININE 7 04 (H) 10/10/2022    CALCIUM 8 5 10/10/2022    AST 61 (H) 10/10/2022    ALT 79 (H) 10/10/2022    ALKPHOS 129 (H) 10/10/2022    EGFR 8 10/10/2022     Phosphorus: No results found for: PHOS  Magnesium: No results found for: MG  Urinalysis: No results found for: Edgar Ice, SPECGRAV, PHUR, LEUKOCYTESUR, NITRITE, PROTEINUA, GLUCOSEU, KETONESU, BILIRUBINUR, BLOODU  Ionized Calcium: No results found for: CAION  Coagulation:   Lab Results   Component Value Date    INR 0 96 10/10/2022     Troponin: No results found for: TROPONINI  ABG: No results found for: PHART, USW6DTQ, PO2ART, RVV3DJM, Y9UXYSMK, BEART, SOURCE    Results from last 7 days   Lab Units 10/10/22  0545   POTASSIUM mmol/L 3 7   CHLORIDE mmol/L 90*   CO2 mmol/L 23   BUN mg/dL 40*   CREATININE mg/dL 7 04*   CALCIUM mg/dL 8 5   ALK PHOS U/L 129*   ALT U/L 79*   AST U/L 61*       Radiology review:  Procedure: XR chest portable    Result Date: 10/10/2022  Narrative: CHEST INDICATION:   Hypoxia, COVID +  Patient has confirmed COVID-19  Positive on 10/10/2022  COMPARISON:  CXR and CT 10/1/2022  EXAM PERFORMED/VIEWS:  XR CHEST PORTABLE FINDINGS: Cardiomediastinal silhouette appears unremarkable  Improving bilateral lower lobe pneumonia, present on the chest CT from 10/1/2022  No effusion or pneumothorax  Osseous structures appear within normal limits for patient age  Impression: Improving bilateral lower lobe pneumonia, present on the chest CT from 10/1/2022   Workstation performed: [id number]         EKG, Pathology, and Other Studies: I have reviewed his notes and labs and prior notes  I personally reviewed his chest x-ray from this morning which demonstrates bilateral infiltrates        Counseling / Coordination of Care  Total ADDITIONAL floor / unit time spent today 45 minutes  Greater than 50% of total time was spent with the patient and / or family counseling and / or coordination of care  A description of the counseling / coordination of care: discussed with Teresa Suarez and the patient    Laverna Kehr      This consultation note was produced in part using a dictation device which may document imprecise wording from author's original intent

## 2022-10-10 NOTE — ED NOTES
Patient oxygen increased from 2 L to 4 L O2 NC  Provider aware       Michelle Magana RN  10/10/22 2784

## 2022-10-10 NOTE — ASSESSMENT & PLAN NOTE
· Patient recently tested positive for COVID-19 on a home COVID test   Reports increased shortness of breath, malaise over the past day  · Patient currently requiring 4 L nasal cannula oxygen and saturating at 93%  · Please see assessment and plan for pneumonia due to COVID-19 virus  · Continue nasal cannula oxygen as needed to maintain O2 sats above 92%  · Respiratory protocol

## 2022-10-10 NOTE — ASSESSMENT & PLAN NOTE
Lab Results   Component Value Date    HGBA1C 7 4 (H) 08/21/2022   · Blood glucose on admission 208  · Continue home glargine 10 units subQ q 12  · Sliding scale insulin  · A c  HS fingerstick glucose checks

## 2022-10-10 NOTE — CASE MANAGEMENT
Case Management Assessment & Discharge Planning Note    Patient name Yaya Estevez  Location [facility] [id number] MRN [id number]  : 1970 Date 10/10/2022       Current Admission Date: 10/10/2022  Current Admission Diagnosis:Acute on chronic respiratory failure with hypoxia [facility])   Patient Active Problem List    Diagnosis Date Noted   • Pneumonia due to COVID-19 virus 10/10/2022   • Chronic diastolic congestive heart failure ([facility] ) 10/10/2022   • Gastroenteritis 10/01/2022   • Vasovagal syncope 10/01/2022   • Elevated MCV 02/10/2021   • Hyperbilirubinemia 02/10/2021   • Acute on chronic diastolic CHF (congestive heart failure) ([facility] ) 02/10/2021   • Elevated procalcitonin 02/10/2021   • Mitral valve insufficiency 02/10/2021   • Abnormal EKG 02/10/2021   • HCAP (healthcare-associated pneumonia) 2020   • Acute on chronic respiratory failure with hypoxia ([facility] ) 2020   • Community acquired pneumonia of right lower lobe of lung 2020   • Respiratory distress 2020   • Lesion of pancreas 2020   • Abnormal CT scan, colon 2020   • Chronic kidney disease, unspecified 01/15/2020   • Coronary artery disease involving native coronary artery of native heart without angina pectoris 2019   • Pre-operative cardiovascular examination 2019   • ESRD (end stage renal disease) on dialysis ([facility] ) 2019   • Hypertensive urgency 2019   • Hx of right BKA ([facility] ) 10/21/2019   • Acute pulmonary edema ([facility] ) 10/20/2019   • Chronic anemia 10/14/2019   • Pulmonary hypertension ([facility] ) 10/13/2019   • Acute blood loss anemia 2019   • Acute respiratory failure with hypoxia ([facility] ) 2019   • S/P AKA (above knee amputation), right ([facility] ) 2019   • Depressed left ventricular ejection fraction 2019   • Hyperphosphatemia 2018   • Hyponatremia 2018   • Gastrointestinal hemorrhage 2018   • Severe sepsis ([facility] ) 2018   • Urinary retention 2017   • Chronic osteomyelitis of tibia ([facility] ) 09/23/2017   • DKA (diabetic ketoacidoses) 09/23/2017   • Elevated troponin 09/23/2017   • Diarrhea 09/23/2017   • Cellulitis of ankle 09/23/2017   • Non-ST elevation myocardial infarction (NSTEMI), type 2 09/23/2017   • Closed fracture of distal end of right tibia with routine healing 07/03/2017   • Osteomyelitis ([facility] ) 12/07/2016   • Poor circulation 12/07/2016   • Sepsis ([place] [address] ) 10/27/2016   • Type 1 diabetes mellitus ([place] [address] ) 10/26/2016   • Renal transplant, status post 10/26/2016   • Hyperkalemia 10/26/2016   • Immunosuppression ([place] [address] ) 11/04/2014   • Hypertension 08/04/2010      LOS (days): 0  Geometric Mean LOS (GMLOS) (days): 5 20  Days to GMLOS:4 7     OBJECTIVE:  PATIENT READMITTED TO HOSPITAL  Risk of Unplanned Readmission Score: 22 9         Current admission status: Inpatient       Preferred Pharmacy:   [address] [address]  Phone: [phone] Fax: [phone]    Primary Care Provider: Mariano Diallo DO    Primary Insurance: MEDICARE  Secondary Insurance: CAPITAL    ASSESSMENT:  [address] [facility], [address] [place] [address] Representative - Spouse   Primary Phone: [phone] (Mobile)  Home Phone: [phone]                         Readmission Root Cause  30 Day Readmission: Yes  Who directed you to return to the hospital?: Family  Did you understand whom to contact if you had questions or problems?: Yes  Did you get your prescriptions before you left the hospital?: Yes  Were you able to get your prescriptions filled when you left the hospital?: Yes  Did you take your medications as prescribed?: Yes  Were you able to get to your follow-up appointments?: Yes  During previous admission, was a post-acute recommendation made?: No  Patient was readmitted due to: Covid 19  Action Plan: Pt will return home with wife      Patient Information  Admitted from[de-identified] Home  Mental Status: Alert  During Assessment patient was accompanied by: Not accompanied during assessment  Assessment information provided by[de-identified] Patient  Primary Caregiver: Self  Support Systems: Spouse/significant other  [place] of Residence: [address] do you live in?: [address] [facility] entry access options  Select all that apply : No steps to enter home  Type of Current Residence: [place]  In the last 12 months, was there a time when you were not able to pay the mortgage or rent on time?: No  In the last 12 months, how many places have you lived?: 1  In the last 12 months, was there a time when you did not have a steady place to sleep or slept in a shelter (including now)?: No  Homeless/housing insecurity resource given?: N/A  Living Arrangements: Lives w/ Spouse/significant other  Is patient a ?: No    Activities of Daily Living Prior to Admission  Functional Status: Independent  Completes ADLs independently?: Yes  Ambulates independently?: Yes  Does patient use assisted devices?: Yes  Assisted Devices (DME) used:  Wheelchair, Darling Jeffrey  Does patient currently own DME?: Yes  What DME does the patient currently own?: Darling Murphy, Wheelchair  Does patient have a history of Outpatient Therapy (PT/OT)?: No  Does the patient have a history of Short-Term Rehab?: No  Does patient have a history of HHC?: Yes  Does patient currently have [address]?: No         Patient Information Continued  Income Source: Pension/alf  Does patient have prescription coverage?: Yes  Within the past 12 months, you worried that your food would run out before you got the money to buy more : Never true  Within the past 12 months, the food you bought just didn't last and you didn't have money to get more : Never true  Food insecurity resource given?: N/A  Does patient receive dialysis treatments?: Yes (Pt goes to the [facility] [address] in [place] on T, [place] and Sat )  Does patient have a history of substance abuse?: No  Does patient have a history of Mental Health Diagnosis?: No         Means of Transportation  Means of Transport to Appts[de-identified] Family transport (Pt's wife drives the patient to appts )  In the past 12 months, has lack of transportation kept you from medical appointments or from getting medications?: No  In the past 12 months, has lack of transportation kept you from meetings, work, or from getting things needed for daily living?: No  Was application for public transport provided?: N/A        DISCHARGE DETAILS:    Discharge planning discussed with[de-identified] Pt  Freedom of Choice: Yes     CM contacted family/caregiver?: No- see comments (Pt is alert and oriented x3)      Pt is a readmission   Pt was discharged from [address] [place] [address] on 10/3/22 after being treated for Sepsis, hypoxia  Pt   did not have any follow up appts since discharge  Pt is currently requiring 02 and does not have it at home  I will continue to follow for any Case Management needs

## 2022-10-10 NOTE — ASSESSMENT & PLAN NOTE
· Wt Readings from Last 3 Encounters:   10/03/22 52 7 kg (116 lb 2 9 oz)   08/24/22 52 2 kg (115 lb)   02/10/21 40 3 kg (88 lb 13 5 oz)     · Does not appear to be in acute exacerbation at this time  · ProBNP 8500 which is below patient's recent values above 15,000  · Chest x-ray does not appear to show any evidence of pulmonary volume overload  · Continue Coreg 6 25 mg oral b i d   · Continue to monitor volume status closely

## 2022-10-10 NOTE — ASSESSMENT & PLAN NOTE
· Current blood pressure is 124/60  · Continue Coreg 6 25 mg oral b i d   · Continue nifedipine 30 mg oral daily

## 2022-10-10 NOTE — ASSESSMENT & PLAN NOTE
· Patient has history of failed renal transplant now with ESRD    Currently on Tuesday Thursday Saturday dialysis  · Continue dialysis schedule  · Nephrology consult  · Check tacrolimus level  · Continue home dose of tacrolimus 2 capsules every morning 1 capsule at bedtime

## 2022-10-10 NOTE — ASSESSMENT & PLAN NOTE
Lab Results   Component Value Date    EGFR 7 10/03/2022    EGFR 11 10/02/2022    EGFR 15 10/01/2022    CREATININE 7 63 (H) 10/03/2022    CREATININE 5 14 (H) 10/02/2022    CREATININE 4 18 (H) 10/01/2022   · 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  · Undergoes dialysis Tuesday Thursday Saturday  · Nephrology consult  · Continue Tacrolimus 2 mg in morning 1 mg at night  · Check tacro level

## 2022-10-10 NOTE — ED PROVIDER NOTES
History  Chief Complaint   Patient presents with   • Cough     Pt stated he tested positive for covid this morning  He states he's coughing to the point of getting short of breath  Debbie Reed is a 45y o  year old male with PMH of ESRD on [facility], CAD, HTN, HLD, T1DM presenting to the [facility] ED for evaluation of a cough and shortness of breath  Patient started with generalized fatigue and weakness one day prior to arrival  Wife at home tested positive for COVID19 two days ago  Patient awoke this morning with nonproductive cough and shortness of breath  Patient also reporting mild bifrontal headache  Patient took a home COVID19 test this morning which resulted positive  Patient denies fevers, chest pain, N/V/D or abdominal pain  Of note, patient admitted to hospital and discharged one week ago for hypoxia thought to be related to aspiration event  Patient has not taken/received any medications at home for relief of symptoms  Patient has received COVID19 immunization x3  Compliant with Tu-Th-Sat HD as outpatient, received a full session two days ago  History provided by:  Patient and medical records   used: No        Prior to Admission Medications   Prescriptions Last Dose Informant Patient Reported? Taking?    Glucagon, rDNA, (Glucagon Emergency) 1 MG KIT   No No   Sig: INJECT 1MG SUBCUTANEOUSLY ONCE AS NEEDED FOR LOW BLOOD SUGAR FOR UP TO 1 DOSE   Insulin Syringe-Needle U-100 31G X 15/64" 0 5 ML MISC   No No   Sig: Use 3 (three) times a day   NIFEdipine (PROCARDIA XL) 30 mg 24 hr tablet   No No   Sig: Take 1 tablet (30 mg total) by mouth daily   aspirin 81 mg chewable tablet  Self Yes No   Sig: Chew 81 mg every morning    atorvastatin (LIPITOR) 80 mg tablet   No No   Sig: Take 1 tablet (80 mg total) by mouth every morning   azaTHIOprine (IMURAN) 50 mg tablet   No No   Sig: Take 1 tablet (50 mg total) by mouth daily   carvedilol (COREG) 6 25 mg tablet   No No   Sig: Take 1 tablet (6 25 mg total) by mouth 2 (two) times a day with meals   cinacalcet (SENSIPAR) 30 mg tablet   No No   Sig: Take 1 tablet (30 mg total) by mouth daily   Patient not taking: Reported on 9/30/2022   glucose blood test strip   No No   Sig: Use as instructed   Patient not taking: Reported on 9/30/2022   insulin aspart (NovoLOG) 100 units/mL injection   No No   Sig: Inject 10 Units under the skin 3 (three) times a day with meals   Patient taking differently: Inject 1-10 Units under the skin 3 (three) times a day with meals Pt uses Sliding scale at home   insulin glargine (Semglee) 100 units/mL subcutaneous injection   No No   Sig: Inject 10 Units under the skin every 12 (twelve) hours   isosorbide mononitrate (IMDUR) 30 mg 24 hr tablet   No No   Sig: Take 1 tablet (30 mg total) by mouth daily   midodrine (PROAMATINE) 5 mg tablet   Yes No   Sig: Take 5 mg by mouth 3 (three) times a day as needed   pantoprazole (PROTONIX) 40 mg tablet   Yes No   Sig: Take 40 mg by mouth daily   predniSONE 5 mg tablet   No No   Sig: Take 1 tablet (5 mg total) by mouth daily   sevelamer carbonate (RENVELA) 800 mg tablet   Yes No   Sig: Take 800 mg by mouth as needed   tacrolimus (PROGRAF) 1 mg capsule   No No   Sig: TAKE 2 CAPSULES BY MOUTH EVERY MORNING AND ONE CAPSULE EVERY DAY AT BEDTIME      Facility-Administered Medications: None       Past Medical History:   Diagnosis Date   • Bacteremia 12/21/2018   • CAD (coronary artery disease)     s/p MARC to LCx, pLAD 2/2018   • Cardiac arrest [facility])    • Chronic kidney disease    • Diabetes mellitus type 1 (HCC)    • Dialysis patient [facility])     Access in right chest   • GI bleed    • Hyperlipidemia    • Hypertension    • Infection at site of external fixator pin [facility])    • MI (myocardial infarction) ([facility] [address] )    • Pneumonia     Last Assessed 26Feb2013   • Renal failure    • Renal transplant, status post 07/21/2007       Past Surgical History:   Procedure Laterality Date   • AMPUTATION Right 02/2019    aboce knee   • ANKLE FRACTURE SURGERY     • ANKLE HARDWARE REMOVAL Right 7/31/2017    Procedure: REMOVAL HARDWARE ANKLE;  Surgeon: Esperanza Lu MD;  Location: [facility];  Service: Orthopedics   • ANKLE HARDWARE REMOVAL Right 8/17/2017    Procedure: TIBIA FAILED HARDWARE REMOVAL;  Surgeon: Esperanza Lu MD;  Location: [facility];  Service: Orthopedics   • CLOSED REDUCTION ANKLE Right 7/3/2017    Procedure: CLOSED REDUCTION DISTAL TIB-FIB AND CASTING VS;  Surgeon: Esperanza Lu MD;  Location: [facility];  Service: Orthopedics   • CORONARY ANGIOPLASTY WITH STENT PLACEMENT  02/2018    CAD s/p MARC to LCx, pLAD   • ESOPHAGOGASTRODUODENOSCOPY N/A 12/20/2018    Procedure: ESOPHAGOGASTRODUODENOSCOPY (EGD) in ICU; Surgeon: Gera Garcia MD;  Location: [facility]; Service: Gastroenterology   • EYE SURGERY     • FRACTURE SURGERY      ORIF Rt Ankle   • GLUTAMIC ACID DECARBOXYLASE (HISTORICAL)     • IR AV FISTULAGRAM/GRAFTOGRAM  4/16/2020   • IR PICC LINE  8/20/2018   • IR TUNNELED DIALYSIS CATHETER CHECK/CHANGE/REPOSITION/ANGIOPLASTY  1/8/2020   • IR TUNNELED DIALYSIS CATHETER PLACEMENT  10/21/2019   • IR TUNNELED DIALYSIS CATHETER REMOVAL  5/29/2020   • IR VENOUS LINE REMOVAL  10/5/2018   • LEG AMPUTATION Right 02/01/2019    Above the knee   • NEPHRECTOMY TRANSPLANTED ORGAN     • SC ANASTOMOSIS,AV,ANY SITE Right 2/11/2020    Procedure: CREATION FISTULA ARTERIOVENOUS (AV) right upper extremity;  Surgeon: Shawn Kessler MD;  [facility];  Service: Vascular   • SC OPEN TREATMENT FRACTURE DISTAL TIBIA FIBULA Right 7/3/2017    Procedure: OPEN REDUCTION W/ INTERNAL FIXATION (ORIF);   Surgeon: Esperanza Lu MD;  Location: [facility];  Service: Orthopedics   • TOE AMPUTATION Right 10/27/2016    Procedure: AMPUTATION TOE;  Surgeon: Kaylah Fleming DPM;  Location: [facility];  Service:        Family History   Problem Relation Age of Onset   • Diabetes Brother    • Coronary artery disease Mother    • No Known Problems Father      I have reviewed and agree with the history as documented  E-Cigarette/Vaping   • E-Cigarette Use Never User      E-Cigarette/Vaping Substances   • Nicotine No    • THC No    • CBD No    • Flavoring No    • Other No    • Unknown No      Social History     Tobacco Use   • Smoking status: Never Smoker   • Smokeless tobacco: Never Used   Vaping Use   • Vaping Use: Never used   Substance Use Topics   • Alcohol use: Not Currently     Alcohol/week: 0 0 standard drinks   • Drug use: Never       Review of Systems   Constitutional: Positive for fatigue  Negative for chills and fever  HENT: Negative for congestion, rhinorrhea and sore throat  Eyes: Negative for visual disturbance  Respiratory: Positive for cough and shortness of breath  Cardiovascular: Negative for chest pain and leg swelling  Gastrointestinal: Negative for abdominal pain, constipation, diarrhea, nausea and vomiting  Endocrine: Negative for polyuria  Genitourinary: Positive for decreased urine volume  Negative for flank pain  Musculoskeletal: Negative for myalgias, neck pain and neck stiffness  Skin: Negative for rash  Neurological: Positive for headaches  Negative for syncope, weakness and light-headedness  Hematological: Does not bruise/bleed easily  Psychiatric/Behavioral: Negative for confusion  All other systems reviewed and are negative  Physical Exam  Physical Exam  Vitals and nursing note reviewed  Constitutional:       General: He is not in acute distress  Appearance: Normal appearance  He is well-developed  He is not ill-appearing, toxic-appearing or diaphoretic  Interventions: Nasal cannula in place  Comments: Placed on 4L NC   HENT:      Head: Normocephalic and atraumatic  Nose: No congestion or rhinorrhea  Eyes:      General:         Right eye: No discharge  Left eye: No discharge  Cardiovascular:      Rate and Rhythm: Normal rate and regular rhythm  Pulmonary:      Effort: Pulmonary effort is normal  No respiratory distress  Breath sounds: Examination of the right-lower field reveals rales  Examination of the left-lower field reveals rales  Rales present  No wheezing  Abdominal:      General: There is no distension  Palpations: Abdomen is soft  Tenderness: There is no abdominal tenderness  There is no right CVA tenderness, left CVA tenderness, guarding or rebound  Musculoskeletal:      Cervical back: Normal range of motion  No rigidity  Left lower leg: No edema  Right Lower Extremity: Right leg is amputated above knee  Skin:     General: Skin is warm  Capillary Refill: Capillary refill takes less than 2 seconds  Findings: No rash  Neurological:      Mental Status: He is alert and oriented to person, place, and time     Psychiatric:         Mood and Affect: Mood normal          Behavior: Behavior normal          Vital Signs  ED Triage Vitals [10/10/22 0502]   Temperature Pulse Respirations Blood Pressure SpO2   97 7 °F (36 5 °C) 83 18 157/68 90 %      Temp Source Heart Rate Source Patient Position - Orthostatic VS BP Location FiO2 (%)   Tympanic Monitor Sitting Left arm --      Pain Score       No Pain           Vitals:    10/10/22 0502 10/10/22 0600   BP: 157/68 124/60   Pulse: 83 74   Patient Position - Orthostatic VS: Sitting Sitting         Visual Acuity      ED Medications  Medications   dexamethasone (DECADRON) injection 6 mg (6 mg Intravenous Given 10/10/22 0547)   remdesivir (Veklury) 200 mg in sodium chloride 0 9 % 290 mL IVPB (200 mg Intravenous Given 10/10/22 0615)     Followed by   remdesivir Angie Phil) 100 mg in sodium chloride 0 9 % 270 mL IVPB (has no administration in time range)   acetaminophen (TYLENOL) tablet 975 mg (975 mg Oral Given 10/10/22 0543)       Diagnostic Studies  Results Reviewed     Procedure Component Value Units Date/Time    Comprehensive metabolic panel [[id number]]  (Abnormal) Collected: 10/10/22 0545    Lab Status: Final result Specimen: Blood from Arm, Left Updated: 10/10/22 6994     Sodium 127 mmol/L      Potassium 3 7 mmol/L      Chloride 90 mmol/L      CO2 23 mmol/L      ANION GAP 14 mmol/L      BUN 40 mg/dL      Creatinine 7 04 mg/dL      Glucose 208 mg/dL      Calcium 8 5 mg/dL      Corrected Calcium 9 2 mg/dL      AST 61 U/L      ALT 79 U/L      Alkaline Phosphatase 129 U/L      Total Protein 7 1 g/dL      Albumin 3 1 g/dL      Total Bilirubin 0 62 mg/dL      eGFR 8 ml/min/1 73sq m     Narrative:      National Kidney Disease Foundation guidelines for Chronic Kidney Disease (CKD):   •  Stage 1 with normal or high GFR (GFR > 90 mL/min/1 73 square meters)  •  Stage 2 Mild CKD (GFR = 60-89 mL/min/1 73 square meters)  •  Stage 3A Moderate CKD (GFR = 45-59 mL/min/1 73 square meters)  •  Stage 3B Moderate CKD (GFR = 30-44 mL/min/1 73 square meters)  •  Stage 4 Severe CKD (GFR = 15-29 mL/min/1 73 square meters)  •  Stage 5 End Stage CKD (GFR <15 mL/min/1 73 square meters)  Note: GFR calculation is accurate only with a steady state creatinine    C-reactive protein [[id number]]  (Abnormal) Collected: 10/10/22 0545    Lab Status: Final result Specimen: Blood from Arm, Left Updated: 10/10/22 0625     CRP 11 9 mg/L     NT-BNP PRO [[id number]]  (Abnormal) Collected: 10/10/22 0545    Lab Status: Final result Specimen: Blood from Arm, Left Updated: 10/10/22 0625     NT-proBNP 8,513 pg/mL     Procalcitonin [[id number]]  (Abnormal) Collected: 10/10/22 0545    Lab Status: Final result Specimen: Blood from Arm, Left Updated: 10/10/22 0617     Procalcitonin 2 28 ng/ml     D-dimer, quantitative [[id number]]  (Abnormal) Collected: 10/10/22 0545    Lab Status: Final result Specimen: Blood from Arm, Left Updated: 10/10/22 0617     D-Dimer, Quant 2 00 ug/ml FEU     Narrative:       In the evaluation for possible pulmonary embolism, in the appropriate (Well's Score of 4 or less) patient, the age adjusted d-dimer cutoff for this patient can be calculated as:    Age x 0 01 (in ug/mL) for Age-adjusted D-dimer exclusion threshold for a patient over 50 years  CK (with reflex to MB) [[id number]]  (Normal) Collected: 10/10/22 0545    Lab Status: Final result Specimen: Blood from Arm, Left Updated: 10/10/22 0616     Total CK 82 U/L     Lactic acid, plasma [[id number]]  (Normal) Collected: 10/10/22 0545    Lab Status: Final result Specimen: Blood from Arm, Left Updated: 10/10/22 0609     LACTIC ACID 0 8 mmol/L     Narrative:      Result may be elevated if tourniquet was used during collection  CBC and differential [[id number]]  (Abnormal) Collected: 10/10/22 0545    Lab Status: Final result Specimen: Blood from Arm, Left Updated: 10/10/22 0553     WBC 5 08 Thousand/uL      RBC 3 37 Million/uL      Hemoglobin 11 7 g/dL      Hematocrit 34 2 %       fL      MCH 34 7 pg      MCHC 34 2 g/dL      RDW 13 7 %      MPV 9 5 fL      Platelets 997 Thousands/uL      nRBC 0 /100 WBCs      Neutrophils Relative 71 %      Immat GRANS % 1 %      Lymphocytes Relative 20 %      Monocytes Relative 7 %      Eosinophils Relative 1 %      Basophils Relative 0 %      Neutrophils Absolute 3 62 Thousands/µL      Immature Grans Absolute 0 06 Thousand/uL      Lymphocytes Absolute 1 00 Thousands/µL      Monocytes Absolute 0 34 Thousand/µL      Eosinophils Absolute 0 05 Thousand/µL      Basophils Absolute 0 01 Thousands/µL     Blood culture #1 [[id number]] Collected: 10/10/22 0545    Lab Status: In process Specimen: Blood from Arm, Left Updated: 10/10/22 0551    Blood culture #2 [[id number]] Collected: 10/10/22 0545    Lab Status:  In process Specimen: Blood from Arm, Left Updated: 10/10/22 0551                 XR chest portable   ED Interpretation by Josh Murcia DO (10/10 9924)   Diffuse patchy infiltrate, known COVID19                 Procedures  CriticalCare Time  Performed by: Josh Murcia DO  Authorized by: Josh Murcia DO     Critical care provider statement:     Critical care time (minutes):  36    Critical care start time:  10/10/2022 4:57 AM    Critical care end time:  10/10/2022 5:33 AM    Critical care time was exclusive of:  Separately billable procedures and treating other patients and teaching time    Critical care was necessary to treat or prevent imminent or life-threatening deterioration of the following conditions:  Respiratory failure    Critical care was time spent personally by me on the following activities:  Blood draw for specimens, obtaining history from patient or surrogate, development of treatment plan with patient or surrogate, evaluation of patient's response to treatment, examination of patient, review of old charts, re-evaluation of patient's condition, ordering and review of radiographic studies, ordering and review of laboratory studies and ordering and performing treatments and interventions    I assumed direction of critical care for this patient from another provider in my specialty: no               ED Course  ED Course as of 10/10/22 0626   [facility] Oct 10, 2022   0540 Procedure Note: EKG  Date/Time: 10/10/22 5:40 AM   Interpreted by: Leidy Iraheta  Indications / Diagnosis: Dyspnea  ECG reviewed by me, the ED Provider: yes   The EKG demonstrates:  Rhythm: normal sinus rhythm 73 BPM  Intervals: Normal IL intervals  QT interval 482ms  Axis: Normal axis  QRS/Blocks: Normal QRS  ST Changes: No acute ST/T waves changes  No DENNIS  No TWI  MDM  Number of Diagnoses or Management Options  Acute respiratory failure with hypoxia (HCC)  ESRD (end stage renal disease) on dialysis ([facility] [address] )  Pneumonia due to COVID-19 virus  Diagnosis management comments:   45 y o  male presenting for cough and shortness of breath  Reported to have positive home COVID19 test this morning  Patient hypoxic on room air, transitioned to 4L NC    Will order labs to screen for pneumonia, anemia, electrolyte abnormality, CHF/ACS  Will order CXR to evaluate for pneumonia  Impression: Acute hypoxic respiratory failure secondary to COVID19 pneumonia  Will admit for further evaluation and management  Patient care discussed with SLIM who will assume care and admit patient to the hospital  I have discussed with the patient our recommendation of inpatient admission for further medical care  I have answered all of the patient's questions and concerns  The patient is in agreement with the plan to proceed with admission to the hospital         Amount and/or Complexity of Data Reviewed  Clinical lab tests: ordered and reviewed  Tests in the radiology section of CPT®: ordered and reviewed  Review and summarize past medical records: yes  Discuss the patient with other providers: yes  Independent visualization of images, tracings, or specimens: yes    Patient Progress  Patient progress: improved      Disposition  Final diagnoses:   Pneumonia due to COVID-19 virus   Acute respiratory failure with [facility] ([facility] )   ESRD (end stage renal disease) on dialysis [facility])     Time reflects when diagnosis was documented in both MDM as applicable and the Disposition within this note     Time User Action Codes Description Comment    10/10/2022  5:16 AM Colleen Shania Add [U07 1,  J12 82] Pneumonia due to COVID-19 virus     10/10/2022  5:16 AM Colleen Shania Add [J96 01] Acute respiratory failure with hypoxia ([facility] [address] )     10/10/2022  5:16 AM Colleen [place] Add [N18 6,  Z99 2] ESRD (end stage renal disease) on dialysis [facility])       ED Disposition     ED Disposition   Admit    Condition   Stable    Date/Time   Mon Oct 10, 2022  5:16 AM    Comment   Case was discussed with MELISSA and the patient's admission status was agreed to be Admission Status: inpatient status to the service of Dr Amirah Sterling             Follow-up Information    None         Patient's Medications   Discharge Prescriptions    No medications on file       No discharge procedures on file     PDMP Review     None          ED Provider  Electronically Signed by           Edie Brittle, DO  10/10/22 0874

## 2022-10-11 ENCOUNTER — APPOINTMENT (INPATIENT)
Dept: DIALYSIS | Facility: HOSPITAL | Age: 52
DRG: 177 | End: 2022-10-11
Payer: MEDICARE

## 2022-10-11 ENCOUNTER — APPOINTMENT (INPATIENT)
Dept: CT IMAGING | Facility: HOSPITAL | Age: 52
DRG: 177 | End: 2022-10-11
Payer: MEDICARE

## 2022-10-11 LAB
ALBUMIN SERPL BCP-MCNC: 2.9 G/DL (ref 3.5–5)
ALP SERPL-CCNC: 105 U/L (ref 46–116)
ALT SERPL W P-5'-P-CCNC: 58 U/L (ref 12–78)
ANION GAP SERPL CALCULATED.3IONS-SCNC: 16 MMOL/L (ref 4–13)
APTT PPP: 40 SECONDS (ref 23–37)
APTT PPP: >210 SECONDS (ref 23–37)
AST SERPL W P-5'-P-CCNC: 54 U/L (ref 5–45)
BASOPHILS # BLD AUTO: 0.02 THOUSANDS/ΜL (ref 0–0.1)
BASOPHILS NFR BLD AUTO: 0 % (ref 0–1)
BILIRUB SERPL-MCNC: 0.64 MG/DL (ref 0.2–1)
BUN SERPL-MCNC: 58 MG/DL (ref 5–25)
CALCIUM ALBUM COR SERPL-MCNC: 9 MG/DL (ref 8.3–10.1)
CALCIUM SERPL-MCNC: 8.1 MG/DL (ref 8.3–10.1)
CHLORIDE SERPL-SCNC: 93 MMOL/L (ref 96–108)
CO2 SERPL-SCNC: 21 MMOL/L (ref 21–32)
CREAT SERPL-MCNC: 9.29 MG/DL (ref 0.6–1.3)
CRP SERPL QL: 8.4 MG/L
D DIMER PPP FEU-MCNC: 0.74 UG/ML FEU
EOSINOPHIL # BLD AUTO: 0.03 THOUSAND/ΜL (ref 0–0.61)
EOSINOPHIL NFR BLD AUTO: 0 % (ref 0–6)
ERYTHROCYTE [DISTWIDTH] IN BLOOD BY AUTOMATED COUNT: 13.9 % (ref 11.6–15.1)
GFR SERPL CREATININE-BSD FRML MDRD: 5 ML/MIN/1.73SQ M
GLUCOSE SERPL-MCNC: 144 MG/DL (ref 65–140)
GLUCOSE SERPL-MCNC: 227 MG/DL (ref 65–140)
GLUCOSE SERPL-MCNC: 233 MG/DL (ref 65–140)
GLUCOSE SERPL-MCNC: 238 MG/DL (ref 65–140)
GLUCOSE SERPL-MCNC: 250 MG/DL (ref 65–140)
GLUCOSE SERPL-MCNC: 258 MG/DL (ref 65–140)
HCT VFR BLD AUTO: 33 % (ref 36.5–49.3)
HGB BLD-MCNC: 11.3 G/DL (ref 12–17)
IMM GRANULOCYTES # BLD AUTO: 0.08 THOUSAND/UL (ref 0–0.2)
IMM GRANULOCYTES NFR BLD AUTO: 1 % (ref 0–2)
LYMPHOCYTES # BLD AUTO: 0.85 THOUSANDS/ΜL (ref 0.6–4.47)
LYMPHOCYTES NFR BLD AUTO: 12 % (ref 14–44)
MAGNESIUM SERPL-MCNC: 2.4 MG/DL (ref 1.6–2.6)
MCH RBC QN AUTO: 35 PG (ref 26.8–34.3)
MCHC RBC AUTO-ENTMCNC: 34.2 G/DL (ref 31.4–37.4)
MCV RBC AUTO: 102 FL (ref 82–98)
MONOCYTES # BLD AUTO: 0.1 THOUSAND/ΜL (ref 0.17–1.22)
MONOCYTES NFR BLD AUTO: 2 % (ref 4–12)
MRSA NOSE QL CULT: NORMAL
NEUTROPHILS # BLD AUTO: 5.75 THOUSANDS/ΜL (ref 1.85–7.62)
NEUTS SEG NFR BLD AUTO: 85 % (ref 43–75)
NRBC BLD AUTO-RTO: 0 /100 WBCS
PHOSPHATE SERPL-MCNC: 6.1 MG/DL (ref 2.7–4.5)
PLATELET # BLD AUTO: 203 THOUSANDS/UL (ref 149–390)
PMV BLD AUTO: 8.8 FL (ref 8.9–12.7)
POTASSIUM SERPL-SCNC: 4.5 MMOL/L (ref 3.5–5.3)
PROT SERPL-MCNC: 7 G/DL (ref 6.4–8.4)
RBC # BLD AUTO: 3.23 MILLION/UL (ref 3.88–5.62)
SODIUM SERPL-SCNC: 130 MMOL/L (ref 135–147)
WBC # BLD AUTO: 6.83 THOUSAND/UL (ref 4.31–10.16)

## 2022-10-11 PROCEDURE — 82948 REAGENT STRIP/BLOOD GLUCOSE: CPT

## 2022-10-11 PROCEDURE — 97163 PT EVAL HIGH COMPLEX 45 MIN: CPT

## 2022-10-11 PROCEDURE — 85730 THROMBOPLASTIN TIME PARTIAL: CPT | Performed by: FAMILY MEDICINE

## 2022-10-11 PROCEDURE — 99233 SBSQ HOSP IP/OBS HIGH 50: CPT | Performed by: NURSE PRACTITIONER

## 2022-10-11 PROCEDURE — G1004 CDSM NDSC: HCPCS

## 2022-10-11 PROCEDURE — 5A1D70Z PERFORMANCE OF URINARY FILTRATION, INTERMITTENT, LESS THAN 6 HOURS PER DAY: ICD-10-PCS | Performed by: FAMILY MEDICINE

## 2022-10-11 PROCEDURE — 85025 COMPLETE CBC W/AUTO DIFF WBC: CPT

## 2022-10-11 PROCEDURE — 84100 ASSAY OF PHOSPHORUS: CPT

## 2022-10-11 PROCEDURE — 83735 ASSAY OF MAGNESIUM: CPT

## 2022-10-11 PROCEDURE — 97167 OT EVAL HIGH COMPLEX 60 MIN: CPT

## 2022-10-11 PROCEDURE — 71275 CT ANGIOGRAPHY CHEST: CPT

## 2022-10-11 PROCEDURE — 80053 COMPREHEN METABOLIC PANEL: CPT

## 2022-10-11 PROCEDURE — 94760 N-INVAS EAR/PLS OXIMETRY 1: CPT

## 2022-10-11 PROCEDURE — 85379 FIBRIN DEGRADATION QUANT: CPT | Performed by: FAMILY MEDICINE

## 2022-10-11 PROCEDURE — 99232 SBSQ HOSP IP/OBS MODERATE 35: CPT | Performed by: FAMILY MEDICINE

## 2022-10-11 PROCEDURE — 86140 C-REACTIVE PROTEIN: CPT | Performed by: FAMILY MEDICINE

## 2022-10-11 RX ORDER — CALCIUM ACETATE 667 MG/1
667 CAPSULE ORAL
Status: DISCONTINUED | OUTPATIENT
Start: 2022-10-11 | End: 2022-10-12 | Stop reason: HOSPADM

## 2022-10-11 RX ORDER — INSULIN GLARGINE 100 [IU]/ML
6 INJECTION, SOLUTION SUBCUTANEOUS ONCE
Status: COMPLETED | OUTPATIENT
Start: 2022-10-11 | End: 2022-10-11

## 2022-10-11 RX ORDER — PREDNISONE 20 MG/1
40 TABLET ORAL DAILY
Status: DISCONTINUED | OUTPATIENT
Start: 2022-10-12 | End: 2022-10-12 | Stop reason: HOSPADM

## 2022-10-11 RX ORDER — INSULIN LISPRO 100 [IU]/ML
5 INJECTION, SOLUTION INTRAVENOUS; SUBCUTANEOUS
Status: DISCONTINUED | OUTPATIENT
Start: 2022-10-11 | End: 2022-10-12 | Stop reason: HOSPADM

## 2022-10-11 RX ORDER — CALCIUM CARBONATE 200(500)MG
750 TABLET,CHEWABLE ORAL
Status: DISCONTINUED | OUTPATIENT
Start: 2022-10-11 | End: 2022-10-12 | Stop reason: HOSPADM

## 2022-10-11 RX ORDER — CALCITRIOL 0.25 UG/1
0.25 CAPSULE, LIQUID FILLED ORAL 3 TIMES WEEKLY
Status: DISCONTINUED | OUTPATIENT
Start: 2022-10-11 | End: 2022-10-12 | Stop reason: HOSPADM

## 2022-10-11 RX ORDER — INSULIN GLARGINE 100 [IU]/ML
15 INJECTION, SOLUTION SUBCUTANEOUS EVERY 12 HOURS SCHEDULED
Status: DISCONTINUED | OUTPATIENT
Start: 2022-10-11 | End: 2022-10-12 | Stop reason: HOSPADM

## 2022-10-11 RX ORDER — CINACALCET 30 MG/1
30 TABLET, FILM COATED ORAL 3 TIMES WEEKLY
Status: DISCONTINUED | OUTPATIENT
Start: 2022-10-11 | End: 2022-10-12 | Stop reason: HOSPADM

## 2022-10-11 RX ADMIN — HEPARIN SODIUM 4000 UNITS: 1000 INJECTION INTRAVENOUS; SUBCUTANEOUS at 16:26

## 2022-10-11 RX ADMIN — CALCIUM ACETATE 667 MG: 667 CAPSULE ORAL at 16:25

## 2022-10-11 RX ADMIN — INSULIN GLARGINE 6 UNITS: 100 INJECTION, SOLUTION SUBCUTANEOUS at 22:38

## 2022-10-11 RX ADMIN — INSULIN GLARGINE 10 UNITS: 100 INJECTION, SOLUTION SUBCUTANEOUS at 08:50

## 2022-10-11 RX ADMIN — IOHEXOL 85 ML: 350 INJECTION, SOLUTION INTRAVENOUS at 10:10

## 2022-10-11 RX ADMIN — REMDESIVIR 100 MG: 100 INJECTION, POWDER, LYOPHILIZED, FOR SOLUTION INTRAVENOUS at 07:46

## 2022-10-11 RX ADMIN — PANTOPRAZOLE SODIUM 40 MG: 40 TABLET, DELAYED RELEASE ORAL at 08:50

## 2022-10-11 RX ADMIN — DEXAMETHASONE SODIUM PHOSPHATE 6 MG: 4 INJECTION, SOLUTION INTRAMUSCULAR; INTRAVENOUS at 05:41

## 2022-10-11 RX ADMIN — ISOSORBIDE MONONITRATE 30 MG: 30 TABLET, EXTENDED RELEASE ORAL at 08:50

## 2022-10-11 RX ADMIN — INSULIN LISPRO 2 UNITS: 100 INJECTION, SOLUTION INTRAVENOUS; SUBCUTANEOUS at 07:46

## 2022-10-11 RX ADMIN — NIFEDIPINE 30 MG: 30 TABLET, FILM COATED, EXTENDED RELEASE ORAL at 08:50

## 2022-10-11 RX ADMIN — CALCIUM ACETATE 667 MG: 667 CAPSULE ORAL at 11:28

## 2022-10-11 RX ADMIN — ASPIRIN 81 MG CHEWABLE TABLET 81 MG: 81 TABLET CHEWABLE at 08:50

## 2022-10-11 RX ADMIN — DESMOPRESSIN ACETATE 80 MG: 0.2 TABLET ORAL at 09:09

## 2022-10-11 RX ADMIN — INSULIN LISPRO 5 UNITS: 100 INJECTION, SOLUTION INTRAVENOUS; SUBCUTANEOUS at 11:24

## 2022-10-11 RX ADMIN — CALCITRIOL CAPSULES 0.25 MCG 0.25 MCG: 0.25 CAPSULE ORAL at 11:28

## 2022-10-11 RX ADMIN — INSULIN LISPRO 2 UNITS: 100 INJECTION, SOLUTION INTRAVENOUS; SUBCUTANEOUS at 11:22

## 2022-10-11 RX ADMIN — TACROLIMUS 2 MG: 1 CAPSULE ORAL at 08:49

## 2022-10-11 RX ADMIN — CINACALCET 30 MG: 30 TABLET, FILM COATED ORAL at 14:39

## 2022-10-11 RX ADMIN — CARVEDILOL 6.25 MG: 3.12 TABLET, FILM COATED ORAL at 07:45

## 2022-10-11 RX ADMIN — INSULIN LISPRO 5 UNITS: 100 INJECTION, SOLUTION INTRAVENOUS; SUBCUTANEOUS at 16:25

## 2022-10-11 RX ADMIN — AZATHIOPRINE 50 MG: 50 TABLET ORAL at 08:50

## 2022-10-11 RX ADMIN — MIDODRINE HYDROCHLORIDE 5 MG: 5 TABLET ORAL at 15:36

## 2022-10-11 RX ADMIN — TACROLIMUS 1 MG: 1 CAPSULE ORAL at 22:25

## 2022-10-11 NOTE — CASE MANAGEMENT
Case Management Discharge Planning Note    Patient name Rob Rodriguez  Location [facility] [id number] MRN [id number]  : 1970 Date 10/11/2022       Current Admission Date: 10/10/2022  Current Admission Diagnosis:Acute on chronic respiratory failure with hypoxia [facility])   Patient Active Problem List    Diagnosis Date Noted   • Pneumonia due to COVID-19 virus 10/10/2022   • Chronic diastolic congestive heart failure ([facility] [address] ) 10/10/2022   • Gastroenteritis 10/01/2022   • Vasovagal syncope 10/01/2022   • Elevated MCV 02/10/2021   • Hyperbilirubinemia 02/10/2021   • Acute on chronic diastolic CHF (congestive heart failure) ([facility] [address] ) 02/10/2021   • Elevated procalcitonin 02/10/2021   • Mitral valve insufficiency 02/10/2021   • Abnormal EKG 02/10/2021   • HCAP (healthcare-associated pneumonia) 2020   • Acute on chronic respiratory failure with hypoxia ([facility] ) 2020   • Community acquired pneumonia of right lower lobe of lung 2020   • Respiratory distress 2020   • Lesion of pancreas 2020   • Abnormal CT scan, colon 2020   • Chronic kidney disease, unspecified 01/15/2020   • Coronary artery disease involving native coronary artery of native heart without angina pectoris 2019   • Pre-operative cardiovascular examination 2019   • ESRD (end stage renal disease) on dialysis ([facility] ) 2019   • Hypertensive urgency 2019   • Hx of right BKA ([facility] ) 10/21/2019   • Acute pulmonary edema ([facility] ) 10/20/2019   • Chronic anemia 10/14/2019   • Pulmonary hypertension ([facility] ) 10/13/2019   • Acute blood loss anemia 2019   • Acute respiratory failure with hypoxia ([facility] ) 2019   • S/P AKA (above knee amputation), right ([facility] ) 2019   • Depressed left ventricular ejection fraction 2019   • Hyperphosphatemia 2018   • Hyponatremia 2018   • Gastrointestinal hemorrhage 2018   • Severe sepsis ([facility] ) 2018   • Urinary retention 2017   • Chronic osteomyel[facility] ([facility] ) 09/23/2017   • DKA (diabetic ketoacidoses) 09/23/2017   • Elevated troponin 09/23/2017   • Diarrhea 09/23/2017   • Cellulitis of ankle 09/23/2017   • Non-ST elevation myocardial infarction (NSTEMI), type 2 09/23/2017   • Closed fracture of distal end of right tibia with routine healing 07/03/2017   • Osteomyelitis ([facility] ) 12/07/2016   • Poor circulation 12/07/2016   • Sepsis ([facility] ) 10/27/2016   • Type 1 diabetes mellitus ([place] [address] ) 10/26/2016   • Renal transplant, status post 10/26/2016   • Hyperkalemia 10/26/2016   • Immunosuppression ([place] [address] ) 11/04/2014   • Hypertension 08/04/2010      LOS (days): 1  Geometric Mean LOS (GMLOS) (days): 5 20  Days to GMLOS:3 9     OBJECTIVE:  Risk of Unplanned Readmission Score: 29 95         Current admission status: Inpatient   Preferred Pharmacy:   [address] [address]  Phone: [phone] Fax: [phone]    Primary Care Provider: Tawana Tuttle DO    Primary Insurance: MEDICARE  Secondary Insurance: CAPITAL    DISCHARGE DETAILS:  As per the [address] Bailey patient will  go to the [facility] in [facility]   Pt will start Dialysis at the [facility] in [facility] on Friday  10/14/22 and Monday 10/17/22  And then can return to the [facility] in [place] on Thursday 10/20/22  Pt's Chair time will be 11:30 at [facility]  The address for [facility] is [address] [place] [address] [address]  Phone number is [phone] for [facility]  Physician and Patient's wife notified of same

## 2022-10-11 NOTE — PROGRESS NOTES
[address] [facility] [address]  Progress Note - Frandy Breaker 1970, 45 y o  male MRN: [id number]  Unit/Bed#: 407-01 Encounter: [id number]  Primary Care Provider: Monika Granados DO   Date and time admitted to hospital: 10/10/2022  4:58 AM    * Acute respiratory failure with hypoxia [facility])  Assessment & Plan  · Patient recently tested positive for COVID-19 on a home COVID test   Reports increased shortness of breath, malaise over the past day  · Patient currently requiring 4 L nasal cannula oxygen and saturating at 93% on admission, now weaned to room air  · Please see assessment and plan for pneumonia due to COVID-19 virus      Chronic diastolic congestive heart failure (HCC)  Assessment & Plan  Wt Readings from Last 3 Encounters:   10/11/22 50 4 kg (111 lb 1 8 oz)   10/03/22 52 7 kg (116 lb 2 9 oz)   08/24/22 52 2 kg (115 lb)     · Does not appear to be in acute exacerbation at this time  · ProBNP 8500 which is below patient's recent values above 15,000  · Chest x-ray does not appear to show any evidence of pulmonary volume overload  · Continue Coreg 6 25 mg oral b i d   · Continue to monitor volume status closely        Pneumonia due to COVID-19 virus  Assessment & Plan  · Patient presents with 1 day of increased shortness of breath, malaise  His wife tested positive on Saturday for [place]  He tested positive on home COVID test today, confirmed positive COVID-19 status on admission  · Procalcitonin elevated at 2 28  · CRP elevated at 11 9, trending down  · D-dimer elevated at 2 20->0 7  · Chest x-ray does appear to show patchy infiltrates  · Initiated on Decadron 6 mg IV Q 24 - with resolution of hypoxia and significant steroid induced hyperglycemia will change to prednisone  · Continue remdesivir  · Due to elevated D-dimer, initiated on VTE/PE heparin drip  CTA of the chest negative for PE    Venous duplex pending, if negative will discontinue heparin drip  · Blood cultures pending, no growth to date    Acute on chronic respiratory failure with hypoxia [facility])  Assessment & Plan        Pancreatic lesion  Assessment & Plan  Per CTA, "Multiple cystic pancreatic lesions are seen in the body of the pancreas with largest measuring about 1 6 cm in the head, Also visible on the previous study probably IPMN , can be assessed with the nonemergent MRI"    Coronary artery disease involving native coronary artery of native heart without angina pectoris  Assessment & Plan  · Continue Coreg 6 25 mg oral b i d  With meals  · Continue Imdur 30 mg oral daily  · Continue nifedipine 30 mg oral daily    ESRD (end stage renal disease) on dialysis [facility])  Assessment & Plan  Lab Results   Component Value Date    EGFR 5 10/11/2022    EGFR 8 10/10/2022    EGFR 7 10/03/2022    CREATININE 9 29 (H) 10/11/2022    CREATININE 7 04 (H) 10/10/2022    CREATININE 7 63 (H) 10/03/2022   · 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  · Undergoes dialysis Tuesday Thursday Saturday  · Nephrology consult  · Continue Tacrolimus 2 mg in morning 1 mg at night  · Due to COVID 19 positive status some challenges with scheduling outpatient hemodialysis, next available treatment on outpatient basis would be this Friday      Renal transplant, status post  Assessment & Plan  · Patient has history of failed renal transplant now with ESRD    Currently on Tuesday Thursday Saturday dialysis  · Continue dialysis schedule  · Nephrology consulted  · Continue home dose of tacrolimus 2 capsules every morning 1 capsule at bedtime    Type 1 diabetes mellitus ([facility] [address] )  Assessment & Plan    Lab Results   Component Value Date    HGBA1C 7 4 (H) 08/21/2022       Steroid induced hyperglycemia, adjust insulin  Patient weaned off supplemental oxygen, will discontinue Decadron and transitioned to oral prednisone tomorrow for a short tapering course          VTE Pharmacologic Prophylaxis: VTE Score: 6 High Risk (Score >/= 5) - Pharmacological DVT Prophylaxis Ordered: heparin drip  Sequential Compression Devices Ordered  Patient Centered Rounds: I performed bedside rounds with nursing staff today  Discussions with Specialists or Other Care Team Provider:     Education and Discussions with Family / Patient: Patient   Time Spent for Care: 30 minutes  More than 50% of total time spent on counseling and coordination of care as described above  Current Length of Stay: 1 day(s)  Current Patient Status: Inpatient   Certification Statement: The patient will continue to require additional inpatient hospital stay due to close monitroing, HD arrangements   Discharge Plan: Anticipate discharge in 24-48 hrs to home  Code Status: Level 1 - Full Code    Subjective:    Patient voices no complaints, no overnight events  Weaned to room air  Objective:     Vitals:   Temp (24hrs), Av 1 °F (36 7 °C), Min:97 6 °F (36 4 °C), Max:98 3 °F (36 8 °C)    Temp:  [97 6 °F (36 4 °C)-98 3 °F (36 8 °C)] 97 6 °F (36 4 °C)  HR:  [74-80] 74  Resp:  [15-16] 16  BP: (103-119)/(48-56) 119/56  SpO2:  [89 %-97 %] 92 %  Body mass index is 19 07 kg/m²  Input and Output Summary (last 24 hours): Intake/Output Summary (Last 24 hours) at 10/11/2022 1434  Last data filed at 10/11/2022 1210  Gross per 24 hour   Intake 627 3 ml   Output --   Net 627 3 ml       Physical Exam:   Physical Exam  Constitutional:       General: He is not in acute distress  HENT:      Head: Normocephalic and atraumatic  Nose: No congestion  Mouth/Throat:      Pharynx: Oropharynx is clear  Eyes:      Conjunctiva/sclera: Conjunctivae normal    Cardiovascular:      Rate and Rhythm: Normal rate and regular rhythm  Heart sounds: No murmur heard  Pulmonary:      Effort: No respiratory distress  Breath sounds: No wheezing or rales  Abdominal:      General: There is no distension  Tenderness: There is no abdominal tenderness  There is no guarding     Musculoskeletal:         General: Deformity (R BKA ) present  Right lower leg: No edema  Left lower leg: No edema  Skin:     General: Skin is warm and dry  Neurological:      Mental Status: He is oriented to person, place, and time  Psychiatric:         Mood and Affect: Mood normal           Additional Data:     Labs:  Results from last 7 days   Lab Units 10/11/22  0854   WBC Thousand/uL 6 83   HEMOGLOBIN g/dL 11 3*   HEMATOCRIT % 33 0*   PLATELETS Thousands/uL 203   NEUTROS PCT % 85*   LYMPHS PCT % 12*   MONOS PCT % 2*   EOS PCT % 0     Results from last 7 days   Lab Units 10/11/22  0854   SODIUM mmol/L 130*   POTASSIUM mmol/L 4 5   CHLORIDE mmol/L 93*   CO2 mmol/L 21   BUN mg/dL 58*   CREATININE mg/dL 9 29*   ANION GAP mmol/L 16*   CALCIUM mg/dL 8 1*   ALBUMIN g/dL 2 9*   TOTAL BILIRUBIN mg/dL 0 64   ALK PHOS U/L 105   ALT U/L 58   AST U/L 54*   GLUCOSE RANDOM mg/dL 238*     Results from last 7 days   Lab Units 10/10/22  0920   INR  0 96     Results from last 7 days   Lab Units 10/11/22  1109 10/11/22  0953 10/11/22  0742 10/10/22  2133 10/10/22  1554 10/10/22  1432 10/10/22  1105 10/10/22  0927   POC GLUCOSE mg/dl 250* 258* 233* >500* 452* 421* 306* 229*         Results from last 7 days   Lab Units 10/10/22  0545   LACTIC ACID mmol/L 0 8   PROCALCITONIN ng/ml 2 28*       Lines/Drains:  Invasive Devices  Report    Peripheral Intravenous Line  Duration           Peripheral IV 10/10/22 Left Antecubital 1 day    Peripheral IV 10/10/22 Left Forearm 1 day          Line  Duration           Hemodialysis AV Fistula Right Upper arm -- days                      Imaging: Reviewed radiology reports from this admission including: chest CT scan and Personally reviewed the following imaging: chest CT scan    Recent Cultures (last 7 days):   Results from last 7 days   Lab Units 10/10/22  0545   BLOOD CULTURE  No Growth at 24 hrs  No Growth at 24 hrs         Last 24 Hours Medication List:   Current Facility-Administered Medications   Medication Dose Route Frequency Provider Last Rate   • acetaminophen  650 mg Oral Q6H PRN Yelena Cox MD     • albuterol  2 5 mg Nebulization Q6H PRN Yelena Babcock MD     • aspirin  81 mg Oral QAM Katerina Pinto PA-C     • atorvastatin  80 mg Oral QAM Katerina Pinto PA-C     • azaTHIOprine  50 mg Oral Daily Katerina Pinto PA-C     • calcitriol  0 25 mcg Oral Once per day on Tue Thu Sat GAVINO Brian     • calcium acetate  667 mg Oral TID With Meals GAVINO Brian     • calcium carbonate  750 mg Oral After Dialysis GAVINO Brian     • carvedilol  6 25 mg Oral BID With Meals Katerina Pinto PA-C     • cinacalcet  30 mg Oral Once per day on Tue Thu Sat GAVINO Brian     • heparin (porcine)  3-30 Units/kg/hr (Order-Specific) Intravenous Titrated Ernestine Garcia MD 9 Units/kg/hr (10/11/22 1100)   • heparin (porcine)  2,000 Units Intravenous Q1H PRN Ernestine Garcia MD     • heparin (porcine)  4,000 Units Intravenous Q1H PRN Ernestine Garcia MD     • insulin glargine  15 Units Subcutaneous Q12H [address] Yelena Cox MD     • insulin lispro  1-5 Units Subcutaneous TID AC Abdulkadir Robles PA-C     • insulin lispro  5 Units Subcutaneous TID With Meals Yelena Babcock MD     • isosorbide mononitrate  30 mg Oral Daily Katerina Pinto PA-C     • midodrine  5 mg Oral TID PRN Katerina Pinto PA-C     • NIFEdipine  30 mg Oral Daily Katerina Pinto PA-C     • ondansetron  4 mg Intravenous Q6H PRN Katerina Pinto PA-C     • pantoprazole  40 mg Oral Daily Katerina Pinto PA-C     • [START ON 10/12/2022] predniSONE  40 mg Oral Daily Yelena Cox MD     • remdesivir  100 mg Intravenous Q24H Jorge L Quevedo DO     • tacrolimus  1 mg Oral HS Abdulkadir Robles PA-C     • tacrolimus  2 mg Oral Daily Katerina Pinto PA-C          Today, Patient Was Seen By: Hosea Tamayo    **Please Note: This note may have been constructed using a voice recognition system  **

## 2022-10-11 NOTE — PROGRESS NOTES
NEPHROLOGY PROGRESS NOTE   Marcus Villalobos 45 y o  male MRN: [id number]  Unit/Bed#: 407-01 Encounter: [id number]    Assessment/Plan:    48-year-old man with ESRD on hemodialysis, history of L DDRT 2001, chronic immunosuppression, type 1 diabetes mellitus, BKA, recent hospitalization for gastroenteritis an aspiration pneumonia presented 10/10 chief complaint shortness of breath being treated for acute hypoxic respiratory failure in the setting of COVID-19 pneumonia  1  ESRD on maintenance hemodialysis Tuesday, Thursday, Saturday at [facility]  · Will have CTA done and then will undergo scheduled will undergo 3 hours treatment today given COVID-19 positive and then continue on a Tuesday, Thursday, Saturday schedule  Target weight around 47 5 kg   · Access:  Right AV fistula  · Add phosphorus and fluid restriction  2  History of LDRT 2001  · Continue Prograf and Imuran has ordered  3  Mineral bone disease  · Continue Sensipar 30 mg with dialysis, calcitriol 0 25 mcg with dialysis, Tums 750 mg with dialysis, continue PhosLo with meals  4  Anemia of ESRD  · Hemoglobin above goal for Epogen  Additionally COVID positive and tentative for CTA  5  Type 1 diabetes mellitus  · Blood sugar control per primary team  6  Acute hypoxic respiratory failure secondary to COVID-19 pneumonia  · Receiving heparin infusion per primary team and plan to obtain CTA prior to hemodialysis  ROS  Seen and examined lying in bed  States shortness of breath is improved  No other physical complaints  A complete 10 point review of systems have been performed and are otherwise negative         Historical Information   Past Medical History:   Diagnosis Date   • Bacteremia 12/21/2018   • CAD (coronary artery disease)     s/p MARC to LCx, pLAD 2/2018   • Cardiac arrest [facility])    • Chronic kidney disease    • Diabetes mellitus type 1 ([facility] [address] )    • Dialysis patient [facility])     Access in right chest   • GI bleed    • Hyperlipidemia    • Hypertension • Infection at site of external fixator pin [facility])    • MI (myocardial infarction) ([facility] [address] )    • Pneumonia     Last Assessed [id number]   • Renal failure    • Renal transplant, status post 07/21/2007     Past Surgical History:   Procedure Laterality Date   • AMPUTATION Right 02/2019    aboce knee   • ANKLE FRACTURE SURGERY     • ANKLE HARDWARE REMOVAL Right 7/31/2017    Procedure: REMOVAL HARDWARE ANKLE;  Surgeon: La Ramirez MD;  Location: [facility] MA[facility] OR;  Service: Orthopedics   • ANKLE HARDWARE REMOVAL Right 8/17/2017    Procedure: TIBIA FAILED HARDWARE REMOVAL;  Surgeon: La Ramirez MD;  Location: [facility] MA[facility];  Service: Orthopedics   • CLOSED REDUCTION ANKLE Right 7/3/2017    Procedure: CLOSED REDUCTION DISTAL TIB-FIB AND CASTING VS;  Surgeon: La Ramirez MD;  Location: [facility] MA[facility] OR;  Service: Orthopedics   • CORONARY ANGIOPLASTY WITH STENT PLACEMENT  02/2018    CAD s/p MARC to LCx, pLAD   • ESOPHAGOGASTRODUODENOSCOPY N/A 12/20/2018    Procedure: ESOPHAGOGASTRODUODENOSCOPY (EGD) in ICU; Surgeon: Carlos Moctezuma MD;  Location: [facility] GI LAB; Service: Gastroenterology   • EYE SURGERY     • FRACTURE SURGERY      ORIF Rt Ankle   • GLUTAMIC ACID DECARBOXYLASE (HISTORICAL)     • IR AV FISTULAGRAM/GRAFTOGRAM  4/16/2020   • IR PICC LINE  8/20/2018   • IR TUNNELED DIALYSIS CATHETER CHECK/CHANGE/REPOSITION/ANGIOPLASTY  1/8/2020   • IR TUNNELED DIALYSIS CATHETER PLACEMENT  10/21/2019   • IR TUNNELED DIALYSIS CATHETER REMOVAL  5/29/2020   • IR VENOUS LINE REMOVAL  10/5/2018   • LEG AMPUTATION Right 02/01/2019    Above the knee   • NEPHRECTOMY TRANSPLANTED ORGAN     • KY ANASTOMOSIS,AV,ANY SITE Right 2/11/2020    Procedure: CREATION FISTULA ARTERIOVENOUS (AV) right upper extremity;  Surgeon: Mike Lemon MD;  Location: [facility] MAIN OR;  Service: Vascular   • KY OPEN TREATMENT FRACTURE DISTAL TIBIA FIBULA Right 7/3/2017    Procedure: OPEN REDUCTION W/ INTERNAL FIXATION (ORIF);   Surgeon: La Ramirez MD;  Location: [facility]; Service: Orthopedics   • TOE AMPUTATION Right 10/27/2016    Procedure: AMPUTATION TOE;  Surgeon: Rober Ascencio DPM;  Location: [facility];  Service:      Social History   Social History     Substance and Sexual Activity   Alcohol Use Not Currently   • Alcohol/week: 0 0 standard drinks     Social History     Substance and Sexual Activity   Drug Use Never     Social History     Tobacco Use   Smoking Status Never Smoker   Smokeless Tobacco Never Used       Family History:   Family History   Problem Relation Age of Onset   • Diabetes Brother    • Coronary artery disease Mother    • No Known Problems Father        Medications:  Pertinent medications were reviewed  Current Facility-Administered Medications   Medication Dose Route Frequency Provider Last Rate   • acetaminophen  650 mg Oral Q6H PRN Yelena Cox MD     • albuterol  2 5 mg Nebulization Q6H PRN Yelena Lake MD     • aspirin  81 mg Oral QAM Tate Morgan PA-C     • atorvastatin  80 mg Oral QAM Tate Morgan PA-C     • azaTHIOprine  50 mg Oral Daily KEVAN WilloughbyC     • carvedilol  6 25 mg Oral BID With Meals Tate Morgan PA-C     • dexamethasone  6 mg Intravenous Q24H Tate Morgan PA-C     • heparin (porcine)  3-30 Units/kg/hr (Order-Specific) Intravenous Titrated Renita Carrera MD Stopped (10/11/22 0690)   • heparin (porcine)  2,000 Units Intravenous Q1H PRN Ernestine Gamble MD     • heparin (porcine)  4,000 Units Intravenous Q1H PRN Ernestine Gamble MD     • insulin glargine  10 Units Subcutaneous Q12H [facility] Abdulkadir Robles PA-C     • insulin lispro  1-5 Units Subcutaneous TID  Abdulkadir Robles PA-C     • isosorbide mononitrate  30 mg Oral Daily Tate Morgan PA-C     • midodrine  5 mg Oral TID PRN Tate Morgan PA-C     • NIFEdipine  30 mg Oral Daily Tate Morgan PA-C     • ondansetron  4 mg Intravenous Q6H PRN Tate Morgan PA-C     • pantoprazole  40 mg Oral Daily Darlyn Langford PA-C     • remdesivir  100 mg Intravenous Q24H Roz Jorgensen,      • tacrolimus  1 mg Oral HS Darlyn Langford PA-C     • tacrolimus  2 mg Oral Daily Abdulkadir Robles PA-C           No Known Allergies      Vitals:   /56 (BP Location: Left arm)   Pulse 74   Temp 97 6 °F (36 4 °C) (Oral)   Resp 16   Ht 5' 4" (1 626 m)   Wt 50 4 kg (111 lb 1 8 oz) Comment: refused to stand for weight, taken in bed  SpO2 92%   BMI 19 07 kg/m²   Body mass index is 19 07 kg/m²  SpO2: 92 %,   SpO2 Activity: During Exercise,   O2 Device: None (Room air)      Intake/Output Summary (Last 24 hours) at 10/11/2022 0948  Last data filed at 10/11/2022 7579  Gross per 24 hour   Intake 747 3 ml   Output --   Net 747 3 ml     Invasive Devices  Report    Peripheral Intravenous Line  Duration           Peripheral IV 10/10/22 Left Antecubital 1 day    Peripheral IV 10/10/22 Left Forearm 1 day          Line  Duration           Hemodialysis AV Fistula Right Upper arm -- days                Physical Exam  General: conscious, cooperative, in no acute distress  Eyes: conjunctivae pink, anicteric sclerae  ENT: lips and mucous membranes moist  Neck: supple, no JVD, no masses  Chest:  Diminished breath sounds bilaterally, no crackles, ronchus or wheezings on nasal cannula  CVS: S1 & S2, normal rate, regular rhythm  Abdomen: soft, non-tender, non-distended, normoactive bowel sounds  Extremities: no edema of both legs BKA  Skin: no rash  Neuro: awake, alert, oriented      Diagnostic Data:  Lab: I have personally reviewed pertinent lab results  ,   CBC:  Results from last 7 days   Lab Units 10/11/22  0854   WBC Thousand/uL 6 83   HEMOGLOBIN g/dL 11 3*   HEMATOCRIT % 33 0*   PLATELETS Thousands/uL 203      CMP:   Lab Results   Component Value Date    SODIUM 130 (L) 10/11/2022    K 4 5 10/11/2022    CL 93 (L) 10/11/2022    CO2 21 10/11/2022    BUN 58 (H) 10/11/2022    CREATININE 9 29 (H) 10/11/2022 CALCIUM 8 1 (L) 10/11/2022    AST 54 (H) 10/11/2022    ALT 58 10/11/2022    ALKPHOS 105 10/11/2022    EGFR 5 10/11/2022   ,   PT/INR: No results found for: PT, INR,   Magnesium: No components found for: MAG,  Phosphorous:   Lab Results   Component Value Date    PHOS 6 1 (H) 10/11/2022       Microbiology:  @LABRCNTIP,(urinecx:7)@        Dina Villaseñor    Portions of the record may have been created with voice recognition software  Occasional wrong word or "sound a like" substitutions may have occurred due to the inherent limitations of voice recognition software  Read the chart carefully and recognize, using context, where substitutions have occurred

## 2022-10-11 NOTE — PLAN OF CARE
Pre-tx:  UF 2 L as tolerated per order  Pt scheduled for HD tomorrow as well as he will be switching to MWF dialysis days d/t covid+ status upon d/c to [facility]  BP stable at this time  Will cont to monitor  Post-Dialysis RN Treatment Note    Blood Pressure:  Pre 135/62 mm/Hg  Post 104/49 mmHg   EDW  47 5 kg    Weight:  Pre 50 4 kg   Post 49 1  kg   Mode of weight measurement: Bed Scale   Volume Removed  1300 ml    Treatment duration  3 hrs   NS given   Yes, 200 mL NSS given per hypotension and light-headedness, interventions successful no further issues for the duration of tx   Treatment shortened?  No   Medications given during Rx Midodrine for symptomatic intradialytic hypotension    Estimated Kt/V  Not Applicable   Access type: AV fistula   Access Issues:  High venous pressure, BFR max 350 during HD today   Report called to primary nurse   Yes           Problem: METABOLIC, FLUID AND ELECTROLYTES - ADULT  Goal: Electrolytes maintained within normal limits  Description: INTERVENTIONS:  - Monitor labs and assess patient for signs and symptoms of electrolyte imbalances  - Administer electrolyte replacement as ordered  - Monitor response to electrolyte replacements, including repeat lab results as appropriate  - Instruct patient on fluid and nutrition as appropriate  Outcome: Progressing  Goal: Fluid balance maintained  Description: INTERVENTIONS:  - Monitor labs   - Monitor I/O and WT  - Instruct patient on fluid and nutrition as appropriate  - Assess for signs & symptoms of volume excess or deficit  Outcome: Progressing

## 2022-10-11 NOTE — ASSESSMENT & PLAN NOTE
· Patient has history of failed renal transplant now with ESRD    Currently on Tuesday Thursday Saturday dialysis  · Continue dialysis schedule  · Nephrology consulted  · Continue home dose of tacrolimus 2 capsules every morning 1 capsule at bedtime

## 2022-10-11 NOTE — CASE MANAGEMENT
Case Management Discharge Planning Note    Patient name Miller Number  Location [facility] [id number] MRN [id number]  : 1970 Date 10/11/2022       Current Admission Date: 10/10/2022  Current Admission Diagnosis:Acute on chronic respiratory failure with hypoxia [facility])   Patient Active Problem List    Diagnosis Date Noted   • Pneumonia due to COVID-19 virus 10/10/2022   • Chronic diastolic congestive heart failure ([facility] [address] ) 10/10/2022   • Gastroenteritis 10/01/2022   • Vasovagal syncope 10/01/2022   • Elevated MCV 02/10/2021   • Hyperbilirubinemia 02/10/2021   • Acute on chronic diastolic CHF (congestive heart failure) ([facility] ) 02/10/2021   • Elevated procalcitonin 02/10/2021   • Mitral valve insufficiency 02/10/2021   • Abnormal EKG 02/10/2021   • HCAP (healthcare-associated pneumonia) 2020   • Acute on chronic respiratory failure with hypoxia ([facility] ) 2020   • Community acquired pneumonia of right lower lobe of lung 2020   • Respiratory distress 2020   • Lesion of pancreas 2020   • Abnormal CT scan, colon 2020   • Chronic kidney disease, unspecified 01/15/2020   • Coronary artery disease involving native coronary artery of native heart without angina pectoris 2019   • Pre-operative cardiovascular examination 2019   • ESRD (end stage renal disease) on dialysis ([facility] ) 2019   • Hypertensive urgency 2019   • Hx of right BKA ([facility] ) 10/21/2019   • Acute pulmonary edema ([facility] ) 10/20/2019   • Chronic anemia 10/14/2019   • Pulmonary hypertension ([facility] ) 10/13/2019   • Acute blood loss anemia 2019   • Acute respiratory failure with hypoxia ([facility] ) 2019   • S/P AKA (above knee amputation), right ([facility] ) 2019   • Depressed left ventricular ejection fraction 2019   • Hyperphosphatemia 2018   • Hyponatremia 2018   • Gastrointestinal hemorrhage 2018   • Severe sepsis ([facility] ) 2018   • Urinary retention 2017   • Chronic osteomyelitis of tibia ([facility] ) 09/23/2017   • DKA (diabetic ketoacidoses) 09/23/2017   • Elevated troponin 09/23/2017   • Diarrhea 09/23/2017   • Cellulitis of ankle 09/23/2017   • Non-ST elevation myocardial infarction (NSTEMI), type 2 09/23/2017   • Closed fracture of distal end of right tibia with routine healing 07/03/2017   • Osteomyelitis ([facility] ) 12/07/2016   • Poor circulation 12/07/2016   • Sepsis ([place] [address] ) 10/27/2016   • Type 1 diabetes mellitus ([place] [address] ) 10/26/2016   • Renal transplant, status post 10/26/2016   • Hyperkalemia 10/26/2016   • Immunosuppression ([place] [address] ) 11/04/2014   • Hypertension 08/04/2010      LOS (days): 1  Geometric Mean LOS (GMLOS) (days): 5 20  Days to GMLOS:3 9     OBJECTIVE:  Risk of Unplanned Readmission Score: 29 95         Current admission status: Inpatient   Preferred Pharmacy:   [address] [address]  Phone: [phone] Fax: [phone]    Primary Care Provider: Renny Kussmaul, DO    Primary Insurance: MEDICARE  Secondary Insurance: CAPITAL    DISCHARGE DETAILS:  During inner disciplinary discharge planning meeting physician said patient is medically ready for discharge in the am  I called Venessa Simmons and the patient can not come back to the [facility] facility until 10/20/22  Pt will have to go to Dialysis in the [facility] or [place]   They will work on getting him set up at one of those facilities  I called Deandra and notified her of same

## 2022-10-11 NOTE — ASSESSMENT & PLAN NOTE
· Patient presents with 1 day of increased shortness of breath, malaise  His wife tested positive on Saturday for Gianni  He tested positive on home COVID test today, confirmed positive COVID-19 status on admission  · Procalcitonin elevated at 2 28  · CRP elevated at 11 9, trending down  · D-dimer elevated at 2 20->0 7  · Chest x-ray does appear to show patchy infiltrates  · Initiated on Decadron 6 mg IV Q 24 - with resolution of hypoxia and significant steroid induced hyperglycemia will change to prednisone  · Continue remdesivir  · Due to elevated D-dimer, initiated on VTE/PE heparin drip  CTA of the chest negative for PE    Venous duplex pending, if negative will discontinue heparin drip  · Blood cultures pending, no growth to date

## 2022-10-11 NOTE — ASSESSMENT & PLAN NOTE
Per CTA, "Multiple cystic pancreatic lesions are seen in the body of the pancreas with largest measuring about 1 6 cm in the head, Also visible on the previous study probably IPMN , can be assessed with the nonemergent MRI"

## 2022-10-11 NOTE — PHYSICAL THERAPY NOTE
Physical Therapy Evaluation    Patient Name: Debbie Reed    IZGKS'H Date: 10/11/2022     Problem List  Principal Problem:    Acute on chronic respiratory failure with hypoxia ([facility] )  Active Problems:    Type 1 diabetes mellitus ([facility] )    Renal transplant, status post    Hypertension    ESRD (end stage renal disease) on dialysis [facility])    Coronary artery disease involving native coronary artery of native heart without angina pectoris    Pneumonia due to COVID-19 virus    Chronic diastolic congestive heart failure [facility])       Past Medical History  Past Medical History:   Diagnosis Date    Bacteremia 12/21/2018    CAD (coronary artery disease)     s/p MARC to LCx, pLAD 2/2018    Cardiac arrest ([facility] )     Chronic kidney disease     Diabetes mellitus type 1 ([facility] )     Dialysis patient ([facility] )     Access in right chest    GI bleed     Hyperlipidemia     Hypertension     Infection at site of external fixator pin ([facility] )     MI (myocardial infarction) ([facility] )     Pneumonia     Last Assessed [id number]    Renal failure     Renal transplant, status post 07/21/2007        Past Surgical History  Past Surgical History:   Procedure Laterality Date    AMPUTATION Right 02/2019    aboce knee    ANKLE FRACTURE SURGERY      ANKLE HARDWARE REMOVAL Right 7/31/2017    Procedure: REMOVAL HARDWARE ANKLE;  Surgeon: Delgado Pool MD;  Location: [facility];  Service: Orthopedics    ANKLE HARDWARE REMOVAL Right 8/17/2017    Procedure: TIBIA FAILED HARDWARE REMOVAL;  Surgeon: Delgado Pool MD;  [facility]: [facility];  Service: Orthopedics    CLOSED REDUCTION ANKLE Right 7/3/2017    Procedure: CLOSED REDUCTION DISTAL TIB-FIB AND CASTING VS;  Surgeon: Delgado Pool MD;  [facility]: [facility];  Service: Orthopedics    CORONARY ANGIOPLASTY WITH STENT PLACEMENT  02/2018    CAD s/p MARC to LCx, pLAD    ESOPHAGOGASTRODUODENOSCOPY N/A 12/20/2018    Procedure: ESOPHAGOGASTRODUODENOSCOPY (EGD) in ICU; Surgeon: David Strange MD;  [facility]: [facility]; Service: Gastroenterology    EYE SURGERY      FRACTURE SURGERY      ORIF Rt Ankle    GLUTAMIC ACID DECARBOXYLASE (HISTORICAL)      IR AV FISTULAGRAM/GRAFTOGRAM  4/16/2020    IR PICC LINE  8/20/2018    IR TUNNELED DIALYSIS CATHETER CHECK/CHANGE/REPOSITION/ANGIOPLASTY  1/8/2020    IR TUNNELED DIALYSIS CATHETER PLACEMENT  10/21/2019    IR TUNNELED DIALYSIS CATHETER REMOVAL  5/29/2020    IR VENOUS LINE REMOVAL  10/5/2018    LEG AMPUTATION Right 02/01/2019    Above the knee    NEPHRECTOMY TRANSPLANTED ORGAN      NC ANASTOMOSIS,AV,ANY SITE Right 2/11/2020    Procedure: CREATION FISTULA ARTERIOVENOUS (AV) right upper extremity;  Surgeon: Krystal Siu MD;  Location: [facility] MAIN OR;  Service: Vascular    NC OPEN TREATMENT FRACTURE DISTAL TIBIA FIBULA Right 7/3/2017    Procedure: OPEN REDUCTION W/ INTERNAL FIXATION (ORIF); Surgeon: Tyrone Roger MD;  Location: [facility];  Service: Orthopedics    TOE AMPUTATION Right 10/27/2016    Procedure: AMPUTATION TOE;  Surgeon: Tammi Estrada DPM;  Location: [facility];  Service:            10/11/22 0943   PT Last Visit   PT Visit Date 10/11/22   Note Type   Note type Evaluation   Pain Assessment   Pain Assessment Tool 0-10   Pain Score No Pain   Restrictions/Precautions   Weight Bearing Precautions Per Order No   Other Precautions Contact/isolation; Airborne/isolation;Multiple lines; Fall Risk   Home Living   Type of [address] [place], [place] [address]e One level;Ramped entrance   Bathroom Shower/Tub Walk-in shower   [address] [place] [address]  chair   [address] Walker;Cane;Wheelchair-manual;Other (Comment)  (R prosthetic)   Additional Comments pt reports use of RW while wearing R LE prosthetic; otherwise uses manual w/c   Prior Function   Level of [place] Independent with ADLs; Needs assistance with IADLS   Lives With Spouse; Family   Receives Help From Family   IADLs Family/Friend/Other provides transportation   Falls in the last 6 months 0   Vocational Full time employment   General   Family/Caregiver Present No   Cognition   Overall Cognitive Status WFL   Arousal/Participation Alert   Attention Within functional limits   Orientation Level Oriented X4   Following Commands Follows all commands and directions without difficulty   Subjective   Subjective "The oxygen has been off about 15 minutes"   RLE Assessment   RLE Assessment X  (s/p AKA; residual limb WFL)   LLE Assessment   LLE Assessment WFL   Bed Mobility   Supine to Sit   (pt OOB at start of session)   Sit to Supine 6  Modified independent   Additional items Bedrails   Transfers   Sit to Stand 6  Modified independent   Stand to Sit 6  Modified independent   Additional Comments no device used   Ambulation/Elevation   Gait pattern Not tested   Balance   Static Sitting Good   Dynamic Sitting Good   Static Standing Good   Dynamic Standing Good   Endurance Deficit   Endurance Deficit No   Activity Tolerance   Activity Tolerance Patient tolerated treatment well   Assessment   Prognosis Good   Assessment Patient is a 45 y o  male evaluated by Physical Therapy s/p admit to [facility] on 10/10/2022 with admitting diagnosis of: Cough, ESRD (end stage renal disease) on dialysis, Acute respiratory failure with hypoxia, Pneumonia due to COVID-19 virus, and principal problem of: Acute on chronic respiratory failure with hypoxia  PT was consulted to assess patient's functional mobility and discharge needs  Ordered are PT Evaluation and treatment with activity level of: up and OOB as tolerated  Comorbidities affecting patient's physical performance at time of assessment include: HTN, s/p renal transplant DM, hx of MI, dialysis patient, CAD, HLD, CKD, s/p R AKA  Personal factors affecting the patient at time of IE include: ambulating with assistive device and inability/difficulty performing IADLs   Please locate objective findings from PT assessment regarding body systems outlined above  Upon evaluation, pt able to perform all functional mobility at mod(I) level with manual w/c  Pt reports he is able to ambulate with RW while wearing his R LE prosthetic, however he does not currently have it  Pt also reports wearing prosthetic every other day d/t poor fit; does have prosthetist appointment scheduled in April  HR and SpO2 remained WFL on RA throughout  Pt has no concerns about his functional mobility at this time and is feeling improved since admission  Continued PT intervention not indicated; will d/c PT orders  The patient's AM-PAC Basic Mobility Inpatient Short Form Raw Score is 20  A Raw score of greater than 16 suggests the patient may benefit from discharge to home  Please also refer to the recommendation of the Physical Therapist for safe discharge planning  Co treatment with OT secondary to complex medical condition of pt, possible A of 2 required to achieve and maintain transitional movements, requiring the need of skilled therapeutic intervention of 2 therapists to achieve delivery of services  Goals   Patient Goals to go home   Plan   PT Frequency Other (Comment)  (eval only)   Recommendation   PT Discharge Recommendation No rehabilitation needs   AM-PAC Basic Mobility Inpatient   Turning in Bed Without Bedrails 4   Lying on Back to Sitting on Edge of Flat Bed 4   Moving Bed to Chair 4   Standing Up From Chair 4   Walk in Room 2   Climb 3-5 Stairs 2   Basic Mobility Inpatient Raw Score 20   Basic Mobility Standardized Score 43 99   Highest Level Of Mobility   JH-HLM Goal 6: Walk 10 steps or more   JH-HLM Achieved 4: Move to chair/commode   End of Consult   Patient Position at End of Consult All needs within reach; Supine

## 2022-10-11 NOTE — ASSESSMENT & PLAN NOTE
Lab Results   Component Value Date    EGFR 5 10/11/2022    EGFR 8 10/10/2022    EGFR 7 10/03/2022    CREATININE 9 29 (H) 10/11/2022    CREATININE 7 04 (H) 10/10/2022    CREATININE 7 63 (H) 10/03/2022   · 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  · Undergoes dialysis Tuesday Thursday Saturday  · Nephrology consult  · Continue Tacrolimus 2 mg in morning 1 mg at night  · Due to COVID 19 positive status some challenges with scheduling outpatient hemodialysis, next available treatment on outpatient basis would be this Friday

## 2022-10-11 NOTE — ASSESSMENT & PLAN NOTE
Wt Readings from Last 3 Encounters:   10/11/22 50 4 kg (111 lb 1 8 oz)   10/03/22 52 7 kg (116 lb 2 9 oz)   08/24/22 52 2 kg (115 lb)     · Does not appear to be in acute exacerbation at this time  · ProBNP 8500 which is below patient's recent values above 15,000  · Chest x-ray does not appear to show any evidence of pulmonary volume overload  · Continue Coreg 6 25 mg oral b i d   · Continue to monitor volume status closely

## 2022-10-11 NOTE — ASSESSMENT & PLAN NOTE
· Patient recently tested positive for COVID-19 on a home COVID test   Reports increased shortness of breath, malaise over the past day  · Patient currently requiring 4 L nasal cannula oxygen and saturating at 93% on admission, now weaned to room air  · Please see assessment and plan for pneumonia due to COVID-19 virus

## 2022-10-11 NOTE — OCCUPATIONAL THERAPY NOTE
Occupational Therapy Evaluation     Patient Name: Nicolasa Story  NTAGQ'W Date: 10/11/2022  Problem List  Principal Problem:    Acute on chronic respiratory failure with hypoxia ([facility] )  Active Problems:    Type 1 diabetes mellitus ([facility] )    Renal transplant, status post    Hypertension    ESRD (end stage renal disease) on dialysis [facility])    Coronary artery disease involving native coronary artery of native heart without angina pectoris    Pneumonia due to COVID-19 virus    Chronic diastolic congestive heart failure [facility])    Past Medical History  Past Medical History:   Diagnosis Date    Bacteremia 12/21/2018    CAD (coronary artery disease)     s/p MARC to LCx, pLAD 2/2018    Cardiac arrest ([facility] )     Chronic kidney disease     Diabetes mellitus type 1 ([facility] )     Dialysis patient ([facility] )     Access in right chest    GI bleed     Hyperlipidemia     Hypertension     Infection at site of external fixator pin ([facility] )     MI (myocardial infarction) ([place] [address] )     Pneumonia     Last Assessed [id number]    Renal failure     Renal transplant, status post 07/21/2007     Past Surgical History  Past Surgical History:   Procedure Laterality Date    AMPUTATION Right 02/2019    aboce knee    ANKLE FRACTURE SURGERY      ANKLE HARDWARE REMOVAL Right 7/31/2017    Procedure: REMOVAL HARDWARE ANKLE;  Surgeon: Natali Caputo MD;  [facility]: [facility];  Service: Orthopedics    ANKLE HARDWARE REMOVAL Right 8/17/2017    Procedure: TIBIA FAILED HARDWARE REMOVAL;  Surgeon: Natali Caputo MD;  [facility];  Service: Orthopedics    CLOSED REDUCTION ANKLE Right 7/3/2017    Procedure: CLOSED REDUCTION DISTAL TIB-FIB AND CASTING VS;  Surgeon: Natali Caputo MD;  [facility];  Service: Orthopedics    CORONARY ANGIOPLASTY WITH STENT PLACEMENT  02/2018    CAD s/p MARC to LCx, pLAD    ESOPHAGOGASTRODUODENOSCOPY N/A 12/20/2018    Procedure: ESOPHAGOGASTRODUODENOSCOPY (EGD) in ICU; Surgeon: Tonie Gan MD;  [facility]: [facility];   Service: Gastroenterology    EYE SURGERY      FRACTURE SURGERY      ORIF Rt Ankle    GLUTAMIC ACID DECARBOXYLASE (HISTORICAL)      IR AV FISTULAGRAM/GRAFTOGRAM  4/16/2020    IR PICC LINE  8/20/2018    IR TUNNELED DIALYSIS CATHETER CHECK/CHANGE/REPOSITION/ANGIOPLASTY  1/8/2020    IR TUNNELED DIALYSIS CATHETER PLACEMENT  10/21/2019    IR TUNNELED DIALYSIS CATHETER REMOVAL  5/29/2020    IR VENOUS LINE REMOVAL  10/5/2018    LEG AMPUTATION Right 02/01/2019    Above the knee    NEPHRECTOMY TRANSPLANTED ORGAN      AR ANASTOMOSIS,AV,ANY SITE Right 2/11/2020    Procedure: CREATION FISTULA ARTERIOVENOUS (AV) right upper extremity;  Surgeon: Jeannette Sterling MD;  Location: [address] [place] [address] MAIN OR;  Service: Vascular    AR OPEN TREATMENT FRACTURE DISTAL TIBIA FIBULA Right 7/3/2017    Procedure: OPEN REDUCTION W/ INTERNAL FIXATION (ORIF); Surgeon: Kimberley Hough MD;  [facility]: [facility];  Service: Orthopedics    TOE AMPUTATION Right 10/27/2016    Procedure: AMPUTATION TOE;  Surgeon: Jim Leahy DPM;  [facility]: [facility];  Service:              10/11/22 0941   OT Last Visit   OT Visit Date 10/11/22   Note Type   Note type Evaluation   Pain Assessment   Pain Score No Pain   Restrictions/Precautions   Weight Bearing Precautions Per Order No   Other Precautions Fall Risk;Contact/isolation; Airborne/isolation   Home Living   Type of [address] Two level;Performs ADLs on one level; Able to live on main level with bedroom/bathroom; Other (Comment)  (0 DENNIS; 8 steps to 2nd)   Bathroom Shower/Tub Walk-in shower   Bathroom Toilet Standard   Bathroom Equipment Shower chair   Bathroom Accessibility Accessible   Home Equipment Walker;Cane;Wheelchair-manual;Other (Comment)  (prosthetic and glucometer)   Additional Comments pt performs functional mobility with w/c at this time; awaiting prosthetic appt for new prosthetic fit for R LE   Prior Function   Level of [place] Independent with ADLs;Modified independent with wheelchair;Needs assistance with IADLS   Lives With Spouse; Family   Receives Help From Family   IADLs Family/Friend/Other provides transportation   Falls in the last 6 months 0   Vocational Full time employment   Subjective   Subjective "I have been doing everything myself for a while now"   ADL   Where Assessed Other (Comment)  (bathroom)   Grooming Assistance 6  [address] 6  Modified Independent   LB Bathing Assistance 6  [facility] 115 down in back; Fasteners  ([facility] gown)   LB Dressing Assistance 6  Modified independent   Additional Comments pt seated in w/c at sink and performs ADL performance; requires (A) with hospital gown and setup of items at w/c level   Bed Mobility   Supine to Sit   (seated at sink in w/c performing ADL)   Sit to Supine 6  Modified independent   Additional items Bedrails   Additional Comments pt on RA during session; SPO2 WFL with no complaints of SOB   Transfers   Sit to Stand 6  Modified independent   Stand to Sit 6  Modified independent   Additional Comments pt performs functional transfers to and from w/c with no difficulties and good safety awareness   Functional Mobility   Functional Mobility 6  Modified independent   Additional Comments pt performs functional mobility with w/c and B UE to and from bathroom with no difficulties and does not appear SOB   Balance   Static Sitting Good   Dynamic Sitting Good   Static Standing Good   Dynamic Standing Good   Activity Tolerance   Activity Tolerance Patient tolerated treatment well   RUE Assessment   RUE Assessment WFL   LUE Assessment   LUE Assessment WFL   Hand Function   Gross Motor Coordination Functional   Fine Motor Coordination Functional   Sensation   Light Touch No apparent deficits   Sharp/Dull No apparent deficits   Cognition   Overall Cognitive Status WFL   Arousal/Participation Alert   Attention Within functional limits   Orientation Level Oriented X4   Memory Within functional limits   Following Commands Follows all commands and directions without difficulty   Assessment   Assessment Patient is a 45 y o  male admitted to [address] on 10/10/2022 due to Acute on chronic respiratory failure with hypoxia ([facility] )  Pt performed at (I) level with no OT needs identified at this time Comorbidities affecting pt's physical performance at time of assessment include HTN, renal failure, renal transplant, DM, MI, cardiac arrest, dialysis, CKD, HLD  The patient's raw score on the AM-PAC Daily Activity inpatient short form is 24, standardized score is 57 54, greater than 39 4  Patients at this level are likely to benefit from DC to home  From OT standpoint recommend anticipate no needs upon D/C  No further acute OT needs identified at this time  Recommend continued mobilization with hospital staff and restorative services while in the hospital to increase pt’s endurance and strength upon D/C  From OT standpoint, recommend D/C to home with family support when medically cleared  D/C pt from OT caseload at this time     Goals   Patient Goals to go home   Recommendation   OT Discharge Recommendation No rehabilitation needs   AM-PAC Daily Activity Inpatient   Lower Body Dressing 4   Bathing 4   Toileting 4   Upper Body Dressing 4   Grooming 4   Eating 4   Daily Activity Raw Score 24   Daily Activity Standardized Score (Calc for Raw Score >=11) 57 54   AM-PAC Applied Cognition Inpatient   Following a Speech/Presentation 4   Understanding Ordinary Conversation 4   Taking Medications 4   Remembering Where Things Are Placed or Put Away 4   Remembering List of 4-5 Errands 4   Taking Care of Complicated Tasks 4   Applied Cognition Raw Score 24   Applied Cognition Standardized Score 62 21

## 2022-10-11 NOTE — RESPIRATORY THERAPY NOTE
10/11/22 0941   Respiratory Assessment   Resp Comments pt has been on room air since 0900, pt oxygen with exertion 91+%   Oxygen Therapy/Pulse Ox   O2 Device None (Room air)   O2 Therapy Room air   SpO2 Activity During Exercise

## 2022-10-11 NOTE — ASSESSMENT & PLAN NOTE
Lab Results   Component Value Date    HGBA1C 7 4 (H) 08/21/2022       Steroid induced hyperglycemia, adjust insulin  Patient weaned off supplemental oxygen, will discontinue Decadron and transitioned to oral prednisone tomorrow for a short tapering course

## 2022-10-12 ENCOUNTER — APPOINTMENT (INPATIENT)
Dept: DIALYSIS | Facility: HOSPITAL | Age: 52
DRG: 177 | End: 2022-10-12
Payer: MEDICARE

## 2022-10-12 VITALS
DIASTOLIC BLOOD PRESSURE: 65 MMHG | SYSTOLIC BLOOD PRESSURE: 126 MMHG | RESPIRATION RATE: 16 BRPM | HEIGHT: 64 IN | HEART RATE: 86 BPM | TEMPERATURE: 97.6 F | BODY MASS INDEX: 18.97 KG/M2 | WEIGHT: 111.11 LBS | OXYGEN SATURATION: 91 %

## 2022-10-12 PROBLEM — E11.10 DKA (DIABETIC KETOACIDOSIS) (HCC): Status: RESOLVED | Noted: 2017-09-23 | Resolved: 2022-10-12

## 2022-10-12 LAB
ANION GAP SERPL CALCULATED.3IONS-SCNC: 14 MMOL/L (ref 4–13)
APTT PPP: 66 SECONDS (ref 23–37)
APTT PPP: 67 SECONDS (ref 23–37)
BASOPHILS # BLD AUTO: 0.01 THOUSANDS/ΜL (ref 0–0.1)
BASOPHILS NFR BLD AUTO: 0 % (ref 0–1)
BUN SERPL-MCNC: 38 MG/DL (ref 5–25)
CALCIUM SERPL-MCNC: 7.3 MG/DL (ref 8.3–10.1)
CHLORIDE SERPL-SCNC: 100 MMOL/L (ref 96–108)
CO2 SERPL-SCNC: 25 MMOL/L (ref 21–32)
CREAT SERPL-MCNC: 6.62 MG/DL (ref 0.6–1.3)
EOSINOPHIL # BLD AUTO: 0.08 THOUSAND/ΜL (ref 0–0.61)
EOSINOPHIL NFR BLD AUTO: 1 % (ref 0–6)
ERYTHROCYTE [DISTWIDTH] IN BLOOD BY AUTOMATED COUNT: 14.2 % (ref 11.6–15.1)
GFR SERPL CREATININE-BSD FRML MDRD: 8 ML/MIN/1.73SQ M
GLUCOSE SERPL-MCNC: 131 MG/DL (ref 65–140)
GLUCOSE SERPL-MCNC: 156 MG/DL (ref 65–140)
GLUCOSE SERPL-MCNC: 157 MG/DL (ref 65–140)
GLUCOSE SERPL-MCNC: 165 MG/DL (ref 65–140)
GLUCOSE SERPL-MCNC: 189 MG/DL (ref 65–140)
HCT VFR BLD AUTO: 28 % (ref 36.5–49.3)
HGB BLD-MCNC: 9.6 G/DL (ref 12–17)
IMM GRANULOCYTES # BLD AUTO: 0.07 THOUSAND/UL (ref 0–0.2)
IMM GRANULOCYTES NFR BLD AUTO: 1 % (ref 0–2)
LYMPHOCYTES # BLD AUTO: 2.47 THOUSANDS/ΜL (ref 0.6–4.47)
LYMPHOCYTES NFR BLD AUTO: 42 % (ref 14–44)
MCH RBC QN AUTO: 34.8 PG (ref 26.8–34.3)
MCHC RBC AUTO-ENTMCNC: 34.3 G/DL (ref 31.4–37.4)
MCV RBC AUTO: 101 FL (ref 82–98)
MONOCYTES # BLD AUTO: 0.39 THOUSAND/ΜL (ref 0.17–1.22)
MONOCYTES NFR BLD AUTO: 7 % (ref 4–12)
NEUTROPHILS # BLD AUTO: 2.92 THOUSANDS/ΜL (ref 1.85–7.62)
NEUTS SEG NFR BLD AUTO: 49 % (ref 43–75)
NRBC BLD AUTO-RTO: 0 /100 WBCS
PLATELET # BLD AUTO: 177 THOUSANDS/UL (ref 149–390)
PMV BLD AUTO: 9.2 FL (ref 8.9–12.7)
POTASSIUM SERPL-SCNC: 3.7 MMOL/L (ref 3.5–5.3)
RBC # BLD AUTO: 2.76 MILLION/UL (ref 3.88–5.62)
SODIUM SERPL-SCNC: 139 MMOL/L (ref 135–147)
WBC # BLD AUTO: 5.94 THOUSAND/UL (ref 4.31–10.16)

## 2022-10-12 PROCEDURE — 94761 N-INVAS EAR/PLS OXIMETRY MLT: CPT

## 2022-10-12 PROCEDURE — 90935 HEMODIALYSIS ONE EVALUATION: CPT | Performed by: NURSE PRACTITIONER

## 2022-10-12 PROCEDURE — 85025 COMPLETE CBC W/AUTO DIFF WBC: CPT | Performed by: FAMILY MEDICINE

## 2022-10-12 PROCEDURE — 99239 HOSP IP/OBS DSCHRG MGMT >30: CPT | Performed by: FAMILY MEDICINE

## 2022-10-12 PROCEDURE — 82948 REAGENT STRIP/BLOOD GLUCOSE: CPT

## 2022-10-12 PROCEDURE — 85730 THROMBOPLASTIN TIME PARTIAL: CPT | Performed by: FAMILY MEDICINE

## 2022-10-12 PROCEDURE — 80048 BASIC METABOLIC PNL TOTAL CA: CPT | Performed by: FAMILY MEDICINE

## 2022-10-12 RX ORDER — PREDNISONE 10 MG/1
TABLET ORAL
Qty: 20 TABLET | Refills: 0 | Status: SHIPPED | OUTPATIENT
Start: 2022-10-12 | End: 2022-10-20

## 2022-10-12 RX ADMIN — CARVEDILOL 6.25 MG: 3.12 TABLET, FILM COATED ORAL at 08:09

## 2022-10-12 RX ADMIN — INSULIN LISPRO 1 UNITS: 100 INJECTION, SOLUTION INTRAVENOUS; SUBCUTANEOUS at 08:11

## 2022-10-12 RX ADMIN — ISOSORBIDE MONONITRATE 30 MG: 30 TABLET, EXTENDED RELEASE ORAL at 08:10

## 2022-10-12 RX ADMIN — PANTOPRAZOLE SODIUM 40 MG: 40 TABLET, DELAYED RELEASE ORAL at 08:09

## 2022-10-12 RX ADMIN — CALCIUM ACETATE 667 MG: 667 CAPSULE ORAL at 13:31

## 2022-10-12 RX ADMIN — HEPARIN SODIUM 10 UNITS/KG/HR: 10000 INJECTION, SOLUTION INTRAVENOUS at 03:36

## 2022-10-12 RX ADMIN — NIFEDIPINE 30 MG: 30 TABLET, FILM COATED, EXTENDED RELEASE ORAL at 08:09

## 2022-10-12 RX ADMIN — DESMOPRESSIN ACETATE 80 MG: 0.2 TABLET ORAL at 08:09

## 2022-10-12 RX ADMIN — INSULIN LISPRO 5 UNITS: 100 INJECTION, SOLUTION INTRAVENOUS; SUBCUTANEOUS at 08:11

## 2022-10-12 RX ADMIN — ASPIRIN 81 MG CHEWABLE TABLET 81 MG: 81 TABLET CHEWABLE at 08:09

## 2022-10-12 RX ADMIN — CALCIUM ACETATE 667 MG: 667 CAPSULE ORAL at 08:09

## 2022-10-12 RX ADMIN — INSULIN GLARGINE 15 UNITS: 100 INJECTION, SOLUTION SUBCUTANEOUS at 08:10

## 2022-10-12 RX ADMIN — AZATHIOPRINE 50 MG: 50 TABLET ORAL at 08:10

## 2022-10-12 RX ADMIN — REMDESIVIR 100 MG: 100 INJECTION, POWDER, LYOPHILIZED, FOR SOLUTION INTRAVENOUS at 08:14

## 2022-10-12 RX ADMIN — PREDNISONE 40 MG: 20 TABLET ORAL at 08:09

## 2022-10-12 RX ADMIN — TACROLIMUS 2 MG: 1 CAPSULE ORAL at 08:14

## 2022-10-12 NOTE — ASSESSMENT & PLAN NOTE
· Patient recently tested positive for COVID-19 on a home COVID test   Reports increased shortness of breath, malaise over the past day  · Patient currently requiring 4 L nasal cannula oxygen and saturating at 93% on admission, now weaned to room air  · CTA of the chest was negative for PE, shows improvement of recent aspiration pneumonia  · Please see assessment and plan for pneumonia due to COVID-19 virus

## 2022-10-12 NOTE — PROGRESS NOTES
NEPHROLOGY PROGRESS NOTE   Vishal Howe 45 y o  male MRN: [id number]  Unit/Bed#: 407-01 Encounter: [id number]    Assessment/Plan:    HEMODIALYSIS PROCEDURE NOTE  The patient was seen and examined on hemodialysis  Time: 2 hours  Sodium: 135 Blood flow: 3400   Dialyzer: F160 Potassium: 3k Dialysate flow: 600   Access: avf Bicarbonate: 30 Ultrafiltration goal: 500 net   Medications on HD: none        79-year-old man with ESRD on hemodialysis, history of L DDRT 2001, chronic immunosuppression, type 1 diabetes mellitus, BKA, recent hospitalization for gastroenteritis an aspiration pneumonia presented 10/10 chief complaint shortness of breath being treated for acute hypoxic respiratory failure in the setting of COVID-19 pneumonia      1  ESRD on maintenance hemodialysis Tuesday, Thursday, Saturday at [facility]  ? Undergoing 2 hour hemodialysis session as above for net 500 mL ultrafiltration  He will maintain a Monday, Wednesday, Friday schedule at [address] and then return to his typical outpatient clinic 10/20/2022  ? Target weight 47 5 kg  ? Continue phosphorus and fluid restricted diet  2  History of LDRT 2001  ? Continue Prograf in in urine  3  Mineral bone disease  ? Continue Sensipar 30 mg, calcitriol 0 25 mcg with hemodialysis, PhosLo with meals  4  Anemia of ESRD  ? Avoiding CAPRICE during hospitalization for COVID due to potential prothrombotic state  5  Type 1 diabetes mellitus  ? Blood sugar management per primary team  6  Acute hypoxic respiratory failure secondary to COVID-19 pneumonia  ? CTA negative for PE  Management per primary team   Will ultrafiltrate as above           ROS  Examined lying in bed on dialysis as above  No physical complaints  A complete 10 point review of systems have been performed and are otherwise negative         Historical Information   Past Medical History:   Diagnosis Date   • Bacteremia 12/21/2018   • CAD (coronary artery disease)     s/p MARC to LCx, pLAD 2/2018 • Cardiac arrest [facility])    • Chronic kidney disease    • Diabetes mellitus type 1 ([facility] [address] )    • Dialysis patient [facility])     Access in right chest   • GI bleed    • Hyperlipidemia    • Hypertension    • Infection at site of external fixator pin [facility])    • MI (myocardial infarction) ([facility] [address] )    • Pneumonia     Last Assessed [id number]   • Renal failure    • Renal transplant, status post 07/21/2007     Past Surgical History:   Procedure Laterality Date   • AMPUTATION Right 02/2019    aboce knee   • ANKLE FRACTURE SURGERY     • ANKLE HARDWARE REMOVAL Right 7/31/2017    Procedure: REMOVAL HARDWARE ANKLE;  Surgeon: Yaquelin Yoon MD;  Location: [facility] MAIN [facility];  Service: Orthopedics   • ANKLE HARDWARE REMOVAL Right 8/17/2017    Procedure: TIBIA FAILED HARDWARE REMOVAL;  Surgeon: Yaquelin Yoon MD;  Location: [facility] MA[facility];  Service: Orthopedics   • CLOSED REDUCTION ANKLE Right 7/3/2017    Procedure: CLOSED REDUCTION DISTAL TIB-FIB AND CASTING VS;  Surgeon: Yaquelin Yoon MD;  Location: [facility] MA[facility] OR;  Service: Orthopedics   • CORONARY ANGIOPLASTY WITH STENT PLACEMENT  02/2018    CAD s/p MARC to LCx, pLAD   • ESOPHAGOGASTRODUODENOSCOPY N/A 12/20/2018    Procedure: ESOPHAGOGASTRODUODENOSCOPY (EGD) in ICU; Surgeon: Issac Abbott MD;  Location: [facility] GI LAB;   Service: Gastroenterology   • EYE SURGERY     • FRACTURE SURGERY      ORIF Rt Ankle   • GLUTAMIC ACID DECARBOXYLASE (HISTORICAL)     • IR AV FISTULAGRAM/GRAFTOGRAM  4/16/2020   • IR PICC LINE  8/20/2018   • IR TUNNELED DIALYSIS CATHETER CHECK/CHANGE/REPOSITION/ANGIOPLASTY  1/8/2020   • IR TUNNELED DIALYSIS CATHETER PLACEMENT  10/21/2019   • IR TUNNELED DIALYSIS CATHETER REMOVAL  5/29/2020   • IR VENOUS LINE REMOVAL  10/5/2018   • LEG AMPUTATION Right 02/01/2019    Above the knee   • NEPHRECTOMY TRANSPLANTED ORGAN     • FL ANASTOMOSIS,AV,ANY SITE Right 2/11/2020    Procedure: CREATION FISTULA ARTERIOVENOUS (AV) right upper extremity;  Surgeon: Hallie Youssef MD;  Location: [address] [place] [address] MAIN OR; Service: Vascular   • RI OPEN TREATMENT FRACTURE DISTAL TIBIA FIBULA Right 7/3/2017    Procedure: OPEN REDUCTION W/ INTERNAL FIXATION (ORIF);   Surgeon: Natali Caputo MD;  Location: [facility] MA[facility];  Service: Orthopedics   • TOE AMPUTATION Right 10/27/2016    Procedure: AMPUTATION TOE;  Surgeon: Solo Denton DPM;  Location: [facility] MA[facility];  Service:      Social History   Social History     Substance and Sexual Activity   Alcohol Use Not Currently   • Alcohol/week: 0 0 standard drinks     Social History     Substance and Sexual Activity   Drug Use Never     Social History     Tobacco Use   Smoking Status Never Smoker   Smokeless Tobacco Never Used       Family History:   Family History   Problem Relation Age of Onset   • Diabetes Brother    • Coronary artery disease Mother    • No Known Problems Father        Medications:  Pertinent medications were reviewed  Current Facility-Administered Medications   Medication Dose Route Frequency Provider Last Rate   • acetaminophen  650 mg Oral Q6H PRN Yelena Cox MD     • albuterol  2 5 mg Nebulization Q6H PRN Polina Mendel Chase, MD     • aspirin  81 mg Oral QAM Lew Lai PA-C     • atorvastatin  80 mg Oral QAM Lew Lai PA-C     • azaTHIOprine  50 mg Oral Daily Lew Lai PA-C     • calcitriol  0 25 mcg Oral Once per day on Tue Thu Sat GAVINO Masters     • calcium acetate  667 mg Oral TID With Meals GAVINO Masters     • calcium carbonate  750 mg Oral After Dialysis GAVINO Masters     • carvedilol  6 25 mg Oral BID With Meals Lew Lai PA-C     • cinacalcet  30 mg Oral Once per day on Tue Thu Sat GAVINO Masters     • heparin (porcine)  3-30 Units/kg/hr (Order-Specific) Intravenous Titrated Rosalino Zayas MD 10 Units/kg/hr (10/12/22 033)   • heparin (porcine)  2,000 Units Intravenous Q1H PRN Ernestine Barboza MD     • heparin (porcine)  4,000 Units Intravenous Q1H PRN Ernestine Barboza MD     • insulin glargine  15 Units Subcutaneous Q12H [facility] Yelena Cox MD     • insulin lispro  1-5 Units Subcutaneous TID AC Abdulkadir Robles PA-C     • insulin lispro  5 Units Subcutaneous TID With Meals Yelena Reid MD     • isosorbide mononitrate  30 mg Oral Daily Samantha Finn PA-C     • midodrine  5 mg Oral TID PRN Samantha Finn PA-C     • NIFEdipine  30 mg Oral Daily Samantha Finn PA-C     • ondansetron  4 mg Intravenous Q6H PRN Samantha Finn PA-C     • pantoprazole  40 mg Oral Daily Samantha Finn PA-C     • predniSONE  40 mg Oral Daily Yelena Reid MD     • remdesivir  100 mg Intravenous Q24H Ofelia Co, DO     • tacrolimus  1 mg Oral HS Samantha Finn PA-C     • tacrolimus  2 mg Oral Daily Abdulkadir Robles PA-C           No Known Allergies      Vitals:   /85   Pulse 79   Temp 97 6 °F (36 4 °C)   Resp 18   Ht 5' 4" (1 626 m)   Wt 50 4 kg (111 lb 1 8 oz) Comment: refused to stand for weight, taken in bed  SpO2 97%   BMI 19 07 kg/m²   Body mass index is 19 07 kg/m²    SpO2: 97 %,   SpO2 Activity: At Rest,   O2 Device: Nasal cannula      Intake/Output Summary (Last 24 hours) at 10/12/2022 4931  Last data filed at 10/12/2022 0910  Gross per 24 hour   Intake 980 ml   Output 1500 ml   Net -520 ml     Invasive Devices  Report    Peripheral Intravenous Line  Duration           Peripheral IV 10/10/22 Left Antecubital 2 days    Peripheral IV 10/10/22 Left Forearm 2 days          Line  Duration           Hemodialysis AV Fistula Right Upper arm -- days                Physical Exam  General: conscious, cooperative, in no acute distress  Eyes: conjunctivae pink, anicteric sclerae  ENT: lips and mucous membranes moist  Neck: supple, no JVD, no masses  Chest:  Diminished breath sounds bilaterally, no crackles, ronchus or wheezings on nasal cannula  CVS: S1 & S2, normal rate, regular rhythm  Abdomen: soft, non-tender, non-distended, normoactive bowel sounds  Extremities: no edema of both legs BKA  Skin: no rash  Neuro: awake, alert, oriented      Diagnostic Data:  Lab: I have personally reviewed pertinent lab results  ,   CBC:  Results from last 7 days   Lab Units 10/12/22  0739   WBC Thousand/uL 5 94   HEMOGLOBIN g/dL 9 6*   HEMATOCRIT % 28 0*   PLATELETS Thousands/uL 177      CMP:   Lab Results   Component Value Date    SODIUM 139 10/12/2022    K 3 7 10/12/2022     10/12/2022    CO2 25 10/12/2022    BUN 38 (H) 10/12/2022    CREATININE 6 62 (H) 10/12/2022    CALCIUM 7 3 (L) 10/12/2022    EGFR 8 10/12/2022   ,   PT/INR: No results found for: PT, INR,   Magnesium: No components found for: MAG,  Phosphorous: No results found for: PHOS    Microbiology:  @LABRCNT,(urinecx:7)@        Marlou Severance    Portions of the record may have been created with voice recognition software  Occasional wrong word or "sound a like" substitutions may have occurred due to the inherent limitations of voice recognition software  Read the chart carefully and recognize, using context, where substitutions have occurred

## 2022-10-12 NOTE — ASSESSMENT & PLAN NOTE
· Patient has history of failed renal transplant now with ESRD    Currently on Tuesday Thursday Saturday dialysis  · Additional treatment today, the patient will be set up for dialysis on Friday due to his COVID positive status  · Nephrology consulted  · Continue home dose of tacrolimus

## 2022-10-12 NOTE — ASSESSMENT & PLAN NOTE
Lab Results   Component Value Date    HGBA1C 7 4 (H) 08/21/2022     Controlled  Steroid induced hyperglycemia, insulin was adjusted  Resume home dosing

## 2022-10-12 NOTE — ASSESSMENT & PLAN NOTE
· Patient presents with 1 day of increased shortness of breath, malaise  His wife tested positive on Saturday for [place]  He tested positive on home COVID test today, confirmed positive COVID-19 status on admission  · Procalcitonin elevated at 2 28, however significantly decreased from prior with recent aspiration pneumonia, CT of the chest also showed improvement of the infiltrates  · CRP elevated at 11 9, trending down  · D-dimer elevated at 2 20->0 7  · Chest x-ray does appear to show patchy infiltrates  · Initiated on Decadron 6 mg IV Q 24 - with resolution of hypoxia and significant steroid induced hyperglycemia was changed to prednisone, discharged on a prednisone taper   · Received IV remdesivir during hospitalization   · Due to elevated D-dimer, initiated on VTE/PE heparin drip  CTA of the chest negative for PE  D-dimer trended down, weaned to room air   No additional anticoagulation  · Blood cultures no growth to date

## 2022-10-12 NOTE — ASSESSMENT & PLAN NOTE
Wt Readings from Last 3 Encounters:   10/11/22 50 4 kg (111 lb 1 8 oz)   10/03/22 52 7 kg (116 lb 2 9 oz)   08/24/22 52 2 kg (115 lb)     · Does not appear to be in acute exacerbation at this time  · Volume management with dialysis, underwent treatment today prior to discharge

## 2022-10-12 NOTE — DISCHARGE SUMMARY
[address] [facility] [address] [place]  Discharge- Cristela Ordonez 1970, 45 y o  male MRN: [id number]  Unit/Bed#: 407-01 Encounter: [id number]  Primary Care Provider: Janny Person, DO   Date and time admitted to hospital: 10/10/2022  4:58 AM    * Acute respiratory failure with hypoxia [facility])  Assessment & Plan  · Patient recently tested positive for COVID-19 on a home COVID test   Reports increased shortness of breath, malaise over the past day  · Patient currently requiring 4 L nasal cannula oxygen and saturating at 93% on admission, now weaned to room air  · CTA of the chest was negative for PE, shows improvement of recent aspiration pneumonia  · Please see assessment and plan for pneumonia due to COVID-19 virus      Chronic diastolic congestive heart failure (HCC)  Assessment & Plan  Wt Readings from Last 3 Encounters:   10/11/22 50 4 kg (111 lb 1 8 oz)   10/03/22 52 7 kg (116 lb 2 9 oz)   08/24/22 52 2 kg (115 lb)     · Does not appear to be in acute exacerbation at this time  · Volume management with dialysis, underwent treatment today prior to discharge      Pneumonia due to COVID-19 virus  Assessment & Plan  · Patient presents with 1 day of increased shortness of breath, malaise  His wife tested positive on Saturday for [place]  He tested positive on home COVID test today, confirmed positive COVID-19 status on admission  · Procalcitonin elevated at 2 28, however significantly decreased from prior with recent aspiration pneumonia, CT of the chest also showed improvement of the infiltrates  · CRP elevated at 11 9, trending down  · D-dimer elevated at 2 20->0 7  · Chest x-ray does appear to show patchy infiltrates  · Initiated on Decadron 6 mg IV Q 24 - with resolution of hypoxia and significant steroid induced hyperglycemia was changed to prednisone, discharged on a prednisone taper   · Received IV remdesivir during hospitalization   · Due to elevated D-dimer, initiated on VTE/PE heparin drip    CTA of the chest negative for PE  D-dimer trended down, weaned to room air  No additional anticoagulation  · Blood cultures no growth to date      Pancreatic lesion  Assessment & Plan  Per CTA, "Multiple cystic pancreatic lesions are seen in the body of the pancreas with largest measuring about 1 6 cm in the head, Also visible on the previous study probably IPMN , can be assessed with the nonemergent MRI"  Outpatient referral to GI is placed     Coronary artery disease involving native coronary artery of native heart without angina pectoris  Assessment & Plan  · Continue Coreg 6 25 mg oral b i d , Imdur 30 mg oral daily and nifedipine 30 mg oral daily    ESRD (end stage renal disease) on dialysis [facility])  Assessment & Plan  Lab Results   Component Value Date    EGFR 8 10/12/2022    EGFR 5 10/11/2022    EGFR 8 10/10/2022    CREATININE 6 62 (H) 10/12/2022    CREATININE 9 29 (H) 10/11/2022    CREATININE 7 04 (H) 10/10/2022   · 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  · Undergoes dialysis Tuesday Thursday Saturday  · Nephrology consult  · Continue Tacrolimus 2 mg in morning 1 mg at night  · Due to COVID 19 positive status some challenges with scheduling outpatient hemodialysis, next available treatment on outpatient basis would be this Friday      Pulmonary hypertension ([facility] [address] )  Assessment & Plan  Volume management with dialysis    Hypertension  Assessment & Plan  · Controlled   · Continue Coreg 6 25 mg oral b i d  and nifedipine 30 mg oral daily    Renal transplant, status post  Assessment & Plan  · Patient has history of failed renal transplant now with ESRD    Currently on Tuesday Thursday Saturday dialysis  · Additional treatment today, the patient will be set up for dialysis on Friday due to his COVID positive status  · Nephrology consulted  · Continue home dose of tacrolimus     Type 1 diabetes mellitus [facility])  Assessment & Plan    Lab Results   Component Value Date    HGBA1C 7 4 (H) 08/21/2022 Controlled  Steroid induced hyperglycemia, insulin was adjusted  Resume home dosing        Medical Problems             Resolved Problems  Date Reviewed: 10/12/2022   None               Discharging Physician / Practitioner: Taylor Holman  PCP: Monika Granados DO  Admission Date:   Admission Orders (From admission, onward)     Ordered        10/10/22 0623  INPATIENT ADMISSION  Once            10/10/22 0418  Inpatient Admission  Once                      Discharge Date: 10/12/22    Consultations During Hospital Stay:  · Nephrology     Procedures Performed:   CTA chest pe study   Final Result by Sharon Felix MD (10/11 3727)      No pulmonary embolism seen      Resolving the bibasilar patchy opacity related to aspiration   Residual patchy densities and atelectasis noted at the both lung bases with some residual groundglass densities   Dilated esophagus, evaluate for reflux/achalasia      Opacification of the posterior basilar segment of the right lower lobe bronchus, can be assessed at follow-up chest CT in 2 months to demonstrate resolution and to exclude any endobronchial abnormality      Multiple cystic pancreatic lesions are seen in the body of the pancreas with largest measuring about 1 6 cm in the head, Also visible on the previous study probably IPMN , can be assessed with the nonemergent MRI   The study was marked in EPIC for immediate notification  Workstation performed: [id number]         XR chest portable   ED Interpretation by Leidy Iraheta DO (10/10 0436)   Diffuse patchy infiltrate, known COVID19      Final Result by Coby Carter MD (10/10 1064)      Improving bilateral lower lobe pneumonia, present on the chest CT from 10/1/2022                    Workstation performed: [id number]         VAS lower limb venous duplex study, unilateral/limited    (Results Pending)         Significant Findings / Test Results:   Results from last 7 days   Lab Units 10/12/22  0739   WBC Thousand/uL 5 94   HEMOGLOBIN g/dL 9 6*   HEMATOCRIT % 28 0*   PLATELETS Thousands/uL 177     Results from last 7 days   Lab Units 10/12/22  0739   SODIUM mmol/L 139   POTASSIUM mmol/L 3 7   CHLORIDE mmol/L 100   CO2 mmol/L 25   BUN mg/dL 38*   CREATININE mg/dL 6 62*   CALCIUM mg/dL 7 3*         Incidental Findings:   · None     Test Results Pending at Discharge (will require follow up): · None     Outpatient Tests Requested:  · None    Complications:  None    Reason for Admission: SOB, COVID + contact     Hospital Course:   Abelardo Cast is a 45 y o  male patient who originally presented to the hospital on 10/10/2022 due to SOB  Found to a mild respiratory failure secondary to COVID pneumonia  Initially requiring 4 L oxygen later weaned to room air  The patient was noted for an elevated D-dimer and initiated on a heparin drip  With a CT of the chest negative for PE and D-dimer trending down, no additional anticoagulation on discharge  Patient is discharged in improved and stable condition  He is to follow-up with PCP and specialists as previously  Patient's next dialysis treatment is scheduled for this Friday  Please see above list of diagnoses and related plan for additional information  Condition at Discharge: good    Discharge Day Visit / Exam:     Subjective:  Patient seen and examined  He reports feeling well and is requesting to be discharged home  He denies shortness of breath  He is weaned off oxygen and is on room air with appropriate oxygen saturations, confirmed with respiratory therapist   He is tolerating normal diet      Vitals: Blood Pressure: 126/65 (10/12/22 1100)  Pulse: 81 (10/12/22 1100)  Temperature: 97 6 °F (36 4 °C) (10/12/22 0910)  Temp Source: Oral (10/11/22 2337)  Respirations: 16 (10/12/22 1100)  Height: 5' 4" (162 6 cm) (10/10/22 0717)  Weight - Scale: 50 4 kg (111 lb 1 8 oz) (refused to stand for weight, taken in bed) (10/11/22 0546)  SpO2: 91 % (10/12/22 1220)    Exam:     Physical Exam  Constitutional:       General: He is not in acute distress  HENT:      Head: Normocephalic and atraumatic  Mouth/Throat:      Pharynx: Oropharynx is clear  Eyes:      Conjunctiva/sclera: Conjunctivae normal    Cardiovascular:      Rate and Rhythm: Normal rate and regular rhythm  Heart sounds: No murmur heard  Pulmonary:      Effort: No respiratory distress  Breath sounds: No wheezing or rales  Abdominal:      General: There is no distension  Tenderness: There is no abdominal tenderness  There is no guarding  Musculoskeletal:         General: Deformity (R BKA) present  Skin:     General: Skin is warm and dry  Neurological:      Mental Status: He is oriented to person, place, and time  Psychiatric:         Mood and Affect: Mood normal             Discharge instructions/Information to patient and family:   See after visit summary for information provided to patient and family  Provisions for Follow-Up Care:  See after visit summary for information related to follow-up care and any pertinent home health orders  Disposition:   Home    Planned Readmission: No     Discharge Statement:  I spent 35 minutes discharging the patient  This time was spent on the day of discharge  I had direct contact with the patient on the day of discharge  Greater than 50% of the total time was spent examining patient, answering all patient questions, arranging and discussing plan of care with patient as well as directly providing post-discharge instructions  Additional time then spent on discharge activities  Discharge Medications:  See after visit summary for reconciled discharge medications provided to patient and/or family        **Please Note: This note may have been constructed using a voice recognition system**

## 2022-10-12 NOTE — PLAN OF CARE
Problem: PAIN - ADULT  Goal: Verbalizes/displays adequate comfort level or baseline comfort level  Description: Interventions:  - Encourage patient to monitor pain and request assistance  - Assess pain using appropriate pain scale  - Administer analgesics based on type and severity of pain and evaluate response  - Implement non-pharmacological measures as appropriate and evaluate response  - Consider cultural and social influences on pain and pain management  - Notify physician/advanced practitioner if interventions unsuccessful or patient reports new pain  Outcome: Progressing     Problem: INFECTION - ADULT  Goal: Absence or prevention of progression during hospitalization  Description: INTERVENTIONS:  - Assess and monitor for signs and symptoms of infection  - Monitor lab/diagnostic results  - Monitor all insertion sites, i e  indwelling lines, tubes, and drains  - Monitor endotracheal if appropriate and nasal secretions for changes in amount and color  - [place] appropriate cooling/warming therapies per order  - Administer medications as ordered  - Instruct and encourage patient and family to use good hand hygiene technique  - Identify and instruct in appropriate isolation precautions for identified infection/condition  Outcome: Progressing  Goal: Absence of fever/infection during neutropenic period  Description: INTERVENTIONS:  - Monitor WBC    Outcome: Progressing     Problem: SAFETY ADULT  Goal: Patient will remain free of falls  Description: INTERVENTIONS:  - Educate patient/family on patient safety including physical limitations  - Instruct patient to call for assistance with activity   - Consult OT/PT to assist with strengthening/mobility   - Keep Call bell within reach  - Keep bed low and locked with side rails adjusted as appropriate  - Keep care items and personal belongings within reach  - Initiate and maintain comfort rounds  - Make Fall Risk Sign visible to staff  - Offer Toileting every two Hours, in advance of need  - Initiate/Maintain bed alarm  - Obtain necessary fall risk management equipment: non slip socks   - Apply yellow socks and bracelet for high fall risk patients  - Consider moving patient to room near nurses station  Outcome: Progressing  Goal: Maintain or return to baseline ADL function  Description: INTERVENTIONS:  -  Assess patient's ability to carry out ADLs; assess patient's baseline for ADL function and identify physical deficits which impact ability to perform ADLs (bathing, care of mouth/teeth, toileting, grooming, dressing, etc )  - Assess/evaluate cause of self-care deficits   - Assess range of motion  - Assess patient's mobility; develop plan if impaired  - Assess patient's need for assistive devices and provide as appropriate  - Encourage maximum independence but intervene and supervise when necessary  - Involve family in performance of ADLs  - Assess for home care needs following discharge   - Consider OT consult to assist with ADL evaluation and planning for discharge  - Provide patient education as appropriate  Outcome: Progressing  Goal: Maintains/Returns to pre admission functional level  Description: INTERVENTIONS:  - Perform BMAT or MOVE assessment daily    - Set and communicate daily mobility goal to care team and patient/family/caregiver  - Collaborate with rehabilitation services on mobility goals if consulted  - Perform Range of Motion three times a day  - Reposition patient every two  hours    - Dangle patient three times a day  - Stand patient three times a day  - Ambulate patient three times a day  - Out of bed to chair three times a day   - Out of bed for meals three  times a day  - Out of bed for toileting  - Record patient progress and toleration of activity level   Outcome: Progressing     Problem: DISCHARGE PLANNING  Goal: Discharge to home or other facility with appropriate resources  Description: INTERVENTIONS:  - Identify barriers to discharge w/patient and caregiver  - Arrange for needed discharge resources and transportation as appropriate  - Identify discharge learning needs (meds, wound care, etc )  - Arrange for interpretive services to assist at discharge as needed  - Refer to Case Management Department for coordinating discharge planning if the patient needs post-hospital services based on physician/advanced practitioner order or complex needs related to functional status, cognitive ability, or social support system  Outcome: Progressing     Problem: Knowledge Deficit  Goal: Patient/family/caregiver demonstrates understanding of disease process, treatment plan, medications, and discharge instructions  Description: Complete learning assessment and assess knowledge base    Interventions:  - Provide teaching at level of understanding  - Provide teaching via preferred learning methods  Outcome: Progressing     Problem: RESPIRATORY - ADULT  Goal: Achieves optimal ventilation and oxygenation  Description: INTERVENTIONS:  - Assess for changes in respiratory status  - Assess for changes in mentation and behavior  - Position to facilitate oxygenation and minimize respiratory effort  - Oxygen administered by appropriate delivery if ordered  - Initiate smoking cessation education as indicated  - Encourage broncho-pulmonary hygiene including cough, deep breathe, Incentive Spirometry  - Assess the need for suctioning and aspirate as needed  - Assess and instruct to report SOB or any respiratory difficulty  - Respiratory Therapy support as indicated  Outcome: Progressing     Problem: METABOLIC, FLUID AND ELECTROLYTES - ADULT  Goal: Electrolytes maintained within normal limits  Description: INTERVENTIONS:  - Monitor labs and assess patient for signs and symptoms of electrolyte imbalances  - Administer electrolyte replacement as ordered  - Monitor response to electrolyte replacements, including repeat lab results as appropriate  - Instruct patient on fluid and nutrition as appropriate  Outcome: Progressing  Goal: Fluid balance maintained  Description: INTERVENTIONS:  - Monitor labs   - Monitor I/O and WT  - Instruct patient on fluid and nutrition as appropriate  - Assess for signs & symptoms of volume excess or deficit  Outcome: Progressing     Problem: Potential for Falls  Goal: Patient will remain free of falls  Description: INTERVENTIONS:  - Educate patient/family on patient safety including physical limitations  - Instruct patient to call for assistance with activity   - Consult OT/PT to assist with strengthening/mobility   - Keep Call bell within reach  - Keep bed low and locked with side rails adjusted as appropriate  - Keep care items and personal belongings within reach  - Initiate and maintain comfort rounds  - Make Fall Risk Sign visible to staff  - Offer Toileting every two  Hours, in advance of need  - Initiate/Maintain bed alarm  - Obtain necessary fall risk management equipment: non slip socks   - Apply yellow socks and bracelet for high fall risk patients  - Consider moving patient to room near nurses station  Outcome: Progressing     Problem: MOBILITY - ADULT  Goal: Maintain or return to baseline ADL function  Description: INTERVENTIONS:  -  Assess patient's ability to carry out ADLs; assess patient's baseline for ADL function and identify physical deficits which impact ability to perform ADLs (bathing, care of mouth/teeth, toileting, grooming, dressing, etc )  - Assess/evaluate cause of self-care deficits   - Assess range of motion  - Assess patient's mobility; develop plan if impaired  - Assess patient's need for assistive devices and provide as appropriate  - Encourage maximum independence but intervene and supervise when necessary  - Involve family in performance of ADLs  - Assess for home care needs following discharge   - Consider OT consult to assist with ADL evaluation and planning for discharge  - Provide patient education as appropriate  Outcome: Progressing  Goal: Maintains/Returns to pre admission functional level  Description: INTERVENTIONS:  - Perform BMAT or MOVE assessment daily    - Set and communicate daily mobility goal to care team and patient/family/caregiver  - Collaborate with rehabilitation services on mobility goals if consulted  - Perform Range of Motion three times a day  - Reposition patient every two hours    - Dangle patient three times a day  - Stand patient three times a day  - Ambulate patient three times a day  - Out of bed to chair three times a day   - Out of bed for meals three times a day  - Out of bed for toileting  - Record patient progress and toleration of activity level   Outcome: Progressing

## 2022-10-12 NOTE — ASSESSMENT & PLAN NOTE
Lab Results   Component Value Date    EGFR 8 10/12/2022    EGFR 5 10/11/2022    EGFR 8 10/10/2022    CREATININE 6 62 (H) 10/12/2022    CREATININE 9 29 (H) 10/11/2022    CREATININE 7 04 (H) 10/10/2022   · 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  · Undergoes dialysis Tuesday Thursday Saturday  · Nephrology consult  · Continue Tacrolimus 2 mg in morning 1 mg at night  · Due to COVID 19 positive status some challenges with scheduling outpatient hemodialysis, next available treatment on outpatient basis would be this Friday

## 2022-10-12 NOTE — ASSESSMENT & PLAN NOTE
Per CTA, "Multiple cystic pancreatic lesions are seen in the body of the pancreas with largest measuring about 1 6 cm in the head, Also visible on the previous study probably IPMN , can be assessed with the nonemergent MRI"  Outpatient referral to GI is placed

## 2022-10-12 NOTE — CASE MANAGEMENT
"Chief complaint  Addisons Disease (Follow Up), Hypothyroidism, and Diabetes    Subjective   Karen Rubio is a 65 y.o. female is here today for follow-up.  Follow-up for hypothyroidism and Homeland's disease. HTN and Depression. Diabetes.   Adrenal insufficiency / Homeland's disease dx in 2004. She had significant hyperpigmentation and fatigue. She was taking hydrocortisone and fludrocortisone.                                                                                                                      Diabetes mellitus type 2   On Januvia 100  mg daily.     Hypothyroidism postablative. S/p I-131 therapy at age 14. She is taking levothyroxine 50 mcg.       Patient reported increased stress. Her  left her and started using drugs. She has no help with the care and had her friend brought her .She has no resources. Right abdominal skin lesion continues to bleed and increased in size. She said that didn't have transportation to get to appointment.   She reported depression, extreme fatigue, weakness, lack of appetite and anxiety. She also noted loss of memory and difficulty understanding what meds to take. She brought her medications for review.        Medications    Current Outpatient Medications:   •  albuterol sulfate  (90 Base) MCG/ACT inhaler, Inhale 2 puffs Every 4 (Four) Hours As Needed for Wheezing., Disp: 3.7 g, Rfl: 11  •  ALPRAZolam (XANAX) 1 MG tablet, Take 1 tablet by mouth 4 (Four) Times a Day., Disp: 120 tablet, Rfl: 5  •  B-D 3CC LUER-JELLY SYR 25GX1\" 25G X 1\" 3 ML misc, USE AS DIRECTED FOR B12 INJECTIONS, Disp: 1 each, Rfl: 3  •  carboxymethylcellulose (Lubricant Eye Drops) 0.5 % solution, Administer 1 drop to both eyes 3 (Three) Times a Day As Needed for Dry Eyes., Disp: 30 mL, Rfl: 11  •  cetirizine (zyrTEC) 10 MG tablet, TAKE 1 TABLET BY MOUTH EVERY DAY, Disp: 30 tablet, Rfl: 9  •  cyanocobalamin 1000 MCG/ML injection, INJECT 1 ML INTRAMUSCUARLY ONCE EVERY 2 WEEKS AS DIRECTED, " Case Management Discharge Planning Note    Patient name Debbie Reed  Location [facility] 505/636-24 MRN [id number]  : 1970 Date 10/12/2022       Current Admission Date: 10/10/2022  Current Admission Diagnosis:Acute respiratory failure with hypoxia [facility])   Patient Active Problem List    Diagnosis Date Noted   • Pneumonia due to COVID-19 virus 10/10/2022   • Chronic diastolic congestive heart failure ([address] ) 10/10/2022   • Gastroenteritis 10/01/2022   • Vasovagal syncope 10/01/2022   • Elevated MCV 02/10/2021   • Hyperbilirubinemia 02/10/2021   • Acute on chronic diastolic CHF (congestive heart failure) ([facility] ) 02/10/2021   • Elevated procalcitonin 02/10/2021   • Mitral valve insufficiency 02/10/2021   • Abnormal EKG 02/10/2021   • HCAP (healthcare-associated pneumonia) 2020   • Acute on chronic respiratory failure with hypoxia ([facility] ) 2020   • Community acquired pneumonia of right lower lobe of lung 2020   • Respiratory distress 2020   • Pancreatic lesion 2020   • Abnormal CT scan, colon 2020   • Chronic kidney disease, unspecified 01/15/2020   • Coronary artery disease involving native coronary artery of native heart without angina pectoris 2019   • Pre-operative cardiovascular examination 2019   • ESRD (end stage renal disease) on dialysis ([facility] ) 2019   • Hypertensive urgency 2019   • Hx of right BKA ([facility] ) 10/21/2019   • Acute pulmonary edema ([facility] ) 10/20/2019   • Chronic anemia 10/14/2019   • Pulmonary hypertension ([facility] ) 10/13/2019   • Acute blood loss anemia 2019   • Acute respiratory failure with hypoxia ([facility] ) 2019   • S/P AKA (above knee amputation), right ([facility] ) 2019   • Depressed left ventricular ejection fraction 2019   • Hyperphosphatemia 2018   • Hyponatremia 2018   • Gastrointestinal hemorrhage 2018   • Severe sepsis ([address] ) 2018   • Urinary retention 2017   • Chronic osteomyelitis of tibia ([facility] ) 09/23/2017   • Elevated troponin 09/23/2017   • Diarrhea 09/23/2017   • Cellulitis of ankle 09/23/2017   • Non-ST elevation myocardial infarction (NSTEMI), type 2 09/23/2017   • Closed fracture of distal end of right tibia with routine healing 07/03/2017   • Osteomyelitis ([place] [address] ) 12/07/2016   • Poor circulation 12/07/2016   • Sepsis ([place] [address] ) 10/27/2016   • Type 1 diabetes mellitus ([place] [address] ) 10/26/2016   • Renal transplant, status post 10/26/2016   • Hyperkalemia 10/26/2016   • Immunosuppression ([place] [address] ) 11/04/2014   • Hypertension 08/04/2010      LOS (days): 2  Geometric Mean LOS (GMLOS) (days): 5 20  Days to GMLOS:2 9     OBJECTIVE:  Risk of Unplanned Readmission Score: 30 59         Current admission status: Inpatient   Preferred Pharmacy:   [address] [address]  Phone: [phone] Fax: [phone]    Primary Care Provider: Abram Dia DO    Primary Insurance: MEDICARE  Secondary Insurance: CAPITAL    DISCHARGE DETAILS:    Discharge planning discussed with[de-identified] Pt and his wife  Freedom of Choice: Yes     CM contacted family/caregiver?: Yes (Pt's wife contacted me)  Were Treatment Team discharge recommendations reviewed with patient/caregiver?: Yes  Did patient/caregiver verbalize understanding of patient care needs?: Yes  Were patient/caregiver advised of the risks associated with not following Treatment Team discharge recommendations?: Yes         Requested [address]         Is the patient interested in [address] at discharge?: No    DME Referral Provided  Referral made for DME?: No (Pt does not qualify for  )              Treatment Team Recommendation: Home  Discharge Destination Plan[de-identified] Home  Transport at Discharge : Automobile, Family (Wife will transport the patient home)      I notified Nereida Crum That patient is being discharged today and is aware of his chair time there on [place] 10/14/22  Disp: 2 mL, Rfl: 3  •  diclofenac (VOLTAREN) 75 MG EC tablet, , Disp: , Rfl:   •  fluticasone (FLONASE) 50 MCG/ACT nasal spray, 1 spray into the nostril(s) as directed by provider Daily., Disp: 1 bottle, Rfl: 3  •  hydrocortisone (CORTEF) 5 MG tablet, TAKE 4 TABLETS BY MOUTH EVERY DAY IN THE MORNING AND ALSO TAKE 1 TABLET EVERY DAY AT 3 PM (TAKE DOUBLE DOSE IN THE EVENT OF ILLNESS), Disp: 200 tablet, Rfl: 3  •  lactulose (CHRONULAC) 10 GM/15ML solution, , Disp: , Rfl:   •  levothyroxine (SYNTHROID, LEVOTHROID) 50 MCG tablet, Take 1 tablet by mouth Daily., Disp: 90 tablet, Rfl: 3  •  lisinopril (PRINIVIL,ZESTRIL) 10 MG tablet, Take 1 tablet by mouth Daily. For BP, Disp: 90 tablet, Rfl: 3  •  montelukast (SINGULAIR) 10 MG tablet, TAKE 1 TABLET BY MOUTH EVERY DAY, Disp: 30 tablet, Rfl: 11  •  mupirocin (Bactroban) 2 % cream, Apply  topically to the appropriate area as directed 2 (Two) Times a Day. Apply to affected area 3 times daily, Disp: 30 g, Rfl: 0  •  ondansetron ODT (ZOFRAN ODT) 8 MG disintegrating tablet, Take 1 tablet by mouth Every 8 (Eight) Hours As Needed for Nausea or Vomiting., Disp: 15 tablet, Rfl: 2  •  oxyCODONE-acetaminophen (PERCOCET)  MG per tablet, Take 1 tablet by mouth Every 4 (Four) Hours As Needed for Moderate Pain ., Disp: 150 tablet, Rfl: 0  •  simvastatin (ZOCOR) 20 MG tablet, Take 1 tablet by mouth Every Night., Disp: 90 tablet, Rfl: 3  •  SITagliptin (Januvia) 100 MG tablet, Take 1 tablet by mouth Daily., Disp: 90 tablet, Rfl: 3  •  vitamin D (ERGOCALCIFEROL) 1.25 MG (96428 UT) capsule capsule, TAKE 1 CAPSULE BY MOUTH ONE TIME PER WEEK, Disp: 12 capsule, Rfl: 3  •  fludrocortisone 0.1 MG tablet, Take 0.5 tablets by mouth Daily., Disp: 30 tablet, Rfl: 11    Current Facility-Administered Medications:   •  cyanocobalamin injection 1,000 mcg, 1,000 mcg, Intramuscular, Q28 Days, Aleah Morales MD    [facility]  The following portions of the patient's history were reviewed and updated as  "appropriate: allergies, current medications, past family history, past medical history, past social history, past surgical history and problem list.    Review of systems  Review of Systems   Constitutional: Positive for appetite change (improved appetite. ), fatigue and unexpected weight change (weight loss). Negative for activity change, chills and diaphoresis.   HENT: Negative for congestion, ear pain, facial swelling, hearing loss, trouble swallowing and voice change.    Eyes: Positive for visual disturbance. Negative for redness and itching.   Cardiovascular: Negative for palpitations and leg swelling.   Gastrointestinal: Negative for abdominal distention, abdominal pain, constipation, nausea and vomiting.   Endocrine:        As listed in HPI   Genitourinary: Negative.    Musculoskeletal: Positive for arthralgias and back pain (radiating to the left leg. ). Negative for joint swelling, myalgias, neck pain and neck stiffness.   Skin: Negative.    Allergic/Immunologic: Negative.    Neurological: Positive for dizziness, weakness, light-headedness, numbness and headaches. Negative for syncope.   Hematological: Negative.    Psychiatric/Behavioral: Positive for decreased concentration (memory loss) and sleep disturbance. The patient is nervous/anxious.    All other systems reviewed and are negative.      Physical exam  Objective   Blood pressure 140/80, pulse 91, temperature 97.1 °F (36.2 °C), height 132.1 cm (52\"), weight 50 kg (110 lb 4.8 oz), SpO2 99 %, not currently breastfeeding.   Physical Exam   Constitutional: She is oriented to person, place, and time. She appears well-developed.   HENT:   Head: Normocephalic and atraumatic.   Eyes: Conjunctivae are normal.   Neck:   The neck is supple and symmetric   Cardiovascular: Normal rate, regular rhythm and normal heart sounds.   Pulmonary/Chest: Effort normal and breath sounds normal.   Musculoskeletal: Deformity present.   Neurological: She is alert and oriented to " person, place, and time. She has normal reflexes.   Skin: Skin is warm and dry. No rash noted.   Right side of abdomen - 3-4 mm pigmented skin lesion with ulceration and continuous bleeding.    Psychiatric: Thought content normal. Her mood appears anxious. She exhibits a depressed mood.   Vitals reviewed.        LABS AND IMAGING  Office Visit on 01/05/2021   Component Date Value Ref Range Status   • Glucose 01/05/2021 139* 70 - 130 mg/dL Final   • Lot Number 01/05/2021 [id number]   Final   • Expiration Date 01/05/2021 07/01/2021   Final   • Hemoglobin A1C 01/05/2021 6.3  % Final   • Lot Number 01/05/2021 [id number]   Final   • Expiration Date 01/05/2021 09/29/2022   Final           Assessment  Assessment/Plan   1. 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 Postablative hypothyroidism    4. Cobalamin deficiency    5. Vitamin D deficiency    6. Skin lesion      Orders Placed This Encounter   Procedures   • Ambulatory Referral to General Surgery   • POC Glucose, Blood   • POC Glycosylated Hemoglobin (Hb A1C)     Plan    -Hydrocortisone dose: 20 mg in am and 5 mg in pm.    -I have explained the importance of taking steroids, stress doses discussed.   -cont 1/2 tab fludrocortisone    -HTN - lisinopril 10 mg daily. She hasnt been taking propranolol and I have discontinued it.     --continue Januvia 100 mg daily, encouraged to continue taking it as the glucose is higher.          -continue Levothyroxine 50 mcg a day. Thyroid function was normal.     -meds refilled and reviewed. I have simplified the regimen.     -recent lab results reviewed.     - Vitamin D 50,000 units once a month, recommended to restart.     I have sent referral to dermatologist/general surgeon for evaluation of skin lesions. She needs resection of the large abdominal lesion with bleeding ulcer on top of it.     Med list reviewed and simplified.     Follow-up in 4 months   30 min of total 40 min time spent for counseling on her condition, review of medication and indications, simplifying the regimen.

## 2022-10-12 NOTE — RESPIRATORY THERAPY NOTE
Home Oxygen Qualifying Test     Patient name: Tasha Nails        : 1970   Date of Test:  2022  Diagnosis: ESRD, COVID   Home Oxygen Test:    **Medicare Guidelines require item(s) 1-5 on all ambulatory patients or 1 and 2 on non-ambulatory patients  1  Baseline SPO2 on Room Air at rest 95 %   a  If <= 88% on Room Air add O2 via NC to obtain SpO2 >=88%  If LPM needed, document LPM NA needed to reach =>88%    2  SPO2 during exertion on Room Air 92  %  a  During exertion monitor SPO2  If SPO2 increases >=89%, do not add supplemental oxygen    3  SPO2 on Oxygen at Rest  % at  LPM    4  SPO2 during exertion on Oxygen NA % at NA LPM    5  Test performed during exertion activity  []  Supplemental Home Oxygen is indicated  [x]  Client does not qualify for home oxygen  Respiratory Additional Notes- Patient exerted in bed  Patient does not have his prosthesis present for ambulation       Buffy Arango, RRT

## 2022-10-12 NOTE — PLAN OF CARE
Pre-tx:  UF 0 5 L as tolerated per discussion with Nephrology NP Zain  Pt transitioning to MWF schedule for covid clinic post-discharge  2 hr tx today  Will cont to monitor  Post-Dialysis RN Treatment Note    Blood Pressure:  Pre 129/85 mm/Hg  Post 126/65 mmHg   EDW  47 5 kg    Weight:  Pre 50 4 kg   Post 49 8 kg   Mode of weight measurement: Bed Scale **Pt refused standing scale   Volume Removed  600 ml    Treatment duration  2 hrs   NS given  No    Treatment shortened?  No   Medications given during Rx NA   Estimated Kt/V  Not Applicable   Access type: AV fistula   Access Issues: No    Report called to primary nurse   Yes           Problem: METABOLIC, FLUID AND ELECTROLYTES - ADULT  Goal: Electrolytes maintained within normal limits  Description: INTERVENTIONS:  - Monitor labs and assess patient for signs and symptoms of electrolyte imbalances  - Administer electrolyte replacement as ordered  - Monitor response to electrolyte replacements, including repeat lab results as appropriate  - Instruct patient on fluid and nutrition as appropriate  Outcome: Progressing  Goal: Fluid balance maintained  Description: INTERVENTIONS:  - Monitor labs   - Monitor I/O and WT  - Instruct patient on fluid and nutrition as appropriate  - Assess for signs & symptoms of volume excess or deficit  Outcome: Progressing

## 2022-10-13 ENCOUNTER — TRANSITIONAL CARE MANAGEMENT (OUTPATIENT)
Dept: FAMILY MEDICINE CLINIC | Facility: CLINIC | Age: 52
End: 2022-10-13

## 2022-10-13 DIAGNOSIS — J12.82 PNEUMONIA DUE TO COVID-19 VIRUS: Primary | ICD-10-CM

## 2022-10-13 DIAGNOSIS — U07.1 PNEUMONIA DUE TO COVID-19 VIRUS: Primary | ICD-10-CM

## 2022-10-13 RX ORDER — ZINC SULFATE 50(220)MG
220 CAPSULE ORAL
Qty: 14 CAPSULE | Refills: 0 | Status: SHIPPED | OUTPATIENT
Start: 2022-10-13 | End: 2022-11-22

## 2022-10-13 RX ORDER — MELATONIN
5000 DAILY
Qty: 70 TABLET | Refills: 0 | Status: SHIPPED | OUTPATIENT
Start: 2022-10-13 | End: 2023-07-07

## 2022-10-13 RX ORDER — ASCORBIC ACID 500 MG
500 TABLET ORAL DAILY
Qty: 14 TABLET | Refills: 0 | Status: SHIPPED | OUTPATIENT
Start: 2022-10-13 | End: 2022-11-22

## 2022-10-15 LAB
BACTERIA BLD CULT: NORMAL
BACTERIA BLD CULT: NORMAL

## 2022-10-20 ENCOUNTER — DOCUMENTATION (OUTPATIENT)
Dept: NEPHROLOGY | Facility: CLINIC | Age: 52
End: 2022-10-20

## 2022-10-20 DIAGNOSIS — I10 HYPERTENSION, UNSPECIFIED TYPE: ICD-10-CM

## 2022-10-20 RX ORDER — ATORVASTATIN CALCIUM 80 MG/1
80 TABLET, FILM COATED ORAL EVERY MORNING
Qty: 90 TABLET | Refills: 3 | Status: SHIPPED | OUTPATIENT
Start: 2022-10-20

## 2022-10-27 ENCOUNTER — DOCUMENTATION (OUTPATIENT)
Dept: OTHER | Facility: HOSPITAL | Age: 52
End: 2022-10-27

## 2022-10-27 DIAGNOSIS — N18.6 TYPE 1 DIABETES MELLITUS WITH CHRONIC KIDNEY DISEASE ON CHRONIC DIALYSIS (HCC): ICD-10-CM

## 2022-10-27 DIAGNOSIS — E10.22 TYPE 1 DIABETES MELLITUS WITH CHRONIC KIDNEY DISEASE ON CHRONIC DIALYSIS (HCC): ICD-10-CM

## 2022-10-27 DIAGNOSIS — Z99.2 TYPE 1 DIABETES MELLITUS WITH CHRONIC KIDNEY DISEASE ON CHRONIC DIALYSIS (HCC): ICD-10-CM

## 2022-10-27 DIAGNOSIS — N17.9 ACUTE KIDNEY INJURY (HCC): Primary | ICD-10-CM

## 2022-10-27 RX ORDER — PREDNISONE 1 MG/1
5 TABLET ORAL DAILY
Qty: 90 TABLET | Refills: 3 | Status: SHIPPED | OUTPATIENT
Start: 2022-10-27

## 2022-11-22 DIAGNOSIS — K92.0 GASTROINTESTINAL HEMORRHAGE WITH HEMATEMESIS: ICD-10-CM

## 2022-11-22 DIAGNOSIS — Z99.2 ESRD (END STAGE RENAL DISEASE) ON DIALYSIS (HCC): Primary | ICD-10-CM

## 2022-11-22 DIAGNOSIS — N18.6 ESRD (END STAGE RENAL DISEASE) ON DIALYSIS (HCC): Primary | ICD-10-CM

## 2022-11-22 RX ORDER — PANTOPRAZOLE SODIUM 40 MG/1
40 TABLET, DELAYED RELEASE ORAL DAILY
Qty: 90 TABLET | Refills: 3 | Status: SHIPPED | OUTPATIENT
Start: 2022-11-22

## 2022-11-30 PROBLEM — K52.9 GASTROENTERITIS: Status: RESOLVED | Noted: 2022-10-01 | Resolved: 2022-11-30

## 2022-12-06 DIAGNOSIS — I12.0 BENIGN HYPERTENSION WITH CKD (CHRONIC KIDNEY DISEASE) STAGE V (HCC): ICD-10-CM

## 2022-12-06 DIAGNOSIS — N18.5 BENIGN HYPERTENSION WITH CKD (CHRONIC KIDNEY DISEASE) STAGE V (HCC): ICD-10-CM

## 2022-12-06 RX ORDER — NIFEDIPINE 30 MG/1
30 TABLET, EXTENDED RELEASE ORAL DAILY
Qty: 90 TABLET | Refills: 3 | Status: SHIPPED | OUTPATIENT
Start: 2022-12-06 | End: 2023-02-09 | Stop reason: SDUPTHER

## 2022-12-09 PROBLEM — J12.82 PNEUMONIA DUE TO COVID-19 VIRUS: Status: RESOLVED | Noted: 2022-10-10 | Resolved: 2022-12-09

## 2022-12-09 PROBLEM — U07.1 PNEUMONIA DUE TO COVID-19 VIRUS: Status: RESOLVED | Noted: 2022-10-10 | Resolved: 2022-12-09

## 2022-12-18 DIAGNOSIS — Z94.0 RENAL TRANSPLANT, STATUS POST: ICD-10-CM

## 2022-12-20 RX ORDER — TACROLIMUS 1 MG/1
CAPSULE ORAL
Qty: 270 CAPSULE | Refills: 0 | Status: SHIPPED | OUTPATIENT
Start: 2022-12-20

## 2022-12-22 ENCOUNTER — DOCUMENTATION (OUTPATIENT)
Dept: OTHER | Facility: HOSPITAL | Age: 52
End: 2022-12-22

## 2022-12-22 DIAGNOSIS — I12.0 BENIGN HYPERTENSION WITH CKD (CHRONIC KIDNEY DISEASE) STAGE V (HCC): Primary | ICD-10-CM

## 2022-12-22 DIAGNOSIS — N18.5 BENIGN HYPERTENSION WITH CKD (CHRONIC KIDNEY DISEASE) STAGE V (HCC): Primary | ICD-10-CM

## 2022-12-22 RX ORDER — METOPROLOL SUCCINATE 25 MG/1
25 TABLET, EXTENDED RELEASE ORAL DAILY
Qty: 90 TABLET | Refills: 3 | Status: SHIPPED | OUTPATIENT
Start: 2022-12-22

## 2023-02-09 DIAGNOSIS — I12.0 BENIGN HYPERTENSION WITH CKD (CHRONIC KIDNEY DISEASE) STAGE V (HCC): ICD-10-CM

## 2023-02-09 DIAGNOSIS — N18.5 BENIGN HYPERTENSION WITH CKD (CHRONIC KIDNEY DISEASE) STAGE V (HCC): ICD-10-CM

## 2023-02-09 RX ORDER — NIFEDIPINE 30 MG/1
30 TABLET, EXTENDED RELEASE ORAL DAILY
Qty: 90 TABLET | Refills: 2 | Status: SHIPPED | OUTPATIENT
Start: 2023-02-09 | End: 2023-05-18

## 2023-02-17 DIAGNOSIS — E10.22 TYPE 1 DIABETES MELLITUS WITH END-STAGE RENAL DISEASE (ESRD) (HCC): ICD-10-CM

## 2023-02-17 DIAGNOSIS — N18.6 TYPE 1 DIABETES MELLITUS WITH END-STAGE RENAL DISEASE (ESRD) (HCC): ICD-10-CM

## 2023-02-17 RX ORDER — INSULIN GLARGINE 100 [IU]/ML
10 INJECTION, SOLUTION SUBCUTANEOUS EVERY 12 HOURS SCHEDULED
Qty: 20 ML | Refills: 3 | Status: SHIPPED | OUTPATIENT
Start: 2023-02-17

## 2023-03-13 ENCOUNTER — TELEPHONE (OUTPATIENT)
Dept: NEUROLOGY | Facility: CLINIC | Age: 53
End: 2023-03-13

## 2023-03-13 NOTE — TELEPHONE ENCOUNTER
Spoke with patient he states he does not need to see Dr Rosalba Yip now  He will call the office if he needs to reschedule

## 2023-03-14 DIAGNOSIS — E83.39 HYPERPHOSPHATEMIA: Primary | ICD-10-CM

## 2023-03-14 RX ORDER — SEVELAMER CARBONATE 800 MG/1
800 TABLET, FILM COATED ORAL 4 TIMES DAILY
Qty: 360 TABLET | Refills: 3 | Status: SHIPPED | OUTPATIENT
Start: 2023-03-14 | End: 2023-07-31 | Stop reason: SDUPTHER

## 2023-03-23 NOTE — CASE MANAGEMENT
Phill Skiff notified of patient being discharged today  Propranolol Pregnancy And Lactation Text: This medication is Pregnancy Category C and it isn't known if it is safe during pregnancy. It is excreted in breast milk.

## 2023-04-26 DIAGNOSIS — J01.01 ACUTE RECURRENT MAXILLARY SINUSITIS: Primary | ICD-10-CM

## 2023-04-26 RX ORDER — AMOXICILLIN 500 MG/1
500 TABLET, FILM COATED ORAL 2 TIMES DAILY
Qty: 10 TABLET | Refills: 0 | Status: SHIPPED | OUTPATIENT
Start: 2023-04-26 | End: 2023-05-03

## 2023-04-27 ENCOUNTER — APPOINTMENT (EMERGENCY)
Dept: RADIOLOGY | Facility: HOSPITAL | Age: 53
End: 2023-04-27

## 2023-04-27 ENCOUNTER — HOSPITAL ENCOUNTER (EMERGENCY)
Facility: HOSPITAL | Age: 53
Discharge: HOME | End: 2023-04-27
Attending: EMERGENCY MEDICINE

## 2023-04-27 VITALS
OXYGEN SATURATION: 92 % | TEMPERATURE: 97.9 F | SYSTOLIC BLOOD PRESSURE: 176 MMHG | RESPIRATION RATE: 18 BRPM | HEART RATE: 86 BPM | DIASTOLIC BLOOD PRESSURE: 79 MMHG

## 2023-04-27 DIAGNOSIS — R79.89 ELEVATED PROCALCITONIN: ICD-10-CM

## 2023-04-27 DIAGNOSIS — R05.9 COUGH: Primary | ICD-10-CM

## 2023-04-27 LAB
ALBUMIN SERPL BCP-MCNC: 4 G/DL (ref 3.5–5)
ALP SERPL-CCNC: 113 U/L (ref 34–104)
ALT SERPL W P-5'-P-CCNC: 17 U/L (ref 7–52)
ANION GAP SERPL CALCULATED.3IONS-SCNC: 12 MMOL/L (ref 4–13)
APTT PPP: 33 SECONDS (ref 23–37)
AST SERPL W P-5'-P-CCNC: 17 U/L (ref 13–39)
BASOPHILS # BLD AUTO: 0.02 THOUSANDS/ΜL (ref 0–0.1)
BASOPHILS NFR BLD AUTO: 0 % (ref 0–1)
BILIRUB SERPL-MCNC: 0.7 MG/DL (ref 0.2–1)
BNP SERPL-MCNC: 400 PG/ML (ref 0–100)
BUN SERPL-MCNC: 19 MG/DL (ref 5–25)
CALCIUM SERPL-MCNC: 9.3 MG/DL (ref 8.4–10.2)
CHLORIDE SERPL-SCNC: 97 MMOL/L (ref 96–108)
CO2 SERPL-SCNC: 26 MMOL/L (ref 21–32)
CREAT SERPL-MCNC: 3.68 MG/DL (ref 0.6–1.3)
EOSINOPHIL # BLD AUTO: 0.1 THOUSAND/ΜL (ref 0–0.61)
EOSINOPHIL NFR BLD AUTO: 1 % (ref 0–6)
ERYTHROCYTE [DISTWIDTH] IN BLOOD BY AUTOMATED COUNT: 14.6 % (ref 11.6–15.1)
GFR SERPL CREATININE-BSD FRML MDRD: 17 ML/MIN/1.73SQ M
GLUCOSE SERPL-MCNC: 91 MG/DL (ref 65–140)
HCT VFR BLD AUTO: 36.6 % (ref 36.5–49.3)
HGB BLD-MCNC: 12.2 G/DL (ref 12–17)
IMM GRANULOCYTES # BLD AUTO: 0.03 THOUSAND/UL (ref 0–0.2)
IMM GRANULOCYTES NFR BLD AUTO: 0 % (ref 0–2)
INR PPP: 0.87 (ref 0.84–1.19)
LACTATE SERPL-SCNC: 1.1 MMOL/L (ref 0.5–2)
LYMPHOCYTES # BLD AUTO: 1.57 THOUSANDS/ΜL (ref 0.6–4.47)
LYMPHOCYTES NFR BLD AUTO: 21 % (ref 14–44)
MCH RBC QN AUTO: 34.8 PG (ref 26.8–34.3)
MCHC RBC AUTO-ENTMCNC: 33.3 G/DL (ref 31.4–37.4)
MCV RBC AUTO: 104 FL (ref 82–98)
MONOCYTES # BLD AUTO: 0.74 THOUSAND/ΜL (ref 0.17–1.22)
MONOCYTES NFR BLD AUTO: 10 % (ref 4–12)
NEUTROPHILS # BLD AUTO: 5.07 THOUSANDS/ΜL (ref 1.85–7.62)
NEUTS SEG NFR BLD AUTO: 68 % (ref 43–75)
NRBC BLD AUTO-RTO: 0 /100 WBCS
PLATELET # BLD AUTO: 176 THOUSANDS/UL (ref 149–390)
PMV BLD AUTO: 9.1 FL (ref 8.9–12.7)
POTASSIUM SERPL-SCNC: 3.4 MMOL/L (ref 3.5–5.3)
PROCALCITONIN SERPL-MCNC: 0.51 NG/ML
PROT SERPL-MCNC: 7.2 G/DL (ref 6.4–8.4)
PROTHROMBIN TIME: 12 SECONDS (ref 11.6–14.5)
RBC # BLD AUTO: 3.51 MILLION/UL (ref 3.88–5.62)
SODIUM SERPL-SCNC: 135 MMOL/L (ref 135–147)
WBC # BLD AUTO: 7.53 THOUSAND/UL (ref 4.31–10.16)

## 2023-04-27 RX ORDER — LEVOFLOXACIN 250 MG/1
250 TABLET ORAL ONCE
Status: DISCONTINUED | OUTPATIENT
Start: 2023-04-27 | End: 2023-04-27

## 2023-04-27 RX ORDER — DOXYCYCLINE HYCLATE 100 MG/1
100 CAPSULE ORAL 2 TIMES DAILY
Qty: 14 CAPSULE | Refills: 0 | Status: SHIPPED | OUTPATIENT
Start: 2023-04-27 | End: 2023-05-04

## 2023-04-27 RX ORDER — DOXYCYCLINE HYCLATE 100 MG/1
100 CAPSULE ORAL ONCE
Status: COMPLETED | OUTPATIENT
Start: 2023-04-27 | End: 2023-04-27

## 2023-04-27 RX ADMIN — DOXYCYCLINE HYCLATE 100 MG: 100 CAPSULE ORAL at 12:50

## 2023-04-27 NOTE — ED PROVIDER NOTES
Final Diagnosis:  1  Cough    2  Elevated procalcitonin        Chief Complaint   Patient presents with   • Cough     Patient states he started with a cough on Monday  Patient complains of SOB only while ambulating  Denies any pain anywhere  Patient last had dialysis today  Patient went to his PCP yesterday and placed on amoxicillin for his cough     HPI  Patient presents for evaluation of cough  Patient tells me that he developed a cough and when he was seen at dialysis he was told to come in for further evaluation  He states that he will be occasionally productive of a clear sputum  Denies any fever chills, sick contacts  No nausea vomiting  Patient states that he did go to his dialysis session today and completed it and then came here afterwards  Patient does have a history of end-stage renal disease  Status post transplant which failed  Currently receiving dialysis on Tuesday Thursday Saturday  Per record review the patient was being reviewed for transplant but is only a candidate for live donor at this time  Apparently the patient does have a willing live donor but testing has not been performed to see if this would be possible yet  Patient was seen here and hospitalized in the fall secondary to COVID infection  At that time he was requiring oxygen to maintain his saturations  Patient tells me he does not have any known ongoing lung or cardiac problems  Unless otherwise specified:  - No language barrier    - History obtained from patient  - There are no limitations to the history obtained    - Previous charting was reviewed    PMH:   has a past medical history of Bacteremia (12/21/2018), CAD (coronary artery disease), Cardiac arrest ([facility] ), Chronic kidney disease, Diabetes mellitus type 1 ([facility] ), Dialysis patient ([facility] ), GI bleed, Hyperlipidemia, Hypertension, Infection at site of external fixator pin [facility]), MI (myocardial infarction) ([facility] ), Pneumonia, Renal failure, and Renal transplant, status post (07/21/2007)  PSH:   has a past surgical history that includes Nephrectomy transplanted organ; GLUTAMIC ACID DECARBOXYLASE (HISTORICAL); Eye surgery; Toe amputation (Right, 10/27/2016); pr open treatment fracture distal tibia fibula (Right, 7/3/2017); CLOSED REDUCTION ANKLE (Right, 7/3/2017); Ankle fracture surgery; Ankle hardware removal (Right, 7/31/2017); Ankle hardware removal (Right, 8/17/2017); Fracture surgery; IR PICC line (8/20/2018); IR venous line removal (10/5/2018); Esophagogastroduodenoscopy (N/A, 12/20/2018); Leg amputation (Right, 02/01/2019); IR tunneled dialysis catheter placement (10/21/2019); Coronary angioplasty with stent (02/2018); IR tunneled dialysis catheter check/change/reposition/angioplasty (1/8/2020); Amputation (Right, 02/2019); pr arteriovenous anastomosis open direct (Right, 2/11/2020); IR AV fistulagram/graftogram (4/16/2020); and IR tunneled dialysis catheter removal (5/29/2020)  ROS:  Review of Systems   - 13 point ROS was performed and all are normal unless stated in the history above  - Nursing note reviewed  Vitals reviewed  - Orders placed by myself and/or advanced practitioner / resident  PE:   Vitals:    04/27/23 1020 04/27/23 1200   BP: (!) 195/84 (!) 171/79   BP Location:  Left arm   Pulse: 94 84   Resp: 17 18   Temp: 97 9 °F (36 6 °C)    TempSrc: Temporal    SpO2: 91% 97%     Vitals reviewed by me  Patient in good spirits, appears chronically unwell  He has some baseline facial swelling that he says is normal   He tells me his right eye is fake  Patient is saturating in low 90s on room air approximately 9091%  Good Plath  Fistula in right upper extremity with palpable thrill  Patient has above-knee amputation  Unless otherwise specified above:    General: VS reviewed  Appears in NAD    Head: Normocephalic, atraumatic  Eyes: EOM-I      ENT: Atraumatic external nose and ears  No drooling  Neck: No JVD      CV: No pallor noted  Lungs:   No tachypnea  No respiratory distress    Abdomen:  Soft, non-tender, non-distended    MSK:   No obvious deformity    Skin: Dry, intact  No obvious rash  Psychiatric/Behavioral: Appropriate mood and affect   Exam: deferred    Physical Exam     Procedures   A:  - Nursing note reviewed                        XR chest portable   ED Interpretation   No is consolidation to indicate pneumonia interpretation        Orders Placed This Encounter   Procedures   • Blood culture #1   • Blood culture #2   • XR chest portable   • CBC and differential   • Comprehensive metabolic panel   • Lactic acid   • Procalcitonin   • Protime-INR   • APTT   • B-Type Natriuretic Peptide(BNP)   • Insert peripheral IV     Labs Reviewed   CBC AND DIFFERENTIAL - Abnormal       Result Value Ref Range Status    WBC 7 53  4 31 - 10 16 Thousand/uL Final    RBC 3 51 (*) 3 88 - 5 62 Million/uL Final    Hemoglobin 12 2  12 0 - 17 0 g/dL Final    Hematocrit 36 6  36 5 - 49 3 % Final     (*) 82 - 98 fL Final    MCH 34 8 (*) 26 8 - 34 3 pg Final    MCHC 33 3  31 4 - 37 4 g/dL Final    RDW 14 6  11 6 - 15 1 % Final    MPV 9 1  8 9 - 12 7 fL Final    Platelets 969  490 - 390 Thousands/uL Final    nRBC 0  /100 WBCs Final    Neutrophils Relative 68  43 - 75 % Final    Immat GRANS % 0  0 - 2 % Final    Lymphocytes Relative 21  14 - 44 % Final    Monocytes Relative 10  4 - 12 % Final    Eosinophils Relative 1  0 - 6 % Final    Basophils Relative 0  0 - 1 % Final    Neutrophils Absolute 5 07  1 85 - 7 62 Thousands/µL Final    Immature Grans Absolute 0 03  0 00 - 0 20 Thousand/uL Final    Lymphocytes Absolute 1 57  0 60 - 4 47 Thousands/µL Final    Monocytes Absolute 0 74  0 17 - 1 22 Thousand/µL Final    Eosinophils Absolute 0 10  0 00 - 0 61 Thousand/µL Final    Basophils Absolute 0 02  0 00 - 0 10 Thousands/µL Final   COMPREHENSIVE METABOLIC PANEL - Abnormal    Sodium 135  135 - 147 mmol/L Final    Potassium 3 4 (*) 3 5 - 5 3 mmol/L Final    Chloride 97  96 - 108 mmol/L Final    CO2 26  21 - 32 mmol/L Final    ANION GAP 12  4 - 13 mmol/L Final    BUN 19  5 - 25 mg/dL Final    Creatinine 3 68 (*) 0 60 - 1 30 mg/dL Final    Comment: Standardized to IDMS reference method    Glucose 91  65 - 140 mg/dL Final    Comment: If the patient is fasting, the ADA then defines impaired fasting glucose as > 100 mg/dL and diabetes as > or equal to 123 mg/dL  Calcium 9 3  8 4 - 10 2 mg/dL Final    AST 17  13 - 39 U/L Final    ALT 17  7 - 52 U/L Final    Comment: Specimen collection should occur prior to Sulfasalazine administration due to the potential for falsely depressed results  Alkaline Phosphatase 113 (*) 34 - 104 U/L Final    Total Protein 7 2  6 4 - 8 4 g/dL Final    Albumin 4 0  3 5 - 5 0 g/dL Final    Total Bilirubin 0 70  0 20 - 1 00 mg/dL Final    Comment: Use of this assay is not recommended for patients undergoing treatment with eltrombopag due to the potential for falsely elevated results  N-acetyl-p-benzoquinone imine (metabolite of Acetaminophen) will generate erroneously low results in samples for patients that have taken an overdose of Acetaminophen      eGFR 17  ml/min/1 73sq m Final    Narrative:     [place] guidelines for Chronic Kidney Disease (CKD):   •  Stage 1 with normal or high GFR (GFR > 90 mL/min/1 73 square meters)  •  Stage 2 Mild CKD (GFR = 60-89 mL/min/1 73 square meters)  •  Stage 3A Moderate CKD (GFR = 45-59 mL/min/1 73 square meters)  •  Stage 3B Moderate CKD (GFR = 30-44 mL/min/1 73 square meters)  •  Stage 4 Severe CKD (GFR = 15-29 mL/min/1 73 square meters)  •  Stage 5 End Stage CKD (GFR <15 mL/min/1 73 square meters)  Note: GFR calculation is accurate only with a steady state creatinine   PROCALCITONIN TEST - Abnormal    Procalcitonin 0 51 (*) <=0 25 ng/ml Final    Comment: Suspected Lower Respiratory Tract Infection (LRTI):  - LESS than or EQUAL to 0 25 ng/mL:   low likelihood for bacterial LRTI; antibiotics DISCOURAGED   - GREATER than 0 25 ng/mL:   increased likelihood for bacterial LRTI; antibiotics ENCOURAGED  Suspected Sepsis:  - Strongly consider initiating antibiotics in ALL UNSTABLE patients  - LESS than or EQUAL to 0 5 ng/mL:   low likelihood for bacterial sepsis; antibiotics DISCOURAGED   - GREATER than 0 5 ng/mL:   increased likelihood for bacterial sepsis; antibiotics ENCOURAGED   - GREATER than 2 ng/mL:   high risk for severe sepsis / septic shock; antibiotics strongly ENCOURAGED  Decisions on antibiotic use should not be based solely on Procalcitonin (PCT) levels  If PCT is low but uncertainty exists with stopping antibiotics, repeat PCT in 6-24 hours to confirm the low level  If antibiotics are administered (regardless if initial PCT was high or low), repeat PCT every 1-2 days to consider early antibiotic cessation (when GREATER than 80% decrease from the peak OR when PCT drops below designated cutoffs, whichever comes first), so long as the infection is NOT one that typically requires prolonged treatment durations (e g , bone/joint infections, endocarditis, Staph  aureus bacteremia)      Situations of FALSE-POSITIVE Procalcitonin values:  1) Newborns < 67 hours old  2) Massive stress from severe trauma / burns, major surgery, acute pancreatitis, cardiogenic / hemorrhagic shock, sickle cell crisis, or other organ perfusion abnormalities  3) Malaria and some Candidal infections  4) Treatment with agents that stimulate cytokines (e g , OKT3, anti-lymphocyte globulins, alemtuzumab, IL-2, granulocyte transfusion [NOT GCSFs])  5) Chronic renal disease causes elevated baseline levels (consider GREATER than 0 75 ng/mL as an abnormal cut-off); initiating HD/CRRT may cause transient decreases  6) Paraneoplastic syndromes from medullary thyroid or SCLC, some forms of vasculitis, and acute kqijq-bo-rrpn disease    Situations of FALSE-NEGATIVE Procalcitonin values:  1) Too early in clinical course for PCT to have reached its peak (may repeat in 6-24 hours to confirm low level)  2) Localized infection WITHOUT systemic (SIRS / sepsis) response (e g , an abscess, osteomyelitis, cystitis)  3) Mycobacteria (e g , Tuberculosis, MAC)  4) Cystic fibrosis exacerbations     B-TYPE NATRIURETIC PEPTIDE (BNP) - Abnormal     (*) 0 - 100 pg/mL Final   LACTIC ACID, PLASMA (W/REFLEX IF RESULT > 2 0) - Normal    LACTIC ACID 1 1  0 5 - 2 0 mmol/L Final    Narrative:     Result may be elevated if tourniquet was used during collection  PROTIME-INR - Normal    Protime 12 0  11 6 - 14 5 seconds Final    INR 0 87  0 84 - 1 19 Final   APTT - Normal    PTT 33  23 - 37 seconds Final    Comment: Therapeutic Heparin Range =  60-90 seconds   BLOOD CULTURE   BLOOD CULTURE         Final Diagnosis:  1  Cough    2  Elevated procalcitonin        P:  -Patient presents for evaluation of cough  Overall the patient appears well but has a very significant medical history and was on tacrolimus at some point, unclear if he is still on this for his failed transplant  Will evaluate for signs of sepsis, infection including chest x-ray, Pro-Ramirez, CBC, lactic and blood cultures  BMP is also a possibility though this picture will be clouded by patient's end-stage renal disease   -I reviewed all the patient's results with him  No significant white count  Patient has chronically elevated BNP as well as procalcitonin though his procalcitonin is lower than in the past   BNP test changed so is not equivalent though prior values were in the tens of thousands  No clear effusions on chest x-ray  Patient was satting in the 90s as he ambulated but would occasionally drop down to 89 with a good Plath while sitting in bed  I discussed with him admission to the hospital for further evaluation  Patient tells me that he went to dialysis today and he is below his dry weight  He adamantly denies any shortness of breath    He does not want "to be admitted to the hospital   Overall I think this is a reasonable choice to be made at this time  Due to an abundance of caution we will broaden his antibiotic coverage and add doxycycline though I think pneumonia is unlikely to be a root cause of his possible hypoxemia  Possible secondary to prior COVID infection in the fall  Return precautions were discussed at length  Unless otherwise noted the patient's medications were reviewed and they should continue as directed  Medications   doxycycline hyclate (VIBRAMYCIN) capsule 100 mg (has no administration in time range)     Time reflects when diagnosis was documented in both MDM as applicable and the Disposition within this note     Time User Action Codes Description Comment    4/27/2023 12:27 PM Sonia Rolling Add [R05 9] Cough     4/27/2023 12:28 PM Sonia Rolling Add [R79 89] Elevated procalcitonin       ED Disposition     ED Disposition   Discharge    Condition   Stable    Date/Time   Thu Apr 27, 2023 12:27 PM    Comment   Roselia Pavon discharge to home/self care  Follow-up Information     Follow up With Specialties Details Why Contact Info    Mars Magdaleno DO Family Medicine    [address] [facility]  [address]  [phone]          Patient's Medications   Discharge Prescriptions    DOXYCYCLINE HYCLATE (VIBRAMYCIN) 100 MG CAPSULE    Take 1 capsule (100 mg total) by mouth 2 (two) times a day for 7 days       Start Date: 4/27/2023 End Date: 5/4/2023       Order Dose: 100 mg       Quantity: 14 capsule    Refills: 0     No discharge procedures on file  Prior to Admission Medications   Prescriptions Last Dose Informant Patient Reported? Taking?    Glucagon, rDNA, (Glucagon Emergency) 1 MG KIT   No No   Sig: INJECT 1MG SUBCUTANEOUSLY ONCE AS NEEDED FOR LOW BLOOD SUGAR FOR UP TO 1 DOSE   Insulin Syringe-Needle U-100 31G X 15/64\" 0 5 ML MISC   No No   Sig: Use 3 (three) times a day   NIFEdipine (PROCARDIA XL) 30 mg 24 hr tablet " No No   Sig: Take 1 tablet (30 mg total) by mouth daily   amoxicillin (AMOXIL) 500 MG tablet   No No   Sig: Take 1 tablet (500 mg total) by mouth 2 (two) times a day for 7 days   aspirin 81 mg chewable tablet   Yes No   Sig: Chew 81 mg every morning    atorvastatin (LIPITOR) 80 mg tablet   No No   Sig: Take 1 tablet (80 mg total) by mouth every morning   azaTHIOprine (IMURAN) 50 mg tablet   No No   Sig: Take 1 tablet (50 mg total) by mouth daily   b complex-vitamin C-folic acid (NEPHROCAPS) 1 mg capsule   No No   Sig: Take 1 capsule by mouth daily   carvedilol (COREG) 6 25 mg tablet   No No   Sig: Take 1 tablet (6 25 mg total) by mouth 2 (two) times a day with meals   cholecalciferol (VITAMIN D3) 1,000 units tablet   No No   Sig: Take 5 tablets (5,000 Units total) by mouth daily for 14 days   cinacalcet (SENSIPAR) 30 mg tablet   No No   Sig: Take 1 tablet (30 mg total) by mouth daily   glucose blood (Contour Next Test) test strip   No No   Sig: Use as instructed 6 times daily   insulin aspart (NovoLOG) 100 units/mL injection   No No   Sig: Inject 10 Units under the skin 3 (three) times a day with meals   Patient taking differently: Inject 1-10 Units under the skin 3 (three) times a day with meals Pt uses Sliding scale at home   insulin glargine (Semglee) 100 units/mL subcutaneous injection   No No   Sig: Inject 10 Units under the skin every 12 (twelve) hours   isosorbide mononitrate (IMDUR) 30 mg 24 hr tablet   No No   Sig: Take 1 tablet (30 mg total) by mouth daily   metoprolol succinate (TOPROL-XL) 25 mg 24 hr tablet   No No   Sig: Take 1 tablet (25 mg total) by mouth daily   midodrine (PROAMATINE) 5 mg tablet   Yes No   Sig: Take 5 mg by mouth 3 (three) times a day as needed   pantoprazole (PROTONIX) 40 mg tablet   No No   Sig: Take 1 tablet (40 mg total) by mouth daily   predniSONE 5 mg tablet   No No   Sig: Take 1 tablet (5 mg total) by mouth daily   sevelamer carbonate (RENVELA) 800 mg tablet   No No   Sig: "Take 1 tablet (800 mg total) by mouth 4 (four) times a day   tacrolimus (PROGRAF) 1 mg capsule   No No   Sig: TAKE 2 CAPSULES BY MOUTH EVERY MORNING AND ONE CAPSULE EVERY DAY AT BEDTIME      Facility-Administered Medications: None       Portions of the record may have been created with voice recognition software  Occasional wrong word or \"sound a like\" substitutions may have occurred due to the inherent limitations of voice recognition software  Read the chart carefully and recognize, using context, where substitutions have occurred      Electronically signed by:  MD Maru Rodney MD  04/27/23 0711    "

## 2023-04-27 NOTE — ED NOTES
Patient ambulated by this RN  Patients SaO2 was 90% on RA with ambulation  Patient denies any SOB  ED provider made aware        Joan White RN  04/27/23 4612

## 2023-04-28 ENCOUNTER — VBI (OUTPATIENT)
Dept: FAMILY MEDICINE CLINIC | Facility: CLINIC | Age: 53
End: 2023-04-28

## 2023-04-28 NOTE — PROGRESS NOTES
ED Visit Information     Ed visit date: 4/27/23  Diagnosis Description: Cough  In Network? Yes Migel Navas  Discharge status: Home  Discharged with meds ? Yes  Number of ED visits to date: 1  ED Severity:3     Outreach Information    Outreach successful: Yes 1  Date letter mailed:0  Date Finalized: 4/28/23    Care Coordination    Follow up appointment with pcp: no declined  Transportation issues ? No     4/28/23-  There was a personal communication with patient regarding recent ED visit    Patient declined a follow up with PCP   Patient is aware of their nearest [place] [address] urgent care facility, education not given [place]

## 2023-05-02 DIAGNOSIS — I25.10 CORONARY ARTERY DISEASE INVOLVING NATIVE CORONARY ARTERY OF NATIVE HEART WITHOUT ANGINA PECTORIS: ICD-10-CM

## 2023-05-02 LAB
BACTERIA BLD CULT: NORMAL
BACTERIA BLD CULT: NORMAL

## 2023-05-02 RX ORDER — CARVEDILOL 6.25 MG/1
6.25 TABLET ORAL 2 TIMES DAILY WITH MEALS
Qty: 180 TABLET | Refills: 3 | Status: SHIPPED | OUTPATIENT
Start: 2023-05-02 | End: 2023-05-03 | Stop reason: SDUPTHER

## 2023-05-03 DIAGNOSIS — I25.10 CORONARY ARTERY DISEASE INVOLVING NATIVE CORONARY ARTERY OF NATIVE HEART WITHOUT ANGINA PECTORIS: ICD-10-CM

## 2023-05-03 RX ORDER — CARVEDILOL 6.25 MG/1
6.25 TABLET ORAL 2 TIMES DAILY WITH MEALS
Qty: 180 TABLET | Refills: 3 | Status: SHIPPED | OUTPATIENT
Start: 2023-05-03 | End: 2023-09-22 | Stop reason: SDUPTHER

## 2023-05-07 DIAGNOSIS — Z94.0 RENAL TRANSPLANT, STATUS POST: ICD-10-CM

## 2023-05-07 RX ORDER — TACROLIMUS 1 MG/1
CAPSULE ORAL
Qty: 270 CAPSULE | Refills: 0 | Status: SHIPPED | OUTPATIENT
Start: 2023-05-07

## 2023-05-17 ENCOUNTER — APPOINTMENT (OUTPATIENT)
Dept: LAB | Facility: HOSPITAL | Age: 53
End: 2023-05-17

## 2023-05-17 DIAGNOSIS — Z00.8 HEALTH EXAMINATION IN POPULATION SURVEY: ICD-10-CM

## 2023-05-17 LAB
CHOLEST SERPL-MCNC: 73 MG/DL
EST. AVERAGE GLUCOSE BLD GHB EST-MCNC: 163 MG/DL
HBA1C MFR BLD: 7.3 %
HDLC SERPL-MCNC: 43 MG/DL
LDLC SERPL CALC-MCNC: 20 MG/DL (ref 0–100)
NONHDLC SERPL-MCNC: 30 MG/DL
TRIGL SERPL-MCNC: 51 MG/DL

## 2023-05-18 ENCOUNTER — DOCUMENTATION (OUTPATIENT)
Dept: OTHER | Facility: HOSPITAL | Age: 53
End: 2023-05-18

## 2023-05-18 DIAGNOSIS — I12.0 BENIGN HYPERTENSION WITH CKD (CHRONIC KIDNEY DISEASE) STAGE V (HCC): Primary | ICD-10-CM

## 2023-05-18 DIAGNOSIS — N18.5 BENIGN HYPERTENSION WITH CKD (CHRONIC KIDNEY DISEASE) STAGE V (HCC): Primary | ICD-10-CM

## 2023-05-18 RX ORDER — NIFEDIPINE 60 MG/1
60 TABLET, FILM COATED, EXTENDED RELEASE ORAL DAILY
Qty: 90 TABLET | Refills: 3 | Status: SHIPPED | OUTPATIENT
Start: 2023-05-18

## 2023-05-22 ENCOUNTER — PREP FOR PROCEDURE (OUTPATIENT)
Dept: INTERVENTIONAL RADIOLOGY/VASCULAR | Facility: CLINIC | Age: 53
End: 2023-05-22

## 2023-05-22 DIAGNOSIS — Z99.2 ESRD (END STAGE RENAL DISEASE) ON DIALYSIS (HCC): Primary | ICD-10-CM

## 2023-05-22 DIAGNOSIS — N18.6 ESRD (END STAGE RENAL DISEASE) ON DIALYSIS (HCC): Primary | ICD-10-CM

## 2023-06-01 ENCOUNTER — TELEPHONE (OUTPATIENT)
Dept: INTERVENTIONAL RADIOLOGY/VASCULAR | Facility: HOSPITAL | Age: 53
End: 2023-06-01

## 2023-06-01 NOTE — PROGRESS NOTES
Spoke with Ed Charles, confirmed appointment at [address] [place] [address] on 6/9, arrival time of 0730 given  Pt instructed to have nothing to eat or drink after midnight and to have a ride to and from  All questions answered  Consult complete

## 2023-06-09 LAB
DME PARACHUTE DELIVERY DATE REQUESTED: NORMAL
DME PARACHUTE ITEM DESCRIPTION: NORMAL
DME PARACHUTE ORDER STATUS: NORMAL
DME PARACHUTE SUPPLIER NAME: NORMAL
DME PARACHUTE SUPPLIER PHONE: NORMAL

## 2023-06-23 ENCOUNTER — HOSPITAL ENCOUNTER (OUTPATIENT)
Dept: INTERVENTIONAL RADIOLOGY/VASCULAR | Facility: HOSPITAL | Age: 53
End: 2023-06-23
Attending: STUDENT IN AN ORGANIZED HEALTH CARE EDUCATION/TRAINING PROGRAM
Payer: MEDICARE

## 2023-06-23 VITALS
SYSTOLIC BLOOD PRESSURE: 139 MMHG | TEMPERATURE: 97.2 F | HEIGHT: 64 IN | RESPIRATION RATE: 18 BRPM | BODY MASS INDEX: 20.49 KG/M2 | WEIGHT: 120 LBS | HEART RATE: 77 BPM | OXYGEN SATURATION: 97 % | DIASTOLIC BLOOD PRESSURE: 54 MMHG

## 2023-06-23 DIAGNOSIS — Z99.2 ESRD (END STAGE RENAL DISEASE) ON DIALYSIS (HCC): ICD-10-CM

## 2023-06-23 DIAGNOSIS — N18.6 ESRD (END STAGE RENAL DISEASE) ON DIALYSIS (HCC): ICD-10-CM

## 2023-06-23 LAB
DME PARACHUTE DELIVERY DATE REQUESTED: NORMAL
DME PARACHUTE ITEM DESCRIPTION: NORMAL
DME PARACHUTE ORDER STATUS: NORMAL
DME PARACHUTE SUPPLIER NAME: NORMAL
DME PARACHUTE SUPPLIER PHONE: NORMAL
GLUCOSE SERPL-MCNC: 118 MG/DL (ref 65–140)
GLUCOSE SERPL-MCNC: 55 MG/DL (ref 65–140)
GLUCOSE SERPL-MCNC: 82 MG/DL (ref 65–140)

## 2023-06-23 PROCEDURE — 36902 INTRO CATH DIALYSIS CIRCUIT: CPT

## 2023-06-23 PROCEDURE — 99152 MOD SED SAME PHYS/QHP 5/>YRS: CPT | Performed by: RADIOLOGY

## 2023-06-23 PROCEDURE — 36907 BALO ANGIOP CTR DIALYSIS SEG: CPT

## 2023-06-23 PROCEDURE — 36901 INTRO CATH DIALYSIS CIRCUIT: CPT

## 2023-06-23 PROCEDURE — C1894 INTRO/SHEATH, NON-LASER: HCPCS

## 2023-06-23 PROCEDURE — 82948 REAGENT STRIP/BLOOD GLUCOSE: CPT

## 2023-06-23 PROCEDURE — C1769 GUIDE WIRE: HCPCS

## 2023-06-23 PROCEDURE — C1887 CATHETER, GUIDING: HCPCS

## 2023-06-23 PROCEDURE — 36902 INTRO CATH DIALYSIS CIRCUIT: CPT | Performed by: RADIOLOGY

## 2023-06-23 PROCEDURE — C1725 CATH, TRANSLUMIN NON-LASER: HCPCS

## 2023-06-23 PROCEDURE — 99153 MOD SED SAME PHYS/QHP EA: CPT

## 2023-06-23 PROCEDURE — 99152 MOD SED SAME PHYS/QHP 5/>YRS: CPT

## 2023-06-23 RX ORDER — SODIUM CHLORIDE 9 MG/ML
INJECTION, SOLUTION INTRAVENOUS
Status: COMPLETED | OUTPATIENT
Start: 2023-06-23 | End: 2023-06-23

## 2023-06-23 RX ORDER — DEXTROSE MONOHYDRATE 25 G/50ML
INJECTION, SOLUTION INTRAVENOUS AS NEEDED
Status: COMPLETED | OUTPATIENT
Start: 2023-06-23 | End: 2023-06-23

## 2023-06-23 RX ORDER — FENTANYL CITRATE 50 UG/ML
INJECTION, SOLUTION INTRAMUSCULAR; INTRAVENOUS AS NEEDED
Status: COMPLETED | OUTPATIENT
Start: 2023-06-23 | End: 2023-06-23

## 2023-06-23 RX ORDER — MIDAZOLAM HYDROCHLORIDE 2 MG/2ML
INJECTION, SOLUTION INTRAMUSCULAR; INTRAVENOUS AS NEEDED
Status: COMPLETED | OUTPATIENT
Start: 2023-06-23 | End: 2023-06-23

## 2023-06-23 RX ORDER — LIDOCAINE HYDROCHLORIDE 10 MG/ML
INJECTION, SOLUTION EPIDURAL; INFILTRATION; INTRACAUDAL; PERINEURAL AS NEEDED
Status: COMPLETED | OUTPATIENT
Start: 2023-06-23 | End: 2023-06-23

## 2023-06-23 RX ADMIN — IOHEXOL 25 ML: 350 INJECTION, SOLUTION INTRAVENOUS at 11:01

## 2023-06-23 RX ADMIN — FENTANYL CITRATE 25 MCG: 50 INJECTION, SOLUTION INTRAMUSCULAR; INTRAVENOUS at 10:07

## 2023-06-23 RX ADMIN — MIDAZOLAM HYDROCHLORIDE 0.5 MG: 1 INJECTION, SOLUTION INTRAMUSCULAR; INTRAVENOUS at 09:36

## 2023-06-23 RX ADMIN — LIDOCAINE HYDROCHLORIDE 10 ML: 10 INJECTION, SOLUTION EPIDURAL; INFILTRATION; INTRACAUDAL; PERINEURAL at 09:58

## 2023-06-23 RX ADMIN — DEXTROSE MONOHYDRATE 25 ML: 25 INJECTION, SOLUTION INTRAVENOUS at 09:30

## 2023-06-23 RX ADMIN — FENTANYL CITRATE 25 MCG: 50 INJECTION, SOLUTION INTRAMUSCULAR; INTRAVENOUS at 09:36

## 2023-06-23 RX ADMIN — SODIUM CHLORIDE 25 ML/HR: 0.9 INJECTION, SOLUTION INTRAVENOUS at 09:30

## 2023-06-23 RX ADMIN — FENTANYL CITRATE 25 MCG: 50 INJECTION, SOLUTION INTRAMUSCULAR; INTRAVENOUS at 09:59

## 2023-06-23 RX ADMIN — MIDAZOLAM HYDROCHLORIDE 0.5 MG: 1 INJECTION, SOLUTION INTRAMUSCULAR; INTRAVENOUS at 09:58

## 2023-06-23 RX ADMIN — MIDAZOLAM HYDROCHLORIDE 0.5 MG: 1 INJECTION, SOLUTION INTRAMUSCULAR; INTRAVENOUS at 10:07

## 2023-06-23 NOTE — DISCHARGE INSTRUCTIONS
[address]  Interventional Radiology  ([phone] TO KNOW:   Your arm or leg my  be sore, swollen, and bruised after the procedure  This is normal and should get better in a few days  DISCHARGE INSTRUCTIONS:     Contact Interventional Radiology at [phone] [place] PATIENTS: Contact Interventional Radiology at [phone]) Tanya [place] PATIENTS: Contact Interventional Radiology at [phone]) if:    You have a fever or chills  Your puncture site is red, swollen, or draining pus  You have nausea or are vomiting  Your skin is itchy, swollen, or you have a rash  You cannot feel a thrill over your graft or fistula  You have questions or concerns about your condition or care  Seek care immediately if:     You have bleeding that does not stop after 10 minutes of holding firm, direct pressure over the puncture site  Blood soaks through your bandage  Your hand or foot closest to the graft or fistula feels cold, painful, or numb  Your hand or foot closest to the graft or fistula is pale or blue  You have trouble moving your arm or leg closest to the graft or fistula  Your bruise suddenly gets bigger  Care for your wound as directed:  Remove the bandage in 4 to 6 hours or as         directed  Wash the area once a day with soap and water  Gently pat the area dry  Apply firm, steady pressure to the puncture site if it bleeds  Use a clean gauze or towel to hold pressure for 10 to 15 minutes  Call 911 if you cannot stop the bleeding or the bleeding gets heavier  Feel for a thrill once a day or as directed  Place your index and second finger over your fistula or graft as directed  You should feel a vibration  The vibration means that blood is flowing through your graft or fistula correctly  Rest your arm or leg as directed  Do not lift anything heavier than 5 pounds or do strenuous activity for 24 hours  Prevent damage to your graft or fistula  Do not wear tight-fitting clothing over your graft or fistula  Do not wear tight jewelry on the arm or leg with the graft or fistula  Tell healthcare providers not to do, IVs, blood draws, and blood pressure readings in the arm with your graft or fistula  Do not allow flu shots or vaccinations in your arm with your graft or fistula      Follow up with your healthcare provider as directed

## 2023-06-23 NOTE — H&P
Interventional Radiology  History and Physical 6/23/2023     Vitaliy Ye   1970   [id number]    Assessment/Plan:    44-year-old with right upper arm fistula receiving dialysis for several years  Last intervention approximately 2 years ago with outflow stenosis  He is now referred with prolonged bleeding high venous pressures  He reports that he only needs to hold pressure on his access site for 9 minutes but that the machines are not alarming  On exam there is an aneurysmal tense fistula suspicious for outflow stenosis the hand is well-perfused  We discussed risks benefits and alternatives including risk of bleeding access site complications damage to the fistula rupture or needing to place a covered stent     consent obtained        NPO  mp2 asa3    Problem List Items Addressed This Visit        Genitourinary    ESRD (end stage renal disease) on dialysis ([facility] )    Relevant Orders    IR AV fistulagram/graftogram          Subjective:     Patient ID: Vitaliy Ye is a 47 y o  male      History of Present Illness  As above    Review of Systems      Past Medical History:   Diagnosis Date   • Bacteremia 12/21/2018   • CAD (coronary artery disease)     s/p MARC to LCx, pLAD 2/2018   • Cardiac arrest [facility])    • Chronic kidney disease    • Diabetes mellitus type 1 ([facility] [address] )    • Dialysis patient [facility])     Access in right chest   • GI bleed    • Hyperlipidemia    • Hypertension    • Infection at site of external fixator pin [facility])    • MI (myocardial infarction) ([facility] [address] )    • My[facility] infarction ([facility] )    • Pneumonia     Last Assessed [id number]   • Renal failure    • Renal transplant, status post 07/21/2007        Past Surgical History:   Procedure Laterality Date   • AMPUTATION Right 02/2019    aboce knee   • ANKLE FRACTURE SURGERY     • ANKLE HARDWARE REMOVAL Right 7/31/2017    Procedure: REMOVAL HARDWARE ANKLE;  Surgeon: Zeny Victor MD;  Location: [facility];  Service: Orthopedics   • ANKLE HARDWARE REMOVAL Right 8/17/2017    Procedure: TIBIA FAILED HARDWARE REMOVAL;  Surgeon: Rachael Bright MD;  Location: [facility];  Service: Orthopedics   • CLOSED REDUCTION ANKLE Right 7/3/2017    Procedure: CLOSED REDUCTION DISTAL TIB-FIB AND CASTING VS;  Surgeon: Rachael Bright MD;  Location: [facility];  Service: Orthopedics   • CORONARY ANGIOPLASTY WITH STENT PLACEMENT  02/2018    CAD s/p MARC to LCx, pLAD   • ESOPHAGOGASTRODUODENOSCOPY N/A 12/20/2018    Procedure: ESOPHAGOGASTRODUODENOSCOPY (EGD) in ICU; Surgeon: Mannie Vo MD;  Location: [facility]; Service: Gastroenterology   • EYE SURGERY     • FRACTURE SURGERY      ORIF Rt Ankle   • GLUTAMIC ACID DECARBOXYLASE (HISTORICAL)     • IR AV FISTULAGRAM/GRAFTOGRAM  4/16/2020   • IR PICC LINE  8/20/2018   • IR TUNNELED DIALYSIS CATHETER CHECK/CHANGE/REPOSITION/ANGIOPLASTY  1/8/2020   • IR TUNNELED DIALYSIS CATHETER PLACEMENT  10/21/2019   • IR TUNNELED DIALYSIS CATHETER REMOVAL  5/29/2020   • IR VENOUS LINE REMOVAL  10/5/2018   • LEG AMPUTATION Right 02/01/2019    Above the knee   • NEPHRECTOMY TRANSPLANTED ORGAN     • UT ARTERIOVENOUS ANASTOMOSIS OPEN DIRECT Right 2/11/2020    Procedure: CREATION FISTULA ARTERIOVENOUS (AV) right upper extremity;  Surgeon: Charlotte Will MD;  Location: [facility] MAIN OR;  Service: Vascular   • UT OPEN TREATMENT FRACTURE DISTAL TIBIA FIBULA Right 7/3/2017    Procedure: OPEN REDUCTION W/ INTERNAL FIXATION (ORIF);   Surgeon: Rachael Bright MD;  Location: [facility];  Service: Orthopedics   • TOE AMPUTATION Right 10/27/2016    Procedure: AMPUTATION TOE;  Surgeon: Leonila Gresham DPM;  Location: [facility];  Service:         Social History     Tobacco Use   Smoking Status Never   Smokeless Tobacco Never        Social History     Substance and Sexual Activity   Alcohol Use Not Currently   • Alcohol/week: 0 0 standard drinks of alcohol        Social History     Substance and Sexual Activity   Drug Use Never        No Known Allergies    Current Outpatient Medications   Medication Sig Dispense Refill   • aspirin 81 mg chewable tablet Chew 81 mg every morning      • atorvastatin (LIPITOR) 80 mg tablet Take 1 tablet (80 mg total) by mouth every morning 90 tablet 3   • azaTHIOprine (IMURAN) 50 mg tablet Take 1 tablet (50 mg total) by mouth daily 90 tablet 3   • b complex-vitamin C-folic acid (NEPHROCAPS) 1 mg capsule Take 1 capsule by mouth daily 90 capsule 3   • carvedilol (COREG) 6 25 mg tablet Take 1 tablet (6 25 mg total) by mouth 2 (two) times a day with meals 180 tablet 3   • cinacalcet (SENSIPAR) 30 mg tablet Take 1 tablet (30 mg total) by mouth daily 90 tablet 3   • insulin aspart (NovoLOG) 100 units/mL injection Inject 10 Units under the skin 3 (three) times a day with meals (Patient taking differently: Inject 1-10 Units under the skin 3 (three) times a day with meals Pt uses Sliding scale at home) 30 mL 4   • insulin glargine (Semglee) 100 units/mL subcutaneous injection Inject 10 Units under the skin every 12 (twelve) hours 20 mL 3   • isosorbide mononitrate (IMDUR) 30 mg 24 hr tablet Take 1 tablet (30 mg total) by mouth daily 90 tablet 0   • metoprolol succinate (TOPROL-XL) 25 mg 24 hr tablet Take 1 tablet (25 mg total) by mouth daily 90 tablet 3   • NIFEdipine ER (ADALAT CC) 60 MG 24 hr tablet Take 1 tablet (60 mg total) by mouth daily 90 tablet 3   • pantoprazole (PROTONIX) 40 mg tablet Take 1 tablet (40 mg total) by mouth daily 90 tablet 3   • predniSONE 5 mg tablet Take 1 tablet (5 mg total) by mouth daily 90 tablet 3   • sevelamer carbonate (RENVELA) 800 mg tablet Take 1 tablet (800 mg total) by mouth 4 (four) times a day 360 tablet 3   • tacrolimus (PROGRAF) 1 mg capsule TAKE 2 CAPSULES BY MOUTH EVERY MORNING AND ONE CAPSULE EVERY DAY AT BEDTIME 270 capsule 0   • cholecalciferol (VITAMIN D3) 1,000 units tablet Take 5 tablets (5,000 Units total) by mouth daily for 14 days 70 tablet 0   • Glucagon, rDNA, (Glucagon Emergency) 1 MG KIT INJECT 1MG "SUBCUTANEOUSLY ONCE AS NEEDED FOR LOW BLOOD SUGAR FOR UP TO 1 DOSE 2 kit 0   • glucose blood (Contour Next Test) test strip Use as instructed 6 times daily 600 strip 5   • Insulin Syringe-Needle U-100 31G X 15/64\" 0 5 ML MISC Use 3 (three) times a day 100 each 4     No current facility-administered medications for this encounter  Objective:    Vitals:    06/23/23 0740   BP: 149/65   Pulse: 83   Resp: 18   Temp: 98 2 °F (36 8 °C)   TempSrc: Tympanic   SpO2: 100%   Weight: 54 4 kg (120 lb)   Height: 5' 4\" (1 626 m)        Physical Exam      Lab Results   Component Value Date     (H) 04/27/2023      Lab Results   Component Value Date    WBC 7 53 04/27/2023    HGB 12 2 04/27/2023    HCT 36 6 04/27/2023     (H) 04/27/2023     04/27/2023     Lab Results   Component Value Date    INR 0 87 04/27/2023    INR 0 96 10/10/2022    INR 1 11 10/03/2022    PROTIME 12 0 04/27/2023    PROTIME 12 9 10/10/2022    PROTIME 14 5 10/03/2022     Lab Results   Component Value Date    PTT 33 04/27/2023         I have personally reviewed pertinent imaging and laboratory results  Code Status: Prior  Advance Directive and Living Will:      Power of :    POLST:      This text is generated with voice recognition software  There may be translation, syntax,  or grammatical errors  If you have any questions, please contact the dictating provider     "

## 2023-06-23 NOTE — BRIEF OP NOTE (RAD/CATH)
INTERVENTIONAL RADIOLOGY PROCEDURE NOTE    Date: 6/23/2023    Procedure:   Procedure Summary     Date: 06/23/23 Room / Location: [facility] Interventional Radiology    Anesthesia Start:  Anesthesia Stop:     Procedure: IR AV FISTULAGRAM/GRAFTOGRAM Diagnosis:       ESRD (end stage renal disease) on dialysis ([address] )      (prolonged bleeding)    Scheduled Providers:  Responsible Provider:     Anesthesia Type: Not recorded ASA Status: Not recorded          Preoperative diagnosis:   1  ESRD (end stage renal disease) on dialysis [facility])         Postoperative diagnosis: Same  Surgeon: Tomy Mosqueda MD     Assistant: None  No qualified resident was available  Blood loss: 0    Specimens: 0     Findings:     R arm fistulagram    Cephalic arch stenosis high-grade treated with 8 mm high-pressure balloon angioplasty    2 additional areas noted but not treated at this time:  Questionable right brachiocephalic vein/inferior vena cava stenosis, possibly just inflow phenomena  Patient has no arm swelling and given presence of obvious culprit lesion this was not investigated further    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

## 2023-06-26 LAB
DME PARACHUTE DELIVERY DATE ACTUAL: NORMAL
DME PARACHUTE DELIVERY DATE REQUESTED: NORMAL
DME PARACHUTE ITEM DESCRIPTION: NORMAL
DME PARACHUTE ORDER STATUS: NORMAL
DME PARACHUTE SUPPLIER NAME: NORMAL
DME PARACHUTE SUPPLIER PHONE: NORMAL

## 2023-06-29 ENCOUNTER — DOCUMENTATION (OUTPATIENT)
Dept: OTHER | Facility: HOSPITAL | Age: 53
End: 2023-06-29

## 2023-06-29 DIAGNOSIS — I10 PRIMARY HYPERTENSION: ICD-10-CM

## 2023-06-29 DIAGNOSIS — Z94.0 KIDNEY TRANSPLANT STATUS: Primary | ICD-10-CM

## 2023-06-29 RX ORDER — NIFEDIPINE 90 MG/1
90 TABLET, FILM COATED, EXTENDED RELEASE ORAL DAILY
Qty: 90 TABLET | Refills: 3 | Status: SHIPPED | OUTPATIENT
Start: 2023-06-29

## 2023-06-29 RX ORDER — AZATHIOPRINE 50 MG/1
50 TABLET ORAL DAILY
Qty: 90 TABLET | Refills: 3 | Status: SHIPPED | OUTPATIENT
Start: 2023-06-29

## 2023-07-05 NOTE — PROGRESS NOTES
Cardiology Follow Up    Sheela Winston  1970  [id number]  [address] [facility]  [address] [address] [place] [address]  [phone]    1. Coronary artery disease involving native coronary artery of native heart without angina pectoris        2. Type 1 diabetes mellitus with chronic kidney disease on chronic dialysis ([address])        3. Hypertension secondary to other renal disorders        4. History of ischemic cardiomyopathy        5. Chronic diastolic congestive heart failure ([address])        6.  MARSH (dyspnea on exertion)  Echo complete w/ contrast if indicated          Discussion/Summary:    CAD  Prior hx of MARC to circumflex and prox LAD in 2018  Today he offers no complaints of chest pain but is noting some increased MARSH over the past few months; he feels this may be due to deconditioning as he has been a bit more sedentary lately; also could be in the setting of anemia and his Hgb has dropped from 12 to 10 over the past few months as well; we will order updated echo and he will review treatment for his anemia with his Nephrologist; if echo normal can consider updated ischemic workup  Continue aspirin/statin/beta blocker and Imdur    Hx of ICM  EF at time of stent placement was 40% now recovered to 65%   Echo from March 2021: EF 65%; mild MR/TR  Patient noting some MARSH as above  Will order updated echo    Essential HTN  BP on arrival slightly elevated at 142/70 down to 138/70 on recheck; he has not taken any of his meds yet this morning  He is prescribed Nifedipine 90 mg QD and Carvedilol 6.25 mg BID; he is still also taking metoprolol succinate 25 mg QD as he states when he stopped it after being prescribed carvedilol he feels his HR elevated to the 80's-90's and he didn't like it that high  We reviewed the danger of being on two beta blockers and he will stop metoprolol  He notes over the past few months his blood pressure has been elevated in the morning sometimes with systolic in the 453N to 958H prior to his medications but during the day systolic is typically much better controlled between the 100s to 120s; we will trial him taking nifedipine in the evening    DM1  HgbA1c 7.3 in May 2023    Chronic HFpEF  Volume status maintained by HD    hyperlipidemia  Lipid panel from May 2023  Triglycerides 51  HDL 43  LDL 20  Continue Lipitor 80 mg QD    He will return to the office in 6 months to follow up with Dr. Arslan Mcmahon    Interval History:   Radha Godinez is a 47 y.o. male with PMH of CAD, ICM now with recovered EF, HTN, DM1, chronic HFpEF and ESRD on HD who returns to the office today for routine follow up visit. Today the patient states that overall he feels fairly well but he is noticing some increased dyspnea on exertion over the past few months. The patient does admit that this may be from deconditioning as he states he has been a little more sedentary than usual.  He states is not limiting to his activities but he is just feeling that it is a little more prominent than it had been in the past.  He also states that his hemoglobin level has dropped over the past few months from 12 down to 10 range and this may be a causative factor as well. We will order an updated echo to further assess LVEF given his history of ischemic cardiomyopathy and we will also assess for any wall motion abnormalities. He is going to speak with his nephrology team regarding treatment for his anemia. If echocardiogram comes back normalwe can consider updated ischemic work-up. However, the patient denies any chest discomfort with exertion he also denies any palpitations. He feels his volume status is well controlled with dialysis and he typically does not have any lower extremity edema or orthopnea in between treatments. He also denies any dizziness/lightheadedness or near syncope/syncope.   His blood pressure is mildly elevated today at 142/70 but did come down to 138/70 on recheck. He does state that he follows a low-sodium diet. The patient is currently prescribed nifedipine 90 mg daily which he takes in the morning. He is also prescribed carvedilol 6.25 milligrams twice daily. The patient is also admitted to taking metoprolol succinate 25 mg daily as he states that when he was told to stop this medication upon starting carvedilol he feels his heart rate went up into the 80s and 90s and he was uncomfortable with that heart rate ranged therefore he restarted metoprolol in addition to carvedilol. I did discuss with him the dangers of being on 2 beta-blockers and we will stop metoprolol and he is in agreement. Overall his blood pressures have been pretty well controlled over the past few months but lately he states that his blood pressures have been elevated first thing in the morning prior to taking his medications usually with systolics in the 791I to 281J. We did discuss that he could trial taking nifedipine in the evening with his second dose of carvedilol and he states he will also discuss his blood pressure issues with nephrology team as well.     Medical Problems     Problem List     Type 1 diabetes mellitus ([address])      Lab Results   Component Value Date    HGBA1C 7.3 (H) 05/17/2023         Renal transplant, status post    Hyperkalemia    Osteomyelitis (HCC)    Poor circulation    Closed fracture of distal end of right tibia with routine healing    Chronic osteomyelitis of tibia (HCC)    Immunosuppression (HCC)    Hypertension    Elevated troponin    Diarrhea    Cellulitis of ankle    Non-ST elevation myocardial infarction (NSTEMI), type 2    Urinary retention (Chronic)    Severe sepsis ([address])    Hyperphosphatemia    Hyponatremia    Gastrointestinal hemorrhage    Overview Signed 12/20/2018 12:49 PM by GAVINO Nunez     Added automatically from request for surgery [id number]         S/P AKA (above knee amputation), right ([address])    Acute blood loss anemia    Acute respiratory failure with hypoxia (HCC)    Pulmonary hypertension (HCC)    Chronic anemia    Depressed left ventricular ejection fraction    Acute pulmonary edema (HCC)    Hx of right BKA (HCC)    Hypertensive urgency    ESRD (end stage renal disease) on dialysis [facility])    Lab Results   Component Value Date    EGFR 17 04/27/2023    EGFR 8 10/12/2022    EGFR 5 10/11/2022    CREATININE 3.68 (H) 04/27/2023    CREATININE 6.62 (H) 10/12/2022    CREATININE 9.29 (H) 10/11/2022         Coronary artery disease involving native coronary artery of native heart without angina pectoris    Pre-operative cardiovascular examination    Chronic kidney disease, unspecified    Overview Signed 1/15/2020  1:38 PM by Mario Murcia MD     Added automatically from request for surgery [id number]         Lab Results   Component Value Date    EGFR 17 04/27/2023    EGFR 8 10/12/2022    EGFR 5 10/11/2022    CREATININE 3.68 (H) 04/27/2023    CREATININE 6.62 (H) 10/12/2022    CREATININE 9.29 (H) 10/11/2022         Pancreatic lesion    Abnormal CT scan, colon    Respiratory distress    Community acquired pneumonia of right lower lobe of lung    HCAP (healthcare-associated pneumonia)    Acute on chronic respiratory failure with hypoxia (HCC)    Elevated MCV    Hyperbilirubinemia    Acute on chronic diastolic CHF (congestive heart failure) (HCC)    Wt Readings from Last 3 Encounters:   06/23/23 54.4 kg (120 lb)   10/11/22 50.4 kg (111 lb 1.8 oz)   10/03/22 52.7 kg (116 lb 2.9 oz)                 Elevated procalcitonin    Mitral valve insufficiency    Abnormal EKG    Vasovagal syncope    Chronic diastolic congestive heart failure (HCC)    Wt Readings from Last 3 Encounters:   06/23/23 54.4 kg (120 lb)   10/11/22 50.4 kg (111 lb 1.8 oz)   10/03/22 52.7 kg (116 lb 2.9 oz)                     Past Medical History:   Diagnosis Date   • Bacteremia 12/21/2018   • CAD (coronary artery disease)     s/p MARC to LCx, pLAD 2/2018   • Cardiac arrest [facility])    • Chronic kidney disease    • Diabetes mellitus type 1 ([facility])    • Dialysis patient [facility])     Access in right chest   • GI bleed    • Hyperlipidemia    • Hypertension    • Infection at site of external fixator pin [facility])    • MI (myocardial infarction) ([facility])    • Myocardial infarction [facility])    • Pneumonia     Last Assessed [id number]   • Renal failure    • Renal transplant, status post 07/21/2007     Social History     Socioeconomic History   • Marital status: /Civil Union     Spouse name: Not on file   • Number of children: Not on file   • Years of education: Not on file   • Highest education level: Not on file   Occupational History   • Not on file   Tobacco Use   • Smoking status: Never   • Smokeless tobacco: Never   Vaping Use   • Vaping Use: Never used   Substance and Sexual Activity   • Alcohol use: Not Currently     Alcohol/week: 0.0 standard drinks of alcohol   • Drug use: Never   • Sexual activity: Yes     Partners: Female   Other Topics Concern   • Not on file   Social History Narrative    No living will     Social Determinants of Health     Financial Resource Strain: Not on file   Food Insecurity: No Food Insecurity (10/10/2022)    Hunger Vital Sign    • Worried About Running Out of Food in the Last Year: Never true    • Ran Out of Food in the Last Year: Never true   Transportation Needs: No Transportation Needs (10/10/2022)    PRAPARE - Transportation    • Lack of Transportation (Medical): No    • Lack of Transportation (Non-Medical):  No   Physical Activity: Not on file   Stress: Not on file   Social Connections: Not on file   Intimate Partner Violence: Not on file   Housing Stability: Low Risk  (10/10/2022)    Housing Stability Vital Sign    • Unable to Pay for Housing in the Last Year: No    • Number of Places Lived in the Last Year: 1    • Unstable Housing in the Last Year: No      Family History   Problem Relation Age of Onset   • Diabetes Brother    • Coronary artery disease Mother    • No Known Problems Father      Past Surgical History:   Procedure Laterality Date   • AMPUTATION Right 02/2019    aboce knee   • ANKLE FRACTURE SURGERY     • ANKLE HARDWARE REMOVAL Right 7/31/2017    Procedure: REMOVAL HARDWARE ANKLE;  Surgeon: Saman Prajapati MD;  Location: [facility];  Service: Orthopedics   • ANKLE HARDWARE REMOVAL Right 8/17/2017    Procedure: TIBIA FAILED HARDWARE REMOVAL;  Surgeon: Samna Prajapati MD;  Location: [facility];  Service: Orthopedics   • CLOSED REDUCTION ANKLE Right 7/3/2017    Procedure: CLOSED REDUCTION DISTAL TIB-FIB AND CASTING VS;  Surgeon: Saman Prajapati MD;  Location: [facility];  Service: Orthopedics   • CORONARY ANGIOPLASTY WITH STENT PLACEMENT  02/2018    CAD s/p MARC to LCx, pLAD   • ESOPHAGOGASTRODUODENOSCOPY N/A 12/20/2018    Procedure: ESOPHAGOGASTRODUODENOSCOPY (EGD) in ICU; Surgeon: Zoë Jiménez MD;  Location: [facility]; Service: Gastroenterology   • EYE SURGERY     • FRACTURE SURGERY      ORIF Rt Ankle   • GLUTAMIC ACID DECARBOXYLASE (HISTORICAL)     • IR AV FISTULAGRAM/GRAFTOGRAM  4/16/2020   • IR AV FISTULAGRAM/GRAFTOGRAM  6/23/2023   • IR PICC LINE  8/20/2018   • IR TUNNELED DIALYSIS CATHETER CHECK/CHANGE/REPOSITION/ANGIOPLASTY  1/8/2020   • IR TUNNELED DIALYSIS CATHETER PLACEMENT  10/21/2019   • IR TUNNELED DIALYSIS CATHETER REMOVAL  5/29/2020   • IR VENOUS LINE REMOVAL  10/5/2018   • LEG AMPUTATION Right 02/01/2019    Above the knee   • NEPHRECTOMY TRANSPLANTED ORGAN     • OH ARTERIOVENOUS ANASTOMOSIS OPEN DIRECT Right 2/11/2020    Procedure: CREATION FISTULA ARTERIOVENOUS (AV) right upper extremity;  Surgeon: Pradip Vergara MD;  Location: [facility] MAIN OR;  Service: Vascular   • OH OPEN TREATMENT FRACTURE DISTAL TIBIA FIBULA Right 7/3/2017    Procedure: OPEN REDUCTION W/ INTERNAL FIXATION (ORIF);   Surgeon: Saman Prajapati MD;  Location: [facility];  Service: Orthopedics   • TOE AMPUTATION Right 10/27/2016    Procedure: AMPUTATION TOE;  Surgeon: Mauri Raymond Kalli Jensen DPM;  Location: [facility];  Service:        Current Outpatient Medications:   •  aspirin 81 mg chewable tablet, Chew 81 mg every morning , Disp: , Rfl:   •  atorvastatin (LIPITOR) 80 mg tablet, Take 1 tablet (80 mg total) by mouth every morning, Disp: 90 tablet, Rfl: 3  •  azaTHIOprine (IMURAN) 50 mg tablet, Take 1 tablet (50 mg total) by mouth daily, Disp: 90 tablet, Rfl: 3  •  carvedilol (COREG) 6.25 mg tablet, Take 1 tablet (6.25 mg total) by mouth 2 (two) times a day with meals, Disp: 180 tablet, Rfl: 3  •  cholecalciferol (VITAMIN D3) 1,000 units tablet, Take 5 tablets (5,000 Units total) by mouth daily for 14 days, Disp: 70 tablet, Rfl: 0  •  cinacalcet (SENSIPAR) 30 mg tablet, Take 1 tablet (30 mg total) by mouth daily, Disp: 90 tablet, Rfl: 3  •  Glucagon, rDNA, (Glucagon Emergency) 1 MG KIT, INJECT 1MG SUBCUTANEOUSLY ONCE AS NEEDED FOR LOW BLOOD SUGAR FOR UP TO 1 DOSE, Disp: 2 kit, Rfl: 0  •  glucose blood (Contour Next Test) test strip, Use as instructed 6 times daily, Disp: 600 strip, Rfl: 5  •  insulin aspart (NovoLOG) 100 units/mL injection, Inject 10 Units under the skin 3 (three) times a day with meals (Patient taking differently: Inject 1-10 Units under the skin 3 (three) times a day with meals Pt uses Sliding scale at home), Disp: 30 mL, Rfl: 4  •  insulin glargine (Semglee) 100 units/mL subcutaneous injection, Inject 10 Units under the skin every 12 (twelve) hours, Disp: 20 mL, Rfl: 3  •  Insulin Syringe-Needle U-100 31G X 15/64" 0.5 ML MISC, Use 3 (three) times a day, Disp: 100 each, Rfl: 4  •  isosorbide mononitrate (IMDUR) 30 mg 24 hr tablet, Take 1 tablet (30 mg total) by mouth daily, Disp: 90 tablet, Rfl: 0  •  NIFEdipine (ADALAT CC) 90 mg 24 hr tablet, Take 1 tablet (90 mg total) by mouth daily, Disp: 90 tablet, Rfl: 3  •  pantoprazole (PROTONIX) 40 mg tablet, Take 1 tablet (40 mg total) by mouth daily, Disp: 90 tablet, Rfl: 3  •  predniSONE 5 mg tablet, Take 1 tablet (5 mg total) by mouth daily, Disp: 90 tablet, Rfl: 3  •  sevelamer carbonate (RENVELA) 800 mg tablet, Take 1 tablet (800 mg total) by mouth 4 (four) times a day, Disp: 360 tablet, Rfl: 3  •  tacrolimus (PROGRAF) 1 mg capsule, TAKE 2 CAPSULES BY MOUTH EVERY MORNING AND ONE CAPSULE EVERY DAY AT BEDTIME, Disp: 270 capsule, Rfl: 0  •  b complex-vitamin C-folic acid (NEPHROCAPS) 1 mg capsule, Take 1 capsule by mouth daily (Patient not taking: Reported on 7/7/2023), Disp: 90 capsule, Rfl: 3  No Known Allergies    Labs:     Chemistry        Component Value Date/Time     11/06/2015 0734    K 3.4 (L) 04/27/2023 1058    K 4.4 11/06/2015 0734    CL 97 04/27/2023 1058     11/06/2015 0734    CO2 26 04/27/2023 1058    CO2 13 (L) 02/14/2018 0133    BUN 19 04/27/2023 1058    BUN 31 (H) 11/06/2015 0734    CREATININE 3.68 (H) 04/27/2023 1058    CREATININE 1.26 11/06/2015 0734        Component Value Date/Time    CALCIUM 9.3 04/27/2023 1058    CALCIUM 8.8 11/06/2015 0734    ALKPHOS 113 (H) 04/27/2023 1058    ALKPHOS 164 (H) 11/06/2015 0734    AST 17 04/27/2023 1058    AST 12 11/06/2015 0734    ALT 17 04/27/2023 1058    ALT 34 11/06/2015 0734    BILITOT 0.45 11/06/2015 0734            Lab Results   Component Value Date    CHOL 105 08/18/2015     Lab Results   Component Value Date    HDL 43 05/17/2023    HDL 41 08/21/2022    HDL 35 (L) 05/22/2021     Lab Results   Component Value Date    LDLCALC 20 05/17/2023    LDLCALC 29 08/21/2022    LDLCALC 37 05/22/2021     Lab Results   Component Value Date    TRIG 51 05/17/2023    TRIG 54 08/21/2022    TRIG 51 05/22/2021     No results found for: "CHOLHDL"    Imaging: IR AV fistulagram/graftogram    Result Date: 6/24/2023  Narrative: Arteriovenous fistulogram with angioplasty Clinical History: Right arm fistula high venous pressures. No arm swelling. End-stage renal disease. Last fistulogram 2 years ago.  Contrast: 30 cc Omnipaque 350 Fluoro time: 4.3 minutes Conscious sedation time: 70 minutes Note: Intravenous access Technique: The patient was brought to the interventional radiology holding area and identified verbally and by wristband. After discussion of procedure and its details, risks, benefits, and alternatives both verbal and written informed consent were obtained. On initial exam the fistula was markedly pulsatile with palpable thrill. The cephalic vein was distended up to the level of the shoulder. The patient was then brought to the interventional radiology suite and placed supine on the table. Survey ultrasound was performed. The existing right upper extremity fistula was prepped and draped in usual sterile fashion. All elements of maximal sterile barrier technique were followed (cap, mask, sterile gown, sterile gloves, large sterile sheet, hand hygiene and 2% chlorhexidine for cutaneous antisepsis). Sterile ultrasound technique with sterile gel and sterile probe covers were also utilized. A preprocedure timeout was performed per standard department protocol. Under sterile ultrasound lidocaine was administered to the skin and again under sterile ultrasound a microneedle was advanced into the fistula, directed towards the central outflow. Next, a microwire was advanced through the microneedle, which was exchanged for a micropuncture sheath. The wire and inner dilator were removed. An arteriovenous fistulogram was performed from the access site to the superior vena cava. Next, an [place] was advanced through the micropuncture sheath, which was subsequently exchanged for a short 6 [place] vascular sheath. Reflux angiography of the arterial inflow was performed. Using an angled catheter roadmap angiography the stenosis was crossed and a long exchange Bentson wire was placed in the inferior vena cava. Angioplasty was performed with a high-pressure 8 mm conquest balloon of the cephalic arch stenosis for 3 minutes. Post angioplasty imaging was performed.  Decision was made to conclude the procedure. All wires catheters and sheaths were removed and hemostasis obtained with removable pursestring. Patient tolerated the procedure without immediate complication. A small amount of blood leakage was noted at the puncture site, patient was counseled he will likely developed a bruise. Findings: Widely patent fistula by ultrasound. Aneurysmal dilation of the access sites. On ultrasound the arterial anastomosis is widely patent but there is suspicion for stenosis in the cephalic arch. On fistulography this high-grade cephalic arch focal stenosis is confirmed. Images of balloon across the lesion and excellent response with wide flow. two Additional areas that may need attention in the future are noted: -Questionable brachiocephalic vein stenosis although this may represent jugular and left-sided inflow phenomenon. Patient has no arm swelling therefore this was not investigated further -Relative stenosis immediately proximal to the access zone. Given that arterial pressures have been adequate and there is excellent flow it is unclear if this represents nondilated vein with additional vein around it. It will be easy to perform angioplasty if required. If arterial inflow becomes insufficient this would likely be the target for treatment. I discussed both of the above with the patient. Impression: Impression: Cephalic arch stenosis treated with high-pressure balloon angioplasty. The existing access sites are aneurysmal. I recommended to the patient that he discuss moving access points of the arm a few centimeters as the vein is well-developed. Additional juxta anastomotic stenosis not treated at this time given clinical indication. Questionable brachiocephalic vein stenosis also not treated at this time given lack of arm swelling and culprit lesion. Attention should be paid to both these regions in the future.  Workstation performed: [id number]       Review of Systems   Constitutional: Negative for chills, fever and malaise/fatigue. HENT: Negative for congestion. Cardiovascular: Positive for dyspnea on exertion. Negative for chest pain, leg swelling, orthopnea and palpitations. Respiratory: Negative for cough, shortness of breath (no SOB at rest) and wheezing. Endocrine: Negative. Hematologic/Lymphatic: Negative. Skin: Negative. Musculoskeletal: Negative. Gastrointestinal: Negative for bloating, abdominal pain, nausea and vomiting. Genitourinary: Negative. Neurological: Negative for dizziness and light-headedness. Psychiatric/Behavioral: Negative. All other systems reviewed and are negative. Vitals:    07/07/23 0809   BP: 142/70   Pulse: 79   Temp: 98 °F (36.7 °C)     Vitals:     Height: 5' 4" (162.6 cm)   Body mass index is 20.6 kg/m². Physical Exam:  Physical Exam  Vitals reviewed. Constitutional:       General: He is not in acute distress. HENT:      Head: Normocephalic and atraumatic. Mouth/Throat:      Mouth: Mucous membranes are moist.   Cardiovascular:      Rate and Rhythm: Normal rate and regular rhythm. Heart sounds: Normal heart sounds, S1 normal and S2 normal. No murmur heard. Pulmonary:      Effort: Pulmonary effort is normal. No respiratory distress. Breath sounds: Normal breath sounds. Abdominal:      General: Bowel sounds are normal. There is no distension. Palpations: Abdomen is soft. Musculoskeletal:         General: Normal range of motion. Cervical back: Normal range of motion and neck supple. Right lower leg: No edema. Left lower leg: No edema. Comments: Right LE amp   Skin:     General: Skin is warm and dry. Neurological:      Mental Status: He is alert and oriented to person, place, and time.    Psychiatric:         Mood and Affect: Mood normal.

## 2023-07-07 ENCOUNTER — OFFICE VISIT (OUTPATIENT)
Dept: CARDIOLOGY CLINIC | Facility: HOSPITAL | Age: 53
End: 2023-07-07
Payer: MEDICARE

## 2023-07-07 VITALS
HEART RATE: 79 BPM | TEMPERATURE: 98 F | SYSTOLIC BLOOD PRESSURE: 142 MMHG | HEIGHT: 64 IN | DIASTOLIC BLOOD PRESSURE: 70 MMHG | BODY MASS INDEX: 20.6 KG/M2

## 2023-07-07 DIAGNOSIS — N18.6 TYPE 1 DIABETES MELLITUS WITH CHRONIC KIDNEY DISEASE ON CHRONIC DIALYSIS (HCC): ICD-10-CM

## 2023-07-07 DIAGNOSIS — R06.09 DOE (DYSPNEA ON EXERTION): ICD-10-CM

## 2023-07-07 DIAGNOSIS — Z86.79 HISTORY OF ISCHEMIC CARDIOMYOPATHY: ICD-10-CM

## 2023-07-07 DIAGNOSIS — N28.89 HYPERTENSION SECONDARY TO OTHER RENAL DISORDERS: ICD-10-CM

## 2023-07-07 DIAGNOSIS — Z99.2 TYPE 1 DIABETES MELLITUS WITH CHRONIC KIDNEY DISEASE ON CHRONIC DIALYSIS (HCC): ICD-10-CM

## 2023-07-07 DIAGNOSIS — E78.5 HYPERLIPIDEMIA, UNSPECIFIED HYPERLIPIDEMIA TYPE: ICD-10-CM

## 2023-07-07 DIAGNOSIS — I25.10 CORONARY ARTERY DISEASE INVOLVING NATIVE CORONARY ARTERY OF NATIVE HEART WITHOUT ANGINA PECTORIS: Primary | ICD-10-CM

## 2023-07-07 DIAGNOSIS — I15.1 HYPERTENSION SECONDARY TO OTHER RENAL DISORDERS: ICD-10-CM

## 2023-07-07 DIAGNOSIS — I50.32 CHRONIC DIASTOLIC CONGESTIVE HEART FAILURE (HCC): ICD-10-CM

## 2023-07-07 DIAGNOSIS — E10.22 TYPE 1 DIABETES MELLITUS WITH CHRONIC KIDNEY DISEASE ON CHRONIC DIALYSIS (HCC): ICD-10-CM

## 2023-07-07 PROCEDURE — 99214 OFFICE O/P EST MOD 30 MIN: CPT | Performed by: NURSE PRACTITIONER

## 2023-07-31 DIAGNOSIS — E83.39 HYPERPHOSPHATEMIA: ICD-10-CM

## 2023-07-31 RX ORDER — SEVELAMER CARBONATE 800 MG/1
800 TABLET, FILM COATED ORAL 4 TIMES DAILY
Qty: 360 TABLET | Refills: 4 | Status: SHIPPED | OUTPATIENT
Start: 2023-07-31

## 2023-08-07 ENCOUNTER — HOSPITAL ENCOUNTER (OUTPATIENT)
Dept: NON INVASIVE DIAGNOSTICS | Facility: HOSPITAL | Age: 53
Discharge: HOME | End: 2023-08-07
Payer: MEDICARE

## 2023-08-07 VITALS
DIASTOLIC BLOOD PRESSURE: 66 MMHG | BODY MASS INDEX: 20.49 KG/M2 | HEIGHT: 64 IN | WEIGHT: 120 LBS | SYSTOLIC BLOOD PRESSURE: 120 MMHG | HEART RATE: 70 BPM

## 2023-08-07 DIAGNOSIS — R06.09 DOE (DYSPNEA ON EXERTION): ICD-10-CM

## 2023-08-07 LAB
AORTIC ROOT: 2.9 CM
AORTIC VALVE MEAN VELOCITY: 10.8 M/S
APICAL FOUR CHAMBER EJECTION FRACTION: 70 %
AV LVOT MEAN GRADIENT: 4 MMHG
AV LVOT PEAK GRADIENT: 6 MMHG
AV MEAN GRADIENT: 5 MMHG
AV PEAK GRADIENT: 11 MMHG
AV VELOCITY RATIO: 0.77
DOP CALC AO PEAK VEL: 1.64 M/S
DOP CALC AO VTI: 39.16 CM
DOP CALC LVOT PEAK VEL VTI: 30.64 CM
DOP CALC LVOT PEAK VEL: 1.27 M/S
E WAVE DECELERATION TIME: 174 MS
FRACTIONAL SHORTENING: 40 % (ref 28–44)
INTERVENTRICULAR SEPTUM IN DIASTOLE (PARASTERNAL SHORT AXIS VIEW): 1.2 CM
INTERVENTRICULAR SEPTUM: 1.2 CM (ref 0.6–1.1)
LAAS-AP2: 24.5 CM2
LAAS-AP4: 20.7 CM2
LEFT ATRIUM SIZE: 4.5 CM
LEFT ATRIUM VOLUME (MOD BIPLANE): 73 ML
LEFT INTERNAL DIMENSION IN SYSTOLE: 2.6 CM (ref 2.1–4)
LEFT VENTRICULAR INTERNAL DIMENSION IN DIASTOLE: 4.3 CM (ref 3.5–6)
LEFT VENTRICULAR POSTERIOR WALL IN END DIASTOLE: 1.2 CM
LEFT VENTRICULAR STROKE VOLUME: 58 ML
LVSV (TEICH): 58 ML
MV E'TISSUE VEL-SEP: 9 CM/S
MV PEAK A VEL: 1.16 M/S
MV PEAK E VEL: 122 CM/S
MV STENOSIS PRESSURE HALF TIME: 51 MS
MV VALVE AREA P 1/2 METHOD: 4.31 CM2
RA PRESSURE ESTIMATED: 3 MMHG
RIGHT ATRIUM AREA SYSTOLE A4C: 9.4 CM2
RIGHT VENTRICLE ID DIMENSION: 2.5 CM
RV PSP: 42 MMHG
SL CV LEFT ATRIUM LENGTH A2C: 5.3 CM
SL CV LV EF: 60
SL CV PED ECHO LEFT VENTRICLE DIASTOLIC VOLUME (MOD BIPLANE) 2D: 83 ML
SL CV PED ECHO LEFT VENTRICLE SYSTOLIC VOLUME (MOD BIPLANE) 2D: 25 ML
TR MAX PG: 39 MMHG
TR PEAK VELOCITY: 3.1 M/S
TRICUSPID ANNULAR PLANE SYSTOLIC EXCURSION: 2.5 CM
TRICUSPID VALVE PEAK REGURGITATION VELOCITY: 3.14 M/S

## 2023-08-07 PROCEDURE — 93306 TTE W/DOPPLER COMPLETE: CPT

## 2023-08-07 PROCEDURE — 93306 TTE W/DOPPLER COMPLETE: CPT | Performed by: INTERNAL MEDICINE

## 2023-08-20 DIAGNOSIS — E10.22 TYPE 1 DIABETES MELLITUS WITH END-STAGE RENAL DISEASE (ESRD) (HCC): ICD-10-CM

## 2023-08-20 DIAGNOSIS — N18.6 TYPE 1 DIABETES MELLITUS WITH END-STAGE RENAL DISEASE (ESRD) (HCC): ICD-10-CM

## 2023-08-21 ENCOUNTER — TELEPHONE (OUTPATIENT)
Dept: FAMILY MEDICINE CLINIC | Facility: CLINIC | Age: 53
End: 2023-08-21

## 2023-08-21 VITALS — DIASTOLIC BLOOD PRESSURE: 58 MMHG | SYSTOLIC BLOOD PRESSURE: 130 MMHG | WEIGHT: 113.4 LBS | BODY MASS INDEX: 19.47 KG/M2

## 2023-08-21 DIAGNOSIS — N18.6 TYPE 1 DIABETES MELLITUS WITH END-STAGE RENAL DISEASE (ESRD) (HCC): ICD-10-CM

## 2023-08-21 DIAGNOSIS — E10.22 TYPE 1 DIABETES MELLITUS WITH END-STAGE RENAL DISEASE (ESRD) (HCC): ICD-10-CM

## 2023-09-22 DIAGNOSIS — I25.10 CORONARY ARTERY DISEASE INVOLVING NATIVE CORONARY ARTERY OF NATIVE HEART WITHOUT ANGINA PECTORIS: ICD-10-CM

## 2023-09-22 RX ORDER — CARVEDILOL 6.25 MG/1
6.25 TABLET ORAL 2 TIMES DAILY WITH MEALS
Qty: 180 TABLET | Refills: 3 | Status: SHIPPED | OUTPATIENT
Start: 2023-09-22

## 2023-09-26 DIAGNOSIS — Z94.0 RENAL TRANSPLANT, STATUS POST: ICD-10-CM

## 2023-09-26 RX ORDER — TACROLIMUS 1 MG/1
CAPSULE ORAL
Qty: 270 CAPSULE | Refills: 3 | Status: SHIPPED | OUTPATIENT
Start: 2023-09-26

## 2023-10-17 DIAGNOSIS — I12.9 HYPERTENSIVE RENAL DISEASE: Primary | ICD-10-CM

## 2023-10-17 RX ORDER — NIFEDIPINE 30 MG/1
120 TABLET, EXTENDED RELEASE ORAL DAILY
Qty: 360 TABLET | Refills: 3 | Status: SHIPPED | OUTPATIENT
Start: 2023-10-17

## 2023-10-22 RX ORDER — ISOSORBIDE MONONITRATE 30 MG/1
30 TABLET, EXTENDED RELEASE ORAL DAILY
Qty: 90 TABLET | Refills: 0 | Status: SHIPPED | OUTPATIENT
Start: 2023-10-22

## 2023-10-22 RX ORDER — METOPROLOL SUCCINATE 50 MG/1
50 TABLET, EXTENDED RELEASE ORAL DAILY
Qty: 90 TABLET | Refills: 0 | Status: SHIPPED | OUTPATIENT
Start: 2023-10-22

## 2023-10-24 DIAGNOSIS — N17.9 ACUTE KIDNEY INJURY (HCC): ICD-10-CM

## 2023-10-24 DIAGNOSIS — I10 HYPERTENSION, UNSPECIFIED TYPE: ICD-10-CM

## 2023-10-24 RX ORDER — ATORVASTATIN CALCIUM 80 MG/1
80 TABLET, FILM COATED ORAL DAILY
Qty: 90 TABLET | Refills: 3 | Status: SHIPPED | OUTPATIENT
Start: 2023-10-24

## 2023-10-24 RX ORDER — PREDNISONE 5 MG/1
5 TABLET ORAL DAILY
Qty: 90 TABLET | Refills: 3 | Status: SHIPPED | OUTPATIENT
Start: 2023-10-24

## 2023-11-03 ENCOUNTER — TELEPHONE (OUTPATIENT)
Dept: FAMILY MEDICINE CLINIC | Facility: CLINIC | Age: 53
End: 2023-11-03

## 2023-11-03 DIAGNOSIS — R29.898 MUSCULAR DECONDITIONING: Primary | ICD-10-CM

## 2023-11-28 DIAGNOSIS — K92.0 GASTROINTESTINAL HEMORRHAGE WITH HEMATEMESIS: ICD-10-CM

## 2023-11-28 RX ORDER — PANTOPRAZOLE SODIUM 40 MG/1
40 TABLET, DELAYED RELEASE ORAL DAILY
Qty: 90 TABLET | Refills: 3 | Status: ON HOLD | OUTPATIENT
Start: 2023-11-28

## 2023-11-29 NOTE — PROGRESS NOTES
PT Evaluation     Today's date: 2023  Patient name: Jo Lacy  : 1970  MRN: [id number]  Referring provider: Tika Ramsey DO  Dx:   Encounter Diagnosis     ICD-10-CM    1. Muscular deconditioning  R29.898 Ambulatory referral to Physical Therapy      2. Ambulatory dysfunction  R26.2                      Assessment  Assessment details: The patient is a 45 y/o male who presents to PT with diagnosis of muscular deconditioning. He has complaints of pain. His LE ROM is WFL. He demonstrates deficits with decreased BLE strength, decreased flexibility, antalgic gait with use of AD and decreased balance and proprioception. His 5x STS time is 20" with use of BUE to complete. TUG score is 25" with use of RW. He is limited with all times when standing with NBOS (EO and EC), staggered stance and he is unable to complete SLS. Based on objective measures and special tests he is at risk of falls though he denies any falls at home. He ambulates primarily with use of RW for household and community distances. He is going up and down the steps with non-reciprocal gait pattern but does have someone stand by for safety, does not need assist from them. Secondary to amputation and above deficits he is limited with his overall mobility and function. The patient would benefit from continued PT to address deficits and improve function. Tx to include ROM, stretching, strengthening, modalities, HEP, pt education, postural ed, lifting/body mechanics, neuro re-ed, balance/proprioception Te, MT and equipment. He is going to call his prosthetist at [facility] about getting an adjustment made, feels it is too short.     Impairments: abnormal gait, abnormal or restricted ROM, activity intolerance, impaired balance, impaired physical strength, lacks appropriate home exercise program, pain with function and poor posture   Other impairment: decreased flexibility  Understanding of Dx/Px/POC: good   Prognosis: good    Goals  STGs:  1. Initiate and complete HEP with verbal cues. 2.  Improve BLE strength by 1/2 grade in 4 weeks. 3.  Improve 5x STS and TUG time by > 5" in 4 weeks. LTGs:  1. Patient to be I with HEP in 12 weeks. 2.  Improve TUG time to < 15" with least restrictive AD in 12 weeks to improve function. 3.  Improve BLE strength to > 4/5 t/o in 12 weeks to improve function. 4.  Improve 5x STS to < 12" in 12 weeks to improve function and decrease fall risk. 5.  Ambulation is improved to maximal level of function in 12 weeks. 6.  Stair negotiation is improved to maximal level of function in 12 weeks. 7.  Recreational performance in related activities is improved to maximal level of function in 12 weeks. 8.  ADL performance is improved to maximal level of function in 12 weeks. 9.  Improve NBOS stance to > 20" in 12 weeks to decrease fall risk. 10.  Improve staggered stance to > 20" in 12 weeks to decrease fall risk. Plan  Plan details: Modalities and therapy interventions prn. Patient would benefit from: skilled physical therapy  Planned modality interventions: cryotherapy and thermotherapy: hydrocollator packs  Planned therapy interventions: abdominal trunk stabilization, manual therapy, balance, balance/weight bearing training, neuromuscular re-education, patient education, postural training, self care, strengthening, stretching, therapeutic activities, therapeutic exercise, flexibility, functional ROM exercises, gait training, home exercise program, transfer training and prosthetic fitting/training  Frequency: 3x week (2-3 times/week)  Duration in weeks: 12  Plan of Care beginning date: 12/1/2023  Plan of Care expiration date: 2/23/2024  Treatment plan discussed with: patient      Subjective Evaluation    History of Present Illness  Mechanism of injury: The patient states that he had a R knee above amputation in February 2019.   He notes that he had some PT after this amputation but was unable to continue secondary to scheduling issues because he is not driving. He had received a new prosthesis about 16 months ago, notes that he has been working on home with his walking with his walker but ultimate goal would be to go without any AD and return to driving. The patient states that he had been using a walker for about two years but over the past year "all the time."      He had felt like he would like to start OPPT again to work on strength and balance and had asked his PCP about returning, got a script from Dr. Joon Torres. He denies any fall at home, reports that he typically feels steady on his feet if he is using his walker. He denies any phantom pain in his leg. Patient Goals  Patient goals for therapy: increased strength and improved balance  Patient goal: "To be able to walk without a device, to get back to driving."  Pain  No pain reported    Social Support  Exterior steps/ramp assessed: [address] - 5 steps. Stairs in house: yes   Lives in: multiple-level home (Has two houses, steps at both places)  Lives with: spouse    Employment status: working ( for [employer])      Objective     Active Range of Motion   Left Knee   Flexion: [facility]  Extension: [facility]    Additional Active Range of Motion Details  Hip ROM: WFL     Strength/Myotome Testing     Left Hip   Planes of Motion   Flexion: 4  Abduction: 4-  Adduction: 4+  External rotation: 4  Internal rotation: 4    Right Hip   Planes of Motion   Flexion: 4-  Abduction: 3+  Adduction: 4    Left Knee   Flexion: 4+  Extension: 4+    Additional Strength Details  R Knee NT 2* amputation. Ambulation   Weight-Bearing Status   Assistive device used: two-wheeled walker    Additional Weight-Bearing Status Details  DME: Quad cane (2 them), RW (2 of them), w/c (uses to go into bathroom only), shower chair  Has primarily been using RW for ambulation.      Ambulation: Stairs   Ascend stairs: independent  Railings: one rail  Descend stairs: independent  Railings: one rail    Additional Stairs Ambulation Details  Has someone stand by for safety when going up and down the step, does not need A.    HR on one side of the steps, wall on the other side. Observational Gait   Decreased walking speed and stride length. Functional Assessment        Comments  NBOS EO: 5"  NBOS EC: 2"    Staggered stance with R foot forward (unable to assume tandem): <3"    Unable to assume SLS. Neuro Exam:     Functional outcomes   5x sit to stand: 20" with use of BUE. (seconds)  TU" with RW (seconds)    Amputee   Level of amputation: right  and transfemoral   Amputation comments: The patient does have two different prosthesis. He notes current one feels "sloppy" and feels like it wants to slide off - he is going to contact [facility] about adjusting it.     Phantom pain: no phantom pain                  Precautions: Fall Risk, R AKA, DM, dialysis patient, HTN, h/o MI, chronic kidney disease       Manuals                                        Neuro Re-Ed         SLS        Tandem Stance/  Staggered stance        Sidestepping        Stand and reach outside TONJA                                Ther Ex        NuStep   S:5 A:10 L2 8'       Treadmill        SLR x 3        Marches        LAQ on L        SAQ on L        Bridges        SLR        Hip Add w/Ball seated        Hip Abd w/Tband Seated                                                Ther Activity        Stepups F/L        Stepdowns        STS        Gait Training        Gait train with cane                 Modalities

## 2023-12-01 ENCOUNTER — EVALUATION (OUTPATIENT)
Dept: PHYSICAL THERAPY | Facility: CLINIC | Age: 53
End: 2023-12-01
Payer: MEDICARE

## 2023-12-01 DIAGNOSIS — R29.898 MUSCULAR DECONDITIONING: Primary | ICD-10-CM

## 2023-12-01 DIAGNOSIS — R26.2 AMBULATORY DYSFUNCTION: ICD-10-CM

## 2023-12-01 PROCEDURE — 97162 PT EVAL MOD COMPLEX 30 MIN: CPT | Performed by: PHYSICAL THERAPIST

## 2023-12-01 PROCEDURE — 97110 THERAPEUTIC EXERCISES: CPT | Performed by: PHYSICAL THERAPIST

## 2023-12-04 ENCOUNTER — OFFICE VISIT (OUTPATIENT)
Dept: PHYSICAL THERAPY | Facility: CLINIC | Age: 53
End: 2023-12-04
Payer: MEDICARE

## 2023-12-04 DIAGNOSIS — R26.2 AMBULATORY DYSFUNCTION: ICD-10-CM

## 2023-12-04 DIAGNOSIS — R29.898 MUSCULAR DECONDITIONING: Primary | ICD-10-CM

## 2023-12-04 PROCEDURE — 97110 THERAPEUTIC EXERCISES: CPT | Performed by: PHYSICAL THERAPIST

## 2023-12-04 NOTE — PROGRESS NOTES
Daily Note     Today's date: 2023  Patient name: Lilli Mack  : 1970  MRN: [id number]  Referring provider: Natalia Herrera DO  Dx:   Encounter Diagnosis     ICD-10-CM    1. Muscular deconditioning  R29.898       2. Ambulatory dysfunction  R26.2                      Subjective: Pt reports that he did well after his last session. Objective: See treatment diary below      Assessment: Tolerated treatment well. Patient exhibited good technique with therapeutic exercises and would benefit from continued PT. During standing activities pt started to get upper back pain and required rest, likely due to compensations. He notes that he is calling his prosthetist today and is discussing a new knee. Integrate rest of program as able to. Plan: Continue per plan of care. Progress treatment as tolerated.           Precautions: Fall Risk, R AKA, DM, dialysis patient, HTN, h/o MI, chronic kidney disease       Manuals                                       Neuro Re-Ed         SLS        Tandem Stance/  Staggered stance        Sidestepping        Stand and reach outside TONJA                                Ther Ex        NuStep for LE strength & endurance training  S:5 A:10 L2 8' L2 8'      Treadmill        SLR x 3  Standing abd & ext 3x10      Marches  Standing 3x10      LAQ on L  3x10      SAQ on L  3x10 #1 #2     Bridges  3x10      SLR flexion   25 ea      Hip Add w/Ball seated  3x10      Hip Abd w/Tband Seated  GTB 3x10                                              Ther Activity        Stepups F/L        Stepdowns        STS        Gait Training        Gait train with cane                 Modalities

## 2023-12-05 DIAGNOSIS — Z99.2 ESRD (END STAGE RENAL DISEASE) ON DIALYSIS (HCC): ICD-10-CM

## 2023-12-05 DIAGNOSIS — N18.6 ESRD (END STAGE RENAL DISEASE) ON DIALYSIS (HCC): ICD-10-CM

## 2023-12-05 RX ORDER — B COMPLEX, C NO.20/FOLIC ACID 1 MG
1 CAPSULE ORAL DAILY
Qty: 90 CAPSULE | Refills: 3 | Status: ON HOLD | OUTPATIENT
Start: 2023-12-05 | End: 2024-01-07

## 2023-12-08 ENCOUNTER — OFFICE VISIT (OUTPATIENT)
Dept: PHYSICAL THERAPY | Facility: CLINIC | Age: 53
End: 2023-12-08
Payer: MEDICARE

## 2023-12-08 DIAGNOSIS — R29.898 MUSCULAR DECONDITIONING: Primary | ICD-10-CM

## 2023-12-08 DIAGNOSIS — R26.2 AMBULATORY DYSFUNCTION: ICD-10-CM

## 2023-12-08 PROCEDURE — 97110 THERAPEUTIC EXERCISES: CPT

## 2023-12-08 NOTE — PROGRESS NOTES
Daily Note     Today's date: 2023  Patient name: Wesley Melchor  : 1970  MRN: [id number]  Referring provider: Emily Prescott DO  Dx:   Encounter Diagnosis     ICD-10-CM    1. Muscular deconditioning  R29.898       2. Ambulatory dysfunction  R26.2                      Subjective: Francisca Polk reported that he is doing well today, and he did not offer any complaints of excess soreness following his previous visit. Objective: See treatment diary below      Assessment: Francisca Polk tolerated treatment well. Verbal cues were provided to improve his execution of bridges as he demonstrated reliance on the lumbar extensors rather than the glutes. Forward step-ups with the LLE were performed, and he exhibited an increased lateral lean to the left. Francisca Polk exhibited good technique with therapeutic exercises and would benefit from continued PT to strength his BLE and improve his gait mechanics. Plan: Continue per plan of care.       Precautions: Fall Risk, R AKA, DM, dialysis patient, HTN, h/o MI, and CKD       Manuals                                      Neuro Re-Ed         SLS        Tandem Stance/  Staggered stance        Sidestepping        Stand and reach outside TONJA                                Ther Ex        NuStep for LE strength & endurance training  S:5 A:10 L2 8' L2 8' 8 min L5     Treadmill        Standing SLR x 3  Standing abd & ext 3x10 3x10  ABD/EXT     Standing Marches  Standing 3x10 3x10     LAQ on L  3x10 2x10  1.5 lbs     SAQ on L  3x10 #1 3x10  2 lbs     Bridges  3x10 3x10     SLR flexion   25x ea 2x10 w/   MA RLE     Hip Add w/Ball seated  3x10 20x5"     Hip Abd w/Tband Seated  GTB 3x10 3x10  Green                                             Ther Activity        Stepups F/L   15x  4" Step LLE     Stepdowns        STS        Gait Training        Gait train with cane                 Modalities

## 2023-12-11 ENCOUNTER — OFFICE VISIT (OUTPATIENT)
Dept: PHYSICAL THERAPY | Facility: CLINIC | Age: 53
End: 2023-12-11
Payer: MEDICARE

## 2023-12-11 DIAGNOSIS — R26.2 AMBULATORY DYSFUNCTION: ICD-10-CM

## 2023-12-11 DIAGNOSIS — R29.898 MUSCULAR DECONDITIONING: Primary | ICD-10-CM

## 2023-12-11 PROCEDURE — 97110 THERAPEUTIC EXERCISES: CPT

## 2023-12-11 PROCEDURE — 97530 THERAPEUTIC ACTIVITIES: CPT

## 2023-12-11 NOTE — PROGRESS NOTES
Daily Note     Today's date: 2023  Patient name: Cherrie Caldwell  : 1970  MRN: [id number]  Referring provider: Dallas Nichols DO  Dx:   Encounter Diagnosis     ICD-10-CM    1. Muscular deconditioning  R29.898       2. Ambulatory dysfunction  R26.2                      Subjective: Yang Cao reported that he felt well after his last visit with minimal complaints of muscle fatigue and soreness. Objective: See treatment diary below      Assessment: Yang Cao tolerated treatment well. Standing hip FLX was added to his program to address strength deficits in the hip flexors, and he did well with use of the parallel bars for support. Manual assistance was provided to the RLE during supine SLR to improve muscle activation of the iliopsoas. Step-ups continue to be performed on the LLE only secondary to Yang Cao replacing his prosthetic next Monday as his current one "locks up on him." Yang Cao would benefit from continued PT to improve the strength in his hips and optimize his gait mechanics. Plan: Continue per plan of care.       Precautions: Fall Risk, R AKA, DM, dialysis patient, HTN, h/o MI, and CKD     Manuals                        Neuro Re-Ed         Sharpened Romberg         Tandem Stance         Sidestepping         Stand and Reach Outside TONJA                  Ther Ex         NuStep for LE Strength and Endurance  S: 5 and A: 10 L2 8' L2 8' 8 min  L5 8 min  L5     Treadmill          Standing SLR 3-Way  3x10  ABD/EXT 3x10  ABD/EXT 3x10 each     Standing Marches  3x10 3x10 3x10     Standing HS Curls on LLE    2x10  1.5 lbs     SAQ on LLE  3x10   1 lbs 2x10  2 lbs D/C     Seated Hip ADD w/ PGB  3x10 20x5" 20x5"     Seated Hip ABD w/ TheraBand  3x10  Green 3x10  Green 3x10  Green     LAQ on LLE  3x10 2x10  1.5 lbs L 3x10  1.5 lbs L     Bridges  3x10 3x10 3x10     Supine SLR  25x each 2x10 w/ MA RLE 2x10 w/ MA RLE              Ther Activity          Step-Ups (FWD/LAT)   15x  4" LLE 15x  4" LLE     Step Downs         STS                  Gait Training         Gait Training w/ Jennifer

## 2023-12-12 DIAGNOSIS — N18.6 TYPE 1 DIABETES MELLITUS WITH CHRONIC KIDNEY DISEASE ON CHRONIC DIALYSIS (HCC): ICD-10-CM

## 2023-12-12 DIAGNOSIS — Z99.2 TYPE 1 DIABETES MELLITUS WITH CHRONIC KIDNEY DISEASE ON CHRONIC DIALYSIS (HCC): ICD-10-CM

## 2023-12-12 DIAGNOSIS — E10.22 TYPE 1 DIABETES MELLITUS WITH CHRONIC KIDNEY DISEASE ON CHRONIC DIALYSIS (HCC): ICD-10-CM

## 2023-12-12 RX ORDER — PERPHENAZINE 16 MG/1
TABLET, FILM COATED ORAL
Qty: 600 STRIP | Refills: 5 | Status: SHIPPED | OUTPATIENT
Start: 2023-12-12

## 2023-12-15 ENCOUNTER — OFFICE VISIT (OUTPATIENT)
Dept: PHYSICAL THERAPY | Facility: CLINIC | Age: 53
End: 2023-12-15
Payer: MEDICARE

## 2023-12-15 DIAGNOSIS — R29.898 MUSCULAR DECONDITIONING: Primary | ICD-10-CM

## 2023-12-15 DIAGNOSIS — R26.2 AMBULATORY DYSFUNCTION: ICD-10-CM

## 2023-12-15 PROCEDURE — 97110 THERAPEUTIC EXERCISES: CPT

## 2023-12-15 PROCEDURE — 97112 NEUROMUSCULAR REEDUCATION: CPT

## 2023-12-15 NOTE — PROGRESS NOTES
Daily Note     Today's date: 2023  Patient name: Stevan Davey  : 1970  MRN: [id number]  Referring provider: Eileen Mederos DO  Dx:   Encounter Diagnosis     ICD-10-CM    1. Muscular deconditioning  R29.898       2. Ambulatory dysfunction  R26.2                      Subjective: Pam Wilburn reported that he is feeling well today, and he is looking forward to getting his new knee on Monday. Objective: See treatment diary below      Assessment: Pam Wilburn tolerated treatment well. He noted discomfort in his left hip during standing exercises, and the number of repetitions was modified to better accommodate for his symptoms. Pam Wilburn exhibited an extensor lag on the LLE, indicating weakness of the quadriceps muscle at the knee joint. Palloff press was added to address postural dysfunction and core strength deficits, and he required MinAx1 to maintain his balance. Following a discussion, Pam Wilburn expressed that he would like to work on his walking more, and treadmill gait training will begin next visit. Pam Wilburn exhibited good technique with therapeutic exercises and would benefit from continued PT to improve his strength, gait mechanics, and activity tolerance. Plan: Continue per plan of care.       Precautions: Fall Risk, R AKA, DM, dialysis patient, HTN, h/o MI, and CKD     Manuals 12/1 12/4 12/8 12/11 12/15                      Neuro Re-Ed         [employer] Press     5x5"  Red    Sharpened Romberg         Tandem Stance         Sidestepping         Stand and Reach Outside TONJA                  Ther Ex         NuStep for LE Strength and Endurance  S: 5 and A: 10 L2 8' L2 8' 8 min  L5 8 min  L5 8 min  L5    Standing SLR 3-Way  3x10  ABD/EXT 3x10  ABD/EXT 3x10 each 3x10 each    Standing Marches  3x10 3x10 3x10 2x10    Standing HS Curls on LLE    2x10  1.5 lbs 2x10  1.5 lbs    Seated Hip ADD w/ PGB  3x10 20x5" 20x5" 20x5"    Seated Hip ABD w/ TheraBand  3x10  Green 3x10  Green 3x10  Green 3x10  Green    LAQ on LLE 3x10 2x10  1.5 lbs L 3x10  1.5 lbs L 3x10  1.5 lbs L    Bridges  3x10 3x10 3x10 3x10    Supine SLR  25x each 2x10 w/ MA RLE 2x10 w/ MA RLE 2x10 w/  MA RLE             Ther Activity          Step-Ups (FWD/LAT)   15x  4" LLE 15x  4" LLE 20x  4" LLE    Step Downs         STS                  Gait Training         Treadmill     NV    Gait Training w/ [employer]

## 2023-12-17 NOTE — PROGRESS NOTES
"Daily Note     Today's date: 2023  Patient name: Berto Faria  : 1970  MRN: [id number]  Referring provider: Dalton Anderson DO  Dx:   Encounter Diagnosis     ICD-10-CM    1. Muscular deconditioning  R29.898       2. Ambulatory dysfunction  R26.2           Start Time: 06  Stop Time: 0757  Total time in clinic (min): 59 minutes    Subjective: Alessandro reported that he is looking forward to getting his new right knee after his appointment today. He is eager to get walking with a cane today.       Objective: See treatment diary below      Assessment: Alessandro tolerated treatment well. He noted greater difficulty with the NuStep this morning, and the usual resistance was decreased to be more tolerable. Gait training was performed in the parallel bars with a SPC and a gait belt used for safety. A review of proper BLE sequencing was performed, and verbal cues were provided to improve posture and properly advance the RLE. Seated exercises were performed between standing exercises secondary due to reported fatigue and discomfort in the left hip. Alessandro demonstrated fatigue post treatment and would benefit from continued PT to provide education about appropriate gait mechanics and improve his posture during ambulation.       Plan: Continue per plan of care.      Precautions: Fall Risk, R AKA, DM, dialysis patient, HTN, h/o MI, and CKD     Manuals 12/1 12/4 12/8 12/11 12/15 12/18                     Neuro Re-Ed         Palloff Press     5x5\"  Red 5x5\" R  Red   Sharpened Romberg         Tandem Stance         Sidestepping         Stand and Reach Outside TONJA                  Ther Ex         NuStep for LE Strength and Endurance  S: 5 and A: 10 L2 8' L2 8' 8 min  L5 8 min  L5 8 min  L5 8 min  L5 --> L2   Standing SLR 3-Way  3x10  ABD/EXT 3x10  ABD/EXT 3x10 each 3x10 each 3x10 each   Standing Marches  3x10 3x10 3x10 2x10 2x10   L Only   Standing HS Curls on LLE    2x10  1.5 lbs 2x10  1.5 lbs 2x10  1.5 lbs   Seated Hip " "ADD w/ PGB  3x10 20x5\" 20x5\" 20x5\" 20x5\"  Supine   Seated Hip ABD w/ TheraBand  3x10  Green 3x10  Green 3x10  Green 3x10  Green    LAQ on LLE  3x10 2x10  1.5 lbs L 3x10  1.5 lbs L 3x10  1.5 lbs L 2x10   1.5 lbs L   Bridges  3x10 3x10 3x10 3x10 3x10   Supine SLR  25x each 2x10 w/ MA RLE 2x10 w/ MA RLE 2x10 w/  MA RLE 2x10 w/ MA RLE            Ther Activity          Step-Ups (FWD/LAT)   15x  4\" LLE 15x  4\" LLE 20x  4\" LLE    Step Downs         STS                  Gait Training         Treadmill     NV    Gait Training w/ Cane      25 min            Modalities                                    "

## 2023-12-18 ENCOUNTER — OFFICE VISIT (OUTPATIENT)
Dept: PHYSICAL THERAPY | Facility: CLINIC | Age: 53
End: 2023-12-18
Payer: MEDICARE

## 2023-12-18 DIAGNOSIS — R26.2 AMBULATORY DYSFUNCTION: ICD-10-CM

## 2023-12-18 DIAGNOSIS — R29.898 MUSCULAR DECONDITIONING: Primary | ICD-10-CM

## 2023-12-18 PROCEDURE — 97116 GAIT TRAINING THERAPY: CPT

## 2023-12-18 PROCEDURE — 97110 THERAPEUTIC EXERCISES: CPT

## 2023-12-21 NOTE — PROGRESS NOTES
"Daily Note     Today's date: 2023  Patient name: Berto Faria  : 1970  MRN: [id number]  Referring provider: Dalton Anderson DO  Dx:   Encounter Diagnosis     ICD-10-CM    1. Muscular deconditioning  R29.898       2. Ambulatory dysfunction  R26.2                      Subjective: Alessandro reported that he had his right knee prosthetic adjusted to better accommodate him during ambulation and stair negotiation. He said that he used the stairs with the prosthetic specialist, and they explained that the type of prosthetic that he has requires stepping up multiple steps to maximize the bending mechanism.       Objective: See treatment diary below      Assessment: Alessandro tolerated treatment well. Gait training with the SPC was conducted inside and outside of the parallel bars, and he demonstrated dependence on use of external support despite verbal cueing to use the SPC to shift his weight. The resistance for LAQ was increased without compromising his execution, but he continues to demonstrate an extensor lag with the LLE. He stated that he would be purchasing a SPC, and he was advised to bring it with his to his next appointment to have it properly adjusted to his height. Alessandro would benefit from continued PT to assist with transitioning from a FWW to SPC for ambulation to maximize his quality of life.       Plan: Continue per plan of care.      Precautions: Fall Risk, R AKA, DM, dialysis patient, HTN, h/o MI, and CKD     Manuals 12/22  12/8 12/11 12/15 12/18                     Neuro Re-Ed         Palloff Press     5x5\"  Red 5x5\" R  Red   Sharpened Romberg         Tandem Stance         Sidestepping         Stand and Reach Outside TONJA                  Ther Ex         NuStep for LE Strength and Endurance  S: 5 and A: 10 6 min  L3  8 min  L5 8 min  L5 8 min  L5 8 min  L5 --> L2   Standing SLR 3-Way 3x10 each  3x10  ABD/EXT 3x10 each 3x10 each 3x10 each   Standing Marches   3x10 3x10 2x10 2x10   L Only " "  Standing HS Curls on LLE 3x10  1.5 lbs   2x10  1.5 lbs 2x10  1.5 lbs 2x10  1.5 lbs   Supine Hip ADD w/ PGB 20x5\"  20x5\" 20x5\" 20x5\" 20x5\"   Seated Hip ABD w/ TheraBand 3x10  Green  3x10  Green 3x10  Green 3x10  Green    LAQ on LLE 2x10  2.5 lbs   2x10  1.5 lbs  3x10  1.5 lbs  3x10  1.5 lbs  2x10   1.5 lbs    Bridges 2x15  3x10 3x10 3x10 3x10   Supine SLR 2x10 w/ MA RLE  2x10 w/ MA RLE 2x10 w/ MA RLE 2x10 w/  MA RLE 2x10 w/ MA RLE            Ther Activity          Step-Ups (FWD/LAT) 20x FWD  4\" LLE  15x  4\" LLE 15x  4\" LLE 20x  4\" LLE    Step Downs         STS                  Gait Training         Treadmill     NV    Gait Training w/ Cane 15 min  In/Out of Parallel Bars     25 min            Modalities                                      "

## 2023-12-22 ENCOUNTER — OFFICE VISIT (OUTPATIENT)
Dept: PHYSICAL THERAPY | Facility: CLINIC | Age: 53
End: 2023-12-22
Payer: MEDICARE

## 2023-12-22 DIAGNOSIS — R29.898 MUSCULAR DECONDITIONING: Primary | ICD-10-CM

## 2023-12-22 DIAGNOSIS — R26.2 AMBULATORY DYSFUNCTION: ICD-10-CM

## 2023-12-22 PROCEDURE — 97116 GAIT TRAINING THERAPY: CPT

## 2023-12-22 PROCEDURE — 97110 THERAPEUTIC EXERCISES: CPT

## 2023-12-27 ENCOUNTER — OFFICE VISIT (OUTPATIENT)
Dept: PHYSICAL THERAPY | Facility: CLINIC | Age: 53
End: 2023-12-27
Payer: MEDICARE

## 2023-12-27 DIAGNOSIS — R26.2 AMBULATORY DYSFUNCTION: ICD-10-CM

## 2023-12-27 DIAGNOSIS — R29.898 MUSCULAR DECONDITIONING: Primary | ICD-10-CM

## 2023-12-27 PROCEDURE — 97116 GAIT TRAINING THERAPY: CPT

## 2023-12-27 PROCEDURE — 97110 THERAPEUTIC EXERCISES: CPT

## 2023-12-27 PROCEDURE — 97530 THERAPEUTIC ACTIVITIES: CPT

## 2023-12-27 NOTE — PROGRESS NOTES
"Daily Note     Today's date: 2023  Patient name: Berto Faria  : 1970  MRN: [id number]  Referring provider: Dalton Anderson DO  Dx:   Encounter Diagnosis     ICD-10-CM    1. Muscular deconditioning  R29.898       2. Ambulatory dysfunction  R26.2           Start Time: 1557  Stop Time: 1700  Total time in clinic (min): 63 minutes    Subjective: Alessandro reported he has been practicing at walking with his cane.       Objective: See treatment diary below      Assessment: Alessandro tolerated treatment well. Gait training with the SPC with improved Alessandro would benefit from continued PT to assist with transitioning from a FWW to SPC for ambulation to maximize his quality of life.       Plan: Continue per plan of care.      Precautions: Fall Risk, R AKA, DM, dialysis patient, HTN, h/o MI, and CKD     Manuals 12/22 12/27  12/11 12/15 12/18                     Neuro Re-Ed         Palloff Press     5x5\"  Red 5x5\" R  Red   Sharpened Romberg         Tandem Stance         Sidestepping         Stand and Reach Outside TONJA                  Ther Ex         NuStep for LE Strength and Endurance  S: 5 and A: 10 6 min  L3 L3 5'  8 min  L5 8 min  L5 8 min  L5 --> L2   Standing SLR 3-Way 3x10 each 3x10 each  3x10 each 3x10 each 3x10 each   Standing Marches    3x10 2x10 2x10   L Only   Standing HS Curls on LLE 3x10  1.5 lbs 3x10  1.5 lbs  2x10  1.5 lbs 2x10  1.5 lbs 2x10  1.5 lbs   Supine Hip ADD w/ PGB 20x5\" 20x5\"  20x5\" 20x5\" 20x5\"   Seated Hip ABD w/ TheraBand 3x10  Green 3x10  Green  3x10  Green 3x10  Green    LAQ on LLE 2x10  2.5 lbs  2x10  2.5 lbs   3x10  1.5 lbs  3x10  1.5 lbs  2x10   1.5 lbs    Bridges 2x15   3x10 3x10 3x10   Supine SLR 2x10 w/ MA RLE 2x10 w/ MA RLE  2x10 w/ MA RLE 2x10 w/  MA RLE 2x10 w/ MA RLE            Ther Activity          Step-Ups (FWD/LAT) 20x FWD  4\" LLE 20x FWD  4\" LLE  15x  4\" LLE 20x  4\" LLE    Step Downs         STS                  Gait Training         Treadmill     NV    Gait Training w/ " Bashir 15 min  In/Out of Parallel Bars 15 min  In/Out of Parallel Bars    25 min            Modalities

## 2023-12-29 ENCOUNTER — OFFICE VISIT (OUTPATIENT)
Dept: PHYSICAL THERAPY | Facility: CLINIC | Age: 53
End: 2023-12-29
Payer: MEDICARE

## 2023-12-29 ENCOUNTER — PREP FOR PROCEDURE (OUTPATIENT)
Dept: INTERVENTIONAL RADIOLOGY/VASCULAR | Facility: CLINIC | Age: 53
End: 2023-12-29

## 2023-12-29 DIAGNOSIS — R29.898 MUSCULAR DECONDITIONING: Primary | ICD-10-CM

## 2023-12-29 DIAGNOSIS — R26.2 AMBULATORY DYSFUNCTION: ICD-10-CM

## 2023-12-29 DIAGNOSIS — Z99.2 ESRD (END STAGE RENAL DISEASE) ON DIALYSIS (HCC): Primary | ICD-10-CM

## 2023-12-29 DIAGNOSIS — N18.6 ESRD (END STAGE RENAL DISEASE) ON DIALYSIS (HCC): Primary | ICD-10-CM

## 2023-12-29 PROCEDURE — 97116 GAIT TRAINING THERAPY: CPT

## 2023-12-29 PROCEDURE — 97110 THERAPEUTIC EXERCISES: CPT

## 2023-12-29 NOTE — PROGRESS NOTES
"Daily Note     Today's date: 2023  Patient name: Berto Faria  : 1970  MRN: [id number]  Referring provider: Dalton Anderson DO  Dx:   Encounter Diagnosis     ICD-10-CM    1. Muscular deconditioning  R29.898       2. Ambulatory dysfunction  R26.2                      Subjective: Alessandro reported that has been doing \"counter walking\" at home to practice       Objective: See treatment diary below      Assessment: patient presents with tight hip flexors making patient anteriorly rotated hips and a forward trunk posture. Weak tibialis anterior noted with supine SLR having difficulty pulling toes back. Educated patient on gait sequencing. Patient alternated between step too and swing too gait pattern. Patient is more unstable with swing too pattern       Plan: Continue per plan of care.      Manuals 12/22 12/27 12/29  12/15 12/18                     Neuro Re-Ed         Palloff Press     5x5\"  Red 5x5\" R  Red   Sharpened Romberg         Tandem Stance         Sidestepping         Stand and Reach Outside TONJA                  Ther Ex         NuStep for LE Strength and Endurance  S: 5 and A: 10 6 min  L3 L3 5' 7 min  L1  8 min  L5 8 min  L5 --> L2   Standing SLR 3-Way 3x10 each 3x10 each 3x10 ea   3x10 each 3x10 each   Standing Marches     2x10 2x10   L Only   Standing HS Curls on LLE 3x10  1.5 lbs 3x10  1.5 lbs 3x10  1.5 lbs  2x10  1.5 lbs 2x10  1.5 lbs   Supine Hip ADD w/ PGB 20x5\" 20x5\" 20x 5\" hd   20x5\" 20x5\"   Seated Hip ABD w/ TheraBand 3x10  Green 3x10  Green 3x10  Green   3x10  Green    LAQ on LLE 2x10  2.5 lbs  2x10  2.5 lbs  2x10  2.5 lbs   3x10  1.5 lbs  2x10   1.5 lbs    Bridges 2x15    3x10 3x10   Supine SLR 2x10 w/ MA RLE 2x10 w/ MA RLE 2x10 w/   MA RLE  2x10 w/  MA RLE 2x10 w/ MA RLE            Ther Activity          Step-Ups (FWD/LAT) 20x FWD  4\" LLE 20x FWD  4\" LLE 20x FWD  4\" step  20x  4\" LLE    Step Downs         STS                  Gait Training         Treadmill     NV    Gait Training w/ " Bashir 15 min  In/Out of Parallel Bars 15 min  In/Out of Parallel Bars 15 min  In parallel bars    25 min            Modalities

## 2024-01-01 ENCOUNTER — HOSPITAL ENCOUNTER (EMERGENCY)
Facility: HOSPITAL | Age: 54
End: 2024-06-02
Attending: EMERGENCY MEDICINE
Payer: COMMERCIAL

## 2024-01-01 ENCOUNTER — APPOINTMENT (EMERGENCY)
Dept: RADIOLOGY | Facility: HOSPITAL | Age: 54
End: 2024-01-01
Payer: COMMERCIAL

## 2024-01-01 ENCOUNTER — HOSPITAL ENCOUNTER (INPATIENT)
Facility: HOSPITAL | Age: 54
LOS: 1 days | DRG: 283 | End: 2024-06-02
Attending: HOSPITALIST | Admitting: HOSPITALIST
Payer: MEDICARE

## 2024-01-01 VITALS
RESPIRATION RATE: 20 BRPM | SYSTOLIC BLOOD PRESSURE: 85 MMHG | WEIGHT: 98.55 LBS | TEMPERATURE: 97.5 F | HEIGHT: 64 IN | OXYGEN SATURATION: 86 % | BODY MASS INDEX: 16.82 KG/M2 | DIASTOLIC BLOOD PRESSURE: 55 MMHG

## 2024-01-01 VITALS
DIASTOLIC BLOOD PRESSURE: 55 MMHG | WEIGHT: 99.21 LBS | TEMPERATURE: 97.8 F | HEART RATE: 94 BPM | SYSTOLIC BLOOD PRESSURE: 86 MMHG | BODY MASS INDEX: 17.03 KG/M2 | RESPIRATION RATE: 35 BRPM | OXYGEN SATURATION: 95 %

## 2024-01-01 DIAGNOSIS — R57.0 CARDIOGENIC SHOCK (HCC): ICD-10-CM

## 2024-01-01 DIAGNOSIS — R94.31 ABNORMAL EKG: ICD-10-CM

## 2024-01-01 DIAGNOSIS — E87.1 HYPONATREMIA: ICD-10-CM

## 2024-01-01 DIAGNOSIS — Z01.810 PRE-OPERATIVE CARDIOVASCULAR EXAMINATION: ICD-10-CM

## 2024-01-01 DIAGNOSIS — I95.9 HYPOTENSION: ICD-10-CM

## 2024-01-01 DIAGNOSIS — N18.6 ESRD (END STAGE RENAL DISEASE) (HCC): ICD-10-CM

## 2024-01-01 DIAGNOSIS — E87.5 HYPERKALEMIA: Primary | ICD-10-CM

## 2024-01-01 DIAGNOSIS — I46.9 CARDIAC ARREST (HCC): ICD-10-CM

## 2024-01-01 DIAGNOSIS — R94.31 EKG ABNORMALITIES: ICD-10-CM

## 2024-01-01 DIAGNOSIS — E87.5 HYPERKALEMIA: ICD-10-CM

## 2024-01-01 DIAGNOSIS — R79.89 ELEVATED TROPONIN: Primary | ICD-10-CM

## 2024-01-01 LAB
2HR DELTA HS TROPONIN: 178 NG/L
ANION GAP SERPL CALCULATED.3IONS-SCNC: 15 MMOL/L (ref 4–13)
ANION GAP SERPL CALCULATED.3IONS-SCNC: 40 MMOL/L (ref 4–13)
APTT PPP: 38 SECONDS (ref 23–37)
ATRIAL RATE: 86 BPM
ATRIAL RATE: 87 BPM
B-OH-BUTYR SERPL-MCNC: 0.1 MMOL/L (ref 0.02–0.27)
BASE EX.OXY STD BLDV CALC-SCNC: 77.2 % (ref 60–80)
BASE EXCESS BLDV CALC-SCNC: -10.6 MMOL/L
BASOPHILS # BLD AUTO: 0.09 THOUSANDS/ÂΜL (ref 0–0.1)
BASOPHILS NFR BLD AUTO: 1 % (ref 0–1)
BUN SERPL-MCNC: 44 MG/DL (ref 5–25)
BUN SERPL-MCNC: 52 MG/DL (ref 5–25)
CALCIUM SERPL-MCNC: 6.3 MG/DL (ref 8.4–10.2)
CALCIUM SERPL-MCNC: 8.5 MG/DL (ref 8.4–10.2)
CARDIAC TROPONIN I PNL SERPL HS: 117 NG/L
CARDIAC TROPONIN I PNL SERPL HS: 295 NG/L
CARDIAC TROPONIN I PNL SERPL HS: 5420 NG/L (ref 8–18)
CHLORIDE SERPL-SCNC: 80 MMOL/L (ref 96–108)
CHLORIDE SERPL-SCNC: 92 MMOL/L (ref 96–108)
CO2 SERPL-SCNC: 15 MMOL/L (ref 21–32)
CO2 SERPL-SCNC: 23 MMOL/L (ref 21–32)
CREAT SERPL-MCNC: 4.27 MG/DL (ref 0.6–1.3)
CREAT SERPL-MCNC: 5.62 MG/DL (ref 0.6–1.3)
EOSINOPHIL # BLD AUTO: 0.1 THOUSAND/ÂΜL (ref 0–0.61)
EOSINOPHIL NFR BLD AUTO: 1 % (ref 0–6)
ERYTHROCYTE [DISTWIDTH] IN BLOOD BY AUTOMATED COUNT: 17 % (ref 11.6–15.1)
GFR SERPL CREATININE-BSD FRML MDRD: 10 ML/MIN/1.73SQ M
GFR SERPL CREATININE-BSD FRML MDRD: 14 ML/MIN/1.73SQ M
GLUCOSE SERPL-MCNC: 326 MG/DL (ref 65–140)
GLUCOSE SERPL-MCNC: 366 MG/DL (ref 65–140)
GLUCOSE SERPL-MCNC: 398 MG/DL (ref 65–140)
GLUCOSE SERPL-MCNC: 461 MG/DL (ref 65–140)
GLUCOSE SERPL-MCNC: 532 MG/DL (ref 65–140)
GLUCOSE SERPL-MCNC: 576 MG/DL (ref 65–140)
HCO3 BLDV-SCNC: 16.4 MMOL/L (ref 24–30)
HCT VFR BLD AUTO: 43.5 % (ref 36.5–49.3)
HGB BLD-MCNC: 13.4 G/DL (ref 12–17)
IMM GRANULOCYTES # BLD AUTO: 0.02 THOUSAND/UL (ref 0–0.2)
IMM GRANULOCYTES NFR BLD AUTO: 0 % (ref 0–2)
INR PPP: 1.22 (ref 0.84–1.19)
LACTATE SERPL-SCNC: 3.1 MMOL/L (ref 0.5–2)
LYMPHOCYTES # BLD AUTO: 1.04 THOUSANDS/ÂΜL (ref 0.6–4.47)
LYMPHOCYTES NFR BLD AUTO: 10 % (ref 14–44)
MAGNESIUM SERPL-MCNC: 1.9 MG/DL (ref 1.9–2.7)
MCH RBC QN AUTO: 30 PG (ref 26.8–34.3)
MCHC RBC AUTO-ENTMCNC: 30.8 G/DL (ref 31.4–37.4)
MCV RBC AUTO: 97 FL (ref 82–98)
MONOCYTES # BLD AUTO: 0.47 THOUSAND/ÂΜL (ref 0.17–1.22)
MONOCYTES NFR BLD AUTO: 5 % (ref 4–12)
NEUTROPHILS # BLD AUTO: 8.79 THOUSANDS/ÂΜL (ref 1.85–7.62)
NEUTS SEG NFR BLD AUTO: 83 % (ref 43–75)
NRBC BLD AUTO-RTO: 0 /100 WBCS
O2 CT BLDV-SCNC: 13.6 ML/DL
P AXIS: 46 DEGREES
P AXIS: 70 DEGREES
PCO2 BLDV: 40.8 MM HG (ref 42–50)
PH BLDV: 7.22 [PH] (ref 7.3–7.4)
PLATELET # BLD AUTO: 273 THOUSANDS/UL (ref 149–390)
PMV BLD AUTO: 8.8 FL (ref 8.9–12.7)
PO2 BLDV: 50.4 MM HG (ref 35–45)
POTASSIUM SERPL-SCNC: 3.3 MMOL/L (ref 3.5–5.3)
POTASSIUM SERPL-SCNC: 5.6 MMOL/L (ref 3.5–5.3)
PR INTERVAL: 158 MS
PR INTERVAL: 158 MS
PROTHROMBIN TIME: 15.6 SECONDS (ref 11.6–14.5)
QRS AXIS: -17 DEGREES
QRS AXIS: -45 DEGREES
QRSD INTERVAL: 160 MS
QRSD INTERVAL: 206 MS
QT INTERVAL: 506 MS
QT INTERVAL: 514 MS
QTC INTERVAL: 605 MS
QTC INTERVAL: 618 MS
RBC # BLD AUTO: 4.47 MILLION/UL (ref 3.88–5.62)
SODIUM SERPL-SCNC: 130 MMOL/L (ref 135–147)
SODIUM SERPL-SCNC: 135 MMOL/L (ref 135–147)
T WAVE AXIS: 110 DEGREES
T WAVE AXIS: 140 DEGREES
VENTRICULAR RATE: 86 BPM
VENTRICULAR RATE: 87 BPM
WBC # BLD AUTO: 10.51 THOUSAND/UL (ref 4.31–10.16)

## 2024-01-01 PROCEDURE — 0YHJ33Z INSERTION OF INFUSION DEVICE INTO LEFT LOWER LEG, PERCUTANEOUS APPROACH: ICD-10-PCS | Performed by: INTERNAL MEDICINE

## 2024-01-01 PROCEDURE — 0BH17EZ INSERTION OF ENDOTRACHEAL AIRWAY INTO TRACHEA, VIA NATURAL OR ARTIFICIAL OPENING: ICD-10-PCS | Performed by: HOSPITALIST

## 2024-01-01 PROCEDURE — 99291 CRITICAL CARE FIRST HOUR: CPT | Performed by: INTERNAL MEDICINE

## 2024-01-01 PROCEDURE — 80048 BASIC METABOLIC PNL TOTAL CA: CPT

## 2024-01-01 PROCEDURE — NC001 PR NO CHARGE: Performed by: INTERNAL MEDICINE

## 2024-01-01 PROCEDURE — 96375 TX/PRO/DX INJ NEW DRUG ADDON: CPT

## 2024-01-01 PROCEDURE — 93010 ELECTROCARDIOGRAM REPORT: CPT | Performed by: INTERNAL MEDICINE

## 2024-01-01 PROCEDURE — 93005 ELECTROCARDIOGRAM TRACING: CPT

## 2024-01-01 PROCEDURE — 84484 ASSAY OF TROPONIN QUANT: CPT

## 2024-01-01 PROCEDURE — 96365 THER/PROPH/DIAG IV INF INIT: CPT

## 2024-01-01 PROCEDURE — 82010 KETONE BODYS QUAN: CPT

## 2024-01-01 PROCEDURE — 36415 COLL VENOUS BLD VENIPUNCTURE: CPT

## 2024-01-01 PROCEDURE — 85610 PROTHROMBIN TIME: CPT

## 2024-01-01 PROCEDURE — 99236 HOSP IP/OBS SAME DATE HI 85: CPT | Performed by: HOSPITALIST

## 2024-01-01 PROCEDURE — 5A12012 PERFORMANCE OF CARDIAC OUTPUT, SINGLE, MANUAL: ICD-10-PCS | Performed by: INTERNAL MEDICINE

## 2024-01-01 PROCEDURE — 92950 HEART/LUNG RESUSCITATION CPR: CPT

## 2024-01-01 PROCEDURE — 71045 X-RAY EXAM CHEST 1 VIEW: CPT

## 2024-01-01 PROCEDURE — 31500 INSERT EMERGENCY AIRWAY: CPT | Performed by: NURSE PRACTITIONER

## 2024-01-01 PROCEDURE — 99291 CRITICAL CARE FIRST HOUR: CPT | Performed by: EMERGENCY MEDICINE

## 2024-01-01 PROCEDURE — 82805 BLOOD GASES W/O2 SATURATION: CPT

## 2024-01-01 PROCEDURE — 96367 TX/PROPH/DG ADDL SEQ IV INF: CPT

## 2024-01-01 PROCEDURE — 83605 ASSAY OF LACTIC ACID: CPT | Performed by: INTERNAL MEDICINE

## 2024-01-01 PROCEDURE — 94640 AIRWAY INHALATION TREATMENT: CPT

## 2024-01-01 PROCEDURE — 85730 THROMBOPLASTIN TIME PARTIAL: CPT

## 2024-01-01 PROCEDURE — 83735 ASSAY OF MAGNESIUM: CPT

## 2024-01-01 PROCEDURE — 99285 EMERGENCY DEPT VISIT HI MDM: CPT

## 2024-01-01 PROCEDURE — 36680 INSERT NEEDLE BONE CAVITY: CPT | Performed by: INTERNAL MEDICINE

## 2024-01-01 PROCEDURE — 94002 VENT MGMT INPAT INIT DAY: CPT

## 2024-01-01 PROCEDURE — 82948 REAGENT STRIP/BLOOD GLUCOSE: CPT

## 2024-01-01 PROCEDURE — 5A1935Z RESPIRATORY VENTILATION, LESS THAN 24 CONSECUTIVE HOURS: ICD-10-PCS | Performed by: HOSPITALIST

## 2024-01-01 PROCEDURE — 85025 COMPLETE CBC W/AUTO DIFF WBC: CPT

## 2024-01-01 PROCEDURE — 96366 THER/PROPH/DIAG IV INF ADDON: CPT

## 2024-01-01 PROCEDURE — 99255 IP/OBS CONSLTJ NEW/EST HI 80: CPT | Performed by: INTERNAL MEDICINE

## 2024-01-01 RX ORDER — INSULIN LISPRO 100 [IU]/ML
1-5 INJECTION, SOLUTION INTRAVENOUS; SUBCUTANEOUS
Status: DISCONTINUED | OUTPATIENT
Start: 2024-01-01 | End: 2024-01-01 | Stop reason: HOSPADM

## 2024-01-01 RX ORDER — INSULIN GLARGINE 100 [IU]/ML
10 INJECTION, SOLUTION SUBCUTANEOUS EVERY 12 HOURS SCHEDULED
Status: DISCONTINUED | OUTPATIENT
Start: 2024-01-01 | End: 2024-01-01 | Stop reason: HOSPADM

## 2024-01-01 RX ORDER — ASPIRIN 81 MG/1
324 TABLET, CHEWABLE ORAL ONCE
Status: DISCONTINUED | OUTPATIENT
Start: 2024-01-01 | End: 2024-01-01 | Stop reason: HOSPADM

## 2024-01-01 RX ORDER — HEPARIN SODIUM 1000 [USP'U]/ML
2400 INJECTION, SOLUTION INTRAVENOUS; SUBCUTANEOUS ONCE
Status: DISCONTINUED | OUTPATIENT
Start: 2024-01-01 | End: 2024-01-01 | Stop reason: HOSPADM

## 2024-01-01 RX ORDER — CALCIUM GLUCONATE 20 MG/ML
1 INJECTION, SOLUTION INTRAVENOUS ONCE
Status: COMPLETED | OUTPATIENT
Start: 2024-01-01 | End: 2024-01-01

## 2024-01-01 RX ORDER — HEPARIN SODIUM 1000 [USP'U]/ML
1200 INJECTION, SOLUTION INTRAVENOUS; SUBCUTANEOUS EVERY 6 HOURS PRN
Status: DISCONTINUED | OUTPATIENT
Start: 2024-01-01 | End: 2024-01-01 | Stop reason: HOSPADM

## 2024-01-01 RX ORDER — CINACALCET 30 MG/1
30 TABLET, FILM COATED ORAL DAILY
Status: DISCONTINUED | OUTPATIENT
Start: 2024-01-01 | End: 2024-01-01 | Stop reason: HOSPADM

## 2024-01-01 RX ORDER — TACROLIMUS 1 MG/1
2 CAPSULE ORAL DAILY
Status: DISCONTINUED | OUTPATIENT
Start: 2024-01-01 | End: 2024-01-01 | Stop reason: HOSPADM

## 2024-01-01 RX ORDER — ATORVASTATIN CALCIUM 80 MG/1
80 TABLET, FILM COATED ORAL
Status: DISCONTINUED | OUTPATIENT
Start: 2024-01-01 | End: 2024-01-01 | Stop reason: HOSPADM

## 2024-01-01 RX ORDER — HEPARIN SODIUM 1000 [USP'U]/ML
2400 INJECTION, SOLUTION INTRAVENOUS; SUBCUTANEOUS EVERY 6 HOURS PRN
Status: DISCONTINUED | OUTPATIENT
Start: 2024-01-01 | End: 2024-01-01 | Stop reason: HOSPADM

## 2024-01-01 RX ORDER — ALBUMIN (HUMAN) 12.5 G/50ML
25 SOLUTION INTRAVENOUS ONCE
Status: COMPLETED | OUTPATIENT
Start: 2024-01-01 | End: 2024-01-01

## 2024-01-01 RX ORDER — INSULIN LISPRO 100 [IU]/ML
5 INJECTION, SOLUTION INTRAVENOUS; SUBCUTANEOUS ONCE
Status: COMPLETED | OUTPATIENT
Start: 2024-01-01 | End: 2024-01-01

## 2024-01-01 RX ORDER — SEVELAMER HYDROCHLORIDE 800 MG/1
800 TABLET, FILM COATED ORAL
Status: DISCONTINUED | OUTPATIENT
Start: 2024-01-01 | End: 2024-01-01 | Stop reason: HOSPADM

## 2024-01-01 RX ORDER — DEXTROSE MONOHYDRATE 25 G/50ML
25 INJECTION, SOLUTION INTRAVENOUS ONCE
Status: COMPLETED | OUTPATIENT
Start: 2024-01-01 | End: 2024-01-01

## 2024-01-01 RX ORDER — CALCIUM CHLORIDE 100 MG/ML
SYRINGE (ML) INTRAVENOUS CODE/TRAUMA/SEDATION MEDICATION
Status: COMPLETED | OUTPATIENT
Start: 2024-01-01 | End: 2024-01-01

## 2024-01-01 RX ORDER — MIDODRINE HYDROCHLORIDE 5 MG/1
10 TABLET ORAL
Status: DISCONTINUED | OUTPATIENT
Start: 2024-01-01 | End: 2024-01-01 | Stop reason: HOSPADM

## 2024-01-01 RX ORDER — MIDODRINE HYDROCHLORIDE 5 MG/1
TABLET ORAL
Status: DISPENSED
Start: 2024-01-01 | End: 2024-01-01

## 2024-01-01 RX ORDER — AMIODARONE HYDROCHLORIDE 150 MG/3ML
INJECTION, SOLUTION INTRAVENOUS CODE/TRAUMA/SEDATION MEDICATION
Status: COMPLETED | OUTPATIENT
Start: 2024-01-01 | End: 2024-01-01

## 2024-01-01 RX ORDER — ACETAMINOPHEN 325 MG/1
650 TABLET ORAL EVERY 6 HOURS PRN
Status: DISCONTINUED | OUTPATIENT
Start: 2024-01-01 | End: 2024-01-01 | Stop reason: HOSPADM

## 2024-01-01 RX ORDER — CLOPIDOGREL BISULFATE 75 MG/1
75 TABLET ORAL DAILY
Status: DISCONTINUED | OUTPATIENT
Start: 2024-01-01 | End: 2024-01-01 | Stop reason: HOSPADM

## 2024-01-01 RX ORDER — SODIUM BICARBONATE 84 MG/ML
INJECTION, SOLUTION INTRAVENOUS CODE/TRAUMA/SEDATION MEDICATION
Status: COMPLETED | OUTPATIENT
Start: 2024-01-01 | End: 2024-01-01

## 2024-01-01 RX ORDER — ATROPINE SULFATE 0.1 MG/ML
INJECTION, SOLUTION ENDOTRACHEAL; INTRAMUSCULAR; INTRAVENOUS; SUBCUTANEOUS CODE/TRAUMA/SEDATION MEDICATION
Status: COMPLETED | OUTPATIENT
Start: 2024-01-01 | End: 2024-01-01

## 2024-01-01 RX ORDER — PREDNISONE 2.5 MG/1
5 TABLET ORAL DAILY
Status: DISCONTINUED | OUTPATIENT
Start: 2024-01-01 | End: 2024-01-01 | Stop reason: HOSPADM

## 2024-01-01 RX ORDER — ALBUMIN (HUMAN) 12.5 G/50ML
12.5 SOLUTION INTRAVENOUS ONCE
Status: COMPLETED | OUTPATIENT
Start: 2024-01-01 | End: 2024-01-01

## 2024-01-01 RX ORDER — PANTOPRAZOLE SODIUM 40 MG/1
40 TABLET, DELAYED RELEASE ORAL
Status: DISCONTINUED | OUTPATIENT
Start: 2024-01-01 | End: 2024-01-01 | Stop reason: HOSPADM

## 2024-01-01 RX ORDER — MIDODRINE HYDROCHLORIDE 5 MG/1
10 TABLET ORAL ONCE
Status: COMPLETED | OUTPATIENT
Start: 2024-01-01 | End: 2024-01-01

## 2024-01-01 RX ORDER — HEPARIN SODIUM 10000 [USP'U]/100ML
3-20 INJECTION, SOLUTION INTRAVENOUS
Status: DISCONTINUED | OUTPATIENT
Start: 2024-01-01 | End: 2024-01-01 | Stop reason: HOSPADM

## 2024-01-01 RX ORDER — TACROLIMUS 1 MG/1
1 CAPSULE ORAL
Status: DISCONTINUED | OUTPATIENT
Start: 2024-01-01 | End: 2024-01-01 | Stop reason: HOSPADM

## 2024-01-01 RX ORDER — EPINEPHRINE 0.1 MG/ML
INJECTION INTRAVENOUS CODE/TRAUMA/SEDATION MEDICATION
Status: COMPLETED | OUTPATIENT
Start: 2024-01-01 | End: 2024-01-01

## 2024-01-01 RX ORDER — CALCITRIOL 0.25 UG/1
0.5 CAPSULE, LIQUID FILLED ORAL 3 TIMES WEEKLY
Status: DISCONTINUED | OUTPATIENT
Start: 2024-06-03 | End: 2024-01-01 | Stop reason: HOSPADM

## 2024-01-01 RX ADMIN — ALBUMIN (HUMAN) 25 G: 0.25 INJECTION, SOLUTION INTRAVENOUS at 06:11

## 2024-01-01 RX ADMIN — CALCIUM CHLORIDE 1 G: 100 INJECTION INTRAVENOUS; INTRAVENTRICULAR at 09:20

## 2024-01-01 RX ADMIN — EPINEPHRINE 1 MG: 0.1 INJECTION INTRAVENOUS at 09:33

## 2024-01-01 RX ADMIN — INSULIN HUMAN 10 UNITS: 100 INJECTION, SOLUTION PARENTERAL at 00:13

## 2024-01-01 RX ADMIN — EPINEPHRINE 1 MG: 0.1 INJECTION INTRAVENOUS at 09:26

## 2024-01-01 RX ADMIN — INSULIN GLARGINE 10 UNITS: 100 INJECTION, SOLUTION SUBCUTANEOUS at 05:52

## 2024-01-01 RX ADMIN — SODIUM BICARBONATE 50 MEQ: 84 INJECTION, SOLUTION INTRAVENOUS at 09:23

## 2024-01-01 RX ADMIN — CALCIUM CHLORIDE 1 G: 100 INJECTION INTRAVENOUS; INTRAVENTRICULAR at 09:39

## 2024-01-01 RX ADMIN — EPINEPHRINE 1 MG: 0.1 INJECTION INTRAVENOUS at 09:40

## 2024-01-01 RX ADMIN — EPINEPHRINE 1 MG: 0.1 INJECTION INTRAVENOUS at 09:21

## 2024-01-01 RX ADMIN — ALBUMIN (HUMAN) 12.5 G: 0.25 INJECTION, SOLUTION INTRAVENOUS at 23:34

## 2024-01-01 RX ADMIN — PANTOPRAZOLE SODIUM 40 MG: 40 TABLET, DELAYED RELEASE ORAL at 06:12

## 2024-01-01 RX ADMIN — DEXTROSE MONOHYDRATE 25 ML: 25 INJECTION, SOLUTION INTRAVENOUS at 00:16

## 2024-01-01 RX ADMIN — EPINEPHRINE 1 MG: 0.1 INJECTION INTRAVENOUS at 09:25

## 2024-01-01 RX ADMIN — SODIUM BICARBONATE 50 MEQ: 84 INJECTION INTRAVENOUS at 09:49

## 2024-01-01 RX ADMIN — ATROPINE SULFATE 0.5 MG: 0.1 INJECTION INTRAVENOUS at 09:37

## 2024-01-01 RX ADMIN — SODIUM BICARBONATE 50 MEQ: 84 INJECTION, SOLUTION INTRAVENOUS at 09:20

## 2024-01-01 RX ADMIN — EPINEPHRINE 1 MG: 0.1 INJECTION INTRAVENOUS at 09:28

## 2024-01-01 RX ADMIN — EPINEPHRINE 1 MG: 0.1 INJECTION INTRAVENOUS at 09:19

## 2024-01-01 RX ADMIN — INSULIN LISPRO 5 UNITS: 100 INJECTION, SOLUTION INTRAVENOUS; SUBCUTANEOUS at 05:50

## 2024-01-01 RX ADMIN — SODIUM BICARBONATE 50 MEQ: 84 INJECTION INTRAVENOUS at 09:35

## 2024-01-01 RX ADMIN — CALCIUM GLUCONATE 1 G: 20 INJECTION, SOLUTION INTRAVENOUS at 00:19

## 2024-01-01 RX ADMIN — AMIODARONE HYDROCHLORIDE 300 MG: 50 INJECTION, SOLUTION INTRAVENOUS at 09:22

## 2024-01-01 RX ADMIN — ALBUTEROL SULFATE 15 MG: 2.5 SOLUTION RESPIRATORY (INHALATION) at 00:25

## 2024-01-01 RX ADMIN — CALCIUM CHLORIDE 1 G: 100 INJECTION INTRAVENOUS; INTRAVENTRICULAR at 09:47

## 2024-01-01 RX ADMIN — Medication 50 MG: at 09:44

## 2024-01-01 RX ADMIN — MIDODRINE HYDROCHLORIDE 10 MG: 5 TABLET ORAL at 02:09

## 2024-01-01 RX ADMIN — AMIODARONE HYDROCHLORIDE 300 MG: 50 INJECTION, SOLUTION INTRAVENOUS at 09:24

## 2024-01-01 RX ADMIN — MIDODRINE HYDROCHLORIDE 10 MG: 5 TABLET ORAL at 06:12

## 2024-01-01 RX ADMIN — AMIODARONE HYDROCHLORIDE 150 MG: 50 INJECTION, SOLUTION INTRAVENOUS at 09:32

## 2024-01-01 RX ADMIN — INSULIN LISPRO 3 UNITS: 100 INJECTION, SOLUTION INTRAVENOUS; SUBCUTANEOUS at 08:45

## 2024-01-01 RX ADMIN — EPINEPHRINE 10 MCG/MIN: 1 INJECTION, SOLUTION, CONCENTRATE INTRAVENOUS at 09:25

## 2024-01-01 RX ADMIN — EPINEPHRINE 1 MG: 0.1 INJECTION INTRAVENOUS at 09:18

## 2024-01-01 RX ADMIN — EPINEPHRINE 1 MG: 0.1 INJECTION INTRAVENOUS at 09:39

## 2024-01-03 ENCOUNTER — APPOINTMENT (OUTPATIENT)
Dept: PHYSICAL THERAPY | Facility: CLINIC | Age: 54
End: 2024-01-03
Payer: MEDICARE

## 2024-01-05 ENCOUNTER — OFFICE VISIT (OUTPATIENT)
Dept: PHYSICAL THERAPY | Facility: CLINIC | Age: 54
End: 2024-01-05
Payer: MEDICARE

## 2024-01-05 DIAGNOSIS — R29.898 MUSCULAR DECONDITIONING: Primary | ICD-10-CM

## 2024-01-05 DIAGNOSIS — R26.2 AMBULATORY DYSFUNCTION: ICD-10-CM

## 2024-01-05 PROCEDURE — 97112 NEUROMUSCULAR REEDUCATION: CPT

## 2024-01-05 PROCEDURE — 97116 GAIT TRAINING THERAPY: CPT

## 2024-01-05 PROCEDURE — 97110 THERAPEUTIC EXERCISES: CPT

## 2024-01-05 NOTE — PROGRESS NOTES
"Daily Note     Today's date: 2024  Patient name: Berto Faria  : 1970  MRN: [id number]  Referring provider: Dalton Anderson DO  Dx:   Encounter Diagnosis     ICD-10-CM    1. Muscular deconditioning  R29.898       2. Ambulatory dysfunction  R26.2           Start Time: 07  Stop Time: 0759  Total time in clinic (min): 57 minutes    Subjective: Alessandro reported that he is doing well, and he is looking forward to walking more at therapy today. He went for dinner the other week, and he used his cane to walk around without issue. Alessandro is still cautious while walking, and he takes the appropriate precautions with practicing his gait at home (i.e. counter support and SPC by side).      Objective: See treatment diary below; Alfredo Vela PT, DPT supervised the hip ADD isometric and supine SLRs      Assessment: Alessandro tolerated treatment well. Side steps and backward ambulation was added to his program to improve his lateral and posterior stability, and verbal cues were employed to equalize his step length and reduce his postural sway. Reaching outside of his TONJA was performed, and he had difficulty with cross-body reaching, especially to the left with the RUE. He was encouraged to practice his SPC gait training over the weekend to maximize the carryover between visits. Alessandro demonstrated fatigue post treatment and would benefit from continued PT to improve his gait mechanics and confidence while ambulating in the community.       Plan: Continue per plan of care.        Precautions: Fall Risk, R AKA, DM, dialysis patient, HTN, h/o MI, and CKD    Manuals                      Neuro Re-Ed         Palloff Press      5x5\" R  Red   Sharpened Romberg         Tandem Stance         Stand and Reach Outside TONJA    25x RUE  20x LUE  Foam              Ther Ex         NuStep for LE Strength and Endurance  S: 5 and A: 10 6 min  L3 L3 5' 7 min  L1 5 min  L1  8 min  L5 --> L2   Standing SLR 3-Way " "3x10 each 3x10 each 3x10 ea  2x10 each  1 lbs   FLX RLE  ABD/EXT BLE  3x10 each   Standing HS Curls on LLE 3x10  1.5 lbs 3x10  1.5 lbs 3x10  1.5 lbs 3x10  1.5 lbs  2x10  1.5 lbs   Supine Hip ADD w/ PGB 20x5\" 20x5\" 20x 5\" hd  20x5\"  20x5\"   Seated Hip ABD w/ TheraBand 3x10  Green 3x10  Green 3x10  Green       LAQ on LLE 2x10  2.5 lbs  2x10  2.5 lbs  2x10  2.5 lbs  2x10  2.5 lbs  2x10   1.5 lbs    Bridges 2x15   2x10  6 lbs  3x10   Supine SLR 2x10 w/ MA RLE 2x10 w/ MA RLE 2x10 w/   MA RLE 2x10   1 lbs LLE  MA RLE  2x10 w/ MA RLE            Ther Activity          Step-Ups (FWD/LAT) 20x FWD  4\" LLE 20x FWD  4\" LLE 20x FWD  4\" step      Step Downs         STS                  Gait Training         Treadmill         Gait Training w/ Cane 15 min  In/Out of Parallel Bars 15 min  In/Out of Parallel Bars 15 min  In parallel bars  10 min In Parallel Bars  25 min   Side Steps    5 min     BWD Ambulation    5 min              Modalities                                          "

## 2024-01-07 ENCOUNTER — HOSPITAL ENCOUNTER (INPATIENT)
Facility: HOSPITAL | Age: 54
LOS: 1 days | DRG: 280 | End: 2024-01-08
Attending: EMERGENCY MEDICINE | Admitting: INTERNAL MEDICINE
Payer: MEDICARE

## 2024-01-07 ENCOUNTER — APPOINTMENT (EMERGENCY)
Dept: CT IMAGING | Facility: HOSPITAL | Age: 54
DRG: 280 | End: 2024-01-07
Payer: MEDICARE

## 2024-01-07 ENCOUNTER — APPOINTMENT (EMERGENCY)
Dept: RADIOLOGY | Facility: HOSPITAL | Age: 54
DRG: 280 | End: 2024-01-07
Payer: MEDICARE

## 2024-01-07 DIAGNOSIS — A41.9 SEVERE SEPSIS WITH SEPTIC SHOCK (HCC): ICD-10-CM

## 2024-01-07 DIAGNOSIS — R79.89 ELEVATED TROPONIN: ICD-10-CM

## 2024-01-07 DIAGNOSIS — R65.21 SEVERE SEPSIS WITH SEPTIC SHOCK (HCC): ICD-10-CM

## 2024-01-07 DIAGNOSIS — Z99.2 ESRD (END STAGE RENAL DISEASE) ON DIALYSIS (HCC): ICD-10-CM

## 2024-01-07 DIAGNOSIS — R57.9 SHOCK (HCC): ICD-10-CM

## 2024-01-07 DIAGNOSIS — J96.01 ACUTE RESPIRATORY FAILURE WITH HYPOXIA (HCC): Primary | ICD-10-CM

## 2024-01-07 DIAGNOSIS — R94.31 ABNORMAL EKG: ICD-10-CM

## 2024-01-07 DIAGNOSIS — N18.6 ESRD (END STAGE RENAL DISEASE) ON DIALYSIS (HCC): ICD-10-CM

## 2024-01-07 DIAGNOSIS — J18.9 MULTIFOCAL PNEUMONIA: ICD-10-CM

## 2024-01-07 DIAGNOSIS — I50.32 CHRONIC DIASTOLIC CONGESTIVE HEART FAILURE (HCC): ICD-10-CM

## 2024-01-07 PROBLEM — I50.33 ACUTE ON CHRONIC DIASTOLIC CONGESTIVE HEART FAILURE (HCC): Status: ACTIVE | Noted: 2022-10-10

## 2024-01-07 LAB
2HR DELTA HS TROPONIN: 213 NG/L
4HR DELTA HS TROPONIN: 811 NG/L
ALBUMIN SERPL BCP-MCNC: 3.9 G/DL (ref 3.5–5)
ALP SERPL-CCNC: 98 U/L (ref 34–104)
ALT SERPL W P-5'-P-CCNC: 19 U/L (ref 7–52)
ANION GAP SERPL CALCULATED.3IONS-SCNC: 12 MMOL/L
APTT PPP: 37 SECONDS (ref 23–37)
APTT PPP: 54 SECONDS (ref 23–37)
AST SERPL W P-5'-P-CCNC: 27 U/L (ref 13–39)
BASE EX.OXY STD BLDV CALC-SCNC: 39.8 % (ref 60–80)
BASE EXCESS BLDV CALC-SCNC: 0.6 MMOL/L
BASOPHILS # BLD AUTO: 0.04 THOUSANDS/ÂΜL (ref 0–0.1)
BASOPHILS NFR BLD AUTO: 0 % (ref 0–1)
BILIRUB SERPL-MCNC: 0.65 MG/DL (ref 0.2–1)
BNP SERPL-MCNC: 1934 PG/ML (ref 0–100)
BUN SERPL-MCNC: 30 MG/DL (ref 5–25)
CALCIUM SERPL-MCNC: 9.3 MG/DL (ref 8.4–10.2)
CARDIAC TROPONIN I PNL SERPL HS: ABNORMAL NG/L
CHLORIDE SERPL-SCNC: 96 MMOL/L (ref 96–108)
CO2 SERPL-SCNC: 27 MMOL/L (ref 21–32)
CREAT SERPL-MCNC: 5.36 MG/DL (ref 0.6–1.3)
D DIMER PPP FEU-MCNC: 0.44 UG/ML FEU
EOSINOPHIL # BLD AUTO: 0.14 THOUSAND/ÂΜL (ref 0–0.61)
EOSINOPHIL NFR BLD AUTO: 1 % (ref 0–6)
ERYTHROCYTE [DISTWIDTH] IN BLOOD BY AUTOMATED COUNT: 15.4 % (ref 11.6–15.1)
EST. AVERAGE GLUCOSE BLD GHB EST-MCNC: 174 MG/DL
FLUAV RNA RESP QL NAA+PROBE: NEGATIVE
FLUBV RNA RESP QL NAA+PROBE: NEGATIVE
GAS + CO PNL BLDA: 2.6 % (ref 0–1.5)
GFR SERPL CREATININE-BSD FRML MDRD: 11 ML/MIN/1.73SQ M
GLUCOSE SERPL-MCNC: 101 MG/DL (ref 65–140)
GLUCOSE SERPL-MCNC: 119 MG/DL (ref 65–140)
GLUCOSE SERPL-MCNC: 82 MG/DL (ref 65–140)
HBA1C MFR BLD: 7.7 %
HCO3 BLDV-SCNC: 25.9 MMOL/L (ref 24–30)
HCT VFR BLD AUTO: 37.9 % (ref 36.5–49.3)
HGB BLD-MCNC: 12 G/DL (ref 12–17)
IMM GRANULOCYTES # BLD AUTO: 0.05 THOUSAND/UL (ref 0–0.2)
IMM GRANULOCYTES NFR BLD AUTO: 0 % (ref 0–2)
INR PPP: 1 (ref 0.84–1.19)
LACTATE SERPL-SCNC: 1.3 MMOL/L (ref 0.5–2)
LYMPHOCYTES # BLD AUTO: 1.4 THOUSANDS/ÂΜL (ref 0.6–4.47)
LYMPHOCYTES NFR BLD AUTO: 10 % (ref 14–44)
MCH RBC QN AUTO: 32.5 PG (ref 26.8–34.3)
MCHC RBC AUTO-ENTMCNC: 31.7 G/DL (ref 31.4–37.4)
MCV RBC AUTO: 103 FL (ref 82–98)
MONOCYTES # BLD AUTO: 1.12 THOUSAND/ÂΜL (ref 0.17–1.22)
MONOCYTES NFR BLD AUTO: 8 % (ref 4–12)
NEUTROPHILS # BLD AUTO: 10.83 THOUSANDS/ÂΜL (ref 1.85–7.62)
NEUTS SEG NFR BLD AUTO: 81 % (ref 43–75)
NRBC BLD AUTO-RTO: 0 /100 WBCS
O2 CT BLDV-SCNC: 6.9 ML/DL
PCO2 BLDV: 44 MM HG (ref 42–50)
PH BLDV: 7.39 [PH] (ref 7.3–7.4)
PLATELET # BLD AUTO: 250 THOUSANDS/UL (ref 149–390)
PMV BLD AUTO: 8.7 FL (ref 8.9–12.7)
PO2 BLDV: 22 MM HG (ref 35–45)
POTASSIUM SERPL-SCNC: 3.5 MMOL/L (ref 3.5–5.3)
PROCALCITONIN SERPL-MCNC: 0.44 NG/ML
PROT SERPL-MCNC: 7 G/DL (ref 6.4–8.4)
PROTHROMBIN TIME: 13.1 SECONDS (ref 11.6–14.5)
RBC # BLD AUTO: 3.69 MILLION/UL (ref 3.88–5.62)
RSV RNA RESP QL NAA+PROBE: NEGATIVE
SARS-COV-2 RNA RESP QL NAA+PROBE: NEGATIVE
SODIUM SERPL-SCNC: 135 MMOL/L (ref 135–147)
WBC # BLD AUTO: 13.58 THOUSAND/UL (ref 4.31–10.16)

## 2024-01-07 PROCEDURE — 84484 ASSAY OF TROPONIN QUANT: CPT | Performed by: EMERGENCY MEDICINE

## 2024-01-07 PROCEDURE — 94760 N-INVAS EAR/PLS OXIMETRY 1: CPT

## 2024-01-07 PROCEDURE — 96368 THER/DIAG CONCURRENT INF: CPT

## 2024-01-07 PROCEDURE — NC001 PR NO CHARGE

## 2024-01-07 PROCEDURE — 94664 DEMO&/EVAL PT USE INHALER: CPT

## 2024-01-07 PROCEDURE — 87040 BLOOD CULTURE FOR BACTERIA: CPT | Performed by: EMERGENCY MEDICINE

## 2024-01-07 PROCEDURE — 99291 CRITICAL CARE FIRST HOUR: CPT | Performed by: EMERGENCY MEDICINE

## 2024-01-07 PROCEDURE — 85730 THROMBOPLASTIN TIME PARTIAL: CPT | Performed by: EMERGENCY MEDICINE

## 2024-01-07 PROCEDURE — 05H533Z INSERTION OF INFUSION DEVICE INTO RIGHT SUBCLAVIAN VEIN, PERCUTANEOUS APPROACH: ICD-10-PCS

## 2024-01-07 PROCEDURE — 82375 ASSAY CARBOXYHB QUANT: CPT | Performed by: EMERGENCY MEDICINE

## 2024-01-07 PROCEDURE — 82948 REAGENT STRIP/BLOOD GLUCOSE: CPT

## 2024-01-07 PROCEDURE — 83605 ASSAY OF LACTIC ACID: CPT | Performed by: EMERGENCY MEDICINE

## 2024-01-07 PROCEDURE — 94002 VENT MGMT INPAT INIT DAY: CPT

## 2024-01-07 PROCEDURE — 71250 CT THORAX DX C-: CPT

## 2024-01-07 PROCEDURE — 02HV33Z INSERTION OF INFUSION DEVICE INTO SUPERIOR VENA CAVA, PERCUTANEOUS APPROACH: ICD-10-PCS | Performed by: INTERNAL MEDICINE

## 2024-01-07 PROCEDURE — 71045 X-RAY EXAM CHEST 1 VIEW: CPT

## 2024-01-07 PROCEDURE — 99285 EMERGENCY DEPT VISIT HI MDM: CPT

## 2024-01-07 PROCEDURE — 0202U NFCT DS 22 TRGT SARS-COV-2: CPT

## 2024-01-07 PROCEDURE — 83880 ASSAY OF NATRIURETIC PEPTIDE: CPT | Performed by: EMERGENCY MEDICINE

## 2024-01-07 PROCEDURE — 84145 PROCALCITONIN (PCT): CPT | Performed by: EMERGENCY MEDICINE

## 2024-01-07 PROCEDURE — 93005 ELECTROCARDIOGRAM TRACING: CPT

## 2024-01-07 PROCEDURE — 85025 COMPLETE CBC W/AUTO DIFF WBC: CPT | Performed by: EMERGENCY MEDICINE

## 2024-01-07 PROCEDURE — 96375 TX/PRO/DX INJ NEW DRUG ADDON: CPT

## 2024-01-07 PROCEDURE — G1004 CDSM NDSC: HCPCS

## 2024-01-07 PROCEDURE — 85379 FIBRIN DEGRADATION QUANT: CPT | Performed by: EMERGENCY MEDICINE

## 2024-01-07 PROCEDURE — 83036 HEMOGLOBIN GLYCOSYLATED A1C: CPT

## 2024-01-07 PROCEDURE — 0241U HB NFCT DS VIR RESP RNA 4 TRGT: CPT | Performed by: EMERGENCY MEDICINE

## 2024-01-07 PROCEDURE — 99223 1ST HOSP IP/OBS HIGH 75: CPT | Performed by: INTERNAL MEDICINE

## 2024-01-07 PROCEDURE — 80053 COMPREHEN METABOLIC PANEL: CPT | Performed by: EMERGENCY MEDICINE

## 2024-01-07 PROCEDURE — 85610 PROTHROMBIN TIME: CPT | Performed by: EMERGENCY MEDICINE

## 2024-01-07 PROCEDURE — 82805 BLOOD GASES W/O2 SATURATION: CPT | Performed by: EMERGENCY MEDICINE

## 2024-01-07 PROCEDURE — 96367 TX/PROPH/DG ADDL SEQ IV INF: CPT

## 2024-01-07 PROCEDURE — 96365 THER/PROPH/DIAG IV INF INIT: CPT

## 2024-01-07 RX ORDER — ASPIRIN 81 MG/1
81 TABLET, CHEWABLE ORAL EVERY MORNING
Status: DISCONTINUED | OUTPATIENT
Start: 2024-01-08 | End: 2024-01-08 | Stop reason: HOSPADM

## 2024-01-07 RX ORDER — TACROLIMUS 1 MG/1
2 CAPSULE ORAL EVERY MORNING
Status: DISCONTINUED | OUTPATIENT
Start: 2024-01-08 | End: 2024-01-08 | Stop reason: HOSPADM

## 2024-01-07 RX ORDER — CARVEDILOL 3.12 MG/1
6.25 TABLET ORAL 2 TIMES DAILY WITH MEALS
Status: DISCONTINUED | OUTPATIENT
Start: 2024-01-07 | End: 2024-01-08 | Stop reason: HOSPADM

## 2024-01-07 RX ORDER — ATORVASTATIN CALCIUM 40 MG/1
80 TABLET, FILM COATED ORAL DAILY
Status: DISCONTINUED | OUTPATIENT
Start: 2024-01-07 | End: 2024-01-08 | Stop reason: HOSPADM

## 2024-01-07 RX ORDER — CEFEPIME HYDROCHLORIDE 1 G/50ML
1000 INJECTION, SOLUTION INTRAVENOUS ONCE
Status: COMPLETED | OUTPATIENT
Start: 2024-01-07 | End: 2024-01-07

## 2024-01-07 RX ORDER — INSULIN GLARGINE 100 [IU]/ML
12 INJECTION, SOLUTION SUBCUTANEOUS EVERY 12 HOURS SCHEDULED
Status: DISCONTINUED | OUTPATIENT
Start: 2024-01-07 | End: 2024-01-08 | Stop reason: HOSPADM

## 2024-01-07 RX ORDER — VANCOMYCIN HYDROCHLORIDE 750 MG/150ML
15 INJECTION, SOLUTION INTRAVENOUS ONCE
Status: COMPLETED | OUTPATIENT
Start: 2024-01-07 | End: 2024-01-07

## 2024-01-07 RX ORDER — NIFEDIPINE 30 MG/1
120 TABLET, EXTENDED RELEASE ORAL DAILY
Status: DISCONTINUED | OUTPATIENT
Start: 2024-01-07 | End: 2024-01-08 | Stop reason: HOSPADM

## 2024-01-07 RX ORDER — INSULIN LISPRO 100 [IU]/ML
1-6 INJECTION, SOLUTION INTRAVENOUS; SUBCUTANEOUS
Status: DISCONTINUED | OUTPATIENT
Start: 2024-01-07 | End: 2024-01-08 | Stop reason: HOSPADM

## 2024-01-07 RX ORDER — ALBUTEROL SULFATE 2.5 MG/3ML
2.5 SOLUTION RESPIRATORY (INHALATION) EVERY 6 HOURS PRN
Status: DISCONTINUED | OUTPATIENT
Start: 2024-01-07 | End: 2024-01-08 | Stop reason: HOSPADM

## 2024-01-07 RX ORDER — ISOSORBIDE MONONITRATE 30 MG/1
30 TABLET, EXTENDED RELEASE ORAL DAILY
Status: DISCONTINUED | OUTPATIENT
Start: 2024-01-07 | End: 2024-01-08 | Stop reason: HOSPADM

## 2024-01-07 RX ORDER — TACROLIMUS 1 MG/1
1 CAPSULE ORAL EVERY EVENING
Status: DISCONTINUED | OUTPATIENT
Start: 2024-01-07 | End: 2024-01-08 | Stop reason: HOSPADM

## 2024-01-07 RX ORDER — ASPIRIN 81 MG/1
324 TABLET, CHEWABLE ORAL ONCE
Status: COMPLETED | OUTPATIENT
Start: 2024-01-07 | End: 2024-01-07

## 2024-01-07 RX ORDER — CEFTRIAXONE 1 G/50ML
1000 INJECTION, SOLUTION INTRAVENOUS EVERY 24 HOURS
Status: DISCONTINUED | OUTPATIENT
Start: 2024-01-08 | End: 2024-01-08

## 2024-01-07 RX ORDER — TACROLIMUS 1 MG/1
1 CAPSULE ORAL EVERY 12 HOURS SCHEDULED
Status: DISCONTINUED | OUTPATIENT
Start: 2024-01-07 | End: 2024-01-07

## 2024-01-07 RX ORDER — PANTOPRAZOLE SODIUM 40 MG/1
40 TABLET, DELAYED RELEASE ORAL
Status: DISCONTINUED | OUTPATIENT
Start: 2024-01-08 | End: 2024-01-08 | Stop reason: HOSPADM

## 2024-01-07 RX ORDER — METOPROLOL SUCCINATE 50 MG/1
50 TABLET, EXTENDED RELEASE ORAL DAILY
Status: DISCONTINUED | OUTPATIENT
Start: 2024-01-07 | End: 2024-01-08 | Stop reason: HOSPADM

## 2024-01-07 RX ORDER — HEPARIN SODIUM 10000 [USP'U]/100ML
3-20 INJECTION, SOLUTION INTRAVENOUS
Status: DISCONTINUED | OUTPATIENT
Start: 2024-01-07 | End: 2024-01-08 | Stop reason: HOSPADM

## 2024-01-07 RX ORDER — BUMETANIDE 0.25 MG/ML
4 INJECTION INTRAMUSCULAR; INTRAVENOUS ONCE
Status: DISCONTINUED | OUTPATIENT
Start: 2024-01-07 | End: 2024-01-08 | Stop reason: HOSPADM

## 2024-01-07 RX ORDER — AZATHIOPRINE 50 MG/1
50 TABLET ORAL DAILY
Status: DISCONTINUED | OUTPATIENT
Start: 2024-01-07 | End: 2024-01-08

## 2024-01-07 RX ORDER — DIPHENOXYLATE HYDROCHLORIDE AND ATROPINE SULFATE 2.5; .025 MG/1; MG/1
1 TABLET ORAL DAILY
COMMUNITY

## 2024-01-07 RX ORDER — HEPARIN SODIUM 1000 [USP'U]/ML
2700 INJECTION, SOLUTION INTRAVENOUS; SUBCUTANEOUS ONCE
Status: COMPLETED | OUTPATIENT
Start: 2024-01-07 | End: 2024-01-07

## 2024-01-07 RX ORDER — PREDNISONE 5 MG/1
5 TABLET ORAL DAILY
Status: DISCONTINUED | OUTPATIENT
Start: 2024-01-07 | End: 2024-01-08

## 2024-01-07 RX ORDER — CINACALCET 30 MG/1
30 TABLET, FILM COATED ORAL DAILY
Status: DISCONTINUED | OUTPATIENT
Start: 2024-01-07 | End: 2024-01-08 | Stop reason: HOSPADM

## 2024-01-07 RX ADMIN — ASPIRIN 81 MG CHEWABLE TABLET 324 MG: 81 TABLET CHEWABLE at 11:06

## 2024-01-07 RX ADMIN — ISOSORBIDE MONONITRATE 30 MG: 30 TABLET, EXTENDED RELEASE ORAL at 18:40

## 2024-01-07 RX ADMIN — CEFEPIME HYDROCHLORIDE 1000 MG: 1 INJECTION, SOLUTION INTRAVENOUS at 11:24

## 2024-01-07 RX ADMIN — ATORVASTATIN CALCIUM 80 MG: 40 TABLET, FILM COATED ORAL at 15:52

## 2024-01-07 RX ADMIN — TACROLIMUS 1 MG: 1 CAPSULE ORAL at 18:41

## 2024-01-07 RX ADMIN — HEPARIN SODIUM 2700 UNITS: 1000 INJECTION INTRAVENOUS; SUBCUTANEOUS at 12:22

## 2024-01-07 RX ADMIN — VANCOMYCIN HYDROCHLORIDE 750 MG: 750 INJECTION, SOLUTION INTRAVENOUS at 12:21

## 2024-01-07 RX ADMIN — CINACALCET 30 MG: 30 TABLET, FILM COATED ORAL at 18:41

## 2024-01-07 RX ADMIN — CARVEDILOL 6.25 MG: 3.12 TABLET, FILM COATED ORAL at 16:53

## 2024-01-07 RX ADMIN — AZATHIOPRINE 50 MG: 50 TABLET ORAL at 15:52

## 2024-01-07 RX ADMIN — PREDNISONE 5 MG: 5 TABLET ORAL at 15:52

## 2024-01-07 RX ADMIN — METOPROLOL SUCCINATE 50 MG: 50 TABLET, EXTENDED RELEASE ORAL at 16:01

## 2024-01-07 RX ADMIN — NIFEDIPINE 120 MG: 30 TABLET, FILM COATED, EXTENDED RELEASE ORAL at 18:41

## 2024-01-07 RX ADMIN — HEPARIN SODIUM 12 UNITS/KG/HR: 10000 INJECTION, SOLUTION INTRAVENOUS at 12:23

## 2024-01-07 RX ADMIN — HEPARIN SODIUM 14 UNITS/KG/HR: 10000 INJECTION, SOLUTION INTRAVENOUS at 20:06

## 2024-01-07 NOTE — ASSESSMENT & PLAN NOTE
Hx of failed renal transplant in 2001   Continue daily azathioprine, tacrolimus, prednisone  Following with [place] transplant team currently

## 2024-01-07 NOTE — ASSESSMENT & PLAN NOTE
S/p R AKA 2/1/2019 at [facility] given history of right charcot ankle with recurrent infections, osteomyelitis and subsequent bacteremia

## 2024-01-07 NOTE — H&P
[facility]  H&P  Name: Berto Faria 53 y.o. male I MRN: [id number]  Unit/Bed#: 419-01 I Date of Admission: 1/7/2024   Date of Service: 1/7/2024 I Hospital Day: 0      Assessment/Plan   * Acute on chronic respiratory failure with hypoxia (HCC)  Assessment & Plan  Presents with SOB x 1 day. Associated orthopnea and hypoxia at home. Initially on 6L NC with SpO2 low-80s, after brief period on NRB was weaned to 2-3L NC.   Secondary to pulmonary edema, possible superimposed bacterial pneumonia   Did have mildly elevated carbon monoxide level being treated with supplementation oxygen. VBG with nl pH  D/w nephrology on call - plan to give IV Bumex 4mg x 1, arranging for HD session tomorrow   With low DRIP score, can c/w Rocephin 1g qd for now   Stable on 3L NC, wean as able  Check RP2 panel      Multifocal pneumonia  Assessment & Plan  Hypoxia + new infiltrate on imaging  WBC 13.58. no other SIRS criteria  W/ low drip score, c/w Rocephin 1g q24 as above   Check urinary antigens  Trend WBCs/fever curve/PCT  Follow-up on blood cultures       Elevated troponin  Assessment & Plan  Trop 11,108 > 11,321  EKG with ST & T wave abnormalities in lateral leads  Remains chest pain free. SOB resolved with supplemental oxygen  Suspected type II MI secondary to hypoxia, volume overload as EKG with new ischemic changes   Case reviewed with cards by ED physician - recs cards consult, echo, heparin gtt, suspected secondary to demand  Continue hep gtt  Echo ordered  Telemetry   Formal cardiology consult placed    Acute on chronic diastolic congestive heart failure (HCC)  Assessment & Plan  Wt Readings from Last 3 Encounters:   01/07/24 49.1 kg (108 lb 3.9 oz)   08/21/23 51.4 kg (113 lb 6.4 oz)   08/07/23 54.4 kg (120 lb)     Presenting with volume overload evidenced on imaging, elevated BNP and clinically  Recent echo with normal EF, repeat echo requesting given possible type II MI vs nonischemic myocardial injury  "  Give IV Bumex 4mg x 1  For extra HD session tomorrow   Daily weights,I&Os, Na/fluid restriction           Pancreatic lesion  Assessment & Plan  Small cysts in the partially visualized pancreas measuring up to 1.6 cm grossly unchanged since January 2022. Advise nonemergent outpatient abdomen MRI/MRCP follow-up to assess 2-year stability.     ESRD (end stage renal disease) on dialysis ([facility])  Assessment & Plan  Lab Results   Component Value Date    EGFR 11 01/07/2024    EGFR 17 04/27/2023    EGFR 8 10/12/2022    CREATININE 5.36 (H) 01/07/2024    CREATININE 3.68 (H) 04/27/2023    CREATININE 6.62 (H) 10/12/2022     Hx ESRD with R AV fistula   HD schedule: T,TH,S  Last session 1/6/24  Daily weights, I&Os, fluid restriction  Nephrology consult appreciated   D/w nephro oncall, planning for session tomorrow with acute fluid overlaod     S/P AKA (above knee amputation), right ([facility])  Assessment & Plan  S/p R AKA 2/1/2019 at [facility] given history of right charcot ankle with recurrent infections, osteomyelitis and subsequent bacteremia     Renal transplant, status post  Assessment & Plan  Hx of failed renal transplant in 2001   Continue daily azathioprine, tacrolimus, prednisone  Following with [place] transplant team currently    Type 1 diabetes mellitus ([facility])  Assessment & Plan  Lab Results   Component Value Date    HGBA1C 7.3 (H) 05/17/2023       No results for input(s): \"POCGLU\" in the last 72 hours.    Blood Sugar Average: Last 72 hrs:    Update A1c  Continue lantus 12 units q12  Take Novolog 8 units with meals as needed. Hold scheduled, cover with SSI alone for now              VTE Pharmacologic Prophylaxis:   heparin gtt  Code Status: Level 1 - Full Code   Discussion with family: Patient declined call to .     Anticipated Length of Stay: Patient will be admitted on an inpatient basis with an anticipated length of stay of greater than 2 midnights secondary to acute hypoxic respiratory failure, pneumonia, " volume overload.    Total Time Spent on Date of Encounter in care of patient:  mins. This time was spent on one or more of the following: performing physical exam; counseling and coordination of care; obtaining or reviewing history; documenting in the medical record; reviewing/ordering tests, medications or procedures; communicating with other healthcare professionals and discussing with patient's family/caregivers.    Chief Complaint: SOB    History of Present Illness:  Berto Faria is a 53 y.o. male with a PMH of ESRD on HD, HFpEF,T1DM hx renal transplant who presents with shortness of breath x 1 day. Notes he was not feeling right yesterday, however took his pulse ox and was normal at time. Developed hypoxia earlier this morning and noted to be in high-70s. Does note some new development of orthopnea as well. He was been compliant with HD sessions and is following at least 32 ounce fluid restriction at home. He otherwise denies fever, chills, cough, chest pain. Notes his wife works in health care and was exposed to flu/COVID.     In ED, was noted to be hypoxic in low-80s on 6L, improved after time on NRB mask and weaned to 3L NC. Also with slight leukocytosis, elevated BNP at 1934. Also with elevated troponin in 11,000s. Carbon monoxide level 2.6. EKG revealing some new ischemic changes. CT chest revealing pulmonary edema and possible superimposed pneumonia. Case was reviewed with cardiology who suspected demand/type II MI and recommended admission wit heparin gtt, telemetry monitoring and repeat echo.     Review of Systems:  Review of Systems   Constitutional:  Negative for chills and fever.   HENT:  Negative for congestion.    Respiratory:  Positive for shortness of breath. Negative for cough, chest tightness and wheezing.    Cardiovascular:  Negative for chest pain, palpitations and leg swelling.   Gastrointestinal:  Negative for abdominal pain, diarrhea, nausea and vomiting.       Past Medical and  Surgical History:   Past Medical History:   Diagnosis Date    Bacteremia 12/21/2018    CAD (coronary artery disease)     s/p MARC to LCx, pLAD 2/2018    Cardiac arrest (HCC)     Chronic kidney disease     Diabetes mellitus type 1 (HCC)     Dialysis patient (HCC)     Access in right chest    GI bleed     Hyperlipidemia     Hypertension     Infection at site of external fixator pin (HCC)     MI (myocardial infarction) (HCC)     Myocardial infarction (HCC)     Pneumonia     Last Assessed [id number]    Renal failure     Renal transplant, status post 07/21/2007       Past Surgical History:   Procedure Laterality Date    AMPUTATION Right 02/2019    aboce knee    ANKLE FRACTURE SURGERY      ANKLE HARDWARE REMOVAL Right 7/31/2017    Procedure: REMOVAL HARDWARE ANKLE;  Surgeon: Alvin Leon MD;  Location: [facility];  Service: Orthopedics    ANKLE HARDWARE REMOVAL Right 8/17/2017    Procedure: TIBIA FAILED HARDWARE REMOVAL;  Surgeon: Alvin Leon MD;  Location: [facility];  Service: Orthopedics    CLOSED REDUCTION ANKLE Right 7/3/2017    Procedure: CLOSED REDUCTION DISTAL TIB-FIB AND CASTING VS;  Surgeon: Alvin Leon MD;  Location: [facility];  Service: Orthopedics    CORONARY ANGIOPLASTY WITH STENT PLACEMENT  02/2018    CAD s/p MARC to LCx, pLAD    ESOPHAGOGASTRODUODENOSCOPY N/A 12/20/2018    Procedure: ESOPHAGOGASTRODUODENOSCOPY (EGD) in ICU;  Surgeon: Galileo Wise IV, MD;  Location: [facility];  Service: Gastroenterology    EYE SURGERY      FRACTURE SURGERY      ORIF Rt Ankle    GLUTAMIC ACID DECARBOXYLASE (HISTORICAL)      IR AV FISTULAGRAM/GRAFTOGRAM  4/16/2020    IR AV FISTULAGRAM/GRAFTOGRAM  6/23/2023    IR PICC LINE  8/20/2018    IR TUNNELED DIALYSIS CATHETER CHECK/CHANGE/REPOSITION/ANGIOPLASTY  1/8/2020    IR TUNNELED DIALYSIS CATHETER PLACEMENT  10/21/2019    IR TUNNELED DIALYSIS CATHETER REMOVAL  5/29/2020    IR VENOUS LINE REMOVAL  10/5/2018    LEG AMPUTATION Right 02/01/2019    Above the knee    NEPHRECTOMY  TRANSPLANTED ORGAN      NM ARTERIOVENOUS ANASTOMOSIS OPEN DIRECT Right 2/11/2020    Procedure: CREATION FISTULA ARTERIOVENOUS (AV) right upper extremity;  Surgeon: Carlos Medina MD;  Location:  MAIN OR;  Service: Vascular    NM OPEN TREATMENT FRACTURE DISTAL TIBIA FIBULA Right 7/3/2017    Procedure: OPEN REDUCTION W/ INTERNAL FIXATION (ORIF);  Surgeon: Alvin Leon MD;  Location: [facility];  Service: Orthopedics    TOE AMPUTATION Right 10/27/2016    Procedure: AMPUTATION TOE;  Surgeon: Krishna Ruiz DPM;  Location: [facility];  Service:        Meds/Allergies:  Prior to Admission medications    Medication Sig Start Date End Date Taking? Authorizing Provider   aspirin 81 mg chewable tablet Chew 81 mg every morning     Historical Provider, MD   atorvastatin (LIPITOR) 80 mg tablet Take 1 tablet (80 mg total) by mouth daily 10/24/23   GAVINO Ardon   azaTHIOprine (IMURAN) 50 mg tablet Take 1 tablet (50 mg total) by mouth daily 6/29/23   Teo Stevenson DO   B Complex-C-Folic Acid (triphrocaps) 1 MG CAPS Take 1 capsule (1 mg total) by mouth daily 12/5/23   GAVINO Ardon   carvedilol (COREG) 6.25 mg tablet Take 1 tablet (6.25 mg total) by mouth 2 (two) times a day with meals 9/22/23   GAVINO Ardon   cholecalciferol (VITAMIN D3) 1,000 units tablet Take 5 tablets (5,000 Units total) by mouth daily for 14 days 10/13/22 7/7/23  GAVINO Ardon   cinacalcet (SENSIPAR) 30 mg tablet Take 1 tablet (30 mg total) by mouth daily 3/7/22   GAVINO Ardon   Glucagon, rDNA, (Glucagon Emergency) 1 MG KIT INJECT 1MG SUBCUTANEOUSLY ONCE AS NEEDED FOR LOW BLOOD SUGAR FOR UP TO 1 DOSE 10/3/22   Dalton Anderson DO   glucose blood (Contour Next Test) test strip Use as instructed 6 times daily 12/12/23   Teo Stevenson DO   insulin aspart (NovoLOG) 100 units/mL injection 10 Units by Subcutaneous Insulin Pump route 3 (three) times a day 8/21/23   GAVINO Ardon   insulin glargine (Semglee) 100 units/mL  "subcutaneous injection Inject 10 Units under the skin every 12 (twelve) hours 2/17/23   Teo Stevenson DO   Insulin Syringe-Needle U-100 31G X 15/64\" 0.5 ML MISC Use 3 (three) times a day 3/10/22   GAVINO Ardon   isosorbide mononitrate (IMDUR) 30 mg 24 hr tablet Take 1 tablet (30 mg total) by mouth daily 10/22/23   Teo Stevenson DO   metoprolol succinate (TOPROL-XL) 50 mg 24 hr tablet Take 1 tablet (50 mg total) by mouth daily 10/22/23   Teo Stevenson DO   NIFEdipine (PROCARDIA XL) 30 mg 24 hr tablet Take 4 tablets (120 mg total) by mouth daily 10/17/23   GAVINO Ardon   pantoprazole (PROTONIX) 40 mg tablet Take 1 tablet (40 mg total) by mouth daily 11/28/23   GAVINO Ardon   predniSONE 5 mg tablet Take 1 tablet (5 mg total) by mouth daily 10/24/23   GAVINO Ardon   sevelamer carbonate (RENVELA) 800 mg tablet Take 1 tablet (800 mg total) by mouth 4 (four) times a day 7/31/23   GAVINO Ardon   tacrolimus (PROGRAF) 1 mg capsule 2 caps in the morning and 1 cap in the evening 9/26/23   Teo Stevenson DO     I have reviewed home medications with patient personally.    Allergies: No Known Allergies    Social History:  Marital Status: /Civil Union   Occupation:   Patient Pre-hospital Living Situation: Home  Patient Pre-hospital Level of Mobility: walks  Patient Pre-hospital Diet Restrictions: 32 ounce fluid restriction   Substance Use History:   Social History     Substance and Sexual Activity   Alcohol Use Not Currently    Alcohol/week: 0.0 standard drinks of alcohol     Social History     Tobacco Use   Smoking Status Never   Smokeless Tobacco Never     Social History     Substance and Sexual Activity   Drug Use Never       Family History:  Family History   Problem Relation Age of Onset    Diabetes Brother     Coronary artery disease Mother     No Known Problems Father        Physical Exam:     Vitals:   Blood Pressure: 121/65 (01/07/24 1541)  Pulse: 84 (01/07/24 " "1541)  Temperature: 97.9 °F (36.6 °C) (01/07/24 1503)  Temp Source: Oral (01/07/24 1413)  Respirations: 18 (01/07/24 1413)  Height: 5' 4\" (162.6 cm) (01/07/24 1413)  Weight - Scale: 49.1 kg (108 lb 3.9 oz) (01/07/24 1520)  SpO2: 91 % (01/07/24 1541)    Physical Exam  Constitutional:       General: He is not in acute distress.     Appearance: He is ill-appearing (chronically ill appearing).   HENT:      Head: Normocephalic and atraumatic.   Cardiovascular:      Rate and Rhythm: Normal rate and regular rhythm.   Pulmonary:      Effort: Pulmonary effort is normal. No respiratory distress.      Breath sounds: Rales (bibasilar rales) present. No wheezing.      Comments: Stable on 3L   No increased respiratory effort, no accessory muscle usage, no conversational dyspnea  Abdominal:      General: Abdomen is flat. Bowel sounds are normal. There is no distension.      Palpations: Abdomen is soft.   Musculoskeletal:      Left lower leg: No edema.      Comments: R AKA   Skin:     General: Skin is warm and dry.      Coloration: Skin is pale.   Neurological:      General: No focal deficit present.      Mental Status: He is alert and oriented to person, place, and time.   Psychiatric:         Mood and Affect: Mood normal.         Behavior: Behavior normal.          Additional Data:     Lab Results:  Results from last 7 days   Lab Units 01/07/24  1015   WBC Thousand/uL 13.58*   HEMOGLOBIN g/dL 12.0   HEMATOCRIT % 37.9   PLATELETS Thousands/uL 250   NEUTROS PCT % 81*   LYMPHS PCT % 10*   MONOS PCT % 8   EOS PCT % 1     Results from last 7 days   Lab Units 01/07/24  1015   SODIUM mmol/L 135   POTASSIUM mmol/L 3.5   CHLORIDE mmol/L 96   CO2 mmol/L 27   BUN mg/dL 30*   CREATININE mg/dL 5.36*   ANION GAP mmol/L 12   CALCIUM mg/dL 9.3   ALBUMIN g/dL 3.9   TOTAL BILIRUBIN mg/dL 0.65   ALK PHOS U/L 98   ALT U/L 19   AST U/L 27   GLUCOSE RANDOM mg/dL 119     Results from last 7 days   Lab Units 01/07/24  1015   INR  1.00     Results from " last 7 days   Lab Units 01/07/24  1543   POC GLUCOSE mg/dl 82         Results from last 7 days   Lab Units 01/07/24  1109 01/07/24  1015   LACTIC ACID mmol/L  --  1.3   PROCALCITONIN ng/ml 0.44*  --        Lines/Drains:  Invasive Devices       Peripheral Intravenous Line  Duration             Peripheral IV 01/07/24 Distal;Left;Upper;Ventral (anterior) Arm <1 day    Peripheral IV 01/07/24 Dorsal (posterior);Left Hand <1 day              Line  Duration             Hemodialysis AV Fistula Right Upper arm -- days                        Imaging: Reviewed radiology reports from this admission including: chest xray and chest CT scan  CT chest without contrast   Final Result by Galileo Del Valle MD (01/07 1227)      Bilateral multi lobar airspace consolidation, groundglass opacities, smooth septal thickening and small bilateral pleural effusions attributed to pulmonary vascular congestion. Underlying superimposed pneumonia is not excluded. Correlate with clinical    findings.      Small cysts in the partially visualized pancreas measuring up to 1.6 cm grossly unchanged since January 2022. Advise nonemergent outpatient abdomen MRI/MRCP follow-up to assess 2-year stability.      Additional chronic findings and negatives as above.      This study demonstrates a significant  finding and was documented as such in [facility] for liaison and referring practitioner notification.      This study demonstrates a finding requiring imaging follow-up and was documented as such in Epic.         Workstation performed: [id number]         XR chest 1 view portable   Final Result by Giacomo Mancera MD (01/07 2379)      Pneumonia and pulmonary edema.               Workstation performed: [id number]             EKG and Other Studies Reviewed on Admission:   EKG: NSR. HR 84. ST & T wave abnormality     ** Please Note: This note has been constructed using a voice recognition system. **

## 2024-01-07 NOTE — ASSESSMENT & PLAN NOTE
Small cysts in the partially visualized pancreas measuring up to 1.6 cm grossly unchanged since January 2022. Advise nonemergent outpatient abdomen MRI/MRCP follow-up to assess 2-year stability.

## 2024-01-07 NOTE — ED PROVIDER NOTES
"History  Chief Complaint   Patient presents with    Shortness of Breath     Patient brought by ems for SOB, reports o2 in the 70s. Arrived on 2L at 80%. Does not normally wear o2. Had dialysis yesterday. Hx of copd     53-year-old male presents for evaluation of hypoxia.  Patient reports feeling off over the last few days, he checked his pulse ox last night was in the high 90s, today he felt continued sensation of \"feeling off\" and checked his pulse ox and found it to be in the high 70s.  Patient denies true dyspnea, chest pain.  Patient does states something similar happened with a plant in his house, he remove the plant his oxygen levels improved.  Patient states he took a poinsettia out of his house today believing this may be the source.  He denies fevers or chills, cold symptoms, abdominal pain, nausea or vomiting.  Patient is a dialysis patient had a full session yesterday.  Patient denies known sick contacts, states his spouse left for work today, he is uncertain if she is symptomatic either.  Patient reports propane heater in his house.        Prior to Admission Medications   Prescriptions Last Dose Informant Patient Reported? Taking?   Glucagon, rDNA, (Glucagon Emergency) 1 MG KIT   No No   Sig: INJECT 1MG SUBCUTANEOUSLY ONCE AS NEEDED FOR LOW BLOOD SUGAR FOR UP TO 1 DOSE   Insulin Syringe-Needle U-100 31G X 15/64\" 0.5 ML MISC   No No   Sig: Use 3 (three) times a day   NIFEdipine (PROCARDIA XL) 30 mg 24 hr tablet 1/6/2024  No Yes   Sig: Take 4 tablets (120 mg total) by mouth daily   aspirin 81 mg chewable tablet 1/6/2024 Self Yes Yes   Sig: Chew 81 mg every morning    atorvastatin (LIPITOR) 80 mg tablet 1/6/2024  No Yes   Sig: Take 1 tablet (80 mg total) by mouth daily   azaTHIOprine (IMURAN) 50 mg tablet 1/6/2024  No Yes   Sig: Take 1 tablet (50 mg total) by mouth daily   carvedilol (COREG) 6.25 mg tablet 1/6/2024  No Yes   Sig: Take 1 tablet (6.25 mg total) by mouth 2 (two) times a day with meals "   cholecalciferol (VITAMIN D3) 1,000 units tablet Not Taking  No No   Sig: Take 5 tablets (5,000 Units total) by mouth daily for 14 days   Patient not taking: Reported on 2024   cinacalcet (SENSIPAR) 30 mg tablet 2024  No Yes   Sig: Take 1 tablet (30 mg total) by mouth daily   glucose blood (Contour Next Test) test strip   No No   Sig: Use as instructed 6 times daily   insulin aspart (NovoLOG) 100 units/mL injection   No No   Sig: 10 Units by Subcutaneous Insulin Pump route 3 (three) times a day   Patient taking differently: 8 Units by Subcutaneous Insulin Pump route 2 (two) times a day as needed   insulin glargine (Semglee) 100 units/mL subcutaneous injection 2024  No Yes   Sig: Inject 10 Units under the skin every 12 (twelve) hours   isosorbide mononitrate (IMDUR) 30 mg 24 hr tablet 2024  No Yes   Sig: Take 1 tablet (30 mg total) by mouth daily   metoprolol succinate (TOPROL-XL) 50 mg 24 hr tablet 2024  No Yes   Sig: Take 1 tablet (50 mg total) by mouth daily   multivitamin (THERAGRAN) TABS 2024  Yes Yes   Sig: Take 1 tablet by mouth daily   pantoprazole (PROTONIX) 40 mg tablet 2024  No Yes   Sig: Take 1 tablet (40 mg total) by mouth daily   predniSONE 5 mg tablet 2024  No Yes   Sig: Take 1 tablet (5 mg total) by mouth daily   tacrolimus (PROGRAF) 1 mg capsule 2024  No Yes   Si caps in the morning and 1 cap in the evening      Facility-Administered Medications: None       Past Medical History:   Diagnosis Date    Bacteremia 2018    CAD (coronary artery disease)     s/p MARC to LCx, pLAD 2018    Cardiac arrest (HCC)     Chronic kidney disease     Diabetes mellitus type 1 (HCC)     Dialysis patient (HCC)     Access in right chest    GI bleed     Hyperlipidemia     Hypertension     Infection at site of external fixator pin (HCC)     MI (myocardial infarction) (HCC)     Myocardial infarction (HCC)     Pneumonia     Last Assessed [id number]    Renal failure     Renal  transplant, status post 07/21/2007       Past Surgical History:   Procedure Laterality Date    AMPUTATION Right 02/2019    aboce knee    ANKLE FRACTURE SURGERY      ANKLE HARDWARE REMOVAL Right 7/31/2017    Procedure: REMOVAL HARDWARE ANKLE;  Surgeon: Alvin Leon MD;  Location: [facility];  Service: Orthopedics    ANKLE HARDWARE REMOVAL Right 8/17/2017    Procedure: TIBIA FAILED HARDWARE REMOVAL;  Surgeon: Alvin Leon MD;  Location: [facility];  Service: Orthopedics    CLOSED REDUCTION ANKLE Right 7/3/2017    Procedure: CLOSED REDUCTION DISTAL TIB-FIB AND CASTING VS;  Surgeon: Alvin Leon MD;  Location: [facility];  Service: Orthopedics    CORONARY ANGIOPLASTY WITH STENT PLACEMENT  02/2018    CAD s/p MARC to LCx, pLAD    ESOPHAGOGASTRODUODENOSCOPY N/A 12/20/2018    Procedure: ESOPHAGOGASTRODUODENOSCOPY (EGD) in ICU;  Surgeon: Galileo Wise IV, MD;  Location: [facility];  Service: Gastroenterology    EYE SURGERY      FRACTURE SURGERY      ORIF Rt Ankle    GLUTAMIC ACID DECARBOXYLASE (HISTORICAL)      IR AV FISTULAGRAM/GRAFTOGRAM  4/16/2020    IR AV FISTULAGRAM/GRAFTOGRAM  6/23/2023    IR PICC LINE  8/20/2018    IR TUNNELED DIALYSIS CATHETER CHECK/CHANGE/REPOSITION/ANGIOPLASTY  1/8/2020    IR TUNNELED DIALYSIS CATHETER PLACEMENT  10/21/2019    IR TUNNELED DIALYSIS CATHETER REMOVAL  5/29/2020    IR VENOUS LINE REMOVAL  10/5/2018    LEG AMPUTATION Right 02/01/2019    Above the knee    NEPHRECTOMY TRANSPLANTED ORGAN      [place] ARTERIOVENOUS ANASTOMOSIS OPEN DIRECT Right 2/11/2020    Procedure: CREATION FISTULA ARTERIOVENOUS (AV) right upper extremity;  Surgeon: Carlos Medina MD;  Location:  MAIN OR;  Service: Vascular    KY OPEN TREATMENT FRACTURE DISTAL TIBIA FIBULA Right 7/3/2017    Procedure: OPEN REDUCTION W/ INTERNAL FIXATION (ORIF);  Surgeon: Alvin Leon MD;  Location: [facility];  Service: Orthopedics    TOE AMPUTATION Right 10/27/2016    Procedure: AMPUTATION TOE;  Surgeon: Krishna Ruiz DPM;  Location: [facility];  Service:        Family History   Problem Relation Age of Onset    Diabetes Brother     Coronary artery disease Mother     No Known Problems Father      I have reviewed and agree with the history as documented.    E-Cigarette/Vaping    E-Cigarette Use Never User      E-Cigarette/Vaping Substances    Nicotine No     THC No     CBD No     Flavoring No     Other No     Unknown No      Social History     Tobacco Use    Smoking status: Never    Smokeless tobacco: Never   Vaping Use    Vaping status: Never Used   Substance Use Topics    Alcohol use: Not Currently     Alcohol/week: 0.0 standard drinks of alcohol    Drug use: Never       Review of Systems   Constitutional:  Negative for activity change, appetite change, chills and fever.   Respiratory:  Negative for cough and shortness of breath.    Cardiovascular:  Negative for chest pain and leg swelling.   Gastrointestinal:  Negative for abdominal pain, nausea and vomiting.   Neurological:  Negative for headaches.   All other systems reviewed and are negative.      Physical Exam  Physical Exam  Vitals reviewed.   Constitutional:       General: He is not in acute distress.     Appearance: He is well-developed. He is not toxic-appearing.      Comments: No respiratory distress, no conversational dyspnea   HENT:      Head: Normocephalic and atraumatic.      Right Ear: External ear normal.      Left Ear: External ear normal.   Eyes:      General:         Right eye: No discharge.         Left eye: No discharge.   Cardiovascular:      Rate and Rhythm: Normal rate and regular rhythm.   Pulmonary:      Effort: Pulmonary effort is normal. No tachypnea or bradypnea.      Breath sounds: Examination of the right-lower field reveals decreased breath sounds. Examination of the left-lower field reveals decreased breath sounds. Decreased breath sounds present. No wheezing, rhonchi or rales.   Musculoskeletal:         General: Deformity (Right lower extremity amputation) present.  No swelling.      Left lower leg: No edema.   Skin:     General: Skin is warm.      Coloration: Skin is not jaundiced or pale.   Neurological:      General: No focal deficit present.      Mental Status: He is alert.         Vital Signs  ED Triage Vitals   Temperature Pulse Respirations Blood Pressure SpO2   01/07/24 1413 01/07/24 1001 01/07/24 1001 01/07/24 1001 01/07/24 1001   98.7 °F (37.1 °C) 89 20 113/60 92 %      Temp Source Heart Rate Source Patient Position - Orthostatic VS BP Location FiO2 (%)   01/07/24 1413 01/07/24 1001 01/07/24 1001 01/07/24 1001 --   Oral Monitor Lying Left arm       Pain Score       01/07/24 1134       No Pain           Vitals:    01/07/24 1330 01/07/24 1413 01/07/24 1503 01/07/24 1541   BP: 120/61 120/66 121/66 121/65   Pulse: 82 85 88 84   Patient Position - Orthostatic VS:  Lying           Visual Acuity      ED Medications  Medications   heparin (porcine) 25,000 units in 0.45% NaCl 250 mL infusion (premix) (12 Units/kg/hr × 45 kg (Order-Specific) Intravenous New Bag 1/7/24 1223)   aspirin chewable tablet 81 mg (has no administration in time range)   atorvastatin (LIPITOR) tablet 80 mg (80 mg Oral Given 1/7/24 1552)   azaTHIOprine (IMURAN) tablet 50 mg (50 mg Oral Given 1/7/24 1552)   carvedilol (COREG) tablet 6.25 mg (has no administration in time range)   isosorbide mononitrate (IMDUR) 24 hr tablet 30 mg (has no administration in time range)   metoprolol succinate (TOPROL-XL) 24 hr tablet 50 mg (50 mg Oral Given 1/7/24 1601)   NIFEdipine (PROCARDIA XL) 24 hr tablet 120 mg (has no administration in time range)   pantoprazole (PROTONIX) EC tablet 40 mg (has no administration in time range)   predniSONE tablet 5 mg (5 mg Oral Given 1/7/24 1552)   tacrolimus (PROGRAF) capsule 2 mg (has no administration in time range)   cinacalcet (SENSIPAR) tablet 30 mg (has no administration in time range)   insulin glargine (LANTUS) subcutaneous injection 12 Units 0.12 mL (has no administration in  time range)   bumetanide (BUMEX) injection 4 mg (4 mg Intravenous Not Given 1/7/24 1552)   insulin lispro (HumaLOG) 100 units/mL subcutaneous injection 1-6 Units ( Subcutaneous Not Given 1/7/24 1605)   insulin lispro (HumaLOG) 100 units/mL subcutaneous injection 1-6 Units (has no administration in time range)   tacrolimus (PROGRAF) capsule 1 mg (has no administration in time range)   cefTRIAXone (ROCEPHIN) IVPB (premix in dextrose) 1,000 mg 50 mL (has no administration in time range)   aspirin chewable tablet 324 mg (324 mg Oral Given 1/7/24 1106)   cefepime (MAXIPIME) IVPB (premix in dextrose) 1,000 mg 50 mL (0 mg Intravenous Stopped 1/7/24 1154)   vancomycin (VANCOCIN) IVPB (premix in dextrose) 750 mg 150 mL (750 mg Intravenous New Bag 1/7/24 1221)   heparin (porcine) injection 2,700 Units (2,700 Units Intravenous Given 1/7/24 1222)       Diagnostic Studies  Results Reviewed       Procedure Component Value Units Date/Time    APTT [[id number]]     Lab Status: No result Specimen: Blood     Hemoglobin A1C w/ EAG Estimation [[id number]] Collected: 01/07/24 1015    Lab Status: In process Specimen: Blood Updated: 01/07/24 1534    HS Troponin I 4hr [[id number]]  (Abnormal) Collected: 01/07/24 1433    Lab Status: Final result Specimen: Blood from Arm, Left Updated: 01/07/24 1503     hs TnI 4hr 11,919 ng/L      Delta 4hr hsTnI 811 ng/L     HS Troponin I 2hr [[id number]]  (Abnormal) Collected: 01/07/24 1220    Lab Status: Final result Specimen: Blood from Arm, Left Updated: 01/07/24 1253     hs TnI 2hr 11,321 ng/L      Delta 2hr hsTnI 213 ng/L     B-Type Natriuretic Peptide(BNP) [[id number]]  (Abnormal) Collected: 01/07/24 1015    Lab Status: Final result Specimen: Blood from Arm, Left Updated: 01/07/24 1219     BNP 1,934 pg/mL     Procalcitonin [[id number]]  (Abnormal) Collected: 01/07/24 1109    Lab Status: Final result Specimen: Blood from Arm, Left Updated: 01/07/24 1148     Procalcitonin 0.44 ng/ml     APTT six (6) hours  after Heparin bolus/drip initiation or dosing change [[id number]]  (Normal) Collected: 01/07/24 1015    Lab Status: Final result Specimen: Blood Updated: 01/07/24 1144     PTT 37 seconds     Protime-INR [[id number]]  (Normal) Collected: 01/07/24 1015    Lab Status: Final result Specimen: Blood Updated: 01/07/24 1144     Protime 13.1 seconds      INR 1.00    Blood culture #2 [[id number]] Collected: 01/07/24 1109    Lab Status: In process Specimen: Blood from Arm, Left Updated: 01/07/24 1121    Blood culture #1 [[id number]] Collected: 01/07/24 1119    Lab Status: In process Specimen: Blood from Arm, Left Updated: 01/07/24 1121    FLU/RSV/COVID - if FLU/RSV clinically relevant [[id number]]  (Normal) Collected: 01/07/24 1015    Lab Status: Final result Specimen: Nares from Nose Updated: 01/07/24 1103     SARS-CoV-2 Negative     INFLUENZA A PCR Negative     INFLUENZA B PCR Negative     RSV PCR Negative    Narrative:      FOR PEDIATRIC PATIENTS - copy/paste COVID Guidelines URL to browser: [email or web address]    SARS-CoV-2 assay is a Nucleic Acid Amplification assay intended for the  qualitative detection of nucleic acid from SARS-CoV-2 in nasopharyngeal  swabs. Results are for the presumptive identification of SARS-CoV-2 RNA.    Positive results are indicative of infection with SARS-CoV-2, the virus  causing COVID-19, but do not rule out bacterial infection or co-infection  with other viruses. Laboratories within the [place] and its  territories are required to report all positive results to the appropriate  public health authorities. Negative results do not preclude SARS-CoV-2  infection and should not be used as the sole basis for treatment or other  patient management decisions. Negative results must be combined with  clinical observations, patient history, and epidemiological information.  This test has not been FDA cleared or approved.    This test has been  authorized by FDA under an Emergency Use Authorization  (EUA). This test is only authorized for the duration of time the  declaration that circumstances exist justifying the authorization of the  emergency use of an in vitro diagnostic tests for detection of SARS-CoV-2  virus and/or diagnosis of COVID-19 infection under section 564(b)(1) of  the Act, 21 U.S.C. 360bbb-3(b)(1), unless the authorization is terminated  or revoked sooner. The test has been validated but independent review by FDA  and CLIA is pending.    Test performed using [employer] GeneXpert: This RT-PCR assay targets N2,  a region unique to SARS-CoV-2. A conserved region in the E-gene was chosen  for pan-Sarbecovirus detection which includes SARS-CoV-2.    According to CMS-2020-01-R, this platform meets the definition of high-throughput technology.    HS Troponin 0hr (reflex protocol) [[id number]]  (Abnormal) Collected: 01/07/24 1015    Lab Status: Final result Specimen: Blood from Arm, Left Updated: 01/07/24 1048     hs TnI 0hr 11,108 ng/L     Lactic acid, plasma (w/reflex if result > 2.0) [[id number]]  (Normal) Collected: 01/07/24 1015    Lab Status: Final result Specimen: Blood from Arm, Left Updated: 01/07/24 1043     LACTIC ACID 1.3 mmol/L     Narrative:      Result may be elevated if tourniquet was used during collection.    Comprehensive metabolic panel [[id number]]  (Abnormal) Collected: 01/07/24 1015    Lab Status: Final result Specimen: Blood from Arm, Left Updated: 01/07/24 1043     Sodium 135 mmol/L      Potassium 3.5 mmol/L      Chloride 96 mmol/L      CO2 27 mmol/L      ANION GAP 12 mmol/L      BUN 30 mg/dL      Creatinine 5.36 mg/dL      Glucose 119 mg/dL      Calcium 9.3 mg/dL      AST 27 U/L      ALT 19 U/L      Alkaline Phosphatase 98 U/L      Total Protein 7.0 g/dL      Albumin 3.9 g/dL      Total Bilirubin 0.65 mg/dL      eGFR 11 ml/min/1.73sq m     Narrative:      National Kidney Disease Foundation guidelines for Chronic Kidney Disease  (CKD):     Stage 1 with normal or high GFR (GFR > 90 mL/min/1.73 square meters)    Stage 2 Mild CKD (GFR = 60-89 mL/min/1.73 square meters)    Stage 3A Moderate CKD (GFR = 45-59 mL/min/1.73 square meters)    Stage 3B Moderate CKD (GFR = 30-44 mL/min/1.73 square meters)    Stage 4 Severe CKD (GFR = 15-29 mL/min/1.73 square meters)    Stage 5 End Stage CKD (GFR <15 mL/min/1.73 square meters)  Note: GFR calculation is accurate only with a steady state creatinine    D-Dimer [[id number]]  (Normal) Collected: 01/07/24 1015    Lab Status: Final result Specimen: Blood from Arm, Left Updated: 01/07/24 1036     D-Dimer, Quant 0.44 ug/ml FEU     Narrative:      In the evaluation for possible pulmonary embolism, in the appropriate (Well's Score of 4 or less) patient, the age adjusted d-dimer cutoff for this patient can be calculated as:    Age x 0.01 (in ug/mL) for Age-adjusted D-dimer exclusion threshold for a patient over 50 years.    Blood gas, venous [[id number]]  (Abnormal) Collected: 01/07/24 1015    Lab Status: Final result Specimen: Blood from Arm, Left Updated: 01/07/24 1029     pH, Mio 7.387     pCO2, Mio 44.0 mm Hg      pO2, Mio 22.0 mm Hg      HCO3, Mio 25.9 mmol/L      Base Excess, Mio 0.6 mmol/L      O2 Content, Mio 6.9 ml/dL      O2 HGB, VENOUS 39.8 %     Carboxyhemoglobin [[id number]]  (Abnormal) Collected: 01/07/24 1018    Lab Status: Final result Specimen: Blood from Arm, Left Updated: 01/07/24 1026     Carbon Monoxide, Blood 2.6 %     Narrative:      Therapeutic levels (1 mg/mL and 2 mg/mL) of hydroxocobalamin may interfere with the fCOHb and fMetHb where it may cause lower than expected values  Normal Carboxyhemoglobin range for nonsmokers is <1.5%   Normal Carboxyhemoglobin range for smokers is 1.5% to 5.1%     CBC and differential [[id number]]  (Abnormal) Collected: 01/07/24 1015    Lab Status: Final result Specimen: Blood from Arm, Left Updated: 01/07/24 1025     WBC 13.58 Thousand/uL      RBC 3.69 Million/uL       Hemoglobin 12.0 g/dL      Hematocrit 37.9 %       fL      MCH 32.5 pg      MCHC 31.7 g/dL      RDW 15.4 %      MPV 8.7 fL      Platelets 250 Thousands/uL      nRBC 0 /100 WBCs      Neutrophils Relative 81 %      Immat GRANS % 0 %      Lymphocytes Relative 10 %      Monocytes Relative 8 %      Eosinophils Relative 1 %      Basophils Relative 0 %      Neutrophils Absolute 10.83 Thousands/µL      Immature Grans Absolute 0.05 Thousand/uL      Lymphocytes Absolute 1.40 Thousands/µL      Monocytes Absolute 1.12 Thousand/µL      Eosinophils Absolute 0.14 Thousand/µL      Basophils Absolute 0.04 Thousands/µL                    CT chest without contrast   Final Result by Galileo Del Valle MD (01/07 8287)      Bilateral multi lobar airspace consolidation, groundglass opacities, smooth septal thickening and small bilateral pleural effusions attributed to pulmonary vascular congestion. Underlying superimposed pneumonia is not excluded. Correlate with clinical    findings.      Small cysts in the partially visualized pancreas measuring up to 1.6 cm grossly unchanged since January 2022. Advise nonemergent outpatient abdomen MRI/MRCP follow-up to assess 2-year stability.      Additional chronic findings and negatives as above.      This study demonstrates a significant  finding and was documented as such in [facility] for liaison and referring practitioner notification.      This study demonstrates a finding requiring imaging follow-up and was documented as such in Epic.         Workstation performed: [id number]         XR chest 1 view portable   Final Result by Giacomo Mancera MD (01/07 2450)      Pneumonia and pulmonary edema.               Workstation performed: [id number]                    Procedures  CriticalCare Time    Date/Time: 1/7/2024 4:11 PM    Performed by: Hannah Conte DO  Authorized by: Hannah Conte DO    Critical care provider statement:     Critical care time (minutes):  30     Critical care time was exclusive of:  Separately billable procedures and treating other patients    Critical care was necessary to treat or prevent imminent or life-threatening deterioration of the following conditions:  Cardiac failure and respiratory failure    Critical care was time spent personally by me on the following activities:  Obtaining history from patient or surrogate, development of treatment plan with patient or surrogate, discussions with consultants, evaluation of patient's response to treatment, examination of patient, ordering and performing treatments and interventions, ordering and review of laboratory studies, ordering and review of radiographic studies, re-evaluation of patient's condition and review of old charts    I assumed direction of critical care for this patient from another provider in my specialty: no             ED Course  ED Course as of 01/07/24 1611   Sun Jan 07, 2024   1028 Carbon Monoxide, Blood(!): 2.6  Higher than ref range but not significantly elevated   1032 WBC(!): 13.58  leukocytosis   1037 Procedure Note: EKG  Date/Time: 01/07/24 10:37 AM   Interpreted by: Hannah Conte  Indications / Diagnosis: hypoxia  ECG reviewed by me, the ED Provider: yes   The EKG demonstrates:  Rhythm: normal sinus  Intervals: normal intervals  Axis: normal axis  QRS/Blocks: normal QRS  ST Changes: inferior-lateral changes to ST-T, some leads seen on previous EKGs however worsened       1045 LACTIC ACID: 1.3  negative   1045 D-Dimer, Quant: 0.44  negative   1045 Comprehensive metabolic panel(!)  Known renal dysfunction, otherwise within normal   1050 hs TnI 0hr(!): 11,108  Known dialysis but previously within normal   1058 XR chest 1 view portable  On my interpretation, bl/ infiltrate vs effusions or infiltrate with effusion   1104 FLU/RSV/COVID - if FLU/RSV clinically relevant   1115 Updated pt to results of EKG, trop, CXR, persists with no chest pain now or over weekend. Coughing episode in  room, states from ASA.    1120 Discussed with Dr. Carcamo with cardiology, EKG does appear ischemic although with recent normal echo/EF may be more type 2 injury than true MI. If infectious and demand related would still start heparin but should be able to keep here and repeat echo, cards eval.    1148 Procalcitonin(!): 0.44  elevated   1219 BNP(!): 1,934  Elevated, however in setting of known renal dysfunction   1242 Repeat EKG shows persistent inferior-lateral changes although possibly improved from original, DENNIS   1256 Delta 2hr hsTnI(!): 213   1337 Slim accepts med surge/tele                                SBIRT 20yo+      Flowsheet Row Most Recent Value   Initial Alcohol Screen: US AUDIT-C     1. How often do you have a drink containing alcohol? 0 Filed at: 01/07/2024 1001   2. How many drinks containing alcohol do you have on a typical day you are drinking?  0 Filed at: 01/07/2024 1001   3a. Male UNDER 65: How often do you have five or more drinks on one occasion? 0 Filed at: 01/07/2024 1001   3b. FEMALE Any Age, or MALE 65+: How often do you have 4 or more drinks on one occassion? 0 Filed at: 01/07/2024 1001   Audit-C Score 0 Filed at: 01/07/2024 1001   ADRIEL: How many times in the past year have you...    Used an illegal drug or used a prescription medication for non-medical reasons? Never Filed at: 01/07/2024 1001                      Medical Decision Making  53-year-old male presents for evaluation of episode of hypoxia.  Patient was found to be saturating in the 70s on room air, and denies actual dyspnea or chest pain.  Patient was placed on supplemental nasal cannula via EMS, he did not receive breathing treatments.  Patient's oxygen on my first encounter on 6 L was at 82%, he was switched to a nonrebreather with improvement into the 90s.  Well saturating in the low 80s, he had no conversational dyspnea, is not tachycardic.  Patient appears to be well-perfused.  Uncertain etiology however will obtain a  viral panel swab, carboxyhemoglobin level given patient uses propane heat at home.  Will assess patient's medications for possible methemoglobinemia.  Will additionally obtain a CBC for anemia, chest x-ray for evidence of pneumonia, pneumothorax.  Will additionally assess for signs of endorgan damage with hypoxia.    Amount and/or Complexity of Data Reviewed  Labs: ordered. Decision-making details documented in ED Course.  Radiology: ordered. Decision-making details documented in ED Course.    Risk  OTC drugs.  Prescription drug management.  Decision regarding hospitalization.             Disposition  Final diagnoses:   Acute respiratory failure with hypoxia (HCC)   Multifocal pneumonia   Elevated troponin   ESRD (end stage renal disease) on dialysis (HCC)     Time reflects when diagnosis was documented in both MDM as applicable and the Disposition within this note       Time User Action Codes Description Comment    1/7/2024  1:38 PM Hannah Conte Add [J96.01] Acute respiratory failure with hypoxia (HCC)     1/7/2024  1:38 PM Hannah Conte Add [J18.9] Multifocal pneumonia     1/7/2024  1:38 PM Hannah Conte Add [R79.89] Elevated troponin     1/7/2024  1:38 PM Nineveh, Hannah Add [N18.6,  Z99.2] ESRD (end stage renal disease) on dialysis (HCC)     1/7/2024  3:19 PM Rolanda Martinez Add [I50.32] Chronic diastolic congestive heart failure (HCC)     1/7/2024  3:22 PM Rolanda Martinez Add [I50.33] Acute on chronic diastolic CHF (congestive heart failure) (HCC)     1/7/2024  3:22 PM Rolanda Martinez Remove [I50.33] Acute on chronic diastolic CHF (congestive heart failure) (HCC)     1/7/2024  3:22 PM Rolanda Martinez Add [R94.31] Abnormal EKG           ED Disposition       ED Disposition   Admit    Condition   Stable    Date/Time   Sun Jan 7, 2024 8473    Comment   Case was discussed with MELISSA and the patient's admission status was agreed to be Admission Status: inpatient status to the service of Dr. Licea .                Follow-up Information    None         Current Discharge Medication List        CONTINUE these medications which have NOT CHANGED    Details   aspirin 81 mg chewable tablet Chew 81 mg every morning       atorvastatin (LIPITOR) 80 mg tablet Take 1 tablet (80 mg total) by mouth daily  Qty: 90 tablet, Refills: 3    Associated Diagnoses: Hypertension, unspecified type      azaTHIOprine (IMURAN) 50 mg tablet Take 1 tablet (50 mg total) by mouth daily  Qty: 90 tablet, Refills: 3    Associated Diagnoses: Kidney transplant status      carvedilol (COREG) 6.25 mg tablet Take 1 tablet (6.25 mg total) by mouth 2 (two) times a day with meals  Qty: 180 tablet, Refills: 3    Associated Diagnoses: Coronary artery disease involving native coronary artery of native heart without angina pectoris      cinacalcet (SENSIPAR) 30 mg tablet Take 1 tablet (30 mg total) by mouth daily  Qty: 90 tablet, Refills: 3    Associated Diagnoses: Secondary hyperparathyroidism of renal origin ([facility])      insulin glargine (Semglee) 100 units/mL subcutaneous injection Inject 10 Units under the skin every 12 (twelve) hours  Qty: 20 mL, Refills: 3    Associated Diagnoses: Type 1 diabetes mellitus with end-stage renal disease (ESRD) ([facility])      isosorbide mononitrate (IMDUR) 30 mg 24 hr tablet Take 1 tablet (30 mg total) by mouth daily  Qty: 90 tablet, Refills: 0    Associated Diagnoses: Coronary artery disease involving native coronary artery of native heart without angina pectoris      metoprolol succinate (TOPROL-XL) 50 mg 24 hr tablet Take 1 tablet (50 mg total) by mouth daily  Qty: 90 tablet, Refills: 0    Associated Diagnoses: Coronary artery disease involving native coronary artery of native heart without angina pectoris      multivitamin (THERAGRAN) TABS Take 1 tablet by mouth daily      NIFEdipine (PROCARDIA XL) 30 mg 24 hr tablet Take 4 tablets (120 mg total) by mouth daily  Qty: 360 tablet, Refills: 3    Associated Diagnoses: Hypertensive  "renal disease      pantoprazole (PROTONIX) 40 mg tablet Take 1 tablet (40 mg total) by mouth daily  Qty: 90 tablet, Refills: 3    Associated Diagnoses: Gastrointestinal hemorrhage with hematemesis      predniSONE 5 mg tablet Take 1 tablet (5 mg total) by mouth daily  Qty: 90 tablet, Refills: 3    Associated Diagnoses: Acute kidney injury (HCC)      tacrolimus (PROGRAF) 1 mg capsule 2 caps in the morning and 1 cap in the evening  Qty: 270 capsule, Refills: 3    Associated Diagnoses: Renal transplant, status post      cholecalciferol (VITAMIN D3) 1,000 units tablet Take 5 tablets (5,000 Units total) by mouth daily for 14 days  Qty: 70 tablet, Refills: 0    Associated Diagnoses: Pneumonia due to COVID-19 virus      Glucagon, rDNA, (Glucagon Emergency) 1 MG KIT INJECT 1MG SUBCUTANEOUSLY ONCE AS NEEDED FOR LOW BLOOD SUGAR FOR UP TO 1 DOSE  Qty: 2 kit, Refills: 0    Associated Diagnoses: Type 1 diabetes mellitus with diabetic chronic kidney disease, unspecified CKD stage ([facility])      glucose blood (Contour Next Test) test strip Use as instructed 6 times daily  Qty: 600 strip, Refills: 5    Associated Diagnoses: Type 1 diabetes mellitus with chronic kidney disease on chronic dialysis ([facility])      insulin aspart (NovoLOG) 100 units/mL injection 10 Units by Subcutaneous Insulin Pump route 3 (three) times a day  Qty: 10 mL, Refills: 3    Associated Diagnoses: Type 1 diabetes mellitus with end-stage renal disease (ESRD) ([facility])      Insulin Syringe-Needle U-100 31G X 15/64\" 0.5 ML MISC Use 3 (three) times a day  Qty: 100 each, Refills: 4    Associated Diagnoses: Type 1 diabetes mellitus with chronic kidney disease on chronic dialysis ([facility])             No discharge procedures on file.    PDMP Review       None            ED Provider  Electronically Signed by             Hannah Conte DO  01/07/24 1611    "

## 2024-01-07 NOTE — ASSESSMENT & PLAN NOTE
Trop 11,108 > 11,321  EKG with ST & T wave abnormalities in lateral leads  Remains chest pain free. SOB resolved with supplemental oxygen  Suspected type II MI secondary to hypoxia, volume overload as EKG with new ischemic changes   Case reviewed with cards by ED physician - recs cards consult, echo, heparin gtt, suspected secondary to demand  Continue hep gtt  Echo ordered  Telemetry   Formal cardiology consult placed

## 2024-01-07 NOTE — ASSESSMENT & PLAN NOTE
Lab Results   Component Value Date    EGFR 11 01/07/2024    EGFR 17 04/27/2023    EGFR 8 10/12/2022    CREATININE 5.36 (H) 01/07/2024    CREATININE 3.68 (H) 04/27/2023    CREATININE 6.62 (H) 10/12/2022     Hx ESRD with R AV fistula   HD schedule: T,TH,S  Last session 1/6/24  Daily weights, I&Os, fluid restriction  Nephrology consult appreciated   D/w nephro oncall, planning for session tomorrow with acute fluid overlaod

## 2024-01-07 NOTE — ASSESSMENT & PLAN NOTE
Wt Readings from Last 3 Encounters:   01/07/24 49.1 kg (108 lb 3.9 oz)   08/21/23 51.4 kg (113 lb 6.4 oz)   08/07/23 54.4 kg (120 lb)     Presenting with volume overload evidenced on imaging, elevated BNP and clinically  Recent echo with normal EF, repeat echo requesting given possible type II MI vs nonischemic myocardial injury   Give IV Bumex 4mg x 1  For extra HD session tomorrow   Daily weights,I&Os, Na/fluid restriction

## 2024-01-07 NOTE — ASSESSMENT & PLAN NOTE
"Lab Results   Component Value Date    HGBA1C 7.3 (H) 05/17/2023       No results for input(s): \"POCGLU\" in the last 72 hours.    Blood Sugar Average: Last 72 hrs:    Update A1c  Continue lantus 12 units q12  Take Novolog 8 units with meals as needed. Hold scheduled, cover with SSI alone for now     "

## 2024-01-07 NOTE — ASSESSMENT & PLAN NOTE
Presents with SOB x 1 day. Associated orthopnea and hypoxia at home. Initially on 6L NC with SpO2 low-80s, after brief period on NRB was weaned to 2-3L NC.   Secondary to pulmonary edema, possible superimposed bacterial pneumonia   Did have mildly elevated carbon monoxide level being treated with supplementation oxygen. VBG with nl pH  D/w nephrology on call - plan to give IV Bumex 4mg x 1, arranging for HD session tomorrow   With low DRIP score, can c/w Rocephin 1g qd for now   Stable on 3L NC, wean as able  Check RP2 panel

## 2024-01-07 NOTE — ASSESSMENT & PLAN NOTE
Hypoxia + new infiltrate on imaging  WBC 13.58. no other SIRS criteria  W/ low drip score, c/w Rocephin 1g q24 as above   Check urinary antigens  Trend WBCs/fever curve/PCT  Follow-up on blood cultures

## 2024-01-08 ENCOUNTER — APPOINTMENT (INPATIENT)
Dept: NON INVASIVE DIAGNOSTICS | Facility: HOSPITAL | Age: 54
DRG: 280 | End: 2024-01-08
Payer: MEDICARE

## 2024-01-08 ENCOUNTER — TELEPHONE (OUTPATIENT)
Dept: INTERVENTIONAL RADIOLOGY/VASCULAR | Facility: HOSPITAL | Age: 54
End: 2024-01-08

## 2024-01-08 ENCOUNTER — APPOINTMENT (INPATIENT)
Dept: RADIOLOGY | Facility: HOSPITAL | Age: 54
DRG: 853 | End: 2024-01-08
Payer: MEDICARE

## 2024-01-08 ENCOUNTER — APPOINTMENT (INPATIENT)
Dept: RADIOLOGY | Facility: HOSPITAL | Age: 54
DRG: 280 | End: 2024-01-08
Payer: MEDICARE

## 2024-01-08 ENCOUNTER — HOSPITAL ENCOUNTER (INPATIENT)
Facility: HOSPITAL | Age: 54
LOS: 12 days | Discharge: HOME | DRG: 853 | End: 2024-01-20
Attending: INTERNAL MEDICINE | Admitting: INTERNAL MEDICINE
Payer: MEDICARE

## 2024-01-08 ENCOUNTER — APPOINTMENT (INPATIENT)
Dept: RADIOLOGY | Facility: HOSPITAL | Age: 54
DRG: 853 | End: 2024-01-08
Attending: INTERNAL MEDICINE
Payer: MEDICARE

## 2024-01-08 ENCOUNTER — APPOINTMENT (OUTPATIENT)
Dept: PHYSICAL THERAPY | Facility: CLINIC | Age: 54
End: 2024-01-08
Payer: MEDICARE

## 2024-01-08 VITALS
TEMPERATURE: 98.1 F | RESPIRATION RATE: 20 BRPM | WEIGHT: 110.23 LBS | BODY MASS INDEX: 18.82 KG/M2 | HEART RATE: 77 BPM | SYSTOLIC BLOOD PRESSURE: 96 MMHG | DIASTOLIC BLOOD PRESSURE: 51 MMHG | HEIGHT: 64 IN | OXYGEN SATURATION: 95 %

## 2024-01-08 DIAGNOSIS — A41.9 SEVERE SEPSIS WITH SEPTIC SHOCK (HCC): ICD-10-CM

## 2024-01-08 DIAGNOSIS — K92.0 GASTROINTESTINAL HEMORRHAGE WITH HEMATEMESIS: ICD-10-CM

## 2024-01-08 DIAGNOSIS — N18.6 ESRD (END STAGE RENAL DISEASE) ON DIALYSIS (HCC): ICD-10-CM

## 2024-01-08 DIAGNOSIS — R94.31 ABNORMAL EKG: ICD-10-CM

## 2024-01-08 DIAGNOSIS — I25.10 CAD (CORONARY ARTERY DISEASE): ICD-10-CM

## 2024-01-08 DIAGNOSIS — I50.21 ACUTE SYSTOLIC HEART FAILURE (HCC): ICD-10-CM

## 2024-01-08 DIAGNOSIS — R57.0 CARDIOGENIC SHOCK (HCC): ICD-10-CM

## 2024-01-08 DIAGNOSIS — A41.9 SEPTIC SHOCK (HCC): ICD-10-CM

## 2024-01-08 DIAGNOSIS — Z99.2 ESRD (END STAGE RENAL DISEASE) ON DIALYSIS (HCC): ICD-10-CM

## 2024-01-08 DIAGNOSIS — I25.10 CORONARY ARTERY DISEASE: Primary | ICD-10-CM

## 2024-01-08 DIAGNOSIS — R79.89 ELEVATED TROPONIN: ICD-10-CM

## 2024-01-08 DIAGNOSIS — K92.2 GI BLEEDING: ICD-10-CM

## 2024-01-08 DIAGNOSIS — I48.91 NEW ONSET A-FIB (HCC): ICD-10-CM

## 2024-01-08 DIAGNOSIS — E10.22 TYPE 1 DIABETES MELLITUS WITH END-STAGE RENAL DISEASE (ESRD) (HCC): ICD-10-CM

## 2024-01-08 DIAGNOSIS — R65.21 SEVERE SEPSIS WITH SEPTIC SHOCK (HCC): ICD-10-CM

## 2024-01-08 DIAGNOSIS — N18.6 TYPE 1 DIABETES MELLITUS WITH END-STAGE RENAL DISEASE (ESRD) (HCC): ICD-10-CM

## 2024-01-08 DIAGNOSIS — I50.32 CHRONIC DIASTOLIC CONGESTIVE HEART FAILURE (HCC): ICD-10-CM

## 2024-01-08 DIAGNOSIS — N18.6 ESRD (END STAGE RENAL DISEASE) (HCC): ICD-10-CM

## 2024-01-08 DIAGNOSIS — R65.21 SEPTIC SHOCK (HCC): ICD-10-CM

## 2024-01-08 DIAGNOSIS — J96.21 ACUTE ON CHRONIC RESPIRATORY FAILURE WITH HYPOXIA (HCC): ICD-10-CM

## 2024-01-08 DIAGNOSIS — E10.9 TYPE 1 DIABETES MELLITUS ON INSULIN THERAPY (HCC): ICD-10-CM

## 2024-01-08 PROBLEM — T86.11 RENAL TRANSPLANT FAILURE AND REJECTION: Status: ACTIVE | Noted: 2024-01-08

## 2024-01-08 PROBLEM — I21.4 NSTEMI (NON-ST ELEVATED MYOCARDIAL INFARCTION) (HCC): Status: ACTIVE | Noted: 2017-09-23

## 2024-01-08 PROBLEM — T86.12 RENAL TRANSPLANT FAILURE AND REJECTION: Status: ACTIVE | Noted: 2024-01-08

## 2024-01-08 PROBLEM — Z89.619 HX OF AKA (ABOVE KNEE AMPUTATION) (HCC): Status: ACTIVE | Noted: 2024-01-08

## 2024-01-08 LAB
ANION GAP SERPL CALCULATED.3IONS-SCNC: 15 MMOL/L
ANION GAP SERPL CALCULATED.3IONS-SCNC: 17 MMOL/L
ANION GAP SERPL CALCULATED.3IONS-SCNC: 17 MMOL/L
ANISOCYTOSIS BLD QL SMEAR: PRESENT
AORTIC ROOT: 2.7 CM
APTT PPP: 73 SECONDS (ref 23–37)
APTT PPP: 78 SECONDS (ref 23–37)
B PARAP IS1001 DNA NPH QL NAA+NON-PROBE: NOT DETECTED
B PERT.PT PRMT NPH QL NAA+NON-PROBE: NOT DETECTED
BASE EX.OXY STD BLDV CALC-SCNC: 68.6 % (ref 60–80)
BASE EX.OXY STD BLDV CALC-SCNC: 89.4 % (ref 60–80)
BASE EXCESS BLDV CALC-SCNC: -8 MMOL/L
BASE EXCESS BLDV CALC-SCNC: -8.1 MMOL/L
BASOPHILS # BLD MANUAL: 0 THOUSAND/UL (ref 0–0.1)
BASOPHILS NFR MAR MANUAL: 0 % (ref 0–1)
BUN SERPL-MCNC: 47 MG/DL (ref 5–25)
BUN SERPL-MCNC: 64 MG/DL (ref 5–25)
BUN SERPL-MCNC: 67 MG/DL (ref 5–25)
C PNEUM DNA NPH QL NAA+NON-PROBE: NOT DETECTED
CA-I BLD-SCNC: 1.05 MMOL/L (ref 1.12–1.32)
CA-I BLD-SCNC: 1.08 MMOL/L (ref 1.12–1.32)
CALCIUM SERPL-MCNC: 7.8 MG/DL (ref 8.4–10.2)
CALCIUM SERPL-MCNC: 8.3 MG/DL (ref 8.4–10.2)
CALCIUM SERPL-MCNC: 8.9 MG/DL (ref 8.4–10.2)
CHLORIDE SERPL-SCNC: 92 MMOL/L (ref 96–108)
CHLORIDE SERPL-SCNC: 95 MMOL/L (ref 96–108)
CHLORIDE SERPL-SCNC: 97 MMOL/L (ref 96–108)
CO2 SERPL-SCNC: 15 MMOL/L (ref 21–32)
CO2 SERPL-SCNC: 19 MMOL/L (ref 21–32)
CO2 SERPL-SCNC: 23 MMOL/L (ref 21–32)
CREAT SERPL-MCNC: 6.67 MG/DL (ref 0.6–1.3)
CREAT SERPL-MCNC: 7.05 MG/DL (ref 0.6–1.3)
CREAT SERPL-MCNC: 7.21 MG/DL (ref 0.6–1.3)
E WAVE DECELERATION TIME: 135 MS
E/A RATIO: 1.74
EOSINOPHIL # BLD MANUAL: 0 THOUSAND/UL (ref 0–0.4)
EOSINOPHIL NFR BLD MANUAL: 0 % (ref 0–6)
ERYTHROCYTE [DISTWIDTH] IN BLOOD BY AUTOMATED COUNT: 15.9 % (ref 11.6–15.1)
FLUAV RNA NPH QL NAA+NON-PROBE: NOT DETECTED
FLUBV RNA NPH QL NAA+NON-PROBE: NOT DETECTED
FRACTIONAL SHORTENING: 19 (ref 28–44)
GAS + CO PNL BLDA: 2.9 % (ref 0–1.5)
GFR SERPL CREATININE-BSD FRML MDRD: 7 ML/MIN/1.73SQ M
GFR SERPL CREATININE-BSD FRML MDRD: 8 ML/MIN/1.73SQ M
GFR SERPL CREATININE-BSD FRML MDRD: 8 ML/MIN/1.73SQ M
GLUCOSE SERPL-MCNC: 146 MG/DL (ref 65–140)
GLUCOSE SERPL-MCNC: 183 MG/DL (ref 65–140)
GLUCOSE SERPL-MCNC: 209 MG/DL (ref 65–140)
GLUCOSE SERPL-MCNC: 241 MG/DL (ref 65–140)
GLUCOSE SERPL-MCNC: 298 MG/DL (ref 65–140)
GLUCOSE SERPL-MCNC: 346 MG/DL (ref 65–140)
GLUCOSE SERPL-MCNC: 471 MG/DL (ref 65–140)
HADV DNA NPH QL NAA+NON-PROBE: NOT DETECTED
HCO3 BLDV-SCNC: 16.3 MMOL/L (ref 24–30)
HCO3 BLDV-SCNC: 17.3 MMOL/L (ref 24–30)
HCOV 229E RNA NPH QL NAA+NON-PROBE: NOT DETECTED
HCOV HKU1 RNA NPH QL NAA+NON-PROBE: NOT DETECTED
HCOV NL63 RNA NPH QL NAA+NON-PROBE: NOT DETECTED
HCOV OC43 RNA NPH QL NAA+NON-PROBE: NOT DETECTED
HCT VFR BLD AUTO: 32.2 % (ref 36.5–49.3)
HGB BLD-MCNC: 10.3 G/DL (ref 12–17)
HMPV RNA NPH QL NAA+NON-PROBE: NOT DETECTED
HPIV1 RNA NPH QL NAA+NON-PROBE: NOT DETECTED
HPIV2 RNA NPH QL NAA+NON-PROBE: NOT DETECTED
HPIV3 RNA NPH QL NAA+NON-PROBE: NOT DETECTED
HPIV4 RNA NPH QL NAA+NON-PROBE: NOT DETECTED
INTERVENTRICULAR SEPTUM IN DIASTOLE (PARASTERNAL SHORT AXIS VIEW): 1.3 CM
INTERVENTRICULAR SEPTUM: 1.3 CM (ref 0.6–1.1)
LACTATE SERPL-SCNC: 1.5 MMOL/L (ref 0.5–2)
LEFT ATRIUM SIZE: 4 CM
LEFT INTERNAL DIMENSION IN SYSTOLE: 3.4 CM (ref 2.1–4)
LEFT VENTRICULAR INTERNAL DIMENSION IN DIASTOLE: 4.2 CM (ref 3.5–6)
LEFT VENTRICULAR POSTERIOR WALL IN END DIASTOLE: 1.2 CM
LEFT VENTRICULAR STROKE VOLUME: 32 ML
LVSV (TEICH): 32 ML
LYMPHOCYTES # BLD AUTO: 1.66 THOUSAND/UL (ref 0.6–4.47)
LYMPHOCYTES # BLD AUTO: 12 % (ref 14–44)
M PNEUMO DNA NPH QL NAA+NON-PROBE: NOT DETECTED
MAGNESIUM SERPL-MCNC: 2.2 MG/DL (ref 1.9–2.7)
MAGNESIUM SERPL-MCNC: 2.2 MG/DL (ref 1.9–2.7)
MAGNESIUM SERPL-MCNC: 2.3 MG/DL (ref 1.9–2.7)
MCH RBC QN AUTO: 32.8 PG (ref 26.8–34.3)
MCHC RBC AUTO-ENTMCNC: 32 G/DL (ref 31.4–37.4)
MCV RBC AUTO: 103 FL (ref 82–98)
MONOCYTES # BLD AUTO: 1.24 THOUSAND/UL (ref 0–1.22)
MONOCYTES NFR BLD: 9 % (ref 4–12)
MV PEAK A VEL: 0.62 M/S
MV PEAK E VEL: 108 CM/S
MV STENOSIS PRESSURE HALF TIME: 39 MS
MV VALVE AREA P 1/2 METHOD: 5.64
NEUTROPHILS # BLD MANUAL: 10.9 THOUSAND/UL (ref 1.85–7.62)
NEUTS SEG NFR BLD AUTO: 79 % (ref 43–75)
O2 CT BLDV-SCNC: 10.1 ML/DL
O2 CT BLDV-SCNC: 13.4 ML/DL
PCO2 BLDV: 29.5 MM HG (ref 42–50)
PCO2 BLDV: 35.4 MM HG (ref 42–50)
PH BLDV: 7.31 [PH] (ref 7.3–7.4)
PH BLDV: 7.36 [PH] (ref 7.3–7.4)
PHOSPHATE SERPL-MCNC: 5.2 MG/DL (ref 2.7–4.5)
PHOSPHATE SERPL-MCNC: 5.5 MG/DL (ref 2.7–4.5)
PLATELET # BLD AUTO: 215 THOUSANDS/UL (ref 149–390)
PLATELET BLD QL SMEAR: ADEQUATE
PMV BLD AUTO: 9.2 FL (ref 8.9–12.7)
PO2 BLDV: 39.6 MM HG (ref 35–45)
PO2 BLDV: 66.2 MM HG (ref 35–45)
POIKILOCYTOSIS BLD QL SMEAR: PRESENT
POTASSIUM SERPL-SCNC: 4.6 MMOL/L (ref 3.5–5.3)
POTASSIUM SERPL-SCNC: 5.3 MMOL/L (ref 3.5–5.3)
POTASSIUM SERPL-SCNC: 5.3 MMOL/L (ref 3.5–5.3)
PROCALCITONIN SERPL-MCNC: 3.16 NG/ML
RBC # BLD AUTO: 3.14 MILLION/UL (ref 3.88–5.62)
RBC MORPH BLD: PRESENT
RSV RNA NPH QL NAA+NON-PROBE: NOT DETECTED
RV+EV RNA NPH QL NAA+NON-PROBE: NOT DETECTED
SARS-COV-2 RNA NPH QL NAA+NON-PROBE: NOT DETECTED
SL CV LV EF: 40
SL CV PED ECHO LEFT VENTRICLE DIASTOLIC VOLUME (MOD BIPLANE) 2D: 80 ML
SL CV PED ECHO LEFT VENTRICLE SYSTOLIC VOLUME (MOD BIPLANE) 2D: 48 ML
SODIUM SERPL-SCNC: 124 MMOL/L (ref 135–147)
SODIUM SERPL-SCNC: 133 MMOL/L (ref 135–147)
SODIUM SERPL-SCNC: 133 MMOL/L (ref 135–147)
WBC # BLD AUTO: 13.8 THOUSAND/UL (ref 4.31–10.16)

## 2024-01-08 PROCEDURE — 94760 N-INVAS EAR/PLS OXIMETRY 1: CPT

## 2024-01-08 PROCEDURE — 5A1D90Z PERFORMANCE OF URINARY FILTRATION, CONTINUOUS, GREATER THAN 18 HOURS PER DAY: ICD-10-PCS | Performed by: INTERNAL MEDICINE

## 2024-01-08 PROCEDURE — 83735 ASSAY OF MAGNESIUM: CPT | Performed by: INTERNAL MEDICINE

## 2024-01-08 PROCEDURE — 76000 FLUOROSCOPY <1 HR PHYS/QHP: CPT | Performed by: STUDENT IN AN ORGANIZED HEALTH CARE EDUCATION/TRAINING PROGRAM

## 2024-01-08 PROCEDURE — 85027 COMPLETE CBC AUTOMATED: CPT

## 2024-01-08 PROCEDURE — 71045 X-RAY EXAM CHEST 1 VIEW: CPT

## 2024-01-08 PROCEDURE — 82375 ASSAY CARBOXYHB QUANT: CPT

## 2024-01-08 PROCEDURE — 82948 REAGENT STRIP/BLOOD GLUCOSE: CPT

## 2024-01-08 PROCEDURE — 93308 TTE F-UP OR LMTD: CPT | Performed by: INTERNAL MEDICINE

## 2024-01-08 PROCEDURE — 87081 CULTURE SCREEN ONLY: CPT | Performed by: NURSE PRACTITIONER

## 2024-01-08 PROCEDURE — 99223 1ST HOSP IP/OBS HIGH 75: CPT | Performed by: INTERNAL MEDICINE

## 2024-01-08 PROCEDURE — 80048 BASIC METABOLIC PNL TOTAL CA: CPT

## 2024-01-08 PROCEDURE — 93321 DOPPLER ECHO F-UP/LMTD STD: CPT | Performed by: INTERNAL MEDICINE

## 2024-01-08 PROCEDURE — 93005 ELECTROCARDIOGRAM TRACING: CPT

## 2024-01-08 PROCEDURE — 83605 ASSAY OF LACTIC ACID: CPT | Performed by: STUDENT IN AN ORGANIZED HEALTH CARE EDUCATION/TRAINING PROGRAM

## 2024-01-08 PROCEDURE — 99223 1ST HOSP IP/OBS HIGH 75: CPT

## 2024-01-08 PROCEDURE — 93325 DOPPLER ECHO COLOR FLOW MAPG: CPT | Performed by: INTERNAL MEDICINE

## 2024-01-08 PROCEDURE — 93308 TTE F-UP OR LMTD: CPT

## 2024-01-08 PROCEDURE — 36556 INSERT NON-TUNNEL CV CATH: CPT | Performed by: INTERNAL MEDICINE

## 2024-01-08 PROCEDURE — 87040 BLOOD CULTURE FOR BACTERIA: CPT | Performed by: NURSE PRACTITIONER

## 2024-01-08 PROCEDURE — 76937 US GUIDE VASCULAR ACCESS: CPT | Performed by: INTERNAL MEDICINE

## 2024-01-08 PROCEDURE — 90945 DIALYSIS ONE EVALUATION: CPT

## 2024-01-08 PROCEDURE — 85007 BL SMEAR W/DIFF WBC COUNT: CPT

## 2024-01-08 PROCEDURE — 36580 REPLACE CVAD CATH: CPT

## 2024-01-08 PROCEDURE — 36597 REPOSITION VENOUS CATHETER: CPT | Performed by: STUDENT IN AN ORGANIZED HEALTH CARE EDUCATION/TRAINING PROGRAM

## 2024-01-08 PROCEDURE — 94003 VENT MGMT INPAT SUBQ DAY: CPT

## 2024-01-08 PROCEDURE — 77001 FLUOROGUIDE FOR VEIN DEVICE: CPT

## 2024-01-08 PROCEDURE — 99239 HOSP IP/OBS DSCHRG MGMT >30: CPT | Performed by: INTERNAL MEDICINE

## 2024-01-08 PROCEDURE — 05H533Z INSERTION OF INFUSION DEVICE INTO RIGHT SUBCLAVIAN VEIN, PERCUTANEOUS APPROACH: ICD-10-PCS | Performed by: STUDENT IN AN ORGANIZED HEALTH CARE EDUCATION/TRAINING PROGRAM

## 2024-01-08 PROCEDURE — 87081 CULTURE SCREEN ONLY: CPT | Performed by: INTERNAL MEDICINE

## 2024-01-08 PROCEDURE — 85730 THROMBOPLASTIN TIME PARTIAL: CPT | Performed by: INTERNAL MEDICINE

## 2024-01-08 PROCEDURE — 83735 ASSAY OF MAGNESIUM: CPT

## 2024-01-08 PROCEDURE — 82330 ASSAY OF CALCIUM: CPT | Performed by: INTERNAL MEDICINE

## 2024-01-08 PROCEDURE — 05PY33Z REMOVAL OF INFUSION DEVICE FROM UPPER VEIN, PERCUTANEOUS APPROACH: ICD-10-PCS | Performed by: STUDENT IN AN ORGANIZED HEALTH CARE EDUCATION/TRAINING PROGRAM

## 2024-01-08 PROCEDURE — 93325 DOPPLER ECHO COLOR FLOW MAPG: CPT

## 2024-01-08 PROCEDURE — 82805 BLOOD GASES W/O2 SATURATION: CPT

## 2024-01-08 PROCEDURE — 80048 BASIC METABOLIC PNL TOTAL CA: CPT | Performed by: INTERNAL MEDICINE

## 2024-01-08 PROCEDURE — 84145 PROCALCITONIN (PCT): CPT

## 2024-01-08 PROCEDURE — 84100 ASSAY OF PHOSPHORUS: CPT | Performed by: INTERNAL MEDICINE

## 2024-01-08 PROCEDURE — 82805 BLOOD GASES W/O2 SATURATION: CPT | Performed by: PHYSICIAN ASSISTANT

## 2024-01-08 PROCEDURE — NC001 PR NO CHARGE: Performed by: INTERNAL MEDICINE

## 2024-01-08 PROCEDURE — 36556 INSERT NON-TUNNEL CV CATH: CPT

## 2024-01-08 PROCEDURE — 02H633Z INSERTION OF INFUSION DEVICE INTO RIGHT ATRIUM, PERCUTANEOUS APPROACH: ICD-10-PCS | Performed by: STUDENT IN AN ORGANIZED HEALTH CARE EDUCATION/TRAINING PROGRAM

## 2024-01-08 PROCEDURE — 93321 DOPPLER ECHO F-UP/LMTD STD: CPT

## 2024-01-08 RX ORDER — VANCOMYCIN HYDROCHLORIDE 750 MG/150ML
15 INJECTION, SOLUTION INTRAVENOUS ONCE
Status: DISCONTINUED | OUTPATIENT
Start: 2024-01-08 | End: 2024-01-08

## 2024-01-08 RX ORDER — VANCOMYCIN HYDROCHLORIDE 500 MG/100ML
10 INJECTION, SOLUTION INTRAVENOUS 3 TIMES WEEKLY
Status: DISCONTINUED | OUTPATIENT
Start: 2024-01-09 | End: 2024-01-09

## 2024-01-08 RX ORDER — ATORVASTATIN CALCIUM 40 MG/1
80 TABLET, FILM COATED ORAL DAILY
Status: CANCELLED | OUTPATIENT
Start: 2024-01-09

## 2024-01-08 RX ORDER — PREDNISONE 5 MG/1
5 TABLET ORAL DAILY
Status: DISCONTINUED | OUTPATIENT
Start: 2024-01-09 | End: 2024-01-08

## 2024-01-08 RX ORDER — ASPIRIN 81 MG/1
81 TABLET, CHEWABLE ORAL EVERY MORNING
Status: DISCONTINUED | OUTPATIENT
Start: 2024-01-09 | End: 2024-01-20 | Stop reason: HOSPADM

## 2024-01-08 RX ORDER — VANCOMYCIN HYDROCHLORIDE 500 MG/100ML
10 INJECTION, SOLUTION INTRAVENOUS 3 TIMES WEEKLY
Status: DISCONTINUED | OUTPATIENT
Start: 2024-01-09 | End: 2024-01-08 | Stop reason: HOSPADM

## 2024-01-08 RX ORDER — PANTOPRAZOLE SODIUM 40 MG/1
40 TABLET, DELAYED RELEASE ORAL
Status: CANCELLED | OUTPATIENT
Start: 2024-01-09

## 2024-01-08 RX ORDER — ACETAMINOPHEN 325 MG/1
975 TABLET ORAL ONCE
Status: COMPLETED | OUTPATIENT
Start: 2024-01-08 | End: 2024-01-08

## 2024-01-08 RX ORDER — ALBUTEROL SULFATE 2.5 MG/3ML
2.5 SOLUTION RESPIRATORY (INHALATION) EVERY 6 HOURS PRN
Status: DISCONTINUED | OUTPATIENT
Start: 2024-01-08 | End: 2024-01-08

## 2024-01-08 RX ORDER — TACROLIMUS 1 MG/1
2 CAPSULE ORAL EVERY MORNING
Status: DISCONTINUED | OUTPATIENT
Start: 2024-01-09 | End: 2024-01-20 | Stop reason: HOSPADM

## 2024-01-08 RX ORDER — ALBUTEROL SULFATE 2.5 MG/3ML
2.5 SOLUTION RESPIRATORY (INHALATION) EVERY 6 HOURS PRN
Status: CANCELLED | OUTPATIENT
Start: 2024-01-08

## 2024-01-08 RX ORDER — VANCOMYCIN HYDROCHLORIDE 500 MG/100ML
10 INJECTION, SOLUTION INTRAVENOUS ONCE
Qty: 100 ML | Refills: 0 | Status: COMPLETED | OUTPATIENT
Start: 2024-01-08 | End: 2024-01-08

## 2024-01-08 RX ORDER — CEFEPIME HYDROCHLORIDE 1 G/50ML
1000 INJECTION, SOLUTION INTRAVENOUS EVERY 24 HOURS
Status: CANCELLED | OUTPATIENT
Start: 2024-01-08

## 2024-01-08 RX ORDER — INSULIN LISPRO 100 [IU]/ML
1-6 INJECTION, SOLUTION INTRAVENOUS; SUBCUTANEOUS
Status: CANCELLED | OUTPATIENT
Start: 2024-01-08

## 2024-01-08 RX ORDER — ASPIRIN 81 MG/1
81 TABLET, CHEWABLE ORAL EVERY MORNING
Status: CANCELLED | OUTPATIENT
Start: 2024-01-09

## 2024-01-08 RX ORDER — CINACALCET 30 MG/1
30 TABLET, FILM COATED ORAL DAILY
Status: DISCONTINUED | OUTPATIENT
Start: 2024-01-09 | End: 2024-01-20 | Stop reason: HOSPADM

## 2024-01-08 RX ORDER — VANCOMYCIN HYDROCHLORIDE 500 MG/100ML
10 INJECTION, SOLUTION INTRAVENOUS ONCE
Status: CANCELLED | OUTPATIENT
Start: 2024-01-08

## 2024-01-08 RX ORDER — INSULIN GLARGINE 100 [IU]/ML
10 INJECTION, SOLUTION SUBCUTANEOUS EVERY 12 HOURS SCHEDULED
Status: DISCONTINUED | OUTPATIENT
Start: 2024-01-08 | End: 2024-01-10

## 2024-01-08 RX ORDER — VANCOMYCIN HYDROCHLORIDE 500 MG/100ML
10 INJECTION, SOLUTION INTRAVENOUS 3 TIMES WEEKLY
Status: CANCELLED | OUTPATIENT
Start: 2024-01-09

## 2024-01-08 RX ORDER — HEPARIN SODIUM 10000 [USP'U]/100ML
3-20 INJECTION, SOLUTION INTRAVENOUS
Status: CANCELLED | OUTPATIENT
Start: 2024-01-08

## 2024-01-08 RX ORDER — TACROLIMUS 1 MG/1
1 CAPSULE ORAL EVERY EVENING
Status: CANCELLED | OUTPATIENT
Start: 2024-01-08

## 2024-01-08 RX ORDER — PANTOPRAZOLE SODIUM 40 MG/1
40 TABLET, DELAYED RELEASE ORAL
Status: DISCONTINUED | OUTPATIENT
Start: 2024-01-09 | End: 2024-01-14

## 2024-01-08 RX ORDER — INSULIN GLARGINE 100 [IU]/ML
12 INJECTION, SOLUTION SUBCUTANEOUS EVERY 12 HOURS SCHEDULED
Status: DISCONTINUED | OUTPATIENT
Start: 2024-01-08 | End: 2024-01-08

## 2024-01-08 RX ORDER — ONDANSETRON 2 MG/ML
4 INJECTION INTRAMUSCULAR; INTRAVENOUS EVERY 8 HOURS PRN
Status: DISCONTINUED | OUTPATIENT
Start: 2024-01-08 | End: 2024-01-20 | Stop reason: HOSPADM

## 2024-01-08 RX ORDER — INSULIN GLARGINE 100 [IU]/ML
12 INJECTION, SOLUTION SUBCUTANEOUS EVERY 12 HOURS SCHEDULED
Status: CANCELLED | OUTPATIENT
Start: 2024-01-08

## 2024-01-08 RX ORDER — ALBUTEROL SULFATE 90 UG/1
2 AEROSOL, METERED RESPIRATORY (INHALATION) EVERY 4 HOURS PRN
Status: DISCONTINUED | OUTPATIENT
Start: 2024-01-08 | End: 2024-01-20 | Stop reason: HOSPADM

## 2024-01-08 RX ORDER — ATORVASTATIN CALCIUM 80 MG/1
80 TABLET, FILM COATED ORAL
Status: DISCONTINUED | OUTPATIENT
Start: 2024-01-09 | End: 2024-01-20 | Stop reason: HOSPADM

## 2024-01-08 RX ORDER — CALCITRIOL 0.25 UG/1
0.5 CAPSULE, LIQUID FILLED ORAL 3 TIMES WEEKLY
Status: DISCONTINUED | OUTPATIENT
Start: 2024-01-09 | End: 2024-01-20 | Stop reason: HOSPADM

## 2024-01-08 RX ORDER — CEFEPIME HYDROCHLORIDE 1 G/50ML
1000 INJECTION, SOLUTION INTRAVENOUS ONCE
Status: DISCONTINUED | OUTPATIENT
Start: 2024-01-09 | End: 2024-01-08

## 2024-01-08 RX ORDER — TACROLIMUS 1 MG/1
1 CAPSULE ORAL EVERY EVENING
Status: DISCONTINUED | OUTPATIENT
Start: 2024-01-08 | End: 2024-01-20 | Stop reason: HOSPADM

## 2024-01-08 RX ORDER — INSULIN LISPRO 100 [IU]/ML
1-6 INJECTION, SOLUTION INTRAVENOUS; SUBCUTANEOUS
Status: DISCONTINUED | OUTPATIENT
Start: 2024-01-08 | End: 2024-01-10

## 2024-01-08 RX ORDER — TACROLIMUS 1 MG/1
2 CAPSULE ORAL EVERY MORNING
Status: CANCELLED | OUTPATIENT
Start: 2024-01-09

## 2024-01-08 RX ORDER — CALCITRIOL 0.25 UG/1
0.5 CAPSULE, LIQUID FILLED ORAL 3 TIMES WEEKLY
Status: DISCONTINUED | OUTPATIENT
Start: 2024-01-09 | End: 2024-01-08 | Stop reason: HOSPADM

## 2024-01-08 RX ORDER — CINACALCET 30 MG/1
30 TABLET, FILM COATED ORAL DAILY
Status: CANCELLED | OUTPATIENT
Start: 2024-01-09

## 2024-01-08 RX ORDER — HEPARIN SODIUM 10000 [USP'U]/100ML
3-20 INJECTION, SOLUTION INTRAVENOUS
Status: DISCONTINUED | OUTPATIENT
Start: 2024-01-08 | End: 2024-01-10

## 2024-01-08 RX ORDER — CALCITRIOL 0.25 UG/1
0.5 CAPSULE, LIQUID FILLED ORAL 3 TIMES WEEKLY
Status: CANCELLED | OUTPATIENT
Start: 2024-01-09

## 2024-01-08 RX ORDER — INSULIN LISPRO 100 [IU]/ML
1-6 INJECTION, SOLUTION INTRAVENOUS; SUBCUTANEOUS
Status: DISCONTINUED | OUTPATIENT
Start: 2024-01-08 | End: 2024-01-20 | Stop reason: HOSPADM

## 2024-01-08 RX ORDER — CEFEPIME HYDROCHLORIDE 1 G/50ML
1000 INJECTION, SOLUTION INTRAVENOUS EVERY 24 HOURS
Status: DISCONTINUED | OUTPATIENT
Start: 2024-01-08 | End: 2024-01-08 | Stop reason: HOSPADM

## 2024-01-08 RX ORDER — CALCIUM GLUCONATE 20 MG/ML
2 INJECTION, SOLUTION INTRAVENOUS ONCE
Status: COMPLETED | OUTPATIENT
Start: 2024-01-08 | End: 2024-01-09

## 2024-01-08 RX ADMIN — HEPARIN SODIUM 14 UNITS/KG/HR: 10000 INJECTION, SOLUTION INTRAVENOUS at 13:30

## 2024-01-08 RX ADMIN — INSULIN GLARGINE 10 UNITS: 100 INJECTION, SOLUTION SUBCUTANEOUS at 21:07

## 2024-01-08 RX ADMIN — HYDROCORTISONE SODIUM SUCCINATE 25 MG: 100 INJECTION, POWDER, FOR SOLUTION INTRAMUSCULAR; INTRAVENOUS at 21:20

## 2024-01-08 RX ADMIN — ONDANSETRON 4 MG: 2 INJECTION INTRAMUSCULAR; INTRAVENOUS at 13:47

## 2024-01-08 RX ADMIN — INSULIN GLARGINE 12 UNITS: 100 INJECTION, SOLUTION SUBCUTANEOUS at 11:56

## 2024-01-08 RX ADMIN — Medication 20000 ML: at 19:44

## 2024-01-08 RX ADMIN — PANTOPRAZOLE SODIUM 40 MG: 40 TABLET, DELAYED RELEASE ORAL at 05:27

## 2024-01-08 RX ADMIN — TACROLIMUS 2 MG: 1 CAPSULE ORAL at 11:57

## 2024-01-08 RX ADMIN — HYDROCORTISONE SODIUM SUCCINATE 100 MG: 100 INJECTION, POWDER, FOR SOLUTION INTRAMUSCULAR; INTRAVENOUS at 11:55

## 2024-01-08 RX ADMIN — ATORVASTATIN CALCIUM 80 MG: 40 TABLET, FILM COATED ORAL at 11:57

## 2024-01-08 RX ADMIN — TACROLIMUS 1 MG: 1 CAPSULE ORAL at 19:43

## 2024-01-08 RX ADMIN — CEFEPIME 1000 MG: 1 INJECTION, POWDER, FOR SOLUTION INTRAMUSCULAR; INTRAVENOUS at 16:00

## 2024-01-08 RX ADMIN — NOREPINEPHRINE BITARTRATE 5 MCG/MIN: 1 INJECTION, SOLUTION, CONCENTRATE INTRAVENOUS at 13:53

## 2024-01-08 RX ADMIN — INSULIN LISPRO 2 UNITS: 100 INJECTION, SOLUTION INTRAVENOUS; SUBCUTANEOUS at 21:08

## 2024-01-08 RX ADMIN — IOHEXOL 5 ML: 350 INJECTION, SOLUTION INTRAVENOUS at 18:16

## 2024-01-08 RX ADMIN — CINACALCET 30 MG: 30 TABLET, FILM COATED ORAL at 11:57

## 2024-01-08 RX ADMIN — CALCIUM GLUCONATE 2 G: 20 INJECTION, SOLUTION INTRAVENOUS at 23:15

## 2024-01-08 RX ADMIN — INSULIN LISPRO 5 UNITS: 100 INJECTION, SOLUTION INTRAVENOUS; SUBCUTANEOUS at 17:14

## 2024-01-08 RX ADMIN — VANCOMYCIN HYDROCHLORIDE 500 MG: 500 INJECTION, SOLUTION INTRAVENOUS at 11:55

## 2024-01-08 RX ADMIN — ACETAMINOPHEN 975 MG: 325 TABLET, FILM COATED ORAL at 06:14

## 2024-01-08 RX ADMIN — ASPIRIN 81 MG: 81 TABLET, CHEWABLE ORAL at 11:57

## 2024-01-08 RX ADMIN — NOREPINEPHRINE BITARTRATE 2 MCG/MIN: 1 SOLUTION INTRAVENOUS at 10:00

## 2024-01-08 RX ADMIN — HEPARIN SODIUM 14 UNITS/KG/HR: 10000 INJECTION, SOLUTION INTRAVENOUS at 19:34

## 2024-01-08 RX ADMIN — SODIUM CHLORIDE 500 ML: 0.9 INJECTION, SOLUTION INTRAVENOUS at 07:48

## 2024-01-08 NOTE — ASSESSMENT & PLAN NOTE
Lab Results   Component Value Date    HGBA1C 7.7 (H) 01/07/2024       Recent Labs     01/07/24  1543 01/07/24  2106 01/08/24  0705 01/08/24  1122   POCGLU 82 101 183* 209*       Blood Sugar Average: Last 72 hrs:  (P) 143.75  Update A1c - 7.7, near goal   Continue lantus 12 units q12  Take Novolog 8 units with meals as needed.   Hold scheduled, cover with SSI alone for now

## 2024-01-08 NOTE — H&P
[facility]  H&P  Name: Berto Faria 53 y.o. male I MRN: [id number]  Unit/Bed#: PPHP 518-01 I Date of Admission: 1/8/2024   Date of Service: 1/8/2024 I Hospital Day: 0      Assessment/Plan   * Severe sepsis with septic shock (HCC)  Assessment & Plan  Patient presented to the ER 1/7 with fatigue and hypoxemia - worsening tachypnea, leukocytosis and hypotension 1/8  Probable source: PNA on imagine , no skin source, No urine sample.   Plan  Continue Levophed for Blood Pressure Support- did not receive 30cc/kg 2/2 HFrEF  Wean as tolerated for MAP > 65  Continue cefepime and vanc - D2  Follow-up Micro     Multifocal pneumonia  Assessment & Plan   New oxygen requirement 1/7, identified on CT chest   1/7: Bilateral multilevel airspace consolidation, groundglass opacities, septal thickening and small bilateral pleural effusions secondary to volume overloaded as well as pneumonia.  Plan:  Broad-spectrum antibiotics as above  Follow-up micro    Acute on chronic diastolic (congestive) heart failure (HCC)  Assessment & Plan  Wt Readings from Last 3 Encounters:   01/08/24 50 kg (110 lb 3.7 oz)   08/21/23 51.4 kg (113 lb 6.4 oz)   08/07/23 54.4 kg (120 lb)   Echo 1/8: LVEF 40%, systolic function is moderately reduced, elevated left atrial pressure.  Mild mitral regurgitation.  Home medications: Coreg 6.25 twice daily, Imdur 30 daily, metoprolol 50 daily, nifedipine 120 daily  Plan:  In the setting of hypotension, will hold home medications.  Volume management per nephrology            Acute on chronic respiratory failure with hypoxia ([facility])  Assessment & Plan  Acute hypoxic respiratory failure on 10 L mid flow oxygen  Secondary to multifocal pneumonia as well as pulmonary vascular congestion.  Plan:  Antibiotics as above.  Management per nephrology  Wean with sat > 90%    ESRD (end stage renal disease) ([facility])  Assessment & Plan  Lab Results   Component Value Date    EGFR 8 01/08/2024     EGFR 11 01/07/2024    EGFR 17 04/27/2023    CREATININE 7.05 (H) 01/08/2024    CREATININE 5.36 (H) 01/07/2024    CREATININE 3.68 (H) 04/27/2023   History of failed renal transplant, ESRD on dialysis x 4 years  Dialysis regimen: Tuesday, Thursday, Saturday.  Given hypotension and Levophed requirement, will initiate on CVVH via temporary HD catheter  Plan:  IR consulted for temporary HD catheter given multiple attempts and failed placement.  Nephrology following, appreciate recs-plan for CVVH once access obtained    NSTEMI (non-ST elevated myocardial infarction) (HCC)  Assessment & Plan  Presented to the ER on 1/10 with shortness of breath, EKG without ST segment changes.  Elevated troponin on admission.  Patient currently denies any chest pain.  Plan:  NSTEMI possibly related to septic shock.  Continue heparin drip for now.  Continue ASA, Lipitor.  Hold beta-blocker given hypotension.  Allergy consulted, appreciate recs    Renal transplant failure and rejection  Assessment & Plan  History of renal transplant chronically on tacrolimus 1 mg p.o. nightly, 2 mg p.o. every morning, AZA 50 daily, Prednisone 5 Daily  Plan:  Will continue tacrolimus for now and prednisone 5 daily.  Will hold AZA.  Reach out to transplant center for update regarding these medications.    Type 1 diabetes mellitus on insulin therapy ([facility])  Assessment & Plan  Lab Results   Component Value Date    HGBA1C 7.7 (H) 01/07/2024       Recent Labs     01/07/24  1543 01/07/24  2106 01/08/24  0705 01/08/24  1122   POCGLU 82 101 183* 209*       Blood Sugar Average: Last 72 hrs:  Type 1 diabetic: Home regimen-aspart 10 units 3 times daily, glargine 10 units every 12.  Current regimen: Glargine 12 units every 12 with sliding scale coverage.             History of Present Illness     HPI: Berto Faria is a 53 y.o.  male with past medical history of type 1 diabetes mellitus, failed renal transplant with ESRD on dialysis HFrEF who presents with hypoxemia  and generally not feeling well. He was in his usual state of health until Saturday night (2 days ago) when his wife states he was restless and couldn't sleep. Yesterday he states that he generally did not feel well and was fatigued, but had no other focal concern. He has a home pulse ox and noticed that his oxygen levels were in the low 80% range. He does not chronically use oxygen. This caused him to seek medical attention at [facility].    While there is was found to be in septic shock with source suspected to be a pneumonia as demonstrated to imaging. Cultures negative so far. He was also volume overloaded. In the setting of her chronic renal failure, he was transferred there for CVVHD while on vasopressors    History obtained from chart review and the patient.  Review of Systems   Constitutional:  Positive for activity change, appetite change and fatigue. Negative for chills and fever.   HENT:  Positive for ear pain. Negative for sore throat.    Eyes:  Negative for pain and visual disturbance.   Respiratory:  Negative for cough and shortness of breath.    Cardiovascular:  Negative for chest pain and palpitations.   Gastrointestinal:  Negative for abdominal pain and vomiting.   Genitourinary:  Negative for dysuria and hematuria.   Musculoskeletal:  Negative for arthralgias and back pain.   Skin:  Negative for color change and rash.   Neurological:  Negative for seizures and syncope.   All other systems reviewed and are negative.    Disposition: Critical care  Historical Information   Past Medical History:  12/21/2018: Bacteremia  No date: CAD (coronary artery disease)      Comment:  s/p MARC to LCx, pLAD 2/2018  No date: Cardiac arrest ([facility])  No date: Chronic kidney disease  No date: Diabetes mellitus type 1 ([facility])  No date: Dialysis patient ([facility])      Comment:  Access in right chest  No date: GI bleed  No date: Hyperlipidemia  No date: Hypertension  No date: Infection at site of external fixator pin ([facility])  No date:  MI (myocardial infarction) (HCC)  No date: Myocardial infarction (HCC)  No date: Pneumonia      Comment:  Last Assessed 26Feb2013  No date: Renal failure  07/21/2007: Renal transplant, status post Past Surgical History:  02/2019: AMPUTATION; Right      Comment:  aboce knee  No date: ANKLE FRACTURE SURGERY  7/31/2017: ANKLE HARDWARE REMOVAL; Right      Comment:  Procedure: REMOVAL HARDWARE ANKLE;  Surgeon: Alvin Leon MD;  Location: [facility];  Service: Orthopedics  8/17/2017: ANKLE HARDWARE REMOVAL; Right      Comment:  Procedure: TIBIA FAILED HARDWARE REMOVAL;  Surgeon:                Alvin Leon MD;  Location: [facility];  Service:                Orthopedics  7/3/2017: CLOSED REDUCTION ANKLE; Right      Comment:  Procedure: CLOSED REDUCTION DISTAL TIB-FIB AND CASTING                VS;  Surgeon: Alvin Leon MD;  Location: [facility];                 Service: Orthopedics  02/2018: CORONARY ANGIOPLASTY WITH STENT PLACEMENT      Comment:  CAD s/p MARC to LCx, pLAD  12/20/2018: ESOPHAGOGASTRODUODENOSCOPY; N/A      Comment:  Procedure: ESOPHAGOGASTRODUODENOSCOPY (EGD) in ICU;                 Surgeon: Galileo Wise IV, MD;  Location: [facility];                 Service: Gastroenterology  No date: EYE SURGERY  No date: FRACTURE SURGERY      Comment:  ORIF Rt Ankle  No date: GLUTAMIC ACID DECARBOXYLASE (HISTORICAL)  4/16/2020: IR AV FISTULAGRAM/GRAFTOGRAM  6/23/2023: IR AV FISTULAGRAM/GRAFTOGRAM  8/20/2018: IR PICC LINE  1/8/2020: IR TUNNELED DIALYSIS CATHETER CHECK/CHANGE/REPOSITION/  ANGIOPLASTY  10/21/2019: IR TUNNELED DIALYSIS CATHETER PLACEMENT  5/29/2020: IR TUNNELED DIALYSIS CATHETER REMOVAL  10/5/2018: IR VENOUS LINE REMOVAL  02/01/2019: LEG AMPUTATION; Right      Comment:  Above the knee  No date: NEPHRECTOMY TRANSPLANTED ORGAN  2/11/2020: ND ARTERIOVENOUS ANASTOMOSIS OPEN DIRECT; Right      Comment:  Procedure: CREATION FISTULA ARTERIOVENOUS (AV) right                upper extremity;   "Surgeon: Carlos Medina MD;  Location:                 MAIN OR;  Service: Vascular  7/3/2017: [place] OPEN TREATMENT FRACTURE DISTAL TIBIA FIBULA; Right      Comment:  Procedure: OPEN REDUCTION W/ INTERNAL FIXATION (ORIF);                 Surgeon: Alvin Leon MD;  Location: [facility];                 Service: Orthopedics  10/27/2016: TOE AMPUTATION; Right      Comment:  Procedure: AMPUTATION TOE;  Surgeon: Krishna Ruiz DPM;               Location: [facility];  Service:    Current Outpatient Medications   Medication Instructions    aspirin 81 mg, Oral, Every morning    atorvastatin (LIPITOR) 80 mg, Oral, Daily    azaTHIOprine (IMURAN) 50 mg, Oral, Daily    carvedilol (COREG) 6.25 mg, Oral, 2 times daily with meals    cholecalciferol (VITAMIN D3) 5,000 Units, Oral, Daily    cinacalcet (SENSIPAR) 30 mg, Oral, Daily    Glucagon, rDNA, (Glucagon Emergency) 1 MG KIT INJECT 1MG SUBCUTANEOUSLY ONCE AS NEEDED FOR LOW BLOOD SUGAR FOR UP TO 1 DOSE    glucose blood (Contour Next Test) test strip Use as instructed 6 times daily    insulin aspart (NOVOLOG) 10 Units, Subcutaneous Insulin Pump, 3 times daily    insulin glargine (SEMGLEE) 10 Units, Subcutaneous, Every 12 hours scheduled    Insulin Syringe-Needle U-100 31G X 15/64\" 0.5 ML MISC Does not apply, 3 times daily    isosorbide mononitrate (IMDUR) 30 mg, Oral, Daily    metoprolol succinate (TOPROL-XL) 50 mg, Oral, Daily    multivitamin (THERAGRAN) TABS 1 tablet, Oral, Daily    NIFEdipine (PROCARDIA XL) 120 mg, Oral, Daily    pantoprazole (PROTONIX) 40 mg, Oral, Daily    predniSONE 5 mg, Oral, Daily    tacrolimus (PROGRAF) 1 mg capsule 2 caps in the morning and 1 cap in the evening    No Known Allergies   Social History     Tobacco Use    Smoking status: Never    Smokeless tobacco: Never   Vaping Use    Vaping status: Never Used   Substance Use Topics    Alcohol use: Not Currently     Alcohol/week: 0.0 standard drinks of alcohol    Drug use: Never    Family History   Problem " Relation Age of Onset    Diabetes Brother     Coronary artery disease Mother     No Known Problems Father           Objective                            Vitals I/O      Most Recent Min/Max in 24hrs   Temp 97.6 °F (36.4 °C) Temp  Min: 97.6 °F (36.4 °C)  Max: 100.1 °F (37.8 °C)   Pulse 75 Pulse  Min: 69  Max: 87   Resp (!) 28 Resp  Min: 20  Max: 43   BP 96/52 BP  Min: 79/46  Max: 119/64   O2 Sat 97 % SpO2  Min: 88 %  Max: 98 %      Intake/Output Summary (Last 24 hours) at 1/8/2024 1702  Last data filed at 1/8/2024 1600  Gross per 24 hour   Intake 40.48 ml   Output --   Net 40.48 ml       Diet Renal; Renal Restrictive; Yes; Fluid Restriction 1000 ML; No; Consistent Carbohydrate Diet Level 2 (5 carb servings/75 grams CHO/meal)    Invasive Monitoring           Physical Exam   Physical Exam  Vitals and nursing note reviewed.   Eyes:      Extraocular Movements: Extraocular movements intact.      Pupils: Pupils are equal, round, and reactive to light.   Skin:     General: Skin is warm.   HENT:      Head: Normocephalic.      Nose: No congestion.      Mouth/Throat:      Mouth: Mucous membranes are moist.   Cardiovascular:      Rate and Rhythm: Normal rate and regular rhythm.      Pulses: Normal pulses.   Musculoskeletal:         General: Normal range of motion.      Comments: Right lower amputation   Abdominal:      Palpations: Abdomen is soft.   Constitutional:       Appearance: He is ill-appearing.   Pulmonary:      Effort: Pulmonary effort is normal.      Breath sounds: Rales present.   Neurological:      General: No focal deficit present.      Mental Status: He is alert and oriented to person, place and time. Mental status is at baseline.      Motor: gross motor function is at baseline for patient. Strength full and intact in all extremities.   Genitourinary/Anorectal:  Chowdary present.          Diagnostic Studies      EKG: NSR  Imaging:  I have personally reviewed pertinent reports.       Medications:  Scheduled PRN    [START ON 1/9/2024] aspirin, 81 mg, QAM  [START ON 1/9/2024] atorvastatin, 80 mg, Daily With Dinner  [START ON 1/9/2024] calcitriol, 0.5 mcg, Once per day on Tuesday Thursday Saturday  cefepime, 1,000 mg, Q24H  [START ON 1/9/2024] cinacalcet, 30 mg, Daily  insulin glargine, 12 Units, Q12H JAMARCUS  insulin lispro, 1-6 Units, TID AC  insulin lispro, 1-6 Units, HS  [START ON 1/9/2024] pantoprazole, 40 mg, Early Morning  [START ON 1/9/2024] predniSONE, 5 mg, Daily  tacrolimus, 1 mg, QPM  [START ON 1/9/2024] tacrolimus, 2 mg, QAM  [START ON 1/9/2024] vancomycin, 10 mg/kg, Once per day on Tuesday Thursday Saturday      albuterol, 2 puff, Q4H PRN  ondansetron, 4 mg, Q8H PRN       Continuous    heparin (porcine), 3-20 Units/kg/hr (Order-Specific), Last Rate: 14 Units/kg/hr (01/08/24 1330)  norepinephrine, 1-30 mcg/min, Last Rate: Stopped (01/08/24 1647)  NxStage K 4/Ca 3, 20,000 mL         Labs:    CBC    Recent Labs     01/07/24  1015 01/08/24  0429   WBC 13.58* 13.80*   HGB 12.0 10.3*   HCT 37.9 32.2*    215     BMP    Recent Labs     01/07/24  1015 01/08/24  0429   SODIUM 135 133*   K 3.5 4.6   CL 96 95*   CO2 27 23   AGAP 12 15   BUN 30* 47*   CREATININE 5.36* 7.05*   CALCIUM 9.3 8.9       Coags    Recent Labs     01/07/24  1015 01/07/24  1906 01/08/24  0210 01/08/24  0906   INR 1.00  --   --   --    PTT 37   < > 73* 78*    < > = values in this interval not displayed.        Additional Electrolytes  Recent Labs     01/08/24  0429   MG 2.3          Blood Gas    No recent results  Recent Labs     01/08/24  1213   PHVEN 7.360   [id number] 29.5*   PO2VEN 66.2*   XGH3CQH 16.3*   BEVEN -8.0   [id number] 89.4*    LFTs  Recent Labs     01/07/24  1015   ALT 19   AST 27   ALKPHOS 98   ALB 3.9   TBILI 0.65       Infectious  Recent Labs     01/07/24  1109 01/08/24  0429   PROCALCITONI 0.44* 3.16*     Glucose  Recent Labs     01/07/24  1015 01/08/24  0429   GLUC 119 146*             Anticipated Length of Stay is > 2 midnights  Andre  EMANUEL Mendoza

## 2024-01-08 NOTE — CONSULTS
Consultation - Nephrology   Berto Faria 53 y.o. male MRN: [id number]  Unit/Bed#: 419-01 Encounter: [id number]      A/P:  1.  End-stage renal disease   Will continue hemodialysis sessions Tuesday Thursday Saturday, next regular session will be tomorrow, Tuesday, January 9.  Orders in the chart if he is able to tolerate intermittent hemodialysis.  Given the patient's current blood pressures, may need to transition to CVVHD to accommodate.  Will continue closely monitor the patient from a clinical standpoint.  Patient typically receives heparin 1600 units with each hemodialysis session along with 600 units/hour.  His dry weight is typically 47.5 kg.  2.  Secondary hyperparathyroidism, renal   Continue with Cinacalcet 30 mg daily, will place the patient on calcitriol 0.5 mcg 3 times weekly, and continue sevelamer 1 tablet 4 times daily.  3.  Anemia due to end-stage renal disease   Continue to monitor hemoglobin, patient on Mircera in the outpatient setting.  4.  Immunocompromised patient   Patient on azathioprine and tacrolimus due to kidney transplant.  Will hold azathioprine for now, continue tacrolimus, and go from there.  Patient also chronically on prednisone, will provide IV hydrocortisone to assist with potential adrenal insufficiency.  5.  Shock potentially due to sepsis  As noted above, will provide the patient with IV hydrocortisone due to potential adrenal insufficiency given chronic steroids.  Patient to be transferred to the intensive care unit, be given vasopressors as tolerated peripherally, with the potential for central line placement and more aggressive treatment depending on clinical progression.  6.  Multifocal pneumonia   Continue with antibiotics according to primary, potential source of septic shock.  Follow-up cultures, continue with other supportive measures as indicated.  7.  Elevated troponin   Potentially due to demand type ischemia, continue to monitor, patient currently on heparin  drip.  Cardiology to evaluate the patient later today.  8.  Diabetes mellitus type 1 with diabetic nephropathy    Case was discussed with hospitalist as well as pulmonary/critical care physicians.  Will look to allow the patient other 24 hours for hemodynamic stability and potential intermittent hemodialysis tomorrow.  Otherwise he may be candidate for CVVHD.  Agree with vasopressors, antibiotics, and other medical interventions noted above.  We will hold azathioprine, and discontinue prednisone to transition to IV hydrocortisone at this time.          Thank you for allowing us to participate in the care of your patient.     Please feel free to contact us regarding the care of this patient, or any other questions/concerns that may be applicable.    Patient Active Problem List   Diagnosis    Type 1 diabetes mellitus (HCC)    Renal transplant, status post    Hyperkalemia    Osteomyelitis ([facility])    Poor circulation    Closed fracture of distal end of right tibia with routine healing    Chronic osteomyelitis of tibia ([facility])    Immunosuppression ([facility])    Hypertension    Elevated troponin    Diarrhea    Cellulitis of ankle    Non-ST elevation myocardial infarction (NSTEMI), type 2    Urinary retention    Severe sepsis ([facility])    Hyperphosphatemia    Hyponatremia    Gastrointestinal hemorrhage    S/P AKA (above knee amputation), right ([facility])    Acute blood loss anemia    Acute respiratory failure with hypoxia ([facility])    Pulmonary hypertension ([facility])    Chronic anemia    Depressed left ventricular ejection fraction    Acute pulmonary edema ([facility])    Hx of right BKA ([facility])    Hypertensive urgency    ESRD (end stage renal disease) on dialysis ([facility])    Coronary artery disease involving native coronary artery of native heart without angina pectoris    Pre-operative cardiovascular examination    Chronic kidney disease, unspecified    Pancreatic lesion    Abnormal CT scan, colon    Respiratory distress    Community acquired pneumonia of  right lower lobe of lung    Multifocal pneumonia    Acute on chronic respiratory failure with hypoxia (HCC)    Elevated MCV    Hyperbilirubinemia    Acute on chronic diastolic CHF (congestive heart failure) (HCC)    Elevated procalcitonin    Mitral valve insufficiency    Abnormal EKG    Vasovagal syncope    Acute on chronic diastolic congestive heart failure (HCC)    Severe sepsis with septic shock (HCC)       History of Present Illness   Physician Requesting Consult: Tony Licea DO  Reason for Consult / Principal Problem: End-stage renal disease  Hx and PE limited by:   HPI: Berto Faria is a 53 y.o. year old male who presented to the emergency room yesterday with complaints of progressive worsening shortness of breath over short period of time, perhaps 24-48 hours.  He felt fatigued, but denied fevers, and developed a cough prior to presenting to the hospital.  Patient was noted to have hypotension as well as significant hypoxia at presentation.  He is currently on oxygen via nasal cannula, with oxygenation just below 90%, at 6 L.  He is currently receiving an additional bolus of normal saline 500 mill to assist with blood pressure which is reduced at about 80 mmHg systolic.  Patient was admitted with suspected multifocal pneumonia associated with potential acute on chronic diastolic congestive heart failure, with potential demand type II myocardial ischemia.    Complicating matters is that the patient is on immunosuppression in the form of tacrolimus due to previous history of kidney transplant with residual function.  Patient currently on hemodialysis 3 times weekly, Tuesday Thursday and Saturday, attends his treatments with no specific issues.    History obtained from chart review and the patient    Constitutional ROS-positive for fatigue  HEENT ROS- Denies history of eye surgeries, glaucoma, headaches or history of trauma, blurred vision..  Endocrine ROS- No history diabetes mellitus or thyroid  disease.  Cardiovascular ROS- Denies chest pain, palpitation, dyspnea exertion, orthopnea, claudication.  Pulmonary ROS-shortness of breath, improved with oxygen via nasal cannula,  GI ROS-poor appetite, negative abdominal pain, no nausea or vomiting positive for productive cough  Hematological ROS- Denies history of easy bruising, blood clots, bleeding or blood transfusions.  Genitourinary ROS- Denies recent hematuria, pyuria, flank pain, change in urinary stream, decreased urinary output, increased urinary frequency, nocturia, foamy urine, or urinary incontinence.  Lymphatic ROS- Denies lymphadenopathy.  Musculoskeletal ROS- Denies history of muscle weakness, joint pain.  Dermatological ROS- Denies rash, wounds, ulcers, itching, jaundice.  Psychiatric ROS- Denies anxiety, depression, hallucinations, disorientation.  Neurological ROS- No stroke or TIA symptoms. .    Historical Information   Past Medical History:   Diagnosis Date    Bacteremia 12/21/2018    CAD (coronary artery disease)     s/p MARC to LCx, pLAD 2/2018    Cardiac arrest (HCC)     Chronic kidney disease     Diabetes mellitus type 1 (HCC)     Dialysis patient (HCC)     Access in right chest    GI bleed     Hyperlipidemia     Hypertension     Infection at site of external fixator pin (HCC)     MI (myocardial infarction) (HCC)     Myocardial infarction (HCC)     Pneumonia     Last Assessed [id number]    Renal failure     Renal transplant, status post 07/21/2007     Past Surgical History:   Procedure Laterality Date    AMPUTATION Right 02/2019    aboce knee    ANKLE FRACTURE SURGERY      ANKLE HARDWARE REMOVAL Right 7/31/2017    Procedure: REMOVAL HARDWARE ANKLE;  Surgeon: Alvin Leon MD;  Location: [facility];  Service: Orthopedics    ANKLE HARDWARE REMOVAL Right 8/17/2017    Procedure: TIBIA FAILED HARDWARE REMOVAL;  Surgeon: Alvin Leon MD;  Location: [facility];  Service: Orthopedics    CLOSED REDUCTION ANKLE Right 7/3/2017    Procedure: CLOSED  REDUCTION DISTAL TIB-FIB AND CASTING VS;  Surgeon: Alvin Leon MD;  Location: [facility];  Service: Orthopedics    CORONARY ANGIOPLASTY WITH STENT PLACEMENT  02/2018    CAD s/p MARC to LCx, pLAD    ESOPHAGOGASTRODUODENOSCOPY N/A 12/20/2018    Procedure: ESOPHAGOGASTRODUODENOSCOPY (EGD) in ICU;  Surgeon: Galileo Wise IV, MD;  Location: [facility];  Service: Gastroenterology    EYE SURGERY      FRACTURE SURGERY      ORIF Rt Ankle    GLUTAMIC ACID DECARBOXYLASE (HISTORICAL)      IR AV FISTULAGRAM/GRAFTOGRAM  4/16/2020    IR AV FISTULAGRAM/GRAFTOGRAM  6/23/2023    IR PICC LINE  8/20/2018    IR TUNNELED DIALYSIS CATHETER CHECK/CHANGE/REPOSITION/ANGIOPLASTY  1/8/2020    IR TUNNELED DIALYSIS CATHETER PLACEMENT  10/21/2019    IR TUNNELED DIALYSIS CATHETER REMOVAL  5/29/2020    IR VENOUS LINE REMOVAL  10/5/2018    LEG AMPUTATION Right 02/01/2019    Above the knee    NEPHRECTOMY TRANSPLANTED ORGAN      DC ARTERIOVENOUS ANASTOMOSIS OPEN DIRECT Right 2/11/2020    Procedure: CREATION FISTULA ARTERIOVENOUS (AV) right upper extremity;  Surgeon: Carlos Medina MD;  Location:  MAIN OR;  Service: Vascular    DC OPEN TREATMENT FRACTURE DISTAL TIBIA FIBULA Right 7/3/2017    Procedure: OPEN REDUCTION W/ INTERNAL FIXATION (ORIF);  Surgeon: Alvin Leon MD;  Location: [facility];  Service: Orthopedics    TOE AMPUTATION Right 10/27/2016    Procedure: AMPUTATION TOE;  Surgeon: Krishna Ruiz DPM;  Location: [facility];  Service:      Social History   Social History     Substance and Sexual Activity   Alcohol Use Not Currently    Alcohol/week: 0.0 standard drinks of alcohol     Social History     Substance and Sexual Activity   Drug Use Never     Social History     Tobacco Use   Smoking Status Never   Smokeless Tobacco Never     Family History   Problem Relation Age of Onset    Diabetes Brother     Coronary artery disease Mother     No Known Problems Father        Meds/Allergies   all current active meds have been reviewed, current meds:    Current Facility-Administered Medications   Medication Dose Route Frequency    albuterol inhalation solution 2.5 mg  2.5 mg Nebulization Q6H PRN    aspirin chewable tablet 81 mg  81 mg Oral QAM    atorvastatin (LIPITOR) tablet 80 mg  80 mg Oral Daily    azaTHIOprine (IMURAN) tablet 50 mg  50 mg Oral Daily    bumetanide (BUMEX) injection 4 mg  4 mg Intravenous Once    carvedilol (COREG) tablet 6.25 mg  6.25 mg Oral BID With Meals    cefTRIAXone (ROCEPHIN) IVPB (premix in dextrose) 1,000 mg 50 mL  1,000 mg Intravenous Q24H    cinacalcet (SENSIPAR) tablet 30 mg  30 mg Oral Daily    heparin (porcine) 25,000 units in 0.45% NaCl 250 mL infusion (premix)  3-20 Units/kg/hr (Order-Specific) Intravenous Titrated    insulin glargine (LANTUS) subcutaneous injection 12 Units 0.12 mL  12 Units Subcutaneous Q12H JAMARCUS    insulin lispro (HumaLOG) 100 units/mL subcutaneous injection 1-6 Units  1-6 Units Subcutaneous TID AC    insulin lispro (HumaLOG) 100 units/mL subcutaneous injection 1-6 Units  1-6 Units Subcutaneous HS    isosorbide mononitrate (IMDUR) 24 hr tablet 30 mg  30 mg Oral Daily    metoprolol succinate (TOPROL-XL) 24 hr tablet 50 mg  50 mg Oral Daily    NIFEdipine (PROCARDIA XL) 24 hr tablet 120 mg  120 mg Oral Daily    pantoprazole (PROTONIX) EC tablet 40 mg  40 mg Oral Early Morning    predniSONE tablet 5 mg  5 mg Oral Daily    tacrolimus (PROGRAF) capsule 1 mg  1 mg Oral QPM    tacrolimus (PROGRAF) capsule 2 mg  2 mg Oral QAM    and PTA meds:    Medications Prior to Admission   Medication    aspirin 81 mg chewable tablet    atorvastatin (LIPITOR) 80 mg tablet    azaTHIOprine (IMURAN) 50 mg tablet    carvedilol (COREG) 6.25 mg tablet    cinacalcet (SENSIPAR) 30 mg tablet    insulin glargine (Semglee) 100 units/mL subcutaneous injection    isosorbide mononitrate (IMDUR) 30 mg 24 hr tablet    metoprolol succinate (TOPROL-XL) 50 mg 24 hr tablet    multivitamin (THERAGRAN) TABS    NIFEdipine (PROCARDIA XL) 30 mg 24 hr  "tablet    pantoprazole (PROTONIX) 40 mg tablet    predniSONE 5 mg tablet    tacrolimus (PROGRAF) 1 mg capsule    cholecalciferol (VITAMIN D3) 1,000 units tablet    Glucagon, rDNA, (Glucagon Emergency) 1 MG KIT    glucose blood (Contour Next Test) test strip    insulin aspart (NovoLOG) 100 units/mL injection    Insulin Syringe-Needle U-100 31G X 15/64\" 0.5 ML MISC         No Known Allergies    Objective     Intake/Output Summary (Last 24 hours) at 1/8/2024 0905  Last data filed at 1/8/2024 0835  Gross per 24 hour   Intake 50 ml   Output --   Net 50 ml       Invasive Devices:        Physical Exam      I/O last 3 completed shifts:  In: 50 [IV Piggyback:50]  Out: -     Vitals:    01/08/24 0856   BP: (!) 80/42   Pulse: 69   Resp:    Temp:    SpO2: 91%       General Appearance:    No acute distress. Cooperative. Appears stated age.   Head:    Normocephalic. Atraumatic. Normal jaw occlusion.   Eyes:    Lids, conjunctiva normal. No scleral icterus.   Ears:    Normal external ears.   Nose:   Nares normal. No drainage.   Mouth:   Lips, tongue normal. Mucosa normal. Phonation normal.   Neck:   Supple. Symmetrical.   Back:     Symmetric. No CVA tenderness.   Lungs:     Normal respiratory effort. Clear to auscultation bilaterally.   Chest wall:    No tenderness or deformity.   Heart:    Regular rate and rhythm. Normal S1 and S2. No murmur. No JVD. Trace bilateral edema.   Abdomen:     Soft. Non-tender. Bowel sounds active.   Genitourinary:   No Chowdary catheter present.   Extremities:   Extremities normal. Atraumatic. No cyanosis.   Skin:   Warm and dry. No pallor, jaundice, rash, ecchymoses.   Neurologic:   Alert and oriented to person, place, time. No focal deficit.         Current Weight: Weight - Scale: 50 kg (110 lb 3.7 oz)  First Weight: Weight - Scale: 48.8 kg (107 lb 9.4 oz)    Lab Results:  I have personally reviewed pertinent labs.    CBC:   Lab Results   Component Value Date    WBC 13.80 (H) 01/08/2024    HGB 10.3 (L) " "01/08/2024    HCT 32.2 (L) 01/08/2024     (H) 01/08/2024     01/08/2024    RBC 3.14 (L) 01/08/2024    MCH 32.8 01/08/2024    MCHC 32.0 01/08/2024    RDW 15.9 (H) 01/08/2024    MPV 9.2 01/08/2024    NRBC 0 01/07/2024     CMP:   Lab Results   Component Value Date    K 4.6 01/08/2024    CL 95 (L) 01/08/2024    CO2 23 01/08/2024    BUN 47 (H) 01/08/2024    CREATININE 7.05 (H) 01/08/2024    CALCIUM 8.9 01/08/2024    AST 27 01/07/2024    ALT 19 01/07/2024    ALKPHOS 98 01/07/2024    EGFR 8 01/08/2024     Phosphorus: No results found for: \"PHOS\"  Magnesium:   Lab Results   Component Value Date    MG 2.3 01/08/2024     Urinalysis: No results found for: \"COLORU\", \"CLARITYU\", \"SPECGRAV\", \"PHUR\", \"LEUKOCYTESUR\", \"NITRITE\", \"PROTEINUA\", \"GLUCOSEU\", \"KETONESU\", \"BILIRUBINUR\", \"BLOODU\"  Ionized Calcium: No results found for: \"CAION\"  Coagulation:   Lab Results   Component Value Date    INR 1.00 01/07/2024     Troponin: No results found for: \"TROPONINI\"  ABG: No results found for: \"PHART\", \"QJB8EPO\", \"PO2ART\", \"FDE5OBS\", \"X4CVJPYT\", \"BEART\", \"SOURCE\"    Results from last 7 days   Lab Units 01/08/24  0429 01/07/24  1015   POTASSIUM mmol/L 4.6 3.5   CHLORIDE mmol/L 95* 96   CO2 mmol/L 23 27   BUN mg/dL 47* 30*   CREATININE mg/dL 7.05* 5.36*   CALCIUM mg/dL 8.9 9.3   ALK PHOS U/L  --  98   ALT U/L  --  19   AST U/L  --  27       Radiology review:  Procedure: XR chest 1 view portable    Result Date: 1/7/2024  Narrative: CHEST INDICATION:   hypoxia. COMPARISON: Chest radiograph April 27, 2023 EXAM PERFORMED/VIEWS:  XR CHEST PORTABLE FINDINGS: Cardiomediastinal silhouette appears unremarkable. New bibasilar consolidations, right larger than left. Increased prominence of the interstitial markings. No definite pleural effusion. No pneumothorax. Osseous structures appear within normal limits for patient age.     Impression: Pneumonia and pulmonary edema. Workstation performed: [id number]     Procedure: CT chest without " contrast    Result Date: 1/7/2024  Narrative: CT CHEST WITHOUT IV CONTRAST INDICATION:   Pneumonia, effusion or abscess suspected, xray done pna. COMPARISON: CT chest 10/11/2022, CT abdomen and pelvis 10/1/2022 and MRI abdomen 1/20/2022. TECHNIQUE: CT examination of the chest was performed without intravenous contrast. Multiplanar 2D reformatted images were created from the source data. This examination, like all CT scans performed in the [facility], was performed utilizing techniques to minimize radiation dose exposure, including the use of iterative reconstruction and automated exposure control. Radiation dose length product (DLP) for this visit:  193.45 mGy-cm FINDINGS: LUNGS: Bilateral multi lobar consolidation more prominent in the lower lobes. Bilateral multi lobar groundglass opacities, right greater than left. Minimal bibasilar smooth septal thickening. Trace mucus in the trachea. PLEURA: Small bilateral pleural effusions. HEART/GREAT VESSELS: Heart is enlarged.  No pericardial effusion.  Aortic and coronary artery calcification. No thoracic aortic aneurysm. MEDIASTINUM AND DONNY:  Unremarkable. CHEST WALL AND LOWER NECK: Minimal bilateral gynecomastia. VISUALIZED STRUCTURES IN THE UPPER ABDOMEN: Markedly atrophic kidneys with bilateral cysts. Small cysts in the body of the pancreas, the larger of which measures 1.6 cm. No significant interval change since January 2022 allowing for difference in imaging  technique. OSSEOUS STRUCTURES: No acute fracture or osseous destructive lesion identified. Mild degenerative changes of the spine. Mild diffuse renal osteodystrophy.     Impression: Bilateral multi lobar airspace consolidation, groundglass opacities, smooth septal thickening and small bilateral pleural effusions attributed to pulmonary vascular congestion. Underlying superimposed pneumonia is not excluded. Correlate with clinical findings. Small cysts in the partially visualized pancreas  measuring up to 1.6 cm grossly unchanged since January 2022. Advise nonemergent outpatient abdomen MRI/MRCP follow-up to assess 2-year stability. Additional chronic findings and negatives as above. This study demonstrates a significant  finding and was documented as such in Epic for liaison and referring practitioner notification. This study demonstrates a finding requiring imaging follow-up and was documented as such in Epic. Workstation performed: [id number]         Teo Stevenson, DO      This consultation note was produced in part using a dictation device which may document imprecise wording from author's original intent.

## 2024-01-08 NOTE — PROGRESS NOTES
Berto Faria is a 53 y.o. male who is currently ordered Vancomycin IV with management by the Pharmacy Consult service.  Relevant clinical data and objective / subjective history reviewed.  Vancomycin Assessment:  Indication and Goal AUC/Trough: Pneumonia (goal -600, trough >10), -600, trough >10  Clinical Status: worsening  Micro:     Renal Function:  SCr: 7.05 mg/dL  CrCl: 8.5 mL/min  Renal replacement: HD  Days of Therapy: 1  Current Dose: 750mg + 500 mg = 1250mg loading dose  Vancomycin Plan:  New Dosinmg Tues/Thurs/Sat after HD on HD days  Next Level: 0600 on 24  Renal Function Monitoring: Daily BMP and UOP  Pharmacy will continue to follow closely for s/sx of nephrotoxicity, infusion reactions and appropriateness of therapy.  BMP and CBC will be ordered per protocol. We will continue to follow the patient’s culture results and clinical progress daily.    Jovani Freire, Pharmacist

## 2024-01-08 NOTE — QUICK NOTE
The patient has been experiencing hypotension this morning with a current BP of 80/42 mmHg.  The patient received a NSS IV fluid bolus of 500 mL without any improvement of the hypotension.  I discussed the case with Nephrology.  The patient will be transferred to the ICU and initiated on an IV Levophed infusion.  He is currently requiring 8 Lpm of mid-flow oxygen.  Consult Pulmonology/Critical Care Medicine.

## 2024-01-08 NOTE — PROCEDURES
Central Line    Date/Time: 1/8/2024 1:26 PM    Performed by: Tony Rae DO  Authorized by: Tony Rae DO    Patient location:  Bedside  Consent:     Consent obtained:  Written and verbal    Consent given by:  Patient    Risks discussed:  Arterial puncture, incorrect placement, bleeding, infection and pneumothorax    Alternatives discussed:  Delayed treatment, alternative treatment and no treatment  Universal protocol:     Procedure explained and questions answered to patient or proxy's satisfaction: yes      Patient identity confirmed:  Verbally with patient  Pre-procedure details:     Hand hygiene: Hand hygiene performed prior to insertion      Sterile barrier technique: All elements of maximal sterile technique followed      Skin preparation:  ChloraPrep    Skin preparation agent: Skin preparation agent completely dried prior to procedure    Indications:     Central line indications: dialysis    Anesthesia (see MAR for exact dosages):     Anesthesia method:  Local infiltration    Local anesthetic:  Lidocaine 1% w/o epi  Procedure details:     Location:  Right internal jugular    Vessel type: vein      Approach: percutaneous technique used      Patient position:  Flat    Catheter type:  Triple lumen 16cm    Landmarks identified: yes      Ultrasound guidance: yes      Ultrasound image availability:  Images available in PACS    Sterile ultrasound techniques: Sterile gel and sterile probe covers were used      Number of attempts:  1    Successful placement: yes      Catheter tip vessel location: subclavian vein    Post-procedure details:     Post-procedure:  Dressing applied and line sutured    Assessment:  Blood return through all ports  Comments:      CVC placed in the RIJ. No pneumothorax on imaging, however the tip appears to terminate in the right subclavian vein

## 2024-01-08 NOTE — ASSESSMENT & PLAN NOTE
Presents with SOB x 1 day. Associated orthopnea and hypoxia at home. Initially on 6L NC with SpO2 low-80s, after brief period on NRB was weaned to 2-3L NC.   Secondary to pulmonary edema, possible superimposed bacterial pneumonia   Did have mildly elevated carbon monoxide level being treated with supplementation oxygen. VBG with nl pH  With low DRIP score, can c/w Rocephin 1g qd for now   Switch to cefepime and vanc due to severity of infection and septic shock  Now up to 8L on midflow  Check RP2 panel

## 2024-01-08 NOTE — ASSESSMENT & PLAN NOTE
S/p R AKA 2/1/2019 at [facility] given history of right charcot ankle with recurrent infections, osteomyelitis and subsequent bacteremia

## 2024-01-08 NOTE — RESPIRATORY THERAPY NOTE
01/08/24 0941   Respiratory Assessment   Resp Comments at this time, patient is being moved to ICU, he is not in respiratory distress   Oxygen Therapy/Pulse Ox   O2 Device Mid flow nasal cannula   O2 Therapy Oxygen humidified   Nasal Cannula O2 Flow Rate (L/min) 8 L/min   Calculated FIO2 (%) - Nasal Cannula 52   SpO2 92 %   SpO2 Activity At Rest   $ Pulse Oximetry Spot Check Charge Completed

## 2024-01-08 NOTE — ASSESSMENT & PLAN NOTE
Wt Readings from Last 3 Encounters:   01/08/24 50 kg (110 lb 3.7 oz)   08/21/23 51.4 kg (113 lb 6.4 oz)   08/07/23 54.4 kg (120 lb)   Echo 1/8: LVEF 40%, systolic function is moderately reduced, elevated left atrial pressure.  Mild mitral regurgitation.  Home medications: Coreg 6.25 twice daily, Imdur 30 daily, metoprolol 50 daily, nifedipine 120 daily  Plan:  In the setting of hypotension, will hold home medications.  Volume management per nephrology

## 2024-01-08 NOTE — DISCHARGE SUMMARY
[facility]  Discharge- Berto Faria 1970, 53 y.o. male MRN: [id number]  Unit/Bed#:  Encounter: [id number]  Primary Care Provider: Dalton Anderson DO   Date and time admitted to hospital: 1/7/2024  9:58 AM    Acute on chronic diastolic congestive heart failure (HCC)  Assessment & Plan  Wt Readings from Last 3 Encounters:   01/08/24 50 kg (110 lb 3.7 oz)   08/21/23 51.4 kg (113 lb 6.4 oz)   08/07/23 54.4 kg (120 lb)     Presenting with volume overload evidenced on imaging, elevated BNP and clinically  Recent echo with normal EF, repeat echo requesting given possible type II MI vs nonischemic myocardial injury   Now with plans for CVVH  Daily weights,I&Os, Na/fluid restriction           Acute on chronic respiratory failure with hypoxia (HCC)  Assessment & Plan  Presents with SOB x 1 day. Associated orthopnea and hypoxia at home. Initially on 6L NC with SpO2 low-80s, after brief period on NRB was weaned to 2-3L NC.   Secondary to pulmonary edema, possible superimposed bacterial pneumonia   Did have mildly elevated carbon monoxide level being treated with supplementation oxygen. VBG with nl pH  With low DRIP score, can c/w Rocephin 1g qd for now   Switch to cefepime and vanc due to severity of infection and septic shock  Now up to 8L on midflow  Check RP2 panel      Multifocal pneumonia  Assessment & Plan  Plan as under septic shock      ESRD (end stage renal disease) on dialysis (HCC)  Assessment & Plan  Lab Results   Component Value Date    EGFR 8 01/08/2024    EGFR 11 01/07/2024    EGFR 17 04/27/2023    CREATININE 7.05 (H) 01/08/2024    CREATININE 5.36 (H) 01/07/2024    CREATININE 3.68 (H) 04/27/2023     Hx ESRD with R AV fistula which cannot be used currently with septic shock  Now has trialysis catheter in place  HD schedule: T,TH,S  Last session 1/6/24  Daily weights, I&Os, fluid restriction  Nephrology consult appreciated   D/w nephro oncall, planning for session tomorrow  with acute fluid overlaod     S/P AKA (above knee amputation), right (HCC)  Assessment & Plan  S/p R AKA 2/1/2019 at [facility] given history of right charcot ankle with recurrent infections, osteomyelitis and subsequent bacteremia     Elevated troponin  Assessment & Plan  Trop 11,108 > 11,321  EKG with ST & T wave abnormalities in lateral leads that are new compared to prior  Remains chest pain free. SOB resolved with supplemental oxygen  High concern for NSTEMI with ECHO showing newly reduced EF from 60% to now 40%  Continue hep gtt  Appreciate cardiology input, will have to continue care at [place] with their cardiology/HF team    Renal transplant, status post  Assessment & Plan  Hx of failed renal transplant in 2001   Continue daily azathioprine, tacrolimus, prednisone  Following with [place] transplant team currently    Type 1 diabetes mellitus ([facility])  Assessment & Plan  Lab Results   Component Value Date    HGBA1C 7.7 (H) 01/07/2024       Recent Labs     01/07/24  1543 01/07/24  2106 01/08/24  0705 01/08/24  1122   POCGLU 82 101 183* 209*       Blood Sugar Average: Last 72 hrs:  (P) 143.75  Update A1c - 7.7, near goal   Continue lantus 12 units q12  Take Novolog 8 units with meals as needed.   Hold scheduled, cover with SSI alone for now       * Severe sepsis with septic shock (HCC)  Assessment & Plan  Developed 1/8/24 with tachypnea and leukocytosis  Hypotensive this AM requiring levophed due to volume overload preventing patient from receiving the 30 mg/kg fluid bolus  On cefepime and vanc - continue  Secondary to multifocal pneumonia  Blood cultures obtained yesterday, continue to watch for results        Medical Problems       Resolved Problems  Date Reviewed: 1/7/2024   None       Discharging Physician / Practitioner: Felicita Tamayo PA-C  PCP: Dalton Anderson DO  Admission Date:   Admission Orders (From admission, onward)       Ordered        01/07/24 1338  INPATIENT ADMISSION  Once                           Discharge/Transfer Date: 01/08/24    Disposition:   Transfer to: hospitals  Reason for Transfer: need for urgent CVVH, cardiology team  Accepting Provider at [facility]: Dr. Rios    Consultations During Hospital Stay:  Cardiology  Nephrology  Pulmonology/critical care    Procedures Performed:   Trialysis catheter placement    Significant Findings / Test Results:     XR chest portable ICU   Final Result      1. Malpositioned right internal jugular central venous catheter with its distal portions projecting within the right subclavian vein.      2. Interval improvement of bibasilar airspace opacities as compared to radiograph of 1/7/2024. Opacities are again worse on the right.      I personally discussed findings regarding central line placement with JANET MUSCO on 1/8/2024 11:30 AM.                  Workstation performed: [id number]         CT chest without contrast   Final Result      Bilateral multi lobar airspace consolidation, groundglass opacities, smooth septal thickening and small bilateral pleural effusions attributed to pulmonary vascular congestion. Underlying superimposed pneumonia is not excluded. Correlate with clinical    findings.      Small cysts in the partially visualized pancreas measuring up to 1.6 cm grossly unchanged since January 2022. Advise nonemergent outpatient abdomen MRI/MRCP follow-up to assess 2-year stability.      Additional chronic findings and negatives as above.      This study demonstrates a significant  finding and was documented as such in [facility] for liaison and referring practitioner notification.      This study demonstrates a finding requiring imaging follow-up and was documented as such in Epic.         Workstation performed: [id number]         XR chest 1 view portable   Final Result      Pneumonia and pulmonary edema.               Workstation performed: [id number]               Incidental Findings:   none    Test Results Pending at Discharge (will require follow  "up):   none     Outpatient Tests Requested:  none    Complications:  none    Reason for Admission: multifocal pneumonia    Hospital Course:   Berto Faria is a 53 y.o. male patient who originally presented to the hospital on 1/7/2024 due to shortness of breath. Noted himself to be hypoxic on his finger monitor at home. Last dialysis session was 1/6 and he has been compliant with all fluid restrictions. CT scan showed multifocal pneumonia. He was stable yesterday on admission and only requiring 3L O2. He is now up to 8L midflow. Has been maintained on IV abx. He developed hypotension this AM. Patient was brought down to the ICU and initiated on levophed and his BP is now stabilized. ECHO performed showing newly reduced EF of 40% and troponins peaked at 11,900. EKG does show new changes in lateral leads concerning for NSTEMI. He is being maintained on heparin drip currently. Case was discussed with cardiology, pulmonology and nephrology. Patient is recommended for urgent CVVH and was accepted to [facility] ICU.       Please see above list of diagnoses and related plan for additional information.     Condition at Discharge: serious    Discharge Day Visit / Exam:   Subjective:  patient appears acutely ill, somnolent but answering questions.  Vitals: Blood Pressure: 93/51 (01/08/24 1215)  Pulse: 74 (01/08/24 1215)  Temperature: 98.1 °F (36.7 °C) (01/08/24 0710)  Temp Source: Oral (01/07/24 2347)  Respirations: (!) 30 (01/08/24 1215)  Height: 5' 4\" (162.6 cm) (01/08/24 0719)  Weight - Scale: 50 kg (110 lb 3.7 oz) (01/08/24 0719)  SpO2: 95 % (01/08/24 1215)  Exam:   Physical Exam  Vitals and nursing note reviewed.   Constitutional:       Appearance: He is ill-appearing.      Comments: More somnolent but will answer questions appropriately   Cardiovascular:      Rate and Rhythm: Normal rate and regular rhythm.      Pulses: Normal pulses.      Heart sounds: Normal heart sounds.   Pulmonary:      Effort: Respiratory distress " present.   Abdominal:      General: Bowel sounds are normal.   Musculoskeletal:      Right lower leg: Edema present.      Left lower leg: Edema present.          Discussion with Family: Attempted to update  (wife) via phone. Left voicemail.      Discharge Statement:  I spent 55 minutes discharging the patient. This time was spent on the day of discharge. I had direct contact with the patient on the day of discharge. Greater than 50% of the total time was spent examining patient, answering all patient questions, arranging and discussing plan of care with patient as well as directly providing post-discharge instructions.  Additional time then spent on discharge activities.    ** Please Note: This note may have been constructed using a voice recognition system **

## 2024-01-08 NOTE — ASSESSMENT & PLAN NOTE
Lab Results   Component Value Date    HGBA1C 7.7 (H) 01/07/2024       Recent Labs     01/07/24  1543 01/07/24  2106 01/08/24  0705 01/08/24  1122   POCGLU 82 101 183* 209*       Blood Sugar Average: Last 72 hrs:  Type 1 diabetic: Home regimen-aspart 10 units 3 times daily, glargine 10 units every 12.  Current regimen: Glargine 12 units every 12 with sliding scale coverage.

## 2024-01-08 NOTE — PROCEDURES
Central Line    Date/Time: 1/7/2024 9:58 AM    Performed by: Evy Arguelles MD  Authorized by: Evy Arguelles MD    Patient location:  Bedside  Other Assisting Provider: No    Consent:     Consent obtained:  Written    Consent given by:  Patient    Risks discussed:  Arterial puncture, incorrect placement, bleeding, infection, pneumothorax and nerve damage    Alternatives discussed:  Observation, no treatment and alternative treatment  Universal protocol:     Procedure explained and questions answered to patient or proxy's satisfaction: yes      Relevant documents present and verified: yes      Radiology Images displayed and confirmed.  If images not available, report reviewed: yes      Required blood products, implants, devices, and special equipment available: yes      Site/side marked: yes      Immediately prior to procedure, a time out was called: yes      Patient identity confirmed:  Verbally with patient  Pre-procedure details:     Hand hygiene: Hand hygiene performed prior to insertion      Sterile barrier technique: All elements of maximal sterile technique followed      Skin preparation:  2% chlorhexidine  Indications:     Central line indications: medications requiring central line and dialysis    Anesthesia (see MAR for exact dosages):     Anesthesia method:  Local infiltration    Local anesthetic:  Lidocaine 2% w/o epi  Procedure details:     Location:  Right internal jugular    Vessel type: vein      Laterality:  Right    Approach: percutaneous technique used      Patient position:  Trendelenburg    Catheter type:  Triple lumen    Landmarks identified: yes      Ultrasound guidance: yes      Ultrasound image availability:  Not saved    Sterile ultrasound techniques: Sterile gel and sterile probe covers were used      Manometry confirmation: no      Number of attempts:  1    Successful placement: yes      Catheter tip vessel location: subclavian vein    Post-procedure details:     Post-procedure:  Dressing  applied and line sutured    Assessment:  Blood return through all ports, no pneumothorax on x-ray and free fluid flow (Catheter appears to have entered R subclavian vein)    Patient tolerance of procedure:  Tolerated well, no immediate complications  Comments:      CXR shows CVC appears to enter R subclavian vein, wire location was confirmed to be venous prior to dilation.  CVP transduced off of central line was 21 mmHg venous wave-form, VBG pending.  Location was communicated to accepting physician at [place].      Evy Arguelles MD

## 2024-01-08 NOTE — ASSESSMENT & PLAN NOTE
Acute hypoxic respiratory failure on 10 L mid flow oxygen  Secondary to multifocal pneumonia as well as pulmonary vascular congestion.  Plan:  Antibiotics as above.  Management per nephrology  Wean with sat > 90%

## 2024-01-08 NOTE — ASSESSMENT & PLAN NOTE
Wt Readings from Last 3 Encounters:   01/08/24 50 kg (110 lb 3.7 oz)   08/21/23 51.4 kg (113 lb 6.4 oz)   08/07/23 54.4 kg (120 lb)     Presenting with volume overload evidenced on imaging, elevated BNP and clinically  Recent echo with normal EF, repeat echo requesting given possible type II MI vs nonischemic myocardial injury   Now with plans for CVVH  Daily weights,I&Os, Na/fluid restriction

## 2024-01-08 NOTE — CASE MANAGEMENT
Case Management Discharge Planning Note    Patient name Berto Faria  Location / MRN [id number]  : 1970 Date 2024       Current Admission Date: 2024  Current Admission Diagnosis:Severe sepsis with septic shock ([facility])   Patient Active Problem List    Diagnosis Date Noted    Severe sepsis with septic shock ([facility]) 2024    Acute on chronic diastolic congestive heart failure ([facility]) 10/10/2022    Vasovagal syncope 10/01/2022    Elevated MCV 02/10/2021    Hyperbilirubinemia 02/10/2021    Acute on chronic diastolic CHF (congestive heart failure) ([facility]) 02/10/2021    Elevated procalcitonin 02/10/2021    Mitral valve insufficiency 02/10/2021    Abnormal EKG 02/10/2021    Multifocal pneumonia 2020    Acute on chronic respiratory failure with hypoxia ([facility]) 2020    Community acquired pneumonia of right lower lobe of lung 2020    Respiratory distress 2020    Pancreatic lesion 2020    Abnormal CT scan, colon 2020    Chronic kidney disease, unspecified 01/15/2020    Coronary artery disease involving native coronary artery of native heart without angina pectoris 2019    Pre-operative cardiovascular examination 2019    ESRD (end stage renal disease) on dialysis ([facility]) 2019    Hypertensive urgency 2019    Hx of right BKA ([facility]) 10/21/2019    Acute pulmonary edema ([facility]) 10/20/2019    Chronic anemia 10/14/2019    Pulmonary hypertension ([facility]) 10/13/2019    Acute blood loss anemia 2019    Acute respiratory failure with hypoxia ([facility]) 2019    S/P AKA (above knee amputation), right ([facility]) 2019    Depressed left ventricular ejection fraction 2019    Hyperphosphatemia 2018    Hyponatremia 2018    Gastrointestinal hemorrhage 2018    Severe sepsis ([facility]) 2018    Urinary retention 2017    Chronic osteomyelitis of tibia ([facility]) 2017    Elevated troponin 2017    Diarrhea 2017     Cellulitis of ankle 09/23/2017    Non-ST elevation myocardial infarction (NSTEMI), type 2 09/23/2017    Closed fracture of distal end of right tibia with routine healing 07/03/2017    Osteomyelitis (HCC) 12/07/2016    Poor circulation 12/07/2016    Type 1 diabetes mellitus (HCC) 10/26/2016    Renal transplant, status post 10/26/2016    Hyperkalemia 10/26/2016    Immunosuppression ([facility]) 11/04/2014    Hypertension 08/04/2010      LOS (days): 1  Geometric Mean LOS (GMLOS) (days): 3.6  Days to GMLOS:2.6     OBJECTIVE:  Risk of Unplanned Readmission Score: 20.74         Current admission status: Inpatient   Preferred Pharmacy:   [facility] [address] [address]  Phone: [phone] Fax: [phone]    [id number] [address] [address]  Phone: [phone] Fax: [phone]    Primary Care Provider: Dalton Anderson DO    Primary Insurance: MEDICARE  Secondary Insurance: CAPITAL    DISCHARGE DETAILS:patient transferring to [facility] for higher level of care.

## 2024-01-08 NOTE — RESPIRATORY THERAPY NOTE
RT Protocol Note  Berto Faria 53 y.o. male MRN: [id number]  Unit/Bed#: 419-01 Encounter: [id number]    Assessment    Principal Problem:    Acute on chronic respiratory failure with hypoxia (HCC)  Active Problems:    Type 1 diabetes mellitus (HCC)    Renal transplant, status post    Elevated troponin    S/P AKA (above knee amputation), right (HCC)    ESRD (end stage renal disease) on dialysis (HCC)    Pancreatic lesion    Multifocal pneumonia    Acute on chronic diastolic congestive heart failure (HCC)      Home Pulmonary Medications:  none  Home Devices/Therapy:  (none)    Past Medical History:   Diagnosis Date    Bacteremia 12/21/2018    CAD (coronary artery disease)     s/p MARC to LCx, pLAD 2/2018    Cardiac arrest ([facility])     Chronic kidney disease     Diabetes mellitus type 1 (HCC)     Dialysis patient ([facility])     Access in right chest    GI bleed     Hyperlipidemia     Hypertension     Infection at site of external fixator pin (HCC)     MI (myocardial infarction) (HCC)     Myocardial infarction (HCC)     Pneumonia     Last Assessed [id number]    Renal failure     Renal transplant, status post 07/21/2007     Social History     Socioeconomic History    Marital status: /Civil Union     Spouse name: None    Number of children: None    Years of education: None    Highest education level: None   Occupational History    None   Tobacco Use    Smoking status: Never    Smokeless tobacco: Never   Vaping Use    Vaping status: Never Used   Substance and Sexual Activity    Alcohol use: Not Currently     Alcohol/week: 0.0 standard drinks of alcohol    Drug use: Never    Sexual activity: Yes     Partners: Female   Other Topics Concern    None   Social History Narrative    No living will     Social Determinants of Health     Financial Resource Strain: Not on file   Food Insecurity: No Food Insecurity (10/10/2022)    Hunger Vital Sign     Worried About Running Out of Food in the Last Year: Never true     Ran Out of Food  "in the Last Year: Never true   Transportation Needs: No Transportation Needs (10/10/2022)    PRAPARE - Transportation     Lack of Transportation (Medical): No     Lack of Transportation (Non-Medical): No   Physical Activity: Not on file   Stress: Not on file   Social Connections: Not on file   Intimate Partner Violence: Not on file   Housing Stability: Low Risk  (10/10/2022)    Housing Stability Vital Sign     Unable to Pay for Housing in the Last Year: No     Number of Places Lived in the Last Year: 1     Unstable Housing in the Last Year: No       Subjective         Objective    Physical Exam:   Assessment Type: Assess only  General Appearance: Alert, Awake  Respiratory Pattern: Spontaneous, Tachypneic, Symmetrical  Chest Assessment: Chest expansion symmetrical, Trachea midline  Bilateral Breath Sounds: Diminished  R Breath Sounds: Diminished (very diminished RT bases)  L Breath Sounds: Diminished, Crackles (left bases)  O2 Device: midflow 10 l/m    Vitals:  Blood pressure 119/64, pulse 84, temperature 97.9 °F (36.6 °C), resp. rate 20, height 5' 4\" (1.626 m), weight 49.1 kg (108 lb 3.9 oz), SpO2 93%.          Imaging and other studies: I have personally reviewed pertinent reports.      O2 Device: midflow 10 l/m     Plan  Midflow nc 10 l/m with titration to room air as tolerated, flutter valve  for mobilization of secretion  for pneumonia, bronchodilator therapy with  albuterol 0.083%  Q6prn for SOB and wheezing           Resp Comments: pt was on 6 l/m nc  pulse ox 88%, changed pt over to midflow at 10 l/m  pulse ox 93%   "

## 2024-01-08 NOTE — CONSULTS
Consultation - Cardiology Team One  Berto Faria 53 y.o. male  MRN: [id number]  Unit/Bed#: 419-01; Encounter: [id number]    Assessment:  Principal Problem:    Acute on chronic respiratory failure with hypoxia (HCC)  Active Problems:    Type 1 diabetes mellitus (HCC)    Renal transplant, status post    Elevated troponin    S/P AKA (above knee amputation), right (HCC)    ESRD (end stage renal disease) on dialysis (HCC)    Pancreatic lesion    Multifocal pneumonia    Acute on chronic diastolic congestive heart failure (HCC)      HPI:   Berto Faria is a 53-year-old male with PMH of ESRD on HD, CAD w/ stents to LAD and left circumflex, HFpEF,T1DM hx renal transplant in 2001 who presented to [facility] on 1/7/2024 with c/o SOB with Sp02 of 70's on home pulse ox.     In ED, was noted to be hypoxic in low-80s on 6L, improved after time on NRB mask and weaned to 3L NC. Patient also had elevated troponin 11,108> 11,321> 11,919> 811 delta 4hr without any c/o chest pain. Patient received 324 mg of ASA and started on heparin drip.  Initial EKG with ST and T wave abnormalities in lateral leads. CT chest revealing pulmonary edema and possible superimposed pneumonia. Procalcitonin elevated at 0.44, BNP 1, 934, WBC 13.58, carbon monoxide level 2.6, blood cultures pending. Flu/RSV/COVID negative.    Cardiology was consulted for elevated troponin and ST and T wave abnormalities on EKG. Patient follows with Dr. Nieto for outpatient cardiology and was last seen 7/7/2023.    PTA medications: Aspirin 81 mg daily, atorvastatin 80 mg daily, carvedilol 6.25 mg twice daily, Imdur 30 mg daily, metoprolol succinate 50 mg daily, and procardia XL 30 mg daily    Cardiology Testing:  -EKG 1/7/2024: NSR with ST and T wave abnormalities in lateral leads  -Echo 1/8/2024: pending  -Echo 8/7/20 23: LV normal, left ventricular wall thickness is severely increased with severe concentric hypertrophy, EF 60%, left atrium is severely  dilated, right ventricular systolic pressure is mildly elevated.  -Nuclear stress test 9/22/2021: myocardial perfusion is abnormal. Stress end diastolic cavity size is mildly enlarged. Global left ventricular function during stress is normal. During stress   imaging the LVEF is 59%. Global left ventricular function at rest is normal. During rest imaging   the LVEF is 65%.  Compared to prior study on 1/13/2020, there were no significant changes there is a left ventricular defect that is small in size with mild reduction in uptake present in the basal anterolateral location(s) that is partially reversible. The defect appears to be infarction and sg-infarct ischemia. Non-specific ST-T wave changes were noted. There were no arrhythmias during stress. There were no arrhythmias during recovery. The ECG was uninterpretable due to poor ECG quality   -Cardiac catheterization 2/26/2018 at [facility]: MARC proximal LAD and left circumflex artery.    Objective:   Intake/ Output: 50/ no output charted.   Weight: 110 lb 3.7 oz today (108 lb 3.9 oz). Bed scale.   Telemetry: NSR w/ rare PVC's      Today's Plan:  Continue IV Levophed for hypotension  Main goal is to get patient hemodynamically stable therefore, patient will be transferred to [facility] for higher level of care and possible CVVHD   Once patient is stable can consider ischemic work-up      Plan:  Elevated troponin   11,108> 11,321> 11,919> 811 delta 4hr    EKG (1/7/2024): NSR with ST and T wave abnormalities in lateral leads   Continue hep gtt     Coronary artery disease w/ MARC to proximal LAD and left circumflex    Without active chest pain   Continue on aspirin, statin, and BB. Currently on hep gtt    New cardiomyopathy    Echo (1/8/2024): EF 40% from 60% in 2020   Continue GDMT     Acute on chronic diastolic CHF    Echo: as mentioned above   Weight of 110 lb 3.7 oz today (108 lb 3.9 oz). Bed scale.    BNP (1/7/2024): 1, 934   Appears volume overloaded on examination with JVD  and on 8 L mid flow oxygen    Hypertension   BP last documented 96/51 mmHg      Type 1 diabetes melitis with chronic kidney disease on chronic dialysis (Tuesday, Thursday, Saturday)   Nephrology following    May need CVVHD due to hypotension and initiation of IV Levophed     Acute on chronic respiratory failure with hypoxia   8 L/min of mid flow     Multifocal pneumonia   IV ABX- managed by primary team    Pancreatic lesion  S/p renal transplant in 2001        Past Medical History:   Diagnosis Date    Bacteremia 12/21/2018    CAD (coronary artery disease)     s/p MARC to LCx, pLAD 2/2018    Cardiac arrest (HCC)     Chronic kidney disease     Diabetes mellitus type 1 (HCC)     Dialysis patient (HCC)     Access in right chest    GI bleed     Hyperlipidemia     Hypertension     Infection at site of external fixator pin (HCC)     MI (myocardial infarction) (HCC)     Myocardial infarction (HCC)     Pneumonia     Last Assessed [id number]    Renal failure     Renal transplant, status post 07/21/2007       Review of Systems   Constitutional:  Positive for activity change and fatigue. Negative for chills and fever.   Respiratory:  Positive for shortness of breath.    Cardiovascular:  Negative for chest pain, palpitations and leg swelling.   Neurological:  Positive for weakness. Negative for dizziness and light-headedness.   14-point ROS completed and negative except as stated above and/or in the HPI.        Current Facility-Administered Medications:     albuterol inhalation solution 2.5 mg, 2.5 mg, Nebulization, Q6H PRN, Tony Licea,     aspirin chewable tablet 81 mg, 81 mg, Oral, QAM, Rolanda Martinez PA-C    atorvastatin (LIPITOR) tablet 80 mg, 80 mg, Oral, Daily, Rolanda Martinez PA-C, 80 mg at 01/07/24 1552    azaTHIOprine (IMURAN) tablet 50 mg, 50 mg, Oral, Daily, Rolanda Martinez PA-C, 50 mg at 01/07/24 1552    bumetanide (BUMEX) injection 4 mg, 4 mg, Intravenous, Once, Rolanda  Patience Martinez PA-C    carvedilol (COREG) tablet 6.25 mg, 6.25 mg, Oral, BID With Meals, Rolanda Martinez PA-C, 6.25 mg at 01/07/24 1653    cefTRIAXone (ROCEPHIN) IVPB (premix in dextrose) 1,000 mg 50 mL, 1,000 mg, Intravenous, Q24H, Rolanda Martinez PA-C    cinacalcet (SENSIPAR) tablet 30 mg, 30 mg, Oral, Daily, Rolanda Martinez PA-C, 30 mg at 01/07/24 1841    heparin (porcine) 25,000 units in 0.45% NaCl 250 mL infusion (premix), 3-20 Units/kg/hr (Order-Specific), Intravenous, Titrated, Hannah Conte, , Last Rate: 6.3 mL/hr at 01/07/24 2006, 14 Units/kg/hr at 01/07/24 2006    insulin glargine (LANTUS) subcutaneous injection 12 Units 0.12 mL, 12 Units, Subcutaneous, Q12H [facility], Rolanda Martinez PA-C    insulin lispro (HumaLOG) 100 units/mL subcutaneous injection 1-6 Units, 1-6 Units, Subcutaneous, TID AC **AND** Fingerstick Glucose (POCT), , , TID AC, Rolanda Martinez PA-C    insulin lispro (HumaLOG) 100 units/mL subcutaneous injection 1-6 Units, 1-6 Units, Subcutaneous, HS, Rolanda Martinez PA-C    isosorbide mononitrate (IMDUR) 24 hr tablet 30 mg, 30 mg, Oral, Daily, Rolanda Martinez PA-C, 30 mg at 01/07/24 1840    metoprolol succinate (TOPROL-XL) 24 hr tablet 50 mg, 50 mg, Oral, Daily, Rolanda Martinez PA-C, 50 mg at 01/07/24 1601    NIFEdipine (PROCARDIA XL) 24 hr tablet 120 mg, 120 mg, Oral, Daily, Rolanda Martinez PA-C, 120 mg at 01/07/24 1841    pantoprazole (PROTONIX) EC tablet 40 mg, 40 mg, Oral, Early Morning, Rolanda Martinez PA-C, 40 mg at 01/08/24 0527    predniSONE tablet 5 mg, 5 mg, Oral, Daily, Rolanda Martinez PA-C, 5 mg at 01/07/24 1552    sodium chloride 0.9 % bolus 500 mL, 500 mL, Intravenous, Once, Yassine Sanford DO, Last Rate: 500 mL/hr at 01/08/24 0748, 500 mL at 01/08/24 0748    tacrolimus (PROGRAF) capsule 1 mg, 1 mg, Oral, QPM, Rolanda Martinez PA-C, 1 mg at 01/07/24  "1841    tacrolimus (PROGRAF) capsule 2 mg, 2 mg, Oral, Rolanda CAMPBELL PA-C    No Known Allergies  Social History     Socioeconomic History    Marital status: /Civil Union     Spouse name: Not on file    Number of children: Not on file    Years of education: Not on file    Highest education level: Not on file   Occupational History    Not on file   Tobacco Use    Smoking status: Never    Smokeless tobacco: Never   Vaping Use    Vaping status: Never Used   Substance and Sexual Activity    Alcohol use: Not Currently     Alcohol/week: 0.0 standard drinks of alcohol    Drug use: Never    Sexual activity: Yes     Partners: Female   Other Topics Concern    Not on file   Social History Narrative    No living will     Social Determinants of Health     Financial Resource Strain: Not on file   Food Insecurity: No Food Insecurity (10/10/2022)    Hunger Vital Sign     Worried About Running Out of Food in the Last Year: Never true     Ran Out of Food in the Last Year: Never true   Transportation Needs: No Transportation Needs (10/10/2022)    PRAPARE - Transportation     Lack of Transportation (Medical): No     Lack of Transportation (Non-Medical): No   Physical Activity: Not on file   Stress: Not on file   Social Connections: Not on file   Intimate Partner Violence: Not on file   Housing Stability: Low Risk  (10/10/2022)    Housing Stability Vital Sign     Unable to Pay for Housing in the Last Year: No     Number of Places Lived in the Last Year: 1     Unstable Housing in the Last Year: No     Family History   Problem Relation Age of Onset    Diabetes Brother     Coronary artery disease Mother     No Known Problems Father        Vitals:  Blood pressure (!) 81/42, pulse 71, temperature 98.1 °F (36.7 °C), resp. rate 20, height 5' 4\" (1.626 m), weight 50 kg (110 lb 3.7 oz), SpO2 93%.    I/O last 3 completed shifts:  In: 50 [IV Piggyback:50]  Out: -     Weight (last 2 days)       Date/Time Weight    01/08/24 " "07:19:10 50 (110.23)    01/08/24 0600 50 (110.23)    01/07/24 1520 49.1 (108.25)    01/07/24 14:13:33 49.1 (108.25)    01/07/24 1001 48.8 (107.58)          Wt Readings from Last 10 Encounters:   01/08/24 50 kg (110 lb 3.7 oz)   08/21/23 51.4 kg (113 lb 6.4 oz)   08/07/23 54.4 kg (120 lb)   06/23/23 54.4 kg (120 lb)   10/11/22 50.4 kg (111 lb 1.8 oz)   10/03/22 52.7 kg (116 lb 2.9 oz)   08/24/22 52.2 kg (115 lb)   02/10/21 40.3 kg (88 lb 13.5 oz)   11/22/20 48.8 kg (107 lb 9.4 oz)   10/18/20 49.6 kg (109 lb 5.6 oz)       Vitals:    01/08/24 0600 01/08/24 0708 01/08/24 0710 01/08/24 0719   BP:  (!) 80/42 (!) 81/42 (!) 81/42   BP Location:       Pulse:  71 71 71   Resp:       Temp:  98.1 °F (36.7 °C) 98.1 °F (36.7 °C)    TempSrc:       SpO2:  93% 92% 93%   Weight: 50 kg (110 lb 3.7 oz)   50 kg (110 lb 3.7 oz)   Height:    5' 4\" (1.626 m)       Physical Exam  Constitutional:       Appearance: He is ill-appearing.   HENT:      Head: Normocephalic.   Neck:      Vascular: JVD present.   Cardiovascular:      Rate and Rhythm: Normal rate and regular rhythm.      Pulses: Normal pulses.      Heart sounds: Normal heart sounds.   Pulmonary:      Breath sounds: Decreased breath sounds present.      Comments: 8 L mid flow 02  Musculoskeletal:      Cervical back: Normal range of motion.      Right lower leg: No edema.      Left lower leg: No edema.   Skin:     Coloration: Skin is pale.   Neurological:      Mental Status: He is alert and oriented to person, place, and time.       Lines/Drains/Airways       Active Status       None                    Labs & Results:      Results from last 7 days   Lab Units 01/08/24  0429 01/07/24  1015   WBC Thousand/uL 13.80* 13.58*   HEMOGLOBIN g/dL 10.3* 12.0   HEMATOCRIT % 32.2* 37.9   PLATELETS Thousands/uL 215 250         Results from last 7 days   Lab Units 01/08/24  0429 01/07/24  1015   POTASSIUM mmol/L 4.6 3.5   CHLORIDE mmol/L 95* 96   CO2 mmol/L 23 27   BUN mg/dL 47* 30*   CREATININE " mg/dL 7.05* 5.36*   CALCIUM mg/dL 8.9 9.3   ALK PHOS U/L  --  98   ALT U/L  --  19   AST U/L  --  27     Results from last 7 days   Lab Units 01/07/24  1015   INR  1.00     GAVINO Rader

## 2024-01-08 NOTE — CONSULTS
Consultation - Pulmonary Medicine   Berto Faria 53 y.o. male MRN: [id number]  Unit/Bed#:  Encounter: [id number]      Assessment/Plan:  Impression:  Acute Hypoxic Respiratory Failure  Septic Shock  Acute on Chronic HFpEF  NSTEMI Type 2  Multifocal Pulmonary Infiltrates on CT Concerning for Pneumonia  B/L Pleural Effusions  Ischemic Cardiomyopathy  ESRD on HD Tu/Th/Sa  S/P Renal Transplant on Immunosuppression  Type 1 Diabetes Mellitus  Pulmonary Hypertension  Coronary Artery Disease  Essential Hypertension  Hyperlipidemia  H/O R BKA      Plan:  - Patient presented with dyspnea and multifocal infiltrates with B/L pleural effusions on CT Chest.  The findings on his CT chest may be infectious vs 2/2 fluid given elevated troponin and decreased LVEF from prior.  However, given patient is immunocompromised would continue vanc/cefepime/azithro.  PCT is elevated, but this is difficult to interpret in the setting of ESRD.  - Etiology of the shock is likely septic vs less likely cardiogenic, will check VBG off central line for further differentiation  - Titrate O2 requirements for SpO2 > 90%.  - Continue norepinephrine for MAP goal >= 65, hold all anti-hypertensives for now  - Recheck lactate, trend to resolution if elevated  - Check urine antigens and sputum Cx, f/u Bcx and MRSA swab  - Continue to trend WBC and PCT  - Patient is chronically on prednisone 5mg qd, agree with solu-cortef 100mg q8h  - The tip of the R IJ CVC is located in the R subclavian vein (CVP 21 mmHg, venous waveform, wire placement confirmed with US prior to dilation), line will need to be adjusted prior to HD, but can be used to med access.  VBG is also pending as above to further confirm venous placement.  - Glucose checks qAC/HS, continue home lantus 10U BID with ISF/CHO  - Follow up with nephrology/cardiology recommendations  - Will plan to transfer patient as he will require  CRRT  ______________________________________________________________________    HPI:    Berto Faria is a 53 y.o. male with PMHx significant for ESRD s/p renal transplant on HD Tu/Th/Sa, HFpEF/ICM, DM2, CAD, HTN, HLD, PH and H/O R BKA who presented on 1/7/2024 due to dyspnea.  She began feeling poorly 1 day prior to arrival with dyspnea on exertion that progressed to at rest as well as hypoxia on home pulse ox monitoring.  He reports he did not miss any dialysis sessions, and last had dialysis on 1/6/2024.  He denies sick contacts at home, cough, sputum production, hemoptysis, chest pain, palpitations, N/V/D or syncopal episodes prior to arrival.  On admission he was noted to have a CT chest which showed multifocal pulmonary infiltrates versus pulmonary edema with bilateral small effusions.  Troponin was elevated to 11,108 and trended to 11,919, BNP elevated to nearly 2,000.  Initial PCT was 0.44, but trended up to 3.16 in the setting of chronic renal failure.  Initially lactate was negative and the patient was admitted to the general floors.  RP 2 was negative as was rapid testing for RSV, influenza and COVID.  On day one of his admission he was noted to become hypotensive to 80s/50s and was transferred to the ICU for pressor therapy.  He was started on Levophed with improvement in his blood pressures.  Echocardiogram showed newly reduced EF of 40%, elevated LA filling pressures and multiple hypokinetic segments.  A right IJ dialysis catheter with additional port was placed; however, the tip of the catheter terminated in the right subclavian vein, CVP was transduced and was 21 mmHg, and VBG was consistent with venous draw.  Thus far blood cultures MRSA swab are pending, the patient is on Vanco and cefepime.  Nephrology and cardiology has been consulted, and there is concern he will need CRRT at this point so plan is to transfer to facility capable of providing this for him.  He is currently requiring 8L  O2.    Review of Systems:  Full 12 point review of systems was performed. Aside from what was mentioned in the HPI, it is otherwise negative.    Historical Information   Past Medical History:   Diagnosis Date    Bacteremia 12/21/2018    CAD (coronary artery disease)     s/p MARC to LCx, pLAD 2/2018    Cardiac arrest (HCC)     Chronic kidney disease     Diabetes mellitus type 1 (HCC)     Dialysis patient (HCC)     Access in right chest    GI bleed     Hyperlipidemia     Hypertension     Infection at site of external fixator pin (HCC)     MI (myocardial infarction) (HCC)     Myocardial infarction (HCC)     Pneumonia     Last Assessed [id number]    Renal failure     Renal transplant, status post 07/21/2007     Past Surgical History:   Procedure Laterality Date    AMPUTATION Right 02/2019    aboce knee    ANKLE FRACTURE SURGERY      ANKLE HARDWARE REMOVAL Right 7/31/2017    Procedure: REMOVAL HARDWARE ANKLE;  Surgeon: Alvin Leon MD;  Location: [facility];  Service: Orthopedics    ANKLE HARDWARE REMOVAL Right 8/17/2017    Procedure: TIBIA FAILED HARDWARE REMOVAL;  Surgeon: Alvin Leon MD;  Location: [facility];  Service: Orthopedics    CLOSED REDUCTION ANKLE Right 7/3/2017    Procedure: CLOSED REDUCTION DISTAL TIB-FIB AND CASTING VS;  Surgeon: Alvin Leon MD;  Location: [facility];  Service: Orthopedics    CORONARY ANGIOPLASTY WITH STENT PLACEMENT  02/2018    CAD s/p MARC to LCx, pLAD    ESOPHAGOGASTRODUODENOSCOPY N/A 12/20/2018    Procedure: ESOPHAGOGASTRODUODENOSCOPY (EGD) in ICU;  Surgeon: Galileo Wise IV, MD;  Location: [facility];  Service: Gastroenterology    EYE SURGERY      FRACTURE SURGERY      ORIF Rt Ankle    GLUTAMIC ACID DECARBOXYLASE (HISTORICAL)      IR AV FISTULAGRAM/GRAFTOGRAM  4/16/2020    IR AV FISTULAGRAM/GRAFTOGRAM  6/23/2023    IR PICC LINE  8/20/2018    IR TUNNELED DIALYSIS CATHETER CHECK/CHANGE/REPOSITION/ANGIOPLASTY  1/8/2020    IR TUNNELED DIALYSIS CATHETER PLACEMENT  10/21/2019    IR  TUNNELED DIALYSIS CATHETER REMOVAL  5/29/2020    IR VENOUS LINE REMOVAL  10/5/2018    LEG AMPUTATION Right 02/01/2019    Above the knee    NEPHRECTOMY TRANSPLANTED ORGAN      NJ ARTERIOVENOUS ANASTOMOSIS OPEN DIRECT Right 2/11/2020    Procedure: CREATION FISTULA ARTERIOVENOUS (AV) right upper extremity;  Surgeon: Carlos Medina MD;  Location:  MAIN OR;  Service: Vascular    NJ OPEN TREATMENT FRACTURE DISTAL TIBIA FIBULA Right 7/3/2017    Procedure: OPEN REDUCTION W/ INTERNAL FIXATION (ORIF);  Surgeon: Alvin Leon MD;  Location: [facility];  Service: Orthopedics    TOE AMPUTATION Right 10/27/2016    Procedure: AMPUTATION TOE;  Surgeon: Krishna Ruiz DPM;  Location: [facility];  Service:      Social History   Social History     Substance and Sexual Activity   Alcohol Use Not Currently    Alcohol/week: 0.0 standard drinks of alcohol     Social History     Tobacco Use   Smoking Status Never   Smokeless Tobacco Never     Family History:   Family History   Problem Relation Age of Onset    Diabetes Brother     Coronary artery disease Mother     No Known Problems Father      Medications:  The patient's active and prehospital medications were reviewed.  Facility-Administered Medications Ordered in Other Encounters   Medication Dose Route Frequency Provider Last Rate    albuterol  2 puff Inhalation Q4H PRN Nieves Rios MD      [START ON 1/9/2024] aspirin  81 mg Oral QAM Tony Rae DO      [START ON 1/9/2024] atorvastatin  80 mg Oral Daily With Dinner Tony Rae DO      [START ON 1/9/2024] calcitriol  0.5 mcg Oral Once per day on Tuesday Thursday Saturday Tony Rae DO      cefepime  1,000 mg Intravenous Q24H Tony Rae DO 1,000 mg (01/08/24 1600)    [START ON 1/9/2024] cinacalcet  30 mg Oral Daily Tony Rae DO      heparin (porcine)  3-20 Units/kg/hr (Order-Specific) Intravenous Titrated Tony Rae DO 14 Units/kg/hr (01/08/24 1330)    insulin glargine  12 Units Subcutaneous Q12H  "JAMARCUS Tony Rae DO      insulin lispro  1-6 Units Subcutaneous TID AC Tony Rae DO      insulin lispro  1-6 Units Subcutaneous HS Tony Rae DO      norepinephrine  1-30 mcg/min Intravenous Titrated Tony Rae DO 1 mcg/min (01/08/24 1554)    NxStage K 4/Ca 3  20,000 mL Dialysis Continuous Wendy K Doug, DO      ondansetron  4 mg Intravenous Q8H PRN Tony Rae DO      [START ON 1/9/2024] pantoprazole  40 mg Oral Early Morning Tony Rae DO      [START ON 1/9/2024] predniSONE  5 mg Oral Daily Andre Mendoza PA-C      tacrolimus  1 mg Oral QPM Tony Rae DO      [START ON 1/9/2024] tacrolimus  2 mg Oral QAM Tony Rae DO      [START ON 1/9/2024] vancomycin  10 mg/kg Intravenous Once per day on Tuesday Thursday Saturday Tony Rae DO       PhysicalExamination:  Vitals:   Vitals:    01/08/24 1145 01/08/24 1200 01/08/24 1215 01/08/24 1230   BP: 94/50 109/59 93/51 96/51   BP Location:       Pulse: 75 72 74 77   Resp: (!) 35 (!) 28 (!) 30 20   Temp:       TempSrc:       SpO2: 95% 96% 95% 95%   Weight:       Height:         Body mass index is 18.92 kg/m².  Temp  Min: 97.6 °F (36.4 °C)  Max: 100.1 °F (37.8 °C)  IBW (Ideal Body Weight): 59.2 kg    SpO2: 95 %,   SpO2 Activity: At Rest,   O2 Device: Mid flow nasal cannula    Physical Exam  EXAMINATION: /55   Pulse 72   Temp 98.1 °F (36.7 °C)   Resp (!) 30   Ht 5' 4\" (1.626 m)   Wt 50 kg (110 lb 3.7 oz)   SpO2 95%   BMI 18.92 kg/m²   Gen: non-toxic appearing, NAD  HEENT: PERRL, no scleral icterus  Neck: supple, mildly elevated JVP  Chest: no wheezes, bibasilar rales and rhonchi  Cardiac: RRR, no M/R/G, +S1/S2  Abd: Soft, NT/ND  Ext: No pedal edema, 2+ DP in LLE, RLE BKA  Neuro: AAOx3  Skin: Innumerate nevi on thorax, warm, dry    Diagnostic Data:  CBC:   Results from last 7 days   Lab Units 01/08/24  0429 01/07/24  1015   WBC Thousand/uL 13.80* 13.58*   HEMOGLOBIN g/dL 10.3* 12.0   HEMATOCRIT % 32.2* " "37.9   PLATELETS Thousands/uL 215 250     CMP:   Results from last 7 days   Lab Units 01/08/24  0429 01/07/24  1015   SODIUM mmol/L 133* 135   POTASSIUM mmol/L 4.6 3.5   CHLORIDE mmol/L 95* 96   CO2 mmol/L 23 27   BUN mg/dL 47* 30*   CREATININE mg/dL 7.05* 5.36*   CALCIUM mg/dL 8.9 9.3   ALK PHOS U/L  --  98   ALT U/L  --  19   AST U/L  --  27     Microbiology:  Results from last 7 days   Lab Units 01/07/24  1119 01/07/24  1109   BLOOD CULTURE  Received in Microbiology Lab. Culture in Progress. Received in Microbiology Lab. Culture in Progress.     Results from last 7 days   Lab Units 01/08/24  0429 01/07/24  1109   PROCALCITONIN ng/ml 3.16* 0.44*     Results from last 7 days   Lab Units 01/07/24  1648 01/07/24  1015   SARS-COV-2  Not Detected Negative   INFLUENZA A PCR   --  Negative   INFLUENZA B PCR   --  Negative   RESPIRATORY SYNCYTIAL VIRUS  Not Detected  --    RSV PCR   --  Negative     ABG: No results found for: \"PHART\", \"DXN7FEN\", \"PO2ART\", \"QQJ5ZGI\", \"M5SQLGVT\", \"BEART\", \"SOURCE\"    Imaging:  The pertinent imaging studies were reviewed personally on the PACS system:  CXR 2 View -- 1/8/2024  1. Malpositioned right internal jugular central venous catheter with its distal portions projecting within the right subclavian vein.  2. Interval improvement of bibasilar airspace opacities as compared to radiograph of 1/7/2024. Opacities are again worse on the right.    CT Chest WO -- 1/7/2024  Bilateral multi lobar airspace consolidation, groundglass opacities, smooth septal thickening and small bilateral pleural effusions attributed to pulmonary vascular congestion. Underlying superimposed pneumonia is not excluded. Correlate with clinical   findings.     Small cysts in the partially visualized pancreas measuring up to 1.6 cm grossly unchanged since January 2022. Advise nonemergent outpatient abdomen MRI/MRCP follow-up to assess 2-year stability.    Cardiac lab/EKG/telemetry/ECHO:   Results from last 7 days   Lab " Units 01/07/24  1433 01/07/24  1220 01/07/24  1015   HS TNI 0HR ng/L  --   --  11,108*   HS TNI 2HR ng/L  --  11,321*  --    HS TNI 4HR ng/L 11,919*  --   --          Lab Results   Component Value Date    BNP 1,934 (H) 01/07/2024     (H) 04/27/2023       Code Status: Level 1 - Full Code    Evy Arguelles MD

## 2024-01-08 NOTE — ASSESSMENT & PLAN NOTE
Developed 1/8/24 with tachypnea and leukocytosis  Hypotensive this AM requiring levophed due to volume overload preventing patient from receiving the 30 mg/kg fluid bolus  On cefepime and vanc - continue  Secondary to multifocal pneumonia  Blood cultures obtained yesterday, continue to watch for results

## 2024-01-08 NOTE — PLAN OF CARE
Problem: Potential for Falls  Goal: Patient will remain free of falls  Description: INTERVENTIONS:  - Educate patient/family on patient safety including physical limitations  - Instruct patient to call for assistance with activity   - Consult OT/PT to assist with strengthening/mobility   - Keep Call bell within reach  - Keep bed low and locked with side rails adjusted as appropriate  - Keep care items and personal belongings within reach  - Initiate and maintain comfort rounds  - Make Fall Risk Sign visible to staff  - Initiate/Maintain bed alarm  - Obtain necessary fall risk management equipment  - Apply yellow socks and bracelet for high fall risk patients  - Consider moving patient to room near nurses station  Outcome: Progressing     Problem: PAIN - ADULT  Goal: Verbalizes/displays adequate comfort level or baseline comfort level  Description: Interventions:  - Encourage patient to monitor pain and request assistance  - Assess pain using appropriate pain scale  - Administer analgesics based on type and severity of pain and evaluate response  - Implement non-pharmacological measures as appropriate and evaluate response  - Consider cultural and social influences on pain and pain management  - Notify physician/advanced practitioner if interventions unsuccessful or patient reports new pain  Outcome: Progressing     Problem: INFECTION - ADULT  Goal: Absence or prevention of progression during hospitalization  Description: INTERVENTIONS:  - Assess and monitor for signs and symptoms of infection  - Monitor lab/diagnostic results  - Monitor all insertion sites, i.e. indwelling lines, tubes, and drains  - Monitor endotracheal if appropriate and nasal secretions for changes in amount and color  - [place] appropriate cooling/warming therapies per order  - Administer medications as ordered  - Instruct and encourage patient and family to use good hand hygiene technique  - Identify and instruct in appropriate isolation  precautions for identified infection/condition  Outcome: Progressing  Goal: Absence of fever/infection during neutropenic period  Description: INTERVENTIONS:  - Monitor WBC    Outcome: Progressing     Problem: SAFETY ADULT  Goal: Patient will remain free of falls  Description: INTERVENTIONS:  - Educate patient/family on patient safety including physical limitations  - Instruct patient to call for assistance with activity   - Consult OT/PT to assist with strengthening/mobility   - Keep Call bell within reach  - Keep bed low and locked with side rails adjusted as appropriate  - Keep care items and personal belongings within reach  - Initiate and maintain comfort rounds  - Make Fall Risk Sign visible to staff  - Initiate/Maintain bed alarm  - Obtain necessary fall risk management equipment  - Apply yellow socks and bracelet for high fall risk patients  - Consider moving patient to room near nurses station  Outcome: Progressing  Goal: Maintain or return to baseline ADL function  Description: INTERVENTIONS:  -  Assess patient's ability to carry out ADLs; assess patient's baseline for ADL function and identify physical deficits which impact ability to perform ADLs (bathing, care of mouth/teeth, toileting, grooming, dressing, etc.)  - Assess/evaluate cause of self-care deficits   - Assess range of motion  - Assess patient's mobility; develop plan if impaired  - Assess patient's need for assistive devices and provide as appropriate  - Encourage maximum independence but intervene and supervise when necessary  - Involve family in performance of ADLs  - Assess for home care needs following discharge   - Consider OT consult to assist with ADL evaluation and planning for discharge  - Provide patient education as appropriate  Outcome: Progressing  Goal: Maintains/Returns to pre admission functional level  Description: INTERVENTIONS:  - Perform AM-PAC 6 Click Basic Mobility/ Daily Activity assessment daily.  - Set and communicate  daily mobility goal to care team and patient/family/caregiver.   - Collaborate with rehabilitation services on mobility goals if consulted  - Perform Range of Motion 2 times a day.  - Reposition patient every 2 hours.  - Dangle patient 3 times a day  - Stand patient 2 times a day  - Out of bed to chair 2 times a day   - Out of bed for meals 3 times a day  - Out of bed for toileting  - Record patient progress and toleration of activity level   Outcome: Progressing     Problem: DISCHARGE PLANNING  Goal: Discharge to home or other facility with appropriate resources  Description: INTERVENTIONS:  - Identify barriers to discharge w/patient and caregiver  - Arrange for needed discharge resources and transportation as appropriate  - Identify discharge learning needs (meds, wound care, etc.)  - Arrange for interpretive services to assist at discharge as needed  - Refer to Case Management Department for coordinating discharge planning if the patient needs post-hospital services based on physician/advanced practitioner order or complex needs related to functional status, cognitive ability, or social support system  Outcome: Progressing     Problem: Knowledge Deficit  Goal: Patient/family/caregiver demonstrates understanding of disease process, treatment plan, medications, and discharge instructions  Description: Complete learning assessment and assess knowledge base.  Interventions:  - Provide teaching at level of understanding  - Provide teaching via preferred learning methods  Outcome: Progressing     Problem: Prexisting or High Potential for Compromised Skin Integrity  Goal: Skin integrity is maintained or improved  Description: INTERVENTIONS:  - Identify patients at risk for skin breakdown  - Assess and monitor skin integrity  - Assess and monitor nutrition and hydration status  - Monitor labs   - Assess for incontinence   - Turn and reposition patient  - Assist with mobility/ambulation  - Relieve pressure over bony  prominences  - Avoid friction and shearing  - Provide appropriate hygiene as needed including keeping skin clean and dry  - Evaluate need for skin moisturizer/barrier cream  - Collaborate with interdisciplinary team   - Patient/family teaching  - Consider wound care consult   Outcome: Progressing

## 2024-01-08 NOTE — ASSESSMENT & PLAN NOTE
New oxygen requirement 1/7, identified on CT chest   1/7: Bilateral multilevel airspace consolidation, groundglass opacities, septal thickening and small bilateral pleural effusions secondary to volume overloaded as well as pneumonia.  Plan:  Broad-spectrum antibiotics as above  Follow-up micro

## 2024-01-08 NOTE — ASSESSMENT & PLAN NOTE
Lab Results   Component Value Date    EGFR 8 01/08/2024    EGFR 11 01/07/2024    EGFR 17 04/27/2023    CREATININE 7.05 (H) 01/08/2024    CREATININE 5.36 (H) 01/07/2024    CREATININE 3.68 (H) 04/27/2023     Hx ESRD with R AV fistula which cannot be used currently with septic shock  Now has trialysis catheter in place  HD schedule: T,TH,S  Last session 1/6/24  Daily weights, I&Os, fluid restriction  Nephrology consult appreciated   D/w nephro oncall, planning for session tomorrow with acute fluid overlaod

## 2024-01-08 NOTE — ASSESSMENT & PLAN NOTE
Patient presented to the ER 1/7 with fatigue and hypoxemia - worsening tachypnea, leukocytosis and hypotension 1/8  Probable source: PNA on imagine , no skin source, No urine sample.   Plan  Continue Levophed for Blood Pressure Support- did not receive 30cc/kg 2/2 HFrEF  Wean as tolerated for MAP > 65  Continue cefepime and vanc - D2  Follow-up Micro

## 2024-01-08 NOTE — BRIEF OP NOTE (RAD/CATH)
INTERVENTIONAL RADIOLOGY PROCEDURE NOTE    Date: 1/8/2024    Procedure:   RIJ nTDC repositioning      Preoperative diagnosis:   1. ESRD (end stage renal disease) on dialysis (HCC)    2. Chronic diastolic congestive heart failure (HCC)    3. Elevated troponin    4. Abnormal EKG    5. Severe sepsis with septic shock (HCC)    6. Acute on chronic respiratory failure with hypoxia ([facility])         Postoperative diagnosis: Same.    Surgeon: Tony Francisco MD     Assistant: None. No qualified resident was available.    Blood loss: 5 ml    Specimens: none.     Findings:   RIJ nTDC repositioning and replacement.    Complications: None immediate.    Anesthesia: local

## 2024-01-08 NOTE — ASSESSMENT & PLAN NOTE
Presented to the ER on 1/10 with shortness of breath, EKG without ST segment changes.  Elevated troponin on admission.  Patient currently denies any chest pain.  Plan:  NSTEMI possibly related to septic shock.  Continue heparin drip for now.  Continue ASA, Lipitor.  Hold beta-blocker given hypotension.  Allergy consulted, appreciate recs

## 2024-01-08 NOTE — CONSULTS
This is not a new consult. See consult note completed earlier today by Dr. Paulo Stevenson.    Patient transferred for volume overload on 10L.     Will need HD line placement and CRRT start. CRRT ordered. Patient with hx of ESRD.    CRRT: CVVH Qb 250ml, Uf goal -50ml/hr, Qsub 1500ml/hr,  4K bath. Monitor serial labs/lactate.     Nephrology will see in formal follow up tomorrow.

## 2024-01-08 NOTE — ASSESSMENT & PLAN NOTE
History of renal transplant chronically on tacrolimus 1 mg p.o. nightly, 2 mg p.o. every morning, AZA 50 daily, Prednisone 5 Daily  Plan:  Will continue tacrolimus for now and prednisone 5 daily.  Will hold AZA.  Reach out to transplant center for update regarding these medications.

## 2024-01-08 NOTE — ASSESSMENT & PLAN NOTE
Trop 11,108 > 11,321  EKG with ST & T wave abnormalities in lateral leads that are new compared to prior  Remains chest pain free. SOB resolved with supplemental oxygen  High concern for NSTEMI with ECHO showing newly reduced EF from 60% to now 40%  Continue hep gtt  Appreciate cardiology input, will have to continue care at [facility] with their cardiology/HF team

## 2024-01-08 NOTE — ASSESSMENT & PLAN NOTE
Lab Results   Component Value Date    EGFR 8 01/08/2024    EGFR 11 01/07/2024    EGFR 17 04/27/2023    CREATININE 7.05 (H) 01/08/2024    CREATININE 5.36 (H) 01/07/2024    CREATININE 3.68 (H) 04/27/2023   History of failed renal transplant, ESRD on dialysis x 4 years  Dialysis regimen: Tuesday, Thursday, Saturday.  Given hypotension and Levophed requirement, will initiate on CVVH via temporary HD catheter  Plan:  IR consulted for temporary HD catheter given multiple attempts and failed placement.  Nephrology following, appreciate recs-plan for CVVH once access obtained

## 2024-01-08 NOTE — Clinical Note
Patient transported to cath lab via bed with RN. Patient is alert, oriented and without complaint of pain. Arterial line left wrist to pressure. Resp non labored.

## 2024-01-09 PROBLEM — R06.03 RESPIRATORY DISTRESS: Status: RESOLVED | Noted: 2020-08-25 | Resolved: 2024-01-09

## 2024-01-09 PROBLEM — Z89.511 HX OF RIGHT BKA (HCC): Status: RESOLVED | Noted: 2019-10-21 | Resolved: 2024-01-09

## 2024-01-09 PROBLEM — J96.21 ACUTE ON CHRONIC RESPIRATORY FAILURE WITH HYPOXIA (HCC): Status: RESOLVED | Noted: 2020-09-16 | Resolved: 2024-01-09

## 2024-01-09 PROBLEM — I16.0 HYPERTENSIVE URGENCY: Status: RESOLVED | Noted: 2019-12-01 | Resolved: 2024-01-09

## 2024-01-09 LAB
ANION GAP SERPL CALCULATED.3IONS-SCNC: 11 MMOL/L
ANION GAP SERPL CALCULATED.3IONS-SCNC: 12 MMOL/L
ANION GAP SERPL CALCULATED.3IONS-SCNC: 13 MMOL/L
APTT PPP: 51 SECONDS (ref 23–37)
APTT PPP: 55 SECONDS (ref 23–37)
APTT PPP: 63 SECONDS (ref 23–37)
ATRIAL RATE: 73 BPM
ATRIAL RATE: 83 BPM
ATRIAL RATE: 84 BPM
ATRIAL RATE: 84 BPM
BASE EX.OXY STD BLDV CALC-SCNC: 56.8 % (ref 60–80)
BASE EXCESS BLDV CALC-SCNC: -4.4 MMOL/L
BASOPHILS # BLD AUTO: 0.01 THOUSANDS/ÂΜL (ref 0–0.1)
BASOPHILS NFR BLD AUTO: 0 % (ref 0–1)
BUN SERPL-MCNC: 43 MG/DL (ref 5–25)
BUN SERPL-MCNC: 52 MG/DL (ref 5–25)
BUN SERPL-MCNC: 58 MG/DL (ref 5–25)
CA-I BLD-SCNC: 1.14 MMOL/L (ref 1.12–1.32)
CA-I BLD-SCNC: 1.16 MMOL/L (ref 1.12–1.32)
CA-I BLD-SCNC: 1.18 MMOL/L (ref 1.12–1.32)
CALCIUM SERPL-MCNC: 8.7 MG/DL (ref 8.4–10.2)
CALCIUM SERPL-MCNC: 9 MG/DL (ref 8.4–10.2)
CALCIUM SERPL-MCNC: 9.3 MG/DL (ref 8.4–10.2)
CHLORIDE SERPL-SCNC: 100 MMOL/L (ref 96–108)
CHLORIDE SERPL-SCNC: 100 MMOL/L (ref 96–108)
CHLORIDE SERPL-SCNC: 99 MMOL/L (ref 96–108)
CO2 SERPL-SCNC: 21 MMOL/L (ref 21–32)
CO2 SERPL-SCNC: 21 MMOL/L (ref 21–32)
CO2 SERPL-SCNC: 24 MMOL/L (ref 21–32)
CREAT SERPL-MCNC: 3.73 MG/DL (ref 0.6–1.3)
CREAT SERPL-MCNC: 4.44 MG/DL (ref 0.6–1.3)
CREAT SERPL-MCNC: 5.28 MG/DL (ref 0.6–1.3)
EOSINOPHIL # BLD AUTO: 0 THOUSAND/ÂΜL (ref 0–0.61)
EOSINOPHIL NFR BLD AUTO: 0 % (ref 0–6)
ERYTHROCYTE [DISTWIDTH] IN BLOOD BY AUTOMATED COUNT: 16.4 % (ref 11.6–15.1)
GFR SERPL CREATININE-BSD FRML MDRD: 11 ML/MIN/1.73SQ M
GFR SERPL CREATININE-BSD FRML MDRD: 14 ML/MIN/1.73SQ M
GFR SERPL CREATININE-BSD FRML MDRD: 17 ML/MIN/1.73SQ M
GLUCOSE SERPL-MCNC: 118 MG/DL (ref 65–140)
GLUCOSE SERPL-MCNC: 130 MG/DL (ref 65–140)
GLUCOSE SERPL-MCNC: 146 MG/DL (ref 65–140)
GLUCOSE SERPL-MCNC: 163 MG/DL (ref 65–140)
GLUCOSE SERPL-MCNC: 178 MG/DL (ref 65–140)
GLUCOSE SERPL-MCNC: 187 MG/DL (ref 65–140)
GLUCOSE SERPL-MCNC: 225 MG/DL (ref 65–140)
GLUCOSE SERPL-MCNC: 260 MG/DL (ref 65–140)
HCO3 BLDV-SCNC: 21.2 MMOL/L (ref 24–30)
HCT VFR BLD AUTO: 27.6 % (ref 36.5–49.3)
HGB BLD-MCNC: 8.9 G/DL (ref 12–17)
IMM GRANULOCYTES # BLD AUTO: 0.09 THOUSAND/UL (ref 0–0.2)
IMM GRANULOCYTES NFR BLD AUTO: 1 % (ref 0–2)
LYMPHOCYTES # BLD AUTO: 1.16 THOUSANDS/ÂΜL (ref 0.6–4.47)
LYMPHOCYTES NFR BLD AUTO: 9 % (ref 14–44)
MAGNESIUM SERPL-MCNC: 2 MG/DL (ref 1.9–2.7)
MAGNESIUM SERPL-MCNC: 2.1 MG/DL (ref 1.9–2.7)
MAGNESIUM SERPL-MCNC: 2.1 MG/DL (ref 1.9–2.7)
MCH RBC QN AUTO: 33.1 PG (ref 26.8–34.3)
MCHC RBC AUTO-ENTMCNC: 32.2 G/DL (ref 31.4–37.4)
MCV RBC AUTO: 103 FL (ref 82–98)
MONOCYTES # BLD AUTO: 0.95 THOUSAND/ÂΜL (ref 0.17–1.22)
MONOCYTES NFR BLD AUTO: 7 % (ref 4–12)
MRSA NOSE QL CULT: NORMAL
NEUTROPHILS # BLD AUTO: 11.28 THOUSANDS/ÂΜL (ref 1.85–7.62)
NEUTS SEG NFR BLD AUTO: 83 % (ref 43–75)
NRBC BLD AUTO-RTO: 0 /100 WBCS
O2 CT BLDV-SCNC: 8.1 ML/DL
OTHER FIO2: ABNORMAL %
P AXIS: 48 DEGREES
P AXIS: 55 DEGREES
P AXIS: 63 DEGREES
P AXIS: 72 DEGREES
PCO2 BLDV: 41 MM HG (ref 42–50)
PH BLDV: 7.33 [PH] (ref 7.3–7.4)
PHOSPHATE SERPL-MCNC: 2.9 MG/DL (ref 2.7–4.5)
PHOSPHATE SERPL-MCNC: 3 MG/DL (ref 2.7–4.5)
PHOSPHATE SERPL-MCNC: 4.5 MG/DL (ref 2.7–4.5)
PLATELET # BLD AUTO: 187 THOUSANDS/UL (ref 149–390)
PMV BLD AUTO: 9.1 FL (ref 8.9–12.7)
PO2 BLDV: 33.4 MM HG (ref 35–45)
POTASSIUM SERPL-SCNC: 4.2 MMOL/L (ref 3.5–5.3)
POTASSIUM SERPL-SCNC: 4.9 MMOL/L (ref 3.5–5.3)
POTASSIUM SERPL-SCNC: 5.3 MMOL/L (ref 3.5–5.3)
PR INTERVAL: 138 MS
PR INTERVAL: 140 MS
PR INTERVAL: 148 MS
PR INTERVAL: 154 MS
QRS AXIS: 107 DEGREES
QRS AXIS: 34 DEGREES
QRS AXIS: 39 DEGREES
QRS AXIS: 68 DEGREES
QRSD INTERVAL: 100 MS
QRSD INTERVAL: 102 MS
QRSD INTERVAL: 102 MS
QRSD INTERVAL: 96 MS
QT INTERVAL: 386 MS
QT INTERVAL: 386 MS
QT INTERVAL: 398 MS
QT INTERVAL: 440 MS
QTC INTERVAL: 453 MS
QTC INTERVAL: 456 MS
QTC INTERVAL: 470 MS
QTC INTERVAL: 484 MS
RBC # BLD AUTO: 2.69 MILLION/UL (ref 3.88–5.62)
SODIUM SERPL-SCNC: 132 MMOL/L (ref 135–147)
SODIUM SERPL-SCNC: 134 MMOL/L (ref 135–147)
SODIUM SERPL-SCNC: 135 MMOL/L (ref 135–147)
T WAVE AXIS: 125 DEGREES
T WAVE AXIS: 126 DEGREES
T WAVE AXIS: 130 DEGREES
T WAVE AXIS: 161 DEGREES
VENTRICULAR RATE: 73 BPM
VENTRICULAR RATE: 83 BPM
VENTRICULAR RATE: 84 BPM
VENTRICULAR RATE: 84 BPM
WBC # BLD AUTO: 13.49 THOUSAND/UL (ref 4.31–10.16)

## 2024-01-09 PROCEDURE — 80048 BASIC METABOLIC PNL TOTAL CA: CPT | Performed by: NURSE PRACTITIONER

## 2024-01-09 PROCEDURE — 5A1D90Z PERFORMANCE OF URINARY FILTRATION, CONTINUOUS, GREATER THAN 18 HOURS PER DAY: ICD-10-PCS | Performed by: INTERNAL MEDICINE

## 2024-01-09 PROCEDURE — 83735 ASSAY OF MAGNESIUM: CPT | Performed by: NURSE PRACTITIONER

## 2024-01-09 PROCEDURE — 99233 SBSQ HOSP IP/OBS HIGH 50: CPT | Performed by: INTERNAL MEDICINE

## 2024-01-09 PROCEDURE — 90945 DIALYSIS ONE EVALUATION: CPT

## 2024-01-09 PROCEDURE — 82330 ASSAY OF CALCIUM: CPT | Performed by: NURSE PRACTITIONER

## 2024-01-09 PROCEDURE — 90945 DIALYSIS ONE EVALUATION: CPT | Performed by: INTERNAL MEDICINE

## 2024-01-09 PROCEDURE — 93010 ELECTROCARDIOGRAM REPORT: CPT | Performed by: INTERNAL MEDICINE

## 2024-01-09 PROCEDURE — 84100 ASSAY OF PHOSPHORUS: CPT | Performed by: NURSE PRACTITIONER

## 2024-01-09 PROCEDURE — 99232 SBSQ HOSP IP/OBS MODERATE 35: CPT | Performed by: INTERNAL MEDICINE

## 2024-01-09 PROCEDURE — 82948 REAGENT STRIP/BLOOD GLUCOSE: CPT

## 2024-01-09 PROCEDURE — 94760 N-INVAS EAR/PLS OXIMETRY 1: CPT

## 2024-01-09 PROCEDURE — 85025 COMPLETE CBC W/AUTO DIFF WBC: CPT | Performed by: NURSE PRACTITIONER

## 2024-01-09 PROCEDURE — 82805 BLOOD GASES W/O2 SATURATION: CPT | Performed by: INTERNAL MEDICINE

## 2024-01-09 PROCEDURE — 85730 THROMBOPLASTIN TIME PARTIAL: CPT | Performed by: INTERNAL MEDICINE

## 2024-01-09 RX ORDER — VANCOMYCIN HYDROCHLORIDE 750 MG/150ML
15 INJECTION, SOLUTION INTRAVENOUS EVERY 24 HOURS
Status: DISCONTINUED | OUTPATIENT
Start: 2024-01-09 | End: 2024-01-10

## 2024-01-09 RX ORDER — CALCIUM GLUCONATE 20 MG/ML
1 INJECTION, SOLUTION INTRAVENOUS ONCE
Status: COMPLETED | OUTPATIENT
Start: 2024-01-09 | End: 2024-01-09

## 2024-01-09 RX ORDER — PREDNISONE 5 MG/1
5 TABLET ORAL DAILY
Status: DISCONTINUED | OUTPATIENT
Start: 2024-01-10 | End: 2024-01-20 | Stop reason: HOSPADM

## 2024-01-09 RX ORDER — INSULIN LISPRO 100 [IU]/ML
8 INJECTION, SOLUTION INTRAVENOUS; SUBCUTANEOUS
Status: DISCONTINUED | OUTPATIENT
Start: 2024-01-09 | End: 2024-01-11

## 2024-01-09 RX ADMIN — INSULIN LISPRO 1 UNITS: 100 INJECTION, SOLUTION INTRAVENOUS; SUBCUTANEOUS at 16:47

## 2024-01-09 RX ADMIN — PANTOPRAZOLE SODIUM 40 MG: 40 TABLET, DELAYED RELEASE ORAL at 05:51

## 2024-01-09 RX ADMIN — CALCIUM GLUCONATE 1 G: 20 INJECTION, SOLUTION INTRAVENOUS at 17:15

## 2024-01-09 RX ADMIN — INSULIN LISPRO 8 UNITS: 100 INJECTION, SOLUTION INTRAVENOUS; SUBCUTANEOUS at 16:46

## 2024-01-09 RX ADMIN — ATORVASTATIN CALCIUM 80 MG: 80 TABLET, FILM COATED ORAL at 16:02

## 2024-01-09 RX ADMIN — INSULIN LISPRO 2 UNITS: 100 INJECTION, SOLUTION INTRAVENOUS; SUBCUTANEOUS at 07:46

## 2024-01-09 RX ADMIN — Medication 20000 ML: at 18:19

## 2024-01-09 RX ADMIN — Medication 20000 ML: at 07:01

## 2024-01-09 RX ADMIN — TACROLIMUS 1 MG: 1 CAPSULE ORAL at 17:48

## 2024-01-09 RX ADMIN — CEFEPIME 1000 MG: 1 INJECTION, POWDER, FOR SOLUTION INTRAMUSCULAR; INTRAVENOUS at 15:41

## 2024-01-09 RX ADMIN — INSULIN GLARGINE 10 UNITS: 100 INJECTION, SOLUTION SUBCUTANEOUS at 09:46

## 2024-01-09 RX ADMIN — CALCIUM GLUCONATE 1 G: 20 INJECTION, SOLUTION INTRAVENOUS at 11:09

## 2024-01-09 RX ADMIN — CINACALCET 30 MG: 30 TABLET, FILM COATED ORAL at 09:21

## 2024-01-09 RX ADMIN — HYDROCORTISONE SODIUM SUCCINATE 25 MG: 100 INJECTION, POWDER, FOR SOLUTION INTRAMUSCULAR; INTRAVENOUS at 09:21

## 2024-01-09 RX ADMIN — INSULIN LISPRO 8 UNITS: 100 INJECTION, SOLUTION INTRAVENOUS; SUBCUTANEOUS at 07:46

## 2024-01-09 RX ADMIN — TACROLIMUS 2 MG: 1 CAPSULE ORAL at 09:21

## 2024-01-09 RX ADMIN — CALCIUM GLUCONATE 1 G: 20 INJECTION, SOLUTION INTRAVENOUS at 05:51

## 2024-01-09 RX ADMIN — CEFEPIME 1000 MG: 1 INJECTION, POWDER, FOR SOLUTION INTRAMUSCULAR; INTRAVENOUS at 02:24

## 2024-01-09 RX ADMIN — ASPIRIN 81 MG CHEWABLE TABLET 81 MG: 81 TABLET CHEWABLE at 09:21

## 2024-01-09 RX ADMIN — VANCOMYCIN HYDROCHLORIDE 750 MG: 750 INJECTION, SOLUTION INTRAVENOUS at 13:54

## 2024-01-09 RX ADMIN — CALCITRIOL 0.5 MCG: 0.25 CAPSULE, LIQUID FILLED ORAL at 09:21

## 2024-01-09 NOTE — PROGRESS NOTES
[facility]  Progress Note  Name: Berto Faria I  MRN: [id number]  Unit/Bed#: [facility] 518-01 I Date of Admission: 2024   Date of Service: 2024 I Hospital Day: 1    Assessment/Plan   * Septic shock (HCC)  Assessment & Plan  Resolving   Patient presented to the ER 24 with fatigue and hypoxia, suspected source of bacterial pneumonia   Likely component of adrenal insufficiency given chronic, daily prednisone use   Not given 30ml/kg IVF resuscitation in the setting of anuric ESRD      Plan:  Hydrocortisone 25mg q12h   Approximately double home oral prednisone dosing   Maintain MAP 60-65 given ESRD  Tolerating volume removal with CRRT     Multifocal pneumonia  Assessment & Plan  Presented 23 to [facility] with new oxygen requirement, fatigue   Relevant imagin/7/24 CT Chest: Bilateral multilevel airspace consolidation, groundglass opacities, septal thickening and small bilateral pleural effusions secondary to volume overloaded as well as pneumonia.  Initial procalcitonin: 3.16 (24)  Possibly falsely elevated due to ESRD, but had lower levels in the past   Cultures:  24 BCXx2: No growth (24 hours)   23 COVID/Flu/RSV: Negative   23 RP2: Negative   24 MRSA Cx: PENDING   24 BCXx2: PENDING     Plan:   Current Antibiotics: Cefepime/vancomycin   Antibiotic day # 2  Continue to monitor fever and WBC curve   Follow up cultures      Acute on chronic diastolic (congestive) heart failure (HCC)  Assessment & Plan  Likely related to NSTEMI from septic shock and element of stress cardiomyopathy, but cannot rule about possible ischemic element given regional wall changes   24 TTE: LV cavity size is normal. Wall thickness is mildly increased. LVEF 40%. Systolic function is moderately reduced. Following segments are hypokinetic: mid anteroseptal, mid inferior, apical anterior, apical septal, apical inferior and apex.RV normal. Mild MR. Mild TR    Plan:    Continue volume removal with CRRT   Daily weights  Continue holding home afterload reduction agents/AV aubrie blockers given resolving septic shock:   Carvedilol 6.25mg BID  Isosorbide mononitrate 30mg q24h   Metoprolol succinate 50mg q24  Nifedipine 30mg q24 hours   Will need eventual ischemic workup   1000mL fluid restriction   Meds as above for NSTEMI  Cardiology consulted, appreciate recommendations      Acute on chronic respiratory failure with hypoxia ([facility])  Assessment & Plan  Acute hypoxic respiratory failure on 10 L mid flow oxygen  Secondary to multifocal pneumonia as well as pulmonary vascular congestion.  Plan:  Antibiotics as above.  Management per nephrology  Wean with sat > 90%    ESRD (end stage renal disease) ([facility])  Assessment & Plan  Dialysis regimen: Tuesday, Thursday, Saturday via right arm AV fistula   1/8/24: Right IJ temporary HD catheter placement   1/8/24: CRRT initiated     Plan:   Continue CRRT with gentle volume removal given borderline BP   Q6h BMP per CRRT protocol   Cinacalcet 30mg daily   Nephrology consulted, appreciate recommendations     NSTEMI (non-ST elevated myocardial infarction) ([facility])  Assessment & Plan  Presented to [facility] ED with shortness of breath. Troponin and EKG checked as part of initial workup. EKG without acute ischemic changes   NSTEMI likely related to increased demand from acute hypoxic respiratory failure and septic shock   Troponin 50364 > 87141 > 48361     Plan:  Heparin infusion (ACS/low)   Continue for at least 48 hours   Continue to trend troponin until peak  Anticipate drop associated with initiation of CRRT   ASAs 81mg daily   Atorvastatin 80mg qHS   Continue holding home afterload reduction agents/AV aubrie blockers given resolving septic shock:   Carvedilol 6.25mg BID  Isosorbide mononitrate 30mg q24h   Metoprolol succinate 50mg q24  Nifedipine 30mg q24 hours   Will need eventual ischemic workup   Cardiology consulted, appreciate recommendations     Renal  transplant failure and rejection  Assessment & Plan  History of renal transplant chronically on tacrolimus 1 mg p.o. nightly, 2 mg p.o. every morning, AZA 50 daily, Prednisone 5 Daily    Plan:  Continue tacrolimus 2mg qAM, 1mg qPM  Hydrocortisone 25mg q12h   In place of home prednisone 5mg daily given septic shock on arrival   Holding home azathioprine 50mg daily   Nephrology consulted, appreciate recommendations   Reach out to transplant center for update regarding these medications    Acute respiratory failure with hypoxia (HCC)  Assessment & Plan  Secondary to likely bacterial pneumonia and element of volume overload   1/7/24 CT Chest: Bilateral multilevel airspace consolidation, groundglass opacities, septal thickening and small bilateral pleural effusions secondary to volume overloaded as well as pneumonia.      Plan:   Wean supplemental oxygen as able to maintain SpO2 > 90%   Continue volume removal with CRRT  Antibiotic plan detailed above    Continue pulmonary hygiene. Incentive spirometer q1h while awake, encourage coughing and deep breathing. Upright positioning  Suction as needed and closely monitor secretions. Maintain HOB >30 degrees. Q4h oral care with chlorhexidine BID      Type 1 diabetes mellitus on insulin therapy (HCC)  Assessment & Plan  Lab Results   Component Value Date    HGBA1C 7.7 (H) 01/07/2024    HGBA1C 6.5 (H) 02/23/2018   Anticipate hyperglycemia associated with higher steroid dose    Plan:   Continue AC/HS sliding scale algorithm   Start Lispro 8 units TID with meals  Lantus 10 units q12h   Adjust insulin regimen as needed to maintain goal -180  Carb controlled diet   If acidosis is persistent, consider checking beta hydroxybutyrate and initiating insulin infusion if elevated             Disposition: {CC Transfer Levels of Care:85276}    ICU Core Measures     A: Assess, Prevent, and Manage Pain Has pain been assessed? {Yes/No/NA:74060}  Need for changes to pain regimen?  "{Yes/No/NA:42288}   B: Both SAT/SAT  N/A   C: Choice of Sedation {RASS Goal:35350::\"0 Alert and Calm\"}  Need for changes to sedation or analgesia regimen? {Yes/No/NA:93934}   D: Delirium CAM-ICU: {CAM ICU Results:12689}   E: Early Mobility  Plan for early mobility? {Yes/No/NA:23916}   F: Family Engagement Plan for family engagement today? {Yes/No/NA:59343}     { I DISAPPEARING TIP I   Active Antibiotics  cefepime, Intravenous, Stopped at 01/09/24 0325  vancomycin, Intravenous    Current Micro Results I :38619}  {Antibiotic Review:42116}    Review of Invasive Devices:  {I DISAPPEARING TIP I Click here to view LDAs I:43698}    Central access plan: {Document removal plan:53746}      Prophylaxis:  VTE VTE covered by:  heparin (porcine), Intravenous, 14 Units/kg/hr at 01/08/24 1934       Stress Ulcer  covered bypantoprazole (PROTONIX) 40 mg tablet [[id number]] (Long-Term Med), pantoprazole (PROTONIX) EC tablet 40 mg [[id number]]     { I DISAPPEARING TIP I Update Problem List daily:59890}    Significant 24hr Events     24hr events: ***     Subjective   Review of Systems   Objective                            Vitals I/O      Most Recent Min/Max in 24hrs   Temp 97.8 °F (36.6 °C) Temp  Min: 97.6 °F (36.4 °C)  Max: 98.1 °F (36.7 °C)   Pulse 72 Pulse  Min: 69  Max: 78   Resp 18 Resp  Min: 18  Max: 43   /57 BP  Min: 79/46  Max: 114/61   O2 Sat 94 % SpO2  Min: 90 %  Max: 97 %      Intake/Output Summary (Last 24 hours) at 1/9/2024 0458  Last data filed at 1/9/2024 0325  Gross per 24 hour   Intake 505.48 ml   Output 750 ml   Net -244.52 ml       Diet Renal; Renal Restrictive; Yes; Fluid Restriction 1000 ML; No; Consistent Carbohydrate Diet Level 2 (5 carb servings/75 grams CHO/meal)    Invasive Monitoring   {Invasive Device (Optional):02447}        Physical Exam   Critical Care Physical Exam *** (fill out SmartBlock)       Diagnostic Studies    {IDisappearing Data Review I RadiologyI Cardiology:35711}  EKG: ***  Imaging: *** " {Results Review Statement:01885}     Medications:  Scheduled PRN   aspirin, 81 mg, QAM  atorvastatin, 80 mg, Daily With Dinner  calcitriol, 0.5 mcg, Once per day on Tuesday Thursday Saturday  cefepime, 1,000 mg, Q12H  cinacalcet, 30 mg, Daily  hydrocortisone sodium succinate, 25 mg, Q12H JAMARCUS  insulin glargine, 10 Units, Q12H JAMARCUS  insulin lispro, 1-6 Units, TID AC  insulin lispro, 1-6 Units, HS  insulin lispro, 8 Units, TID With Meals  pantoprazole, 40 mg, Early Morning  tacrolimus, 1 mg, QPM  tacrolimus, 2 mg, QAM  vancomycin, 10 mg/kg, Once per day on Tuesday Thursday Saturday      albuterol, 2 puff, Q4H PRN  ondansetron, 4 mg, Q8H PRN       Continuous    heparin (porcine), 3-20 Units/kg/hr (Order-Specific), Last Rate: 14 Units/kg/hr (01/08/24 1934)  norepinephrine, 1-30 mcg/min, Last Rate: Stopped (01/08/24 1647)  NxStage K 4/Ca 3, 20,000 mL         Labs:  { I Disappearing Data Review I Results Review I Micro :72534}  CBC    Recent Labs     01/08/24  0429 01/09/24  0334   WBC 13.80* 13.49*   HGB 10.3* 8.9*   HCT 32.2* 27.6*    187     BMP    Recent Labs     01/08/24  2155 01/09/24  0332   SODIUM 133* 132*   K 5.3 5.3   CL 97 99   CO2 19* 21   AGAP 17 12   BUN 67* 58*   CREATININE 6.67* 5.28*   CALCIUM 8.3* 8.7       Coags    Recent Labs     01/07/24  1015 01/07/24  1906 01/08/24  0906 01/09/24  0332   INR 1.00  --   --   --    PTT 37   < > 78* 51*    < > = values in this interval not displayed.        Additional Electrolytes  Recent Labs     01/08/24  2155 01/09/24  0332   MG 2.2 2.1   PHOS 5.2* 4.5   CAIONIZED 1.08* 1.14          Blood Gas    No recent results  Recent Labs     01/09/24  0332   PHVEN 7.332   XXV6ISG 41.0*   PO2VEN 33.4*   YNR3OZA 21.2*   BEVEN -4.4   [id number] 56.8*    LFTs  Recent Labs     01/07/24  1015   ALT 19   AST 27   ALKPHOS 98   ALB 3.9   TBILI 0.65       Infectious  Recent Labs     01/07/24  1109 01/08/24  0429   PROCALCITONI 0.44* 3.16*     Glucose  Recent Labs     01/08/24  0429  01/08/24  1628 01/08/24  2155 01/09/24  0332   GLUC 146* 471* 241* 260*               {Critical Care Time Delivered (Optional):10499}    GAVINO Price

## 2024-01-09 NOTE — ASSESSMENT & PLAN NOTE
History of renal transplant chronically on tacrolimus 1 mg p.o. nightly, 2 mg p.o. every morning, AZA 50 daily, Prednisone 5 Daily    Plan:  Continue tacrolimus 2mg qAM, 1mg qPM  Hydrocortisone 25mg q12h   In place of home prednisone 5mg daily given septic shock on arrival   Holding home azathioprine 50mg daily   Nephrology consulted, appreciate recommendations   Reach out to transplant center for update regarding these medications

## 2024-01-09 NOTE — ASSESSMENT & PLAN NOTE
Secondary to likely bacterial pneumonia and element of volume overload   1/7/24 CT Chest: Bilateral multilevel airspace consolidation, groundglass opacities, septal thickening and small bilateral pleural effusions secondary to volume overloaded as well as pneumonia.      Plan:   Wean supplemental oxygen as able to maintain SpO2 > 90%   Continue volume removal with CRRT  Antibiotic plan detailed above    Continue pulmonary hygiene. Incentive spirometer q1h while awake, encourage coughing and deep breathing. Upright positioning  Suction as needed and closely monitor secretions. Maintain HOB >30 degrees. Q4h oral care with chlorhexidine BID

## 2024-01-09 NOTE — ASSESSMENT & PLAN NOTE
Lab Results   Component Value Date    HGBA1C 7.7 (H) 01/07/2024    HGBA1C 6.5 (H) 02/23/2018   Anticipate hyperglycemia associated with higher steroid dose    Plan:   Continue AC/HS sliding scale algorithm   Start Lispro 8 units TID with meals  Lantus 10 units q12h   Adjust insulin regimen as needed to maintain goal -180  Carb controlled diet   If acidosis is persistent, consider checking beta hydroxybutyrate and initiating insulin infusion if elevated

## 2024-01-09 NOTE — SEPSIS NOTE
Sepsis Note   Berto Faria 53 y.o. male MRN: [id number]  Unit/Bed#: [facility] 518-01 Encounter: [id number]       Initial Sepsis Screening       Row Name 01/09/24 0446                Is the patient's history suggestive of a new or worsening infection? No  -SP                  User Key  (r) = Recorded By, (t) = Taken By, (c) = Cosigned By      Initials Name Provider Type    SP GAVINO Price Nurse Practitioner                        There is no height or weight on file to calculate BMI.  Wt Readings from Last 1 Encounters:   01/08/24 50 kg (110 lb 3.7 oz)     IBW (Ideal Body Weight): 59.2 kg    Ideal body weight: 59.2 kg (130 lb 8.2 oz)      Patient already being treated for pneumonia. Shock state resolving. No concern for new or worsening infection

## 2024-01-09 NOTE — PROGRESS NOTES
"    Procedure Note - Nephrology   Berto Faria 53 y.o. male MRN: [id number]  [facility] Encounter: [id number]      Assessment / Plan:  ESRD on HD TTS at [facility] -patient on CRRT in light of cardiogenic shock and hypotension initially on pressors, now off pressors.  May allow CRRT to remain off of filter goes down with plans to transition to IHD tomorrow.  May need midodrine for blood pressure support.  -May continue UF as tolerated on CRRT through the evening.  -Target weight 47.5 kg outpatient  Access-AV fistula present and functioning, now with permacath placed for CRRT initiation  Anemia due to other ESRD-on Mircera outpatient  Secondary hyperparathyroidism renal origin-continue Cinacalcet 30 mg daily, calcitriol 0.5 mcg 3 times weekly, sevelamer 1 tablet 4 times daily  History of failed kidney transplant-on azathioprine and tacrolimus as an outpatient, azathioprine on hold, continue tacrolimus home dose for now.  Should have these down titrated per outpatient nephrologist at dialysis unit.  Patient also chronically on prednisone but has received IV hydrocortisone inpatient to assist with potential adrenal insufficiency  Cardiogenic versus septic shock in setting of multifocal pneumonia-on antibiotics per critical care team  Elevated troponin-cardiology following, to begin aspirin with high intensity statin therapy  Acute on chronic diastolic CHF-UF as tolerated on CRRT, continue oral fluid restriction, cardiology follows  NSTEMI-on heparin infusion, cardiology following        Subjective:   Patient seen and examined by me on CRRT at approximate 10:58 AM.  Blood pressure 93/65, patient denies dizziness.  Denies chest pain or shortness of breath.  UF goal -50 mL/h.  Patient off pressors.      Objective:     Vitals: Blood pressure 120/62, pulse 80, temperature 97.6 °F (36.4 °C), temperature source Oral, resp. rate 22, height 5' 4\" (1.626 m), weight 48.5 kg (106 lb 14.8 oz), SpO2 95%.,Body " mass index is 18.35 kg/m².Temp (24hrs), Av.6 °F (36.4 °C), Min:97.4 °F (36.3 °C), Max:98 °F (36.7 °C)      Weight (last 2 days)       Date/Time Weight    24 0800 48.5 (106.92)    24 0556 48.5 (106.92)              Intake/Output Summary (Last 24 hours) at 2024 1633  Last data filed at 2024 1525  Gross per 24 hour   Intake 846.7 ml   Output 2153 ml   Net -1306.3 ml     I/O last 24 hours:  In: 887.2 [P.O.:320; I.V.:297.2; IV Piggyback:270]  Out: 2153 [Other:2153]        Physical Exam:   Physical Exam  Vitals reviewed.   Constitutional:       General: He is not in acute distress.     Appearance: Normal appearance. He is well-developed. He is not diaphoretic.   HENT:      Head: Normocephalic and atraumatic.      Nose: Nose normal.      Mouth/Throat:      Mouth: Mucous membranes are moist.      Pharynx: No oropharyngeal exudate.   Eyes:      General: No scleral icterus.        Right eye: No discharge.         Left eye: No discharge.   Neck:      Thyroid: No thyromegaly.   Cardiovascular:      Rate and Rhythm: Normal rate and regular rhythm.      Heart sounds: Normal heart sounds.   Pulmonary:      Effort: Pulmonary effort is normal.      Breath sounds: Normal breath sounds. No wheezing or rales.   Abdominal:      General: Bowel sounds are normal. There is no distension.      Palpations: Abdomen is soft.      Tenderness: There is no abdominal tenderness.   Musculoskeletal:         General: No swelling.      Cervical back: Neck supple.      Comments: S/p R AKA   Lymphadenopathy:      Cervical: No cervical adenopathy.   Skin:     General: Skin is warm and dry.      Coloration: Skin is not jaundiced.      Findings: No rash.   Neurological:      General: No focal deficit present.      Mental Status: He is alert.      Comments: awake   Psychiatric:         Mood and Affect: Mood normal.         Behavior: Behavior normal.         Invasive Devices       Peripheral Intravenous Line  Duration              Peripheral IV 01/07/24 Distal;Left;Upper;Ventral (anterior) Arm 2 days    Peripheral IV 01/08/24 Left;Ventral (anterior) Forearm <1 day              Line  Duration             Hemodialysis AV Fistula Right Upper arm -- days              Hemodialysis Catheter  Duration             HD Permanent Double Catheter <1 day                    Medications:    Scheduled Meds:  Current Facility-Administered Medications   Medication Dose Route Frequency Provider Last Rate    albuterol  2 puff Inhalation Q4H PRN Nieves Rios MD      aspirin  81 mg Oral QAM Tony Rae, DO      atorvastatin  80 mg Oral Daily With Dinner Tony Rae, DO      calcitriol  0.5 mcg Oral Once per day on Tuesday Thursday Saturday Tony Rae, DO      cefepime  1,000 mg Intravenous Q12H GAVINO Price Stopped (01/09/24 1624)    cinacalcet  30 mg Oral Daily Tony Rae, DO      heparin (porcine)  3-20 Units/kg/hr (Order-Specific) Intravenous Titrated Tony Rae, DO 16 Units/kg/hr (01/09/24 5531)    insulin glargine  10 Units Subcutaneous Q12H [facility] GAVINO Price      insulin lispro  1-6 Units Subcutaneous TID AC Tony Rae, DO      insulin lispro  1-6 Units Subcutaneous HS Tony Rae, DO      insulin lispro  8 Units Subcutaneous TID With Meals GAVINO Price      norepinephrine  1-30 mcg/min Intravenous Titrated Tony Rae, DO Stopped (01/08/24 1647)    NxStage K 4/Ca 3  20,000 mL Dialysis Continuous Wendy K Doug, DO      ondansetron  4 mg Intravenous Q8H PRN Tony Rae, DO      pantoprazole  40 mg Oral Early Morning Tony Rae, DO      [START ON 1/10/2024] predniSONE  5 mg Oral Daily Andre Mendoza PA-C      tacrolimus  1 mg Oral QPM Tony Rae, DO      tacrolimus  2 mg Oral QAM Tony Rae, DO      vancomycin  15 mg/kg Intravenous Q24H Nieves Rios MD Stopped (01/09/24 1514)       PRN Meds:.  albuterol    ondansetron    Continuous Infusions:heparin  (porcine), 3-20 Units/kg/hr (Order-Specific), Last Rate: 16 Units/kg/hr (01/09/24 0526)  norepinephrine, 1-30 mcg/min, Last Rate: Stopped (01/08/24 1647)  NxStage K 4/Ca 3, 20,000 mL            LAB RESULTS:      Results from last 7 days   Lab Units 01/09/24  1005 01/09/24  0334 01/09/24  0332 01/08/24  2155 01/08/24  1628 01/08/24  0429 01/07/24  1015   WBC Thousand/uL  --  13.49*  --   --   --  13.80* 13.58*   HEMOGLOBIN g/dL  --  8.9*  --   --   --  10.3* 12.0   HEMATOCRIT %  --  27.6*  --   --   --  32.2* 37.9   PLATELETS Thousands/uL  --  187  --   --   --  215 250   NEUTROS PCT %  --  83*  --   --   --   --  81*   LYMPHS PCT %  --  9*  --   --   --   --  10*   LYMPHO PCT %  --   --   --   --   --  12*  --    MONOS PCT %  --  7  --   --   --   --  8   MONO PCT %  --   --   --   --   --  9  --    EOS PCT %  --  0  --   --   --  0 1   POTASSIUM mmol/L 4.2  --  5.3 5.3 5.3 4.6 3.5   CHLORIDE mmol/L 100  --  99 97 92* 95* 96   CO2 mmol/L 24  --  21 19* 15* 23 27   BUN mg/dL 52*  --  58* 67* 64* 47* 30*   CREATININE mg/dL 4.44*  --  5.28* 6.67* 7.21* 7.05* 5.36*   CALCIUM mg/dL 9.0  --  8.7 8.3* 7.8* 8.9 9.3   ALK PHOS U/L  --   --   --   --   --   --  98   ALT U/L  --   --   --   --   --   --  19   AST U/L  --   --   --   --   --   --  27   MAGNESIUM mg/dL 2.0  --  2.1 2.2 2.2 2.3  --    PHOSPHORUS mg/dL 3.0  --  4.5 5.2* 5.5*  --   --        CUTURES:  Lab Results   Component Value Date    BLOODCX Received in Microbiology Lab. Culture in Progress. 01/08/2024    BLOODCX Received in Microbiology Lab. Culture in Progress. 01/08/2024    BLOODCX No Growth at 24 hrs. 01/07/2024    BLOODCX No Growth at 24 hrs. 01/07/2024    BLOODCX No Growth After 5 Days. 04/27/2023    BLOODCX No Growth After 5 Days. 04/27/2023    BLOODCX No Growth After 5 Days. 10/10/2022    BLOODCX No Growth After 5 Days. 10/10/2022    URINECX No Growth <1000 cfu/mL 02/23/2019    URINECX No Growth <1000 cfu/mL 07/27/2016                 Portions of the  "record may have been created with voice recognition software. Occasional wrong word or \"sound a like\" substitutions may have occurred due to the inherent limitations of voice recognition software. Read the chart carefully and recognize, using context, where substitutions have occurred.If you have any questions, please contact the dictating provider.    "

## 2024-01-09 NOTE — PROGRESS NOTES
[facility]  Progress Note  Name: Berto Faria I  MRN: [id number]  Unit/Bed#: [facility] 518-01 I Date of Admission: 2024   Date of Service: 2024 I Hospital Day: 1    Assessment/Plan   * Septic shock (HCC)  Assessment & Plan  Resolving   Patient presented to the ER 24 with fatigue and hypoxia, suspected source of bacterial pneumonia   Likely component of adrenal insufficiency given chronic, daily prednisone use   Not given 30ml/kg IVF resuscitation in the setting of anuric ESRD      Plan:  Hydrocortisone 25mg q12h   Approximately double home oral prednisone dosing   Maintain MAP 60-65 given ESRD  Tolerating volume removal with CRRT     Multifocal pneumonia  Assessment & Plan  Presented 23 to [facility] with new oxygen requirement, fatigue   Relevant imagin/7/24 CT Chest: Bilateral multilevel airspace consolidation, groundglass opacities, septal thickening and small bilateral pleural effusions secondary to volume overloaded as well as pneumonia.  Initial procalcitonin: 3.16 (24)  Possibly falsely elevated due to ESRD, but had lower levels in the past   Cultures:  24 BCXx2: No growth (24 hours)   23 COVID/Flu/RSV: Negative   23 RP2: Negative   24 MRSA Cx: PENDING   24 BCXx2: PENDING     Plan:   Current Antibiotics: Cefepime/vancomycin   Antibiotic day # 2  Continue to monitor fever and WBC curve   Follow up cultures      Acute on chronic diastolic (congestive) heart failure (HCC)  Assessment & Plan  Likely related to NSTEMI from septic shock and element of stress cardiomyopathy, but cannot rule about possible ischemic element given regional wall changes   24 TTE: LV cavity size is normal. Wall thickness is mildly increased. LVEF 40%. Systolic function is moderately reduced. Following segments are hypokinetic: mid anteroseptal, mid inferior, apical anterior, apical septal, apical inferior and apex.RV normal. Mild MR. Mild TR    Plan:    Continue volume removal with CRRT   Daily weights  Continue holding home afterload reduction agents/AV aubrie blockers given resolving septic shock:   Carvedilol 6.25mg BID  Isosorbide mononitrate 30mg q24h   Metoprolol succinate 50mg q24  Nifedipine 30mg q24 hours   Will need eventual ischemic workup   1000mL fluid restriction   Meds as above for NSTEMI  Cardiology consulted, appreciate recommendations      NSTEMI (non-ST elevated myocardial infarction) (HCC)  Assessment & Plan  Presented to [facility] ED with shortness of breath. Troponin and EKG checked as part of initial workup. EKG without acute ischemic changes   NSTEMI likely related to increased demand from acute hypoxic respiratory failure and septic shock   Troponin 38017 > 20209 > 84486     Plan:  Heparin infusion (ACS/low)   Continue for at least 48 hours   Continue to trend troponin until peak  Anticipate drop associated with initiation of CRRT   ASAs 81mg daily   Atorvastatin 80mg qHS   Continue holding home afterload reduction agents/AV aubrie blockers given resolving septic shock:   Carvedilol 6.25mg BID  Isosorbide mononitrate 30mg q24h   Metoprolol succinate 50mg q24  Nifedipine 30mg q24 hours   Will need eventual ischemic workup   Cardiology consulted, appreciate recommendations     Acute on chronic respiratory failure with hypoxia (HCC)-resolved as of 1/9/2024  Assessment & Plan  Acute hypoxic respiratory failure on 10 L mid flow oxygen  Secondary to multifocal pneumonia as well as pulmonary vascular congestion.  Plan:  Antibiotics as above.  Management per nephrology  Wean with sat > 90%    ESRD (end stage renal disease) ([facility])  Assessment & Plan  Dialysis regimen: Tuesday, Thursday, Saturday via right arm AV fistula   1/8/24: Right IJ temporary HD catheter placement   1/8/24: CRRT initiated     Plan:   Continue CRRT with gentle volume removal given borderline BP   Q6h BMP per CRRT protocol   Cinacalcet 30mg daily   Nephrology consulted, appreciate  recommendations     Type 1 diabetes mellitus on insulin therapy (HCC)  Assessment & Plan  Lab Results   Component Value Date    HGBA1C 7.7 (H) 01/07/2024    HGBA1C 6.5 (H) 02/23/2018   Anticipate hyperglycemia associated with higher steroid dose    Plan:   Continue AC/HS sliding scale algorithm   Start Lispro 8 units TID with meals  Lantus 10 units q12h   Adjust insulin regimen as needed to maintain goal -180  Carb controlled diet   If acidosis is persistent, consider checking beta hydroxybutyrate and initiating insulin infusion if elevated    Acute respiratory failure with hypoxia (HCC)  Assessment & Plan  Secondary to likely bacterial pneumonia and element of volume overload   1/7/24 CT Chest: Bilateral multilevel airspace consolidation, groundglass opacities, septal thickening and small bilateral pleural effusions secondary to volume overloaded as well as pneumonia.      Plan:   Wean supplemental oxygen as able to maintain SpO2 > 90%   Continue volume removal with CRRT  Antibiotic plan detailed above    Continue pulmonary hygiene. Incentive spirometer q1h while awake, encourage coughing and deep breathing. Upright positioning  Suction as needed and closely monitor secretions. Maintain HOB >30 degrees. Q4h oral care with chlorhexidine BID      Renal transplant failure and rejection  Assessment & Plan  History of renal transplant chronically on tacrolimus 1 mg p.o. nightly, 2 mg p.o. every morning, AZA 50 daily, Prednisone 5 Daily    Plan:  Continue tacrolimus 2mg qAM, 1mg qPM  Hydrocortisone 25mg q12h   In place of home prednisone 5mg daily given septic shock on arrival   Holding home azathioprine 50mg daily   Nephrology consulted, appreciate recommendations   Reach out to transplant center for update regarding these medications    Chronic anemia  Assessment & Plan  Secondary to chronic disease and ESRD  Baseline hemoglobin: 9.6 - 11.8  Initial hemoglobin: 12.0 (1/7/24)  Last hemoglobin: 8.9  (1/9/24)    Plan:   Transfuse as indicated in the setting of hemodynamic instability or signs of active bleeding  CBC in AM    S/P AKA (above knee amputation), right (HCC)  Assessment & Plan  Secondary to chronic osteomyelitis     Plan:   PT/OT when appropriate   Offloading and frequent turns              Disposition: Critical care    ICU Core Measures     A: Assess, Prevent, and Manage Pain Has pain been assessed? Yes  Need for changes to pain regimen? No   B: Both SAT/SAT  N/A   C: Choice of Sedation RASS Goal: 0 Alert and Calm or N/A patient not on sedation  Need for changes to sedation or analgesia regimen? NA   D: Delirium CAM-ICU: Negative   E: Early Mobility  Plan for early mobility? Yes   F: Family Engagement Plan for family engagement today? Yes       Antibiotic Review: Awaiting culture results.  and Continue broad spectrum secondary to severity of illness.     Review of Invasive Devices:      Central access plan: HD cath in place.  Plan continue for CRRT      Prophylaxis:  VTE VTE covered by:  heparin (porcine), Intravenous, 14 Units/kg/hr at 01/08/24 1934       Stress Ulcer  covered bypantoprazole (PROTONIX) 40 mg tablet [[id number]] (Long-Term Med), pantoprazole (PROTONIX) EC tablet 40 mg [[id number]]         Significant 24hr Events     24hr events: Weaned off levophed. CRRT with negative fluid balance without complication.      Subjective   Review of Systems   Constitutional:  Positive for fatigue.   Respiratory:  Positive for cough and shortness of breath.    Cardiovascular:  Negative for chest pain.   Gastrointestinal:  Negative for nausea.   Neurological:  Negative for dizziness.      Objective                            Vitals I/O      Most Recent Min/Max in 24hrs   Temp 97.8 °F (36.6 °C) Temp  Min: 97.6 °F (36.4 °C)  Max: 98.1 °F (36.7 °C)   Pulse 72 Pulse  Min: 69  Max: 78   Resp 18 Resp  Min: 18  Max: 43   /57 BP  Min: 79/46  Max: 114/61   O2 Sat 94 % SpO2  Min: 90 %  Max: 97 %       Intake/Output Summary (Last 24 hours) at 1/9/2024 0503  Last data filed at 1/9/2024 0325  Gross per 24 hour   Intake 505.48 ml   Output 750 ml   Net -244.52 ml       Diet Renal; Renal Restrictive; Yes; Fluid Restriction 1000 ML; No; Consistent Carbohydrate Diet Level 2 (5 carb servings/75 grams CHO/meal)    Invasive Monitoring           Physical Exam   Physical Exam  Vitals and nursing note reviewed.   Eyes:      Conjunctiva/sclera: Conjunctivae normal.      Pupils: Pupils are equal, round, and reactive to light.   Skin:     General: Skin is warm and dry.      Capillary Refill: Capillary refill takes less than 2 seconds.   HENT:      Mouth/Throat:      Lips: Pink.      Mouth: Mucous membranes are moist.   Neck:      Vascular: Central line present.     Cardiovascular:      Rate and Rhythm: Normal rate and regular rhythm.      Pulses:           Radial pulses are 2+ on the right side and 2+ on the left side.        Femoral pulses are 2+ on the right side.       Dorsalis pedis pulses are 1+ on the left side. Right dorsalis pedis pulse not accessible.         Right posterior tibial pulse not accessible.      Heart sounds: Normal heart sounds.      Arteriovenous access: Right arteriovenous access is present.  Musculoskeletal:      Left lower leg: Edema present.      Right Lower Extremity: Right leg is amputated above knee.   Abdominal: General: There is no distension.      Palpations: Abdomen is soft.      Tenderness: There is no abdominal tenderness.   Constitutional:       General: He is awake. He is not in acute distress.     Appearance: Normal appearance. He is well-developed and well-nourished.      Interventions: Nasal cannula in place.   Pulmonary:      Effort: Tachypnea present.      Breath sounds: Examination of the right-lower field reveals decreased breath sounds. Examination of the left-lower field reveals decreased breath sounds. Decreased breath sounds present.   Psychiatric:         Behavior: Behavior is  cooperative.   Neurological:      General: No focal deficit present.      Mental Status: He is alert and oriented to person, place, and time.      GCS: GCS eye subscore is 4. GCS verbal subscore is 5. GCS motor subscore is 6.               Patellar reflexes are 1+ on the left side.           Diagnostic Studies      EKG: NSR   Imaging: No new imaging      Medications:  Scheduled PRN   aspirin, 81 mg, QAM  atorvastatin, 80 mg, Daily With Dinner  calcitriol, 0.5 mcg, Once per day on Tuesday Thursday Saturday  cefepime, 1,000 mg, Q12H  cinacalcet, 30 mg, Daily  hydrocortisone sodium succinate, 25 mg, Q12H JAMARCUS  insulin glargine, 10 Units, Q12H JAMARCUS  insulin lispro, 1-6 Units, TID AC  insulin lispro, 1-6 Units, HS  insulin lispro, 8 Units, TID With Meals  pantoprazole, 40 mg, Early Morning  tacrolimus, 1 mg, QPM  tacrolimus, 2 mg, QAM  vancomycin, 10 mg/kg, Once per day on Tuesday Thursday Saturday      albuterol, 2 puff, Q4H PRN  ondansetron, 4 mg, Q8H PRN       Continuous    heparin (porcine), 3-20 Units/kg/hr (Order-Specific), Last Rate: 14 Units/kg/hr (01/08/24 1934)  norepinephrine, 1-30 mcg/min, Last Rate: Stopped (01/08/24 1647)  NxStage K 4/Ca 3, 20,000 mL         Labs:    CBC    Recent Labs     01/08/24  0429 01/09/24  0334   WBC 13.80* 13.49*   HGB 10.3* 8.9*   HCT 32.2* 27.6*    187     BMP    Recent Labs     01/08/24  2155 01/09/24  0332   SODIUM 133* 132*   K 5.3 5.3   CL 97 99   CO2 19* 21   AGAP 17 12   BUN 67* 58*   CREATININE 6.67* 5.28*   CALCIUM 8.3* 8.7       Coags    Recent Labs     01/07/24  1015 01/07/24  1906 01/08/24  0906 01/09/24  0332   INR 1.00  --   --   --    PTT 37   < > 78* 51*    < > = values in this interval not displayed.        Additional Electrolytes  Recent Labs     01/08/24 2155 01/09/24  0332   MG 2.2 2.1   PHOS 5.2* 4.5   CAIONIZED 1.08* 1.14          Blood Gas    No recent results  Recent Labs     01/09/24  0332   PHVEN 7.332   AYO2RYW 41.0*   PO2VEN 33.4*   [id number] 21.2*    BEVEN -4.4   [id number] 56.8*    LFTs  Recent Labs     01/07/24  1015   ALT 19   AST 27   ALKPHOS 98   ALB 3.9   TBILI 0.65       Infectious  Recent Labs     01/07/24  1109 01/08/24  0429   PROCALCITONI 0.44* 3.16*     Glucose  Recent Labs     01/08/24  0429 01/08/24  1628 01/08/24  2155 01/09/24  0332   GLUC 146* 471* 241* 260*                   GAVINO Price

## 2024-01-09 NOTE — ASSESSMENT & PLAN NOTE
Presented 23 to [facility] with new oxygen requirement, fatigue   Relevant imagin/7/24 CT Chest: Bilateral multilevel airspace consolidation, groundglass opacities, septal thickening and small bilateral pleural effusions secondary to volume overloaded as well as pneumonia.  Initial procalcitonin: 3.16 (24)  Possibly falsely elevated due to ESRD, but had lower levels in the past   Cultures:  24 BCXx2: No growth (24 hours)   23 COVID/Flu/RSV: Negative   23 RP2: Negative   24 MRSA Cx: PENDING   24 BCXx2: PENDING     Plan:   Current Antibiotics: Cefepime/vancomycin   Antibiotic day # 2  Continue to monitor fever and WBC curve   Follow up cultures

## 2024-01-09 NOTE — ASSESSMENT & PLAN NOTE
Resolving   Patient presented to the ER 1/7/24 with fatigue and hypoxia, suspected source of bacterial pneumonia   Likely component of adrenal insufficiency given chronic, daily prednisone use   Not given 30ml/kg IVF resuscitation in the setting of anuric ESRD      Plan:  Hydrocortisone 25mg q12h   Approximately double home oral prednisone dosing   Maintain MAP 60-65 given ESRD  Tolerating volume removal with CRRT

## 2024-01-09 NOTE — PLAN OF CARE
Problem: Prexisting or High Potential for Compromised Skin Integrity  Goal: Skin integrity is maintained or improved  Description: INTERVENTIONS:  - Identify patients at risk for skin breakdown  - Assess and monitor skin integrity  - Assess and monitor nutrition and hydration status  - Monitor labs   - Assess for incontinence   - Turn and reposition patient  - Assist with mobility/ambulation  - Relieve pressure over bony prominences  - Avoid friction and shearing  - Provide appropriate hygiene as needed including keeping skin clean and dry  - Evaluate need for skin moisturizer/barrier cream  - Collaborate with interdisciplinary team   - Patient/family teaching  - Consider wound care consult   Outcome: Progressing     Problem: PAIN - ADULT  Goal: Verbalizes/displays adequate comfort level or baseline comfort level  Description: Interventions:  - Encourage patient to monitor pain and request assistance  - Assess pain using appropriate pain scale  - Administer analgesics based on type and severity of pain and evaluate response  - Implement non-pharmacological measures as appropriate and evaluate response  - Consider cultural and social influences on pain and pain management  - Notify physician/advanced practitioner if interventions unsuccessful or patient reports new pain  Outcome: Progressing     Problem: INFECTION - ADULT  Goal: Absence or prevention of progression during hospitalization  Description: INTERVENTIONS:  - Assess and monitor for signs and symptoms of infection  - Monitor lab/diagnostic results  - Monitor all insertion sites, i.e. indwelling lines, tubes, and drains  - Monitor endotracheal if appropriate and nasal secretions for changes in amount and color  - Goodrich appropriate cooling/warming therapies per order  - Administer medications as ordered  - Instruct and encourage patient and family to use good hand hygiene technique  - Identify and instruct in appropriate isolation precautions for  identified infection/condition  Outcome: Progressing  Goal: Absence of fever/infection during neutropenic period  Description: INTERVENTIONS:  - Monitor WBC    Outcome: Progressing     Problem: SAFETY ADULT  Goal: Patient will remain free of falls  Description: INTERVENTIONS:  - Educate patient/family on patient safety including physical limitations  - Instruct patient to call for assistance with activity   - Consult OT/PT to assist with strengthening/mobility   - Keep Call bell within reach  - Keep bed low and locked with side rails adjusted as appropriate  - Keep care items and personal belongings within reach  - Initiate and maintain comfort rounds  - Make Fall Risk Sign visible to staff  - Offer Toileting every 2 Hours, in advance of need  - Initiate/Maintain bed and chair alarm  - Obtain necessary fall risk management equipment: bed alarm   - Apply yellow socks and bracelet for high fall risk patients  - Consider moving patient to room near nurses station  Outcome: Progressing  Goal: Maintain or return to baseline ADL function  Description: INTERVENTIONS:  -  Assess patient's ability to carry out ADLs; assess patient's baseline for ADL function and identify physical deficits which impact ability to perform ADLs (bathing, care of mouth/teeth, toileting, grooming, dressing, etc.)  - Assess/evaluate cause of self-care deficits   - Assess range of motion  - Assess patient's mobility; develop plan if impaired  - Assess patient's need for assistive devices and provide as appropriate  - Encourage maximum independence but intervene and supervise when necessary  - Involve family in performance of ADLs  - Assess for home care needs following discharge   - Consider OT consult to assist with ADL evaluation and planning for discharge  - Provide patient education as appropriate  Outcome: Progressing  Goal: Maintains/Returns to pre admission functional level  Description: INTERVENTIONS:  - Perform AM-PAC 6 Click Basic  Mobility/ Daily Activity assessment daily.  - Set and communicate daily mobility goal to care team and patient/family/caregiver.   - Collaborate with rehabilitation services on mobility goals if consulted  - Perform Range of Motion 3 times a day.  - Reposition patient every 2 hours.  - Dangle patient 3 times a day  - Stand patient 3 times a day  - Ambulate patient 3 times a day  - Out of bed to chair 3 times a day   - Out of bed for meals 3 times a day  - Out of bed for toileting  - Record patient progress and toleration of activity level   Outcome: Progressing     Problem: DISCHARGE PLANNING  Goal: Discharge to home or other facility with appropriate resources  Description: INTERVENTIONS:  - Identify barriers to discharge w/patient and caregiver  - Arrange for needed discharge resources and transportation as appropriate  - Identify discharge learning needs (meds, wound care, etc.)  - Arrange for interpretive services to assist at discharge as needed  - Refer to Case Management Department for coordinating discharge planning if the patient needs post-hospital services based on physician/advanced practitioner order or complex needs related to functional status, cognitive ability, or social support system  Outcome: Progressing     Problem: Knowledge Deficit  Goal: Patient/family/caregiver demonstrates understanding of disease process, treatment plan, medications, and discharge instructions  Description: Complete learning assessment and assess knowledge base.  Interventions:  - Provide teaching at level of understanding  - Provide teaching via preferred learning methods  Outcome: Progressing     Problem: NEUROSENSORY - ADULT  Goal: Achieves stable or improved neurological status  Description: INTERVENTIONS  - Monitor and report changes in neurological status  - Monitor vital signs such as temperature, blood pressure, glucose, and any other labs ordered   - Initiate measures to prevent increased intracranial pressure  -  Monitor for seizure activity and implement precautions if appropriate      Outcome: Progressing  Goal: Remains free of injury related to seizures activity  Description: INTERVENTIONS  - Maintain airway, patient safety  and administer oxygen as ordered  - Monitor patient for seizure activity, document and report duration and description of seizure to physician/advanced practitioner  - If seizure occurs,  ensure patient safety during seizure  - Reorient patient post seizure  - Seizure pads on all 4 side rails  - Instruct patient/family to notify RN of any seizure activity including if an aura is experienced  - Instruct patient/family to call for assistance with activity based on nursing assessment  - Administer anti-seizure medications if ordered    Outcome: Progressing  Goal: Achieves maximal functionality and self care  Description: INTERVENTIONS  - Monitor swallowing and airway patency with patient fatigue and changes in neurological status  - Encourage and assist patient to increase activity and self care.   - Encourage visually impaired, hearing impaired and aphasic patients to use assistive/communication devices  Outcome: Progressing     Problem: CARDIOVASCULAR - ADULT  Goal: Maintains optimal cardiac output and hemodynamic stability  Description: INTERVENTIONS:  - Monitor I/O, vital signs and rhythm  - Monitor for S/S and trends of decreased cardiac output  - Administer and titrate ordered vasoactive medications to optimize hemodynamic stability  - Assess quality of pulses, skin color and temperature  - Assess for signs of decreased coronary artery perfusion  - Instruct patient to report change in severity of symptoms  Outcome: Progressing  Goal: Absence of cardiac dysrhythmias or at baseline rhythm  Description: INTERVENTIONS:  - Continuous cardiac monitoring, vital signs, obtain 12 lead EKG if ordered  - Administer antiarrhythmic and heart rate control medications as ordered  - Monitor electrolytes and  administer replacement therapy as ordered  Outcome: Progressing     Problem: RESPIRATORY - ADULT  Goal: Achieves optimal ventilation and oxygenation  Description: INTERVENTIONS:  - Assess for changes in respiratory status  - Assess for changes in mentation and behavior  - Position to facilitate oxygenation and minimize respiratory effort  - Oxygen administered by appropriate delivery if ordered  - Initiate smoking cessation education as indicated  - Encourage broncho-pulmonary hygiene including cough, deep breathe, Incentive Spirometry  - Assess the need for suctioning and aspirate as needed  - Assess and instruct to report SOB or any respiratory difficulty  - Respiratory Therapy support as indicated  Outcome: Progressing     Problem: GASTROINTESTINAL - ADULT  Goal: Minimal or absence of nausea and/or vomiting  Description: INTERVENTIONS:  - Administer IV fluids if ordered to ensure adequate hydration  - Maintain NPO status until nausea and vomiting are resolved  - Nasogastric tube if ordered  - Administer ordered antiemetic medications as needed  - Provide nonpharmacologic comfort measures as appropriate  - Advance diet as tolerated, if ordered  - Consider nutrition services referral to assist patient with adequate nutrition and appropriate food choices  Outcome: Progressing  Goal: Maintains or returns to baseline bowel function  Description: INTERVENTIONS:  - Assess bowel function  - Encourage oral fluids to ensure adequate hydration  - Administer IV fluids if ordered to ensure adequate hydration  - Administer ordered medications as needed  - Encourage mobilization and activity  - Consider nutritional services referral to assist patient with adequate nutrition and appropriate food choices  Outcome: Progressing  Goal: Maintains adequate nutritional intake  Description: INTERVENTIONS:  - Monitor percentage of each meal consumed  - Identify factors contributing to decreased intake, treat as appropriate  - Assist with  meals as needed  - Monitor I&O, weight, and lab values if indicated  - Obtain nutrition services referral as needed  Outcome: Progressing  Goal: Establish and maintain optimal ostomy function  Description: INTERVENTIONS:  - Assess bowel function  - Encourage oral fluids to ensure adequate hydration  - Administer IV fluids if ordered to ensure adequate hydration   - Administer ordered medications as needed  - Encourage mobilization and activity  - Nutrition services referral to assist patient with appropriate food choices  - Assess stoma site  - Consider wound care consult   Outcome: Progressing  Goal: Oral mucous membranes remain intact  Description: INTERVENTIONS  - Assess oral mucosa and hygiene practices  - Implement preventative oral hygiene regimen  - Implement oral medicated treatments as ordered  - Initiate Nutrition services referral as needed  Outcome: Progressing     Problem: GENITOURINARY - ADULT  Goal: Maintains or returns to baseline urinary function  Description: INTERVENTIONS:  - Assess urinary function  - Encourage oral fluids to ensure adequate hydration if ordered  - Administer IV fluids as ordered to ensure adequate hydration  - Administer ordered medications as needed  - Offer frequent toileting  - Follow urinary retention protocol if ordered  Outcome: Progressing  Goal: Absence of urinary retention  Description: INTERVENTIONS:  - Assess patient’s ability to void and empty bladder  - Monitor I/O  - Bladder scan as needed  - Discuss with physician/AP medications to alleviate retention as needed  - Discuss catheterization for long term situations as appropriate  Outcome: Progressing  Goal: Urinary catheter remains patent  Description: INTERVENTIONS:  - Assess patency of urinary catheter  - If patient has a chronic krishnan, consider changing catheter if non-functioning  - Follow guidelines for intermittent irrigation of non-functioning urinary catheter  Outcome: Progressing     Problem: METABOLIC,  FLUID AND ELECTROLYTES - ADULT  Goal: Electrolytes maintained within normal limits  Description: INTERVENTIONS:  - Monitor labs and assess patient for signs and symptoms of electrolyte imbalances  - Administer electrolyte replacement as ordered  - Monitor response to electrolyte replacements, including repeat lab results as appropriate  - Instruct patient on fluid and nutrition as appropriate  Outcome: Progressing  Goal: Fluid balance maintained  Description: INTERVENTIONS:  - Monitor labs   - Monitor I/O and WT  - Instruct patient on fluid and nutrition as appropriate  - Assess for signs & symptoms of volume excess or deficit  Outcome: Progressing  Goal: Glucose maintained within target range  Description: INTERVENTIONS:  - Monitor Blood Glucose as ordered  - Assess for signs and symptoms of hyperglycemia and hypoglycemia  - Administer ordered medications to maintain glucose within target range  - Assess nutritional intake and initiate nutrition service referral as needed  Outcome: Progressing     Problem: SKIN/TISSUE INTEGRITY - ADULT  Goal: Skin Integrity remains intact(Skin Breakdown Prevention)  Description: Assess:  -Perform Frank assessment every shift   -Clean and moisturize skin.   -Inspect skin when repositioning, toileting, and assisting with ADLS  -Assess under medical devices.  -Assess extremities for adequate circulation and sensation     Bed Management:  -Have minimal linens on bed & keep smooth, unwrinkled  -Change linens as needed when moist or perspiring  -Avoid sitting or lying in one position for more than 2 hours while in bed  -Keep HOB at 45 degrees     Toileting:  -Offer bedside commode  -Assess for incontinence every 2 hours   -Use incontinent care products after each incontinent episode.     Activity:  -Mobilize patient 3 times a day  -Encourage activity and walks on unit  -Encourage or provide ROM exercises   -Turn and reposition patient every 2  Hours  -Use appropriate equipment to lift  or move patient in bed  -Instruct/ Assist with weight shifting every 3 when out of bed in chair  -Consider limitation of chair time 2 hour intervals    Skin Care:  -Avoid use of baby powder, tape, friction and shearing, hot water or constrictive clothing  -Relieve pressure over bony prominences  -Do not massage red bony areas    Next Steps:  -Teach patient strategies to minimize risks.   -Consider consults to  interdisciplinary teams.  Outcome: Progressing  Goal: Incision(s), wounds(s) or drain site(s) healing without S/S of infection  Description: INTERVENTIONS  - Assess and document dressing, incision, wound bed, drain sites and surrounding tissue  - Provide patient and family education  - Perform skin care/dressing changes every shift  Outcome: Progressing  Goal: Pressure injury heals and does not worsen  Description: Interventions:  - Implement low air loss mattress or specialty surface (Criteria met)  - Apply silicone foam dressing  - Instruct/assist with weight shifting every 30 minutes when in chair   - Limit chair time to 2 hour intervals  - Use special pressure reducing interventions.  - Apply fecal or urinary incontinence containment device   - Perform passive or active ROM   - Turn and reposition patient & offload bony prominences every 2 hours   - Utilize friction reducing device or surface for transfers   - Consider consults to  interdisciplinary teams  - Use incontinent care products after each incontinent episodes   - Consider nutrition services referral as needed  Outcome: Progressing     Problem: HEMATOLOGIC - ADULT  Goal: Maintains hematologic stability  Description: INTERVENTIONS  - Assess for signs and symptoms of bleeding or hemorrhage  - Monitor labs  - Administer supportive blood products/factors as ordered and appropriate  Outcome: Progressing     Problem: MUSCULOSKELETAL - ADULT  Goal: Maintain or return mobility to safest level of function  Description: INTERVENTIONS:  - Assess patient's  ability to carry out ADLs; assess patient's baseline for ADL function and identify physical deficits which impact ability to perform ADLs (bathing, care of mouth/teeth, toileting, grooming, dressing, etc.)  - Assess/evaluate cause of self-care deficits   - Assess range of motion  - Assess patient's mobility  - Assess patient's need for assistive devices and provide as appropriate  - Encourage maximum independence but intervene and supervise when necessary  - Involve family in performance of ADLs  - Assess for home care needs following discharge   - Consider OT consult to assist with ADL evaluation and planning for discharge  - Provide patient education as appropriate  Outcome: Progressing  Goal: Maintain proper alignment of affected body part  Description: INTERVENTIONS:  - Support, maintain and protect limb and body alignment  - Provide patient/ family with appropriate education  Outcome: Progressing     Problem: Nutrition/Hydration-ADULT  Goal: Nutrient/Hydration intake appropriate for improving, restoring or maintaining nutritional needs  Description: Monitor and assess patient's nutrition/hydration status for malnutrition. Collaborate with interdisciplinary team and initiate plan and interventions as ordered.  Monitor patient's weight and dietary intake as ordered or per policy. Utilize nutrition screening tool and intervene as necessary. Determine patient's food preferences and provide high-protein, high-caloric foods as appropriate.     INTERVENTIONS:  - Monitor oral intake, urinary output, labs, and treatment plans  - Assess nutrition and hydration status and recommend course of action  - Evaluate amount of meals eaten  - Assist patient with eating if necessary   - Allow adequate time for meals  - Recommend/ encourage appropriate diets, oral nutritional supplements, and vitamin/mineral supplements  - Order, calculate, and assess calorie counts as needed  - Recommend, monitor, and adjust tube feedings and  TPN/PPN based on assessed needs  - Assess need for intravenous fluids  - Provide specific nutrition/hydration education as appropriate  - Include patient/family/caregiver in decisions related to nutrition  Outcome: Progressing

## 2024-01-09 NOTE — ASSESSMENT & PLAN NOTE
Secondary to chronic disease and ESRD  Baseline hemoglobin: 9.6 - 11.8  Initial hemoglobin: 12.0 (1/7/24)  Last hemoglobin: 8.9 (1/9/24)    Plan:   Transfuse as indicated in the setting of hemodynamic instability or signs of active bleeding  CBC in AM

## 2024-01-09 NOTE — ASSESSMENT & PLAN NOTE
Dialysis regimen: Tuesday, Thursday, Saturday via right arm AV fistula   1/8/24: Right IJ temporary HD catheter placement   1/8/24: CRRT initiated     Plan:   Continue CRRT with gentle volume removal given borderline BP   Q6h BMP per CRRT protocol   Cinacalcet 30mg daily   Nephrology consulted, appreciate recommendations

## 2024-01-09 NOTE — PROGRESS NOTES
Vancomycin IV Pharmacy-to-Dose Consultation    Berto Faria is a 53 y.o. male who is currently receiving Vancomycin IV with management by the Pharmacy Consult service.    Relevant clinical data and objective / subjective history reviewed.      Vancomycin Assessment:  Indication: Pneumonia (goal -600, trough >10)    Status: critically ill  Micro: NGTD  Procalcitonin: 3.16 on 1/8  Renal Function:     Lab Results   Component Value Date    CREATININE 4.44 (H) 01/09/2024     Estimated Creatinine Clearance: 13 mL/min (A) (by C-G formula based on SCr of 4.44 mg/dL (H)).  Dialysis: [facility]  Days of Therapy: 2  Current Dose: 750 mg IV q24h   Goal AUC / Trough: -600, trough >10   Last Level: none  Assessment: WBC slightly elevated, afebrile.       Vancomycin Plan:  New Dosing: continue current regimen while on [facility]   Predicted Trough / AUC: N/A  Next Level: on 1/10 at 1300  Renal Function Monitoring: Daily BMP and UOP      Pharmacy will continue to follow closely for s/sx of nephrotoxicity, infusion reactions and appropriateness of therapy.  BMP and CBC will be ordered per protocol. We will continue to follow the patient’s culture results and clinical progress daily.       Ian Novoa, PharmD, BCCCP  Critical Care Clinical Pharmacist  Available via [employer]

## 2024-01-09 NOTE — ASSESSMENT & PLAN NOTE
Secondary to chronic osteomyelitis     Plan:   PT/OT when appropriate   Offloading and frequent turns

## 2024-01-09 NOTE — PROGRESS NOTES
"Progress Note - Cardiology   Berto Faria 53 y.o. male MRN: [id number]  Unit/Bed#: [facility] [place] 518-01 Encounter: [id number]    Assessment/Plan:    NSTEMI Type 2 myocardial injury with newly reduced ejection fraction with regional wall motion abnormalities in LAD territory.  Patient has a history of coronary artery disease with stenting at Prior hx of MARC to circumflex and prox LAD in 2018 at OSH. Suspicious the patient has restenosis of his proximal LAD.   -Patient states that he just felt \"off\" for the past 2 weeks while he started physical therapy again.  He actually presented with acute hypoxic respiratory failure and sounds like decompensated heart failure.  He has not missed any of his dialysis sessions and I suspect his respiratory failure is most likely related to decompensated heart failure less likely infectious in etiology as he has not had a productive cough or any other infectious symptoms. He did have a mild leukocytosis and elevated Pro-Ramirez with some groundglass opacities on CT scan (could be just related to pulmonary edema) but is  being treated for a multifocal pneumonia.  -He did not have any chest pain but as stated above just felt off over the past couple weeks.  He had ST depressions concerning for ischemia and troponin elevation most likely in the setting of decompensated HF   -Aspirin 81 mg daily, Lipitor 80 mg daily  -Please start Toprol 25 XL   -Plan for left heart cath tomorrow  -NPO after midnight       Recommendations not final until attending attestation    Subjective/Objective     No events overnight and no complaints. Denies fevers/chills, nausea/vomiting, abdominal pain, diarrhea, cough, chest pain, shortness of breath, PND, orthopnea, presyncopal symptoms, syncopal episodes.    Getting dialysis this morning at bedside.       Vitals: /63 (BP Location: Left arm)   Pulse 84   Temp 97.6 °F (36.4 °C) (Oral)   Resp (!) 24   Ht 5' 4\" (1.626 m)   Wt 48.5 kg (106 lb 14.8 oz)   SpO2 " "90%   BMI 18.35 kg/m²   Vitals:    01/09/24 0556 01/09/24 0800   Weight: 48.5 kg (106 lb 14.8 oz) 48.5 kg (106 lb 14.8 oz)     Orthostatic Blood Pressures      Flowsheet Row Most Recent Value   Blood Pressure 124/63 filed at 01/09/2024 0900   Patient Position - Orthostatic VS Lying filed at 01/09/2024 0900              Intake/Output Summary (Last 24 hours) at 1/9/2024 1007  Last data filed at 1/9/2024 0925  Gross per 24 hour   Intake 597.98 ml   Output 1246 ml   Net -648.02 ml       Invasive Devices       Peripheral Intravenous Line  Duration             Peripheral IV 01/07/24 Distal;Left;Upper;Ventral (anterior) Arm 2 days    Peripheral IV 01/08/24 Left;Ventral (anterior) Forearm <1 day              Line  Duration             Hemodialysis AV Fistula Right Upper arm -- days              Hemodialysis Catheter  Duration             HD Permanent Double Catheter <1 day                    Review of Systems: Negative     Physical Exam: /61 (BP Location: Left arm)   Pulse 80   Temp (!) 97.4 °F (36.3 °C) (Oral)   Resp 21   Ht 5' 4\" (1.626 m)   Wt 48.5 kg (106 lb 14.8 oz)   SpO2 94%   BMI 18.35 kg/m²   General appearance: alert and oriented, in no acute distress  Heart: regular rate and rhythm, S1, S2 normal, no murmur, click, rub or gallop  Abdomen: soft, non-tender; bowel sounds normal; no masses,  no organomegaly  Extremities: extremities normal, warm and well-perfused; no cyanosis, clubbing, or edema and right AKA   Pulses: You2+ and symmetric  Neurologic: Grossly normal    Lab Results: I have personally reviewed pertinent lab results.    Imaging: I have personally reviewed pertinent reports.    EKG:   VTE Pharmacologic Prophylaxis: Heparin  VTE Mechanical Prophylaxis: sequential compression device    Counseling / Coordination of Care  Total Critical Care time spent 45 minutes excluding procedures, teaching and family updates.    "

## 2024-01-09 NOTE — PROGRESS NOTES
Pastoral Care Progress Note    2024  Patient: Berto Faria : 1970  Admission Date & Time: 2024 1326  MRN: [id number] CSN: [id number]      Fr Rivero met with the pt and provided prayers and blessings. The pt declined Fr's offers for anointing. No further needs were expressed at this time.    Chaplains still remain available.       24 1500   Clinical Encounter Type   Visited With Patient   Mandaen Encounters   Mandaen Needs Prayer   Sacramental Encounters   Sacrament of Sick-Anointing Patient declined anointing

## 2024-01-09 NOTE — ASSESSMENT & PLAN NOTE
Likely related to NSTEMI from septic shock and element of stress cardiomyopathy, but cannot rule about possible ischemic element given regional wall changes   1/7/24 TTE: LV cavity size is normal. Wall thickness is mildly increased. LVEF 40%. Systolic function is moderately reduced. Following segments are hypokinetic: mid anteroseptal, mid inferior, apical anterior, apical septal, apical inferior and apex.RV normal. Mild MR. Mild TR    Plan:   Continue volume removal with CRRT   Daily weights  Continue holding home afterload reduction agents/AV aubrie blockers given resolving septic shock:   Carvedilol 6.25mg BID  Isosorbide mononitrate 30mg q24h   Metoprolol succinate 50mg q24  Nifedipine 30mg q24 hours   Will need eventual ischemic workup   1000mL fluid restriction   Meds as above for NSTEMI  Cardiology consulted, appreciate recommendations

## 2024-01-09 NOTE — ASSESSMENT & PLAN NOTE
Presented to [facility] ED with shortness of breath. Troponin and EKG checked as part of initial workup. EKG without acute ischemic changes   NSTEMI likely related to increased demand from acute hypoxic respiratory failure and septic shock   Troponin 11150 > 19712 > 17471     Plan:  Heparin infusion (ACS/low)   Continue for at least 48 hours   Continue to trend troponin until peak  Anticipate drop associated with initiation of CRRT   ASAs 81mg daily   Atorvastatin 80mg qHS   Continue holding home afterload reduction agents/AV aubrie blockers given resolving septic shock:   Carvedilol 6.25mg BID  Isosorbide mononitrate 30mg q24h   Metoprolol succinate 50mg q24  Nifedipine 30mg q24 hours   Will need eventual ischemic workup   Cardiology consulted, appreciate recommendations

## 2024-01-10 LAB
ANION GAP SERPL CALCULATED.3IONS-SCNC: 9 MMOL/L
APTT PPP: 59 SECONDS (ref 23–37)
APTT PPP: 93 SECONDS (ref 23–37)
BACTERIA SPT RESP CULT: ABNORMAL
BUN SERPL-MCNC: 51 MG/DL (ref 5–25)
CA-I BLD-SCNC: 1.13 MMOL/L (ref 1.12–1.32)
CALCIUM SERPL-MCNC: 8.8 MG/DL (ref 8.4–10.2)
CHLORIDE SERPL-SCNC: 100 MMOL/L (ref 96–108)
CO2 SERPL-SCNC: 25 MMOL/L (ref 21–32)
CREAT SERPL-MCNC: 4.92 MG/DL (ref 0.6–1.3)
ERYTHROCYTE [DISTWIDTH] IN BLOOD BY AUTOMATED COUNT: 16.5 % (ref 11.6–15.1)
GFR SERPL CREATININE-BSD FRML MDRD: 12 ML/MIN/1.73SQ M
GLUCOSE SERPL-MCNC: 106 MG/DL (ref 65–140)
GLUCOSE SERPL-MCNC: 109 MG/DL (ref 65–140)
GLUCOSE SERPL-MCNC: 127 MG/DL (ref 65–140)
GLUCOSE SERPL-MCNC: 234 MG/DL (ref 65–140)
GLUCOSE SERPL-MCNC: 337 MG/DL (ref 65–140)
GLUCOSE SERPL-MCNC: 97 MG/DL (ref 65–140)
GLUCOSE SERPL-MCNC: 98 MG/DL (ref 65–140)
GRAM STN SPEC: ABNORMAL
HCT VFR BLD AUTO: 27.8 % (ref 36.5–49.3)
HGB BLD-MCNC: 8.9 G/DL (ref 12–17)
MAGNESIUM SERPL-MCNC: 2.1 MG/DL (ref 1.9–2.7)
MCH RBC QN AUTO: 32.8 PG (ref 26.8–34.3)
MCHC RBC AUTO-ENTMCNC: 32 G/DL (ref 31.4–37.4)
MCV RBC AUTO: 103 FL (ref 82–98)
MRSA NOSE QL CULT: NORMAL
PHOSPHATE SERPL-MCNC: 3.1 MG/DL (ref 2.7–4.5)
PLATELET # BLD AUTO: 197 THOUSANDS/UL (ref 149–390)
PMV BLD AUTO: 9 FL (ref 8.9–12.7)
POTASSIUM SERPL-SCNC: 4.5 MMOL/L (ref 3.5–5.3)
RBC # BLD AUTO: 2.71 MILLION/UL (ref 3.88–5.62)
SODIUM SERPL-SCNC: 134 MMOL/L (ref 135–147)
WBC # BLD AUTO: 11.39 THOUSAND/UL (ref 4.31–10.16)

## 2024-01-10 PROCEDURE — 83735 ASSAY OF MAGNESIUM: CPT | Performed by: INTERNAL MEDICINE

## 2024-01-10 PROCEDURE — 85027 COMPLETE CBC AUTOMATED: CPT | Performed by: INTERNAL MEDICINE

## 2024-01-10 PROCEDURE — 84100 ASSAY OF PHOSPHORUS: CPT | Performed by: INTERNAL MEDICINE

## 2024-01-10 PROCEDURE — 82330 ASSAY OF CALCIUM: CPT | Performed by: INTERNAL MEDICINE

## 2024-01-10 PROCEDURE — 80048 BASIC METABOLIC PNL TOTAL CA: CPT | Performed by: INTERNAL MEDICINE

## 2024-01-10 PROCEDURE — 87205 SMEAR GRAM STAIN: CPT | Performed by: PHYSICIAN ASSISTANT

## 2024-01-10 PROCEDURE — 82948 REAGENT STRIP/BLOOD GLUCOSE: CPT

## 2024-01-10 PROCEDURE — 5A1D90Z PERFORMANCE OF URINARY FILTRATION, CONTINUOUS, GREATER THAN 18 HOURS PER DAY: ICD-10-PCS | Performed by: INTERNAL MEDICINE

## 2024-01-10 PROCEDURE — 85730 THROMBOPLASTIN TIME PARTIAL: CPT | Performed by: INTERNAL MEDICINE

## 2024-01-10 PROCEDURE — NC001 PR NO CHARGE: Performed by: INTERNAL MEDICINE

## 2024-01-10 PROCEDURE — 99232 SBSQ HOSP IP/OBS MODERATE 35: CPT | Performed by: INTERNAL MEDICINE

## 2024-01-10 PROCEDURE — 99233 SBSQ HOSP IP/OBS HIGH 50: CPT | Performed by: INTERNAL MEDICINE

## 2024-01-10 RX ORDER — INSULIN LISPRO 100 [IU]/ML
1-6 INJECTION, SOLUTION INTRAVENOUS; SUBCUTANEOUS
Status: DISCONTINUED | OUTPATIENT
Start: 2024-01-11 | End: 2024-01-20 | Stop reason: HOSPADM

## 2024-01-10 RX ORDER — INSULIN GLARGINE 100 [IU]/ML
10 INJECTION, SOLUTION SUBCUTANEOUS EVERY 12 HOURS SCHEDULED
Status: DISCONTINUED | OUTPATIENT
Start: 2024-01-10 | End: 2024-01-10

## 2024-01-10 RX ORDER — INSULIN LISPRO 100 [IU]/ML
1-6 INJECTION, SOLUTION INTRAVENOUS; SUBCUTANEOUS EVERY 6 HOURS
Status: DISCONTINUED | OUTPATIENT
Start: 2024-01-10 | End: 2024-01-10

## 2024-01-10 RX ORDER — INSULIN GLARGINE 100 [IU]/ML
4 INJECTION, SOLUTION SUBCUTANEOUS ONCE
Status: COMPLETED | OUTPATIENT
Start: 2024-01-10 | End: 2024-01-10

## 2024-01-10 RX ORDER — HEPARIN SODIUM 5000 [USP'U]/ML
5000 INJECTION, SOLUTION INTRAVENOUS; SUBCUTANEOUS EVERY 8 HOURS SCHEDULED
Status: DISCONTINUED | OUTPATIENT
Start: 2024-01-10 | End: 2024-01-11

## 2024-01-10 RX ORDER — INSULIN LISPRO 100 [IU]/ML
1-6 INJECTION, SOLUTION INTRAVENOUS; SUBCUTANEOUS
Status: DISCONTINUED | OUTPATIENT
Start: 2024-01-11 | End: 2024-01-10

## 2024-01-10 RX ORDER — CARVEDILOL 3.12 MG/1
3.12 TABLET ORAL 2 TIMES DAILY WITH MEALS
Status: DISCONTINUED | OUTPATIENT
Start: 2024-01-10 | End: 2024-01-11

## 2024-01-10 RX ORDER — INSULIN GLARGINE 100 [IU]/ML
10 INJECTION, SOLUTION SUBCUTANEOUS EVERY 12 HOURS SCHEDULED
Status: DISCONTINUED | OUTPATIENT
Start: 2024-01-11 | End: 2024-01-11

## 2024-01-10 RX ORDER — INSULIN LISPRO 100 [IU]/ML
1-6 INJECTION, SOLUTION INTRAVENOUS; SUBCUTANEOUS
Status: DISCONTINUED | OUTPATIENT
Start: 2024-01-10 | End: 2024-01-10

## 2024-01-10 RX ORDER — MAGNESIUM SULFATE HEPTAHYDRATE 40 MG/ML
2 INJECTION, SOLUTION INTRAVENOUS ONCE
Status: COMPLETED | OUTPATIENT
Start: 2024-01-10 | End: 2024-01-10

## 2024-01-10 RX ORDER — INSULIN GLARGINE 100 [IU]/ML
5 INJECTION, SOLUTION SUBCUTANEOUS ONCE
Status: DISCONTINUED | OUTPATIENT
Start: 2024-01-10 | End: 2024-01-10

## 2024-01-10 RX ADMIN — TACROLIMUS 2 MG: 1 CAPSULE ORAL at 08:45

## 2024-01-10 RX ADMIN — TACROLIMUS 1 MG: 1 CAPSULE ORAL at 17:52

## 2024-01-10 RX ADMIN — PREDNISONE 5 MG: 5 TABLET ORAL at 08:36

## 2024-01-10 RX ADMIN — ATORVASTATIN CALCIUM 80 MG: 80 TABLET, FILM COATED ORAL at 16:27

## 2024-01-10 RX ADMIN — CEFEPIME 1000 MG: 1 INJECTION, POWDER, FOR SOLUTION INTRAMUSCULAR; INTRAVENOUS at 03:37

## 2024-01-10 RX ADMIN — CINACALCET 30 MG: 30 TABLET, FILM COATED ORAL at 08:36

## 2024-01-10 RX ADMIN — INSULIN LISPRO 5 UNITS: 100 INJECTION, SOLUTION INTRAVENOUS; SUBCUTANEOUS at 17:09

## 2024-01-10 RX ADMIN — HEPARIN SODIUM 5000 UNITS: 5000 INJECTION INTRAVENOUS; SUBCUTANEOUS at 21:34

## 2024-01-10 RX ADMIN — ASPIRIN 81 MG CHEWABLE TABLET 81 MG: 81 TABLET CHEWABLE at 08:36

## 2024-01-10 RX ADMIN — INSULIN LISPRO 8 UNITS: 100 INJECTION, SOLUTION INTRAVENOUS; SUBCUTANEOUS at 17:08

## 2024-01-10 RX ADMIN — HEPARIN SODIUM 18 UNITS/KG/HR: 10000 INJECTION, SOLUTION INTRAVENOUS at 08:35

## 2024-01-10 RX ADMIN — INSULIN GLARGINE 4 UNITS: 100 INJECTION, SOLUTION SUBCUTANEOUS at 22:05

## 2024-01-10 RX ADMIN — CARVEDILOL 3.12 MG: 3.12 TABLET, FILM COATED ORAL at 16:27

## 2024-01-10 RX ADMIN — MAGNESIUM SULFATE HEPTAHYDRATE 2 G: 40 INJECTION, SOLUTION INTRAVENOUS at 08:36

## 2024-01-10 RX ADMIN — INSULIN GLARGINE 10 UNITS: 100 INJECTION, SOLUTION SUBCUTANEOUS at 21:35

## 2024-01-10 NOTE — ASSESSMENT & PLAN NOTE
Lab Results   Component Value Date    HGBA1C 7.7 (H) 01/07/2024    HGBA1C 6.5 (H) 02/23/2018   Anticipate hyperglycemia associated with higher steroid dose    Plan:   Continue AC/HS sliding scale algorithm   Lispro 8 units TID with meals  Lantus 10 units q12h   Patient refused evening dose since he was NPO and nervous about hypoglycemia  Adjust insulin regimen as needed to maintain goal -180  Carb controlled diet   If acidosis is persistent, consider checking beta hydroxybutyrate and initiating insulin infusion if elevated

## 2024-01-10 NOTE — PROGRESS NOTES
[facility]  Progress Note  Name: Berto Faria I  MRN: [id number]  Unit/Bed#: PPHP 518-01 I Date of Admission: 2024   Date of Service: 1/10/2024 I Hospital Day: 2    Assessment/Plan   * Septic shock (HCC)  Assessment & Plan  Resolved  Patient presented to the ER 24 with fatigue and hypoxia, suspected source of bacterial pneumonia   Likely component of adrenal insufficiency given chronic, daily prednisone use   Not given 30ml/kg IVF resuscitation in the setting of anuric ESRD      Plan:  Levophed off since   Maintain MAP 60-65 given ESRD  Tolerated volume removal with CRRT - transition to iHD  Was transitioned back to home Prednisone 5 mg qD   Continue broad spectrum antibiotics pending culture results     Multifocal pneumonia  Assessment & Plan  Presented 23 to [facility] with new oxygen requirement, fatigue   Relevant imagin/7/24 CT Chest: Bilateral multilevel airspace consolidation, groundglass opacities, septal thickening and small bilateral pleural effusions secondary to volume overloaded as well as pneumonia.  Initial procalcitonin: 3.16 (24)  Possibly falsely elevated due to ESRD, but had lower levels in the past   Cultures:  24 BCXx2: No growth (24 hours)   23 COVID/Flu/RSV: Negative   23 RP2: Negative   24 MRSA Cx: PENDING    24 BCXx2: No growth (x 24 hrs)     Plan:   Current Antibiotics: Cefepime/vancomycin   Antibiotic day # 3  Discontinue Vancomycin if MRSA swab negative   Continue to monitor fever and WBC curve   Follow up cultures      Acute on chronic diastolic (congestive) heart failure (HCC)  Assessment & Plan  Likely related to NSTEMI from septic shock and element of stress cardiomyopathy, but cannot rule about possible ischemic element given regional wall changes   24 TTE: LV cavity size is normal. Wall thickness is mildly increased. LVEF 40%. Systolic function is moderately reduced. Following segments are  hypokinetic: mid anteroseptal, mid inferior, apical anterior, apical septal, apical inferior and apex.RV normal. Mild MR. Mild TR    Plan:   Volume removal with iHD  Daily weights  Continue holding home afterload reduction agents/AV aubrie blockers given resolving septic shock:   Carvedilol 6.25mg BID  Isosorbide mononitrate 30mg q24h   Metoprolol succinate 50mg q24  Nifedipine 30mg q24 hours   Cardiac cath for ischemic work-up today   1000mL fluid restriction   Meds as above for NSTEMI  Cardiology consulted, appreciate recommendations      NSTEMI (non-ST elevated myocardial infarction) (HCC)  Assessment & Plan  Presented to [facility] ED with shortness of breath. Troponin and EKG checked as part of initial workup. EKG without acute ischemic changes   NSTEMI likely related to increased demand from acute hypoxic respiratory failure and septic shock   Troponin 45998 > 47998 > 83529     Plan:  Heparin infusion (ACS/low)   Continue for at least 48 hours   ASAs 81mg daily   Atorvastatin 80mg qHS   Continue holding home afterload reduction agents/AV aubrie blockers given resolving septic shock:   Carvedilol 6.25mg BID  Isosorbide mononitrate 30mg q24h   Metoprolol succinate 50mg q24  Nifedipine 30mg q24 hours   To go for cardiac catheterization today   Cardiology consulted, appreciate recommendations     ESRD (end stage renal disease) ([facility])  Assessment & Plan  Dialysis regimen: Tuesday, Thursday, Saturday via right arm AV fistula   1/8/24: Right IJ temporary HD catheter placement   1/8/24: CRRT initiated   1/9/24: Tolerated -100 to -150 cc/hr volume removal     Plan:   CVVH filter clotted and went down overnight - transition to iHD now off pressors   Cinacalcet 30mg daily   Nephrology consulted, appreciate recommendations     Type 1 diabetes mellitus on insulin therapy ([facility])  Assessment & Plan  Lab Results   Component Value Date    HGBA1C 7.7 (H) 01/07/2024    HGBA1C 6.5 (H) 02/23/2018   Anticipate hyperglycemia associated with  higher steroid dose    Plan:   Continue AC/HS sliding scale algorithm   Lispro 8 units TID with meals  Lantus 10 units q12h   Patient refused evening dose since he was NPO and nervous about hypoglycemia  Adjust insulin regimen as needed to maintain goal -180  Carb controlled diet   If acidosis is persistent, consider checking beta hydroxybutyrate and initiating insulin infusion if elevated    Acute respiratory failure with hypoxia (HCC)  Assessment & Plan  Secondary to likely bacterial pneumonia and element of volume overload   1/7/24 CT Chest: Bilateral multilevel airspace consolidation, groundglass opacities, septal thickening and small bilateral pleural effusions secondary to volume overloaded as well as pneumonia.      Plan:   Wean supplemental oxygen as able to maintain SpO2 > 90%   Continue volume removal with CRRT  Antibiotic plan detailed above    Continue pulmonary hygiene. Incentive spirometer q1h while awake, encourage coughing and deep breathing. Upright positioning  Suction as needed and closely monitor secretions. Maintain HOB >30 degrees. Q4h oral care with chlorhexidine BID      Renal transplant failure and rejection  Assessment & Plan  History of renal transplant chronically on tacrolimus 1 mg p.o. nightly, 2 mg p.o. every morning, AZA 50 daily, Prednisone 5 Daily    Plan:  Continue tacrolimus 2mg qAM, 1mg qPM  Prednisone 5 mg qD  Holding home azathioprine 50mg daily   Nephrology consulted, appreciate recommendations   Reach out to transplant center for update regarding these medications    Chronic anemia  Assessment & Plan  Secondary to chronic disease and ESRD  Baseline hemoglobin: 9.6 - 11.8  Initial hemoglobin: 12.0 (1/7/24)  Last hemoglobin: 8.9 (1/9/24)    Plan:   Transfuse as indicated in the setting of hemodynamic instability or signs of active bleeding  CBC in AM  Anticipate Hgb drop with CRRT filter going down and blood unable to be returned    S/P AKA (above knee amputation), right  (HCC)  Assessment & Plan  Secondary to chronic osteomyelitis     Plan:   PT/OT when appropriate   Offloading and frequent turns              Disposition: Critical care    ICU Core Measures     A: Assess, Prevent, and Manage Pain Has pain been assessed? Yes  Need for changes to pain regimen? No   B: Both SAT/SAT  N/A   C: Choice of Sedation RASS Goal: N/A patient not on sedation  Need for changes to sedation or analgesia regimen? NA   D: Delirium CAM-ICU: Negative   E: Early Mobility  Plan for early mobility? Yes   F: Family Engagement Plan for family engagement today? Yes       Antibiotic Review: Awaiting culture results.     Review of Invasive Devices:      Central access plan: HD cath in place.  Plan discontinue if tolerating HD via AVF      Prophylaxis:  VTE VTE covered by:  heparin (porcine), Intravenous, 18 Units/kg/hr at 01/09/24 2414       Stress Ulcer  covered bypantoprazole (PROTONIX) 40 mg tablet [[id number]] (Long-Term Med), pantoprazole (PROTONIX) EC tablet 40 mg [[id number]]         Significant 24hr Events     24hr events: Remained off levophed. Tolerated CVVH -100 to -150 cc/hr throughout the day. CVVH filter clotted and went down last evening, blood unable to be returned. CVVH not re-started as has been off pressors and plan to transition to iHD today.   Refused evening dose of Lantus because of NPO status for cardiac cath and nervous about hypoglycemia, reports he becomes symptomatic around BG of 130.      Subjective   Review of Systems   Constitutional:  Negative for fatigue.   Respiratory:  Negative for cough and shortness of breath.    Cardiovascular:  Negative for chest pain.   Gastrointestinal:  Negative for abdominal pain.      Objective                            Vitals I/O      Most Recent Min/Max in 24hrs   Temp 97.6 °F (36.4 °C) Temp  Min: 97.4 °F (36.3 °C)  Max: 97.6 °F (36.4 °C)   Pulse 76 Pulse  Min: 69  Max: 86   Resp (!) 24 Resp  Min: 17  Max: 34   BP 97/52 BP  Min: 91/51  Max: 133/74    O2 Sat 97 % SpO2  Min: 88 %  Max: 98 %      Intake/Output Summary (Last 24 hours) at 1/10/2024 0058  Last data filed at 1/9/2024 2000  Gross per 24 hour   Intake 998.69 ml   Output 2424 ml   Net -1425.31 ml       Diet Renal; Renal Restrictive; Yes; Fluid Restriction 1000 ML; No; Consistent Carbohydrate Diet Level 2 (5 carb servings/75 grams CHO/meal)    Invasive Monitoring           Physical Exam   Physical Exam  Vitals reviewed.   Eyes:      Pupils: Pupils are equal, round, and reactive to light.   Skin:     General: Skin is warm and dry.   HENT:      Head: Normocephalic and atraumatic.   Neck:      Vascular: Central line present.      Comments: R IJ HD catheter in place  Cardiovascular:      Rate and Rhythm: Normal rate and regular rhythm.   Musculoskeletal:      Left lower leg: No edema.      Right Lower Extremity: Right leg is amputated above knee.   Abdominal:      Palpations: Abdomen is soft.      Tenderness: There is no abdominal tenderness.   Constitutional:       General: He is not in acute distress.     Appearance: He is well-developed.   Pulmonary:      Effort: Pulmonary effort is normal. No respiratory distress.      Breath sounds: Normal breath sounds.   Neurological:      General: No focal deficit present.      Mental Status: He is alert and oriented to person, place, and time.      GCS: GCS eye subscore is 4. GCS verbal subscore is 5. GCS motor subscore is 6.            Diagnostic Studies      Imaging: No new imaging      Medications:  Scheduled PRN   aspirin, 81 mg, QAM  atorvastatin, 80 mg, Daily With Dinner  calcitriol, 0.5 mcg, Once per day on Tuesday Thursday Saturday  cefepime, 1,000 mg, Q12H  cinacalcet, 30 mg, Daily  insulin glargine, 10 Units, Q12H JAMARCUS  insulin glargine, 5 Units, Once  insulin lispro, 1-6 Units, HS  insulin lispro, 1-6 Units, 4x Daily (with meals and at bedtime)  insulin lispro, 8 Units, TID With Meals  pantoprazole, 40 mg, Early Morning  predniSONE, 5 mg,  Daily  tacrolimus, 1 mg, QPM  tacrolimus, 2 mg, QAM  vancomycin, 15 mg/kg, Q24H      albuterol, 2 puff, Q4H PRN  ondansetron, 4 mg, Q8H PRN       Continuous    heparin (porcine), 3-20 Units/kg/hr (Order-Specific), Last Rate: 18 Units/kg/hr (01/09/24 1834)  NxStage K 4/Ca 3, 20,000 mL, Last Rate: 0 mL (01/09/24 1817)         Labs:    CBC    Recent Labs     01/08/24  0429 01/09/24  0334   WBC 13.80* 13.49*   HGB 10.3* 8.9*   HCT 32.2* 27.6*    187     BMP    Recent Labs     01/09/24  1005 01/09/24  1540   SODIUM 135 134*   K 4.2 4.9    100   CO2 24 21   AGAP 11 13   BUN 52* 43*   CREATININE 4.44* 3.73*   CALCIUM 9.0 9.3       Coags    Recent Labs     01/09/24  1754 01/10/24  0053   PTT 55* 59*        Additional Electrolytes  Recent Labs     01/09/24  1005 01/09/24  1540   MG 2.0 2.1   PHOS 3.0 2.9   CAIONIZED 1.16 1.18          Blood Gas    No recent results  Recent Labs     01/09/24  0332   PHVEN 7.332   OAO4YEK 41.0*   PO2VEN 33.4*   ZGS7PLI 21.2*   BEVEN -4.4   [id number] 56.8*    LFTs  No recent results    Infectious  Recent Labs     01/08/24  0429   PROCALCITONI 3.16*     Glucose  Recent Labs     01/08/24  2155 01/09/24  0332 01/09/24  1005 01/09/24  1540   GLUC 241* 260* 163* 178*           Carlie Chan PA-C

## 2024-01-10 NOTE — ASSESSMENT & PLAN NOTE
Dialysis regimen: Tuesday, Thursday, Saturday via right arm AV fistula   1/8/24: Right IJ temporary HD catheter placement   1/8/24: CRRT initiated   1/9/24: Tolerated -100 to -150 cc/hr volume removal     Plan:   CVVH filter clotted and went down overnight - transition to iHD now off pressors   Cinacalcet 30mg daily   Nephrology consulted, appreciate recommendations

## 2024-01-10 NOTE — ASSESSMENT & PLAN NOTE
Secondary to chronic disease and ESRD  Baseline hemoglobin: 9.6 - 11.8  Initial hemoglobin: 12.0 (1/7/24)  Last hemoglobin: 8.9 (1/9/24)    Plan:   Transfuse as indicated in the setting of hemodynamic instability or signs of active bleeding  CBC in AM  Anticipate Hgb drop with CRRT filter going down and blood unable to be returned

## 2024-01-10 NOTE — ASSESSMENT & PLAN NOTE
Likely related to NSTEMI from septic shock and element of stress cardiomyopathy, but cannot rule about possible ischemic element given regional wall changes   1/7/24 TTE: LV cavity size is normal. Wall thickness is mildly increased. LVEF 40%. Systolic function is moderately reduced. Following segments are hypokinetic: mid anteroseptal, mid inferior, apical anterior, apical septal, apical inferior and apex.RV normal. Mild MR. Mild TR    Plan:   Volume removal with iHD  Daily weights  Continue holding home afterload reduction agents/AV aubrie blockers given resolving septic shock:   Carvedilol 6.25mg BID  Isosorbide mononitrate 30mg q24h   Metoprolol succinate 50mg q24  Nifedipine 30mg q24 hours   Cardiac cath for ischemic work-up today   1000mL fluid restriction   Meds as above for NSTEMI  Cardiology consulted, appreciate recommendations

## 2024-01-10 NOTE — QUICK NOTE
Critical Care Interval Transfer Note:    Please refer to progress note from earlier today for full details.     Barriers to discharge:   Completion of abx course (Saturday)-consider completion of course with oral agent if no other hospitalization needs   iHD to start tomorrow (back to regular T, R, S schedule)  Cardiac cath on hold, plan for outpatient echo to re-evaluate EF and wall motion      Consults: IP CONSULT TO NUTRITION SERVICES  IP CONSULT TO NEPHROLOGY  IP CONSULT TO PHARMACY    Recommended to review admission imaging for incidental findings and document in discharge navigator: Chart reviewed, no known incidental findings noted at this time.      Discharge Plan: Anticipate discharge in 48-72 hrs to home.  Central access plan: HD cath in place.  Plan remove tomorrow if tolerates iHD     PT Recommendations: No rehabilitation needs  OT Recommendations: No rehabilitation needs    Patient seen and evaluated by Critical Care today and deemed to be appropriate for transfer to Med Surg with Telemetry. Spoke to Dr. Mckenna from [facility] to accept transfer. Critical care can be contacted via [employer] with any questions or concerns.

## 2024-01-10 NOTE — ASSESSMENT & PLAN NOTE
History of renal transplant chronically on tacrolimus 1 mg p.o. nightly, 2 mg p.o. every morning, AZA 50 daily, Prednisone 5 Daily    Plan:  Continue tacrolimus 2mg qAM, 1mg qPM  Prednisone 5 mg qD  Holding home azathioprine 50mg daily   Nephrology consulted, appreciate recommendations   Reach out to transplant center for update regarding these medications

## 2024-01-10 NOTE — PROGRESS NOTES
Interventional Cardiology Update Note    Briefly, the patient is a 53M w/ history of ESRD with failed renal transplant on immunosuppressive meds (tacrolimus), known CAD (ostial LAD and ostial Lcx stent), Right AKA 2/2 OM, H/O GIB (nonbleeding h.pylori ulcers causing hgb drop to 2.7 in 2019), HTN, HLD, DM2. Back in 2018, patient went in for elective ortho procedure OSH, had flash pulmonary edema, requiring intubation with signs of ischemia on ECG. Patient was discharged with the plans for outpatient ischemic workup. Patient was readmiited later with PEA arrest, trop elevation, and new cardiomyopahthy, and was found to have ostial LAD and ostial LCX lesion, underwent PCI of those 2 vessels. Patient reported to have residual long 50% lesion in midLAD.     On this admission, he came in with acute onset dyspnea and was admitted with hypoxic respiratory failure 2/2 multifocal PNA shown on CT. Patient was found to be in septic shock requiring vasopressor support. His troponin level stayed around 11,000 from the beginning. ECG shows NSR with inferolateral ST depression. Echo done on admission showed EF of 40% and mid anterior, anteroseptal and apical hypokinesis consistent with LAD territory ischemia. Patient has been referred to cath lab for LHC for ischemic workup.    Overall, his elevated troponin, ischemic ECG changes, and echo with RWMA, in the setting of septic shock without anginal symptom are suggestive of type II MI, triggered by hypotension in this patient with underlying CAD of the ostial LAD stent and residual midLAD disease. Given his acute illness, there is no urgency doing ischemic workup at this time. Also, he is at a high risk of periprocedural event, given his comobidities. We'll defer doing LHC during this admission. Our recommendation is to repeat the echo after discharge, and we'll consider invasive cath then, if RWMA persists. If his EF recovers, he can likely be managed conservatively.       Aditya  Oh, DO  Interventional Cardiology Fellow, PGY-7

## 2024-01-10 NOTE — ASSESSMENT & PLAN NOTE
Resolved  Patient presented to the ER 1/7/24 with fatigue and hypoxia, suspected source of bacterial pneumonia   Likely component of adrenal insufficiency given chronic, daily prednisone use   Not given 30ml/kg IVF resuscitation in the setting of anuric ESRD      Plan:  Levophed off since 1/8  Maintain MAP 60-65 given ESRD  Tolerated volume removal with CRRT - transition to iHD  Was transitioned back to home Prednisone 5 mg qD   Continue broad spectrum antibiotics pending culture results

## 2024-01-10 NOTE — ASSESSMENT & PLAN NOTE
Presented to [facility] ED with shortness of breath. Troponin and EKG checked as part of initial workup. EKG without acute ischemic changes   NSTEMI likely related to increased demand from acute hypoxic respiratory failure and septic shock   Troponin 50952 > 87462 > 42475     Plan:  Heparin infusion (ACS/low)   Continue for at least 48 hours   ASAs 81mg daily   Atorvastatin 80mg qHS   Continue holding home afterload reduction agents/AV aubrie blockers given resolving septic shock:   Carvedilol 6.25mg BID  Isosorbide mononitrate 30mg q24h   Metoprolol succinate 50mg q24  Nifedipine 30mg q24 hours   To go for cardiac catheterization today   Cardiology consulted, appreciate recommendations

## 2024-01-10 NOTE — ASSESSMENT & PLAN NOTE
Presented 23 to [facility] with new oxygen requirement, fatigue   Relevant imagin/7/24 CT Chest: Bilateral multilevel airspace consolidation, groundglass opacities, septal thickening and small bilateral pleural effusions secondary to volume overloaded as well as pneumonia.  Initial procalcitonin: 3.16 (24)  Possibly falsely elevated due to ESRD, but had lower levels in the past   Cultures:  24 BCXx2: No growth (24 hours)   23 COVID/Flu/RSV: Negative   23 RP2: Negative   24 MRSA Cx: PENDING    24 BCXx2: No growth (x 24 hrs)     Plan:   Current Antibiotics: Cefepime/vancomycin   Antibiotic day # 3  Discontinue Vancomycin if MRSA swab negative   Continue to monitor fever and WBC curve   Follow up cultures

## 2024-01-10 NOTE — QUICK NOTE
Patient off CRRT.  For left heart cath today.  Will plan for hemodialysis tomorrow and see in formal follow-up tomorrow.  Please call or text with questions.  Have discussed with Madie Chan in person.

## 2024-01-10 NOTE — CONSULTS
Vancomycin IV Pharmacy-to-Dose Consultation    Berto Faria is a 53 y.o. male who was receiving Vancomycin IV with management by the Pharmacy Consult service for treatment of Pneumonia (goal -600, trough >10)  .       The patient’s Vancomycin therapy has been discontinued. Thank you for allowing us to take part in this patient's care. Pharmacy will sign-off now; please call or re-consult if there are any questions.        Ian Novoa, PharmD, BCCCP  Critical Care Clinical Pharmacist  Available via [employer]

## 2024-01-10 NOTE — PROGRESS NOTES
"Progress Note - Cardiology   Berto Faria 53 y.o. male MRN: [id number]  Unit/Bed#: [facility] 518-01 Encounter: [id number]    Assessment/Plan:    NSTEMI Type 2 myocardial injury most likely in the setting of acute illness PNA without overt symptoms although patient is on immunosuppression on tacro for failed kidney transplant. He presented to the hospital with hypoxic respiratory failure and found to have elevated tropoin, ischemic ecgs, and a newly reduced ejection fraction with regional wall motion abnormalities in LAD territory.  He has a history of coronary artery disease with stenting at to circumflex and prox LAD in 2018 at OSH. Suspicious the patient has restenosis of his proximal LAD.   -Patient states that he just felt \"off\" for the past 2 weeks while he started physical therapy again.  He actually presented with acute hypoxic respiratory failure and sounds like decompensated heart failure.  He has not missed any of his dialysis sessions and I suspect his respiratory failure is most likely related to decompensated heart failure less likely infectious in etiology as he has not had a productive cough or any other infectious symptoms. He did have a mild leukocytosis and elevated Pro-Ramirez with some groundglass opacities on CT scan (could be just related to pulmonary edema) but is  being treated for a multifocal pneumonia.  -He did not have any chest pain but as stated above just felt off over the past couple weeks.  He had ST depressions concerning for ischemia and troponin elevation most likely in the setting of decompensated HF   -Aspirin 81 mg daily, Lipitor 80 mg daily  -Please start Toprol 25 XL   -Plan for left heart cath     RECOMMENDATIONS TODAY:  -plan for LHC    Subjective/Objective       No events overnight and no complaints. Able to lay flat comfortably in bed on room air.        Vitals: /58   Pulse 76   Temp 97.6 °F (36.4 °C) (Oral)   Resp (!) 24   Ht 5' 4\" (1.626 m)   Wt 48.5 kg (106 lb " 14.8 oz)   SpO2 97%   BMI 18.35 kg/m²   Vitals:    01/09/24 0556 01/09/24 0800   Weight: 48.5 kg (106 lb 14.8 oz) 48.5 kg (106 lb 14.8 oz)     Orthostatic Blood Pressures      Flowsheet Row Most Recent Value   Blood Pressure 105/58 filed at 01/10/2024 0230   Patient Position - Orthostatic VS Sitting filed at 01/09/2024 2000              Intake/Output Summary (Last 24 hours) at 1/10/2024 0554  Last data filed at 1/10/2024 0101  Gross per 24 hour   Intake 813.33 ml   Output 1810 ml   Net -996.67 ml       Invasive Devices       Peripheral Intravenous Line  Duration             Peripheral IV 01/07/24 Distal;Left;Upper;Ventral (anterior) Arm 2 days    Peripheral IV 01/08/24 Left;Ventral (anterior) Forearm 1 day              Line  Duration             Hemodialysis AV Fistula Right Upper arm -- days              Hemodialysis Catheter  Duration             HD Permanent Double Catheter 1 day                    Review of Systems: Negative     Physical Exam:    Right IJ mahurkar cath, Right arm fistula     General appearance: alert and oriented, in no acute distress  Heart: regular rate and rhythm, S1, S2 normal, no murmur, click, rub or gallop  Abdomen: soft, non-tender; bowel sounds normal; no masses,  no organomegaly  Extremities: extremities normal, warm and well-perfused; no cyanosis, clubbing, or edema and right AKA   Neurologic: Grossly normal    Lab Results: I have personally reviewed pertinent lab results.    Imaging: I have personally reviewed pertinent reports.    EKG:   VTE Pharmacologic Prophylaxis: Heparin  VTE Mechanical Prophylaxis: sequential compression device    Counseling / Coordination of Care  Total Critical Care time spent 45 minutes excluding procedures, teaching and family updates.

## 2024-01-11 ENCOUNTER — APPOINTMENT (INPATIENT)
Dept: DIALYSIS | Facility: HOSPITAL | Age: 54
DRG: 853 | End: 2024-01-11
Payer: MEDICARE

## 2024-01-11 ENCOUNTER — APPOINTMENT (INPATIENT)
Dept: NON INVASIVE DIAGNOSTICS | Facility: HOSPITAL | Age: 54
DRG: 853 | End: 2024-01-11
Payer: MEDICARE

## 2024-01-11 PROBLEM — I48.91 NEW ONSET A-FIB (HCC): Status: ACTIVE | Noted: 2024-01-11

## 2024-01-11 PROBLEM — I25.5 ISCHEMIC CARDIOMYOPATHY: Status: ACTIVE | Noted: 2024-01-11

## 2024-01-11 PROBLEM — R57.0 CARDIOGENIC SHOCK (HCC): Status: ACTIVE | Noted: 2024-01-11

## 2024-01-11 LAB
2HR DELTA HS TROPONIN: -1059 NG/L
4HR DELTA HS TROPONIN: -1515 NG/L
ANION GAP SERPL CALCULATED.3IONS-SCNC: 13 MMOL/L
ANION GAP SERPL CALCULATED.3IONS-SCNC: 15 MMOL/L
ANION GAP SERPL CALCULATED.3IONS-SCNC: 19 MMOL/L
APICAL FOUR CHAMBER EJECTION FRACTION: 28 %
APTT PPP: 45 SECONDS (ref 23–37)
APTT PPP: 89 SECONDS (ref 23–37)
BASE EX.OXY STD BLDV CALC-SCNC: 37 % (ref 60–80)
BASE EX.OXY STD BLDV CALC-SCNC: 43.8 % (ref 60–80)
BASE EXCESS BLDV CALC-SCNC: -5.4 MMOL/L
BASE EXCESS BLDV CALC-SCNC: -6.5 MMOL/L
BSA FOR ECHO PROCEDURE: 1.52 M2
BUN SERPL-MCNC: 71 MG/DL (ref 5–25)
BUN SERPL-MCNC: 81 MG/DL (ref 5–25)
BUN SERPL-MCNC: 92 MG/DL (ref 5–25)
CA-I BLD-SCNC: 1.08 MMOL/L (ref 1.12–1.32)
CA-I BLD-SCNC: 1.11 MMOL/L (ref 1.12–1.32)
CALCIUM SERPL-MCNC: 8.1 MG/DL (ref 8.4–10.2)
CALCIUM SERPL-MCNC: 8.6 MG/DL (ref 8.4–10.2)
CALCIUM SERPL-MCNC: 8.7 MG/DL (ref 8.4–10.2)
CARDIAC TROPONIN I PNL SERPL HS: 9884 NG/L
CARDIAC TROPONIN I PNL SERPL HS: ABNORMAL NG/L
CARDIAC TROPONIN I PNL SERPL HS: ABNORMAL NG/L
CHLORIDE SERPL-SCNC: 100 MMOL/L (ref 96–108)
CHLORIDE SERPL-SCNC: 96 MMOL/L (ref 96–108)
CHLORIDE SERPL-SCNC: 97 MMOL/L (ref 96–108)
CO2 SERPL-SCNC: 18 MMOL/L (ref 21–32)
CO2 SERPL-SCNC: 19 MMOL/L (ref 21–32)
CO2 SERPL-SCNC: 20 MMOL/L (ref 21–32)
CREAT SERPL-MCNC: 5.88 MG/DL (ref 0.6–1.3)
CREAT SERPL-MCNC: 7.18 MG/DL (ref 0.6–1.3)
CREAT SERPL-MCNC: 7.72 MG/DL (ref 0.6–1.3)
ERYTHROCYTE [DISTWIDTH] IN BLOOD BY AUTOMATED COUNT: 16.1 % (ref 11.6–15.1)
ERYTHROCYTE [DISTWIDTH] IN BLOOD BY AUTOMATED COUNT: 16.4 % (ref 11.6–15.1)
FRACTIONAL SHORTENING: 16 (ref 28–44)
GFR SERPL CREATININE-BSD FRML MDRD: 7 ML/MIN/1.73SQ M
GFR SERPL CREATININE-BSD FRML MDRD: 7 ML/MIN/1.73SQ M
GFR SERPL CREATININE-BSD FRML MDRD: 9 ML/MIN/1.73SQ M
GLUCOSE SERPL-MCNC: 138 MG/DL (ref 65–140)
GLUCOSE SERPL-MCNC: 165 MG/DL (ref 65–140)
GLUCOSE SERPL-MCNC: 171 MG/DL (ref 65–140)
GLUCOSE SERPL-MCNC: 176 MG/DL (ref 65–140)
GLUCOSE SERPL-MCNC: 189 MG/DL (ref 65–140)
GLUCOSE SERPL-MCNC: 242 MG/DL (ref 65–140)
GLUCOSE SERPL-MCNC: 314 MG/DL (ref 65–140)
GLUCOSE SERPL-MCNC: 319 MG/DL (ref 65–140)
GLUCOSE SERPL-MCNC: 319 MG/DL (ref 65–140)
GLUCOSE SERPL-MCNC: 340 MG/DL (ref 65–140)
HCO3 BLDV-SCNC: 18.8 MMOL/L (ref 24–30)
HCO3 BLDV-SCNC: 19.6 MMOL/L (ref 24–30)
HCT VFR BLD AUTO: 28.3 % (ref 36.5–49.3)
HCT VFR BLD AUTO: 30.3 % (ref 36.5–49.3)
HGB BLD-MCNC: 9.3 G/DL (ref 12–17)
HGB BLD-MCNC: 9.8 G/DL (ref 12–17)
INR PPP: 1.17 (ref 0.84–1.19)
INTERVENTRICULAR SEPTUM IN DIASTOLE (PARASTERNAL SHORT AXIS VIEW): 0.9 CM
INTERVENTRICULAR SEPTUM: 0.9 CM (ref 0.6–1.1)
LACTATE SERPL-SCNC: 1.3 MMOL/L (ref 0.5–2)
LEFT INTERNAL DIMENSION IN SYSTOLE: 4.2 CM (ref 2.1–4)
LEFT VENTRICULAR INTERNAL DIMENSION IN DIASTOLE: 5 CM (ref 3.5–6)
LEFT VENTRICULAR POSTERIOR WALL IN END DIASTOLE: 1.1 CM
LEFT VENTRICULAR STROKE VOLUME: 42 ML
LVSV (TEICH): 42 ML
MAGNESIUM SERPL-MCNC: 2.7 MG/DL (ref 1.9–2.7)
MAGNESIUM SERPL-MCNC: 3.2 MG/DL (ref 1.9–2.7)
MAGNESIUM SERPL-MCNC: 3.2 MG/DL (ref 1.9–2.7)
MCH RBC QN AUTO: 33.1 PG (ref 26.8–34.3)
MCH RBC QN AUTO: 33.7 PG (ref 26.8–34.3)
MCHC RBC AUTO-ENTMCNC: 32.3 G/DL (ref 31.4–37.4)
MCHC RBC AUTO-ENTMCNC: 32.9 G/DL (ref 31.4–37.4)
MCV RBC AUTO: 101 FL (ref 82–98)
MCV RBC AUTO: 104 FL (ref 82–98)
O2 CT BLDV-SCNC: 4.9 ML/DL
O2 CT BLDV-SCNC: 6 ML/DL
PCO2 BLDV: 36.1 MM HG (ref 42–50)
PCO2 BLDV: 36.5 MM HG (ref 42–50)
PH BLDV: 7.33 [PH] (ref 7.3–7.4)
PH BLDV: 7.35 [PH] (ref 7.3–7.4)
PHOSPHATE SERPL-MCNC: 2.5 MG/DL (ref 2.7–4.5)
PHOSPHATE SERPL-MCNC: 2.7 MG/DL (ref 2.7–4.5)
PHOSPHATE SERPL-MCNC: 3.4 MG/DL (ref 2.7–4.5)
PLATELET # BLD AUTO: 194 THOUSANDS/UL (ref 149–390)
PLATELET # BLD AUTO: 237 THOUSANDS/UL (ref 149–390)
PMV BLD AUTO: 9.2 FL (ref 8.9–12.7)
PMV BLD AUTO: 9.3 FL (ref 8.9–12.7)
PO2 BLDV: 23.1 MM HG (ref 35–45)
PO2 BLDV: 27.9 MM HG (ref 35–45)
POTASSIUM SERPL-SCNC: 4.8 MMOL/L (ref 3.5–5.3)
POTASSIUM SERPL-SCNC: 5 MMOL/L (ref 3.5–5.3)
POTASSIUM SERPL-SCNC: 5.2 MMOL/L (ref 3.5–5.3)
PROTHROMBIN TIME: 14.8 SECONDS (ref 11.6–14.5)
RA PRESSURE ESTIMATED: 15 MMHG
RBC # BLD AUTO: 2.81 MILLION/UL (ref 3.88–5.62)
RBC # BLD AUTO: 2.91 MILLION/UL (ref 3.88–5.62)
RV PSP: 57 MMHG
SL CV LV EF: 32
SL CV PED ECHO LEFT VENTRICLE DIASTOLIC VOLUME (MOD BIPLANE) 2D: 120 ML
SL CV PED ECHO LEFT VENTRICLE SYSTOLIC VOLUME (MOD BIPLANE) 2D: 78 ML
SODIUM SERPL-SCNC: 131 MMOL/L (ref 135–147)
SODIUM SERPL-SCNC: 133 MMOL/L (ref 135–147)
SODIUM SERPL-SCNC: 133 MMOL/L (ref 135–147)
TR MAX PG: 42 MMHG
TR PEAK VELOCITY: 3.3 M/S
WBC # BLD AUTO: 10.99 THOUSAND/UL (ref 4.31–10.16)
WBC # BLD AUTO: 12.97 THOUSAND/UL (ref 4.31–10.16)

## 2024-01-11 PROCEDURE — 99233 SBSQ HOSP IP/OBS HIGH 50: CPT | Performed by: INTERNAL MEDICINE

## 2024-01-11 PROCEDURE — 93308 TTE F-UP OR LMTD: CPT | Performed by: INTERNAL MEDICINE

## 2024-01-11 PROCEDURE — 93005 ELECTROCARDIOGRAM TRACING: CPT

## 2024-01-11 PROCEDURE — 82948 REAGENT STRIP/BLOOD GLUCOSE: CPT

## 2024-01-11 PROCEDURE — 80048 BASIC METABOLIC PNL TOTAL CA: CPT | Performed by: NURSE PRACTITIONER

## 2024-01-11 PROCEDURE — NC001 PR NO CHARGE: Performed by: INTERNAL MEDICINE

## 2024-01-11 PROCEDURE — 85610 PROTHROMBIN TIME: CPT

## 2024-01-11 PROCEDURE — 85730 THROMBOPLASTIN TIME PARTIAL: CPT

## 2024-01-11 PROCEDURE — 90945 DIALYSIS ONE EVALUATION: CPT

## 2024-01-11 PROCEDURE — 84100 ASSAY OF PHOSPHORUS: CPT | Performed by: NURSE PRACTITIONER

## 2024-01-11 PROCEDURE — 93325 DOPPLER ECHO COLOR FLOW MAPG: CPT | Performed by: INTERNAL MEDICINE

## 2024-01-11 PROCEDURE — 82330 ASSAY OF CALCIUM: CPT | Performed by: INTERNAL MEDICINE

## 2024-01-11 PROCEDURE — 82805 BLOOD GASES W/O2 SATURATION: CPT

## 2024-01-11 PROCEDURE — 4A133J1 MONITORING OF ARTERIAL PULSE, PERIPHERAL, PERCUTANEOUS APPROACH: ICD-10-PCS | Performed by: INTERNAL MEDICINE

## 2024-01-11 PROCEDURE — 93325 DOPPLER ECHO COLOR FLOW MAPG: CPT

## 2024-01-11 PROCEDURE — 36620 INSERTION CATHETER ARTERY: CPT | Performed by: INTERNAL MEDICINE

## 2024-01-11 PROCEDURE — 84100 ASSAY OF PHOSPHORUS: CPT | Performed by: INTERNAL MEDICINE

## 2024-01-11 PROCEDURE — 94760 N-INVAS EAR/PLS OXIMETRY 1: CPT

## 2024-01-11 PROCEDURE — 83735 ASSAY OF MAGNESIUM: CPT | Performed by: INTERNAL MEDICINE

## 2024-01-11 PROCEDURE — NC001 PR NO CHARGE

## 2024-01-11 PROCEDURE — 03HY32Z INSERTION OF MONITORING DEVICE INTO UPPER ARTERY, PERCUTANEOUS APPROACH: ICD-10-PCS | Performed by: INTERNAL MEDICINE

## 2024-01-11 PROCEDURE — 85730 THROMBOPLASTIN TIME PARTIAL: CPT | Performed by: INTERNAL MEDICINE

## 2024-01-11 PROCEDURE — 82805 BLOOD GASES W/O2 SATURATION: CPT | Performed by: NURSE PRACTITIONER

## 2024-01-11 PROCEDURE — 93321 DOPPLER ECHO F-UP/LMTD STD: CPT

## 2024-01-11 PROCEDURE — 5A1D90Z PERFORMANCE OF URINARY FILTRATION, CONTINUOUS, GREATER THAN 18 HOURS PER DAY: ICD-10-PCS | Performed by: INTERNAL MEDICINE

## 2024-01-11 PROCEDURE — 83605 ASSAY OF LACTIC ACID: CPT | Performed by: NURSE PRACTITIONER

## 2024-01-11 PROCEDURE — 99232 SBSQ HOSP IP/OBS MODERATE 35: CPT | Performed by: INTERNAL MEDICINE

## 2024-01-11 PROCEDURE — 85027 COMPLETE CBC AUTOMATED: CPT | Performed by: NURSE PRACTITIONER

## 2024-01-11 PROCEDURE — 4A133B1 MONITORING OF ARTERIAL PRESSURE, PERIPHERAL, PERCUTANEOUS APPROACH: ICD-10-PCS | Performed by: INTERNAL MEDICINE

## 2024-01-11 PROCEDURE — 83735 ASSAY OF MAGNESIUM: CPT | Performed by: NURSE PRACTITIONER

## 2024-01-11 PROCEDURE — 93308 TTE F-UP OR LMTD: CPT

## 2024-01-11 PROCEDURE — 85027 COMPLETE CBC AUTOMATED: CPT

## 2024-01-11 PROCEDURE — 80048 BASIC METABOLIC PNL TOTAL CA: CPT | Performed by: INTERNAL MEDICINE

## 2024-01-11 PROCEDURE — 99291 CRITICAL CARE FIRST HOUR: CPT | Performed by: INTERNAL MEDICINE

## 2024-01-11 PROCEDURE — 93321 DOPPLER ECHO F-UP/LMTD STD: CPT | Performed by: INTERNAL MEDICINE

## 2024-01-11 PROCEDURE — 84484 ASSAY OF TROPONIN QUANT: CPT

## 2024-01-11 RX ORDER — METOPROLOL TARTRATE 1 MG/ML
5 INJECTION, SOLUTION INTRAVENOUS EVERY 6 HOURS PRN
Status: DISCONTINUED | OUTPATIENT
Start: 2024-01-11 | End: 2024-01-11

## 2024-01-11 RX ORDER — NOREPINEPHRINE BITARTRATE 1 MG/ML
INJECTION, SOLUTION INTRAVENOUS
Status: COMPLETED
Start: 2024-01-11 | End: 2024-01-11

## 2024-01-11 RX ORDER — CALCIUM GLUCONATE 20 MG/ML
2 INJECTION, SOLUTION INTRAVENOUS ONCE
Status: COMPLETED | OUTPATIENT
Start: 2024-01-11 | End: 2024-01-11

## 2024-01-11 RX ORDER — INSULIN LISPRO 100 [IU]/ML
10 INJECTION, SOLUTION INTRAVENOUS; SUBCUTANEOUS
Status: DISCONTINUED | OUTPATIENT
Start: 2024-01-11 | End: 2024-01-11

## 2024-01-11 RX ORDER — INSULIN GLARGINE 100 [IU]/ML
15 INJECTION, SOLUTION SUBCUTANEOUS EVERY 12 HOURS SCHEDULED
Status: DISCONTINUED | OUTPATIENT
Start: 2024-01-11 | End: 2024-01-11

## 2024-01-11 RX ORDER — ALBUMIN (HUMAN) 12.5 G/50ML
12.5 SOLUTION INTRAVENOUS ONCE
Status: COMPLETED | OUTPATIENT
Start: 2024-01-11 | End: 2024-01-11

## 2024-01-11 RX ORDER — HEPARIN SODIUM 1000 [USP'U]/ML
3000 INJECTION, SOLUTION INTRAVENOUS; SUBCUTANEOUS EVERY 6 HOURS PRN
Status: DISCONTINUED | OUTPATIENT
Start: 2024-01-11 | End: 2024-01-14

## 2024-01-11 RX ORDER — INSULIN LISPRO 100 [IU]/ML
2 INJECTION, SOLUTION INTRAVENOUS; SUBCUTANEOUS ONCE
Status: COMPLETED | OUTPATIENT
Start: 2024-01-11 | End: 2024-01-11

## 2024-01-11 RX ORDER — INSULIN GLARGINE 100 [IU]/ML
10 INJECTION, SOLUTION SUBCUTANEOUS EVERY 12 HOURS SCHEDULED
Status: DISCONTINUED | OUTPATIENT
Start: 2024-01-11 | End: 2024-01-16

## 2024-01-11 RX ORDER — INSULIN LISPRO 100 [IU]/ML
8 INJECTION, SOLUTION INTRAVENOUS; SUBCUTANEOUS
Status: DISCONTINUED | OUTPATIENT
Start: 2024-01-11 | End: 2024-01-16

## 2024-01-11 RX ORDER — HEPARIN SODIUM 1000 [USP'U]/ML
1500 INJECTION, SOLUTION INTRAVENOUS; SUBCUTANEOUS EVERY 6 HOURS PRN
Status: DISCONTINUED | OUTPATIENT
Start: 2024-01-11 | End: 2024-01-14

## 2024-01-11 RX ORDER — HEPARIN SODIUM 10000 [USP'U]/100ML
3-20 INJECTION, SOLUTION INTRAVENOUS
Status: DISCONTINUED | OUTPATIENT
Start: 2024-01-11 | End: 2024-01-14

## 2024-01-11 RX ORDER — MILRINONE LACTATE 0.2 MG/ML
0.13 INJECTION, SOLUTION INTRAVENOUS CONTINUOUS
Status: DISCONTINUED | OUTPATIENT
Start: 2024-01-11 | End: 2024-01-14

## 2024-01-11 RX ORDER — MIDODRINE HYDROCHLORIDE 5 MG/1
5 TABLET ORAL ONCE
Status: COMPLETED | OUTPATIENT
Start: 2024-01-11 | End: 2024-01-11

## 2024-01-11 RX ORDER — HEPARIN SODIUM 1000 [USP'U]/ML
3000 INJECTION, SOLUTION INTRAVENOUS; SUBCUTANEOUS ONCE
Status: COMPLETED | OUTPATIENT
Start: 2024-01-11 | End: 2024-01-11

## 2024-01-11 RX ADMIN — Medication 20000 ML: at 15:27

## 2024-01-11 RX ADMIN — INSULIN LISPRO 3 UNITS: 100 INJECTION, SOLUTION INTRAVENOUS; SUBCUTANEOUS at 11:16

## 2024-01-11 RX ADMIN — SODIUM PHOSPHATE, MONOBASIC, MONOHYDRATE AND SODIUM PHOSPHATE, DIBASIC, ANHYDROUS 6 MMOL: 142; 276 INJECTION, SOLUTION INTRAVENOUS at 21:58

## 2024-01-11 RX ADMIN — NOREPINEPHRINE BITARTRATE 1 MCG/MIN: 1 INJECTION, SOLUTION, CONCENTRATE INTRAVENOUS at 19:42

## 2024-01-11 RX ADMIN — INSULIN GLARGINE 10 UNITS: 100 INJECTION, SOLUTION SUBCUTANEOUS at 08:50

## 2024-01-11 RX ADMIN — ATORVASTATIN CALCIUM 80 MG: 80 TABLET, FILM COATED ORAL at 16:17

## 2024-01-11 RX ADMIN — PANTOPRAZOLE SODIUM 40 MG: 40 TABLET, DELAYED RELEASE ORAL at 05:00

## 2024-01-11 RX ADMIN — PREDNISONE 5 MG: 5 TABLET ORAL at 08:50

## 2024-01-11 RX ADMIN — CEFEPIME 1000 MG: 1 INJECTION, POWDER, FOR SOLUTION INTRAMUSCULAR; INTRAVENOUS at 13:36

## 2024-01-11 RX ADMIN — INSULIN LISPRO 8 UNITS: 100 INJECTION, SOLUTION INTRAVENOUS; SUBCUTANEOUS at 11:16

## 2024-01-11 RX ADMIN — HEPARIN SODIUM 5000 UNITS: 5000 INJECTION INTRAVENOUS; SUBCUTANEOUS at 05:00

## 2024-01-11 RX ADMIN — HEPARIN SODIUM 12 UNITS/KG/HR: 10000 INJECTION, SOLUTION INTRAVENOUS at 14:07

## 2024-01-11 RX ADMIN — INSULIN LISPRO 1 UNITS: 100 INJECTION, SOLUTION INTRAVENOUS; SUBCUTANEOUS at 16:21

## 2024-01-11 RX ADMIN — TACROLIMUS 1 MG: 1 CAPSULE ORAL at 17:17

## 2024-01-11 RX ADMIN — TACROLIMUS 2 MG: 1 CAPSULE ORAL at 11:15

## 2024-01-11 RX ADMIN — HEPARIN SODIUM 3000 UNITS: 1000 INJECTION INTRAVENOUS; SUBCUTANEOUS at 14:26

## 2024-01-11 RX ADMIN — APIXABAN 2.5 MG: 2.5 TABLET, FILM COATED ORAL at 11:14

## 2024-01-11 RX ADMIN — ALBUMIN HUMAN 12.5 G: 0.25 SOLUTION INTRAVENOUS at 14:04

## 2024-01-11 RX ADMIN — INSULIN LISPRO 8 UNITS: 100 INJECTION, SOLUTION INTRAVENOUS; SUBCUTANEOUS at 08:52

## 2024-01-11 RX ADMIN — AMIODARONE HYDROCHLORIDE 150 MG: 50 INJECTION, SOLUTION INTRAVENOUS at 10:59

## 2024-01-11 RX ADMIN — CINACALCET 30 MG: 30 TABLET, FILM COATED ORAL at 08:50

## 2024-01-11 RX ADMIN — CALCIUM GLUCONATE 2 G: 20 INJECTION, SOLUTION INTRAVENOUS at 21:13

## 2024-01-11 RX ADMIN — ASPIRIN 81 MG CHEWABLE TABLET 81 MG: 81 TABLET CHEWABLE at 11:14

## 2024-01-11 RX ADMIN — CARVEDILOL 3.12 MG: 3.12 TABLET, FILM COATED ORAL at 09:34

## 2024-01-11 RX ADMIN — METOROPROLOL TARTRATE 5 MG: 5 INJECTION, SOLUTION INTRAVENOUS at 10:09

## 2024-01-11 RX ADMIN — AMIODARONE HYDROCHLORIDE 1 MG/MIN: 50 INJECTION, SOLUTION INTRAVENOUS at 11:09

## 2024-01-11 RX ADMIN — ALBUMIN (HUMAN) 12.5 G: 0.25 INJECTION, SOLUTION INTRAVENOUS at 14:07

## 2024-01-11 RX ADMIN — NOREPINEPHRINE BITARTRATE: 1 INJECTION, SOLUTION, CONCENTRATE INTRAVENOUS at 14:15

## 2024-01-11 RX ADMIN — CALCITRIOL 0.5 MCG: 0.25 CAPSULE, LIQUID FILLED ORAL at 08:50

## 2024-01-11 RX ADMIN — INSULIN LISPRO 2 UNITS: 100 INJECTION, SOLUTION INTRAVENOUS; SUBCUTANEOUS at 05:00

## 2024-01-11 RX ADMIN — INSULIN LISPRO 3 UNITS: 100 INJECTION, SOLUTION INTRAVENOUS; SUBCUTANEOUS at 08:52

## 2024-01-11 RX ADMIN — MILRINONE LACTATE IN DEXTROSE 0.25 MCG/KG/MIN: 200 INJECTION, SOLUTION INTRAVENOUS at 18:32

## 2024-01-11 RX ADMIN — MIDODRINE HYDROCHLORIDE 5 MG: 5 TABLET ORAL at 11:14

## 2024-01-11 NOTE — TREATMENT PLAN
As SBP remained low with ongoing afib with RVR, HR 130s, recommended transfer to ICU for CRRT initiation. CVVHD ordered: Qb 250ml/m, UF goal even, 4k/3cal bath.

## 2024-01-11 NOTE — ASSESSMENT & PLAN NOTE
Lab Results   Component Value Date    HGBA1C 7.7 (H) 01/07/2024    HGBA1C 6.5 (H) 02/23/2018   Anticipate hyperglycemia associated with higher steroid dose    Plan:   Continue AC/HS sliding scale algorithm   Lispro 8 units TID with meals  Lantus 10 units q12h   Adjust insulin regimen as needed to maintain goal -180  Carb controlled diet   If acidosis is persistent, consider checking beta hydroxybutyrate and initiating insulin infusion if elevated

## 2024-01-11 NOTE — HEMODIALYSIS
Pt in Hd room, once cannulated to start treatment primary rn called room to tell us his heart rate is elevated, pt placed on monitor where he was tachycardiac. We reached out to Dr. Camacho and primary rn reached out to cardiology. Pt medicated. Cardiology fellow came to bedside to evaluate pt. Pt was stating he was getting sweaty and feleing heart race. Cardiology fellow said to call RRT, RRT called at 1026.

## 2024-01-11 NOTE — ASSESSMENT & PLAN NOTE
Presented to [facility] ED with shortness of breath. Troponin and EKG checked as part of initial workup. EKG without acute ischemic changes   NSTEMI likely related to increased demand from acute hypoxic respiratory failure and septic shock   Troponin 77902 > 73850 > 62152  Elevated by cardiology and recommendation for cardiac cath however cath was canceled by interventional team, patient is at a high risk of periprocedural event, given his comobiditie   Continue with medical management  Aspirin, statin, Coreg  Cardiology is following

## 2024-01-11 NOTE — ASSESSMENT & PLAN NOTE
Rapid response was called earlier today due to rapid A-fib  She was held due to tachycardia  Patient was started on amiodarone drip and Eliquis  Check thyroid study  Cardiology is following

## 2024-01-11 NOTE — PROGRESS NOTES
[facility]  Progress Note  Name: Berto Faria I  MRN: [id number]  Unit/Bed#: [facility] 530-01 I Date of Admission: 1/8/2024   Date of Service: 1/11/2024 I Hospital Day: 3    Assessment/Plan   * Septic shock (HCC)  Assessment & Plan  Patient presented to the ER 1/7/24 with fatigue and hypoxia, suspected source of bacterial pneumonia   Levophed off since 1/8  Resolved  Monitor    Multifocal pneumonia  Assessment & Plan  Presented 1/7/23 to [facility] with new oxygen requirement, fatigue   1/7/24 CT Chest: Bilateral multilevel airspace consolidation, groundglass opacities, septal thickening and small bilateral pleural effusions secondary to volume overloaded as well as pneumonia.  Initial procalcitonin: 3.16 (1/8/24)  Cultures:  1/7/24 BCXx2: No growth   1/7/23 COVID/Flu/RSV: Negative   1/7/23 RP2: Negative   1/8/24 MRSA Cx: Negative  1/8/24 BCXx2: No growth    Continue with IV cefepime day #4  Follow on final cultures    Ischemic cardiomyopathy  Assessment & Plan  EF 40% with regional wall motion abnormality  History of CAD with a stents  Continue with above cardiac meds  Cardiology is following    New onset a-fib (HCC)  Assessment & Plan  Rapid response was called earlier today due to rapid A-fib  She was held due to tachycardia  Patient was started on amiodarone drip and Eliquis  Check thyroid study  Cardiology is following    Acute on chronic diastolic (congestive) heart failure (HCC)  Assessment & Plan  Wt Readings from Last 3 Encounters:   01/11/24 50.4 kg (111 lb 1.8 oz)   01/08/24 50 kg (110 lb 3.7 oz)   08/21/23 51.4 kg (113 lb 6.4 oz)       Likely related to NSTEMI from septic shock and element of stress cardiomyopathy, but cannot rule about possible ischemic element given regional wall changes   1/7/24 TTE: LV cavity size is normal. Wall thickness is mildly increased. LVEF 40%. Systolic function is moderately reduced. Following segments are hypokinetic: mid anteroseptal, mid  inferior, apical anterior, apical septal, apical inferior and apex.RV normal. Mild MR. Mild TR  Cardiology is following  Volume management with HD  Cardiac cath was canceled yesterday by interventional team  Continue with Coreg        Acute respiratory failure with hypoxia ([facility])  Assessment & Plan  Secondary to  bacterial pneumonia and volume overload   Wean supplemental oxygen as able to maintain SpO2 > 90%   Continue volume removal with HD  Antibiotic plan detailed above    Continue pulmonary hygiene. Incentive spirometer q1h while awake, encourage coughing and deep breathing. Upright positioning  PT/OT eval    NSTEMI (non-ST elevated myocardial infarction) ([facility])  Assessment & Plan  Presented to [facility] ED with shortness of breath. Troponin and EKG checked as part of initial workup. EKG without acute ischemic changes   NSTEMI likely related to increased demand from acute hypoxic respiratory failure and septic shock   Troponin 79240 > 85144 > 83643  Elevated by cardiology and recommendation for cardiac cath however cath was canceled by interventional team, patient is at a high risk of periprocedural event, given his comobiditie   Continue with medical management  Aspirin, statin, Coreg  Cardiology is following         Renal transplant failure and rejection  Assessment & Plan  History of renal transplant chronically on tacrolimus 1 mg p.o. nightly, 2 mg p.o. every morning, AZA 50 daily, Prednisone 5 Daily    Continue tacrolimus 2mg qAM, 1mg qPM  Prednisone 5 mg qD  Holding home azathioprine 50mg daily   Nephrology is following    ESRD (end stage renal disease) ([facility])  Assessment & Plan  Lab Results   Component Value Date    EGFR 7 01/11/2024    EGFR 12 01/10/2024    EGFR 17 01/09/2024    CREATININE 7.18 (H) 01/11/2024    CREATININE 4.92 (H) 01/10/2024    CREATININE 3.73 (H) 01/09/2024   Dialysis regimen: Tuesday, Thursday, Saturday via right arm AV fistula   1/8/24: Right IJ temporary HD catheter placement   1/8/24:  CRRT initiated   Nephrology is following      Chronic anemia  Assessment & Plan  Monitor H&H    S/P AKA (above knee amputation), right (HCC)  Assessment & Plan  Secondary to chronic osteomyelitis     Type 1 diabetes mellitus on insulin therapy (HCC)  Assessment & Plan  Lab Results   Component Value Date    HGBA1C 7.7 (H) 01/07/2024       Recent Labs     01/11/24  0425 01/11/24  0619 01/11/24  0854 01/11/24  1105   POCGLU 319* 314* 340* 242*       Blood Sugar Average: Last 72 hrs:  (P) 214.7073430391840562    Hyperglycemia  Currently on Lantus twice daily and Humalog with meals  Continue with insulin sliding scale  Adjust as needed               VTE Pharmacologic Prophylaxis:   High Risk (Score >/= 5) - Pharmacological DVT Prophylaxis Ordered: apixaban (Eliquis). Sequential Compression Devices Ordered.    Mobility:   Basic Mobility Inpatient Raw Score: 17  JH-HLM Goal: 5: Stand one or more mins  JH-HLM Achieved: 5: Stand (1 or more minutes)  HLM Goal achieved. Continue to encourage appropriate mobility.    Patient Centered Rounds: I performed bedside rounds with nursing staff today.   Discussions with Specialists or Other Care Team Provider: CM, Cardiology, CC    Education and Discussions with Family / Patient: Updated  (wife) by phone    Total Time Spent on Date of Encounter in care of patient: 35 mins. This time was spent on one or more of the following: performing physical exam; counseling and coordination of care; obtaining or reviewing history; documenting in the medical record; reviewing/ordering tests, medications or procedures; communicating with other healthcare professionals and discussing with patient's family/caregivers.    Current Length of Stay: 3 day(s)  Current Patient Status: Inpatient   Certification Statement: The patient will continue to require additional inpatient hospital stay due to rapid A-fib  Discharge Plan: Anticipate discharge in 48-72 hrs to home with home services.    Code  Status: Level 1 - Full Code    Subjective:   Patient seen and examined  Rapid response called earlier today due to rapid A-fib during dialysis  Started on amiodarone drip   Currently denies chest pain or shortness of breath  ICU chart reviewed      Objective:     Vitals:   Temp (24hrs), Av.2 °F (36.8 °C), Min:98.1 °F (36.7 °C), Max:98.7 °F (37.1 °C)    Temp:  [98.1 °F (36.7 °C)-98.7 °F (37.1 °C)] 98.1 °F (36.7 °C)  HR:  [] 125  Resp:  [18-30] 30  BP: ()/(47-84) 95/59  SpO2:  [91 %-97 %] 93 %  Body mass index is 19.07 kg/m².     Input and Output Summary (last 24 hours):     Intake/Output Summary (Last 24 hours) at 2024 1203  Last data filed at 2024 1138  Gross per 24 hour   Intake 1036.01 ml   Output --   Net 1036.01 ml       Physical Exam:   Physical Exam   Patient is awake alert oriented no acute distress  Comfortable in bed  Tearing from right eye  Lung clear to auscultation bilateral  Heart regular with tachycardia  Abdomen soft nontender  Left lower extremities no edema  S/P R AKA    Additional Data:     Labs:  Results from last 7 days   Lab Units 24  0531 01/10/24  0336 24  0334   WBC Thousand/uL 10.99*   < > 13.49*   HEMOGLOBIN g/dL 9.8*   < > 8.9*   HEMATOCRIT % 30.3*   < > 27.6*   PLATELETS Thousands/uL 194   < > 187   NEUTROS PCT %  --   --  83*   LYMPHS PCT %  --   --  9*   MONOS PCT %  --   --  7   EOS PCT %  --   --  0    < > = values in this interval not displayed.     Results from last 7 days   Lab Units 24  0531 24  0429 24  1015   SODIUM mmol/L 133*   < > 135   POTASSIUM mmol/L 5.2   < > 3.5   CHLORIDE mmol/L 96   < > 96   CO2 mmol/L 18*   < > 27   BUN mg/dL 81*   < > 30*   CREATININE mg/dL 7.18*   < > 5.36*   ANION GAP mmol/L 19   < > 12   CALCIUM mg/dL 8.7   < > 9.3   ALBUMIN g/dL  --   --  3.9   TOTAL BILIRUBIN mg/dL  --   --  0.65   ALK PHOS U/L  --   --  98   ALT U/L  --   --  19   AST U/L  --   --  27   GLUCOSE RANDOM mg/dL 319*   < > 119     < > = values in this interval not displayed.     Results from last 7 days   Lab Units 01/07/24  1015   INR  1.00     Results from last 7 days   Lab Units 01/11/24  1105 01/11/24  0854 01/11/24  0619 01/11/24  0425 01/10/24  2049 01/10/24  1706 01/10/24  1053 01/10/24  0754 01/10/24  0552 01/10/24  0550 01/09/24  2052 01/09/24  1953   POC GLUCOSE mg/dl 242* 340* 314* 319* 234* 337* 98 97 109 106 146* 130     Results from last 7 days   Lab Units 01/07/24  1015   HEMOGLOBIN A1C % 7.7*     Results from last 7 days   Lab Units 01/08/24  1628 01/08/24  0429 01/07/24  1109 01/07/24  1015   LACTIC ACID mmol/L 1.5  --   --  1.3   PROCALCITONIN ng/ml  --  3.16* 0.44*  --        Lines/Drains:  Invasive Devices       Peripheral Intravenous Line  Duration             Peripheral IV 01/08/24 Left;Ventral (anterior) Forearm 2 days              Line  Duration             Hemodialysis AV Fistula Right Upper arm -- days              Hemodialysis Catheter  Duration             HD Permanent Double Catheter 2 days                      Telemetry:  Telemetry Orders (From admission, onward)               24 Hour Telemetry Monitoring  Continuous x 24 Hours (Telem)        Question:  Reason for 24 Hour Telemetry  Answer:  Decompensated CHF- and any one of the following: continuous diuretic infusion or total diuretic dose >200 mg daily, associated electrolyte derangement (I.e. K < 3.0), ionotropic drip (continuous infusion), hx of ventricular arrhythmia, or new EF < 35%                     Telemetry Reviewed: Atrial fibrillation. HR averaging 113  Indication for Continued Telemetry Use: Arrthymias requiring medical therapy             Imaging: No pertinent imaging reviewed.    Recent Cultures (last 7 days):   Results from last 7 days   Lab Units 01/10/24  0938 01/08/24  2101 01/07/24  1119 01/07/24  1109   BLOOD CULTURE   --  No Growth at 48 hrs.  No Growth at 48 hrs. No Growth at 72 hrs. No Growth at 72 hrs.   SPUTUM CULTURE  Test not  performed. Suggest repeat specimen.  --   --   --    GRAM STAIN RESULT  >10 squamous epithelial cells/lpf, indicating orophayngeal contamination.*  Rare Polys*  3+ Gram negative rods*  1+ Gram positive cocci in pairs, chains and clusters*  --   --   --        Last 24 Hours Medication List:   Current Facility-Administered Medications   Medication Dose Route Frequency Provider Last Rate    albuterol  2 puff Inhalation Q4H PRN GAVINO Melissa      amiodarone (CORDARONE) 900 mg in dextrose 5 % 500 mL infusion  1 mg/min Intravenous Continuous Camelia Gutierrez, DO 1 mg/min (01/11/24 1109)    Followed by    amiodarone (CORDARONE) 900 mg in dextrose 5 % 500 mL infusion  0.5 mg/min Intravenous Continuous Camelia Cummingsid, DO      apixaban  2.5 mg Oral BID Camelia Gutierrez DO      aspirin  81 mg Oral QAM GAVINO Melissa      atorvastatin  80 mg Oral Daily With Dinner GAVINO Melissa      calcitriol  0.5 mcg Oral Once per day on Tuesday Thursday Saturday GAVINO Melissa      carvedilol  3.125 mg Oral BID With Meals GAVINO Melissa      cefepime  1,000 mg Intravenous Q24H GAVINO Melissa      cinacalcet  30 mg Oral Daily GAVINO Melissa      glycerin-hypromellose-  1 drop Right Eye Q4H PRN Jose Valle DO      insulin glargine  10 Units Subcutaneous Q12H [facility] Jesica Hay PA-C      insulin lispro  1-6 Units Subcutaneous HS GAVINO Melissa      insulin lispro  1-6 Units Subcutaneous TID With Meals Jesica Hay PA-C      insulin lispro  8 Units Subcutaneous TID With Meals GAVINO Melissa      metoprolol  5 mg Intravenous Q6H PRN Camelia Gutierrez DO      ondansetron  4 mg Intravenous Q8H PRN GAVINO Melissa      pantoprazole  40 mg Oral Early Morning GAVINO Melissa      predniSONE  5 mg Oral Daily GAVINO Melissa      tacrolimus  1 mg Oral QPM GAVINO Melissa       tacrolimus  2 mg Oral GAVINO Antonio          Today, Patient Was Seen By: Jose Valle DO    **Please Note: This note may have been constructed using a voice recognition system.**

## 2024-01-11 NOTE — ASSESSMENT & PLAN NOTE
Presented 23 to [facility] with new oxygen requirement, fatigue   Relevant imagin/7/24 CT Chest: Bilateral multilevel airspace consolidation, groundglass opacities, septal thickening and small bilateral pleural effusions secondary to volume overloaded as well as pneumonia.  Initial procalcitonin: 3.16 (24)  Possibly falsely elevated due to ESRD, but had lower levels in the past   Cultures:  24 BCXx2: No growth (24 hours)   23 COVID/Flu/RSV: Negative   23 RP2: Negative   24 MRSA Cx: Negative    24 BCXx2: No growth (x 48 hrs)     Plan:   Current Antibiotics: Cefepime for 5d course  Continue to monitor fever and WBC curve   Follow up cultures

## 2024-01-11 NOTE — ASSESSMENT & PLAN NOTE
Likely related to NSTEMI from septic shock and element of stress cardiomyopathy, but cannot rule about possible ischemic element given regional wall changes   1/7/24 TTE: LV cavity size is normal. Wall thickness is mildly increased. LVEF 40%. Systolic function is moderately reduced. Following segments are hypokinetic: mid anteroseptal, mid inferior, apical anterior, apical septal, apical inferior and apex.RV normal. Mild MR. Mild TR    Plan:   Volume removal with CRRT  Daily weights  Continue holding home afterload reduction agents/AV aubrie blockers given resolving septic shock:   Carvedilol 6.25mg BID  Isosorbide mononitrate 30mg q24h   Metoprolol succinate 50mg q24  Nifedipine 30mg q24 hours   Cardiac cath for ischemic work-up, timing per cardiology  1000mL fluid restriction   Meds as above for NSTEMI  Cardiology consulted, appreciate recommendations

## 2024-01-11 NOTE — RESTORATIVE TECHNICIAN NOTE
Restorative Technician Note      Patient Name: Berto RANGEL Faria     Deferring ambulation resting comfortably in bed no needs at this time

## 2024-01-11 NOTE — ASSESSMENT & PLAN NOTE
Resolved  Patient presented to the ER 1/7/24 with fatigue and hypoxia, suspected source of bacterial pneumonia   Likely component of adrenal insufficiency given chronic, daily prednisone use   Not given 30ml/kg IVF resuscitation in the setting of anuric ESRD      Plan:  Levophed off since 1/8  Maintain MAP 60-65 given ESRD  Tolerated volume removal with CRRT  Was transitioned back to home Prednisone 5 mg qD   Continue broad spectrum antibiotics pending culture results

## 2024-01-11 NOTE — ASSESSMENT & PLAN NOTE
Wt Readings from Last 3 Encounters:   01/11/24 50.4 kg (111 lb 1.8 oz)   01/08/24 50 kg (110 lb 3.7 oz)   08/21/23 51.4 kg (113 lb 6.4 oz)       Likely related to NSTEMI from septic shock and element of stress cardiomyopathy, but cannot rule about possible ischemic element given regional wall changes   1/7/24 TTE: LV cavity size is normal. Wall thickness is mildly increased. LVEF 40%. Systolic function is moderately reduced. Following segments are hypokinetic: mid anteroseptal, mid inferior, apical anterior, apical septal, apical inferior and apex.RV normal. Mild MR. Mild TR  Cardiology is following  Volume management with HD  Cardiac cath was canceled yesterday by interventional team  Continue with Coreg

## 2024-01-11 NOTE — ASSESSMENT & PLAN NOTE
As evidenced by low ScvO2, cool extremities, and newly depressed/worsening EF on STAT echo 01/11.     01/11 echo: pending    Plan:  Trend ScvO2  Consider inotropy if persistently low ScvO2  Monitor endpoints of resuscitation  Continuous cardiopulmonary monitoring  Cardiac cath plan per interventional cardiology  Continue CRRT for volume management

## 2024-01-11 NOTE — ASSESSMENT & PLAN NOTE
Patient presented to the ER 1/7/24 with fatigue and hypoxia, suspected source of bacterial pneumonia   Levophed off since 1/8  Resolved  Monitor

## 2024-01-11 NOTE — ASSESSMENT & PLAN NOTE
Presented to [facility] ED with shortness of breath. Troponin and EKG checked as part of initial workup. EKG without acute ischemic changes   NSTEMI likely related to increased demand from acute hypoxic respiratory failure and septic shock   Troponin 35718 > 42147 > 32878     Plan:  ASA 81mg daily   Atorvastatin 80mg qHS   Continue holding home afterload reduction agents/AV aubrie blockers given resolving septic shock:   Carvedilol 6.25mg BID  Isosorbide mononitrate 30mg q24h   Metoprolol succinate 50mg q24  Nifedipine 30mg q24 hours   Eventual cardiac cath  Cardiology consulted, appreciate recommendations

## 2024-01-11 NOTE — ASSESSMENT & PLAN NOTE
Lab Results   Component Value Date    HGBA1C 7.7 (H) 01/07/2024       Recent Labs     01/11/24  0425 01/11/24  0619 01/11/24  0854 01/11/24  1105   POCGLU 319* 314* 340* 242*       Blood Sugar Average: Last 72 hrs:  (P) [id number]    Hyperglycemia  Currently on Lantus twice daily and Humalog with meals  Continue with insulin sliding scale  Adjust as needed

## 2024-01-11 NOTE — CASE MANAGEMENT
Case Management Assessment    Patient name Berto Faria  Location [facility]-01 MRN [id number]  : 1970 Date 2024       Current Admission Date: 2024  Current Admission Diagnosis:NSTEMI (non-ST elevated myocardial infarction) ([facility])   Patient Active Problem List    Diagnosis Date Noted    New onset a-fib ([facility]) 2024    Septic shock ([facility]) 2024    Hx of AKA (above knee amputation) ([facility]) 2024    Renal transplant failure and rejection 2024    Acute on chronic diastolic (congestive) heart failure ([facility]) 10/10/2022    Vasovagal syncope 10/01/2022    Elevated MCV 02/10/2021    Hyperbilirubinemia 02/10/2021    Acute on chronic diastolic CHF (congestive heart failure) ([facility]) 02/10/2021    Elevated procalcitonin 02/10/2021    Mitral valve insufficiency 02/10/2021    Abnormal EKG 02/10/2021    Multifocal pneumonia 2020    Community acquired pneumonia of right lower lobe of lung 2020    Pancreatic lesion 2020    Abnormal CT scan, colon 2020    Chronic kidney disease, unspecified 01/15/2020    Coronary artery disease involving native coronary artery of native heart without angina pectoris 2019    Pre-operative cardiovascular examination 2019    ESRD (end stage renal disease) ([facility]) 2019    Acute pulmonary edema ([facility]) 10/20/2019    Chronic anemia 10/14/2019    Pulmonary hypertension ([facility]) 10/13/2019    Acute blood loss anemia 2019    Acute respiratory failure with hypoxia ([facility]) 2019    S/P AKA (above knee amputation), right ([facility]) 2019    Depressed left ventricular ejection fraction 2019    Hyperphosphatemia 2018    Hyponatremia 2018    Gastrointestinal hemorrhage 2018    Severe sepsis ([facility]) 2018    Urinary retention 2017    Chronic osteomyelitis of tibia ([facility]) 2017    Elevated troponin 2017    Diarrhea 2017    Cellulitis of ankle 2017    NSTEMI (non-ST elevated  myocardial infarction) ([facility]) 09/23/2017    Closed fracture of distal end of right tibia with routine healing 07/03/2017    Osteomyelitis ([facility]) 12/07/2016    Poor circulation 12/07/2016    Type 1 diabetes mellitus on insulin therapy ([facility]) 10/26/2016    Renal transplant, status post 10/26/2016    Hyperkalemia 10/26/2016    Immunosuppression ([facility]) 11/04/2014    Hypertension 08/04/2010      LOS (days): 3  Geometric Mean LOS (GMLOS) (days): 5.1  Days to GMLOS:2.1     OBJECTIVE:  Risk of Unplanned Readmission Score: 24.58   Current admission status: Inpatient    Preferred Pharmacy:   [id number] - [place] - [address] [address]  Phone: [phone] Fax: [phone]    Primary Care Provider: Dalton Anderson DO    Primary Insurance: MEDICARE  Secondary Insurance: CAPITAL    ASSESSMENT:  Active Health Care Proxies       Sylvie Brunson Health Care Representative - Spouse   Primary Phone: [phone] (Mobile)  Home Phone: [phone]     Advance Directives  Does patient have a Health Care POA?: No  Does patient have Advance Directives?: No  Primary Contact: pt's Significant Other (SO) Sylvie Brunson / phone# [phone]    Patient Information  Admitted from:: Home  Mental Status: Alert  Assessment information provided by:: Patient  Primary Caregiver: Self  Support Systems: Spouse/significant other  County of Residence: [place]  What city do you live in?: [place]  Home entry access options. Select all that apply.: No steps to enter home  Type of Current Residence: [facility]  Living Arrangements: Lives w/ Spouse/significant other  Is patient a ?: No    Activities of Daily Living Prior to Admission  Functional Status: Independent  Completes ADLs independently?: Yes  Ambulates independently?: Yes  Does patient use assisted devices?: Yes  Assisted Devices (DME) used: Walker, Wheelchair  Does the patient have a history of Short-Term Rehab?: No  Does patient  have a history of HHC?: No    Patient Information Continued  Income Source: SSI/SSD  Does patient have prescription coverage?: Yes  Does patient receive dialysis treatments?: Yes (Pt attends [facility] on Tu/Th/Sat schedule.)  Does patient have a history of substance abuse?: No  Does patient have a history of Mental Health Diagnosis?: No    Means of Transportation  Means of Transport to Appts:: Drives Self    Housing Stability: Low Risk  (1/10/2024)    Housing Stability Vital Sign     Unable to Pay for Housing in the Last Year: No     Number of Places Lived in the Last Year: 1     Unstable Housing in the Last Year: No   Food Insecurity: No Food Insecurity (1/10/2024)    Hunger Vital Sign     Worried About Running Out of Food in the Last Year: Never true     Ran Out of Food in the Last Year: Never true   Transportation Needs: No Transportation Needs (1/10/2024)    PRAPARE - Transportation     Lack of Transportation (Medical): No     Lack of Transportation (Non-Medical): No   Utilities: Not At Risk (1/10/2024)    [facility] Utilities     Threatened with loss of utilities: No     Additional Comments: CM reviewed d/c planning process including the following: identifying help at home, patient preference for d/c planning needs, availability of treatment team to discuss questions or concerns patient and/or family may have regarding understanding medications and recognizing signs and symptoms once discharged. CM also encouraged patient to follow up with all recommended appointments after discharge. Patient advised of importance for patient and family to participate in managing patient’s medical well being. Patient/caregiver received discharge checklist. Content reviewed. Patient/caregiver encouraged to participate in discharge plan of care prior to discharge home.

## 2024-01-11 NOTE — ASSESSMENT & PLAN NOTE
Lab Results   Component Value Date    EGFR 7 01/11/2024    EGFR 12 01/10/2024    EGFR 17 01/09/2024    CREATININE 7.18 (H) 01/11/2024    CREATININE 4.92 (H) 01/10/2024    CREATININE 3.73 (H) 01/09/2024   Dialysis regimen: Tuesday, Thursday, Saturday via right arm AV fistula   1/8/24: Right IJ temporary HD catheter placement   1/8/24: CRRT initiated   Nephrology is following

## 2024-01-11 NOTE — ASSESSMENT & PLAN NOTE
Secondary to likely bacterial pneumonia and element of volume overload   1/7/24 CT Chest: Bilateral multilevel airspace consolidation, groundglass opacities, septal thickening and small bilateral pleural effusions secondary to volume overloaded as well as pneumonia.      Plan:   Requiring MFNC as of 01/11  Wean supplemental oxygen as able to maintain SpO2 > 90%   Continue volume removal with CRRT  Antibiotic plan detailed above    Continue pulmonary hygiene. Incentive spirometer q1h while awake, encourage coughing and deep breathing. Upright positioning  Suction as needed and closely monitor secretions. Maintain HOB >30 degrees. Q4h oral care with chlorhexidine BID

## 2024-01-11 NOTE — QUICK NOTE
I was called to dialysis to evaluate the patient as he was noted to be in rapid atrial fibrillation. Patient was symptomatic with palpitations. HD stable with  on exam. No lightheadedness or dizziness. No previous history of atrial fibrillation.   Gave 5mg IV lopressor as well as amiodarone bolus followed by drip. EKG ordered.   Discussed anticoagulation with patient as well as risk for stroke given [id number] of 3. Patient agreeable to eliquis, will start reduced dose of 2.5mg BID due to ESRD and weight <60 kg.

## 2024-01-11 NOTE — ASSESSMENT & PLAN NOTE
Presented 1/7/23 to [facility] with new oxygen requirement, fatigue   1/7/24 CT Chest: Bilateral multilevel airspace consolidation, groundglass opacities, septal thickening and small bilateral pleural effusions secondary to volume overloaded as well as pneumonia.  Initial procalcitonin: 3.16 (1/8/24)  Cultures:  1/7/24 BCXx2: No growth   1/7/23 COVID/Flu/RSV: Negative   1/7/23 RP2: Negative   1/8/24 MRSA Cx: Negative  1/8/24 BCXx2: No growth    Continue with IV cefepime day #4  Follow on final cultures

## 2024-01-11 NOTE — PROCEDURES
Arterial Line Insertion    Date/Time: 1/11/2024 5:30 PM    Performed by: Giacomo Molina PA-C  Authorized by: Giacomo Molina PA-C    Patient location:  Bedside and ICU  Consent:     Consent obtained:  Verbal    Consent given by:  Patient    Risks discussed:  Bleeding, ischemia, repeat procedure, infection and pain  Universal protocol:     Procedure explained and questions answered to patient or proxy's satisfaction: yes      Site/side marked: yes      Immediately prior to procedure a time out was called: yes      Patient identity confirmed:  Verbally with patient, arm band, provided demographic data and hospital-assigned identification number  Indications:     Indications: hemodynamic monitoring and frequent labs / infusion    Pre-procedure details:     Skin preparation:  Chlorhexidine    Preparation: Patient was prepped and draped in sterile fashion    Anesthesia (see MAR for exact dosages):     Anesthesia method:  Local infiltration    Local anesthetic:  Lidocaine 1% w/o epi  Procedure details:     Location / Tip of Catheter:  Radial (mid forearm radial)    Laterality:  Left    Needle gauge:  18 G    Placement technique:  Seldinger    Number of attempts:  1    Successful placement: yes      Transducer: waveform confirmed    Post-procedure details:     Post-procedure:  Sterile dressing applied and sutured    Patient tolerance of procedure:  Tolerated well, no immediate complications

## 2024-01-11 NOTE — PROGRESS NOTES
10:23am addendum: The patient's systolic blood pressures dropped down to 60 status post IV Lopressor dose, heart rate remains in the 130s, patient is now diaphoretic and remains symptomatic.  Cardiology called again to bedside.  Will remove needles cannulation and hold on dialysis for today.  Patient will require likely transfer to higher level of care and potentially CRRT. Recommend ICU transfer.    Progress Note - Nephrology   Berto Faria 53 y.o. male MRN: [id number]  Unit/Bed#: [facility] 530-01 Encounter: [id number]      Assessment / Plan:  ESRD on HD TTS at [facility] -patient was on CRRT in light of cardiogenic shock and hypotension initially on pressors, now off pressors.  -Patient due for hemodialysis today but currently held as patient tachycardic and symptomatic.  -cardiology to provide IV lopressor. Will begin HD once HR improves for acidosis and patient due to for this today  -Target weight 47.5 kg outpatient  -When accounting for prosthesis, today's weight is less than the prior days.    Access-AV fistula present and functioning, now with permacath placed for CRRT initiation  Anemia due to other ESRD-on Mircera outpatient, Hgb near goal at 9.8  Secondary hyperparathyroidism renal origin-continue Cinacalcet 30 mg daily, calcitriol 0.5 mcg 3 times weekly  Hyperphosphatemia - off binders, monitor phos  History of failed kidney transplant-on azathioprine and tacrolimus as an outpatient, azathioprine on hold, continue tacrolimus home dose for now.  Should have these down titrated per outpatient nephrologist at dialysis unit.  Patient also chronically on prednisone but has received IV hydrocortisone inpatient to assist with potential adrenal insufficiency  Cardiogenic versus septic shock in setting of multifocal pneumonia-on antibiotics per critical care team  Elevated troponin-cardiology following, to begin aspirin with high intensity statin therapy  Acute on chronic diastolic CHF-UF as tolerated on HD,  "continue oral fluid restriction, cardio followss  New onset rapid afib - management per cardiology, no improvement in HR s/p coreg  Non gapped acidosis d/t ESRD-  correct with HD, bicarb 18    Subjectie:   Feels his heart is racing. To begin HD but held due to irregular rhythm and tachycardia.       Objective:     Vitals: Blood pressure 121/79, pulse (!) 131, temperature 98.1 °F (36.7 °C), resp. rate 18, height 5' 4\" (1.626 m), weight 50.4 kg (111 lb 1.8 oz), SpO2 96%.,Body mass index is 19.07 kg/m².Temp (24hrs), Av.2 °F (36.8 °C), Min:98.1 °F (36.7 °C), Max:98.7 °F (37.1 °C)      Weight (last 2 days)       Date/Time Weight    24 0504 50.4 (111.11)     Weight: WITH PROSTHETIC ON; PT STATES IT WEIGHS 3.2 KG at 24 0504    01/10/24 0555 47.6 (104.94)    24 0800 48.5 (106.92)    24 0556 48.5 (106.92)              Intake/Output Summary (Last 24 hours) at 2024 0945  Last data filed at 1/10/2024 1400  Gross per 24 hour   Intake 393.91 ml   Output --   Net 393.91 ml     I/O last 24 hours:  In: 393.9 [P.O.:240; I.V.:103.9; IV Piggyback:50]  Out: -         Physical Exam:   Physical Exam  Vitals reviewed.   Constitutional:       General: He is not in acute distress.     Appearance: Normal appearance. He is well-developed. He is not diaphoretic.   HENT:      Head: Normocephalic and atraumatic.      Nose: Nose normal.      Mouth/Throat:      Mouth: Mucous membranes are moist.      Pharynx: No oropharyngeal exudate.   Eyes:      General: No scleral icterus.        Right eye: No discharge.         Left eye: No discharge.   Neck:      Thyroid: No thyromegaly.   Cardiovascular:      Rate and Rhythm: Tachycardia present. Rhythm irregular.      Heart sounds: Normal heart sounds.   Pulmonary:      Effort: Pulmonary effort is normal.      Breath sounds: Normal breath sounds. No wheezing or rales.   Abdominal:      General: Bowel sounds are normal. There is no distension.      Palpations: Abdomen is soft. "      Tenderness: There is no abdominal tenderness.   Musculoskeletal:         General: No swelling.      Cervical back: Neck supple.      Comments: R AKA   Lymphadenopathy:      Cervical: No cervical adenopathy.   Skin:     General: Skin is warm and dry.      Findings: No rash.   Neurological:      General: No focal deficit present.      Mental Status: He is alert.      Comments: awake   Psychiatric:         Mood and Affect: Mood normal.         Behavior: Behavior normal.         Invasive Devices       Peripheral Intravenous Line  Duration             Peripheral IV 01/08/24 Left;Ventral (anterior) Forearm 2 days              Line  Duration             Hemodialysis AV Fistula Right Upper arm -- days              Hemodialysis Catheter  Duration             HD Permanent Double Catheter 2 days                    Medications:    Scheduled Meds:  Current Facility-Administered Medications   Medication Dose Route Frequency Provider Last Rate    albuterol  2 puff Inhalation Q4H PRN GAVINO Melissa      aspirin  81 mg Oral QAM GAVINO Melissa      atorvastatin  80 mg Oral Daily With Dinner GAVINO Melissa      calcitriol  0.5 mcg Oral Once per day on Tuesday Thursday Saturday GAVINO Melissa      carvedilol  3.125 mg Oral BID With Meals GAVINO Melissa      cefepime  1,000 mg Intravenous Q24H GAVINO Melissa      cinacalcet  30 mg Oral Daily GAVINO Melissa      heparin (porcine)  5,000 Units Subcutaneous Q8H [facility] GAVINO Melissa      insulin glargine  10 Units Subcutaneous Q12H [facility] Jesica Hay PA-C      insulin lispro  1-6 Units Subcutaneous HS GAVINO Melissa      insulin lispro  1-6 Units Subcutaneous TID With Meals Jesica Hay PA-C      insulin lispro  8 Units Subcutaneous TID With Meals GAVINO Melissa      ondansetron  4 mg Intravenous Q8H PRN GAVINO Melissa      pantoprazole   40 mg Oral Early Morning Diandra Spironello V, CRNP      predniSONE  5 mg Oral Daily Diandra Spironello V, CRNP      tacrolimus  1 mg Oral QPM Diandra Spironello V, CRNP      tacrolimus  2 mg Oral QAM Diandra Spironello V, CRNP         PRN Meds:.  albuterol    ondansetron    Continuous Infusions:         LAB RESULTS:      Results from last 7 days   Lab Units 01/11/24  0531 01/10/24  0336 01/09/24  1540 01/09/24  1005 01/09/24  0334 01/09/24  0332 01/08/24  2155 01/08/24  1628 01/08/24  0429 01/07/24  1015 01/07/24  1015   WBC Thousand/uL 10.99* 11.39*  --   --  13.49*  --   --   --  13.80*  --  13.58*   HEMOGLOBIN g/dL 9.8* 8.9*  --   --  8.9*  --   --   --  10.3*  --  12.0   HEMATOCRIT % 30.3* 27.8*  --   --  27.6*  --   --   --  32.2*  --  37.9   PLATELETS Thousands/uL 194 197  --   --  187  --   --   --  215  --  250   NEUTROS PCT %  --   --   --   --  83*  --   --   --   --   --  81*   LYMPHS PCT %  --   --   --   --  9*  --   --   --   --   --  10*   LYMPHO PCT %  --   --   --   --   --   --   --   --  12*  --   --    MONOS PCT %  --   --   --   --  7  --   --   --   --   --  8   MONO PCT %  --   --   --   --   --   --   --   --  9  --   --    EOS PCT %  --   --   --   --  0  --   --   --  0  --  1   POTASSIUM mmol/L 5.2 4.5 4.9 4.2  --  5.3 5.3 5.3 4.6  --  3.5   CHLORIDE mmol/L 96 100 100 100  --  99 97 92* 95*  --  96   CO2 mmol/L 18* 25 21 24  --  21 19* 15* 23  --  27   BUN mg/dL 81* 51* 43* 52*  --  58* 67* 64* 47*  --  30*   CREATININE mg/dL 7.18* 4.92* 3.73* 4.44*  --  5.28* 6.67* 7.21* 7.05*  --  5.36*   CALCIUM mg/dL 8.7 8.8 9.3 9.0  --  8.7 8.3* 7.8* 8.9  --  9.3   ALK PHOS U/L  --   --   --   --   --   --   --   --   --   --  98   ALT U/L  --   --   --   --   --   --   --   --   --   --  19   AST U/L  --   --   --   --   --   --   --   --   --   --  27   MAGNESIUM mg/dL 3.2* 2.1 2.1 2.0  --  2.1 2.2 2.2 2.3   < >  --    PHOSPHORUS mg/dL 3.4 3.1 2.9 3.0  --  4.5 5.2* 5.5*  --   --   --     < > =  "values in this interval not displayed.       CUTURES:  Lab Results   Component Value Date    BLOODCX No Growth at 48 hrs. 01/08/2024    BLOODCX No Growth at 48 hrs. 01/08/2024    BLOODCX No Growth at 72 hrs. 01/07/2024    BLOODCX No Growth at 72 hrs. 01/07/2024    BLOODCX No Growth After 5 Days. 04/27/2023    BLOODCX No Growth After 5 Days. 04/27/2023    BLOODCX No Growth After 5 Days. 10/10/2022    BLOODCX No Growth After 5 Days. 10/10/2022    URINECX No Growth <1000 cfu/mL 02/23/2019    URINECX No Growth <1000 cfu/mL 07/27/2016                 Portions of the record may have been created with voice recognition software. Occasional wrong word or \"sound a like\" substitutions may have occurred due to the inherent limitations of voice recognition software. Read the chart carefully and recognize, using context, where substitutions have occurred.If you have any questions, please contact the dictating provider.    "

## 2024-01-11 NOTE — CONSULTS
[facility]  Consult  Name: Berto Faria 53 y.o. male I MRN: [id number]  Unit/Bed#: [facility] 515-01 I Date of Admission: 1/8/2024   Date of Service: 1/11/2024 I Hospital Day: 3    Consults    Assessment/Plan   * Cardiogenic shock (HCC)  Assessment & Plan  As evidenced by low ScvO2, cool extremities, and newly depressed/worsening EF on STAT echo 01/11.     01/11 echo: pending    Plan:  Trend ScvO2  Consider inotropy if persistently low ScvO2  Monitor endpoints of resuscitation  Continuous cardiopulmonary monitoring  Cardiac cath plan per interventional cardiology  Continue CRRT for volume management    Acute respiratory failure with hypoxia (HCC)  Assessment & Plan  Secondary to likely bacterial pneumonia and element of volume overload   1/7/24 CT Chest: Bilateral multilevel airspace consolidation, groundglass opacities, septal thickening and small bilateral pleural effusions secondary to volume overloaded as well as pneumonia.      Plan:   Requiring MFNC as of 01/11  Wean supplemental oxygen as able to maintain SpO2 > 90%   Continue volume removal with CRRT  Antibiotic plan detailed above    Continue pulmonary hygiene. Incentive spirometer q1h while awake, encourage coughing and deep breathing. Upright positioning  Suction as needed and closely monitor secretions. Maintain HOB >30 degrees. Q4h oral care with chlorhexidine BID      NSTEMI (non-ST elevated myocardial infarction) (HCC)  Assessment & Plan  Presented to [facility] ED with shortness of breath. Troponin and EKG checked as part of initial workup. EKG without acute ischemic changes   NSTEMI likely related to increased demand from acute hypoxic respiratory failure and septic shock   Troponin 02949 > 41063 > 70312     Plan:  ASA 81mg daily   Atorvastatin 80mg qHS   Continue holding home afterload reduction agents/AV aubrie blockers given resolving septic shock:   Carvedilol 6.25mg BID  Isosorbide mononitrate 30mg q24h    Metoprolol succinate 50mg q24  Nifedipine 30mg q24 hours   Eventual cardiac cath  Cardiology consulted, appreciate recommendations     New onset a-fib (HCC)  Assessment & Plan  New-onset in AM of . With occasional RVR with associated hypotension. Amiodarone bolus x1 given at RRT while in dialysis  AM.    Plan:  Continue amiodarone infusion  Heparin ACS/low for AF    Acute on chronic diastolic (congestive) heart failure (HCC)  Assessment & Plan  Likely related to NSTEMI from septic shock and element of stress cardiomyopathy, but cannot rule about possible ischemic element given regional wall changes   24 TTE: LV cavity size is normal. Wall thickness is mildly increased. LVEF 40%. Systolic function is moderately reduced. Following segments are hypokinetic: mid anteroseptal, mid inferior, apical anterior, apical septal, apical inferior and apex.RV normal. Mild MR. Mild TR    Plan:   Volume removal with CRRT  Daily weights  Continue holding home afterload reduction agents/AV aubrie blockers given resolving septic shock:   Carvedilol 6.25mg BID  Isosorbide mononitrate 30mg q24h   Metoprolol succinate 50mg q24  Nifedipine 30mg q24 hours   Cardiac cath for ischemic work-up, timing per cardiology  1000mL fluid restriction   Meds as above for NSTEMI  Cardiology consulted, appreciate recommendations      Multifocal pneumonia  Assessment & Plan  Presented 23 to [facility] with new oxygen requirement, fatigue   Relevant imagin/7/24 CT Chest: Bilateral multilevel airspace consolidation, groundglass opacities, septal thickening and small bilateral pleural effusions secondary to volume overloaded as well as pneumonia.  Initial procalcitonin: 3.16 (24)  Possibly falsely elevated due to ESRD, but had lower levels in the past   Cultures:  24 BCXx2: No growth (24 hours)   23 COVID/Flu/RSV: Negative   23 RP2: Negative   24 MRSA Cx: Negative    24 BCXx2: No growth (x 48 hrs)     Plan:   Current  Antibiotics: Cefepime for 5d course  Continue to monitor fever and WBC curve   Follow up cultures      ESRD (end stage renal disease) ([facility])  Assessment & Plan  Dialysis regimen: Tuesday, Thursday, Saturday via right arm AV fistula   1/8/24: Right IJ temporary HD catheter placement   1/8/24: CRRT initiated   1/9/24: Tolerated -100 to -150 cc/hr volume removal   Re-initiation of CRRT 1/11    Plan:   CRRT re-initiated 1/11  Cinacalcet 30mg daily   Nephrology consulted, appreciate recommendations     Septic shock ([facility])  Assessment & Plan  Resolved  Patient presented to the ER 1/7/24 with fatigue and hypoxia, suspected source of bacterial pneumonia   Likely component of adrenal insufficiency given chronic, daily prednisone use   Not given 30ml/kg IVF resuscitation in the setting of anuric ESRD      Plan:  Levophed off since 1/8  Maintain MAP 60-65 given ESRD  Tolerated volume removal with CRRT  Was transitioned back to home Prednisone 5 mg qD   Continue broad spectrum antibiotics pending culture results     Type 1 diabetes mellitus on insulin therapy ([facility])  Assessment & Plan  Lab Results   Component Value Date    HGBA1C 7.7 (H) 01/07/2024    HGBA1C 6.5 (H) 02/23/2018   Anticipate hyperglycemia associated with higher steroid dose    Plan:   Continue AC/HS sliding scale algorithm   Lispro 8 units TID with meals  Lantus 10 units q12h   Adjust insulin regimen as needed to maintain goal -180  Carb controlled diet   If acidosis is persistent, consider checking beta hydroxybutyrate and initiating insulin infusion if elevated    Renal transplant failure and rejection  Assessment & Plan  History of renal transplant chronically on tacrolimus 1 mg p.o. nightly, 2 mg p.o. every morning, AZA 50 daily, Prednisone 5 Daily    Plan:  Continue tacrolimus 2mg qAM, 1mg qPM  Prednisone 5 mg qD  Holding home azathioprine 50mg daily   Nephrology consulted, appreciate recommendations   Reach out to transplant center for update regarding  "these medications    Chronic anemia  Assessment & Plan  Secondary to chronic disease and ESRD  Baseline hemoglobin: 9.6 - 11.8  Initial hemoglobin: 12.0 (1/7/24)  Last hemoglobin: 8.9 (1/9/24)    Plan:   Transfuse as indicated in the setting of hemodynamic instability or signs of active bleeding  CBC in AM    S/P AKA (above knee amputation), right (HCC)  Assessment & Plan  Secondary to chronic osteomyelitis     Plan:   PT/OT when appropriate   Offloading and frequent turns            History of Present Illness     HPI per Kelly CASTELLANOS 01/08: \"Berto Faria is a 53 y.o.  male with past medical history of type 1 diabetes mellitus, failed renal transplant with ESRD on dialysis HFrEF who presents with hypoxemia and generally not feeling well. He was in his usual state of health until Saturday night (2 days ago) when his wife states he was restless and couldn't sleep. Yesterday he states that he generally did not feel well and was fatigued, but had no other focal concern. He has a home pulse ox and noticed that his oxygen levels were in the low 80% range. He does not chronically use oxygen. This caused him to seek medical attention at [facility].     While there is was found to be in septic shock with source suspected to be a pneumonia as demonstrated to imaging. Cultures negative so far. He was also volume overloaded. In the setting of her chronic renal failure, he was transferred there for CVVHD while on vasopressors\"    The morning of 1/11, patient developed new-onset AF initially with rates 110s-120s. When moved to dialysis suite, patient developed AF RVR with associated hypotension. Dialysis had not yet been initiated. iHD postponed at that time. Patient given amiodarone bolus and started on infusion. Throughout the late morning and early afternoon, patient continued to have low BP so was transferred back to ICU for re-initiation of CRRT.     Immediately following transfer back to ICU, patient became " hypotensive and diaphoretic with new ST changes on EKG. Cardiology called to bedside and STAT echo performed which showed worsening LAD territory hypokinesis.     History obtained from chart review and the patient.  Review of Systems   Constitutional:  Positive for chills and fatigue.   All other systems reviewed and are negative.    Disposition: Critical care   Historical Information   Past Medical History:  12/21/2018: Bacteremia  No date: CAD (coronary artery disease)      Comment:  s/p MARC to LCx, pLAD 2/2018  No date: Cardiac arrest (HCC)  No date: Chronic kidney disease  No date: Diabetes mellitus type 1 ([facility])  No date: Dialysis patient ([facility])      Comment:  Access in right chest  No date: GI bleed  No date: Hyperlipidemia  No date: Hypertension  No date: Infection at site of external fixator pin ([facility])  No date: MI (myocardial infarction) ([facility])  No date: Myocardial infarction ([facility])  No date: Pneumonia      Comment:  Last Assessed 26Feb2013  No date: Renal failure  07/21/2007: Renal transplant, status post Past Surgical History:  02/2019: AMPUTATION; Right      Comment:  aboce knee  No date: ANKLE FRACTURE SURGERY  7/31/2017: ANKLE HARDWARE REMOVAL; Right      Comment:  Procedure: REMOVAL HARDWARE ANKLE;  Surgeon: Alvin Leon MD;  Location: [facility];  Service: Orthopedics  8/17/2017: ANKLE HARDWARE REMOVAL; Right      Comment:  Procedure: TIBIA FAILED HARDWARE REMOVAL;  Surgeon:                Alvin Leon MD;  Location: [facility];  Service:                Orthopedics  7/3/2017: CLOSED REDUCTION ANKLE; Right      Comment:  Procedure: CLOSED REDUCTION DISTAL TIB-FIB AND CASTING                VS;  Surgeon: Alvin Leon MD;  Location: [facility];                 Service: Orthopedics  02/2018: CORONARY ANGIOPLASTY WITH STENT PLACEMENT      Comment:  CAD s/p MARC to LCx, pLAD  12/20/2018: ESOPHAGOGASTRODUODENOSCOPY; N/A      Comment:  Procedure: ESOPHAGOGASTRODUODENOSCOPY (EGD) in ICU;                  Surgeon: Galileo Wise IV, MD;  Location: [facility];                 Service: Gastroenterology  No date: EYE SURGERY  No date: FRACTURE SURGERY      Comment:  ORIF Rt Ankle  No date: GLUTAMIC ACID DECARBOXYLASE (HISTORICAL)  4/16/2020: IR AV FISTULAGRAM/GRAFTOGRAM  6/23/2023: IR AV FISTULAGRAM/GRAFTOGRAM  8/20/2018: IR PICC LINE  1/8/2024: IR TEMPORARY DIALYSIS CATHETER CHECK/CHANGE/REPOSITION  1/8/2020: IR TUNNELED DIALYSIS CATHETER CHECK/CHANGE/REPOSITION/  ANGIOPLASTY  10/21/2019: IR TUNNELED DIALYSIS CATHETER PLACEMENT  5/29/2020: IR TUNNELED DIALYSIS CATHETER REMOVAL  10/5/2018: IR VENOUS LINE REMOVAL  02/01/2019: LEG AMPUTATION; Right      Comment:  Above the knee  No date: NEPHRECTOMY TRANSPLANTED ORGAN  2/11/2020: SC ARTERIOVENOUS ANASTOMOSIS OPEN DIRECT; Right      Comment:  Procedure: CREATION FISTULA ARTERIOVENOUS (AV) right                upper extremity;  Surgeon: Carlos Medina MD;  Location:                 MAIN OR;  Service: Vascular  7/3/2017: SC OPEN TREATMENT FRACTURE DISTAL TIBIA FIBULA; Right      Comment:  Procedure: OPEN REDUCTION W/ INTERNAL FIXATION (ORIF);                 Surgeon: Alvin Leon MD;  Location: [facility];                 Service: Orthopedics  10/27/2016: TOE AMPUTATION; Right      Comment:  Procedure: AMPUTATION TOE;  Surgeon: Krishna Ruiz DPM;               Location: [facility];  Service:    Current Outpatient Medications   Medication Instructions    aspirin 81 mg, Oral, Every morning    atorvastatin (LIPITOR) 80 mg, Oral, Daily    azaTHIOprine (IMURAN) 50 mg, Oral, Daily    carvedilol (COREG) 6.25 mg, Oral, 2 times daily with meals    cholecalciferol (VITAMIN D3) 5,000 Units, Oral, Daily    cinacalcet (SENSIPAR) 30 mg, Oral, Daily    Glucagon, rDNA, (Glucagon Emergency) 1 MG KIT INJECT 1MG SUBCUTANEOUSLY ONCE AS NEEDED FOR LOW BLOOD SUGAR FOR UP TO 1 DOSE    glucose blood (Contour Next Test) test strip Use as instructed 6 times daily    insulin aspart (NOVOLOG)  "10 Units, Subcutaneous Insulin Pump, 3 times daily    insulin glargine (SEMGLEE) 10 Units, Subcutaneous, Every 12 hours scheduled    Insulin Syringe-Needle U-100 31G X 15/64\" 0.5 ML MISC Does not apply, 3 times daily    isosorbide mononitrate (IMDUR) 30 mg, Oral, Daily    metoprolol succinate (TOPROL-XL) 50 mg, Oral, Daily    multivitamin (THERAGRAN) TABS 1 tablet, Oral, Daily    NIFEdipine (PROCARDIA XL) 120 mg, Oral, Daily    pantoprazole (PROTONIX) 40 mg, Oral, Daily    predniSONE 5 mg, Oral, Daily    tacrolimus (PROGRAF) 1 mg capsule 2 caps in the morning and 1 cap in the evening    No Known Allergies   Social History     Tobacco Use    Smoking status: Never    Smokeless tobacco: Never   Vaping Use    Vaping status: Never Used   Substance Use Topics    Alcohol use: Not Currently     Alcohol/week: 0.0 standard drinks of alcohol    Drug use: Never    Family History   Problem Relation Age of Onset    Diabetes Brother     Coronary artery disease Mother     No Known Problems Father         Objective                            Vitals I/O      Most Recent Min/Max in 24hrs   Temp 97.9 °F (36.6 °C) Temp  Min: 97.9 °F (36.6 °C)  Max: 98.7 °F (37.1 °C)   Pulse 72 Pulse  Min: 62  Max: 138   Resp 22 Resp  Min: 16  Max: 34   /57 BP  Min: 68/50  Max: 133/60   O2 Sat (!) 88 % SpO2  Min: 84 %  Max: 98 %      Intake/Output Summary (Last 24 hours) at 1/11/2024 1547  Last data filed at 1/11/2024 1501  Gross per 24 hour   Intake 759.37 ml   Output 0 ml   Net 759.37 ml       Diet Renal; Potassium 2 GM; Yes; Fluid Restriction 1800 ML; No    Invasive Monitoring           Physical Exam   Physical Exam  Vitals reviewed.   Skin:     General: Skin is cool.      Capillary Refill: Capillary refill takes 2 to 3 seconds.      Coloration: Skin is pale. Skin is not mottled.   HENT:      Mouth/Throat:      Mouth: Mucous membranes are moist.   Neck:      Vascular: Central line present.   Cardiovascular:      Rate and Rhythm: Tachycardia " present. Rhythm irregular.   Musculoskeletal:      Right lower leg: No edema.      Left lower leg: No edema.   Abdominal:      Palpations: Abdomen is soft.   Constitutional:       General: He is not in acute distress.  Pulmonary:      Effort: Pulmonary effort is normal.   Neurological:      General: No focal deficit present.      GCS: GCS eye subscore is 4. GCS verbal subscore is 5. GCS motor subscore is 6.            Diagnostic Studies      EKG: reviewed  Imaging:  I have personally reviewed pertinent reports.   and I have personally reviewed pertinent films in PACS     Medications:  Scheduled PRN   aspirin, 81 mg, QAM  atorvastatin, 80 mg, Daily With Dinner  calcitriol, 0.5 mcg, Once per day on Tuesday Thursday Saturday  cefepime, 1,000 mg, Q24H  cinacalcet, 30 mg, Daily  insulin glargine, 10 Units, Q12H JAMARCUS  insulin lispro, 1-6 Units, HS  insulin lispro, 1-6 Units, TID With Meals  insulin lispro, 8 Units, TID With Meals  norepinephrine, ,   pantoprazole, 40 mg, Early Morning  predniSONE, 5 mg, Daily  tacrolimus, 1 mg, QPM  tacrolimus, 2 mg, QAM      albuterol, 2 puff, Q4H PRN  glycerin-hypromellose-, 1 drop, Q4H PRN  heparin (porcine), 1,500 Units, Q6H PRN  heparin (porcine), 3,000 Units, Q6H PRN  norepinephrine, ,   ondansetron, 4 mg, Q8H PRN       Continuous    amiodarone (CORDARONE) 900 mg in dextrose 5 % 500 mL infusion, 1 mg/min, Last Rate: Stopped (01/11/24 1430)   Followed by  amiodarone (CORDARONE) 900 mg in dextrose 5 % 500 mL infusion, 0.5 mg/min  heparin (porcine), 3-20 Units/kg/hr (Order-Specific), Last Rate: 12 Units/kg/hr (01/11/24 1407)  norepinephrine, 1-30 mcg/min, Last Rate: 8 mcg/min (01/11/24 1523)  NxStage K 4/Ca 3, 20,000 mL         Labs:    CBC    Recent Labs     01/11/24  0531 01/11/24  1354   WBC 10.99* 12.97*   HGB 9.8* 9.3*   HCT 30.3* 28.3*    237     BMP    Recent Labs     01/11/24  0531 01/11/24  1455   SODIUM 133* 131*   K 5.2 5.0   CL 96 97   CO2 18* 19*   AGAP 19 15    BUN 81* 92*   CREATININE 7.18* 7.72*   CALCIUM 8.7 8.6       Coags    Recent Labs     01/10/24  0736 01/11/24  1354   INR  --  1.17   PTT 93* 45*        Additional Electrolytes  Recent Labs     01/10/24  0336 01/11/24  0531 01/11/24  1455   MG 2.1 3.2* 3.2*   PHOS 3.1 3.4 2.7   CAIONIZED 1.13  --  1.11*          Blood Gas    No recent results  Recent Labs     01/11/24  1423   PHVEN 7.353   [id number] 36.1*   PO2VEN 27.9*   [id number] 19.6*   BEVEN -5.4   [id number] 43.8*    LFTs  No recent results    Infectious  No recent results  Glucose  Recent Labs     01/10/24  0336 01/11/24  0531 01/11/24  1455   GLUC 127 319* 165*             Non-Critical Care Time Statement: I have spent a total time of 20 minutes in caring for this patient including Counseling / Coordination of care, Documenting in the medical record, Reviewing / ordering tests, medicine, procedures  , Obtaining or reviewing history  , and Communicating with other healthcare professionals .   Giacomo Molina PA-C

## 2024-01-11 NOTE — ASSESSMENT & PLAN NOTE
Dialysis regimen: Tuesday, Thursday, Saturday via right arm AV fistula   1/8/24: Right IJ temporary HD catheter placement   1/8/24: CRRT initiated   1/9/24: Tolerated -100 to -150 cc/hr volume removal   Re-initiation of CRRT 1/11    Plan:   CRRT re-initiated 1/11  Cinacalcet 30mg daily   Nephrology consulted, appreciate recommendations

## 2024-01-11 NOTE — ASSESSMENT & PLAN NOTE
History of renal transplant chronically on tacrolimus 1 mg p.o. nightly, 2 mg p.o. every morning, AZA 50 daily, Prednisone 5 Daily    Continue tacrolimus 2mg qAM, 1mg qPM  Prednisone 5 mg qD  Holding home azathioprine 50mg daily   Nephrology is following

## 2024-01-11 NOTE — PROGRESS NOTES
Responded to Rapid response. Medical team providing care for patient. No family present at this time. Support to staff.   01/11/24 1100   Clinical Encounter Type   Visited With Patient not available;Health care provider   Crisis Visit   (Rapid response)   Referral From Nurse   Referral To

## 2024-01-11 NOTE — ASSESSMENT & PLAN NOTE
Secondary to  bacterial pneumonia and volume overload   Wean supplemental oxygen as able to maintain SpO2 > 90%   Continue volume removal with HD  Antibiotic plan detailed above    Continue pulmonary hygiene. Incentive spirometer q1h while awake, encourage coughing and deep breathing. Upright positioning  PT/OT eval

## 2024-01-11 NOTE — PROGRESS NOTES
"Cardiology Progress Note - Berto Faria 53 y.o. male MRN: [id number]    Unit/Bed#: [facility] 530-01 Encounter: [id number]      Assessment:  Septic shock  Acute hypoxic respiratory failure secondary to multifocal pneumonia/volume overload  Acute HFmEF  Type II non-STEMI  Cardiomyopathy, EF 40%  End-stage renal disease on hemodialysis  Renal transplant failure/rejection  Chronic osteomyelitis status post right AKA  Dyslipidemia    Plan:  Cath canceled yesterday by interventional team  Will discharge on DAPT with aspirin and Plavix   Continue high intensity statin therapy  Beta-blocker resumed, blood pressure low last evening - continue as blood pressure allows at current dose   BP will not tolerate further GDMT currently   Volume management with HD  Stable for discharge from cardiac perspective, outpatient follow-up with advanced practitioner scheduled     Subjective:   Patient seen and examined.  No significant events overnight.      Objective:     Vitals: Blood pressure 114/61, pulse 82, temperature 98.2 °F (36.8 °C), resp. rate 18, height 5' 4\" (1.626 m), weight 50.4 kg (111 lb 1.8 oz), SpO2 93%., Body mass index is 19.07 kg/m².,   Orthostatic Blood Pressures      Flowsheet Row Most Recent Value   Blood Pressure 114/61 filed at 01/11/2024 0222   Patient Position - Orthostatic VS Lying filed at 01/10/2024 1400              Intake/Output Summary (Last 24 hours) at 1/11/2024 0717  Last data filed at 1/10/2024 1400  Gross per 24 hour   Intake 393.91 ml   Output --   Net 393.91 ml         Physical Exam:    GEN: Berto Faria appears well, alert and oriented x 3, pleasant and cooperative   HEENT: pupils equal, round, and reactive to light; extraocular muscles intact  NECK: supple, no carotid bruits   HEART: regular rhythm, normal S1 and S2, no murmurs, clicks, gallops or rubs   LUNGS: coarse breath sounds bilaterally   ABDOMEN: normal bowel sounds, soft, no tenderness, no distention  EXTREMITIES: LLE note edema, R AKA "   NEURO: no focal findings   SKIN: normal without suspicious lesions on exposed skin    Medications:      Current Facility-Administered Medications:     albuterol (PROVENTIL HFA,VENTOLIN HFA) inhaler 2 puff, 2 puff, Inhalation, Q4H PRN, GAVINO Melissa    aspirin chewable tablet 81 mg, 81 mg, Oral, QAM, Diandra Spironello V, CRNP, 81 mg at 01/10/24 0836    atorvastatin (LIPITOR) tablet 80 mg, 80 mg, Oral, Daily With Dinner, GAVINO Melissa, 80 mg at 01/10/24 1627    calcitriol (ROCALTROL) capsule 0.5 mcg, 0.5 mcg, Oral, Once per day on Tuesday Thursday Saturday, GAVINO Melissa, 0.5 mcg at 01/09/24 0921    carvedilol (COREG) tablet 3.125 mg, 3.125 mg, Oral, BID With Meals, GAVINO Melissa, 3.125 mg at 01/10/24 1627    cefepime (MAXIPIME) 1,000 mg in dextrose 5 % 50 mL IVPB, 1,000 mg, Intravenous, Q24H, GAVINO Melissa    cinacalcet (SENSIPAR) tablet 30 mg, 30 mg, Oral, Daily, Diandra Ferrero V, CRNP, 30 mg at 01/10/24 0836    heparin (porcine) subcutaneous injection 5,000 Units, 5,000 Units, Subcutaneous, Q8H [facility], GAVINO Melissa, 5,000 Units at 01/11/24 0500    insulin glargine (LANTUS) subcutaneous injection 10 Units 0.1 mL, 10 Units, Subcutaneous, Q12H [facility], Jesica Hay PA-C    insulin lispro (HumaLOG) 100 units/mL subcutaneous injection 1-6 Units, 1-6 Units, Subcutaneous, HS, GAVINO Melissa, 2 Units at 01/08/24 2108    insulin lispro (HumaLOG) 100 units/mL subcutaneous injection 1-6 Units, 1-6 Units, Subcutaneous, TID With Meals **AND** Fingerstick Glucose (POCT), , , TID , Jesica Hay PA-C    insulin lispro (HumaLOG) 100 units/mL subcutaneous injection 8 Units, 8 Units, Subcutaneous, TID With Meals, Diandra Michael V, BERLINNP, 8 Units at 01/10/24 1708    ondansetron (ZOFRAN) injection 4 mg, 4 mg, Intravenous, Q8H PRN, Diandra Michael V, CRNP, 4 mg at 01/08/24 1347    pantoprazole (PROTONIX) EC tablet 40  mg, 40 mg, Oral, Early Morning, Diandra Spironello V, CRNP, 40 mg at 01/11/24 0500    predniSONE tablet 5 mg, 5 mg, Oral, Daily, Diandra Spironello V, CRNP, 5 mg at 01/10/24 0836    tacrolimus (PROGRAF) capsule 1 mg, 1 mg, Oral, QPM, Diandra Spironello V, CRNP, 1 mg at 01/10/24 1752    tacrolimus (PROGRAF) capsule 2 mg, 2 mg, Oral, QAM, Diandra Spironello V, CRNP, 2 mg at 01/10/24 0845     Labs & Results:        Results from last 7 days   Lab Units 01/11/24  0531 01/10/24  0336 01/09/24  0334   WBC Thousand/uL 10.99* 11.39* 13.49*   HEMOGLOBIN g/dL 9.8* 8.9* 8.9*   HEMATOCRIT % 30.3* 27.8* 27.6*   PLATELETS Thousands/uL 194 197 187         Results from last 7 days   Lab Units 01/11/24  0531 01/10/24  0336 01/09/24  1540 01/08/24  0429 01/07/24  1015   POTASSIUM mmol/L 5.2 4.5 4.9   < > 3.5   CHLORIDE mmol/L 96 100 100   < > 96   CO2 mmol/L 18* 25 21   < > 27   BUN mg/dL 81* 51* 43*   < > 30*   CREATININE mg/dL 7.18* 4.92* 3.73*   < > 5.36*   CALCIUM mg/dL 8.7 8.8 9.3   < > 9.3   ALK PHOS U/L  --   --   --   --  98   ALT U/L  --   --   --   --  19   AST U/L  --   --   --   --  27    < > = values in this interval not displayed.     Results from last 7 days   Lab Units 01/10/24  0736 01/10/24  0053 01/09/24  1754 01/07/24  1906 01/07/24  1015   INR   --   --   --   --  1.00   PTT seconds 93* 59* 55*   < > 37    < > = values in this interval not displayed.     Results from last 7 days   Lab Units 01/11/24  0531 01/10/24  0336 01/09/24  1540   MAGNESIUM mg/dL 3.2* 2.1 2.1         Counseling / Coordination of Care  Total floor / unit time spent today 25 minutes.  Greater than 50% of total time was spent with the patient and / or family counseling and / or coordination of care.

## 2024-01-11 NOTE — ASSESSMENT & PLAN NOTE
EF 40% with regional wall motion abnormality  History of CAD with a stents  Continue with above cardiac meds  Cardiology is following

## 2024-01-11 NOTE — PROGRESS NOTES
Patient went into atrial fibrillation with rapid ventricular response while at dialysis.  He did not tolerate the A-fib well and was hypotensive with concerns clinically for now cardiogenic shock.  He was transferred to the ICU and will receive CRRT in the unit.      Patient states that he feels cold and weak.  Denies any actual chest pain or shortness of breath.    Hypotensive 70/40 that responded to Levophed and now at 9 with a blood pressure of 109/59.    Performed a Bedside TTE revealing for an EF of about 30% (visually and by penn) with LAD territory hypokinesis. The patient had slight ST elevation in v1 and AVR with diffuse ST depression concerning for proximal LAD territory stenosis. The patient did not tolerate a fib RVR well but now in sinus after chemically converting with amio.       Plan to treat shock, continue amio gtt at 1 with heparin gtt, and our team will discuss with the interventional team tomorrow about coronary angio again for an evaluation of his coronary anatomy considering his previous history of LAD stenting in 2018 with the findings as stated above.     Richy Guzman MD

## 2024-01-11 NOTE — RAPID RESPONSE
Rapid Response Note  Berto Faria 53 y.o. male MRN: [id number]  Unit/Bed#: [facility] 530-01 Encounter: [id number]    Rapid Response Notification(s):   Response called date/time:  1/11/2024 10:27 AM  Response team arrival date/time:  1/11/2024 10:29 AM  Level of care:  [facility]  Rapid response location:  Other (comment) (dialysis)  Primary reason for rapid response call:  Acute change in BP and acute change in heart rate    Rapid Response Intervention(s):   Airway:  None  Breathing:  None  Circulation:  None  Fluids administered:  None  Medications administered:  Amiodarone       Assessment:   AF RVR with associated hypotension    Plan:   Amio bolus + gtt  No indication for labs or imaging at this juncture  Reassess for ability to tolerate HD this afternoon. If SBP <110, may require CRRT     Rapid Response Outcome:   Transfer:  Remain on floor  Primary service notified of transfer: Yes    Code Status: Level 1 (Full Code)      Family notified of transfer: no  Family member contacted: per primary team     Background/Situation:   Berto Faria is a 53 y.o. male who is admitted for sepsis due to pneumonia. Rapid response called for AF RVR with hypotension while in dialysis. AF was new onset this AM, prior to dialysis initiation.     Review of Systems   Respiratory:  Negative for shortness of breath.    Cardiovascular:  Negative for chest pain.       Objective:   Vitals:    01/11/24 0815 01/11/24 0934 01/11/24 1031 01/11/24 1036   BP: 118/63 121/79 119/84 110/69   Pulse: 85 (!) 131 (!) 126 (!) 123   Resp: 18  (!) 30    Temp: 98.1 °F (36.7 °C)      TempSrc:       SpO2: 96%      Weight:       Height:         Physical Exam  Vitals reviewed.   Constitutional:       General: He is not in acute distress.  Cardiovascular:      Rate and Rhythm: Tachycardia present. Rhythm irregular.   Pulmonary:      Effort: Pulmonary effort is normal. No respiratory distress.         Portions of the record may have been created with voice  "recognition software.  Occasional wrong word or \"sound a like\" substitutions may have occurred due to the inherent limitations of voice recognition software.  Read the chart carefully and recognize, using context, where substitutions have occurred.    Giacomo Molina PA-C      "

## 2024-01-11 NOTE — ASSESSMENT & PLAN NOTE
New-onset in AM of 1/11. With occasional RVR with associated hypotension. Amiodarone bolus x1 given at RRT while in dialysis 1/11 AM.    Plan:  Continue amiodarone infusion  Heparin ACS/low for AF

## 2024-01-12 ENCOUNTER — APPOINTMENT (OUTPATIENT)
Dept: PHYSICAL THERAPY | Facility: CLINIC | Age: 54
End: 2024-01-12
Payer: MEDICARE

## 2024-01-12 LAB
ABO GROUP BLD: NORMAL
ALBUMIN SERPL BCP-MCNC: 3.2 G/DL (ref 3.5–5)
ALP SERPL-CCNC: 57 U/L (ref 34–104)
ALT SERPL W P-5'-P-CCNC: 12 U/L (ref 7–52)
ANION GAP SERPL CALCULATED.3IONS-SCNC: 12 MMOL/L
ANION GAP SERPL CALCULATED.3IONS-SCNC: 13 MMOL/L
ANION GAP SERPL CALCULATED.3IONS-SCNC: 13 MMOL/L
ANION GAP SERPL CALCULATED.3IONS-SCNC: 17 MMOL/L
APTT PPP: 74 SECONDS (ref 23–37)
AST SERPL W P-5'-P-CCNC: 18 U/L (ref 13–39)
ATRIAL RATE: 117 BPM
ATRIAL RATE: 62 BPM
ATRIAL RATE: 70 BPM
BACTERIA BLD CULT: NORMAL
BACTERIA BLD CULT: NORMAL
BASE EX.OXY STD BLDV CALC-SCNC: 45.5 % (ref 60–80)
BASE EX.OXY STD BLDV CALC-SCNC: 48 % (ref 60–80)
BASE EX.OXY STD BLDV CALC-SCNC: 57.5 % (ref 60–80)
BASE EX.OXY STD BLDV CALC-SCNC: 57.6 % (ref 60–80)
BASE EXCESS BLDV CALC-SCNC: -2 MMOL/L
BASE EXCESS BLDV CALC-SCNC: -3.5 MMOL/L
BASE EXCESS BLDV CALC-SCNC: -3.9 MMOL/L
BASE EXCESS BLDV CALC-SCNC: 1.4 MMOL/L
BASOPHILS # BLD AUTO: 0.03 THOUSANDS/ÂΜL (ref 0–0.1)
BASOPHILS NFR BLD AUTO: 0 % (ref 0–1)
BILIRUB DIRECT SERPL-MCNC: 0.19 MG/DL (ref 0–0.2)
BILIRUB SERPL-MCNC: 0.78 MG/DL (ref 0.2–1)
BLD GP AB SCN SERPL QL: NEGATIVE
BUN SERPL-MCNC: 37 MG/DL (ref 5–25)
BUN SERPL-MCNC: 40 MG/DL (ref 5–25)
BUN SERPL-MCNC: 45 MG/DL (ref 5–25)
BUN SERPL-MCNC: 56 MG/DL (ref 5–25)
CA-I BLD-SCNC: 1.06 MMOL/L (ref 1.12–1.32)
CA-I BLD-SCNC: 1.07 MMOL/L (ref 1.12–1.32)
CA-I BLD-SCNC: 1.13 MMOL/L (ref 1.12–1.32)
CA-I BLD-SCNC: 1.13 MMOL/L (ref 1.12–1.32)
CA-I BLD-SCNC: 1.17 MMOL/L (ref 1.12–1.32)
CALCIUM SERPL-MCNC: 8.2 MG/DL (ref 8.4–10.2)
CALCIUM SERPL-MCNC: 8.2 MG/DL (ref 8.4–10.2)
CALCIUM SERPL-MCNC: 8.3 MG/DL (ref 8.4–10.2)
CALCIUM SERPL-MCNC: 8.5 MG/DL (ref 8.4–10.2)
CHLORIDE SERPL-SCNC: 100 MMOL/L (ref 96–108)
CHLORIDE SERPL-SCNC: 101 MMOL/L (ref 96–108)
CO2 SERPL-SCNC: 16 MMOL/L (ref 21–32)
CO2 SERPL-SCNC: 20 MMOL/L (ref 21–32)
CO2 SERPL-SCNC: 22 MMOL/L (ref 21–32)
CO2 SERPL-SCNC: 22 MMOL/L (ref 21–32)
CREAT SERPL-MCNC: 2.87 MG/DL (ref 0.6–1.3)
CREAT SERPL-MCNC: 3.3 MG/DL (ref 0.6–1.3)
CREAT SERPL-MCNC: 3.61 MG/DL (ref 0.6–1.3)
CREAT SERPL-MCNC: 4.67 MG/DL (ref 0.6–1.3)
EOSINOPHIL # BLD AUTO: 0.16 THOUSAND/ÂΜL (ref 0–0.61)
EOSINOPHIL NFR BLD AUTO: 2 % (ref 0–6)
ERYTHROCYTE [DISTWIDTH] IN BLOOD BY AUTOMATED COUNT: 16.3 % (ref 11.6–15.1)
GFR SERPL CREATININE-BSD FRML MDRD: 13 ML/MIN/1.73SQ M
GFR SERPL CREATININE-BSD FRML MDRD: 17 ML/MIN/1.73SQ M
GFR SERPL CREATININE-BSD FRML MDRD: 20 ML/MIN/1.73SQ M
GFR SERPL CREATININE-BSD FRML MDRD: 23 ML/MIN/1.73SQ M
GLUCOSE SERPL-MCNC: 117 MG/DL (ref 65–140)
GLUCOSE SERPL-MCNC: 117 MG/DL (ref 65–140)
GLUCOSE SERPL-MCNC: 121 MG/DL (ref 65–140)
GLUCOSE SERPL-MCNC: 151 MG/DL (ref 65–140)
GLUCOSE SERPL-MCNC: 170 MG/DL (ref 65–140)
GLUCOSE SERPL-MCNC: 190 MG/DL (ref 65–140)
GLUCOSE SERPL-MCNC: 201 MG/DL (ref 65–140)
GLUCOSE SERPL-MCNC: 207 MG/DL (ref 65–140)
GLUCOSE SERPL-MCNC: 226 MG/DL (ref 65–140)
HCO3 BLDV-SCNC: 20.5 MMOL/L (ref 24–30)
HCO3 BLDV-SCNC: 20.7 MMOL/L (ref 24–30)
HCO3 BLDV-SCNC: 22.8 MMOL/L (ref 24–30)
HCO3 BLDV-SCNC: 25.6 MMOL/L (ref 24–30)
HCT VFR BLD AUTO: 22.9 % (ref 36.5–49.3)
HGB BLD-MCNC: 7.8 G/DL (ref 12–17)
HGB BLD-MCNC: 7.8 G/DL (ref 12–17)
IMM GRANULOCYTES # BLD AUTO: 0.07 THOUSAND/UL (ref 0–0.2)
IMM GRANULOCYTES NFR BLD AUTO: 1 % (ref 0–2)
INR PPP: 1.23 (ref 0.84–1.19)
LYMPHOCYTES # BLD AUTO: 2.62 THOUSANDS/ÂΜL (ref 0.6–4.47)
LYMPHOCYTES NFR BLD AUTO: 26 % (ref 14–44)
MAGNESIUM SERPL-MCNC: 1.9 MG/DL (ref 1.9–2.7)
MAGNESIUM SERPL-MCNC: 2 MG/DL (ref 1.9–2.7)
MAGNESIUM SERPL-MCNC: 2.1 MG/DL (ref 1.9–2.7)
MAGNESIUM SERPL-MCNC: 2.2 MG/DL (ref 1.9–2.7)
MAGNESIUM SERPL-MCNC: 2.2 MG/DL (ref 1.9–2.7)
MCH RBC QN AUTO: 33.8 PG (ref 26.8–34.3)
MCHC RBC AUTO-ENTMCNC: 34.1 G/DL (ref 31.4–37.4)
MCV RBC AUTO: 99 FL (ref 82–98)
MONOCYTES # BLD AUTO: 1.02 THOUSAND/ÂΜL (ref 0.17–1.22)
MONOCYTES NFR BLD AUTO: 10 % (ref 4–12)
NASAL CANNULA: 2
NASAL CANNULA: 2
NEUTROPHILS # BLD AUTO: 6.19 THOUSANDS/ÂΜL (ref 1.85–7.62)
NEUTS SEG NFR BLD AUTO: 61 % (ref 43–75)
NRBC BLD AUTO-RTO: 0 /100 WBCS
O2 CT BLDV-SCNC: 5.5 ML/DL
O2 CT BLDV-SCNC: 5.6 ML/DL
O2 CT BLDV-SCNC: 6.8 ML/DL
O2 CT BLDV-SCNC: 7 ML/DL
P AXIS: 67 DEGREES
P AXIS: 76 DEGREES
PCO2 BLDV: 33 MM HG (ref 42–50)
PCO2 BLDV: 35.4 MM HG (ref 42–50)
PCO2 BLDV: 38.7 MM HG (ref 42–50)
PCO2 BLDV: 39.2 MM HG (ref 42–50)
PH BLDV: 7.38 [PH] (ref 7.3–7.4)
PH BLDV: 7.38 [PH] (ref 7.3–7.4)
PH BLDV: 7.41 [PH] (ref 7.3–7.4)
PH BLDV: 7.44 [PH] (ref 7.3–7.4)
PHOSPHATE SERPL-MCNC: 2.5 MG/DL (ref 2.7–4.5)
PHOSPHATE SERPL-MCNC: 2.6 MG/DL (ref 2.7–4.5)
PHOSPHATE SERPL-MCNC: 2.8 MG/DL (ref 2.7–4.5)
PHOSPHATE SERPL-MCNC: 2.8 MG/DL (ref 2.7–4.5)
PHOSPHATE SERPL-MCNC: 3.1 MG/DL (ref 2.7–4.5)
PLATELET # BLD AUTO: 198 THOUSANDS/UL (ref 149–390)
PMV BLD AUTO: 8.8 FL (ref 8.9–12.7)
PO2 BLDV: 26.3 MM HG (ref 35–45)
PO2 BLDV: 27.8 MM HG (ref 35–45)
PO2 BLDV: 31.8 MM HG (ref 35–45)
PO2 BLDV: 33 MM HG (ref 35–45)
POTASSIUM SERPL-SCNC: 4.3 MMOL/L (ref 3.5–5.3)
POTASSIUM SERPL-SCNC: 4.4 MMOL/L (ref 3.5–5.3)
POTASSIUM SERPL-SCNC: 4.8 MMOL/L (ref 3.5–5.3)
POTASSIUM SERPL-SCNC: 5 MMOL/L (ref 3.5–5.3)
PR INTERVAL: 150 MS
PR INTERVAL: 152 MS
PROT SERPL-MCNC: 5.5 G/DL (ref 6.4–8.4)
PROTHROMBIN TIME: 15.3 SECONDS (ref 11.6–14.5)
QRS AXIS: -20 DEGREES
QRS AXIS: 36 DEGREES
QRS AXIS: 60 DEGREES
QRSD INTERVAL: 108 MS
QRSD INTERVAL: 114 MS
QRSD INTERVAL: 116 MS
QT INTERVAL: 346 MS
QT INTERVAL: 464 MS
QT INTERVAL: 472 MS
QTC INTERVAL: 470 MS
QTC INTERVAL: 509 MS
QTC INTERVAL: 535 MS
RBC # BLD AUTO: 2.31 MILLION/UL (ref 3.88–5.62)
RH BLD: POSITIVE
SODIUM SERPL-SCNC: 133 MMOL/L (ref 135–147)
SODIUM SERPL-SCNC: 134 MMOL/L (ref 135–147)
SODIUM SERPL-SCNC: 135 MMOL/L (ref 135–147)
SODIUM SERPL-SCNC: 136 MMOL/L (ref 135–147)
SPECIMEN EXPIRATION DATE: NORMAL
T WAVE AXIS: 153 DEGREES
T WAVE AXIS: 156 DEGREES
T WAVE AXIS: 246 DEGREES
TSH SERPL DL<=0.05 MIU/L-ACNC: 0.74 UIU/ML (ref 0.45–4.5)
VENTRICULAR RATE: 144 BPM
VENTRICULAR RATE: 62 BPM
VENTRICULAR RATE: 70 BPM
WBC # BLD AUTO: 10.09 THOUSAND/UL (ref 4.31–10.16)

## 2024-01-12 PROCEDURE — 97167 OT EVAL HIGH COMPLEX 60 MIN: CPT

## 2024-01-12 PROCEDURE — 82330 ASSAY OF CALCIUM: CPT | Performed by: INTERNAL MEDICINE

## 2024-01-12 PROCEDURE — 99232 SBSQ HOSP IP/OBS MODERATE 35: CPT | Performed by: INTERNAL MEDICINE

## 2024-01-12 PROCEDURE — 94760 N-INVAS EAR/PLS OXIMETRY 1: CPT

## 2024-01-12 PROCEDURE — 86901 BLOOD TYPING SEROLOGIC RH(D): CPT | Performed by: NURSE PRACTITIONER

## 2024-01-12 PROCEDURE — 82805 BLOOD GASES W/O2 SATURATION: CPT

## 2024-01-12 PROCEDURE — 99152 MOD SED SAME PHYS/QHP 5/>YRS: CPT | Performed by: INTERNAL MEDICINE

## 2024-01-12 PROCEDURE — 83735 ASSAY OF MAGNESIUM: CPT | Performed by: INTERNAL MEDICINE

## 2024-01-12 PROCEDURE — 86920 COMPATIBILITY TEST SPIN: CPT

## 2024-01-12 PROCEDURE — 93010 ELECTROCARDIOGRAM REPORT: CPT | Performed by: INTERNAL MEDICINE

## 2024-01-12 PROCEDURE — 99291 CRITICAL CARE FIRST HOUR: CPT

## 2024-01-12 PROCEDURE — 5A1D90Z PERFORMANCE OF URINARY FILTRATION, CONTINUOUS, GREATER THAN 18 HOURS PER DAY: ICD-10-PCS | Performed by: INTERNAL MEDICINE

## 2024-01-12 PROCEDURE — 84443 ASSAY THYROID STIM HORMONE: CPT | Performed by: NURSE PRACTITIONER

## 2024-01-12 PROCEDURE — 82330 ASSAY OF CALCIUM: CPT | Performed by: NURSE PRACTITIONER

## 2024-01-12 PROCEDURE — 85018 HEMOGLOBIN: CPT | Performed by: NURSE PRACTITIONER

## 2024-01-12 PROCEDURE — 84100 ASSAY OF PHOSPHORUS: CPT | Performed by: NURSE PRACTITIONER

## 2024-01-12 PROCEDURE — 84100 ASSAY OF PHOSPHORUS: CPT | Performed by: INTERNAL MEDICINE

## 2024-01-12 PROCEDURE — 99153 MOD SED SAME PHYS/QHP EA: CPT | Performed by: INTERNAL MEDICINE

## 2024-01-12 PROCEDURE — 80048 BASIC METABOLIC PNL TOTAL CA: CPT

## 2024-01-12 PROCEDURE — 84100 ASSAY OF PHOSPHORUS: CPT

## 2024-01-12 PROCEDURE — B2111ZZ FLUOROSCOPY OF MULTIPLE CORONARY ARTERIES USING LOW OSMOLAR CONTRAST: ICD-10-PCS | Performed by: INTERNAL MEDICINE

## 2024-01-12 PROCEDURE — 85025 COMPLETE CBC W/AUTO DIFF WBC: CPT | Performed by: NURSE PRACTITIONER

## 2024-01-12 PROCEDURE — C1760 CLOSURE DEV, VASC: HCPCS | Performed by: INTERNAL MEDICINE

## 2024-01-12 PROCEDURE — 93454 CORONARY ARTERY ANGIO S&I: CPT | Performed by: INTERNAL MEDICINE

## 2024-01-12 PROCEDURE — 82948 REAGENT STRIP/BLOOD GLUCOSE: CPT

## 2024-01-12 PROCEDURE — 80048 BASIC METABOLIC PNL TOTAL CA: CPT | Performed by: INTERNAL MEDICINE

## 2024-01-12 PROCEDURE — NC001 PR NO CHARGE: Performed by: INTERNAL MEDICINE

## 2024-01-12 PROCEDURE — C1769 GUIDE WIRE: HCPCS | Performed by: INTERNAL MEDICINE

## 2024-01-12 PROCEDURE — 97163 PT EVAL HIGH COMPLEX 45 MIN: CPT

## 2024-01-12 PROCEDURE — 93458 L HRT ARTERY/VENTRICLE ANGIO: CPT | Performed by: INTERNAL MEDICINE

## 2024-01-12 PROCEDURE — 99223 1ST HOSP IP/OBS HIGH 75: CPT | Performed by: THORACIC SURGERY (CARDIOTHORACIC VASCULAR SURGERY)

## 2024-01-12 PROCEDURE — 86850 RBC ANTIBODY SCREEN: CPT | Performed by: NURSE PRACTITIONER

## 2024-01-12 PROCEDURE — C1894 INTRO/SHEATH, NON-LASER: HCPCS | Performed by: INTERNAL MEDICINE

## 2024-01-12 PROCEDURE — 90945 DIALYSIS ONE EVALUATION: CPT | Performed by: INTERNAL MEDICINE

## 2024-01-12 PROCEDURE — 85730 THROMBOPLASTIN TIME PARTIAL: CPT | Performed by: INTERNAL MEDICINE

## 2024-01-12 PROCEDURE — 4A023N7 MEASUREMENT OF CARDIAC SAMPLING AND PRESSURE, LEFT HEART, PERCUTANEOUS APPROACH: ICD-10-PCS | Performed by: INTERNAL MEDICINE

## 2024-01-12 PROCEDURE — 80076 HEPATIC FUNCTION PANEL: CPT | Performed by: NURSE PRACTITIONER

## 2024-01-12 PROCEDURE — 90945 DIALYSIS ONE EVALUATION: CPT

## 2024-01-12 PROCEDURE — 82330 ASSAY OF CALCIUM: CPT

## 2024-01-12 PROCEDURE — 83735 ASSAY OF MAGNESIUM: CPT | Performed by: NURSE PRACTITIONER

## 2024-01-12 PROCEDURE — 86900 BLOOD TYPING SEROLOGIC ABO: CPT | Performed by: NURSE PRACTITIONER

## 2024-01-12 PROCEDURE — B2151ZZ FLUOROSCOPY OF LEFT HEART USING LOW OSMOLAR CONTRAST: ICD-10-PCS | Performed by: INTERNAL MEDICINE

## 2024-01-12 PROCEDURE — 83735 ASSAY OF MAGNESIUM: CPT

## 2024-01-12 PROCEDURE — 85610 PROTHROMBIN TIME: CPT

## 2024-01-12 PROCEDURE — 82805 BLOOD GASES W/O2 SATURATION: CPT | Performed by: INTERNAL MEDICINE

## 2024-01-12 RX ORDER — AMIODARONE HYDROCHLORIDE 200 MG/1
200 TABLET ORAL
Status: DISCONTINUED | OUTPATIENT
Start: 2024-01-12 | End: 2024-01-20

## 2024-01-12 RX ORDER — LIDOCAINE HYDROCHLORIDE 10 MG/ML
INJECTION, SOLUTION EPIDURAL; INFILTRATION; INTRACAUDAL; PERINEURAL CODE/TRAUMA/SEDATION MEDICATION
Status: DISCONTINUED | OUTPATIENT
Start: 2024-01-12 | End: 2024-01-12 | Stop reason: HOSPADM

## 2024-01-12 RX ORDER — FENTANYL CITRATE 50 UG/ML
INJECTION, SOLUTION INTRAMUSCULAR; INTRAVENOUS CODE/TRAUMA/SEDATION MEDICATION
Status: DISCONTINUED | OUTPATIENT
Start: 2024-01-12 | End: 2024-01-12 | Stop reason: HOSPADM

## 2024-01-12 RX ORDER — CALCIUM GLUCONATE 20 MG/ML
1 INJECTION, SOLUTION INTRAVENOUS ONCE
Status: COMPLETED | OUTPATIENT
Start: 2024-01-12 | End: 2024-01-12

## 2024-01-12 RX ORDER — MAGNESIUM SULFATE 1 G/100ML
1 INJECTION INTRAVENOUS ONCE
Qty: 100 ML | Refills: 0 | Status: COMPLETED | OUTPATIENT
Start: 2024-01-12 | End: 2024-01-12

## 2024-01-12 RX ORDER — CALCIUM GLUCONATE 20 MG/ML
2 INJECTION, SOLUTION INTRAVENOUS ONCE
Status: COMPLETED | OUTPATIENT
Start: 2024-01-12 | End: 2024-01-12

## 2024-01-12 RX ADMIN — CEFEPIME 1000 MG: 1 INJECTION, POWDER, FOR SOLUTION INTRAMUSCULAR; INTRAVENOUS at 22:06

## 2024-01-12 RX ADMIN — CINACALCET 30 MG: 30 TABLET, FILM COATED ORAL at 08:55

## 2024-01-12 RX ADMIN — Medication 20000 ML: at 16:33

## 2024-01-12 RX ADMIN — AMIODARONE HYDROCHLORIDE 0.5 MG/MIN: 50 INJECTION, SOLUTION INTRAVENOUS at 12:47

## 2024-01-12 RX ADMIN — INSULIN LISPRO 2 UNITS: 100 INJECTION, SOLUTION INTRAVENOUS; SUBCUTANEOUS at 18:11

## 2024-01-12 RX ADMIN — CEFEPIME 1000 MG: 1 INJECTION, POWDER, FOR SOLUTION INTRAMUSCULAR; INTRAVENOUS at 11:32

## 2024-01-12 RX ADMIN — MAGNESIUM SULFATE HEPTAHYDRATE 1 G: 1 INJECTION, SOLUTION INTRAVENOUS at 10:35

## 2024-01-12 RX ADMIN — Medication 20000 ML: at 01:09

## 2024-01-12 RX ADMIN — SODIUM PHOSPHATE, MONOBASIC, MONOHYDRATE AND SODIUM PHOSPHATE, DIBASIC, ANHYDROUS 6 MMOL: 142; 276 INJECTION, SOLUTION INTRAVENOUS at 03:19

## 2024-01-12 RX ADMIN — CALCIUM GLUCONATE 2 G: 20 INJECTION, SOLUTION INTRAVENOUS at 03:01

## 2024-01-12 RX ADMIN — TACROLIMUS 1 MG: 1 CAPSULE ORAL at 18:59

## 2024-01-12 RX ADMIN — Medication 20000 ML: at 12:49

## 2024-01-12 RX ADMIN — CALCIUM GLUCONATE 1 G: 20 INJECTION, SOLUTION INTRAVENOUS at 21:15

## 2024-01-12 RX ADMIN — PANTOPRAZOLE SODIUM 40 MG: 40 TABLET, DELAYED RELEASE ORAL at 05:05

## 2024-01-12 RX ADMIN — TACROLIMUS 2 MG: 1 CAPSULE ORAL at 08:56

## 2024-01-12 RX ADMIN — ATORVASTATIN CALCIUM 80 MG: 80 TABLET, FILM COATED ORAL at 17:00

## 2024-01-12 RX ADMIN — INSULIN GLARGINE 10 UNITS: 100 INJECTION, SOLUTION SUBCUTANEOUS at 15:15

## 2024-01-12 RX ADMIN — MILRINONE LACTATE IN DEXTROSE 0.25 MCG/KG/MIN: 200 INJECTION, SOLUTION INTRAVENOUS at 12:48

## 2024-01-12 RX ADMIN — PREDNISONE 5 MG: 5 TABLET ORAL at 08:55

## 2024-01-12 RX ADMIN — ASPIRIN 81 MG CHEWABLE TABLET 81 MG: 81 TABLET CHEWABLE at 08:55

## 2024-01-12 RX ADMIN — CALCIUM GLUCONATE 2 G: 20 INJECTION, SOLUTION INTRAVENOUS at 09:38

## 2024-01-12 RX ADMIN — CALCIUM GLUCONATE 1 G: 20 INJECTION, SOLUTION INTRAVENOUS at 16:31

## 2024-01-12 RX ADMIN — AMIODARONE HYDROCHLORIDE 200 MG: 200 TABLET ORAL at 15:14

## 2024-01-12 NOTE — PLAN OF CARE
Problem: OCCUPATIONAL THERAPY ADULT  Goal: Performs self-care activities at highest level of function for planned discharge setting.  See evaluation for individualized goals.  Description: Treatment Interventions: ADL retraining, Functional transfer training, Endurance training, Patient/family training, Equipment evaluation/education, Compensatory technique education, Energy conservation, Activityengagement          See flowsheet documentation for full assessment, interventions and recommendations.   Outcome: Progressing  Note: Limitation: Decreased ADL status, Decreased endurance, Decreased self-care trans, Decreased high-level ADLs  Prognosis: Good  Assessment: Pt is a 53 y.o. male admitted 1/8/24 as a transfer from [facility] w septic shock and respiratory failure, 2' PNA. Pt w active OT eval and treat orders at this time.  PMH includes  has a past medical history of Bacteremia, CAD (coronary artery disease), Cardiac arrest ([facility]), Chronic kidney disease, Diabetes mellitus type 1 ([facility]), Dialysis patient ([facility]), GI bleed, Hyperlipidemia, Hypertension, Infection at site of external fixator pin ([facility]), MI (myocardial infarction) ([facility]), Myocardial infarction ([facility]), Pneumonia, Renal failure, and Renal transplant, status post. Pt lives w spouse in a split level home with 0 viki, 13 steps up to bed/bath which is walk in shower with shower chair and standard toilet. Pta, pt was independent w/ ADL/IADL and used rw for functional mobility, was driving. Currently, pt is Supervision for UB ADL, Min Ax1 for LB ADL, performed bed mob w min Ax1, no further transfers this date 2' medical status. Pt is limited at this time 2* decreased endurance/activtiy tolerance, decreased ADL/High-level ADL status, decreased self-care trans, decreased safety awareness, and is a fall risk. This impacts pt's ability to complete UB and LB dressing and bathing, toileting, transfers, functional mobility, community mobility, home and health maintenance, and  safe engagement in typical daily routine. The patient's raw score on the AM-PAC Daily Activity inpatient short form is 21, standardized score is 44.27, greater than 39.4. Patients at this level are likely to benefit from discharge to home. Please refer to the recommendation of the Occupational Therapist for safe discharge planning. From OT standpoint, pt should D/C to home  when medically stable. Pt will benefit from continued acute OT services 2-3 x/wk for 10-14 days to meet goals.     Rehab Resource Intensity Level, OT: No post-acute rehabilitation needs

## 2024-01-12 NOTE — QUICK NOTE
I was asked to see this patient given worsening EKG changes and cardiogenic shock.    This is 53-year-old male with past medical history of ESRD with failed renal transplant now on immunosuppressive medications, known CAD with PCI to proximal LAD and Lcx (2018), right AKA in setting of infection in 2018, history of significant GI bleed in setting of ulcers in 2019, hypertension, hyperlipidemia, T1DM since childhood.  Back in 2018, patient was admitted for elective orthopedic procedure at outside hospital when he had flash pulmonary edema with ischemic EKG changes.  Patient was discharged with plan to have outpatient ischemic workup.  He ended up again in the hospital with PEA arrest and new onset cardiomyopathy drop in EF. He underwent PCI of LAD and Lcx.at that time in 2018.  He has been following with cardiology outpatient since then.    On this admission, patient initially presented with septic shock in setting of multifocal pneumonia.  He was found to have ischemic EKG changes with elevated troponin at 11k.  He did not had any chest pain during this time.  Patient had echo done on 1/8/2024 which showed EF of 40% with significant LAD wall motion abnormality.  Patient was scheduled for cardiac cath on 1/10/2024 although he was eventually turned down given significant comorbidities and presentation more consistent with type II MI in setting of septic shock.  Again patient was asymptomatic with no chest pain at this point.    On the morning of 1/11/2024, patient had rapid response in setting of atrial fibrillation with RVR during his dialysis session.  He was started on Levophed.  He was eventually moved to CCU.  Concern was raised for cardiogenic shock  and was started on milrinone.  His repeat echo showed drop in EF to 32% with similar wall motion abnormality compared to echo from 1/8/24.  His ischemic changes on EKG were worse today for which cardiology was reached out in the evening.    On my evaluation, patient  is alert and oriented x 3.  He is currently on nasal cannula and denies any chest pain or shortness of breath.  CVVH is running.  He is also on amiodarone and heparin drip for his A-fib  which now has been converted to normal sinus rhythm at the rate of 70s. He is maintaining his maps in 70s on Levophed of 5.     I had discussion with on-call interventional cardiologist.  After extensive discussion of this case, decision was made to continue with medical therapy at this time.  Will make patient n.p.o. overnight for potential cardiac cath tomorrow.

## 2024-01-12 NOTE — PROGRESS NOTES
[facility]  Progress Note  Name: Berto Faria I  MRN: [id number]  Unit/Bed#: [facility] 515-01 I Date of Admission: 1/8/2024   Date of Service: 1/12/2024 I Hospital Day: 4    Assessment/Plan   * Cardiogenic shock (HCC)  Assessment & Plan  As evidenced by low ScvO2, cool extremities, and newly depressed/worsening EF on STAT echo 01/11.     01/11 Echo:    Left Ventricle: Left ventricular cavity size is mildly dilated. Wall thickness is normal. There is eccentric hypertrophy. The left ventricular ejection fraction is 32%. Systolic function is severely reduced. There is severe global hypokinesis with specific regional wall abnormalities in the anteroseptal, inferior and apical regions. Diastolic function is abnormal.    Left Atrium: The atrium is moderately dilated.    Mitral Valve: There is mild to moderate regurgitation.    Tricuspid Valve: There is mild to moderate regurgitation. The right ventricular systolic pressure is moderately elevated. The estimated right ventricular systolic pressure is 57.00 mmHg.    Plan:  Trend ScvO2  Continue milrinone and titrate based on clinical exam and ScvO2  Monitor endpoints of resuscitation  Continuous cardiopulmonary monitoring  Cardiac cath plan per interventional cardiology  Continue CRRT for volume management    Acute respiratory failure with hypoxia (HCC)  Assessment & Plan  Secondary to likely bacterial pneumonia and element of volume overload   1/7/24 CT Chest: Bilateral multilevel airspace consolidation, groundglass opacities, septal thickening and small bilateral pleural effusions secondary to volume overloaded as well as pneumonia.      Plan:   Wean supplemental oxygen as able to maintain SpO2 > 90%   Continue volume removal with CRRT  Antibiotic plan detailed above    Continue pulmonary hygiene. Incentive spirometer q1h while awake, encourage coughing and deep breathing. Upright positioning  Suction as needed and closely monitor  secretions. Maintain HOB >30 degrees. Q4h oral care with chlorhexidine BID      NSTEMI (non-ST elevated myocardial infarction) (HCC)  Assessment & Plan  Presented to [facility] ED with shortness of breath. Troponin and EKG checked as part of initial workup. EKG without acute ischemic changes   NSTEMI likely related to increased demand from acute hypoxic respiratory failure and septic shock   Troponin 86345 > 27985 > 16824     Plan:  ASA 81mg daily   Atorvastatin 80mg qHS   Continue holding home afterload reduction agents/AV aubrie blockers given resolving septic shock:   Carvedilol 6.25mg BID  Isosorbide mononitrate 30mg q24h   Metoprolol succinate 50mg q24  Nifedipine 30mg q24 hours   Eventual cardiac cath  Cardiology consulted, appreciate recommendations     New onset a-fib (HCC)  Assessment & Plan  New-onset in AM of 1/11. With occasional RVR with associated hypotension. Amiodarone bolus x1 given at RRT while in dialysis 1/11 AM.    Plan:  Continue amiodarone infusion  Heparin ACS/low for AF    Acute on chronic diastolic (congestive) heart failure (HCC)  Assessment & Plan  Likely related to NSTEMI from septic shock and element of stress cardiomyopathy, but cannot rule about possible ischemic element given regional wall changes   1/7/24 TTE: LV cavity size is normal. Wall thickness is mildly increased. LVEF 40%. Systolic function is moderately reduced. Following segments are hypokinetic: mid anteroseptal, mid inferior, apical anterior, apical septal, apical inferior and apex.RV normal. Mild MR. Mild TR    Plan:   Volume removal with CRRT  Daily weights  Continue holding home afterload reduction agents/AV aubrie blockers given resolving septic shock:   Carvedilol 6.25mg BID  Isosorbide mononitrate 30mg q24h   Metoprolol succinate 50mg q24  Nifedipine 30mg q24 hours   Cardiac cath for ischemic work-up, timing per cardiology  1000mL fluid restriction   Meds as above for NSTEMI  Cardiology consulted, appreciate  recommendations      Multifocal pneumonia  Assessment & Plan  Presented 23 to [facility] with new oxygen requirement, fatigue   Relevant imagin/7/24 CT Chest: Bilateral multilevel airspace consolidation, groundglass opacities, septal thickening and small bilateral pleural effusions secondary to volume overloaded as well as pneumonia.  Initial procalcitonin: 3.16 (24)  Possibly falsely elevated due to ESRD, but had lower levels in the past   Cultures:  24 BCXx2: No growth (24 hours)   23 COVID/Flu/RSV: Negative   23 RP2: Negative   24 MRSA Cx: Negative    24 BCXx2: No growth (x 48 hrs)     Plan:   Current Antibiotics: Cefepime for 5d course  Continue to monitor fever and WBC curve   Follow up cultures      ESRD (end stage renal disease) ([facility])  Assessment & Plan  Dialysis regimen: Tuesday, Thursday, Saturday via right arm AV fistula   24: Right IJ temporary HD catheter placement   24: CRRT initiated   24: Tolerated -100 to -150 cc/hr volume removal   Re-initiation of CRRT     Plan:   CRRT re-initiated  - continue negative as tolerated  Cinacalcet 30mg daily   Nephrology consulted, appreciate recommendations     Septic shock ([facility])  Assessment & Plan  Resolved  Patient presented to the ER 24 with fatigue and hypoxia, suspected source of bacterial pneumonia   Likely component of adrenal insufficiency given chronic, daily prednisone use   Not given 30ml/kg IVF resuscitation in the setting of anuric ESRD      Plan:  Maintain MAP 60-65 given ESRD  Tolerating volume removal with CRRT  Was transitioned back to home Prednisone 5 mg qD   Continue broad spectrum antibiotics pending culture results     Type 1 diabetes mellitus on insulin therapy ([facility])  Assessment & Plan  Lab Results   Component Value Date    HGBA1C 7.7 (H) 2024    HGBA1C 6.5 (H) 2018   Anticipate hyperglycemia associated with higher steroid dose    Plan:   Continue AC/HS sliding scale algorithm    Lispro 8 units TID with meals  Lantus 10 units q12h   Adjust insulin regimen as needed to maintain goal -180  Carb controlled diet   If acidosis is persistent, consider checking beta hydroxybutyrate and initiating insulin infusion if elevated    Renal transplant failure and rejection  Assessment & Plan  History of renal transplant chronically on tacrolimus 1 mg p.o. nightly, 2 mg p.o. every morning, AZA 50 daily, Prednisone 5 Daily    Plan:  Continue tacrolimus 2mg qAM, 1mg qPM  Prednisone 5 mg qD  Holding home azathioprine 50mg daily   Nephrology consulted, appreciate recommendations   Reach out to transplant center for update regarding these medications    Chronic anemia  Assessment & Plan  Secondary to chronic disease and ESRD  Baseline hemoglobin: 9.6 - 11.8    Plan:   Transfuse as indicated in the setting of hemodynamic instability or signs of active bleeding  CBC daily    S/P AKA (above knee amputation), right (HCC)  Assessment & Plan  Secondary to chronic osteomyelitis     Plan:   PT/OT when appropriate   Offloading and frequent turns              Disposition: Critical care    ICU Core Measures     A: Assess, Prevent, and Manage Pain Has pain been assessed? Yes  Need for changes to pain regimen? No   B: Both SAT/SAT  N/A   C: Choice of Sedation RASS Goal: N/A patient not on sedation  Need for changes to sedation or analgesia regimen? No   D: Delirium CAM-ICU: Negative   E: Early Mobility  Plan for early mobility? Yes   F: Family Engagement Plan for family engagement today? Yes       Antibiotic Review: Continue broad spectrum secondary to severity of illness.     Review of Invasive Devices:      Central access plan: Medications requiring central line HD cath in place.  Plan continue CRRT  [place] Plan: Keep arterial line for hemodynamic monitoring and frequent labs    Prophylaxis:  VTE VTE covered by:  heparin (porcine), Intravenous, 12 Units/kg/hr at 01/11/24 1600  heparin (porcine),  Intravenous  heparin (porcine), Intravenous       Stress Ulcer  covered bypantoprazole (PROTONIX) 40 mg tablet [[id number]] (Long-Term Med), pantoprazole (PROTONIX) EC tablet 40 mg [[id number]]         Significant 24hr Events     24hr events: Started on milrinone with improvement in clinical exam and ScvO2. MFNC 15L weaned to 1-2L NC with volume removal on CRRT.      Subjective   Review of Systems   Objective                            Vitals I/O      Most Recent Min/Max in 24hrs   Temp 97.7 °F (36.5 °C) Temp  Min: 96.4 °F (35.8 °C)  Max: 98.2 °F (36.8 °C)   Pulse 85 Pulse  Min: 62  Max: 138   Resp (!) 24 Resp  Min: 16  Max: 36   /62 BP  Min: 68/50  Max: 133/60   O2 Sat 97 % SpO2  Min: 84 %  Max: 100 %      Intake/Output Summary (Last 24 hours) at 1/12/2024 0827  Last data filed at 1/12/2024 0700  Gross per 24 hour   Intake 1772.49 ml   Output 1671 ml   Net 101.49 ml       Diet NPO    Invasive Monitoring   Arterial Line  Tere BP 84/68  Arterial Line BP  Min: 71/43  Max: 141/50   MAP 76 mmHg  Arterial Line MAP (mmHg)  Min: 54 mmHg  Max: 86 mmHg           Physical Exam   Physical Exam  Vitals reviewed.   Eyes:      Pupils: Pupils are equal, round, and reactive to light.   Skin:     General: Skin is warm and dry.      Capillary Refill: Capillary refill takes less than 2 seconds.      Coloration: Skin is pale. Skin is not mottled.   HENT:      Head: Normocephalic.   Neck:      Vascular: No JVD.   Cardiovascular:      Rate and Rhythm: Normal rate and regular rhythm.      Pulses: Normal pulses.      Heart sounds: Normal heart sounds.   Musculoskeletal:      Right lower leg: No edema.      Left lower leg: No edema.   Abdominal: General: There is no distension.      Palpations: Abdomen is soft.      Tenderness: There is no abdominal tenderness.   Constitutional:       General: He is not in acute distress.     Appearance: He is not ill-appearing or toxic-appearing.   Pulmonary:      Effort: Pulmonary effort is normal.  No tachypnea, accessory muscle usage, respiratory distress or accessory muscle usage.      Breath sounds: Normal breath sounds and air entry.   Neurological:      General: No focal deficit present.      Mental Status: He is alert and oriented to person, place, and time.            Diagnostic Studies      EKG: reviewed  Imaging:  I have personally reviewed pertinent reports.   and I have personally reviewed pertinent films in PACS     Medications:  Scheduled PRN   aspirin, 81 mg, QAM  atorvastatin, 80 mg, Daily With Dinner  calcitriol, 0.5 mcg, Once per day on Tuesday Thursday Saturday  cefepime, 1,000 mg, Q24H  cinacalcet, 30 mg, Daily  insulin glargine, 10 Units, Q12H JAMARCUS  insulin lispro, 1-6 Units, HS  insulin lispro, 1-6 Units, TID With Meals  insulin lispro, 8 Units, TID With Meals  pantoprazole, 40 mg, Early Morning  predniSONE, 5 mg, Daily  tacrolimus, 1 mg, QPM  tacrolimus, 2 mg, QAM      albuterol, 2 puff, Q4H PRN  glycerin-hypromellose-, 1 drop, Q4H PRN  heparin (porcine), 1,500 Units, Q6H PRN  heparin (porcine), 3,000 Units, Q6H PRN  ondansetron, 4 mg, Q8H PRN       Continuous    amiodarone (CORDARONE) 900 mg in dextrose 5 % 500 mL infusion, 0.5 mg/min, Last Rate: 0.5 mg/min (01/11/24 1711)  heparin (porcine), 3-20 Units/kg/hr (Order-Specific), Last Rate: 12 Units/kg/hr (01/11/24 1600)  milrinone (Primacor) infusion, 0.25 mcg/kg/min, Last Rate: 0.25 mcg/kg/min (01/11/24 1832)  norepinephrine, 1-30 mcg/min, Last Rate: 2 mcg/min (01/12/24 0606)  NxStage K 4/Ca 3, 20,000 mL         Labs:    CBC    Recent Labs     01/11/24  1354 01/12/24  0431   WBC 12.97* 10.09   HGB 9.3* 7.8*   HCT 28.3* 22.9*    198     BMP    Recent Labs     01/11/24 2035 01/12/24 0232   SODIUM 133* 136   K 4.8 4.4    101   CO2 20* 22   AGAP 13 13   BUN 71* 56*   CREATININE 5.88* 4.67*   CALCIUM 8.1* 8.2*       Coags    Recent Labs     01/11/24  1354 01/11/24 2005 01/12/24 0232   INR 1.17  --  1.23*   PTT 45* 89*  74*        Additional Electrolytes  Recent Labs     01/12/24  0232 01/12/24  0431   MG 2.2 2.1   PHOS 2.5* 2.8   CAIONIZED 1.07* 1.17          Blood Gas    No recent results  Recent Labs     01/12/24  0533   PHVEN 7.438*   KRK3QZX 38.7*   PO2VEN 26.3*   FUG7QDA 25.6   BEVEN 1.4   Q6FFMDQ 45.5*    LFTs  Recent Labs     01/12/24  0431   ALT 12   AST 18   ALKPHOS 57   ALB 3.2*   TBILI 0.78       Infectious  No recent results  Glucose  Recent Labs     01/11/24  0531 01/11/24  1455 01/11/24  2035 01/12/24  0232   GLUC 319* 165* 176* 117               Critical Care Time Statement: Upon my evaluation, this patient had a high probability of imminent or life-threatening deterioration due to cardiogenic shock requiring inotropy and vasopressors, ESRD now requiring CRRT, which required my direct attention, intervention, and personal management.  I spent a total of 48 minutes directly providing critical care services, including interpretation of complex medical databases, evaluating for the presence of life-threatening injuries or illnesses, management of organ system failure(s) , complex medical decision making (to support/prevent further life-threatening deterioration)., interpretation of hemodynamic data, titration of vasoactive medications, and titration of continuous IV medications (drips). This time is exclusive of procedures, teaching, family meetings, and any prior time recorded by providers other than myself.      Giacomo Molina PA-C

## 2024-01-12 NOTE — OCCUPATIONAL THERAPY NOTE
Occupational Therapy Evaluation     Patient Name: Berto Faria  Today's Date: 1/12/2024  Problem List  Principal Problem:    Cardiogenic shock (HCC)  Active Problems:    Type 1 diabetes mellitus on insulin therapy (HCC)    NSTEMI (non-ST elevated myocardial infarction) (HCC)    S/P AKA (above knee amputation), right (HCC)    Acute respiratory failure with hypoxia (HCC)    Chronic anemia    ESRD (end stage renal disease) ([facility])    Multifocal pneumonia    Acute on chronic diastolic (congestive) heart failure (HCC)    Septic shock (HCC)    Renal transplant failure and rejection    New onset a-fib (HCC)    Past Medical History  Past Medical History:   Diagnosis Date    Bacteremia 12/21/2018    CAD (coronary artery disease)     s/p MARC to LCx, pLAD 2/2018    Cardiac arrest (HCC)     Chronic kidney disease     Diabetes mellitus type 1 (HCC)     Dialysis patient ([facility])     Access in right chest    GI bleed     Hyperlipidemia     Hypertension     Infection at site of external fixator pin (HCC)     MI (myocardial infarction) (HCC)     Myocardial infarction (HCC)     Pneumonia     Last Assessed [id number]    Renal failure     Renal transplant, status post 07/21/2007     Past Surgical History  Past Surgical History:   Procedure Laterality Date    AMPUTATION Right 02/2019    aboce knee    ANKLE FRACTURE SURGERY      ANKLE HARDWARE REMOVAL Right 7/31/2017    Procedure: REMOVAL HARDWARE ANKLE;  Surgeon: Alvin Leon MD;  Location: [facility];  Service: Orthopedics    ANKLE HARDWARE REMOVAL Right 8/17/2017    Procedure: TIBIA FAILED HARDWARE REMOVAL;  Surgeon: Alvin Leon MD;  Location: [facility];  Service: Orthopedics    CLOSED REDUCTION ANKLE Right 7/3/2017    Procedure: CLOSED REDUCTION DISTAL TIB-FIB AND CASTING VS;  Surgeon: Alvin Leon MD;  Location: [facility];  Service: Orthopedics    CORONARY ANGIOPLASTY WITH STENT PLACEMENT  02/2018    CAD s/p MARC to LCx, pLAD    ESOPHAGOGASTRODUODENOSCOPY N/A 12/20/2018     Procedure: ESOPHAGOGASTRODUODENOSCOPY (EGD) in ICU;  Surgeon: Galileo Wise IV, MD;  Location: [facility];  Service: Gastroenterology    EYE SURGERY      FRACTURE SURGERY      ORIF Rt Ankle    GLUTAMIC ACID DECARBOXYLASE (HISTORICAL)      IR AV FISTULAGRAM/GRAFTOGRAM  4/16/2020    IR AV FISTULAGRAM/GRAFTOGRAM  6/23/2023    IR PICC LINE  8/20/2018    IR TEMPORARY DIALYSIS CATHETER CHECK/CHANGE/REPOSITION  1/8/2024    IR TUNNELED DIALYSIS CATHETER CHECK/CHANGE/REPOSITION/ANGIOPLASTY  1/8/2020    IR TUNNELED DIALYSIS CATHETER PLACEMENT  10/21/2019    IR TUNNELED DIALYSIS CATHETER REMOVAL  5/29/2020    IR VENOUS LINE REMOVAL  10/5/2018    LEG AMPUTATION Right 02/01/2019    Above the knee    NEPHRECTOMY TRANSPLANTED ORGAN      WY ARTERIOVENOUS ANASTOMOSIS OPEN DIRECT Right 2/11/2020    Procedure: CREATION FISTULA ARTERIOVENOUS (AV) right upper extremity;  Surgeon: Carlos Medina MD;  Location:  MAIN OR;  Service: Vascular    WY OPEN TREATMENT FRACTURE DISTAL TIBIA FIBULA Right 7/3/2017    Procedure: OPEN REDUCTION W/ INTERNAL FIXATION (ORIF);  Surgeon: Alvin Leon MD;  Location: [facility];  Service: Orthopedics    TOE AMPUTATION Right 10/27/2016    Procedure: AMPUTATION TOE;  Surgeon: Krishna Ruiz DPM;  Location: [facility];  Service:            01/12/24 0945   OT Last Visit   OT Visit Date 01/12/24   Note Type   Note type Evaluation   Pain Assessment   Pain Assessment Tool 0-10   Pain Score No Pain   Restrictions/Precautions   Weight Bearing Precautions Per Order No   Other Precautions Multiple lines;Telemetry;Fall Risk;O2  (CVVH, RLE prosthetic)   Home Living   Type of Home House   Home Layout Two level;1/2 bath on main level;Stairs to enter with rails;Bed/bath upstairs  (split level home)   Bathroom Shower/Tub Walk-in shower   Bathroom Toilet Standard   Bathroom Equipment Shower chair   Bathroom Accessibility Accessible   Home Equipment Walker;Cane  (primarily using RW)   Prior Function   Level of [place]  "Independent with ADLs;Independent with functional mobility;Independent with IADLS   Lives With Spouse   Receives Help From Family   IADLs Independent with driving;Independent with medication management;Independent with meal prep   Falls in the last 6 months 0   Vocational Retired   Lifestyle   Autonomy Pta, pt was I W ADL/IADL, used RW for mobility w prosthetic donned. +   Reciprocal Relationships suppportive spouse who can provide A as needed.   Service to Others retired   Intrinsic Gratification keeping as active as possible.   General   Additional Pertinent History pt w hx R AKA in 2019.   Subjective   Subjective \"I am feeling ok\"   ADL   Where Assessed Edge of bed   Eating Assistance 6  Modified independent   Grooming Assistance 6  Modified Independent   UB Bathing Assistance 5  Supervision/Setup   LB Bathing Assistance 4  Minimal Assistance   UB Dressing Assistance 5  Supervision/Setup   LB Dressing Assistance 4  Minimal Assistance   Toileting Assistance  4  Minimal Assistance   Functional Assistance 4  Minimal Assistance   Bed Mobility   Supine to Sit 4  Minimal assistance   Additional items Assist x 1   Sit to Supine 4  Minimal assistance   Additional items Assist x 1   Additional Comments per medical team, pt only cleared to perform bed mob today given current medical status. will f/u for OOB transfers and mobility as clincal course allows.   Balance   Static Sitting Good   Dynamic Sitting Fair +   Activity Tolerance   Activity Tolerance Patient tolerated treatment well   Medical Staff Made Aware DPT 2' pts med complexity, comorbidities and regression from baseline.   Nurse Made Aware ok per RN   RUE Assessment   RUE Assessment WFL   LUE Assessment   LUE Assessment WFL   Hand Function   Gross Motor Coordination Functional   Fine Motor Coordination Functional   Psychosocial   Psychosocial (WDL) WDL   Cognition   Overall Cognitive Status WFL   Arousal/Participation Alert;Responsive   Attention Within " functional limits   Orientation Level Oriented X4   Memory Within functional limits   Following Commands Follows one step commands without difficulty   Comments pt pleasant and cooperative with overall G safety awareness and insight to condition.   Assessment   Limitation Decreased ADL status;Decreased endurance;Decreased self-care trans;Decreased high-level ADLs   Prognosis Good   Assessment Pt is a 53 y.o. male admitted 1/8/24 as a transfer from [facility] w septic shock and respiratory failure, 2' PNA. Pt w active OT eval and treat orders at this time.  PMH includes  has a past medical history of Bacteremia, CAD (coronary artery disease), Cardiac arrest ([facility]), Chronic kidney disease, Diabetes mellitus type 1 ([facility]), Dialysis patient ([facility]), GI bleed, Hyperlipidemia, Hypertension, Infection at site of external fixator pin ([facility]), MI (myocardial infarction) ([facility]), Myocardial infarction ([facility]), Pneumonia, Renal failure, and Renal transplant, status post. Pt lives w spouse in a split level home with 0 viki, 13 steps up to bed/bath which is walk in shower with shower chair and standard toilet. Pta, pt was independent w/ ADL/IADL and used rw for functional mobility, was driving. Currently, pt is Supervision for UB ADL, Min Ax1 for LB ADL, performed bed mob w min Ax1, no further transfers this date 2' medical status. Pt is limited at this time 2* decreased endurance/activtiy tolerance, decreased ADL/High-level ADL status, decreased self-care trans, decreased safety awareness, and is a fall risk. This impacts pt's ability to complete UB and LB dressing and bathing, toileting, transfers, functional mobility, community mobility, home and health maintenance, and safe engagement in typical daily routine. The patient's raw score on the -PAC Daily Activity inpatient short form is 21, standardized score is 44.27, greater than 39.4. Patients at this level are likely to benefit from discharge to home. Please refer to the recommendation of  the Occupational Therapist for safe discharge planning. From OT standpoint, pt should D/C to home  when medically stable. Pt will benefit from continued acute OT services 2-3 x/wk for 10-14 days to meet goals.   Goals   Patient Goals get better   LTG Time Frame 10-14   Plan   Treatment Interventions ADL retraining;Functional transfer training;Endurance training;Patient/family training;Equipment evaluation/education;Compensatory technique education;Energy conservation;Activityengagement   OT Frequency 2-3x/wk   Discharge Recommendation   Rehab Resource Intensity Level, OT No post-acute rehabilitation needs   Additional Comments  anticipate no needs as pt continues to heal and medical status improves.   AM-PAC Daily Activity Inpatient   Lower Body Dressing 3   Bathing 3   Toileting 3   Upper Body Dressing 4   Grooming 4   Eating 4   Daily Activity Raw Score 21   Daily Activity Standardized Score (Calc for Raw Score >=11) 44.27   AM-PAC Applied Cognition Inpatient   Following a Speech/Presentation 4   Understanding Ordinary Conversation 4   Taking Medications 4   Remembering Where Things Are Placed or Put Away 4   Remembering List of 4-5 Errands 4   Taking Care of Complicated Tasks 4   Applied Cognition Raw Score 24   Applied Cognition Standardized Score 62.21   Modified [place] Scale   Modified [place] Scale 4   End of Consult   Education Provided Yes   Patient Position at End of Consult All needs within reach;Supine   Nurse Communication Nurse aware of consult     Pt will complete functional mobility with Mod I using appropriate DME as needed.     Pt will complete UB dressing and bathing with Mod I using appropriate DME as needed.     Pt will complete LB dressing and bathing with Mod I using appropriate DME as needed.    Pt will complete transfers with Mod I using appropriate DME as needed.     Pt will complete toileting with Mod I using appropriate DME as needed.     Pt will utilize energy conservation techniques  throughout functional activity/ADL s/p skilled education.     Pt will demonstrate increased safety awareness during functional tasks/ADL's s/p skilled education.     Pt will increase activity tolerance to 30 minutes in order to complete ADL's/ functional tasks, using appropriate DME as needed.     Pt will engage in ongoing cog assessment w/ G participation to assist with safe d/c planning.      Lorena Barney, DAMION, OTR/L

## 2024-01-12 NOTE — ASSESSMENT & PLAN NOTE
Dialysis regimen: Tuesday, Thursday, Saturday via right arm AV fistula   1/8/24: Right IJ temporary HD catheter placement   1/8/24: CRRT initiated   1/9/24: Tolerated -100 to -150 cc/hr volume removal   Re-initiation of CRRT 1/11    Plan:   CRRT re-initiated 1/11 - continue negative as tolerated  Cinacalcet 30mg daily   Nephrology consulted, appreciate recommendations

## 2024-01-12 NOTE — ASSESSMENT & PLAN NOTE
Secondary to chronic disease and ESRD  Baseline hemoglobin: 9.6 - 11.8    Plan:   Transfuse as indicated in the setting of hemodynamic instability or signs of active bleeding  CBC daily

## 2024-01-12 NOTE — ASSESSMENT & PLAN NOTE
Resolved  Patient presented to the ER 1/7/24 with fatigue and hypoxia, suspected source of bacterial pneumonia   Likely component of adrenal insufficiency given chronic, daily prednisone use   Not given 30ml/kg IVF resuscitation in the setting of anuric ESRD      Plan:  Maintain MAP 60-65 given ESRD  Tolerating volume removal with CRRT  Was transitioned back to home Prednisone 5 mg qD   Continue broad spectrum antibiotics pending culture results

## 2024-01-12 NOTE — PLAN OF CARE
Problem: CARDIOVASCULAR - ADULT  Goal: Maintains optimal cardiac output and hemodynamic stability  Description: INTERVENTIONS:  - Monitor I/O, vital signs and rhythm  - Monitor for S/S and trends of decreased cardiac output  - Administer and titrate ordered vasoactive medications to optimize hemodynamic stability  - Assess quality of pulses, skin color and temperature  - Assess for signs of decreased coronary artery perfusion  - Instruct patient to report change in severity of symptoms  Outcome: Progressing     Problem: RESPIRATORY - ADULT  Goal: Achieves optimal ventilation and oxygenation  Description: INTERVENTIONS:  - Assess for changes in respiratory status  - Assess for changes in mentation and behavior  - Position to facilitate oxygenation and minimize respiratory effort  - Oxygen administered by appropriate delivery if ordered  - Initiate smoking cessation education as indicated  - Encourage broncho-pulmonary hygiene including cough, deep breathe, Incentive Spirometry  - Assess the need for suctioning and aspirate as needed  - Assess and instruct to report SOB or any respiratory difficulty  - Respiratory Therapy support as indicated  Outcome: Progressing     Problem: GASTROINTESTINAL - ADULT  Goal: Minimal or absence of nausea and/or vomiting  Description: INTERVENTIONS:  - Administer IV fluids if ordered to ensure adequate hydration  - Maintain NPO status until nausea and vomiting are resolved  - Nasogastric tube if ordered  - Administer ordered antiemetic medications as needed  - Provide nonpharmacologic comfort measures as appropriate  - Advance diet as tolerated, if ordered  - Consider nutrition services referral to assist patient with adequate nutrition and appropriate food choices  Outcome: Progressing     Problem: METABOLIC, FLUID AND ELECTROLYTES - ADULT  Goal: Electrolytes maintained within normal limits  Description: INTERVENTIONS:  - Monitor labs and assess patient for signs and symptoms of  electrolyte imbalances  - Administer electrolyte replacement as ordered  - Monitor response to electrolyte replacements, including repeat lab results as appropriate  - Instruct patient on fluid and nutrition as appropriate  Outcome: Progressing  Goal: Fluid balance maintained  Description: INTERVENTIONS:  - Monitor labs   - Monitor I/O and WT  - Instruct patient on fluid and nutrition as appropriate  - Assess for signs & symptoms of volume excess or deficit  Outcome: Progressing

## 2024-01-12 NOTE — ASSESSMENT & PLAN NOTE
Presented to [facility] ED with shortness of breath. Troponin and EKG checked as part of initial workup. EKG without acute ischemic changes   NSTEMI likely related to increased demand from acute hypoxic respiratory failure and septic shock   Troponin 25679 > 43235 > 83851     Plan:  ASA 81mg daily   Atorvastatin 80mg qHS   Continue holding home afterload reduction agents/AV aubrie blockers given resolving septic shock:   Carvedilol 6.25mg BID  Isosorbide mononitrate 30mg q24h   Metoprolol succinate 50mg q24  Nifedipine 30mg q24 hours   Eventual cardiac cath  Cardiology consulted, appreciate recommendations

## 2024-01-12 NOTE — PROGRESS NOTES
Procedure Note - Nephrology   Berto Faria 53 y.o. male MRN: [id number]  Unit/Bed#: [facility] 515-01 Encounter: [id number]      Assessment / Plan:  ESRD on HD TTS at [facility] -patient was on CRRT in light of cardiogenic shock and hypotension initially on pressors, now off pressors. Patient transitioned to IHD but back on CRRT as below  -Patient due for hemodialysis 1/11 but held due to rapid afib with hypotension. Transferred to ICU. Started back on CRRT 1/11/24. Now again off pressors on milrinone gtt.   -Target weight 47.5 kg outpatient  -continue UF as tolerated  -monitor serial labs  -consider transition back to IHD if BP stable Monday 1/15/24  Access-AV fistula present and functioning, now with permacath-continue CRRT with this  Anemia due to other ESRD-on Mircera outpatient, Hgb below goal at 7.8 as of today, monitor CBC  Secondary hyperparathyroidism renal origin-continue Cinacalcet 30 mg daily, calcitriol 0.5 mcg 3 times weekly, monitor PTH outpatient  Hyperphosphatemia - off binders, monitor phos on CRRT, phos 3.1 today  History of failed kidney transplant-on azathioprine and tacrolimus as an outpatient, azathioprine on hold, continue tacrolimus home dose for now.  Should have these down titrated per outpatient nephrologist at dialysis unit.  Patient also chronically on prednisone 5mg daily-back on this  Cardiogenic versus septic shock in setting of multifocal pneumonia-on antibiotics per critical care team  Elevated troponin-cardiology following, s/p [facility] today with multivessel CAD, porcelain aorta noted, CT surgery consulted  Acute on chronic diastolic CHF-UF as tolerated on CRRT, continue oral fluid restriction, cardio follows  New onset rapid afib - management per cardiology, on amio gtt  Non gapped acidosis d/t ESRD-  correct with CRRT, bicarb 16        Subjective:   Patient seen and examined by me on CRRT at approximately 12:56 PM.  UF goal -50 mL/h.  Patient remains off pressors now.  For left  "heart cath today.  On milrinone drip.  Patient denies chest pain or shortness of breath.      Objective:     Vitals: Blood pressure 111/59, pulse 90, temperature 97.7 °F (36.5 °C), resp. rate (!) 40, height 5' 4\" (1.626 m), weight 46.8 kg (103 lb 2.8 oz), SpO2 99%.,Body mass index is 17.71 kg/m².Temp (24hrs), Av.7 °F (36.5 °C), Min:96.4 °F (35.8 °C), Max:98.8 °F (37.1 °C)      Weight (last 2 days)       Date/Time Weight    24 0508 46.8 (103.18)    24 1442 50.3 (111)    24 0504 50.4 (111.11)     Weight: WITH PROSTHETIC ON; PT STATES IT WEIGHS 3.2 KG at 24 0504    01/10/24 0555 47.6 (104.94)              Intake/Output Summary (Last 24 hours) at 2024 1605  Last data filed at 2024 1500  Gross per 24 hour   Intake 1674.3 ml   Output 2528 ml   Net -853.7 ml     I/O last 24 hours:  In: 2598.5 [P.O.:236; I.V.:1026.5; IV Piggyback:1336]  Out: 2528 [Other:2528]        Physical Exam:   Physical Exam  Vitals reviewed.   Constitutional:       General: He is not in acute distress.     Appearance: Normal appearance. He is well-developed. He is ill-appearing. He is not diaphoretic.   HENT:      Head: Normocephalic and atraumatic.      Nose: Nose normal.      Mouth/Throat:      Mouth: Mucous membranes are moist.      Pharynx: No oropharyngeal exudate.   Eyes:      General: No scleral icterus.        Right eye: No discharge.         Left eye: No discharge.   Neck:      Thyroid: No thyromegaly.   Cardiovascular:      Rate and Rhythm: Normal rate and regular rhythm.      Heart sounds: Normal heart sounds.   Pulmonary:      Effort: Pulmonary effort is normal.      Breath sounds: Normal breath sounds. No wheezing or rales.   Abdominal:      General: Bowel sounds are normal. There is no distension.      Palpations: Abdomen is soft.      Tenderness: There is no abdominal tenderness.   Musculoskeletal:         General: No swelling. Normal range of motion.      Cervical back: Neck supple. "   Lymphadenopathy:      Cervical: No cervical adenopathy.   Skin:     General: Skin is warm and dry.      Coloration: Skin is pale. Skin is not jaundiced.      Findings: No rash.   Neurological:      General: No focal deficit present.      Mental Status: He is alert.      Comments: awake   Psychiatric:         Mood and Affect: Mood normal.         Behavior: Behavior normal.         Invasive Devices       Peripheral Intravenous Line  Duration             Peripheral IV 01/08/24 Left;Ventral (anterior) Forearm 3 days    Peripheral IV 01/11/24 Left Antecubital <1 day              Arterial Line  Duration             Arterial Line 01/11/24 Radial <1 day              Line  Duration             Hemodialysis AV Fistula Right Upper arm -- days              Hemodialysis Catheter  Duration             HD Permanent Double Catheter 3 days                    Medications:    Scheduled Meds:  Current Facility-Administered Medications   Medication Dose Route Frequency Provider Last Rate    albuterol  2 puff Inhalation Q4H PRN Katie Grecsek, CRNP      amiodarone (CORDARONE) 900 mg in dextrose 5 % 500 mL infusion  0.5 mg/min Intravenous Continuous Katie Grecsek, CRNP 0.5 mg/min (01/12/24 1247)    amiodarone  200 mg Oral TID With Meals Katie Velasco, CRNP      aspirin  81 mg Oral QAM Katie Velasco, CRNP      atorvastatin  80 mg Oral Daily With Dinner Katie Jaramilloek, CRNP      calcitriol  0.5 mcg Oral Once per day on Tuesday Thursday Saturday Katie Rose Mariecsek, CRNP      cefepime  1,000 mg Intravenous Q12H Katie Grecsek, CRNP 1,000 mg (01/12/24 1132)    cinacalcet  30 mg Oral Daily Katie Rose Mariecsek, CRNP      glycerin-hypromellose-  1 drop Right Eye Q4H PRN Katie Grecsek, CRNP      heparin (porcine)  3-20 Units/kg/hr (Order-Specific) Intravenous Titrated Katie Grecsek, CRNP Stopped (01/12/24 1333)    heparin (porcine)  1,500 Units Intravenous Q6H PRN Katie Grecsek, CRNP      heparin (porcine)  3,000 Units Intravenous  Q6H PRN Katie Grecsek, CRNP      insulin glargine  10 Units Subcutaneous Q12H JAMARCUS Katie Grecsek, CRNP      insulin lispro  1-6 Units Subcutaneous HS Katie Grecsek, CRNP      insulin lispro  1-6 Units Subcutaneous TID With Meals Katie Grecsek, CRNP      insulin lispro  8 Units Subcutaneous TID With Meals Katie Grecsek, CRNP      milrinone (Primacor) infusion  0.25 mcg/kg/min Intravenous Continuous Katie Grecsek, CRNP 0.25 mcg/kg/min (01/12/24 1248)    norepinephrine  1-30 mcg/min Intravenous Titrated Katie Grecsek, CRNP 1 mcg/min (01/12/24 1515)    NxStage K 4/Ca 3  20,000 mL Dialysis Continuous Wendy K Doug, DO      ondansetron  4 mg Intravenous Q8H PRN Katie Grecsek, CRNP      pantoprazole  40 mg Oral Early Morning Katie Grecsek, CRNP      predniSONE  5 mg Oral Daily Katie Grecsek, CRNP      tacrolimus  1 mg Oral QPM Katie Grecsek, CRNP      tacrolimus  2 mg Oral QAM Katie Grecsek, CRNP         PRN Meds:.  albuterol    glycerin-hypromellose-    heparin (porcine)    heparin (porcine)    ondansetron    Continuous Infusions:amiodarone (CORDARONE) 900 mg in dextrose 5 % 500 mL infusion, 0.5 mg/min, Last Rate: 0.5 mg/min (01/12/24 1247)  heparin (porcine), 3-20 Units/kg/hr (Order-Specific), Last Rate: Stopped (01/12/24 1333)  milrinone (Primacor) infusion, 0.25 mcg/kg/min, Last Rate: 0.25 mcg/kg/min (01/12/24 1248)  norepinephrine, 1-30 mcg/min, Last Rate: 1 mcg/min (01/12/24 1515)  NxStage K 4/Ca 3, 20,000 mL            LAB RESULTS:      Results from last 7 days   Lab Units 01/12/24  1452 01/12/24  1209 01/12/24  0844 01/12/24  0431 01/12/24  0232 01/11/24 2035 01/11/24  1455 01/11/24  1354 01/11/24  0531 01/10/24  0336 01/09/24  1005 01/09/24  0334 01/08/24  1628 01/08/24  0429 01/07/24  1015   WBC Thousand/uL  --   --   --  10.09  --   --   --  12.97* 10.99* 11.39*  --  13.49*  --  13.80* 13.58*   HEMOGLOBIN g/dL  --  7.8*  --  7.8*  --   --   --  9.3* 9.8* 8.9*  --  8.9*  --  10.3*  12.0   HEMATOCRIT %  --   --   --  22.9*  --   --   --  28.3* 30.3* 27.8*  --  27.6*  --  32.2* 37.9   PLATELETS Thousands/uL  --   --   --  198  --   --   --  237 194 197  --  187  --  215 250   NEUTROS PCT %  --   --   --  61  --   --   --   --   --   --   --  83*  --   --  81*   LYMPHS PCT %  --   --   --  26  --   --   --   --   --   --   --  9*  --   --  10*   LYMPHO PCT %  --   --   --   --   --   --   --   --   --   --   --   --   --  12*  --    MONOS PCT %  --   --   --  10  --   --   --   --   --   --   --  7  --   --  8   MONO PCT %  --   --   --   --   --   --   --   --   --   --   --   --   --  9  --    EOS PCT %  --   --   --  2  --   --   --   --   --   --   --  0  --  0 1   POTASSIUM mmol/L 5.0  --  4.3  --  4.4 4.8 5.0  --  5.2 4.5   < >  --    < > 4.6 3.5   CHLORIDE mmol/L 100  --  101  --  101 100 97  --  96 100   < >  --    < > 95* 96   CO2 mmol/L 16*  --  22  --  22 20* 19*  --  18* 25   < >  --    < > 23 27   BUN mg/dL 40*  --  45*  --  56* 71* 92*  --  81* 51*   < >  --    < > 47* 30*   CREATININE mg/dL 3.30*  --  3.61*  --  4.67* 5.88* 7.72*  --  7.18* 4.92*   < >  --    < > 7.05* 5.36*   CALCIUM mg/dL 8.5  --  8.2*  --  8.2* 8.1* 8.6  --  8.7 8.8   < >  --    < > 8.9 9.3   ALK PHOS U/L  --   --   --  57  --   --   --   --   --   --   --   --   --   --  98   ALT U/L  --   --   --  12  --   --   --   --   --   --   --   --   --   --  19   AST U/L  --   --   --  18  --   --   --   --   --   --   --   --   --   --  27   MAGNESIUM mg/dL 2.2  --  1.9 2.1 2.2 2.7 3.2*  --  3.2* 2.1   < >  --    < > 2.3  --    PHOSPHORUS mg/dL 3.1  --  2.6* 2.8 2.5* 2.5* 2.7  --  3.4 3.1   < >  --    < >  --   --     < > = values in this interval not displayed.       CUTURES:  Lab Results   Component Value Date    BLOODCX No Growth at 72 hrs. 01/08/2024    BLOODCX No Growth at 72 hrs. 01/08/2024    BLOODCX No Growth After 4 Days. 01/07/2024    BLOODCX No Growth After 4 Days. 01/07/2024    BLOODCX No Growth After 5  "Days. 04/27/2023    BLOODCX No Growth After 5 Days. 04/27/2023    BLOODCX No Growth After 5 Days. 10/10/2022    BLOODCX No Growth After 5 Days. 10/10/2022    URINECX No Growth <1000 cfu/mL 02/23/2019    URINECX No Growth <1000 cfu/mL 07/27/2016                 Portions of the record may have been created with voice recognition software. Occasional wrong word or \"sound a like\" substitutions may have occurred due to the inherent limitations of voice recognition software. Read the chart carefully and recognize, using context, where substitutions have occurred.If you have any questions, please contact the dictating provider.    "

## 2024-01-12 NOTE — PLAN OF CARE
Problem: PHYSICAL THERAPY ADULT  Goal: Performs mobility at highest level of function for planned discharge setting.  See evaluation for individualized goals.  Description: Treatment/Interventions: Functional transfer training, LE strengthening/ROM, Therapeutic exercise, Endurance training, Patient/family training, Equipment eval/education, Bed mobility, Gait training, OT, Spoke to nursing  Equipment Recommended: Walker (has rw)       See flowsheet documentation for full assessment, interventions and recommendations.  Note: Prognosis: Good  Problem List: Decreased endurance, Decreased mobility, Impaired balance  Assessment: PT completed evaluation of 53 y.o. male admitted to [facility] on 1/8/2024 as transfer from [facility] where he initially presented on 1/7 with symptoms of SOB. While at HD on 1/11 he was rapid response for afib with RVR. Potential cardiac cath 1/12. Diagnoses include NSTEMI, cardiogenic shock, and acute respiratory failure. Patient's current status instabilities include ongoing admission to critical care pain, CRRT, continuous O2/HR monitoring, falls risk, and a regression in function from baseline.  PMH is significant for ESRD-HD, CHF, renal transplant. Prior to this admission patient resided with spouse in a split level home. At his baseline he is I with mobility (wears prosthetic and ambulates with RW at home but practicing with SPC while at PT), ADLS, and iADLS. + . RW. Patient presents at time of PT evaluation functioning below baseline and currently w/ overall mobility deficits 2* to: decreased gross strength and activity tolerance, decreased skin integrity and falls risk. During PT evaluation, patient currently is requiring min-AX1 for supine<-->sit tranfers and maintained sitting balance EOB x 5 minute with supervision. Not able to assess OOB mobility at this time, per MD due to CRRT.  Anticipate patient will achieve PT goals and d/c home with ongoing OPPT. Patient will  continue to benefit from continued skilled PT this admission to achieve maximal function and safety.        Rehab Resource Intensity Level, PT: III (Minimum Resource Intensity)    See flowsheet documentation for full assessment.

## 2024-01-12 NOTE — CONSULTS
Consultation - Cardiothoracic Surgery   Berto Faria 53 y.o. male MRN: [id number]  Unit/Bed#: [facility] 515-01 Encounter: [id number]    Physician Requesting Consult: Nieves Rios MD    Reason for Consult / Principal Problem: MVCAD     Inpatient consult to Cardiothoracic Surgery  Consult performed by: Khalida Rojas PA-C  Consult ordered by: GAVINO Baker        History of Present Illness: Berto Faria is a 53 y.o. male with a past medical history notable T1DM, failed renal transplant with ESRD on HD, right aka,  who presented to the hospital with complaints of overall ill feeling.  His home pulse oximetry read int he 80s and seen at [facility] for evaluation.  He was found to be in septic shock with pneumonias as the suspected source.He was transferred to [facility] [place] for escalating care and need for potentially CVVHD while on vasopressor support.  He had new onset of atrial fibrillation with RVR and hypotension. In the ICU he was found to have ST changes on EKG and cardiology called to the bedside with STAT echo showing LAD territory hypokinesis.  He had a cardiac catheterization today with MVCAD.  Cardiac surgery now consulted for evaluation.     Past Medical History:  Past Medical History:   Diagnosis Date    Bacteremia 12/21/2018    CAD (coronary artery disease)     s/p MARC to LCx, pLAD 2/2018    Cardiac arrest ([facility])     Chronic kidney disease     Diabetes mellitus type 1 ([facility])     Dialysis patient ([facility])     Access in right chest    GI bleed     Hyperlipidemia     Hypertension     Infection at site of external fixator pin (HCC)     MI (myocardial infarction) ([facility])     Myocardial infarction ([facility])     Pneumonia     Last Assessed [id number]    Renal failure     Renal transplant, status post 07/21/2007     Past Surgical History:   Past Surgical History:   Procedure Laterality Date    AMPUTATION Right 02/2019    aboce knee    ANKLE FRACTURE SURGERY      ANKLE HARDWARE REMOVAL Right 7/31/2017    Procedure:  REMOVAL HARDWARE ANKLE;  Surgeon: Alvin Leon MD;  Location: [facility];  Service: Orthopedics    ANKLE HARDWARE REMOVAL Right 8/17/2017    Procedure: TIBIA FAILED HARDWARE REMOVAL;  Surgeon: Alvin Leon MD;  Location: [facility];  Service: Orthopedics    CLOSED REDUCTION ANKLE Right 7/3/2017    Procedure: CLOSED REDUCTION DISTAL TIB-FIB AND CASTING VS;  Surgeon: Alvin Leon MD;  Location: [facility];  Service: Orthopedics    CORONARY ANGIOPLASTY WITH STENT PLACEMENT  02/2018    CAD s/p MARC to LCx, pLAD    ESOPHAGOGASTRODUODENOSCOPY N/A 12/20/2018    Procedure: ESOPHAGOGASTRODUODENOSCOPY (EGD) in ICU;  Surgeon: Galileo Wise IV, MD;  Location: [facility];  Service: Gastroenterology    EYE SURGERY      FRACTURE SURGERY      ORIF Rt Ankle    GLUTAMIC ACID DECARBOXYLASE (HISTORICAL)      IR AV FISTULAGRAM/GRAFTOGRAM  4/16/2020    IR AV FISTULAGRAM/GRAFTOGRAM  6/23/2023    IR PICC LINE  8/20/2018    IR TEMPORARY DIALYSIS CATHETER CHECK/CHANGE/REPOSITION  1/8/2024    IR TUNNELED DIALYSIS CATHETER CHECK/CHANGE/REPOSITION/ANGIOPLASTY  1/8/2020    IR TUNNELED DIALYSIS CATHETER PLACEMENT  10/21/2019    IR TUNNELED DIALYSIS CATHETER REMOVAL  5/29/2020    IR VENOUS LINE REMOVAL  10/5/2018    LEG AMPUTATION Right 02/01/2019    Above the knee    NEPHRECTOMY TRANSPLANTED ORGAN      NC ARTERIOVENOUS ANASTOMOSIS OPEN DIRECT Right 2/11/2020    Procedure: CREATION FISTULA ARTERIOVENOUS (AV) right upper extremity;  Surgeon: Carlos Medina MD;  Location:  MAIN OR;  Service: Vascular    NC OPEN TREATMENT FRACTURE DISTAL TIBIA FIBULA Right 7/3/2017    Procedure: OPEN REDUCTION W/ INTERNAL FIXATION (ORIF);  Surgeon: Alvin Leon MD;  Location: [facility];  Service: Orthopedics    TOE AMPUTATION Right 10/27/2016    Procedure: AMPUTATION TOE;  Surgeon: Krishna Ruiz DPM;  Location: [facility];  Service:      Family History:  Family History   Problem Relation Age of Onset    Diabetes Brother     Coronary artery disease Mother     No  "Known Problems Father      Social History:  Social History     Substance and Sexual Activity   Alcohol Use Not Currently    Alcohol/week: 0.0 standard drinks of alcohol     Social History     Substance and Sexual Activity   Drug Use Never     Social History     Tobacco Use   Smoking Status Never   Smokeless Tobacco Never     Marital Status: /Civil Union      Home Medications:   Prior to Admission medications    Medication Sig Start Date End Date Taking? Authorizing Provider   aspirin 81 mg chewable tablet Chew 81 mg every morning     Historical Provider, MD   atorvastatin (LIPITOR) 80 mg tablet Take 1 tablet (80 mg total) by mouth daily 10/24/23   GAVINO Ardon   azaTHIOprine (IMURAN) 50 mg tablet Take 1 tablet (50 mg total) by mouth daily 6/29/23   Teo Stevenson DO   carvedilol (COREG) 6.25 mg tablet Take 1 tablet (6.25 mg total) by mouth 2 (two) times a day with meals 9/22/23   GAVINO Ardon   cholecalciferol (VITAMIN D3) 1,000 units tablet Take 5 tablets (5,000 Units total) by mouth daily for 14 days  Patient not taking: Reported on 1/7/2024 10/13/22 7/7/23  GAVINO Ardon   cinacalcet (SENSIPAR) 30 mg tablet Take 1 tablet (30 mg total) by mouth daily 3/7/22   GAVINO Ardon   Glucagon, rDNA, (Glucagon Emergency) 1 MG KIT INJECT 1MG SUBCUTANEOUSLY ONCE AS NEEDED FOR LOW BLOOD SUGAR FOR UP TO 1 DOSE 10/3/22   Dalton Anderson DO   glucose blood (Contour Next Test) test strip Use as instructed 6 times daily 12/12/23   Teo Stevenson DO   insulin aspart (NovoLOG) 100 units/mL injection 10 Units by Subcutaneous Insulin Pump route 3 (three) times a day  Patient taking differently: 8 Units by Subcutaneous Insulin Pump route 2 (two) times a day as needed 8/21/23   GAVINO Ardon   insulin glargine (Semglee) 100 units/mL subcutaneous injection Inject 10 Units under the skin every 12 (twelve) hours 2/17/23   Teo Stevenson DO   Insulin Syringe-Needle U-100 31G X 15/64\" 0.5 ML MISC " Use 3 (three) times a day 3/10/22   GAVINO Ardon   isosorbide mononitrate (IMDUR) 30 mg 24 hr tablet Take 1 tablet (30 mg total) by mouth daily 10/22/23   Teo Stevenson DO   metoprolol succinate (TOPROL-XL) 50 mg 24 hr tablet Take 1 tablet (50 mg total) by mouth daily 10/22/23   Teo Stevenson DO   multivitamin (THERAGRAN) TABS Take 1 tablet by mouth daily    Historical Provider, MD   NIFEdipine (PROCARDIA XL) 30 mg 24 hr tablet Take 4 tablets (120 mg total) by mouth daily 10/17/23   GAVINO Ardon   pantoprazole (PROTONIX) 40 mg tablet Take 1 tablet (40 mg total) by mouth daily 11/28/23   GAVINO Ardon   predniSONE 5 mg tablet Take 1 tablet (5 mg total) by mouth daily 10/24/23   GAVINO Ardon   tacrolimus (PROGRAF) 1 mg capsule 2 caps in the morning and 1 cap in the evening 9/26/23   Teo Stevenson DO       Inpatient Medications:  Scheduled Meds:   Current Facility-Administered Medications   Medication Dose Route Frequency Provider Last Rate    albuterol  2 puff Inhalation Q4H PRN GAVINO Baker      amiodarone (CORDARONE) 900 mg in dextrose 5 % 500 mL infusion  0.5 mg/min Intravenous Continuous GAVINO Baker 0.5 mg/min (01/12/24 1247)    amiodarone  200 mg Oral TID With Meals GAVINO Baker      aspirin  81 mg Oral QAM GAVINO Baker      atorvastatin  80 mg Oral Daily With Dinner GAVINO Baker      calcitriol  0.5 mcg Oral Once per day on Tuesday Thursday Saturday GAVINO Baker      cefepime  1,000 mg Intravenous Q12H GAVINO Baker 1,000 mg (01/12/24 1132)    cinacalcet  30 mg Oral Daily GAVINO Baker      glycerin-hypromellose-  1 drop Right Eye Q4H PRN GAVINO Baker      heparin (porcine)  3-20 Units/kg/hr (Order-Specific) Intravenous Titrated GAVINO Baker Stopped (01/12/24 1333)    heparin (porcine)  1,500 Units Intravenous Q6H PRN GAVINO Baker      heparin (porcine)  3,000 Units  Intravenous Q6H PRN Katie Grecsek, CRNP      insulin glargine  10 Units Subcutaneous Q12H JAMARCUS Katie Grecsek, CRNP      insulin lispro  1-6 Units Subcutaneous HS Katie Grecsek, CRNP      insulin lispro  1-6 Units Subcutaneous TID With Meals Katie Grecsek, CRNP      insulin lispro  8 Units Subcutaneous TID With Meals Katie Grecsek, CRNP      milrinone (Primacor) infusion  0.25 mcg/kg/min Intravenous Continuous Katie Grecsek, CRNP 0.25 mcg/kg/min (01/12/24 1248)    norepinephrine  1-30 mcg/min Intravenous Titrated Katie Grecsek, CRNP Stopped (01/12/24 1131)    NxStage K 4/Ca 3  20,000 mL Dialysis Continuous Wendy K Doug, DO      ondansetron  4 mg Intravenous Q8H PRN Katie Grecsek, CRNP      pantoprazole  40 mg Oral Early Morning Katie Grecsek, CRNP      predniSONE  5 mg Oral Daily Katie Grecsek, CRNP      tacrolimus  1 mg Oral QPM Katie Grecsek, CRNP      tacrolimus  2 mg Oral QAM Katie Grecsek, CRNP       Continuous Infusions: amiodarone (CORDARONE) 900 mg in dextrose 5 % 500 mL infusion, 0.5 mg/min, Last Rate: 0.5 mg/min (01/12/24 1247)  heparin (porcine), 3-20 Units/kg/hr (Order-Specific), Last Rate: Stopped (01/12/24 1333)  milrinone (Primacor) infusion, 0.25 mcg/kg/min, Last Rate: 0.25 mcg/kg/min (01/12/24 1248)  norepinephrine, 1-30 mcg/min, Last Rate: Stopped (01/12/24 1131)  NxStage K 4/Ca 3, 20,000 mL      PRN Meds:  albuterol, 2 puff, Q4H PRN  glycerin-hypromellose-, 1 drop, Q4H PRN  heparin (porcine), 1,500 Units, Q6H PRN  heparin (porcine), 3,000 Units, Q6H PRN  ondansetron, 4 mg, Q8H PRN        Allergies:  No Known Allergies    Review of Systems:  Review of Systems   Constitutional:  Positive for diaphoresis, fatigue and fever.   HENT: Negative.     Eyes: Negative.    Respiratory:  Positive for shortness of breath.    Cardiovascular: Negative.    Gastrointestinal: Negative.    Endocrine: Negative.    Genitourinary:         On HD   Musculoskeletal: Negative.    Skin:  "Negative.    Allergic/Immunologic: Negative.    Neurological: Negative.    Hematological: Negative.    Psychiatric/Behavioral: Negative.         Vital Signs:     Vitals:    01/12/24 1131 01/12/24 1200 01/12/24 1300 01/12/24 1335   BP: 97/55 112/59 106/54    BP Location:  Left arm     Pulse:  89 88    Resp:  (!) 31 (!) 26    Temp:  98.4 °F (36.9 °C) 98.4 °F (36.9 °C)    TempSrc:  Rectal     SpO2:  98% 97% 100%   Weight:       Height: 5' 4\" (1.626 m)        Invasive Devices       Peripheral Intravenous Line  Duration             Peripheral IV 01/08/24 Left;Ventral (anterior) Forearm 3 days    Peripheral IV 01/11/24 Left Antecubital <1 day              Arterial Line  Duration             Arterial Line 01/11/24 Radial <1 day              Line  Duration             Hemodialysis AV Fistula Right Upper arm -- days              Hemodialysis Catheter  Duration             HD Permanent Double Catheter 3 days                    Physical Exam:  Physical Exam  Constitutional:       Appearance: He is ill-appearing and toxic-appearing.   HENT:      Head: Normocephalic and atraumatic.      Right Ear: External ear normal.      Left Ear: External ear normal.      Nose: Nose normal.      Mouth/Throat:      Mouth: Mucous membranes are dry.      Pharynx: Oropharynx is clear.   Eyes:      General: No scleral icterus.        Right eye: No discharge.         Left eye: No discharge.      Extraocular Movements: Extraocular movements intact.      Conjunctiva/sclera: Conjunctivae normal.      Pupils: Pupils are equal, round, and reactive to light.   Cardiovascular:      Rate and Rhythm: Normal rate and regular rhythm.   Pulmonary:      Effort: Pulmonary effort is normal. No respiratory distress.      Breath sounds: Normal breath sounds.   Abdominal:      General: Abdomen is flat. There is no distension.      Palpations: Abdomen is soft.      Tenderness: There is no abdominal tenderness.   Musculoskeletal:      Cervical back: Normal range of " motion.      Comments: Right AKA   Skin:     General: Skin is warm and dry.   Neurological:      General: No focal deficit present.      Mental Status: He is oriented to person, place, and time.   Psychiatric:         Mood and Affect: Mood normal.         Behavior: Behavior normal.         Thought Content: Thought content normal.         Judgment: Judgment normal.         Lab Results:     Results from last 7 days   Lab Units 01/12/24  1209 01/12/24  0431 01/11/24  1354 01/11/24  0531   WBC Thousand/uL  --  10.09 12.97* 10.99*   HEMOGLOBIN g/dL 7.8* 7.8* 9.3* 9.8*   HEMATOCRIT %  --  22.9* 28.3* 30.3*   PLATELETS Thousands/uL  --  198 237 194     Results from last 7 days   Lab Units 01/12/24  0844 01/12/24  0232 01/11/24  2035   POTASSIUM mmol/L 4.3 4.4 4.8   CHLORIDE mmol/L 101 101 100   CO2 mmol/L 22 22 20*   BUN mg/dL 45* 56* 71*   CREATININE mg/dL 3.61* 4.67* 5.88*   CALCIUM mg/dL 8.2* 8.2* 8.1*     Results from last 7 days   Lab Units 01/12/24  0232 01/11/24  2005 01/11/24  1354 01/07/24  1906 01/07/24  1015   INR  1.23*  --  1.17  --  1.00   PTT seconds 74* 89* 45*   < > 37    < > = values in this interval not displayed.     Lab Results   Component Value Date    HGBA1C 7.7 (H) 01/07/2024     Lab Results   Component Value Date    CKTOTAL 82 10/10/2022    TROPONINI 0.09 (H) 02/09/2021       Imaging Studies:     Cardiac Catheterization:     Impression         Ost LAD lesion is 95% stenosed.    Ost Cx to Prox Cx lesion is 75% stenosed.    Prox RCA lesion is 100% stenosed.    Mid LAD lesion is 60% stenosed.    Dist LM lesion is 50% stenosed.    1st Mrg lesion is 100% stenosed.     Severe in-stent restenosis bifurcation ostial LAD/LCX  Occluded RCA ()  Moderately elevated LVEDP  Porcelain aorta    I have personally reviewed pertinent reports.      Assessment:  Principal Problem:    Cardiogenic shock (HCC)  Active Problems:    Type 1 diabetes mellitus on insulin therapy (HCC)    NSTEMI (non-ST elevated myocardial  infarction) (HCC)    S/P AKA (above knee amputation), right (HCC)    Acute respiratory failure with hypoxia (HCC)    Chronic anemia    ESRD (end stage renal disease) (HCC)    Multifocal pneumonia    Acute on chronic diastolic (congestive) heart failure (HCC)    Septic shock (HCC)    Renal transplant failure and rejection    New onset a-fib (HCC)  MVCAD  Porcelain Aorta     Plan:  Due to the nature of this gentleman's porcelain aorta, cardiac surgery is not indicated.    Dr. Foster for attestation.  Patient and family aware of cardiac surgery contraindication.     Berto Faria was comfortable with our recommendations, and their questions were answered to their satisfaction.  We will continue to evaluate the patient daily with further recommendations as work up is completed.  Thank you for allowing us to participate in the care of this patient.     SIGNATURE: Khalida Rojas PA-C  DATE: January 12, 2024  TIME: 2:30 PM    * This note was completed in part utilizing [employer] direct voice recognition software.   Grammatical errors, random word insertion, spelling mistakes, and incomplete sentences may be an occasional consequence of the system secondary to software limitations, ambient noise and hardware issues. At the time of dictation, efforts were made to edit, clarify and /or correct errors. Please read the chart carefully and recognize, using context, where substitutions have occurred.  If you have any questions or concerns about the context, text or information contained within the body of this dictation, please contact myself, the provider, for further clarification.

## 2024-01-12 NOTE — PROGRESS NOTES
"Cardiology Progress Note - Berto Faria 53 y.o. male MRN: [id number]    Unit/Bed#: [facility] 515-01 Encounter: [id number]      Assessment:  Shock - septic/cardiogenic   Paroxysmal atrial fibrillation   Acute HFrEF  Multifocal pneumonia   Cardiomyopathy - presumed ischemic - EF 30%  Acute respiratory failure with hypoxia  Type 2 myocardial infarction secondary to #2/4  ESRD on HD - current on CVVH  Renal transplant failure/rejection   Right AKA secondary to osteomyelitis   Type 1 diabetes mellitus    Plan:  Worsening hemodynamics yesterday necessitating pressors/milrinone - VBGs noted  Maintaining NSR on IV amiodarone, will initiate oral at least short term, continue IV   Beta-blockers on hold in setting of #1  Continue IV heparin with eventual DOAC  Volume management with CVVH  Continue aspirin and high intensity statin therapy  For left heart catheterization today to redefine coronary anatomy  Wife updated at bedside    Subjective:   Patient seen and examined.  No significant events overnight.  .    Objective:     Vitals: Blood pressure 102/52, pulse 91, temperature 98.4 °F (36.9 °C), resp. rate (!) 24, height 5' 4\" (1.626 m), weight 46.8 kg (103 lb 2.8 oz), SpO2 95%., Body mass index is 17.71 kg/m².,   Orthostatic Blood Pressures      Flowsheet Row Most Recent Value   Blood Pressure 102/52 filed at 01/12/2024 0900   Patient Position - Orthostatic VS Lying filed at 01/12/2024 0800              Intake/Output Summary (Last 24 hours) at 1/12/2024 0952  Last data filed at 1/12/2024 0700  Gross per 24 hour   Intake 1772.49 ml   Output 1671 ml   Net 101.49 ml           Physical Exam:    GEN: Berto Faria appears well, alert and oriented x 3, pleasant and cooperative   HEENT: pupils equal, round, and reactive to light; extraocular muscles intact  NECK: supple, no carotid bruits   HEART: regular rhythm, normal S1 and S2, no murmurs  LUNGS: Coarse breath sounds bilaterally  ABDOMEN: normal bowel sounds, soft, no " tenderness, no distention  EXTREMITIES: Right AKA, left lower extremity warm with no significant edema  NEURO: no focal findings   SKIN: normal without suspicious lesions on exposed skin    Medications:      Current Facility-Administered Medications:     albuterol (PROVENTIL HFA,VENTOLIN HFA) inhaler 2 puff, 2 puff, Inhalation, Q4H PRN, Diandra Spironello V, CRNP    [] amiodarone (CORDARONE) 900 mg in dextrose 5 % 500 mL infusion, 1 mg/min, Intravenous, Continuous, Stopped at 24 1711 **FOLLOWED BY** amiodarone (CORDARONE) 900 mg in dextrose 5 % 500 mL infusion, 0.5 mg/min, Intravenous, Continuous, Diandra Spironello V, CRNP, Last Rate: 16.7 mL/hr at 24 171, 0.5 mg/min at 24 1711    aspirin chewable tablet 81 mg, 81 mg, Oral, QAM, Diandra Spironello V, CRNP, 81 mg at 24 0855    atorvastatin (LIPITOR) tablet 80 mg, 80 mg, Oral, Daily With Dinner, Diandra Spironello V, CRNP, 80 mg at 24 1617    calcitriol (ROCALTROL) capsule 0.5 mcg, 0.5 mcg, Oral, Once per day on , Diandra Spironello V, CRNP, 0.5 mcg at 24 0850    calcium gluconate 2 g in sodium chloride 0.9% 100 mL (premix), 2 g, Intravenous, Once, Nieves Rios MD, Last Rate: 100 mL/hr at 24 0938, 2 g at 24 0938    cefepime (MAXIPIME) 1,000 mg in dextrose 5 % 50 mL IVPB, 1,000 mg, Intravenous, Q24H, Diandra Spironello V, CRNP, Last Rate: 100 mL/hr at 24 1336, 1,000 mg at 24 1336    cinacalcet (SENSIPAR) tablet 30 mg, 30 mg, Oral, Daily, Diandra Spironello V, CRNP, 30 mg at 24 0855    glycerin-hypromellose- (ARTIFICIAL TEARS) ophthalmic solution 1 drop, 1 drop, Right Eye, Q4H PRN, Diandra Spironello V, CRNP    heparin (porcine) 25,000 units in 0.45% NaCl 250 mL infusion (premix), 3-20 Units/kg/hr (Order-Specific), Intravenous, Titrated, Diandra Spironello V, CRNP, Last Rate: 6 mL/hr at 24 1600, 12 Units/kg/hr at 24 1600    heparin (porcine)  injection 1,500 Units, 1,500 Units, Intravenous, Q6H PRN, Diandra Ferrero V, CRNP    heparin (porcine) injection 3,000 Units, 3,000 Units, Intravenous, Q6H PRN, Diandra Frerero V, CRNP    insulin glargine (LANTUS) subcutaneous injection 10 Units 0.1 mL, 10 Units, Subcutaneous, Q12H JAMARCUS, Giacomo Molina PA-C    insulin lispro (HumaLOG) 100 units/mL subcutaneous injection 1-6 Units, 1-6 Units, Subcutaneous, HS, Diandra Michael V, CRNP, 2 Units at 01/08/24 2108    insulin lispro (HumaLOG) 100 units/mL subcutaneous injection 1-6 Units, 1-6 Units, Subcutaneous, TID With Meals, 1 Units at 01/11/24 1621 **AND** Fingerstick Glucose (POCT), , , TID AC, Diandra Ferrero V, GAVINO    insulin lispro (HumaLOG) 100 units/mL subcutaneous injection 8 Units, 8 Units, Subcutaneous, TID With Meals, Giacomo Molina PA-C    magnesium sulfate IVPB (premix) SOLN 1 g, 1 g, Intravenous, Once, Nieves Rios MD    milrinone (PRIMACOR) 20 mg in 100 mL infusion (premix), 0.25 mcg/kg/min, Intravenous, Continuous, Giacomo Molina PA-C, Last Rate: 3.8 mL/hr at 01/11/24 1832, 0.25 mcg/kg/min at 01/11/24 1832    norepinephrine (LEVOPHED) 4 mg (STANDARD CONCENTRATION) IV in sodium chloride 0.9% 250 mL, 1-30 mcg/min, Intravenous, Titrated, GAVINO Melissa, Last Rate: 7.5 mL/hr at 01/12/24 0606, 2 mcg/min at 01/12/24 0606    NxStage K 4/Ca 3 dialysis solution (RFP-401) 20,000 mL, 20,000 mL, Dialysis, Continuous, Wendy K Doug, DO, 20,000 mL at 01/12/24 0109    ondansetron (ZOFRAN) injection 4 mg, 4 mg, Intravenous, Q8H PRN, Diandra Michael V, BERLINNP, 4 mg at 01/08/24 1347    pantoprazole (PROTONIX) EC tablet 40 mg, 40 mg, Oral, Early Morning, Diandra Michael V, CRNP, 40 mg at 01/12/24 0505    predniSONE tablet 5 mg, 5 mg, Oral, Daily, Diandra Michael V, CRNP, 5 mg at 01/12/24 0855    tacrolimus (PROGRAF) capsule 1 mg, 1 mg, Oral, QPM, Diandra Michael V, CRNP, 1 mg at 01/11/24 1717    tacrolimus  (PROGRAF) capsule 2 mg, 2 mg, Oral, Diandra CAMPBELLo V, CRNP, 2 mg at 01/12/24 0856     Labs & Results:        Results from last 7 days   Lab Units 01/12/24  0431 01/11/24  1354 01/11/24  0531   WBC Thousand/uL 10.09 12.97* 10.99*   HEMOGLOBIN g/dL 7.8* 9.3* 9.8*   HEMATOCRIT % 22.9* 28.3* 30.3*   PLATELETS Thousands/uL 198 237 194         Results from last 7 days   Lab Units 01/12/24  0844 01/12/24  0431 01/12/24 0232 01/11/24  2035 01/08/24  0429 01/07/24  1015   POTASSIUM mmol/L 4.3  --  4.4 4.8   < > 3.5   CHLORIDE mmol/L 101  --  101 100   < > 96   CO2 mmol/L 22  --  22 20*   < > 27   BUN mg/dL 45*  --  56* 71*   < > 30*   CREATININE mg/dL 3.61*  --  4.67* 5.88*   < > 5.36*   CALCIUM mg/dL 8.2*  --  8.2* 8.1*   < > 9.3   ALK PHOS U/L  --  57  --   --   --  98   ALT U/L  --  12  --   --   --  19   AST U/L  --  18  --   --   --  27    < > = values in this interval not displayed.     Results from last 7 days   Lab Units 01/12/24 0232 01/11/24 2005 01/11/24  1354 01/07/24  1906 01/07/24  1015   INR  1.23*  --  1.17  --  1.00   PTT seconds 74* 89* 45*   < > 37    < > = values in this interval not displayed.     Results from last 7 days   Lab Units 01/12/24  0844 01/12/24 0431 01/12/24 0232   MAGNESIUM mg/dL 1.9 2.1 2.2     Counseling / Coordination of Care  Total floor / unit time spent today 25 minutes.  Greater than 50% of total time was spent with the patient and / or family counseling and / or coordination of care.

## 2024-01-12 NOTE — PLAN OF CARE
Problem: Prexisting or High Potential for Compromised Skin Integrity  Goal: Skin integrity is maintained or improved  Description: INTERVENTIONS:  - Identify patients at risk for skin breakdown  - Assess and monitor skin integrity  - Assess and monitor nutrition and hydration status  - Monitor labs   - Assess for incontinence   - Turn and reposition patient  - Assist with mobility/ambulation  - Relieve pressure over bony prominences  - Avoid friction and shearing  - Provide appropriate hygiene as needed including keeping skin clean and dry  - Evaluate need for skin moisturizer/barrier cream  - Collaborate with interdisciplinary team   - Patient/family teaching  - Consider wound care consult   Outcome: Progressing     Problem: PAIN - ADULT  Goal: Verbalizes/displays adequate comfort level or baseline comfort level  Description: Interventions:  - Encourage patient to monitor pain and request assistance  - Assess pain using appropriate pain scale  - Administer analgesics based on type and severity of pain and evaluate response  - Implement non-pharmacological measures as appropriate and evaluate response  - Consider cultural and social influences on pain and pain management  - Notify physician/advanced practitioner if interventions unsuccessful or patient reports new pain  Outcome: Progressing     Problem: INFECTION - ADULT  Goal: Absence or prevention of progression during hospitalization  Description: INTERVENTIONS:  - Assess and monitor for signs and symptoms of infection  - Monitor lab/diagnostic results  - Monitor all insertion sites, i.e. indwelling lines, tubes, and drains  - Monitor endotracheal if appropriate and nasal secretions for changes in amount and color  - [place] appropriate cooling/warming therapies per order  - Administer medications as ordered  - Instruct and encourage patient and family to use good hand hygiene technique  - Identify and instruct in appropriate isolation precautions for  identified infection/condition  Outcome: Progressing  Goal: Absence of fever/infection during neutropenic period  Description: INTERVENTIONS:  - Monitor WBC    Outcome: Progressing     Problem: SAFETY ADULT  Goal: Patient will remain free of falls  Description: INTERVENTIONS:  - Educate patient/family on patient safety including physical limitations  - Instruct patient to call for assistance with activity   - Consult OT/PT to assist with strengthening/mobility   - Keep Call bell within reach  - Keep bed low and locked with side rails adjusted as appropriate  - Keep care items and personal belongings within reach  - Initiate and maintain comfort rounds  - Make Fall Risk Sign visible to staff  - Offer Toileting every  Hours, in advance of need  - Initiate/Maintain alarm  - Obtain necessary fall risk management equipment:   - Apply yellow socks and bracelet for high fall risk patients  - Consider moving patient to room near nurses station  Outcome: Progressing  Goal: Maintain or return to baseline ADL function  Description: INTERVENTIONS:  -  Assess patient's ability to carry out ADLs; assess patient's baseline for ADL function and identify physical deficits which impact ability to perform ADLs (bathing, care of mouth/teeth, toileting, grooming, dressing, etc.)  - Assess/evaluate cause of self-care deficits   - Assess range of motion  - Assess patient's mobility; develop plan if impaired  - Assess patient's need for assistive devices and provide as appropriate  - Encourage maximum independence but intervene and supervise when necessary  - Involve family in performance of ADLs  - Assess for home care needs following discharge   - Consider OT consult to assist with ADL evaluation and planning for discharge  - Provide patient education as appropriate  Outcome: Progressing  Goal: Maintains/Returns to pre admission functional level  Description: INTERVENTIONS:  - Perform AM-PAC 6 Click Basic Mobility/ Daily Activity  assessment daily.  - Set and communicate daily mobility goal to care team and patient/family/caregiver.   - Collaborate with rehabilitation services on mobility goals if consulted  - Perform Range of Motion  times a day.  - Reposition patient every  hours.  - Dangle patient  times a day  - Stand patient  times a day  - Ambulate patient  times a day  - Out of bed to chair  times a day   - Out of bed for meals  times a day  - Out of bed for toileting  - Record patient progress and toleration of activity level   Outcome: Progressing     Problem: DISCHARGE PLANNING  Goal: Discharge to home or other facility with appropriate resources  Description: INTERVENTIONS:  - Identify barriers to discharge w/patient and caregiver  - Arrange for needed discharge resources and transportation as appropriate  - Identify discharge learning needs (meds, wound care, etc.)  - Arrange for interpretive services to assist at discharge as needed  - Refer to Case Management Department for coordinating discharge planning if the patient needs post-hospital services based on physician/advanced practitioner order or complex needs related to functional status, cognitive ability, or social support system  Outcome: Progressing     Problem: Knowledge Deficit  Goal: Patient/family/caregiver demonstrates understanding of disease process, treatment plan, medications, and discharge instructions  Description: Complete learning assessment and assess knowledge base.  Interventions:  - Provide teaching at level of understanding  - Provide teaching via preferred learning methods  Outcome: Progressing     Problem: NEUROSENSORY - ADULT  Goal: Achieves stable or improved neurological status  Description: INTERVENTIONS  - Monitor and report changes in neurological status  - Monitor vital signs such as temperature, blood pressure, glucose, and any other labs ordered   - Initiate measures to prevent increased intracranial pressure  - Monitor for seizure activity and  implement precautions if appropriate      Outcome: Progressing  Goal: Remains free of injury related to seizures activity  Description: INTERVENTIONS  - Maintain airway, patient safety  and administer oxygen as ordered  - Monitor patient for seizure activity, document and report duration and description of seizure to physician/advanced practitioner  - If seizure occurs,  ensure patient safety during seizure  - Reorient patient post seizure  - Seizure pads on all 4 side rails  - Instruct patient/family to notify RN of any seizure activity including if an aura is experienced  - Instruct patient/family to call for assistance with activity based on nursing assessment  - Administer anti-seizure medications if ordered    Outcome: Progressing  Goal: Achieves maximal functionality and self care  Description: INTERVENTIONS  - Monitor swallowing and airway patency with patient fatigue and changes in neurological status  - Encourage and assist patient to increase activity and self care.   - Encourage visually impaired, hearing impaired and aphasic patients to use assistive/communication devices  Outcome: Progressing     Problem: CARDIOVASCULAR - ADULT  Goal: Maintains optimal cardiac output and hemodynamic stability  Description: INTERVENTIONS:  - Monitor I/O, vital signs and rhythm  - Monitor for S/S and trends of decreased cardiac output  - Administer and titrate ordered vasoactive medications to optimize hemodynamic stability  - Assess quality of pulses, skin color and temperature  - Assess for signs of decreased coronary artery perfusion  - Instruct patient to report change in severity of symptoms  Outcome: Progressing  Goal: Absence of cardiac dysrhythmias or at baseline rhythm  Description: INTERVENTIONS:  - Continuous cardiac monitoring, vital signs, obtain 12 lead EKG if ordered  - Administer antiarrhythmic and heart rate control medications as ordered  - Monitor electrolytes and administer replacement therapy as  ordered  Outcome: Progressing     Problem: RESPIRATORY - ADULT  Goal: Achieves optimal ventilation and oxygenation  Description: INTERVENTIONS:  - Assess for changes in respiratory status  - Assess for changes in mentation and behavior  - Position to facilitate oxygenation and minimize respiratory effort  - Oxygen administered by appropriate delivery if ordered  - Initiate smoking cessation education as indicated  - Encourage broncho-pulmonary hygiene including cough, deep breathe, Incentive Spirometry  - Assess the need for suctioning and aspirate as needed  - Assess and instruct to report SOB or any respiratory difficulty  - Respiratory Therapy support as indicated  Outcome: Progressing     Problem: GASTROINTESTINAL - ADULT  Goal: Minimal or absence of nausea and/or vomiting  Description: INTERVENTIONS:  - Administer IV fluids if ordered to ensure adequate hydration  - Maintain NPO status until nausea and vomiting are resolved  - Nasogastric tube if ordered  - Administer ordered antiemetic medications as needed  - Provide nonpharmacologic comfort measures as appropriate  - Advance diet as tolerated, if ordered  - Consider nutrition services referral to assist patient with adequate nutrition and appropriate food choices  Outcome: Progressing  Goal: Maintains or returns to baseline bowel function  Description: INTERVENTIONS:  - Assess bowel function  - Encourage oral fluids to ensure adequate hydration  - Administer IV fluids if ordered to ensure adequate hydration  - Administer ordered medications as needed  - Encourage mobilization and activity  - Consider nutritional services referral to assist patient with adequate nutrition and appropriate food choices  Outcome: Progressing  Goal: Maintains adequate nutritional intake  Description: INTERVENTIONS:  - Monitor percentage of each meal consumed  - Identify factors contributing to decreased intake, treat as appropriate  - Assist with meals as needed  - Monitor I&O,  weight, and lab values if indicated  - Obtain nutrition services referral as needed  Outcome: Progressing  Goal: Establish and maintain optimal ostomy function  Description: INTERVENTIONS:  - Assess bowel function  - Encourage oral fluids to ensure adequate hydration  - Administer IV fluids if ordered to ensure adequate hydration   - Administer ordered medications as needed  - Encourage mobilization and activity  - Nutrition services referral to assist patient with appropriate food choices  - Assess stoma site  - Consider wound care consult   Outcome: Progressing  Goal: Oral mucous membranes remain intact  Description: INTERVENTIONS  - Assess oral mucosa and hygiene practices  - Implement preventative oral hygiene regimen  - Implement oral medicated treatments as ordered  - Initiate Nutrition services referral as needed  Outcome: Progressing     Problem: GENITOURINARY - ADULT  Goal: Maintains or returns to baseline urinary function  Description: INTERVENTIONS:  - Assess urinary function  - Encourage oral fluids to ensure adequate hydration if ordered  - Administer IV fluids as ordered to ensure adequate hydration  - Administer ordered medications as needed  - Offer frequent toileting  - Follow urinary retention protocol if ordered  Outcome: Progressing  Goal: Absence of urinary retention  Description: INTERVENTIONS:  - Assess patient’s ability to void and empty bladder  - Monitor I/O  - Bladder scan as needed  - Discuss with physician/AP medications to alleviate retention as needed  - Discuss catheterization for long term situations as appropriate  Outcome: Progressing  Goal: Urinary catheter remains patent  Description: INTERVENTIONS:  - Assess patency of urinary catheter  - If patient has a chronic krishnan, consider changing catheter if non-functioning  - Follow guidelines for intermittent irrigation of non-functioning urinary catheter  Outcome: Progressing     Problem: METABOLIC, FLUID AND ELECTROLYTES -  ADULT  Goal: Electrolytes maintained within normal limits  Description: INTERVENTIONS:  - Monitor labs and assess patient for signs and symptoms of electrolyte imbalances  - Administer electrolyte replacement as ordered  - Monitor response to electrolyte replacements, including repeat lab results as appropriate  - Instruct patient on fluid and nutrition as appropriate  Outcome: Progressing  Goal: Fluid balance maintained  Description: INTERVENTIONS:  - Monitor labs   - Monitor I/O and WT  - Instruct patient on fluid and nutrition as appropriate  - Assess for signs & symptoms of volume excess or deficit  Outcome: Progressing  Goal: Glucose maintained within target range  Description: INTERVENTIONS:  - Monitor Blood Glucose as ordered  - Assess for signs and symptoms of hyperglycemia and hypoglycemia  - Administer ordered medications to maintain glucose within target range  - Assess nutritional intake and initiate nutrition service referral as needed  Outcome: Progressing     Problem: SKIN/TISSUE INTEGRITY - ADULT  Goal: Skin Integrity remains intact(Skin Breakdown Prevention)  Description: Assess:  -Perform Frank assessment every   -Clean and moisturize skin every   -Inspect skin when repositioning, toileting, and assisting with ADLS  -Assess under medical devices such as  every   -Assess extremities for adequate circulation and sensation     Bed Management:  -Have minimal linens on bed & keep smooth, unwrinkled  -Change linens as needed when moist or perspiring  -Avoid sitting or lying in one position for more than  hours while in bed  -Keep HOB at degrees     Toileting:  -Offer bedside commode  -Assess for incontinence every   -Use incontinent care products after each incontinent episode such as     Activity:  -Mobilize patient  times a day  -Encourage activity and walks on unit  -Encourage or provide ROM exercises   -Turn and reposition patient every  Hours  -Use appropriate equipment to lift or move patient in  bed  -Instruct/ Assist with weight shifting every  when out of bed in chair  -Consider limitation of chair time  hour intervals    Skin Care:  -Avoid use of baby powder, tape, friction and shearing, hot water or constrictive clothing  -Relieve pressure over bony prominences using   -Do not massage red bony areas    Next Steps:  -Teach patient strategies to minimize risks such as    -Consider consults to  interdisciplinary teams such as   Outcome: Progressing  Goal: Incision(s), wounds(s) or drain site(s) healing without S/S of infection  Description: INTERVENTIONS  - Assess and document dressing, incision, wound bed, drain sites and surrounding tissue  - Provide patient and family education  - Perform skin care/dressing changes every   Outcome: Progressing  Goal: Pressure injury heals and does not worsen  Description: Interventions:  - Implement low air loss mattress or specialty surface (Criteria met)  - Apply silicone foam dressing  - Instruct/assist with weight shifting every  minutes when in chair   - Limit chair time to  hour intervals  - Use special pressure reducing interventions such as  when in chair   - Apply fecal or urinary incontinence containment device   - Perform passive or active ROM every   - Turn and reposition patient & offload bony prominences every  hours   - Utilize friction reducing device or surface for transfers   - Consider consults to  interdisciplinary teams such as   - Use incontinent care products after each incontinent episode such as   - Consider nutrition services referral as needed  Outcome: Progressing     Problem: HEMATOLOGIC - ADULT  Goal: Maintains hematologic stability  Description: INTERVENTIONS  - Assess for signs and symptoms of bleeding or hemorrhage  - Monitor labs  - Administer supportive blood products/factors as ordered and appropriate  Outcome: Progressing     Problem: MUSCULOSKELETAL - ADULT  Goal: Maintain or return mobility to safest level of function  Description:  INTERVENTIONS:  - Assess patient's ability to carry out ADLs; assess patient's baseline for ADL function and identify physical deficits which impact ability to perform ADLs (bathing, care of mouth/teeth, toileting, grooming, dressing, etc.)  - Assess/evaluate cause of self-care deficits   - Assess range of motion  - Assess patient's mobility  - Assess patient's need for assistive devices and provide as appropriate  - Encourage maximum independence but intervene and supervise when necessary  - Involve family in performance of ADLs  - Assess for home care needs following discharge   - Consider OT consult to assist with ADL evaluation and planning for discharge  - Provide patient education as appropriate  Outcome: Progressing  Goal: Maintain proper alignment of affected body part  Description: INTERVENTIONS:  - Support, maintain and protect limb and body alignment  - Provide patient/ family with appropriate education  Outcome: Progressing     Problem: Nutrition/Hydration-ADULT  Goal: Nutrient/Hydration intake appropriate for improving, restoring or maintaining nutritional needs  Description: Monitor and assess patient's nutrition/hydration status for malnutrition. Collaborate with interdisciplinary team and initiate plan and interventions as ordered.  Monitor patient's weight and dietary intake as ordered or per policy. Utilize nutrition screening tool and intervene as necessary. Determine patient's food preferences and provide high-protein, high-caloric foods as appropriate.     INTERVENTIONS:  - Monitor oral intake, urinary output, labs, and treatment plans  - Assess nutrition and hydration status and recommend course of action  - Evaluate amount of meals eaten  - Assist patient with eating if necessary   - Allow adequate time for meals  - Recommend/ encourage appropriate diets, oral nutritional supplements, and vitamin/mineral supplements  - Order, calculate, and assess calorie counts as needed  - Recommend,  monitor, and adjust tube feedings and TPN/PPN based on assessed needs  - Assess need for intravenous fluids  - Provide specific nutrition/hydration education as appropriate  - Include patient/family/caregiver in decisions related to nutrition  Outcome: Progressing

## 2024-01-12 NOTE — ASSESSMENT & PLAN NOTE
As evidenced by low ScvO2, cool extremities, and newly depressed/worsening EF on STAT echo 01/11.     01/11 Echo:    Left Ventricle: Left ventricular cavity size is mildly dilated. Wall thickness is normal. There is eccentric hypertrophy. The left ventricular ejection fraction is 32%. Systolic function is severely reduced. There is severe global hypokinesis with specific regional wall abnormalities in the anteroseptal, inferior and apical regions. Diastolic function is abnormal.    Left Atrium: The atrium is moderately dilated.    Mitral Valve: There is mild to moderate regurgitation.    Tricuspid Valve: There is mild to moderate regurgitation. The right ventricular systolic pressure is moderately elevated. The estimated right ventricular systolic pressure is 57.00 mmHg.    Plan:  Trend ScvO2  Continue milrinone and titrate based on clinical exam and ScvO2  Monitor endpoints of resuscitation  Continuous cardiopulmonary monitoring  Cardiac cath plan per interventional cardiology  Continue CRRT for volume management

## 2024-01-12 NOTE — ASSESSMENT & PLAN NOTE
Presented 23 to [facility] with new oxygen requirement, fatigue   Relevant imagin/7/24 CT Chest: Bilateral multilevel airspace consolidation, groundglass opacities, septal thickening and small bilateral pleural effusions secondary to volume overloaded as well as pneumonia.  Initial procalcitonin: 3.16 (24)  Possibly falsely elevated due to ESRD, but had lower levels in the past   Cultures:  24 BCXx2: No growth (24 hours)   23 COVID/Flu/RSV: Negative   23 RP2: Negative   24 MRSA Cx: Negative    24 BCXx2: No growth (x 48 hrs)     Plan:   Current Antibiotics: Cefepime for 5d course  Continue to monitor fever and WBC curve   Follow up cultures

## 2024-01-13 ENCOUNTER — APPOINTMENT (INPATIENT)
Dept: NON INVASIVE DIAGNOSTICS | Facility: HOSPITAL | Age: 54
DRG: 853 | End: 2024-01-13
Payer: MEDICARE

## 2024-01-13 LAB
ALBUMIN SERPL BCP-MCNC: 3.2 G/DL (ref 3.5–5)
ALP SERPL-CCNC: 56 U/L (ref 34–104)
ALT SERPL W P-5'-P-CCNC: 13 U/L (ref 7–52)
ANION GAP SERPL CALCULATED.3IONS-SCNC: 11 MMOL/L
ANION GAP SERPL CALCULATED.3IONS-SCNC: 7 MMOL/L
ANION GAP SERPL CALCULATED.3IONS-SCNC: 8 MMOL/L
ANION GAP SERPL CALCULATED.3IONS-SCNC: 8 MMOL/L
APTT PPP: 46 SECONDS (ref 23–37)
APTT PPP: 47 SECONDS (ref 23–37)
APTT PPP: 58 SECONDS (ref 23–37)
APTT PPP: >210 SECONDS (ref 23–37)
AST SERPL W P-5'-P-CCNC: 19 U/L (ref 13–39)
BACTERIA BLD CULT: NORMAL
BACTERIA BLD CULT: NORMAL
BASE EX.OXY STD BLDV CALC-SCNC: 33.3 % (ref 60–80)
BASE EX.OXY STD BLDV CALC-SCNC: 44.1 % (ref 60–80)
BASE EX.OXY STD BLDV CALC-SCNC: 47.2 % (ref 60–80)
BASE EX.OXY STD BLDV CALC-SCNC: 47.4 % (ref 60–80)
BASE EXCESS BLDV CALC-SCNC: -0.2 MMOL/L
BASE EXCESS BLDV CALC-SCNC: -2.7 MMOL/L
BASE EXCESS BLDV CALC-SCNC: -2.8 MMOL/L
BASE EXCESS BLDV CALC-SCNC: 0.5 MMOL/L
BASOPHILS # BLD AUTO: 0.03 THOUSANDS/ÂΜL (ref 0–0.1)
BASOPHILS NFR BLD AUTO: 0 % (ref 0–1)
BILIRUB DIRECT SERPL-MCNC: 0.24 MG/DL (ref 0–0.2)
BILIRUB SERPL-MCNC: 0.9 MG/DL (ref 0.2–1)
BODY TEMPERATURE: 98.2 DEGREES FEHRENHEIT
BODY TEMPERATURE: 98.4 DEGREES FEHRENHEIT
BUN SERPL-MCNC: 23 MG/DL (ref 5–25)
BUN SERPL-MCNC: 26 MG/DL (ref 5–25)
BUN SERPL-MCNC: 27 MG/DL (ref 5–25)
BUN SERPL-MCNC: 30 MG/DL (ref 5–25)
CA-I BLD-SCNC: 1.09 MMOL/L (ref 1.12–1.32)
CA-I BLD-SCNC: 1.12 MMOL/L (ref 1.12–1.32)
CA-I BLD-SCNC: 1.13 MMOL/L (ref 1.12–1.32)
CA-I BLD-SCNC: 1.14 MMOL/L (ref 1.12–1.32)
CA-I BLD-SCNC: 1.14 MMOL/L (ref 1.12–1.32)
CALCIUM SERPL-MCNC: 8.6 MG/DL (ref 8.4–10.2)
CALCIUM SERPL-MCNC: 8.7 MG/DL (ref 8.4–10.2)
CHLORIDE SERPL-SCNC: 102 MMOL/L (ref 96–108)
CHLORIDE SERPL-SCNC: 102 MMOL/L (ref 96–108)
CHLORIDE SERPL-SCNC: 103 MMOL/L (ref 96–108)
CHLORIDE SERPL-SCNC: 104 MMOL/L (ref 96–108)
CO2 SERPL-SCNC: 21 MMOL/L (ref 21–32)
CO2 SERPL-SCNC: 23 MMOL/L (ref 21–32)
CO2 SERPL-SCNC: 24 MMOL/L (ref 21–32)
CO2 SERPL-SCNC: 24 MMOL/L (ref 21–32)
CREAT SERPL-MCNC: 2.02 MG/DL (ref 0.6–1.3)
CREAT SERPL-MCNC: 2.08 MG/DL (ref 0.6–1.3)
CREAT SERPL-MCNC: 2.27 MG/DL (ref 0.6–1.3)
CREAT SERPL-MCNC: 2.51 MG/DL (ref 0.6–1.3)
EOSINOPHIL # BLD AUTO: 0.15 THOUSAND/ÂΜL (ref 0–0.61)
EOSINOPHIL NFR BLD AUTO: 2 % (ref 0–6)
ERYTHROCYTE [DISTWIDTH] IN BLOOD BY AUTOMATED COUNT: 17 % (ref 11.6–15.1)
ERYTHROCYTE [DISTWIDTH] IN BLOOD BY AUTOMATED COUNT: 17.4 % (ref 11.6–15.1)
GFR SERPL CREATININE-BSD FRML MDRD: 27 ML/MIN/1.73SQ M
GFR SERPL CREATININE-BSD FRML MDRD: 31 ML/MIN/1.73SQ M
GFR SERPL CREATININE-BSD FRML MDRD: 35 ML/MIN/1.73SQ M
GFR SERPL CREATININE-BSD FRML MDRD: 36 ML/MIN/1.73SQ M
GLUCOSE SERPL-MCNC: 135 MG/DL (ref 65–140)
GLUCOSE SERPL-MCNC: 139 MG/DL (ref 65–140)
GLUCOSE SERPL-MCNC: 143 MG/DL (ref 65–140)
GLUCOSE SERPL-MCNC: 143 MG/DL (ref 65–140)
GLUCOSE SERPL-MCNC: 182 MG/DL (ref 65–140)
GLUCOSE SERPL-MCNC: 199 MG/DL (ref 65–140)
GLUCOSE SERPL-MCNC: 205 MG/DL (ref 65–140)
GLUCOSE SERPL-MCNC: 213 MG/DL (ref 65–140)
GLUCOSE SERPL-MCNC: 219 MG/DL (ref 65–140)
HCO3 BLDV-SCNC: 21.3 MMOL/L (ref 24–30)
HCO3 BLDV-SCNC: 21.6 MMOL/L (ref 24–30)
HCO3 BLDV-SCNC: 24 MMOL/L (ref 24–30)
HCO3 BLDV-SCNC: 24.8 MMOL/L (ref 24–30)
HCT VFR BLD AUTO: 15.1 % (ref 36.5–49.3)
HCT VFR BLD AUTO: 21.6 % (ref 36.5–49.3)
HGB BLD-MCNC: 4.9 G/DL (ref 12–17)
HGB BLD-MCNC: 5.3 G/DL (ref 12–17)
HGB BLD-MCNC: 7.1 G/DL (ref 12–17)
IMM GRANULOCYTES # BLD AUTO: 0.06 THOUSAND/UL (ref 0–0.2)
IMM GRANULOCYTES NFR BLD AUTO: 1 % (ref 0–2)
LACTATE SERPL-SCNC: 0.9 MMOL/L (ref 0.5–2)
LYMPHOCYTES # BLD AUTO: 2.47 THOUSANDS/ÂΜL (ref 0.6–4.47)
LYMPHOCYTES NFR BLD AUTO: 28 % (ref 14–44)
MAGNESIUM SERPL-MCNC: 1.9 MG/DL (ref 1.9–2.7)
MAGNESIUM SERPL-MCNC: 2 MG/DL (ref 1.9–2.7)
MAGNESIUM SERPL-MCNC: 2.1 MG/DL (ref 1.9–2.7)
MAGNESIUM SERPL-MCNC: 2.2 MG/DL (ref 1.9–2.7)
MCH RBC QN AUTO: 33.3 PG (ref 26.8–34.3)
MCH RBC QN AUTO: 34 PG (ref 26.8–34.3)
MCHC RBC AUTO-ENTMCNC: 32.5 G/DL (ref 31.4–37.4)
MCHC RBC AUTO-ENTMCNC: 32.9 G/DL (ref 31.4–37.4)
MCV RBC AUTO: 101 FL (ref 82–98)
MCV RBC AUTO: 105 FL (ref 82–98)
MONOCYTES # BLD AUTO: 0.95 THOUSAND/ÂΜL (ref 0.17–1.22)
MONOCYTES NFR BLD AUTO: 11 % (ref 4–12)
NEUTROPHILS # BLD AUTO: 5.27 THOUSANDS/ÂΜL (ref 1.85–7.62)
NEUTS SEG NFR BLD AUTO: 58 % (ref 43–75)
NRBC BLD AUTO-RTO: 0 /100 WBCS
O2 CT BLDV-SCNC: 2.5 ML/DL
O2 CT BLDV-SCNC: 4.9 ML/DL
O2 CT BLDV-SCNC: 5.3 ML/DL
O2 CT BLDV-SCNC: 5.4 ML/DL
PCO2 BLDV: 33.4 MM HG (ref 42–50)
PCO2 BLDV: 33.8 MM HG (ref 42–50)
PCO2 BLDV: 37.4 MM HG (ref 42–50)
PCO2 BLDV: 37.9 MM HG (ref 42–50)
PH BLDV: 7.42 [PH] (ref 7.3–7.4)
PH BLDV: 7.42 [PH] (ref 7.3–7.4)
PH BLDV: 7.43 [PH] (ref 7.3–7.4)
PH BLDV: 7.43 [PH] (ref 7.3–7.4)
PHOSPHATE SERPL-MCNC: 1.8 MG/DL (ref 2.7–4.5)
PHOSPHATE SERPL-MCNC: 2.4 MG/DL (ref 2.7–4.5)
PHOSPHATE SERPL-MCNC: 2.5 MG/DL (ref 2.7–4.5)
PHOSPHATE SERPL-MCNC: 2.7 MG/DL (ref 2.7–4.5)
PLATELET # BLD AUTO: 170 THOUSANDS/UL (ref 149–390)
PLATELET # BLD AUTO: 187 THOUSANDS/UL (ref 149–390)
PMV BLD AUTO: 9.1 FL (ref 8.9–12.7)
PMV BLD AUTO: 9.3 FL (ref 8.9–12.7)
PO2 BLDV: 23.1 MM HG (ref 35–45)
PO2 BLDV: 25.1 MM HG (ref 35–45)
PO2 BLDV: 26.7 MM HG (ref 35–45)
PO2 BLDV: 27.3 MM HG (ref 35–45)
POTASSIUM SERPL-SCNC: 4.1 MMOL/L (ref 3.5–5.3)
POTASSIUM SERPL-SCNC: 4.5 MMOL/L (ref 3.5–5.3)
POTASSIUM SERPL-SCNC: 4.5 MMOL/L (ref 3.5–5.3)
POTASSIUM SERPL-SCNC: 4.7 MMOL/L (ref 3.5–5.3)
PROT SERPL-MCNC: 5.6 G/DL (ref 6.4–8.4)
RBC # BLD AUTO: 1.44 MILLION/UL (ref 3.88–5.62)
RBC # BLD AUTO: 2.13 MILLION/UL (ref 3.88–5.62)
SODIUM SERPL-SCNC: 133 MMOL/L (ref 135–147)
SODIUM SERPL-SCNC: 134 MMOL/L (ref 135–147)
SODIUM SERPL-SCNC: 135 MMOL/L (ref 135–147)
SODIUM SERPL-SCNC: 135 MMOL/L (ref 135–147)
WBC # BLD AUTO: 13.65 THOUSAND/UL (ref 4.31–10.16)
WBC # BLD AUTO: 8.93 THOUSAND/UL (ref 4.31–10.16)

## 2024-01-13 PROCEDURE — 93926 LOWER EXTREMITY STUDY: CPT

## 2024-01-13 PROCEDURE — 99291 CRITICAL CARE FIRST HOUR: CPT | Performed by: PHYSICIAN ASSISTANT

## 2024-01-13 PROCEDURE — 5A1D90Z PERFORMANCE OF URINARY FILTRATION, CONTINUOUS, GREATER THAN 18 HOURS PER DAY: ICD-10-PCS | Performed by: INTERNAL MEDICINE

## 2024-01-13 PROCEDURE — 84100 ASSAY OF PHOSPHORUS: CPT | Performed by: PHYSICIAN ASSISTANT

## 2024-01-13 PROCEDURE — 83735 ASSAY OF MAGNESIUM: CPT | Performed by: PHYSICIAN ASSISTANT

## 2024-01-13 PROCEDURE — 80048 BASIC METABOLIC PNL TOTAL CA: CPT | Performed by: PHYSICIAN ASSISTANT

## 2024-01-13 PROCEDURE — 82330 ASSAY OF CALCIUM: CPT | Performed by: PHYSICIAN ASSISTANT

## 2024-01-13 PROCEDURE — 80076 HEPATIC FUNCTION PANEL: CPT | Performed by: INTERNAL MEDICINE

## 2024-01-13 PROCEDURE — P9058 RBC, L/R, CMV-NEG, IRRAD: HCPCS

## 2024-01-13 PROCEDURE — 99232 SBSQ HOSP IP/OBS MODERATE 35: CPT | Performed by: INTERNAL MEDICINE

## 2024-01-13 PROCEDURE — 90945 DIALYSIS ONE EVALUATION: CPT

## 2024-01-13 PROCEDURE — 85025 COMPLETE CBC W/AUTO DIFF WBC: CPT | Performed by: INTERNAL MEDICINE

## 2024-01-13 PROCEDURE — 82805 BLOOD GASES W/O2 SATURATION: CPT | Performed by: PHYSICIAN ASSISTANT

## 2024-01-13 PROCEDURE — 90945 DIALYSIS ONE EVALUATION: CPT | Performed by: INTERNAL MEDICINE

## 2024-01-13 PROCEDURE — 85730 THROMBOPLASTIN TIME PARTIAL: CPT | Performed by: INTERNAL MEDICINE

## 2024-01-13 PROCEDURE — 82805 BLOOD GASES W/O2 SATURATION: CPT

## 2024-01-13 PROCEDURE — 99233 SBSQ HOSP IP/OBS HIGH 50: CPT | Performed by: INTERNAL MEDICINE

## 2024-01-13 PROCEDURE — 85730 THROMBOPLASTIN TIME PARTIAL: CPT | Performed by: PHYSICIAN ASSISTANT

## 2024-01-13 PROCEDURE — 80048 BASIC METABOLIC PNL TOTAL CA: CPT

## 2024-01-13 PROCEDURE — 85027 COMPLETE CBC AUTOMATED: CPT | Performed by: PHYSICIAN ASSISTANT

## 2024-01-13 PROCEDURE — 85018 HEMOGLOBIN: CPT | Performed by: PHYSICIAN ASSISTANT

## 2024-01-13 PROCEDURE — 82330 ASSAY OF CALCIUM: CPT

## 2024-01-13 PROCEDURE — 83735 ASSAY OF MAGNESIUM: CPT

## 2024-01-13 PROCEDURE — 83605 ASSAY OF LACTIC ACID: CPT | Performed by: PHYSICIAN ASSISTANT

## 2024-01-13 PROCEDURE — 84100 ASSAY OF PHOSPHORUS: CPT

## 2024-01-13 PROCEDURE — 82948 REAGENT STRIP/BLOOD GLUCOSE: CPT

## 2024-01-13 PROCEDURE — 93979 VASCULAR STUDY: CPT

## 2024-01-13 RX ORDER — ALBUMIN, HUMAN INJ 5% 5 %
12.5 SOLUTION INTRAVENOUS ONCE
Status: COMPLETED | OUTPATIENT
Start: 2024-01-13 | End: 2024-01-13

## 2024-01-13 RX ORDER — CALCIUM GLUCONATE 20 MG/ML
2 INJECTION, SOLUTION INTRAVENOUS ONCE
Status: COMPLETED | OUTPATIENT
Start: 2024-01-13 | End: 2024-01-13

## 2024-01-13 RX ORDER — CALCIUM GLUCONATE 20 MG/ML
2 INJECTION, SOLUTION INTRAVENOUS ONCE
Status: DISCONTINUED | OUTPATIENT
Start: 2024-01-13 | End: 2024-01-13

## 2024-01-13 RX ORDER — CALCIUM GLUCONATE 20 MG/ML
1 INJECTION, SOLUTION INTRAVENOUS ONCE
Status: COMPLETED | OUTPATIENT
Start: 2024-01-13 | End: 2024-01-13

## 2024-01-13 RX ORDER — ACETAMINOPHEN 325 MG/1
650 TABLET ORAL EVERY 6 HOURS PRN
Status: DISCONTINUED | OUTPATIENT
Start: 2024-01-13 | End: 2024-01-20 | Stop reason: HOSPADM

## 2024-01-13 RX ORDER — MAGNESIUM SULFATE 1 G/100ML
1 INJECTION INTRAVENOUS ONCE
Qty: 100 ML | Refills: 0 | Status: COMPLETED | OUTPATIENT
Start: 2024-01-13 | End: 2024-01-13

## 2024-01-13 RX ADMIN — CALCIUM GLUCONATE 1 G: 20 INJECTION, SOLUTION INTRAVENOUS at 10:00

## 2024-01-13 RX ADMIN — ALBUMIN (HUMAN) 12.5 G: 12.5 INJECTION, SOLUTION INTRAVENOUS at 23:01

## 2024-01-13 RX ADMIN — CALCIUM GLUCONATE 1 G: 20 INJECTION, SOLUTION INTRAVENOUS at 03:01

## 2024-01-13 RX ADMIN — INSULIN GLARGINE 4 UNITS: 100 INJECTION, SOLUTION SUBCUTANEOUS at 21:07

## 2024-01-13 RX ADMIN — MAGNESIUM SULFATE HEPTAHYDRATE 1 G: 1 INJECTION, SOLUTION INTRAVENOUS at 12:00

## 2024-01-13 RX ADMIN — AMIODARONE HYDROCHLORIDE 0.5 MG/MIN: 50 INJECTION, SOLUTION INTRAVENOUS at 18:00

## 2024-01-13 RX ADMIN — SODIUM PHOSPHATE, MONOBASIC, MONOHYDRATE AND SODIUM PHOSPHATE, DIBASIC, ANHYDROUS 6 MMOL: 142; 276 INJECTION, SOLUTION INTRAVENOUS at 03:57

## 2024-01-13 RX ADMIN — INSULIN GLARGINE 5 UNITS: 100 INJECTION, SOLUTION SUBCUTANEOUS at 08:04

## 2024-01-13 RX ADMIN — CALCITRIOL 0.5 MCG: 0.25 CAPSULE, LIQUID FILLED ORAL at 08:03

## 2024-01-13 RX ADMIN — TACROLIMUS 1 MG: 1 CAPSULE ORAL at 17:00

## 2024-01-13 RX ADMIN — SODIUM PHOSPHATE, MONOBASIC, MONOHYDRATE AND SODIUM PHOSPHATE, DIBASIC, ANHYDROUS 6 MMOL: 142; 276 INJECTION, SOLUTION INTRAVENOUS at 18:00

## 2024-01-13 RX ADMIN — Medication 20000 ML: at 02:24

## 2024-01-13 RX ADMIN — ASPIRIN 81 MG CHEWABLE TABLET 81 MG: 81 TABLET CHEWABLE at 08:03

## 2024-01-13 RX ADMIN — HEPARIN SODIUM 9 UNITS/KG/HR: 10000 INJECTION, SOLUTION INTRAVENOUS at 05:24

## 2024-01-13 RX ADMIN — CINACALCET 30 MG: 30 TABLET, FILM COATED ORAL at 08:03

## 2024-01-13 RX ADMIN — MILRINONE LACTATE IN DEXTROSE 0.13 MCG/KG/MIN: 200 INJECTION, SOLUTION INTRAVENOUS at 21:46

## 2024-01-13 RX ADMIN — AMIODARONE HYDROCHLORIDE 200 MG: 200 TABLET ORAL at 17:00

## 2024-01-13 RX ADMIN — CALCIUM GLUCONATE 2 G: 20 INJECTION, SOLUTION INTRAVENOUS at 16:23

## 2024-01-13 RX ADMIN — Medication 20000 ML: at 14:30

## 2024-01-13 RX ADMIN — CEFEPIME 1000 MG: 1 INJECTION, POWDER, FOR SOLUTION INTRAMUSCULAR; INTRAVENOUS at 22:07

## 2024-01-13 RX ADMIN — ATORVASTATIN CALCIUM 80 MG: 80 TABLET, FILM COATED ORAL at 17:00

## 2024-01-13 RX ADMIN — HEPARIN SODIUM 1500 UNITS: 1000 INJECTION INTRAVENOUS; SUBCUTANEOUS at 17:00

## 2024-01-13 RX ADMIN — INSULIN LISPRO 5 UNITS: 100 INJECTION, SOLUTION INTRAVENOUS; SUBCUTANEOUS at 13:53

## 2024-01-13 RX ADMIN — SODIUM PHOSPHATE, MONOBASIC, MONOHYDRATE AND SODIUM PHOSPHATE, DIBASIC, ANHYDROUS 9 MMOL: 142; 276 INJECTION, SOLUTION INTRAVENOUS at 11:50

## 2024-01-13 RX ADMIN — AMIODARONE HYDROCHLORIDE 200 MG: 200 TABLET ORAL at 08:03

## 2024-01-13 RX ADMIN — INSULIN LISPRO 1 UNITS: 100 INJECTION, SOLUTION INTRAVENOUS; SUBCUTANEOUS at 07:33

## 2024-01-13 RX ADMIN — PREDNISONE 5 MG: 5 TABLET ORAL at 08:03

## 2024-01-13 RX ADMIN — AMIODARONE HYDROCHLORIDE 200 MG: 200 TABLET ORAL at 12:16

## 2024-01-13 RX ADMIN — CEFEPIME 1000 MG: 1 INJECTION, POWDER, FOR SOLUTION INTRAMUSCULAR; INTRAVENOUS at 10:50

## 2024-01-13 RX ADMIN — TACROLIMUS 2 MG: 1 CAPSULE ORAL at 08:04

## 2024-01-13 RX ADMIN — ACETAMINOPHEN 650 MG: 325 TABLET, FILM COATED ORAL at 08:11

## 2024-01-13 RX ADMIN — HEPARIN SODIUM 1500 UNITS: 1000 INJECTION INTRAVENOUS; SUBCUTANEOUS at 10:00

## 2024-01-13 NOTE — ASSESSMENT & PLAN NOTE
Dialysis regimen: Tuesday, Thursday, Saturday via right arm AV fistula   1/8/24: Right IJ temporary HD catheter placement   1/8/24: CRRT initiated   1/9/24: Tolerated -100 to -150 cc/hr volume removal   Re-initiation of CRRT 1/11    Plan:   CRRT re-initiated 1/11 - continue negative as tolerated  Cinacalcet 30mg daily   Nephrology consulted, appreciate recommendations-possible transition back to IHD on Monday, pending hemodynamics

## 2024-01-13 NOTE — ASSESSMENT & PLAN NOTE
As evidenced by low ScvO2, cool extremities, and newly depressed/worsening EF on STAT echo 01/11.     01/11 Echo:    Left Ventricle: Left ventricular cavity size is mildly dilated. Wall thickness is normal. There is eccentric hypertrophy. The left ventricular ejection fraction is 32%. Systolic function is severely reduced. There is severe global hypokinesis with specific regional wall abnormalities in the anteroseptal, inferior and apical regions. Diastolic function is abnormal.    Left Atrium: The atrium is moderately dilated.    Mitral Valve: There is mild to moderate regurgitation.    Tricuspid Valve: There is mild to moderate regurgitation. The right ventricular systolic pressure is moderately elevated. The estimated right ventricular systolic pressure is 57.00 mmHg.    Plan:  Trend ScvO2  Continue milrinone and titrate based on clinical exam and ScvO2  Monitor endpoints of resuscitation  Continuous cardiopulmonary monitoring  Continue CRRT for volume management

## 2024-01-13 NOTE — ASSESSMENT & PLAN NOTE
Presented to [facility] ED with shortness of breath. Troponin and EKG checked as part of initial workup. EKG without acute ischemic changes   NSTEMI likely related to increased demand from acute hypoxic respiratory failure and septic shock   Troponin 22607 > 96764 > 54891   Cardiac Cath 1/12:Severe in-stent restenosis bifurcation ostial LAD/LCx,  RCA, elevated LVEDP, porcelain aorta.    Plan:  ASA 81mg daily   Atorvastatin 80mg qHS   Continue holding home afterload reduction agents/AV aubrie blockers given resolving septic shock:   Carvedilol 6.25mg BID  Isosorbide mononitrate 30mg q24h   Metoprolol succinate 50mg q24  Nifedipine 30mg q24 hours   Cardiology consulted, appreciate recommendations- ?  High risk PCI  CT Surgery Consulted-recommendations for supportive care or high risk PCI

## 2024-01-13 NOTE — ASSESSMENT & PLAN NOTE
Presented 23 to [facility] with new oxygen requirement, fatigue   Relevant imagin/7/24 CT Chest: Bilateral multilevel airspace consolidation, groundglass opacities, septal thickening and small bilateral pleural effusions secondary to volume overloaded as well as pneumonia.  Initial procalcitonin: 3.16 (24)  Possibly falsely elevated due to ESRD, but had lower levels in the past   Cultures:  24 BCXx2: No growth  23 COVID/Flu/RSV: Negative   23 RP2: Negative   24 MRSA Cx: Negative    24 BCXx2: No growth (x 4 days)     Plan:   Current Antibiotics: Cefepime for 5d course  Continue to monitor fever and WBC curve   Follow up cultures

## 2024-01-13 NOTE — PROGRESS NOTES
"Cardiology Progress Note - Berto Faria 53 y.o. male MRN: [id number]    Unit/Bed#: [facility] 515-01 Encounter: [id number]      Assessment:  Shock - septic/cardiogenic   Paroxysmal atrial fibrillation   Acute HFrEF  Multifocal pneumonia   Cardiomyopathy - ischemic - EF 30%  Acute respiratory failure with hypoxia  Type 2 myocardial infarction secondary to #2/4  ESRD on HD - current on CRRT  Renal transplant failure/rejection   Right AKA secondary to osteomyelitis   Type 1 diabetes mellitus    Plan:  Cath yesterday with in-stent restenosis of left circumflex and LAD stents with  of RCA  Turndown for CABG per CT surgery  Maintaining normal sinus rhythm on IV amiodarone, oral started yesterday  Continue IV amiodarone loading for next 24 hours  Remains on IV heparin - eventual DOAC  Remains pressor/inotrope dependent - VBG noted, wean as able  Discussed with interventionalist yesterday, recommended medical management - will readdress high risk PCI with interventional team  CRRT per nephrology      Subjective:   Patient seen and examined.  No significant events overnight.  Resting comfortably without complaints.    Objective:     Vitals: Blood pressure 103/52, pulse 89, temperature 98.4 °F (36.9 °C), resp. rate (!) 31, height 5' 4\" (1.626 m), weight 46.6 kg (102 lb 11.8 oz), SpO2 96%., Body mass index is 17.63 kg/m².,   Orthostatic Blood Pressures      Flowsheet Row Most Recent Value   Blood Pressure 103/52 filed at 01/13/2024 0800   Patient Position - Orthostatic VS Lying filed at 01/12/2024 1600              Intake/Output Summary (Last 24 hours) at 1/13/2024 0910  Last data filed at 1/13/2024 0800  Gross per 24 hour   Intake 1317 ml   Output 2873 ml   Net -1556 ml           Physical Exam:    GEN: Berto Faria appears well, alert and oriented x 3, pleasant and cooperative   HEENT: pupils equal, round, and reactive to light; extraocular muscles intact  NECK: supple, no carotid bruits   HEART: regular rhythm, normal " S1 and S2, no murmur  LUNGS: Decreased breath sounds bilateral bases  ABDOMEN: normal bowel sounds, soft, no tenderness, no distention  EXTREMITIES: Right AKA, left lower extremity warm with no edema  NEURO: no focal findings   SKIN: normal without suspicious lesions on exposed skin    Medications:      Current Facility-Administered Medications:     acetaminophen (TYLENOL) tablet 650 mg, 650 mg, Oral, Q6H PRN, Rosalva Taylor PA-C, 650 mg at 24 0811    albuterol (PROVENTIL HFA,VENTOLIN HFA) inhaler 2 puff, 2 puff, Inhalation, Q4H PRN, Katie Rose Mariecsek, CRNP    [] amiodarone (CORDARONE) 900 mg in dextrose 5 % 500 mL infusion, 1 mg/min, Intravenous, Continuous, Stopped at 24 1711 **FOLLOWED BY** amiodarone (CORDARONE) 900 mg in dextrose 5 % 500 mL infusion, 0.5 mg/min, Intravenous, Continuous, Katie Grecsek, CRNP, Last Rate: 16.7 mL/hr at 24 1247, 0.5 mg/min at 24 1247    amiodarone tablet 200 mg, 200 mg, Oral, TID With Meals, Katie Grecsek, CRNP, 200 mg at 24 0803    aspirin chewable tablet 81 mg, 81 mg, Oral, QAM, Katie Grecsek, CRNP, 81 mg at 24 0803    atorvastatin (LIPITOR) tablet 80 mg, 80 mg, Oral, Daily With Dinner, Katie Grecsek, CRNP, 80 mg at 24 1700    calcitriol (ROCALTROL) capsule 0.5 mcg, 0.5 mcg, Oral, Once per day on , Katie Grecsek, CRNP, 0.5 mcg at 24 0803    cefepime (MAXIPIME) 1,000 mg in dextrose 5 % 50 mL IVPB, 1,000 mg, Intravenous, Q12H, Katie Grecsek, CRNP, Stopped at 24 0100    cinacalcet (SENSIPAR) tablet 30 mg, 30 mg, Oral, Daily, Katie Grecsek, CRNP, 30 mg at 24 0803    glycerin-hypromellose- (ARTIFICIAL TEARS) ophthalmic solution 1 drop, 1 drop, Right Eye, Q4H PRN, GAVINO Baker    heparin (porcine) 25,000 units in 0.45% NaCl 250 mL infusion (premix), 3-20 Units/kg/hr (Order-Specific), Intravenous, Titrated, BERLIN BakerNP, Last Rate: 4.5 mL/hr at 24  0524, 9 Units/kg/hr at 01/13/24 0524    heparin (porcine) injection 1,500 Units, 1,500 Units, Intravenous, Q6H PRN, Katie Grecsek, CRNP    heparin (porcine) injection 3,000 Units, 3,000 Units, Intravenous, Q6H PRN, Katie Grecsek, CRNP    insulin glargine (LANTUS) subcutaneous injection 10 Units 0.1 mL, 10 Units, Subcutaneous, Q12H JAMARCUS, Katie Rose Mariecsek, CRNP, 5 Units at 01/13/24 0804    insulin lispro (HumaLOG) 100 units/mL subcutaneous injection 1-6 Units, 1-6 Units, Subcutaneous, HS, Katie Rose Mariecsek, CRNP, 2 Units at 01/08/24 2108    insulin lispro (HumaLOG) 100 units/mL subcutaneous injection 1-6 Units, 1-6 Units, Subcutaneous, TID With Meals, 2 Units at 01/12/24 1811 **AND** Fingerstick Glucose (POCT), , , TID AC, Katie Rose Mariecsek, CRNP    insulin lispro (HumaLOG) 100 units/mL subcutaneous injection 8 Units, 8 Units, Subcutaneous, TID With Meals, Katie Rose Mariecsek, CRNP, 1 Units at 01/13/24 0733    milrinone (PRIMACOR) 20 mg in 100 mL infusion (premix), 0.25 mcg/kg/min, Intravenous, Continuous, Andre Mendoza PA-C, Last Rate: 3.8 mL/hr at 01/13/24 0521, 0.25 mcg/kg/min at 01/13/24 0521    norepinephrine (LEVOPHED) 4 mg (STANDARD CONCENTRATION) IV in sodium chloride 0.9% 250 mL, 1-30 mcg/min, Intravenous, Titrated, Katie Rose Mariecsek, CRNP, Last Rate: 7.5 mL/hr at 01/13/24 0908, 2 mcg/min at 01/13/24 0908    NxStage K 4/Ca 3 dialysis solution (RFP-401) 20,000 mL, 20,000 mL, Dialysis, Continuous, Wendy K Doug, DO, 20,000 mL at 01/13/24 0224    ondansetron (ZOFRAN) injection 4 mg, 4 mg, Intravenous, Q8H PRN, Katie Velasco, BERLINNP, 4 mg at 01/08/24 1347    pantoprazole (PROTONIX) EC tablet 40 mg, 40 mg, Oral, Early Morning, Katie Velasco, CRNP, 40 mg at 01/12/24 0505    predniSONE tablet 5 mg, 5 mg, Oral, Daily, Katie Velasco, CRNP, 5 mg at 01/13/24 0803    tacrolimus (PROGRAF) capsule 1 mg, 1 mg, Oral, QPM, Katie Velasco, CRNP, 1 mg at 01/12/24 1859    tacrolimus (PROGRAF) capsule 2 mg, 2 mg, Oral, QAM,  Katie Velasco, GAVINO, 2 mg at 01/13/24 0804     Labs & Results:        Results from last 7 days   Lab Units 01/13/24  0223 01/12/24  1209 01/12/24  0431 01/11/24  1354   WBC Thousand/uL 8.93  --  10.09 12.97*   HEMOGLOBIN g/dL 7.1* 7.8* 7.8* 9.3*   HEMATOCRIT % 21.6*  --  22.9* 28.3*   PLATELETS Thousands/uL 170  --  198 237         Results from last 7 days   Lab Units 01/13/24  0243 01/13/24 0212 01/12/24 2039 01/12/24  1452 01/12/24  0844 01/12/24  0431 01/08/24  0429 01/07/24  1015   POTASSIUM mmol/L  --  4.7 4.8 5.0   < >  --    < > 3.5   CHLORIDE mmol/L  --  102 101 100   < >  --    < > 96   CO2 mmol/L  --  21 20* 16*   < >  --    < > 27   BUN mg/dL  --  30* 37* 40*   < >  --    < > 30*   CREATININE mg/dL  --  2.51* 2.87* 3.30*   < >  --    < > 5.36*   CALCIUM mg/dL  --  8.7 8.3* 8.5   < >  --    < > 9.3   ALK PHOS U/L 56  --   --   --   --  57  --  98   ALT U/L 13  --   --   --   --  12  --  19   AST U/L 19  --   --   --   --  18  --  27    < > = values in this interval not displayed.     Results from last 7 days   Lab Units 01/13/24  0012 01/12/24  0232 01/11/24  2005 01/11/24  1354 01/07/24  1906 01/07/24  1015   INR   --  1.23*  --  1.17  --  1.00   PTT seconds >210* 74* 89* 45*   < > 37    < > = values in this interval not displayed.     Results from last 7 days   Lab Units 01/13/24 0212 01/12/24 2039 01/12/24  1452   MAGNESIUM mg/dL 2.1 2.0 2.2     Counseling / Coordination of Care  Total floor / unit time spent today 25 minutes.  Greater than 50% of total time was spent with the patient and / or family counseling and / or coordination of care.

## 2024-01-13 NOTE — PLAN OF CARE
Problem: Prexisting or High Potential for Compromised Skin Integrity  Goal: Skin integrity is maintained or improved  Description: INTERVENTIONS:  - Identify patients at risk for skin breakdown  - Assess and monitor skin integrity  - Assess and monitor nutrition and hydration status  - Monitor labs   - Assess for incontinence   - Turn and reposition patient  - Assist with mobility/ambulation  - Relieve pressure over bony prominences  - Avoid friction and shearing  - Provide appropriate hygiene as needed including keeping skin clean and dry  - Evaluate need for skin moisturizer/barrier cream  - Collaborate with interdisciplinary team   - Patient/family teaching  - Consider wound care consult   Outcome: Progressing     Problem: PAIN - ADULT  Goal: Verbalizes/displays adequate comfort level or baseline comfort level  Description: Interventions:  - Encourage patient to monitor pain and request assistance  - Assess pain using appropriate pain scale  - Administer analgesics based on type and severity of pain and evaluate response  - Implement non-pharmacological measures as appropriate and evaluate response  - Consider cultural and social influences on pain and pain management  - Notify physician/advanced practitioner if interventions unsuccessful or patient reports new pain  Outcome: Progressing     Problem: INFECTION - ADULT  Goal: Absence or prevention of progression during hospitalization  Description: INTERVENTIONS:  - Assess and monitor for signs and symptoms of infection  - Monitor lab/diagnostic results  - Monitor all insertion sites, i.e. indwelling lines, tubes, and drains  - Monitor endotracheal if appropriate and nasal secretions for changes in amount and color  - [place] appropriate cooling/warming therapies per order  - Administer medications as ordered  - Instruct and encourage patient and family to use good hand hygiene technique  - Identify and instruct in appropriate isolation precautions for  identified infection/condition  Outcome: Progressing  Goal: Absence of fever/infection during neutropenic period  Description: INTERVENTIONS:  - Monitor WBC    Outcome: Progressing     Problem: SAFETY ADULT  Goal: Patient will remain free of falls  Description: INTERVENTIONS:  - Educate patient/family on patient safety including physical limitations  - Instruct patient to call for assistance with activity   - Consult OT/PT to assist with strengthening/mobility   - Keep Call bell within reach  - Keep bed low and locked with side rails adjusted as appropriate  - Keep care items and personal belongings within reach  - Initiate and maintain comfort rounds  - Make Fall Risk Sign visible to staff  - Apply yellow socks and bracelet for high fall risk patients  - Consider moving patient to room near nurses station  Outcome: Progressing  Goal: Maintain or return to baseline ADL function  Description: INTERVENTIONS:  -  Assess patient's ability to carry out ADLs; assess patient's baseline for ADL function and identify physical deficits which impact ability to perform ADLs (bathing, care of mouth/teeth, toileting, grooming, dressing, etc.)  - Assess/evaluate cause of self-care deficits   - Assess range of motion  - Assess patient's mobility; develop plan if impaired  - Assess patient's need for assistive devices and provide as appropriate  - Encourage maximum independence but intervene and supervise when necessary  - Involve family in performance of ADLs  - Assess for home care needs following discharge   - Consider OT consult to assist with ADL evaluation and planning for discharge  - Provide patient education as appropriate  Outcome: Progressing  Goal: Maintains/Returns to pre admission functional level  Description: INTERVENTIONS:  - Perform AM-PAC 6 Click Basic Mobility/ Daily Activity assessment daily.  - Set and communicate daily mobility goal to care team and patient/family/caregiver.   - Collaborate with rehabilitation  services on mobility goals if consulted    Problem: DISCHARGE PLANNING  Goal: Discharge to home or other facility with appropriate resources  Description: INTERVENTIONS:  - Identify barriers to discharge w/patient and caregiver  - Arrange for needed discharge resources and transportation as appropriate  - Identify discharge learning needs (meds, wound care, etc.)  - Arrange for interpretive services to assist at discharge as needed  - Refer to Case Management Department for coordinating discharge planning if the patient needs post-hospital services based on physician/advanced practitioner order or complex needs related to functional status, cognitive ability, or social support system  Outcome: Progressing     Problem: Knowledge Deficit  Goal: Patient/family/caregiver demonstrates understanding of disease process, treatment plan, medications, and discharge instructions  Description: Complete learning assessment and assess knowledge base.  Interventions:  - Provide teaching at level of understanding  - Provide teaching via preferred learning methods  Outcome: Progressing     Problem: NEUROSENSORY - ADULT  Goal: Achieves stable or improved neurological status  Description: INTERVENTIONS  - Monitor and report changes in neurological status  - Monitor vital signs such as temperature, blood pressure, glucose, and any other labs ordered   - Initiate measures to prevent increased intracranial pressure  - Monitor for seizure activity and implement precautions if appropriate      Outcome: Progressing  Goal: Remains free of injury related to seizures activity  Description: INTERVENTIONS  - Maintain airway, patient safety  and administer oxygen as ordered  - Monitor patient for seizure activity, document and report duration and description of seizure to physician/advanced practitioner  - If seizure occurs,  ensure patient safety during seizure  - Reorient patient post seizure  - Seizure pads on all 4 side rails  - Instruct  patient/family to notify RN of any seizure activity including if an aura is experienced  - Instruct patient/family to call for assistance with activity based on nursing assessment  - Administer anti-seizure medications if ordered    Outcome: Progressing  Goal: Achieves maximal functionality and self care  Description: INTERVENTIONS  - Monitor swallowing and airway patency with patient fatigue and changes in neurological status  - Encourage and assist patient to increase activity and self care.   - Encourage visually impaired, hearing impaired and aphasic patients to use assistive/communication devices  Outcome: Progressing     Problem: CARDIOVASCULAR - ADULT  Goal: Maintains optimal cardiac output and hemodynamic stability  Description: INTERVENTIONS:  - Monitor I/O, vital signs and rhythm  - Monitor for S/S and trends of decreased cardiac output  - Administer and titrate ordered vasoactive medications to optimize hemodynamic stability  - Assess quality of pulses, skin color and temperature  - Assess for signs of decreased coronary artery perfusion  - Instruct patient to report change in severity of symptoms  Outcome: Progressing  Goal: Absence of cardiac dysrhythmias or at baseline rhythm  Description: INTERVENTIONS:  - Continuous cardiac monitoring, vital signs, obtain 12 lead EKG if ordered  - Administer antiarrhythmic and heart rate control medications as ordered  - Monitor electrolytes and administer replacement therapy as ordered  Outcome: Progressing     Problem: RESPIRATORY - ADULT  Goal: Achieves optimal ventilation and oxygenation  Description: INTERVENTIONS:  - Assess for changes in respiratory status  - Assess for changes in mentation and behavior  - Position to facilitate oxygenation and minimize respiratory effort  - Oxygen administered by appropriate delivery if ordered  - Initiate smoking cessation education as indicated  - Encourage broncho-pulmonary hygiene including cough, deep breathe, Incentive  Spirometry  - Assess the need for suctioning and aspirate as needed  - Assess and instruct to report SOB or any respiratory difficulty  - Respiratory Therapy support as indicated  Outcome: Progressing     Problem: GASTROINTESTINAL - ADULT  Goal: Minimal or absence of nausea and/or vomiting  Description: INTERVENTIONS:  - Administer IV fluids if ordered to ensure adequate hydration  - Maintain NPO status until nausea and vomiting are resolved  - Nasogastric tube if ordered  - Administer ordered antiemetic medications as needed  - Provide nonpharmacologic comfort measures as appropriate  - Advance diet as tolerated, if ordered  - Consider nutrition services referral to assist patient with adequate nutrition and appropriate food choices  Outcome: Progressing  Goal: Maintains or returns to baseline bowel function  Description: INTERVENTIONS:  - Assess bowel function  - Encourage oral fluids to ensure adequate hydration  - Administer IV fluids if ordered to ensure adequate hydration  - Administer ordered medications as needed  - Encourage mobilization and activity  - Consider nutritional services referral to assist patient with adequate nutrition and appropriate food choices  Outcome: Progressing  Goal: Maintains adequate nutritional intake  Description: INTERVENTIONS:  - Monitor percentage of each meal consumed  - Identify factors contributing to decreased intake, treat as appropriate  - Assist with meals as needed  - Monitor I&O, weight, and lab values if indicated  - Obtain nutrition services referral as needed  Outcome: Progressing  Goal: Establish and maintain optimal ostomy function  Description: INTERVENTIONS:  - Assess bowel function  - Encourage oral fluids to ensure adequate hydration  - Administer IV fluids if ordered to ensure adequate hydration   - Administer ordered medications as needed  - Encourage mobilization and activity  - Nutrition services referral to assist patient with appropriate food choices  -  Assess stoma site  - Consider wound care consult   Outcome: Progressing  Goal: Oral mucous membranes remain intact  Description: INTERVENTIONS  - Assess oral mucosa and hygiene practices  - Implement preventative oral hygiene regimen  - Implement oral medicated treatments as ordered  - Initiate Nutrition services referral as needed  Outcome: Progressing     Problem: GENITOURINARY - ADULT  Goal: Maintains or returns to baseline urinary function  Description: INTERVENTIONS:  - Assess urinary function  - Encourage oral fluids to ensure adequate hydration if ordered  - Administer IV fluids as ordered to ensure adequate hydration  - Administer ordered medications as needed  - Offer frequent toileting  - Follow urinary retention protocol if ordered  Outcome: Progressing  Goal: Absence of urinary retention  Description: INTERVENTIONS:  - Assess patient’s ability to void and empty bladder  - Monitor I/O  - Bladder scan as needed  - Discuss with physician/AP medications to alleviate retention as needed  - Discuss catheterization for long term situations as appropriate  Outcome: Progressing  Goal: Urinary catheter remains patent  Description: INTERVENTIONS:  - Assess patency of urinary catheter  - If patient has a chronic krishnan, consider changing catheter if non-functioning  - Follow guidelines for intermittent irrigation of non-functioning urinary catheter  Outcome: Progressing     Problem: METABOLIC, FLUID AND ELECTROLYTES - ADULT  Goal: Electrolytes maintained within normal limits  Description: INTERVENTIONS:  - Monitor labs and assess patient for signs and symptoms of electrolyte imbalances  - Administer electrolyte replacement as ordered  - Monitor response to electrolyte replacements, including repeat lab results as appropriate  - Instruct patient on fluid and nutrition as appropriate  Outcome: Progressing  Goal: Fluid balance maintained  Description: INTERVENTIONS:  - Monitor labs   - Monitor I/O and WT  - Instruct  patient on fluid and nutrition as appropriate  - Assess for signs & symptoms of volume excess or deficit  Outcome: Progressing  Goal: Glucose maintained within target range  Description: INTERVENTIONS:  - Monitor Blood Glucose as ordered  - Assess for signs and symptoms of hyperglycemia and hypoglycemia  - Administer ordered medications to maintain glucose within target range  - Assess nutritional intake and initiate nutrition service referral as needed  Outcome: Progressing     Problem: SKIN/TISSUE INTEGRITY - ADULT  Goal: Skin Integrity remains intact(Skin Breakdown Prevention)  Description: Assess:  -Inspect skin when repositioning, toileting, and assisting with ADLS  -Assess extremities for adequate circulation and sensation     Bed Management:  -Have minimal linens on bed & keep smooth, unwrinkled  -Change linens as needed when moist or perspiring    Toileting:  -Offer bedside commode    Activity:  -Encourage activity and walks on unit  -Encourage or provide ROM exercises   Skin Care:  -Avoid use of baby powder, tape, friction and shearing, hot water or constrictive clothing  -Do not massage red bony areas    Outcome: Progressing  Goal: Incision(s), wounds(s) or drain site(s) healing without S/S of infection  Description: INTERVENTIONS  - Assess and document dressing, incision, wound bed, drain sites and surrounding tissue  - Provide patient and family education  Outcome: Progressing  Goal: Pressure injury heals and does not worsen  Description: Interventions:  - Implement low air loss mattress or specialty surface (Criteria met)  - Apply silicone foam dressing  - Consider nutrition services referral as needed  Outcome: Progressing     Problem: HEMATOLOGIC - ADULT  Goal: Maintains hematologic stability  Description: INTERVENTIONS  - Assess for signs and symptoms of bleeding or hemorrhage  - Monitor labs  - Administer supportive blood products/factors as ordered and appropriate  Outcome: Progressing     Problem:  Nutrition/Hydration-ADULT  Goal: Nutrient/Hydration intake appropriate for improving, restoring or maintaining nutritional needs  Description: Monitor and assess patient's nutrition/hydration status for malnutrition. Collaborate with interdisciplinary team and initiate plan and interventions as ordered.  Monitor patient's weight and dietary intake as ordered or per policy. Utilize nutrition screening tool and intervene as necessary. Determine patient's food preferences and provide high-protein, high-caloric foods as appropriate.     INTERVENTIONS:  - Monitor oral intake, urinary output, labs, and treatment plans  - Assess nutrition and hydration status and recommend course of action  - Evaluate amount of meals eaten  - Assist patient with eating if necessary   - Allow adequate time for meals  - Recommend/ encourage appropriate diets, oral nutritional supplements, and vitamin/mineral supplements  - Order, calculate, and assess calorie counts as needed  - Recommend, monitor, and adjust tube feedings and TPN/PPN based on assessed needs  - Assess need for intravenous fluids  - Provide specific nutrition/hydration education as appropriate  - Include patient/family/caregiver in decisions related to nutrition  Outcome: Progressing   - Out of bed for toileting  - Record patient progress and toleration of activity level   Outcome: Progressing

## 2024-01-13 NOTE — ASSESSMENT & PLAN NOTE
Likely related to NSTEMI from septic shock and element of stress cardiomyopathy, but cannot rule about possible ischemic element given regional wall changes   1/7/24 TTE: LV cavity size is normal. Wall thickness is mildly increased. LVEF 40%. Systolic function is moderately reduced. Following segments are hypokinetic: mid anteroseptal, mid inferior, apical anterior, apical septal, apical inferior and apex.RV normal. Mild MR. Mild TR    Plan:   Milrinone for cardiogenic shock as above  Volume removal with CRRT  Daily weights  Continue holding home afterload reduction agents/AV aubrie blockers given resolving septic shock:   Carvedilol 6.25mg BID  Isosorbide mononitrate 30mg q24h   Metoprolol succinate 50mg q24  Nifedipine 30mg q24 hours   Cardiac cath for ischemic work-up, timing per cardiology  1000mL fluid restriction   Meds as above for NSTEMI  Cardiology consulted, appreciate recommendations

## 2024-01-13 NOTE — PROGRESS NOTES
[facility]  Progress Note  Name: Berto Faria I  MRN: [id number]  Unit/Bed#: PPHP 515-01 I Date of Admission: 1/8/2024   Date of Service: 1/13/2024 I Hospital Day: 5    Assessment/Plan   * Cardiogenic shock (HCC)  Assessment & Plan  As evidenced by low ScvO2, cool extremities, and newly depressed/worsening EF on STAT echo 01/11.     01/11 Echo:    Left Ventricle: Left ventricular cavity size is mildly dilated. Wall thickness is normal. There is eccentric hypertrophy. The left ventricular ejection fraction is 32%. Systolic function is severely reduced. There is severe global hypokinesis with specific regional wall abnormalities in the anteroseptal, inferior and apical regions. Diastolic function is abnormal.    Left Atrium: The atrium is moderately dilated.    Mitral Valve: There is mild to moderate regurgitation.    Tricuspid Valve: There is mild to moderate regurgitation. The right ventricular systolic pressure is moderately elevated. The estimated right ventricular systolic pressure is 57.00 mmHg.    Plan:  Trend ScvO2  Continue milrinone and titrate based on clinical exam and ScvO2  Monitor endpoints of resuscitation  Continuous cardiopulmonary monitoring  Continue CRRT for volume management    Acute respiratory failure with hypoxia (HCC)  Assessment & Plan  Secondary to likely bacterial pneumonia and element of volume overload   1/7/24 CT Chest: Bilateral multilevel airspace consolidation, groundglass opacities, septal thickening and small bilateral pleural effusions secondary to volume overloaded as well as pneumonia.      Plan:   Wean supplemental oxygen as able to maintain SpO2 > 90%   Continue volume removal with CRRT  Antibiotic plan detailed above    Continue pulmonary hygiene. Incentive spirometer q1h while awake, encourage coughing and deep breathing. Upright positioning  Suction as needed and closely monitor secretions. Maintain HOB >30 degrees. Q4h oral care  with chlorhexidine BID      NSTEMI (non-ST elevated myocardial infarction) (HCC)  Assessment & Plan  Presented to [facility] ED with shortness of breath. Troponin and EKG checked as part of initial workup. EKG without acute ischemic changes   NSTEMI likely related to increased demand from acute hypoxic respiratory failure and septic shock   Troponin 92993 > 75159 > 05544   Cardiac Cath 1/12:Severe in-stent restenosis bifurcation ostial LAD/LCx,  RCA, elevated LVEDP, porcelain aorta.    Plan:  ASA 81mg daily   Atorvastatin 80mg qHS   Continue holding home afterload reduction agents/AV aubrie blockers given resolving septic shock:   Carvedilol 6.25mg BID  Isosorbide mononitrate 30mg q24h   Metoprolol succinate 50mg q24  Nifedipine 30mg q24 hours   Cardiology consulted, appreciate recommendations- ?  High risk PCI  CT Surgery Consulted-recommendations for supportive care or high risk PCI    New onset a-fib (HCC)  Assessment & Plan  New-onset in AM of 1/11. With occasional RVR with associated hypotension. Amiodarone bolus x1 given at RRT while in dialysis 1/11 AM.    Plan:  Continue amiodarone infusion and PO  Heparin ACS/low for AF    Acute on chronic diastolic (congestive) heart failure (HCC)  Assessment & Plan  Likely related to NSTEMI from septic shock and element of stress cardiomyopathy, but cannot rule about possible ischemic element given regional wall changes   1/7/24 TTE: LV cavity size is normal. Wall thickness is mildly increased. LVEF 40%. Systolic function is moderately reduced. Following segments are hypokinetic: mid anteroseptal, mid inferior, apical anterior, apical septal, apical inferior and apex.RV normal. Mild MR. Mild TR    Plan:   Milrinone for cardiogenic shock as above  Volume removal with CRRT  Daily weights  Continue holding home afterload reduction agents/AV aubrie blockers given resolving septic shock:   Carvedilol 6.25mg BID  Isosorbide mononitrate 30mg q24h   Metoprolol succinate 50mg  q24  Nifedipine 30mg q24 hours   Cardiac cath for ischemic work-up, timing per cardiology  1000mL fluid restriction   Meds as above for NSTEMI  Cardiology consulted, appreciate recommendations      Multifocal pneumonia  Assessment & Plan  Presented 23 to [facility] with new oxygen requirement, fatigue   Relevant imagin/7/24 CT Chest: Bilateral multilevel airspace consolidation, groundglass opacities, septal thickening and small bilateral pleural effusions secondary to volume overloaded as well as pneumonia.  Initial procalcitonin: 3.16 (24)  Possibly falsely elevated due to ESRD, but had lower levels in the past   Cultures:  24 BCXx2: No growth  23 COVID/Flu/RSV: Negative   23 RP2: Negative   24 MRSA Cx: Negative    24 BCXx2: No growth (x 4 days)     Plan:   Current Antibiotics: Cefepime for 5d course  Continue to monitor fever and WBC curve   Follow up cultures      ESRD (end stage renal disease) (Formerly [facility])  Assessment & Plan  Dialysis regimen: Tuesday, Thursday, Saturday via right arm AV fistula   24: Right IJ temporary HD catheter placement   24: CRRT initiated   24: Tolerated -100 to -150 cc/hr volume removal   Re-initiation of CRRT     Plan:   CRRT re-initiated  - continue negative as tolerated  Cinacalcet 30mg daily   Nephrology consulted, appreciate recommendations-possible transition back to IHD on Monday, pending hemodynamics    Septic shock ([facility])  Assessment & Plan  Resolved  Patient presented to the ER 24 with fatigue and hypoxia, suspected source of bacterial pneumonia   Likely component of adrenal insufficiency given chronic, daily prednisone use   Not given 30ml/kg IVF resuscitation in the setting of anuric ESRD      Plan:  Maintain MAP 60-65 given ESRD  Tolerating volume removal with CRRT  Was transitioned back to home Prednisone 5 mg qD   Continue broad spectrum antibiotics pending culture results     Type 1 diabetes mellitus on insulin therapy  ([facility])  Assessment & Plan  Lab Results   Component Value Date    HGBA1C 7.7 (H) 01/07/2024    HGBA1C 6.5 (H) 02/23/2018   Anticipate hyperglycemia associated with higher steroid dose    Plan:   Continue AC/HS sliding scale algorithm   Lispro 8 units TID with meals  Lantus 10 units q12h   Adjust insulin regimen as needed to maintain goal -180  Carb controlled diet     Renal transplant failure and rejection  Assessment & Plan  History of renal transplant chronically on tacrolimus 1 mg p.o. nightly, 2 mg p.o. every morning, AZA 50 daily, Prednisone 5 Daily    Plan:  Continue tacrolimus 2mg qAM, 1mg qPM  Prednisone 5 mg qD  Holding home azathioprine 50mg daily   Nephrology consulted, appreciate recommendations   Reach out to transplant center for update regarding these medications    Chronic anemia  Assessment & Plan  Secondary to chronic disease and ESRD  Baseline hemoglobin: 9.6 - 11.8    Plan:   Transfuse as indicated in the setting of hemodynamic instability or signs of active bleeding  CBC daily    S/P AKA (above knee amputation), right ([facility])  Assessment & Plan  Secondary to chronic osteomyelitis     Plan:   PT/OT when appropriate   Offloading and frequent turns              Disposition: Critical care    ICU Core Measures     A: Assess, Prevent, and Manage Pain Has pain been assessed? Yes  Need for changes to pain regimen? No   B: Both SAT/SAT  N/A   C: Choice of Sedation RASS Goal: 0 Alert and Calm  Need for changes to sedation or analgesia regimen? No   D: Delirium CAM-ICU: Negative   E: Early Mobility  Plan for early mobility? Yes   F: Family Engagement Plan for family engagement today? Yes     Antibiotic Review: Patient on appropriate coverage based on culture data.     Review of Invasive Devices:      Central access plan: Patient has multiple central venous catheters.   Tere Plan: Keep arterial line for hemodynamic monitoring    Prophylaxis:  VTE VTE covered by:  heparin (porcine), Intravenous, 9  Units/kg/hr at 01/13/24 0232  heparin (porcine), Intravenous  heparin (porcine), Intravenous       Stress Ulcer  covered bypantoprazole (PROTONIX) 40 mg tablet [[id number]] (Long-Term Med), pantoprazole (PROTONIX) EC tablet 40 mg [[id number]]         Significant 24hr Events     24hr events: Patient went for cardiac catheterization today with noted instant stenosis of LAD and circumflex.  No intervention performed.  CT surgery consulted with no current surgical recommendations made.  Plan for possible high risk PCI in the future.  Patient remains on milrinone 0.25, weaned to 2.13 at 1630.  Persistently low S CV O2, milrinone titrated back to 0.25.     Subjective   Review of Systems   Constitutional:  Positive for activity change and appetite change. Negative for fatigue.   Respiratory:  Negative for chest tightness and shortness of breath.    Cardiovascular:  Negative for chest pain.   Gastrointestinal:  Negative for abdominal pain.   Genitourinary:  Negative for difficulty urinating and frequency.   Musculoskeletal:  Negative for arthralgias.   Neurological:  Negative for dizziness and tremors.   All other systems reviewed and are negative.     Objective                            Vitals I/O      Most Recent Min/Max in 24hrs   Temp 98.2 °F (36.8 °C) Temp  Min: 97.5 °F (36.4 °C)  Max: 98.8 °F (37.1 °C)   Pulse 89 Pulse  Min: 81  Max: 92   Resp 22 Resp  Min: 17  Max: 40   /56 BP  Min: 78/42  Max: 125/66   O2 Sat 96 % SpO2  Min: 92 %  Max: 100 %      Intake/Output Summary (Last 24 hours) at 1/13/2024 0451  Last data filed at 1/13/2024 0400  Gross per 24 hour   Intake 1462 ml   Output 2980 ml   Net -1518 ml       Diet Cardiovascular; Sodium 2 GM; Fluid Restriction 1800 ML, Consistent Carbohydrate Diet Level 2 (5 carb servings/75 grams CHO/meal)    Invasive Monitoring   Arterial Line  [place] BP 93/66  Arterial Line BP  Min: 71/32  Max: 140/53   MAP 80 mmHg  Arterial Line MAP (mmHg)  Min: 46 mmHg  Max: 87 mmHg            Physical Exam   Physical Exam  Vitals and nursing note reviewed.   Eyes:      Extraocular Movements: Extraocular movements intact.      Pupils: Pupils are equal, round, and reactive to light.   Skin:     General: Skin is warm.   HENT:      Head: Normocephalic.      Mouth/Throat:      Mouth: Mucous membranes are moist.   Cardiovascular:      Rate and Rhythm: Normal rate and regular rhythm.   Musculoskeletal:      Right lower leg: No edema.      Left lower leg: No edema.   Abdominal:      Palpations: Abdomen is soft.   Pulmonary:      Effort: Pulmonary effort is normal.   Neurological:      General: No focal deficit present.      Mental Status: He is alert and oriented to person, place and time. Mental status is at baseline.            Diagnostic Studies           Medications:  Scheduled PRN   amiodarone, 200 mg, TID With Meals  aspirin, 81 mg, QAM  atorvastatin, 80 mg, Daily With Dinner  calcitriol, 0.5 mcg, Once per day on Tuesday Thursday Saturday  cefepime, 1,000 mg, Q12H  cinacalcet, 30 mg, Daily  insulin glargine, 10 Units, Q12H JAMARCUS  insulin lispro, 1-6 Units, HS  insulin lispro, 1-6 Units, TID With Meals  insulin lispro, 8 Units, TID With Meals  pantoprazole, 40 mg, Early Morning  predniSONE, 5 mg, Daily  sodium phosphate, 6 mmol, Once  tacrolimus, 1 mg, QPM  tacrolimus, 2 mg, QAM      albuterol, 2 puff, Q4H PRN  glycerin-hypromellose-, 1 drop, Q4H PRN  heparin (porcine), 1,500 Units, Q6H PRN  heparin (porcine), 3,000 Units, Q6H PRN  ondansetron, 4 mg, Q8H PRN       Continuous    amiodarone (CORDARONE) 900 mg in dextrose 5 % 500 mL infusion, 0.5 mg/min, Last Rate: 0.5 mg/min (01/12/24 1247)  heparin (porcine), 3-20 Units/kg/hr (Order-Specific), Last Rate: 9 Units/kg/hr (01/13/24 0232)  milrinone (Primacor) infusion, 0.13 mcg/kg/min, Last Rate: 0.13 mcg/kg/min (01/12/24 1632)  norepinephrine, 1-30 mcg/min, Last Rate: Stopped (01/12/24 1540)  NxStage K 4/Ca 3, 20,000 mL         Labs:    CBC    Recent  Labs     01/12/24  0431 01/12/24  1209 01/13/24  0223   WBC 10.09  --  8.93   HGB 7.8* 7.8* 7.1*   HCT 22.9*  --  21.6*     --  170     BMP    Recent Labs     01/12/24 2039 01/13/24  0212   SODIUM 134* 134*   K 4.8 4.7    102   CO2 20* 21   AGAP 13 11   BUN 37* 30*   CREATININE 2.87* 2.51*   CALCIUM 8.3* 8.7       Coags    Recent Labs     01/11/24  1354 01/11/24 2005 01/12/24  0232 01/13/24  0012   INR 1.17  --  1.23*  --    PTT 45*   < > 74* >210*    < > = values in this interval not displayed.        Additional Electrolytes  Recent Labs     01/12/24 2039 01/13/24 0212   MG 2.0 2.1   PHOS 2.8 2.4*   CAIONIZED 1.13 1.14          Blood Gas    No recent results  Recent Labs     01/13/24  0444   PHVEN 7.433*   MCP8CIV 37.9*   PO2VEN 25.1*   ZBG1HED 24.8   BEVEN 0.5   X5XROKB 47.2*    LFTs  Recent Labs     01/12/24  0431 01/13/24  0243   ALT 12 13   AST 18 19   ALKPHOS 57 56   ALB 3.2* 3.2*   TBILI 0.78 0.90       Infectious  No recent results  Glucose  Recent Labs     01/12/24  0844 01/12/24  1452 01/12/24 2039 01/13/24  0212   GLUC 121 207* 151* 143*               Andre Mendoza PA-C

## 2024-01-13 NOTE — ASSESSMENT & PLAN NOTE
Lab Results   Component Value Date    HGBA1C 7.7 (H) 01/07/2024    HGBA1C 6.5 (H) 02/23/2018   Anticipate hyperglycemia associated with higher steroid dose    Plan:   Continue AC/HS sliding scale algorithm   Lispro 8 units TID with meals  Lantus 10 units q12h   Adjust insulin regimen as needed to maintain goal -180  Carb controlled diet

## 2024-01-13 NOTE — ASSESSMENT & PLAN NOTE
New-onset in AM of 1/11. With occasional RVR with associated hypotension. Amiodarone bolus x1 given at RRT while in dialysis 1/11 AM.    Plan:  Continue amiodarone infusion and PO  Heparin ACS/low for AF

## 2024-01-13 NOTE — PROGRESS NOTES
Procedure Note - Nephrology   Berto Faria 53 y.o. male MRN: [id number]  Unit/Bed#: [facility] 515-01 Encounter: [id number]      Assessment / Plan:  ESRD on HD TTS at [facility] -patient was on CRRT in light of cardiogenic shock and hypotension initially on pressors, now off pressors. Patient transitioned to IHD but back on CRRT as below  -Patient due for hemodialysis 1/11 but held due to rapid afib with hypotension. Transferred to ICU. Started back on CRRT 1/11/24. Now again on pressors and on milrinone gtt.   -Target weight 47.5 kg outpatient  -continue UF as tolerated on CRRT, running -75ml/hr now  -Patient seen and examined by me on CRRT today  -monitor serial labs  -consider transition back to IHD if BP stable Monday 1/15/24  Access-AV fistula present and functioning, now with permacath-continue CRRT with this  Anemia due to other ESRD-on Mircera outpatient, Hgb below goal at 7.1 as of today, monitor CBC  Secondary hyperparathyroidism renal origin-continue Cinacalcet 30 mg daily, calcitriol 0.5 mcg 3 times weekly, monitor PTH outpatient  Hypophosphatemia - replete per CRRT protocol, phos 1.8 today  History of failed kidney transplant-on azathioprine and tacrolimus as an outpatient, azathioprine on hold, continue tacrolimus home dose for now.  Should have these down titrated per outpatient nephrologist at dialysis unit.  Patient also chronically on prednisone 5mg daily-back on this  Cardiogenic versus septic shock in setting of multifocal pneumonia-on antibiotics per critical care team  Elevated troponin-cardiology following, s/p [facility] 1/12 with multivessel CAD, porcelain aorta noted, CT surgery consulted but no plans for surgery, cardio considering high risk PCI  Acute on chronic diastolic CHF-UF as tolerated on CRRT, continue oral fluid restriction, cardio follows  New onset rapid afib - management per cardiology, on amio gtt  Non gapped acidosis d/t ESRD-  correct with CRRT, resolved        Subjective:  "  Patient seen and examined by me on CRRT at approximately 10:52 AM.  Blood pressure 113/86, remains on Levophed and milrinone drips, also on amiodarone drip.  UF goal -75 mL/h.      Objective:     Vitals: Blood pressure 110/52, pulse 87, temperature 98.8 °F (37.1 °C), resp. rate (!) 24, height 5' 4\" (1.626 m), weight 46.6 kg (102 lb 11.8 oz), SpO2 99%.,Body mass index is 17.63 kg/m².Temp (24hrs), Av.2 °F (36.8 °C), Min:97.5 °F (36.4 °C), Max:99 °F (37.2 °C)      Weight (last 2 days)       Date/Time Weight    24 0600 46.6 (102.73)    24 0508 46.8 (103.18)    24 1442 50.3 (111)    24 0504 50.4 (111.11)     Weight: WITH PROSTHETIC ON; PT STATES IT WEIGHS 3.2 KG at 24 0504              Intake/Output Summary (Last 24 hours) at 2024 1246  Last data filed at 2024 1200  Gross per 24 hour   Intake 1401 ml   Output 3170 ml   Net -1769 ml     I/O last 24 hours:  In: 1885 [P.O.:220; I.V.:736; IV Piggyback:929]  Out: 3780 [Other:3780]        Physical Exam:   Physical Exam  Vitals reviewed.   Constitutional:       General: He is not in acute distress.     Appearance: He is well-developed. He is not diaphoretic.   HENT:      Head: Normocephalic and atraumatic.      Nose: Nose normal.      Mouth/Throat:      Mouth: Mucous membranes are moist.      Pharynx: No oropharyngeal exudate.   Eyes:      General: No scleral icterus.        Right eye: No discharge.         Left eye: No discharge.   Neck:      Thyroid: No thyromegaly.   Cardiovascular:      Rate and Rhythm: Normal rate and regular rhythm.      Heart sounds: Normal heart sounds.   Pulmonary:      Effort: Pulmonary effort is normal.      Breath sounds: Normal breath sounds. No wheezing or rales.   Abdominal:      General: Bowel sounds are normal. There is no distension.      Palpations: Abdomen is soft.      Tenderness: There is no abdominal tenderness.   Musculoskeletal:         General: No swelling. Normal range of motion.      " Cervical back: Neck supple.   Lymphadenopathy:      Cervical: No cervical adenopathy.   Skin:     General: Skin is warm and dry.      Coloration: Skin is not jaundiced.      Findings: No rash.   Neurological:      General: No focal deficit present.      Mental Status: He is alert.      Comments: awake   Psychiatric:         Mood and Affect: Mood normal.         Behavior: Behavior normal.         Invasive Devices       Peripheral Intravenous Line  Duration             Peripheral IV 01/11/24 Left Antecubital 1 day    Peripheral IV 01/13/24 Left;Upper;Ventral (anterior) Arm <1 day              Arterial Line  Duration             Arterial Line 01/11/24 Left Radial 1 day              Line  Duration             Hemodialysis AV Fistula Right Upper arm -- days              Hemodialysis Catheter  Duration             HD Permanent Double Catheter 4 days                    Medications:    Scheduled Meds:  Current Facility-Administered Medications   Medication Dose Route Frequency Provider Last Rate    acetaminophen  650 mg Oral Q6H PRN Rosalva Taylor PA-C      albuterol  2 puff Inhalation Q4H PRN GAVINO Baker      amiodarone (CORDARONE) 900 mg in dextrose 5 % 500 mL infusion  0.5 mg/min Intravenous Continuous Katie JaramilloekBERLINNP 0.5 mg/min (01/12/24 1247)    amiodarone  200 mg Oral TID With Meals GAVINO Baker      aspirin  81 mg Oral QAM GAVINO Baker      atorvastatin  80 mg Oral Daily With Dinner GAVINO Baker      calcitriol  0.5 mcg Oral Once per day on Tuesday Thursday Saturday GAVINO Baker      cefepime  1,000 mg Intravenous Q12H GAVINO Baker Stopped (01/13/24 1200)    cinacalcet  30 mg Oral Daily GAVINO Baker      glycerin-hypromellose-  1 drop Right Eye Q4H PRN Katie Rose Mariecsek, CRNP      heparin (porcine)  3-20 Units/kg/hr (Order-Specific) Intravenous Titrated Katie Rose Mariecsek CRNP 11 Units/kg/hr (01/13/24 1000)    heparin (porcine)  1,500 Units  Intravenous Q6H PRN Katie Grecsek, CRNP      heparin (porcine)  3,000 Units Intravenous Q6H PRN Katie Grecsek, CRNP      insulin glargine  10 Units Subcutaneous Q12H JAMARCUS Katie Grecsek, CRNP      insulin lispro  1-6 Units Subcutaneous HS Katie Grecsek, CRNP      insulin lispro  1-6 Units Subcutaneous TID With Meals Katie Grecsek, CRNP      insulin lispro  8 Units Subcutaneous TID With Meals Katie Grecsek, CRNP      magnesium sulfate  1 g Intravenous Once Rosalva Taylor PA-C      milrinone (Primacor) infusion  0.25 mcg/kg/min Intravenous Continuous Andre Mendoza PA-C 0.25 mcg/kg/min (01/13/24 0521)    norepinephrine  1-30 mcg/min Intravenous Titrated Katie Grecsek, CRNP Stopped (01/13/24 1100)    NxStage K 4/Ca 3  20,000 mL Dialysis Continuous Wendy K Doug, DO      ondansetron  4 mg Intravenous Q8H PRN Katie Grecsek, CRNP      pantoprazole  40 mg Oral Early Morning Katie Grecsek, CRNP      predniSONE  5 mg Oral Daily Katie Grecsek, CRNP      sodium phosphate  9 mmol Intravenous Once Rosalva Taylor PA-C 9 mmol (01/13/24 1150)    tacrolimus  1 mg Oral QPM Katie Grecsek, CRNP      tacrolimus  2 mg Oral QAM Katie Grecsek, CRNP         PRN Meds:.  acetaminophen    albuterol    glycerin-hypromellose-    heparin (porcine)    heparin (porcine)    ondansetron    Continuous Infusions:amiodarone (CORDARONE) 900 mg in dextrose 5 % 500 mL infusion, 0.5 mg/min, Last Rate: 0.5 mg/min (01/12/24 1247)  heparin (porcine), 3-20 Units/kg/hr (Order-Specific), Last Rate: 11 Units/kg/hr (01/13/24 1000)  milrinone (Primacor) infusion, 0.25 mcg/kg/min, Last Rate: 0.25 mcg/kg/min (01/13/24 0521)  norepinephrine, 1-30 mcg/min, Last Rate: Stopped (01/13/24 1100)  NxStage K 4/Ca 3, 20,000 mL            LAB RESULTS:      Results from last 7 days   Lab Units 01/13/24  0901 01/13/24  0243 01/13/24  0223 01/13/24  0212 01/12/24  2039 01/12/24  1452 01/12/24  1209 01/12/24  0844 01/12/24  0431  01/12/24  0232 01/11/24  2035 01/11/24  1455 01/11/24  1354 01/11/24  0531 01/10/24  0336 01/09/24  1005 01/09/24  0334 01/08/24  1628 01/08/24  0429 01/07/24  1015   WBC Thousand/uL  --   --  8.93  --   --   --   --   --  10.09  --   --   --  12.97* 10.99* 11.39*  --  13.49*  --  13.80* 13.58*   HEMOGLOBIN g/dL  --   --  7.1*  --   --   --  7.8*  --  7.8*  --   --   --  9.3* 9.8* 8.9*  --  8.9*  --  10.3* 12.0   HEMATOCRIT %  --   --  21.6*  --   --   --   --   --  22.9*  --   --   --  28.3* 30.3* 27.8*  --  27.6*  --  32.2* 37.9   PLATELETS Thousands/uL  --   --  170  --   --   --   --   --  198  --   --   --  237 194 197  --  187  --  215 250   NEUTROS PCT %  --   --  58  --   --   --   --   --  61  --   --   --   --   --   --   --  83*  --   --  81*   LYMPHS PCT %  --   --  28  --   --   --   --   --  26  --   --   --   --   --   --   --  9*  --   --  10*   LYMPHO PCT %  --   --   --   --   --   --   --   --   --   --   --   --   --   --   --   --   --   --  12*  --    MONOS PCT %  --   --  11  --   --   --   --   --  10  --   --   --   --   --   --   --  7  --   --  8   MONO PCT %  --   --   --   --   --   --   --   --   --   --   --   --   --   --   --   --   --   --  9  --    EOS PCT %  --   --  2  --   --   --   --   --  2  --   --   --   --   --   --   --  0  --  0 1   POTASSIUM mmol/L 4.1  --   --  4.7 4.8 5.0  --  4.3  --  4.4 4.8   < >  --  5.2 4.5   < >  --    < > 4.6 3.5   CHLORIDE mmol/L 103  --   --  102 101 100  --  101  --  101 100   < >  --  96 100   < >  --    < > 95* 96   CO2 mmol/L 24  --   --  21 20* 16*  --  22  --  22 20*   < >  --  18* 25   < >  --    < > 23 27   BUN mg/dL 27*  --   --  30* 37* 40*  --  45*  --  56* 71*   < >  --  81* 51*   < >  --    < > 47* 30*   CREATININE mg/dL 2.27*  --   --  2.51* 2.87* 3.30*  --  3.61*  --  4.67* 5.88*   < >  --  7.18* 4.92*   < >  --    < > 7.05* 5.36*   CALCIUM mg/dL 8.7  --   --  8.7 8.3* 8.5  --  8.2*  --  8.2* 8.1*   < >  --  8.7 8.8   < >  --    " < > 8.9 9.3   ALK PHOS U/L  --  56  --   --   --   --   --   --  57  --   --   --   --   --   --   --   --   --   --  98   ALT U/L  --  13  --   --   --   --   --   --  12  --   --   --   --   --   --   --   --   --   --  19   AST U/L  --  19  --   --   --   --   --   --  18  --   --   --   --   --   --   --   --   --   --  27   MAGNESIUM mg/dL 1.9  --   --  2.1 2.0 2.2  --  1.9 2.1 2.2 2.7   < >  --  3.2* 2.1   < >  --    < > 2.3  --    PHOSPHORUS mg/dL 1.8*  --   --  2.4* 2.8 3.1  --  2.6* 2.8 2.5* 2.5*   < >  --  3.4 3.1   < >  --    < >  --   --     < > = values in this interval not displayed.       CUTURES:  Lab Results   Component Value Date    BLOODCX No Growth After 4 Days. 01/08/2024    BLOODCX No Growth After 4 Days. 01/08/2024    BLOODCX No Growth After 5 Days. 01/07/2024    BLOODCX No Growth After 5 Days. 01/07/2024    BLOODCX No Growth After 5 Days. 04/27/2023    BLOODCX No Growth After 5 Days. 04/27/2023    BLOODCX No Growth After 5 Days. 10/10/2022    BLOODCX No Growth After 5 Days. 10/10/2022    URINECX No Growth <1000 cfu/mL 02/23/2019    URINECX No Growth <1000 cfu/mL 07/27/2016                 Portions of the record may have been created with voice recognition software. Occasional wrong word or \"sound a like\" substitutions may have occurred due to the inherent limitations of voice recognition software. Read the chart carefully and recognize, using context, where substitutions have occurred.If you have any questions, please contact the dictating provider.    "

## 2024-01-14 ENCOUNTER — ANESTHESIA EVENT (INPATIENT)
Dept: PERIOP | Facility: HOSPITAL | Age: 54
DRG: 853 | End: 2024-01-14
Payer: MEDICARE

## 2024-01-14 ENCOUNTER — APPOINTMENT (OUTPATIENT)
Dept: PERIOP | Facility: HOSPITAL | Age: 54
DRG: 853 | End: 2024-01-14
Payer: MEDICARE

## 2024-01-14 ENCOUNTER — ANESTHESIA (INPATIENT)
Dept: PERIOP | Facility: HOSPITAL | Age: 54
DRG: 853 | End: 2024-01-14
Payer: MEDICARE

## 2024-01-14 LAB
ABO GROUP BLD BPU: NORMAL
ABO GROUP BLD BPU: NORMAL
ANION GAP SERPL CALCULATED.3IONS-SCNC: 11 MMOL/L
ANION GAP SERPL CALCULATED.3IONS-SCNC: 6 MMOL/L
ANION GAP SERPL CALCULATED.3IONS-SCNC: 8 MMOL/L
ANION GAP SERPL CALCULATED.3IONS-SCNC: 8 MMOL/L
ANION GAP SERPL CALCULATED.3IONS-SCNC: 9 MMOL/L
BASE EX.OXY STD BLDV CALC-SCNC: 41 % (ref 60–80)
BASE EXCESS BLDA CALC-SCNC: -6.6 MMOL/L
BASE EXCESS BLDV CALC-SCNC: -3.1 MMOL/L
BPU ID: NORMAL
BPU ID: NORMAL
BUN SERPL-MCNC: 22 MG/DL (ref 5–25)
BUN SERPL-MCNC: 23 MG/DL (ref 5–25)
BUN SERPL-MCNC: 24 MG/DL (ref 5–25)
BUN SERPL-MCNC: 27 MG/DL (ref 5–25)
BUN SERPL-MCNC: 30 MG/DL (ref 5–25)
CA-I BLD-SCNC: 1.09 MMOL/L (ref 1.12–1.32)
CA-I BLD-SCNC: 1.12 MMOL/L (ref 1.12–1.32)
CA-I BLD-SCNC: 1.23 MMOL/L (ref 1.12–1.32)
CA-I BLD-SCNC: 1.26 MMOL/L (ref 1.12–1.32)
CALCIUM SERPL-MCNC: 8 MG/DL (ref 8.4–10.2)
CALCIUM SERPL-MCNC: 8.3 MG/DL (ref 8.4–10.2)
CALCIUM SERPL-MCNC: 8.6 MG/DL (ref 8.4–10.2)
CALCIUM SERPL-MCNC: 8.6 MG/DL (ref 8.4–10.2)
CALCIUM SERPL-MCNC: 8.7 MG/DL (ref 8.4–10.2)
CHLORIDE SERPL-SCNC: 105 MMOL/L (ref 96–108)
CHLORIDE SERPL-SCNC: 108 MMOL/L (ref 96–108)
CO2 SERPL-SCNC: 19 MMOL/L (ref 21–32)
CO2 SERPL-SCNC: 19 MMOL/L (ref 21–32)
CO2 SERPL-SCNC: 20 MMOL/L (ref 21–32)
CO2 SERPL-SCNC: 22 MMOL/L (ref 21–32)
CO2 SERPL-SCNC: 23 MMOL/L (ref 21–32)
CREAT SERPL-MCNC: 1.92 MG/DL (ref 0.6–1.3)
CREAT SERPL-MCNC: 2.04 MG/DL (ref 0.6–1.3)
CREAT SERPL-MCNC: 2.14 MG/DL (ref 0.6–1.3)
CREAT SERPL-MCNC: 2.25 MG/DL (ref 0.6–1.3)
CREAT SERPL-MCNC: 2.39 MG/DL (ref 0.6–1.3)
CROSSMATCH: NORMAL
CROSSMATCH: NORMAL
ERYTHROCYTE [DISTWIDTH] IN BLOOD BY AUTOMATED COUNT: 15.4 % (ref 11.6–15.1)
GFR SERPL CREATININE-BSD FRML MDRD: 29 ML/MIN/1.73SQ M
GFR SERPL CREATININE-BSD FRML MDRD: 31 ML/MIN/1.73SQ M
GFR SERPL CREATININE-BSD FRML MDRD: 33 ML/MIN/1.73SQ M
GFR SERPL CREATININE-BSD FRML MDRD: 35 ML/MIN/1.73SQ M
GFR SERPL CREATININE-BSD FRML MDRD: 38 ML/MIN/1.73SQ M
GLUCOSE SERPL-MCNC: 122 MG/DL (ref 65–140)
GLUCOSE SERPL-MCNC: 128 MG/DL (ref 65–140)
GLUCOSE SERPL-MCNC: 133 MG/DL (ref 65–140)
GLUCOSE SERPL-MCNC: 180 MG/DL (ref 65–140)
GLUCOSE SERPL-MCNC: 201 MG/DL (ref 65–140)
GLUCOSE SERPL-MCNC: 205 MG/DL (ref 65–140)
GLUCOSE SERPL-MCNC: 209 MG/DL (ref 65–140)
GLUCOSE SERPL-MCNC: 245 MG/DL (ref 65–140)
GLUCOSE SERPL-MCNC: 246 MG/DL (ref 65–140)
GLUCOSE SERPL-MCNC: 265 MG/DL (ref 65–140)
GLUCOSE SERPL-MCNC: 272 MG/DL (ref 65–140)
HCO3 BLDA-SCNC: 18.3 MMOL/L (ref 22–28)
HCO3 BLDV-SCNC: 21.3 MMOL/L (ref 24–30)
HCT VFR BLD AUTO: 22 % (ref 36.5–49.3)
HGB BLD-MCNC: 7.2 G/DL (ref 12–17)
HGB BLD-MCNC: 7.2 G/DL (ref 12–17)
HGB BLD-MCNC: 7.4 G/DL (ref 12–17)
HGB BLD-MCNC: 7.9 G/DL (ref 12–17)
LACTATE SERPL-SCNC: 0.9 MMOL/L (ref 0.5–2)
LACTATE SERPL-SCNC: 1.5 MMOL/L (ref 0.5–2)
LACTATE SERPL-SCNC: 2.8 MMOL/L (ref 0.5–2)
MAGNESIUM SERPL-MCNC: 1.9 MG/DL (ref 1.9–2.7)
MAGNESIUM SERPL-MCNC: 2.1 MG/DL (ref 1.9–2.7)
MAGNESIUM SERPL-MCNC: 2.1 MG/DL (ref 1.9–2.7)
MCH RBC QN AUTO: 32.1 PG (ref 26.8–34.3)
MCHC RBC AUTO-ENTMCNC: 32.7 G/DL (ref 31.4–37.4)
MCV RBC AUTO: 98 FL (ref 82–98)
NASAL CANNULA: 3
O2 CT BLDA-SCNC: 11.1 ML/DL (ref 16–23)
O2 CT BLDV-SCNC: 4.5 ML/DL
OXYHGB MFR BLDA: 96.2 % (ref 94–97)
PCO2 BLDA: 33.7 MM HG (ref 36–44)
PCO2 BLDV: 35.2 MM HG (ref 42–50)
PH BLDA: 7.35 [PH] (ref 7.35–7.45)
PH BLDV: 7.4 [PH] (ref 7.3–7.4)
PHOSPHATE SERPL-MCNC: 2.4 MG/DL (ref 2.7–4.5)
PHOSPHATE SERPL-MCNC: 2.6 MG/DL (ref 2.7–4.5)
PHOSPHATE SERPL-MCNC: 2.8 MG/DL (ref 2.7–4.5)
PHOSPHATE SERPL-MCNC: 3.1 MG/DL (ref 2.7–4.5)
PHOSPHATE SERPL-MCNC: 3.2 MG/DL (ref 2.7–4.5)
PLATELET # BLD AUTO: 150 THOUSANDS/UL (ref 149–390)
PMV BLD AUTO: 9.3 FL (ref 8.9–12.7)
PO2 BLDA: 119 MM HG (ref 75–129)
PO2 BLDV: 23.6 MM HG (ref 35–45)
POTASSIUM SERPL-SCNC: 4.4 MMOL/L (ref 3.5–5.3)
POTASSIUM SERPL-SCNC: 4.5 MMOL/L (ref 3.5–5.3)
POTASSIUM SERPL-SCNC: 4.7 MMOL/L (ref 3.5–5.3)
POTASSIUM SERPL-SCNC: 4.7 MMOL/L (ref 3.5–5.3)
POTASSIUM SERPL-SCNC: 5.2 MMOL/L (ref 3.5–5.3)
RBC # BLD AUTO: 2.24 MILLION/UL (ref 3.88–5.62)
SODIUM SERPL-SCNC: 133 MMOL/L (ref 135–147)
SODIUM SERPL-SCNC: 133 MMOL/L (ref 135–147)
SODIUM SERPL-SCNC: 135 MMOL/L (ref 135–147)
SODIUM SERPL-SCNC: 136 MMOL/L (ref 135–147)
SODIUM SERPL-SCNC: 136 MMOL/L (ref 135–147)
SPECIMEN SOURCE: ABNORMAL
UNIT DISPENSE STATUS: NORMAL
UNIT DISPENSE STATUS: NORMAL
UNIT PRODUCT CODE: NORMAL
UNIT PRODUCT CODE: NORMAL
UNIT PRODUCT VOLUME: 350 ML
UNIT PRODUCT VOLUME: 350 ML
UNIT RH: NORMAL
UNIT RH: NORMAL
WBC # BLD AUTO: 11.8 THOUSAND/UL (ref 4.31–10.16)

## 2024-01-14 PROCEDURE — 43270 EGD LESION ABLATION: CPT | Performed by: INTERNAL MEDICINE

## 2024-01-14 PROCEDURE — 82805 BLOOD GASES W/O2 SATURATION: CPT | Performed by: PHYSICIAN ASSISTANT

## 2024-01-14 PROCEDURE — 85018 HEMOGLOBIN: CPT | Performed by: PHYSICIAN ASSISTANT

## 2024-01-14 PROCEDURE — 80048 BASIC METABOLIC PNL TOTAL CA: CPT | Performed by: PHYSICIAN ASSISTANT

## 2024-01-14 PROCEDURE — P9058 RBC, L/R, CMV-NEG, IRRAD: HCPCS

## 2024-01-14 PROCEDURE — 90945 DIALYSIS ONE EVALUATION: CPT | Performed by: INTERNAL MEDICINE

## 2024-01-14 PROCEDURE — 90945 DIALYSIS ONE EVALUATION: CPT

## 2024-01-14 PROCEDURE — 84100 ASSAY OF PHOSPHORUS: CPT | Performed by: PHYSICIAN ASSISTANT

## 2024-01-14 PROCEDURE — 99291 CRITICAL CARE FIRST HOUR: CPT | Performed by: INTERNAL MEDICINE

## 2024-01-14 PROCEDURE — 0W3P8ZZ CONTROL BLEEDING IN GASTROINTESTINAL TRACT, VIA NATURAL OR ARTIFICIAL OPENING ENDOSCOPIC: ICD-10-PCS | Performed by: INTERNAL MEDICINE

## 2024-01-14 PROCEDURE — 88305 TISSUE EXAM BY PATHOLOGIST: CPT | Performed by: PATHOLOGY

## 2024-01-14 PROCEDURE — 45330 DIAGNOSTIC SIGMOIDOSCOPY: CPT | Performed by: INTERNAL MEDICINE

## 2024-01-14 PROCEDURE — 30233N1 TRANSFUSION OF NONAUTOLOGOUS RED BLOOD CELLS INTO PERIPHERAL VEIN, PERCUTANEOUS APPROACH: ICD-10-PCS | Performed by: INTERNAL MEDICINE

## 2024-01-14 PROCEDURE — 0DB68ZX EXCISION OF STOMACH, VIA NATURAL OR ARTIFICIAL OPENING ENDOSCOPIC, DIAGNOSTIC: ICD-10-PCS | Performed by: INTERNAL MEDICINE

## 2024-01-14 PROCEDURE — 83605 ASSAY OF LACTIC ACID: CPT | Performed by: PHYSICIAN ASSISTANT

## 2024-01-14 PROCEDURE — 83735 ASSAY OF MAGNESIUM: CPT | Performed by: PHYSICIAN ASSISTANT

## 2024-01-14 PROCEDURE — C1760 CLOSURE DEV, VASC: HCPCS | Performed by: STUDENT IN AN ORGANIZED HEALTH CARE EDUCATION/TRAINING PROGRAM

## 2024-01-14 PROCEDURE — 0DJD8ZZ INSPECTION OF LOWER INTESTINAL TRACT, VIA NATURAL OR ARTIFICIAL OPENING ENDOSCOPIC: ICD-10-PCS | Performed by: INTERNAL MEDICINE

## 2024-01-14 PROCEDURE — 5A1D90Z PERFORMANCE OF URINARY FILTRATION, CONTINUOUS, GREATER THAN 18 HOURS PER DAY: ICD-10-PCS | Performed by: INTERNAL MEDICINE

## 2024-01-14 PROCEDURE — C9113 INJ PANTOPRAZOLE SODIUM, VIA: HCPCS | Performed by: PHYSICIAN ASSISTANT

## 2024-01-14 PROCEDURE — 43239 EGD BIOPSY SINGLE/MULTIPLE: CPT | Performed by: INTERNAL MEDICINE

## 2024-01-14 PROCEDURE — 99232 SBSQ HOSP IP/OBS MODERATE 35: CPT | Performed by: INTERNAL MEDICINE

## 2024-01-14 PROCEDURE — 82948 REAGENT STRIP/BLOOD GLUCOSE: CPT

## 2024-01-14 PROCEDURE — 82330 ASSAY OF CALCIUM: CPT | Performed by: PHYSICIAN ASSISTANT

## 2024-01-14 PROCEDURE — 85027 COMPLETE CBC AUTOMATED: CPT | Performed by: PHYSICIAN ASSISTANT

## 2024-01-14 RX ORDER — CALCIUM GLUCONATE 20 MG/ML
2 INJECTION, SOLUTION INTRAVENOUS ONCE
Status: COMPLETED | OUTPATIENT
Start: 2024-01-14 | End: 2024-01-14

## 2024-01-14 RX ORDER — VASOPRESSIN 20 U/ML
INJECTION PARENTERAL AS NEEDED
Status: DISCONTINUED | OUTPATIENT
Start: 2024-01-14 | End: 2024-01-14

## 2024-01-14 RX ORDER — PANTOPRAZOLE SODIUM 40 MG/10ML
40 INJECTION, POWDER, LYOPHILIZED, FOR SOLUTION INTRAVENOUS EVERY 12 HOURS SCHEDULED
Status: DISCONTINUED | OUTPATIENT
Start: 2024-01-14 | End: 2024-01-20 | Stop reason: HOSPADM

## 2024-01-14 RX ORDER — PROPOFOL 10 MG/ML
INJECTION, EMULSION INTRAVENOUS AS NEEDED
Status: DISCONTINUED | OUTPATIENT
Start: 2024-01-14 | End: 2024-01-14

## 2024-01-14 RX ORDER — SODIUM CHLORIDE 9 MG/ML
INJECTION, SOLUTION INTRAVENOUS CONTINUOUS PRN
Status: DISCONTINUED | OUTPATIENT
Start: 2024-01-14 | End: 2024-01-14

## 2024-01-14 RX ORDER — CALCIUM GLUCONATE 20 MG/ML
1 INJECTION, SOLUTION INTRAVENOUS ONCE
Status: COMPLETED | OUTPATIENT
Start: 2024-01-14 | End: 2024-01-14

## 2024-01-14 RX ORDER — NOREPINEPHRINE BITARTRATE 1 MG/ML
INJECTION, SOLUTION INTRAVENOUS
Status: DISPENSED
Start: 2024-01-14 | End: 2024-01-14

## 2024-01-14 RX ORDER — CALCIUM CHLORIDE 100 MG/ML
INJECTION INTRAVENOUS; INTRAVENTRICULAR AS NEEDED
Status: DISCONTINUED | OUTPATIENT
Start: 2024-01-14 | End: 2024-01-14

## 2024-01-14 RX ORDER — MIDAZOLAM HYDROCHLORIDE 2 MG/2ML
INJECTION, SOLUTION INTRAMUSCULAR; INTRAVENOUS AS NEEDED
Status: DISCONTINUED | OUTPATIENT
Start: 2024-01-14 | End: 2024-01-14

## 2024-01-14 RX ORDER — MAGNESIUM SULFATE 1 G/100ML
1 INJECTION INTRAVENOUS ONCE
Status: COMPLETED | OUTPATIENT
Start: 2024-01-14 | End: 2024-01-14

## 2024-01-14 RX ORDER — FENTANYL CITRATE 50 UG/ML
INJECTION, SOLUTION INTRAMUSCULAR; INTRAVENOUS AS NEEDED
Status: DISCONTINUED | OUTPATIENT
Start: 2024-01-14 | End: 2024-01-14

## 2024-01-14 RX ADMIN — SODIUM CHLORIDE: 0.9 INJECTION, SOLUTION INTRAVENOUS at 13:54

## 2024-01-14 RX ADMIN — ONDANSETRON 4 MG: 2 INJECTION INTRAMUSCULAR; INTRAVENOUS at 07:07

## 2024-01-14 RX ADMIN — PANTOPRAZOLE SODIUM 40 MG: 40 INJECTION, POWDER, FOR SOLUTION INTRAVENOUS at 17:00

## 2024-01-14 RX ADMIN — PANTOPRAZOLE SODIUM 40 MG: 40 INJECTION, POWDER, FOR SOLUTION INTRAVENOUS at 03:45

## 2024-01-14 RX ADMIN — CALCIUM GLUCONATE 1 G: 20 INJECTION, SOLUTION INTRAVENOUS at 11:00

## 2024-01-14 RX ADMIN — NOREPINEPHRINE BITARTRATE 16 MCG: 1 INJECTION, SOLUTION, CONCENTRATE INTRAVENOUS at 14:18

## 2024-01-14 RX ADMIN — TACROLIMUS 1 MG: 1 CAPSULE ORAL at 17:00

## 2024-01-14 RX ADMIN — AMIODARONE HYDROCHLORIDE 200 MG: 200 TABLET ORAL at 16:58

## 2024-01-14 RX ADMIN — Medication 20000 ML: at 13:55

## 2024-01-14 RX ADMIN — AMIODARONE HYDROCHLORIDE 0.5 MG/MIN: 50 INJECTION, SOLUTION INTRAVENOUS at 18:00

## 2024-01-14 RX ADMIN — PHENYLEPHRINE HYDROCHLORIDE 200 MCG: 10 INJECTION INTRAVENOUS at 14:15

## 2024-01-14 RX ADMIN — CALCIUM GLUCONATE 2 G: 20 INJECTION, SOLUTION INTRAVENOUS at 05:27

## 2024-01-14 RX ADMIN — VASOPRESSIN 4 UNITS: 20 INJECTION INTRAVENOUS at 14:17

## 2024-01-14 RX ADMIN — NOREPINEPHRINE BITARTRATE 16 MCG: 1 INJECTION, SOLUTION, CONCENTRATE INTRAVENOUS at 14:31

## 2024-01-14 RX ADMIN — CEFEPIME 1000 MG: 1 INJECTION, POWDER, FOR SOLUTION INTRAMUSCULAR; INTRAVENOUS at 10:00

## 2024-01-14 RX ADMIN — PHENYLEPHRINE HYDROCHLORIDE 200 MCG: 10 INJECTION INTRAVENOUS at 14:13

## 2024-01-14 RX ADMIN — PREDNISONE 5 MG: 5 TABLET ORAL at 16:59

## 2024-01-14 RX ADMIN — ATORVASTATIN CALCIUM 80 MG: 80 TABLET, FILM COATED ORAL at 16:58

## 2024-01-14 RX ADMIN — NOREPINEPHRINE BITARTRATE 16 MCG: 1 INJECTION, SOLUTION, CONCENTRATE INTRAVENOUS at 14:16

## 2024-01-14 RX ADMIN — VASOPRESSIN 4 UNITS: 20 INJECTION INTRAVENOUS at 14:29

## 2024-01-14 RX ADMIN — ACETAMINOPHEN 650 MG: 325 TABLET, FILM COATED ORAL at 21:05

## 2024-01-14 RX ADMIN — INSULIN LISPRO 3 UNITS: 100 INJECTION, SOLUTION INTRAVENOUS; SUBCUTANEOUS at 08:05

## 2024-01-14 RX ADMIN — NOREPINEPHRINE BITARTRATE 16 MCG: 1 INJECTION, SOLUTION, CONCENTRATE INTRAVENOUS at 14:29

## 2024-01-14 RX ADMIN — CINACALCET 30 MG: 30 TABLET, FILM COATED ORAL at 16:59

## 2024-01-14 RX ADMIN — Medication 20000 ML: at 21:42

## 2024-01-14 RX ADMIN — INSULIN GLARGINE 5 UNITS: 100 INJECTION, SOLUTION SUBCUTANEOUS at 21:02

## 2024-01-14 RX ADMIN — FENTANYL CITRATE 25 MCG: 50 INJECTION INTRAMUSCULAR; INTRAVENOUS at 14:04

## 2024-01-14 RX ADMIN — CALCIUM CHLORIDE 0.5 G: 100 INJECTION INTRAVENOUS; INTRAVENTRICULAR at 14:12

## 2024-01-14 RX ADMIN — NOREPINEPHRINE BITARTRATE 16 MCG: 1 INJECTION, SOLUTION, CONCENTRATE INTRAVENOUS at 14:40

## 2024-01-14 RX ADMIN — FENTANYL CITRATE 50 MCG: 50 INJECTION INTRAMUSCULAR; INTRAVENOUS at 14:18

## 2024-01-14 RX ADMIN — NOREPINEPHRINE BITARTRATE 13 MCG/MIN: 1 INJECTION, SOLUTION, CONCENTRATE INTRAVENOUS at 00:59

## 2024-01-14 RX ADMIN — INSULIN LISPRO 2 UNITS: 100 INJECTION, SOLUTION INTRAVENOUS; SUBCUTANEOUS at 02:16

## 2024-01-14 RX ADMIN — NOREPINEPHRINE BITARTRATE 5 MCG/MIN: 1 INJECTION, SOLUTION, CONCENTRATE INTRAVENOUS at 02:12

## 2024-01-14 RX ADMIN — PROPOFOL 50 MCG/KG/MIN: 10 INJECTION, EMULSION INTRAVENOUS at 14:05

## 2024-01-14 RX ADMIN — PHENYLEPHRINE HYDROCHLORIDE 200 MCG: 10 INJECTION INTRAVENOUS at 14:09

## 2024-01-14 RX ADMIN — ASPIRIN 81 MG CHEWABLE TABLET 81 MG: 81 TABLET CHEWABLE at 16:58

## 2024-01-14 RX ADMIN — NOREPINEPHRINE BITARTRATE 2 MCG/MIN: 1 INJECTION, SOLUTION, CONCENTRATE INTRAVENOUS at 18:00

## 2024-01-14 RX ADMIN — Medication 20000 ML: at 05:00

## 2024-01-14 RX ADMIN — PROPOFOL 40 MG: 10 INJECTION, EMULSION INTRAVENOUS at 14:04

## 2024-01-14 RX ADMIN — MIDAZOLAM 2 MG: 1 INJECTION INTRAMUSCULAR; INTRAVENOUS at 14:00

## 2024-01-14 RX ADMIN — MAGNESIUM SULFATE HEPTAHYDRATE 1 G: 1 INJECTION, SOLUTION INTRAVENOUS at 06:08

## 2024-01-14 RX ADMIN — PHENYLEPHRINE HYDROCHLORIDE 200 MCG: 10 INJECTION INTRAVENOUS at 14:11

## 2024-01-14 RX ADMIN — PHENYLEPHRINE HYDROCHLORIDE 200 MCG: 10 INJECTION INTRAVENOUS at 14:17

## 2024-01-14 RX ADMIN — MAGNESIUM SULFATE HEPTAHYDRATE 1 G: 1 INJECTION, SOLUTION INTRAVENOUS at 16:45

## 2024-01-14 RX ADMIN — CALCIUM CHLORIDE 0.5 G: 100 INJECTION INTRAVENOUS; INTRAVENTRICULAR at 14:16

## 2024-01-14 RX ADMIN — NOREPINEPHRINE BITARTRATE 16 MCG: 1 INJECTION, SOLUTION, CONCENTRATE INTRAVENOUS at 14:38

## 2024-01-14 RX ADMIN — CEFEPIME 1000 MG: 1 INJECTION, POWDER, FOR SOLUTION INTRAMUSCULAR; INTRAVENOUS at 23:04

## 2024-01-14 NOTE — ANESTHESIA POSTPROCEDURE EVALUATION
Post-Op Assessment Note    CV Status:  Stable  Pain Score: 0    Pain management: adequate       Mental Status:  Sleepy   Hydration Status:  Stable   PONV Controlled:  Controlled   Airway Patency:  Patent  Two or more mitigation strategies used for obstructive sleep apnea   Post Op Vitals Reviewed: Yes      Staff: Anesthesiologist, CRNA               BP   94/59   Temp 98.1   Pulse 80   Resp 18   SpO2 100   Bed side report given to ICU RN.

## 2024-01-14 NOTE — ASSESSMENT & PLAN NOTE
Likely related to NSTEMI from septic shock and element of stress cardiomyopathy, but cannot rule about possible ischemic element given regional wall changes   1/7/24 TTE: LV cavity size is normal. Wall thickness is mildly increased. LVEF 40%. Systolic function is moderately reduced. Following segments are hypokinetic: mid anteroseptal, mid inferior, apical anterior, apical septal, apical inferior and apex.RV normal. Mild MR. Mild TR    Plan:   Milrinone for cardiogenic shock as above  Volume removal with CRRT  Daily weights  Continue holding home afterload reduction agents/AV aubrie blockers given resolving septic shock:   Carvedilol 6.25mg BID  Isosorbide mononitrate 30mg q24h   Metoprolol succinate 50mg q24  Nifedipine 30mg q24 hours   1000mL fluid restriction   Meds as above for NSTEMI  Cardiology consulted, appreciate recommendations

## 2024-01-14 NOTE — PLAN OF CARE
Problem: Prexisting or High Potential for Compromised Skin Integrity  Goal: Skin integrity is maintained or improved  Description: INTERVENTIONS:  - Identify patients at risk for skin breakdown  - Assess and monitor skin integrity  - Assess and monitor nutrition and hydration status  - Monitor labs   - Assess for incontinence   - Turn and reposition patient  - Assist with mobility/ambulation  - Relieve pressure over bony prominences  - Avoid friction and shearing  - Provide appropriate hygiene as needed including keeping skin clean and dry  - Evaluate need for skin moisturizer/barrier cream  - Collaborate with interdisciplinary team   - Patient/family teaching  - Consider wound care consult   Outcome: Progressing     Problem: PAIN - ADULT  Goal: Verbalizes/displays adequate comfort level or baseline comfort level  Description: Interventions:  - Encourage patient to monitor pain and request assistance  - Assess pain using appropriate pain scale  - Administer analgesics based on type and severity of pain and evaluate response  - Implement non-pharmacological measures as appropriate and evaluate response  - Consider cultural and social influences on pain and pain management  - Notify physician/advanced practitioner if interventions unsuccessful or patient reports new pain  Outcome: Progressing     Problem: INFECTION - ADULT  Goal: Absence or prevention of progression during hospitalization  Description: INTERVENTIONS:  - Assess and monitor for signs and symptoms of infection  - Monitor lab/diagnostic results  - Monitor all insertion sites, i.e. indwelling lines, tubes, and drains  - Monitor endotracheal if appropriate and nasal secretions for changes in amount and color  - [place] appropriate cooling/warming therapies per order  - Administer medications as ordered  - Instruct and encourage patient and family to use good hand hygiene technique  - Identify and instruct in appropriate isolation precautions for  identified infection/condition  Outcome: Progressing  Goal: Absence of fever/infection during neutropenic period  Description: INTERVENTIONS:  - Monitor WBC    Outcome: Progressing     Problem: SAFETY ADULT  Goal: Patient will remain free of falls  Description: INTERVENTIONS:  - Educate patient/family on patient safety including physical limitations  - Instruct patient to call for assistance with activity   - Consult OT/PT to assist with strengthening/mobility   - Keep Call bell within reach  - Keep bed low and locked with side rails adjusted as appropriate  - Keep care items and personal belongings within reach  - Initiate and maintain comfort rounds  - Make Fall Risk Sign visible to staff  - Offer Toileting every 2 Hours, in advance of need  - Initiate/Maintain bed alarm  - Obtain necessary fall risk management equipment: non-slip socks  - Apply yellow socks and bracelet for high fall risk patients  - Consider moving patient to room near nurses station  Outcome: Progressing  Goal: Maintain or return to baseline ADL function  Description: INTERVENTIONS:  -  Assess patient's ability to carry out ADLs; assess patient's baseline for ADL function and identify physical deficits which impact ability to perform ADLs (bathing, care of mouth/teeth, toileting, grooming, dressing, etc.)  - Assess/evaluate cause of self-care deficits   - Assess range of motion  - Assess patient's mobility; develop plan if impaired  - Assess patient's need for assistive devices and provide as appropriate  - Encourage maximum independence but intervene and supervise when necessary  - Involve family in performance of ADLs  - Assess for home care needs following discharge   - Consider OT consult to assist with ADL evaluation and planning for discharge  - Provide patient education as appropriate  Outcome: Progressing  Goal: Maintains/Returns to pre admission functional level  Description: INTERVENTIONS:  - Perform AM-PAC 6 Click Basic Mobility/  Daily Activity assessment daily.  - Set and communicate daily mobility goal to care team and patient/family/caregiver.   - Collaborate with rehabilitation services on mobility goals if consulted  - Perform Range of Motion 4 times a day.  - Reposition patient every 2 hours.  - Dangle patient 3 times a day  - Stand patient 3 times a day  - Ambulate patient 3 times a day  - Out of bed to chair 3 times a day   - Out of bed for meals 3 times a day  - Out of bed for toileting  - Record patient progress and toleration of activity level   Outcome: Progressing     Problem: DISCHARGE PLANNING  Goal: Discharge to home or other facility with appropriate resources  Description: INTERVENTIONS:  - Identify barriers to discharge w/patient and caregiver  - Arrange for needed discharge resources and transportation as appropriate  - Identify discharge learning needs (meds, wound care, etc.)  - Arrange for interpretive services to assist at discharge as needed  - Refer to Case Management Department for coordinating discharge planning if the patient needs post-hospital services based on physician/advanced practitioner order or complex needs related to functional status, cognitive ability, or social support system  Outcome: Progressing     Problem: Knowledge Deficit  Goal: Patient/family/caregiver demonstrates understanding of disease process, treatment plan, medications, and discharge instructions  Description: Complete learning assessment and assess knowledge base.  Interventions:  - Provide teaching at level of understanding  - Provide teaching via preferred learning methods  Outcome: Progressing     Problem: NEUROSENSORY - ADULT  Goal: Achieves stable or improved neurological status  Description: INTERVENTIONS  - Monitor and report changes in neurological status  - Monitor vital signs such as temperature, blood pressure, glucose, and any other labs ordered   - Initiate measures to prevent increased intracranial pressure  - Monitor  for seizure activity and implement precautions if appropriate      Outcome: Progressing  Goal: Remains free of injury related to seizures activity  Description: INTERVENTIONS  - Maintain airway, patient safety  and administer oxygen as ordered  - Monitor patient for seizure activity, document and report duration and description of seizure to physician/advanced practitioner  - If seizure occurs,  ensure patient safety during seizure  - Reorient patient post seizure  - Seizure pads on all 4 side rails  - Instruct patient/family to notify RN of any seizure activity including if an aura is experienced  - Instruct patient/family to call for assistance with activity based on nursing assessment  - Administer anti-seizure medications if ordered    Outcome: Progressing  Goal: Achieves maximal functionality and self care  Description: INTERVENTIONS  - Monitor swallowing and airway patency with patient fatigue and changes in neurological status  - Encourage and assist patient to increase activity and self care.   - Encourage visually impaired, hearing impaired and aphasic patients to use assistive/communication devices  Outcome: Progressing     Problem: CARDIOVASCULAR - ADULT  Goal: Maintains optimal cardiac output and hemodynamic stability  Description: INTERVENTIONS:  - Monitor I/O, vital signs and rhythm  - Monitor for S/S and trends of decreased cardiac output  - Administer and titrate ordered vasoactive medications to optimize hemodynamic stability  - Assess quality of pulses, skin color and temperature  - Assess for signs of decreased coronary artery perfusion  - Instruct patient to report change in severity of symptoms  Outcome: Progressing  Goal: Absence of cardiac dysrhythmias or at baseline rhythm  Description: INTERVENTIONS:  - Continuous cardiac monitoring, vital signs, obtain 12 lead EKG if ordered  - Administer antiarrhythmic and heart rate control medications as ordered  - Monitor electrolytes and administer  replacement therapy as ordered  Outcome: Progressing     Problem: RESPIRATORY - ADULT  Goal: Achieves optimal ventilation and oxygenation  Description: INTERVENTIONS:  - Assess for changes in respiratory status  - Assess for changes in mentation and behavior  - Position to facilitate oxygenation and minimize respiratory effort  - Oxygen administered by appropriate delivery if ordered  - Initiate smoking cessation education as indicated  - Encourage broncho-pulmonary hygiene including cough, deep breathe, Incentive Spirometry  - Assess the need for suctioning and aspirate as needed  - Assess and instruct to report SOB or any respiratory difficulty  - Respiratory Therapy support as indicated  Outcome: Progressing     Problem: GASTROINTESTINAL - ADULT  Goal: Minimal or absence of nausea and/or vomiting  Description: INTERVENTIONS:  - Administer IV fluids if ordered to ensure adequate hydration  - Maintain NPO status until nausea and vomiting are resolved  - Nasogastric tube if ordered  - Administer ordered antiemetic medications as needed  - Provide nonpharmacologic comfort measures as appropriate  - Advance diet as tolerated, if ordered  - Consider nutrition services referral to assist patient with adequate nutrition and appropriate food choices  Outcome: Progressing  Goal: Maintains or returns to baseline bowel function  Description: INTERVENTIONS:  - Assess bowel function  - Encourage oral fluids to ensure adequate hydration  - Administer IV fluids if ordered to ensure adequate hydration  - Administer ordered medications as needed  - Encourage mobilization and activity  - Consider nutritional services referral to assist patient with adequate nutrition and appropriate food choices  Outcome: Progressing  Goal: Maintains adequate nutritional intake  Description: INTERVENTIONS:  - Monitor percentage of each meal consumed  - Identify factors contributing to decreased intake, treat as appropriate  - Assist with meals as  needed  - Monitor I&O, weight, and lab values if indicated  - Obtain nutrition services referral as needed  Outcome: Progressing  Goal: Establish and maintain optimal ostomy function  Description: INTERVENTIONS:  - Assess bowel function  - Encourage oral fluids to ensure adequate hydration  - Administer IV fluids if ordered to ensure adequate hydration   - Administer ordered medications as needed  - Encourage mobilization and activity  - Nutrition services referral to assist patient with appropriate food choices  - Assess stoma site  - Consider wound care consult   Outcome: Progressing  Goal: Oral mucous membranes remain intact  Description: INTERVENTIONS  - Assess oral mucosa and hygiene practices  - Implement preventative oral hygiene regimen  - Implement oral medicated treatments as ordered  - Initiate Nutrition services referral as needed  Outcome: Progressing     Problem: GENITOURINARY - ADULT  Goal: Maintains or returns to baseline urinary function  Description: INTERVENTIONS:  - Assess urinary function  - Encourage oral fluids to ensure adequate hydration if ordered  - Administer IV fluids as ordered to ensure adequate hydration  - Administer ordered medications as needed  - Offer frequent toileting  - Follow urinary retention protocol if ordered  Outcome: Progressing  Goal: Absence of urinary retention  Description: INTERVENTIONS:  - Assess patient’s ability to void and empty bladder  - Monitor I/O  - Bladder scan as needed  - Discuss with physician/AP medications to alleviate retention as needed  - Discuss catheterization for long term situations as appropriate  Outcome: Progressing  Goal: Urinary catheter remains patent  Description: INTERVENTIONS:  - Assess patency of urinary catheter  - If patient has a chronic krishnan, consider changing catheter if non-functioning  - Follow guidelines for intermittent irrigation of non-functioning urinary catheter  Outcome: Progressing     Problem: METABOLIC, FLUID AND  ELECTROLYTES - ADULT  Goal: Electrolytes maintained within normal limits  Description: INTERVENTIONS:  - Monitor labs and assess patient for signs and symptoms of electrolyte imbalances  - Administer electrolyte replacement as ordered  - Monitor response to electrolyte replacements, including repeat lab results as appropriate  - Instruct patient on fluid and nutrition as appropriate  Outcome: Progressing  Goal: Fluid balance maintained  Description: INTERVENTIONS:  - Monitor labs   - Monitor I/O and WT  - Instruct patient on fluid and nutrition as appropriate  - Assess for signs & symptoms of volume excess or deficit  Outcome: Progressing  Goal: Glucose maintained within target range  Description: INTERVENTIONS:  - Monitor Blood Glucose as ordered  - Assess for signs and symptoms of hyperglycemia and hypoglycemia  - Administer ordered medications to maintain glucose within target range  - Assess nutritional intake and initiate nutrition service referral as needed  Outcome: Progressing     Problem: SKIN/TISSUE INTEGRITY - ADULT  Goal: Skin Integrity remains intact(Skin Breakdown Prevention)  Description: Assess:  -Perform Frank assessment every shift  -Clean and moisturize skin every shift/ as needed  -Inspect skin when repositioning, toileting, and assisting with ADLS  -Assess under medical devices such as lines every hour  -Assess extremities for adequate circulation and sensation     Bed Management:  -Have minimal linens on bed & keep smooth, unwrinkled  -Change linens as needed when moist or perspiring  -Avoid sitting or lying in one position for more than 2 hours while in bed  -Keep HOB at 30 degrees     Toileting:  -Offer bedside commode    Activity:  -Mobilize patient 3 times a day  -Encourage activity and walks on unit  -Encourage or provide ROM exercises   -Turn and reposition patient every 2 Hours  -Use appropriate equipment to lift or move patient in bed  -Instruct/ Assist with weight shifting every 30  when out of bed in chair  -Consider limitation of chair time 2 hour intervals    Skin Care:  -Avoid use of baby powder, tape, friction and shearing, hot water or constrictive clothing  -Relieve pressure over bony prominences using Allevyn  -Do not massage red bony areas    Next Steps:  -Teach patient strategies to minimize risks such as weight shifting   -Consider consults to  interdisciplinary teams such as wound care, OT/PT  Outcome: Progressing  Goal: Incision(s), wounds(s) or drain site(s) healing without S/S of infection  Description: INTERVENTIONS  - Assess and document dressing, incision, wound bed, drain sites and surrounding tissue  - Provide patient and family education  - Perform skin care/dressing changes every shift/as ordered/as needed  Outcome: Progressing  Goal: Pressure injury heals and does not worsen  Description: Interventions:  - Implement low air loss mattress or specialty surface (Criteria met)  - Apply silicone foam dressing  - Instruct/assist with weight shifting every 30 minutes when in chair   - Limit chair time to 2 hour intervals  - Use special pressure reducing interventions such as cushion when in chair   - Apply fecal or urinary incontinence containment device   - Perform passive or active ROM every hour  - Turn and reposition patient & offload bony prominences every 2 hours   - Utilize friction reducing device or surface for transfers   - Consider consults to  interdisciplinary teams such as wound care  - Consider nutrition services referral as needed  Outcome: Progressing     Problem: HEMATOLOGIC - ADULT  Goal: Maintains hematologic stability  Description: INTERVENTIONS  - Assess for signs and symptoms of bleeding or hemorrhage  - Monitor labs  - Administer supportive blood products/factors as ordered and appropriate  Outcome: Progressing     Problem: MUSCULOSKELETAL - ADULT  Goal: Maintain or return mobility to safest level of function  Description: INTERVENTIONS:  - Assess  patient's ability to carry out ADLs; assess patient's baseline for ADL function and identify physical deficits which impact ability to perform ADLs (bathing, care of mouth/teeth, toileting, grooming, dressing, etc.)  - Assess/evaluate cause of self-care deficits   - Assess range of motion  - Assess patient's mobility  - Assess patient's need for assistive devices and provide as appropriate  - Encourage maximum independence but intervene and supervise when necessary  - Involve family in performance of ADLs  - Assess for home care needs following discharge   - Consider OT consult to assist with ADL evaluation and planning for discharge  - Provide patient education as appropriate  Outcome: Progressing  Goal: Maintain proper alignment of affected body part  Description: INTERVENTIONS:  - Support, maintain and protect limb and body alignment  - Provide patient/ family with appropriate education  Outcome: Progressing     Problem: Nutrition/Hydration-ADULT  Goal: Nutrient/Hydration intake appropriate for improving, restoring or maintaining nutritional needs  Description: Monitor and assess patient's nutrition/hydration status for malnutrition. Collaborate with interdisciplinary team and initiate plan and interventions as ordered.  Monitor patient's weight and dietary intake as ordered or per policy. Utilize nutrition screening tool and intervene as necessary. Determine patient's food preferences and provide high-protein, high-caloric foods as appropriate.     INTERVENTIONS:  - Monitor oral intake, urinary output, labs, and treatment plans  - Assess nutrition and hydration status and recommend course of action  - Evaluate amount of meals eaten  - Assist patient with eating if necessary   - Allow adequate time for meals  - Recommend/ encourage appropriate diets, oral nutritional supplements, and vitamin/mineral supplements  - Order, calculate, and assess calorie counts as needed  - Recommend, monitor, and adjust tube  feedings and TPN/PPN based on assessed needs  - Assess need for intravenous fluids  - Provide specific nutrition/hydration education as appropriate  - Include patient/family/caregiver in decisions related to nutrition  Outcome: Progressing

## 2024-01-14 NOTE — ASSESSMENT & PLAN NOTE
Resolved  Patient presented to the ER 1/7/24 with fatigue and hypoxia, suspected source of bacterial pneumonia   Likely component of adrenal insufficiency given chronic, daily prednisone use   Not given 30ml/kg IVF resuscitation in the setting of anuric ESRD      Plan:  Maintain MAP 60-65 given ESRD  Tolerating volume removal with CRRT  Continue Cefepime

## 2024-01-14 NOTE — ASSESSMENT & PLAN NOTE
Lab Results   Component Value Date    HGBA1C 7.7 (H) 01/07/2024    HGBA1C 6.5 (H) 02/23/2018   Anticipate hyperglycemia associated with higher steroid dose    Plan:   Continue AC/HS sliding scale algorithm   Lispro 8 units TID with meals  Lantus 10 units q12h   Adjust insulin regimen as needed to maintain goal -180  Carb controlled diet   Patient intermittently non-complaint with Insulin

## 2024-01-14 NOTE — PROGRESS NOTES
Procedure Note - Nephrology   Berto Faria 53 y.o. male MRN: [id number]  Unit/Bed#: [facility] 515-01 Encounter: [id number]      Assessment / Plan:  ESRD on HD TTS at [facility] -patient was on CRRT in light of cardiogenic shock and hypotension initially on pressors, now off pressors. Patient transitioned to IHD but back on CRRT as below  -Patient due for hemodialysis 1/11 but held due to rapid afib with hypotension. Transferred to ICU. Started back on CRRT 1/11/24. Now again on pressors with sudden recent drop in Hgb to 4.9  -Target weight 47.5 kg outpatient  -continue CRRT, running even UF now  -Patient seen and examined by me on CRRT today, 4K/3cal bath, even UF  -monitor serial labs  -consider transition back to IHD once off pressors x 24 hrs  Access-AV fistula present and functioning, now with permacath-continue CRRT with this  Anemia due to other ESRD-on Mircera outpatient, Hgb below goal at 7.2 as of today, but as low as 4.9, receiving 2u blood transfusion, GI on board for hematochezia, for EGD and flex sig per GI today  Secondary hyperparathyroidism renal origin-continue Cinacalcet 30 mg daily, calcitriol 0.5 mcg 3 times weekly, monitor PTH outpatient  Hypophosphatemia - resolved s/p repletion, monitor serial phos  History of failed kidney transplant-on azathioprine and tacrolimus as an outpatient, azathioprine on hold, continue tacrolimus home dose for now.  Should have these down titrated per outpatient nephrologist at dialysis unit.  Patient also chronically on prednisone 5mg daily  Cardiogenic versus septic shock in setting of multifocal pneumonia-on antibiotics per critical care team  Elevated troponin-cardiology following, s/p LHC 1/12 with multivessel CAD, porcelain aorta noted, CT surgery consulted but no plans for surgery, cardio considering high risk PCI but no plans now in light of hematochezia  Acute on chronic diastolic CHF-UF as tolerated on CRRT, continue oral fluid restriction, cardio  "follows  New onset rapid afib - management per cardiology, off heparin gtt in light of GIB concerns  Non gapped acidosis d/t ESRD-  correct with CRRT, resolved        Subjective:   Patient seen and examined by me on CRRT at approximate 11:46 AM.  Patient remains on Levophed drip, receiving blood transfusion, goal UF even, blood pressure 82/47. He denies chest pain or SOB.      Objective:     Vitals: Blood pressure 91/54, pulse 83, temperature 98.3 °F (36.8 °C), temperature source Oral, resp. rate (!) 23, height 5' 4\" (1.626 m), weight 48.1 kg (106 lb 0.7 oz), SpO2 100%.,Body mass index is 18.2 kg/m².Temp (24hrs), Av.8 °F (36.6 °C), Min:97.4 °F (36.3 °C), Max:98.3 °F (36.8 °C)      Weight (last 2 days)       Date/Time Weight    24 0600 48.1 (106.04)    24 0600 46.6 (102.73)    24 0508 46.8 (103.18)              Intake/Output Summary (Last 24 hours) at 2024 1405  Last data filed at 2024 1200  Gross per 24 hour   Intake 2482.24 ml   Output 2169 ml   Net 313.24 ml     I/O last 24 hours:  In: 3289.2 [P.O.:460; I.V.:918.2; Blood:700; IV Piggyback:1211]  Out: 3268 [Other:3268]        Physical Exam:   Physical Exam  Vitals reviewed.   Constitutional:       General: He is not in acute distress.     Appearance: He is well-developed. He is not diaphoretic.   HENT:      Head: Normocephalic and atraumatic.      Nose: Nose normal.      Mouth/Throat:      Mouth: Mucous membranes are moist.      Pharynx: No oropharyngeal exudate.   Eyes:      General: No scleral icterus.        Right eye: No discharge.         Left eye: No discharge.   Neck:      Thyroid: No thyromegaly.   Cardiovascular:      Rate and Rhythm: Normal rate and regular rhythm.      Heart sounds: Normal heart sounds.   Pulmonary:      Effort: Pulmonary effort is normal.      Breath sounds: Normal breath sounds. No wheezing or rales.   Abdominal:      General: Bowel sounds are normal. There is no distension.      Palpations: Abdomen is " soft.      Tenderness: There is no abdominal tenderness.   Musculoskeletal:         General: No swelling.      Cervical back: Neck supple.   Lymphadenopathy:      Cervical: No cervical adenopathy.   Skin:     General: Skin is warm and dry.      Coloration: Skin is not jaundiced.      Findings: No rash.   Neurological:      General: No focal deficit present.      Mental Status: He is alert.      Comments: awake   Psychiatric:         Behavior: Behavior normal.         Invasive Devices       Peripheral Intravenous Line  Duration             Peripheral IV 01/13/24 Left;Upper;Ventral (anterior) Arm 1 day    Peripheral IV 01/14/24 Left;Upper Arm <1 day              Arterial Line  Duration             Arterial Line 01/11/24 Left Radial 2 days              Line  Duration             Hemodialysis AV Fistula Right Upper arm -- days              Hemodialysis Catheter  Duration             HD Permanent Double Catheter 5 days                    Medications:    Scheduled Meds:  Current Facility-Administered Medications   Medication Dose Route Frequency Provider Last Rate    acetaminophen  650 mg Oral Q6H PRN Rosalva Taylor PA-C      albuterol  2 puff Inhalation Q4H PRN GAVINO Baker      amiodarone (CORDARONE) 900 mg in dextrose 5 % 500 mL infusion  0.5 mg/min Intravenous Continuous GAVINO Baker 0.5 mg/min (01/13/24 1800)    amiodarone  200 mg Oral TID With Meals GAVINO Baker      aspirin  81 mg Oral QAM GAVINO Baker      atorvastatin  80 mg Oral Daily With Dinner GAVINO Baker      calcitriol  0.5 mcg Oral Once per day on Tuesday Thursday Saturday GAVINO Baker      cefepime  1,000 mg Intravenous Q12H GAVINO Baker 1,000 mg (01/14/24 1000)    cinacalcet  30 mg Oral Daily GAVINO Baker      glycerin-hypromellose-  1 drop Right Eye Q4H PRN GAVINO Baker      insulin glargine  10 Units Subcutaneous Q12H JAMARCUS GAVINO Baker      insulin  lispro  1-6 Units Subcutaneous HS Katie Grecsek, CRNP      insulin lispro  1-6 Units Subcutaneous TID With Meals Katie Rose Mariecsek, CRNP      insulin lispro  8 Units Subcutaneous TID With Meals Katie Rose Mariecsek, CRNP      norepinephrine  1-30 mcg/min Intravenous Titrated Andre Mendoza PA-C 2 mcg/min (01/14/24 0900)    NxStage K 4/Ca 3  20,000 mL Dialysis Continuous Wendy K Doug, DO      ondansetron  4 mg Intravenous Q8H PRN Katie Grecsek, CRNP      pantoprazole  40 mg Intravenous Q12H JAMARCUS Andre Mendoza PA-C      predniSONE  5 mg Oral Daily Katie Grecsek, GAVINO      tacrolimus  1 mg Oral QPM Katie Grecsek, CRTANNER      tacrolimus  2 mg Oral QAM Katie Grecsek, CRNP       Facility-Administered Medications Ordered in Other Encounters   Medication Dose Route Frequency Provider Last Rate    midazolam   Intravenous PRN Abril Allen CRNA         PRN Meds:.  acetaminophen    albuterol    glycerin-hypromellose-    ondansetron    Continuous Infusions:amiodarone (CORDARONE) 900 mg in dextrose 5 % 500 mL infusion, 0.5 mg/min, Last Rate: 0.5 mg/min (01/13/24 1800)  norepinephrine, 1-30 mcg/min, Last Rate: 2 mcg/min (01/14/24 0900)  NxStage K 4/Ca 3, 20,000 mL            LAB RESULTS:      Results from last 7 days   Lab Units 01/14/24  1001 01/14/24  0338 01/14/24  0336 01/13/24  2343 01/13/24  2316 01/13/24  2238 01/13/24  1555 01/13/24  0901 01/13/24  0243 01/13/24  0223 01/13/24  0212 01/12/24  1452 01/12/24  1209 01/12/24  0844 01/12/24  0431 01/11/24  1455 01/11/24  1354 01/11/24  0531 01/10/24  0336 01/09/24  1005 01/09/24  0334 01/08/24  1628 01/08/24  0429   WBC Thousand/uL  --  11.80*  --   --  13.65*  --   --   --   --  8.93  --   --   --   --  10.09  --  12.97* 10.99* 11.39*  --  13.49*  --  13.80*   HEMOGLOBIN g/dL 7.2* 7.2*  --  5.3* 4.9*  --   --   --   --  7.1*  --   --  7.8*  --  7.8*  --  9.3* 9.8* 8.9*  --  8.9*  --  10.3*   HEMATOCRIT %  --  22.0*  --   --  15.1*  --   --   --   --  21.6*  --    --   --   --  22.9*  --  28.3* 30.3* 27.8*  --  27.6*  --  32.2*   PLATELETS Thousands/uL  --  150  --   --  187  --   --   --   --  170  --   --   --   --  198  --  237 194 197  --  187  --  215   NEUTROS PCT %  --   --   --   --   --   --   --   --   --  58  --   --   --   --  61  --   --   --   --   --  83*  --   --    LYMPHS PCT %  --   --   --   --   --   --   --   --   --  28  --   --   --   --  26  --   --   --   --   --  9*  --   --    LYMPHO PCT %  --   --   --   --   --   --   --   --   --   --   --   --   --   --   --   --   --   --   --   --   --   --  12*   MONOS PCT %  --   --   --   --   --   --   --   --   --  11  --   --   --   --  10  --   --   --   --   --  7  --   --    MONO PCT %  --   --   --   --   --   --   --   --   --   --   --   --   --   --   --   --   --   --   --   --   --   --  9   EOS PCT %  --   --   --   --   --   --   --   --   --  2  --   --   --   --  2  --   --   --   --   --  0  --  0   POTASSIUM mmol/L 4.5  --  4.7  --  4.4 4.5 4.5 4.1  --   --  4.7   < >  --    < >  --    < >  --  5.2 4.5   < >  --    < > 4.6   CHLORIDE mmol/L 105  --  105  --  105 104 102 103  --   --  102   < >  --    < >  --    < >  --  96 100   < >  --    < > 95*   CO2 mmol/L 22  --  20*  --  23 24 23 24  --   --  21   < >  --    < >  --    < >  --  18* 25   < >  --    < > 23   BUN mg/dL 27*  --  30*  --  23 23 26* 27*  --   --  30*   < >  --    < >  --    < >  --  81* 51*   < >  --    < > 47*   CREATININE mg/dL 2.14*  --  2.39*  --  1.92* 2.02* 2.08* 2.27*  --   --  2.51*   < >  --    < >  --    < >  --  7.18* 4.92*   < >  --    < > 7.05*   CALCIUM mg/dL 8.6  --  8.0*  --  8.3* 8.6 8.7 8.7  --   --  8.7   < >  --    < >  --    < >  --  8.7 8.8   < >  --    < > 8.9   ALK PHOS U/L  --   --   --   --   --   --   --   --  56  --   --   --   --   --  57  --   --   --   --   --   --   --   --    ALT U/L  --   --   --   --   --   --   --   --  13  --   --   --   --   --  12  --   --   --   --   --   --   --    "--    AST U/L  --   --   --   --   --   --   --   --  19  --   --   --   --   --  18  --   --   --   --   --   --   --   --    MAGNESIUM mg/dL 2.1  --  1.9  --  1.9 2.0 2.2 1.9  --   --  2.1   < >  --    < > 2.1   < >  --  3.2* 2.1   < >  --    < > 2.3   PHOSPHORUS mg/dL 2.4*  --  3.2  --  2.6* 2.7 2.5* 1.8*  --   --  2.4*   < >  --    < > 2.8   < >  --  3.4 3.1   < >  --    < >  --     < > = values in this interval not displayed.       CUTURES:  Lab Results   Component Value Date    BLOODCX No Growth After 5 Days. 01/08/2024    BLOODCX No Growth After 5 Days. 01/08/2024    BLOODCX No Growth After 5 Days. 01/07/2024    BLOODCX No Growth After 5 Days. 01/07/2024    BLOODCX No Growth After 5 Days. 04/27/2023    BLOODCX No Growth After 5 Days. 04/27/2023    BLOODCX No Growth After 5 Days. 10/10/2022    BLOODCX No Growth After 5 Days. 10/10/2022    URINECX No Growth <1000 cfu/mL 02/23/2019    URINECX No Growth <1000 cfu/mL 07/27/2016                 Portions of the record may have been created with voice recognition software. Occasional wrong word or \"sound a like\" substitutions may have occurred due to the inherent limitations of voice recognition software. Read the chart carefully and recognize, using context, where substitutions have occurred.If you have any questions, please contact the dictating provider.    "

## 2024-01-14 NOTE — ASSESSMENT & PLAN NOTE
Presented to [facility] ED with shortness of breath. Troponin and EKG checked as part of initial workup. EKG without acute ischemic changes   NSTEMI likely related to increased demand from acute hypoxic respiratory failure and septic shock   Troponin 70658 > 29822 > 58365   Cardiac Cath 1/12:Severe in-stent restenosis bifurcation ostial LAD/LCx,  RCA, elevated LVEDP, porcelain aorta.    Plan:  ASA 81mg daily   Heparin Held in setting of GIB  Atorvastatin 80mg qHS   Continue holding home afterload reduction agents/AV aubrie blockers given resolving septic shock:   Carvedilol 6.25mg BID  Isosorbide mononitrate 30mg q24h   Metoprolol succinate 50mg q24  Nifedipine 30mg q24 hours   Cardiology consulted, appreciate recommendations- ?  High risk PCI in the future   CT Surgery Consulted-recommendations for supportive care or high risk PCI

## 2024-01-14 NOTE — ASSESSMENT & PLAN NOTE
Presented 23 to [facility] with new oxygen requirement, fatigue   Relevant imagin/7/24 CT Chest: Bilateral multilevel airspace consolidation, groundglass opacities, septal thickening and small bilateral pleural effusions secondary to volume overloaded as well as pneumonia.  Initial procalcitonin: 3.16 (24)  Possibly falsely elevated due to ESRD, but had lower levels in the past   Cultures:  24 BCXx2: No growth  23 COVID/Flu/RSV: Negative   23 RP2: Negative   24 MRSA Cx: Negative    24 BCXx2: No growth     Plan:   Current Antibiotics: Cefepime to complete coure   Continue to monitor fever and WBC curve   Follow up cultures

## 2024-01-14 NOTE — ASSESSMENT & PLAN NOTE
As evidenced by low ScvO2, cool extremities, and newly depressed/worsening EF on STAT echo 01/11.     01/11 Echo:    Left Ventricle: Left ventricular cavity size is mildly dilated. Wall thickness is normal. There is eccentric hypertrophy. The left ventricular ejection fraction is 32%. Systolic function is severely reduced. There is severe global hypokinesis with specific regional wall abnormalities in the anteroseptal, inferior and apical regions. Diastolic function is abnormal.    Left Atrium: The atrium is moderately dilated.    Mitral Valve: There is mild to moderate regurgitation.    Tricuspid Valve: There is mild to moderate regurgitation. The right ventricular systolic pressure is moderately elevated. The estimated right ventricular systolic pressure is 57.00 mmHg.    Plan:  Continue milrinone and titrate based on clinical exam  Monitor endpoints of resuscitation  Continuous cardiopulmonary monitoring  Continue CRRT for volume management

## 2024-01-14 NOTE — ANESTHESIA PREPROCEDURE EVALUATION
Procedure:  EGD  FLEXIBLE SIGMOIDOSCOPY    Relevant Problems   CARDIO   (+) Coronary artery disease involving native coronary artery of native heart without angina pectoris   (+) Hypertension   (+) Mitral valve insufficiency   (+) NSTEMI (non-ST elevated myocardial infarction) (HCC)   (+) New onset a-fib (HCC)      ENDO   (+) Type 1 diabetes mellitus on insulin therapy (HCC)      GI/HEPATIC   (+) GI bleed   (+) Pancreatic lesion      /RENAL   (+) Chronic kidney disease, unspecified   (+) ESRD (end stage renal disease) (HCC)   (+) Renal transplant failure and rejection      HEMATOLOGY   (+) Acute blood loss anemia   (+) Acute on chronic anemia      PULMONARY   (+) Acute respiratory failure with hypoxia (HCC)   (+) Community acquired pneumonia of right lower lobe of lung   (+) Multifocal pneumonia      Other   (+) Chronic osteomyelitis of tibia (HCC)   (+) Osteomyelitis (HCC)      Lab Results   Component Value Date     11/06/2015    SODIUM 135 01/14/2024    K 4.5 01/14/2024     01/14/2024    CO2 22 01/14/2024    ANIONGAP 11 11/06/2015    AGAP 8 01/14/2024    BUN 27 (H) 01/14/2024    CREATININE 2.14 (H) 01/14/2024    GLUC 180 (H) 01/14/2024    GLUF 103 (H) 10/01/2019    CALCIUM 8.6 01/14/2024    AST 19 01/13/2024    ALT 13 01/13/2024    ALKPHOS 56 01/13/2024    PROT 6.8 11/06/2015    TP 5.6 (L) 01/13/2024    BILITOT 0.45 11/06/2015    TBILI 0.90 01/13/2024    EGFR 33 01/14/2024     Lab Results   Component Value Date    WBC 11.80 (H) 01/14/2024    HGB 7.2 (L) 01/14/2024    HCT 22.0 (L) 01/14/2024    MCV 98 01/14/2024     01/14/2024 1/12/24 LHC  Impression    Ost LAD lesion is 95% stenosed.    Ost Cx to Prox Cx lesion is 75% stenosed.    Prox RCA lesion is 100% stenosed.    Mid LAD lesion is 60% stenosed.    Dist LM lesion is 50% stenosed.    1st Mrg lesion is 100% stenosed.  Severe in-stent restenosis bifurcation ostial LAD/LCX  Occluded RCA ()  Moderately elevated LVEDP  Porcelain aorta      Recommendation  OP-CAB [place] -LAD/staged pci LCX?  Prohibitive risk for PCI LAD-LCX (plus already restenosis)     1/8/24 TTE  Left Ventricle Left ventricular cavity size is normal. Wall thickness is mildly increased. The left ventricular ejection fraction is 40%. Systolic function is moderately reduced. Left atrial filling pressure is elevated.   Wall Scoring Baseline     The following segments are hypokinetic: mid anteroseptal, mid inferior, apical anterior, apical septal, apical inferior and apex.  All other segments are normal.            Right Ventricle Right ventricular cavity size is normal. Systolic function is normal.   Aortic Valve The aortic valve is trileaflet. The leaflets are not thickened. The leaflets are not calcified. The leaflets exhibit normal mobility. There is no evidence of regurgitation. The aortic valve has no significant stenosis.   Mitral Valve Mitral valve structure is normal.  There is mild regurgitation. There is no evidence of stenosis.   Tricuspid Valve Tricuspid valve structure is normal. There is mild regurgitation. There is no evidence of stenosis.   IVC/SVC The inferior vena cava is dilated. Respirophasic changes were blunted (less than 50% variation).   Pericardium There is no pericardial effusion.     Invasive Devices       Peripheral Intravenous Line  Duration             Peripheral IV 01/13/24 Left;Upper;Ventral (anterior) Arm 1 day    Peripheral IV 01/14/24 Left;Upper Arm <1 day              Arterial Line  Duration             Arterial Line 01/11/24 Left Radial 2 days              Line  Duration             Hemodialysis AV Fistula Right Upper arm -- days              Hemodialysis Catheter  Duration             HD Permanent Double Catheter 5 days                      Physical Exam    Airway    Mallampati score: II  TM Distance: >3 FB  Neck ROM: full     Dental        Cardiovascular      Pulmonary      Other Findings        Anesthesia Plan  ASA Score- 4 Emergent    Anesthesia  Type- IV sedation with anesthesia with ASA Monitors.         Additional Monitors:     Airway Plan:            Plan Factors-Exercise tolerance (METS): <4 METS.    Chart reviewed. EKG reviewed. Imaging results reviewed. Existing labs reviewed. Patient summary reviewed.                  Induction- intravenous.    Postoperative Plan- Plan for postoperative opioid use.     Informed Consent- Anesthetic plan and risks discussed with patient.  I personally reviewed this patient with the CRNA. Discussed and agreed on the Anesthesia Plan with the CRNA..

## 2024-01-14 NOTE — ASSESSMENT & PLAN NOTE
Secondary to chronic disease and ESRD  Baseline hemoglobin: 9.6 - 11.8  Acute hypotension anemia on 1/13 requiring transfusion  Blood products:  1/13: 2 unit PRBC CMV negative, irradiated    Plan:   Transfuse as indicated in the setting of hemodynamic instability or signs of active bleeding  Monitor Frequent Hg  Plan as above due to GIB

## 2024-01-14 NOTE — ASSESSMENT & PLAN NOTE
New-onset in AM of 1/11. With occasional RVR with associated hypotension. Amiodarone bolus x1 given at RRT while in dialysis 1/11 AM.    Plan:  Currently NSR  Continue PO amiodarone infusion, consider discontinuing drip today  Heparin ACS/low for AF

## 2024-01-14 NOTE — PROGRESS NOTES
[facility]  Progress Note  Name: Berto Faria I  MRN: [id number]  Unit/Bed#: [facility] 515-01 I Date of Admission: 1/8/2024   Date of Service: 1/14/2024 I Hospital Day: 6    Assessment/Plan   * Cardiogenic shock (HCC)  Assessment & Plan  As evidenced by low ScvO2, cool extremities, and newly depressed/worsening EF on STAT echo 01/11.     01/11 Echo:    Left Ventricle: Left ventricular cavity size is mildly dilated. Wall thickness is normal. There is eccentric hypertrophy. The left ventricular ejection fraction is 32%. Systolic function is severely reduced. There is severe global hypokinesis with specific regional wall abnormalities in the anteroseptal, inferior and apical regions. Diastolic function is abnormal.    Left Atrium: The atrium is moderately dilated.    Mitral Valve: There is mild to moderate regurgitation.    Tricuspid Valve: There is mild to moderate regurgitation. The right ventricular systolic pressure is moderately elevated. The estimated right ventricular systolic pressure is 57.00 mmHg.    Plan:  Continue milrinone and titrate based on clinical exam  Monitor endpoints of resuscitation  Continuous cardiopulmonary monitoring  Continue CRRT for volume management    Acute respiratory failure with hypoxia (HCC)  Assessment & Plan  Secondary to likely bacterial pneumonia and element of volume overload   1/7/24 CT Chest: Bilateral multilevel airspace consolidation, groundglass opacities, septal thickening and small bilateral pleural effusions secondary to volume overloaded as well as pneumonia.      Plan:   Wean supplemental oxygen as able to maintain SpO2 > 90%   Continue volume removal with CRRT  Antibiotic plan detailed above    Continue pulmonary hygiene. Incentive spirometer q1h while awake, encourage coughing and deep breathing. Upright positioning  Suction as needed and closely monitor secretions. Maintain HOB >30 degrees. Q4h oral care with chlorhexidine  BID      GI bleed  Assessment & Plan  Acute loss anemia present on 1/13 with associated hypotension.  Subsequent large melanotic stool.  Patient does have history of GI bleed and previous hospitalization, required intervention in 2019 at [facility]  Hemoglobin 4.9, Currently receiving 2u pRBC  Plan:  Initiate Protonix 40 twice daily  Continue to monitor frequent hemoglobin  Continue monitor hemodynamics, consider transfusion/hemoglobin recheck with any decline  GI consultation    NSTEMI (non-ST elevated myocardial infarction) (HCC)  Assessment & Plan  Presented to [facility] ED with shortness of breath. Troponin and EKG checked as part of initial workup. EKG without acute ischemic changes   NSTEMI likely related to increased demand from acute hypoxic respiratory failure and septic shock   Troponin 82541 > 81595 > 45621   Cardiac Cath 1/12:Severe in-stent restenosis bifurcation ostial LAD/LCx,  RCA, elevated LVEDP, porcelain aorta.    Plan:  ASA 81mg daily   Heparin Held in setting of GIB  Atorvastatin 80mg qHS   Continue holding home afterload reduction agents/AV aubrie blockers given resolving septic shock:   Carvedilol 6.25mg BID  Isosorbide mononitrate 30mg q24h   Metoprolol succinate 50mg q24  Nifedipine 30mg q24 hours   Cardiology consulted, appreciate recommendations- ?  High risk PCI in the future   CT Surgery Consulted-recommendations for supportive care or high risk PCI    New onset a-fib (HCC)  Assessment & Plan  New-onset in AM of 1/11. With occasional RVR with associated hypotension. Amiodarone bolus x1 given at RRT while in dialysis 1/11 AM.    Plan:  Currently NSR  Continue PO amiodarone infusion, consider discontinuing drip today  Heparin ACS/low for AF    Acute on chronic diastolic (congestive) heart failure (HCC)  Assessment & Plan  Likely related to NSTEMI from septic shock and element of stress cardiomyopathy, but cannot rule about possible ischemic element given regional wall changes   1/7/24 TTE: LV  cavity size is normal. Wall thickness is mildly increased. LVEF 40%. Systolic function is moderately reduced. Following segments are hypokinetic: mid anteroseptal, mid inferior, apical anterior, apical septal, apical inferior and apex.RV normal. Mild MR. Mild TR    Plan:   Milrinone for cardiogenic shock as above  Volume removal with CRRT  Daily weights  Continue holding home afterload reduction agents/AV aubrie blockers given resolving septic shock:   Carvedilol 6.25mg BID  Isosorbide mononitrate 30mg q24h   Metoprolol succinate 50mg q24  Nifedipine 30mg q24 hours   1000mL fluid restriction   Meds as above for NSTEMI  Cardiology consulted, appreciate recommendations      Multifocal pneumonia  Assessment & Plan  Presented 23 to [facility] with new oxygen requirement, fatigue   Relevant imagin/7/24 CT Chest: Bilateral multilevel airspace consolidation, groundglass opacities, septal thickening and small bilateral pleural effusions secondary to volume overloaded as well as pneumonia.  Initial procalcitonin: 3.16 (24)  Possibly falsely elevated due to ESRD, but had lower levels in the past   Cultures:  24 BCXx2: No growth  23 COVID/Flu/RSV: Negative   23 RP2: Negative   24 MRSA Cx: Negative    24 BCXx2: No growth     Plan:   Current Antibiotics: Cefepime to complete coure   Continue to monitor fever and WBC curve   Follow up cultures      ESRD (end stage renal disease) ([facility])  Assessment & Plan  Dialysis regimen: Tuesday, Thursday, Saturday via right arm AV fistula   24: Right IJ temporary HD catheter placement   24: CRRT initiated   24: Tolerated -100 to -150 cc/hr volume removal   Re-initiation of CRRT     Plan:   CRRT re-initiated  - continue negative as tolerated  Cinacalcet 30mg daily   Nephrology consulted, appreciate recommendations-possible transition back to IHD on Monday, pending hemodynamics    Septic shock ([facility])  Assessment & Plan  Resolved  Patient presented  to the ER 1/7/24 with fatigue and hypoxia, suspected source of bacterial pneumonia   Likely component of adrenal insufficiency given chronic, daily prednisone use   Not given 30ml/kg IVF resuscitation in the setting of anuric ESRD      Plan:  Maintain MAP 60-65 given ESRD  Tolerating volume removal with CRRT  Continue Cefepime    Type 1 diabetes mellitus on insulin therapy (HCC)  Assessment & Plan  Lab Results   Component Value Date    HGBA1C 7.7 (H) 01/07/2024    HGBA1C 6.5 (H) 02/23/2018   Anticipate hyperglycemia associated with higher steroid dose    Plan:   Continue AC/HS sliding scale algorithm   Lispro 8 units TID with meals  Lantus 10 units q12h   Adjust insulin regimen as needed to maintain goal -180  Carb controlled diet   Patient intermittently non-complaint with Insulin     Renal transplant failure and rejection  Assessment & Plan  History of renal transplant chronically on tacrolimus 1 mg p.o. nightly, 2 mg p.o. every morning, AZA 50 daily, Prednisone 5 Daily    Plan:  Continue tacrolimus 2mg qAM, 1mg qPM  Prednisone 5 mg qD  Holding home azathioprine 50mg daily   Nephrology consulted, appreciate recommendations   Reach out to transplant center for update regarding these medications    Acute on chronic anemia  Assessment & Plan  Secondary to chronic disease and ESRD  Baseline hemoglobin: 9.6 - 11.8  Acute hypotension anemia on 1/13 requiring transfusion  Blood products:  1/13: 2 unit PRBC CMV negative, irradiated    Plan:   Transfuse as indicated in the setting of hemodynamic instability or signs of active bleeding  Monitor Frequent Hg  Plan as above due to GIB    S/P AKA (above knee amputation), right ([facility])  Assessment & Plan  Secondary to chronic osteomyelitis     Plan:   PT/OT when appropriate   Offloading and frequent turns              Disposition: Critical care    ICU Core Measures     A: Assess, Prevent, and Manage Pain Has pain been assessed? Yes  Need for changes to pain regimen? No   B:  Both SAT/SAT  N/A   C: Choice of Sedation RASS Goal: 0 Alert and Calm  Need for changes to sedation or analgesia regimen? No   D: Delirium CAM-ICU: Negative   E: Early Mobility  Plan for early mobility? Yes   F: Family Engagement Plan for family engagement today? Yes       Antibiotic Review: Continue broad spectrum secondary to severity of illness.     Review of Invasive Devices:      Central access plan: Patient has multiple central venous catheters.   Tere Plan: Keep arterial line for hemodynamic monitoring    Prophylaxis:  VTE VTE covered by:  heparin (porcine), Intravenous, Held at 01/14/24 0303  heparin (porcine), Intravenous, 1,500 Units at 01/13/24 1700  heparin (porcine), Intravenous       Stress Ulcer  covered bypantoprazole (PROTONIX) 40 mg tablet [[id number]] (Long-Term Med), pantoprazole (PROTONIX) injection 40 mg [[id number]]         Significant 24hr Events     24hr events: Milrinone decreased back to 0.13 based on clinical exam.  Patient tolerating CRRT with volume removal -75/hour.  Overnight patient with acute episode of hypotension and anemia.  Transfused 2 units PRBC and supported on Levophed.  Noted to have large melanotic stool as well as an episode of peripheral IV bleeding.  Vasopressor requirement significantly improved after transfusion.  Patient started on Protonix 40 twice daily.   Subjective   Review of Systems   Constitutional:  Positive for activity change, appetite change and fatigue.   Respiratory:  Negative for chest tightness and shortness of breath.    Cardiovascular:  Negative for chest pain.   Gastrointestinal:  Negative for abdominal pain.   Genitourinary:  Negative for difficulty urinating, frequency and urgency.   Musculoskeletal:  Negative for arthralgias.   Neurological:  Negative for dizziness and light-headedness.   All other systems reviewed and are negative.     Objective                            Vitals I/O      Most Recent Min/Max in 24hrs   Temp 98.2 °F (36.8 °C) Temp   Min: 97.4 °F (36.3 °C)  Max: 99 °F (37.2 °C)   Pulse 82 Pulse  Min: 80  Max: 94   Resp (!) 28 Resp  Min: 15  Max: 35   BP (!) 89/53 BP  Min: 72/39  Max: 116/56   O2 Sat 98 % SpO2  Min: 95 %  Max: 100 %      Intake/Output Summary (Last 24 hours) at 1/14/2024 0359  Last data filed at 1/14/2024 0323  Gross per 24 hour   Intake 2481 ml   Output 3339 ml   Net -858 ml       Diet Cardiovascular; Sodium 2 GM; Fluid Restriction 1800 ML, Consistent Carbohydrate Diet Level 2 (5 carb servings/75 grams CHO/meal)    Invasive Monitoring           Physical Exam   Physical Exam  Eyes:      Pupils: Pupils are equal, round, and reactive to light.   Skin:     General: Skin is warm.   HENT:      Head: Normocephalic.      Nose: No congestion or rhinorrhea.      Mouth/Throat:      Mouth: Mucous membranes are moist.   Cardiovascular:      Rate and Rhythm: Normal rate and regular rhythm.      Pulses: Normal pulses. Pulses are Warm distal extremity.   Musculoskeletal:      Left lower leg: No edema.   Abdominal:      Palpations: Abdomen is soft.   Constitutional:       Appearance: He is ill-appearing.      Interventions: He is not sedated, not intubated and not restrained.  Pulmonary:      Effort: Pulmonary effort is normal. He is not intubated.      Breath sounds: Normal breath sounds.   Neurological:      General: No focal deficit present.      Mental Status: He is alert and oriented to person, place and time. Mental status is at baseline.      Motor: Strength full and intact in all extremities.   Genitourinary/Anorectal:  Chowdary present.          Diagnostic Studies      EKG: NSR  Imaging:  I have personally reviewed pertinent reports.       Medications:  Scheduled PRN   amiodarone, 200 mg, TID With Meals  aspirin, 81 mg, QAM  atorvastatin, 80 mg, Daily With Dinner  calcitriol, 0.5 mcg, Once per day on Tuesday Thursday Saturday  cefepime, 1,000 mg, Q12H  cinacalcet, 30 mg, Daily  insulin glargine, 10 Units, Q12H JAMARCUS  insulin lispro, 1-6  Units, HS  insulin lispro, 1-6 Units, TID With Meals  insulin lispro, 8 Units, TID With Meals  norepinephrine, ,   pantoprazole, 40 mg, Q12H JAMARCUS  predniSONE, 5 mg, Daily  tacrolimus, 1 mg, QPM  tacrolimus, 2 mg, QAM      acetaminophen, 650 mg, Q6H PRN  albuterol, 2 puff, Q4H PRN  glycerin-hypromellose-, 1 drop, Q4H PRN  heparin (porcine), 1,500 Units, Q6H PRN  heparin (porcine), 3,000 Units, Q6H PRN  norepinephrine, ,   ondansetron, 4 mg, Q8H PRN       Continuous    amiodarone (CORDARONE) 900 mg in dextrose 5 % 500 mL infusion, 0.5 mg/min, Last Rate: 0.5 mg/min (01/13/24 1800)  heparin (porcine), 3-20 Units/kg/hr (Order-Specific), Last Rate: Stopped (01/14/24 0303)  milrinone (Primacor) infusion, 0.13 mcg/kg/min, Last Rate: 0.13 mcg/kg/min (01/13/24 4286)  norepinephrine, 1-30 mcg/min, Last Rate: 3 mcg/min (01/14/24 6262)  NxStage K 4/Ca 3, 20,000 mL         Labs:    CBC    Recent Labs     01/13/24 2316 01/13/24  2343 01/14/24  0338   WBC 13.65*  --  11.80*   HGB 4.9* 5.3* 7.2*   HCT 15.1*  --  22.0*     --  150     BMP    Recent Labs     01/13/24 2238 01/13/24 2316   SODIUM 135 136   K 4.5 4.4    105   CO2 24 23   AGAP 7 8   BUN 23 23   CREATININE 2.02* 1.92*   CALCIUM 8.6 8.3*       Coags    Recent Labs     01/13/24  1555 01/13/24 2238   PTT 47* 58*        Additional Electrolytes  Recent Labs     01/13/24 2238 01/13/24 2316   MG 2.0 1.9   PHOS 2.7 2.6*   CAIONIZED 1.13 1.12          Blood Gas    No recent results  Recent Labs     01/13/24  2316   PHVEN 7.423*   LQT0VKQ 33.8*   PO2VEN 23.1*   WVN1NWD 21.6*   BEVEN -2.7   C0FCRBW 33.3*    LFTs  Recent Labs     01/12/24  0431 01/13/24  0243   ALT 12 13   AST 18 19   ALKPHOS 57 56   ALB 3.2* 3.2*   TBILI 0.78 0.90       Infectious  No recent results  Glucose  Recent Labs     01/13/24  0901 01/13/24  1555 01/13/24  2238 01/13/24  2316   GLUC 135 219* 199* 201*                 Andre Mendoza PA-C

## 2024-01-14 NOTE — ASSESSMENT & PLAN NOTE
Acute loss anemia present on 1/13 with associated hypotension.  Subsequent large melanotic stool.  Patient does have history of GI bleed and previous hospitalization, required intervention in 2019 at [facility]  Hemoglobin 4.9, Currently receiving 2u pRBC  Plan:  Initiate Protonix 40 twice daily  Continue to monitor frequent hemoglobin  Continue monitor hemodynamics, consider transfusion/hemoglobin recheck with any decline  GI consultation

## 2024-01-14 NOTE — PROGRESS NOTES
"Cardiology Progress Note - Berto Faria 53 y.o. male MRN: [id number]    Unit/Bed#: [facility] 515-01 Encounter: [id number]      Assessment:  Shock - septic/cardiogenic   Paroxysmal atrial fibrillation   Acute HFrEF  Multifocal pneumonia   Cardiomyopathy - ischemic - EF 30%  Acute respiratory failure with hypoxia  Type 2 myocardial infarction secondary to #2/4  ESRD on HD - current on CRRT  Renal transplant failure/rejection   Right AKA secondary to osteomyelitis   Type 1 diabetes mellitus  Hematochezia    Plan:  Cath with in-stent restenosis of left circumflex and LAD stents with  of RCA  Turndown for CABG per CT surgery  Maintaining normal sinus rhythm on IV amiodarone, oral started - continue IV for now given NPO   IV heparin d/c in light of melanotic stool with drop in Hgb  Milrinone discontinued, remains on Levophed - wean as able  Discussed with interventionalist at time of cath who recommended medical management - now in light of #12 no current plans for high risk PCI, can be revisited pending GI evaluation   CRRT per nephrology  For EGD/flexible sigmoidoscopy today       Subjective:   Patient seen and examined.  Overnight events noted.     Objective:     Vitals: Blood pressure 91/54, pulse 83, temperature 98.3 °F (36.8 °C), temperature source Oral, resp. rate (!) 23, height 5' 4\" (1.626 m), weight 48.1 kg (106 lb 0.7 oz), SpO2 100%., Body mass index is 18.2 kg/m².,   Orthostatic Blood Pressures      Flowsheet Row Most Recent Value   Blood Pressure 91/54 filed at 01/14/2024 1215   Patient Position - Orthostatic VS Lying filed at 01/12/2024 1600              Intake/Output Summary (Last 24 hours) at 1/14/2024 1230  Last data filed at 1/14/2024 1200  Gross per 24 hour   Intake 2760.24 ml   Output 2425 ml   Net 335.24 ml           Physical Exam:    GEN: Berto Faria appears well, alert and oriented x 3, pleasant and cooperative   HEENT: pupils equal, round, and reactive to light; extraocular muscles " intact  NECK: supple, no carotid bruits   HEART: regular rhythm, normal S1 and S2, no murmur  LUNGS: Decreased breath sounds bilateral bases  ABDOMEN: normal bowel sounds, soft, no tenderness, no distention  EXTREMITIES: Right AKA, left lower extremity warm with no edema  NEURO: no focal findings   SKIN: normal without suspicious lesions on exposed skin    Medications:      Current Facility-Administered Medications:     acetaminophen (TYLENOL) tablet 650 mg, 650 mg, Oral, Q6H PRN, Rosalva Taylor PA-C, 650 mg at 24 0811    albuterol (PROVENTIL HFA,VENTOLIN HFA) inhaler 2 puff, 2 puff, Inhalation, Q4H PRN, Katie Rose Mariecsek, CRNP    [] amiodarone (CORDARONE) 900 mg in dextrose 5 % 500 mL infusion, 1 mg/min, Intravenous, Continuous, Stopped at 24 1711 **FOLLOWED BY** amiodarone (CORDARONE) 900 mg in dextrose 5 % 500 mL infusion, 0.5 mg/min, Intravenous, Continuous, Katie Grecsek, CRNP, Last Rate: 16.7 mL/hr at 24 1800, 0.5 mg/min at 24 1800    amiodarone tablet 200 mg, 200 mg, Oral, TID With Meals, Katie Grecsek, CRNP, 200 mg at 24 1700    aspirin chewable tablet 81 mg, 81 mg, Oral, QAM, Katie Grecsek, CRNP, 81 mg at 24 0803    atorvastatin (LIPITOR) tablet 80 mg, 80 mg, Oral, Daily With Dinner, Katie Grecsek, CRNP, 80 mg at 24 1700    calcitriol (ROCALTROL) capsule 0.5 mcg, 0.5 mcg, Oral, Once per day on , Katie Grecsek, CRNP, 0.5 mcg at 24 0803    cefepime (MAXIPIME) 1,000 mg in dextrose 5 % 50 mL IVPB, 1,000 mg, Intravenous, Q12H, Katie Grecsek, CRNP, Last Rate: 100 mL/hr at 24 1000, 1,000 mg at 24 1000    cinacalcet (SENSIPAR) tablet 30 mg, 30 mg, Oral, Daily, GAVINO Baker, 30 mg at 24 0803    glycerin-hypromellose- (ARTIFICIAL TEARS) ophthalmic solution 1 drop, 1 drop, Right Eye, Q4H PRN, GAVINO Baker    insulin glargine (LANTUS) subcutaneous injection 10 Units 0.1 mL, 10 Units,  Subcutaneous, Q12H JAMARCUS, Katie Rose Mariecsek, CRNP, 10 Units at 01/14/24 0805    insulin lispro (HumaLOG) 100 units/mL subcutaneous injection 1-6 Units, 1-6 Units, Subcutaneous, HS, Katie Grecsek, CRNP, 2 Units at 01/14/24 0216    insulin lispro (HumaLOG) 100 units/mL subcutaneous injection 1-6 Units, 1-6 Units, Subcutaneous, TID With Meals, 3 Units at 01/14/24 0805 **AND** Fingerstick Glucose (POCT), , , TID AC, Katie Rose Mariecsek, CRNP    insulin lispro (HumaLOG) 100 units/mL subcutaneous injection 8 Units, 8 Units, Subcutaneous, TID With Meals, Katie Rose Mariecsek, CRNP, 5 Units at 01/13/24 1353    norepinephrine (LEVOPHED) 1 mg/mL injection **ADS Override Pull**, , , ,     norepinephrine (LEVOPHED) 4 mg (STANDARD CONCENTRATION) IV in sodium chloride 0.9% 250 mL, 1-30 mcg/min, Intravenous, Titrated, Andre Mendoza PA-C, Last Rate: 7.5 mL/hr at 01/14/24 0900, 2 mcg/min at 01/14/24 0900    NxStage K 4/Ca 3 dialysis solution (RFP-401) 20,000 mL, 20,000 mL, Dialysis, Continuous, Wendy K Doug, DO, 20,000 mL at 01/14/24 0500    ondansetron (ZOFRAN) injection 4 mg, 4 mg, Intravenous, Q8H PRN, Katie Velasco CRNP, 4 mg at 01/14/24 0707    pantoprazole (PROTONIX) injection 40 mg, 40 mg, Intravenous, Q12H JAMARCUS, Andre Mendoza PA-C, 40 mg at 01/14/24 0345    predniSONE tablet 5 mg, 5 mg, Oral, Daily, Katie Trottercsek, CRNP, 5 mg at 01/13/24 0803    tacrolimus (PROGRAF) capsule 1 mg, 1 mg, Oral, QPM, Katie Grecsek, CRNP, 1 mg at 01/13/24 1700    tacrolimus (PROGRAF) capsule 2 mg, 2 mg, Oral, QAM, Katie Velasco, GAVINO, 2 mg at 01/13/24 0804     Labs & Results:        Results from last 7 days   Lab Units 01/14/24  1001 01/14/24  0338 01/13/24  2343 01/13/24  2316 01/13/24  0223   WBC Thousand/uL  --  11.80*  --  13.65* 8.93   HEMOGLOBIN g/dL 7.2* 7.2* 5.3* 4.9* 7.1*   HEMATOCRIT %  --  22.0*  --  15.1* 21.6*   PLATELETS Thousands/uL  --  150  --  187 170         Results from last 7 days   Lab Units 01/14/24  1001 01/14/24  0336  01/13/24  2316 01/13/24  0901 01/13/24  0243 01/12/24  0844 01/12/24  0431   POTASSIUM mmol/L 4.5 4.7 4.4   < >  --    < >  --    CHLORIDE mmol/L 105 105 105   < >  --    < >  --    CO2 mmol/L 22 20* 23   < >  --    < >  --    BUN mg/dL 27* 30* 23   < >  --    < >  --    CREATININE mg/dL 2.14* 2.39* 1.92*   < >  --    < >  --    CALCIUM mg/dL 8.6 8.0* 8.3*   < >  --    < >  --    ALK PHOS U/L  --   --   --   --  56  --  57   ALT U/L  --   --   --   --  13  --  12   AST U/L  --   --   --   --  19  --  18    < > = values in this interval not displayed.     Results from last 7 days   Lab Units 01/13/24 2238 01/13/24  1555 01/13/24  0901 01/13/24  0012 01/12/24  0232 01/11/24 2005 01/11/24  1354   INR   --   --   --   --  1.23*  --  1.17   PTT seconds 58* 47* 46*   < > 74*   < > 45*    < > = values in this interval not displayed.     Results from last 7 days   Lab Units 01/14/24  1001 01/14/24  0336 01/13/24  2316   MAGNESIUM mg/dL 2.1 1.9 1.9     Counseling / Coordination of Care  Total floor / unit time spent today 25 minutes.  Greater than 50% of total time was spent with the patient and / or family counseling and / or coordination of care.

## 2024-01-14 NOTE — PROGRESS NOTES
[facility]  Interval Progress Note: Critical Care  Name: Berto Faria I  MRN: [id number]  Unit/Bed#: [facility]PHP 515-01 I Date of Admission: 1/8/2024   Date of Service: 1/14/2024 I Hospital Day: 6    Interval Events:      Notified by nursing staff at approximately 11:45 PM of acute onset of hypotension requiring increasing Levophed.  Noted to have an episode of bleeding from peripheral IV just prior to this episode of hypotension.  On bedside assessment patient is awake alert and oriented with complaints of only fatigue.  Blood pressure in the 90s systolic supported on 15 of Levophed.  At this time stat labs were sent including a hemoglobin.  Blood was returned from CRRT.  On physical exam patient has a nonfocal neurologic exam, lungs are clear, abdomen is soft, nontender, nondistended, lower extremities are warm.  Stat hemoglobin returned with hemoglobin of 4.9.  2 units of irradiated, CMV negative blood ordered.  Repeat hemoglobin sent prior to transfusion to verify accuracy, 5.1.  Discussed with patient, he denies any acute abdominal pain, bright red blood stools, or melanotic stools-nursing confirms this.  Remainder of endpoints reviewed with slightly elevated lactic however otherwise unremarkable labs.  Point-of-care EKG without acute changes      At approximately 0300, patient had a large melanotic stool.  At this point patient has received 2 units of PRBC and has returned to Levophed of 1-3, which is consistent with his requirement prior to this acute episode of hypotension.  Disability will treat with Protonix twice daily and monitor for further melanotic stools/BRBPR.  Likely a.m. GI consultation.  Hold heparin in the interim and attempt to resume CRRT at this time.           Pertinent New Data:   blood pressure  Arterial Line  [place] /320  Arterial Line BP  Min: 65/43  Max: 320/320    mmHg  Arterial Line MAP (mmHg)  Min: 49 mmHg  Max: 320 mmHg   Physical  Exam  Vitals and nursing note reviewed.   Eyes:      Extraocular Movements: Extraocular movements intact.      Pupils: Pupils are equal, round, and reactive to light.   Skin:     General: Skin is warm.   HENT:      Head: Normocephalic.      Nose: No congestion.      Mouth/Throat:      Mouth: Mucous membranes are moist.   Neck:      Vascular: Central line present.   Cardiovascular:      Rate and Rhythm: Normal rate.   Musculoskeletal:      Left lower leg: No edema.   Abdominal:      Palpations: Abdomen is soft.   Constitutional:       Appearance: He is ill-appearing.   Pulmonary:      Effort: Pulmonary effort is normal.      Breath sounds: Normal breath sounds.   Neurological:      General: No focal deficit present.      Mental Status: He is alert and oriented to person, place and time. Mental status is at baseline.      Motor: gross motor function is at baseline for patient.     '  CBC:   Lab Results   Component Value Date    WBC 13.65 (H) 01/13/2024    HGB 5.3 (LL) 01/13/2024    HCT 15.1 (L) 01/13/2024     (H) 01/13/2024     01/13/2024    RBC 1.44 (L) 01/13/2024    MCH 34.0 01/13/2024    MCHC 32.5 01/13/2024    RDW 17.4 (H) 01/13/2024    MPV 9.3 01/13/2024   , CMP:   Lab Results   Component Value Date    SODIUM 136 01/13/2024    K 4.4 01/13/2024     01/13/2024    CO2 23 01/13/2024    BUN 23 01/13/2024    CREATININE 1.92 (H) 01/13/2024    CALCIUM 8.3 (L) 01/13/2024    EGFR 38 01/13/2024     Lab Results   Component Value Date    PHOS 2.6 (L) 01/13/2024         Assessment and Plan  Diagnosis: Acute hypotension associated with ABLA and melanotic stool  Plan: Stat transfusion of 2 units PRBC (irradiated, CMV negative in the setting of immunosuppression)  Follow-up posttransfusion hemoglobin  Continue to monitor hemodynamics and endpoints  Protonix 40 twice daily.    Billing Level:  During this visit, Critical care services were medically necessary as this patient had a high probability of imminent or  life-threatening deterioration due to shock, GI bleed, and Renal failure, required my direct attention, intervention, and personal management to implement the following: EKG interpretation, directing of titration of vasopressors, fluid management, bedside management, test review, records review, direct patient care, and documentation of findings.    I have personally provided 30 minutes on 1/13/2024 of critical care time, exclusive of procedures, teaching, family meetings, and any prior time recorded by providers other than myself.    SIGNATURE: Andre Mendoza PA-C

## 2024-01-14 NOTE — CONSULTS
[facility] - Inpatient Consultation    PATIENT INFORMATION      Berto Faria 53 y.o. male MRN: [id number]  Unit/Bed#: [facility] 515-01 Encounter: [id number]  PCP: Dalton Anderson DO  Date of Admission:  1/8/2024  Date of Consultation: 01/14/24  Requesting Physician: Nieves Rios MD       ASSESSMENT & PLAN     Berto Faria is a 53 y.o. male with PMH significant for CHF, end stage renal disease status-post two renal transplants (1998, living donor in 2001), Type 1 diabetes mellitus status-post islet cell transplantation, CAD with prior PEA arrest, hx AKA, afib who initially presented to [facility] for evaluation of shortness of breath on 1/7, and ultimately transferred [facility] for higher level of care. He has since been managed for NSTEMi with cardiogenic shock and now on milrinone with cath demonstrating in-stent restenosis of left circumflex and LAD stents with  of RCA, acute hypoxic resp failure in setting of pneumonia and volume overloaded (now off supplemental O2), new onset afib on amio drip, on CVVH for management of ESRD, septic shock in setting of pneumonia on antibiotics. Overnight with acute drop in blood pressure, after being stable off pressors for many days with acute drop in Hgb to 4.9 (5.3 on recheck) with associated bloody bowel movements. GI has been consulted for further evaluation and management.     Hematochezia  Anemia  Hypotension  Patient with acute hypotension requiring up to 15mcg levophed (after being off pressors since AM 1/13, and prior to that only on very low dose for 24hrs previously) overnight 1/13-1/14 with stat labs revealing Hgb 4.9 with recheck 5.3. Also had episode of melena vs. Hematochezia with most recent bowel movement (this AM) being maroon. Pressors restarted as above, but quickly down titrated to 2mcg. Discussed with CC team who believe this is 2/2 blood loss and not due to cardiogenic shock/underlying CAD. They recommend proceeding with  endoscopic evaluation as blood loss believed to be driving patient's current hemodynamic instability. He received 2u PRBC with appropriate response to 7.2, but continues to have bloody bowel movements and hypotension. Heparin drip on held since 3am per chart review.    Prior endoscopic history per chart review:  EGD 12/20/2018 - Dr. Wise EGD noted clot with possible mass in distal third of the esophagus, no blood in stomach, erosive esophagitis and retained pill granules, no active bleeding- no intervention performed - recommended repeat in next 24 hours. Patient transferred prior to repeat  [facility] hospital summary 12/2018 - EGD noting friable esophageal mass with anemia - hgb stabilized and patient discharged home. Became fatigues on way home with nausea and subsequent hematemesis leading to representation. Hgb on presentation was 3 with lactate 6.3. Transferred to [facility]. Repeat labs there with Hgb 5.9, and EGD 12/30/19 showed severe distal esophagitis and proximal gastritis with bleeding treated with epinephrine and hemospray. Repeated EGD 1/11/19 with resolution of bleeding, noting continued esophagitis and dudodenitis  Again with episode of anemia 9/2019 and presented to [facility] with Hgb 2.7 and endorsed coffee ground emesis - transferred to [place]. EGD deferred for transfer for continuity of care. EGD with GI on 9/26, which was notable for nonbleeding pyloric channel ulcer (20 mm in largest dimension), multiple non-bleeding duodenal ulcers with no stigmata of bleeding (not intervened on given that they were not bleeding) as well as Scales's esophagus   EGD/EUS done 4/2022 for evaluation of pancreatic cyst. Negative gastric and duodenal biopsies at that time. FNA of panc cyst benign.     Discussed at bedside with patient that he is very high risk candidate for anesthesia and therefore for endoscopic evaluation - patient wishes to proceed despite risks at this time to evaluate for ongoing bleeding  Will proceed  with EGD and Flexible sigmoidoscopy - timing TBD based on OR availability  Patient made NPO  Would recommend PPI drip until endoscopic evaluation completed  Tap water enemas ordered  No reglan ordered in setting of significant cardiac history/ongoing workup and history of arrhythmia  Monitor and transfuse for Hgb <7.0  Monitor stool ouput  Monitor vital signs closely      REASON FOR CONSULTATION      GIB, Anemia      HISTORY OF PRESENT ILLNESS      Berto Faria is a 53 y.o. male with PMH significant for CHF, end stage renal disease status-post two renal transplants (1998, living donor in 2001), Type 1 diabetes mellitus status-post islet cell transplantation, CAD with prior PEA arrest, hx AKA, afib who initially presented to [facility] for evaluation of shortness of breath on 1/7. On arrival, found to in shock (cardiogenic vs. Septic) and needed CVVH in setting of ESRD and ultimately transferred to [facility] for further management. Has been managed for NSTEMi with cardiogenic shock and now on milrinone with cath demonstrating in-stent restenosis of left circumflex and LAD stents with  of RCA, acute hypoxic resp failure in setting of pneumonia and volume overloaded (now off supplemental O2), new onset afib on amio drip, on CVVH for management of ESRD, sseptic shock in setting of pneumonia on antibiotics. Overnight 1/13, patient developed worsening hypotension with subsequent bloody bowel movements. Conflicting reports of black vs. Red, but most recent with maroon stool in bed pan. Hgb initially down trended from 9's to 7.8 on 1/12, down to 7.1 on 1/13, and repeat with episode of hypotension with hgb 4.9 with recheck 5.3. Now s/p 2u PRBC with improvement to 7.2, however, patient remains hypotensive with need for levophed. GI has been consulted for evaluation of GIB with acute blood loss anemia.    Patient seen and evaluated at bedside. Concerned in regard to bleeding and will to undergo any procedure to evaluate cause.  Reports has had multiple prior endoscopies for bleeding, however, nothing ever intervened upon that he is aware of. No prior colonoscopies. No family history that he is aware of.     REVIEW OF SYSTEMS     CONSTITUTIONAL: Denies any fever, chills, rigors, and weight loss  HEENT: No earache or tinnitus, denies hearing loss or visual disturbances  CARDIOVASCULAR: No chest pain or palpitations   RESPIRATORY: Denies any cough, hemoptysis, shortness of breath or dyspnea on exertion  GASTROINTESTINAL: As noted in the History of Present Illness   GENITOURINARY: anuric  NEUROLOGIC: No dizziness or vertigo, denies headaches   MUSCULOSKELETAL: Denies any muscle or joint pain   SKIN: Denies skin rashes or itching   ENDOCRINE: Denies excessive thirst, denies intolerance to heat or cold  PSYCHOSOCIAL: Denies depression or anxiety, denies any recent memory loss     PAST MEDICAL & SURGICAL HISTORY      Past Medical History:   Diagnosis Date    Bacteremia 12/21/2018    CAD (coronary artery disease)     s/p MARC to LCx, pLAD 2/2018    Cardiac arrest ([facility])     Chronic kidney disease     Diabetes mellitus type 1 (HCC)     Dialysis patient ([facility])     Access in right chest    GI bleed     Hyperlipidemia     Hypertension     Infection at site of external fixator pin (HCC)     MI (myocardial infarction) (HCC)     Myocardial infarction (HCC)     Pneumonia     Last Assessed [id number]    Renal failure     Renal transplant, status post 07/21/2007       Past Surgical History:   Procedure Laterality Date    AMPUTATION Right 02/2019    aboce knee    ANKLE FRACTURE SURGERY      ANKLE HARDWARE REMOVAL Right 7/31/2017    Procedure: REMOVAL HARDWARE ANKLE;  Surgeon: Alvin Leon MD;  Location: [facility];  Service: Orthopedics    ANKLE HARDWARE REMOVAL Right 8/17/2017    Procedure: TIBIA FAILED HARDWARE REMOVAL;  Surgeon: Alvin Leon MD;  Location: [facility];  Service: Orthopedics    CLOSED REDUCTION ANKLE Right 7/3/2017    Procedure: CLOSED  REDUCTION DISTAL TIB-FIB AND CASTING VS;  Surgeon: Alvin Leon MD;  Location: [facility];  Service: Orthopedics    CORONARY ANGIOPLASTY WITH STENT PLACEMENT  02/2018    CAD s/p MARC to LCx, pLAD    ESOPHAGOGASTRODUODENOSCOPY N/A 12/20/2018    Procedure: ESOPHAGOGASTRODUODENOSCOPY (EGD) in ICU;  Surgeon: Galileo Wise IV, MD;  Location: [facility];  Service: Gastroenterology    EYE SURGERY      FRACTURE SURGERY      ORIF Rt Ankle    GLUTAMIC ACID DECARBOXYLASE (HISTORICAL)      IR AV FISTULAGRAM/GRAFTOGRAM  4/16/2020    IR AV FISTULAGRAM/GRAFTOGRAM  6/23/2023    IR PICC LINE  8/20/2018    IR TEMPORARY DIALYSIS CATHETER CHECK/CHANGE/REPOSITION  1/8/2024    IR TUNNELED DIALYSIS CATHETER CHECK/CHANGE/REPOSITION/ANGIOPLASTY  1/8/2020    IR TUNNELED DIALYSIS CATHETER PLACEMENT  10/21/2019    IR TUNNELED DIALYSIS CATHETER REMOVAL  5/29/2020    IR VENOUS LINE REMOVAL  10/5/2018    LEG AMPUTATION Right 02/01/2019    Above the knee    NEPHRECTOMY TRANSPLANTED ORGAN      DC ARTERIOVENOUS ANASTOMOSIS OPEN DIRECT Right 2/11/2020    Procedure: CREATION FISTULA ARTERIOVENOUS (AV) right upper extremity;  Surgeon: Carlos Medina MD;  Location:  MAIN OR;  Service: Vascular    DC OPEN TREATMENT FRACTURE DISTAL TIBIA FIBULA Right 7/3/2017    Procedure: OPEN REDUCTION W/ INTERNAL FIXATION (ORIF);  Surgeon: Alvin Leon MD;  Location: [facility];  Service: Orthopedics    TOE AMPUTATION Right 10/27/2016    Procedure: AMPUTATION TOE;  Surgeon: Krishna Ruiz DPM;  Location: [facility];  Service:        MEDICATIONS & ALLERGIES       Medications:   Medications Prior to Admission   Medication    aspirin 81 mg chewable tablet    atorvastatin (LIPITOR) 80 mg tablet    azaTHIOprine (IMURAN) 50 mg tablet    carvedilol (COREG) 6.25 mg tablet    cholecalciferol (VITAMIN D3) 1,000 units tablet    cinacalcet (SENSIPAR) 30 mg tablet    Glucagon, rDNA, (Glucagon Emergency) 1 MG KIT    glucose blood (Contour Next Test) test strip    insulin aspart  "(NovoLOG) 100 units/mL injection    insulin glargine (Semglee) 100 units/mL subcutaneous injection    Insulin Syringe-Needle U-100 31G X 15/64\" 0.5 ML MISC    isosorbide mononitrate (IMDUR) 30 mg 24 hr tablet    metoprolol succinate (TOPROL-XL) 50 mg 24 hr tablet    multivitamin (THERAGRAN) TABS    NIFEdipine (PROCARDIA XL) 30 mg 24 hr tablet    pantoprazole (PROTONIX) 40 mg tablet    predniSONE 5 mg tablet    tacrolimus (PROGRAF) 1 mg capsule     Current Facility-Administered Medications   Medication Dose Route Frequency    acetaminophen (TYLENOL) tablet 650 mg  650 mg Oral Q6H PRN    albuterol (PROVENTIL HFA,VENTOLIN HFA) inhaler 2 puff  2 puff Inhalation Q4H PRN    amiodarone (CORDARONE) 900 mg in dextrose 5 % 500 mL infusion  0.5 mg/min Intravenous Continuous    amiodarone tablet 200 mg  200 mg Oral TID With Meals    aspirin chewable tablet 81 mg  81 mg Oral QAM    atorvastatin (LIPITOR) tablet 80 mg  80 mg Oral Daily With Dinner    calcitriol (ROCALTROL) capsule 0.5 mcg  0.5 mcg Oral Once per day on Tuesday Thursday Saturday    cefepime (MAXIPIME) 1,000 mg in dextrose 5 % 50 mL IVPB  1,000 mg Intravenous Q12H    cinacalcet (SENSIPAR) tablet 30 mg  30 mg Oral Daily    glycerin-hypromellose- (ARTIFICIAL TEARS) ophthalmic solution 1 drop  1 drop Right Eye Q4H PRN    heparin (porcine) 25,000 units in 0.45% NaCl 250 mL infusion (premix)  3-20 Units/kg/hr (Order-Specific) Intravenous Titrated    heparin (porcine) injection 1,500 Units  1,500 Units Intravenous Q6H PRN    heparin (porcine) injection 3,000 Units  3,000 Units Intravenous Q6H PRN    insulin glargine (LANTUS) subcutaneous injection 10 Units 0.1 mL  10 Units Subcutaneous Q12H JAMARCUS    insulin lispro (HumaLOG) 100 units/mL subcutaneous injection 1-6 Units  1-6 Units Subcutaneous HS    insulin lispro (HumaLOG) 100 units/mL subcutaneous injection 1-6 Units  1-6 Units Subcutaneous TID With Meals    insulin lispro (HumaLOG) 100 units/mL subcutaneous " "injection 8 Units  8 Units Subcutaneous TID With Meals    norepinephrine (LEVOPHED) 1 mg/mL injection **ADS Override Pull**        norepinephrine (LEVOPHED) 4 mg (STANDARD CONCENTRATION) IV in sodium chloride 0.9% 250 mL  1-30 mcg/min Intravenous Titrated    NxStage K 4/Ca 3 dialysis solution (RFP-401) 20,000 mL  20,000 mL Dialysis Continuous    ondansetron (ZOFRAN) injection 4 mg  4 mg Intravenous Q8H PRN    pantoprazole (PROTONIX) injection 40 mg  40 mg Intravenous Q12H JAMARCUS    predniSONE tablet 5 mg  5 mg Oral Daily    tacrolimus (PROGRAF) capsule 1 mg  1 mg Oral QPM    tacrolimus (PROGRAF) capsule 2 mg  2 mg Oral QAM       Allergies:   No Known Allergies    SOCIAL HISTORY      Social History     Marital Status: /Civil Union    Substance Use History:   Social History     Substance and Sexual Activity   Alcohol Use Not Currently    Alcohol/week: 0.0 standard drinks of alcohol     Social History     Tobacco Use   Smoking Status Never   Smokeless Tobacco Never     Social History     Substance and Sexual Activity   Drug Use Never       FAMILY HISTORY      Family History   Problem Relation Age of Onset    Diabetes Brother     Coronary artery disease Mother     No Known Problems Father        PHYSICAL EXAM     Objective   Blood pressure (!) 83/45, pulse 78, temperature 97.5 °F (36.4 °C), temperature source Oral, resp. rate 20, height 5' 4\" (1.626 m), weight 48.1 kg (106 lb 0.7 oz), SpO2 100%. Body mass index is 18.2 kg/m².    Intake/Output Summary (Last 24 hours) at 1/14/2024 0834  Last data filed at 1/14/2024 0800  Gross per 24 hour   Intake 3095.24 ml   Output 2890 ml   Net 205.24 ml       General Appearance:   Alert, cooperative   HEENT:   Normocephalic, atraumatic, anicteric     Neck:   Supple, symmetrical, trachea midline   Lungs:   Equal chest rise, respirations unlabored    Heart:   Regular rate and rhythm   Abdomen:   Soft, non-tender, non-distended; normal bowel sounds; no masses, no organomegaly  "   Rectal:   Deferred    Extremities:   No cyanosis, clubbing or edema, s/p R AKA   Neuro:   Moves all 4 extremities    Skin:   No jaundice, rashes, or lesions      ADDITIONAL DATA     Lab Results:     Results from last 7 days   Lab Units 01/14/24  0338 01/13/24  2316 01/13/24  0223   WBC Thousand/uL 11.80*   < > 8.93   HEMOGLOBIN g/dL 7.2*   < > 7.1*   HEMATOCRIT % 22.0*   < > 21.6*   PLATELETS Thousands/uL 150   < > 170   NEUTROS PCT %  --   --  58   LYMPHS PCT %  --   --  28   MONOS PCT %  --   --  11   EOS PCT %  --   --  2    < > = values in this interval not displayed.     Results from last 7 days   Lab Units 01/14/24  0336 01/13/24  0901 01/13/24  0243   POTASSIUM mmol/L 4.7   < >  --    CHLORIDE mmol/L 105   < >  --    CO2 mmol/L 20*   < >  --    BUN mg/dL 30*   < >  --    CREATININE mg/dL 2.39*   < >  --    CALCIUM mg/dL 8.0*   < >  --    ALK PHOS U/L  --   --  56   ALT U/L  --   --  13   AST U/L  --   --  19    < > = values in this interval not displayed.     Results from last 7 days   Lab Units 01/12/24  0232   INR  1.23*       Imaging:    VAS limited iliac-femoral evaluation for Impella Device    Result Date: 1/13/2024  Narrative:  [facility] REPORT CLINICAL: Indications: Patient presents to evaluate the Ilio-Femoral arteries prior to impella device insertion. Operative History: 2019-02-01 Right AKA 02/11/20 CREATION FISTULA ARTERIOVENOUS (AV) right upper extremity (Right Arm Upper) 02/2018 Coronary angioplasty with stent placement Right Nephrectomy with transplant Risk Factors The patient has history of HTN, Diabetes, Hyperlipidemia, CKD, PAD, A-FIB, CHF, NSTEMI and CAD.  FINDINGS:  Unilateral     PSV (cm/s)  EDV (cm/s)  AP Diam  TRV Diam  Ds Inf-Mark Ao          77           2      1.1       1.2   Right                  PSV (cm/s)  EDV (cm/s)  AP Diam  TRV Diam  Prox DAWN                      105           2      0.6       0.6  Dist DAWN                       75           0      0.7             Prox. EIA                     103           2      0.5            Dist EIA                      102           0      0.4            Common Femoral Artery          81           8      0.6            Prox SFA                       57           1      0.4             Left                   PSV (cm/s)  EDV (cm/s)  AP Diam  Prox DAWN                      112           0      0.9  Dist DAWN                       53           0      0.6  Prox. EIA                      93           0      0.5  Dist EIA                      114           2      0.7  Common Femoral Artery         117           0      0.6  Prox SFA                       97           0      0.4     CONCLUSION:  Impression  DISTAL AORTA: The distal abdominal aorta is patent and normal in caliber with the diameter measurement of 1.1 cm.  ILIO-FEMORAL: The right ilio-femoral arteries measurements ranged from 0.4 cm to 0.7 cm. The left ilio-femoral arteries measurements ranged from 0.4 cm to 0.9 cm. There is diffuse disease noted throughout the external iliac, common femoral and superficial femoral arteries bilaterally.  Incidental Finding: There is a hypoechoic non-vascularized structure in the left groin anterior to the iliac vessels measuring approximately 3.3 x 2.2 x 3.1 cm.  SIGNATURE: Electronically Signed by: CARRIE MATT DO on 2024-01-13 09:51:20 PM    Cardiac catheterization    Result Date: 1/12/2024  Narrative:   Ost LAD lesion is 95% stenosed.   Ost Cx to Prox Cx lesion is 75% stenosed.   Prox RCA lesion is 100% stenosed.   Mid LAD lesion is 60% stenosed.   Dist LM lesion is 50% stenosed.   1st Mrg lesion is 100% stenosed. Severe in-stent restenosis bifurcation ostial LAD/LCX Occluded RCA () Moderately elevated LVEDP Porcelain aorta     Echo follow up/limited w/ contrast if indicated    Result Date: 1/11/2024  Narrative:   Left Ventricle: Left ventricular cavity size is mildly dilated. Wall thickness is normal. There is eccentric hypertrophy. The left  ventricular ejection fraction is 32%. Systolic function is severely reduced. There is severe global hypokinesis with specific regional wall abnormalities in the anteroseptal, inferior and apical regions. Diastolic function is abnormal.   Left Atrium: The atrium is moderately dilated.   Mitral Valve: There is mild to moderate regurgitation.   Tricuspid Valve: There is mild to moderate regurgitation. The right ventricular systolic pressure is moderately elevated. The estimated right ventricular systolic pressure is 57.00 mmHg.     IR temporary dialysis catheter check/change/reposition    Result Date: 1/9/2024  Narrative: PROCEDURE: Fluoroscopic guided nontunneled dialysis catheter repositioning. STAFF: Tony Francisco M.D. CONTRAST: 10 mL Omnipaque intravenous. FLUOROSCOPY TIME: 2.1 minutes. NUMBER OF IMAGES: Multiple COMPLICATIONS: None. MEDICATIONS: 1% lidocaine subcutaneous. INDICATION: Malpositioned right internal jugular nontunneled dialysis catheter. PROCEDURE: Venography was performed to confirm intraluminal location. Over the wire, the existing catheter was retracted. The wire was repositioned into the inferior vena cava. Over the wire, a new 20 cm 12.5 Japanese trialysis catheter was extended, with the tip in the right atrium. FINDINGS: 1.  Fluoroscopy showed the existing catheter to terminate in the right subclavian vein. This was confirmed with venography. 2.  Final fluoroscopic image showed good positioning of the new catheter in the right atrium.     Impression: Nontunneled dialysis catheter repositioning. Workstation performed: [id number]     XR chest portable ICU    Result Date: 1/8/2024  Narrative: CHEST INDICATION:   Line placement. COMPARISON: 2024 at 1121 hours EXAM PERFORMED/VIEWS:  XR CHEST PORTABLE ICU FINDINGS: Right IJ line unchanged presentation superimposed over right upper lung, presumably within right subclavian vein. Mild cardiomegaly with slight increased mild vascular congestion.  Persistent bibasilar airspace opacities right greater with increased density. Small right pleural effusion no pneumothorax.. The lungs are clear.  No pneumothorax or pleural effusion. Osseous structures appear within normal limits for patient age.     Impression: No change right IJ line presumed within right subclavian vein. Mild cardiomegaly. Mild vascular congestion. Persistent bibasilar infiltrates under atelectasis, worsening on the right. Small right effusion. The study was marked in EPIC for immediate notification. Workstation performed: [id number]     US bedside procedure    Result Date: 1/8/2024  Narrative: [id number]    XR chest portable ICU    Result Date: 1/8/2024  Narrative: CHEST INDICATION:   line placement. COMPARISON: Chest x-ray 1/7/2024. CT CHEST: 1. 7/20/2024 EXAM PERFORMED/VIEWS:  XR CHEST PORTABLE ICU FINDINGS: Right internal jugular CVC is seen with its distal portions coursing laterally and projecting over the right upper lung, favored to be within the right subclavian vein. Cardiomediastinal silhouette appears unremarkable. Bibasilar airspace opacities are again demonstrated, right worse on left. The opacities are improved from radiograph of 1/7/2024. Interstitial markings are prominent. No appreciable pneumothorax. No evidence of acute osseous abnormality.     Impression: 1. Malpositioned right internal jugular central venous catheter with its distal portions projecting within the right subclavian vein. 2. Interval improvement of bibasilar airspace opacities as compared to radiograph of 1/7/2024. Opacities are again worse on the right. I personally discussed findings regarding central line placement with JANET MUSCO on 1/8/2024 11:30 AM. Workstation performed: [id number]     Echo follow up/limited w/ contrast if indicated    Result Date: 1/8/2024  Narrative:   Left Ventricle: Left ventricular cavity size is normal. Wall thickness is mildly  increased. The left ventricular ejection fraction is 40%. Systolic function is moderately reduced. Left atrial filling pressure is elevated.   The following segments are hypokinetic: mid anteroseptal, mid inferior, apical anterior, apical septal, apical inferior and apex.   All other segments are normal.   Right Ventricle: Systolic function is normal.   Mitral Valve: There is mild regurgitation.   Tricuspid Valve: There is mild regurgitation. Limited study.      XR chest 1 view portable    Result Date: 1/7/2024  Narrative: CHEST INDICATION:   hypoxia. COMPARISON: Chest radiograph April 27, 2023 EXAM PERFORMED/VIEWS:  XR CHEST PORTABLE FINDINGS: Cardiomediastinal silhouette appears unremarkable. New bibasilar consolidations, right larger than left. Increased prominence of the interstitial markings. No definite pleural effusion. No pneumothorax. Osseous structures appear within normal limits for patient age.     Impression: Pneumonia and pulmonary edema. Workstation performed: [id number]     CT chest without contrast    Result Date: 1/7/2024  Narrative: CT CHEST WITHOUT IV CONTRAST INDICATION:   Pneumonia, effusion or abscess suspected, xray done pna. COMPARISON: CT chest 10/11/2022, CT abdomen and pelvis 10/1/2022 and MRI abdomen 1/20/2022. TECHNIQUE: CT examination of the chest was performed without intravenous contrast. Multiplanar 2D reformatted images were created from the source data. This examination, like all CT scans performed in the [facility], was performed utilizing techniques to minimize radiation dose exposure, including the use of iterative reconstruction and automated exposure control. Radiation dose length product (DLP) for this visit:  193.45 mGy-cm FINDINGS: LUNGS: Bilateral multi lobar consolidation more prominent in the lower lobes. Bilateral multi lobar groundglass opacities, right greater than left. Minimal bibasilar smooth septal thickening. Trace mucus in the trachea. PLEURA:  Small bilateral pleural effusions. HEART/GREAT VESSELS: Heart is enlarged.  No pericardial effusion.  Aortic and coronary artery calcification. No thoracic aortic aneurysm. MEDIASTINUM AND DONNY:  Unremarkable. CHEST WALL AND LOWER NECK: Minimal bilateral gynecomastia. VISUALIZED STRUCTURES IN THE UPPER ABDOMEN: Markedly atrophic kidneys with bilateral cysts. Small cysts in the body of the pancreas, the larger of which measures 1.6 cm. No significant interval change since January 2022 allowing for difference in imaging  technique. OSSEOUS STRUCTURES: No acute fracture or osseous destructive lesion identified. Mild degenerative changes of the spine. Mild diffuse renal osteodystrophy.     Impression: Bilateral multi lobar airspace consolidation, groundglass opacities, smooth septal thickening and small bilateral pleural effusions attributed to pulmonary vascular congestion. Underlying superimposed pneumonia is not excluded. Correlate with clinical findings. Small cysts in the partially visualized pancreas measuring up to 1.6 cm grossly unchanged since January 2022. Advise nonemergent outpatient abdomen MRI/MRCP follow-up to assess 2-year stability. Additional chronic findings and negatives as above. This study demonstrates a significant  finding and was documented as such in [facility] for liaison and referring practitioner notification. This study demonstrates a finding requiring imaging follow-up and was documented as such in Epic. Workstation performed: [id number]       EKG, Pathology, and Other Studies Reviewed on Admission:   EKG: Reviewed      Counseling / Coordination of Care Time: 30 total mins spent in consult. Greater than 50% of total time spent on patient counseling and coordination of care.    Maryann Brink DO  Gastroenterology Fellow  [facility]  Division of Gastroenterology and Hepatology  Available on  Tiger[employer]  ...............................................................................................................................................  ** Please Note: This note is constructed using a voice recognition dictation system. **

## 2024-01-15 ENCOUNTER — APPOINTMENT (OUTPATIENT)
Dept: PHYSICAL THERAPY | Facility: CLINIC | Age: 54
End: 2024-01-15
Payer: MEDICARE

## 2024-01-15 ENCOUNTER — TELEPHONE (OUTPATIENT)
Dept: GASTROENTEROLOGY | Facility: CLINIC | Age: 54
End: 2024-01-15

## 2024-01-15 LAB
ABO GROUP BLD BPU: NORMAL
ANION GAP SERPL CALCULATED.3IONS-SCNC: 6 MMOL/L
ANION GAP SERPL CALCULATED.3IONS-SCNC: 9 MMOL/L
BASOPHILS # BLD AUTO: 0.03 THOUSANDS/ÂΜL (ref 0–0.1)
BASOPHILS NFR BLD AUTO: 0 % (ref 0–1)
BPU ID: NORMAL
BUN SERPL-MCNC: 18 MG/DL (ref 5–25)
BUN SERPL-MCNC: 19 MG/DL (ref 5–25)
CA-I BLD-SCNC: 1.15 MMOL/L (ref 1.12–1.32)
CA-I BLD-SCNC: 1.16 MMOL/L (ref 1.12–1.32)
CA-I BLD-SCNC: 1.17 MMOL/L (ref 1.12–1.32)
CA-I BLD-SCNC: 1.17 MMOL/L (ref 1.12–1.32)
CALCIUM SERPL-MCNC: 8.3 MG/DL (ref 8.4–10.2)
CALCIUM SERPL-MCNC: 8.4 MG/DL (ref 8.4–10.2)
CALCIUM SERPL-MCNC: 8.5 MG/DL (ref 8.4–10.2)
CALCIUM SERPL-MCNC: 8.7 MG/DL (ref 8.4–10.2)
CHLORIDE SERPL-SCNC: 102 MMOL/L (ref 96–108)
CHLORIDE SERPL-SCNC: 102 MMOL/L (ref 96–108)
CHLORIDE SERPL-SCNC: 104 MMOL/L (ref 96–108)
CHLORIDE SERPL-SCNC: 105 MMOL/L (ref 96–108)
CO2 SERPL-SCNC: 22 MMOL/L (ref 21–32)
CO2 SERPL-SCNC: 23 MMOL/L (ref 21–32)
CO2 SERPL-SCNC: 23 MMOL/L (ref 21–32)
CO2 SERPL-SCNC: 25 MMOL/L (ref 21–32)
CREAT SERPL-MCNC: 1.89 MG/DL (ref 0.6–1.3)
CREAT SERPL-MCNC: 1.91 MG/DL (ref 0.6–1.3)
CREAT SERPL-MCNC: 1.99 MG/DL (ref 0.6–1.3)
CREAT SERPL-MCNC: 2.05 MG/DL (ref 0.6–1.3)
CROSSMATCH: NORMAL
EOSINOPHIL # BLD AUTO: 0.13 THOUSAND/ÂΜL (ref 0–0.61)
EOSINOPHIL NFR BLD AUTO: 1 % (ref 0–6)
ERYTHROCYTE [DISTWIDTH] IN BLOOD BY AUTOMATED COUNT: 19.3 % (ref 11.6–15.1)
ERYTHROCYTE [DISTWIDTH] IN BLOOD BY AUTOMATED COUNT: 19.3 % (ref 11.6–15.1)
GFR SERPL CREATININE-BSD FRML MDRD: 35 ML/MIN/1.73SQ M
GFR SERPL CREATININE-BSD FRML MDRD: 36 ML/MIN/1.73SQ M
GFR SERPL CREATININE-BSD FRML MDRD: 38 ML/MIN/1.73SQ M
GFR SERPL CREATININE-BSD FRML MDRD: 39 ML/MIN/1.73SQ M
GLUCOSE SERPL-MCNC: 109 MG/DL (ref 65–140)
GLUCOSE SERPL-MCNC: 122 MG/DL (ref 65–140)
GLUCOSE SERPL-MCNC: 148 MG/DL (ref 65–140)
GLUCOSE SERPL-MCNC: 170 MG/DL (ref 65–140)
GLUCOSE SERPL-MCNC: 214 MG/DL (ref 65–140)
GLUCOSE SERPL-MCNC: 233 MG/DL (ref 65–140)
GLUCOSE SERPL-MCNC: 93 MG/DL (ref 65–140)
GLUCOSE SERPL-MCNC: 99 MG/DL (ref 65–140)
HCT VFR BLD AUTO: 23.1 % (ref 36.5–49.3)
HCT VFR BLD AUTO: 23.2 % (ref 36.5–49.3)
HGB BLD-MCNC: 7.9 G/DL (ref 12–17)
HGB BLD-MCNC: 8.1 G/DL (ref 12–17)
HGB BLD-MCNC: 8.1 G/DL (ref 12–17)
HGB BLD-MCNC: 8.2 G/DL (ref 12–17)
HGB BLD-MCNC: 8.3 G/DL (ref 12–17)
IMM GRANULOCYTES # BLD AUTO: 0.16 THOUSAND/UL (ref 0–0.2)
IMM GRANULOCYTES NFR BLD AUTO: 1 % (ref 0–2)
LYMPHOCYTES # BLD AUTO: 2.01 THOUSANDS/ÂΜL (ref 0.6–4.47)
LYMPHOCYTES NFR BLD AUTO: 12 % (ref 14–44)
MAGNESIUM SERPL-MCNC: 1.9 MG/DL (ref 1.9–2.7)
MAGNESIUM SERPL-MCNC: 2 MG/DL (ref 1.9–2.7)
MAGNESIUM SERPL-MCNC: 2.2 MG/DL (ref 1.9–2.7)
MAGNESIUM SERPL-MCNC: 2.5 MG/DL (ref 1.9–2.7)
MCH RBC QN AUTO: 32 PG (ref 26.8–34.3)
MCH RBC QN AUTO: 32.9 PG (ref 26.8–34.3)
MCHC RBC AUTO-ENTMCNC: 34.1 G/DL (ref 31.4–37.4)
MCHC RBC AUTO-ENTMCNC: 35.1 G/DL (ref 31.4–37.4)
MCV RBC AUTO: 94 FL (ref 82–98)
MCV RBC AUTO: 94 FL (ref 82–98)
MONOCYTES # BLD AUTO: 1.44 THOUSAND/ÂΜL (ref 0.17–1.22)
MONOCYTES NFR BLD AUTO: 9 % (ref 4–12)
NEUTROPHILS # BLD AUTO: 13.02 THOUSANDS/ÂΜL (ref 1.85–7.62)
NEUTS SEG NFR BLD AUTO: 77 % (ref 43–75)
NRBC BLD AUTO-RTO: 1 /100 WBCS
PHOSPHATE SERPL-MCNC: 2.2 MG/DL (ref 2.7–4.5)
PHOSPHATE SERPL-MCNC: 2.6 MG/DL (ref 2.7–4.5)
PHOSPHATE SERPL-MCNC: 2.9 MG/DL (ref 2.7–4.5)
PHOSPHATE SERPL-MCNC: 3 MG/DL (ref 2.7–4.5)
PLATELET # BLD AUTO: 131 THOUSANDS/UL (ref 149–390)
PMV BLD AUTO: 10.6 FL (ref 8.9–12.7)
PMV BLD AUTO: 10.8 FL (ref 8.9–12.7)
POTASSIUM SERPL-SCNC: 4.6 MMOL/L (ref 3.5–5.3)
POTASSIUM SERPL-SCNC: 4.7 MMOL/L (ref 3.5–5.3)
POTASSIUM SERPL-SCNC: 4.9 MMOL/L (ref 3.5–5.3)
POTASSIUM SERPL-SCNC: 5 MMOL/L (ref 3.5–5.3)
RBC # BLD AUTO: 2.46 MILLION/UL (ref 3.88–5.62)
RBC # BLD AUTO: 2.47 MILLION/UL (ref 3.88–5.62)
SODIUM SERPL-SCNC: 131 MMOL/L (ref 135–147)
SODIUM SERPL-SCNC: 133 MMOL/L (ref 135–147)
SODIUM SERPL-SCNC: 134 MMOL/L (ref 135–147)
SODIUM SERPL-SCNC: 135 MMOL/L (ref 135–147)
UNIT DISPENSE STATUS: NORMAL
UNIT PRODUCT CODE: NORMAL
UNIT PRODUCT VOLUME: 350 ML
UNIT RH: NORMAL
WBC # BLD AUTO: 16.79 THOUSAND/UL (ref 4.31–10.16)
WBC # BLD AUTO: 17.31 THOUSAND/UL (ref 4.31–10.16)

## 2024-01-15 PROCEDURE — 82330 ASSAY OF CALCIUM: CPT | Performed by: PHYSICIAN ASSISTANT

## 2024-01-15 PROCEDURE — 82948 REAGENT STRIP/BLOOD GLUCOSE: CPT

## 2024-01-15 PROCEDURE — 5A1D90Z PERFORMANCE OF URINARY FILTRATION, CONTINUOUS, GREATER THAN 18 HOURS PER DAY: ICD-10-PCS | Performed by: INTERNAL MEDICINE

## 2024-01-15 PROCEDURE — 83735 ASSAY OF MAGNESIUM: CPT | Performed by: PHYSICIAN ASSISTANT

## 2024-01-15 PROCEDURE — 99232 SBSQ HOSP IP/OBS MODERATE 35: CPT | Performed by: INTERNAL MEDICINE

## 2024-01-15 PROCEDURE — 85027 COMPLETE CBC AUTOMATED: CPT | Performed by: PHYSICIAN ASSISTANT

## 2024-01-15 PROCEDURE — C9113 INJ PANTOPRAZOLE SODIUM, VIA: HCPCS | Performed by: PHYSICIAN ASSISTANT

## 2024-01-15 PROCEDURE — 85018 HEMOGLOBIN: CPT | Performed by: PHYSICIAN ASSISTANT

## 2024-01-15 PROCEDURE — 90945 DIALYSIS ONE EVALUATION: CPT

## 2024-01-15 PROCEDURE — 99291 CRITICAL CARE FIRST HOUR: CPT | Performed by: ANESTHESIOLOGY

## 2024-01-15 PROCEDURE — 80048 BASIC METABOLIC PNL TOTAL CA: CPT | Performed by: PHYSICIAN ASSISTANT

## 2024-01-15 PROCEDURE — 84100 ASSAY OF PHOSPHORUS: CPT | Performed by: PHYSICIAN ASSISTANT

## 2024-01-15 PROCEDURE — 90945 DIALYSIS ONE EVALUATION: CPT | Performed by: INTERNAL MEDICINE

## 2024-01-15 PROCEDURE — 85025 COMPLETE CBC W/AUTO DIFF WBC: CPT | Performed by: PHYSICIAN ASSISTANT

## 2024-01-15 RX ORDER — CALCIUM GLUCONATE 20 MG/ML
1 INJECTION, SOLUTION INTRAVENOUS ONCE
Status: COMPLETED | OUTPATIENT
Start: 2024-01-15 | End: 2024-01-15

## 2024-01-15 RX ORDER — CLOPIDOGREL BISULFATE 75 MG/1
75 TABLET ORAL DAILY
Status: DISCONTINUED | OUTPATIENT
Start: 2024-01-15 | End: 2024-01-20 | Stop reason: HOSPADM

## 2024-01-15 RX ORDER — CALCIUM GLUCONATE 20 MG/ML
1 INJECTION, SOLUTION INTRAVENOUS ONCE
Status: COMPLETED | OUTPATIENT
Start: 2024-01-15 | End: 2024-01-16

## 2024-01-15 RX ORDER — MAGNESIUM SULFATE 1 G/100ML
1 INJECTION INTRAVENOUS ONCE
Status: COMPLETED | OUTPATIENT
Start: 2024-01-15 | End: 2024-01-15

## 2024-01-15 RX ADMIN — CALCIUM GLUCONATE 1 G: 20 INJECTION, SOLUTION INTRAVENOUS at 22:35

## 2024-01-15 RX ADMIN — Medication 20000 ML: at 15:33

## 2024-01-15 RX ADMIN — AMIODARONE HYDROCHLORIDE 200 MG: 200 TABLET ORAL at 17:01

## 2024-01-15 RX ADMIN — ACETAMINOPHEN 650 MG: 325 TABLET, FILM COATED ORAL at 14:03

## 2024-01-15 RX ADMIN — PANTOPRAZOLE SODIUM 40 MG: 40 INJECTION, POWDER, FOR SOLUTION INTRAVENOUS at 17:01

## 2024-01-15 RX ADMIN — AMIODARONE HYDROCHLORIDE 200 MG: 200 TABLET ORAL at 07:29

## 2024-01-15 RX ADMIN — ATORVASTATIN CALCIUM 80 MG: 80 TABLET, FILM COATED ORAL at 17:02

## 2024-01-15 RX ADMIN — PANTOPRAZOLE SODIUM 40 MG: 40 INJECTION, POWDER, FOR SOLUTION INTRAVENOUS at 05:04

## 2024-01-15 RX ADMIN — SODIUM PHOSPHATE, MONOBASIC, MONOHYDRATE AND SODIUM PHOSPHATE, DIBASIC, ANHYDROUS 6 MMOL: 142; 276 INJECTION, SOLUTION INTRAVENOUS at 11:52

## 2024-01-15 RX ADMIN — CALCIUM GLUCONATE 1 G: 20 INJECTION, SOLUTION INTRAVENOUS at 07:18

## 2024-01-15 RX ADMIN — CALCIUM GLUCONATE 1 G: 20 INJECTION, SOLUTION INTRAVENOUS at 10:24

## 2024-01-15 RX ADMIN — CEFEPIME 1000 MG: 1 INJECTION, POWDER, FOR SOLUTION INTRAMUSCULAR; INTRAVENOUS at 22:01

## 2024-01-15 RX ADMIN — CALCIUM GLUCONATE 1 G: 20 INJECTION, SOLUTION INTRAVENOUS at 16:55

## 2024-01-15 RX ADMIN — AMIODARONE HYDROCHLORIDE 200 MG: 200 TABLET ORAL at 11:26

## 2024-01-15 RX ADMIN — ASPIRIN 81 MG CHEWABLE TABLET 81 MG: 81 TABLET CHEWABLE at 08:09

## 2024-01-15 RX ADMIN — Medication 20000 ML: at 05:37

## 2024-01-15 RX ADMIN — INSULIN LISPRO 1 UNITS: 100 INJECTION, SOLUTION INTRAVENOUS; SUBCUTANEOUS at 17:28

## 2024-01-15 RX ADMIN — CLOPIDOGREL BISULFATE 75 MG: 75 TABLET ORAL at 17:01

## 2024-01-15 RX ADMIN — TACROLIMUS 1 MG: 1 CAPSULE ORAL at 17:02

## 2024-01-15 RX ADMIN — CEFEPIME 1000 MG: 1 INJECTION, POWDER, FOR SOLUTION INTRAMUSCULAR; INTRAVENOUS at 10:24

## 2024-01-15 RX ADMIN — MAGNESIUM SULFATE HEPTAHYDRATE 1 G: 1 INJECTION, SOLUTION INTRAVENOUS at 14:38

## 2024-01-15 RX ADMIN — INSULIN LISPRO 1 UNITS: 100 INJECTION, SOLUTION INTRAVENOUS; SUBCUTANEOUS at 07:27

## 2024-01-15 RX ADMIN — ACETAMINOPHEN 650 MG: 325 TABLET, FILM COATED ORAL at 05:04

## 2024-01-15 RX ADMIN — TACROLIMUS 2 MG: 1 CAPSULE ORAL at 08:09

## 2024-01-15 RX ADMIN — CINACALCET 30 MG: 30 TABLET, FILM COATED ORAL at 08:09

## 2024-01-15 RX ADMIN — INSULIN GLARGINE 10 UNITS: 100 INJECTION, SOLUTION SUBCUTANEOUS at 08:09

## 2024-01-15 RX ADMIN — PREDNISONE 5 MG: 5 TABLET ORAL at 08:09

## 2024-01-15 NOTE — PLAN OF CARE
Problem: Prexisting or High Potential for Compromised Skin Integrity  Goal: Skin integrity is maintained or improved  Description: INTERVENTIONS:  - Identify patients at risk for skin breakdown  - Assess and monitor skin integrity  - Assess and monitor nutrition and hydration status  - Monitor labs   - Assess for incontinence   - Turn and reposition patient  - Assist with mobility/ambulation  - Relieve pressure over bony prominences  - Avoid friction and shearing  - Provide appropriate hygiene as needed including keeping skin clean and dry  - Evaluate need for skin moisturizer/barrier cream  - Collaborate with interdisciplinary team   - Patient/family teaching  - Consider wound care consult   Outcome: Progressing     Problem: PAIN - ADULT  Goal: Verbalizes/displays adequate comfort level or baseline comfort level  Description: Interventions:  - Encourage patient to monitor pain and request assistance  - Assess pain using appropriate pain scale  - Administer analgesics based on type and severity of pain and evaluate response  - Implement non-pharmacological measures as appropriate and evaluate response  - Consider cultural and social influences on pain and pain management  - Notify physician/advanced practitioner if interventions unsuccessful or patient reports new pain  Outcome: Progressing     Problem: INFECTION - ADULT  Goal: Absence or prevention of progression during hospitalization  Description: INTERVENTIONS:  - Assess and monitor for signs and symptoms of infection  - Monitor lab/diagnostic results  - Monitor all insertion sites, i.e. indwelling lines, tubes, and drains  - Monitor endotracheal if appropriate and nasal secretions for changes in amount and color  - [place] appropriate cooling/warming therapies per order  - Administer medications as ordered  - Instruct and encourage patient and family to use good hand hygiene technique  - Identify and instruct in appropriate isolation precautions for  identified infection/condition  Outcome: Progressing  Goal: Absence of fever/infection during neutropenic period  Description: INTERVENTIONS:  - Monitor WBC    Outcome: Progressing     Problem: SAFETY ADULT  Goal: Patient will remain free of falls  Description: INTERVENTIONS:  - Educate patient/family on patient safety including physical limitations  - Instruct patient to call for assistance with activity   - Consult OT/PT to assist with strengthening/mobility   - Keep Call bell within reach  - Keep bed low and locked with side rails adjusted as appropriate  - Keep care items and personal belongings within reach  - Initiate and maintain comfort rounds  - Make Fall Risk Sign visible to staff  - Offer Toileting every 2 Hours, in advance of need  - Initiate/Maintain bed alarm  - Obtain necessary fall risk management equipment: non-slip socks  - Apply yellow socks and bracelet for high fall risk patients  - Consider moving patient to room near nurses station  Outcome: Progressing  Goal: Maintain or return to baseline ADL function  Description: INTERVENTIONS:  -  Assess patient's ability to carry out ADLs; assess patient's baseline for ADL function and identify physical deficits which impact ability to perform ADLs (bathing, care of mouth/teeth, toileting, grooming, dressing, etc.)  - Assess/evaluate cause of self-care deficits   - Assess range of motion  - Assess patient's mobility; develop plan if impaired  - Assess patient's need for assistive devices and provide as appropriate  - Encourage maximum independence but intervene and supervise when necessary  - Involve family in performance of ADLs  - Assess for home care needs following discharge   - Consider OT consult to assist with ADL evaluation and planning for discharge  - Provide patient education as appropriate  Outcome: Progressing  Goal: Maintains/Returns to pre admission functional level  Description: INTERVENTIONS:  - Perform AM-PAC 6 Click Basic Mobility/  Daily Activity assessment daily.  - Set and communicate daily mobility goal to care team and patient/family/caregiver.   - Collaborate with rehabilitation services on mobility goals if consulted  - Perform Range of Motion 4 times a day.  - Reposition patient every 2 hours.  - Dangle patient 3 times a day  - Stand patient 3 times a day  - Ambulate patient 3 times a day  - Out of bed to chair 3 times a day   - Out of bed for meals 3 times a day  - Out of bed for toileting  - Record patient progress and toleration of activity level   Outcome: Progressing     Problem: DISCHARGE PLANNING  Goal: Discharge to home or other facility with appropriate resources  Description: INTERVENTIONS:  - Identify barriers to discharge w/patient and caregiver  - Arrange for needed discharge resources and transportation as appropriate  - Identify discharge learning needs (meds, wound care, etc.)  - Arrange for interpretive services to assist at discharge as needed  - Refer to Case Management Department for coordinating discharge planning if the patient needs post-hospital services based on physician/advanced practitioner order or complex needs related to functional status, cognitive ability, or social support system  Outcome: Progressing     Problem: Knowledge Deficit  Goal: Patient/family/caregiver demonstrates understanding of disease process, treatment plan, medications, and discharge instructions  Description: Complete learning assessment and assess knowledge base.  Interventions:  - Provide teaching at level of understanding  - Provide teaching via preferred learning methods  Outcome: Progressing     Problem: NEUROSENSORY - ADULT  Goal: Achieves stable or improved neurological status  Description: INTERVENTIONS  - Monitor and report changes in neurological status  - Monitor vital signs such as temperature, blood pressure, glucose, and any other labs ordered   - Initiate measures to prevent increased intracranial pressure  - Monitor  for seizure activity and implement precautions if appropriate      Outcome: Progressing  Goal: Remains free of injury related to seizures activity  Description: INTERVENTIONS  - Maintain airway, patient safety  and administer oxygen as ordered  - Monitor patient for seizure activity, document and report duration and description of seizure to physician/advanced practitioner  - If seizure occurs,  ensure patient safety during seizure  - Reorient patient post seizure  - Seizure pads on all 4 side rails  - Instruct patient/family to notify RN of any seizure activity including if an aura is experienced  - Instruct patient/family to call for assistance with activity based on nursing assessment  - Administer anti-seizure medications if ordered    Outcome: Progressing  Goal: Achieves maximal functionality and self care  Description: INTERVENTIONS  - Monitor swallowing and airway patency with patient fatigue and changes in neurological status  - Encourage and assist patient to increase activity and self care.   - Encourage visually impaired, hearing impaired and aphasic patients to use assistive/communication devices  Outcome: Progressing     Problem: CARDIOVASCULAR - ADULT  Goal: Maintains optimal cardiac output and hemodynamic stability  Description: INTERVENTIONS:  - Monitor I/O, vital signs and rhythm  - Monitor for S/S and trends of decreased cardiac output  - Administer and titrate ordered vasoactive medications to optimize hemodynamic stability  - Assess quality of pulses, skin color and temperature  - Assess for signs of decreased coronary artery perfusion  - Instruct patient to report change in severity of symptoms  Outcome: Progressing  Goal: Absence of cardiac dysrhythmias or at baseline rhythm  Description: INTERVENTIONS:  - Continuous cardiac monitoring, vital signs, obtain 12 lead EKG if ordered  - Administer antiarrhythmic and heart rate control medications as ordered  - Monitor electrolytes and administer  replacement therapy as ordered  Outcome: Progressing     Problem: RESPIRATORY - ADULT  Goal: Achieves optimal ventilation and oxygenation  Description: INTERVENTIONS:  - Assess for changes in respiratory status  - Assess for changes in mentation and behavior  - Position to facilitate oxygenation and minimize respiratory effort  - Oxygen administered by appropriate delivery if ordered  - Initiate smoking cessation education as indicated  - Encourage broncho-pulmonary hygiene including cough, deep breathe, Incentive Spirometry  - Assess the need for suctioning and aspirate as needed  - Assess and instruct to report SOB or any respiratory difficulty  - Respiratory Therapy support as indicated  Outcome: Progressing     Problem: GASTROINTESTINAL - ADULT  Goal: Minimal or absence of nausea and/or vomiting  Description: INTERVENTIONS:  - Administer IV fluids if ordered to ensure adequate hydration  - Maintain NPO status until nausea and vomiting are resolved  - Nasogastric tube if ordered  - Administer ordered antiemetic medications as needed  - Provide nonpharmacologic comfort measures as appropriate  - Advance diet as tolerated, if ordered  - Consider nutrition services referral to assist patient with adequate nutrition and appropriate food choices  Outcome: Progressing  Goal: Maintains or returns to baseline bowel function  Description: INTERVENTIONS:  - Assess bowel function  - Encourage oral fluids to ensure adequate hydration  - Administer IV fluids if ordered to ensure adequate hydration  - Administer ordered medications as needed  - Encourage mobilization and activity  - Consider nutritional services referral to assist patient with adequate nutrition and appropriate food choices  Outcome: Progressing  Goal: Maintains adequate nutritional intake  Description: INTERVENTIONS:  - Monitor percentage of each meal consumed  - Identify factors contributing to decreased intake, treat as appropriate  - Assist with meals as  needed  - Monitor I&O, weight, and lab values if indicated  - Obtain nutrition services referral as needed  Outcome: Progressing  Goal: Establish and maintain optimal ostomy function  Description: INTERVENTIONS:  - Assess bowel function  - Encourage oral fluids to ensure adequate hydration  - Administer IV fluids if ordered to ensure adequate hydration   - Administer ordered medications as needed  - Encourage mobilization and activity  - Nutrition services referral to assist patient with appropriate food choices  - Assess stoma site  - Consider wound care consult   Outcome: Progressing  Goal: Oral mucous membranes remain intact  Description: INTERVENTIONS  - Assess oral mucosa and hygiene practices  - Implement preventative oral hygiene regimen  - Implement oral medicated treatments as ordered  - Initiate Nutrition services referral as needed  Outcome: Progressing     Problem: GENITOURINARY - ADULT  Goal: Maintains or returns to baseline urinary function  Description: INTERVENTIONS:  - Assess urinary function  - Encourage oral fluids to ensure adequate hydration if ordered  - Administer IV fluids as ordered to ensure adequate hydration  - Administer ordered medications as needed  - Offer frequent toileting  - Follow urinary retention protocol if ordered  Outcome: Progressing  Goal: Absence of urinary retention  Description: INTERVENTIONS:  - Assess patient’s ability to void and empty bladder  - Monitor I/O  - Bladder scan as needed  - Discuss with physician/AP medications to alleviate retention as needed  - Discuss catheterization for long term situations as appropriate  Outcome: Progressing  Goal: Urinary catheter remains patent  Description: INTERVENTIONS:  - Assess patency of urinary catheter  - If patient has a chronic krishnan, consider changing catheter if non-functioning  - Follow guidelines for intermittent irrigation of non-functioning urinary catheter  Outcome: Progressing     Problem: METABOLIC, FLUID AND  ELECTROLYTES - ADULT  Goal: Electrolytes maintained within normal limits  Description: INTERVENTIONS:  - Monitor labs and assess patient for signs and symptoms of electrolyte imbalances  - Administer electrolyte replacement as ordered  - Monitor response to electrolyte replacements, including repeat lab results as appropriate  - Instruct patient on fluid and nutrition as appropriate  Outcome: Progressing  Goal: Fluid balance maintained  Description: INTERVENTIONS:  - Monitor labs   - Monitor I/O and WT  - Instruct patient on fluid and nutrition as appropriate  - Assess for signs & symptoms of volume excess or deficit  Outcome: Progressing  Goal: Glucose maintained within target range  Description: INTERVENTIONS:  - Monitor Blood Glucose as ordered  - Assess for signs and symptoms of hyperglycemia and hypoglycemia  - Administer ordered medications to maintain glucose within target range  - Assess nutritional intake and initiate nutrition service referral as needed  Outcome: Progressing     Problem: SKIN/TISSUE INTEGRITY - ADULT  Goal: Skin Integrity remains intact(Skin Breakdown Prevention)  Description: Assess:  -Perform Frank assessment every shift  -Clean and moisturize skin every shift/ as needed  -Inspect skin when repositioning, toileting, and assisting with ADLS  -Assess under medical devices such as lines every hour  -Assess extremities for adequate circulation and sensation     Bed Management:  -Have minimal linens on bed & keep smooth, unwrinkled  -Change linens as needed when moist or perspiring  -Avoid sitting or lying in one position for more than 2 hours while in bed  -Keep HOB at 30degrees     Toileting:  -Offer bedside commode    Activity:  -Mobilize patient 4 times a day  -Encourage activity and walks on unit  -Encourage or provide ROM exercises   -Turn and reposition patient every 2 Hours  -Use appropriate equipment to lift or move patient in bed  -Instruct/ Assist with weight shifting every 30  when out of bed in chair  -Consider limitation of chair time 2 hour intervals    Skin Care:  -Avoid use of baby powder, tape, friction and shearing, hot water or constrictive clothing  -Relieve pressure over bony prominences using Allevyn   -Do not massage red bony areas    Next Steps:  -Teach patient strategies to minimize risks such as reposition   -Consider consults to  interdisciplinary teams such as OT/PT  Outcome: Progressing  Goal: Incision(s), wounds(s) or drain site(s) healing without S/S of infection  Description: INTERVENTIONS  - Assess and document dressing, incision, wound bed, drain sites and surrounding tissue  - Provide patient and family education  - Perform skin care/dressing changes every shift/as needed/as ordered  Outcome: Progressing  Goal: Pressure injury heals and does not worsen  Description: Interventions:  - Implement low air loss mattress or specialty surface (Criteria met)  - Apply silicone foam dressing  - Instruct/assist with weight shifting every 30 minutes when in chair   - Limit chair time to 2 hour intervals  - Use special pressure reducing interventions such as cushion when in chair   - Apply fecal or urinary incontinence containment device   - Perform passive or active ROM every hour  - Turn and reposition patient & offload bony prominences every 2 hours   - Utilize friction reducing device or surface for transfers   - Consider consults to  interdisciplinary teams such as OT/PT  - Consider nutrition services referral as needed  Outcome: Progressing     Problem: HEMATOLOGIC - ADULT  Goal: Maintains hematologic stability  Description: INTERVENTIONS  - Assess for signs and symptoms of bleeding or hemorrhage  - Monitor labs  - Administer supportive blood products/factors as ordered and appropriate  Outcome: Progressing     Problem: MUSCULOSKELETAL - ADULT  Goal: Maintain or return mobility to safest level of function  Description: INTERVENTIONS:  - Assess patient's ability to carry  out ADLs; assess patient's baseline for ADL function and identify physical deficits which impact ability to perform ADLs (bathing, care of mouth/teeth, toileting, grooming, dressing, etc.)  - Assess/evaluate cause of self-care deficits   - Assess range of motion  - Assess patient's mobility  - Assess patient's need for assistive devices and provide as appropriate  - Encourage maximum independence but intervene and supervise when necessary  - Involve family in performance of ADLs  - Assess for home care needs following discharge   - Consider OT consult to assist with ADL evaluation and planning for discharge  - Provide patient education as appropriate  Outcome: Progressing  Goal: Maintain proper alignment of affected body part  Description: INTERVENTIONS:  - Support, maintain and protect limb and body alignment  - Provide patient/ family with appropriate education  Outcome: Progressing     Problem: Nutrition/Hydration-ADULT  Goal: Nutrient/Hydration intake appropriate for improving, restoring or maintaining nutritional needs  Description: Monitor and assess patient's nutrition/hydration status for malnutrition. Collaborate with interdisciplinary team and initiate plan and interventions as ordered.  Monitor patient's weight and dietary intake as ordered or per policy. Utilize nutrition screening tool and intervene as necessary. Determine patient's food preferences and provide high-protein, high-caloric foods as appropriate.     INTERVENTIONS:  - Monitor oral intake, urinary output, labs, and treatment plans  - Assess nutrition and hydration status and recommend course of action  - Evaluate amount of meals eaten  - Assist patient with eating if necessary   - Allow adequate time for meals  - Recommend/ encourage appropriate diets, oral nutritional supplements, and vitamin/mineral supplements  - Order, calculate, and assess calorie counts as needed  - Recommend, monitor, and adjust tube feedings and TPN/PPN based on  assessed needs  - Assess need for intravenous fluids  - Provide specific nutrition/hydration education as appropriate  - Include patient/family/caregiver in decisions related to nutrition  Outcome: Progressing

## 2024-01-15 NOTE — ASSESSMENT & PLAN NOTE
Presented to [facility] ED with shortness of breath. Troponin and EKG checked as part of initial workup. EKG without acute ischemic changes   NSTEMI likely related to increased demand from acute hypoxic respiratory failure and septic shock   Troponin 36260 > 50555 > 06438   Cardiac Cath 1/12:Severe in-stent restenosis bifurcation ostial LAD/LCx,  RCA, elevated LVEDP, porcelain aorta.    Plan:  ASA 81mg daily   Heparin Held in setting of GIB- consider Resuming s/p intervention and stable Hg  Atorvastatin 80mg qHS   Continue holding home afterload reduction agents/AV aubrie blockers given  shock:   Carvedilol 6.25mg BID  Isosorbide mononitrate 30mg q24h   Metoprolol succinate 50mg q24  Nifedipine 30mg q24 hours   Cardiology consulted, appreciate recommendations- ?  High risk PCI in the future- further delayed due to GIB   CT Surgery Consulted-recommendations for supportive care or high risk PCI

## 2024-01-15 NOTE — ASSESSMENT & PLAN NOTE
Acute loss anemia  with hemorrhagic shock present on 1/13 with large melanotic stools.  Patient does have history of GI bleed and previous hospitalization, required intervention in 2019 at [facility]  Received 3 units pRBC  1/14 EGD/flex sig: Significant amount of blood in the descending colon, sigmoid colon, and rectum.  No ischemic changes identified.  Single cratered ulcer in the first part of the duodenum with visible vessel, clips applied.  Plan:  Continue Protonix 40 twice daily  Continue to monitor frequent hemoglobin  Continue monitor hemodynamics, consider transfusion/hemoglobin recheck with any decline  GI following

## 2024-01-15 NOTE — ASSESSMENT & PLAN NOTE
Secondary to chronic disease and ESRD  Baseline hemoglobin: 9.6 - 11.8  Acute hypotension anemia on 1/13 requiring transfusion  Blood products:  1/13: 2 unit PRBC CMV negative, irradiated  1/14 1u pRBC    Plan:   Transfuse as indicated in the setting of hemodynamic instability or signs of active bleeding  Monitor Frequent Hg  Plan as above due to GIB

## 2024-01-15 NOTE — ASSESSMENT & PLAN NOTE
Dialysis regimen: Tuesday, Thursday, Saturday via right arm AV fistula   1/8/24: Right IJ temporary HD catheter placement   1/8/24: CRRT initiated   1/9/24: Tolerated -100 to -150 cc/hr volume removal   Re-initiation of CRRT 1/11    Plan:   Continue CRRT given levophed requirements, currently Even, consider advancing based on hemodynamics.    Cinacalcet 30mg daily   Nephrology consulted, appreciate recommendations-

## 2024-01-15 NOTE — TELEPHONE ENCOUNTER
----- Message from Dhiraj Brito DO sent at 1/15/2024 10:36 AM EST -----  Regarding: follow up  Please schedule outpatient follow-up to discuss EGD/colonoscopy in the next 2 to 3 months.

## 2024-01-15 NOTE — PROGRESS NOTES
"Progress Note -  [facility]  Patient: Berto Faria 53 y.o. male   MRN: [id number]  PCP: Dalton Anderson DO  Unit/Bed#: [facility] 515-01 Encounter: [id number]  Length of Stay: 7 days    Assessment/Plan  Berto Faria is a 53 y.o. male with a PMH of HFrEF, ESRD, DM1, CAD w/ prior PEA arrest, Hx of R AKA, Afib who presents as an xfer from [facility] for continued management of NSTEMI and cardiogenic and septic shock. S/p LHC showing stent stenosis of Lcx + LAD stents, RCA . Acute drop of Hb to 4.9, s/p 3u pRBC w/ adequate response, due to duodenal bleeding    Hematochezia, resolved  Acute blood loss anemia, resolved  Normocytic anemia, chronic  Hypotension, improved   - s/p EGD and flex sig 01/14 showing C0M1 Scales's, moderate gastritis, F2A D1 ulcer s/p clip x1 and ovesco clip w/ hemostasis, multiple small F3 D1-D2 ulcers. Flex sig old blood.   - levo down to 1, still on CRRT   - c/w PPI bid for at least 1 month   - advance diet to softs   - given significant cardiac comorbidities, pt not the ideal candidate for anesthesia. If has recurrent bleeding, would recommend CTA ± embo w/ IR   - repeat EGD in 3 months to document healing   - formal colonoscopy as outpatient for CRC screening   - resume AC/AP for cardiac purposes   - monitor BMs. Anticipate residual melena today and tomorrow.     Subjective:  Interval History: NAEO  Last BM yesterday before the scopes, was old blood. Otherwise tolerating PO intake w/o issue. Wants solid foods. Awaiting additional BMs.    Otherwise, pt denies any fevers/chills, lightheadedness, dizziness, CP, SOB, N/V/D/C, abdom pain, urinary symptoms, weakness/numbness/tingling.    ROS otherwise as covered in Interval History.    Objective:  BP (!) 87/51   Pulse 79   Temp 98.4 °F (36.9 °C) (Rectal)   Resp (!) 33   Ht 5' 4\" (1.626 m)   Wt 48.9 kg (107 lb 12.9 oz)   SpO2 98%   BMI 18.50 kg/m²     Intake/Output Summary (Last 24 hours) at 1/15/2024 0841  Last data filed " at 1/15/2024 0800  Gross per 24 hour   Intake 2451.8 ml   Output 1224 ml   Net 1227.8 ml     Physical Exam  Constitutional:       General: He is not in acute distress.     Appearance: Normal appearance.   HENT:      Head: Normocephalic and atraumatic.      Nose: Nose normal.      Mouth/Throat:      Mouth: Mucous membranes are moist.   Eyes:      General: No scleral icterus.     Extraocular Movements: Extraocular movements intact.      Conjunctiva/sclera: Conjunctivae normal.      Pupils: Pupils are equal, round, and reactive to light.   Cardiovascular:      Rate and Rhythm: Normal rate and regular rhythm.   Pulmonary:      Effort: Pulmonary effort is normal.   Abdominal:      General: Abdomen is flat. There is no distension.      Palpations: There is no mass.      Tenderness: There is no abdominal tenderness.   Musculoskeletal:         General: No swelling. Normal range of motion.      Comments: L rad art line, BRONSON Fermin   Skin:     General: Skin is warm and dry.      Capillary Refill: Capillary refill takes less than 2 seconds.   Neurological:      General: No focal deficit present.      Mental Status: He is alert.   Psychiatric:         Mood and Affect: Mood normal.         Labs:  I have reviewed all available labs and imaging results in Epic.  Results from last 7 days   Lab Units 01/15/24  0425 01/14/24  2208 01/14/24  1534 01/13/24  0901 01/13/24  0243 01/12/24  0844 01/12/24  0431   SODIUM mmol/L 135 133* 133*   < >  --    < >  --    POTASSIUM mmol/L 4.9 5.2 4.7   < >  --    < >  --    CHLORIDE mmol/L 104 105 108   < >  --    < >  --    CO2 mmol/L 22 19* 19*   < >  --    < >  --    BUN mg/dL 18 22 24   < >  --    < >  --    CREATININE mg/dL 1.99* 2.04* 2.25*   < >  --    < >  --    GLUCOSE RANDOM mg/dL 148* 205* 133   < >  --    < >  --    CALCIUM mg/dL 8.5 8.7 8.6   < >  --    < >  --    ALBUMIN g/dL  --   --   --   --  3.2*  --  3.2*   ALK PHOS U/L  --   --   --   --  56  --  57   ALT U/L  --   --    --   --  13  --  12   AST U/L  --   --   --   --  19  --  18   EGFR ml/min/1.73sq m 36 35 31   < >  --    < >  --     < > = values in this interval not displayed.     Results from last 7 days   Lab Units 01/15/24  0425 01/14/24  2208 01/14/24  1534 01/14/24  1001 01/14/24  0338 01/13/24  2343 01/13/24  2316 01/13/24  0223 01/12/24  1209 01/12/24  0431 01/10/24  0336 01/09/24  0334   WBC Thousand/uL 17.31*  --   --   --  11.80*  --  13.65* 8.93  --  10.09   < > 13.49*   HEMOGLOBIN g/dL 8.1* 7.9* 7.4*   < > 7.2*   < > 4.9* 7.1*   < > 7.8*   < > 8.9*   HEMATOCRIT % 23.1*  --   --   --  22.0*  --  15.1* 21.6*  --  22.9*   < > 27.6*   PLATELETS Thousands/uL 131*  --   --   --  150  --  187 170  --  198   < > 187   NEUTROS PCT %  --   --   --   --   --   --   --  58  --  61  --  83*   LYMPHS PCT %  --   --   --   --   --   --   --  28  --  26  --  9*   MONOS PCT %  --   --   --   --   --   --   --  11  --  10  --  7   EOS PCT %  --   --   --   --   --   --   --  2  --  2  --  0   BASOS PCT %  --   --   --   --   --   --   --  0  --  0  --  0   NEUTROS ABS Thousands/µL  --   --   --   --   --   --   --  5.27  --  6.19  --  11.28*   LYMPHS ABS Thousands/µL  --   --   --   --   --   --   --  2.47  --  2.62  --  1.16   MONOS ABS Thousand/µL  --   --   --   --   --   --   --  0.95  --  1.02  --  0.95   EOS ABS Thousand/µL  --   --   --   --   --   --   --  0.15  --  0.16  --  0.00   BASOS ABS Thousands/µL  --   --   --   --   --   --   --  0.03  --  0.03  --  0.01    < > = values in this interval not displayed.     Results from last 7 days   Lab Units 01/13/24  2238 01/13/24  1555 01/13/24  0901 01/13/24  0012 01/12/24  0232 01/11/24  2005 01/11/24  1354   PROTIME seconds  --   --   --   --  15.3*  --  14.8*   INR   --   --   --   --  1.23*  --  1.17   PTT seconds 58* 47* 46*   < > 74*   < > 45*    < > = values in this interval not displayed.           Additional Labs:  Results from last 7 days   Lab Units 01/15/24  0425  01/14/24  2208 01/14/24  1534 01/14/24  1001 01/14/24  0336 01/13/24  2316   LACTIC ACID mmol/L  --   --  0.9  --  1.5 2.8*   MAGNESIUM mg/dL 2.0 2.1 1.9   < > 1.9 1.9   PHOSPHORUS mg/dL 2.9 2.8 3.1   < > 3.2 2.6*    < > = values in this interval not displayed.      Results from last 7 days   Lab Units 01/14/24  1534   PH ART  7.353   PCO2 ART mm Hg 33.7*   PO2 ART mm Hg 119.0   HCO3 ART mmol/L 18.3*   BASE EXC ART mmol/L -6.6   ABG SOURCE  Line, Arterial     Results from last 7 days   Lab Units 01/12/24  0431   TSH 3RD GENERATON uIU/mL 0.738       Imaging:  VAS limited iliac-femoral evaluation for Impella Device   Final Result by Kt Beyer DO (01/13 2151)      IR temporary dialysis catheter check/change/reposition   Final Result by Tony Francisco MD (01/09 1619)   Nontunneled dialysis catheter repositioning.      Workstation performed: [id number]         XR chest portable ICU   Final Result by Vlad Mak MD (01/08 1702)      No change right IJ line presumed within right subclavian vein.      Mild cardiomegaly.      Mild vascular congestion.      Persistent bibasilar infiltrates under atelectasis, worsening on the right.      Small right effusion.      The study was marked in EPIC for immediate notification.                  Workstation performed: [id number]             Medications:   Current Facility-Administered Medications   Medication Dose Route Frequency Provider Last Rate    acetaminophen  650 mg Oral Q6H PRN Rosalva Taylor PA-C      albuterol  2 puff Inhalation Q4H PRN Katie Velasco, BERLINNP      amiodarone (CORDARONE) 900 mg in dextrose 5 % 500 mL infusion  0.5 mg/min Intravenous Continuous Katie Ellaek, CRNP 0.5 mg/min (01/14/24 1800)    amiodarone  200 mg Oral TID With Meals Katie Velasco, CRNP      aspirin  81 mg Oral QAM Katie Ellaek, BERLINNP      atorvastatin  80 mg Oral Daily With Dinner Katie Grecsek, CRNP      calcitriol  0.5 mcg Oral Once per day on Tuesday Thursday  Saturday Katie Grecsek, CRNP      cefepime  1,000 mg Intravenous Q12H Katie Ellaek, CRNP Stopped (01/15/24 0000)    cinacalcet  30 mg Oral Daily Katie Grecsek, CRNP      glycerin-hypromellose-  1 drop Right Eye Q4H PRN Katie Grecsek, CRNP      insulin glargine  10 Units Subcutaneous Q12H JAMARCUS Katie Grecsek, CRNP      insulin lispro  1-6 Units Subcutaneous HS Katie Grecsek, CRNP      insulin lispro  1-6 Units Subcutaneous TID With Meals Katie Velasco, CRNP      insulin lispro  8 Units Subcutaneous TID With Meals Katie Velasco, GAVINO      norepinephrine  1-30 mcg/min Intravenous Titrated Andre Mendoza PA-C 1 mcg/min (01/15/24 0636)    NxStage K 4/Ca 3  20,000 mL Dialysis Continuous Wendy K Doug, DO      ondansetron  4 mg Intravenous Q8H PRN Katie Ellaek, CRNP      pantoprazole  40 mg Intravenous Q12H [facility] Andre Mendoza PA-C      predniSONE  5 mg Oral Daily Katie Ellaek, CRNP      tacrolimus  1 mg Oral QPM Katie Rose Mariecsek, CRNP      tacrolimus  2 mg Oral QAM Katie Jaramilloek, BERLINNP         Problem List:  Patient Active Problem List   Diagnosis    Type 1 diabetes mellitus on insulin therapy ([facility])    Renal transplant, status post    Hyperkalemia    Osteomyelitis ([facility])    Poor circulation    Closed fracture of distal end of right tibia with routine healing    Chronic osteomyelitis of tibia ([facility])    Immunosuppression ([facility])    Hypertension    Elevated troponin    Diarrhea    Cellulitis of ankle    NSTEMI (non-ST elevated myocardial infarction) ([facility])    Urinary retention    Severe sepsis ([facility])    Acidosis    Hyperphosphatemia    Hyponatremia    GI bleed w/ Hemorrhagic Shock    S/P AKA (above knee amputation), right ([facility])    Acute blood loss anemia    Acute respiratory failure with hypoxia ([facility])    Pulmonary hypertension ([facility])    Acute on chronic anemia    Depressed left ventricular ejection fraction    Acute pulmonary edema ([facility])    ESRD (end stage renal disease) ([facility])    Coronary artery  disease involving native coronary artery of native heart without angina pectoris    Pre-operative cardiovascular examination    Chronic kidney disease, unspecified    Pancreatic lesion    Abnormal CT scan, colon    Community acquired pneumonia of right lower lobe of lung    Multifocal pneumonia    Elevated MCV    Hyperbilirubinemia    Acute on chronic diastolic CHF (congestive heart failure) (HCC)    Elevated procalcitonin    Mitral valve insufficiency    Abnormal EKG    Vasovagal syncope    Acute on chronic diastolic (congestive) heart failure (HCC)    Septic shock (HCC)    Hx of AKA (above knee amputation) (HCC)    Renal transplant failure and rejection    New onset a-fib (HCC)    Cardiogenic shock (HCC)       Case discussed with attending physician Dr. Herrera. All recs are preliminary pending final attestation.    Gurwinder John, PGY-4

## 2024-01-15 NOTE — ASSESSMENT & PLAN NOTE
New-onset in AM of 1/11. With occasional RVR with associated hypotension. Amiodarone bolus x1 given at RRT while in dialysis 1/11 AM.    Plan:  Currently NSR  Continue PO amiodarone infusion, consider discontinuing drip today if tolerating PO   Consider resuming Heparin

## 2024-01-15 NOTE — ASSESSMENT & PLAN NOTE
Likely related to NSTEMI from septic shock and element of stress cardiomyopathy, but cannot rule about possible ischemic element given regional wall changes   1/7/24 TTE: LV cavity size is normal. Wall thickness is mildly increased. LVEF 40%. Systolic function is moderately reduced. Following segments are hypokinetic: mid anteroseptal, mid inferior, apical anterior, apical septal, apical inferior and apex.RV normal. Mild MR. Mild TR    Plan:   Volume removal with CRRT  Daily weights  Continue holding home afterload reduction agents/AV aubrie blockers given resolving septic/ cardiogenic/ hemorrhagic  shock:   Carvedilol 6.25mg BID  Isosorbide mononitrate 30mg q24h   Metoprolol succinate 50mg q24  Nifedipine 30mg q24 hours   Meds as above for NSTEMI  Cardiology consulted, appreciate recommendations

## 2024-01-15 NOTE — PROGRESS NOTES
Cardiology Team 2 Progress Note - Berto Faria 53 y.o. male MRN: [id number]  Unit/Bed#: [facility] 515-01 Encounter: [id number]          Subjective:   Patient seen and examined. Went for EGD with flex sigmoidoscopy now s/p duodenal lip x1 with ovesco clip w/ hemostasis. Hgb stable. Remained on Levo at 1mcg/min overnight, weaned off at 10am and Epi at 1. Otherwise, he remained hemodynamically stable.       Assessment:  Berto Faria is a 53 y.o. male with a PMH of HFrEF, ESRD, DM1, CAD w/ prior PEA arrest, Hx of R AKA, Afib who [facility] an [facility] for continued management of NSTEMI and cardiogenic and septic shock. S/p LHC showing stent stenosis of Lcx + LAD stents, RCA . Acute drop of Hb to 4.9, s/p 3u pRBC w/ adequate response, due to duodenal bleeding now s/p clip x1 with ovesco clip w/ hemostasis.     Principal Problem:    Cardiogenic shock (HCC)  Active Problems:    Type 1 diabetes mellitus on insulin therapy (HCC)    NSTEMI (non-ST elevated myocardial infarction) ([facility])    Acidosis    GI bleed w/ Hemorrhagic Shock    S/P AKA (above knee amputation), right (HCC)    Acute respiratory failure with hypoxia (HCC)    Acute on chronic anemia    ESRD (end stage renal disease) ([facility])    Multifocal pneumonia    Acute on chronic diastolic (congestive) heart failure ([facility])    Septic shock ([facility])    Renal transplant failure and rejection    New onset a-fib ([facility])      Assessment:  1.Shock - septic/cardiogenic/hemorrhagic     - Presented as a transfer from  meets as NSTEMI, developed cardio- and distributive- shock  -Status: SCAI stage B-C, warm without evidence of endorgan damage  - TTE(1/11/24): LVIDD 5 cm, eccentric hypertrophy, LVEF 32%, severe global hypokinesis with wall motion abnormalities in anteroseptal, inferior and apical walls.  Mild to moderate MR, mild to moderate TR with eRVSP of 57.   - LHC(1/12/24): Right femoral access- Ostial LAD 95%, ostial circumflex to proximal circumflex 75%, proximal  %, mid LAD 60%, distal left main 50%, first marginal 100%.  Severe in-stent restenosis at the bifurcation of ostial LAD/left circumflex; moderately elevated LVEDP  - CABG turn down   - VAS limited iliac-femoral eval for IMPELLA:    - Right ilio-femoral: 0.4-0.7 cm    - Left ilio-femoral: 0.4-0.7 cm   - distal abdominal aorta- 1.1 cm     2.Paroxysmal atrial fibrillation  -   New diagnosis of A-fib, on 1/11, occasional RVR with associated hypotension.  -Currently on Amio drip in normal sinus rhythm    3.Acute HFrEF-   -In setting of NSTEMI and septic shock superimposed on ischemic cardiomyopathy  -Preload: On CRRT  -Afterload: Was taken off pressor support today, will monitor for 24 hours prior to resuming home meds   -Home medication: Coreg 6.25 mg twice daily, isosorbide mononitrate 30 mg daily, metoprolol succinate 50 mg daily, nifedipine 30 mg daily    4.Multifocal pneumonia-resolved  5.Cardiomyopathy - ischemic - EF 30%  6.Acute respiratory failure with hypoxia  7.Type 2 myocardial infarction secondary to #2/4  8.ESRD on HD - current on CRRT  9.Renal transplant failure/rejection   10.Right AKA secondary to osteomyelitis   11.Type 1 diabetes mellitus  12.duodenal ulcer-now status post x 1 ovesco clip(1/13/2024)      Plan:    CHIP tentatively scheduled for Thursday, 1/18/2024 with Dr. Bloom; likely IMPELLA assisted   GI has cleared him to resume anticoagulation(for pAF) and antiplatelet( before cath).   Please start DAPT from tonight and monitor for signs of GIB  Consider resumption of Heparin GGT on Wednesday, mainly for NSTEMI and pAF.   CRRT as per nephrology, would greatly appreciate it if patient can be optimized for cath on Thursday.       Case discussed and reviewed with Dr. Murray who agrees with my assessment and plan.Thank you for involving us in the care of your patient.    Rodger Crespo MD  Cardiology Fellow   PGY-5      Vitals: Blood pressure (!) 87/51, pulse 79, temperature 98.4 °F (36.9  "°C), temperature source Rectal, resp. rate (!) 33, height 5' 4\" (1.626 m), weight 48.9 kg (107 lb 12.9 oz), SpO2 98%., Body mass index is 18.5 kg/m².,   Orthostatic Blood Pressures      Flowsheet Row Most Recent Value   Blood Pressure 87/51 filed at 01/15/2024 0800   Patient Position - Orthostatic VS Lying filed at 2024 1600              Intake/Output Summary (Last 24 hours) at 1/15/2024 0829  Last data filed at 1/15/2024 0800  Gross per 24 hour   Intake 2451.8 ml   Output 1224 ml   Net 1227.8 ml       Review of System:  Review of system was conducted and was negative except for as stated in the subjective course.    Physical Exam:    GEN: Betro Faria appears well, alert and oriented x 3, pleasant and cooperative   NECK: No JVD or carotid bruits; right HD catheter  HEART: Regular rhythm, normal rate, normal S1 and S2, no murmurs, clicks, gallops or rubs   LUNGS: Clear to auscultation bilaterally; no wheezes, rales, or rhonchi; respiration nonlabored   ABDOMEN:  Normoactive bowel sounds, soft, no tenderness, no distention  EXTREMITIES: Right AKA        Current Facility-Administered Medications:     acetaminophen (TYLENOL) tablet 650 mg, 650 mg, Oral, Q6H PRN, Rosalva Taylor PA-C, 650 mg at 01/15/24 0504    albuterol (PROVENTIL HFA,VENTOLIN HFA) inhaler 2 puff, 2 puff, Inhalation, Q4H PRN, Katie Grecsek, CRNP    [] amiodarone (CORDARONE) 900 mg in dextrose 5 % 500 mL infusion, 1 mg/min, Intravenous, Continuous, Stopped at 24 1711 **FOLLOWED BY** amiodarone (CORDARONE) 900 mg in dextrose 5 % 500 mL infusion, 0.5 mg/min, Intravenous, Continuous, Katie Grecsek, CRNP, Last Rate: 16.7 mL/hr at 24 1800, 0.5 mg/min at 24 1800    amiodarone tablet 200 mg, 200 mg, Oral, TID With Meals, Katie Grecsek, CRNP, 200 mg at 01/15/24 0729    aspirin chewable tablet 81 mg, 81 mg, Oral, QAM, GAVINO Baker, 81 mg at 01/15/24 0809    atorvastatin (LIPITOR) tablet 80 mg, 80 mg, Oral, " Daily With Dinner, BERLIN BakerNP, 80 mg at 01/14/24 1658    calcitriol (ROCALTROL) capsule 0.5 mcg, 0.5 mcg, Oral, Once per day on Tuesday Thursday Saturday, Katie Velasco, GAVINO, 0.5 mcg at 01/13/24 0803    cefepime (MAXIPIME) 1,000 mg in dextrose 5 % 50 mL IVPB, 1,000 mg, Intravenous, Q12H, GAVINO Baker, Stopped at 01/15/24 0000    cinacalcet (SENSIPAR) tablet 30 mg, 30 mg, Oral, Daily, GAVINO Baker, 30 mg at 01/15/24 0809    glycerin-hypromellose- (ARTIFICIAL TEARS) ophthalmic solution 1 drop, 1 drop, Right Eye, Q4H PRN, GAVINO Baker    insulin glargine (LANTUS) subcutaneous injection 10 Units 0.1 mL, 10 Units, Subcutaneous, Q12H JAMARCUS, BERLIN BakerNP, 10 Units at 01/15/24 0809    insulin lispro (HumaLOG) 100 units/mL subcutaneous injection 1-6 Units, 1-6 Units, Subcutaneous, HS, GAVINO Baker, 2 Units at 01/14/24 0216    insulin lispro (HumaLOG) 100 units/mL subcutaneous injection 1-6 Units, 1-6 Units, Subcutaneous, TID With Meals, 1 Units at 01/15/24 0727 **AND** Fingerstick Glucose (POCT), , , TID AC, Katie Velasco CRNP    insulin lispro (HumaLOG) 100 units/mL subcutaneous injection 8 Units, 8 Units, Subcutaneous, TID With Meals, GAVINO Baker, 5 Units at 01/13/24 1353    norepinephrine (LEVOPHED) 4 mg (STANDARD CONCENTRATION) IV in sodium chloride 0.9% 250 mL, 1-30 mcg/min, Intravenous, Titrated, Andre Mendoza PA-C, Last Rate: 3.8 mL/hr at 01/15/24 0636, 1 mcg/min at 01/15/24 0636    NxStage K 4/Ca 3 dialysis solution (RFP-401) 20,000 mL, 20,000 mL, Dialysis, Continuous, Wendy K Doug, DO, 20,000 mL at 01/15/24 0537    ondansetron (ZOFRAN) injection 4 mg, 4 mg, Intravenous, Q8H PRN, Katie Velasco, CRNP, 4 mg at 01/14/24 0707    pantoprazole (PROTONIX) injection 40 mg, 40 mg, Intravenous, Q12H JAMARCUS, Andre Mendoza PA-C, 40 mg at 01/15/24 0504    predniSONE tablet 5 mg, 5 mg, Oral, Daily, Katie Velasco, CRNP, 5 mg at 01/15/24 0809     tacrolimus (PROGRAF) capsule 1 mg, 1 mg, Oral, QPM, Katie Grecsek, CRNP, 1 mg at 01/14/24 1700    tacrolimus (PROGRAF) capsule 2 mg, 2 mg, Oral, QAM, Katie Grecsek, CRNP, 2 mg at 01/15/24 0809    Labs & Results:  Results from last 7 days   Lab Units 01/11/24 2229 01/11/24  2035 01/11/24  1840   HS TNI 0HR ng/L  --   --  11,399*   HS TNI 2HR ng/L  --  10,340*  --    HS TNI 4HR ng/L 9,884*  --   --          Results from last 7 days   Lab Units 01/15/24  0425 01/14/24  2208 01/14/24  1534   POTASSIUM mmol/L 4.9 5.2 4.7   CO2 mmol/L 22 19* 19*   CHLORIDE mmol/L 104 105 108   BUN mg/dL 18 22 24   CREATININE mg/dL 1.99* 2.04* 2.25*     Results from last 7 days   Lab Units 01/15/24  0425 01/14/24 2208 01/14/24  1534 01/14/24  1001 01/14/24  0338 01/13/24  2343 01/13/24  2316   HEMOGLOBIN g/dL 8.1* 7.9* 7.4*   < > 7.2*   < > 4.9*   HEMATOCRIT % 23.1*  --   --   --  22.0*  --  15.1*   PLATELETS Thousands/uL 131*  --   --   --  150  --  187    < > = values in this interval not displayed.           Telemetry:   Personally reviewed by Rodger Crespo MD: NSR

## 2024-01-15 NOTE — PROGRESS NOTES
NEPHROLOGY PROGRESS NOTE   Berto Faria 53 y.o. male MRN: [id number]  Unit/Bed#: [facility] 515-01 Encounter: [id number]      ASSESSMENT & PLAN:  #1 ESRD on HD TTS at [facility].  Patient was transitioned to CRRT due to hypotension in the setting of rapid A-fib.  Patient seen and examined this morning on CRRT, pressor support was weaned off an hour ago.  Continue with current CRRT prescription for now.  If patient remains hemodynamically stable will consider transition to IHD over the next 24 to 48 hours    #2 new onset rapid A-fib, cardiology on board    #3 shock, suspected septic versus cardiogenic, acute HFrEF, currently off Levophed that was weaned to off over an hour ago    #4 anemia in the setting of ESRD as well as concern for GI bleeding with hematochezia status post EGD and flex sig microscopy by GI.  Status post transfusion.  Monitor H&H    #5 history of failed kidney transplant, continue immunosuppression, they should be titrating down to off as per outpatient nephrologist    #6 type II MI status post cardiac cath with in-stent restenosis of left circumflex and LAD stents    Continue with CRRT for now, recommendation was discussed with critical care team they agree with the plan, if patient remains hemodynamically stable will consider transition to IHD over the next 24 to 48 hours.    SUBJECTIVE:  Patient seen and examined on CRRT, feeling tired but in general okay significant chest pain and shortness of breath, denies any abdominal pain, no nausea, no vomiting, Levophed was weaned off recently    OBJECTIVE:  Current Weight: Weight - Scale: 48.9 kg (107 lb 12.9 oz)  Vitals:    01/15/24 1120   BP: 115/55   Pulse: 82   Resp: 19   Temp: 98.6 °F (37 °C)   SpO2: 100%       Intake/Output Summary (Last 24 hours) at 1/15/2024 1138  Last data filed at 1/15/2024 1100  Gross per 24 hour   Intake 2400.8 ml   Output 1356 ml   Net 1044.8 ml       General: conscious, cooperative, in not acute distress  Eyes:  conjunctivae pink, anicteric sclerae  ENT: lips and mucous membranes moist, on room air  Neck: supple, no JVD  Chest: clear breath sounds bilateral, no crackles, ronchus or wheezings  CVS: distinct S1 & S2, normal rate, regular rhythm  Abdomen: soft, non-tender, non-distended, normoactive bowel sounds  Extremities: no significant edema of both legs, right AKA  Skin: no rash  Neuro: awake, alert, oriented  Genitourinary Chowdary catheter in place  Temporary HD line accessed with good blood flow      Medications:    Current Facility-Administered Medications:     acetaminophen (TYLENOL) tablet 650 mg, 650 mg, Oral, Q6H PRN, Rosalva Taylor PA-C, 650 mg at 01/15/24 0504    albuterol (PROVENTIL HFA,VENTOLIN HFA) inhaler 2 puff, 2 puff, Inhalation, Q4H PRN, Katie Velasoc, CRNP    [] amiodarone (CORDARONE) 900 mg in dextrose 5 % 500 mL infusion, 1 mg/min, Intravenous, Continuous, Stopped at 24 1711 **FOLLOWED BY** amiodarone (CORDARONE) 900 mg in dextrose 5 % 500 mL infusion, 0.5 mg/min, Intravenous, Continuous, Katie Rose Mariecsek, CRNP, Last Rate: 16.7 mL/hr at 24 1800, 0.5 mg/min at 24 1800    amiodarone tablet 200 mg, 200 mg, Oral, TID With Meals, Katie Rose Mariecsek, CRNP, 200 mg at 01/15/24 1126    aspirin chewable tablet 81 mg, 81 mg, Oral, QAM, Katie Grecsek, CRNP, 81 mg at 01/15/24 0809    atorvastatin (LIPITOR) tablet 80 mg, 80 mg, Oral, Daily With Dinner, Katie Rose Mariecsek, CRNP, 80 mg at 24 1658    calcitriol (ROCALTROL) capsule 0.5 mcg, 0.5 mcg, Oral, Once per day on , Katie Rose Mariecsek, CRNP, 0.5 mcg at 24 0803    cefepime (MAXIPIME) 1,000 mg in dextrose 5 % 50 mL IVPB, 1,000 mg, Intravenous, Q12H, BERLIN BakerNP, Last Rate: 100 mL/hr at 01/15/24 1024, 1,000 mg at 01/15/24 1024    cinacalcet (SENSIPAR) tablet 30 mg, 30 mg, Oral, Daily, BERLIN BakerNP, 30 mg at 01/15/24 0809    glycerin-hypromellose- (ARTIFICIAL TEARS) ophthalmic  solution 1 drop, 1 drop, Right Eye, Q4H PRN, Katie Jaramilloek, CRNP    insulin glargine (LANTUS) subcutaneous injection 10 Units 0.1 mL, 10 Units, Subcutaneous, Q12H JAMARCUS, Katie Velasco CRNP, 10 Units at 01/15/24 0809    insulin lispro (HumaLOG) 100 units/mL subcutaneous injection 1-6 Units, 1-6 Units, Subcutaneous, HS, Katie Velasco CRNP, 2 Units at 01/14/24 0216    insulin lispro (HumaLOG) 100 units/mL subcutaneous injection 1-6 Units, 1-6 Units, Subcutaneous, TID With Meals, 1 Units at 01/15/24 0727 **AND** Fingerstick Glucose (POCT), , , TID AC, Katie Jaramilloek, CRNP    insulin lispro (HumaLOG) 100 units/mL subcutaneous injection 8 Units, 8 Units, Subcutaneous, TID With Meals, Katie Velasco CRNP, 5 Units at 01/13/24 1353    magnesium sulfate IVPB (premix) SOLN 1 g, 1 g, Intravenous, Once, Nieves Rios MD    norepinephrine (LEVOPHED) 4 mg (STANDARD CONCENTRATION) IV in sodium chloride 0.9% 250 mL, 1-30 mcg/min, Intravenous, Titrated, Andre Mendoza PA-C, Held at 01/15/24 1001    NxStage K 4/Ca 3 dialysis solution (RFP-401) 20,000 mL, 20,000 mL, Dialysis, Continuous, Wendyjosue Camacho, DO, 20,000 mL at 01/15/24 0537    ondansetron (ZOFRAN) injection 4 mg, 4 mg, Intravenous, Q8H PRN, Katie Velasco CRNP, 4 mg at 01/14/24 0707    pantoprazole (PROTONIX) injection 40 mg, 40 mg, Intravenous, Q12H JAMARCUS, Andre Mendoza PA-C, 40 mg at 01/15/24 0504    predniSONE tablet 5 mg, 5 mg, Oral, Daily, Katie Trottercsek, CRNP, 5 mg at 01/15/24 0809    sodium phosphate 6 mmol in sodium chloride 0.9 % 100 mL Infusion, 6 mmol, Intravenous, Once, Nieves Rios MD    tacrolimus (PROGRAF) capsule 1 mg, 1 mg, Oral, QPM, GAVINO Baker, 1 mg at 01/14/24 1700    tacrolimus (PROGRAF) capsule 2 mg, 2 mg, Oral, QAM, GAVINO Baker, 2 mg at 01/15/24 0809    Invasive Devices:        Lab Results:   Results from last 7 days   Lab Units 01/15/24  0958 01/15/24  0425 01/14/24  2208 01/14/24  1001 01/14/24  0338 01/13/24  2343  "01/13/24  2316 01/13/24  0901 01/13/24  0243 01/12/24  0844 01/12/24  0431   WBC Thousand/uL  --  16.79*  17.31*  --   --  11.80*  --  13.65*  --   --    < > 10.09   HEMOGLOBIN g/dL 8.1* 7.9*  8.1* 7.9*   < > 7.2*   < > 4.9*  --   --    < > 7.8*   HEMATOCRIT %  --  23.2*  23.1*  --   --  22.0*  --  15.1*  --   --    < > 22.9*   PLATELETS Thousands/uL  --  131*  --   --  150  --  187  --   --    < > 198   SODIUM mmol/L 134* 135 133*   < >  --    < > 136   < >  --    < >  --    POTASSIUM mmol/L 4.6 4.9 5.2   < >  --    < > 4.4   < >  --    < >  --    CHLORIDE mmol/L 105 104 105   < >  --    < > 105   < >  --    < >  --    CO2 mmol/L 23 22 19*   < >  --    < > 23   < >  --    < >  --    BUN mg/dL 18 18 22   < >  --    < > 23   < >  --    < >  --    CREATININE mg/dL 1.89* 1.99* 2.04*   < >  --    < > 1.92*   < >  --    < >  --    CALCIUM mg/dL 8.3* 8.5 8.7   < >  --    < > 8.3*   < >  --    < >  --    MAGNESIUM mg/dL 1.9 2.0 2.1   < >  --    < > 1.9   < >  --    < > 2.1   PHOSPHORUS mg/dL 2.2* 2.9 2.8   < >  --    < > 2.6*   < >  --    < > 2.8   ALK PHOS U/L  --   --   --   --   --   --   --   --  56  --  57   ALT U/L  --   --   --   --   --   --   --   --  13  --  12   AST U/L  --   --   --   --   --   --   --   --  19  --  18    < > = values in this interval not displayed.       Previous work up:  See previous notes      Portions of the record may have been created with voice recognition software. Occasional wrong word or \"sound a like\" substitutions may have occurred due to the inherent limitations of voice recognition software. Read the chart carefully and recognize, using context, where substitutions have occurred.If you have any questions, please contact the dictating provider.  "

## 2024-01-15 NOTE — PROGRESS NOTES
[facility]  Progress Note  Name: Berto Farai I  MRN: [id number]  Unit/Bed#: [facility] 515-01 I Date of Admission: 1/8/2024   Date of Service: 1/15/2024 I Hospital Day: 7    Assessment/Plan   * Cardiogenic shock (HCC)  Assessment & Plan  As evidenced by low ScvO2, cool extremities, and newly depressed/worsening EF on STAT echo 01/11.   Now resolved     01/11 Echo:    Left Ventricle: Left ventricular cavity size is mildly dilated. Wall thickness is normal. There is eccentric hypertrophy. The left ventricular ejection fraction is 32%. Systolic function is severely reduced. There is severe global hypokinesis with specific regional wall abnormalities in the anteroseptal, inferior and apical regions. Diastolic function is abnormal.    Left Atrium: The atrium is moderately dilated.    Mitral Valve: There is mild to moderate regurgitation.    Tricuspid Valve: There is mild to moderate regurgitation. The right ventricular systolic pressure is moderately elevated. The estimated right ventricular systolic pressure is 57.00 mmHg.    Plan:  Weaned off Milrinone 1/14  Monitor endpoints of resuscitation  Continuous cardiopulmonary monitoring  Continue CRRT for volume management    GI bleed w/ Hemorrhagic Shock  Assessment & Plan  Acute loss anemia  with hemorrhagic shock present on 1/13 with large melanotic stools.  Patient does have history of GI bleed and previous hospitalization, required intervention in 2019 at [facility]  Received 3 units pRBC  1/14 EGD/flex sig: Significant amount of blood in the descending colon, sigmoid colon, and rectum.  No ischemic changes identified.  Single cratered ulcer in the first part of the duodenum with visible vessel, clips applied.  Plan:  Continue Protonix 40 twice daily  Continue to monitor frequent hemoglobin  Continue monitor hemodynamics, consider transfusion/hemoglobin recheck with any decline  GI following    NSTEMI (non-ST elevated myocardial  infarction) (HCC)  Assessment & Plan  Presented to [facility] ED with shortness of breath. Troponin and EKG checked as part of initial workup. EKG without acute ischemic changes   NSTEMI likely related to increased demand from acute hypoxic respiratory failure and septic shock   Troponin 56298 > 56146 > 26661   Cardiac Cath 1/12:Severe in-stent restenosis bifurcation ostial LAD/LCx,  RCA, elevated LVEDP, porcelain aorta.    Plan:  ASA 81mg daily   Heparin Held in setting of GIB- consider Resuming s/p intervention and stable Hg  Atorvastatin 80mg qHS   Continue holding home afterload reduction agents/AV aubrie blockers given  shock:   Carvedilol 6.25mg BID  Isosorbide mononitrate 30mg q24h   Metoprolol succinate 50mg q24  Nifedipine 30mg q24 hours   Cardiology consulted, appreciate recommendations- ?  High risk PCI in the future- further delayed due to GIB   CT Surgery Consulted-recommendations for supportive care or high risk PCI    New onset a-fib (HCC)  Assessment & Plan  New-onset in AM of 1/11. With occasional RVR with associated hypotension. Amiodarone bolus x1 given at RRT while in dialysis 1/11 AM.    Plan:  Currently NSR  Continue PO amiodarone infusion, consider discontinuing drip today if tolerating PO   Consider resuming Heparin     Acute on chronic diastolic (congestive) heart failure (HCC)  Assessment & Plan  Likely related to NSTEMI from septic shock and element of stress cardiomyopathy, but cannot rule about possible ischemic element given regional wall changes   1/7/24 TTE: LV cavity size is normal. Wall thickness is mildly increased. LVEF 40%. Systolic function is moderately reduced. Following segments are hypokinetic: mid anteroseptal, mid inferior, apical anterior, apical septal, apical inferior and apex.RV normal. Mild MR. Mild TR    Plan:   Volume removal with CRRT  Daily weights  Continue holding home afterload reduction agents/AV aubrie blockers given resolving septic/ cardiogenic/ hemorrhagic   shock:   Carvedilol 6.25mg BID  Isosorbide mononitrate 30mg q24h   Metoprolol succinate 50mg q24  Nifedipine 30mg q24 hours   Meds as above for NSTEMI  Cardiology consulted, appreciate recommendations      Multifocal pneumonia  Assessment & Plan  Presented 23 to [facility] with new oxygen requirement, fatigue   Relevant imagin/7/24 CT Chest: Bilateral multilevel airspace consolidation, groundglass opacities, septal thickening and small bilateral pleural effusions secondary to volume overloaded as well as pneumonia.  Initial procalcitonin: 3.16 (24)  Possibly falsely elevated due to ESRD, but had lower levels in the past   Cultures:  24 BCXx2: No growth  23 COVID/Flu/RSV: Negative   23 RP2: Negative   24 MRSA Cx: Negative    24 BCXx2: No growth     Plan:   Current Antibiotics: Cefepime to complete coure   Continue to monitor fever and WBC curve   Follow up cultures      ESRD (end stage renal disease) ([facility])  Assessment & Plan  Dialysis regimen: Tuesday, Thursday, Saturday via right arm AV fistula   24: Right IJ temporary HD catheter placement   24: CRRT initiated   24: Tolerated -100 to -150 cc/hr volume removal   Re-initiation of CRRT     Plan:   Continue CRRT given levophed requirements, currently Even, consider advancing based on hemodynamics.    Cinacalcet 30mg daily   Nephrology consulted, appreciate recommendations-    Septic shock ([facility])  Assessment & Plan  Resolved  Patient presented to the ER 24 with fatigue and hypoxia, suspected source of bacterial pneumonia   Likely component of adrenal insufficiency given chronic, daily prednisone use   Not given 30ml/kg IVF resuscitation in the setting of anuric ESRD      Plan:  Maintain MAP 60-65 given ESRD  Tolerating volume removal with CRRT  Continue Cefepime    Type 1 diabetes mellitus on insulin therapy ([facility])  Assessment & Plan  Lab Results   Component Value Date    HGBA1C 7.7 (H) 2024    HGBA1C 6.5 (H)  02/23/2018   Anticipate hyperglycemia associated with higher steroid dose    Plan:   Continue AC/HS sliding scale algorithm   Lispro 8 units TID with meals  Lantus 10 units q12h   Adjust insulin regimen as needed to maintain goal -180  Carb controlled diet   Patient intermittently non-complaint with Insulin     Renal transplant failure and rejection  Assessment & Plan  History of renal transplant chronically on tacrolimus 1 mg p.o. nightly, 2 mg p.o. every morning, AZA 50 daily, Prednisone 5 Daily    Plan:  Continue tacrolimus 2mg qAM, 1mg qPM  Prednisone 5 mg qD  Holding home azathioprine 50mg daily   Nephrology consulted, appreciate recommendations   Reach out to transplant center for update regarding these medications    Acute on chronic anemia  Assessment & Plan  Secondary to chronic disease and ESRD  Baseline hemoglobin: 9.6 - 11.8  Acute hypotension anemia on 1/13 requiring transfusion  Blood products:  1/13: 2 unit PRBC CMV negative, irradiated  1/14 1u pRBC    Plan:   Transfuse as indicated in the setting of hemodynamic instability or signs of active bleeding  Monitor Frequent Hg  Plan as above due to GIB    S/P AKA (above knee amputation), right (HCC)  Assessment & Plan  Secondary to chronic osteomyelitis     Plan:   PT/OT when appropriate   Offloading and frequent turns              Disposition: Critical care    ICU Core Measures     A: Assess, Prevent, and Manage Pain Has pain been assessed? Yes  Need for changes to pain regimen? No   B: Both SAT/SAT  N/A   C: Choice of Sedation RASS Goal: 0 Alert and Calm  Need for changes to sedation or analgesia regimen? No   D: Delirium CAM-ICU: Negative   E: Early Mobility  Plan for early mobility? Yes   F: Family Engagement Plan for family engagement today? Yes     Antibiotic Review: Patient on appropriate coverage based on culture data.     Review of Invasive Devices:      Central access plan: Patient has multiple central venous catheters.   Tere Plan:  Discontinue arterial line    Prophylaxis:  VTE Contraindicated secondary to: GI Bleed   Stress Ulcer  covered bypantoprazole (PROTONIX) 40 mg tablet [[id number]] (Long-Term Med), pantoprazole (PROTONIX) injection 40 mg [[id number]]         Significant 24hr Events     24hr events: Patient went for EGD and flex sig with ulcer identified and clipped.  Remained hemodynamically stable with stable hemoglobin.  Tolerated CRRT and even, Levophed weaned off.     Subjective   Review of Systems   Constitutional:  Negative for chills and fever.   HENT:  Negative for ear pain and sore throat.    Eyes:  Negative for pain and visual disturbance.   Respiratory:  Negative for cough and shortness of breath.    Cardiovascular:  Negative for chest pain and palpitations.   Gastrointestinal:  Negative for abdominal pain and vomiting.   Genitourinary:  Negative for dysuria and hematuria.   Musculoskeletal:  Negative for arthralgias and back pain.   Skin:  Negative for color change and rash.   Neurological:  Negative for seizures and syncope.   All other systems reviewed and are negative.     Objective                            Vitals I/O      Most Recent Min/Max in 24hrs   Temp 98.6 °F (37 °C) Temp  Min: 96.8 °F (36 °C)  Max: 98.8 °F (37.1 °C)   Pulse 84 Pulse  Min: 62  Max: 94   Resp (!) 32 Resp  Min: 13  Max: 39   BP 96/52 BP  Min: 71/39  Max: 125/60   O2 Sat 99 % SpO2  Min: 97 %  Max: 100 %      Intake/Output Summary (Last 24 hours) at 1/15/2024 0432  Last data filed at 1/15/2024 0300  Gross per 24 hour   Intake 2921.04 ml   Output 1368 ml   Net 1553.04 ml       Diet Ramirez/CHO Controlled; Diabetic FL Liquids    Invasive Monitoring           Physical Exam   Physical Exam  Vitals and nursing note reviewed.   Eyes:      Pupils: Pupils are equal, round, and reactive to light.   Skin:     General: Skin is warm.   HENT:      Head: Normocephalic.      Mouth/Throat:      Mouth: Mucous membranes are moist.   Neck:      Vascular: Central line  present.   Cardiovascular:      Rate and Rhythm: Normal rate and regular rhythm.   Musculoskeletal:      Right lower leg: No edema.      Left lower leg: No edema.      Comments: Right AKA   Abdominal:      Palpations: Abdomen is soft.   Constitutional:       Appearance: He is well-nourished.   Pulmonary:      Effort: Pulmonary effort is normal.   Neurological:      General: No focal deficit present.      Mental Status: He is alert and oriented to person, place and time. Mental status is at baseline.      Motor: Strength full and intact in all extremities.   Genitourinary/Anorectal:  Chowdary present.          Diagnostic Studies      EKG: NSR  Imaging:  I have personally reviewed pertinent reports.       Medications:  Scheduled PRN   amiodarone, 200 mg, TID With Meals  aspirin, 81 mg, QAM  atorvastatin, 80 mg, Daily With Dinner  calcitriol, 0.5 mcg, Once per day on Tuesday Thursday Saturday  cefepime, 1,000 mg, Q12H  cinacalcet, 30 mg, Daily  insulin glargine, 10 Units, Q12H JAMARCUS  insulin lispro, 1-6 Units, HS  insulin lispro, 1-6 Units, TID With Meals  insulin lispro, 8 Units, TID With Meals  pantoprazole, 40 mg, Q12H JAMARCUS  predniSONE, 5 mg, Daily  tacrolimus, 1 mg, QPM  tacrolimus, 2 mg, QAM      acetaminophen, 650 mg, Q6H PRN  albuterol, 2 puff, Q4H PRN  glycerin-hypromellose-, 1 drop, Q4H PRN  ondansetron, 4 mg, Q8H PRN       Continuous    amiodarone (CORDARONE) 900 mg in dextrose 5 % 500 mL infusion, 0.5 mg/min, Last Rate: 0.5 mg/min (01/14/24 1800)  norepinephrine, 1-30 mcg/min, Last Rate: Stopped (01/15/24 0330)  NxStage K 4/Ca 3, 20,000 mL         Labs:    CBC    Recent Labs     01/13/24  2316 01/13/24  2343 01/14/24  0338 01/14/24  1001 01/14/24  1534 01/14/24  2208   WBC 13.65*  --  11.80*  --   --   --    HGB 4.9*   < > 7.2*   < > 7.4* 7.9*   HCT 15.1*  --  22.0*  --   --   --      --  150  --   --   --     < > = values in this interval not displayed.     [facility]    Recent Labs     01/14/24  8554  01/14/24  2208   SODIUM 133* 133*   K 4.7 5.2    105   CO2 19* 19*   AGAP 6 9   BUN 24 22   CREATININE 2.25* 2.04*   CALCIUM 8.6 8.7       Coags    Recent Labs     01/13/24  1555 01/13/24  2238   PTT 47* 58*        Additional Electrolytes  Recent Labs     01/14/24  1534 01/14/24  2208   MG 1.9 2.1   PHOS 3.1 2.8   CAIONIZED 1.26 1.23          Blood Gas    Recent Labs     01/14/24  1534   PHART 7.353   [id number] 33.7*   PO2ART 119.0   NIC2JZL 18.3*   BEART -6.6   SOURCE Line, Arterial     Recent Labs     01/14/24  0449 01/14/24  1534   PHVEN 7.400  --    CXE0OEZ 35.2*  --    PO2VEN 23.6*  --    [id number] 21.3*  --    BEVEN -3.1  --    [id number] 41.0*  --    SOURCE  --  Line, Arterial    LFTs  No recent results    Infectious  No recent results  Glucose  Recent Labs     01/14/24  0336 01/14/24  1001 01/14/24  1534 01/14/24  2208   GLUC 209* 180* 133 205*               Andre Mendoza PA-C

## 2024-01-15 NOTE — ASSESSMENT & PLAN NOTE
Presented 23 to [facility] with new oxygen requirement, fatigue   Relevant imagin/7/24 CT Chest: Bilateral multilevel airspace consolidation, groundglass opacities, septal thickening and small bilateral pleural effusions secondary to volume overloaded as well as pneumonia.  Initial procalcitonin: 3.16 (24)  Possibly falsely elevated due to ESRD, but had lower levels in the past   Cultures:  24 BCXx2: No growth  23 COVID/Flu/RSV: Negative   23 RP2: Negative   24 MRSA Cx: Negative    24 BCXx2: No growth     Plan:   Current Antibiotics: Cefepime to complete coure   Continue to monitor fever and WBC curve   Follow up cultures

## 2024-01-16 PROBLEM — A41.9 SEPTIC SHOCK (HCC): Status: RESOLVED | Noted: 2024-01-08 | Resolved: 2024-01-16

## 2024-01-16 PROBLEM — R57.0 CARDIOGENIC SHOCK (HCC): Status: RESOLVED | Noted: 2024-01-11 | Resolved: 2024-01-16

## 2024-01-16 PROBLEM — R65.21 SEPTIC SHOCK (HCC): Status: RESOLVED | Noted: 2024-01-08 | Resolved: 2024-01-16

## 2024-01-16 PROBLEM — J96.01 ACUTE RESPIRATORY FAILURE WITH HYPOXIA (HCC): Status: RESOLVED | Noted: 2019-09-24 | Resolved: 2024-01-16

## 2024-01-16 LAB
ANION GAP SERPL CALCULATED.3IONS-SCNC: 5 MMOL/L
ANION GAP SERPL CALCULATED.3IONS-SCNC: 6 MMOL/L
ANION GAP SERPL CALCULATED.3IONS-SCNC: 6 MMOL/L
ANION GAP SERPL CALCULATED.3IONS-SCNC: 7 MMOL/L
BUN SERPL-MCNC: 15 MG/DL (ref 5–25)
BUN SERPL-MCNC: 16 MG/DL (ref 5–25)
BUN SERPL-MCNC: 16 MG/DL (ref 5–25)
BUN SERPL-MCNC: 17 MG/DL (ref 5–25)
CA-I BLD-SCNC: 1.15 MMOL/L (ref 1.12–1.32)
CA-I BLD-SCNC: 1.17 MMOL/L (ref 1.12–1.32)
CA-I BLD-SCNC: 1.21 MMOL/L (ref 1.12–1.32)
CA-I BLD-SCNC: 1.24 MMOL/L (ref 1.12–1.32)
CALCIUM SERPL-MCNC: 8.3 MG/DL (ref 8.4–10.2)
CALCIUM SERPL-MCNC: 8.4 MG/DL (ref 8.4–10.2)
CALCIUM SERPL-MCNC: 8.9 MG/DL (ref 8.4–10.2)
CALCIUM SERPL-MCNC: 9.1 MG/DL (ref 8.4–10.2)
CHLORIDE SERPL-SCNC: 101 MMOL/L (ref 96–108)
CHLORIDE SERPL-SCNC: 101 MMOL/L (ref 96–108)
CHLORIDE SERPL-SCNC: 103 MMOL/L (ref 96–108)
CHLORIDE SERPL-SCNC: 104 MMOL/L (ref 96–108)
CO2 SERPL-SCNC: 25 MMOL/L (ref 21–32)
CO2 SERPL-SCNC: 26 MMOL/L (ref 21–32)
CO2 SERPL-SCNC: 26 MMOL/L (ref 21–32)
CO2 SERPL-SCNC: 27 MMOL/L (ref 21–32)
CREAT SERPL-MCNC: 2.05 MG/DL (ref 0.6–1.3)
CREAT SERPL-MCNC: 2.06 MG/DL (ref 0.6–1.3)
CREAT SERPL-MCNC: 2.3 MG/DL (ref 0.6–1.3)
CREAT SERPL-MCNC: 2.31 MG/DL (ref 0.6–1.3)
ERYTHROCYTE [DISTWIDTH] IN BLOOD BY AUTOMATED COUNT: 21 % (ref 11.6–15.1)
GFR SERPL CREATININE-BSD FRML MDRD: 30 ML/MIN/1.73SQ M
GFR SERPL CREATININE-BSD FRML MDRD: 31 ML/MIN/1.73SQ M
GFR SERPL CREATININE-BSD FRML MDRD: 35 ML/MIN/1.73SQ M
GFR SERPL CREATININE-BSD FRML MDRD: 35 ML/MIN/1.73SQ M
GLUCOSE SERPL-MCNC: 107 MG/DL (ref 65–140)
GLUCOSE SERPL-MCNC: 117 MG/DL (ref 65–140)
GLUCOSE SERPL-MCNC: 117 MG/DL (ref 65–140)
GLUCOSE SERPL-MCNC: 155 MG/DL (ref 65–140)
GLUCOSE SERPL-MCNC: 166 MG/DL (ref 65–140)
GLUCOSE SERPL-MCNC: 58 MG/DL (ref 65–140)
GLUCOSE SERPL-MCNC: 71 MG/DL (ref 65–140)
GLUCOSE SERPL-MCNC: 73 MG/DL (ref 65–140)
GLUCOSE SERPL-MCNC: 79 MG/DL (ref 65–140)
GLUCOSE SERPL-MCNC: <20 MG/DL (ref 65–140)
HCT VFR BLD AUTO: 24 % (ref 36.5–49.3)
HGB BLD-MCNC: 8 G/DL (ref 12–17)
HGB BLD-MCNC: 8.1 G/DL (ref 12–17)
HGB BLD-MCNC: 8.2 G/DL (ref 12–17)
MAGNESIUM SERPL-MCNC: 1.9 MG/DL (ref 1.9–2.7)
MAGNESIUM SERPL-MCNC: 2 MG/DL (ref 1.9–2.7)
MAGNESIUM SERPL-MCNC: 2.2 MG/DL (ref 1.9–2.7)
MAGNESIUM SERPL-MCNC: 2.3 MG/DL (ref 1.9–2.7)
MCH RBC QN AUTO: 33.3 PG (ref 26.8–34.3)
MCHC RBC AUTO-ENTMCNC: 33.8 G/DL (ref 31.4–37.4)
MCV RBC AUTO: 99 FL (ref 82–98)
PHOSPHATE SERPL-MCNC: 2.3 MG/DL (ref 2.7–4.5)
PHOSPHATE SERPL-MCNC: 2.5 MG/DL (ref 2.7–4.5)
PHOSPHATE SERPL-MCNC: 2.7 MG/DL (ref 2.7–4.5)
PHOSPHATE SERPL-MCNC: 3 MG/DL (ref 2.7–4.5)
PLATELET # BLD AUTO: 129 THOUSANDS/UL (ref 149–390)
PMV BLD AUTO: 10.7 FL (ref 8.9–12.7)
POTASSIUM SERPL-SCNC: 4.5 MMOL/L (ref 3.5–5.3)
POTASSIUM SERPL-SCNC: 4.6 MMOL/L (ref 3.5–5.3)
POTASSIUM SERPL-SCNC: 4.7 MMOL/L (ref 3.5–5.3)
POTASSIUM SERPL-SCNC: 4.9 MMOL/L (ref 3.5–5.3)
RBC # BLD AUTO: 2.43 MILLION/UL (ref 3.88–5.62)
SODIUM SERPL-SCNC: 133 MMOL/L (ref 135–147)
SODIUM SERPL-SCNC: 133 MMOL/L (ref 135–147)
SODIUM SERPL-SCNC: 135 MMOL/L (ref 135–147)
SODIUM SERPL-SCNC: 136 MMOL/L (ref 135–147)
WBC # BLD AUTO: 15.76 THOUSAND/UL (ref 4.31–10.16)

## 2024-01-16 PROCEDURE — 82330 ASSAY OF CALCIUM: CPT | Performed by: PHYSICIAN ASSISTANT

## 2024-01-16 PROCEDURE — 85027 COMPLETE CBC AUTOMATED: CPT | Performed by: PHYSICIAN ASSISTANT

## 2024-01-16 PROCEDURE — 90945 DIALYSIS ONE EVALUATION: CPT

## 2024-01-16 PROCEDURE — C9113 INJ PANTOPRAZOLE SODIUM, VIA: HCPCS | Performed by: PHYSICIAN ASSISTANT

## 2024-01-16 PROCEDURE — 83735 ASSAY OF MAGNESIUM: CPT | Performed by: PHYSICIAN ASSISTANT

## 2024-01-16 PROCEDURE — 99291 CRITICAL CARE FIRST HOUR: CPT | Performed by: ANESTHESIOLOGY

## 2024-01-16 PROCEDURE — 82948 REAGENT STRIP/BLOOD GLUCOSE: CPT

## 2024-01-16 PROCEDURE — 84100 ASSAY OF PHOSPHORUS: CPT | Performed by: PHYSICIAN ASSISTANT

## 2024-01-16 PROCEDURE — 99223 1ST HOSP IP/OBS HIGH 75: CPT | Performed by: INTERNAL MEDICINE

## 2024-01-16 PROCEDURE — 85018 HEMOGLOBIN: CPT | Performed by: PHYSICIAN ASSISTANT

## 2024-01-16 PROCEDURE — 99232 SBSQ HOSP IP/OBS MODERATE 35: CPT | Performed by: INTERNAL MEDICINE

## 2024-01-16 PROCEDURE — 90945 DIALYSIS ONE EVALUATION: CPT | Performed by: INTERNAL MEDICINE

## 2024-01-16 PROCEDURE — 5A1D90Z PERFORMANCE OF URINARY FILTRATION, CONTINUOUS, GREATER THAN 18 HOURS PER DAY: ICD-10-PCS | Performed by: INTERNAL MEDICINE

## 2024-01-16 PROCEDURE — 80048 BASIC METABOLIC PNL TOTAL CA: CPT | Performed by: PHYSICIAN ASSISTANT

## 2024-01-16 RX ORDER — CALCIUM GLUCONATE 20 MG/ML
1 INJECTION, SOLUTION INTRAVENOUS ONCE
Status: COMPLETED | OUTPATIENT
Start: 2024-01-16 | End: 2024-01-17

## 2024-01-16 RX ORDER — INSULIN GLARGINE 100 [IU]/ML
6 INJECTION, SOLUTION SUBCUTANEOUS EVERY 12 HOURS SCHEDULED
Status: DISCONTINUED | OUTPATIENT
Start: 2024-01-16 | End: 2024-01-20 | Stop reason: HOSPADM

## 2024-01-16 RX ORDER — ALBUMIN, HUMAN INJ 5% 5 %
12.5 SOLUTION INTRAVENOUS ONCE
Status: COMPLETED | OUTPATIENT
Start: 2024-01-16 | End: 2024-01-16

## 2024-01-16 RX ORDER — MAGNESIUM SULFATE 1 G/100ML
1 INJECTION INTRAVENOUS ONCE
Status: COMPLETED | OUTPATIENT
Start: 2024-01-16 | End: 2024-01-16

## 2024-01-16 RX ORDER — LANOLIN ALCOHOL/MO/W.PET/CERES
3 CREAM (GRAM) TOPICAL
Status: DISCONTINUED | OUTPATIENT
Start: 2024-01-16 | End: 2024-01-20 | Stop reason: HOSPADM

## 2024-01-16 RX ORDER — CALCIUM GLUCONATE 20 MG/ML
1 INJECTION, SOLUTION INTRAVENOUS ONCE
Status: COMPLETED | OUTPATIENT
Start: 2024-01-16 | End: 2024-01-16

## 2024-01-16 RX ADMIN — PREDNISONE 5 MG: 5 TABLET ORAL at 08:00

## 2024-01-16 RX ADMIN — ATORVASTATIN CALCIUM 80 MG: 80 TABLET, FILM COATED ORAL at 17:04

## 2024-01-16 RX ADMIN — ALBUMIN (HUMAN) 12.5 G: 12.5 INJECTION, SOLUTION INTRAVENOUS at 21:42

## 2024-01-16 RX ADMIN — CALCIUM GLUCONATE 1 G: 20 INJECTION, SOLUTION INTRAVENOUS at 11:12

## 2024-01-16 RX ADMIN — Medication 20000 ML: at 14:35

## 2024-01-16 RX ADMIN — PANTOPRAZOLE SODIUM 40 MG: 40 INJECTION, POWDER, FOR SOLUTION INTRAVENOUS at 17:10

## 2024-01-16 RX ADMIN — Medication 20000 ML: at 03:51

## 2024-01-16 RX ADMIN — TACROLIMUS 1 MG: 1 CAPSULE ORAL at 17:04

## 2024-01-16 RX ADMIN — AMIODARONE HYDROCHLORIDE 200 MG: 200 TABLET ORAL at 17:04

## 2024-01-16 RX ADMIN — MAGNESIUM SULFATE HEPTAHYDRATE 1 G: 1 INJECTION, SOLUTION INTRAVENOUS at 11:12

## 2024-01-16 RX ADMIN — PANTOPRAZOLE SODIUM 40 MG: 40 INJECTION, POWDER, FOR SOLUTION INTRAVENOUS at 06:09

## 2024-01-16 RX ADMIN — CINACALCET 30 MG: 30 TABLET, FILM COATED ORAL at 08:00

## 2024-01-16 RX ADMIN — CALCITRIOL 0.5 MCG: 0.25 CAPSULE, LIQUID FILLED ORAL at 08:00

## 2024-01-16 RX ADMIN — CLOPIDOGREL BISULFATE 75 MG: 75 TABLET ORAL at 08:00

## 2024-01-16 RX ADMIN — Medication 3 MG: at 22:59

## 2024-01-16 RX ADMIN — INSULIN GLARGINE 6 UNITS: 100 INJECTION, SOLUTION SUBCUTANEOUS at 08:00

## 2024-01-16 RX ADMIN — TACROLIMUS 2 MG: 1 CAPSULE ORAL at 08:00

## 2024-01-16 RX ADMIN — SODIUM PHOSPHATE, MONOBASIC, MONOHYDRATE AND SODIUM PHOSPHATE, DIBASIC, ANHYDROUS 6 MMOL: 142; 276 INJECTION, SOLUTION INTRAVENOUS at 05:57

## 2024-01-16 RX ADMIN — ONDANSETRON 4 MG: 2 INJECTION INTRAMUSCULAR; INTRAVENOUS at 13:30

## 2024-01-16 RX ADMIN — AMIODARONE HYDROCHLORIDE 200 MG: 200 TABLET ORAL at 08:00

## 2024-01-16 RX ADMIN — CALCIUM GLUCONATE 1 G: 20 INJECTION, SOLUTION INTRAVENOUS at 23:13

## 2024-01-16 RX ADMIN — SODIUM PHOSPHATE, MONOBASIC, MONOHYDRATE AND SODIUM PHOSPHATE, DIBASIC, ANHYDROUS 6 MMOL: 142; 276 INJECTION, SOLUTION INTRAVENOUS at 17:04

## 2024-01-16 RX ADMIN — INSULIN LISPRO 1 UNITS: 100 INJECTION, SOLUTION INTRAVENOUS; SUBCUTANEOUS at 11:06

## 2024-01-16 RX ADMIN — ASPIRIN 81 MG CHEWABLE TABLET 81 MG: 81 TABLET CHEWABLE at 08:00

## 2024-01-16 RX ADMIN — AMIODARONE HYDROCHLORIDE 200 MG: 200 TABLET ORAL at 11:06

## 2024-01-16 NOTE — PROGRESS NOTES
[facility]  Progress Note  Name: Berto Faria I  MRN: [id number]  Unit/Bed#: [facility] 515-01 I Date of Admission: 1/8/2024   Date of Service: 1/16/2024 I Hospital Day: 8    Assessment/Plan   NSTEMI (non-ST elevated myocardial infarction) (HCC)  Assessment & Plan  Presented to [facility] ED with shortness of breath. Troponin and EKG checked as part of initial workup. EKG without acute ischemic changes   NSTEMI likely related to increased demand from acute hypoxic respiratory failure and septic shock   Troponin 36898 > 33104 > 54637   Cardiac Cath 1/12:Severe in-stent restenosis bifurcation ostial LAD/LCx,  RCA, elevated LVEDP, porcelain aorta.  CT Surgery Consulted-recommendations for supportive care or high risk PCI    Plan:  ASA 81mg daily, Plavix 75 Daily   Atorvastatin 80mg qHS   Continue holding home afterload reduction agents/AV aubrie blockers given boarderline Blood Pressure    Carvedilol 6.25mg BID  Isosorbide mononitrate 30mg q24h   Metoprolol succinate 50mg q24  Nifedipine 30mg q24 hours   Cardiology consulted, appreciate recommendations- High risk PCI tentatively planned for 1/18    GI bleed w/ Hemorrhagic Shock  Assessment & Plan  Acute blood loss anemia  with hemorrhagic shock present on 1/13 with large melanotic stools.  Patient does have history of GI bleed and previous hospitalization, required intervention in 2019 at [facility]  Received 3 units pRBC  1/14 EGD/flex sig: Significant amount of blood in the descending colon, sigmoid colon, and rectum.  No ischemic changes identified.  Single cratered ulcer in the first part of the duodenum with visible vessel, clips applied.  Plan:  Continue Protonix 40 twice daily  Continue to monitor frequent hemoglobin  Continue monitor hemodynamics, consider transfusion/hemoglobin recheck with any decline  GI following    New onset a-fib (HCC)  Assessment & Plan  New-onset in AM of 1/11. With occasional RVR with associated  hypotension. Amiodarone bolus x1 given at RRT while in dialysis  AM.    Plan:  Currently NSR  Amiodarone 200 TID  Consider AC Plan    Acute on chronic diastolic (congestive) heart failure (HCC)  Assessment & Plan  Likely related to NSTEMI from septic shock and element of stress cardiomyopathy with  possible ischemic element given regional wall changes   24 TTE: LV cavity size is normal. Wall thickness is mildly increased. LVEF 40%. Systolic function is moderately reduced. Following segments are hypokinetic: mid anteroseptal, mid inferior, apical anterior, apical septal, apical inferior and apex.RV normal. Mild MR. Mild TR    Plan:   Volume removal with CRRT  Daily weights  Continue holding home afterload reduction agents/AV aubrie blockers given resolving septic/ cardiogenic/ hemorrhagic  shock:   Carvedilol 6.25mg BID  Isosorbide mononitrate 30mg q24h   Metoprolol succinate 50mg q24  Nifedipine 30mg q24 hours   Meds as above for NSTEMI  Cardiology consulted, appreciate recommendations      Multifocal pneumonia  Assessment & Plan  Presented 23 to [facility] with new oxygen requirement, fatigue   Relevant imagin/7/24 CT Chest: Bilateral multilevel airspace consolidation, groundglass opacities, septal thickening and small bilateral pleural effusions secondary to volume overloaded as well as pneumonia.  Initial procalcitonin: 3.16 (24)  Possibly falsely elevated due to ESRD, but had lower levels in the past   Cultures:  24 BCXx2: No growth  23 COVID/Flu/RSV: Negative   23 RP2: Negative   24 MRSA Cx: Negative    24 BCXx2: No growth     Plan:   Completed Cefepime Course   Continue to monitor fever and WBC curve       ESRD (end stage renal disease) (HCC)  Assessment & Plan  Dialysis regimen: Tuesday, Thursday, Saturday via right arm AV fistula   24: Right IJ temporary HD catheter placement   24: CRRT initiated   24: Tolerated -100 to -150 cc/hr volume removal   Re-initiation  of CRRT 1/11    Plan:   Continue CRRT-has been off Levophed for approximately 24 hours plan for eventual transition back to IHD ( 24-48hrs)  Cinacalcet 30mg daily   Nephrology consulted, appreciate recommendations    Type 1 diabetes mellitus on insulin therapy (HCC)  Assessment & Plan  Lab Results   Component Value Date    HGBA1C 7.7 (H) 01/07/2024    HGBA1C 6.5 (H) 02/23/2018   Anticipate hyperglycemia associated with higher steroid dose    Plan:   Continue AC/HS sliding scale algorithm   Lispro 8 units TID with meals  Lantus 10 units q12h   Adjust insulin regimen as needed to maintain goal -180  Carb controlled diet   Patient intermittently non-complaint with Insulin     Renal transplant failure and rejection  Assessment & Plan  History of renal transplant chronically on tacrolimus 1 mg p.o. nightly, 2 mg p.o. every morning, AZA 50 daily, Prednisone 5 Daily    Plan:  Continue tacrolimus 2mg qAM, 1mg qPM  Prednisone 5 mg qD  Holding home azathioprine 50mg daily   Nephrology consulted, appreciate recommendations   Reach out to transplant center for update regarding these medications    Acute on chronic anemia  Assessment & Plan  Secondary to chronic disease and ESRD  Baseline hemoglobin: 9.6 - 11.8  Acute hypotension anemia on 1/13 requiring transfusion  Blood products:  1/13: 2 unit PRBC CMV negative, irradiated  1/14 1u pRBC    Plan:   Transfuse as indicated in the setting of hemodynamic instability or signs of active bleeding  Monitor Frequent Hg  Plan as above due to GIB    S/P AKA (above knee amputation), right (HCC)  Assessment & Plan  Secondary to chronic osteomyelitis     Plan:   PT/OT when appropriate   Offloading and frequent turns              Disposition: Critical care    ICU Core Measures     A: Assess, Prevent, and Manage Pain Has pain been assessed? Yes  Need for changes to pain regimen? No   B: Both SAT/SAT  N/A   C: Choice of Sedation RASS Goal: 0 Alert and Calm  Need for changes to sedation  or analgesia regimen? No   D: Delirium CAM-ICU: Negative   E: Early Mobility  Plan for early mobility? Yes   F: Family Engagement Plan for family engagement today? Yes       Review of Invasive Devices:      Central access plan: Patient has multiple central venous catheters.       Prophylaxis:  VTE Contraindicated secondary to: GI Bleed   Stress Ulcer  covered bypantoprazole (PROTONIX) 40 mg tablet [[id number]] (Long-Term Med), pantoprazole (PROTONIX) injection 40 mg [[id number]]         Significant 24hr Events     24hr events: Remains off Levophed throughout the day and evening.  Tolerating CRRT at -25.  Initiated on DAPT in the setting of NSTEMI.  Plan for high risk PCI possibly on Thursday.     Subjective   Review of Systems   Constitutional:  Negative for activity change, appetite change, chills, fever and unexpected weight change.   HENT:  Negative for congestion, ear discharge, postnasal drip, rhinorrhea, sinus pressure, sinus pain and sore throat.    Respiratory:  Negative for apnea, cough, choking, chest tightness, shortness of breath, wheezing and stridor.    Cardiovascular:  Negative for chest pain, palpitations and leg swelling.   Gastrointestinal:  Negative for abdominal pain, blood in stool, constipation, diarrhea, nausea and vomiting.   Genitourinary:  Negative for dysuria, flank pain, frequency and urgency.   Musculoskeletal:  Negative for arthralgias, myalgias and neck stiffness.   Skin:  Negative for color change, pallor, rash and wound.   Neurological:  Negative for dizziness, tremors, seizures, syncope, light-headedness, numbness and headaches.   All other systems reviewed and are negative.     Objective                            Vitals I/O      Most Recent Min/Max in 24hrs   Temp 99.1 °F (37.3 °C) Temp  Min: 98.2 °F (36.8 °C)  Max: 99.3 °F (37.4 °C)   Pulse 80 Pulse  Min: 64  Max: 82   Resp 22 Resp  Min: 19  Max: 33   BP 98/50 BP  Min: 78/45  Max: 115/55   O2 Sat 97 % SpO2  Min: 95 %  Max: 100 %       Intake/Output Summary (Last 24 hours) at 1/16/2024 0421  Last data filed at 1/16/2024 0400  Gross per 24 hour   Intake 1080 ml   Output 1584 ml   Net -504 ml       Diet Surgical; Surgical Soft/Lite Meal; Consistent Carbohydrate Diet Level 3 (6 carb servings/90 grams CHO/meal)    Invasive Monitoring             Physical Exam   Physical Exam  Vitals and nursing note reviewed.   Eyes:      Extraocular Movements: Extraocular movements intact.      Pupils: Pupils are equal, round, and reactive to light.   Skin:     General: Skin is warm.   HENT:      Mouth/Throat:      Mouth: Mucous membranes are moist.   Cardiovascular:      Rate and Rhythm: Normal rate and regular rhythm.   Musculoskeletal:         General: Normal range of motion.      Right lower leg: No edema.      Left lower leg: No edema.   Abdominal:      Palpations: Abdomen is soft.   Constitutional:       Appearance: He is well-developed.   Pulmonary:      Effort: Pulmonary effort is normal.      Breath sounds: Normal breath sounds.   Neurological:      General: No focal deficit present.      Mental Status: He is alert and oriented to person, place and time. Mental status is at baseline.            Diagnostic Studies      EKG: NSR  Imaging:  I have personally reviewed pertinent reports.       Medications:  Scheduled PRN   amiodarone, 200 mg, TID With Meals  aspirin, 81 mg, QAM  atorvastatin, 80 mg, Daily With Dinner  calcitriol, 0.5 mcg, Once per day on Tuesday Thursday Saturday  cinacalcet, 30 mg, Daily  clopidogrel, 75 mg, Daily  insulin glargine, 10 Units, Q12H JAMARCUS  insulin lispro, 1-6 Units, HS  insulin lispro, 1-6 Units, TID With Meals  insulin lispro, 8 Units, TID With Meals  pantoprazole, 40 mg, Q12H JAMARCUS  predniSONE, 5 mg, Daily  tacrolimus, 1 mg, QPM  tacrolimus, 2 mg, QAM      acetaminophen, 650 mg, Q6H PRN  albuterol, 2 puff, Q4H PRN  glycerin-hypromellose-, 1 drop, Q4H PRN  ondansetron, 4 mg, Q8H PRN       Continuous    norepinephrine, 1-30  mcg/min, Last Rate: Stopped (01/15/24 1001)  NxStage K 4/Ca 3, 20,000 mL         Labs:    CBC    Recent Labs     01/15/24  0425 01/15/24  0958 01/15/24  2155 01/16/24  0419   WBC 16.79*  17.31*  --   --  15.76*   HGB 7.9*  8.1*   < > 8.2* 8.1*   HCT 23.2*  23.1*  --   --  24.0*   *  --   --  129*    < > = values in this interval not displayed.     BMP    Recent Labs     01/15/24  1603 01/15/24  2155   SODIUM 131* 133*   K 5.0 4.7    102   CO2 23 25   AGAP 6 6   BUN 19 18   CREATININE 1.91* 2.05*   CALCIUM 8.4 8.7       Coags    No recent results     Additional Electrolytes  Recent Labs     01/15/24  1603 01/15/24  2155 01/16/24  0406   MG 2.5 2.2  --    PHOS 3.0 2.6*  --    CAIONIZED 1.16 1.17 1.24          Blood Gas    Recent Labs     01/14/24  1534   PHART 7.353   [id number] 33.7*   PO2ART 119.0   WQF9ULG 18.3*   BEART -6.6   SOURCE Line, Arterial     Recent Labs     01/14/24  0449 01/14/24  1534   PHVEN 7.400  --    DEZ8ZOJ 35.2*  --    PO2VEN 23.6*  --    [id number] 21.3*  --    BEVEN -3.1  --    [id number] 41.0*  --    SOURCE  --  Line, Arterial    LFTs  No recent results    Infectious  No recent results  Glucose  Recent Labs     01/15/24  0425 01/15/24  0958 01/15/24  1603 01/15/24  2155   GLUC 148* 122 233* 109                   Andre Mendoza PA-C

## 2024-01-16 NOTE — ASSESSMENT & PLAN NOTE
As evidenced by low ScvO2, cool extremities, and newly depressed/worsening EF on STAT echo 01/11.   Now resolved     01/11 Echo:  •  Left Ventricle: Left ventricular cavity size is mildly dilated. Wall thickness is normal. There is eccentric hypertrophy. The left ventricular ejection fraction is 32%. Systolic function is severely reduced. There is severe global hypokinesis with specific regional wall abnormalities in the anteroseptal, inferior and apical regions. Diastolic function is abnormal.  •  Left Atrium: The atrium is moderately dilated.  •  Mitral Valve: There is mild to moderate regurgitation.  •  Tricuspid Valve: There is mild to moderate regurgitation. The right ventricular systolic pressure is moderately elevated. The estimated right ventricular systolic pressure is 57.00 mmHg.    Plan:  Weaned off Milrinone 1/14  Monitor endpoints of resuscitation  Continuous cardiopulmonary monitoring  Continue CRRT for volume management

## 2024-01-16 NOTE — ASSESSMENT & PLAN NOTE
Dialysis regimen: Tuesday, Thursday, Saturday via right arm AV fistula   1/8/24: Right IJ temporary HD catheter placement   1/8/24: CRRT initiated   1/9/24: Tolerated -100 to -150 cc/hr volume removal   Re-initiation of CRRT 1/11    Plan:   Continue CRRT-has been off Levophed for approximately 24 hours plan for eventual transition back to IHD ( 24-48hrs)  Cinacalcet 30mg daily   Nephrology consulted, appreciate recommendations

## 2024-01-16 NOTE — PHYSICAL THERAPY NOTE
PT Treatment       01/16/24 0930   PT Last Visit   PT Visit Date 01/16/24   Note Type   Note Type Cancelled Session   Cancel Reasons Refusal  (Patient politely declined reporting he was not in the right mental state for therapy today. PT did assist RN with standing patient while he was transferring from commode to chair. PT will continue to follow)       TACO ARGUETA PT, DPT

## 2024-01-16 NOTE — PROGRESS NOTES
Cardiology Team 2 Progress Note - Berto Faria 53 y.o. male MRN: [id number]  Unit/Bed#: [facility] 515-01 Encounter: [id number]      Subjective:   Patient seen and examined. No acute complaints and no events overnight. Tolerating CRRT off pressor support. Was started on DAPT, Hgb stable. CHIP scheduled for Thursday.       Assessment:  Berto Faria is a 53 y.o. male with a PMH of HFrEF, ESRD, DM1, CAD w/ prior PEA arrest, Hx of R AKA, Afib who presents as an xfer from [facility] for continued management of NSTEMI and cardiogenic and septic shock. S/p LHC showing stent stenosis of Lcx + LAD stents, RCA . Acute drop of Hb to 4.9, s/p 3u pRBC w/ adequate response, due to duodenal bleeding now s/p clip x1 with ovesco clip w/ hemostasis.     Active Problems:    Type 1 diabetes mellitus on insulin therapy ([facility])    NSTEMI (non-ST elevated myocardial infarction) ([facility])    Acidosis    GI bleed w/ Hemorrhagic Shock    S/P AKA (above knee amputation), right ([facility])    Acute on chronic anemia    ESRD (end stage renal disease) ([facility])    Multifocal pneumonia    Acute on chronic diastolic (congestive) heart failure ([facility])    Renal transplant failure and rejection    New onset a-fib ([facility])      Assessment:  1.Shock - septic/cardiogenic/hemorrhagic ; now resolved     - Presented as a transfer from  [facility] as NSTEMI, developed cardio- and distributive- shock  -Status: SCAI stage B-C, warm without evidence of endorgan damage  - TTE(1/11/24): LVIDD 5 cm, eccentric hypertrophy, LVEF 32%, severe global hypokinesis with wall motion abnormalities in anteroseptal, inferior and apical walls.  Mild to moderate MR, mild to moderate TR with eRVSP of 57.   - LHC(1/12/24): Right femoral access- Ostial LAD 95%, ostial circumflex to proximal circumflex 75%, proximal %, mid LAD 60%, distal left main 50%, first marginal 100%.  Severe in-stent restenosis at the bifurcation of ostial LAD/left circumflex; moderately elevated LVEDP  - CABG turn down  "  - VAS limited iliac-femoral eval for IMPELLA:    - Right ilio-femoral: 0.4-0.7 cm    - Left ilio-femoral: 0.4-0.7 cm   - distal abdominal aorta- 1.1 cm     2.Paroxysmal atrial fibrillation  -   New diagnosis of A-fib, on 1/11, occasional RVR with associated hypotension.  -Currently on Amio drip in normal sinus rhythm    3.Acute HFrEF-   -In setting of NSTEMI and septic shock superimposed on ischemic cardiomyopathy  -Preload: On CRRT  -Afterload: Was taken off pressor support today, will monitor for 24 hours prior to resuming home meds   -Home medication: Coreg 6.25 mg twice daily, isosorbide mononitrate 30 mg daily, metoprolol succinate 50 mg daily, nifedipine 30 mg daily    4.Multifocal pneumonia-resolved  5.Cardiomyopathy - ischemic - EF 30%  6.Acute respiratory failure with hypoxia  7.Type 2 myocardial infarction secondary to #2/4  8.ESRD on HD - current on CRRT  9.Renal transplant failure/rejection   10.Right AKA secondary to osteomyelitis   11.Type 1 diabetes mellitus  12.duodenal ulcer-now status post x 1 ovesco clip(1/13/2024)      Plan:  CHIP tentatively scheduled for Thursday, 1/18/2024 with Dr. Bloom; likely IMPELLA assisted   Tolerating DAPT with ASA and Plavix   Consider resumption of Heparin GGT on Wednesday  CRRT as per nephrology, would greatly appreciate it if patient can be optimized for cath on Thursday.     Case discussed and reviewed with Dr. Murray who agrees with my assessment and plan.Thank you for involving us in the care of your patient.    Rodger Crespo MD  Cardiology Fellow   PGY-5      Vitals: Blood pressure 104/55, pulse 83, temperature 99.1 °F (37.3 °C), temperature source Rectal, resp. rate 19, height 5' 4\" (1.626 m), weight 49 kg (108 lb 0.4 oz), SpO2 98%., Body mass index is 18.54 kg/m².,   Orthostatic Blood Pressures      Flowsheet Row Most Recent Value   Blood Pressure 104/55 filed at 01/16/2024 0800   Patient Position - Orthostatic VS Lying filed at 01/16/2024 0015      "         Intake/Output Summary (Last 24 hours) at 1/16/2024 0856  Last data filed at 1/16/2024 0800  Gross per 24 hour   Intake 995 ml   Output 1693 ml   Net -698 ml       Review of System:  Review of system was conducted and was negative except for as stated in the subjective course.    Physical Exam:    GEN: Berto Faria appears well, alert and oriented x 3, pleasant and cooperative   NECK: No JVD or carotid bruits; right HD catheter  HEART: Regular rhythm, normal rate, normal S1 and S2, no murmurs, clicks, gallops or rubs   LUNGS: Clear to auscultation bilaterally; no wheezes, rales, or rhonchi; respiration nonlabored   ABDOMEN:  Normoactive bowel sounds, soft, no tenderness, no distention  EXTREMITIES: Right AKA        Current Facility-Administered Medications:     acetaminophen (TYLENOL) tablet 650 mg, 650 mg, Oral, Q6H PRN, Rosalva Taylor PA-C, 650 mg at 01/15/24 1403    albuterol (PROVENTIL HFA,VENTOLIN HFA) inhaler 2 puff, 2 puff, Inhalation, Q4H PRN, Katie Trottercstrae, CRNP    amiodarone tablet 200 mg, 200 mg, Oral, TID With Meals, Katie Rose Mariecsek, CRNP, 200 mg at 01/16/24 0800    aspirin chewable tablet 81 mg, 81 mg, Oral, QAM, Katie Rose Mariecsek, CRNP, 81 mg at 01/16/24 0800    atorvastatin (LIPITOR) tablet 80 mg, 80 mg, Oral, Daily With Dinner, Katie Rose Mariecsek, CRNP, 80 mg at 01/15/24 1702    calcitriol (ROCALTROL) capsule 0.5 mcg, 0.5 mcg, Oral, Once per day on Tuesday Thursday Saturday, Katie Rose Mariecstrae, CRNP, 0.5 mcg at 01/16/24 0800    cinacalcet (SENSIPAR) tablet 30 mg, 30 mg, Oral, Daily, Katie Rose Mariecstrae, CRNP, 30 mg at 01/16/24 0800    clopidogrel (PLAVIX) tablet 75 mg, 75 mg, Oral, Daily, Carlie Chan PA-C, 75 mg at 01/16/24 0800    glycerin-hypromellose- (ARTIFICIAL TEARS) ophthalmic solution 1 drop, 1 drop, Right Eye, Q4H PRN, GAVINO Baker    insulin glargine (LANTUS) subcutaneous injection 6 Units 0.06 mL, 6 Units, Subcutaneous, Q12H JAMARCUS, Carlie Chan PA-C, 6 Units at  01/16/24 0800    insulin lispro (HumaLOG) 100 units/mL subcutaneous injection 1-6 Units, 1-6 Units, Subcutaneous, HS, Katie Velasco CRNP, 2 Units at 01/14/24 0216    insulin lispro (HumaLOG) 100 units/mL subcutaneous injection 1-6 Units, 1-6 Units, Subcutaneous, TID With Meals, 1 Units at 01/15/24 1728 **AND** Fingerstick Glucose (POCT), , , TID AC, BERLIN BakerNP    NxStage K 4/Ca 3 dialysis solution (RFP-401) 20,000 mL, 20,000 mL, Dialysis, Continuous, Carlie Chan PA-C, 20,000 mL at 01/16/24 0351    ondansetron (ZOFRAN) injection 4 mg, 4 mg, Intravenous, Q8H PRN, BERLIN BakerNP, 4 mg at 01/14/24 0707    pantoprazole (PROTONIX) injection 40 mg, 40 mg, Intravenous, Q12H JAMARCUS, Andre Mendoza PA-C, 40 mg at 01/16/24 0609    predniSONE tablet 5 mg, 5 mg, Oral, Daily, Katie Rose Mariecsek, CRNP, 5 mg at 01/16/24 0800    tacrolimus (PROGRAF) capsule 1 mg, 1 mg, Oral, QPM, Katie Rose Mariecsek, CRNP, 1 mg at 01/15/24 1702    tacrolimus (PROGRAF) capsule 2 mg, 2 mg, Oral, QAM, Katie Rose Mariecsek, CRNP, 2 mg at 01/16/24 0800    Labs & Results:  Results from last 7 days   Lab Units 01/11/24  2229 01/11/24  2035 01/11/24  1840   HS TNI 0HR ng/L  --   --  11,399*   HS TNI 2HR ng/L  --  10,340*  --    HS TNI 4HR ng/L 9,884*  --   --          Results from last 7 days   Lab Units 01/16/24  0406 01/15/24  2155 01/15/24  1603   POTASSIUM mmol/L 4.5 4.7 5.0   CO2 mmol/L 25 25 23   CHLORIDE mmol/L 103 102 102   BUN mg/dL 17 18 19   CREATININE mg/dL 2.05* 2.05* 1.91*     Results from last 7 days   Lab Units 01/16/24  0419 01/15/24  2155 01/15/24  1603 01/15/24  0958 01/15/24  0425 01/14/24  1001 01/14/24  0338   HEMOGLOBIN g/dL 8.1* 8.2* 8.3*   < > 7.9*  8.1*   < > 7.2*   HEMATOCRIT % 24.0*  --   --   --  23.2*  23.1*  --  22.0*   PLATELETS Thousands/uL 129*  --   --   --  131*  --  150    < > = values in this interval not displayed.           Telemetry:   Personally reviewed by Rodger Crespo MD: THU

## 2024-01-16 NOTE — ASSESSMENT & PLAN NOTE
New-onset in AM of 1/11. With occasional RVR with associated hypotension. Amiodarone bolus x1 given at RRT while in dialysis 1/11 AM.    Plan:  Currently NSR  Amiodarone 200 TID  Consider AC Plan

## 2024-01-16 NOTE — ASSESSMENT & PLAN NOTE
Presented 23 to [facility] with new oxygen requirement, fatigue   Relevant imagin/7/24 CT Chest: Bilateral multilevel airspace consolidation, groundglass opacities, septal thickening and small bilateral pleural effusions secondary to volume overloaded as well as pneumonia.  Initial procalcitonin: 3.16 (24)  Possibly falsely elevated due to ESRD, but had lower levels in the past   Cultures:  24 BCXx2: No growth  23 COVID/Flu/RSV: Negative   23 RP2: Negative   24 MRSA Cx: Negative    24 BCXx2: No growth     Plan:   Completed Cefepime Course   Continue to monitor fever and WBC curve

## 2024-01-16 NOTE — ASSESSMENT & PLAN NOTE
Acute blood loss anemia  with hemorrhagic shock present on 1/13 with large melanotic stools.  Patient does have history of GI bleed and previous hospitalization, [facility] intervention in [facility]  Received 3 units pRBC  1/14 EGD/flex sig: Significant amount of blood in the descending colon, sigmoid colon, and rectum.  No ischemic changes identified.  Single cratered ulcer in the first part of the duodenum with visible vessel, clips applied.  Plan:  Continue Protonix 40 twice daily  Continue to monitor frequent hemoglobin  Continue monitor hemodynamics, consider transfusion/hemoglobin recheck with any decline  GI following

## 2024-01-16 NOTE — PROGRESS NOTES
NEPHROLOGY PROGRESS NOTE   Berto Faria 53 y.o. male MRN: [id number]  Unit/Bed#: [facility] 515-01 Encounter: [id number]      ASSESSMENT & PLAN:  #1 ESRD on HD TTS at [facility].  Patient was transitioned to CRRT due to hypotension in the setting of rapid A-fib.  Patient seen and examined this morning on CRRT, remains off inotropic/pressor support since yesterday.  Currently tolerated -25 cc/h.  Plan to continue with CVVHD for now until current filter expires then would consider transition to IHD over the next 24 to 48 hours, next HD treatment on Thursday if patient remains hemodynamically stable    #2 new onset rapid A-fib, cardiology on board    #3 shock, suspected septic versus cardiogenic, acute HFrEF, currently off Levophed since yesterday    #4 anemia in the setting of ESRD as well as GI bleeding with hematochezia status post EGD and flex sigmoidoscopy by GI.  Status post transfusion.  Monitor H&H, hemoglobin low but stable at 8.2 this morning    #5 history of failed kidney transplant, continue immunosuppression, it should be titrating down to off as per his outpatient nephrologist    #6 type II MI status post cardiac cath with in-stent restenosis of left circumflex and LAD stents, cardiology on board, plan for Impella guided PCI of the distal left main into the LAD and circumflex plan tentatively on 1/18    Recommendations were discussed with critical care team, continue CRRT for now until current filter expires, then will consider transition to IHD over the next 24 to 48 hours, next HD treatment on Thursday, plan for cardiac cath on Thursday    SUBJECTIVE:  Patient seen and examined, feeling okay, denies significant complaints, no chest pain, no shortness of breath, no nausea, no vomiting, remains off yesterday.  Patient seen and examined on CVVHD tolerating -25 cc/h    OBJECTIVE:  Current Weight: Weight - Scale: 49 kg (108 lb 0.4 oz)  Vitals:    01/16/24 1000   BP: (!) 87/51   Pulse: 74   Resp:    Temp:  99.5 °F (37.5 °C)   SpO2: 97%       Intake/Output Summary (Last 24 hours) at 1/16/2024 1048  Last data filed at 1/16/2024 1000  Gross per 24 hour   Intake 1106 ml   Output 1803 ml   Net -697 ml       General: conscious, cooperative, in not acute distress  Eyes: conjunctivae pale, anicteric sclerae  ENT: lips and mucous membranes moist  Neck: supple, no JVD  Chest: clear breath sounds bilateral, no crackles, ronchus or wheezings  CVS: distinct S1 & S2, normal rate, regular rhythm  Abdomen: soft, non-tender, non-distended, normoactive bowel sounds  Extremities: no edema of both legs, right AKA  Skin: no rash  Neuro: awake, alert, oriented  Temporary HD line access with good blood flow        Medications:    Current Facility-Administered Medications:     acetaminophen (TYLENOL) tablet 650 mg, 650 mg, Oral, Q6H PRN, Rosalva Taylor PA-C, 650 mg at 01/15/24 1403    albuterol (PROVENTIL HFA,VENTOLIN HFA) inhaler 2 puff, 2 puff, Inhalation, Q4H PRN, Katie Rose Mariecsek, CRNP    amiodarone tablet 200 mg, 200 mg, Oral, TID With Meals, Katie Rose Mariecsek, CRNP, 200 mg at 01/16/24 0800    aspirin chewable tablet 81 mg, 81 mg, Oral, QAM, Katie Grecsek, CRNP, 81 mg at 01/16/24 0800    atorvastatin (LIPITOR) tablet 80 mg, 80 mg, Oral, Daily With Dinner, Katie Rose Mariecsek, CRNP, 80 mg at 01/15/24 1702    calcitriol (ROCALTROL) capsule 0.5 mcg, 0.5 mcg, Oral, Once per day on Tuesday Thursday Saturday, Katie Rose Mariecsek, CRNP, 0.5 mcg at 01/16/24 0800    cinacalcet (SENSIPAR) tablet 30 mg, 30 mg, Oral, Daily, Katie Rose Mariecsek, CRNP, 30 mg at 01/16/24 0800    clopidogrel (PLAVIX) tablet 75 mg, 75 mg, Oral, Daily, Carlie Chan PA-C, 75 mg at 01/16/24 0800    glycerin-hypromellose- (ARTIFICIAL TEARS) ophthalmic solution 1 drop, 1 drop, Right Eye, Q4H NATACHAN, GAVINO Baker    insulin glargine (LANTUS) subcutaneous injection 6 Units 0.06 mL, 6 Units, Subcutaneous, Q12H JAMARCUS, Carlie Chan PA-C, 6 Units at 01/16/24 0800     insulin lispro (HumaLOG) 100 units/mL subcutaneous injection 1-6 Units, 1-6 Units, Subcutaneous, HS, Katie Grecsek, CRNP, 2 Units at 01/14/24 0216    insulin lispro (HumaLOG) 100 units/mL subcutaneous injection 1-6 Units, 1-6 Units, Subcutaneous, TID With Meals, 1 Units at 01/15/24 1728 **AND** Fingerstick Glucose (POCT), , , TID AC, Katie Grecsek, CRNP    NxStage K 4/Ca 3 dialysis solution (RFP-401) 20,000 mL, 20,000 mL, Dialysis, Continuous, Carlie Chan PA-C, 20,000 mL at 01/16/24 0351    ondansetron (ZOFRAN) injection 4 mg, 4 mg, Intravenous, Q8H PRN, Katie Grecsek, CRNP, 4 mg at 01/14/24 0707    pantoprazole (PROTONIX) injection 40 mg, 40 mg, Intravenous, Q12H JAMARCUS, Andre Mendoza PA-C, 40 mg at 01/16/24 0609    predniSONE tablet 5 mg, 5 mg, Oral, Daily, Katie Grecsek, CRNP, 5 mg at 01/16/24 0800    tacrolimus (PROGRAF) capsule 1 mg, 1 mg, Oral, QPM, Katie Grecsek, CRNP, 1 mg at 01/15/24 1702    tacrolimus (PROGRAF) capsule 2 mg, 2 mg, Oral, QAM, Katie Grecsek, CRNP, 2 mg at 01/16/24 0800    Invasive Devices:        Lab Results:   Results from last 7 days   Lab Units 01/16/24  1010 01/16/24  0419 01/16/24  0406 01/15/24  2155 01/15/24  0958 01/15/24  0425 01/14/24  1001 01/14/24  0338 01/13/24  0901 01/13/24  0243 01/12/24  0844 01/12/24  0431   WBC Thousand/uL  --  15.76*  --   --   --  16.79*  17.31*  --  11.80*   < >  --    < > 10.09   HEMOGLOBIN g/dL 8.2* 8.1*  --  8.2*   < > 7.9*  8.1*   < > 7.2*   < >  --    < > 7.8*   HEMATOCRIT %  --  24.0*  --   --   --  23.2*  23.1*  --  22.0*   < >  --    < > 22.9*   PLATELETS Thousands/uL  --  129*  --   --   --  131*  --  150   < >  --    < > 198   SODIUM mmol/L 133*  --  135 133*   < > 135   < >  --    < >  --    < >  --    POTASSIUM mmol/L 4.6  --  4.5 4.7   < > 4.9   < >  --    < >  --    < >  --    CHLORIDE mmol/L 101  --  103 102   < > 104   < >  --    < >  --    < >  --    CO2 mmol/L 26  --  25 25   < > 22   < >  --    < >  --    < >  --   "  BUN mg/dL 16  --  17 18   < > 18   < >  --    < >  --    < >  --    CREATININE mg/dL 2.06*  --  2.05* 2.05*   < > 1.99*   < >  --    < >  --    < >  --    CALCIUM mg/dL 8.3*  --  9.1 8.7   < > 8.5   < >  --    < >  --    < >  --    MAGNESIUM mg/dL 1.9  --  2.0 2.2   < > 2.0   < >  --    < >  --    < > 2.1   PHOSPHORUS mg/dL 2.7  --  2.3* 2.6*   < > 2.9   < >  --    < >  --    < > 2.8   ALK PHOS U/L  --   --   --   --   --   --   --   --   --  56  --  57   ALT U/L  --   --   --   --   --   --   --   --   --  13  --  12   AST U/L  --   --   --   --   --   --   --   --   --  19  --  18    < > = values in this interval not displayed.       Previous work up:  See previous notes      Portions of the record may have been created with voice recognition software. Occasional wrong word or \"sound a like\" substitutions may have occurred due to the inherent limitations of voice recognition software. Read the chart carefully and recognize, using context, where substitutions have occurred.If you have any questions, please contact the dictating provider.  "

## 2024-01-16 NOTE — OCCUPATIONAL THERAPY NOTE
Occupational Therapy cx        Patient Name: Berto Faria  Today's Date: 1/16/2024 01/16/24 0928   OT Last Visit   OT Visit Date 01/16/24   Note Type   Note Type Cancelled Session   Cancel Reasons Refusal  (pt deferring at this time, reports fatigue and that he was OOB past several days.)         Lorena Barney, DAMION, OTR/L

## 2024-01-16 NOTE — ASSESSMENT & PLAN NOTE
Likely related to NSTEMI from septic shock and element of stress cardiomyopathy with  possible ischemic element given regional wall changes   1/7/24 TTE: LV cavity size is normal. Wall thickness is mildly increased. LVEF 40%. Systolic function is moderately reduced. Following segments are hypokinetic: mid anteroseptal, mid inferior, apical anterior, apical septal, apical inferior and apex.RV normal. Mild MR. Mild TR    Plan:   Volume removal with CRRT  Daily weights  Continue holding home afterload reduction agents/AV aubrie blockers given resolving septic/ cardiogenic/ hemorrhagic  shock:   Carvedilol 6.25mg BID  Isosorbide mononitrate 30mg q24h   Metoprolol succinate 50mg q24  Nifedipine 30mg q24 hours   Meds as above for NSTEMI  Cardiology consulted, appreciate recommendations

## 2024-01-16 NOTE — CONSULTS
Endocrinology Consult Note  Berto Faria 53 y.o. Male MRN: [id number]  Unit/Bed: [facility] 515/[facility] 515-01  Encounter: [id number]    CC: Type 1 diabetes mellitus     Assessment/Plan   Assessment:  Berto Faria is a 53-year-old male with a past medical history significant for type 1 diabetes mellitus (diagnosed age 4) complicated by proliferative retinopathy and nephropathy, s/p islet cell transplant x2, ESRD-HD (TTS), and s/p renal transplant failure and rejection on chronic prednisone therapy who is admitted for further management of respiratory failure, septic shock, and type II NSTEMI with an inpatient course complicated by development of hemorrhagic shock in the setting of a GI bleed (s/p 3u PRBC), new-onset atrial fibrillation, and need for CRRT.     Plan:  Type 1 diabetes mellitus  - A1c: 7.7% (1/7/24)  - Outpatient regimen: Levemir 10 units BID, Novolog CSI (avg. 3-4 units before meals)  - Inpatient regimen: Lantus 6 units BID, Novolog CSI algorithm 3 with meals and at bedtime  - Patient states that he is comfortable managing his diabetes on his own as he has been doing it for a long time and does not wish to have his medications adjusted by endocrinology at this time.  We will defer further insulin titration to the primary team.      History of Present Illness   HPI:  Berto Faria is a 53-year-old male with a past medical history significant for type 1 diabetes mellitus diagnosed at age 4 complicated by proliferative retinopathy, nephropathy, s/p islet cell transplants x2, ESRD-HD (TTS) with history of renal transplant failure and rejection on chronic prednisone therapy, diastolic CHF, anemia of chronic disease, chronic osteomyelitis s/p R AKA, pancreatic lesion, and right retinal detachment with blindness who presented to the ED with complaints of shortness of breath and was admitted for further management of his respiratory failure and NSTEMI with an inpatient course complicated by development of  hemorrhagic shock due to GI bleed and new onset atrial fibrillation with cardiogenic shock now requiring CRRT.  Endocrinology is consulted for further management of his type 1 diabetes mellitus.    States he was diagnosed with type 1 diabetes mellitus at age 4 but has not used an insulin pump before.  He is currently taking Levemir 10 units twice daily which has been transition to a new insulin whose name he cannot recall.  He also takes NovoLog as a correctional's fail insulin up to 3 to 4 units before each meal.  He uses syringes only for his insulin.  He checks his blood glucose at least 3-4 times a day, up to 10 times on some days if his blood sugars are uncontrolled.  His fasting glucose usually range between 130s to 140s while his lunch and dinner glucose are variable.  He occasionally has blood glucose < 60 up to twice a month between the hours of 1 to 3 AM.  He states he wakes up with numb hands with these episodes which resolved with snack.  He currently follows his PCP, Dr. Anderson, for his diabetes management but has not seen him in a long time because he has been busy with work and dialysis.  He denies missing any doses of his insulin.  He has been maintained on prednisone for over 20 years.  He has an older brother with type 1 diabetes mellitus.      Inpatient consult to Endocrinology  Consult performed by: Francheska Lay DO  Consult ordered by: Carlie Chan PA-C        Review of Systems   Constitutional:  Negative for appetite change, chills, fever and unexpected weight change.   HENT:  Negative for ear pain and sore throat.    Eyes:  Negative for pain and visual disturbance.   Respiratory:  Negative for cough and shortness of breath.    Cardiovascular:  Negative for chest pain and palpitations.   Gastrointestinal:  Negative for abdominal pain, constipation, diarrhea, nausea and vomiting.   Endocrine: Negative for polydipsia, polyphagia and polyuria.   Genitourinary:  Negative for hematuria.         Anuric   Musculoskeletal:  Negative for arthralgias and back pain.   Skin:  Negative for color change and rash.   Neurological:  Negative for seizures, syncope and numbness.   All other systems reviewed and are negative.      Historical Information   Past Medical History:   Diagnosis Date    Bacteremia 12/21/2018    CAD (coronary artery disease)     s/p MARC to LCx, pLAD 2/2018    Cardiac arrest (HCC)     Chronic kidney disease     Diabetes mellitus type 1 (HCC)     Dialysis patient ([facility])     Access in right chest    GI bleed     Hyperlipidemia     Hypertension     Infection at site of external fixator pin (HCC)     MI (myocardial infarction) (Formerly [facility])     Myocardial infarction (HCC)     Pneumonia     Last Assessed [id number]    Renal failure     Renal transplant, status post 07/21/2007     Past Surgical History:   Procedure Laterality Date    AMPUTATION Right 02/2019    aboce knee    ANKLE FRACTURE SURGERY      ANKLE HARDWARE REMOVAL Right 7/31/2017    Procedure: REMOVAL HARDWARE ANKLE;  Surgeon: Alvin Leon MD;  Location: [facility];  Service: Orthopedics    ANKLE HARDWARE REMOVAL Right 8/17/2017    Procedure: TIBIA FAILED HARDWARE REMOVAL;  Surgeon: Alvin Leon MD;  Location: [facility];  Service: Orthopedics    CARDIAC CATHETERIZATION Left 1/12/2024    Procedure: Cardiac Left Heart Cath;  Surgeon: Spencer Bloom MD;  Location: [facility] CARDIAC CATH LAB;  Service: Cardiology    CLOSED REDUCTION ANKLE Right 7/3/2017    Procedure: CLOSED REDUCTION DISTAL TIB-FIB AND CASTING VS;  Surgeon: Alvin Leon MD;  Location: [facility];  Service: Orthopedics    CORONARY ANGIOPLASTY WITH STENT PLACEMENT  02/2018    CAD s/p MARC to LCx, pLAD    ESOPHAGOGASTRODUODENOSCOPY N/A 12/20/2018    Procedure: ESOPHAGOGASTRODUODENOSCOPY (EGD) in ICU;  Surgeon: Galileo Wise IV, MD;  Location: [facility] GI LAB;  Service: Gastroenterology    EYE SURGERY      FRACTURE SURGERY      ORIF Rt Ankle    GLUTAMIC ACID DECARBOXYLASE (HISTORICAL)      IR AV  FISTULAGRAM/GRAFTOGRAM  4/16/2020    IR AV FISTULAGRAM/GRAFTOGRAM  6/23/2023    IR PICC LINE  8/20/2018    IR TEMPORARY DIALYSIS CATHETER CHECK/CHANGE/REPOSITION  1/8/2024    IR TUNNELED DIALYSIS CATHETER CHECK/CHANGE/REPOSITION/ANGIOPLASTY  1/8/2020    IR TUNNELED DIALYSIS CATHETER PLACEMENT  10/21/2019    IR TUNNELED DIALYSIS CATHETER REMOVAL  5/29/2020    IR VENOUS LINE REMOVAL  10/5/2018    LEG AMPUTATION Right 02/01/2019    Above the knee    NEPHRECTOMY TRANSPLANTED ORGAN      AR ARTERIOVENOUS ANASTOMOSIS OPEN DIRECT Right 2/11/2020    Procedure: CREATION FISTULA ARTERIOVENOUS (AV) right upper extremity;  Surgeon: Carlos Medina MD;  Location:  MAIN OR;  Service: Vascular    AR OPEN TREATMENT FRACTURE DISTAL TIBIA FIBULA Right 7/3/2017    Procedure: OPEN REDUCTION W/ INTERNAL FIXATION (ORIF);  Surgeon: Alvin Leon MD;  Location: [facility];  Service: Orthopedics    TOE AMPUTATION Right 10/27/2016    Procedure: AMPUTATION TOE;  Surgeon: Krishna Ruiz DPM;  Location: [facility];  Service:      Social History   Social History     Substance and Sexual Activity   Alcohol Use Not Currently    Alcohol/week: 0.0 standard drinks of alcohol     Social History     Substance and Sexual Activity   Drug Use Never     Social History     Tobacco Use   Smoking Status Never   Smokeless Tobacco Never     Family History:   Family History   Problem Relation Age of Onset    Diabetes Brother     Coronary artery disease Mother     No Known Problems Father        Meds/Allergies   Current Facility-Administered Medications   Medication Dose Route Frequency Provider Last Rate Last Admin    acetaminophen (TYLENOL) tablet 650 mg  650 mg Oral Q6H PRN Rosalva Taylor PA-C   650 mg at 01/15/24 1403    albuterol (PROVENTIL HFA,VENTOLIN HFA) inhaler 2 puff  2 puff Inhalation Q4H PRN GAVINO Baker        amiodarone tablet 200 mg  200 mg Oral TID With Meals GAVINO Baker   200 mg at 01/16/24 1106    aspirin chewable tablet  "81 mg  81 mg Oral QAM GAVINO Baker   81 mg at 01/16/24 0800    atorvastatin (LIPITOR) tablet 80 mg  80 mg Oral Daily With Dinner GAVINO Baker   80 mg at 01/15/24 1702    calcitriol (ROCALTROL) capsule 0.5 mcg  0.5 mcg Oral Once per day on Tuesday Thursday Saturday GAVINO Baker   0.5 mcg at 01/16/24 0800    cinacalcet (SENSIPAR) tablet 30 mg  30 mg Oral Daily GAVINO Baker   30 mg at 01/16/24 0800    clopidogrel (PLAVIX) tablet 75 mg  75 mg Oral Daily Carlie Chan PA-C   75 mg at 01/16/24 0800    glycerin-hypromellose- (ARTIFICIAL TEARS) ophthalmic solution 1 drop  1 drop Right Eye Q4H PRN GAVINO Baker        insulin glargine (LANTUS) subcutaneous injection 6 Units 0.06 mL  6 Units Subcutaneous Q12H [facility] Carlie Chan PA-C   6 Units at 01/16/24 0800    insulin lispro (HumaLOG) 100 units/mL subcutaneous injection 1-6 Units  1-6 Units Subcutaneous HS GAVINO Baker   2 Units at 01/14/24 0216    insulin lispro (HumaLOG) 100 units/mL subcutaneous injection 1-6 Units  1-6 Units Subcutaneous TID With Meals GAVINO Baker   1 Units at 01/16/24 1106    NxStage K 4/Ca 3 dialysis solution (RFP-401) 20,000 mL  20,000 mL Dialysis Continuous Carlie Chan PA-C   20,000 mL at 01/16/24 0351    ondansetron (ZOFRAN) injection 4 mg  4 mg Intravenous Q8H PRN GAVINO Baker   4 mg at 01/14/24 0707    pantoprazole (PROTONIX) injection 40 mg  40 mg Intravenous Q12H [facility] Andre Mendoza PA-C   40 mg at 01/16/24 0609    predniSONE tablet 5 mg  5 mg Oral Daily GAVINO Baker   5 mg at 01/16/24 0800    tacrolimus (PROGRAF) capsule 1 mg  1 mg Oral QPM Katie Grecsek, CRNP   1 mg at 01/15/24 1702    tacrolimus (PROGRAF) capsule 2 mg  2 mg Oral QAM Katie Ellaek, CRNP   2 mg at 01/16/24 0800     No Known Allergies    Objective   Vitals:   Blood pressure 104/61, pulse 83, temperature 99.5 °F (37.5 °C), temperature source Rectal, resp. rate 21, height 5' 4\" " (1.626 m), weight 49 kg (108 lb 0.4 oz), SpO2 99%.    Intake/Output Summary (Last 24 hours) at 1/16/2024 1329  Last data filed at 1/16/2024 1100  Gross per 24 hour   Intake 714 ml   Output 1468 ml   Net -754 ml     Invasive Devices       Peripheral Intravenous Line  Duration             Peripheral IV 01/13/24 Left;Upper;Ventral (anterior) Arm 3 days              Line  Duration             Hemodialysis AV Fistula Right Upper arm -- days              Hemodialysis Catheter  Duration             HD Permanent Double Catheter 7 days                    Physical Exam  Vitals reviewed.   Constitutional:       Appearance: Normal appearance.   HENT:      Head: Normocephalic and atraumatic.      Mouth/Throat:      Mouth: Mucous membranes are moist.      Pharynx: Oropharynx is clear.   Eyes:      Extraocular Movements: Extraocular movements intact.      Pupils: Pupils are equal, round, and reactive to light.   Cardiovascular:      Rate and Rhythm: Normal rate and regular rhythm.      Pulses: Normal pulses.      Heart sounds: Normal heart sounds.   Pulmonary:      Effort: Pulmonary effort is normal.      Breath sounds: Rhonchi present. No wheezing or rales.   Abdominal:      General: Abdomen is flat. Bowel sounds are normal. There is no distension.      Tenderness: There is no abdominal tenderness.   Musculoskeletal:         General: Normal range of motion.      Cervical back: Normal range of motion and neck supple. No tenderness.      Left lower leg: No edema.      Comments: R AKA   Skin:     General: Skin is warm and dry.   Neurological:      General: No focal deficit present.      Mental Status: He is alert and oriented to person, place, and time.         The history was obtained from the review of the chart, patient.    Lab Results:       Lab Results   Component Value Date    WBC 15.76 (H) 01/16/2024    HGB 8.2 (L) 01/16/2024    HCT 24.0 (L) 01/16/2024    MCV 99 (H) 01/16/2024     (L) 01/16/2024     Lab Results  "  Component Value Date/Time    BUN 16 01/16/2024 10:10 AM    BUN 31 (H) 11/06/2015 07:34 AM     11/06/2015 07:34 AM    K 4.6 01/16/2024 10:10 AM    K 4.4 11/06/2015 07:34 AM     01/16/2024 10:10 AM     11/06/2015 07:34 AM    CO2 26 01/16/2024 10:10 AM    CO2 13 (L) 02/14/2018 01:33 AM    CREATININE 2.06 (H) 01/16/2024 10:10 AM    CREATININE 1.26 11/06/2015 07:34 AM    AST 19 01/13/2024 02:43 AM    AST 12 11/06/2015 07:34 AM    ALT 13 01/13/2024 02:43 AM    ALT 34 11/06/2015 07:34 AM    TP 5.6 (L) 01/13/2024 02:43 AM    ALB 3.2 (L) 01/13/2024 02:43 AM    ALB 3.6 11/06/2015 07:34 AM     No results for input(s): \"CHOL\", \"HDL\", \"LDL\", \"TRIG\", \"VLDL\" in the last 72 hours.  No results found for: \"MICROALBUR\", \"LJBB13JMM\"  POC Glucose (mg/dl)   Date Value   01/16/2024 155 (H)   01/16/2024 117       Imaging Studies: I have personally reviewed pertinent reports.      Portions of the record may have been created with voice recognition software.     Please TigerText questions to the clinician covering the \"[id number]\" Role. Thank you.  "

## 2024-01-16 NOTE — ASSESSMENT & PLAN NOTE
Presented to [facility] ED with shortness of breath. Troponin and EKG checked as part of initial workup. EKG without acute ischemic changes   NSTEMI likely related to increased demand from acute hypoxic respiratory failure and septic shock   Troponin 77606 > 33651 > 59424   Cardiac Cath 1/12:Severe in-stent restenosis bifurcation ostial LAD/LCx,  RCA, elevated LVEDP, porcelain aorta.  CT Surgery Consulted-recommendations for supportive care or high risk PCI    Plan:  ASA 81mg daily, Plavix 75 Daily   Atorvastatin 80mg qHS   Continue holding home afterload reduction agents/AV aubrie blockers given boarderline Blood Pressure    Carvedilol 6.25mg BID  Isosorbide mononitrate 30mg q24h   Metoprolol succinate 50mg q24  Nifedipine 30mg q24 hours   Cardiology consulted, appreciate recommendations- High risk PCI tentatively planned for 1/18

## 2024-01-17 ENCOUNTER — APPOINTMENT (OUTPATIENT)
Dept: PHYSICAL THERAPY | Facility: CLINIC | Age: 54
End: 2024-01-17
Payer: MEDICARE

## 2024-01-17 LAB
ABO GROUP BLD: NORMAL
ANION GAP SERPL CALCULATED.3IONS-SCNC: 8 MMOL/L
APTT PPP: 44 SECONDS (ref 23–37)
APTT PPP: >210 SECONDS (ref 23–37)
APTT PPP: >210 SECONDS (ref 23–37)
BLD GP AB SCN SERPL QL: NEGATIVE
BUN SERPL-MCNC: 23 MG/DL (ref 5–25)
CA-I BLD-SCNC: 1.14 MMOL/L (ref 1.12–1.32)
CALCIUM SERPL-MCNC: 8.4 MG/DL (ref 8.4–10.2)
CHLORIDE SERPL-SCNC: 103 MMOL/L (ref 96–108)
CO2 SERPL-SCNC: 24 MMOL/L (ref 21–32)
CREAT SERPL-MCNC: 2.95 MG/DL (ref 0.6–1.3)
ERYTHROCYTE [DISTWIDTH] IN BLOOD BY AUTOMATED COUNT: 22.5 % (ref 11.6–15.1)
ERYTHROCYTE [DISTWIDTH] IN BLOOD BY AUTOMATED COUNT: 22.8 % (ref 11.6–15.1)
GFR SERPL CREATININE-BSD FRML MDRD: 22 ML/MIN/1.73SQ M
GLUCOSE SERPL-MCNC: 142 MG/DL (ref 65–140)
GLUCOSE SERPL-MCNC: 156 MG/DL (ref 65–140)
GLUCOSE SERPL-MCNC: 186 MG/DL (ref 65–140)
GLUCOSE SERPL-MCNC: 189 MG/DL (ref 65–140)
GLUCOSE SERPL-MCNC: 211 MG/DL (ref 65–140)
GLUCOSE SERPL-MCNC: 216 MG/DL (ref 65–140)
GLUCOSE SERPL-MCNC: 238 MG/DL (ref 65–140)
GLUCOSE SERPL-MCNC: 243 MG/DL (ref 65–140)
GLUCOSE SERPL-MCNC: 251 MG/DL (ref 65–140)
HCT VFR BLD AUTO: 23.9 % (ref 36.5–49.3)
HCT VFR BLD AUTO: 25 % (ref 36.5–49.3)
HGB BLD-MCNC: 7.6 G/DL (ref 12–17)
HGB BLD-MCNC: 7.7 G/DL (ref 12–17)
MAGNESIUM SERPL-MCNC: 2.2 MG/DL (ref 1.9–2.7)
MCH RBC QN AUTO: 32.5 PG (ref 26.8–34.3)
MCH RBC QN AUTO: 33.2 PG (ref 26.8–34.3)
MCHC RBC AUTO-ENTMCNC: 30.8 G/DL (ref 31.4–37.4)
MCHC RBC AUTO-ENTMCNC: 31.8 G/DL (ref 31.4–37.4)
MCV RBC AUTO: 104 FL (ref 82–98)
MCV RBC AUTO: 106 FL (ref 82–98)
PHOSPHATE SERPL-MCNC: 4 MG/DL (ref 2.7–4.5)
PLATELET # BLD AUTO: 107 THOUSANDS/UL (ref 149–390)
PLATELET # BLD AUTO: 108 THOUSANDS/UL (ref 149–390)
PMV BLD AUTO: 10.7 FL (ref 8.9–12.7)
PMV BLD AUTO: 11.1 FL (ref 8.9–12.7)
POTASSIUM SERPL-SCNC: 5 MMOL/L (ref 3.5–5.3)
RBC # BLD AUTO: 2.29 MILLION/UL (ref 3.88–5.62)
RBC # BLD AUTO: 2.37 MILLION/UL (ref 3.88–5.62)
RH BLD: POSITIVE
SODIUM SERPL-SCNC: 135 MMOL/L (ref 135–147)
SPECIMEN EXPIRATION DATE: NORMAL
WBC # BLD AUTO: 11.1 THOUSAND/UL (ref 4.31–10.16)
WBC # BLD AUTO: 11.2 THOUSAND/UL (ref 4.31–10.16)

## 2024-01-17 PROCEDURE — 99233 SBSQ HOSP IP/OBS HIGH 50: CPT | Performed by: ANESTHESIOLOGY

## 2024-01-17 PROCEDURE — 86923 COMPATIBILITY TEST ELECTRIC: CPT

## 2024-01-17 PROCEDURE — 83735 ASSAY OF MAGNESIUM: CPT | Performed by: PHYSICIAN ASSISTANT

## 2024-01-17 PROCEDURE — 5A1D90Z PERFORMANCE OF URINARY FILTRATION, CONTINUOUS, GREATER THAN 18 HOURS PER DAY: ICD-10-PCS | Performed by: INTERNAL MEDICINE

## 2024-01-17 PROCEDURE — 99232 SBSQ HOSP IP/OBS MODERATE 35: CPT | Performed by: INTERNAL MEDICINE

## 2024-01-17 PROCEDURE — 85730 THROMBOPLASTIN TIME PARTIAL: CPT | Performed by: PHYSICIAN ASSISTANT

## 2024-01-17 PROCEDURE — P9058 RBC, L/R, CMV-NEG, IRRAD: HCPCS

## 2024-01-17 PROCEDURE — 86900 BLOOD TYPING SEROLOGIC ABO: CPT | Performed by: PHYSICIAN ASSISTANT

## 2024-01-17 PROCEDURE — 99233 SBSQ HOSP IP/OBS HIGH 50: CPT | Performed by: INTERNAL MEDICINE

## 2024-01-17 PROCEDURE — 86850 RBC ANTIBODY SCREEN: CPT | Performed by: PHYSICIAN ASSISTANT

## 2024-01-17 PROCEDURE — 85027 COMPLETE CBC AUTOMATED: CPT | Performed by: PHYSICIAN ASSISTANT

## 2024-01-17 PROCEDURE — 82948 REAGENT STRIP/BLOOD GLUCOSE: CPT

## 2024-01-17 PROCEDURE — 84100 ASSAY OF PHOSPHORUS: CPT | Performed by: PHYSICIAN ASSISTANT

## 2024-01-17 PROCEDURE — 82330 ASSAY OF CALCIUM: CPT | Performed by: PHYSICIAN ASSISTANT

## 2024-01-17 PROCEDURE — NC001 PR NO CHARGE: Performed by: INTERNAL MEDICINE

## 2024-01-17 PROCEDURE — 90945 DIALYSIS ONE EVALUATION: CPT

## 2024-01-17 PROCEDURE — 86901 BLOOD TYPING SEROLOGIC RH(D): CPT | Performed by: PHYSICIAN ASSISTANT

## 2024-01-17 PROCEDURE — 30233N1 TRANSFUSION OF NONAUTOLOGOUS RED BLOOD CELLS INTO PERIPHERAL VEIN, PERCUTANEOUS APPROACH: ICD-10-PCS | Performed by: INTERNAL MEDICINE

## 2024-01-17 PROCEDURE — 80048 BASIC METABOLIC PNL TOTAL CA: CPT | Performed by: PHYSICIAN ASSISTANT

## 2024-01-17 PROCEDURE — 88305 TISSUE EXAM BY PATHOLOGIST: CPT | Performed by: PATHOLOGY

## 2024-01-17 PROCEDURE — C9113 INJ PANTOPRAZOLE SODIUM, VIA: HCPCS | Performed by: PHYSICIAN ASSISTANT

## 2024-01-17 RX ORDER — HEPARIN SODIUM 10000 [USP'U]/100ML
3-20 INJECTION, SOLUTION INTRAVENOUS
Status: DISCONTINUED | OUTPATIENT
Start: 2024-01-17 | End: 2024-01-18

## 2024-01-17 RX ORDER — INSULIN LISPRO 100 [IU]/ML
1 INJECTION, SOLUTION INTRAVENOUS; SUBCUTANEOUS ONCE
Status: COMPLETED | OUTPATIENT
Start: 2024-01-17 | End: 2024-01-17

## 2024-01-17 RX ADMIN — PREDNISONE 5 MG: 5 TABLET ORAL at 08:03

## 2024-01-17 RX ADMIN — INSULIN LISPRO 1 UNITS: 100 INJECTION, SOLUTION INTRAVENOUS; SUBCUTANEOUS at 14:44

## 2024-01-17 RX ADMIN — AMIODARONE HYDROCHLORIDE 200 MG: 200 TABLET ORAL at 17:09

## 2024-01-17 RX ADMIN — Medication 3 MG: at 23:30

## 2024-01-17 RX ADMIN — ATORVASTATIN CALCIUM 80 MG: 80 TABLET, FILM COATED ORAL at 17:09

## 2024-01-17 RX ADMIN — INSULIN LISPRO 1 UNITS: 100 INJECTION, SOLUTION INTRAVENOUS; SUBCUTANEOUS at 07:26

## 2024-01-17 RX ADMIN — TACROLIMUS 2 MG: 1 CAPSULE ORAL at 08:03

## 2024-01-17 RX ADMIN — ASPIRIN 81 MG CHEWABLE TABLET 81 MG: 81 TABLET CHEWABLE at 08:03

## 2024-01-17 RX ADMIN — CINACALCET 30 MG: 30 TABLET, FILM COATED ORAL at 08:03

## 2024-01-17 RX ADMIN — PANTOPRAZOLE SODIUM 40 MG: 40 INJECTION, POWDER, FOR SOLUTION INTRAVENOUS at 05:01

## 2024-01-17 RX ADMIN — INSULIN GLARGINE 6 UNITS: 100 INJECTION, SOLUTION SUBCUTANEOUS at 20:11

## 2024-01-17 RX ADMIN — ACETAMINOPHEN 650 MG: 325 TABLET, FILM COATED ORAL at 13:28

## 2024-01-17 RX ADMIN — INSULIN GLARGINE 6 UNITS: 100 INJECTION, SOLUTION SUBCUTANEOUS at 08:03

## 2024-01-17 RX ADMIN — TACROLIMUS 1 MG: 1 CAPSULE ORAL at 17:09

## 2024-01-17 RX ADMIN — PANTOPRAZOLE SODIUM 40 MG: 40 INJECTION, POWDER, FOR SOLUTION INTRAVENOUS at 17:09

## 2024-01-17 RX ADMIN — INSULIN LISPRO 1 UNITS: 100 INJECTION, SOLUTION INTRAVENOUS; SUBCUTANEOUS at 21:42

## 2024-01-17 RX ADMIN — AMIODARONE HYDROCHLORIDE 200 MG: 200 TABLET ORAL at 11:30

## 2024-01-17 RX ADMIN — AMIODARONE HYDROCHLORIDE 200 MG: 200 TABLET ORAL at 07:35

## 2024-01-17 RX ADMIN — INSULIN LISPRO 2 UNITS: 100 INJECTION, SOLUTION INTRAVENOUS; SUBCUTANEOUS at 16:25

## 2024-01-17 RX ADMIN — CLOPIDOGREL BISULFATE 75 MG: 75 TABLET ORAL at 08:03

## 2024-01-17 RX ADMIN — INSULIN LISPRO 3 UNITS: 100 INJECTION, SOLUTION INTRAVENOUS; SUBCUTANEOUS at 11:30

## 2024-01-17 RX ADMIN — HEPARIN SODIUM 12 UNITS/KG/HR: 10000 INJECTION, SOLUTION INTRAVENOUS at 07:26

## 2024-01-17 NOTE — PHYSICAL THERAPY NOTE
Physical Therapy Cancellation Note           01/17/24 1326   PT Last Visit   PT Visit Date 01/17/24   Note Type   Note Type Cancelled Session   Cancel Reasons Refusal     Attempted to see pt this PM for treatment session. Pt refusing PT at this time stating fatigue and requests we come back tomorrow. PT will hold and continue to follow.     Abby Shepherd PT, DPT

## 2024-01-17 NOTE — ASSESSMENT & PLAN NOTE
Likely related to NSTEMI from septic shock and element of stress cardiomyopathy with  possible ischemic element given regional wall changes   1/7/24 TTE: LV cavity size is normal. Wall thickness is mildly increased. LVEF 40%. Systolic function is moderately reduced. Following segments are hypokinetic: mid anteroseptal, mid inferior, apical anterior, apical septal, apical inferior and apex.RV normal. Mild MR. Mild TR    Plan:   Volume removal with HD  Daily weights  Continue holding home afterload reduction agents/AV aubrie blockers given resolving septic/ cardiogenic/ hemorrhagic  shock:   Carvedilol 6.25mg BID  Isosorbide mononitrate 30mg q24h   Metoprolol succinate 50mg q24  Nifedipine 30mg q24 hours   Meds as above for NSTEMI  Cardiology consulted, appreciate recommendations

## 2024-01-17 NOTE — ASSESSMENT & PLAN NOTE
Presented to [facility] ED with shortness of breath. Troponin and EKG checked as part of initial workup. EKG without acute ischemic changes   NSTEMI likely related to increased demand from acute hypoxic respiratory failure and septic shock   Troponin 60918 > 74718 > 39516   Cardiac Cath 1/12:Severe in-stent restenosis bifurcation ostial LAD/LCx,  RCA, elevated LVEDP, porcelain aorta.  CT Surgery Consulted-recommendations for supportive care or high risk PCI    Plan:  ASA 81mg daily, Plavix 75 Daily   Atorvastatin 80mg qHS   Continue holding home afterload reduction agents/AV aubrie blockers given boarderline Blood Pressure    Carvedilol 6.25mg BID  Isosorbide mononitrate 30mg q24h   Metoprolol succinate 50mg q24  Nifedipine 30mg q24 hours   Cardiology consulted, appreciate recommendations- High risk PCI tentatively planned for 1/18

## 2024-01-17 NOTE — CONSULTS
Consultation - Interventional Cardiology  Berto Faria 53 y.o. male MRN: [id number]  Unit/Bed#: [facility] 515-01 Encounter: [id number]      Consults  PCP: Dalton Anderson DO     History of Present Illness   Physician Requesting Consult: Nieves Rios MD  Reason for Consult / Principal Problem: Impella supported LM intervention    HPI: Berto Faria is a 53 y.o. year old male w/ history of ESRD with failed renal transplant on immunosuppressive meds (tacrolimus), known CAD (ostial LAD and ostial Lcx stent), Right AKA 2/2 OM, H/O GIB (nonbleeding h.pylori ulcers causing hgb drop to 2.7 in 2019), HTN, HLD, DM2. Back in 2018, patient went in for elective ortho procedure OSH, had flash pulmonary edema, requiring intubation with signs of ischemia on ECG. Patient was discharged with the plans for outpatient ischemic workup. Patient was readmiited later with PEA arrest, trop elevation, and new cardiomyopahthy, and was found to have ostial LAD and ostial LCX lesion, underwent PCI of those 2 vessels. Patient reported to have residual long 50% lesion in midLAD.      On this admission, he came in with acute onset dyspnea and was admitted with hypoxic respiratory failure 2/2 multifocal PNA shown on CT. Patient was found to be in septic shock requiring vasopressor support. His troponin level stayed around 11,000 from the beginning. ECG shows NSR with inferolateral ST depression. Echo done on admission showed EF of 40% and mid anterior, anteroseptal and apical hypokinesis consistent with LAD territory ischemia. Patient underwent LHC which revealed severe ISR of the ostial LAD/LCX bifurcation stents. Patient had an episode of hematochezia, underwent EGD which showed a duodenal ulcer now s/p clipping. Patient has been on DAPT and heparin gtt since then, tolerating well without bleeding.           Review of Systems  Review of system was conducted and was negative except for as stated in the HPI.      Historical Information   Past  Medical History:   Diagnosis Date    Bacteremia 12/21/2018    CAD (coronary artery disease)     s/p MARC to LCx, pLAD 2/2018    Cardiac arrest (HCC)     Chronic kidney disease     Diabetes mellitus type 1 (HCC)     Dialysis patient (HCC)     Access in right chest    GI bleed     Hyperlipidemia     Hypertension     Infection at site of external fixator pin (HCC)     MI (myocardial infarction) (HCC)     Myocardial infarction (HCC)     Pneumonia     Last Assessed [id number]    Renal failure     Renal transplant, status post 07/21/2007     Past Surgical History:   Procedure Laterality Date    AMPUTATION Right 02/2019    aboce knee    ANKLE FRACTURE SURGERY      ANKLE HARDWARE REMOVAL Right 7/31/2017    Procedure: REMOVAL HARDWARE ANKLE;  Surgeon: Alvin Leon MD;  Location: [facility];  Service: Orthopedics    ANKLE HARDWARE REMOVAL Right 8/17/2017    Procedure: TIBIA FAILED HARDWARE REMOVAL;  Surgeon: Alvin Leon MD;  Location: [facility];  Service: Orthopedics    CARDIAC CATHETERIZATION Left 1/12/2024    Procedure: Cardiac Left Heart Cath;  Surgeon: Spencer Bloom MD;  Location: [facility];  Service: Cardiology    CLOSED REDUCTION ANKLE Right 7/3/2017    Procedure: CLOSED REDUCTION DISTAL TIB-FIB AND CASTING VS;  Surgeon: Alvin Leon MD;  Location: [facility];  Service: Orthopedics    CORONARY ANGIOPLASTY WITH STENT PLACEMENT  02/2018    CAD s/p MARC to LCx, pLAD    ESOPHAGOGASTRODUODENOSCOPY N/A 12/20/2018    Procedure: ESOPHAGOGASTRODUODENOSCOPY (EGD) in ICU;  Surgeon: Galileo Wise IV, MD;  Location: [facility];  Service: Gastroenterology    EYE SURGERY      FRACTURE SURGERY      ORIF Rt Ankle    GLUTAMIC ACID DECARBOXYLASE (HISTORICAL)      IR AV FISTULAGRAM/GRAFTOGRAM  4/16/2020    IR AV FISTULAGRAM/GRAFTOGRAM  6/23/2023    IR PICC LINE  8/20/2018    IR TEMPORARY DIALYSIS CATHETER CHECK/CHANGE/REPOSITION  1/8/2024    IR TUNNELED DIALYSIS CATHETER CHECK/CHANGE/REPOSITION/ANGIOPLASTY  1/8/2020    IR  TUNNELED DIALYSIS CATHETER PLACEMENT  10/21/2019    IR TUNNELED DIALYSIS CATHETER REMOVAL  5/29/2020    IR VENOUS LINE REMOVAL  10/5/2018    LEG AMPUTATION Right 02/01/2019    Above the knee    NEPHRECTOMY TRANSPLANTED ORGAN      ID ARTERIOVENOUS ANASTOMOSIS OPEN DIRECT Right 2/11/2020    Procedure: CREATION FISTULA ARTERIOVENOUS (AV) right upper extremity;  Surgeon: Carlos Medina MD;  Location:  MAIN OR;  Service: Vascular    ID OPEN TREATMENT FRACTURE DISTAL TIBIA FIBULA Right 7/3/2017    Procedure: OPEN REDUCTION W/ INTERNAL FIXATION (ORIF);  Surgeon: Alvin Leon MD;  Location: [facility];  Service: Orthopedics    TOE AMPUTATION Right 10/27/2016    Procedure: AMPUTATION TOE;  Surgeon: Krishna Ruiz DPM;  Location: [facility];  Service:      Social History     Substance and Sexual Activity   Alcohol Use Not Currently    Alcohol/week: 0.0 standard drinks of alcohol     Social History     Substance and Sexual Activity   Drug Use Never     Social History     Tobacco Use   Smoking Status Never   Smokeless Tobacco Never     Family History: non-contributory    Meds/Allergies   Hospital Medications:   Current Facility-Administered Medications   Medication Dose Route Frequency    acetaminophen (TYLENOL) tablet 650 mg  650 mg Oral Q6H PRN    albuterol (PROVENTIL HFA,VENTOLIN HFA) inhaler 2 puff  2 puff Inhalation Q4H PRN    amiodarone tablet 200 mg  200 mg Oral TID With Meals    aspirin chewable tablet 81 mg  81 mg Oral QAM    atorvastatin (LIPITOR) tablet 80 mg  80 mg Oral Daily With Dinner    calcitriol (ROCALTROL) capsule 0.5 mcg  0.5 mcg Oral Once per day on Tuesday Thursday Saturday    cinacalcet (SENSIPAR) tablet 30 mg  30 mg Oral Daily    clopidogrel (PLAVIX) tablet 75 mg  75 mg Oral Daily    glycerin-hypromellose- (ARTIFICIAL TEARS) ophthalmic solution 1 drop  1 drop Right Eye Q4H PRN    heparin (porcine) 25,000 units in 0.45% NaCl 250 mL infusion (premix)  3-20 Units/kg/hr (Order-Specific) Intravenous  "Titrated    insulin glargine (LANTUS) subcutaneous injection 6 Units 0.06 mL  6 Units Subcutaneous Q12H JAMARCUS    insulin lispro (HumaLOG) 100 units/mL subcutaneous injection 1-6 Units  1-6 Units Subcutaneous HS    insulin lispro (HumaLOG) 100 units/mL subcutaneous injection 1-6 Units  1-6 Units Subcutaneous TID With Meals    melatonin tablet 3 mg  3 mg Oral HS    ondansetron (ZOFRAN) injection 4 mg  4 mg Intravenous Q8H PRN    pantoprazole (PROTONIX) injection 40 mg  40 mg Intravenous Q12H JAMARCUS    predniSONE tablet 5 mg  5 mg Oral Daily    tacrolimus (PROGRAF) capsule 1 mg  1 mg Oral QPM    tacrolimus (PROGRAF) capsule 2 mg  2 mg Oral QAM     Home Medications:   Medications Prior to Admission   Medication    aspirin 81 mg chewable tablet    atorvastatin (LIPITOR) 80 mg tablet    azaTHIOprine (IMURAN) 50 mg tablet    carvedilol (COREG) 6.25 mg tablet    cholecalciferol (VITAMIN D3) 1,000 units tablet    cinacalcet (SENSIPAR) 30 mg tablet    Glucagon, rDNA, (Glucagon Emergency) 1 MG KIT    glucose blood (Contour Next Test) test strip    insulin aspart (NovoLOG) 100 units/mL injection    insulin glargine (Semglee) 100 units/mL subcutaneous injection    Insulin Syringe-Needle U-100 31G X 15/64\" 0.5 ML MISC    isosorbide mononitrate (IMDUR) 30 mg 24 hr tablet    metoprolol succinate (TOPROL-XL) 50 mg 24 hr tablet    multivitamin (THERAGRAN) TABS    NIFEdipine (PROCARDIA XL) 30 mg 24 hr tablet    pantoprazole (PROTONIX) 40 mg tablet    predniSONE 5 mg tablet    tacrolimus (PROGRAF) 1 mg capsule       No Known Allergies    Objective   Vitals: Blood pressure (!) 89/52, pulse 72, temperature 97.6 °F (36.4 °C), temperature source Oral, resp. rate 18, height 5' 4\" (1.626 m), weight 48.9 kg (107 lb 12.9 oz), SpO2 100%.  Orthostatic Blood Pressures      Flowsheet Row Most Recent Value   Blood Pressure 89/52 filed at 01/17/2024 1300   Patient Position - Orthostatic VS Sitting filed at 01/17/2024 0930              Invasive Devices  "      Peripheral Intravenous Line  Duration             Peripheral IV 01/17/24 Dorsal (posterior);Left Forearm <1 day              Line  Duration             Hemodialysis AV Fistula Right Upper arm -- days              Hemodialysis Catheter  Duration             HD Permanent Double Catheter 8 days                    Physical Exam    GEN: Berto Faria appears well, alert and oriented x 3, pleasant and cooperative   HEENT:  Normocephalic, atraumatic, anicteric, moist mucous membranes  NECK: No JVD or carotid bruits   HEART: RRR, normal S1 and S2, no murmurs, clicks, gallops or rubs   LUNGS: Clear to auscultation bilaterally; no wheezes, rales, or rhonchi; respiration nonlabored   ABDOMEN:  Normoactive bowel sounds, soft, no tenderness, no distention  EXTREMITIES: peripheral pulses palpable; no edema  NEURO: no gross focal findings; cranial nerves grossly intact   SKIN:  Dry, intact, warm to touch    Lab Results: I have personally reviewed pertinent lab results.    Results from last 7 days   Lab Units 01/11/24  2229 01/11/24  2035 01/11/24  1840   HS TNI 0HR ng/L  --   --  11,399*   HS TNI 2HR ng/L  --  10,340*  --    HS TNI 4HR ng/L 9,884*  --   --          Results from last 7 days   Lab Units 01/17/24  0603 01/16/24  2158 01/16/24  1602   POTASSIUM mmol/L 5.0 4.9 4.7   CO2 mmol/L 24 26 27   CHLORIDE mmol/L 103 104 101   BUN mg/dL 23 15 16   CREATININE mg/dL 2.95* 2.30* 2.31*     Results from last 7 days   Lab Units 01/17/24  0441 01/17/24  0030 01/16/24  1602 01/16/24  1010 01/16/24  0419   HEMOGLOBIN g/dL 7.7* 7.6* 8.0*   < > 8.1*   HEMATOCRIT % 25.0* 23.9*  --   --  24.0*   PLATELETS Thousands/uL 108* 107*  --   --  129*    < > = values in this interval not displayed.             Imaging: I have personally reviewed pertinent reports.        Previous STRESS TEST:  No results found for this or any previous visit.     No results found for this or any previous visit.    Results for orders placed during the hospital  encounter of 20    NM myocardial perfusion spect (rx stress and/or rest)    [place] [address]  [phone]    Regadenoson    Patient: BONILLA ARROYO  MR number: [id number]  Account number: [id number]  : 1970  Age: 49 years  Gender: Male  Status: Outpatient  Location: Stress lab  Height: 64 in  Weight: 120 lb  BP: 132/ 70 mmHg    Allergies: NO KNOWN ALLERGIES    Diagnosis: V72.81 - PREOP CARDIOVSCLR EXAM    RN:  Madonna Richter RN  Primary Physician:  Dalton Anderson DO  Technician:  Pina Nieves  Referring Physician:  Loi Menezes MD  Interpreting Physician:  Abdulkadir Nieto DO  Group:  [facility]    INDICATIONS: Evaluation of known coronary artery disease.    HISTORY: The patient is a 49 year old  male. Chest pain status: no chest pain. Coronary artery disease risk factors: dyslipidemia and diabetes mellitus. Cardiovascular history: prior myocardial infarction.    REST ECG: Normal sinus rhythm.    PROCEDURE: The study was performed in the the Stress lab. A regadenoson infusion pharmacologic stress test was performed. Systolic blood pressure was 132 mmHg, at the start of the study. Diastolic blood pressure was 70 mmHg, at the start  of the study. The heart rate was 74 bpm, at the start of the study.  Regadenoson protocol:  HR bpm SBP mmHg DBP mmHg  Baseline 74 132 70  2 min 68 122 64  5 min 69 122 66    STRESS SUMMARY: Duration of pharmacologic stress was 3 min. The heart rate response to stress was normal. There was normal resting blood pressure with an appropriate response to stress. There was no chest pain during stress. The stress  test was terminated due to protocol completion. There were no stress arrhythmias or conduction abnormalities. The stress ECG was non-diagnostic due to resting ST/T wave abnormality.    ISOTOPE ADMINISTRATION:  Resting isotope administration Stress  isotope administration  Agent Tetrofosmin Tetrofosmin  Dose 10.3 mCi 32.6 mCi  Date 01/13/2020 01/13/2020  Injection time 08:30 09:42  Imaging time 08:50 --  Injection-image interval 20 min 48 min    The radiopharmaceutical was injected at the peak effect of pharmacologic stress.    MYOCARDIAL PERFUSION IMAGING:  The image quality was good. Rotating projection images reveal mild diaphragmatic attenuation. Left ventricular size was normal. The TID ratio was 1.11.    PERFUSION DEFECTS:  -  There was a moderate-sized, mildly severe, partially reversible myocardial perfusion defect of the basal anterolateral wall.    GATED SPECT:  The calculated left ventricular ejection fraction was 47 %. There was mildly reduced myocardial thickening and motion.    SUMMARY:  -  Stress results: There was no chest pain during stress.  -  ECG conclusions: The stress ECG was non-diagnostic due to resting ST/T wave abnormality.  -  Perfusion imaging: There was a moderate-sized, mildly severe, partially reversible myocardial perfusion defect of the basal anterolateral wall.  -  Gated SPECT: The calculated left ventricular ejection fraction was 47 %.    IMPRESSIONS: Abnormal study after pharmacologic vasodilation. Partially reversible defect basal anterolateral wall representing a small infarct with mild sg-infarct ischemia.    Prepared and signed by    Abdulkadir Nieto DO  Signed 01/13/2020 12:24:56      Previous Cath/PCI:  No results found for this or any previous visit.    No results found for this or any previous visit.    No results found for this or any previous visit.      ECHO:  Results for orders placed during the hospital encounter of 03/19/21    Echo complete with contrast if indicated    [place] [address]  [phone]    Transthoracic Echocardiogram  2D, M-mode, Doppler, and Color Doppler    Study date:  19-Mar-2021    Patient: BONILLA ARROYO  MR  number: [id number]  Account number: [id number]  : 1970  Age: 50 years  Gender: Male  Status: Outpatient  Location: Echo lab  Height: 64 in  Weight: 87.8 lb  BP: 168/ 80 mmHg    Indications: coronary artery disease    Diagnoses: I25.10 - Atherosclerotic heart disease of native coronary artery without angina pectoris    Sonographer:  Siri Escamilla RDCS  Primary Physician:  Dalton Anderson DO  Referring Physician:  Loi Garcia MD  Group:  [facility]  Interpreting Physician:  David Sánchez MD    SUMMARY    LEFT VENTRICLE:  Size was normal.  Systolic function was normal. Ejection fraction was estimated to be 65 %.  There were no regional wall motion abnormalities.  Wall thickness was moderately increased.  There was moderate concentric hypertrophy.    LEFT ATRIUM:  The atrium was moderately dilated.    MITRAL VALVE:  There was mild regurgitation.    TRICUSPID VALVE:  There was mild regurgitation.  Estimated peak PA pressure was 30 mmHg.    HISTORY: PRIOR HISTORY: diabetes mellitus, chronic kidney disease, end stage renal disease, renal transplant, congestive heart failure, hypertension, pulmonary edema, respiratory failure with hypoxia    PROCEDURE: The procedure was performed in the echo lab. This was a routine study. The transthoracic approach was used. The study included complete 2D imaging, M-mode, complete spectral Doppler, and color Doppler. Images were obtained from  the parasternal, apical, subcostal, and suprasternal notch acoustic windows. Image quality was adequate.    LEFT VENTRICLE: Size was normal. Systolic function was normal. Ejection fraction was estimated to be 65 %. There were no regional wall motion abnormalities. Wall thickness was moderately increased. There was moderate concentric  hypertrophy.    RIGHT VENTRICLE: The size was normal. Systolic function was normal. Wall thickness was normal.    LEFT ATRIUM: The atrium was moderately dilated.    RIGHT ATRIUM:  Size was normal.    MITRAL VALVE: Valve structure was normal. There was normal leaflet separation. DOPPLER: The transmitral velocity was within the normal range. There was no evidence for stenosis. There was mild regurgitation.    AORTIC VALVE: The valve was trileaflet. Leaflets exhibited normal thickness and normal cuspal separation. DOPPLER: Transaortic velocity was within the normal range. There was no evidence for stenosis. There was no regurgitation.    TRICUSPID VALVE: The valve structure was normal. There was normal leaflet separation. DOPPLER: The transtricuspid velocity was within the normal range. There was no evidence for stenosis. There was mild regurgitation. Estimated peak PA  pressure was 30 mmHg.    PULMONIC VALVE: Leaflets exhibited normal thickness, no calcification, and normal cuspal separation. DOPPLER: The transpulmonic velocity was within the normal range. There was no regurgitation.    PERICARDIUM: There was no pericardial effusion. The pericardium was normal in appearance.    AORTA: The root exhibited normal size.    SYSTEM MEASUREMENT TABLES    2D  %FS: 33.3 %  Ao Diam: 2.67 cm  EDV(Teich): 71.05 ml  EF(Teich): 62.5 %  ESV(Teich): 26.64 ml  IVSd: 1.39 cm  LA Area: 22.29 cm2  LA Diam: 3.65 cm  LVEDV MOD A4C: 96.68 ml  LVEF MOD A4C: 52.83 %  LVESV MOD A4C: 45.6 ml  LVIDd: 4.03 cm  LVIDs: 2.68 cm  LVLd A4C: 7.28 cm  LVLs A4C: 6.11 cm  LVPWd: 1.03 cm  RA Area: 9.17 cm2  RVIDd: 3.65 cm  RWT: 0.51  SV MOD A4C: 51.08 ml  SV(Teich): 44.4 ml    CW  AV Vmax: 1.43 m/s  AV maxP.24 mmHg  PV Vmax: 1.07 m/s  PV maxP.55 mmHg  TR Vmax: 2.8 m/s  TR maxP.63 mmHg    MM  TAPSE: 1.67 cm    PW  E' Lat: 0.11 m/s  E' Sept: 0.06 m/s  E/E' Lat: 9.96  E/E' Sept: 19.2  LVOT Vmax: 1.11 m/s  LVOT maxP.93 mmHg  MV A [place]: 0.97 m/s  MV Dec [place]: 6.27 m/s2  MV DecT: 173.83 ms  MV E Giancarlo: 1.09 m/s  MV E/A Ratio: 1.12  RVOT Vmax: 0.88 m/s  RVOT maxPG: 3.12 mmHg    [employer] Accredited  Echocardiography Laboratory    Prepared and electronically signed by    David Sánchez MD  Signed 19-Mar-2021 15:55:24    Results for orders placed during the hospital encounter of 08/07/23    Echo complete w/ contrast if indicated    Interpretation Summary    Left Ventricle: Left ventricular cavity size is normal. Wall thickness is severely increased. There is severe concentric hypertrophy. The left ventricular ejection fraction is 60%. Systolic function is normal. Wall motion is normal. Diastolic function is normal.    Right Ventricle: Right ventricular cavity size is normal. Systolic function is normal.    Left Atrium: The atrium is severely dilated (>48 mL/m2).    Tricuspid Valve: The right ventricular systolic pressure is mildly elevated. The estimated right ventricular systolic pressure is 42.00 mmHg.      MELISSA:  No results found for this or any previous visit.    No results found for this or any previous visit.      CMR:  No results found for this or any previous visit.    No results found for this or any previous visit.    No results found for this or any previous visit.      HOLTER  No results found for this or any previous visit.    No results found for this or any previous visit.        Assessment/Plan     Assessment:  53 y.o. male w/ history of ESRD with failed renal transplant on immunosuppressive meds (tacrolimus), known CAD (ostial LAD and ostial Lcx stent), Right AKA 2/2 OM, H/O GIB (nonbleeding h.pylori ulcers causing hgb drop to 2.7 in 2019), H/O cardiac arrest, HTN, HLD, DM2. Pt initially presented with acute hypoxic respiratory failure 2/2 multifocal PNA vs HF, with trop elevation and echo revealing RWMA of LAD territory. LHC shows severe ISR of the ostial LAD/LCX bifurcation stents. Patient had an episode of hematochezia, underwent EGD which showed a duodenal ulcer now s/p clipping. Patient has been on DAPT and heparin gtt since then, tolerating well without bleeding.     Ostial LAD/LCX (LM  equivalent) disease  HFrEF  Shock- septic +/- cardiogenic +/- hemorrhagic  PAF       Plan:  - Plan for Impella-supported LM/LAD/LCX intervention. Risks, benefits, alternatives discussed with the patient, and he agrees to proceed with the procedure  - Vascular study for impella shows CFA of 0.6 cm on both sides. Right side distal EIA is 0.4, but peripheral angiogram of the CFA done with the previous [facility] case appears to be suitable for Impella  - On DAPT  - On heparin gtt  - Daily cbc, cmp  - Consent to be obtained  - NPO @MN        Case discussed and reviewed with Dr. Bloom who agrees with my assessment and plan.    Thank you for involving us in the care of your patient.      Aditya Keith,   Interventional Cardiology Fellow   PGY-7    ==========================================================================================      Epic/ Allscripts/Care Everywhere records reviewed: Yes    ** Please Note: Fluency DirectDictation voice to text software may have been used in the creation of this document. **

## 2024-01-17 NOTE — PROGRESS NOTES
Cardiology Team 2 Progress Note - Berto Faria 53 y.o. male MRN: [id number]  Unit/Bed#: [facility] 515-01 Encounter: [id number]      Subjective:   Patient seen and examined.Overnight, noted to have tenuous hemodynamics, with soft BP in 80s/50s requiring albumin 250ml IV infusion.  Tolerating dual antiplatelet therapy; slight drop in hemoglobin noted, however close to his baseline.  Heparin infusion was initiated this morning at 7 AM.      Plan:  CHIP tentatively scheduled for tomorrow, Thursday, 1/18/2024 with Dr. Bloom; likely IMPELLA assisted   Continue with DAPT  Heparin started this morning, monitor for evidence of recurrence GIB  NPO after midnight. Hold Heparin at 6AM tomorrow, prior to cardiac cath.   CRRT as per nephrology, would greatly appreciate it if patient can be optimized for cath on Thursday.       - VAS limited iliac-femoral eval for IMPELLA:    - Right ilio-femoral: 0.4-0.7 cm    - Left ilio-femoral: 0.4-0.7 cm   - distal abdominal aorta- 1.1 cm       Assessment:  Berto Faria is a 53 y.o. male with a PMH of HFrEF, ESRD, DM1, CAD w/ prior PEA arrest, Hx of R AKA, Afib who presents as an xfer from [employer] for continued management of NSTEMI and cardiogenic and septic shock. S/p LHC showing stent stenosis of Lcx + LAD stents, RCA . Acute drop of Hb to 4.9, s/p 3u pRBC w/ adequate response, due to duodenal bleeding now s/p clip x1 with ovesco clip w/ hemostasis.     Active Problems:    Type 1 diabetes mellitus on insulin therapy ([facility])    NSTEMI (non-ST elevated myocardial infarction) ([facility])    Acidosis    GI bleed w/ Hemorrhagic Shock    S/P AKA (above knee amputation), right ([facility])    Acute on chronic anemia    ESRD (end stage renal disease) ([facility])    Multifocal pneumonia    Acute on chronic diastolic (congestive) heart failure ([facility])    Renal transplant failure and rejection    New onset a-fib ([facility])      Assessment:  1.Shock - septic/cardiogenic/hemorrhagic ; now resolved   - Presented as a  transfer from [employer] as NSTEMI, developed cardio- and distributive- shock  -Status: SCAI stage B-C, warm without evidence of endorgan damage  - TTE(1/11/24): LVIDD 5 cm, eccentric hypertrophy, LVEF 32%, severe global hypokinesis with wall motion abnormalities in anteroseptal, inferior and apical walls.  Mild to moderate MR, mild to moderate TR with eRVSP of 57.   - LHC(1/12/24): Right femoral access- Ostial LAD 95%, ostial circumflex to proximal circumflex 75%, proximal %, mid LAD 60%, distal left main 50%, first marginal 100%.  Severe in-stent restenosis at the bifurcation of ostial LAD/left circumflex; moderately elevated LVEDP  - CABG turn down   - VAS limited iliac-femoral eval for IMPELLA:    - Right ilio-femoral: 0.4-0.7 cm    - Left ilio-femoral: 0.4-0.7 cm   - distal abdominal aorta- 1.1 cm     2.Paroxysmal atrial fibrillation  -   New diagnosis of A-fib, on 1/11, occasional RVR with associated hypotension.  -Currently on Amio drip in normal sinus rhythm    3.Acute HFrEF-   -In setting of NSTEMI and septic shock superimposed on ischemic cardiomyopathy  -Preload: On CRRT  -Afterload: Was taken off pressor support today, will monitor for 24 hours prior to resuming home meds   -Home medication: Coreg 6.25 mg twice daily, isosorbide mononitrate 30 mg daily, metoprolol succinate 50 mg daily, nifedipine 30 mg daily    4.Multifocal pneumonia-resolved  5.Cardiomyopathy - ischemic - EF 30%  6.Acute respiratory failure with hypoxia  7.Type 2 myocardial infarction secondary to #2/4  8.ESRD on HD - current on CRRT  9.Renal transplant failure/rejection   10.Right AKA secondary to osteomyelitis   11.Type 1 diabetes mellitus  12.duodenal ulcer-now status post x 1 ovesco clip(1/13/2024)        Case discussed and reviewed with Dr. Murray who agrees with my assessment and plan.Thank you for involving us in the care of your patient.    Rodger Crespo MD  Cardiology Fellow   PGY-5      Vitals: Blood pressure (!)  "88/50, pulse 71, temperature 98.8 °F (37.1 °C), resp. rate 19, height 5' 4\" (1.626 m), weight 48.9 kg (107 lb 12.9 oz), SpO2 97%., Body mass index is 18.5 kg/m².,   Orthostatic Blood Pressures      Flowsheet Row Most Recent Value   Blood Pressure 88/50 filed at 01/17/2024 0900   Patient Position - Orthostatic VS Sitting filed at 01/17/2024 0700              Intake/Output Summary (Last 24 hours) at 1/17/2024 0921  Last data filed at 1/17/2024 0902  Gross per 24 hour   Intake 1170.06 ml   Output 1227 ml   Net -56.94 ml       Review of System:  Review of system was conducted and was negative except for as stated in the subjective course.    Physical Exam:    GEN: Berto Faria appears well, alert and oriented x 3, pleasant and cooperative   NECK: No JVD or carotid bruits; right HD catheter  HEART: Regular rhythm, normal rate, normal S1 and S2, no murmurs, clicks, gallops or rubs   LUNGS: Clear to auscultation bilaterally; no wheezes, rales, or rhonchi; respiration nonlabored   ABDOMEN:  Normoactive bowel sounds, soft, no tenderness, no distention  EXTREMITIES: Right AKA        Current Facility-Administered Medications:     acetaminophen (TYLENOL) tablet 650 mg, 650 mg, Oral, Q6H PRN, Rosalva Taylor PA-C, 650 mg at 01/15/24 1403    albuterol (PROVENTIL HFA,VENTOLIN HFA) inhaler 2 puff, 2 puff, Inhalation, Q4H PRN, Katie Trottercsek, CRNP    amiodarone tablet 200 mg, 200 mg, Oral, TID With Meals, Katie Grecsek, CRNP, 200 mg at 01/17/24 0735    aspirin chewable tablet 81 mg, 81 mg, Oral, QAM, Katie Rose Mariecsek, CRNP, 81 mg at 01/17/24 0803    atorvastatin (LIPITOR) tablet 80 mg, 80 mg, Oral, Daily With Dinner, Katie Trottercstrae, CRNP, 80 mg at 01/16/24 1704    calcitriol (ROCALTROL) capsule 0.5 mcg, 0.5 mcg, Oral, Once per day on Tuesday Thursday Saturday, GAVINO Baker, 0.5 mcg at 01/16/24 0800    cinacalcet (SENSIPAR) tablet 30 mg, 30 mg, Oral, Daily, GAVINO Baker, 30 mg at 01/17/24 0803    " clopidogrel (PLAVIX) tablet 75 mg, 75 mg, Oral, Daily, Carlie Chan PA-C, 75 mg at 01/17/24 0803    glycerin-hypromellose- (ARTIFICIAL TEARS) ophthalmic solution 1 drop, 1 drop, Right Eye, Q4H PRN, GAVINO Baker    heparin (porcine) 25,000 units in 0.45% NaCl 250 mL infusion (premix), 3-20 Units/kg/hr (Order-Specific), Intravenous, Titrated, Carlie Chan PA-C, Last Rate: 5.4 mL/hr at 01/17/24 0726, 12 Units/kg/hr at 01/17/24 0726    insulin glargine (LANTUS) subcutaneous injection 6 Units 0.06 mL, 6 Units, Subcutaneous, Q12H JAMARCUS, Carlie Chan PA-C, 6 Units at 01/17/24 0803    insulin lispro (HumaLOG) 100 units/mL subcutaneous injection 1-6 Units, 1-6 Units, Subcutaneous, HS, GAVINO Baker, 2 Units at 01/14/24 0216    insulin lispro (HumaLOG) 100 units/mL subcutaneous injection 1-6 Units, 1-6 Units, Subcutaneous, TID With Meals, 1 Units at 01/17/24 0726 **AND** Fingerstick Glucose (POCT), , , TID AC, GAVINO Baker    melatonin tablet 3 mg, 3 mg, Oral, HS, Mya Rios PA-C, 3 mg at 01/16/24 2259    ondansetron (ZOFRAN) injection 4 mg, 4 mg, Intravenous, Q8H PRN, GAVINO Baker, 4 mg at 01/16/24 1330    pantoprazole (PROTONIX) injection 40 mg, 40 mg, Intravenous, Q12H JAMARCUS, Andre Mendoza PA-C, 40 mg at 01/17/24 0501    predniSONE tablet 5 mg, 5 mg, Oral, Daily, GAVINO Baker, 5 mg at 01/17/24 0803    tacrolimus (PROGRAF) capsule 1 mg, 1 mg, Oral, QPM, GAVINO Baker, 1 mg at 01/16/24 1704    tacrolimus (PROGRAF) capsule 2 mg, 2 mg, Oral, QAM, Katie Ellaek, CRNP, 2 mg at 01/17/24 0803    Labs & Results:  Results from last 7 days   Lab Units 01/11/24  2229 01/11/24  2035 01/11/24  1840   HS TNI 0HR ng/L  --   --  11,399*   HS TNI 2HR ng/L  --  10,340*  --    HS TNI 4HR ng/L 9,884*  --   --          Results from last 7 days   Lab Units 01/17/24  0603 01/16/24  2158 01/16/24  1602   POTASSIUM mmol/L 5.0 4.9 4.7   CO2 mmol/L 24 26 27   CHLORIDE mmol/L  103 104 101   BUN mg/dL 23 15 16   CREATININE mg/dL 2.95* 2.30* 2.31*     Results from last 7 days   Lab Units 01/17/24  0441 01/17/24  0030 01/16/24  1602 01/16/24  1010 01/16/24  0419   HEMOGLOBIN g/dL 7.7* 7.6* 8.0*   < > 8.1*   HEMATOCRIT % 25.0* 23.9*  --   --  24.0*   PLATELETS Thousands/uL 108* 107*  --   --  129*    < > = values in this interval not displayed.           Telemetry:   Personally reviewed by Rodger Crespo MD: NSR

## 2024-01-17 NOTE — CASE MANAGEMENT
Case Management Discharge Planning Note    Patient name Berto Faria  Location [facility] 515/[facility] 515-01 MRN [id number]  : 1970 Date 2024       Current Admission Date: 2024  Current Admission Diagnosis:Acute on chronic diastolic (congestive) heart failure ([facility])   Patient Active Problem List    Diagnosis Date Noted    New onset a-fib ([facility]) 2024    Hx of AKA (above knee amputation) ([facility]) 2024    Renal transplant failure and rejection 2024    Acute on chronic diastolic (congestive) heart failure ([facility]) 10/10/2022    Vasovagal syncope 10/01/2022    Elevated MCV 02/10/2021    Hyperbilirubinemia 02/10/2021    Acute on chronic diastolic CHF (congestive heart failure) ([facility]) 02/10/2021    Elevated procalcitonin 02/10/2021    Mitral valve insufficiency 02/10/2021    Abnormal EKG 02/10/2021    Multifocal pneumonia 2020    Community acquired pneumonia of right lower lobe of lung 2020    Pancreatic lesion 2020    Abnormal CT scan, colon 2020    Chronic kidney disease, unspecified 01/15/2020    Coronary artery disease involving native coronary artery of native heart without angina pectoris 2019    Pre-operative cardiovascular examination 2019    ESRD (end stage renal disease) ([facility]) 2019    Acute pulmonary edema ([facility]) 10/20/2019    Acute on chronic anemia 10/14/2019    Pulmonary hypertension ([facility]) 10/13/2019    Acute blood loss anemia 2019    S/P AKA (above knee amputation), right ([facility]) 2019    Depressed left ventricular ejection fraction 2019    Acidosis 2018    Hyperphosphatemia 2018    Hyponatremia 2018    GI bleed w/ Hemorrhagic Shock 2018    Severe sepsis ([facility]) 2018    Urinary retention 2017    Chronic osteomyelitis of tibia ([facility]) 2017    Elevated troponin 2017    Diarrhea 2017    Cellulitis of ankle 2017    NSTEMI (non-ST elevated myocardial infarction) ([facility]) 2017     Closed fracture of distal end of right tibia with routine healing 07/03/2017    Osteomyelitis ([facility]) 12/07/2016    Poor circulation 12/07/2016    Type 1 diabetes mellitus on insulin therapy ([facility]) 10/26/2016    Renal transplant, status post 10/26/2016    Hyperkalemia 10/26/2016    Immunosuppression ([facility]) 11/04/2014    Hypertension 08/04/2010      LOS (days): 9  Geometric Mean LOS (GMLOS) (days): 5.1  Days to GMLOS:-3.9     OBJECTIVE:  Risk of Unplanned Readmission Score: 23.64         Current admission status: Inpatient   [facility]   [id number] [address] [address]  Phone: [phone] Fax: [phone]    Primary Care Provider: Dalton Anderson DO    Primary Insurance: MEDICARE  Secondary Insurance: CAPITAL    DISCHARGE DETAILS:    Pt remains in critical care, not medically ready for discharge. CM will continue to follow and assist w/ any dispo needs.

## 2024-01-17 NOTE — ASSESSMENT & PLAN NOTE
Acute blood loss anemia  with hemorrhagic shock present on 1/13 with large melanotic stools.  Patient does have history of GI bleed and previous hospitalization, [facility] intervention in [facility]  Received 3 units pRBC  1/14 EGD/flex sig: Significant amount of blood in the descending colon, sigmoid colon, and rectum.  No ischemic changes identified.  Single cratered ulcer in the first part of the duodenum with visible vessel, clips applied.  Plan:  Continue Protonix 40 twice daily  Continue to monitor frequent hemoglobin  Continue monitor hemodynamics, consider transfusion/hemoglobin recheck with any decline  GI following

## 2024-01-17 NOTE — PROGRESS NOTES
NEPHROLOGY PROGRESS NOTE   Berto Faria 53 y.o. male MRN: [id number]  Unit/Bed#: [facility] 515-01 Encounter: [id number]      ASSESSMENT & PLAN:  #1 ESRD on HD TTS at [facility].  Patient was transitioned to CRRT due to hypotension in the setting of rapid A-fib.  CRRT was discontinued overnight after filter clotted  Plan for HD tomorrow following outpatient schedule, continue UF as tolerated for low blood pressure    #2 new onset rapid A-fib, cardiology on board    #3  Recent shock, suspected septic versus cardiogenic as well as GI bleeding, acute HFrEF, currently off Levophed since 01/15  Blood pressure stable in the lower side    #4 anemia in the setting of ESRD as well as GI bleeding with hematochezia status post EGD and flex sigmoidoscopy by GI status post clipping of duodenal ulcer.  Status post transfusion.  Monitor H&H, noted hemoglobin decreasing to 7.7 this morning    #5 history of failed kidney transplant, continue immunosuppression, it should be titrating down to off as per his outpatient nephrologist    #6 type II MI status post cardiac cath with in-stent restenosis of left circumflex and LAD stents, cardiology on board, plan for Impella guided PCI of the distal left main into the LAD and circumflex plan tentatively on 1/18    Recommendations were discussed with critical care team, off CRRT now, plan for HD tomorrow following outpatient schedule, continue UF as tolerated for low blood pressure, they agree with the plan      SUBJECTIVE:  Patient seen and examined this morning, CRRT was discontinued overnight after filter clotted, currently denies any complaints, no chest pain, no shortness of breath, no nausea, no vomiting.  Hypotensive overnight received 250 cc of albumin.      OBJECTIVE:  Current Weight: Weight - Scale: 48.9 kg (107 lb 12.9 oz)  Vitals:    01/17/24 0930   BP: (!) 82/49   Pulse: 66   Resp:    Temp:    SpO2: 91%       Intake/Output Summary (Last 24 hours) at 1/17/2024 1109  Last data  filed at 1/17/2024 0902  Gross per 24 hour   Intake 1163.06 ml   Output 1006 ml   Net 157.06 ml       General: conscious, cooperative, in not acute distress  Eyes: conjunctivae pale, anicteric sclerae  ENT: lips and mucous membranes moist  Neck: supple, no JVD  Chest: Few rales at bases, no ronchus or wheezings  CVS: distinct S1 & S2, normal rate, regular rhythm  Abdomen: soft, non-tender, non-distended, normoactive bowel sounds  Extremities: No significant edema of both legs, right AKA  Skin: no rash  Neuro: awake, alert, oriented  Temporary HD line in place          Medications:    Current Facility-Administered Medications:     acetaminophen (TYLENOL) tablet 650 mg, 650 mg, Oral, Q6H PRN, Rosalva Taylor PA-C, 650 mg at 01/15/24 1403    albuterol (PROVENTIL HFA,VENTOLIN HFA) inhaler 2 puff, 2 puff, Inhalation, Q4H PRN, Katie Velasco, CRNP    amiodarone tablet 200 mg, 200 mg, Oral, TID With Meals, Katie Velasco, CRNP, 200 mg at 01/17/24 0735    aspirin chewable tablet 81 mg, 81 mg, Oral, QAM, Katie Velasco, CRNP, 81 mg at 01/17/24 0803    atorvastatin (LIPITOR) tablet 80 mg, 80 mg, Oral, Daily With Dinner, Katie Rose Mariecsek, CRNP, 80 mg at 01/16/24 1704    calcitriol (ROCALTROL) capsule 0.5 mcg, 0.5 mcg, Oral, Once per day on Tuesday Thursday Saturday, Katie Velasco, CRNP, 0.5 mcg at 01/16/24 0800    cinacalcet (SENSIPAR) tablet 30 mg, 30 mg, Oral, Daily, Katie Velasco CRNP, 30 mg at 01/17/24 0803    clopidogrel (PLAVIX) tablet 75 mg, 75 mg, Oral, Daily, Carlie Chan PA-C, 75 mg at 01/17/24 0803    glycerin-hypromellose- (ARTIFICIAL TEARS) ophthalmic solution 1 drop, 1 drop, Right Eye, Q4H PRN, GAVINO Baker    heparin (porcine) 25,000 units in 0.45% NaCl 250 mL infusion (premix), 3-20 Units/kg/hr (Order-Specific), Intravenous, Titrated, Carlie Chan PA-C, Last Rate: 5.4 mL/hr at 01/17/24 0726, 12 Units/kg/hr at 01/17/24 0726    insulin glargine (LANTUS) subcutaneous injection 6  Units 0.06 mL, 6 Units, Subcutaneous, Q12H JAMARCUS, Carlie Chan PA-C, 6 Units at 01/17/24 0803    insulin lispro (HumaLOG) 100 units/mL subcutaneous injection 1-6 Units, 1-6 Units, Subcutaneous, HS, Katie Jaramilloek, CRNP, 2 Units at 01/14/24 0216    insulin lispro (HumaLOG) 100 units/mL subcutaneous injection 1-6 Units, 1-6 Units, Subcutaneous, TID With Meals, 1 Units at 01/17/24 0726 **AND** Fingerstick Glucose (POCT), , , TID AC, Katie Jaramilloek, CRNP    melatonin tablet 3 mg, 3 mg, Oral, HS, Mya Rios PA-C, 3 mg at 01/16/24 2259    ondansetron (ZOFRAN) injection 4 mg, 4 mg, Intravenous, Q8H PRN, Katie Velasco, CRNP, 4 mg at 01/16/24 1330    pantoprazole (PROTONIX) injection 40 mg, 40 mg, Intravenous, Q12H JAMARCUS, Andre Mendoza PA-C, 40 mg at 01/17/24 0501    predniSONE tablet 5 mg, 5 mg, Oral, Daily, Katie Trottercstrae, CRNP, 5 mg at 01/17/24 0803    tacrolimus (PROGRAF) capsule 1 mg, 1 mg, Oral, QPM, Katie Rose Mariecsek, CRNP, 1 mg at 01/16/24 1704    tacrolimus (PROGRAF) capsule 2 mg, 2 mg, Oral, QAM, Katie Rose Mariecsek, CRNP, 2 mg at 01/17/24 0803    Invasive Devices:        Lab Results:   Results from last 7 days   Lab Units 01/17/24  0603 01/17/24  0441 01/17/24  0030 01/16/24  2158 01/16/24  1602 01/16/24  1010 01/16/24  0419 01/13/24  0901 01/13/24  0243 01/12/24  0844 01/12/24  0431   WBC Thousand/uL  --  11.10* 11.20*  --   --   --  15.76*   < >  --    < > 10.09   HEMOGLOBIN g/dL  --  7.7* 7.6*  --  8.0*   < > 8.1*   < >  --    < > 7.8*   HEMATOCRIT %  --  25.0* 23.9*  --   --   --  24.0*   < >  --    < > 22.9*   PLATELETS Thousands/uL  --  108* 107*  --   --   --  129*   < >  --    < > 198   SODIUM mmol/L 135  --   --  136 133*   < >  --    < >  --    < >  --    POTASSIUM mmol/L 5.0  --   --  4.9 4.7   < >  --    < >  --    < >  --    CHLORIDE mmol/L 103  --   --  104 101   < >  --    < >  --    < >  --    CO2 mmol/L 24  --   --  26 27   < >  --    < >  --    < >  --    BUN mg/dL 23  --   --  15 16   < >  " --    < >  --    < >  --    CREATININE mg/dL 2.95*  --   --  2.30* 2.31*   < >  --    < >  --    < >  --    CALCIUM mg/dL 8.4  --   --  8.4 8.9   < >  --    < >  --    < >  --    MAGNESIUM mg/dL 2.2  --   --  2.2 2.3   < >  --    < >  --    < > 2.1   PHOSPHORUS mg/dL 4.0  --   --  3.0 2.5*   < >  --    < >  --    < > 2.8   ALK PHOS U/L  --   --   --   --   --   --   --   --  56  --  57   ALT U/L  --   --   --   --   --   --   --   --  13  --  12   AST U/L  --   --   --   --   --   --   --   --  19  --  18    < > = values in this interval not displayed.       Previous work up:  See previous notes      Portions of the record may have been created with voice recognition software. Occasional wrong word or \"sound a like\" substitutions may have occurred due to the inherent limitations of voice recognition software. Read the chart carefully and recognize, using context, where substitutions have occurred.If you have any questions, please contact the dictating provider.  "

## 2024-01-17 NOTE — ASSESSMENT & PLAN NOTE
Lab Results   Component Value Date    HGBA1C 7.7 (H) 01/07/2024    HGBA1C 6.5 (H) 02/23/2018       Plan:   Continue AC/HS sliding scale algorithm   Lantus 6 units q12h   Adjust insulin regimen as needed to maintain goal -180  Carb controlled diet   Patient intermittently non-complaint with Insulin

## 2024-01-17 NOTE — ASSESSMENT & PLAN NOTE
Presented 23 to [facility] with new oxygen requirement, fatigue   Relevant imagin/7/24 CT Chest: Bilateral multilevel airspace consolidation, groundglass opacities, septal thickening and small bilateral pleural effusions secondary to volume overloaded as well as pneumonia.  Initial procalcitonin: 3.16 (24)  Possibly falsely elevated due to ESRD, but had lower levels in the past   Cultures:  24 BCXx2: No growth  23 COVID/Flu/RSV: Negative   23 RP2: Negative   24 MRSA Cx: Negative    24 BCXx2: No growth     Plan:   Completed Cefepime Course   Continue to monitor fever and WBC curve

## 2024-01-17 NOTE — OCCUPATIONAL THERAPY NOTE
Occupational Therapy refusal        Patient Name: Berto Faria  Today's Date: 1/17/2024 01/17/24 1327   OT Last Visit   OT Visit Date 01/17/24   Note Type   Note Type Treatment   Cancel Reasons Refusal         DAMION Adames, OTR/L

## 2024-01-17 NOTE — ASSESSMENT & PLAN NOTE
Dialysis regimen: Tuesday, Thursday, Saturday via right arm AV fistula   1/8/24: Right IJ temporary HD catheter placement   1/8/24: CRRT initiated   1/9/24: Tolerated -100 to -150 cc/hr volume removal   Re-initiation of CRRT 1/11-1/17    Plan:   Plan to transition back to IHD  Cinacalcet 30mg daily   Nephrology consulted, appreciate recommendations

## 2024-01-17 NOTE — PROGRESS NOTES
[facility]  Progress Note  Name: Berto Faria I  MRN: [id number]  Unit/Bed#: [facility] 515-01 I Date of Admission: 1/8/2024   Date of Service: 1/17/2024 I Hospital Day: 9    Assessment/Plan   NSTEMI (non-ST elevated myocardial infarction) (HCC)  Assessment & Plan  Presented to [facility] ED with shortness of breath. Troponin and EKG checked as part of initial workup. EKG without acute ischemic changes   NSTEMI likely related to increased demand from acute hypoxic respiratory failure and septic shock   Troponin 40280 > 49278 > 66260   Cardiac Cath 1/12:Severe in-stent restenosis bifurcation ostial LAD/LCx,  RCA, elevated LVEDP, porcelain aorta.  CT Surgery Consulted-recommendations for supportive care or high risk PCI    Plan:  ASA 81mg daily, Plavix 75 Daily   Atorvastatin 80mg qHS   Continue holding home afterload reduction agents/AV aubrie blockers given boarderline Blood Pressure    Carvedilol 6.25mg BID  Isosorbide mononitrate 30mg q24h   Metoprolol succinate 50mg q24  Nifedipine 30mg q24 hours   Cardiology consulted, appreciate recommendations- High risk PCI tentatively planned for 1/18    GI bleed w/ Hemorrhagic Shock  Assessment & Plan  Acute blood loss anemia  with hemorrhagic shock present on 1/13 with large melanotic stools.  Patient does have history of GI bleed and previous hospitalization, required intervention in 2019 at [facility]  Received 3 units pRBC  1/14 EGD/flex sig: Significant amount of blood in the descending colon, sigmoid colon, and rectum.  No ischemic changes identified.  Single cratered ulcer in the first part of the duodenum with visible vessel, clips applied.  Plan:  Continue Protonix 40 twice daily  Continue to monitor frequent hemoglobin  Continue monitor hemodynamics, consider transfusion/hemoglobin recheck with any decline  GI following    New onset a-fib (HCC)  Assessment & Plan  New-onset in AM of 1/11. With occasional RVR with associated  hypotension. Amiodarone bolus x1 given at RRT while in dialysis  AM.    Plan:  Currently NSR  Amiodarone 200 TID  Plan to start heparin drip today    Acute on chronic diastolic (congestive) heart failure (HCC)  Assessment & Plan  Likely related to NSTEMI from septic shock and element of stress cardiomyopathy with  possible ischemic element given regional wall changes   24 TTE: LV cavity size is normal. Wall thickness is mildly increased. LVEF 40%. Systolic function is moderately reduced. Following segments are hypokinetic: mid anteroseptal, mid inferior, apical anterior, apical septal, apical inferior and apex.RV normal. Mild MR. Mild TR    Plan:   Volume removal with HD  Daily weights  Continue holding home afterload reduction agents/AV aubrie blockers given resolving septic/ cardiogenic/ hemorrhagic  shock:   Carvedilol 6.25mg BID  Isosorbide mononitrate 30mg q24h   Metoprolol succinate 50mg q24  Nifedipine 30mg q24 hours   Meds as above for NSTEMI  Cardiology consulted, appreciate recommendations      Multifocal pneumonia  Assessment & Plan  Presented 23 to [facility] with new oxygen requirement, fatigue   Relevant imagin/7/24 CT Chest: Bilateral multilevel airspace consolidation, groundglass opacities, septal thickening and small bilateral pleural effusions secondary to volume overloaded as well as pneumonia.  Initial procalcitonin: 3.16 (24)  Possibly falsely elevated due to ESRD, but had lower levels in the past   Cultures:  24 BCXx2: No growth  23 COVID/Flu/RSV: Negative   23 RP2: Negative   24 MRSA Cx: Negative    24 BCXx2: No growth     Plan:   Completed Cefepime Course   Continue to monitor fever and WBC curve       ESRD (end stage renal disease) ([facility])  Assessment & Plan  Dialysis regimen: Tuesday, Thursday, Saturday via right arm AV fistula   24: Right IJ temporary HD catheter placement   24: CRRT initiated   24: Tolerated -100 to -150 cc/hr volume removal    Re-initiation of CRRT 1/11-1/17    Plan:   Plan to transition back to IHD  Cinacalcet 30mg daily   Nephrology consulted, appreciate recommendations    Type 1 diabetes mellitus on insulin therapy (HCC)  Assessment & Plan  Lab Results   Component Value Date    HGBA1C 7.7 (H) 01/07/2024    HGBA1C 6.5 (H) 02/23/2018       Plan:   Continue AC/HS sliding scale algorithm   Lantus 6 units q12h   Adjust insulin regimen as needed to maintain goal -180  Carb controlled diet   Patient intermittently non-complaint with Insulin     Renal transplant failure and rejection  Assessment & Plan  History of renal transplant chronically on tacrolimus 1 mg p.o. nightly, 2 mg p.o. every morning, AZA 50 daily, Prednisone 5 Daily    Plan:  Continue tacrolimus 2mg qAM, 1mg qPM  Prednisone 5 mg qD  Holding home azathioprine 50mg daily   Nephrology consulted, appreciate recommendations   Reach out to transplant center for update regarding these medications    Acute on chronic anemia  Assessment & Plan  Secondary to chronic disease and ESRD  Baseline hemoglobin: 9.6 - 11.8  Acute hypotension anemia on 1/13 requiring transfusion  Blood products:  1/13: 2 unit PRBC CMV negative, irradiated  1/14 1u pRBC    Plan:   Transfuse as indicated in the setting of hemodynamic instability or signs of active bleeding  Monitor Frequent Hg  Plan as above due to GIB    S/P AKA (above knee amputation), right (HCC)  Assessment & Plan  Secondary to chronic osteomyelitis     Plan:   PT/OT when appropriate   Offloading and frequent turns              Disposition: Critical care    ICU Core Measures     A: Assess, Prevent, and Manage Pain Has pain been assessed? Yes  Need for changes to pain regimen? No   B: Both SAT/SAT  N/A   C: Choice of Sedation RASS Goal: N/A patient not on sedation  Need for changes to sedation or analgesia regimen? No   D: Delirium CAM-ICU: Negative   E: Early Mobility  Plan for early mobility? Yes   F: Family Engagement Plan for family  engagement today? Yes       Review of Invasive Devices:      Central access plan: HD cath in place.  Plan maintain      Prophylaxis:  VTE Contraindicated secondary to: GI Bleed   Stress Ulcer  covered bypantoprazole (PROTONIX) 40 mg tablet [[id number]] (Long-Term Med), pantoprazole (PROTONIX) injection 40 mg [[id number]]         Significant 24hr Events     24hr events: Hypotensive overnight - received 250 ml albumin with mild response. Ran CRRT even but filter then clotted. Unable to return all of the blood.      Subjective   Review of Systems   Respiratory:  Negative for shortness of breath.    Cardiovascular:  Positive for leg swelling. Negative for chest pain.   Gastrointestinal:  Negative for abdominal pain and nausea.      Objective                            Vitals I/O      Most Recent Min/Max in 24hrs   Temp 99.3 °F (37.4 °C) Temp  Min: 98.1 °F (36.7 °C)  Max: 99.7 °F (37.6 °C)   Pulse 72 Pulse  Min: 68  Max: 83   Resp 17 Resp  Min: 17  Max: 38   BP 91/50 BP  Min: 82/52  Max: 104/61   O2 Sat 100 % SpO2  Min: 95 %  Max: 100 %      Intake/Output Summary (Last 24 hours) at 1/17/2024 0330  Last data filed at 1/17/2024 0000  Gross per 24 hour   Intake 1063 ml   Output 1611 ml   Net -548 ml       Diet Surgical; Surgical Soft/Lite Meal; Consistent Carbohydrate Diet Level 3 (6 carb servings/90 grams CHO/meal)    Invasive Monitoring           Physical Exam   Physical Exam  Skin:     General: Skin is warm and dry.      Capillary Refill: Capillary refill takes 2 to 3 seconds.   HENT:      Head: Atraumatic.      Mouth/Throat:      Mouth: Mucous membranes are moist.   Neck:      Vascular: Central line present.   Cardiovascular:      Rate and Rhythm: Normal rate and regular rhythm.      Comments: Generalized edema  Abdominal: General: Bowel sounds are normal. There is no distension.      Palpations: Abdomen is soft.      Tenderness: There is no abdominal tenderness.   Constitutional:       General: He is not in acute  distress.  Pulmonary:      Effort: No respiratory distress.      Breath sounds: No wheezing, rhonchi or rales.   Neurological:      General: No focal deficit present.      Mental Status: He is alert and oriented to person, place and time.            Diagnostic Studies           Medications:  Scheduled PRN   amiodarone, 200 mg, TID With Meals  aspirin, 81 mg, QAM  atorvastatin, 80 mg, Daily With Dinner  calcitriol, 0.5 mcg, Once per day on Tuesday Thursday Saturday  cinacalcet, 30 mg, Daily  clopidogrel, 75 mg, Daily  insulin glargine, 6 Units, Q12H JAMARCUS  insulin lispro, 1-6 Units, HS  insulin lispro, 1-6 Units, TID With Meals  melatonin, 3 mg, HS  pantoprazole, 40 mg, Q12H JAMARCUS  predniSONE, 5 mg, Daily  tacrolimus, 1 mg, QPM  tacrolimus, 2 mg, QAM      acetaminophen, 650 mg, Q6H PRN  albuterol, 2 puff, Q4H PRN  glycerin-hypromellose-, 1 drop, Q4H PRN  ondansetron, 4 mg, Q8H PRN       Continuous    NxStage K 4/Ca 3, 20,000 mL         Labs:    CBC    Recent Labs     01/16/24  0419 01/16/24  1010 01/16/24  1602 01/17/24  0030   WBC 15.76*  --   --  11.20*   HGB 8.1*   < > 8.0* 7.6*   HCT 24.0*  --   --  23.9*   *  --   --  107*    < > = values in this interval not displayed.     BMP    Recent Labs     01/16/24  1602 01/16/24  2158   SODIUM 133* 136   K 4.7 4.9    104   CO2 27 26   AGAP 5 6   BUN 16 15   CREATININE 2.31* 2.30*   CALCIUM 8.9 8.4       Coags    No recent results     Additional Electrolytes  Recent Labs     01/16/24  1602 01/16/24  2158   MG 2.3 2.2   PHOS 2.5* 3.0   CAIONIZED 1.21 1.15          Blood Gas    No recent results  No recent results LFTs  No recent results    Infectious  No recent results  Glucose  Recent Labs     01/16/24  0406 01/16/24  1010 01/16/24  1602 01/16/24  2158   GLUC 73 166* 107 79                   KEVAN CliftonC

## 2024-01-17 NOTE — ASSESSMENT & PLAN NOTE
New-onset in AM of 1/11. With occasional RVR with associated hypotension. Amiodarone bolus x1 given at RRT while in dialysis 1/11 AM.    Plan:  Currently NSR  Amiodarone 200 TID  Plan to start heparin drip today

## 2024-01-17 NOTE — WOUND OSTOMY CARE
Consult Note - Wound   Berto Faria 53 y.o. male MRN: [id number]  Unit/Bed#: [facility] 515-01 Encounter: [id number]        History and Present Illness:  Patient is 52 yo male admitted to [facility] with ESRD. Patient has history of HTN, DM2, HLD, and CAD. Patient is incontinent of bowel and bladder. Patient is mod assist with turning from side to side for assessment. Patient is independent with meals.     Assessment Findings:   Sacral/buttocks and B/L heels intact and blanching, preventative skin care orders placed.     Left arm wound- scattered blistered areas, tape irritation vs blisters from edema, partial thickness skin loss with pink tissue, scant serous drainage.    No induration, fluctuance, odor, warmth/temperature differences, redness, or purulence noted to the above noted wounds and skin areas assessed. New dressings applied per orders listed below. Patient tolerated well- no s/s of non-verbal pain or discomfort observed during the encounter. Bedside nurse aware of plan of care. See flow sheets for more detailed assessment findings. Wound care will continue to follow.     Skin care plans:  1-Hydraguard to bilateral sacrum, buttock and heels BID and PRN  2-Elevate heels to offload pressure.  3-Ehob cushion in chair when out of bed.  4-Moisturize skin daily with skin nourishing cream.  5-Turn/reposition q2h or when medically stable for pressure re-distribution on skin.   6-Cleanse left arm with normal saline, apply adaptic to wound bed, cover with ABD, secure with kashif and tape. Change every other day and PRN soilage/displacement.     Wounds:  Wound 01/15/24 Arm Anterior;Distal;Left;Upper (Active)   Wound Image    01/17/24 1556   Wound Description Pink;Fragile 01/17/24 1600   Jessica-wound Assessment Clean;Dry;Intact 01/17/24 1600   Wound Length (cm) 14 cm 01/17/24 1556   Wound Width (cm) 5 cm 01/17/24 1556   Wound Depth (cm) 0.1 cm 01/17/24 1556   Wound Surface Area (cm^2) 70 cm^2 01/17/24 1556   Wound Volume (cm^3)  7 cm^3 01/17/24 1556   Calculated Wound Volume (cm^3) 7 cm^3 01/17/24 1556   Wound Site Closure CHAZ 01/17/24 1600   Drainage Amount Scant 01/17/24 1600   Drainage Description Serous 01/17/24 1600   Non-staged Wound Description Partial thickness 01/17/24 1600   Treatments Cleansed 01/17/24 1600   Dressing ABD;Dry dressing;Vaseline gauze 01/17/24 1600   Wound packed? No 01/17/24 1600   Packing- # removed 0 01/17/24 1600   Packing- # inserted 0 01/17/24 1600   Dressing Changed New 01/17/24 1600   Patient Tolerance Tolerated well 01/17/24 1600   Dressing Status Clean;Dry;Intact 01/17/24 1600               Helen Navarro BSN, RN, CWOCN

## 2024-01-18 ENCOUNTER — APPOINTMENT (INPATIENT)
Dept: DIALYSIS | Facility: HOSPITAL | Age: 54
DRG: 853 | End: 2024-01-18
Payer: MEDICARE

## 2024-01-18 LAB
ABO GROUP BLD BPU: NORMAL
ANION GAP SERPL CALCULATED.3IONS-SCNC: 10 MMOL/L
APTT PPP: 87 SECONDS (ref 23–37)
ATRIAL RATE: 56 BPM
ATRIAL RATE: 62 BPM
BPU ID: NORMAL
BUN SERPL-MCNC: 40 MG/DL (ref 5–25)
CA-I BLD-SCNC: 1.04 MMOL/L (ref 1.12–1.32)
CALCIUM SERPL-MCNC: 7.5 MG/DL (ref 8.4–10.2)
CHLORIDE SERPL-SCNC: 101 MMOL/L (ref 96–108)
CO2 SERPL-SCNC: 23 MMOL/L (ref 21–32)
CREAT SERPL-MCNC: 4.55 MG/DL (ref 0.6–1.3)
CROSSMATCH: NORMAL
ERYTHROCYTE [DISTWIDTH] IN BLOOD BY AUTOMATED COUNT: 22 % (ref 11.6–15.1)
GFR SERPL CREATININE-BSD FRML MDRD: 13 ML/MIN/1.73SQ M
GLUCOSE SERPL-MCNC: 109 MG/DL (ref 65–140)
GLUCOSE SERPL-MCNC: 141 MG/DL (ref 65–140)
GLUCOSE SERPL-MCNC: 157 MG/DL (ref 65–140)
GLUCOSE SERPL-MCNC: 181 MG/DL (ref 65–140)
GLUCOSE SERPL-MCNC: 77 MG/DL (ref 65–140)
HCT VFR BLD AUTO: 32.3 % (ref 36.5–49.3)
HGB BLD-MCNC: 10.5 G/DL (ref 12–17)
MAGNESIUM SERPL-MCNC: 2.4 MG/DL (ref 1.9–2.7)
MCH RBC QN AUTO: 33.1 PG (ref 26.8–34.3)
MCHC RBC AUTO-ENTMCNC: 32.5 G/DL (ref 31.4–37.4)
MCV RBC AUTO: 102 FL (ref 82–98)
P AXIS: 64 DEGREES
P AXIS: 68 DEGREES
PHOSPHATE SERPL-MCNC: 4 MG/DL (ref 2.7–4.5)
PLATELET # BLD AUTO: 185 THOUSANDS/UL (ref 149–390)
PMV BLD AUTO: 11.2 FL (ref 8.9–12.7)
POTASSIUM SERPL-SCNC: 5 MMOL/L (ref 3.5–5.3)
PR INTERVAL: 172 MS
PR INTERVAL: 174 MS
QRS AXIS: 0 DEGREES
QRS AXIS: 106 DEGREES
QRSD INTERVAL: 138 MS
QRSD INTERVAL: 168 MS
QT INTERVAL: 502 MS
QT INTERVAL: 576 MS
QTC INTERVAL: 509 MS
QTC INTERVAL: 555 MS
RBC # BLD AUTO: 3.17 MILLION/UL (ref 3.88–5.62)
SODIUM SERPL-SCNC: 134 MMOL/L (ref 135–147)
T WAVE AXIS: 134 DEGREES
T WAVE AXIS: 268 DEGREES
UNIT DISPENSE STATUS: NORMAL
UNIT PRODUCT CODE: NORMAL
UNIT PRODUCT VOLUME: 350 ML
UNIT RH: NORMAL
VENTRICULAR RATE: 56 BPM
VENTRICULAR RATE: 62 BPM
WBC # BLD AUTO: 15.12 THOUSAND/UL (ref 4.31–10.16)

## 2024-01-18 PROCEDURE — C1725 CATH, TRANSLUMIN NON-LASER: HCPCS | Performed by: INTERNAL MEDICINE

## 2024-01-18 PROCEDURE — 5A1D90Z PERFORMANCE OF URINARY FILTRATION, CONTINUOUS, GREATER THAN 18 HOURS PER DAY: ICD-10-PCS | Performed by: INTERNAL MEDICINE

## 2024-01-18 PROCEDURE — C1887 CATHETER, GUIDING: HCPCS | Performed by: INTERNAL MEDICINE

## 2024-01-18 PROCEDURE — 99232 SBSQ HOSP IP/OBS MODERATE 35: CPT | Performed by: INTERNAL MEDICINE

## 2024-01-18 PROCEDURE — C1769 GUIDE WIRE: HCPCS | Performed by: INTERNAL MEDICINE

## 2024-01-18 PROCEDURE — C1894 INTRO/SHEATH, NON-LASER: HCPCS | Performed by: INTERNAL MEDICINE

## 2024-01-18 PROCEDURE — C1760 CLOSURE DEV, VASC: HCPCS | Performed by: INTERNAL MEDICINE

## 2024-01-18 PROCEDURE — 33990 INSJ PERQ VAD L HRT ARTERIAL: CPT | Performed by: INTERNAL MEDICINE

## 2024-01-18 PROCEDURE — 99152 MOD SED SAME PHYS/QHP 5/>YRS: CPT | Performed by: INTERNAL MEDICINE

## 2024-01-18 PROCEDURE — 90935 HEMODIALYSIS ONE EVALUATION: CPT | Performed by: INTERNAL MEDICINE

## 2024-01-18 PROCEDURE — 82330 ASSAY OF CALCIUM: CPT | Performed by: PHYSICIAN ASSISTANT

## 2024-01-18 PROCEDURE — C9113 INJ PANTOPRAZOLE SODIUM, VIA: HCPCS | Performed by: PHYSICIAN ASSISTANT

## 2024-01-18 PROCEDURE — C1753 CATH, INTRAVAS ULTRASOUND: HCPCS | Performed by: INTERNAL MEDICINE

## 2024-01-18 PROCEDURE — 93005 ELECTROCARDIOGRAM TRACING: CPT

## 2024-01-18 PROCEDURE — 85347 COAGULATION TIME ACTIVATED: CPT

## 2024-01-18 PROCEDURE — 85027 COMPLETE CBC AUTOMATED: CPT | Performed by: PHYSICIAN ASSISTANT

## 2024-01-18 PROCEDURE — 92978 ENDOLUMINL IVUS OCT C 1ST: CPT | Performed by: INTERNAL MEDICINE

## 2024-01-18 PROCEDURE — 82948 REAGENT STRIP/BLOOD GLUCOSE: CPT

## 2024-01-18 PROCEDURE — B2111ZZ FLUOROSCOPY OF MULTIPLE CORONARY ARTERIES USING LOW OSMOLAR CONTRAST: ICD-10-PCS | Performed by: INTERNAL MEDICINE

## 2024-01-18 PROCEDURE — 80048 BASIC METABOLIC PNL TOTAL CA: CPT | Performed by: PHYSICIAN ASSISTANT

## 2024-01-18 PROCEDURE — 4A023N7 MEASUREMENT OF CARDIAC SAMPLING AND PRESSURE, LEFT HEART, PERCUTANEOUS APPROACH: ICD-10-PCS | Performed by: INTERNAL MEDICINE

## 2024-01-18 PROCEDURE — 92928 PRQ TCAT PLMT NTRAC ST 1 LES: CPT | Performed by: INTERNAL MEDICINE

## 2024-01-18 PROCEDURE — 85730 THROMBOPLASTIN TIME PARTIAL: CPT | Performed by: ANESTHESIOLOGY

## 2024-01-18 PROCEDURE — 83735 ASSAY OF MAGNESIUM: CPT | Performed by: PHYSICIAN ASSISTANT

## 2024-01-18 PROCEDURE — C9600 PERC DRUG-EL COR STENT SING: HCPCS | Performed by: INTERNAL MEDICINE

## 2024-01-18 PROCEDURE — 027135Z DILATION OF CORONARY ARTERY, TWO ARTERIES WITH TWO DRUG-ELUTING INTRALUMINAL DEVICES, PERCUTANEOUS APPROACH: ICD-10-PCS | Performed by: INTERNAL MEDICINE

## 2024-01-18 PROCEDURE — 02HA3RZ INSERTION OF SHORT-TERM EXTERNAL HEART ASSIST SYSTEM INTO HEART, PERCUTANEOUS APPROACH: ICD-10-PCS | Performed by: INTERNAL MEDICINE

## 2024-01-18 PROCEDURE — 92972 PERQ TRLUML CORONRY LITHOTRP: CPT | Performed by: INTERNAL MEDICINE

## 2024-01-18 PROCEDURE — C9113 INJ PANTOPRAZOLE SODIUM, VIA: HCPCS

## 2024-01-18 PROCEDURE — 99153 MOD SED SAME PHYS/QHP EA: CPT | Performed by: INTERNAL MEDICINE

## 2024-01-18 PROCEDURE — 84100 ASSAY OF PHOSPHORUS: CPT | Performed by: PHYSICIAN ASSISTANT

## 2024-01-18 PROCEDURE — 99291 CRITICAL CARE FIRST HOUR: CPT | Performed by: ANESTHESIOLOGY

## 2024-01-18 PROCEDURE — C1761 CATH SHOCKWAVE C2 3 X 12MM: HCPCS | Performed by: INTERNAL MEDICINE

## 2024-01-18 PROCEDURE — 5A0221D ASSISTANCE WITH CARDIAC OUTPUT USING IMPELLER PUMP, CONTINUOUS: ICD-10-PCS | Performed by: INTERNAL MEDICINE

## 2024-01-18 PROCEDURE — 93010 ELECTROCARDIOGRAM REPORT: CPT | Performed by: INTERNAL MEDICINE

## 2024-01-18 PROCEDURE — C1874 STENT, COATED/COV W/DEL SYS: HCPCS | Performed by: INTERNAL MEDICINE

## 2024-01-18 DEVICE — ANGIO-SEAL VIP VASCULAR CLOSURE DEVICE
Type: IMPLANTABLE DEVICE | Status: FUNCTIONAL
Brand: ANGIO-SEAL

## 2024-01-18 DEVICE — PERCLOSE™ PROSTYLE™ SUTURE-MEDIATED CLOSURE AND REPAIR SYSTEM
Type: IMPLANTABLE DEVICE | Status: FUNCTIONAL
Brand: PERCLOSE™ PROSTYLE™

## 2024-01-18 DEVICE — STENT ONYXNG30008UX ONYX 3.00X08RX
Type: IMPLANTABLE DEVICE | Status: FUNCTIONAL
Brand: ONYX FRONTIER™

## 2024-01-18 RX ORDER — AMOXICILLIN 250 MG
1 CAPSULE ORAL 2 TIMES DAILY
Status: DISCONTINUED | OUTPATIENT
Start: 2024-01-18 | End: 2024-01-20 | Stop reason: HOSPADM

## 2024-01-18 RX ORDER — PHENYLEPHRINE HYDROCHLORIDE 10 MG/ML
INJECTION INTRAVENOUS CODE/TRAUMA/SEDATION MEDICATION
Status: DISCONTINUED | OUTPATIENT
Start: 2024-01-18 | End: 2024-01-18 | Stop reason: HOSPADM

## 2024-01-18 RX ORDER — FENTANYL CITRATE 50 UG/ML
INJECTION, SOLUTION INTRAMUSCULAR; INTRAVENOUS CODE/TRAUMA/SEDATION MEDICATION
Status: DISCONTINUED | OUTPATIENT
Start: 2024-01-18 | End: 2024-01-18 | Stop reason: HOSPADM

## 2024-01-18 RX ORDER — CALCIUM GLUCONATE 20 MG/ML
2 INJECTION, SOLUTION INTRAVENOUS ONCE
Status: COMPLETED | OUTPATIENT
Start: 2024-01-18 | End: 2024-01-18

## 2024-01-18 RX ORDER — LIDOCAINE HYDROCHLORIDE 10 MG/ML
INJECTION, SOLUTION EPIDURAL; INFILTRATION; INTRACAUDAL; PERINEURAL CODE/TRAUMA/SEDATION MEDICATION
Status: DISCONTINUED | OUTPATIENT
Start: 2024-01-18 | End: 2024-01-18 | Stop reason: HOSPADM

## 2024-01-18 RX ORDER — METOCLOPRAMIDE HYDROCHLORIDE 5 MG/ML
10 INJECTION INTRAMUSCULAR; INTRAVENOUS ONCE
Status: COMPLETED | OUTPATIENT
Start: 2024-01-18 | End: 2024-01-18

## 2024-01-18 RX ORDER — SODIUM CHLORIDE 9 MG/ML
INJECTION, SOLUTION INTRAVENOUS
Status: DISCONTINUED | OUTPATIENT
Start: 2024-01-18 | End: 2024-01-18

## 2024-01-18 RX ORDER — MIDAZOLAM HYDROCHLORIDE 2 MG/2ML
INJECTION, SOLUTION INTRAMUSCULAR; INTRAVENOUS CODE/TRAUMA/SEDATION MEDICATION
Status: DISCONTINUED | OUTPATIENT
Start: 2024-01-18 | End: 2024-01-18 | Stop reason: HOSPADM

## 2024-01-18 RX ORDER — HYDROMORPHONE HCL IN WATER/PF 6 MG/30 ML
0.2 PATIENT CONTROLLED ANALGESIA SYRINGE INTRAVENOUS ONCE
Status: COMPLETED | OUTPATIENT
Start: 2024-01-18 | End: 2024-01-18

## 2024-01-18 RX ORDER — HEPARIN SODIUM 1000 [USP'U]/ML
INJECTION, SOLUTION INTRAVENOUS; SUBCUTANEOUS CODE/TRAUMA/SEDATION MEDICATION
Status: DISCONTINUED | OUTPATIENT
Start: 2024-01-18 | End: 2024-01-18 | Stop reason: HOSPADM

## 2024-01-18 RX ORDER — NOREPINEPHRINE BITARTRATE 1 MG/ML
INJECTION, SOLUTION INTRAVENOUS
Status: DISPENSED
Start: 2024-01-18 | End: 2024-01-18

## 2024-01-18 RX ADMIN — ACETAMINOPHEN 650 MG: 325 TABLET, FILM COATED ORAL at 12:14

## 2024-01-18 RX ADMIN — TACROLIMUS 1 MG: 1 CAPSULE ORAL at 18:30

## 2024-01-18 RX ADMIN — PANTOPRAZOLE SODIUM 40 MG: 40 INJECTION, POWDER, FOR SOLUTION INTRAVENOUS at 06:23

## 2024-01-18 RX ADMIN — SENNOSIDES, DOCUSATE SODIUM 1 TABLET: 8.6; 5 TABLET ORAL at 18:30

## 2024-01-18 RX ADMIN — ASPIRIN 81 MG CHEWABLE TABLET 81 MG: 81 TABLET CHEWABLE at 08:18

## 2024-01-18 RX ADMIN — NOREPINEPHRINE BITARTRATE 2 MCG/MIN: 1 INJECTION, SOLUTION, CONCENTRATE INTRAVENOUS at 00:10

## 2024-01-18 RX ADMIN — PANTOPRAZOLE SODIUM 40 MG: 40 INJECTION, POWDER, FOR SOLUTION INTRAVENOUS at 18:30

## 2024-01-18 RX ADMIN — HYDROMORPHONE HYDROCHLORIDE 0.2 MG: 0.2 INJECTION, SOLUTION INTRAMUSCULAR; INTRAVENOUS; SUBCUTANEOUS at 13:01

## 2024-01-18 RX ADMIN — METOCLOPRAMIDE 10 MG: 5 INJECTION, SOLUTION INTRAMUSCULAR; INTRAVENOUS at 12:46

## 2024-01-18 RX ADMIN — AMIODARONE HYDROCHLORIDE 200 MG: 200 TABLET ORAL at 12:04

## 2024-01-18 RX ADMIN — AMIODARONE HYDROCHLORIDE 200 MG: 200 TABLET ORAL at 18:29

## 2024-01-18 RX ADMIN — AMIODARONE HYDROCHLORIDE 200 MG: 200 TABLET ORAL at 07:55

## 2024-01-18 RX ADMIN — CLOPIDOGREL BISULFATE 75 MG: 75 TABLET ORAL at 08:18

## 2024-01-18 RX ADMIN — NOREPINEPHRINE BITARTRATE 1 MCG/MIN: 1 INJECTION, SOLUTION, CONCENTRATE INTRAVENOUS at 06:25

## 2024-01-18 RX ADMIN — CALCIUM GLUCONATE 2 G: 20 INJECTION, SOLUTION INTRAVENOUS at 12:03

## 2024-01-18 RX ADMIN — ATORVASTATIN CALCIUM 80 MG: 80 TABLET, FILM COATED ORAL at 18:29

## 2024-01-18 NOTE — QUICK NOTE
"Patient has declined endocrine assistance with insulin titration and management.     Will defer to primary team and sign off at this time. Please reconsult the endocrine service as needed.    Thank you for involving us in the care of your patient.    Please Tigertext questions to the clinician covering the \"[id number]\" Role. Thank you.    "

## 2024-01-18 NOTE — DISCHARGE INSTR - AVS FIRST PAGE
1. Please see the post angioplasty discharge instructions.   No heavy lifting, greater than 10 lbs. or strenuous activity for 5 days.  Follow angioplasty discharge instructions.    2.Remove band aid tomorrow.  Shower and wash area- groin gently with soap and water- beginning tomorrow. Rinse and pat dry.  Apply new water seal band aid.  Repeat this process for 5 days. No powders, creams lotions or antibiotic ointments  for 5 days.  No tub baths, hot tubs or swimming for 5 days.     3. Call [facility] Cardiology Office ([phone]) if you develop a fever, redness or drainage at your groin access site.      4. No driving for 2 days    5. triple therapy with aspirin 81 mg daily, Plavix 75 mg daily and Eliquis 2.5 mg twice daily for at least 1 month and then continue with Plavix 75 mg daily and Eliquis 2.5 mg twice daily.      6. Stent card and book.    7.Perclose Booklet    Instructions for you from your physician regarding your new blood thinning medication:    Do not stop taking Eliquis unless instructed by a physician.    Take Eliquis at the same time every day approximately 12 hr apart.    If a skin injury and bleeding occurs, hold pressure on the area with a clean cloth or bandage for approximately 10 min, if bleeding does not stop seek medical attention.    If you see blood in mucus, vomit, urine, stools, or black stools, seek medical attention immediately.    If you injure your head, go to an emergency department for a CAT scan of the brain to check for internal bleeding.    Avoid medications like aspirin, ibuprofen, naproxen to avoid the risk of bleeding stomach ulcers.    Avoid alcohol use to prevent injuries and internal bleeding.    Wear a medical alert bracelet stating that you take an anticoagulant.    Notify your physicians, surgeons, and dentist that you are now taking a blood thinner prior to any surgeries, procedures, or dental work.

## 2024-01-18 NOTE — ASSESSMENT & PLAN NOTE
Likely related to NSTEMI from septic shock and element of stress cardiomyopathy with  possible ischemic element given regional wall changes   1/7/24 TTE: LV cavity size is normal. Wall thickness is mildly increased. LVEF 40%. Systolic function is moderately reduced. Following segments are hypokinetic: mid anteroseptal, mid inferior, apical anterior, apical septal, apical inferior and apex.RV normal. Mild MR. Mild TR    Plan:   Volume removal with HD  Daily weights  Continue holding home afterload reduction agents/AV aubrie blockers given resolving septic/ cardiogenic/ hemorrhagic  shock:   Carvedilol 6.25mg BID  Isosorbide mononitrate 30mg q24h   Metoprolol succinate 50mg q24  Nifedipine 30mg q24 hours   Hypotensive requiring low-dose levophed - consider adding midodrine   Meds as above for NSTEMI  Cardiology consulted, appreciate recommendations

## 2024-01-18 NOTE — ASSESSMENT & PLAN NOTE
New-onset in AM of 1/11. With occasional RVR with associated hypotension. Amiodarone bolus x1 given at RRT while in dialysis 1/11 AM.    Plan:  Currently NSR  Amiodarone 200 TID  Heparin drip on hold for cath lab, restart post-procedure

## 2024-01-18 NOTE — PROGRESS NOTES
Pastoral Care Progress Note    2024  Patient: Berto Faria : 1970  Admission Date & Time: 2024 1326  MRN: [id number] CSN: [id number]       intro while rounds found pt in procedure and wife in room alone. Wife shared that it has been an unsettling time.  shared that we are here to support in these times, but wife share no concerns.  remains available.                  24 1200   Clinical Encounter Type   Visited With Family   Routine Visit Introduction

## 2024-01-18 NOTE — PROGRESS NOTES
[facility]  Progress Note  Name: Berto Faria I  MRN: [id number]  Unit/Bed#: [facility] 515-01 I Date of Admission: 1/8/2024   Date of Service: 1/18/2024 I Hospital Day: 10    Assessment/Plan   * NSTEMI (non-ST elevated myocardial infarction) (HCC)  Assessment & Plan  Presented to [facility] ED with shortness of breath. Troponin and EKG checked as part of initial workup. EKG without acute ischemic changes   NSTEMI likely related to increased demand from acute hypoxic respiratory failure and septic shock   Troponin 28059 > 69096 > 38717   Cardiac Cath 1/12:Severe in-stent restenosis bifurcation ostial LAD/LCx,  RCA, elevated LVEDP, porcelain aorta.  CT Surgery Consulted-recommendations for supportive care or high risk PCI    Plan:  ASA 81mg daily, Plavix 75 Daily   Atorvastatin 80mg qHS   Continue holding home afterload reduction agents/AV aubrie blockers given boarderline Blood Pressure    Carvedilol 6.25mg BID  Isosorbide mononitrate 30mg q24h   Metoprolol succinate 50mg q24  Nifedipine 30mg q24 hours   Cardiology consulted, appreciate recommendations  Plan for Impella assisted high risk PCI today    GI bleed w/ Hemorrhagic Shock  Assessment & Plan  Acute blood loss anemia  with hemorrhagic shock present on 1/13 with large melanotic stools.  Patient does have history of GI bleed and previous hospitalization, required intervention in 2019 at [facility]  Received 3 units pRBC  1/14 EGD/flex sig: Significant amount of blood in the descending colon, sigmoid colon, and rectum.  No ischemic changes identified.  Single cratered ulcer in the first part of the duodenum with visible vessel, clips applied.    Plan:  Continue Protonix 40 BID  Continue to monitor frequent hemoglobin  Continue monitor hemodynamics, consider transfusion/hemoglobin recheck with any decline  GI following, appreciate their recommendations    New onset a-fib (HCC)  Assessment & Plan  New-onset in AM of 1/11. With  occasional RVR with associated hypotension. Amiodarone bolus x1 given at RRT while in dialysis  AM.    Plan:  Currently NSR  Amiodarone 200 TID  Heparin drip on hold for cath lab, restart post-procedure    Acute on chronic diastolic (congestive) heart failure (HCC)  Assessment & Plan  Likely related to NSTEMI from septic shock and element of stress cardiomyopathy with  possible ischemic element given regional wall changes   24 TTE: LV cavity size is normal. Wall thickness is mildly increased. LVEF 40%. Systolic function is moderately reduced. Following segments are hypokinetic: mid anteroseptal, mid inferior, apical anterior, apical septal, apical inferior and apex.RV normal. Mild MR. Mild TR    Plan:   Volume removal with HD  Daily weights  Continue holding home afterload reduction agents/AV aubrie blockers given resolving septic/ cardiogenic/ hemorrhagic  shock:   Carvedilol 6.25mg BID  Isosorbide mononitrate 30mg q24h   Metoprolol succinate 50mg q24  Nifedipine 30mg q24 hours   Hypotensive requiring low-dose levophed - consider adding midodrine   Meds as above for NSTEMI  Cardiology consulted, appreciate recommendations      Multifocal pneumonia  Assessment & Plan  Presented 23 to Mi with new oxygen requirement, fatigue   Relevant imagin/7/24 CT Chest: Bilateral multilevel airspace consolidation, groundglass opacities, septal thickening and small bilateral pleural effusions secondary to volume overloaded as well as pneumonia.  Initial procalcitonin: 3.16 (24)  Possibly falsely elevated due to ESRD, but had lower levels in the past   Cultures:  24 BCXx2: No growth  23 COVID/Flu/RSV: Negative   23 RP2: Negative   24 MRSA Cx: Negative    24 BCXx2: No growth     Plan:   Completed Cefepime Course   Continue to monitor fever and WBC curve       ESRD (end stage renal disease) (HCC)  Assessment & Plan  Dialysis regimen: Tuesday, Thursday, Saturday via right arm AV fistula    1/8/24: Right IJ temporary HD catheter placement   1/8/24: CRRT initiated   1/9/24: Tolerated -100 to -150 cc/hr volume removal   Re-initiation of CRRT 1/11-1/17    Plan:   Plan to transition back to IHD today  Cinacalcet 30mg daily   Nephrology consulted, appreciate recommendations    Type 1 diabetes mellitus on insulin therapy (HCC)  Assessment & Plan  Lab Results   Component Value Date    HGBA1C 7.7 (H) 01/07/2024    HGBA1C 6.5 (H) 02/23/2018       Plan:   Continue AC/HS sliding scale algorithm   Lantus 6 units q12h   Adjust insulin regimen as needed to maintain goal -180  Carb controlled diet   Patient intermittently non-complaint with Insulin     Renal transplant failure and rejection  Assessment & Plan  History of renal transplant chronically on tacrolimus 1 mg p.o. nightly, 2 mg p.o. every morning, AZA 50 daily, Prednisone 5 Daily    Plan:  Continue tacrolimus 2mg qAM, 1mg qPM  Prednisone 5 mg qD  Holding home azathioprine 50mg daily   Nephrology consulted, appreciate recommendations   Reach out to transplant center for update regarding these medications    Acute on chronic anemia  Assessment & Plan  Secondary to chronic disease and ESRD  Baseline hemoglobin: 9.6 - 11.8  Acute hypotension anemia on 1/13 requiring transfusion  Blood products:  1/13: 2 unit PRBC CMV negative, irradiated  1/14 1u pRBC  1/17 1u PRBC    Plan:   Transfuse as indicated in the setting of hemodynamic instability or signs of active bleeding  Monitor Frequent Hg  Plan as above due to GIB    S/P AKA (above knee amputation), right (HCC)  Assessment & Plan  Secondary to chronic osteomyelitis     Plan:   PT/OT when appropriate   Offloading and frequent turns              Disposition: Critical care    ICU Core Measures     A: Assess, Prevent, and Manage Pain Has pain been assessed? Yes  Need for changes to pain regimen? No   B: Both SAT/SAT  N/A   C: Choice of Sedation RASS Goal: N/A patient not on sedation  Need for changes to  sedation or analgesia regimen? No   D: Delirium CAM-ICU: Negative   E: Early Mobility  Plan for early mobility? Yes   F: Family Engagement Plan for family engagement today? Yes       Review of Invasive Devices:      Central access plan: HD cath in place.  Plan maintain      Prophylaxis:  VTE VTE covered by:  heparin (porcine), Intravenous, 6 Units/kg/hr at 24 2330       Stress Ulcer  covered bypantoprazole (PROTONIX) 40 mg tablet [[id number]] (Long-Term Med), pantoprazole (PROTONIX) injection 40 mg [[id number]]         Significant 24hr Events     24hr events: Hypotensive overnight - given 1 PRBCs. Started on low dose levophed. NPO pending high risk PCI.     Subjective   Review of Systems   Constitutional:  Positive for fatigue.   Respiratory:  Negative for shortness of breath.    Cardiovascular:  Negative for chest pain.   Gastrointestinal:  Negative for abdominal pain and nausea.      Objective                            Vitals I/O      Most Recent Min/Max in 24hrs   Temp (!) 97.3 °F (36.3 °C) Temp  Min: 97.3 °F (36.3 °C)  Max: 99 °F (37.2 °C)   Pulse 65 Pulse  Min: 58  Max: 75   Resp 18 Resp  Min: 18  Max: 20   BP (!) 89/53 BP  Min: 74/41  Max: 106/59   O2 Sat 100 % SpO2  Min: 91 %  Max: 100 %      Intake/Output Summary (Last 24 hours) at 2024 0209  Last data filed at 2024 2330  Gross per 24 hour   Intake 919.04 ml   Output 0 ml   Net 919.04 ml       Diet NPO; Sips of clear liquids    Invasive Monitoring           Physical Exam   Physical Exam  Skin:     General: Skin is warm and dry.      Capillary Refill: Capillary refill takes 2 to 3 seconds.      Coloration: Skin is pale.   HENT:      Head: Atraumatic.      Mouth/Throat:      Mouth: Mucous membranes are moist.   Cardiovascular:      Rate and Rhythm: Normal rate and regular rhythm.   Musculoskeletal:      Right lower le+ Pitting Edema present.      Left lower le+ Pitting Edema present.      Right Lower Extremity: Right leg is amputated  above knee.   Abdominal: General: There is no distension.      Palpations: Abdomen is soft.      Tenderness: There is no abdominal tenderness.   Constitutional:       General: He is not in acute distress.  Pulmonary:      Effort: Pulmonary effort is normal.      Breath sounds: No wheezing, rhonchi or rales.   Neurological:      General: No focal deficit present.      Mental Status: He is alert and oriented to person, place and time.            Diagnostic Studies      EKG: Sinus bradycardia. Inferior T wave inversions.      Medications:  Scheduled PRN   amiodarone, 200 mg, TID With Meals  aspirin, 81 mg, QAM  atorvastatin, 80 mg, Daily With Dinner  calcitriol, 0.5 mcg, Once per day on Tuesday Thursday Saturday  cinacalcet, 30 mg, Daily  clopidogrel, 75 mg, Daily  insulin glargine, 6 Units, Q12H JAMARCUS  insulin lispro, 1-6 Units, HS  insulin lispro, 1-6 Units, TID With Meals  melatonin, 3 mg, HS  norepinephrine, ,   pantoprazole, 40 mg, Q12H JAMARCUS  predniSONE, 5 mg, Daily  tacrolimus, 1 mg, QPM  tacrolimus, 2 mg, QAM      acetaminophen, 650 mg, Q6H PRN  albuterol, 2 puff, Q4H PRN  glycerin-hypromellose-, 1 drop, Q4H PRN  norepinephrine, ,   ondansetron, 4 mg, Q8H PRN       Continuous    heparin (porcine), 3-20 Units/kg/hr (Order-Specific), Last Rate: 6 Units/kg/hr (01/17/24 2330)  norepinephrine, 1-30 mcg/min, Last Rate: 1 mcg/min (01/18/24 0043)         Labs:    CBC    Recent Labs     01/17/24  0030 01/17/24  0441   WBC 11.20* 11.10*   HGB 7.6* 7.7*   HCT 23.9* 25.0*   * 108*     BMP    Recent Labs     01/16/24  2158 01/17/24  0603   SODIUM 136 135   K 4.9 5.0    103   CO2 26 24   AGAP 6 8   BUN 15 23   CREATININE 2.30* 2.95*   CALCIUM 8.4 8.4       Coags    Recent Labs     01/17/24  1325 01/17/24  2141   PTT >210* >210*        Additional Electrolytes  Recent Labs     01/16/24 2158 01/17/24  0603   MG 2.2 2.2   PHOS 3.0 4.0   CAIONIZED 1.15 1.14          Blood Gas    No recent results  No recent  results LFTs  No recent results    Infectious  No recent results  Glucose  Recent Labs     01/16/24  1010 01/16/24  1602 01/16/24  2158 01/17/24  0603   GLUC 166* 107 79 216*                   Mya Rios PA-C

## 2024-01-18 NOTE — ASSESSMENT & PLAN NOTE
Presented to [facility] ED with shortness of breath. Troponin and EKG checked as part of initial workup. EKG without acute ischemic changes   NSTEMI likely related to increased demand from acute hypoxic respiratory failure and septic shock   Troponin 85563 > 93318 > 68179   Cardiac Cath 1/12:Severe in-stent restenosis bifurcation ostial LAD/LCx,  RCA, elevated LVEDP, porcelain aorta.  CT Surgery Consulted-recommendations for supportive care or high risk PCI    Plan:  ASA 81mg daily, Plavix 75 Daily   Atorvastatin 80mg qHS   Continue holding home afterload reduction agents/AV aubrie blockers given boarderline Blood Pressure    Carvedilol 6.25mg BID  Isosorbide mononitrate 30mg q24h   Metoprolol succinate 50mg q24  Nifedipine 30mg q24 hours   Cardiology consulted, appreciate recommendations  Plan for Impella assisted high risk PCI today

## 2024-01-18 NOTE — PLAN OF CARE
Pt on HD treatment for 3 hours with a Uf goal of even. Pt on a 2 k 2.5 erik bath for a potassium of on 01/18/24.  Problem: METABOLIC, FLUID AND ELECTROLYTES - ADULT  Goal: Electrolytes maintained within normal limits  Description: INTERVENTIONS:  - Monitor labs and assess patient for signs and symptoms of electrolyte imbalances  - Administer electrolyte replacement as ordered  - Monitor response to electrolyte replacements, including repeat lab results as appropriate  - Instruct patient on fluid and nutrition as appropriate  Outcome: Progressing  Goal: Fluid balance maintained  Description: INTERVENTIONS:  - Monitor labs   - Monitor I/O and WT  - Instruct patient on fluid and nutrition as appropriate  - Assess for signs & symptoms of volume excess or deficit  Outcome: Progressing

## 2024-01-18 NOTE — HEMODIALYSIS
Post-Dialysis RN Treatment Note    Blood Pressure:  Pre  91/53 mm/Hg  Post 98/57 mmHg   EDW  47.5 kg    Weight:  Pre None Reported kg   Post None Reported kg   Mode of weight measurement: N/A   Volume Removed  0 ml    Treatment duration 180 minutes    NS given  No    Treatment shortened? No   Medications given during Rx None Reported   Estimated Kt/V  None Reported   Access type: AV fistula   Access Issues: No    Report called to primary nurse   Yes

## 2024-01-18 NOTE — PROGRESS NOTES
Cardiology Team 2 Progress Note - Berto Faria 53 y.o. male MRN: [id number]  Unit/Bed#: [facility] 515-01 Encounter: [id number]      Subjective:   Patient seen and examined.Noted to have a drop in Hgb, s/p 1U of pRBC. Continued to have tenuous hemodynamics requiring low dose lovephed at 1mcg/min. Hgb this morning, rise in WBC this AM. He remained on 2L NC.     Plan:  CHIP with VAD support today with Dr. Bloom. Discussed the case with Dr. Bloom in the morning, on 1/18.  Continue with DAPT  Heparin held at 6 am   CRRT as per nephrology, would greatly appreciate it if patient can be optimized for cath on Thursday.   Further recommendations post cath.       - VAS limited iliac-femoral eval for IMPELLA:    - Right ilio-femoral: 0.4-0.7 cm    - Left ilio-femoral: 0.4-0.7 cm   - distal abdominal aorta- 1.1 cm       Assessment:  Berto Faria is a 53 y.o. male with a PMH of HFrEF, ESRD, DM1, CAD w/ prior PEA arrest, Hx of R AKA, Afib who presents as an xfer from [facility] for continued management of NSTEMI and cardiogenic and septic shock. S/p LHC showing stent stenosis of Lcx + LAD stents, RCA . Acute drop of Hb to 4.9, s/p 3u pRBC w/ adequate response, due to duodenal bleeding now s/p clip x1 with ovesco clip w/ hemostasis.     Principal Problem:    NSTEMI (non-ST elevated myocardial infarction) ([facility])  Active Problems:    Type 1 diabetes mellitus on insulin therapy ([facility])    Acidosis    GI bleed w/ Hemorrhagic Shock    S/P AKA (above knee amputation), right ([facility])    Acute on chronic anemia    ESRD (end stage renal disease) ([facility])    Multifocal pneumonia    Acute on chronic diastolic (congestive) heart failure ([facility])    Renal transplant failure and rejection    New onset a-fib ([facility])      Assessment:  1.Shock - septic/cardiogenic/hemorrhagic ; now resolved   - Presented as a transfer from  [facility] as NSTEMI, developed cardio- and distributive- shock  -Status: SCAI stage B-C, warm without evidence of endorgan damage  -  "TTE(1/11/24): LVIDD 5 cm, eccentric hypertrophy, LVEF 32%, severe global hypokinesis with wall motion abnormalities in anteroseptal, inferior and apical walls.  Mild to moderate MR, mild to moderate TR with eRVSP of 57.   - LHC(1/12/24): Right femoral access- Ostial LAD 95%, ostial circumflex to proximal circumflex 75%, proximal %, mid LAD 60%, distal left main 50%, first marginal 100%.  Severe in-stent restenosis at the bifurcation of ostial LAD/left circumflex; moderately elevated LVEDP  - CABG turn down   - VAS limited iliac-femoral eval for IMPELLA:    - Right ilio-femoral: 0.4-0.7 cm    - Left ilio-femoral: 0.4-0.7 cm   - distal abdominal aorta- 1.1 cm     2.Paroxysmal atrial fibrillation  -   New diagnosis of A-fib, on 1/11, occasional RVR with associated hypotension.  -Currently on Amio drip in normal sinus rhythm    3.Acute HFrEF-   -In setting of NSTEMI and septic shock superimposed on ischemic cardiomyopathy  -Preload: On CRRT  -Afterload: Was taken off pressor support today, will monitor for 24 hours prior to resuming home meds   -Home medication: Coreg 6.25 mg twice daily, isosorbide mononitrate 30 mg daily, metoprolol succinate 50 mg daily, nifedipine 30 mg daily    4.Multifocal pneumonia-resolved  5.Cardiomyopathy - ischemic - EF 30%  6.Acute respiratory failure with hypoxia  7.Type 2 myocardial infarction secondary to #2/4  8.ESRD on HD - current on CRRT  9.Renal transplant failure/rejection   10.Right AKA secondary to osteomyelitis   11.Type 1 diabetes mellitus  12.duodenal ulcer-now status post x 1 ovesco clip(1/13/2024)        Case discussed and reviewed with Dr. Murray who agrees with my assessment and plan.Thank you for involving us in the care of your patient.    Rodger Crespo MD  Cardiology Fellow   PGY-5      Vitals: Blood pressure (!) 89/53, pulse 65, temperature (!) 97.3 °F (36.3 °C), temperature source Oral, resp. rate 18, height 5' 4\" (1.626 m), weight 48.9 kg (107 lb 12.9 oz), " SpO2 100%., Body mass index is 18.5 kg/m².,   Orthostatic Blood Pressures      Flowsheet Row Most Recent Value   Blood Pressure 89/53 filed at 01/18/2024 0045   Patient Position - Orthostatic VS Sitting filed at 01/17/2024 0930              Intake/Output Summary (Last 24 hours) at 1/18/2024 0747  Last data filed at 1/18/2024 0400  Gross per 24 hour   Intake 1206.31 ml   Output 0 ml   Net 1206.31 ml       Review of System:  Review of system was conducted and was negative except for as stated in the subjective course.    Physical Exam:    GEN: Berto Faria appears well, alert and oriented x 3, pleasant and cooperative   NECK: No JVD or carotid bruits; right HD catheter  HEART: Regular rhythm, normal rate, normal S1 and S2, no murmurs, clicks, gallops or rubs   LUNGS: Clear to auscultation bilaterally; no wheezes, rales, or rhonchi; respiration nonlabored   ABDOMEN:  Normoactive bowel sounds, soft, no tenderness, no distention  EXTREMITIES: Right AKA        Current Facility-Administered Medications:     acetaminophen (TYLENOL) tablet 650 mg, 650 mg, Oral, Q6H PRN, Rosalva Taylor PA-C, 650 mg at 01/17/24 1328    albuterol (PROVENTIL HFA,VENTOLIN HFA) inhaler 2 puff, 2 puff, Inhalation, Q4H PRN, Katie Grecsek, CRNP    amiodarone tablet 200 mg, 200 mg, Oral, TID With Meals, Katie Grecsek, CRNP, 200 mg at 01/17/24 1709    aspirin chewable tablet 81 mg, 81 mg, Oral, QAM, Katie Grecsek, CRNP, 81 mg at 01/17/24 0803    atorvastatin (LIPITOR) tablet 80 mg, 80 mg, Oral, Daily With Dinner, Katie Grecsek, CRNP, 80 mg at 01/17/24 1709    calcitriol (ROCALTROL) capsule 0.5 mcg, 0.5 mcg, Oral, Once per day on Tuesday Thursday Saturday, Katie Grecsek, CRNP, 0.5 mcg at 01/16/24 0800    cinacalcet (SENSIPAR) tablet 30 mg, 30 mg, Oral, Daily, GAVINO Baker, 30 mg at 01/17/24 0803    clopidogrel (PLAVIX) tablet 75 mg, 75 mg, Oral, Daily, Carlie Chan PA-C, 75 mg at 01/17/24 0803    glycerin-hypromellose-PEG  400 (ARTIFICIAL TEARS) ophthalmic solution 1 drop, 1 drop, Right Eye, Q4H PRN, GAVINO Baker    insulin glargine (LANTUS) subcutaneous injection 6 Units 0.06 mL, 6 Units, Subcutaneous, Q12H [facility], Carlie Chan PA-C, 6 Units at 01/17/24 2011    insulin lispro (HumaLOG) 100 units/mL subcutaneous injection 1-6 Units, 1-6 Units, Subcutaneous, HS, GAVINO Baker, 1 Units at 01/17/24 2142    insulin lispro (HumaLOG) 100 units/mL subcutaneous injection 1-6 Units, 1-6 Units, Subcutaneous, TID With Meals, 2 Units at 01/17/24 1625 **AND** Fingerstick Glucose (POCT), , , TID AC, GAVINO Baker    melatonin tablet 3 mg, 3 mg, Oral, HS, Mya Rios PA-C, 3 mg at 01/17/24 2330    norepinephrine (LEVOPHED) 1 mg/mL injection **ADS Override Pull**, , , ,     norepinephrine (LEVOPHED) 4 mg (STANDARD CONCENTRATION) IV in sodium chloride 0.9% 250 mL, 1-30 mcg/min, Intravenous, Titrated, Mya Rios PA-C, Last Rate: 3.8 mL/hr at 01/18/24 0625, 1 mcg/min at 01/18/24 0625    ondansetron (ZOFRAN) injection 4 mg, 4 mg, Intravenous, Q8H PRN, GAVINO Baker, 4 mg at 01/16/24 1330    pantoprazole (PROTONIX) injection 40 mg, 40 mg, Intravenous, Q12H [facility], Andre Mendoza PA-C, 40 mg at 01/18/24 0623    predniSONE tablet 5 mg, 5 mg, Oral, Daily, GAVINO Baker, 5 mg at 01/17/24 0803    senna-docusate sodium (SENOKOT S) 8.6-50 mg per tablet 1 tablet, 1 tablet, Oral, BID, Mya Rios PA-C    tacrolimus (PROGRAF) capsule 1 mg, 1 mg, Oral, QPM, GAVINO Baker, 1 mg at 01/17/24 1709    tacrolimus (PROGRAF) capsule 2 mg, 2 mg, Oral, Ktaie CAMPBELL CRNP, 2 mg at 01/17/24 0803    Labs & Results:  Results from last 7 days   Lab Units 01/11/24  2229 01/11/24  2035 01/11/24  1840   HS TNI 0HR ng/L  --   --  11,399*   HS TNI 2HR ng/L  --  10,340*  --    HS TNI 4HR ng/L 9,884*  --   --          Results from last 7 days   Lab Units 01/18/24  0631 01/17/24  0603 01/16/24  2158   POTASSIUM  mmol/L 5.0 5.0 4.9   CO2 mmol/L 23 24 26   CHLORIDE mmol/L 101 103 104   BUN mg/dL 40* 23 15   CREATININE mg/dL 4.55* 2.95* 2.30*     Results from last 7 days   Lab Units 01/18/24  0631 01/17/24  0441 01/17/24  0030   HEMOGLOBIN g/dL 10.5* 7.7* 7.6*   HEMATOCRIT % 32.3* 25.0* 23.9*   PLATELETS Thousands/uL 185 108* 107*           Telemetry:   Personally reviewed by Rodger Crespo MD: NSR

## 2024-01-18 NOTE — ASSESSMENT & PLAN NOTE
Secondary to chronic disease and ESRD  Baseline hemoglobin: 9.6 - 11.8  Acute hypotension anemia on 1/13 requiring transfusion  Blood products:  1/13: 2 unit PRBC CMV negative, irradiated  1/14 1u pRBC  1/17 1u PRBC    Plan:   Transfuse as indicated in the setting of hemodynamic instability or signs of active bleeding  Monitor Frequent Hg  Plan as above due to GIB

## 2024-01-18 NOTE — PLAN OF CARE
Problem: Prexisting or High Potential for Compromised Skin Integrity  Goal: Skin integrity is maintained or improved  Description: INTERVENTIONS:  - Identify patients at risk for skin breakdown  - Assess and monitor skin integrity  - Assess and monitor nutrition and hydration status  - Monitor labs   - Assess for incontinence   - Turn and reposition patient  - Assist with mobility/ambulation  - Relieve pressure over bony prominences  - Avoid friction and shearing  - Provide appropriate hygiene as needed including keeping skin clean and dry  - Evaluate need for skin moisturizer/barrier cream  - Collaborate with interdisciplinary team   - Patient/family teaching  - Consider wound care consult   Outcome: Progressing     Problem: PAIN - ADULT  Goal: Verbalizes/displays adequate comfort level or baseline comfort level  Description: Interventions:  - Encourage patient to monitor pain and request assistance  - Assess pain using appropriate pain scale  - Administer analgesics based on type and severity of pain and evaluate response  - Implement non-pharmacological measures as appropriate and evaluate response  - Consider cultural and social influences on pain and pain management  - Notify physician/advanced practitioner if interventions unsuccessful or patient reports new pain  Outcome: Progressing     Problem: INFECTION - ADULT  Goal: Absence or prevention of progression during hospitalization  Description: INTERVENTIONS:  - Assess and monitor for signs and symptoms of infection  - Monitor lab/diagnostic results  - Monitor all insertion sites, i.e. indwelling lines, tubes, and drains  - Monitor endotracheal if appropriate and nasal secretions for changes in amount and color  - [place] appropriate cooling/warming therapies per order  - Administer medications as ordered  - Instruct and encourage patient and family to use good hand hygiene technique  - Identify and instruct in appropriate isolation precautions for  identified infection/condition  Outcome: Progressing  Goal: Absence of fever/infection during neutropenic period  Description: INTERVENTIONS:  - Monitor WBC    Outcome: Progressing     Problem: SAFETY ADULT  Goal: Patient will remain free of falls  Description: INTERVENTIONS:  - Educate patient/family on patient safety including physical limitations  - Instruct patient to call for assistance with activity   - Consult OT/PT to assist with strengthening/mobility   - Keep Call bell within reach  - Keep bed low and locked with side rails adjusted as appropriate  - Keep care items and personal belongings within reach  - Initiate and maintain comfort rounds  - Make Fall Risk Sign visible to staff  - Apply yellow socks and bracelet for high fall risk patients  - Consider moving patient to room near nurses station  Outcome: Progressing  Goal: Maintain or return to baseline ADL function  Description: INTERVENTIONS:  -  Assess patient's ability to carry out ADLs; assess patient's baseline for ADL function and identify physical deficits which impact ability to perform ADLs (bathing, care of mouth/teeth, toileting, grooming, dressing, etc.)  - Assess/evaluate cause of self-care deficits   - Assess range of motion  - Assess patient's mobility; develop plan if impaired  - Assess patient's need for assistive devices and provide as appropriate  - Encourage maximum independence but intervene and supervise when necessary  - Involve family in performance of ADLs  - Assess for home care needs following discharge   - Consider OT consult to assist with ADL evaluation and planning for discharge  - Provide patient education as appropriate  Outcome: Progressing     Problem: Knowledge Deficit  Goal: Patient/family/caregiver demonstrates understanding of disease process, treatment plan, medications, and discharge instructions  Description: Complete learning assessment and assess knowledge base.  Interventions:  - Provide teaching at level of  understanding  - Provide teaching via preferred learning methods  Outcome: Progressing     Problem: NEUROSENSORY - ADULT  Goal: Achieves stable or improved neurological status  Description: INTERVENTIONS  - Monitor and report changes in neurological status  - Monitor vital signs such as temperature, blood pressure, glucose, and any other labs ordered   - Initiate measures to prevent increased intracranial pressure  - Monitor for seizure activity and implement precautions if appropriate      Outcome: Progressing  Goal: Remains free of injury related to seizures activity  Description: INTERVENTIONS  - Maintain airway, patient safety  and administer oxygen as ordered  - Monitor patient for seizure activity, document and report duration and description of seizure to physician/advanced practitioner  - If seizure occurs,  ensure patient safety during seizure  - Reorient patient post seizure  - Seizure pads on all 4 side rails  - Instruct patient/family to notify RN of any seizure activity including if an aura is experienced  - Instruct patient/family to call for assistance with activity based on nursing assessment  - Administer anti-seizure medications if ordered    Outcome: Progressing     Problem: RESPIRATORY - ADULT  Goal: Achieves optimal ventilation and oxygenation  Description: INTERVENTIONS:  - Assess for changes in respiratory status  - Assess for changes in mentation and behavior  - Position to facilitate oxygenation and minimize respiratory effort  - Oxygen administered by appropriate delivery if ordered  - Initiate smoking cessation education as indicated  - Encourage broncho-pulmonary hygiene including cough, deep breathe, Incentive Spirometry  - Assess the need for suctioning and aspirate as needed  - Assess and instruct to report SOB or any respiratory difficulty  - Respiratory Therapy support as indicated  Outcome: Progressing     Problem: GASTROINTESTINAL - ADULT  Goal: Minimal or absence of nausea and/or  vomiting  Description: INTERVENTIONS:  - Administer IV fluids if ordered to ensure adequate hydration  - Maintain NPO status until nausea and vomiting are resolved  - Nasogastric tube if ordered  - Administer ordered antiemetic medications as needed  - Provide nonpharmacologic comfort measures as appropriate  - Advance diet as tolerated, if ordered  - Consider nutrition services referral to assist patient with adequate nutrition and appropriate food choices  Outcome: Progressing  Goal: Maintains or returns to baseline bowel function  Description: INTERVENTIONS:  - Assess bowel function  - Encourage oral fluids to ensure adequate hydration  - Administer IV fluids if ordered to ensure adequate hydration  - Administer ordered medications as needed  - Encourage mobilization and activity  - Consider nutritional services referral to assist patient with adequate nutrition and appropriate food choices  Outcome: Progressing  Goal: Maintains adequate nutritional intake  Description: INTERVENTIONS:  - Monitor percentage of each meal consumed  - Identify factors contributing to decreased intake, treat as appropriate  - Assist with meals as needed  - Monitor I&O, weight, and lab values if indicated  - Obtain nutrition services referral as needed  Outcome: Progressing  Goal: Establish and maintain optimal ostomy function  Description: INTERVENTIONS:  - Assess bowel function  - Encourage oral fluids to ensure adequate hydration  - Administer IV fluids if ordered to ensure adequate hydration   - Administer ordered medications as needed  - Encourage mobilization and activity  - Nutrition services referral to assist patient with appropriate food choices  - Assess stoma site  - Consider wound care consult   Outcome: Progressing  Goal: Oral mucous membranes remain intact  Description: INTERVENTIONS  - Assess oral mucosa and hygiene practices  - Implement preventative oral hygiene regimen  - Implement oral medicated treatments as  ordered  - Initiate Nutrition services referral as needed  Outcome: Progressing     Problem: METABOLIC, FLUID AND ELECTROLYTES - ADULT  Goal: Fluid balance maintained  Description: INTERVENTIONS:  - Monitor labs   - Monitor I/O and WT  - Instruct patient on fluid and nutrition as appropriate  - Assess for signs & symptoms of volume excess or deficit  Outcome: Progressing     Problem: SKIN/TISSUE INTEGRITY - ADULT  Goal: Skin Integrity remains intact(Skin Breakdown Prevention)  Description: Assess:  -Inspect skin when repositioning, toileting, and assisting with ADLS  -Assess extremities for adequate circulation and sensation     Bed Management:  -Have minimal linens on bed & keep smooth, unwrinkled  -Change linens as needed when moist or perspiring    Toileting:  -Offer bedside commode    Activity:  -Encourage activity and walks on unit  -Encourage or provide ROM exercises   -Turn and reposition patient every 2 Hours  -Use appropriate equipment to lift or move patient in bed    Skin Care:  -Avoid use of baby powder, tape, friction and shearing, hot water or constrictive clothing  -Relieve pressure over bony prominences using Mepilex  -Do not massage red bony areas    Outcome: Progressing  Goal: Incision(s), wounds(s) or drain site(s) healing without S/S of infection  Description: INTERVENTIONS  - Assess and document dressing, incision, wound bed, drain sites and surrounding tissue  - Provide patient and family education    Outcome: Progressing  Goal: Pressure injury heals and does not worsen  Description: Interventions:  - Implement low air loss mattress or specialty surface (Criteria met)  - Apply silicone foam dressing  - Apply fecal or urinary incontinence containment device   - Utilize friction reducing device or surface for transfers   - Consider nutrition services referral as needed  Outcome: Progressing     Problem: HEMATOLOGIC - ADULT  Goal: Maintains hematologic stability  Description: INTERVENTIONS  -  Assess for signs and symptoms of bleeding or hemorrhage  - Monitor labs  - Administer supportive blood products/factors as ordered and appropriate  Outcome: Progressing     Problem: MUSCULOSKELETAL - ADULT  Goal: Maintain or return mobility to safest level of function  Description: INTERVENTIONS:  - Assess patient's ability to carry out ADLs; assess patient's baseline for ADL function and identify physical deficits which impact ability to perform ADLs (bathing, care of mouth/teeth, toileting, grooming, dressing, etc.)  - Assess/evaluate cause of self-care deficits   - Assess range of motion  - Assess patient's mobility  - Assess patient's need for assistive devices and provide as appropriate  - Encourage maximum independence but intervene and supervise when necessary  - Involve family in performance of ADLs  - Assess for home care needs following discharge   - Consider OT consult to assist with ADL evaluation and planning for discharge  - Provide patient education as appropriate  Outcome: Progressing  Goal: Maintain proper alignment of affected body part  Description: INTERVENTIONS:  - Support, maintain and protect limb and body alignment  - Provide patient/ family with appropriate education  Outcome: Progressing     Problem: Nutrition/Hydration-ADULT  Goal: Nutrient/Hydration intake appropriate for improving, restoring or maintaining nutritional needs  Description: Monitor and assess patient's nutrition/hydration status for malnutrition. Collaborate with interdisciplinary team and initiate plan and interventions as ordered.  Monitor patient's weight and dietary intake as ordered or per policy. Utilize nutrition screening tool and intervene as necessary. Determine patient's food preferences and provide high-protein, high-caloric foods as appropriate.     INTERVENTIONS:  - Monitor oral intake, urinary output, labs, and treatment plans  - Assess nutrition and hydration status and recommend course of action  - Evaluate  amount of meals eaten  - Assist patient with eating if necessary   - Allow adequate time for meals  - Recommend/ encourage appropriate diets, oral nutritional supplements, and vitamin/mineral supplements  - Order, calculate, and assess calorie counts as needed  - Recommend, monitor, and adjust tube feedings and TPN/PPN based on assessed needs  - Assess need for intravenous fluids  - Provide specific nutrition/hydration education as appropriate  - Include patient/family/caregiver in decisions related to nutrition  Outcome: Progressing

## 2024-01-18 NOTE — PHYSICAL THERAPY NOTE
Physical Therapy Cancellation Note         01/18/24 0900   PT Last Visit   PT Visit Date 01/18/24   Note Type   Note Type Cancelled Session   Cancel Reasons Patient off floor/test  (PT will continue to follow)       TACO ARGUETA PT, DPT

## 2024-01-18 NOTE — PROGRESS NOTES
Spiritual Care Progress Note    2024  Patient: Berto Faria : 1970  Admission Date & Time: 2024 1326  MRN: [id number] [facility]: [id number]        Fr Rivero met with the pt and provided prayers and blessings. No further needs were expressed at this time.    Chaplains still remain available.       24 1230   Clinical Encounter Type   Visited With Patient   Congregational Encounters   Congregational Needs Prayer

## 2024-01-18 NOTE — ASSESSMENT & PLAN NOTE
Presented 23 to [facility] with new oxygen requirement, fatigue   Relevant imagin/7/24 CT Chest: Bilateral multilevel airspace consolidation, groundglass opacities, septal thickening and small bilateral pleural effusions secondary to volume overloaded as well as pneumonia.  Initial procalcitonin: 3.16 (24)  Possibly falsely elevated due to ESRD, but had lower levels in the past   Cultures:  24 BCXx2: No growth  23 COVID/Flu/RSV: Negative   23 RP2: Negative   24 MRSA Cx: Negative    24 BCXx2: No growth     Plan:   Completed Cefepime Course   Continue to monitor fever and WBC curve

## 2024-01-18 NOTE — PROGRESS NOTES
NEPHROLOGY PROGRESS NOTE   Berto Faria 53 y.o. male MRN: [id number]  Unit/Bed#: [facility] 515-01 Encounter: [id number]      ASSESSMENT & PLAN:  #1 ESRD on HD TTS at [facility].  Previously on CRRT due to hypotension and rapid A-fib, discontinued 01/17.  Plan for HD today following outpatient schedule, gentle UF as tolerated for low blood pressure, will access AV fistula    #2 new onset rapid A-fib, cardiology on board    #3  Recent shock, suspected septic versus cardiogenic as well as GI bleeding, acute HFrEF, overnight hypotensive received 1 unit PRBC as well as started on low-dose Levophed    #4 anemia in the setting of ESRD as well as GI bleeding with hematochezia status post EGD and flex sigmoidoscopy by GI status post clipping of duodenal ulcer.  Status post transfusion.  Monitor H&H, status post blood transfusion 01/17, hemoglobin up to 10.5 today    #5 history of failed kidney transplant, continue immunosuppression, it should be titrating down to off as per his outpatient nephrologist    #6 type II MI status post cardiac cath with in-stent restenosis of left circumflex and LAD stents, cardiology on board  Status post high risk Impella supported PCI today    #7 Access, AV fistula in place, also with temporary HD line    Recommendations were discussed with critical care team, plan for HD today 3 hours, will access AV fistula, gentle UF as tolerated given low blood pressure      ADDENDUM:  Patient was seen and examined again at 2:05 PM during dialysis treatment, his blood pressure is low but is stable, patient denies significant complaints, AV fistula cannulated with good blood flow, plan for even UF due to low blood pressure today, next dialysis treatment on Saturday following outpatient schedule.  Patient's wife is at bedside and she was updated about plan      SUBJECTIVE:  Seen and examined after returning from Cath Lab.  Status post high risk Impella supported PCI.  Currently feeling sore but denies any  other complaints, no chest pain, no shortness of breath, no abdominal pain, no nausea, no vomiting.  Plan for HD today.  Overnight hypotensive requiring 1 unit PRBC and started on low-dose Levophed      OBJECTIVE:  Current Weight: Weight - Scale: 48.9 kg (107 lb 12.9 oz)  Vitals:    01/18/24 1200   BP: 91/53   Pulse: 59   Resp: 18   Temp:    SpO2: 100%       Intake/Output Summary (Last 24 hours) at 1/18/2024 1235  Last data filed at 1/18/2024 0800  Gross per 24 hour   Intake 903.74 ml   Output 0 ml   Net 903.74 ml       General: conscious, cooperative, in not acute distress  Eyes: conjunctivae pale, anicteric sclerae  ENT: lips and mucous membranes moist  Neck: supple, no JVD  Chest: clear breath sounds bilateral, no crackles, ronchus or wheezings  CVS: distinct S1 & S2, normal rate, regular rhythm  Abdomen: soft, non-tender, non-distended, normoactive bowel sounds  Extremities: no edema of both legs, AV fistula in place, right AKA  Skin: no rash  Neuro: awake, alert, oriented  Temporary HD line accessed with good blood flow        Medications:    Current Facility-Administered Medications:     acetaminophen (TYLENOL) tablet 650 mg, 650 mg, Oral, Q6H PRN, Katie Grecsek, CRNP, 650 mg at 01/18/24 1214    albuterol (PROVENTIL HFA,VENTOLIN HFA) inhaler 2 puff, 2 puff, Inhalation, Q4H PRN, Katie Grecsek, CRNP    amiodarone tablet 200 mg, 200 mg, Oral, TID With Meals, Katie Grecsek, CRNP, 200 mg at 01/18/24 1204    [START ON 1/19/2024] apixaban (ELIQUIS) tablet 2.5 mg, 2.5 mg, Oral, BID, Katie Rose Mariecsek, CRNP    aspirin chewable tablet 81 mg, 81 mg, Oral, QAM, Katie Grecsek, CRNP, 81 mg at 01/18/24 0818    atorvastatin (LIPITOR) tablet 80 mg, 80 mg, Oral, Daily With Dinner, Katie Velasco, CRNP, 80 mg at 01/17/24 1709    calcitriol (ROCALTROL) capsule 0.5 mcg, 0.5 mcg, Oral, Once per day on Tuesday Thursday Saturday, GAVINO Baker, 0.5 mcg at 01/18/24 1204    calcium gluconate 2 g in sodium chloride 0.9%  100 mL (premix), 2 g, Intravenous, Once, Katie Grecsek, CRNP, Last Rate: 100 mL/hr at 01/18/24 1203, 2 g at 01/18/24 1203    cinacalcet (SENSIPAR) tablet 30 mg, 30 mg, Oral, Daily, Katie Grecsek, CRNP, 30 mg at 01/18/24 1206    clopidogrel (PLAVIX) tablet 75 mg, 75 mg, Oral, Daily, Katie Grecsek, CRNP, 75 mg at 01/18/24 0818    glycerin-hypromellose- (ARTIFICIAL TEARS) ophthalmic solution 1 drop, 1 drop, Right Eye, Q4H PRN, Katie Grecsek, CRNP    insulin glargine (LANTUS) subcutaneous injection 6 Units 0.06 mL, 6 Units, Subcutaneous, Q12H JAMARCUS, Katie Grecsek, CRNP, 6 Units at 01/17/24 2011    insulin lispro (HumaLOG) 100 units/mL subcutaneous injection 1-6 Units, 1-6 Units, Subcutaneous, HS, Katie Grecsek, CRNP, 1 Units at 01/17/24 2142    insulin lispro (HumaLOG) 100 units/mL subcutaneous injection 1-6 Units, 1-6 Units, Subcutaneous, TID With Meals, 2 Units at 01/17/24 1625 **AND** Fingerstick Glucose (POCT), , , TID AC, Katie Grecsek, CRNP    melatonin tablet 3 mg, 3 mg, Oral, HS, Katie Grecsek, CRNP, 3 mg at 01/17/24 2330    norepinephrine (LEVOPHED) 4 mg (STANDARD CONCENTRATION) IV in sodium chloride 0.9% 250 mL, 1-30 mcg/min, Intravenous, Titrated, Katie Grecsek, CRNP, Last Rate: 3.8 mL/hr at 01/18/24 0625, 1 mcg/min at 01/18/24 0625    ondansetron (ZOFRAN) injection 4 mg, 4 mg, Intravenous, Q8H PRN, Katie Grecsek, CRNP, 4 mg at 01/16/24 1330    pantoprazole (PROTONIX) injection 40 mg, 40 mg, Intravenous, Q12H JAMARCUS, Katie Grecsek, CRNP, 40 mg at 01/18/24 0623    predniSONE tablet 5 mg, 5 mg, Oral, Daily, Katie Krista, CRNP, 5 mg at 01/18/24 1206    senna-docusate sodium (SENOKOT S) 8.6-50 mg per tablet 1 tablet, 1 tablet, Oral, BID, Katie Velasco, BERLINNP    tacrolimus (PROGRAF) capsule 1 mg, 1 mg, Oral, QPM, Katie Velasco, CRNP, 1 mg at 01/17/24 1709    tacrolimus (PROGRAF) capsule 2 mg, 2 mg, Oral, QAM, Katie Velasco, CRNP, 2 mg at 01/18/24 1206    Invasive Devices:        Lab  "Results:   Results from last 7 days   Lab Units 01/18/24  0631 01/17/24  0603 01/17/24  0441 01/17/24  0030 01/16/24  2158 01/13/24  0901 01/13/24  0243 01/12/24  0844 01/12/24  0431   WBC Thousand/uL 15.12*  --  11.10* 11.20*  --    < >  --    < > 10.09   HEMOGLOBIN g/dL 10.5*  --  7.7* 7.6*  --    < >  --    < > 7.8*   HEMATOCRIT % 32.3*  --  25.0* 23.9*  --    < >  --    < > 22.9*   PLATELETS Thousands/uL 185  --  108* 107*  --    < >  --    < > 198   SODIUM mmol/L 134* 135  --   --  136   < >  --    < >  --    POTASSIUM mmol/L 5.0 5.0  --   --  4.9   < >  --    < >  --    CHLORIDE mmol/L 101 103  --   --  104   < >  --    < >  --    CO2 mmol/L 23 24  --   --  26   < >  --    < >  --    BUN mg/dL 40* 23  --   --  15   < >  --    < >  --    CREATININE mg/dL 4.55* 2.95*  --   --  2.30*   < >  --    < >  --    CALCIUM mg/dL 7.5* 8.4  --   --  8.4   < >  --    < >  --    MAGNESIUM mg/dL 2.4 2.2  --   --  2.2   < >  --    < > 2.1   PHOSPHORUS mg/dL 4.0 4.0  --   --  3.0   < >  --    < > 2.8   ALK PHOS U/L  --   --   --   --   --   --  56  --  57   ALT U/L  --   --   --   --   --   --  13  --  12   AST U/L  --   --   --   --   --   --  19  --  18    < > = values in this interval not displayed.       Previous work up:  See previous notes      Portions of the record may have been created with voice recognition software. Occasional wrong word or \"sound a like\" substitutions may have occurred due to the inherent limitations of voice recognition software. Read the chart carefully and recognize, using context, where substitutions have occurred.If you have any questions, please contact the dictating provider.  "

## 2024-01-18 NOTE — ASSESSMENT & PLAN NOTE
Dialysis regimen: Tuesday, Thursday, Saturday via right arm AV fistula   1/8/24: Right IJ temporary HD catheter placement   1/8/24: CRRT initiated   1/9/24: Tolerated -100 to -150 cc/hr volume removal   Re-initiation of CRRT 1/11-1/17    Plan:   Plan to transition back to IHD today  Cinacalcet 30mg daily   Nephrology consulted, appreciate recommendations

## 2024-01-18 NOTE — ASSESSMENT & PLAN NOTE
Acute blood loss anemia  with hemorrhagic shock present on 1/13 with large melanotic stools.  Patient does have history of GI bleed and previous hospitalization, required intervention in 2019 at [facility]  Received 3 units pRBC  1/14 EGD/flex sig: Significant amount of blood in the descending colon, sigmoid colon, and rectum.  No ischemic changes identified.  Single cratered ulcer in the first part of the duodenum with visible vessel, clips applied.    Plan:  Continue Protonix 40 BID  Continue to monitor frequent hemoglobin  Continue monitor hemodynamics, consider transfusion/hemoglobin recheck with any decline  GI following, appreciate their recommendations

## 2024-01-19 ENCOUNTER — APPOINTMENT (OUTPATIENT)
Dept: PHYSICAL THERAPY | Facility: CLINIC | Age: 54
End: 2024-01-19
Payer: MEDICARE

## 2024-01-19 LAB
ANION GAP SERPL CALCULATED.3IONS-SCNC: 10 MMOL/L
ATRIAL RATE: 65 BPM
BASOPHILS # BLD AUTO: 0.03 THOUSANDS/ÂΜL (ref 0–0.1)
BASOPHILS NFR BLD AUTO: 0 % (ref 0–1)
BUN SERPL-MCNC: 29 MG/DL (ref 5–25)
CA-I BLD-SCNC: 1.01 MMOL/L (ref 1.12–1.32)
CALCIUM SERPL-MCNC: 7.4 MG/DL (ref 8.4–10.2)
CHLORIDE SERPL-SCNC: 101 MMOL/L (ref 96–108)
CO2 SERPL-SCNC: 28 MMOL/L (ref 21–32)
CREAT SERPL-MCNC: 3.9 MG/DL (ref 0.6–1.3)
EOSINOPHIL # BLD AUTO: 0.09 THOUSAND/ÂΜL (ref 0–0.61)
EOSINOPHIL NFR BLD AUTO: 1 % (ref 0–6)
ERYTHROCYTE [DISTWIDTH] IN BLOOD BY AUTOMATED COUNT: 22.8 % (ref 11.6–15.1)
GFR SERPL CREATININE-BSD FRML MDRD: 16 ML/MIN/1.73SQ M
GLUCOSE SERPL-MCNC: 194 MG/DL (ref 65–140)
GLUCOSE SERPL-MCNC: 204 MG/DL (ref 65–140)
GLUCOSE SERPL-MCNC: 227 MG/DL (ref 65–140)
GLUCOSE SERPL-MCNC: 50 MG/DL (ref 65–140)
GLUCOSE SERPL-MCNC: 50 MG/DL (ref 65–140)
GLUCOSE SERPL-MCNC: 65 MG/DL (ref 65–140)
GLUCOSE SERPL-MCNC: 95 MG/DL (ref 65–140)
HCT VFR BLD AUTO: 28.5 % (ref 36.5–49.3)
HGB BLD-MCNC: 9.1 G/DL (ref 12–17)
IMM GRANULOCYTES # BLD AUTO: 0.09 THOUSAND/UL (ref 0–0.2)
IMM GRANULOCYTES NFR BLD AUTO: 1 % (ref 0–2)
KCT BLD-ACNC: 240 SEC (ref 89–137)
KCT BLD-ACNC: 246 SEC (ref 89–137)
LYMPHOCYTES # BLD AUTO: 2.6 THOUSANDS/ÂΜL (ref 0.6–4.47)
LYMPHOCYTES NFR BLD AUTO: 23 % (ref 14–44)
MAGNESIUM SERPL-MCNC: 2.1 MG/DL (ref 1.9–2.7)
MCH RBC QN AUTO: 32.3 PG (ref 26.8–34.3)
MCHC RBC AUTO-ENTMCNC: 31.9 G/DL (ref 31.4–37.4)
MCV RBC AUTO: 101 FL (ref 82–98)
MONOCYTES # BLD AUTO: 1.08 THOUSAND/ÂΜL (ref 0.17–1.22)
MONOCYTES NFR BLD AUTO: 9 % (ref 4–12)
NEUTROPHILS # BLD AUTO: 7.62 THOUSANDS/ÂΜL (ref 1.85–7.62)
NEUTS SEG NFR BLD AUTO: 66 % (ref 43–75)
NRBC BLD AUTO-RTO: 1 /100 WBCS
P AXIS: 63 DEGREES
PHOSPHATE SERPL-MCNC: 3.5 MG/DL (ref 2.7–4.5)
PLATELET # BLD AUTO: 157 THOUSANDS/UL (ref 149–390)
PMV BLD AUTO: 10.9 FL (ref 8.9–12.7)
POTASSIUM SERPL-SCNC: 4.3 MMOL/L (ref 3.5–5.3)
PR INTERVAL: 158 MS
QRS AXIS: -44 DEGREES
QRSD INTERVAL: 194 MS
QT INTERVAL: 550 MS
QTC INTERVAL: 572 MS
RBC # BLD AUTO: 2.82 MILLION/UL (ref 3.88–5.62)
SODIUM SERPL-SCNC: 139 MMOL/L (ref 135–147)
SPECIMEN SOURCE: ABNORMAL
SPECIMEN SOURCE: ABNORMAL
T WAVE AXIS: 135 DEGREES
VENTRICULAR RATE: 65 BPM
WBC # BLD AUTO: 11.51 THOUSAND/UL (ref 4.31–10.16)

## 2024-01-19 PROCEDURE — 85025 COMPLETE CBC W/AUTO DIFF WBC: CPT

## 2024-01-19 PROCEDURE — 80048 BASIC METABOLIC PNL TOTAL CA: CPT

## 2024-01-19 PROCEDURE — 82330 ASSAY OF CALCIUM: CPT

## 2024-01-19 PROCEDURE — 82948 REAGENT STRIP/BLOOD GLUCOSE: CPT

## 2024-01-19 PROCEDURE — 5A1D90Z PERFORMANCE OF URINARY FILTRATION, CONTINUOUS, GREATER THAN 18 HOURS PER DAY: ICD-10-PCS | Performed by: INTERNAL MEDICINE

## 2024-01-19 PROCEDURE — 99233 SBSQ HOSP IP/OBS HIGH 50: CPT | Performed by: ANESTHESIOLOGY

## 2024-01-19 PROCEDURE — NC001 PR NO CHARGE

## 2024-01-19 PROCEDURE — 99232 SBSQ HOSP IP/OBS MODERATE 35: CPT | Performed by: INTERNAL MEDICINE

## 2024-01-19 PROCEDURE — C9113 INJ PANTOPRAZOLE SODIUM, VIA: HCPCS

## 2024-01-19 PROCEDURE — 84100 ASSAY OF PHOSPHORUS: CPT

## 2024-01-19 PROCEDURE — 83735 ASSAY OF MAGNESIUM: CPT

## 2024-01-19 PROCEDURE — 99233 SBSQ HOSP IP/OBS HIGH 50: CPT | Performed by: INTERNAL MEDICINE

## 2024-01-19 PROCEDURE — 93010 ELECTROCARDIOGRAM REPORT: CPT | Performed by: INTERNAL MEDICINE

## 2024-01-19 RX ORDER — MIDODRINE HYDROCHLORIDE 5 MG/1
5 TABLET ORAL
Status: DISCONTINUED | OUTPATIENT
Start: 2024-01-19 | End: 2024-01-20 | Stop reason: HOSPADM

## 2024-01-19 RX ORDER — MIDODRINE HYDROCHLORIDE 5 MG/1
10 TABLET ORAL
Status: DISCONTINUED | OUTPATIENT
Start: 2024-01-19 | End: 2024-01-20 | Stop reason: HOSPADM

## 2024-01-19 RX ADMIN — SENNOSIDES, DOCUSATE SODIUM 1 TABLET: 8.6; 5 TABLET ORAL at 08:14

## 2024-01-19 RX ADMIN — CINACALCET 30 MG: 30 TABLET, FILM COATED ORAL at 08:14

## 2024-01-19 RX ADMIN — MIDODRINE HYDROCHLORIDE 10 MG: 5 TABLET ORAL at 15:35

## 2024-01-19 RX ADMIN — CALCIUM GLUCONATE 3 G: 98 INJECTION, SOLUTION INTRAVENOUS at 12:33

## 2024-01-19 RX ADMIN — TACROLIMUS 2 MG: 1 CAPSULE ORAL at 10:21

## 2024-01-19 RX ADMIN — INSULIN GLARGINE 3 UNITS: 100 INJECTION, SOLUTION SUBCUTANEOUS at 21:29

## 2024-01-19 RX ADMIN — INSULIN LISPRO 2 UNITS: 100 INJECTION, SOLUTION INTRAVENOUS; SUBCUTANEOUS at 11:00

## 2024-01-19 RX ADMIN — PREDNISONE 5 MG: 5 TABLET ORAL at 08:14

## 2024-01-19 RX ADMIN — CLOPIDOGREL BISULFATE 75 MG: 75 TABLET ORAL at 08:14

## 2024-01-19 RX ADMIN — AMIODARONE HYDROCHLORIDE 200 MG: 200 TABLET ORAL at 11:00

## 2024-01-19 RX ADMIN — APIXABAN 2.5 MG: 2.5 TABLET, FILM COATED ORAL at 08:14

## 2024-01-19 RX ADMIN — AMIODARONE HYDROCHLORIDE 200 MG: 200 TABLET ORAL at 07:37

## 2024-01-19 RX ADMIN — TACROLIMUS 1 MG: 1 CAPSULE ORAL at 17:22

## 2024-01-19 RX ADMIN — AMIODARONE HYDROCHLORIDE 200 MG: 200 TABLET ORAL at 15:35

## 2024-01-19 RX ADMIN — ATORVASTATIN CALCIUM 80 MG: 80 TABLET, FILM COATED ORAL at 15:35

## 2024-01-19 RX ADMIN — PANTOPRAZOLE SODIUM 40 MG: 40 INJECTION, POWDER, FOR SOLUTION INTRAVENOUS at 06:06

## 2024-01-19 RX ADMIN — ASPIRIN 81 MG CHEWABLE TABLET 81 MG: 81 TABLET CHEWABLE at 08:14

## 2024-01-19 RX ADMIN — INSULIN LISPRO 2 UNITS: 100 INJECTION, SOLUTION INTRAVENOUS; SUBCUTANEOUS at 15:35

## 2024-01-19 RX ADMIN — PANTOPRAZOLE SODIUM 40 MG: 40 INJECTION, POWDER, FOR SOLUTION INTRAVENOUS at 17:21

## 2024-01-19 RX ADMIN — APIXABAN 2.5 MG: 2.5 TABLET, FILM COATED ORAL at 17:21

## 2024-01-19 RX ADMIN — MIDODRINE HYDROCHLORIDE 10 MG: 5 TABLET ORAL at 10:46

## 2024-01-19 NOTE — OCCUPATIONAL THERAPY NOTE
Occupational Therapy d/c        Patient Name: Berto Faria  Today's Date: 1/19/2024 01/19/24 0849   OT Last Visit   OT Visit Date 01/19/24   Note Type   Note Type Treatment   Cancel Reasons Refusal       pt continues to defer therapy and reports he does not require acute OT services. States that he has been in hospital before, and plans to do his therapy once he is home. He has been performing OOB transfers without assistance, and continues to defer FM. Will d/c OT. pt continues to defer therapy and reports he does not require acute OT services. States that he has been in hospital before, and plans to do his therapy once he is home. He has been performing OOB transfers without assistance, and continues to defer FM. Will d/c OT.   DAMION Adames, OTR/L

## 2024-01-19 NOTE — RESULT ENCOUNTER NOTE
Inform patient via [employer].  Please review the pathology/lab result of further discussion.    Copied from [employer] message :       Helrosalba Berto,     Your stomach biopsies were unremarkable. No infection was seen.     Best regards,     Ramon Marcelino MD

## 2024-01-19 NOTE — PROGRESS NOTES
Critical Care Interval Transfer Note:    Please refer to progress note from earlier today for full details.     Barriers to discharge:   Removal of temporary dialysis catheter - plan for HD 1/20 via fistula and if tolerated well, HD catheter to be discontinued.   Titration of midodrine and/or antihypertensive regimen per cardiology and/or nephrology  CM for dispo  Patient declining PT/OT - signed off     Consults: IP CONSULT TO NUTRITION SERVICES  IP CONSULT TO NEPHROLOGY  IP CONSULT TO PHARMACY  IP CONSULT TO CARDIOTHORACIC SURGERY  IP CONSULT TO GASTROENTEROLOGY  IP CONSULT TO ENDOCRINOLOGY    Discharge Plan: Anticipate discharge in 48-72 hrs to home with home services.  Central access plan:  see above    Patient seen and evaluated by Critical Care today and deemed to be appropriate for transfer to [facility] with Telemetry. Spoke to Dr. Ritter from [facility] to accept transfer. Critical care can be contacted via [employer] with any questions or concerns.

## 2024-01-19 NOTE — ASSESSMENT & PLAN NOTE
Presented to [facility] ED with shortness of breath. Troponin and EKG checked as part of initial workup. EKG without acute ischemic changes   NSTEMI likely related to increased demand from acute hypoxic respiratory failure and septic shock   Troponin 22835 > 30547 > 45698   Cardiac Cath 1/12:Severe in-stent restenosis bifurcation ostial LAD/LCx,  RCA, elevated LVEDP, porcelain aorta.  CT Surgery Consulted-recommendations for supportive care or high risk PCI  1/18 Impella assisted PCI with MARC to ostial LAD and ostial Lcx     Plan:  ASA 81mg daily, Plavix 75 Daily   Atorvastatin 80mg qHS   Continue holding home afterload reduction agents/AV aubrie blockers given boarderline Blood Pressure    Carvedilol 6.25mg BID  Isosorbide mononitrate 30mg q24h   Metoprolol succinate 50mg q24  Nifedipine 30mg q24 hours   Cardiology consulted, appreciate recommendations

## 2024-01-19 NOTE — PHYSICAL THERAPY NOTE
Physical Therapy Cancellation Note         01/19/24 0930   PT Last Visit   PT Visit Date 01/19/24   Note Type   Note Type Cancelled Session   Cancel Reasons   (PT arrived to see pt for tx. Patient received OOB in chair. Per RN (Mariaelena) patient has not required assist with transfers out of bed. Patient denied need for anymore f/u PT this admission. Reports feeling comfortable with d/c home. D/C inpt PT.)     TACO ARGUETA PT, DPT

## 2024-01-19 NOTE — PROGRESS NOTES
[facility]  Progress Note  Name: Berto Faria I  MRN: [id number]  Unit/Bed#: [facility] 515-01 I Date of Admission: 1/8/2024   Date of Service: 1/19/2024 I Hospital Day: 11    Assessment/Plan   * NSTEMI (non-ST elevated myocardial infarction) (HCC)  Assessment & Plan  Presented to [facility] ED with shortness of breath. Troponin and EKG checked as part of initial workup. EKG without acute ischemic changes   NSTEMI likely related to increased demand from acute hypoxic respiratory failure and septic shock   Troponin 21018 > 30684 > 64116   Cardiac Cath 1/12:Severe in-stent restenosis bifurcation ostial LAD/LCx,  RCA, elevated LVEDP, porcelain aorta.  CT Surgery Consulted-recommendations for supportive care or high risk PCI  1/18 Impella assisted PCI with MARC to ostial LAD and ostial Lcx     Plan:  ASA 81mg daily, Plavix 75 Daily   Atorvastatin 80mg qHS   Continue holding home afterload reduction agents/AV aubrie blockers given boarderline Blood Pressure    Carvedilol 6.25mg BID  Isosorbide mononitrate 30mg q24h   Metoprolol succinate 50mg q24  Nifedipine 30mg q24 hours   Cardiology consulted, appreciate recommendations    GI bleed w/ Hemorrhagic Shock  Assessment & Plan  Acute blood loss anemia  with hemorrhagic shock present on 1/13 with large melanotic stools.  Patient does have history of GI bleed and previous hospitalization, required intervention in 2019 at [facility]  Received 3 units pRBC  1/14 EGD/flex sig: Significant amount of blood in the descending colon, sigmoid colon, and rectum.  No ischemic changes identified.  Single cratered ulcer in the first part of the duodenum with visible vessel, clips applied.    Plan:  Continue Protonix 40 BID  Continue to monitor frequent hemoglobin  Continue monitor hemodynamics, consider transfusion/hemoglobin recheck with any decline  GI following, appreciate their recommendations    New onset a-fib (HCC)  Assessment & Plan  New-onset in AM of  . With occasional RVR with associated hypotension. Amiodarone bolus x1 given at RRT while in dialysis  AM.    Plan:  Currently NSR  Amiodarone 200 TID  Restart heparin drip    Acute on chronic diastolic (congestive) heart failure (HCC)  Assessment & Plan  Likely related to NSTEMI from septic shock and element of stress cardiomyopathy with  possible ischemic element given regional wall changes   24 TTE: LV cavity size is normal. Wall thickness is mildly increased. LVEF 40%. Systolic function is moderately reduced. Following segments are hypokinetic: mid anteroseptal, mid inferior, apical anterior, apical septal, apical inferior and apex.RV normal. Mild MR. Mild TR    Plan:   Volume removal with HD  Daily weights  Continue holding home afterload reduction agents/AV aubrie blockers given resolving septic/ cardiogenic/ hemorrhagic  shock:   Carvedilol 6.25mg BID  Isosorbide mononitrate 30mg q24h   Metoprolol succinate 50mg q24  Nifedipine 30mg q24 hours   Hypotensive requiring low-dose levophed - consider adding midodrine   Meds as above for NSTEMI  Cardiology consulted, appreciate recommendations      Multifocal pneumonia  Assessment & Plan  Presented 23 to [facility] with new oxygen requirement, fatigue   Relevant imagin/7/24 CT Chest: Bilateral multilevel airspace consolidation, groundglass opacities, septal thickening and small bilateral pleural effusions secondary to volume overloaded as well as pneumonia.  Initial procalcitonin: 3.16 (24)  Possibly falsely elevated due to ESRD, but had lower levels in the past   Cultures:  24 BCXx2: No growth  23 COVID/Flu/RSV: Negative   23 RP2: Negative   24 MRSA Cx: Negative    24 BCXx2: No growth     Plan:   Completed Cefepime Course   Continue to monitor fever and WBC curve       ESRD (end stage renal disease) (HCC)  Assessment & Plan  Dialysis regimen: Tuesday, Thursday, Saturday via right arm AV fistula   24: Right IJ temporary HD  catheter placement   1/8/24: CRRT initiated   1/9/24: Tolerated -100 to -150 cc/hr volume removal   Re-initiation of CRRT 1/11-1/17  Transitioned back to IHD 1/18    Plan:   Plan for HD on Saturday  Cinacalcet 30mg daily   Nephrology consulted, appreciate recommendations    Type 1 diabetes mellitus on insulin therapy (HCC)  Assessment & Plan  Lab Results   Component Value Date    HGBA1C 7.7 (H) 01/07/2024    HGBA1C 6.5 (H) 02/23/2018       Plan:   Continue AC/HS sliding scale algorithm   Lantus 6 units q12h   Adjust insulin regimen as needed to maintain goal -180  Carb controlled diet   Patient intermittently non-complaint with Insulin     Renal transplant failure and rejection  Assessment & Plan  History of renal transplant chronically on tacrolimus 1 mg p.o. nightly, 2 mg p.o. every morning, AZA 50 daily, Prednisone 5 Daily    Plan:  Continue tacrolimus 2mg qAM, 1mg qPM  Prednisone 5 mg qD  Holding home azathioprine 50mg daily   Nephrology consulted, appreciate recommendations   Reach out to transplant center for update regarding these medications    Acute on chronic anemia  Assessment & Plan  Secondary to chronic disease and ESRD  Baseline hemoglobin: 9.6 - 11.8  Acute hypotension anemia on 1/13 requiring transfusion  Blood products:  1/13: 2 unit PRBC CMV negative, irradiated  1/14 1u pRBC  1/17 1u PRBC    Plan:   Transfuse as indicated in the setting of hemodynamic instability or signs of active bleeding  Monitor Frequent Hg  Plan as above due to GIB    S/P AKA (above knee amputation), right (HCC)  Assessment & Plan  Secondary to chronic osteomyelitis     Plan:   PT/OT when appropriate   Offloading and frequent turns              Disposition: Stepdown Level 2    ICU Core Measures     A: Assess, Prevent, and Manage Pain Has pain been assessed? Yes  Need for changes to pain regimen? No   B: Both SAT/SAT  N/A   C: Choice of Sedation RASS Goal: N/A patient not on sedation  Need for changes to sedation or  analgesia regimen? No   D: Delirium CAM-ICU: Negative   E: Early Mobility  Plan for early mobility? Yes   F: Family Engagement Plan for family engagement today? Yes       Review of Invasive Devices:      Central access plan: HD cath in place.  Plan remove if tolerates next HD session      Prophylaxis:  VTE VTE covered by:  apixaban, Oral       Stress Ulcer  covered bypantoprazole (PROTONIX) 40 mg tablet [[id number]] (Long-Term Med), pantoprazole (PROTONIX) injection 40 mg [[id number]]         Significant 24hr Events     24hr events: Underwent successful impella assisted PCI. Tolerated HD. Off levophed currently.      Subjective   Review of Systems   Constitutional:  Positive for fatigue.   Respiratory:  Negative for shortness of breath.    Cardiovascular:  Negative for chest pain.   Gastrointestinal:  Negative for abdominal pain.      Objective                            Vitals I/O      Most Recent Min/Max in 24hrs   Temp 98.6 °F (37 °C) Temp  Min: 97.5 °F (36.4 °C)  Max: 98.6 °F (37 °C)   Pulse 67 Pulse  Min: 50  Max: 77   Resp 14 Resp  Min: 14  Max: 18   BP (!) 80/48 BP  Min: 75/46  Max: 107/59   O2 Sat 100 % SpO2  Min: 96 %  Max: 100 %      Intake/Output Summary (Last 24 hours) at 1/19/2024 0317  Last data filed at 1/18/2024 1800  Gross per 24 hour   Intake 722.16 ml   Output 500 ml   Net 222.16 ml       Diet Cardiovascular; Cardiac; Consistent Carbohydrate Diet Level 3 (6 carb servings/90 grams CHO/meal)    Invasive Monitoring           Physical Exam   Physical Exam  Skin:     General: Skin is warm and dry.      Capillary Refill: Capillary refill takes 2 to 3 seconds.      Coloration: Skin is pale.   HENT:      Head: Atraumatic.      Mouth/Throat:      Mouth: Mucous membranes are moist.   Cardiovascular:      Rate and Rhythm: Normal rate and regular rhythm.      Comments: Right groin access site without hematoma  Musculoskeletal:      Right Lower Extremity: Right leg is amputated above knee.   Abdominal: General:  There is no distension.      Palpations: Abdomen is soft.      Tenderness: There is no abdominal tenderness.   Constitutional:       General: He is not in acute distress.  Pulmonary:      Effort: Pulmonary effort is normal.      Breath sounds: No wheezing, rhonchi or rales.   Neurological:      General: No focal deficit present.      Mental Status: He is alert and oriented to person, place and time.            Diagnostic Studies      EKG: NSR  Imaging: Cardiac cath I have personally reviewed pertinent reports.       Medications:  Scheduled PRN   amiodarone, 200 mg, TID With Meals  apixaban, 2.5 mg, BID  aspirin, 81 mg, QAM  atorvastatin, 80 mg, Daily With Dinner  calcitriol, 0.5 mcg, Once per day on Tuesday Thursday Saturday  cinacalcet, 30 mg, Daily  clopidogrel, 75 mg, Daily  insulin glargine, 6 Units, Q12H JAMARCUS  insulin lispro, 1-6 Units, HS  insulin lispro, 1-6 Units, TID With Meals  melatonin, 3 mg, HS  pantoprazole, 40 mg, Q12H JAMARCUS  predniSONE, 5 mg, Daily  senna-docusate sodium, 1 tablet, BID  tacrolimus, 1 mg, QPM  tacrolimus, 2 mg, QAM      acetaminophen, 650 mg, Q6H PRN  albuterol, 2 puff, Q4H PRN  glycerin-hypromellose-, 1 drop, Q4H PRN  ondansetron, 4 mg, Q8H PRN  trimethobenzamide, 200 mg, Q6H PRN       Continuous    norepinephrine, 1-30 mcg/min, Last Rate: Stopped (01/18/24 1423)         Labs:    CBC    Recent Labs     01/17/24  0441 01/18/24  0631   WBC 11.10* 15.12*   HGB 7.7* 10.5*   HCT 25.0* 32.3*   * 185     BMP    Recent Labs     01/17/24  0603 01/18/24  0631   SODIUM 135 134*   K 5.0 5.0    101   CO2 24 23   AGAP 8 10   BUN 23 40*   CREATININE 2.95* 4.55*   CALCIUM 8.4 7.5*       Coags    Recent Labs     01/17/24  2141 01/18/24  0631   PTT >210* 87*        Additional Electrolytes  Recent Labs     01/17/24  0603 01/18/24  0631   MG 2.2 2.4   PHOS 4.0 4.0   CAIONIZED 1.14 1.04*          Blood Gas    No recent results  No recent results LFTs  No recent results    Infectious  No  recent results  Glucose  Recent Labs     01/17/24  0603 01/18/24  0631   GLUC 216* 141*                   Mya Rios PA-C

## 2024-01-19 NOTE — ASSESSMENT & PLAN NOTE
Presented 23 to [facility] with new oxygen requirement, fatigue   Relevant imagin/7/24 CT Chest: Bilateral multilevel airspace consolidation, groundglass opacities, septal thickening and small bilateral pleural effusions secondary to volume overloaded as well as pneumonia.  Initial procalcitonin: 3.16 (24)  Possibly falsely elevated due to ESRD, but had lower levels in the past   Cultures:  24 BCXx2: No growth  23 COVID/Flu/RSV: Negative   23 RP2: Negative   24 MRSA Cx: Negative    24 BCXx2: No growth     Plan:   Completed Cefepime Course   Continue to monitor fever and WBC curve

## 2024-01-19 NOTE — PROGRESS NOTES
Cardiology Team 2 Progress Note - Berto Faria 53 y.o. male MRN: [id number]  Unit/Bed#: [facility] 515-01 Encounter: [id number]      Subjective:   Patient seen and examined.  He is satting well on room air, has no acute complaints and reports feeling better.  Overnight, he briefly required Levophed for pressure support, now he is off pressor support.  Tolerated PCI well yesterday, Impella assisted.  Drop in hemoglobin noted, patient's close to his baseline hemoglobin level at this time.    Plan:  Continue with Plavix, aspirin and Eliquis for at least 7 days  Kuldeep has an appointment with his primary cardiologist next Thursday on 1/25.  Patient should continue with triple therapy until his visit, at that time he can potentially continue with dual antiplatelet on Plavix and Eliquis.  Decision deferred to Dr. Shen, his PCV.   CRRT as per nephrology- agree with midodrine before HD  Eliquis 2.5mg BID adjusted dose( ESRD and weight < 60kg)       Assessment:  Berto Faria is a 53 y.o. male with a PMH of HFrEF, ESRD, DM1, CAD w/ prior PEA arrest, Hx of R AKA, Afib who presents as an xfer from [facility] for continued management of NSTEMI and cardiogenic and septic shock. S/p LHC showing stent stenosis of Lcx + LAD stents, RCA . Acute drop of Hb to 4.9, s/p 3u pRBC w/ adequate response, due to duodenal bleeding now s/p clip x1 with ovesco clip w/ hemostasis.     Principal Problem:    NSTEMI (non-ST elevated myocardial infarction) ([facility])  Active Problems:    Type 1 diabetes mellitus on insulin therapy ([facility])    Acidosis    GI bleed w/ Hemorrhagic Shock    S/P AKA (above knee amputation), right ([facility])    Acute on chronic anemia    ESRD (end stage renal disease) ([facility])    Coronary artery disease involving native coronary artery    Multifocal pneumonia    Acute on chronic diastolic (congestive) heart failure ([facility])    Renal transplant failure and rejection    New onset a-fib ([facility])      Assessment:  1.Shock -  septic/cardiogenic/hemorrhagic ; now resolved   - Presented as a transfer from [facility] as NSTEMI, developed cardio- and distributive- shock  -Status: SCAI stage B-C, warm without evidence of endorgan damage  - TTE(1/11/24): LVIDD 5 cm, eccentric hypertrophy, LVEF 32%, severe global hypokinesis with wall motion abnormalities in anteroseptal, inferior and apical walls.  Mild to moderate MR, mild to moderate TR with eRVSP of 57.   - [facility](1/12/24): Right femoral access- Ostial LAD 95%, ostial circumflex to proximal circumflex 75%, proximal %, mid LAD 60%, distal left main 50%, first marginal 100%.  Severe in-stent restenosis at the bifurcation of ostial LAD/left circumflex; moderately elevated LVEDP  - CABG turn down   - [facility]( 1/18): Impella assisted PCI PCI to os LAD and os LCX bifurcation. Tolerated well.   - Briefly required levophed for pressor support but now back to normal.     2.Paroxysmal atrial fibrillation  -   New diagnosis of A-fib, on 1/11, occasional RVR with associated hypotension.  - Now in NSR on PO Amiodarone   - Eliquis 2.5mg BID( ESRD and weight <60kg)     3.Acute HFrEF-   -In setting of NSTEMI and septic shock superimposed on ischemic cardiomyopathy  -Preload: On CRRT  -Afterload: Was taken off pressor support today, will monitor for 24 hours prior to resuming home meds   -Home medication: Coreg 6.25 mg twice daily, isosorbide mononitrate 30 mg daily, metoprolol succinate 50 mg daily, nifedipine 30 mg daily    4.Multifocal pneumonia-resolved  5.Cardiomyopathy - ischemic - EF 30%  6.Acute respiratory failure with hypoxia  7.Type 2 myocardial infarction secondary to #2/4  8.ESRD on HD - current on CRRT  9.Renal transplant failure/rejection   10.Right AKA secondary to osteomyelitis   11.Type 1 diabetes mellitus  12.duodenal ulcer-now status post x 1 ovesco clip(1/13/2024)        Case discussed and reviewed with Dr. Murray who agrees with my assessment and plan.Thank you for involving us in the  "care of your patient.    Rodger Crespo MD  Cardiology Fellow   PGY-5      Vitals: Blood pressure (!) 88/51, pulse 80, temperature (!) 97.4 °F (36.3 °C), temperature source Axillary, resp. rate 16, height 5' 4\" (1.626 m), weight (!) 0.048 kg (1.7 oz), SpO2 94%., Body mass index is 0.02 kg/m².,   Orthostatic Blood Pressures      Flowsheet Row Most Recent Value   Blood Pressure 88/51 filed at 01/19/2024 0900   Patient Position - Orthostatic VS Sitting filed at 01/19/2024 0900              Intake/Output Summary (Last 24 hours) at 1/19/2024 1001  Last data filed at 1/19/2024 0800  Gross per 24 hour   Intake 994.26 ml   Output 500 ml   Net 494.26 ml       Review of System:  Review of system was conducted and was negative except for as stated in the subjective course.    Physical Exam:    GEN: Berto Faria appears well, alert and oriented x 3, pleasant and cooperative   NECK: No JVD or carotid bruits; right HD catheter  HEART: Regular rhythm, normal rate, normal S1 and S2, no murmurs, clicks, gallops or rubs   LUNGS: Clear to auscultation bilaterally; no wheezes, rales, or rhonchi; respiration nonlabored   ABDOMEN:  Normoactive bowel sounds, soft, no tenderness, no distention  EXTREMITIES: Right AKA. Right femoral access is clean and dry, mild tenderness.         Current Facility-Administered Medications:     acetaminophen (TYLENOL) tablet 650 mg, 650 mg, Oral, Q6H PRN, Katie Grecsek, CRNP, 650 mg at 01/18/24 1214    albuterol (PROVENTIL HFA,VENTOLIN HFA) inhaler 2 puff, 2 puff, Inhalation, Q4H PRN, Katie Grecsek, CRNP    amiodarone tablet 200 mg, 200 mg, Oral, TID With Meals, Katie Grecsek, CRNP, 200 mg at 01/19/24 0737    apixaban (ELIQUIS) tablet 2.5 mg, 2.5 mg, Oral, BID, Katie Grecsek, CRNP, 2.5 mg at 01/19/24 0814    aspirin chewable tablet 81 mg, 81 mg, Oral, Katie CAMPBELL CRNP, 81 mg at 01/19/24 0814    atorvastatin (LIPITOR) tablet 80 mg, 80 mg, Oral, Daily With Dinner, Katie Velasco, " CRNP, 80 mg at 01/18/24 1829    calcitriol (ROCALTROL) capsule 0.5 mcg, 0.5 mcg, Oral, Once per day on Tuesday Thursday Saturday, Katie Velasco, CRNP, 0.5 mcg at 01/16/24 0800    cinacalcet (SENSIPAR) tablet 30 mg, 30 mg, Oral, Daily, Katie Rose Mariecsek, CRNP, 30 mg at 01/19/24 0814    clopidogrel (PLAVIX) tablet 75 mg, 75 mg, Oral, Daily, Katie Grecsek, CRNP, 75 mg at 01/19/24 0814    glycerin-hypromellose- (ARTIFICIAL TEARS) ophthalmic solution 1 drop, 1 drop, Right Eye, Q4H PRN, Katie Jaramilloek, CRNP    insulin glargine (LANTUS) subcutaneous injection 6 Units 0.06 mL, 6 Units, Subcutaneous, Q12H JAMARCUS, Katie Velasco CRNP, 6 Units at 01/17/24 2011    insulin lispro (HumaLOG) 100 units/mL subcutaneous injection 1-6 Units, 1-6 Units, Subcutaneous, HS, Katie Jaramilloek, CRNP, 1 Units at 01/17/24 2142    insulin lispro (HumaLOG) 100 units/mL subcutaneous injection 1-6 Units, 1-6 Units, Subcutaneous, TID With Meals, 2 Units at 01/17/24 1625 **AND** Fingerstick Glucose (POCT), , , TID AC, Katie Jaramilloek CRNP    melatonin tablet 3 mg, 3 mg, Oral, HS, Katie Rose Mariecsek, CRNP, 3 mg at 01/17/24 2330    ondansetron (ZOFRAN) injection 4 mg, 4 mg, Intravenous, Q8H PRN, Katie Trottercsek, CRNP, 4 mg at 01/16/24 1330    pantoprazole (PROTONIX) injection 40 mg, 40 mg, Intravenous, Q12H JAMARCUS, Katie Rose Mariecsek, CRNP, 40 mg at 01/19/24 0606    predniSONE tablet 5 mg, 5 mg, Oral, Daily, Katie Rose Mariecsek, CRNP, 5 mg at 01/19/24 0814    senna-docusate sodium (SENOKOT S) 8.6-50 mg per tablet 1 tablet, 1 tablet, Oral, BID, Katie Krista, CRNP, 1 tablet at 01/19/24 0814    tacrolimus (PROGRAF) capsule 1 mg, 1 mg, Oral, QPM, Katie Grecsek, CRNP, 1 mg at 01/18/24 1830    tacrolimus (PROGRAF) capsule 2 mg, 2 mg, Oral, QAM, Katie Krista, CRNP, 2 mg at 01/17/24 0803    trimethobenzamide (TIGAN) IM injection 200 mg, 200 mg, Intramuscular, Q6H PRN, Misael Savage MD    Labs & Results:            Results from last 7 days   Lab Units  01/19/24  0410 01/18/24  0631 01/17/24  0603   POTASSIUM mmol/L 4.3 5.0 5.0   CO2 mmol/L 28 23 24   CHLORIDE mmol/L 101 101 103   BUN mg/dL 29* 40* 23   CREATININE mg/dL 3.90* 4.55* 2.95*     Results from last 7 days   Lab Units 01/19/24  0410 01/18/24  0631 01/17/24  0441   HEMOGLOBIN g/dL 9.1* 10.5* 7.7*   HEMATOCRIT % 28.5* 32.3* 25.0*   PLATELETS Thousands/uL 157 185 108*           Telemetry:   Personally reviewed by Rodger Crespo MD: NSR

## 2024-01-19 NOTE — ASSESSMENT & PLAN NOTE
Dialysis regimen: Tuesday, Thursday, Saturday via right arm AV fistula   1/8/24: Right IJ temporary HD catheter placement   1/8/24: CRRT initiated   1/9/24: Tolerated -100 to -150 cc/hr volume removal   Re-initiation of CRRT 1/11-1/17  Transitioned back to IHD 1/18    Plan:   Plan for HD on Saturday  Cinacalcet 30mg daily   Nephrology consulted, appreciate recommendations

## 2024-01-19 NOTE — PLAN OF CARE
Problem: Prexisting or High Potential for Compromised Skin Integrity  Goal: Skin integrity is maintained or improved  Description: INTERVENTIONS:  - Identify patients at risk for skin breakdown  - Assess and monitor skin integrity  - Assess and monitor nutrition and hydration status  - Monitor labs   - Assess for incontinence   - Turn and reposition patient  - Assist with mobility/ambulation  - Relieve pressure over bony prominences  - Avoid friction and shearing  - Provide appropriate hygiene as needed including keeping skin clean and dry  - Evaluate need for skin moisturizer/barrier cream  - Collaborate with interdisciplinary team   - Patient/family teaching  - Consider wound care consult   Outcome: Progressing     Problem: PAIN - ADULT  Goal: Verbalizes/displays adequate comfort level or baseline comfort level  Description: Interventions:  - Encourage patient to monitor pain and request assistance  - Assess pain using appropriate pain scale  - Administer analgesics based on type and severity of pain and evaluate response  - Implement non-pharmacological measures as appropriate and evaluate response  - Consider cultural and social influences on pain and pain management  - Notify physician/advanced practitioner if interventions unsuccessful or patient reports new pain  Outcome: Progressing     Problem: INFECTION - ADULT  Goal: Absence or prevention of progression during hospitalization  Description: INTERVENTIONS:  - Assess and monitor for signs and symptoms of infection  - Monitor lab/diagnostic results  - Monitor all insertion sites, i.e. indwelling lines, tubes, and drains  - Monitor endotracheal if appropriate and nasal secretions for changes in amount and color  - [place] appropriate cooling/warming therapies per order  - Administer medications as ordered  - Instruct and encourage patient and family to use good hand hygiene technique  - Identify and instruct in appropriate isolation precautions for  identified infection/condition  Outcome: Progressing  Goal: Absence of fever/infection during neutropenic period  Description: INTERVENTIONS:  - Monitor WBC    Outcome: Progressing     Problem: SAFETY ADULT  Goal: Patient will remain free of falls  Description: INTERVENTIONS:  - Educate patient/family on patient safety including physical limitations  - Instruct patient to call for assistance with activity   - Consult OT/PT to assist with strengthening/mobility   - Keep Call bell within reach  - Keep bed low and locked with side rails adjusted as appropriate  - Keep care items and personal belongings within reach  - Initiate and maintain comfort rounds  - Make Fall Risk Sign visible to staff  - Apply yellow socks and bracelet for high fall risk patients  - Consider moving patient to room near nurses station  Outcome: Progressing  Goal: Maintain or return to baseline ADL function  Description: INTERVENTIONS:  -  Assess patient's ability to carry out ADLs; assess patient's baseline for ADL function and identify physical deficits which impact ability to perform ADLs (bathing, care of mouth/teeth, toileting, grooming, dressing, etc.)  - Assess/evaluate cause of self-care deficits   - Assess range of motion  - Assess patient's mobility; develop plan if impaired  - Assess patient's need for assistive devices and provide as appropriate  - Encourage maximum independence but intervene and supervise when necessary  - Involve family in performance of ADLs  - Assess for home care needs following discharge   - Consider OT consult to assist with ADL evaluation and planning for discharge  - Provide patient education as appropriate  Outcome: Progressing  Goal: Maintains/Returns to pre admission functional level  Description: INTERVENTIONS:  - Perform AM-PAC 6 Click Basic Mobility/ Daily Activity assessment daily.  - Set and communicate daily mobility goal to care team and patient/family/caregiver.   - Collaborate with rehabilitation  services on mobility goals if consulted  - Out of bed for toileting  - Record patient progress and toleration of activity level   Outcome: Progressing     Problem: DISCHARGE PLANNING  Goal: Discharge to home or other facility with appropriate resources  Description: INTERVENTIONS:  - Identify barriers to discharge w/patient and caregiver  - Arrange for needed discharge resources and transportation as appropriate  - Identify discharge learning needs (meds, wound care, etc.)  - Arrange for interpretive services to assist at discharge as needed  - Refer to Case Management Department for coordinating discharge planning if the patient needs post-hospital services based on physician/advanced practitioner order or complex needs related to functional status, cognitive ability, or social support system  Outcome: Progressing     Problem: Knowledge Deficit  Goal: Patient/family/caregiver demonstrates understanding of disease process, treatment plan, medications, and discharge instructions  Description: Complete learning assessment and assess knowledge base.  Interventions:  - Provide teaching at level of understanding  - Provide teaching via preferred learning methods  Outcome: Progressing     Problem: NEUROSENSORY - ADULT  Goal: Achieves stable or improved neurological status  Description: INTERVENTIONS  - Monitor and report changes in neurological status  - Monitor vital signs such as temperature, blood pressure, glucose, and any other labs ordered   - Initiate measures to prevent increased intracranial pressure  - Monitor for seizure activity and implement precautions if appropriate      Outcome: Progressing  Goal: Remains free of injury related to seizures activity  Description: INTERVENTIONS  - Maintain airway, patient safety  and administer oxygen as ordered  - Monitor patient for seizure activity, document and report duration and description of seizure to physician/advanced practitioner  - If seizure occurs,  ensure  patient safety during seizure  - Reorient patient post seizure  - Seizure pads on all 4 side rails  - Instruct patient/family to notify RN of any seizure activity including if an aura is experienced  - Instruct patient/family to call for assistance with activity based on nursing assessment  - Administer anti-seizure medications if ordered    Outcome: Progressing  Goal: Achieves maximal functionality and self care  Description: INTERVENTIONS  - Monitor swallowing and airway patency with patient fatigue and changes in neurological status  - Encourage and assist patient to increase activity and self care.   - Encourage visually impaired, hearing impaired and aphasic patients to use assistive/communication devices  Outcome: Progressing     Problem: CARDIOVASCULAR - ADULT  Goal: Maintains optimal cardiac output and hemodynamic stability  Description: INTERVENTIONS:  - Monitor I/O, vital signs and rhythm  - Monitor for S/S and trends of decreased cardiac output  - Administer and titrate ordered vasoactive medications to optimize hemodynamic stability  - Assess quality of pulses, skin color and temperature  - Assess for signs of decreased coronary artery perfusion  - Instruct patient to report change in severity of symptoms  Outcome: Progressing  Goal: Absence of cardiac dysrhythmias or at baseline rhythm  Description: INTERVENTIONS:  - Continuous cardiac monitoring, vital signs, obtain 12 lead EKG if ordered  - Administer antiarrhythmic and heart rate control medications as ordered  - Monitor electrolytes and administer replacement therapy as ordered  Outcome: Progressing     Problem: RESPIRATORY - ADULT  Goal: Achieves optimal ventilation and oxygenation  Description: INTERVENTIONS:  - Assess for changes in respiratory status  - Assess for changes in mentation and behavior  - Position to facilitate oxygenation and minimize respiratory effort  - Oxygen administered by appropriate delivery if ordered  - Initiate smoking  cessation education as indicated  - Encourage broncho-pulmonary hygiene including cough, deep breathe, Incentive Spirometry  - Assess the need for suctioning and aspirate as needed  - Assess and instruct to report SOB or any respiratory difficulty  - Respiratory Therapy support as indicated  Outcome: Progressing     Problem: GASTROINTESTINAL - ADULT  Goal: Minimal or absence of nausea and/or vomiting  Description: INTERVENTIONS:  - Administer IV fluids if ordered to ensure adequate hydration  - Maintain NPO status until nausea and vomiting are resolved  - Nasogastric tube if ordered  - Administer ordered antiemetic medications as needed  - Provide nonpharmacologic comfort measures as appropriate  - Advance diet as tolerated, if ordered  - Consider nutrition services referral to assist patient with adequate nutrition and appropriate food choices  Outcome: Progressing  Goal: Maintains or returns to baseline bowel function  Description: INTERVENTIONS:  - Assess bowel function  - Encourage oral fluids to ensure adequate hydration  - Administer IV fluids if ordered to ensure adequate hydration  - Administer ordered medications as needed  - Encourage mobilization and activity  - Consider nutritional services referral to assist patient with adequate nutrition and appropriate food choices  Outcome: Progressing  Goal: Maintains adequate nutritional intake  Description: INTERVENTIONS:  - Monitor percentage of each meal consumed  - Identify factors contributing to decreased intake, treat as appropriate  - Assist with meals as needed  - Monitor I&O, weight, and lab values if indicated  - Obtain nutrition services referral as needed  Outcome: Progressing  Goal: Establish and maintain optimal ostomy function  Description: INTERVENTIONS:  - Assess bowel function  - Encourage oral fluids to ensure adequate hydration  - Administer IV fluids if ordered to ensure adequate hydration   - Administer ordered medications as needed  -  Encourage mobilization and activity  - Nutrition services referral to assist patient with appropriate food choices  - Assess stoma site  - Consider wound care consult   Outcome: Progressing  Goal: Oral mucous membranes remain intact  Description: INTERVENTIONS  - Assess oral mucosa and hygiene practices  - Implement preventative oral hygiene regimen  - Implement oral medicated treatments as ordered  - Initiate Nutrition services referral as needed  Outcome: Progressing     Problem: GENITOURINARY - ADULT  Goal: Maintains or returns to baseline urinary function  Description: INTERVENTIONS:  - Assess urinary function  - Encourage oral fluids to ensure adequate hydration if ordered  - Administer IV fluids as ordered to ensure adequate hydration  - Administer ordered medications as needed  - Offer frequent toileting  - Follow urinary retention protocol if ordered  Outcome: Progressing  Goal: Absence of urinary retention  Description: INTERVENTIONS:  - Assess patient’s ability to void and empty bladder  - Monitor I/O  - Bladder scan as needed  - Discuss with physician/AP medications to alleviate retention as needed  - Discuss catheterization for long term situations as appropriate  Outcome: Progressing     Problem: METABOLIC, FLUID AND ELECTROLYTES - ADULT  Goal: Electrolytes maintained within normal limits  Description: INTERVENTIONS:  - Monitor labs and assess patient for signs and symptoms of electrolyte imbalances  - Administer electrolyte replacement as ordered  - Monitor response to electrolyte replacements, including repeat lab results as appropriate  - Instruct patient on fluid and nutrition as appropriate  Outcome: Progressing  Goal: Fluid balance maintained  Description: INTERVENTIONS:  - Monitor labs   - Monitor I/O and WT  - Instruct patient on fluid and nutrition as appropriate  - Assess for signs & symptoms of volume excess or deficit  Outcome: Progressing  Goal: Glucose maintained within target  range  Description: INTERVENTIONS:  - Monitor Blood Glucose as ordered  - Assess for signs and symptoms of hyperglycemia and hypoglycemia  - Administer ordered medications to maintain glucose within target range  - Assess nutritional intake and initiate nutrition service referral as needed  Outcome: Progressing     Problem: SKIN/TISSUE INTEGRITY - ADULT  Goal: Skin Integrity remains intact(Skin Breakdown Prevention)  Description: Assess:  -Inspect skin when repositioning, toileting, and assisting with ADLS  -Assess extremities for adequate circulation and sensation     Bed Management:  -Have minimal linens on bed & keep smooth, unwrinkled  -Change linens as needed when moist or perspiring    Toileting:  -Offer bedside commode    Activity:  -Encourage activity and walks on unit  -Encourage or provide ROM exercises   -Use appropriate equipment to lift or move patient in bed    Skin Care:  -Avoid use of baby powder, tape, friction and shearing, hot water or constrictive clothing  -Do not massage red bony areas    Outcome: Progressing  Goal: Incision(s), wounds(s) or drain site(s) healing without S/S of infection  Description: INTERVENTIONS  - Assess and document dressing, incision, wound bed, drain sites and surrounding tissue  - Provide patient and family education  Outcome: Progressing  Goal: Pressure injury heals and does not worsen  Description: Interventions:  - Implement low air loss mattress or specialty surface (Criteria met)  - Apply silicone foam dressing   - Apply fecal or urinary incontinence containment device   - Utilize friction reducing device or surface for transfers   - Consider nutrition services referral as needed  Outcome: Progressing     Problem: HEMATOLOGIC - ADULT  Goal: Maintains hematologic stability  Description: INTERVENTIONS  - Assess for signs and symptoms of bleeding or hemorrhage  - Monitor labs  - Administer supportive blood products/factors as ordered and appropriate  Outcome:  Progressing     Problem: MUSCULOSKELETAL - ADULT  Goal: Maintain or return mobility to safest level of function  Description: INTERVENTIONS:  - Assess patient's ability to carry out ADLs; assess patient's baseline for ADL function and identify physical deficits which impact ability to perform ADLs (bathing, care of mouth/teeth, toileting, grooming, dressing, etc.)  - Assess/evaluate cause of self-care deficits   - Assess range of motion  - Assess patient's mobility  - Assess patient's need for assistive devices and provide as appropriate  - Encourage maximum independence but intervene and supervise when necessary  - Involve family in performance of ADLs  - Assess for home care needs following discharge   - Consider OT consult to assist with ADL evaluation and planning for discharge  - Provide patient education as appropriate  Outcome: Progressing  Goal: Maintain proper alignment of affected body part  Description: INTERVENTIONS:  - Support, maintain and protect limb and body alignment  - Provide patient/ family with appropriate education  Outcome: Progressing     Problem: Nutrition/Hydration-ADULT  Goal: Nutrient/Hydration intake appropriate for improving, restoring or maintaining nutritional needs  Description: Monitor and assess patient's nutrition/hydration status for malnutrition. Collaborate with interdisciplinary team and initiate plan and interventions as ordered.  Monitor patient's weight and dietary intake as ordered or per policy. Utilize nutrition screening tool and intervene as necessary. Determine patient's food preferences and provide high-protein, high-caloric foods as appropriate.     INTERVENTIONS:  - Monitor oral intake, urinary output, labs, and treatment plans  - Assess nutrition and hydration status and recommend course of action  - Evaluate amount of meals eaten  - Assist patient with eating if necessary   - Allow adequate time for meals  - Recommend/ encourage appropriate diets, oral  nutritional supplements, and vitamin/mineral supplements  - Order, calculate, and assess calorie counts as needed  - Recommend, monitor, and adjust tube feedings and TPN/PPN based on assessed needs  - Assess need for intravenous fluids  - Provide specific nutrition/hydration education as appropriate  - Include patient/family/caregiver in decisions related to nutrition  Outcome: Progressing

## 2024-01-19 NOTE — ASSESSMENT & PLAN NOTE
New-onset in AM of 1/11. With occasional RVR with associated hypotension. Amiodarone bolus x1 given at RRT while in dialysis 1/11 AM.    Plan:  Currently NSR  Amiodarone 200 TID  Restart heparin drip

## 2024-01-19 NOTE — ASSESSMENT & PLAN NOTE
Acute blood loss anemia  with hemorrhagic shock present on 1/13 with large melanotic stools.  Patient does have history of GI bleed and previous hospitalization, required intervention in 2019 at [facility]  Received 3 units pRBC  1/14 EGD/flex sig: Significant amount of blood in the descending colon, sigmoid colon, and rectum.  No ischemic changes identified.  Single cratered ulcer in the first part of the duodenum with visible vessel, clips applied.    Plan:  Continue Protonix 40 BID  Continue to monitor frequent hemoglobin  Continue monitor hemodynamics, consider transfusion/hemoglobin recheck with any decline  GI following, appreciate their recommendations

## 2024-01-19 NOTE — PROGRESS NOTES
NEPHROLOGY PROGRESS NOTE   Berto Faria 53 y.o. male MRN: [id number]  Unit/Bed#: [facility] 515-01 Encounter: [id number]      ASSESSMENT & PLAN:  #1 ESRD on HD TTS at [facility].  Previously on CRRT due to hypotension and rapid A-fib, discontinued 01/17.  Tolerated even UF IHD yesterday.  Plan for hemodialysis tomorrow using AV fistula, consider midodrine for augmentation of blood pressure, if no issues with dialysis tomorrow then temporary dialysis line can be removed    #2 new onset rapid A-fib, cardiology on board, currently rate controlled    #3  Recent shock, suspected septic versus cardiogenic as well as GI bleeding, acute HFrEF, currently off inotropic Elaprase support.  Consider midodrine for augmentation of blood pressure    #4 anemia in the setting of ESRD as well as GI bleeding with hematochezia status post EGD and flex sigmoidoscopy by GI status post clipping of duodenal ulcer.  Status post transfusion.  Monitor H&H, status post blood transfusion 01/17, hemoglobin down to 9.1 today    #5 history of failed kidney transplant, continue immunosuppression, it should be titrating down to off as per his outpatient nephrologist    #6 type II MI status post cardiac cath with in-stent restenosis of left circumflex and LAD stents, cardiology on board  Status post high risk Impella supported PCI on 1/18    #7 Access, AV fistula in place, also with temporary HD line    Recommendations were discussed with critical care team, plan for HD tomorrow, gentle UF as tolerated, consider midodrine for augmentation of blood pressure prior to HD tomorrow      SUBJECTIVE:  Seen and examined this morning.  Status post successful Impella assisted PCI on 1/18, currently off Levophed.  Tolerated HD treatment yesterday even  UF    OBJECTIVE:  Current Weight: Weight - Scale: (!) 0.048 kg (1.7 oz)  Vitals:    01/19/24 0800   BP: 90/52   Pulse: 78   Resp: 16   Temp: (!) 97.4 °F (36.3 °C)   SpO2: 94%       Intake/Output Summary (Last 24  hours) at 1/19/2024 0942  Last data filed at 1/19/2024 0800  Gross per 24 hour   Intake 994.26 ml   Output 500 ml   Net 494.26 ml       General: conscious, cooperative, in not acute distress  Eyes: conjunctivae pale, anicteric sclerae  ENT: lips and mucous membranes moist  Neck: supple, no JVD  Chest: Few crackles at bases  CVS: distinct S1 & S2, normal rate, regular rhythm  Abdomen: soft, non-tender, non-distended, normoactive bowel sounds  Extremities: Minimal edema of both legs, right AKA  Skin: no rash  Neuro: awake, alert, oriented  Temporary HD line in place          Medications:    Current Facility-Administered Medications:     acetaminophen (TYLENOL) tablet 650 mg, 650 mg, Oral, Q6H PRN, Katie Grecsek, CRNP, 650 mg at 01/18/24 1214    albuterol (PROVENTIL HFA,VENTOLIN HFA) inhaler 2 puff, 2 puff, Inhalation, Q4H PRN, Katie Grecsek, CRNP    amiodarone tablet 200 mg, 200 mg, Oral, TID With Meals, Katie Grecsek, CRNP, 200 mg at 01/19/24 0737    apixaban (ELIQUIS) tablet 2.5 mg, 2.5 mg, Oral, BID, Katie Grecsek, CRNP, 2.5 mg at 01/19/24 0814    aspirin chewable tablet 81 mg, 81 mg, Oral, QAM, Katie Grecsek, CRNP, 81 mg at 01/19/24 0814    atorvastatin (LIPITOR) tablet 80 mg, 80 mg, Oral, Daily With Dinner, Katie Grecsek, CRNP, 80 mg at 01/18/24 1829    calcitriol (ROCALTROL) capsule 0.5 mcg, 0.5 mcg, Oral, Once per day on Tuesday Thursday Saturday, Katie Grecsek, CRNP, 0.5 mcg at 01/16/24 0800    cinacalcet (SENSIPAR) tablet 30 mg, 30 mg, Oral, Daily, Katie Grecsek, CRNP, 30 mg at 01/19/24 0814    clopidogrel (PLAVIX) tablet 75 mg, 75 mg, Oral, Daily, Katie Grecsek, CRNP, 75 mg at 01/19/24 0814    glycerin-hypromellose- (ARTIFICIAL TEARS) ophthalmic solution 1 drop, 1 drop, Right Eye, Q4H PRNKatie CRNP    insulin glargine (LANTUS) subcutaneous injection 6 Units 0.06 mL, 6 Units, Subcutaneous, Q12H JAMARCUSKatie CRNP, 6 Units at 01/17/24 2011    insulin lispro  (HumaLOG) 100 units/mL subcutaneous injection 1-6 Units, 1-6 Units, Subcutaneous, HS, Katie Grecsek, CRNP, 1 Units at 01/17/24 2142    insulin lispro (HumaLOG) 100 units/mL subcutaneous injection 1-6 Units, 1-6 Units, Subcutaneous, TID With Meals, 2 Units at 01/17/24 1625 **AND** Fingerstick Glucose (POCT), , , TID AC, Katie Grecsek, CRNP    melatonin tablet 3 mg, 3 mg, Oral, HS, Katie Grecsek, CRNP, 3 mg at 01/17/24 2330    norepinephrine (LEVOPHED) 4 mg (STANDARD CONCENTRATION) IV in sodium chloride 0.9% 250 mL, 1-30 mcg/min, Intravenous, Titrated, Katie Grecsek, CRNP, Held at 01/18/24 1423    ondansetron (ZOFRAN) injection 4 mg, 4 mg, Intravenous, Q8H PRN, Katie Grecsek, CRNP, 4 mg at 01/16/24 1330    pantoprazole (PROTONIX) injection 40 mg, 40 mg, Intravenous, Q12H JAMARCUS, Katie Grecsek, CRNP, 40 mg at 01/19/24 0606    predniSONE tablet 5 mg, 5 mg, Oral, Daily, Katie Grecsek, CRNP, 5 mg at 01/19/24 0814    senna-docusate sodium (SENOKOT S) 8.6-50 mg per tablet 1 tablet, 1 tablet, Oral, BID, Katie Grecsek, CRNP, 1 tablet at 01/19/24 0814    tacrolimus (PROGRAF) capsule 1 mg, 1 mg, Oral, QPM, Katie Grecsek, CRNP, 1 mg at 01/18/24 1830    tacrolimus (PROGRAF) capsule 2 mg, 2 mg, Oral, QAM, Katie Grecsek, CRNP, 2 mg at 01/17/24 0803    trimethobenzamide (TIGAN) IM injection 200 mg, 200 mg, Intramuscular, Q6H PRN, Misael Savage MD    Invasive Devices:        Lab Results:   Results from last 7 days   Lab Units 01/19/24  0410 01/18/24  0631 01/17/24  0603 01/17/24  0441 01/13/24  0901 01/13/24  0243   WBC Thousand/uL 11.51* 15.12*  --  11.10*   < >  --    HEMOGLOBIN g/dL 9.1* 10.5*  --  7.7*   < >  --    HEMATOCRIT % 28.5* 32.3*  --  25.0*   < >  --    PLATELETS Thousands/uL 157 185  --  108*   < >  --    SODIUM mmol/L 139 134* 135  --    < >  --    POTASSIUM mmol/L 4.3 5.0 5.0  --    < >  --    CHLORIDE mmol/L 101 101 103  --    < >  --    CO2 mmol/L 28 23 24  --    < >  --    BUN mg/dL 29* 40* 23  " --    < >  --    CREATININE mg/dL 3.90* 4.55* 2.95*  --    < >  --    CALCIUM mg/dL 7.4* 7.5* 8.4  --    < >  --    MAGNESIUM mg/dL 2.1 2.4 2.2  --    < >  --    PHOSPHORUS mg/dL 3.5 4.0 4.0  --    < >  --    ALK PHOS U/L  --   --   --   --   --  56   ALT U/L  --   --   --   --   --  13   AST U/L  --   --   --   --   --  19    < > = values in this interval not displayed.       Previous work up:  See previous notes      Portions of the record may have been created with voice recognition software. Occasional wrong word or \"sound a like\" substitutions may have occurred due to the inherent limitations of voice recognition software. Read the chart carefully and recognize, using context, where substitutions have occurred.If you have any questions, please contact the dictating provider.  "

## 2024-01-20 ENCOUNTER — APPOINTMENT (INPATIENT)
Dept: DIALYSIS | Facility: HOSPITAL | Age: 54
DRG: 853 | End: 2024-01-20
Payer: MEDICARE

## 2024-01-20 VITALS
BODY MASS INDEX: 0.02 KG/M2 | HEART RATE: 71 BPM | WEIGHT: 0.11 LBS | RESPIRATION RATE: 18 BRPM | SYSTOLIC BLOOD PRESSURE: 101 MMHG | TEMPERATURE: 97.6 F | OXYGEN SATURATION: 98 % | DIASTOLIC BLOOD PRESSURE: 55 MMHG | HEIGHT: 64 IN

## 2024-01-20 PROBLEM — E87.20 ACIDOSIS: Status: RESOLVED | Noted: 2018-12-20 | Resolved: 2024-01-20

## 2024-01-20 LAB
ALBUMIN SERPL BCP-MCNC: 2.8 G/DL (ref 3.5–5)
ALP SERPL-CCNC: 115 U/L (ref 34–104)
ALT SERPL W P-5'-P-CCNC: 154 U/L (ref 7–52)
ANION GAP SERPL CALCULATED.3IONS-SCNC: 12 MMOL/L
AST SERPL W P-5'-P-CCNC: 115 U/L (ref 13–39)
BASOPHILS # BLD AUTO: 0.04 THOUSANDS/ÂΜL (ref 0–0.1)
BASOPHILS NFR BLD AUTO: 0 % (ref 0–1)
BILIRUB DIRECT SERPL-MCNC: 0.22 MG/DL (ref 0–0.2)
BILIRUB SERPL-MCNC: 0.71 MG/DL (ref 0.2–1)
BUN SERPL-MCNC: 53 MG/DL (ref 5–25)
CA-I BLD-SCNC: 1.01 MMOL/L (ref 1.12–1.32)
CALCIUM SERPL-MCNC: 7.7 MG/DL (ref 8.4–10.2)
CHLORIDE SERPL-SCNC: 96 MMOL/L (ref 96–108)
CO2 SERPL-SCNC: 22 MMOL/L (ref 21–32)
CREAT SERPL-MCNC: 5.85 MG/DL (ref 0.6–1.3)
EOSINOPHIL # BLD AUTO: 0.1 THOUSAND/ÂΜL (ref 0–0.61)
EOSINOPHIL NFR BLD AUTO: 1 % (ref 0–6)
ERYTHROCYTE [DISTWIDTH] IN BLOOD BY AUTOMATED COUNT: 22.9 % (ref 11.6–15.1)
GFR SERPL CREATININE-BSD FRML MDRD: 10 ML/MIN/1.73SQ M
GLUCOSE SERPL-MCNC: 204 MG/DL (ref 65–140)
GLUCOSE SERPL-MCNC: 204 MG/DL (ref 65–140)
GLUCOSE SERPL-MCNC: 216 MG/DL (ref 65–140)
GLUCOSE SERPL-MCNC: 91 MG/DL (ref 65–140)
HCT VFR BLD AUTO: 28.5 % (ref 36.5–49.3)
HGB BLD-MCNC: 9.2 G/DL (ref 12–17)
IMM GRANULOCYTES # BLD AUTO: 0.08 THOUSAND/UL (ref 0–0.2)
IMM GRANULOCYTES NFR BLD AUTO: 1 % (ref 0–2)
LYMPHOCYTES # BLD AUTO: 2.64 THOUSANDS/ÂΜL (ref 0.6–4.47)
LYMPHOCYTES NFR BLD AUTO: 21 % (ref 14–44)
MAGNESIUM SERPL-MCNC: 2.3 MG/DL (ref 1.9–2.7)
MCH RBC QN AUTO: 32.9 PG (ref 26.8–34.3)
MCHC RBC AUTO-ENTMCNC: 32.3 G/DL (ref 31.4–37.4)
MCV RBC AUTO: 102 FL (ref 82–98)
MONOCYTES # BLD AUTO: 1.25 THOUSAND/ÂΜL (ref 0.17–1.22)
MONOCYTES NFR BLD AUTO: 10 % (ref 4–12)
NEUTROPHILS # BLD AUTO: 8.42 THOUSANDS/ÂΜL (ref 1.85–7.62)
NEUTS SEG NFR BLD AUTO: 67 % (ref 43–75)
NRBC BLD AUTO-RTO: 0 /100 WBCS
PHOSPHATE SERPL-MCNC: 4.6 MG/DL (ref 2.7–4.5)
PLATELET # BLD AUTO: 208 THOUSANDS/UL (ref 149–390)
PMV BLD AUTO: 10.3 FL (ref 8.9–12.7)
POTASSIUM SERPL-SCNC: 5.3 MMOL/L (ref 3.5–5.3)
PROT SERPL-MCNC: 5.2 G/DL (ref 6.4–8.4)
RBC # BLD AUTO: 2.8 MILLION/UL (ref 3.88–5.62)
SODIUM SERPL-SCNC: 130 MMOL/L (ref 135–147)
WBC # BLD AUTO: 12.53 THOUSAND/UL (ref 4.31–10.16)

## 2024-01-20 PROCEDURE — 82948 REAGENT STRIP/BLOOD GLUCOSE: CPT

## 2024-01-20 PROCEDURE — 99232 SBSQ HOSP IP/OBS MODERATE 35: CPT | Performed by: INTERNAL MEDICINE

## 2024-01-20 PROCEDURE — 80076 HEPATIC FUNCTION PANEL: CPT

## 2024-01-20 PROCEDURE — 84100 ASSAY OF PHOSPHORUS: CPT

## 2024-01-20 PROCEDURE — C9113 INJ PANTOPRAZOLE SODIUM, VIA: HCPCS

## 2024-01-20 PROCEDURE — 82330 ASSAY OF CALCIUM: CPT

## 2024-01-20 PROCEDURE — 5A1D90Z PERFORMANCE OF URINARY FILTRATION, CONTINUOUS, GREATER THAN 18 HOURS PER DAY: ICD-10-PCS | Performed by: INTERNAL MEDICINE

## 2024-01-20 PROCEDURE — 85025 COMPLETE CBC W/AUTO DIFF WBC: CPT

## 2024-01-20 PROCEDURE — 99239 HOSP IP/OBS DSCHRG MGMT >30: CPT | Performed by: INTERNAL MEDICINE

## 2024-01-20 PROCEDURE — 83735 ASSAY OF MAGNESIUM: CPT

## 2024-01-20 PROCEDURE — 80048 BASIC METABOLIC PNL TOTAL CA: CPT

## 2024-01-20 RX ORDER — MIDODRINE HYDROCHLORIDE 5 MG/1
5 TABLET ORAL 3 TIMES WEEKLY
Qty: 12 TABLET | Refills: 0 | Status: SHIPPED | OUTPATIENT
Start: 2024-01-22

## 2024-01-20 RX ORDER — PANTOPRAZOLE SODIUM 40 MG/1
40 TABLET, DELAYED RELEASE ORAL 2 TIMES DAILY
Qty: 60 TABLET | Refills: 0 | Status: SHIPPED | OUTPATIENT
Start: 2024-01-20

## 2024-01-20 RX ORDER — CLOPIDOGREL BISULFATE 75 MG/1
75 TABLET ORAL DAILY
Qty: 30 TABLET | Refills: 0 | Status: SHIPPED | OUTPATIENT
Start: 2024-01-21

## 2024-01-20 RX ORDER — MIDODRINE HYDROCHLORIDE 10 MG/1
10 TABLET ORAL
Qty: 90 TABLET | Refills: 0 | Status: SHIPPED | OUTPATIENT
Start: 2024-01-20

## 2024-01-20 RX ORDER — AMIODARONE HYDROCHLORIDE 200 MG/1
200 TABLET ORAL
Qty: 30 TABLET | Refills: 0 | Status: SHIPPED | OUTPATIENT
Start: 2024-01-21

## 2024-01-20 RX ORDER — CALCITRIOL 0.5 UG/1
0.5 CAPSULE, LIQUID FILLED ORAL 3 TIMES WEEKLY
Qty: 12 CAPSULE | Refills: 0 | Status: SHIPPED | OUTPATIENT
Start: 2024-01-23

## 2024-01-20 RX ORDER — AMIODARONE HYDROCHLORIDE 200 MG/1
200 TABLET ORAL
Status: DISCONTINUED | OUTPATIENT
Start: 2024-01-21 | End: 2024-01-20 | Stop reason: HOSPADM

## 2024-01-20 RX ADMIN — PREDNISONE 5 MG: 5 TABLET ORAL at 08:35

## 2024-01-20 RX ADMIN — ASPIRIN 81 MG CHEWABLE TABLET 81 MG: 81 TABLET CHEWABLE at 08:34

## 2024-01-20 RX ADMIN — CLOPIDOGREL BISULFATE 75 MG: 75 TABLET ORAL at 08:35

## 2024-01-20 RX ADMIN — INSULIN LISPRO 1 UNITS: 100 INJECTION, SOLUTION INTRAVENOUS; SUBCUTANEOUS at 13:22

## 2024-01-20 RX ADMIN — APIXABAN 2.5 MG: 2.5 TABLET, FILM COATED ORAL at 18:30

## 2024-01-20 RX ADMIN — PANTOPRAZOLE SODIUM 40 MG: 40 INJECTION, POWDER, FOR SOLUTION INTRAVENOUS at 05:17

## 2024-01-20 RX ADMIN — MIDODRINE HYDROCHLORIDE 10 MG: 5 TABLET ORAL at 07:10

## 2024-01-20 RX ADMIN — CINACALCET 30 MG: 30 TABLET, FILM COATED ORAL at 08:35

## 2024-01-20 RX ADMIN — PANTOPRAZOLE SODIUM 40 MG: 40 INJECTION, POWDER, FOR SOLUTION INTRAVENOUS at 18:30

## 2024-01-20 RX ADMIN — SENNOSIDES, DOCUSATE SODIUM 1 TABLET: 8.6; 5 TABLET ORAL at 18:30

## 2024-01-20 RX ADMIN — INSULIN GLARGINE 3 UNITS: 100 INJECTION, SOLUTION SUBCUTANEOUS at 08:42

## 2024-01-20 RX ADMIN — TACROLIMUS 1 MG: 1 CAPSULE ORAL at 18:30

## 2024-01-20 RX ADMIN — MIDODRINE HYDROCHLORIDE 10 MG: 5 TABLET ORAL at 16:04

## 2024-01-20 RX ADMIN — MIDODRINE HYDROCHLORIDE 10 MG: 5 TABLET ORAL at 13:22

## 2024-01-20 RX ADMIN — TACROLIMUS 2 MG: 1 CAPSULE ORAL at 08:37

## 2024-01-20 RX ADMIN — MIDODRINE HYDROCHLORIDE 5 MG: 5 TABLET ORAL at 14:12

## 2024-01-20 RX ADMIN — APIXABAN 2.5 MG: 2.5 TABLET, FILM COATED ORAL at 08:34

## 2024-01-20 RX ADMIN — ATORVASTATIN CALCIUM 80 MG: 80 TABLET, FILM COATED ORAL at 18:35

## 2024-01-20 RX ADMIN — INSULIN LISPRO 2 UNITS: 100 INJECTION, SOLUTION INTRAVENOUS; SUBCUTANEOUS at 07:18

## 2024-01-20 RX ADMIN — CALCITRIOL 0.5 MCG: 0.25 CAPSULE, LIQUID FILLED ORAL at 08:34

## 2024-01-20 RX ADMIN — AMIODARONE HYDROCHLORIDE 200 MG: 200 TABLET ORAL at 08:33

## 2024-01-20 NOTE — ASSESSMENT & PLAN NOTE
History of renal transplant chronically on tacrolimus 1 mg p.o. nightly, 2 mg p.o. every morning, AZA 50 daily, Prednisone 5 Daily    Continue tacrolimus 2mg qAM, 1mg qPM  Prednisone 5 mg qD  Holding home azathioprine 50mg daily   Outpatient follow up with nephrology to taper off rejection medications

## 2024-01-20 NOTE — PROGRESS NOTES
Cardiology Progress Note - Berto Faria 53 y.o. male MRN: [id number]    Unit/Bed#: [facility] 409-01 Encounter: [id number]        Hospital Problems:  Principal Problem:    NSTEMI (non-ST elevated myocardial infarction) ([facility])  Active Problems:    Type 1 diabetes mellitus on insulin therapy ([facility])    Acidosis    GI bleed w/ Hemorrhagic Shock    S/P AKA (above knee amputation), right ([facility])    Acute on chronic anemia    ESRD (end stage renal disease) ([facility])    Coronary artery disease involving native coronary artery    Multifocal pneumonia    Acute on chronic diastolic (congestive) heart failure ([facility])    Renal transplant failure and rejection    New onset a-fib ([facility])      Assessment/Recommendations:    Multivessel CAD including critical in-stent restenosis of the left main to ostial LAD stent and significant in-stent restenosis of the ostial circumflex stent,  of the RCA, not surgical candidate.  Status post complex PCI of the ostial proximal LAD and ostial circumflex with drug-eluting stents using Impella support with excellent angiographic results.  No angina reported.  Medical therapy reviewed.  Continue aspirin, clopidogrel and low-dose Eliquis.  Hopefully discontinue aspirin at early cardiology follow-up next week.  Cardiology follow-up arranged for 1/25/2024.  Continue high intensity statins.  Discussed medical therapy with the patient.     Paroxysmal A-fib, now maintaining sinus rhythm.  Eliquis dose was adjusted for renal failure and body weight.  Switched to amiodarone 200 mg daily.     Ischemic cardiomyopathy/acute heart failure with reduced EF of 30%, volume management with CRRT     GI bleeding status post clipping of duodenal ulcer: No recurrent bleeding on anticoagulation and antiplatelet therapy.  Discontinue aspirin within 1 to 3 weeks.     Continue current medical therapy.  Outpatient cardiology follow-up arranged for 1/25/2024.  Discussed this with the patient.    Thank you for allowing us to participate  in the care of this patient.  If there are any further questions regarding this patient please feel free to contact us.      Subjective:     Overnight events reviewed.  Feeling better.  Denies groin discomfort.  Has mild left leg swelling.  Dialysis planned for today.  Eager to go home.    Review of Systems   Constitutional:  Negative for appetite change.   Respiratory:  Negative for chest tightness and shortness of breath.    Cardiovascular:  Positive for leg swelling. Negative for chest pain and palpitations.   Hematological:  Bruises/bleeds easily.          Current Facility-Administered Medications:     acetaminophen (TYLENOL) tablet 650 mg, 650 mg, Oral, Q6H PRN, Giacomo DAS McGonigal, PA-C, 650 mg at 01/18/24 1214    albuterol (PROVENTIL HFA,VENTOLIN HFA) inhaler 2 puff, 2 puff, Inhalation, Q4H PRN, Giacomo Portilloonigal, PA-C    amiodarone tablet 200 mg, 200 mg, Oral, TID With Meals, Giacomo Kinseygal, PA-C, 200 mg at 01/20/24 0833    apixaban (ELIQUIS) tablet 2.5 mg, 2.5 mg, Oral, BID, Giacomo Portilloonigal, PA-C, 2.5 mg at 01/20/24 0834    aspirin chewable tablet 81 mg, 81 mg, Oral, QAM, Giacomo DAS McGonigal, PA-C, 81 mg at 01/20/24 0834    atorvastatin (LIPITOR) tablet 80 mg, 80 mg, Oral, Daily With Dinner, Giacomo DAS McGonigal, PA-C, 80 mg at 01/19/24 1535    calcitriol (ROCALTROL) capsule 0.5 mcg, 0.5 mcg, Oral, Once per day on Tuesday Thursday Saturday, Giacomo Portilloonigal, PA-C, 0.5 mcg at 01/20/24 0834    cinacalcet (SENSIPAR) tablet 30 mg, 30 mg, Oral, Daily, Giacomo DAS McGonigal, PA-C, 30 mg at 01/20/24 0835    clopidogrel (PLAVIX) tablet 75 mg, 75 mg, Oral, Daily, Giacomo E McGonigal, PA-C, 75 mg at 01/20/24 0835    glycerin-hypromellose- (ARTIFICIAL TEARS) ophthalmic solution 1 drop, 1 drop, Right Eye, Q4H PRN, Giacomo Molina PA-C    insulin glargine (LANTUS) subcutaneous injection 6 Units 0.06 mL, 6 Units, Subcutaneous, Q12H JAMARCUS, Giacomo Molina PA-C, 3 Units at 01/20/24 0842    insulin lispro  "(HumaLOG) 100 units/mL subcutaneous injection 1-6 Units, 1-6 Units, Subcutaneous, HS, Giacomo Molina PA-C, 1 Units at 24 2142    insulin lispro (HumaLOG) 100 units/mL subcutaneous injection 1-6 Units, 1-6 Units, Subcutaneous, TID With Meals, 2 Units at 24 0718 **AND** Fingerstick Glucose (POCT), , , TID AC, LIZ Little-C    melatonin tablet 3 mg, 3 mg, Oral, HS, Giacomo Molina PA-C, 3 mg at 24 2330    midodrine (PROAMATINE) tablet 10 mg, 10 mg, Oral, TID AC, LIZ Little-C, 10 mg at 24 0710    midodrine (PROAMATINE) tablet 5 mg, 5 mg, Oral, Before Dialysis, Giacomo Molina PA-C    ondansetron (ZOFRAN) injection 4 mg, 4 mg, Intravenous, Q8H PRN, Giacomo Molina PA-C, 4 mg at 24 1330    pantoprazole (PROTONIX) injection 40 mg, 40 mg, Intravenous, Q12H JAMARCUS, Giacomo Molina PA-C, 40 mg at 24 0517    predniSONE tablet 5 mg, 5 mg, Oral, Daily, Giacomo Molina PA-C, 5 mg at 24 0835    senna-docusate sodium (SENOKOT S) 8.6-50 mg per tablet 1 tablet, 1 tablet, Oral, BID, Giacomo Molina PA-C, 1 tablet at 24 0814    tacrolimus (PROGRAF) capsule 1 mg, 1 mg, Oral, QPM, Giacomo Molina PA-C, 1 mg at 24 1722    tacrolimus (PROGRAF) capsule 2 mg, 2 mg, Oral, QAM, Giacomo Molina PA-C, 2 mg at 24 0837    trimethobenzamide (TIGAN) IM injection 200 mg, 200 mg, Intramuscular, Q6H PRN, LIZ Little-C      Objective:     Vitals:   Blood pressure 90/54, pulse 69, temperature 97.8 °F (36.6 °C), temperature source Oral, resp. rate 18, height 5' 4\" (1.626 m), weight (!) 0.048 kg (1.7 oz), SpO2 98%.  Body mass index is 0.02 kg/m².  Systolic (24hrs), Av , Min:80 , Max:94     Diastolic (24hrs), Av, Min:46, Max:56      Intake/Output Summary (Last 24 hours) at 2024 1006  Last data filed at 2024 0101  Gross per 24 hour   Intake 100 ml   Output 0 ml   Net 100 ml     Weight (last 2 days)       Date/Time " "Weight    24 0600 0.048 (0.11)              Physical Exam  Vitals reviewed.   HENT:      Head: Normocephalic.   Cardiovascular:      Rate and Rhythm: Normal rate and regular rhythm.      Heart sounds: S1 normal and S2 normal.   Pulmonary:      Breath sounds: No wheezing or rhonchi.   Abdominal:      Tenderness: There is no abdominal tenderness.   Musculoskeletal:      Left lower le+ Pitting Edema present.      Comments: Right above-knee amputation   Skin:     General: Skin is warm.   Neurological:      General: No focal deficit present.      Mental Status: He is alert.   Psychiatric:         Mood and Affect: Mood normal.           Labs & Results:    Lab Results   Component Value Date    SODIUM 130 (L) 2024    K 5.3 2024    CL 96 2024    CO2 22 2024    BUN 53 (H) 2024    CREATININE 5.85 (H) 2024    GLUC 204 (H) 2024    CALCIUM 7.7 (L) 2024     Lab Results   Component Value Date    WBC 12.53 (H) 2024    WBC 6.52 2015    RBC 2.80 (L) 2024    RBC 4.00 2015    HGB 9.2 (L) 2024    HGB 11.7 (L) 2015    HCT 28.5 (L) 2024    HCT 35.4 (L) 2015     (H) 2024    MCV 89 2015    MCH 32.9 2024    MCH 29.3 2015    RDW 22.9 (H) 2024    RDW 14.1 2015     2024     2015     Results from last 7 days   Lab Units 24  0631 24  2141 24  1325   PTT seconds 87* >210* >210*       Available cardiology studies, imaging and lab results independently reviewed today.      This note was completed in part utilizing voice recognition software.   Grammatical errors, random word insertion, spelling mistakes, occasional wrong word or \"sound-alike\" substitutions and incomplete sentences may be an occasional consequence of the system secondary to software limitations, ambient noise and hardware issues. At the time of dictation, efforts were made to edit, clarify and " /or correct errors.  Please read the chart carefully and recognize, using context, where substitutions have occurred.  If you have any questions or concerns about the context, text or information contained within the body of this dictation, please contact myself, the provider, for further clarification.

## 2024-01-20 NOTE — INCIDENTAL FINDINGS
The following findings require follow up:  Radiographic finding   Finding: hypoechoic non-vascularized structure in the  left groin anterior to the iliac vessels measuring approximately 3.3 x 2.2 x 3.1  cm.   Follow up required: CT scan of the abdomen and pelvis   Follow up should be done within 1 month(s)    Please notify the following clinician to assist with the follow up:   Dr. Anderson

## 2024-01-20 NOTE — ASSESSMENT & PLAN NOTE
Wt Readings from Last 3 Encounters:   01/19/24 (!) 0.048 kg (1.7 oz)   01/08/24 50 kg (110 lb 3.7 oz)   08/21/23 51.4 kg (113 lb 6.4 oz)       Volume management per HD  No GDMT due to hypotension on midodrine

## 2024-01-20 NOTE — ASSESSMENT & PLAN NOTE
EGD: C0M1 Scales's esophagus.   Moderate gastritis; performed cold forceps biopsy to rule out H. pylori  Joshua IIA ulcer (visible vessel). Through the scope clips were unsuccessful. OVESCO (over the scope clip) applied successfully and with hemostasis.   Single ulcer in the 1st part of the duodenum with flat pigmented spot (Joshua IIC)  Multiple small, ulcers in the duodenal bulb, 1st part of the duodenum and 2nd part of the duodenum with clean base (Joshua III)        RECOMMENDATION:    Await pathology results     PPI BID  Patient will eventually need repeat EGD after recovery from acute issues to confirm healing of duodenal ulcers   Outpatient follow up with GI for a full colonoscopy

## 2024-01-20 NOTE — DISCHARGE SUMMARY
[facility]  Discharge- Berto Faria 1970, 53 y.o. male MRN: [id number]  Unit/Bed#: [facility] 409-01 Encounter: [id number]  Primary Care Provider: Dalton Anderson DO   Date and time admitted to hospital: 1/8/2024  1:26 PM    * NSTEMI (non-ST elevated myocardial infarction) ([facility])  Assessment & Plan  Presented to [facility] ED with shortness of breath. Troponin and EKG checked as part of initial workup. EKG without acute ischemic changes   Multivessel CAD including critical in-stent restenosis of the left main to ostial LAD stent and significant in-stent restenosis of the ostial circumflex stent,  of the RCA, not surgical candidate.    Status post complex PCI of the ostial proximal LAD and ostial circumflex with drug-eluting stents using Impella support with excellent angiographic results.  Continue aspirin, clopidogrel and low-dose Eliquis.  Hopefully discontinue aspirin at early cardiology follow-up next week.  Cardiology follow-up arranged for 1/25/2024.    Continue high intensity statins.       New onset a-fib (HCC)  Assessment & Plan  Reate controlled  Continue amiodarone for rate control and eliquis for AC    Multifocal pneumonia  Assessment & Plan  Presented 1/7/23 to [facility] with new oxygen requirement, fatigue   1/7/24 CT Chest: Bilateral multilevel airspace consolidation, groundglass opacities, septal thickening and small bilateral pleural effusions secondary to volume overloaded as well as pneumonia.  Initial procalcitonin: 3.16 (1/8/24)  Cultures:  1/7/24 BCXx2: No growth   1/7/23 COVID/Flu/RSV: Negative   1/7/23 RP2: Negative   1/8/24 MRSA Cx: Negative  1/8/24 BCXx2: No growth    Resolved, off antibiotics now    GI bleed w/ Hemorrhagic Shock  Assessment & Plan  EGD: C0M1 Scales's esophagus.   Moderate gastritis; performed cold forceps biopsy to rule out H. pylori  Joshua IIA ulcer (visible vessel). Through the scope clips were unsuccessful. OVESCO (over the scope clip)  applied successfully and with hemostasis.   Single ulcer in the 1st part of the duodenum with flat pigmented spot (Joshua IIC)  Multiple small, ulcers in the duodenal bulb, 1st part of the duodenum and 2nd part of the duodenum with clean base (Joshua III)        RECOMMENDATION:    Await pathology results     PPI BID  Patient will eventually need repeat EGD after recovery from acute issues to confirm healing of duodenal ulcers   Outpatient follow up with GI for a full colonoscopy    Type 1 diabetes mellitus on insulin therapy ([facility])  Assessment & Plan  Lab Results   Component Value Date    HGBA1C 7.7 (H) 01/07/2024       Recent Labs     01/19/24  1447 01/19/24  2114 01/20/24  0713 01/20/24  1100   POCGLU 194* 227* 216* 204*         Blood Sugar Average: Last 72 hrs:  (P) 169.75    Resume home insulin regimen  Diabetic diet    ESRD (end stage renal disease) ([facility])  Assessment & Plan  Lab Results   Component Value Date    EGFR 10 01/20/2024    EGFR 16 01/19/2024    EGFR 13 01/18/2024    CREATININE 5.85 (H) 01/20/2024    CREATININE 3.90 (H) 01/19/2024    CREATININE 4.55 (H) 01/18/2024   Dialysis regimen: Tuesday, Thursday, Saturday via right arm AV fistula   Remove HD catheter prior to discharge      Acute on chronic diastolic (congestive) heart failure ([facility])  Assessment & Plan  Wt Readings from Last 3 Encounters:   01/19/24 (!) 0.048 kg (1.7 oz)   01/08/24 50 kg (110 lb 3.7 oz)   08/21/23 51.4 kg (113 lb 6.4 oz)       Volume management per HD  No GDMT due to hypotension on midodrine      Renal transplant failure and rejection  Assessment & Plan  History of renal transplant chronically on tacrolimus 1 mg p.o. nightly, 2 mg p.o. every morning, AZA 50 daily, Prednisone 5 Daily    Continue tacrolimus 2mg qAM, 1mg qPM  Prednisone 5 mg qD  Holding home azathioprine 50mg daily   Outpatient follow up with nephrology to taper off rejection medications    Acute on chronic anemia  Assessment & Plan  Monitor H&H  Stable now    S/P  AKA (above knee amputation), right (HCC)  Assessment & Plan  Secondary to chronic osteomyelitis         Medical Problems       Resolved Problems  Date Reviewed: 1/19/2024            Resolved    Acidosis 1/20/2024     Resolved by  Mulugeta Myrick MD        Discharging Physician / Practitioner: Mulugeta Myrick MD  PCP: Dalton Anderson DO  Admission Date:   Admission Orders (From admission, onward)       Ordered        01/08/24 1910  Inpatient Admission  Once                          Discharge Date: 01/20/24    Consultations During Hospital Stay:  Cardiology  Endocrinology  Gastroenterology  Cardiac surgery  Critical care  Nephrology  pulmonology    Procedures Performed:   Central line insertion  Arterial line insertion  EGD  Flexible sigmoidoscopy  Inpatient HD  Left heat cath  Cardiac PCI, cardiac impella insertion    Significant Findings / Test Results:    Ost LAD lesion is 95% stenosed.    Ost Cx to Prox Cx lesion is 75% stenosed.    Prox RCA lesion is 100% stenosed.    Mid LAD lesion is 60% stenosed.    Dist LM lesion is 50% stenosed.    1st Mrg lesion is 100% stenosed.     Left Ventricle: Left ventricular cavity size is mildly dilated. Wall thickness is normal. There is eccentric hypertrophy. The left ventricular ejection fraction is 32%. Systolic function is severely reduced. There is severe global hypokinesis with specific regional wall abnormalities in the anteroseptal, inferior and apical regions. Diastolic function is abnormal.    Left Atrium: The atrium is moderately dilated.    Mitral Valve: There is mild to moderate regurgitation.    Tricuspid Valve: There is mild to moderate regurgitation. The right ventricular systolic pressure is moderately elevated. The estimated right ventricular systolic pressure is 57.00 mmHg.    Incidental Findings:   Pancreatic cysts  Cystic structure of the groin  I reviewed the above mentioned incidental findings with the patient and/or family and they expressed  understanding.    Test Results Pending at Discharge (will require follow up):   None     Outpatient Tests Requested:  EGD  Colonoscopy  MRI pancreas  CT abdomen and pelvis    Complications:  see hospital course    Reason for Admission: respiratory failure.     Hospital Course:   Berto Faria is a 53 y.o. male patient who originally presented to the hospital on 1/8/2024 due to shortness of breath. We was noted to be hypoxic and admitted for further evaluation. He was treated with lasix, heparin for NSTEMI and started on ceftriaxone for pneumonia. He developed shock and was transferred to the ICU for critical care. He was found to have in stent thrombosis and had a PCI. He developed GI bleeding due to duodenal ulcers and had an EGD and sigmoidoscopy as mentioned above. He was stabilized and transferred to internal medicine. He remained stable and was discharged home. Please see the problem list and chart for full details of this 11 day hospitalization.    Please see above list of diagnoses and related plan for additional information.     Condition at Discharge: stable    Discharge Day Visit / Exam:   * Please refer to separate progress note for these details *    Discussion with Family: Updated  (wife) via phone.    Discharge instructions/Information to patient and family:   See after visit summary for information provided to patient and family.      Provisions for Follow-Up Care:  See after visit summary for information related to follow-up care and any pertinent home health orders.      Mobility at time of Discharge:   Basic Mobility Inpatient Raw Score: 18  JH-HLM Goal: 6: Walk 10 steps or more  JH-HLM Achieved: 6: Walk 10 steps or more  HLM Goal achieved. Continue to encourage appropriate mobility.     Disposition:   Home    Planned Readmission: No     Discharge Statement:  I spent 45 minutes discharging the patient. This time was spent on the day of discharge. I had direct contact with the  patient on the day of discharge. Greater than 50% of the total time was spent examining patient, answering all patient questions, arranging and discussing plan of care with patient as well as directly providing post-discharge instructions.  Additional time then spent on discharge activities.    Discharge Medications:  See after visit summary for reconciled discharge medications provided to patient and/or family.      **Please Note: This note may have been constructed using a voice recognition system**

## 2024-01-20 NOTE — ASSESSMENT & PLAN NOTE
Lab Results   Component Value Date    HGBA1C 7.7 (H) 01/07/2024       Recent Labs     01/19/24  1447 01/19/24  2114 01/20/24  0713 01/20/24  1100   POCGLU 194* 227* 216* 204*         Blood Sugar Average: Last 72 hrs:  (P) 169.75    Resume home insulin regimen  Diabetic diet

## 2024-01-20 NOTE — PLAN OF CARE
Target UF Goal  500 ml  as tolerated. Patient dialyzing for  195 minutes  on 2 K bath for serum K of  5.3  per protocol. Treatment plan reviewed with Dr. Culp   Problem: METABOLIC, FLUID AND ELECTROLYTES - ADULT  Goal: Electrolytes maintained within normal limits  Description: INTERVENTIONS:  - Monitor labs and assess patient for signs and symptoms of electrolyte imbalances  - Administer electrolyte replacement as ordered  - Monitor response to electrolyte replacements, including repeat lab results as appropriate  - Instruct patient on fluid and nutrition as appropriate  Outcome: Progressing  Goal: Fluid balance maintained  Description: INTERVENTIONS:  - Monitor labs   - Monitor I/O and WT  - Instruct patient on fluid and nutrition as appropriate  - Assess for signs & symptoms of volume excess or deficit  Outcome: Progressing   Post-Dialysis RN Treatment Note    Blood Pressure:  Pre 93/49 mm/Hg  Post 118/78 mmHg   EDW  47.5 kg    Weight:  Pre 51.3 kg   Post 50.3 kg   Mode of weight measurement: Bed Scale   Volume Removed  1000 ml    Treatment duration 195 minutes    NS given  No    Treatment shortened? No   Medications given during Rx Midodrine 15 mg total   Estimated Kt/V  1.16   Access type: AV fistula   Access Issues: No    Report called to primary nurse   Yes Lauryn Zambrano RN

## 2024-01-20 NOTE — PROGRESS NOTES
NEPHROLOGY PROGRESS NOTE   Berto Faria 53 y.o. male MRN: [id number]  Unit/Bed#: [facility] 409-01 Encounter: [id number]      ASSESSMENT & PLAN:  #1 ESRD on HD TTS at [facility].  Previously on CRRT due to hypotension and rapid A-fib, discontinued 01/17.  Plan for regular IHD today, continue with midodrine 3 times a day as well as midodrine during dialysis, UF as tolerated to bring into his dry weight.  Plan to use his AV fistula, if fistula works well during dialysis temporary HD line can be removed after    #2 new onset rapid A-fib, cardiology on board, currently rate controlled    #3  Recent shock, suspected septic versus cardiogenic as well as GI bleeding, acute HFrEF, currently off inotropic Elaprase support.  Continue midodrine for augmentation of blood pressure    #4 anemia in the setting of ESRD as well as GI bleeding with hematochezia status post EGD and flex sigmoidoscopy by GI status post clipping of duodenal ulcer.  Status post transfusion.  Monitor H&H, status post blood transfusion 01/17, hemoglobin 2 today  #5 history of failed kidney transplant, continue immunosuppression, immunosuppression should be titrated down to off as per his outpatient nephrologist    #6 type II MI status post cardiac cath with in-stent restenosis of left circumflex and LAD stents, cardiology on board  Status post high risk Impella supported PCI on 1/18    #7 Access, AV fistula in place, also with temporary HD line   okay to remove temporary HD line after dialysis today if AV fistula continues to work well        SUBJECTIVE:  Seen and examined, feeling better, wants to go home.  Denies any chest pain, no shortness of breath, no nausea or vomiting.      OBJECTIVE:  Current Weight: Weight - Scale: (!) 0.048 kg (1.7 oz)  Vitals:    01/20/24 0745   BP: 90/54   Pulse: 69   Resp:    Temp: 97.8 °F (36.6 °C)   SpO2: 98%       Intake/Output Summary (Last 24 hours) at 1/20/2024 0830  Last data filed at 1/20/2024 0101  Gross per 24  hour   Intake 100 ml   Output 0 ml   Net 100 ml       General: conscious, cooperative, in not acute distress  Eyes: conjunctivae pink, anicteric sclerae  ENT: lips and mucous membranes moist  Neck: supple, no JVD  Chest: clear breath sounds bilateral, no crackles, ronchus or wheezings  CVS: distinct S1 & S2, normal rate, regular rhythm  Abdomen: soft, non-tender, non-distended, normoactive bowel sounds  Extremities: no edema of both legs, right AKA  Skin: no rash  Neuro: awake, alert, oriented        Medications:    Current Facility-Administered Medications:     acetaminophen (TYLENOL) tablet 650 mg, 650 mg, Oral, Q6H PRN, Giacomo Kinseygal, PA-C, 650 mg at 01/18/24 1214    albuterol (PROVENTIL HFA,VENTOLIN HFA) inhaler 2 puff, 2 puff, Inhalation, Q4H PRN, Giacomo Molina PA-C    amiodarone tablet 200 mg, 200 mg, Oral, TID With Meals, Giacomo Molina PA-C, 200 mg at 01/19/24 1535    apixaban (ELIQUIS) tablet 2.5 mg, 2.5 mg, Oral, BID, Giacomo Molina, PA-C, 2.5 mg at 01/19/24 1721    aspirin chewable tablet 81 mg, 81 mg, Oral, QAM, Giacomo Kinseygal, PA-C, 81 mg at 01/19/24 0814    atorvastatin (LIPITOR) tablet 80 mg, 80 mg, Oral, Daily With Dinner, Giacomo Molina PA-C, 80 mg at 01/19/24 1535    calcitriol (ROCALTROL) capsule 0.5 mcg, 0.5 mcg, Oral, Once per day on Tuesday Thursday Saturday, Giacomo Molina, PA-C, 0.5 mcg at 01/16/24 0800    cinacalcet (SENSIPAR) tablet 30 mg, 30 mg, Oral, Daily, Giacomo Molina, PA-C, 30 mg at 01/19/24 0814    clopidogrel (PLAVIX) tablet 75 mg, 75 mg, Oral, Daily, Giacomo Molina, PA-C, 75 mg at 01/19/24 0814    glycerin-hypromellose- (ARTIFICIAL TEARS) ophthalmic solution 1 drop, 1 drop, Right Eye, Q4H PRN, Giacomo Molina PA-C    insulin glargine (LANTUS) subcutaneous injection 6 Units 0.06 mL, 6 Units, Subcutaneous, Q12H JAMARCUS, Giacomo Molina PA-C, 3 Units at 01/19/24 2129    insulin lispro (HumaLOG) 100 units/mL subcutaneous injection  1-6 Units, 1-6 Units, Subcutaneous, HS, Giacomo Molina PA-C, 1 Units at 01/17/24 2142    insulin lispro (HumaLOG) 100 units/mL subcutaneous injection 1-6 Units, 1-6 Units, Subcutaneous, TID With Meals, 2 Units at 01/20/24 0718 **AND** Fingerstick Glucose (POCT), , , TID AC, Giacomo Molina PA-C    melatonin tablet 3 mg, 3 mg, Oral, HS, Giacomo Molina PA-C, 3 mg at 01/17/24 2330    midodrine (PROAMATINE) tablet 10 mg, 10 mg, Oral, TID AC, Giacomo Molina PA-C, 10 mg at 01/20/24 0710    midodrine (PROAMATINE) tablet 5 mg, 5 mg, Oral, Before Dialysis, Giacomo Molina PA-C    ondansetron (ZOFRAN) injection 4 mg, 4 mg, Intravenous, Q8H PRN, Giacomo Molina PA-C, 4 mg at 01/16/24 1330    pantoprazole (PROTONIX) injection 40 mg, 40 mg, Intravenous, Q12H JAMRACUS, Giacomo Molina PA-C, 40 mg at 01/20/24 0517    predniSONE tablet 5 mg, 5 mg, Oral, Daily, Giacomo Molina PA-C, 5 mg at 01/19/24 0814    senna-docusate sodium (SENOKOT S) 8.6-50 mg per tablet 1 tablet, 1 tablet, Oral, BID, Giacomo Molina PA-C, 1 tablet at 01/19/24 0814    tacrolimus (PROGRAF) capsule 1 mg, 1 mg, Oral, QPM, Giacomo Molina PA-C, 1 mg at 01/19/24 1722    tacrolimus (PROGRAF) capsule 2 mg, 2 mg, Oral, QAM, Giacomo Molina PA-C, 2 mg at 01/19/24 1021    trimethobenzamide (TIGAN) IM injection 200 mg, 200 mg, Intramuscular, Q6H PRN, Giacomo Molina PA-C    Invasive Devices:        Lab Results:   Results from last 7 days   Lab Units 01/20/24  0516 01/19/24  0410 01/18/24  0631   WBC Thousand/uL 12.53* 11.51* 15.12*   HEMOGLOBIN g/dL 9.2* 9.1* 10.5*   HEMATOCRIT % 28.5* 28.5* 32.3*   PLATELETS Thousands/uL 208 157 185   SODIUM mmol/L 130* 139 134*   POTASSIUM mmol/L 5.3 4.3 5.0   CHLORIDE mmol/L 96 101 101   CO2 mmol/L 22 28 23   BUN mg/dL 53* 29* 40*   CREATININE mg/dL 5.85* 3.90* 4.55*   CALCIUM mg/dL 7.7* 7.4* 7.5*   MAGNESIUM mg/dL 2.3 2.1 2.4   PHOSPHORUS mg/dL 4.6* 3.5 4.0   ALK PHOS U/L 115*  --   --   "  ALT U/L 154*  --   --    AST U/L 115*  --   --        Previous work up:  See previous notes      Portions of the record may have been created with voice recognition software. Occasional wrong word or \"sound a like\" substitutions may have occurred due to the inherent limitations of voice recognition software. Read the chart carefully and recognize, using context, where substitutions have occurred.If you have any questions, please contact the dictating provider.  "

## 2024-01-20 NOTE — ASSESSMENT & PLAN NOTE
Presented to [facility] ED with shortness of breath. Troponin and EKG checked as part of initial workup. EKG without acute ischemic changes   Multivessel CAD including critical in-stent restenosis of the left main to ostial LAD stent and significant in-stent restenosis of the ostial circumflex stent,  of the RCA, not surgical candidate.    Status post complex PCI of the ostial proximal LAD and ostial circumflex with drug-eluting stents using Impella support with excellent angiographic results.  Continue aspirin, clopidogrel and low-dose Eliquis.  Hopefully discontinue aspirin at early cardiology follow-up next week.  Cardiology follow-up arranged for 1/25/2024.    Continue high intensity statins.

## 2024-01-20 NOTE — INCIDENTAL FINDINGS
The following findings require follow up:  Radiographic finding   Finding: pancreatic cyst   Follow up required: MRI   Follow up should be done within 1  month(s)    Please notify the following clinician to assist with the follow up:   Gastroenterology or primary care physician

## 2024-01-20 NOTE — ASSESSMENT & PLAN NOTE
Lab Results   Component Value Date    EGFR 10 01/20/2024    EGFR 16 01/19/2024    EGFR 13 01/18/2024    CREATININE 5.85 (H) 01/20/2024    CREATININE 3.90 (H) 01/19/2024    CREATININE 4.55 (H) 01/18/2024   Dialysis regimen: Tuesday, Thursday, Saturday via right arm AV fistula   Remove HD catheter prior to discharge

## 2024-01-22 ENCOUNTER — APPOINTMENT (OUTPATIENT)
Dept: PHYSICAL THERAPY | Facility: CLINIC | Age: 54
End: 2024-01-22
Payer: MEDICARE

## 2024-01-22 ENCOUNTER — TRANSITIONAL CARE MANAGEMENT (OUTPATIENT)
Dept: FAMILY MEDICINE CLINIC | Facility: CLINIC | Age: 54
End: 2024-01-22

## 2024-01-23 NOTE — PROGRESS NOTES
Cardiology Follow Up    Berto Faria  1970  [id number]  [facility] [address] [id number] [phone]    1. History of ischemic cardiomyopathy  Echo complete w/ contrast if indicated      2. Coronary artery disease involving native coronary artery of native heart without angina pectoris        3. Hypertension secondary to other renal disorders        4. Gastrointestinal hemorrhage with hematemesis        5. Type 1 diabetes mellitus on insulin therapy ([facility])        6. Valvular heart disease        7. Chronic anemia        8. PAF (paroxysmal atrial fibrillation) ([facility])        9. Chronic combined systolic (congestive) and diastolic (congestive) heart failure ([facility])        10. Hyperlipidemia, unspecified hyperlipidemia type            Discussion/Summary:    CAD  Prior hx of MARC to circumflex and prox LAD in 2018  [facility] during recent admit: Multivessel CAD including critical in-stent restenosis of the left main to ostial LAD stent and significant in-stent restenosis of the ostial circumflex stent,  of the RCA, not surgical candidate.    Now status post complex PCI of the ostial proximal LAD and ostial circumflex with drug-eluting stents using Impella support   Discharged on aspirin/statin and Plavix; not on beta blocker presumably due to hypotension/shock during recent admit  Given recent GI bleed/hemorrhagic shock I reviewed his case with Dr Bloom (performing interventional cardiologist) and the patient may stop aspirin one month after PCI (2/19/2024) but must continue plavix and will continue Eliquis for PAF  He has no complaints of chest pain  He plans to attend cardiac rehab when able     Ischemic cardiomyopathy  EF at time of stent placement in 2018 was 40%   Had been recovered and EF was 60% on echo from 8-2023  Echo from 1/11/2024: EF 32% with severe global hypokinesis with specific  "regional wall abnormalities in the anteroseptal, inferior and apical regions. Diastolic function is abnormal; moderate MR/TR  LHC as above  Volume status managed by HD  He was not discharge on any GDMT given hypotension/shock during recent admit and BP remains too low today to begin GDMT  We will recheck echo in 3 months    Chronic HFrEF  He does appear volume overloaded on exam; his volume status is maintained by HD as he makes very little if any urine  He did have HD today (Tues, Thurs, Sat schedule); has history of right AKA--- left leg with at least +2 lower extremity edema; bibasilar crackles noted; he notes orthopnea preventing him from having adequate sleep--he is not hypoxic and no SOB at rest  I do not think he requires ED visit but I cannot assist with volume given he cannot take oral diuretics---he states he had an extra HD session this week due to volume overload, I have asked that he contact HD team for further recommendations  He follow low sodium diet    Valvular heart disease  Moderate MR/TR noted on echo  Volume managed by HD    Hx of GI bleed/chronic anemia  recent GI bleed/hemorrhagic shock while inpatient in the setting of bleeding duodenal ulcer now s/p clipping; Hgb a low as 4.9 requiring IV transfusion  Hgb on discharge on 1/20/2024 was 9.2    PAF  New onset on 1/11/2024 during recent admit with conversion to NSR with IV amiodarone  Had maintained NSR thereafter and d/c'd on amiodarone 200 mg QD as well as renal dosed Eliquis for stroke prevention  He did have GI bleed/hemorrhagic shock during admit in the setting of bleeding duodenal ulcer now s/p clipping; Hgb on d/c was 9.2  Rhythm regular on exam  No complaints of palpitations      Essential HTN  He had previously been on nifedipine 90 mg QD and Carvedilol 6.25 mg BID but he was recently d/c'd on no antihypertensive medications given hypotension/shock during admit  Today BP remains \"soft\"; he has no dizziness/lightheadedness    DM1  HgbA1c " 7.3 in May 2023    hyperlipidemia  Lipid panel from May 2023  Triglycerides 51  HDL 43  LDL 20  Continue Lipitor 80 mg QD     He will return to the office in 4 months to follow up with Dr. Nieto     Interval History:   Berto Faria is a 53 y.o. male with PMH of CAD, PAF, ICM, HTN, DM1, chronic HFrEF, HLD, right AKA, prior GI bleed with ACD, valvular heart disease and ESRD on HD with prior renal transplant in 2001 who returns to the office today for post hospital visit.   On 1/7/2024 the patient presented to [facility] ED secondary to complaints of shortness of breath and noting he was hypoxic at home.  CT scan on admission did note multifocal pneumonia.  The patient was being treated with IV antibiotics and was overall stable for the first 24 hours of admission.  However, the day after admission the patient did have both respiratory and hemodynamic decompensation requiring transfer to the ICU with initiation of Levophed.  Echocardiogram at that time showed reduced LVEF down to 40% and peak troponin of 11,900.  EKG did show some lateral changes concerning for NSTEMI and the patient was started on IV heparin.  Collaborative discussion amongst internal medicine, pulmonology, nephrology and cardiology determined that the patient would best be served with transfer to [facility] for CVVH and cardiology evaluation. At that time patient etiology behind shock-like state presumed to be septic in the setting of multi-focal pneumonia. On 1/11/2024 he was noted to go into new onset Afib/RVR with subsequent hypotension requiring transfer back to ICU with need for IV pressor support again.  He was also placed on IV amiodarone with conversion to normal sinus rhythm.  He was placed on IV heparin for stroke prevention given elevated [id number] score.  Repeat echocardiogram at that time now shows LVEF of 32% with regional wall motion abnormalities.  Shock now presumed to be mixed septic/cardiogenic and he was  started on milrinone.  He underwent cardiac cath on 1/12/2024 showing: Multivessel CAD including critical in-stent restenosis of the left main to ostial LAD stent and significant in-stent restenosis of the ostial circumflex stent,  of the RCA.  He was evaluated by CT surgery and deemed to not be a surgical candidate.  He returned to the cardiac Cath Lab on 1/18/2023 and is now status post complex PCI of the ostial proximal LAD and ostial circumflex with drug-eluting stents using Impella support. On the evening of 1/13/2024 the patient was noted to be hypotensive despite IV Levophed and stat hemoglobin came back as 4.9.  Shortly thereafter patient noted to have large melenic stool.  Patient received transfusion and subsequent EGD revealed  duodenal ulcer now status post clipping with Ovesco. Outpatient repeat EGD recommended in 3 months. The patient's clinical status progressively improved and he was deemed stable for discharge on 1/20/2024.     Today, the patient states he continues with left lower extremity edema (history of right AKA) and orthopnea.  Patient notes that he is not ambulating secondary to inability to get his prosthetic leg on in the setting of edema.  However, he does roll himself around in his wheelchair and can perform this activity with no shortness of breath.  He states he does continue to get fatigued easily.  He was unable to be weighed today secondary to his inability to stand so I do not know his weight.  He is having increased abdominal girth and has some abdominal distention.  He has at least +2 left lower extremity edema on physical exam and bibasilar crackles.  He is not hypoxic with adequate O2 sats on room air.  During his daily activities he has no complaints of chest discomfort or palpitations.  He also has no dizziness/lightheadedness or near syncope/syncope.  The patient did have HD treatment today as he is on a Tuesday/Thursday/Saturday schedule.  He did, in fact, have an extra  dialysis session this past Monday secondary to volume overload since his discharge from the hospital.  He states his main complaint is that of orthopnea making it difficult for him to sleep at night.  The patient makes very little to no urine so I would not be able to assist him with volume status using diuretics.  I do not believe he needs ED visit given he is not hypoxic but I have asked him to contact HD team to see if perhaps he may need an additional treatment this week and/or possibly taking more fluid off during his Saturday appointment.  The patient agrees with this plan.  We did review all of the recent events of his hospitalization.  We did review his medications.  He will remain on aspirin/Plavix/Eliquis until February 19 which would be 1 month post PCI at which time he can stop aspirin but must remain on Plavix and Eliquis.  His blood pressure remains too low today to add goal-directed medical therapy for cardiomyopathy.  Lipid panel from May 2023 is acceptable.  He knows to follow a low-sodium diet.  We will order updated echocardiogram to be done in late April 2024 to reevaluate LVEF.    Medical Problems       Problem List       Type 1 diabetes mellitus on insulin therapy ([facility])      Lab Results   Component Value Date    HGBA1C 7.7 (H) 01/07/2024         Renal transplant, status post    Hyperkalemia    Osteomyelitis ([facility])    Poor circulation    Closed fracture of distal end of right tibia with routine healing    Chronic osteomyelitis of tibia ([facility])    Immunosuppression ([facility])    Hypertension    Elevated troponin    Diarrhea    Cellulitis of ankle    NSTEMI (non-ST elevated myocardial infarction) ([facility])    Urinary retention (Chronic)    Severe sepsis ([facility])    Hyperphosphatemia    Hyponatremia    GI bleed w/ Hemorrhagic Shock    Overview Signed 12/20/2018 12:49 PM by GAVINO Michelle     Added automatically from request for surgery [id number]         S/P AKA (above knee amputation), right ([facility])    Acute  blood loss anemia    Pulmonary hypertension (HCC)    Acute on chronic anemia    Depressed left ventricular ejection fraction    Acute pulmonary edema ([facility])    ESRD (end stage renal disease) ([facility])    Lab Results   Component Value Date    EGFR 10 01/20/2024    EGFR 16 01/19/2024    EGFR 13 01/18/2024    CREATININE 5.85 (H) 01/20/2024    CREATININE 3.90 (H) 01/19/2024    CREATININE 4.55 (H) 01/18/2024         Coronary artery disease involving native coronary artery    Pre-operative cardiovascular examination    Chronic kidney disease, unspecified    Overview Signed 1/15/2020  1:38 PM by Carlos Medina MD     Added automatically from request for surgery [id number]         Lab Results   Component Value Date    EGFR 10 01/20/2024    EGFR 16 01/19/2024    EGFR 13 01/18/2024    CREATININE 5.85 (H) 01/20/2024    CREATININE 3.90 (H) 01/19/2024    CREATININE 4.55 (H) 01/18/2024         Pancreatic lesion    Abnormal CT scan, colon    Community acquired pneumonia of right lower lobe of lung    Multifocal pneumonia    Elevated MCV    Hyperbilirubinemia    Acute on chronic diastolic CHF (congestive heart failure) ([facility])    Wt Readings from Last 3 Encounters:   01/19/24 (!) 0.048 kg (1.7 oz)   01/08/24 50 kg (110 lb 3.7 oz)   08/21/23 51.4 kg (113 lb 6.4 oz)                 Elevated procalcitonin    Mitral valve insufficiency    Abnormal EKG    Vasovagal syncope    Acute on chronic diastolic (congestive) heart failure ([facility])    Wt Readings from Last 3 Encounters:   01/19/24 (!) 0.048 kg (1.7 oz)   01/08/24 50 kg (110 lb 3.7 oz)   08/21/23 51.4 kg (113 lb 6.4 oz)                 Hx of AKA (above knee amputation) ([facility])    Renal transplant failure and rejection    New onset a-fib ([facility])        Past Medical History:   Diagnosis Date    Bacteremia 12/21/2018    CAD (coronary artery disease)     s/p MARC to LCx, pLAD 2/2018    Cardiac arrest ([facility])     Chronic kidney disease     Diabetes mellitus type 1 ([facility])     Dialysis patient ([facility])     Access  in right chest    GI bleed     Hyperlipidemia     Hypertension     Infection at site of external fixator pin (H[facility])     MI (myocardial infarction) ([facility])     Myocardial infarction (H[facility])     Pneumonia     Last Assessed [id number]    Renal failure     Renal transplant, status post 07/21/2007     Social History     Socioeconomic History    Marital status: /Civil Union     Spouse name: Not on file    Number of children: Not on file    Years of education: Not on file    Highest education level: Not on file   Occupational History    Not on file   Tobacco Use    Smoking status: Never    Smokeless tobacco: Never   Vaping Use    Vaping status: Never Used   Substance and Sexual Activity    Alcohol use: Not Currently     Alcohol/week: 0.0 standard drinks of alcohol    Drug use: Never    Sexual activity: Yes     Partners: Female   Other Topics Concern    Not on file   Social History Narrative    No living will     Social Determinants of Health     Financial Resource Strain: Not on file   Food Insecurity: No Food Insecurity (1/10/2024)    Hunger Vital Sign     Worried About Running Out of Food in the Last Year: Never true     Ran Out of Food in the Last Year: Never true   Transportation Needs: No Transportation Needs (1/10/2024)    PRAPARE - Transportation     Lack of Transportation (Medical): No     Lack of Transportation (Non-Medical): No   Physical Activity: Not on file   Stress: Not on file   Social Connections: Not on file   Intimate Partner Violence: Not on file   Housing Stability: Low Risk  (1/10/2024)    Housing Stability Vital Sign     Unable to Pay for Housing in the Last Year: No     Number of Places Lived in the Last Year: 1     Unstable Housing in the Last Year: No      Family History   Problem Relation Age of Onset    Diabetes Brother     Coronary artery disease Mother     No Known Problems Father      Past Surgical History:   Procedure Laterality Date    AMPUTATION Right 02/2019    aboce knee    ANKLE  FRACTURE SURGERY      ANKLE HARDWARE REMOVAL Right 7/31/2017    Procedure: REMOVAL HARDWARE ANKLE;  Surgeon: Alvin Leon MD;  Location: [facility];  Service: Orthopedics    ANKLE HARDWARE REMOVAL Right 8/17/2017    Procedure: TIBIA FAILED HARDWARE REMOVAL;  Surgeon: Alvin Leon MD;  Location: [facility];  Service: Orthopedics    CARDIAC CATHETERIZATION Left 1/12/2024    Procedure: Cardiac Left Heart Cath;  Surgeon: Spencer Bloom MD;  [facility]: [facility];  Service: Cardiology    CARDIAC CATHETERIZATION N/A 1/18/2024    Procedure: Cardiac pci;  Surgeon: Spencer Bloom MD;  [facility];  Service: Cardiology    CARDIAC CATHETERIZATION N/A 1/18/2024    Procedure: Cardiac Impella Insertion;  Surgeon: Spencer Bloom MD;  [facility]: [facility];  Service: Cardiology    CLOSED REDUCTION ANKLE Right 7/3/2017    Procedure: CLOSED REDUCTION DISTAL TIB-FIB AND CASTING VS;  Surgeon: Alvin Leon MD;  Location: [facility];  Service: Orthopedics    CORONARY ANGIOPLASTY WITH STENT PLACEMENT  02/2018    CAD s/p MARC to LCx, pLAD    ESOPHAGOGASTRODUODENOSCOPY N/A 12/20/2018    Procedure: ESOPHAGOGASTRODUODENOSCOPY (EGD) in ICU;  Surgeon: Galileo Wise IV, MD;  Location: [facility];  Service: Gastroenterology    EYE SURGERY      FRACTURE SURGERY      ORIF Rt Ankle    GLUTAMIC ACID DECARBOXYLASE (HISTORICAL)      IR AV FISTULAGRAM/GRAFTOGRAM  4/16/2020    IR AV FISTULAGRAM/GRAFTOGRAM  6/23/2023    IR PICC LINE  8/20/2018    IR TEMPORARY DIALYSIS CATHETER CHECK/CHANGE/REPOSITION  1/8/2024    IR TUNNELED DIALYSIS CATHETER CHECK/CHANGE/REPOSITION/ANGIOPLASTY  1/8/2020    IR TUNNELED DIALYSIS CATHETER PLACEMENT  10/21/2019    IR TUNNELED DIALYSIS CATHETER REMOVAL  5/29/2020    IR VENOUS LINE REMOVAL  10/5/2018    LEG AMPUTATION Right 02/01/2019    Above the knee    NEPHRECTOMY TRANSPLANTED ORGAN      [place] ARTERIOVENOUS ANASTOMOSIS OPEN DIRECT Right 2/11/2020    Procedure: CREATION FISTULA ARTERIOVENOUS  "(AV) right upper extremity;  Surgeon: Carlos Medina MD;  Location:  MAIN OR;  Service: Vascular    ME OPEN TREATMENT FRACTURE DISTAL TIBIA FIBULA Right 7/3/2017    Procedure: OPEN REDUCTION W/ INTERNAL FIXATION (ORIF);  Surgeon: Alvin Leon MD;  Location: [facility];  Service: Orthopedics    TOE AMPUTATION Right 10/27/2016    Procedure: AMPUTATION TOE;  Surgeon: Krishna Ruiz DPM;  Location: [facility];  Service:        Current Outpatient Medications:     amiodarone 200 mg tablet, Take 1 tablet (200 mg total) by mouth daily with breakfast Do not start before January 21, 2024., Disp: 30 tablet, Rfl: 0    apixaban (ELIQUIS) 2.5 mg, Take 1 tablet (2.5 mg total) by mouth 2 (two) times a day, Disp: 60 tablet, Rfl: 0    aspirin 81 mg chewable tablet, Chew 81 mg every morning , Disp: , Rfl:     atorvastatin (LIPITOR) 80 mg tablet, Take 1 tablet (80 mg total) by mouth daily, Disp: 90 tablet, Rfl: 3    calcitriol (ROCALTROL) 0.5 MCG capsule, Take 1 capsule (0.5 mcg total) by mouth 3 (three) times a week, Disp: 12 capsule, Rfl: 0    cinacalcet (SENSIPAR) 30 mg tablet, Take 1 tablet (30 mg total) by mouth daily, Disp: 90 tablet, Rfl: 3    clopidogrel (PLAVIX) 75 mg tablet, Take 1 tablet (75 mg total) by mouth daily, Disp: 30 tablet, Rfl: 0    Glucagon, rDNA, (Glucagon Emergency) 1 MG KIT, INJECT 1MG SUBCUTANEOUSLY ONCE AS NEEDED FOR LOW BLOOD SUGAR FOR UP TO 1 DOSE, Disp: 2 kit, Rfl: 0    glucose blood (Contour Next Test) test strip, Use as instructed 6 times daily, Disp: 600 strip, Rfl: 5    insulin aspart (NovoLOG) 100 units/mL injection, 8 Units by Subcutaneous Insulin Pump route 2 (two) times a day with meals, Disp: , Rfl:     insulin glargine (Semglee) 100 units/mL subcutaneous injection, Inject 10 Units under the skin every 12 (twelve) hours, Disp: 20 mL, Rfl: 3    Insulin Syringe-Needle U-100 31G X 15/64\" 0.5 ML MISC, Use 3 (three) times a day, Disp: 100 each, Rfl: 4    midodrine (PROAMATINE) 10 MG tablet, Take 1 " "tablet (10 mg total) by mouth 3 (three) times a day before meals, Disp: 90 tablet, Rfl: 0    midodrine (PROAMATINE) 5 mg tablet, Take 1 tablet (5 mg total) by mouth 3 (three) times a week Before dialysis (Patient taking differently: Take 5 mg by mouth 3 (three) times a week Before dialysis Tues thurs Sat), Disp: 12 tablet, Rfl: 0    multivitamin (THERAGRAN) TABS, Take 1 tablet by mouth daily, Disp: , Rfl:     pantoprazole (PROTONIX) 40 mg tablet, Take 1 tablet (40 mg total) by mouth 2 (two) times a day, Disp: 60 tablet, Rfl: 0    predniSONE 5 mg tablet, Take 1 tablet (5 mg total) by mouth daily, Disp: 90 tablet, Rfl: 3    tacrolimus (PROGRAF) 1 mg capsule, 2 caps in the morning and 1 cap in the evening, Disp: 270 capsule, Rfl: 3  No Known Allergies    Labs:     Chemistry        Component Value Date/Time     11/06/2015 0734    K 5.3 01/20/2024 0516    K 4.4 11/06/2015 0734    CL 96 01/20/2024 0516     11/06/2015 0734    CO2 22 01/20/2024 0516    CO2 13 (L) 02/14/2018 0133    BUN 53 (H) 01/20/2024 0516    BUN 31 (H) 11/06/2015 0734    CREATININE 5.85 (H) 01/20/2024 0516    CREATININE 1.26 11/06/2015 0734        Component Value Date/Time    CALCIUM 7.7 (L) 01/20/2024 0516    CALCIUM 8.8 11/06/2015 0734    ALKPHOS 115 (H) 01/20/2024 0516    ALKPHOS 164 (H) 11/06/2015 0734     (H) 01/20/2024 0516    AST 12 11/06/2015 0734     (H) 01/20/2024 0516    ALT 34 11/06/2015 0734    BILITOT 0.45 11/06/2015 0734            Lab Results   Component Value Date    CHOL 105 08/18/2015     Lab Results   Component Value Date    HDL 43 05/17/2023    HDL 41 08/21/2022    HDL 35 (L) 05/22/2021     Lab Results   Component Value Date    LDLCALC 20 05/17/2023    LDLCALC 29 08/21/2022    LDLCALC 37 05/22/2021     Lab Results   Component Value Date    TRIG 51 05/17/2023    TRIG 54 08/21/2022    TRIG 51 05/22/2021     No results found for: \"CHOLHDL\"    Imaging: Cardiac catheterization    Result Date: " 1/18/2024  Narrative:   Ost LAD lesion is 99% stenosed.   Ost Cx lesion is 90% stenosed. PCI Os LAD, Os LCX     Flexible Sigmoidoscopy    Result Date: 1/14/2024  Narrative: Table formatting from the original result was not included. [facility] Operating Room [address] [facility] [address] [phone] DATE OF SERVICE: 1/14/24 PHYSICIAN(S): Attending: Ramon Marcelino MD Fellow: No Staff Documented INDICATION: GI bleeding POST-OP DIAGNOSIS: See the impression below. HISTORY: Prior colonoscopy: No prior colonoscopy. BOWEL PREPARATION:  PREPROCEDURE: Informed consent was obtained for the procedure, including sedation. Risks including but not limited to bleeding, infection, perforation, adverse drug reaction and aspiration were explained in detail. Also explained about less than 100% sensitivity with the exam and other alternatives. The patient was placed in the left lateral decubitus position. Procedure: Colonoscopy DETAILS OF PROCEDURE: Patient was taken to the procedure room where a time out was performed to confirm correct patient and correct procedure. The patient underwent monitored anesthesia care, which was administered by an anesthesia professional. The patient's blood pressure, heart rate, level of consciousness, oxygen, respirations, ECG and ETCO2 were monitored throughout the procedure. A digital rectal exam was performed. The scope was introduced through the anus and advanced to the descending colon. The quality of bowel preparation was evaluated using the [place] Bowel Preparation Scale with scores of: transverse colon = 0, left colon = 0. Bowel prep was not adequate. The patient experienced no blood loss. The procedure was difficult due to poor preparation. The patient tolerated the procedure well. There were no apparent adverse events. ANESTHESIA INFORMATION: ASA: IV Anesthesia Type: IV Sedation with Anesthesia MEDICATIONS: NxStage K 4/Ca 3 dialysis solution (RFP-401) 20,000 mL 20,000 mL  (Totals for administrations occurring from 1340 to 1450 on 01/14/24) FINDINGS: Significant amount of blood present in the descending colon, sigmoid colon and rectum, which obscured visualization. Significant amount of old red/black blood and stool coating colon. Advanced to descending colon with intermittent washing to evaluate areas of mucosa underneath blood/stool and noted to have healthy appearing mucosa. No signs of ischemic change noted. EVENTS: Procedure Events Event Event Time ENDO SCOPE OUT TIME 1/14/2024  2:32 PM ENDO SCOPE OUT TIME 1/14/2024  2:40 PM SPECIMENS: ID Type Source Tests Collected by Time Destination 1 : Gastric biopsy Tissue Stomach TISSUE EXAM Ramon Marcelino MD 1/14/2024  2:16 PM  EQUIPMENT: Scope not documented     Impression: Significant amount of blood/stool present in the descending colon, sigmoid colon and rectum, which obscured visualization. Utilized water for intermittent washing of mucosa, which revealed healthy appearing mucosa without ischemic changes identified RECOMMENDATION:  Schedule full colonoscopy - Patient with eventually need full screening colonoscopy once acute conditions are managed.  Return to floors Continue checking serial hgb q8 hours Resuscitation per CC team Transfuse for hgb <7.0 Continue PPI therapy as detailed in EGD report    Ramon Marcelino MD     EGD    Result Date: 1/14/2024  Narrative: Table formatting from the original result was not included. [facility] Operating Room [address] [place] [address] [phone] DATE OF SERVICE: 1/14/24 PHYSICIAN(S): Attending: Ramon Marcelino MD Fellow: No Staff Documented INDICATION: GI bleeding POST-OP DIAGNOSIS: See the impression below. PREPROCEDURE: Informed consent was obtained for the procedure, including sedation.  Risks of perforation, hemorrhage, adverse drug reaction and aspiration were discussed. The patient was placed in the left lateral decubitus position. Patient was explained about the risks  and benefits of the procedure. Risks including but not limited to bleeding, infection, and perforation were explained in detail. Also explained about less than 100% sensitivity with the exam and other alternatives. PROCEDURE: EGD DETAILS OF PROCEDURE: Patient was taken to the procedure room where a time out was performed to confirm correct patient and correct procedure. The patient underwent monitored anesthesia care, which was administered by an anesthesia professional. The patient's blood pressure, heart rate, level of consciousness, respirations, oxygen, ECG and ETCO2 were monitored throughout the procedure. The scope was introduced through the mouth and advanced to the second part of the duodenum. Retroflexion was performed in the cardia, fundus and incisura. Prior to the procedure, the patient's H. Pylori status was unknown. The patient experienced no blood loss. The procedure was not difficult. The patient tolerated the procedure well. There were no apparent adverse events. ANESTHESIA INFORMATION: ASA: IV Anesthesia Type: IV Sedation with Anesthesia MEDICATIONS: NxStage K 4/Ca 3 dialysis solution (RFP-401) 20,000 mL 20,000 mL (Totals for administrations occurring from 1340 to 1450 on 01/14/24) FINDINGS: [place]-colored mucosa in the lower third of the esophagus, consistent with Scales's esophagus. C1M0 suspected Scales's. Not biopsied in setting of active bleeding. The upper third of the esophagus and middle third of the esophagus appeared normal. Z-line is 38 cm from the incisors. Moderate, generalized edematous and erythematous mucosa in the cardia, fundus of the stomach, body of the stomach, greater curve of the stomach, lesser curve of the stomach, incisura, antrum and prepyloric region, consistent with gastritis; no bleeding was identified; performed cold forceps biopsy to rule out H. pylori Single cratered ulcer in the 1st part of the duodenum with nonbleeding visible vessel (Joshua IIA); placed 2  clips successfully and 1 clip unsuccessfully (clips are over-the-scope and through-the-scope); hemostasis achieved. - through the scope clip placed successfully with subsequent bleeding. Due to difficulty of location of ulcer and need for therapeutic anticoagulation vs DAPT in future, decision made to proceed with over the scope clip. Achieved hemostasis with placement Single round, benign-appearing ulcer in the 1st part of the duodenum with flat pigmented spot (Joshua IIC) Multiple small, superficial, round, benign-appearing ulcers in the duodenal bulb, 1st part of the duodenum and 2nd part of the duodenum with clean base (Joshua III) SPECIMENS: ID Type Source Tests Collected by Time Destination 1 : Gastric biopsy Tissue Stomach TISSUE EXAM Ramon Marcelino MD 1/14/2024  2:16 PM      Impression: C0M1 Scales's esophagus. Moderate gastritis; performed cold forceps biopsy to rule out H. pylori Joshua IIA ulcer (visible vessel). Through the scope clips were unsuccessful. OVESCO (over the scope clip) applied successfully and with hemostasis. Single ulcer in the 1st part of the duodenum with flat pigmented spot (Joshua IIC) Multiple small, ulcers in the duodenal bulb, 1st part of the duodenum and 2nd part of the duodenum with clean base (Joshua III) RECOMMENDATION:  Await pathology results  Serial Hemoglobin Continue IV PPI BID - no PPI drip in setting of tenuous volume status Monitor stool ouput Proceed with flexible sigmoidoscopy Patient will eventually need repeat EGD after recovery from acute issues to confirm healing of duodenal ulcers   May resume anticoagulation/ antiplatelet therapy as per cardiology/ primary team.  Ramon Marcelino MD     VAS limited iliac-femoral evaluation for Impella Device    Result Date: 1/13/2024  Narrative:  [facility] REPORT CLINICAL: Indications: Patient presents to evaluate the Ilio-Femoral arteries prior to impella device insertion. Operative History: 2019-02-01 Right AKA  02/11/20 CREATION FISTULA ARTERIOVENOUS (AV) right upper extremity (Right Arm Upper) 02/2018 Coronary angioplasty with stent placement Right Nephrectomy with transplant Risk Factors The patient has history of HTN, Diabetes, Hyperlipidemia, CKD, PAD, A-FIB, CHF, NSTEMI and CAD.  FINDINGS:  Unilateral     PSV (cm/s)  EDV (cm/s)  AP Diam  TRV Diam  Ds Inf-Mark Ao          77           2      1.1       1.2   Right                  PSV (cm/s)  EDV (cm/s)  AP Diam  TRV Diam  Prox DAWN                      105           2      0.6       0.6  Dist DAWN                       75           0      0.7            Prox. EIA                     103           2      0.5            Dist EIA                      102           0      0.4            Common Femoral Artery          81           8      0.6            Prox SFA                       57           1      0.4             Left                   PSV (cm/s)  EDV (cm/s)  AP Diam  Prox DAWN                      112           0      0.9  Dist DAWN                       53           0      0.6  Prox. EIA                      93           0      0.5  Dist EIA                      114           2      0.7  Common Femoral Artery         117           0      0.6  Prox SFA                       97           0      0.4     CONCLUSION:  Impression  DISTAL AORTA: The distal abdominal aorta is patent and normal in caliber with the diameter measurement of 1.1 cm.  ILIO-FEMORAL: The right ilio-femoral arteries measurements ranged from 0.4 cm to 0.7 cm. The left ilio-femoral arteries measurements ranged from 0.4 cm to 0.9 cm. There is diffuse disease noted throughout the external iliac, common femoral and superficial femoral arteries bilaterally.  Incidental Finding: There is a hypoechoic non-vascularized structure in the left groin anterior to the iliac vessels measuring approximately 3.3 x 2.2 x 3.1 cm.  SIGNATURE: Electronically Signed by: CARRIE MATT DO on 2024-01-13 09:51:20 PM    Cardiac  catheterization    Result Date: 1/12/2024  Narrative:   Ost LAD lesion is 95% stenosed.   Ost Cx to Prox Cx lesion is 75% stenosed.   Prox RCA lesion is 100% stenosed.   Mid LAD lesion is 60% stenosed.   Dist LM lesion is 50% stenosed.   1st Mrg lesion is 100% stenosed. Severe in-stent restenosis bifurcation ostial LAD/LCX Occluded RCA () Moderately elevated LVEDP Porcelain aorta     Echo follow up/limited w/ contrast if indicated    Result Date: 1/11/2024  Narrative:   Left Ventricle: Left ventricular cavity size is mildly dilated. Wall thickness is normal. There is eccentric hypertrophy. The left ventricular ejection fraction is 32%. Systolic function is severely reduced. There is severe global hypokinesis with specific regional wall abnormalities in the anteroseptal, inferior and apical regions. Diastolic function is abnormal.   Left Atrium: The atrium is moderately dilated.   Mitral Valve: There is mild to moderate regurgitation.   Tricuspid Valve: There is mild to moderate regurgitation. The right ventricular systolic pressure is moderately elevated. The estimated right ventricular systolic pressure is 57.00 mmHg.     IR temporary dialysis catheter check/change/reposition    Result Date: 1/9/2024  Narrative: PROCEDURE: Fluoroscopic guided nontunneled dialysis catheter repositioning. STAFF: Tony Francisco M.D. CONTRAST: 10 mL Omnipaque intravenous. FLUOROSCOPY TIME: 2.1 minutes. NUMBER OF IMAGES: Multiple COMPLICATIONS: None. MEDICATIONS: 1% lidocaine subcutaneous. INDICATION: Malpositioned right internal jugular nontunneled dialysis catheter. PROCEDURE: Venography was performed to confirm intraluminal location. Over the wire, the existing catheter was retracted. The wire was repositioned into the inferior vena cava. Over the wire, a new 20 cm 12.5 [place] trialysis catheter was extended, with the tip in the right atrium. FINDINGS: 1.  Fluoroscopy showed the existing catheter to terminate in the right  subclavian vein. This was confirmed with venography. 2.  Final fluoroscopic image showed good positioning of the new catheter in the right atrium.     Impression: Nontunneled dialysis catheter repositioning. Workstation performed: [id number]     XR chest portable ICU    Result Date: 1/8/2024  Narrative: CHEST INDICATION:   Line placement. COMPARISON: 2024 at 1121 hours EXAM PERFORMED/VIEWS:  XR CHEST PORTABLE ICU FINDINGS: Right IJ line unchanged presentation superimposed over right upper lung, presumably within right subclavian vein. Mild cardiomegaly with slight increased mild vascular congestion. Persistent bibasilar airspace opacities right greater with increased density. Small right pleural effusion no pneumothorax.. The lungs are clear.  No pneumothorax or pleural effusion. Osseous structures appear within normal limits for patient age.     Impression: No change right IJ line presumed within right subclavian vein. Mild cardiomegaly. Mild vascular congestion. Persistent bibasilar infiltrates under atelectasis, worsening on the right. Small right effusion. The study was marked in EPIC for immediate notification. Workstation performed: [id number]     US bedside procedure    Result Date: 1/8/2024  Narrative: [id number]    XR chest portable ICU    Result Date: 1/8/2024  Narrative: CHEST INDICATION:   line placement. COMPARISON: Chest x-ray 1/7/2024. CT CHEST: 1. 7/20/2024 EXAM PERFORMED/VIEWS:  XR CHEST PORTABLE ICU FINDINGS: Right internal jugular CVC is seen with its distal portions coursing laterally and projecting over the right upper lung, favored to be within the right subclavian vein. Cardiomediastinal silhouette appears unremarkable. Bibasilar airspace opacities are again demonstrated, right worse on left. The opacities are improved from radiograph of 1/7/2024. Interstitial markings are prominent. No appreciable pneumothorax. No evidence of acute osseous  abnormality.     Impression: 1. Malpositioned right internal jugular central venous catheter with its distal portions projecting within the right subclavian vein. 2. Interval improvement of bibasilar airspace opacities as compared to radiograph of 1/7/2024. Opacities are again worse on the right. I personally discussed findings regarding central line placement with JANET MUSCO on 1/8/2024 11:30 AM. Workstation performed: [id number]     Echo follow up/limited w/ contrast if indicated    Result Date: 1/8/2024  Narrative:   Left Ventricle: Left ventricular cavity size is normal. Wall thickness is mildly increased. The left ventricular ejection fraction is 40%. Systolic function is moderately reduced. Left atrial filling pressure is elevated.   The following segments are hypokinetic: mid anteroseptal, mid inferior, apical anterior, apical septal, apical inferior and apex.   All other segments are normal.   Right Ventricle: Systolic function is normal.   Mitral Valve: There is mild regurgitation.   Tricuspid Valve: There is mild regurgitation. Limited study.      XR chest 1 view portable    Result Date: 1/7/2024  Narrative: CHEST INDICATION:   hypoxia. COMPARISON: Chest radiograph April 27, 2023 EXAM PERFORMED/VIEWS:  XR CHEST PORTABLE FINDINGS: Cardiomediastinal silhouette appears unremarkable. New bibasilar consolidations, right larger than left. Increased prominence of the interstitial markings. No definite pleural effusion. No pneumothorax. Osseous structures appear within normal limits for patient age.     Impression: Pneumonia and pulmonary edema. Workstation performed: [id number]     CT chest without contrast    Result Date: 1/7/2024  Narrative: CT CHEST WITHOUT IV CONTRAST INDICATION:   Pneumonia, effusion or abscess suspected, xray done pna. COMPARISON: CT chest 10/11/2022, CT abdomen and pelvis 10/1/2022 and MRI abdomen 1/20/2022. TECHNIQUE: CT examination of the chest was performed without intravenous  contrast. Multiplanar 2D reformatted images were created from the source data. This examination, like all CT scans performed in the [facility], was performed utilizing techniques to minimize radiation dose exposure, including the use of iterative reconstruction and automated exposure control. Radiation dose length product (DLP) for this visit:  193.45 mGy-cm FINDINGS: LUNGS: Bilateral multi lobar consolidation more prominent in the lower lobes. Bilateral multi lobar groundglass opacities, right greater than left. Minimal bibasilar smooth septal thickening. Trace mucus in the trachea. PLEURA: Small bilateral pleural effusions. HEART/GREAT VESSELS: Heart is enlarged.  No pericardial effusion.  Aortic and coronary artery calcification. No thoracic aortic aneurysm. MEDIASTINUM AND DONNY:  Unremarkable. CHEST WALL AND LOWER NECK: Minimal bilateral gynecomastia. VISUALIZED STRUCTURES IN THE UPPER ABDOMEN: Markedly atrophic kidneys with bilateral cysts. Small cysts in the body of the pancreas, the larger of which measures 1.6 cm. No significant interval change since January 2022 allowing for difference in imaging  technique. OSSEOUS STRUCTURES: No acute fracture or osseous destructive lesion identified. Mild degenerative changes of the spine. Mild diffuse renal osteodystrophy.     Impression: Bilateral multi lobar airspace consolidation, groundglass opacities, smooth septal thickening and small bilateral pleural effusions attributed to pulmonary vascular congestion. Underlying superimposed pneumonia is not excluded. Correlate with clinical findings. Small cysts in the partially visualized pancreas measuring up to 1.6 cm grossly unchanged since January 2022. Advise nonemergent outpatient abdomen MRI/MRCP follow-up to assess 2-year stability. Additional chronic findings and negatives as above. This study demonstrates a significant  finding and was documented as such in [facility] for liaison and referring  "practitioner notification. This study demonstrates a finding requiring imaging follow-up and was documented as such in Epic. Workstation performed: [id number]       Review of Systems   Constitutional: Negative for chills, fever and malaise/fatigue.   HENT:  Negative for congestion.    Cardiovascular:  Positive for leg swelling and orthopnea. Negative for chest pain and palpitations.   Respiratory:  Negative for cough, shortness of breath (no SOB at rest) and wheezing.    Endocrine: Negative.    Hematologic/Lymphatic: Negative.    Skin: Negative.    Musculoskeletal: Negative.    Gastrointestinal:  Negative for bloating, abdominal pain, nausea and vomiting.   Genitourinary: Negative.    Neurological:  Negative for dizziness and light-headedness.   Psychiatric/Behavioral: Negative.     All other systems reviewed and are negative.      Vitals:    24 1401   BP: 100/52   Pulse: 68     Vitals:     Height: 5' 4\" (162.6 cm)   Body mass index is 0.02 kg/m².    Physical Exam:  Physical Exam  Vitals reviewed.   Constitutional:       General: He is not in acute distress.  HENT:      Head: Normocephalic and atraumatic.      Mouth/Throat:      Mouth: Mucous membranes are moist.   Cardiovascular:      Rate and Rhythm: Normal rate and regular rhythm.      Heart sounds: Normal heart sounds, S1 normal and S2 normal. No murmur heard.  Pulmonary:      Effort: Pulmonary effort is normal. No respiratory distress.      Breath sounds: Examination of the right-middle field reveals rales. Examination of the right-lower field reveals rales. Examination of the left-lower field reveals rales. Rales present.   Abdominal:      General: Bowel sounds are normal. There is no distension.      Palpations: Abdomen is soft.   Musculoskeletal:         General: Normal range of motion.      Cervical back: Normal range of motion and neck supple.      Right lower leg: No edema.      Left lower le+ Edema present.   Skin:     General: Skin is warm and " dry.   Neurological:      Mental Status: He is alert and oriented to person, place, and time.   Psychiatric:         Mood and Affect: Mood normal.

## 2024-01-25 ENCOUNTER — OFFICE VISIT (OUTPATIENT)
Dept: CARDIOLOGY CLINIC | Facility: HOSPITAL | Age: 54
End: 2024-01-25
Payer: MEDICARE

## 2024-01-25 VITALS
BODY MASS INDEX: 0.02 KG/M2 | DIASTOLIC BLOOD PRESSURE: 52 MMHG | HEART RATE: 68 BPM | HEIGHT: 64 IN | SYSTOLIC BLOOD PRESSURE: 100 MMHG

## 2024-01-25 DIAGNOSIS — D64.9 CHRONIC ANEMIA: ICD-10-CM

## 2024-01-25 DIAGNOSIS — I15.1 HYPERTENSION SECONDARY TO OTHER RENAL DISORDERS: ICD-10-CM

## 2024-01-25 DIAGNOSIS — E78.5 HYPERLIPIDEMIA, UNSPECIFIED HYPERLIPIDEMIA TYPE: ICD-10-CM

## 2024-01-25 DIAGNOSIS — K92.0 GASTROINTESTINAL HEMORRHAGE WITH HEMATEMESIS: ICD-10-CM

## 2024-01-25 DIAGNOSIS — I25.10 CORONARY ARTERY DISEASE INVOLVING NATIVE CORONARY ARTERY OF NATIVE HEART WITHOUT ANGINA PECTORIS: ICD-10-CM

## 2024-01-25 DIAGNOSIS — Z86.79 HISTORY OF ISCHEMIC CARDIOMYOPATHY: Primary | ICD-10-CM

## 2024-01-25 DIAGNOSIS — I38 VALVULAR HEART DISEASE: ICD-10-CM

## 2024-01-25 DIAGNOSIS — I50.42 CHRONIC COMBINED SYSTOLIC (CONGESTIVE) AND DIASTOLIC (CONGESTIVE) HEART FAILURE (HCC): ICD-10-CM

## 2024-01-25 DIAGNOSIS — E10.9 TYPE 1 DIABETES MELLITUS ON INSULIN THERAPY (HCC): ICD-10-CM

## 2024-01-25 DIAGNOSIS — I48.0 PAF (PAROXYSMAL ATRIAL FIBRILLATION) (HCC): ICD-10-CM

## 2024-01-25 PROBLEM — Z94.83: Status: ACTIVE | Noted: 2024-01-25

## 2024-01-25 PROCEDURE — 99214 OFFICE O/P EST MOD 30 MIN: CPT | Performed by: NURSE PRACTITIONER

## 2024-01-25 NOTE — PATIENT INSTRUCTIONS
As of February 19th you may stop taking aspirin but continue Eliquis and Plavix  Schedule repeat echocardiogram end of April 2024

## 2024-01-26 ENCOUNTER — APPOINTMENT (OUTPATIENT)
Dept: PHYSICAL THERAPY | Facility: CLINIC | Age: 54
End: 2024-01-26
Payer: MEDICARE

## 2024-01-29 ENCOUNTER — APPOINTMENT (OUTPATIENT)
Dept: PHYSICAL THERAPY | Facility: CLINIC | Age: 54
End: 2024-01-29
Payer: MEDICARE

## 2024-02-02 ENCOUNTER — HOSPITAL ENCOUNTER (EMERGENCY)
Facility: HOSPITAL | Age: 54
Discharge: HOME | End: 2024-02-02
Attending: EMERGENCY MEDICINE
Payer: MEDICARE

## 2024-02-02 ENCOUNTER — PATIENT OUTREACH (OUTPATIENT)
Dept: FAMILY MEDICINE CLINIC | Facility: CLINIC | Age: 54
End: 2024-02-02

## 2024-02-02 ENCOUNTER — APPOINTMENT (EMERGENCY)
Dept: RADIOLOGY | Facility: HOSPITAL | Age: 54
End: 2024-02-02
Payer: MEDICARE

## 2024-02-02 ENCOUNTER — APPOINTMENT (EMERGENCY)
Dept: DIALYSIS | Facility: HOSPITAL | Age: 54
End: 2024-02-02
Payer: MEDICARE

## 2024-02-02 VITALS
OXYGEN SATURATION: 98 % | HEART RATE: 69 BPM | WEIGHT: 105.16 LBS | RESPIRATION RATE: 28 BRPM | TEMPERATURE: 97 F | SYSTOLIC BLOOD PRESSURE: 122 MMHG | DIASTOLIC BLOOD PRESSURE: 58 MMHG | BODY MASS INDEX: 18.05 KG/M2

## 2024-02-02 DIAGNOSIS — R09.02 HYPOXIA: ICD-10-CM

## 2024-02-02 DIAGNOSIS — N18.6 ESRD (END STAGE RENAL DISEASE) (HCC): Primary | ICD-10-CM

## 2024-02-02 DIAGNOSIS — Z71.89 COMPLEX CARE COORDINATION: Primary | ICD-10-CM

## 2024-02-02 LAB
2HR DELTA HS TROPONIN: -6 NG/L
4HR DELTA HS TROPONIN: -20 NG/L
ALBUMIN SERPL BCP-MCNC: 3.6 G/DL (ref 3.5–5)
ALP SERPL-CCNC: 111 U/L (ref 34–104)
ALT SERPL W P-5'-P-CCNC: 35 U/L (ref 7–52)
ANION GAP SERPL CALCULATED.3IONS-SCNC: 14 MMOL/L
ANISOCYTOSIS BLD QL SMEAR: PRESENT
AST SERPL W P-5'-P-CCNC: 44 U/L (ref 13–39)
ATRIAL RATE: 81 BPM
BASOPHILS # BLD AUTO: 0.03 THOUSANDS/ÂΜL (ref 0–0.1)
BASOPHILS NFR BLD AUTO: 0 % (ref 0–1)
BILIRUB SERPL-MCNC: 0.77 MG/DL (ref 0.2–1)
BNP SERPL-MCNC: >4700 PG/ML (ref 0–100)
BUN SERPL-MCNC: 53 MG/DL (ref 5–25)
CALCIUM SERPL-MCNC: 8.5 MG/DL (ref 8.4–10.2)
CARDIAC TROPONIN I PNL SERPL HS: 125 NG/L
CARDIAC TROPONIN I PNL SERPL HS: 139 NG/L
CARDIAC TROPONIN I PNL SERPL HS: 145 NG/L
CHLORIDE SERPL-SCNC: 99 MMOL/L (ref 96–108)
CO2 SERPL-SCNC: 20 MMOL/L (ref 21–32)
CREAT SERPL-MCNC: 5.59 MG/DL (ref 0.6–1.3)
EOSINOPHIL # BLD AUTO: 0.01 THOUSAND/ÂΜL (ref 0–0.61)
EOSINOPHIL NFR BLD AUTO: 0 % (ref 0–6)
ERYTHROCYTE [DISTWIDTH] IN BLOOD BY AUTOMATED COUNT: 19.1 % (ref 11.6–15.1)
GFR SERPL CREATININE-BSD FRML MDRD: 10 ML/MIN/1.73SQ M
GLUCOSE SERPL-MCNC: 252 MG/DL (ref 65–140)
HCT VFR BLD AUTO: 35.9 % (ref 36.5–49.3)
HGB BLD-MCNC: 11 G/DL (ref 12–17)
IMM GRANULOCYTES # BLD AUTO: 0.03 THOUSAND/UL (ref 0–0.2)
IMM GRANULOCYTES NFR BLD AUTO: 0 % (ref 0–2)
LG PLATELETS BLD QL SMEAR: PRESENT
LYMPHOCYTES # BLD AUTO: 0.62 THOUSANDS/ÂΜL (ref 0.6–4.47)
LYMPHOCYTES NFR BLD AUTO: 7 % (ref 14–44)
MCH RBC QN AUTO: 32.2 PG (ref 26.8–34.3)
MCHC RBC AUTO-ENTMCNC: 30.6 G/DL (ref 31.4–37.4)
MCV RBC AUTO: 105 FL (ref 82–98)
MONOCYTES # BLD AUTO: 0.22 THOUSAND/ÂΜL (ref 0.17–1.22)
MONOCYTES NFR BLD AUTO: 2 % (ref 4–12)
NEUTROPHILS # BLD AUTO: 8.14 THOUSANDS/ÂΜL (ref 1.85–7.62)
NEUTS SEG NFR BLD AUTO: 91 % (ref 43–75)
NRBC BLD AUTO-RTO: 0 /100 WBCS
P AXIS: 49 DEGREES
PLATELET # BLD AUTO: 359 THOUSANDS/UL (ref 149–390)
PLATELET BLD QL SMEAR: ABNORMAL
PMV BLD AUTO: 9.2 FL (ref 8.9–12.7)
POTASSIUM SERPL-SCNC: 5.4 MMOL/L (ref 3.5–5.3)
PR INTERVAL: 156 MS
PROT SERPL-MCNC: 6.6 G/DL (ref 6.4–8.4)
QRS AXIS: 20 DEGREES
QRSD INTERVAL: 140 MS
QT INTERVAL: 504 MS
QTC INTERVAL: 585 MS
RBC # BLD AUTO: 3.42 MILLION/UL (ref 3.88–5.62)
RBC MORPH BLD: PRESENT
SODIUM SERPL-SCNC: 133 MMOL/L (ref 135–147)
T WAVE AXIS: 124 DEGREES
VENTRICULAR RATE: 81 BPM
WBC # BLD AUTO: 8.99 THOUSAND/UL (ref 4.31–10.16)

## 2024-02-02 PROCEDURE — 85025 COMPLETE CBC W/AUTO DIFF WBC: CPT | Performed by: EMERGENCY MEDICINE

## 2024-02-02 PROCEDURE — 99285 EMERGENCY DEPT VISIT HI MDM: CPT | Performed by: EMERGENCY MEDICINE

## 2024-02-02 PROCEDURE — 83880 ASSAY OF NATRIURETIC PEPTIDE: CPT | Performed by: EMERGENCY MEDICINE

## 2024-02-02 PROCEDURE — 80053 COMPREHEN METABOLIC PANEL: CPT | Performed by: EMERGENCY MEDICINE

## 2024-02-02 PROCEDURE — 93005 ELECTROCARDIOGRAM TRACING: CPT

## 2024-02-02 PROCEDURE — 84484 ASSAY OF TROPONIN QUANT: CPT | Performed by: EMERGENCY MEDICINE

## 2024-02-02 PROCEDURE — 99285 EMERGENCY DEPT VISIT HI MDM: CPT | Performed by: INTERNAL MEDICINE

## 2024-02-02 PROCEDURE — 99285 EMERGENCY DEPT VISIT HI MDM: CPT

## 2024-02-02 PROCEDURE — 71045 X-RAY EXAM CHEST 1 VIEW: CPT

## 2024-02-02 PROCEDURE — 36415 COLL VENOUS BLD VENIPUNCTURE: CPT | Performed by: EMERGENCY MEDICINE

## 2024-02-02 PROCEDURE — G0257 UNSCHED DIALYSIS ESRD PT HOS: HCPCS

## 2024-02-02 NOTE — ED NOTES
Pt resting comfortably, awaiting dialysis. Call bell within reach     Evangelina Oro RN  02/02/24 [id number]

## 2024-02-02 NOTE — PLAN OF CARE
Target UF Goal 2 L as tolerated. Patient dialyzing for 2.5 hours on 2 K bath for serum K of  5.4  per protocol. Treatment plan reviewed with Nephrology.       Post-Dialysis RN Treatment Note    Blood Pressure:  Pre 123/63 mm/Hg  Post 122/58 mmHg   EDW  TBD   Weight:  Pre 49.2 kg   Post 46.1 kg   Mode of weight measurement: Other Stretcher scale   Volume Removed  1659 ml    Treatment duration  2 hours and 10 minutes   NS given  No    Treatment shortened? Yes, describe: Tx ended 20 minutes early for hypotension, Patient became pale, and diaphorretic.   Medications given during Rx None Reported   Estimated Kt/V  1.29   Access type: AV fistula   Access Issues: No    Report called to primary nurse   Yes, Evangelina Oro RN          Problem: METABOLIC, FLUID AND ELECTROLYTES - ADULT  Goal: Electrolytes maintained within normal limits  Description: INTERVENTIONS:  - Monitor labs and assess patient for signs and symptoms of electrolyte imbalances  - Administer electrolyte replacement as ordered  - Monitor response to electrolyte replacements, including repeat lab results as appropriate  - Instruct patient on fluid and nutrition as appropriate  Outcome: Progressing  Goal: Fluid balance maintained  Description: INTERVENTIONS:  - Monitor labs   - Monitor I/O and WT  - Instruct patient on fluid and nutrition as appropriate  - Assess for signs & symptoms of volume excess or deficit  Outcome: Progressing

## 2024-02-02 NOTE — ED PROVIDER NOTES
"History  Chief Complaint   Patient presents with    Shortness of Breath     Patient states he started with SOB last night. Last had dialysis yesterday but states he feels like they didn't take enough fluid off.     53-year-old male history of end-stage renal disease on hemodialysis presenting with shortness of breath.  Patient underwent a normal hemodialysis session yesterday but states he still feels like he has extra volume.  Has complained of persistent edema in his left lower extremity and states he had to sleep sitting up last night as he was short of breath if he attempted to lie flat.  He has been home for 13 days since he was hospitalized at [facility] for multiple medical issues including GI bleed, NSTEMI, pneumonia.  Denies fevers or chills.  Slight cough.  Is not normally on supplemental oxygen at home.        Prior to Admission Medications   Prescriptions Last Dose Informant Patient Reported? Taking?   Glucagon, rDNA, (Glucagon Emergency) 1 MG KIT   No No   Sig: INJECT 1MG SUBCUTANEOUSLY ONCE AS NEEDED FOR LOW BLOOD SUGAR FOR UP TO 1 DOSE   Insulin Syringe-Needle U-100 31G X 15/64\" 0.5 ML MISC   No No   Sig: Use 3 (three) times a day   amiodarone 200 mg tablet   No No   Sig: Take 1 tablet (200 mg total) by mouth daily with breakfast Do not start before January 21, 2024.   apixaban (ELIQUIS) 2.5 mg   No No   Sig: Take 1 tablet (2.5 mg total) by mouth 2 (two) times a day   aspirin 81 mg chewable tablet  Self Yes No   Sig: Chew 81 mg every morning    atorvastatin (LIPITOR) 80 mg tablet   No No   Sig: Take 1 tablet (80 mg total) by mouth daily   calcitriol (ROCALTROL) 0.5 MCG capsule   No No   Sig: Take 1 capsule (0.5 mcg total) by mouth 3 (three) times a week   cinacalcet (SENSIPAR) 30 mg tablet   No No   Sig: Take 1 tablet (30 mg total) by mouth daily   clopidogrel (PLAVIX) 75 mg tablet   No No   Sig: Take 1 tablet (75 mg total) by mouth daily   glucose blood (Contour Next Test) test strip   No " No   Sig: Use as instructed 6 times daily   insulin aspart (NovoLOG) 100 units/mL injection   No No   Si Units by Subcutaneous Insulin Pump route 2 (two) times a day with meals   insulin glargine (Semglee) 100 units/mL subcutaneous injection   No No   Sig: Inject 10 Units under the skin every 12 (twelve) hours   midodrine (PROAMATINE) 10 MG tablet   No No   Sig: Take 1 tablet (10 mg total) by mouth 3 (three) times a day before meals   midodrine (PROAMATINE) 5 mg tablet   No No   Sig: Take 1 tablet (5 mg total) by mouth 3 (three) times a week Before dialysis   Patient taking differently: Take 5 mg by mouth 3 (three) times a week Before dialysis Tues th Sat   multivitamin (THERAGRAN) TABS   Yes No   Sig: Take 1 tablet by mouth daily   pantoprazole (PROTONIX) 40 mg tablet   No No   Sig: Take 1 tablet (40 mg total) by mouth 2 (two) times a day   predniSONE 5 mg tablet   No No   Sig: Take 1 tablet (5 mg total) by mouth daily   tacrolimus (PROGRAF) 1 mg capsule   No No   Si caps in the morning and 1 cap in the evening      Facility-Administered Medications: None       Past Medical History:   Diagnosis Date    Bacteremia 2018    CAD (coronary artery disease)     s/p MARC to LCx, pLAD 2018    Cardiac arrest (HCC)     Chronic kidney disease     Diabetes mellitus type 1 (HCC)     Dialysis patient (HCC)     Access in right chest    GI bleed     Hyperlipidemia     Hypertension     Infection at site of external fixator pin (HCC)     MI (myocardial infarction) (HCC)     Myocardial infarction (HCC)     Pneumonia     Last Assessed [id number]    Renal failure     Renal transplant, status post 2007       Past Surgical History:   Procedure Laterality Date    AMPUTATION Right 2019    aboce knee    ANKLE FRACTURE SURGERY      ANKLE HARDWARE REMOVAL Right 2017    Procedure: REMOVAL HARDWARE ANKLE;  Surgeon: Alvin Leon MD;  Location: [facility];  Service: Orthopedics    ANKLE HARDWARE REMOVAL Right  8/17/2017    Procedure: TIBIA FAILED HARDWARE REMOVAL;  Surgeon: Alvin Leon MD;  Location: [facility];  Service: Orthopedics    CARDIAC CATHETERIZATION Left 1/12/2024    Procedure: Cardiac Left Heart Cath;  Surgeon: Spencer Bloom MD;  Location:  CARDIAC CATH LAB;  Service: Cardiology    CARDIAC CATHETERIZATION N/A 1/18/2024    Procedure: Cardiac pci;  Surgeon: Spencer Bloom MD;  [facility]: [facility];  Service: Cardiology    CARDIAC CATHETERIZATION N/A 1/18/2024    Procedure: Cardiac Impella Insertion;  Surgeon: Spencer Bloom MD;  [facility]: [facility];  Service: Cardiology    CLOSED REDUCTION ANKLE Right 7/3/2017    Procedure: CLOSED REDUCTION DISTAL TIB-FIB AND CASTING VS;  Surgeon: Alvin Leon MD;  Location: [facility];  Service: Orthopedics    CORONARY ANGIOPLASTY WITH STENT PLACEMENT  02/2018    CAD s/p MARC to LCx, pLAD    ESOPHAGOGASTRODUODENOSCOPY N/A 12/20/2018    Procedure: ESOPHAGOGASTRODUODENOSCOPY (EGD) in ICU;  Surgeon: Galileo Wise IV, MD;  Location: [facility];  Service: Gastroenterology    EYE SURGERY      FRACTURE SURGERY      ORIF Rt Ankle    GLUTAMIC ACID DECARBOXYLASE (HISTORICAL)      IR AV FISTULAGRAM/GRAFTOGRAM  4/16/2020    IR AV FISTULAGRAM/GRAFTOGRAM  6/23/2023    IR PICC LINE  8/20/2018    IR TEMPORARY DIALYSIS CATHETER CHECK/CHANGE/REPOSITION  1/8/2024    IR TUNNELED DIALYSIS CATHETER CHECK/CHANGE/REPOSITION/ANGIOPLASTY  1/8/2020    IR TUNNELED DIALYSIS CATHETER PLACEMENT  10/21/2019    IR TUNNELED DIALYSIS CATHETER REMOVAL  5/29/2020    IR VENOUS LINE REMOVAL  10/5/2018    LEG AMPUTATION Right 02/01/2019    Above the knee    NEPHRECTOMY TRANSPLANTED ORGAN      NE ARTERIOVENOUS ANASTOMOSIS OPEN DIRECT Right 2/11/2020    Procedure: CREATION FISTULA ARTERIOVENOUS (AV) right upper extremity;  Surgeon: Carlos Medina MD;  Location:  MAIN OR;  Service: Vascular    NE OPEN TREATMENT FRACTURE DISTAL TIBIA FIBULA Right 7/3/2017    Procedure: OPEN REDUCTION W/ INTERNAL  FIXATION (ORIF);  Surgeon: Alvin Leon MD;  Location: [facility];  Service: Orthopedics    TOE AMPUTATION Right 10/27/2016    Procedure: AMPUTATION TOE;  Surgeon: Krishna Ruiz DPM;  Location: [facility];  Service:        Family History   Problem Relation Age of Onset    Diabetes Brother     Coronary artery disease Mother     No Known Problems Father      I have reviewed and agree with the history as documented.    E-Cigarette/Vaping    E-Cigarette Use Never User      E-Cigarette/Vaping Substances    Nicotine No     THC No     CBD No     Flavoring No     Other No     Unknown No      Social History     Tobacco Use    Smoking status: Never    Smokeless tobacco: Never   Vaping Use    Vaping status: Never Used   Substance Use Topics    Alcohol use: Not Currently     Alcohol/week: 0.0 standard drinks of alcohol    Drug use: Never       Review of Systems   Constitutional:  Negative for activity change, appetite change, chills and fever.   HENT:  Negative for ear pain, hearing loss, rhinorrhea, sneezing, sore throat and trouble swallowing.    Eyes:  Negative for pain and visual disturbance.   Respiratory:  Positive for cough and shortness of breath. Negative for choking, chest tightness, wheezing and stridor.    Cardiovascular:  Negative for chest pain, palpitations and leg swelling.   Gastrointestinal:  Negative for abdominal pain, constipation, diarrhea, nausea and vomiting.   Genitourinary:  Negative for difficulty urinating, dysuria, frequency, hematuria and testicular pain.   Musculoskeletal:  Negative for arthralgias, back pain, gait problem and neck pain.   Skin:  Negative for color change and rash.   Allergic/Immunologic: Negative for immunocompromised state.   Neurological:  Negative for dizziness, seizures, syncope, speech difficulty, weakness, light-headedness, numbness and headaches.   All other systems reviewed and are negative.      Physical Exam  Physical Exam  Vitals and nursing note reviewed.    Constitutional:       General: He is not in acute distress.     Appearance: Normal appearance. He is well-developed and normal weight. He is not ill-appearing, toxic-appearing or diaphoretic.   HENT:      Head: Normocephalic and atraumatic.      Nose: Nose normal.      Mouth/Throat:      Mouth: Mucous membranes are moist.      Pharynx: Oropharynx is clear.   Eyes:      General: No scleral icterus.     Extraocular Movements: Extraocular movements intact.      Conjunctiva/sclera: Conjunctivae normal.   Cardiovascular:      Rate and Rhythm: Normal rate and regular rhythm.      Pulses: Normal pulses.      Heart sounds: Normal heart sounds. No murmur heard.  Pulmonary:      Effort: Pulmonary effort is normal. No tachypnea, accessory muscle usage or respiratory distress.      Breath sounds: Examination of the right-lower field reveals rhonchi. Examination of the left-lower field reveals rhonchi. Rhonchi present. No decreased breath sounds, wheezing or rales.   Chest:      Chest wall: No tenderness.   Abdominal:      General: Bowel sounds are normal. There is no distension.      Palpations: Abdomen is soft. There is no mass.      Tenderness: There is no abdominal tenderness. There is no right CVA tenderness, left CVA tenderness, guarding or rebound.   Musculoskeletal:         General: No tenderness, deformity or signs of injury. Normal range of motion.      Cervical back: Normal range of motion and neck supple. No rigidity or tenderness.      Right lower leg: No tenderness. Edema present.      Comments: Right BKA present    Right upper extremity AV fistula with positive thrill and bruit   Lymphadenopathy:      Cervical: No cervical adenopathy.   Skin:     General: Skin is warm and dry.      Capillary Refill: Capillary refill takes less than 2 seconds.      Coloration: Skin is not jaundiced or pale.      Findings: No bruising, erythema, lesion or rash.   Neurological:      General: No focal deficit present.      Mental  Status: He is alert and oriented to person, place, and time. Mental status is at baseline.      Motor: No abnormal muscle tone.   Psychiatric:         Mood and Affect: Mood normal.         Behavior: Behavior normal.         Vital Signs  ED Triage Vitals   Temperature Pulse Respirations Blood Pressure SpO2   02/02/24 0653 02/02/24 0652 02/02/24 0652 02/02/24 0652 02/02/24 0652   (!) 97 °F (36.1 °C) 86 20 143/64 (!) 86 %      Temp Source Heart Rate Source Patient Position - Orthostatic VS BP Location FiO2 (%)   02/02/24 0653 02/02/24 0652 02/02/24 1100 02/02/24 1100 --   Tympanic Monitor Lying Right arm       Pain Score       --                  Vitals:    02/02/24 1745 02/02/24 1800 02/02/24 1805 02/02/24 1810   BP: 104/58 (!) 62/35 104/56 122/58   Pulse: 70 64 65 69   Patient Position - Orthostatic VS:    Lying         Visual Acuity      ED Medications  Medications - No data to display    Diagnostic Studies  Results Reviewed       Procedure Component Value Units Date/Time    HS Troponin I 4hr [[id number]]  (Abnormal) Collected: 02/02/24 1230    Lab Status: Final result Specimen: Blood from Arm, Left Updated: 02/02/24 1256     hs TnI 4hr 125 ng/L      Delta 4hr hsTnI -20 ng/L     HS Troponin I 2hr [[id number]]  (Abnormal) Collected: 02/02/24 0941    Lab Status: Final result Specimen: Blood from Arm, Left Updated: 02/02/24 1006     hs TnI 2hr 139 ng/L      Delta 2hr hsTnI -6 ng/L     B-Type Natriuretic Peptide(BNP) [[id number]]  (Abnormal) Collected: 02/02/24 0718    Lab Status: Final result Specimen: Blood from Arm, Left Updated: 02/02/24 0811     BNP >4,700 pg/mL     HS Troponin 0hr (reflex protocol) [[id number]]  (Abnormal) Collected: 02/02/24 0738    Lab Status: Final result Specimen: Blood from Arm, Left Updated: 02/02/24 0804     hs TnI 0hr 145 ng/L     CBC and differential [[id number]]  (Abnormal) Collected: 02/02/24 0718    Lab Status: Final result Specimen: Blood from Arm, Left Updated: 02/02/24 0748     WBC 8.99  Thousand/uL      RBC 3.42 Million/uL      Hemoglobin 11.0 g/dL      Hematocrit 35.9 %       fL      MCH 32.2 pg      MCHC 30.6 g/dL      RDW 19.1 %      MPV 9.2 fL      Platelets 359 Thousands/uL      nRBC 0 /100 WBCs      Neutrophils Relative 91 %      Immat GRANS % 0 %      Lymphocytes Relative 7 %      Monocytes Relative 2 %      Eosinophils Relative 0 %      Basophils Relative 0 %      Neutrophils Absolute 8.14 Thousands/µL      Immature Grans Absolute 0.03 Thousand/uL      Lymphocytes Absolute 0.62 Thousands/µL      Monocytes Absolute 0.22 Thousand/µL      Eosinophils Absolute 0.01 Thousand/µL      Basophils Absolute 0.03 Thousands/µL     Narrative:      This is an appended report.  These results have been appended to a previously verified report.    Smear Review(Phlebs Do Not Order) [[id number]]  (Abnormal) Collected: 02/02/24 0718    Lab Status: Final result Specimen: Blood from Arm, Left Updated: 02/02/24 0748     RBC Morphology Present     Platelet Estimate Increased     Large Platelet Present     Anisocytosis Present    Comprehensive metabolic panel [[id number]]  (Abnormal) Collected: 02/02/24 0718    Lab Status: Final result Specimen: Blood from Arm, Left Updated: 02/02/24 0738     Sodium 133 mmol/L      Potassium 5.4 mmol/L      Chloride 99 mmol/L      CO2 20 mmol/L      ANION GAP 14 mmol/L      BUN 53 mg/dL      Creatinine 5.59 mg/dL      Glucose 252 mg/dL      Calcium 8.5 mg/dL      AST 44 U/L      ALT 35 U/L      Alkaline Phosphatase 111 U/L      Total Protein 6.6 g/dL      Albumin 3.6 g/dL      Total Bilirubin 0.77 mg/dL      eGFR 10 ml/min/1.73sq m     Narrative:      National Kid[employer] Disease Foundation guidelines for Chronic Kidney Disease (CKD):     Stage 1 with normal or high GFR (GFR > 90 mL/min/1.73 square meters)    Stage 2 Mild CKD (GFR = 60-89 mL/min/1.73 square meters)    Stage 3A Moderate CKD (GFR = 45-59 mL/min/1.73 square meters)    Stage 3B Moderate CKD (GFR = 30-44 mL/min/1.73  square meters)    Stage 4 Severe CKD (GFR = 15-29 mL/min/1.73 square meters)    Stage 5 End Stage CKD (GFR <15 mL/min/1.73 square meters)  Note: GFR calculation is accurate only with a steady state creatinine                   X-ray chest 1 view portable   Final Result by Galileo Sow MD (02/02 0913)      Diffuse bilateral airspace opacities may be due to pulmonary edema versus multifocal pneumonia. Small bilateral pleural effusions.         Workstation performed: [id number]                    Procedures  ECG 12 Lead Documentation Only    Date/Time: 2/2/2024 7:20 AM    Performed by: Winston Barrios DO  Authorized by: Winston Barrios DO    Indications / Diagnosis:  SOB  ECG reviewed by me, the ED Provider: yes    Patient location:  ED  Rate:     ECG rate:  80    ECG rate assessment: normal    Rhythm:     Rhythm: sinus rhythm    Ectopy:     Ectopy: none    QRS:     QRS axis:  Left    QRS intervals:  Wide  Conduction:     Conduction: abnormal      Abnormal conduction: complete LBBB    ST segments:     ST segments:  Normal  T waves:     T waves: non-specific             ED Course           Discussed with Dr. Stevenson of nephrology.  Will have nurse availability this afternoon to administer 2.5-hour hemodialysis session in the emergency department and will be discharged home.  He is scheduled as well for his normally scheduled hemodialysis session tomorrow morning.                    SBIRT 20yo+      Flowsheet Row Most Recent Value   Initial Alcohol Screen: US AUDIT-C     1. How often do you have a drink containing alcohol? 0 Filed at: 02/02/2024 0706   2. How many drinks containing alcohol do you have on a typical day you are drinking?  0 Filed at: 02/02/2024 0706   3a. Male UNDER 65: How often do you have five or more drinks on one occasion? 0 Filed at: 02/02/2024 0706   3b. FEMALE Any Age, or MALE 65+: How often do you have 4 or more drinks on one occassion? 0 Filed at: 02/02/2024 0706    Audit-C Score 0 Filed at: 02/02/2024 0706   ADRIEL: How many times in the past year have you...    Used an illegal drug or used a prescription medication for non-medical reasons? Never Filed at: 02/02/2024 0706                      Medical Decision Making  53-year-old male with history of end-stage renal disease on hemodialysis presenting with shortness of breath and persistent edema in the left lower extremity.    Problems Addressed:  ESRD (end stage renal disease) (HCC): chronic illness or injury  Hypoxia: acute illness or injury     Details: Secondary to volume overload    Amount and/or Complexity of Data Reviewed  Independent Historian: spouse  External Data Reviewed: labs, radiology, ECG and notes.  Labs: ordered. Decision-making details documented in ED Course.  Radiology: ordered and independent interpretation performed. Decision-making details documented in ED Course.  ECG/medicine tests: ordered and independent interpretation performed. Decision-making details documented in ED Course.  Discussion of management or test interpretation with external provider(s): 6221 - UU sent to Dr. Diehl (Nephrology)    Risk  Prescription drug management.  Decision regarding hospitalization.  Risk Details: Patient discharged on room air.  No longer having episodes of hypoxia after hemodialysis session.  Patient scheduled for hemodialysis as well tomorrow morning.  Will discharge with family.  Remains hemodynamically stable.             Disposition  Final diagnoses:   ESRD (end stage renal disease) (HCC)   Hypoxia     Time reflects when diagnosis was documented in both MDM as applicable and the Disposition within this note       Time User Action Codes Description Comment    2/2/2024  9:14 AM Winston Barrios Add [N18.6] ESRD (end stage renal disease) (HCC)     2/2/2024  3:05 PM Winston Barrios Add [R09.02] Hypoxia           ED Disposition       ED Disposition   Discharge    Condition   Stable    Date/Time   Fri Feb  "2, 2024 3229    Comment   Berto Faria discharge to home/self care.                   Follow-up Information       Follow up With Specialties Details Why Contact Info    Dalton Anderson DO Family Medicine Schedule an appointment as soon as possible for a visit   [address] [facility] [address] [phone]              Discharge Medication List as of 2/2/2024  3:05 PM        CONTINUE these medications which have NOT CHANGED    Details   amiodarone 200 mg tablet Take 1 tablet (200 mg total) by mouth daily with breakfast Do not start before January 21, 2024., Starting Sun 1/21/2024, Normal      apixaban (ELIQUIS) 2.5 mg Take 1 tablet (2.5 mg total) by mouth 2 (two) times a day, Starting Sat 1/20/2024, Normal      aspirin 81 mg chewable tablet Chew 81 mg every morning , Historical Med      atorvastatin (LIPITOR) 80 mg tablet Take 1 tablet (80 mg total) by mouth daily, Starting Tue 10/24/2023, Normal      calcitriol (ROCALTROL) 0.5 MCG capsule Take 1 capsule (0.5 mcg total) by mouth 3 (three) times a week, Starting Tue 1/23/2024, Normal      cinacalcet (SENSIPAR) 30 mg tablet Take 1 tablet (30 mg total) by mouth daily, Starting Mon 3/7/2022, Normal      clopidogrel (PLAVIX) 75 mg tablet Take 1 tablet (75 mg total) by mouth daily, Starting Sun 1/21/2024, Normal      Glucagon, rDNA, (Glucagon Emergency) 1 MG KIT INJECT 1MG SUBCUTANEOUSLY ONCE AS NEEDED FOR LOW BLOOD SUGAR FOR UP TO 1 DOSE, Normal      glucose blood (Contour Next Test) test strip Use as instructed 6 times daily, Normal      insulin aspart (NovoLOG) 100 units/mL injection 8 Units by Subcutaneous Insulin Pump route 2 (two) times a day with meals, Starting Sat 1/20/2024, No Print      insulin glargine (Semglee) 100 units/mL subcutaneous injection Inject 10 Units under the skin every 12 (twelve) hours, Starting Fri 2/17/2023, Normal      Insulin Syringe-Needle U-100 31G X 15/64\" 0.5 ML MISC Use 3 (three) times a day, Starting Thu 3/10/2022, Normal "      !! midodrine (PROAMATINE) 10 MG tablet Take 1 tablet (10 mg total) by mouth 3 (three) times a day before meals, Starting Sat 1/20/2024, Normal      !! midodrine (PROAMATINE) 5 mg tablet Take 1 tablet (5 mg total) by mouth 3 (three) times a week Before dialysis, Starting Mon 1/22/2024, Normal      multivitamin (THERAGRAN) TABS Take 1 tablet by mouth daily, Historical Med      pantoprazole (PROTONIX) 40 mg tablet Take 1 tablet (40 mg total) by mouth 2 (two) times a day, Starting Sat 1/20/2024, Normal      predniSONE 5 mg tablet Take 1 tablet (5 mg total) by mouth daily, Starting Tue 10/24/2023, Normal      tacrolimus (PROGRAF) 1 mg capsule 2 caps in the morning and 1 cap in the evening, Normal       !! - Potential duplicate medications found. Please discuss with provider.          No discharge procedures on file.    PDMP Review       None            ED Provider  Electronically Signed by             Winston Barrios DO  02/02/24 2785

## 2024-02-02 NOTE — PROGRESS NOTES
Email escalation received from [facility]  stating patient with increased risk factor for recent hospitalization.     Patient referred to complex care management.  In basket sent to RN Care Manager Michaela HUTCHINSON regarding the same.

## 2024-02-02 NOTE — ED NOTES
Pt verbalized he feels good and is ready for discharge. Pt wheeled to car with wife. Discharge instructions reviewed with patient and wife, both verbalized understanding.      Evangelina Oro RN  02/02/24 3595

## 2024-02-02 NOTE — CONSULTS
CONSULTATION-NEPHROLOGY   Berto Faria 53 y.o. male MRN: [id number]  Unit/Bed#: RM11 Encounter: [id number]        Assessment and Plan:    ESRD on HD TTS at [facility] in [place]   Volume status: hypervolemic with edema LLE , hypoxia and CXR concerning for volume overload.EDW 47.5 kg. Bedscale weight on admission 47.7 kg.  Rn pre scale weight 49.2 kg and UF 1.6 L. Post weight 46.1 kg.    Recommended 2.5 hr HD to help with volume removal. UF 1.6 L and tx ended 20 min early due to hypotension.  Can resume HD 2/3 per outpatient if hypoxia /SOB improves.  Challenge EDW as patient tolerates.    2. Volume overload, acute on chronic systolic CHF  EDW 47.5 kg.   Bedweight may not be accurate, challenge as able to tolerate.    3. HTN renal disease  Follow BP trends closely with UF. DC if hemodynamics stable.    4. Acute on chronic respiratory failure with hypoxia.   Etiology 2/2 volume overload.   UF as able.    5. Renal transplant, failed in 2021.  Follows with [place] transplant team.  Continue tacrolimus, azathioprine, prednisone.  Continue per outpatient nephrologist.    6. Hyponatremia, corrects with BG to normal.  UF as able.   Lower serum sodium.    7. Hyperkalemia, mild due to transcellular shifts/ acidosis.  Will fix with HD.     HPI:    Berto Faria is a 53 y.o. male with hx ESRD on HD TTS at [facility] presents for evaluation of shortness of breath. He was admitted 1/8-1/20 at [facility] for NSTEMI, GI bleed with hemorrhagic shock and CHF. She had EGD,flec sigmoid, left heart cath, cardiac PCI and cardiac empella insertion while hospitalized. EF 32%. He reports significant volume overload at [place]. He required 5 days of HD last week for overload. He received his typical HD this week on Tuesday and Thursday without issue.  Target weight 47.5 kg. 1.2 L removed 2/1. He left below EDW at 47.3 kg on 2/1.  CXR here showed diffuse bilateral opacities either PNA vs pulmonary edema, small bilateral pleural  effusions.       Reason for Consult: ESRD    Review of Systems:  Shortness of breath   swelling  A complete 10-point review of systems was performed. Aside from what was mentioned in the HPI, it is otherwise negative.      Historical Information   Past Medical History:   Diagnosis Date    Bacteremia 12/21/2018    CAD (coronary artery disease)     s/p MARC to LCx, pLAD 2/2018    Cardiac arrest (HCC)     Chronic kidney disease     Diabetes mellitus type 1 ([facility])     Dialysis patient ([facility])     Access in right chest    GI bleed     Hyperlipidemia     Hypertension     Infection at site of external fixator pin (HCC)     MI (myocardial infarction) ([facility])     Myocardial infarction ([facility])     Pneumonia     Last Assessed [id number]    Renal failure     Renal transplant, status post 07/21/2007     Past Surgical History:   Procedure Laterality Date    AMPUTATION Right 02/2019    aboce knee    ANKLE FRACTURE SURGERY      ANKLE HARDWARE REMOVAL Right 7/31/2017    Procedure: REMOVAL HARDWARE ANKLE;  Surgeon: Alvin Leon MD;  Location: [facility];  Service: Orthopedics    ANKLE HARDWARE REMOVAL Right 8/17/2017    Procedure: TIBIA FAILED HARDWARE REMOVAL;  Surgeon: Alvin Leon MD;  Location: [facility];  Service: Orthopedics    CARDIAC CATHETERIZATION Left 1/12/2024    Procedure: Cardiac Left Heart Cath;  Surgeon: Spencer Bloom MD;  Location: [facility];  Service: Cardiology    CARDIAC CATHETERIZATION N/A 1/18/2024    Procedure: Cardiac pci;  Surgeon: Spencer Bloom MD;  [facility]: [facility];  Service: Cardiology    CARDIAC CATHETERIZATION N/A 1/18/2024    Procedure: Cardiac Impella Insertion;  Surgeon: Spencer Bloom MD;  [facility]: [facility];  Service: Cardiology    CLOSED REDUCTION ANKLE Right 7/3/2017    Procedure: CLOSED REDUCTION DISTAL TIB-FIB AND CASTING VS;  Surgeon: Alvin Leon MD;  Location: [facility];  Service: Orthopedics    CORONARY ANGIOPLASTY WITH STENT PLACEMENT  02/2018    CAD s/p  MARC to LCx, pLAD    ESOPHAGOGASTRODUODENOSCOPY N/A 12/20/2018    Procedure: ESOPHAGOGASTRODUODENOSCOPY (EGD) in ICU;  Surgeon: Galileo Wise IV, MD;  Location: [facility];  Service: Gastroenterology    EYE SURGERY      FRACTURE SURGERY      ORIF Rt Ankle    GLUTAMIC ACID DECARBOXYLASE (HISTORICAL)      IR AV FISTULAGRAM/GRAFTOGRAM  4/16/2020    IR AV FISTULAGRAM/GRAFTOGRAM  6/23/2023    IR PICC LINE  8/20/2018    IR TEMPORARY DIALYSIS CATHETER CHECK/CHANGE/REPOSITION  1/8/2024    IR TUNNELED DIALYSIS CATHETER CHECK/CHANGE/REPOSITION/ANGIOPLASTY  1/8/2020    IR TUNNELED DIALYSIS CATHETER PLACEMENT  10/21/2019    IR TUNNELED DIALYSIS CATHETER REMOVAL  5/29/2020    IR VENOUS LINE REMOVAL  10/5/2018    LEG AMPUTATION Right 02/01/2019    Above the knee    NEPHRECTOMY TRANSPLANTED ORGAN      MT ARTERIOVENOUS ANASTOMOSIS OPEN DIRECT Right 2/11/2020    Procedure: CREATION FISTULA ARTERIOVENOUS (AV) right upper extremity;  Surgeon: Carlos Medina MD;  Location:  MAIN OR;  Service: Vascular    MT OPEN TREATMENT FRACTURE DISTAL TIBIA FIBULA Right 7/3/2017    Procedure: OPEN REDUCTION W/ INTERNAL FIXATION (ORIF);  Surgeon: Alvin Leon MD;  Location: [facility];  Service: Orthopedics    TOE AMPUTATION Right 10/27/2016    Procedure: AMPUTATION TOE;  Surgeon: Krishna Ruiz DPM;  Location: [facility];  Service:      Social History   Social History     Substance and Sexual Activity   Alcohol Use Not Currently    Alcohol/week: 0.0 standard drinks of alcohol     Social History     Substance and Sexual Activity   Drug Use Never     Social History     Tobacco Use   Smoking Status Never   Smokeless Tobacco Never       Family History:   Family History   Problem Relation Age of Onset    Diabetes Brother     Coronary artery disease Mother     No Known Problems Father        Medications:  Pertinent medications were reviewed        No Known Allergies      Vitals:   /58 (BP Location: Left arm)   Pulse 69   Temp (!) 97 °F (36.1 °C)  "(Tympanic)   Resp (!) 28   Wt 47.7 kg (105 lb 2.6 oz)   SpO2 98%   BMI 18.05 kg/m²   Body mass index is 18.05 kg/m².  SpO2: 98 %,   SpO2 Activity: At Rest,   O2 Device: Nasal cannula      Intake/Output Summary (Last 24 hours) at 2/2/2024 1816  Last data filed at 2/2/2024 1805  Gross per 24 hour   Intake 500 ml   Output 2159 ml   Net -1659 ml     Invasive Devices       Peripheral Intravenous Line  Duration             Peripheral IV 02/02/24 Left Antecubital <1 day              Line  Duration             Hemodialysis AV Fistula Right Upper arm -- days                    Physical Exam:  General: conscious, cooperative, in no acute distress  Eyes: conjunctivae pink, anicteric sclerae  ENT: lips and mucous membranes moist  Neck: supple, no JVD, no masses  Chest: decrease BS bilaterally, no wheezes, rales  CVS: distinct S1 & S2, normal rate, regular rhythm left leg edema 2+, Right AVF+t/b  Abdomen: soft, non-tender, non-distended, normoactive bowel sounds  Extremities: left leg edema +2, right leg amputation   Skin: no rash  Neuro: awake, alert, oriented      Diagnostic Data:  Lab: I have personally reviewed pertinent lab results.,   CBC:  Results from last 7 days   Lab Units 02/02/24  0718   WBC Thousand/uL 8.99   HEMOGLOBIN g/dL 11.0*   HEMATOCRIT % 35.9*   PLATELETS Thousands/uL 359      CMP:   Lab Results   Component Value Date    SODIUM 133 (L) 02/02/2024    K 5.4 (H) 02/02/2024    CL 99 02/02/2024    CO2 20 (L) 02/02/2024    BUN 53 (H) 02/02/2024    CREATININE 5.59 (H) 02/02/2024    CALCIUM 8.5 02/02/2024    AST 44 (H) 02/02/2024    ALT 35 02/02/2024    ALKPHOS 111 (H) 02/02/2024    EGFR 10 02/02/2024   ,   PT/INR: No results found for: \"PT\", \"INR\",   Magnesium: No components found for: \"MAG\",  Phosphorous: No results found for: \"PHOS\"    Microbiology:  @LABRCNTIP,(urinecx:7)@        Serena Parham DO    Portions of the record may have been created with voice recognition software. Occasional wrong word or " "\"sound a like\" substitutions may have occurred due to the inherent limitations of voice recognition software. Read the chart carefully and recognize, using context, where substitutions have occurred.      "

## 2024-02-05 ENCOUNTER — PATIENT OUTREACH (OUTPATIENT)
Dept: FAMILY MEDICINE CLINIC | Facility: CLINIC | Age: 54
End: 2024-02-05

## 2024-02-05 ENCOUNTER — VBI (OUTPATIENT)
Dept: FAMILY MEDICINE CLINIC | Facility: CLINIC | Age: 54
End: 2024-02-05

## 2024-02-05 ENCOUNTER — TELEPHONE (OUTPATIENT)
Dept: INTERVENTIONAL RADIOLOGY/VASCULAR | Facility: HOSPITAL | Age: 54
End: 2024-02-05

## 2024-02-05 NOTE — TELEPHONE ENCOUNTER
02/05/24 10:30 AM    Patient contacted post ED visit, [facility] department spoke with patient/caregiver and outreach was successful.    Thank you.  Lisandra Blair [facility]

## 2024-02-05 NOTE — PROGRESS NOTES
Attempted to contact patient to go over pre-procedure instructions for his fistulagram on 2/12/24 at the [facility], no answer message was left. Plan to arrive for 0930, have a ride to and from, and NPO after midnight the night prior. Left call back number for questions.

## 2024-02-09 ENCOUNTER — TELEPHONE (OUTPATIENT)
Dept: INTERVENTIONAL RADIOLOGY/VASCULAR | Facility: HOSPITAL | Age: 54
End: 2024-02-09

## 2024-02-12 ENCOUNTER — HOSPITAL ENCOUNTER (EMERGENCY)
Facility: HOSPITAL | Age: 54
Discharge: HOME | End: 2024-02-12
Attending: FAMILY MEDICINE | Admitting: FAMILY MEDICINE
Payer: MEDICARE

## 2024-02-12 ENCOUNTER — HOSPITAL ENCOUNTER (OUTPATIENT)
Dept: INTERVENTIONAL RADIOLOGY/VASCULAR | Facility: HOSPITAL | Age: 54
Discharge: HOME | End: 2024-02-12
Attending: STUDENT IN AN ORGANIZED HEALTH CARE EDUCATION/TRAINING PROGRAM
Payer: MEDICARE

## 2024-02-12 ENCOUNTER — PATIENT OUTREACH (OUTPATIENT)
Dept: FAMILY MEDICINE CLINIC | Facility: CLINIC | Age: 54
End: 2024-02-12

## 2024-02-12 VITALS
OXYGEN SATURATION: 98 % | RESPIRATION RATE: 20 BRPM | TEMPERATURE: 98.9 F | HEART RATE: 86 BPM | SYSTOLIC BLOOD PRESSURE: 154 MMHG | DIASTOLIC BLOOD PRESSURE: 65 MMHG

## 2024-02-12 VITALS
TEMPERATURE: 98.5 F | HEART RATE: 78 BPM | BODY MASS INDEX: 17.93 KG/M2 | SYSTOLIC BLOOD PRESSURE: 128 MMHG | HEIGHT: 64 IN | OXYGEN SATURATION: 95 % | WEIGHT: 105 LBS | RESPIRATION RATE: 16 BRPM | DIASTOLIC BLOOD PRESSURE: 60 MMHG

## 2024-02-12 DIAGNOSIS — Z99.2 ESRD (END STAGE RENAL DISEASE) ON DIALYSIS (HCC): ICD-10-CM

## 2024-02-12 DIAGNOSIS — N18.6 ESRD (END STAGE RENAL DISEASE) ON DIALYSIS (HCC): ICD-10-CM

## 2024-02-12 DIAGNOSIS — R06.02 SOB (SHORTNESS OF BREATH): Primary | ICD-10-CM

## 2024-02-12 LAB
GLUCOSE SERPL-MCNC: 66 MG/DL (ref 65–140)
GLUCOSE SERPL-MCNC: 71 MG/DL (ref 65–140)

## 2024-02-12 PROCEDURE — 99152 MOD SED SAME PHYS/QHP 5/>YRS: CPT | Performed by: STUDENT IN AN ORGANIZED HEALTH CARE EDUCATION/TRAINING PROGRAM

## 2024-02-12 PROCEDURE — 76937 US GUIDE VASCULAR ACCESS: CPT | Performed by: STUDENT IN AN ORGANIZED HEALTH CARE EDUCATION/TRAINING PROGRAM

## 2024-02-12 PROCEDURE — 99152 MOD SED SAME PHYS/QHP 5/>YRS: CPT

## 2024-02-12 PROCEDURE — 99153 MOD SED SAME PHYS/QHP EA: CPT

## 2024-02-12 PROCEDURE — 82948 REAGENT STRIP/BLOOD GLUCOSE: CPT

## 2024-02-12 PROCEDURE — C1769 GUIDE WIRE: HCPCS

## 2024-02-12 PROCEDURE — C1725 CATH, TRANSLUMIN NON-LASER: HCPCS

## 2024-02-12 PROCEDURE — 99285 EMERGENCY DEPT VISIT HI MDM: CPT

## 2024-02-12 PROCEDURE — 99284 EMERGENCY DEPT VISIT MOD MDM: CPT

## 2024-02-12 PROCEDURE — 36902 INTRO CATH DIALYSIS CIRCUIT: CPT | Performed by: STUDENT IN AN ORGANIZED HEALTH CARE EDUCATION/TRAINING PROGRAM

## 2024-02-12 PROCEDURE — C1894 INTRO/SHEATH, NON-LASER: HCPCS

## 2024-02-12 PROCEDURE — 36902 INTRO CATH DIALYSIS CIRCUIT: CPT

## 2024-02-12 RX ORDER — LIDOCAINE HYDROCHLORIDE 10 MG/ML
INJECTION, SOLUTION EPIDURAL; INFILTRATION; INTRACAUDAL; PERINEURAL AS NEEDED
Status: COMPLETED | OUTPATIENT
Start: 2024-02-12 | End: 2024-02-12

## 2024-02-12 RX ORDER — MIDAZOLAM HYDROCHLORIDE 2 MG/2ML
INJECTION, SOLUTION INTRAMUSCULAR; INTRAVENOUS AS NEEDED
Status: COMPLETED | OUTPATIENT
Start: 2024-02-12 | End: 2024-02-12

## 2024-02-12 RX ORDER — FENTANYL CITRATE 50 UG/ML
INJECTION, SOLUTION INTRAMUSCULAR; INTRAVENOUS AS NEEDED
Status: COMPLETED | OUTPATIENT
Start: 2024-02-12 | End: 2024-02-12

## 2024-02-12 RX ADMIN — IOHEXOL 70 ML: 350 INJECTION, SOLUTION INTRAVENOUS at 11:55

## 2024-02-12 RX ADMIN — MIDAZOLAM 1 MG: 1 INJECTION INTRAMUSCULAR; INTRAVENOUS at 11:42

## 2024-02-12 RX ADMIN — LIDOCAINE HYDROCHLORIDE 10 ML: 10 INJECTION, SOLUTION EPIDURAL; INFILTRATION; INTRACAUDAL; PERINEURAL at 11:40

## 2024-02-12 RX ADMIN — FENTANYL CITRATE 50 MCG: 50 INJECTION, SOLUTION INTRAMUSCULAR; INTRAVENOUS at 11:42

## 2024-02-12 NOTE — DISCHARGE INSTRUCTIONS
Return to the emergency room immediately if there are any new or worsening symptoms or if the symptoms are lasting longer than expected.     Proceed with your fistulogram scheduled for today.

## 2024-02-12 NOTE — H&P
Interventional Radiology Preprocedure Note    History/Indication for procedure:   Berto Faria is a 53 y.o. male with a PMH of ESRD on HD dialyzed through a [facility] who presents for fistulagraphy for the indication of HVP.    The last intervention was June 2023.    Relevant past medical history:    Past Medical History:   Diagnosis Date    Bacteremia 12/21/2018    CAD (coronary artery disease)     s/p MARC to LCx, pLAD 2/2018    Cardiac arrest ([facility])     Chronic kidney disease     Diabetes mellitus type 1 ([facility])     Dialysis patient ([facility])     Access in right chest    GI bleed     Hyperlipidemia     Hypertension     Infection at site of external fixator pin ([facility])     MI (myocardial infarction) ([facility])     Myocardial infarction ([facility])     Pneumonia     Last Assessed [id number]    Renal failure     Renal transplant, status post 07/21/2007     Patient Active Problem List   Diagnosis    Type 1 diabetes mellitus on insulin therapy ([facility])    Renal transplant, status post    Hyperkalemia    Osteomyelitis ([facility])    Poor circulation    Closed fracture of distal end of right tibia with routine healing    Chronic osteomyelitis of tibia ([facility])    Immunosuppression ([facility])    Hypertension    Elevated troponin    Diarrhea    Cellulitis of ankle    NSTEMI (non-ST elevated myocardial infarction) ([facility])    Urinary retention    Severe sepsis ([facility])    Hyperphosphatemia    Hyponatremia    GI bleed w/ Hemorrhagic Shock    S/P AKA (above knee amputation), right ([facility])    Acute blood loss anemia    Pulmonary hypertension ([facility])    Acute on chronic anemia    Depressed left ventricular ejection fraction    Acute pulmonary edema ([facility])    ESRD (end stage renal disease) ([facility])    Coronary artery disease involving native coronary artery    Pre-operative cardiovascular examination    Chronic kidney disease, unspecified    Pancreatic lesion    Abnormal CT scan, colon    Community acquired pneumonia of right lower lobe of lung    Multifocal pneumonia     "Elevated MCV    Hyperbilirubinemia    Acute on chronic diastolic CHF (congestive heart failure) (HCC)    Elevated procalcitonin    Mitral valve insufficiency    Abnormal EKG    Vasovagal syncope    Acute on chronic diastolic (congestive) heart failure (HCC)    Hx of AKA (above knee amputation) (HCC)    Renal transplant failure and rejection    New onset a-fib (HCC)    S/P pancreatic islet cell transplantation (HCC)       /71   Pulse 79   Temp 98.5 °F (36.9 °C) (Temporal)   Resp 16   Ht 5' 4\" (1.626 m)   Wt 47.6 kg (105 lb)   SpO2 98%   BMI 18.02 kg/m²     Medications:    Inpatient Medications:     Scheduled Medications:      Infusions:  No current facility-administered medications for this encounter.      PRN:      Outpatient Medications:  Current Outpatient Medications on File Prior to Encounter   Medication Sig Dispense Refill    amiodarone 200 mg tablet Take 1 tablet (200 mg total) by mouth daily with breakfast Do not start before January 21, 2024. 30 tablet 0    apixaban (ELIQUIS) 2.5 mg Take 1 tablet (2.5 mg total) by mouth 2 (two) times a day 60 tablet 0    aspirin 81 mg chewable tablet Chew 81 mg every morning       atorvastatin (LIPITOR) 80 mg tablet Take 1 tablet (80 mg total) by mouth daily 90 tablet 3    cinacalcet (SENSIPAR) 30 mg tablet Take 1 tablet (30 mg total) by mouth daily 90 tablet 3    clopidogrel (PLAVIX) 75 mg tablet Take 1 tablet (75 mg total) by mouth daily 30 tablet 0    insulin aspart (NovoLOG) 100 units/mL injection 8 Units by Subcutaneous Insulin Pump route 2 (two) times a day with meals      insulin glargine (Semglee) 100 units/mL subcutaneous injection Inject 10 Units under the skin every 12 (twelve) hours 20 mL 3    midodrine (PROAMATINE) 10 MG tablet Take 1 tablet (10 mg total) by mouth 3 (three) times a day before meals 90 tablet 0    multivitamin (THERAGRAN) TABS Take 1 tablet by mouth daily      pantoprazole (PROTONIX) 40 mg tablet Take 1 tablet (40 mg total) by mouth " "2 (two) times a day 60 tablet 0    predniSONE 5 mg tablet Take 1 tablet (5 mg total) by mouth daily 90 tablet 3    tacrolimus (PROGRAF) 1 mg capsule 2 caps in the morning and 1 cap in the evening 270 capsule 3    calcitriol (ROCALTROL) 0.5 MCG capsule Take 1 capsule (0.5 mcg total) by mouth 3 (three) times a week 12 capsule 0    Glucagon, rDNA, (Glucagon Emergency) 1 MG KIT INJECT 1MG SUBCUTANEOUSLY ONCE AS NEEDED FOR LOW BLOOD SUGAR FOR UP TO 1 DOSE 2 kit 0    glucose blood (Contour Next Test) test strip Use as instructed 6 times daily 600 strip 5    Insulin Syringe-Needle U-100 31G X 15/64\" 0.5 ML MISC Use 3 (three) times a day 100 each 4    midodrine (PROAMATINE) 5 mg tablet Take 1 tablet (5 mg total) by mouth 3 (three) times a week Before dialysis (Patient taking differently: Take 5 mg by mouth 3 (three) times a week Before dialysis Tues thurs Sat) 12 tablet 0     No current facility-administered medications on file prior to encounter.       No Known Allergies    Anticoagulants: eliquis and aspirin    ASA classification: ASA 3 - Patient with moderate systemic disease with functional limitations    Airway Assessment: II (hard and soft palate, upper portion of tonsils anduvula visible)    Relevant family history: None    Relevant review of systems: None    Prior sedation/anesthesia: yes    Can the patient lie flat? Yes     NPO Status: yes    Labs:   CBC with diff:   Lab Results   Component Value Date    WBC 8.99 02/02/2024    HGB 11.0 (L) 02/02/2024    HCT 35.9 (L) 02/02/2024     (H) 02/02/2024     02/02/2024    RBC 3.42 (L) 02/02/2024    MCH 32.2 02/02/2024    MCHC 30.6 (L) 02/02/2024    RDW 19.1 (H) 02/02/2024    MPV 9.2 02/02/2024    NRBC 0 02/02/2024     BMP/CMP:  Lab Results   Component Value Date     11/06/2015    K 5.4 (H) 02/02/2024    K 5.2 02/05/2019    CL 99 02/02/2024     11/06/2015    CO2 20 (L) 02/02/2024    CO2 13 (L) 02/14/2018    ANIONGAP 11 11/06/2015    BUN 53 (H) " 02/02/2024    BUN 31 (H) 11/06/2015    CREATININE 5.59 (H) 02/02/2024    CREATININE 4.54 (H) 12/16/2019    GLUCOSE 460 (H) 02/14/2018    GLUCOSE 111 11/06/2015    CALCIUM 8.5 02/02/2024    CALCIUM 8.8 11/06/2015    AST 44 (H) 02/02/2024    AST 12 11/06/2015    ALT 35 02/02/2024    ALT 34 11/06/2015    ALKPHOS 111 (H) 02/02/2024    ALKPHOS 164 (H) 11/06/2015    PROT 6.8 11/06/2015    BILITOT 0.45 11/06/2015    EGFR 10 02/02/2024    EGFR 38 02/14/2018   ,     Coags:   Lab Results   Component Value Date    PTT 87 (H) 01/18/2024    INR 1.23 (H) 01/12/2024   ,          Relevant imaging studies:   Reviewed.    Directed physical examination:  CONSTITUTIONAL: The patient appeared well in no acute distress.   NEUROLOGICAL: alert, awake, answering questions appropriately.  PSYCHIATRIC: Affect normal.  PULMONARY: No respiratory distress.  CARDIAC: normal sinus rhythm on monitor, without tachycardia.  GASTROINTESTINAL: abdomen was soft, round, nontender.  EXTREMITIES: No cyanosis.  [facility] has pulsatile thrill. Mild cannulation zone aneurysms.    Assessment/Plan:   For diagnostic fistulagram and possible treatment.    Sedation/Anesthesia plan:  Moderate sedation will be used as needed for procedure.    Consent with alternatives to the procedure, risks and benefits have been explained and discussed with the patient/patient's family: yes.    During the informed consent process, the following was discussed, and the patient was educated concerning paclitaxel coated balloons and stents, which may be appropriate for the patient's up-coming fistulagram procedure: Analysis of randomized trials suggest a possible increased death rate after two years in patients treated with paclitaxel-coated balloons and paclitaxel-eluting stents compared to patients treated with control devices (non-coated balloons or bare metal stents) specifically in patients with lower extremity claudication. This has not been corroborated for dialysis access  circuits.  The specific cause for this observation is yet to be determined. Currently, the FDA believes that the benefits continue to outweigh the risks for approved paclitaxel-coated balloons and paclitaxel-eluting stents when used in accordance with their indications for use. The patient gave informed consent for the use of these devices if appropriately indicated for treatment.

## 2024-02-12 NOTE — ED PROVIDER NOTES
"History  Chief Complaint   Patient presents with    Shortness of Breath     Mild shortness of breath., onset this morning. Patient states he had a nose bleed last night and thinks that's why he is short of breath       Patient is a 53-year-old male with relevant past medical history of CAD, cardiac arrest, CKD, diabetes mellitus, dialysis patient, GI bleed, hyperlipidemia, hypertension, myocardial infarction, renal failure, and status post renal transplant presenting with shortness of breath x 1 day.  Patient reports he has a fistulogram scheduled for 10:30 AM this morning. He reports that he had a nosebleed start last evening and he thinks it might of caused his mild shortness of breath this morning. He got the nosebleed under control. He called interventional radiology at 7:15 AM this morning informing them that his SpO2 was 87-88% and asked if he should go to dialysis or proceed to this facility for his scheduled fistulogram. He thinks it might have been a bad pulse ox reading. He receives dialysis Tuesday/Thursday/Saturday. He reports he was fluid overloaded about a month ago but is no longer fluid overloaded as his shoes fit loosely and he has no swelling. He denies headache, dizziness, numbness, tingling, chest pain, shortness of breath, abdominal pain, or any other complaints.      History provided by:  Patient  Shortness of Breath  Associated symptoms: no abdominal pain, no chest pain, no cough, no ear pain, no fever, no headaches, no rash, no sore throat and no vomiting        Prior to Admission Medications   Prescriptions Last Dose Informant Patient Reported? Taking?   Glucagon, rDNA, (Glucagon Emergency) 1 MG KIT   No No   Sig: INJECT 1MG SUBCUTANEOUSLY ONCE AS NEEDED FOR LOW BLOOD SUGAR FOR UP TO 1 DOSE   Insulin Syringe-Needle U-100 31G X 15/64\" 0.5 ML MISC   No No   Sig: Use 3 (three) times a day   amiodarone 200 mg tablet   No No   Sig: Take 1 tablet (200 mg total) by mouth daily with breakfast Do not " start before 2024.   apixaban (ELIQUIS) 2.5 mg   No No   Sig: Take 1 tablet (2.5 mg total) by mouth 2 (two) times a day   aspirin 81 mg chewable tablet  Self Yes No   Sig: Chew 81 mg every morning    atorvastatin (LIPITOR) 80 mg tablet   No No   Sig: Take 1 tablet (80 mg total) by mouth daily   calcitriol (ROCALTROL) 0.5 MCG capsule   No No   Sig: Take 1 capsule (0.5 mcg total) by mouth 3 (three) times a week   cinacalcet (SENSIPAR) 30 mg tablet   No No   Sig: Take 1 tablet (30 mg total) by mouth daily   clopidogrel (PLAVIX) 75 mg tablet   No No   Sig: Take 1 tablet (75 mg total) by mouth daily   glucose blood (Contour Next Test) test strip   No No   Sig: Use as instructed 6 times daily   insulin aspart (NovoLOG) 100 units/mL injection   No No   Si Units by Subcutaneous Insulin Pump route 2 (two) times a day with meals   insulin glargine (Semglee) 100 units/mL subcutaneous injection   No No   Sig: Inject 10 Units under the skin every 12 (twelve) hours   midodrine (PROAMATINE) 10 MG tablet   No No   Sig: Take 1 tablet (10 mg total) by mouth 3 (three) times a day before meals   midodrine (PROAMATINE) 5 mg tablet   No No   Sig: Take 1 tablet (5 mg total) by mouth 3 (three) times a week Before dialysis   Patient taking differently: Take 5 mg by mouth 3 (three) times a week Before dialysis Tues th Sat   multivitamin (THERAGRAN) TABS   Yes No   Sig: Take 1 tablet by mouth daily   pantoprazole (PROTONIX) 40 mg tablet   No No   Sig: Take 1 tablet (40 mg total) by mouth 2 (two) times a day   predniSONE 5 mg tablet   No No   Sig: Take 1 tablet (5 mg total) by mouth daily   tacrolimus (PROGRAF) 1 mg capsule   No No   Si caps in the morning and 1 cap in the evening      Facility-Administered Medications: None       Past Medical History:   Diagnosis Date    Bacteremia 2018    CAD (coronary artery disease)     s/p MARC to LCx, pLAD 2018    Cardiac arrest (HCC)     Chronic kidney disease     Diabetes  mellitus type 1 (HCC)     Dialysis patient (HCC)     Access in right chest    GI bleed     Hyperlipidemia     Hypertension     Infection at site of external fixator pin (HCC)     MI (myocardial infarction) (HCC)     Myocardial infarction (HCC)     Pneumonia     Last Assessed [id number]    Renal failure     Renal transplant, status post 07/21/2007       Past Surgical History:   Procedure Laterality Date    AMPUTATION Right 02/2019    aboce knee    ANKLE FRACTURE SURGERY      ANKLE HARDWARE REMOVAL Right 7/31/2017    Procedure: REMOVAL HARDWARE ANKLE;  Surgeon: Alvin Leon MD;  Location: [facility];  Service: Orthopedics    ANKLE HARDWARE REMOVAL Right 8/17/2017    Procedure: TIBIA FAILED HARDWARE REMOVAL;  Surgeon: Alvin Leon MD;  Location: [facility];  Service: Orthopedics    CARDIAC CATHETERIZATION Left 1/12/2024    Procedure: Cardiac Left Heart Cath;  Surgeon: Spencer Bloom MD;  [facility];  Service: Cardiology    CARDIAC CATHETERIZATION N/A 1/18/2024    Procedure: Cardiac pci;  Surgeon: Spencer Bloom MD;  [facility];  Service: Cardiology    CARDIAC CATHETERIZATION N/A 1/18/2024    Procedure: Cardiac Impella Insertion;  Surgeon: Spencer Bloom MD;  [facility];  Service: Cardiology    CLOSED REDUCTION ANKLE Right 7/3/2017    Procedure: CLOSED REDUCTION DISTAL TIB-FIB AND CASTING VS;  Surgeon: Alvin Leon MD;  [facility]: [facility];  Service: Orthopedics    CORONARY ANGIOPLASTY WITH STENT PLACEMENT  02/2018    CAD s/p MARC to LCx, pLAD    ESOPHAGOGASTRODUODENOSCOPY N/A 12/20/2018    Procedure: ESOPHAGOGASTRODUODENOSCOPY (EGD) in ICU;  Surgeon: Galileo Wise IV, MD;  Location: [facility];  Service: Gastroenterology    EYE SURGERY      FRACTURE SURGERY      ORIF Rt Ankle    GLUTAMIC ACID DECARBOXYLASE (HISTORICAL)      IR AV FISTULAGRAM/GRAFTOGRAM  4/16/2020    IR AV FISTULAGRAM/GRAFTOGRAM  6/23/2023    IR PICC LINE  8/20/2018    IR TEMPORARY DIALYSIS  CATHETER CHECK/CHANGE/REPOSITION  1/8/2024    IR TUNNELED DIALYSIS CATHETER CHECK/CHANGE/REPOSITION/ANGIOPLASTY  1/8/2020    IR TUNNELED DIALYSIS CATHETER PLACEMENT  10/21/2019    IR TUNNELED DIALYSIS CATHETER REMOVAL  5/29/2020    IR VENOUS LINE REMOVAL  10/5/2018    LEG AMPUTATION Right 02/01/2019    Above the knee    NEPHRECTOMY TRANSPLANTED ORGAN      TN ARTERIOVENOUS ANASTOMOSIS OPEN DIRECT Right 2/11/2020    Procedure: CREATION FISTULA ARTERIOVENOUS (AV) right upper extremity;  Surgeon: Carlos Medina MD;  Location:  MAIN OR;  Service: Vascular    TN OPEN TREATMENT FRACTURE DISTAL TIBIA FIBULA Right 7/3/2017    Procedure: OPEN REDUCTION W/ INTERNAL FIXATION (ORIF);  Surgeon: Alvin Leon MD;  [facility]: [facility];  Service: Orthopedics    TOE AMPUTATION Right 10/27/2016    Procedure: AMPUTATION TOE;  Surgeon: Krishna Ruiz DPM;  Location: [facility];  Service:        Family History   Problem Relation Age of Onset    Diabetes Brother     Coronary artery disease Mother     No Known Problems Father      I have reviewed and agree with the history as documented.    E-Cigarette/Vaping    E-Cigarette Use Never User      E-Cigarette/Vaping Substances    Nicotine No     THC No     CBD No     Flavoring No     Other No     Unknown No      Social History     Tobacco Use    Smoking status: Never    Smokeless tobacco: Never   Vaping Use    Vaping status: Never Used   Substance Use Topics    Alcohol use: Not Currently     Alcohol/week: 0.0 standard drinks of alcohol    Drug use: Never       Review of Systems   Constitutional:  Negative for chills and fever.   HENT:  Negative for congestion, ear pain and sore throat.    Eyes:  Negative for pain and visual disturbance.   Respiratory:  Negative for cough, chest tightness and shortness of breath.    Cardiovascular:  Negative for chest pain and palpitations.   Gastrointestinal:  Negative for abdominal pain, constipation, diarrhea, nausea and vomiting.   Genitourinary:  Negative  for dysuria and hematuria.   Musculoskeletal:  Negative for arthralgias and back pain.   Skin:  Negative for color change and rash.   Neurological:  Negative for dizziness, seizures, syncope, weakness and headaches.   All other systems reviewed and are negative.      Physical Exam  Physical Exam  Vitals and nursing note reviewed.   Constitutional:       General: He is not in acute distress.     Appearance: Normal appearance. He is well-developed. He is not ill-appearing or diaphoretic.   HENT:      Head: Normocephalic and atraumatic.   Eyes:      Conjunctiva/sclera: Conjunctivae normal.   Cardiovascular:      Rate and Rhythm: Normal rate and regular rhythm.      Heart sounds: No murmur heard.  Pulmonary:      Effort: Pulmonary effort is normal. No respiratory distress.      Breath sounds: Normal breath sounds. No stridor. No wheezing, rhonchi or rales.   Abdominal:      General: Abdomen is flat.      Palpations: Abdomen is soft.      Tenderness: There is no abdominal tenderness. There is no guarding or rebound.   Musculoskeletal:         General: No swelling.      Cervical back: Neck supple.      Right lower leg: No edema.      Left lower leg: No edema.   Skin:     General: Skin is warm and dry.      Capillary Refill: Capillary refill takes less than 2 seconds.   Neurological:      General: No focal deficit present.      Mental Status: He is alert and oriented to person, place, and time.   Psychiatric:         Mood and Affect: Mood normal.         Vital Signs  ED Triage Vitals [02/12/24 0858]   Temperature Pulse Respirations Blood Pressure SpO2   98.9 °F (37.2 °C) 86 20 154/65 98 %      Temp Source Heart Rate Source Patient Position - Orthostatic VS BP Location FiO2 (%)   Temporal Monitor Sitting Left arm --      Pain Score       No Pain           Vitals:    02/12/24 0858   BP: 154/65   Pulse: 86   Patient Position - Orthostatic VS: Sitting         Visual Acuity      ED Medications  Medications - No data to  display    Diagnostic Studies  Results Reviewed       None                   No orders to display              Procedures  Procedures         ED Course  ED Course as of 02/12/24 0929   Mon Feb 12, 2024   0900 Vital signs reviewed and within normal limits.   0915 Interventional radiology agrees that patient appears euvolemic and does not need any blood work or imaging. They will proceed with his procedure.   0920 Went over this with patient. Will discharge to the [facility].                                              Medical Decision Making  Patient is a 53-year-old male with relevant past medical history of CAD, cardiac arrest, CKD, diabetes mellitus, dialysis patient, GI bleed, hyperlipidemia, hypertension, myocardial infarction, renal failure, and status post renal transplant presenting with shortness of breath x 1 day. Patient has no chest pain, shortness of breath, or any other complaints upon my examination. Patient appears euvolemic.  Interventional radiology took a look at the patient and agreed with my assessment. They will proceed with his fistulogram. IR reports that he does not need lab work or imaging.   Dispo: Patient discharged to the [facility]. Advised patient to return the emergency room if he has any new or worsening symptoms. Patient is satisfied with care and agrees with the plan.    Amount and/or Complexity of Data Reviewed  External Data Reviewed: labs, radiology and notes.             Disposition  Final diagnoses:   SOB (shortness of breath)     Time reflects when diagnosis was documented in both MDM as applicable and the Disposition within this note       Time User Action Codes Description Comment    2/12/2024  9:19 AM Alejandro Roy Add [R06.02] SOB (shortness of breath)           ED Disposition       ED Disposition   Discharge    Condition   Stable    Date/Time   Mon Feb 12, 2024  9:19 AM    Comment   Berto Faria discharge to home/self care.                   Follow-up Information       Follow  up With Specialties Details Why Contact Info Additional Information    [facility] Emergency Department Emergency Medicine Go to  If symptoms worsen [address] [facility] [address] [phone] [facility] Emergency Department, [address], [place], [place] [address]            Patient's Medications   Discharge Prescriptions    No medications on file       No discharge procedures on file.    PDMP Review       None            ED Provider  Electronically Signed by             Alejandro Roy PA-C  02/12/24 0939

## 2024-02-12 NOTE — BRIEF OP NOTE (RAD/CATH)
INTERVENTIONAL RADIOLOGY PROCEDURE NOTE    Date: 2/12/2024    Procedure:   Procedure Summary       Date: 02/12/24 Room / Location: [facility] [place] Interventional Radiology    Anesthesia Start:  Anesthesia Stop:     Procedure: IR AV FISTULAGRAM/GRAFTOGRAM Diagnosis:       ESRD (end stage renal disease) on dialysis (HCC)      ([facility])    Scheduled Providers:  Responsible Provider:     Anesthesia Type: Not recorded ASA Status: Not recorded            Preoperative diagnosis:   1. ESRD (end stage renal disease) on dialysis (HCC)         Postoperative diagnosis: Same.    Surgeon: Tony Francisco MD     Assistant: None. No qualified resident was available.    Blood loss: 5 ml    Specimens: none.     Findings:   Fistulagram redemonstrated JANICE, treated with 8 mm HPB POBA.    Complications: None immediate.    Anesthesia: conscious sedation

## 2024-02-12 NOTE — NURSING NOTE
Pt has a good bruit and thrill at his right Avfistula site-IR staff came over and removed the wobble device that was on it and todl me patient could be discharged to home now. AV site is without bleeding.

## 2024-02-12 NOTE — DISCHARGE INSTRUCTIONS
[facility]  Interventional Radiology  [phone]      Fistulagram   WHAT YOU NEED TO KNOW:   Your arm or leg my  be sore, swollen, and bruised after the procedure. This is normal and should get better in a few days.     DISCHARGE INSTRUCTIONS:     Contact Interventional Radiology at [phone] (GERTRUDE PATIENTS: Contact Interventional Radiology at [phone]) (RILEY PATIENTS: Contact Interventional Radiology at [phone]) if:    You have a fever or chills.    Your puncture site is red, swollen, or draining pus.    You have nausea or are vomiting.    Your skin is itchy, swollen, or you have a rash.    You cannot feel a thrill over your graft or fistula.     You have questions or concerns about your condition or care.    Seek care immediately if:     You have bleeding that does not stop after 10 minutes of holding firm, direct pressure over the puncture site.    Blood soaks through your bandage.    Your hand or foot closest to the graft or fistula feels cold, painful, or numb.     Your hand or foot closest to the graft or fistula is pale or blue.     You have trouble moving your arm or leg closest to the graft or fistula.     Your bruise suddenly gets bigger.    Care for your wound as directed:  Remove the bandage in 4 to 6 hours or as         directed. Wash the area once a day with soap and water. Gently pat the area dry.     Apply firm, steady pressure to the puncture site if it bleeds.  Use a clean gauze or towel to hold pressure for 10 to 15 minutes. Call 911 if you cannot stop the bleeding or the bleeding gets heavier.     Feel for a thrill once a day or as directed.  Place your index and second finger over your fistula or graft as directed. You should feel a vibration. The vibration means that blood is flowing through your graft or fistula correctly.     Rest your arm or leg as directed.  Do not lift anything heavier than 5 pounds or do strenuous activity for 24 hours.      Prevent damage to your graft or fistula.  Do not wear tight-fitting clothing over your graft or fistula. Do not wear tight jewelry on the arm or leg with the graft or fistula. Tell healthcare providers not to do, IVs, blood draws, and blood pressure readings in the arm with your graft or fistula. Do not allow flu shots or vaccinations in your arm with your graft or fistula.    Follow up with your healthcare provider as directed

## 2024-02-12 NOTE — PROGRESS NOTES
Received ADT alert patient presented to ED this morning with sob.  He relays he monitors his SpO2 and it was reading 87-89% at home.  When he arrived at the ED his SpO2 was 98%.  He feels  his home pulse oximeter is not working properly.   He had an AV fistulagram done this morning following ED visit.  He denies any pain or discomfort at site.   He is managing independently at home and denies needing additional assistance.  He is back to work full time.  His dialysis schedule is Tues/Thurs/Sat .  Dialysis manages any volume overload with extra sessions.  He does not weigh at home due to being weighed at dialysis.  He denies chest pain, sob or LE edema. Hx of R AKA.  Nutritional intake adequate.  He monitors BS and they range from 100-160, this morning his BS was 143.  He is taking all medications as prescribed.  No f/u scheduled with PCP.  No transportation issues.  He denies any needs at this time.

## 2024-02-12 NOTE — NURSING NOTE
Pt has a good bruit and thrill at his right AV fistula site-IR staff came over and removed the wobble device that was on it and told me that patient could be discharged to home now. Av site is without bleeding.

## 2024-02-13 ENCOUNTER — VBI (OUTPATIENT)
Dept: FAMILY MEDICINE CLINIC | Facility: CLINIC | Age: 54
End: 2024-02-13

## 2024-02-13 NOTE — TELEPHONE ENCOUNTER
02/13/24 1:21 PM    Patient contacted post ED visit, [facility] department spoke with patient/caregiver and outreach was successful.    Thank you.  Victor Hugo Lubin  [facility]

## 2024-02-15 DIAGNOSIS — I25.10 CORONARY ARTERY DISEASE: ICD-10-CM

## 2024-02-15 DIAGNOSIS — I48.91 NEW ONSET A-FIB (HCC): ICD-10-CM

## 2024-02-15 RX ORDER — AMIODARONE HYDROCHLORIDE 200 MG/1
200 TABLET ORAL
Qty: 90 TABLET | Refills: 1 | Status: SHIPPED | OUTPATIENT
Start: 2024-02-15

## 2024-02-15 RX ORDER — CLOPIDOGREL BISULFATE 75 MG/1
75 TABLET ORAL DAILY
Qty: 90 TABLET | Refills: 1 | Status: SHIPPED | OUTPATIENT
Start: 2024-02-15

## 2024-02-18 ENCOUNTER — OFFICE VISIT (OUTPATIENT)
Dept: URGENT CARE | Facility: MEDICAL CENTER | Age: 54
End: 2024-02-18
Payer: COMMERCIAL

## 2024-02-18 VITALS
HEART RATE: 96 BPM | OXYGEN SATURATION: 100 % | DIASTOLIC BLOOD PRESSURE: 62 MMHG | SYSTOLIC BLOOD PRESSURE: 140 MMHG | WEIGHT: 107 LBS | BODY MASS INDEX: 18.37 KG/M2 | TEMPERATURE: 98.9 F | RESPIRATION RATE: 18 BRPM

## 2024-02-18 DIAGNOSIS — H61.23 BILATERAL IMPACTED CERUMEN: Primary | ICD-10-CM

## 2024-02-18 DIAGNOSIS — H92.01 OTALGIA OF RIGHT EAR: ICD-10-CM

## 2024-02-18 PROCEDURE — 69210 REMOVE IMPACTED EAR WAX UNI: CPT | Performed by: STUDENT IN AN ORGANIZED HEALTH CARE EDUCATION/TRAINING PROGRAM

## 2024-02-18 PROCEDURE — 69209 REMOVE IMPACTED EAR WAX UNI: CPT | Performed by: STUDENT IN AN ORGANIZED HEALTH CARE EDUCATION/TRAINING PROGRAM

## 2024-02-18 PROCEDURE — 99213 OFFICE O/P EST LOW 20 MIN: CPT | Performed by: STUDENT IN AN ORGANIZED HEALTH CARE EDUCATION/TRAINING PROGRAM

## 2024-02-18 NOTE — PROGRESS NOTES
[facility]        NAME: Berto Faria is a 53 y.o. male  : 1970    MRN: [id number]    Assessment and Plan   Bilateral impacted cerumen [H61.23]  1. Bilateral impacted cerumen  Ear cerumen removal      2. Otalgia of right ear  Ear cerumen removal        Unsuccessful removal of cerumen from the right ear.  Patient reported some improvement in pain but not adequate enough.  At this time rest of the cerumen in the right ear and cerumen in the left ear is very dry and I doubt this will resolve with just irrigation and instrumentation.  I recommend patient start using Debrox drops for at least 4 days prior to returning for cerumen removal as this will help soften the cerumen which will make removal a little easier and less painful.  Patient is agreeable with this plan and will return to our clinic or PCP for cerumen removal.  I do not suspect a inner ear infection as etiology of right ear pain as this has been ongoing for over a month.    Patient Instructions     See wrap up for details  Follow up with PCP in 3-5 days.  Proceed to  ER if symptoms worsen.    Chief Complaint     Chief Complaint   Patient presents with    Earache     Right ear pain started last month          History of Present Illness     HPI    Patient presents to the office today due to right ear pain and has been ongoing for about a month now.  Denies any discharge.  Denies any history of requiring cerumen to be removed.  Has been using Q-tips but did stop recently.  Denies cough, sore throat, fever, chills, dental problem, pain with swallowing.    Review of Systems   Review of Systems   Constitutional:  Negative for chills and fever.   HENT:  Positive for ear pain. Negative for ear discharge and sore throat.    Eyes:  Negative for pain and visual disturbance.   Respiratory:  Negative for cough and shortness of breath.    Cardiovascular:  Negative for chest pain and palpitations.   Gastrointestinal:  Negative for abdominal pain and  "vomiting.   Genitourinary:  Negative for dysuria and hematuria.   Musculoskeletal:  Negative for arthralgias and back pain.   Skin:  Negative for color change and rash.   Neurological:  Negative for seizures and syncope.   All other systems reviewed and are negative.    Current Medications       Current Outpatient Medications:     amiodarone 200 mg tablet, Take 1 tablet (200 mg total) by mouth daily with breakfast, Disp: 90 tablet, Rfl: 1    apixaban (ELIQUIS) 2.5 mg, Take 1 tablet (2.5 mg total) by mouth 2 (two) times a day, Disp: 180 tablet, Rfl: 1    aspirin 81 mg chewable tablet, Chew 81 mg every morning , Disp: , Rfl:     atorvastatin (LIPITOR) 80 mg tablet, Take 1 tablet (80 mg total) by mouth daily, Disp: 90 tablet, Rfl: 3    calcitriol (ROCALTROL) 0.5 MCG capsule, Take 1 capsule (0.5 mcg total) by mouth 3 (three) times a week, Disp: 12 capsule, Rfl: 0    cinacalcet (SENSIPAR) 30 mg tablet, Take 1 tablet (30 mg total) by mouth daily, Disp: 90 tablet, Rfl: 3    clopidogrel (PLAVIX) 75 mg tablet, Take 1 tablet (75 mg total) by mouth daily, Disp: 90 tablet, Rfl: 1    Glucagon, rDNA, (Glucagon Emergency) 1 MG KIT, INJECT 1MG SUBCUTANEOUSLY ONCE AS NEEDED FOR LOW BLOOD SUGAR FOR UP TO 1 DOSE, Disp: 2 kit, Rfl: 0    glucose blood (Contour Next Test) test strip, Use as instructed 6 times daily, Disp: 600 strip, Rfl: 5    insulin aspart (NovoLOG) 100 units/mL injection, 8 Units by Subcutaneous Insulin Pump route 2 (two) times a day with meals, Disp: , Rfl:     insulin glargine (Semglee) 100 units/mL subcutaneous injection, Inject 10 Units under the skin every 12 (twelve) hours, Disp: 20 mL, Rfl: 3    Insulin Syringe-Needle U-100 31G X 15/64\" 0.5 ML MISC, Use 3 (three) times a day, Disp: 100 each, Rfl: 4    midodrine (PROAMATINE) 10 MG tablet, Take 1 tablet (10 mg total) by mouth 3 (three) times a day before meals, Disp: 90 tablet, Rfl: 0    midodrine (PROAMATINE) 5 mg tablet, Take 1 tablet (5 mg total) by mouth 3 " (three) times a week Before dialysis (Patient taking differently: Take 5 mg by mouth 3 (three) times a week Before dialysis Tues thurs Sat), Disp: 12 tablet, Rfl: 0    multivitamin (THERAGRAN) TABS, Take 1 tablet by mouth daily, Disp: , Rfl:     pantoprazole (PROTONIX) 40 mg tablet, Take 1 tablet (40 mg total) by mouth 2 (two) times a day, Disp: 60 tablet, Rfl: 0    predniSONE 5 mg tablet, Take 1 tablet (5 mg total) by mouth daily, Disp: 90 tablet, Rfl: 3    tacrolimus (PROGRAF) 1 mg capsule, 2 caps in the morning and 1 cap in the evening, Disp: 270 capsule, Rfl: 3    Current Allergies     Allergies as of 02/18/2024    (No Known Allergies)            The following portions of the patient's history were reviewed and updated as appropriate: allergies, current medications, past family history, past medical history, past social history, past surgical history and problem list.     Past Medical History:   Diagnosis Date    Bacteremia 12/21/2018    CAD (coronary artery disease)     s/p MARC to LCx, pLAD 2/2018    Cardiac arrest (HCC)     Chronic kidney disease     Diabetes mellitus type 1 (HCC)     Dialysis patient (HCC)     Access in right chest    GI bleed     Hyperlipidemia     Hypertension     Infection at site of external fixator pin (HCC)     MI (myocardial infarction) (HCC)     Myocardial infarction (HCC)     Pneumonia     Last Assessed [id number]    Renal failure     Renal transplant, status post 07/21/2007       Past Surgical History:   Procedure Laterality Date    AMPUTATION Right 02/2019    aboce knee    ANKLE FRACTURE SURGERY      ANKLE HARDWARE REMOVAL Right 7/31/2017    Procedure: REMOVAL HARDWARE ANKLE;  Surgeon: Alvin Leon MD;  Location: [facility];  Service: Orthopedics    ANKLE HARDWARE REMOVAL Right 8/17/2017    Procedure: TIBIA FAILED HARDWARE REMOVAL;  Surgeon: Alvin Leon MD;  Location: [facility];  Service: Orthopedics    CARDIAC CATHETERIZATION Left 1/12/2024    Procedure: Cardiac Left Heart  Cath;  Surgeon: Spencer Bloom MD;  Location:  CARDIAC CATH LAB;  Service: Cardiology    CARDIAC CATHETERIZATION N/A 1/18/2024    Procedure: Cardiac pci;  Surgeon: Spencer Bloom MD;  Location:  CARDIAC CATH LAB;  Service: Cardiology    CARDIAC CATHETERIZATION N/A 1/18/2024    Procedure: Cardiac Impella Insertion;  Surgeon: Spencer Bloom MD;  Location:  CARDIAC CATH LAB;  Service: Cardiology    CLOSED REDUCTION ANKLE Right 7/3/2017    Procedure: CLOSED REDUCTION DISTAL TIB-FIB AND CASTING VS;  Surgeon: Alvin Leon MD;  Location: [facility];  Service: Orthopedics    CORONARY ANGIOPLASTY WITH STENT PLACEMENT  02/2018    CAD s/p MARC to LCx, pLAD    ESOPHAGOGASTRODUODENOSCOPY N/A 12/20/2018    Procedure: ESOPHAGOGASTRODUODENOSCOPY (EGD) in ICU;  Surgeon: Galileo Wise IV, MD;  Location: [facility];  Service: Gastroenterology    EYE SURGERY      FRACTURE SURGERY      ORIF Rt Ankle    GLUTAMIC ACID DECARBOXYLASE (HISTORICAL)      IR AV FISTULAGRAM/GRAFTOGRAM  4/16/2020    IR AV FISTULAGRAM/GRAFTOGRAM  6/23/2023    IR AV FISTULAGRAM/GRAFTOGRAM  2/12/2024    IR PICC LINE  8/20/2018    IR TEMPORARY DIALYSIS CATHETER CHECK/CHANGE/REPOSITION  1/8/2024    IR TUNNELED DIALYSIS CATHETER CHECK/CHANGE/REPOSITION/ANGIOPLASTY  1/8/2020    IR TUNNELED DIALYSIS CATHETER PLACEMENT  10/21/2019    IR TUNNELED DIALYSIS CATHETER REMOVAL  5/29/2020    IR VENOUS LINE REMOVAL  10/5/2018    LEG AMPUTATION Right 02/01/2019    Above the knee    NEPHRECTOMY TRANSPLANTED ORGAN      NH ARTERIOVENOUS ANASTOMOSIS OPEN DIRECT Right 2/11/2020    Procedure: CREATION FISTULA ARTERIOVENOUS (AV) right upper extremity;  Surgeon: Carlos Medina MD;  Location:  MAIN OR;  Service: Vascular    NH OPEN TREATMENT FRACTURE DISTAL TIBIA FIBULA Right 7/3/2017    Procedure: OPEN REDUCTION W/ INTERNAL FIXATION (ORIF);  Surgeon: Alvin Leon MD;  Location: [facility];  Service: Orthopedics    TOE AMPUTATION Right 10/27/2016    Procedure: AMPUTATION TOE;   Surgeon: Krishna Ruiz DPM;  Location: [facility];  Service:        Family History   Problem Relation Age of Onset    Diabetes Brother     Coronary artery disease Mother     No Known Problems Father          Medications have been verified.        Objective   /62   Pulse 96   Temp 98.9 °F (37.2 °C)   Resp 18   Wt 48.5 kg (107 lb)   SpO2 100%   BMI 18.37 kg/m²        Physical Exam     Physical Exam  Constitutional:       Appearance: Normal appearance.   HENT:      Head: Normocephalic and atraumatic.      Right Ear: There is impacted cerumen.      Left Ear: There is impacted cerumen.   Eyes:      General: No scleral icterus.  Cardiovascular:      Rate and Rhythm: Normal rate.   Pulmonary:      Effort: Pulmonary effort is normal. No respiratory distress.   Neurological:      General: No focal deficit present.      Mental Status: He is alert and oriented to person, place, and time.   Psychiatric:         Mood and Affect: Mood normal.         Behavior: Behavior normal.       Ear cerumen removal    Date/Time: 2/18/2024 9:30 AM    Performed by: Judith Ellis DO  Authorized by: Judith Ellis DO  Universal Protocol:  Consent: Verbal consent obtained.  Risks and benefits: risks, benefits and alternatives were discussed  Consent given by: patient  Patient understanding: patient states understanding of the procedure being performed  Patient identity confirmed: verbally with patient    Patient location:  Clinic  Procedure details:     Local anesthetic:  None    Location:  R ear    Procedure type: irrigation with instrumentation      Instrumentation: curette      Approach:  Natural orifice    Equipment used:  Elephant ear, light tip curette  Post-procedure details:     Complication:  Macerated skin    Hearing quality:  Normal    Patient tolerance of procedure:  Tolerated well, no immediate complications  Comments:      Attempted cerumen removal from right ear.  Appears somewhat dry.  Was able to irrigate and use  instrumentation to manually remove cerumen but not adequate enough to be able to visualize tympanic membrane as the dried cerumen continued to be present and after a certain point, I was no longer able to visualize auditory canal with a light tip curette to remove as irrigation was no longer effective.  Slightly macerated skin noted along the canal where the cerumen was removed.  No bleeding.  Patient denies any dizziness.

## 2024-02-18 NOTE — PATIENT INSTRUCTIONS
Start using over the counter debrox ear drops for at least 4 days before returning for ear wax removal.

## 2024-02-19 ENCOUNTER — TELEPHONE (OUTPATIENT)
Dept: NEPHROLOGY | Facility: CLINIC | Age: 54
End: 2024-02-19

## 2024-02-19 NOTE — TELEPHONE ENCOUNTER
Patient was started on these medications in the hospital, I am presuming that these were cleared by heart specialist, and nephrology while in the inpatient setting.  Will release for now, we will follow-up blood work at the dialysis unit

## 2024-02-20 NOTE — TELEPHONE ENCOUNTER
Tried reaching [facility], no answer.  Left message on voicemail for pharmacist from Dr. Stevenson:  Patient was started on these medications in the hospital, I am presuming that these were cleared by heart specialist, and nephrology while in the inpatient setting. Will release for now, we will follow-up blood work at the dialysis unit.    They should contact office with any questions/concerns.

## 2024-02-21 PROBLEM — J18.9 COMMUNITY ACQUIRED PNEUMONIA OF RIGHT LOWER LOBE OF LUNG: Status: RESOLVED | Noted: 2020-08-26 | Resolved: 2024-02-21

## 2024-02-21 PROBLEM — J18.9 MULTIFOCAL PNEUMONIA: Status: RESOLVED | Noted: 2020-09-16 | Resolved: 2024-02-21

## 2024-02-21 PROBLEM — A41.9 SEVERE SEPSIS (HCC): Status: RESOLVED | Noted: 2018-12-19 | Resolved: 2024-02-21

## 2024-02-21 PROBLEM — R65.20 SEVERE SEPSIS (HCC): Status: RESOLVED | Noted: 2018-12-19 | Resolved: 2024-02-21

## 2024-02-23 ENCOUNTER — OFFICE VISIT (OUTPATIENT)
Dept: FAMILY MEDICINE CLINIC | Facility: CLINIC | Age: 54
End: 2024-02-23
Payer: MEDICARE

## 2024-02-23 VITALS
SYSTOLIC BLOOD PRESSURE: 110 MMHG | DIASTOLIC BLOOD PRESSURE: 48 MMHG | HEART RATE: 80 BPM | BODY MASS INDEX: 18.27 KG/M2 | HEIGHT: 64 IN | OXYGEN SATURATION: 99 % | WEIGHT: 107 LBS | TEMPERATURE: 97.3 F

## 2024-02-23 DIAGNOSIS — H61.23 IMPACTED CERUMEN OF BOTH EARS: Primary | ICD-10-CM

## 2024-02-23 PROCEDURE — 99213 OFFICE O/P EST LOW 20 MIN: CPT | Performed by: FAMILY MEDICINE

## 2024-02-23 NOTE — PROGRESS NOTES
Assessment/Plan: Unsuccessful cleansing entire amount of wax out of right ear patient will prep the ear with Debrox and attempt second debridement later also the left ear is impacted also and I told him to try to soften that wax before we would irrigate that ear    Problem List Items Addressed This Visit    None  Visit Diagnoses       Impacted cerumen of both ears    -  Primary             Diagnoses and all orders for this visit:    Impacted cerumen of both ears        No problem-specific Assessment & Plan notes found for this encounter.        Subjective:      Patient ID: Berto Faria is a 53 y.o. male.    Patient has impacted cerumen of both ears not able to hear out of the right ear at all        The following portions of the patient's history were reviewed and updated as appropriate:   He has a past medical history of Bacteremia (12/21/2018), CAD (coronary artery disease), Cardiac arrest (HCC), Chronic kidney disease, Diabetes mellitus type 1 (HCC), Dialysis patient (HCC), GI bleed, Hyperlipidemia, Hypertension, Infection at site of external fixator pin (HCC), MI (myocardial infarction) ([facility]), Myocardial infarction (HCC), Pneumonia, Renal failure, and Renal transplant, status post (07/21/2007).,  does not have any pertinent problems on file.,   has a past surgical history that includes Nephrectomy transplanted organ; GLUTAMIC ACID DECARBOXYLASE (HISTORICAL); Eye surgery; Toe amputation (Right, 10/27/2016); pr open treatment fracture distal tibia fibula (Right, 7/3/2017); CLOSED REDUCTION ANKLE (Right, 7/3/2017); Ankle fracture surgery; Ankle hardware removal (Right, 7/31/2017); Ankle hardware removal (Right, 8/17/2017); Fracture surgery; IR PICC line (8/20/2018); IR venous line removal (10/5/2018); Esophagogastroduodenoscopy (N/A, 12/20/2018); Leg amputation (Right, 02/01/2019); IR tunneled dialysis catheter placement (10/21/2019); Coronary angioplasty with stent (02/2018); IR tunneled dialysis catheter  check/change/reposition/angioplasty (1/8/2020); Amputation (Right, 02/2019); pr arteriovenous anastomosis open direct (Right, 2/11/2020); IR AV fistulagram/graftogram (4/16/2020); IR tunneled dialysis catheter removal (5/29/2020); IR AV fistulagram/graftogram (6/23/2023); IR temporary dialysis catheter check/change/reposition (1/8/2024); Cardiac catheterization (Left, 1/12/2024); Cardiac catheterization (N/A, 1/18/2024); Cardiac catheterization (N/A, 1/18/2024); and IR AV fistulagram/graftogram (2/12/2024).,  family history includes Coronary artery disease in his mother; Diabetes in his brother; No Known Problems in his father.,   reports that he has never smoked. He has never used smokeless tobacco. He reports that he does not currently use alcohol. He reports that he does not use drugs.,  has No Known Allergies..  Current Outpatient Medications   Medication Sig Dispense Refill    amiodarone 200 mg tablet Take 1 tablet (200 mg total) by mouth daily with breakfast 90 tablet 1    apixaban (ELIQUIS) 2.5 mg Take 1 tablet (2.5 mg total) by mouth 2 (two) times a day 180 tablet 1    aspirin 81 mg chewable tablet Chew 81 mg every morning       atorvastatin (LIPITOR) 80 mg tablet Take 1 tablet (80 mg total) by mouth daily 90 tablet 3    calcitriol (ROCALTROL) 0.5 MCG capsule Take 1 capsule (0.5 mcg total) by mouth 3 (three) times a week 12 capsule 0    cinacalcet (SENSIPAR) 30 mg tablet Take 1 tablet (30 mg total) by mouth daily 90 tablet 3    clopidogrel (PLAVIX) 75 mg tablet Take 1 tablet (75 mg total) by mouth daily 90 tablet 1    Glucagon, rDNA, (Glucagon Emergency) 1 MG KIT INJECT 1MG SUBCUTANEOUSLY ONCE AS NEEDED FOR LOW BLOOD SUGAR FOR UP TO 1 DOSE 2 kit 0    glucose blood (Contour Next Test) test strip Use as instructed 6 times daily 600 strip 5    insulin aspart (NovoLOG) 100 units/mL injection 8 Units by Subcutaneous Insulin Pump route 2 (two) times a day with meals      insulin glargine (Semglee) 100 units/mL  "subcutaneous injection Inject 10 Units under the skin every 12 (twelve) hours 20 mL 3    Insulin Syringe-Needle U-100 31G X 15/64\" 0.5 ML MISC Use 3 (three) times a day 100 each 4    midodrine (PROAMATINE) 10 MG tablet Take 1 tablet (10 mg total) by mouth 3 (three) times a day before meals 90 tablet 0    midodrine (PROAMATINE) 5 mg tablet Take 1 tablet (5 mg total) by mouth 3 (three) times a week Before dialysis (Patient taking differently: Take 5 mg by mouth 3 (three) times a week Before dialysis Tues thurs Sat) 12 tablet 0    multivitamin (THERAGRAN) TABS Take 1 tablet by mouth daily      pantoprazole (PROTONIX) 40 mg tablet Take 1 tablet (40 mg total) by mouth 2 (two) times a day 60 tablet 0    predniSONE 5 mg tablet Take 1 tablet (5 mg total) by mouth daily 90 tablet 3    tacrolimus (PROGRAF) 1 mg capsule 2 caps in the morning and 1 cap in the evening 270 capsule 3     No current facility-administered medications for this visit.       Review of Systems   Constitutional:  Positive for activity change and fatigue. Negative for appetite change, diaphoresis and fever.   HENT:  Positive for hearing loss.         Active cerumen both ears irrigated the right ear and manually debrided the right ear was unsuccessful at getting all the wax out patient will use Debrox and we will wax Rx to complete the cleansing of the ear he will also treat the left ear and if he cannot get the wax out we will do so   Eyes:  Positive for visual disturbance.   Respiratory:  Negative for apnea, cough, chest tightness, shortness of breath and wheezing.    Cardiovascular:  Negative for chest pain, palpitations and leg swelling.   Gastrointestinal:  Negative for abdominal distention, abdominal pain, anal bleeding, constipation, diarrhea, nausea and vomiting.   Endocrine: Negative for cold intolerance, heat intolerance, polydipsia, polyphagia and polyuria.   Genitourinary:  Negative for difficulty urinating, dysuria, flank pain, hematuria and " "urgency.   Musculoskeletal:  Negative for arthralgias, back pain, gait problem, joint swelling and myalgias.   Skin:  Negative for color change, rash and wound.   Allergic/Immunologic: Negative for environmental allergies, food allergies and immunocompromised state.   Neurological:  Negative for dizziness, seizures, syncope, speech difficulty, numbness and headaches.   Hematological:  Negative for adenopathy. Does not bruise/bleed easily.   Psychiatric/Behavioral:  Negative for agitation, behavioral problems, hallucinations, sleep disturbance and suicidal ideas.          Objective:  Vitals:    02/23/24 1005   BP: (!) 110/48   BP Location: Right leg   Patient Position: Sitting   Cuff Size: Standard   Pulse: 80   Temp: (!) 97.3 °F (36.3 °C)   TempSrc: Temporal   SpO2: 99%   Weight: 48.5 kg (107 lb)   Height: 5' 4\" (1.626 m)     Body mass index is 18.37 kg/m².     Physical Exam  Constitutional:       General: He is not in acute distress.     Appearance: He is well-developed. He is not diaphoretic.   HENT:      Head: Normocephalic.      Right Ear: External ear normal.      Left Ear: External ear normal.      Nose: Nose normal.   Eyes:      General: No scleral icterus.        Right eye: No discharge.         Left eye: No discharge.      Conjunctiva/sclera: Conjunctivae normal.      Pupils: Pupils are equal, round, and reactive to light.   Neck:      Thyroid: No thyromegaly.      Trachea: No tracheal deviation.   Cardiovascular:      Rate and Rhythm: Normal rate and regular rhythm.      Heart sounds: Normal heart sounds. No murmur heard.     No friction rub. No gallop.   Pulmonary:      Effort: Pulmonary effort is normal. No respiratory distress.      Breath sounds: Normal breath sounds. No wheezing.   Abdominal:      General: Bowel sounds are normal.      Palpations: Abdomen is soft. There is no mass.      Tenderness: There is no abdominal tenderness. There is no guarding.   Musculoskeletal:         General: No " deformity.      Cervical back: Normal range of motion.   Lymphadenopathy:      Cervical: No cervical adenopathy.   Skin:     General: Skin is warm and dry.      Findings: No erythema or rash.   Neurological:      Mental Status: He is alert and oriented to person, place, and time.      Cranial Nerves: No cranial nerve deficit.   Psychiatric:         Thought Content: Thought content normal.

## 2024-03-13 DIAGNOSIS — N25.81 SECONDARY HYPERPARATHYROIDISM OF RENAL ORIGIN (HCC): ICD-10-CM

## 2024-03-13 DIAGNOSIS — N18.6 ESRD (END STAGE RENAL DISEASE) (HCC): Primary | ICD-10-CM

## 2024-03-13 RX ORDER — CINACALCET 30 MG/1
30 TABLET, FILM COATED ORAL DAILY
Qty: 90 TABLET | Refills: 3 | Status: SHIPPED | OUTPATIENT
Start: 2024-03-13

## 2024-03-13 RX ORDER — B COMPLEX, C NO.20/FOLIC ACID 1 MG
1 CAPSULE ORAL DAILY
Qty: 90 CAPSULE | Refills: 3 | Status: SHIPPED | OUTPATIENT
Start: 2024-03-13

## 2024-03-25 NOTE — ASSESSMENT & PLAN NOTE
Patient: Derek Enriquez    Procedure: Procedure(s):  Scrotectomy with Bilateral Orchiectomy       Diagnosis: Gender dysphoria [F64.9]  Diagnosis Additional Information: No value filed.    Anesthesia Type:   General     Note:    Oropharynx: oropharynx clear of all foreign objects and spontaneously breathing  Level of Consciousness: awake  Oxygen Supplementation: room air    Independent Airway: airway patency satisfactory and stable  Dentition: dentition unchanged  Vital Signs Stable: post-procedure vital signs reviewed and stable  Report to RN Given: handoff report given  Patient transferred to: PACU    Handoff Report: Identifed the Patient, Identified the Reponsible Provider, Reviewed the pertinent medical history, Discussed the surgical course, Reviewed Intra-OP anesthesia mangement and issues during anesthesia, Set expectations for post-procedure period and Allowed opportunity for questions and acknowledgement of understanding      Vitals:  Vitals Value Taken Time   BP     Temp 36.2  C (97.1  F) 03/25/24 1021   Pulse 64 03/25/24 1021   Resp 14 03/25/24 1021   SpO2 96 % 03/25/24 1023   Vitals shown include unfiled device data.    Electronically Signed By: GREGORIO Ambrocio CRNA  March 25, 2024  10:25 AM   As evidenced by low ScvO2, cool extremities, and newly depressed/worsening EF on STAT echo 01/11.   Now resolved     01/11 Echo:    Left Ventricle: Left ventricular cavity size is mildly dilated. Wall thickness is normal. There is eccentric hypertrophy. The left ventricular ejection fraction is 32%. Systolic function is severely reduced. There is severe global hypokinesis with specific regional wall abnormalities in the anteroseptal, inferior and apical regions. Diastolic function is abnormal.    Left Atrium: The atrium is moderately dilated.    Mitral Valve: There is mild to moderate regurgitation.    Tricuspid Valve: There is mild to moderate regurgitation. The right ventricular systolic pressure is moderately elevated. The estimated right ventricular systolic pressure is 57.00 mmHg.    Plan:  Weaned off Milrinone 1/14  Monitor endpoints of resuscitation  Continuous cardiopulmonary monitoring  Continue CRRT for volume management

## 2024-04-02 DIAGNOSIS — N18.6 ESRD (END STAGE RENAL DISEASE) (HCC): ICD-10-CM

## 2024-04-02 RX ORDER — MIDODRINE HYDROCHLORIDE 10 MG/1
10 TABLET ORAL
Qty: 270 TABLET | Refills: 1 | Status: SHIPPED | OUTPATIENT
Start: 2024-04-02

## 2024-04-26 ENCOUNTER — HOSPITAL ENCOUNTER (OUTPATIENT)
Dept: NON INVASIVE DIAGNOSTICS | Facility: HOSPITAL | Age: 54
Discharge: HOME | End: 2024-04-26
Payer: MEDICARE

## 2024-04-26 VITALS
BODY MASS INDEX: 18.25 KG/M2 | HEIGHT: 64 IN | HEART RATE: 74 BPM | SYSTOLIC BLOOD PRESSURE: 152 MMHG | WEIGHT: 106.92 LBS | DIASTOLIC BLOOD PRESSURE: 64 MMHG

## 2024-04-26 DIAGNOSIS — Z86.79 HISTORY OF ISCHEMIC CARDIOMYOPATHY: ICD-10-CM

## 2024-04-26 LAB
AORTIC ROOT: 2.8 CM
BSA FOR ECHO PROCEDURE: 1.5 M2
E WAVE DECELERATION TIME: 257 MS
E/A RATIO: 0.61
FRACTIONAL SHORTENING: 24 (ref 28–44)
INTERVENTRICULAR SEPTUM IN DIASTOLE (PARASTERNAL SHORT AXIS VIEW): 1.3 CM
INTERVENTRICULAR SEPTUM: 1.3 CM (ref 0.6–1.1)
LAAS-AP2: 15.9 CM2
LAAS-AP4: 17.3 CM2
LEFT ATRIUM SIZE: 4.1 CM
LEFT ATRIUM VOLUME (MOD BIPLANE): 48 ML
LEFT ATRIUM VOLUME INDEX (MOD BIPLANE): 32 ML/M2
LEFT INTERNAL DIMENSION IN SYSTOLE: 2.9 CM (ref 2.1–4)
LEFT VENTRICULAR INTERNAL DIMENSION IN DIASTOLE: 3.8 CM (ref 3.5–6)
LEFT VENTRICULAR POSTERIOR WALL IN END DIASTOLE: 1.3 CM
LEFT VENTRICULAR STROKE VOLUME: 30 ML
LVSV (TEICH): 30 ML
MV E'TISSUE VEL-LAT: 6 CM/S
MV E'TISSUE VEL-SEP: 5 CM/S
MV PEAK A VEL: 0.98 M/S
MV PEAK E VEL: 60 CM/S
MV STENOSIS PRESSURE HALF TIME: 74 MS
MV VALVE AREA P 1/2 METHOD: 2.97
PULM VEIN S/D RATIO: 0.75
PV PEAK D VEL: 0.12 M/S
PV PEAK S VEL: 0.09 M/S
RIGHT ATRIUM AREA SYSTOLE A4C: 9.1 CM2
RIGHT VENTRICLE ID DIMENSION: 2.5 CM
SL CV LEFT ATRIUM LENGTH A2C: 4.6 CM
SL CV LV EF: 45
SL CV PED ECHO LEFT VENTRICLE DIASTOLIC VOLUME (MOD BIPLANE) 2D: 62 ML
SL CV PED ECHO LEFT VENTRICLE SYSTOLIC VOLUME (MOD BIPLANE) 2D: 32 ML
TRICUSPID ANNULAR PLANE SYSTOLIC EXCURSION: 1.7 CM
TRICUSPID VALVE PEAK E WAVE VELOCITY: 0.07 M/S

## 2024-04-26 PROCEDURE — 93306 TTE W/DOPPLER COMPLETE: CPT | Performed by: INTERNAL MEDICINE

## 2024-04-26 PROCEDURE — 93306 TTE W/DOPPLER COMPLETE: CPT

## 2024-05-07 DIAGNOSIS — K92.0 GASTROINTESTINAL HEMORRHAGE WITH HEMATEMESIS: ICD-10-CM

## 2024-05-07 DIAGNOSIS — N25.81 SECONDARY HYPERPARATHYROIDISM OF RENAL ORIGIN (HCC): Primary | ICD-10-CM

## 2024-05-07 RX ORDER — PANTOPRAZOLE SODIUM 40 MG/1
40 TABLET, DELAYED RELEASE ORAL DAILY
Qty: 90 TABLET | Refills: 2 | Status: SHIPPED | OUTPATIENT
Start: 2024-05-07

## 2024-05-07 RX ORDER — SEVELAMER CARBONATE 800 MG/1
800 TABLET, FILM COATED ORAL
Qty: 270 TABLET | Refills: 2 | Status: SHIPPED | OUTPATIENT
Start: 2024-05-07

## 2024-05-14 DIAGNOSIS — N18.6 TYPE 1 DIABETES MELLITUS WITH END-STAGE RENAL DISEASE (ESRD) (HCC): ICD-10-CM

## 2024-05-14 DIAGNOSIS — E10.22 TYPE 1 DIABETES MELLITUS WITH END-STAGE RENAL DISEASE (ESRD) (HCC): ICD-10-CM

## 2024-05-14 RX ORDER — INSULIN GLARGINE 100 [IU]/ML
10 INJECTION, SOLUTION SUBCUTANEOUS EVERY 12 HOURS SCHEDULED
Qty: 20 ML | Refills: 3 | Status: SHIPPED | OUTPATIENT
Start: 2024-05-14

## 2024-06-02 PROBLEM — I21.9 ACUTE MYOCARDIAL INFARCTION (HCC): Status: ACTIVE | Noted: 2017-09-23

## 2024-06-02 PROBLEM — I50.32 CHRONIC DIASTOLIC (CONGESTIVE) HEART FAILURE (HCC): Status: ACTIVE | Noted: 2021-02-10

## 2024-06-02 PROBLEM — E10.65 TYPE 1 DIABETES MELLITUS WITH HYPERGLYCEMIA (HCC): Status: ACTIVE | Noted: 2024-01-01

## 2024-06-02 PROBLEM — I25.5 ISCHEMIC CARDIOMYOPATHY: Status: ACTIVE | Noted: 2024-01-01

## 2024-06-02 PROBLEM — I95.9 HYPOTENSION: Status: ACTIVE | Noted: 2024-01-01

## 2024-06-02 PROBLEM — I21.3 STEMI (ST ELEVATION MYOCARDIAL INFARCTION) (HCC): Status: ACTIVE | Noted: 2017-09-23

## 2024-06-02 NOTE — PROCEDURES
Intraosseous Line Insertion    Date/Time: 6/2/2024 9:35 AM    Performed by: Morales Hernandez MD  Authorized by: Morales Hernandez MD    Patient location:  Bedside  Other Assisting Provider: No    Consent:     Consent obtained:  Emergent situation    Alternatives discussed:  Delayed treatment and no treatment  Indication:     Indications: clinical deterioration, hemodynamic instability and medication administration    Pre-procedure details:     Site preparation:  Alcohol  Procedure details:     Insertion site:  Proximal tibia    Laterality:  Left    Insertion device:  Drill device    IO Size:  25mm (blue)    Insertion: Needle was inserted through the bony cortex      Number of attempts:  1    Successful placement: yes      Insertion confirmation:  Easy infusion of fluids  Post-procedure details:     Patient tolerance of procedure:  Tolerated well, no immediate complications

## 2024-06-02 NOTE — ASSESSMENT & PLAN NOTE
Pressures have been in the 70s to 90s systolic.  Extremities are cool.  This morning his CO2 level was 15.  Venous blood gas demonstrated a pH of 7.2 this morning previously being 7.4 last night.  Lactic acid level drawn but results pending.

## 2024-06-02 NOTE — CONSULTS
This is not a formal consult note. This note is to close out consult that is in chart.    Nephrology team was consulted to evaluate the patient given history of ESRD.  I was on the way to see the patient this morning due to referral for our team and the primary attending stopped me to discuss case.  Patient unfortunately had a cardiac arrest and is unfortunately .  I was not involved in the care of this patient and was on the way to see the patient.    Primary team attending and I reviewed the events, patient initially presented with hypotension, not feeling well and was unstable and transferred.  Patient was transferred to [facility] and then to ICU.  Patient was found to have concern for STEMI.  And cardiology team was on board and was planning urgent transfer to [facility].  Repeat potassium was normal this morning.  EKG changes were likely secondary to STEMI.  Patient continued to be hemodynamically unstable and attempts were being made to stabilize pt; course ultimately included cardiac arrest.  Please refer to primary team and cardiology team for exact event notes.      Primary team attending states he already spoke to the patient's family.  But we are available to discuss if any questions arise from the renal standpoint with the family at any time.  But in an attempt to allow family to have time with the patient, I will not disturb the family just now.

## 2024-06-02 NOTE — ASSESSMENT & PLAN NOTE
Lab Results   Component Value Date    HGBA1C 7.7 (H) 01/07/2024       Recent Labs     06/02/24  0034 06/02/24  0258   POCGLU 576* 532*       Blood Sugar Average: Last 72 hrs:  BG elevated in the 500's while in the [facility] ED. After receiving additional 10 units regular insulin, bg now 461 on arrival.   Bicarb 23, anion gap 15, will order VBG, beta hydroxybutyrate to rule out DKA, however patient does report blood glucose levels typically well controlled at home  Continue home regimen of 10 units Lantus bid  SSI with accucheks

## 2024-06-02 NOTE — ASSESSMENT & PLAN NOTE
Continue tacrolimus 2mg qAM, 1mg qPM, and prednisone 5 mg daily  Followed outpatient by nephrology

## 2024-06-02 NOTE — ASSESSMENT & PLAN NOTE
Lab Results   Component Value Date    EGFR 14 06/02/2024    EGFR 10 06/01/2024    EGFR 10 02/02/2024    CREATININE 4.27 (H) 06/02/2024    CREATININE 5.62 (H) 06/01/2024    CREATININE 5.59 (H) 02/02/2024   End-stage renal disease on dialysis.  Yesterday while receiving a routine dialysis he became hypotensive, diaphoretic and developed pain in his right shoulder blade.  At the beginning of dialysis his potassium was 5.6.  However this morning a BMP demonstrated his potassium to be 3.3 at 6:39 AM

## 2024-06-02 NOTE — PROCEDURES
Intubation    Date/Time: 6/2/2024 1:10 PM    Performed by: GAVINO Childs  Authorized by: GAVINO Childs    Patient location:  Bedside  Other Assisting Provider: No    Consent:     Consent obtained:  Emergent situation  Universal protocol:     Patient identity confirmed:  Arm band and hospital-assigned identification number  Pre-procedure details:     Mallampati score:  2    Pretreatment medications:  Etomidate    Paralytics:  None  Indications:     Indications for intubation: respiratory failure    Procedure details:     Preoxygenation:  Bag valve mask    CPR in progress: no      Intubation method:  Oral    Oral intubation technique:  Glidescope    Laryngoscope blade:  Mac 3    Tube size (mm):  8.0    Tube type:  Cuffed    Number of attempts:  1    Ventilation between attempts: no      Cricoid pressure: no      Tube visualized through cords: no    Placement assessment:     Tube secured with:  ETT claudio    Breath sounds:  Equal    Placement verification: condensation, equal breath sounds and ETCO2 detector

## 2024-06-02 NOTE — DISCHARGE SUMMARY
[facility]  Discharge- Berto Faria 1970, 53 y.o. male MRN: [id number]  Unit/Bed#: ICU 10 Encounter: [id number]  Primary Care Provider: Dalton Anderson DO   Date and time admitted to hospital: 6/2/2024  4:55 AM    * Acute myocardial infarction (HCC)  Assessment & Plan  Patient was a transfer from [facility] with hyperkalemia and and end-stage renal disease patient.      On arrival to [place] he developed an acute myocardial infarction.  Cardiology was concerned that this was a ST elevation MI, they contacted interventional cardiology.  They felt he was too critically ill to be treated at [facility]  He was started on a heparin drip but quickly decompensated.  We were coordinating transfer to [place] and had transferred him to the ICU as he was decompensated.  They intubated the patient.  But then he was a CODE BLUE, cardiac arrest      Patient unfortunately did not respond to ACLS protocols  Time of death was 9:52 AM on June 2, 2024    His wife is en route to the hospital and was made aware of his death        Discharging Physician / Practitioner: Foreign Banks DO  PCP: Dalton Anderson DO  Admission Date:   Admission Orders (From admission, onward)       Ordered        06/02/24 0506  INPATIENT ADMISSION  Once                          Discharge Date: 06/02/24    Medical Problems       Resolved Problems  Date Reviewed: 6/2/2024   None           Consultations During Hospital Stay:  Cardiology  Critical care          Reason for Admission: Hypotension      Hospital Course:     Berto Faria is a 53 y.o. male patient who originally presented to the hospital on 6/2/2024 due to patient was seen at [facility] emergency room on June 1, 2024.  He had dialysis as an outpatient that day and now presented with low blood pressures.  His pressures were low at dialysis so they were taking off less fluid than they would have wanted.  He was also complaining some back pain.  It  "was decided to transfer him to [facility] as he may need further dialysis.  On arrival here.  He was still having borderline low blood pressures.  Complaining of back pain.  Laboratory data was obtained on arrival.  His troponin was found to be significantly elevated at 5000.  EKG was done.  He was seen immediately by cardiology.  They felt this was consistent with a ST elevation MI and he was potentially going into cardiogenic shock.  We coordinated transfer to [facility] to see high risk cardiac critical care as it was not felt Accardi catheterization at [place] would be successful as he would need much more specialized cardiac care.  We sent him to the ICU here as his breathing was significantly worsening.  Unfortunately he had a cardiac arrest in the ICU.  And did not survive ACLS protocols.  He was pronounced dead on 2024 at 9:52 AM.  We did make his wife aware and she is en route to the hospital.    Please see above list of diagnoses and related plan for additional information.       Condition at Discharge:          Discharge Day Visit / Exam:     Subjective:  patient is         Vitals: Blood Pressure: (!) 85/55 (24 0846)  Pulse: (!) 0 (24 0945)  Temperature: 97.5 °F (36.4 °C) (24)  Temp Source: Oral (24)  Respirations: 20 (24)  Height: 5' 4\" (162.6 cm) (24 0525)  Weight - Scale: 44.7 kg (98 lb 8.7 oz) (24 06)  SpO2: (!) 86 % (24)    Exam:     Physical Exam  No breath sounds  No pulse           Discharge instructions/Information to patient and family:   See after visit summary for information provided to patient and family.      Provisions for Follow-Up Care:  See after visit summary for information related to follow-up care and any pertinent home health orders.      Disposition:     Other:        Discharge Statement:  I spent 36 minutes discharging the patient. This time was spent on the " day of discharge. I had direct contact with the patient on the day of discharge. Greater than 50% of the total time was spent examining patient, answering all patient questions, arranging and discussing plan of care with patient as well as directly providing post-discharge instructions.  Additional time then spent on discharge activities.    Discharge Medications:  See after visit summary for reconciled discharge medications provided to patient and family.      ** Please Note: This note has been constructed using a voice recognition system **

## 2024-06-02 NOTE — CONSULTS
[facility]  Consult  Name: Berto Faria 53 y.o. male I MRN: [id number]  Unit/Bed#: [address] 220-01 I Date of Admission: 6/2/2024   Date of Service: 6/2/2024 I Hospital Day: 0    Inpatient consult to Cardiology  Consult performed by: Dawit Dupree MD  Consult ordered by: Ottoniel Correa PA-C          Assessment & Plan   STEMI (ST elevation myocardial infarction) ([facility])  Assessment & Plan  Patient with prior non-ST elevation MI in January 2024 underwent cardiac catheterization which demonstrated a ostial to proximal 95% LAD and a ostial to proximal 75% circumflex lesions with a 50% left main coronary artery lesion and a total RCA.  6 days later he had an Impella placed with subsequent stents placed in the LAD and circumflex vessels.      Yesterday due to hypotension during dialysis, diaphoresis, feeling lightheaded and pain in the right scapular area his dialysis was ended early.  Initially his right scapular pain was waxing and waning.  Last night the pain became constant and has continued all night.  Troponin trend: 145, 139, 125, 117, 295, 5420.  His EKG demonstrates an increase in QRS duration from January 8, 2024 of 108 QRS duration presently of 160.  His EKG at 1 AM this morning demonstrated ST elevation in leads V1 and V2 of 4 mm and ST depression in V5 and V6 of 6-8 mm.  These changes in ST segments are new when compared to old EKGs.  Although patient has a LBBB, these changes are strongly suggestive of a STEMI.  His QTc is prolonged but when corrected for the QRS duration his QT/QTc are 430/523.  Patient's blood pressure has ranged from 77-93 systolic.  His O2 saturation is in the 80s.    Discussed patient with Dr. Saini who agrees the patient should be transferred to [place] ICU for further management.    ESRD (end stage renal disease) ([facility])  Assessment & Plan  Lab Results   Component Value Date    EGFR 14 06/02/2024    EGFR 10 06/01/2024    EGFR 10 02/02/2024     CREATININE 4.27 (H) 06/02/2024    CREATININE 5.62 (H) 06/01/2024    CREATININE 5.59 (H) 02/02/2024   End-stage renal disease on dialysis.  Yesterday while receiving a routine dialysis he became hypotensive, diaphoretic and developed pain in his right shoulder blade.  At the beginning of dialysis his potassium was 5.6.  However this morning a BMP demonstrated his potassium to be 3.3 at 6:39 AM    Cardiogenic shock ([facility])  Assessment & Plan  Pressures have been in the 70s to 90s systolic.  Extremities are cool.  This morning his CO2 level was 15.  Venous blood gas demonstrated a pH of 7.2 this morning previously being 7.4 last night.  Lactic acid level drawn but results pending.    Atrial fibrillation ([facility])  Assessment & Plan  Patient had new onset atrial fibrillation in January 2024.  He was discharged on Eliquis 2.5 mg twice daily and amiodarone 200 mg daily.  On admission the amiodarone was held.    Type 1 diabetes mellitus with hyperglycemia ([facility])  Assessment & Plan  Lab Results   Component Value Date    HGBA1C 7.7 (H) 01/07/2024       Recent Labs     06/02/24  0034 06/02/24  0258 06/02/24  0516 06/02/24  0731   POCGLU 576* 532* 461* 366*       Blood Sugar Average: Last 72 hrs:  (P) 413.5  Management by primary team    Renal transplant failure and rejection  Assessment & Plan  Renal transplant failure    S/P pancreatic islet cell transplantation ([facility])  Assessment & Plan  S/p pancreatic islet cell transplant    S/P AKA (above knee amputation), right ([facility])  Assessment & Plan  S/p AKA       Interval history:  See above note regarding STEMI    PROBLEM LIST:    Patient Active Problem List    Diagnosis Date Noted    STEMI (ST elevation myocardial infarction) ([facility]) 09/23/2017    ESRD (end stage renal disease) ([facility]) 12/03/2019    Cardiogenic shock ([facility]) 01/11/2024    Atrial fibrillation ([facility]) 01/11/2024    Type 1 diabetes mellitus with hyperglycemia ([facility]) 06/02/2024    Renal transplant failure and rejection 01/08/2024     "S/P pancreatic islet cell transplantation ([facility]) 01/25/2024    S/P AKA (above knee amputation), right ([facility]) 04/16/2019    Hypotension 06/02/2024    Ischemic cardiomyopathy 06/02/2024    Hx of AKA (above knee amputation) ([facility]) 01/08/2024    Acute on chronic diastolic (congestive) heart failure ([facility]) 10/10/2022    Vasovagal syncope 10/01/2022    Elevated MCV 02/10/2021    Hyperbilirubinemia 02/10/2021    Chronic diastolic (congestive) heart failure ([facility]) 02/10/2021    Elevated procalcitonin 02/10/2021    Mitral valve insufficiency 02/10/2021    Abnormal EKG 02/10/2021    Pancreatic lesion 06/22/2020    Abnormal CT scan, colon 06/22/2020    Chronic kidney disease, unspecified 01/15/2020    Coronary artery disease involving native coronary artery 12/23/2019    Pre-operative cardiovascular examination 12/23/2019    Acute pulmonary edema ([facility]) 10/20/2019    Acute on chronic anemia 10/14/2019    Pulmonary hypertension ([facility]) 10/13/2019    Acute blood loss anemia 09/24/2019    Depressed left ventricular ejection fraction 02/06/2019    Hyperphosphatemia 12/20/2018    Hyponatremia 12/20/2018    GI bleed w/ Hemorrhagic Shock 12/20/2018    Urinary retention 09/24/2017    Chronic osteomyelitis of tibia ([facility]) 09/23/2017    Elevated troponin 09/23/2017    Diarrhea 09/23/2017    Cellulitis of ankle 09/23/2017    Closed fracture of distal end of right tibia with routine healing 07/03/2017    Osteomyelitis ([facility]) 12/07/2016    Poor circulation 12/07/2016    Type 1 diabetes mellitus on insulin therapy ([facility]) 10/26/2016    Renal transplant, status post 10/26/2016    Hyperkalemia 10/26/2016    Immunosuppression ([facility]) 11/04/2014    Hypertension 08/04/2010       Vitals: BP (!) 85/55   Pulse (!) 0   Temp 97.5 °F (36.4 °C) (Oral)   Resp 20   Ht 5' 4\" (1.626 m)   Wt 44.7 kg (98 lb 8.7 oz)   SpO2 (!) 86%   BMI 16.92 kg/m²   PREVIOUS WEIGHTS:   Weight (last 2 days)       Date/Time Weight    06/02/24 0600 44.7 (98.55)    06/02/24 0525 44.7 " (98.55)            Wt Readings from Last 2 Encounters:   06/02/24 44.7 kg (98 lb 8.7 oz)   06/01/24 45 kg (99 lb 3.3 oz)       Labs/Data:        Results from last 7 days   Lab Units 06/01/24  2314   WBC Thousand/uL 10.51*   HEMOGLOBIN g/dL 13.4   HEMATOCRIT % 43.5   MCV fL 97   MCH pg 30.0   MCHC g/dL 30.8*   PLATELETS Thousands/uL 273     Results from last 7 days   Lab Units 06/02/24  0639 06/01/24  2314   EGFR ml/min/1.73sq m 14 10   SODIUM mmol/L 135 130*   POTASSIUM mmol/L 3.3* 5.6*   CHLORIDE mmol/L 80* 92*   CO2 mmol/L 15* 23   BUN mg/dL 44* 52*   CREATININE mg/dL 4.27* 5.62*     Results from last 7 days   Lab Units 06/02/24  0639 06/01/24  2314   CALCIUM mg/dL 6.3* 8.5   MAGNESIUM mg/dL 1.9  --      Lab Results   Component Value Date    NTBNP 8,513 (H) 10/10/2022    NTBNP 24,494 (H) 02/08/2021    NTBNP 15,909 (H) 10/18/2020     Lab Results   Component Value Date    CHOLESTEROL 73 05/17/2023    TRIG 51 05/17/2023    HDL 43 05/17/2023    LDLCALC 20 05/17/2023    HGBA1C 7.7 (H) 01/07/2024       Results from last 7 days   Lab Units 06/02/24  0834   INR  1.22*   PROTIME seconds 15.6*          Current Facility-Administered Medications:     acetaminophen (TYLENOL) tablet 650 mg, 650 mg, Oral, Q6H PRN, Ottoniel Correa PA-C    aspirin chewable tablet 324 mg, 324 mg, Oral, Once, Dawit Dupree MD    atorvastatin (LIPITOR) tablet 80 mg, 80 mg, Oral, Daily With Dinner, Ottoniel Correa PA-C    [START ON 6/3/2024] calcitriol (ROCALTROL) capsule 0.5 mcg, 0.5 mcg, Oral, Once per day on Monday Wednesday Friday, Ottoniel Correa PA-C    cinacalcet (SENSIPAR) tablet 30 mg, 30 mg, Oral, Daily, Ottoniel Correa PA-C    clopidogrel (PLAVIX) tablet 75 mg, 75 mg, Oral, Daily, Ottoniel Correa PA-C    EPINEPHrine (ADRENALIN) injection, , Intravenous, Code/Trauma/Sedation Med, GAVINO Childs, 1 mg at 06/02/24 0921    EPINEPHrine 5,000 mcg (STANDARD CONCENTRATION) IV in sodium chloride 0.9% 250 mL, 1-10 mcg/min,  Intravenous, Titrated, GAVINO Childs    heparin (porcine) 25,000 units in 0.45% NaCl 250 mL infusion (premix), 3-20 Units/kg/hr (Order-Specific), Intravenous, Titrated, Ottoniel Correa PA-C, Last Rate: 4.8 mL/hr at 06/02/24 0845, 12 Units/kg/hr at 06/02/24 0845    heparin (porcine) injection 1,200 Units, 1,200 Units, Intravenous, Q6H PRN, Ottoniel Correa PA-C    heparin (porcine) injection 2,400 Units, 2,400 Units, Intravenous, Q6H PRN, Ottoniel Correa PA-C    insulin glargine (LANTUS) subcutaneous injection 10 Units 0.1 mL, 10 Units, Subcutaneous, Q12H JAMARCUS, Ottoniel Correa PA-C, 10 Units at 06/02/24 0552    insulin lispro (HumALOG/ADMELOG) 100 units/mL subcutaneous injection 1-5 Units, 1-5 Units, Subcutaneous, TID AC, 3 Units at 06/02/24 0845 **AND** Fingerstick Glucose (POCT), , , TID AC, Ottoniel Correa PA-C    insulin lispro (HumALOG/ADMELOG) 100 units/mL subcutaneous injection 1-5 Units, 1-5 Units, Subcutaneous, HS, Ottoniel Correa PA-C    midodrine (PROAMATINE) tablet 10 mg, 10 mg, Oral, TID AC, Ottoniel Correa PA-C, 10 mg at 06/02/24 0612    pantoprazole (PROTONIX) EC tablet 40 mg, 40 mg, Oral, Daily Before Breakfast, Ottoniel Correa PA-C, 40 mg at 06/02/24 0612    predniSONE tablet 5 mg, 5 mg, Oral, Daily, Ottoniel Correa PA-C    sevelamer (RENAGEL) tablet 800 mg, 800 mg, Oral, TID With Meals, Ottoniel Correa PA-C    sodium bicarbonate 8.4 % injection, , Intravenous, Code/Trauma/Sedation Med, GAVINO Childs, 50 mEq at 06/02/24 0920    tacrolimus (PROGRAF) capsule 1 mg, 1 mg, Oral, HS, Ottoniel Correa PA-C    tacrolimus (PROGRAF) capsule 2 mg, 2 mg, Oral, Daily, Ottoniel Correa PA-C    vasopressin (PITRESSIN) 20 Units in sodium chloride 0.9 % 100 mL infusion, 0.04 Units/min, Intravenous, Continuous, GAVINO Childs  No Known Allergies      Intake/Output Summary (Last 24 hours) at 6/2/2024 0945  Last data filed at 6/2/2024 0611  Gross per 24 hour   Intake 60 ml    Output --   Net 60 ml       Invasive Devices       Peripheral Intravenous Line  Duration             Peripheral IV 06/01/24 Left;Proximal;Ventral (anterior) Forearm 1 day              Line  Duration             Hemodialysis AV Fistula Right Upper arm -- days                    Review of Systems   Constitutional:  Positive for diaphoresis.   HENT: Negative.     Eyes: Negative.    Respiratory:  Positive for shortness of breath. Negative for chest tightness.    Cardiovascular:  Positive for chest pain (Planes of pain behind his right shoulder blade). Negative for palpitations and leg swelling.   Gastrointestinal:  Positive for nausea.   Endocrine: Negative.    Musculoskeletal: Negative.    Skin: Negative.    Allergic/Immunologic: Negative.    Neurological: Negative.    Hematological: Negative.    Psychiatric/Behavioral: Negative.         Physical Exam  Vitals reviewed.   Constitutional:       General: He is in acute distress.      Appearance: He is well-developed. He is ill-appearing and diaphoretic.   HENT:      Head: Normocephalic and atraumatic.   Neck:      Thyroid: No thyromegaly.      Vascular: No carotid bruit or JVD.      Trachea: No tracheal deviation.   Cardiovascular:      Rate and Rhythm: Normal rate and regular rhythm.      Pulses: Normal pulses.      Heart sounds: Heart sounds are distant. No murmur heard.     No friction rub. No gallop.   Pulmonary:      Effort: Pulmonary effort is normal. No respiratory distress.      Breath sounds: Examination of the right-middle field reveals rales. Examination of the left-middle field reveals rales. Examination of the right-lower field reveals rales. Examination of the left-lower field reveals rales. Rales present. No wheezing or rhonchi.   Chest:      Chest wall: No tenderness.   Abdominal:      General: Bowel sounds are normal. There is no distension.      Palpations: Abdomen is soft.      Tenderness: There is no abdominal tenderness.      Comments: Having dry  "heaves   Musculoskeletal:      Cervical back: Normal range of motion and neck supple.      Right lower leg: No edema.      Left lower leg: No edema.   Skin:     General: Skin is warm.      Coloration: Skin is pale.      Comments: Diaphoretic and clammy   Neurological:      General: No focal deficit present.      Mental Status: He is alert and oriented to person, place, and time.   Psychiatric:         Behavior: Behavior normal.         Thought Content: Thought content normal.         Judgment: Judgment normal.         ======================================================     Imaging:   I have personally reviewed pertinent reports.        EKG: Sinus rhythm with left bundle branch block pattern and marked ST-T wave abnormalities.      Portions of the record may have been created with voice recognition software. Occasional wrong word or \"sound a like\" substitutions may have occurred due to the inherent limitations of voice recognition software. Read the chart carefully and recognize, using context, where substitutions have occurred.        " stated

## 2024-06-02 NOTE — EMTALA/ACUTE CARE TRANSFER
[facility] EMERGENCY DEPARTMENT  [address] [address]  Dept: [phone]      EMTALA TRANSFER CONSENT    NAME Berto Faria                                         1970                              MRN [id number]    I have been informed of my rights regarding examination, treatment, and transfer   by Dr. Ruslan Merlos MD    Benefits: Specialized equipment and/or services available at the receiving facility (Include comment)________________________ (Dialysis)    Risks: Potential deterioration of medical condition, Loss of IV, Possible worsening of condition or death during transfer, Potential for delay in receiving treatment, Increased discomfort during transfer      Transfer Request   I acknowledge that my medical condition has been evaluated and explained to me by the emergency department physician or other qualified medical person and/or my attending physician who has recommended and offered to me further medical examination and treatment. I understand the Hospital's obligation with respect to the treatment and stabilization of my emergency medical condition. I nevertheless request to be transferred. I release the Hospital, the doctor, and any other persons caring for me from all responsibility or liability for any injury or ill effects that may result from my transfer and agree to accept all responsibility for the consequences of my choice to transfer, rather than receive stabilizing treatment at the Hospital. I understand that because the transfer is my request, my insurance may not provide reimbursement for the services.  The Hospital will assist and direct me and my family in how to make arrangements for transfer, but the hospital is not liable for any fees charged by the transport service.  In spite of this understanding, I refuse to consent to further medical examination and treatment which has been offered to me, and request transfer to Accepting Facility  Name, City & State : [facility], [place], [place]. I authorize the performance of emergency medical procedures and treatments upon me in both transit and upon arrival at the receiving facility.  Additionally, I authorize the release of any and all medical records to the receiving facility and request they be transported with me, if possible.    I authorize the performance of emergency medical procedures and treatments upon me in both transit and upon arrival at the receiving facility.  Additionally, I authorize the release of any and all medical records to the receiving facility and request they be transported with me, if possible.  I understand that the safest mode of transportation during a medical emergency is an ambulance and that the Hospital advocates the use of this mode of transport. Risks of traveling to the receiving facility by car, including absence of medical control, life sustaining equipment, such as oxygen, and medical personnel has been explained to me and I fully understand them.    (LARISSA CORRECT BOX BELOW)  [  ]  I consent to the stated transfer and to be transported by ambulance/helicopter.  [  ]  I consent to the stated transfer, but refuse transportation by ambulance and accept full responsibility for my transportation by car.  I understand the risks of non-ambulance transfers and I exonerate the Hospital and its staff from any deterioration in my condition that results from this refusal.    X___________________________________________    DATE  24  TIME________  Signature of patient or legally responsible individual signing on patient behalf           RELATIONSHIP TO PATIENT_________________________          Provider Certification    NAME Berto Faria                                         1970                              MRN [id number]    A medical screening exam was performed on the above named patient.  Based on the examination:    Condition Necessitating  Transfer The primary encounter diagnosis was Hyperkalemia. Diagnoses of Hypotension, Hyponatremia, and EKG abnormalities were also pertinent to this visit.    Patient Condition: The patient has been stabilized such that within reasonable medical probability, no material deterioration of the patient condition or the condition of the unborn child(tiesha) is likely to result from the transfer    Reason for Transfer: Level of Care needed not available at this facility    Transfer Requirements: Facility [facility], [place], [place]   Space available and qualified personnel available for treatment as acknowledged by    Agreed to accept transfer and to provide appropriate medical treatment as acknowledged by       Dr. Banks  Appropriate medical records of the examination and treatment of the patient are provided at the time of transfer   STAFF INITIAL WHEN COMPLETED _______  Transfer will be performed by qualified personnel from    and appropriate transfer equipment as required, including the use of necessary and appropriate life support measures.    Provider Certification: I have examined the patient and explained the following risks and benefits of being transferred/refusing transfer to the patient/family:  General risk, such as traffic hazards, adverse weather conditions, rough terrain or turbulence, possible failure of equipment (including vehicle or aircraft), or consequences of actions of persons outside the control of the transport personnel, Unanticipated needs of medical equipment and personnel during transport, Risk of worsening condition, The possibility of a transport vehicle being unavailable      Based on these reasonable risks and benefits to the patient and/or the unborn child(tiesha), and based upon the information available at the time of the patient’s examination, I certify that the medical benefits reasonably to be expected from the provision of appropriate medical treatments at another  medical facility outweigh the increasing risks, if any, to the individual’s medical condition, and in the case of labor to the unborn child, from effecting the transfer.    X____________________________________________ DATE 06/02/24        TIME_______      ORIGINAL - SEND TO MEDICAL RECORDS   COPY - SEND WITH PATIENT DURING TRANSFER

## 2024-06-02 NOTE — ASSESSMENT & PLAN NOTE
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

## 2024-06-02 NOTE — H&P
[facility]  H&P  Name: Berto Faria 53 y.o. male I MRN: [id number]  Unit/Bed#: [place] [address] -01 I Date of Admission: 6/2/2024   Date of Service: 6/2/2024 I Hospital Day: 0      Assessment & Plan   * Hyperkalemia  Assessment & Plan  Pt initially presented to [facility] ED secondary to hypotension following his dialysis session today. Pt reports that he did not receive the full dialysis session today secondary to his blood pressure being low. At home, pt states he became diaphoretic with brief episode of right scapular pain. He reports that he then checked his SBP which was in the 70's. He took 7.5mg of midodrine and salt to try to increase his bp without improvement prompting him to come to the [facility] ED  On BMP patient was noted to have K of 5.6 with EKG changes.   EKG demonstrating QTc prolonged to 610 with peak t-waves noted   Pt received calcium gluconate, albuterol, and insulin + dextrose to treat hyperkalemia   [facility] ED discussed case with [facility] ICU provider who deemed patient appropriate for SD2. Pt to be transferred in case additional dialysis session necessary  Repeat EKG pending   Monitor patient on telemetry  Mg, Repeat BMP pending   Nephrology consult pending     Hypotension  Assessment & Plan  Patient reports that normally he takes an additional 5mg of midodrine prior to his dialysis session, but today he did not. He reports that secondary to hypotension, his session was shortened.  At home, he became diaphoretic with brief pain in right scapular region then noted his SBP was in the 70's. Pt states that he took 7.5mg of midodrine and salt in an attempt to raise his blood pressure without improvement prompting him to come to the [facility] [place] ED   Pt bp initially 76/42 on presentation to the ED. Bp labile while in the ED. Initially improved to SBP in the 90's after 12.5 mg albumin. SBP then dropped back to the 70's, improved following 10 mg midodrine.  On arrival to [facility]  bp 77/50, following administration of albumin bp now 93/57  Continue to monitor blood pressures closely   Continue home regimen of 10mg midodrine tid with 5mg midodrine prior to dialysis    Abnormal EKG  Assessment & Plan  EKG in [facility] ED demonstrating peak t-waves and prolonged QTc at 610  Thought to be secondary to hyperkalemia  Pt currently asymptomatic  Repeat EKG pending   Monitor on telemetry  Cardiology consult pending     Elevated troponin  Assessment & Plan  Trop elevated at 117->295, continue to trend  EKG with peaked t-waves and prolonged qtc  Pt denies chest pain, palpitations, nausea, vomiting, or pedal edema. He reports diaphoresis and brief pain in right scapular region during hypotensive episode  If troponin continues to elevate and/or patient develops chest pain would consider initiation of heparin gtt  Monitor on telemetry  Cardiology consult pending     ESRD (end stage renal disease) ([facility])  Assessment & Plan  Lab Results   Component Value Date    EGFR 10 06/01/2024    EGFR 10 02/02/2024    EGFR 10 01/20/2024    CREATININE 5.62 (H) 06/01/2024    CREATININE 5.59 (H) 02/02/2024    CREATININE 5.85 (H) 01/20/2024   Maintained on Tues, Thursday, Saturday dialysis  Pt unable to receive full dialysis session today secondary to hypotension  Nephrology consult pending    Type 1 diabetes mellitus with hyperglycemia ([facility])  Assessment & Plan  Lab Results   Component Value Date    HGBA1C 7.7 (H) 01/07/2024       Recent Labs     06/02/24  0034 06/02/24  0258   POCGLU 576* 532*       Blood Sugar Average: Last 72 hrs:  BG elevated in the 500's while in the [facility] ED. After receiving additional 10 units regular insulin, bg now 461 on arrival.   Bicarb 23, anion gap 15, will order VBG, beta hydroxybutyrate to rule out DKA, however patient does report blood glucose levels typically well controlled at home  Continue home regimen of 10 units Lantus bid  SSI with accucheks      Ischemic cardiomyopathy  Assessment &  "Plan  ECHO 4/24 demonstrating EF 45% \"There is mild global hypokinesis. Diastolic function is mildly abnormal, consistent with grade I (abnormal) relaxation.\"  Improved compared to ECHO from 1/2024 which demonstrated EF 32% and severe global hypokinesis prior to cardiac cath with PCI performed during admission to [facility] 1/2024  Monitor daily weights, I/O  Volume management with HD    Atrial fibrillation (HCC)  Assessment & Plan  New onset on 1/11/2024 during recent admit with conversion to NSR with IV amiodarone   Per cardiology note from 1/25/2024, amiodarone discontinued as patient has remained in NSR since discharge 1/2024  Pt reports he continues to take eliquis    Renal transplant failure and rejection  Assessment & Plan  Continue tacrolimus 2mg qAM, 1mg qPM, and prednisone 5 mg daily  Followed outpatient by nephrology    Chronic diastolic (congestive) heart failure (HCC)  Assessment & Plan  Wt Readings from Last 3 Encounters:   06/01/24 45 kg (99 lb 3.3 oz)   04/26/24 48.5 kg (106 lb 14.8 oz)   02/23/24 48.5 kg (107 lb)     Patient appears euvolemic on exam although CXR demonstrating bilateral pulmonary edema likely secondary to patient unable to receive dialysis yesterday due to hypotension  Monitor daily weights, I/O  Volume management with HD        Coronary artery disease involving native coronary artery  Assessment & Plan  PMHx of MARC to circumflex and prox LAD in 2018   Cardiac cath performed over admission to [facility] 1/08-1/20/2024 s/p PCI of the ostial proximal LAD and ostial circumflex with drug-eluting stents   Continue plavix, eliquis, statin therapy         VTE Pharmacologic Prophylaxis: VTE Score: 2 Low Risk (Score 0-2) - Encourage Ambulation.  Code Status: Level 1 - Full Code   Discussion with family: Patient declined call to .     Anticipated Length of Stay: Patient will be admitted on an inpatient basis with an anticipated length of stay of greater than 2 midnights secondary to " "hyperkalemia, prolonged qt, hypotension.    Total Time Spent on Date of Encounter in care of patient: 75 mins. This time was spent on one or more of the following: performing physical exam; counseling and coordination of care; obtaining or reviewing history; documenting in the medical record; reviewing/ordering tests, medications or procedures; communicating with other healthcare professionals and discussing with patient's family/caregivers.    Chief Complaint: \"I had dialysis today and my blood pressure was low, and it has not really come back up\"    History of Present Illness:  Berto Faria is a 53 y.o. male who presents with hypotension, hyperkalemia, prolonged QTC, and hyperglycemia. Pt reports that yesterday he did not take his midodrine prior to going to dialysis. He then became hypotensive during the session, so he was not able to complete the full session. He reports that at home he then became diaphoretic and developed brief right scapular pain. He also reports feeling slightly lightheaded. He took his blood pressure and noted his SBP to be in the 70's. He then took an additional 7.5mg to see if this would improve his pressure along with eating salt. When this did not work, he presented to the [facility] ED. Pt reports that he is currently asymptomatic. He denies ever having any chest pain, palpitations, n/v, pedal edema, headache, or neuro symptoms. It was recommended patient be transferred to [facility] in case patient requires additional dialysis. Pt was determined by the [facility] critical care service to be appropriate for SD2.    Review of Systems:  Review of Systems   Constitutional:  Positive for diaphoresis and fatigue. Negative for appetite change, chills and fever.   HENT:  Negative for congestion, rhinorrhea and sore throat.    Eyes:  Negative for photophobia and visual disturbance.   Respiratory:  Negative for cough, shortness of breath and wheezing.    Cardiovascular:  Negative for chest pain, " palpitations and leg swelling.   Gastrointestinal:  Negative for abdominal distention, abdominal pain, blood in stool, constipation, diarrhea, nausea and vomiting.   Genitourinary:  Negative for dysuria and hematuria.   Musculoskeletal:  Positive for back pain (brief, right scapular). Negative for arthralgias, myalgias and neck stiffness.   Skin:  Negative for color change and rash.   Neurological:  Positive for light-headedness. Negative for dizziness, tremors, seizures, syncope, facial asymmetry, speech difficulty, weakness, numbness and headaches.   Psychiatric/Behavioral:  Negative for agitation, behavioral problems and confusion. The patient is not nervous/anxious.    All other systems reviewed and are negative.      Past Medical and Surgical History:   Past Medical History:   Diagnosis Date    Bacteremia 12/21/2018    CAD (coronary artery disease)     s/p MARC to LCx, pLAD 2/2018    Cardiac arrest ([facility])     Chronic kidney disease     Diabetes mellitus type 1 ([facility])     Dialysis patient ([facility])     Access in right chest    GI bleed     Hyperlipidemia     Hypertension     Infection at site of external fixator pin (HCC)     MI (myocardial infarction) ([facility])     Myocardial infarction ([facility])     Pneumonia     Last Assessed [id number]    Renal failure     Renal transplant, status post 07/21/2007       Past Surgical History:   Procedure Laterality Date    AMPUTATION Right 02/2019    aboce knee    ANKLE FRACTURE SURGERY      ANKLE HARDWARE REMOVAL Right 7/31/2017    Procedure: REMOVAL HARDWARE ANKLE;  Surgeon: Alvin Leon MD;  Location: [facility];  Service: Orthopedics    ANKLE HARDWARE REMOVAL Right 8/17/2017    Procedure: TIBIA FAILED HARDWARE REMOVAL;  Surgeon: Alvin Leon MD;  Location: [facility];  Service: Orthopedics    CARDIAC CATHETERIZATION Left 1/12/2024    Procedure: Cardiac Left Heart Cath;  Surgeon: Spencer Bloom MD;  Location:  CARDIAC CATH LAB;  Service: Cardiology    CARDIAC CATHETERIZATION N/A  1/18/2024    Procedure: Cardiac pci;  Surgeon: Spencer Bloom MD;  Location: [facility] CARDIAC CATH LAB;  Service: Cardiology    CARDIAC CATHETERIZATION N/A 1/18/2024    Procedure: Cardiac Impella Insertion;  Surgeon: Spencer Bloom MD;  Location:  CARDIAC CATH LAB;  Service: Cardiology    CLOSED REDUCTION ANKLE Right 7/3/2017    Procedure: CLOSED REDUCTION DISTAL TIB-FIB AND CASTING VS;  Surgeon: Alvin Leon MD;  Location: [facility];  Service: Orthopedics    CORONARY ANGIOPLASTY WITH STENT PLACEMENT  02/2018    CAD s/p MARC to LCx, pLAD    ESOPHAGOGASTRODUODENOSCOPY N/A 12/20/2018    Procedure: ESOPHAGOGASTRODUODENOSCOPY (EGD) in ICU;  Surgeon: Galileo Wise IV, MD;  Location: [facility] GI LAB;  Service: Gastroenterology    EYE SURGERY      FRACTURE SURGERY      ORIF Rt Ankle    GLUTAMIC ACID DECARBOXYLASE (HISTORICAL)      IR AV FISTULAGRAM/GRAFTOGRAM  4/16/2020    IR AV FISTULAGRAM/GRAFTOGRAM  6/23/2023    IR AV FISTULAGRAM/GRAFTOGRAM  2/12/2024    IR PICC LINE  8/20/2018    IR TEMPORARY DIALYSIS CATHETER CHECK/CHANGE/REPOSITION  1/8/2024    IR TUNNELED DIALYSIS CATHETER CHECK/CHANGE/REPOSITION/ANGIOPLASTY  1/8/2020    IR TUNNELED DIALYSIS CATHETER PLACEMENT  10/21/2019    IR TUNNELED DIALYSIS CATHETER REMOVAL  5/29/2020    IR VENOUS LINE REMOVAL  10/5/2018    LEG AMPUTATION Right 02/01/2019    Above the knee    NEPHRECTOMY TRANSPLANTED ORGAN      GA ARTERIOVENOUS ANASTOMOSIS OPEN DIRECT Right 2/11/2020    Procedure: CREATION FISTULA ARTERIOVENOUS (AV) right upper extremity;  Surgeon: Carlos Medina MD;  Location:  MAIN OR;  Service: Vascular    GA OPEN TREATMENT FRACTURE DISTAL TIBIA FIBULA Right 7/3/2017    Procedure: OPEN REDUCTION W/ INTERNAL FIXATION (ORIF);  Surgeon: Alvin Leon MD;  Location: [facility];  Service: Orthopedics    TOE AMPUTATION Right 10/27/2016    Procedure: AMPUTATION TOE;  Surgeon: Krishna Ruiz DPM;  Location: [facility];  Service:        Meds/Allergies:  Prior to Admission medications   "  Medication Sig Start Date End Date Taking? Authorizing Provider   amiodarone 200 mg tablet Take 1 tablet (200 mg total) by mouth daily with breakfast 2/15/24   GAVINO Ardon   apixaban (ELIQUIS) 2.5 mg Take 1 tablet (2.5 mg total) by mouth 2 (two) times a day 2/15/24   GAVINO Ardon   aspirin 81 mg chewable tablet Chew 81 mg every morning   Patient not taking: Reported on 6/2/2024    Historical Provider, MD   atorvastatin (LIPITOR) 80 mg tablet Take 1 tablet (80 mg total) by mouth daily 10/24/23   GAVINO Ardon   B Complex-C-Folic Acid (triphrocaps) 1 MG CAPS Take 1 capsule (1 mg total) by mouth daily 3/13/24   Teo Stevenson DO   calcitriol (ROCALTROL) 0.5 MCG capsule Take 1 capsule (0.5 mcg total) by mouth 3 (three) times a week 1/23/24   Mulugeta Myrick MD   cinacalcet (SENSIPAR) 30 mg tablet Take 1 tablet (30 mg total) by mouth daily 3/13/24   Teo Stevenson DO   clopidogrel (PLAVIX) 75 mg tablet Take 1 tablet (75 mg total) by mouth daily 2/15/24   GAVINO Ardon   Glucagon, rDNA, (Glucagon Emergency) 1 MG KIT INJECT 1MG SUBCUTANEOUSLY ONCE AS NEEDED FOR LOW BLOOD SUGAR FOR UP TO 1 DOSE 10/3/22   Dalton Anderson DO   glucose blood (Contour Next Test) test strip Use as instructed 6 times daily 12/12/23   Teo Stevenson DO   insulin aspart (NovoLOG) 100 units/mL injection 8 Units by Subcutaneous Insulin Pump route 2 (two) times a day with meals 1/20/24   Mulugeta Myrick MD   insulin glargine (Semglee) 100 units/mL subcutaneous injection Inject 10 Units under the skin every 12 (twelve) hours 5/14/24   GAVINO Ardon   Insulin Syringe-Needle U-100 31G X 15/64\" 0.5 ML MISC Use 3 (three) times a day 3/10/22   GAVINO Ardon   midodrine (PROAMATINE) 10 MG tablet Take 1 tablet (10 mg total) by mouth 3 (three) times a day before meals 4/2/24   GAVINO Ardon   midodrine (PROAMATINE) 5 mg tablet Take 1 tablet (5 mg total) by mouth 3 (three) times a week Before dialysis  Patient " "taking differently: Take 5 mg by mouth 3 (three) times a week Before dialysis Evelyn mclaughlin Sat 1/22/24   Mulugeta Myrick MD   multivitamin (THERAGRAN) TABS Take 1 tablet by mouth daily    Historical Provider, MD   pantoprazole (PROTONIX) 40 mg tablet Take 1 tablet (40 mg total) by mouth daily 5/7/24   GAVINO Ardon   predniSONE 5 mg tablet Take 1 tablet (5 mg total) by mouth daily 10/24/23   GAVINO Ardon   sevelamer carbonate (RENVELA) 800 mg tablet Take 1 tablet (800 mg total) by mouth 3 (three) times a day with meals 5/7/24   GAVINO Ardon   tacrolimus (PROGRAF) 1 mg capsule 2 caps in the morning and 1 cap in the evening 9/26/23   Teo Stevenson DO     I have reviewed home medications with patient personally.    Allergies: No Known Allergies    Social History:  Marital Status: /Civil Union   Patient Pre-hospital Living Situation: Home  Patient Pre-hospital Level of Mobility: walks with walker  Patient Pre-hospital Diet Restrictions: diabetic, renal  Substance Use History:   Social History     Substance and Sexual Activity   Alcohol Use Not Currently    Alcohol/week: 0.0 standard drinks of alcohol     Social History     Tobacco Use   Smoking Status Never   Smokeless Tobacco Never     Social History     Substance and Sexual Activity   Drug Use Never       Family History:  Family History   Problem Relation Age of Onset    Diabetes Brother     Coronary artery disease Mother     No Known Problems Father        Physical Exam:     Vitals:   Blood Pressure: 93/57 (06/02/24 0610)  Pulse: 84 (06/02/24 0610)  Temperature: 97.5 °F (36.4 °C) (06/02/24 0508)  Respirations: 19 (06/02/24 0508)  Height: 5' 4\" (162.6 cm) (06/02/24 0525)  Weight - Scale: 44.7 kg (98 lb 8.7 oz) (06/02/24 0600)  SpO2: 100 % (06/02/24 0610)    Physical Exam  Vitals and nursing note reviewed.   Constitutional:       General: He is not in acute distress.     Appearance: He is well-developed.   HENT:      Head: Normocephalic and " atraumatic.      Nose: Nose normal. No congestion.      Mouth/Throat:      Mouth: Mucous membranes are dry.      Pharynx: Oropharynx is clear.   Eyes:      Conjunctiva/sclera: Conjunctivae normal.   Cardiovascular:      Rate and Rhythm: Normal rate and regular rhythm.      Heart sounds: Normal heart sounds. No murmur heard.     No friction rub. No gallop.   Pulmonary:      Effort: Pulmonary effort is normal. No respiratory distress.      Breath sounds: Normal breath sounds. No wheezing, rhonchi or rales.   Abdominal:      General: Bowel sounds are normal. There is no distension.      Palpations: Abdomen is soft.      Tenderness: There is no abdominal tenderness.   Musculoskeletal:      Cervical back: Neck supple.      Right lower leg: No edema.      Left lower leg: No edema.   Skin:     General: Skin is warm and dry.      Capillary Refill: Capillary refill takes less than 2 seconds.   Neurological:      General: No focal deficit present.      Mental Status: He is alert and oriented to person, place, and time. Mental status is at baseline.   Psychiatric:         Mood and Affect: Mood normal.         Behavior: Behavior normal.          Additional Data:     Lab Results:  Results from last 7 days   Lab Units 06/01/24  2314   WBC Thousand/uL 10.51*   HEMOGLOBIN g/dL 13.4   HEMATOCRIT % 43.5   PLATELETS Thousands/uL 273   SEGS PCT % 83*   LYMPHO PCT % 10*   MONO PCT % 5   EOS PCT % 1     Results from last 7 days   Lab Units 06/01/24  2314   SODIUM mmol/L 130*   POTASSIUM mmol/L 5.6*   CHLORIDE mmol/L 92*   CO2 mmol/L 23   BUN mg/dL 52*   CREATININE mg/dL 5.62*   ANION GAP mmol/L 15*   CALCIUM mg/dL 8.5   GLUCOSE RANDOM mg/dL 398*         Results from last 7 days   Lab Units 06/02/24  0516 06/02/24  0258 06/02/24  0034   POC GLUCOSE mg/dl 461* 532* 576*               Lines/Drains:  Invasive Devices       Peripheral Intravenous Line  Duration             Peripheral IV 06/01/24 Left;Proximal;Ventral (anterior) Forearm 1 day     Peripheral IV 06/01/24 Dorsal (posterior);Left Hand <1 day              Line  Duration             Hemodialysis AV Fistula Right Upper arm -- days                        Imaging: Personally reviewed the following imaging: chest xray  No orders to display       EKG and Other Studies Reviewed on Admission:   EKG: peak t-waves, prolonged Qtc, known LBBB    ** Please Note: This note has been constructed using a voice recognition system. **

## 2024-06-02 NOTE — PROGRESS NOTES
Critical care attending progress note    We were informed by internal medicine attending physician around 0900 requesting ICU evaluation and that this patient was hypotensive and in respiratory distress requiring BiPAP.    On chart evaluation, this patient was admitted overnight to the stepdown 2 service, transferred from [facility] for hyperkalemia and potential need for additional dialysis.  Hyperkalemia was treated medically overnight with potassium of 5.6 at 11 PM and 3.3 at 6:40 AM.  The patient apparently developed right-sided scapular pain after admission to [facility].  Serum troponin was acquired and it increased to 5420 this morning at 6:40 AM.  It was reported that his EKG demonstrated ST elevations in leads V1 and V2 and ST depressions in V5 and V6.  Apparently these EKG changes were not observed in the [facility] EKG prior to transfer.  Cardiology was immediately involved by the internal medicine attending and recommendation was to transfer to [facility] for Cath Lab with CT surgery support due to complexity of the case    However, the patient was transferred to the ICU for stabilization due to hypotension and respiratory distress.  Upon arrival to the ICU the patient was profoundly hypotensive with blood pressure 50/30 and severely hypoxic, and respiratory distress while on BiPAP.  Maximum dose norepinephrine was started,  IV epinephrine injected, and patient was intubated successfully by critical care CASIMIRO on first attempt.  Unfortunately the patient's blood pressure did not improve and he went into cardiac arrest.    ACLS was performed for at least 30 minutes starting at 9:19 AM concluding at 9:49 AM.  See code documentation for full medication list.  Cardiac rhythm during the code was mostly PEA with occasional ventricular fibrillation.  The patient received multiple doses of epinephrine, amiodarone, sodium bicarbonate, Ca chloride, and 15 mg of tPA to treat the acute coronary syndrome.  Unfortunately the  patient did not achieve ROSC for any prolonged periods of time.  The patient's wife was contacted throughout the code and it was agreed between her and myself to conclude the code at 9:49 AM and allow the patient to pass away naturally.  Patient's time of death is 9:52 AM    Critical care time 45 minutes

## 2024-06-02 NOTE — ASSESSMENT & PLAN NOTE
Pt initially presented to [facility] ED secondary to hypotension following his dialysis session today. Pt reports that he did not receive the full dialysis session today secondary to his blood pressure being low. At home, pt states he became diaphoretic with brief episode of right scapular pain. He reports that he then checked his SBP which was in the 70's. He took 7.5mg of midodrine and salt to try to increase his bp without improvement prompting him to come to the [facility] ED  On BMP patient was noted to have K of 5.6 with EKG changes.   EKG demonstrating QTc prolonged to 610 with peak t-waves noted   Pt received calcium gluconate, albuterol, and insulin + dextrose to treat hyperkalemia   [facility] ED discussed case with [facility] ICU provider who deemed patient appropriate for SD2. Pt to be transferred in case additional dialysis session necessary  Repeat EKG pending   Monitor patient on telemetry  Mg, Repeat BMP pending   Nephrology consult pending

## 2024-06-02 NOTE — ASSESSMENT & PLAN NOTE
New onset on 1/11/2024 during recent admit with conversion to NSR with IV amiodarone   Per cardiology note from 1/25/2024, amiodarone discontinued as patient has remained in NSR since discharge 1/2024  Pt reports he continues to take eliquis

## 2024-06-02 NOTE — ED ATTENDING ATTESTATION
Final Diagnosis:  1. Hyperkalemia    2. Hypotension    3. Hyponatremia    4. EKG abnormalities      ED Course as of 06/02/24 1628   Sat Jun 01, 2024   2325 Hemoglobin: 13.4       I, Ruslan Merlos MD, saw and evaluated the patient. All available labs and X-rays were ordered by me or the resident and have been reviewed by myself. I discussed the patient with the resident / non-physician and agree with the resident's / non-physician practitioner's findings and plan as documented in the resident's / non-physician practicitioner's note, except where noted.   At this point, I agree with the current assessment done in the ED.   I was present during key portions of all procedures performed unless otherwise stated.     Chief Complaint   Patient presents with    Hypotension     Pt had dialysis today pt states his b/p is low      This is a presenting for evaluation of hypotension.  The patient states that he is noticed that his blood pressure was low today.  He states that he does have a history of this.  Review of prior notes over the past couple note months show that his blood pressure range from anywhere from 100 systolic to approximately 150.  He has been taking all off of all antihypertensive he medicines.  Patient states that he usually takes extra midodrine on the days that he has dialysis.  He was persistently hypotensive during dialysis so they stopped his session early.  He went home and took midodrine at approximately 1500.  His blood pressure then can was consistently low so he presents now for further evaluation.  Patient denies any shortness of breath, chest pain, abdominal pain, nausea vomiting.  He states that he is otherwise compliant with his medications.    Review records show that the patient does have a complex medical history.  End-stage renal disease on dialysis, prior kidney transplant, multiple cardiac issues with multivessel disease.  Cardiology was looking to start other medications however his  blood pressure would not support these.    PMH:   has a past medical history of Bacteremia (12/21/2018), CAD (coronary artery disease), Cardiac arrest (HCC), Chronic kidney disease, Diabetes mellitus type 1 (HCC), Dialysis patient (HCC), GI bleed, Hyperlipidemia, Hypertension, Infection at site of external fixator pin (HCC), MI (myocardial infarction) (HCC), Myocardial infarction (HCC), Pneumonia, Renal failure, and Renal transplant, status post (07/21/2007).    PSH:   has a past surgical history that includes Nephrectomy transplanted organ; GLUTAMIC ACID DECARBOXYLASE (HISTORICAL); Eye surgery; Toe amputation (Right, 10/27/2016); pr open treatment fracture distal tibia fibula (Right, 7/3/2017); CLOSED REDUCTION ANKLE (Right, 7/3/2017); Ankle fracture surgery; Ankle hardware removal (Right, 7/31/2017); Ankle hardware removal (Right, 8/17/2017); Fracture surgery; IR PICC line (8/20/2018); IR venous line removal (10/5/2018); Esophagogastroduodenoscopy (N/A, 12/20/2018); Leg amputation (Right, 02/01/2019); IR tunneled dialysis catheter placement (10/21/2019); Coronary angioplasty with stent (02/2018); IR tunneled dialysis catheter check/change/reposition/angioplasty (1/8/2020); Amputation (Right, 02/2019); pr arteriovenous anastomosis open direct (Right, 2/11/2020); IR AV fistulagram/graftogram (4/16/2020); IR tunneled dialysis catheter removal (5/29/2020); IR AV fistulagram/graftogram (6/23/2023); IR temporary dialysis catheter check/change/reposition (1/8/2024); Cardiac catheterization (Left, 1/12/2024); Cardiac catheterization (N/A, 1/18/2024); Cardiac catheterization (N/A, 1/18/2024); and IR AV fistulagram/graftogram (2/12/2024).    CriticalCare Time    Date/Time: 6/2/2024 4:27 PM    Performed by: Ruslan Merlos MD  Authorized by: Ruslan Merlos MD    Critical care provider statement:     Critical care time (minutes):  37    Critical care was necessary to treat or prevent imminent or life-threatening  deterioration of the following conditions:  Renal failure and shock    Critical care was time spent personally by me on the following activities:  Obtaining history from patient or surrogate, development of treatment plan with patient or surrogate, discussions with consultants, evaluation of patient's response to treatment, examination of patient, review of old charts, re-evaluation of patient's condition, ordering and review of laboratory studies and ordering and performing treatments and interventions       Social:  Social History     Substance and Sexual Activity   Alcohol Use Not Currently    Alcohol/week: 0.0 standard drinks of alcohol     Social History     Tobacco Use   Smoking Status Never   Smokeless Tobacco Never     Social History     Substance and Sexual Activity   Drug Use Never     PE:  Vitals:    06/02/24 0100 06/02/24 0200 06/02/24 0215 06/02/24 0330   BP: (!) 74/52 90/54 91/54 (!) 86/55   BP Location: Left arm  Left arm    Pulse: 95 82 92 94   Resp: (!) 33 17 22 (!) 35   Temp:       TempSrc:       SpO2: 90% 98% 96% 95%   Weight:           A:    Unless otherwise specified above:     General: VS reviewed  Appears in NAD     Head: Normocephalic, atraumatic     CV: No pallor noted  Lungs:   No respiratory distress     Abdomen:  Soft, non-tender, non-distended          Skin: No obvious rash.     Neuro: Awake, alert, GCS15, CN II-XII grossly intact. Speaking in full sentences.   Motor grossly intact.     Psychiatric/Behavioral: Appropriate mood and affect   Exam: deferred    P:  -Patient with amputation on right side.  He appears nondistressed in the room.  No actual work of breathing.  No obvious signs of fluid overload on exam.  - I discussed management options with him.  Given his history of dialysis believe it would be beneficial to do an EKG, CBC, BMP.  Evaluate for anemia, electrolyte disturbances.  Will provide troponin as well to evaluate for ACS.  I discussed these with patient.  He tells me  that as long as his blood work is okay he would like to be discharged home as he often has episodes of hypotension and just wants to make sure everything else is all right  - Patient's EKG demonstrates changes from his prior.  Patient also has an elevated potassium at 5.6.  I would not believe that this fully explains his EKG changes but it could be part of it.  Troponins elevated at 117.  - Patient is hypotensive here with blood pressures ranging in the 80s systolic to 100 systolic.  Was given some albumin with a transient improvement in blood pressure.  Wanted to defer giving large amounts of fluid as patient does have a history of fluid overload in the past.  Currently not having any other respiratory issues so we will hold off on this for now.  - Patient was reviewed with nephrology.  Unable to get dialysis here over the weekend.  They suggested that he could be admitted here and dialysis done on Monday otherwise if we felt that he would need dialysis sooner he would likely need to be transferred.  - Given patient's hypotensive and changes on EKG as well as hyperkalemia and end-stage renal disease I believe the patient will be better suited at a larger [place] in case he needed emergent dialysis.  Patient was reviewed with critical care at [facility].  Okay to send to the medical team.  Reviewed with hospitalist team at [facility] as well.  He will be transferred to their care.  At time of signout patient was awaiting transfer to [facility].  - 13 point ROS was performed and all are normal unless stated in the history above.   - Nursing note reviewed. Vitals reviewed.   - Orders placed by myself and/or advanced practitioner / resident.    - Previous chart was reviewed  - No language barrier.   - History obtained from patient.   - There are no limitations to the history obtained.     Unless otherwise specified:  CC is exclusive from any separately billable procedures  CC is exclusive of treating other  patients  CC is exclusive of teaching time     Code Status: Prior  Advance Directive and Living Will:      Power of :    POLST:      Medications   albumin human (FLEXBUMIN) 25 % injection 12.5 g (0 g Intravenous Stopped 6/2/24 0028)   calcium gluconate 1 g in sodium chloride 0.9% 50 mL (premix) (0 g Intravenous Stopped 6/2/24 0210)   insulin regular (HumuLIN R,NovoLIN R) injection 10 Units (10 Units Intravenous Given 6/2/24 0013)   dextrose 50 % IV solution 25 mL (25 mL Intravenous Given 6/2/24 0016)   albuterol inhalation solution 15 mg (15 mg Nebulization Given 6/2/24 0025)   midodrine (PROAMATINE) tablet 10 mg (10 mg Oral Given 6/2/24 0209)     XR chest 1 view portable   Final Result      Severe pulmonary edema with small pleural effusions.            Workstation performed: [id number]           Orders Placed This Encounter   Procedures    ED ECG Documentation Only    Critical Care    XR chest 1 view portable    CBC and differential    Basic metabolic panel    HS Troponin 0hr (reflex protocol)    HS Troponin I 2hr    Fingerstick Glucose (POCT)    POCT glucose    ECG 12 lead    ECG 12 lead    ECG 12 lead    Transfer to other facility     Labs Reviewed   CBC AND DIFFERENTIAL - Abnormal       Result Value Ref Range Status    WBC 10.51 (*) 4.31 - 10.16 Thousand/uL Final    RBC 4.47  3.88 - 5.62 Million/uL Final    Hemoglobin 13.4  12.0 - 17.0 g/dL Final    Hematocrit 43.5  36.5 - 49.3 % Final    MCV 97  82 - 98 fL Final    MCH 30.0  26.8 - 34.3 pg Final    MCHC 30.8 (*) 31.4 - 37.4 g/dL Final    RDW 17.0 (*) 11.6 - 15.1 % Final    MPV 8.8 (*) 8.9 - 12.7 fL Final    Platelets 273  149 - 390 Thousands/uL Final    nRBC 0  /100 WBCs Final    Segmented % 83 (*) 43 - 75 % Final    Immature Grans % 0  0 - 2 % Final    Lymphocytes % 10 (*) 14 - 44 % Final    Monocytes % 5  4 - 12 % Final    Eosinophils Relative 1  0 - 6 % Final    Basophils Relative 1  0 - 1 % Final    Absolute Neutrophils 8.79 (*) 1.85 - 7.62  "Thousands/µL Final    Absolute Immature Grans 0.02  0.00 - 0.20 Thousand/uL Final    Absolute Lymphocytes 1.04  0.60 - 4.47 Thousands/µL Final    Absolute Monocytes 0.47  0.17 - 1.22 Thousand/µL Final    Eosinophils Absolute 0.10  0.00 - 0.61 Thousand/µL Final    Basophils Absolute 0.09  0.00 - 0.10 Thousands/µL Final   BASIC METABOLIC PANEL - Abnormal    Sodium 130 (*) 135 - 147 mmol/L Final    Potassium 5.6 (*) 3.5 - 5.3 mmol/L Final    Chloride 92 (*) 96 - 108 mmol/L Final    CO2 23  21 - 32 mmol/L Final    ANION GAP 15 (*) 4 - 13 mmol/L Final    BUN 52 (*) 5 - 25 mg/dL Final    Creatinine 5.62 (*) 0.60 - 1.30 mg/dL Final    Comment: Standardized to IDMS reference method    Glucose 398 (*) 65 - 140 mg/dL Final    Comment: If the patient is fasting, the ADA then defines impaired fasting glucose as > 100 mg/dL and diabetes as > or equal to 123 mg/dL.    Calcium 8.5  8.4 - 10.2 mg/dL Final    eGFR 10  ml/min/1.73sq m Final    Narrative:     National Kidney Disease Foundation guidelines for Chronic Kidney Disease (CKD):     Stage 1 with normal or high GFR (GFR > 90 mL/min/1.73 square meters)    Stage 2 Mild CKD (GFR = 60-89 mL/min/1.73 square meters)    Stage 3A Moderate CKD (GFR = 45-59 mL/min/1.73 square meters)    Stage 3B Moderate CKD (GFR = 30-44 mL/min/1.73 square meters)    Stage 4 Severe CKD (GFR = 15-29 mL/min/1.73 square meters)    Stage 5 End Stage CKD (GFR <15 mL/min/1.73 square meters)  Note: GFR calculation is accurate only with a steady state creatinine   HS TROPONIN I 0HR - Abnormal    hs TnI 0hr 117 (*) \"Refer to ACS Flowchart\"- see link ng/L Final    Comment:                                              Initial (time 0) result  If >=50 ng/L, Myocardial injury suggested ;  Type of myocardial injury and treatment strategy  to be determined.  If 5-49 ng/L, a delta result at 2 hours and or 4 hours will be needed to further evaluate.  If <4 ng/L, and chest pain has been >3 hours since onset, patient may " "qualify for discharge based on the HEART score in the ED.  If <5 ng/L and <3hours since onset of chest pain, a delta result at 2 hours will be needed to further evaluate.    HS Troponin 99th Percentile URL of a Health Population=12 ng/L with a 95% Confidence Interval of 8-18 ng/L.    Second Troponin (time 2 hours)  If calculated delta >= 20 ng/L,  Myocardial injury suggested ; Type of myocardial injury and treatment strategy to be determined.  If 5-49 ng/L and the calculated delta is 5-19 ng/L, consult medical service for evaluation.  Continue evaluation for ischemia on ecg and other possible etiology and repeat hs troponin at 4 hours.  If delta is <5 ng/L at 2 hours, consider discharge based on risk stratification via the HEART score (if in ED), or SANJUANITA risk score in IP/Observation.    HS Troponin 99th Percentile URL of a Health Population=12 ng/L with a 95% Confidence Interval of 8-18 ng/L.   HS TROPONIN I 2HR - Abnormal    hs TnI 2hr 295 (*) \"Refer to ACS Flowchart\"- see link ng/L Final    Comment:                                              Initial (time 0) result  If >=50 ng/L, Myocardial injury suggested ;  Type of myocardial injury and treatment strategy  to be determined.  If 5-49 ng/L, a delta result at 2 hours and or 4 hours will be needed to further evaluate.  If <4 ng/L, and chest pain has been >3 hours since onset, patient may qualify for discharge based on the HEART score in the ED.  If <5 ng/L and <3hours since onset of chest pain, a delta result at 2 hours will be needed to further evaluate.    HS Troponin 99th Percentile URL of a Health Population=12 ng/L with a 95% Confidence Interval of 8-18 ng/L.    Second Troponin (time 2 hours)  If calculated delta >= 20 ng/L,  Myocardial injury suggested ; Type of myocardial injury and treatment strategy to be determined.  If 5-49 ng/L and the calculated delta is 5-19 ng/L, consult medical service for evaluation.  Continue evaluation for ischemia on ecg and " other possible etiology and repeat hs troponin at 4 hours.  If delta is <5 ng/L at 2 hours, consider discharge based on risk stratification via the HEART score (if in ED), or SANJUANITA risk score in IP/Observation.    HS Troponin 99th Percentile URL of a Health Population=12 ng/L with a 95% Confidence Interval of 8-18 ng/L.    Delta 2hr hsTnI 178 (*) <20 ng/L Final   POCT GLUCOSE - Abnormal    POC Glucose 576 (*) 65 - 140 mg/dl Final    Comment: CRITICAL VALUE NOTED-   POCT GLUCOSE - Abnormal    POC Glucose 532 (*) 65 - 140 mg/dl Final    Comment: CRITICAL VALUE NOTED-     Time reflects when diagnosis was documented in both MDM as applicable and the Disposition within this note       Time User Action Codes Description Comment    6/1/2024 11:53 PM Tony Ambrose [E87.5] Hyperkalemia     6/1/2024 11:53 PM Tony Ambrose [I95.9] Hypotension     6/2/2024 12:19 AM Tony Ambrose [E87.1] Hyponatremia     6/2/2024 12:27 AM Tony Ambrose [R94.31] EKG abnormalities           ED Disposition       ED Disposition   Transfer to Another Facility-In Network    Condition   --    Date/Time   Sun Jun 2, 2024  1:07 AM    Comment   Berto RANGEL Giana should be transferred out to [facility].               MD Documentation      Flowsheet Row Most Recent Value   Patient Condition The patient has been stabilized such that within reasonable medical probability, no material deterioration of the patient condition or the condition of the unborn child(tiesha) is likely to result from the transfer   Reason for Transfer Level of Care needed not available at this facility   Benefits of Transfer Specialized equipment and/or services available at the receiving facility (Include comment)________________________  [Dialysis]   Risks of Transfer Potential deterioration of medical condition, Loss of IV, Possible worsening of condition or death during transfer, Potential for delay in receiving treatment, Increased discomfort during transfer   Accepting  "Physician DR. DANIEL   Accepting Facility Name, [facility]   Sending MD DR. COCHRAN   Provider Certification General risk, such as traffic hazards, adverse weather conditions, rough terrain or turbulence, possible failure of equipment (including vehicle or aircraft), or consequences of actions of persons outside the control of the transport personnel, Unanticipated needs of medical equipment and personnel during transport, Risk of worsening condition, The possibility of a transport vehicle being unavailable          RN Documentation      Flowsheet Row Most Recent Value   Accepting Facility Name, [facility]   Bed Assignment UNIT SD2  ROOM 220/RM1   Report Given to Katie          Follow-up Information    None       Discharge Medication List as of 6/2/2024  4:21 AM        CONTINUE these medications which have NOT CHANGED    Details   Insulin Syringe-Needle U-100 31G X 15/64\" 0.5 ML MISC Use 3 (three) times a day, Starting Thu 3/10/2022, Normal      amiodarone 200 mg tablet Take 1 tablet (200 mg total) by mouth daily with breakfast, Starting Thu 2/15/2024, Normal      apixaban (ELIQUIS) 2.5 mg Take 1 tablet (2.5 mg total) by mouth 2 (two) times a day, Starting Thu 2/15/2024, Normal      aspirin 81 mg chewable tablet Chew 81 mg every morning , Historical Med      atorvastatin (LIPITOR) 80 mg tablet Take 1 tablet (80 mg total) by mouth daily, Starting Tue 10/24/2023, Normal      B Complex-C-Folic Acid (triphrocaps) 1 MG CAPS Take 1 capsule (1 mg total) by mouth daily, Starting Wed 3/13/2024, Normal      calcitriol (ROCALTROL) 0.5 MCG capsule Take 1 capsule (0.5 mcg total) by mouth 3 (three) times a week, Starting Tue 1/23/2024, Normal      cinacalcet (SENSIPAR) 30 mg tablet Take 1 tablet (30 mg total) by mouth daily, Starting Wed 3/13/2024, Normal      clopidogrel (PLAVIX) 75 mg tablet Take 1 tablet (75 mg total) by mouth daily, Starting Thu 2/15/2024, Normal      Glucagon, rDNA, (Glucagon Emergency) 1 MG " "KIT INJECT 1MG SUBCUTANEOUSLY ONCE AS NEEDED FOR LOW BLOOD SUGAR FOR UP TO 1 DOSE, Normal      glucose blood (Contour Next Test) test strip Use as instructed 6 times daily, Normal      insulin aspart (NovoLOG) 100 units/mL injection 8 Units by Subcutaneous Insulin Pump route 2 (two) times a day with meals, Starting Sat 1/20/2024, No Print      insulin glargine (Semglee) 100 units/mL subcutaneous injection Inject 10 Units under the skin every 12 (twelve) hours, Starting Tue 5/14/2024, Normal      !! midodrine (PROAMATINE) 10 MG tablet Take 1 tablet (10 mg total) by mouth 3 (three) times a day before meals, Starting Tue 4/2/2024, Normal      !! midodrine (PROAMATINE) 5 mg tablet Take 1 tablet (5 mg total) by mouth 3 (three) times a week Before dialysis, Starting Mon 1/22/2024, Normal      multivitamin (THERAGRAN) TABS Take 1 tablet by mouth daily, Historical Med      pantoprazole (PROTONIX) 40 mg tablet Take 1 tablet (40 mg total) by mouth daily, Starting Tue 5/7/2024, Normal      predniSONE 5 mg tablet Take 1 tablet (5 mg total) by mouth daily, Starting Tue 10/24/2023, Normal      sevelamer carbonate (RENVELA) 800 mg tablet Take 1 tablet (800 mg total) by mouth 3 (three) times a day with meals, Starting Tue 5/7/2024, Normal      tacrolimus (PROGRAF) 1 mg capsule 2 caps in the morning and 1 cap in the evening, Normal       !! - Potential duplicate medications found. Please discuss with provider.        No discharge procedures on file.  Prior to Admission Medications   Prescriptions Last Dose Informant Patient Reported? Taking?   Insulin Syringe-Needle U-100 31G X 15/64\" 0.5 ML MISC   No No   Sig: Use 3 (three) times a day      Facility-Administered Medications: None       Portions of the record may have been created with voice recognition software. Occasional wrong word or \"sound a like\" substitutions may have occurred due to the inherent limitations of voice recognition software. Read the chart carefully and " recognize, using context, where substitutions have occurred.    Electronically signed by:  Ruslan Merlos

## 2024-06-02 NOTE — ED NOTES
Pt left via [place] ambulance in stable condition. Transferred to [facility], room bed 220. Report called to Katie [phone].      Viridiana Delgado RN  06/02/24 4094

## 2024-06-02 NOTE — ASSESSMENT & PLAN NOTE
Patient had new onset atrial fibrillation in January 2024.  He was discharged on Eliquis 2.5 mg twice daily and amiodarone 200 mg daily.  On admission the amiodarone was held.

## 2024-06-02 NOTE — ASSESSMENT & PLAN NOTE
EKG in 's ED demonstrating peak t-waves and prolonged QTc at 610  Thought to be secondary to hyperkalemia  Pt currently asymptomatic  Repeat EKG pending   Monitor on telemetry  Cardiology consult pending

## 2024-06-02 NOTE — DEATH NOTE
INPATIENT DEATH NOTE  Berto Faria 53 y.o. male MRN: [id number]  Unit/Bed#: ICU 10 Encounter: [id number]    Date, Time and Cause of Death    Date of Death: 24  Time of Death:  9:52 AM  Preliminary Cause of Death: Acute myocardial infarction  Entered by: Dr. Foreign Banks[DP1.1]       Attribution       DP1.1 Foreign Banks DO 24 10:09             Patient's Information  Pronounced by: Dr. Hernandez  Did the patient's death occur in the ED?: No  Did the patient's death occur in the OR?: No  Did the patient's death occur less than 10 days post-op?: No  Did the patient's death occur within 24 hours of admission?: Yes  Was code status DNR at the time of death?: No    PHYSICAL EXAM:  Carotid pulse absent    Medical Examiner notification criteria:  Patient  within 24 hours of arrival to hospital   Medical Examiner's office notified?:  Yes   Medical Examiner accepted case?:    Name of Medical Examiner:     Family Notification  Was the family notified?: Yes  Date Notified: 24  Time Notified: 952  Notified by: Dr. Hernandez, Dr. Banks, & ONEAL Velasco  Name of Family Notified of Death: Sylvie (wife)   Relationship to Patient: Spouse  Family Notification Route: Telephone (en route)    Autopsy Options:  Decision for post-mortem examination not yet made by next of kin.    Primary Service Attending Physician notified?:  yes - Attending:  Foreign Banks, DO    Physician/Resident responsible for completing Discharge Summary:  Foreign Banks

## 2024-06-02 NOTE — ASSESSMENT & PLAN NOTE
Lab Results   Component Value Date    HGBA1C 7.7 (H) 01/07/2024       Recent Labs     06/02/24  0034 06/02/24  0258 06/02/24  0516 06/02/24  0731   POCGLU 576* 532* 461* 366*       Blood Sugar Average: Last 72 hrs:  (P) 413.5  Management by primary team

## 2024-06-02 NOTE — PLAN OF CARE
Problem: PAIN - ADULT  Goal: Verbalizes/displays adequate comfort level or baseline comfort level  Description: Interventions:  - Encourage patient to monitor pain and request assistance  - Assess pain using appropriate pain scale  - Administer analgesics based on type and severity of pain and evaluate response  - Implement non-pharmacological measures as appropriate and evaluate response  - Consider cultural and social influences on pain and pain management  - Notify physician/advanced practitioner if interventions unsuccessful or patient reports new pain  Outcome: Progressing     Problem: SAFETY ADULT  Goal: Patient will remain free of falls  Description: INTERVENTIONS:  - Educate patient/family on patient safety including physical limitations  - Instruct patient to call for assistance with activity   - Consult OT/PT to assist with strengthening/mobility   - Keep Call bell within reach  - Keep bed low and locked with side rails adjusted as appropriate  - Keep care items and personal belongings within reach  - Initiate and maintain comfort rounds  - Make Fall Risk Sign visible to staff  - Offer Toileting every 2 Hours, in advance of need  - Apply yellow socks and bracelet for high fall risk patients  - Consider moving patient to room near nurses station  Outcome: Progressing  Goal: Maintain or return to baseline ADL function  Description: INTERVENTIONS:  -  Assess patient's ability to carry out ADLs; assess patient's baseline for ADL function and identify physical deficits which impact ability to perform ADLs (bathing, care of mouth/teeth, toileting, grooming, dressing, etc.)  - Assess/evaluate cause of self-care deficits   - Assess range of motion  - Assess patient's mobility; develop plan if impaired  - Assess patient's need for assistive devices and provide as appropriate  - Encourage maximum independence but intervene and supervise when necessary  - Involve family in performance of ADLs  - Assess for home  care needs following discharge   - Consider OT consult to assist with ADL evaluation and planning for discharge  - Provide patient education as appropriate  Outcome: Progressing  Goal: Maintains/Returns to pre admission functional level  Description: INTERVENTIONS:  - Perform AM-PAC 6 Click Basic Mobility/ Daily Activity assessment daily.  - Set and communicate daily mobility goal to care team and patient/family/caregiver.   - Collaborate with rehabilitation services on mobility goals if consulted  - Perform Range of Motion 4 times a day.  - Reposition patient every 2 hours.  - Dangle patient 3 times a day  - Stand patient 3 times a day  - Ambulate patient 3 times a day  - Out of bed to chair 3 times a day   - Out of bed for meals 3 times a day  - Out of bed for toileting  - Record patient progress and toleration of activity level   Outcome: Progressing     Problem: DISCHARGE PLANNING  Goal: Discharge to home or other facility with appropriate resources  Description: INTERVENTIONS:  - Identify barriers to discharge w/patient and caregiver  - Arrange for needed discharge resources and transportation as appropriate  - Identify discharge learning needs (meds, wound care, etc.)  - Arrange for interpretive services to assist at discharge as needed  - Refer to Case Management Department for coordinating discharge planning if the patient needs post-hospital services based on physician/advanced practitioner order or complex needs related to functional status, cognitive ability, or social support system  Outcome: Progressing     Problem: RESPIRATORY - ADULT  Goal: Achieves optimal ventilation and oxygenation  Description: INTERVENTIONS:  - Assess for changes in respiratory status  - Assess for changes in mentation and behavior  - Position to facilitate oxygenation and minimize respiratory effort  - Oxygen administered by appropriate delivery if ordered  - Initiate smoking cessation education as indicated  - Encourage  broncho-pulmonary hygiene including cough, deep breathe, Incentive Spirometry  - Assess the need for suctioning and aspirate as needed  - Assess and instruct to report SOB or any respiratory difficulty  - Respiratory Therapy support as indicated  Outcome: Progressing     Problem: METABOLIC, FLUID AND ELECTROLYTES - ADULT  Goal: Electrolytes maintained within normal limits  Description: INTERVENTIONS:  - Monitor labs and assess patient for signs and symptoms of electrolyte imbalances  - Administer electrolyte replacement as ordered  - Monitor response to electrolyte replacements, including repeat lab results as appropriate  - Instruct patient on fluid and nutrition as appropriate  Outcome: Progressing  Goal: Fluid balance maintained  Description: INTERVENTIONS:  - Monitor labs   - Monitor I/O and WT  - Instruct patient on fluid and nutrition as appropriate  - Assess for signs & symptoms of volume excess or deficit  Outcome: Progressing  Goal: Glucose maintained within target range  Description: INTERVENTIONS:  - Monitor Blood Glucose as ordered  - Assess for signs and symptoms of hyperglycemia and hypoglycemia  - Administer ordered medications to maintain glucose within target range  - Assess nutritional intake and initiate nutrition service referral as needed  Outcome: Progressing     Problem: SKIN/TISSUE INTEGRITY - ADULT  Goal: Skin Integrity remains intact(Skin Breakdown Prevention)  Description: Assess:  -Perform Frank assessment every shift  -Clean and moisturize skin every shift  -Inspect skin when repositioning, toileting, and assisting with ADLS  -Assess under medical devices such as Masimo every shift  -Assess extremities for adequate circulation and sensation     Bed Management:  -Have minimal linens on bed & keep smooth, unwrinkled  -Change linens as needed when moist or perspiring  -Avoid sitting or lying in one position for more than 2 hours while in bed  -Keep HOB at 30 degrees     Toileting:  -Offer  bedside commode  -Assess for incontinence every shift  -Use incontinent care products after each incontinent episode such as shift    Activity:  -Mobilize patient 4 times a day  -Encourage activity and walks on unit  -Encourage or provide ROM exercises   -Turn and reposition patient every 2 Hours  -Use appropriate equipment to lift or move patient in bed  -Instruct/ Assist with weight shifting every 1 hour when out of bed in chair  -Consider limitation of chair time 1 hour intervals    Skin Care:  -Avoid use of baby powder, tape, friction and shearing, hot water or constrictive clothing  -Relieve pressure over bony prominences using pillows  -Do not massage red bony areas    Next Steps:  -Teach patient strategies to minimize risks such as skin breakdown   -Consider consults to  interdisciplinary teams such as wound care nurse   Outcome: Progressing  Goal: Incision(s), wounds(s) or drain site(s) healing without S/S of infection  Description: INTERVENTIONS  - Assess and document dressing, incision, wound bed, drain sites and surrounding tissue  - Provide patient and family education  - Perform skin care/dressing changes every shift   Outcome: Progressing     Problem: HEMATOLOGIC - ADULT  Goal: Maintains hematologic stability  Description: INTERVENTIONS  - Assess for signs and symptoms of bleeding or hemorrhage  - Monitor labs  - Administer supportive blood products/factors as ordered and appropriate  Outcome: Progressing

## 2024-06-02 NOTE — ASSESSMENT & PLAN NOTE
"ECHO 4/24 demonstrating EF 45% \"There is mild global hypokinesis. Diastolic function is mildly abnormal, consistent with grade I (abnormal) relaxation.\"  Improved compared to ECHO from 1/2024 which demonstrated EF 32% and severe global hypokinesis prior to cardiac cath with PCI performed during admission to [facility] 1/2024  Monitor daily weights, I/O  Volume management with HD  "

## 2024-06-02 NOTE — PLAN OF CARE
Problem: RESPIRATORY - ADULT  Goal: Achieves optimal ventilation and oxygenation  Description: INTERVENTIONS:  - Assess for changes in respiratory status  - Assess for changes in mentation and behavior  - Position to facilitate oxygenation and minimize respiratory effort  - Oxygen administered by appropriate delivery if ordered  - Initiate smoking cessation education as indicated  - Encourage broncho-pulmonary hygiene including cough, deep breathe, Incentive Spirometry  - Assess the need for suctioning and aspirate as needed  - Assess and instruct to report SOB or any respiratory difficulty  - Respiratory Therapy support as indicated  Outcome: Progressing     Problem: METABOLIC, FLUID AND ELECTROLYTES - ADULT  Goal: Electrolytes maintained within normal limits  Description: INTERVENTIONS:  - Monitor labs and assess patient for signs and symptoms of electrolyte imbalances  - Administer electrolyte replacement as ordered  - Monitor response to electrolyte replacements, including repeat lab results as appropriate  - Instruct patient on fluid and nutrition as appropriate  Outcome: Progressing  Goal: Fluid balance maintained  Description: INTERVENTIONS:  - Monitor labs   - Monitor I/O and WT  - Instruct patient on fluid and nutrition as appropriate  - Assess for signs & symptoms of volume excess or deficit  Outcome: Progressing  Goal: Glucose maintained within target range  Description: INTERVENTIONS:  - Monitor Blood Glucose as ordered  - Assess for signs and symptoms of hyperglycemia and hypoglycemia  - Administer ordered medications to maintain glucose within target range  - Assess nutritional intake and initiate nutrition service referral as needed  Outcome: Progressing

## 2024-06-02 NOTE — ASSESSMENT & PLAN NOTE
Patient with prior non-ST elevation MI in January 2024 underwent cardiac catheterization which demonstrated a ostial to proximal 95% LAD and a ostial to proximal 75% circumflex lesions with a 50% left main coronary artery lesion and a total RCA.  6 days later he had an Impella placed with subsequent stents placed in the LAD and circumflex vessels.      Yesterday due to hypotension during dialysis, diaphoresis, feeling lightheaded and pain in the right scapular area his dialysis was ended early.  Initially his right scapular pain was waxing and waning.  Last night the pain became constant and has continued all night.  Troponin trend: 145, 139, 125, 117, 295, 5420.  His EKG demonstrates an increase in QRS duration from January 8, 2024 of 108 QRS duration presently of 160.  His EKG at 1 AM this morning demonstrated ST elevation in leads V1 and V2 of 4 mm and ST depression in V5 and V6 of 6-8 mm.  These changes in ST segments are new when compared to old EKGs.  Although patient has a LBBB, these changes are strongly suggestive of a STEMI.  His QTc is prolonged but when corrected for the QRS duration his QT/QTc are 430/523.  Patient's blood pressure has ranged from 77-93 systolic.  His O2 saturation is in the 80s.    Discussed patient with Dr. Saini who agrees the patient should be transferred to [place] [facility] for further management.

## 2024-06-02 NOTE — ASSESSMENT & PLAN NOTE
Patient reports that normally he takes an additional 5mg of midodrine prior to his dialysis session, but today he did not. He reports that secondary to hypotension, his session was shortened.  At home, he became diaphoretic with brief pain in right scapular region then noted his SBP was in the 70's. Pt states that he took 7.5mg of midodrine and salt in an attempt to raise his blood pressure without improvement prompting him to come to the [facility] ED   Pt bp initially 76/42 on presentation to the ED. Bp labile while in the ED. Initially improved to SBP in the 90's after 12.5 mg albumin. SBP then dropped back to the 70's, improved following 10 mg midodrine.  On arrival to [facility] bp 77/50, following administration of albumin bp now 93/57  Continue to monitor blood pressures closely   Continue home regimen of 10mg midodrine tid with 5mg midodrine prior to dialysis

## 2024-06-02 NOTE — ED RE-EVALUATION NOTE
Pressures re-evaluated while in ED, when awoken systolic is >90. Patient is demanding repeat insulin dosage as his glucose is high and the last 10 units was cancelled out by the glucose given to him. We explained that this would be given at accepting facility and is not emergent, however, he is demanding he receives this now. Will give 10 units of insulin. Advised EMS to recheck glucose en route.     Ian De Jesus MD  06/02/24 [id number]

## 2024-06-02 NOTE — UTILIZATION REVIEW
Initial Clinical Review    Admission: Date/Time/Statement:   Admission Orders (From admission, onward)       Ordered        06/02/24 0506  INPATIENT ADMISSION  Once                          Orders Placed This Encounter   Procedures    INPATIENT ADMISSION     Standing Status:   Standing     Number of Occurrences:   1     Order Specific Question:   Level of Care     Answer:   Level 2 Stepdown / HOT [14]     Order Specific Question:   Estimated length of stay     Answer:   More than 2 Midnights     Order Specific Question:   Certification     Answer:   I certify that inpatient services are medically necessary for this patient for a duration of greater than two midnights. See H&P and MD Progress Notes for additional information about the patient's course of treatment.     ED Arrival Information       Patient not seen in ED                           Initial Presentation: 53 y.o. male pmh ESTD wprior kidney transplant, multiple cardiac issues with multivessel disease w Impella ion place, AFib, DM1  to ED by EMS presents presents with hypotension, hyperkalemia, prolonged QTC, and hyperglycemia. Pt reports that yesterday he did not take his midodrine prior to going to dialysis. He  became hypotensive during the session, unable to complete the full session.   He reports that at home he then became diaphoretic w acute brief right scapular pain,  slightly lightheaded. He took his blood pressure and noted his SBP to be in the 70's. He then took an additional 7.5mg to see if this would improve his pressure along with eating salt . Reports during dialysis right scapular pain was waxing and waning. Last night the pain became constant and has continued all night     EXAM  EKG w LBBB marked ST depression concerning for lateral ischemia; ABN ECG strongly suggestive of STEMI changed from prior, hypotension, hypoxia in 80s; Labs eelevated TROPS, hyperkalemia at 5.6 . BiPAP started, given calcium gluconate, albuterol, and insulin +  dextrose given12.5 mg albumin. SBP then dropped back to the 70's, improved following 10 mg midodrine. IV Heparin GTT  Inpatient SD2 ICU due to STEMI, Cardiogenic shock  Tele, IV Heparin GTT, recs per Cardiology, MAG, BMP repeat, hold amiodarone, Nephrology consult   Anticipated Length of Stay/Certification Statement: admitted on an inpatient basis with an anticipated length of stay of greater than 2 midnights secondary to hyperkalemia, prolonged qt, hypotension.     Cardiology   STEMI/ Cardiogenic shock   Consult w Cardiology @ \[facility]\""; transfer to [place] ICU for further management.   Acute distress, diaphoretic, distant heart sounds, rales, dry heaves; EKG: Sinus rhythm with left bundle branch block pattern and marked ST-T wave abnormalities.   IV Heparin GTT started & but quickly decompensated.     Nephrology  Patient unfortunately had a cardiac arrest and is unfortunately . I was not involved in the care of this patient and was on the way to see the patient.     Attending MD   coordinating transfer to [place] and had transferred him to the ICU due to decompensated.  ICU intubated became a CODE BLUE, cardiac arrest  Patient unfortunately did not respond to ACLS protocols  Time of death was 9:52 AM on 2024     ED Triage Vitals   Temperature Pulse Respirations Blood Pressure SpO2   24 0508 24 0610 24 0508 24 0508 24 0610   97.5 °F (36.4 °C) 84 19 (!) 77/50 100 %      Temp Source Heart Rate Source Patient Position - Orthostatic VS BP Location FiO2 (%)   24 0732 -- 24 0732 24 0732 --   Oral  Lying Left arm       Pain Score       24 0507       No Pain          Wt Readings from Last 1 Encounters:   24 44.7 kg (98 lb 8.7 oz)     Additional Vital Signs:   Date/Time Temp Pulse Resp BP MAP (mmHg) SpO2 Calculated FIO2 (%) - Nasal Cannula Nasal Cannula O2 Flow Rate (L/min) O2 Device O2 Interface Device Patient Position - Orthostatic VS   24  "09:45:58 -- 0 Abnormal  -- -- -- -- -- -- -- -- --   06/02/24 09:43:07 -- 0 Abnormal  -- -- -- -- -- -- -- -- --   06/02/24 09:40:23 -- 0 Abnormal  -- -- -- -- -- -- -- -- --   06/02/24 09:36:10 -- 0 Abnormal  -- -- -- -- -- -- -- -- --   06/02/24 09:33:27 -- 55 -- -- -- -- -- -- -- -- --   06/02/24 09:30:26 -- 0 Abnormal  -- -- -- -- -- -- -- -- --   06/02/24 09:28:08 -- 0 Abnormal  -- -- -- -- -- -- -- -- --   06/02/24 09:25:44 -- 0 Abnormal  -- -- -- -- -- -- -- -- --   06/02/24 09:23:29 -- 0 Abnormal  -- -- -- -- -- -- -- -- --   06/02/24 09:21:16 -- 0 Abnormal  -- -- -- -- -- -- -- -- --   06/02/24 0852 -- -- -- -- -- -- -- -- -- Full face mask --   06/02/24 08:46:56 -- -- -- 85/55 Abnormal  65 -- -- -- -- -- --   06/02/24 07:32:57 97.5 °F (36.4 °C) 96 20 89/56 Abnormal  67 86 % Abnormal  -- -- Nasal cannula -- Lying   06/02/24 06:10:59 -- 84 -- 93/57 69 100 % 32 3 L/min Nasal cannula -- --   06/02/24 05:08:50 97.5 °F (36.4 °C) -- 19 77/50 Abnormal  59 Abnormal  -- -- -- -- -- --   06/02/24 05:08:21 97.5 °F (36.4 °C) -- 19 77/50 Abnormal  59 Abnormal  -- -- -- -- -- --     Weights (last 14 days) before discharge    Date/Time Weight Weight Method Height   06/02/24 0600 44.7 kg (98 lb 8.7 oz) Bed scale  --   Weight Method: pt unable to stand at 06/02/24 0600   06/02/24 0525 44.7 kg (98 lb 8.7 oz) Bed scale 5' 4\" (1.626 m)     Pertinent Labs/Diagnostic Test Results:   6/2 EKG=  Normal sinus rhythm  Possible Left atrial enlargement  Left bundle branch block  marked st depression which may reflect lateral ischemia  Abnormal ECG  When compared with ECG of 02-JUN-2024 01:15, (unconfirmed)  IL interval has decreased  Questionable change in QRS axis  QT has lengthened    No orders to display         Results from last 7 days   Lab Units 06/01/24  2314   WBC Thousand/uL 10.51*   HEMOGLOBIN g/dL 13.4   HEMATOCRIT % 43.5   PLATELETS Thousands/uL 273   TOTAL NEUT ABS Thousands/µL 8.79*         Results from last 7 days   Lab " Units 06/02/24  0639 06/01/24  2314   SODIUM mmol/L 135 130*   POTASSIUM mmol/L 3.3* 5.6*   CHLORIDE mmol/L 80* 92*   CO2 mmol/L 15* 23   ANION GAP mmol/L 40* 15*   BUN mg/dL 44* 52*   CREATININE mg/dL 4.27* 5.62*   EGFR ml/min/1.73sq m 14 10   CALCIUM mg/dL 6.3* 8.5   MAGNESIUM mg/dL 1.9  --          Results from last 7 days   Lab Units 06/02/24  0731 06/02/24  0516 06/02/24  0258 06/02/24  0034   POC GLUCOSE mg/dl 366* 461* 532* 576*     Results from last 7 days   Lab Units 06/02/24  0639 06/01/24  2314   GLUCOSE RANDOM mg/dL 326* 398*             Beta- Hydroxybutyrate   Date Value Ref Range Status   06/02/2024 0.10 0.02 - 0.27 mmol/L Final     BETA-HYDROXYBUTYRATE   Date Value Ref Range Status   12/01/2019 0.3 <0.6 mmol/L Final   10/20/2019 0.0 <0.6 mmol/L Final   12/19/2018 1.59 (H) 0.02 - 0.27 mmol/L Final          Results from last 7 days   Lab Units 06/02/24  0639   PH JAMES  7.223*   PCO2 JAMES mm Hg 40.8*   PO2 JAMES mm Hg 50.4*   HCO3 JAMES mmol/L 16.4*   BASE EXC JAMES mmol/L -10.6   O2 CONTENT JAMES ml/dL 13.6   O2 HGB, VENOUS % 77.2             Results from last 7 days   Lab Units 06/02/24  0217 06/01/24  2314   HS TNI 0HR ng/L  --  117*   HS TNI 2HR ng/L 295*  --    HSTNI D2 ng/L 178*  --          Results from last 7 days   Lab Units 06/02/24  0834   PROTIME seconds 15.6*   INR  1.22*   PTT seconds 38*             Results from last 7 days   Lab Units 06/02/24  0834   LACTIC ACID mmol/L 3.1*         ED Treatment:   Medication Administration - No Administrations Displayed (No Start Event Found)       None          Past Medical History:   Diagnosis Date    Bacteremia 12/21/2018    CAD (coronary artery disease)     s/p MARC to LCx, pLAD 2/2018    Cardiac arrest (HCC)     Chronic kidney disease     Diabetes mellitus type 1 (HCC)     Dialysis patient (HCC)     Access in right chest    GI bleed     Hyperlipidemia     Hypertension     Infection at site of external fixator pin (HCC)     MI (myocardial infarction) (HCC)      Myocardial infarction (HCC)     Pneumonia     Last Assessed [id number]    Renal failure     Renal transplant, status post 07/21/2007     Present on Admission:   Elevated troponin   Hyperkalemia   ESRD (end stage renal disease) (HCC)   Atrial fibrillation (HCC)   Coronary artery disease involving native coronary artery   Chronic diastolic (congestive) heart failure (HCC)   Abnormal EKG   Renal transplant failure and rejection   Acute myocardial infarction (HCC)   Cardiogenic shock (HCC)      Admitting Diagnosis: Hyperkalemia  Age/Sex: 53 y.o. male  Admission Orders:  Tele  Mechanical vent  BiPAP  I/O  Transfer to higher level of care      Scheduled Medications:  Medications 06/01 06/02   albumin human (FLEXBUMIN) 25 % injection 12.5 g  Dose: 12.5 g  Freq: Once Route: IV  Last Dose: Stopped (06/02/24 0028)  Start: 06/01/24 2245 End: 06/02/24 0028 2334 0028        albumin human (FLEXBUMIN) 25 % injection 25 g  Dose: 25 g  Freq: Once Route: IV  Last Dose: Stopped (06/02/24 1000)  Start: 06/02/24 0515 End: 06/02/24 1000   Admin Instructions:        0611     1000        insulin glargine (LANTUS) subcutaneous injection 10 Units 0.1 mL  Dose: 10 Units  Freq: Every 12 hours scheduled Route: SC  Start: 06/02/24 0530 End: 06/02/24 1546   Admin Instructions:        0552     0953     1546     1546-D/C'd           0953     1546     1546-D/C'd       insulin lispro (HumALOG/ADMELOG) 100 units/mL subcutaneous injection 1-5 Units  Dose: 1-5 Units  Freq: 3 times daily before meals Route: SC  Start: 06/02/24 0700 End: 06/02/24 1546   Admin Instructions:        0845     0953     1130     1546     1546-D/C'd      insulin lispro (HumALOG/ADMELOG) 100 units/mL subcutaneous injection 5 Units  Dose: 5 Units  Freq: Once Route: SC  Start: 06/02/24 0530 End: 06/02/24 0550   Admin Instructions:        0550        midodrine (PROAMATINE) tablet 10 mg  Dose: 10 mg  Freq: 3 times daily before meals Route: PO  Start: 06/02/24 0700 End:  06/02/24 1546     0612 [C]     1130     1546-D/C'd      midodrine (PROAMATINE) tablet 10 mg  Dose: 10 mg  Freq: Once Route: PO  Start: 06/02/24 0130 End: 06/02/24 0209     0209          1015     1546-D/C'd      pantoprazole (PROTONIX) EC tablet 40 mg  Dose: 40 mg  Freq: Daily before breakfast Route: PO  Start: 06/02/24 0700 End: 06/02/24 1546   Admin Instructions:        0612     1546-D/C'd          Continuous IV Infusions:    Medications 06/02   alteplase 50 mg infusion 50 mL  Freq: Code/trauma/sedation continuous med  Indications of Use: PULMONARY EMBOLISM  Last Dose: Stopped (06/02/24 1000)  Start: 06/02/24 0944 End: 06/02/24 0944 0944     1000        EPINEPHrine 5,000 mcg (STANDARD CONCENTRATION) IV in sodium chloride 0.9% 250 mL  Freq: Code/trauma/sedation continuous med  Last Dose: Stopped (06/02/24 1000)  Start: 06/02/24 0925 End: 06/02/24 0925    0925     0947        PRN Meds:  Medications 06/02   acetaminophen (TYLENOL) tablet 650 mg  Dose: 650 mg  Freq: Every 6 hours PRN Route: PO  PRN Reasons: mild pain,headaches,fever  Indications of Use: FEVER,HEADACHE,MILD PAIN  Start: 06/02/24 0535 End: 06/02/24 1546    1546-D/C'd      alteplase 50 mg infusion 50 mL  Freq: Code/trauma/sedation continuous med  Indications of Use: PULMONARY EMBOLISM  Start: 06/02/24 0944 End: 06/02/24 0944    0944     1000        amiodarone 150 mg/3 mL injection  Freq: Code/trauma/sedation medication  Start: 06/02/24 0922 End: 06/02/24 0932    0922     0924     0932        atropine 1 mg/10 mL injection  Freq: Code/trauma/sedation medication  Start: 06/02/24 0937 End: 06/02/24 0937    0937        calcium chloride 1 g/10 mL injection  Freq: Code/trauma/sedation medication  Start: 06/02/24 0920 End: 06/02/24 0920    0920        calcium chloride 1 g/10 mL injection  Freq: Code/trauma/sedation medication  Start: 06/02/24 0939 End: 06/02/24 0947    0939     0947        EPINEPHrine (ADRENALIN) injection  Freq: Code/trauma/sedation  medication Route: IV  Start: 06/02/24 0919 End: 06/02/24 0940    0919     0940        EPINEPHrine (ADRENALIN) injection  Freq: Code/trauma/sedation medication Route: IV  Start: 06/02/24 0920 End: 06/02/24 0939    0918     0921     0925     0926     0928     0933     0939        EPINEPHrine 5,000 mcg (STANDARD CONCENTRATION) IV in sodium chloride 0.9% 250 mL  Freq: Code/trauma/sedation continuous med  Start: 06/02/24 0925 End: 06/02/24 0925 0925     0947     1000        heparin (porcine) injection 1,200 Units  Dose: 1,200 Units  Freq: Every 6 hours PRN Route: IV  PRN Comment: PTT 43 - 59 seconds  Start: 06/02/24 0754 End: 06/02/24 1546   Admin Instructions:       0953     1546     1546-D/C'd      heparin (porcine) injection 2,400 Units  Dose: 2,400 Units  Freq: Every 6 hours PRN Route: IV  PRN Comment: PTT less than or equal to 42 seconds  Start: 06/02/24 0754 End: 06/02/24 1546   Admin Instructions:       0953     1546     1546-D/C'd      NOREPINEPHRINE 4 MG  ML NSS (CMPD ORDER) infusion **ADS Override Pull**  Start: 06/02/24 1013 End: 06/02/24 1546   Admin Instructions:       1015     1546-D/C'd      sodium bicarbonate 50 mEq/50 mL injection  Freq: Code/trauma/sedation medication  Start: 06/02/24 0935 End: 06/02/24 0949    0935     0949        sodium bicarbonate 8.4 % injection  Freq: Code/trauma/sedation medication Route: IV  Start: 06/02/24 0920 End: 06/02/24 0923 0920 0923          IP CONSULT TO CARDIOLOGY  IP CONSULT TO NEPHROLOGY    Network Utilization Review Department  ATTENTION: Please call with any questions or concerns to [phone] and carefully listen to the prompts so that you are directed to the right person. All voicemails are confidential.   For Discharge needs, contact Care Management DC Support Team at [phone] opt. 2  Send all requests for admission clinical reviews, approved or denied determinations and any other requests to dedicated fax number below belonging to the  [place] where the patient is receiving treatment. List of dedicated fax numbers for the Facilities:  FACILITY NAME UR FAX NUMBER   ADMISSION DENIALS (Administrative/Medical Necessity) [phone]   DISCHARGE SUPPORT TEAM (NETWORK) [phone]   PARENT CHILD HEALTH (Maternity/NICU/Pediatrics) [phone]   [facility] [phone]   [facility] [phone]   [facility] [phone]   [facility] [phone]   [facility] [phone]   [facility] [phone]   [facility] [phone]   [facility] [phone]   [facility] [phone]   [facility] [phone]   [facility] [phone]   [facility] [place] [phone]

## 2024-06-02 NOTE — ASSESSMENT & PLAN NOTE
Lab Results   Component Value Date    EGFR 10 06/01/2024    EGFR 10 02/02/2024    EGFR 10 01/20/2024    CREATININE 5.62 (H) 06/01/2024    CREATININE 5.59 (H) 02/02/2024    CREATININE 5.85 (H) 01/20/2024   Maintained on Tues, Thursday, Saturday dialysis  Pt unable to receive full dialysis session today secondary to hypotension  Nephrology consult pending

## 2024-06-02 NOTE — ASSESSMENT & PLAN NOTE
Wt Readings from Last 3 Encounters:   06/01/24 45 kg (99 lb 3.3 oz)   04/26/24 48.5 kg (106 lb 14.8 oz)   02/23/24 48.5 kg (107 lb)     Patient appears euvolemic on exam although CXR demonstrating bilateral pulmonary edema likely secondary to patient unable to receive dialysis yesterday due to hypotension  Monitor daily weights, I/O  Volume management with HD

## 2024-06-02 NOTE — ASSESSMENT & PLAN NOTE
Trop elevated at 117->295, continue to trend  EKG with peaked t-waves and prolonged qtc  Pt denies chest pain, palpitations, nausea, vomiting, or pedal edema. He reports diaphoresis and brief pain in right scapular region during hypotensive episode  If troponin continues to elevate and/or patient develops chest pain would consider initiation of heparin gtt  Monitor on telemetry  Cardiology consult pending

## 2024-06-02 NOTE — ED PROVIDER NOTES
"History  Chief Complaint   Patient presents with    Hypotension     Pt had dialysis today pt states his b/p is low      53 y.o M w/ h/o CKD (on HD T, Th, Sat) presenting for hypotension. Earlier today while Pt was at his dialysis session his BP was found to be low. Pt admits to not taking his midodrine prior to his session. As a result, the dialysis center decided to take off less fluid than normal. Following the session he developed back pain, which is not unusual for him post-HD, diaphoresis, and fatigue. He went home, took his midodrine 7hrs ago and ate some salty foods in the hope of bringing up his BP. Despite this his BP remained persistently low. Pt denies HA, CP, SOB, N/V, abdominal pain, pallor, fever, cough, dysuria, hematuria.      History provided by:  Patient      Prior to Admission Medications   Prescriptions Last Dose Informant Patient Reported? Taking?   B Complex-C-Folic Acid (triphrocaps) 1 MG CAPS   No No   Sig: Take 1 capsule (1 mg total) by mouth daily   Glucagon, rDNA, (Glucagon Emergency) 1 MG KIT   No No   Sig: INJECT 1MG SUBCUTANEOUSLY ONCE AS NEEDED FOR LOW BLOOD SUGAR FOR UP TO 1 DOSE   Insulin Syringe-Needle U-100 31G X 15/64\" 0.5 ML MISC   No No   Sig: Use 3 (three) times a day   amiodarone 200 mg tablet   No No   Sig: Take 1 tablet (200 mg total) by mouth daily with breakfast   apixaban (ELIQUIS) 2.5 mg   No No   Sig: Take 1 tablet (2.5 mg total) by mouth 2 (two) times a day   aspirin 81 mg chewable tablet  Self Yes No   Sig: Chew 81 mg every morning    atorvastatin (LIPITOR) 80 mg tablet   No No   Sig: Take 1 tablet (80 mg total) by mouth daily   calcitriol (ROCALTROL) 0.5 MCG capsule   No No   Sig: Take 1 capsule (0.5 mcg total) by mouth 3 (three) times a week   cinacalcet (SENSIPAR) 30 mg tablet   No No   Sig: Take 1 tablet (30 mg total) by mouth daily   clopidogrel (PLAVIX) 75 mg tablet   No No   Sig: Take 1 tablet (75 mg total) by mouth daily   glucose blood (Contour Next Test) " test strip   No No   Sig: Use as instructed 6 times daily   insulin aspart (NovoLOG) 100 units/mL injection   No No   Si Units by Subcutaneous Insulin Pump route 2 (two) times a day with meals   insulin glargine (Semglee) 100 units/mL subcutaneous injection   No No   Sig: Inject 10 Units under the skin every 12 (twelve) hours   midodrine (PROAMATINE) 10 MG tablet   No No   Sig: Take 1 tablet (10 mg total) by mouth 3 (three) times a day before meals   midodrine (PROAMATINE) 5 mg tablet   No No   Sig: Take 1 tablet (5 mg total) by mouth 3 (three) times a week Before dialysis   Patient taking differently: Take 5 mg by mouth 3 (three) times a week Before dialysis Tues thurs Sat   multivitamin (THERAGRAN) TABS   Yes No   Sig: Take 1 tablet by mouth daily   pantoprazole (PROTONIX) 40 mg tablet   No No   Sig: Take 1 tablet (40 mg total) by mouth daily   predniSONE 5 mg tablet   No No   Sig: Take 1 tablet (5 mg total) by mouth daily   sevelamer carbonate (RENVELA) 800 mg tablet   No No   Sig: Take 1 tablet (800 mg total) by mouth 3 (three) times a day with meals   tacrolimus (PROGRAF) 1 mg capsule   No No   Si caps in the morning and 1 cap in the evening      Facility-Administered Medications: None       Past Medical History:   Diagnosis Date    Bacteremia 2018    CAD (coronary artery disease)     s/p MARC to LCx, pLAD 2018    Cardiac arrest (HCC)     Chronic kidney disease     Diabetes mellitus type 1 (HCC)     Dialysis patient (HCC)     Access in right chest    GI bleed     Hyperlipidemia     Hypertension     Infection at site of external fixator pin (HCC)     MI (myocardial infarction) (HCC)     Myocardial infarction (HCC)     Pneumonia     Last Assessed [id number]    Renal failure     Renal transplant, status post 2007       Past Surgical History:   Procedure Laterality Date    AMPUTATION Right 2019    aboce knee    ANKLE FRACTURE SURGERY      ANKLE HARDWARE REMOVAL Right 2017    Procedure:  REMOVAL HARDWARE ANKLE;  Surgeon: Alvin Leon MD;  Location: [facility];  Service: Orthopedics    ANKLE HARDWARE REMOVAL Right 8/17/2017    Procedure: TIBIA FAILED HARDWARE REMOVAL;  Surgeon: Alvin Leon MD;  [facility]: [facility];  Service: Orthopedics    CARDIAC CATHETERIZATION Left 1/12/2024    Procedure: Cardiac Left Heart Cath;  Surgeon: Spencer Bloom MD;  [facility];  Service: Cardiology    CARDIAC CATHETERIZATION N/A 1/18/2024    Procedure: Cardiac pci;  Surgeon: Spencer Bloom MD;  [facility];  Service: Cardiology    CARDIAC CATHETERIZATION N/A 1/18/2024    Procedure: Cardiac Impella Insertion;  Surgeon: Spencer Bloom MD;  [facility];  Service: Cardiology    CLOSED REDUCTION ANKLE Right 7/3/2017    Procedure: CLOSED REDUCTION DISTAL TIB-FIB AND CASTING VS;  Surgeon: Alvin Leon MD;  [facility]: [facility];  Service: Orthopedics    CORONARY ANGIOPLASTY WITH STENT PLACEMENT  02/2018    CAD s/p MARC to LCx, pLAD    ESOPHAGOGASTRODUODENOSCOPY N/A 12/20/2018    Procedure: ESOPHAGOGASTRODUODENOSCOPY (EGD) in ICU;  Surgeon: Galileo Wise IV, MD;  Location: [facility];  Service: Gastroenterology    EYE SURGERY      FRACTURE SURGERY      ORIF Rt Ankle    GLUTAMIC ACID DECARBOXYLASE (HISTORICAL)      IR AV FISTULAGRAM/GRAFTOGRAM  4/16/2020    IR AV FISTULAGRAM/GRAFTOGRAM  6/23/2023    IR AV FISTULAGRAM/GRAFTOGRAM  2/12/2024    IR PICC LINE  8/20/2018    IR TEMPORARY DIALYSIS CATHETER CHECK/CHANGE/REPOSITION  1/8/2024    IR TUNNELED DIALYSIS CATHETER CHECK/CHANGE/REPOSITION/ANGIOPLASTY  1/8/2020    IR TUNNELED DIALYSIS CATHETER PLACEMENT  10/21/2019    IR TUNNELED DIALYSIS CATHETER REMOVAL  5/29/2020    IR VENOUS LINE REMOVAL  10/5/2018    LEG AMPUTATION Right 02/01/2019    Above the knee    NEPHRECTOMY TRANSPLANTED ORGAN      SD ARTERIOVENOUS ANASTOMOSIS OPEN DIRECT Right 2/11/2020    Procedure: CREATION FISTULA ARTERIOVENOUS (AV) right upper extremity;   Surgeon: Carlos Medina MD;  Location:  MAIN OR;  Service: Vascular    NM OPEN TREATMENT FRACTURE DISTAL TIBIA FIBULA Right 7/3/2017    Procedure: OPEN REDUCTION W/ INTERNAL FIXATION (ORIF);  Surgeon: Alvin Leon MD;  [facility];  Service: Orthopedics    TOE AMPUTATION Right 10/27/2016    Procedure: AMPUTATION TOE;  Surgeon: Krishna Ruiz DPM;  Location: [facility];  Service:        Family History   Problem Relation Age of Onset    Diabetes Brother     Coronary artery disease Mother     No Known Problems Father      I have reviewed and agree with the history as documented.    E-Cigarette/Vaping    E-Cigarette Use Never User      E-Cigarette/Vaping Substances    Nicotine No     THC No     CBD No     Flavoring No     Other No     Unknown No      Social History     Tobacco Use    Smoking status: Never    Smokeless tobacco: Never   Vaping Use    Vaping status: Never Used   Substance Use Topics    Alcohol use: Not Currently     Alcohol/week: 0.0 standard drinks of alcohol    Drug use: Never        Review of Systems   Constitutional:  Positive for diaphoresis and fatigue. Negative for chills and fever.   Respiratory:  Negative for shortness of breath.    Cardiovascular:  Negative for chest pain and palpitations.   Gastrointestinal:  Negative for abdominal pain, nausea and vomiting.   Skin:  Negative for pallor.   Neurological:  Positive for weakness. Negative for syncope, light-headedness and headaches.       Physical Exam  ED Triage Vitals   Temperature Pulse Respirations Blood Pressure SpO2   06/01/24 2157 06/01/24 2157 06/01/24 2157 06/01/24 2157 06/01/24 2157   97.8 °F (36.6 °C) 91 18 (!) 76/42 99 %      Temp Source Heart Rate Source Patient Position - Orthostatic VS BP Location FiO2 (%)   06/01/24 2157 06/01/24 2157 06/01/24 2207 06/01/24 2157 --   Temporal Monitor Sitting Left arm       Pain Score       06/01/24 2157       No Pain             Orthostatic Vital Signs  Vitals:    06/01/24 2336 06/02/24  0000 06/02/24 0030 06/02/24 0100   BP: 91/55 (!) 84/52 (!) 80/50 (!) 74/52   Pulse: 87 90 91 95   Patient Position - Orthostatic VS:           Physical Exam  Constitutional:       Appearance: Normal appearance.   HENT:      Mouth/Throat:      Mouth: Mucous membranes are moist.      Pharynx: Oropharynx is clear.   Eyes:      Extraocular Movements: Extraocular movements intact.      Conjunctiva/sclera: Conjunctivae normal.   Cardiovascular:      Rate and Rhythm: Normal rate and regular rhythm.      Pulses: Normal pulses.      Heart sounds: Normal heart sounds.   Pulmonary:      Effort: Pulmonary effort is normal.      Breath sounds: Normal breath sounds.   Abdominal:      General: Abdomen is flat.      Palpations: Abdomen is soft.   Musculoskeletal:      Right Lower Extremity: Right leg is amputated above knee.   Skin:     General: Skin is warm and dry.      Capillary Refill: Capillary refill takes less than 2 seconds.   Neurological:      General: No focal deficit present.      Mental Status: He is alert and oriented to person, place, and time.         ED Medications  Medications   midodrine (PROAMATINE) tablet 10 mg (has no administration in time range)   albumin human (FLEXBUMIN) 25 % injection 12.5 g (0 g Intravenous Stopped 6/2/24 0028)   calcium gluconate 1 g in sodium chloride 0.9% 50 mL (premix) (1 g Intravenous New Bag 6/2/24 0019)   insulin regular (HumuLIN R,NovoLIN R) injection 10 Units (10 Units Intravenous Given 6/2/24 0013)   dextrose 50 % IV solution 25 mL (25 mL Intravenous Given 6/2/24 0016)   albuterol inhalation solution 15 mg (15 mg Nebulization Given 6/2/24 0025)       Diagnostic Studies  Results Reviewed       Procedure Component Value Units Date/Time    Fingerstick Glucose (POCT) [[id number]]  (Abnormal) Collected: 06/02/24 0034    Lab Status: Final result Specimen: Blood Updated: 06/02/24 0036     POC Glucose 576 mg/dl     HS Troponin I 4hr [[id number]]     Lab Status: No result Specimen: Blood      HS Troponin I 2hr [[id number]]     Lab Status: No result Specimen: Blood     HS Troponin 0hr (reflex protocol) [[id number]]  (Abnormal) Collected: 06/01/24 2314    Lab Status: Final result Specimen: Blood from Arm, Left Updated: 06/01/24 2346     hs TnI 0hr 117 ng/L     Basic metabolic panel [[id number]]  (Abnormal) Collected: 06/01/24 2314    Lab Status: Final result Specimen: Blood from Arm, Left Updated: 06/01/24 2339     Sodium 130 mmol/L      Potassium 5.6 mmol/L      Chloride 92 mmol/L      CO2 23 mmol/L      ANION GAP 15 mmol/L      BUN 52 mg/dL      Creatinine 5.62 mg/dL      Glucose 398 mg/dL      Calcium 8.5 mg/dL      eGFR 10 ml/min/1.73sq m     Narrative:      National Kidney Disease Foundation guidelines for Chronic Kidney Disease (CKD):     Stage 1 with normal or high GFR (GFR > 90 mL/min/1.73 square meters)    Stage 2 Mild CKD (GFR = 60-89 mL/min/1.73 square meters)    Stage 3A Moderate CKD (GFR = 45-59 mL/min/1.73 square meters)    Stage 3B Moderate CKD (GFR = 30-44 mL/min/1.73 square meters)    Stage 4 Severe CKD (GFR = 15-29 mL/min/1.73 square meters)    Stage 5 End Stage CKD (GFR <15 mL/min/1.73 square meters)  Note: GFR calculation is accurate only with a steady state creatinine    CBC and differential [[id number]]  (Abnormal) Collected: 06/01/24 2314    Lab Status: Final result Specimen: Blood from Arm, Left Updated: 06/01/24 2324     WBC 10.51 Thousand/uL      RBC 4.47 Million/uL      Hemoglobin 13.4 g/dL      Hematocrit 43.5 %      MCV 97 fL      MCH 30.0 pg      MCHC 30.8 g/dL      RDW 17.0 %      MPV 8.8 fL      Platelets 273 Thousands/uL      nRBC 0 /100 WBCs      Segmented % 83 %      Immature Grans % 0 %      Lymphocytes % 10 %      Monocytes % 5 %      Eosinophils Relative 1 %      Basophils Relative 1 %      Absolute Neutrophils 8.79 Thousands/µL      Absolute Immature Grans 0.02 Thousand/uL      Absolute Lymphocytes 1.04 Thousands/µL      Absolute Monocytes 0.47 Thousand/µL       Eosinophils Absolute 0.10 Thousand/µL      Basophils Absolute 0.09 Thousands/µL                    XR chest 1 view portable    (Results Pending)         Procedures  ECG 12 Lead Documentation Only    Date/Time: 6/2/2024 12:16 AM    Performed by: Tony Ambrose MD  Authorized by: Tony Ambrose MD    ECG reviewed by me, the ED Provider: yes    Patient location:  ED  Interpretation:     Interpretation: abnormal    Rate:     ECG rate:  86    ECG rate assessment: normal    Rhythm:     Rhythm: sinus rhythm    Ectopy:     Ectopy: none    QRS:     QRS axis:  Left    QRS intervals:  Wide  Conduction:     Conduction: abnormal      Abnormal conduction: complete LBBB    ST segments:     ST segments:  Normal  T waves:     T waves: peaked      Peaked:  V3 and V4  Other findings:     Other findings: prolonged qTc interval          ED Course  ED Course as of 06/02/24 0131   Sat Jun 01, 2024   2349 hs TnI 0hr(!): 117  Likely elevated 2/2 ESRD and HF   Sun Jun 02, 2024   0020 Potassium(!): 5.6  Peaked T waves with QRS widening present on EKG. Will treat hyperkalemia with calcium gluconate, albuterol, insulin.    0020 Given the electrolyte abnormalities with EKG changes, Pt would likely benefit from dialysis. Nephrology consulted who stated the earlier they'd be able to conduct HD at [facility] is on Monday.   0024 I will be transferring the Pt to [facility] as they have dialysis capabilities in the event his hyper-kalemia worsens and he develops life-threatening arrhythmias, he would be in a facility that could do an emergent dialysis session.                              SBIRT 22yo+      Flowsheet Row Most Recent Value   Initial Alcohol Screen: US AUDIT-C     1. How often do you have a drink containing alcohol? 0 Filed at: 06/01/2024 2201   2. How many drinks containing alcohol do you have on a typical day you are drinking?  0 Filed at: 06/01/2024 2201   3a. Male UNDER 65: How often do you have five or more drinks on one occasion? 0 Filed at:  06/01/2024 2201   3b. FEMALE Any Age, or MALE 65+: How often do you have 4 or more drinks on one occassion? 0 Filed at: 06/01/2024 2201   Audit-C Score 0 Filed at: 06/01/2024 2201   ADRIEL: How many times in the past year have you...    Used an illegal drug or used a prescription medication for non-medical reasons? Never Filed at: 06/01/2024 2201                  Medical Decision Making  See ED course for additional MDM.    Amount and/or Complexity of Data Reviewed  Labs: ordered. Decision-making details documented in ED Course.  Radiology: ordered.    Risk  OTC drugs.  Prescription drug management.          Disposition  Final diagnoses:   Hyperkalemia   Hypotension   Hyponatremia   EKG abnormalities     Time reflects when diagnosis was documented in both MDM as applicable and the Disposition within this note       Time User Action Codes Description Comment    6/1/2024 11:53 PM Tony Ambrose [E87.5] Hyperkalemia     6/1/2024 11:53 PM Tony Ambrose [I95.9] Hypotension     6/2/2024 12:19 AM Tony Ambrose [E87.1] Hyponatremia     6/2/2024 12:27 AM Tony Ambrose [R94.31] EKG abnormalities           ED Disposition       ED Disposition   Transfer to Another Facility-In Network    Condition   --    Date/Time   Sun Jun 2, 2024 0107    Comment   Berto RANGEL Giana should be transferred out to [facility].               MD Documentation      Flowsheet Row Most Recent Value   Patient Condition The patient has been stabilized such that within reasonable medical probability, no material deterioration of the patient condition or the condition of the unborn child(tiesha) is likely to result from the transfer   Reason for Transfer Level of Care needed not available at this facility   Benefits of Transfer Specialized equipment and/or services available at the receiving facility (Include comment)________________________  [Dialysis]   Risks of Transfer Potential deterioration of medical condition, Loss of IV, Possible worsening  of condition or death during transfer, Potential for delay in receiving treatment, Increased discomfort during transfer   Accepting Physician Dr. Banks   Accepting Facility Name[facility]   Sending MD Dr. Ambrose   Provider Certification General risk, such as traffic hazards, adverse weather conditions, rough terrain or turbulence, possible failure of equipment (including vehicle or aircraft), or consequences of actions of persons outside the control of the transport personnel, Unanticipated needs of medical equipment and personnel during transport, Risk of worsening condition, The possibility of a transport vehicle being unavailable          RN Documentation      Flowsheet Row Most Recent Value   Accepting Facility [facility]          Follow-up Information    None         Patient's Medications   Discharge Prescriptions    No medications on file     No discharge procedures on file.    PDMP Review       None             ED Provider  Attending physically available and evaluated Berto Faria. I managed the patient along with the ED Attending.    Electronically Signed by           Tony Ambrose MD  06/02/24 0138

## 2024-06-02 NOTE — ASSESSMENT & PLAN NOTE
Patient was a transfer from [facility] with hyperkalemia and and end-stage renal disease patient.      On arrival to [place] he developed an acute myocardial infarction.  Cardiology was concerned that this was a ST elevation MI, they contacted interventional cardiology.  They felt he was too critically ill to be treated at [place]  He was started on a heparin drip but quickly decompensated.  We were coordinating transfer to [place] and had transferred him to the ICU as he was decompensated.  They intubated the patient.  But then he was a CODE BLUE, cardiac arrest      Patient unfortunately did not respond to ACLS protocols  Time of death was 9:52 AM on June 2, 2024    His wife is en route to the hospital and was made aware of his death

## 2024-06-03 LAB
ATRIAL RATE: 86 BPM
P AXIS: 70 DEGREES
PR INTERVAL: 158 MS
QRS AXIS: -45 DEGREES
QRSD INTERVAL: 206 MS
QT INTERVAL: 506 MS
QTC INTERVAL: 605 MS
T WAVE AXIS: 110 DEGREES
VENTRICULAR RATE: 86 BPM

## 2024-06-03 NOTE — UTILIZATION REVIEW
NOTIFICATION OF INPATIENT ADMISSION   AUTHORIZATION REQUEST   SERVICING FACILITY:   [place] [address]  Tax ID: [id number]  NPI: [id number] ATTENDING PROVIDER:  Attending Name and NPI#: [employer] [[id number]]  Address: [address] [place] [address]  Phone: [phone]     ADMISSION INFORMATION:  Place of Service: Inpatient [facility]  Place of Service Code: 21  Inpatient Admission Date/Time: 6/2/24  4:55 AM  Discharge Date/Time: 6/2/2024  1:30 PM  Admitting Diagnosis Code/Description:  Hyperkalemia     UTILIZATION REVIEW CONTACT:  Patience Dwyer Utilization   Network Utilization Review Department  Phone: [phone]  Fax [phone]  Email: [email or web address]  Contact for approvals/pending authorizations, clinical reviews, and discharge.     PHYSICIAN ADVISORY SERVICES:  Medical Necessity Denial & [employer] Review  Phone: [phone]  Fax: [phone]  Email: [email or web address]     DISCHARGE SUPPORT TEAM:  For Patients Discharge Needs & Updates  Phone: [phone] opt. 2 Fax: [phone]  Email: [email or web address]

## 2024-06-05 LAB
ATRIAL RATE: 95 BPM
P AXIS: 62 DEGREES
PR INTERVAL: 222 MS
QRS AXIS: 75 DEGREES
QRSD INTERVAL: 164 MS
QT INTERVAL: 406 MS
QTC INTERVAL: 510 MS
T WAVE AXIS: 148 DEGREES
VENTRICULAR RATE: 95 BPM

## 2024-06-07 LAB
ATRIAL RATE: 89 BPM
P AXIS: -8 DEGREES
PR INTERVAL: 148 MS
QRS AXIS: -59 DEGREES
QRSD INTERVAL: 178 MS
QT INTERVAL: 518 MS
QTC INTERVAL: 630 MS
T WAVE AXIS: 125 DEGREES
VENTRICULAR RATE: 89 BPM

## 2025-01-28 NOTE — ASSESSMENT & PLAN NOTE
Lab Results   Component Value Date    HGBA1C 7 0 (H) 09/24/2018       Recent Labs      12/19/18   1156   POCGLU  237*       Blood Sugar Average: Last 72 hrs:  (P) 237     Check beta hydroxybutyrate  Will continue with his home insulin regimen for now
· Concerning for worsening renal function in a transplanted patient  · Plan to transfer to tertiary care center for definitive treatment evaluation  · Ultrasound appeared relatively unremarkable with no obstruction
· In light of renal failure and sepsis, will hold tacrolimus and Imuran, continue prednisone
· NSTEMI type 2 due to supply demand mismatch from above-mentioned medical problems
· Severe sepsis likely due to infection of right ankle given history of recurrent infections and osteomyelitis  · Patient had recent surgery on the ankle and was treated with prolonged course of doxycycline for corynebacterium striatum  · Recently, patient had a trauma to the ankle and had been complaining of pain, with protruding screws  · Case discussed with critical care  · They are recommending transfer to tertiary care facility given his complicated history of renal transplant, recurrent infections and osteomyelitis  · Case discussed with [facility], transfers pending  · In the interim, we will treat with vancomycin/cefepime/doxycycline pending culture results  · Continue IV fluid resuscitation
· We will treat with antibiotics as noted above
Yes

## (undated) DEVICE — NC TREK NEO™ CORONARY DILATATION CATHETER 3.00 MM X 12 MM / RAPID-EXCHANGE: Brand: NC TREK NEO™

## (undated) DEVICE — Device

## (undated) DEVICE — HALF SHEET: Brand: CONVERTORS

## (undated) DEVICE — CUFF TOURNIQUET 18 X 4 IN QUICK CONNECT DISP 1 BLADDER

## (undated) DEVICE — CATH SHOCKWAVE C2 3 X 12MM

## (undated) DEVICE — CURITY STRETCH BANDAGE: Brand: CURITY

## (undated) DEVICE — CATH IMPELLA CP

## (undated) DEVICE — CAUTERY TIP POLISHER: Brand: DEVON

## (undated) DEVICE — MICROPUNCTURE INTRODUCER SET SILHOUETTE TRANSITIONLESS PUSH-PLUS DESIGN - STIFFENED CANNULA WITH NITINOL WIRE GUIDE: Brand: MICROPUNCTURE

## (undated) DEVICE — TOWEL SET X-RAY

## (undated) DEVICE — ACE WRAP 4 IN STERILE

## (undated) DEVICE — DESTINATION PERIPHERAL GUIDING SHEATH: Brand: DESTINATION

## (undated) DEVICE — TUBING SUCTION 5MM X 12 FT

## (undated) DEVICE — CHLORAPREP HI-LITE 26ML ORANGE

## (undated) DEVICE — MAYO STAND COVER: Brand: CONVERTORS

## (undated) DEVICE — INTENDED FOR TISSUE SEPARATION, AND OTHER PROCEDURES THAT REQUIRE A SHARP SURGICAL BLADE TO PUNCTURE OR CUT.: Brand: BARD-PARKER ® CARBON RIB-BACK BLADES

## (undated) DEVICE — DRAPE C-ARM X-RAY

## (undated) DEVICE — 10FR FRAZIER SUCTION HANDLE: Brand: CARDINAL HEALTH

## (undated) DEVICE — GLOVE INDICATOR PI UNDERGLOVE SZ 8 BLUE

## (undated) DEVICE — GAUZE SPONGES,USP TYPE VII GAUZE, 12 PLY: Brand: CURITY

## (undated) DEVICE — SUT PROLENE 6-0 BV130 30 IN 8709H

## (undated) DEVICE — SUT ETHILON 3-0 INFS-1 30 IN 669H

## (undated) DEVICE — PINNACLE INTRODUCER SHEATH: Brand: PINNACLE

## (undated) DEVICE — CLOSURE DEVICE FEMORAL BLOOD STOP ARCH GOLD

## (undated) DEVICE — BANDAGE, ESMARK LF STR 6"X9' (20/CS): Brand: CYPRESS

## (undated) DEVICE — COBAN 6 IN STERILE

## (undated) DEVICE — NEEDLE 25G X 1 1/2

## (undated) DEVICE — ACE WRAP 6 IN STERILE

## (undated) DEVICE — 3M™ TEGADERM™ TRANSPARENT FILM DRESSING FRAME STYLE, 1626W, 4 IN X 4-3/4 IN (10 CM X 12 CM), 50/CT 4CT/CASE: Brand: 3M™ TEGADERM™

## (undated) DEVICE — SURGICAL GOWN, XL SMARTSLEEVE: Brand: CONVERTORS

## (undated) DEVICE — PADDING CAST 4 IN  COTTON STRL

## (undated) DEVICE — INTENDED FOR TISSUE SEPARATION, AND OTHER PROCEDURES THAT REQUIRE A SHARP SURGICAL BLADE TO PUNCTURE OR CUT.: Brand: BARD-PARKER SAFETY BLADES SIZE 15, STERILE

## (undated) DEVICE — IV CATH INTROCAN 18G X 1 1/4 SAFETY

## (undated) DEVICE — CATH GUIDE RUNWAY 6FR FL4.0

## (undated) DEVICE — IODOFORM PACKING STRIP: Brand: CURITY

## (undated) DEVICE — NEEDLE 21 G X 1

## (undated) DEVICE — DRAPE C-ARMOUR

## (undated) DEVICE — DGW .035 FC J3MM 150CM TEF: Brand: EMERALD

## (undated) DEVICE — LIGACLIP MCA MULTIPLE CLIP APPLIERS, 20 MEDIUM CLIPS: Brand: LIGACLIP

## (undated) DEVICE — 2.8MM THREADED GUIDE WIRE 450MM/TROCAR POINT/300MM CALIB

## (undated) DEVICE — CATH DIAG 5FR IMPULSE 110CM 6S PIG 145 ANG

## (undated) DEVICE — STRL BETHLEHEM A V FISTULA PK: Brand: CARDINAL HEALTH

## (undated) DEVICE — SPECIMEN CONTAINER STERILE PEEL PACK

## (undated) DEVICE — TAPE CAST 3IN FIBERGLASS 4YD WHITE

## (undated) DEVICE — TAPE CAST 4IN FIBERGLASS 4YD WHITE

## (undated) DEVICE — CONMED ACCESSORY ELECTRODE, NEEDLE ELECTRODE: Brand: CONMED

## (undated) DEVICE — CATH BAL PTCA EMERGE MR 2.5 X 15MM

## (undated) DEVICE — 3000CC GUARDIAN II: Brand: GUARDIAN

## (undated) DEVICE — ABDOMINAL PAD: Brand: DERMACEA

## (undated) DEVICE — SUT GORTEX CV-6 6M04A

## (undated) DEVICE — 3M™ IOBAN™ 2 ANTIMICROBIAL INCISE DRAPE 6648EZ: Brand: IOBAN™ 2

## (undated) DEVICE — UNIVERSAL  MINOR EXTREMITY PK: Brand: CARDINAL HEALTH

## (undated) DEVICE — SYRINGE 10ML LL

## (undated) DEVICE — SPLINT 4 X 30 IN PRECUT SYNTHETIC

## (undated) DEVICE — SUT VICRYL 2-0 CT-2 27 IN J269H

## (undated) DEVICE — SUT VICRYL 2-0 CT-1 36 IN J945H

## (undated) DEVICE — GAUZE SPONGES,16 PLY: Brand: CURITY

## (undated) DEVICE — GLOVE SRG BIOGEL 7.5

## (undated) DEVICE — BAG DECANTER

## (undated) DEVICE — LIGHT GLOVE GREEN

## (undated) DEVICE — SPONGE LAP 18 X 18 IN

## (undated) DEVICE — SUT SILK 4-0 18 IN A183H

## (undated) DEVICE — VESSEL LOOP MINI YELLOW

## (undated) DEVICE — GLOVE SRG BIOGEL 7

## (undated) DEVICE — PENCIL ROCKER SWITCH CAUTERY HAND CONTROL

## (undated) DEVICE — PADS GROUND

## (undated) DEVICE — COBAN 4 IN STERILE

## (undated) DEVICE — SUT MONOCRYL 3-0 SH 27 IN Y416H

## (undated) DEVICE — RADIFOCUS GLIDEWIRE: Brand: GLIDEWIRE

## (undated) DEVICE — THREE-QUARTER SHEET: Brand: CONVERTORS

## (undated) DEVICE — SCANLAN® VASCU-STATT® II SINGLE-USE BULLDOG CLAMP W/FIRMER CLAMPING PRESS - MIDI ANGLED 45° (YELLOW), CLAMPING PRESSURE 75-80 G (2/STERILE PKG): Brand: SCANLAN® VASCU-STATT® II SINGLE-USE BULLDOG CLAMP W/FIRMER CLAMPING PRESS

## (undated) DEVICE — GLOVE SRG BIOGEL 8

## (undated) DEVICE — PROXIMATE RELOADABLE LINEAR STAPLER, 30MM: Brand: PROXIMATE

## (undated) DEVICE — HI-TORQUE PILOT 50 GUIDE WIRE .014 STRAIGHT TIP 3.0 CM X 190 CM: Brand: HI-TORQUE PILOT

## (undated) DEVICE — BALLOON EUPHORA RX 3 X 15MM

## (undated) DEVICE — OCCLUSIVE GAUZE STRIP,3% BISMUTH TRIBROMOPHENATE IN PETROLATUM BLEND: Brand: XEROFORM

## (undated) DEVICE — SUT ETHILON 2-0 FS 18 IN 664H

## (undated) DEVICE — RUNTHROUGH NS EXTRA FLOPPY PTCA GUIDEWIRE: Brand: RUNTHROUGH

## (undated) DEVICE — ASTOUND STANDARD SURGICAL GOWN, XL: Brand: CONVERTORS

## (undated) DEVICE — INTENDED FOR TISSUE SEPARATION, AND OTHER PROCEDURES THAT REQUIRE A SHARP SURGICAL BLADE TO PUNCTURE OR CUT.: Brand: BARD-PARKER SAFETY BLADES SIZE 11, STERILE

## (undated) DEVICE — CAST PADDING 4 IN SYNTHETIC NON-STRL

## (undated) DEVICE — SYRINGE 20ML LL

## (undated) DEVICE — CATH GUIDE LAUNCHER 6FR EBU 3.75

## (undated) DEVICE — 3M™ STERI-DRAPE™ U-DRAPE 1015: Brand: STERI-DRAPE™

## (undated) DEVICE — IMPERVIOUS STOCKINETTE: Brand: DEROYAL

## (undated) DEVICE — SUT SILK 2-0 30 IN A305H

## (undated) DEVICE — GLOVE SRG BIOGEL 6.5

## (undated) DEVICE — CAST PADDING 3 IN SYNTHETIC NON-STRL

## (undated) DEVICE — 5.0MM CANNULATED DRILL BIT LARGE QC/300MM

## (undated) DEVICE — SUT SILK 3-0 24 IN SA74H

## (undated) DEVICE — NEEDLE 18 G X 1 1/2 SAFETY

## (undated) DEVICE — CORONARY IMAGING CATHETER: Brand: OPTICROSS™ 6 HD

## (undated) DEVICE — CATH GUIDE LAUNCHER 6FR EBU 3.5

## (undated) DEVICE — PADDING CAST 6IN COTTON STRL

## (undated) DEVICE — CATH DIAG 5FR IMPULSE 100CM FR4

## (undated) DEVICE — CATH DIAG 5FR IMPULSE 100CM FL4

## (undated) DEVICE — TRANSPOSAL ULTRAFLEX DUO/QUAD ULTRA CART MANIFOLD

## (undated) DEVICE — SURGICEL FIBRILLAR 1 X 2